# Patient Record
Sex: FEMALE | Race: OTHER | HISPANIC OR LATINO | URBAN - METROPOLITAN AREA
[De-identification: names, ages, dates, MRNs, and addresses within clinical notes are randomized per-mention and may not be internally consistent; named-entity substitution may affect disease eponyms.]

---

## 2017-06-23 ENCOUNTER — INPATIENT (INPATIENT)
Facility: HOSPITAL | Age: 67
LOS: 5 days | Discharge: HOME | DRG: 690 | End: 2017-06-29
Attending: FAMILY MEDICINE | Admitting: FAMILY MEDICINE
Payer: MEDICARE

## 2017-06-23 VITALS
DIASTOLIC BLOOD PRESSURE: 79 MMHG | TEMPERATURE: 99 F | RESPIRATION RATE: 22 BRPM | HEART RATE: 87 BPM | OXYGEN SATURATION: 99 % | SYSTOLIC BLOOD PRESSURE: 157 MMHG | WEIGHT: 154.1 LBS | HEIGHT: 58 IN

## 2017-06-23 DIAGNOSIS — E11.9 TYPE 2 DIABETES MELLITUS WITHOUT COMPLICATIONS: ICD-10-CM

## 2017-06-23 DIAGNOSIS — E87.6 HYPOKALEMIA: ICD-10-CM

## 2017-06-23 DIAGNOSIS — N13.30 UNSPECIFIED HYDRONEPHROSIS: ICD-10-CM

## 2017-06-23 DIAGNOSIS — N12 TUBULO-INTERSTITIAL NEPHRITIS, NOT SPECIFIED AS ACUTE OR CHRONIC: ICD-10-CM

## 2017-06-23 LAB
ALBUMIN SERPL ELPH-MCNC: 4.2 G/DL — SIGNIFICANT CHANGE UP (ref 3.3–5.2)
ALP SERPL-CCNC: 78 U/L — SIGNIFICANT CHANGE UP (ref 40–120)
ALT FLD-CCNC: 25 U/L — SIGNIFICANT CHANGE UP
ANION GAP SERPL CALC-SCNC: 17 MMOL/L — SIGNIFICANT CHANGE UP (ref 5–17)
APPEARANCE UR: CLEAR — SIGNIFICANT CHANGE UP
AST SERPL-CCNC: 23 U/L — SIGNIFICANT CHANGE UP
BILIRUB SERPL-MCNC: 0.4 MG/DL — SIGNIFICANT CHANGE UP (ref 0.4–2)
BILIRUB UR-MCNC: NEGATIVE — SIGNIFICANT CHANGE UP
BUN SERPL-MCNC: 30 MG/DL — HIGH (ref 8–20)
CALCIUM SERPL-MCNC: 9.8 MG/DL — SIGNIFICANT CHANGE UP (ref 8.6–10.2)
CHLORIDE SERPL-SCNC: 91 MMOL/L — LOW (ref 98–107)
CO2 SERPL-SCNC: 28 MMOL/L — SIGNIFICANT CHANGE UP (ref 22–29)
COLOR SPEC: YELLOW — SIGNIFICANT CHANGE UP
CREAT SERPL-MCNC: 1.12 MG/DL — SIGNIFICANT CHANGE UP (ref 0.5–1.3)
DIFF PNL FLD: NEGATIVE — SIGNIFICANT CHANGE UP
EPI CELLS # UR: SIGNIFICANT CHANGE UP
GLUCOSE SERPL-MCNC: 206 MG/DL — HIGH (ref 70–115)
GLUCOSE UR QL: 100 MG/DL
HCT VFR BLD CALC: 37 % — SIGNIFICANT CHANGE UP (ref 37–47)
HGB BLD-MCNC: 12.7 G/DL — SIGNIFICANT CHANGE UP (ref 12–16)
KETONES UR-MCNC: NEGATIVE — SIGNIFICANT CHANGE UP
LEUKOCYTE ESTERASE UR-ACNC: ABNORMAL
MCHC RBC-ENTMCNC: 28.9 PG — SIGNIFICANT CHANGE UP (ref 27–31)
MCHC RBC-ENTMCNC: 34.3 G/DL — SIGNIFICANT CHANGE UP (ref 32–36)
MCV RBC AUTO: 84.1 FL — SIGNIFICANT CHANGE UP (ref 81–99)
NITRITE UR-MCNC: NEGATIVE — SIGNIFICANT CHANGE UP
PH UR: 8 — SIGNIFICANT CHANGE UP (ref 5–8)
PLATELET # BLD AUTO: 344 K/UL — SIGNIFICANT CHANGE UP (ref 150–400)
POTASSIUM SERPL-MCNC: 2.9 MMOL/L — CRITICAL LOW (ref 3.5–5.3)
POTASSIUM SERPL-SCNC: 2.9 MMOL/L — CRITICAL LOW (ref 3.5–5.3)
PROT SERPL-MCNC: 7.2 G/DL — SIGNIFICANT CHANGE UP (ref 6.6–8.7)
PROT UR-MCNC: 15 MG/DL
RBC # BLD: 4.4 M/UL — SIGNIFICANT CHANGE UP (ref 4.4–5.2)
RBC # FLD: 13.8 % — SIGNIFICANT CHANGE UP (ref 11–15.6)
RBC CASTS # UR COMP ASSIST: NEGATIVE /HPF — SIGNIFICANT CHANGE UP (ref 0–4)
SODIUM SERPL-SCNC: 136 MMOL/L — SIGNIFICANT CHANGE UP (ref 135–145)
SP GR SPEC: 1.01 — SIGNIFICANT CHANGE UP (ref 1.01–1.02)
UROBILINOGEN FLD QL: NEGATIVE MG/DL — SIGNIFICANT CHANGE UP
WBC # BLD: 14.7 K/UL — HIGH (ref 4.8–10.8)
WBC # FLD AUTO: 14.7 K/UL — HIGH (ref 4.8–10.8)
WBC UR QL: SIGNIFICANT CHANGE UP

## 2017-06-23 PROCEDURE — 74176 CT ABD & PELVIS W/O CONTRAST: CPT | Mod: 26

## 2017-06-23 PROCEDURE — 99285 EMERGENCY DEPT VISIT HI MDM: CPT

## 2017-06-23 RX ORDER — ONDANSETRON 8 MG/1
4 TABLET, FILM COATED ORAL ONCE
Qty: 0 | Refills: 0 | Status: COMPLETED | OUTPATIENT
Start: 2017-06-23 | End: 2017-06-23

## 2017-06-23 RX ORDER — MORPHINE SULFATE 50 MG/1
4 CAPSULE, EXTENDED RELEASE ORAL ONCE
Qty: 0 | Refills: 0 | Status: DISCONTINUED | OUTPATIENT
Start: 2017-06-23 | End: 2017-06-23

## 2017-06-23 RX ORDER — MAGNESIUM SULFATE 500 MG/ML
2 VIAL (ML) INJECTION ONCE
Qty: 0 | Refills: 0 | Status: COMPLETED | OUTPATIENT
Start: 2017-06-23 | End: 2017-06-23

## 2017-06-23 RX ORDER — KETOROLAC TROMETHAMINE 30 MG/ML
30 SYRINGE (ML) INJECTION ONCE
Qty: 0 | Refills: 0 | Status: DISCONTINUED | OUTPATIENT
Start: 2017-06-23 | End: 2017-06-23

## 2017-06-23 RX ORDER — POTASSIUM CHLORIDE 20 MEQ
40 PACKET (EA) ORAL ONCE
Qty: 0 | Refills: 0 | Status: COMPLETED | OUTPATIENT
Start: 2017-06-23 | End: 2017-06-23

## 2017-06-23 RX ORDER — SODIUM CHLORIDE 9 MG/ML
1000 INJECTION INTRAMUSCULAR; INTRAVENOUS; SUBCUTANEOUS ONCE
Qty: 0 | Refills: 0 | Status: COMPLETED | OUTPATIENT
Start: 2017-06-23 | End: 2017-06-23

## 2017-06-23 RX ADMIN — ONDANSETRON 4 MILLIGRAM(S): 8 TABLET, FILM COATED ORAL at 22:22

## 2017-06-23 RX ADMIN — Medication 30 MILLIGRAM(S): at 23:05

## 2017-06-23 RX ADMIN — SODIUM CHLORIDE 1000 MILLILITER(S): 9 INJECTION INTRAMUSCULAR; INTRAVENOUS; SUBCUTANEOUS at 22:20

## 2017-06-23 RX ADMIN — MORPHINE SULFATE 4 MILLIGRAM(S): 50 CAPSULE, EXTENDED RELEASE ORAL at 23:05

## 2017-06-23 RX ADMIN — Medication 30 MILLIGRAM(S): at 22:22

## 2017-06-23 RX ADMIN — Medication 40 MILLIEQUIVALENT(S): at 23:50

## 2017-06-23 RX ADMIN — MORPHINE SULFATE 4 MILLIGRAM(S): 50 CAPSULE, EXTENDED RELEASE ORAL at 22:22

## 2017-06-23 RX ADMIN — Medication 50 GRAM(S): at 23:50

## 2017-06-23 NOTE — ED PROVIDER NOTE - PROGRESS NOTE DETAILS
pt had 102 fever with persistent vomiting and pt not able to tolerate po. will admit to telemetry given hypokalemia. pK and mag repleted and pt treated for pyelonephritis. pt with persistent vomiting and pt appears .. will admit for hypokalemia with prolonged QTC and vomiting

## 2017-06-23 NOTE — ED ADULT NURSE REASSESSMENT NOTE - NS ED NURSE REASSESS COMMENT FT1
Assumed pt care @ 6154. Pt is A&Ox3 in NAD. Pt complains of RLQ pain at this time, will make Dr. Zayas aware.  IV clean dry and intact, running NS Bolus without difficulty.  Safety maintained, Bed locked in lowest position. Pt awaiting pending labs. Will continue to monitor.

## 2017-06-23 NOTE — ED PROVIDER NOTE - OBJECTIVE STATEMENT
65 y/o F with a PMHx of HTN presents to ED c/o back pain that radiates to the RUQ, pain onset today at 1500. Pt states she also has nausea  Pt denies hematuria, dysuria, vomiting, cardiac problems or any further complaints.

## 2017-06-23 NOTE — ED PROVIDER NOTE - CARE PLAN
Principal Discharge DX:	Intractable vomiting, presence of nausea not specified, unspecified vomiting type  Secondary Diagnosis:	Hypokalemia  Secondary Diagnosis:	Other hydronephrosis

## 2017-06-23 NOTE — ED ADULT NURSE NOTE - CHIEF COMPLAINT QUOTE
pt c/o abd pain to right lower quadrant, emesis reported by granddaughter x4, denies any diarrhea  c/o burning sensation while urinating

## 2017-06-23 NOTE — ED PROVIDER NOTE - MEDICAL DECISION MAKING DETAILS
67 y/o f w RLQ and R flank pain. Will obtain CT A/P with no contrast, labs, pain control, IV fluids, antiemetics and re-eval.

## 2017-06-23 NOTE — ED ADULT NURSE NOTE - OBJECTIVE STATEMENT
pt c/o right lower quadrant pain with burning urination denies diarrhea or nausea c/o emesis started today at 3 pm

## 2017-06-23 NOTE — ED ADULT TRIAGE NOTE - CHIEF COMPLAINT QUOTE
pt c/o abd pain to right lower quadrant, emesis reported by granddaughter x4, denies any diarrhea pt c/o abd pain to right lower quadrant, emesis reported by granddaughter x4, denies any diarrhea  c/o burning sensation while urinating

## 2017-06-24 DIAGNOSIS — I10 ESSENTIAL (PRIMARY) HYPERTENSION: ICD-10-CM

## 2017-06-24 DIAGNOSIS — E11.9 TYPE 2 DIABETES MELLITUS WITHOUT COMPLICATIONS: ICD-10-CM

## 2017-06-24 DIAGNOSIS — Z86.011 PERSONAL HISTORY OF BENIGN NEOPLASM OF THE BRAIN: Chronic | ICD-10-CM

## 2017-06-24 DIAGNOSIS — E87.6 HYPOKALEMIA: ICD-10-CM

## 2017-06-24 DIAGNOSIS — R10.9 UNSPECIFIED ABDOMINAL PAIN: ICD-10-CM

## 2017-06-24 DIAGNOSIS — E11.65 TYPE 2 DIABETES MELLITUS WITH HYPERGLYCEMIA: ICD-10-CM

## 2017-06-24 DIAGNOSIS — N13.39 OTHER HYDRONEPHROSIS: ICD-10-CM

## 2017-06-24 DIAGNOSIS — R11.10 VOMITING, UNSPECIFIED: ICD-10-CM

## 2017-06-24 LAB
ANION GAP SERPL CALC-SCNC: 17 MMOL/L — SIGNIFICANT CHANGE UP (ref 5–17)
ANION GAP SERPL CALC-SCNC: 21 MMOL/L — HIGH (ref 5–17)
ANISOCYTOSIS BLD QL: SLIGHT — SIGNIFICANT CHANGE UP
APTT BLD: 29.8 SEC — SIGNIFICANT CHANGE UP (ref 27.5–37.4)
BUN SERPL-MCNC: 28 MG/DL — HIGH (ref 8–20)
BUN SERPL-MCNC: 29 MG/DL — HIGH (ref 8–20)
BURR CELLS BLD QL SMEAR: PRESENT — SIGNIFICANT CHANGE UP
CALCIUM SERPL-MCNC: 8.2 MG/DL — LOW (ref 8.6–10.2)
CALCIUM SERPL-MCNC: 8.8 MG/DL — SIGNIFICANT CHANGE UP (ref 8.6–10.2)
CHLORIDE SERPL-SCNC: 93 MMOL/L — LOW (ref 98–107)
CHLORIDE SERPL-SCNC: 96 MMOL/L — LOW (ref 98–107)
CO2 SERPL-SCNC: 21 MMOL/L — LOW (ref 22–29)
CO2 SERPL-SCNC: 21 MMOL/L — LOW (ref 22–29)
CREAT SERPL-MCNC: 1.23 MG/DL — SIGNIFICANT CHANGE UP (ref 0.5–1.3)
CREAT SERPL-MCNC: 1.34 MG/DL — HIGH (ref 0.5–1.3)
GLUCOSE SERPL-MCNC: 290 MG/DL — HIGH (ref 70–115)
GLUCOSE SERPL-MCNC: 316 MG/DL — HIGH (ref 70–115)
HCT VFR BLD CALC: 36.8 % — LOW (ref 37–47)
HGB BLD-MCNC: 12.6 G/DL — SIGNIFICANT CHANGE UP (ref 12–16)
INR BLD: 1.16 RATIO — SIGNIFICANT CHANGE UP (ref 0.88–1.16)
LYMPHOCYTES # BLD AUTO: 4 % — LOW (ref 20–55)
MACROCYTES BLD QL: SLIGHT — SIGNIFICANT CHANGE UP
MCHC RBC-ENTMCNC: 28.6 PG — SIGNIFICANT CHANGE UP (ref 27–31)
MCHC RBC-ENTMCNC: 34.2 G/DL — SIGNIFICANT CHANGE UP (ref 32–36)
MCV RBC AUTO: 83.6 FL — SIGNIFICANT CHANGE UP (ref 81–99)
MICROCYTES BLD QL: SLIGHT — SIGNIFICANT CHANGE UP
MONOCYTES NFR BLD AUTO: 3 % — SIGNIFICANT CHANGE UP (ref 3–10)
NEUTROPHILS NFR BLD AUTO: 85 % — HIGH (ref 37–73)
NEUTS BAND # BLD: 8 % — SIGNIFICANT CHANGE UP (ref 0–8)
OVALOCYTES BLD QL SMEAR: SLIGHT — SIGNIFICANT CHANGE UP
PLAT MORPH BLD: NORMAL — SIGNIFICANT CHANGE UP
PLATELET # BLD AUTO: 272 K/UL — SIGNIFICANT CHANGE UP (ref 150–400)
POTASSIUM SERPL-MCNC: 3.6 MMOL/L — SIGNIFICANT CHANGE UP (ref 3.5–5.3)
POTASSIUM SERPL-MCNC: 4.3 MMOL/L — SIGNIFICANT CHANGE UP (ref 3.5–5.3)
POTASSIUM SERPL-SCNC: 3.6 MMOL/L — SIGNIFICANT CHANGE UP (ref 3.5–5.3)
POTASSIUM SERPL-SCNC: 4.3 MMOL/L — SIGNIFICANT CHANGE UP (ref 3.5–5.3)
PROTHROM AB SERPL-ACNC: 12.8 SEC — HIGH (ref 9.8–12.7)
RBC # BLD: 4.4 M/UL — SIGNIFICANT CHANGE UP (ref 4.4–5.2)
RBC # FLD: 13.9 % — SIGNIFICANT CHANGE UP (ref 11–15.6)
RBC BLD AUTO: ABNORMAL
SODIUM SERPL-SCNC: 134 MMOL/L — LOW (ref 135–145)
SODIUM SERPL-SCNC: 135 MMOL/L — SIGNIFICANT CHANGE UP (ref 135–145)
WBC # BLD: 22.2 K/UL — HIGH (ref 4.8–10.8)
WBC # FLD AUTO: 22.2 K/UL — HIGH (ref 4.8–10.8)

## 2017-06-24 PROCEDURE — 99223 1ST HOSP IP/OBS HIGH 75: CPT

## 2017-06-24 PROCEDURE — 71010: CPT | Mod: 26

## 2017-06-24 PROCEDURE — 93010 ELECTROCARDIOGRAM REPORT: CPT

## 2017-06-24 RX ORDER — DEXTROSE 50 % IN WATER 50 %
25 SYRINGE (ML) INTRAVENOUS ONCE
Qty: 0 | Refills: 0 | Status: DISCONTINUED | OUTPATIENT
Start: 2017-06-24 | End: 2017-06-29

## 2017-06-24 RX ORDER — NORTRIPTYLINE HYDROCHLORIDE 10 MG/1
25 CAPSULE ORAL DAILY
Qty: 0 | Refills: 0 | Status: DISCONTINUED | OUTPATIENT
Start: 2017-06-24 | End: 2017-06-28

## 2017-06-24 RX ORDER — SODIUM CHLORIDE 9 MG/ML
500 INJECTION INTRAMUSCULAR; INTRAVENOUS; SUBCUTANEOUS ONCE
Qty: 0 | Refills: 0 | Status: COMPLETED | OUTPATIENT
Start: 2017-06-24 | End: 2017-06-24

## 2017-06-24 RX ORDER — ONDANSETRON 8 MG/1
4 TABLET, FILM COATED ORAL EVERY 6 HOURS
Qty: 0 | Refills: 0 | Status: DISCONTINUED | OUTPATIENT
Start: 2017-06-24 | End: 2017-06-29

## 2017-06-24 RX ORDER — AMLODIPINE BESYLATE 2.5 MG/1
5 TABLET ORAL DAILY
Qty: 0 | Refills: 0 | Status: DISCONTINUED | OUTPATIENT
Start: 2017-06-24 | End: 2017-06-29

## 2017-06-24 RX ORDER — ACETAMINOPHEN 500 MG
650 TABLET ORAL ONCE
Qty: 0 | Refills: 0 | Status: COMPLETED | OUTPATIENT
Start: 2017-06-24 | End: 2017-06-24

## 2017-06-24 RX ORDER — MORPHINE SULFATE 50 MG/1
2 CAPSULE, EXTENDED RELEASE ORAL EVERY 4 HOURS
Qty: 0 | Refills: 0 | Status: DISCONTINUED | OUTPATIENT
Start: 2017-06-24 | End: 2017-06-29

## 2017-06-24 RX ORDER — CEFTRIAXONE 500 MG/1
INJECTION, POWDER, FOR SOLUTION INTRAMUSCULAR; INTRAVENOUS
Qty: 0 | Refills: 0 | Status: DISCONTINUED | OUTPATIENT
Start: 2017-06-24 | End: 2017-06-25

## 2017-06-24 RX ORDER — SODIUM CHLORIDE 9 MG/ML
1000 INJECTION INTRAMUSCULAR; INTRAVENOUS; SUBCUTANEOUS
Qty: 0 | Refills: 0 | Status: DISCONTINUED | OUTPATIENT
Start: 2017-06-24 | End: 2017-06-28

## 2017-06-24 RX ORDER — CEFTRIAXONE 500 MG/1
1 INJECTION, POWDER, FOR SOLUTION INTRAMUSCULAR; INTRAVENOUS ONCE
Qty: 0 | Refills: 0 | Status: COMPLETED | OUTPATIENT
Start: 2017-06-24 | End: 2017-06-24

## 2017-06-24 RX ORDER — INSULIN LISPRO 100/ML
VIAL (ML) SUBCUTANEOUS
Qty: 0 | Refills: 0 | Status: DISCONTINUED | OUTPATIENT
Start: 2017-06-24 | End: 2017-06-27

## 2017-06-24 RX ORDER — CEFTRIAXONE 500 MG/1
1 INJECTION, POWDER, FOR SOLUTION INTRAMUSCULAR; INTRAVENOUS EVERY 24 HOURS
Qty: 0 | Refills: 0 | Status: DISCONTINUED | OUTPATIENT
Start: 2017-06-25 | End: 2017-06-25

## 2017-06-24 RX ORDER — KETOROLAC TROMETHAMINE 30 MG/ML
30 SYRINGE (ML) INJECTION ONCE
Qty: 0 | Refills: 0 | Status: DISCONTINUED | OUTPATIENT
Start: 2017-06-24 | End: 2017-06-24

## 2017-06-24 RX ORDER — FAMOTIDINE 10 MG/ML
20 INJECTION INTRAVENOUS
Qty: 0 | Refills: 0 | Status: DISCONTINUED | OUTPATIENT
Start: 2017-06-24 | End: 2017-06-29

## 2017-06-24 RX ORDER — DEXTROSE 50 % IN WATER 50 %
1 SYRINGE (ML) INTRAVENOUS ONCE
Qty: 0 | Refills: 0 | Status: DISCONTINUED | OUTPATIENT
Start: 2017-06-24 | End: 2017-06-29

## 2017-06-24 RX ORDER — SODIUM CHLORIDE 9 MG/ML
1000 INJECTION, SOLUTION INTRAVENOUS
Qty: 0 | Refills: 0 | Status: DISCONTINUED | OUTPATIENT
Start: 2017-06-24 | End: 2017-06-29

## 2017-06-24 RX ORDER — GLUCAGON INJECTION, SOLUTION 0.5 MG/.1ML
1 INJECTION, SOLUTION SUBCUTANEOUS ONCE
Qty: 0 | Refills: 0 | Status: DISCONTINUED | OUTPATIENT
Start: 2017-06-24 | End: 2017-06-29

## 2017-06-24 RX ORDER — MORPHINE SULFATE 50 MG/1
2 CAPSULE, EXTENDED RELEASE ORAL EVERY 6 HOURS
Qty: 0 | Refills: 0 | Status: DISCONTINUED | OUTPATIENT
Start: 2017-06-24 | End: 2017-06-24

## 2017-06-24 RX ORDER — ENOXAPARIN SODIUM 100 MG/ML
40 INJECTION SUBCUTANEOUS DAILY
Qty: 0 | Refills: 0 | Status: DISCONTINUED | OUTPATIENT
Start: 2017-06-24 | End: 2017-06-29

## 2017-06-24 RX ORDER — DEXTROSE 50 % IN WATER 50 %
12.5 SYRINGE (ML) INTRAVENOUS ONCE
Qty: 0 | Refills: 0 | Status: DISCONTINUED | OUTPATIENT
Start: 2017-06-24 | End: 2017-06-29

## 2017-06-24 RX ORDER — SODIUM CHLORIDE 9 MG/ML
1000 INJECTION INTRAMUSCULAR; INTRAVENOUS; SUBCUTANEOUS
Qty: 0 | Refills: 0 | Status: DISCONTINUED | OUTPATIENT
Start: 2017-06-24 | End: 2017-06-24

## 2017-06-24 RX ORDER — MAGNESIUM SULFATE 500 MG/ML
2 VIAL (ML) INJECTION ONCE
Qty: 0 | Refills: 0 | Status: COMPLETED | OUTPATIENT
Start: 2017-06-24 | End: 2017-06-24

## 2017-06-24 RX ORDER — POTASSIUM CHLORIDE 20 MEQ
40 PACKET (EA) ORAL ONCE
Qty: 0 | Refills: 0 | Status: COMPLETED | OUTPATIENT
Start: 2017-06-24 | End: 2017-06-24

## 2017-06-24 RX ORDER — FOLIC ACID 0.8 MG
1 TABLET ORAL DAILY
Qty: 0 | Refills: 0 | Status: DISCONTINUED | OUTPATIENT
Start: 2017-06-24 | End: 2017-06-29

## 2017-06-24 RX ADMIN — ONDANSETRON 4 MILLIGRAM(S): 8 TABLET, FILM COATED ORAL at 20:08

## 2017-06-24 RX ADMIN — Medication 1 MILLIGRAM(S): at 07:10

## 2017-06-24 RX ADMIN — SODIUM CHLORIDE 500 MILLILITER(S): 9 INJECTION INTRAMUSCULAR; INTRAVENOUS; SUBCUTANEOUS at 06:31

## 2017-06-24 RX ADMIN — MORPHINE SULFATE 2 MILLIGRAM(S): 50 CAPSULE, EXTENDED RELEASE ORAL at 14:00

## 2017-06-24 RX ADMIN — MORPHINE SULFATE 2 MILLIGRAM(S): 50 CAPSULE, EXTENDED RELEASE ORAL at 10:19

## 2017-06-24 RX ADMIN — AMLODIPINE BESYLATE 5 MILLIGRAM(S): 2.5 TABLET ORAL at 13:26

## 2017-06-24 RX ADMIN — ENOXAPARIN SODIUM 40 MILLIGRAM(S): 100 INJECTION SUBCUTANEOUS at 13:28

## 2017-06-24 RX ADMIN — CEFTRIAXONE 100 GRAM(S): 500 INJECTION, POWDER, FOR SOLUTION INTRAMUSCULAR; INTRAVENOUS at 22:26

## 2017-06-24 RX ADMIN — SODIUM CHLORIDE 150 MILLILITER(S): 9 INJECTION INTRAMUSCULAR; INTRAVENOUS; SUBCUTANEOUS at 08:07

## 2017-06-24 RX ADMIN — FAMOTIDINE 20 MILLIGRAM(S): 10 INJECTION INTRAVENOUS at 07:10

## 2017-06-24 RX ADMIN — Medication 30 MILLIGRAM(S): at 04:42

## 2017-06-24 RX ADMIN — FAMOTIDINE 20 MILLIGRAM(S): 10 INJECTION INTRAVENOUS at 17:43

## 2017-06-24 RX ADMIN — Medication: at 17:43

## 2017-06-24 RX ADMIN — NORTRIPTYLINE HYDROCHLORIDE 25 MILLIGRAM(S): 10 CAPSULE ORAL at 07:10

## 2017-06-24 RX ADMIN — Medication 50 GRAM(S): at 03:44

## 2017-06-24 RX ADMIN — SODIUM CHLORIDE 150 MILLILITER(S): 9 INJECTION INTRAMUSCULAR; INTRAVENOUS; SUBCUTANEOUS at 18:06

## 2017-06-24 RX ADMIN — Medication 40 MILLIEQUIVALENT(S): at 03:44

## 2017-06-24 RX ADMIN — MORPHINE SULFATE 2 MILLIGRAM(S): 50 CAPSULE, EXTENDED RELEASE ORAL at 13:45

## 2017-06-24 RX ADMIN — MORPHINE SULFATE 2 MILLIGRAM(S): 50 CAPSULE, EXTENDED RELEASE ORAL at 09:49

## 2017-06-24 RX ADMIN — SODIUM CHLORIDE 1000 MILLILITER(S): 9 INJECTION INTRAMUSCULAR; INTRAVENOUS; SUBCUTANEOUS at 08:07

## 2017-06-24 RX ADMIN — Medication 30 MILLIGRAM(S): at 05:59

## 2017-06-24 RX ADMIN — Medication 650 MILLIGRAM(S): at 05:59

## 2017-06-24 NOTE — PROGRESS NOTE ADULT - ASSESSMENT
stable  dehydration, positive urine  infection   possible  passed  a stone , acute episode   electrolyte  corrected  pre renal azotemia

## 2017-06-24 NOTE — PROGRESS NOTE ADULT - SUBJECTIVE AND OBJECTIVE BOX
HPI:  65 y/o female came to ER for one day duration of rt. flank pain, sharp, 8 out of 10 on pain scale.+ n/ v. + chills. no diarrhea. denies urine sympt. no cough. no cp. pt. denies any h/o renal stones. pt. got pain meds and iv fluids and feels better. no other complaints at this point. (2017 06:38)    Female  PAST MEDICAL & SURGICAL HISTORY:  HTN (hypertension)  DM (diabetes mellitus)  History of benign brain tumor      REVIEW OF SYSTEMS      General:	obesity    Skin/Breast:  normal  	  Ophthalmologic:  diabetic retinopathy   	  ENMT:	  normal     Respiratory and Thorax: normal   	  cardiovascular  hypertension     Gastrointestinal:	  gastritis    Genitourinary:	negative     Musculoskeletal: obesity 	    Neurological:	s/p brain biopsy     Psychiatric:	negative     Hematology/Lymphatics: normal 	    Endocrine:	obesity, diabetes     Allergic/Immunologic:	    MEDICATIONS  (STANDING):  nortriptyline 25milliGRAM(s) Oral daily  famotidine    Tablet 20milliGRAM(s) Oral two times a day  folic acid 1milliGRAM(s) Oral daily  LORazepam   Injectable 1milliGRAM(s) IV Push once  levoFLOXacin IVPB 500milliGRAM(s) IV Intermittent every 24 hours  enoxaparin Injectable 40milliGRAM(s) SubCutaneous daily  sodium chloride 0.9%. 1000milliLiter(s) IV Continuous <Continuous>  amLODIPine   Tablet 5milliGRAM(s) Oral daily  insulin lispro (HumaLOG) corrective regimen sliding scale  SubCutaneous three times a day before meals  dextrose 5%. 1000milliLiter(s) IV Continuous <Continuous>  dextrose 50% Injectable 12.5Gram(s) IV Push once  dextrose 50% Injectable 25Gram(s) IV Push once  dextrose 50% Injectable 25Gram(s) IV Push once    MEDICATIONS  (PRN):  morphine  - Injectable 2milliGRAM(s) IV Push every 6 hours PRN moderate to Severe Pain (7 - 10)  ondansetron Injectable 4milliGRAM(s) IV Push every 6 hours PRN Nausea and/or Vomiting  dextrose Gel 1Dose(s) Oral once PRN Blood Glucose LESS THAN 70 milliGRAM(s)/deciliter  glucagon  Injectable 1milliGRAM(s) IntraMuscular once PRN Glucose LESS THAN 70 milligrams/deciliter      Allergies    No Known Allergies    Intolerances        SOCIAL HISTORY:    FAMILY HISTORY:  Family history of diabetes mellitus      Vital Signs Last 24 Hrs  T(C): 36.7, Max: 37.1 ( @ 20:36)  T(F): 98, Max: 98.8 ( @ 20:36)  HR: 88 (87 - 90)  BP: 137/84 (137/84 - 157/79)  BP(mean): --  RR: 20 (20 - 22)  SpO2: 98% (98% - 99%)    PHYSICAL EXAM:      Constitutional:  normal     Eyes:  refraction disorder     ENMT:  normal     Neck:  supple     Breasts:  normal     Back:  normal     Respiratory:  normal     Cardiovascular: hypertension     Gastrointestinal: abdomen soft, RLQ tenderness, R CVA, angle tenderness, R flank tenderness    Genitourinary:     Rectal:    Extremities:  normal     Vascular: normal pulses     Neurological: stable     Skin:  normal     Lymph Nodes:  negative     Musculoskeletal:  obesity, diabetes hypercholesterolemia     Psychiatric:  negative normal         LABS:  CBC Full  -  ( 2017 21:41 )  WBC Count : 14.7 K/uL  Hemoglobin : 12.7 g/dL  Hematocrit : 37.0 %  Platelet Count - Automated : 344 K/uL  Mean Cell Volume : 84.1 fl  Mean Cell Hemoglobin : 28.9 pg  Mean Cell Hemoglobin Concentration : 34.3 g/dL  Auto Neutrophil # : x  Auto Lymphocyte # : x  Auto Monocyte # : x  Auto Eosinophil # : x  Auto Basophil # : x  Auto Neutrophil % : x  Auto Lymphocyte % : x  Auto Monocyte % : x  Auto Eosinophil % : x  Auto Basophil % : x    -    135  |  93<L>  |  28.0<H>  ----------------------------<  290<H>  3.6   |  21.0<L>  |  1.23    Ca    8.8      2017 06:12    TPro  7.2  /  Alb  4.2  /  TBili  0.4  /  DBili  x   /  AST  23  /  ALT  25  /  AlkPhos  78  06-      Urinalysis Basic - ( 2017 22:21 )    Color: Yellow / Appearance: Clear / S.010 / pH: x  Gluc: x / Ketone: Negative  / Bili: Negative / Urobili: Negative mg/dL   Blood: x / Protein: 15 mg/dL / Nitrite: Negative   Leuk Esterase: Moderate / RBC: Negative /HPF / WBC 3-5   Sq Epi: x / Non Sq Epi: Few / Bacteria: x        RADIOLOGY & ADDITIONAL STUDIES:  hydronephrosis, no stones visualized,  renal straining

## 2017-06-24 NOTE — ED ADULT NURSE REASSESSMENT NOTE - NS ED NURSE REASSESS COMMENT FT1
Pt received awake, alert, and oriented x4 with even and unlabored respirations. Denies CP/SOB/N/V/D/C. Denies pain. NS infusing as per orders and NSR on CM. Denies HA or dizziness. Afebrile and non diaphoretic. No distress present. Awaiting admission orders for transport to bed assignment. RN to continue to monitor and reassess closely. Safety maintained at all times. No complaints offered at current moment in time.

## 2017-06-24 NOTE — ED ADULT NURSE REASSESSMENT NOTE - NS ED NURSE REASSESS COMMENT FT1
Call placed to Brittani HENRIQUEZ and made aware of patient to come to admitted bed assignment. Awaiting transport. Pt and family updated on plan of care. RN to continue to monitor and reassess.

## 2017-06-24 NOTE — ED ADULT NURSE REASSESSMENT NOTE - NS ED NURSE REASSESS COMMENT FT1
Pt is sleeping comfortably in NAD.  IV clean dry and intact, flushes without difficulty. Safety maintained, Bed locked in lowest position.  Will continue to monitor.

## 2017-06-24 NOTE — H&P ADULT - HISTORY OF PRESENT ILLNESS
67 y/o female came to ER for one day duration of rt. flank pain, sharp, 8 out of 10 on pain scale.+ n/ v. + chills. no diarrhea. denies urine sympt. no cough. no cp. pt. denies any h/o renal stones. pt. got pain meds and iv fluids and feels better. no other complaints at this point.

## 2017-06-24 NOTE — H&P ADULT - GASTROINTESTINAL DETAILS
no rebound tenderness/bowel sounds normal/mild pain in rt. lower quadrant area to rt. flank area./no distention/soft

## 2017-06-25 DIAGNOSIS — N39.0 URINARY TRACT INFECTION, SITE NOT SPECIFIED: ICD-10-CM

## 2017-06-25 DIAGNOSIS — N12 TUBULO-INTERSTITIAL NEPHRITIS, NOT SPECIFIED AS ACUTE OR CHRONIC: ICD-10-CM

## 2017-06-25 LAB
ALBUMIN SERPL ELPH-MCNC: 3.3 G/DL — SIGNIFICANT CHANGE UP (ref 3.3–5.2)
ALP SERPL-CCNC: 67 U/L — SIGNIFICANT CHANGE UP (ref 40–120)
ALT FLD-CCNC: 17 U/L — SIGNIFICANT CHANGE UP
ANION GAP SERPL CALC-SCNC: 16 MMOL/L — SIGNIFICANT CHANGE UP (ref 5–17)
AST SERPL-CCNC: 16 U/L — SIGNIFICANT CHANGE UP
BILIRUB SERPL-MCNC: 0.6 MG/DL — SIGNIFICANT CHANGE UP (ref 0.4–2)
BUN SERPL-MCNC: 22 MG/DL — HIGH (ref 8–20)
CALCIUM SERPL-MCNC: 7.9 MG/DL — LOW (ref 8.6–10.2)
CHLORIDE SERPL-SCNC: 100 MMOL/L — SIGNIFICANT CHANGE UP (ref 98–107)
CO2 SERPL-SCNC: 21 MMOL/L — LOW (ref 22–29)
CREAT SERPL-MCNC: 1.18 MG/DL — SIGNIFICANT CHANGE UP (ref 0.5–1.3)
GLUCOSE SERPL-MCNC: 312 MG/DL — HIGH (ref 70–115)
HBA1C BLD-MCNC: 10.2 % — HIGH (ref 4–5.6)
HCT VFR BLD CALC: 32.3 % — LOW (ref 37–47)
HGB BLD-MCNC: 11 G/DL — LOW (ref 12–16)
MCHC RBC-ENTMCNC: 28.9 PG — SIGNIFICANT CHANGE UP (ref 27–31)
MCHC RBC-ENTMCNC: 34.1 G/DL — SIGNIFICANT CHANGE UP (ref 32–36)
MCV RBC AUTO: 85 FL — SIGNIFICANT CHANGE UP (ref 81–99)
PLATELET # BLD AUTO: 216 K/UL — SIGNIFICANT CHANGE UP (ref 150–400)
POTASSIUM SERPL-MCNC: 3.7 MMOL/L — SIGNIFICANT CHANGE UP (ref 3.5–5.3)
POTASSIUM SERPL-SCNC: 3.7 MMOL/L — SIGNIFICANT CHANGE UP (ref 3.5–5.3)
PROT SERPL-MCNC: 6.2 G/DL — LOW (ref 6.6–8.7)
RBC # BLD: 3.8 M/UL — LOW (ref 4.4–5.2)
RBC # FLD: 14.6 % — SIGNIFICANT CHANGE UP (ref 11–15.6)
SODIUM SERPL-SCNC: 137 MMOL/L — SIGNIFICANT CHANGE UP (ref 135–145)
WBC # BLD: 23.2 K/UL — HIGH (ref 4.8–10.8)
WBC # FLD AUTO: 23.2 K/UL — HIGH (ref 4.8–10.8)

## 2017-06-25 PROCEDURE — 99223 1ST HOSP IP/OBS HIGH 75: CPT

## 2017-06-25 RX ORDER — ERTAPENEM SODIUM 1 G/1
1000 INJECTION, POWDER, LYOPHILIZED, FOR SOLUTION INTRAMUSCULAR; INTRAVENOUS EVERY 24 HOURS
Qty: 0 | Refills: 0 | Status: DISCONTINUED | OUTPATIENT
Start: 2017-06-26 | End: 2017-06-27

## 2017-06-25 RX ORDER — ERTAPENEM SODIUM 1 G/1
INJECTION, POWDER, LYOPHILIZED, FOR SOLUTION INTRAMUSCULAR; INTRAVENOUS
Qty: 0 | Refills: 0 | Status: DISCONTINUED | OUTPATIENT
Start: 2017-06-25 | End: 2017-06-27

## 2017-06-25 RX ORDER — ERTAPENEM SODIUM 1 G/1
1000 INJECTION, POWDER, LYOPHILIZED, FOR SOLUTION INTRAMUSCULAR; INTRAVENOUS ONCE
Qty: 0 | Refills: 0 | Status: COMPLETED | OUTPATIENT
Start: 2017-06-25 | End: 2017-06-25

## 2017-06-25 RX ADMIN — MORPHINE SULFATE 2 MILLIGRAM(S): 50 CAPSULE, EXTENDED RELEASE ORAL at 11:33

## 2017-06-25 RX ADMIN — NORTRIPTYLINE HYDROCHLORIDE 25 MILLIGRAM(S): 10 CAPSULE ORAL at 11:37

## 2017-06-25 RX ADMIN — Medication 1 MILLIGRAM(S): at 11:37

## 2017-06-25 RX ADMIN — MORPHINE SULFATE 2 MILLIGRAM(S): 50 CAPSULE, EXTENDED RELEASE ORAL at 04:27

## 2017-06-25 RX ADMIN — SODIUM CHLORIDE 150 MILLILITER(S): 9 INJECTION INTRAMUSCULAR; INTRAVENOUS; SUBCUTANEOUS at 17:45

## 2017-06-25 RX ADMIN — MORPHINE SULFATE 2 MILLIGRAM(S): 50 CAPSULE, EXTENDED RELEASE ORAL at 17:37

## 2017-06-25 RX ADMIN — AMLODIPINE BESYLATE 5 MILLIGRAM(S): 2.5 TABLET ORAL at 04:24

## 2017-06-25 RX ADMIN — FAMOTIDINE 20 MILLIGRAM(S): 10 INJECTION INTRAVENOUS at 17:13

## 2017-06-25 RX ADMIN — FAMOTIDINE 20 MILLIGRAM(S): 10 INJECTION INTRAVENOUS at 04:25

## 2017-06-25 RX ADMIN — SODIUM CHLORIDE 150 MILLILITER(S): 9 INJECTION INTRAMUSCULAR; INTRAVENOUS; SUBCUTANEOUS at 04:25

## 2017-06-25 RX ADMIN — ERTAPENEM SODIUM 120 MILLIGRAM(S): 1 INJECTION, POWDER, LYOPHILIZED, FOR SOLUTION INTRAMUSCULAR; INTRAVENOUS at 11:30

## 2017-06-25 RX ADMIN — Medication: at 17:13

## 2017-06-25 RX ADMIN — Medication 8: at 11:35

## 2017-06-25 RX ADMIN — MORPHINE SULFATE 2 MILLIGRAM(S): 50 CAPSULE, EXTENDED RELEASE ORAL at 12:03

## 2017-06-25 RX ADMIN — MORPHINE SULFATE 2 MILLIGRAM(S): 50 CAPSULE, EXTENDED RELEASE ORAL at 19:10

## 2017-06-25 RX ADMIN — ENOXAPARIN SODIUM 40 MILLIGRAM(S): 100 INJECTION SUBCUTANEOUS at 11:38

## 2017-06-25 RX ADMIN — MORPHINE SULFATE 2 MILLIGRAM(S): 50 CAPSULE, EXTENDED RELEASE ORAL at 05:09

## 2017-06-25 NOTE — CONSULT NOTE ADULT - SUBJECTIVE AND OBJECTIVE BOX
HPI:  65 y/o female came to ER for one day duration of rt. flank pain, sharp, 8 out of 10 on pain scale.+ n/ v. + chills. no diarrhea. denies urine sympt. no cough. no cp. pt. denies any h/o renal stones. pt. got pain meds and iv fluids and feels better. no other complaints at this point. (2017 06:38)  The patient reports she did not have fever at home.  She did have chills when she came to the ER.  She currently feels better.  She reports some dysuria as well as new onset frequency and nocturia.    PAST MEDICAL & SURGICAL HISTORY:  HTN (hypertension)  DM (diabetes mellitus)  History of benign brain tumor      REVIEW OF SYSTEMS:    Constitutional: No fever, weight loss or fatigue  Eyes: No eye pain, visual disturbances, or discharge  ENMT:  No difficulty hearing, tinnitus, vertigo; No sinus or throat pain  Respiratory: No cough, wheezing, chills or hemoptysis  Cardiovascular: No chest pain, palpitations, shortness of breath, dizziness or leg swelling  Gastrointestinal: No abdominal or epigastric pain. No nausea, vomiting or hematemesis; No diarrhea or constipation. No melena or hematochezia.  Genitourinary: No dysuria, frequency, hematuria or incontinence  Rectal: No pain, hemorrhoids or incontinence  Neurological: No headaches, memory loss, loss of strength, numbness or tremors  Skin: No itching, burning, rashes or lesions   Lymph Nodes: No enlarged glands  Musculoskeletal: No joint pain or swelling; No muscle, back or extremity pain  Psychiatric: No depression, anxiety, mood swings or difficulty sleeping  Heme/Lymph: No easy bruising or bleeding gums  Allergy and Immunologic: No hives or eczema    MEDICATIONS  (STANDING):  nortriptyline 25milliGRAM(s) Oral daily  famotidine    Tablet 20milliGRAM(s) Oral two times a day  folic acid 1milliGRAM(s) Oral daily  LORazepam   Injectable 1milliGRAM(s) IV Push once  enoxaparin Injectable 40milliGRAM(s) SubCutaneous daily  sodium chloride 0.9%. 1000milliLiter(s) IV Continuous <Continuous>  amLODIPine   Tablet 5milliGRAM(s) Oral daily  insulin lispro (HumaLOG) corrective regimen sliding scale  SubCutaneous three times a day before meals  dextrose 5%. 1000milliLiter(s) IV Continuous <Continuous>  dextrose 50% Injectable 12.5Gram(s) IV Push once  dextrose 50% Injectable 25Gram(s) IV Push once  dextrose 50% Injectable 25Gram(s) IV Push once  ertapenem  IVPB  IV Intermittent     MEDICATIONS  (PRN):  ondansetron Injectable 4milliGRAM(s) IV Push every 6 hours PRN Nausea and/or Vomiting  dextrose Gel 1Dose(s) Oral once PRN Blood Glucose LESS THAN 70 milliGRAM(s)/deciliter  glucagon  Injectable 1milliGRAM(s) IntraMuscular once PRN Glucose LESS THAN 70 milligrams/deciliter  morphine  - Injectable 2milliGRAM(s) IV Push every 4 hours PRN Moderate Pain (4 - 6)      Allergies    No Known Allergies    Intolerances        SOCIAL HISTORY:    FAMILY HISTORY:  Family history of diabetes mellitus      Vital Signs Last 24 Hrs  T(C): 36.9, Max: 37.5 ( @ 04:47)  T(F): 98.5, Max: 99.5 ( @ 04:47)  HR: 89 (89 - 96)  BP: 99/57 (90/56 - 120/60)  BP(mean): --  RR: 16 (16 - 20)  SpO2: 94% (94% - 94%)    PHYSICAL EXAM:    General: Well developed; well nourished; in no acute distress  Head: Normocephalic; atraumatic  Respiratory: No tachypnea  Gastrointestinal: Soft non-tender non-distended;   Moderate right CVAT  Genitourinary: No costovertebral angle tenderness.  Urinary bladder is clinically not distended  Extremities: Normal range of motion, No edema  Neurological: Alert and oriented x4  Skin: Warm and dry. No acute rash  Psychiatric: Cooperative and appropriate      LABS:                        11.0   23.2  )-----------( 216      ( 2017 12:15 )             32.3     -    137  |  100  |  22.0<H>  ----------------------------<  312<H>  3.7   |  21.0<L>  |  1.18    Ca    7.9<L>      2017 07:51    TPro  6.2<L>  /  Alb  3.3  /  TBili  0.6  /  DBili  x   /  AST  16  /  ALT  17  /  AlkPhos  67  06-25    PT/INR - ( 2017 09:56 )   PT: 12.8 sec;   INR: 1.16 ratio         PTT - ( 2017 09:56 )  PTT:29.8 sec  Urinalysis Basic - ( 2017 22:21 )    Color: Yellow / Appearance: Clear / S.010 / pH: x  Gluc: x / Ketone: Negative  / Bili: Negative / Urobili: Negative mg/dL   Blood: x / Protein: 15 mg/dL / Nitrite: Negative   Leuk Esterase: Moderate / RBC: Negative /HPF / WBC 3-5   Sq Epi: x / Non Sq Epi: Few / Bacteria: x        RADIOLOGY & ADDITIONAL STUDIES:

## 2017-06-25 NOTE — CONSULT NOTE ADULT - PROBLEM SELECTOR RECOMMENDATION 2
The patient has mild hydronephrosis at best.  This may be secondary to a recently passed stone. However, you can have mild hydro with severe pyelonephritis.  No intervention necessary at the moment.  Continue with present care.  Will follow.

## 2017-06-25 NOTE — INPATIENT CERTIFICATION FOR MEDICARE PATIENTS - RISKS OF ADVERSE EVENTS
Concern for delay in diagnosis and treatment/Concern for renal deterioration/Concern for worsening infectious process

## 2017-06-25 NOTE — CONSULT NOTE ADULT - SUBJECTIVE AND OBJECTIVE BOX
[facility] [place] RACHEL JIMENEZ MD FACP   SHIV SHAW MD  Diplomates American Board of Internal Medicine and Infecctious Diseases  [phone]  [phone] F      DESTIN [id number]      HPI:  67 y/o female came to ER for one day duration of rt. flank pain, sharp, 8 out of 10 on pain scale.+ n/ v. + chills. no diarrhea. denies urine sympt. no cough. no cp. pt. denies any h/o renal stones. pt. got pain meds and iv fluids and feels better.  STILL WITH RIGHT FLANK PAIN AND WBC UP TO 22K  ASKED TO EVALUATE FROM ID STANDPOINT       PAST MEDICAL & SURGICAL HISTORY:  HTN (hypertension)  DM (diabetes mellitus)  History of benign brain tumor      ANTIBIOTICS  levoFLOXacin IVPB 500milliGRAM(s) IV Intermittent every 24 hours  cefTRIAXone   IVPB  IV Intermittent   cefTRIAXone   IVPB 1Gram(s) IV Intermittent every 24 hours      Allergies    No Known Allergies    Intolerances        SOCIAL HISTORY:    FAMILY HISTORY:  Family history of diabetes mellitus      Vital Signs Last 24 Hrs  T(C): 36.9, Max: 37.8 (06-24 @ 13:25)  T(F): 98.5, Max: 100 (06-24 @ 13:25)  HR: 89 (89 - 96)  BP: 99/57 (90/56 - 120/60)  BP(mean): --  RR: 16 (16 - 20)  SpO2: 94% (94% - 94%)  Drug Dosing Weight  Height (cm): 147.3 (2017 20:36)  Weight (kg): 69.9 (2017 20:36)  BMI (kg/m2): 32.2 (2017 20:36)  BSA (m2): 1.63 (2017 20:36)      REVIEW OF SYSTEMS:    CONSTITUTIONAL:  As per HPI.    HEENT:  Eyes:  No diplopia or blurred vision. ENT:  No earache, sore throat or runny nose.    CARDIOVASCULAR:  No pressure, squeezing, strangling, tightness, heaviness or aching about the chest, neck, axilla or epigastrium.    RESPIRATORY:  No cough, shortness of breath, PND or orthopnea.    GASTROINTESTINAL:  No nausea, vomiting or diarrhea.    GENITOURINARY:  No dysuria, frequency or urgency.    MUSCULOSKELETAL:  As per HPI.    SKIN:  No change in skin, hair or nails.    NEUROLOGIC:  No paresthesias, fasciculations, seizures or weakness.                  PHYSICAL EXAMINATION:    GENERAL: The patient is a well-developed, well-nourished IN NAD    VITAL SIGNS: T(C): 36.9, Max: 37.8 (06-24 @ 13:25)  HR: 89 (89 - 96)  BP: 99/57 (90/56 - 120/60)  RR: 16 (16 - 20)  SpO2: 94% (94% - 94%)  Wt(kg): --    HEENT: Head is normocephalic and atraumatic.  ANICTERIC  NECK: Supple. No carotid bruits.  No lymphadenopathy or thyromegaly.    LUNGS: COARSE BREATH SOUNDS    HEART: Regular rate and rhythm without murmur.    ABDOMEN: Soft, nontender, MILD RIGHT FLANK PAIN.  Positive bowel sounds.  No hepatosplenomegaly was noted. NO REBOUND NO GUARDING    EXTREMITIES: NO EDEMA NO ERYTHEMA    NEUROLOGIC: NON FOCAL      SKIN: No ulceration or induration present. NO RASH        BLOOD CULTURES       URINE CX          LABS:                        12.6   22.2  )-----------( 272      ( 2017 09:56 )             36.8     06-25    137  |  100  |  22.0<H>  ----------------------------<  312<H>  3.7   |  21.0<L>  |  1.18    Ca    7.9<L>      2017 07:51    TPro  6.2<L>  /  Alb  3.3  /  TBili  0.6  /  DBili  x   /  AST  16  /  ALT  17  /  AlkPhos  67  06-25    PT/INR - ( 2017 09:56 )   PT: 12.8 sec;   INR: 1.16 ratio         PTT - ( 2017 09:56 )  PTT:29.8 sec  Urinalysis Basic - ( 2017 22:21 )    Color: Yellow / Appearance: Clear / S.010 / pH: x  Gluc: x / Ketone: Negative  / Bili: Negative / Urobili: Negative mg/dL   Blood: x / Protein: 15 mg/dL / Nitrite: Negative   Leuk Esterase: Moderate / RBC: Negative /HPF / WBC 3-5   Sq Epi: x / Non Sq Epi: Few / Bacteria: x        RADIOLOGY & ADDITIONAL STUDIES:CT of the Abdomen and Pelvis was performed without intravenous contrast.  Intravenous contrast: None.  Oral contrast: None.  Sagittal and coronal reformats were performed.    FINDINGS:    LOWER CHEST: Nonspecific right posterior pleural calcification. Bibasilar  subsegmental atelectasis. Mild cardiomegaly. Coronary artery and aortic  valvular calcification    LIVER: Within normal limits.  BILE DUCTS: Normal caliber.  GALLBLADDER: Mildly distended and may contain layering high density sludge.  SPLEEN: Within normal limits.  PANCREAS: Within normal limits.  ADRENALS: Within normal limits.  KIDNEYS/URETERS: Moderate right hydroureteronephrosis and asymmetric right  perinephric stranding extending to the level of the urinary bladder. No  radiopaque obstructing calculus identified. Findings may represent sequelae  of recently passed stone. No left-sided hydronephrosis or nephrolithiasis.    BLADDER: Within normal limits.  REPRODUCTIVE ORGANS: Status post hysterectomy.    BOWEL: No bowel obstruction. Minimal colonic diverticulosis. Normal  appendix. Small hiatal hernia.  PERITONEUM: No ascites.  VESSELS: Calcified atherosclerosis of the abdominal aorta and its branch  vessels.  RETROPERITONEUM: No lymphadenopathy.  ABDOMINAL WALL: Within normal limits.  BONES: Multilevel degenerative changes of the spine.    IMPRESSION:    Moderate right hydroureteronephrosis to the level of the urinary bladder. No  radiopaque obstructing calculus identified. Findings may represent the  sequelae of recently passed stone. Correlation with urinalysis to rule out  infection.                LV VASQUES M.D., ATTENDING RADIOLOGIST  This document has been electronically signed        ASSESSMENT/PLAN    67YO FEMALE  [place] SPEAKING  WITH RIGHT FLANK PAIN IMAGING C/W RIGHT HYDROURETERONEPHROSIS   WBC UP WILL SEND URINE CX AND BLOOD CX X2 SETS   WILL CHANGE TO INVANZ AS POTENTIAL FOR RESISTANT ORGANISMS   WILL FOLLOW UP  UROLOGY TO EVALUATE            SHIV PARHAM MD

## 2017-06-25 NOTE — PROGRESS NOTE ADULT - SUBJECTIVE AND OBJECTIVE BOX
HPI:  65 y/o female came to ER for one day duration of rt. flank pain, sharp, 8 out of 10 on pain scale.+ n/ v. + chills. no diarrhea. denies urine sympt. no cough. no cp. pt. denies any h/o renal stones. pt. got pain meds and iv fluids and feels better. no other complaints at this point. (2017 06:38)    Female  PAST MEDICAL & SURGICAL HISTORY:  HTN (hypertension)  DM (diabetes mellitus)  History of benign brain tumor      REVIEW OF SYSTEMS      General:	obesity    Skin/Breast:       negative   	  Ophthalmologic:  negative   	  ENMT:	    diabetic retinopathy    Respiratory and Thorax:   normal   	  Cardiovascular:	  hypertension     Gastrointestinal:	  fatty liver     Genitourinary:	  negative     Musculoskeletal:	obesity    Neurological:	negative     Psychiatric:	negative     Hematology/Lymphatics:  negative 	    Endocrine:   obesity,  diabetes, hypercholesterolemia 	    Allergic/Immunologic:	NKA    MEDICATIONS  (STANDING):  nortriptyline 25milliGRAM(s) Oral daily  famotidine    Tablet 20milliGRAM(s) Oral two times a day  folic acid 1milliGRAM(s) Oral daily  LORazepam   Injectable 1milliGRAM(s) IV Push once  levoFLOXacin IVPB 500milliGRAM(s) IV Intermittent every 24 hours  enoxaparin Injectable 40milliGRAM(s) SubCutaneous daily  sodium chloride 0.9%. 1000milliLiter(s) IV Continuous <Continuous>  amLODIPine   Tablet 5milliGRAM(s) Oral daily  insulin lispro (HumaLOG) corrective regimen sliding scale  SubCutaneous three times a day before meals  dextrose 5%. 1000milliLiter(s) IV Continuous <Continuous>  dextrose 50% Injectable 12.5Gram(s) IV Push once  dextrose 50% Injectable 25Gram(s) IV Push once  dextrose 50% Injectable 25Gram(s) IV Push once  cefTRIAXone   IVPB  IV Intermittent   cefTRIAXone   IVPB 1Gram(s) IV Intermittent every 24 hours    MEDICATIONS  (PRN):  ondansetron Injectable 4milliGRAM(s) IV Push every 6 hours PRN Nausea and/or Vomiting  dextrose Gel 1Dose(s) Oral once PRN Blood Glucose LESS THAN 70 milliGRAM(s)/deciliter  glucagon  Injectable 1milliGRAM(s) IntraMuscular once PRN Glucose LESS THAN 70 milligrams/deciliter  morphine  - Injectable 2milliGRAM(s) IV Push every 4 hours PRN Moderate Pain (4 - 6)      Allergies    No Known Allergies    Intolerances        SOCIAL HISTORY:    FAMILY HISTORY:  Family history of diabetes mellitus      Vital Signs Last 24 Hrs  T(C): 37.5, Max: 37.8 ( @ 13:25)  T(F): 99.5, Max: 100 ( @ 13:25)  HR: 90 (88 - 97)  BP: 106/58 (90/56 - 137/84)  BP(mean): --  RR: 18 (18 - 20)  SpO2: 94% (94% - 98%)    PHYSICAL EXAM:      Constitutional:  obesity , awake alert, oriented , feeling nauseous, pain is better controlled, right flank pain and tenderness.    Eyes: refraction disorder    ENMT:  negative     Neck:  supple, bilateral carotid no bruits, veins  flat.    Breasts:  normal      Back: normal      Respiratory:  normal     Cardiovascular:  NSR, no murmur     Gastrointestinal:  soft no distention, no ascites,  Right flak  tenderness.     Genitourinary:    Rectal:    Extremities: no edema, no DVT    Vascular:  normal pulses    Neurological: normal      Skin:  negative     Lymph Nodes:  negative     Musculoskeletal:  negative     Psychiatric:  negative         LABS:  CBC Full  -  ( 2017 09:56 )  WBC Count : 22.2 K/uL  Hemoglobin : 12.6 g/dL  Hematocrit : 36.8 %  Platelet Count - Automated : 272 K/uL  Mean Cell Volume : 83.6 fl  Mean Cell Hemoglobin : 28.6 pg  Mean Cell Hemoglobin Concentration : 34.2 g/dL  Auto Neutrophil # : x  Auto Lymphocyte # : x  Auto Monocyte # : x  Auto Eosinophil # : x  Auto Basophil # : x  Auto Neutrophil % : 85.0 %  Auto Lymphocyte % : 4.0 %  Auto Monocyte % : 3.0 %  Auto Eosinophil % : x  Auto Basophil % : x        134<L>  |  96<L>  |  29.0<H>  ----------------------------<  316<H>  4.3   |  21.0<L>  |  1.34<H>    Ca    8.2<L>      2017 09:56    TPro  7.2  /  Alb  4.2  /  TBili  0.4  /  DBili  x   /  AST  23  /  ALT  25  /  AlkPhos  78  06-23    PT/INR - ( 2017 09:56 )   PT: 12.8 sec;   INR: 1.16 ratio         PTT - ( 2017 09:56 )  PTT:29.8 sec  Urinalysis Basic - ( 2017 22:21 )    Color: Yellow / Appearance: Clear / S.010 / pH: x  Gluc: x / Ketone: Negative  / Bili: Negative / Urobili: Negative mg/dL   Blood: x / Protein: 15 mg/dL / Nitrite: Negative   Leuk Esterase: Moderate / RBC: Negative /HPF / WBC 3-5   Sq Epi: x / Non Sq Epi: Few / Bacteria: x        RADIOLOGY & ADDITIONAL STUDIES:

## 2017-06-26 LAB
CULTURE RESULTS: NO GROWTH — SIGNIFICANT CHANGE UP
SPECIMEN SOURCE: SIGNIFICANT CHANGE UP

## 2017-06-26 RX ADMIN — Medication 10: at 12:42

## 2017-06-26 RX ADMIN — Medication 8: at 08:45

## 2017-06-26 RX ADMIN — FAMOTIDINE 20 MILLIGRAM(S): 10 INJECTION INTRAVENOUS at 05:19

## 2017-06-26 RX ADMIN — ERTAPENEM SODIUM 120 MILLIGRAM(S): 1 INJECTION, POWDER, LYOPHILIZED, FOR SOLUTION INTRAMUSCULAR; INTRAVENOUS at 12:47

## 2017-06-26 RX ADMIN — MORPHINE SULFATE 2 MILLIGRAM(S): 50 CAPSULE, EXTENDED RELEASE ORAL at 13:20

## 2017-06-26 RX ADMIN — SODIUM CHLORIDE 150 MILLILITER(S): 9 INJECTION INTRAMUSCULAR; INTRAVENOUS; SUBCUTANEOUS at 05:21

## 2017-06-26 RX ADMIN — Medication 6: at 17:25

## 2017-06-26 RX ADMIN — Medication 1 MILLIGRAM(S): at 12:47

## 2017-06-26 RX ADMIN — NORTRIPTYLINE HYDROCHLORIDE 25 MILLIGRAM(S): 10 CAPSULE ORAL at 12:48

## 2017-06-26 RX ADMIN — MORPHINE SULFATE 2 MILLIGRAM(S): 50 CAPSULE, EXTENDED RELEASE ORAL at 12:48

## 2017-06-26 RX ADMIN — FAMOTIDINE 20 MILLIGRAM(S): 10 INJECTION INTRAVENOUS at 17:24

## 2017-06-26 RX ADMIN — ENOXAPARIN SODIUM 40 MILLIGRAM(S): 100 INJECTION SUBCUTANEOUS at 17:25

## 2017-06-26 RX ADMIN — AMLODIPINE BESYLATE 5 MILLIGRAM(S): 2.5 TABLET ORAL at 05:19

## 2017-06-26 RX ADMIN — SODIUM CHLORIDE 150 MILLILITER(S): 9 INJECTION INTRAMUSCULAR; INTRAVENOUS; SUBCUTANEOUS at 18:45

## 2017-06-26 NOTE — PROGRESS NOTE ADULT - SUBJECTIVE AND OBJECTIVE BOX
HPI:  67 y/o female came to ER for one day duration of rt. flank pain, sharp, 8 out of 10 on pain scale.+ n/ v. + chills. no diarrhea. denies urine sympt. no cough. no cp. pt. denies any h/o renal stones. pt. got pain meds and iv fluids and feels better. no other complaints at this point. (24 Jun 2017 06:38)    Female  PAST MEDICAL & SURGICAL HISTORY:  HTN (hypertension)  DM (diabetes mellitus)  History of benign brain tumor      REVIEW OF SYSTEMS      General:	obesity    Skin/Breast:   negative   	  Ophthalmologic:  diabetes  retinopathy  	  ENMT:	  negative     Respiratory and Thorax: normal   	  Cardiovascular:	  hypertension     Gastrointestinal:	 negative     Genitourinary:	 negative     Musculoskeletal:	negative     Neurological:    s/p brain biopsy, benign lesion.    Psychiatric:	negative     Hematology/Lymphatics:  negative 	    Endocrine:	obesity, diabetes     Allergic/Immunologic:	negative     MEDICATIONS  (STANDING):  nortriptyline 25milliGRAM(s) Oral daily  famotidine    Tablet 20milliGRAM(s) Oral two times a day  folic acid 1milliGRAM(s) Oral daily  LORazepam   Injectable 1milliGRAM(s) IV Push once  enoxaparin Injectable 40milliGRAM(s) SubCutaneous daily  sodium chloride 0.9%. 1000milliLiter(s) IV Continuous <Continuous>  amLODIPine   Tablet 5milliGRAM(s) Oral daily  insulin lispro (HumaLOG) corrective regimen sliding scale  SubCutaneous three times a day before meals  dextrose 5%. 1000milliLiter(s) IV Continuous <Continuous>  dextrose 50% Injectable 12.5Gram(s) IV Push once  dextrose 50% Injectable 25Gram(s) IV Push once  dextrose 50% Injectable 25Gram(s) IV Push once  ertapenem  IVPB 1000milliGRAM(s) IV Intermittent every 24 hours  ertapenem  IVPB  IV Intermittent     MEDICATIONS  (PRN):  ondansetron Injectable 4milliGRAM(s) IV Push every 6 hours PRN Nausea and/or Vomiting  dextrose Gel 1Dose(s) Oral once PRN Blood Glucose LESS THAN 70 milliGRAM(s)/deciliter  glucagon  Injectable 1milliGRAM(s) IntraMuscular once PRN Glucose LESS THAN 70 milligrams/deciliter  morphine  - Injectable 2milliGRAM(s) IV Push every 4 hours PRN Moderate Pain (4 - 6)      Allergies    No Known Allergies    Intolerances        SOCIAL HISTORY:    FAMILY HISTORY:  Family history of diabetes mellitus      Vital Signs Last 24 Hrs  T(C): 36.5, Max: 37.1 (06-25 @ 22:38)  T(F): 97.7, Max: 98.7 (06-25 @ 22:38)  HR: 90 (88 - 100)  BP: 146/80 (118/70 - 152/85)  BP(mean): --  RR: 20 (18 - 20)  SpO2: 97% (97% - 97%)    PHYSICAL EXAM:   examined earlier       Constitutional:  well nourish awake alert oriented , afebrile     Eyes:  refraction disorder     ENMT:  negative     Neck:  normal     Breasts:  negative     Back:  normal     Respiratory:  normal     Cardiovascular:  normals     Gastrointestinal:  normal     Genitourinary: positive  right flank tenderness     Rectal:    Extremities:  no edema     Vascular:  normal pulses     Neurological:  negative     Skin:  normal     Lymph Nodes:  negative     Musculoskeletal:  obesity     Psychiatric:  negative         LABS:  CBC Full  -  ( 25 Jun 2017 12:15 )  WBC Count : 23.2 K/uL  Hemoglobin : 11.0 g/dL  Hematocrit : 32.3 %  Platelet Count - Automated : 216 K/uL  Mean Cell Volume : 85.0 fl  Mean Cell Hemoglobin : 28.9 pg  Mean Cell Hemoglobin Concentration : 34.1 g/dL  Auto Neutrophil # : x  Auto Lymphocyte # : x  Auto Monocyte # : x  Auto Eosinophil # : x  Auto Basophil # : x  Auto Neutrophil % : x  Auto Lymphocyte % : x  Auto Monocyte % : x  Auto Eosinophil % : x  Auto Basophil % : x    06-25    137  |  100  |  22.0<H>  ----------------------------<  312<H>  3.7   |  21.0<L>  |  1.18    Ca    7.9<L>      25 Jun 2017 07:51    TPro  6.2<L>  /  Alb  3.3  /  TBili  0.6  /  DBili  x   /  AST  16  /  ALT  17  /  AlkPhos  67  06-25          RADIOLOGY & ADDITIONAL STUDIES:      problem  # 1        pyelonephritis without stones ,  clinically better  afebrile ,     will continue IV fluid  and INvanz, will follow culture , will continue  monitoring  lytes and  WBC       discussed case  with patient,         Avila Domínguez

## 2017-06-26 NOTE — PROGRESS NOTE ADULT - SUBJECTIVE AND OBJECTIVE BOX
afebrile  on invanz.  WBC still elevated at 22.  has had diarrhea since Friday.  normally has good bowel movements.    barely eats.    no history of urolithiasis or family history of urolithiasis.  drinks lots of water      Exam:   NAD  warm  nonedematous.      Assessment and Plan;  66 year old lady with hydro ?secondary to pyelo or stone.    culture is negative.  antibiotics per primary team.  diabetes poorly controlled currently >300.  diabetes education may be warranted.    consider renal scan if WBC fails to come down in a day or 2.

## 2017-06-27 LAB
ANION GAP SERPL CALC-SCNC: 14 MMOL/L — SIGNIFICANT CHANGE UP (ref 5–17)
BUN SERPL-MCNC: 9 MG/DL — SIGNIFICANT CHANGE UP (ref 8–20)
CALCIUM SERPL-MCNC: 8.6 MG/DL — SIGNIFICANT CHANGE UP (ref 8.6–10.2)
CHLORIDE SERPL-SCNC: 98 MMOL/L — SIGNIFICANT CHANGE UP (ref 98–107)
CO2 SERPL-SCNC: 24 MMOL/L — SIGNIFICANT CHANGE UP (ref 22–29)
CREAT SERPL-MCNC: 0.61 MG/DL — SIGNIFICANT CHANGE UP (ref 0.5–1.3)
GLUCOSE SERPL-MCNC: 263 MG/DL — HIGH (ref 70–115)
HCT VFR BLD CALC: 34.4 % — LOW (ref 37–47)
HGB BLD-MCNC: 12 G/DL — SIGNIFICANT CHANGE UP (ref 12–16)
MCHC RBC-ENTMCNC: 28.5 PG — SIGNIFICANT CHANGE UP (ref 27–31)
MCHC RBC-ENTMCNC: 34.9 G/DL — SIGNIFICANT CHANGE UP (ref 32–36)
MCV RBC AUTO: 81.7 FL — SIGNIFICANT CHANGE UP (ref 81–99)
PLATELET # BLD AUTO: 252 K/UL — SIGNIFICANT CHANGE UP (ref 150–400)
POTASSIUM SERPL-MCNC: 3 MMOL/L — LOW (ref 3.5–5.3)
POTASSIUM SERPL-SCNC: 3 MMOL/L — LOW (ref 3.5–5.3)
RBC # BLD: 4.21 M/UL — LOW (ref 4.4–5.2)
RBC # FLD: 14.3 % — SIGNIFICANT CHANGE UP (ref 11–15.6)
SODIUM SERPL-SCNC: 136 MMOL/L — SIGNIFICANT CHANGE UP (ref 135–145)
WBC # BLD: 11.9 K/UL — HIGH (ref 4.8–10.8)
WBC # FLD AUTO: 11.9 K/UL — HIGH (ref 4.8–10.8)

## 2017-06-27 PROCEDURE — 99232 SBSQ HOSP IP/OBS MODERATE 35: CPT

## 2017-06-27 RX ORDER — INSULIN LISPRO 100/ML
4 VIAL (ML) SUBCUTANEOUS ONCE
Qty: 0 | Refills: 0 | Status: COMPLETED | OUTPATIENT
Start: 2017-06-27 | End: 2017-06-27

## 2017-06-27 RX ORDER — INSULIN LISPRO 100/ML
6 VIAL (ML) SUBCUTANEOUS ONCE
Qty: 0 | Refills: 0 | Status: DISCONTINUED | OUTPATIENT
Start: 2017-06-27 | End: 2017-06-27

## 2017-06-27 RX ORDER — CIPROFLOXACIN LACTATE 400MG/40ML
400 VIAL (ML) INTRAVENOUS ONCE
Qty: 0 | Refills: 0 | Status: COMPLETED | OUTPATIENT
Start: 2017-06-27 | End: 2017-06-27

## 2017-06-27 RX ORDER — INSULIN LISPRO 100/ML
VIAL (ML) SUBCUTANEOUS
Qty: 0 | Refills: 0 | Status: DISCONTINUED | OUTPATIENT
Start: 2017-06-27 | End: 2017-06-29

## 2017-06-27 RX ORDER — CIPROFLOXACIN LACTATE 400MG/40ML
400 VIAL (ML) INTRAVENOUS EVERY 12 HOURS
Qty: 0 | Refills: 0 | Status: DISCONTINUED | OUTPATIENT
Start: 2017-06-27 | End: 2017-06-28

## 2017-06-27 RX ORDER — CIPROFLOXACIN LACTATE 400MG/40ML
VIAL (ML) INTRAVENOUS
Qty: 0 | Refills: 0 | Status: DISCONTINUED | OUTPATIENT
Start: 2017-06-27 | End: 2017-06-28

## 2017-06-27 RX ADMIN — Medication 200 MILLIGRAM(S): at 17:02

## 2017-06-27 RX ADMIN — FAMOTIDINE 20 MILLIGRAM(S): 10 INJECTION INTRAVENOUS at 17:02

## 2017-06-27 RX ADMIN — NORTRIPTYLINE HYDROCHLORIDE 25 MILLIGRAM(S): 10 CAPSULE ORAL at 12:36

## 2017-06-27 RX ADMIN — Medication 4: at 17:02

## 2017-06-27 RX ADMIN — FAMOTIDINE 20 MILLIGRAM(S): 10 INJECTION INTRAVENOUS at 05:08

## 2017-06-27 RX ADMIN — Medication 10: at 12:36

## 2017-06-27 RX ADMIN — Medication 200 MILLIGRAM(S): at 13:36

## 2017-06-27 RX ADMIN — Medication 4 UNIT(S): at 22:10

## 2017-06-27 RX ADMIN — AMLODIPINE BESYLATE 5 MILLIGRAM(S): 2.5 TABLET ORAL at 05:08

## 2017-06-27 RX ADMIN — Medication 1 MILLIGRAM(S): at 12:36

## 2017-06-27 RX ADMIN — Medication 6: at 09:07

## 2017-06-27 RX ADMIN — ENOXAPARIN SODIUM 40 MILLIGRAM(S): 100 INJECTION SUBCUTANEOUS at 12:36

## 2017-06-27 NOTE — PROGRESS NOTE ADULT - SUBJECTIVE AND OBJECTIVE BOX
[facility]  INFECTIOUS DISEASES AND INTERNAL MEDICINE OF [place] [facility]  =======================================================  Angelo Lazcano MD  Diplomates American Board of Internal Medicine and Infectious Diseases  =======================================================    DESTIN CASH [id number]  chief complaint:    patient seen and examined in follow up.  Chart and labs reviewed.     =======================================================  Allergies:  No Known Allergies      =======================================================  Medications:  nortriptyline 25 milliGRAM(s) Oral daily  famotidine    Tablet 20 milliGRAM(s) Oral two times a day  folic acid 1 milliGRAM(s) Oral daily  LORazepam   Injectable 1 milliGRAM(s) IV Push once  ondansetron Injectable 4 milliGRAM(s) IV Push every 6 hours PRN  enoxaparin Injectable 40 milliGRAM(s) SubCutaneous daily  sodium chloride 0.9%. 1000 milliLiter(s) IV Continuous <Continuous>  amLODIPine   Tablet 5 milliGRAM(s) Oral daily  insulin lispro (HumaLOG) corrective regimen sliding scale   SubCutaneous three times a day before meals  dextrose 5%. 1000 milliLiter(s) IV Continuous <Continuous>  dextrose Gel 1 Dose(s) Oral once PRN  dextrose 50% Injectable 12.5 Gram(s) IV Push once  dextrose 50% Injectable 25 Gram(s) IV Push once  dextrose 50% Injectable 25 Gram(s) IV Push once  glucagon  Injectable 1 milliGRAM(s) IntraMuscular once PRN  morphine  - Injectable 2 milliGRAM(s) IV Push every 4 hours PRN  ciprofloxacin   IVPB   IV Intermittent        =======================================================     REVIEW OF SYSTEMS:  CONSTITUTIONAL:  No Fever or chills  HEENT:   No diplopia or blurred vision.  No earache, sore throat or runny nose.  CARDIOVASCULAR:  No pressure, squeezing, strangling, tightness, heaviness or aching about the chest, neck, axilla or epigastrium.  RESPIRATORY:  No cough, shortness of breath, PND or orthopnea.  GASTROINTESTINAL:  No nausea, vomiting or diarrhea.  GENITOURINARY:  No dysuria, frequency or urgency. No Blood in urine  MUSCULOSKELETAL:  no joint aches, no muscle pain  SKIN:  No change in skin, hair or nails.  NEUROLOGIC:  No paresthesias, fasciculations, seizures or weakness.  PSYCHIATRIC:  No disorder of thought or mood.  ENDOCRINE:  No heat or cold intolerance, polyuria or polydipsia.  HEMATOLOGICAL:  No easy bruising or bleeding.     =======================================================     Physical Exam:  ICU Vital Signs Last 24 Hrs  T(C): 37.3 (27 Jun 2017 07:48), Max: 37.3 (27 Jun 2017 07:48)  T(F): 99.1 (27 Jun 2017 07:48), Max: 99.1 (27 Jun 2017 07:48)  HR: 86 (27 Jun 2017 07:48) (86 - 90)  BP: 143/75 (27 Jun 2017 07:48) (134/71 - 146/80)  RR: 18 (27 Jun 2017 07:48) (18 - 18)  SpO2: 97% (27 Jun 2017 05:03) (97% - 97%)    GEN: NAD, pleasant  HEENT: normocephalic and atraumatic. EOMI. MACHO.    NECK: Supple. No carotid bruits.  No lymphadenopathy or thyromegaly.  LUNGS: Clear to auscultation.  HEART: Regular rate and rhythm without murmur.  ABDOMEN: Soft, nontender, and nondistended.  Positive bowel sounds.    : No CVA tenderness  EXTREMITIES: Without any cyanosis, clubbing, rash, lesions or edema.  MSK: no joint swelling  NEUROLOGIC: Cranial nerves II through XII are grossly intact.  PSYCHIATRIC: Appropriate affect .  SKIN: No ulceration or induration present.    =======================================================    Labs:  06-27    136  |  98  |  9.0  ----------------------------<  263<H>  3.0<L>   |  24.0  |  0.61    Ca    8.6      27 Jun 2017 06:35                            12.0   11.9  )-----------( 252      ( 27 Jun 2017 06:35 )             34.4            CAPILLARY BLOOD GLUCOSE  268 (27 Jun 2017 08:17)  298 (26 Jun 2017 17:36)  298 (26 Jun 2017 17:22)  274 (26 Jun 2017 13:15)        Culture - Urine (06.25.17 @ 11:00)    Specimen Source: .Urine Catheterized    Culture Results:   No growth [facility]  INFECTIOUS DISEASES AND INTERNAL MEDIC[facility] [place] [facility]  =======================================================  Angelo Sebastian MD [facility] HospitalZULAY Lazcano MD  Diplomates American Board of Internal Medicine and Infectious Diseases  =======================================================    DESTIN CASH [id number]  chief complaint: Right pyelo    patient seen and examined in follow up.  Chart and labs reviewed.     no more fevers, no more chills. Slight right flank pain    =======================================================  Allergies:  No Known Allergies      =======================================================  Medications:  nortriptyline 25 milliGRAM(s) Oral daily  famotidine    Tablet 20 milliGRAM(s) Oral two times a day  folic acid 1 milliGRAM(s) Oral daily  LORazepam   Injectable 1 milliGRAM(s) IV Push once  ondansetron Injectable 4 milliGRAM(s) IV Push every 6 hours PRN  enoxaparin Injectable 40 milliGRAM(s) SubCutaneous daily  sodium chloride 0.9%. 1000 milliLiter(s) IV Continuous <Continuous>  amLODIPine   Tablet 5 milliGRAM(s) Oral daily  insulin lispro (HumaLOG) corrective regimen sliding scale   SubCutaneous three times a day before meals  dextrose 5%. 1000 milliLiter(s) IV Continuous <Continuous>  dextrose Gel 1 Dose(s) Oral once PRN  dextrose 50% Injectable 12.5 Gram(s) IV Push once  dextrose 50% Injectable 25 Gram(s) IV Push once  dextrose 50% Injectable 25 Gram(s) IV Push once  glucagon  Injectable 1 milliGRAM(s) IntraMuscular once PRN  morphine  - Injectable 2 milliGRAM(s) IV Push every 4 hours PRN  ciprofloxacin   IVPB   IV Intermittent        =======================================================     REVIEW OF SYSTEMS:  CONSTITUTIONAL:  No Fever or chills  HEENT:   No diplopia or blurred vision.  No earache, sore throat or runny nose.  CARDIOVASCULAR:  No pressure, squeezing, strangling, tightness, heaviness or aching about the chest, neck, axilla or epigastrium.  RESPIRATORY:  No cough, shortness of breath, PND or orthopnea.  GASTROINTESTINAL:  No nausea, vomiting or diarrhea.  GENITOURINARY:  No dysuria, frequency or urgency. No Blood in urine  MUSCULOSKELETAL:  no joint aches, no muscle pain  SKIN:  No change in skin, hair or nails.  NEUROLOGIC:  No paresthesias, fasciculations, seizures or weakness.  PSYCHIATRIC:  No disorder of thought or mood.  ENDOCRINE:  No heat or cold intolerance, polyuria or polydipsia.  HEMATOLOGICAL:  No easy bruising or bleeding.     =======================================================     Physical Exam:  ICU Vital Signs Last 24 Hrs  T(C): 37.3 (27 Jun 2017 07:48), Max: 37.3 (27 Jun 2017 07:48)  T(F): 99.1 (27 Jun 2017 07:48), Max: 99.1 (27 Jun 2017 07:48)  HR: 86 (27 Jun 2017 07:48) (86 - 90)  BP: 143/75 (27 Jun 2017 07:48) (134/71 - 146/80)  RR: 18 (27 Jun 2017 07:48) (18 - 18)  SpO2: 97% (27 Jun 2017 05:03) (97% - 97%)    GEN: NAD, pleasant  HEENT: normocephalic and atraumatic. EOMI. MACHO.    NECK: Supple. No carotid bruits.  No lymphadenopathy or thyromegaly.  LUNGS: Clear to auscultation.  HEART: Regular rate and rhythm without murmur.  ABDOMEN: Soft, nontender, and nondistended.  Positive bowel sounds.    : RIGHT CVA tenderness; MILD  EXTREMITIES: Without any cyanosis, clubbing, rash, lesions or edema.  MSK: no joint swelling  NEUROLOGIC: Cranial nerves II through XII are grossly intact.  PSYCHIATRIC: Appropriate affect .  SKIN: No ulceration or induration present.    =======================================================    Labs:  06-27    136  |  98  |  9.0  ----------------------------<  263<H>  3.0<L>   |  24.0  |  0.61    Ca    8.6      27 Jun 2017 06:35                          12.0   11.9  )-----------( 252      ( 27 Jun 2017 06:35 )             34.4       CAPILLARY BLOOD GLUCOSE  268 (27 Jun 2017 08:17)  298 (26 Jun 2017 17:36)  298 (26 Jun 2017 17:22)  274 (26 Jun 2017 13:15)        Culture - Urine (06.25.17 @ 11:00)    Specimen Source: .Urine Catheterized    Culture Results:   No growth

## 2017-06-27 NOTE — PROGRESS NOTE ADULT - SUBJECTIVE AND OBJECTIVE BOX
HPI:  67 y/o female came to ER for one day duration of rt. flank pain, sharp, 8 out of 10 on pain scale.+ n/ v. + chills. no diarrhea. denies urine sympt. no cough. no cp. pt. denies any h/o renal stones. pt. got pain meds and iv fluids and feels better. no other complaints at this point. (24 Jun 2017 06:38)    Female  PAST MEDICAL & SURGICAL HISTORY:  HTN (hypertension)  DM (diabetes mellitus)  History of benign brain tumor      REVIEW OF SYSTEMS      General:	obesity.    Skin/Breast:    normal   	  Ophthalmologic: diabetic retinopathy  	  ENMT:	  negative     Respiratory and Thorax: negative   	  Cardiovascular:	hypertension.    Gastrointestinal:	 negative     Genitourinary:	negative, no history of kidney stones, or hematuria.    Musculoskeletal:    obesity 	    Neurological:	s/p  brain biopsy of  benign lesion and  Bell's palsy.    Psychiatric:	 negative     Hematology/Lymphatics:  negative 	    Endocrine:  obesity, diabetes, 	    Allergic/Immunologic:	NKA.    MEDICATIONS  (STANDING):  nortriptyline 25milliGRAM(s) Oral daily  famotidine    Tablet 20milliGRAM(s) Oral two times a day  folic acid 1milliGRAM(s) Oral daily  LORazepam   Injectable 1milliGRAM(s) IV Push once  enoxaparin Injectable 40milliGRAM(s) SubCutaneous daily  sodium chloride 0.9%. 1000milliLiter(s) IV Continuous <Continuous>  amLODIPine   Tablet 5milliGRAM(s) Oral daily  insulin lispro (HumaLOG) corrective regimen sliding scale  SubCutaneous three times a day before meals  dextrose 5%. 1000milliLiter(s) IV Continuous <Continuous>  dextrose 50% Injectable 12.5Gram(s) IV Push once  dextrose 50% Injectable 25Gram(s) IV Push once  dextrose 50% Injectable 25Gram(s) IV Push once  ertapenem  IVPB 1000milliGRAM(s) IV Intermittent every 24 hours  ertapenem  IVPB  IV Intermittent     MEDICATIONS  (PRN):  ondansetron Injectable 4milliGRAM(s) IV Push every 6 hours PRN Nausea and/or Vomiting  dextrose Gel 1Dose(s) Oral once PRN Blood Glucose LESS THAN 70 milliGRAM(s)/deciliter  glucagon  Injectable 1milliGRAM(s) IntraMuscular once PRN Glucose LESS THAN 70 milligrams/deciliter  morphine  - Injectable 2milliGRAM(s) IV Push every 4 hours PRN Moderate Pain (4 - 6)      Allergies    No Known Allergies    Intolerances    none     SOCIAL HISTORY:  lives with family.     FAMILY HISTORY:  Family history of diabetes mellitus      Vital Signs Last 24 Hrs  T(C): 36.7, Max: 36.8 (06-26 @ 09:08)  T(F): 98, Max: 98.3 (06-26 @ 09:08)  HR: 89 (88 - 100)  BP: 134/71 (134/71 - 152/85)  BP(mean): --  RR: 18 (18 - 20)  SpO2: 97% (97% - 97%)    PHYSICAL EXAM:      Constitutional:  well nourish, awake alert, oriented, states feeling better, less pain 6/10, mild nausea.    Eyes:  refraction disorder    ENMT:  normal.     Neck: supple, no neck mass, no bruits, veins flat.    Breasts:  normal     Back:  violet.     Respiratory: clear    Cardiovascular: normal sinus rhythm, no murmur      Gastrointestinal:  abdomen  soft non tender,     Genitourinary:      Rectal:      Extremities:  nor edema     Vascular:  normal pulses.     Neurological:  no focal,     Skin:  normal     Lymph Nodes:    Musculoskeletal: obesity     Psychiatric: normal         LABS:  CBC Full  -  ( 25 Jun 2017 12:15 )  WBC Count : 23.2 K/uL  Hemoglobin : 11.0 g/dL  Hematocrit : 32.3 %  Platelet Count - Automated : 216 K/uL  Mean Cell Volume : 85.0 fl  Mean Cell Hemoglobin : 28.9 pg  Mean Cell Hemoglobin Concentration : 34.1 g/dL  Auto Neutrophil # : x  Auto Lymphocyte # : x  Auto Monocyte # : x  Auto Eosinophil # : x  Auto Basophil # : x  Auto Neutrophil % : x  Auto Lymphocyte % : x  Auto Monocyte % : x  Auto Eosinophil % : x  Auto Basophil % : x    06-25    137  |  100  |  22.0<H>  ----------------------------<  312<H>  3.7   |  21.0<L>  |  1.18    Ca    7.9<L>      25 Jun 2017 07:51    TPro  6.2<L>  /  Alb  3.3  /  TBili  0.6  /  DBili  x   /  AST  16  /  ALT  17  /  AlkPhos  67  06-25          RADIOLOGY & ADDITIONAL STUDIES:

## 2017-06-27 NOTE — PROGRESS NOTE ADULT - SUBJECTIVE AND OBJECTIVE BOX
INTERVAL HPI/OVERNIGHT EVENTS: Improved right abd pain.  MEDICATIONS  (STANDING):  nortriptyline 25 milliGRAM(s) Oral daily  famotidine    Tablet 20 milliGRAM(s) Oral two times a day  folic acid 1 milliGRAM(s) Oral daily  LORazepam   Injectable 1 milliGRAM(s) IV Push once  enoxaparin Injectable 40 milliGRAM(s) SubCutaneous daily  sodium chloride 0.9%. 1000 milliLiter(s) (150 mL/Hr) IV Continuous <Continuous>  amLODIPine   Tablet 5 milliGRAM(s) Oral daily  insulin lispro (HumaLOG) corrective regimen sliding scale   SubCutaneous three times a day before meals  dextrose 5%. 1000 milliLiter(s) (50 mL/Hr) IV Continuous <Continuous>  dextrose 50% Injectable 12.5 Gram(s) IV Push once  dextrose 50% Injectable 25 Gram(s) IV Push once  dextrose 50% Injectable 25 Gram(s) IV Push once  ciprofloxacin   IVPB 400 milliGRAM(s) IV Intermittent every 12 hours  ciprofloxacin   IVPB   IV Intermittent     MEDICATIONS  (PRN):  ondansetron Injectable 4 milliGRAM(s) IV Push every 6 hours PRN Nausea and/or Vomiting  dextrose Gel 1 Dose(s) Oral once PRN Blood Glucose LESS THAN 70 milliGRAM(s)/deciliter  glucagon  Injectable 1 milliGRAM(s) IntraMuscular once PRN Glucose LESS THAN 70 milligrams/deciliter  morphine  - Injectable 2 milliGRAM(s) IV Push every 4 hours PRN Moderate Pain (4 - 6)      Allergies    No Known Allergies    Intolerances        Vital Signs Last 24 Hrs  T(C): 37.3 (27 Jun 2017 07:48), Max: 37.3 (27 Jun 2017 07:48)  T(F): 99.1 (27 Jun 2017 07:48), Max: 99.1 (27 Jun 2017 07:48)  HR: 86 (27 Jun 2017 07:48) (86 - 90)  BP: 143/75 (27 Jun 2017 07:48) (134/71 - 146/80)  BP(mean): --  RR: 18 (27 Jun 2017 07:48) (18 - 18)  SpO2: 97% (27 Jun 2017 05:03) (97% - 97%)     ON PE:  General: alert and awake  Abdomen: soft, mild right abd tenderness, nd, bs+       LABS:                        12.0   11.9  )-----------( 252      ( 27 Jun 2017 06:35 )             34.4     06-27    136  |  98  |  9.0  ----------------------------<  263<H>  3.0<L>   |  24.0  |  0.61    Ca    8.6      27 Jun 2017 06:35            RADIOLOGY & ADDITIONAL TESTS:

## 2017-06-27 NOTE — PROGRESS NOTE ADULT - ASSESSMENT
66 y.o. F with RIGHT HYDROURETERONEPHROSIS and right pyelonephritis.  Was given Levaquin prior to entry to hospital.   Urine culture in hospital remain negative; blood cx x 2 sets sent 6/25/17 negative to date

## 2017-06-28 LAB
ANION GAP SERPL CALC-SCNC: 17 MMOL/L — SIGNIFICANT CHANGE UP (ref 5–17)
BUN SERPL-MCNC: 9 MG/DL — SIGNIFICANT CHANGE UP (ref 8–20)
CALCIUM SERPL-MCNC: 8.5 MG/DL — LOW (ref 8.6–10.2)
CHLORIDE SERPL-SCNC: 96 MMOL/L — LOW (ref 98–107)
CO2 SERPL-SCNC: 22 MMOL/L — SIGNIFICANT CHANGE UP (ref 22–29)
CREAT SERPL-MCNC: 0.61 MG/DL — SIGNIFICANT CHANGE UP (ref 0.5–1.3)
GLUCOSE SERPL-MCNC: 350 MG/DL — HIGH (ref 70–115)
HCT VFR BLD CALC: 34.7 % — LOW (ref 37–47)
HGB BLD-MCNC: 12.1 G/DL — SIGNIFICANT CHANGE UP (ref 12–16)
MCHC RBC-ENTMCNC: 28.3 PG — SIGNIFICANT CHANGE UP (ref 27–31)
MCHC RBC-ENTMCNC: 34.9 G/DL — SIGNIFICANT CHANGE UP (ref 32–36)
MCV RBC AUTO: 81.3 FL — SIGNIFICANT CHANGE UP (ref 81–99)
PLATELET # BLD AUTO: 286 K/UL — SIGNIFICANT CHANGE UP (ref 150–400)
POTASSIUM SERPL-MCNC: 3.1 MMOL/L — LOW (ref 3.5–5.3)
POTASSIUM SERPL-SCNC: 3.1 MMOL/L — LOW (ref 3.5–5.3)
RBC # BLD: 4.27 M/UL — LOW (ref 4.4–5.2)
RBC # FLD: 14.1 % — SIGNIFICANT CHANGE UP (ref 11–15.6)
SODIUM SERPL-SCNC: 135 MMOL/L — SIGNIFICANT CHANGE UP (ref 135–145)
WBC # BLD: 8.5 K/UL — SIGNIFICANT CHANGE UP (ref 4.8–10.8)
WBC # FLD AUTO: 8.5 K/UL — SIGNIFICANT CHANGE UP (ref 4.8–10.8)

## 2017-06-28 PROCEDURE — 99232 SBSQ HOSP IP/OBS MODERATE 35: CPT

## 2017-06-28 PROCEDURE — 78708 K FLOW/FUNCT IMAGE W/DRUG: CPT | Mod: 26

## 2017-06-28 RX ORDER — SODIUM CHLORIDE 9 MG/ML
700 INJECTION INTRAMUSCULAR; INTRAVENOUS; SUBCUTANEOUS ONCE
Qty: 0 | Refills: 0 | Status: COMPLETED | OUTPATIENT
Start: 2017-06-28 | End: 2017-06-28

## 2017-06-28 RX ORDER — FUROSEMIDE 40 MG
40 TABLET ORAL ONCE
Qty: 0 | Refills: 0 | Status: COMPLETED | OUTPATIENT
Start: 2017-06-28 | End: 2017-06-28

## 2017-06-28 RX ORDER — CIPROFLOXACIN LACTATE 400MG/40ML
500 VIAL (ML) INTRAVENOUS EVERY 12 HOURS
Qty: 0 | Refills: 0 | Status: DISCONTINUED | OUTPATIENT
Start: 2017-06-28 | End: 2017-06-29

## 2017-06-28 RX ORDER — INSULIN GLARGINE 100 [IU]/ML
15 INJECTION, SOLUTION SUBCUTANEOUS AT BEDTIME
Qty: 0 | Refills: 0 | Status: DISCONTINUED | OUTPATIENT
Start: 2017-06-28 | End: 2017-06-29

## 2017-06-28 RX ORDER — POTASSIUM CHLORIDE 20 MEQ
40 PACKET (EA) ORAL ONCE
Qty: 0 | Refills: 0 | Status: COMPLETED | OUTPATIENT
Start: 2017-06-28 | End: 2017-06-28

## 2017-06-28 RX ADMIN — Medication 40 MILLIGRAM(S): at 12:00

## 2017-06-28 RX ADMIN — SODIUM CHLORIDE 700 MILLILITER(S): 9 INJECTION INTRAMUSCULAR; INTRAVENOUS; SUBCUTANEOUS at 09:50

## 2017-06-28 RX ADMIN — Medication: at 23:14

## 2017-06-28 RX ADMIN — AMLODIPINE BESYLATE 5 MILLIGRAM(S): 2.5 TABLET ORAL at 04:49

## 2017-06-28 RX ADMIN — SODIUM CHLORIDE 150 MILLILITER(S): 9 INJECTION INTRAMUSCULAR; INTRAVENOUS; SUBCUTANEOUS at 04:49

## 2017-06-28 RX ADMIN — Medication 6: at 08:26

## 2017-06-28 RX ADMIN — Medication 12: at 18:05

## 2017-06-28 RX ADMIN — ENOXAPARIN SODIUM 40 MILLIGRAM(S): 100 INJECTION SUBCUTANEOUS at 13:47

## 2017-06-28 RX ADMIN — FAMOTIDINE 20 MILLIGRAM(S): 10 INJECTION INTRAVENOUS at 18:05

## 2017-06-28 RX ADMIN — NORTRIPTYLINE HYDROCHLORIDE 25 MILLIGRAM(S): 10 CAPSULE ORAL at 13:47

## 2017-06-28 RX ADMIN — SODIUM CHLORIDE 150 MILLILITER(S): 9 INJECTION INTRAMUSCULAR; INTRAVENOUS; SUBCUTANEOUS at 08:26

## 2017-06-28 RX ADMIN — Medication 500 MILLIGRAM(S): at 23:04

## 2017-06-28 RX ADMIN — SODIUM CHLORIDE 150 MILLILITER(S): 9 INJECTION INTRAMUSCULAR; INTRAVENOUS; SUBCUTANEOUS at 18:05

## 2017-06-28 RX ADMIN — Medication 1 MILLIGRAM(S): at 13:47

## 2017-06-28 RX ADMIN — Medication 200 MILLIGRAM(S): at 04:48

## 2017-06-28 RX ADMIN — INSULIN GLARGINE 15 UNIT(S): 100 INJECTION, SOLUTION SUBCUTANEOUS at 23:03

## 2017-06-28 RX ADMIN — Medication 40 MILLIEQUIVALENT(S): at 23:04

## 2017-06-28 RX ADMIN — Medication 200 MILLIGRAM(S): at 18:05

## 2017-06-28 RX ADMIN — FAMOTIDINE 20 MILLIGRAM(S): 10 INJECTION INTRAVENOUS at 04:49

## 2017-06-28 NOTE — ADVANCED PRACTICE NURSE CONSULT - RECOMMEDATIONS
continue diabetes self managmeent education  pt  to draw and inject all insulin doses while in the hospital using insulin syringe and rn supervision  family to bring pt glucometer and insulin to the hospital  start lantus 15 units qhs

## 2017-06-28 NOTE — PROGRESS NOTE ADULT - ATTENDING COMMENTS
from [facility], May discharge to home
patient and relatives  verbalized understanding of  her condition and management by the  team,
patient improving.  patient and relatives , aware of  management plan, steps by step, verbalize understanding of the care plan.
relatives verbalized understanding  pathology and plan of care.

## 2017-06-28 NOTE — PROGRESS NOTE ADULT - SUBJECTIVE AND OBJECTIVE BOX
[facility]  INFECTIOUS DISEASES AND INTERNAL MEDICINE OF [place] [facility]  =======================================================  Angelo Lazcano MD  Diplomates American Board of Internal Medicine and Infectious Diseases  =======================================================    DESTIN CASH [id number]  chief complaint: Right pyelo    patient seen and examined in follow up.  Chart and labs reviewed.     cultures negative  fevers resolved    =======================================================  Allergies:  No Known Allergies      =======================================================  MEDICATIONS  (STANDING):  nortriptyline 25 milliGRAM(s) Oral daily  famotidine    Tablet 20 milliGRAM(s) Oral two times a day  folic acid 1 milliGRAM(s) Oral daily  LORazepam   Injectable 1 milliGRAM(s) IV Push once  enoxaparin Injectable 40 milliGRAM(s) SubCutaneous daily  sodium chloride 0.9%. 1000 milliLiter(s) (150 mL/Hr) IV Continuous <Continuous>  amLODIPine   Tablet 5 milliGRAM(s) Oral daily  dextrose 5%. 1000 milliLiter(s) (50 mL/Hr) IV Continuous <Continuous>  dextrose 50% Injectable 12.5 Gram(s) IV Push once  dextrose 50% Injectable 25 Gram(s) IV Push once  dextrose 50% Injectable 25 Gram(s) IV Push once  ciprofloxacin   IVPB 400 milliGRAM(s) IV Intermittent every 12 hours  ciprofloxacin   IVPB   IV Intermittent   insulin lispro (HumaLOG) corrective regimen sliding scale   SubCutaneous Before meals and at bedtime     =======================================================     REVIEW OF SYSTEMS:  CONSTITUTIONAL:  No Fever or chills  HEENT:   No diplopia or blurred vision.  No earache, sore throat or runny nose.  CARDIOVASCULAR:  No pressure, squeezing, strangling, tightness, heaviness or aching about the chest, neck, axilla or epigastrium.  RESPIRATORY:  No cough, shortness of breath, PND or orthopnea.  GASTROINTESTINAL:  No nausea, vomiting or diarrhea.  GENITOURINARY:  No dysuria, frequency or urgency. No Blood in urine  MUSCULOSKELETAL:  no joint aches, no muscle pain  SKIN:  No change in skin, hair or nails.  NEUROLOGIC:  No paresthesias, fasciculations, seizures or weakness.  PSYCHIATRIC:  No disorder of thought or mood.  ENDOCRINE:  No heat or cold intolerance, polyuria or polydipsia.  HEMATOLOGICAL:  No easy bruising or bleeding.     =======================================================     Physical Exam:  Vital Signs Last 24 Hrs  T(C): 36.8 (28 Jun 2017 07:55), Max: 37.3 (27 Jun 2017 22:10)  T(F): 98.3 (28 Jun 2017 07:55), Max: 99.1 (27 Jun 2017 22:10)  HR: 86 (28 Jun 2017 07:55) (84 - 86)  BP: 131/74 (28 Jun 2017 07:55) (131/74 - 150/74)  BP(mean): --  RR: 16 (28 Jun 2017 07:55) (16 - 16)  SpO2: 97% (28 Jun 2017 04:34) (96% - 97%)    GEN: NAD, pleasant  HEENT: normocephalic and atraumatic. EOMI. MACHO.    NECK: Supple. No carotid bruits.  No lymphadenopathy or thyromegaly.  LUNGS: Clear to auscultation.  HEART: Regular rate and rhythm without murmur.  ABDOMEN: Soft, nontender, and nondistended.  Positive bowel sounds.    : RIGHT CVA tenderness; MILD  EXTREMITIES: Without any cyanosis, clubbing, rash, lesions or edema.  MSK: no joint swelling  NEUROLOGIC: Cranial nerves II through XII are grossly intact.  PSYCHIATRIC: Appropriate affect .  SKIN: No ulceration or induration present.      =======================================================    Labs:  06-28    135  |  96<L>  |  9.0  ----------------------------<  350<H>  3.1<L>   |  22.0  |  0.61    Ca    8.5<L>      28 Jun 2017 06:03                            12.1   8.5   )-----------( 286      ( 28 Jun 2017 06:03 )             34.7                 CAPILLARY BLOOD GLUCOSE  284 (28 Jun 2017 08:24)  247 (27 Jun 2017 22:10)  217 (27 Jun 2017 16:51)  359 (27 Jun 2017 12:10)            RECENT CULTURES:            Culture - Urine (06.25.17 @ 11:00)    Specimen Source: .Urine Catheterized    Culture Results:   No growth

## 2017-06-28 NOTE — PROGRESS NOTE ADULT - SUBJECTIVE AND OBJECTIVE BOX
INTERVAL HPI/OVERNIGHT EVENTS:    MEDICATIONS  (STANDING):  nortriptyline 25 milliGRAM(s) Oral daily  famotidine    Tablet 20 milliGRAM(s) Oral two times a day  folic acid 1 milliGRAM(s) Oral daily  LORazepam   Injectable 1 milliGRAM(s) IV Push once  enoxaparin Injectable 40 milliGRAM(s) SubCutaneous daily  sodium chloride 0.9%. 1000 milliLiter(s) (150 mL/Hr) IV Continuous <Continuous>  amLODIPine   Tablet 5 milliGRAM(s) Oral daily  dextrose 5%. 1000 milliLiter(s) (50 mL/Hr) IV Continuous <Continuous>  dextrose 50% Injectable 12.5 Gram(s) IV Push once  dextrose 50% Injectable 25 Gram(s) IV Push once  dextrose 50% Injectable 25 Gram(s) IV Push once  ciprofloxacin   IVPB 400 milliGRAM(s) IV Intermittent every 12 hours  ciprofloxacin   IVPB   IV Intermittent   insulin lispro (HumaLOG) corrective regimen sliding scale   SubCutaneous Before meals and at bedtime    MEDICATIONS  (PRN):  ondansetron Injectable 4 milliGRAM(s) IV Push every 6 hours PRN Nausea and/or Vomiting  dextrose Gel 1 Dose(s) Oral once PRN Blood Glucose LESS THAN 70 milliGRAM(s)/deciliter  glucagon  Injectable 1 milliGRAM(s) IntraMuscular once PRN Glucose LESS THAN 70 milligrams/deciliter  morphine  - Injectable 2 milliGRAM(s) IV Push every 4 hours PRN Moderate Pain (4 - 6)      Allergies    No Known Allergies    Intolerances        Vital Signs Last 24 Hrs  T(C): 3 (28 Jun 2017 04:34), Max: 37.3 (27 Jun 2017 07:48)  T(F): 37.4 (28 Jun 2017 04:34), Max: 99.1 (27 Jun 2017 07:48)  HR: 86 (28 Jun 2017 04:34) (84 - 86)  BP: 150/74 (28 Jun 2017 04:34) (143/75 - 150/74)  BP(mean): --  RR: 16 (28 Jun 2017 04:34) (16 - 18)  SpO2: 97% (28 Jun 2017 04:34) (96% - 97%)     ON PE:  General: alert and awake  Abdomen: soft ND/NT   : To have Sierra placed for renal scan. Bladder no distended     LABS:                        12.1   8.5   )-----------( 286      ( 28 Jun 2017 06:03 )             34.7     06-28    135  |  96<L>  |  9.0  ----------------------------<  350<H>  3.1<L>   |  22.0  |  0.61    Ca    8.5<L>      28 Jun 2017 06:03            RADIOLOGY & ADDITIONAL TESTS:

## 2017-06-28 NOTE — PROGRESS NOTE ADULT - ASSESSMENT
clinically improving, white cells count improved, clinically better , requesting feeding.  no signs of sepsis  24 hours, after Cipro, will follow advise of   ID.  will await CBC and lytes today.  will continue IV hydration , if tolerates feeding will DC

## 2017-06-28 NOTE — PROGRESS NOTE ADULT - SUBJECTIVE AND OBJECTIVE BOX
HPI:  67 y/o female came to ER for one day duration of rt. flank pain, sharp, 8 out of 10 on pain scale.+ n/ v. + chills. no diarrhea. denies urine sympt. no cough. no cp. pt. denies any h/o renal stones. pt. got pain meds and iv fluids and feels better. no other complaints at this point. (24 Jun 2017 06:38)    Female  PAST MEDICAL & SURGICAL HISTORY:  HTN (hypertension)  DM (diabetes mellitus)  History of benign brain tumor      REVIEW OF SYSTEMS      General:	obesity    Skin/Breast:    negative   	  Ophthalmologic:  diabetic retinopathy  	  ENMT:	  normal      Respiratory and Thorax:  negative   	  Cardiovascular:	  hypertension     Gastrointestinal:	  negative     Genitourinary:	negative     Musculoskeletal:  obesity	    Neurological:	s/p brain biopsy, benign lesion.    Psychiatric:	negative     Hematology/Lymphatics:	negative     Endocrine:	obesity, diabetes type 2.    Allergic/Immunologic:	NKA    MEDICATIONS  (STANDING):  nortriptyline 25 milliGRAM(s) Oral daily  famotidine    Tablet 20 milliGRAM(s) Oral two times a day  folic acid 1 milliGRAM(s) Oral daily  LORazepam   Injectable 1 milliGRAM(s) IV Push once  enoxaparin Injectable 40 milliGRAM(s) SubCutaneous daily  sodium chloride 0.9%. 1000 milliLiter(s) (150 mL/Hr) IV Continuous <Continuous>  amLODIPine   Tablet 5 milliGRAM(s) Oral daily  dextrose 5%. 1000 milliLiter(s) (50 mL/Hr) IV Continuous <Continuous>  dextrose 50% Injectable 12.5 Gram(s) IV Push once  dextrose 50% Injectable 25 Gram(s) IV Push once  dextrose 50% Injectable 25 Gram(s) IV Push once  ciprofloxacin   IVPB 400 milliGRAM(s) IV Intermittent every 12 hours  ciprofloxacin   IVPB   IV Intermittent   insulin lispro (HumaLOG) corrective regimen sliding scale   SubCutaneous Before meals and at bedtime    MEDICATIONS  (PRN):  ondansetron Injectable 4 milliGRAM(s) IV Push every 6 hours PRN Nausea and/or Vomiting  dextrose Gel 1 Dose(s) Oral once PRN Blood Glucose LESS THAN 70 milliGRAM(s)/deciliter  glucagon  Injectable 1 milliGRAM(s) IntraMuscular once PRN Glucose LESS THAN 70 milligrams/deciliter  morphine  - Injectable 2 milliGRAM(s) IV Push every 4 hours PRN Moderate Pain (4 - 6)      Allergies    No Known Allergies    Intolerances        SOCIAL HISTORY:    FAMILY HISTORY:  Family history of diabetes mellitus      Vital Signs Last 24 Hrs  T(C): 3 (28 Jun 2017 04:34), Max: 37.3 (27 Jun 2017 07:48)  T(F): 37.4 (28 Jun 2017 04:34), Max: 99.1 (27 Jun 2017 07:48)  HR: 86 (28 Jun 2017 04:34) (84 - 86)  BP: 150/74 (28 Jun 2017 04:34) (143/75 - 150/74)  BP(mean): --  RR: 16 (28 Jun 2017 04:34) (16 - 18)  SpO2: 97% (28 Jun 2017 04:34) (96% - 97%)    PHYSICAL EXAM:      Constitutional:  well nourished,  obese,  awake alert oriented, no nausea, no vomiting, states no pain, no fever, no chills, no sign of sepsis.    Eyes:  refraction disorder    ENMT:  negative     Neck:  supple,     Breasts: negative     Back: normal.    Respiratory:   negative     Cardiovascular:  normal sinus rhythm, no murmur    Gastrointestinal:  normal     Genitourinary:      Rectal:    Extremities: no edema.  no Homans sign    Vascular:  normal pulses    Neurological:  no focal, normal     Skin:  normal     Lymph Nodes:  normal     Musculoskeletal:  normal     Psychiatric:  normal         LABS:  CBC Full  -  ( 27 Jun 2017 06:35 )  WBC Count : 11.9 K/uL  Hemoglobin : 12.0 g/dL  Hematocrit : 34.4 %  Platelet Count - Automated : 252 K/uL  Mean Cell Volume : 81.7 fl  Mean Cell Hemoglobin : 28.5 pg  Mean Cell Hemoglobin Concentration : 34.9 g/dL  Auto Neutrophil # : x  Auto Lymphocyte # : x  Auto Monocyte # : x  Auto Eosinophil # : x  Auto Basophil # : x  Auto Neutrophil % : x  Auto Lymphocyte % : x  Auto Monocyte % : x  Auto Eosinophil % : x  Auto Basophil % : x    06-27    136  |  98  |  9.0  ----------------------------<  263<H>  3.0<L>   |  24.0  |  0.61    Ca    8.6      27 Jun 2017 06:35      blood culture negative 24  and 48 hours.      RADIOLOGY & ADDITIONAL STUDIES:

## 2017-06-28 NOTE — ADVANCED PRACTICE NURSE CONSULT - ASSESSMENT
pt is a+ox3 c/o 0 pain resting comfortably in bed. rn is assisting w language. pt has diabetes over 10 years she takes insulin 20 units 2xdaily. she has a glucometer but is nt consistent w bg monitoring. she had too many parties and she ate too much carbohydrate. pt agree to ask family to bring her insulin and glucometer to the hospital and to draw and inject all insulin doses while in the hospital using insulin syringe and rn supervision

## 2017-06-29 VITALS
OXYGEN SATURATION: 98 % | RESPIRATION RATE: 16 BRPM | DIASTOLIC BLOOD PRESSURE: 74 MMHG | HEART RATE: 84 BPM | SYSTOLIC BLOOD PRESSURE: 132 MMHG | TEMPERATURE: 98 F

## 2017-06-29 LAB
ANION GAP SERPL CALC-SCNC: 15 MMOL/L — SIGNIFICANT CHANGE UP (ref 5–17)
BUN SERPL-MCNC: 14 MG/DL — SIGNIFICANT CHANGE UP (ref 8–20)
CALCIUM SERPL-MCNC: 8.8 MG/DL — SIGNIFICANT CHANGE UP (ref 8.6–10.2)
CHLORIDE SERPL-SCNC: 98 MMOL/L — SIGNIFICANT CHANGE UP (ref 98–107)
CO2 SERPL-SCNC: 23 MMOL/L — SIGNIFICANT CHANGE UP (ref 22–29)
CREAT SERPL-MCNC: 0.73 MG/DL — SIGNIFICANT CHANGE UP (ref 0.5–1.3)
GLUCOSE SERPL-MCNC: 328 MG/DL — HIGH (ref 70–115)
POTASSIUM SERPL-MCNC: 3.8 MMOL/L — SIGNIFICANT CHANGE UP (ref 3.5–5.3)
POTASSIUM SERPL-SCNC: 3.8 MMOL/L — SIGNIFICANT CHANGE UP (ref 3.5–5.3)
SODIUM SERPL-SCNC: 136 MMOL/L — SIGNIFICANT CHANGE UP (ref 135–145)

## 2017-06-29 PROCEDURE — 36000 PLACE NEEDLE IN VEIN: CPT

## 2017-06-29 RX ORDER — INSULIN LISPRO 100/ML
0 VIAL (ML) SUBCUTANEOUS
Qty: 0 | Refills: 0 | COMMUNITY
Start: 2017-06-29

## 2017-06-29 RX ORDER — INSULIN GLARGINE 100 [IU]/ML
20 INJECTION, SOLUTION SUBCUTANEOUS AT BEDTIME
Qty: 0 | Refills: 0 | Status: DISCONTINUED | OUTPATIENT
Start: 2017-06-29 | End: 2017-06-29

## 2017-06-29 RX ORDER — NORTRIPTYLINE HYDROCHLORIDE 10 MG/1
1 CAPSULE ORAL
Qty: 0 | Refills: 0 | COMMUNITY

## 2017-06-29 RX ORDER — AMLODIPINE BESYLATE 2.5 MG/1
1 TABLET ORAL
Qty: 0 | Refills: 0 | COMMUNITY

## 2017-06-29 RX ORDER — INSULIN GLARGINE 100 [IU]/ML
20 INJECTION, SOLUTION SUBCUTANEOUS
Qty: 0 | Refills: 0 | COMMUNITY
Start: 2017-06-29

## 2017-06-29 RX ORDER — CIPROFLOXACIN LACTATE 400MG/40ML
1 VIAL (ML) INTRAVENOUS
Qty: 0 | Refills: 0 | COMMUNITY
Start: 2017-06-29

## 2017-06-29 RX ADMIN — Medication 500 MILLIGRAM(S): at 08:17

## 2017-06-29 RX ADMIN — Medication 8: at 12:54

## 2017-06-29 RX ADMIN — ENOXAPARIN SODIUM 40 MILLIGRAM(S): 100 INJECTION SUBCUTANEOUS at 12:53

## 2017-06-29 RX ADMIN — Medication 500 MILLIGRAM(S): at 17:29

## 2017-06-29 RX ADMIN — Medication 1 MILLIGRAM(S): at 12:54

## 2017-06-29 RX ADMIN — Medication 8: at 17:43

## 2017-06-29 RX ADMIN — AMLODIPINE BESYLATE 5 MILLIGRAM(S): 2.5 TABLET ORAL at 08:17

## 2017-06-29 RX ADMIN — Medication 8: at 08:17

## 2017-06-29 RX ADMIN — FAMOTIDINE 20 MILLIGRAM(S): 10 INJECTION INTRAVENOUS at 08:17

## 2017-06-29 RX ADMIN — FAMOTIDINE 20 MILLIGRAM(S): 10 INJECTION INTRAVENOUS at 17:29

## 2017-06-29 NOTE — DISCHARGE NOTE ADULT - CARE PLAN
Principal Discharge DX:	Pyelonephritis  Goal:	prevent holding urination,  any urinary symtoms , consult in my office ASAP  Instructions for follow-up, activity and diet:	follow up  in 10 days after, completion of  antibiotic .  close glucose monitoring, visit office if no improvement.  Secondary Diagnosis:	DM (diabetes mellitus)  Goal:	prevent  uncontrolled diabetes  Secondary Diagnosis:	Hypokalemia  Goal:	dietary potasium supplementation in the AM, orange juice

## 2017-06-29 NOTE — DISCHARGE NOTE ADULT - MEDICATION SUMMARY - MEDICATIONS TO STOP TAKING
I will STOP taking the medications listed below when I get home from the hospital:    nortriptyline 25 mg oral capsule  -- 1 tab(s) by mouth once a day

## 2017-06-29 NOTE — DISCHARGE NOTE ADULT - MEDICATION SUMMARY - MEDICATIONS TO TAKE
I will START or STAY ON the medications listed below when I get home from the hospital:    glyBURIDE  --  by mouth   -- Indication: For metabolic agent    insulin glargine  -- 20  subcutaneous once a day (at bedtime)  -- Indication: For metabolic agent    insulin lispro 100 units/mL subcutaneous solution  --  subcutaneous 4 times a day (before meals and at bedtime); 2 Unit(s) if Glucose 151 - 200  4 Unit(s) if Glucose 201 - 250  6 Unit(s) if Glucose 251 - 300  8 Unit(s) if Glucose 301 - 350  10 Unit(s) if Glucose 351 - 400  12 Unit(s) if Glucose Greater Than 400  -- Indication: For metabolic agent, for diabetes managent     hydroCHLOROthiazide 25 mg oral tablet  -- 1 tab(s) by mouth once a day  -- Indication: For cardiovacular agent     famotidine 20 mg oral tablet  -- 1 tab(s) by mouth 2 times a day  -- Indication: For gastrointestinal agent    ciprofloxacin 500 mg oral tablet  -- 1 tab(s) by mouth every 12 hours  -- Indication: For Anti infective     folic acid 1 mg oral tablet  -- 1 tab(s) by mouth once a day  -- Indication: For Vitamin, nutrional product

## 2017-06-29 NOTE — DISCHARGE NOTE ADULT - PLAN OF CARE
prevent holding urination,  any urinary symtoms , consult in my office ASAP follow up  in 10 days after, completion of  antibiotic .  close glucose monitoring, visit office if no improvement. prevent  uncontrolled diabetes dietary potasium supplementation in the AM, orange juice

## 2017-06-29 NOTE — PROGRESS NOTE ADULT - SUBJECTIVE AND OBJECTIVE BOX
INTERVAL HPI/OVERNIGHT EVENTS: Patient feels much better. Apparently she is being discharge today.    MEDICATIONS  (STANDING):  famotidine    Tablet 20 milliGRAM(s) Oral two times a day  folic acid 1 milliGRAM(s) Oral daily  LORazepam   Injectable 1 milliGRAM(s) IV Push once  enoxaparin Injectable 40 milliGRAM(s) SubCutaneous daily  amLODIPine   Tablet 5 milliGRAM(s) Oral daily  dextrose 5%. 1000 milliLiter(s) (50 mL/Hr) IV Continuous <Continuous>  dextrose 50% Injectable 12.5 Gram(s) IV Push once  dextrose 50% Injectable 25 Gram(s) IV Push once  dextrose 50% Injectable 25 Gram(s) IV Push once  insulin lispro (HumaLOG) corrective regimen sliding scale   SubCutaneous Before meals and at bedtime  ciprofloxacin     Tablet 500 milliGRAM(s) Oral every 12 hours  insulin glargine Injectable (LANTUS) 15 Unit(s) SubCutaneous at bedtime    MEDICATIONS  (PRN):  ondansetron Injectable 4 milliGRAM(s) IV Push every 6 hours PRN Nausea and/or Vomiting  dextrose Gel 1 Dose(s) Oral once PRN Blood Glucose LESS THAN 70 milliGRAM(s)/deciliter  glucagon  Injectable 1 milliGRAM(s) IntraMuscular once PRN Glucose LESS THAN 70 milligrams/deciliter  morphine  - Injectable 2 milliGRAM(s) IV Push every 4 hours PRN Moderate Pain (4 - 6)      Allergies    No Known Allergies    Intolerances        Vital Signs Last 24 Hrs  T(C): 36.8 (29 Jun 2017 07:51), Max: 37.7 (29 Jun 2017 05:32)  T(F): 98.2 (29 Jun 2017 07:51), Max: 99.8 (29 Jun 2017 05:32)  HR: 82 (29 Jun 2017 07:51) (81 - 89)  BP: 140/79 (29 Jun 2017 07:51) (136/75 - 167/87)  BP(mean): --  RR: 16 (29 Jun 2017 07:51) (16 - 18)  SpO2: 99% (29 Jun 2017 05:32) (98% - 99%)    ROS:  Constitutional: No fever, weight loss or fatigue  Respiratory: No cough, wheezing, chills or hemoptysis  Cardiovascular: No chest pain, palpitations, shortness of breath, dizziness or leg swelling  Genitourinary: No dysuria, frequency, hematuria or incontinence  Skin: No itching, burning, rashes or lesions   Musculoskeletal: No joint pain or swelling; No muscle, back or extremity pain  Psychiatric: No depression, anxiety, mood swings or difficulty sleeping  Allergy and Immunologic: No hives or eczema     ON PE:  General: Well developed; well nourished; in no acute distress  Head: Normocephalic; atraumatic  Respiratory: No tachypnea  Gastrointestinal: Soft non-tender non-distended;  Genitourinary: No costovertebral angle tenderness.  Urinary bladder is clinically not distended  Extremities: Normal range of motion, No edema  Neurological: Alert and oriented x4  Skin: Warm and dry. No acute rash  Psychiatric: Cooperative and appropriate      LABS:                        12.1   8.5   )-----------( 286      ( 28 Jun 2017 06:03 )             34.7     06-29    136  |  98  |  14.0  ----------------------------<  328<H>  3.8   |  23.0  |  0.73    Ca    8.8      29 Jun 2017 06:30            RADIOLOGY & ADDITIONAL TESTS:

## 2017-06-29 NOTE — PROGRESS NOTE ADULT - PROBLEM SELECTOR PROBLEM 1
Pyelonephritis
Pyelonephritis
Acute urinary tract infection
Other hydronephrosis
Other hydronephrosis
Pyelonephritis

## 2017-06-29 NOTE — DISCHARGE NOTE ADULT - PATIENT PORTAL LINK FT
“You can access the [facility] Patient Portal, offered by [facility], by registering with the following website: [email or web address]”

## 2017-06-29 NOTE — DISCHARGE NOTE ADULT - HOSPITAL COURSE
patient admitted secondary to severe Right Flank pain,  imaging showed pyelonephritis,  with dilated pyelocaliceal system, no obstruction  , no stones, possible status post stone passage.  white cells increased, and placed on  empiric antibiotic, added another empiric antibiotic in 48 hours no response .  ID consult was called , and re addressed, plan of care.  Urologist called and suggested a kidney scan, which  showed no obstruction  bladder scan showed no obstruction.    white cell counts, improve and  changed to oral antibiotic, good response  Glucose uncontrolled, added  Lantus insulin,  and sliding scale coverage.  will  discharge patient today, stable, no pain and no sign of sepsis.

## 2017-06-29 NOTE — PROGRESS NOTE ADULT - PROBLEM SELECTOR PROBLEM 2
DM (diabetes mellitus)
Other hydronephrosis
DM (diabetes mellitus)
Flank pain, acute
Other hydronephrosis

## 2017-06-29 NOTE — DISCHARGE NOTE ADULT - CARE PROVIDER_API CALL
Avila Mayo), Family Medicine  [address] [place] [address]  Phone: [phone]  Fax: [phone]

## 2017-06-29 NOTE — ADVANCED PRACTICE NURSE CONSULT - ASSESSMENT
return to see pt in am pt is a+ox3 speaks [place]ian. family brought pts glucometer and syringes but no insulin. discussed w son to bring insulin

## 2017-06-29 NOTE — PROGRESS NOTE ADULT - PROBLEM SELECTOR PLAN 2
Renal scan demonstrates equilateral function and no evidence of obstruction.  Will see prn.
needs good diabetic control per primary team
needs good diabetic control per primary team
obstructive uropathy,   possible passed stone, no history of nephrolithiasis.  will consult urologist
pain addressed
will continue  insulin coverage
will continue insuline coverage and  IV isotonic solution.

## 2017-06-29 NOTE — ADVANCED PRACTICE NURSE CONSULT - RECOMMEDATIONS
continue diabetes self mangement education  pt to draw and inject all insulin doses while in the hospital using inuslin syringe and rn supervision  increase lantus to 20 units qhs

## 2017-06-29 NOTE — PROGRESS NOTE ADULT - PROBLEM SELECTOR PLAN 1
- cultures negative; WBC NORMAL now  - continue  CIPRO 400mg IV Q12H  - MAY CHANGE to oral regimen for home
Clinically improved.  Abx as per ID
- cultures negative  - will STOP ertapenem today and de-escalate to CIPRO 400mg IV Q12H; repeat CBC in AM and trend WBC/ fever curve  - if patient remains stable in next 24- 48 h, will suggest to change to oral regimen for home
1.  iv abx  2.  dialy cbc  3.  Renal scan to see if the patient is functionally obstructed.
Renal nuclear scan pendindg
clinically better on  IV AB  pain persist, less intense
possible pyelonephritis, right hydronephrosis, rule out bacteremia.  will continue  Levaquin, Rocephin  will consult ID   will  consult urologist
will continue present plan IV Cipro
will monitor, urine infection, AB on board, cultures sent

## 2017-06-30 LAB
CULTURE RESULTS: SIGNIFICANT CHANGE UP
CULTURE RESULTS: SIGNIFICANT CHANGE UP
SPECIMEN SOURCE: SIGNIFICANT CHANGE UP
SPECIMEN SOURCE: SIGNIFICANT CHANGE UP

## 2017-07-01 ENCOUNTER — EMERGENCY (EMERGENCY)
Facility: HOSPITAL | Age: 67
LOS: 1 days | Discharge: HOME | End: 2017-07-01
Attending: EMERGENCY MEDICINE
Payer: MEDICARE

## 2017-07-01 VITALS
WEIGHT: 149.91 LBS | RESPIRATION RATE: 18 BRPM | TEMPERATURE: 98 F | HEART RATE: 100 BPM | DIASTOLIC BLOOD PRESSURE: 79 MMHG | SYSTOLIC BLOOD PRESSURE: 136 MMHG | HEIGHT: 60 IN | OXYGEN SATURATION: 98 %

## 2017-07-01 VITALS
DIASTOLIC BLOOD PRESSURE: 80 MMHG | OXYGEN SATURATION: 99 % | TEMPERATURE: 98 F | RESPIRATION RATE: 16 BRPM | HEART RATE: 88 BPM | SYSTOLIC BLOOD PRESSURE: 128 MMHG

## 2017-07-01 DIAGNOSIS — Z86.011 PERSONAL HISTORY OF BENIGN NEOPLASM OF THE BRAIN: Chronic | ICD-10-CM

## 2017-07-01 PROBLEM — I10 ESSENTIAL (PRIMARY) HYPERTENSION: Chronic | Status: ACTIVE | Noted: 2017-06-24

## 2017-07-01 PROBLEM — E11.9 TYPE 2 DIABETES MELLITUS WITHOUT COMPLICATIONS: Chronic | Status: ACTIVE | Noted: 2017-06-24

## 2017-07-01 LAB
ACETONE SERPL-MCNC: NEGATIVE — SIGNIFICANT CHANGE UP
ALBUMIN SERPL ELPH-MCNC: 3.9 G/DL — SIGNIFICANT CHANGE UP (ref 3.3–5.2)
ALP SERPL-CCNC: 93 U/L — SIGNIFICANT CHANGE UP (ref 40–120)
ALT FLD-CCNC: 19 U/L — SIGNIFICANT CHANGE UP
ANION GAP SERPL CALC-SCNC: 15 MMOL/L — SIGNIFICANT CHANGE UP (ref 5–17)
APPEARANCE UR: CLEAR — SIGNIFICANT CHANGE UP
AST SERPL-CCNC: 15 U/L — SIGNIFICANT CHANGE UP
BASE EXCESS BLDV CALC-SCNC: -1.9 MMOL/L — SIGNIFICANT CHANGE UP (ref -3–3)
BASOPHILS # BLD AUTO: 0 K/UL — SIGNIFICANT CHANGE UP (ref 0–0.2)
BASOPHILS NFR BLD AUTO: 0 % — SIGNIFICANT CHANGE UP (ref 0–2)
BILIRUB SERPL-MCNC: 0.4 MG/DL — SIGNIFICANT CHANGE UP (ref 0.4–2)
BILIRUB UR-MCNC: NEGATIVE — SIGNIFICANT CHANGE UP
BLOOD GAS COMMENTS, VENOUS: SIGNIFICANT CHANGE UP
BUN SERPL-MCNC: 29 MG/DL — HIGH (ref 8–20)
CALCIUM SERPL-MCNC: 9.6 MG/DL — SIGNIFICANT CHANGE UP (ref 8.6–10.2)
CHLORIDE SERPL-SCNC: 94 MMOL/L — LOW (ref 98–107)
CO2 SERPL-SCNC: 20 MMOL/L — LOW (ref 22–29)
COLOR SPEC: YELLOW — SIGNIFICANT CHANGE UP
CREAT SERPL-MCNC: 1.14 MG/DL — SIGNIFICANT CHANGE UP (ref 0.5–1.3)
DIFF PNL FLD: NEGATIVE — SIGNIFICANT CHANGE UP
EOSINOPHIL # BLD AUTO: 0.2 K/UL — SIGNIFICANT CHANGE UP (ref 0–0.5)
EOSINOPHIL NFR BLD AUTO: 0 % — SIGNIFICANT CHANGE UP (ref 0–5)
GAS PNL BLDV: SIGNIFICANT CHANGE UP
GLUCOSE SERPL-MCNC: 532 MG/DL — CRITICAL HIGH (ref 70–115)
GLUCOSE UR QL: 250 MG/DL
HCO3 BLDV-SCNC: 23 MMOL/L — SIGNIFICANT CHANGE UP (ref 20–26)
HCT VFR BLD CALC: 35.3 % — LOW (ref 37–47)
HGB BLD-MCNC: 12.6 G/DL — SIGNIFICANT CHANGE UP (ref 12–16)
HOROWITZ INDEX BLDV+IHG-RTO: SIGNIFICANT CHANGE UP
KETONES UR-MCNC: NEGATIVE — SIGNIFICANT CHANGE UP
LEUKOCYTE ESTERASE UR-ACNC: ABNORMAL
LIDOCAIN IGE QN: 107 U/L — HIGH (ref 22–51)
LYMPHOCYTES # BLD AUTO: 18 % — LOW (ref 20–55)
LYMPHOCYTES # BLD AUTO: 2.1 K/UL — SIGNIFICANT CHANGE UP (ref 1–4.8)
MCHC RBC-ENTMCNC: 28.8 PG — SIGNIFICANT CHANGE UP (ref 27–31)
MCHC RBC-ENTMCNC: 35.7 G/DL — SIGNIFICANT CHANGE UP (ref 32–36)
MCV RBC AUTO: 80.6 FL — LOW (ref 81–99)
MONOCYTES # BLD AUTO: 0.8 K/UL — SIGNIFICANT CHANGE UP (ref 0–0.8)
MONOCYTES NFR BLD AUTO: 10 % — SIGNIFICANT CHANGE UP (ref 3–10)
NEUTROPHILS # BLD AUTO: 5.8 K/UL — SIGNIFICANT CHANGE UP (ref 1.8–8)
NEUTROPHILS NFR BLD AUTO: 66 % — SIGNIFICANT CHANGE UP (ref 37–73)
NITRITE UR-MCNC: NEGATIVE — SIGNIFICANT CHANGE UP
PCO2 BLDV: 45 MMHG — SIGNIFICANT CHANGE UP (ref 35–50)
PH BLDV: 7.34 — LOW (ref 7.35–7.45)
PH UR: 6.5 — SIGNIFICANT CHANGE UP (ref 5–8)
PLATELET # BLD AUTO: 473 K/UL — HIGH (ref 150–400)
PO2 BLDV: 69 MMHG — HIGH (ref 25–45)
POTASSIUM SERPL-MCNC: 3.7 MMOL/L — SIGNIFICANT CHANGE UP (ref 3.5–5.3)
POTASSIUM SERPL-SCNC: 3.7 MMOL/L — SIGNIFICANT CHANGE UP (ref 3.5–5.3)
PROT SERPL-MCNC: 7.5 G/DL — SIGNIFICANT CHANGE UP (ref 6.6–8.7)
PROT UR-MCNC: NEGATIVE MG/DL — SIGNIFICANT CHANGE UP
RBC # BLD: 4.38 M/UL — LOW (ref 4.4–5.2)
RBC # FLD: 14 % — SIGNIFICANT CHANGE UP (ref 11–15.6)
SAO2 % BLDV: 93 % — HIGH (ref 67–88)
SODIUM SERPL-SCNC: 129 MMOL/L — LOW (ref 135–145)
SP GR SPEC: 1 — LOW (ref 1.01–1.02)
UROBILINOGEN FLD QL: NEGATIVE MG/DL — SIGNIFICANT CHANGE UP
WBC # BLD: 9.6 K/UL — SIGNIFICANT CHANGE UP (ref 4.8–10.8)
WBC # FLD AUTO: 9.6 K/UL — SIGNIFICANT CHANGE UP (ref 4.8–10.8)

## 2017-07-01 PROCEDURE — T1013: CPT

## 2017-07-01 PROCEDURE — 85027 COMPLETE CBC AUTOMATED: CPT

## 2017-07-01 PROCEDURE — 82803 BLOOD GASES ANY COMBINATION: CPT

## 2017-07-01 PROCEDURE — 99284 EMERGENCY DEPT VISIT MOD MDM: CPT

## 2017-07-01 PROCEDURE — 99284 EMERGENCY DEPT VISIT MOD MDM: CPT | Mod: 25

## 2017-07-01 PROCEDURE — 82009 KETONE BODYS QUAL: CPT

## 2017-07-01 PROCEDURE — 83690 ASSAY OF LIPASE: CPT

## 2017-07-01 PROCEDURE — 36415 COLL VENOUS BLD VENIPUNCTURE: CPT

## 2017-07-01 PROCEDURE — 81001 URINALYSIS AUTO W/SCOPE: CPT

## 2017-07-01 PROCEDURE — 96374 THER/PROPH/DIAG INJ IV PUSH: CPT

## 2017-07-01 PROCEDURE — 80053 COMPREHEN METABOLIC PANEL: CPT

## 2017-07-01 RX ORDER — INSULIN HUMAN 100 [IU]/ML
8 INJECTION, SOLUTION SUBCUTANEOUS ONCE
Qty: 0 | Refills: 0 | Status: COMPLETED | OUTPATIENT
Start: 2017-07-01 | End: 2017-07-01

## 2017-07-01 RX ORDER — SODIUM CHLORIDE 9 MG/ML
2000 INJECTION INTRAMUSCULAR; INTRAVENOUS; SUBCUTANEOUS ONCE
Qty: 0 | Refills: 0 | Status: COMPLETED | OUTPATIENT
Start: 2017-07-01 | End: 2017-07-01

## 2017-07-01 RX ADMIN — INSULIN HUMAN 8 UNIT(S): 100 INJECTION, SOLUTION SUBCUTANEOUS at 15:50

## 2017-07-01 RX ADMIN — SODIUM CHLORIDE 2000 MILLILITER(S): 9 INJECTION INTRAMUSCULAR; INTRAVENOUS; SUBCUTANEOUS at 14:33

## 2017-07-01 NOTE — ED ADULT NURSE NOTE - OBJECTIVE STATEMENT
pt presents to ED with hyperglycemia sent by PMD. pt c.o ,ild headache. denies  dizziness. denies n/v. pt states her PMD recently changed her dose of insulin  a few days ago. breathing si even and unlabored. a&ox3 PERRL. maex4 lungs cta denies chest pain/sob. abd soft nt nd +BS denies n/v/d, skin w.d.i. will continue to monitor and reassess

## 2017-07-01 NOTE — ED PROVIDER NOTE - MEDICAL DECISION MAKING DETAILS
Hyperglycemia discharged 2 days ago from hospital.  R/O DKA- will check labs including acetone and VBG.

## 2017-07-01 NOTE — ED PROVIDER NOTE - OBJECTIVE STATEMENT
This patient is a 66 year old woman with a past medical history of DM who was sent to the ER by her PMD Dr. Mayo for hyperglycemia.  Patient was discharged 2 days ago for hyperglycemia with novolog and lantus.  Patient states that she has been taking her medications as instructed.  She has been experiencing intermittent dizziness.  She denies abdominal pain, nausea, and vomiting.

## 2017-10-11 PROCEDURE — 93005 ELECTROCARDIOGRAM TRACING: CPT

## 2017-10-11 PROCEDURE — 99285 EMERGENCY DEPT VISIT HI MDM: CPT | Mod: 25

## 2017-10-11 PROCEDURE — 87040 BLOOD CULTURE FOR BACTERIA: CPT

## 2017-10-11 PROCEDURE — 96376 TX/PRO/DX INJ SAME DRUG ADON: CPT

## 2017-10-11 PROCEDURE — 96375 TX/PRO/DX INJ NEW DRUG ADDON: CPT

## 2017-10-11 PROCEDURE — 81001 URINALYSIS AUTO W/SCOPE: CPT

## 2017-10-11 PROCEDURE — 83036 HEMOGLOBIN GLYCOSYLATED A1C: CPT

## 2017-10-11 PROCEDURE — 78708 K FLOW/FUNCT IMAGE W/DRUG: CPT

## 2017-10-11 PROCEDURE — 74176 CT ABD & PELVIS W/O CONTRAST: CPT

## 2017-10-11 PROCEDURE — 85730 THROMBOPLASTIN TIME PARTIAL: CPT

## 2017-10-11 PROCEDURE — 85027 COMPLETE CBC AUTOMATED: CPT

## 2017-10-11 PROCEDURE — 80048 BASIC METABOLIC PNL TOTAL CA: CPT

## 2017-10-11 PROCEDURE — 87086 URINE CULTURE/COLONY COUNT: CPT

## 2017-10-11 PROCEDURE — 85610 PROTHROMBIN TIME: CPT

## 2017-10-11 PROCEDURE — T1013: CPT

## 2017-10-11 PROCEDURE — 80053 COMPREHEN METABOLIC PANEL: CPT

## 2017-10-11 PROCEDURE — A9562: CPT

## 2017-10-11 PROCEDURE — 71045 X-RAY EXAM CHEST 1 VIEW: CPT

## 2017-10-11 PROCEDURE — 96374 THER/PROPH/DIAG INJ IV PUSH: CPT

## 2017-10-11 PROCEDURE — 36415 COLL VENOUS BLD VENIPUNCTURE: CPT

## 2018-04-03 NOTE — ED ADULT NURSE NOTE - NS ED NURSE RECORD ANOTHER VITAL SIGN
Immediate Post OP Note    PreOp Diagnosis: R elbow contracture; R cts, R ulnar nerve compression    PostOp Diagnosis: same    Procedure(s):  NERVE ULNAR TRANSFER - TRANSPOSITION  CARPAL TUNNEL RELEASE  ELBOW ARTHROTOMY - FOR CONTRACTURE RELEASE AT THE ELBOW, RADIAL HEAD EXCISION    Surgeon(s):  Ayad Luna M.D.    Anesthesiologist/Type of Anesthesia:  Anesthesiologist: David Almendarez M.D./* No anesthesia type entered *    Surgical Staff:  Circulator: Cheryle Wyatt R.N.  Scrub Person: Felix Miller    Specimens removed if any:  * No specimens in log *    Estimated Blood Loss: min    Findings: adequate release    Complications: none        4/3/2018 12:57 PM Ayad Luna M.D.       Yes

## 2018-05-22 ENCOUNTER — EMERGENCY (EMERGENCY)
Facility: HOSPITAL | Age: 68
LOS: 1 days | Discharge: HOME | End: 2018-05-22
Attending: EMERGENCY MEDICINE | Admitting: EMERGENCY MEDICINE
Payer: MEDICARE

## 2018-05-22 VITALS — WEIGHT: 160.06 LBS | HEIGHT: 60 IN

## 2018-05-22 VITALS
HEART RATE: 89 BPM | TEMPERATURE: 98 F | RESPIRATION RATE: 18 BRPM | SYSTOLIC BLOOD PRESSURE: 148 MMHG | DIASTOLIC BLOOD PRESSURE: 84 MMHG | OXYGEN SATURATION: 97 %

## 2018-05-22 DIAGNOSIS — Z86.011 PERSONAL HISTORY OF BENIGN NEOPLASM OF THE BRAIN: Chronic | ICD-10-CM

## 2018-05-22 LAB
ACETONE SERPL-MCNC: NEGATIVE — SIGNIFICANT CHANGE UP
ALBUMIN SERPL ELPH-MCNC: 4.2 G/DL — SIGNIFICANT CHANGE UP (ref 3.3–5.2)
ALP SERPL-CCNC: 76 U/L — SIGNIFICANT CHANGE UP (ref 40–120)
ALT FLD-CCNC: 33 U/L — HIGH
ANION GAP SERPL CALC-SCNC: 14 MMOL/L — SIGNIFICANT CHANGE UP (ref 5–17)
APPEARANCE UR: CLEAR — SIGNIFICANT CHANGE UP
AST SERPL-CCNC: 27 U/L — SIGNIFICANT CHANGE UP
BASOPHILS # BLD AUTO: 0 K/UL — SIGNIFICANT CHANGE UP (ref 0–0.2)
BASOPHILS NFR BLD AUTO: 0.1 % — SIGNIFICANT CHANGE UP (ref 0–2)
BILIRUB SERPL-MCNC: 0.5 MG/DL — SIGNIFICANT CHANGE UP (ref 0.4–2)
BILIRUB UR-MCNC: NEGATIVE — SIGNIFICANT CHANGE UP
BUN SERPL-MCNC: 20 MG/DL — SIGNIFICANT CHANGE UP (ref 8–20)
CALCIUM SERPL-MCNC: 9.2 MG/DL — SIGNIFICANT CHANGE UP (ref 8.6–10.2)
CHLORIDE SERPL-SCNC: 95 MMOL/L — LOW (ref 98–107)
CO2 SERPL-SCNC: 27 MMOL/L — SIGNIFICANT CHANGE UP (ref 22–29)
COLOR SPEC: YELLOW — SIGNIFICANT CHANGE UP
CREAT SERPL-MCNC: 0.73 MG/DL — SIGNIFICANT CHANGE UP (ref 0.5–1.3)
DIFF PNL FLD: NEGATIVE — SIGNIFICANT CHANGE UP
EOSINOPHIL # BLD AUTO: 0.2 K/UL — SIGNIFICANT CHANGE UP (ref 0–0.5)
EOSINOPHIL NFR BLD AUTO: 1.1 % — SIGNIFICANT CHANGE UP (ref 0–6)
GLUCOSE SERPL-MCNC: 287 MG/DL — HIGH (ref 70–115)
GLUCOSE UR QL: 1000 MG/DL
HCT VFR BLD CALC: 39.1 % — SIGNIFICANT CHANGE UP (ref 37–47)
HGB BLD-MCNC: 13.5 G/DL — SIGNIFICANT CHANGE UP (ref 12–16)
KETONES UR-MCNC: NEGATIVE — SIGNIFICANT CHANGE UP
LEUKOCYTE ESTERASE UR-ACNC: NEGATIVE — SIGNIFICANT CHANGE UP
LYMPHOCYTES # BLD AUTO: 18.7 % — LOW (ref 20–55)
LYMPHOCYTES # BLD AUTO: 2.8 K/UL — SIGNIFICANT CHANGE UP (ref 1–4.8)
MCHC RBC-ENTMCNC: 28.2 PG — SIGNIFICANT CHANGE UP (ref 27–31)
MCHC RBC-ENTMCNC: 34.5 G/DL — SIGNIFICANT CHANGE UP (ref 32–36)
MCV RBC AUTO: 81.6 FL — SIGNIFICANT CHANGE UP (ref 81–99)
MONOCYTES # BLD AUTO: 0.7 K/UL — SIGNIFICANT CHANGE UP (ref 0–0.8)
MONOCYTES NFR BLD AUTO: 4.4 % — SIGNIFICANT CHANGE UP (ref 3–10)
NEUTROPHILS # BLD AUTO: 11.4 K/UL — HIGH (ref 1.8–8)
NEUTROPHILS NFR BLD AUTO: 75.4 % — HIGH (ref 37–73)
NITRITE UR-MCNC: NEGATIVE — SIGNIFICANT CHANGE UP
PH UR: 7 — SIGNIFICANT CHANGE UP (ref 5–8)
PLATELET # BLD AUTO: 366 K/UL — SIGNIFICANT CHANGE UP (ref 150–400)
POTASSIUM SERPL-MCNC: 3.5 MMOL/L — SIGNIFICANT CHANGE UP (ref 3.5–5.3)
POTASSIUM SERPL-SCNC: 3.5 MMOL/L — SIGNIFICANT CHANGE UP (ref 3.5–5.3)
PROT SERPL-MCNC: 7.6 G/DL — SIGNIFICANT CHANGE UP (ref 6.6–8.7)
PROT UR-MCNC: NEGATIVE MG/DL — SIGNIFICANT CHANGE UP
RBC # BLD: 4.79 M/UL — SIGNIFICANT CHANGE UP (ref 4.4–5.2)
RBC # FLD: 12.9 % — SIGNIFICANT CHANGE UP (ref 11–15.6)
SODIUM SERPL-SCNC: 136 MMOL/L — SIGNIFICANT CHANGE UP (ref 135–145)
SP GR SPEC: 1 — LOW (ref 1.01–1.02)
UROBILINOGEN FLD QL: NEGATIVE MG/DL — SIGNIFICANT CHANGE UP
WBC # BLD: 15.1 K/UL — HIGH (ref 4.8–10.8)
WBC # FLD AUTO: 15.1 K/UL — HIGH (ref 4.8–10.8)

## 2018-05-22 PROCEDURE — T1013: CPT

## 2018-05-22 PROCEDURE — 96374 THER/PROPH/DIAG INJ IV PUSH: CPT

## 2018-05-22 PROCEDURE — 82962 GLUCOSE BLOOD TEST: CPT

## 2018-05-22 PROCEDURE — 99284 EMERGENCY DEPT VISIT MOD MDM: CPT

## 2018-05-22 PROCEDURE — 80053 COMPREHEN METABOLIC PANEL: CPT

## 2018-05-22 PROCEDURE — 81003 URINALYSIS AUTO W/O SCOPE: CPT

## 2018-05-22 PROCEDURE — 99284 EMERGENCY DEPT VISIT MOD MDM: CPT | Mod: 25

## 2018-05-22 PROCEDURE — 85027 COMPLETE CBC AUTOMATED: CPT

## 2018-05-22 PROCEDURE — 82009 KETONE BODYS QUAL: CPT

## 2018-05-22 PROCEDURE — 36415 COLL VENOUS BLD VENIPUNCTURE: CPT

## 2018-05-22 RX ORDER — SODIUM CHLORIDE 9 MG/ML
2000 INJECTION INTRAMUSCULAR; INTRAVENOUS; SUBCUTANEOUS ONCE
Qty: 0 | Refills: 0 | Status: COMPLETED | OUTPATIENT
Start: 2018-05-22 | End: 2018-05-22

## 2018-05-22 RX ORDER — ONDANSETRON 8 MG/1
4 TABLET, FILM COATED ORAL ONCE
Qty: 0 | Refills: 0 | Status: COMPLETED | OUTPATIENT
Start: 2018-05-22 | End: 2018-05-22

## 2018-05-22 RX ADMIN — SODIUM CHLORIDE 2000 MILLILITER(S): 9 INJECTION INTRAMUSCULAR; INTRAVENOUS; SUBCUTANEOUS at 12:40

## 2018-05-22 RX ADMIN — ONDANSETRON 4 MILLIGRAM(S): 8 TABLET, FILM COATED ORAL at 12:40

## 2018-05-22 NOTE — ED PROVIDER NOTE - PROGRESS NOTE DETAILS
NP NOTE:   Labs reviewed, acetone negative, blood sugar 287.  Patient states that she feels better after 2L NS.  Will d/c home with f/u Dr. Mayo.

## 2018-05-22 NOTE — ED PROVIDER NOTE - ATTENDING CONTRIBUTION TO CARE
I, Sofia Abel, independently evaluated the patient. The APN made the initial evaluation and discussed history, physical and plan with me and I agree. I examined the patient noting no tachycardia, clear lungs and a soft, non-tender abdomen, and discussed lab results and continuing her current medications. I discussed indications to return to the ED and the importance of proper follow up with PMD. Patient verbalizes understanding and is comfortable with discharge at this time.

## 2018-08-28 ENCOUNTER — EMERGENCY (EMERGENCY)
Facility: HOSPITAL | Age: 68
LOS: 1 days | Discharge: HOME | End: 2018-08-28
Attending: EMERGENCY MEDICINE
Payer: COMMERCIAL

## 2018-08-28 VITALS
HEART RATE: 81 BPM | RESPIRATION RATE: 18 BRPM | SYSTOLIC BLOOD PRESSURE: 137 MMHG | OXYGEN SATURATION: 95 % | TEMPERATURE: 99 F | DIASTOLIC BLOOD PRESSURE: 74 MMHG

## 2018-08-28 VITALS — WEIGHT: 179.9 LBS | HEIGHT: 67 IN

## 2018-08-28 DIAGNOSIS — Z86.011 PERSONAL HISTORY OF BENIGN NEOPLASM OF THE BRAIN: Chronic | ICD-10-CM

## 2018-08-28 PROCEDURE — 99284 EMERGENCY DEPT VISIT MOD MDM: CPT | Mod: GC

## 2018-08-28 PROCEDURE — 70450 CT HEAD/BRAIN W/O DYE: CPT | Mod: 26

## 2018-08-28 NOTE — ED ADULT NURSE NOTE - NSIMPLEMENTINTERV_GEN_ALL_ED
Implemented All Universal Safety Interventions:  [place] to call system. Call bell, personal items and telephone within reach. Instruct patient to call for assistance. Room bathroom lighting operational. Non-slip footwear when patient is off stretcher. Physically safe environment: no spills, clutter or unnecessary equipment. Stretcher in lowest position, wheels locked, appropriate side rails in place.

## 2018-08-28 NOTE — ED STATDOCS - CPE ED SKIN NORM
Message from [place]:  Original authorizing provider: Armin Cain MD, MD Gtz SHELDON Nataly Abdi Barriga MD]  
normal...

## 2018-08-28 NOTE — ED ADULT NURSE NOTE - NS ED NURSE RECORD ANOTHER VITAL SIGN
Procedure Note


 Section Procedure


Performed by


Ramona Rg


Procedure:  Primary Low Transverse  Sec


Indication for delivery:  Nonreassuring fetal heart tracing


Previous condition:  None


Informed consent obtained:  For anesthesia, For procedure


Confirmed correct:  Patient, Procedure, Site, Time-out taken


Anesthesia:  Epidural


Medication prior to procedure:  As documented in eMAR


Monitoring during procedure:  Blood pressure monitoring, Cardiac monitor, Fetal 

monitor, Pulse oximetry


Urinary catheter:  Inserted using sterile technique, To dependent drainage, ml 

urine output (100cc, blood tinged prior to OR)


Sterile preparation:  In usual fashion, Other (Chloraprep)


Position:  Supine with wedge to left side





Operative Features


Skin Incision:  Pfannenstiel


Uterine Incision:  Low transverse w/knife / blunt ext


Membranes Ruptured:  Previously


Presentation:  Occiput anterior


Delivery date:  Mar 20, 2016


Delivery time:  10:41


Delivery of infant:  Uneventful


Infant:  Male


One Minute APGAR:  8


Five Minute APGAR:  9


Birth Weight:  8#5oz


Status of infant:  Viable, Cord blood


Placenta delivered:  Intact, Other (Short umbilical cord)


Estimated blood loss:  800


Procedure tolerated:  Well


Maternal Condition:  Stable


Baby Complications:  Other (Assistance needed with transition)


Fetal Condition:  Stable


Procedure in detail


See dictation











Ramona Rg MD Mar 20, 2018 20:49 Yes
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
## 2018-08-28 NOTE — ED STATDOCS - PROGRESS NOTE DETAILS
PA NOTE: Pt seen by intake physician and hpi/orders/plan reviewed, and confirmed  PLAN: PT with stable VS, no acute distress, non toxic appearing, tolerating PO in the ED, Josh vasc intact no REd flags for back pain PT staets that she is feeling better pt will be dc home with follow up to PCP and spinal clinic educated about when to return to the ED if needed. PT verbalizes that he understands all instructions and results.

## 2018-08-28 NOTE — ED STATDOCS - ATTENDING CONTRIBUTION TO CARE
seen with resident, and subsequently signed over to acp:    I personally saw the patient with the resident, and completed the key components of the history and physical exam. I then discussed the management plan with the resident.     I, Dieter Whitt, performed the initial face to face bedside interview with this patient regarding history of present illness, review of symptoms and relevant past medical, social and family history.  I completed an independent physical examination.  I was the initial provider who evaluated this patient. I have signed out the follow up of any pending tests (i.e. labs, radiological studies) to the ACP.  I have communicated the patient’s plan of care and disposition with the ACP.

## 2018-08-28 NOTE — ED STATDOCS - MEDICAL DECISION MAKING DETAILS
physical exam reassuring, however patient anxious and eager for confirmatory imaging; ct head, if negative, ok for d/c with OTC pain relief

## 2018-08-29 PROCEDURE — 99284 EMERGENCY DEPT VISIT MOD MDM: CPT | Mod: 25

## 2018-08-29 PROCEDURE — T1013: CPT

## 2018-08-29 PROCEDURE — 70450 CT HEAD/BRAIN W/O DYE: CPT

## 2018-09-01 ENCOUNTER — EMERGENCY (EMERGENCY)
Facility: HOSPITAL | Age: 68
LOS: 1 days | Discharge: HOME | End: 2018-09-01
Attending: EMERGENCY MEDICINE
Payer: MEDICARE

## 2018-09-01 VITALS
WEIGHT: 160.06 LBS | DIASTOLIC BLOOD PRESSURE: 77 MMHG | TEMPERATURE: 99 F | HEIGHT: 60 IN | RESPIRATION RATE: 18 BRPM | SYSTOLIC BLOOD PRESSURE: 138 MMHG | OXYGEN SATURATION: 98 % | HEART RATE: 84 BPM

## 2018-09-01 VITALS
OXYGEN SATURATION: 97 % | DIASTOLIC BLOOD PRESSURE: 74 MMHG | HEART RATE: 80 BPM | SYSTOLIC BLOOD PRESSURE: 132 MMHG | RESPIRATION RATE: 19 BRPM

## 2018-09-01 DIAGNOSIS — Z86.011 PERSONAL HISTORY OF BENIGN NEOPLASM OF THE BRAIN: Chronic | ICD-10-CM

## 2018-09-01 LAB
ALBUMIN SERPL ELPH-MCNC: 3.7 G/DL — SIGNIFICANT CHANGE UP (ref 3.3–5.2)
ALP SERPL-CCNC: 95 U/L — SIGNIFICANT CHANGE UP (ref 40–120)
ALT FLD-CCNC: 71 U/L — HIGH
ANION GAP SERPL CALC-SCNC: 14 MMOL/L — SIGNIFICANT CHANGE UP (ref 5–17)
APPEARANCE UR: CLEAR — SIGNIFICANT CHANGE UP
APTT BLD: 36.2 SEC — SIGNIFICANT CHANGE UP (ref 27.5–37.4)
AST SERPL-CCNC: 66 U/L — HIGH
BACTERIA # UR AUTO: ABNORMAL
BASOPHILS # BLD AUTO: 0 K/UL — SIGNIFICANT CHANGE UP (ref 0–0.2)
BASOPHILS NFR BLD AUTO: 0.1 % — SIGNIFICANT CHANGE UP (ref 0–2)
BILIRUB SERPL-MCNC: 0.6 MG/DL — SIGNIFICANT CHANGE UP (ref 0.4–2)
BILIRUB UR-MCNC: NEGATIVE — SIGNIFICANT CHANGE UP
BUN SERPL-MCNC: 27 MG/DL — HIGH (ref 8–20)
CALCIUM SERPL-MCNC: 9 MG/DL — SIGNIFICANT CHANGE UP (ref 8.6–10.2)
CHLORIDE SERPL-SCNC: 92 MMOL/L — LOW (ref 98–107)
CO2 SERPL-SCNC: 27 MMOL/L — SIGNIFICANT CHANGE UP (ref 22–29)
COLOR SPEC: YELLOW — SIGNIFICANT CHANGE UP
CREAT SERPL-MCNC: 0.94 MG/DL — SIGNIFICANT CHANGE UP (ref 0.5–1.3)
DIFF PNL FLD: ABNORMAL
EOSINOPHIL # BLD AUTO: 0 K/UL — SIGNIFICANT CHANGE UP (ref 0–0.5)
EOSINOPHIL NFR BLD AUTO: 0.1 % — SIGNIFICANT CHANGE UP (ref 0–6)
EPI CELLS # UR: SIGNIFICANT CHANGE UP
GLUCOSE SERPL-MCNC: 521 MG/DL — CRITICAL HIGH (ref 70–115)
GLUCOSE UR QL: 1000 MG/DL
HCT VFR BLD CALC: 38.5 % — SIGNIFICANT CHANGE UP (ref 37–47)
HGB BLD-MCNC: 12.8 G/DL — SIGNIFICANT CHANGE UP (ref 12–16)
INR BLD: 1.16 RATIO — SIGNIFICANT CHANGE UP (ref 0.88–1.16)
KETONES UR-MCNC: NEGATIVE — SIGNIFICANT CHANGE UP
LEUKOCYTE ESTERASE UR-ACNC: NEGATIVE — SIGNIFICANT CHANGE UP
LYMPHOCYTES # BLD AUTO: 0.8 K/UL — LOW (ref 1–4.8)
LYMPHOCYTES # BLD AUTO: 11.7 % — LOW (ref 20–55)
MAGNESIUM SERPL-MCNC: 2.5 MG/DL — SIGNIFICANT CHANGE UP (ref 1.6–2.6)
MCHC RBC-ENTMCNC: 27.8 PG — SIGNIFICANT CHANGE UP (ref 27–31)
MCHC RBC-ENTMCNC: 33.2 G/DL — SIGNIFICANT CHANGE UP (ref 32–36)
MCV RBC AUTO: 83.7 FL — SIGNIFICANT CHANGE UP (ref 81–99)
MONOCYTES # BLD AUTO: 0.6 K/UL — SIGNIFICANT CHANGE UP (ref 0–0.8)
MONOCYTES NFR BLD AUTO: 9.2 % — SIGNIFICANT CHANGE UP (ref 3–10)
NEUTROPHILS # BLD AUTO: 5.4 K/UL — SIGNIFICANT CHANGE UP (ref 1.8–8)
NEUTROPHILS NFR BLD AUTO: 78.2 % — HIGH (ref 37–73)
NITRITE UR-MCNC: NEGATIVE — SIGNIFICANT CHANGE UP
PH UR: 6.5 — SIGNIFICANT CHANGE UP (ref 5–8)
PLATELET # BLD AUTO: 238 K/UL — SIGNIFICANT CHANGE UP (ref 150–400)
POTASSIUM SERPL-MCNC: 3.5 MMOL/L — SIGNIFICANT CHANGE UP (ref 3.5–5.3)
POTASSIUM SERPL-SCNC: 3.5 MMOL/L — SIGNIFICANT CHANGE UP (ref 3.5–5.3)
PROT SERPL-MCNC: 7.2 G/DL — SIGNIFICANT CHANGE UP (ref 6.6–8.7)
PROT UR-MCNC: 30 MG/DL
PROTHROM AB SERPL-ACNC: 12.8 SEC — HIGH (ref 9.8–12.7)
RBC # BLD: 4.6 M/UL — SIGNIFICANT CHANGE UP (ref 4.4–5.2)
RBC # FLD: 13.6 % — SIGNIFICANT CHANGE UP (ref 11–15.6)
RBC CASTS # UR COMP ASSIST: ABNORMAL /HPF (ref 0–4)
SODIUM SERPL-SCNC: 133 MMOL/L — LOW (ref 135–145)
SP GR SPEC: 1 — LOW (ref 1.01–1.02)
UROBILINOGEN FLD QL: NEGATIVE MG/DL — SIGNIFICANT CHANGE UP
WBC # BLD: 7 K/UL — SIGNIFICANT CHANGE UP (ref 4.8–10.8)
WBC # FLD AUTO: 7 K/UL — SIGNIFICANT CHANGE UP (ref 4.8–10.8)
WBC UR QL: SIGNIFICANT CHANGE UP

## 2018-09-01 PROCEDURE — 74177 CT ABD & PELVIS W/CONTRAST: CPT | Mod: 26

## 2018-09-01 PROCEDURE — 85610 PROTHROMBIN TIME: CPT

## 2018-09-01 PROCEDURE — 74177 CT ABD & PELVIS W/CONTRAST: CPT

## 2018-09-01 PROCEDURE — 99284 EMERGENCY DEPT VISIT MOD MDM: CPT | Mod: 25

## 2018-09-01 PROCEDURE — 80053 COMPREHEN METABOLIC PANEL: CPT

## 2018-09-01 PROCEDURE — 96374 THER/PROPH/DIAG INJ IV PUSH: CPT | Mod: XU

## 2018-09-01 PROCEDURE — 36415 COLL VENOUS BLD VENIPUNCTURE: CPT

## 2018-09-01 PROCEDURE — T1013: CPT

## 2018-09-01 PROCEDURE — 87086 URINE CULTURE/COLONY COUNT: CPT

## 2018-09-01 PROCEDURE — 85027 COMPLETE CBC AUTOMATED: CPT

## 2018-09-01 PROCEDURE — 83735 ASSAY OF MAGNESIUM: CPT

## 2018-09-01 PROCEDURE — 85730 THROMBOPLASTIN TIME PARTIAL: CPT

## 2018-09-01 PROCEDURE — 82962 GLUCOSE BLOOD TEST: CPT

## 2018-09-01 PROCEDURE — 81001 URINALYSIS AUTO W/SCOPE: CPT

## 2018-09-01 PROCEDURE — 99284 EMERGENCY DEPT VISIT MOD MDM: CPT

## 2018-09-01 RX ORDER — INSULIN HUMAN 100 [IU]/ML
8 INJECTION, SOLUTION SUBCUTANEOUS ONCE
Qty: 0 | Refills: 0 | Status: COMPLETED | OUTPATIENT
Start: 2018-09-01 | End: 2018-09-01

## 2018-09-01 RX ORDER — SODIUM CHLORIDE 9 MG/ML
2000 INJECTION INTRAMUSCULAR; INTRAVENOUS; SUBCUTANEOUS ONCE
Qty: 0 | Refills: 0 | Status: COMPLETED | OUTPATIENT
Start: 2018-09-01 | End: 2018-09-01

## 2018-09-01 RX ADMIN — SODIUM CHLORIDE 2000 MILLILITER(S): 9 INJECTION INTRAMUSCULAR; INTRAVENOUS; SUBCUTANEOUS at 13:33

## 2018-09-01 RX ADMIN — INSULIN HUMAN 8 UNIT(S): 100 INJECTION, SOLUTION SUBCUTANEOUS at 15:33

## 2018-09-01 NOTE — ED PROVIDER NOTE - MUSCULOSKELETAL, MLM
(+) R lower back ttp. Spine appears normal, range of motion is not limited, no muscle or joint tenderness
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
## 2018-09-01 NOTE — ED PROVIDER NOTE - ENMT NEGATIVE STATEMENT, MLM
Ears: (+)Otitis media; no hearing problems. Nose: no nasal congestion and no nasal drainage.Mouth/Throat: no dysphagia, no hoarseness and no throat pain.Neck: no lumps, no pain, no stiffness and no swollen glands.
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
## 2018-09-01 NOTE — ED PROVIDER NOTE - PROGRESS NOTE DETAILS
Patient reassessed. She notes her abdominal pain improved and she wants to go home and eat. I discussed CT results and recommended follow up CT in 4-6 months.

## 2018-09-01 NOTE — ED ADULT NURSE NOTE - NSIMPLEMENTINTERV_GEN_ALL_ED
Implemented All Universal Safety Interventions:  [place] to call system. Call bell, personal items and telephone within reach. Instruct patient to call for assistance. Room bathroom lighting operational. Non-slip footwear when patient is off stretcher. Physically safe environment: no spills, clutter or unnecessary equipment. Stretcher in lowest position, wheels locked, appropriate side rails in place.
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
## 2018-09-03 LAB
CULTURE RESULTS: SIGNIFICANT CHANGE UP
SPECIMEN SOURCE: SIGNIFICANT CHANGE UP

## 2019-07-10 ENCOUNTER — APPOINTMENT (OUTPATIENT)
Dept: UROGYNECOLOGY | Facility: CLINIC | Age: 69
End: 2019-07-10
Payer: MEDICARE

## 2019-07-10 DIAGNOSIS — Z86.73 PERSONAL HISTORY OF TRANSIENT ISCHEMIC ATTACK (TIA), AND CEREBRAL INFARCTION W/OUT RESIDUAL DEFICITS: ICD-10-CM

## 2019-07-10 DIAGNOSIS — Z82.3 FAMILY HISTORY OF STROKE: ICD-10-CM

## 2019-07-10 DIAGNOSIS — Z83.3 FAMILY HISTORY OF DIABETES MELLITUS: ICD-10-CM

## 2019-07-10 DIAGNOSIS — Z83.79 FAMILY HISTORY OF OTHER DISEASES OF THE DIGESTIVE SYSTEM: ICD-10-CM

## 2019-07-10 DIAGNOSIS — Z86.018 PERSONAL HISTORY OF OTHER BENIGN NEOPLASM: ICD-10-CM

## 2019-07-10 DIAGNOSIS — E78.00 PURE HYPERCHOLESTEROLEMIA, UNSPECIFIED: ICD-10-CM

## 2019-07-10 DIAGNOSIS — Z78.9 OTHER SPECIFIED HEALTH STATUS: ICD-10-CM

## 2019-07-10 DIAGNOSIS — Z87.42 PERSONAL HISTORY OF OTHER DISEASES OF THE FEMALE GENITAL TRACT: ICD-10-CM

## 2019-07-10 LAB
BILIRUB UR QL STRIP: NEGATIVE
CLARITY UR: CLEAR
COLLECTION METHOD: NORMAL
GLUCOSE UR-MCNC: 500
HCG UR QL: 0.2 EU/DL
HGB UR QL STRIP.AUTO: NEGATIVE
KETONES UR-MCNC: NEGATIVE
LEUKOCYTE ESTERASE UR QL STRIP: NEGATIVE
NITRITE UR QL STRIP: NEGATIVE
PH UR STRIP: 5.5
PROT UR STRIP-MCNC: NEGATIVE
SP GR UR STRIP: 1.01

## 2019-07-10 PROCEDURE — 51701 INSERT BLADDER CATHETER: CPT

## 2019-07-10 PROCEDURE — 99204 OFFICE O/P NEW MOD 45 MIN: CPT | Mod: 25

## 2019-07-10 PROCEDURE — 81003 URINALYSIS AUTO W/O SCOPE: CPT | Mod: QW

## 2019-07-10 NOTE — CHIEF COMPLAINT
[Questionnaire Received] : Patient questionnaire received [Poor Bladder Control] : poor bladder control [Cannot Empty Bladder] : cannot empty bladder [Recurrent Urinary Infections] : recurrent urinary infections

## 2019-07-10 NOTE — PROCEDURE
[Straight Catheterization] : insertion of a straight catheter [Urinary Tract Infection] : a urinary tract infection [Stress Incontinence] : stress incontinence [Urgent Incontinence] : urgent incontinence [Urinary Frequency] : urinary frequency [Patient] : the patient [___ Fr Straight Tip] : a [unfilled] in Nigerien straight tip catheter [None] : there were no complications with the catheter insertion [Clear] : clear [No Complications] : no complications [Tolerated Well] : the patient tolerated the procedure well [Post procedure instructions and information given] : Post procedure instructions and information were given and reviewed with patient. [0] : 0 [FreeTextEntry1] : catheterized to obtain uncontaminated specimen

## 2019-07-10 NOTE — ASSESSMENT
[FreeTextEntry1] : Barb is a pleasant 69 yo  P2, PSHx includes ABI / ? BSO, presents with bothersome HALEIGH for years, and questionable recurrent UTIs x 2 years. On exam, her empty supine CST was neg, however she had positive urethral hypermobility. Her straight-cath PVR volume was normal and the dip was neg besides glucose - she should discuss DM control with her PMD. On pelvic exam, there were no appreciable masses or lesions. Pelvic floor muscle contraction strength was present but weak. She had atrophic vaginal changes, and her vaginal cuff was intact. POPQ exam did not demonstrate clinically significant pelvic organ prolapse.\par \par The etiology of OFELIA was discussed. Management options including observation, behavioral modifications, medication, pessary, Impressa insert, periurethral bulking via cystoscopy, and surgery with midurethral sling were reviewed. The etiology of OAB was discussed. Management options including observation, behavioral modifications (dietary changes, monitoring fluid intake, bladder training, timed voids, use of pads/protective garments), kegels, PT, medications, PTNS, SNS, and bladder Botox were all reviewed. She is more bothered by OAB-wet. She will try bladder training which was instructed, as well as kegels. She would like to start a medication - oxybutynin 10mg ER prescribed. SEs, R/B/A discussed, including risk of dry eyes blurry vision, dry mouth, constipation, and increased future risk of dementia if used chronically. She understood. The plan is to use it for 6 months if helping and tolerated, then wean down and off by 1 year. She understood and agreed. We also discussed weight loss through healthy diet and exercise for overall health and to improve urinary symptoms. All questions answered.\par \par Plan:\par [] bladder training\par [] oxybutynin 10mg ER - plan for 6 months then wean down\par [] weight loss\par [] DM control - discuss glucosuria with PMD

## 2019-07-10 NOTE — PHYSICAL EXAM
[No Acute Distress] : in no acute distress [Oriented x3] : oriented to person, place, and time [No Edema] : ~T edema was not present [Symmetrical] : the neck was ~L symmetrical [Soft, Nontender] : the abdomen was soft and nontender [None] : no CVA tenderness [Warm and Dry] : was warm and dry to touch [Normal Gait] : gait was normal [Labia Majora] : were normal [Labia Minora] : were normal [Bartholin's Gland] : both Bartholin's glands were normal  [Atrophy] : atrophy [No Bleeding] : there was no active vaginal bleeding [2] : 2 [Normal] : was normal [Aa ____] : Aa [unfilled] [Ba ____] : Ba [unfilled] [C ____] : C [unfilled] [GH ____] : GH [unfilled] [PB ____] : PB [unfilled] [TVL ____] : TVL  [unfilled] [Ap ____] : Ap [unfilled] [Bp ____] : Bp [unfilled] [] : I [Absent] : absent [Soft] :  the cervix was soft [Post Void Residual ____ml] : post void residual via catheterization was [unfilled] ml [Exam Deferred] : was deferred [Cough] : no cough [Tenderness] : ~T no ~M abdominal tenderness observed [Distended] : not distended [Inguinal LAD] : no adenopathy was noted in the inguinal lymph nodes [FreeTextEntry3] : +urethral hypermobility [FreeTextEntry4] : cuff intact [de-identified] : no palpable masses or fullness [de-identified] : CST neg; uroflow normal curve appearance but low volume void for proper interpretation

## 2019-07-10 NOTE — LETTER BODY
[Dear  ___] : Dear  [unfilled], [I had the pleasure of evaluating your patient, [unfilled]. Thank you for referring Ms. [unfilled] for consultation for ___] : I had the pleasure of evaluating your patient, [unfilled]. Thank you for referring Ms. [unfilled] for consultation for [unfilled]. [Thank you very much for allowing me to participate in the care of this patient. If you have any questions, please do not hesitate to contact me] : Thank you very much for allowing me to participate in the care of this patient. If you have any questions, please do not hesitate to contact me. [Attached please find my note.] : Attached please find my note.

## 2019-07-10 NOTE — REASON FOR VISIT
[[employer] ] : provided by [employer]   [FreeTextEntry1] : [id number] [FreeTextEntry2] : Carlota

## 2019-07-10 NOTE — OB HISTORY
[Sexually Active] : sexually active [Vaginal ___] : [unfilled] vaginal delivery(s) [FreeTextEntry1] : largest baby 9 lbs 12 oz

## 2019-07-10 NOTE — HISTORY OF PRESENT ILLNESS
[FreeTextEntry1] : Leakage of urine with cough sneeze laugh and activity, as well as with urgency. +pads, get wet. Daytime freq, urge, nocturia 4. No dysuria, no gross hematuria, no flank pain. Reports recurrent UTIs, dysuria but unclear if UCx-proven, x 2 years. No bulge or pressure from vagina. +SA, , no VB. No pain. Normal bowel movements. Reports avg daily fluid intake only regular water 50 oz, 8 oz coffee.\par \par Hx ABI/BSO.\par \par DM, HTN, prior stroke, high chol, on a diuretic.\par \par recent lab work Cr 1.0.

## 2019-07-19 NOTE — ED PROVIDER NOTE - OBJECTIVE STATEMENT
68 y/o F presents with c/o "high sugar for a week".  Patient was switched from NPH to Levemir last week by Dr. Mayo for insurance reasons.   She has been having elevated sugars every time she checks it, has been thirsty and urinating more than normal.   Has not called PCP with these complaints.  Denies HA, dizziness, vomited x 1 last night. 1 month s/p , epigastric pain radiating to back - iv, r/o pe, eval biliary colic, pain control, reassess

## 2019-08-09 NOTE — ED ADULT TRIAGE NOTE - NS ED NURSE BANDS TYPE
Assessment/Plan:  No problem-specific Assessment & Plan notes found for this encounter  Diagnoses and all orders for this visit:    Other depression  Comments:  Slight improvement  I am going to increase Wellbutrin to 300 mg 1 tablet daily  It was discussed about side effects  He refuses counseling  Orders:  -     buPROPion (WELLBUTRIN XL) 300 mg 24 hr tablet; Take 1 tablet (300 mg total) by mouth daily for 30 days    Essential hypertension  Comments: Well controlled  Continue same  Will continue to monitor  Other hyperlipidemia  Comments:  Fair controlled  Continue same  Will continue to monitor  It was discussed about low-fat diet    Prediabetes  Comments: It was discussed about low carb diet  We will continue to monitor A1c  ST elevation myocardial infarction (STEMI), unspecified artery ([facility] [address] )  Comments:  Asymptomatic  Continue same  Will continue to monitor  Continue to follow up with cardiologist           There are no Patient Instructions on file for this visit  Return in about 6 weeks (around 9/20/2019)  Subjective:      Patient ID: Yulissa Armando is a 63 y o  male  Chief Complaint   Patient presents with    Depression     one month f/u    Hypertension    Hyperlipidemia     review b/w results       He is here today for follow-up multiple medical problems  He has blood work done recently he has elevated A1c 6  All the rest of the blood work came back stable  He has been following with urologist for his hematuria  He also sees his cardiologist for his coronary artery disease status post stent  He was started on Wellbutrin for his depression  He stated it is improving but he continues to feel depressed  He denies any suicidal or homicidal thoughts  He denies any side effects from the medication        The following portions of the patient's history were reviewed and updated as appropriate: allergies, current medications, past family history, past medical history, past social history, past surgical history and problem list     Review of Systems   Constitutional: Negative for chills and fever  HENT: Negative for trouble swallowing  Eyes: Negative for visual disturbance  Respiratory: Negative for cough and shortness of breath  Cardiovascular: Negative for chest pain and palpitations  Gastrointestinal: Negative for abdominal pain, blood in stool and vomiting  Endocrine: Negative for cold intolerance and heat intolerance  Genitourinary: Negative for difficulty urinating and dysuria  Musculoskeletal: Negative for gait problem  Skin: Negative for rash  Neurological: Negative for dizziness, syncope and headaches  Hematological: Negative for adenopathy  Psychiatric/Behavioral: Positive for dysphoric mood  Negative for behavioral problems  Current Outpatient Medications   Medication Sig Dispense Refill    aspirin 81 MG tablet every 24 hours      atorvastatin (LIPITOR) 80 mg tablet every 24 hours      buPROPion (WELLBUTRIN XL) 300 mg 24 hr tablet Take 1 tablet (300 mg total) by mouth daily for 30 days 30 tablet 0    clopidogrel (PLAVIX) 75 mg tablet every 24 hours      losartan (COZAAR) 50 mg tablet TAKE 1 TABLET BY MOUTH EVERY DAY (Patient taking differently: 100 mg ) 30 tablet 0    metoprolol succinate (TOPROL-XL) 100 mg 24 hr tablet Take 100 mg by mouth daily  5    nitroglycerin (NITROSTAT) 0 4 mg SL tablet place 1 tablet by sublingual route at the 1st sign of attack; may repeat every 5 min until relief; if pain persists after 3 tablets in 15 min, prompt medical attention is recommended      nicotine (NICODERM CQ) 21 mg/24 hr TD 24 hr patch every 24 hours       No current facility-administered medications for this visit          Objective:    /80   Pulse 60   Temp 98 2 °F (36 8 °C) (Tympanic)   Resp 16   Ht 6' (1 829 m)   Wt 118 kg (260 lb 8 oz)   SpO2 97%   BMI 35 33 kg/m²        Physical Exam   Constitutional: He is oriented to person, place, and time  He appears well-developed and well-nourished  HENT:   Head: Normocephalic and atraumatic  Eyes: Pupils are equal, round, and reactive to light  EOM are normal    Neck: Normal range of motion  Neck supple  Cardiovascular: Normal rate, regular rhythm and normal heart sounds  Pulmonary/Chest: Effort normal and breath sounds normal    Abdominal: Soft  Bowel sounds are normal    Musculoskeletal: Normal range of motion  He exhibits no edema  Lymphadenopathy:     He has no cervical adenopathy  Neurological: He is alert and oriented to person, place, and time  No cranial nerve deficit  Skin: Skin is warm  No rash noted  Psychiatric: He has a normal mood and affect  Nursing note and vitals reviewed               Marcelina Murrell MD Name band;

## 2019-08-21 ENCOUNTER — APPOINTMENT (OUTPATIENT)
Dept: UROGYNECOLOGY | Facility: CLINIC | Age: 69
End: 2019-08-21
Payer: MEDICARE

## 2019-08-21 VITALS — SYSTOLIC BLOOD PRESSURE: 145 MMHG | DIASTOLIC BLOOD PRESSURE: 83 MMHG

## 2019-08-21 LAB
BILIRUB UR QL STRIP: NEGATIVE
CLARITY UR: CLEAR
COLLECTION METHOD: NORMAL
GLUCOSE UR-MCNC: >=1000
HCG UR QL: 0.2 EU/DL
HGB UR QL STRIP.AUTO: NEGATIVE
KETONES UR-MCNC: NEGATIVE
LEUKOCYTE ESTERASE UR QL STRIP: NORMAL
NITRITE UR QL STRIP: NEGATIVE
PH UR STRIP: 7
PROT UR STRIP-MCNC: NEGATIVE
SP GR UR STRIP: 1.01

## 2019-08-21 PROCEDURE — 51701 INSERT BLADDER CATHETER: CPT

## 2019-08-21 PROCEDURE — 81003 URINALYSIS AUTO W/O SCOPE: CPT | Mod: QW

## 2019-08-21 PROCEDURE — 99213 OFFICE O/P EST LOW 20 MIN: CPT | Mod: 25

## 2019-08-22 ENCOUNTER — RESULT REVIEW (OUTPATIENT)
Age: 69
End: 2019-08-22

## 2019-08-22 LAB
APPEARANCE: CLEAR
BACTERIA: NEGATIVE
BILIRUBIN URINE: NEGATIVE
BLOOD URINE: NEGATIVE
COLOR: NORMAL
GLUCOSE QUALITATIVE U: ABNORMAL
HYALINE CASTS: 0 /LPF
KETONES URINE: NEGATIVE
LEUKOCYTE ESTERASE URINE: NEGATIVE
MICROSCOPIC-UA: NORMAL
NITRITE URINE: NEGATIVE
PH URINE: 6.5
PROTEIN URINE: NEGATIVE
RED BLOOD CELLS URINE: 1 /HPF
SPECIFIC GRAVITY URINE: 1.01
SQUAMOUS EPITHELIAL CELLS: 0 /HPF
UROBILINOGEN URINE: NORMAL
WHITE BLOOD CELLS URINE: 3 /HPF

## 2019-08-22 NOTE — HISTORY OF PRESENT ILLNESS
[FreeTextEntry1] : This 67 yo woman with initial C/O + OFELIA, + UUI, frequency, urgency, nocturia, recurrent UTI, not proven, history of Stroke and is sexually active was started on Oxybutynin ER 10 mg 7/10/19 which she states she stopped due to dizziness.  She has just returned from about 3 weeks in [facility] and states she was treated with antibiotics there.  She reports that when she urinates, it feels like pins and needles.  She has glycosuria and is not managing her diabetes.  She is asking for surgery to help her problem.

## 2019-08-22 NOTE — DISCUSSION/SUMMARY
[FreeTextEntry1] : We spoke at length about her symptoms, possible etiology and treatment options.  We reviewed the types of incontinence and treatment options.  We discussed the impact of high levels of glucose in her urine.  In addition we discussed surgical treatment options.  Urine was obtained with a catheter for accuracy and sent to the lab for testing.  I recommended urodynamics testing and a F/U appointment with Dr Little.  I was able to answer all of her questions.

## 2019-08-22 NOTE — CHIEF COMPLAINT
[Poor Bladder Control] : poor bladder control [Recurrent Urinary Infections] : recurrent urinary infections [Painful Urination] : painful urination

## 2019-08-22 NOTE — REASON FOR VISIT
[Follow-up Visit ___] : a follow-up visit  for [unfilled] [[employer] ] : provided by [employer]   [FreeTextEntry2] : Hi [FreeTextEntry1] : [id number]

## 2019-08-26 ENCOUNTER — RESULT REVIEW (OUTPATIENT)
Age: 69
End: 2019-08-26

## 2019-08-26 LAB — BACTERIA UR CULT: ABNORMAL

## 2019-09-04 ENCOUNTER — APPOINTMENT (OUTPATIENT)
Dept: UROGYNECOLOGY | Facility: CLINIC | Age: 69
End: 2019-09-04
Payer: MEDICARE

## 2019-09-04 PROCEDURE — 51784 ANAL/URINARY MUSCLE STUDY: CPT

## 2019-09-04 PROCEDURE — 51797 INTRAABDOMINAL PRESSURE TEST: CPT

## 2019-09-04 PROCEDURE — 51729 CYSTOMETROGRAM W/VP&UP: CPT

## 2019-09-04 PROCEDURE — 51741 ELECTRO-UROFLOWMETRY FIRST: CPT

## 2019-10-01 ENCOUNTER — APPOINTMENT (OUTPATIENT)
Dept: UROGYNECOLOGY | Facility: CLINIC | Age: 69
End: 2019-10-01
Payer: MEDICARE

## 2019-10-01 PROCEDURE — 99214 OFFICE O/P EST MOD 30 MIN: CPT

## 2019-10-01 NOTE — ASSESSMENT
[FreeTextEntry1] : HALEIGH and urethral hypermobility, UDS demonstrating both DO and GSUI. We reviewed OFELIA vs OAB etiologies with diagrams and translation. The etiology of OFELIA was discussed. Management options including observation, behavioral modifications, medication, pessary, Impressa insert, periurethral bulking via cystoscopy, and surgery with midurethral sling were reviewed. The etiology of OAB was discussed. Management options including observation, behavioral modifications (dietary changes, monitoring fluid intake, bladder training, timed voids, use of pads/protective garments), kegels, PT, medications, PTNS, SNS, and bladder Botox were all reviewed. She is more bothered by GSUI, and would like to proceed with MUS. MUS to treat OFELIA and not OAB reveiwed. \par \par Risks and benefits of a TOT sling versus a TVT sling route were discussed including bladder and bowel perforation, vascular injury and hemorrhage, voiding dysfunction, UTIs, dyspareunia, and groin pain. The risks and benefits of the procedure were discussed and included but were not limited to bleeding, hemorrhage, transfusion, blood clot formation, infection, failure, mesh erosion or exposure, fistula formation, chronic pain, dyspareunia, damage to surrounding organs such as bladder, ureters, and bowel, UTIs, voiding dysfunction, and urinary retention. The FDA notifications regarding the use of vaginal mesh were reviewed. We discussed the possibility of going home with a catheter. Hospital stay, anesthesia, and postoperative restrictions were discussed. She understood all counseling. \par \par Glucose control and increased risk of mesh related issues such as exposure, infections, and issues with would healing if not well controlled reviewed.\par \par All questions were answered and she wishes to proceed with surgical correction. Consent was signed and witnessed in the office with Dutch official translation.\par \par Plan:\par [] book TVT/Cysto [facility]\par [] PSTs\par [] med clearance\par [] DM control \par

## 2019-10-01 NOTE — REASON FOR VISIT
[[employer] ] : provided by [employer]   [FreeTextEntry1] : [id number] [FreeTextEntry2] : Mckayla [TWNoteComboBox1] : Yemeni

## 2019-10-01 NOTE — HISTORY OF PRESENT ILLNESS
[FreeTextEntry1] : HALEIGH and recurrent UTIs, at initial visit was counseled re: bladder training, oxybutynin 10mg (plan was for 6 months), weight loss, DM glucose control. Today, reports no change in urinary symptoms. Never tried the oxybutynin as she dc'd secondary to dizziness and nausa after few days.\par \par UDS: PVR normal, ? capacity low due to DO with leakage, +GSUI, +ISD. Graph review - unclear if GSUI vs stress-induced DO or if CST demonstrated at time of DO contraction.\par \par Hx ABI/BSO\par \par DM, HTN, prior stroke, high chol, on a diuretic, Cr 1.0

## 2019-10-04 ENCOUNTER — APPOINTMENT (OUTPATIENT)
Dept: UROGYNECOLOGY | Facility: CLINIC | Age: 69
End: 2019-10-04

## 2019-11-05 ENCOUNTER — APPOINTMENT (OUTPATIENT)
Dept: UROGYNECOLOGY | Facility: CLINIC | Age: 69
End: 2019-11-05
Payer: MEDICARE

## 2019-11-05 ENCOUNTER — RESULT CHARGE (OUTPATIENT)
Age: 69
End: 2019-11-05

## 2019-11-05 LAB
BILIRUB UR QL STRIP: NEGATIVE
CLARITY UR: CLEAR
COLLECTION METHOD: NORMAL
GLUCOSE UR-MCNC: 250
HCG UR QL: 0.2 EU/DL
HGB UR QL STRIP.AUTO: NORMAL
KETONES UR-MCNC: NEGATIVE
LEUKOCYTE ESTERASE UR QL STRIP: NEGATIVE
NITRITE UR QL STRIP: NEGATIVE
PH UR STRIP: 7
PROT UR STRIP-MCNC: >=300
SP GR UR STRIP: 1.02

## 2019-11-05 PROCEDURE — 52000 CYSTOURETHROSCOPY: CPT

## 2019-11-05 PROCEDURE — 99213 OFFICE O/P EST LOW 20 MIN: CPT | Mod: 25

## 2019-11-05 PROCEDURE — 81003 URINALYSIS AUTO W/O SCOPE: CPT | Mod: QW

## 2019-12-03 ENCOUNTER — OUTPATIENT (OUTPATIENT)
Dept: OUTPATIENT SERVICES | Facility: HOSPITAL | Age: 69
LOS: 1 days | End: 2019-12-03
Payer: COMMERCIAL

## 2019-12-03 DIAGNOSIS — Z86.011 PERSONAL HISTORY OF BENIGN NEOPLASM OF THE BRAIN: Chronic | ICD-10-CM

## 2019-12-03 DIAGNOSIS — Z01.818 ENCOUNTER FOR OTHER PREPROCEDURAL EXAMINATION: ICD-10-CM

## 2019-12-03 LAB
ALBUMIN SERPL ELPH-MCNC: 4.8 G/DL — SIGNIFICANT CHANGE UP (ref 3.3–5.2)
ALP SERPL-CCNC: 81 U/L — SIGNIFICANT CHANGE UP (ref 40–120)
ALT FLD-CCNC: 33 U/L — HIGH
ANION GAP SERPL CALC-SCNC: 14 MMOL/L — SIGNIFICANT CHANGE UP (ref 5–17)
APPEARANCE UR: CLEAR — SIGNIFICANT CHANGE UP
APTT BLD: 29.9 SEC — SIGNIFICANT CHANGE UP (ref 27.5–36.3)
AST SERPL-CCNC: 28 U/L — SIGNIFICANT CHANGE UP
BACTERIA # UR AUTO: ABNORMAL
BASOPHILS # BLD AUTO: 0.08 K/UL — SIGNIFICANT CHANGE UP (ref 0–0.2)
BASOPHILS NFR BLD AUTO: 0.8 % — SIGNIFICANT CHANGE UP (ref 0–2)
BILIRUB SERPL-MCNC: 0.4 MG/DL — SIGNIFICANT CHANGE UP (ref 0.4–2)
BILIRUB UR-MCNC: NEGATIVE — SIGNIFICANT CHANGE UP
BUN SERPL-MCNC: 30 MG/DL — HIGH (ref 8–20)
CALCIUM SERPL-MCNC: 10.1 MG/DL — SIGNIFICANT CHANGE UP (ref 8.6–10.2)
CHLORIDE SERPL-SCNC: 97 MMOL/L — LOW (ref 98–107)
CO2 SERPL-SCNC: 27 MMOL/L — SIGNIFICANT CHANGE UP (ref 22–29)
COLOR SPEC: YELLOW — SIGNIFICANT CHANGE UP
CREAT SERPL-MCNC: 0.88 MG/DL — SIGNIFICANT CHANGE UP (ref 0.5–1.3)
DIFF PNL FLD: NEGATIVE — SIGNIFICANT CHANGE UP
EOSINOPHIL # BLD AUTO: 0.28 K/UL — SIGNIFICANT CHANGE UP (ref 0–0.5)
EOSINOPHIL NFR BLD AUTO: 2.9 % — SIGNIFICANT CHANGE UP (ref 0–6)
EPI CELLS # UR: SIGNIFICANT CHANGE UP
GLUCOSE SERPL-MCNC: 171 MG/DL — HIGH (ref 70–115)
GLUCOSE UR QL: NEGATIVE MG/DL — SIGNIFICANT CHANGE UP
HCT VFR BLD CALC: 40.9 % — SIGNIFICANT CHANGE UP (ref 34.5–45)
HGB BLD-MCNC: 13.9 G/DL — SIGNIFICANT CHANGE UP (ref 11.5–15.5)
IMM GRANULOCYTES NFR BLD AUTO: 0.6 % — SIGNIFICANT CHANGE UP (ref 0–1.5)
INR BLD: 0.98 RATIO — SIGNIFICANT CHANGE UP (ref 0.88–1.16)
KETONES UR-MCNC: NEGATIVE — SIGNIFICANT CHANGE UP
LEUKOCYTE ESTERASE UR-ACNC: ABNORMAL
LYMPHOCYTES # BLD AUTO: 3.18 K/UL — SIGNIFICANT CHANGE UP (ref 1–3.3)
LYMPHOCYTES # BLD AUTO: 33.4 % — SIGNIFICANT CHANGE UP (ref 13–44)
MCHC RBC-ENTMCNC: 28.5 PG — SIGNIFICANT CHANGE UP (ref 27–34)
MCHC RBC-ENTMCNC: 34 GM/DL — SIGNIFICANT CHANGE UP (ref 32–36)
MCV RBC AUTO: 83.8 FL — SIGNIFICANT CHANGE UP (ref 80–100)
MONOCYTES # BLD AUTO: 0.79 K/UL — SIGNIFICANT CHANGE UP (ref 0–0.9)
MONOCYTES NFR BLD AUTO: 8.3 % — SIGNIFICANT CHANGE UP (ref 2–14)
NEUTROPHILS # BLD AUTO: 5.13 K/UL — SIGNIFICANT CHANGE UP (ref 1.8–7.4)
NEUTROPHILS NFR BLD AUTO: 54 % — SIGNIFICANT CHANGE UP (ref 43–77)
NITRITE UR-MCNC: NEGATIVE — SIGNIFICANT CHANGE UP
PH UR: 6 — SIGNIFICANT CHANGE UP (ref 5–8)
PLATELET # BLD AUTO: 385 K/UL — SIGNIFICANT CHANGE UP (ref 150–400)
POTASSIUM SERPL-MCNC: 3.1 MMOL/L — LOW (ref 3.5–5.3)
POTASSIUM SERPL-SCNC: 3.1 MMOL/L — LOW (ref 3.5–5.3)
PROT SERPL-MCNC: 8 G/DL — SIGNIFICANT CHANGE UP (ref 6.6–8.7)
PROT UR-MCNC: 30 MG/DL
PROTHROM AB SERPL-ACNC: 11.3 SEC — SIGNIFICANT CHANGE UP (ref 10–12.9)
RBC # BLD: 4.88 M/UL — SIGNIFICANT CHANGE UP (ref 3.8–5.2)
RBC # FLD: 13.8 % — SIGNIFICANT CHANGE UP (ref 10.3–14.5)
RBC CASTS # UR COMP ASSIST: SIGNIFICANT CHANGE UP /HPF (ref 0–4)
SODIUM SERPL-SCNC: 138 MMOL/L — SIGNIFICANT CHANGE UP (ref 135–145)
SP GR SPEC: 1.01 — SIGNIFICANT CHANGE UP (ref 1.01–1.02)
UROBILINOGEN FLD QL: NEGATIVE MG/DL — SIGNIFICANT CHANGE UP
WBC # BLD: 9.52 K/UL — SIGNIFICANT CHANGE UP (ref 3.8–10.5)
WBC # FLD AUTO: 9.52 K/UL — SIGNIFICANT CHANGE UP (ref 3.8–10.5)
WBC UR QL: SIGNIFICANT CHANGE UP

## 2019-12-03 PROCEDURE — 93010 ELECTROCARDIOGRAM REPORT: CPT

## 2019-12-03 PROCEDURE — G0463: CPT

## 2019-12-03 PROCEDURE — T1013: CPT

## 2019-12-03 PROCEDURE — 93005 ELECTROCARDIOGRAM TRACING: CPT

## 2019-12-10 ENCOUNTER — APPOINTMENT (OUTPATIENT)
Dept: UROGYNECOLOGY | Facility: AMBULATORY SURGERY CENTER | Age: 69
End: 2019-12-10
Payer: MEDICARE

## 2019-12-10 PROCEDURE — 57288 REPAIR BLADDER DEFECT: CPT

## 2019-12-17 ENCOUNTER — APPOINTMENT (OUTPATIENT)
Dept: UROGYNECOLOGY | Facility: CLINIC | Age: 69
End: 2019-12-17
Payer: MEDICARE

## 2019-12-17 ENCOUNTER — RESULT CHARGE (OUTPATIENT)
Age: 69
End: 2019-12-17

## 2019-12-17 VITALS — DIASTOLIC BLOOD PRESSURE: 75 MMHG | SYSTOLIC BLOOD PRESSURE: 135 MMHG

## 2019-12-17 LAB
BILIRUB UR QL STRIP: NEGATIVE
CLARITY UR: CLEAR
COLLECTION METHOD: NORMAL
GLUCOSE UR-MCNC: 250
HCG UR QL: 0.2 EU/DL
HGB UR QL STRIP.AUTO: NORMAL
KETONES UR-MCNC: NEGATIVE
LEUKOCYTE ESTERASE UR QL STRIP: NORMAL
NITRITE UR QL STRIP: NEGATIVE
PH UR STRIP: 6
PROT UR STRIP-MCNC: NEGATIVE
SP GR UR STRIP: 1.02

## 2019-12-17 PROCEDURE — 99024 POSTOP FOLLOW-UP VISIT: CPT

## 2019-12-17 PROCEDURE — 51798 US URINE CAPACITY MEASURE: CPT

## 2019-12-17 PROCEDURE — 81003 URINALYSIS AUTO W/O SCOPE: CPT | Mod: QW

## 2019-12-17 NOTE — REASON FOR VISIT
[Follow-up Visit ___] : a follow-up visit  for [unfilled] [TWNoteComboBox1] : Nauruan [FreeTextEntry2] : Rahul [FreeTextEntry1] : [id number]

## 2019-12-17 NOTE — OBJECTIVE
[Post Void Residual ____ ml] : Post Void Residual was [unfilled] ml [Soft and Nontender] : soft and nontender [FreeTextEntry2] : Puncture wounds on Mons, CDI, slightly tender, some bruising in resolution

## 2019-12-17 NOTE — DISCUSSION/SUMMARY
[Post-Op instructions given. Pt/family verbalizes understanding] : post-operative instructions were provided to the patient/family who verbalize understanding [FreeTextEntry1] : Julien was feeling pain at puncture sites on Mons.  I instructed her to use Ice, Ibuprofen with food and to not do any lifting or heavy activity.  Vagina patent, sutures palpable, no mesh palpated.  Has appt 12/20, no need to keep it unless she feels she would like to be seen.

## 2019-12-17 NOTE — SUBJECTIVE
[FreeTextEntry1] : A & O X 3 [FreeTextEntry8] : None [FreeTextEntry7] : Mild at sling puncture sites [FreeTextEntry5] : no leaking [FreeTextEntry6] : good [FreeTextEntry3] : As before [FreeTextEntry4] : + [FreeTextEntry2] : As before

## 2019-12-20 ENCOUNTER — APPOINTMENT (OUTPATIENT)
Dept: UROGYNECOLOGY | Facility: CLINIC | Age: 69
End: 2019-12-20

## 2019-12-27 NOTE — PATIENT PROFILE ADULT. - MEDICATIONS BROUGHT TO HOSPITAL, PROFILE
December 27, 2019      [facility]  [address]  [facility] [address]  Phone: [phone]  Fax: [phone]       Patient: Terese Macias   YOB: 1974  Date of Visit: 12/27/2019    To Whom It May Concern:    Any Macias  was seen at [facility] on 12/27/2019. She may return to work/school on 1/6/2020 without restrictions..    Sincerely,    Stacy Frye MD      no

## 2020-01-17 ENCOUNTER — APPOINTMENT (OUTPATIENT)
Dept: UROGYNECOLOGY | Facility: CLINIC | Age: 70
End: 2020-01-17
Payer: MEDICARE

## 2020-01-17 VITALS — WEIGHT: 153.38 LBS | TEMPERATURE: 97.8 F | SYSTOLIC BLOOD PRESSURE: 133 MMHG | DIASTOLIC BLOOD PRESSURE: 88 MMHG

## 2020-01-17 LAB
BILIRUB UR QL STRIP: NEGATIVE
CLARITY UR: CLEAR
COLLECTION METHOD: NORMAL
GLUCOSE UR-MCNC: 250
HCG UR QL: 0.2 EU/DL
HGB UR QL STRIP.AUTO: NEGATIVE
KETONES UR-MCNC: NORMAL
LEUKOCYTE ESTERASE UR QL STRIP: NORMAL
NITRITE UR QL STRIP: NEGATIVE
PH UR STRIP: 5
PROT UR STRIP-MCNC: 100
SP GR UR STRIP: 1.02

## 2020-01-17 PROCEDURE — 99024 POSTOP FOLLOW-UP VISIT: CPT

## 2020-01-17 PROCEDURE — 81003 URINALYSIS AUTO W/O SCOPE: CPT | Mod: QW

## 2020-01-17 PROCEDURE — 51798 US URINE CAPACITY MEASURE: CPT

## 2020-01-17 NOTE — REASON FOR VISIT
[Follow-up Visit ___] : a follow-up visit  for [unfilled] [[employer] ] : provided by [employer]   [FreeTextEntry1] : [id number] [FreeTextEntry2] : Jihan [TWNoteComboBox1] : Citizen of [place]

## 2020-01-17 NOTE — DISCUSSION/SUMMARY
[Post-Op instructions given. Pt/family verbalizes understanding] : post-operative instructions were provided to the patient/family who verbalize understanding [Risks/Benefits discussed. Pt/family verbalizes understanding] : risks and benefits of the procedure were discussed with the patient/family who verbalize understanding [FreeTextEntry1] : 5 week postop from 12/10/19: TVT MUS / Cysto. PVR normal, no mesh exposure, +stitch on exam, intact suture line, not yet dissolved. All activities except refrain from intercourse 2 more weeks. RTO 2 months for exam. All ques answered.

## 2020-01-17 NOTE — SUBJECTIVE
[FreeTextEntry7] : none [FreeTextEntry1] : no fever/chills, scant vag disch or pink spot with wiping [FreeTextEntry6] : normal no N/V [FreeTextEntry5] : no leakage, +urgency if full, no freq, no hematuria, no dysuria, no incomplete emptying, no flank pain [FreeTextEntry4] : normal without straining [FreeTextEntry3] : normal [FreeTextEntry2] : normal

## 2020-01-17 NOTE — OBJECTIVE
[Post Void Residual ____ ml] : Post Void Residual was [unfilled] ml [Soft and Nontender] : soft and nontender [Clean, Dry, Intact] : Clean, Dry, Intact [Good Support] : Good support [No Masses or Tenderness] : no masses or tenderness [Healing well] : healing well [FreeTextEntry3] : no mesh exposure, nontender, +vicryl stitch along anterior suture line which is intact [FreeTextEntry2] : tvt exit sites c/d/i mons x 2

## 2020-02-11 ENCOUNTER — APPOINTMENT (OUTPATIENT)
Dept: UROGYNECOLOGY | Facility: CLINIC | Age: 70
End: 2020-02-11
Payer: MEDICARE

## 2020-02-11 VITALS
WEIGHT: 153 LBS | OXYGEN SATURATION: 99 % | HEART RATE: 93 BPM | SYSTOLIC BLOOD PRESSURE: 144 MMHG | TEMPERATURE: 98 F | DIASTOLIC BLOOD PRESSURE: 70 MMHG

## 2020-02-11 LAB
BILIRUB UR QL STRIP: NEGATIVE
CLARITY UR: CLEAR
COLLECTION METHOD: NORMAL
GLUCOSE UR-MCNC: 500
HCG UR QL: 0.2 EU/DL
HGB UR QL STRIP.AUTO: NORMAL
KETONES UR-MCNC: NEGATIVE
LEUKOCYTE ESTERASE UR QL STRIP: NEGATIVE
NITRITE UR QL STRIP: NEGATIVE
PH UR STRIP: 7
PROT UR STRIP-MCNC: NEGATIVE
SP GR UR STRIP: 1.02

## 2020-02-11 PROCEDURE — 51798 US URINE CAPACITY MEASURE: CPT

## 2020-02-11 PROCEDURE — 81003 URINALYSIS AUTO W/O SCOPE: CPT | Mod: QW

## 2020-02-11 PROCEDURE — 99024 POSTOP FOLLOW-UP VISIT: CPT

## 2020-02-11 NOTE — DISCUSSION/SUMMARY
[Post-Op instructions given. Pt/family verbalizes understanding] : post-operative instructions were provided to the patient/family who verbalize understanding [Risks/Benefits discussed. Pt/family verbalizes understanding] : risks and benefits of the procedure were discussed with the patient/family who verbalize understanding [FreeTextEntry1] : 7 weeks postop from 12/10/19: TVT MUS / Cysto. Doing well and appropriately. Postop PVRs normal. No mesh exposure. REturn to all usual activities, PMD, gyn care. RTO 1 year postop or prn issuses. Glucose control and behavioral modifications reviewed. All ques answered.

## 2020-02-11 NOTE — OBJECTIVE
[Post Void Residual ____ ml] : Post Void Residual was [unfilled] ml [Soft and Nontender] : soft and nontender [Clean, Dry, Intact] : Clean, Dry, Intact [No Masses or Tenderness] : no masses or tenderness [Good Support] : Good support [Healing well] : healing well [FreeTextEntry2] : mons exit sites  [FreeTextEntry3] : no mesh exposure, no sutures, healed

## 2020-02-11 NOTE — SUBJECTIVE
[FreeTextEntry1] : no fever/chills, no vag discharge or bleeding, no flank pain [FreeTextEntry7] : none [FreeTextEntry5] : good, no leakage, no incomplete emptying, no dysuria, no hematuria, nocturia 1 [FreeTextEntry6] : normal no N/V [FreeTextEntry4] : normal without straining [FreeTextEntry3] : normal [FreeTextEntry2] : normal

## 2020-02-11 NOTE — REASON FOR VISIT
[Follow-up Visit ___] : a follow-up visit  for [unfilled] [[employer] ] : provided by [employer]   [FreeTextEntry1] : [id number] [FreeTextEntry2] : Hi [TWNoteComboBox1] : Russian

## 2020-04-08 ENCOUNTER — INPATIENT (INPATIENT)
Facility: HOSPITAL | Age: 70
LOS: 14 days | Discharge: NURSING, REHAB OR LONG-TERM CARE | DRG: 981 | End: 2020-04-23
Attending: INTERNAL MEDICINE | Admitting: INTERNAL MEDICINE
Payer: COMMERCIAL

## 2020-04-08 VITALS
WEIGHT: 160.06 LBS | HEART RATE: 55 BPM | SYSTOLIC BLOOD PRESSURE: 171 MMHG | OXYGEN SATURATION: 91 % | TEMPERATURE: 103 F | HEIGHT: 64 IN | DIASTOLIC BLOOD PRESSURE: 63 MMHG | RESPIRATION RATE: 25 BRPM

## 2020-04-08 DIAGNOSIS — Z86.011 PERSONAL HISTORY OF BENIGN NEOPLASM OF THE BRAIN: Chronic | ICD-10-CM

## 2020-04-08 DIAGNOSIS — R09.02 HYPOXEMIA: ICD-10-CM

## 2020-04-08 LAB
ALBUMIN SERPL ELPH-MCNC: 3.6 G/DL — SIGNIFICANT CHANGE UP (ref 3.3–5.2)
ALP SERPL-CCNC: 67 U/L — SIGNIFICANT CHANGE UP (ref 40–120)
ALT FLD-CCNC: 26 U/L — SIGNIFICANT CHANGE UP
ANION GAP SERPL CALC-SCNC: 16 MMOL/L — SIGNIFICANT CHANGE UP (ref 5–17)
AST SERPL-CCNC: 48 U/L — HIGH
BASOPHILS # BLD AUTO: 0.01 K/UL — SIGNIFICANT CHANGE UP (ref 0–0.2)
BASOPHILS NFR BLD AUTO: 0.1 % — SIGNIFICANT CHANGE UP (ref 0–2)
BILIRUB SERPL-MCNC: 0.5 MG/DL — SIGNIFICANT CHANGE UP (ref 0.4–2)
BUN SERPL-MCNC: 16 MG/DL — SIGNIFICANT CHANGE UP (ref 8–20)
CALCIUM SERPL-MCNC: 8.6 MG/DL — SIGNIFICANT CHANGE UP (ref 8.6–10.2)
CHLORIDE SERPL-SCNC: 82 MMOL/L — LOW (ref 98–107)
CO2 SERPL-SCNC: 28 MMOL/L — SIGNIFICANT CHANGE UP (ref 22–29)
CREAT SERPL-MCNC: 0.78 MG/DL — SIGNIFICANT CHANGE UP (ref 0.5–1.3)
D DIMER BLD IA.RAPID-MCNC: <150 NG/ML DDU — SIGNIFICANT CHANGE UP
EOSINOPHIL # BLD AUTO: 0 K/UL — SIGNIFICANT CHANGE UP (ref 0–0.5)
EOSINOPHIL NFR BLD AUTO: 0 % — SIGNIFICANT CHANGE UP (ref 0–6)
FERRITIN SERPL-MCNC: 377 NG/ML — HIGH (ref 15–150)
GLUCOSE SERPL-MCNC: 240 MG/DL — HIGH (ref 70–99)
HCT VFR BLD CALC: 36 % — SIGNIFICANT CHANGE UP (ref 34.5–45)
HGB BLD-MCNC: 12.8 G/DL — SIGNIFICANT CHANGE UP (ref 11.5–15.5)
IMM GRANULOCYTES NFR BLD AUTO: 0.8 % — SIGNIFICANT CHANGE UP (ref 0–1.5)
LACTATE BLDV-MCNC: 1.2 MMOL/L — SIGNIFICANT CHANGE UP (ref 0.5–2)
LDH SERPL L TO P-CCNC: 370 U/L — HIGH (ref 98–192)
LYMPHOCYTES # BLD AUTO: 1 K/UL — SIGNIFICANT CHANGE UP (ref 1–3.3)
LYMPHOCYTES # BLD AUTO: 13.7 % — SIGNIFICANT CHANGE UP (ref 13–44)
MCHC RBC-ENTMCNC: 28.7 PG — SIGNIFICANT CHANGE UP (ref 27–34)
MCHC RBC-ENTMCNC: 35.6 GM/DL — SIGNIFICANT CHANGE UP (ref 32–36)
MCV RBC AUTO: 80.7 FL — SIGNIFICANT CHANGE UP (ref 80–100)
MONOCYTES # BLD AUTO: 0.54 K/UL — SIGNIFICANT CHANGE UP (ref 0–0.9)
MONOCYTES NFR BLD AUTO: 7.4 % — SIGNIFICANT CHANGE UP (ref 2–14)
NEUTROPHILS # BLD AUTO: 5.69 K/UL — SIGNIFICANT CHANGE UP (ref 1.8–7.4)
NEUTROPHILS NFR BLD AUTO: 78 % — HIGH (ref 43–77)
PLATELET # BLD AUTO: 273 K/UL — SIGNIFICANT CHANGE UP (ref 150–400)
POTASSIUM SERPL-MCNC: 3.5 MMOL/L — SIGNIFICANT CHANGE UP (ref 3.5–5.3)
POTASSIUM SERPL-SCNC: 3.5 MMOL/L — SIGNIFICANT CHANGE UP (ref 3.5–5.3)
PROCALCITONIN SERPL-MCNC: 0.25 NG/ML — HIGH (ref 0.02–0.1)
PROT SERPL-MCNC: 6.8 G/DL — SIGNIFICANT CHANGE UP (ref 6.6–8.7)
RBC # BLD: 4.46 M/UL — SIGNIFICANT CHANGE UP (ref 3.8–5.2)
RBC # FLD: 12.6 % — SIGNIFICANT CHANGE UP (ref 10.3–14.5)
SODIUM SERPL-SCNC: 126 MMOL/L — LOW (ref 135–145)
WBC # BLD: 7.3 K/UL — SIGNIFICANT CHANGE UP (ref 3.8–10.5)
WBC # FLD AUTO: 7.3 K/UL — SIGNIFICANT CHANGE UP (ref 3.8–10.5)

## 2020-04-08 PROCEDURE — 99285 EMERGENCY DEPT VISIT HI MDM: CPT

## 2020-04-08 PROCEDURE — 99223 1ST HOSP IP/OBS HIGH 75: CPT

## 2020-04-08 PROCEDURE — 93010 ELECTROCARDIOGRAM REPORT: CPT

## 2020-04-08 PROCEDURE — 71045 X-RAY EXAM CHEST 1 VIEW: CPT | Mod: 26

## 2020-04-08 RX ORDER — ACETAMINOPHEN 500 MG
650 TABLET ORAL ONCE
Refills: 0 | Status: COMPLETED | OUTPATIENT
Start: 2020-04-08 | End: 2020-04-08

## 2020-04-08 RX ADMIN — Medication 650 MILLIGRAM(S): at 19:22

## 2020-04-08 RX ADMIN — Medication 650 MILLIGRAM(S): at 21:47
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
## 2020-04-08 NOTE — ED PROVIDER NOTE - OBJECTIVE STATEMENT
This patient is a 69 year old woman with hx of HTN, HLD, and DM who presents to the ER c/o 3 days of fever and 2 days of cough and SOB.  Patient reports that she and her  were tested about a week ago.  Her result was negative but his result was positive. This patient is a 69 year old woman with hx of HTN, HLD, and DM who presents to the ER c/o 3 days of fever and 2 days of cough and SOB.  Patient reports that she and her  were tested about a week ago.  Her result was negative but his result was positive however they did not isolate themselves and continued to sleep in the same room.  She also reports that she recently returned from [facility] about 3 weeks ago.

## 2020-04-08 NOTE — H&P ADULT - NSHPLABSRESULTS_GEN_ALL_CORE
12.8   7.30  )-----------( 273      ( 08 Apr 2020 19:49 )             36.0         04-08    126<L>  |  82<L>  |  16.0  ----------------------------<  240<H>  3.5   |  28.0  |  0.78    Ca    8.6      08 Apr 2020 19:49    TPro  6.8  /  Alb  3.6  /  TBili  0.5  /  DBili  x   /  AST  48<H>  /  ALT  26  /  AlkPhos  67  04-08

## 2020-04-08 NOTE — ED PROVIDER NOTE - CARE PLAN
Principal Discharge DX:	Hypoxia  Secondary Diagnosis:	SOB (shortness of breath)  Secondary Diagnosis:	Suspected COVID-19 virus infection

## 2020-04-08 NOTE — H&P ADULT - NSICDXPASTMEDICALHX_GEN_ALL_CORE_FT
PAST MEDICAL HISTORY:  DM (diabetes mellitus)     H/O gastroesophageal reflux (GERD)     HTN (hypertension)

## 2020-04-08 NOTE — H&P ADULT - NSHPPHYSICALEXAM_GEN_ALL_CORE
Vital Signs Last 24 Hrs  T(C): 37.3 (09 Apr 2020 01:46), Max: 39.4 (08 Apr 2020 18:12)  T(F): 99.2 (09 Apr 2020 01:46), Max: 102.9 (08 Apr 2020 18:12)  HR: 42 (09 Apr 2020 01:46) (42 - 55)  BP: 128/60 (09 Apr 2020 01:46) (128/60 - 171/63)  BP(mean): --  RR: 18 (09 Apr 2020 01:46) (18 - 25)  SpO2: 94% (09 Apr 2020 01:46) (91% - 95%)

## 2020-04-08 NOTE — H&P ADULT - HISTORY OF PRESENT ILLNESS
69yoF hx HTN, HLD, DM, GERD presenting with 5 days history of subjective fever and chills, dry cough, pleuritic chest pain and dyspnea at rest and on exertion.  Pt also endorsing some nausea and vomiting. Pt denies any abdominal pain or diarrhea.  Pt reports she was tested for COVID at a testing center somewhere in  along with her  recently.  Her  tested positive and she tested negative however her  didn’t self-quarantine and they slept in the same bed together.     Of note, pt occasionally bradycardia to 40s on monitor.   EKG showed sinus shirley in 40s with ?2nd degree AV block and QTc 517. In ED, pt desatted to 85% on RA and was placed on 2-3L NC with improvement.

## 2020-04-08 NOTE — ED ADULT NURSE NOTE - OBJECTIVE STATEMENT
at bedside, pt c/o fever x3 days and cough and sob x2days, pt  +covid pt was also recently tested of covid and was negative however pt and  are still sleeping in same bed and are not isolating from each other, resp even and unlabored while pt is at rest, abd soft NTND

## 2020-04-08 NOTE — ED ADULT NURSE REASSESSMENT NOTE - NS ED NURSE REASSESS COMMENT FT1
pt HR 46 c/o pain to epigastric midsternal area , resp even and unlabored no distress noted, repeat EKG done and given to Md Gonzales, pt remains on cardiac monitor will continue to monitor

## 2020-04-08 NOTE — H&P ADULT - ASSESSMENT
[facility]  Interdisciplinary Discharge Instructions    Date of Discharge: 3/16/2019     Ambulatory: Yes  Time of Discharge: 0930   Transportation: private car  Residence Upon Discharge: [facility] [address]    Telephone Number: [phone] (home)     Address: [facility] [address]         Sharps / Valuables Returned: Yes    Discharge Medications: Yes     Discharge Prescriptions: N/A     Instructions given by: RN  Patients's own medication returned: N/A    Discharge Instructions: Include work/school/activity/diet restrictions, etc as appropriate:     Take all medications as prescribed and attend all aftercare appointments scheduled.  Seek psychiatric and/or emergency services before acting on dangerous impulses.    Written educational materials given: Yes  Satisfaction survey completed: Unknown    I have received instruction in these areas and have had an opportunity to ask questions, understand what has been discussed, and have received a copy of this form.    Parent Signature:                                                                                            Date:                  Patient Signature:                                                                                            Date:                      Provided community resources    Samina Conleyyolis  [address] [place] [address]/ [address]  [phone]  APPT: Wed 3/20/19 at 9:30am/ arrive at 9:10am for paperwork   Parent must accompany  Bring insurance cards    A medication prescriber will be assigned after you see the therapist    If you can't keep this appointment, please call the day before or first thing in AM  Otherwise this will count as a \"no show\" and you won't be able to reschedule for a year    69yoF hx HTN, HLD, DM, GERD presenting with 5 days history of subjective fever and chills, dry cough, pleuritic chest pain and dyspnea at rest and on exertion, on admission found to be hypoxic on room air with CXR showing bilateral infiltrates     Acute hypoxemic respiratory failure due to suspected COVID PNA  -Admit to   -COVID PRC pending   -Will defer plaquenil use given  (could be artificially prolonged from bradycardia)  -Repeat EKG in AM to reassess QTC and if candidate for plaquenil   -Pt says she received 5 day course of azithromycin as outpatient, will defer  -Continue with NC, wean as tolerated   -Albuterol MDI  -Tylenol PRN  -Will do 1L IVF challenge for hyponatremia as below, but no maintenance IVF    Hyponatremia  -Suspect related to HCTZ use and hypovolemic hyponatremia   -Hold HCTZ  -Given GI losses from vomiting, will give 1L bolus over 2 hours  -Check urine electrolytes  -Check TSH, uric acid, cortisol     Bradycardia  EKG shows sinus bradycardia, HR 52, on monitor pt occasionally went down to 40s,   Check vital signs back in 2017 visit, HR in 80s-90s  EKG showed NSR, HR 75, RBBB    HTN  -Holding HCTZ for hyponatremia  -Hold amlodipine, hydralazine    DM   -On Novolin 100U, will reduce dose by 50% and split between basal and pre-meal insulin  -Start Lantus 25U and Humalog 8U TID  -Hold glipizide   -Consistent carbohydrate diet   -Insulin sliding scale     HLD  -Statin resumed     GERD  -Famotidine     Prophylactic measure   -Intermittent pneumatic compression 69yoF hx HTN, HLD, DM, GERD presenting with 5 days history of subjective fever and chills, dry cough, pleuritic chest pain and dyspnea at rest and on exertion, on admission found to be hypoxic on room air with CXR showing bilateral infiltrates     Acute hypoxemic respiratory failure due to suspected COVID PNA  -Admit to   -COVID PRC pending   -Will defer plaquenil use given  (could be artificially prolonged from bradycardia)  -Repeat EKG in AM to reassess QTC and if candidate for plaquenil   -Pt says she received 5 day course of azithromycin as outpatient, will defer  -Continue with NC, wean as tolerated   -Albuterol MDI  -Tylenol PRN  -Will do 1L IVF challenge for hyponatremia as below, but no maintenance IVF    Hyponatremia  -Suspect related to HCTZ use and hypovolemic hyponatremia   -Hold HCTZ  -Given GI losses from vomiting, will give 1L bolus over 2 hours  -Check urine electrolytes  -Check TSH, uric acid, cortisol     Bradycardia  -EKG shows sinus bradycardia, HR 52, on monitor pt occasionally went down to 40s,   -Check vital signs back in 2017 visit, HR in 80s-90s  -EKG showed NSR, HR 75, RBBB  -Monitored bed    HTN  -Holding HCTZ for hyponatremia  -Hold amlodipine, hydralazine    DM   -On Novolin 100U, will reduce dose by 50% and split between basal and pre-meal insulin  -Start Lantus 25U and Humalog 8U TID  -Hold glipizide   -Consistent carbohydrate diet   -Insulin sliding scale     HLD  -Statin resumed     GERD  -Famotidine     Prophylactic measure   -Intermittent pneumatic compression 69yoF hx HTN, HLD, DM, GERD presenting with 5 days history of subjective fever and chills, dry cough, pleuritic chest pain and dyspnea at rest and on exertion, on admission found to be hypoxic on room air with CXR showing bilateral infiltrates     Acute hypoxemic respiratory failure due to suspected COVID PNA  -Admit to   -COVID PRC pending   -Will defer plaquenil use given  (could be artificially prolonged from bradycardia)  -Repeat EKG in AM to reassess QTC and if candidate for plaquenil   -Pt says she received 5 day course of azithromycin as outpatient, will defer  -Continue with NC, wean as tolerated   -Albuterol MDI  -Tylenol PRN  -Will do 1L IVF challenge for hyponatremia as below, but no maintenance IVF    Hyponatremia  -Suspect related to HCTZ use and hypovolemic hyponatremia   -Hold HCTZ  -Given GI losses from vomiting, will give 1L bolus over 2 hours  -Check urine electrolytes  -Check TSH, uric acid, cortisol     Bradycardia  -EKG shows sinus bradycardia, HR 52, on monitor pt occasionally went down to 40s, but pt asymptomatic   -EKG showed NSR, HR 75, RBBB  -Monitored bed    HTN  -Holding HCTZ for hyponatremia  -Amlodipine and hydralazine     DM   -On Novolin 100U, will reduce dose by 50% and split between basal and pre-meal insulin  -Start Lantus 25U and Humalog 8U TID  -Hold glipizide   -Consistent carbohydrate diet   -Insulin sliding scale     HLD  -Statin resumed     GERD  -Famotidine     Prophylactic measure   -Intermittent pneumatic compression

## 2020-04-09 LAB
ANION GAP SERPL CALC-SCNC: 16 MMOL/L — SIGNIFICANT CHANGE UP (ref 5–17)
BUN SERPL-MCNC: 16 MG/DL — SIGNIFICANT CHANGE UP (ref 8–20)
CALCIUM SERPL-MCNC: 8.2 MG/DL — LOW (ref 8.6–10.2)
CHLORIDE SERPL-SCNC: 86 MMOL/L — LOW (ref 98–107)
CK MB CFR SERPL CALC: 4.3 NG/ML — SIGNIFICANT CHANGE UP (ref 0–6.7)
CK SERPL-CCNC: 802 U/L — HIGH (ref 25–170)
CO2 SERPL-SCNC: 24 MMOL/L — SIGNIFICANT CHANGE UP (ref 22–29)
CREAT SERPL-MCNC: 0.77 MG/DL — SIGNIFICANT CHANGE UP (ref 0.5–1.3)
GLUCOSE BLDC GLUCOMTR-MCNC: 152 MG/DL — HIGH (ref 70–99)
GLUCOSE BLDC GLUCOMTR-MCNC: 170 MG/DL — HIGH (ref 70–99)
GLUCOSE BLDC GLUCOMTR-MCNC: 180 MG/DL — HIGH (ref 70–99)
GLUCOSE BLDC GLUCOMTR-MCNC: 192 MG/DL — HIGH (ref 70–99)
GLUCOSE BLDC GLUCOMTR-MCNC: 284 MG/DL — HIGH (ref 70–99)
GLUCOSE SERPL-MCNC: 161 MG/DL — HIGH (ref 70–99)
POTASSIUM SERPL-MCNC: 3.8 MMOL/L — SIGNIFICANT CHANGE UP (ref 3.5–5.3)
POTASSIUM SERPL-SCNC: 3.8 MMOL/L — SIGNIFICANT CHANGE UP (ref 3.5–5.3)
SARS-COV-2 RNA SPEC QL NAA+PROBE: DETECTED
SODIUM SERPL-SCNC: 126 MMOL/L — LOW (ref 135–145)
TROPONIN T SERPL-MCNC: <0.01 NG/ML — SIGNIFICANT CHANGE UP (ref 0–0.06)
TSH SERPL-MCNC: 1.2 UIU/ML — SIGNIFICANT CHANGE UP (ref 0.27–4.2)
URATE SERPL-MCNC: 4.6 MG/DL — SIGNIFICANT CHANGE UP (ref 2.4–5.7)

## 2020-04-09 PROCEDURE — 93010 ELECTROCARDIOGRAM REPORT: CPT

## 2020-04-09 PROCEDURE — 99223 1ST HOSP IP/OBS HIGH 75: CPT

## 2020-04-09 RX ORDER — ACETAMINOPHEN 500 MG
650 TABLET ORAL EVERY 6 HOURS
Refills: 0 | Status: DISCONTINUED | OUTPATIENT
Start: 2020-04-09 | End: 2020-04-23

## 2020-04-09 RX ORDER — SIMVASTATIN 20 MG/1
20 TABLET, FILM COATED ORAL AT BEDTIME
Refills: 0 | Status: DISCONTINUED | OUTPATIENT
Start: 2020-04-09 | End: 2020-04-23

## 2020-04-09 RX ORDER — HYDRALAZINE HCL 50 MG
50 TABLET ORAL DAILY
Refills: 0 | Status: DISCONTINUED | OUTPATIENT
Start: 2020-04-09 | End: 2020-04-23

## 2020-04-09 RX ORDER — FAMOTIDINE 10 MG/ML
20 INJECTION INTRAVENOUS DAILY
Refills: 0 | Status: DISCONTINUED | OUTPATIENT
Start: 2020-04-09 | End: 2020-04-23

## 2020-04-09 RX ORDER — INSULIN LISPRO 100/ML
VIAL (ML) SUBCUTANEOUS
Refills: 0 | Status: DISCONTINUED | OUTPATIENT
Start: 2020-04-09 | End: 2020-04-23

## 2020-04-09 RX ORDER — AMLODIPINE BESYLATE 2.5 MG/1
5 TABLET ORAL DAILY
Refills: 0 | Status: DISCONTINUED | OUTPATIENT
Start: 2020-04-09 | End: 2020-04-23

## 2020-04-09 RX ORDER — INSULIN LISPRO 100/ML
6 VIAL (ML) SUBCUTANEOUS
Refills: 0 | Status: DISCONTINUED | OUTPATIENT
Start: 2020-04-09 | End: 2020-04-23

## 2020-04-09 RX ORDER — FOLIC ACID 0.8 MG
1 TABLET ORAL
Qty: 0 | Refills: 0 | DISCHARGE

## 2020-04-09 RX ORDER — INSULIN LISPRO 100/ML
VIAL (ML) SUBCUTANEOUS AT BEDTIME
Refills: 0 | Status: DISCONTINUED | OUTPATIENT
Start: 2020-04-09 | End: 2020-04-23

## 2020-04-09 RX ORDER — GLUCAGON INJECTION, SOLUTION 0.5 MG/.1ML
1 INJECTION, SOLUTION SUBCUTANEOUS ONCE
Refills: 0 | Status: DISCONTINUED | OUTPATIENT
Start: 2020-04-09 | End: 2020-04-23

## 2020-04-09 RX ORDER — SODIUM CHLORIDE 9 MG/ML
1000 INJECTION INTRAMUSCULAR; INTRAVENOUS; SUBCUTANEOUS ONCE
Refills: 0 | Status: COMPLETED | OUTPATIENT
Start: 2020-04-09 | End: 2020-04-09

## 2020-04-09 RX ORDER — ALBUTEROL 90 UG/1
2 AEROSOL, METERED ORAL EVERY 6 HOURS
Refills: 0 | Status: COMPLETED | OUTPATIENT
Start: 2020-04-09 | End: 2020-04-11

## 2020-04-09 RX ORDER — FAMOTIDINE 10 MG/ML
1 INJECTION INTRAVENOUS
Qty: 0 | Refills: 0 | DISCHARGE

## 2020-04-09 RX ORDER — INSULIN GLARGINE 100 [IU]/ML
25 INJECTION, SOLUTION SUBCUTANEOUS AT BEDTIME
Refills: 0 | Status: DISCONTINUED | OUTPATIENT
Start: 2020-04-09 | End: 2020-04-23

## 2020-04-09 RX ORDER — SODIUM CHLORIDE 9 MG/ML
1 INJECTION INTRAMUSCULAR; INTRAVENOUS; SUBCUTANEOUS
Refills: 0 | Status: COMPLETED | OUTPATIENT
Start: 2020-04-09 | End: 2020-04-11

## 2020-04-09 RX ORDER — GLYBURIDE 5 MG
0 TABLET ORAL
Qty: 0 | Refills: 0 | DISCHARGE

## 2020-04-09 RX ORDER — INSULIN DETEMIR 100/ML (3)
0 INSULIN PEN (ML) SUBCUTANEOUS
Qty: 0 | Refills: 0 | DISCHARGE

## 2020-04-09 RX ORDER — DEXTROSE 50 % IN WATER 50 %
25 SYRINGE (ML) INTRAVENOUS ONCE
Refills: 0 | Status: DISCONTINUED | OUTPATIENT
Start: 2020-04-09 | End: 2020-04-23

## 2020-04-09 RX ORDER — DEXTROSE 50 % IN WATER 50 %
12.5 SYRINGE (ML) INTRAVENOUS ONCE
Refills: 0 | Status: DISCONTINUED | OUTPATIENT
Start: 2020-04-09 | End: 2020-04-23

## 2020-04-09 RX ORDER — SODIUM CHLORIDE 9 MG/ML
1000 INJECTION, SOLUTION INTRAVENOUS
Refills: 0 | Status: DISCONTINUED | OUTPATIENT
Start: 2020-04-09 | End: 2020-04-23

## 2020-04-09 RX ORDER — DEXTROSE 50 % IN WATER 50 %
15 SYRINGE (ML) INTRAVENOUS ONCE
Refills: 0 | Status: DISCONTINUED | OUTPATIENT
Start: 2020-04-09 | End: 2020-04-23

## 2020-04-09 RX ORDER — GLYBURIDE 5 MG
1 TABLET ORAL
Qty: 0 | Refills: 0 | DISCHARGE

## 2020-04-09 RX ORDER — ENOXAPARIN SODIUM 100 MG/ML
40 INJECTION SUBCUTANEOUS DAILY
Refills: 0 | Status: DISCONTINUED | OUTPATIENT
Start: 2020-04-09 | End: 2020-04-09

## 2020-04-09 RX ADMIN — Medication 6 UNIT(S): at 16:00

## 2020-04-09 RX ADMIN — Medication 1: at 16:00

## 2020-04-09 RX ADMIN — AMLODIPINE BESYLATE 5 MILLIGRAM(S): 2.5 TABLET ORAL at 05:34

## 2020-04-09 RX ADMIN — SODIUM CHLORIDE 500 MILLILITER(S): 9 INJECTION INTRAMUSCULAR; INTRAVENOUS; SUBCUTANEOUS at 04:15

## 2020-04-09 RX ADMIN — Medication 650 MILLIGRAM(S): at 16:00

## 2020-04-09 RX ADMIN — ENOXAPARIN SODIUM 40 MILLIGRAM(S): 100 INJECTION SUBCUTANEOUS at 11:49

## 2020-04-09 RX ADMIN — SIMVASTATIN 20 MILLIGRAM(S): 20 TABLET, FILM COATED ORAL at 21:58

## 2020-04-09 RX ADMIN — ALBUTEROL 2 PUFF(S): 90 AEROSOL, METERED ORAL at 21:57

## 2020-04-09 RX ADMIN — Medication 50 MILLIGRAM(S): at 05:34

## 2020-04-09 RX ADMIN — Medication 6 UNIT(S): at 08:20

## 2020-04-09 RX ADMIN — SODIUM CHLORIDE 1 GRAM(S): 9 INJECTION INTRAMUSCULAR; INTRAVENOUS; SUBCUTANEOUS at 21:58

## 2020-04-09 RX ADMIN — ALBUTEROL 2 PUFF(S): 90 AEROSOL, METERED ORAL at 05:33

## 2020-04-09 RX ADMIN — Medication 1: at 08:21

## 2020-04-09 RX ADMIN — Medication 3: at 11:50

## 2020-04-09 RX ADMIN — ALBUTEROL 2 PUFF(S): 90 AEROSOL, METERED ORAL at 11:54

## 2020-04-09 RX ADMIN — FAMOTIDINE 20 MILLIGRAM(S): 10 INJECTION INTRAVENOUS at 11:50

## 2020-04-09 RX ADMIN — Medication 6 UNIT(S): at 11:50

## 2020-04-09 RX ADMIN — ALBUTEROL 2 PUFF(S): 90 AEROSOL, METERED ORAL at 08:21

## 2020-04-09 RX ADMIN — INSULIN GLARGINE 25 UNIT(S): 100 INJECTION, SOLUTION SUBCUTANEOUS at 22:15

## 2020-04-09 NOTE — CONSULT NOTE ADULT - ASSESSMENT
In summary, this is a 69 year old Czech speaking woman with HTN, HLD, DM, prior CVA, prior ABI/BSO, RBBB who presents with COVID-19 viral pneumonia and found to have Mobitz II block.    It is possible this could be related to COVID-19 related myocarditis so we will obtain troponins. If related to COVID-19, there is no adequate data to guide us as to what the long term prognosis is. Therefore, at this time, we will treat as if this is not reversible and therefore patient would meet Class I indication for pacemaker. We will obtain TTE to asses LV function. Even if depressed, would favor implantation of a pacemaker over an ICD as she has an acute illness and it is unclear what her LV recovery would be. We discussed both transvenous or leadless pacemaker options.    The risks, benefits, and alternatives were discussed at length. The risks explained include, but are not limited to: pain, bleeding, infection, contrast reaction, acute kidney injury, radiation-induced skin irritation and/or burning, vascular injury and/or thrombosis, pneumothorax, cardiac perforation and/or tamponade, valvular injury, pocket hematoma, lead dislodgement, device embolization, device infection requiring extraction, lead fracture/de-insulation/failure, death, heart attack, or stroke. The opportunity to ask questions was provided and all were answered to the patient's satisfaction. The patient verbally consented to the procedure.    For now we will plan for leadless Micra AV pacemaker implantation. Since she ate today we will plan for implantation tomorrow.    Recommendations:  - STAT Cardiac enzymes  - TSH normal  - STAT Echo - use DEFINITY  - Pacemaker implantation tomorrow  - NPO after midnight  - Hold further Lovenox doses  - Treatment for COVID-19 pneumonia  - Avoid using HCQ until after pacemaker implantation as QT/QTc is prolonged    Thank you for this consult. We will follow with you.    Jason Montgomery MD  Clinical Cardiac Electrophysiology In summary, this is a 69 year old Bahamian speaking woman with HTN, HLD, DM, prior CVA, prior ABI/BSO, RBBB who presents with COVID-19 viral pneumonia and found to have Mobitz II block.    It is possible this could be related to COVID-19 related myocarditis so we will obtain troponins. If related to COVID-19, there is no adequate data to guide us as to what the long term prognosis is. Therefore, at this time, we will treat as if this is not reversible and therefore patient would meet Class I indication for pacemaker. We will obtain TTE to asses LV function. Even if depressed, would favor implantation of a pacemaker over an ICD as she has an acute illness and it is unclear what her LV recovery would be. We discussed both transvenous or leadless pacemaker options.    The risks, benefits, and alternatives were discussed at length. The risks explained include, but are not limited to: pain, bleeding, infection, contrast reaction, acute kidney injury, radiation-induced skin irritation and/or burning, vascular injury and/or thrombosis, pneumothorax, cardiac perforation and/or tamponade, valvular injury, pocket hematoma, lead dislodgement, device embolization, device infection requiring extraction, lead fracture/de-insulation/failure, death, heart attack, or stroke. The opportunity to ask questions was provided and all were answered to the patient's satisfaction. The patient verbally consented to the procedure.    For now we will plan for leadless Micra AV pacemaker implantation. Since she ate today we will plan for implantation tomorrow.    COVID-19 Viral Pneumonia  Mobitz II Block  Hyponatremia  Prolonged QT    Recommendations:  - STAT Cardiac enzymes  - TSH normal  - STAT Echo - use DEFINITY  - Pacemaker implantation tomorrow  - NPO after midnight  - Hold further Lovenox doses  - Treatment for COVID-19 pneumonia  - Avoid using HCQ until after pacemaker implantation as QT/QTc is prolonged  - Please correct sodium as much as possible    Thank you for this consult. We will follow with you.    Jason Montgomery MD  Clinical Cardiac Electrophysiology In summary, this is a 69 year old Micronesian speaking woman with HTN, HLD, DM, prior CVA, prior ABI/BSO, RBBB who presents with COVID-19 viral pneumonia and found to have Mobitz II block.    It is possible this could be related to COVID-19 related myocarditis so we will obtain troponins. If related to COVID-19, there is no adequate data to guide us as to what the long term prognosis is. Therefore, at this time, we will treat as if this is not reversible and therefore patient would meet Class I indication for pacemaker. We will obtain TTE to asses LV function. Even if depressed, would favor implantation of a pacemaker over an ICD as she has an acute illness and it is unclear what her LV recovery would be. We discussed both transvenous or leadless pacemaker options.    The risks, benefits, and alternatives were discussed at length. The risks explained include, but are not limited to: pain, bleeding, infection, contrast reaction, acute kidney injury, radiation-induced skin irritation and/or burning, vascular injury and/or thrombosis, pneumothorax, cardiac perforation and/or tamponade, valvular injury, pocket hematoma, lead dislodgement, device embolization, device infection requiring extraction, lead fracture/de-insulation/failure, death, heart attack, or stroke. The opportunity to ask questions was provided and all were answered to the patient's satisfaction. The patient verbally consented to the procedure.    For now we will plan for leadless Micra AV pacemaker implantation. Since she ate today we will plan for implantation tomorrow.    I spoke with the patient's son - Balaji Ko at [phone] to inform him of plans for pacemaker implantation. He was also agreeable to the procedure.    COVID-19 Viral Pneumonia  Mobitz II Block  Hyponatremia  Prolonged QT    Recommendations:  - STAT Cardiac enzymes  - TSH normal  - STAT Echo - use DEFINITY  - Pacemaker implantation tomorrow  - NPO after midnight  - Hold further Lovenox doses  - Treatment for COVID-19 pneumonia  - Avoid using HCQ until after pacemaker implantation as QT/QTc is prolonged  - Please correct sodium as much as possible    Thank you for this consult. We will follow with you.    Jason Montgomery MD  Clinical Cardiac Electrophysiology In summary, this is a 69 year old Saudi Arabian speaking woman with HTN, HLD, DM, prior CVA, prior ABI/BSO, RBBB who presents with COVID-19 viral pneumonia and found to have Mobitz II block.    It is possible this could be related to COVID-19 related myocarditis so we will obtain troponins. If related to COVID-19, there is no adequate data to guide us as to what the long term prognosis is. Therefore, at this time, we will treat as if this is not reversible and therefore patient would meet Class I indication for pacemaker. We will obtain TTE to asses LV function. Even if depressed, would favor implantation of a pacemaker over an ICD as she has an acute illness and it is unclear what her LV recovery would be. We discussed both transvenous or leadless pacemaker options.    The risks, benefits, and alternatives were discussed at length. The risks explained include, but are not limited to: pain, bleeding, infection, contrast reaction, acute kidney injury, radiation-induced skin irritation and/or burning, vascular injury and/or thrombosis, pneumothorax, cardiac perforation and/or tamponade, valvular injury, pocket hematoma, lead dislodgement, device embolization, device infection requiring extraction, lead fracture/de-insulation/failure, death, heart attack, or stroke. The opportunity to ask questions was provided and all were answered to the patient's satisfaction. The patient verbally consented to the procedure.    For now we will plan for leadless Micra AV pacemaker implantation. Since she ate today we will plan for implantation tomorrow.    I spoke with the patient's son - Balaji Ko at [phone] to inform him of plans for pacemaker implantation. He was also agreeable to the procedure.    COVID-19 Viral Pneumonia  Mobitz II Block  Hyponatremia  Prolonged QT    Recommendations:  - STAT Cardiac enzymes  - TSH normal  - STAT Echo - use DEFINITY  - Pacemaker implantation tomorrow  - NPO after midnight  - Hold further Lovenox doses  - Treatment for COVID-19 pneumonia  - Avoid using HCQ until after pacemaker implantation as QT/QTc is prolonged  - Please correct sodium as much as possible    Discussed with Dr. Ling.    Thank you for this consult. We will follow with you.    Jason Montgoemry MD  Clinical Cardiac Electrophysiology

## 2020-04-09 NOTE — PROGRESS NOTE ADULT - SUBJECTIVE AND OBJECTIVE BOX
Pt admitted with COVID pneumonia.  Still has cough and has bradycardia.    oe:  cvs;  s1, s2, bradycardia         rs:  bibasal crepts        gi:  soft, non tender        ext:  no cyanosis, clubbing, edema    A/P:  COVID - continue present mgmt - unable to give plaquenil due to bradycardia and QT prolongation          bradycardia - cardiology consult requested - [facility]          htn - controlled          dm - cont fs with coverage           hyperlipidemia - continue present mgmt

## 2020-04-09 NOTE — CONSULT NOTE ADULT - SUBJECTIVE AND OBJECTIVE BOX
69 year old Tajik speaking female patient with a known history of HTN, HLD, DM, prior CVA, prior ABI/BSO who is presenting with 5 days history of subjective fever and chills, dry cough, pleuritic chest pain and dyspnea at rest and on exertion. Patient also endorsing some nausea and vomiting. Reports she was tested for COVID-19 at a testing center somewhere in  along with her  recently.  Her  tested positive and she tested negative however her  didn’t self-quarantine and they slept in the same bed together.     Patient is currently admitted hypoxemia, hyponatremia associated with bilateral COVID-19 PNA along with telemetry evidence of 2:1 AV block in setting of baseline RBBB (from at least 2017)    PAST MEDICAL & SURGICAL HISTORY:  H/O gastroesophageal reflux (GERD)  HTN (hypertension)  DM (diabetes mellitus)  History of benign brain tumor    REVIEW OF SYSTEMS  General:	  Skin/Breast:	  Ophthalmologic:	  ENMT:	  Respiratory and Thorax:	  Cardiovascular:	  Gastrointestinal:	  Genitourinary:	  Musculoskeletal:	  Neurological:	  Psychiatric:	  Hematology/Lymphatics:	  Endocrine:	  Allergic/Immunologic:	    MEDICATIONS  (STANDING):  ALBUTerol    90 MICROgram(s) HFA Inhaler 2 Puff(s) Inhalation every 6 hours  amLODIPine   Tablet 5 milliGRAM(s) Oral daily  dextrose 5%. 1000 milliLiter(s) (50 mL/Hr) IV Continuous <Continuous>  dextrose 50% Injectable 12.5 Gram(s) IV Push once  dextrose 50% Injectable 25 Gram(s) IV Push once  dextrose 50% Injectable 25 Gram(s) IV Push once  enoxaparin Injectable 40 milliGRAM(s) SubCutaneous daily  famotidine    Tablet 20 milliGRAM(s) Oral daily  hydrALAZINE 50 milliGRAM(s) Oral daily  insulin glargine Injectable (LANTUS) 25 Unit(s) SubCutaneous at bedtime  insulin lispro (HumaLOG) corrective regimen sliding scale   SubCutaneous three times a day before meals  insulin lispro (HumaLOG) corrective regimen sliding scale   SubCutaneous at bedtime  insulin lispro Injectable (HumaLOG) 6 Unit(s) SubCutaneous three times a day before meals  simvastatin 20 milliGRAM(s) Oral at bedtime    MEDICATIONS  (PRN):  acetaminophen   Tablet .. 650 milliGRAM(s) Oral every 6 hours PRN Temp greater or equal to 38C (100.4F), Mild Pain (1 - 3), Moderate Pain (4 - 6)  dextrose 40% Gel 15 Gram(s) Oral once PRN Blood Glucose LESS THAN 70 milliGRAM(s)/deciliter  glucagon  Injectable 1 milliGRAM(s) IntraMuscular once PRN Glucose LESS THAN 70 milligrams/deciliter    Allergies  No Known Allergies    SOCIAL HISTORY:    FAMILY HISTORY:  FH: asthma: Son    Vital Signs Last 24 Hrs  T(C): 37.2 (2020 05:29), Max: 39.4 (2020 18:12)  T(F): 99 (2020 05:29), Max: 102.9 (2020 18:12)  HR: 49 (2020 08:22) (42 - 77)  BP: 159/67 (2020 08:22) (128/60 - 171/63)  RR: 20 (2020 08:22) (18 - 25)  SpO2: 91% (2020 08:22) (91% - 98%)    Physical Exam:  Constitutional: AAOx3, NAD  Neck: supple, No JVD  Cardiovascular: +S1S2 RRR, no murmurs, rubs, gallops   Pulmonary: CTA b/l, unlabored, no wheezes, rales. rhonci  Abdomen: +BS, soft NTND  Extremities: no edema b/l, +distal pulses b/l  Neuro: non focal, speech clear, OSPINA x 4    LABS:                        12.8   7.30  )-----------( 273      ( 2020 19:49 )             36.0     126<L>  |  86<L>  |  16.0  ----------------------------<  161<H>  3.8   |  24.0  |  0.77  Ca    8.2<L>      2020 07:01  TPro  6.8  /  Alb  3.6  /  TBili  0.5  /  DBili  x   /  AST  48<H>  /  ALT  26  /  AlkPhos  67  04-08  LIVER FUNCTIONS - ( 2020 19:49 )  Alb: 3.6 g/dL / Pro: 6.8 g/dL / ALK PHOS: 67 U/L / ALT: 26 U/L / AST: 48 U/L / GGT: x           RADIOLOGY & ADDITIONAL STUDIES:  CXR 2020  The cardiac silhouette is within normal limits. Patchy bilateral airspace disease. No pleural abnormality.  IMPRESSION: Bilateral airspace disease compatible with atypical/viral pneumonia.    EKG 2017:  NSR at 113bpm; QRSD 130ms; RBBB  EKG 12/3/2019  NSR at 75bpm; QRSD 140ms; RBBB  EK2020  2:1 AV block at 45bpm; QRSD 130ms RBBB at 7:25AM  Telemetry: NSR predominantly. 2:1 AV block recorded throughout hospitalization. QTc measured manually 499ms.     A/P  69 year old Tajik speaking female patient with a known history of HTN, HLD, DM, prior CVA, prior ABI/BSO who currently admitted hypoxemia, hyponatremia associated with bilateral COVID-19 PNA along with telemetry evidence of Mobitz II AV block with baseline RBBB (from at least 2017)    - STAT Cardiac enzymes  - TSH normal  - STAT Echo - use DEFINITY  - Patient will likely need pacemaker. Will discuss options: Micra vs traditional dual chamber ppm [place] CARDIAC ELECTROPHYSIOLOGY                                                       [facility] [place]                                                      Office: [address] [facility], [facility]                                                       Telephone: [phone]. Fax:[phone]    HPI:  69 year old Thai speaking female with HTN, HLD, DM, prior CVA, prior ABI/BSO who presented with a 5 day history of subjective fever, chills, dry cough, pleuritic chest pain and dyspnea at rest and on exertion. she also had some nausea and vomiting yesterday. She also noted having dizziness and lightheadedness with ambulation and walking but no syncope. She was tested for COVID-19 at a testing center somewhere in  along with her  recently.  Her  tested positive and she tested negative however her  didn’t self-quarantine and they slept in the same bed together. She was taking care of him for the past 3 weeks and only recently developed symptoms.    In the ER the patient's CXR was consistent with viral pneumonia. She also presented with hypoxemia requiring nasal cannula oxygen. ECG on arrival showed 2:1 AVB with baseline RBBB and telemetry demonstrated Mobitz II block.    PAST MEDICAL & SURGICAL HISTORY:  H/O gastroesophageal reflux (GERD)  HTN (hypertension)  DM (diabetes mellitus)  History of benign brain tumor    REVIEW OF SYSTEMS: As per HPI. Remaining 10 point ROS were reviewed and are negative.    MEDICATIONS  (STANDING):  ALBUTerol    90 MICROgram(s) HFA Inhaler 2 Puff(s) Inhalation every 6 hours  amLODIPine   Tablet 5 milliGRAM(s) Oral daily  dextrose 5%. 1000 milliLiter(s) (50 mL/Hr) IV Continuous <Continuous>  dextrose 50% Injectable 12.5 Gram(s) IV Push once  dextrose 50% Injectable 25 Gram(s) IV Push once  dextrose 50% Injectable 25 Gram(s) IV Push once  enoxaparin Injectable 40 milliGRAM(s) SubCutaneous daily  famotidine    Tablet 20 milliGRAM(s) Oral daily  hydrALAZINE 50 milliGRAM(s) Oral daily  insulin glargine Injectable (LANTUS) 25 Unit(s) SubCutaneous at bedtime  insulin lispro (HumaLOG) corrective regimen sliding scale   SubCutaneous three times a day before meals  insulin lispro (HumaLOG) corrective regimen sliding scale   SubCutaneous at bedtime  insulin lispro Injectable (HumaLOG) 6 Unit(s) SubCutaneous three times a day before meals  simvastatin 20 milliGRAM(s) Oral at bedtime    MEDICATIONS  (PRN):  acetaminophen   Tablet .. 650 milliGRAM(s) Oral every 6 hours PRN Temp greater or equal to 38C (100.4F), Mild Pain (1 - 3), Moderate Pain (4 - 6)  dextrose 40% Gel 15 Gram(s) Oral once PRN Blood Glucose LESS THAN 70 milliGRAM(s)/deciliter  glucagon  Injectable 1 milliGRAM(s) IntraMuscular once PRN Glucose LESS THAN 70 milligrams/deciliter    Allergies  No Known Allergies    SOCIAL HISTORY:  Denies smoking, EtOH, illicit drug use.    FAMILY HISTORY:  FH: asthma: Son  Father with MI and stroke, passed at age 69    Vital Signs Last 24 Hrs  T(C): 37.2 (2020 05:29), Max: 39.4 (2020 18:12)  T(F): 99 (2020 05:29), Max: 102.9 (2020 18:12)  HR: 49 (2020 08:22) (42 - 77)  BP: 159/67 (2020 08:22) (128/60 - 171/63)  RR: 20 (2020 08:22) (18 - 25)  SpO2: 91% (2020 08:22) (91% - 98%)    Physical Exam: Majority of physical exam deferred (please see referenced physical exam below)  Constitutional: AAOx3, NAD, occasional cough  Neck: supple, No JVD  Pulmonary: Unlabored, mildly tachypneic  Abdomen: +BS, soft NTND  Extremities: no edema b/l, +distal pulses b/l  Neuro: non focal, speech clear, OSPINA x 4    LABS:                        12.8   7.30  )-----------( 273      ( 2020 19:49 )             36.0     126<L>  |  86<L>  |  16.0  ----------------------------<  161<H>  3.8   |  24.0  |  0.77  Ca    8.2<L>      2020 07:01  TPro  6.8  /  Alb  3.6  /  TBili  0.5  /  DBili  x   /  AST  48<H>  /  ALT  26  /  AlkPhos  67  04-08  LIVER FUNCTIONS - ( 2020 19:49 )  Alb: 3.6 g/dL / Pro: 6.8 g/dL / ALK PHOS: 67 U/L / ALT: 26 U/L / AST: 48 U/L / GGT: x           RADIOLOGY & ADDITIONAL STUDIES:  CXR 2020  The cardiac silhouette is within normal limits. Patchy bilateral airspace disease. No pleural abnormality.  IMPRESSION: Bilateral airspace disease compatible with atypical/viral pneumonia.    EKG 2017: NSR at 113bpm; QRSD 130ms; RBBB  EKG 12/3/2019: NSR at 75bpm; QRSD 140ms; RBBB  EK2020: 2:1 AV block at 45bpm; QRSD 130ms RBBB at 7:25AM    Telemetry: NSR predominantly. 2:1 AV block recorded throughout hospitalization. QTc measured manually 499ms. Occasional resumption of 1:1 conduction which occurs when sinus rates slow down, NM unchanged consistent with Mobitz II block.    A/P:  In summary, this is a 69 year old Thai speaking woman with HTN, HLD, DM, prior CVA, prior ABI/BSO, RBBB who presents with COVID-19 viral pneumonia and found to have Mobitz II block.    It is possible this could be related to COVID-19 related myocarditis so we will obtain troponins. If related to COVID-19, there is no adequate data to guide us as to what the long term prognosis is. Therefore, at this time, we will treat as if this is not reversible and therefore patient would meet Class I indication for pacemaker. We will obtain TTE to asses LV function. Even if depressed, would favor implantation of a pacemaker over an ICD as she has an acute illness and it is unclear what her LV recovery would be. We discussed both transvenous or leadless pacemaker options.    The risks, benefits, and alternatives were discussed at length. The risks explained include, but are not limited to: pain, bleeding, infection, contrast reaction, acute kidney injury, radiation-induced skin irritation and/or burning, vascular injury and/or thrombosis, pneumothorax, cardiac perforation and/or tamponade, valvular injury, pocket hematoma, lead dislodgement, device embolization, device infection requiring extraction, lead fracture/de-insulation/failure, death, heart attack, or stroke. The opportunity to ask questions was provided and all were answered to the patient's satisfaction. The patient verbally consented to the procedure.    For now we will plan for leadless Micra AV pacemaker implantation. Since she ate today we will plan for implantation tomorrow.    Recommendations:  - STAT Cardiac enzymes  - TSH normal  - STAT Echo - use DEFINITY  - Pacemaker implantation tomorrow  - NPO after midnight  - Hold further Lovenox doses  - Treatment for COVID-19 pneumonia  - Avoid using HCQ until after pacemaker implantation as QT/QTc is prolonged    Thank you for this consult. We will follow with you.    Jason Montgomery MD  Clinical Cardiac Electrophysiology [place] CARDIAC ELECTROPHYSIOLOGY                                                       [facility] [place]                                                      Office: [address] [facility], [facility]                                                       Telephone: [phone]. Fax:[phone]    HPI:  69 year old Scottish speaking female with HTN, HLD, DM, prior CVA, prior ABI/BSO who presented with a 5 day history of subjective fever, chills, dry cough, pleuritic chest pain and dyspnea at rest and on exertion. she also had some nausea and vomiting yesterday. She also noted having dizziness and lightheadedness with ambulation and walking but no syncope. She was tested for COVID-19 at a testing center somewhere in  along with her  recently.  Her  tested positive and she tested negative however her  didn’t self-quarantine and they slept in the same bed together. She was taking care of him for the past 3 weeks and only recently developed symptoms.    In the ER the patient's CXR was consistent with viral pneumonia. She also presented with hypoxemia requiring nasal cannula oxygen. ECG on arrival showed 2:1 AVB with baseline RBBB and telemetry demonstrated Mobitz II block.    PAST MEDICAL & SURGICAL HISTORY:  H/O gastroesophageal reflux (GERD)  HTN (hypertension)  DM (diabetes mellitus)  History of benign brain tumor    REVIEW OF SYSTEMS: As per HPI. Remaining 10 point ROS were reviewed and are negative.    MEDICATIONS  (STANDING):  ALBUTerol    90 MICROgram(s) HFA Inhaler 2 Puff(s) Inhalation every 6 hours  amLODIPine   Tablet 5 milliGRAM(s) Oral daily  dextrose 5%. 1000 milliLiter(s) (50 mL/Hr) IV Continuous <Continuous>  dextrose 50% Injectable 12.5 Gram(s) IV Push once  dextrose 50% Injectable 25 Gram(s) IV Push once  dextrose 50% Injectable 25 Gram(s) IV Push once  enoxaparin Injectable 40 milliGRAM(s) SubCutaneous daily  famotidine    Tablet 20 milliGRAM(s) Oral daily  hydrALAZINE 50 milliGRAM(s) Oral daily  insulin glargine Injectable (LANTUS) 25 Unit(s) SubCutaneous at bedtime  insulin lispro (HumaLOG) corrective regimen sliding scale   SubCutaneous three times a day before meals  insulin lispro (HumaLOG) corrective regimen sliding scale   SubCutaneous at bedtime  insulin lispro Injectable (HumaLOG) 6 Unit(s) SubCutaneous three times a day before meals  simvastatin 20 milliGRAM(s) Oral at bedtime    MEDICATIONS  (PRN):  acetaminophen   Tablet .. 650 milliGRAM(s) Oral every 6 hours PRN Temp greater or equal to 38C (100.4F), Mild Pain (1 - 3), Moderate Pain (4 - 6)  dextrose 40% Gel 15 Gram(s) Oral once PRN Blood Glucose LESS THAN 70 milliGRAM(s)/deciliter  glucagon  Injectable 1 milliGRAM(s) IntraMuscular once PRN Glucose LESS THAN 70 milligrams/deciliter    Allergies  No Known Allergies    SOCIAL HISTORY:  Denies smoking, EtOH, illicit drug use.    FAMILY HISTORY:  FH: asthma: Son  Father with MI and stroke, passed at age 69    Vital Signs Last 24 Hrs  T(C): 37.2 (2020 05:29), Max: 39.4 (2020 18:12)  T(F): 99 (2020 05:29), Max: 102.9 (2020 18:12)  HR: 49 (2020 08:22) (42 - 77)  BP: 159/67 (2020 08:22) (128/60 - 171/63)  RR: 20 (2020 08:22) (18 - 25)  SpO2: 91% (2020 08:22) (91% - 98%)    Physical Exam: Majority of physical exam deferred (please see referenced physical exam below)  Constitutional: AAOx3, NAD, occasional cough  Neck: supple, No JVD  Pulmonary: Unlabored, mildly tachypneic  Abdomen: +BS, soft NTND  Extremities: no edema b/l, +distal pulses b/l  Neuro: non focal, speech clear, OSPINA x 4    LABS:                        12.8   7.30  )-----------( 273      ( 2020 19:49 )             36.0     126<L>  |  86<L>  |  16.0  ----------------------------<  161<H>  3.8   |  24.0  |  0.77  Ca    8.2<L>      2020 07:01  TPro  6.8  /  Alb  3.6  /  TBili  0.5  /  DBili  x   /  AST  48<H>  /  ALT  26  /  AlkPhos  67  04-08  LIVER FUNCTIONS - ( 2020 19:49 )  Alb: 3.6 g/dL / Pro: 6.8 g/dL / ALK PHOS: 67 U/L / ALT: 26 U/L / AST: 48 U/L / GGT: x           RADIOLOGY & ADDITIONAL STUDIES:  CXR 2020  The cardiac silhouette is within normal limits. Patchy bilateral airspace disease. No pleural abnormality.  IMPRESSION: Bilateral airspace disease compatible with atypical/viral pneumonia.    EKG 2017: NSR at 113bpm; QRSD 130ms; RBBB  EKG 12/3/2019: NSR at 75bpm; QRSD 140ms; RBBB  EK2020: 2:1 AV block at 45bpm; QRSD 130ms RBBB at 7:25AM    Telemetry: NSR predominantly. 2:1 AV block recorded throughout hospitalization. QTc measured manually 499ms. Occasional resumption of 1:1 conduction which occurs when sinus rates slow down, RI unchanged consistent with Mobitz II block.

## 2020-04-10 ENCOUNTER — TRANSCRIPTION ENCOUNTER (OUTPATIENT)
Age: 70
End: 2020-04-10

## 2020-04-10 LAB
ABO RH CONFIRMATION: SIGNIFICANT CHANGE UP
ALBUMIN SERPL ELPH-MCNC: 3 G/DL — LOW (ref 3.3–5.2)
ALP SERPL-CCNC: 67 U/L — SIGNIFICANT CHANGE UP (ref 40–120)
ALT FLD-CCNC: 26 U/L — SIGNIFICANT CHANGE UP
ANION GAP SERPL CALC-SCNC: 13 MMOL/L — SIGNIFICANT CHANGE UP (ref 5–17)
APTT BLD: 32 SEC — SIGNIFICANT CHANGE UP (ref 27.5–36.3)
AST SERPL-CCNC: 57 U/L — HIGH
BILIRUB SERPL-MCNC: 0.4 MG/DL — SIGNIFICANT CHANGE UP (ref 0.4–2)
BLD GP AB SCN SERPL QL: SIGNIFICANT CHANGE UP
BUN SERPL-MCNC: 17 MG/DL — SIGNIFICANT CHANGE UP (ref 8–20)
CALCIUM SERPL-MCNC: 7.9 MG/DL — LOW (ref 8.6–10.2)
CHLORIDE SERPL-SCNC: 88 MMOL/L — LOW (ref 98–107)
CO2 SERPL-SCNC: 27 MMOL/L — SIGNIFICANT CHANGE UP (ref 22–29)
CREAT ?TM UR-MCNC: 91 MG/DL — SIGNIFICANT CHANGE UP
CREAT SERPL-MCNC: 0.98 MG/DL — SIGNIFICANT CHANGE UP (ref 0.5–1.3)
GLUCOSE BLDC GLUCOMTR-MCNC: 205 MG/DL — HIGH (ref 70–99)
GLUCOSE BLDC GLUCOMTR-MCNC: 231 MG/DL — HIGH (ref 70–99)
GLUCOSE BLDC GLUCOMTR-MCNC: 238 MG/DL — HIGH (ref 70–99)
GLUCOSE BLDC GLUCOMTR-MCNC: 275 MG/DL — HIGH (ref 70–99)
GLUCOSE SERPL-MCNC: 228 MG/DL — HIGH (ref 70–99)
HCT VFR BLD CALC: 34 % — LOW (ref 34.5–45)
HCV AB S/CO SERPL IA: 0.08 S/CO — SIGNIFICANT CHANGE UP (ref 0–0.99)
HCV AB SERPL-IMP: SIGNIFICANT CHANGE UP
HGB BLD-MCNC: 12.1 G/DL — SIGNIFICANT CHANGE UP (ref 11.5–15.5)
INR BLD: 1.19 RATIO — HIGH (ref 0.88–1.16)
MAGNESIUM SERPL-MCNC: 2 MG/DL — SIGNIFICANT CHANGE UP (ref 1.6–2.6)
MCHC RBC-ENTMCNC: 29 PG — SIGNIFICANT CHANGE UP (ref 27–34)
MCHC RBC-ENTMCNC: 35.6 GM/DL — SIGNIFICANT CHANGE UP (ref 32–36)
MCV RBC AUTO: 81.5 FL — SIGNIFICANT CHANGE UP (ref 80–100)
OSMOLALITY UR: 394 MOSM/KG — SIGNIFICANT CHANGE UP (ref 300–1000)
PLATELET # BLD AUTO: 297 K/UL — SIGNIFICANT CHANGE UP (ref 150–400)
POTASSIUM SERPL-MCNC: 3.2 MMOL/L — LOW (ref 3.5–5.3)
POTASSIUM SERPL-SCNC: 3.2 MMOL/L — LOW (ref 3.5–5.3)
PROT SERPL-MCNC: 6 G/DL — LOW (ref 6.6–8.7)
PROTHROM AB SERPL-ACNC: 13.5 SEC — HIGH (ref 10–12.9)
RBC # BLD: 4.17 M/UL — SIGNIFICANT CHANGE UP (ref 3.8–5.2)
RBC # FLD: 13 % — SIGNIFICANT CHANGE UP (ref 10.3–14.5)
SODIUM SERPL-SCNC: 128 MMOL/L — LOW (ref 135–145)
SODIUM UR-SCNC: <30 MMOL/L — SIGNIFICANT CHANGE UP
WBC # BLD: 5.77 K/UL — SIGNIFICANT CHANGE UP (ref 3.8–10.5)
WBC # FLD AUTO: 5.77 K/UL — SIGNIFICANT CHANGE UP (ref 3.8–10.5)

## 2020-04-10 PROCEDURE — 93010 ELECTROCARDIOGRAM REPORT: CPT

## 2020-04-10 PROCEDURE — 33274 TCAT INSJ/RPL PERM LDLS PM: CPT | Mod: Q0

## 2020-04-10 PROCEDURE — 99233 SBSQ HOSP IP/OBS HIGH 50: CPT | Mod: 25

## 2020-04-10 RX ORDER — CEFAZOLIN SODIUM 1 G
2000 VIAL (EA) INJECTION
Refills: 0 | Status: COMPLETED | OUTPATIENT
Start: 2020-04-10 | End: 2020-04-11

## 2020-04-10 RX ORDER — POTASSIUM CHLORIDE 20 MEQ
40 PACKET (EA) ORAL EVERY 4 HOURS
Refills: 0 | Status: COMPLETED | OUTPATIENT
Start: 2020-04-10 | End: 2020-04-10

## 2020-04-10 RX ORDER — SODIUM CHLORIDE 9 MG/ML
500 INJECTION INTRAMUSCULAR; INTRAVENOUS; SUBCUTANEOUS
Refills: 0 | Status: DISCONTINUED | OUTPATIENT
Start: 2020-04-10 | End: 2020-04-23

## 2020-04-10 RX ADMIN — Medication 40 MILLIEQUIVALENT(S): at 17:44

## 2020-04-10 RX ADMIN — SIMVASTATIN 20 MILLIGRAM(S): 20 TABLET, FILM COATED ORAL at 23:17

## 2020-04-10 RX ADMIN — Medication 50 MILLIGRAM(S): at 05:00

## 2020-04-10 RX ADMIN — ALBUTEROL 2 PUFF(S): 90 AEROSOL, METERED ORAL at 17:45

## 2020-04-10 RX ADMIN — Medication 6 UNIT(S): at 17:56

## 2020-04-10 RX ADMIN — AMLODIPINE BESYLATE 5 MILLIGRAM(S): 2.5 TABLET ORAL at 05:00

## 2020-04-10 RX ADMIN — INSULIN GLARGINE 25 UNIT(S): 100 INJECTION, SOLUTION SUBCUTANEOUS at 23:17

## 2020-04-10 RX ADMIN — Medication 100 MILLIGRAM(S): at 17:44

## 2020-04-10 RX ADMIN — Medication 650 MILLIGRAM(S): at 05:01

## 2020-04-10 RX ADMIN — Medication 3: at 17:56

## 2020-04-10 RX ADMIN — ALBUTEROL 2 PUFF(S): 90 AEROSOL, METERED ORAL at 23:18

## 2020-04-10 RX ADMIN — Medication 650 MILLIGRAM(S): at 17:45

## 2020-04-10 RX ADMIN — ALBUTEROL 2 PUFF(S): 90 AEROSOL, METERED ORAL at 02:55

## 2020-04-10 RX ADMIN — SODIUM CHLORIDE 1 GRAM(S): 9 INJECTION INTRAMUSCULAR; INTRAVENOUS; SUBCUTANEOUS at 05:00

## 2020-04-10 RX ADMIN — FAMOTIDINE 20 MILLIGRAM(S): 10 INJECTION INTRAVENOUS at 17:45

## 2020-04-10 RX ADMIN — SODIUM CHLORIDE 1 GRAM(S): 9 INJECTION INTRAMUSCULAR; INTRAVENOUS; SUBCUTANEOUS at 17:44

## 2020-04-10 RX ADMIN — Medication 40 MILLIEQUIVALENT(S): at 23:17

## 2020-04-10 NOTE — PROGRESS NOTE ADULT - SUBJECTIVE AND OBJECTIVE BOX
Pt is s/p pacemaker placement for heart block.  Tolerated procedure well.      oe:  in cath lab    a/p:  COVID pneumonia - cont present mgmt          htn - controlled          hyponatremia - continue iv fluids          hypokalemia - replete potassium

## 2020-04-10 NOTE — CHART NOTE - NSCHARTNOTEFT_GEN_A_CORE
Patient noted on telemetry to have episodes of atrial undersensing. Device interrogation performed.   Mode reprogrammed from VDD 50 to VVI 60.   Mode optimization may be performed as outpatient and when clinically optimized.

## 2020-04-10 NOTE — DISCHARGE NOTE PROVIDER - NSDCFUADDINST_GEN_ALL_CORE_FT
Follow up with Dr. Montgomery in two weeks time. The office will call you in 3-5 days for an appointment.

## 2020-04-10 NOTE — DISCHARGE NOTE PROVIDER - REASON FOR ADMISSION
Acute hypoxemic respiratory failure due to suspected COVID PNA as well as Mobitz II AV block Acute hypoxemic respiratory failure due to suspected COVID PNA

## 2020-04-10 NOTE — DISCHARGE NOTE PROVIDER - HOSPITAL COURSE
69yoF hx HTN, HLD, DM, GERD prior CVA, prior ABI/BSO, RBBB presented to ED on 4/8/20 with 5 days history of subjective fever and chills, dry cough, pleuritic chest pain and dyspnea at rest and on exertion.  Pt also reported nausea/vomiting, dizziness/lightheadedness w ambulation. Pt reports she was tested for COVID at a testing center somewhere in  along with her  recently.  Her  tested positive and she tested negative however her  didn’t self-quarantine and they slept in the same bed together. In ED pt found to have Covid 19 viral PNA and found to have Mobitz II block.     EP was consulted and now pt is s/p successful [employer]onic Micra AV+ leadless pacemaker implantation via right femoral vein on 4/10/20.     On 4/16/20 pt was found to have acute aphasia and right sided weakness today. Likely last known well was at 0730 when the nurse made initial assessment and said she was speaking, but full evaluation not performed.  At about 1055 she was noted to have her symptoms of aphasia and right sided weakness.  She had a head CT done which showed left frontal/parietal CVA.  There was a delay in getting her CTA/P due to difficulty finding a vein for a line of adequate size for CT-A.  Once done and reviewed, there was ICU stenosis, likely from atherosclerosis, narrow but not occluded.  CTP was further delayed because of difficulty sending images to [facility] to be processed.  Once these images were obtained it showed a small almost entirely completed stroke.  It was decided not to intervene due to the fact that there was no significant penumbra seen on CTP.  Alteplase was not given due to the timing not being clear, that there was a completed stroke on head CT.     Pt was followed by neurology, PT and P, M & R. Accepted to Acute Rehab. CVA likely due to hypercoagulable state secondary to Covid-19.  Hypercoagulable workup sent and resulted. Protein C/S pending.    Continue Lovenox 1mg/kg bid- this will also provide DVT prophylaxis.        Pt was at first max assist w right sided weakness and completely aphasic, with a few days of PT, rehab has improved greatly to 1 person assist, as outlined in P,M and R progress note. Pt has  regained speech and some strength and mobility RUE.  Pt wants to improve and can tolerate acute rehab.         Vital Signs Last 24 Hrs    T(C): 36.4 (22 Apr 2020 08:53), Max: 37.1 (21 Apr 2020 20:29)    T(F): 97.6 (22 Apr 2020 08:53), Max: 98.7 (21 Apr 2020 20:29)    HR: 89 (22 Apr 2020 08:53) (89 - 102)    BP: 151/75 (22 Apr 2020 08:53) (132/62 - 164/75)    BP(mean): --    RR: 20 (22 Apr 2020 08:53) (18 - 24)    SpO2: 98% (22 Apr 2020 08:53) (97% - 98%)            General: NAD, sitting up in bed, well groomed in nightgown    Eyes: PERRLA, EOM intact    Neck: Supple and symmetrical, no JVD    CV: RRR, no m/r/g    Lungs: CTA b/l, no use of accessory muscles, no wheezes, rales, rhonchi    Skin: warm, dry, no rashes    Psych: normal mood/affect    Abdomen: Normal BS, soft, NT/ND    Rectal: performed earlier today    Extremities: no edema, cyanosis    Musculoskeletal: good strength b/l UE/LE, normal ROM, no joint swelling    Neuro: A & O X 2, right facial droop, mildly slurred speech/expressive aphasia, right sided hemiparesis, RUE 1-2/5, LLE minimal movement, LUE/LLL 5/5.          Pt to be discharged to Acute Rehab for further treatment.

## 2020-04-10 NOTE — DISCHARGE NOTE PROVIDER - NSDCCPCAREPLAN_GEN_ALL_CORE_FT
PRINCIPAL DISCHARGE DIAGNOSIS  Diagnosis: AV block, Mobitz II  Assessment and Plan of Treatment:       SECONDARY DISCHARGE DIAGNOSES  Diagnosis: Suspected COVID-19 virus infection  Assessment and Plan of Treatment:     Diagnosis: SOB (shortness of breath)  Assessment and Plan of Treatment: PRINCIPAL DISCHARGE DIAGNOSIS  Diagnosis: AV block, Mobitz II  Assessment and Plan of Treatment: Follow up EP in two weeks.      SECONDARY DISCHARGE DIAGNOSES  Diagnosis: DM (diabetes mellitus)  Assessment and Plan of Treatment: HgA1c= 10. Take medications as directed. Cont sliding scale and further managemnet per medical staff at [facility]. Maintain a low sugar consistent carbohydrate diet. Follow up w your PMD for further management and repeat labs in 3 months.    Diagnosis: CVA (cerebrovascular accident)  Assessment and Plan of Treatment: Take medications as directed. Follow up neurology within  2 weeks. Further care at Acute Rehab.    Diagnosis: Pneumonia due to COVID-19 virus  Assessment and Plan of Treatment: Supplemental O2 as needed to keep O2 sat > 90    Diagnosis: Acute respiratory failure with hypoxia  Assessment and Plan of Treatment: Supplemental O2 as needed to keep O2 sat> 90    Diagnosis: Suspected COVID-19 virus infection  Assessment and Plan of Treatment:     Diagnosis: SOB (shortness of breath)  Assessment and Plan of Treatment:

## 2020-04-10 NOTE — PROGRESS NOTE ADULT - ATTENDING COMMENTS
I have personally seen, examined, and participated in the care of this patient. I have reviewed all pertinent clinical information, including history, physical exam, plan, and the PA/NP's note and agree except as noted below.    S/p successful leadless pacemaker implantation.  Currently patient tachycardic resulting in difficulty with AV synchrony so device reprogrammed to VVI at 60 bpm. Plan to change to VDD as an outpatient.    Jason Montgomery MD  Clinical Cardiac Electrophysiology

## 2020-04-10 NOTE — DISCHARGE NOTE PROVIDER - NSDCCPTREATMENT_GEN_ALL_CORE_FT
PRINCIPAL PROCEDURE  Procedure: Insertion, pacemaker, leadless  Findings and Treatment: - Bruising at the groin, sometimes extending down the leg, and/or a small lump under the skin at the groin access site is normal and will resolve within 2 – 3 weeks.   - Occasional skipped beats or palpitations that last for a few beats are common and generally resolve within 1-2 months.   - You make walk and take stairs at a regular pace.   - Do not perform any exercise more strenuous than walking for 1 week.   - Do not strain or lift heavy objects for 1 week.  - You may shower the day after the procedure.  - Do not soak in water (such as tub baths, hot tubs, swimming, etc.) for 1 week.   - You may resume all other activities the day after the procedure.  Call your doctor if:   - you notice bleeding, redness, drainage, swelling, increased tenderness or a hot sensation around the catheter insertion site.   - your temperature is greater than 100 degrees F for more than 24 hours.  - your rapid heart rhythm returns.  - you have any questions or concerns regarding the procedure.  If significant bleeding and/or a large lump (the size of a golf ball or bigger) occurs:  - Lie flat and apply continuous direct pressure just above the puncture site for at least 10 minutes  - If the issue resolves, notify your physician immediately.    - If the bleeding cannot be controlled, please seek immediate medical attention.  If you experience increased difficulty breathing or chest pain, or if you faint or have dizzy spells, please seek immediate medical attention.

## 2020-04-10 NOTE — PROGRESS NOTE ADULT - SUBJECTIVE AND OBJECTIVE BOX
ELECTROPHYSIOLOGY BRIEF POST-OP NOTE    I have personally seen and examined the patient in Cath lab recovery area spot 17    PRE-OP DIAGNOSIS: Mobitz II AV block    POST-OP DIAGNOSIS: same    PROCEDURE: Leadless pacemaker insertion via right femoral vein (single deployement)    Physician: Jason Montgomery MD  Assistant: None    ESTIMATED BLOOD LOSS: none    ANESTHESIA TYPE:  [   ]General Anesthesia  [ x ]Sedation  [   ]Local/Regional    CONDITION:  [  ]Critical  [  ]Serious  [ x ]Stable  [  ]Good    SPECIMENS REMOVED (if applicable): none    IMPLANT (if applicable): Medtronic Micra AV leadless pacemaker    Programmed: VDD     EKG: Pending    Vital Signs Last 24 Hrs  T(C): 37.7 (10 Apr 2020 06:44), Max: 39.7 (09 Apr 2020 15:54)  T(F): 99.9 (10 Apr 2020 06:44), Max: 103.5 (09 Apr 2020 15:54)  HR: 78 (10 Apr 2020 05:04) (50 - 78)  BP: 125/78 (10 Apr 2020 05:04) (125/78 - 156/76)  RR: 23 (10 Apr 2020 05:04) (20 - 25)  SpO2: 94% (10 Apr 2020 05:04) (75% - 94%)    Physical Exam: Limited exam. Patient with COVID-19  Extremities: no pedal edema, +distal pulses b/l  Right Groin: No hematoma. Figure 8 suture with dressing CDI. No hematoma or ecchymosis.     LABS:                        12.1   5.77  )-----------( 297      ( 10 Apr 2020 09:29 )             34.0     128<L>  |  88<L>  |  17.0  ----------------------------<  228<H>  3.2<L>   |  27.0  |  0.98  Ca    7.9<L>      10 Apr 2020 09:29  Mg     2.0     04-10  TPro  6.0<L>  /  Alb  3.0<L>  /  TBili  0.4  /  DBili  x   /  AST  57<H>  /  ALT  26  /  AlkPhos  67  04-10  PT/INR - ( 10 Apr 2020 09:29 )   PT: 13.5 sec;   INR: 1.19 ratio    PTT - ( 10 Apr 2020 09:29 )  PTT:32.0 sec    A/P  69 year old Luxembourgish speaking woman with HTN, HLD, DM, prior CVA, prior ABI/BSO, RBBB who presents with COVID-19 viral pneumonia and found to have Mobitz II block who is now s/p successful Medtronic Micra AV+ leadless pacemaker implantation via right femoral vein.     -Bedrest x 6 hours  -IV hydration: NS at 100cc/hr x 2 hours.  -Ancef 2gram IV Q8hr x 2 more doses  -Chest X-ray PA/AP in AM to evaluate device positioning   -NO LOVENOX OR HEPARIN INCLUDING SQ UNLESS CLEARED BY EP  -AM labs and EKG  -Medtronic check in AM  -Figure 8 suture in right groin to be removed by EP/Cath lab ACP  -Follow up will be arranged with Dr. Montgomery in two weeks time  -Treat COVID-PNA - May use HCQ now that pacemaker in place  -Correct electrolytes  -Will follow along.

## 2020-04-10 NOTE — DISCHARGE NOTE PROVIDER - PROVIDER TOKENS
PROVIDER:[TOKEN:[[id number]],FOLLOWUP:[2 weeks],ESTABLISHEDPATIENT:[T]] PROVIDER:[TOKEN:[[id number]],FOLLOWUP:[2 weeks],ESTABLISHEDPATIENT:[T]],PROVIDER:[TOKEN:[[id number]]]

## 2020-04-10 NOTE — DISCHARGE NOTE PROVIDER - CARE PROVIDER_API CALL
Jason Montgomery)  Cardiology; Internal Medicine  [address] [place] [address], [place] [address]  Phone: [phone]  Fax: [phone]  Established Patient  Follow Up Time: 2 weeks Jason Montgomery)  Cardiology; Internal Medicine  [address] [facility], [address]  [place] [address]  Phone: [phone]  Fax: [phone]  Established Patient  Follow Up Time: 2 weeks    Idris White)  Neurology  [address] [facility], [address]  [place] [address]  Phone: [phone]  Fax: [phone]  Follow Up Time:

## 2020-04-10 NOTE — DISCHARGE NOTE PROVIDER - CARE PROVIDERS DIRECT ADDRESSES
,DirectAddress_Unknown ,DirectAddress_Unknown,[email or web address]

## 2020-04-10 NOTE — DISCHARGE NOTE PROVIDER - NSDCMRMEDTOKEN_GEN_ALL_CORE_FT
amLODIPine 5 mg oral tablet: 1 tab(s) orally once a day  famotidine 20 mg oral tablet: 1 tab(s) orally once a day  glipiZIDE 5 mg oral tablet: 1 tab(s) orally once a day  hydrALAZINE 50 mg oral tablet: 1 tab(s) orally once a day  hydroCHLOROthiazide 25 mg oral tablet: 1 tab(s) orally once a day  NovoLIN L 100 units/mL subcutaneous suspension: 50units twice daily  simvastatin 20 mg oral tablet: 1 tab(s) orally once a day (at bedtime) acetaminophen 325 mg oral tablet: 2 tab(s) orally every 6 hours, As needed, Temp greater or equal to 38C (100.4F), Mild Pain (1 - 3), Moderate Pain (4 - 6)  amLODIPine 5 mg oral tablet: 1 tab(s) orally once a day  aspirin 81 mg oral delayed release tablet: 1 tab(s) orally once a day  enoxaparin: 70 milligram(s) subcutaneous 2 times a day  famotidine 20 mg oral tablet: 1 tab(s) orally once a day  FLUoxetine 10 mg oral capsule: 1 cap(s) orally once a day  hydrALAZINE 50 mg oral tablet: 1 tab(s) orally once a day  hydroCHLOROthiazide 25 mg oral tablet: 1 tab(s) orally once a day  insulin glargine: 25 unit(s) subcutaneous once a day (at bedtime)  melatonin 5 mg oral tablet: 1 tab(s) orally once a day (at bedtime)  metoprolol tartrate 25 mg oral tablet: 1 tab(s) orally every 12 hours  NovoLIN L 100 units/mL subcutaneous suspension: 6 unit(s) subcutaneous 3 times a day (before meals)  simvastatin 20 mg oral tablet: 1 tab(s) orally once a day (at bedtime)

## 2020-04-11 LAB
ALBUMIN SERPL ELPH-MCNC: 3 G/DL — LOW (ref 3.3–5.2)
ALP SERPL-CCNC: 89 U/L — SIGNIFICANT CHANGE UP (ref 40–120)
ALT FLD-CCNC: 23 U/L — SIGNIFICANT CHANGE UP
ANION GAP SERPL CALC-SCNC: 16 MMOL/L — SIGNIFICANT CHANGE UP (ref 5–17)
AST SERPL-CCNC: 62 U/L — HIGH
BASOPHILS # BLD AUTO: 0.04 K/UL — SIGNIFICANT CHANGE UP (ref 0–0.2)
BASOPHILS NFR BLD AUTO: 0.4 % — SIGNIFICANT CHANGE UP (ref 0–2)
BILIRUB SERPL-MCNC: 0.5 MG/DL — SIGNIFICANT CHANGE UP (ref 0.4–2)
BUN SERPL-MCNC: 16 MG/DL — SIGNIFICANT CHANGE UP (ref 8–20)
CALCIUM SERPL-MCNC: 8.5 MG/DL — LOW (ref 8.6–10.2)
CHLORIDE SERPL-SCNC: 93 MMOL/L — LOW (ref 98–107)
CO2 SERPL-SCNC: 23 MMOL/L — SIGNIFICANT CHANGE UP (ref 22–29)
CREAT SERPL-MCNC: 0.72 MG/DL — SIGNIFICANT CHANGE UP (ref 0.5–1.3)
EOSINOPHIL # BLD AUTO: 0.01 K/UL — SIGNIFICANT CHANGE UP (ref 0–0.5)
EOSINOPHIL NFR BLD AUTO: 0.1 % — SIGNIFICANT CHANGE UP (ref 0–6)
GLUCOSE BLDC GLUCOMTR-MCNC: 157 MG/DL — HIGH (ref 70–99)
GLUCOSE BLDC GLUCOMTR-MCNC: 165 MG/DL — HIGH (ref 70–99)
GLUCOSE BLDC GLUCOMTR-MCNC: 206 MG/DL — HIGH (ref 70–99)
GLUCOSE BLDC GLUCOMTR-MCNC: 73 MG/DL — SIGNIFICANT CHANGE UP (ref 70–99)
GLUCOSE SERPL-MCNC: 204 MG/DL — HIGH (ref 70–99)
HCT VFR BLD CALC: 40.8 % — SIGNIFICANT CHANGE UP (ref 34.5–45)
HGB BLD-MCNC: 13.9 G/DL — SIGNIFICANT CHANGE UP (ref 11.5–15.5)
IMM GRANULOCYTES NFR BLD AUTO: 1.4 % — SIGNIFICANT CHANGE UP (ref 0–1.5)
LYMPHOCYTES # BLD AUTO: 1 K/UL — SIGNIFICANT CHANGE UP (ref 1–3.3)
LYMPHOCYTES # BLD AUTO: 9.3 % — LOW (ref 13–44)
MCHC RBC-ENTMCNC: 28.4 PG — SIGNIFICANT CHANGE UP (ref 27–34)
MCHC RBC-ENTMCNC: 34.1 GM/DL — SIGNIFICANT CHANGE UP (ref 32–36)
MCV RBC AUTO: 83.4 FL — SIGNIFICANT CHANGE UP (ref 80–100)
MONOCYTES # BLD AUTO: 0.52 K/UL — SIGNIFICANT CHANGE UP (ref 0–0.9)
MONOCYTES NFR BLD AUTO: 4.8 % — SIGNIFICANT CHANGE UP (ref 2–14)
NEUTROPHILS # BLD AUTO: 9.08 K/UL — HIGH (ref 1.8–7.4)
NEUTROPHILS NFR BLD AUTO: 84 % — HIGH (ref 43–77)
PLATELET # BLD AUTO: 385 K/UL — SIGNIFICANT CHANGE UP (ref 150–400)
POTASSIUM SERPL-MCNC: 4.6 MMOL/L — SIGNIFICANT CHANGE UP (ref 3.5–5.3)
POTASSIUM SERPL-SCNC: 4.6 MMOL/L — SIGNIFICANT CHANGE UP (ref 3.5–5.3)
PROT SERPL-MCNC: 7.1 G/DL — SIGNIFICANT CHANGE UP (ref 6.6–8.7)
RBC # BLD: 4.89 M/UL — SIGNIFICANT CHANGE UP (ref 3.8–5.2)
RBC # FLD: 13.6 % — SIGNIFICANT CHANGE UP (ref 10.3–14.5)
SODIUM SERPL-SCNC: 132 MMOL/L — LOW (ref 135–145)
WBC # BLD: 10.8 K/UL — HIGH (ref 3.8–10.5)
WBC # FLD AUTO: 10.8 K/UL — HIGH (ref 3.8–10.5)

## 2020-04-11 PROCEDURE — 93010 ELECTROCARDIOGRAM REPORT: CPT

## 2020-04-11 PROCEDURE — 71045 X-RAY EXAM CHEST 1 VIEW: CPT | Mod: 26

## 2020-04-11 RX ORDER — ENOXAPARIN SODIUM 100 MG/ML
40 INJECTION SUBCUTANEOUS DAILY
Refills: 0 | Status: DISCONTINUED | OUTPATIENT
Start: 2020-04-11 | End: 2020-04-16

## 2020-04-11 RX ADMIN — Medication 100 MILLIGRAM(S): at 17:42

## 2020-04-11 RX ADMIN — Medication 6 UNIT(S): at 12:44

## 2020-04-11 RX ADMIN — Medication 2: at 10:10

## 2020-04-11 RX ADMIN — Medication 1: at 12:44

## 2020-04-11 RX ADMIN — Medication 6 UNIT(S): at 10:10

## 2020-04-11 RX ADMIN — SIMVASTATIN 20 MILLIGRAM(S): 20 TABLET, FILM COATED ORAL at 22:10

## 2020-04-11 RX ADMIN — SODIUM CHLORIDE 1 GRAM(S): 9 INJECTION INTRAMUSCULAR; INTRAVENOUS; SUBCUTANEOUS at 05:50

## 2020-04-11 RX ADMIN — Medication 6 UNIT(S): at 17:42

## 2020-04-11 RX ADMIN — Medication 50 MILLIGRAM(S): at 05:50

## 2020-04-11 RX ADMIN — Medication 1: at 17:43

## 2020-04-11 RX ADMIN — ALBUTEROL 2 PUFF(S): 90 AEROSOL, METERED ORAL at 05:47

## 2020-04-11 RX ADMIN — FAMOTIDINE 20 MILLIGRAM(S): 10 INJECTION INTRAVENOUS at 12:43

## 2020-04-11 RX ADMIN — AMLODIPINE BESYLATE 5 MILLIGRAM(S): 2.5 TABLET ORAL at 05:50

## 2020-04-11 RX ADMIN — Medication 100 MILLIGRAM(S): at 01:16

## 2020-04-11 RX ADMIN — ENOXAPARIN SODIUM 40 MILLIGRAM(S): 100 INJECTION SUBCUTANEOUS at 17:42

## 2020-04-11 RX ADMIN — Medication 650 MILLIGRAM(S): at 10:11

## 2020-04-11 NOTE — PROGRESS NOTE ADULT - SUBJECTIVE AND OBJECTIVE BOX
Department of Cardiology                                                                  [facility] [address] [address]                                                            Telephone: [phone]. Fax:[phone]    	                                                      CARDIOLOGY NOTE    Subjective  69y Female S/P Micra pacemaker implant. The patient denies chest pain or SOB.    PAST MEDICAL & SURGICAL HISTORY:  H/O gastroesophageal reflux (GERD)  HTN (hypertension)  DM (diabetes mellitus)  History of benign brain tumor    FAMILY HISTORY:  FH: asthma: Son    Medications:  acetaminophen   Tablet .. 650 milliGRAM(s) Oral every 6 hours PRN  amLODIPine   Tablet 5 milliGRAM(s) Oral daily  dextrose 40% Gel 15 Gram(s) Oral once PRN  dextrose 5%. 1000 milliLiter(s) IV Continuous <Continuous>  dextrose 50% Injectable 12.5 Gram(s) IV Push once  dextrose 50% Injectable 25 Gram(s) IV Push once  dextrose 50% Injectable 25 Gram(s) IV Push once  famotidine    Tablet 20 milliGRAM(s) Oral daily  glucagon  Injectable 1 milliGRAM(s) IntraMuscular once PRN  hydrALAZINE 50 milliGRAM(s) Oral daily  insulin glargine Injectable (LANTUS) 25 Unit(s) SubCutaneous at bedtime  insulin lispro (HumaLOG) corrective regimen sliding scale   SubCutaneous three times a day before meals  insulin lispro (HumaLOG) corrective regimen sliding scale   SubCutaneous at bedtime  insulin lispro Injectable (HumaLOG) 6 Unit(s) SubCutaneous three times a day before meals  simvastatin 20 milliGRAM(s) Oral at bedtime  sodium chloride 0.9%. 500 milliLiter(s) IV Continuous <Continuous>    No Known Allergies    General: No fatigue, no fevers/chills  Respiratory: No dyspnea, no cough, no wheeze  CV: No chest pain, no palpitations  Abd: No nausea  Neuro: No headache, no dizziness    Objective:  T(C): 38.7 (20 @ 09:20), Max: 38.7 (20 @ 09:20)  HR: 102 (20 @ 09:20) (50 - 102)  BP: 163/77 (20 @ 09:20) (127/72 - 163/77)  RR: 19 (20 @ 09:20) (18 - 20)  SpO2: 96% (20 @ 09:20) (90% - 96%)    Majority of physical exam deferred to limit exposure in light of COVID 19 diagnosis.  CM: SR  General: Awake, alert, no acute distress  Right Groin: Figure 8 stitch removed, site soft, no bleeding, no hematoma  Extremities: No edema  EK.9   10.80 )-----------( 385      ( 2020 08:19 )             40.8     04-11    132  |  93    |  16.0  ----------------------------<  204  4.6   |  23.0  |  0.72    Ca    8.5      2020 08:19  Mg     2.0     04-10    TPro  7.1  /  Alb  3.0  /  TBili  0.5  /  DBili  x   /  AST  62  /  ALT  23  /  AlkPhos  89  04-11    PT/INR - ( 10 Apr 2020 09:29 )   PT: 13.5 sec;   INR: 1.19 ratio    PTT - ( 10 Apr 2020 09:29 )  PTT:32.0 sec

## 2020-04-11 NOTE — PROGRESS NOTE ADULT - ASSESSMENT
Diagnosis: Mobitz II block, S/P Micra pacemaker implant    Plan:   1. Meds:   Amlodipine 5 mg daily  Zocor 20 mg daily  Hydralazine 50 mg daily    2. Groin precautions  No heavy lifting.   No submerging groin in water  No driving or sex for 48 hours.    3. Follow up with Dr. Montgomery as an outpatient

## 2020-04-11 NOTE — PROGRESS NOTE ADULT - SUBJECTIVE AND OBJECTIVE BOX
Pt remains on NRB.  SOb has decreased slightly.  s/p pacemaker placement.    oe:  cvs:  s1, s2         rs:  cta bilaterally         gi:  soft, non tender         ext:  no cyanosis, clubbing, edema                right groin - mild tenderness - no hematoma    a/P  COVID pneumonia - continue present mgmt         bradycardia - s/p pacemaker placement         htn - controlled          hyperlipidemia - cont present mgmt

## 2020-04-12 LAB
ALBUMIN SERPL ELPH-MCNC: 2.7 G/DL — LOW (ref 3.3–5.2)
ALP SERPL-CCNC: 98 U/L — SIGNIFICANT CHANGE UP (ref 40–120)
ALT FLD-CCNC: 17 U/L — SIGNIFICANT CHANGE UP
ANION GAP SERPL CALC-SCNC: 14 MMOL/L — SIGNIFICANT CHANGE UP (ref 5–17)
AST SERPL-CCNC: 43 U/L — HIGH
BILIRUB SERPL-MCNC: 0.5 MG/DL — SIGNIFICANT CHANGE UP (ref 0.4–2)
BUN SERPL-MCNC: 15 MG/DL — SIGNIFICANT CHANGE UP (ref 8–20)
CALCIUM SERPL-MCNC: 8.2 MG/DL — LOW (ref 8.6–10.2)
CHLORIDE SERPL-SCNC: 95 MMOL/L — LOW (ref 98–107)
CO2 SERPL-SCNC: 23 MMOL/L — SIGNIFICANT CHANGE UP (ref 22–29)
CREAT SERPL-MCNC: 0.65 MG/DL — SIGNIFICANT CHANGE UP (ref 0.5–1.3)
GLUCOSE BLDC GLUCOMTR-MCNC: 112 MG/DL — HIGH (ref 70–99)
GLUCOSE BLDC GLUCOMTR-MCNC: 114 MG/DL — HIGH (ref 70–99)
GLUCOSE BLDC GLUCOMTR-MCNC: 222 MG/DL — HIGH (ref 70–99)
GLUCOSE BLDC GLUCOMTR-MCNC: 261 MG/DL — HIGH (ref 70–99)
GLUCOSE SERPL-MCNC: 231 MG/DL — HIGH (ref 70–99)
HCT VFR BLD CALC: 36.4 % — SIGNIFICANT CHANGE UP (ref 34.5–45)
HGB BLD-MCNC: 12.2 G/DL — SIGNIFICANT CHANGE UP (ref 11.5–15.5)
MCHC RBC-ENTMCNC: 28.2 PG — SIGNIFICANT CHANGE UP (ref 27–34)
MCHC RBC-ENTMCNC: 33.5 GM/DL — SIGNIFICANT CHANGE UP (ref 32–36)
MCV RBC AUTO: 84.1 FL — SIGNIFICANT CHANGE UP (ref 80–100)
PLATELET # BLD AUTO: 407 K/UL — HIGH (ref 150–400)
POTASSIUM SERPL-MCNC: 4.7 MMOL/L — SIGNIFICANT CHANGE UP (ref 3.5–5.3)
POTASSIUM SERPL-SCNC: 4.7 MMOL/L — SIGNIFICANT CHANGE UP (ref 3.5–5.3)
PROT SERPL-MCNC: 6.5 G/DL — LOW (ref 6.6–8.7)
RBC # BLD: 4.33 M/UL — SIGNIFICANT CHANGE UP (ref 3.8–5.2)
RBC # FLD: 13.9 % — SIGNIFICANT CHANGE UP (ref 10.3–14.5)
SODIUM SERPL-SCNC: 132 MMOL/L — LOW (ref 135–145)
WBC # BLD: 10.87 K/UL — HIGH (ref 3.8–10.5)
WBC # FLD AUTO: 10.87 K/UL — HIGH (ref 3.8–10.5)

## 2020-04-12 RX ADMIN — Medication 6 UNIT(S): at 09:49

## 2020-04-12 RX ADMIN — INSULIN GLARGINE 25 UNIT(S): 100 INJECTION, SOLUTION SUBCUTANEOUS at 21:51

## 2020-04-12 RX ADMIN — Medication 6 UNIT(S): at 13:56

## 2020-04-12 RX ADMIN — ENOXAPARIN SODIUM 40 MILLIGRAM(S): 100 INJECTION SUBCUTANEOUS at 11:45

## 2020-04-12 RX ADMIN — Medication 100 MILLIGRAM(S): at 06:13

## 2020-04-12 RX ADMIN — AMLODIPINE BESYLATE 5 MILLIGRAM(S): 2.5 TABLET ORAL at 06:13

## 2020-04-12 RX ADMIN — Medication 3: at 13:56

## 2020-04-12 RX ADMIN — Medication 50 MILLIGRAM(S): at 06:13

## 2020-04-12 RX ADMIN — FAMOTIDINE 20 MILLIGRAM(S): 10 INJECTION INTRAVENOUS at 11:45

## 2020-04-12 RX ADMIN — SIMVASTATIN 20 MILLIGRAM(S): 20 TABLET, FILM COATED ORAL at 21:50

## 2020-04-12 RX ADMIN — Medication 100 MILLIGRAM(S): at 18:05

## 2020-04-12 RX ADMIN — Medication 650 MILLIGRAM(S): at 21:50

## 2020-04-12 RX ADMIN — Medication 2: at 09:49

## 2020-04-12 NOTE — PROGRESS NOTE ADULT - SUBJECTIVE AND OBJECTIVE BOX
Pt is resting comfortably.  On oxygen by venti mask.  Saturating well.  Denies pain or discomfort.  Has poor appetite.    oe:  cvs:  s1, s2         rs:  basal crepts         gi:  soft, non tender         ext:  no cyanosis, clubbing, edema    a/p:  Bradycardia/heart block - s/p pacemaker placement          htn - controlled          COVID pneumonia - continue present mgmt

## 2020-04-13 LAB
ALBUMIN SERPL ELPH-MCNC: 3.1 G/DL — LOW (ref 3.3–5.2)
ALP SERPL-CCNC: 116 U/L — SIGNIFICANT CHANGE UP (ref 40–120)
ALT FLD-CCNC: 20 U/L — SIGNIFICANT CHANGE UP
ANION GAP SERPL CALC-SCNC: 13 MMOL/L — SIGNIFICANT CHANGE UP (ref 5–17)
AST SERPL-CCNC: 38 U/L — HIGH
BILIRUB SERPL-MCNC: 0.6 MG/DL — SIGNIFICANT CHANGE UP (ref 0.4–2)
BUN SERPL-MCNC: 18 MG/DL — SIGNIFICANT CHANGE UP (ref 8–20)
CALCIUM SERPL-MCNC: 8.9 MG/DL — SIGNIFICANT CHANGE UP (ref 8.6–10.2)
CHLORIDE SERPL-SCNC: 97 MMOL/L — LOW (ref 98–107)
CO2 SERPL-SCNC: 26 MMOL/L — SIGNIFICANT CHANGE UP (ref 22–29)
CREAT SERPL-MCNC: 0.59 MG/DL — SIGNIFICANT CHANGE UP (ref 0.5–1.3)
CRP SERPL-MCNC: 24.39 MG/DL — HIGH (ref 0–0.4)
CULTURE RESULTS: SIGNIFICANT CHANGE UP
GLUCOSE BLDC GLUCOMTR-MCNC: 107 MG/DL — HIGH (ref 70–99)
GLUCOSE BLDC GLUCOMTR-MCNC: 146 MG/DL — HIGH (ref 70–99)
GLUCOSE BLDC GLUCOMTR-MCNC: 184 MG/DL — HIGH (ref 70–99)
GLUCOSE BLDC GLUCOMTR-MCNC: 194 MG/DL — HIGH (ref 70–99)
GLUCOSE SERPL-MCNC: 167 MG/DL — HIGH (ref 70–99)
MAGNESIUM SERPL-MCNC: 2.3 MG/DL — SIGNIFICANT CHANGE UP (ref 1.6–2.6)
PHOSPHATE SERPL-MCNC: 2.1 MG/DL — LOW (ref 2.4–4.7)
POTASSIUM SERPL-MCNC: 3.6 MMOL/L — SIGNIFICANT CHANGE UP (ref 3.5–5.3)
POTASSIUM SERPL-SCNC: 3.6 MMOL/L — SIGNIFICANT CHANGE UP (ref 3.5–5.3)
PROT SERPL-MCNC: 6.8 G/DL — SIGNIFICANT CHANGE UP (ref 6.6–8.7)
SODIUM SERPL-SCNC: 136 MMOL/L — SIGNIFICANT CHANGE UP (ref 135–145)
SPECIMEN SOURCE: SIGNIFICANT CHANGE UP
TROPONIN T SERPL-MCNC: <0.01 NG/ML — SIGNIFICANT CHANGE UP (ref 0–0.06)

## 2020-04-13 PROCEDURE — 93010 ELECTROCARDIOGRAM REPORT: CPT

## 2020-04-13 PROCEDURE — 99232 SBSQ HOSP IP/OBS MODERATE 35: CPT | Mod: 24

## 2020-04-13 RX ORDER — SODIUM CHLORIDE 0.65 %
1 AEROSOL, SPRAY (ML) NASAL
Refills: 0 | Status: DISCONTINUED | OUTPATIENT
Start: 2020-04-13 | End: 2020-04-23

## 2020-04-13 RX ORDER — POTASSIUM PHOSPHATE, MONOBASIC POTASSIUM PHOSPHATE, DIBASIC 236; 224 MG/ML; MG/ML
15 INJECTION, SOLUTION INTRAVENOUS ONCE
Refills: 0 | Status: COMPLETED | OUTPATIENT
Start: 2020-04-13 | End: 2020-04-14

## 2020-04-13 RX ORDER — POTASSIUM PHOSPHATE, MONOBASIC POTASSIUM PHOSPHATE, DIBASIC 236; 224 MG/ML; MG/ML
20 INJECTION, SOLUTION INTRAVENOUS ONCE
Refills: 0 | Status: DISCONTINUED | OUTPATIENT
Start: 2020-04-13 | End: 2020-04-13

## 2020-04-13 RX ADMIN — Medication 1: at 17:38

## 2020-04-13 RX ADMIN — Medication 6 UNIT(S): at 17:37

## 2020-04-13 RX ADMIN — FAMOTIDINE 20 MILLIGRAM(S): 10 INJECTION INTRAVENOUS at 11:27

## 2020-04-13 RX ADMIN — Medication 6 UNIT(S): at 13:03

## 2020-04-13 RX ADMIN — INSULIN GLARGINE 25 UNIT(S): 100 INJECTION, SOLUTION SUBCUTANEOUS at 21:59

## 2020-04-13 RX ADMIN — Medication 100 MILLIGRAM(S): at 17:38

## 2020-04-13 RX ADMIN — Medication 100 MILLIGRAM(S): at 05:09

## 2020-04-13 RX ADMIN — Medication 6 UNIT(S): at 08:06

## 2020-04-13 RX ADMIN — SIMVASTATIN 20 MILLIGRAM(S): 20 TABLET, FILM COATED ORAL at 21:59

## 2020-04-13 RX ADMIN — Medication 100 MILLIGRAM(S): at 13:04

## 2020-04-13 RX ADMIN — AMLODIPINE BESYLATE 5 MILLIGRAM(S): 2.5 TABLET ORAL at 05:09

## 2020-04-13 RX ADMIN — ENOXAPARIN SODIUM 40 MILLIGRAM(S): 100 INJECTION SUBCUTANEOUS at 11:27

## 2020-04-13 RX ADMIN — Medication 1 SPRAY(S): at 21:59

## 2020-04-13 RX ADMIN — Medication 1: at 13:04

## 2020-04-13 RX ADMIN — Medication 100 MILLIGRAM(S): at 22:00

## 2020-04-13 RX ADMIN — Medication 50 MILLIGRAM(S): at 05:09

## 2020-04-13 NOTE — DIETITIAN INITIAL EVALUATION ADULT. - OTHER INFO
69yoF hx HTN, HLD, DM, GERD presenting with 5 days history of subjective fever and chills, dry cough, pleuritic chest pain and dyspnea at rest and on exertion.  Pt also endorsing some nausea and vomiting. Pt denies any abdominal pain or diarrhea. Covid +, hypoxia

## 2020-04-13 NOTE — CHART NOTE - NSCHARTNOTEFT_GEN_A_CORE
FOLLOW UP:    Received patient at change of shift with pending EKG and labs that were ordered for complaint of pleuritic chest pain. Patient is resting comfortably, in NAD. EKG reviewed, nonspecific T wave changes noted in leads V2-V3. Will repeat EKG in AM with morning labs. Patient also noted to have phos level of 2.1, repleted with 15mmol KPhos. Trend AM labs. All other labs WNL. Continue to monitor.

## 2020-04-13 NOTE — DIETITIAN INITIAL EVALUATION ADULT. - PERTINENT LABORATORY DATA
04-12 Na132 mmol/L<L> Glu 231 mg/dL<H> K+ 4.7 mmol/L Cr  0.65 mg/dL BUN 15.0 mg/dL Phos n/a   Alb 2.7 g/dL<L> PAB n/a

## 2020-04-13 NOTE — DIETITIAN INITIAL EVALUATION ADULT. - PHYSICAL APPEARANCE
ED Provider Note    Scribed for Thee Cabrera M.D. by Sivan Boykin. 9/25/2019, 10:09 AM.    Primary care provider: None Noted  Means of arrival: EMS  History obtained from: Patient  History limited by: None    CHIEF COMPLAINT  Chief Complaint   Patient presents with   • Chest Pain       HPI  Morales Olivier is a 67 y.o. Male with a history of A-fib and gout who presents to the Emergency Department via EMS for complaints of intermittent chest pain onset 1 hour ago. He lives at [facility] where he informed them to call EMS. Patient endorses associated heart palpitations, shortness of breath, nausea, and chest pressure. He notes that the severity of chest pain is currently 4/10. Patient states he has felt similar chest symptoms when they replaced his heart valve in 2017. Patient is on Coumadin. Patient has additional complaint of left knee pain. He has been taking Tylenol which slightly alleviates symptoms. Negative for fever.    REVIEW OF SYSTEMS  Pertinent positives include chest pain, heart palpitations, shortness of breath, nausea, chest pressure, and left knee pain .  Pertinent negatives include no fever.  As above otherwise all other systems are negative    PAST MEDICAL HISTORY   has a past medical history of Atrial fibrillation (HCC), Bronchitis, CAD (coronary artery disease), Gout, Heart valve disease, Hypertension, Pacemaker, Personal history of venous thrombosis and embolism (2009), PVC (premature ventricular contraction) (4/13/2019), Renal disorder, and Type II or unspecified type diabetes mellitus without mention of complication, not stated as uncontrolled.    SURGICAL HISTORY   has a past surgical history that includes mitral valve replace (3/14/08); maze procedure (3/14/08); angiogram (7/3/2009); angiogram (7/4/2009); cath removal (7/4/2009); thrombectomy (7/4/2009); embolectomy (7/4/2009); irrigation & debridement general (7/20/2009); wide excision (7/20/2009); other cardiac surgery (2008);  "other abdominal surgery; mitral valve replace (3/8/2017); and lopez (3/8/2017).    SOCIAL HISTORY  Social History     Tobacco Use   • Smoking status: Former Smoker     Last attempt to quit: 1981     Years since quittin.7   • Smokeless tobacco: Never Used   Substance Use Topics   • Alcohol use: No   • Drug use: No      Social History     Substance and Sexual Activity   Drug Use No       FAMILY HISTORY  Family History   Problem Relation Age of Onset   • Heart Disease Neg Hx        CURRENT MEDICATIONS  Home Medications     Reviewed by Richie Marino (Pharmacy Tech) on 19 at 1150  Med List Status: Complete   Medication Last Dose Status   acetaminophen (TYLENOL) 500 MG Tab unknown Active   allopurinol (ZYLOPRIM) 100 MG Tab 2019 Active   atorvastatin (LIPITOR) 20 MG Tab 2019 Active   benazepril (LOTENSIN) 10 MG Tab 2019 Active   metFORMIN (GLUCOPHAGE) 500 MG Tab 2019 Active   metoprolol (LOPRESSOR) 25 MG Tab 2019 Active   tamsulosin (FLOMAX) 0.4 MG capsule 2019 Active   warfarin (COUMADIN) 5 MG Tab 2019 Active                ALLERGIES  No Known Allergies    PHYSICAL EXAM  VITAL SIGNS: /78   Pulse 90   Temp 35.9 °C (96.7 °F) (Temporal)   Resp (!) 26   Ht 1.778 m (5' 10\")   Wt 121.1 kg (267 lb)   SpO2 98%   BMI 38.31 kg/m²     Constitutional: Well developed, Well nourished, No acute distress, Non-toxic appearance.   HENT: Normocephalic, Atraumatic, Bilateral external ears normal, oropharynx moist, No oral exudates, Nose normal.   Eyes:conjunctiva is normal, there are no signs of exudate.   Neck: Supple, no meningeal signs.  Lymphatic: No lymphadenopathy noted.   Cardiovascular: Midsystolic click. Regular rate and rhythm without murmurs gallops or rubs.   Thorax & Lungs: No respiratory distress. Breathing comfortably. Lungs are clear to auscultation bilaterally, there are no wheezes no rales. Chest wall is nontender.  Abdomen: Umbilical hernia. Soft, " nontender. Bowel sounds are present.   Skin: Warm, Dry, No erythema,   Back: No tenderness, No CVA tenderness.  Musculoskeletal: Left knee positive affusion with limited range of motion secondary to pain. MCL and LCL intact. Intact distal pulses, no clubbing, no cyanosis.  Neurologic: Alert & oriented x 3, Moving all extremities. No gross abnormalities.    Psychiatric: Affect normal, Judgment normal, Mood normal.     LABS  Results for orders placed or performed during the hospital encounter of 09/25/19   CBC with Differential   Result Value Ref Range    WBC 6.6 4.8 - 10.8 K/uL    RBC 4.51 (L) 4.70 - 6.10 M/uL    Hemoglobin 15.1 14.0 - 18.0 g/dL    Hematocrit 44.6 42.0 - 52.0 %    MCV 98.9 (H) 81.4 - 97.8 fL    MCH 33.5 (H) 27.0 - 33.0 pg    MCHC 33.9 33.7 - 35.3 g/dL    RDW 48.2 35.9 - 50.0 fL    Platelet Count 134 (L) 164 - 446 K/uL    MPV 10.2 9.0 - 12.9 fL    Neutrophils-Polys 77.80 (H) 44.00 - 72.00 %    Lymphocytes 11.00 (L) 22.00 - 41.00 %    Monocytes 7.20 0.00 - 13.40 %    Eosinophils 3.20 0.00 - 6.90 %    Basophils 0.50 0.00 - 1.80 %    Immature Granulocytes 0.30 0.00 - 0.90 %    Nucleated RBC 0.00 /100 WBC    Neutrophils (Absolute) 5.17 1.82 - 7.42 K/uL    Lymphs (Absolute) 0.73 (L) 1.00 - 4.80 K/uL    Monos (Absolute) 0.48 0.00 - 0.85 K/uL    Eos (Absolute) 0.21 0.00 - 0.51 K/uL    Baso (Absolute) 0.03 0.00 - 0.12 K/uL    Immature Granulocytes (abs) 0.02 0.00 - 0.11 K/uL    NRBC (Absolute) 0.00 K/uL   Complete Metabolic Panel (CMP)   Result Value Ref Range    Sodium 138 135 - 145 mmol/L    Potassium 4.8 3.6 - 5.5 mmol/L    Chloride 106 96 - 112 mmol/L    Co2 23 20 - 33 mmol/L    Anion Gap 9.0 0.0 - 11.9    Glucose 127 (H) 65 - 99 mg/dL    Bun 29 (H) 8 - 22 mg/dL    Creatinine 1.26 0.50 - 1.40 mg/dL    Calcium 9.2 8.5 - 10.5 mg/dL    AST(SGOT) 16 12 - 45 U/L    ALT(SGPT) 13 2 - 50 U/L    Alkaline Phosphatase 57 30 - 99 U/L    Total Bilirubin 0.7 0.1 - 1.5 mg/dL    Albumin 4.3 3.2 - 4.9 g/dL    Total  Protein 6.1 6.0 - 8.2 g/dL    Globulin 1.8 (L) 1.9 - 3.5 g/dL    A-G Ratio 2.4 g/dL   Troponin   Result Value Ref Range    Troponin T 18 6 - 19 ng/L   URIC ACID   Result Value Ref Range    Uric Acid 7.6 2.5 - 8.3 mg/dL   Prothrombin Time   Result Value Ref Range    PT 25.7 (H) 12.0 - 14.6 sec    INR 2.26 (H) 0.87 - 1.13   ESTIMATED GFR   Result Value Ref Range    GFR If African American >60 >60 mL/min/1.73 m 2    GFR If Non  57 (A) >60 mL/min/1.73 m 2   EKG   Result Value Ref Range    Report       [facility] Emergency Dept.    Test Date:  2019  Pt Name:    AILYN MCINTOSH                 Department: ER  MRN:        [id number]                      Room:       [facility]  Gender:     Male                         Technician: [id number]  :        1952                   Requested By:ER TRIAGE PROTOCOL  Order #:    [id number]                    Reading MD: ROSA CISSE MD    Measurements  Intervals                                Axis  Rate:       86                           P:          0  UT:         192                          QRS:        64  QRSD:       92                           T:          -10  QT:         388  QTc:        464    Interpretive Statements  SINUS RHYTHM  Compared to ECG 2019 10:39:51  Ventricular-paced complex(es) or rhythm no longer present  normal ECG  Electronically Signed On 2019 11:10:23 PDT by ROSA CISSE MD        All labs reviewed by me.    EKG  Interpreted by me as shown above.    RADIOLOGY  DX-KNEE COMPLETE 4+ LEFT   Final Result      No acute fracture. Mild degenerative changes.      DX-CHEST-PORTABLE (1 VIEW)   Final Result      Stable mild cardiomegaly.      EC-ECHOCARDIOGRAM COMPLETE W/O CONT    (Results Pending)     The radiologist's interpretation of all radiological studies have been reviewed by me.    COURSE & MEDICAL DECISION MAKING  Pertinent Labs & Imaging studies reviewed. (See chart for details)    10:09 AM - Patient seen  and examined at bedside. Patient will be treated with Morphine 4 mg. Ordered DX-knee left and DX-chest, uric acid, CBC with differential, CMP, troponin, and EKG to evaluate his symptoms. The differential diagnoses include but are not limited to: ACS, A-fib, gout, arthritis.    11:34 AM - Patient was reevaluated at bedside. Patient is feeling improved after Morphine. Discussed lab and radiology results with the patient and informed them of plan for admission to the hospital for further medical treatment and care. Patient understands and agrees to the plan.      11:37 AM -  I discussed the patient's case and the above findings with Dr. Quintana (hospitalist) who admit the patient    Decision Making:  Patient presents emerged department for evaluation.  Clinically the patient does have chest discomfort.  EKG and initial troponin are negative.  Because the patient's significant past medical history and his description of this chest pain I am concerned about the possibility of acute coronary syndrome.  Because of this I do for the patient should be admitted for further risk stratification.  I have spoken to the hospitalist for this admission.  From the standpoint of his left knee does have an effusion x-ray shows no signs of abnormalities and his uric acid level is normal.  Could very well be an arthritic change.  At this point nonemergent bites I did give him morphine for pain control which is feeling improved.  Recommend to follow-up with orthopedics as an outpatient for further outpatient treatment care return as needed from the standpoint of his knee.         FINAL IMPRESSION  1. Chest pain, unspecified type    2. Palpitations          Sivan JARRETT (Trisha), am scribing for, and in the presence of, Thee Cabrera M.D..    Electronically signed by: Sivan Boykin (Angelikaibjody), 9/25/2019    Thee JARRETT M.D. personally performed the services described in this documentation, as scribed by Sivan Boykin in  my presence, and it is both accurate and complete. C    The note accurately reflects work and decisions made by me.  Thee Cabrera  9/25/2019  12:46 PM       BMI 27.5

## 2020-04-13 NOTE — PROGRESS NOTE ADULT - SUBJECTIVE AND OBJECTIVE BOX
[facility] CARDIAC ELECTROPHYSIOLOGY                                                       [facility]                                                      Office: [address] [address]                                                       Telephone: [phone]. Fax:[phone]    Subjective: Pt feels breathing improved.    TELE: NSR with 1:1 conduction. Last pacing needs on evening of 4/10.    MEDICATIONS  (STANDING):  amLODIPine   Tablet 5 milliGRAM(s) Oral daily  dextrose 5%. 1000 milliLiter(s) (50 mL/Hr) IV Continuous <Continuous>  dextrose 50% Injectable 12.5 Gram(s) IV Push once  dextrose 50% Injectable 25 Gram(s) IV Push once  dextrose 50% Injectable 25 Gram(s) IV Push once  doxycycline hyclate Capsule 100 milliGRAM(s) Oral every 12 hours  enoxaparin Injectable 40 milliGRAM(s) SubCutaneous daily  famotidine    Tablet 20 milliGRAM(s) Oral daily  hydrALAZINE 50 milliGRAM(s) Oral daily  insulin glargine Injectable (LANTUS) 25 Unit(s) SubCutaneous at bedtime  insulin lispro (HumaLOG) corrective regimen sliding scale   SubCutaneous three times a day before meals  insulin lispro (HumaLOG) corrective regimen sliding scale   SubCutaneous at bedtime  insulin lispro Injectable (HumaLOG) 6 Unit(s) SubCutaneous three times a day before meals  simvastatin 20 milliGRAM(s) Oral at bedtime  sodium chloride 0.9%. 500 milliLiter(s) (100 mL/Hr) IV Continuous <Continuous>    MEDICATIONS  (PRN):  acetaminophen   Tablet .. 650 milliGRAM(s) Oral every 6 hours PRN Temp greater or equal to 38C (100.4F), Mild Pain (1 - 3), Moderate Pain (4 - 6)  dextrose 40% Gel 15 Gram(s) Oral once PRN Blood Glucose LESS THAN 70 milliGRAM(s)/deciliter  glucagon  Injectable 1 milliGRAM(s) IntraMuscular once PRN Glucose LESS THAN 70 milligrams/deciliter    Allergies  No Known Allergies    Vital Signs Last 24 Hrs  T(C): 37.4 (13 Apr 2020 07:58), Max: 37.4 (13 Apr 2020 07:58)  T(F): 99.4 (13 Apr 2020 07:58), Max: 99.4 (13 Apr 2020 07:58)  HR: 94 (13 Apr 2020 07:58) (82 - 102)  BP: 147/67 (13 Apr 2020 07:58) (146/66 - 147/67)  BP(mean): --  RR: 18 (13 Apr 2020 07:58) (18 - 18)  SpO2: 91% (13 Apr 2020 07:58) (91% - 99%)    LABS:                        12.2   10.87 )-----------( 407      ( 12 Apr 2020 08:26 )             36.4     04-12    132<L>  |  95<L>  |  15.0  ----------------------------<  231<H>  4.7   |  23.0  |  0.65    Ca    8.2<L>      12 Apr 2020 08:26    TPro  6.5<L>  /  Alb  2.7<L>  /  TBili  0.5  /  DBili  x   /  AST  43<H>  /  ALT  17  /  AlkPhos  98  04-12    Device Interrogation:  Impedance: ~600 Ohms  R waves: 9-10 mV  Capture Threshold: 0.38V @ 0.24ms  VVI 60 bpm  VS: 97.5%, : 2.5%

## 2020-04-13 NOTE — PROGRESS NOTE ADULT - SUBJECTIVE AND OBJECTIVE BOX
Pt feels better.  On 50% venti mask without distress.  s/p pacemaker placement.  Still has some cough    oe:  cvs:  s1, s2         rs:  cta bilaterally         gi:  soft, non tender         ext:  no cyanosis, clubbing, edema    a/P:  COVID - continue present mgmt          bradycardia - s/p pacemaker placement          htn - controlled

## 2020-04-13 NOTE — PROGRESS NOTE ADULT - ASSESSMENT
69 year old Croatian speaking woman with HTN, HL, DM, h/o CVA, h/o ABI/BSO, RBBB presenting with COVID-19 viral pneumonia and Mobitz II block now s/p Micra AV leadless pacemaker which has appropriate position on CXR and lead function on device interrogation. Currently set to VVI mode as there is poor atrial sensing with rapid rates.    Recommendations:  - Continue treatment for COVID-19  - Follow up in EP clinic in 4-6 weeks    Thank you for this consultation. EP will sign off. Please contact EP team with any questions.    Jason Montgomery MD  Clinical Cardiac Electrophysiology

## 2020-04-14 LAB
ALBUMIN SERPL ELPH-MCNC: 3.2 G/DL — LOW (ref 3.3–5.2)
ALP SERPL-CCNC: 122 U/L — HIGH (ref 40–120)
ALT FLD-CCNC: 21 U/L — SIGNIFICANT CHANGE UP
ANION GAP SERPL CALC-SCNC: 14 MMOL/L — SIGNIFICANT CHANGE UP (ref 5–17)
AST SERPL-CCNC: 38 U/L — HIGH
BILIRUB SERPL-MCNC: 0.6 MG/DL — SIGNIFICANT CHANGE UP (ref 0.4–2)
BUN SERPL-MCNC: 15 MG/DL — SIGNIFICANT CHANGE UP (ref 8–20)
CALCIUM SERPL-MCNC: 9 MG/DL — SIGNIFICANT CHANGE UP (ref 8.6–10.2)
CHLORIDE SERPL-SCNC: 99 MMOL/L — SIGNIFICANT CHANGE UP (ref 98–107)
CK SERPL-CCNC: 104 U/L — SIGNIFICANT CHANGE UP (ref 25–170)
CO2 SERPL-SCNC: 28 MMOL/L — SIGNIFICANT CHANGE UP (ref 22–29)
CREAT SERPL-MCNC: 0.58 MG/DL — SIGNIFICANT CHANGE UP (ref 0.5–1.3)
FERRITIN SERPL-MCNC: 816 NG/ML — HIGH (ref 15–150)
GLUCOSE BLDC GLUCOMTR-MCNC: 110 MG/DL — HIGH (ref 70–99)
GLUCOSE BLDC GLUCOMTR-MCNC: 117 MG/DL — HIGH (ref 70–99)
GLUCOSE BLDC GLUCOMTR-MCNC: 122 MG/DL — HIGH (ref 70–99)
GLUCOSE BLDC GLUCOMTR-MCNC: 134 MG/DL — HIGH (ref 70–99)
GLUCOSE SERPL-MCNC: 90 MG/DL — SIGNIFICANT CHANGE UP (ref 70–99)
HCT VFR BLD CALC: 37.5 % — SIGNIFICANT CHANGE UP (ref 34.5–45)
HGB BLD-MCNC: 12.6 G/DL — SIGNIFICANT CHANGE UP (ref 11.5–15.5)
MCHC RBC-ENTMCNC: 28.3 PG — SIGNIFICANT CHANGE UP (ref 27–34)
MCHC RBC-ENTMCNC: 33.6 GM/DL — SIGNIFICANT CHANGE UP (ref 32–36)
MCV RBC AUTO: 84.3 FL — SIGNIFICANT CHANGE UP (ref 80–100)
PLATELET # BLD AUTO: 473 K/UL — HIGH (ref 150–400)
POTASSIUM SERPL-MCNC: 4 MMOL/L — SIGNIFICANT CHANGE UP (ref 3.5–5.3)
POTASSIUM SERPL-SCNC: 4 MMOL/L — SIGNIFICANT CHANGE UP (ref 3.5–5.3)
PROT SERPL-MCNC: 6.7 G/DL — SIGNIFICANT CHANGE UP (ref 6.6–8.7)
RBC # BLD: 4.45 M/UL — SIGNIFICANT CHANGE UP (ref 3.8–5.2)
RBC # FLD: 14 % — SIGNIFICANT CHANGE UP (ref 10.3–14.5)
SODIUM SERPL-SCNC: 141 MMOL/L — SIGNIFICANT CHANGE UP (ref 135–145)
TROPONIN T SERPL-MCNC: <0.01 NG/ML — SIGNIFICANT CHANGE UP (ref 0–0.06)
WBC # BLD: 14.05 K/UL — HIGH (ref 3.8–10.5)
WBC # FLD AUTO: 14.05 K/UL — HIGH (ref 3.8–10.5)

## 2020-04-14 PROCEDURE — 93010 ELECTROCARDIOGRAM REPORT: CPT

## 2020-04-14 RX ORDER — METOPROLOL TARTRATE 50 MG
25 TABLET ORAL ONCE
Refills: 0 | Status: COMPLETED | OUTPATIENT
Start: 2020-04-14 | End: 2020-04-14

## 2020-04-14 RX ORDER — METOPROLOL TARTRATE 50 MG
25 TABLET ORAL EVERY 12 HOURS
Refills: 0 | Status: DISCONTINUED | OUTPATIENT
Start: 2020-04-14 | End: 2020-04-23

## 2020-04-14 RX ADMIN — Medication 6 UNIT(S): at 17:27

## 2020-04-14 RX ADMIN — Medication 1 SPRAY(S): at 17:28

## 2020-04-14 RX ADMIN — Medication 6 UNIT(S): at 09:29

## 2020-04-14 RX ADMIN — Medication 1 SPRAY(S): at 04:27

## 2020-04-14 RX ADMIN — Medication 1 SPRAY(S): at 23:09

## 2020-04-14 RX ADMIN — ENOXAPARIN SODIUM 40 MILLIGRAM(S): 100 INJECTION SUBCUTANEOUS at 12:10

## 2020-04-14 RX ADMIN — Medication 100 MILLIGRAM(S): at 23:08

## 2020-04-14 RX ADMIN — Medication 1 SPRAY(S): at 00:28

## 2020-04-14 RX ADMIN — Medication 100 MILLIGRAM(S): at 17:23

## 2020-04-14 RX ADMIN — Medication 25 MILLIGRAM(S): at 09:33

## 2020-04-14 RX ADMIN — Medication 6 UNIT(S): at 13:04

## 2020-04-14 RX ADMIN — Medication 100 MILLIGRAM(S): at 12:09

## 2020-04-14 RX ADMIN — Medication 100 MILLIGRAM(S): at 04:25

## 2020-04-14 RX ADMIN — Medication 50 MILLIGRAM(S): at 04:26

## 2020-04-14 RX ADMIN — INSULIN GLARGINE 25 UNIT(S): 100 INJECTION, SOLUTION SUBCUTANEOUS at 23:08

## 2020-04-14 RX ADMIN — SIMVASTATIN 20 MILLIGRAM(S): 20 TABLET, FILM COATED ORAL at 23:08

## 2020-04-14 RX ADMIN — POTASSIUM PHOSPHATE, MONOBASIC POTASSIUM PHOSPHATE, DIBASIC 62.5 MILLIMOLE(S): 236; 224 INJECTION, SOLUTION INTRAVENOUS at 04:31

## 2020-04-14 RX ADMIN — AMLODIPINE BESYLATE 5 MILLIGRAM(S): 2.5 TABLET ORAL at 04:27

## 2020-04-14 RX ADMIN — FAMOTIDINE 20 MILLIGRAM(S): 10 INJECTION INTRAVENOUS at 12:11

## 2020-04-14 RX ADMIN — Medication 25 MILLIGRAM(S): at 17:23

## 2020-04-14 RX ADMIN — Medication 1 SPRAY(S): at 12:10

## 2020-04-15 LAB
GLUCOSE BLDC GLUCOMTR-MCNC: 160 MG/DL — HIGH (ref 70–99)
GLUCOSE BLDC GLUCOMTR-MCNC: 165 MG/DL — HIGH (ref 70–99)
GLUCOSE BLDC GLUCOMTR-MCNC: 182 MG/DL — HIGH (ref 70–99)
GLUCOSE BLDC GLUCOMTR-MCNC: 198 MG/DL — HIGH (ref 70–99)
GLUCOSE BLDC GLUCOMTR-MCNC: 73 MG/DL — SIGNIFICANT CHANGE UP (ref 70–99)

## 2020-04-15 RX ADMIN — Medication 1: at 09:20

## 2020-04-15 RX ADMIN — Medication 6 UNIT(S): at 11:50

## 2020-04-15 RX ADMIN — Medication 1 SPRAY(S): at 11:51

## 2020-04-15 RX ADMIN — Medication 100 MILLIGRAM(S): at 05:01

## 2020-04-15 RX ADMIN — INSULIN GLARGINE 25 UNIT(S): 100 INJECTION, SOLUTION SUBCUTANEOUS at 22:24

## 2020-04-15 RX ADMIN — Medication 1 SPRAY(S): at 17:12

## 2020-04-15 RX ADMIN — Medication 50 MILLIGRAM(S): at 05:01

## 2020-04-15 RX ADMIN — Medication 100 MILLIGRAM(S): at 17:11

## 2020-04-15 RX ADMIN — Medication 100 MILLIGRAM(S): at 22:24

## 2020-04-15 RX ADMIN — Medication 1 SPRAY(S): at 22:25

## 2020-04-15 RX ADMIN — Medication 6 UNIT(S): at 17:10

## 2020-04-15 RX ADMIN — Medication 25 MILLIGRAM(S): at 17:11

## 2020-04-15 RX ADMIN — FAMOTIDINE 20 MILLIGRAM(S): 10 INJECTION INTRAVENOUS at 11:50

## 2020-04-15 RX ADMIN — AMLODIPINE BESYLATE 5 MILLIGRAM(S): 2.5 TABLET ORAL at 05:01

## 2020-04-15 RX ADMIN — Medication 1: at 11:51

## 2020-04-15 RX ADMIN — ENOXAPARIN SODIUM 40 MILLIGRAM(S): 100 INJECTION SUBCUTANEOUS at 11:49

## 2020-04-15 RX ADMIN — Medication 6 UNIT(S): at 09:19

## 2020-04-15 RX ADMIN — SIMVASTATIN 20 MILLIGRAM(S): 20 TABLET, FILM COATED ORAL at 22:24

## 2020-04-15 RX ADMIN — Medication 1 SPRAY(S): at 05:02

## 2020-04-15 RX ADMIN — Medication 1: at 17:10

## 2020-04-15 RX ADMIN — Medication 100 MILLIGRAM(S): at 11:50

## 2020-04-15 RX ADMIN — Medication 25 MILLIGRAM(S): at 05:01

## 2020-04-15 NOTE — PROGRESS NOTE ADULT - SUBJECTIVE AND OBJECTIVE BOX
Pt is on oxygen by 50% venti mask.  Pt feels better.  Has some SOB on moving.    oe:  cvs:  s1, s2         rs:  cta bilaterally         gi:  soft, non tender         ext:  no cyanosis, clubbing, edema    a/p:  COVID pneumonia - continue present mgmt          htn - controlled          dm - continue present mgmt          decrease oxygen as tolerated

## 2020-04-16 LAB
ALBUMIN SERPL ELPH-MCNC: 2.8 G/DL — LOW (ref 3.3–5.2)
ALP SERPL-CCNC: 118 U/L — SIGNIFICANT CHANGE UP (ref 40–120)
ALT FLD-CCNC: 22 U/L — SIGNIFICANT CHANGE UP
ANION GAP SERPL CALC-SCNC: 15 MMOL/L — SIGNIFICANT CHANGE UP (ref 5–17)
AST SERPL-CCNC: 27 U/L — SIGNIFICANT CHANGE UP
BILIRUB SERPL-MCNC: 0.6 MG/DL — SIGNIFICANT CHANGE UP (ref 0.4–2)
BUN SERPL-MCNC: 16 MG/DL — SIGNIFICANT CHANGE UP (ref 8–20)
CALCIUM SERPL-MCNC: 9.1 MG/DL — SIGNIFICANT CHANGE UP (ref 8.6–10.2)
CHLORIDE SERPL-SCNC: 98 MMOL/L — SIGNIFICANT CHANGE UP (ref 98–107)
CO2 SERPL-SCNC: 26 MMOL/L — SIGNIFICANT CHANGE UP (ref 22–29)
CORTICOSTEROID BINDING GLOBULIN RESULT: 2.2 MG/DL — SIGNIFICANT CHANGE UP
CORTIS F/TOTAL MFR SERPL: 49 % — SIGNIFICANT CHANGE UP
CORTIS SERPL-MCNC: 29 UG/DL — HIGH
CORTISOL, FREE RESULT: 14 UG/DL — HIGH
CREAT SERPL-MCNC: 0.54 MG/DL — SIGNIFICANT CHANGE UP (ref 0.5–1.3)
GLUCOSE BLDC GLUCOMTR-MCNC: 108 MG/DL — HIGH (ref 70–99)
GLUCOSE BLDC GLUCOMTR-MCNC: 169 MG/DL — HIGH (ref 70–99)
GLUCOSE BLDC GLUCOMTR-MCNC: 181 MG/DL — HIGH (ref 70–99)
GLUCOSE BLDC GLUCOMTR-MCNC: 212 MG/DL — HIGH (ref 70–99)
GLUCOSE BLDC GLUCOMTR-MCNC: 221 MG/DL — HIGH (ref 70–99)
GLUCOSE SERPL-MCNC: 179 MG/DL — HIGH (ref 70–99)
HCT VFR BLD CALC: 36.4 % — SIGNIFICANT CHANGE UP (ref 34.5–45)
HGB BLD-MCNC: 12.1 G/DL — SIGNIFICANT CHANGE UP (ref 11.5–15.5)
MCHC RBC-ENTMCNC: 28.3 PG — SIGNIFICANT CHANGE UP (ref 27–34)
MCHC RBC-ENTMCNC: 33.2 GM/DL — SIGNIFICANT CHANGE UP (ref 32–36)
MCV RBC AUTO: 85 FL — SIGNIFICANT CHANGE UP (ref 80–100)
PLATELET # BLD AUTO: 420 K/UL — HIGH (ref 150–400)
POTASSIUM SERPL-MCNC: 3.3 MMOL/L — LOW (ref 3.5–5.3)
POTASSIUM SERPL-SCNC: 3.3 MMOL/L — LOW (ref 3.5–5.3)
PROT SERPL-MCNC: 6.4 G/DL — LOW (ref 6.6–8.7)
RBC # BLD: 4.28 M/UL — SIGNIFICANT CHANGE UP (ref 3.8–5.2)
RBC # FLD: 14.2 % — SIGNIFICANT CHANGE UP (ref 10.3–14.5)
SODIUM SERPL-SCNC: 139 MMOL/L — SIGNIFICANT CHANGE UP (ref 135–145)
WBC # BLD: 13.73 K/UL — HIGH (ref 3.8–10.5)
WBC # FLD AUTO: 13.73 K/UL — HIGH (ref 3.8–10.5)

## 2020-04-16 PROCEDURE — 0042T: CPT

## 2020-04-16 PROCEDURE — 70498 CT ANGIOGRAPHY NECK: CPT | Mod: 26

## 2020-04-16 PROCEDURE — 99291 CRITICAL CARE FIRST HOUR: CPT

## 2020-04-16 PROCEDURE — 70496 CT ANGIOGRAPHY HEAD: CPT | Mod: 26

## 2020-04-16 PROCEDURE — 70450 CT HEAD/BRAIN W/O DYE: CPT | Mod: 26,59

## 2020-04-16 RX ORDER — SODIUM CHLORIDE 9 MG/ML
250 INJECTION INTRAMUSCULAR; INTRAVENOUS; SUBCUTANEOUS ONCE
Refills: 0 | Status: DISCONTINUED | OUTPATIENT
Start: 2020-04-16 | End: 2020-04-23

## 2020-04-16 RX ORDER — ENOXAPARIN SODIUM 100 MG/ML
70 INJECTION SUBCUTANEOUS EVERY 12 HOURS
Refills: 0 | Status: DISCONTINUED | OUTPATIENT
Start: 2020-04-16 | End: 2020-04-23

## 2020-04-16 RX ADMIN — Medication 2: at 10:38

## 2020-04-16 RX ADMIN — Medication 25 MILLIGRAM(S): at 18:16

## 2020-04-16 RX ADMIN — Medication 6 UNIT(S): at 10:39

## 2020-04-16 RX ADMIN — ENOXAPARIN SODIUM 70 MILLIGRAM(S): 100 INJECTION SUBCUTANEOUS at 23:18

## 2020-04-16 RX ADMIN — FAMOTIDINE 20 MILLIGRAM(S): 10 INJECTION INTRAVENOUS at 23:25

## 2020-04-16 RX ADMIN — Medication 100 MILLIGRAM(S): at 06:03

## 2020-04-16 RX ADMIN — Medication 1 SPRAY(S): at 17:01

## 2020-04-16 RX ADMIN — Medication 1 SPRAY(S): at 06:03

## 2020-04-16 RX ADMIN — Medication 100 MILLIGRAM(S): at 06:02

## 2020-04-16 RX ADMIN — Medication 6 UNIT(S): at 18:00

## 2020-04-16 RX ADMIN — AMLODIPINE BESYLATE 5 MILLIGRAM(S): 2.5 TABLET ORAL at 06:03

## 2020-04-16 RX ADMIN — INSULIN GLARGINE 25 UNIT(S): 100 INJECTION, SOLUTION SUBCUTANEOUS at 22:05

## 2020-04-16 RX ADMIN — Medication 100 MILLIGRAM(S): at 23:18

## 2020-04-16 RX ADMIN — SIMVASTATIN 20 MILLIGRAM(S): 20 TABLET, FILM COATED ORAL at 23:17

## 2020-04-16 RX ADMIN — Medication 25 MILLIGRAM(S): at 06:03

## 2020-04-16 RX ADMIN — Medication 1 SPRAY(S): at 15:19

## 2020-04-16 RX ADMIN — Medication 100 MILLIGRAM(S): at 23:17

## 2020-04-16 RX ADMIN — Medication 1: at 18:00

## 2020-04-16 RX ADMIN — Medication 50 MILLIGRAM(S): at 06:03

## 2020-04-16 NOTE — PROGRESS NOTE ADULT - SUBJECTIVE AND OBJECTIVE BOX
Pt was found to be aphasic and with right sided weakness this am.  Rapid response was called and pt is in CT scan for a urgent CT scan to R/O cva.  Dr. Stratton  was consulted about this patient.    oe:  cvs:  s1, s2         rs:  cta bilaterally        gi:  soft, non tender        ext:  no cyanosis, clubbing, edema        neuro - dense right upper and lower weakness 0/5, aphasia    a/P  CVA:  awaiting CT brain results         htn - controlled         bradycardia - s/p pacemaker placement         continue all other meds

## 2020-04-16 NOTE — CONSULT NOTE ADULT - ASSESSMENT
The patient is a 69y Female who is followed by neurology because of stroke    Acute stroke in COVID patient  May be hypercoaguable due to COVID  will start lovenox 1mg/kg bid- this will also provide DVT ppx  will check hypercoag profile  will check CRP, ferritin  stroke orders  written  lipid panel  echocardiogram    PT/OT/Speech/swallow    will follow with you    Rajesh Stratton MD PhD   [id number]

## 2020-04-16 NOTE — CONSULT NOTE ADULT - ATTENDING COMMENTS
I spent 48 minutes providing critical care to evaluate for possible acute stroke with risk of acute neurological decompensation and the possibility of administering alteplase and/or evaluating/arranging for neurointervention.  This time was spent obtaining the history, examining patient, reviewing lab work and radiological images and discussion with the clinical team and their family.

## 2020-04-16 NOTE — CONSULT NOTE ADULT - SUBJECTIVE AND OBJECTIVE BOX
[facility]                                     Neurology at [place]                                 Chayito Jean, & Jose                                  [address]                                        [place], [place], [address]                                             [phone]    CC: code stroke  HPI:  The patient is a 69y Female who presented with acute aphasia and right sided weakness today.  Likely last know  well was at 0730 when he nurse made initial assessment and said she was speaking, but full evaluation not performed.  At about 1055 she was noted to have her symptoms of aphasia and right sided weakness.  She had a head CT done which I reviewed in the scanner at about 9900-1280 which showed left frontal/parietal CVA.  There was a delay in getting her CTA/P due to difficulty finding a vein for a line of adequate size for CTA.  Once done I reviewed and there was ICU stenosis, likely from atherosclerosis, narrow but not occluded.  CTP was further delayed because of difficulty sending images to [facility] to be processed.  Once these images were obtained it showed a small almost entirely completed stroke.  It was decided not to intervene due to the fact that there was no significant penumbra seen on CTP.  Alteplase was not given due to the timing not being clear, that there was a completed stroke on head CT.  I responded to the code stroke.    PAST MEDICAL & SURGICAL HISTORY:  H/O gastroesophageal reflux (GERD)  HTN (hypertension)  DM (diabetes mellitus)  History of benign brain tumor      MEDICATIONS  (STANDING):  amLODIPine   Tablet 5 milliGRAM(s) Oral daily  benzonatate 100 milliGRAM(s) Oral every 8 hours  dextrose 5%. 1000 milliLiter(s) (50 mL/Hr) IV Continuous <Continuous>  dextrose 50% Injectable 12.5 Gram(s) IV Push once  dextrose 50% Injectable 25 Gram(s) IV Push once  dextrose 50% Injectable 25 Gram(s) IV Push once  doxycycline hyclate Capsule 100 milliGRAM(s) Oral every 12 hours  enoxaparin Injectable 70 milliGRAM(s) SubCutaneous every 12 hours  famotidine    Tablet 20 milliGRAM(s) Oral daily  hydrALAZINE 50 milliGRAM(s) Oral daily  insulin glargine Injectable (LANTUS) 25 Unit(s) SubCutaneous at bedtime  insulin lispro (HumaLOG) corrective regimen sliding scale   SubCutaneous three times a day before meals  insulin lispro (HumaLOG) corrective regimen sliding scale   SubCutaneous at bedtime  insulin lispro Injectable (HumaLOG) 6 Unit(s) SubCutaneous three times a day before meals  metoprolol tartrate 25 milliGRAM(s) Oral every 12 hours  simvastatin 20 milliGRAM(s) Oral at bedtime  sodium chloride 0.65% Nasal 1 Spray(s) Both Nostrils four times a day  sodium chloride 0.9% Bolus 250 milliLiter(s) IV Bolus once  sodium chloride 0.9%. 500 milliLiter(s) (100 mL/Hr) IV Continuous <Continuous>    MEDICATIONS  (PRN):  acetaminophen   Tablet .. 650 milliGRAM(s) Oral every 6 hours PRN Temp greater or equal to 38C (100.4F), Mild Pain (1 - 3), Moderate Pain (4 - 6)  dextrose 40% Gel 15 Gram(s) Oral once PRN Blood Glucose LESS THAN 70 milliGRAM(s)/deciliter  glucagon  Injectable 1 milliGRAM(s) IntraMuscular once PRN Glucose LESS THAN 70 milligrams/deciliter      Allergies    No Known Allergies    Intolerances        SOCIAL HISTORY:  unable to obtain    FAMILY HISTORY:  FH: asthma: Son        ROS: 14 point ROS negative other than what is present in HPI or below  aphasic, expressive    Vital Signs Last 24 Hrs  T(C): 36.7 (16 Apr 2020 08:56), Max: 36.8 (15 Apr 2020 16:39)  T(F): 98 (16 Apr 2020 08:56), Max: 98.3 (15 Apr 2020 16:39)  HR: 99 (16 Apr 2020 12:54) (77 - 103)  BP: 166/77 (16 Apr 2020 12:54) (157/74 - 177/88)  BP(mean): --  RR: 18 (16 Apr 2020 12:54) (18 - 20)  SpO2: 99% (16 Apr 2020 12:54) (95% - 99%)      General: NAD    Detailed Neurologic Exam:    Mental status: The patient is awake and alert and has normal attention span.  The patient has expressive aphasia, follows commands in Vietnamese.     Cranial nerves: Pupils equal and react symmetrically to light. There is no visual field deficit to confrontation, right visual neglect. Extraocular motion is full with no nystagmus. There is no ptosis. Facial sensation is intact. Facial musculature is asymmetric, right central weakness. Palate elevates symmetrically.  Tongue is midline.    Motor: There is normal bulk and tone.  There is no tremor.  Strength is 3/5 in the right arm and 0-1/5  leg.   Strength is 5/5 in the left arm and leg.    Sensation: Intact to light touch and pin in 4 extremities, right neglect    Reflexes: 1+ throughout and plantar responses are flexor.    Cerebellar: There is no dysmetria on finger to nose testing on left    Gait : deferred    LABS:                         12.1   13.73 )-----------( 420      ( 16 Apr 2020 08:14 )             36.4       04-16    139  |  98  |  16.0  ----------------------------<  179<H>  3.3<L>   |  26.0  |  0.54    Ca    9.1      16 Apr 2020 08:14    TPro  6.4<L>  /  Alb  2.8<L>  /  TBili  0.6  /  DBili  x   /  AST  27  /  ALT  22  /  AlkPhos  118  04-16    RADIOLOGY & ADDITIONAL STUDIES (independently reviewed unless otherwise noted):  head CT- acute left frontal and parietal strokes, no blood   CTA- partially occluded left  ica, significant intracranial atherosclerosis, no clear aneurysm or AVM  CT Perfusion head - CBF<30% volume 6ml, Tmax>6s volume =9ml

## 2020-04-17 LAB
A1C WITH ESTIMATED AVERAGE GLUCOSE RESULT: 10 % — HIGH (ref 4–5.6)
ALBUMIN SERPL ELPH-MCNC: 2.7 G/DL — LOW (ref 3.3–5.2)
ALP SERPL-CCNC: 121 U/L — HIGH (ref 40–120)
ALT FLD-CCNC: 22 U/L — SIGNIFICANT CHANGE UP
ANION GAP SERPL CALC-SCNC: 14 MMOL/L — SIGNIFICANT CHANGE UP (ref 5–17)
AST SERPL-CCNC: 32 U/L — HIGH
BILIRUB SERPL-MCNC: 0.5 MG/DL — SIGNIFICANT CHANGE UP (ref 0.4–2)
BUN SERPL-MCNC: 19 MG/DL — SIGNIFICANT CHANGE UP (ref 8–20)
CALCIUM SERPL-MCNC: 9.4 MG/DL — SIGNIFICANT CHANGE UP (ref 8.6–10.2)
CHLORIDE SERPL-SCNC: 97 MMOL/L — LOW (ref 98–107)
CHOLEST SERPL-MCNC: 103 MG/DL — LOW (ref 110–199)
CO2 SERPL-SCNC: 27 MMOL/L — SIGNIFICANT CHANGE UP (ref 22–29)
CREAT SERPL-MCNC: 0.52 MG/DL — SIGNIFICANT CHANGE UP (ref 0.5–1.3)
CRP SERPL-MCNC: 11.02 MG/DL — HIGH (ref 0–0.4)
ESTIMATED AVERAGE GLUCOSE: 240 MG/DL — HIGH (ref 68–114)
GLUCOSE BLDC GLUCOMTR-MCNC: 181 MG/DL — HIGH (ref 70–99)
GLUCOSE BLDC GLUCOMTR-MCNC: 203 MG/DL — HIGH (ref 70–99)
GLUCOSE BLDC GLUCOMTR-MCNC: 226 MG/DL — HIGH (ref 70–99)
GLUCOSE BLDC GLUCOMTR-MCNC: 231 MG/DL — HIGH (ref 70–99)
GLUCOSE SERPL-MCNC: 126 MG/DL — HIGH (ref 70–99)
HCT VFR BLD CALC: 41 % — SIGNIFICANT CHANGE UP (ref 34.5–45)
HDLC SERPL-MCNC: 34 MG/DL — LOW
HGB BLD-MCNC: 13.5 G/DL — SIGNIFICANT CHANGE UP (ref 11.5–15.5)
LIPID PNL WITH DIRECT LDL SERPL: 40 MG/DL — SIGNIFICANT CHANGE UP
MCHC RBC-ENTMCNC: 28.7 PG — SIGNIFICANT CHANGE UP (ref 27–34)
MCHC RBC-ENTMCNC: 32.9 GM/DL — SIGNIFICANT CHANGE UP (ref 32–36)
MCV RBC AUTO: 87 FL — SIGNIFICANT CHANGE UP (ref 80–100)
PLATELET # BLD AUTO: 404 K/UL — HIGH (ref 150–400)
POTASSIUM SERPL-MCNC: 3.9 MMOL/L — SIGNIFICANT CHANGE UP (ref 3.5–5.3)
POTASSIUM SERPL-SCNC: 3.9 MMOL/L — SIGNIFICANT CHANGE UP (ref 3.5–5.3)
PROT SERPL-MCNC: 6.8 G/DL — SIGNIFICANT CHANGE UP (ref 6.6–8.7)
RBC # BLD: 4.71 M/UL — SIGNIFICANT CHANGE UP (ref 3.8–5.2)
RBC # FLD: 14.4 % — SIGNIFICANT CHANGE UP (ref 10.3–14.5)
SODIUM SERPL-SCNC: 138 MMOL/L — SIGNIFICANT CHANGE UP (ref 135–145)
TOTAL CHOLESTEROL/HDL RATIO MEASUREMENT: 3 RATIO — LOW (ref 3.3–7.1)
TRIGL SERPL-MCNC: 146 MG/DL — SIGNIFICANT CHANGE UP (ref 10–200)
WBC # BLD: 14.09 K/UL — HIGH (ref 3.8–10.5)
WBC # FLD AUTO: 14.09 K/UL — HIGH (ref 3.8–10.5)

## 2020-04-17 PROCEDURE — 99232 SBSQ HOSP IP/OBS MODERATE 35: CPT

## 2020-04-17 PROCEDURE — 93308 TTE F-UP OR LMTD: CPT | Mod: 26

## 2020-04-17 PROCEDURE — 71045 X-RAY EXAM CHEST 1 VIEW: CPT | Mod: 26

## 2020-04-17 RX ADMIN — Medication 25 MILLIGRAM(S): at 18:31

## 2020-04-17 RX ADMIN — Medication 1 SPRAY(S): at 22:35

## 2020-04-17 RX ADMIN — Medication 2: at 17:30

## 2020-04-17 RX ADMIN — FAMOTIDINE 20 MILLIGRAM(S): 10 INJECTION INTRAVENOUS at 13:54

## 2020-04-17 RX ADMIN — INSULIN GLARGINE 25 UNIT(S): 100 INJECTION, SOLUTION SUBCUTANEOUS at 21:29

## 2020-04-17 RX ADMIN — Medication 1 SPRAY(S): at 14:04

## 2020-04-17 RX ADMIN — SIMVASTATIN 20 MILLIGRAM(S): 20 TABLET, FILM COATED ORAL at 21:29

## 2020-04-17 RX ADMIN — Medication 6 UNIT(S): at 08:54

## 2020-04-17 RX ADMIN — Medication 100 MILLIGRAM(S): at 21:29

## 2020-04-17 RX ADMIN — ENOXAPARIN SODIUM 70 MILLIGRAM(S): 100 INJECTION SUBCUTANEOUS at 10:54

## 2020-04-17 RX ADMIN — ENOXAPARIN SODIUM 70 MILLIGRAM(S): 100 INJECTION SUBCUTANEOUS at 18:30

## 2020-04-17 RX ADMIN — Medication 6 UNIT(S): at 19:31

## 2020-04-17 RX ADMIN — Medication 1 SPRAY(S): at 17:41

## 2020-04-17 RX ADMIN — Medication 100 MILLIGRAM(S): at 18:00

## 2020-04-17 RX ADMIN — Medication 1 SPRAY(S): at 00:10

## 2020-04-17 RX ADMIN — Medication 1: at 08:50

## 2020-04-17 NOTE — PHYSICAL THERAPY INITIAL EVALUATION ADULT - PERTINENT HX OF CURRENT PROBLEM, REHAB EVAL
69yoF hx HTN, HLD, DM, GERD presenting with 5 days history of subjective fever and chills, dry cough, pleuritic chest pain and dyspnea at rest and on exertion.  Pt also endorsing some nausea and vomiting. Admitted for Acute hypoxemic respiratory failure due to COVID PNA. On 4/16, pt demonstrating expressive aphasia and right sided weakness, Code stroke called, CT reveals left frontal/parietal CVA

## 2020-04-17 NOTE — PHYSICAL THERAPY INITIAL EVALUATION ADULT - ADDITIONAL COMMENTS
Hx taken via yes/no questions, pt unable to verbalize social hx. Pt lives in an apartment with her spouse and 2 children. No steps to navigate. Pt was independent PTA without assist device. Pt does not own DME.

## 2020-04-17 NOTE — PROGRESS NOTE ADULT - SUBJECTIVE AND OBJECTIVE BOX
[facility]                                        Neurology at [place]                                 Chayito Jean, & Jose                                  [address]                                        [place], [place], [address]                                             [phone]        CC: Stroke    HPI:   The patient is a 69y Female who presented with acute aphasia and right sided weakness today.  Likely last know  well was at 0730 when he nurse made initial assessment and said she was speaking, but full evaluation not performed.  At about 1055 she was noted to have her symptoms of aphasia and right sided weakness.  She had a head CT done which showed left frontal/parietal CVA.  There was a delay in getting her CTA/P due to difficulty finding a vein for a line of adequate size for CT-A.  Once done and reviewed, there was ICU stenosis, likely from atherosclerosis, narrow but not occluded.  CTP was further delayed because of difficulty sending images to [facility] to be processed.  Once these images were obtained it showed a small almost entirely completed stroke.  It was decided not to intervene due to the fact that there was no significant penumbra seen on CTP.  Alteplase was not given due to the timing not being clear, that there was a completed stroke on head CT.     Interim history:  Remains on 4 [place].     ROS:   Unobtainable due to patient's condition.     MEDICATIONS  (STANDING):  amLODIPine   Tablet 5 milliGRAM(s) Oral daily  benzonatate 100 milliGRAM(s) Oral every 8 hours  dextrose 5%. 1000 milliLiter(s) (50 mL/Hr) IV Continuous <Continuous>  doxycycline hyclate Capsule 100 milliGRAM(s) Oral every 12 hours  enoxaparin Injectable 70 milliGRAM(s) SubCutaneous every 12 hours  famotidine    Tablet 20 milliGRAM(s) Oral daily  hydrALAZINE 50 milliGRAM(s) Oral daily  insulin glargine Injectable (LANTUS) 25 Unit(s) SubCutaneous at bedtime  insulin lispro (HumaLOG) corrective regimen sliding scale   SubCutaneous three times a day before meals  insulin lispro (HumaLOG) corrective regimen sliding scale   SubCutaneous at bedtime  insulin lispro Injectable (HumaLOG) 6 Unit(s) SubCutaneous three times a day before meals  metoprolol tartrate 25 milliGRAM(s) Oral every 12 hours  simvastatin 20 milliGRAM(s) Oral at bedtime  sodium chloride 0.65% Nasal 1 Spray(s) Both Nostrils four times a day  sodium chloride 0.9% Bolus 250 milliLiter(s) IV Bolus once  sodium chloride 0.9%. 500 milliLiter(s) (100 mL/Hr) IV Continuous <Continuous>      Vital Signs Last 24 Hrs  T(C): 36.6 (17 Apr 2020 09:48), Max: 36.7 (16 Apr 2020 23:16)  T(F): 97.9 (17 Apr 2020 09:48), Max: 98 (16 Apr 2020 23:16)  HR: 76 (17 Apr 2020 09:48) (76 - 99)  BP: 135/68 (17 Apr 2020 09:48) (135/68 - 173/83)  RR: 18 (17 Apr 2020 09:48) (16 - 18)  SpO2: 99% (17 Apr 2020 09:48) (95% - 99%)    Detailed Neurologic Exam:    Mental status: The patient is awake and alert. She is globally aphasic. She is essentially mute and does not follow instructions (given in Luxembour[place] or English).     Cranial nerves: Pupils equal and react symmetrically to light. She blinks to threat. She tracks with gaze. Facial sensation cannot be assessed Facial musculature is asymmetric with a depression of the right nasolabial fold. Palate elevates symmetrically. Tongue is midline.    Motor/Sensory: There is normal bulk and tone.  There is no tremor.  Moving left to stimuli.   Minimal movement on right.    Reflexes: 1+ throughout and plantar responses are flexor.    Cerebellar: Cannot be assessed.     Labs:     04-17    138  |  97<L>  |  19.0  ----------------------------<  126<H>  3.9   |  27.0  |  0.52    Ca    9.4      17 Apr 2020 07:47    TPro  6.8  /  Alb  2.7<L>  /  TBili  0.5  /  DBili  x   /  AST  32<H>  /  ALT  22  /  AlkPhos  121<H>  04-17                            13.5   14.09 )-----------( 404      ( 17 Apr 2020 07:47 )             41.0     CRP: 11.02  Ferritin: 816  LDL: 40    Rad:   ***

## 2020-04-17 NOTE — PROGRESS NOTE ADULT - ASSESSMENT
69y Female who is followed by neurology because of stroke    Acute stroke in COVID patient  May be hypercoagulable due to COVID-19.  Continue Lovenox 1mg/kg bid- this will also provide DVT prophylaxis.  stroke orders  written  LDL: 40  At goal (<70).    PT/OT/Speech/swallow    Covid-19  Per medicine.     Discharge planning.   Eventual rehab when stable.

## 2020-04-17 NOTE — CHART NOTE - NSCHARTNOTEFT_GEN_A_CORE
Source: Patient [ ]  Family [ ]   other [x ]    Current Diet: Diet, Consistent Carbohydrate w/Evening Snack:   DASH/TLC {Sodium & Cholesteral Restricted} (04-09-20 @ 02:40)    PO intake:  Aware pt with poor po intake upon admission    Current Weight:   (4/8) 160#    % Weight Change - no new wt to assess, will continue to monitor.  No edema noted per documenation    Pertinent Medications: MEDICATIONS  (STANDING):  amLODIPine   Tablet 5 milliGRAM(s) Oral daily  benzonatate 100 milliGRAM(s) Oral every 8 hours  dextrose 5%. 1000 milliLiter(s) (50 mL/Hr) IV Continuous <Continuous>  dextrose 50% Injectable 12.5 Gram(s) IV Push once  dextrose 50% Injectable 25 Gram(s) IV Push once  dextrose 50% Injectable 25 Gram(s) IV Push once  doxycycline hyclate Capsule 100 milliGRAM(s) Oral every 12 hours  enoxaparin Injectable 70 milliGRAM(s) SubCutaneous every 12 hours  famotidine    Tablet 20 milliGRAM(s) Oral daily  hydrALAZINE 50 milliGRAM(s) Oral daily  insulin glargine Injectable (LANTUS) 25 Unit(s) SubCutaneous at bedtime  insulin lispro (HumaLOG) corrective regimen sliding scale   SubCutaneous three times a day before meals  insulin lispro (HumaLOG) corrective regimen sliding scale   SubCutaneous at bedtime  insulin lispro Injectable (HumaLOG) 6 Unit(s) SubCutaneous three times a day before meals  metoprolol tartrate 25 milliGRAM(s) Oral every 12 hours  simvastatin 20 milliGRAM(s) Oral at bedtime  sodium chloride 0.65% Nasal 1 Spray(s) Both Nostrils four times a day  sodium chloride 0.9% Bolus 250 milliLiter(s) IV Bolus once  sodium chloride 0.9%. 500 milliLiter(s) (100 mL/Hr) IV Continuous <Continuous>    MEDICATIONS  (PRN):  acetaminophen   Tablet .. 650 milliGRAM(s) Oral every 6 hours PRN Temp greater or equal to 38C (100.4F), Mild Pain (1 - 3), Moderate Pain (4 - 6)  dextrose 40% Gel 15 Gram(s) Oral once PRN Blood Glucose LESS THAN 70 milliGRAM(s)/deciliter  glucagon  Injectable 1 milliGRAM(s) IntraMuscular once PRN Glucose LESS THAN 70 milligrams/deciliter    Pertinent Labs: CBC Full  -  ( 17 Apr 2020 07:47 )  WBC Count : 14.09 K/uL  RBC Count : 4.71 M/uL  Hemoglobin : 13.5 g/dL  Hematocrit : 41.0 %  Platelet Count - Automated : 404 K/uL  Mean Cell Volume : 87.0 fl  Mean Cell Hemoglobin : 28.7 pg  Mean Cell Hemoglobin Concentration : 32.9 gm/dL  04-17 Na138 mmol/L Glu 126 mg/dL<H> K+ 3.9 mmol/L Cr  0.52 mg/dL BUN 19.0 mg/dL Phos n/a   Alb 2.7 g/dL<L> CRP 11.02    Skin: no skin breakdown noted    Current Nutrition Diagnosis: Pt remains at nutrition risk secondary to increased nutrient needs related to increased physiologic demand with healing as evidenced by pt with acute hypoxic respiratory failure due to COVID-19, poor po intake upon admission and now s/p code stroke (4/16).  Pt currently aphasic, plan is for swallow evaluation.    Recommendations:   Swallow Evaluation  Resume DASH/TLC, cons cho diet- consistency per SLP recommendations  RX: MVI daily   Monitor daily wts     Monitoring and Evaluation:   [x ] PO intake [ x] Tolerance to diet prescription [X] Weights  [X] Follow up per protocol [X] Labs:

## 2020-04-17 NOTE — PHYSICAL THERAPY INITIAL EVALUATION ADULT - ORIENTATION, REHAB EVAL
place/person/multiple attempts made to assess orientation to time, Pt inconsistent with correct response.

## 2020-04-17 NOTE — OCCUPATIONAL THERAPY INITIAL EVALUATION ADULT - PLANNED THERAPY INTERVENTIONS, OT EVAL
balance training/motor coordination training/neuromuscular re-education/ROM/strengthening/ADL retraining/bed mobility training/transfer training

## 2020-04-17 NOTE — PHYSICAL THERAPY INITIAL EVALUATION ADULT - IMPAIRMENTS CONTRIBUTING IMPAIRED BED MOBILITY, REHAB EVAL
impaired balance/impaired motor control/impaired coordination/impaired postural control/decreased strength

## 2020-04-17 NOTE — PHYSICAL THERAPY INITIAL EVALUATION ADULT - PLANNED THERAPY INTERVENTIONS, PT EVAL
ROM/balance training/bed mobility training/transfer training/gait training/postural re-education/strengthening

## 2020-04-17 NOTE — PROGRESS NOTE ADULT - SUBJECTIVE AND OBJECTIVE BOX
Pt had an acute cva and has aphasia and right hemiparesis.  Had PT eval today.      oe:  cvs:  s1, s2         rs:  cta bilaterally         gi:  soft, non tender         ext:  no cyanosis, clubbing, edema         neuro:  aphasia, right hemiparesis    a/p:  COVID - continue present mgmt          cva - PT eval completed, may need in patient rehab          hyperlipidemia - cont present mgmt          dm - continue present mgmt

## 2020-04-17 NOTE — PHYSICAL THERAPY INITIAL EVALUATION ADULT - IMPAIRMENTS FOUND, PT EVAL
decreased midline orientation/ventilation and respiration/gas exchange/gait, locomotion, and balance/muscle strength/ROM Bilateral Helical Rim Advancement Flap Text: The defect edges were debeveled with a #15 blade scalpel.  Given the location of the defect and the proximity to free margins (helical rim) a bilateral helical rim advancement flap was deemed most appropriate.  Using a sterile surgical marker, the appropriate advancement flaps were drawn incorporating the defect and placing the expected incisions between the helical rim and antihelix where possible.  The area thus outlined was incised through and through with a #15 scalpel blade.  With a skin hook and iris scissors, the flaps were gently and sharply undermined and freed up.

## 2020-04-18 LAB
ALBUMIN SERPL ELPH-MCNC: 2.9 G/DL — LOW (ref 3.3–5.2)
ALP SERPL-CCNC: 117 U/L — SIGNIFICANT CHANGE UP (ref 40–120)
ALT FLD-CCNC: 20 U/L — SIGNIFICANT CHANGE UP
ANION GAP SERPL CALC-SCNC: 15 MMOL/L — SIGNIFICANT CHANGE UP (ref 5–17)
AST SERPL-CCNC: 27 U/L — SIGNIFICANT CHANGE UP
BILIRUB SERPL-MCNC: 0.5 MG/DL — SIGNIFICANT CHANGE UP (ref 0.4–2)
BUN SERPL-MCNC: 19 MG/DL — SIGNIFICANT CHANGE UP (ref 8–20)
CALCIUM SERPL-MCNC: 9.3 MG/DL — SIGNIFICANT CHANGE UP (ref 8.6–10.2)
CHLORIDE SERPL-SCNC: 101 MMOL/L — SIGNIFICANT CHANGE UP (ref 98–107)
CO2 SERPL-SCNC: 27 MMOL/L — SIGNIFICANT CHANGE UP (ref 22–29)
CREAT SERPL-MCNC: 0.45 MG/DL — LOW (ref 0.5–1.3)
GLUCOSE BLDC GLUCOMTR-MCNC: 138 MG/DL — HIGH (ref 70–99)
GLUCOSE BLDC GLUCOMTR-MCNC: 174 MG/DL — HIGH (ref 70–99)
GLUCOSE BLDC GLUCOMTR-MCNC: 196 MG/DL — HIGH (ref 70–99)
GLUCOSE BLDC GLUCOMTR-MCNC: 298 MG/DL — HIGH (ref 70–99)
GLUCOSE SERPL-MCNC: 170 MG/DL — HIGH (ref 70–99)
HCT VFR BLD CALC: 39.8 % — SIGNIFICANT CHANGE UP (ref 34.5–45)
HGB BLD-MCNC: 13.1 G/DL — SIGNIFICANT CHANGE UP (ref 11.5–15.5)
MCHC RBC-ENTMCNC: 28.2 PG — SIGNIFICANT CHANGE UP (ref 27–34)
MCHC RBC-ENTMCNC: 32.9 GM/DL — SIGNIFICANT CHANGE UP (ref 32–36)
MCV RBC AUTO: 85.6 FL — SIGNIFICANT CHANGE UP (ref 80–100)
PLATELET # BLD AUTO: 462 K/UL — HIGH (ref 150–400)
POTASSIUM SERPL-MCNC: 3.7 MMOL/L — SIGNIFICANT CHANGE UP (ref 3.5–5.3)
POTASSIUM SERPL-SCNC: 3.7 MMOL/L — SIGNIFICANT CHANGE UP (ref 3.5–5.3)
PROT SERPL-MCNC: 6.8 G/DL — SIGNIFICANT CHANGE UP (ref 6.6–8.7)
RBC # BLD: 4.65 M/UL — SIGNIFICANT CHANGE UP (ref 3.8–5.2)
RBC # FLD: 14.3 % — SIGNIFICANT CHANGE UP (ref 10.3–14.5)
SODIUM SERPL-SCNC: 143 MMOL/L — SIGNIFICANT CHANGE UP (ref 135–145)
WBC # BLD: 14.38 K/UL — HIGH (ref 3.8–10.5)
WBC # FLD AUTO: 14.38 K/UL — HIGH (ref 3.8–10.5)

## 2020-04-18 PROCEDURE — 99232 SBSQ HOSP IP/OBS MODERATE 35: CPT

## 2020-04-18 RX ADMIN — SIMVASTATIN 20 MILLIGRAM(S): 20 TABLET, FILM COATED ORAL at 22:45

## 2020-04-18 RX ADMIN — Medication 100 MILLIGRAM(S): at 04:26

## 2020-04-18 RX ADMIN — Medication 50 MILLIGRAM(S): at 04:26

## 2020-04-18 RX ADMIN — Medication 6 UNIT(S): at 09:38

## 2020-04-18 RX ADMIN — Medication 1 SPRAY(S): at 04:26

## 2020-04-18 RX ADMIN — Medication 1 SPRAY(S): at 13:29

## 2020-04-18 RX ADMIN — FAMOTIDINE 20 MILLIGRAM(S): 10 INJECTION INTRAVENOUS at 22:45

## 2020-04-18 RX ADMIN — ENOXAPARIN SODIUM 70 MILLIGRAM(S): 100 INJECTION SUBCUTANEOUS at 04:26

## 2020-04-18 RX ADMIN — Medication 25 MILLIGRAM(S): at 04:26

## 2020-04-18 RX ADMIN — Medication 25 MILLIGRAM(S): at 17:44

## 2020-04-18 RX ADMIN — ENOXAPARIN SODIUM 70 MILLIGRAM(S): 100 INJECTION SUBCUTANEOUS at 17:44

## 2020-04-18 RX ADMIN — Medication 1: at 17:43

## 2020-04-18 RX ADMIN — Medication 1: at 09:38

## 2020-04-18 RX ADMIN — INSULIN GLARGINE 25 UNIT(S): 100 INJECTION, SOLUTION SUBCUTANEOUS at 22:45

## 2020-04-18 RX ADMIN — Medication 1 SPRAY(S): at 22:46

## 2020-04-18 RX ADMIN — Medication 6 UNIT(S): at 13:29

## 2020-04-18 RX ADMIN — Medication 100 MILLIGRAM(S): at 22:45

## 2020-04-18 RX ADMIN — Medication 3: at 13:29

## 2020-04-18 RX ADMIN — Medication 100 MILLIGRAM(S): at 13:30

## 2020-04-18 RX ADMIN — AMLODIPINE BESYLATE 5 MILLIGRAM(S): 2.5 TABLET ORAL at 04:26

## 2020-04-18 RX ADMIN — Medication 6 UNIT(S): at 17:44

## 2020-04-18 NOTE — PROGRESS NOTE ADULT - SUBJECTIVE AND OBJECTIVE BOX
Pt remains unchanged.  Awaiting evaluation for transfer to acute rehab.  Denies pain or discomfort.    oe:  cvs:  s1, s2         rs:  cta bilaterally         gi:  soft, non tender         ext:  no cyanosis, clubbing, edema         neuro - right hemiparesis, aphasia    a/p:  cva - continue PT/OT - acute rehab placement          htn - controlled          hyperlipidemia - cont present mgmt          covid pneumonia - treated

## 2020-04-18 NOTE — PROGRESS NOTE ADULT - ASSESSMENT
69y Female who is followed by neurology because of stroke    Acute stroke in COVID patient  May be hypercoagulable due to COVID-19.  Hypercoagulable studies pending.   Continue Lovenox 1mg/kg bid- this will also provide DVT prophylaxis.  LDL: 40  At goal (<70).  Antiplatelet/statin.  Mobilize with physical therapy.   Seen by PT recommending acute rehab.     Covid-19  Per medicine.     Discharge planning.   Eventual rehab when stable.     Discussed with ALEXIS Munguia.

## 2020-04-18 NOTE — PROGRESS NOTE ADULT - SUBJECTIVE AND OBJECTIVE BOX
[facility]                                        Neurology at [place]                                 Chayito Jean, & Jose                                  [address]                                        [place], [place], [address]                                             [phone]        CC: Stroke    HPI:   The patient is a 69y Female who presented with acute aphasia and right sided weakness today.  Likely last know  well was at 0730 when he nurse made initial assessment and said she was speaking, but full evaluation not performed.  At about 1055 she was noted to have her symptoms of aphasia and right sided weakness.  She had a head CT done which showed left frontal/parietal CVA.  There was a delay in getting her CTA/P due to difficulty finding a vein for a line of adequate size for CT-A.  Once done and reviewed, there was ICU stenosis, likely from atherosclerosis, narrow but not occluded.  CTP was further delayed because of difficulty sending images to [facility] to be processed.  Once these images were obtained it showed a small almost entirely completed stroke.  It was decided not to intervene due to the fact that there was no significant penumbra seen on CTP.  Alteplase was not given due to the timing not being clear, that there was a completed stroke on head CT.     Interim history:  Remains on 4 [place].     ROS:   Unobtainable due to patient's condition.     MEDICATIONS  (STANDING):  amLODIPine   Tablet 5 milliGRAM(s) Oral daily  benzonatate 100 milliGRAM(s) Oral every 8 hours  dextrose 5%. 1000 milliLiter(s) (50 mL/Hr) IV Continuous <Continuous  doxycycline hyclate Capsule 100 milliGRAM(s) Oral every 12 hours  enoxaparin Injectable 70 milliGRAM(s) SubCutaneous every 12 hours  famotidine    Tablet 20 milliGRAM(s) Oral daily  hydrALAZINE 50 milliGRAM(s) Oral daily  insulin glargine Injectable (LANTUS) 25 Unit(s) SubCutaneous at bedtime  insulin lispro (HumaLOG) corrective regimen sliding scale   SubCutaneous three times a day before meals  insulin lispro (HumaLOG) corrective regimen sliding scale   SubCutaneous at bedtime  insulin lispro Injectable (HumaLOG) 6 Unit(s) SubCutaneous three times a day before meals  metoprolol tartrate 25 milliGRAM(s) Oral every 12 hours  simvastatin 20 milliGRAM(s) Oral at bedtime  sodium chloride 0.65% Nasal 1 Spray(s) Both Nostrils four times a day  sodium chloride 0.9% Bolus 250 milliLiter(s) IV Bolus once  sodium chloride 0.9%. 500 milliLiter(s) (100 mL/Hr) IV Continuous <Continuous>      Vital Signs Last 24 Hrs  T(C): 36.8 (18 Apr 2020 04:24), Max: 36.8 (17 Apr 2020 15:34)  T(F): 98.3 (18 Apr 2020 04:24), Max: 98.3 (17 Apr 2020 15:34)  HR: 92 (18 Apr 2020 04:24) (92 - 93)  BP: 124/82 (18 Apr 2020 05:20) (124/82 - 185/86)  RR: 18 (18 Apr 2020 04:24) (18 - 18)  SpO2: 94% (18 Apr 2020 04:24) (93% - 94%)    Detailed Neurologic Exam:    Mental status: The patient is awake and alert. She is globally aphasic. She is essentially mute and does not follow instructions (given in [place]ian or English).     Cranial nerves: Pupils equal and react symmetrically to light. She blinks to threat. She tracks with gaze. Facial sensation cannot be assessed Facial musculature is asymmetric with a depression of the right nasolabial fold. Palate elevates symmetrically. Tongue is midline.    Motor/Sensory: There is normal bulk and tone.  There is no tremor.  Moving left to stimuli.   Minimal movement on right.    Reflexes: 1+ throughout and plantar responses are flexor.    Cerebellar: Cannot be assessed.     Labs:     04-18    143  |  101  |  19.0  ----------------------------<  170<H>  3.7   |  27.0  |  0.45<L>    Ca    9.3      18 Apr 2020 07:04    TPro  6.8  /  Alb  2.9<L>  /  TBili  0.5  /  DBili  x   /  AST  27  /  ALT  20  /  AlkPhos  117  04-18                            13.1   14.38 )-----------( 462      ( 18 Apr 2020 07:04 )             39.8

## 2020-04-19 LAB
ALBUMIN SERPL ELPH-MCNC: 2.9 G/DL — LOW (ref 3.3–5.2)
ALP SERPL-CCNC: 96 U/L — SIGNIFICANT CHANGE UP (ref 40–120)
ALT FLD-CCNC: 17 U/L — SIGNIFICANT CHANGE UP
ANION GAP SERPL CALC-SCNC: 14 MMOL/L — SIGNIFICANT CHANGE UP (ref 5–17)
AST SERPL-CCNC: 24 U/L — SIGNIFICANT CHANGE UP
BILIRUB SERPL-MCNC: 0.5 MG/DL — SIGNIFICANT CHANGE UP (ref 0.4–2)
BUN SERPL-MCNC: 15 MG/DL — SIGNIFICANT CHANGE UP (ref 8–20)
CALCIUM SERPL-MCNC: 9.4 MG/DL — SIGNIFICANT CHANGE UP (ref 8.6–10.2)
CHLORIDE SERPL-SCNC: 99 MMOL/L — SIGNIFICANT CHANGE UP (ref 98–107)
CO2 SERPL-SCNC: 27 MMOL/L — SIGNIFICANT CHANGE UP (ref 22–29)
CREAT SERPL-MCNC: 0.43 MG/DL — LOW (ref 0.5–1.3)
GLUCOSE BLDC GLUCOMTR-MCNC: 160 MG/DL — HIGH (ref 70–99)
GLUCOSE BLDC GLUCOMTR-MCNC: 169 MG/DL — HIGH (ref 70–99)
GLUCOSE BLDC GLUCOMTR-MCNC: 191 MG/DL — HIGH (ref 70–99)
GLUCOSE BLDC GLUCOMTR-MCNC: 230 MG/DL — HIGH (ref 70–99)
GLUCOSE SERPL-MCNC: 204 MG/DL — HIGH (ref 70–99)
HCT VFR BLD CALC: 38.1 % — SIGNIFICANT CHANGE UP (ref 34.5–45)
HCYS SERPL-MCNC: 7.6 UMOL/L — SIGNIFICANT CHANGE UP
HGB BLD-MCNC: 12.7 G/DL — SIGNIFICANT CHANGE UP (ref 11.5–15.5)
MCHC RBC-ENTMCNC: 28.7 PG — SIGNIFICANT CHANGE UP (ref 27–34)
MCHC RBC-ENTMCNC: 33.3 GM/DL — SIGNIFICANT CHANGE UP (ref 32–36)
MCV RBC AUTO: 86.2 FL — SIGNIFICANT CHANGE UP (ref 80–100)
PLATELET # BLD AUTO: 471 K/UL — HIGH (ref 150–400)
POTASSIUM SERPL-MCNC: 3.7 MMOL/L — SIGNIFICANT CHANGE UP (ref 3.5–5.3)
POTASSIUM SERPL-SCNC: 3.7 MMOL/L — SIGNIFICANT CHANGE UP (ref 3.5–5.3)
PROT SERPL-MCNC: 6.9 G/DL — SIGNIFICANT CHANGE UP (ref 6.6–8.7)
RBC # BLD: 4.42 M/UL — SIGNIFICANT CHANGE UP (ref 3.8–5.2)
RBC # FLD: 14.2 % — SIGNIFICANT CHANGE UP (ref 10.3–14.5)
SODIUM SERPL-SCNC: 139 MMOL/L — SIGNIFICANT CHANGE UP (ref 135–145)
WBC # BLD: 13.54 K/UL — HIGH (ref 3.8–10.5)
WBC # FLD AUTO: 13.54 K/UL — HIGH (ref 3.8–10.5)

## 2020-04-19 PROCEDURE — 99232 SBSQ HOSP IP/OBS MODERATE 35: CPT

## 2020-04-19 RX ORDER — ASPIRIN/CALCIUM CARB/MAGNESIUM 324 MG
81 TABLET ORAL DAILY
Refills: 0 | Status: DISCONTINUED | OUTPATIENT
Start: 2020-04-19 | End: 2020-04-23

## 2020-04-19 RX ADMIN — Medication 1: at 13:05

## 2020-04-19 RX ADMIN — Medication 1 SPRAY(S): at 06:40

## 2020-04-19 RX ADMIN — ENOXAPARIN SODIUM 70 MILLIGRAM(S): 100 INJECTION SUBCUTANEOUS at 06:40

## 2020-04-19 RX ADMIN — INSULIN GLARGINE 25 UNIT(S): 100 INJECTION, SOLUTION SUBCUTANEOUS at 21:35

## 2020-04-19 RX ADMIN — Medication 100 MILLIGRAM(S): at 21:36

## 2020-04-19 RX ADMIN — Medication 1: at 18:19

## 2020-04-19 RX ADMIN — Medication 6 UNIT(S): at 13:05

## 2020-04-19 RX ADMIN — Medication 1 SPRAY(S): at 17:14

## 2020-04-19 RX ADMIN — Medication 100 MILLIGRAM(S): at 13:05

## 2020-04-19 RX ADMIN — Medication 50 MILLIGRAM(S): at 06:40

## 2020-04-19 RX ADMIN — Medication 6 UNIT(S): at 18:19

## 2020-04-19 RX ADMIN — ENOXAPARIN SODIUM 70 MILLIGRAM(S): 100 INJECTION SUBCUTANEOUS at 18:18

## 2020-04-19 RX ADMIN — Medication 1 SPRAY(S): at 12:16

## 2020-04-19 RX ADMIN — Medication 25 MILLIGRAM(S): at 06:40

## 2020-04-19 RX ADMIN — Medication 25 MILLIGRAM(S): at 18:18

## 2020-04-19 RX ADMIN — FAMOTIDINE 20 MILLIGRAM(S): 10 INJECTION INTRAVENOUS at 13:05

## 2020-04-19 RX ADMIN — SIMVASTATIN 20 MILLIGRAM(S): 20 TABLET, FILM COATED ORAL at 21:34

## 2020-04-19 RX ADMIN — Medication 100 MILLIGRAM(S): at 06:40

## 2020-04-19 RX ADMIN — AMLODIPINE BESYLATE 5 MILLIGRAM(S): 2.5 TABLET ORAL at 06:40

## 2020-04-19 NOTE — PROGRESS NOTE ADULT - SUBJECTIVE AND OBJECTIVE BOX
[facility]                                        Neurology at [place]                                 Chayito Jean, & Jose                                  [address]                                        [place], [place], [address]                                             [phone]        CC: Stroke    HPI:   The patient is a 69y Female who presented with acute aphasia and right sided weakness today.  Likely last know  well was at 0730 when he nurse made initial assessment and said she was speaking, but full evaluation not performed.  At about 1055 she was noted to have her symptoms of aphasia and right sided weakness.  She had a head CT done which showed left frontal/parietal CVA.  There was a delay in getting her CTA/P due to difficulty finding a vein for a line of adequate size for CT-A.  Once done and reviewed, there was ICU stenosis, likely from atherosclerosis, narrow but not occluded.  CTP was further delayed because of difficulty sending images to [facility] to be processed.  Once these images were obtained it showed a small almost entirely completed stroke.  It was decided not to intervene due to the fact that there was no significant penumbra seen on CTP.  Alteplase was not given due to the timing not being clear, that there was a completed stroke on head CT.     Interim history:  Remains on 4 [place].   Improved speech although still with difficulty.    ROS:   Unobtainable due to patient's condition.     MEDICATIONS  (STANDING):  amLODIPine   Tablet 5 milliGRAM(s) Oral daily  benzonatate 100 milliGRAM(s) Oral every 8 hours  dextrose 5%. 1000 milliLiter(s) (50 mL/Hr) IV Continuous <Continuous>  doxycycline hyclate Capsule 100 milliGRAM(s) Oral every 12 hours  enoxaparin Injectable 70 milliGRAM(s) SubCutaneous every 12 hours  famotidine    Tablet 20 milliGRAM(s) Oral daily  hydrALAZINE 50 milliGRAM(s) Oral daily  insulin glargine Injectable (LANTUS) 25 Unit(s) SubCutaneous at bedtime  insulin lispro (HumaLOG) corrective regimen sliding scale   SubCutaneous three times a day before meals  insulin lispro (HumaLOG) corrective regimen sliding scale   SubCutaneous at bedtime  insulin lispro Injectable (HumaLOG) 6 Unit(s) SubCutaneous three times a day before meals  metoprolol tartrate 25 milliGRAM(s) Oral every 12 hours  simvastatin 20 milliGRAM(s) Oral at bedtime  sodium chloride 0.65% Nasal 1 Spray(s) Both Nostrils four times a day  sodium chloride 0.9% Bolus 250 milliLiter(s) IV Bolus once  sodium chloride 0.9%. 500 milliLiter(s) (100 mL/Hr) IV Continuous <Continuous>      Vital Signs Last 24 Hrs  T(C): 36.6 (19 Apr 2020 06:38), Max: 36.6 (18 Apr 2020 13:35)  T(F): 97.8 (19 Apr 2020 06:38), Max: 97.9 (18 Apr 2020 13:35)  HR: 83 (19 Apr 2020 06:38) (71 - 96)  BP: 152/94 (19 Apr 2020 06:38) (152/78 - 160/80)  RR: 18 (19 Apr 2020 06:38) (18 - 18)  SpO2: 96% (19 Apr 2020 06:38) (93% - 98%)    Detailed Neurologic Exam:    Mental status: The patient is awake and alert. There is both expressive and receptive difficulty although she is more verbal today.   She is able to give her name and attempts to answer questions. She follows simple instructions ([place] only) with the left side.     Cranial nerves: Pupils equal and react symmetrically to light. She blinks to threat. She tracks with gaze. Facial sensation cannot be assessed Facial musculature is asymmetric with a depression of the right nasolabial fold. Palate elevates symmetrically. Tongue is midline.    Motor/Sensory: There is normal bulk and tone.  There is no tremor.  Moving left to stimuli.   Minimal movement on right.    Reflexes: 1+ throughout and plantar responses are flexor.    Cerebellar: Cannot be assessed.     Labs:     04-19    139  |  99  |  15.0  ----------------------------<  204<H>  3.7   |  27.0  |  0.43<L>    Ca    9.4      19 Apr 2020 08:32    TPro  6.9  /  Alb  2.9<L>  /  TBili  0.5  /  DBili  x   /  AST  24  /  ALT  17  /  AlkPhos  96  04-19                            12.7   13.54 )-----------( 471      ( 19 Apr 2020 08:32 )             38.1

## 2020-04-19 NOTE — PROGRESS NOTE ADULT - SUBJECTIVE AND OBJECTIVE BOX
Pt remains unchanged.  Awaiting evaluation for acute rehab.    oe:  cvs:  s1, s2         rs:  cta bilaterally         gi:  soft, non tender        ext:  no cyanosis, clubbing, edema         neuro - right hemiparesis, aphasia    a/p:  cva - continue full dose lovenox - awaiting acute rehab placement          htn - continue present mgmt          dm - continue present mgmt          dc to acute rehab

## 2020-04-19 NOTE — PROGRESS NOTE ADULT - ASSESSMENT
69y Female who is followed by neurology because of stroke    Acute stroke in COVID patient  May be hypercoagulable due to COVID-19.  Hypercoagulable studies pending.   Continue Lovenox 1mg/kg bid- this will also provide DVT prophylaxis.  LDL: 40  At goal (<70).  Antiplatelet/statin.  Mobilize with physical therapy.   Seen by PT recommending acute rehab.     Covid-19  Per medicine.     Discharge planning.   Eventual rehab when stable.   Would continue Lovenox at rehab.     No further specific neurologic recommendations. Detail Level: Zone

## 2020-04-20 LAB
APPEARANCE UR: CLEAR — SIGNIFICANT CHANGE UP
BACTERIA # UR AUTO: NEGATIVE — SIGNIFICANT CHANGE UP
BILIRUB UR-MCNC: NEGATIVE — SIGNIFICANT CHANGE UP
COLOR SPEC: YELLOW — SIGNIFICANT CHANGE UP
DIFF PNL FLD: NEGATIVE — SIGNIFICANT CHANGE UP
EPI CELLS # UR: SIGNIFICANT CHANGE UP
GLUCOSE BLDC GLUCOMTR-MCNC: 186 MG/DL — HIGH (ref 70–99)
GLUCOSE BLDC GLUCOMTR-MCNC: 201 MG/DL — HIGH (ref 70–99)
GLUCOSE BLDC GLUCOMTR-MCNC: 220 MG/DL — HIGH (ref 70–99)
GLUCOSE BLDC GLUCOMTR-MCNC: 279 MG/DL — HIGH (ref 70–99)
GLUCOSE UR QL: 250 MG/DL
KETONES UR-MCNC: NEGATIVE — SIGNIFICANT CHANGE UP
LEUKOCYTE ESTERASE UR-ACNC: ABNORMAL
NITRITE UR-MCNC: NEGATIVE — SIGNIFICANT CHANGE UP
PH UR: 6.5 — SIGNIFICANT CHANGE UP (ref 5–8)
PROT UR-MCNC: 30 MG/DL
RBC CASTS # UR COMP ASSIST: SIGNIFICANT CHANGE UP /HPF (ref 0–4)
SP GR SPEC: 1.01 — SIGNIFICANT CHANGE UP (ref 1.01–1.02)
UROBILINOGEN FLD QL: NEGATIVE MG/DL — SIGNIFICANT CHANGE UP
WBC UR QL: SIGNIFICANT CHANGE UP

## 2020-04-20 PROCEDURE — 99223 1ST HOSP IP/OBS HIGH 75: CPT

## 2020-04-20 PROCEDURE — 99232 SBSQ HOSP IP/OBS MODERATE 35: CPT

## 2020-04-20 RX ORDER — HYDRALAZINE HCL 50 MG
5 TABLET ORAL ONCE
Refills: 0 | Status: COMPLETED | OUTPATIENT
Start: 2020-04-20 | End: 2020-04-20

## 2020-04-20 RX ADMIN — Medication 1 SPRAY(S): at 16:48

## 2020-04-20 RX ADMIN — Medication 100 MILLIGRAM(S): at 05:42

## 2020-04-20 RX ADMIN — Medication 100 MILLIGRAM(S): at 21:03

## 2020-04-20 RX ADMIN — ENOXAPARIN SODIUM 70 MILLIGRAM(S): 100 INJECTION SUBCUTANEOUS at 05:42

## 2020-04-20 RX ADMIN — Medication 81 MILLIGRAM(S): at 09:03

## 2020-04-20 RX ADMIN — Medication 3: at 12:41

## 2020-04-20 RX ADMIN — Medication 5 MILLIGRAM(S): at 23:09

## 2020-04-20 RX ADMIN — Medication 1 SPRAY(S): at 12:20

## 2020-04-20 RX ADMIN — Medication 1 SPRAY(S): at 22:44

## 2020-04-20 RX ADMIN — Medication 2: at 08:57

## 2020-04-20 RX ADMIN — ENOXAPARIN SODIUM 70 MILLIGRAM(S): 100 INJECTION SUBCUTANEOUS at 16:48

## 2020-04-20 RX ADMIN — Medication 6 UNIT(S): at 08:57

## 2020-04-20 RX ADMIN — Medication 6 UNIT(S): at 12:42

## 2020-04-20 RX ADMIN — INSULIN GLARGINE 25 UNIT(S): 100 INJECTION, SOLUTION SUBCUTANEOUS at 21:03

## 2020-04-20 RX ADMIN — Medication 25 MILLIGRAM(S): at 05:49

## 2020-04-20 RX ADMIN — Medication 25 MILLIGRAM(S): at 16:48

## 2020-04-20 RX ADMIN — FAMOTIDINE 20 MILLIGRAM(S): 10 INJECTION INTRAVENOUS at 09:03

## 2020-04-20 RX ADMIN — AMLODIPINE BESYLATE 5 MILLIGRAM(S): 2.5 TABLET ORAL at 05:49

## 2020-04-20 RX ADMIN — Medication 1: at 16:41

## 2020-04-20 RX ADMIN — Medication 100 MILLIGRAM(S): at 12:49

## 2020-04-20 RX ADMIN — SIMVASTATIN 20 MILLIGRAM(S): 20 TABLET, FILM COATED ORAL at 21:03

## 2020-04-20 RX ADMIN — Medication 50 MILLIGRAM(S): at 05:48

## 2020-04-20 RX ADMIN — Medication 1 SPRAY(S): at 05:43

## 2020-04-20 RX ADMIN — Medication 6 UNIT(S): at 16:41

## 2020-04-20 RX ADMIN — Medication 650 MILLIGRAM(S): at 23:09

## 2020-04-20 NOTE — PROGRESS NOTE ADULT - SUBJECTIVE AND OBJECTIVE BOX
[facility]                                        Neurology at [place]                                 Chayito Jean, & Jose                                  [address]                                        [place], [place], [address]                                             [phone]        CC: Stroke    HPI:   The patient is a 69y Female who presented with acute aphasia and right sided weakness today.  Likely last know  well was at 0730 when he nurse made initial assessment and said she was speaking, but full evaluation not performed.  At about 1055 she was noted to have her symptoms of aphasia and right sided weakness.  She had a head CT done which showed left frontal/parietal CVA.  There was a delay in getting her CTA/P due to difficulty finding a vein for a line of adequate size for CT-A.  Once done and reviewed, there was ICU stenosis, likely from atherosclerosis, narrow but not occluded.  CTP was further delayed because of difficulty sending images to [facility] to be processed.  Once these images were obtained it showed a small almost entirely completed stroke.  It was decided not to intervene due to the fact that there was no significant penumbra seen on CTP.  Alteplase was not given due to the timing not being clear, that there was a completed stroke on head CT.     Interim history:  Remains on 4 [place].   Improved speech although still with difficulty.    ROS:   Aphasic. but denies headache.     MEDICATIONS  (STANDING):  amLODIPine   Tablet 5 milliGRAM(s) Oral daily  aspirin enteric coated 81 milliGRAM(s) Oral daily  benzonatate 100 milliGRAM(s) Oral every 8 hours  dextrose 5%. 1000 milliLiter(s) (50 mL/Hr) IV Continuous <Continuous>  enoxaparin Injectable 70 milliGRAM(s) SubCutaneous every 12 hours  famotidine    Tablet 20 milliGRAM(s) Oral daily  hydrALAZINE 50 milliGRAM(s) Oral daily  insulin glargine Injectable (LANTUS) 25 Unit(s) SubCutaneous at bedtime  insulin lispro (HumaLOG) corrective regimen sliding scale   SubCutaneous three times a day before meals  insulin lispro (HumaLOG) corrective regimen sliding scale   SubCutaneous at bedtime  insulin lispro Injectable (HumaLOG) 6 Unit(s) SubCutaneous three times a day before meals  metoprolol tartrate 25 milliGRAM(s) Oral every 12 hours  simvastatin 20 milliGRAM(s) Oral at bedtime  sodium chloride 0.65% Nasal 1 Spray(s) Both Nostrils four times a day  sodium chloride 0.9% Bolus 250 milliLiter(s) IV Bolus once  sodium chloride 0.9%. 500 milliLiter(s) (100 mL/Hr) IV Continuous <Continuous>      Vital Signs Last 24 Hrs  T(C): 36.9 (20 Apr 2020 09:04), Max: 36.9 (19 Apr 2020 16:08)  T(F): 98.5 (20 Apr 2020 09:04), Max: 98.5 (20 Apr 2020 09:04)  HR: 92 (20 Apr 2020 09:04) (72 - 100)  BP: 146/70 (20 Apr 2020 09:04) (146/70 - 167/84)  RR: 18 (20 Apr 2020 09:04) (18 - 18)  SpO2: 99% (20 Apr 2020 09:04) (87% - 99%)    Detailed Neurologic Exam:    Mental status: The patient is awake and alert. There is both expressive and receptive difficulty although she is more verbal today.   She is able to give her name and attempts to answer questions. She follows simple instructions (Urdu only) with the left side.     Cranial nerves: Pupils equal and react symmetrically to light. She blinks to threat. She tracks with gaze. Facial sensation cannot be assessed Facial musculature is asymmetric with a depression of the right nasolabial fold. Palate elevates symmetrically. Tongue is midline.    Motor/Sensory: There is normal bulk and tone.  There is no tremor.  Moving left to stimuli.   Minimal movement on right.    Reflexes: 1+ throughout and plantar responses are flexor.    Cerebellar: Cannot be assessed.     Labs:     04-19    139  |  99  |  15.0  ----------------------------<  204<H>  3.7   |  27.0  |  0.43<L>    Ca    9.4      19 Apr 2020 08:32    TPro  6.9  /  Alb  2.9<L>  /  TBili  0.5  /  DBili  x   /  AST  24  /  ALT  17  /  AlkPhos  96  04-19                            12.7   13.54 )-----------( 471      ( 19 Apr 2020 08:32 )             38.1

## 2020-04-20 NOTE — CONSULT NOTE ADULT - REASON FOR ADMISSION
Acute hypoxemic respiratory failure due to suspected COVID PNA

## 2020-04-20 NOTE — PROGRESS NOTE ADULT - SUBJECTIVE AND OBJECTIVE BOX
Pt is resting comfortably.  Speech has improved slightly.  Will need [facility] due to right hemiparesis.    oe:  cvs:  s1, s2         rs:  cta bilaterally         gi:  soft, non tender         ext:  no cyanosis, clubbing, edema         neuro - right hemiparesis, aphasia    a/p:  cva:  will need L[facility]          COVID - completed treatment          htn - controlled          hyperlipidemia - continue present mgmt          dc to [facility] when available

## 2020-04-20 NOTE — CONSULT NOTE ADULT - SUBJECTIVE AND OBJECTIVE BOX
69yF was admitted on 04-08 due to COVID related infection superimposed aphasia and right sided weakness.   A head CT showed an acute left frontal/parietal infarct.       Imaging showed (reviewed):  CXR - Lines: None. There is a loop recorder in the left chest wall.  Heart/Mediastinum/Lungs/Other: The heart size is normal.The lungs demonstrate diffuse patchy bilateral airspace disease. There are no pleural effusions.    Patient is weak, notes right sided weakness.   Having expressive deficits.     REVIEW OF SYSTEMS  Constitutional - No fever, No weight loss, +fatigue  HEENT - No eye pain, No visual disturbances, No difficulty hearing, No tinnitus, No vertigo, No neck pain  Respiratory - No cough, No wheezing, No shortness of breath  Cardiovascular - No chest pain, No palpitations  Gastrointestinal - No abdominal pain, No nausea, No vomiting, No diarrhea, No constipation  Genitourinary - No dysuria, No frequency, No hematuria, No incontinence  Neurological - No headaches, No memory loss, +loss of strength, No numbness, No tremors  Skin - No itching, No rashes, No lesions   Endocrine - No temperature intolerance  Musculoskeletal - No joint pain, No joint swelling, No muscle pain  Psychiatric - +depression, No anxiety    VITALS  T(C): 36.9 (04-20-20 @ 09:04), Max: 36.9 (04-19-20 @ 16:08)  HR: 92 (04-20-20 @ 09:04) (72 - 100)  BP: 146/70 (04-20-20 @ 09:04) (146/70 - 167/84)  RR: 18 (04-20-20 @ 09:04) (18 - 18)  SpO2: 99% (04-20-20 @ 09:04) (87% - 99%)  Wt(kg): --    PAST MEDICAL & SURGICAL HISTORY  H/O gastroesophageal reflux (GERD)  Acute otitis media, unspecified otitis media type  HTN (hypertension)  DM (diabetes mellitus)  History of benign brain tumor      SOCIAL HISTORY  Smoking - Denied  EtOH - Denied   Drugs - Denied    FUNCTIONAL HISTORY  Lives with family, [address] apartment  Independent    CURRENT FUNCTIONAL STATUS  4/17  Bed Mobility: Sit to Supine:     · Level of [place]	moderate assist (50% patients effort)	  · Physical Assist/Nonphysical Assist	2 person assist	    Bed Mobility: Supine to Sit:     · Level of [place]	moderate assist (50% patients effort)	  · Physical Assist/Nonphysical Assist	1 person + 1 person to manage equipment; 2nd assist on standby	    Bed Mobility Analysis:     · Bed Mobility Limitations	decreased ability to use legs for bridging/pushing; decreased ability to use arms for pushing/pulling; impaired ability to control trunk for mobility	  · Impairments Contributing to Impaired Bed Mobility	impaired balance; impaired coordination; impaired motor control; impaired postural control; decreased strength	    Transfer: Sit to Stand:     · Level of [place]	unable to perform	  · Physical Assist/Nonphysical Assist	2 person assist	  · Weight-Bearing Restrictions	weight-bearing as tolerated	    Sit/Stand Transfer Safety Analysis:     · Transfer Safety Concerns Noted	unable to clear pt's buttocks from EOB despite max efforts of PT and OT.	    Stair Negotiation:     · Level of [place]	unable to perform	      Bathing Training:     · Level of [place]	maximum assist (25% patients effort)	  · Physical Assist/Nonphysical Assist	1 person assist	    Upper Body Dressing Training:     · Level of [place]	maximum assist (25% patients effort)	  · Physical Assist/Nonphysical Assist	1 person assist	    Lower Body Dressing Training:     · Level of [place]	dependent (less than 25% patients effort)	    Toilet Hygiene Training:     · Level of [place]	dependent (less than 25% patients effort); +prima fit	    Grooming Training:     · Level of [place]	maximum assist (25% patients effort)	  · Physical Assist/Nonphysical Assist	1 person assist	    Eating/Self-Feeding Training:     · Level of [place]	to be assessed	    FAMILY HISTORY   FH: asthma  No pertinent family history in first degree relatives  Family history of diabetes mellitus      RECENT LABS/IMAGING  CBC Full  -  ( 19 Apr 2020 08:32 )  WBC Count : 13.54 K/uL  RBC Count : 4.42 M/uL  Hemoglobin : 12.7 g/dL  Hematocrit : 38.1 %  Platelet Count - Automated : 471 K/uL  Mean Cell Volume : 86.2 fl  Mean Cell Hemoglobin : 28.7 pg  Mean Cell Hemoglobin Concentration : 33.3 gm/dL  Auto Neutrophil # : x  Auto Lymphocyte # : x  Auto Monocyte # : x  Auto Eosinophil # : x  Auto Basophil # : x  Auto Neutrophil % : x  Auto Lymphocyte % : x  Auto Monocyte % : x  Auto Eosinophil % : x  Auto Basophil % : x    04-19    139  |  99  |  15.0  ----------------------------<  204<H>  3.7   |  27.0  |  0.43<L>    Ca    9.4      19 Apr 2020 08:32    TPro  6.9  /  Alb  2.9<L>  /  TBili  0.5  /  DBili  x   /  AST  24  /  ALT  17  /  AlkPhos  96  04-19        ALLERGIES  No Known Allergies      MEDICATIONS   acetaminophen   Tablet .. 650 milliGRAM(s) Oral every 6 hours PRN  amLODIPine   Tablet 5 milliGRAM(s) Oral daily  aspirin enteric coated 81 milliGRAM(s) Oral daily  benzonatate 100 milliGRAM(s) Oral every 8 hours  dextrose 40% Gel 15 Gram(s) Oral once PRN  dextrose 5%. 1000 milliLiter(s) IV Continuous <Continuous>  dextrose 50% Injectable 12.5 Gram(s) IV Push once  dextrose 50% Injectable 25 Gram(s) IV Push once  dextrose 50% Injectable 25 Gram(s) IV Push once  enoxaparin Injectable 70 milliGRAM(s) SubCutaneous every 12 hours  famotidine    Tablet 20 milliGRAM(s) Oral daily  glucagon  Injectable 1 milliGRAM(s) IntraMuscular once PRN  hydrALAZINE 50 milliGRAM(s) Oral daily  insulin glargine Injectable (LANTUS) 25 Unit(s) SubCutaneous at bedtime  insulin lispro (HumaLOG) corrective regimen sliding scale   SubCutaneous three times a day before meals  insulin lispro (HumaLOG) corrective regimen sliding scale   SubCutaneous at bedtime  insulin lispro Injectable (HumaLOG) 6 Unit(s) SubCutaneous three times a day before meals  metoprolol tartrate 25 milliGRAM(s) Oral every 12 hours  simvastatin 20 milliGRAM(s) Oral at bedtime  sodium chloride 0.65% Nasal 1 Spray(s) Both Nostrils four times a day  sodium chloride 0.9% Bolus 250 milliLiter(s) IV Bolus once  sodium chloride 0.9%. 500 milliLiter(s) IV Continuous <Continuous>      ----------------------------------------------------------------------------------------  PHYSICAL EXAM  Constitutional - NAD, Comfortable  HEENT - NCAT, EOMI  Neck - Supple, No limited ROM  Chest - Breathing comfortably, No wheezing  Cardiovascular - S1S2   Abdomen - Soft   Extremities - Right UE edema  Neurologic Exam -                    Cognitive - Awake, Alert, AAO to self      Communication - Expressive deficits, Slowed processing, Dysarthria     Cranial Nerves - Right facial droop     Motor - Right hemiparesis                    LEFT    UE - ShAB 5/5, EF 5/5, EE 5/5, WE 5/5,  5/5                    RIGHT UE - ShAB 1/5, EF 0/5, EE 0/5,  1/5                    LEFT    LE - HF 5/5, KE 5/5, DF 5/5, PF 5/5                    RIGHT LE - HF 0/5, KE 0/5, DF 0/5, PF 0/5        Sensory - Intact to LT  Psychiatric - Mood stable, Affect Flat  ----------------------------------------------------------------------------------------  ASSESSMENT/PLAN  69yFemale with functional deficits after COVID related complications  COVID - Stable  CVA - ASA, Lovenox, Lipitor  DM2 - Lantus, Humalog  HTN - Lopressor, Hydralazine  Pain - Tylenol  DVT PPX - SCDs  Rehab - Will continue to follow for ongoing rehab needs and recommendations. At this time, recommend DC to [facility].       Will continue to follow. Functional progress will determine ongoing rehab dispo recommendations, which may change.    Continue bedside therapy as well as OOB throughout the day with mobilization by staff to maintain cardiopulmonary function and prevention of secondary complications related to debility. 69yF was admitted on 04-08 due to COVID related infection superimposed aphasia and right sided weakness.   A head CT showed an acute left frontal/parietal infarct.       Imaging showed (reviewed):  CXR - Lines: None. There is a loop recorder in the left chest wall.  Heart/Mediastinum/Lungs/Other: The heart size is normal.The lungs demonstrate diffuse patchy bilateral airspace disease. There are no pleural effusions.    Patient is weak, notes right sided weakness.   Having expressive deficits.     REVIEW OF SYSTEMS  Constitutional - No fever, No weight loss, +fatigue  HEENT - No eye pain, No visual disturbances, No difficulty hearing, No tinnitus, No vertigo, No neck pain  Respiratory - No cough, No wheezing, No shortness of breath  Cardiovascular - No chest pain, No palpitations  Gastrointestinal - No abdominal pain, No nausea, No vomiting, No diarrhea, No constipation  Genitourinary - No dysuria, No frequency, No hematuria, No incontinence  Neurological - No headaches, No memory loss, +loss of strength, No numbness, No tremors  Skin - No itching, No rashes, No lesions   Endocrine - No temperature intolerance  Musculoskeletal - No joint pain, No joint swelling, No muscle pain  Psychiatric - +depression, No anxiety    VITALS  T(C): 36.9 (04-20-20 @ 09:04), Max: 36.9 (04-19-20 @ 16:08)  HR: 92 (04-20-20 @ 09:04) (72 - 100)  BP: 146/70 (04-20-20 @ 09:04) (146/70 - 167/84)  RR: 18 (04-20-20 @ 09:04) (18 - 18)  SpO2: 99% (04-20-20 @ 09:04) (87% - 99%)  Wt(kg): --    PAST MEDICAL & SURGICAL HISTORY  H/O gastroesophageal reflux (GERD)  Acute otitis media, unspecified otitis media type  HTN (hypertension)  DM (diabetes mellitus)  History of benign brain tumor      SOCIAL HISTORY  Smoking - Denied  EtOH - Denied   Drugs - Denied    FUNCTIONAL HISTORY  Lives with family, [address] apartment  Independent    CURRENT FUNCTIONAL STATUS  4/17  Bed Mobility: Sit to Supine:     · Level of [place]	moderate assist (50% patients effort)	  · Physical Assist/Nonphysical Assist	2 person assist	    Bed Mobility: Supine to Sit:     · Level of [place]	moderate assist (50% patients effort)	  · Physical Assist/Nonphysical Assist	1 person + 1 person to manage equipment; 2nd assist on standby	    Bed Mobility Analysis:     · Bed Mobility Limitations	decreased ability to use legs for bridging/pushing; decreased ability to use arms for pushing/pulling; impaired ability to control trunk for mobility	  · Impairments Contributing to Impaired Bed Mobility	impaired balance; impaired coordination; impaired motor control; impaired postural control; decreased strength	    Transfer: Sit to Stand:     · Level of [place]	unable to perform	  · Physical Assist/Nonphysical Assist	2 person assist	  · Weight-Bearing Restrictions	weight-bearing as tolerated	    Sit/Stand Transfer Safety Analysis:     · Transfer Safety Concerns Noted	unable to clear pt's buttocks from EOB despite max efforts of PT and OT.	    Stair Negotiation:     · Level of [place]	unable to perform	      Bathing Training:     · Level of [place]	maximum assist (25% patients effort)	  · Physical Assist/Nonphysical Assist	1 person assist	    Upper Body Dressing Training:     · Level of [place]	maximum assist (25% patients effort)	  · Physical Assist/Nonphysical Assist	1 person assist	    Lower Body Dressing Training:     · Level of [place]	dependent (less than 25% patients effort)	    Toilet Hygiene Training:     · Level of [place]	dependent (less than 25% patients effort); +prima fit	    Grooming Training:     · Level of [place]	maximum assist (25% patients effort)	  · Physical Assist/Nonphysical Assist	1 person assist	    Eating/Self-Feeding Training:     · Level of [place]	to be assessed	    FAMILY HISTORY   FH: asthma  No pertinent family history in first degree relatives  Family history of diabetes mellitus      RECENT LABS/IMAGING  CBC Full  -  ( 19 Apr 2020 08:32 )  WBC Count : 13.54 K/uL  RBC Count : 4.42 M/uL  Hemoglobin : 12.7 g/dL  Hematocrit : 38.1 %  Platelet Count - Automated : 471 K/uL  Mean Cell Volume : 86.2 fl  Mean Cell Hemoglobin : 28.7 pg  Mean Cell Hemoglobin Concentration : 33.3 gm/dL  Auto Neutrophil # : x  Auto Lymphocyte # : x  Auto Monocyte # : x  Auto Eosinophil # : x  Auto Basophil # : x  Auto Neutrophil % : x  Auto Lymphocyte % : x  Auto Monocyte % : x  Auto Eosinophil % : x  Auto Basophil % : x    04-19    139  |  99  |  15.0  ----------------------------<  204<H>  3.7   |  27.0  |  0.43<L>    Ca    9.4      19 Apr 2020 08:32    TPro  6.9  /  Alb  2.9<L>  /  TBili  0.5  /  DBili  x   /  AST  24  /  ALT  17  /  AlkPhos  96  04-19        ALLERGIES  No Known Allergies      MEDICATIONS   acetaminophen   Tablet .. 650 milliGRAM(s) Oral every 6 hours PRN  amLODIPine   Tablet 5 milliGRAM(s) Oral daily  aspirin enteric coated 81 milliGRAM(s) Oral daily  benzonatate 100 milliGRAM(s) Oral every 8 hours  dextrose 40% Gel 15 Gram(s) Oral once PRN  dextrose 5%. 1000 milliLiter(s) IV Continuous <Continuous>  dextrose 50% Injectable 12.5 Gram(s) IV Push once  dextrose 50% Injectable 25 Gram(s) IV Push once  dextrose 50% Injectable 25 Gram(s) IV Push once  enoxaparin Injectable 70 milliGRAM(s) SubCutaneous every 12 hours  famotidine    Tablet 20 milliGRAM(s) Oral daily  glucagon  Injectable 1 milliGRAM(s) IntraMuscular once PRN  hydrALAZINE 50 milliGRAM(s) Oral daily  insulin glargine Injectable (LANTUS) 25 Unit(s) SubCutaneous at bedtime  insulin lispro (HumaLOG) corrective regimen sliding scale   SubCutaneous three times a day before meals  insulin lispro (HumaLOG) corrective regimen sliding scale   SubCutaneous at bedtime  insulin lispro Injectable (HumaLOG) 6 Unit(s) SubCutaneous three times a day before meals  metoprolol tartrate 25 milliGRAM(s) Oral every 12 hours  simvastatin 20 milliGRAM(s) Oral at bedtime  sodium chloride 0.65% Nasal 1 Spray(s) Both Nostrils four times a day  sodium chloride 0.9% Bolus 250 milliLiter(s) IV Bolus once  sodium chloride 0.9%. 500 milliLiter(s) IV Continuous <Continuous>      ----------------------------------------------------------------------------------------  PHYSICAL EXAM  Constitutional - NAD, Comfortable  HEENT - NCAT, EOMI  Neck - Supple, No limited ROM  Chest - Breathing comfortably, No wheezing  Cardiovascular - S1S2   Abdomen - Soft   Extremities - Right UE edema  Neurologic Exam -                    Cognitive - Awake, Alert, AAO to self      Communication - Expressive deficits, Slowed processing, Dysarthria     Cranial Nerves - Right facial droop     Motor - Right hemiparesis                    LEFT    UE - ShAB 5/5, EF 5/5, EE 5/5, WE 5/5,  5/5                    RIGHT UE - ShAB 1/5, EF 0/5, EE 0/5,  1/5                    LEFT    LE - HF 5/5, KE 5/5, DF 5/5, PF 5/5                    RIGHT LE - HF 0/5, KE 0/5, DF 0/5, PF 0/5        Sensory - Intact to LT  Psychiatric - Mood stable, Affect Flat  ----------------------------------------------------------------------------------------  ASSESSMENT/PLAN  69yFemale with functional deficits after COVID related complications  COVID - Stable  CVA - ASA, Lovenox, Lipitor  DM2 - Lantus, Humalog  HTN - Lopressor, Hydralazine  Pain - Tylenol  DVT PPX - SCDs  Rehab - Recommend ACUTE inpatient rehabilitation for the functional deficits consisting of 3 hours of therapy/day & 24 hour RN/daily PMR physician for comorbid medical management. Will continue to follow for ongoing rehab needs and recommendations. Patient will be able to tolerate 3 hours a day.    Continue bedside therapy as well as OOB throughout the day with mobilization throughout the day with staff to maintain cardiopulmonary function and prevention of secondary complications related to debility.      At the time of this notation, as per administration at [facility], admission to Acute Inpatient Rehabilitation Facility of a COVID19+ patient must meet the following criteria:    1. Be at least 7 days post-first COVID + test  2. Be without FEVER or USE OF ANTIPYRETICS for  >72 hours  3. Be able to perform transfers or bed mobility with at most 50% assist (i.e. moderate assistance)  4. Be medically stable with resolution of primary symptoms and can tolerate 3 hours of therapy   5. Have a discharge plan from rehab is to home upon completion of AR    The above requirements may change and will be updated accordingly.

## 2020-04-20 NOTE — PROGRESS NOTE ADULT - ASSESSMENT
69y Female who is followed by neurology because of stroke    Acute stroke in COVID patient  May be hypercoagulable due to COVID-19.  Hypercoagulable studies pending.   Continue Lovenox 1mg/kg bid- this will also provide DVT prophylaxis.  LDL: 40  At goal (<70).  Antiplatelet/statin.  Mobilize with physical therapy.   Seen by PT recommending acute rehab.     Covid-19  Per medicine.     Discharge planning.   Eventual rehab when stable.   Would continue Lovenox at rehab.   Case discussed with Dr Zhou.   Patient will go to subacute rehabilitation as acute not accepting.     No further specific neurologic recommendations. Please recall if I can be of further assistance.

## 2020-04-21 LAB
ALBUMIN SERPL ELPH-MCNC: 2.9 G/DL — LOW (ref 3.3–5.2)
ALP SERPL-CCNC: 82 U/L — SIGNIFICANT CHANGE UP (ref 40–120)
ALT FLD-CCNC: 17 U/L — SIGNIFICANT CHANGE UP
ANION GAP SERPL CALC-SCNC: 13 MMOL/L — SIGNIFICANT CHANGE UP (ref 5–17)
AST SERPL-CCNC: 21 U/L — SIGNIFICANT CHANGE UP
AT III ACT/NOR PPP CHRO: 84 % — LOW (ref 85–135)
AT III AG PPP IA-MCNC: 25 MG/DL — SIGNIFICANT CHANGE UP (ref 19–31)
B2 GLYCOPROT1 AB SER QL: NEGATIVE — SIGNIFICANT CHANGE UP
BILIRUB SERPL-MCNC: 0.4 MG/DL — SIGNIFICANT CHANGE UP (ref 0.4–2)
BUN SERPL-MCNC: 21 MG/DL — HIGH (ref 8–20)
CALCIUM SERPL-MCNC: 9.4 MG/DL — SIGNIFICANT CHANGE UP (ref 8.6–10.2)
CARDIOLIPIN AB SER-ACNC: NEGATIVE — SIGNIFICANT CHANGE UP
CHLORIDE SERPL-SCNC: 98 MMOL/L — SIGNIFICANT CHANGE UP (ref 98–107)
CO2 SERPL-SCNC: 28 MMOL/L — SIGNIFICANT CHANGE UP (ref 22–29)
CREAT SERPL-MCNC: 0.67 MG/DL — SIGNIFICANT CHANGE UP (ref 0.5–1.3)
DRVVT SCREEN TO CONFIRM RATIO: SIGNIFICANT CHANGE UP
GLUCOSE BLDC GLUCOMTR-MCNC: 180 MG/DL — HIGH (ref 70–99)
GLUCOSE BLDC GLUCOMTR-MCNC: 204 MG/DL — HIGH (ref 70–99)
GLUCOSE BLDC GLUCOMTR-MCNC: 240 MG/DL — HIGH (ref 70–99)
GLUCOSE BLDC GLUCOMTR-MCNC: 249 MG/DL — HIGH (ref 70–99)
GLUCOSE SERPL-MCNC: 187 MG/DL — HIGH (ref 70–99)
HCT VFR BLD CALC: 35.8 % — SIGNIFICANT CHANGE UP (ref 34.5–45)
HGB BLD-MCNC: 11.8 G/DL — SIGNIFICANT CHANGE UP (ref 11.5–15.5)
LA NT DPL PPP QL: 32.3 SEC — SIGNIFICANT CHANGE UP
MCHC RBC-ENTMCNC: 29 PG — SIGNIFICANT CHANGE UP (ref 27–34)
MCHC RBC-ENTMCNC: 33 GM/DL — SIGNIFICANT CHANGE UP (ref 32–36)
MCV RBC AUTO: 88 FL — SIGNIFICANT CHANGE UP (ref 80–100)
PLATELET # BLD AUTO: 437 K/UL — HIGH (ref 150–400)
POTASSIUM SERPL-MCNC: 4.1 MMOL/L — SIGNIFICANT CHANGE UP (ref 3.5–5.3)
POTASSIUM SERPL-SCNC: 4.1 MMOL/L — SIGNIFICANT CHANGE UP (ref 3.5–5.3)
PROT C ACT/NOR PPP: 84 % — SIGNIFICANT CHANGE UP (ref 74–150)
PROT SERPL-MCNC: 6.6 G/DL — SIGNIFICANT CHANGE UP (ref 6.6–8.7)
RBC # BLD: 4.07 M/UL — SIGNIFICANT CHANGE UP (ref 3.8–5.2)
RBC # FLD: 14.4 % — SIGNIFICANT CHANGE UP (ref 10.3–14.5)
SODIUM SERPL-SCNC: 139 MMOL/L — SIGNIFICANT CHANGE UP (ref 135–145)
WBC # BLD: 14.25 K/UL — HIGH (ref 3.8–10.5)
WBC # FLD AUTO: 14.25 K/UL — HIGH (ref 3.8–10.5)

## 2020-04-21 PROCEDURE — 99233 SBSQ HOSP IP/OBS HIGH 50: CPT

## 2020-04-21 RX ADMIN — AMLODIPINE BESYLATE 5 MILLIGRAM(S): 2.5 TABLET ORAL at 04:54

## 2020-04-21 RX ADMIN — ENOXAPARIN SODIUM 70 MILLIGRAM(S): 100 INJECTION SUBCUTANEOUS at 09:52

## 2020-04-21 RX ADMIN — Medication 1 SPRAY(S): at 09:53

## 2020-04-21 RX ADMIN — Medication 6 UNIT(S): at 11:49

## 2020-04-21 RX ADMIN — ENOXAPARIN SODIUM 70 MILLIGRAM(S): 100 INJECTION SUBCUTANEOUS at 20:58

## 2020-04-21 RX ADMIN — Medication 25 MILLIGRAM(S): at 17:10

## 2020-04-21 RX ADMIN — Medication 100 MILLIGRAM(S): at 20:58

## 2020-04-21 RX ADMIN — Medication 100 MILLIGRAM(S): at 09:52

## 2020-04-21 RX ADMIN — Medication 81 MILLIGRAM(S): at 09:52

## 2020-04-21 RX ADMIN — Medication 1: at 11:49

## 2020-04-21 RX ADMIN — SIMVASTATIN 20 MILLIGRAM(S): 20 TABLET, FILM COATED ORAL at 20:58

## 2020-04-21 RX ADMIN — Medication 25 MILLIGRAM(S): at 04:54

## 2020-04-21 RX ADMIN — Medication 6 UNIT(S): at 17:06

## 2020-04-21 RX ADMIN — FAMOTIDINE 20 MILLIGRAM(S): 10 INJECTION INTRAVENOUS at 09:53

## 2020-04-21 RX ADMIN — Medication 2: at 17:05

## 2020-04-21 RX ADMIN — Medication 1 SPRAY(S): at 17:10

## 2020-04-21 RX ADMIN — Medication 2: at 08:16

## 2020-04-21 RX ADMIN — Medication 100 MILLIGRAM(S): at 04:54

## 2020-04-21 RX ADMIN — Medication 1 SPRAY(S): at 04:54

## 2020-04-21 RX ADMIN — INSULIN GLARGINE 25 UNIT(S): 100 INJECTION, SOLUTION SUBCUTANEOUS at 20:58

## 2020-04-21 RX ADMIN — Medication 50 MILLIGRAM(S): at 04:54

## 2020-04-21 RX ADMIN — Medication 6 UNIT(S): at 08:17

## 2020-04-21 NOTE — PROGRESS NOTE ADULT - SUBJECTIVE AND OBJECTIVE BOX
Pt is resting comfortably.  PM&R reconsult appreciated.  Approved for acute rehab.    oe:  cvs:  s1, s2         rs:  cta bilaterally         gi:  soft, non tender         ext:  no cyanosis, clubbing, edema         neuro - right hemiparesis, improving aphasia    a/p:  cva - continue physical therapy          htn - controlled          hyperlipidemia - cont present mgmt          dc planning - acute rehab

## 2020-04-21 NOTE — PROGRESS NOTE ADULT - SUBJECTIVE AND OBJECTIVE BOX
Patient doing well.  Assisted with set up for feeding and using her left arm.  Reports no pain.     REVIEW OF SYSTEMS  Constitutional - No fever,  No fatigue  HEENT - No vertigo, No neck pain  Neurological - No headaches, No memory loss, +loss of strength, No numbness, No tremors  Skin - No rashes, No lesions   Musculoskeletal - No joint pain, No joint swelling, No muscle pain  Psychiatric - No depression, No anxiety    FUNCTIONAL PROGRESS    Bed Mobility  Bed Mobility Training Rolling/Turning: moderate assist (50% patient effort);  1 person assist;  verbal cues;  nonverbal cues (demo/gestures);  Physical assist secondary to right sided hemiparesis. Verbal/nonverbal cues required secondary to decreased attention, processing speed, problem solving and sequencing.;  bed rails;  head of bed elevated  Bed Mobility Training Scooting: moderate assist (50% patient effort);  1 person assist;  verbal cues;  nonverbal cues (demo/gestures);  to scoot to edge of bed secondary to right sided hemiparesis. Verbal/nonverbal cues required secondary to decreased attention, processing speed, problem solving and sequencing.;  bed rails  Bed Mobility Training Sit-to-Supine: moderate assist (50% patient effort);  verbal cues;  nonverbal cues (demo/gestures);  Physical assist to lift right LE onto bed, patient able to assist with left LE, taught strategies to use left LE to assist Right LE. Patient able to lower trunk onto bed. Verbal/nonverbal cues required secondary to decerased coginition. processing, sequencing, attention and problem solving ability.;  head of bed elevated  Bed Mobility Training Supine-to-Sit: moderate assist (50% patient effort);  1 person assist;  verbal cues;  nonverbal cues (demo/gestures);  Physical assist to lift right LE off of bed, patient able to assist with left LE, taught strategies to use left LE to assist Right LE secondary to right hemiparesis. Physical assist to achieve upright trunk posture seated at edge of bed secondary to right hemiparesis. Verbal/nonverbal cues required secondary to decerased coginition. processing, sequencing, attention and problem solving ability.;  head of bed elevated;  bed rails  Bed Mobility Training Limitations: decreased ability to use arms for pushing/pulling;  decreased ability to use legs for bridging/pushing;  abnormal muscle tone;  impaired balance;  cognitive, decreased safety awareness;  impaired coordination;  deconditioned;  decreased strength;  decreased ROM    Therapeutic Exercise  Therapeutic Exercise Detail: Left UE therex for AROM for shoulder flex/ext, elbow flex/ext, pronation/supination, wrist flex/ext, gross grasp and digit extension 5 reps 2 sets. Right UE therex for AAROM for shoulder flex/ext, elbow flex/ext, pronation/supination, wrist flex/ext 5 reps, 2 sets. Right UE AROM for gross grasp and digit extension 5 reps 2 sets. ROM therex performed to maintain muscle integrity, prevent atrophy and to facilitate participation in ADLs.     Neuromuscular Re-education  Neuromuscular Re-education Detail: Patient performed static sitting balance at edge of bed with min A to maintain upright trunk position. Patient used Left UE to support self using bed rails but fatigued quickly. Patient perfromed dynamic sitting balance at edge of bed with Mod A to facilitate participation with ADLs. Patient educated on energy conservation techniques and encouraged to take frequent rest breaks.     Lower Body Dressing Training  Lower Body Dressing Training Assistance: maximum assist (25% patient effort);  nonverbal cues (demo/gestures);  verbal cues;  1 person assist;  Physical assist to don/doff socks seated at edge of bed. Patient able to self support using bed rails and left UE. Verbal/non verbal cues and repetition requrired  secondary to decreased cognition, processing, attention, sequencing and problem solving abilities.;  abnormal muscle tone;  decreased flexibility;  decreased strength;  impaired balance;  cognitive, decreased safety awareness          VITALS  T(C): 36.6 (20 @ 10:18), Max: 37.2 (20 @ 22:42)  HR: 98 (20 @ 10:18) (77 - 109)  BP: 145/76 (20 @ 10:18) (129/66 - 165/85)  RR: 18 (20 @ 10:18) (18 - 18)  SpO2: 98% (20 @ 10:18) (98% - 98%)  Wt(kg): --    MEDICATIONS   acetaminophen   Tablet .. 650 milliGRAM(s) every 6 hours PRN  amLODIPine   Tablet 5 milliGRAM(s) daily  aspirin enteric coated 81 milliGRAM(s) daily  benzonatate 100 milliGRAM(s) every 8 hours  dextrose 40% Gel 15 Gram(s) once PRN  dextrose 5%. 1000 milliLiter(s) <Continuous>  dextrose 50% Injectable 12.5 Gram(s) once  dextrose 50% Injectable 25 Gram(s) once  dextrose 50% Injectable 25 Gram(s) once  enoxaparin Injectable 70 milliGRAM(s) every 12 hours  famotidine    Tablet 20 milliGRAM(s) daily  glucagon  Injectable 1 milliGRAM(s) once PRN  hydrALAZINE 50 milliGRAM(s) daily  insulin glargine Injectable (LANTUS) 25 Unit(s) at bedtime  insulin lispro (HumaLOG) corrective regimen sliding scale   three times a day before meals  insulin lispro (HumaLOG) corrective regimen sliding scale   at bedtime  insulin lispro Injectable (HumaLOG) 6 Unit(s) three times a day before meals  metoprolol tartrate 25 milliGRAM(s) every 12 hours  simvastatin 20 milliGRAM(s) at bedtime  sodium chloride 0.65% Nasal 1 Spray(s) four times a day  sodium chloride 0.9% Bolus 250 milliLiter(s) once  sodium chloride 0.9%. 500 milliLiter(s) <Continuous>      RECENT LABS - Reviewed                        11.8   14.25 )-----------( 437      ( 2020 07:35 )             35.8     04-    139  |  98  |  21.0<H>  ----------------------------<  187<H>  4.1   |  28.0  |  0.67    Ca    9.4      2020 07:33    TPro  6.6  /  Alb  2.9<L>  /  TBili  0.4  /  DBili  x   /  AST  21  /  ALT  17  /  AlkPhos  82  04-21      Urinalysis Basic - ( 2020 13:15 )    Color: Yellow / Appearance: Clear / S.015 / pH: x  Gluc: x / Ketone: Negative  / Bili: Negative / Urobili: Negative mg/dL   Blood: x / Protein: 30 mg/dL / Nitrite: Negative   Leuk Esterase: Small / RBC: 0-2 /HPF / WBC 3-5   Sq Epi: x / Non Sq Epi: Occasional / Bacteria: Negative          ----------------------------------------------------------------------------------------  PHYSICAL EXAM  Constitutional - NAD, Comfortable  Extremities - Right UE edema  Neurologic Exam -                    Cognitive - Awake, Alert, AAO to self, hospital, stroke     Communication - Expressive deficits, Slowed processing, Dysarthria     Cranial Nerves - Right facial droop     Motor - Right hemiparesis                    LEFT    UE - ShAB 5/5, EF 5/5, EE 5/5, WE 5/5,  5/5                    RIGHT UE - ShAB 1/5, EF 0/5, EE 0/5,  1/5                    LEFT    LE - HF 5/5, KE 5/5, DF 5/5, PF 5/5                    RIGHT LE - HF 0/5, KE 0/5, DF 0/5, PF 0/5        Sensory - Intact to LT  Psychiatric - Mood stable, Affect Flat  ----------------------------------------------------------------------------------------  ASSESSMENT/PLAN  69yFemale with functional deficits after COVID related complications  COVID - Stable  CVA - ASA, Lovenox, Lipitor  DM2 - Lantus, Humalog  HTN - Lopressor, Hydralazine  Pain - Tylenol  DVT PPX - SCDs  Rehab - Recommend ACUTE inpatient rehabilitation for the functional deficits consisting of 3 hours of therapy/day & 24 hour RN/daily PMR physician for comorbid medical management. Will continue to follow for ongoing rehab needs and recommendations. Patient will be able to tolerate 3 hours a day.    Continue bedside therapy as well as OOB throughout the day with mobilization throughout the day with staff to maintain cardiopulmonary function and prevention of secondary complications related to debility.      At the time of this notation, as per administration at [facility], admission to Acute Inpatient Rehabilitation Facility of a COVID19+ patient must meet the following criteria:    1. Be at least 7 days post-first COVID + test  2. Be without FEVER or USE OF ANTIPYRETICS for  >72 hours  3. Be able to perform transfers or bed mobility with at most 50% assist (i.e. moderate assistance)  4. Be medically stable with resolution of primary symptoms and can tolerate 3 hours of therapy   5. Have a discharge plan from rehab is to home upon completion of AR    The above requirements may change and will be updated accordingly.     Discussed with rehab liaison

## 2020-04-22 LAB
APCR PPP: 2.36 RATIO — SIGNIFICANT CHANGE UP
GLUCOSE BLDC GLUCOMTR-MCNC: 249 MG/DL — HIGH (ref 70–99)
GLUCOSE BLDC GLUCOMTR-MCNC: 257 MG/DL — HIGH (ref 70–99)
GLUCOSE BLDC GLUCOMTR-MCNC: 271 MG/DL — HIGH (ref 70–99)
GLUCOSE BLDC GLUCOMTR-MCNC: 291 MG/DL — HIGH (ref 70–99)

## 2020-04-22 PROCEDURE — 99233 SBSQ HOSP IP/OBS HIGH 50: CPT

## 2020-04-22 RX ORDER — AMLODIPINE BESYLATE 2.5 MG/1
1 TABLET ORAL
Qty: 0 | Refills: 0 | DISCHARGE
Start: 2020-04-22

## 2020-04-22 RX ORDER — HYDRALAZINE HCL 50 MG
1 TABLET ORAL
Qty: 0 | Refills: 0 | DISCHARGE
Start: 2020-04-22

## 2020-04-22 RX ORDER — FLUOXETINE HCL 10 MG
1 CAPSULE ORAL
Qty: 0 | Refills: 0 | DISCHARGE
Start: 2020-04-22

## 2020-04-22 RX ORDER — ACETAMINOPHEN 500 MG
2 TABLET ORAL
Qty: 0 | Refills: 0 | DISCHARGE
Start: 2020-04-22

## 2020-04-22 RX ORDER — AMLODIPINE BESYLATE 2.5 MG/1
1 TABLET ORAL
Qty: 0 | Refills: 0 | DISCHARGE

## 2020-04-22 RX ORDER — SIMVASTATIN 20 MG/1
1 TABLET, FILM COATED ORAL
Qty: 0 | Refills: 0 | DISCHARGE

## 2020-04-22 RX ORDER — FAMOTIDINE 10 MG/ML
1 INJECTION INTRAVENOUS
Qty: 0 | Refills: 0 | DISCHARGE
Start: 2020-04-22

## 2020-04-22 RX ORDER — INSULIN ZINC HUMAN RECOMBINANT 100/ML
0 VIAL (ML) SUBCUTANEOUS
Qty: 0 | Refills: 0 | DISCHARGE

## 2020-04-22 RX ORDER — METOPROLOL TARTRATE 50 MG
1 TABLET ORAL
Qty: 0 | Refills: 0 | DISCHARGE
Start: 2020-04-22

## 2020-04-22 RX ORDER — FAMOTIDINE 10 MG/ML
1 INJECTION INTRAVENOUS
Qty: 0 | Refills: 0 | DISCHARGE

## 2020-04-22 RX ORDER — FLUOXETINE HCL 10 MG
10 CAPSULE ORAL DAILY
Refills: 0 | Status: DISCONTINUED | OUTPATIENT
Start: 2020-04-22 | End: 2020-04-23

## 2020-04-22 RX ORDER — LANOLIN ALCOHOL/MO/W.PET/CERES
1 CREAM (GRAM) TOPICAL
Qty: 0 | Refills: 0 | DISCHARGE
Start: 2020-04-22

## 2020-04-22 RX ORDER — ENOXAPARIN SODIUM 100 MG/ML
70 INJECTION SUBCUTANEOUS
Qty: 0 | Refills: 0 | DISCHARGE
Start: 2020-04-22

## 2020-04-22 RX ORDER — LANOLIN ALCOHOL/MO/W.PET/CERES
3 CREAM (GRAM) TOPICAL AT BEDTIME
Refills: 0 | Status: DISCONTINUED | OUTPATIENT
Start: 2020-04-22 | End: 2020-04-23

## 2020-04-22 RX ORDER — ASPIRIN/CALCIUM CARB/MAGNESIUM 324 MG
1 TABLET ORAL
Qty: 0 | Refills: 0 | DISCHARGE
Start: 2020-04-22

## 2020-04-22 RX ORDER — HYDRALAZINE HCL 50 MG
1 TABLET ORAL
Qty: 0 | Refills: 0 | DISCHARGE

## 2020-04-22 RX ORDER — INSULIN GLARGINE 100 [IU]/ML
25 INJECTION, SOLUTION SUBCUTANEOUS
Qty: 0 | Refills: 0 | DISCHARGE
Start: 2020-04-22

## 2020-04-22 RX ORDER — SIMVASTATIN 20 MG/1
1 TABLET, FILM COATED ORAL
Qty: 0 | Refills: 0 | DISCHARGE
Start: 2020-04-22

## 2020-04-22 RX ADMIN — SIMVASTATIN 20 MILLIGRAM(S): 20 TABLET, FILM COATED ORAL at 20:20

## 2020-04-22 RX ADMIN — Medication 25 MILLIGRAM(S): at 17:32

## 2020-04-22 RX ADMIN — AMLODIPINE BESYLATE 5 MILLIGRAM(S): 2.5 TABLET ORAL at 05:24

## 2020-04-22 RX ADMIN — Medication 6 UNIT(S): at 12:31

## 2020-04-22 RX ADMIN — ENOXAPARIN SODIUM 70 MILLIGRAM(S): 100 INJECTION SUBCUTANEOUS at 08:42

## 2020-04-22 RX ADMIN — Medication 100 MILLIGRAM(S): at 20:19

## 2020-04-22 RX ADMIN — Medication 81 MILLIGRAM(S): at 08:43

## 2020-04-22 RX ADMIN — Medication 1 SPRAY(S): at 00:25

## 2020-04-22 RX ADMIN — INSULIN GLARGINE 25 UNIT(S): 100 INJECTION, SOLUTION SUBCUTANEOUS at 20:20

## 2020-04-22 RX ADMIN — Medication 3: at 17:25

## 2020-04-22 RX ADMIN — Medication 1 SPRAY(S): at 11:45

## 2020-04-22 RX ADMIN — Medication 1 SPRAY(S): at 16:25

## 2020-04-22 RX ADMIN — FAMOTIDINE 20 MILLIGRAM(S): 10 INJECTION INTRAVENOUS at 08:43

## 2020-04-22 RX ADMIN — Medication 100 MILLIGRAM(S): at 05:24

## 2020-04-22 RX ADMIN — Medication 6 UNIT(S): at 17:26

## 2020-04-22 RX ADMIN — Medication 3: at 08:43

## 2020-04-22 RX ADMIN — Medication 1 SPRAY(S): at 05:24

## 2020-04-22 RX ADMIN — Medication 3: at 12:29

## 2020-04-22 RX ADMIN — Medication 100 MILLIGRAM(S): at 12:29

## 2020-04-22 RX ADMIN — ENOXAPARIN SODIUM 70 MILLIGRAM(S): 100 INJECTION SUBCUTANEOUS at 17:32

## 2020-04-22 RX ADMIN — Medication 50 MILLIGRAM(S): at 05:24

## 2020-04-22 RX ADMIN — Medication 3 MILLIGRAM(S): at 20:20

## 2020-04-22 RX ADMIN — Medication 10 MILLIGRAM(S): at 13:31

## 2020-04-22 RX ADMIN — Medication 25 MILLIGRAM(S): at 05:24

## 2020-04-22 RX ADMIN — Medication 6 UNIT(S): at 08:44

## 2020-04-22 NOTE — PROGRESS NOTE ADULT - SUBJECTIVE AND OBJECTIVE BOX
Pt remains stable.  Awaiting acute rehab placement today    oe:  cvs:  s1, s2         rs: cta bilaterally         gi:  soft, non tender         ext;  no cyanosis, clubbing, edema         neuro:  right hemiparesis    a/p:  left parietal cva - continue pt          htn - controlled          hyperlipidemia - continue present mgmt          dc to acute rehab today

## 2020-04-22 NOTE — PROGRESS NOTE ADULT - SUBJECTIVE AND OBJECTIVE BOX
Patient doing well today.  Reports no pain.   Did not sleep well last night.   Right sided weakness is improved.     REVIEW OF SYSTEMS  Constitutional - No fever,  +fatigue  Neurological - No headaches, +memory loss, +loss of strength, No numbness, No tremors  Skin - No rashes, No lesions   Musculoskeletal - No joint pain, No joint swelling, No muscle pain  Psychiatric - No depression, No anxiety    FUNCTIONAL PROGRESS    Bed Mobility  Bed Mobility Training Rolling/Turning: moderate assist (50% patient effort);  1 person assist;  verbal cues;  nonverbal cues (demo/gestures);  Physical assist secondary to right sided hemiparesis. Verbal/nonverbal cues required secondary to decreased attention, processing speed, problem solving and sequencing.;  bed rails;  head of bed elevated  Bed Mobility Training Scooting: moderate assist (50% patient effort);  1 person assist;  verbal cues;  nonverbal cues (demo/gestures);  to scoot to edge of bed secondary to right sided hemiparesis. Verbal/nonverbal cues required secondary to decreased attention, processing speed, problem solving and sequencing.;  bed rails  Bed Mobility Training Sit-to-Supine: moderate assist (50% patient effort);  verbal cues;  nonverbal cues (demo/gestures);  Physical assist to lift right LE onto bed, patient able to assist with left LE, taught strategies to use left LE to assist Right LE. Patient able to lower trunk onto bed. Verbal/nonverbal cues required secondary to decerased coginition. processing, sequencing, attention and problem solving ability.;  head of bed elevated  Bed Mobility Training Supine-to-Sit: moderate assist (50% patient effort);  1 person assist;  verbal cues;  nonverbal cues (demo/gestures);  Physical assist to lift right LE off of bed, patient able to assist with left LE, taught strategies to use left LE to assist Right LE secondary to right hemiparesis. Physical assist to achieve upright trunk posture seated at edge of bed secondary to right hemiparesis. Verbal/nonverbal cues required secondary to decerased coginition. processing, sequencing, attention and problem solving ability.;  head of bed elevated;  bed rails  Bed Mobility Training Limitations: decreased ability to use arms for pushing/pulling;  decreased ability to use legs for bridging/pushing;  abnormal muscle tone;  impaired balance;  cognitive, decreased safety awareness;  impaired coordination;  deconditioned;  decreased strength;  decreased ROM    Therapeutic Exercise  Therapeutic Exercise Detail: Left UE therex for AROM for shoulder flex/ext, elbow flex/ext, pronation/supination, wrist flex/ext, gross grasp and digit extension 5 reps 2 sets. Right UE therex for AAROM for shoulder flex/ext, elbow flex/ext, pronation/supination, wrist flex/ext 5 reps, 2 sets. Right UE AROM for gross grasp and digit extension 5 reps 2 sets. ROM therex performed to maintain muscle integrity, prevent atrophy and to facilitate participation in ADLs.     Neuromuscular Re-education  Neuromuscular Re-education Detail: Patient performed static sitting balance at edge of bed with min A to maintain upright trunk position. Patient used Left UE to support self using bed rails but fatigued quickly. Patient perfromed dynamic sitting balance at edge of bed with Mod A to facilitate participation with ADLs. Patient educated on energy conservation techniques and encouraged to take frequent rest breaks.     Lower Body Dressing Training  Lower Body Dressing Training Assistance: maximum assist (25% patient effort);  nonverbal cues (demo/gestures);  verbal cues;  1 person assist;  Physical assist to don/doff socks seated at edge of bed. Patient able to self support using bed rails and left UE. Verbal/non verbal cues and repetition requrired  secondary to decreased cognition, processing, attention, sequencing and problem solving abilities.;  abnormal muscle tone;  decreased flexibility;  decreased strength;  impaired balance;  cognitive, decreased safety awareness          VITALS  T(C): 36.4 (20 @ 08:53), Max: 37.1 (20 @ 20:29)  HR: 89 (20 @ 08:53) (89 - 102)  BP: 151/75 (20 @ 08:53) (132/62 - 164/75)  RR: 20 (20 @ 08:53) (18 - 24)  SpO2: 98% (20 @ 08:53) (97% - 98%)  Wt(kg): --    MEDICATIONS   acetaminophen   Tablet .. 650 milliGRAM(s) every 6 hours PRN  amLODIPine   Tablet 5 milliGRAM(s) daily  aspirin enteric coated 81 milliGRAM(s) daily  benzonatate 100 milliGRAM(s) every 8 hours  dextrose 40% Gel 15 Gram(s) once PRN  dextrose 5%. 1000 milliLiter(s) <Continuous>  dextrose 50% Injectable 12.5 Gram(s) once  dextrose 50% Injectable 25 Gram(s) once  dextrose 50% Injectable 25 Gram(s) once  enoxaparin Injectable 70 milliGRAM(s) every 12 hours  famotidine    Tablet 20 milliGRAM(s) daily  FLUoxetine 10 milliGRAM(s) daily  glucagon  Injectable 1 milliGRAM(s) once PRN  hydrALAZINE 50 milliGRAM(s) daily  insulin glargine Injectable (LANTUS) 25 Unit(s) at bedtime  insulin lispro (HumaLOG) corrective regimen sliding scale   three times a day before meals  insulin lispro (HumaLOG) corrective regimen sliding scale   at bedtime  insulin lispro Injectable (HumaLOG) 6 Unit(s) three times a day before meals  melatonin 3 milliGRAM(s) at bedtime  metoprolol tartrate 25 milliGRAM(s) every 12 hours  simvastatin 20 milliGRAM(s) at bedtime  sodium chloride 0.65% Nasal 1 Spray(s) four times a day  sodium chloride 0.9% Bolus 250 milliLiter(s) once  sodium chloride 0.9%. 500 milliLiter(s) <Continuous>      RECENT LABS - Reviewed                        11.8   14.25 )-----------( 437      ( 2020 07:35 )             35.8     04-    139  |  98  |  21.0<H>  ----------------------------<  187<H>  4.1   |  28.0  |  0.67    Ca    9.4      2020 07:33    TPro  6.6  /  Alb  2.9<L>  /  TBili  0.4  /  DBili  x   /  AST  21  /  ALT  17  /  AlkPhos  82  04-      Urinalysis Basic - ( 2020 13:15 )    Color: Yellow / Appearance: Clear / S.015 / pH: x  Gluc: x / Ketone: Negative  / Bili: Negative / Urobili: Negative mg/dL   Blood: x / Protein: 30 mg/dL / Nitrite: Negative   Leuk Esterase: Small / RBC: 0-2 /HPF / WBC 3-5   Sq Epi: x / Non Sq Epi: Occasional / Bacteria: Negative            ----------------------------------------------------------------------------------------  PHYSICAL EXAM  Constitutional - NAD, Comfortable  Extremities - Right UE edema  Neurologic Exam -                    Cognitive - Awake, Alert, AAO to self, hospital, stroke     Communication - Expressive deficits, Slowed processing, Dysarthria     Cranial Nerves - Right facial droop     Motor - Right hemiparesis, Motor Apraxia                    LEFT    UE - ShAB 5/5, EF 5/5, EE 5/5, WE 5/5,  5/5                    RIGHT UE - ShAB 1/5, EF 1/5, EE 1/5,  1/5                    LEFT    LE - HF 5/5, KE 5/5, DF 5/5, PF 5/5                    RIGHT LE - HF 0/5, KE 0/5, DF 1/5, PF 0/5        Sensory - Intact to LT  Psychiatric - Mood stable, Affect Flat  ----------------------------------------------------------------------------------------  ASSESSMENT/PLAN  69yFemale with functional deficits after COVID related complications  COVID - Tessalon, Lovenox (hypercoag panel pending)  CVA - ASA, Zocor  Right hemiparesis/Mood - Prozac 10mg ()  Sleep - Melatonin 5mg HS ()  DM2 - Lantus, Humalog  HTN - Lopressor, Hydralazine, Norvasc  Pain - Tylenol  DVT PPX - SCDs, Lovneox  Rehab - Recommend ACUTE inpatient rehabilitation for the functional deficits consisting of 3 hours of therapy/day & 24 hour RN/daily PMR physician for comorbid medical management. Will continue to follow for ongoing rehab needs and recommendations. Patient will be able to tolerate 3 hours a day.    Continue bedside therapy as well as OOB throughout the day with mobilization throughout the day with staff to maintain cardiopulmonary function and prevention of secondary complications related to debility.      At the time of this notation, as per administration at [facility], admission to Acute Inpatient Rehabilitation Facility of a COVID19+ patient must meet the following criteria:    1. Be at least 7 days post-first COVID + test  2. Be without FEVER or USE OF ANTIPYRETICS for  >72 hours  3. Be able to perform transfers or bed mobility with at most 50% assist (i.e. moderate assistance)  4. Be medically stable with resolution of primary symptoms and can tolerate 3 hours of therapy   5. Have a discharge plan from rehab is to home upon completion of AR    The above requirements may change and will be updated accordingly.     Discussed with rehab liaison and NP

## 2020-04-23 ENCOUNTER — INPATIENT (INPATIENT)
Facility: HOSPITAL | Age: 70
LOS: 35 days | Discharge: HOME HEALTH | DRG: 949 | End: 2020-05-29
Attending: PHYSICAL MEDICINE & REHABILITATION | Admitting: PHYSICAL MEDICINE & REHABILITATION
Payer: COMMERCIAL

## 2020-04-23 ENCOUNTER — TRANSCRIPTION ENCOUNTER (OUTPATIENT)
Age: 70
End: 2020-04-23

## 2020-04-23 VITALS
RESPIRATION RATE: 17 BRPM | OXYGEN SATURATION: 92 % | DIASTOLIC BLOOD PRESSURE: 56 MMHG | SYSTOLIC BLOOD PRESSURE: 145 MMHG | HEART RATE: 88 BPM | TEMPERATURE: 98 F

## 2020-04-23 VITALS
TEMPERATURE: 100 F | SYSTOLIC BLOOD PRESSURE: 139 MMHG | OXYGEN SATURATION: 99 % | RESPIRATION RATE: 16 BRPM | DIASTOLIC BLOOD PRESSURE: 74 MMHG | HEART RATE: 101 BPM

## 2020-04-23 DIAGNOSIS — Z86.011 PERSONAL HISTORY OF BENIGN NEOPLASM OF THE BRAIN: Chronic | ICD-10-CM

## 2020-04-23 DIAGNOSIS — I63.9 CEREBRAL INFARCTION, UNSPECIFIED: ICD-10-CM

## 2020-04-23 PROBLEM — Z87.19 PERSONAL HISTORY OF OTHER DISEASES OF THE DIGESTIVE SYSTEM: Chronic | Status: ACTIVE | Noted: 2020-04-09

## 2020-04-23 LAB
GLUCOSE BLDC GLUCOMTR-MCNC: 229 MG/DL — HIGH (ref 70–99)
GLUCOSE BLDC GLUCOMTR-MCNC: 260 MG/DL — HIGH (ref 70–99)
PROT S FREE PPP-ACNC: 125 % NORMAL — SIGNIFICANT CHANGE UP (ref 60–140)

## 2020-04-23 PROCEDURE — 83935 ASSAY OF URINE OSMOLALITY: CPT

## 2020-04-23 PROCEDURE — 82728 ASSAY OF FERRITIN: CPT

## 2020-04-23 PROCEDURE — 80053 COMPREHEN METABOLIC PANEL: CPT

## 2020-04-23 PROCEDURE — 84484 ASSAY OF TROPONIN QUANT: CPT

## 2020-04-23 PROCEDURE — 80048 BASIC METABOLIC PNL TOTAL CA: CPT

## 2020-04-23 PROCEDURE — 82553 CREATINE MB FRACTION: CPT

## 2020-04-23 PROCEDURE — C1769: CPT

## 2020-04-23 PROCEDURE — 36415 COLL VENOUS BLD VENIPUNCTURE: CPT

## 2020-04-23 PROCEDURE — 81001 URINALYSIS AUTO W/SCOPE: CPT

## 2020-04-23 PROCEDURE — 80061 LIPID PANEL: CPT

## 2020-04-23 PROCEDURE — T1013: CPT

## 2020-04-23 PROCEDURE — 97535 SELF CARE MNGMENT TRAINING: CPT

## 2020-04-23 PROCEDURE — 33274 TCAT INSJ/RPL PERM LDLS PM: CPT | Mod: Q0

## 2020-04-23 PROCEDURE — 93005 ELECTROCARDIOGRAM TRACING: CPT

## 2020-04-23 PROCEDURE — 85610 PROTHROMBIN TIME: CPT

## 2020-04-23 PROCEDURE — 97530 THERAPEUTIC ACTIVITIES: CPT

## 2020-04-23 PROCEDURE — 85730 THROMBOPLASTIN TIME PARTIAL: CPT

## 2020-04-23 PROCEDURE — 86900 BLOOD TYPING SEROLOGIC ABO: CPT

## 2020-04-23 PROCEDURE — 70496 CT ANGIOGRAPHY HEAD: CPT

## 2020-04-23 PROCEDURE — 84100 ASSAY OF PHOSPHORUS: CPT

## 2020-04-23 PROCEDURE — 82962 GLUCOSE BLOOD TEST: CPT

## 2020-04-23 PROCEDURE — 70498 CT ANGIOGRAPHY NECK: CPT

## 2020-04-23 PROCEDURE — 85027 COMPLETE CBC AUTOMATED: CPT

## 2020-04-23 PROCEDURE — 87635 SARS-COV-2 COVID-19 AMP PRB: CPT

## 2020-04-23 PROCEDURE — 97110 THERAPEUTIC EXERCISES: CPT

## 2020-04-23 PROCEDURE — 71045 X-RAY EXAM CHEST 1 VIEW: CPT

## 2020-04-23 PROCEDURE — 84300 ASSAY OF URINE SODIUM: CPT

## 2020-04-23 PROCEDURE — 97163 PT EVAL HIGH COMPLEX 45 MIN: CPT

## 2020-04-23 PROCEDURE — 85300 ANTITHROMBIN III ACTIVITY: CPT

## 2020-04-23 PROCEDURE — 85613 RUSSELL VIPER VENOM DILUTED: CPT

## 2020-04-23 PROCEDURE — 82570 ASSAY OF URINE CREATININE: CPT

## 2020-04-23 PROCEDURE — 83036 HEMOGLOBIN GLYCOSYLATED A1C: CPT

## 2020-04-23 PROCEDURE — 86923 COMPATIBILITY TEST ELECTRIC: CPT

## 2020-04-23 PROCEDURE — 97167 OT EVAL HIGH COMPLEX 60 MIN: CPT

## 2020-04-23 PROCEDURE — 86146 BETA-2 GLYCOPROTEIN ANTIBODY: CPT

## 2020-04-23 PROCEDURE — 85301 ANTITHROMBIN III ANTIGEN: CPT

## 2020-04-23 PROCEDURE — 99285 EMERGENCY DEPT VISIT HI MDM: CPT

## 2020-04-23 PROCEDURE — 87040 BLOOD CULTURE FOR BACTERIA: CPT

## 2020-04-23 PROCEDURE — 86901 BLOOD TYPING SEROLOGIC RH(D): CPT

## 2020-04-23 PROCEDURE — 84550 ASSAY OF BLOOD/URIC ACID: CPT

## 2020-04-23 PROCEDURE — 83615 LACTATE (LD) (LDH) ENZYME: CPT

## 2020-04-23 PROCEDURE — 82550 ASSAY OF CK (CPK): CPT

## 2020-04-23 PROCEDURE — 70450 CT HEAD/BRAIN W/O DYE: CPT

## 2020-04-23 PROCEDURE — 86147 CARDIOLIPIN ANTIBODY EA IG: CPT

## 2020-04-23 PROCEDURE — C1894: CPT

## 2020-04-23 PROCEDURE — 86850 RBC ANTIBODY SCREEN: CPT

## 2020-04-23 PROCEDURE — 84145 PROCALCITONIN (PCT): CPT

## 2020-04-23 PROCEDURE — 99233 SBSQ HOSP IP/OBS HIGH 50: CPT

## 2020-04-23 PROCEDURE — 85379 FIBRIN DEGRADATION QUANT: CPT

## 2020-04-23 PROCEDURE — 83090 ASSAY OF HOMOCYSTEINE: CPT

## 2020-04-23 PROCEDURE — 83605 ASSAY OF LACTIC ACID: CPT

## 2020-04-23 PROCEDURE — 84443 ASSAY THYROID STIM HORMONE: CPT

## 2020-04-23 PROCEDURE — 94640 AIRWAY INHALATION TREATMENT: CPT

## 2020-04-23 PROCEDURE — C1786: CPT

## 2020-04-23 PROCEDURE — 85307 ASSAY ACTIVATED PROTEIN C: CPT

## 2020-04-23 PROCEDURE — 83735 ASSAY OF MAGNESIUM: CPT

## 2020-04-23 PROCEDURE — 86140 C-REACTIVE PROTEIN: CPT

## 2020-04-23 PROCEDURE — 85303 CLOT INHIBIT PROT C ACTIVITY: CPT

## 2020-04-23 PROCEDURE — 86803 HEPATITIS C AB TEST: CPT

## 2020-04-23 PROCEDURE — C8929: CPT

## 2020-04-23 PROCEDURE — 0042T: CPT

## 2020-04-23 PROCEDURE — 97112 NEUROMUSCULAR REEDUCATION: CPT

## 2020-04-23 PROCEDURE — 99223 1ST HOSP IP/OBS HIGH 75: CPT

## 2020-04-23 PROCEDURE — 85306 CLOT INHIBIT PROT S FREE: CPT

## 2020-04-23 RX ORDER — FAMOTIDINE 10 MG/ML
20 INJECTION INTRAVENOUS DAILY
Refills: 0 | Status: DISCONTINUED | OUTPATIENT
Start: 2020-04-24 | End: 2020-05-29

## 2020-04-23 RX ORDER — HYDRALAZINE HCL 50 MG
50 TABLET ORAL DAILY
Refills: 0 | Status: DISCONTINUED | OUTPATIENT
Start: 2020-04-24 | End: 2020-05-27

## 2020-04-23 RX ORDER — DEXTROSE 50 % IN WATER 50 %
15 SYRINGE (ML) INTRAVENOUS ONCE
Refills: 0 | Status: DISCONTINUED | OUTPATIENT
Start: 2020-04-23 | End: 2020-05-29

## 2020-04-23 RX ORDER — DEXTROSE 50 % IN WATER 50 %
25 SYRINGE (ML) INTRAVENOUS ONCE
Refills: 0 | Status: DISCONTINUED | OUTPATIENT
Start: 2020-04-23 | End: 2020-05-29

## 2020-04-23 RX ORDER — ATORVASTATIN CALCIUM 80 MG/1
10 TABLET, FILM COATED ORAL AT BEDTIME
Refills: 0 | Status: DISCONTINUED | OUTPATIENT
Start: 2020-04-23 | End: 2020-05-29

## 2020-04-23 RX ORDER — SODIUM CHLORIDE 9 MG/ML
1000 INJECTION, SOLUTION INTRAVENOUS
Refills: 0 | Status: DISCONTINUED | OUTPATIENT
Start: 2020-04-23 | End: 2020-05-29

## 2020-04-23 RX ORDER — FLUOXETINE HCL 10 MG
10 CAPSULE ORAL DAILY
Refills: 0 | Status: DISCONTINUED | OUTPATIENT
Start: 2020-04-24 | End: 2020-05-13

## 2020-04-23 RX ORDER — SODIUM CHLORIDE 0.65 %
1 AEROSOL, SPRAY (ML) NASAL
Refills: 0 | Status: DISCONTINUED | OUTPATIENT
Start: 2020-04-23 | End: 2020-05-29

## 2020-04-23 RX ORDER — LANOLIN ALCOHOL/MO/W.PET/CERES
3 CREAM (GRAM) TOPICAL AT BEDTIME
Refills: 0 | Status: DISCONTINUED | OUTPATIENT
Start: 2020-04-23 | End: 2020-05-29

## 2020-04-23 RX ORDER — METOPROLOL TARTRATE 50 MG
25 TABLET ORAL EVERY 12 HOURS
Refills: 0 | Status: DISCONTINUED | OUTPATIENT
Start: 2020-04-23 | End: 2020-05-29

## 2020-04-23 RX ORDER — INSULIN LISPRO 100/ML
VIAL (ML) SUBCUTANEOUS
Refills: 0 | Status: DISCONTINUED | OUTPATIENT
Start: 2020-04-23 | End: 2020-04-30

## 2020-04-23 RX ORDER — GLUCAGON INJECTION, SOLUTION 0.5 MG/.1ML
1 INJECTION, SOLUTION SUBCUTANEOUS ONCE
Refills: 0 | Status: DISCONTINUED | OUTPATIENT
Start: 2020-04-23 | End: 2020-05-29

## 2020-04-23 RX ORDER — ASPIRIN/CALCIUM CARB/MAGNESIUM 324 MG
81 TABLET ORAL DAILY
Refills: 0 | Status: DISCONTINUED | OUTPATIENT
Start: 2020-04-24 | End: 2020-05-09

## 2020-04-23 RX ORDER — INSULIN LISPRO 100/ML
6 VIAL (ML) SUBCUTANEOUS
Refills: 0 | Status: DISCONTINUED | OUTPATIENT
Start: 2020-04-23 | End: 2020-04-27

## 2020-04-23 RX ORDER — INSULIN GLARGINE 100 [IU]/ML
25 INJECTION, SOLUTION SUBCUTANEOUS AT BEDTIME
Refills: 0 | Status: DISCONTINUED | OUTPATIENT
Start: 2020-04-23 | End: 2020-04-27

## 2020-04-23 RX ORDER — INSULIN LISPRO 100/ML
VIAL (ML) SUBCUTANEOUS AT BEDTIME
Refills: 0 | Status: DISCONTINUED | OUTPATIENT
Start: 2020-04-23 | End: 2020-04-30

## 2020-04-23 RX ORDER — DEXTROSE 50 % IN WATER 50 %
12.5 SYRINGE (ML) INTRAVENOUS ONCE
Refills: 0 | Status: DISCONTINUED | OUTPATIENT
Start: 2020-04-23 | End: 2020-05-29

## 2020-04-23 RX ORDER — AMLODIPINE BESYLATE 2.5 MG/1
5 TABLET ORAL DAILY
Refills: 0 | Status: DISCONTINUED | OUTPATIENT
Start: 2020-04-24 | End: 2020-05-29

## 2020-04-23 RX ORDER — ACETAMINOPHEN 500 MG
650 TABLET ORAL EVERY 6 HOURS
Refills: 0 | Status: DISCONTINUED | OUTPATIENT
Start: 2020-04-23 | End: 2020-05-29

## 2020-04-23 RX ORDER — SIMVASTATIN 20 MG/1
20 TABLET, FILM COATED ORAL AT BEDTIME
Refills: 0 | Status: DISCONTINUED | OUTPATIENT
Start: 2020-04-23 | End: 2020-04-23

## 2020-04-23 RX ORDER — ENOXAPARIN SODIUM 100 MG/ML
70 INJECTION SUBCUTANEOUS EVERY 12 HOURS
Refills: 0 | Status: DISCONTINUED | OUTPATIENT
Start: 2020-04-23 | End: 2020-05-08

## 2020-04-23 RX ADMIN — Medication 50 MILLIGRAM(S): at 04:43

## 2020-04-23 RX ADMIN — Medication 1 SPRAY(S): at 17:43

## 2020-04-23 RX ADMIN — Medication 6 UNIT(S): at 16:53

## 2020-04-23 RX ADMIN — INSULIN GLARGINE 25 UNIT(S): 100 INJECTION, SOLUTION SUBCUTANEOUS at 23:20

## 2020-04-23 RX ADMIN — ENOXAPARIN SODIUM 70 MILLIGRAM(S): 100 INJECTION SUBCUTANEOUS at 04:45

## 2020-04-23 RX ADMIN — Medication 1 SPRAY(S): at 04:44

## 2020-04-23 RX ADMIN — Medication 100 MILLIGRAM(S): at 12:00

## 2020-04-23 RX ADMIN — ATORVASTATIN CALCIUM 10 MILLIGRAM(S): 80 TABLET, FILM COATED ORAL at 23:20

## 2020-04-23 RX ADMIN — ENOXAPARIN SODIUM 70 MILLIGRAM(S): 100 INJECTION SUBCUTANEOUS at 17:42

## 2020-04-23 RX ADMIN — Medication 25 MILLIGRAM(S): at 04:44

## 2020-04-23 RX ADMIN — Medication 6 UNIT(S): at 08:34

## 2020-04-23 RX ADMIN — Medication 3: at 16:52

## 2020-04-23 RX ADMIN — Medication 100 MILLIGRAM(S): at 23:21

## 2020-04-23 RX ADMIN — Medication 1 SPRAY(S): at 11:59

## 2020-04-23 RX ADMIN — Medication 100 MILLIGRAM(S): at 04:44

## 2020-04-23 RX ADMIN — Medication 25 MILLIGRAM(S): at 17:43

## 2020-04-23 RX ADMIN — Medication 10 MILLIGRAM(S): at 11:59

## 2020-04-23 RX ADMIN — FAMOTIDINE 20 MILLIGRAM(S): 10 INJECTION INTRAVENOUS at 11:59

## 2020-04-23 RX ADMIN — Medication 81 MILLIGRAM(S): at 12:00

## 2020-04-23 RX ADMIN — Medication 3: at 08:34

## 2020-04-23 RX ADMIN — Medication 1 SPRAY(S): at 23:20

## 2020-04-23 RX ADMIN — AMLODIPINE BESYLATE 5 MILLIGRAM(S): 2.5 TABLET ORAL at 04:45

## 2020-04-23 NOTE — H&P ADULT - NSHPSOCIALHISTORY_GEN_ALL_CORE
SOCIAL HISTORY  Smoking - Denied  EtOH - Denied   Drugs - Denied    FUNCTIONAL HISTORY  Lives with family, [address] apartment  Independent with ADLs and ambulation    FUNCTIONAL STATUS:  bed mobility - modA x1  transfers - maxA x1  ambulation - pending

## 2020-04-23 NOTE — H&P ADULT - ASSESSMENT
ASSESSMENT/PLAN  SHIREEN CASH is a 68yo Female with ______      #Left frontal/parietal CVA  - Gait Instability, ADL impairments and Functional impairments: start Comprehensive Rehab Program of PT/OT/SLP  - cont ASA    #Acute Hypoxic Respiratory Failure   # COVID-19 PNA  - Date of COVID testin20  - Hydroxychloroquine day #   - Steroid:  - EKG - QTc: 500 (), incomplete RBBB  - Vitamin C - out of stock in hospital pharmacy at this time  - Incentive spirometry, robitussin DM, ocean spray    #HTN  - Lopressor 25 q12, amlodipine 5 qd, hydralazine 50mg qd  - monitor BP    #type II Mobitz block  - s/p Medtronic Micra AV+ leadless pacemaker  - outpt EP followup    #T2DM  - Lantus 25u qhs, humalog 6u TIDCC, SSI ACHS  - monitor fingersticks    #HLD  - cont simvastatin    #mood/sleep  - Prozac  - melatonin    #Pain Mgmt   - Tylenol PRN    #GI/Bowel Mgmt   - Continent  - pepcid    #/Bladder Mgmt   - Continent, PVR    #FEN   - Diet - Regular + Thins  [CCHO]    - Dysphagia  SLP - evaluation and treatment    #Precautions / PROPHYLAXIS:   - Falls  - ortho: Weight bearing status: WBAT   - Lungs: Aspiration, Incentive Spirometer   - Pressure injury/Skin: OOB to Chair, PT/OT    - DVT: Lovenox 70mg Q12h        MEDICAL PROGNOSIS: GOOD                                   REHAB POTENTIAL: GOOD  ESTIMATED DISPOSITION: HOME                             ELOS: 10-14 Days   EXPECTED THERAPY:     P.T. 1hr/day       O.T. 1hr/day      S.L.P. 1hr/day      EXP FREQUENCY: 5 days per 7 day period     PRESCREEN COMPARISON: I have reviewed the prescreen information and I have found no relevant changes between the preadmission screening and my post admission evaluation     RATIONALE FOR INPATIENT ADMISSION - Patient demonstrates the following: (check all that apply)  [X] Medically appropriate for rehabilitation admission  [X] Has attainable rehab goals with an appropriate initial discharge plan  [X] Has rehabilitation potential (expected to make a significant improvement within a reasonable period of time)   [X] Requires close medical managment by a rehab physician, rehab nursing care, Hospitalist and comprehensive interdisciplinary team (including PT, OT, & or SLP, Prosthetics and Orthotics) ASSESSMENT/PLAN  SHIREEN CASH is a 68yo Female with HTN, HLD, T2DM, hx CVA, GERD, known RBBB with COVID-19 debility and Left CVA.       #Left frontal/parietal CVA  - Gait Instability, ADL impairments and Functional impairments: start Comprehensive Rehab Program of PT/OT/SLP  - cont ASA  - fall and aspiration precautions  - cont prozac for motor recovery    #Acute Hypoxic Respiratory Failure   # COVID-19 PNA  - Date of COVID testin20  - Hydroxychloroquine not given, pt with prolonged QTc  - EKG - QTc: 500 (), incomplete RBBB  - Vitamin C - out of stock in hospital pharmacy at this time  - Incentive spirometry, robitussin DM, ocean spray    #HTN  - Lopressor 25 q12, amlodipine 5 qd, hydralazine 50mg qd  - monitor BP    #type II Mobitz block, known RBBB  - s/p Medtronic Micra AV+ leadless pacemaker  - outpt EP followup    #T2DM  - Lantus 25u qhs, humalog 6u TIDCC, SSI ACHS  - monitor fingersticks    #HLD  - cont simvastatin 20 qhs    #mood/sleep  - Prozac 10  - melatonin 3    #Pain Mgmt   - Tylenol PRN    #GI/Bowel Mgmt   - Continent  - pepcid    #/Bladder Mgmt   - Continent, PVR    #FEN   - Diet - Regular + Thins  [CCHO]    - Dysphagia  SLP - evaluation and treatment    #Precautions / PROPHYLAXIS:   - Falls  - ortho: Weight bearing status: WBAT   - Lungs: Aspiration, Incentive Spirometer   - Pressure injury/Skin: OOB to Chair, PT/OT    - DVT: Lovenox 70mg Q12h        MEDICAL PROGNOSIS: GOOD                                   REHAB POTENTIAL: GOOD  ESTIMATED DISPOSITION: HOME                             ELOS: 10-14 Days   EXPECTED THERAPY:     P.T. 1hr/day       O.T. 1hr/day      S.L.P. 1hr/day      EXP FREQUENCY: 5 days per 7 day period     PRESCREEN COMPARISON: I have reviewed the prescreen information and I have found no relevant changes between the preadmission screening and my post admission evaluation     RATIONALE FOR INPATIENT ADMISSION - Patient demonstrates the following: (check all that apply)  [X] Medically appropriate for rehabilitation admission  [X] Has attainable rehab goals with an appropriate initial discharge plan  [X] Has rehabilitation potential (expected to make a significant improvement within a reasonable period of time)   [X] Requires close medical managment by a rehab physician, rehab nursing care, Hospitalist and comprehensive interdisciplinary team (including PT, OT, & or SLP, Prosthetics and Orthotics) ASSESSMENT/PLAN  SHIREEN CASH is a 70yo Female with HTN, HLD, T2DM, hx CVA, GERD, known RBBB with COVID-19 debility and Left frontal and parietla CVA with residual right HP, patricio inattention aphasia    #Left frontal/parietal CVA  - Gait Instability, ADL impairments and Functional impairments: start Comprehensive Rehab Program of PT/OT/SLP  -neuropsychological evaluation  - cont ASA, lovenox therapeutic dose as may be hypercoagulable due to COVID.  - fall and aspiration precautions  - cont prozac for motor recovery  -PRECAUTIONS: airborne, contact, aspiration, cardiac, fall    #Acute Hypoxic Respiratory Failure   # COVID-19 PNA  - Date of COVID testin20  - Hydroxychloroquine not given, pt with prolonged QTc  - EKG - QTc: 500 (), incomplete RBBB  - Vitamin C - out of stock in hospital pharmacy at this time  - Incentive spirometry, robitussin DM, ocean spray  -hospitalist consult  -airborne and contact precautions    #HTN  - Lopressor 25 q12, amlodipine 5 qd, hydralazine 50mg qd  - monitor BP  hospitalist consult    #type II Mobitz block, known RBBB  - s/p Medtronic Micra AV+ leadless pacemaker  - outpt EP followup    #T2DM  - Lantus 25u qhs, humalog 6u TIDCC, SSI ACHS  - monitor fingersticks  -nutrition and hospitalist consults    #HLD  - cont simvastatin 20 qhs    #mood/sleep  - Prozac 10  - melatonin 3    #Pain Mgmt   - Tylenol PRN    #GI/Bowel Mgmt   - Continent  - pepcid    #/Bladder Mgmt   - Continent, PVR    #FEN   - Diet - Regular + Thins  [CCHO]  DASH/TLC    #- DVT PPX  : Lovenox 70mg Q12h    #Precautions / PROPHYLAXIS:   - Falls  - ortho: Weight bearing status: WBAT   - Lungs: Aspiration, Incentive Spirometer   - Pressure injury/Skin: OOB to Chair, PT/OT        MEDICAL PROGNOSIS: GOOD                                   REHAB POTENTIAL: GOOD  ESTIMATED DISPOSITION: HOME                             ELOS: 17-21 Days   EXPECTED THERAPY:     P.T. 1hr/day       O.T. 1hr/day      S.L.P. 1hr/day      EXP FREQUENCY: 5 days per 7 day period     PRESCREEN COMPARISON: I have reviewed the prescreen information and I have found no relevant changes between the preadmission screening and my post admission evaluation     RATIONALE FOR INPATIENT ADMISSION - Patient demonstrates the following: (check all that apply)  [X] Medically appropriate for rehabilitation admission  [X] Has attainable rehab goals with an appropriate initial discharge plan  [X] Has rehabilitation potential (expected to make a significant improvement within a reasonable period of time)   [X] Requires close medical managment by a rehab physician, rehab nursing care, Hospitalist and comprehensive interdisciplinary team (including PT, OT, & or SLP, Prosthetics and Orthotics)         [facility]                                        [facility] [place]                                 Chayito Jean, & Jose                                  [address] [place] [address]. [address]                                        [place], [place], [address]                                             [phone] ASSESSMENT/PLAN  SHIREEN CASH is a 70yo Female with HTN, HLD, T2DM, hx CVA, GERD, known RBBB with COVID-19 debility and Left frontal and parietla CVA with residual right HP, foot drop, patricio inattention aphasia    #Left frontal/parietal CVA  - Gait Instability, ADL impairments and Functional impairments: start Comprehensive Rehab Program of PT/OT/SLP  -neuropsychological evaluation  - cont ASA, lovenox therapeutic dose as may be hypercoagulable due to COVID.  - fall and aspiration precautions  - cont prozac for motor recovery  -possible right AFO for foot drop  -PRECAUTIONS: airborne, contact, aspiration, cardiac, fall    #Acute Hypoxic Respiratory Failure   # COVID-19 PNA  - Date of COVID testin20  - Hydroxychloroquine not given, pt with prolonged QTc  - EKG - QTc: 500 (), incomplete RBBB  - Vitamin C - out of stock in hospital pharmacy at this time  - Incentive spirometry, robitussin DM, ocean spray  -O2 via NC 1-2 l keep O2 sat > 94%  -hospitalist consult  -airborne and contact precautions    #HTN  - Lopressor 25 q12, amlodipine 5 qd, hydralazine 50mg qd  - monitor BP  hospitalist consult    #type II Mobitz block, known RBBB  - s/p Medtronic Micra AV+ leadless pacemaker  - outpt EP followup    #T2DM  - Lantus 25u qhs, humalog 6u TIDCC, SSI ACHS  - monitor fingersticks  -nutrition and hospitalist consults    #HLD  - cont simvastatin 20 qhs    #mood/sleep  - Prozac 10  - melatonin 3  -neuropsychology follow up. monitor closely given expressive deficits and awareness, frustration    #Pain Mgmt   - Tylenol PRN    #GI/Bowel Mgmt   - Continent  - pepcid    #/Bladder Mgmt   - Continent, PVR    #FEN   - Diet - Regular + Thins  [CCHO]  DASH/TLC    #- DVT PPX  : Lovenox 70mg Q12h    #Precautions / PROPHYLAXIS:   - Falls  - ortho: Weight bearing status: WBAT   - Lungs: Aspiration, Incentive Spirometer   - Pressure injury/Skin: OOB to Chair, PT/OT        MEDICAL PROGNOSIS: GOOD                                   REHAB POTENTIAL: GOOD  ESTIMATED DISPOSITION: HOME                             ELOS: 17-21 Days   EXPECTED THERAPY:     P.T. 1hr/day       O.T. 1hr/day      S.L.P. 1hr/day    May benefit from AFO  EXP FREQUENCY: 5 days per 7 day period     PRESCREEN COMPARISON: I have reviewed the prescreen information and I have found no relevant changes between the preadmission screening and my post admission evaluation     RATIONALE FOR INPATIENT ADMISSION - Patient demonstrates the following: (check all that apply)  [X] Medically appropriate for rehabilitation admission  [X] Has attainable rehab goals with an appropriate initial discharge plan  [X] Has rehabilitation potential (expected to make a significant improvement within a reasonable period of time)   [X] Requires close medical managment by a rehab physician, rehab nursing care, Hospitalist and comprehensive interdisciplinary team (including PT, OT, & or SLP, Prosthetics and Orthotics)         [facility]                                        Ne[facility] at [place]                                 Chayito Jean, & Jose                                  [address] [place] [address]. [address]                                        [place], [place], [address]                                             [phone]

## 2020-04-23 NOTE — PROGRESS NOTE ADULT - SUBJECTIVE AND OBJECTIVE BOX
Patient doing well today.  Notable motor in the LE this AM.    REVIEW OF SYSTEMS  Constitutional - No fever,  No fatigue  HEENT - No vertigo, No neck pain  Neurological - No headaches, No memory loss, +loss of strength, No numbness, No tremors  Skin - No rashes, No lesions   Musculoskeletal - No joint pain, No joint swelling, No muscle pain  Psychiatric - No depression, No anxiety    FUNCTIONAL PROGRESS  4/23  Upper Body Dressing Training  Upper Body Dressing Training Assistance: maximum assist (25% patient effort);  1 person assist;  nonverbal cues (demo/gestures);  verbal cues;  seated to don/doff gown. Patient educated on patricio dressing techniques but demonstrates no carryover. Difficulty with copying demonstrations even with max visual, verbal cues secondary to hemiparesis and cognitive impairments.;  abnormal muscle tone;  decreased strength;  impaired balance;  impaired coordination;  cognitive, decreased safety awareness;  decreased processing speed, sequencing, attention and problem solving abilities    4/22  Bed Mobility  Bed Mobility Training Sit-to-Supine: moderate assist (50% patient effort);  1 person assist;  verbal cues;  nonverbal cues (demo/gestures)  Bed Mobility Training Supine-to-Sit: moderate assist (50% patient effort);  1 person assist;  nonverbal cues (demo/gestures);  verbal cues  Bed Mobility Training Limitations: decreased ability to use legs for bridging/pushing;  decreased ability to use arms for pushing/pulling;  impaired ability to control trunk for mobility;  decreased strength;  impaired postural control;  impaired balance    Sit-Stand Transfer Training  Transfer Training Sit-to-Stand Transfer: maximum assist (25% patient effort);  verbal cues;  nonverbal cues (demo/gestures);  1 person assist;  PT blocking Right LE to prevent knee buckling  Transfer Training Stand-to-Sit Transfer: 1 person assist;  verbal cues;  nonverbal cues (demo/gestures);  moderate assist (50% patient effort);  PT blocking Right LE to prevent knee buckling    Gait Training  Gait Training: unable to perform;  Instructed patient to attempt taking a step with Left LE but pt demonstrated difficulty with weight shifting resulting in inability to take a step and pt was fatiguing and was ultimately safely transfered to sit down at edge of bed.       VITALS  T(C): 36.8 (04-23-20 @ 04:34), Max: 36.8 (04-22-20 @ 20:08)  HR: 73 (04-23-20 @ 04:34) (73 - 114)  BP: 150/72 (04-23-20 @ 04:34) (150/72 - 156/80)  RR: 16 (04-23-20 @ 04:34) (16 - 16)  SpO2: 100% (04-23-20 @ 04:34) (97% - 100%)  Wt(kg): --    MEDICATIONS   acetaminophen   Tablet .. 650 milliGRAM(s) every 6 hours PRN  amLODIPine   Tablet 5 milliGRAM(s) daily  aspirin enteric coated 81 milliGRAM(s) daily  benzonatate 100 milliGRAM(s) every 8 hours  dextrose 40% Gel 15 Gram(s) once PRN  dextrose 5%. 1000 milliLiter(s) <Continuous>  dextrose 50% Injectable 12.5 Gram(s) once  dextrose 50% Injectable 25 Gram(s) once  dextrose 50% Injectable 25 Gram(s) once  enoxaparin Injectable 70 milliGRAM(s) every 12 hours  famotidine    Tablet 20 milliGRAM(s) daily  FLUoxetine 10 milliGRAM(s) daily  glucagon  Injectable 1 milliGRAM(s) once PRN  hydrALAZINE 50 milliGRAM(s) daily  insulin glargine Injectable (LANTUS) 25 Unit(s) at bedtime  insulin lispro (HumaLOG) corrective regimen sliding scale   three times a day before meals  insulin lispro (HumaLOG) corrective regimen sliding scale   at bedtime  insulin lispro Injectable (HumaLOG) 6 Unit(s) three times a day before meals  melatonin 3 milliGRAM(s) at bedtime  metoprolol tartrate 25 milliGRAM(s) every 12 hours  simvastatin 20 milliGRAM(s) at bedtime  sodium chloride 0.65% Nasal 1 Spray(s) four times a day  sodium chloride 0.9% Bolus 250 milliLiter(s) once  sodium chloride 0.9%. 500 milliLiter(s) <Continuous>      RECENT LABS - Reviewed                    ----------------------------------------------------------------------------------------  PHYSICAL EXAM  Constitutional - NAD, Comfortable  Extremities - Right UE edema  Neurologic Exam -                    Cognitive - Awake, Alert, AAO to self, hospital, stroke     Communication - Expressive deficits, Slowed processing, Dysarthria     Cranial Nerves - Right facial droop     Motor - Right hemiparesis, Motor Apraxia                    LEFT    UE - ShAB 5/5, EF 5/5, EE 5/5, WE 5/5,  5/5                    RIGHT UE - ShAB 1/5, EF 1/5, EE 1/5,  1/5                    LEFT    LE - HF 5/5, KE 5/5, DF 5/5, PF 5/5                    RIGHT LE - HF 0/5, KE 0/5, DF 1/5, PF 0/5        Sensory - Intact to LT  Psychiatric - Mood stable, Affect Flat  ----------------------------------------------------------------------------------------  ASSESSMENT/PLAN  69yFemale with functional deficits after COVID related complications  COVID - TessalonNathaliex (hypercoag panel pending)  CVA - ASA, Zocor  Right hemiparesis/Mood - Prozac 10mg (4/22)  Sleep - Melatonin 3mg HS (4/21)  DM2 - Lantus, Humalog  HTN - Lopressor, Hydralazine, Norvasc  Pain - Tylenol  DVT PPX - SCDs, Lovneox  Rehab - Continue to recommend ACUTE inpatient rehabilitation for the functional deficits consisting of 3 hours of therapy/day & 24 hour RN/daily PMR physician for comorbid medical management. Will continue to follow for ongoing rehab needs and recommendations. Patient will be able to tolerate 3 hours a day.    Continue bedside therapy as well as OOB throughout the day with mobilization throughout the day with staff to maintain cardiopulmonary function and prevention of secondary complications related to debility.      At the time of this notation, as per administration at [facility], admission to Acute Inpatient Rehabilitation Facility of a COVID19+ patient must meet the following criteria:    1. Be at least 7 days post-first COVID + test  2. Be without FEVER or USE OF ANTIPYRETICS for  >72 hours  3. Be able to perform transfers or bed mobility with at most 50% assist (i.e. moderate assistance)  4. Be medically stable with resolution of primary symptoms and can tolerate 3 hours of therapy   5. Have a discharge plan from rehab is to home upon completion of AR    The above requirements may change and will be updated accordingly.     Discussed with rehab liaisons who confirmed that given her current functional status, she will need assist at home and family can provide.

## 2020-04-23 NOTE — PROGRESS NOTE ADULT - REASON FOR ADMISSION
Acute hypoxemic respiratory failure due to suspected COVID PNA

## 2020-04-23 NOTE — PROGRESS NOTE ADULT - SUBJECTIVE AND OBJECTIVE BOX
Pt remains stable.  Being transferred to inpatient rehab center.    oe:  cvs:  s1, s2         rs:  cta bilaterally         gi:  soft, non tender         ext:  no cyanosis, clubbing, edema         neuro - right hemiparesis    a/p:  cva - inpatient acute rehab          htn - controlled          hyperlipidemia - cont present mgmt

## 2020-04-23 NOTE — H&P ADULT - NSHPLABSRESULTS_GEN_ALL_CORE
RECENT LABS    Vital Signs Last 24 Hrs  T(C): 36.8 (23 Apr 2020 10:08), Max: 36.8 (22 Apr 2020 20:08)  T(F): 98.3 (23 Apr 2020 10:08), Max: 98.3 (23 Apr 2020 10:08)  HR: 88 (23 Apr 2020 10:08) (73 - 114)  BP: 145/56 (23 Apr 2020 10:08) (145/56 - 156/80)  BP(mean): --  RR: 17 (23 Apr 2020 10:08) (16 - 17)  SpO2: 92% (23 Apr 2020 10:08) (92% - 100%)      CAPILLARY BLOOD GLUCOSE      POCT Blood Glucose.: 229 mg/dL (23 Apr 2020 12:34)  POCT Blood Glucose.: 260 mg/dL (23 Apr 2020 08:06)  POCT Blood Glucose.: 249 mg/dL (22 Apr 2020 20:18)  POCT Blood Glucose.: 271 mg/dL (22 Apr 2020 17:14)         EXAM:  CT BRAIN STROKE PROTOCOL                          PROCEDURE DATE:  04/16/2020          INTERPRETATION:  CLINICAL INFORMATION: aphasia, right le weakness. covid positive, s/p pacemaker placement. ADMDIAG1: R09.02 +COVID SOB/.    TECHNIQUE: Sequential axial images were obtained from the vertex to the skull base without intravenous contrast. Coronal and sagittal reformations were obtained.     COMPARISON: Prior CT dated 8/28/2018.     FINDINGS:    Areas of loss of gray-white differentiation in the left frontal and parietal lobes suspicious for acute infarcts. No evidence of hemorrhagic transformation. No midline shift, hydrocephalus, or effacement of basal cisterns. Chronic right cerebellar infarcts. There are areas of hypodensity in the bilateral hemispheric white matter suggesting chronic white matter microvascular ischemic change. There is cerebral volume loss.    Fluid attenuation extra-axial lesion along the left frontal temporal convexity suggesting an arachnoid cyst, unchanged.    There is no displaced calvarial fracture. The visualized orbits are within normal limits. The visualized portions of the paranasal sinuses are well aerated. The mastoid air cells are well aerated.      IMPRESSION: Areas of loss of gray-white differentiation in the left frontal and parietal lobes suspicious for acute infarcts. No evidence of hemorrhagic transformation.    These findings were discussed with Dr. Tucker at 4/16/2020 11:40 AM by Dr. Josef Rodas with read back confirmation.      JOSEF RODAS   This document has been electronically signed. Apr 16 2020 12:59PM

## 2020-04-23 NOTE — H&P ADULT - CONSTITUTIONAL COMMENTS
Eyes open spontaneously . O x 3 when choosing from field ofr 2-3 (cannot spontaneously answer, but chooses correctly) Aware of word finding deficits and shows visible frustration at times. No respiratory distress during exam

## 2020-04-23 NOTE — H&P ADULT - HISTORY OF PRESENT ILLNESS
SHIREEN CASH is a 70yo female with PMH of HTN, HLD, T2DM, hx CVA, GERD, known RBBB presented to [facility] ED on 4/8/20 with complaints of fever, chills, cough, chest pain, SOB for 5 days. Per patient, her  tested +COVID at an outpatient testing facility a few days prior, patient herself had tested negative at the time. They continued to share the same living space. ED workup showed +COVID and Mobitz type II block. EP was consulted and patient is s/p Medtronic Micra AV+ leadless pacemaker implantation via right femoral vein on 4/10/20.    On 4/16/20, patient developed acute aphasia and right sided weakness. CT head showed an acute LEFT frontal/parietal CVA. CTA head/neck showed LEFT ICA stenosis but no occlusion. tPA was not administered due to timing. SHIREEN CASH is a 70yo female with PMH of HTN, HLD, T2DM, hx CVA, GERD, known RBBB presented to [facility] ED on 4/8/20 with complaints of fever, chills, cough, chest pain, SOB for 5 days. Per patient, her  tested +COVID at an outpatient testing facility a few days prior, patient herself had tested negative at the time. They continued to share the same living space. ED workup showed +COVID and Mobitz type II block. EP was consulted and patient is s/p Medtronic Micra AV+ leadless pacemaker implantation via right femoral vein on 4/10/20. Hydroxychloroquine presumably not given due to prolonged QTc.     On 4/16/20, patient developed acute aphasia and right sided weakness. CT head showed an acute LEFT frontal/parietal CVA. CTA head/neck showed LEFT ICA stenosis but no occlusion. tPA was not administered due to timing. SHIREEN CASH is a 70yo female with PMH of HTN, HLD, T2DM, hx CVA, GERD, known RBBB presented to [facility] ED on 4/8/20 with complaints of fever, chills, cough, chest pain, SOB for 5 days. Per patient, her  tested +COVID at an outpatient testing facility a few days prior, patient herself had tested negative at the time. They continued to share the same living space. ED workup showed +COVID and Mobitz type II block. EP was consulted and patient is s/p Medtronic Micra AV+ leadless pacemaker implantation via right femoral vein on 4/10/20. Hydroxychloroquine presumably not given due to prolonged QTc.     On 4/16/20, patient developed acute aphasia and right sided weakness. CT head showed an acute LEFT frontal/parietal CVA. CTA head/neck showed LEFT ICA stenosis but no occlusion. tPA was not administered due to timing. Placed on full dose lovenox as thoguht to be hypercoagulable secondary to COVID. Patient was cleared for regular consistency solids, thin liquids. SHIREEN CASH is a 68yo female RH dominant with PMH of HTN, HLD, T2DM, hx CVA, GERD, known RBBB presented to [facility] ED on 4/8/20 with complaints of fever, chills, cough, chest pain, SOB for 5 days. Per patient, her  tested +COVID at an outpatient testing facility a few days prior, patient herself had tested negative at the time. They continued to share the same living space. ED workup showed +COVID and Mobitz type II block. EP was consulted and patient is s/p Medtronic Micra AV+ leadless pacemaker implantation via right femoral vein on 4/10/20. Hydroxychloroquine presumably not given due to prolonged QTc.     On 4/16/20, patient developed acute aphasia and right sided weakness. CT head showed an acute LEFT frontal/parietal CVA. CTA head/neck showed LEFT ICA stenosis but no occlusion. tPA was not administered due to timing and no penumbra. Placed on full dose lovenox as thoguht to be hypercoagulable secondary to COVID. Patient was cleared for regular consistency solids, thin liquids. Transferred to [place] for acute inpatient rehab services 4/23/20.

## 2020-04-23 NOTE — H&P ADULT - EXTREMITIES COMMENTS
RUE hypotonic. No grimacing with PROM shoulder elevation 2/5  elbow flexion 3-/5 gross grasp 3/5  left UE normal tone and ROM no tremors  motor 5/5 shoulder, elbow flexion and extension, gross grasp  no calf swelling or pedal edema. no redness or skin breakdown in heels  tightness right heel cord  withdraws RLE to painful stimulation, difficulty with AROM  2/5 hip flexion and hamstring

## 2020-04-23 NOTE — DISCHARGE NOTE NURSING/CASE MANAGEMENT/SOCIAL WORK - PATIENT PORTAL LINK FT
You can access the FollowMyHealth Patient Portal offered by [facility] by registering at the following website: [email or web address]. By joining [employer]’s FollowMyHealth portal, you will also be able to view your health information using other applications (apps) compatible with our system.

## 2020-04-24 LAB
ALBUMIN SERPL ELPH-MCNC: 3.1 G/DL — LOW (ref 3.3–5)
ALP SERPL-CCNC: 94 U/L — SIGNIFICANT CHANGE UP (ref 40–120)
ALT FLD-CCNC: 27 U/L — SIGNIFICANT CHANGE UP (ref 10–45)
ANION GAP SERPL CALC-SCNC: 7 MMOL/L — SIGNIFICANT CHANGE UP (ref 5–17)
AST SERPL-CCNC: 29 U/L — SIGNIFICANT CHANGE UP (ref 10–40)
BASOPHILS # BLD AUTO: 0.25 K/UL — HIGH (ref 0–0.2)
BASOPHILS NFR BLD AUTO: 2 % — SIGNIFICANT CHANGE UP (ref 0–2)
BILIRUB SERPL-MCNC: 0.5 MG/DL — SIGNIFICANT CHANGE UP (ref 0.2–1.2)
BUN SERPL-MCNC: 16 MG/DL — SIGNIFICANT CHANGE UP (ref 7–23)
CALCIUM SERPL-MCNC: 9.1 MG/DL — SIGNIFICANT CHANGE UP (ref 8.4–10.5)
CHLORIDE SERPL-SCNC: 100 MMOL/L — SIGNIFICANT CHANGE UP (ref 96–108)
CO2 SERPL-SCNC: 30 MMOL/L — SIGNIFICANT CHANGE UP (ref 22–31)
CREAT SERPL-MCNC: 0.76 MG/DL — SIGNIFICANT CHANGE UP (ref 0.5–1.3)
CRP SERPL-MCNC: 0.6 MG/DL — HIGH (ref 0–0.4)
D DIMER BLD IA.RAPID-MCNC: 1699 NG/ML DDU — HIGH
EOSINOPHIL # BLD AUTO: 0 K/UL — SIGNIFICANT CHANGE UP (ref 0–0.5)
EOSINOPHIL NFR BLD AUTO: 0 % — SIGNIFICANT CHANGE UP (ref 0–6)
FERRITIN SERPL-MCNC: 374 NG/ML — HIGH (ref 15–150)
GLUCOSE SERPL-MCNC: 301 MG/DL — HIGH (ref 70–99)
HCT VFR BLD CALC: 37.8 % — SIGNIFICANT CHANGE UP (ref 34.5–45)
HGB BLD-MCNC: 12.5 G/DL — SIGNIFICANT CHANGE UP (ref 11.5–15.5)
LYMPHOCYTES # BLD AUTO: 17 % — SIGNIFICANT CHANGE UP (ref 13–44)
LYMPHOCYTES # BLD AUTO: 2.1 K/UL — SIGNIFICANT CHANGE UP (ref 1–3.3)
MCHC RBC-ENTMCNC: 28.8 PG — SIGNIFICANT CHANGE UP (ref 27–34)
MCHC RBC-ENTMCNC: 33.1 GM/DL — SIGNIFICANT CHANGE UP (ref 32–36)
MCV RBC AUTO: 87.1 FL — SIGNIFICANT CHANGE UP (ref 80–100)
MONOCYTES # BLD AUTO: 1.24 K/UL — HIGH (ref 0–0.9)
MONOCYTES NFR BLD AUTO: 10 % — SIGNIFICANT CHANGE UP (ref 2–14)
NEUTROPHILS # BLD AUTO: 7.9 K/UL — HIGH (ref 1.8–7.4)
NEUTROPHILS NFR BLD AUTO: 64 % — SIGNIFICANT CHANGE UP (ref 43–77)
PLATELET # BLD AUTO: 498 K/UL — HIGH (ref 150–400)
POTASSIUM SERPL-MCNC: 4.3 MMOL/L — SIGNIFICANT CHANGE UP (ref 3.5–5.3)
POTASSIUM SERPL-SCNC: 4.3 MMOL/L — SIGNIFICANT CHANGE UP (ref 3.5–5.3)
PROT SERPL-MCNC: 7.5 G/DL — SIGNIFICANT CHANGE UP (ref 6–8.3)
RBC # BLD: 4.34 M/UL — SIGNIFICANT CHANGE UP (ref 3.8–5.2)
RBC # FLD: 14.5 % — SIGNIFICANT CHANGE UP (ref 10.3–14.5)
SODIUM SERPL-SCNC: 137 MMOL/L — SIGNIFICANT CHANGE UP (ref 135–145)
WBC # BLD: 12.35 K/UL — HIGH (ref 3.8–10.5)
WBC # FLD AUTO: 12.35 K/UL — HIGH (ref 3.8–10.5)

## 2020-04-24 PROCEDURE — 99233 SBSQ HOSP IP/OBS HIGH 50: CPT

## 2020-04-24 PROCEDURE — 99232 SBSQ HOSP IP/OBS MODERATE 35: CPT

## 2020-04-24 RX ADMIN — Medication 3: at 16:57

## 2020-04-24 RX ADMIN — FAMOTIDINE 20 MILLIGRAM(S): 10 INJECTION INTRAVENOUS at 12:30

## 2020-04-24 RX ADMIN — INSULIN GLARGINE 25 UNIT(S): 100 INJECTION, SOLUTION SUBCUTANEOUS at 22:57

## 2020-04-24 RX ADMIN — Medication 25 MILLIGRAM(S): at 17:13

## 2020-04-24 RX ADMIN — Medication 100 MILLIGRAM(S): at 12:30

## 2020-04-24 RX ADMIN — ENOXAPARIN SODIUM 70 MILLIGRAM(S): 100 INJECTION SUBCUTANEOUS at 05:19

## 2020-04-24 RX ADMIN — Medication 6 UNIT(S): at 16:57

## 2020-04-24 RX ADMIN — Medication 6 UNIT(S): at 09:17

## 2020-04-24 RX ADMIN — Medication 4: at 12:20

## 2020-04-24 RX ADMIN — ENOXAPARIN SODIUM 70 MILLIGRAM(S): 100 INJECTION SUBCUTANEOUS at 17:14

## 2020-04-24 RX ADMIN — Medication 2: at 09:17

## 2020-04-24 RX ADMIN — Medication 81 MILLIGRAM(S): at 12:30

## 2020-04-24 RX ADMIN — Medication 25 MILLIGRAM(S): at 05:20

## 2020-04-24 RX ADMIN — AMLODIPINE BESYLATE 5 MILLIGRAM(S): 2.5 TABLET ORAL at 05:18

## 2020-04-24 RX ADMIN — ATORVASTATIN CALCIUM 10 MILLIGRAM(S): 80 TABLET, FILM COATED ORAL at 22:56

## 2020-04-24 RX ADMIN — Medication 6 UNIT(S): at 12:20

## 2020-04-24 RX ADMIN — Medication 100 MILLIGRAM(S): at 05:20

## 2020-04-24 RX ADMIN — Medication 3 MILLIGRAM(S): at 00:02

## 2020-04-24 RX ADMIN — Medication 1 SPRAY(S): at 23:36

## 2020-04-24 RX ADMIN — Medication 1 SPRAY(S): at 12:29

## 2020-04-24 RX ADMIN — Medication 3 MILLIGRAM(S): at 22:58

## 2020-04-24 RX ADMIN — Medication 50 MILLIGRAM(S): at 05:17

## 2020-04-24 RX ADMIN — Medication 10 MILLIGRAM(S): at 12:30

## 2020-04-24 RX ADMIN — Medication 1 SPRAY(S): at 17:13

## 2020-04-24 RX ADMIN — Medication 1 SPRAY(S): at 07:05

## 2020-04-24 RX ADMIN — Medication 100 MILLIGRAM(S): at 22:56

## 2020-04-24 NOTE — PROGRESS NOTE ADULT - SUBJECTIVE AND OBJECTIVE BOX
seen and examined at bedside    no co     no fc   ros all others are neg     Vital Signs Last 24 Hrs    T(C): 37.2 (24 Apr 2020 11:00), Max: 37.6 (23 Apr 2020 16:04)  T(F): 99 (24 Apr 2020 11:00), Max: 99.6 (23 Apr 2020 16:04)  HR: 100 (24 Apr 2020 11:00) (89 - 101)  BP: 119/70 (24 Apr 2020 11:00) (119/70 - 155/76)  BP(mean): --  RR: 16 (24 Apr 2020 11:00) (16 - 16)  SpO2: 100% (24 Apr 2020 11:00) (97% - 100%)    MEDICATIONS  (STANDING):    amLODIPine   Tablet 5 milliGRAM(s) Oral daily  aspirin enteric coated 81 milliGRAM(s) Oral daily  atorvastatin 10 milliGRAM(s) Oral at bedtime  benzonatate 100 milliGRAM(s) Oral every 8 hours  dextrose 5%. 1000 milliLiter(s) (50 mL/Hr) IV Continuous <Continuous>  dextrose 50% Injectable 12.5 Gram(s) IV Push once  dextrose 50% Injectable 25 Gram(s) IV Push once  dextrose 50% Injectable 25 Gram(s) IV Push once  enoxaparin Injectable 70 milliGRAM(s) SubCutaneous every 12 hours  famotidine    Tablet 20 milliGRAM(s) Oral daily  FLUoxetine 10 milliGRAM(s) Oral daily  hydrALAZINE 50 milliGRAM(s) Oral daily  insulin glargine Injectable (LANTUS) 25 Unit(s) SubCutaneous at bedtime  insulin lispro (HumaLOG) corrective regimen sliding scale   SubCutaneous three times a day before meals  insulin lispro (HumaLOG) corrective regimen sliding scale   SubCutaneous at bedtime  insulin lispro Injectable (HumaLOG) 6 Unit(s) SubCutaneous three times a day before meals  melatonin 3 milliGRAM(s) Oral at bedtime  metoprolol tartrate 25 milliGRAM(s) Oral every 12 hours  sodium chloride 0.65% Nasal 1 Spray(s) Both Nostrils four times a day                          12.5   12.35 )-----------( 498      ( 24 Apr 2020 09:15 )             37.8     04-24    137  |  100  |  16  ----------------------------<  301<H>  4.3   |  30  |  0.76    Ca    9.1      24 Apr 2020 09:15    TPro  7.5  /  Alb  3.1<L>  /  TBili  0.5  /  DBili  x   /  AST  29  /  ALT  27  /  AlkPhos  94  04-24

## 2020-04-24 NOTE — PROGRESS NOTE ADULT - COMMENTS
Patient seen with assistance language line  in [place]. Patient doing well, comfortable. +has persistsent dry cough but no respiratory distress. klarissa H/A, dizziness, chest tightness, palpitations, N/V. continues ot be aphasic but also alert, increased spontaneous movement RUE

## 2020-04-24 NOTE — PROGRESS NOTE ADULT - EXTREMITIES COMMENTS
RUE hypotonic . shoulder elevation 1/5  finger flexion 3/5 elbow flexion 2/5  0/5 active ROM RLE  no calf swelling +soft, NT  +reduced sensation LT right UE and LE

## 2020-04-24 NOTE — PROGRESS NOTE ADULT - SUBJECTIVE AND OBJECTIVE BOX
Patient is a 69y old  Female who presents with a chief complaint of left frontal parietal CVA with right HP and aphasia, COVID+ 4/8/20 (23 Apr 2020 14:26)      HPI:  SHIREEN CASH is a 68yo female RH dominant with PMH of HTN, HLD, T2DM, hx CVA, GERD, known RBBB presented to [facility] ED on 4/8/20 with complaints of fever, chills, cough, chest pain, SOB for 5 days. Per patient, her  tested +COVID at an outpatient testing facility a few days prior, patient herself had tested negative at the time. They continued to share the same living space. ED workup showed +COVID and Mobitz type II block. EP was consulted and patient is s/p Medtronic Micra AV+ leadless pacemaker implantation via right femoral vein on 4/10/20. Hydroxychloroquine presumably not given due to prolonged QTc.     On 4/16/20, patient developed acute aphasia and right sided weakness. CT head showed an acute LEFT frontal/parietal CVA. CTA head/neck showed LEFT ICA stenosis but no occlusion. tPA was not administered due to timing and no penumbra. Placed on full dose lovenox as thoguht to be hypercoagulable secondary to COVID. Patient was cleared for regular consistency solids, thin liquids. Transferred to [place] for acute inpatient rehab services 4/23/20. (23 Apr 2020 14:26)      PAST MEDICAL & SURGICAL HISTORY:  H/O gastroesophageal reflux (GERD)  HTN (hypertension)  DM (diabetes mellitus)  History of benign brain tumor      MEDICATIONS  (STANDING):  amLODIPine   Tablet 5 milliGRAM(s) Oral daily  aspirin enteric coated 81 milliGRAM(s) Oral daily  atorvastatin 10 milliGRAM(s) Oral at bedtime  benzonatate 100 milliGRAM(s) Oral every 8 hours  dextrose 5%. 1000 milliLiter(s) (50 mL/Hr) IV Continuous <Continuous>  dextrose 50% Injectable 12.5 Gram(s) IV Push once  dextrose 50% Injectable 25 Gram(s) IV Push once  dextrose 50% Injectable 25 Gram(s) IV Push once  enoxaparin Injectable 70 milliGRAM(s) SubCutaneous every 12 hours  famotidine    Tablet 20 milliGRAM(s) Oral daily  FLUoxetine 10 milliGRAM(s) Oral daily  hydrALAZINE 50 milliGRAM(s) Oral daily  insulin glargine Injectable (LANTUS) 25 Unit(s) SubCutaneous at bedtime  insulin lispro (HumaLOG) corrective regimen sliding scale   SubCutaneous three times a day before meals  insulin lispro (HumaLOG) corrective regimen sliding scale   SubCutaneous at bedtime  insulin lispro Injectable (HumaLOG) 6 Unit(s) SubCutaneous three times a day before meals  melatonin 3 milliGRAM(s) Oral at bedtime  metoprolol tartrate 25 milliGRAM(s) Oral every 12 hours  sodium chloride 0.65% Nasal 1 Spray(s) Both Nostrils four times a day    MEDICATIONS  (PRN):  acetaminophen   Tablet .. 650 milliGRAM(s) Oral every 6 hours PRN Temp greater or equal to 38C (100.4F), Mild Pain (1 - 3), Moderate Pain (4 - 6)  dextrose 40% Gel 15 Gram(s) Oral once PRN Blood Glucose LESS THAN 70 milliGRAM(s)/deciliter  glucagon  Injectable 1 milliGRAM(s) IntraMuscular once PRN Glucose LESS THAN 70 milligrams/deciliter      Allergies    No Known Allergies    Intolerances          VITALS  69y  Vital Signs Last 24 Hrs  T(C): 37.2 (24 Apr 2020 11:00), Max: 37.6 (23 Apr 2020 16:04)  T(F): 99 (24 Apr 2020 11:00), Max: 99.6 (23 Apr 2020 16:04)  HR: 100 (24 Apr 2020 11:00) (89 - 101)  BP: 119/70 (24 Apr 2020 11:00) (119/70 - 155/76)  BP(mean): --  RR: 16 (24 Apr 2020 11:00) (16 - 16)  SpO2: 100% (24 Apr 2020 11:00) (97% - 100%)  Daily Height in cm: 160.02 (23 Apr 2020 23:08)    Daily         RECENT LABS:                          12.5   12.35 )-----------( 498      ( 24 Apr 2020 09:15 )             37.8     04-24    137  |  100  |  16  ----------------------------<  301<H>  4.3   |  30  |  0.76    Ca    9.1      24 Apr 2020 09:15    TPro  7.5  /  Alb  3.1<L>  /  TBili  0.5  /  DBili  x   /  AST  29  /  ALT  27  /  AlkPhos  94  04-24    LIVER FUNCTIONS - ( 24 Apr 2020 09:15 )  Alb: 3.1 g/dL / Pro: 7.5 g/dL / ALK PHOS: 94 U/L / ALT: 27 U/L / AST: 29 U/L / GGT: x                   CAPILLARY BLOOD GLUCOSE      POCT Blood Glucose.: 211 mg/dL (24 Apr 2020 08:24)  POCT Blood Glucose.: 245 mg/dL (23 Apr 2020 23:12)  POCT Blood Glucose.: 259 mg/dL (23 Apr 2020 16:48)  POCT Blood Glucose.: 229 mg/dL (23 Apr 2020 12:34)

## 2020-04-24 NOTE — PROGRESS NOTE ADULT - ASSESSMENT
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  - cont prozac for motor recovery    #Acute Hypoxic Respiratory Failure   # COVID-19 PNA    #Essential HTN controlled    #type II Mobitz block, known RBBB  - s/p Medtronic Micra AV+ leadless pacemaker  - outpt EP followup    #T2DM controlled  - Lantus 25u qhs, humalog 6u TIDCC, SSI ACHS  - monitor fingersticks  -nutrition and hospitalist consults    #HLD  - cont simvastatin 20 qhs    #mood/sleep controlled  - Prozac 10  - melatonin 3  -neuropsychology follow up. monitor closely given expressive deficits and awareness, frustration      reviewed w patient plan of care

## 2020-04-24 NOTE — PROGRESS NOTE ADULT - ASSESSMENT
ASSESSMENT/PLAN  SHIREEN CASH is a 68yo Female with HTN, HLD, T2DM, hx CVA, GERD, known RBBB with COVID-19 debility and Left frontal and parietla CVA with residual right HP, foot drop, patricio inattention aphasia    #Left frontal/parietal CVA  - continue  Comprehensive Rehab Program of PT/OT/SLP  -neuropsychological evaluation  - cont ASA, lovenox therapeutic dose as may be hypercoagulable due to COVID.  - fall and aspiration precautions  - cont prozac for motor recovery  -right SAFO evalution with liner  -PRECAUTIONS: airborne, contact, aspiration, cardiac, fall    #Acute Hypoxic Respiratory Failure secondary to COVID-19 PNA  Date of COVID testin20  - Hydroxychloroquine not given, pt with prolonged QTc  - EKG - QTc: 500 (), incomplete RBBB  - Incentive spirometry, robitussin DM, ocean spray  -O2 via NC 1-2 l keep O2 sat > 94%  -hospitalist consult  -airborne and contact precautions    #labs:  leukocytosis WBC 12.35 (improved from 14.25). Low grade fever 99.6  -continue to monitor, clinically stable  -CBC ,     #HTN  - Lopressor 25 q12, amlodipine 5 qd, hydralazine 50mg qd  - monitor BP  hospitalist consult    #type II Mobitz block, known RBBB  - s/p Medtronic Micra AV+ leadless pacemaker  - outpt EP followup    #T2DM  - Lantus 25u qhs, humalog 6u TIDCC, SSI ACHS  - monitor fingersticks  -nutrition and hospitalist consults    #HLD  - cont simvastatin 20 qhs    #mood/sleep  - Prozac 10  - melatonin 3  -neuropsychology follow up. monitor closely given expressive deficits and awareness, frustration    #Pain Mgmt   - Tylenol PRN    #GI/Bowel Mgmt   - Continent  - pepcid    #/Bladder Mgmt   - Continent, PVR    #FEN   - Diet - Regular + Thins  [CCHO]  DASH/TLC    #- DVT PPX  : Lovenox 70mg Q12h    LABS  CBC   CBc BMp

## 2020-04-25 LAB
HCT VFR BLD CALC: 34.3 % — LOW (ref 34.5–45)
HGB BLD-MCNC: 11.4 G/DL — LOW (ref 11.5–15.5)
IL6 FLD-MCNC: 6.5 PG/ML — SIGNIFICANT CHANGE UP (ref 0–15.5)
MCHC RBC-ENTMCNC: 28.6 PG — SIGNIFICANT CHANGE UP (ref 27–34)
MCHC RBC-ENTMCNC: 33.2 GM/DL — SIGNIFICANT CHANGE UP (ref 32–36)
MCV RBC AUTO: 86 FL — SIGNIFICANT CHANGE UP (ref 80–100)
NRBC # BLD: 0 /100 WBCS — SIGNIFICANT CHANGE UP (ref 0–0)
PLATELET # BLD AUTO: 456 K/UL — HIGH (ref 150–400)
RBC # BLD: 3.99 M/UL — SIGNIFICANT CHANGE UP (ref 3.8–5.2)
RBC # FLD: 14.5 % — SIGNIFICANT CHANGE UP (ref 10.3–14.5)
WBC # BLD: 12.08 K/UL — HIGH (ref 3.8–10.5)
WBC # FLD AUTO: 12.08 K/UL — HIGH (ref 3.8–10.5)

## 2020-04-25 PROCEDURE — 99233 SBSQ HOSP IP/OBS HIGH 50: CPT

## 2020-04-25 PROCEDURE — 99232 SBSQ HOSP IP/OBS MODERATE 35: CPT | Mod: GC

## 2020-04-25 RX ADMIN — Medication 100 MILLIGRAM(S): at 23:08

## 2020-04-25 RX ADMIN — Medication 6 UNIT(S): at 08:16

## 2020-04-25 RX ADMIN — FAMOTIDINE 20 MILLIGRAM(S): 10 INJECTION INTRAVENOUS at 12:12

## 2020-04-25 RX ADMIN — Medication 2: at 08:15

## 2020-04-25 RX ADMIN — Medication 650 MILLIGRAM(S): at 12:19

## 2020-04-25 RX ADMIN — Medication 50 MILLIGRAM(S): at 06:01

## 2020-04-25 RX ADMIN — Medication 25 MILLIGRAM(S): at 17:07

## 2020-04-25 RX ADMIN — Medication 4: at 17:06

## 2020-04-25 RX ADMIN — ENOXAPARIN SODIUM 70 MILLIGRAM(S): 100 INJECTION SUBCUTANEOUS at 06:01

## 2020-04-25 RX ADMIN — Medication 100 MILLIGRAM(S): at 15:25

## 2020-04-25 RX ADMIN — Medication 1 SPRAY(S): at 17:07

## 2020-04-25 RX ADMIN — Medication 10 MILLIGRAM(S): at 12:12

## 2020-04-25 RX ADMIN — INSULIN GLARGINE 25 UNIT(S): 100 INJECTION, SOLUTION SUBCUTANEOUS at 23:09

## 2020-04-25 RX ADMIN — Medication 3 MILLIGRAM(S): at 23:10

## 2020-04-25 RX ADMIN — Medication 1 SPRAY(S): at 06:02

## 2020-04-25 RX ADMIN — ENOXAPARIN SODIUM 70 MILLIGRAM(S): 100 INJECTION SUBCUTANEOUS at 17:06

## 2020-04-25 RX ADMIN — Medication 100 MILLIGRAM(S): at 06:01

## 2020-04-25 RX ADMIN — AMLODIPINE BESYLATE 5 MILLIGRAM(S): 2.5 TABLET ORAL at 06:01

## 2020-04-25 RX ADMIN — Medication 6 UNIT(S): at 12:13

## 2020-04-25 RX ADMIN — Medication 6 UNIT(S): at 17:06

## 2020-04-25 RX ADMIN — Medication 1 SPRAY(S): at 12:18

## 2020-04-25 RX ADMIN — Medication 3: at 12:13

## 2020-04-25 RX ADMIN — ATORVASTATIN CALCIUM 10 MILLIGRAM(S): 80 TABLET, FILM COATED ORAL at 23:08

## 2020-04-25 RX ADMIN — Medication 81 MILLIGRAM(S): at 12:12

## 2020-04-25 RX ADMIN — Medication 25 MILLIGRAM(S): at 06:01

## 2020-04-25 NOTE — CHART NOTE - NSCHARTNOTEFT_GEN_A_CORE
Nutrition Initial Assessment    Nutrition Consult Received:  No [ x ]    Reason for Initial Nutrition Assessment: Low PO intake    Source of Information: Unable to conduct a fact to face interview due to limited contact restrictions related to pt's medical condition and isolation precautions. Information obtained from a phone call with the pt and from the EMR.    Admitting Diagnosis: 69y Female admitted for [facility] ED on 4/8/20 with complaints of fever, chills, cough, chest pain, SOB for 5 days. Tested +COVID. Mobitz type II block. EP was consulted and patient is s/p Medtronic Micra AV+ leadless pacemaker implantation via right femoral vein on 4/10/20. Hydroxychloroquine presumably not given due to prolonged QTc.   On 4/16/20, patient developed acute aphasia and right sided weakness. CT head showed an acute LEFT frontal/parietal CVA.  Patient was cleared for regular consistency solids, thin liquids. Transferred to [place] for acute inpatient rehab services 4/23/20.     PAST MEDICAL & SURGICAL HISTORY:  H/O gastroesophageal reflux (GERD)  HTN (hypertension)  DM (diabetes mellitus)  History of benign brain tumor    Subjective Information:   Pt interview via phone in English.  Pt had difficulty to speak secondary to CVA. Could say simple single words. Could not say her UBW. Sounds confused. Pt reports consuming 50% of her meals. Could not verbalize if has chewing/swallowing difficulties, seems tried to tell something. Pt expressed feeling weak.  Pt denies N/V/D/C. Last BM: 4/22.     Current Nutrition Order:  PO Intake:   Poor (<50%) [x]    Skin Integrity: wound blister unspecified site.     Labs:   04-24 Na137 mmol/L Glu 301 mg/dL<H> K+ 4.3 mmol/L Cr  0.76 mg/dL BUN 16 mg/dL Phos n/a   Alb 3.1 g/dL<L> PAB n/a       POCT Blood Glucose.: 228 mg/dL (04-25-20 @ 07:43)  POCT Blood Glucose.: 202 mg/dL (04-24-20 @ 22:40)  POCT Blood Glucose.: 297 mg/dL (04-24-20 @ 16:16)  POCT Blood Glucose.: 324 mg/dL (04-24-20 @ 11:21)  POCT Blood Glucose.: 347 mg/dL (04-24-20 @ 11:20)    Medications:  MEDICATIONS  (STANDING):  amLODIPine   Tablet 5 milliGRAM(s) Oral daily  aspirin enteric coated 81 milliGRAM(s) Oral daily  atorvastatin 10 milliGRAM(s) Oral at bedtime  benzonatate 100 milliGRAM(s) Oral every 8 hours  dextrose 5%. 1000 milliLiter(s) (50 mL/Hr) IV Continuous <Continuous>  dextrose 50% Injectable 12.5 Gram(s) IV Push once  dextrose 50% Injectable 25 Gram(s) IV Push once  dextrose 50% Injectable 25 Gram(s) IV Push once  enoxaparin Injectable 70 milliGRAM(s) SubCutaneous every 12 hours  famotidine    Tablet 20 milliGRAM(s) Oral daily  FLUoxetine 10 milliGRAM(s) Oral daily  hydrALAZINE 50 milliGRAM(s) Oral daily  insulin glargine Injectable (LANTUS) 25 Unit(s) SubCutaneous at bedtime  insulin lispro (HumaLOG) corrective regimen sliding scale   SubCutaneous three times a day before meals  insulin lispro (HumaLOG) corrective regimen sliding scale   SubCutaneous at bedtime  insulin lispro Injectable (HumaLOG) 6 Unit(s) SubCutaneous three times a day before meals  melatonin 3 milliGRAM(s) Oral at bedtime  metoprolol tartrate 25 milliGRAM(s) Oral every 12 hours  sodium chloride 0.65% Nasal 1 Spray(s) Both Nostrils four times a day    MEDICATIONS  (PRN):  acetaminophen   Tablet .. 650 milliGRAM(s) Oral every 6 hours PRN Temp greater or equal to 38C (100.4F), Mild Pain (1 - 3), Moderate Pain (4 - 6)  dextrose 40% Gel 15 Gram(s) Oral once PRN Blood Glucose LESS THAN 70 milliGRAM(s)/deciliter  glucagon  Injectable 1 milliGRAM(s) IntraMuscular once PRN Glucose LESS THAN 70 milligrams/deciliter    Admitted Anthropometrics:    Height (cm): 160 (04-23-20 @ 23:08)  Weight (kg): 75.1 (04-25-20 @ 04:55)  BMI (kg/m2): 29.3 (04-25-20 @ 04:55)    Nutrition Focused Physical Exam: Unable to complete due to limited isolation contact precautions at this time.     Estimated Energy Needs (_25_ kcal/kg- _30__ kcal/kg): 7441-1247 calories  Estimated Protein Needs (_1.0_ g/kg- 1.2 g/kg): 75-90 gm  Based on weight of: 75kg     [x ] Nutrition Diagnosis:    Inadequate intake related to acute illness, CVA, +coviv19 symptoms, possible dysphagia, AEB pt expressed feeling weak, PO intake 50% or <.     Goal:  - Pt will be able to consume adequate food consistency and meet at least 75% of the estimated nutritional needs during hospitalization.    Nutrition Interventions: Diet, Supplements, Education, referral to SLP    Recommendations:    [x] Continue w/ Consistent Carbohydrates diet (w/ evening snack), DASH/TLC  w/ the consistency recommended by SLP.  [x] Add Glucerna shake 2 x day (will provide 200 missy, 10 g protein per can of 8 oz)   [x] Honor pt's food preferences as feasible with prescribed diet.     [x] Will continue monitor food and fluids intake, wts, GI symptoms, labs, skin integrity.     RD to follow-up per protocol.

## 2020-04-25 NOTE — PROGRESS NOTE ADULT - ASSESSMENT
69 F essential HTN, HLD, T2DM, hx CVA, GERD, known RBBB with COVID-19 PNA   Left frontal and parietal CVA with residual right HP    #Left frontal/parietal CVA  - Gait Instability, ADL impairments and Functional impairments: start Comprehensive Rehab Program of PT/OT/SLP  -neuropsychological evaluation  - cont ASA, lovenox therapeutic dose as may be hypercoagulable due to COVID.  - cont prozac for motor recovery    #Acute Hypoxic Respiratory Failure   # COVID-19 PNA    #Essential HTN controlled    #type II Mobitz block, known RBBB  - s/p Medtronic Micra AV+ leadless pacemaker  - outpt EP followup    #T2DM controlled  - Lantus 25u qhs, humalog 6u TIDCC, SSI ACHS  - monitor fingersticks  -nutrition and hospitalist consults    #HLD  - cont simvastatin 20 qhs    #mood/sleep controlled    Reviewed w patient plan of care   VTE proph   Full Code

## 2020-04-25 NOTE — PROGRESS NOTE ADULT - ASSESSMENT
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  - fall and aspiration precautions  - cont prozac for motor recovery  -right SAFO evalution with liner  -PRECAUTIONS: airborne, contact, aspiration, cardiac, fall    #Acute Hypoxic Respiratory Failure secondary to COVID-19 PNA  Date of COVID testin20  - Hydroxychloroquine not given, pt with prolonged QTc  - EKG - QTc: 500 (), incomplete RBBB  - Incentive spirometry, robitussin DM, ocean spray  -O2 via NC 1-2 l keep O2 sat > 94%    #labs:  leukocytosis remain stable at 12.3    #HTN  - Lopressor 25 q12, amlodipine 5 qd, hydralazine 50mg qd  - monitor BP  hospitalist consult    #type II Mobitz block, known RBBB  - s/p Medtronic Micra AV+ leadless pacemaker  - outpt EP followup    #T2DM  - Lantus 25u qhs, humalog 6u TIDCC, SSI ACHS  - monitor fingersticks  -nutrition and hospitalist consults    #HLD  - cont simvastatin 20 qhs    #mood/sleep  - Prozac 10  - melatonin 3  -neuropsychology follow up. monitor closely given expressive deficits and awareness, frustration    #Pain Mgmt   - Tylenol PRN    #GI/Bowel Mgmt   - Continent  - pepcid    #/Bladder Mgmt   - Continent, PVR    #FEN   - Diet - Regular + Thins  [CCHO]  DASH/TLC    #- DVT PPX  : Lovenox 70mg Q12h

## 2020-04-25 NOTE — PROGRESS NOTE ADULT - SUBJECTIVE AND OBJECTIVE BOX
seen and examined at bedside,    no interval co     no fc   ros all others are neg     Vital Signs Last 24 Hrs    T(C): 37.1 (25 Apr 2020 09:07), Max: 37.1 (25 Apr 2020 09:07)  T(F): 98.7 (25 Apr 2020 09:07), Max: 98.7 (25 Apr 2020 09:07)  HR: 86 (25 Apr 2020 09:07) (76 - 86)  BP: 111/66 (25 Apr 2020 09:07) (111/66 - 167/81)  BP(mean): --  RR: 16 (25 Apr 2020 09:07) (16 - 17)  SpO2: 98% (25 Apr 2020 09:07) (98% - 98%)    MEDICATIONS  (STANDING):    amLODIPine   Tablet 5 milliGRAM(s) Oral daily  aspirin enteric coated 81 milliGRAM(s) Oral daily  atorvastatin 10 milliGRAM(s) Oral at bedtime  benzonatate 100 milliGRAM(s) Oral every 8 hours  dextrose 5%. 1000 milliLiter(s) (50 mL/Hr) IV Continuous <Continuous>  dextrose 50% Injectable 12.5 Gram(s) IV Push once  dextrose 50% Injectable 25 Gram(s) IV Push once  dextrose 50% Injectable 25 Gram(s) IV Push once  enoxaparin Injectable 70 milliGRAM(s) SubCutaneous every 12 hours  famotidine    Tablet 20 milliGRAM(s) Oral daily  FLUoxetine 10 milliGRAM(s) Oral daily  hydrALAZINE 50 milliGRAM(s) Oral daily  insulin glargine Injectable (LANTUS) 25 Unit(s) SubCutaneous at bedtime  insulin lispro (HumaLOG) corrective regimen sliding scale   SubCutaneous three times a day before meals  insulin lispro (HumaLOG) corrective regimen sliding scale   SubCutaneous at bedtime  insulin lispro Injectable (HumaLOG) 6 Unit(s) SubCutaneous three times a day before meals  melatonin 3 milliGRAM(s) Oral at bedtime  metoprolol tartrate 25 milliGRAM(s) Oral every 12 hours  sodium chloride 0.65% Nasal 1 Spray(s) Both Nostrils four times a day                                       11.4   12.08 )-----------( 456      ( 25 Apr 2020 09:40 )             34.3     04-24    137  |  100  |  16  ----------------------------<  301<H>  4.3   |  30  |  0.76    Ca    9.1      24 Apr 2020 09:15    TPro  7.5  /  Alb  3.1<L>  /  TBili  0.5  /  DBili  x   /  AST  29  /  ALT  27  /  AlkPhos  94  04-24                 12.5   12.35 )-----------( 498      ( 24 Apr 2020 09:15 )             37.8     04-24    137  |  100  |  16  ----------------------------<  301<H>  4.3   |  30  |  0.76    Ca    9.1      24 Apr 2020 09:15    TPro  7.5  /  Alb  3.1<L>  /  TBili  0.5  /  DBili  x   /  AST  29  /  ALT  27  /  AlkPhos  94  04-24

## 2020-04-25 NOTE — PROGRESS NOTE ADULT - SUBJECTIVE AND OBJECTIVE BOX
Patient is a 69y old  Female who presents with a chief complaint of left frontal parietal CVA with right HP and aphasia, COVID+ 4/8/20 (23 Apr 2020 14:26)      HPI:  SHIREEN CASH is a 70yo female RH dominant with PMH of HTN, HLD, T2DM, hx CVA, GERD, known RBBB presented to [facility] ED on 4/8/20 with complaints of fever, chills, cough, chest pain, SOB for 5 days. Per patient, her  tested +COVID at an outpatient testing facility a few days prior, patient herself had tested negative at the time. They continued to share the same living space. ED workup showed +COVID and Mobitz type II block. EP was consulted and patient is s/p Medtronic Micra AV+ leadless pacemaker implantation via right femoral vein on 4/10/20. Hydroxychloroquine presumably not given due to prolonged QTc.     On 4/16/20, patient developed acute aphasia and right sided weakness. CT head showed an acute LEFT frontal/parietal CVA. CTA head/neck showed LEFT ICA stenosis but no occlusion. tPA was not administered due to timing and no penumbra. Placed on full dose lovenox as thoguht to be hypercoagulable secondary to COVID. Patient was cleared for regular consistency solids, thin liquids. Transferred to [place] for acute inpatient rehab services 4/23/20. (23 Apr 2020 14:26)      Doing well today. Slept well. No chest pain, SOB, nausea, vomiting.     RECENT LABS/IMAGING                        11.4   12.08 )-----------( 456      ( 25 Apr 2020 09:40 )             34.3     04-24    137  |  100  |  16  ----------------------------<  301<H>  4.3   |  30  |  0.76    Ca    9.1      24 Apr 2020 09:15    TPro  7.5  /  Alb  3.1<L>  /  TBili  0.5  /  DBili  x   /  AST  29  /  ALT  27  /  AlkPhos  94  04-24      Vital Signs Last 24 Hrs  T(C): 37.1 (25 Apr 2020 09:07), Max: 37.1 (25 Apr 2020 09:07)  T(F): 98.7 (25 Apr 2020 09:07), Max: 98.7 (25 Apr 2020 09:07)  HR: 86 (25 Apr 2020 09:07) (76 - 86)  BP: 111/66 (25 Apr 2020 09:07) (111/66 - 167/81)  BP(mean): --  RR: 16 (25 Apr 2020 09:07) (16 - 17)  SpO2: 98% (25 Apr 2020 09:07) (98% - 98%)        PHYSICAL EXAM  Constitutional - NAD, Comfortable  HEENT - NCAT, EOMI  Neck - Supple, No limited ROM  Chest - CTA bilaterally  Cardiovascular - RRR  Abdomen - BS+, Soft, NTND  Extremities - No C/C/E, No calf tenderness       --------------------------------------------------------------------

## 2020-04-26 PROCEDURE — 93010 ELECTROCARDIOGRAM REPORT: CPT

## 2020-04-26 PROCEDURE — 99233 SBSQ HOSP IP/OBS HIGH 50: CPT

## 2020-04-26 PROCEDURE — 99232 SBSQ HOSP IP/OBS MODERATE 35: CPT | Mod: GC

## 2020-04-26 RX ADMIN — Medication 100 MILLIGRAM(S): at 06:37

## 2020-04-26 RX ADMIN — Medication 650 MILLIGRAM(S): at 23:00

## 2020-04-26 RX ADMIN — INSULIN GLARGINE 25 UNIT(S): 100 INJECTION, SOLUTION SUBCUTANEOUS at 22:51

## 2020-04-26 RX ADMIN — Medication 3: at 17:07

## 2020-04-26 RX ADMIN — Medication 1 SPRAY(S): at 17:11

## 2020-04-26 RX ADMIN — Medication 650 MILLIGRAM(S): at 17:08

## 2020-04-26 RX ADMIN — Medication 1 SPRAY(S): at 22:00

## 2020-04-26 RX ADMIN — Medication 6 UNIT(S): at 11:44

## 2020-04-26 RX ADMIN — FAMOTIDINE 20 MILLIGRAM(S): 10 INJECTION INTRAVENOUS at 11:45

## 2020-04-26 RX ADMIN — Medication 1: at 21:13

## 2020-04-26 RX ADMIN — Medication 3 MILLIGRAM(S): at 22:00

## 2020-04-26 RX ADMIN — Medication 100 MILLIGRAM(S): at 14:45

## 2020-04-26 RX ADMIN — Medication 50 MILLIGRAM(S): at 06:37

## 2020-04-26 RX ADMIN — Medication 10 MILLIGRAM(S): at 11:45

## 2020-04-26 RX ADMIN — Medication 81 MILLIGRAM(S): at 11:45

## 2020-04-26 RX ADMIN — Medication 25 MILLIGRAM(S): at 06:37

## 2020-04-26 RX ADMIN — Medication 3: at 11:44

## 2020-04-26 RX ADMIN — Medication 100 MILLIGRAM(S): at 22:50

## 2020-04-26 RX ADMIN — ENOXAPARIN SODIUM 70 MILLIGRAM(S): 100 INJECTION SUBCUTANEOUS at 17:08

## 2020-04-26 RX ADMIN — Medication 1 SPRAY(S): at 06:37

## 2020-04-26 RX ADMIN — ENOXAPARIN SODIUM 70 MILLIGRAM(S): 100 INJECTION SUBCUTANEOUS at 06:37

## 2020-04-26 RX ADMIN — Medication 6 UNIT(S): at 17:07

## 2020-04-26 RX ADMIN — AMLODIPINE BESYLATE 5 MILLIGRAM(S): 2.5 TABLET ORAL at 06:36

## 2020-04-26 RX ADMIN — Medication 1 SPRAY(S): at 11:48

## 2020-04-26 RX ADMIN — Medication 25 MILLIGRAM(S): at 17:08

## 2020-04-26 RX ADMIN — Medication 1 SPRAY(S): at 00:07

## 2020-04-26 RX ADMIN — ATORVASTATIN CALCIUM 10 MILLIGRAM(S): 80 TABLET, FILM COATED ORAL at 22:50

## 2020-04-26 RX ADMIN — Medication 6 UNIT(S): at 07:59

## 2020-04-26 RX ADMIN — Medication 1: at 07:59

## 2020-04-26 NOTE — PROGRESS NOTE ADULT - SUBJECTIVE AND OBJECTIVE BOX
Patient is a 69y old  Female who presents with a chief complaint of left frontal parietal CVA with right HP and aphasia, COVID+ 4/8/20 (23 Apr 2020 14:26)      HPI:  SHIREEN CASH is a 68yo female RH dominant with PMH of HTN, HLD, T2DM, hx CVA, GERD, known RBBB presented to [facility] ED on 4/8/20 with complaints of fever, chills, cough, chest pain, SOB for 5 days. Per patient, her  tested +COVID at an outpatient testing facility a few days prior, patient herself had tested negative at the time. They continued to share the same living space. ED workup showed +COVID and Mobitz type II block. EP was consulted and patient is s/p Medtronic Micra AV+ leadless pacemaker implantation via right femoral vein on 4/10/20. Hydroxychloroquine presumably not given due to prolonged QTc.     On 4/16/20, patient developed acute aphasia and right sided weakness. CT head showed an acute LEFT frontal/parietal CVA. CTA head/neck showed LEFT ICA stenosis but no occlusion. tPA was not administered due to timing and no penumbra. Placed on full dose lovenox as thoguht to be hypercoagulable secondary to COVID. Patient was cleared for regular consistency solids, thin liquids. Transferred to [place] for acute inpatient rehab services 4/23/20. (23 Apr 2020 14:26)      Doing well today. Slept well. No chest pain, SOB, nausea, vomiting.     Vital Signs Last 24 Hrs  T(C): 37 (26 Apr 2020 07:47), Max: 37.3 (25 Apr 2020 17:05)  T(F): 98.6 (26 Apr 2020 07:47), Max: 99.1 (25 Apr 2020 17:05)  HR: 76 (26 Apr 2020 07:47) (72 - 80)  BP: 127/67 (26 Apr 2020 07:47) (127/67 - 149/74)  BP(mean): --  RR: 16 (26 Apr 2020 07:47) (15 - 16)  SpO2: 97% (26 Apr 2020 07:47) (97% - 100%)      PHYSICAL EXAM  Constitutional - NAD, Comfortable  HEENT - NCAT, EOMI  Neck - Supple, No limited ROM  Chest - CTA bilaterally  Cardiovascular - RRR  Abdomen - BS+, Soft, NTND  Extremities - No C/C/E, No calf tenderness       --------------------------------------------------------------------

## 2020-04-26 NOTE — PROGRESS NOTE ADULT - ASSESSMENT
69 F essential HTN, HLD, T2DM, hx CVA, GERD, known RBBB with COVID-19 PNA   Left frontal and parietal CVA    #Left frontal/parietal CVA  - Gait Instability, ADL impairments and Functional impairments: start Comprehensive Rehab Program of PT/OT/SLP  - cont ASA, lovenox therapeutic dose as may be hypercoagulable due to COVID.  - cont prozac for motor recovery     #Acute Hypoxic Respiratory Failure stable on current o2 settings   # COVID-19 PNA    #Essential HTN controlled    #type II Mobitz block, known RBBB  - s/p Medtronic Micra AV+ leadless pacemaker  -  EP followup    #T2DM controlled  - Lantus 25u qhs, humalog 6u TIDCC, SSI ACHS  - ada   riss     #HLD  - cont simvastatin 20 qhs    #mood/sleep controlled    Reviewed w patient plan of care   VTE proph   Full Code

## 2020-04-26 NOTE — PROGRESS NOTE ADULT - SUBJECTIVE AND OBJECTIVE BOX
up in bed   smiling     on the phone   listening   to her family     hands me the phone ...    no interval co     no fc   ros all others are neg     Vital Signs Last 24 Hrs  T(C): 37 (26 Apr 2020 07:47), Max: 37.3 (25 Apr 2020 17:05)  T(F): 98.6 (26 Apr 2020 07:47), Max: 99.1 (25 Apr 2020 17:05)  HR: 76 (26 Apr 2020 07:47) (72 - 80)  BP: 127/67 (26 Apr 2020 07:47) (127/67 - 149/74)  BP(mean): --  RR: 16 (26 Apr 2020 07:47) (15 - 16)  SpO2: 97% (26 Apr 2020 07:47) (97% - 100%)    MEDICATIONS  (STANDING):    amLODIPine   Tablet 5 milliGRAM(s) Oral daily  aspirin enteric coated 81 milliGRAM(s) Oral daily  atorvastatin 10 milliGRAM(s) Oral at bedtime  benzonatate 100 milliGRAM(s) Oral every 8 hours  dextrose 5%. 1000 milliLiter(s) (50 mL/Hr) IV Continuous <Continuous>  dextrose 50% Injectable 12.5 Gram(s) IV Push once  dextrose 50% Injectable 25 Gram(s) IV Push once  dextrose 50% Injectable 25 Gram(s) IV Push once  enoxaparin Injectable 70 milliGRAM(s) SubCutaneous every 12 hours  famotidine    Tablet 20 milliGRAM(s) Oral daily  FLUoxetine 10 milliGRAM(s) Oral daily  hydrALAZINE 50 milliGRAM(s) Oral daily  insulin glargine Injectable (LANTUS) 25 Unit(s) SubCutaneous at bedtime  insulin lispro (HumaLOG) corrective regimen sliding scale   SubCutaneous three times a day before meals  insulin lispro (HumaLOG) corrective regimen sliding scale   SubCutaneous at bedtime  insulin lispro Injectable (HumaLOG) 6 Unit(s) SubCutaneous three times a day before meals  melatonin 3 milliGRAM(s) Oral at bedtime  metoprolol tartrate 25 milliGRAM(s) Oral every 12 hours  sodium chloride 0.65% Nasal 1 Spray(s) Both Nostrils four times a day                            11.4   12.08 )-----------( 456      ( 25 Apr 2020 09:40 )             34.3

## 2020-04-27 LAB
ALBUMIN SERPL ELPH-MCNC: 3.3 G/DL — SIGNIFICANT CHANGE UP (ref 3.3–5)
ALP SERPL-CCNC: 91 U/L — SIGNIFICANT CHANGE UP (ref 40–120)
ALT FLD-CCNC: 25 U/L — SIGNIFICANT CHANGE UP (ref 10–45)
ANION GAP SERPL CALC-SCNC: 7 MMOL/L — SIGNIFICANT CHANGE UP (ref 5–17)
ANION GAP SERPL CALC-SCNC: 8 MMOL/L — SIGNIFICANT CHANGE UP (ref 5–17)
AST SERPL-CCNC: 21 U/L — SIGNIFICANT CHANGE UP (ref 10–40)
BASOPHILS # BLD AUTO: 0.12 K/UL — SIGNIFICANT CHANGE UP (ref 0–0.2)
BASOPHILS NFR BLD AUTO: 1.4 % — SIGNIFICANT CHANGE UP (ref 0–2)
BILIRUB SERPL-MCNC: 0.4 MG/DL — SIGNIFICANT CHANGE UP (ref 0.2–1.2)
BUN SERPL-MCNC: 17 MG/DL — SIGNIFICANT CHANGE UP (ref 7–23)
BUN SERPL-MCNC: 18 MG/DL — SIGNIFICANT CHANGE UP (ref 7–23)
CALCIUM SERPL-MCNC: 9.6 MG/DL — SIGNIFICANT CHANGE UP (ref 8.4–10.5)
CALCIUM SERPL-MCNC: 9.7 MG/DL — SIGNIFICANT CHANGE UP (ref 8.4–10.5)
CHLORIDE SERPL-SCNC: 98 MMOL/L — SIGNIFICANT CHANGE UP (ref 96–108)
CHLORIDE SERPL-SCNC: 99 MMOL/L — SIGNIFICANT CHANGE UP (ref 96–108)
CO2 SERPL-SCNC: 29 MMOL/L — SIGNIFICANT CHANGE UP (ref 22–31)
CO2 SERPL-SCNC: 31 MMOL/L — SIGNIFICANT CHANGE UP (ref 22–31)
CREAT SERPL-MCNC: 0.81 MG/DL — SIGNIFICANT CHANGE UP (ref 0.5–1.3)
CREAT SERPL-MCNC: 0.84 MG/DL — SIGNIFICANT CHANGE UP (ref 0.5–1.3)
EOSINOPHIL # BLD AUTO: 0.15 K/UL — SIGNIFICANT CHANGE UP (ref 0–0.5)
EOSINOPHIL NFR BLD AUTO: 1.7 % — SIGNIFICANT CHANGE UP (ref 0–6)
GLUCOSE SERPL-MCNC: 337 MG/DL — HIGH (ref 70–99)
GLUCOSE SERPL-MCNC: 338 MG/DL — HIGH (ref 70–99)
HCT VFR BLD CALC: 38.4 % — SIGNIFICANT CHANGE UP (ref 34.5–45)
HGB BLD-MCNC: 12.5 G/DL — SIGNIFICANT CHANGE UP (ref 11.5–15.5)
IMM GRANULOCYTES NFR BLD AUTO: 3.6 % — HIGH (ref 0–1.5)
LYMPHOCYTES # BLD AUTO: 2.25 K/UL — SIGNIFICANT CHANGE UP (ref 1–3.3)
LYMPHOCYTES # BLD AUTO: 26 % — SIGNIFICANT CHANGE UP (ref 13–44)
MCHC RBC-ENTMCNC: 28.7 PG — SIGNIFICANT CHANGE UP (ref 27–34)
MCHC RBC-ENTMCNC: 32.6 GM/DL — SIGNIFICANT CHANGE UP (ref 32–36)
MCV RBC AUTO: 88.1 FL — SIGNIFICANT CHANGE UP (ref 80–100)
MONOCYTES # BLD AUTO: 0.83 K/UL — SIGNIFICANT CHANGE UP (ref 0–0.9)
MONOCYTES NFR BLD AUTO: 9.6 % — SIGNIFICANT CHANGE UP (ref 2–14)
NEUTROPHILS # BLD AUTO: 4.99 K/UL — SIGNIFICANT CHANGE UP (ref 1.8–7.4)
NEUTROPHILS NFR BLD AUTO: 57.7 % — SIGNIFICANT CHANGE UP (ref 43–77)
NRBC # BLD: 0 /100 WBCS — SIGNIFICANT CHANGE UP (ref 0–0)
PLATELET # BLD AUTO: 445 K/UL — HIGH (ref 150–400)
POTASSIUM SERPL-MCNC: 4.6 MMOL/L — SIGNIFICANT CHANGE UP (ref 3.5–5.3)
POTASSIUM SERPL-MCNC: 4.6 MMOL/L — SIGNIFICANT CHANGE UP (ref 3.5–5.3)
POTASSIUM SERPL-SCNC: 4.6 MMOL/L — SIGNIFICANT CHANGE UP (ref 3.5–5.3)
POTASSIUM SERPL-SCNC: 4.6 MMOL/L — SIGNIFICANT CHANGE UP (ref 3.5–5.3)
PROT SERPL-MCNC: 7.9 G/DL — SIGNIFICANT CHANGE UP (ref 6–8.3)
RBC # BLD: 4.36 M/UL — SIGNIFICANT CHANGE UP (ref 3.8–5.2)
RBC # FLD: 14.7 % — HIGH (ref 10.3–14.5)
SODIUM SERPL-SCNC: 135 MMOL/L — SIGNIFICANT CHANGE UP (ref 135–145)
SODIUM SERPL-SCNC: 137 MMOL/L — SIGNIFICANT CHANGE UP (ref 135–145)
WBC # BLD: 8.65 K/UL — SIGNIFICANT CHANGE UP (ref 3.8–10.5)
WBC # FLD AUTO: 8.65 K/UL — SIGNIFICANT CHANGE UP (ref 3.8–10.5)

## 2020-04-27 PROCEDURE — 96116 NUBHVL XM PHYS/QHP 1ST HR: CPT

## 2020-04-27 PROCEDURE — 99233 SBSQ HOSP IP/OBS HIGH 50: CPT

## 2020-04-27 RX ORDER — INSULIN LISPRO 100/ML
8 VIAL (ML) SUBCUTANEOUS
Refills: 0 | Status: DISCONTINUED | OUTPATIENT
Start: 2020-04-27 | End: 2020-04-30

## 2020-04-27 RX ORDER — INSULIN GLARGINE 100 [IU]/ML
32 INJECTION, SOLUTION SUBCUTANEOUS AT BEDTIME
Refills: 0 | Status: DISCONTINUED | OUTPATIENT
Start: 2020-04-27 | End: 2020-04-28

## 2020-04-27 RX ADMIN — Medication 1 SPRAY(S): at 05:35

## 2020-04-27 RX ADMIN — Medication 3: at 17:21

## 2020-04-27 RX ADMIN — Medication 4: at 08:00

## 2020-04-27 RX ADMIN — Medication 650 MILLIGRAM(S): at 14:50

## 2020-04-27 RX ADMIN — Medication 1 SPRAY(S): at 14:49

## 2020-04-27 RX ADMIN — Medication 100 MILLIGRAM(S): at 08:33

## 2020-04-27 RX ADMIN — FAMOTIDINE 20 MILLIGRAM(S): 10 INJECTION INTRAVENOUS at 12:04

## 2020-04-27 RX ADMIN — Medication 1 SPRAY(S): at 17:22

## 2020-04-27 RX ADMIN — Medication 100 MILLIGRAM(S): at 21:10

## 2020-04-27 RX ADMIN — Medication 8 UNIT(S): at 17:21

## 2020-04-27 RX ADMIN — INSULIN GLARGINE 32 UNIT(S): 100 INJECTION, SOLUTION SUBCUTANEOUS at 21:22

## 2020-04-27 RX ADMIN — Medication 25 MILLIGRAM(S): at 08:35

## 2020-04-27 RX ADMIN — Medication 100 MILLIGRAM(S): at 14:50

## 2020-04-27 RX ADMIN — ENOXAPARIN SODIUM 70 MILLIGRAM(S): 100 INJECTION SUBCUTANEOUS at 17:21

## 2020-04-27 RX ADMIN — Medication 10 MILLIGRAM(S): at 12:04

## 2020-04-27 RX ADMIN — Medication 81 MILLIGRAM(S): at 12:04

## 2020-04-27 RX ADMIN — Medication 6 UNIT(S): at 12:21

## 2020-04-27 RX ADMIN — Medication 1: at 21:22

## 2020-04-27 RX ADMIN — ATORVASTATIN CALCIUM 10 MILLIGRAM(S): 80 TABLET, FILM COATED ORAL at 21:10

## 2020-04-27 RX ADMIN — Medication 50 MILLIGRAM(S): at 08:34

## 2020-04-27 RX ADMIN — Medication 3 MILLIGRAM(S): at 21:10

## 2020-04-27 RX ADMIN — Medication 25 MILLIGRAM(S): at 17:21

## 2020-04-27 RX ADMIN — Medication 5: at 12:04

## 2020-04-27 RX ADMIN — ENOXAPARIN SODIUM 70 MILLIGRAM(S): 100 INJECTION SUBCUTANEOUS at 08:34

## 2020-04-27 RX ADMIN — Medication 6 UNIT(S): at 08:00

## 2020-04-27 RX ADMIN — AMLODIPINE BESYLATE 5 MILLIGRAM(S): 2.5 TABLET ORAL at 08:33

## 2020-04-27 NOTE — CONSULT NOTE ADULT - ASSESSMENT
Assessment:    FIM scores: Social Interaction ; Problem Solving ; Memory .  Plan: Individual supportive psychotherapy to monitor cognition, affect/mood, and behavior. Cognitive remediation during speech therapy sessions. Participation in recreation/art therapy in order to have pleasure and mastery experiences and regain/reestablish a sense of routine. 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 She evidenced difficulties in reality orientation, attention/concentration, working memory, short-term memory for verbal information (improving with cueing), and aspects of language (naming, repetition, fluency) and executive functions (abstract reasoning, initiation, problem solving). Pt is illiterate, which also contributes to her cognitive difficulties. Pt's affect is depressed and anxious, and she reports several adjustment symptoms of depression and anxiety as listed above in response to her current medical status. FIM scores: Social Interaction ; Problem Solving ; Memory .  Plan: Pt is not a candidate for supportive counseling due to her receptive and expressive difficulties. She might benefit from antidepressant medication to help her improve her coping. . Cognitive remediation during speech therapy sessions is recommended. Participation in recreation/art therapy in order to have pleasure and mastery experiences and regain/reestablish a sense of routine is also recommended.

## 2020-04-27 NOTE — PROGRESS NOTE ADULT - EXTREMITIES COMMENTS
right shoulder 1/5  right gross grasp 4+/5 elbow flexion 2-3/5  right knee+trace suprapatellar effusion, no warmth or erythema  +mild TTP and increased with knee extension passively  no calf swelling or warmth or erythema +proximal claf TTP

## 2020-04-27 NOTE — PROGRESS NOTE ADULT - COMMENTS
Patient seen with assistance language line  in [facility] 10:25 AM. Patient denies H/A, dizziness, RUe pain, however has RLE discomfort. Receptive language for the exam appears intact but still with expressive difficulties. Relies on gestures and head nods; simple yes/no with nodding/shaking head seems consistent and reliable    Pain in anterior /superior knee and posterior calf. No CP, SOB

## 2020-04-27 NOTE — PROGRESS NOTE ADULT - ASSESSMENT
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  - fall and aspiration precautions  - cont prozac for motor recovery  -right SAFO evalution with liner  -PRECAUTIONS: airborne, contact, aspiration, cardiac, fall    #right calf pain  doppler RLE, discussed with patient who is agreeable  -currently on therapeutic lovenox  -right knee effusion, likely due to instability and quad/ham weakness. Cold compress, ACE wrap PRN    #Acute Hypoxic Respiratory Failure secondary to COVID-19 PNA  Date of COVID testin20  - Hydroxychloroquine not given, pt with prolonged QTc  - EKG - QTc: 500 (), incomplete RBBB  - Incentive spirometry, robitussin DM, ocean spray  -O2 via NC 1-2 l keep O2 sat > 94%    #labs:  leukocytosis 12.3--> resolved 8.65   CBC     #HTN  - Lopressor 25 q12, amlodipine 5 qd, hydralazine 50mg qd  - monitor BP  -BP stable     #type II Mobitz block, known RBBB  - s/p Medtronic Micra AV+ leadless pacemaker  - outpt EP followup    #T2DM. Uncontrolled [id number] FS  - Lantus 25u qhs, humalog 6u--> increase 8 units qAC   - SSI ACHS  -nutrition and hospitalist consults    #HLD  - cont simvastatin 20 qhs    #mood/sleep  - Prozac 10  - melatonin 3  -neuropsychology follow up. monitor closely given expressive deficits and awareness, frustration    #Pain Mgmt   - Tylenol PRN    #GI/Bowel Mgmt   - Continent  - pepcid    #/Bladder Mgmt   - Continent, PVR    #FEN   - Diet - Regular + Thins  [CCHO]  DASH/TLC    #- DVT PPX  : Lovenox 70mg Q12h    #LABS  CBC, BMP   monitor FS  doppler RLE ASSESSMENT/PLAN  SHIREEN CASH is a 70yo Female with HTN, HLD, T2DM, hx CVA, GERD, known RBBB with COVID-19 debility and Left frontal and parietla CVA with residual right HP, foot drop, patricio inattention aphasia    #Left frontal/parietal CVA  - continue  Comprehensive Rehab Program of PT/OT/SLP  - cont ASA, lovenox therapeutic dose as may be hypercoagulable due to COVID.  - fall and aspiration precautions  - cont prozac for motor recovery  -right SAFO evalution with liner  -PRECAUTIONS: airborne, contact, aspiration, cardiac, fall    #right calf pain  doppler RLE, discussed with patient who is agreeable  -currently on therapeutic lovenox  -right knee effusion, likely due to instability and quad/ham weakness. Cold compress, ACE wrap PRN    #Acute Hypoxic Respiratory Failure secondary to COVID-19 PNA  Date of COVID testin20  - Hydroxychloroquine not given, pt with prolonged QTc  - EKG - QTc: 500 (), incomplete RBBB  - Incentive spirometry, robitussin DM, ocean spray  -O2 via NC 1-2 l keep O2 sat > 94%    #labs:  leukocytosis 12.3--> resolved 8.65   CBC     #HTN  - Lopressor 25 q12, amlodipine 5 qd, hydralazine 50mg qd  - monitor BP  -BP stable     #type II Mobitz block, known RBBB  - s/p Medtronic Micra AV+ leadless pacemaker  - outpt EP followup    #T2DM. Uncontrolled [id number] FS  - Lantus 25u qhs--> increase 32 units qhs, humalog 6u--> increase 8 units qAC   - SSI ACHS  -hospitalist recs appreciated    #HLD  - cont simvastatin 20 qhs    #mood/sleep  - Prozac 10  - melatonin 3  -neuropsychology follow up. monitor closely given expressive deficits and awareness, frustration    #Pain Mgmt   - Tylenol PRN    #GI/Bowel Mgmt   - Continent  - pepcid    #/Bladder Mgmt   - Continent, PVR    #FEN   - Diet - Regular + Thins  [CCHO]  DASH/TLC    #- DVT PPX  : Lovenox 70mg Q12h    #LABS  CBC, BMP   monitor FS  doppler RLE

## 2020-04-27 NOTE — PROGRESS NOTE ADULT - SUBJECTIVE AND OBJECTIVE BOX
Patient is a 69y old  Female who presents with a chief complaint of Left frontal parietal CVA with right HP and aphasia, COVID+ 4/8/20 (27 Apr 2020 09:03)      HPI:  SHIREEN CASH is a 68yo female RH dominant with PMH of HTN, HLD, T2DM, hx CVA, GERD, known RBBB presented to [facility] ED on 4/8/20 with complaints of fever, chills, cough, chest pain, SOB for 5 days. Per patient, her  tested +COVID at an outpatient testing facility a few days prior, patient herself had tested negative at the time. They continued to share the same living space. ED workup showed +COVID and Mobitz type II block. EP was consulted and patient is s/p Medtronic Micra AV+ leadless pacemaker implantation via right femoral vein on 4/10/20. Hydroxychloroquine presumably not given due to prolonged QTc.     On 4/16/20, patient developed acute aphasia and right sided weakness. CT head showed an acute LEFT frontal/parietal CVA. CTA head/neck showed LEFT ICA stenosis but no occlusion. tPA was not administered due to timing and no penumbra. Placed on full dose lovenox as thoguht to be hypercoagulable secondary to COVID. Patient was cleared for regular consistency solids, thin liquids. Transferred to [place] for acute inpatient rehab services 4/23/20. (23 Apr 2020 14:26)      PAST MEDICAL & SURGICAL HISTORY:  H/O gastroesophageal reflux (GERD)  HTN (hypertension)  DM (diabetes mellitus)  History of benign brain tumor      MEDICATIONS  (STANDING):  amLODIPine   Tablet 5 milliGRAM(s) Oral daily  aspirin enteric coated 81 milliGRAM(s) Oral daily  atorvastatin 10 milliGRAM(s) Oral at bedtime  benzonatate 100 milliGRAM(s) Oral every 8 hours  dextrose 5%. 1000 milliLiter(s) (50 mL/Hr) IV Continuous <Continuous>  dextrose 50% Injectable 12.5 Gram(s) IV Push once  dextrose 50% Injectable 25 Gram(s) IV Push once  dextrose 50% Injectable 25 Gram(s) IV Push once  enoxaparin Injectable 70 milliGRAM(s) SubCutaneous every 12 hours  famotidine    Tablet 20 milliGRAM(s) Oral daily  FLUoxetine 10 milliGRAM(s) Oral daily  hydrALAZINE 50 milliGRAM(s) Oral daily  insulin glargine Injectable (LANTUS) 25 Unit(s) SubCutaneous at bedtime  insulin lispro (HumaLOG) corrective regimen sliding scale   SubCutaneous three times a day before meals  insulin lispro (HumaLOG) corrective regimen sliding scale   SubCutaneous at bedtime  insulin lispro Injectable (HumaLOG) 6 Unit(s) SubCutaneous three times a day before meals  melatonin 3 milliGRAM(s) Oral at bedtime  metoprolol tartrate 25 milliGRAM(s) Oral every 12 hours  sodium chloride 0.65% Nasal 1 Spray(s) Both Nostrils four times a day    MEDICATIONS  (PRN):  acetaminophen   Tablet .. 650 milliGRAM(s) Oral every 6 hours PRN Temp greater or equal to 38C (100.4F), Mild Pain (1 - 3), Moderate Pain (4 - 6)  dextrose 40% Gel 15 Gram(s) Oral once PRN Blood Glucose LESS THAN 70 milliGRAM(s)/deciliter  glucagon  Injectable 1 milliGRAM(s) IntraMuscular once PRN Glucose LESS THAN 70 milligrams/deciliter      Allergies    No Known Allergies    Intolerances          VITALS  69y  Vital Signs Last 24 Hrs  T(C): 36.3 (27 Apr 2020 08:05), Max: 37.2 (26 Apr 2020 17:21)  T(F): 97.3 (27 Apr 2020 08:05), Max: 99 (26 Apr 2020 17:21)  HR: 80 (27 Apr 2020 08:05) (76 - 82)  BP: 126/72 (27 Apr 2020 08:05) (126/72 - 155/76)  BP(mean): --  RR: 14 (27 Apr 2020 08:05) (14 - 16)  SpO2: 99% (27 Apr 2020 08:05) (97% - 99%)  Daily     Daily         RECENT LABS:                          12.5   8.65  )-----------( 445      ( 27 Apr 2020 06:00 )             38.4     04-27    135  |  98  |  18  ----------------------------<  337<H>  4.6   |  29  |  0.81    Ca    9.6      27 Apr 2020 06:00    TPro  7.9  /  Alb  3.3  /  TBili  0.4  /  DBili  x   /  AST  21  /  ALT  25  /  AlkPhos  91  04-27    LIVER FUNCTIONS - ( 27 Apr 2020 06:00 )  Alb: 3.3 g/dL / Pro: 7.9 g/dL / ALK PHOS: 91 U/L / ALT: 25 U/L / AST: 21 U/L / GGT: x                   CAPILLARY BLOOD GLUCOSE      POCT Blood Glucose.: 326 mg/dL (27 Apr 2020 07:50)  POCT Blood Glucose.: 261 mg/dL (26 Apr 2020 20:57)  POCT Blood Glucose.: 271 mg/dL (26 Apr 2020 16:39)

## 2020-04-27 NOTE — CONSULT NOTE ADULT - SUBJECTIVE AND OBJECTIVE BOX
Pt is a 68yo right-handed female with PMH of HTN, HLD, T2DM, hx CVA, GERD, known RBBB presented to [facility] ED on 4/8/20 with complaints of fever, chills, cough, chest pain, SOB for 5 days. Per Pt, her  tested +COVID at an outpatient testing facility a few days prior, Pt herself had tested negative at the time. They continued to share the same living space. ED workup showed +COVID and Mobitz type II block. EP was consulted and patient is s/p Medtronic Micra AV+ leadless pacemaker implantation via right femoral vein on 4/10/20. Hydroxychloroquine presumably not given due to prolonged QTc. On 4/16/20, Pt developed acute aphasia and right sided weakness. CT head showed an acute LEFT frontal/parietal CVA. CTA head/neck showed LEFT ICA stenosis but no occlusion. tPA was not administered due to timing and no penumbra. Placed on full dose lovenox as thought to be hypercoagulable secondary to COVID. Pt was cleared for regular consistency solids, thin liquids. Transferred to [place] for acute inpatient rehab services 4/23/20.  PMHx:   . Current meds: Please see list in Pt’s chart. Social Hx:   Findings: Pt was seen for an initial assessment of his/her cognitive and emotional functioning. M was administered; Pt’s results (/30) were in the range. His/Her scores in mood measures suggested levels of anxiety/depression. Pt was alert,  Ox3, cooperative.  Attn/Conc:	 Language: 	Memory: Encoding of  words was;  short-delay verbal recall - ; long-delay recall - . LTM was for [employer] presidents. Visual memory –. Visuospatial: Visuomotor integration –. Executive Functions: Set-shifting - . Motor planning - . Organizational skills - . Abstract reasoning - . Initiation - . Self-awareness - . Verbal problem solving – .   Emotional functioning: Affect - 	. Mood -. Thought processes were.  No abnormal thought contents were observed.  Pt denied any AH/VH. Pt also denied SI/HI/I/P. Insight - WFL. Judgment - fair. Pt is a 68 y/o right-handed female with PMH of HTN, HLD, T2DM, hx CVA, GERD, known RBBB presented to [facility] ED on 4/8/20 with complaints of fever, chills, cough, chest pain, SOB for 5 days. Per Pt, her  tested +COVID at an outpatient testing facility a few days prior, Pt herself had tested negative at the time. They continued to share the same living space. ED workup showed +COVID and Mobitz type II block. EP was consulted and patient is s/p Medtronic Micra AV+ leadless pacemaker implantation via right femoral vein on 4/10/20. Hydroxychloroquine presumably not given due to prolonged QTc. On 4/16/20, Pt developed acute aphasia and right sided weakness. CT head showed an acute LEFT frontal/parietal CVA. CTA head/neck showed LEFT ICA stenosis but no occlusion. tPA was not administered due to timing and no penumbra. Placed on full dose lovenox as thought to be hypercoagulable secondary to COVID. Pt was cleared for regular consistency solids, thin liquids. Transferred to [place] for acute inpatient rehab services 4/23/20.  PMHx: DM-II, GERD, HTN; h/o benign brain tumor. Current meds: Please see list in Pt’s chart. Social Hx: Pt is a poor historian. She is  and has two children. Pt is illiterate; she is unable to report what type of jobs she's had. Pt lacks hx of mental illness and substance abuse. She is [employer]. Pt enjoys being with her family.  Findings: Pt was seen for an initial assessment of her cognitive and emotional functioning. MoCA Welsh version was administered; Pt’s results (2/30) were in the Severely Impaired range. Her scores in mood measures suggested mild levels of anxiety and moderate of depression (GAD7 = 8/21; PHQ9 = 14/27). Pt was alert, partially Ox3 (person, year and city),  and cooperative. Attn/Conc: Simple/sustained auditory attention, concentration, and working memory - impaired. Language: Pt's speech is of normal volume and pitch, and variable pace. Naming, sentence repetition and semantic fluency - impaired. Memory: Encoding of 5 words was moderately impaired (2/5 after 2 learning trials);  delayed verbal recall - significantly impaired (0/5, improving to 3/5 with saturated cueing). LTM was moderately impaired for [place] presidents (1/4, improving to 3/4 with cueing). Visual memory – unable to assess. Visuospatial: Visuomotor integration – unable to assess. Executive Functions: Set-shifting - not assessed.  Organizational skills - not assessed. Abstract reasoning - impaired. Initiation - impaired. Verbal problem solving – impaired. Emotional functioning: Affect - depressed, anxious. Mood - euthymic ("good"); she also reported experiencing confusion, crying spells, helplessness/hopelessness, poor sleep, worry, decreased appetite, low energy and fatigue. On mood questionnaires Pt also reported depressed mood, poor concentration, and difficulty controlling her worrying and to relax. Thought processes were goal-directed.  No abnormal thought contents were observed.  Pt denied any AH/VH. Pt also denied SI/HI/I/P. Insight - poor. Judgment - poor.

## 2020-04-28 PROCEDURE — 93971 EXTREMITY STUDY: CPT | Mod: 26,RT

## 2020-04-28 PROCEDURE — 99232 SBSQ HOSP IP/OBS MODERATE 35: CPT

## 2020-04-28 PROCEDURE — 99233 SBSQ HOSP IP/OBS HIGH 50: CPT

## 2020-04-28 RX ORDER — LIDOCAINE 4 G/100G
1 CREAM TOPICAL DAILY
Refills: 0 | Status: DISCONTINUED | OUTPATIENT
Start: 2020-04-28 | End: 2020-05-29

## 2020-04-28 RX ORDER — INSULIN GLARGINE 100 [IU]/ML
37 INJECTION, SOLUTION SUBCUTANEOUS AT BEDTIME
Refills: 0 | Status: DISCONTINUED | OUTPATIENT
Start: 2020-04-28 | End: 2020-05-01

## 2020-04-28 RX ADMIN — Medication 8 UNIT(S): at 17:47

## 2020-04-28 RX ADMIN — ENOXAPARIN SODIUM 70 MILLIGRAM(S): 100 INJECTION SUBCUTANEOUS at 18:07

## 2020-04-28 RX ADMIN — Medication 10 MILLIGRAM(S): at 12:50

## 2020-04-28 RX ADMIN — Medication 8 UNIT(S): at 12:33

## 2020-04-28 RX ADMIN — INSULIN GLARGINE 37 UNIT(S): 100 INJECTION, SOLUTION SUBCUTANEOUS at 21:31

## 2020-04-28 RX ADMIN — Medication 3: at 08:13

## 2020-04-28 RX ADMIN — Medication 50 MILLIGRAM(S): at 05:17

## 2020-04-28 RX ADMIN — Medication 3 MILLIGRAM(S): at 21:32

## 2020-04-28 RX ADMIN — Medication 100 MILLIGRAM(S): at 12:50

## 2020-04-28 RX ADMIN — ATORVASTATIN CALCIUM 10 MILLIGRAM(S): 80 TABLET, FILM COATED ORAL at 21:31

## 2020-04-28 RX ADMIN — ENOXAPARIN SODIUM 70 MILLIGRAM(S): 100 INJECTION SUBCUTANEOUS at 05:16

## 2020-04-28 RX ADMIN — Medication 100 MILLIGRAM(S): at 21:31

## 2020-04-28 RX ADMIN — Medication 3: at 17:48

## 2020-04-28 RX ADMIN — Medication 81 MILLIGRAM(S): at 12:50

## 2020-04-28 RX ADMIN — Medication 8 UNIT(S): at 08:13

## 2020-04-28 RX ADMIN — Medication 1 SPRAY(S): at 05:17

## 2020-04-28 RX ADMIN — AMLODIPINE BESYLATE 5 MILLIGRAM(S): 2.5 TABLET ORAL at 05:16

## 2020-04-28 RX ADMIN — Medication 100 MILLIGRAM(S): at 05:16

## 2020-04-28 RX ADMIN — Medication 1 SPRAY(S): at 18:07

## 2020-04-28 RX ADMIN — Medication 25 MILLIGRAM(S): at 05:17

## 2020-04-28 RX ADMIN — Medication 25 MILLIGRAM(S): at 18:07

## 2020-04-28 RX ADMIN — Medication 1 SPRAY(S): at 12:51

## 2020-04-28 RX ADMIN — FAMOTIDINE 20 MILLIGRAM(S): 10 INJECTION INTRAVENOUS at 12:50

## 2020-04-28 RX ADMIN — Medication 4: at 12:33

## 2020-04-28 NOTE — PROGRESS NOTE ADULT - ASSESSMENT
70yo Female with HTN, HLD, T2DM, hx CVA, GERD, known RBBB with COVID-19 debility and Left frontal and parietla CVA with residual right HP, foot drop, patricio inattention aphasia- pt/ot/dvt ppx  - cont ASA, lovenox therapeutic dose as may be hypercoagulable due to COVID.  - cont prozac for motor recovery  -right SAFO evalution with liner      #right calf pain  doppler RLE pending  -currently on therapeutic lovenox    #Acute Hypoxic Respiratory Failure secondary to COVID-19 PNA  Date of COVID testin20  - Hydroxychloroquine not given, pt with prolonged QTc  - EKG - QTc: 500 (), incomplete RBBB  - Incentive spirometry, robitussin DM, ocean spray  -O2 support    #HTN  - Lopressor  amlodipine  hydralazine       #type II Mobitz block, known RBBB  - s/p Medtronic Micra AV+ leadless pacemaker  - outpt EP followup    #T2DM.  - increase 37 units qhs, c/w Humalog  8 units tid  - iss, accuchecks    #HLD  - cont simvastatin 20 qhs    #mood/sleep  - Prozac 10  - melatonin 3    #- DVT PPX  : Lovenox 70mg Q12h    will follow  d/w dr. dutton

## 2020-04-28 NOTE — PROGRESS NOTE ADULT - COMMENTS
Patient continues to have discomfort in right knee, mostly anterior knee. ACE wrap in place, effusion decreased. Doppler study still pending    Otherwise, no new areas of discomfort. Continues to have word finding deficits, severe expressive aphasia with receptive component adequate to allow simple command following. No fever, cough, chills. Seen with language line  in [place] 11 AM

## 2020-04-28 NOTE — PROGRESS NOTE ADULT - ASSESSMENT
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  - fall and aspiration precautions  - cont prozac for motor recovery  -right SAFO evalution with liner  -PRECAUTIONS: airborne, contact, aspiration, cardiac, fall    #right calf pain  doppler RLE pending  -currently on therapeutic lovenox  -right knee effusion, likely due to instability and quad/ham weakness. Cold compress, ACE wrap PRN  -if doppler negative will order lidoderm patch right knee    #Acute Hypoxic Respiratory Failure secondary to COVID-19 PNA  Date of COVID testin20  - Hydroxychloroquine not given, pt with prolonged QTc  - EKG - QTc: 500 (), incomplete RBBB  - Incentive spirometry, robitussin DM, ocean spray  -O2 via NC 1-2 l keep O2 sat > 94%    #labs:  leukocytosis 12.3--> resolved 8.65   CBC     #HTN  - Lopressor 25 q12, amlodipine 5 qd, hydralazine 50mg qd  - monitor BP  -BP stable     #type II Mobitz block, known RBBB  - s/p Medtronic Micra AV+ leadless pacemaker  - outpt EP followup    #T2DM.  - Lantus 25u qhs--> increase 32 units qhs, humalog 6u--> increase 8 units qAC   - SSI ACHS  - Continued uncontrolled 280-346. hospitalist f/u  -diabetic educator consult     #HLD  - cont simvastatin 20 qhs    #mood/sleep  - Prozac 10  - melatonin 3    #Pain Mgmt   - Tylenol PRN    #GI/Bowel Mgmt   - Continent  - pepcid    #/Bladder Mgmt   - Continent, PVR    #FEN   - Diet - Regular + Thins  [[facility]]  DASH/TLC    #- DVT PPX  : Lovenox 70mg Q12h    #LABS  CBC, BMP   monitor FS  doppler RLE  -diabetic educator consult

## 2020-04-28 NOTE — PROGRESS NOTE ADULT - SUBJECTIVE AND OBJECTIVE BOX
Patient is a 69y old  Female who presents with a chief complaint of left frontal parietal CVA with right HP and aphasia, COVID+ 4/8/20 (27 Apr 2020 11:27)      HPI:  SHIREEN CASH is a 68yo female RH dominant with PMH of HTN, HLD, T2DM, hx CVA, GERD, known RBBB presented to [facility] ED on 4/8/20 with complaints of fever, chills, cough, chest pain, SOB for 5 days. Per patient, her  tested +COVID at an outpatient testing facility a few days prior, patient herself had tested negative at the time. They continued to share the same living space. ED workup showed +COVID and Mobitz type II block. EP was consulted and patient is s/p Medtronic Micra AV+ leadless pacemaker implantation via right femoral vein on 4/10/20. Hydroxychloroquine presumably not given due to prolonged QTc.     On 4/16/20, patient developed acute aphasia and right sided weakness. CT head showed an acute LEFT frontal/parietal CVA. CTA head/neck showed LEFT ICA stenosis but no occlusion. tPA was not administered due to timing and no penumbra. Placed on full dose lovenox as thoguht to be hypercoagulable secondary to COVID. Patient was cleared for regular consistency solids, thin liquids. Transferred to [place] for acute inpatient rehab services 4/23/20. (23 Apr 2020 14:26)      PAST MEDICAL & SURGICAL HISTORY:  H/O gastroesophageal reflux (GERD)  HTN (hypertension)  DM (diabetes mellitus)  History of benign brain tumor      MEDICATIONS  (STANDING):  amLODIPine   Tablet 5 milliGRAM(s) Oral daily  aspirin enteric coated 81 milliGRAM(s) Oral daily  atorvastatin 10 milliGRAM(s) Oral at bedtime  benzonatate 100 milliGRAM(s) Oral every 8 hours  dextrose 5%. 1000 milliLiter(s) (50 mL/Hr) IV Continuous <Continuous>  dextrose 50% Injectable 12.5 Gram(s) IV Push once  dextrose 50% Injectable 25 Gram(s) IV Push once  dextrose 50% Injectable 25 Gram(s) IV Push once  enoxaparin Injectable 70 milliGRAM(s) SubCutaneous every 12 hours  famotidine    Tablet 20 milliGRAM(s) Oral daily  FLUoxetine 10 milliGRAM(s) Oral daily  hydrALAZINE 50 milliGRAM(s) Oral daily  insulin glargine Injectable (LANTUS) 32 Unit(s) SubCutaneous at bedtime  insulin lispro (HumaLOG) corrective regimen sliding scale   SubCutaneous three times a day before meals  insulin lispro (HumaLOG) corrective regimen sliding scale   SubCutaneous at bedtime  insulin lispro Injectable (HumaLOG) 8 Unit(s) SubCutaneous three times a day before meals  melatonin 3 milliGRAM(s) Oral at bedtime  metoprolol tartrate 25 milliGRAM(s) Oral every 12 hours  sodium chloride 0.65% Nasal 1 Spray(s) Both Nostrils four times a day    MEDICATIONS  (PRN):  acetaminophen   Tablet .. 650 milliGRAM(s) Oral every 6 hours PRN Temp greater or equal to 38C (100.4F), Mild Pain (1 - 3), Moderate Pain (4 - 6)  dextrose 40% Gel 15 Gram(s) Oral once PRN Blood Glucose LESS THAN 70 milliGRAM(s)/deciliter  glucagon  Injectable 1 milliGRAM(s) IntraMuscular once PRN Glucose LESS THAN 70 milligrams/deciliter      Allergies    No Known Allergies    Intolerances          VITALS  69y  Vital Signs Last 24 Hrs  T(C): 36.9 (28 Apr 2020 05:53), Max: 36.9 (27 Apr 2020 23:08)  T(F): 98.4 (28 Apr 2020 05:53), Max: 98.5 (27 Apr 2020 23:08)  HR: 75 (28 Apr 2020 05:53) (75 - 89)  BP: 131/75 (28 Apr 2020 05:53) (131/75 - 153/74)  BP(mean): --  RR: 14 (28 Apr 2020 05:53) (14 - 14)  SpO2: 100% (28 Apr 2020 05:53) (97% - 100%)  Daily     Daily         RECENT LABS:                          12.5   8.65  )-----------( 445      ( 27 Apr 2020 06:00 )             38.4     04-27    135  |  98  |  18  ----------------------------<  337<H>  4.6   |  29  |  0.81    Ca    9.6      27 Apr 2020 06:00    TPro  7.9  /  Alb  3.3  /  TBili  0.4  /  DBili  x   /  AST  21  /  ALT  25  /  AlkPhos  91  04-27    LIVER FUNCTIONS - ( 27 Apr 2020 06:00 )  Alb: 3.3 g/dL / Pro: 7.9 g/dL / ALK PHOS: 91 U/L / ALT: 25 U/L / AST: 21 U/L / GGT: x                   CAPILLARY BLOOD GLUCOSE      POCT Blood Glucose.: 346 mg/dL (28 Apr 2020 11:43)  POCT Blood Glucose.: 280 mg/dL (28 Apr 2020 08:08)  POCT Blood Glucose.: 252 mg/dL (27 Apr 2020 21:04)  POCT Blood Glucose.: 267 mg/dL (27 Apr 2020 16:33)

## 2020-04-28 NOTE — PROGRESS NOTE ADULT - EXTREMITIES COMMENTS
no calf swelilng +soft, no erythema or warmth  continued proximal posterior calf TTP, no cord palpable  no warmth or erythema knee, trace right knee effusion  _+guarding and pain with knee extension passively

## 2020-04-28 NOTE — PROGRESS NOTE ADULT - SUBJECTIVE AND OBJECTIVE BOX
69y old  Female who presents with a chief complaint of left frontal parietal CVA with right HP and aphasia, COVID+ 4/8/20     seen at the bedside, no new complaints no n/v, no sob    Vital Signs Last 24 Hrs  T(C): 36.9 (28 Apr 2020 05:53), Max: 36.9 (27 Apr 2020 23:08)  T(F): 98.4 (28 Apr 2020 05:53), Max: 98.5 (27 Apr 2020 23:08)  HR: 75 (28 Apr 2020 05:53) (75 - 89)  BP: 131/75 (28 Apr 2020 05:53) (131/75 - 153/74)  BP(mean): --  RR: 14 (28 Apr 2020 05:53) (14 - 14)  SpO2: 100% (28 Apr 2020 05:53) (97% - 100%)    GENERAL- NAD  EAR/NOSE/MOUTH/THROAT - no pharyngeal exudates, no oral leisions,  MMM  EYES- MACHO, conjunctiva and Sclera clear  NECK- supple  RESPIRATORY-  clear to auscultation bilaterally  CARDIOVASCULAR - SIS2, RRR  GI - soft NT BS present  EXTREMITIES- no pedal edema  NEUROLOGY- right sided weakness  PSYCHIATRY- AAO X 3  MUSCULOSKELETAL- ROM restricted to right side                      12.5                 135  | 29   | 18           8.65  >-----------< 445     ------------------------< 337                   38.4                 4.6  | 98   | 0.81                                         Ca 9.6   Mg x     Ph x            CAPILLARY BLOOD GLUCOSE      POCT Blood Glucose.: 346 mg/dL (28 Apr 2020 11:43)  POCT Blood Glucose.: 280 mg/dL (28 Apr 2020 08:08)  POCT Blood Glucose.: 252 mg/dL (27 Apr 2020 21:04)  POCT Blood Glucose.: 267 mg/dL (27 Apr 2020 16:33)      Hemoglobin A1C, Whole Blood (06.25.17 @ 07:51)    Hemoglobin A1C, Whole Blood: 10.2 %

## 2020-04-29 PROCEDURE — 99232 SBSQ HOSP IP/OBS MODERATE 35: CPT

## 2020-04-29 RX ORDER — LACTULOSE 10 G/15ML
10 SOLUTION ORAL DAILY
Refills: 0 | Status: DISCONTINUED | OUTPATIENT
Start: 2020-04-29 | End: 2020-05-29

## 2020-04-29 RX ORDER — GABAPENTIN 400 MG/1
300 CAPSULE ORAL AT BEDTIME
Refills: 0 | Status: DISCONTINUED | OUTPATIENT
Start: 2020-04-29 | End: 2020-05-29

## 2020-04-29 RX ORDER — BACITRACIN ZINC 500 UNIT/G
1 OINTMENT IN PACKET (EA) TOPICAL DAILY
Refills: 0 | Status: DISCONTINUED | OUTPATIENT
Start: 2020-04-29 | End: 2020-05-20

## 2020-04-29 RX ADMIN — Medication 100 MILLIGRAM(S): at 13:04

## 2020-04-29 RX ADMIN — AMLODIPINE BESYLATE 5 MILLIGRAM(S): 2.5 TABLET ORAL at 05:56

## 2020-04-29 RX ADMIN — LIDOCAINE 1 PATCH: 4 CREAM TOPICAL at 20:58

## 2020-04-29 RX ADMIN — Medication 3 MILLIGRAM(S): at 20:58

## 2020-04-29 RX ADMIN — ATORVASTATIN CALCIUM 10 MILLIGRAM(S): 80 TABLET, FILM COATED ORAL at 20:58

## 2020-04-29 RX ADMIN — LIDOCAINE 1 PATCH: 4 CREAM TOPICAL at 12:18

## 2020-04-29 RX ADMIN — GABAPENTIN 300 MILLIGRAM(S): 400 CAPSULE ORAL at 20:58

## 2020-04-29 RX ADMIN — Medication 81 MILLIGRAM(S): at 12:19

## 2020-04-29 RX ADMIN — Medication 25 MILLIGRAM(S): at 17:36

## 2020-04-29 RX ADMIN — ENOXAPARIN SODIUM 70 MILLIGRAM(S): 100 INJECTION SUBCUTANEOUS at 17:36

## 2020-04-29 RX ADMIN — Medication 3: at 08:04

## 2020-04-29 RX ADMIN — Medication 25 MILLIGRAM(S): at 05:57

## 2020-04-29 RX ADMIN — Medication 1: at 17:36

## 2020-04-29 RX ADMIN — Medication 3: at 12:18

## 2020-04-29 RX ADMIN — Medication 1 SPRAY(S): at 12:19

## 2020-04-29 RX ADMIN — Medication 8 UNIT(S): at 08:05

## 2020-04-29 RX ADMIN — Medication 50 MILLIGRAM(S): at 05:57

## 2020-04-29 RX ADMIN — Medication 8 UNIT(S): at 12:18

## 2020-04-29 RX ADMIN — FAMOTIDINE 20 MILLIGRAM(S): 10 INJECTION INTRAVENOUS at 12:19

## 2020-04-29 RX ADMIN — INSULIN GLARGINE 37 UNIT(S): 100 INJECTION, SOLUTION SUBCUTANEOUS at 20:58

## 2020-04-29 RX ADMIN — Medication 100 MILLIGRAM(S): at 20:58

## 2020-04-29 RX ADMIN — Medication 650 MILLIGRAM(S): at 09:11

## 2020-04-29 RX ADMIN — Medication 1 APPLICATION(S): at 14:57

## 2020-04-29 RX ADMIN — Medication 1 SPRAY(S): at 17:37

## 2020-04-29 RX ADMIN — Medication 1 SPRAY(S): at 05:57

## 2020-04-29 RX ADMIN — Medication 10 MILLIGRAM(S): at 12:19

## 2020-04-29 RX ADMIN — ENOXAPARIN SODIUM 70 MILLIGRAM(S): 100 INJECTION SUBCUTANEOUS at 05:56

## 2020-04-29 RX ADMIN — Medication 100 MILLIGRAM(S): at 05:56

## 2020-04-29 RX ADMIN — Medication 8 UNIT(S): at 17:36

## 2020-04-29 NOTE — PROGRESS NOTE ADULT - SUBJECTIVE AND OBJECTIVE BOX
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 Per patient, her  tested +COVID at an outpatient testing facility a few days prior, patient herself had tested negative at the time. They continued to share the same living space. ED workup showed +COVID and Mobitz type II block. EP was consulted and patient is s/p Medtronic Micra AV+ leadless pacemaker implantation via right femoral vein on 4/10/20. Hydroxychloroquine presumably not given due to prolonged QTc.     On 4/16/20, patient developed acute aphasia and right sided weakness. CT head showed an acute LEFT frontal/parietal CVA. CTA head/neck showed LEFT ICA stenosis but no occlusion. tPA was not administered due to timing and no penumbra. Placed on full dose lovenox as thoguht to be hypercoagulable secondary to COVID. Patient was cleared for regular consistency solids, thin liquids. Transferred to [place] for acute inpatient rehab services 4/23/20. (23 Apr 2020 14:26)      PAST MEDICAL & SURGICAL HISTORY:  H/O gastroesophageal reflux (GERD)  HTN (hypertension)  DM (diabetes mellitus)  History of benign brain tumor      MEDICATIONS  (STANDING):  amLODIPine   Tablet 5 milliGRAM(s) Oral daily  aspirin enteric coated 81 milliGRAM(s) Oral daily  atorvastatin 10 milliGRAM(s) Oral at bedtime  benzonatate 100 milliGRAM(s) Oral every 8 hours  dextrose 5%. 1000 milliLiter(s) (50 mL/Hr) IV Continuous <Continuous>  dextrose 50% Injectable 12.5 Gram(s) IV Push once  dextrose 50% Injectable 25 Gram(s) IV Push once  dextrose 50% Injectable 25 Gram(s) IV Push once  enoxaparin Injectable 70 milliGRAM(s) SubCutaneous every 12 hours  famotidine    Tablet 20 milliGRAM(s) Oral daily  FLUoxetine 10 milliGRAM(s) Oral daily  hydrALAZINE 50 milliGRAM(s) Oral daily  insulin glargine Injectable (LANTUS) 37 Unit(s) SubCutaneous at bedtime  insulin lispro (HumaLOG) corrective regimen sliding scale   SubCutaneous three times a day before meals  insulin lispro (HumaLOG) corrective regimen sliding scale   SubCutaneous at bedtime  insulin lispro Injectable (HumaLOG) 8 Unit(s) SubCutaneous three times a day before meals  lidocaine   Patch 1 Patch Transdermal daily  melatonin 3 milliGRAM(s) Oral at bedtime  metoprolol tartrate 25 milliGRAM(s) Oral every 12 hours  sodium chloride 0.65% Nasal 1 Spray(s) Both Nostrils four times a day    MEDICATIONS  (PRN):  acetaminophen   Tablet .. 650 milliGRAM(s) Oral every 6 hours PRN Temp greater or equal to 38C (100.4F), Mild Pain (1 - 3), Moderate Pain (4 - 6)  dextrose 40% Gel 15 Gram(s) Oral once PRN Blood Glucose LESS THAN 70 milliGRAM(s)/deciliter  glucagon  Injectable 1 milliGRAM(s) IntraMuscular once PRN Glucose LESS THAN 70 milligrams/deciliter  lactulose Syrup 10 Gram(s) Oral daily PRN constipation      Allergies    No Known Allergies    Intolerances          VITALS  69y  Vital Signs Last 24 Hrs  T(C): 37.1 (29 Apr 2020 08:19), Max: 37.1 (29 Apr 2020 08:19)  T(F): 98.8 (29 Apr 2020 08:19), Max: 98.8 (29 Apr 2020 08:19)  HR: 75 (29 Apr 2020 08:19) (74 - 84)  BP: 113/67 (29 Apr 2020 08:19) (113/67 - 124/72)  BP(mean): --  RR: 14 (29 Apr 2020 08:27) (14 - 14)  SpO2: 94% (29 Apr 2020 08:27) (94% - 100%)  Daily     Daily         RECENT LABS:          EXAM:  US DPLX LWR EXT VEINS LTD RT      PROCEDURE DATE:  04/28/2020        INTERPRETATION:  CLINICAL INFORMATION: Right lower extremity pain. Pneumonia due to Covid 19.    COMPARISON: None available.    TECHNIQUE: Duplex sonography of the RIGHT LOWER extremity veins with color and spectral Doppler, with and without compression.      FINDINGS:    There is normal compressibility of the right common femoral, femoral and popliteal veins.     The contralateral common femoral vein is patent.    Doppler examination shows normal spontaneous and phasic flow.    No calf vein thrombosis is detected.    IMPRESSION:     No evidence of right lower extremity deep venous thrombosis.              JIAN RODAS M.D., ATTENDING RADIOLOGIST  This document has been electronically signed. Apr 28 2020  1:23PM                            CAPILLARY BLOOD GLUCOSE      POCT Blood Glucose.: 260 mg/dL (29 Apr 2020 12:12)  POCT Blood Glucose.: 276 mg/dL (29 Apr 2020 07:45)  POCT Blood Glucose.: 224 mg/dL (28 Apr 2020 21:03)  POCT Blood Glucose.: 259 mg/dL (28 Apr 2020 16:56)

## 2020-04-29 NOTE — PROGRESS NOTE ADULT - EXTREMITIES COMMENTS
no calf swelling +soft, mild proximal right calf TTP (improved from previous)  +right knee effusion no warmth, trace  pain with flexion/ext knee  ACE wrap applied

## 2020-04-29 NOTE — PROGRESS NOTE ADULT - COMMENTS
Patient stable . Still has right knee pain, mostly with palpation and movement, had doppler study yesterday which was NEGATIVE for DVT. Cough improving, no fever overnight. Had BM yesterday although patient did not remember/could not offer information independently.     seen with [employer] Language line  8:45 AM (Far[place])

## 2020-04-29 NOTE — PROGRESS NOTE ADULT - ASSESSMENT
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  - fall and aspiration precautions  - cont prozac for motor recovery  -right SAFO evalution with liner  -PRECAUTIONS: airborne, contact, aspiration, cardiac, fall    #right calf pain: anterior and suprapatellar effusion  -currently on therapeutic lovenox  -right knee effusion, likely due to instability and quad/ham weakness. Cold compress, ACE wrap PRN  -add lidoderm patch for pain daily  Doppler negative DVT     #Acute Hypoxic Respiratory Failure secondary to COVID-19 PNA  Date of COVID testin20  - Hydroxychloroquine not given, pt with prolonged QTc  - EKG - QTc: 500 (), incomplete RBBB  - Incentive spirometry, robitussin DM, ocean spray  -O2 via NC 1-2 l keep O2 sat > 94%    #labs:  leukocytosis 12.3--> resolved 8.65   CBC     #HTN  - Lopressor 25 q12, amlodipine 5 qd, hydralazine 50mg qd  - monitor BP  -BP stable     #type II Mobitz block, known RBBB  - s/p Medtronic Micra AV+ leadless pacemaker  - outpt EP followup    #T2DM.  - Lantus 25u qhs--> increase 32 units qhs -->increased 37 units   -, humalog 6u--> increase 8 units qAC   - SSI ACHS  - Continued improving but still uncontrolled 260-276  -diabetic educator consult     #HLD  - cont simvastatin 20 qhs    #mood/sleep  - Prozac 10  - melatonin 3    #Pain Mgmt   - Tylenol PRN    #GI/Bowel Mgmt   - Continent  - pepcid    #/Bladder Mgmt   - Continent, PVR    #FEN   - Diet - Regular + Thins  [[facility]]  DASH/TLC    #skin:  abrasion back. bacitracin and DSD daily     #- DVT PPX  : Lovenox 70mg Q12h    #Case discussed in IDT rounds :  -patient requires min assist UE/mod LE, trasnfers max assist. max assist toileting. Noted to have deficitsi n problem-solving, cognition independent of aphasia  -goals: CG bADLs, mod assist iADLs  -target dc home  with caregiver support and home Pt and OT, SLP.  -will need caregiver training    #LABS  CBC, BMP   monitor FS  -diabetic educator consult

## 2020-04-30 LAB
ALBUMIN SERPL ELPH-MCNC: 3.2 G/DL — LOW (ref 3.3–5)
ALP SERPL-CCNC: 63 U/L — SIGNIFICANT CHANGE UP (ref 40–120)
ALT FLD-CCNC: 25 U/L — SIGNIFICANT CHANGE UP (ref 10–45)
ANION GAP SERPL CALC-SCNC: 6 MMOL/L — SIGNIFICANT CHANGE UP (ref 5–17)
ANION GAP SERPL CALC-SCNC: 7 MMOL/L — SIGNIFICANT CHANGE UP (ref 5–17)
AST SERPL-CCNC: 19 U/L — SIGNIFICANT CHANGE UP (ref 10–40)
BASOPHILS # BLD AUTO: 0.09 K/UL — SIGNIFICANT CHANGE UP (ref 0–0.2)
BASOPHILS NFR BLD AUTO: 0.9 % — SIGNIFICANT CHANGE UP (ref 0–2)
BILIRUB SERPL-MCNC: 0.4 MG/DL — SIGNIFICANT CHANGE UP (ref 0.2–1.2)
BUN SERPL-MCNC: 29 MG/DL — HIGH (ref 7–23)
BUN SERPL-MCNC: 30 MG/DL — HIGH (ref 7–23)
CALCIUM SERPL-MCNC: 9.4 MG/DL — SIGNIFICANT CHANGE UP (ref 8.4–10.5)
CALCIUM SERPL-MCNC: 9.4 MG/DL — SIGNIFICANT CHANGE UP (ref 8.4–10.5)
CHLORIDE SERPL-SCNC: 100 MMOL/L — SIGNIFICANT CHANGE UP (ref 96–108)
CHLORIDE SERPL-SCNC: 100 MMOL/L — SIGNIFICANT CHANGE UP (ref 96–108)
CO2 SERPL-SCNC: 28 MMOL/L — SIGNIFICANT CHANGE UP (ref 22–31)
CO2 SERPL-SCNC: 30 MMOL/L — SIGNIFICANT CHANGE UP (ref 22–31)
CREAT SERPL-MCNC: 0.89 MG/DL — SIGNIFICANT CHANGE UP (ref 0.5–1.3)
CREAT SERPL-MCNC: 0.89 MG/DL — SIGNIFICANT CHANGE UP (ref 0.5–1.3)
EOSINOPHIL # BLD AUTO: 0.29 K/UL — SIGNIFICANT CHANGE UP (ref 0–0.5)
EOSINOPHIL NFR BLD AUTO: 2.8 % — SIGNIFICANT CHANGE UP (ref 0–6)
GLUCOSE SERPL-MCNC: 225 MG/DL — HIGH (ref 70–99)
GLUCOSE SERPL-MCNC: 225 MG/DL — HIGH (ref 70–99)
HCT VFR BLD CALC: 32.8 % — LOW (ref 34.5–45)
HGB BLD-MCNC: 10.6 G/DL — LOW (ref 11.5–15.5)
IMM GRANULOCYTES NFR BLD AUTO: 1.7 % — HIGH (ref 0–1.5)
LYMPHOCYTES # BLD AUTO: 2.45 K/UL — SIGNIFICANT CHANGE UP (ref 1–3.3)
LYMPHOCYTES # BLD AUTO: 23.8 % — SIGNIFICANT CHANGE UP (ref 13–44)
MCHC RBC-ENTMCNC: 28.6 PG — SIGNIFICANT CHANGE UP (ref 27–34)
MCHC RBC-ENTMCNC: 32.3 GM/DL — SIGNIFICANT CHANGE UP (ref 32–36)
MCV RBC AUTO: 88.4 FL — SIGNIFICANT CHANGE UP (ref 80–100)
MONOCYTES # BLD AUTO: 1.17 K/UL — HIGH (ref 0–0.9)
MONOCYTES NFR BLD AUTO: 11.4 % — SIGNIFICANT CHANGE UP (ref 2–14)
NEUTROPHILS # BLD AUTO: 6.11 K/UL — SIGNIFICANT CHANGE UP (ref 1.8–7.4)
NEUTROPHILS NFR BLD AUTO: 59.4 % — SIGNIFICANT CHANGE UP (ref 43–77)
NRBC # BLD: 0 /100 WBCS — SIGNIFICANT CHANGE UP (ref 0–0)
PLATELET # BLD AUTO: 401 K/UL — HIGH (ref 150–400)
POTASSIUM SERPL-MCNC: 5 MMOL/L — SIGNIFICANT CHANGE UP (ref 3.5–5.3)
POTASSIUM SERPL-MCNC: 5 MMOL/L — SIGNIFICANT CHANGE UP (ref 3.5–5.3)
POTASSIUM SERPL-SCNC: 5 MMOL/L — SIGNIFICANT CHANGE UP (ref 3.5–5.3)
POTASSIUM SERPL-SCNC: 5 MMOL/L — SIGNIFICANT CHANGE UP (ref 3.5–5.3)
PROT SERPL-MCNC: 7.1 G/DL — SIGNIFICANT CHANGE UP (ref 6–8.3)
RBC # BLD: 3.71 M/UL — LOW (ref 3.8–5.2)
RBC # FLD: 15.1 % — HIGH (ref 10.3–14.5)
SODIUM SERPL-SCNC: 135 MMOL/L — SIGNIFICANT CHANGE UP (ref 135–145)
SODIUM SERPL-SCNC: 136 MMOL/L — SIGNIFICANT CHANGE UP (ref 135–145)
WBC # BLD: 10.29 K/UL — SIGNIFICANT CHANGE UP (ref 3.8–10.5)
WBC # FLD AUTO: 10.29 K/UL — SIGNIFICANT CHANGE UP (ref 3.8–10.5)

## 2020-04-30 PROCEDURE — 99232 SBSQ HOSP IP/OBS MODERATE 35: CPT

## 2020-04-30 PROCEDURE — 99233 SBSQ HOSP IP/OBS HIGH 50: CPT

## 2020-04-30 RX ORDER — INSULIN LISPRO 100/ML
VIAL (ML) SUBCUTANEOUS
Refills: 0 | Status: DISCONTINUED | OUTPATIENT
Start: 2020-04-30 | End: 2020-05-20

## 2020-04-30 RX ORDER — INSULIN LISPRO 100/ML
VIAL (ML) SUBCUTANEOUS AT BEDTIME
Refills: 0 | Status: DISCONTINUED | OUTPATIENT
Start: 2020-04-30 | End: 2020-05-29

## 2020-04-30 RX ORDER — INSULIN LISPRO 100/ML
10 VIAL (ML) SUBCUTANEOUS
Refills: 0 | Status: DISCONTINUED | OUTPATIENT
Start: 2020-04-30 | End: 2020-05-01

## 2020-04-30 RX ADMIN — LIDOCAINE 1 PATCH: 4 CREAM TOPICAL at 12:25

## 2020-04-30 RX ADMIN — Medication 10 MILLIGRAM(S): at 12:25

## 2020-04-30 RX ADMIN — LIDOCAINE 1 PATCH: 4 CREAM TOPICAL at 19:12

## 2020-04-30 RX ADMIN — Medication 100 MILLIGRAM(S): at 13:01

## 2020-04-30 RX ADMIN — Medication 1 SPRAY(S): at 05:50

## 2020-04-30 RX ADMIN — Medication 8 UNIT(S): at 12:18

## 2020-04-30 RX ADMIN — Medication 25 MILLIGRAM(S): at 05:50

## 2020-04-30 RX ADMIN — Medication 3 MILLIGRAM(S): at 21:44

## 2020-04-30 RX ADMIN — Medication 2: at 17:11

## 2020-04-30 RX ADMIN — FAMOTIDINE 20 MILLIGRAM(S): 10 INJECTION INTRAVENOUS at 12:25

## 2020-04-30 RX ADMIN — Medication 25 MILLIGRAM(S): at 17:18

## 2020-04-30 RX ADMIN — Medication 81 MILLIGRAM(S): at 12:24

## 2020-04-30 RX ADMIN — Medication 10 UNIT(S): at 17:11

## 2020-04-30 RX ADMIN — Medication 3: at 08:25

## 2020-04-30 RX ADMIN — ATORVASTATIN CALCIUM 10 MILLIGRAM(S): 80 TABLET, FILM COATED ORAL at 21:45

## 2020-04-30 RX ADMIN — Medication 1 SPRAY(S): at 12:25

## 2020-04-30 RX ADMIN — Medication 100 MILLIGRAM(S): at 21:45

## 2020-04-30 RX ADMIN — Medication 100 MILLIGRAM(S): at 05:50

## 2020-04-30 RX ADMIN — AMLODIPINE BESYLATE 5 MILLIGRAM(S): 2.5 TABLET ORAL at 05:50

## 2020-04-30 RX ADMIN — GABAPENTIN 300 MILLIGRAM(S): 400 CAPSULE ORAL at 21:45

## 2020-04-30 RX ADMIN — LIDOCAINE 1 PATCH: 4 CREAM TOPICAL at 05:08

## 2020-04-30 RX ADMIN — ENOXAPARIN SODIUM 70 MILLIGRAM(S): 100 INJECTION SUBCUTANEOUS at 17:18

## 2020-04-30 RX ADMIN — Medication 1 APPLICATION(S): at 12:25

## 2020-04-30 RX ADMIN — ENOXAPARIN SODIUM 70 MILLIGRAM(S): 100 INJECTION SUBCUTANEOUS at 05:50

## 2020-04-30 RX ADMIN — Medication 1 SPRAY(S): at 17:18

## 2020-04-30 RX ADMIN — Medication 8 UNIT(S): at 08:25

## 2020-04-30 RX ADMIN — INSULIN GLARGINE 37 UNIT(S): 100 INJECTION, SOLUTION SUBCUTANEOUS at 21:44

## 2020-04-30 RX ADMIN — Medication 650 MILLIGRAM(S): at 14:03

## 2020-04-30 RX ADMIN — Medication 50 MILLIGRAM(S): at 05:50

## 2020-04-30 RX ADMIN — Medication 8: at 12:17

## 2020-04-30 NOTE — PROGRESS NOTE ADULT - EXTREMITIES COMMENTS
rigth knee suprapatellar mod-large effusion, no warmth or erythema +TTP over patella and with PROm knee  no calf swelling or pedal edema  RLE motor testing affected by pain, guarding  +lidoderm patch in place

## 2020-04-30 NOTE — PROGRESS NOTE ADULT - SUBJECTIVE AND OBJECTIVE BOX
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 Per patient, her  tested +COVID at an outpatient testing facility a few days prior, patient herself had tested negative at the time. They continued to share the same living space. ED workup showed +COVID and Mobitz type II block. EP was consulted and patient is s/p Medtronic Micra AV+ leadless pacemaker implantation via right femoral vein on 4/10/20. Hydroxychloroquine presumably not given due to prolonged QTc.     On 4/16/20, patient developed acute aphasia and right sided weakness. CT head showed an acute LEFT frontal/parietal CVA. CTA head/neck showed LEFT ICA stenosis but no occlusion. tPA was not administered due to timing and no penumbra. Placed on full dose lovenox as thoguht to be hypercoagulable secondary to COVID. Patient was cleared for regular consistency solids, thin liquids. Transferred to [place] for acute inpatient rehab services 4/23/20. (23 Apr 2020 14:26)      PAST MEDICAL & SURGICAL HISTORY:  H/O gastroesophageal reflux (GERD)  HTN (hypertension)  DM (diabetes mellitus)  History of benign brain tumor      MEDICATIONS  (STANDING):  amLODIPine   Tablet 5 milliGRAM(s) Oral daily  aspirin enteric coated 81 milliGRAM(s) Oral daily  atorvastatin 10 milliGRAM(s) Oral at bedtime  BACItracin   Ointment 1 Application(s) Topical daily  benzonatate 100 milliGRAM(s) Oral every 8 hours  dextrose 5%. 1000 milliLiter(s) (50 mL/Hr) IV Continuous <Continuous>  dextrose 50% Injectable 12.5 Gram(s) IV Push once  dextrose 50% Injectable 25 Gram(s) IV Push once  dextrose 50% Injectable 25 Gram(s) IV Push once  enoxaparin Injectable 70 milliGRAM(s) SubCutaneous every 12 hours  famotidine    Tablet 20 milliGRAM(s) Oral daily  FLUoxetine 10 milliGRAM(s) Oral daily  gabapentin 300 milliGRAM(s) Oral at bedtime  hydrALAZINE 50 milliGRAM(s) Oral daily  insulin glargine Injectable (LANTUS) 37 Unit(s) SubCutaneous at bedtime  insulin lispro (HumaLOG) corrective regimen sliding scale   SubCutaneous three times a day before meals  insulin lispro (HumaLOG) corrective regimen sliding scale   SubCutaneous at bedtime  insulin lispro Injectable (HumaLOG) 8 Unit(s) SubCutaneous three times a day before meals  lidocaine   Patch 1 Patch Transdermal daily  melatonin 3 milliGRAM(s) Oral at bedtime  metoprolol tartrate 25 milliGRAM(s) Oral every 12 hours  sodium chloride 0.65% Nasal 1 Spray(s) Both Nostrils four times a day    MEDICATIONS  (PRN):  acetaminophen   Tablet .. 650 milliGRAM(s) Oral every 6 hours PRN Temp greater or equal to 38C (100.4F), Mild Pain (1 - 3), Moderate Pain (4 - 6)  dextrose 40% Gel 15 Gram(s) Oral once PRN Blood Glucose LESS THAN 70 milliGRAM(s)/deciliter  glucagon  Injectable 1 milliGRAM(s) IntraMuscular once PRN Glucose LESS THAN 70 milligrams/deciliter  lactulose Syrup 10 Gram(s) Oral daily PRN constipation      Allergies    No Known Allergies    Intolerances          VITALS  69y  Vital Signs Last 24 Hrs  T(C): 37.1 (30 Apr 2020 08:58), Max: 37.1 (30 Apr 2020 08:58)  T(F): 98.8 (30 Apr 2020 08:58), Max: 98.8 (30 Apr 2020 08:58)  HR: 62 (30 Apr 2020 08:58) (62 - 82)  BP: 120/68 (30 Apr 2020 08:58) (120/68 - 151/73)  BP(mean): --  RR: 12 (30 Apr 2020 08:58) (12 - 14)  SpO2: 98% (30 Apr 2020 08:58) (94% - 100%)  Daily     Daily         RECENT LABS:                          10.6   10.29 )-----------( 401      ( 30 Apr 2020 06:30 )             32.8     04-30    135  |  100  |  30<H>  ----------------------------<  225<H>  5.0   |  28  |  0.89    Ca    9.4      30 Apr 2020 06:30    TPro  7.1  /  Alb  3.2<L>  /  TBili  0.4  /  DBili  x   /  AST  19  /  ALT  25  /  AlkPhos  63  04-30    LIVER FUNCTIONS - ( 30 Apr 2020 06:30 )  Alb: 3.2 g/dL / Pro: 7.1 g/dL / ALK PHOS: 63 U/L / ALT: 25 U/L / AST: 19 U/L / GGT: x                   CAPILLARY BLOOD GLUCOSE      POCT Blood Glucose.: 317 mg/dL (30 Apr 2020 11:07)  POCT Blood Glucose.: 253 mg/dL (30 Apr 2020 08:19)  POCT Blood Glucose.: 241 mg/dL (29 Apr 2020 20:56)  POCT Blood Glucose.: 169 mg/dL (29 Apr 2020 17:20)

## 2020-04-30 NOTE — PROGRESS NOTE ADULT - ASSESSMENT
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  - fall and aspiration precautions  - cont prozac for motor recovery  -right SAFO evalution with liner  -PRECAUTIONS: airborne, contact, aspiration, cardiac, fall    #right knee pain: anterior and suprapatellar effusion  -currently on therapeutic lovenox  -right knee effusion, likely due to instability and quad/ham weakness. Cold compress, ACE wrap PRN  -add lidoderm patch for pain daily  Doppler negative DVT   -will order Xray right knee  discussed with patient    #Acute Hypoxic Respiratory Failure secondary to COVID-19 PNA  Date of COVID testin20  - Hydroxychloroquine not given, pt with prolonged QTc  - EKG - QTc: 500 (), incomplete RBBB  - Incentive spirometry, robitussin DM, ocean spray  -O2 via NC 1-2 l keep O2 sat > 94%    #labs:  leukocytosis 12.3--> resolved 8.65   CBC -->10.29   CBC     #HTN  - Lopressor 25 q12, amlodipine 5 qd, hydralazine 50mg qd  - monitor BP  -BP stable     #type II Mobitz block, known RBBB  - s/p Medtronic Micra AV+ leadless pacemaker  - outpt EP followup    #T2DM.  - Lantus 25u qhs--> increase 32 units qhs -->increased 37 units   -, humalog 6u--> increase 8 units qAC -->increase 10 units qAC   - SSI ACHS  - Continued improving but still uncontrolled 253-317. Further changes to insulin as above  -diabetic educator consult     #HLD  - cont simvastatin 20 qhs    #mood/sleep  - Prozac 10  - melatonin 3    #Pain Mgmt   - Tylenol PRN    #GI/Bowel Mgmt   - Continent  - pepcid    #/Bladder Mgmt   - Continent, PVR    #FEN   - [facility] + [facility]ins  [facility]  DASH/TLC  -BUn/Cr 30/0.89  encourage po fluids  BMp 5/2    #skin:  abrasion back. bacitracin and DSD daily     #- DVT PPX  : Lovenox 70mg Q12h    #Case discussed in IDT rounds :  -patient requires min assist UE/mod LE, trasnfers max assist. max assist toileting. Noted to have deficitsi n problem-solving, cognition independent of aphasia  -goals: CG bADLs, mod assist iADLs  -target dc home  with caregiver support and home Pt and OT, SLP.  -will need caregiver training    #LABS  right knee Xray  BMp 5/  CBC, BMP 5/  monitor FS  -diabetic educator consult

## 2020-04-30 NOTE — PROGRESS NOTE ADULT - COMMENTS
Patient stable. Has some improved spontaneous output although still severely aphasic. Reports improved right knee pain, however still has significant pain with palpation and ROM. Lidoder, patch in place

## 2020-05-01 PROCEDURE — 99233 SBSQ HOSP IP/OBS HIGH 50: CPT

## 2020-05-01 PROCEDURE — 73562 X-RAY EXAM OF KNEE 3: CPT | Mod: 26,RT

## 2020-05-01 PROCEDURE — 99232 SBSQ HOSP IP/OBS MODERATE 35: CPT

## 2020-05-01 RX ORDER — INSULIN LISPRO 100/ML
5 VIAL (ML) SUBCUTANEOUS
Refills: 0 | Status: DISCONTINUED | OUTPATIENT
Start: 2020-05-01 | End: 2020-05-04

## 2020-05-01 RX ORDER — INSULIN GLARGINE 100 [IU]/ML
42 INJECTION, SOLUTION SUBCUTANEOUS AT BEDTIME
Refills: 0 | Status: DISCONTINUED | OUTPATIENT
Start: 2020-05-01 | End: 2020-05-01

## 2020-05-01 RX ORDER — INSULIN GLARGINE 100 [IU]/ML
30 INJECTION, SOLUTION SUBCUTANEOUS AT BEDTIME
Refills: 0 | Status: DISCONTINUED | OUTPATIENT
Start: 2020-05-01 | End: 2020-05-07

## 2020-05-01 RX ORDER — METFORMIN HYDROCHLORIDE 850 MG/1
500 TABLET ORAL
Refills: 0 | Status: DISCONTINUED | OUTPATIENT
Start: 2020-05-01 | End: 2020-05-07

## 2020-05-01 RX ORDER — INSULIN ZINC HUMAN RECOMBINANT 100/ML
6 VIAL (ML) SUBCUTANEOUS
Qty: 0 | Refills: 0 | DISCHARGE

## 2020-05-01 RX ADMIN — METFORMIN HYDROCHLORIDE 500 MILLIGRAM(S): 850 TABLET ORAL at 16:46

## 2020-05-01 RX ADMIN — ENOXAPARIN SODIUM 70 MILLIGRAM(S): 100 INJECTION SUBCUTANEOUS at 16:46

## 2020-05-01 RX ADMIN — Medication 1 APPLICATION(S): at 12:36

## 2020-05-01 RX ADMIN — Medication 3 MILLIGRAM(S): at 22:19

## 2020-05-01 RX ADMIN — Medication 10 UNIT(S): at 12:36

## 2020-05-01 RX ADMIN — Medication 25 MILLIGRAM(S): at 06:15

## 2020-05-01 RX ADMIN — Medication 6: at 09:13

## 2020-05-01 RX ADMIN — Medication 100 MILLIGRAM(S): at 22:19

## 2020-05-01 RX ADMIN — Medication 100 MILLIGRAM(S): at 06:14

## 2020-05-01 RX ADMIN — AMLODIPINE BESYLATE 5 MILLIGRAM(S): 2.5 TABLET ORAL at 06:15

## 2020-05-01 RX ADMIN — GABAPENTIN 300 MILLIGRAM(S): 400 CAPSULE ORAL at 22:19

## 2020-05-01 RX ADMIN — LIDOCAINE 1 PATCH: 4 CREAM TOPICAL at 00:47

## 2020-05-01 RX ADMIN — LIDOCAINE 1 PATCH: 4 CREAM TOPICAL at 12:37

## 2020-05-01 RX ADMIN — ATORVASTATIN CALCIUM 10 MILLIGRAM(S): 80 TABLET, FILM COATED ORAL at 22:19

## 2020-05-01 RX ADMIN — ENOXAPARIN SODIUM 70 MILLIGRAM(S): 100 INJECTION SUBCUTANEOUS at 06:15

## 2020-05-01 RX ADMIN — Medication 1 SPRAY(S): at 16:46

## 2020-05-01 RX ADMIN — Medication 25 MILLIGRAM(S): at 16:46

## 2020-05-01 RX ADMIN — Medication 5 UNIT(S): at 16:45

## 2020-05-01 RX ADMIN — Medication 10 MILLIGRAM(S): at 12:36

## 2020-05-01 RX ADMIN — INSULIN GLARGINE 30 UNIT(S): 100 INJECTION, SOLUTION SUBCUTANEOUS at 22:19

## 2020-05-01 RX ADMIN — Medication 1 SPRAY(S): at 12:37

## 2020-05-01 RX ADMIN — FAMOTIDINE 20 MILLIGRAM(S): 10 INJECTION INTRAVENOUS at 12:36

## 2020-05-01 RX ADMIN — Medication 81 MILLIGRAM(S): at 12:35

## 2020-05-01 RX ADMIN — Medication 1 SPRAY(S): at 06:15

## 2020-05-01 RX ADMIN — Medication 1 SPRAY(S): at 00:48

## 2020-05-01 RX ADMIN — Medication 100 MILLIGRAM(S): at 12:36

## 2020-05-01 RX ADMIN — LIDOCAINE 1 PATCH: 4 CREAM TOPICAL at 19:50

## 2020-05-01 RX ADMIN — Medication 6: at 12:36

## 2020-05-01 RX ADMIN — Medication 50 MILLIGRAM(S): at 06:14

## 2020-05-01 RX ADMIN — Medication 10 UNIT(S): at 09:13

## 2020-05-01 NOTE — PROGRESS NOTE ADULT - SUBJECTIVE AND OBJECTIVE BOX
Patient is a 69y old  Female who presents with a chief complaint of left frontal parietal CVA with right HP and aphasia, COVID+ 4/8/20 (01 May 2020 11:12)      HPI:  SHIREEN CASH is a 68yo female RH dominant with PMH of HTN, HLD, T2DM, hx CVA, GERD, known RBBB presented to [facility] ED on 4/8/20 with complaints of fever, chills, cough, chest pain, SOB for 5 days. Per patient, her  tested +COVID at an outpatient testing facility a few days prior, patient herself had tested negative at the time. They continued to share the same living space. ED workup showed +COVID and Mobitz type II block. EP was consulted and patient is s/p Medtronic Micra AV+ leadless pacemaker implantation via right femoral vein on 4/10/20. Hydroxychloroquine presumably not given due to prolonged QTc.     On 4/16/20, patient developed acute aphasia and right sided weakness. CT head showed an acute LEFT frontal/parietal CVA. CTA head/neck showed LEFT ICA stenosis but no occlusion. tPA was not administered due to timing and no penumbra. Placed on full dose lovenox as thoguht to be hypercoagulable secondary to COVID. Patient was cleared for regular consistency solids, thin liquids. Transferred to [facility] for acute inpatient rehab services 4/23/20. (23 Apr 2020 14:26)      PAST MEDICAL & SURGICAL HISTORY:  H/O gastroesophageal reflux (GERD)  HTN (hypertension)  DM (diabetes mellitus)  History of benign brain tumor      MEDICATIONS  (STANDING):  amLODIPine   Tablet 5 milliGRAM(s) Oral daily  aspirin enteric coated 81 milliGRAM(s) Oral daily  atorvastatin 10 milliGRAM(s) Oral at bedtime  BACItracin   Ointment 1 Application(s) Topical daily  benzonatate 100 milliGRAM(s) Oral every 8 hours  dextrose 5%. 1000 milliLiter(s) (50 mL/Hr) IV Continuous <Continuous>  dextrose 50% Injectable 12.5 Gram(s) IV Push once  dextrose 50% Injectable 25 Gram(s) IV Push once  dextrose 50% Injectable 25 Gram(s) IV Push once  enoxaparin Injectable 70 milliGRAM(s) SubCutaneous every 12 hours  famotidine    Tablet 20 milliGRAM(s) Oral daily  FLUoxetine 10 milliGRAM(s) Oral daily  gabapentin 300 milliGRAM(s) Oral at bedtime  hydrALAZINE 50 milliGRAM(s) Oral daily  insulin glargine Injectable (LANTUS) 30 Unit(s) SubCutaneous at bedtime  insulin lispro (HumaLOG) corrective regimen sliding scale   SubCutaneous three times a day before meals  insulin lispro (HumaLOG) corrective regimen sliding scale   SubCutaneous at bedtime  insulin lispro Injectable (HumaLOG) 5 Unit(s) SubCutaneous before dinner  lidocaine   Patch 1 Patch Transdermal daily  melatonin 3 milliGRAM(s) Oral at bedtime  metFORMIN 500 milliGRAM(s) Oral two times a day with meals  metoprolol tartrate 25 milliGRAM(s) Oral every 12 hours  sodium chloride 0.65% Nasal 1 Spray(s) Both Nostrils four times a day    MEDICATIONS  (PRN):  acetaminophen   Tablet .. 650 milliGRAM(s) Oral every 6 hours PRN Temp greater or equal to 38C (100.4F), Mild Pain (1 - 3), Moderate Pain (4 - 6)  dextrose 40% Gel 15 Gram(s) Oral once PRN Blood Glucose LESS THAN 70 milliGRAM(s)/deciliter  glucagon  Injectable 1 milliGRAM(s) IntraMuscular once PRN Glucose LESS THAN 70 milligrams/deciliter  lactulose Syrup 10 Gram(s) Oral daily PRN constipation      Allergies    No Known Allergies    Intolerances          VITALS  69y  Vital Signs Last 24 Hrs  T(C): 36.6 (01 May 2020 09:14), Max: 36.7 (30 Apr 2020 20:24)  T(F): 97.8 (01 May 2020 09:14), Max: 98.1 (30 Apr 2020 20:24)  HR: 69 (01 May 2020 09:14) (65 - 72)  BP: 124/66 (01 May 2020 09:14) (124/66 - 151/72)  BP(mean): --  RR: 16 (01 May 2020 09:14) (16 - 17)  SpO2: 100% (01 May 2020 09:14) (100% - 100%)  Daily     Daily         RECENT LABS:                          10.6   10.29 )-----------( 401      ( 30 Apr 2020 06:30 )             32.8     04-30    135  |  100  |  30<H>  ----------------------------<  225<H>  5.0   |  28  |  0.89    Ca    9.4      30 Apr 2020 06:30    TPro  7.1  /  Alb  3.2<L>  /  TBili  0.4  /  DBili  x   /  AST  19  /  ALT  25  /  AlkPhos  63  04-30    LIVER FUNCTIONS - ( 30 Apr 2020 06:30 )  Alb: 3.2 g/dL / Pro: 7.1 g/dL / ALK PHOS: 63 U/L / ALT: 25 U/L / AST: 19 U/L / GGT: x           Xray right knee pending        CAPILLARY BLOOD GLUCOSE      POCT Blood Glucose.: 288 mg/dL (01 May 2020 12:05)  POCT Blood Glucose.: 263 mg/dL (01 May 2020 08:49)  POCT Blood Glucose.: 168 mg/dL (30 Apr 2020 21:36)  POCT Blood Glucose.: 184 mg/dL (30 Apr 2020 16:14)

## 2020-05-01 NOTE — CHART NOTE - NSCHARTNOTEFT_GEN_A_CORE
NUTRITION FOLLOW UP    SOURCE: Patient [ )   Family [ ]    Medical Record (X), EMR, RN, minimal visitation with +COVID 19    DIET: Consistent carbohydrate, DASH/TLC    Unable to speak with pt via telephone 2/2 aphasia & periods of confusion. Pt noted tolerating diet with 100% po intake per nursing documentation. Pt seen by CDE today for hyperglycemia (A1c 10.0% on 4/17). Started on Metformin + continues on Lantus (30 units) and Humalog (5 units pre-meal). Unable to provide diet education at this time. Last BM noted on 4/30.     CURRENT WEIGHT:   (4/25) 165.5lbs     PERTINENT MEDS:   Pertinent Medications: MEDICATIONS  (STANDING):  amLODIPine   Tablet 5 milliGRAM(s) Oral daily  aspirin enteric coated 81 milliGRAM(s) Oral daily  atorvastatin 10 milliGRAM(s) Oral at bedtime  BACItracin   Ointment 1 Application(s) Topical daily  benzonatate 100 milliGRAM(s) Oral every 8 hours  dextrose 5%. 1000 milliLiter(s) (50 mL/Hr) IV Continuous <Continuous>  dextrose 50% Injectable 12.5 Gram(s) IV Push once  dextrose 50% Injectable 25 Gram(s) IV Push once  dextrose 50% Injectable 25 Gram(s) IV Push once  enoxaparin Injectable 70 milliGRAM(s) SubCutaneous every 12 hours  famotidine    Tablet 20 milliGRAM(s) Oral daily  FLUoxetine 10 milliGRAM(s) Oral daily  gabapentin 300 milliGRAM(s) Oral at bedtime  hydrALAZINE 50 milliGRAM(s) Oral daily  insulin glargine Injectable (LANTUS) 30 Unit(s) SubCutaneous at bedtime  insulin lispro (HumaLOG) corrective regimen sliding scale   SubCutaneous three times a day before meals  insulin lispro (HumaLOG) corrective regimen sliding scale   SubCutaneous at bedtime  insulin lispro Injectable (HumaLOG) 5 Unit(s) SubCutaneous before dinner  lidocaine   Patch 1 Patch Transdermal daily  melatonin 3 milliGRAM(s) Oral at bedtime  metFORMIN 500 milliGRAM(s) Oral two times a day with meals  metoprolol tartrate 25 milliGRAM(s) Oral every 12 hours  sodium chloride 0.65% Nasal 1 Spray(s) Both Nostrils four times a day    MEDICATIONS  (PRN):  acetaminophen   Tablet .. 650 milliGRAM(s) Oral every 6 hours PRN Temp greater or equal to 38C (100.4F), Mild Pain (1 - 3), Moderate Pain (4 - 6)  dextrose 40% Gel 15 Gram(s) Oral once PRN Blood Glucose LESS THAN 70 milliGRAM(s)/deciliter  glucagon  Injectable 1 milliGRAM(s) IntraMuscular once PRN Glucose LESS THAN 70 milligrams/deciliter  lactulose Syrup 10 Gram(s) Oral daily PRN constipation      PERTINENT LABS:  04-30 Na135 mmol/L Glu 225 mg/dL<H> K+ 5.0 mmol/L Cr  0.89 mg/dL BUN 30 mg/dL<H> 04-30 Alb 3.2 g/dL<L> 04-17 Chol 103 mg/dL<L> LDL 40 mg/dL HDL 34 mg/dL<L> Trig 146 mg/dL    POCT (4/30) 168-317, (5/01) 262, 288mg/dl     SKIN:  no pressure ulcers, abrasion on lower back  EDEMA: none  LAST BM: 4/30    ESTIMATED NEEDS:   [X] no change since previous assessment  [ ] recalculated:     PREVIOUS NUTRITION DIAGNOSIS:    1. Inadequate oral intake: IMPROVING. Pt now consuming 100% of meals.     NUTRITION DIAGNOSIS is :  (X)  Ongoing      NEW NUTRITION DIAGNOSIS: altered nutrition related lab values related to endocrine dysfunction as evidenced by glucose > 200mg/dl, A1c 10.0%    NUTRITION RECOMMENDATIONS:   1. Continue current nutrition plan of care: consistent carbohydrate, DASH/TLC  2. Diet education to pt's family prior to d/c   3. CDE following for medical management of DMII  4. Weekly weights    MONITORING AND EVALUATION:   1. Tolerance to diet prescription   2. PO intake  3. Weights  4. Labs  5. Follow Up per protocol     RD to remain available   Eve Little RDN   Pager #[phone]

## 2020-05-01 NOTE — PROGRESS NOTE ADULT - ASSESSMENT
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  - fall and aspiration precautions  - cont prozac for motor recovery  -right SAFO evalution with liner  -PRECAUTIONS: airborne, contact, aspiration, cardiac, fall    #right knee pain: anterior and suprapatellar effusion  -currently on therapeutic lovenox  -right knee effusion, likely due to instability and quad/ham weakness. Cold compress, ACE wrap PRN  -add lidoderm patch for pain daily  Doppler negative DVT   -Xray right knee  pending  -knee immobilizer during standing activities (buckling noted during OT)    #Acute Hypoxic Respiratory Failure secondary to COVID-19 PNA  Date of COVID testin20  - Hydroxychloroquine not given, pt with prolonged QTc  - EKG - QTc: 500 (), incomplete RBBB  - Incentive spirometry, robitussin DM, ocean spray  -O2 via NC 1-2 l keep O2 sat > 94%    #labs:  leukocytosis 12.3--> resolved 8.65   CBC -->10.29   CBC     #HTN  - Lopressor 25 q12, amlodipine 5 qd, hydralazine 50mg qd  - BP controlled     #type II Mobitz block, known RBBB  - s/p Medtronic Micra AV+ leadless pacemaker  - outpt EP followup    #T2DM.  - SSI ACHS  - FS still uncontrolled 5.1  -diabetic educator consult appreciated :  ·	Start Metformin 500 mg w/ breakfast and dinner  ·	will decrease insulin doses to avoid potential hypoglycemia.  Will adjust up or down as needed in response to metformin  ·	Decrease Glargine from 37 to 30 units at bedtime  ·	Decrease Lispro from 10 to 5  units three times a day before meals w/ medium correction.    #HLD  - cont simvastatin 20 qhs    #mood/sleep  - Prozac 10  - melatonin 3    #Pain Mgmt   - Tylenol PRN    #GI/Bowel Mgmt   - Continent  - pepcid    #/Bladder Mgmt   - Continent, PVR    #FEN   - Diet - Regular + Thins  [CCHO]  DASH/TLC  -BUn/Cr 30/0.89  encourage po fluids  BMp 5/2,     #skin:  abrasion back. bacitracin and DSD daily     #- DVT PPX  : Lovenox 70mg Q12h    #Case discussed in IDT rounds :  -patient requires min assist UE/mod LE, trasnfers max assist. max assist toileting. Noted to have deficitsi n problem-solving, cognition independent of aphasia  -goals: CG bADLs, mod assist iADLs  -target dc home  with caregiver support and home Pt and OT, SLP.  -will need caregiver training    #LABS  right knee Xray  BMp   CBC, BMP   monitor FS ASSESSMENT/PLAN  SHIREEN CASH is a 68yo Female with HTN, HLD, T2DM, hx CVA, GERD, known RBBB with COVID-19 debility and Left frontal and parietla CVA with residual right HP, foot drop, patricio inattention aphasia    #Left frontal/parietal CVA  - continue  Comprehensive Rehab Program of PT/OT/SLP  - cont ASA, lovenox therapeutic dose as may be hypercoagulable due to COVID.  - fall and aspiration precautions  - cont prozac for motor recovery  -right SAFO evalution with liner  -PRECAUTIONS: airborne, contact, aspiration, cardiac, fall    #right knee pain: anterior and suprapatellar effusion  -currently on therapeutic lovenox  -right knee effusion, likely due to instability and quad/ham weakness. Cold compress, ACE wrap PRN  -add lidoderm patch for pain daily  Doppler negative DVT   -Xray right knee  pending  -knee immobilizer during standing activities (buckling noted during OT)    #Chest pain  overnight (Resolved)   -EKG and trop no signs of ACS     #Acute Hypoxic Respiratory Failure secondary to COVID-19 PNA  Date of COVID testin20  - Hydroxychloroquine not given, pt with prolonged QTc  - EKG - QTc: 500 (), incomplete RBBB  - Incentive spirometry, robitussin DM, ocean spray  -O2 via NC 1-2 l keep O2 sat > 94%    #labs:  leukocytosis 12.3--> resolved 8.65   CBC -->10.29   CBC     #HTN  - Lopressor 25 q12, amlodipine 5 qd, hydralazine 50mg qd  - BP controlled     #type II Mobitz block, known RBBB  - s/p Medtronic Micra AV+ leadless pacemaker  - outpt EP followup    #T2DM.  - SSI ACHS  - FS still uncontrolled 5.1  -diabetic educator consult appreciated :  ·	Start Metformin 500 mg w/ breakfast and dinner  ·	will decrease insulin doses to avoid potential hypoglycemia.  Will adjust up or down as needed in response to metformin  ·	Decrease Glargine from 37 to 30 units at bedtime  ·	Decrease Lispro from 10 to 5  units three times a day before meals w/ medium correction.    #HLD  - cont simvastatin 20 qhs    #mood/sleep  - Prozac 10  - melatonin 3    #Pain Mgmt   - Tylenol PRN    #GI/Bowel Mgmt   - Continent  - pepcid    #/Bladder Mgmt   - Continent, PVR    #FEN   - Diet - Regular + Thins  [CCHO]  DASH/TLC  -BUn/Cr 30/0.89  encourage po fluids  BMp 5/2, 5    #skin:  abrasion back. bacitracin and DSD daily     #- DVT PPX  : Lovenox 70mg Q12h    #Case discussed in IDT rounds :  -patient requires min assist UE/mod LE, trasnfers max assist. max assist toileting. Noted to have deficitsi n problem-solving, cognition independent of aphasia  -goals: CG bADLs, mod assist iADLs  -target dc home  with caregiver support and home Pt and OT, SLP.  -will need caregiver training    #LABS  right knee Xray  BMp 5/  CBC, BMP   monitor FS ASSESSMENT/PLAN  SHIREEN CASH is a 68yo Female with HTN, HLD, T2DM, hx CVA, GERD, known RBBB with COVID-19 debility and Left frontal and parietla CVA with residual right HP, foot drop, patricio inattention aphasia    #Left frontal/parietal CVA  - continue  Comprehensive Rehab Program of PT/OT/SLP  - cont ASA, lovenox therapeutic dose as may be hypercoagulable due to COVID.  - fall and aspiration precautions  - cont prozac for motor recovery  -right SAFO evalution with liner  -PRECAUTIONS: airborne, contact, aspiration, cardiac, fall    #right knee pain: anterior and suprapatellar effusion  -currently on therapeutic lovenox  -right knee effusion, likely due to instability and quad/ham weakness. Cold compress, ACE wrap PRN  -add lidoderm patch for pain daily  Doppler negative DVT   -Xray right knee  pending  -knee immobilizer during standing activities (buckling noted during OT)      #Acute Hypoxic Respiratory Failure secondary to COVID-19 PNA  Date of COVID testin20  - Hydroxychloroquine not given, pt with prolonged QTc  - EKG - QTc: 500 (), incomplete RBBB  - Incentive spirometry, robitussin DM, ocean spray  -O2 via NC 1-2 l keep O2 sat > 94%    #labs:  leukocytosis 12.3--> resolved 8.65   CBC -->10.29   CBC     #HTN  - Lopressor 25 q12, amlodipine 5 qd, hydralazine 50mg qd  - BP controlled     #type II Mobitz block, known RBBB  - s/p Medtronic Micra AV+ leadless pacemaker  - outpt EP followup    #T2DM.  - SSI ACHS  - FS still uncontrolled 5.1  -diabetic educator consult appreciated :  ·	Start Metformin 500 mg w/ breakfast and dinner  ·	will decrease insulin doses to avoid potential hypoglycemia.  Will adjust up or down as needed in response to metformin  ·	Decrease Glargine from 37 to 30 units at bedtime  ·	Decrease Lispro from 10 to 5  units three times a day before meals w/ medium correction.    #HLD  - cont simvastatin 20 qhs    #mood/sleep  - Prozac 10  - melatonin 3    #Pain Mgmt   - Tylenol PRN    #GI/Bowel Mgmt   - Continent  - pepcid    #/Bladder Mgmt   - Continent, PVR    #FEN   - Diet - Regular + Thins  [CCHO]  DASH/TLC  -BUn/Cr 30/0.89  encourage po fluids  BMp 5/2,     #skin:  abrasion back. bacitracin and DSD daily     #- DVT PPX  : Lovenox 70mg Q12h    #Case discussed in IDT rounds :  -patient requires min assist UE/mod LE, trasnfers max assist. max assist toileting. Noted to have deficitsi n problem-solving, cognition independent of aphasia  -goals: CG bADLs, mod assist iADLs  -target dc home  with caregiver support and home Pt and OT, SLP.  -will need caregiver training    #LABS  right knee Xray  BMp   CBC, BMP   monitor FS

## 2020-05-01 NOTE — PROGRESS NOTE ADULT - COMMENTS
Patient seen with language line  Abner ibanez at 10:15 AM. Patient reports occasional RUE pain, points to triceps but does not look distressed with PROM or palpation. continues to have right knee pain with movement and standing. Had increased standing activities yesterday in therapy. No fever or cough in AM

## 2020-05-01 NOTE — PROGRESS NOTE ADULT - SUBJECTIVE AND OBJECTIVE BOX
seen and examined at bedside     no co   sitting up in chair     ros all others are neg     ICU Vital Signs Last 24 Hrs  T(C): 36.6 (01 May 2020 09:14), Max: 36.7 (30 Apr 2020 20:24)  T(F): 97.8 (01 May 2020 09:14), Max: 98.1 (30 Apr 2020 20:24)  HR: 69 (01 May 2020 09:14) (65 - 72)  BP: 124/66 (01 May 2020 09:14) (124/66 - 151/72)  BP(mean): --  ABP: --  ABP(mean): --  RR: 16 (01 May 2020 09:14) (16 - 17)  SpO2: 100% (01 May 2020 09:14) (100% - 100%)                          10.6   10.29 )-----------( 401      ( 30 Apr 2020 06:30 )             32.8     04-30    135  |  100  |  30<H>  ----------------------------<  225<H>  5.0   |  28  |  0.89    Ca    9.4      30 Apr 2020 06:30    TPro  7.1  /  Alb  3.2<L>  /  TBili  0.4  /  DBili  x   /  AST  19  /  ALT  25  /  AlkPhos  63  04-30    MEDICATIONS  (STANDING):  amLODIPine   Tablet 5 milliGRAM(s) Oral daily  aspirin enteric coated 81 milliGRAM(s) Oral daily  atorvastatin 10 milliGRAM(s) Oral at bedtime  BACItracin   Ointment 1 Application(s) Topical daily  benzonatate 100 milliGRAM(s) Oral every 8 hours  dextrose 5%. 1000 milliLiter(s) (50 mL/Hr) IV Continuous <Continuous>  dextrose 50% Injectable 12.5 Gram(s) IV Push once  dextrose 50% Injectable 25 Gram(s) IV Push once  dextrose 50% Injectable 25 Gram(s) IV Push once  enoxaparin Injectable 70 milliGRAM(s) SubCutaneous every 12 hours  famotidine    Tablet 20 milliGRAM(s) Oral daily  FLUoxetine 10 milliGRAM(s) Oral daily  gabapentin 300 milliGRAM(s) Oral at bedtime  hydrALAZINE 50 milliGRAM(s) Oral daily  insulin glargine Injectable (LANTUS) 30 Unit(s) SubCutaneous at bedtime  insulin lispro (HumaLOG) corrective regimen sliding scale   SubCutaneous three times a day before meals  insulin lispro (HumaLOG) corrective regimen sliding scale   SubCutaneous at bedtime  insulin lispro Injectable (HumaLOG) 5 Unit(s) SubCutaneous before dinner  lidocaine   Patch 1 Patch Transdermal daily  melatonin 3 milliGRAM(s) Oral at bedtime  metFORMIN 500 milliGRAM(s) Oral two times a day with meals  metoprolol tartrate 25 milliGRAM(s) Oral every 12 hours  sodium chloride 0.65% Nasal 1 Spray(s) Both Nostrils four times a day    MEDICATIONS  (STANDING):  amLODIPine   Tablet 5 milliGRAM(s) Oral daily  aspirin enteric coated 81 milliGRAM(s) Oral daily  atorvastatin 10 milliGRAM(s) Oral at bedtime  BACItracin   Ointment 1 Application(s) Topical daily  benzonatate 100 milliGRAM(s) Oral every 8 hours  dextrose 5%. 1000 milliLiter(s) (50 mL/Hr) IV Continuous <Continuous>  dextrose 50% Injectable 12.5 Gram(s) IV Push once  dextrose 50% Injectable 25 Gram(s) IV Push once  dextrose 50% Injectable 25 Gram(s) IV Push once  enoxaparin Injectable 70 milliGRAM(s) SubCutaneous every 12 hours  famotidine    Tablet 20 milliGRAM(s) Oral daily  FLUoxetine 10 milliGRAM(s) Oral daily  gabapentin 300 milliGRAM(s) Oral at bedtime  hydrALAZINE 50 milliGRAM(s) Oral daily  insulin glargine Injectable (LANTUS) 30 Unit(s) SubCutaneous at bedtime  insulin lispro (HumaLOG) corrective regimen sliding scale   SubCutaneous three times a day before meals  insulin lispro (HumaLOG) corrective regimen sliding scale   SubCutaneous at bedtime  insulin lispro Injectable (HumaLOG) 5 Unit(s) SubCutaneous before dinner  lidocaine   Patch 1 Patch Transdermal daily  melatonin 3 milliGRAM(s) Oral at bedtime  metFORMIN 500 milliGRAM(s) Oral two times a day with meals  metoprolol tartrate 25 milliGRAM(s) Oral every 12 hours  sodium chloride 0.65% Nasal 1 Spray(s) Both Nostrils four times a day    MEDICATIONS  (PRN):  acetaminophen   Tablet .. 650 milliGRAM(s) Oral every 6 hours PRN Temp greater or equal to 38C (100.4F), Mild Pain (1 - 3), Moderate Pain (4 - 6)  dextrose 40% Gel 15 Gram(s) Oral once PRN Blood Glucose LESS THAN 70 milliGRAM(s)/deciliter  glucagon  Injectable 1 milliGRAM(s) IntraMuscular once PRN Glucose LESS THAN 70 milligrams/deciliter  lactulose Syrup 10 Gram(s) Oral daily PRN constipation

## 2020-05-01 NOTE — CONSULT NOTE ADULT - SUBJECTIVE AND OBJECTIVE BOX
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  Information obtained from son Balaji [phone] No  needed    Type 2 Diabetes 30 years as per son.  hospitals pt was taking Novolin N 40 units am and 30 units at bedtime via vial/syringe.  hospitals pt was never taking Novolin L (has not been in existence for 10+ yrs).  Would miss doses of Novolin N approximately 1-2 x week.  hospitals pt was not taking any other oral medication or injectables for diabetes    Tests BG at home daily as per son.  Son unsure of readings    Previous A1C 10.2% 6/25/2017    Episodes of Hypoglycemia:  denies    Outpatient Endocrinologist?  No.  Diabetes managed by PCP Dr. Mayo    PAST MEDICAL & SURGICAL HISTORY:  H/O gastroesophageal reflux (GERD)  HTN (hypertension)  DM (diabetes mellitus)  History of benign brain tumor    FAMILY HISTORY:  FH: asthma: Son      SH:  Smoking denies  Etoh: denies  Recreational Drugs:  denies    Home Medications:  acetaminophen 325 mg oral tablet: 2 tab(s) orally every 6 hours, As needed, Temp greater or equal to 38C (100.4F), Mild Pain (1 - 3), Moderate Pain (4 - 6) (01 May 2020 11:06)  amLODIPine 5 mg oral tablet: 1 tab(s) orally once a day (01 May 2020 11:06)  aspirin 81 mg oral delayed release tablet: 1 tab(s) orally once a day (01 May 2020 11:06)  enoxaparin: 70 milligram(s) subcutaneous 2 times a day (01 May 2020 11:06)  famotidine 20 mg oral tablet: 1 tab(s) orally once a day (01 May 2020 11:06)  FLUoxetine 10 mg oral capsule: 1 cap(s) orally once a day (01 May 2020 11:06)  hydrALAZINE 50 mg oral tablet: 1 tab(s) orally once a day (01 May 2020 11:06)  hydroCHLOROthiazide 25 mg oral tablet: 1 tab(s) orally once a day (01 May 2020 11:06)  insulin glargine: 25 unit(s) subcutaneous once a day (at bedtime) (01 May 2020 11:06)  melatonin 5 mg oral tablet: 1 tab(s) orally once a day (at bedtime) (01 May 2020 11:06)  metoprolol tartrate 25 mg oral tablet: 1 tab(s) orally every 12 hours (01 May 2020 11:06)  simvastatin 20 mg oral tablet: 1 tab(s) orally once a day (at bedtime) (01 May 2020 11:06)      Current (Non-Endocrine) Meds:  acetaminophen   Tablet .. 650 milliGRAM(s) Oral every 6 hours PRN  amLODIPine   Tablet 5 milliGRAM(s) Oral daily  aspirin enteric coated 81 milliGRAM(s) Oral daily  BACItracin   Ointment 1 Application(s) Topical daily  benzonatate 100 milliGRAM(s) Oral every 8 hours  dextrose 5%. 1000 milliLiter(s) IV Continuous <Continuous>  enoxaparin Injectable 70 milliGRAM(s) SubCutaneous every 12 hours  famotidine    Tablet 20 milliGRAM(s) Oral daily  FLUoxetine 10 milliGRAM(s) Oral daily  gabapentin 300 milliGRAM(s) Oral at bedtime  hydrALAZINE 50 milliGRAM(s) Oral daily  lactulose Syrup 10 Gram(s) Oral daily PRN  lidocaine   Patch 1 Patch Transdermal daily  melatonin 3 milliGRAM(s) Oral at bedtime  metoprolol tartrate 25 milliGRAM(s) Oral every 12 hours  sodium chloride 0.65% Nasal 1 Spray(s) Both Nostrils four times a day      Current Endocrine Meds:   atorvastatin 10 milliGRAM(s) Oral at bedtime  dextrose 40% Gel 15 Gram(s) Oral once PRN  dextrose 50% Injectable 12.5 Gram(s) IV Push once  dextrose 50% Injectable 25 Gram(s) IV Push once  dextrose 50% Injectable 25 Gram(s) IV Push once  glucagon  Injectable 1 milliGRAM(s) IntraMuscular once PRN  insulin glargine Injectable (LANTUS) 42 Unit(s) SubCutaneous at bedtime  insulin lispro (HumaLOG) corrective regimen sliding scale   SubCutaneous three times a day before meals  insulin lispro (HumaLOG) corrective regimen sliding scale   SubCutaneous at bedtime  insulin lispro Injectable (HumaLOG) 10 Unit(s) SubCutaneous three times a day before meals      History of Medication Compliance:    Allergies:  No Known Allergies      Height, weight BMI    Weight (kg): 75.1 (04-25 @ 04:55)    Vital Signs Last 24 Hrs  T(C): 36.6 (01 May 2020 09:14), Max: 36.7 (30 Apr 2020 20:24)  T(F): 97.8 (01 May 2020 09:14), Max: 98.1 (30 Apr 2020 20:24)  HR: 69 (01 May 2020 09:14) (65 - 72)  BP: 124/66 (01 May 2020 09:14) (124/66 - 151/72)  BP(mean): --  RR: 16 (01 May 2020 09:14) (16 - 17)  SpO2: 100% (01 May 2020 09:14) (100% - 100%)      POCT Blood Glucose.: 263 mg/dL (05-01-20 @ 08:49)  POCT Blood Glucose.: 168 mg/dL (04-30-20 @ 21:36)  POCT Blood Glucose.: 184 mg/dL (04-30-20 @ 16:14)  POCT Blood Glucose.: 317 mg/dL (04-30-20 @ 11:07)  POCT Blood Glucose.: 253 mg/dL (04-30-20 @ 08:19)  POCT Blood Glucose.: 241 mg/dL (04-29-20 @ 20:56)  POCT Blood Glucose.: 169 mg/dL (04-29-20 @ 17:20)  POCT Blood Glucose.: 260 mg/dL (04-29-20 @ 12:12)  POCT Blood Glucose.: 276 mg/dL (04-29-20 @ 07:45)  POCT Blood Glucose.: 224 mg/dL (04-28-20 @ 21:03)  POCT Blood Glucose.: 259 mg/dL (04-28-20 @ 16:56)  POCT Blood Glucose.: 346 mg/dL (04-28-20 @ 11:43)    LABS:                        10.6   10.29 )-----------( 401      ( 30 Apr 2020 06:30 )             32.8     04-30    135  |  100  |  30<H>  ----------------------------<  225<H>  5.0   |  28  |  0.89    Ca    9.4      30 Apr 2020 06:30    TPro  7.1  /  Alb  3.2<L>  /  TBili  0.4  /  DBili  x   /  AST  19  /  ALT  25  /  AlkPhos  63  04-30    Thyroid Stimulating Hormone, Serum: 1.20 uIU/mL (04-09-20 @ 07:01)                             Spoke with son Balaji via telephone.  Son very willing to assist in patients care and willing to learn insulin pen.  States sister lives very close by and is also willing to help.  Educated on impact of current A1C on long and short term complications of Diabetes and importance of controlling BG.  States normally no one is home with patient during the day.  Will need to assess regime based on this and BG patterns.  Will connect w/ son for insulin instruction once plan is set closer to discharge    Recommendations:   For now:  Start Metformin 500 mg w/ breakfast and dinner  will decrease insulin doses to avoid potential hypoglycemia.  Will adjust up or down as needed in response to metformin  Decrease Glargine from 37 to 30 units at bedtime  Decrease Lispro from 10 to 5  units three times a day before meals w/ medium correction.    Will continue to monitor       At discharge, recommend:  TBD.  Once dose of long acting insulin and max dose of metformin if tolerated to reduce pt and family burden while avoiding hypo and hyperglycemia.  Discussed possibility of getting continuous glucose monitor after discharge.        Pt can follow up at discharge with:

## 2020-05-01 NOTE — PROGRESS NOTE ADULT - ASSESSMENT
68yo Female with HTN, HLD, T2DM, hx CVA, GERD, known RBBB with COVID-19 debility and Left frontal and parietla CVA with residual right HP, foot drop, patricio inattention aphasia- pt/ot/dvt ppx  - cont ASA, lovenox therapeutic dose as may be hypercoagulable due to COVID.  - cont prozac for motor recovery  -right SAFO evalution with liner      #right calf pain  doppler RLE pending  -currently on therapeutic lovenox    #Acute Hypoxic Respiratory Failure secondary to COVID-19 PNA  Date of COVID testin20  - Hydroxychloroquine not given, pt with prolonged QTc  - EKG - QTc: 500 (), incomplete RBBB  - Incentive spirometry, robitussin DM, ocean spray  -O2 support    #HTN  - Lopressor  amlodipine  hydralazine       #type II Mobitz block, known RBBB  - s/p Medtronic Micra AV+ leadless pacemaker  - outpt EP followup    #T2DM.  - increase 37 units qhs, c/w Humalog  8 units tid  - iss, accuchecks    #HLD  - cont simvastatin 20 qhs    #mood/sleep  - Prozac 10  - melatonin 3    #- DVT PPX  : Lovenox 70mg Q12h    will follow  d/w dr. dutton 68yo Female with HTN, HLD, T2DM, hx CVA, GERD, known RBBB with COVID-19 debility and Left frontal and parietla CVA with residual right HP, foot drop, patricio inattention aphasia- pt/ot/dvt ppx  - cont ASA, lovenox therapeutic dose as may be hypercoagulable due to COVID. (neg pfo)   - cont prozac for motor recovery  -right SAFO evalution with liner      #right calf pain resolved   doppler RLE negative   -currently on therapeutic lovenox    #Acute Hypoxic Respiratory Failure secondary to COVID-19 PNA  Date of COVID testin20 resolving   keep 02 sat approx 94%   monitor rr pattern    #HTN controlled  - Lopressor  amlodipine  hydralazine       #type II Mobitz block, known RBBB  - s/p Medtronic Micra AV+ leadless pacemaker  - outpt EP followup    #T2DM controlled now   continue the current lantus dosing   - iss, accuchecks    #HLD  - cont simvastatin 20 qhs    #mood/sleep controlled  - Prozac 10  - melatonin 3    #- DVT PPX  : Lovenox 70mg Q12h    Reviewed w patient plan of care

## 2020-05-02 ENCOUNTER — TRANSCRIPTION ENCOUNTER (OUTPATIENT)
Age: 70
End: 2020-05-02

## 2020-05-02 LAB
ALBUMIN SERPL ELPH-MCNC: 3.2 G/DL — LOW (ref 3.3–5)
ALP SERPL-CCNC: 72 U/L — SIGNIFICANT CHANGE UP (ref 40–120)
ALT FLD-CCNC: 23 U/L — SIGNIFICANT CHANGE UP (ref 10–45)
ANION GAP SERPL CALC-SCNC: 4 MMOL/L — LOW (ref 5–17)
ANION GAP SERPL CALC-SCNC: 9 MMOL/L — SIGNIFICANT CHANGE UP (ref 5–17)
AST SERPL-CCNC: 17 U/L — SIGNIFICANT CHANGE UP (ref 10–40)
BASOPHILS # BLD AUTO: 0.09 K/UL — SIGNIFICANT CHANGE UP (ref 0–0.2)
BASOPHILS NFR BLD AUTO: 0.9 % — SIGNIFICANT CHANGE UP (ref 0–2)
BILIRUB SERPL-MCNC: 0.3 MG/DL — SIGNIFICANT CHANGE UP (ref 0.2–1.2)
BUN SERPL-MCNC: 24 MG/DL — HIGH (ref 7–23)
BUN SERPL-MCNC: 24 MG/DL — HIGH (ref 7–23)
CALCIUM SERPL-MCNC: 9.4 MG/DL — SIGNIFICANT CHANGE UP (ref 8.4–10.5)
CALCIUM SERPL-MCNC: 9.7 MG/DL — SIGNIFICANT CHANGE UP (ref 8.4–10.5)
CHLORIDE SERPL-SCNC: 100 MMOL/L — SIGNIFICANT CHANGE UP (ref 96–108)
CHLORIDE SERPL-SCNC: 100 MMOL/L — SIGNIFICANT CHANGE UP (ref 96–108)
CK MB CFR SERPL CALC: <0.5 NG/ML — LOW (ref 0.5–10)
CK SERPL-CCNC: 36 U/L — SIGNIFICANT CHANGE UP (ref 25–170)
CK SERPL-CCNC: 39 U/L — SIGNIFICANT CHANGE UP (ref 25–170)
CO2 SERPL-SCNC: 27 MMOL/L — SIGNIFICANT CHANGE UP (ref 22–31)
CO2 SERPL-SCNC: 33 MMOL/L — HIGH (ref 22–31)
CREAT SERPL-MCNC: 0.77 MG/DL — SIGNIFICANT CHANGE UP (ref 0.5–1.3)
CREAT SERPL-MCNC: 0.79 MG/DL — SIGNIFICANT CHANGE UP (ref 0.5–1.3)
EOSINOPHIL # BLD AUTO: 0.39 K/UL — SIGNIFICANT CHANGE UP (ref 0–0.5)
EOSINOPHIL NFR BLD AUTO: 3.7 % — SIGNIFICANT CHANGE UP (ref 0–6)
GLUCOSE SERPL-MCNC: 176 MG/DL — HIGH (ref 70–99)
GLUCOSE SERPL-MCNC: 206 MG/DL — HIGH (ref 70–99)
HCT VFR BLD CALC: 31.1 % — LOW (ref 34.5–45)
HGB BLD-MCNC: 10.2 G/DL — LOW (ref 11.5–15.5)
IMM GRANULOCYTES NFR BLD AUTO: 1.2 % — SIGNIFICANT CHANGE UP (ref 0–1.5)
LYMPHOCYTES # BLD AUTO: 2.55 K/UL — SIGNIFICANT CHANGE UP (ref 1–3.3)
LYMPHOCYTES # BLD AUTO: 24.4 % — SIGNIFICANT CHANGE UP (ref 13–44)
MAGNESIUM SERPL-MCNC: 2 MG/DL — SIGNIFICANT CHANGE UP (ref 1.6–2.6)
MCHC RBC-ENTMCNC: 28.7 PG — SIGNIFICANT CHANGE UP (ref 27–34)
MCHC RBC-ENTMCNC: 32.8 GM/DL — SIGNIFICANT CHANGE UP (ref 32–36)
MCV RBC AUTO: 87.4 FL — SIGNIFICANT CHANGE UP (ref 80–100)
MONOCYTES # BLD AUTO: 1.23 K/UL — HIGH (ref 0–0.9)
MONOCYTES NFR BLD AUTO: 11.8 % — SIGNIFICANT CHANGE UP (ref 2–14)
NEUTROPHILS # BLD AUTO: 6.07 K/UL — SIGNIFICANT CHANGE UP (ref 1.8–7.4)
NEUTROPHILS NFR BLD AUTO: 58 % — SIGNIFICANT CHANGE UP (ref 43–77)
NRBC # BLD: 0 /100 WBCS — SIGNIFICANT CHANGE UP (ref 0–0)
PHOSPHATE SERPL-MCNC: 3.7 MG/DL — SIGNIFICANT CHANGE UP (ref 2.5–4.5)
PLATELET # BLD AUTO: 348 K/UL — SIGNIFICANT CHANGE UP (ref 150–400)
POTASSIUM SERPL-MCNC: 4.3 MMOL/L — SIGNIFICANT CHANGE UP (ref 3.5–5.3)
POTASSIUM SERPL-MCNC: 4.9 MMOL/L — SIGNIFICANT CHANGE UP (ref 3.5–5.3)
POTASSIUM SERPL-SCNC: 4.3 MMOL/L — SIGNIFICANT CHANGE UP (ref 3.5–5.3)
POTASSIUM SERPL-SCNC: 4.9 MMOL/L — SIGNIFICANT CHANGE UP (ref 3.5–5.3)
PROT SERPL-MCNC: 6.9 G/DL — SIGNIFICANT CHANGE UP (ref 6–8.3)
RBC # BLD: 3.56 M/UL — LOW (ref 3.8–5.2)
RBC # FLD: 15 % — HIGH (ref 10.3–14.5)
SODIUM SERPL-SCNC: 136 MMOL/L — SIGNIFICANT CHANGE UP (ref 135–145)
SODIUM SERPL-SCNC: 137 MMOL/L — SIGNIFICANT CHANGE UP (ref 135–145)
TROPONIN I SERPL-MCNC: <.017 NG/ML — LOW (ref 0.02–0.06)
WBC # BLD: 10.46 K/UL — SIGNIFICANT CHANGE UP (ref 3.8–10.5)
WBC # FLD AUTO: 10.46 K/UL — SIGNIFICANT CHANGE UP (ref 3.8–10.5)

## 2020-05-02 PROCEDURE — 99233 SBSQ HOSP IP/OBS HIGH 50: CPT

## 2020-05-02 PROCEDURE — 99232 SBSQ HOSP IP/OBS MODERATE 35: CPT | Mod: GC

## 2020-05-02 PROCEDURE — 93010 ELECTROCARDIOGRAM REPORT: CPT

## 2020-05-02 RX ADMIN — Medication 1 SPRAY(S): at 00:53

## 2020-05-02 RX ADMIN — GABAPENTIN 300 MILLIGRAM(S): 400 CAPSULE ORAL at 22:03

## 2020-05-02 RX ADMIN — Medication 1 APPLICATION(S): at 10:59

## 2020-05-02 RX ADMIN — Medication 2: at 08:14

## 2020-05-02 RX ADMIN — Medication 25 MILLIGRAM(S): at 17:58

## 2020-05-02 RX ADMIN — ENOXAPARIN SODIUM 70 MILLIGRAM(S): 100 INJECTION SUBCUTANEOUS at 17:58

## 2020-05-02 RX ADMIN — FAMOTIDINE 20 MILLIGRAM(S): 10 INJECTION INTRAVENOUS at 12:54

## 2020-05-02 RX ADMIN — METFORMIN HYDROCHLORIDE 500 MILLIGRAM(S): 850 TABLET ORAL at 09:51

## 2020-05-02 RX ADMIN — Medication 6: at 12:55

## 2020-05-02 RX ADMIN — Medication 4: at 17:23

## 2020-05-02 RX ADMIN — Medication 25 MILLIGRAM(S): at 06:30

## 2020-05-02 RX ADMIN — Medication 650 MILLIGRAM(S): at 00:45

## 2020-05-02 RX ADMIN — Medication 100 MILLIGRAM(S): at 22:03

## 2020-05-02 RX ADMIN — Medication 100 MILLIGRAM(S): at 12:55

## 2020-05-02 RX ADMIN — AMLODIPINE BESYLATE 5 MILLIGRAM(S): 2.5 TABLET ORAL at 06:29

## 2020-05-02 RX ADMIN — LIDOCAINE 1 PATCH: 4 CREAM TOPICAL at 12:55

## 2020-05-02 RX ADMIN — Medication 1 SPRAY(S): at 06:30

## 2020-05-02 RX ADMIN — LIDOCAINE 1 PATCH: 4 CREAM TOPICAL at 00:56

## 2020-05-02 RX ADMIN — Medication 10 MILLIGRAM(S): at 12:55

## 2020-05-02 RX ADMIN — Medication 5 UNIT(S): at 17:24

## 2020-05-02 RX ADMIN — Medication 50 MILLIGRAM(S): at 06:29

## 2020-05-02 RX ADMIN — Medication 81 MILLIGRAM(S): at 12:54

## 2020-05-02 RX ADMIN — METFORMIN HYDROCHLORIDE 500 MILLIGRAM(S): 850 TABLET ORAL at 17:24

## 2020-05-02 RX ADMIN — Medication 1 SPRAY(S): at 17:58

## 2020-05-02 RX ADMIN — LIDOCAINE 1 PATCH: 4 CREAM TOPICAL at 19:25

## 2020-05-02 RX ADMIN — Medication 100 MILLIGRAM(S): at 06:30

## 2020-05-02 RX ADMIN — Medication 650 MILLIGRAM(S): at 17:08

## 2020-05-02 RX ADMIN — Medication 3 MILLIGRAM(S): at 22:10

## 2020-05-02 RX ADMIN — Medication 650 MILLIGRAM(S): at 10:00

## 2020-05-02 RX ADMIN — INSULIN GLARGINE 30 UNIT(S): 100 INJECTION, SOLUTION SUBCUTANEOUS at 22:09

## 2020-05-02 RX ADMIN — ENOXAPARIN SODIUM 70 MILLIGRAM(S): 100 INJECTION SUBCUTANEOUS at 06:30

## 2020-05-02 RX ADMIN — ATORVASTATIN CALCIUM 10 MILLIGRAM(S): 80 TABLET, FILM COATED ORAL at 22:02

## 2020-05-02 RX ADMIN — Medication 1 SPRAY(S): at 12:55

## 2020-05-02 NOTE — PROGRESS NOTE ADULT - ASSESSMENT
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  - fall and aspiration precautions  - cont prozac for motor recovery  -right SAFO evalution with liner  -PRECAUTIONS: airborne, contact, aspiration, cardiac, fall    #right knee pain: anterior and suprapatellar effusion  -currently on therapeutic lovenox  -right knee effusion, likely due to instability and quad/ham weakness. Cold compress, ACE wrap PRN  -add lidoderm patch for pain daily  Doppler negative DVT   -Xray right knee  negative for acute process   -knee immobilizer during standing activities (buckling noted during OT)  -Right calf pain with US of the LE done  negative for DVT       #Acute Hypoxic Respiratory Failure secondary to COVID-19 PNA  Date of COVID testin20  - Hydroxychloroquine not given, pt with prolonged QTc  - EKG - QTc: 500 (), incomplete RBBB  - Incentive spirometry, robitussin DM, ocean spray  -O2 via NC 1-2 l keep O2 sat > 94%    #labs:  leukocytosis 12.3--> resolved 8.65   CBC -->10.29   CBC     #HTN  - Lopressor 25 q12, amlodipine 5 qd, hydralazine 50mg qd    #type II Mobitz block, known RBBB  - s/p Medtronic Micra AV+ leadless pacemaker  - outpt EP followup    #T2DM.  - SSI ACHS  - FS better controlled today    -diabetic educator consult appreciated :  ·	Start Metformin 500 mg w/ breakfast and dinner  ·	will decrease insulin doses to avoid potential hypoglycemia.  Will adjust up or down as needed in response to metformin  ·	Decrease Glargine from 37 to 30 units at bedtime  ·	Decrease Lispro from 10 to 5  units three times a day before meals w/ medium correction.    #HLD  - cont simvastatin 20 qhs    #mood/sleep  - Prozac 10  - melatonin 3    #Pain Mgmt   - Tylenol PRN    #GI/Bowel Mgmt   - Continent  - pepcid    #/Bladder Mgmt   - Continent, PVR    #FEN   - Diet - Regular + Thins  [CCHO]  DASH/TLC  -BUn/Cr 30/0.89  encourage po fluids  / with downtrending BUN   BMP     #skin:  abrasion back. bacitracin and DSD daily     #- DVT PPX  : Lovenox 70mg Q12h    #Case discussed in IDT rounds :  -patient requires min assist UE/mod LE, trasnfers max assist. max assist toileting. Noted to have deficitsi n problem-solving, cognition independent of aphasia  -goals: CG bADLs, mod assist iADLs  -target dc home  with caregiver support and home Pt and OT, SLP.  -will need caregiver training      #LABS  CBC, BMP   monitor FS ASSESSMENT/PLAN  SHIREEN CASH is a 68yo Female with HTN, HLD, T2DM, hx CVA, GERD, known RBBB with COVID-19 debility and Left frontal and parietla CVA with residual right HP, foot drop, patricio inattention aphasia    #Left frontal/parietal CVA  - continue  Comprehensive Rehab Program of PT/OT/SLP  - cont ASA, lovenox therapeutic dose as may be hypercoagulable due to COVID.  - fall and aspiration precautions  - cont prozac for motor recovery  -right SAFO evalution with liner  -PRECAUTIONS: airborne, contact, aspiration, cardiac, fall    #right knee pain: anterior and suprapatellar effusion  -currently on therapeutic lovenox  -right knee effusion, likely due to instability and quad/ham weakness. Cold compress, ACE wrap PRN  -add lidoderm patch for pain daily  Doppler negative DVT   -Xray right knee  negative for acute process   -knee immobilizer during standing activities (buckling noted during OT)  -Right calf pain with US of the LE done  negative for DVT     #Chest pain  overnight (Resolved)   -EKG and trop no signs of ACS     #Acute Hypoxic Respiratory Failure secondary to COVID-19 PNA  Date of COVID testin20  - Hydroxychloroquine not given, pt with prolonged QTc  - EKG - QTc: 500 (), incomplete RBBB  - Incentive spirometry, robitussin DM, ocean spray  -O2 via NC 1-2 l keep O2 sat > 94%    #labs:  leukocytosis 12.3--> resolved 8.65   CBC -->10.29   CBC     #HTN  - Lopressor 25 q12, amlodipine 5 qd, hydralazine 50mg qd    #type II Mobitz block, known RBBB  - s/p Medtronic Micra AV+ leadless pacemaker  - outpt EP followup    #T2DM.  - SSI ACHS  - FS better controlled today    -diabetic educator consult appreciated :  ·	Start Metformin 500 mg w/ breakfast and dinner  ·	will decrease insulin doses to avoid potential hypoglycemia.  Will adjust up or down as needed in response to metformin  ·	Decrease Glargine from 37 to 30 units at bedtime  ·	Decrease Lispro from 10 to 5  units three times a day before meals w/ medium correction.    #HLD  - cont simvastatin 20 qhs    #mood/sleep  - Prozac 10  - melatonin 3    #Pain Mgmt   - Tylenol PRN    #GI/Bowel Mgmt   - Continent  - pepcid    #/Bladder Mgmt   - Continent, PVR    #FEN   - Diet - Regular + Thins  [CCHO]  DASH/TLC  -BUn/Cr 30/0.89  encourage po fluids  5/2 with downtrending BUN   BMP     #skin:  abrasion back. bacitracin and DSD daily     #- DVT PPX  : Lovenox 70mg Q12h    #Case discussed in IDT rounds :  -patient requires min assist UE/mod LE, trasnfers max assist. max assist toileting. Noted to have deficitsi n problem-solving, cognition independent of aphasia  -goals: CG bADLs, mod assist iADLs  -target dc home  with caregiver support and home Pt and OT, SLP.  -will need caregiver training      #LABS  CBC, BMP   monitor FS ASSESSMENT/PLAN  SHIREEN CASH is a 68yo Female with HTN, HLD, T2DM, hx CVA, GERD, known RBBB with COVID-19 debility and Left frontal and parietla CVA with residual right HP, foot drop, patricio inattention aphasia    #Left frontal/parietal CVA  - continue  Comprehensive Rehab Program of PT/OT/SLP  - cont ASA, lovenox therapeutic dose as may be hypercoagulable due to COVID.  - fall and aspiration precautions  - cont prozac for motor recovery  -right SAFO evalution with liner  -PRECAUTIONS: airborne, contact, aspiration, cardiac, fall    #right knee pain: anterior and suprapatellar effusion  -currently on therapeutic lovenox  -right knee effusion, likely due to instability and quad/ham weakness. Cold compress, ACE wrap PRN  -add lidoderm patch for pain daily  Doppler negative DVT   -Xray right knee  negative for acute process   -knee immobilizer during standing activities (buckling noted during OT)  -Right calf pain with US of the LE done  negative for DVT     #Chest pain  overnight (Resolved)   -EKG and trop no signs of ACS     #Acute Hypoxic Respiratory Failure secondary to COVID-19 PNA  Date of COVID testin20  - Hydroxychloroquine not given, pt with prolonged QTc  - EKG - QTc: 500 (), incomplete RBBB  - Incentive spirometry, robitussin DM, ocean spray  -O2 via NC 1-2 l keep O2 sat > 94%    #labs:  leukocytosis 12.3--> resolved 8.65   CBC -->10.2   CBC     #HTN  - Lopressor 25 q12, amlodipine 5 qd, hydralazine 50mg qd    #type II Mobitz block, known RBBB  - s/p Medtronic Micra AV+ leadless pacemaker  - outpt EP followup    #T2DM.  - SSI ACHS  - FS better controlled today    -diabetic educator consult appreciated :  ·	Start Metformin 500 mg w/ breakfast and dinner  ·	will decrease insulin doses to avoid potential hypoglycemia.  Will adjust up or down as needed in response to metformin  ·	Decrease Glargine from 37 to 30 units at bedtime  ·	Decrease Lispro from 10 to 5  units three times a day before meals w/ medium correction.    #HLD  - cont simvastatin 20 qhs    #mood/sleep  - Prozac 10  - melatonin 3    #Pain Mgmt   - Tylenol PRN    #GI/Bowel Mgmt   - Continent  - pepcid    #/Bladder Mgmt   - Continent, PVR    #FEN   - Diet - Regular + Thins  [CCHO]  DASH/TLC  -BUn/Cr 30/0.89  encourage po fluids  5/ with downtrending BUN   BMP     #skin:  abrasion back. bacitracin and DSD daily     #- DVT PPX  : Lovenox 70mg Q12h    #Case discussed in IDT rounds :  -patient requires min assist UE/mod LE, transfers max assist. max assist toileting. Noted to have deficits in problem-solving, cognition independent of aphasia  -goals: CG bADLs, mod assist iADLs  -target dc home  with caregiver support and home Pt and OT, SLP.  -will need caregiver training      #LABS  CBC, BMP   monitor FS

## 2020-05-02 NOTE — DISCHARGE NOTE PROVIDER - CARE PROVIDERS DIRECT ADDRESSES
,DirectAddress_Unknown,[email or web address]

## 2020-05-02 NOTE — DISCHARGE NOTE PROVIDER - NSDCCPCAREPLAN_GEN_ALL_CORE_FT
PRINCIPAL DISCHARGE DIAGNOSIS  Diagnosis: Cerebrovascular accident (CVA)  Assessment and Plan of Treatment:       SECONDARY DISCHARGE DIAGNOSES  Diagnosis: Type 2 diabetes mellitus  Assessment and Plan of Treatment:     Diagnosis: Mobitz type 2 second degree AV block  Assessment and Plan of Treatment:     Diagnosis: Right knee pain  Assessment and Plan of Treatment:     Diagnosis: HTN (hypertension)  Assessment and Plan of Treatment:     Diagnosis: COVID-19  Assessment and Plan of Treatment: PRINCIPAL DISCHARGE DIAGNOSIS  Diagnosis: Cerebrovascular accident (CVA)  Assessment and Plan of Treatment: with Subarachnoid hemorrhage. Stable on repeat imaging. COntinue medications and therapies as prescribed. [facility]      SECONDARY DISCHARGE DIAGNOSES  Diagnosis: Type 2 diabetes mellitus  Assessment and Plan of Treatment: MOnitor blood sugars at home. FOllow up with outpatient primary care provider    Diagnosis: Mobitz type 2 second degree AV block  Assessment and Plan of Treatment: with Pacemaker-  follow up with outpatient Electrophysiology.    Diagnosis: Right knee pain  Assessment and Plan of Treatment: and right ankle pain. -Xray right knee 4/30 negative for acute fracture  Xray right ankle 5/12 negative for fracture +calcaneal spur. CT with finding of small hematoma in righ tposterior knee. No intervention as per vascular . Reccomended right Ankle foot orthotic with liner. Contact information provided.    Diagnosis: HTN (hypertension)  Assessment and Plan of Treatment: MOnitor blood pressures at home. FOllow up with outpatient primary care provider    Diagnosis: COVID-19  Assessment and Plan of Treatment:

## 2020-05-02 NOTE — PROGRESS NOTE ADULT - SUBJECTIVE AND OBJECTIVE BOX
Patient is a 69y old  Female who presents with a chief complaint of left frontal parietal CVA with right HP and aphasia, COVID+ 4/8/20 (01 May 2020 11:12)      HPI:  SHIREEN CASH is a 70yo female RH dominant with PMH of HTN, HLD, T2DM, hx CVA, GERD, known RBBB presented to [facility] ED on 4/8/20 with complaints of fever, chills, cough, chest pain, SOB for 5 days. Per patient, her  tested +COVID at an outpatient testing facility a few days prior, patient herself had tested negative at the time. They continued to share the same living space. ED workup showed +COVID and Mobitz type II block. EP was consulted and patient is s/p Medtronic Micra AV+ leadless pacemaker implantation via right femoral vein on 4/10/20. Hydroxychloroquine presumably not given due to prolonged QTc.     On 4/16/20, patient developed acute aphasia and right sided weakness. CT head showed an acute LEFT frontal/parietal CVA. CTA head/neck showed LEFT ICA stenosis but no occlusion. tPA was not administered due to timing and no penumbra. Placed on full dose lovenox as thoguht to be hypercoagulable secondary to COVID. Patient was cleared for regular consistency solids, thin liquids. Transferred to [place] for acute inpatient rehab services 4/23/20. (23 Apr 2020 14:26)      PAST MEDICAL & SURGICAL HISTORY:  H/O gastroesophageal reflux (GERD)  HTN (hypertension)  DM (diabetes mellitus)  History of benign brain tumor      MEDICATIONS  (STANDING):  amLODIPine   Tablet 5 milliGRAM(s) Oral daily  aspirin enteric coated 81 milliGRAM(s) Oral daily  atorvastatin 10 milliGRAM(s) Oral at bedtime  BACItracin   Ointment 1 Application(s) Topical daily  benzonatate 100 milliGRAM(s) Oral every 8 hours  dextrose 5%. 1000 milliLiter(s) (50 mL/Hr) IV Continuous <Continuous>  dextrose 50% Injectable 12.5 Gram(s) IV Push once  dextrose 50% Injectable 25 Gram(s) IV Push once  dextrose 50% Injectable 25 Gram(s) IV Push once  enoxaparin Injectable 70 milliGRAM(s) SubCutaneous every 12 hours  famotidine    Tablet 20 milliGRAM(s) Oral daily  FLUoxetine 10 milliGRAM(s) Oral daily  gabapentin 300 milliGRAM(s) Oral at bedtime  hydrALAZINE 50 milliGRAM(s) Oral daily  insulin glargine Injectable (LANTUS) 30 Unit(s) SubCutaneous at bedtime  insulin lispro (HumaLOG) corrective regimen sliding scale   SubCutaneous three times a day before meals  insulin lispro (HumaLOG) corrective regimen sliding scale   SubCutaneous at bedtime  insulin lispro Injectable (HumaLOG) 5 Unit(s) SubCutaneous before dinner  lidocaine   Patch 1 Patch Transdermal daily  melatonin 3 milliGRAM(s) Oral at bedtime  metFORMIN 500 milliGRAM(s) Oral two times a day with meals  metoprolol tartrate 25 milliGRAM(s) Oral every 12 hours  sodium chloride 0.65% Nasal 1 Spray(s) Both Nostrils four times a day    MEDICATIONS  (PRN):  acetaminophen   Tablet .. 650 milliGRAM(s) Oral every 6 hours PRN Temp greater or equal to 38C (100.4F), Mild Pain (1 - 3), Moderate Pain (4 - 6)  dextrose 40% Gel 15 Gram(s) Oral once PRN Blood Glucose LESS THAN 70 milliGRAM(s)/deciliter  glucagon  Injectable 1 milliGRAM(s) IntraMuscular once PRN Glucose LESS THAN 70 milligrams/deciliter  lactulose Syrup 10 Gram(s) Oral daily PRN constipation        Allergies    No Known Allergies    Intolerances    Vital Signs Last 24 Hrs  T(C): 36.6 (02 May 2020 07:59), Max: 36.8 (01 May 2020 22:11)  T(F): 97.9 (02 May 2020 07:59), Max: 98.3 (01 May 2020 22:11)  HR: 66 (02 May 2020 07:59) (66 - 77)  BP: 116/67 (02 May 2020 07:59) (116/67 - 156/75)  BP(mean): --  RR: 17 (02 May 2020 07:59) (15 - 17)  SpO2: 99% (02 May 2020 07:59) (97% - 99%)      RECENT LABS/IMAGING                        10.2   10.46 )-----------( 348      ( 02 May 2020 00:45 )             31.1     05-02    136  |  100  |  24<H>  ----------------------------<  176<H>  4.9   |  27  |  0.79    Ca    9.7      02 May 2020 07:00  Phos  3.7     05-02  Mg     2.0     05-02    TPro  6.9  /  Alb  3.2<L>  /  TBili  0.3  /  DBili  x   /  AST  17  /  ALT  23  /  AlkPhos  72  05-02    < from: Xray Knee 3 Views, Right (05.01.20 @ 15:32) >  IMPRESSION:  No evidence for acute fracture or dislocation.    < end of copied text >      CAPILLARY BLOOD GLUCOSE  POCT Blood Glucose.: 184 mg/dL (02 May 2020 07:42)  POCT Blood Glucose.: 194 mg/dL (01 May 2020 22:14)  POCT Blood Glucose.: 130 mg/dL (01 May 2020 16:12)  POCT Blood Glucose.: 288 mg/dL (01 May 2020 12:05)          Review of Systems:   · Additional ROS	Patient seen and evaluated this morning. Patient endorsing to right knee pain and calf pain as well. Today she denies right UE pain. Patient overnight had chest pain with Trop and EKG done negative for ACS. No other pain this morning.     Physical Exam:   · Constitutional	detailed exam  · Constitutional Comments	eyes open, good- simple attention. Some recall deficits (said she had xray knee; was not yet performed)  · Respiratory	detailed exam  · Respiratory Details	respirations non-labored; clear to auscultation bilaterally  · Respiratory Comments	fair+ effort  · Cardiovascular	detailed exam  · Cardiovascular Details	regular rate and rhythm  · Gastrointestinal	detailed exam  · GI Normal	soft; nontender; bowel sounds normal  · Extremities	detailed exam  · Extremities Comments	no calf swelling +soft, NT no erythema or warmth, Pain elicited on the right knee and calf, swelling appears to be improved today

## 2020-05-02 NOTE — PROGRESS NOTE ADULT - ATTENDING COMMENTS
agree with note and plan above  discussed case with Dr. Nunes, patient complaining of right calf pain, however has been having pain in RLE for several days and had dopplers done 4/28 which were negative.  Plan to repeat dopplers as pain seems to be worsening.   BMP reviewed.

## 2020-05-02 NOTE — PROGRESS NOTE ADULT - ASSESSMENT
68yo Female with HTN, HLD, T2DM, hx CVA, GERD, known RBBB with COVID-19 debility and Left frontal and parietla CVA with residual right HP, foot drop, patricio inattention aphasia  - cont ASA, lovenox therapeutic dose as may be hypercoagulable due to COVID. (neg pfo)   - cont prozac for motor recovery  -right SAFO evalution with liner  clinical improvement noted       #right calf pain continues  doppler RLE negative,   -currently on therapeutic lovenox  check ck ro rhabdo   pain control     #Acute Hypoxic Respiratory Failure secondary to COVID-19 PNA  Date of COVID testin20 resolving   keep 02 sat approx 94%   monitor rr pattern    #HTN controlled  - Lopressor  amlodipine  hydralazine       #type II Mobitz block, known RBBB  - s/p Medtronic Micra AV+ leadless pacemaker  - outpt EP followup    #T2DM controlled now   continue the current lantus dosing   - iss, accuchecks    #HLD  - cont simvastatin 20 qhs    #mood/sleep controlled  - Prozac 10  - melatonin 3    #- DVT PPX  : Lovenox 70mg Q12h    Reviewed w patient plan of care   full code   reviewed w rehab team plan of care

## 2020-05-02 NOTE — DISCHARGE NOTE PROVIDER - NSDCMRMEDTOKEN_GEN_ALL_CORE_FT
acetaminophen 325 mg oral tablet: 2 tab(s) orally every 6 hours, As needed, Temp greater or equal to 38C (100.4F), Mild Pain (1 - 3), Moderate Pain (4 - 6)  amLODIPine 5 mg oral tablet: 1 tab(s) orally once a day  aspirin 81 mg oral delayed release tablet: 1 tab(s) orally once a day  enoxaparin: 70 milligram(s) subcutaneous 2 times a day  famotidine 20 mg oral tablet: 1 tab(s) orally once a day  FLUoxetine 10 mg oral capsule: 1 cap(s) orally once a day  hydrALAZINE 50 mg oral tablet: 1 tab(s) orally once a day  hydroCHLOROthiazide 25 mg oral tablet: 1 tab(s) orally once a day  insulin glargine: 25 unit(s) subcutaneous once a day (at bedtime)  melatonin 5 mg oral tablet: 1 tab(s) orally once a day (at bedtime)  metoprolol tartrate 25 mg oral tablet: 1 tab(s) orally every 12 hours  simvastatin 20 mg oral tablet: 1 tab(s) orally once a day (at bedtime) acetaminophen 325 mg oral tablet: 2 tab(s) orally every 6 hours, As needed, Temp greater or equal to 38C (100.4F), Mild Pain (1 - 3)  alcohol swabs : Apply topically to affected area 4 times a day   amLODIPine 5 mg oral tablet: 1 tab(s) orally once a day  aspirin 81 mg oral delayed release tablet: 1 tab(s) orally once a day  atorvastatin 10 mg oral tablet: 1 tab(s) orally once a day (at bedtime)  benzonatate 100 mg oral capsule: 1 cap(s) orally every 8 hours, As needed, Cough  famotidine 20 mg oral tablet: 1 tab(s) orally once a day  FLUoxetine 20 mg oral capsule: 1 cap(s) orally once a day  gabapentin 300 mg oral capsule: 1 cap(s) orally once a day (at bedtime)  hydrALAZINE 50 mg oral tablet: 1 tab(s) orally once a day  insulin glargine 100 units/mL subcutaneous solution: 20 unit(s) subcutaneous once a day (at bedtime)   lactulose 10 g/15 mL oral syrup: 15 milliliter(s) orally once a day, As needed, constipation  lidocaine topical:  topically 1 daily to painful area. On for 12 hours and off for 12 hours  melatonin 3 mg oral tablet: 1 tab(s) orally once a day (at bedtime)  metFORMIN 1000 mg oral tablet: 1 tab(s) orally 2 times a day (with meals)  metoprolol tartrate 25 mg oral tablet: 1 tab(s) orally every 12 hours  polyethylene glycol 3350 oral powder for reconstitution: 17 gram(s) orally once a day  simethicone 80 mg oral tablet, chewable: 1 tab(s) orally every 8 hours  sodium chloride 0.65% nasal spray:  nasal   U-100 Insulin Syringe, 1 mL: 1 application subcutaneously once a day (at bedtime)  ** 1 mL holds up to 100 units of insulin **

## 2020-05-02 NOTE — DISCHARGE NOTE PROVIDER - HOSPITAL COURSE
SHIREEN CASH is a 68yo female RH dominant with PMH of HTN, HLD, T2DM, hx CVA, GERD, known RBBB presented to [facility] ED on 4/8/20 with complaints of fever, chills, cough, chest pain, SOB for 5 days. Per patient, her  tested +COVID at an outpatient testing facility a few days prior, patient herself had tested negative at the time. They continued to share the same living space. ED workup showed +COVID and Mobitz type II block. EP was consulted and patient is s/p Medtronic Micra AV+ leadless pacemaker implantation via right femoral vein on 4/10/20. Hydroxychloroquine presumably not given due to prolonged QTc.         On 4/16/20, patient developed acute aphasia and right sided weakness. CT head showed an acute LEFT frontal/parietal CVA. CTA head/neck showed LEFT ICA stenosis but no occlusion. tPA was not administered due to timing and no penumbra. Placed on full dose lovenox as thoguht to be hypercoagulable secondary to COVID. Patient was cleared for regular consistency solids, thin liquids. Transferred to [place] for acute inpatient rehab services 4/23/20.        While in the acute rehab unit patient's hospital course significant for persistent pain the right lower and upper extremity(Lower extremity >upper extremity). Bilateral US negative for DVT SHIREEN CASH is a 68yo female RH dominant with PMH of HTN, HLD, T2DM, hx CVA, GERD, known RBBB presented to [facility] ED on 4/8/20 with complaints of fever, chills, cough, chest pain, SOB for 5 days. Per patient, her  tested +COVID at an outpatient testing facility a few days prior, patient herself had tested negative at the time. They continued to share the same living space. ED workup showed +COVID and Mobitz type II block. EP was consulted and patient is s/p Medtronic Micra AV+ leadless pacemaker implantation via right femoral vein on 4/10/20. Hydroxychloroquine presumably not given due to prolonged QTc.         On 4/16/20, patient developed acute aphasia and right sided weakness. CT head showed an acute LEFT frontal/parietal CVA. CTA head/neck showed LEFT ICA stenosis but no occlusion. tPA was not administered due to timing and no penumbra. Placed on full dose lovenox as thoguht to be hypercoagulable secondary to COVID. Patient was cleared for regular consistency solids, thin liquids. Transferred to [place] for acute inpatient rehab services 4/23/20.        While in the acute rehab unit patient's hospital course significant for persistent pain in the  right lower and upper extremity(Lower extremity >upper extremity). Bilateral US negative for DVT. Patient had right knee xray due to swelling and pain which was negative for anything acute. Gabapentin was started for possible centralized pain secondary to her stroke. Patient's course also significant for uncontrolled hyperglycemia. Diabetic counselor consulted and recommended to start metformin, decreased lantus from 37 to 30 units and decrease lispro from 10 units to 5 units TID to prevent hypoglycemia from the addition of metformin. Patient had chest pain overnight on 5/1 with troponin and EKG negative for signs of ACS. Patient to date has tolerated her therapy sessions and cleared for discharge to home  on 5/13 with caregiver support and home Pt and OT, SLP. Her repeat COVID swab was done on___________________ with __________________result. SHIREEN CASH is a 70yo female RH dominant with PMH of HTN, HLD, T2DM, hx CVA, GERD, known RBBB presented to [facility] ED on 4/8/20 with complaints of fever, chills, cough, chest pain, SOB for 5 days. Per patient, her  tested +COVID at an outpatient testing facility a few days prior, patient herself had tested negative at the time. They continued to share the same living space. ED workup showed +COVID and Mobitz type II block. EP was consulted and patient is s/p Medtronic Micra AV+ leadless pacemaker implantation via right femoral vein on 4/10/20. Hydroxychloroquine presumably not given due to prolonged QTc.         On 4/16/20, patient developed acute aphasia and right sided weakness. CT head showed an acute LEFT frontal/parietal CVA. CTA head/neck showed LEFT ICA stenosis but no occlusion. tPA was not administered due to timing and no penumbra. Placed on full dose lovenox as thoguht to be hypercoagulable secondary to COVID. Patient was cleared for regular consistency solids, thin liquids. Transferred to [place] for acute inpatient rehab services 4/23/20.        While in the acute rehab unit patient's hospital course significant for persistent pain in the  right lower and upper extremity(Lower extremity >upper extremity). Bilateral US negative for DVT. Patient had right knee xray due to swelling and pain which was negative for anything acute. Gabapentin was started for possible centralized pain secondary to her stroke. Patient's course also significant for uncontrolled hyperglycemia. Diabetic counselor consulted and recommended to start metformin, decreased lantus from 37 to 30 units and decrease lispro from 10 units to 5 units TID to prevent hypoglycemia from the addition of metformin. Patient had chest pain overnight on 5/1 with troponin and EKG negative for signs of ACS. Patient to date has tolerated her therapy sessions and cleared for discharge to home  on 5/13 with caregiver support and home Pt and OT, SLP. During hospital course patient had complaint of right knee pain, tender to palpation with ecchymosis around knee. LE dopplers negative for DVT 4/28 and 5/7. Vascular consulted.  5/8 LE ANGEL performed - with  B/L +moderate PAD. 5/8 CT RLE with finding of hematoma, as per vascular would monitor for now, can continue dvt ppx given risk of COVID. Xray right knee 4/30 negative for acute fracture. Xray right ankle 5/12 negative for fracture +calcaneal spur. Patient knee ecchymosis and pain resolved. Neuro was consulted. As per neuro, no need for therapeutic dose lovnox with COVID and CVA. CT head however showing new Mild subarachnoid hemorrhage in the left frontal lobe. CT head 5/15 with new. igh tlateral perimesencephalic SAH. Repeat imaging on 5/20+22+26 stable. Heme onc consulte, coagubility w/u negative. As per neuro and heme onc, no contraindication to resume dvt ppx and ASA. and patient h/h was stable through course.        patient made functional progress with pt ot and slp. patient had family training on 5/28. Patient stable for discharge with appropriate follow up. SHIREEN CASH is a 68yo female RH dominant with PMH of HTN, HLD, T2DM, hx CVA, GERD, known RBBB presented to [facility] ED on 4/8/20 with complaints of fever, chills, cough, chest pain, SOB for 5 days. Per patient, her  tested +COVID at an outpatient testing facility a few days prior, patient herself had tested negative at the time. They continued to share the same living space. ED workup showed +COVID and Mobitz type II block. EP was consulted and patient is s/p Medtronic Micra AV+ leadless pacemaker implantation via right femoral vein on 4/10/20. Hydroxychloroquine presumably not given due to prolonged QTc.         On 4/16/20, patient developed acute aphasia and right sided weakness. CT head showed an acute LEFT frontal/parietal CVA. CTA head/neck showed LEFT ICA stenosis but no occlusion. tPA was not administered due to timing and no penumbra. Placed on full dose lovenox as thoguht to be hypercoagulable secondary to COVID. Patient was cleared for regular consistency solids, thin liquids. Transferred to [place] for acute inpatient rehab services 4/23/20.        While in the acute rehab unit patient's hospital course significant for persistent pain in the  right lower and upper extremity(Lower extremity >upper extremity). Bilateral US negative for DVT. Patient had right knee xray due to swelling and pain which was negative for anything acute. Gabapentin was started for possible centralized pain secondary to her stroke. Patient's course also significant for uncontrolled hyperglycemia. Diabetic counselor consulted and recommended to start metformin, decreased lantus from 37 to 30 units and decrease lispro from 10 units to 5 units TID to prevent hypoglycemia from the addition of metformin. Patient had chest pain overnight on 5/1 with troponin and EKG negative for signs of ACS. Patient to date has tolerated her therapy sessions and cleared for discharge to home  on 5/13 with caregiver support and home Pt and OT, SLP. During hospital course patient had complaint of right knee pain, tender to palpation with ecchymosis around knee. LE dopplers negative for DVT 4/28 and 5/7. Vascular consulted.  5/8 LE ANGEL performed - with  B/L +moderate PAD. 5/8 CT RLE with finding of hematoma, as per vascular would monitor for now, can continue dvt ppx given risk of COVID. Xray right knee 4/30 negative for acute fracture. Xray right ankle 5/12 negative for fracture +calcaneal spur. Patient knee ecchymosis and pain resolved. Neuro was consulted. As per neuro, no need for therapeutic dose lovnox with COVID and CVA. CT head however showing new Mild subarachnoid hemorrhage in the left frontal lobe. CT head 5/15 with new. igh tlateral perimesencephalic SAH. Repeat imaging on 5/20+22+26 stable. Heme onc consulte, coagubility w/u negative. As per neuro and heme onc, no contraindication to resume dvt ppx and ASA. and patient h/h was stable through course.        patient made functional progress with pt ot and slp. patient had family training on 5/28. Patient stable for discharge with appropriate follow up. She will be referred for home PT, OT, and SLP services, recommended for sAFO right foot drop with liner. PCP, neurology and PM+R follow up on dc.

## 2020-05-02 NOTE — DISCHARGE NOTE PROVIDER - CARE PROVIDER_API CALL
Jason Montgomery)  Cardiology; Internal Medicine  [address], [address]  [place] [address]  Phone: [phone]  Fax: [phone]  Follow Up Time:     Idris White)  Neurology  [address]  [place] [address]  Phone: [phone]  Fax: [phone]  Follow Up Time:     Idania Cross)  Brain Injury Medicine; PhysicalRehab Medicine  [address] [place] [address]  Phone: [phone]  Fax: [phone]  Follow Up Time:

## 2020-05-02 NOTE — DISCHARGE NOTE PROVIDER - PROVIDER TOKENS
PROVIDER:[TOKEN:[[id number]]],PROVIDER:[TOKEN:[[id number]]],PROVIDER:[TOKEN:[[id number]]]

## 2020-05-02 NOTE — PROGRESS NOTE ADULT - SUBJECTIVE AND OBJECTIVE BOX
rehab team and I   gather  around the patient    r calf pain to palp     recent duplex neg currently on full dose Lovenox   no fall     discussed the management plan      ros all others are neg     Vital Signs Last 24 Hrs    T(C): 36.6 (02 May 2020 07:59), Max: 36.8 (01 May 2020 22:11)  T(F): 97.9 (02 May 2020 07:59), Max: 98.3 (01 May 2020 22:11)  HR: 66 (02 May 2020 07:59) (66 - 77)  BP: 116/67 (02 May 2020 07:59) (116/67 - 156/75)  BP(mean): --  RR: 17 (02 May 2020 07:59) (15 - 17)  SpO2: 99% (02 May 2020 07:59) (97% - 99%)    MEDICATIONS  (STANDING):    amLODIPine   Tablet 5 milliGRAM(s) Oral daily  aspirin enteric coated 81 milliGRAM(s) Oral daily  atorvastatin 10 milliGRAM(s) Oral at bedtime  BACItracin   Ointment 1 Application(s) Topical daily  benzonatate 100 milliGRAM(s) Oral every 8 hours  dextrose 5%. 1000 milliLiter(s) (50 mL/Hr) IV Continuous <Continuous>  dextrose 50% Injectable 12.5 Gram(s) IV Push once  dextrose 50% Injectable 25 Gram(s) IV Push once  dextrose 50% Injectable 25 Gram(s) IV Push once  enoxaparin Injectable 70 milliGRAM(s) SubCutaneous every 12 hours  famotidine    Tablet 20 milliGRAM(s) Oral daily  FLUoxetine 10 milliGRAM(s) Oral daily  gabapentin 300 milliGRAM(s) Oral at bedtime  hydrALAZINE 50 milliGRAM(s) Oral daily  insulin glargine Injectable (LANTUS) 30 Unit(s) SubCutaneous at bedtime  insulin lispro (HumaLOG) corrective regimen sliding scale   SubCutaneous three times a day before meals  insulin lispro (HumaLOG) corrective regimen sliding scale   SubCutaneous at bedtime  insulin lispro Injectable (HumaLOG) 5 Unit(s) SubCutaneous before dinner  lidocaine   Patch 1 Patch Transdermal daily  melatonin 3 milliGRAM(s) Oral at bedtime  metFORMIN 500 milliGRAM(s) Oral two times a day with meals  metoprolol tartrate 25 milliGRAM(s) Oral every 12 hours  sodium chloride 0.65% Nasal 1 Spray(s) Both Nostrils four times a day                        10.2   10.46 )-----------( 348      ( 02 May 2020 00:45 )             31.1     05-02    136  |  100  |  24<H>  ----------------------------<  176<H>  4.9   |  27  |  0.79    Ca    9.7      02 May 2020 07:00  Phos  3.7     05-02  Mg     2.0     05-02    TPro  6.9  /  Alb  3.2<L>  /  TBili  0.3  /  DBili  x   /  AST  17  /  ALT  23  /  AlkPhos  72  05-02

## 2020-05-03 PROCEDURE — 99233 SBSQ HOSP IP/OBS HIGH 50: CPT

## 2020-05-03 RX ORDER — POLYETHYLENE GLYCOL 3350 17 G/17G
17 POWDER, FOR SOLUTION ORAL DAILY
Refills: 0 | Status: DISCONTINUED | OUTPATIENT
Start: 2020-05-03 | End: 2020-05-29

## 2020-05-03 RX ADMIN — Medication 4: at 13:17

## 2020-05-03 RX ADMIN — Medication 5 UNIT(S): at 17:18

## 2020-05-03 RX ADMIN — Medication 100 MILLIGRAM(S): at 06:14

## 2020-05-03 RX ADMIN — AMLODIPINE BESYLATE 5 MILLIGRAM(S): 2.5 TABLET ORAL at 06:13

## 2020-05-03 RX ADMIN — FAMOTIDINE 20 MILLIGRAM(S): 10 INJECTION INTRAVENOUS at 13:19

## 2020-05-03 RX ADMIN — ENOXAPARIN SODIUM 70 MILLIGRAM(S): 100 INJECTION SUBCUTANEOUS at 06:14

## 2020-05-03 RX ADMIN — Medication 10 MILLIGRAM(S): at 13:19

## 2020-05-03 RX ADMIN — Medication 1 SPRAY(S): at 06:14

## 2020-05-03 RX ADMIN — Medication 1 SPRAY(S): at 00:32

## 2020-05-03 RX ADMIN — Medication 2: at 17:18

## 2020-05-03 RX ADMIN — Medication 1 APPLICATION(S): at 13:19

## 2020-05-03 RX ADMIN — Medication 25 MILLIGRAM(S): at 06:14

## 2020-05-03 RX ADMIN — METFORMIN HYDROCHLORIDE 500 MILLIGRAM(S): 850 TABLET ORAL at 18:07

## 2020-05-03 RX ADMIN — GABAPENTIN 300 MILLIGRAM(S): 400 CAPSULE ORAL at 21:34

## 2020-05-03 RX ADMIN — Medication 1 SPRAY(S): at 13:30

## 2020-05-03 RX ADMIN — Medication 100 MILLIGRAM(S): at 13:19

## 2020-05-03 RX ADMIN — LIDOCAINE 1 PATCH: 4 CREAM TOPICAL at 20:17

## 2020-05-03 RX ADMIN — Medication 3 MILLIGRAM(S): at 21:34

## 2020-05-03 RX ADMIN — INSULIN GLARGINE 30 UNIT(S): 100 INJECTION, SOLUTION SUBCUTANEOUS at 21:47

## 2020-05-03 RX ADMIN — LIDOCAINE 1 PATCH: 4 CREAM TOPICAL at 13:20

## 2020-05-03 RX ADMIN — ATORVASTATIN CALCIUM 10 MILLIGRAM(S): 80 TABLET, FILM COATED ORAL at 21:34

## 2020-05-03 RX ADMIN — Medication 1 SPRAY(S): at 18:08

## 2020-05-03 RX ADMIN — LIDOCAINE 1 PATCH: 4 CREAM TOPICAL at 00:32

## 2020-05-03 RX ADMIN — Medication 50 MILLIGRAM(S): at 06:14

## 2020-05-03 RX ADMIN — Medication 650 MILLIGRAM(S): at 21:46

## 2020-05-03 RX ADMIN — METFORMIN HYDROCHLORIDE 500 MILLIGRAM(S): 850 TABLET ORAL at 08:20

## 2020-05-03 RX ADMIN — ENOXAPARIN SODIUM 70 MILLIGRAM(S): 100 INJECTION SUBCUTANEOUS at 18:08

## 2020-05-03 RX ADMIN — Medication 2: at 08:19

## 2020-05-03 RX ADMIN — Medication 81 MILLIGRAM(S): at 13:19

## 2020-05-03 RX ADMIN — Medication 25 MILLIGRAM(S): at 18:08

## 2020-05-03 RX ADMIN — Medication 100 MILLIGRAM(S): at 21:34

## 2020-05-03 RX ADMIN — Medication 1 SPRAY(S): at 22:44

## 2020-05-03 NOTE — PROGRESS NOTE ADULT - ASSESSMENT
70yo Female with HTN, HLD, T2DM, hx CVA, GERD, known RBBB with COVID-19 debility and Left frontal and parietla CVA with residual right HP, foot drop, patricio inattention aphasia  - cont ASA, lovenox therapeutic dose as may be hypercoagulable due to COVID. (neg pfo)   - cont prozac for motor recovery  -right SAFO evalution with liner  clinical improvement noted       #right calf pain continues  doppler RLE negative,   -currently on therapeutic lovenox  check ck ro rhabdo   pain control     #Acute Hypoxic Respiratory Failure secondary to COVID-19 PNA  Date of COVID testin20 resolving  on 1 L NC now   keep 02 sat approx 94%   monitor rr pattern    #HTN controlled  - Lopressor  amlodipine  hydralazine       #type II Mobitz block, known RBBB  - s/p Medtronic Micra AV+ leadless pacemaker  - outpt EP followup    #T2DM controlled now   continue the current lantus dosing   - iss, accuchecks    #HLD  - cont simvastatin 20 qhs    #mood/sleep controlled  - Prozac 10  - melatonin 3    #- DVT PPX  : Lovenox 70mg Q12h    Reviewed w patient plan of care   full code   reviewed w rehab team plan of care

## 2020-05-03 NOTE — PROGRESS NOTE ADULT - SUBJECTIVE AND OBJECTIVE BOX
in bed    on 1 L NC     no co     ROS all others are neg     no fc     Vital Signs Last 24 Hrs    T(C): 37 (03 May 2020 06:12), Max: 37.3 (02 May 2020 21:00)  T(F): 98.6 (03 May 2020 06:12), Max: 99.1 (02 May 2020 21:00)  HR: 86 (03 May 2020 06:12) (67 - 86)  BP: 112/59 (03 May 2020 06:12) (112/59 - 127/71)  BP(mean): --  RR: 16 (03 May 2020 06:12) (16 - 16)  SpO2: 99% (03 May 2020 06:12) (99% - 99%)  MEDICATIONS  (STANDING):    amLODIPine   Tablet 5 milliGRAM(s) Oral daily  aspirin enteric coated 81 milliGRAM(s) Oral daily  atorvastatin 10 milliGRAM(s) Oral at bedtime  BACItracin   Ointment 1 Application(s) Topical daily  benzonatate 100 milliGRAM(s) Oral every 8 hours  dextrose 5%. 1000 milliLiter(s) (50 mL/Hr) IV Continuous <Continuous>  dextrose 50% Injectable 12.5 Gram(s) IV Push once  dextrose 50% Injectable 25 Gram(s) IV Push once  dextrose 50% Injectable 25 Gram(s) IV Push once  enoxaparin Injectable 70 milliGRAM(s) SubCutaneous every 12 hours  famotidine    Tablet 20 milliGRAM(s) Oral daily  FLUoxetine 10 milliGRAM(s) Oral daily  gabapentin 300 milliGRAM(s) Oral at bedtime  hydrALAZINE 50 milliGRAM(s) Oral daily                          10.2   10.46 )-----------( 348      ( 02 May 2020 00:45 )             31.1   05-02    136  |  100  |  24<H>  ----------------------------<  176<H>  4.9   |  27  |  0.79    Ca    9.7      02 May 2020 07:00  Phos  3.7     05-02  Mg     2.0     05-02    TPro  6.9  /  Alb  3.2<L>  /  TBili  0.3  /  DBili  x   /  AST  17  /  ALT  23  /  AlkPhos  72  05-02    insulin glargine Injectable (LANTUS) 30 Unit(s) SubCutaneous at bedtime  insulin lispro (HumaLOG) corrective regimen sliding scale   SubCutaneous three times a day before meals  insulin lispro (HumaLOG) corrective regimen sliding scale   SubCutaneous at bedtime  insulin lispro Injectable (HumaLOG) 5 Unit(s) SubCutaneous before dinner  lidocaine   Patch 1 Patch Transdermal daily  melatonin 3 milliGRAM(s) Oral at bedtime  metFORMIN 500 milliGRAM(s) Oral two times a day with meals  metoprolol tartrate 25 milliGRAM(s) Oral every 12 hours  sodium chloride 0.65% Nasal 1 Spray(s) Both Nostrils four times a day

## 2020-05-04 LAB
ALBUMIN SERPL ELPH-MCNC: 3.3 G/DL — SIGNIFICANT CHANGE UP (ref 3.3–5)
ALP SERPL-CCNC: 66 U/L — SIGNIFICANT CHANGE UP (ref 40–120)
ALT FLD-CCNC: 20 U/L — SIGNIFICANT CHANGE UP (ref 10–45)
ANION GAP SERPL CALC-SCNC: 7 MMOL/L — SIGNIFICANT CHANGE UP (ref 5–17)
ANION GAP SERPL CALC-SCNC: 7 MMOL/L — SIGNIFICANT CHANGE UP (ref 5–17)
AST SERPL-CCNC: 20 U/L — SIGNIFICANT CHANGE UP (ref 10–40)
BASOPHILS # BLD AUTO: 0.05 K/UL — SIGNIFICANT CHANGE UP (ref 0–0.2)
BASOPHILS NFR BLD AUTO: 0.5 % — SIGNIFICANT CHANGE UP (ref 0–2)
BILIRUB SERPL-MCNC: 0.5 MG/DL — SIGNIFICANT CHANGE UP (ref 0.2–1.2)
BUN SERPL-MCNC: 25 MG/DL — HIGH (ref 7–23)
BUN SERPL-MCNC: 26 MG/DL — HIGH (ref 7–23)
CALCIUM SERPL-MCNC: 9.7 MG/DL — SIGNIFICANT CHANGE UP (ref 8.4–10.5)
CALCIUM SERPL-MCNC: 9.9 MG/DL — SIGNIFICANT CHANGE UP (ref 8.4–10.5)
CHLORIDE SERPL-SCNC: 97 MMOL/L — SIGNIFICANT CHANGE UP (ref 96–108)
CHLORIDE SERPL-SCNC: 98 MMOL/L — SIGNIFICANT CHANGE UP (ref 96–108)
CO2 SERPL-SCNC: 29 MMOL/L — SIGNIFICANT CHANGE UP (ref 22–31)
CO2 SERPL-SCNC: 29 MMOL/L — SIGNIFICANT CHANGE UP (ref 22–31)
CREAT SERPL-MCNC: 0.96 MG/DL — SIGNIFICANT CHANGE UP (ref 0.5–1.3)
CREAT SERPL-MCNC: 1.03 MG/DL — SIGNIFICANT CHANGE UP (ref 0.5–1.3)
EOSINOPHIL # BLD AUTO: 0.41 K/UL — SIGNIFICANT CHANGE UP (ref 0–0.5)
EOSINOPHIL NFR BLD AUTO: 4.2 % — SIGNIFICANT CHANGE UP (ref 0–6)
GLUCOSE SERPL-MCNC: 180 MG/DL — HIGH (ref 70–99)
GLUCOSE SERPL-MCNC: 181 MG/DL — HIGH (ref 70–99)
HCT VFR BLD CALC: 31 % — LOW (ref 34.5–45)
HGB BLD-MCNC: 10 G/DL — LOW (ref 11.5–15.5)
IMM GRANULOCYTES NFR BLD AUTO: 1.3 % — SIGNIFICANT CHANGE UP (ref 0–1.5)
LYMPHOCYTES # BLD AUTO: 2.57 K/UL — SIGNIFICANT CHANGE UP (ref 1–3.3)
LYMPHOCYTES # BLD AUTO: 26.6 % — SIGNIFICANT CHANGE UP (ref 13–44)
MCHC RBC-ENTMCNC: 28.6 PG — SIGNIFICANT CHANGE UP (ref 27–34)
MCHC RBC-ENTMCNC: 32.3 GM/DL — SIGNIFICANT CHANGE UP (ref 32–36)
MCV RBC AUTO: 88.6 FL — SIGNIFICANT CHANGE UP (ref 80–100)
MONOCYTES # BLD AUTO: 0.91 K/UL — HIGH (ref 0–0.9)
MONOCYTES NFR BLD AUTO: 9.4 % — SIGNIFICANT CHANGE UP (ref 2–14)
NEUTROPHILS # BLD AUTO: 5.58 K/UL — SIGNIFICANT CHANGE UP (ref 1.8–7.4)
NEUTROPHILS NFR BLD AUTO: 58 % — SIGNIFICANT CHANGE UP (ref 43–77)
NRBC # BLD: 0 /100 WBCS — SIGNIFICANT CHANGE UP (ref 0–0)
PLATELET # BLD AUTO: 358 K/UL — SIGNIFICANT CHANGE UP (ref 150–400)
POTASSIUM SERPL-MCNC: 4.8 MMOL/L — SIGNIFICANT CHANGE UP (ref 3.5–5.3)
POTASSIUM SERPL-MCNC: 4.9 MMOL/L — SIGNIFICANT CHANGE UP (ref 3.5–5.3)
POTASSIUM SERPL-SCNC: 4.8 MMOL/L — SIGNIFICANT CHANGE UP (ref 3.5–5.3)
POTASSIUM SERPL-SCNC: 4.9 MMOL/L — SIGNIFICANT CHANGE UP (ref 3.5–5.3)
PROT SERPL-MCNC: 7 G/DL — SIGNIFICANT CHANGE UP (ref 6–8.3)
RBC # BLD: 3.5 M/UL — LOW (ref 3.8–5.2)
RBC # FLD: 15.5 % — HIGH (ref 10.3–14.5)
SODIUM SERPL-SCNC: 133 MMOL/L — LOW (ref 135–145)
SODIUM SERPL-SCNC: 134 MMOL/L — LOW (ref 135–145)
WBC # BLD: 9.65 K/UL — SIGNIFICANT CHANGE UP (ref 3.8–10.5)
WBC # FLD AUTO: 9.65 K/UL — SIGNIFICANT CHANGE UP (ref 3.8–10.5)

## 2020-05-04 PROCEDURE — 99233 SBSQ HOSP IP/OBS HIGH 50: CPT

## 2020-05-04 PROCEDURE — 99232 SBSQ HOSP IP/OBS MODERATE 35: CPT

## 2020-05-04 RX ORDER — INSULIN LISPRO 100/ML
3 VIAL (ML) SUBCUTANEOUS
Refills: 0 | Status: DISCONTINUED | OUTPATIENT
Start: 2020-05-04 | End: 2020-05-07

## 2020-05-04 RX ORDER — IBUPROFEN 200 MG
400 TABLET ORAL EVERY 12 HOURS
Refills: 0 | Status: DISCONTINUED | OUTPATIENT
Start: 2020-05-04 | End: 2020-05-08

## 2020-05-04 RX ORDER — SOD,AMMONIUM,POTASSIUM LACTATE
1 CREAM (GRAM) TOPICAL
Refills: 0 | Status: DISCONTINUED | OUTPATIENT
Start: 2020-05-04 | End: 2020-05-29

## 2020-05-04 RX ADMIN — Medication 100 MILLIGRAM(S): at 11:47

## 2020-05-04 RX ADMIN — Medication 650 MILLIGRAM(S): at 13:43

## 2020-05-04 RX ADMIN — Medication 81 MILLIGRAM(S): at 11:45

## 2020-05-04 RX ADMIN — GABAPENTIN 300 MILLIGRAM(S): 400 CAPSULE ORAL at 22:35

## 2020-05-04 RX ADMIN — METFORMIN HYDROCHLORIDE 500 MILLIGRAM(S): 850 TABLET ORAL at 17:12

## 2020-05-04 RX ADMIN — Medication 1 APPLICATION(S): at 22:42

## 2020-05-04 RX ADMIN — Medication 1 APPLICATION(S): at 11:47

## 2020-05-04 RX ADMIN — ATORVASTATIN CALCIUM 10 MILLIGRAM(S): 80 TABLET, FILM COATED ORAL at 22:36

## 2020-05-04 RX ADMIN — Medication 3 UNIT(S): at 11:47

## 2020-05-04 RX ADMIN — INSULIN GLARGINE 30 UNIT(S): 100 INJECTION, SOLUTION SUBCUTANEOUS at 22:36

## 2020-05-04 RX ADMIN — Medication 3 MILLIGRAM(S): at 22:36

## 2020-05-04 RX ADMIN — Medication 1 SPRAY(S): at 17:13

## 2020-05-04 RX ADMIN — Medication 25 MILLIGRAM(S): at 06:17

## 2020-05-04 RX ADMIN — ENOXAPARIN SODIUM 70 MILLIGRAM(S): 100 INJECTION SUBCUTANEOUS at 22:42

## 2020-05-04 RX ADMIN — METFORMIN HYDROCHLORIDE 500 MILLIGRAM(S): 850 TABLET ORAL at 08:14

## 2020-05-04 RX ADMIN — LIDOCAINE 1 PATCH: 4 CREAM TOPICAL at 01:08

## 2020-05-04 RX ADMIN — Medication 2: at 08:06

## 2020-05-04 RX ADMIN — LIDOCAINE 1 PATCH: 4 CREAM TOPICAL at 14:30

## 2020-05-04 RX ADMIN — Medication 100 MILLIGRAM(S): at 06:17

## 2020-05-04 RX ADMIN — ENOXAPARIN SODIUM 70 MILLIGRAM(S): 100 INJECTION SUBCUTANEOUS at 06:18

## 2020-05-04 RX ADMIN — Medication 1 SPRAY(S): at 06:18

## 2020-05-04 RX ADMIN — Medication 100 MILLIGRAM(S): at 22:36

## 2020-05-04 RX ADMIN — LIDOCAINE 1 PATCH: 4 CREAM TOPICAL at 18:08

## 2020-05-04 RX ADMIN — Medication 10 MILLIGRAM(S): at 11:45

## 2020-05-04 RX ADMIN — AMLODIPINE BESYLATE 5 MILLIGRAM(S): 2.5 TABLET ORAL at 06:17

## 2020-05-04 RX ADMIN — FAMOTIDINE 20 MILLIGRAM(S): 10 INJECTION INTRAVENOUS at 11:45

## 2020-05-04 RX ADMIN — Medication 6: at 11:47

## 2020-05-04 RX ADMIN — Medication 1 SPRAY(S): at 11:51

## 2020-05-04 RX ADMIN — POLYETHYLENE GLYCOL 3350 17 GRAM(S): 17 POWDER, FOR SOLUTION ORAL at 11:46

## 2020-05-04 RX ADMIN — Medication 50 MILLIGRAM(S): at 06:17

## 2020-05-04 RX ADMIN — Medication 25 MILLIGRAM(S): at 17:12

## 2020-05-04 RX ADMIN — Medication 3 UNIT(S): at 17:03

## 2020-05-04 NOTE — PROGRESS NOTE ADULT - SUBJECTIVE AND OBJECTIVE BOX
Patient is a 69y old  Female who presents with a chief complaint of left frontal parietal CVA with right HP and aphasia, COVID+ 4/8/20 (04 May 2020 12:42)      HPI:  SHIREEN CASH is a 68yo female RH dominant with PMH of HTN, HLD, T2DM, hx CVA, GERD, known RBBB presented to [facility] ED on 4/8/20 with complaints of fever, chills, cough, chest pain, SOB for 5 days. Per patient, her  tested +COVID at an outpatient testing facility a few days prior, patient herself had tested negative at the time. They continued to share the same living space. ED workup showed +COVID and Mobitz type II block. EP was consulted and patient is s/p Medtronic Micra AV+ leadless pacemaker implantation via right femoral vein on 4/10/20. Hydroxychloroquine presumably not given due to prolonged QTc.     On 4/16/20, patient developed acute aphasia and right sided weakness. CT head showed an acute LEFT frontal/parietal CVA. CTA head/neck showed LEFT ICA stenosis but no occlusion. tPA was not administered due to timing and no penumbra. Placed on full dose lovenox as thoguht to be hypercoagulable secondary to COVID. Patient was cleared for regular consistency solids, thin liquids. Transferred to [place] for acute inpatient rehab services 4/23/20. (23 Apr 2020 14:26)      PAST MEDICAL & SURGICAL HISTORY:  H/O gastroesophageal reflux (GERD)  HTN (hypertension)  DM (diabetes mellitus)  History of benign brain tumor      MEDICATIONS  (STANDING):  amLODIPine   Tablet 5 milliGRAM(s) Oral daily  aspirin enteric coated 81 milliGRAM(s) Oral daily  atorvastatin 10 milliGRAM(s) Oral at bedtime  BACItracin   Ointment 1 Application(s) Topical daily  benzonatate 100 milliGRAM(s) Oral every 8 hours  dextrose 5%. 1000 milliLiter(s) (50 mL/Hr) IV Continuous <Continuous>  dextrose 50% Injectable 12.5 Gram(s) IV Push once  dextrose 50% Injectable 25 Gram(s) IV Push once  dextrose 50% Injectable 25 Gram(s) IV Push once  enoxaparin Injectable 70 milliGRAM(s) SubCutaneous every 12 hours  famotidine    Tablet 20 milliGRAM(s) Oral daily  FLUoxetine 10 milliGRAM(s) Oral daily  gabapentin 300 milliGRAM(s) Oral at bedtime  hydrALAZINE 50 milliGRAM(s) Oral daily  insulin glargine Injectable (LANTUS) 30 Unit(s) SubCutaneous at bedtime  insulin lispro (HumaLOG) corrective regimen sliding scale   SubCutaneous three times a day before meals  insulin lispro (HumaLOG) corrective regimen sliding scale   SubCutaneous at bedtime  insulin lispro Injectable (HumaLOG) 3 Unit(s) SubCutaneous three times a day before meals  lidocaine   Patch 1 Patch Transdermal daily  melatonin 3 milliGRAM(s) Oral at bedtime  metFORMIN 500 milliGRAM(s) Oral two times a day with meals  metoprolol tartrate 25 milliGRAM(s) Oral every 12 hours  polyethylene glycol 3350 17 Gram(s) Oral daily  sodium chloride 0.65% Nasal 1 Spray(s) Both Nostrils four times a day    MEDICATIONS  (PRN):  acetaminophen   Tablet .. 650 milliGRAM(s) Oral every 6 hours PRN Temp greater or equal to 38C (100.4F), Mild Pain (1 - 3), Moderate Pain (4 - 6)  dextrose 40% Gel 15 Gram(s) Oral once PRN Blood Glucose LESS THAN 70 milliGRAM(s)/deciliter  glucagon  Injectable 1 milliGRAM(s) IntraMuscular once PRN Glucose LESS THAN 70 milligrams/deciliter  lactulose Syrup 10 Gram(s) Oral daily PRN constipation      Allergies    No Known Allergies    Intolerances            PHYSICAL EXAM  69y  Vital Signs Last 24 Hrs  T(C): 36.9 (04 May 2020 08:32), Max: 37.1 (04 May 2020 06:14)  T(F): 98.4 (04 May 2020 08:32), Max: 98.7 (04 May 2020 06:14)  HR: 74 (04 May 2020 08:32) (74 - 104)  BP: 118/74 (04 May 2020 08:32) (118/74 - 128/72)  BP(mean): --  RR: 16 (04 May 2020 08:32) (16 - 17)  SpO2: 96% (04 May 2020 08:32) (95% - 96%)  Daily         RECENT LABS:                          10.0   9.65  )-----------( 358      ( 04 May 2020 07:36 )             31.0     05-04    133<L>  |  97  |  26<H>  ----------------------------<  180<H>  4.8   |  29  |  0.96    Ca    9.9      04 May 2020 07:36    TPro  7.0  /  Alb  3.3  /  TBili  0.5  /  DBili  x   /  AST  20  /  ALT  20  /  AlkPhos  66  05-04    LIVER FUNCTIONS - ( 04 May 2020 07:36 )  Alb: 3.3 g/dL / Pro: 7.0 g/dL / ALK PHOS: 66 U/L / ALT: 20 U/L / AST: 20 U/L / GGT: x                   CAPILLARY BLOOD GLUCOSE      POCT Blood Glucose.: 269 mg/dL (04 May 2020 11:44)  POCT Blood Glucose.: 189 mg/dL (04 May 2020 07:38)  POCT Blood Glucose.: 158 mg/dL (03 May 2020 21:46)  POCT Blood Glucose.: 185 mg/dL (03 May 2020 16:27) Patient is a 69y old  Female who presents with a chief complaint of left frontal parietal CVA with right HP and aphasia, COVID+ 4/8/20 (04 May 2020 12:42)      HPI:  SHIREEN CASH is a 68yo female RH dominant with PMH of HTN, HLD, T2DM, hx CVA, GERD, known RBBB presented to [facility] ED on 4/8/20 with complaints of fever, chills, cough, chest pain, SOB for 5 days. Per patient, her  tested +COVID at an outpatient testing facility a few days prior, patient herself had tested negative at the time. They continued to share the same living space. ED workup showed +COVID and Mobitz type II block. EP was consulted and patient is s/p Medtronic Micra AV+ leadless pacemaker implantation via right femoral vein on 4/10/20. Hydroxychloroquine presumably not given due to prolonged QTc.     On 4/16/20, patient developed acute aphasia and right sided weakness. CT head showed an acute LEFT frontal/parietal CVA. CTA head/neck showed LEFT ICA stenosis but no occlusion. tPA was not administered due to timing and no penumbra. Placed on full dose lovenox as thoguht to be hypercoagulable secondary to COVID. Patient was cleared for regular consistency solids, thin liquids. Transferred to [place] for acute inpatient rehab services 4/23/20. (23 Apr 2020 14:26)      PAST MEDICAL & SURGICAL HISTORY:  H/O gastroesophageal reflux (GERD)  HTN (hypertension)  DM (diabetes mellitus)  History of benign brain tumor      MEDICATIONS  (STANDING):  amLODIPine   Tablet 5 milliGRAM(s) Oral daily  aspirin enteric coated 81 milliGRAM(s) Oral daily  atorvastatin 10 milliGRAM(s) Oral at bedtime  BACItracin   Ointment 1 Application(s) Topical daily  benzonatate 100 milliGRAM(s) Oral every 8 hours  dextrose 5%. 1000 milliLiter(s) (50 mL/Hr) IV Continuous <Continuous>  dextrose 50% Injectable 12.5 Gram(s) IV Push once  dextrose 50% Injectable 25 Gram(s) IV Push once  dextrose 50% Injectable 25 Gram(s) IV Push once  enoxaparin Injectable 70 milliGRAM(s) SubCutaneous every 12 hours  famotidine    Tablet 20 milliGRAM(s) Oral daily  FLUoxetine 10 milliGRAM(s) Oral daily  gabapentin 300 milliGRAM(s) Oral at bedtime  hydrALAZINE 50 milliGRAM(s) Oral daily  insulin glargine Injectable (LANTUS) 30 Unit(s) SubCutaneous at bedtime  insulin lispro (HumaLOG) corrective regimen sliding scale   SubCutaneous three times a day before meals  insulin lispro (HumaLOG) corrective regimen sliding scale   SubCutaneous at bedtime  insulin lispro Injectable (HumaLOG) 3 Unit(s) SubCutaneous three times a day before meals  lidocaine   Patch 1 Patch Transdermal daily  melatonin 3 milliGRAM(s) Oral at bedtime  metFORMIN 500 milliGRAM(s) Oral two times a day with meals  metoprolol tartrate 25 milliGRAM(s) Oral every 12 hours  polyethylene glycol 3350 17 Gram(s) Oral daily  sodium chloride 0.65% Nasal 1 Spray(s) Both Nostrils four times a day    MEDICATIONS  (PRN):  acetaminophen   Tablet .. 650 milliGRAM(s) Oral every 6 hours PRN Temp greater or equal to 38C (100.4F), Mild Pain (1 - 3), Moderate Pain (4 - 6)  dextrose 40% Gel 15 Gram(s) Oral once PRN Blood Glucose LESS THAN 70 milliGRAM(s)/deciliter  glucagon  Injectable 1 milliGRAM(s) IntraMuscular once PRN Glucose LESS THAN 70 milligrams/deciliter  lactulose Syrup 10 Gram(s) Oral daily PRN constipation      Allergies    No Known Allergies    Intolerances            PHYSICAL EXAM  69y  Vital Signs Last 24 Hrs  T(C): 36.9 (04 May 2020 08:32), Max: 37.1 (04 May 2020 06:14)  T(F): 98.4 (04 May 2020 08:32), Max: 98.7 (04 May 2020 06:14)  HR: 74 (04 May 2020 08:32) (74 - 104)  BP: 118/74 (04 May 2020 08:32) (118/74 - 128/72)  BP(mean): --  RR: 16 (04 May 2020 08:32) (16 - 17)  SpO2: 96% (04 May 2020 08:32) (95% - 96%)  Daily         RECENT LABS:                          10.0   9.65  )-----------( 358      ( 04 May 2020 07:36 )             31.0     05-04    133<L>  |  97  |  26<H>  ----------------------------<  180<H>  4.8   |  29  |  0.96    Ca    9.9      04 May 2020 07:36    TPro  7.0  /  Alb  3.3  /  TBili  0.5  /  DBili  x   /  AST  20  /  ALT  20  /  AlkPhos  66  05-04    LIVER FUNCTIONS - ( 04 May 2020 07:36 )  Alb: 3.3 g/dL / Pro: 7.0 g/dL / ALK PHOS: 66 U/L / ALT: 20 U/L / AST: 20 U/L / GGT: x                   CAPILLARY BLOOD GLUCOSE      POCT Blood Glucose.: 269 mg/dL (04 May 2020 11:44)  POCT Blood Glucose.: 189 mg/dL (04 May 2020 07:38)  POCT Blood Glucose.: 158 mg/dL (03 May 2020 21:46)  POCT Blood Glucose.: 185 mg/dL (03 May 2020 16:27)

## 2020-05-04 NOTE — PROGRESS NOTE ADULT - SUBJECTIVE AND OBJECTIVE BOX
in the rehab center     working out    ros all are neg  no fc     no sob     Vital Signs Last 24 Hrs    T(C): 36.9 (04 May 2020 08:32), Max: 37.1 (04 May 2020 06:14)  T(F): 98.4 (04 May 2020 08:32), Max: 98.7 (04 May 2020 06:14)  HR: 74 (04 May 2020 08:32) (74 - 104)  BP: 118/74 (04 May 2020 08:32) (118/74 - 128/72)  BP(mean): --  RR: 16 (04 May 2020 08:32) (16 - 17)  SpO2: 96% (04 May 2020 08:32) (95% - 96%)    MEDICATIONS  (STANDING):    amLODIPine   Tablet 5 milliGRAM(s) Oral daily  aspirin enteric coated 81 milliGRAM(s) Oral daily  atorvastatin 10 milliGRAM(s) Oral at bedtime  BACItracin   Ointment 1 Application(s) Topical daily  benzonatate 100 milliGRAM(s) Oral every 8 hours  dextrose 5%. 1000 milliLiter(s) (50 mL/Hr) IV Continuous <Continuous>  dextrose 50% Injectable 12.5 Gram(s) IV Push once  dextrose 50% Injectable 25 Gram(s) IV Push once  dextrose 50% Injectable 25 Gram(s) IV Push once  enoxaparin Injectable 70 milliGRAM(s) SubCutaneous every 12 hours  famotidine    Tablet 20 milliGRAM(s) Oral daily  FLUoxetine 10 milliGRAM(s) Oral daily  gabapentin 300 milliGRAM(s) Oral at bedtime  hydrALAZINE 50 milliGRAM(s) Oral daily  insulin glargine Injectable (LANTUS) 30 Unit(s) SubCutaneous at bedtime  insulin lispro (HumaLOG) corrective regimen sliding scale   SubCutaneous three times a day before meals  insulin lispro (HumaLOG) corrective regimen sliding scale   SubCutaneous at bedtime  insulin lispro Injectable (HumaLOG) 3 Unit(s) SubCutaneous three times a day before meals  lidocaine   Patch 1 Patch Transdermal daily  melatonin 3 milliGRAM(s) Oral at bedtime  metFORMIN 500 milliGRAM(s) Oral two times a day with meals  metoprolol tartrate 25 milliGRAM(s) Oral every 12 hours  polyethylene glycol 3350 17 Gram(s) Oral daily  sodium chloride 0.65% Nasal 1 Spray(s) Both Nostrils four times a day                          10.0   9.65  )-----------( 358      ( 04 May 2020 07:36 )             31.0   05-04    133<L>  |  97  |  26<H>  ----------------------------<  180<H>  4.8   |  29  |  0.96    Ca    9.9      04 May 2020 07:36    TPro  7.0  /  Alb  3.3  /  TBili  0.5  /  DBili  x   /  AST  20  /  ALT  20  /  AlkPhos  66  05-04

## 2020-05-04 NOTE — PROGRESS NOTE ADULT - ASSESSMENT
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  - fall and aspiration precautions  - cont prozac for motor recovery  -right SAFO evalution with liner  -PRECAUTIONS: airborne, contact, aspiration, cardiac, fall    #right knee pain: anterior and suprapatellar effusion  -currently on therapeutic lovenox  -right knee effusion, likely due to instability and quad/ham weakness. Cold compress, ACE wrap PRN  -add lidoderm patch for pain daily - trial ibuprofen for righ tknee effusion with food  Doppler negative DVT   -Xray right knee  negative for acute fracture  -knee immobilizer during standing activities (buckling noted during OT)      #Acute Hypoxic Respiratory Failure secondary to COVID-19 PNA  Date of COVID testin20  - Hydroxychloroquine not given, pt with prolonged QTc  - EKG - QTc: 500 (), incomplete RBBB  - Incentive spirometry, robitussin DM, ocean spray  -O2 via NC 1-2 l keep O2 sat > 94%    #labs:  leukocytosis 12.3--> resolved 8.65   CBC -->10.29   CBC  --> 10.0    #HTN  - Lopressor 25 q12, amlodipine 5 qd, hydralazine 50mg qd  - BP controlled     #type II Mobitz block, known RBBB  - s/p Medtronic Micra AV+ leadless pacemaker  - outpt EP followup    #T2DM.  - SSI ACHS  - FS still uncontrolled 5.1  -diabetic educator consult appreciated :  ·	Start Metformin 500 mg w/ breakfast and dinner  ·	will decrease insulin doses to avoid potential hypoglycemia.  Will adjust up or down as needed in response to metformin  ·	Decrease Glargine from 37 to 30 units at bedtime  ·	Decrease Lispro from 5 to 3  units three times a day before meals w/ medium correction.    #HLD  - cont simvastatin 20 qhs    #mood/sleep  - Prozac 10  - melatonin 3    #Pain Mgmt   - Tylenol PRN    #GI/Bowel Mgmt   - Continent  - pepcid    #/Bladder Mgmt   - Continent, PVR    #FEN   - Diet - Regular + Thins  [CCHO]  DASH/TLC  -BUn/Cr 25/1.03  encourage po fluids    #skin:  abrasion back. bacitracin and DSD daily     #- DVT PPX  : Lovenox 70mg Q12h    #Case discussed in IDT rounds :  -patient requires min assist UE/mod LEcodiers max assist. max assist toileting. Noted to have deficitsi n problem-solving, cognition independent of aphasia  -goals: CG bADLs, mod assist iADLs  -target dc home  with caregiver support and home Pt and OT, SLP.  -will need caregiver training    #LABS  CBC, BMP   monitor FS ASSESSMENT/PLAN  SHIREEN CASH is a 70yo Female with HTN, HLD, T2DM, hx CVA, GERD, known RBBB with COVID-19 debility and Left frontal and parietla CVA with residual right HP, foot drop, patricio inattention aphasia    #Left frontal/parietal CVA  - continue  Comprehensive Rehab Program of PT/OT/SLP  - cont ASA, lovenox therapeutic dose as may be hypercoagulable due to COVID.  - fall and aspiration precautions  - cont prozac for motor recovery  -right SAFO evalution with liner  -PRECAUTIONS: airborne, contact, aspiration, cardiac, fall    #right knee pain: anterior and suprapatellar effusion  -currently on therapeutic lovenox  -right knee effusion, likely due to instability and quad/ham weakness. Cold compress, ACE wrap PRN  -add lidoderm patch for pain daily - limited trial ibuprofen for righ tknee effusion with food. Discussed with patient  Doppler negative DVT   -Xray right knee  negative for acute fracture  -knee immobilizer during standing activities (buckling noted during OT)      #Acute Hypoxic Respiratory Failure secondary to COVID-19 PNA  Date of COVID testin20  - Hydroxychloroquine not given, pt with prolonged QTc  - EKG - QTc: 500 (), incomplete RBBB  - Incentive spirometry, robitussin DM, ocean spray  -O2 via NC 1-2 l keep O2 sat > 94%    #labs:  leukocytosis 12.3--> resolved 8.65   CBC -->10.29   CBC  --> 10.0    #HTN  - Lopressor 25 q12, amlodipine 5 qd, hydralazine 50mg qd  - BP controlled     #type II Mobitz block, known RBBB  - s/p Medtronic Micra AV+ leadless pacemaker  - outpt EP followup    #T2DM.  - SSI ACHS  -diabetic educator consult appreciated :  ·	Start Metformin 500 mg w/ breakfast and dinner  ·	will decrease insulin doses to avoid potential hypoglycemia.  Will adjust up or down as needed in response to metformin  ·	Decrease Glargine from 37 to 30 units at bedtime  ·	Decrease Lispro from 5 to 3  units three times a day before meals w/ medium correction.  -monitor FS. Still periods elevation although overall better controlled     #HLD  - cont simvastatin 20 qhs    #mood/sleep  - Prozac 10  - melatonin 3    #Pain Mgmt   - Tylenol PRN    #GI/Bowel Mgmt   - Continent  - pepcid    #/Bladder Mgmt   - Continent, PVR    #FEN   - Diet - Regular + Thins  [CCHO]  DASH/TLC  -BUn/Cr 25/1.03-->26/0.96   encourage po fluids  BMp     #skin:  abrasion back. bacitracin and DSD daily     #- DVT PPX  : Lovenox 70mg Q12h    #Case discussed in IDT rounds :  -patient requires min assist UE/mod LE, trasnfers max assist. max assist toileting. Noted to have deficitsi n problem-solving, cognition independent of aphasia  -goals: CG bADLs, mod assist iADLs  -target dc home  with caregiver support and home Pt and OT, SLP.  -will need caregiver training    #LABS  CBC, BMP   monitor FS

## 2020-05-04 NOTE — PROGRESS NOTE ADULT - COMMENTS
Patient seen with language line  Donald ibanez. Patient reports that rle pain has improved overal, Swelling better. No acute event overnight. No fever or cough in AM Patient seen with JOSE LUIS Mancia translating in Maltese. Patient reports that rle pain has improved overall,     Swelling better. No acute event overnight. No fever or cough in AM. Denies constipation or dysuria. mood seems improved, comfortable

## 2020-05-04 NOTE — PROGRESS NOTE ADULT - EXTREMITIES COMMENTS
right calf swelling improved. non tender with ROM right calf swelling improved. non tender with ROM  some mild tibialis anteior TTP  effusion right knee nearly resolved, no joint line TTP  no pain with PROm knee including full extension

## 2020-05-04 NOTE — PROGRESS NOTE ADULT - ASSESSMENT
68yo F essential HTN, HLD, T2DM, hx CVA, GERD, known RBBB with COVID-19 debility and Left frontal and parietal CVA with residual right HP, foot drop, patricio inattention aphasia  - cont ASA, lovenox therapeutic dose as may be hypercoagulable due to COVID. (neg pfo)   - cont prozac for motor recovery  -right SAFO evalution with liner  clinical improvement noted       #right calf pain continues  doppler RLE negative,   -currently on therapeutic lovenox  pain control     #Acute Hypoxic Respiratory Failure secondary to COVID-19 PNA  Date of COVID testin20 resolving   keep 02 sat approx 94%   monitor rr pattern    #Essential HTN controlled  - Lopressor  amlodipine  hydralazine     #type II Mobitz block, known RBBB  - s/p Medtronic Micra AV+ leadless pacemaker  - outpt EP followup    #T2DM controlled now   continue the current lantus dosing   - iss, accuchecks    #HLD  - cont simvastatin 20 qhs    #mood/sleep controlled  - Prozac 10  - melatonin 3    #- DVT PPX  : Lovenox 70mg Q12h    Reviewed w patient plan of care   Reviewed w rehab team plan of care   full code

## 2020-05-05 PROCEDURE — 99233 SBSQ HOSP IP/OBS HIGH 50: CPT

## 2020-05-05 PROCEDURE — 99232 SBSQ HOSP IP/OBS MODERATE 35: CPT

## 2020-05-05 RX ADMIN — LIDOCAINE 1 PATCH: 4 CREAM TOPICAL at 02:21

## 2020-05-05 RX ADMIN — Medication 3 UNIT(S): at 07:58

## 2020-05-05 RX ADMIN — AMLODIPINE BESYLATE 5 MILLIGRAM(S): 2.5 TABLET ORAL at 07:58

## 2020-05-05 RX ADMIN — METFORMIN HYDROCHLORIDE 500 MILLIGRAM(S): 850 TABLET ORAL at 08:01

## 2020-05-05 RX ADMIN — Medication 3 UNIT(S): at 17:41

## 2020-05-05 RX ADMIN — Medication 400 MILLIGRAM(S): at 12:05

## 2020-05-05 RX ADMIN — Medication 81 MILLIGRAM(S): at 12:05

## 2020-05-05 RX ADMIN — Medication 50 MILLIGRAM(S): at 06:36

## 2020-05-05 RX ADMIN — Medication 2: at 12:04

## 2020-05-05 RX ADMIN — Medication 1 APPLICATION(S): at 14:48

## 2020-05-05 RX ADMIN — Medication 1 APPLICATION(S): at 06:37

## 2020-05-05 RX ADMIN — LIDOCAINE 1 PATCH: 4 CREAM TOPICAL at 12:06

## 2020-05-05 RX ADMIN — POLYETHYLENE GLYCOL 3350 17 GRAM(S): 17 POWDER, FOR SOLUTION ORAL at 12:06

## 2020-05-05 RX ADMIN — Medication 1 SPRAY(S): at 00:28

## 2020-05-05 RX ADMIN — Medication 25 MILLIGRAM(S): at 17:47

## 2020-05-05 RX ADMIN — INSULIN GLARGINE 30 UNIT(S): 100 INJECTION, SOLUTION SUBCUTANEOUS at 22:37

## 2020-05-05 RX ADMIN — GABAPENTIN 300 MILLIGRAM(S): 400 CAPSULE ORAL at 22:36

## 2020-05-05 RX ADMIN — FAMOTIDINE 20 MILLIGRAM(S): 10 INJECTION INTRAVENOUS at 12:05

## 2020-05-05 RX ADMIN — Medication 3 MILLIGRAM(S): at 22:37

## 2020-05-05 RX ADMIN — LIDOCAINE 1 PATCH: 4 CREAM TOPICAL at 17:52

## 2020-05-05 RX ADMIN — METFORMIN HYDROCHLORIDE 500 MILLIGRAM(S): 850 TABLET ORAL at 17:48

## 2020-05-05 RX ADMIN — Medication 1 SPRAY(S): at 17:52

## 2020-05-05 RX ADMIN — Medication 1 SPRAY(S): at 06:37

## 2020-05-05 RX ADMIN — Medication 3 UNIT(S): at 12:05

## 2020-05-05 RX ADMIN — ATORVASTATIN CALCIUM 10 MILLIGRAM(S): 80 TABLET, FILM COATED ORAL at 22:36

## 2020-05-05 RX ADMIN — Medication 100 MILLIGRAM(S): at 06:36

## 2020-05-05 RX ADMIN — Medication 100 MILLIGRAM(S): at 12:05

## 2020-05-05 RX ADMIN — ENOXAPARIN SODIUM 70 MILLIGRAM(S): 100 INJECTION SUBCUTANEOUS at 17:47

## 2020-05-05 RX ADMIN — Medication 10 MILLIGRAM(S): at 15:29

## 2020-05-05 RX ADMIN — ENOXAPARIN SODIUM 70 MILLIGRAM(S): 100 INJECTION SUBCUTANEOUS at 06:36

## 2020-05-05 RX ADMIN — Medication 1 SPRAY(S): at 14:49

## 2020-05-05 RX ADMIN — Medication 100 MILLIGRAM(S): at 22:36

## 2020-05-05 RX ADMIN — Medication 1 APPLICATION(S): at 17:52

## 2020-05-05 RX ADMIN — Medication 25 MILLIGRAM(S): at 06:36

## 2020-05-05 NOTE — PROGRESS NOTE ADULT - EXTREMITIES COMMENTS
small ecchymoses right lateral proximal calf resolving  no swelling ankle or warmth but TTP along lateral malleolus and laxity with stressing  no TTP left ankle 0.5 DF

## 2020-05-05 NOTE — PROGRESS NOTE ADULT - SUBJECTIVE AND OBJECTIVE BOX
Patient is a 69y old  Female who presents with a chief complaint of left frontal parietal CVA with right HP and aphasia, COVID+ 4/8/20 (04 May 2020 12:48)      HPI:  SHIREEN CASH is a 68yo female RH dominant with PMH of HTN, HLD, T2DM, hx CVA, GERD, known RBBB presented to [facility] ED on 4/8/20 with complaints of fever, chills, cough, chest pain, SOB for 5 days. Per patient, her  tested +COVID at an outpatient testing facility a few days prior, patient herself had tested negative at the time. They continued to share the same living space. ED workup showed +COVID and Mobitz type II block. EP was consulted and patient is s/p Medtronic Micra AV+ leadless pacemaker implantation via right femoral vein on 4/10/20. Hydroxychloroquine presumably not given due to prolonged QTc.     On 4/16/20, patient developed acute aphasia and right sided weakness. CT head showed an acute LEFT frontal/parietal CVA. CTA head/neck showed LEFT ICA stenosis but no occlusion. tPA was not administered due to timing and no penumbra. Placed on full dose lovenox as thoguht to be hypercoagulable secondary to COVID. Patient was cleared for regular consistency solids, thin liquids. Transferred to [place] for acute inpatient rehab services 4/23/20. (23 Apr 2020 14:26)      PAST MEDICAL & SURGICAL HISTORY:  H/O gastroesophageal reflux (GERD)  HTN (hypertension)  DM (diabetes mellitus)  History of benign brain tumor      MEDICATIONS  (STANDING):  amLODIPine   Tablet 5 milliGRAM(s) Oral daily  ammonium lactate 12% Lotion 1 Application(s) Topical two times a day  aspirin enteric coated 81 milliGRAM(s) Oral daily  atorvastatin 10 milliGRAM(s) Oral at bedtime  BACItracin   Ointment 1 Application(s) Topical daily  benzonatate 100 milliGRAM(s) Oral every 8 hours  dextrose 5%. 1000 milliLiter(s) (50 mL/Hr) IV Continuous <Continuous>  dextrose 50% Injectable 12.5 Gram(s) IV Push once  dextrose 50% Injectable 25 Gram(s) IV Push once  dextrose 50% Injectable 25 Gram(s) IV Push once  enoxaparin Injectable 70 milliGRAM(s) SubCutaneous every 12 hours  famotidine    Tablet 20 milliGRAM(s) Oral daily  FLUoxetine 10 milliGRAM(s) Oral daily  gabapentin 300 milliGRAM(s) Oral at bedtime  hydrALAZINE 50 milliGRAM(s) Oral daily  insulin glargine Injectable (LANTUS) 30 Unit(s) SubCutaneous at bedtime  insulin lispro (HumaLOG) corrective regimen sliding scale   SubCutaneous three times a day before meals  insulin lispro (HumaLOG) corrective regimen sliding scale   SubCutaneous at bedtime  insulin lispro Injectable (HumaLOG) 3 Unit(s) SubCutaneous three times a day before meals  lidocaine   Patch 1 Patch Transdermal daily  melatonin 3 milliGRAM(s) Oral at bedtime  metFORMIN 500 milliGRAM(s) Oral two times a day with meals  metoprolol tartrate 25 milliGRAM(s) Oral every 12 hours  polyethylene glycol 3350 17 Gram(s) Oral daily  sodium chloride 0.65% Nasal 1 Spray(s) Both Nostrils four times a day    MEDICATIONS  (PRN):  acetaminophen   Tablet .. 650 milliGRAM(s) Oral every 6 hours PRN Temp greater or equal to 38C (100.4F), Mild Pain (1 - 3)  dextrose 40% Gel 15 Gram(s) Oral once PRN Blood Glucose LESS THAN 70 milliGRAM(s)/deciliter  glucagon  Injectable 1 milliGRAM(s) IntraMuscular once PRN Glucose LESS THAN 70 milligrams/deciliter  ibuprofen  Tablet. 400 milliGRAM(s) Oral every 12 hours PRN Moderate Pain (4 - 6)  lactulose Syrup 10 Gram(s) Oral daily PRN constipation      Allergies    No Known Allergies    Intolerances            PHYSICAL EXAM  69y  Vital Signs Last 24 Hrs  T(C): 37 (05 May 2020 07:51), Max: 37 (04 May 2020 22:33)  T(F): 98.6 (05 May 2020 07:51), Max: 98.6 (04 May 2020 22:33)  HR: 71 (05 May 2020 07:51) (69 - 85)  BP: 111/60 (05 May 2020 07:51) (111/60 - 160/74)  BP(mean): --  RR: 14 (05 May 2020 07:51) (14 - 15)  SpO2: 95% (05 May 2020 07:51) (94% - 95%)  Daily     Daily       RECENT LABS:                          10.0   9.65  )-----------( 358      ( 04 May 2020 07:36 )             31.0     05-04    133<L>  |  97  |  26<H>  ----------------------------<  180<H>  4.8   |  29  |  0.96    Ca    9.9      04 May 2020 07:36    TPro  7.0  /  Alb  3.3  /  TBili  0.5  /  DBili  x   /  AST  20  /  ALT  20  /  AlkPhos  66  05-04    LIVER FUNCTIONS - ( 04 May 2020 07:36 )  Alb: 3.3 g/dL / Pro: 7.0 g/dL / ALK PHOS: 66 U/L / ALT: 20 U/L / AST: 20 U/L / GGT: x                   CAPILLARY BLOOD GLUCOSE      POCT Blood Glucose.: 135 mg/dL (05 May 2020 07:55)  POCT Blood Glucose.: 186 mg/dL (04 May 2020 22:27)  POCT Blood Glucose.: 150 mg/dL (04 May 2020 16:53)  POCT Blood Glucose.: 269 mg/dL (04 May 2020 11:44) Patient is a 69y old  Female who presents with a chief complaint of left frontal parietal CVA with right HP and aphasia, COVID+ 4/8/20 (04 May 2020 12:48)      HPI:  SHIREEN CASH is a 68yo female RH dominant with PMH of HTN, HLD, T2DM, hx CVA, GERD, known RBBB presented to [facility] ED on 4/8/20 with complaints of fever, chills, cough, chest pain, SOB for 5 days. Per patient, her  tested +COVID at an outpatient testing facility a few days prior, patient herself had tested negative at the time. They continued to share the same living space. ED workup showed +COVID and Mobitz type II block. EP was consulted and patient is s/p Medtronic Micra AV+ leadless pacemaker implantation via right femoral vein on 4/10/20. Hydroxychloroquine presumably not given due to prolonged QTc.     On 4/16/20, patient developed acute aphasia and right sided weakness. CT head showed an acute LEFT frontal/parietal CVA. CTA head/neck showed LEFT ICA stenosis but no occlusion. tPA was not administered due to timing and no penumbra. Placed on full dose lovenox as thoguht to be hypercoagulable secondary to COVID. Patient was cleared for regular consistency solids, thin liquids. Transferred to [facility] for acute inpatient rehab services 4/23/20. (23 Apr 2020 14:26)      PAST MEDICAL & SURGICAL HISTORY:  H/O gastroesophageal reflux (GERD)  HTN (hypertension)  DM (diabetes mellitus)  History of benign brain tumor      MEDICATIONS  (STANDING):  amLODIPine   Tablet 5 milliGRAM(s) Oral daily  ammonium lactate 12% Lotion 1 Application(s) Topical two times a day  aspirin enteric coated 81 milliGRAM(s) Oral daily  atorvastatin 10 milliGRAM(s) Oral at bedtime  BACItracin   Ointment 1 Application(s) Topical daily  benzonatate 100 milliGRAM(s) Oral every 8 hours  dextrose 5%. 1000 milliLiter(s) (50 mL/Hr) IV Continuous <Continuous>  dextrose 50% Injectable 12.5 Gram(s) IV Push once  dextrose 50% Injectable 25 Gram(s) IV Push once  dextrose 50% Injectable 25 Gram(s) IV Push once  enoxaparin Injectable 70 milliGRAM(s) SubCutaneous every 12 hours  famotidine    Tablet 20 milliGRAM(s) Oral daily  FLUoxetine 10 milliGRAM(s) Oral daily  gabapentin 300 milliGRAM(s) Oral at bedtime  hydrALAZINE 50 milliGRAM(s) Oral daily  insulin glargine Injectable (LANTUS) 30 Unit(s) SubCutaneous at bedtime  insulin lispro (HumaLOG) corrective regimen sliding scale   SubCutaneous three times a day before meals  insulin lispro (HumaLOG) corrective regimen sliding scale   SubCutaneous at bedtime  insulin lispro Injectable (HumaLOG) 3 Unit(s) SubCutaneous three times a day before meals  lidocaine   Patch 1 Patch Transdermal daily  melatonin 3 milliGRAM(s) Oral at bedtime  metFORMIN 500 milliGRAM(s) Oral two times a day with meals  metoprolol tartrate 25 milliGRAM(s) Oral every 12 hours  polyethylene glycol 3350 17 Gram(s) Oral daily  sodium chloride 0.65% Nasal 1 Spray(s) Both Nostrils four times a day    MEDICATIONS  (PRN):  acetaminophen   Tablet .. 650 milliGRAM(s) Oral every 6 hours PRN Temp greater or equal to 38C (100.4F), Mild Pain (1 - 3)  dextrose 40% Gel 15 Gram(s) Oral once PRN Blood Glucose LESS THAN 70 milliGRAM(s)/deciliter  glucagon  Injectable 1 milliGRAM(s) IntraMuscular once PRN Glucose LESS THAN 70 milligrams/deciliter  ibuprofen  Tablet. 400 milliGRAM(s) Oral every 12 hours PRN Moderate Pain (4 - 6)  lactulose Syrup 10 Gram(s) Oral daily PRN constipation      Allergies    No Known Allergies    Intolerances            PHYSICAL EXAM  69y  Vital Signs Last 24 Hrs  T(C): 37 (05 May 2020 07:51), Max: 37 (04 May 2020 22:33)  T(F): 98.6 (05 May 2020 07:51), Max: 98.6 (04 May 2020 22:33)  HR: 71 (05 May 2020 07:51) (69 - 85)  BP: 111/60 (05 May 2020 07:51) (111/60 - 160/74)  BP(mean): --  RR: 14 (05 May 2020 07:51) (14 - 15)  SpO2: 95% (05 May 2020 07:51) (94% - 95%)  Daily     Daily       RECENT LABS:                          10.0   9.65  )-----------( 358      ( 04 May 2020 07:36 )             31.0     05-04    133<L>  |  97  |  26<H>  ----------------------------<  180<H>  4.8   |  29  |  0.96    Ca    9.9      04 May 2020 07:36    TPro  7.0  /  Alb  3.3  /  TBili  0.5  /  DBili  x   /  AST  20  /  ALT  20  /  AlkPhos  66  05-04    LIVER FUNCTIONS - ( 04 May 2020 07:36 )  Alb: 3.3 g/dL / Pro: 7.0 g/dL / ALK PHOS: 66 U/L / ALT: 20 U/L / AST: 20 U/L / GGT: x                   CAPILLARY BLOOD GLUCOSE      POCT Blood Glucose.: 135 mg/dL (05 May 2020 07:55)  POCT Blood Glucose.: 186 mg/dL (04 May 2020 22:27)  POCT Blood Glucose.: 150 mg/dL (04 May 2020 16:53)  POCT Blood Glucose.: 269 mg/dL (04 May 2020 11:44)

## 2020-05-05 NOTE — PROGRESS NOTE ADULT - ASSESSMENT
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  - fall and aspiration precautions  - cont prozac for motor recovery  -right SAFO evalution with liner  -PRECAUTIONS: airborne, contact, aspiration, cardiac, fall    #right knee pain: anterior and suprapatellar effusion  -currently on therapeutic lovenox  -right knee effusion, likely due to instability and quad/ham weakness. Cold compress, ACE wrap PRN  -add lidoderm patch for pain daily - limited trial ibuprofen for right knee effusion with food. Discussed with patient  Doppler negative DVT   -Xray right knee  negative for acute fracture  -knee immobilizer during standing activities (buckling noted during OT)      #Acute Hypoxic Respiratory Failure secondary to COVID-19 PNA  Date of COVID testin20  - Hydroxychloroquine not given, pt with prolonged QTc  - EKG - QTc: 500 (), incomplete RBBB  - Incentive spirometry, robitussin DM, ocean spray  -patient tolerating therapies without o2    #labs:  leukocytosis 12.3--> resolved 8.65   CBC -->10.29 --> ( ) 10.0    #HTN  - Lopressor 25 q12, amlodipine 5 qd, hydralazine 50mg qd  - BP controlled     #type II Mobitz block, known RBBB  - s/p Medtronic Micra AV+ leadless pacemaker  - outpt EP followup    #T2DM.  - SSI ACHS  -diabetic educator consult appreciated :  ·	Start Metformin 500 mg w/ breakfast and dinner  ·	will decrease insulin doses to avoid potential hypoglycemia.  Will adjust up or down as needed in response to metformin  ·	Decrease Glargine from 37 to 30 units at bedtime  ·	Decrease Lispro from 5 to 3  units three times a day before meals w/ medium correction.  -monitor FS. Still periods elevation although overall better controlled     #HLD  - cont simvastatin 20 qhs    #mood/sleep  - Prozac 10  - melatonin 3    #Pain Mgmt   - Tylenol PRN    #GI/Bowel Mgmt   - Continent  - pepcid    #/Bladder Mgmt   - Continent, PVR    #FEN   - Diet - Regular + Thins  [CCHO]  DASH/TLC  -BUn/Cr 25/1.03-->26/0.96   encourage po fluids  BMp     #skin:  abrasion back. bacitracin and DSD daily     #- DVT PPX  - Lovenox 70mg Q12h    #Case discussed in IDT rounds :  -patient requires min assist UE/mod LE, trasnfers max assist. max assist toileting. Noted to have deficitsi n problem-solving, cognition independent of aphasia  -goals: CG bADLs, mod assist iADLs  -target dc home  with caregiver support and home Pt and OT, SLP.  -will need caregiver training    #LABS  CBC, BMP   monitor FS ASSESSMENT/PLAN  SHIREEN ACSH is a 68yo Female with HTN, HLD, T2DM, hx CVA, GERD, known RBBB with COVID-19 debility and Left frontal and parietla CVA with residual right HP, foot drop, patricio inattention aphasia    #Left frontal/parietal CVA  - continue  Comprehensive Rehab Program of PT/OT/SLP  - cont ASA, lovenox therapeutic dose as may be hypercoagulable due to COVID.  - fall and aspiration precautions  - cont prozac for motor recovery  -right SAFO evalution with liner for support given foot drop and inversion  -PRECAUTIONS: airborne, contact, aspiration, cardiac, fall    #right knee pain: anterior and suprapatellar effusion  -currently on therapeutic lovenox  -right knee effusion, likely due to instability and quad/ham weakness. Cold compress, ACE wrap PRN  -add lidoderm patch for pain daily - limited trial ibuprofen for right knee effusion/ankle pain with food . Discussed with patient  Doppler negative DVT   -Xray right knee  negative for acute fracture  -knee immobilizer during standing activities (buckling noted during OT)      #Acute Hypoxic Respiratory Failure secondary to COVID-19 PNA  Date of COVID testin20  - Hydroxychloroquine not given, pt with prolonged QTc  - EKG - QTc: 500 (), incomplete RBBB  - Incentive spirometry, robitussin DM, ocean spray  -patient tolerating therapies without o2    #labs:  leukocytosis 12.3--> resolved 8.65   CBC -->10.29 --> ( ) 10.0    #HTN  - Lopressor 25 q12, amlodipine 5 qd, hydralazine 50mg qd  - BP controlled     #type II Mobitz block, known RBBB  - s/p Medtronic Micra AV+ leadless pacemaker  - outpt EP followup    #T2DM.  - SSI ACHS  -diabetic educator consult appreciated :  ·	Start Metformin 500 mg w/ breakfast and dinner  ·	will decrease insulin doses to avoid potential hypoglycemia.  Will adjust up or down as needed in response to metformin  ·	Decrease Glargine from 37 to 30 units at bedtime  ·	Decrease Lispro from 5 to 3  units three times a day before meals w/ medium correction.  -Fs controlled     #HLD  - cont simvastatin 20 qhs    #mood/sleep  - Prozac 10  - melatonin 3    #Pain Mgmt   - Tylenol PRN    #GI/Bowel Mgmt   - Continent  - pepcid    #/Bladder Mgmt   - Continent, PVR    #FEN   - Diet - Regular + Thins  [CCHO]  DASH/TLC  -BUn/Cr 25/1.03-->26/0.96   encourage po fluids  BMp     #skin:  abrasion back. bacitracin and DSD daily     #- DVT PPX  - Lovenox 70mg Q12h    #Case discussed in IDT rounds :  -patient requires min assist UE/mod LE, trasnfers max assist. max assist toileting. Noted to have deficitsi n problem-solving, cognition independent of aphasia  -goals: CG bADLs, mod assist iADLs  -target dc home  with caregiver support and home Pt and OT, SLP.  -will need caregiver training    #LABS  CBC, BMP   monitor FS

## 2020-05-05 NOTE — PROGRESS NOTE ADULT - SUBJECTIVE AND OBJECTIVE BOX
seen and examined at bedside    seen with the OT PT team this am     ROS all others are negative     Vital Signs Last 24 Hrs    T(C): 37 (05 May 2020 07:51), Max: 37 (04 May 2020 22:33)  T(F): 98.6 (05 May 2020 07:51), Max: 98.6 (04 May 2020 22:33)  HR: 71 (05 May 2020 07:51) (69 - 85)  BP: 111/60 (05 May 2020 07:51) (111/60 - 160/74)  BP(mean): --  RR: 14 (05 May 2020 07:51) (14 - 15)  SpO2: 95% (05 May 2020 07:51) (94% - 95%)    MEDICATIONS  (STANDING):    amLODIPine   Tablet 5 milliGRAM(s) Oral daily  ammonium lactate 12% Lotion 1 Application(s) Topical two times a day  aspirin enteric coated 81 milliGRAM(s) Oral daily  atorvastatin 10 milliGRAM(s) Oral at bedtime  BACItracin   Ointment 1 Application(s) Topical daily  benzonatate 100 milliGRAM(s) Oral every 8 hours  dextrose 5%. 1000 milliLiter(s) (50 mL/Hr) IV Continuous <Continuous>  dextrose 50% Injectable 12.5 Gram(s) IV Push once  dextrose 50% Injectable 25 Gram(s) IV Push once  dextrose 50% Injectable 25 Gram(s) IV Push once  enoxaparin Injectable 70 milliGRAM(s) SubCutaneous every 12 hours  famotidine    Tablet 20 milliGRAM(s) Oral daily  FLUoxetine 10 milliGRAM(s) Oral daily  gabapentin 300 milliGRAM(s) Oral at bedtime  hydrALAZINE 50 milliGRAM(s) Oral daily  insulin glargine Injectable (LANTUS) 30 Unit(s) SubCutaneous at bedtime  insulin lispro (HumaLOG) corrective regimen sliding scale   SubCutaneous three times a day before meals  insulin lispro (HumaLOG) corrective regimen sliding scale   SubCutaneous at bedtime  insulin lispro Injectable (HumaLOG) 3 Unit(s) SubCutaneous three times a day before meals  lidocaine   Patch 1 Patch Transdermal daily  melatonin 3 milliGRAM(s) Oral at bedtime  metFORMIN 500 milliGRAM(s) Oral two times a day with meals  metoprolol tartrate 25 milliGRAM(s) Oral every 12 hours  polyethylene glycol 3350 17 Gram(s) Oral daily  sodium chloride 0.65% Nasal 1 Spray(s) Both Nostrils four times a day    CBC Full  -  ( 04 May 2020 07:36 )  WBC Count : 9.65 K/uL  RBC Count : 3.50 M/uL  Hemoglobin : 10.0 g/dL  Hematocrit : 31.0 %  Platelet Count - Automated : 358 K/uL  Mean Cell Volume : 88.6 fl  Mean Cell Hemoglobin : 28.6 pg  Mean Cell Hemoglobin Concentration : 32.3 gm/dL  Auto Neutrophil # : 5.58 K/uL  Auto Lymphocyte # : 2.57 K/uL  Auto Monocyte # : 0.91 K/uL  Auto Eosinophil # : 0.41 K/uL  Auto Basophil # : 0.05 K/uL  Auto Neutrophil % : 58.0 %  Auto Lymphocyte % : 26.6 %  Auto Monocyte % : 9.4 %  Auto Eosinophil % : 4.2 %  Auto Basophil % : 0.5 %    05-04    133<L>  |  97  |  26<H>  ----------------------------<  180<H>  4.8   |  29  |  0.96    Ca    9.9      04 May 2020 07:36    TPro  7.0  /  Alb  3.3  /  TBili  0.5  /  DBili  x   /  AST  20  /  ALT  20  /  AlkPhos  66  05-04

## 2020-05-05 NOTE — PROGRESS NOTE ADULT - ASSESSMENT
68 yo F essential HTN, HLD, T2DM, hx CVA, GERD, known RBBB with COVID-19 debility and Left frontal and parietal CVA with residual right HP, foot drop, patricio inattention aphasia  - cont ASA, lovenox therapeutic dose as may be hypercoagulable due to COVID. (neg pfo)   - cont prozac for motor recovery  -right SAFO evalution with liner  clinical improvement noted       #right calf pain continues  doppler RLE negative,   -currently on therapeutic lovenox  pain control     #Acute Hypoxic Respiratory Failure secondary to COVID-19 PNA  Date of COVID testin20 resolving   keep 02 sat approx 94%   monitor rr pattern    #Essential HTN controlled  - Lopressor  amlodipine  hydralazine     #type II Mobitz block, known RBBB  - s/p Medtronic Micra AV+ leadless pacemaker  - outpt EP followup    #T2DM controlled now   continue the current lantus dosing   - iss, accuchecks    #HLD  - cont simvastatin 20 qhs    #mood/sleep controlled  - Prozac 10  - melatonin 3    #- DVT PPX  : Lovenox 70mg Q12h    Reviewed w patient plan of care   Reviewed w rehab team plan of care   full code

## 2020-05-05 NOTE — PROGRESS NOTE ADULT - COMMENTS
Patient seen with PCA translating in Burkinan.  No acute event overnight. Denies constipation or dysuria. Patient reports that rle knee pain has improved overall,but she indorse some pain in the right ankle. Patient able to tolerate therapies without use of oxygen. Patient seen with PCA Grisel translating in Stateless.  No acute event overnight. Denies constipation or dysuria. Patient reports that rle knee pain has improved overall ,but she indorse some pain in the right ankle. Patient able to tolerate therapies without use of oxygen. To dc today, discussed with patient    continues to have expressive language deficits and answering open-ended questions given aphasia

## 2020-05-06 PROCEDURE — 99232 SBSQ HOSP IP/OBS MODERATE 35: CPT

## 2020-05-06 PROCEDURE — 99233 SBSQ HOSP IP/OBS HIGH 50: CPT

## 2020-05-06 RX ORDER — LACTULOSE 10 G/15ML
10 SOLUTION ORAL ONCE
Refills: 0 | Status: COMPLETED | OUTPATIENT
Start: 2020-05-06 | End: 2020-05-06

## 2020-05-06 RX ADMIN — Medication 1 SPRAY(S): at 00:36

## 2020-05-06 RX ADMIN — ENOXAPARIN SODIUM 70 MILLIGRAM(S): 100 INJECTION SUBCUTANEOUS at 17:32

## 2020-05-06 RX ADMIN — LIDOCAINE 1 PATCH: 4 CREAM TOPICAL at 19:27

## 2020-05-06 RX ADMIN — METFORMIN HYDROCHLORIDE 500 MILLIGRAM(S): 850 TABLET ORAL at 08:07

## 2020-05-06 RX ADMIN — Medication 2: at 08:00

## 2020-05-06 RX ADMIN — Medication 1 SPRAY(S): at 13:01

## 2020-05-06 RX ADMIN — Medication 25 MILLIGRAM(S): at 17:33

## 2020-05-06 RX ADMIN — Medication 1 SPRAY(S): at 06:31

## 2020-05-06 RX ADMIN — Medication 81 MILLIGRAM(S): at 13:00

## 2020-05-06 RX ADMIN — Medication 100 MILLIGRAM(S): at 06:31

## 2020-05-06 RX ADMIN — ATORVASTATIN CALCIUM 10 MILLIGRAM(S): 80 TABLET, FILM COATED ORAL at 21:53

## 2020-05-06 RX ADMIN — ENOXAPARIN SODIUM 70 MILLIGRAM(S): 100 INJECTION SUBCUTANEOUS at 06:31

## 2020-05-06 RX ADMIN — Medication 1 SPRAY(S): at 17:33

## 2020-05-06 RX ADMIN — Medication 3 MILLIGRAM(S): at 21:55

## 2020-05-06 RX ADMIN — Medication 1 APPLICATION(S): at 06:37

## 2020-05-06 RX ADMIN — Medication 1 APPLICATION(S): at 13:00

## 2020-05-06 RX ADMIN — INSULIN GLARGINE 30 UNIT(S): 100 INJECTION, SOLUTION SUBCUTANEOUS at 21:53

## 2020-05-06 RX ADMIN — Medication 1 APPLICATION(S): at 17:33

## 2020-05-06 RX ADMIN — Medication 50 MILLIGRAM(S): at 06:31

## 2020-05-06 RX ADMIN — Medication 3 UNIT(S): at 08:00

## 2020-05-06 RX ADMIN — Medication 100 MILLIGRAM(S): at 21:53

## 2020-05-06 RX ADMIN — Medication 100 MILLIGRAM(S): at 13:00

## 2020-05-06 RX ADMIN — GABAPENTIN 300 MILLIGRAM(S): 400 CAPSULE ORAL at 21:53

## 2020-05-06 RX ADMIN — FAMOTIDINE 20 MILLIGRAM(S): 10 INJECTION INTRAVENOUS at 13:00

## 2020-05-06 RX ADMIN — LIDOCAINE 1 PATCH: 4 CREAM TOPICAL at 12:50

## 2020-05-06 RX ADMIN — METFORMIN HYDROCHLORIDE 500 MILLIGRAM(S): 850 TABLET ORAL at 17:32

## 2020-05-06 RX ADMIN — Medication 3 UNIT(S): at 13:57

## 2020-05-06 RX ADMIN — AMLODIPINE BESYLATE 5 MILLIGRAM(S): 2.5 TABLET ORAL at 06:31

## 2020-05-06 RX ADMIN — Medication 10 MILLIGRAM(S): at 13:01

## 2020-05-06 RX ADMIN — Medication 25 MILLIGRAM(S): at 06:31

## 2020-05-06 RX ADMIN — Medication 10 MILLIGRAM(S): at 12:14

## 2020-05-06 RX ADMIN — Medication 3 UNIT(S): at 17:32

## 2020-05-06 RX ADMIN — LIDOCAINE 1 PATCH: 4 CREAM TOPICAL at 00:35

## 2020-05-06 NOTE — PROGRESS NOTE ADULT - SUBJECTIVE AND OBJECTIVE BOX
working with the speech team     off o2     ROS all others are neg     doing well today   no fc no sob no chest pain   no le edema     Vital Signs Last 24 Hrs  T(C): 37.1 (06 May 2020 11:35), Max: 37.1 (06 May 2020 11:35)  T(F): 98.7 (06 May 2020 11:35), Max: 98.7 (06 May 2020 11:35)  HR: 70 (06 May 2020 11:35) (70 - 82)  BP: 112/84 (06 May 2020 11:35) (112/84 - 120/58)  BP(mean): --  RR: 14 (06 May 2020 11:35) (14 - 14)  SpO2: 97% (06 May 2020 11:35) (95% - 97%)    MEDICATIONS  (STANDING):    amLODIPine   Tablet 5 milliGRAM(s) Oral daily  ammonium lactate 12% Lotion 1 Application(s) Topical two times a day  aspirin enteric coated 81 milliGRAM(s) Oral daily  atorvastatin 10 milliGRAM(s) Oral at bedtime  BACItracin   Ointment 1 Application(s) Topical daily  benzonatate 100 milliGRAM(s) Oral every 8 hours  bisacodyl Suppository 10 milliGRAM(s) Rectal once  dextrose 5%. 1000 milliLiter(s) (50 mL/Hr) IV Continuous <Continuous>  dextrose 50% Injectable 12.5 Gram(s) IV Push once  dextrose 50% Injectable 25 Gram(s) IV Push once  dextrose 50% Injectable 25 Gram(s) IV Push once  enoxaparin Injectable 70 milliGRAM(s) SubCutaneous every 12 hours  famotidine    Tablet 20 milliGRAM(s) Oral daily  FLUoxetine 10 milliGRAM(s) Oral daily  gabapentin 300 milliGRAM(s) Oral at bedtime  hydrALAZINE 50 milliGRAM(s) Oral daily  insulin glargine Injectable (LANTUS) 30 Unit(s) SubCutaneous at bedtime  insulin lispro (HumaLOG) corrective regimen sliding scale   SubCutaneous three times a day before meals  insulin lispro (HumaLOG) corrective regimen sliding scale   SubCutaneous at bedtime  insulin lispro Injectable (HumaLOG) 3 Unit(s) SubCutaneous three times a day before meals  lactulose Syrup 10 Gram(s) Oral once  lidocaine   Patch 1 Patch Transdermal daily  melatonin 3 milliGRAM(s) Oral at bedtime  metFORMIN 500 milliGRAM(s) Oral two times a day with meals  metoprolol tartrate 25 milliGRAM(s) Oral every 12 hours  polyethylene glycol 3350 17 Gram(s) Oral daily  sodium chloride 0.65% Nasal 1 Spray(s) Both Nostrils four times a day

## 2020-05-06 NOTE — PROGRESS NOTE ADULT - SUBJECTIVE AND OBJECTIVE BOX
Patient is a 69y old  Female who presents with a chief complaint of left frontal parietal CVA with right HP and aphasia, COVID+ 4/8/20 fu (05 May 2020 11:26)      HPI:  SHIREEN CASH is a 68yo female RH dominant with PMH of HTN, HLD, T2DM, hx CVA, GERD, known RBBB presented to [facility] ED on 4/8/20 with complaints of fever, chills, cough, chest pain, SOB for 5 days. Per patient, her  tested +COVID at an outpatient testing facility a few days prior, patient herself had tested negative at the time. They continued to share the same living space. ED workup showed +COVID and Mobitz type II block. EP was consulted and patient is s/p Medtronic Micra AV+ leadless pacemaker implantation via right femoral vein on 4/10/20. Hydroxychloroquine presumably not given due to prolonged QTc.     On 4/16/20, patient developed acute aphasia and right sided weakness. CT head showed an acute LEFT frontal/parietal CVA. CTA head/neck showed LEFT ICA stenosis but no occlusion. tPA was not administered due to timing and no penumbra. Placed on full dose lovenox as thoguht to be hypercoagulable secondary to COVID. Patient was cleared for regular consistency solids, thin liquids. Transferred to [facility] for acute inpatient rehab services 4/23/20. (23 Apr 2020 14:26)      PAST MEDICAL & SURGICAL HISTORY:  H/O gastroesophageal reflux (GERD)  HTN (hypertension)  DM (diabetes mellitus)  History of benign brain tumor      MEDICATIONS  (STANDING):  amLODIPine   Tablet 5 milliGRAM(s) Oral daily  ammonium lactate 12% Lotion 1 Application(s) Topical two times a day  aspirin enteric coated 81 milliGRAM(s) Oral daily  atorvastatin 10 milliGRAM(s) Oral at bedtime  BACItracin   Ointment 1 Application(s) Topical daily  benzonatate 100 milliGRAM(s) Oral every 8 hours  bisacodyl Suppository 10 milliGRAM(s) Rectal once  dextrose 5%. 1000 milliLiter(s) (50 mL/Hr) IV Continuous <Continuous>  dextrose 50% Injectable 12.5 Gram(s) IV Push once  dextrose 50% Injectable 25 Gram(s) IV Push once  dextrose 50% Injectable 25 Gram(s) IV Push once  enoxaparin Injectable 70 milliGRAM(s) SubCutaneous every 12 hours  famotidine    Tablet 20 milliGRAM(s) Oral daily  FLUoxetine 10 milliGRAM(s) Oral daily  gabapentin 300 milliGRAM(s) Oral at bedtime  hydrALAZINE 50 milliGRAM(s) Oral daily  insulin glargine Injectable (LANTUS) 30 Unit(s) SubCutaneous at bedtime  insulin lispro (HumaLOG) corrective regimen sliding scale   SubCutaneous three times a day before meals  insulin lispro (HumaLOG) corrective regimen sliding scale   SubCutaneous at bedtime  insulin lispro Injectable (HumaLOG) 3 Unit(s) SubCutaneous three times a day before meals  lactulose Syrup 10 Gram(s) Oral once  lidocaine   Patch 1 Patch Transdermal daily  melatonin 3 milliGRAM(s) Oral at bedtime  metFORMIN 500 milliGRAM(s) Oral two times a day with meals  metoprolol tartrate 25 milliGRAM(s) Oral every 12 hours  polyethylene glycol 3350 17 Gram(s) Oral daily  sodium chloride 0.65% Nasal 1 Spray(s) Both Nostrils four times a day    MEDICATIONS  (PRN):  acetaminophen   Tablet .. 650 milliGRAM(s) Oral every 6 hours PRN Temp greater or equal to 38C (100.4F), Mild Pain (1 - 3)  dextrose 40% Gel 15 Gram(s) Oral once PRN Blood Glucose LESS THAN 70 milliGRAM(s)/deciliter  glucagon  Injectable 1 milliGRAM(s) IntraMuscular once PRN Glucose LESS THAN 70 milligrams/deciliter  ibuprofen  Tablet. 400 milliGRAM(s) Oral every 12 hours PRN Moderate Pain (4 - 6)  lactulose Syrup 10 Gram(s) Oral daily PRN constipation      Allergies    No Known Allergies    Intolerances          VITALS  69y  Vital Signs Last 24 Hrs  T(C): 36.9 (05 May 2020 22:29), Max: 36.9 (05 May 2020 22:29)  T(F): 98.4 (05 May 2020 22:29), Max: 98.4 (05 May 2020 22:29)  HR: 75 (06 May 2020 06:27) (75 - 82)  BP: 113/60 (06 May 2020 06:27) (113/60 - 120/58)  BP(mean): --  RR: 14 (05 May 2020 22:29) (14 - 14)  SpO2: 95% (05 May 2020 22:29) (95% - 95%)  Daily     Daily         RECENT LABS:                      CAPILLARY BLOOD GLUCOSE      POCT Blood Glucose.: 159 mg/dL (06 May 2020 07:51)  POCT Blood Glucose.: 126 mg/dL (05 May 2020 22:32)  POCT Blood Glucose.: 116 mg/dL (05 May 2020 17:02)

## 2020-05-06 NOTE — PROGRESS NOTE ADULT - ASSESSMENT
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  - fall and aspiration precautions  - cont prozac for motor recovery  -right SAFO evalution with liner for support given foot drop and inversion.   -PRECAUTIONS: airborne, contact, aspiration, cardiac, fall    #right knee pain: anterior and suprapatellar effusion. RESOLVED  -currently on therapeutic lovenox  -right knee effusion, likely due to instability and quad/ham weakness. Cold compress, ACE wrap PRN  -add lidoderm patch for pain daily   -ibuprofen PRN  Doppler negative DVT   -Xray right knee  negative for acute fracture      #Acute Hypoxic Respiratory Failure secondary to COVID-19 PNA  Date of COVID testin20  - Hydroxychloroquine not given, pt with prolonged QTc  - EKG - QTc: 500 (), incomplete RBBB  - Incentive spirometry, robitussin DM, ocean spray  -patient tolerating therapies without o2  -will need re-swab next week prior to dc    #labs:  leukocytosis 12.3--> resolved 8.65   CBC -->10.29 --> ( ) 10.0    #HTN  - Lopressor 25 q12, amlodipine 5 qd, hydralazine 50mg qd  - BP controlled     #type II Mobitz block, known RBBB  - s/p Medtronic Micra AV+ leadless pacemaker  - outpt EP followup    #T2DM.  - SSI ACHS  -diabetic educator consult appreciated :  ·	Start Metformin 500 mg w/ breakfast and dinner  ·	will decrease insulin doses to avoid potential hypoglycemia.  Will adjust up or down as needed in response to metformin  ·	Decrease Glargine from 37 to 30 units at bedtime  ·	Decrease Lispro from 5 to 3  units three times a day before meals w/ medium correction.  -FS much better controlled     #HLD  - cont simvastatin 20 qhs    #mood/sleep  - Prozac 10  - melatonin 3    #Pain Mgmt   - Tylenol PRN    #GI/Bowel Mgmt   - Continent  - pepcid    #/Bladder Mgmt   - Continent, PVR    #FEN   - Diet - Regular + Thins  [[facility]]  DASH/TLC  -BUn/Cr 25/1.03-->26/0.96   encourage po fluids  BMp     #skin:  abrasion back. bacitracin and DSD daily     #- DVT PPX  - Lovenox 70mg Q12h    #Case discussed in IDT rounds :  Patient requires set up UE dressing and set up for eating and grooming, min assist LE dressing in bed. Max assist toileting and mod-max assist shower transfers. Improvement in yes/no reliability and expression  -SAFO with liner right LE  -goas: will need diabetic education, min -mod assist transfers, mod assist for communication and all iADLs  -caregiver training  -target dc: change date to 5/15/20 with home Pt, OT, SLP and caregiver support    #LABS  CBC, BMP   right sAFO

## 2020-05-06 NOTE — PROGRESS NOTE ADULT - COMMENTS
Patient seen with language line  in Croatian, OT also at bedside 8:30 AM. Patient reports no pain in posterior calf or knee, however has pain in right ankle. continues to have expressive language deficits but questions whether she can walk and frustration with lack movement in ankle

## 2020-05-06 NOTE — PROGRESS NOTE ADULT - EXTREMITIES COMMENTS
ecchymoses : slightly increased area distribution but signs of resolution, yellowing. mild TTP, no induration or fluctuance  no swelling ankle but TTP along TFL +tight heel cord and inversion tendency  no left calf TTP

## 2020-05-06 NOTE — PROGRESS NOTE ADULT - ASSESSMENT
70 yo F essential HTN, HLD, T2DM, hx CVA, GERD, known RBBB with COVID-19 debility and Left frontal and parietal CVA with residual right HP, foot drop, patricio inattention aphasia  - cont ASA, lovenox therapeutic dose as may be hypercoagulable due to COVID. (neg pfo)   clinical improvement noted/time   will need to review with neurology the duration of anticoagulation for COVID-19 related hypercoagulable state     #right calf pain resolved  doppler RLE negative,   -currently on therapeutic lovenox  pain control     #Acute Hypoxic Respiratory Failure secondary to COVID-19 PNA  Date of COVID testin20 resolving   keep 02 sat approx 94%   monitor rr pattern    #Essential HTN controlled    #type II Mobitz block, known RBBB  - s/p Medtronic Micra AV+ leadless pacemaker  - outpt EP followup    #T2DM controlled now   continue the current lantus dosing   - iss, accuchecks    #HLD  - cont simvastatin 20 qhs    #- DVT PPX  : Lovenox 70mg Q12h    Reviewed w patient plan of care   Reviewed w rehab team plan of care   full code

## 2020-05-07 LAB
ALBUMIN SERPL ELPH-MCNC: 3.6 G/DL — SIGNIFICANT CHANGE UP (ref 3.3–5)
ALP SERPL-CCNC: 59 U/L — SIGNIFICANT CHANGE UP (ref 40–120)
ALT FLD-CCNC: 25 U/L — SIGNIFICANT CHANGE UP (ref 10–45)
ANION GAP SERPL CALC-SCNC: 10 MMOL/L — SIGNIFICANT CHANGE UP (ref 5–17)
APPEARANCE UR: CLEAR — SIGNIFICANT CHANGE UP
AST SERPL-CCNC: 21 U/L — SIGNIFICANT CHANGE UP (ref 10–40)
BASOPHILS # BLD AUTO: 0.07 K/UL — SIGNIFICANT CHANGE UP (ref 0–0.2)
BASOPHILS NFR BLD AUTO: 0.6 % — SIGNIFICANT CHANGE UP (ref 0–2)
BILIRUB SERPL-MCNC: 0.5 MG/DL — SIGNIFICANT CHANGE UP (ref 0.2–1.2)
BILIRUB UR-MCNC: NEGATIVE — SIGNIFICANT CHANGE UP
BUN SERPL-MCNC: 22 MG/DL — SIGNIFICANT CHANGE UP (ref 7–23)
CALCIUM SERPL-MCNC: 9.4 MG/DL — SIGNIFICANT CHANGE UP (ref 8.4–10.5)
CHLORIDE SERPL-SCNC: 104 MMOL/L — SIGNIFICANT CHANGE UP (ref 96–108)
CO2 SERPL-SCNC: 26 MMOL/L — SIGNIFICANT CHANGE UP (ref 22–31)
COLOR SPEC: YELLOW — SIGNIFICANT CHANGE UP
CREAT SERPL-MCNC: 1.16 MG/DL — SIGNIFICANT CHANGE UP (ref 0.5–1.3)
DIFF PNL FLD: NEGATIVE — SIGNIFICANT CHANGE UP
EOSINOPHIL # BLD AUTO: 0.64 K/UL — HIGH (ref 0–0.5)
EOSINOPHIL NFR BLD AUTO: 5.3 % — SIGNIFICANT CHANGE UP (ref 0–6)
GLUCOSE SERPL-MCNC: 113 MG/DL — HIGH (ref 70–99)
GLUCOSE UR QL: NEGATIVE — SIGNIFICANT CHANGE UP
HCT VFR BLD CALC: 31 % — LOW (ref 34.5–45)
HGB BLD-MCNC: 10 G/DL — LOW (ref 11.5–15.5)
IMM GRANULOCYTES NFR BLD AUTO: 1.8 % — HIGH (ref 0–1.5)
KETONES UR-MCNC: NEGATIVE — SIGNIFICANT CHANGE UP
LEUKOCYTE ESTERASE UR-ACNC: ABNORMAL
LYMPHOCYTES # BLD AUTO: 2.78 K/UL — SIGNIFICANT CHANGE UP (ref 1–3.3)
LYMPHOCYTES # BLD AUTO: 22.8 % — SIGNIFICANT CHANGE UP (ref 13–44)
MCHC RBC-ENTMCNC: 28.7 PG — SIGNIFICANT CHANGE UP (ref 27–34)
MCHC RBC-ENTMCNC: 32.3 GM/DL — SIGNIFICANT CHANGE UP (ref 32–36)
MCV RBC AUTO: 88.8 FL — SIGNIFICANT CHANGE UP (ref 80–100)
MONOCYTES # BLD AUTO: 1.16 K/UL — HIGH (ref 0–0.9)
MONOCYTES NFR BLD AUTO: 9.5 % — SIGNIFICANT CHANGE UP (ref 2–14)
NEUTROPHILS # BLD AUTO: 7.3 K/UL — SIGNIFICANT CHANGE UP (ref 1.8–7.4)
NEUTROPHILS NFR BLD AUTO: 60 % — SIGNIFICANT CHANGE UP (ref 43–77)
NITRITE UR-MCNC: POSITIVE
NRBC # BLD: 0 /100 WBCS — SIGNIFICANT CHANGE UP (ref 0–0)
PH UR: 7 — SIGNIFICANT CHANGE UP (ref 5–8)
PLATELET # BLD AUTO: 399 K/UL — SIGNIFICANT CHANGE UP (ref 150–400)
POTASSIUM SERPL-MCNC: 4.7 MMOL/L — SIGNIFICANT CHANGE UP (ref 3.5–5.3)
POTASSIUM SERPL-SCNC: 4.7 MMOL/L — SIGNIFICANT CHANGE UP (ref 3.5–5.3)
PROT SERPL-MCNC: 7.2 G/DL — SIGNIFICANT CHANGE UP (ref 6–8.3)
PROT UR-MCNC: NEGATIVE — SIGNIFICANT CHANGE UP
RBC # BLD: 3.49 M/UL — LOW (ref 3.8–5.2)
RBC # FLD: 16.4 % — HIGH (ref 10.3–14.5)
SARS-COV-2 RNA SPEC QL NAA+PROBE: SIGNIFICANT CHANGE UP
SODIUM SERPL-SCNC: 140 MMOL/L — SIGNIFICANT CHANGE UP (ref 135–145)
SP GR SPEC: 1.01 — SIGNIFICANT CHANGE UP (ref 1.01–1.02)
UROBILINOGEN FLD QL: NEGATIVE — SIGNIFICANT CHANGE UP
WBC # BLD: 12.17 K/UL — HIGH (ref 3.8–10.5)
WBC # FLD AUTO: 12.17 K/UL — HIGH (ref 3.8–10.5)

## 2020-05-07 PROCEDURE — 99232 SBSQ HOSP IP/OBS MODERATE 35: CPT

## 2020-05-07 PROCEDURE — 93970 EXTREMITY STUDY: CPT | Mod: 26

## 2020-05-07 PROCEDURE — 99233 SBSQ HOSP IP/OBS HIGH 50: CPT

## 2020-05-07 RX ORDER — SODIUM CHLORIDE 9 MG/ML
1000 INJECTION INTRAMUSCULAR; INTRAVENOUS; SUBCUTANEOUS ONCE
Refills: 0 | Status: DISCONTINUED | OUTPATIENT
Start: 2020-05-07 | End: 2020-05-22

## 2020-05-07 RX ORDER — INSULIN GLARGINE 100 [IU]/ML
20 INJECTION, SOLUTION SUBCUTANEOUS AT BEDTIME
Refills: 0 | Status: DISCONTINUED | OUTPATIENT
Start: 2020-05-07 | End: 2020-05-29

## 2020-05-07 RX ORDER — METFORMIN HYDROCHLORIDE 850 MG/1
1000 TABLET ORAL
Refills: 0 | Status: DISCONTINUED | OUTPATIENT
Start: 2020-05-07 | End: 2020-05-29

## 2020-05-07 RX ADMIN — Medication 3 MILLIGRAM(S): at 21:18

## 2020-05-07 RX ADMIN — ENOXAPARIN SODIUM 70 MILLIGRAM(S): 100 INJECTION SUBCUTANEOUS at 17:05

## 2020-05-07 RX ADMIN — Medication 50 MILLIGRAM(S): at 05:08

## 2020-05-07 RX ADMIN — Medication 1 APPLICATION(S): at 11:40

## 2020-05-07 RX ADMIN — Medication 1 APPLICATION(S): at 05:06

## 2020-05-07 RX ADMIN — METFORMIN HYDROCHLORIDE 1000 MILLIGRAM(S): 850 TABLET ORAL at 17:05

## 2020-05-07 RX ADMIN — Medication 81 MILLIGRAM(S): at 11:40

## 2020-05-07 RX ADMIN — Medication 1 SPRAY(S): at 17:05

## 2020-05-07 RX ADMIN — Medication 400 MILLIGRAM(S): at 12:43

## 2020-05-07 RX ADMIN — INSULIN GLARGINE 20 UNIT(S): 100 INJECTION, SOLUTION SUBCUTANEOUS at 21:18

## 2020-05-07 RX ADMIN — Medication 10 MILLIGRAM(S): at 11:41

## 2020-05-07 RX ADMIN — Medication 100 MILLIGRAM(S): at 14:21

## 2020-05-07 RX ADMIN — Medication 3 UNIT(S): at 08:17

## 2020-05-07 RX ADMIN — Medication 1 SPRAY(S): at 12:43

## 2020-05-07 RX ADMIN — METFORMIN HYDROCHLORIDE 500 MILLIGRAM(S): 850 TABLET ORAL at 08:17

## 2020-05-07 RX ADMIN — Medication 25 MILLIGRAM(S): at 05:08

## 2020-05-07 RX ADMIN — FAMOTIDINE 20 MILLIGRAM(S): 10 INJECTION INTRAVENOUS at 11:41

## 2020-05-07 RX ADMIN — AMLODIPINE BESYLATE 5 MILLIGRAM(S): 2.5 TABLET ORAL at 05:06

## 2020-05-07 RX ADMIN — Medication 650 MILLIGRAM(S): at 14:27

## 2020-05-07 RX ADMIN — Medication 3 UNIT(S): at 11:34

## 2020-05-07 RX ADMIN — Medication 1 APPLICATION(S): at 17:05

## 2020-05-07 RX ADMIN — LIDOCAINE 1 PATCH: 4 CREAM TOPICAL at 04:28

## 2020-05-07 RX ADMIN — Medication 25 MILLIGRAM(S): at 17:05

## 2020-05-07 RX ADMIN — Medication 1 SPRAY(S): at 05:08

## 2020-05-07 RX ADMIN — Medication 100 MILLIGRAM(S): at 05:07

## 2020-05-07 RX ADMIN — Medication 100 MILLIGRAM(S): at 21:17

## 2020-05-07 RX ADMIN — ENOXAPARIN SODIUM 70 MILLIGRAM(S): 100 INJECTION SUBCUTANEOUS at 05:07

## 2020-05-07 RX ADMIN — GABAPENTIN 300 MILLIGRAM(S): 400 CAPSULE ORAL at 21:17

## 2020-05-07 RX ADMIN — ATORVASTATIN CALCIUM 10 MILLIGRAM(S): 80 TABLET, FILM COATED ORAL at 21:17

## 2020-05-07 NOTE — PROGRESS NOTE ADULT - SUBJECTIVE AND OBJECTIVE BOX
Diabetes Note:  Uncontrolled Type 2 diabetes    Diabetes Type:  2     Current Endocrine Meds:   atorvastatin 10 milliGRAM(s) Oral at bedtime  dextrose 40% Gel 15 Gram(s) Oral once PRN  dextrose 50% Injectable 12.5 Gram(s) IV Push once  dextrose 50% Injectable 25 Gram(s) IV Push once  dextrose 50% Injectable 25 Gram(s) IV Push once  glucagon  Injectable 1 milliGRAM(s) IntraMuscular once PRN  insulin glargine Injectable (LANTUS) 20 Unit(s) SubCutaneous at bedtime  insulin lispro (HumaLOG) corrective regimen sliding scale   SubCutaneous three times a day before meals  insulin lispro (HumaLOG) corrective regimen sliding scale   SubCutaneous at bedtime  metFORMIN 1000 milliGRAM(s) Oral two times a day with meals      Vital Signs Last 24 Hrs  T(C): 36.9 (07 May 2020 09:14), Max: 37.1 (06 May 2020 21:13)  T(F): 98.5 (07 May 2020 09:14), Max: 98.8 (06 May 2020 21:13)  HR: 68 (07 May 2020 09:14) (68 - 77)  BP: 115/70 (07 May 2020 09:14) (115/66 - 146/69)  BP(mean): --  RR: 14 (07 May 2020 09:14) (14 - 16)  SpO2: 98% (07 May 2020 09:14) (94% - 98%)        Hypoglycemic Episodes:  none    Pt unlikely be able to safely manage diabetes independently at home.  Son very willing to assist.  To minimize burden and improve adherence, Pre-meal insulin discontinued.  Son will need video instruction prior to discharge.  Please contact me when d/c date known. Preethi Dick cell [phone]    Diabetes Plan  For now:  Decrease Glargine from 30 to 20 units at bedtime   Stop pre-meal Lispro and continue moderate dose correction  Increase Metformin from 500 to 1,000 mg BID.  500 mg tabs ordered for easier swallowing    Will continue to monitor and adjust accordingly.  Discussed plan with Dr. Cross    At discharge, recommend:  Metformin and Lantus.  Dose TBD      Pt can follow up at discharge with: PCP

## 2020-05-07 NOTE — PROGRESS NOTE ADULT - EXTREMITIES COMMENTS
RIGHT leg: in knee immobilizer and ace wrapped at the knee. Warmth and tenderness at the right calk - painful to palpation at posterior calf.

## 2020-05-07 NOTE — PROGRESS NOTE ADULT - ASSESSMENT
68 yo F essential HTN, HLD, T2DM, hx CVA, GERD, known RBBB with COVID-19 debility and Left frontal and parietal CVA with residual right HP, foot drop, patricio inattention aphasia  - cont ASA, lovenox therapeutic dose as may be hypercoagulable due to COVID. (neg pfo)   clinical improvement noted/time   will need to review with neurology the duration of anticoagulation for COVID-19 related hypercoagulable state ...    #right calf pain resolved  doppler RLE negative,   -currently on therapeutic lovenox  pain control     #Acute Hypoxic Respiratory Failure secondary to COVID-19 PNA  Date of COVID testin20 resolved  keep 02 sat approx 94%   monitor rr pattern    #Essential HTN controlled    #type II Mobitz block, known RBBB  - s/p Medtronic Micra AV+ leadless pacemaker  - outpt EP followup    #T2DM controlled now   continue the current lantus dosing   - iss, accuchecks    #HLD  - cont simvastatin 20 qhs    #- DVT PPX  : Lovenox 70mg Q12h    Reviewed w patient plan of care   Reviewed w rehab team plan of care   full code

## 2020-05-07 NOTE — PROGRESS NOTE ADULT - SUBJECTIVE AND OBJECTIVE BOX
seen and examined     no co     no chest pain   no sob     ROS all others are neg   no fc                           10.0   12.17 )-----------( 399      ( 07 May 2020 06:30 )             31.0   05-07    140  |  104  |  22  ----------------------------<  113<H>  4.7   |  26  |  1.16    Ca    9.4      07 May 2020 06:30    TPro  7.2  /  Alb  3.6  /  TBili  0.5  /  DBili  x   /  AST  21  /  ALT  25  /  AlkPhos  59  05-07    MEDICATIONS  (STANDING):    amLODIPine   Tablet 5 milliGRAM(s) Oral daily  ammonium lactate 12% Lotion 1 Application(s) Topical two times a day  aspirin enteric coated 81 milliGRAM(s) Oral daily  atorvastatin 10 milliGRAM(s) Oral at bedtime  BACItracin   Ointment 1 Application(s) Topical daily  benzonatate 100 milliGRAM(s) Oral every 8 hours  dextrose 5%. 1000 milliLiter(s) (50 mL/Hr) IV Continuous <Continuous>  dextrose 50% Injectable 12.5 Gram(s) IV Push once  dextrose 50% Injectable 25 Gram(s) IV Push once  dextrose 50% Injectable 25 Gram(s) IV Push once  enoxaparin Injectable 70 milliGRAM(s) SubCutaneous every 12 hours  famotidine    Tablet 20 milliGRAM(s) Oral daily  FLUoxetine 10 milliGRAM(s) Oral daily  gabapentin 300 milliGRAM(s) Oral at bedtime  hydrALAZINE 50 milliGRAM(s) Oral daily  insulin glargine Injectable (LANTUS) 30 Unit(s) SubCutaneous at bedtime  insulin lispro (HumaLOG) corrective regimen sliding scale   SubCutaneous three times a day before meals  insulin lispro (HumaLOG) corrective regimen sliding scale   SubCutaneous at bedtime  insulin lispro Injectable (HumaLOG) 3 Unit(s) SubCutaneous three times a day before meals  lidocaine   Patch 1 Patch Transdermal daily  melatonin 3 milliGRAM(s) Oral at bedtime  metFORMIN 500 milliGRAM(s) Oral two times a day with meals  metoprolol tartrate 25 milliGRAM(s) Oral every 12 hours  polyethylene glycol 3350 17 Gram(s) Oral daily  sodium chloride 0.65% Nasal 1 Spray(s) Both Nostrils four times a day  sodium chloride 0.9% Bolus 1000 milliLiter(s) IV Bolus once

## 2020-05-07 NOTE — PROGRESS NOTE ADULT - SUBJECTIVE AND OBJECTIVE BOX
Patient is a 69y old  Female who presents with a chief complaint of left frontal parietal CVA with right HP and aphasia, COVID+ 4/8/20 fu (06 May 2020 13:03)      HPI:  SHIREEN CASH is a 68yo female RH dominant with PMH of HTN, HLD, T2DM, hx CVA, GERD, known RBBB presented to [facility] ED on 4/8/20 with complaints of fever, chills, cough, chest pain, SOB for 5 days. Per patient, her  tested +COVID at an outpatient testing facility a few days prior, patient herself had tested negative at the time. They continued to share the same living space. ED workup showed +COVID and Mobitz type II block. EP was consulted and patient is s/p Medtronic Micra AV+ leadless pacemaker implantation via right femoral vein on 4/10/20. Hydroxychloroquine presumably not given due to prolonged QTc.     On 4/16/20, patient developed acute aphasia and right sided weakness. CT head showed an acute LEFT frontal/parietal CVA. CTA head/neck showed LEFT ICA stenosis but no occlusion. tPA was not administered due to timing and no penumbra. Placed on full dose lovenox as thoguht to be hypercoagulable secondary to COVID. Patient was cleared for regular consistency solids, thin liquids. Transferred to [facility] for acute inpatient rehab services 4/23/20. (23 Apr 2020 14:26)      PAST MEDICAL & SURGICAL HISTORY:  H/O gastroesophageal reflux (GERD)  HTN (hypertension)  DM (diabetes mellitus)  History of benign brain tumor      MEDICATIONS  (STANDING):  amLODIPine   Tablet 5 milliGRAM(s) Oral daily  ammonium lactate 12% Lotion 1 Application(s) Topical two times a day  aspirin enteric coated 81 milliGRAM(s) Oral daily  atorvastatin 10 milliGRAM(s) Oral at bedtime  BACItracin   Ointment 1 Application(s) Topical daily  benzonatate 100 milliGRAM(s) Oral every 8 hours  dextrose 5%. 1000 milliLiter(s) (50 mL/Hr) IV Continuous <Continuous>  dextrose 50% Injectable 12.5 Gram(s) IV Push once  dextrose 50% Injectable 25 Gram(s) IV Push once  dextrose 50% Injectable 25 Gram(s) IV Push once  enoxaparin Injectable 70 milliGRAM(s) SubCutaneous every 12 hours  famotidine    Tablet 20 milliGRAM(s) Oral daily  FLUoxetine 10 milliGRAM(s) Oral daily  gabapentin 300 milliGRAM(s) Oral at bedtime  hydrALAZINE 50 milliGRAM(s) Oral daily  insulin glargine Injectable (LANTUS) 30 Unit(s) SubCutaneous at bedtime  insulin lispro (HumaLOG) corrective regimen sliding scale   SubCutaneous three times a day before meals  insulin lispro (HumaLOG) corrective regimen sliding scale   SubCutaneous at bedtime  insulin lispro Injectable (HumaLOG) 3 Unit(s) SubCutaneous three times a day before meals  lidocaine   Patch 1 Patch Transdermal daily  melatonin 3 milliGRAM(s) Oral at bedtime  metFORMIN 500 milliGRAM(s) Oral two times a day with meals  metoprolol tartrate 25 milliGRAM(s) Oral every 12 hours  polyethylene glycol 3350 17 Gram(s) Oral daily  sodium chloride 0.65% Nasal 1 Spray(s) Both Nostrils four times a day    MEDICATIONS  (PRN):  acetaminophen   Tablet .. 650 milliGRAM(s) Oral every 6 hours PRN Temp greater or equal to 38C (100.4F), Mild Pain (1 - 3)  dextrose 40% Gel 15 Gram(s) Oral once PRN Blood Glucose LESS THAN 70 milliGRAM(s)/deciliter  glucagon  Injectable 1 milliGRAM(s) IntraMuscular once PRN Glucose LESS THAN 70 milligrams/deciliter  ibuprofen  Tablet. 400 milliGRAM(s) Oral every 12 hours PRN Moderate Pain (4 - 6)  lactulose Syrup 10 Gram(s) Oral daily PRN constipation      Allergies    No Known Allergies    Intolerances            PHYSICAL EXAM  69y  Vital Signs Last 24 Hrs  T(C): 36.9 (07 May 2020 09:14), Max: 37.1 (06 May 2020 11:35)  T(F): 98.5 (07 May 2020 09:14), Max: 98.8 (06 May 2020 21:13)  HR: 68 (07 May 2020 09:14) (68 - 77)  BP: 115/70 (07 May 2020 09:14) (112/84 - 146/69)  BP(mean): --  RR: 14 (07 May 2020 09:14) (14 - 16)  SpO2: 98% (07 May 2020 09:14) (94% - 98%)  Daily     Daily       RECENT LABS:                          10.0   12.17 )-----------( 399      ( 07 May 2020 06:30 )             31.0     05-07    140  |  104  |  22  ----------------------------<  113<H>  4.7   |  26  |  1.16    Ca    9.4      07 May 2020 06:30    TPro  7.2  /  Alb  3.6  /  TBili  0.5  /  DBili  x   /  AST  21  /  ALT  25  /  AlkPhos  59  05-07    LIVER FUNCTIONS - ( 07 May 2020 06:30 )  Alb: 3.6 g/dL / Pro: 7.2 g/dL / ALK PHOS: 59 U/L / ALT: 25 U/L / AST: 21 U/L / GGT: x                   CAPILLARY BLOOD GLUCOSE      POCT Blood Glucose.: 114 mg/dL (07 May 2020 07:39)  POCT Blood Glucose.: 136 mg/dL (06 May 2020 21:48)  POCT Blood Glucose.: 139 mg/dL (06 May 2020 17:26)  POCT Blood Glucose.: 104 mg/dL (06 May 2020 12:33) Patient is a 69y old  Female who presents with a chief complaint of left frontal parietal CVA with right HP and aphasia, COVID+ 4/8/20 fu (06 May 2020 13:03)      HPI:  SHIREEN CASH is a 70yo female RH dominant with PMH of HTN, HLD, T2DM, hx CVA, GERD, known RBBB presented to [facility] ED on 4/8/20 with complaints of fever, chills, cough, chest pain, SOB for 5 days. Per patient, her  tested +COVID at an outpatient testing facility a few days prior, patient herself had tested negative at the time. They continued to share the same living space. ED workup showed +COVID and Mobitz type II block. EP was consulted and patient is s/p Medtronic Micra AV+ leadless pacemaker implantation via right femoral vein on 4/10/20. Hydroxychloroquine presumably not given due to prolonged QTc.     On 4/16/20, patient developed acute aphasia and right sided weakness. CT head showed an acute LEFT frontal/parietal CVA. CTA head/neck showed LEFT ICA stenosis but no occlusion. tPA was not administered due to timing and no penumbra. Placed on full dose lovenox as thoguht to be hypercoagulable secondary to COVID. Patient was cleared for regular consistency solids, thin liquids. Transferred to [facility] for acute inpatient rehab services 4/23/20. (23 Apr 2020 14:26)      PAST MEDICAL & SURGICAL HISTORY:  H/O gastroesophageal reflux (GERD)  HTN (hypertension)  DM (diabetes mellitus)  History of benign brain tumor      MEDICATIONS  (STANDING):  amLODIPine   Tablet 5 milliGRAM(s) Oral daily  ammonium lactate 12% Lotion 1 Application(s) Topical two times a day  aspirin enteric coated 81 milliGRAM(s) Oral daily  atorvastatin 10 milliGRAM(s) Oral at bedtime  BACItracin   Ointment 1 Application(s) Topical daily  benzonatate 100 milliGRAM(s) Oral every 8 hours  dextrose 5%. 1000 milliLiter(s) (50 mL/Hr) IV Continuous <Continuous>  dextrose 50% Injectable 12.5 Gram(s) IV Push once  dextrose 50% Injectable 25 Gram(s) IV Push once  dextrose 50% Injectable 25 Gram(s) IV Push once  enoxaparin Injectable 70 milliGRAM(s) SubCutaneous every 12 hours  famotidine    Tablet 20 milliGRAM(s) Oral daily  FLUoxetine 10 milliGRAM(s) Oral daily  gabapentin 300 milliGRAM(s) Oral at bedtime  hydrALAZINE 50 milliGRAM(s) Oral daily  insulin glargine Injectable (LANTUS) 30 Unit(s) SubCutaneous at bedtime  insulin lispro (HumaLOG) corrective regimen sliding scale   SubCutaneous three times a day before meals  insulin lispro (HumaLOG) corrective regimen sliding scale   SubCutaneous at bedtime  insulin lispro Injectable (HumaLOG) 3 Unit(s) SubCutaneous three times a day before meals  lidocaine   Patch 1 Patch Transdermal daily  melatonin 3 milliGRAM(s) Oral at bedtime  metFORMIN 500 milliGRAM(s) Oral two times a day with meals  metoprolol tartrate 25 milliGRAM(s) Oral every 12 hours  polyethylene glycol 3350 17 Gram(s) Oral daily  sodium chloride 0.65% Nasal 1 Spray(s) Both Nostrils four times a day    MEDICATIONS  (PRN):  acetaminophen   Tablet .. 650 milliGRAM(s) Oral every 6 hours PRN Temp greater or equal to 38C (100.4F), Mild Pain (1 - 3)  dextrose 40% Gel 15 Gram(s) Oral once PRN Blood Glucose LESS THAN 70 milliGRAM(s)/deciliter  glucagon  Injectable 1 milliGRAM(s) IntraMuscular once PRN Glucose LESS THAN 70 milligrams/deciliter  ibuprofen  Tablet. 400 milliGRAM(s) Oral every 12 hours PRN Moderate Pain (4 - 6)  lactulose Syrup 10 Gram(s) Oral daily PRN constipation      Allergies    No Known Allergies    Intolerances            PHYSICAL EXAM  69y  Vital Signs Last 24 Hrs  T(C): 36.9 (07 May 2020 09:14), Max: 37.1 (06 May 2020 11:35)  T(F): 98.5 (07 May 2020 09:14), Max: 98.8 (06 May 2020 21:13)  HR: 68 (07 May 2020 09:14) (68 - 77)  BP: 115/70 (07 May 2020 09:14) (112/84 - 146/69)  BP(mean): --  RR: 14 (07 May 2020 09:14) (14 - 16)  SpO2: 98% (07 May 2020 09:14) (94% - 98%)  Daily     Daily       RECENT LABS:                          10.0   12.17 )-----------( 399      ( 07 May 2020 06:30 )             31.0     05-07    140  |  104  |  22  ----------------------------<  113<H>  4.7   |  26  |  1.16    Ca    9.4      07 May 2020 06:30    TPro  7.2  /  Alb  3.6  /  TBili  0.5  /  DBili  x   /  AST  21  /  ALT  25  /  AlkPhos  59  05-07    LIVER FUNCTIONS - ( 07 May 2020 06:30 )  Alb: 3.6 g/dL / Pro: 7.2 g/dL / ALK PHOS: 59 U/L / ALT: 25 U/L / AST: 21 U/L / GGT: x                 EXAM:  US DPLX LWR EXT VEINS COMPL BI      PROCEDURE DATE:  05/07/2020        INTERPRETATION:  CLINICAL INFORMATION: Right leg swelling    COMPARISON: Venous duplex Doppler examination of the right leg dated 4/28/2020.    TECHNIQUE: Duplex sonography of the BILATERAL LOWER extremity veins with color and spectral Doppler, with and without compression.      FINDINGS:    There is normal compressibility of the bilateral common femoral, femoral and popliteal veins.     Doppler examination shows normal spontaneous and phasic flow.    No calf vein thrombosis is detected.    Previous right leg venous duplex Doppler examination dated 4/28/2020 was negative.    IMPRESSION:     No evidence of deep venous thrombosis in either lower extremity.                  JACQUELINE WHATLEY M.D., ATTENDING RADIOLOGIST  This document has been electronically signed. May  7 2020  2:15PM                  CAPILLARY BLOOD GLUCOSE      POCT Blood Glucose.: 114 mg/dL (07 May 2020 07:39)  POCT Blood Glucose.: 136 mg/dL (06 May 2020 21:48)  POCT Blood Glucose.: 139 mg/dL (06 May 2020 17:26)  POCT Blood Glucose.: 104 mg/dL (06 May 2020 12:33)

## 2020-05-07 NOTE — PROGRESS NOTE ADULT - COMMENTS
Patient seen with language line  in Croatian, at bedside. Patient states that she was unable to sleep well last night du to pain on the right leg. Denies any fever, chest pain, cough, sob, abdominal pain or dysuria. Patient does feel constipated but does not recall last bowel mvoement. Patient seen with language line  in Hebrew, at bedside 10:45 AM. Patient states that she was unable to sleep well last night due to pain on the right leg. Denies any fever, chest pain, cough, sob, abdominal pain or dysuria. Patient does feel constipated but does not recall last bowel movement.

## 2020-05-07 NOTE — PROGRESS NOTE ADULT - RS GEN PE MLT RESP DETAILS PC
clear to auscultation bilaterally/respirations non-labored/normal/airway patent/breath sounds equal/good air movement

## 2020-05-07 NOTE — PROGRESS NOTE ADULT - ASSESSMENT
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  - fall and aspiration precautions  - cont prozac for motor recovery  -right SAFO evalution with liner for support given foot drop and inversion.   -PRECAUTIONS: airborne, contact, aspiration, cardiac, fall    #right knee pain: anterior and suprapatellar effusion. RESOLVED  -currently on therapeutic lovenox  -right knee effusion, likely due to instability and quad/ham weakness. Cold compress, ACE wrap PRN  -add lidoderm patch for pain daily   -ibuprofen PRN (last used 5/5 x 1)  Doppler negative DVT   -Xray right knee  negative for acute fracture  - with posterior right calf pain - LE dopplers (last on  negative)      #Acute Hypoxic Respiratory Failure secondary to COVID-19 PNA  Date of COVID testin20  - Hydroxychloroquine not given, pt with prolonged QTc  - EKG - QTc: 500 (), incomplete RBBB  - Incentive spirometry, robitussin DM, ocean spray  -patient tolerating therapies without o2  -will need re-swab next week prior to dc    #labs:   leukocytosis 12.7--> afebrile, check UA recheck     #HTN  - Lopressor 25 q12, amlodipine 5 qd, hydralazine 50mg qd  - BP controlled     #type II Mobitz block, known RBBB  - s/p Medtronic Micra AV+ leadless pacemaker  - outpt EP followup    #T2DM.  - SSI ACHS  -diabetic educator consult appreciated :  ·	Start Metformin 500 mg w/ breakfast and dinner  ·	will decrease insulin doses to avoid potential hypoglycemia.  Will adjust up or down as needed in response to metformin  ·	Decrease Glargine from 37 to 30 units at bedtime  ·	Decrease Lispro from 5 to 3  units three times a day before meals w/ medium correction.  -FS controlled   #HLD  - cont simvastatin 20 qhs    #mood/sleep  - Prozac 10  - melatonin 3    #Pain Mgmt   - Tylenol PRN    #GI/Bowel Mgmt   - Continent  - pepcid    #/Bladder Mgmt   - Continent, PVR    #FEN   - Diet - Regular + Thins  [CCHO]  DASH/TLC  -BUn/Cr 25/1.03-->26/0.96 ,  cre uptrending will give 1 liter ivf overnight , recheck in the AM  encourage po fluids    #skin:  abrasion back. bacitracin and DSD daily     #- DVT PPX  - Lovenox 70mg Q12h    #Case discussed in IDT rounds :  Patient requires set up UE dressing and set up for eating and grooming, min assist LE dressing in bed. Max assist toileting and mod-max assist shower transfers. Improvement in yes/no reliability and expression  -SAFO with liner right LE  -goas: will need diabetic education, min -mod assist transfers, mod assist for communication and all iADLs  -caregiver training  -target dc: change date to 5/15/20 with home Pt, OT, SLP and caregiver support    #LABS  CBC, BMP   UA  LE dopplers  right sAFO ASSESSMENT/PLAN  SHIREEN CASH is a 68yo Female with HTN, HLD, T2DM, hx CVA, GERD, known RBBB with COVID-19 debility and Left frontal and parietla CVA with residual right HP, foot drop, patricio inattention aphasia    #Left frontal/parietal CVA  - continue  Comprehensive Rehab Program of PT/OT/SLP  - cont ASA, lovenox therapeutic dose as may be hypercoagulable due to COVID.  - fall and aspiration precautions  - cont prozac for motor recovery  -right SAFO evalution with liner for support given foot drop and inversion.   -PRECAUTIONS: airborne, contact, aspiration, cardiac, fall    #right knee pain: anterior and suprapatellar effusion. RESOLVED  -currently on therapeutic lovenox  -right knee effusion, likely due to instability and quad/ham weakness. Cold compress, ACE wrap PRN  -add lidoderm patch for pain daily   -ibuprofen PRN (last used 5/5 x 1)  Doppler negative DVT   -Xray right knee  negative for acute fracture  - with posterior right calf pain - LE dopplers (last on  negative)      #Acute Hypoxic Respiratory Failure secondary to COVID-19 PNA  Date of COVID testin20  - Hydroxychloroquine not given, pt with prolonged QTc  - EKG - QTc: 500 (), incomplete RBBB  - Incentive spirometry, robitussin DM, ocean spray  -patient tolerating therapies without o2  -will need re-swab next week prior to dc    #labs:   leukocytosis 12.7--> afebrile, check UA recheck     #HTN  - Lopressor 25 q12, amlodipine 5 qd, hydralazine 50mg qd  - BP controlled     #type II Mobitz block, known RBBB  - s/p Medtronic Micra AV+ leadless pacemaker  - outpt EP followup    #T2DM.  - SSI ACHS  -diabetic educator consult appreciated :  ·	Start Metformin 500 mg w/ breakfast and dinner. Will increase dose 1000 mg BID , monitor for GI symptoms and attempt to wean/discontinue premeal for compliance and management at home  ·	will decrease insulin doses to avoid potential hypoglycemia.  Will adjust up or down as needed in response to metformin  ·	Decrease Glargine from 37 to 30 units -->20 units qhs   ·	DC lispro    #HLD  - cont simvastatin 20 qhs    #mood/sleep  - Prozac 10  - melatonin 3    #Pain Mgmt   - Tylenol PRN    #GI/Bowel Mgmt   - Continent  - pepcid    #/Bladder Mgmt   - Continent, PVR    #FEN   - Diet - Regular + Thins  [CCHO]  DASH/TLC  -BUn/Cr 25/1.03-->26/0.96 ,  cre uptrending will give 1 liter ivf overnight , recheck in the AM  encourage po fluids    #skin:  abrasion back. bacitracin and DSD daily     #- DVT PPX  - Lovenox 70mg Q12h    #Case discussed in IDT rounds :  Patient requires set up UE dressing and set up for eating and grooming, min assist LE dressing in bed. Max assist toileting and mod-max assist shower transfers. Improvement in yes/no reliability and expression  -SAFO with liner right LE  -goas: will need diabetic education, min -mod assist transfers, mod assist for communication and all iADLs  -caregiver training  -target dc: change date to 5/15/20 with home Pt, OT, SLP and caregiver support    #LABS  CBC, BMP   UA  LE dopplers  right sAFO ASSESSMENT/PLAN  SHIREEN CASH is a 68yo Female with HTN, HLD, T2DM, hx CVA, GERD, known RBBB with COVID-19 debility and Left frontal and parietla CVA with residual right HP, foot drop, patricio inattention aphasia    #Left frontal/parietal CVA  - continue  Comprehensive Rehab Program of PT/OT/SLP  - cont ASA, lovenox therapeutic dose as may be hypercoagulable due to COVID.  - fall and aspiration precautions  - cont prozac for motor recovery  -right SAFO evalution with liner for support given foot drop and inversion. Discussed with orthotist, will teach patient and family to ACE wrap foot for foot drop and refer for casting as outpatient  -PRECAUTIONS: airborne, contact, aspiration, cardiac, fall    #right knee pain: anterior and suprapatellar effusion. RESOLVED  -currently on therapeutic lovenox  -right knee effusion, likely due to instability and quad/ham weakness. Cold compress, ACE wrap PRN  -add lidoderm patch for pain daily   Doppler negative DVT   -Xray right knee  negative for acute fracture  - with posterior right calf pain - LE dopplers (last on  negative). Repeat  NEGATIVE      #Acute Hypoxic Respiratory Failure secondary to COVID-19 PNA  Date of COVID testin20  - Hydroxychloroquine not given, pt with prolonged QTc  - EKG - QTc: 500 (), incomplete RBBB  - Incentive spirometry, robitussin DM, ocean spray  -patient tolerating therapies without o2  -will need re-swab next week prior to dc    #labs:   leukocytosis 12.7--> afebrile, check UA recheck     #HTN  - Lopressor 25 q12, amlodipine 5 qd, hydralazine 50mg qd  - BP controlled     #type II Mobitz block, known RBBB  - s/p Medtronic Micra AV+ leadless pacemaker  - outpt EP followup    #T2DM.  - SSI ACHS  -diabetic educator consult appreciated :  ·	Start Metformin 500 mg w/ breakfast and dinner. Will increase dose 1000 mg BID , monitor for GI symptoms and attempt to wean/discontinue premeal for compliance and management at home  ·	will decrease insulin doses to avoid potential hypoglycemia.  Will adjust up or down as needed in response to metformin  ·	Decrease Glargine from 37 to 30 units -->20 units qhs   ·	DC lispro    #HLD  - cont simvastatin 20 qhs    #mood/sleep  - Prozac 10  - melatonin 3    #Pain Mgmt   - Tylenol PRN    #GI/Bowel Mgmt   - Continent  - pepcid    #/Bladder Mgmt   - Continent, PVR    #FEN   - Diet - Regular + Thins  [CCHO]  DASH/TLC  -BUn/Cr 25/1.03-->26/0.96 ,  cre uptrending will give 1 liter ivf overnight , recheck in the AM  encourage po fluids    #skin:  abrasion back. bacitracin and DSD daily     #- DVT PPX  - Lovenox 70mg Q12h    #Case discussed in IDT rounds :  Patient requires set up UE dressing and set up for eating and grooming, min assist LE dressing in bed. Max assist toileting and mod-max assist shower transfers. Improvement in yes/no reliability and expression  -SAFO with liner right LE  -goas: will need diabetic education, min -mod assist transfers, mod assist for communication and all iADLs  -caregiver training  -target dc: change date to 5/15/20 with home Pt, OT, SLP and caregiver support    #LABS  CBC, BMP   UA  LE dopplers  right sAFO

## 2020-05-08 LAB
ANION GAP SERPL CALC-SCNC: 11 MMOL/L — SIGNIFICANT CHANGE UP (ref 5–17)
BUN SERPL-MCNC: 24 MG/DL — HIGH (ref 7–23)
CALCIUM SERPL-MCNC: 9 MG/DL — SIGNIFICANT CHANGE UP (ref 8.4–10.5)
CHLORIDE SERPL-SCNC: 103 MMOL/L — SIGNIFICANT CHANGE UP (ref 96–108)
CO2 SERPL-SCNC: 23 MMOL/L — SIGNIFICANT CHANGE UP (ref 22–31)
CREAT SERPL-MCNC: 1.31 MG/DL — HIGH (ref 0.5–1.3)
GLUCOSE SERPL-MCNC: 81 MG/DL — SIGNIFICANT CHANGE UP (ref 70–99)
HCT VFR BLD CALC: 29.2 % — LOW (ref 34.5–45)
HCT VFR BLD CALC: 30 % — LOW (ref 34.5–45)
HGB BLD-MCNC: 9.6 G/DL — LOW (ref 11.5–15.5)
HGB BLD-MCNC: 9.8 G/DL — LOW (ref 11.5–15.5)
MCHC RBC-ENTMCNC: 28.9 PG — SIGNIFICANT CHANGE UP (ref 27–34)
MCHC RBC-ENTMCNC: 29.4 PG — SIGNIFICANT CHANGE UP (ref 27–34)
MCHC RBC-ENTMCNC: 32.7 GM/DL — SIGNIFICANT CHANGE UP (ref 32–36)
MCHC RBC-ENTMCNC: 32.9 GM/DL — SIGNIFICANT CHANGE UP (ref 32–36)
MCV RBC AUTO: 88.5 FL — SIGNIFICANT CHANGE UP (ref 80–100)
MCV RBC AUTO: 89.3 FL — SIGNIFICANT CHANGE UP (ref 80–100)
NRBC # BLD: 0 /100 WBCS — SIGNIFICANT CHANGE UP (ref 0–0)
NRBC # BLD: 0 /100 WBCS — SIGNIFICANT CHANGE UP (ref 0–0)
PLATELET # BLD AUTO: 360 K/UL — SIGNIFICANT CHANGE UP (ref 150–400)
PLATELET # BLD AUTO: 377 K/UL — SIGNIFICANT CHANGE UP (ref 150–400)
POTASSIUM SERPL-MCNC: 4.5 MMOL/L — SIGNIFICANT CHANGE UP (ref 3.5–5.3)
POTASSIUM SERPL-SCNC: 4.5 MMOL/L — SIGNIFICANT CHANGE UP (ref 3.5–5.3)
RBC # BLD: 3.27 M/UL — LOW (ref 3.8–5.2)
RBC # BLD: 3.39 M/UL — LOW (ref 3.8–5.2)
RBC # FLD: 16.1 % — HIGH (ref 10.3–14.5)
RBC # FLD: 16.1 % — HIGH (ref 10.3–14.5)
SODIUM SERPL-SCNC: 137 MMOL/L — SIGNIFICANT CHANGE UP (ref 135–145)
WBC # BLD: 11.51 K/UL — HIGH (ref 3.8–10.5)
WBC # BLD: 11.85 K/UL — HIGH (ref 3.8–10.5)
WBC # FLD AUTO: 11.51 K/UL — HIGH (ref 3.8–10.5)
WBC # FLD AUTO: 11.85 K/UL — HIGH (ref 3.8–10.5)

## 2020-05-08 PROCEDURE — 99233 SBSQ HOSP IP/OBS HIGH 50: CPT

## 2020-05-08 PROCEDURE — 73700 CT LOWER EXTREMITY W/O DYE: CPT | Mod: 26,RT

## 2020-05-08 RX ORDER — NITROFURANTOIN MACROCRYSTAL 50 MG
100 CAPSULE ORAL
Refills: 0 | Status: DISCONTINUED | OUTPATIENT
Start: 2020-05-08 | End: 2020-05-13

## 2020-05-08 RX ORDER — ENOXAPARIN SODIUM 100 MG/ML
40 INJECTION SUBCUTANEOUS EVERY 12 HOURS
Refills: 0 | Status: DISCONTINUED | OUTPATIENT
Start: 2020-05-08 | End: 2020-05-09

## 2020-05-08 RX ORDER — LACTOBACILLUS ACIDOPHILUS 100MM CELL
1 CAPSULE ORAL DAILY
Refills: 0 | Status: DISCONTINUED | OUTPATIENT
Start: 2020-05-08 | End: 2020-05-13

## 2020-05-08 RX ADMIN — Medication 1 SPRAY(S): at 12:08

## 2020-05-08 RX ADMIN — Medication 1 TABLET(S): at 17:59

## 2020-05-08 RX ADMIN — Medication 3 MILLIGRAM(S): at 23:00

## 2020-05-08 RX ADMIN — Medication 1 APPLICATION(S): at 06:23

## 2020-05-08 RX ADMIN — ATORVASTATIN CALCIUM 10 MILLIGRAM(S): 80 TABLET, FILM COATED ORAL at 22:59

## 2020-05-08 RX ADMIN — Medication 100 MILLIGRAM(S): at 18:00

## 2020-05-08 RX ADMIN — ENOXAPARIN SODIUM 70 MILLIGRAM(S): 100 INJECTION SUBCUTANEOUS at 18:00

## 2020-05-08 RX ADMIN — Medication 100 MILLIGRAM(S): at 22:59

## 2020-05-08 RX ADMIN — POLYETHYLENE GLYCOL 3350 17 GRAM(S): 17 POWDER, FOR SOLUTION ORAL at 12:04

## 2020-05-08 RX ADMIN — Medication 1 SPRAY(S): at 18:45

## 2020-05-08 RX ADMIN — METFORMIN HYDROCHLORIDE 1000 MILLIGRAM(S): 850 TABLET ORAL at 08:40

## 2020-05-08 RX ADMIN — INSULIN GLARGINE 20 UNIT(S): 100 INJECTION, SOLUTION SUBCUTANEOUS at 23:00

## 2020-05-08 RX ADMIN — Medication 100 MILLIGRAM(S): at 12:04

## 2020-05-08 RX ADMIN — Medication 650 MILLIGRAM(S): at 23:02

## 2020-05-08 RX ADMIN — Medication 1 SPRAY(S): at 06:25

## 2020-05-08 RX ADMIN — AMLODIPINE BESYLATE 5 MILLIGRAM(S): 2.5 TABLET ORAL at 06:23

## 2020-05-08 RX ADMIN — Medication 650 MILLIGRAM(S): at 12:21

## 2020-05-08 RX ADMIN — ENOXAPARIN SODIUM 70 MILLIGRAM(S): 100 INJECTION SUBCUTANEOUS at 06:24

## 2020-05-08 RX ADMIN — Medication 25 MILLIGRAM(S): at 06:25

## 2020-05-08 RX ADMIN — Medication 10 MILLIGRAM(S): at 12:04

## 2020-05-08 RX ADMIN — LIDOCAINE 1 PATCH: 4 CREAM TOPICAL at 12:04

## 2020-05-08 RX ADMIN — Medication 650 MILLIGRAM(S): at 00:41

## 2020-05-08 RX ADMIN — Medication 1 SPRAY(S): at 00:40

## 2020-05-08 RX ADMIN — GABAPENTIN 300 MILLIGRAM(S): 400 CAPSULE ORAL at 23:00

## 2020-05-08 RX ADMIN — FAMOTIDINE 20 MILLIGRAM(S): 10 INJECTION INTRAVENOUS at 12:04

## 2020-05-08 RX ADMIN — Medication 25 MILLIGRAM(S): at 17:59

## 2020-05-08 RX ADMIN — METFORMIN HYDROCHLORIDE 1000 MILLIGRAM(S): 850 TABLET ORAL at 17:59

## 2020-05-08 RX ADMIN — Medication 1 APPLICATION(S): at 22:59

## 2020-05-08 RX ADMIN — Medication 81 MILLIGRAM(S): at 12:04

## 2020-05-08 RX ADMIN — Medication 100 MILLIGRAM(S): at 06:24

## 2020-05-08 RX ADMIN — Medication 50 MILLIGRAM(S): at 06:24

## 2020-05-08 RX ADMIN — LIDOCAINE 1 PATCH: 4 CREAM TOPICAL at 19:47

## 2020-05-08 NOTE — PROGRESS NOTE ADULT - SUBJECTIVE AND OBJECTIVE BOX
Patient is a 69y old  Female who presents with a chief complaint of left frontal parietal CVA with right HP and aphasia, COVID+ 20 (07 May 2020 16:42)      HPI:  SHIREEN CASH is a 68yo female RH dominant with PMH of HTN, HLD, T2DM, hx CVA, GERD, known RBBB presented to [facility] ED on 20 with complaints of fever, chills, cough, chest pain, SOB for 5 days. Per patient, her  tested +COVID at an outpatient testing facility a few days prior, patient herself had tested negative at the time. They continued to share the same living space. ED workup showed +COVID and Mobitz type II block. EP was consulted and patient is s/p Medtronic Micra AV+ leadless pacemaker implantation via right femoral vein on 4/10/20. Hydroxychloroquine presumably not given due to prolonged QTc.     On 20, patient developed acute aphasia and right sided weakness. CT head showed an acute LEFT frontal/parietal CVA. CTA head/neck showed LEFT ICA stenosis but no occlusion. tPA was not administered due to timing and no penumbra. Placed on full dose lovenox as thoguht to be hypercoagulable secondary to COVID. Patient was cleared for regular consistency solids, thin liquids. Transferred to [facility] for acute inpatient rehab services 20. (2020 14:26)      PAST MEDICAL & SURGICAL HISTORY:  H/O gastroesophageal reflux (GERD)  HTN (hypertension)  DM (diabetes mellitus)  History of benign brain tumor      MEDICATIONS  (STANDING):  amLODIPine   Tablet 5 milliGRAM(s) Oral daily  ammonium lactate 12% Lotion 1 Application(s) Topical two times a day  aspirin enteric coated 81 milliGRAM(s) Oral daily  atorvastatin 10 milliGRAM(s) Oral at bedtime  BACItracin   Ointment 1 Application(s) Topical daily  benzonatate 100 milliGRAM(s) Oral every 8 hours  dextrose 5%. 1000 milliLiter(s) (50 mL/Hr) IV Continuous <Continuous>  dextrose 50% Injectable 12.5 Gram(s) IV Push once  dextrose 50% Injectable 25 Gram(s) IV Push once  dextrose 50% Injectable 25 Gram(s) IV Push once  enoxaparin Injectable 70 milliGRAM(s) SubCutaneous every 12 hours  famotidine    Tablet 20 milliGRAM(s) Oral daily  FLUoxetine 10 milliGRAM(s) Oral daily  gabapentin 300 milliGRAM(s) Oral at bedtime  hydrALAZINE 50 milliGRAM(s) Oral daily  insulin glargine Injectable (LANTUS) 20 Unit(s) SubCutaneous at bedtime  insulin lispro (HumaLOG) corrective regimen sliding scale   SubCutaneous three times a day before meals  insulin lispro (HumaLOG) corrective regimen sliding scale   SubCutaneous at bedtime  lidocaine   Patch 1 Patch Transdermal daily  melatonin 3 milliGRAM(s) Oral at bedtime  metFORMIN 1000 milliGRAM(s) Oral two times a day with meals  metoprolol tartrate 25 milliGRAM(s) Oral every 12 hours  nitrofurantoin monohydrate/macrocrystals (MACROBID) 100 milliGRAM(s) Oral two times a day with meals  polyethylene glycol 3350 17 Gram(s) Oral daily  sodium chloride 0.65% Nasal 1 Spray(s) Both Nostrils four times a day  sodium chloride 0.9% Bolus 1000 milliLiter(s) IV Bolus once    MEDICATIONS  (PRN):  acetaminophen   Tablet .. 650 milliGRAM(s) Oral every 6 hours PRN Temp greater or equal to 38C (100.4F), Mild Pain (1 - 3)  dextrose 40% Gel 15 Gram(s) Oral once PRN Blood Glucose LESS THAN 70 milliGRAM(s)/deciliter  glucagon  Injectable 1 milliGRAM(s) IntraMuscular once PRN Glucose LESS THAN 70 milligrams/deciliter  ibuprofen  Tablet. 400 milliGRAM(s) Oral every 12 hours PRN Moderate Pain (4 - 6)  lactulose Syrup 10 Gram(s) Oral daily PRN constipation      Allergies    No Known Allergies    Intolerances            PHYSICAL EXAM  69y  Vital Signs Last 24 Hrs  T(C): 37.1 (08 May 2020 08:38), Max: 37.1 (08 May 2020 08:38)  T(F): 98.7 (08 May 2020 08:38), Max: 98.7 (08 May 2020 08:38)  HR: 68 (08 May 2020 08:38) (68 - 81)  BP: 109/63 (08 May 2020 08:38) (109/63 - 126/60)  BP(mean): --  RR: 16 (08 May 2020 08:38) (16 - 16)  SpO2: 97% (08 May 2020 08:38) (93% - 97%)  Daily     Daily       RECENT LABS:                          9.6    11.85 )-----------( 360      ( 08 May 2020 06:39 )             29.2     05-08    137  |  103  |  24<H>  ----------------------------<  81  4.5   |  23  |  1.31<H>    Ca    9.0      08 May 2020 06:39    TPro  7.2  /  Alb  3.6  /  TBili  0.5  /  DBili  x   /  AST  21  /  ALT  25  /  AlkPhos  59  05-07    LIVER FUNCTIONS - ( 07 May 2020 06:30 )  Alb: 3.6 g/dL / Pro: 7.2 g/dL / ALK PHOS: 59 U/L / ALT: 25 U/L / AST: 21 U/L / GGT: x             Urinalysis Basic - ( 07 May 2020 18:25 )    Color: Yellow / Appearance: Clear / S.010 / pH: x  Gluc: x / Ketone: Negative  / Bili: Negative / Urobili: Negative   Blood: x / Protein: Negative / Nitrite: Positive   Leuk Esterase: Moderate / RBC: 0-4 /HPF / WBC 11-25 /HPF   Sq Epi: x / Non Sq Epi: Neg.-Few / Bacteria: Many /HPF          CAPILLARY BLOOD GLUCOSE      POCT Blood Glucose.: 107 mg/dL (08 May 2020 07:37)  POCT Blood Glucose.: 162 mg/dL (07 May 2020 20:51)  POCT Blood Glucose.: 121 mg/dL (07 May 2020 16:50) Patient is a 69y old  Female who presents with a chief complaint of left frontal parietal CVA with right HP and aphasia, COVID+ 20 (07 May 2020 16:42)      HPI:  SHIREEN CASH is a 68yo female RH dominant with PMH of HTN, HLD, T2DM, hx CVA, GERD, known RBBB presented to [facility] ED on 20 with complaints of fever, chills, cough, chest pain, SOB for 5 days. Per patient, her  tested +COVID at an outpatient testing facility a few days prior, patient herself had tested negative at the time. They continued to share the same living space. ED workup showed +COVID and Mobitz type II block. EP was consulted and patient is s/p Medtronic Micra AV+ leadless pacemaker implantation via right femoral vein on 4/10/20. Hydroxychloroquine presumably not given due to prolonged QTc.     On 20, patient developed acute aphasia and right sided weakness. CT head showed an acute LEFT frontal/parietal CVA. CTA head/neck showed LEFT ICA stenosis but no occlusion. tPA was not administered due to timing and no penumbra. Placed on full dose lovenox as thoguht to be hypercoagulable secondary to COVID. Patient was cleared for regular consistency solids, thin liquids. Transferred to [facility] for acute inpatient rehab services 20. (2020 14:26)      PAST MEDICAL & SURGICAL HISTORY:  H/O gastroesophageal reflux (GERD)  HTN (hypertension)  DM (diabetes mellitus)  History of benign brain tumor      MEDICATIONS  (STANDING):  amLODIPine   Tablet 5 milliGRAM(s) Oral daily  ammonium lactate 12% Lotion 1 Application(s) Topical two times a day  aspirin enteric coated 81 milliGRAM(s) Oral daily  atorvastatin 10 milliGRAM(s) Oral at bedtime  BACItracin   Ointment 1 Application(s) Topical daily  benzonatate 100 milliGRAM(s) Oral every 8 hours  dextrose 5%. 1000 milliLiter(s) (50 mL/Hr) IV Continuous <Continuous>  dextrose 50% Injectable 12.5 Gram(s) IV Push once  dextrose 50% Injectable 25 Gram(s) IV Push once  dextrose 50% Injectable 25 Gram(s) IV Push once  enoxaparin Injectable 70 milliGRAM(s) SubCutaneous every 12 hours  famotidine    Tablet 20 milliGRAM(s) Oral daily  FLUoxetine 10 milliGRAM(s) Oral daily  gabapentin 300 milliGRAM(s) Oral at bedtime  hydrALAZINE 50 milliGRAM(s) Oral daily  insulin glargine Injectable (LANTUS) 20 Unit(s) SubCutaneous at bedtime  insulin lispro (HumaLOG) corrective regimen sliding scale   SubCutaneous three times a day before meals  insulin lispro (HumaLOG) corrective regimen sliding scale   SubCutaneous at bedtime  lidocaine   Patch 1 Patch Transdermal daily  melatonin 3 milliGRAM(s) Oral at bedtime  metFORMIN 1000 milliGRAM(s) Oral two times a day with meals  metoprolol tartrate 25 milliGRAM(s) Oral every 12 hours  nitrofurantoin monohydrate/macrocrystals (MACROBID) 100 milliGRAM(s) Oral two times a day with meals  polyethylene glycol 3350 17 Gram(s) Oral daily  sodium chloride 0.65% Nasal 1 Spray(s) Both Nostrils four times a day  sodium chloride 0.9% Bolus 1000 milliLiter(s) IV Bolus once    MEDICATIONS  (PRN):  acetaminophen   Tablet .. 650 milliGRAM(s) Oral every 6 hours PRN Temp greater or equal to 38C (100.4F), Mild Pain (1 - 3)  dextrose 40% Gel 15 Gram(s) Oral once PRN Blood Glucose LESS THAN 70 milliGRAM(s)/deciliter  glucagon  Injectable 1 milliGRAM(s) IntraMuscular once PRN Glucose LESS THAN 70 milligrams/deciliter  ibuprofen  Tablet. 400 milliGRAM(s) Oral every 12 hours PRN Moderate Pain (4 - 6)  lactulose Syrup 10 Gram(s) Oral daily PRN constipation      Allergies    No Known Allergies    Intolerances            PHYSICAL EXAM  69y  Vital Signs Last 24 Hrs  T(C): 37.1 (08 May 2020 08:38), Max: 37.1 (08 May 2020 08:38)  T(F): 98.7 (08 May 2020 08:38), Max: 98.7 (08 May 2020 08:38)  HR: 68 (08 May 2020 08:38) (68 - 81)  BP: 109/63 (08 May 2020 08:38) (109/63 - 126/60)  BP(mean): --  RR: 16 (08 May 2020 08:38) (16 - 16)  SpO2: 97% (08 May 2020 08:38) (93% - 97%)  Daily     Daily       RECENT LABS:                          9.6    11.85 )-----------( 360      ( 08 May 2020 06:39 )             29.2     05-08    137  |  103  |  24<H>  ----------------------------<  81  4.5   |  23  |  1.31<H>    Ca    9.0      08 May 2020 06:39    TPro  7.2  /  Alb  3.6  /  TBili  0.5  /  DBili  x   /  AST  21  /  ALT  25  /  AlkPhos  59  05-07    LIVER FUNCTIONS - ( 07 May 2020 06:30 )  Alb: 3.6 g/dL / Pro: 7.2 g/dL / ALK PHOS: 59 U/L / ALT: 25 U/L / AST: 21 U/L / GGT: x             Urinalysis Basic - ( 07 May 2020 18:25 )    Color: Yellow / Appearance: Clear / S.010 / pH: x  Gluc: x / Ketone: Negative  / Bili: Negative / Urobili: Negative   Blood: x / Protein: Negative / Nitrite: Positive   Leuk Esterase: Moderate / RBC: 0-4 /HPF / WBC 11-25 /HPF   Sq Epi: x / Non Sq Epi: Neg.-Few / Bacteria: Many /HPF          CAPILLARY BLOOD GLUCOSE      POCT Blood Glucose.: 107 mg/dL (08 May 2020 07:37)  POCT Blood Glucose.: 162 mg/dL (07 May 2020 20:51)  POCT Blood Glucose.: 121 mg/dL (07 May 2020 16:50)

## 2020-05-08 NOTE — CONSULT NOTE ADULT - SUBJECTIVE AND OBJECTIVE BOX
History of Present Illness:  69y.o.  diabetic  Female with a history of Corona virus is s/p recent Left cerebral infarct with paresis of her right leg. She is on therapeutic Lovenox and developed a hematoma  and ecchymosis of her right calf  and admits to calf tenderness. She denies ischemic pedal pain at rest. I was requested to evaluate her LE circulation    PAST MEDICAL & SURGICAL HISTORY:  H/O gastroesophageal reflux (GERD)  HTN (hypertension)  DM (diabetes mellitus)  History of benign brain tumor      Allergies    No Known Allergies    Intolerances        MEDICATIONS  (STANDING):  amLODIPine   Tablet 5 milliGRAM(s) Oral daily  ammonium lactate 12% Lotion 1 Application(s) Topical two times a day  aspirin enteric coated 81 milliGRAM(s) Oral daily  atorvastatin 10 milliGRAM(s) Oral at bedtime  BACItracin   Ointment 1 Application(s) Topical daily  benzonatate 100 milliGRAM(s) Oral every 8 hours  dextrose 5%. 1000 milliLiter(s) (50 mL/Hr) IV Continuous <Continuous>  dextrose 50% Injectable 12.5 Gram(s) IV Push once  dextrose 50% Injectable 25 Gram(s) IV Push once  dextrose 50% Injectable 25 Gram(s) IV Push once  enoxaparin Injectable 70 milliGRAM(s) SubCutaneous every 12 hours  famotidine    Tablet 20 milliGRAM(s) Oral daily  FLUoxetine 10 milliGRAM(s) Oral daily  gabapentin 300 milliGRAM(s) Oral at bedtime  hydrALAZINE 50 milliGRAM(s) Oral daily  insulin glargine Injectable (LANTUS) 20 Unit(s) SubCutaneous at bedtime  insulin lispro (HumaLOG) corrective regimen sliding scale   SubCutaneous three times a day before meals  insulin lispro (HumaLOG) corrective regimen sliding scale   SubCutaneous at bedtime  lactobacillus acidophilus 1 Tablet(s) Oral daily  lidocaine   Patch 1 Patch Transdermal daily  melatonin 3 milliGRAM(s) Oral at bedtime  metFORMIN 1000 milliGRAM(s) Oral two times a day with meals  metoprolol tartrate 25 milliGRAM(s) Oral every 12 hours  nitrofurantoin monohydrate/macrocrystals (MACROBID) 100 milliGRAM(s) Oral two times a day with meals  polyethylene glycol 3350 17 Gram(s) Oral daily  sodium chloride 0.65% Nasal 1 Spray(s) Both Nostrils four times a day  sodium chloride 0.9% Bolus 1000 milliLiter(s) IV Bolus once    MEDICATIONS  (PRN):  acetaminophen   Tablet .. 650 milliGRAM(s) Oral every 6 hours PRN Temp greater or equal to 38C (100.4F), Mild Pain (1 - 3)  dextrose 40% Gel 15 Gram(s) Oral once PRN Blood Glucose LESS THAN 70 milliGRAM(s)/deciliter  glucagon  Injectable 1 milliGRAM(s) IntraMuscular once PRN Glucose LESS THAN 70 milligrams/deciliter  lactulose Syrup 10 Gram(s) Oral daily PRN constipation      Social History:  Smoking History: denies  Alcohol Use: denies    REVIEW OF SYSTEMS:  CONSTITUTIONAL: No  fevers or chills  EYES/ENT: No visual changes;  No vertigo or throat pain   NECK: No pain or stiffness  RESPIRATORY: No cough, wheezing, hemoptysis; No shortness of breath  CARDIOVASCULAR: No chest pain or palpitations  GASTROINTESTINAL: No abdominal or epigastric pain. No nausea, vomiting, or hematemesis; No diarrhea or constipation. No melena or hematochezia.  GENITOURINARY: No dysuria, frequency or hematuria  NEUROLOGICAL: ++ right leg paresis  SKIN: No itching, burning, rashes, or lesions   Vascular:  No lower extremity claudication, pedal rest pain or digital ulcers. ++ tenderness and bruising of right calf.  All other review of systems is negative unless indicated above.    PHYSICAL EXAM:  General:  On exam, the patient is alert  Female in no acute distress.  Vital Signs Last 24 Hrs  T(C): 37.1 (08 May 2020 08:38), Max: 37.1 (08 May 2020 08:38)  T(F): 98.7 (08 May 2020 08:38), Max: 98.7 (08 May 2020 08:38)  HR: 68 (08 May 2020 08:38) (68 - 81)  BP: 109/63 (08 May 2020 08:38) (109/63 - 126/60)  BP(mean): --  RR: 16 (08 May 2020 08:38) (16 - 16)  SpO2: 97% (08 May 2020 08:38) (93% - 97%)    Neck:  4+/4+ bilateral carotid pulses; no carotid bruit, no palpable cervical masses.  Heart:  Regular, no murmurs, rubs or gallops.    Lungs:  decreased BS at both bases    Chest:  No chest wall deformities.  Symmetrical chest expansion.   Abdomen: Soft and nontender.  No palpable masses.  No rebound, guarding or rigidity.  Extremities:  Both  feet are warm, pink with normal capillary refill times.  There are no digital ulcers or heel decubiti.  There is  moderate ecchymosis  and tenderness of right lateral and posterior calf.  The left  calf and thigh muscles are soft and nontender.  There are no palpable cords or limb cellulitis.  Fara's sign  is negative bilaterally.  There is no lower extremity edema, cyanosis, or rubor. No evidence for compartment syndrome  On examination of the peripheral pulses:  Left leg femoral pulse is 4/4   , popliteal pulse is  3/4  ,PT Pulse is  0, DP Pulse is 2/4  Right leg femoral pulse is  4/4  ,popliteal pulse is  3/4  , PT Pulse is 0 , DP Pulse is 2/4  Neurological:  ++ paresis of right lower leg                          9.8    11.51 )-----------( 377      ( 08 May 2020 12:45 )             30.0     05-08    137  |  103  |  24<H>  ----------------------------<  81  4.5   |  23  |  1.31<H>    Ca    9.0      08 May 2020 06:39    TPro  7.2  /  Alb  3.6  /  TBili  0.5  /  DBili  x   /  AST  21  /  ALT  25  /  AlkPhos  59  05-07            Radiology:

## 2020-05-08 NOTE — PROGRESS NOTE ADULT - COMMENTS
Patient seen with language line  in Czech, at bedside . Patient has continued pain on the right leg. Mainly in the calf. Denies paresthesia and lack of sensation on the right leg. LE dopplers negative. patient continues to deny urinary symptoms. Patient informed of treatment for UTI. Patient seen with language line  in Azerbaijani, at bedside Donald at 8:30 AM . Patient has continued pain on the right leg. Mainly in the calf. Denies paresthesia and lack of sensation on the right leg. LE dopplers negative. patient continues to deny urinary symptoms. Patient informed of treatment for UTI.    Therapy also reporting that patient has increased pain despite negative Xray and dopplers. Patient points primarily to back of calf as well as right lateral leg. Has ecchymoses which has extended down calf which is also TTP, no known trauma to area.

## 2020-05-08 NOTE — CONSULT NOTE ADULT - SUBJECTIVE AND OBJECTIVE BOX
Patient is a 69 year old woman admitted 4/23/20 to [facility].  She had been hospitalized on 4/8/20 with Covid 19 (+). She was found to have a Mobitz type II block and a pacemaker was placed. On 4/16 she was found aphasic with right-sided hemiparesis. A CT head noted a left frontoparietal infarct. A CTA Head and Neck noted Left ICA stenosis without occlusion. She was placed on ASA.  She was also  placed on full dose of lovenox with consideration of hypercoagulable state and COVID -19 (+). Today noted extensive ecchymosis involving the right posterior calf and patient complains of pain involving the same area. She notes mild HA generalized . She denies any change with difficulty with speech. She states no change with right arm weakness and inability to move right leg. She denies other neurological complaints. She denies other complaints.    PMH: As above.          HTN          HLD         DM             SH: No allergy    Exam: Awake, alert, speaks to this physician in French           Speech- decreased fluency, responds with 1-2 and occasionally 3 words in response to questions and searches for words, follows commands promptly, no dysarthria           Pupils 2.5mm, reactive, RHH              Motor tone and strength-RUE-4/5      LUE 5/5                                                 RLE-0/5,      LLE 5/5                              9.8    11.51 )-----------( 377      ( 08 May 2020 12:45 )             30.0

## 2020-05-08 NOTE — CONSULT NOTE ADULT - ASSESSMENT
69 y.o. diabetic female  with recent left CVA presents with a contusion and ecchymosis of her right calf. She has chronic PAD of both legs but no evidence for limb threatening ischemia or compartment syndrome. No vascular interventions are needed. The  Lovenox may  be continued. She may resume PT therapy.

## 2020-05-08 NOTE — PROGRESS NOTE ADULT - SUBJECTIVE AND OBJECTIVE BOX
RLE pain to touch  ecchymosis     ROS all others are neg     On RA     Vital Signs Last 24 Hrs    T(C): 37.1 (08 May 2020 08:38), Max: 37.1 (08 May 2020 08:38)  T(F): 98.7 (08 May 2020 08:38), Max: 98.7 (08 May 2020 08:38)  HR: 68 (08 May 2020 08:38) (68 - 81)  BP: 109/63 (08 May 2020 08:38) (109/63 - 126/60)  BP(mean): --  RR: 16 (08 May 2020 08:38) (16 - 16)  SpO2: 97% (08 May 2020 08:38) (93% - 97%)                            9.6    11.85 )-----------( 360      ( 08 May 2020 06:39 )             29.2     05-08    137  |  103  |  24<H>  ----------------------------<  81  4.5   |  23  |  1.31<H>    Ca    9.0      08 May 2020 06:39    TPro  7.2  /  Alb  3.6  /  TBili  0.5  /  DBili  x   /  AST  21  /  ALT  25  /  AlkPhos  59  05-07    MEDICATIONS  (STANDING):    amLODIPine   Tablet 5 milliGRAM(s) Oral daily  ammonium lactate 12% Lotion 1 Application(s) Topical two times a day  aspirin enteric coated 81 milliGRAM(s) Oral daily  atorvastatin 10 milliGRAM(s) Oral at bedtime  BACItracin   Ointment 1 Application(s) Topical daily  benzonatate 100 milliGRAM(s) Oral every 8 hours  dextrose 5%. 1000 milliLiter(s) (50 mL/Hr) IV Continuous <Continuous>  dextrose 50% Injectable 12.5 Gram(s) IV Push once  dextrose 50% Injectable 25 Gram(s) IV Push once  dextrose 50% Injectable 25 Gram(s) IV Push once  enoxaparin Injectable 70 milliGRAM(s) SubCutaneous every 12 hours  famotidine    Tablet 20 milliGRAM(s) Oral daily  FLUoxetine 10 milliGRAM(s) Oral daily  gabapentin 300 milliGRAM(s) Oral at bedtime  hydrALAZINE 50 milliGRAM(s) Oral daily  insulin glargine Injectable (LANTUS) 20 Unit(s) SubCutaneous at bedtime  insulin lispro (HumaLOG) corrective regimen sliding scale   SubCutaneous three times a day before meals  insulin lispro (HumaLOG) corrective regimen sliding scale   SubCutaneous at bedtime  lactobacillus acidophilus 1 Tablet(s) Oral daily  lidocaine   Patch 1 Patch Transdermal daily  melatonin 3 milliGRAM(s) Oral at bedtime  metFORMIN 1000 milliGRAM(s) Oral two times a day with meals  metoprolol tartrate 25 milliGRAM(s) Oral every 12 hours  nitrofurantoin monohydrate/macrocrystals (MACROBID) 100 milliGRAM(s) Oral two times a day with meals  polyethylene glycol 3350 17 Gram(s) Oral daily  sodium chloride 0.65% Nasal 1 Spray(s) Both Nostrils four times a day  sodium chloride 0.9% Bolus 1000 milliLiter(s) IV Bolus once

## 2020-05-08 NOTE — PROGRESS NOTE ADULT - ASSESSMENT
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 - neuro consulted for duration of therapeutic lovenox  - fall and aspiration precautions  - cont prozac for motor recovery  -right SAFO evalution with liner for support given foot drop and inversion. Discussed with orthotist, will teach patient and family to ACE wrap foot for foot drop and refer for casting as outpatient  -PRECAUTIONS: airborne, contact, aspiration, cardiac, fall    #right Leg pain: knee anterior and suprapatellar effusion. RESOLVED  -currently on therapeutic lovenox  -right knee effusion, likely due to instability and quad/ham weakness. Cold compress, ACE wrap PRN  -add lidoderm patch for pain daily, ibuprofen dcd  Doppler negative DVT   -Xray right knee  negative for acute fracture  -Posterior Calf Pain  -Le dopplers negative   -with bruising, r/o hematoma/compartment, calf soft, non tense, unable to palpate pulse however symmetric with left leg.  -CT RLE [facility]   -Vascular consulted - ANGEL ordered - to see patient, can monitor with lovenox for now    #Acute Hypoxic Respiratory Failure secondary to COVID-19 PNA  Date of COVID testin20  - Hydroxychloroquine not given, pt with prolonged QTc  - EKG - QTc: 500 (), incomplete RBBB  - Incentive spirometry, robitussin DM, ocean spray  -patient tolerating therapies without o2  -will need re-swab next week prior to dc    #Positive UA  -started on macrobid with bacid  -f/u urine culteres    #BRADY 2/2 to dehydration vs uti  - cre uptrending s/p 1 L overnight, bladder scan to r/o retention, bmp   encourage po fluids    #HTN  - Lopressor 25 q12, amlodipine 5 qd, hydralazine 50mg qd  - BP controlled     #type II Mobitz block, known RBBB  - s/p Medtronic Micra AV+ leadless pacemaker  - outpt EP followup    #T2DM.  - SSI ACHS  -diabetic educator consult appreciated :  ·	Start Metformin 1000 mg w/ breakfast and dinner. monitor for GI symptoms and attempt to wean/discontinue premeal for compliance and management at home  ·	will decrease insulin doses to avoid potential hypoglycemia.  Will adjust up or down as needed in response to metformin  ·	Decrease Glargine from 37 to 30 units -->20 units qhs   ·	DC lispro    #HLD  - cont simvastatin 20 qhs    #mood/sleep  - Prozac 10  - melatonin 3    #Pain Mgmt   - Tylenol PRN    #GI/Bowel Mgmt   - Continent  - pepcid    #/Bladder Mgmt   - Continent, PVR    #FEN   - Diet - Regular + Thins  [CCHO]  DASH/TLC      #skin:  abrasion back. bacitracin and DSD daily     #- DVT PPX  - Lovenox 70mg Q12h    #Case discussed in IDT rounds :  Patient requires set up UE dressing and set up for eating and grooming, min assist LE dressing in bed. Max assist toileting and mod-max assist shower transfers. 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 - neuro consulted for duration of therapeutic lovenox  - fall and aspiration precautions  - cont prozac for motor recovery  -right SAFO evalution with liner for support given foot drop and inversion. Discussed with orthotist, will teach patient and family to ACE wrap foot for foot drop and refer for casting as outpatient  -PRECAUTIONS: airborne, contact, aspiration, cardiac, fall  -NWB RLE except for stand pivot transfers. Hold ambulation and prolonged standing pending workup r/o compartment syndrome, discussed with staff    #right Leg pain: knee anterior and suprapatellar effusion. RESOLVED  -currently on therapeutic lovenox  -add lidoderm patch for pain daily, ibuprofen dcd  Doppler negative DVT   -Xray right knee  negative for acute fracture  -Posterior Calf Pain  -Le dopplers negative   -with bruising, r/o hematoma/compartment, calf soft, non tense, unable to palpate pulse however symmetric with left leg.  -CT RLE [facility]   -Vascular consulted - ANGEL ordered - to see patient, can monitor with lovenox for now  -case also discussed with hospitalist    #Acute Hypoxic Respiratory Failure secondary to COVID-19 PNA  Date of COVID testin20  - Hydroxychloroquine not given, pt with prolonged QTc  - EKG - QTc: 500 (), incomplete RBBB  - Incentive spirometry, robitussin DM, ocean spray  -patient tolerating therapies without o2  -will need re-swab next week prior to dc    #Positive UA  -started on macrobid with bacid  -f/u urine cultures    #BRADY 2/2 to dehydration vs UTI  - Cr uptrending s/p 1 L overnight, bladder scan to r/o retention  BMP   encourage po fluids    #HTN  - Lopressor 25 q12, amlodipine 5 qd, hydralazine 50mg qd  - BP controlled     #type II Mobitz block, known RBBB  - s/p Medtronic Micra AV+ leadless pacemaker  - outpt EP followup    #T2DM.  - SSI ACHS  -diabetic educator consult appreciated :  ·	Start Metformin 1000 mg w/ breakfast and dinner. monitor for GI symptoms and attempt to wean/discontinue premeal for compliance and management at home  ·	will decrease insulin doses to avoid potential hypoglycemia.  Will adjust up or down as needed in response to metformin  ·	Decrease Glargine from 37 to 30 units -->20 units qhs   ·	DC lispro  FS much better controlled     #HLD  - cont simvastatin 20 qhs    #mood/sleep  - Prozac 10  - melatonin 3    #Pain Mgmt   - Tylenol PRN    #GI/Bowel Mgmt   - Continent  - pepcid    #/Bladder Mgmt   - Continent, PVR    #FEN   - Diet - Regular + Thins  [CCHO]  DASH/TLC      #skin:  abrasion back. bacitracin and DSD daily     #- DVT PPX  - Lovenox 70mg Q12h    #Case discussed in IDT rounds :  Patient requires set up UE dressing and set up for eating and grooming, min assist LE dressing in bed. Max assist toileting and mod-max assist shower transfers. 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 - neuro consulted for duration of therapeutic lovenox  - fall and aspiration precautions  - cont prozac for motor recovery  -right SAFO evalution with liner for support given foot drop and inversion. Discussed with orthotist, will teach patient and family to ACE wrap foot for foot drop and refer for casting as outpatient  -PRECAUTIONS: airborne, contact, aspiration, cardiac, fall  -NWB RLE except for stand pivot transfers. Hold ambulation and prolonged standing pending workup r/o compartment syndrome, discussed with staff    #right Leg pain: knee anterior and suprapatellar effusion. RESOLVED  -currently on therapeutic lovenox  -add lidoderm patch for pain daily, ibuprofen dcd  Doppler negative DVT   -Xray right knee  negative for acute fracture  -Posterior Calf Pain  -Le dopplers negative   -with bruising, r/o hematoma/compartment, calf soft, non tense, unable to palpate pulse however symmetric with left leg.  -CT RLE [facility]   -Vascular consulted - ANGEL ordered - to see patient, can monitor with lovenox for now  -case also discussed with hospitalist    #Acute Hypoxic Respiratory Failure secondary to COVID-19 PNA  Date of COVID testin20  - Hydroxychloroquine not given, pt with prolonged QTc  - EKG - QTc: 500 (), incomplete RBBB  - Incentive spirometry, robitussin DM, ocean spray  -patient tolerating therapies without o2  -will need re-swab next week prior to dc    #Positive UA  -started on macrobid with bacid  -f/u urine cultures    #BRADY 2/2 to dehydration vs UTI  - Cr uptrending s/p 1 L overnight, bladder scan to r/o retention  BMP   encourage po fluids    #HTN  - Lopressor 25 q12, amlodipine 5 qd, hydralazine 50mg qd  - BP controlled     #type II Mobitz block, known RBBB  - s/p Medtronic Micra AV+ leadless pacemaker  - outpt EP followup    #T2DM.  - SSI ACHS  -diabetic educator consult appreciated :  ·	Start Metformin 1000 mg w/ breakfast and dinner. monitor for GI symptoms and attempt to wean/discontinue premeal for compliance and management at home  ·	will decrease insulin doses to avoid potential hypoglycemia.  Will adjust up or down as needed in response to metformin  ·	Decrease Glargine from 37 to 30 units -->20 units qhs   ·	DC lispro  FS much better controlled     #HLD  - cont simvastatin 20 qhs    #mood/sleep  - Prozac 10  - melatonin 3    #Pain Mgmt   - Tylenol PRN    #GI/Bowel Mgmt   - Continent  - pepcid    #/Bladder Mgmt   - Continent, PVR    #FEN   - Diet - Regular + Thins  [CCHO]  DASH/TLC      #skin:  abrasion back. bacitracin and DSD daily     #- DVT PPX  - Lovenox 70mg Q12h    #Case discussed in IDT rounds :  Patient requires set up UE dressing and set up for eating and grooming, min assist LE dressing in bed. Max assist toileting and mod-max assist shower transfers. Improvement in yes/no reliability and expression  -SAFO with liner right LE  -goas: will need diabetic education, min -mod assist transfers, mod assist for communication and all iADLs  -caregiver training  -target dc: change date to 5/15/20 with home Pt, OT, SLP and caregiver support    #LABS  BMP   Urine Cx and Abx mgt  RLE ANGEL and CT  Vascular neuro f/u      Addendum  18: 47:    < from: [facility] Physiol Extremity Lower 3+ Level, BI (20 @ 14:40) >  Impression:  Moderate tibial and small vessel PAD of both legs.    < end of copied text >    < from: CT Lower Extremity No Cont, Right (20 @ 16:23) >  IMPRESSION:  Intramuscular hematoma arising in the lateral aspect of the soleus measuring approximately 20 x 40 x 60 mm in greatest dimensions.    < end of copied text >    Discussed with neurology, no need for therapeutic dose lovenox with cva for long duration especially in setting of rle extremity as well.  CT report as above. D/W  vascular, would continue dvt ppx dose for COVID and monitor RLE hematoma. CBC stable. Trend.

## 2020-05-08 NOTE — CONSULT NOTE ADULT - ASSESSMENT
Patient is a 69 year old woman admitted 4/23/20 to [facility].  She had been hospitalized on 4/8/20 with Covid 19 (+). She was found to have a Mobitz type II block and a pacemaker was placed. On 4/16 she was found aphasic with right-sided hemiparesis. A CT head noted a left frontoparietal infarct. A CTA Head and Neck noted Left ICA stenosis without occlusion. She was placed on ASA.  She was also  placed on full dose of lovenox with consideration of hypercoagulable state and COVID -19 (+). Today noted extensive ecchymosis involving the right posterior calf and patient complains of pain involving the same area. She notes mild HA generalized . She denies any change with difficulty with speech. She states no change with right arm weakness and inability to move right leg. She denies other neurological complaints. She denies other complaints. Neurological exam as above.      1) CT head no contrast follow-up  2) With presence of significant ecchymosis involving right calf would not recommend continuation of lovenox for neurological indications with Left FP Infarct with Left ICA stenosis and COVID 19 (+).  3) Continue ASA 81 mg daily if no contraindication by Vascular Surgery  and no increase in ecchymosis.  4) As per Medicine with regard to indications for lovenox and risk of venous  thromboembolism with COVID 19 (+)

## 2020-05-08 NOTE — PROGRESS NOTE ADULT - ASSESSMENT
68 yo F     ·	COVID-19 Acute Hypoxic Respiratory Failure secondary to COVID-19 PNA:  resolved   ·	Left frontal and parietal CVA with residual right HP, foot drop, patricio-inattention aphasia  Started on full dose lovenox, for COVID-19 attributed hypercoagulable state, CVA    Neurology to see to define the anticoagulation duration     ·	New RLE ecchymosis ro post traumatic vs spontaneous hematoma  Check CT LE wo contrast, (BRADY) check ultrasound of the RLE if CT sub-optimal    Neurology to see to define the anticoagulation duration   Vascular surgery to see ro     Chronic medical conditions     Essential HTN  HLD  DM II   HPL  hx CVA  GERD  Type II Mobitz block, known RBBB  s/p Medtronic Micra AV+ leadless pacemaker    Reviewed w patient plan of care   Reviewed w rehab team plan of care   Full code

## 2020-05-08 NOTE — PROGRESS NOTE ADULT - EXTREMITIES COMMENTS
RLE with mild swelling. Bruise on left lateral knee extending down into the mid calf, slightly increased from prior. Patient calf very tender to palpation. Unable to palpate pulses on b/l LE and b/l LE mildly cool. Sensation intact. Calf soft. no pallor or cyanosis RLE with mild swelling. Bruise on left lateral knee extending down into the mid calf, slightly increased from prior. Patient calf very tender to palpation. Unable to palpate pulses on b/l LE and b/l LE mildly cool. Sensation intact. Calf soft. no pallor or cyanosis    +compressible calf, not ense

## 2020-05-08 NOTE — CHART NOTE - NSCHARTNOTEFT_GEN_A_CORE
NUTRITION FOLLOW UP    SOURCE: Patient [ )   Family [ ]    Medical Record (X), EMR, RN, minimal visitation with +COVID 19    DIET: Consistent carbohydrate, DASH/TLC    Pt tolerating diet and consuming % of meals. CDE following for uncontrolled DMII (A1c 10.0% on 4/7). Noted that Metformin was increased to 500mg BID with Lantus (20 units); pre-meal insulin d/c'd. Optimal glycemic control noted over the last 2 days. Last BM 5/6.     CURRENT WEIGHT:   (4/25) 165.5lbs   No new weight available     PERTINENT MEDS:   Pertinent Medications: MEDICATIONS  (STANDING):  amLODIPine   Tablet 5 milliGRAM(s) Oral daily  ammonium lactate 12% Lotion 1 Application(s) Topical two times a day  aspirin enteric coated 81 milliGRAM(s) Oral daily  atorvastatin 10 milliGRAM(s) Oral at bedtime  BACItracin   Ointment 1 Application(s) Topical daily  benzonatate 100 milliGRAM(s) Oral every 8 hours  dextrose 5%. 1000 milliLiter(s) (50 mL/Hr) IV Continuous <Continuous>  dextrose 50% Injectable 12.5 Gram(s) IV Push once  dextrose 50% Injectable 25 Gram(s) IV Push once  dextrose 50% Injectable 25 Gram(s) IV Push once  enoxaparin Injectable 70 milliGRAM(s) SubCutaneous every 12 hours  famotidine    Tablet 20 milliGRAM(s) Oral daily  FLUoxetine 10 milliGRAM(s) Oral daily  gabapentin 300 milliGRAM(s) Oral at bedtime  hydrALAZINE 50 milliGRAM(s) Oral daily  insulin glargine Injectable (LANTUS) 20 Unit(s) SubCutaneous at bedtime  insulin lispro (HumaLOG) corrective regimen sliding scale   SubCutaneous three times a day before meals  insulin lispro (HumaLOG) corrective regimen sliding scale   SubCutaneous at bedtime  lidocaine   Patch 1 Patch Transdermal daily  melatonin 3 milliGRAM(s) Oral at bedtime  metFORMIN 1000 milliGRAM(s) Oral two times a day with meals  metoprolol tartrate 25 milliGRAM(s) Oral every 12 hours  nitrofurantoin monohydrate/macrocrystals (MACROBID) 100 milliGRAM(s) Oral two times a day with meals  polyethylene glycol 3350 17 Gram(s) Oral daily  sodium chloride 0.65% Nasal 1 Spray(s) Both Nostrils four times a day  sodium chloride 0.9% Bolus 1000 milliLiter(s) IV Bolus once    MEDICATIONS  (PRN):  acetaminophen   Tablet .. 650 milliGRAM(s) Oral every 6 hours PRN Temp greater or equal to 38C (100.4F), Mild Pain (1 - 3)  dextrose 40% Gel 15 Gram(s) Oral once PRN Blood Glucose LESS THAN 70 milliGRAM(s)/deciliter  glucagon  Injectable 1 milliGRAM(s) IntraMuscular once PRN Glucose LESS THAN 70 milligrams/deciliter  ibuprofen  Tablet. 400 milliGRAM(s) Oral every 12 hours PRN Moderate Pain (4 - 6)  lactulose Syrup 10 Gram(s) Oral daily PRN constipation      PERTINENT LABS:  05-08 Na137 mmol/L Glu 81 mg/dL K+ 4.5 mmol/L Cr  1.31 mg/dL<H> BUN 24 mg/dL<H> 05-02 Phos 3.7 mg/dL 05-07 Alb 3.6 g/dL 04-17 Chol 103 mg/dL<L> LDL 40 mg/dL HDL 34 mg/dL<L> Trig 146 mg/dL    POCT (5/7) 114-162, (5/8) 107 mg/dl     SKIN:  no pressure ulcers, abrasion on lower back  EDEMA: none  LAST BM: 5/06    ESTIMATED NEEDS:   [X] no change since previous assessment  [ ] recalculated:     PREVIOUS NUTRITION DIAGNOSIS:    1. Inadequate oral intake: IMPROVING. Pt now consuming % of meals.   2. Altered nutrition related lab values- CDE following. Noted with improved glycemic control now.     NUTRITION DIAGNOSIS is :  (X)  Ongoing      NEW NUTRITION DIAGNOSIS: N/A    NUTRITION RECOMMENDATIONS:   1. Continue current nutrition plan of care: consistent carbohydrate, DASH/TLC  2. Diet education to pt's family prior to d/c (estimated discharge 5/15)  3. CDE following for medical management of DMII  4. Weekly weights    MONITORING AND EVALUATION:   1. Tolerance to diet prescription   2. PO intake  3. Weights  4. Labs  5. Follow Up per protocol     RD to remain available   Eve Little RDN   Pager #[phone].

## 2020-05-09 LAB
ANION GAP SERPL CALC-SCNC: 7 MMOL/L — SIGNIFICANT CHANGE UP (ref 5–17)
BUN SERPL-MCNC: 19 MG/DL — SIGNIFICANT CHANGE UP (ref 7–23)
CALCIUM SERPL-MCNC: 9 MG/DL — SIGNIFICANT CHANGE UP (ref 8.4–10.5)
CHLORIDE SERPL-SCNC: 101 MMOL/L — SIGNIFICANT CHANGE UP (ref 96–108)
CO2 SERPL-SCNC: 27 MMOL/L — SIGNIFICANT CHANGE UP (ref 22–31)
CREAT SERPL-MCNC: 1.06 MG/DL — SIGNIFICANT CHANGE UP (ref 0.5–1.3)
GLUCOSE SERPL-MCNC: 151 MG/DL — HIGH (ref 70–99)
HCT VFR BLD CALC: 28.8 % — LOW (ref 34.5–45)
HGB BLD-MCNC: 9.4 G/DL — LOW (ref 11.5–15.5)
MCHC RBC-ENTMCNC: 29 PG — SIGNIFICANT CHANGE UP (ref 27–34)
MCHC RBC-ENTMCNC: 32.6 GM/DL — SIGNIFICANT CHANGE UP (ref 32–36)
MCV RBC AUTO: 88.9 FL — SIGNIFICANT CHANGE UP (ref 80–100)
NRBC # BLD: 0 /100 WBCS — SIGNIFICANT CHANGE UP (ref 0–0)
PLATELET # BLD AUTO: 374 K/UL — SIGNIFICANT CHANGE UP (ref 150–400)
POTASSIUM SERPL-MCNC: 4.8 MMOL/L — SIGNIFICANT CHANGE UP (ref 3.5–5.3)
POTASSIUM SERPL-SCNC: 4.8 MMOL/L — SIGNIFICANT CHANGE UP (ref 3.5–5.3)
RBC # BLD: 3.24 M/UL — LOW (ref 3.8–5.2)
RBC # FLD: 15.8 % — HIGH (ref 10.3–14.5)
SODIUM SERPL-SCNC: 135 MMOL/L — SIGNIFICANT CHANGE UP (ref 135–145)
WBC # BLD: 11.43 K/UL — HIGH (ref 3.8–10.5)
WBC # FLD AUTO: 11.43 K/UL — HIGH (ref 3.8–10.5)

## 2020-05-09 PROCEDURE — 99233 SBSQ HOSP IP/OBS HIGH 50: CPT

## 2020-05-09 PROCEDURE — 99232 SBSQ HOSP IP/OBS MODERATE 35: CPT

## 2020-05-09 PROCEDURE — 70450 CT HEAD/BRAIN W/O DYE: CPT | Mod: 26

## 2020-05-09 RX ADMIN — Medication 650 MILLIGRAM(S): at 22:02

## 2020-05-09 RX ADMIN — Medication 1 SPRAY(S): at 13:01

## 2020-05-09 RX ADMIN — FAMOTIDINE 20 MILLIGRAM(S): 10 INJECTION INTRAVENOUS at 12:53

## 2020-05-09 RX ADMIN — LIDOCAINE 1 PATCH: 4 CREAM TOPICAL at 13:01

## 2020-05-09 RX ADMIN — Medication 1 APPLICATION(S): at 06:30

## 2020-05-09 RX ADMIN — Medication 25 MILLIGRAM(S): at 06:29

## 2020-05-09 RX ADMIN — ATORVASTATIN CALCIUM 10 MILLIGRAM(S): 80 TABLET, FILM COATED ORAL at 22:02

## 2020-05-09 RX ADMIN — Medication 100 MILLIGRAM(S): at 06:30

## 2020-05-09 RX ADMIN — POLYETHYLENE GLYCOL 3350 17 GRAM(S): 17 POWDER, FOR SOLUTION ORAL at 12:53

## 2020-05-09 RX ADMIN — Medication 100 MILLIGRAM(S): at 13:01

## 2020-05-09 RX ADMIN — Medication 100 MILLIGRAM(S): at 22:02

## 2020-05-09 RX ADMIN — Medication 650 MILLIGRAM(S): at 08:38

## 2020-05-09 RX ADMIN — LIDOCAINE 1 PATCH: 4 CREAM TOPICAL at 22:01

## 2020-05-09 RX ADMIN — Medication 4: at 12:37

## 2020-05-09 RX ADMIN — Medication 1 SPRAY(S): at 18:11

## 2020-05-09 RX ADMIN — Medication 1 SPRAY(S): at 06:30

## 2020-05-09 RX ADMIN — Medication 10 MILLIGRAM(S): at 12:53

## 2020-05-09 RX ADMIN — LIDOCAINE 1 PATCH: 4 CREAM TOPICAL at 00:57

## 2020-05-09 RX ADMIN — Medication 81 MILLIGRAM(S): at 12:53

## 2020-05-09 RX ADMIN — ENOXAPARIN SODIUM 40 MILLIGRAM(S): 100 INJECTION SUBCUTANEOUS at 06:29

## 2020-05-09 RX ADMIN — Medication 100 MILLIGRAM(S): at 18:12

## 2020-05-09 RX ADMIN — Medication 2: at 08:41

## 2020-05-09 RX ADMIN — AMLODIPINE BESYLATE 5 MILLIGRAM(S): 2.5 TABLET ORAL at 06:29

## 2020-05-09 RX ADMIN — Medication 50 MILLIGRAM(S): at 06:29

## 2020-05-09 RX ADMIN — METFORMIN HYDROCHLORIDE 1000 MILLIGRAM(S): 850 TABLET ORAL at 18:12

## 2020-05-09 RX ADMIN — Medication 1 SPRAY(S): at 00:58

## 2020-05-09 RX ADMIN — Medication 1 TABLET(S): at 12:52

## 2020-05-09 RX ADMIN — Medication 100 MILLIGRAM(S): at 08:38

## 2020-05-09 RX ADMIN — Medication 1 APPLICATION(S): at 18:11

## 2020-05-09 RX ADMIN — Medication 1 APPLICATION(S): at 13:00

## 2020-05-09 RX ADMIN — GABAPENTIN 300 MILLIGRAM(S): 400 CAPSULE ORAL at 22:02

## 2020-05-09 RX ADMIN — Medication 3 MILLIGRAM(S): at 22:03

## 2020-05-09 RX ADMIN — METFORMIN HYDROCHLORIDE 1000 MILLIGRAM(S): 850 TABLET ORAL at 08:38

## 2020-05-09 NOTE — PROGRESS NOTE ADULT - ASSESSMENT
68 yo F     ·	COVID-19 Acute Hypoxic Respiratory Failure secondary to COVID-19 PNA:  resolved   ·	Left frontal and parietal CVA with residual right HP, foot drop, patricio-inattention aphasia  Started on full dose lovenox, for COVID-19 attributed hypercoagulable state, CVA    Neurology to see to define the anticoagulation duration     ·	RLE hematoma presumably post traumatic from wheelchair leg rest ? vs spontaneous   vascular surgery not  likely compartment syndrome   Dose of lovenox reduced COVID hypercoag risk is present, neuro team assist appreciated  monitor hh trend/time   transfuse for hb < 7.5     Chronic medical conditions     Essential HTN  HLD  DM II   HPL  hx CVA  GERD  Type II Mobitz block, known RBBB  s/p Medtronic Micra AV+ leadless pacemaker    Reviewed w patient plan of care   Reviewed w rehab team plan of care   Full code 68 yo F     ·	COVID-19 Acute Hypoxic Respiratory Failure secondary to COVID-19 PNA:  resolved   ·	Left frontal and parietal CVA with residual right HP, foot drop, patricio-inattention aphasia  Started on full dose lovenox, for COVID-19 attributed hypercoagulable state, CVA    Neurology to see to define the anticoagulation duration     ·	RLE hematoma presumably post traumatic from wheelchair leg rest ? vs spontaneous   vascular surgery not  likely compartment syndrome   Dose of lovenox reduced COVID hypercoag risk is present, neuro team assist appreciated  monitor hh trend/time   transfuse for hb < 7.5   monitor closely     Chronic medical conditions     Essential HTN  HLD  DM II   HPL  hx CVA  GERD  Type II Mobitz block, known RBBB  s/p Medtronic Micra AV+ leadless pacemaker    Reviewed w patient plan of care   Reviewed w rehab team plan of care   Full code

## 2020-05-09 NOTE — PROGRESS NOTE ADULT - SUBJECTIVE AND OBJECTIVE BOX
pain    points  at her R calf    "Better"    ROS all others are neg    No FC     ICU Vital Signs Last 24 Hrs  T(C): 36.4 (09 May 2020 08:42), Max: 36.8 (08 May 2020 22:39)  T(F): 97.6 (09 May 2020 08:42), Max: 98.3 (08 May 2020 22:39)  HR: 75 (09 May 2020 08:42) (75 - 94)  BP: 119/70 (09 May 2020 08:42) (119/70 - 150/75)  BP(mean): --  ABP: --  ABP(mean): --  RR: 16 (09 May 2020 08:42) (16 - 17)  SpO2: 96% (09 May 2020 08:42) (96% - 97%)    MEDICATIONS  (STANDING):  amLODIPine   Tablet 5 milliGRAM(s) Oral daily  ammonium lactate 12% Lotion 1 Application(s) Topical two times a day  aspirin enteric coated 81 milliGRAM(s) Oral daily  atorvastatin 10 milliGRAM(s) Oral at bedtime  BACItracin   Ointment 1 Application(s) Topical daily  benzonatate 100 milliGRAM(s) Oral every 8 hours  dextrose 5%. 1000 milliLiter(s) (50 mL/Hr) IV Continuous <Continuous>  dextrose 50% Injectable 12.5 Gram(s) IV Push once  dextrose 50% Injectable 25 Gram(s) IV Push once  dextrose 50% Injectable 25 Gram(s) IV Push once  enoxaparin Injectable 40 milliGRAM(s) SubCutaneous every 12 hours  famotidine    Tablet 20 milliGRAM(s) Oral daily  FLUoxetine 10 milliGRAM(s) Oral daily  gabapentin 300 milliGRAM(s) Oral at bedtime  hydrALAZINE 50 milliGRAM(s) Oral daily  insulin glargine Injectable (LANTUS) 20 Unit(s) SubCutaneous at bedtime  insulin lispro (HumaLOG) corrective regimen sliding scale   SubCutaneous three times a day before meals  insulin lispro (HumaLOG) corrective regimen sliding scale   SubCutaneous at bedtime  lactobacillus acidophilus 1 Tablet(s) Oral daily  lidocaine   Patch 1 Patch Transdermal daily  melatonin 3 milliGRAM(s) Oral at bedtime  metFORMIN 1000 milliGRAM(s) Oral two times a day with meals  metoprolol tartrate 25 milliGRAM(s) Oral every 12 hours  nitrofurantoin monohydrate/macrocrystals (MACROBID) 100 milliGRAM(s) Oral two times a day with meals  polyethylene glycol 3350 17 Gram(s) Oral daily  sodium chloride 0.65% Nasal 1 Spray(s) Both Nostrils four times a day  sodium chloride 0.9% Bolus 1000 milliLiter(s) IV Bolus once    CBC Full  -  ( 09 May 2020 05:28 )  WBC Count : 11.43 K/uL  RBC Count : 3.24 M/uL  Hemoglobin : 9.4 g/dL  Hematocrit : 28.8 %  Platelet Count - Automated : 374 K/uL  Mean Cell Volume : 88.9 fl  Mean Cell Hemoglobin : 29.0 pg  Mean Cell Hemoglobin Concentration : 32.6 gm/dL  Auto Neutrophil # : x  Auto Lymphocyte # : x  Auto Monocyte # : x  Auto Eosinophil # : x  Auto Basophil # : x  Auto Neutrophil % : x  Auto Lymphocyte % : x  Auto Monocyte % : x  Auto Eosinophil % : x  Auto Basophil % : x    05-09    135  |  101  |  19  ----------------------------<  151<H>  4.8   |  27  |  1.06    Ca    9.0      09 May 2020 05:28

## 2020-05-09 NOTE — PROGRESS NOTE ADULT - SUBJECTIVE AND OBJECTIVE BOX
Pt. seen and examined at bedside.  No overnight events.  c/o R leg pain.      REVIEW OF SYSTEMS  Constitutional - No fever,  + fatigue  Neurological - No headaches   Musculoskeletal - No joint pain, No joint swelling     VITALS  T(C): 36.4 (20 @ 08:42), Max: 36.8 (20 @ 22:39)  HR: 75 (20 @ 08:42) (75 - 94)  BP: 119/70 (20 @ 08:42) (119/70 - 150/75)  RR: 16 (20 @ 08:42) (16 - 17)  SpO2: 96% (20 @ 08:42) (96% - 97%)  Wt(kg): --       MEDICATIONS   acetaminophen   Tablet .. 650 milliGRAM(s) every 6 hours PRN  amLODIPine   Tablet 5 milliGRAM(s) daily  ammonium lactate 12% Lotion 1 Application(s) two times a day  aspirin enteric coated 81 milliGRAM(s) daily  atorvastatin 10 milliGRAM(s) at bedtime  BACItracin   Ointment 1 Application(s) daily  benzonatate 100 milliGRAM(s) every 8 hours  dextrose 40% Gel 15 Gram(s) once PRN  dextrose 5%. 1000 milliLiter(s) <Continuous>  dextrose 50% Injectable 12.5 Gram(s) once  dextrose 50% Injectable 25 Gram(s) once  dextrose 50% Injectable 25 Gram(s) once  enoxaparin Injectable 40 milliGRAM(s) every 12 hours  famotidine    Tablet 20 milliGRAM(s) daily  FLUoxetine 10 milliGRAM(s) daily  gabapentin 300 milliGRAM(s) at bedtime  glucagon  Injectable 1 milliGRAM(s) once PRN  hydrALAZINE 50 milliGRAM(s) daily  insulin glargine Injectable (LANTUS) 20 Unit(s) at bedtime  insulin lispro (HumaLOG) corrective regimen sliding scale   three times a day before meals  insulin lispro (HumaLOG) corrective regimen sliding scale   at bedtime  lactobacillus acidophilus 1 Tablet(s) daily  lactulose Syrup 10 Gram(s) daily PRN  lidocaine   Patch 1 Patch daily  melatonin 3 milliGRAM(s) at bedtime  metFORMIN 1000 milliGRAM(s) two times a day with meals  metoprolol tartrate 25 milliGRAM(s) every 12 hours  nitrofurantoin monohydrate/macrocrystals (MACROBID) 100 milliGRAM(s) two times a day with meals  polyethylene glycol 3350 17 Gram(s) daily  sodium chloride 0.65% Nasal 1 Spray(s) four times a day  sodium chloride 0.9% Bolus 1000 milliLiter(s) once      RECENT LABS/IMAGING                        9.4    11.43 )-----------( 374      ( 09 May 2020 05:28 )             28.8         135  |  101  |  19  ----------------------------<  151<H>  4.8   |  27  |  1.06    Ca    9.0      09 May 2020 05:28        Urinalysis Basic - ( 07 May 2020 18:25 )    Color: Yellow / Appearance: Clear / S.010 / pH: x  Gluc: x / Ketone: Negative  / Bili: Negative / Urobili: Negative   Blood: x / Protein: Negative / Nitrite: Positive   Leuk Esterase: Moderate / RBC: 0-4 /HPF / WBC 11-25 /HPF   Sq Epi: x / Non Sq Epi: Neg.-Few / Bacteria: Many /HPF              POCT Blood Glucose.: 159 mg/dL (20 @ 08:24)  POCT Blood Glucose.: 139 mg/dL (20 @ 22:42)  POCT Blood Glucose.: 113 mg/dL (20 @ 17:58)  POCT Blood Glucose.: 139 mg/dL (20 @ 12:02)    ---------  PHYSICAL EXAM  Constitutional - NAD, Comfortable  Pulm - Breathing comfortably, No wheezing  Abd - Soft, NTND  Extremities - No edema, R LE CALF TENDERNESS  Neurologic Exam -                    Cognitive - Awake, Alert     Communication - APHASIA     Motor - R HP     Sensory - Intact to LT  Psychiatric - Mood WNL, Affect WNL    ASSESSMENT/PLAN  69y Female with functional deficits after COVID DEBILITY, 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 R LEG COMPARTMENT SYNDROME -> CT R LE C/W HEMATOMA  Continue 3hrs a day of comprehensive rehab program.

## 2020-05-09 NOTE — PROGRESS NOTE ADULT - SUBJECTIVE AND OBJECTIVE BOX
Patient is a 69 year old woman admitted 4/23/20 to [facility].  She had been hospitalized on 4/8/20 with Covid 19 (+). She was found to have a Mobitz type II block and a pacemaker was placed. On 4/16 she was found aphasic with right-sided hemiparesis. A CT head noted a left frontoparietal infarct. A CTA Head and Neck noted Left ICA stenosis without occlusion. She was placed on ASA.  She was also  placed on full dose of lovenox with consideration of hypercoagulable state and COVID -19 (+). Today noted extensive ecchymosis involving the right posterior calf and patient complains of pain involving the same area. She notes mild HA generalized . She denies any change with difficulty with speech. She states no change with right arm weakness and inability to move right leg. She denies other neurological complaints. She denies other complaints. Today, Saturday, the patient states the right calf pain is less. She continues with mild HA and no change with neurological complaints.    PMH: As above.          HTN          HLD         DM             SH: No allergy    Exam: Awake, alert, speaks to this physician in [place]           Speech- decreased fluency, responds with 1-2 and occasionally 3 words in response to questions and searches for words, follows commands promptly, no dysarthria           Pupils 2.5mm, reactive, RHH              Motor tone and strength-RUE-4/5      LUE 5/5                                                 RLE-0/5,      LLE 5/5

## 2020-05-09 NOTE — PROGRESS NOTE ADULT - ASSESSMENT
Patient is a 69 year old woman admitted 4/23/20 to [facility].  She had been hospitalized on 4/8/20 with Covid 19 (+). She was found to have a Mobitz type II block and a pacemaker was placed. On 4/16 she was found aphasic with right-sided hemiparesis. A CT head noted a left frontoparietal infarct. A CTA Head and Neck noted Left ICA stenosis without occlusion. She was placed on ASA.  She was also  placed on full dose of lovenox with consideration of hypercoagulable state and COVID -19 (+). Today noted extensive ecchymosis involving the right posterior calf and patient complains of pain involving the same area. She notes mild HA generalized . She denies any change with difficulty with speech. She states no change with right arm weakness and inability to move right leg. She denies other neurological complaints. She denies other complaints. Neurological exam as above. Today, Saturday, notes less calf pain. She continues with mild HA and no change with neurological complaints.      1) CT head no contrast follow-up  2) With presence of significant ecchymosis involving right calf would not recommend continuation of lovenox for neurological indications with Left FP Infarct with Left ICA stenosis and COVID 19 (+).  3) Continue ASA 81 mg daily if no contraindication by Vascular Surgery  and no increase in ecchymosis.  4) As per Medicine with regard to indications for lovenox and risk of venous  thromboembolism with COVID 19 (+)

## 2020-05-10 LAB
HCT VFR BLD CALC: 30 % — LOW (ref 34.5–45)
HGB BLD-MCNC: 9.8 G/DL — LOW (ref 11.5–15.5)
MCHC RBC-ENTMCNC: 28.7 PG — SIGNIFICANT CHANGE UP (ref 27–34)
MCHC RBC-ENTMCNC: 32.7 GM/DL — SIGNIFICANT CHANGE UP (ref 32–36)
MCV RBC AUTO: 88 FL — SIGNIFICANT CHANGE UP (ref 80–100)
NRBC # BLD: 0 /100 WBCS — SIGNIFICANT CHANGE UP (ref 0–0)
PLATELET # BLD AUTO: 425 K/UL — HIGH (ref 150–400)
RBC # BLD: 3.41 M/UL — LOW (ref 3.8–5.2)
RBC # FLD: 15.9 % — HIGH (ref 10.3–14.5)
WBC # BLD: 10.43 K/UL — SIGNIFICANT CHANGE UP (ref 3.8–10.5)
WBC # FLD AUTO: 10.43 K/UL — SIGNIFICANT CHANGE UP (ref 3.8–10.5)

## 2020-05-10 PROCEDURE — 70450 CT HEAD/BRAIN W/O DYE: CPT | Mod: 26

## 2020-05-10 PROCEDURE — 99233 SBSQ HOSP IP/OBS HIGH 50: CPT

## 2020-05-10 PROCEDURE — 99232 SBSQ HOSP IP/OBS MODERATE 35: CPT

## 2020-05-10 RX ADMIN — Medication 3 MILLIGRAM(S): at 23:07

## 2020-05-10 RX ADMIN — Medication 1 SPRAY(S): at 01:34

## 2020-05-10 RX ADMIN — INSULIN GLARGINE 20 UNIT(S): 100 INJECTION, SOLUTION SUBCUTANEOUS at 23:06

## 2020-05-10 RX ADMIN — Medication 650 MILLIGRAM(S): at 19:10

## 2020-05-10 RX ADMIN — ATORVASTATIN CALCIUM 10 MILLIGRAM(S): 80 TABLET, FILM COATED ORAL at 23:06

## 2020-05-10 RX ADMIN — Medication 25 MILLIGRAM(S): at 18:31

## 2020-05-10 RX ADMIN — GABAPENTIN 300 MILLIGRAM(S): 400 CAPSULE ORAL at 23:06

## 2020-05-10 RX ADMIN — FAMOTIDINE 20 MILLIGRAM(S): 10 INJECTION INTRAVENOUS at 12:20

## 2020-05-10 RX ADMIN — Medication 100 MILLIGRAM(S): at 08:32

## 2020-05-10 RX ADMIN — Medication 100 MILLIGRAM(S): at 23:06

## 2020-05-10 RX ADMIN — Medication 1 APPLICATION(S): at 06:36

## 2020-05-10 RX ADMIN — LIDOCAINE 1 PATCH: 4 CREAM TOPICAL at 01:01

## 2020-05-10 RX ADMIN — METFORMIN HYDROCHLORIDE 1000 MILLIGRAM(S): 850 TABLET ORAL at 08:32

## 2020-05-10 RX ADMIN — Medication 1 SPRAY(S): at 18:31

## 2020-05-10 RX ADMIN — Medication 1 TABLET(S): at 12:20

## 2020-05-10 RX ADMIN — Medication 1 APPLICATION(S): at 12:20

## 2020-05-10 RX ADMIN — Medication 1 SPRAY(S): at 00:07

## 2020-05-10 RX ADMIN — Medication 50 MILLIGRAM(S): at 05:58

## 2020-05-10 RX ADMIN — Medication 100 MILLIGRAM(S): at 13:00

## 2020-05-10 RX ADMIN — Medication 1 SPRAY(S): at 12:21

## 2020-05-10 RX ADMIN — AMLODIPINE BESYLATE 5 MILLIGRAM(S): 2.5 TABLET ORAL at 05:59

## 2020-05-10 RX ADMIN — Medication 1 SPRAY(S): at 06:00

## 2020-05-10 RX ADMIN — Medication 2: at 12:19

## 2020-05-10 RX ADMIN — Medication 10 MILLIGRAM(S): at 12:20

## 2020-05-10 RX ADMIN — Medication 25 MILLIGRAM(S): at 06:00

## 2020-05-10 RX ADMIN — Medication 1 APPLICATION(S): at 17:18

## 2020-05-10 RX ADMIN — Medication 100 MILLIGRAM(S): at 05:59

## 2020-05-10 RX ADMIN — LIDOCAINE 1 PATCH: 4 CREAM TOPICAL at 12:20

## 2020-05-10 NOTE — PROGRESS NOTE ADULT - ASSESSMENT
Patient is a 69 year old woman admitted 4/23/20 to [facility].  She had been hospitalized on 4/8/20 with Covid 19 (+). She was found to have a Mobitz type II block and a pacemaker was placed. On 4/16 she was found aphasic with right-sided hemiparesis. A CT head noted a left frontoparietal infarct. A CTA Head and Neck noted Left ICA stenosis without occlusion. She was placed on ASA.  She was also  placed on full dose of lovenox with consideration of hypercoagulable state and COVID -19 (+). Today noted extensive ecchymosis involving the right posterior calf and patient complains of pain involving the same area. She notes mild HA generalized . She denies any change with difficulty with speech. She states no change with right arm weakness and inability to move right leg. She denies other neurological complaints. She denies other complaints. Neurological exam as above. Today, Saturday, notes less calf pain. She continues with mild HA and no change with neurological complaints. Today, Sunday she denies any HA. She notes no change with neurological complaints.      1) CT head 5/9 as above new mild SAH left frontal. Evolving subacute left MCA infarcts, chronic right inf cerebellar hemispheric infarct, small arachnoid cyst adjacent to left sylvian fissure.  2) CT Head 5/10 as above mild SAH left frontal unchanged from CT Head 5/9.  No new hemorrhage. Stable chronic Left MCA infarcts. Stable chronic right inf cerebellar infarct. Stable chronic small subarachnoid cyst.  3) Agree with ASA 81 mg daily discontinued and Lovenox discontinued.  4) CT head no contrast follow-up in 2 days.

## 2020-05-10 NOTE — PROGRESS NOTE ADULT - SUBJECTIVE AND OBJECTIVE BOX
Pt. seen and examined at bedside.  No overnight events.  Still c/o right leg pain and weakness      REVIEW OF SYSTEMS  Constitutional - No fever,  No fatigue  Neurological - No headaches, + loss of strength  Musculoskeletal - No joint pain, No joint swelling, + muscle pain - RIGHT LEG    VITALS  T(C): 36.8 (05-10-20 @ 09:01), Max: 36.8 (05-10-20 @ 09:01)  HR: 75 (05-10-20 @ 09:01) (75 - 85)  BP: 111/68 (05-10-20 @ 09:01) (106/72 - 147/73)  RR: 18 (05-10-20 @ 09:01) (18 - 18)  SpO2: 95% (05-10-20 @ 09:01) (94% - 95%)  Wt(kg): --       MEDICATIONS   acetaminophen   Tablet .. 650 milliGRAM(s) every 6 hours PRN  amLODIPine   Tablet 5 milliGRAM(s) daily  ammonium lactate 12% Lotion 1 Application(s) two times a day  atorvastatin 10 milliGRAM(s) at bedtime  BACItracin   Ointment 1 Application(s) daily  benzonatate 100 milliGRAM(s) every 8 hours  dextrose 40% Gel 15 Gram(s) once PRN  dextrose 5%. 1000 milliLiter(s) <Continuous>  dextrose 50% Injectable 12.5 Gram(s) once  dextrose 50% Injectable 25 Gram(s) once  dextrose 50% Injectable 25 Gram(s) once  famotidine    Tablet 20 milliGRAM(s) daily  FLUoxetine 10 milliGRAM(s) daily  gabapentin 300 milliGRAM(s) at bedtime  glucagon  Injectable 1 milliGRAM(s) once PRN  hydrALAZINE 50 milliGRAM(s) daily  insulin glargine Injectable (LANTUS) 20 Unit(s) at bedtime  insulin lispro (HumaLOG) corrective regimen sliding scale   three times a day before meals  insulin lispro (HumaLOG) corrective regimen sliding scale   at bedtime  lactobacillus acidophilus 1 Tablet(s) daily  lactulose Syrup 10 Gram(s) daily PRN  lidocaine   Patch 1 Patch daily  melatonin 3 milliGRAM(s) at bedtime  metFORMIN 1000 milliGRAM(s) two times a day with meals  metoprolol tartrate 25 milliGRAM(s) every 12 hours  nitrofurantoin monohydrate/macrocrystals (MACROBID) 100 milliGRAM(s) two times a day with meals  polyethylene glycol 3350 17 Gram(s) daily  sodium chloride 0.65% Nasal 1 Spray(s) four times a day  sodium chloride 0.9% Bolus 1000 milliLiter(s) once      RECENT LABS/IMAGING                        9.8    10.43 )-----------( 425      ( 10 May 2020 06:50 )             30.0     05-09    135  |  101  |  19  ----------------------------<  151<H>  4.8   |  27  |  1.06    Ca    9.0      09 May 2020 05:28                  POCT Blood Glucose.: 184 mg/dL (05-10-20 @ 11:11)  POCT Blood Glucose.: 143 mg/dL (05-10-20 @ 07:40)  POCT Blood Glucose.: 110 mg/dL (05-09-20 @ 21:54)  POCT Blood Glucose.: 83 mg/dL (05-09-20 @ 17:32)    ---------  PHYSICAL EXAM  Constitutional - NAD, ComfortablH  Pulm - Breathing comfortably, No wheezing  Abd - Soft, NTND  Extremities - No edema, + RIGHT calf tenderness - STILL ECHYMOCTIC - NOT TENSE  Neurologic Exam -                    Cognitive - Awake, Alert     Communication - Fluent     Motor - + R HP -> R DF WEAKNESS     Sensory - Intact to LT  Psychiatric - Mood WNL, Affect WNL    ASSESSMENT/PLAN  69y Female with functional deficits after left frontal parietal CVA with right HP and aphasia, COVID+ 4/8/20  Continue current medical management - NO BRADY ON BMP  Pain - Tylenol PRN; LISBETH; LIDO  DVT PPX -   Active issues - RIGHT LE HEMATOMA; RIGHT HP/DF WEAKNESS  Continue 3hrs a day of comprehensive rehab program. Pt. seen and examined at bedside.  No overnight events.  Still c/o right leg pain and weakness      REVIEW OF SYSTEMS  Constitutional - No fever,  No fatigue  Neurological - No headaches, + loss of strength  Musculoskeletal - No joint pain, No joint swelling, + muscle pain - RIGHT LEG    VITALS  T(C): 36.8 (05-10-20 @ 09:01), Max: 36.8 (05-10-20 @ 09:01)  HR: 75 (05-10-20 @ 09:01) (75 - 85)  BP: 111/68 (05-10-20 @ 09:01) (106/72 - 147/73)  RR: 18 (05-10-20 @ 09:01) (18 - 18)  SpO2: 95% (05-10-20 @ 09:01) (94% - 95%)  Wt(kg): --       MEDICATIONS   acetaminophen   Tablet .. 650 milliGRAM(s) every 6 hours PRN  amLODIPine   Tablet 5 milliGRAM(s) daily  ammonium lactate 12% Lotion 1 Application(s) two times a day  atorvastatin 10 milliGRAM(s) at bedtime  BACItracin   Ointment 1 Application(s) daily  benzonatate 100 milliGRAM(s) every 8 hours  dextrose 40% Gel 15 Gram(s) once PRN  dextrose 5%. 1000 milliLiter(s) <Continuous>  dextrose 50% Injectable 12.5 Gram(s) once  dextrose 50% Injectable 25 Gram(s) once  dextrose 50% Injectable 25 Gram(s) once  famotidine    Tablet 20 milliGRAM(s) daily  FLUoxetine 10 milliGRAM(s) daily  gabapentin 300 milliGRAM(s) at bedtime  glucagon  Injectable 1 milliGRAM(s) once PRN  hydrALAZINE 50 milliGRAM(s) daily  insulin glargine Injectable (LANTUS) 20 Unit(s) at bedtime  insulin lispro (HumaLOG) corrective regimen sliding scale   three times a day before meals  insulin lispro (HumaLOG) corrective regimen sliding scale   at bedtime  lactobacillus acidophilus 1 Tablet(s) daily  lactulose Syrup 10 Gram(s) daily PRN  lidocaine   Patch 1 Patch daily  melatonin 3 milliGRAM(s) at bedtime  metFORMIN 1000 milliGRAM(s) two times a day with meals  metoprolol tartrate 25 milliGRAM(s) every 12 hours  nitrofurantoin monohydrate/macrocrystals (MACROBID) 100 milliGRAM(s) two times a day with meals  polyethylene glycol 3350 17 Gram(s) daily  sodium chloride 0.65% Nasal 1 Spray(s) four times a day  sodium chloride 0.9% Bolus 1000 milliLiter(s) once      RECENT LABS/IMAGING                        9.8    10.43 )-----------( 425      ( 10 May 2020 06:50 )             30.0     05-09    135  |  101  |  19  ----------------------------<  151<H>  4.8   |  27  |  1.06    Ca    9.0      09 May 2020 05:28                  POCT Blood Glucose.: 184 mg/dL (05-10-20 @ 11:11)  POCT Blood Glucose.: 143 mg/dL (05-10-20 @ 07:40)  POCT Blood Glucose.: 110 mg/dL (05-09-20 @ 21:54)  POCT Blood Glucose.: 83 mg/dL (05-09-20 @ 17:32)    ---------  PHYSICAL EXAM  Constitutional - NAD, ComfortablH  Pulm - Breathing comfortably, No wheezing  Abd - Soft, NTND  Extremities - No edema, + RIGHT calf tenderness - STILL ECHYMOCTIC - NOT TENSE; DIFFICULT TO PALPATE PEDAL PULSES  Neurologic Exam -                    Cognitive - Awake, Alert     Communication - Fluent     Motor - + R HP -> R DF WEAKNESS     Sensory - Intact to LT  Psychiatric - Mood WNL, Affect WNL    ASSESSMENT/PLAN  69y Female with functional deficits after left frontal parietal CVA with right HP and aphasia, COVID+ 4/8/20  Continue current medical management - NO BRADY ON BMP  Pain - Tylenol PRN; LISBETH; LIDO  DVT PPX - LOVENOX D/C'D   Active issues - RIGHT LE HEMATOMA (LOW SUSPICION FOR COMPARTMENT SYNDROME); PRIMARY TEAM TO CONSIDER ASA PROPHYLAXIS IF RIGHT LEG HEMATOMA STABLE; RIGHT HP/DF WEAKNESS;   Continue 3hrs a day of comprehensive rehab program.

## 2020-05-10 NOTE — PROGRESS NOTE ADULT - SUBJECTIVE AND OBJECTIVE BOX
Patient is a 69 year old woman admitted 4/23/20 to [facility].  She had been hospitalized on 4/8/20 with Covid 19 (+). She was found to have a Mobitz type II block and a pacemaker was placed. On 4/16 she was found aphasic with right-sided hemiparesis. A CT head noted a left frontoparietal infarct. A CTA Head and Neck noted Left ICA stenosis without occlusion. She was placed on ASA.  She was also  placed on full dose of lovenox with consideration of hypercoagulable state and COVID -19 (+). Today noted extensive ecchymosis involving the right posterior calf and patient complains of pain involving the same area. She notes mild HA generalized . She denies any change with difficulty with speech. She states no change with right arm weakness and inability to move right leg. She denies other neurological complaints. She denies other complaints. Today, Saturday, the patient states the right calf pain is less. She continues with mild HA and no change with neurological complaints. Today, Sunday, she denies HA. She denies any change with her neurological complaints.    PMH: As above.          HTN          HLD         DM             SH: No allergy    Exam: Awake, alert, speaks to this physician in [place]           Speech- decreased fluency, responds with 1-2 and occasionally 3 words in response to questions and searches for words, follows commands promptly, no dysarthria           Pupils 2.5mm, reactive, RHH              Motor tone and strength-RUE-4/5      LUE 5/5                                                 RLE-0/5,      LLE 5/5               < from: CT Head No Cont (05.10.20 @ 09:18) >    TECHNIQUE: Noncontrast CT of the head. Multiplanar reformations are submitted.    FINDINGS: Mild subarachnoid hemorrhage in the left frontal lobe, unchanged. Stable chronic left MCA territory infarcts. Stable chronic right inferior cerebellar hemisphere infarcts. Consider MRI as clinically warranted. Stable left frontal lobe arachnoid cyst.    There is periventricular and subcortical white matter hypodensity without mass effect, nonspecific, likely representing mild chronic microvascular ischemic changes. There is no compelling evidence for an acute transcortical infarction. There is no evidence of mass, mass effect, midline shift or extra-axial fluid collection. The lateral ventricles and cortical sulci are age-appropriate in size and configuration. The orbits, mastoid air cells and visualized paranasal sinuses are unremarkable. The calvariumis intact.    IMPRESSION: Mild subarachnoid hemorrhage in the left frontal lobe, unchanged. No hydrocephalus. No new hemorrhage.      < from: CT Head No Cont (05.09.20 @ 17:50) >    FINDINGS: Small arachnoid cyst adjacent to the left sylvian fissure on image 8, series 2 measures 2.2 x 2.2 cm, unchanged in size but demonstrates subtle increased density within it.     Subacute/chronic left frontal infarct with mild subarachnoid hemorrhage is demonstrated on image 15, series 2.    Evolving subacute/chronic left parietal lobe infarct. Chronic right inferior cerebellar hemisphere infarct.    There is periventricular and subcortical white matter hypodensity without mass effect, nonspecific, likely representing mild chronic microvascular ischemic changes. There is no compelling evidence for an acute transcortical infarction. There is no other evidence of mass, mass effect, midline shift or extra-axial fluid collection. The lateral ventricles and cortical sulci are age-appropriate in size and configuration. The orbits, mastoid air cells and visualized paranasal sinuses are unremarkable. The calvarium is intact.    IMPRESSION:  New mild subarachnoid hemorrhage in the paramedian left frontal lobe.Evolving subacute/chronic left MCA territory infarcts.. Findings discussed with Dr. Cross.          <

## 2020-05-10 NOTE — PROGRESS NOTE ADULT - ASSESSMENT
68 yo F     ·	COVID-19 Acute Hypoxic Respiratory Failure secondary to COVID-19 PNA:  resolved   ·	Left frontal and parietal CVA with residual right HP, foot drop, patricio-inattention aphasia  Started on full dose lovenox, for COVID-19 attributed hypercoagulable state, CVA    Neurology to see to define the anticoagulation duration     ·	RLE hematoma presumably post traumatic from wheelchair leg rest ? vs spontaneous (pain improving)    vascular surgery not  likely compartment syndrome   Dose of lovenox reduced COVID hypercoag risk is present, neuro team assist appreciated  monitor hh trend/time   transfuse for hb < 7.5   monitor closely     Chronic medical conditions     Essential HTN  HLD  DM II   HPL  hx CVA  GERD  Type II Mobitz block, known RBBB  s/p Medtronic Micra AV+ leadless pacemaker    Reviewed w patient plan of care   Reviewed w rehab team plan of care   Full code 68 yo F     ·	HA SAH clinically, serial CT head x 2 x 24 hrs stable across time (reviewed w neuro team), off lovenox, HA resolved neuro team assist appreciated, hold ac   (serial CT head viewed analyzed)  ·	COVID-19 Acute Hypoxic Respiratory Failure secondary to COVID-19 PNA:  resolved   ·	Left frontal and parietal CVA with residual right HP, foot drop, patricio-inattention aphasia      ·	RLE hematoma presumably post traumatic from wheelchair leg rest ? vs spontaneous (pain improving)    vascular surgery not  likely compartment syndrome   off lovenox   monitor hh trend/time   transfuse for hb < 7.5   monitor closely     Chronic medical conditions     Essential HTN  HLD  DM II   HPL  hx CVA  GERD  Type II Mobitz block, known RBBB  s/p Medtronic Micra AV+ leadless pacemaker    Reviewed w patient plan of care   Reviewed w rehab team plan of care   Full code

## 2020-05-10 NOTE — PROGRESS NOTE ADULT - SUBJECTIVE AND OBJECTIVE BOX
seen and examined     RLE pain better     ros all others are neg     Vital Signs Last 24 Hrs    T(C): 36.8 (10 May 2020 09:01), Max: 36.8 (10 May 2020 09:01)  T(F): 98.2 (10 May 2020 09:01), Max: 98.2 (10 May 2020 09:01)  HR: 75 (10 May 2020 09:01) (75 - 85)  BP: 111/68 (10 May 2020 09:01) (106/72 - 147/73)  BP(mean): --  RR: 18 (10 May 2020 09:01) (18 - 18)  SpO2: 95% (10 May 2020 09:01) (94% - 95%)    MEDICATIONS  (STANDING):    amLODIPine   Tablet 5 milliGRAM(s) Oral daily  ammonium lactate 12% Lotion 1 Application(s) Topical two times a day  atorvastatin 10 milliGRAM(s) Oral at bedtime  BACItracin   Ointment 1 Application(s) Topical daily  benzonatate 100 milliGRAM(s) Oral every 8 hours  dextrose 5%. 1000 milliLiter(s) (50 mL/Hr) IV Continuous <Continuous>  dextrose 50% Injectable 12.5 Gram(s) IV Push once  dextrose 50% Injectable 25 Gram(s) IV Push once  dextrose 50% Injectable 25 Gram(s) IV Push once  famotidine    Tablet 20 milliGRAM(s) Oral daily  FLUoxetine 10 milliGRAM(s) Oral daily  gabapentin 300 milliGRAM(s) Oral at bedtime  hydrALAZINE 50 milliGRAM(s) Oral daily  insulin glargine Injectable (LANTUS) 20 Unit(s) SubCutaneous at bedtime  insulin lispro (HumaLOG) corrective regimen sliding scale   SubCutaneous three times a day before meals  insulin lispro (HumaLOG) corrective regimen sliding scale   SubCutaneous at bedtime  lactobacillus acidophilus 1 Tablet(s) Oral daily  lidocaine   Patch 1 Patch Transdermal daily  melatonin 3 milliGRAM(s) Oral at bedtime  metFORMIN 1000 milliGRAM(s) Oral two times a day with meals  metoprolol tartrate 25 milliGRAM(s) Oral every 12 hours  nitrofurantoin monohydrate/macrocrystals (MACROBID) 100 milliGRAM(s) Oral two times a day with meals  polyethylene glycol 3350 17 Gram(s) Oral daily  sodium chloride 0.65% Nasal 1 Spray(s) Both Nostrils four times a day  sodium chloride 0.9% Bolus 1000 milliLiter(s) IV Bolus once                          9.8    10.43 )-----------( 425      ( 10 May 2020 06:50 )             30.0     05-09    135  |  101  |  19  ----------------------------<  151<H>  4.8   |  27  |  1.06    Ca    9.0      09 May 2020 05:28 seen and examined     RLE pain much better   no neuro deficits     no ha     ros all others are neg     Vital Signs Last 24 Hrs    T(C): 36.8 (10 May 2020 09:01), Max: 36.8 (10 May 2020 09:01)  T(F): 98.2 (10 May 2020 09:01), Max: 98.2 (10 May 2020 09:01)  HR: 75 (10 May 2020 09:01) (75 - 85)  BP: 111/68 (10 May 2020 09:01) (106/72 - 147/73)  BP(mean): --  RR: 18 (10 May 2020 09:01) (18 - 18)  SpO2: 95% (10 May 2020 09:01) (94% - 95%)    MEDICATIONS  (STANDING):    amLODIPine   Tablet 5 milliGRAM(s) Oral daily  ammonium lactate 12% Lotion 1 Application(s) Topical two times a day  atorvastatin 10 milliGRAM(s) Oral at bedtime  BACItracin   Ointment 1 Application(s) Topical daily  benzonatate 100 milliGRAM(s) Oral every 8 hours  dextrose 5%. 1000 milliLiter(s) (50 mL/Hr) IV Continuous <Continuous>  dextrose 50% Injectable 12.5 Gram(s) IV Push once  dextrose 50% Injectable 25 Gram(s) IV Push once  dextrose 50% Injectable 25 Gram(s) IV Push once  famotidine    Tablet 20 milliGRAM(s) Oral daily  FLUoxetine 10 milliGRAM(s) Oral daily  gabapentin 300 milliGRAM(s) Oral at bedtime  hydrALAZINE 50 milliGRAM(s) Oral daily  insulin glargine Injectable (LANTUS) 20 Unit(s) SubCutaneous at bedtime  insulin lispro (HumaLOG) corrective regimen sliding scale   SubCutaneous three times a day before meals  insulin lispro (HumaLOG) corrective regimen sliding scale   SubCutaneous at bedtime  lactobacillus acidophilus 1 Tablet(s) Oral daily  lidocaine   Patch 1 Patch Transdermal daily  melatonin 3 milliGRAM(s) Oral at bedtime  metFORMIN 1000 milliGRAM(s) Oral two times a day with meals  metoprolol tartrate 25 milliGRAM(s) Oral every 12 hours  nitrofurantoin monohydrate/macrocrystals (MACROBID) 100 milliGRAM(s) Oral two times a day with meals  polyethylene glycol 3350 17 Gram(s) Oral daily  sodium chloride 0.65% Nasal 1 Spray(s) Both Nostrils four times a day  sodium chloride 0.9% Bolus 1000 milliLiter(s) IV Bolus once                          9.8    10.43 )-----------( 425      ( 10 May 2020 06:50 )             30.0     05-09    135  |  101  |  19  ----------------------------<  151<H>  4.8   |  27  |  1.06    Ca    9.0      09 May 2020 05:28

## 2020-05-11 LAB
-  AMIKACIN: SIGNIFICANT CHANGE UP
-  AMPICILLIN/SULBACTAM: SIGNIFICANT CHANGE UP
-  AMPICILLIN: SIGNIFICANT CHANGE UP
-  AZTREONAM: SIGNIFICANT CHANGE UP
-  CEFAZOLIN: SIGNIFICANT CHANGE UP
-  CEFEPIME: SIGNIFICANT CHANGE UP
-  CEFOXITIN: SIGNIFICANT CHANGE UP
-  CEFTRIAXONE: SIGNIFICANT CHANGE UP
-  CIPROFLOXACIN: SIGNIFICANT CHANGE UP
-  GENTAMICIN: SIGNIFICANT CHANGE UP
-  IMIPENEM: SIGNIFICANT CHANGE UP
-  LEVOFLOXACIN: SIGNIFICANT CHANGE UP
-  MEROPENEM: SIGNIFICANT CHANGE UP
-  NITROFURANTOIN: SIGNIFICANT CHANGE UP
-  PIPERACILLIN/TAZOBACTAM: SIGNIFICANT CHANGE UP
-  TIGECYCLINE: SIGNIFICANT CHANGE UP
-  TOBRAMYCIN: SIGNIFICANT CHANGE UP
-  TRIMETHOPRIM/SULFAMETHOXAZOLE: SIGNIFICANT CHANGE UP
ALBUMIN SERPL ELPH-MCNC: 3.7 G/DL — SIGNIFICANT CHANGE UP (ref 3.3–5)
ALP SERPL-CCNC: 61 U/L — SIGNIFICANT CHANGE UP (ref 40–120)
ALT FLD-CCNC: 17 U/L — SIGNIFICANT CHANGE UP (ref 10–45)
ANION GAP SERPL CALC-SCNC: 11 MMOL/L — SIGNIFICANT CHANGE UP (ref 5–17)
AST SERPL-CCNC: 19 U/L — SIGNIFICANT CHANGE UP (ref 10–40)
BASOPHILS # BLD AUTO: 0.06 K/UL — SIGNIFICANT CHANGE UP (ref 0–0.2)
BASOPHILS NFR BLD AUTO: 0.6 % — SIGNIFICANT CHANGE UP (ref 0–2)
BILIRUB SERPL-MCNC: 0.7 MG/DL — SIGNIFICANT CHANGE UP (ref 0.2–1.2)
BUN SERPL-MCNC: 14 MG/DL — SIGNIFICANT CHANGE UP (ref 7–23)
CALCIUM SERPL-MCNC: 9.6 MG/DL — SIGNIFICANT CHANGE UP (ref 8.4–10.5)
CHLORIDE SERPL-SCNC: 99 MMOL/L — SIGNIFICANT CHANGE UP (ref 96–108)
CO2 SERPL-SCNC: 24 MMOL/L — SIGNIFICANT CHANGE UP (ref 22–31)
CREAT SERPL-MCNC: 0.77 MG/DL — SIGNIFICANT CHANGE UP (ref 0.5–1.3)
CULTURE RESULTS: SIGNIFICANT CHANGE UP
EOSINOPHIL # BLD AUTO: 0.38 K/UL — SIGNIFICANT CHANGE UP (ref 0–0.5)
EOSINOPHIL NFR BLD AUTO: 3.6 % — SIGNIFICANT CHANGE UP (ref 0–6)
GLUCOSE SERPL-MCNC: 110 MG/DL — HIGH (ref 70–99)
HCT VFR BLD CALC: 31.6 % — LOW (ref 34.5–45)
HGB BLD-MCNC: 10.2 G/DL — LOW (ref 11.5–15.5)
IMM GRANULOCYTES NFR BLD AUTO: 1 % — SIGNIFICANT CHANGE UP (ref 0–1.5)
LYMPHOCYTES # BLD AUTO: 2.81 K/UL — SIGNIFICANT CHANGE UP (ref 1–3.3)
LYMPHOCYTES # BLD AUTO: 26.6 % — SIGNIFICANT CHANGE UP (ref 13–44)
MCHC RBC-ENTMCNC: 28.4 PG — SIGNIFICANT CHANGE UP (ref 27–34)
MCHC RBC-ENTMCNC: 32.3 GM/DL — SIGNIFICANT CHANGE UP (ref 32–36)
MCV RBC AUTO: 88 FL — SIGNIFICANT CHANGE UP (ref 80–100)
METHOD TYPE: SIGNIFICANT CHANGE UP
MONOCYTES # BLD AUTO: 1.06 K/UL — HIGH (ref 0–0.9)
MONOCYTES NFR BLD AUTO: 10 % — SIGNIFICANT CHANGE UP (ref 2–14)
NEUTROPHILS # BLD AUTO: 6.14 K/UL — SIGNIFICANT CHANGE UP (ref 1.8–7.4)
NEUTROPHILS NFR BLD AUTO: 58.2 % — SIGNIFICANT CHANGE UP (ref 43–77)
NRBC # BLD: 0 /100 WBCS — SIGNIFICANT CHANGE UP (ref 0–0)
ORGANISM # SPEC MICROSCOPIC CNT: SIGNIFICANT CHANGE UP
ORGANISM # SPEC MICROSCOPIC CNT: SIGNIFICANT CHANGE UP
PLATELET # BLD AUTO: 411 K/UL — HIGH (ref 150–400)
POTASSIUM SERPL-MCNC: 4.4 MMOL/L — SIGNIFICANT CHANGE UP (ref 3.5–5.3)
POTASSIUM SERPL-SCNC: 4.4 MMOL/L — SIGNIFICANT CHANGE UP (ref 3.5–5.3)
PROT SERPL-MCNC: 7.8 G/DL — SIGNIFICANT CHANGE UP (ref 6–8.3)
RBC # BLD: 3.59 M/UL — LOW (ref 3.8–5.2)
RBC # FLD: 15.9 % — HIGH (ref 10.3–14.5)
SODIUM SERPL-SCNC: 134 MMOL/L — LOW (ref 135–145)
SPECIMEN SOURCE: SIGNIFICANT CHANGE UP
WBC # BLD: 10.56 K/UL — HIGH (ref 3.8–10.5)
WBC # FLD AUTO: 10.56 K/UL — HIGH (ref 3.8–10.5)

## 2020-05-11 PROCEDURE — 99232 SBSQ HOSP IP/OBS MODERATE 35: CPT

## 2020-05-11 PROCEDURE — 99233 SBSQ HOSP IP/OBS HIGH 50: CPT

## 2020-05-11 RX ADMIN — GABAPENTIN 300 MILLIGRAM(S): 400 CAPSULE ORAL at 21:03

## 2020-05-11 RX ADMIN — Medication 100 MILLIGRAM(S): at 21:03

## 2020-05-11 RX ADMIN — METFORMIN HYDROCHLORIDE 1000 MILLIGRAM(S): 850 TABLET ORAL at 17:19

## 2020-05-11 RX ADMIN — Medication 1 SPRAY(S): at 21:05

## 2020-05-11 RX ADMIN — AMLODIPINE BESYLATE 5 MILLIGRAM(S): 2.5 TABLET ORAL at 05:41

## 2020-05-11 RX ADMIN — LIDOCAINE 1 PATCH: 4 CREAM TOPICAL at 14:40

## 2020-05-11 RX ADMIN — Medication 2: at 11:25

## 2020-05-11 RX ADMIN — ATORVASTATIN CALCIUM 10 MILLIGRAM(S): 80 TABLET, FILM COATED ORAL at 21:03

## 2020-05-11 RX ADMIN — POLYETHYLENE GLYCOL 3350 17 GRAM(S): 17 POWDER, FOR SOLUTION ORAL at 11:26

## 2020-05-11 RX ADMIN — INSULIN GLARGINE 20 UNIT(S): 100 INJECTION, SOLUTION SUBCUTANEOUS at 21:04

## 2020-05-11 RX ADMIN — Medication 10 MILLIGRAM(S): at 11:24

## 2020-05-11 RX ADMIN — Medication 50 MILLIGRAM(S): at 05:41

## 2020-05-11 RX ADMIN — Medication 1 SPRAY(S): at 11:26

## 2020-05-11 RX ADMIN — Medication 1 APPLICATION(S): at 05:41

## 2020-05-11 RX ADMIN — Medication 25 MILLIGRAM(S): at 05:41

## 2020-05-11 RX ADMIN — LIDOCAINE 1 PATCH: 4 CREAM TOPICAL at 19:47

## 2020-05-11 RX ADMIN — Medication 3 MILLIGRAM(S): at 21:05

## 2020-05-11 RX ADMIN — Medication 100 MILLIGRAM(S): at 05:41

## 2020-05-11 RX ADMIN — Medication 1 APPLICATION(S): at 11:24

## 2020-05-11 RX ADMIN — Medication 1 SPRAY(S): at 05:42

## 2020-05-11 RX ADMIN — Medication 100 MILLIGRAM(S): at 07:45

## 2020-05-11 RX ADMIN — Medication 25 MILLIGRAM(S): at 17:19

## 2020-05-11 RX ADMIN — FAMOTIDINE 20 MILLIGRAM(S): 10 INJECTION INTRAVENOUS at 11:24

## 2020-05-11 RX ADMIN — Medication 650 MILLIGRAM(S): at 09:03

## 2020-05-11 RX ADMIN — Medication 1 APPLICATION(S): at 17:18

## 2020-05-11 RX ADMIN — Medication 1 TABLET(S): at 11:25

## 2020-05-11 RX ADMIN — LIDOCAINE 1 PATCH: 4 CREAM TOPICAL at 00:49

## 2020-05-11 RX ADMIN — Medication 100 MILLIGRAM(S): at 14:40

## 2020-05-11 RX ADMIN — Medication 2: at 07:45

## 2020-05-11 RX ADMIN — Medication 1 SPRAY(S): at 17:19

## 2020-05-11 RX ADMIN — Medication 100 MILLIGRAM(S): at 17:19

## 2020-05-11 RX ADMIN — METFORMIN HYDROCHLORIDE 1000 MILLIGRAM(S): 850 TABLET ORAL at 07:45

## 2020-05-11 NOTE — PROGRESS NOTE ADULT - NSHPATTENDINGPLANDISCUSS_GEN_ALL_CORE
Duration Of Freeze Thaw-Cycle (Seconds): 3 Detail Level: Simple Post-Care Instructions: I reviewed with the patient in detail post-care instructions. Patient is to wear sunprotection, and avoid picking at any of the treated lesions. Pt may apply Vaseline to crusted or scabbing areas. Consent: The patient's consent was obtained including but not limited to risks of crusting, scabbing, blistering, scarring, darker or lighter pigmentary change, recurrence, incomplete removal and infection. Number Of Freeze-Thaw Cycles: 1 freeze-thaw cycle Reji Smith DO; patient, OT Render Post-Care Instructions In Note?: no

## 2020-05-11 NOTE — PROGRESS NOTE ADULT - COMMENTS
Patient seen with language line  in Greenlandic, at bedside at 9:30 AM . Patient denies overnight events. Informed of findings of CT LE right and cth. Patient states pain on the RLE has imporved. patient denies HA, nausea, vomiting. denies urinary symptoms. No other complaints at this time. Patient seen with language line  in Lao, at bedside at 9:30 AM . Patient denies overnight events. Informed of findings of CT LE right and cth. Patient states pain on the RLE has imporved. patient denies HA, nausea, vomiting. denies urinary symptoms. No other complaints at this time.    Patient appears more comfortable, yes/no responses simple information appears reliable

## 2020-05-11 NOTE — PROGRESS NOTE ADULT - ASSESSMENT
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 - holding due to finding of SAH  - fall and aspiration precautions  - cont prozac for motor recovery  -right SAFO evalution with liner for support given foot drop and inversion. Discussed with orthotist, will teach patient and family to ACE wrap foot for foot drop and refer for casting as outpatient  -PRECAUTIONS: airborne, contact, aspiration, cardiac, fall  -: new Mild subarachnoid hemorrhage in the left frontal lobe stable on repeat imaging 5/10  -repeat CT head tomorrow  -can resume exercises to the RLE    #right Leg pain: knee anterior and suprapatellar effusion. RESOLVED  -currently on therapeutic lovenox  -add lidoderm patch for pain daily, ibuprofen dcd  Doppler negative DVT   -Xray right knee  negative for acute fracture  -Posterior Calf Pain  -Le dopplers negative   -CT RLE with finding of hematoma, vascular consulted would monitor for now, can continue dvt ppx given risk of covd - appears improved on exam   - ANGEL ordered - with moderate PAD    #Acute Hypoxic Respiratory Failure secondary to COVID-19 PNA  Date of COVID testin20  - Hydroxychloroquine not given, pt with prolonged QTc  - EKG - QTc: 500 (), incomplete RBBB  - Incentive spirometry, robitussin DM, ocean spray  -patient tolerating therapies without o2  -will need re-swab next week prior to dc    #UTI  -started on macrobid with bacid  -urine cultures - macrobid sensitive    #BRADY 2/2 to dehydration vs UTI  - Cr uptrending s/p 1 L overnight, bladder scan to r/o retention -negative  BMP  improved  encourage po fluids    #HTN  - Lopressor 25 q12, amlodipine 5 qd, hydralazine 50mg qd  - BP controlled     #type II Mobitz block, known RBBB  - s/p Medtronic Micra AV+ leadless pacemaker  - outpt EP followup    #T2DM.  - SSI ACHS  -diabetic educator consult appreciated :  ·	Start Metformin 1000 mg w/ breakfast and dinner. monitor for GI symptoms and attempt to wean/discontinue premeal for compliance and management at home  ·	will decrease insulin doses to avoid potential hypoglycemia.  Will adjust up or down as needed in response to metformin  ·	Decrease Glargine from 37 to 30 units -->20 units qhs   ·	DC lispro  FS wll controlled     #HLD  - cont simvastatin 20 qhs    #mood/sleep  - Prozac 10  - melatonin 3    #Pain Mgmt   - Tylenol PRN    #GI/Bowel Mgmt   - Continent  - pepcid    #/Bladder Mgmt   - Continent, PVR    #FEN   - Diet - Regular + Thins  [[facility]]  DASH/TLC      #skin:  abrasion back. bacitracin and DSD daily     #- DVT PPX  - Lovenox 70mg Q12h    #Case discussed in IDT rounds :  Patient requires set up UE dressing and set up for eating and grooming, min assist LE dressing in bed. Max assist toileting and mod-max assist shower transfers. 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 - holding due to finding of SAH  - fall and aspiration precautions  - cont prozac for motor recovery  -right SAFO evalution with liner for support given foot drop and inversion. Discussed with orthotist, will teach patient and family to ACE wrap foot for foot drop and refer for casting as outpatient  -PRECAUTIONS: airborne, contact, aspiration, cardiac, fall  -: new Mild subarachnoid hemorrhage in the left frontal lobe stable on repeat imaging 5/10  -repeat CT head tomorrow   -can resume exercises to the RLE. WBAT RLE ordered     #right Leg pain: knee anterior and suprapatellar effusion. RESOLVED  -currently on therapeutic lovenox  -add lidoderm patch for pain daily, ibuprofen dcd  Doppler negative DVT   -Xray right knee  negative for acute fracture  -Posterior Calf Pain  -Le dopplers negative   -CT RLE with finding of hematoma, vascular consulted would monitor for now, can continue dvt ppx given risk of COVID - appears improved on exam   - ANGEL performed - with +moderate PAD    #Acute Hypoxic Respiratory Failure secondary to COVID-19 PNA  Date of COVID testin20  - Hydroxychloroquine not given, pt with prolonged QTc  - EKG - QTc: 500 (), incomplete RBBB  - Incentive spirometry, robitussin DM, ocean spray  -patient tolerating therapies without o2  -will need re-swab next week prior to dc    #UTI  -started on macrobid with bacid  -urine cultures - +E coli >100k macrobid sensitive  -complete course Rx    #BRADY 2/2 to dehydration vs UTI  - Cr uptrending s/p 1 L overnight, bladder scan to r/o retention -negative  BMP  improved  encourage po fluids    #HTN  - Lopressor 25 q12, amlodipine 5 qd, hydralazine 50mg qd  - BP controlled     #type II Mobitz block, known RBBB  - s/p Medtronic Micra AV+ leadless pacemaker  - outpt EP followup    #T2DM.  - SSI ACHS  -diabetic educator consult appreciated :  ·	Start Metformin 1000 mg w/ breakfast and dinner. monitor for GI symptoms and attempt to wean/discontinue premeal for compliance and management at home  ·	will decrease insulin doses to avoid potential hypoglycemia.  Will adjust up or down as needed in response to metformin  ·	lantus 20 units qhs   ·	off lispro  FS well- controlled     #HLD  - cont simvastatin 20 qhs    #mood/sleep  - Prozac 10  - melatonin 3    #Pain Mgmt   - Tylenol PRN    #GI/Bowel Mgmt   - Continent  - pepcid    #/Bladder Mgmt   - Continent, PVR    #FEN   - Diet - Regular + Thins  [[facility]HO]  DASH/TLC      #skin:  abrasion back. bacitracin and DSD daily     #- DVT PPX  - Lovenox 70mg Q12h    #Case discussed in IDT rounds :  Patient requires set up UE dressing and set up for eating and grooming, min assist LE dressing in bed. Max assist toileting and mod-max assist shower transfers. Improvement in yes/no reliability and expression  -SAFO with liner right LE  -goas: will need diabetic education, min -mod assist transfers, mod assist for communication and all iADLs  -caregiver training  -target dc: change date to 5/15/20 with home Pt, OT, SLP and caregiver support    #LABS  Vascular neuro f/u re: DVT PPX  Head CT

## 2020-05-11 NOTE — PROGRESS NOTE ADULT - SUBJECTIVE AND OBJECTIVE BOX
Patient is a 69y old  Female who presents with a chief complaint of left frontal parietal CVA with right HP and aphasia, COVID+ 4/8/20 (10 May 2020 12:03)      HPI:  SHIREEN CASH is a 70yo female RH dominant with PMH of HTN, HLD, T2DM, hx CVA, GERD, known RBBB presented to [facility] ED on 4/8/20 with complaints of fever, chills, cough, chest pain, SOB for 5 days. Per patient, her  tested +COVID at an outpatient testing facility a few days prior, patient herself had tested negative at the time. They continued to share the same living space. ED workup showed +COVID and Mobitz type II block. EP was consulted and patient is s/p Medtronic Micra AV+ leadless pacemaker implantation via right femoral vein on 4/10/20. Hydroxychloroquine presumably not given due to prolonged QTc.     On 4/16/20, patient developed acute aphasia and right sided weakness. CT head showed an acute LEFT frontal/parietal CVA. CTA head/neck showed LEFT ICA stenosis but no occlusion. tPA was not administered due to timing and no penumbra. Placed on full dose lovenox as thoguht to be hypercoagulable secondary to COVID. Patient was cleared for regular consistency solids, thin liquids. Transferred to [place] for acute inpatient rehab services 4/23/20. (23 Apr 2020 14:26)      PAST MEDICAL & SURGICAL HISTORY:  H/O gastroesophageal reflux (GERD)  HTN (hypertension)  DM (diabetes mellitus)  History of benign brain tumor      MEDICATIONS  (STANDING):  amLODIPine   Tablet 5 milliGRAM(s) Oral daily  ammonium lactate 12% Lotion 1 Application(s) Topical two times a day  atorvastatin 10 milliGRAM(s) Oral at bedtime  BACItracin   Ointment 1 Application(s) Topical daily  benzonatate 100 milliGRAM(s) Oral every 8 hours  dextrose 5%. 1000 milliLiter(s) (50 mL/Hr) IV Continuous <Continuous>  dextrose 50% Injectable 12.5 Gram(s) IV Push once  dextrose 50% Injectable 25 Gram(s) IV Push once  dextrose 50% Injectable 25 Gram(s) IV Push once  famotidine    Tablet 20 milliGRAM(s) Oral daily  FLUoxetine 10 milliGRAM(s) Oral daily  gabapentin 300 milliGRAM(s) Oral at bedtime  hydrALAZINE 50 milliGRAM(s) Oral daily  insulin glargine Injectable (LANTUS) 20 Unit(s) SubCutaneous at bedtime  insulin lispro (HumaLOG) corrective regimen sliding scale   SubCutaneous three times a day before meals  insulin lispro (HumaLOG) corrective regimen sliding scale   SubCutaneous at bedtime  lactobacillus acidophilus 1 Tablet(s) Oral daily  lidocaine   Patch 1 Patch Transdermal daily  melatonin 3 milliGRAM(s) Oral at bedtime  metFORMIN 1000 milliGRAM(s) Oral two times a day with meals  metoprolol tartrate 25 milliGRAM(s) Oral every 12 hours  nitrofurantoin monohydrate/macrocrystals (MACROBID) 100 milliGRAM(s) Oral two times a day with meals  polyethylene glycol 3350 17 Gram(s) Oral daily  sodium chloride 0.65% Nasal 1 Spray(s) Both Nostrils four times a day  sodium chloride 0.9% Bolus 1000 milliLiter(s) IV Bolus once    MEDICATIONS  (PRN):  acetaminophen   Tablet .. 650 milliGRAM(s) Oral every 6 hours PRN Temp greater or equal to 38C (100.4F), Mild Pain (1 - 3)  dextrose 40% Gel 15 Gram(s) Oral once PRN Blood Glucose LESS THAN 70 milliGRAM(s)/deciliter  glucagon  Injectable 1 milliGRAM(s) IntraMuscular once PRN Glucose LESS THAN 70 milligrams/deciliter  lactulose Syrup 10 Gram(s) Oral daily PRN constipation      Allergies    No Known Allergies    Intolerances            PHYSICAL EXAM  69y  Vital Signs Last 24 Hrs  T(C): 36.8 (11 May 2020 08:01), Max: 36.8 (11 May 2020 08:01)  T(F): 98.2 (11 May 2020 08:01), Max: 98.2 (11 May 2020 08:01)  HR: 68 (11 May 2020 08:01) (68 - 92)  BP: 113/63 (11 May 2020 08:01) (113/63 - 142/78)  BP(mean): --  RR: 15 (11 May 2020 08:01) (15 - 17)  SpO2: 93% (11 May 2020 08:01) (93% - 99%)  Daily     Daily       RECENT LABS:                          10.2   10.56 )-----------( 411      ( 11 May 2020 08:04 )             31.6     05-11    134<L>  |  99  |  14  ----------------------------<  110<H>  4.4   |  24  |  0.77    Ca    9.6      11 May 2020 08:04    TPro  7.8  /  Alb  3.7  /  TBili  0.7  /  DBili  x   /  AST  19  /  ALT  17  /  AlkPhos  61  05-11    LIVER FUNCTIONS - ( 11 May 2020 08:04 )  Alb: 3.7 g/dL / Pro: 7.8 g/dL / ALK PHOS: 61 U/L / ALT: 17 U/L / AST: 19 U/L / GGT: x                 Urine Culture - 48 Hour Hold (collected 05-08-20 @ 09:15)  Source: .Urine  Final Report (05-11-20 @ 08:22):    >100,000 CFU/ml Escherichia coli  Organism: Escherichia coli (05-11-20 @ 08:22)  Organism: Escherichia coli (05-11-20 @ 08:22)      -  Amikacin: S <=16      -  Ampicillin: R >16 These ampicillin results predict results for amoxicillin      -  Ampicillin/Sulbactam: R >16/8 Enterobacter, Citrobacter, and Serratia may develop resistance during prolonged therapy (3-4 days)      -  Aztreonam: S <=4      -  Cefazolin: S <=8 ([email or web address]) For uncomplicated UTI with K. pneumoniae, E. coli, or P. mirablis: RENNY <=16 is sensitive and RENNY >=32 is resistant. This also predicts results for oral agents cefaclor, cefdinir, cefpodoxime, cefprozil, cefuroxime axetil, cephalexin and locarbef for uncomplicated UTI. Note that some isolates may be susceptible to these agents while testing resistant to cefazolin.      -  Cefepime: S <=4      -  Cefoxitin: S <=8      -  Ceftriaxone: S <=1 Enterobacter, Citrobacter, and Serratia may develop resistance during prolonged therapy      -  Ciprofloxacin: R >2      -  Gentamicin: S <=4      -  Imipenem: S <=1      -  Levofloxacin: R >4      -  Meropenem: S <=1      -  Nitrofurantoin: S <=32 Should not be used to treat pyelonephritis      -  Piperacillin/Tazobactam: S <=16      -  Tigecycline: S <=2      -  Tobramycin: S <=4      -  Trimethoprim/Sulfamethoxazole: R >2/38      Method Type: RENNY        CAPILLARY BLOOD GLUCOSE      POCT Blood Glucose.: 155 mg/dL (11 May 2020 07:41)  POCT Blood Glucose.: 158 mg/dL (10 May 2020 20:39)  POCT Blood Glucose.: 113 mg/dL (10 May 2020 16:11) Patient is a 69y old  Female who presents with a chief complaint of 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 Per patient, her  tested +COVID at an outpatient testing facility a few days prior, patient herself had tested negative at the time. They continued to share the same living space. ED workup showed +COVID and Mobitz type II block. EP was consulted and patient is s/p Medtronic Micra AV+ leadless pacemaker implantation via right femoral vein on 4/10/20. Hydroxychloroquine presumably not given due to prolonged QTc.     On 4/16/20, patient developed acute aphasia and right sided weakness. CT head showed an acute LEFT frontal/parietal CVA. CTA head/neck showed LEFT ICA stenosis but no occlusion. tPA was not administered due to timing and no penumbra. Placed on full dose lovenox as thoguht to be hypercoagulable secondary to COVID. Patient was cleared for regular consistency solids, thin liquids. Transferred to [place] for acute inpatient rehab services 4/23/20. (23 Apr 2020 14:26)      PAST MEDICAL & SURGICAL HISTORY:  H/O gastroesophageal reflux (GERD)  HTN (hypertension)  DM (diabetes mellitus)  History of benign brain tumor      MEDICATIONS  (STANDING):  amLODIPine   Tablet 5 milliGRAM(s) Oral daily  ammonium lactate 12% Lotion 1 Application(s) Topical two times a day  atorvastatin 10 milliGRAM(s) Oral at bedtime  BACItracin   Ointment 1 Application(s) Topical daily  benzonatate 100 milliGRAM(s) Oral every 8 hours  dextrose 5%. 1000 milliLiter(s) (50 mL/Hr) IV Continuous <Continuous>  dextrose 50% Injectable 12.5 Gram(s) IV Push once  dextrose 50% Injectable 25 Gram(s) IV Push once  dextrose 50% Injectable 25 Gram(s) IV Push once  famotidine    Tablet 20 milliGRAM(s) Oral daily  FLUoxetine 10 milliGRAM(s) Oral daily  gabapentin 300 milliGRAM(s) Oral at bedtime  hydrALAZINE 50 milliGRAM(s) Oral daily  insulin glargine Injectable (LANTUS) 20 Unit(s) SubCutaneous at bedtime  insulin lispro (HumaLOG) corrective regimen sliding scale   SubCutaneous three times a day before meals  insulin lispro (HumaLOG) corrective regimen sliding scale   SubCutaneous at bedtime  lactobacillus acidophilus 1 Tablet(s) Oral daily  lidocaine   Patch 1 Patch Transdermal daily  melatonin 3 milliGRAM(s) Oral at bedtime  metFORMIN 1000 milliGRAM(s) Oral two times a day with meals  metoprolol tartrate 25 milliGRAM(s) Oral every 12 hours  nitrofurantoin monohydrate/macrocrystals (MACROBID) 100 milliGRAM(s) Oral two times a day with meals  polyethylene glycol 3350 17 Gram(s) Oral daily  sodium chloride 0.65% Nasal 1 Spray(s) Both Nostrils four times a day  sodium chloride 0.9% Bolus 1000 milliLiter(s) IV Bolus once    MEDICATIONS  (PRN):  acetaminophen   Tablet .. 650 milliGRAM(s) Oral every 6 hours PRN Temp greater or equal to 38C (100.4F), Mild Pain (1 - 3)  dextrose 40% Gel 15 Gram(s) Oral once PRN Blood Glucose LESS THAN 70 milliGRAM(s)/deciliter  glucagon  Injectable 1 milliGRAM(s) IntraMuscular once PRN Glucose LESS THAN 70 milligrams/deciliter  lactulose Syrup 10 Gram(s) Oral daily PRN constipation      Allergies    No Known Allergies    Intolerances            PHYSICAL EXAM  69y  Vital Signs Last 24 Hrs  T(C): 36.8 (11 May 2020 08:01), Max: 36.8 (11 May 2020 08:01)  T(F): 98.2 (11 May 2020 08:01), Max: 98.2 (11 May 2020 08:01)  HR: 68 (11 May 2020 08:01) (68 - 92)  BP: 113/63 (11 May 2020 08:01) (113/63 - 142/78)  BP(mean): --  RR: 15 (11 May 2020 08:01) (15 - 17)  SpO2: 93% (11 May 2020 08:01) (93% - 99%)  Daily     Daily       RECENT LABS:                          10.2   10.56 )-----------( 411      ( 11 May 2020 08:04 )             31.6     05-11    134<L>  |  99  |  14  ----------------------------<  110<H>  4.4   |  24  |  0.77    Ca    9.6      11 May 2020 08:04    TPro  7.8  /  Alb  3.7  /  TBili  0.7  /  DBili  x   /  AST  19  /  ALT  17  /  AlkPhos  61  05-11    LIVER FUNCTIONS - ( 11 May 2020 08:04 )  Alb: 3.7 g/dL / Pro: 7.8 g/dL / ALK PHOS: 61 U/L / ALT: 17 U/L / AST: 19 U/L / GGT: x                 Urine Culture - 48 Hour Hold (collected 05-08-20 @ 09:15)  Source: .Urine  Final Report (05-11-20 @ 08:22):    >100,000 CFU/ml Escherichia coli  Organism: Escherichia coli (05-11-20 @ 08:22)  Organism: Escherichia coli (05-11-20 @ 08:22)      -  Amikacin: S <=16      -  Ampicillin: R >16 These ampicillin results predict results for amoxicillin      -  Ampicillin/Sulbactam: R >16/8 Enterobacter, Citrobacter, and Serratia may develop resistance during prolonged therapy (3-4 days)      -  Aztreonam: S <=4      -  Cefazolin: S <=8 ([email or web address]) For uncomplicated UTI with K. pneumoniae, E. coli, or P. mirablis: RENNY <=16 is sensitive and RENNY >=32 is resistant. This also predicts results for oral agents cefaclor, cefdinir, cefpodoxime, cefprozil, cefuroxime axetil, cephalexin and locarbef for uncomplicated UTI. Note that some isolates may be susceptible to these agents while testing resistant to cefazolin.      -  Cefepime: S <=4      -  Cefoxitin: S <=8      -  Ceftriaxone: S <=1 Enterobacter, Citrobacter, and Serratia may develop resistance during prolonged therapy      -  Ciprofloxacin: R >2      -  Gentamicin: S <=4      -  Imipenem: S <=1      -  Levofloxacin: R >4      -  Meropenem: S <=1      -  Nitrofurantoin: S <=32 Should not be used to treat pyelonephritis      -  Piperacillin/Tazobactam: S <=16      -  Tigecycline: S <=2      -  Tobramycin: S <=4      -  Trimethoprim/Sulfamethoxazole: R >2/38      Method Type: RENNY        CAPILLARY BLOOD GLUCOSE      POCT Blood Glucose.: 155 mg/dL (11 May 2020 07:41)  POCT Blood Glucose.: 158 mg/dL (10 May 2020 20:39)  POCT Blood Glucose.: 113 mg/dL (10 May 2020 16:11)

## 2020-05-11 NOTE — PROGRESS NOTE ADULT - SUBJECTIVE AND OBJECTIVE BOX
working w OT at bedside         no co   RLE brace   ROS all others are neg     Vital Signs Last 24 Hrs    T(C): 36.8 (11 May 2020 08:01), Max: 36.8 (11 May 2020 08:01)  T(F): 98.2 (11 May 2020 08:01), Max: 98.2 (11 May 2020 08:01)  HR: 68 (11 May 2020 08:01) (68 - 92)  BP: 113/63 (11 May 2020 08:01) (113/63 - 142/78)  BP(mean): --  RR: 15 (11 May 2020 08:01) (15 - 17)  SpO2: 93% (11 May 2020 08:01) (93% - 99%)    MEDICATIONS  (STANDING):    amLODIPine   Tablet 5 milliGRAM(s) Oral daily  ammonium lactate 12% Lotion 1 Application(s) Topical two times a day  atorvastatin 10 milliGRAM(s) Oral at bedtime  BACItracin   Ointment 1 Application(s) Topical daily  benzonatate 100 milliGRAM(s) Oral every 8 hours  dextrose 5%. 1000 milliLiter(s) (50 mL/Hr) IV Continuous <Continuous>  dextrose 50% Injectable 12.5 Gram(s) IV Push once  dextrose 50% Injectable 25 Gram(s) IV Push once  dextrose 50% Injectable 25 Gram(s) IV Push once  famotidine    Tablet 20 milliGRAM(s) Oral daily  FLUoxetine 10 milliGRAM(s) Oral daily  gabapentin 300 milliGRAM(s) Oral at bedtime  hydrALAZINE 50 milliGRAM(s) Oral daily  insulin glargine Injectable (LANTUS) 20 Unit(s) SubCutaneous at bedtime  insulin lispro (HumaLOG) corrective regimen sliding scale   SubCutaneous three times a day before meals  insulin lispro (HumaLOG) corrective regimen sliding scale   SubCutaneous at bedtime  lactobacillus acidophilus 1 Tablet(s) Oral daily  lidocaine   Patch 1 Patch Transdermal daily  melatonin 3 milliGRAM(s) Oral at bedtime  metFORMIN 1000 milliGRAM(s) Oral two times a day with meals  metoprolol tartrate 25 milliGRAM(s) Oral every 12 hours  nitrofurantoin monohydrate/macrocrystals (MACROBID) 100 milliGRAM(s) Oral two times a day with meals  polyethylene glycol 3350 17 Gram(s) Oral daily  sodium chloride 0.65% Nasal 1 Spray(s) Both Nostrils four times a day  sodium chloride 0.9% Bolus 1000 milliLiter(s) IV Bolus once                          10.2   10.56 )-----------( 411      ( 11 May 2020 08:04 )             31.6   05-11    134<L>  |  99  |  14  ----------------------------<  110<H>  4.4   |  24  |  0.77    Ca    9.6      11 May 2020 08:04    TPro  7.8  /  Alb  3.7  /  TBili  0.7  /  DBili  x   /  AST  19  /  ALT  17  /  AlkPhos  61  05-11

## 2020-05-11 NOTE — PROGRESS NOTE ADULT - EXTREMITIES COMMENTS
rle bruising/ecchymosis on lateral aspect of knee improved. Tenderness to palpation but improved from prior. Soft. warm. rle bruising/ecchymosis on lateral aspect of knee improved. still large in area, taking up 2/3 of lateral right leg but now with yellowing, reduced TTP Tenderness to palpation but improved from prior. Soft. warm.

## 2020-05-12 PROCEDURE — 70450 CT HEAD/BRAIN W/O DYE: CPT | Mod: 26

## 2020-05-12 PROCEDURE — 99233 SBSQ HOSP IP/OBS HIGH 50: CPT

## 2020-05-12 PROCEDURE — 73610 X-RAY EXAM OF ANKLE: CPT | Mod: 26,RT

## 2020-05-12 RX ADMIN — METFORMIN HYDROCHLORIDE 1000 MILLIGRAM(S): 850 TABLET ORAL at 08:06

## 2020-05-12 RX ADMIN — LIDOCAINE 1 PATCH: 4 CREAM TOPICAL at 22:44

## 2020-05-12 RX ADMIN — FAMOTIDINE 20 MILLIGRAM(S): 10 INJECTION INTRAVENOUS at 12:17

## 2020-05-12 RX ADMIN — POLYETHYLENE GLYCOL 3350 17 GRAM(S): 17 POWDER, FOR SOLUTION ORAL at 12:17

## 2020-05-12 RX ADMIN — Medication 100 MILLIGRAM(S): at 06:18

## 2020-05-12 RX ADMIN — Medication 25 MILLIGRAM(S): at 18:36

## 2020-05-12 RX ADMIN — Medication 2: at 12:03

## 2020-05-12 RX ADMIN — Medication 1 APPLICATION(S): at 12:18

## 2020-05-12 RX ADMIN — Medication 100 MILLIGRAM(S): at 08:06

## 2020-05-12 RX ADMIN — Medication 1 SPRAY(S): at 12:17

## 2020-05-12 RX ADMIN — Medication 25 MILLIGRAM(S): at 06:19

## 2020-05-12 RX ADMIN — Medication 100 MILLIGRAM(S): at 18:36

## 2020-05-12 RX ADMIN — METFORMIN HYDROCHLORIDE 1000 MILLIGRAM(S): 850 TABLET ORAL at 18:36

## 2020-05-12 RX ADMIN — Medication 10 MILLIGRAM(S): at 12:18

## 2020-05-12 RX ADMIN — Medication 1 SPRAY(S): at 06:19

## 2020-05-12 RX ADMIN — Medication 50 MILLIGRAM(S): at 06:19

## 2020-05-12 RX ADMIN — Medication 650 MILLIGRAM(S): at 16:42

## 2020-05-12 RX ADMIN — LIDOCAINE 1 PATCH: 4 CREAM TOPICAL at 02:48

## 2020-05-12 RX ADMIN — ATORVASTATIN CALCIUM 10 MILLIGRAM(S): 80 TABLET, FILM COATED ORAL at 21:50

## 2020-05-12 RX ADMIN — Medication 3 MILLIGRAM(S): at 21:50

## 2020-05-12 RX ADMIN — LIDOCAINE 1 PATCH: 4 CREAM TOPICAL at 12:17

## 2020-05-12 RX ADMIN — Medication 1 SPRAY(S): at 18:36

## 2020-05-12 RX ADMIN — Medication 100 MILLIGRAM(S): at 14:38

## 2020-05-12 RX ADMIN — AMLODIPINE BESYLATE 5 MILLIGRAM(S): 2.5 TABLET ORAL at 06:18

## 2020-05-12 RX ADMIN — Medication 1 TABLET(S): at 12:17

## 2020-05-12 RX ADMIN — Medication 650 MILLIGRAM(S): at 10:26

## 2020-05-12 RX ADMIN — Medication 1 APPLICATION(S): at 18:35

## 2020-05-12 RX ADMIN — INSULIN GLARGINE 20 UNIT(S): 100 INJECTION, SOLUTION SUBCUTANEOUS at 21:50

## 2020-05-12 RX ADMIN — Medication 1 APPLICATION(S): at 06:18

## 2020-05-12 RX ADMIN — GABAPENTIN 300 MILLIGRAM(S): 400 CAPSULE ORAL at 21:50

## 2020-05-12 NOTE — PROGRESS NOTE ADULT - ASSESSMENT
70 yo F     ·	Headache SAH clinically, serial CT head x 2 x 24 hrs stable across time (reviewed w neuro team), off lovenox, HA resolved neuro team assist appreciated, hold ac     ·	COVID-19 Acute Hypoxic Respiratory Failure secondary to COVID-19 PNA:  resolved   ·	Left frontal and parietal CVA with residual right HP, foot drop, patricio-inattention aphasia      ·	RLE hematoma presumably post traumatic from wheelchair leg rest ? vs spontaneous (pain improving)    vascular surgery not  likely compartment syndrome   off lovenox   monitor hh trend/time   transfuse for hb < 7.5   monitor closely     Chronic medical conditions     1.	Essential HTN  2.	HLD  3.	DM II   4.	HPL  5.	hx CVA  6.	GERD  7.	Type II Mobitz block, known RBBB  8.	s/p Medtronic Micra AV+ leadless pacemaker    Reviewed w patient plan of care   Reviewed w rehab team plan of care   Full code

## 2020-05-12 NOTE — PROGRESS NOTE ADULT - SUBJECTIVE AND OBJECTIVE BOX
Patient is a 69y old  Female who presents with a chief complaint of left frontal parietal CVA with right HP and aphasia, COVID+ 4/8/20 fu (11 May 2020 12:23)      HPI:  SHIREEN CASH is a 68yo female RH dominant with PMH of HTN, HLD, T2DM, hx CVA, GERD, known RBBB presented to [facility] ED on 4/8/20 with complaints of fever, chills, cough, chest pain, SOB for 5 days. Per patient, her  tested +COVID at an outpatient testing facility a few days prior, patient herself had tested negative at the time. They continued to share the same living space. ED workup showed +COVID and Mobitz type II block. EP was consulted and patient is s/p Medtronic Micra AV+ leadless pacemaker implantation via right femoral vein on 4/10/20. Hydroxychloroquine presumably not given due to prolonged QTc.     On 4/16/20, patient developed acute aphasia and right sided weakness. CT head showed an acute LEFT frontal/parietal CVA. CTA head/neck showed LEFT ICA stenosis but no occlusion. tPA was not administered due to timing and no penumbra. Placed on full dose lovenox as thoguht to be hypercoagulable secondary to COVID. Patient was cleared for regular consistency solids, thin liquids. Transferred to [place] for acute inpatient rehab services 4/23/20. (23 Apr 2020 14:26)      PAST MEDICAL & SURGICAL HISTORY:  H/O gastroesophageal reflux (GERD)  HTN (hypertension)  DM (diabetes mellitus)  History of benign brain tumor      MEDICATIONS  (STANDING):  amLODIPine   Tablet 5 milliGRAM(s) Oral daily  ammonium lactate 12% Lotion 1 Application(s) Topical two times a day  atorvastatin 10 milliGRAM(s) Oral at bedtime  BACItracin   Ointment 1 Application(s) Topical daily  benzonatate 100 milliGRAM(s) Oral every 8 hours  dextrose 5%. 1000 milliLiter(s) (50 mL/Hr) IV Continuous <Continuous>  dextrose 50% Injectable 12.5 Gram(s) IV Push once  dextrose 50% Injectable 25 Gram(s) IV Push once  dextrose 50% Injectable 25 Gram(s) IV Push once  famotidine    Tablet 20 milliGRAM(s) Oral daily  FLUoxetine 10 milliGRAM(s) Oral daily  gabapentin 300 milliGRAM(s) Oral at bedtime  hydrALAZINE 50 milliGRAM(s) Oral daily  insulin glargine Injectable (LANTUS) 20 Unit(s) SubCutaneous at bedtime  insulin lispro (HumaLOG) corrective regimen sliding scale   SubCutaneous three times a day before meals  insulin lispro (HumaLOG) corrective regimen sliding scale   SubCutaneous at bedtime  lactobacillus acidophilus 1 Tablet(s) Oral daily  lidocaine   Patch 1 Patch Transdermal daily  melatonin 3 milliGRAM(s) Oral at bedtime  metFORMIN 1000 milliGRAM(s) Oral two times a day with meals  metoprolol tartrate 25 milliGRAM(s) Oral every 12 hours  nitrofurantoin monohydrate/macrocrystals (MACROBID) 100 milliGRAM(s) Oral two times a day with meals  polyethylene glycol 3350 17 Gram(s) Oral daily  sodium chloride 0.65% Nasal 1 Spray(s) Both Nostrils four times a day  sodium chloride 0.9% Bolus 1000 milliLiter(s) IV Bolus once    MEDICATIONS  (PRN):  acetaminophen   Tablet .. 650 milliGRAM(s) Oral every 6 hours PRN Temp greater or equal to 38C (100.4F), Mild Pain (1 - 3)  dextrose 40% Gel 15 Gram(s) Oral once PRN Blood Glucose LESS THAN 70 milliGRAM(s)/deciliter  glucagon  Injectable 1 milliGRAM(s) IntraMuscular once PRN Glucose LESS THAN 70 milligrams/deciliter  lactulose Syrup 10 Gram(s) Oral daily PRN constipation      Allergies    No Known Allergies    Intolerances            PHYSICAL EXAM  69y  Vital Signs Last 24 Hrs  T(C): 36.8 (12 May 2020 08:23), Max: 36.9 (11 May 2020 21:00)  T(F): 98.3 (12 May 2020 08:23), Max: 98.5 (11 May 2020 21:00)  HR: 65 (12 May 2020 08:23) (65 - 83)  BP: 116/65 (12 May 2020 08:23) (116/65 - 147/68)  BP(mean): --  RR: 15 (12 May 2020 08:23) (15 - 16)  SpO2: 95% (12 May 2020 08:23) (95% - 96%)  Daily     Daily       RECENT LABS:                          10.2   10.56 )-----------( 411      ( 11 May 2020 08:04 )             31.6     05-11    134<L>  |  99  |  14  ----------------------------<  110<H>  4.4   |  24  |  0.77    Ca    9.6      11 May 2020 08:04    TPro  7.8  /  Alb  3.7  /  TBili  0.7  /  DBili  x   /  AST  19  /  ALT  17  /  AlkPhos  61  05-11    LIVER FUNCTIONS - ( 11 May 2020 08:04 )  Alb: 3.7 g/dL / Pro: 7.8 g/dL / ALK PHOS: 61 U/L / ALT: 17 U/L / AST: 19 U/L / GGT: x                 Urine Culture - 48 Hour Hold (collected 05-08-20 @ 09:15)  Source: .Urine  Final Report (05-11-20 @ 08:22):    >100,000 CFU/ml Escherichia coli  Organism: Escherichia coli (05-11-20 @ 08:22)  Organism: Escherichia coli (05-11-20 @ 08:22)      -  Amikacin: S <=16      -  Ampicillin: R >16 These ampicillin results predict results for amoxicillin      -  Ampicillin/Sulbactam: R >16/8 Enterobacter, Citrobacter, and Serratia may develop resistance during prolonged therapy (3-4 days)      -  Aztreonam: S <=4      -  Cefazolin: S <=8 ([email or web address]) For uncomplicated UTI with K. pneumoniae, E. coli, or P. mirablis: RENNY <=16 is sensitive and RENNY >=32 is resistant. This also predicts results for oral agents cefaclor, cefdinir, cefpodoxime, cefprozil, cefuroxime axetil, cephalexin and locarbef for uncomplicated UTI. Note that some isolates may be susceptible to these agents while testing resistant to cefazolin.      -  Cefepime: S <=4      -  Cefoxitin: S <=8      -  Ceftriaxone: S <=1 Enterobacter, Citrobacter, and Serratia may develop resistance during prolonged therapy      -  Ciprofloxacin: R >2      -  Gentamicin: S <=4      -  Imipenem: S <=1      -  Levofloxacin: R >4      -  Meropenem: S <=1      -  Nitrofurantoin: S <=32 Should not be used to treat pyelonephritis      -  Piperacillin/Tazobactam: S <=16      -  Tigecycline: S <=2      -  Tobramycin: S <=4      -  Trimethoprim/Sulfamethoxazole: R >2/38      Method Type: RENNY        CAPILLARY BLOOD GLUCOSE      POCT Blood Glucose.: 148 mg/dL (12 May 2020 07:09)  POCT Blood Glucose.: 138 mg/dL (11 May 2020 20:44)  POCT Blood Glucose.: 114 mg/dL (11 May 2020 16:59)  POCT Blood Glucose.: 152 mg/dL (11 May 2020 11:16) Patient is a 69y old  Female who presents with a chief complaint of left frontal parietal CVA with right HP and aphasia, COVID+ 4/8/20 fu (11 May 2020 12:23)      HPI:  SHIREEN CASH is a 68yo female RH dominant with PMH of HTN, HLD, T2DM, hx CVA, GERD, known RBBB presented to [facility] ED on 4/8/20 with complaints of fever, chills, cough, chest pain, SOB for 5 days. Per patient, her  tested +COVID at an outpatient testing facility a few days prior, patient herself had tested negative at the time. They continued to share the same living space. ED workup showed +COVID and Mobitz type II block. EP was consulted and patient is s/p Medtronic Micra AV+ leadless pacemaker implantation via right femoral vein on 4/10/20. Hydroxychloroquine presumably not given due to prolonged QTc.     On 4/16/20, patient developed acute aphasia and right sided weakness. CT head showed an acute LEFT frontal/parietal CVA. CTA head/neck showed LEFT ICA stenosis but no occlusion. tPA was not administered due to timing and no penumbra. Placed on full dose lovenox as thoguht to be hypercoagulable secondary to COVID. Patient was cleared for regular consistency solids, thin liquids. Transferred to [place] for acute inpatient rehab services 4/23/20. (23 Apr 2020 14:26)      PAST MEDICAL & SURGICAL HISTORY:  H/O gastroesophageal reflux (GERD)  HTN (hypertension)  DM (diabetes mellitus)  History of benign brain tumor      MEDICATIONS  (STANDING):  amLODIPine   Tablet 5 milliGRAM(s) Oral daily  ammonium lactate 12% Lotion 1 Application(s) Topical two times a day  atorvastatin 10 milliGRAM(s) Oral at bedtime  BACItracin   Ointment 1 Application(s) Topical daily  benzonatate 100 milliGRAM(s) Oral every 8 hours  dextrose 5%. 1000 milliLiter(s) (50 mL/Hr) IV Continuous <Continuous>  dextrose 50% Injectable 12.5 Gram(s) IV Push once  dextrose 50% Injectable 25 Gram(s) IV Push once  dextrose 50% Injectable 25 Gram(s) IV Push once  famotidine    Tablet 20 milliGRAM(s) Oral daily  FLUoxetine 10 milliGRAM(s) Oral daily  gabapentin 300 milliGRAM(s) Oral at bedtime  hydrALAZINE 50 milliGRAM(s) Oral daily  insulin glargine Injectable (LANTUS) 20 Unit(s) SubCutaneous at bedtime  insulin lispro (HumaLOG) corrective regimen sliding scale   SubCutaneous three times a day before meals  insulin lispro (HumaLOG) corrective regimen sliding scale   SubCutaneous at bedtime  lactobacillus acidophilus 1 Tablet(s) Oral daily  lidocaine   Patch 1 Patch Transdermal daily  melatonin 3 milliGRAM(s) Oral at bedtime  metFORMIN 1000 milliGRAM(s) Oral two times a day with meals  metoprolol tartrate 25 milliGRAM(s) Oral every 12 hours  nitrofurantoin monohydrate/macrocrystals (MACROBID) 100 milliGRAM(s) Oral two times a day with meals  polyethylene glycol 3350 17 Gram(s) Oral daily  sodium chloride 0.65% Nasal 1 Spray(s) Both Nostrils four times a day  sodium chloride 0.9% Bolus 1000 milliLiter(s) IV Bolus once    MEDICATIONS  (PRN):  acetaminophen   Tablet .. 650 milliGRAM(s) Oral every 6 hours PRN Temp greater or equal to 38C (100.4F), Mild Pain (1 - 3)  dextrose 40% Gel 15 Gram(s) Oral once PRN Blood Glucose LESS THAN 70 milliGRAM(s)/deciliter  glucagon  Injectable 1 milliGRAM(s) IntraMuscular once PRN Glucose LESS THAN 70 milligrams/deciliter  lactulose Syrup 10 Gram(s) Oral daily PRN constipation      Allergies    No Known Allergies    Intolerances            PHYSICAL EXAM  69y  Vital Signs Last 24 Hrs  T(C): 36.8 (12 May 2020 08:23), Max: 36.9 (11 May 2020 21:00)  T(F): 98.3 (12 May 2020 08:23), Max: 98.5 (11 May 2020 21:00)  HR: 65 (12 May 2020 08:23) (65 - 83)  BP: 116/65 (12 May 2020 08:23) (116/65 - 147/68)  BP(mean): --  RR: 15 (12 May 2020 08:23) (15 - 16)  SpO2: 95% (12 May 2020 08:23) (95% - 96%)  Daily     Daily       RECENT LABS:                          10.2   10.56 )-----------( 411      ( 11 May 2020 08:04 )             31.6     05-11    134<L>  |  99  |  14  ----------------------------<  110<H>  4.4   |  24  |  0.77    Ca    9.6      11 May 2020 08:04    TPro  7.8  /  Alb  3.7  /  TBili  0.7  /  DBili  x   /  AST  19  /  ALT  17  /  AlkPhos  61  05-11    LIVER FUNCTIONS - ( 11 May 2020 08:04 )  Alb: 3.7 g/dL / Pro: 7.8 g/dL / ALK PHOS: 61 U/L / ALT: 17 U/L / AST: 19 U/L / GGT: x                 Urine Culture - 48 Hour Hold (collected 05-08-20 @ 09:15)  Source: .Urine  Final Report (05-11-20 @ 08:22):    >100,000 CFU/ml Escherichia coli  Organism: Escherichia coli (05-11-20 @ 08:22)  Organism: Escherichia coli (05-11-20 @ 08:22)      -  Amikacin: S <=16      -  Ampicillin: R >16 These ampicillin results predict results for amoxicillin      -  Ampicillin/Sulbactam: R >16/8 Enterobacter, Citrobacter, and Serratia may develop resistance during prolonged therapy (3-4 days)      -  Aztreonam: S <=4      -  Cefazolin: S <=8 ([email or web address]) For uncomplicated UTI with K. pneumoniae, E. coli, or P. mirablis: RENNY <=16 is sensitive and RENNY >=32 is resistant. This also predicts results for oral agents cefaclor, cefdinir, cefpodoxime, cefprozil, cefuroxime axetil, cephalexin and locarbef for uncomplicated UTI. Note that some isolates may be susceptible to these agents while testing resistant to cefazolin.      -  Cefepime: S <=4      -  Cefoxitin: S <=8      -  Ceftriaxone: S <=1 Enterobacter, Citrobacter, and Serratia may develop resistance during prolonged therapy      -  Ciprofloxacin: R >2      -  Gentamicin: S <=4      -  Imipenem: S <=1      -  Levofloxacin: R >4      -  Meropenem: S <=1      -  Nitrofurantoin: S <=32 Should not be used to treat pyelonephritis      -  Piperacillin/Tazobactam: S <=16      -  Tigecycline: S <=2      -  Tobramycin: S <=4      -  Trimethoprim/Sulfamethoxazole: R >2/38      Method Type: RENNY        CAPILLARY BLOOD GLUCOSE      POCT Blood Glucose.: 148 mg/dL (12 May 2020 07:09)  POCT Blood Glucose.: 138 mg/dL (11 May 2020 20:44)  POCT Blood Glucose.: 114 mg/dL (11 May 2020 16:59)  POCT Blood Glucose.: 152 mg/dL (11 May 2020 11:16)        EXAM:  CT BRAIN      PROCEDURE DATE:  05/12/2020        INTERPRETATION:  INDICATION:  Follow-up. History of subarachnoid hemorrhage.  TECHNIQUE:  A non contrast 2.5mm axial CT study of the brain was performed from skull base to vertex. Coronal and sagittal reformations were generated from the axial data.  COMPARISON EXAMINATION:  CT 5/10/2020    FINDINGS:      As compared to prior study, there is no significant interval change. Again there are ischemic changes in the left hemisphere with a now mature appearance of the left the MCA and OLU infarcts are present on 4/16/2020. Again noted is low-level blood in the left the anterior paracentral region. There is no evidence of rebleeding or new hemorrhage.    Also noted are involutional changes in both hemispheres with volume loss.    Ventricles are midline    Chronic ischemic changes are again noted in the cerebellum.    A left temporal fossa arachnoid cyst is again noted.    IMPRESSION:    1)  stable follow-up study with no significant change.  2)  follow-up MR imaging recommended for further assessment.        MICAELA BURGESS M.D., ATTENDING RADIOLOGIST  This document has been electronically signed. May 12 2020 11:03AM

## 2020-05-12 NOTE — PROGRESS NOTE ADULT - COMMENTS
Patient seen with language line  in [place], at bedside at 8:30 AM . Patient state that her right medial ankle hurts her since transfer yesterday with ? injury.  Denies urinary symptoms. Posterior calf pain improving. Continues to deny HA, nausea.     Patient appears more comfortable, yes/no responses simple information appears reliable Patient seen with language line  in [place]ic, at bedside at 8:30 AM . Patient state that her right medial ankle hurts her since transfer yesterday to wheelchair yesterday, denies any fall although expresses discomfort during transfer process.    Denies urinary symptoms. No dysuria, no hesitancy. Posterior calf pain improving. Continues to deny HA, nausea.     Patient appears more comfortable, yes/no responses simple information appears reliable

## 2020-05-12 NOTE — PROGRESS NOTE ADULT - EXTREMITIES COMMENTS
mild edema in the right ankle with medial and lateral laxity. Bruising/ecchymosis posterior to medial malleolus. tender to palpation.  Right posterior calf bruising/ecchymosis. Less tender to palpation mild edema in the right ankle with medial and lateral laxity. Bruising/ecchymosis posterior to medial malleolus. tender to palpation.  Right posterior calf bruising/ecchymosis. Less tender to palpation  no significant swelling or warmth, no erythema +wiggle toes occ

## 2020-05-12 NOTE — PROGRESS NOTE ADULT - SUBJECTIVE AND OBJECTIVE BOX
smiling   exercising   the hand; opening, closing ...          ROS all others are neg     no fc     MEDICATIONS  (STANDING):    amLODIPine   Tablet 5 milliGRAM(s) Oral daily  ammonium lactate 12% Lotion 1 Application(s) Topical two times a day  atorvastatin 10 milliGRAM(s) Oral at bedtime  BACItracin   Ointment 1 Application(s) Topical daily  benzonatate 100 milliGRAM(s) Oral every 8 hours  dextrose 5%. 1000 milliLiter(s) (50 mL/Hr) IV Continuous <Continuous>  dextrose 50% Injectable 12.5 Gram(s) IV Push once  dextrose 50% Injectable 25 Gram(s) IV Push once  dextrose 50% Injectable 25 Gram(s) IV Push once  famotidine    Tablet 20 milliGRAM(s) Oral daily  FLUoxetine 10 milliGRAM(s) Oral daily  gabapentin 300 milliGRAM(s) Oral at bedtime  hydrALAZINE 50 milliGRAM(s) Oral daily  insulin glargine Injectable (LANTUS) 20 Unit(s) SubCutaneous at bedtime  insulin lispro (HumaLOG) corrective regimen sliding scale   SubCutaneous three times a day before meals  insulin lispro (HumaLOG) corrective regimen sliding scale   SubCutaneous at bedtime  lactobacillus acidophilus 1 Tablet(s) Oral daily  lidocaine   Patch 1 Patch Transdermal daily  melatonin 3 milliGRAM(s) Oral at bedtime  metFORMIN 1000 milliGRAM(s) Oral two times a day with meals  metoprolol tartrate 25 milliGRAM(s) Oral every 12 hours  nitrofurantoin monohydrate/macrocrystals (MACROBID) 100 milliGRAM(s) Oral two times a day with meals  polyethylene glycol 3350 17 Gram(s) Oral daily  sodium chloride 0.65% Nasal 1 Spray(s) Both Nostrils four times a day  sodium chloride 0.9% Bolus 1000 milliLiter(s) IV Bolus once                          10.2   10.56 )-----------( 411      ( 11 May 2020 08:04 )             31.6     05-11    134<L>  |  99  |  14  ----------------------------<  110<H>  4.4   |  24  |  0.77    Ca    9.6      11 May 2020 08:04    TPro  7.8  /  Alb  3.7  /  TBili  0.7  /  DBili  x   /  AST  19  /  ALT  17  /  AlkPhos  61  05-11

## 2020-05-12 NOTE — PHARMACOTHERAPY INTERVENTION NOTE - COMMENTS
Upon chart review, noted pt has been receiving benzonatate every 8 hours since 4/23. Spoke with resident to clarify if pt still requires it around the clock. Per discussion, will adjust order to PRN cough.

## 2020-05-12 NOTE — PROGRESS NOTE ADULT - ASSESSMENT
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 - holding due to finding of SAH  - fall and aspiration precautions  - cont prozac for motor recovery  -right SAFO evalution with liner for support given foot drop and inversion. Discussed with orthotist, will teach patient and family to ACE wrap foot for foot drop and refer for casting as outpatient  -PRECAUTIONS: airborne, contact, aspiration, cardiac, fall  -: new Mild subarachnoid hemorrhage in the left frontal lobe stable on repeat imaging 5/10  -repeat CT head today  - as per neuro, no neurological contraindication to resume DVT ppx if improving.     #right Leg pain: knee anterior and suprapatellar effusion. RESOLVED  -add lidoderm patch for pain daily, ibuprofen dcd  - Doppler negative DVT   -Xray right knee  negative for acute fracture  -Posterior Calf Pain  - Le dopplers negative  -  CT RLE with finding of hematoma, vascular consulted would monitor for now, can continue dvt ppx given risk of COVID - appears improved on exam   - LE ANGEL performed - with +moderate PAD  - prafo to the RLE  -pt inr  for episodes of bruising    #Acute Hypoxic Respiratory Failure secondary to COVID-19 PNA  Date of COVID testin20  - Hydroxychloroquine not given, pt with prolonged QTc  - EKG - QTc: 500 (), incomplete RBBB  - Incentive spirometry, robitussin DM, ocean spray  -patient tolerating therapies without o2  -will need re-swab next week prior to dc    #UTI  -started on macrobid with bacid until 5/15  -urine cultures - +E coli >100k macrobid sensitive  -complete course Rx    #BRADY 2/2 to dehydration vs UTI  - Cr uptrending s/p 1 L overnight, bladder scan to r/o retention -negative  BMP  improved  encourage po fluids    #HTN  - Lopressor 25 q12, amlodipine 5 qd, hydralazine 50mg qd  - BP controlled     #type II Mobitz block, known RBBB  - s/p Medtronic Micra AV+ leadless pacemaker  - outpt EP followup    #T2DM.  - SSI ACHS  -diabetic educator consult appreciated :  ·	Start Metformin 1000 mg w/ breakfast and dinner. monitor for GI symptoms and attempt to wean/discontinue premeal for compliance and management at home  ·	will decrease insulin doses to avoid potential hypoglycemia.  Will adjust up or down as needed in response to metformin  ·	lantus 20 units qhs   ·	off lispro  FS well- controlled     #HLD  - cont simvastatin 20 qhs    #mood/sleep  - Prozac 10  - melatonin 3    #Pain Mgmt   - Tylenol PRN    #GI/Bowel Mgmt   - Continent  - pepcid    #/Bladder Mgmt   - Continent, PVR negative    #FEN   - Diet - Regular + Thins  [CCHO]  DASH/TLC      #skin:  abrasion back. bacitracin and DSD daily     #- DVT PPX  - Lovenox 70mg Q12h    #Case discussed in IDT rounds :  Patient requires set up UE dressing and set up for eating and grooming, min assist LE dressing in bed. Max assist toileting and mod-max assist shower transfers. 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 - holding due to finding of SAH  - fall and aspiration precautions  - cont prozac for motor recovery  -right SAFO evalution with liner for support given foot drop and inversion. Discussed with orthotist, will teach patient and family to ACE wrap foot for foot drop and refer for casting as outpatient  -PRECAUTIONS: airborne, contact, aspiration, cardiac, fall  -: new Mild subarachnoid hemorrhage in the left frontal lobe stable on repeat imaging 5/10  -repeat CT head today  - as per neuro, no neurological contraindication to resume DVT ppx if improving.     #right Leg pain: knee anterior and suprapatellar effusion. RESOLVED  -add lidoderm patch for pain daily, ibuprofen dcd  - Doppler negative DVT   -Xray right knee  negative for acute fracture  -Posterior Calf Pain  - Le dopplers negative  -  CT RLE with finding of hematoma, vascular consulted would monitor for now, can continue dvt ppx given risk of COVID - appears improved on exam   - LE ANGEL performed - with +moderate PAD  - prafo to the RLE  -pt inr  for episodes of bruising    #Acute Hypoxic Respiratory Failure secondary to COVID-19 PNA  Date of COVID testin20  - Hydroxychloroquine not given, pt with prolonged QTc  - EKG - QTc: 500 (), incomplete RBBB  - Incentive spirometry, robitussin DM, ocean spray  -patient tolerating therapies without o2  -will need re-swab next week prior to dc    #UTI  -started on macrobid with bacid until 5/15  -urine cultures - +E coli >100k macrobid sensitive  -complete course Rx    #BRADY 2/2 to dehydration vs UTI  - Cr uptrending s/p 1 L overnight, bladder scan to r/o retention -negative  BMP  improved  encourage po fluids    #HTN  - Lopressor 25 q12, amlodipine 5 qd, hydralazine 50mg qd  - BP controlled     #type II Mobitz block, known RBBB  - s/p Medtronic Micra AV+ leadless pacemaker  - outpt EP followup    #T2DM.  - SSI ACHS  -diabetic educator consult appreciated :  ·	Start Metformin 1000 mg w/ breakfast and dinner. monitor for GI symptoms and attempt to wean/discontinue premeal for compliance and management at home  ·	will decrease insulin doses to avoid potential hypoglycemia.  Will adjust up or down as needed in response to metformin  ·	lantus 20 units qhs   ·	off lispro  FS well- controlled     #HLD  - cont simvastatin 20 qhs    #mood/sleep  - Prozac 10  - melatonin 3    #Pain Mgmt   - Tylenol PRN    #GI/Bowel Mgmt   - Continent  - pepcid    #/Bladder Mgmt   - Continent, PVR negative    #FEN   - Diet - Regular + Thins  [CCHO]  DASH/TLC      #skin:  abrasion back. bacitracin and DSD daily     #- DVT PPX  - on hold for bleed    #Case discussed in IDT rounds :  Patient requires set up UE dressing and set up for eating and grooming, min assist LE dressing in bed. Max assist toileting and mod-max assist shower transfers. 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 - holding due to finding of SAH  - fall and aspiration precautions  - cont prozac for motor recovery  -right SAFO evalution with liner for support given foot drop and inversion. Discussed with orthotist, will teach patient and family to ACE wrap foot for foot drop and refer for casting as outpatient  -PRECAUTIONS: airborne, contact, aspiration, cardiac, fall  -: new Mild subarachnoid hemorrhage in the left frontal lobe stable on repeat imaging 5/10  -repeat CT head today  - STABLE NO NEW BLEED. as per neuro, no neurological contraindication to resume DVT ppx if improving. Will f/u re: timing of MRI    #right Leg pain: knee anterior and suprapatellar effusion. RESOLVED  -add lidoderm patch for pain daily, ibuprofen dcd  - Doppler negative DVT   -Xray right knee  negative for acute fracture  -Posterior Calf Pain  - Le dopplers negative  -  CT RLE with finding of hematoma, vascular consulted would monitor for now, can continue dvt ppx given risk of COVID - appears improved on exam   - LE ANGEL performed - with +moderate PAD  - PRAFO to the RLE to prevent further contracture/improve positioning/tone. Discussed with patient  -Coag/Pt/INR  for episodes of bruising, cerebral SAH  -Xray right ankle    #Acute Hypoxic Respiratory Failure secondary to COVID-19 PNA  Date of COVID testin20  - Hydroxychloroquine not given, pt with prolonged QTc  - EKG - QTc: 500 (), incomplete RBBB  - Incentive spirometry, robitussin DM, ocean spray  -patient tolerating therapies without o2  -will need re-swab next week prior to dc    #UTI  -started on macrobid with bacid until 5/15  -urine cultures - +E coli >100k macrobid sensitive  -complete course Rx    #BRADY 2/2 to dehydration vs UTI  - Cr uptrending s/p 1 L overnight, bladder scan to r/o retention -negative  BMP  improved  encourage po fluids. Monitor    #HTN  - Lopressor 25 q12, amlodipine 5 qd, hydralazine 50mg qd  - BP controlled     #type II Mobitz block, known RBBB  - s/p Medtronic Micra AV+ leadless pacemaker  - outpt EP followup    #T2DM.  - SSI ACHS  -diabetic educator consult appreciated  continue metformin 100 mg BID  -lantus 20 units qhs  FS well- controlled     #HLD  - cont simvastatin 20 qhs    #mood/sleep  - Prozac 10  - melatonin 3    #Pain Mgmt   - Tylenol PRN    #GI/Bowel Mgmt   - Continent  - pepcid    #/Bladder Mgmt   - Continent, PVR negative    #FEN   - Diet - Regular + Thins  [CCHO]  DASH/TLC      #skin:  abrasion back. bacitracin and DSD daily     #- DVT PPX  - on hold for bleed    #Case discussed in IDT rounds :  Patient requires set up UE dressing and set up for eating and grooming, min assist LE dressing in bed. Max assist toileting and mod-max assist shower transfers. 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 - holding due to finding of SAH  - fall and aspiration precautions  - cont prozac for motor recovery  -right SAFO evalution with liner for support given foot drop and inversion. Discussed with orthotist, will teach patient and family to ACE wrap foot for foot drop and refer for casting as outpatient  -PRECAUTIONS: airborne, contact, aspiration, cardiac, fall  -: new Mild subarachnoid hemorrhage in the left frontal lobe stable on repeat imaging 5/10  -repeat CT head today  - STABLE NO NEW BLEED. as per neuro, no neurological contraindication to resume DVT ppx if improving. Will f/u re: timing of MRI    #right Leg pain: knee anterior and suprapatellar effusion. RESOLVED  -add lidoderm patch for pain daily, ibuprofen dcd  - Doppler negative DVT   -Xray right knee  negative for acute fracture  -Posterior Calf Pain  - Le dopplers negative  -  CT RLE with finding of hematoma, vascular consulted would monitor for now, can continue dvt ppx given risk of COVID - appears improved on exam   - LE ANGEL performed - with +moderate PAD  - PRAFO to the RLE to prevent further contracture/improve positioning/tone. Discussed with patient  -Coag/Pt/INR  for episodes of bruising, cerebral SAH  -Xray right ankle    #Acute Hypoxic Respiratory Failure secondary to COVID-19 PNA  Date of COVID testin20  - Hydroxychloroquine not given, pt with prolonged QTc  - EKG - QTc: 500 (), incomplete RBBB  - Incentive spirometry, robitussin DM, ocean spray  -patient tolerating therapies without o2  -will need re-swab next week prior to dc    #UTI  -started on macrobid with bacid until 5/15  -urine cultures - +E coli >100k macrobid sensitive  -complete course Rx    #BRADY 2/2 to dehydration vs UTI  - Cr uptrending s/p 1 L overnight, bladder scan to r/o retention -negative  BMP  improved  encourage po fluids. Monitor    #HTN  - Lopressor 25 q12, amlodipine 5 qd, hydralazine 50mg qd  - BP controlled     #type II Mobitz block, known RBBB  - s/p Medtronic Micra AV+ leadless pacemaker  - outpt EP followup    #T2DM.  - SSI ACHS  -diabetic educator consult appreciated  continue metformin 100 mg BID  -lantus 20 units qhs  FS well- controlled     #HLD  - cont simvastatin 20 qhs    #mood/sleep  - Prozac 10  - melatonin 3    #Pain Mgmt   - Tylenol PRN    #GI/Bowel Mgmt   - Continent  - pepcid    #/Bladder Mgmt   - Continent, PVR negative    #FEN   - Diet - Regular + Thins  [CCHO]  DASH/TLC      #skin:  abrasion back. bacitracin and DSD daily     #- DVT PPX  - on hold for bleed    #Case discussed in IDT rounds :  Patient requires set up UE dressing and set up for eating and grooming, min assist LE dressing in bed. Max assist toileting and mod-max assist shower transfers. Improvement in yes/no reliability and expression  -SAFO with liner right LE  -goas: will need diabetic education, min -mod assist transfers, mod assist for communication and all iADLs  -caregiver training  -target dc: change date to 5/15/20 with home Pt, OT, SLP and caregiver support    #LABS  neuro f/u re: DVT PPX  Head CT --> neuro f/u re: MRI  pt/inr   Xray right ankle    addendum: discussed with neurology, will repeat Head CT Friday 5/15. Hold off on MRI for now

## 2020-05-12 NOTE — PROGRESS NOTE ADULT - ASSESSMENT
Patient is a 69 year old woman admitted 4/23/20 to [facility].  She had been hospitalized on 4/8/20 with Covid 19 (+). She was found to have a Mobitz type II block and a pacemaker was placed. On 4/16 she was found aphasic with right-sided hemiparesis. A CT head noted a left frontoparietal infarct. A CTA Head and Neck noted Left ICA stenosis without occlusion. She was placed on ASA.  She was also  placed on full dose of lovenox with consideration of hypercoagulable state and COVID -19 (+). Today noted extensive ecchymosis involving the right posterior calf and patient complains of pain involving the same area. She notes mild HA generalized . She denies any change with difficulty with speech. She states no change with right arm weakness and inability to move right leg. She denies other neurological complaints. She denies other complaints. Neurological exam as above. Today, Saturday, notes less calf pain. She continues with mild HA and no change with neurological complaints. Today, Tuesday she denies any HA. She notes no change with neurological complaints.      1) CT head 5/9 as above new mild SAH left frontal. Evolving subacute left MCA infarcts, chronic right inf cerebellar hemispheric infarct, small arachnoid cyst adjacent to left sylvian fissure.  2) CT Head 5/10  mild SAH left frontal unchanged from CT Head 5/9.  No new hemorrhage. Stable chronic Left MCA infarcts. Stable chronic right inf cerebellar infarct. Stable chronic small subarachnoid cyst.  3) Agree with ASA 81 mg daily discontinued and Lovenox discontinued.  4) CT head 5/12 as above again noted low level blood left anterior paracentral region, no evidence of new hemorrhage. Stable follow-up with no significant change. Ischemic changes with now mature appearance of Left MCA and OLU infarcts seen 4/16/20. follow-up MRI imaging recommended for further assessment.  5) Would repeat CT head 5/15, Friday.

## 2020-05-12 NOTE — PROGRESS NOTE ADULT - SUBJECTIVE AND OBJECTIVE BOX
Patient is a 69 year old woman admitted 4/23/20 to [facility].  She had been hospitalized on 4/8/20 with Covid 19 (+). She was found to have a Mobitz type II block and a pacemaker was placed. On 4/16 she was found aphasic with right-sided hemiparesis. A CT head noted a left frontoparietal infarct. A CTA Head and Neck noted Left ICA stenosis without occlusion. She was placed on ASA.  She was also  placed on full dose of lovenox with consideration of hypercoagulable state and COVID -19 (+). Today noted extensive ecchymosis involving the right posterior calf and patient complains of pain involving the same area. She notes mild HA generalized . She denies any change with difficulty with speech. She states no change with right arm weakness and inability to move right leg. She denies other neurological complaints. She denies other complaints. Today, Saturday, the patient states the right calf pain is less. She continues with mild HA and no change with neurological complaints. Today, Tuesday, she denies HA. She denies any change with her neurological complaints.    PMH: As above.          HTN          HLD         DM             SH: No allergy    Exam: Awake, alert, speaks to this physician in [place]ic           Speech- decreased fluency, responds with 1-2 and occasionally 3 words in response to questions and searches for words, follows commands promptly, no dysarthria           Pupils 2.5mm, reactive, RHH              Motor tone and strength-RUE-4/5      LUE 5/5                                                 RLE-0/5,      LLE 5/5        < from: CT Head No Cont (05.12.20 @ 10:58) >    FINDINGS:      As compared to prior study, there is no significant interval change. Again there are ischemic changes in the left hemisphere with a now mature appearance of the left the MCA and OLU infarcts are present on 4/16/2020. Again noted is low-level blood in the left the anterior paracentral region. There is no evidence of rebleeding or new hemorrhage.    Also noted are involutional changes in both hemispheres with volume loss.    Ventricles are midline    Chronic ischemic changes are again noted in the cerebellum.    A left temporal fossa arachnoid cyst is again noted.    IMPRESSION:    1)  stable follow-up study with no significant change.  2)  follow-up MR imaging recommended for further assessment.                < end of copied text >                 < from: CT Head No Cont (05.10.20 @ 09:18) >    TECHNIQUE: Noncontrast CT of the head. Multiplanar reformations are submitted.    FINDINGS: Mild subarachnoid hemorrhage in the left frontal lobe, unchanged. Stable chronic left MCA territory infarcts. Stable chronic right inferior cerebellar hemisphere infarcts. Consider MRI as clinically warranted. Stable left frontal lobe arachnoid cyst.    There is periventricular and subcortical white matter hypodensity without mass effect, nonspecific, likely representing mild chronic microvascular ischemic changes. There is no compelling evidence for an acute transcortical infarction. There is no evidence of mass, mass effect, midline shift or extra-axial fluid collection. The lateral ventricles and cortical sulci are age-appropriate in size and configuration. The orbits, mastoid air cells and visualized paranasal sinuses are unremarkable. The calvariumis intact.    IMPRESSION: Mild subarachnoid hemorrhage in the left frontal lobe, unchanged. No hydrocephalus. No new hemorrhage.      < from: CT Head No Cont (05.09.20 @ 17:50) >    FINDINGS: Small arachnoid cyst adjacent to the left sylvian fissure on image 8, series 2 measures 2.2 x 2.2 cm, unchanged in size but demonstrates subtle increased density within it.     Subacute/chronic left frontal infarct with mild subarachnoid hemorrhage is demonstrated on image 15, series 2.    Evolving subacute/chronic left parietal lobe infarct. Chronic right inferior cerebellar hemisphere infarct.    There is periventricular and subcortical white matter hypodensity without mass effect, nonspecific, likely representing mild chronic microvascular ischemic changes. There is no compelling evidence for an acute transcortical infarction. There is no other evidence of mass, mass effect, midline shift or extra-axial fluid collection. The lateral ventricles and cortical sulci are age-appropriate in size and configuration. The orbits, mastoid air cells and visualized paranasal sinuses are unremarkable. The calvarium is intact.    IMPRESSION:  New mild subarachnoid hemorrhage in the paramedian left frontal lobe.Evolving subacute/chronic left MCA territory infarcts.. Findings discussed with Dr. Cross.          <

## 2020-05-13 PROCEDURE — 99233 SBSQ HOSP IP/OBS HIGH 50: CPT

## 2020-05-13 PROCEDURE — 99233 SBSQ HOSP IP/OBS HIGH 50: CPT | Mod: GC

## 2020-05-13 RX ORDER — FLUOXETINE HCL 10 MG
20 CAPSULE ORAL DAILY
Refills: 0 | Status: DISCONTINUED | OUTPATIENT
Start: 2020-05-14 | End: 2020-05-29

## 2020-05-13 RX ORDER — LACTULOSE 10 G/15ML
10 SOLUTION ORAL ONCE
Refills: 0 | Status: COMPLETED | OUTPATIENT
Start: 2020-05-13 | End: 2020-05-13

## 2020-05-13 RX ADMIN — POLYETHYLENE GLYCOL 3350 17 GRAM(S): 17 POWDER, FOR SOLUTION ORAL at 12:13

## 2020-05-13 RX ADMIN — Medication 1 SPRAY(S): at 00:23

## 2020-05-13 RX ADMIN — Medication 650 MILLIGRAM(S): at 09:00

## 2020-05-13 RX ADMIN — INSULIN GLARGINE 20 UNIT(S): 100 INJECTION, SOLUTION SUBCUTANEOUS at 22:17

## 2020-05-13 RX ADMIN — Medication 100 MILLIGRAM(S): at 08:51

## 2020-05-13 RX ADMIN — LIDOCAINE 1 PATCH: 4 CREAM TOPICAL at 12:13

## 2020-05-13 RX ADMIN — FAMOTIDINE 20 MILLIGRAM(S): 10 INJECTION INTRAVENOUS at 12:13

## 2020-05-13 RX ADMIN — Medication 1 SPRAY(S): at 17:50

## 2020-05-13 RX ADMIN — Medication 1 APPLICATION(S): at 22:08

## 2020-05-13 RX ADMIN — ATORVASTATIN CALCIUM 10 MILLIGRAM(S): 80 TABLET, FILM COATED ORAL at 22:10

## 2020-05-13 RX ADMIN — Medication 25 MILLIGRAM(S): at 06:31

## 2020-05-13 RX ADMIN — Medication 10 MILLIGRAM(S): at 12:13

## 2020-05-13 RX ADMIN — LACTULOSE 10 GRAM(S): 10 SOLUTION ORAL at 12:14

## 2020-05-13 RX ADMIN — METFORMIN HYDROCHLORIDE 1000 MILLIGRAM(S): 850 TABLET ORAL at 08:50

## 2020-05-13 RX ADMIN — Medication 3 MILLIGRAM(S): at 22:10

## 2020-05-13 RX ADMIN — Medication 25 MILLIGRAM(S): at 17:47

## 2020-05-13 RX ADMIN — Medication 1 TABLET(S): at 12:13

## 2020-05-13 RX ADMIN — Medication 50 MILLIGRAM(S): at 06:30

## 2020-05-13 RX ADMIN — Medication 1 APPLICATION(S): at 06:30

## 2020-05-13 RX ADMIN — Medication 1 SPRAY(S): at 13:00

## 2020-05-13 RX ADMIN — GABAPENTIN 300 MILLIGRAM(S): 400 CAPSULE ORAL at 22:10

## 2020-05-13 RX ADMIN — Medication 650 MILLIGRAM(S): at 23:06

## 2020-05-13 RX ADMIN — Medication 1 SPRAY(S): at 06:31

## 2020-05-13 RX ADMIN — AMLODIPINE BESYLATE 5 MILLIGRAM(S): 2.5 TABLET ORAL at 06:29

## 2020-05-13 RX ADMIN — METFORMIN HYDROCHLORIDE 1000 MILLIGRAM(S): 850 TABLET ORAL at 17:47

## 2020-05-13 NOTE — PROGRESS NOTE ADULT - SUBJECTIVE AND OBJECTIVE BOX
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 No overnight events reported. mild RLE pain. no other complaints.    ALLERGIES:  No Known Allergies    MEDICATIONS  (STANDING):  amLODIPine   Tablet 5 milliGRAM(s) Oral daily  ammonium lactate 12% Lotion 1 Application(s) Topical two times a day  atorvastatin 10 milliGRAM(s) Oral at bedtime  BACItracin   Ointment 1 Application(s) Topical daily  dextrose 5%. 1000 milliLiter(s) (50 mL/Hr) IV Continuous <Continuous>  dextrose 50% Injectable 12.5 Gram(s) IV Push once  dextrose 50% Injectable 25 Gram(s) IV Push once  dextrose 50% Injectable 25 Gram(s) IV Push once  famotidine    Tablet 20 milliGRAM(s) Oral daily  FLUoxetine 20 milliGRAM(s) Oral daily  gabapentin 300 milliGRAM(s) Oral at bedtime  hydrALAZINE 50 milliGRAM(s) Oral daily  insulin glargine Injectable (LANTUS) 20 Unit(s) SubCutaneous at bedtime  insulin lispro (HumaLOG) corrective regimen sliding scale   SubCutaneous three times a day before meals  insulin lispro (HumaLOG) corrective regimen sliding scale   SubCutaneous at bedtime  lidocaine   Patch 1 Patch Transdermal daily  melatonin 3 milliGRAM(s) Oral at bedtime  metFORMIN 1000 milliGRAM(s) Oral two times a day with meals  metoprolol tartrate 25 milliGRAM(s) Oral every 12 hours  polyethylene glycol 3350 17 Gram(s) Oral daily  sodium chloride 0.65% Nasal 1 Spray(s) Both Nostrils four times a day  sodium chloride 0.9% Bolus 1000 milliLiter(s) IV Bolus once    MEDICATIONS  (PRN):  acetaminophen   Tablet .. 650 milliGRAM(s) Oral every 6 hours PRN Temp greater or equal to 38C (100.4F), Mild Pain (1 - 3)  benzonatate 100 milliGRAM(s) Oral every 8 hours PRN Cough  dextrose 40% Gel 15 Gram(s) Oral once PRN Blood Glucose LESS THAN 70 milliGRAM(s)/deciliter  glucagon  Injectable 1 milliGRAM(s) IntraMuscular once PRN Glucose LESS THAN 70 milligrams/deciliter  lactulose Syrup 10 Gram(s) Oral daily PRN constipation  saline laxative (FLEET) Rectal Enema 1 Enema Rectal once PRN if no BM by this evening 5/13/20    Vital Signs Last 24 Hrs  T(F): 98.2 (13 May 2020 07:43), Max: 98.2 (13 May 2020 07:43)  HR: 67 (13 May 2020 07:43) (67 - 84)  BP: 113/65 (13 May 2020 07:43) (113/65 - 143/72)  RR: 16 (13 May 2020 07:43) (16 - 17)  SpO2: 96% (13 May 2020 07:43) (96% - 97%)  I&O's Summary      GENERAL: NAD  HEAD:  Atraumatic, Normocephalic  EYES: EOMI, PERRLA, sclera clear  NECK: Supple  CHEST/LUNG: Clear to auscultation bilaterally; No wheeze  HEART: Regular rate and rhythm; No murmurs, rubs, or gallops  ABDOMEN: Soft, Nontender, Nondistended; Bowel sounds present  EXTREMITIES:  No clubbing, cyanosis, or edema. + wrap dressing on RLE  NEUROLOGY: RLE weakness. otherwise, muscle strength 5/5. sensation intact  SKIN: No rashes     LABS:                        10.2   10.56 )-----------( 411      ( 11 May 2020 08:04 )             31.6     05-11    134  |  99  |  14  ----------------------------<  110  4.4   |  24  |  0.77    Ca    9.6      11 May 2020 08:04    TPro  7.8  /  Alb  3.7  /  TBili  0.7  /  DBili  x   /  AST  19  /  ALT  17  /  AlkPhos  61  05-11        eGFR if Non African American: 80 mL/min/1.73M2 (05-11-20 @ 08:04)  eGFR if : 92 mL/min/1.73M2 (05-11-20 @ 08:04)              04-17 Chol 103 mg/dL LDL 40 mg/dL HDL 34 mg/dL Trig 146 mg/dL              POCT Blood Glucose.: 130 mg/dL (13 May 2020 12:11)  POCT Blood Glucose.: 136 mg/dL (13 May 2020 07:40)  POCT Blood Glucose.: 246 mg/dL (12 May 2020 20:51)  POCT Blood Glucose.: 114 mg/dL (12 May 2020 16:35)          Urine Culture - 48 Hour Hold (collected 08 May 2020 09:15)  Source: .Urine  Final Report (11 May 2020 08:22):    >100,000 CFU/ml Escherichia coli  Organism: Escherichia coli (11 May 2020 08:22)  Organism: Escherichia coli (11 May 2020 08:22)      -  Amikacin: S <=16      -  Ampicillin: R >16 These ampicillin results predict results for amoxicillin      -  Ampicillin/Sulbactam: R >16/8 Enterobacter, Citrobacter, and Serratia may develop resistance during prolonged therapy (3-4 days)      -  Aztreonam: S <=4      -  Cefazolin: S <=8 ([id number]_[id number]) For uncomplicated UTI with K. pneumoniae, E. coli, or P. mirablis: RENNY <=16 is sensitive and RENNY >=32 is resistant. This also predicts results for oral agents cefaclor, cefdinir, cefpodoxime, cefprozil, cefuroxime axetil, cephalexin and locarbef for uncomplicated UTI. Note that some isolates may be susceptible to these agents while testing resistant to cefazolin.      -  Cefepime: S <=4      -  Cefoxitin: S <=8      -  Ceftriaxone: S <=1 Enterobacter, Citrobacter, and Serratia may develop resistance during prolonged therapy      -  Ciprofloxacin: R >2      -  Gentamicin: S <=4      -  Imipenem: S <=1      -  Levofloxacin: R >4      -  Meropenem: S <=1      -  Nitrofurantoin: S <=32 Should not be used to treat pyelonephritis      -  Piperacillin/Tazobactam: S <=16      -  Tigecycline: S <=2      -  Tobramycin: S <=4      -  Trimethoprim/Sulfamethoxazole: R >2/38      Method Type: RENNY      RADIOLOGY & ADDITIONAL TESTS:    Care Discussed with Consultants/Other Providers: rehab

## 2020-05-13 NOTE — PROGRESS NOTE ADULT - ASSESSMENT
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 - holding due to finding of SAH  - fall and aspiration precautions  - cont prozac for motor recovery  -right SAFO evalution with liner for support given foot drop and inversion. Discussed with orthotist, will teach patient and family to ACE wrap foot for foot drop and refer for casting as outpatient  -PRECAUTIONS: airborne, contact, aspiration, cardiac, fall  -: new Mild subarachnoid hemorrhage in the left frontal lobe stable on repeat imaging 5/10  -repeat CT head today  - STABLE NO NEW BLEED. as per neuro, no neurological contraindication to resume DVT ppx if improving. Will f/u re: timing of MRI    #right Leg pain: knee anterior and suprapatellar effusion. RESOLVED  -add lidoderm patch for pain daily, ibuprofen dcd  - Doppler negative DVT   -Xray right knee  negative for acute fracture  -Posterior Calf Pain  - Le dopplers negative  -  CT RLE with finding of hematoma, vascular consulted would monitor for now, can continue dvt ppx given risk of COVID - appears improved on exam   - LE ANGEL performed - with +moderate PAD  - PRAFO to the RLE to prevent further contracture/improve positioning/tone. Discussed with patient  -Coag/Pt/INR  for episodes of bruising, cerebral SAH  -Xray right ankle    #Acute Hypoxic Respiratory Failure secondary to COVID-19 PNA  Date of COVID testin20  - Hydroxychloroquine not given, pt with prolonged QTc  - EKG - QTc: 500 (), incomplete RBBB  - Incentive spirometry, robitussin DM, ocean spray  -patient tolerating therapies without o2  -will need re-swab next week prior to dc    #UTI  -started on macrobid with bacid until 5/15  -urine cultures - +E coli >100k macrobid sensitive  -complete course Rx    #BRADY 2/2 to dehydration vs UTI  - Cr uptrending s/p 1 L overnight, bladder scan to r/o retention -negative  BMP  improved  encourage po fluids. Monitor    #HTN  - Lopressor 25 q12, amlodipine 5 qd, hydralazine 50mg qd  - BP controlled     #type II Mobitz block, known RBBB  - s/p Medtronic Micra AV+ leadless pacemaker  - outpt EP followup    #T2DM.  - SSI ACHS  -diabetic educator consult appreciated  continue metformin 100 mg BID  -lantus 20 units qhs  FS well- controlled     #HLD  - cont simvastatin 20 qhs    #mood/sleep  - Prozac 10  - melatonin 3    #Pain Mgmt   - Tylenol PRN    #GI/Bowel Mgmt   - Continent  - pepcid    #/Bladder Mgmt   - Continent, PVR negative    #FEN   - Diet - Regular + Thins  [CCHO]  DASH/TLC      #skin:  abrasion back. bacitracin and DSD daily     #- DVT PPX  - on hold for bleed    #Case discussed in IDT rounds :  Patient requires set up UE dressing and set up for eating and grooming, min assist LE dressing in bed. Max assist toileting and mod-max assist shower transfers. Improvement in yes/no reliability and expression  -SAFO with liner right LE  -goas: will need diabetic education, min -mod assist transfers, mod assist for communication and all iADLs  -caregiver training  -target dc: change date to 5/15/20 with home Pt, OT, SLP and caregiver support    #LABS  neuro f/u re: DVT PPX  Head CT --> neuro f/u re: MRI  pt/inr   Xray right ankle    addendum: discussed with neurology, will repeat Head CT Friday 5/15. Hold off on MRI for now ASSESSMENT/PLAN  SHIREEN CASH is a 68yo Female with HTN, HLD, T2DM, hx CVA, GERD, known RBBB with COVID-19 debility and Left frontal and parietla CVA with residual right HP, foot drop, patricio inattention aphasia    #Left frontal/parietal CVA  - continue  Comprehensive Rehab Program of PT/OT/SLP  - cont ASA, lovenox therapeutic dose as may be hypercoagulable due to COVID. - holding due to finding of SAH  - cont prozac for motor recovery. increase to 20 mg daily for mood d/o   -neuropsychology re-evaluation for worsening adjustment symptoms and support   -right SAFO evalution with liner for support given foot drop and inversion. Discussed with orthotist, will teach patient and family to ACE wrap foot for foot drop and refer for casting as outpatient  -PRECAUTIONS: airborne, contact, aspiration, cardiac, fall  -Head CT : new Mild subarachnoid hemorrhage in the left frontal lobe stable on repeat imaging 5/10  -repeat CT head  - STABLE NO NEW BLEED  -neuro consult appreciated . Hold off on MRI, repeat Head CT 5/15 discussed with patient    #right Leg pain: knee anterior and suprapatellar effusion. RESOLVED  -add lidoderm patch for pain daily, ibuprofen dcd  - Doppler negative DVT ,   -  CT RLE with finding of hematoma, vascular consulted would monitor for now, can continue dvt ppx given risk of COVID - appears improved on exam   - LE ANGEL performed - with +moderate PAD  -Xray right knee  negative for acute fracture  Xray right ankle  negative for fracture +calcaneal spur  -Posterior Calf Pain  -Coag/Pt/INR  for episodes of bruising, cerebral SAH    #Acute Hypoxic Respiratory Failure secondary to COVID-19 PNA  Date of COVID testin20  - Hydroxychloroquine not given, pt with prolonged QTc  - EKG - QTc: 500 (), incomplete RBBB  - Incentive spirometry, robitussin DM, ocean spray  -patient tolerating therapies without O2  -reswab as patient may need LALA when medically stable. Ordered     #UTI  -started on macrobid with bacid until 5/15  -urine cultures - +E coli >100k macrobid sensitive    #BRADY 2/2 to dehydration vs UTI  encourage po fluids. Monitor  CBC     #HTN  - Lopressor 25 q12, amlodipine 5 qd, hydralazine 50mg qd    #type II Mobitz block, known RBBB  - s/p Medtronic Micra AV+ leadless pacemaker  - outpt EP followup    #T2DM.  - SSI ACHS  -diabetic educator consult appreciated  continue metformin 100 mg BID  -lantus 20 units qhs    #HLD  - cont simvastatin 20 qhs    #insomnia  - melatonin 3    #Pain Mgmt   - Tylenol PRN    #GI/Bowel Mgmt   - Continent  - pepcid    #/Bladder Mgmt   - Continent, PVR negative    #FEN   - Diet - Regular + Thins  [CCHO]  DASH/TLC      #skin:  abrasion back. bacitracin and DSD daily     #- DVT PPX  - on hold for bleed    #Case discussed in IDT rounds :  max assist toilet transfers, stnad-pivot mod assist limited by pain, Mod assist transfers. Deficits i nproblem-solving, comprehension, memory, Has some improvement in insight which may also impact mood  -goals: mod assist bADLs, transfers, ambulation , requires 24 hours supervision  -patient dose not appear to have that level of support at home at this time. Will look for LALA to continue Ot, PT, SLP to improve funciton once medically stable  COVID reswab     #LABS  CBC BMp   pt/inr   Repeat Head CT 5/15  COVID reswab 513 ASSESSMENT/PLAN  SHIREEN CASH is a 68yo Female with HTN, HLD, T2DM, hx CVA, GERD, known RBBB with COVID-19 debility and Left frontal and parietla CVA with residual right HP, foot drop, patricio inattention aphasia    #Left frontal/parietal CVA  - continue  Comprehensive Rehab Program of PT/OT/SLP  - cont ASA, lovenox therapeutic dose as may be hypercoagulable due to COVID. - holding due to finding of SAH  - cont prozac for motor recovery. increase to 20 mg daily for mood d/o   -neuropsychology re-evaluation for worsening adjustment symptoms and support   -right SAFO evalution with liner for support given foot drop and inversion. Discussed with orthotist, will teach patient and family to ACE wrap foot for foot drop and refer for casting as outpatient  -PRECAUTIONS: airborne, contact, aspiration, cardiac, fall  -Head CT : new Mild subarachnoid hemorrhage in the left frontal lobe stable on repeat imaging 5/10  -repeat CT head  - STABLE NO NEW BLEED  -neuro consult appreciated . Hold off on MRI, repeat Head CT 5/15 discussed with patient    #right Leg pain: knee anterior and suprapatellar effusion. RESOLVED  -add lidoderm patch for pain daily, ibuprofen dcd  - Doppler negative DVT ,   -  CT RLE with finding of hematoma, vascular consulted would monitor for now, can continue dvt ppx given risk of COVID - appears improved on exam   - LE ANGEL performed - with +moderate PAD  -Xray right knee  negative for acute fracture  Xray right ankle  negative for fracture +calcaneal spur  -Posterior Calf Pain  -Coag/Pt/INR  for episodes of bruising, cerebral SAH    #Acute Hypoxic Respiratory Failure secondary to COVID-19 PNA  Date of COVID testin20  - Hydroxychloroquine not given, pt with prolonged QTc  - EKG - QTc: 500 (), incomplete RBBB  - Incentive spirometry, robitussin DM, ocean spray  -patient tolerating therapies without O2  -reswab as patient may need LALA when medically stable. Ordered     #UTI  -started on macrobid with bacid until 5/15  -urine cultures - +E coli >100k macrobid sensitive    #BRADY 2/2 to dehydration vs UTI  encourage po fluids. Monitor  CBC     #HTN  - Lopressor 25 q12, amlodipine 5 qd, hydralazine 50mg qd    #type II Mobitz block, known RBBB  - s/p Medtronic Micra AV+ leadless pacemaker  - outpt EP followup    #T2DM.  - SSI ACHS  -diabetic educator consult appreciated  continue metformin 100 mg BID  -lantus 20 units qhs    #HLD  - cont simvastatin 20 qhs    #insomnia  - melatonin 3    #Pain Mgmt   - Tylenol PRN    #GI/Bowel Mgmt   - Continent  - pepcid    #/Bladder Mgmt   - Continent, PVR negative    #FEN   - Diet - Regular + Thins  [CCHO]  DASH/TLC      #skin:  abrasion back. bacitracin and DSD daily     #- DVT PPX  - on hold for bleed    #Case discussed in IDT rounds :  max assist toilet transfers, stnad-pivot mod assist limited by pain, Mod assist transfers. Deficits i nproblem-solving, comprehension, memory, Has some improvement in insight which may also impact mood  -goals: mod assist bADLs, transfers, ambulation , requires 24 hours supervision  -patient dose not appear to have that level of support at home at this time. Will look for LALA to continue Ot, PT, SLP to improve funciton once medically stable  COVID reswab     #LABS  CBC BMp   pt/inr   Repeat Head CT 5/15  COVID reswab   neuropsychology f/u

## 2020-05-13 NOTE — PROGRESS NOTE ADULT - EXTREMITIES COMMENTS
calf swelling right LE improved  improving ecchymoses lateral lower leg as well as medial  proximal calf improved TTP  medial malleolar ecchymoses improed

## 2020-05-13 NOTE — PROGRESS NOTE ADULT - COMMENTS
Patient seen with assistance of language line  in [place] at 8:30 AM. Patient reports that pain in right ankle is improving but has pain with standing Xray right ankle negative for fracture. Also c/o constipation, no BM x 4-5 days. No durther dysuria, no N/V, completed Abx

## 2020-05-13 NOTE — PROGRESS NOTE ADULT - SUBJECTIVE AND OBJECTIVE BOX
Patient is a 69y old  Female who presents with a chief complaint of left frontal parietal CVA with right HP and aphasia, COVID+ 4/8/20 (12 May 2020 13:17)      HPI:  SHIREEN CASH is a 68yo female RH dominant with PMH of HTN, HLD, T2DM, hx CVA, GERD, known RBBB presented to [facility] ED on 4/8/20 with complaints of fever, chills, cough, chest pain, SOB for 5 days. Per patient, her  tested +COVID at an outpatient testing facility a few days prior, patient herself had tested negative at the time. They continued to share the same living space. ED workup showed +COVID and Mobitz type II block. EP was consulted and patient is s/p Medtronic Micra AV+ leadless pacemaker implantation via right femoral vein on 4/10/20. Hydroxychloroquine presumably not given due to prolonged QTc.     On 4/16/20, patient developed acute aphasia and right sided weakness. CT head showed an acute LEFT frontal/parietal CVA. CTA head/neck showed LEFT ICA stenosis but no occlusion. tPA was not administered due to timing and no penumbra. Placed on full dose lovenox as thoguht to be hypercoagulable secondary to COVID. Patient was cleared for regular consistency solids, thin liquids. Transferred to [facility] for acute inpatient rehab services 4/23/20. (23 Apr 2020 14:26)      PAST MEDICAL & SURGICAL HISTORY:  H/O gastroesophageal reflux (GERD)  HTN (hypertension)  DM (diabetes mellitus)  History of benign brain tumor      MEDICATIONS  (STANDING):  amLODIPine   Tablet 5 milliGRAM(s) Oral daily  ammonium lactate 12% Lotion 1 Application(s) Topical two times a day  atorvastatin 10 milliGRAM(s) Oral at bedtime  BACItracin   Ointment 1 Application(s) Topical daily  dextrose 5%. 1000 milliLiter(s) (50 mL/Hr) IV Continuous <Continuous>  dextrose 50% Injectable 12.5 Gram(s) IV Push once  dextrose 50% Injectable 25 Gram(s) IV Push once  dextrose 50% Injectable 25 Gram(s) IV Push once  famotidine    Tablet 20 milliGRAM(s) Oral daily  FLUoxetine 10 milliGRAM(s) Oral daily  gabapentin 300 milliGRAM(s) Oral at bedtime  hydrALAZINE 50 milliGRAM(s) Oral daily  insulin glargine Injectable (LANTUS) 20 Unit(s) SubCutaneous at bedtime  insulin lispro (HumaLOG) corrective regimen sliding scale   SubCutaneous three times a day before meals  insulin lispro (HumaLOG) corrective regimen sliding scale   SubCutaneous at bedtime  lactobacillus acidophilus 1 Tablet(s) Oral daily  lidocaine   Patch 1 Patch Transdermal daily  melatonin 3 milliGRAM(s) Oral at bedtime  metFORMIN 1000 milliGRAM(s) Oral two times a day with meals  metoprolol tartrate 25 milliGRAM(s) Oral every 12 hours  nitrofurantoin monohydrate/macrocrystals (MACROBID) 100 milliGRAM(s) Oral two times a day with meals  polyethylene glycol 3350 17 Gram(s) Oral daily  sodium chloride 0.65% Nasal 1 Spray(s) Both Nostrils four times a day  sodium chloride 0.9% Bolus 1000 milliLiter(s) IV Bolus once    MEDICATIONS  (PRN):  acetaminophen   Tablet .. 650 milliGRAM(s) Oral every 6 hours PRN Temp greater or equal to 38C (100.4F), Mild Pain (1 - 3)  benzonatate 100 milliGRAM(s) Oral every 8 hours PRN Cough  dextrose 40% Gel 15 Gram(s) Oral once PRN Blood Glucose LESS THAN 70 milliGRAM(s)/deciliter  glucagon  Injectable 1 milliGRAM(s) IntraMuscular once PRN Glucose LESS THAN 70 milligrams/deciliter  lactulose Syrup 10 Gram(s) Oral daily PRN constipation      Allergies    No Known Allergies    Intolerances          VITALS  69y  Vital Signs Last 24 Hrs  T(C): 36.8 (13 May 2020 07:43), Max: 36.8 (13 May 2020 07:43)  T(F): 98.2 (13 May 2020 07:43), Max: 98.2 (13 May 2020 07:43)  HR: 67 (13 May 2020 07:43) (67 - 84)  BP: 113/65 (13 May 2020 07:43) (113/65 - 143/72)  BP(mean): --  RR: 16 (13 May 2020 07:43) (16 - 17)  SpO2: 96% (13 May 2020 07:43) (96% - 97%)  Daily     Daily         RECENT LABS:                    Urine Culture - 48 Hour Hold (collected 05-08-20 @ 09:15)  Source: .Urine  Final Report (05-11-20 @ 08:22):    >100,000 CFU/ml Escherichia coli  Organism: Escherichia coli (05-11-20 @ 08:22)  Organism: Escherichia coli (05-11-20 @ 08:22)      -  Amikacin: S <=16      -  Ampicillin: R >16 These ampicillin results predict results for amoxicillin      -  Ampicillin/Sulbactam: R >16/8 Enterobacter, Citrobacter, and Serratia may develop resistance during prolonged therapy (3-4 days)      -  Aztreonam: S <=4      -  Cefazolin: S <=8 (MIC_CL_COM_ENTERIC_CEFAZU) For uncomplicated UTI with K. pneumoniae, E. coli, or P. mirablis: RENNY <=16 is sensitive and RENNY >=32 is resistant. This also predicts results for oral agents cefaclor, cefdinir, cefpodoxime, cefprozil, cefuroxime axetil, cephalexin and locarbef for uncomplicated UTI. Note that some isolates may be susceptible to these agents while testing resistant to cefazolin.      -  Cefepime: S <=4      -  Cefoxitin: S <=8      -  Ceftriaxone: S <=1 Enterobacter, Citrobacter, and Serratia may develop resistance during prolonged therapy      -  Ciprofloxacin: R >2      -  Gentamicin: S <=4      -  Imipenem: S <=1      -  Levofloxacin: R >4      -  Meropenem: S <=1      -  Nitrofurantoin: S <=32 Should not be used to treat pyelonephritis      -  Piperacillin/Tazobactam: S <=16      -  Tigecycline: S <=2      -  Tobramycin: S <=4      -  Trimethoprim/Sulfamethoxazole: R >2/38      Method Type: RENNY        CAPILLARY BLOOD GLUCOSE      POCT Blood Glucose.: 136 mg/dL (13 May 2020 07:40)  POCT Blood Glucose.: 246 mg/dL (12 May 2020 20:51)  POCT Blood Glucose.: 114 mg/dL (12 May 2020 16:35)  POCT Blood Glucose.: 179 mg/dL (12 May 2020 11:27)        EXAM:  ANKLE RIGHT (MINIMUM 3 VIEWS)      PROCEDURE DATE:  05/12/2020        INTERPRETATION:  AP, lateral, and oblique views of the right ankle    CLINICAL INDICATION: Medial malleolar pain and bruising; evaluate fracture.    COMPARISON: None     FINDINGS: There is no evidence for acute fracture, dislocation, joint space narrowing, soft tissue swelling or retained radiodense foreign body. The ankle mortise appears intact. Note is made of a plantar calcaneal spur. Vascular calcifications identified.    IMPRESSION: No evidence for acute fracture or dislocation.                  VIVIEN SRIVASTAVA M.D., ATTENDING RADIOLOGIST  This document has been electronically signed. May 12 2020  3:24PM Patient is a 69y old  Female who presents with a chief complaint of left frontal parietal CVA with right HP and aphasia, COVID+ 4/8/20 (12 May 2020 13:17)      HPI:  SHIREEN CASH is a 68yo female RH dominant with PMH of HTN, HLD, T2DM, hx CVA, GERD, known RBBB presented to [facility] ED on 4/8/20 with complaints of fever, chills, cough, chest pain, SOB for 5 days. Per patient, her  tested +COVID at an outpatient testing facility a few days prior, patient herself had tested negative at the time. They continued to share the same living space. ED workup showed +COVID and Mobitz type II block. EP was consulted and patient is s/p Medtronic Micra AV+ leadless pacemaker implantation via right femoral vein on 4/10/20. Hydroxychloroquine presumably not given due to prolonged QTc.     On 4/16/20, patient developed acute aphasia and right sided weakness. CT head showed an acute LEFT frontal/parietal CVA. CTA head/neck showed LEFT ICA stenosis but no occlusion. tPA was not administered due to timing and no penumbra. Placed on full dose lovenox as thoguht to be hypercoagulable secondary to COVID. Patient was cleared for regular consistency solids, thin liquids. Transferred to [facility] for acute inpatient rehab services 4/23/20. (23 Apr 2020 14:26)      PAST MEDICAL & SURGICAL HISTORY:  H/O gastroesophageal reflux (GERD)  HTN (hypertension)  DM (diabetes mellitus)  History of benign brain tumor      MEDICATIONS  (STANDING):  amLODIPine   Tablet 5 milliGRAM(s) Oral daily  ammonium lactate 12% Lotion 1 Application(s) Topical two times a day  atorvastatin 10 milliGRAM(s) Oral at bedtime  BACItracin   Ointment 1 Application(s) Topical daily  dextrose 5%. 1000 milliLiter(s) (50 mL/Hr) IV Continuous <Continuous>  dextrose 50% Injectable 12.5 Gram(s) IV Push once  dextrose 50% Injectable 25 Gram(s) IV Push once  dextrose 50% Injectable 25 Gram(s) IV Push once  famotidine    Tablet 20 milliGRAM(s) Oral daily  FLUoxetine 10 milliGRAM(s) Oral daily  gabapentin 300 milliGRAM(s) Oral at bedtime  hydrALAZINE 50 milliGRAM(s) Oral daily  insulin glargine Injectable (LANTUS) 20 Unit(s) SubCutaneous at bedtime  insulin lispro (HumaLOG) corrective regimen sliding scale   SubCutaneous three times a day before meals  insulin lispro (HumaLOG) corrective regimen sliding scale   SubCutaneous at bedtime  lactobacillus acidophilus 1 Tablet(s) Oral daily  lidocaine   Patch 1 Patch Transdermal daily  melatonin 3 milliGRAM(s) Oral at bedtime  metFORMIN 1000 milliGRAM(s) Oral two times a day with meals  metoprolol tartrate 25 milliGRAM(s) Oral every 12 hours  nitrofurantoin monohydrate/macrocrystals (MACROBID) 100 milliGRAM(s) Oral two times a day with meals  polyethylene glycol 3350 17 Gram(s) Oral daily  sodium chloride 0.65% Nasal 1 Spray(s) Both Nostrils four times a day  sodium chloride 0.9% Bolus 1000 milliLiter(s) IV Bolus once    MEDICATIONS  (PRN):  acetaminophen   Tablet .. 650 milliGRAM(s) Oral every 6 hours PRN Temp greater or equal to 38C (100.4F), Mild Pain (1 - 3)  benzonatate 100 milliGRAM(s) Oral every 8 hours PRN Cough  dextrose 40% Gel 15 Gram(s) Oral once PRN Blood Glucose LESS THAN 70 milliGRAM(s)/deciliter  glucagon  Injectable 1 milliGRAM(s) IntraMuscular once PRN Glucose LESS THAN 70 milligrams/deciliter  lactulose Syrup 10 Gram(s) Oral daily PRN constipation      Allergies    No Known Allergies    Intolerances          VITALS  69y  Vital Signs Last 24 Hrs  T(C): 36.8 (13 May 2020 07:43), Max: 36.8 (13 May 2020 07:43)  T(F): 98.2 (13 May 2020 07:43), Max: 98.2 (13 May 2020 07:43)  HR: 67 (13 May 2020 07:43) (67 - 84)  BP: 113/65 (13 May 2020 07:43) (113/65 - 143/72)  BP(mean): --  RR: 16 (13 May 2020 07:43) (16 - 17)  SpO2: 96% (13 May 2020 07:43) (96% - 97%)  Daily     Daily         RECENT LABS:                    Urine Culture - 48 Hour Hold (collected 05-08-20 @ 09:15)  Source: .Urine  Final Report (05-11-20 @ 08:22):    >100,000 CFU/ml Escherichia coli  Organism: Escherichia coli (05-11-20 @ 08:22)  Organism: Escherichia coli (05-11-20 @ 08:22)      -  Amikacin: S <=16      -  Ampicillin: R >16 These ampicillin results predict results for amoxicillin      -  Ampicillin/Sulbactam: R >16/8 Enterobacter, Citrobacter, and Serratia may develop resistance during prolonged therapy (3-4 days)      -  Aztreonam: S <=4      -  Cefazolin: S <=8 (MIC_CL_COM_ENTERIC_CEFAZU) For uncomplicated UTI with K. pneumoniae, E. coli, or P. mirablis: RENNY <=16 is sensitive and RENNY >=32 is resistant. This also predicts results for oral agents cefaclor, cefdinir, cefpodoxime, cefprozil, cefuroxime axetil, cephalexin and locarbef for uncomplicated UTI. Note that some isolates may be susceptible to these agents while testing resistant to cefazolin.      -  Cefepime: S <=4      -  Cefoxitin: S <=8      -  Ceftriaxone: S <=1 Enterobacter, Citrobacter, and Serratia may develop resistance during prolonged therapy      -  Ciprofloxacin: R >2      -  Gentamicin: S <=4      -  Imipenem: S <=1      -  Levofloxacin: R >4      -  Meropenem: S <=1      -  Nitrofurantoin: S <=32 Should not be used to treat pyelonephritis      -  Piperacillin/Tazobactam: S <=16      -  Tigecycline: S <=2      -  Tobramycin: S <=4      -  Trimethoprim/Sulfamethoxazole: R >2/38      Method Type: RENNY        CAPILLARY BLOOD GLUCOSE      POCT Blood Glucose.: 136 mg/dL (13 May 2020 07:40)  POCT Blood Glucose.: 246 mg/dL (12 May 2020 20:51)  POCT Blood Glucose.: 114 mg/dL (12 May 2020 16:35)  POCT Blood Glucose.: 179 mg/dL (12 May 2020 11:27)        EXAM:  ANKLE RIGHT (MINIMUM 3 VIEWS)      PROCEDURE DATE:  05/12/2020        INTERPRETATION:  AP, lateral, and oblique views of the right ankle    CLINICAL INDICATION: Medial malleolar pain and bruising; evaluate fracture.    COMPARISON: None     FINDINGS: There is no evidence for acute fracture, dislocation, joint space narrowing, soft tissue swelling or retained radiodense foreign body. The ankle mortise appears intact. Note is made of a plantar calcaneal spur. Vascular calcifications identified.    IMPRESSION: No evidence for acute fracture or dislocation.                  VIVIEN SRIVASTAVA M.D., ATTENDING RADIOLOGIST  This document has been electronically signed. May 12 2020  3:24PM

## 2020-05-13 NOTE — PROGRESS NOTE ADULT - ASSESSMENT
68 yo F HTN, HLD, T2DM, hx CVA, GERD, known RBBB with COVID-19 debility and Left frontal and parietal CVA.    # Left frontal parietal CVA  # SAH  - cont statin  - follow up with neurology regarding timing to resume ASA and DVT ppx  - per neuro: repeat CTH 5/15    # RLE hematoma  - pain controlled and improving  - vascular sx: not likely compartment syndrome  - h/h stable  - off lovenox    # COVID-19  - was on empiric therapeutic lovenox for hypercoagulable state due to covid  - saturating 96 % on RA currently  - respiratory status stable, no need to resume full dose lovenox.     # DM II  - FS controlled  - metformin 1000mg q12, Lantus and ISS    # HTN  - BP controlled  - hydralazine, metoprolol, amlodipine     # Mobitz type II AV block s/p PPM, known RBBB    # DVT ppx: ambulation, SCD

## 2020-05-14 LAB
ALBUMIN SERPL ELPH-MCNC: 3.6 G/DL — SIGNIFICANT CHANGE UP (ref 3.3–5)
ALP SERPL-CCNC: 62 U/L — SIGNIFICANT CHANGE UP (ref 40–120)
ALT FLD-CCNC: 18 U/L — SIGNIFICANT CHANGE UP (ref 10–45)
ANION GAP SERPL CALC-SCNC: 9 MMOL/L — SIGNIFICANT CHANGE UP (ref 5–17)
APTT BLD: 33.4 SEC — SIGNIFICANT CHANGE UP (ref 27.5–36.3)
AST SERPL-CCNC: 20 U/L — SIGNIFICANT CHANGE UP (ref 10–40)
BASOPHILS # BLD AUTO: 0.07 K/UL — SIGNIFICANT CHANGE UP (ref 0–0.2)
BASOPHILS NFR BLD AUTO: 0.6 % — SIGNIFICANT CHANGE UP (ref 0–2)
BILIRUB SERPL-MCNC: 0.6 MG/DL — SIGNIFICANT CHANGE UP (ref 0.2–1.2)
BUN SERPL-MCNC: 14 MG/DL — SIGNIFICANT CHANGE UP (ref 7–23)
CALCIUM SERPL-MCNC: 9.4 MG/DL — SIGNIFICANT CHANGE UP (ref 8.4–10.5)
CHLORIDE SERPL-SCNC: 98 MMOL/L — SIGNIFICANT CHANGE UP (ref 96–108)
CO2 SERPL-SCNC: 25 MMOL/L — SIGNIFICANT CHANGE UP (ref 22–31)
CREAT SERPL-MCNC: 0.82 MG/DL — SIGNIFICANT CHANGE UP (ref 0.5–1.3)
EOSINOPHIL # BLD AUTO: 0.44 K/UL — SIGNIFICANT CHANGE UP (ref 0–0.5)
EOSINOPHIL NFR BLD AUTO: 3.8 % — SIGNIFICANT CHANGE UP (ref 0–6)
GLUCOSE SERPL-MCNC: 87 MG/DL — SIGNIFICANT CHANGE UP (ref 70–99)
HCT VFR BLD CALC: 32.9 % — LOW (ref 34.5–45)
HGB BLD-MCNC: 10.9 G/DL — LOW (ref 11.5–15.5)
IMM GRANULOCYTES NFR BLD AUTO: 1 % — SIGNIFICANT CHANGE UP (ref 0–1.5)
INR BLD: 1.18 RATIO — HIGH (ref 0.88–1.16)
LYMPHOCYTES # BLD AUTO: 35.8 % — SIGNIFICANT CHANGE UP (ref 13–44)
LYMPHOCYTES # BLD AUTO: 4.2 K/UL — HIGH (ref 1–3.3)
MCHC RBC-ENTMCNC: 29.1 PG — SIGNIFICANT CHANGE UP (ref 27–34)
MCHC RBC-ENTMCNC: 33.1 GM/DL — SIGNIFICANT CHANGE UP (ref 32–36)
MCV RBC AUTO: 87.7 FL — SIGNIFICANT CHANGE UP (ref 80–100)
MONOCYTES # BLD AUTO: 1.11 K/UL — HIGH (ref 0–0.9)
MONOCYTES NFR BLD AUTO: 9.5 % — SIGNIFICANT CHANGE UP (ref 2–14)
NEUTROPHILS # BLD AUTO: 5.78 K/UL — SIGNIFICANT CHANGE UP (ref 1.8–7.4)
NEUTROPHILS NFR BLD AUTO: 49.3 % — SIGNIFICANT CHANGE UP (ref 43–77)
NRBC # BLD: 0 /100 WBCS — SIGNIFICANT CHANGE UP (ref 0–0)
OSMOLALITY SERPL: 287 MOSMOL/KG — SIGNIFICANT CHANGE UP (ref 280–301)
OSMOLALITY UR: 291 MOSM/KG — SIGNIFICANT CHANGE UP (ref 50–1200)
PLATELET # BLD AUTO: 521 K/UL — HIGH (ref 150–400)
POTASSIUM SERPL-MCNC: 4.7 MMOL/L — SIGNIFICANT CHANGE UP (ref 3.5–5.3)
POTASSIUM SERPL-SCNC: 4.7 MMOL/L — SIGNIFICANT CHANGE UP (ref 3.5–5.3)
PROT SERPL-MCNC: 7.7 G/DL — SIGNIFICANT CHANGE UP (ref 6–8.3)
PROTHROM AB SERPL-ACNC: 13.3 SEC — HIGH (ref 10–12.9)
RBC # BLD: 3.75 M/UL — LOW (ref 3.8–5.2)
RBC # FLD: 15.9 % — HIGH (ref 10.3–14.5)
SARS-COV-2 RNA SPEC QL NAA+PROBE: SIGNIFICANT CHANGE UP
SODIUM SERPL-SCNC: 132 MMOL/L — LOW (ref 135–145)
WBC # BLD: 11.72 K/UL — HIGH (ref 3.8–10.5)
WBC # FLD AUTO: 11.72 K/UL — HIGH (ref 3.8–10.5)

## 2020-05-14 PROCEDURE — 99233 SBSQ HOSP IP/OBS HIGH 50: CPT

## 2020-05-14 RX ADMIN — Medication 1 SPRAY(S): at 13:01

## 2020-05-14 RX ADMIN — Medication 2: at 12:26

## 2020-05-14 RX ADMIN — Medication 1 SPRAY(S): at 06:14

## 2020-05-14 RX ADMIN — LIDOCAINE 1 PATCH: 4 CREAM TOPICAL at 13:01

## 2020-05-14 RX ADMIN — GABAPENTIN 300 MILLIGRAM(S): 400 CAPSULE ORAL at 22:18

## 2020-05-14 RX ADMIN — METFORMIN HYDROCHLORIDE 1000 MILLIGRAM(S): 850 TABLET ORAL at 07:51

## 2020-05-14 RX ADMIN — Medication 1 SPRAY(S): at 00:02

## 2020-05-14 RX ADMIN — Medication 1 SPRAY(S): at 22:18

## 2020-05-14 RX ADMIN — Medication 1 APPLICATION(S): at 22:18

## 2020-05-14 RX ADMIN — Medication 20 MILLIGRAM(S): at 13:02

## 2020-05-14 RX ADMIN — ATORVASTATIN CALCIUM 10 MILLIGRAM(S): 80 TABLET, FILM COATED ORAL at 22:18

## 2020-05-14 RX ADMIN — Medication 3 MILLIGRAM(S): at 22:18

## 2020-05-14 RX ADMIN — FAMOTIDINE 20 MILLIGRAM(S): 10 INJECTION INTRAVENOUS at 13:02

## 2020-05-14 RX ADMIN — Medication 25 MILLIGRAM(S): at 06:14

## 2020-05-14 RX ADMIN — Medication 50 MILLIGRAM(S): at 06:13

## 2020-05-14 RX ADMIN — Medication 650 MILLIGRAM(S): at 09:00

## 2020-05-14 RX ADMIN — Medication 1 SPRAY(S): at 17:12

## 2020-05-14 RX ADMIN — AMLODIPINE BESYLATE 5 MILLIGRAM(S): 2.5 TABLET ORAL at 07:30

## 2020-05-14 RX ADMIN — INSULIN GLARGINE 20 UNIT(S): 100 INJECTION, SOLUTION SUBCUTANEOUS at 22:18

## 2020-05-14 RX ADMIN — Medication 1 APPLICATION(S): at 07:30

## 2020-05-14 RX ADMIN — METFORMIN HYDROCHLORIDE 1000 MILLIGRAM(S): 850 TABLET ORAL at 17:10

## 2020-05-14 RX ADMIN — Medication 25 MILLIGRAM(S): at 17:10

## 2020-05-14 RX ADMIN — Medication 1 APPLICATION(S): at 22:17

## 2020-05-14 RX ADMIN — POLYETHYLENE GLYCOL 3350 17 GRAM(S): 17 POWDER, FOR SOLUTION ORAL at 13:01

## 2020-05-14 NOTE — PROGRESS NOTE ADULT - SUBJECTIVE AND OBJECTIVE BOX
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 No overnight events reported. pt reports mild pain on right lower extremity, improved from prior. no other complaints.    ALLERGIES:  No Known Allergies    MEDICATIONS  (STANDING):  amLODIPine   Tablet 5 milliGRAM(s) Oral daily  ammonium lactate 12% Lotion 1 Application(s) Topical two times a day  atorvastatin 10 milliGRAM(s) Oral at bedtime  BACItracin   Ointment 1 Application(s) Topical daily  dextrose 5%. 1000 milliLiter(s) (50 mL/Hr) IV Continuous <Continuous>  dextrose 50% Injectable 12.5 Gram(s) IV Push once  dextrose 50% Injectable 25 Gram(s) IV Push once  dextrose 50% Injectable 25 Gram(s) IV Push once  famotidine    Tablet 20 milliGRAM(s) Oral daily  FLUoxetine 20 milliGRAM(s) Oral daily  gabapentin 300 milliGRAM(s) Oral at bedtime  hydrALAZINE 50 milliGRAM(s) Oral daily  insulin glargine Injectable (LANTUS) 20 Unit(s) SubCutaneous at bedtime  insulin lispro (HumaLOG) corrective regimen sliding scale   SubCutaneous three times a day before meals  insulin lispro (HumaLOG) corrective regimen sliding scale   SubCutaneous at bedtime  lidocaine   Patch 1 Patch Transdermal daily  melatonin 3 milliGRAM(s) Oral at bedtime  metFORMIN 1000 milliGRAM(s) Oral two times a day with meals  metoprolol tartrate 25 milliGRAM(s) Oral every 12 hours  polyethylene glycol 3350 17 Gram(s) Oral daily  sodium chloride 0.65% Nasal 1 Spray(s) Both Nostrils four times a day  sodium chloride 0.9% Bolus 1000 milliLiter(s) IV Bolus once    MEDICATIONS  (PRN):  acetaminophen   Tablet .. 650 milliGRAM(s) Oral every 6 hours PRN Temp greater or equal to 38C (100.4F), Mild Pain (1 - 3)  benzonatate 100 milliGRAM(s) Oral every 8 hours PRN Cough  dextrose 40% Gel 15 Gram(s) Oral once PRN Blood Glucose LESS THAN 70 milliGRAM(s)/deciliter  glucagon  Injectable 1 milliGRAM(s) IntraMuscular once PRN Glucose LESS THAN 70 milligrams/deciliter  lactulose Syrup 10 Gram(s) Oral daily PRN constipation  saline laxative (FLEET) Rectal Enema 1 Enema Rectal once PRN if no BM by this evening 5/13/20    Vital Signs Last 24 Hrs  T(F): 97.7 (14 May 2020 07:55), Max: 97.9 (13 May 2020 22:07)  HR: 62 (14 May 2020 07:55) (62 - 88)  BP: 108/58 (14 May 2020 07:55) (108/58 - 160/84)  RR: 16 (14 May 2020 07:55) (16 - 16)  SpO2: 99% (14 May 2020 07:55) (97% - 99%)  I&O's Summary      GENERAL: NAD  HEAD:  Atraumatic, Normocephalic  EYES: EOMI, PERRLA, sclera clear  NECK: Supple  CHEST/LUNG: Clear to auscultation bilaterally; No wheeze  HEART: Regular rate and rhythm; No murmurs, rubs, or gallops  ABDOMEN: Soft, Nontender, Nondistended; Bowel sounds present  EXTREMITIES: No clubbing, cyanosis, or edema  NEUROLOGY: RLE weakness. sensation intact  SKIN: No rashes. + ecchymosis on RLE    LABS:                        10.9   11.72 )-----------( 521      ( 14 May 2020 08:00 )             32.9     05-14    132  |  98  |  14  ----------------------------<  87  4.7   |  25  |  0.82    Ca    9.4      14 May 2020 08:00    TPro  7.7  /  Alb  3.6  /  TBili  0.6  /  DBili  x   /  AST  20  /  ALT  18  /  AlkPhos  62  05-14        eGFR if Non African American: 74 mL/min/1.73M2 (05-14-20 @ 08:00)  eGFR if : 86 mL/min/1.73M2 (05-14-20 @ 08:00)    PT/INR - ( 14 May 2020 08:00 )   PT: 13.3 sec;   INR: 1.18 ratio         PTT - ( 14 May 2020 08:00 )  PTT:33.4 sec      04-17 Chol 103 mg/dL LDL 40 mg/dL HDL 34 mg/dL Trig 146 mg/dL              POCT Blood Glucose.: 105 mg/dL (14 May 2020 07:50)  POCT Blood Glucose.: 165 mg/dL (13 May 2020 22:13)  POCT Blood Glucose.: 113 mg/dL (13 May 2020 17:42)  POCT Blood Glucose.: 130 mg/dL (13 May 2020 12:11)          Urine Culture - 48 Hour Hold (collected 08 May 2020 09:15)  Source: .Urine  Final Report (11 May 2020 08:22):    >100,000 CFU/ml Escherichia coli  Organism: Escherichia coli (11 May 2020 08:22)  Organism: Escherichia coli (11 May 2020 08:22)      -  Amikacin: S <=16      -  Ampicillin: R >16 These ampicillin results predict results for amoxicillin      -  Ampicillin/Sulbactam: R >16/8 Enterobacter, Citrobacter, and Serratia may develop resistance during prolonged therapy (3-4 days)      -  Aztreonam: S <=4      -  Cefazolin: S <=8 (MIC_CL_COM_ENTERIC_CEFAZU) For uncomplicated UTI with K. pneumoniae, E. coli, or P. mirablis: RENNY <=16 is sensitive and RENNY >=32 is resistant. This also predicts results for oral agents cefaclor, cefdinir, cefpodoxime, cefprozil, cefuroxime axetil, cephalexin and locarbef for uncomplicated UTI. Note that some isolates may be susceptible to these agents while testing resistant to cefazolin.      -  Cefepime: S <=4      -  Cefoxitin: S <=8      -  Ceftriaxone: S <=1 Enterobacter, Citrobacter, and Serratia may develop resistance during prolonged therapy      -  Ciprofloxacin: R >2      -  Gentamicin: S <=4      -  Imipenem: S <=1      -  Levofloxacin: R >4      -  Meropenem: S <=1      -  Nitrofurantoin: S <=32 Should not be used to treat pyelonephritis      -  Piperacillin/Tazobactam: S <=16      -  Tigecycline: S <=2      -  Tobramycin: S <=4      -  Trimethoprim/Sulfamethoxazole: R >2/38      Method Type: RENNY      RADIOLOGY & ADDITIONAL TESTS:    Care Discussed with Consultants/Other Providers: Rehab

## 2020-05-14 NOTE — PROGRESS NOTE ADULT - ASSESSMENT
68 yo F HTN, HLD, T2DM, hx CVA, GERD, known RBBB with COVID-19 debility and Left frontal and parietal CVA.    # Left frontal parietal CVA  # SAH  - cont statin  - follow up with neurology regarding timing to resume ASA and DVT ppx  - per neuro: repeat CTH 5/15    # RLE hematoma  - pain controlled and improving  - vascular sx: not likely compartment syndrome  - h/h stable  - off lovenox    # Hyponatremia. r/o SIADH r/o SSRI induced   - send serum osmo and urine osmo and urine Na  - monitor BMP    # COVID-19  - was on empiric therapeutic lovenox for hypercoagulable state due to covid  - saturating 99 % on RA currently  - respiratory status stable, no need to resume full dose lovenox.     # DM II  - FS controlled  - metformin 1000mg q12, Lantus and ISS    # HTN  - BP controlled  - hydralazine, metoprolol, amlodipine     # Mobitz type II AV block s/p PPM, known RBBB    # DVT ppx: ambulation, SCD

## 2020-05-14 NOTE — PROGRESS NOTE ADULT - ASSESSMENT
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 - Holding due to finding of SAH  - cont prozac for motor recovery. increase to 20 mg daily for mood d/o   -neuropsychology supportive counseling  -right SAFO as outpatient. Continue ACe wrapping for foot drop for now  -PRECAUTIONS: airborne, contact, aspiration, cardiac, fall  -Head CT : new Mild subarachnoid hemorrhage in the left frontal lobe stable on repeat imaging 5/10  -repeat CT head  - STABLE NO NEW BLEED  -neuro consult appreciated . Hold off on MRI, Repeat Head CT 5/15 discussed with patient    #right Leg pain: knee anterior and suprapatellar effusion. RESOLVED  -add lidoderm patch for pain daily, ibuprofen dcd  - Doppler negative DVT ,   -  CT RLE with finding of hematoma, vascular consulted would monitor for now, can continue dvt ppx given risk of COVID - appears improved on exam   - LE ANGEL performed - with +moderate PAD  -Xray right knee  negative for acute fracture  Xray right ankle  negative for fracture +calcaneal spur  -Posterior Calf Pain greatly improved today 5/15  -INR 1.18, similar to 4/10 (1.19)    #Acute Hypoxic Respiratory Failure secondary to COVID-19 PNA  Date of COVID testin20  - Hydroxychloroquine not given, pt with prolonged QTc  - EKG - QTc: 500 (), incomplete RBBB  - Incentive spirometry, robitussin DM, ocean spray  -patient tolerating therapies without O2  -COVID reswab NEGATIVE     #UTI  -started on macrobid with bacid until 5/15  -urine cultures - +E coli >100k macrobid sensitive  -mild leukocytosis 11.72 . Afebrile, symptoms improved    #BRADY 2/2 to dehydration vs UTI  encourage po fluids. Monitor  14/0.82 5/15 improved    #HTN  - Lopressor 25 q12, amlodipine 5 qd, hydralazine 50mg qd    #type II Mobitz block, known RBBB  - s/p Medtronic Micra AV+ leadless pacemaker  - outpt EP followup    #T2DM.  - SSI ACHS  -diabetic educator consult appreciated  continue metformin 100 mg BID  -lantus 20 units qhs    #HLD  - cont simvastatin 20 qhs    #insomnia  - melatonin 3    #Pain Mgmt   - Tylenol PRN    #GI/Bowel Mgmt   - Continent  - pepcid    #/Bladder Mgmt   - Continent, PVR negative    #FEN   - Diet - Regular + Thins  [[facility]]  DASH/TLC      #skin:  abrasion back. bacitracin and DSD daily     #- DVT PPX  - on hold for bleed    #Case discussed in IDT rounds :  max assist toilet transfers, stnad-pivot mod assist limited by pain, Mod assist transfers. Deficits i nproblem-solving, comprehension, memory, Has some improvement in insight which may also impact mood  -goals: mod assist bADLs, transfers, ambulation , requires 24 hours supervision  -patient dose not appear to have that level of support at home at this time. Will look for [facility] to continue Ot, PT, SLP to improve funciton once medically stable  COVID reswab     #LABS  CBC BMp   Repeat Head CT 5/15

## 2020-05-14 NOTE — PROGRESS NOTE ADULT - SUBJECTIVE AND OBJECTIVE BOX
Patient is a 69y old  Female who presents with a chief complaint of left frontal parietal CVA with right HP and aphasia, COVID+ 4/8/20 (14 May 2020 10:34)      HPI:  SHIREEN CASH is a 70yo female RH dominant with PMH of HTN, HLD, T2DM, hx CVA, GERD, known RBBB presented to [facility] ED on 4/8/20 with complaints of fever, chills, cough, chest pain, SOB for 5 days. Per patient, her  tested +COVID at an outpatient testing facility a few days prior, patient herself had tested negative at the time. They continued to share the same living space. ED workup showed +COVID and Mobitz type II block. EP was consulted and patient is s/p Medtronic Micra AV+ leadless pacemaker implantation via right femoral vein on 4/10/20. Hydroxychloroquine presumably not given due to prolonged QTc.     On 4/16/20, patient developed acute aphasia and right sided weakness. CT head showed an acute LEFT frontal/parietal CVA. CTA head/neck showed LEFT ICA stenosis but no occlusion. tPA was not administered due to timing and no penumbra. Placed on full dose lovenox as thoguht to be hypercoagulable secondary to COVID. Patient was cleared for regular consistency solids, thin liquids. Transferred to [facility] for acute inpatient rehab services 4/23/20. (23 Apr 2020 14:26)      PAST MEDICAL & SURGICAL HISTORY:  H/O gastroesophageal reflux (GERD)  HTN (hypertension)  DM (diabetes mellitus)  History of benign brain tumor      MEDICATIONS  (STANDING):  amLODIPine   Tablet 5 milliGRAM(s) Oral daily  ammonium lactate 12% Lotion 1 Application(s) Topical two times a day  atorvastatin 10 milliGRAM(s) Oral at bedtime  BACItracin   Ointment 1 Application(s) Topical daily  dextrose 5%. 1000 milliLiter(s) (50 mL/Hr) IV Continuous <Continuous>  dextrose 50% Injectable 12.5 Gram(s) IV Push once  dextrose 50% Injectable 25 Gram(s) IV Push once  dextrose 50% Injectable 25 Gram(s) IV Push once  famotidine    Tablet 20 milliGRAM(s) Oral daily  FLUoxetine 20 milliGRAM(s) Oral daily  gabapentin 300 milliGRAM(s) Oral at bedtime  hydrALAZINE 50 milliGRAM(s) Oral daily  insulin glargine Injectable (LANTUS) 20 Unit(s) SubCutaneous at bedtime  insulin lispro (HumaLOG) corrective regimen sliding scale   SubCutaneous three times a day before meals  insulin lispro (HumaLOG) corrective regimen sliding scale   SubCutaneous at bedtime  lidocaine   Patch 1 Patch Transdermal daily  melatonin 3 milliGRAM(s) Oral at bedtime  metFORMIN 1000 milliGRAM(s) Oral two times a day with meals  metoprolol tartrate 25 milliGRAM(s) Oral every 12 hours  polyethylene glycol 3350 17 Gram(s) Oral daily  sodium chloride 0.65% Nasal 1 Spray(s) Both Nostrils four times a day  sodium chloride 0.9% Bolus 1000 milliLiter(s) IV Bolus once    MEDICATIONS  (PRN):  acetaminophen   Tablet .. 650 milliGRAM(s) Oral every 6 hours PRN Temp greater or equal to 38C (100.4F), Mild Pain (1 - 3)  benzonatate 100 milliGRAM(s) Oral every 8 hours PRN Cough  dextrose 40% Gel 15 Gram(s) Oral once PRN Blood Glucose LESS THAN 70 milliGRAM(s)/deciliter  glucagon  Injectable 1 milliGRAM(s) IntraMuscular once PRN Glucose LESS THAN 70 milligrams/deciliter  lactulose Syrup 10 Gram(s) Oral daily PRN constipation  saline laxative (FLEET) Rectal Enema 1 Enema Rectal once PRN if no BM by this evening 5/13/20      Allergies    No Known Allergies    Intolerances          VITALS  69y  Vital Signs Last 24 Hrs  T(C): 36.5 (14 May 2020 07:55), Max: 36.6 (13 May 2020 22:07)  T(F): 97.7 (14 May 2020 07:55), Max: 97.9 (13 May 2020 22:07)  HR: 62 (14 May 2020 07:55) (62 - 88)  BP: 108/58 (14 May 2020 07:55) (108/58 - 160/84)  BP(mean): --  RR: 16 (14 May 2020 07:55) (16 - 16)  SpO2: 99% (14 May 2020 07:55) (97% - 99%)  Daily     Daily         RECENT LABS:                          10.9   11.72 )-----------( 521      ( 14 May 2020 08:00 )             32.9     05-14    132<L>  |  98  |  14  ----------------------------<  87  4.7   |  25  |  0.82    Ca    9.4      14 May 2020 08:00    TPro  7.7  /  Alb  3.6  /  TBili  0.6  /  DBili  x   /  AST  20  /  ALT  18  /  AlkPhos  62  05-14    LIVER FUNCTIONS - ( 14 May 2020 08:00 )  Alb: 3.6 g/dL / Pro: 7.7 g/dL / ALK PHOS: 62 U/L / ALT: 18 U/L / AST: 20 U/L / GGT: x           PT/INR - ( 14 May 2020 08:00 )   PT: 13.3 sec;   INR: 1.18 ratio         PTT - ( 14 May 2020 08:00 )  PTT:33.4 sec    COVID swab negative5/13    CAPILLARY BLOOD GLUCOSE      POCT Blood Glucose.: 160 mg/dL (14 May 2020 12:23)  POCT Blood Glucose.: 105 mg/dL (14 May 2020 07:50)  POCT Blood Glucose.: 165 mg/dL (13 May 2020 22:13)  POCT Blood Glucose.: 113 mg/dL (13 May 2020 17:42)            Review of Systems:   · Additional ROS	Patient seen with assistance of language line  in [place] irvin at 9:45 AM.   Patient looks much more comfortable. She nods head yes when asked if pain is improved; performed with OT in AM and did more standing, more motivated. Affect is slightly brighter  No N/V. denies constipation, had BM, no dysuria. Possibility of additional rehab at [facility] discussed	    Physical Exam:   · Constitutional	detailed exam	  · Constitutional Comments	alert, was pleasant and cooperative NAD, not in pain. RLE ace wrap to knee and ankle	  · Respiratory	detailed exam	  · Respiratory Details	good air movement; respirations non-labored; clear to auscultation bilaterally	  · Cardiovascular	detailed exam	  · Cardiovascular Details	regular rate and rhythm	  · Gastrointestinal	detailed exam	  · GI Normal	soft; nontender; bowel sounds normal	  · Extremities	detailed exam	  · Extremities Comments	calf swelling right LE improved improving ecchymoses lateral lower leg as well as medial  no prxoimal calf TTP mild distal and med malleolar TTP but not grimacing

## 2020-05-15 LAB
ANION GAP SERPL CALC-SCNC: 12 MMOL/L — SIGNIFICANT CHANGE UP (ref 5–17)
BUN SERPL-MCNC: 19 MG/DL — SIGNIFICANT CHANGE UP (ref 7–23)
CALCIUM SERPL-MCNC: 9.1 MG/DL — SIGNIFICANT CHANGE UP (ref 8.4–10.5)
CHLORIDE SERPL-SCNC: 100 MMOL/L — SIGNIFICANT CHANGE UP (ref 96–108)
CO2 SERPL-SCNC: 22 MMOL/L — SIGNIFICANT CHANGE UP (ref 22–31)
CREAT SERPL-MCNC: 0.88 MG/DL — SIGNIFICANT CHANGE UP (ref 0.5–1.3)
GLUCOSE SERPL-MCNC: 102 MG/DL — HIGH (ref 70–99)
POTASSIUM SERPL-MCNC: 4.2 MMOL/L — SIGNIFICANT CHANGE UP (ref 3.5–5.3)
POTASSIUM SERPL-SCNC: 4.2 MMOL/L — SIGNIFICANT CHANGE UP (ref 3.5–5.3)
SODIUM SERPL-SCNC: 134 MMOL/L — LOW (ref 135–145)
SODIUM UR-SCNC: <20 MMOL/L — SIGNIFICANT CHANGE UP

## 2020-05-15 PROCEDURE — 99233 SBSQ HOSP IP/OBS HIGH 50: CPT

## 2020-05-15 PROCEDURE — 99233 SBSQ HOSP IP/OBS HIGH 50: CPT | Mod: GC

## 2020-05-15 PROCEDURE — 90832 PSYTX W PT 30 MINUTES: CPT

## 2020-05-15 PROCEDURE — 70450 CT HEAD/BRAIN W/O DYE: CPT | Mod: 26

## 2020-05-15 RX ADMIN — METFORMIN HYDROCHLORIDE 1000 MILLIGRAM(S): 850 TABLET ORAL at 07:34

## 2020-05-15 RX ADMIN — METFORMIN HYDROCHLORIDE 1000 MILLIGRAM(S): 850 TABLET ORAL at 17:26

## 2020-05-15 RX ADMIN — Medication 1 SPRAY(S): at 05:11

## 2020-05-15 RX ADMIN — Medication 1 SPRAY(S): at 19:10

## 2020-05-15 RX ADMIN — Medication 100 MILLIGRAM(S): at 08:26

## 2020-05-15 RX ADMIN — Medication 3 MILLIGRAM(S): at 22:17

## 2020-05-15 RX ADMIN — Medication 1 SPRAY(S): at 23:31

## 2020-05-15 RX ADMIN — GABAPENTIN 300 MILLIGRAM(S): 400 CAPSULE ORAL at 22:56

## 2020-05-15 RX ADMIN — Medication 50 MILLIGRAM(S): at 05:12

## 2020-05-15 RX ADMIN — LIDOCAINE 1 PATCH: 4 CREAM TOPICAL at 19:16

## 2020-05-15 RX ADMIN — Medication 1 SPRAY(S): at 12:08

## 2020-05-15 RX ADMIN — LIDOCAINE 1 PATCH: 4 CREAM TOPICAL at 12:07

## 2020-05-15 RX ADMIN — AMLODIPINE BESYLATE 5 MILLIGRAM(S): 2.5 TABLET ORAL at 05:11

## 2020-05-15 RX ADMIN — Medication 650 MILLIGRAM(S): at 14:39

## 2020-05-15 RX ADMIN — Medication 1 APPLICATION(S): at 22:15

## 2020-05-15 RX ADMIN — FAMOTIDINE 20 MILLIGRAM(S): 10 INJECTION INTRAVENOUS at 12:06

## 2020-05-15 RX ADMIN — Medication 25 MILLIGRAM(S): at 05:11

## 2020-05-15 RX ADMIN — Medication 1 APPLICATION(S): at 05:11

## 2020-05-15 RX ADMIN — LIDOCAINE 1 PATCH: 4 CREAM TOPICAL at 23:31

## 2020-05-15 RX ADMIN — Medication 20 MILLIGRAM(S): at 12:07

## 2020-05-15 RX ADMIN — Medication 1 APPLICATION(S): at 22:16

## 2020-05-15 RX ADMIN — Medication 650 MILLIGRAM(S): at 08:27

## 2020-05-15 RX ADMIN — INSULIN GLARGINE 20 UNIT(S): 100 INJECTION, SOLUTION SUBCUTANEOUS at 22:16

## 2020-05-15 RX ADMIN — ATORVASTATIN CALCIUM 10 MILLIGRAM(S): 80 TABLET, FILM COATED ORAL at 22:16

## 2020-05-15 RX ADMIN — Medication 100 MILLIGRAM(S): at 17:25

## 2020-05-15 RX ADMIN — Medication 25 MILLIGRAM(S): at 17:26

## 2020-05-15 NOTE — PROGRESS NOTE ADULT - SUBJECTIVE AND OBJECTIVE BOX
Patient is a 69y old  Female who presents with a chief complaint of left frontal parietal CVA with right HP and aphasia, COVID+ 4/8/20 (14 May 2020 13:44)      HPI:  SHIREEN CASH is a 68yo female RH dominant with PMH of HTN, HLD, T2DM, hx CVA, GERD, known RBBB presented to [facility] ED on 4/8/20 with complaints of fever, chills, cough, chest pain, SOB for 5 days. Per patient, her  tested +COVID at an outpatient testing facility a few days prior, patient herself had tested negative at the time. They continued to share the same living space. ED workup showed +COVID and Mobitz type II block. EP was consulted and patient is s/p Medtronic Micra AV+ leadless pacemaker implantation via right femoral vein on 4/10/20. Hydroxychloroquine presumably not given due to prolonged QTc.     On 4/16/20, patient developed acute aphasia and right sided weakness. CT head showed an acute LEFT frontal/parietal CVA. CTA head/neck showed LEFT ICA stenosis but no occlusion. tPA was not administered due to timing and no penumbra. Placed on full dose lovenox as thoguht to be hypercoagulable secondary to COVID. Patient was cleared for regular consistency solids, thin liquids. Transferred to [facility] for acute inpatient rehab services 4/23/20. (23 Apr 2020 14:26)      PAST MEDICAL & SURGICAL HISTORY:  H/O gastroesophageal reflux (GERD)  HTN (hypertension)  DM (diabetes mellitus)  History of benign brain tumor      MEDICATIONS  (STANDING):  amLODIPine   Tablet 5 milliGRAM(s) Oral daily  ammonium lactate 12% Lotion 1 Application(s) Topical two times a day  atorvastatin 10 milliGRAM(s) Oral at bedtime  BACItracin   Ointment 1 Application(s) Topical daily  dextrose 5%. 1000 milliLiter(s) (50 mL/Hr) IV Continuous <Continuous>  dextrose 50% Injectable 12.5 Gram(s) IV Push once  dextrose 50% Injectable 25 Gram(s) IV Push once  dextrose 50% Injectable 25 Gram(s) IV Push once  famotidine    Tablet 20 milliGRAM(s) Oral daily  FLUoxetine 20 milliGRAM(s) Oral daily  gabapentin 300 milliGRAM(s) Oral at bedtime  hydrALAZINE 50 milliGRAM(s) Oral daily  insulin glargine Injectable (LANTUS) 20 Unit(s) SubCutaneous at bedtime  insulin lispro (HumaLOG) corrective regimen sliding scale   SubCutaneous three times a day before meals  insulin lispro (HumaLOG) corrective regimen sliding scale   SubCutaneous at bedtime  lidocaine   Patch 1 Patch Transdermal daily  melatonin 3 milliGRAM(s) Oral at bedtime  metFORMIN 1000 milliGRAM(s) Oral two times a day with meals  metoprolol tartrate 25 milliGRAM(s) Oral every 12 hours  polyethylene glycol 3350 17 Gram(s) Oral daily  sodium chloride 0.65% Nasal 1 Spray(s) Both Nostrils four times a day  sodium chloride 0.9% Bolus 1000 milliLiter(s) IV Bolus once    MEDICATIONS  (PRN):  acetaminophen   Tablet .. 650 milliGRAM(s) Oral every 6 hours PRN Temp greater or equal to 38C (100.4F), Mild Pain (1 - 3)  benzonatate 100 milliGRAM(s) Oral every 8 hours PRN Cough  dextrose 40% Gel 15 Gram(s) Oral once PRN Blood Glucose LESS THAN 70 milliGRAM(s)/deciliter  glucagon  Injectable 1 milliGRAM(s) IntraMuscular once PRN Glucose LESS THAN 70 milligrams/deciliter  lactulose Syrup 10 Gram(s) Oral daily PRN constipation  saline laxative (FLEET) Rectal Enema 1 Enema Rectal once PRN if no BM by this evening 5/13/20      Allergies    No Known Allergies    Intolerances          VITALS  69y  Vital Signs Last 24 Hrs  T(C): 36.4 (15 May 2020 07:38), Max: 37.1 (14 May 2020 22:16)  T(F): 97.6 (15 May 2020 07:38), Max: 98.7 (14 May 2020 22:16)  HR: 73 (15 May 2020 07:38) (73 - 83)  BP: 117/69 (15 May 2020 07:38) (117/69 - 149/85)  BP(mean): --  RR: 16 (15 May 2020 07:38) (16 - 17)  SpO2: 96% (15 May 2020 07:38) (96% - 98%)  Daily     Daily         RECENT LABS:                          10.9   11.72 )-----------( 521      ( 14 May 2020 08:00 )             32.9     05-15    134<L>  |  100  |  19  ----------------------------<  102<H>  4.2   |  22  |  0.88    Ca    9.1      15 May 2020 06:30    TPro  7.7  /  Alb  3.6  /  TBili  0.6  /  DBili  x   /  AST  20  /  ALT  18  /  AlkPhos  62  05-14    LIVER FUNCTIONS - ( 14 May 2020 08:00 )  Alb: 3.6 g/dL / Pro: 7.7 g/dL / ALK PHOS: 62 U/L / ALT: 18 U/L / AST: 20 U/L / GGT: x           PT/INR - ( 14 May 2020 08:00 )   PT: 13.3 sec;   INR: 1.18 ratio         PTT - ( 14 May 2020 08:00 )  PTT:33.4 sec        CAPILLARY BLOOD GLUCOSE      POCT Blood Glucose.: 141 mg/dL (15 May 2020 12:04)  POCT Blood Glucose.: 137 mg/dL (15 May 2020 07:33)  POCT Blood Glucose.: 117 mg/dL (14 May 2020 22:13)  POCT Blood Glucose.: 111 mg/dL (14 May 2020 17:09)  POCT Blood Glucose.: 160 mg/dL (14 May 2020 12:23)          Review of Systems:   · Additional ROS	Patient appears in better spirtis. FS now controlled, Voiding well without dysuria. Last BM 2 days ago 5/13  No fever. Painin LE improved, participating more in therapy. no cough or SOB	    Physical Exam:   · Constitutional	detailed exam	  · Constitutional Comments	alert, was pleasant and cooperative NAD, aphasia expressive >receptive. Occasional errors in verbal yes/no but corrects with head nods/	  · Respiratory	detailed exam	  · Respiratory Details	good air movement; respirations non-labored; clear to auscultation bilaterally	  · Cardiovascular	detailed exam	  · Cardiovascular Details	regular rate and rhythm	  · Gastrointestinal	detailed exam	  · GI Normal	soft; nontender; bowel sounds normal	  · Extremities	detailed exam	  · Extremities Comments	improved swelling RLE no redness or warmth healing ecchymoses right lateral and medial lower leg no ankle swelling mild TTP compression distally

## 2020-05-15 NOTE — PROGRESS NOTE ADULT - SUBJECTIVE AND OBJECTIVE BOX
Patient is a 69 year old woman admitted 4/23/20 to [facility].  She had been hospitalized on 4/8/20 with Covid 19 (+). She was found to have a Mobitz type II block and a pacemaker was placed. On 4/16 she was found aphasic with right-sided hemiparesis. A CT head noted a left frontoparietal infarct. A CTA Head and Neck noted Left ICA stenosis without occlusion. She was placed on ASA.  She was also  placed on full dose of lovenox with consideration of hypercoagulable state and COVID -19 (+). Today noted extensive ecchymosis involving the right posterior calf and patient complains of pain involving the same area. She notes mild HA generalized . She denies any change with difficulty with speech. She states no change with right arm weakness and inability to move right leg. She denies other neurological complaints. She denies other complaints. Today, Saturday, the patient states the right calf pain is less. She continues with mild HA and no change with neurological complaints. Today, Friday,  she denies HA. She denies any change with her neurological complaints.    PMH: As above.          HTN          HLD         DM             SH: No allergy    Exam: Awake, alert, speaks to this physician in Danish           Speech- decreased fluency, responds with 1-2 and occasionally 3 words in response to questions and searches for words, follows commands promptly, no dysarthria           Pupils 2.5mm, reactive, RHH              Motor tone and strength-RUE-4/5      LUE 5/5                                                 RLE-0/5,      LLE 5/5    < from: CT Head No Cont (05.15.20 @ 09:33) >    FINDINGS:   05/12/2020 available for review.    The brain demonstrates chronic infarctions in the LEFT frontal and parietal lobes with unchanged focus of subarachnoid hemorrhage in the LEFT frontal infarction. Minimal subarachnoid hemorrhage also seen within the RIGHT lateral perimesencephalic cistern. Old infarction again noted in the RIGHT cerebellum. Stable 2.5 cm arachnoid cyst in the anterior LEFT middle cranial fossa.     No mass effect is found in the brain.      The ventricles, sulci and basal cisterns appear unremarkable.    The orbits are unremarkable.  The paranasal sinuses are clear.  The nasal cavity appears intact.  The nasopharynx is symmetric.  The central skull base, petrous temporal bones and calvarium remain intact.      IMPRESSION:   Chronic infarctions in the LEFT frontal and parietal lobes with unchanged focus of subarachnoid hemorrhage in the LEFT frontal infarction. Minimal subarachnoid hemorrhage also seen within the RIGHT lateral perimesencephalic cistern. Old infarction again noted in the RIGHT cerebellum. Stable 2.5 cm arachnoid cyst in the anterior LEFT middle cranial fossa.              < from: CT Head No Cont (05.12.20 @ 10:58) >    FINDINGS:      As compared to prior study, there is no significant interval change. Again there are ischemic changes in the left hemisphere with a now mature appearance of the left the MCA and OLU infarcts are present on 4/16/2020. Again noted is low-level blood in the left the anterior paracentral region. There is no evidence of rebleeding or new hemorrhage.    Also noted are involutional changes in both hemispheres with volume loss.    Ventricles are midline    Chronic ischemic changes are again noted in the cerebellum.    A left temporal fossa arachnoid cyst is again noted.    IMPRESSION:    1)  stable follow-up study with no significant change.  2)  follow-up MR imaging recommended for further assessment.                < end of copied text >                 < from: CT Head No Cont (05.10.20 @ 09:18) >    TECHNIQUE: Noncontrast CT of the head. Multiplanar reformations are submitted.    FINDINGS: Mild subarachnoid hemorrhage in the left frontal lobe, unchanged. Stable chronic left MCA territory infarcts. Stable chronic right inferior cerebellar hemisphere infarcts. Consider MRI as clinically warranted. Stable left frontal lobe arachnoid cyst.    There is periventricular and subcortical white matter hypodensity without mass effect, nonspecific, likely representing mild chronic microvascular ischemic changes. There is no compelling evidence for an acute transcortical infarction. There is no evidence of mass, mass effect, midline shift or extra-axial fluid collection. The lateral ventricles and cortical sulci are age-appropriate in size and configuration. The orbits, mastoid air cells and visualized paranasal sinuses are unremarkable. The calvariumis intact.    IMPRESSION: Mild subarachnoid hemorrhage in the left frontal lobe, unchanged. No hydrocephalus. No new hemorrhage.      < from: CT Head No Cont (05.09.20 @ 17:50) >    FINDINGS: Small arachnoid cyst adjacent to the left sylvian fissure on image 8, series 2 measures 2.2 x 2.2 cm, unchanged in size but demonstrates subtle increased density within it.     Subacute/chronic left frontal infarct with mild subarachnoid hemorrhage is demonstrated on image 15, series 2.    Evolving subacute/chronic left parietal lobe infarct. Chronic right inferior cerebellar hemisphere infarct.    There is periventricular and subcortical white matter hypodensity without mass effect, nonspecific, likely representing mild chronic microvascular ischemic changes. There is no compelling evidence for an acute transcortical infarction. There is no other evidence of mass, mass effect, midline shift or extra-axial fluid collection. The lateral ventricles and cortical sulci are age-appropriate in size and configuration. The orbits, mastoid air cells and visualized paranasal sinuses are unremarkable. The calvarium is intact.    IMPRESSION:  New mild subarachnoid hemorrhage in the paramedian left frontal lobe.Evolving subacute/chronic left MCA territory infarcts.. Findings discussed with Dr. Cross.          <

## 2020-05-15 NOTE — PROGRESS NOTE ADULT - ASSESSMENT
Patient is a 69 year old woman admitted 4/23/20 to [facility].  She had been hospitalized on 4/8/20 with Covid 19 (+). She was found to have a Mobitz type II block and a pacemaker was placed. On 4/16 she was found aphasic with right-sided hemiparesis. A CT head noted a left frontoparietal infarct. A CTA Head and Neck noted Left ICA stenosis without occlusion. She was placed on ASA.  She was also  placed on full dose of lovenox with consideration of hypercoagulable state and COVID -19 (+). Today noted extensive ecchymosis involving the right posterior calf and patient complains of pain involving the same area. She notes mild HA generalized . She denies any change with difficulty with speech. She states no change with right arm weakness and inability to move right leg. She denies other neurological complaints. She denies other complaints. Neurological exam as above. Today, Saturday, notes less calf pain. She continues with mild HA and no change with neurological complaints. Today, Friday, she denies any HA. She notes no change with neurological complaints.      1) CT head 5/9 as  new mild SAH left frontal. Evolving subacute left MCA infarcts, chronic right inf cerebellar hemispheric infarct, small arachnoid cyst adjacent to left sylvian fissure.  2) CT Head 5/10  mild SAH left frontal unchanged from CT Head 5/9.  No new hemorrhage. Stable chronic Left MCA infarcts. Stable chronic right inf cerebellar infarct. Stable chronic small subarachnoid cyst.  3) Agree with ASA 81 mg daily discontinued and Lovenox discontinued.  4) CT head 5/12  again noted low level blood left anterior paracentral region, no evidence of new hemorrhage. Stable follow-up with no significant change. Ischemic changes with now mature appearance of Left MCA and OLU infarcts seen 4/16/20. follow-up MRI imaging recommended for further assessment.  5)  CT head 5/15, as above chronic infarction left frontal and parietal lobes with unchanged focus of subarachnoid hemorrhage in the left frontal infarction. Minimal subarachnoid hemorrhage within the right lateral perimesencephalic cistern. Old infarct again noted right cerebellum. Stable arachnoid cyst left middle cranial fossa. Discussed with the radiologist her reading of the minimal SAH on the right on this study. She advises that not seen on the prior CT Head 5/12/20.  6) Would continue to follow with CT head and would obtain CT Head on 5/20/20, Wednesday.

## 2020-05-15 NOTE — PROGRESS NOTE ADULT - ASSESSMENT
Ms Ko is a pleasant 69 year-old female with hx of HTN, HLD, T2DM, CVA, GERD, RBBB with COVID-19 debility and Left frontal and parietal CVA. Currently admitted to [facility] for acute rehabilitation of her functional deficits. Repeat CT head this morning showing new small areas of bleeding. Neurology to comment. Hematology to comment on coagulation/bleeding disorder given persistent bleeding despite being off anticoagulation.     Neurology  Left frontal parietal CVA and SAH  - cont statin  - follow up with neurology regarding timing to resume ASA and DVT ppx  - repeat head CT today showing new area of small SAH bleed  - neurology to comment    Pulmonary/ID  COVID-19  - was on empiric therapeutic lovenox for hypercoagulable state due to covid  - saturating 99 % on RA currently  - respiratory status stable, no need to resume full dose lovenox.     Cardiovascular  Hypertension  - BP controlled  - hydralazine, metoprolol, amlodipine   Mobitz type II AV block   - s/p PPM, ecg now with RBBB from pacing  RLE hematoma  - pain controlled and improving  - vascular sx: not likely compartment syndrome  - h/h stable  - off lovenox given above    Renal  Hyponatremia.   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   - fluid restrict to 1.5L Daiy  - monitor bmp daily     Endocrine  DM II  - FS controlled, goal -180 while hospitalized  - metformin 1000mg q12, Lantus and ISS    Hematology  Leukocytosis  - mild without signs of infection  - continue to monitor  Normocytic Anemia  - likely due to blood loss given hematoma  - repeat CBC tomorrow  Bleeding  - given continued bleeding as evidence on CT Head as well as hematoma formation would obtain hematology consultation    Diet: per primary  Pain control: per primary  DVT ppx: ambulation, SCD  Dispo: per primary  Case discussed with primary team  If a clinical question should arise regarding this patient, please do not hesitate to contact me at [phone] until 5pm on 5/15/2020

## 2020-05-15 NOTE — CHART NOTE - NSCHARTNOTEFT_GEN_A_CORE
NUTRITION FOLLOW UP    SOURCE: Patient [ )   Family [ ]    Medical Record (X), EMR, RN, minimal visitation with +COVID 19    DIET: Consistent carbohydrate, DASH/TLC    Pt noted with a decrease in appetite as of late: intake varies from %. Pt has not been feeling well enough to eat. Will trial oral nutrition supplements (Glucerna BID). CDE following- glycemic control now improved (<180mg/dl). Noted pt now with plan to d/c to [facility].     CURRENT WEIGHT:   (4/25) 165.5lbs   (5/10) 169.5lbs    PERTINENT MEDS:   Pertinent Medications: MEDICATIONS  (STANDING):  amLODIPine   Tablet 5 milliGRAM(s) Oral daily  ammonium lactate 12% Lotion 1 Application(s) Topical two times a day  atorvastatin 10 milliGRAM(s) Oral at bedtime  BACItracin   Ointment 1 Application(s) Topical daily  dextrose 5%. 1000 milliLiter(s) (50 mL/Hr) IV Continuous <Continuous>  dextrose 50% Injectable 12.5 Gram(s) IV Push once  dextrose 50% Injectable 25 Gram(s) IV Push once  dextrose 50% Injectable 25 Gram(s) IV Push once  famotidine    Tablet 20 milliGRAM(s) Oral daily  FLUoxetine 20 milliGRAM(s) Oral daily  gabapentin 300 milliGRAM(s) Oral at bedtime  hydrALAZINE 50 milliGRAM(s) Oral daily  insulin glargine Injectable (LANTUS) 20 Unit(s) SubCutaneous at bedtime  insulin lispro (HumaLOG) corrective regimen sliding scale   SubCutaneous three times a day before meals  insulin lispro (HumaLOG) corrective regimen sliding scale   SubCutaneous at bedtime  lidocaine   Patch 1 Patch Transdermal daily  melatonin 3 milliGRAM(s) Oral at bedtime  metFORMIN 1000 milliGRAM(s) Oral two times a day with meals  metoprolol tartrate 25 milliGRAM(s) Oral every 12 hours  polyethylene glycol 3350 17 Gram(s) Oral daily  sodium chloride 0.65% Nasal 1 Spray(s) Both Nostrils four times a day  sodium chloride 0.9% Bolus 1000 milliLiter(s) IV Bolus once    MEDICATIONS  (PRN):  acetaminophen   Tablet .. 650 milliGRAM(s) Oral every 6 hours PRN Temp greater or equal to 38C (100.4F), Mild Pain (1 - 3)  benzonatate 100 milliGRAM(s) Oral every 8 hours PRN Cough  dextrose 40% Gel 15 Gram(s) Oral once PRN Blood Glucose LESS THAN 70 milliGRAM(s)/deciliter  glucagon  Injectable 1 milliGRAM(s) IntraMuscular once PRN Glucose LESS THAN 70 milligrams/deciliter  lactulose Syrup 10 Gram(s) Oral daily PRN constipation  saline laxative (FLEET) Rectal Enema 1 Enema Rectal once PRN if no BM by this evening 5/13/20      PERTINENT LABS:  05-15 Na134 mmol/L<L> Glu 102 mg/dL<H> K+ 4.2 mmol/L Cr  0.88 mg/dL BUN 19 mg/dL 05-14 Alb 3.6 g/dL 04-17 Chol 103 mg/dL<L> LDL 40 mg/dL HDL 34 mg/dL<L> Trig 146 mg/dL    POCT (5/14) 105-160, (5/15) 137 mg/dl     SKIN:  no pressure ulcers, abrasion on lower back  EDEMA: none  LAST BM: 5/15    ESTIMATED NEEDS:   [X] no change since previous assessment  [ ] recalculated:     PREVIOUS NUTRITION DIAGNOSIS:    1. Inadequate oral intake: variable appetite noted. Pt consuming % of meals.   2. Altered nutrition related lab values- CDE following. Noted with improved glycemic control now.     NUTRITION DIAGNOSIS is :  (X)  Ongoing      NEW NUTRITION DIAGNOSIS: N/A    NUTRITION RECOMMENDATIONS:   1. Add Glucerna Shake BID   2. Obtain and honor food preferences daily as able via telephone   3. CDE following for medical management of DMII - noted plan for d/c changed to [facility]  4. Weekly weights    MONITORING AND EVALUATION:   1. Tolerance to diet prescription   2. PO intake  3. Weights  4. Labs  5. Follow Up per protocol     RD to remain available   Eve Little RDN   Pager #[phone].

## 2020-05-15 NOTE — PROGRESS NOTE ADULT - ASSESSMENT
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 - Holding due to finding of SAH. HEAD CT 5/15 for determination start AC  - cont prozac for motor recovery. Increase to 20 mg daily for mood d/o , tolerating, improved  -neuropsychology supportive counseling  -right SAFO as outpatient. Continue ACe wrapping for foot drop for now  -PRECAUTIONS: airborne, contact, aspiration, cardiac, fall  -Head CT : new Mild subarachnoid hemorrhage in the left frontal lobe stable on repeat imaging 5/10  -repeat CT head  - STABLE NO NEW BLEED  -neuro consult appreciated . Hold off on MRI,  - Repeat Head CT 5/15 pending    #RLE pain improed  - Doppler negative DVT ,   -  CT RLE with finding of hematoma, vascular consulted would monitor for now, can continue dvt ppx given risk of COVID - appears improved on exam   - LE ANGEL performed - with +moderate PAD  -Xray right knee  negative for acute fracture  Xray right ankle  negative for fracture +calcaneal spur  -INR 1.18, similar to 4/10 (1.19)    #Acute Hypoxic Respiratory Failure secondary to COVID-19 PNA  Date of COVID testin20  - Hydroxychloroquine not given, pt with prolonged QTc  - EKG - QTc: 500 (), incomplete RBBB  - Incentive spirometry, robitussin DM, ocean spray  -patient tolerating therapies without O2  -COVID reswab NEGATIVE     #UTI  -completing macrobid 5/15  -urine cultures - +E coli >100k macrobid sensitive  -mild leukocytosis 11.72 . Afebrile, symptoms improved  CBC     #BRADY 2/2 to dehydration vs UTI  encourage po fluids. Monitor  14/0.82  imrpoved  BMP     #HTN  - Lopressor 25 q12, amlodipine 5 qd, hydralazine 50mg qd  controlled 5/15 117/69 - 149/85    #type II Mobitz block, known RBBB  - s/p Medtronic Micra AV+ leadless pacemaker  - outpt EP followup    #T2DM.  - SSI ACHS  -diabetic educator consult appreciated  continue metformin 100 mg BID  -lantus 20 units qhs  controlled 5/15    #HLD  - cont simvastatin 20 qhs    #insomnia  - melatonin 3    #Pain Mgmt   - Tylenol PRN    #GI/Bowel Mgmt   - Continent  - pepcid    #/Bladder Mgmt   - Continent, PVR negative    #FEN   - Diet - Regular + Thins  [CCHO]  DASH/TLC      #skin:  abrasion back. bacitracin and DSD daily     #- DVT PPX  - on hold for bleed    #Case discussed in IDT rounds :  max assist toilet transfers, stnad-pivot mod assist limited by pain, Mod assist transfers. Deficits i nproblem-solving, comprehension, memory, Has some improvement in insight which may also impact mood  -goals: mod assist bADLs, transfers, ambulation , requires 24 hours supervision  -patient dose not appear to have that level of support at home at this time. 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 - Holding due to finding of SAH. HEAD CT 5/15 for determination start AC  - cont prozac for motor recovery. Increase to 20 mg daily for mood d/o , tolerating, improved  -neuropsychology supportive counseling  -right SAFO as outpatient. Continue ACe wrapping for foot drop for now  -PRECAUTIONS: airborne, contact, aspiration, cardiac, fall  -Head CT : new Mild subarachnoid hemorrhage in the left frontal lobe stable on repeat imaging 5/10  -repeat CT head  - STABLE NO NEW BLEED  -neuro consult appreciated . Hold off on MRI,  - Repeat Head CT 5/15 pending    #RLE pain improed  - Doppler negative DVT ,   -  CT RLE with finding of hematoma, vascular consulted would monitor for now, can continue dvt ppx given risk of COVID - appears improved on exam   - LE ANGEL performed - with +moderate PAD  -Xray right knee  negative for acute fracture  Xray right ankle  negative for fracture +calcaneal spur  -INR 1.18, similar to 4/10 (1.19)    #Acute Hypoxic Respiratory Failure secondary to COVID-19 PNA  Date of COVID testin20  - Hydroxychloroquine not given, pt with prolonged QTc  - EKG - QTc: 500 (), incomplete RBBB  - Incentive spirometry, robitussin DM, ocean spray  -patient tolerating therapies without O2  -COVID reswab NEGATIVE     #UTI  -completing macrobid 5/15  -urine cultures - +E coli >100k macrobid sensitive  -mild leukocytosis 11.72 . Afebrile, symptoms improved  CBC     #BRADY 2/2 to dehydration vs UTI  encourage po fluids. Monitor  14/0.82  imrpoved  BMP     #HTN  - Lopressor 25 q12, amlodipine 5 qd, hydralazine 50mg qd  controlled 5/15 117/69 - 149/85    #type II Mobitz block, known RBBB  - s/p Medtronic Micra AV+ leadless pacemaker  - outpt EP followup    #T2DM.  - SSI ACHS  -diabetic educator consult appreciated  continue metformin 100 mg BID  -lantus 20 units qhs  controlled 5/15    #HLD  - cont simvastatin 20 qhs    #insomnia  - melatonin 3    #Pain Mgmt   - Tylenol PRN    #GI/Bowel Mgmt   - Continent  - pepcid    #/Bladder Mgmt   - Continent, PVR negative    #FEN   - Diet - Regular + Thins  [CCHO]  DASH/TLC      #skin:  abrasion back. bacitracin and DSD daily     #- DVT PPX  - on hold for bleed    #Case discussed in IDT rounds :  max assist toilet transfers, stnad-pivot mod assist limited by pain, Mod assist transfers. Deficits i nproblem-solving, comprehension, memory, Has some improvement in insight which may also impact mood  -goals: mod assist bADLs, transfers, ambulation , requires 24 hours supervision  -patient dose not appear to have that level of support at home at this time. Will look for [facility] to continue Ot, PT, SLP to improve funciton once medically stable  COVID reswab     #LABS  CBC BMp   Repeat Head CT 5/15      addendum:    EXAM:  CT BRAIN      PROCEDURE DATE:  05/15/2020        INTERPRETATION:      CT head without IV contrast        CLINICAL INFORMATION:  Follow-up subarachnoid   Intracranial hemorrhage.    TECHNIQUE: Contiguous axial 5 mm sections were obtained through the head. Sagittal and coronal 2-D reformatted images were also obtained.   This scan was performed using automatic exposure control (radiation dose reduction software) to obtain a diagnostic image quality scan with patient dose as low as reasonably achievable.     FINDINGS:   2020 available for review.    The brain demonstrates chronic infarctions in the LEFT frontal and parietal lobes with unchanged focus of subarachnoid hemorrhage in the LEFT frontal infarction. Minimal subarachnoid hemorrhage also seen within the RIGHT lateral perimesencephalic cistern. Old infarction again noted in the RIGHT cerebellum. Stable 2.5 cm arachnoid cyst in the anterior LEFT middle cranial fossa.     No mass effect is found in the brain.      The ventricles, sulci and basal cisterns appear unremarkable.    The orbits are unremarkable.  The paranasal sinuses are clear.  The nasal cavity appears intact.  The nasopharynx is symmetric.  The central skull base, petrous temporal bones and calvarium remain intact.      IMPRESSION:   Chronic infarctions in the LEFT frontal and parietal lobes with unchanged focus of subarachnoid hemorrhage in the LEFT frontal infarction. Minimal subarachnoid hemorrhage also seen within the RIGHT lateral perimesencephalic cistern. Old infarction again noted in the RIGHT cerebellum. Stable 2.5 cm arachnoid cyst in the anterior LEFT middle cranial fossa.      -continue to hold lovenox and ASA  -will request heme consult as patient with areas of bleeding on brain as well as RLE               BRYN CRUZ M.D., ATTENDING RADIOLOGIST  This document has been electronically signed. 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 - Holding due to finding of SAH. HEAD CT 5/15 for determination start AC  - cont prozac for motor recovery. Increase to 20 mg daily for mood d/o , tolerating, improved  -neuropsychology supportive counseling  -right SAFO as outpatient. Continue ACe wrapping for foot drop for now  -PRECAUTIONS: airborne, contact, aspiration, cardiac, fall  -Head CT : new Mild subarachnoid hemorrhage in the left frontal lobe stable on repeat imaging 5/10  -repeat CT head  - STABLE NO NEW BLEED  -neuro consult appreciated . Hold off on MRI,  - Repeat Head CT 5/15 pending    #RLE pain improed  - Doppler negative DVT ,   -  CT RLE with finding of hematoma, vascular consulted would monitor for now, can continue dvt ppx given risk of COVID - appears improved on exam   - LE ANGEL performed - with +moderate PAD  -Xray right knee  negative for acute fracture  Xray right ankle  negative for fracture +calcaneal spur  -INR 1.18, similar to 4/10 (1.19)    #Acute Hypoxic Respiratory Failure secondary to COVID-19 PNA  Date of COVID testin20  - Hydroxychloroquine not given, pt with prolonged QTc  - EKG - QTc: 500 (), incomplete RBBB  - Incentive spirometry, robitussin DM, ocean spray  -patient tolerating therapies without O2  -COVID reswab NEGATIVE     #UTI  -completing macrobid 5/15  -urine cultures - +E coli >100k macrobid sensitive  -mild leukocytosis 11.72 . Afebrile, symptoms improved  CBC     #BRADY 2/2 to dehydration vs UTI  encourage po fluids. Monitor  14/0.82  imrpoved  BMP     #HTN  - Lopressor 25 q12, amlodipine 5 qd, hydralazine 50mg qd  controlled 5/15 117/69 - 149/85    #type II Mobitz block, known RBBB  - s/p Medtronic Micra AV+ leadless pacemaker  - outpt EP followup    #T2DM.  - SSI ACHS  -diabetic educator consult appreciated  continue metformin 100 mg BID  -lantus 20 units qhs  controlled 5/15    #HLD  - cont simvastatin 20 qhs    #insomnia  - melatonin 3    #Pain Mgmt   - Tylenol PRN    #GI/Bowel Mgmt   - Continent  - pepcid    #/Bladder Mgmt   - Continent, PVR negative    #FEN   - Diet - Regular + Thins  [CCHO]  DASH/TLC      #skin:  abrasion back. bacitracin and DSD daily     #- DVT PPX  - on hold for bleed    #Case discussed in IDT rounds :  max assist toilet transfers, stnad-pivot mod assist limited by pain, Mod assist transfers. Deficits i nproblem-solving, comprehension, memory, Has some improvement in insight which may also impact mood  -goals: mod assist bADLs, transfers, ambulation , requires 24 hours supervision  -patient dose not appear to have that level of support at home at this time. Will look for [facility] to continue Ot, PT, SLP to improve funciton once medically stable  COVID reswab     #LABS  CBC BMp   Repeat Head CT 5/15      addendum:    EXAM:  CT BRAIN      PROCEDURE DATE:  05/15/2020        INTERPRETATION:      CT head without IV contrast        CLINICAL INFORMATION:  Follow-up subarachnoid   Intracranial hemorrhage.    TECHNIQUE: Contiguous axial 5 mm sections were obtained through the head. Sagittal and coronal 2-D reformatted images were also obtained.   This scan was performed using automatic exposure control (radiation dose reduction software) to obtain a diagnostic image quality scan with patient dose as low as reasonably achievable.     FINDINGS:   2020 available for review.    The brain demonstrates chronic infarctions in the LEFT frontal and parietal lobes with unchanged focus of subarachnoid hemorrhage in the LEFT frontal infarction. Minimal subarachnoid hemorrhage also seen within the RIGHT lateral perimesencephalic cistern. Old infarction again noted in the RIGHT cerebellum. Stable 2.5 cm arachnoid cyst in the anterior LEFT middle cranial fossa.     No mass effect is found in the brain.      The ventricles, sulci and basal cisterns appear unremarkable.    The orbits are unremarkable.  The paranasal sinuses are clear.  The nasal cavity appears intact.  The nasopharynx is symmetric.  The central skull base, petrous temporal bones and calvarium remain intact.      IMPRESSION:   Chronic infarctions in the LEFT frontal and parietal lobes with unchanged focus of subarachnoid hemorrhage in the LEFT frontal infarction. Minimal subarachnoid hemorrhage also seen within the RIGHT lateral perimesencephalic cistern. Old infarction again noted in the RIGHT cerebellum. Stable 2.5 cm arachnoid cyst in the anterior LEFT middle cranial fossa.      -continue to hold lovenox and ASA  -will request heme consult as patient with areas of bleeding on brain as well as RLE               BRYN CRUZ M.D., ATTENDING RADIOLOGIST  This document has been electronically signed. May 15 2020  9:44AM              addendum 5/15/20 3:10 PM: neurology greatly appreciated. Will continue to monitor, repeat Head CT 20

## 2020-05-15 NOTE — PROGRESS NOTE ADULT - SUBJECTIVE AND OBJECTIVE BOX
Patient seen and examined at bedside. No overnight events per nursing or chart review. Patient is currently without major complaints. 10 point ROS is negative.     ALLERGIES:  No Known Allergies    MEDICATIONS  (STANDING):  amLODIPine   Tablet 5 milliGRAM(s) Oral daily  ammonium lactate 12% Lotion 1 Application(s) Topical two times a day  atorvastatin 10 milliGRAM(s) Oral at bedtime  BACItracin   Ointment 1 Application(s) Topical daily  dextrose 5%. 1000 milliLiter(s) (50 mL/Hr) IV Continuous <Continuous>  dextrose 50% Injectable 12.5 Gram(s) IV Push once  dextrose 50% Injectable 25 Gram(s) IV Push once  dextrose 50% Injectable 25 Gram(s) IV Push once  famotidine    Tablet 20 milliGRAM(s) Oral daily  FLUoxetine 20 milliGRAM(s) Oral daily  gabapentin 300 milliGRAM(s) Oral at bedtime  hydrALAZINE 50 milliGRAM(s) Oral daily  insulin glargine Injectable (LANTUS) 20 Unit(s) SubCutaneous at bedtime  insulin lispro (HumaLOG) corrective regimen sliding scale   SubCutaneous three times a day before meals  insulin lispro (HumaLOG) corrective regimen sliding scale   SubCutaneous at bedtime  lidocaine   Patch 1 Patch Transdermal daily  melatonin 3 milliGRAM(s) Oral at bedtime  metFORMIN 1000 milliGRAM(s) Oral two times a day with meals  metoprolol tartrate 25 milliGRAM(s) Oral every 12 hours  polyethylene glycol 3350 17 Gram(s) Oral daily  sodium chloride 0.65% Nasal 1 Spray(s) Both Nostrils four times a day  sodium chloride 0.9% Bolus 1000 milliLiter(s) IV Bolus once    MEDICATIONS  (PRN):  acetaminophen   Tablet .. 650 milliGRAM(s) Oral every 6 hours PRN Temp greater or equal to 38C (100.4F), Mild Pain (1 - 3)  benzonatate 100 milliGRAM(s) Oral every 8 hours PRN Cough  dextrose 40% Gel 15 Gram(s) Oral once PRN Blood Glucose LESS THAN 70 milliGRAM(s)/deciliter  glucagon  Injectable 1 milliGRAM(s) IntraMuscular once PRN Glucose LESS THAN 70 milligrams/deciliter  lactulose Syrup 10 Gram(s) Oral daily PRN constipation  saline laxative (FLEET) Rectal Enema 1 Enema Rectal once PRN if no BM by this evening 5/13/20    Vital Signs Last 24 Hrs  T(F): 97.6 (15 May 2020 07:38), Max: 98.7 (14 May 2020 22:16)  HR: 73 (15 May 2020 07:38) (73 - 83)  BP: 117/69 (15 May 2020 07:38) (117/69 - 149/85)  RR: 16 (15 May 2020 07:38) (16 - 17)  SpO2: 96% (15 May 2020 07:38) (96% - 98%)  I&O's Summary      PHYSICAL EXAM:  Gen: nad, resting in bed  Neuro: aaox3, right lower extremity weakness noted  Heent: eomi b/l, no jvd, no oral exudates  Pulm: cta b/l, no w/r/r  CV: +s1s2, no m/r/g  Ab: soft, nt/nd, normoactive bs x 4  Extrem: no edema, pulses intact and equal, pain upon palpation of RLE, ecchymosis also noted.       LABS:                        10.9   11.72 )-----------( 521      ( 14 May 2020 08:00 )             32.9       05-15    134  |  100  |  19  ----------------------------<  102  4.2   |  22  |  0.88    Ca    9.1      15 May 2020 06:30    TPro  7.7  /  Alb  3.6  /  TBili  0.6  /  DBili  x   /  AST  20  /  ALT  18  /  AlkPhos  62  05-14     eGFR if Non African American: 68 mL/min/1.73M2 (05-15-20 @ 06:30)  eGFR if African American: 78 mL/min/1.73M2 (05-15-20 @ 06:30)    PT/INR - ( 14 May 2020 08:00 )   PT: 13.3 sec;   INR: 1.18 ratio         PTT - ( 14 May 2020 08:00 )  PTT:33.4 sec         04-17 Chol 103 mg/dL LDL 40 mg/dL HDL 34 mg/dL Trig 146 mg/dL      POCT Blood Glucose.: 141 mg/dL (15 May 2020 12:04)  POCT Blood Glucose.: 137 mg/dL (15 May 2020 07:33)  POCT Blood Glucose.: 117 mg/dL (14 May 2020 22:13)  POCT Blood Glucose.: 111 mg/dL (14 May 2020 17:09)

## 2020-05-16 LAB
CK SERPL-CCNC: 114 U/L — SIGNIFICANT CHANGE UP (ref 25–170)
HCT VFR BLD CALC: 31.5 % — LOW (ref 34.5–45)
HGB BLD-MCNC: 10.5 G/DL — LOW (ref 11.5–15.5)
MCHC RBC-ENTMCNC: 29 PG — SIGNIFICANT CHANGE UP (ref 27–34)
MCHC RBC-ENTMCNC: 33.3 GM/DL — SIGNIFICANT CHANGE UP (ref 32–36)
MCV RBC AUTO: 87 FL — SIGNIFICANT CHANGE UP (ref 80–100)
NRBC # BLD: 0 /100 WBCS — SIGNIFICANT CHANGE UP (ref 0–0)
PLATELET # BLD AUTO: 520 K/UL — HIGH (ref 150–400)
RBC # BLD: 3.62 M/UL — LOW (ref 3.8–5.2)
RBC # FLD: 16.2 % — HIGH (ref 10.3–14.5)
WBC # BLD: 10.96 K/UL — HIGH (ref 3.8–10.5)
WBC # FLD AUTO: 10.96 K/UL — HIGH (ref 3.8–10.5)

## 2020-05-16 PROCEDURE — 99232 SBSQ HOSP IP/OBS MODERATE 35: CPT

## 2020-05-16 PROCEDURE — 99233 SBSQ HOSP IP/OBS HIGH 50: CPT | Mod: GC

## 2020-05-16 RX ADMIN — Medication 3 MILLIGRAM(S): at 20:48

## 2020-05-16 RX ADMIN — LIDOCAINE 1 PATCH: 4 CREAM TOPICAL at 12:09

## 2020-05-16 RX ADMIN — Medication 1 SPRAY(S): at 11:39

## 2020-05-16 RX ADMIN — INSULIN GLARGINE 20 UNIT(S): 100 INJECTION, SOLUTION SUBCUTANEOUS at 20:46

## 2020-05-16 RX ADMIN — Medication 1 SPRAY(S): at 18:34

## 2020-05-16 RX ADMIN — Medication 1 SPRAY(S): at 05:14

## 2020-05-16 RX ADMIN — Medication 25 MILLIGRAM(S): at 05:14

## 2020-05-16 RX ADMIN — Medication 1 APPLICATION(S): at 05:15

## 2020-05-16 RX ADMIN — Medication 20 MILLIGRAM(S): at 11:40

## 2020-05-16 RX ADMIN — AMLODIPINE BESYLATE 5 MILLIGRAM(S): 2.5 TABLET ORAL at 05:15

## 2020-05-16 RX ADMIN — Medication 100 MILLIGRAM(S): at 11:39

## 2020-05-16 RX ADMIN — METFORMIN HYDROCHLORIDE 1000 MILLIGRAM(S): 850 TABLET ORAL at 17:35

## 2020-05-16 RX ADMIN — GABAPENTIN 300 MILLIGRAM(S): 400 CAPSULE ORAL at 20:46

## 2020-05-16 RX ADMIN — LIDOCAINE 1 PATCH: 4 CREAM TOPICAL at 18:24

## 2020-05-16 RX ADMIN — FAMOTIDINE 20 MILLIGRAM(S): 10 INJECTION INTRAVENOUS at 11:40

## 2020-05-16 RX ADMIN — Medication 25 MILLIGRAM(S): at 17:35

## 2020-05-16 RX ADMIN — Medication 1 APPLICATION(S): at 11:40

## 2020-05-16 RX ADMIN — Medication 1 APPLICATION(S): at 17:36

## 2020-05-16 RX ADMIN — Medication 50 MILLIGRAM(S): at 05:15

## 2020-05-16 RX ADMIN — ATORVASTATIN CALCIUM 10 MILLIGRAM(S): 80 TABLET, FILM COATED ORAL at 20:45

## 2020-05-16 RX ADMIN — METFORMIN HYDROCHLORIDE 1000 MILLIGRAM(S): 850 TABLET ORAL at 08:37

## 2020-05-16 NOTE — PROGRESS NOTE ADULT - ASSESSMENT
Ms Ko is a pleasant 69 year-old female with hx of HTN, HLD, T2DM, CVA, GERD, RBBB with COVID-19 debility and Left frontal and parietal CVA. Currently admitted to [facility] for acute rehabilitation of her functional deficits. Repeat CT head this morning showing new small areas of bleeding. Neurology to comment. Hematology to comment on coagulation/bleeding disorder given persistent bleeding despite being off anticoagulation.     Neurology  Left frontal parietal CVA and SAH  - cont statin  - follow up with neurology regarding timing to resume ASA and DVT ppx  - repeat head CT today showing new area of small SAH bleed  - will defer management recommendations to neurology    Pulmonary/ID  COVID-19  - was on empiric therapeutic lovenox for hypercoagulable state due to covid  - saturating 99 % on RA currently  - respiratory status stable, no need to resume full dose lovenox.     Cardiovascular  Hypertension  - BP controlled  - hydralazine, metoprolol, amlodipine   Mobitz type II AV block   - s/p PPM, ecg now with RBBB from pacing  RLE hematoma  - pain controlled and improving  - vascular sx: not likely compartment syndrome  - h/h stable  - off lovenox given above  - given worsening pain please obtain CBC and CPK.     Renal  Hyponatremia.   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   - fluid restrict to 1.5L Daiy  - monitor bmp daily     Endocrine  DM II  - FS controlled, goal -180 while hospitalized  - metformin 1000mg q12, Lantus and ISS    Hematology  Leukocytosis  - mild without signs of infection  - continue to monitor  Normocytic Anemia  - likely due to blood loss given hematoma  - please obtain CBC today  Bleeding  - given continued bleeding as evidence on CT Head as well as hematoma formation. heme recs pending.     Diet: per primary  Pain control: per primary  DVT ppx: ambulation, SCD  Dispo: per primary  Case discussed with primary team  If a clinical question should arise regarding this patient, please do not hesitate to contact me at [phone] until 5pm on 5/16/2020

## 2020-05-16 NOTE — PROGRESS NOTE ADULT - SUBJECTIVE AND OBJECTIVE BOX
Patient is a 69 year old woman admitted 4/23/20 to [facility].  She had been hospitalized on 4/8/20 with Covid 19 (+). She was found to have a Mobitz type II block and a pacemaker was placed. On 4/16 she was found aphasic with right-sided hemiparesis. A CT head noted a left frontoparietal infarct. A CTA Head and Neck noted Left ICA stenosis without occlusion. She was placed on ASA.  She was also  placed on full dose of lovenox with consideration of hypercoagulable state and COVID -19 (+). Today noted extensive ecchymosis involving the right posterior calf and patient complains of pain involving the same area. She notes mild HA generalized . She denies any change with difficulty with speech. She states no change with right arm weakness and inability to move right leg. She denies other neurological complaints. She denies other complaints. Today, Saturday, 5/9, the patient states the right calf pain is less. She continues with mild HA and no change with neurological complaints. Today, Saturday,  5/16, she denies HA. She denies any change with her neurological complaints.    PMH: As above.          HTN          HLD         DM             SH: No allergy    Exam: Awake, alert, speaks to this physician in Georgian           Speech- decreased fluency, responds with 1-2 and occasionally 3 words in response to questions and searches for words, follows commands promptly, no dysarthria           Pupils 2.5mm, reactive, RHH              Motor tone and strength-RUE-4/5      LUE 5/5                                                 RLE-0/5,      LLE 5/5    < from: CT Head No Cont (05.15.20 @ 09:33) >    FINDINGS:   05/12/2020 available for review.    The brain demonstrates chronic infarctions in the LEFT frontal and parietal lobes with unchanged focus of subarachnoid hemorrhage in the LEFT frontal infarction. Minimal subarachnoid hemorrhage also seen within the RIGHT lateral perimesencephalic cistern. Old infarction again noted in the RIGHT cerebellum. Stable 2.5 cm arachnoid cyst in the anterior LEFT middle cranial fossa.     No mass effect is found in the brain.      The ventricles, sulci and basal cisterns appear unremarkable.    The orbits are unremarkable.  The paranasal sinuses are clear.  The nasal cavity appears intact.  The nasopharynx is symmetric.  The central skull base, petrous temporal bones and calvarium remain intact.      IMPRESSION:   Chronic infarctions in the LEFT frontal and parietal lobes with unchanged focus of subarachnoid hemorrhage in the LEFT frontal infarction. Minimal subarachnoid hemorrhage also seen within the RIGHT lateral perimesencephalic cistern. Old infarction again noted in the RIGHT cerebellum. Stable 2.5 cm arachnoid cyst in the anterior LEFT middle cranial fossa.              < from: CT Head No Cont (05.12.20 @ 10:58) >    FINDINGS:      As compared to prior study, there is no significant interval change. Again there are ischemic changes in the left hemisphere with a now mature appearance of the left the MCA and OLU infarcts are present on 4/16/2020. Again noted is low-level blood in the left the anterior paracentral region. There is no evidence of rebleeding or new hemorrhage.    Also noted are involutional changes in both hemispheres with volume loss.    Ventricles are midline    Chronic ischemic changes are again noted in the cerebellum.    A left temporal fossa arachnoid cyst is again noted.    IMPRESSION:    1)  stable follow-up study with no significant change.  2)  follow-up MR imaging recommended for further assessment.                < end of copied text >                 < from: CT Head No Cont (05.10.20 @ 09:18) >    TECHNIQUE: Noncontrast CT of the head. Multiplanar reformations are submitted.    FINDINGS: Mild subarachnoid hemorrhage in the left frontal lobe, unchanged. Stable chronic left MCA territory infarcts. Stable chronic right inferior cerebellar hemisphere infarcts. Consider MRI as clinically warranted. Stable left frontal lobe arachnoid cyst.    There is periventricular and subcortical white matter hypodensity without mass effect, nonspecific, likely representing mild chronic microvascular ischemic changes. There is no compelling evidence for an acute transcortical infarction. There is no evidence of mass, mass effect, midline shift or extra-axial fluid collection. The lateral ventricles and cortical sulci are age-appropriate in size and configuration. The orbits, mastoid air cells and visualized paranasal sinuses are unremarkable. The calvariumis intact.    IMPRESSION: Mild subarachnoid hemorrhage in the left frontal lobe, unchanged. No hydrocephalus. No new hemorrhage.      < from: CT Head No Cont (05.09.20 @ 17:50) >    FINDINGS: Small arachnoid cyst adjacent to the left sylvian fissure on image 8, series 2 measures 2.2 x 2.2 cm, unchanged in size but demonstrates subtle increased density within it.     Subacute/chronic left frontal infarct with mild subarachnoid hemorrhage is demonstrated on image 15, series 2.    Evolving subacute/chronic left parietal lobe infarct. Chronic right inferior cerebellar hemisphere infarct.    There is periventricular and subcortical white matter hypodensity without mass effect, nonspecific, likely representing mild chronic microvascular ischemic changes. There is no compelling evidence for an acute transcortical infarction. There is no other evidence of mass, mass effect, midline shift or extra-axial fluid collection. The lateral ventricles and cortical sulci are age-appropriate in size and configuration. The orbits, mastoid air cells and visualized paranasal sinuses are unremarkable. The calvarium is intact.    IMPRESSION:  New mild subarachnoid hemorrhage in the paramedian left frontal lobe.Evolving subacute/chronic left MCA territory infarcts.. Findings discussed with Dr. Cross.          <

## 2020-05-16 NOTE — PROGRESS NOTE ADULT - SUBJECTIVE AND OBJECTIVE BOX
Patient is a 69y old  Female who presents with a chief complaint of left frontal parietal CVA with right HP and aphasia, COVID+ 4/8/20 (14 May 2020 13:44)      HPI:  SHIREEN CASH is a 70yo female RH dominant with PMH of HTN, HLD, T2DM, hx CVA, GERD, known RBBB presented to [facility] ED on 4/8/20 with complaints of fever, chills, cough, chest pain, SOB for 5 days. Per patient, her  tested +COVID at an outpatient testing facility a few days prior, patient herself had tested negative at the time. They continued to share the same living space. ED workup showed +COVID and Mobitz type II block. EP was consulted and patient is s/p Medtronic Micra AV+ leadless pacemaker implantation via right femoral vein on 4/10/20. Hydroxychloroquine presumably not given due to prolonged QTc.     On 4/16/20, patient developed acute aphasia and right sided weakness. CT head showed an acute LEFT frontal/parietal CVA. CTA head/neck showed LEFT ICA stenosis but no occlusion. tPA was not administered due to timing and no penumbra. Placed on full dose lovenox as thoguht to be hypercoagulable secondary to COVID. Patient was cleared for regular consistency solids, thin liquids. Transferred to [facility] for acute inpatient rehab services 4/23/20. (23 Apr 2020 14:26)    Subjective: Patient seen and evaluated this morning. No acute events overnight. Participating well in therapy. Pain is well controlled. Denies chest pain, SOB, nausea, vomiting, constipation or diarrhea. Slept well last night.       PAST MEDICAL & SURGICAL HISTORY:  H/O gastroesophageal reflux (GERD)  HTN (hypertension)  DM (diabetes mellitus)  History of benign brain tumor      MEDICATIONS  (STANDING):  amLODIPine   Tablet 5 milliGRAM(s) Oral daily  ammonium lactate 12% Lotion 1 Application(s) Topical two times a day  atorvastatin 10 milliGRAM(s) Oral at bedtime  BACItracin   Ointment 1 Application(s) Topical daily  dextrose 5%. 1000 milliLiter(s) (50 mL/Hr) IV Continuous <Continuous>  dextrose 50% Injectable 12.5 Gram(s) IV Push once  dextrose 50% Injectable 25 Gram(s) IV Push once  dextrose 50% Injectable 25 Gram(s) IV Push once  famotidine    Tablet 20 milliGRAM(s) Oral daily  FLUoxetine 20 milliGRAM(s) Oral daily  gabapentin 300 milliGRAM(s) Oral at bedtime  hydrALAZINE 50 milliGRAM(s) Oral daily  insulin glargine Injectable (LANTUS) 20 Unit(s) SubCutaneous at bedtime  insulin lispro (HumaLOG) corrective regimen sliding scale   SubCutaneous three times a day before meals  insulin lispro (HumaLOG) corrective regimen sliding scale   SubCutaneous at bedtime  lidocaine   Patch 1 Patch Transdermal daily  melatonin 3 milliGRAM(s) Oral at bedtime  metFORMIN 1000 milliGRAM(s) Oral two times a day with meals  metoprolol tartrate 25 milliGRAM(s) Oral every 12 hours  polyethylene glycol 3350 17 Gram(s) Oral daily  sodium chloride 0.65% Nasal 1 Spray(s) Both Nostrils four times a day  sodium chloride 0.9% Bolus 1000 milliLiter(s) IV Bolus once    MEDICATIONS  (PRN):  acetaminophen   Tablet .. 650 milliGRAM(s) Oral every 6 hours PRN Temp greater or equal to 38C (100.4F), Mild Pain (1 - 3)  benzonatate 100 milliGRAM(s) Oral every 8 hours PRN Cough  dextrose 40% Gel 15 Gram(s) Oral once PRN Blood Glucose LESS THAN 70 milliGRAM(s)/deciliter  glucagon  Injectable 1 milliGRAM(s) IntraMuscular once PRN Glucose LESS THAN 70 milligrams/deciliter  lactulose Syrup 10 Gram(s) Oral daily PRN constipation  saline laxative (FLEET) Rectal Enema 1 Enema Rectal once PRN if no BM by this evening 5/13/20      Allergies    No Known Allergies    Intolerances          VITALS   Vital Signs Last 24 Hrs  T(C): 36.7 (16 May 2020 08:30), Max: 37.1 (15 May 2020 20:13)  T(F): 98 (16 May 2020 08:30), Max: 98.7 (15 May 2020 20:13)  HR: 65 (16 May 2020 08:30) (65 - 84)  BP: 116/72 (16 May 2020 08:30) (116/72 - 127/74)  BP(mean): --  RR: 16 (16 May 2020 08:30) (16 - 17)  SpO2: 97% (16 May 2020 08:30) (95% - 97%)      RECENT LABS:             PTT - ( 14 May 2020 08:00 )  PTT:33.4 sec        CAPILLARY BLOOD GLUCOSE      POCT Blood Glucose.: 141 mg/dL (15 May 2020 12:04)  POCT Blood Glucose.: 137 mg/dL (15 May 2020 07:33)  POCT Blood Glucose.: 117 mg/dL (14 May 2020 22:13)  POCT Blood Glucose.: 111 mg/dL (14 May 2020 17:09)  POCT Blood Glucose.: 160 mg/dL (14 May 2020 12:23)          Review of Systems:   · Additional ROS	Patient appears in better spirtis. FS now controlled, Voiding well without dysuria. Last BM 2 days ago 5/13  No fever. Painin LE improved, participating more in therapy. no cough or SOB	    Physical Exam:   · Constitutional	detailed exam	  · Constitutional Comments	alert, was pleasant and cooperative NAD, aphasia expressive >receptive. Occasional errors in verbal yes/no but corrects with head nods/	  · Respiratory	detailed exam	  · Respiratory Details	good air movement; respirations non-labored; clear to auscultation bilaterally	  · Cardiovascular	detailed exam	  · Cardiovascular Details	regular rate and rhythm	  · Gastrointestinal	detailed exam	  · GI Normal	soft; nontender; bowel sounds normal	  · Extremities	detailed exam	  · Extremities Comments	improved swelling RLE no redness or warmth healing ecchymoses right lateral and medial lower leg no ankle swelling mild TTP compression distally

## 2020-05-16 NOTE — PROGRESS NOTE ADULT - SUBJECTIVE AND OBJECTIVE BOX
Patient seen and examined at bedside. No overnight events per nursing or chart review. Patient notes worsening pain in her right leg which she says has progressed over the past 24 to 48 hours. She describes it as a tight/burning sensation and that it is intermittent. Her 10 point ROS is otherwise unremarkable.     ALLERGIES:  No Known Allergies    MEDICATIONS  (STANDING):  amLODIPine   Tablet 5 milliGRAM(s) Oral daily  ammonium lactate 12% Lotion 1 Application(s) Topical two times a day  atorvastatin 10 milliGRAM(s) Oral at bedtime  BACItracin   Ointment 1 Application(s) Topical daily  dextrose 5%. 1000 milliLiter(s) (50 mL/Hr) IV Continuous <Continuous>  dextrose 50% Injectable 12.5 Gram(s) IV Push once  dextrose 50% Injectable 25 Gram(s) IV Push once  dextrose 50% Injectable 25 Gram(s) IV Push once  famotidine    Tablet 20 milliGRAM(s) Oral daily  FLUoxetine 20 milliGRAM(s) Oral daily  gabapentin 300 milliGRAM(s) Oral at bedtime  hydrALAZINE 50 milliGRAM(s) Oral daily  insulin glargine Injectable (LANTUS) 20 Unit(s) SubCutaneous at bedtime  insulin lispro (HumaLOG) corrective regimen sliding scale   SubCutaneous three times a day before meals  insulin lispro (HumaLOG) corrective regimen sliding scale   SubCutaneous at bedtime  lidocaine   Patch 1 Patch Transdermal daily  melatonin 3 milliGRAM(s) Oral at bedtime  metFORMIN 1000 milliGRAM(s) Oral two times a day with meals  metoprolol tartrate 25 milliGRAM(s) Oral every 12 hours  polyethylene glycol 3350 17 Gram(s) Oral daily  sodium chloride 0.65% Nasal 1 Spray(s) Both Nostrils four times a day  sodium chloride 0.9% Bolus 1000 milliLiter(s) IV Bolus once    MEDICATIONS  (PRN):  acetaminophen   Tablet .. 650 milliGRAM(s) Oral every 6 hours PRN Temp greater or equal to 38C (100.4F), Mild Pain (1 - 3)  benzonatate 100 milliGRAM(s) Oral every 8 hours PRN Cough  dextrose 40% Gel 15 Gram(s) Oral once PRN Blood Glucose LESS THAN 70 milliGRAM(s)/deciliter  glucagon  Injectable 1 milliGRAM(s) IntraMuscular once PRN Glucose LESS THAN 70 milligrams/deciliter  lactulose Syrup 10 Gram(s) Oral daily PRN constipation  saline laxative (FLEET) Rectal Enema 1 Enema Rectal once PRN if no BM by this evening 5/13/20    Vital Signs Last 24 Hrs  T(F): 98 (16 May 2020 08:30), Max: 98.7 (15 May 2020 20:13)  HR: 65 (16 May 2020 08:30) (65 - 84)  BP: 116/72 (16 May 2020 08:30) (116/72 - 127/74)  RR: 16 (16 May 2020 08:30) (16 - 17)  SpO2: 97% (16 May 2020 08:30) (95% - 97%)  I&O's Summary    15 May 2020 07:01  -  16 May 2020 07:00  --------------------------------------------------------  IN: 125 mL / OUT: 0 mL / NET: 125 mL        PHYSICAL EXAM:  Gen: nad, resting in bed  Neuro: aaox3, unchanged from previous  Heent: eomi b/l, no jvd, no oral exudates  Pulm: cta b/l, no w/r/r  CV: +s1s2, no m/r/g  Ab: soft, nt/nd, normoactive bs x 4  Extrem: no edema, pulses intact and equal - obtained via doppler.       LABS:                        10.9   11.72 )-----------( 521      ( 14 May 2020 08:00 )             32.9       05-15    134  |  100  |  19  ----------------------------<  102  4.2   |  22  |  0.88    Ca    9.1      15 May 2020 06:30    TPro  7.7  /  Alb  3.6  /  TBili  0.6  /  DBili  x   /  AST  20  /  ALT  18  /  AlkPhos  62  05-14     eGFR if Non African American: 68 mL/min/1.73M2 (05-15-20 @ 06:30)  eGFR if African American: 78 mL/min/1.73M2 (05-15-20 @ 06:30)    PT/INR - ( 14 May 2020 08:00 )   PT: 13.3 sec;   INR: 1.18 ratio         PTT - ( 14 May 2020 08:00 )  PTT:33.4 sec         04-17 Chol 103 mg/dL LDL 40 mg/dL HDL 34 mg/dL Trig 146 mg/dL              POCT Blood Glucose.: 115 mg/dL (16 May 2020 11:29)  POCT Blood Glucose.: 88 mg/dL (16 May 2020 07:44)  POCT Blood Glucose.: 112 mg/dL (15 May 2020 22:10)  POCT Blood Glucose.: 120 mg/dL (15 May 2020 17:22)

## 2020-05-16 NOTE — PROGRESS NOTE ADULT - ASSESSMENT
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 - Holding due to finding of SAH. HEAD CT 5/15 for determination start AC  - cont prozac for motor recovery. Increase to 20 mg daily for mood d/o , tolerating, improved  -neuropsychology supportive counseling  -right SAFO as outpatient. Continue ACe wrapping for foot drop for now  -PRECAUTIONS: airborne, contact, aspiration, cardiac, fall  -Head CT : new Mild subarachnoid hemorrhage in the left frontal lobe stable on repeat imaging 5/10  -repeat CT head  - STABLE NO NEW BLEED  -neuro consult appreciated . Hold off on MRI,  - Repeat Head CT 5/15 - Chronic infarctions in the LEFT frontal and parietal lobes with unchanged focus of subarachnoid hemorrhage in the LEFT frontal infarction. Minimal subarachnoid hemorrhage also seen within the RIGHT lateral perimesencephalic cistern. Old infarction again noted in the RIGHT cerebellum. Stable 2.5 cm arachnoid cyst in the anterior LEFT middle cranial fossa.    #RLE pain improed  - Doppler negative DVT ,   -  CT RLE with finding of hematoma, vascular consulted would monitor for now, can continue dvt ppx given risk of COVID - appears improved on exam   - LE ANGEL performed - with +moderate PAD  -Xray right knee  negative for acute fracture  Xray right ankle  negative for fracture +calcaneal spur  -INR 1.18, similar to 4/10 (1.19)    #Acute Hypoxic Respiratory Failure secondary to COVID-19 PNA  Date of COVID testin20  - Hydroxychloroquine not given, pt with prolonged QTc  - EKG - QTc: 500 (), incomplete RBBB  - Incentive spirometry, robitussin DM, ocean spray  -patient tolerating therapies without O2  -COVID reswab NEGATIVE     #UTI  -completing macrobid 5/15  -urine cultures - +E coli >100k macrobid sensitive  -mild leukocytosis 11.72 . Afebrile, symptoms improved  CBC     #BRADY 2/2 to dehydration vs UTI  encourage po fluids. Monitor  14/0.82  imrpoved  BMP     #HTN  - Lopressor 25 q12, amlodipine 5 qd, hydralazine 50mg qd  controlled 5/15 117/69 - 149/85    #type II Mobitz block, known RBBB  - s/p Medtronic Micra AV+ leadless pacemaker  - outpt EP followup    #T2DM.  - SSI ACHS  -diabetic educator consult appreciated  continue metformin 100 mg BID  -lantus 20 units qhs  controlled 5/15    #HLD  - cont simvastatin 20 qhs    #insomnia  - melatonin 3    #Pain Mgmt   - Tylenol PRN    #GI/Bowel Mgmt   - Continent  - pepcid    #/Bladder Mgmt   - Continent, PVR negative    #FEN   - Diet - Regular + Thins  [[facility]]  DASH/TLC      #skin:  abrasion back. bacitracin and DSD daily     #- DVT PPX  - on hold for bleed    #Case discussed in IDT rounds :  max assist toilet transfers, stnad-pivot mod assist limited by pain, Mod assist transfers. Deficits i nproblem-solving, comprehension, memory, Has some improvement in insight which may also impact mood  -goals: mod assist bADLs, transfers, ambulation , requires 24 hours supervision  -patient dose not appear to have that level of support at home at this time. Will look for [facility]ALA to continue Ot, PT, SLP to improve funciton once medically stable  COVID reswab     #LABS  CBC BMp   Repeat Head CT 5/15      addendum:    EXAM:  CT BRAIN      PROCEDURE DATE:  05/15/2020        INTERPRETATION:      CT head without IV contrast        CLINICAL INFORMATION:  Follow-up subarachnoid   Intracranial hemorrhage.    TECHNIQUE: Contiguous axial 5 mm sections were obtained through the head. Sagittal and coronal 2-D reformatted images were also obtained.   This scan was performed using automatic exposure control (radiation dose reduction software) to obtain a diagnostic image quality scan with patient dose as low as reasonably achievable.     FINDINGS:   2020 available for review.    The brain demonstrates chronic infarctions in the LEFT frontal and parietal lobes with unchanged focus of subarachnoid hemorrhage in the LEFT frontal infarction. Minimal subarachnoid hemorrhage also seen within the RIGHT lateral perimesencephalic cistern. Old infarction again noted in the RIGHT cerebellum. Stable 2.5 cm arachnoid cyst in the anterior LEFT middle cranial fossa.     No mass effect is found in the brain.      The ventricles, sulci and basal cisterns appear unremarkable.    The orbits are unremarkable.  The paranasal sinuses are clear.  The nasal cavity appears intact.  The nasopharynx is symmetric.  The central skull base, petrous temporal bones and calvarium remain intact.      IMPRESSION:   Chronic infarctions in the LEFT frontal and parietal lobes with unchanged focus of subarachnoid hemorrhage in the LEFT frontal infarction. Minimal subarachnoid hemorrhage also seen within the RIGHT lateral perimesencephalic cistern. Old infarction again noted in the RIGHT cerebellum. Stable 2.5 cm arachnoid cyst in the anterior LEFT middle cranial fossa.      -continue to hold lovenox and ASA  -will request heme consult as patient with areas of bleeding on brain as well as RLE               BRYN CRUZ M.D., ATTENDING RADIOLOGIST  This document has been electronically signed. May 15 2020  9:44AM              addendum 5/15/20 3:10 PM: neurology greatly appreciated. Will continue to monitor, repeat Head CT 20

## 2020-05-16 NOTE — PROGRESS NOTE ADULT - ASSESSMENT
Patient is a 69 year old woman admitted 4/23/20 to [facility].  She had been hospitalized on 4/8/20 with Covid 19 (+). She was found to have a Mobitz type II block and a pacemaker was placed. On 4/16 she was found aphasic with right-sided hemiparesis. A CT head noted a left frontoparietal infarct. A CTA Head and Neck noted Left ICA stenosis without occlusion. She was placed on ASA.  She was also  placed on full dose of lovenox with consideration of hypercoagulable state and COVID -19 (+). Today noted extensive ecchymosis involving the right posterior calf and patient complains of pain involving the same area. She notes mild HA generalized . She denies any change with difficulty with speech. She states no change with right arm weakness and inability to move right leg. She denies other neurological complaints. She denies other complaints. Neurological exam as above. Today, Saturday,5/9, notes less calf pain. She continues with mild HA and no change with neurological complaints. Today, Saturday, 5/16, she denies any HA. She notes no change with neurological complaints.      1) CT head 5/9 as  new mild SAH left frontal. Evolving subacute left MCA infarcts, chronic right inf cerebellar hemispheric infarct, small arachnoid cyst adjacent to left sylvian fissure.  2) CT Head 5/10  mild SAH left frontal unchanged from CT Head 5/9.  No new hemorrhage. Stable chronic Left MCA infarcts. Stable chronic right inf cerebellar infarct. Stable chronic small subarachnoid cyst.  3) Agree with ASA 81 mg daily discontinued and Lovenox discontinued.  4) CT head 5/12  again noted low level blood left anterior paracentral region, no evidence of new hemorrhage. Stable follow-up with no significant change. Ischemic changes with now mature appearance of Left MCA and OLU infarcts seen 4/16/20. follow-up MRI imaging recommended for further assessment.  5)  CT head 5/15,  chronic infarction left frontal and parietal lobes with unchanged focus of subarachnoid hemorrhage in the left frontal infarction. Minimal subarachnoid hemorrhage within the right lateral perimesencephalic cistern. Old infarct again noted right cerebellum. Stable arachnoid cyst left middle cranial fossa. Discussed with the radiologist her reading of the minimal SAH on the right on this study. She advises that not seen on the prior CT Head 5/12/20.  6) Would continue to follow with CT head and would obtain CT Head on 5/20/20, Wednesday.

## 2020-05-17 LAB
ANION GAP SERPL CALC-SCNC: 9 MMOL/L — SIGNIFICANT CHANGE UP (ref 5–17)
BUN SERPL-MCNC: 13 MG/DL — SIGNIFICANT CHANGE UP (ref 7–23)
CALCIUM SERPL-MCNC: 9.2 MG/DL — SIGNIFICANT CHANGE UP (ref 8.4–10.5)
CHLORIDE SERPL-SCNC: 100 MMOL/L — SIGNIFICANT CHANGE UP (ref 96–108)
CO2 SERPL-SCNC: 24 MMOL/L — SIGNIFICANT CHANGE UP (ref 22–31)
CREAT SERPL-MCNC: 0.88 MG/DL — SIGNIFICANT CHANGE UP (ref 0.5–1.3)
GLUCOSE SERPL-MCNC: 88 MG/DL — SIGNIFICANT CHANGE UP (ref 70–99)
POTASSIUM SERPL-MCNC: 4.1 MMOL/L — SIGNIFICANT CHANGE UP (ref 3.5–5.3)
POTASSIUM SERPL-SCNC: 4.1 MMOL/L — SIGNIFICANT CHANGE UP (ref 3.5–5.3)
SODIUM SERPL-SCNC: 133 MMOL/L — LOW (ref 135–145)

## 2020-05-17 PROCEDURE — 99232 SBSQ HOSP IP/OBS MODERATE 35: CPT

## 2020-05-17 RX ADMIN — GABAPENTIN 300 MILLIGRAM(S): 400 CAPSULE ORAL at 21:06

## 2020-05-17 RX ADMIN — LIDOCAINE 1 PATCH: 4 CREAM TOPICAL at 18:51

## 2020-05-17 RX ADMIN — Medication 1 APPLICATION(S): at 11:37

## 2020-05-17 RX ADMIN — Medication 20 MILLIGRAM(S): at 11:38

## 2020-05-17 RX ADMIN — Medication 25 MILLIGRAM(S): at 05:03

## 2020-05-17 RX ADMIN — LIDOCAINE 1 PATCH: 4 CREAM TOPICAL at 01:00

## 2020-05-17 RX ADMIN — ATORVASTATIN CALCIUM 10 MILLIGRAM(S): 80 TABLET, FILM COATED ORAL at 21:06

## 2020-05-17 RX ADMIN — Medication 1 APPLICATION(S): at 05:02

## 2020-05-17 RX ADMIN — Medication 3 MILLIGRAM(S): at 21:07

## 2020-05-17 RX ADMIN — Medication 50 MILLIGRAM(S): at 05:02

## 2020-05-17 RX ADMIN — METFORMIN HYDROCHLORIDE 1000 MILLIGRAM(S): 850 TABLET ORAL at 07:54

## 2020-05-17 RX ADMIN — METFORMIN HYDROCHLORIDE 1000 MILLIGRAM(S): 850 TABLET ORAL at 17:14

## 2020-05-17 RX ADMIN — LIDOCAINE 1 PATCH: 4 CREAM TOPICAL at 21:07

## 2020-05-17 RX ADMIN — Medication 25 MILLIGRAM(S): at 17:14

## 2020-05-17 RX ADMIN — AMLODIPINE BESYLATE 5 MILLIGRAM(S): 2.5 TABLET ORAL at 05:02

## 2020-05-17 RX ADMIN — Medication 1 SPRAY(S): at 05:03

## 2020-05-17 RX ADMIN — Medication 1 APPLICATION(S): at 17:14

## 2020-05-17 RX ADMIN — Medication 1 SPRAY(S): at 11:39

## 2020-05-17 RX ADMIN — Medication 650 MILLIGRAM(S): at 05:01

## 2020-05-17 RX ADMIN — Medication 1 SPRAY(S): at 00:09

## 2020-05-17 RX ADMIN — FAMOTIDINE 20 MILLIGRAM(S): 10 INJECTION INTRAVENOUS at 11:38

## 2020-05-17 RX ADMIN — Medication 1 SPRAY(S): at 19:08

## 2020-05-17 RX ADMIN — INSULIN GLARGINE 20 UNIT(S): 100 INJECTION, SOLUTION SUBCUTANEOUS at 21:06

## 2020-05-17 RX ADMIN — LIDOCAINE 1 PATCH: 4 CREAM TOPICAL at 11:38

## 2020-05-17 NOTE — PROGRESS NOTE ADULT - SUBJECTIVE AND OBJECTIVE BOX
Patient seen and examined at bedside. No overnight events per nursing or chart review. Patient continues to note pain in lower extremity despite current regimen. 10 point ROS is otherwise negative.     ALLERGIES:  No Known Allergies    MEDICATIONS  (STANDING):  amLODIPine   Tablet 5 milliGRAM(s) Oral daily  ammonium lactate 12% Lotion 1 Application(s) Topical two times a day  atorvastatin 10 milliGRAM(s) Oral at bedtime  BACItracin   Ointment 1 Application(s) Topical daily  dextrose 5%. 1000 milliLiter(s) (50 mL/Hr) IV Continuous <Continuous>  dextrose 50% Injectable 12.5 Gram(s) IV Push once  dextrose 50% Injectable 25 Gram(s) IV Push once  dextrose 50% Injectable 25 Gram(s) IV Push once  famotidine    Tablet 20 milliGRAM(s) Oral daily  FLUoxetine 20 milliGRAM(s) Oral daily  gabapentin 300 milliGRAM(s) Oral at bedtime  hydrALAZINE 50 milliGRAM(s) Oral daily  insulin glargine Injectable (LANTUS) 20 Unit(s) SubCutaneous at bedtime  insulin lispro (HumaLOG) corrective regimen sliding scale   SubCutaneous three times a day before meals  insulin lispro (HumaLOG) corrective regimen sliding scale   SubCutaneous at bedtime  lidocaine   Patch 1 Patch Transdermal daily  melatonin 3 milliGRAM(s) Oral at bedtime  metFORMIN 1000 milliGRAM(s) Oral two times a day with meals  metoprolol tartrate 25 milliGRAM(s) Oral every 12 hours  polyethylene glycol 3350 17 Gram(s) Oral daily  sodium chloride 0.65% Nasal 1 Spray(s) Both Nostrils four times a day  sodium chloride 0.9% Bolus 1000 milliLiter(s) IV Bolus once    MEDICATIONS  (PRN):  acetaminophen   Tablet .. 650 milliGRAM(s) Oral every 6 hours PRN Temp greater or equal to 38C (100.4F), Mild Pain (1 - 3)  benzonatate 100 milliGRAM(s) Oral every 8 hours PRN Cough  dextrose 40% Gel 15 Gram(s) Oral once PRN Blood Glucose LESS THAN 70 milliGRAM(s)/deciliter  glucagon  Injectable 1 milliGRAM(s) IntraMuscular once PRN Glucose LESS THAN 70 milligrams/deciliter  lactulose Syrup 10 Gram(s) Oral daily PRN constipation  saline laxative (FLEET) Rectal Enema 1 Enema Rectal once PRN if no BM by this evening 5/13/20    Vital Signs Last 24 Hrs  T(F): 98.5 (17 May 2020 07:45), Max: 99 (16 May 2020 20:08)  HR: 62 (17 May 2020 07:45) (62 - 99)  BP: 119/60 (17 May 2020 07:45) (119/60 - 163/89)  RR: 16 (17 May 2020 07:45) (16 - 16)  SpO2: 99% (17 May 2020 07:45) (96% - 99%)  I&O's Summary      PHYSICAL EXAM:  Gen: nad, resting in bed  Neuro: aaox3, right LE 0/5  Heent: eomi b/l, no jvd, no oral exudates  Pulm: cta b/l, no w/r/r  CV: +s1s2, no m/r/g  Ab: soft, nt/nd, normoactive bs x 4  Extrem: no edema, pulses intact and equal - confirmed by doppler at posterior tibilais. right leg does not feel tight or swollen. both legs are warm and well perfused      LABS:                        10.5   10.96 )-----------( 520      ( 16 May 2020 14:04 )             31.5       05-17    133  |  100  |  13  ----------------------------<  88  4.1   |  24  |  0.88    Ca    9.2      17 May 2020 06:10       eGFR if Non African American: 68 mL/min/1.73M2 (05-17-20 @ 06:10)  eGFR if : 79 mL/min/1.73M2 (05-17-20 @ 06:10)         CARDIAC MARKERS ( 16 May 2020 14:04 )  x     / x     / 114 U/L / x     / x          04-17 Chol 103 mg/dL LDL 40 mg/dL HDL 34 mg/dL Trig 146 mg/dL              POCT Blood Glucose.: 102 mg/dL (17 May 2020 07:17)  POCT Blood Glucose.: 116 mg/dL (16 May 2020 20:41)  POCT Blood Glucose.: 101 mg/dL (16 May 2020 17:09)  POCT Blood Glucose.: 115 mg/dL (16 May 2020 11:29)            RADIOLOGY & ADDITIONAL TESTS:    Care Discussed with Consultants/Other Providers:

## 2020-05-17 NOTE — PROGRESS NOTE ADULT - SUBJECTIVE AND OBJECTIVE BOX
Patient is a 69y old  Female who presents with a chief complaint of left frontal parietal CVA with right HP and aphasia, COVID+ 4/8/20 (14 May 2020 13:44)      HPI:  SHIREEN CASH is a 70yo female RH dominant with PMH of HTN, HLD, T2DM, hx CVA, GERD, known RBBB presented to [facility] ED on 4/8/20 with complaints of fever, chills, cough, chest pain, SOB for 5 days. Per patient, her  tested +COVID at an outpatient testing facility a few days prior, patient herself had tested negative at the time. They continued to share the same living space. ED workup showed +COVID and Mobitz type II block. EP was consulted and patient is s/p Medtronic Micra AV+ leadless pacemaker implantation via right femoral vein on 4/10/20. Hydroxychloroquine presumably not given due to prolonged QTc.     On 4/16/20, patient developed acute aphasia and right sided weakness. CT head showed an acute LEFT frontal/parietal CVA. CTA head/neck showed LEFT ICA stenosis but no occlusion. tPA was not administered due to timing and no penumbra. Placed on full dose lovenox as thoguht to be hypercoagulable secondary to COVID. Patient was cleared for regular consistency solids, thin liquids. Transferred to [facility] for acute inpatient rehab services 4/23/20. (23 Apr 2020 14:26)    Subjective: Patient seen and evaluated this morning. No acute events overnight. Participating well in therapy. Pain is well controlled. Denies chest pain, SOB, nausea, vomiting, constipation or diarrhea. Slept well last night. Next CT planned 5/20/20      PAST MEDICAL & SURGICAL HISTORY:  H/O gastroesophageal reflux (GERD)  HTN (hypertension)  DM (diabetes mellitus)  History of benign brain tumor      MEDICATIONS  (STANDING):  amLODIPine   Tablet 5 milliGRAM(s) Oral daily  ammonium lactate 12% Lotion 1 Application(s) Topical two times a day  atorvastatin 10 milliGRAM(s) Oral at bedtime  BACItracin   Ointment 1 Application(s) Topical daily  dextrose 5%. 1000 milliLiter(s) (50 mL/Hr) IV Continuous <Continuous>  dextrose 50% Injectable 12.5 Gram(s) IV Push once  dextrose 50% Injectable 25 Gram(s) IV Push once  dextrose 50% Injectable 25 Gram(s) IV Push once  famotidine    Tablet 20 milliGRAM(s) Oral daily  FLUoxetine 20 milliGRAM(s) Oral daily  gabapentin 300 milliGRAM(s) Oral at bedtime  hydrALAZINE 50 milliGRAM(s) Oral daily  insulin glargine Injectable (LANTUS) 20 Unit(s) SubCutaneous at bedtime  insulin lispro (HumaLOG) corrective regimen sliding scale   SubCutaneous three times a day before meals  insulin lispro (HumaLOG) corrective regimen sliding scale   SubCutaneous at bedtime  lidocaine   Patch 1 Patch Transdermal daily  melatonin 3 milliGRAM(s) Oral at bedtime  metFORMIN 1000 milliGRAM(s) Oral two times a day with meals  metoprolol tartrate 25 milliGRAM(s) Oral every 12 hours  polyethylene glycol 3350 17 Gram(s) Oral daily  sodium chloride 0.65% Nasal 1 Spray(s) Both Nostrils four times a day  sodium chloride 0.9% Bolus 1000 milliLiter(s) IV Bolus once    MEDICATIONS  (PRN):  acetaminophen   Tablet .. 650 milliGRAM(s) Oral every 6 hours PRN Temp greater or equal to 38C (100.4F), Mild Pain (1 - 3)  benzonatate 100 milliGRAM(s) Oral every 8 hours PRN Cough  dextrose 40% Gel 15 Gram(s) Oral once PRN Blood Glucose LESS THAN 70 milliGRAM(s)/deciliter  glucagon  Injectable 1 milliGRAM(s) IntraMuscular once PRN Glucose LESS THAN 70 milligrams/deciliter  lactulose Syrup 10 Gram(s) Oral daily PRN constipation  saline laxative (FLEET) Rectal Enema 1 Enema Rectal once PRN if no BM by this evening 5/13/20      Allergies    No Known Allergies    Intolerances          VITALS   Vital Signs Last 24 Hrs  T(C): 36.5 (17 May 2020 17:12), Max: 36.9 (17 May 2020 07:45)  T(F): 97.7 (17 May 2020 17:12), Max: 98.5 (17 May 2020 07:45)  HR: 80 (17 May 2020 17:12) (62 - 80)  BP: 144/76 (17 May 2020 17:12) (119/60 - 144/76)  BP(mean): --  RR: 16 (17 May 2020 17:12) (16 - 16)  SpO2: 99% (17 May 2020 17:12) (96% - 99%)    RECENT LABS:             PTT - ( 14 May 2020 08:00 )  PTT:33.4 sec        CAPILLARY BLOOD GLUCOSE      POCT Blood Glucose.: 141 mg/dL (15 May 2020 12:04)  POCT Blood Glucose.: 137 mg/dL (15 May 2020 07:33)  POCT Blood Glucose.: 117 mg/dL (14 May 2020 22:13)  POCT Blood Glucose.: 111 mg/dL (14 May 2020 17:09)  POCT Blood Glucose.: 160 mg/dL (14 May 2020 12:23)          Review of Systems:   · Additional ROS	Patient appears in better spirtis. FS now controlled, Voiding well without dysuria. Last BM 2 days ago 5/13  No fever. Painin LE improved, participating more in therapy. no cough or SOB	    Physical Exam:   · Constitutional	detailed exam	  · Constitutional Comments	alert, was pleasant and cooperative NAD, aphasia expressive >receptive. Occasional errors in verbal yes/no but corrects with head nods/	  · Respiratory	detailed exam	  · Respiratory Details	good air movement; respirations non-labored; clear to auscultation bilaterally	  · Cardiovascular	detailed exam	  · Cardiovascular Details	regular rate and rhythm	  · Gastrointestinal	detailed exam	  · GI Normal	soft; nontender; bowel sounds normal	  · Extremities	detailed exam	  · Extremities Comments	improved swelling RLE no redness or warmth healing ecchymoses right lateral and medial lower leg no ankle swelling mild TTP compression distally

## 2020-05-17 NOTE — PROGRESS NOTE ADULT - ASSESSMENT
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 - Holding due to finding of SAH. HEAD CT 5/15 for determination start AC  - cont prozac for motor recovery. Increase to 20 mg daily for mood d/o , tolerating, improved  -neuropsychology supportive counseling  -right SAFO as outpatient. Continue ACe wrapping for foot drop for now  -PRECAUTIONS: airborne, contact, aspiration, cardiac, fall  -Head CT : new Mild subarachnoid hemorrhage in the left frontal lobe stable on repeat imaging 5/10  -repeat CT head  - STABLE NO NEW BLEED  -neuro consult appreciated . Hold off on MRI,  - Repeat Head CT 5/15 - Chronic infarctions in the LEFT frontal and parietal lobes with unchanged focus of subarachnoid hemorrhage in the LEFT frontal infarction. Minimal subarachnoid hemorrhage also seen within the RIGHT lateral perimesencephalic cistern. Old infarction again noted in the RIGHT cerebellum. Stable 2.5 cm arachnoid cyst in the anterior LEFT middle cranial fossa.    Next CT planned 20    #RLE pain improed  - Doppler negative DVT ,   -  CT RLE with finding of hematoma, vascular consulted would monitor for now, can continue dvt ppx given risk of COVID - appears improved on exam   - LE ANGEL performed - with +moderate PAD  -Xray right knee  negative for acute fracture  Xray right ankle  negative for fracture +calcaneal spur  -INR 1.18, similar to 4/10 (1.19)    #Acute Hypoxic Respiratory Failure secondary to COVID-19 PNA  Date of COVID testin20  - Hydroxychloroquine not given, pt with prolonged QTc  - EKG - QTc: 500 (), incomplete RBBB  - Incentive spirometry, robitussin DM, ocean spray  -patient tolerating therapies without O2  -COVID reswab NEGATIVE     #UTI  -completing macrobid 5/15  -urine cultures - +E coli >100k macrobid sensitive  -mild leukocytosis 11.72 . Afebrile, symptoms improved  CBC     #BRADY 2/2 to dehydration vs UTI  encourage po fluids. Monitor  14/0.82  imrpoved  BMP     #HTN  - Lopressor 25 q12, amlodipine 5 qd, hydralazine 50mg qd  controlled 5/15 117/69 - 149/85    #type II Mobitz block, known RBBB  - s/p Medtronic Micra AV+ leadless pacemaker  - outpt EP followup    #T2DM.  - SSI ACHS  -diabetic educator consult appreciated  continue metformin 100 mg BID  -lantus 20 units qhs  controlled 5/15    #HLD  - cont simvastatin 20 qhs    #insomnia  - melatonin 3    #Pain Mgmt   - Tylenol PRN    #GI/Bowel Mgmt   - Continent  - pepcid    #/Bladder Mgmt   - Continent, PVR negative    #FEN   - Diet - Regular + Thins  [[facility]]  DASH/TLC      #skin:  abrasion back. bacitracin and DSD daily     #- DVT PPX  - on hold for bleed    #Case discussed in IDT rounds :  max assist toilet transfers, stnad-pivot mod assist limited by pain, Mod assist transfers. Deficits i nproblem-solving, comprehension, memory, Has some improvement in insight which may also impact mood  -goals: mod assist bADLs, transfers, ambulation , requires 24 hours supervision  -patient dose not appear to have that level of support at home at this time. Will look for [facility] to continue Ot, PT, SLP to improve funciton once medically stable  COVID reswab     #LABS  CBC BMp   Repeat Head CT 5/15      addendum:    EXAM:  CT BRAIN      PROCEDURE DATE:  05/15/2020        INTERPRETATION:      CT head without IV contrast        CLINICAL INFORMATION:  Follow-up subarachnoid   Intracranial hemorrhage.    TECHNIQUE: Contiguous axial 5 mm sections were obtained through the head. Sagittal and coronal 2-D reformatted images were also obtained.   This scan was performed using automatic exposure control (radiation dose reduction software) to obtain a diagnostic image quality scan with patient dose as low as reasonably achievable.     FINDINGS:   2020 available for review.    The brain demonstrates chronic infarctions in the LEFT frontal and parietal lobes with unchanged focus of subarachnoid hemorrhage in the LEFT frontal infarction. Minimal subarachnoid hemorrhage also seen within the RIGHT lateral perimesencephalic cistern. Old infarction again noted in the RIGHT cerebellum. Stable 2.5 cm arachnoid cyst in the anterior LEFT middle cranial fossa.     No mass effect is found in the brain.      The ventricles, sulci and basal cisterns appear unremarkable.    The orbits are unremarkable.  The paranasal sinuses are clear.  The nasal cavity appears intact.  The nasopharynx is symmetric.  The central skull base, petrous temporal bones and calvarium remain intact.      IMPRESSION:   Chronic infarctions in the LEFT frontal and parietal lobes with unchanged focus of subarachnoid hemorrhage in the LEFT frontal infarction. Minimal subarachnoid hemorrhage also seen within the RIGHT lateral perimesencephalic cistern. Old infarction again noted in the RIGHT cerebellum. Stable 2.5 cm arachnoid cyst in the anterior LEFT middle cranial fossa.      -continue to hold lovenox and ASA  -will request heme consult as patient with areas of bleeding on brain as well as RLE               BRYN CRUZ M.D., ATTENDING RADIOLOGIST  This document has been electronically signed. May 15 2020  9:44AM              addendum 5/15/20 3:10 PM: neurology greatly appreciated. Will continue to monitor, repeat Head CT 20

## 2020-05-17 NOTE — PROGRESS NOTE ADULT - ASSESSMENT
Ms Ko is a pleasant 69 year-old female with hx of HTN, HLD, T2DM, CVA, GERD, RBBB with COVID-19 debility and Left frontal and parietal CVA. Currently admitted to [facility] for acute rehabilitation of her functional deficits. Repeat CT head this morning showing new small areas of bleeding. Neurology to comment. Hematology to comment on coagulation/bleeding disorder given persistent bleeding despite being off anticoagulation.     Neurology  Left frontal parietal CVA and SAH  - continue statin  - follow up with neurology regarding timing to resume ASA and DVT ppx  - repeat head CT showed new area of small SAH bleed  - will defer management recommendations to neurology    Pulmonary/ID  COVID-19  - was on empiric therapeutic lovenox for hypercoagulable state due to covid  - saturating 99 % on RA currently  - respiratory status stable, no need to resume full dose lovenox.     Cardiovascular  Hypertension  - BP controlled  - hydralazine, metoprolol, amlodipine   Mobitz type II AV block   - s/p PPM, ecg now with RBBB from pacing  RLE hematoma  - pain controlled and improving  - vascular sx: not likely compartment syndrome  - h/h stable  - off lovenox given above  - CBC stable and CPK normal as of 5/16    Renal  Hyponatremia.   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   - fluid restrict to 1.5L Daiy  - monitor bmp daily     Endocrine  DM II  - FS controlled, goal -180 while hospitalized  - metformin 1000mg q12, Lantus and ISS    Hematology  Leukocytosis  - mild without signs of infection  - continue to monitor  Normocytic Anemia  - likely due to blood loss given hematoma  - please obtain CBC today  Bleeding  - given continued bleeding as evidence on CT Head as well as hematoma formation. heme recs pending.     Diet: per primary  Pain control: per primary  DVT ppx: ambulation, SCD  Dispo: per primary  Case discussed with primary team  If a clinical question should arise regarding this patient, please do not hesitate to contact me at [phone] until 5pm on 5/17/2020

## 2020-05-18 LAB
ALBUMIN SERPL ELPH-MCNC: 3.7 G/DL — SIGNIFICANT CHANGE UP (ref 3.3–5)
ALP SERPL-CCNC: 55 U/L — SIGNIFICANT CHANGE UP (ref 40–120)
ALT FLD-CCNC: 16 U/L — SIGNIFICANT CHANGE UP (ref 10–45)
ANION GAP SERPL CALC-SCNC: 11 MMOL/L — SIGNIFICANT CHANGE UP (ref 5–17)
APTT BLD: 24.9 SEC — LOW (ref 27.5–36.3)
AST SERPL-CCNC: 18 U/L — SIGNIFICANT CHANGE UP (ref 10–40)
BILIRUB SERPL-MCNC: 0.5 MG/DL — SIGNIFICANT CHANGE UP (ref 0.2–1.2)
BUN SERPL-MCNC: 15 MG/DL — SIGNIFICANT CHANGE UP (ref 7–23)
CALCIUM SERPL-MCNC: 9.6 MG/DL — SIGNIFICANT CHANGE UP (ref 8.4–10.5)
CHLORIDE SERPL-SCNC: 99 MMOL/L — SIGNIFICANT CHANGE UP (ref 96–108)
CO2 SERPL-SCNC: 24 MMOL/L — SIGNIFICANT CHANGE UP (ref 22–31)
CREAT SERPL-MCNC: 0.92 MG/DL — SIGNIFICANT CHANGE UP (ref 0.5–1.3)
D DIMER BLD IA.RAPID-MCNC: 336 NG/ML DDU — HIGH
GLUCOSE SERPL-MCNC: 98 MG/DL — SIGNIFICANT CHANGE UP (ref 70–99)
HCT VFR BLD CALC: 32.9 % — LOW (ref 34.5–45)
HGB BLD-MCNC: 10.9 G/DL — LOW (ref 11.5–15.5)
INR BLD: 1.11 RATIO — SIGNIFICANT CHANGE UP (ref 0.88–1.16)
MCHC RBC-ENTMCNC: 29.2 PG — SIGNIFICANT CHANGE UP (ref 27–34)
MCHC RBC-ENTMCNC: 33.1 GM/DL — SIGNIFICANT CHANGE UP (ref 32–36)
MCV RBC AUTO: 88.2 FL — SIGNIFICANT CHANGE UP (ref 80–100)
NRBC # BLD: 0 /100 WBCS — SIGNIFICANT CHANGE UP (ref 0–0)
PLATELET # BLD AUTO: 520 K/UL — HIGH (ref 150–400)
POTASSIUM SERPL-MCNC: 4.4 MMOL/L — SIGNIFICANT CHANGE UP (ref 3.5–5.3)
POTASSIUM SERPL-SCNC: 4.4 MMOL/L — SIGNIFICANT CHANGE UP (ref 3.5–5.3)
PROT SERPL-MCNC: 7.4 G/DL — SIGNIFICANT CHANGE UP (ref 6–8.3)
PROTHROM AB SERPL-ACNC: 12.6 SEC — SIGNIFICANT CHANGE UP (ref 10–12.9)
RBC # BLD: 3.73 M/UL — LOW (ref 3.8–5.2)
RBC # FLD: 16.3 % — HIGH (ref 10.3–14.5)
SODIUM SERPL-SCNC: 134 MMOL/L — LOW (ref 135–145)
TROPONIN I SERPL-MCNC: <.017 NG/ML — LOW (ref 0.02–0.06)
WBC # BLD: 10.27 K/UL — SIGNIFICANT CHANGE UP (ref 3.8–10.5)
WBC # FLD AUTO: 10.27 K/UL — SIGNIFICANT CHANGE UP (ref 3.8–10.5)

## 2020-05-18 PROCEDURE — 93010 ELECTROCARDIOGRAM REPORT: CPT

## 2020-05-18 PROCEDURE — 99233 SBSQ HOSP IP/OBS HIGH 50: CPT

## 2020-05-18 PROCEDURE — 99223 1ST HOSP IP/OBS HIGH 75: CPT

## 2020-05-18 RX ORDER — FAMOTIDINE 10 MG/ML
20 INJECTION INTRAVENOUS ONCE
Refills: 0 | Status: COMPLETED | OUTPATIENT
Start: 2020-05-18 | End: 2020-05-18

## 2020-05-18 RX ADMIN — Medication 50 MILLIGRAM(S): at 05:08

## 2020-05-18 RX ADMIN — LIDOCAINE 1 PATCH: 4 CREAM TOPICAL at 12:02

## 2020-05-18 RX ADMIN — INSULIN GLARGINE 20 UNIT(S): 100 INJECTION, SOLUTION SUBCUTANEOUS at 21:25

## 2020-05-18 RX ADMIN — GABAPENTIN 300 MILLIGRAM(S): 400 CAPSULE ORAL at 21:25

## 2020-05-18 RX ADMIN — Medication 1 SPRAY(S): at 17:21

## 2020-05-18 RX ADMIN — ATORVASTATIN CALCIUM 10 MILLIGRAM(S): 80 TABLET, FILM COATED ORAL at 21:26

## 2020-05-18 RX ADMIN — Medication 1 APPLICATION(S): at 05:08

## 2020-05-18 RX ADMIN — Medication 25 MILLIGRAM(S): at 17:21

## 2020-05-18 RX ADMIN — FAMOTIDINE 20 MILLIGRAM(S): 10 INJECTION INTRAVENOUS at 17:20

## 2020-05-18 RX ADMIN — METFORMIN HYDROCHLORIDE 1000 MILLIGRAM(S): 850 TABLET ORAL at 08:01

## 2020-05-18 RX ADMIN — METFORMIN HYDROCHLORIDE 1000 MILLIGRAM(S): 850 TABLET ORAL at 17:20

## 2020-05-18 RX ADMIN — Medication 25 MILLIGRAM(S): at 05:08

## 2020-05-18 RX ADMIN — Medication 2: at 12:03

## 2020-05-18 RX ADMIN — Medication 1 APPLICATION(S): at 17:20

## 2020-05-18 RX ADMIN — Medication 100 MILLIGRAM(S): at 21:52

## 2020-05-18 RX ADMIN — Medication 1 SPRAY(S): at 05:09

## 2020-05-18 RX ADMIN — Medication 650 MILLIGRAM(S): at 21:24

## 2020-05-18 RX ADMIN — LIDOCAINE 1 PATCH: 4 CREAM TOPICAL at 18:36

## 2020-05-18 RX ADMIN — FAMOTIDINE 20 MILLIGRAM(S): 10 INJECTION INTRAVENOUS at 08:35

## 2020-05-18 RX ADMIN — Medication 1 APPLICATION(S): at 13:39

## 2020-05-18 RX ADMIN — Medication 100 MILLIGRAM(S): at 09:51

## 2020-05-18 RX ADMIN — AMLODIPINE BESYLATE 5 MILLIGRAM(S): 2.5 TABLET ORAL at 05:08

## 2020-05-18 RX ADMIN — Medication 1 SPRAY(S): at 12:29

## 2020-05-18 RX ADMIN — Medication 20 MILLIGRAM(S): at 12:02

## 2020-05-18 NOTE — PROGRESS NOTE ADULT - ASSESSMENT
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 - Holding due to finding of SAH. HEAD CT  for determination start AC  - cont prozac for motor recovery. Increase to 20 mg daily for mood d/o , tolerating, improved  -neuropsychology supportive counseling  -right SAFO as outpatient. Continue ACe wrapping for foot drop for now  -PRECAUTIONS: airborne, contact, aspiration, cardiac, fall  -Head CT : new Mild subarachnoid hemorrhage in the left frontal lobe stable on repeat imaging 5/10  -repeat CT head  - STABLE NO NEW BLEED, 5/15 - Left SAH and new righ tlateral perimesencephalic SAH. stable 2.5 cm L arachnoid cyst.  -neuro consult appreciated 5/15. Hold off on MRI,  - Repeat Head CT  pending  -Heme/onc consulted for ?hypo-coagulopathy    #RLE pain improved  - Doppler negative DVT ,   -  CT RLE with finding of hematoma, vascular consulted would monitor for now, can continue dvt ppx given risk of COVID - appears improved on exam   - LE ANGEL performed - with +moderate PAD  -Xray right knee  negative for acute fracture  Xray right ankle  negative for fracture +calcaneal spur    #Acute Hypoxic Respiratory Failure secondary to COVID-19 PNA  Date of COVID testin20  - Hydroxychloroquine not given, pt with prolonged QTc  - EKG - QTc: 500 (), incomplete RBBB  - Incentive spirometry, robitussin DM, ocean spray  -patient tolerating therapies without O2  -COVID reswab NEGATIVE     #UTI  -completing macrobid 5/15  -urine cultures - +E coli >100k macrobid sensitive  -mild leukocytosis 11.72 . Afebrile, symptoms improved  CBC  - 10.27 stable     #BRADY 2/2 to dehydration vs UTI - resolved  - encourage po fluids - monitor renal indices  stable    #HTN  - Lopressor 25 q12, amlodipine 5 qd, hydralazine 50mg qd  controlled  (107/63 - 144/76)    #type II Mobitz block, known RBBB  - s/p Medtronic Micra AV+ leadless pacemaker  - outpt EP followup    #T2DM.  - SSI ACHS  -diabetic educator consult appreciated  continue metformin 100 mg BID  -lantus 20 units qhs  controlled     #HLD  - cont simvastatin 20 qhs    #insomnia  - melatonin 3    #Pain Mgmt   - Tylenol PRN    #GI/Bowel Mgmt   - Continent  - pepcid    #/Bladder Mgmt   - Continent, PVR negative    #FEN   - Diet - Regular + Thins  [CCHO]  DASH/TLC      #skin:  abrasion back. bacitracin and DSD daily     #- DVT PPX  - on hold for bleed    #Case discussed in IDT rounds :  max assist toilet transfers, stnad-pivot mod assist limited by pain, Mod assist transfers. Deficits i nproblem-solving, comprehension, memory, Has some improvement in insight which may also impact mood  -goals: mod assist bADLs, transfers, ambulation , requires 24 hours supervision  -patient dose not appear to have that level of support at home at this time. 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 - Holding due to finding of SAH. HEAD CT  for determination start AC  - cont prozac for motor recovery. Increase to 20 mg daily for mood d/o , tolerating, improved  -neuropsychology supportive counseling  -right SAFO as outpatient. Continue ACe wrapping for foot drop for now  -PRECAUTIONS: airborne, contact, aspiration, cardiac, fall  -Head CT : new Mild subarachnoid hemorrhage in the left frontal lobe stable on repeat imaging 5/10  -repeat CT head  - STABLE NO NEW BLEED, 5/15 - Left SAH and new righ tlateral perimesencephalic SAH. stable 2.5 cm L arachnoid cyst.  -neuro consult appreciated 5/15. Hold off on MRI,  - Repeat Head CT  pending  -Heme/onc consulted for ?hypo-coagulopathy    #Chest pain   -EKG NSR, troponin negative, DDimer downtrending  -appears musculoskeletal & 2/2 to positioning  -encourage OOB, tylenol prn pain    #RLE pain improved  - Doppler negative DVT ,   -  CT RLE with finding of hematoma, vascular consulted would monitor for now, can continue dvt ppx given risk of COVID - appears improved on exam   - LE ANGEL performed - with +moderate PAD  -Xray right knee  negative for acute fracture  Xray right ankle  negative for fracture +calcaneal spur    #Acute Hypoxic Respiratory Failure secondary to COVID-19 PNA  Date of COVID testin20  - Hydroxychloroquine not given, pt with prolonged QTc  - EKG - QTc: 500 (), incomplete RBBB  - Incentive spirometry, robitussin DM, ocean spray  -patient tolerating therapies without O2  -COVID reswab NEGATIVE     #UTI  -completing macrobid 5/15  -urine cultures - +E coli >100k macrobid sensitive  -mild leukocytosis 11.72 . Afebrile, symptoms improved  CBC  - 10.27 stable     #BRADY 2/2 to dehydration vs UTI - resolved  - encourage po fluids - monitor renal indices  stable    #HTN  - Lopressor 25 q12, amlodipine 5 qd, hydralazine 50mg qd  controlled  (107/63 - 144/76)    #type II Mobitz block, known RBBB  - s/p Medtronic Micra AV+ leadless pacemaker  - outpt EP followup    #T2DM.  - SSI ACHS  -diabetic educator consult appreciated  continue metformin 100 mg BID  -lantus 20 units qhs  controlled     #HLD  - cont simvastatin 20 qhs    #insomnia  - melatonin 3    #Pain Mgmt   - Tylenol PRN    #GI/Bowel Mgmt   - Continent  - pepcid    #/Bladder Mgmt   - Continent, PVR negative    #FEN   - Diet - Regular + Thins  [CCHO]  DASH/TLC      #skin:  abrasion back. bacitracin and DSD daily     #- DVT PPX  - on hold for bleed    #Case discussed in IDT rounds :  max assist toilet transfers, stnad-pivot mod assist limited by pain, Mod assist transfers. Deficits i nproblem-solving, comprehension, memory, Has some improvement in insight which may also impact mood  -goals: mod assist bADLs, transfers, ambulation , requires 24 hours supervision  -patient dose not appear to have that level of support at home at this time. 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 - Holding due to finding of SAH. HEAD CT  for determination start AC, patient aware  - cont prozac for motor recovery. Increase to 20 mg daily for mood d/o , tolerating, improved  -neuropsychology supportive counseling  -right SAFO as outpatient. Continue ACe wrapping for foot drop for now  -Head CT : new Mild subarachnoid hemorrhage in the left frontal lobe stable on repeat imaging 5/10  -repeat CT head  - STABLE NO NEW BLEED  -Head CT, 5/15 - Left SAH and new righ tlateral perimesencephalic SAH. stable 2.5 cm L arachnoid cyst.  -neuro consult appreciated 5/15. Hold off on MRI,  - Repeat Head CT  pending  -Heme/onc consulted for ?hypo-coagulopathy  -PRECAUTIONS: airborne, contact, aspiration, cardiac, fall    #Chest pain   -EKG NSR, troponin negative, DDimer downtrending  -appears musculoskeletal & 2/2 to positioning  -encourage OOB, tylenol prn pain    #RLE pain improved  - Doppler negative DVT ,   -  CT RLE with finding of hematoma, vascular consulted would monitor for now, can continue dvt ppx given risk of COVID - appears improved on exam   - LE ANGEL performed - with +moderate PAD  -Xray right knee  negative for acute fracture  Xray right ankle  negative for fracture +calcaneal spur    #Acute Hypoxic Respiratory Failure secondary to COVID-19 PNA  Date of COVID testin20  - Hydroxychloroquine not given, pt with prolonged QTc  - EKG - QTc: 500 (), incomplete RBBB  - Incentive spirometry, robitussin DM, ocean spray  -patient tolerating therapies without O2  -COVID reswab NEGATIVE     #UTI  -completed macrobid 5/15  -urine cultures - +E coli >100k macrobid sensitive  -mild leukocytosis 11.72 . Afebrile, symptoms improved  CBC  - 10.27 stable     #BRADY 2/2 to dehydration vs UTI - resolved  - encourage po fluids - monitor renal indices  stable    #HTN  - Lopressor 25 q12, amlodipine 5 qd, hydralazine 50mg qd  controlled  (107/63 - 144/76)    #type II Mobitz block, known RBBB  - s/p Medtronic Micra AV+ leadless pacemaker  - outpt EP followup    #T2DM.  - SSI ACHS  -diabetic educator consult appreciated  continue metformin 100 mg BID  -lantus 20 units qhs  controlled     #HLD  - cont simvastatin 20 qhs    #insomnia  - melatonin 3    #Pain Mgmt   - Tylenol PRN    #GI/Bowel Mgmt   - Continent  - pepcid    #/Bladder Mgmt   - Continent, PVR negative    #FEN   - Diet - Regular + Thins  [CCHO]  DASH/TLC      #skin:  abrasion back. bacitracin and DSD daily     #- DVT PPX  - on hold for bleed. Repeat Head CT     #Case discussed in IDT rounds :  max assist toilet transfers, stnad-pivot mod assist limited by pain, Mod assist transfers. Deficits i nproblem-solving, comprehension, memory, Has some improvement in insight which may also impact mood  -goals: mod assist bADLs, transfers, ambulation , requires 24 hours supervision  -patient dose not appear to have that level of support at home at this time. 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 - Holding due to finding of SAH. HEAD CT  for determination start AC, patient aware  - cont prozac for motor recovery. Increase to 20 mg daily for mood d/o , tolerating, improved  -neuropsychology supportive counseling  -right SAFO as outpatient. Continue ACe wrapping for foot drop for now  -Head CT : new Mild subarachnoid hemorrhage in the left frontal lobe stable on repeat imaging 5/10  -repeat CT head  - STABLE NO NEW BLEED  -Head CT, 5/15 - Left SAH and new righ tlateral perimesencephalic SAH. stable 2.5 cm L arachnoid cyst.  -neuro consult appreciated 5/15. Hold off on MRI,  - Repeat Head CT  pending  -Heme/onc consulted for ?hypo-coagulopathy  -PRECAUTIONS: airborne, contact, aspiration, cardiac, fall    #Chest pain   -EKG NSR, troponin negative, DDimer downtrending  -appears musculoskeletal & 2/2 to positioning  -encourage OOB, tylenol prn pain    #RLE pain improved  - Doppler negative DVT ,   -  CT RLE with finding of hematoma, vascular consulted would monitor for now, can continue dvt ppx given risk of COVID - appears improved on exam   - LE ANGEL performed - with +moderate PAD  -Xray right knee  negative for acute fracture  Xray right ankle  negative for fracture +calcaneal spur    #Acute Hypoxic Respiratory Failure secondary to COVID-19 PNA  Date of COVID testin20  - Hydroxychloroquine not given, pt with prolonged QTc  - EKG - QTc: 500 (), incomplete RBBB  - Incentive spirometry, robitussin DM, ocean spray  -patient tolerating therapies without O2  -COVID reswab NEGATIVE     #UTI  -completed macrobid 5/15  -urine cultures - +E coli >100k macrobid sensitive  -mild leukocytosis 11.72 . Afebrile, symptoms improved  CBC  - 10.27 stable     #BRADY 2/2 to dehydration vs UTI - resolved  - encourage po fluids - monitor renal indices  stable    #HTN  - Lopressor 25 q12, amlodipine 5 qd, hydralazine 50mg qd  controlled  (107/63 - 144/76)    #type II Mobitz block, known RBBB  - s/p Medtronic Micra AV+ leadless pacemaker  - outpt EP followup    #T2DM.  - SSI ACHS  -diabetic educator consult appreciated  continue metformin 100 mg BID  -lantus 20 units qhs  controlled     #HLD  - cont simvastatin 20 qhs    #insomnia  - melatonin 3    #Pain Mgmt   - Tylenol PRN    #GI/Bowel Mgmt   - Continent  - pepcid    #/Bladder Mgmt   - Continent, PVR negative    #FEN   - Diet - Regular + Thins  [CCHO]  DASH/TLC      #skin:  abrasion back. bacitracin and DSD daily     #- DVT PPX  - on hold for bleed. Repeat Head CT     #Case discussed in IDT rounds :  max assist toilet transfers, stnad-pivot mod assist limited by pain, Mod assist transfers. Deficits i nproblem-solving, comprehension, memory, Has some improvement in insight which may also impact mood  -goals: mod assist bADLs, transfers, ambulation , requires 24 hours supervision  -patient dose not appear to have that level of support at home at this time. 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 - Holding due to finding of SAH. HEAD CT  for determination start AC, patient aware  - cont prozac for motor recovery. Increase to 20 mg daily for mood d/o , tolerating, improved  -neuropsychology supportive counseling  -right SAFO as outpatient. Continue ACe wrapping for foot drop for now  -Head CT : new Mild subarachnoid hemorrhage in the left frontal lobe stable on repeat imaging 5/10  -repeat CT head  - STABLE NO NEW BLEED  -Head CT, 5/15 - Left SAH and new righ tlateral perimesencephalic SAH. stable 2.5 cm L arachnoid cyst.  -neuro consult appreciated 5/15. Hold off on MRI,  - Repeat Head CT  pending  -Heme/onc consulted for ?hypo-coagulopathy:. 518 consult read and appreciated:  ·	Coagulopathy: agreed with holding anticoagulation, stroke likely secondary to COVID 19+  ·	Bleeding diathesis: Will check factor V,  VII, VIII, IX, XII, XIII. fibrinogen, coags, VWD.   ·	Give Vitamin K 10 mg daily x 2 doses.      -PRECAUTIONS: airborne, contact, aspiration, cardiac, fall    #Chest pain   -EKG NSR, troponin negative, DDimer downtrending  -appears musculoskeletal & 2/2 to positioning  -encourage OOB, tylenol prn pain    #RLE pain improved  - Doppler negative DVT ,   -  CT RLE with finding of hematoma, vascular consulted would monitor for now, can continue dvt ppx given risk of COVID - appears improved on exam   - LE ANGEL performed - with +moderate PAD  -Xray right knee  negative for acute fracture  Xray right ankle  negative for fracture +calcaneal spur    #Acute Hypoxic Respiratory Failure secondary to COVID-19 PNA  Date of COVID testin20  - Hydroxychloroquine not given, pt with prolonged QTc  - EKG - QTc: 500 (), incomplete RBBB  - Incentive spirometry, robitussin DM, ocean spray  -patient tolerating therapies without O2  -COVID reswab NEGATIVE     #UTI  -completed macrobid 5/15  -urine cultures - +E coli >100k macrobid sensitive  -mild leukocytosis 11.72 . Afebrile, symptoms improved  CBC  - 10.27 stable     #BRADY 2/2 to dehydration vs UTI - resolved  - encourage po fluids - monitor renal indices  stable    #HTN  - Lopressor 25 q12, amlodipine 5 qd, hydralazine 50mg qd  controlled  (107/63 - 144/76)    #type II Mobitz block, known RBBB  - s/p Medtronic Micra AV+ leadless pacemaker  - outpt EP followup    #T2DM.  - SSI ACHS  -diabetic educator consult appreciated  continue metformin 100 mg BID  -lantus 20 units qhs  controlled     #HLD  - cont simvastatin 20 qhs    #insomnia  - melatonin 3    #Pain Mgmt   - Tylenol PRN    #GI/Bowel Mgmt   - Continent  - pepcid    #/Bladder Mgmt   - Continent, PVR negative    #FEN   - Diet - Regular + Thins  [CCHO]  DASH/TLC      #skin:  abrasion back. bacitracin and DSD daily     #- DVT PPX  - on hold for bleed. Repeat Head CT     #Case discussed in IDT rounds :  max assist toilet transfers, stnad-pivot mod assist limited by pain, Mod assist transfers. Deficits i nproblem-solving, comprehension, memory, Has some improvement in insight which may also impact mood  -goals: mod assist bADLs, transfers, ambulation , requires 24 hours supervision  -patient dose not appear to have that level of support at home at this time. Will look for [facility] to continue Ot, PT, SLP to improve funciton once medically stable      #LABS  CBC BMp   Repeat Head CT

## 2020-05-18 NOTE — CONSULT NOTE ADULT - SUBJECTIVE AND OBJECTIVE BOX
70yo female RH dominant with PMH of HTN, HLD, T2DM, hx CVA, GERD, known RBBB presented to [facility] ED on 4/8/20 with complaints of fever, chills, cough, chest pain, SOB for 5 days. Per patient, her  tested +COVID at an outpatient testing facility a few days prior, patient herself had tested negative at the time. ED workup showed +COVID and Mobitz type II block. EP was consulted and patient is s/p Medtronic Micra AV+ leadless pacemaker implantation via right femoral vein on 4/10/20.   On 4/16/20, patient developed acute aphasia and right sided weakness. CT head showed an acute LEFT frontal/parietal CVA. CTA head/neck showed LEFT ICA stenosis but no occlusion. tPA was not administered due to timing and no penumbra. Placed on full dose lovenox as thoguht to be hypercoagulable secondary to COVID.  Transferred to [place] for acute inpatient rehab services 4/23/20.    Repeat CT head this morning showing new small areas of bleeding.  Hematology consulted  for coagulopathy. R/o bleeding disorder given persistent bleeding despite being off anticoagulation.       PAST MEDICAL HISTORY:  DM (diabetes mellitus)     H/O gastroesophageal reflux (GERD)     HTN (hypertension).     PAST SURGICAL HISTORY:  History of benign brain tumor.     FAMILY HISTORY:  FH: asthma,     MEDICATIONS  (STANDING):  amLODIPine   Tablet 5 milliGRAM(s) Oral daily  ammonium lactate 12% Lotion 1 Application(s) Topical two times a day  atorvastatin 10 milliGRAM(s) Oral at bedtime  BACItracin   Ointment 1 Application(s) Topical daily  dextrose 5%. 1000 milliLiter(s) (50 mL/Hr) IV Continuous <Continuous>  dextrose 50% Injectable 12.5 Gram(s) IV Push once  dextrose 50% Injectable 25 Gram(s) IV Push once  dextrose 50% Injectable 25 Gram(s) IV Push once  famotidine    Tablet 20 milliGRAM(s) Oral daily  FLUoxetine 20 milliGRAM(s) Oral daily  gabapentin 300 milliGRAM(s) Oral at bedtime  hydrALAZINE 50 milliGRAM(s) Oral daily  insulin glargine Injectable (LANTUS) 20 Unit(s) SubCutaneous at bedtime  insulin lispro (HumaLOG) corrective regimen sliding scale   SubCutaneous three times a day before meals  insulin lispro (HumaLOG) corrective regimen sliding scale   SubCutaneous at bedtime  lidocaine   Patch 1 Patch Transdermal daily  melatonin 3 milliGRAM(s) Oral at bedtime  metFORMIN 1000 milliGRAM(s) Oral two times a day with meals  metoprolol tartrate 25 milliGRAM(s) Oral every 12 hours  polyethylene glycol 3350 17 Gram(s) Oral daily  sodium chloride 0.65% Nasal 1 Spray(s) Both Nostrils four times a day  sodium chloride 0.9% Bolus 1000 milliLiter(s) IV Bolus once    MEDICATIONS  (PRN):  acetaminophen   Tablet .. 650 milliGRAM(s) Oral every 6 hours PRN Temp greater or equal to 38C (100.4F), Mild Pain (1 - 3)  benzonatate 100 milliGRAM(s) Oral every 8 hours PRN Cough  dextrose 40% Gel 15 Gram(s) Oral once PRN Blood Glucose LESS THAN 70 milliGRAM(s)/deciliter  glucagon  Injectable 1 milliGRAM(s) IntraMuscular once PRN Glucose LESS THAN 70 milligrams/deciliter  lactulose Syrup 10 Gram(s) Oral daily PRN constipation  saline laxative (FLEET) Rectal Enema 1 Enema Rectal once PRN if no BM by this evening 5/13/20      ICU Vital Signs Last 24 Hrs  T(C): 36.7 (18 May 2020 08:15), Max: 36.9 (17 May 2020 21:11)  T(F): 98 (18 May 2020 08:15), Max: 98.5 (17 May 2020 21:11)  HR: 66 (18 May 2020 08:15) (66 - 80)  BP: 107/63 (18 May 2020 08:15) (107/63 - 144/76)  BP(mean): --  ABP: --  ABP(mean): --  RR: 17 (18 May 2020 08:15) (16 - 17)  SpO2: 96% (18 May 2020 08:15) (96% - 99%)      PHYSICAL EXAM:  Gen: nad, resting in bed  Neuro: aaox3, right LE 0/5  Heent: eomi b/l, no jvd, no oral exudates  Pulm: cta b/l, no w/r/r  CV: +s1s2, no m/r/g  Ab: soft, nt/nd, normoactive bs x 4  Extremities: no edema, pulses intact and equal.     D-Dimer Assay, Quantitative (05.18.20 @ 09:45)    D-Dimer Assay, Quantitative: 336 ng/mL DDU    Comprehensive Metabolic Panel in AM (05.18.20 @ 06:00)    Sodium, Serum: 134 mmol/L    Potassium, Serum: 4.4 mmol/L    Chloride, Serum: 99 mmol/L    Carbon Dioxide, Serum: 24 mmol/L    Anion Gap, Serum: 11 mmol/L    Blood Urea Nitrogen, Serum: 15 mg/dL    Creatinine, Serum: 0.92 mg/dL    Glucose, Serum: 98 mg/dL    Calcium, Total Serum: 9.6 mg/dL    Protein Total, Serum: 7.4 g/dL    Albumin, Serum: 3.7 g/dL    Bilirubin Total, Serum: 0.5 mg/dL    Alkaline Phosphatase, Serum: 55 U/L    Aspartate Aminotransferase (AST/SGOT): 18 U/L    Alanine Aminotransferase (ALT/SGPT): 16 U/L    eGFR if Non : 64: Interpretative comment  The units for eGFR are mL/min/1.73M2 (normalized body surface area). The  eGFR is calculated from a serum creatinine using the CKD-EPI equation.  Other variables required for calculation are race, age and sex. Among  patients with chronic kidney disease (CKD), the eGFR is useful in  determining the stage of disease according to KDOQI CKD classification.  All eGFR results are reported numerically with the following  interpretation.          GFR                    With                 Without     (ml/min/1.73 m2)    Kidney Damage       Kidney Damage        >= 90                    Stage 1                     Normal        60-89                    Stage 2                     Decreased GFR        30-59     Stage 3                     Stage 3        15-29                    Stage 4                     Stage 4        < 15                      Stage 5                     Stage 5  Each stage of CKD assumes that the associated GFR level has been in  effect for at least 3 months. Determination of stages one and two (with  eGFR > 59 ml/min/m2) requires estimation of kidney damage for at least 3  months as defined by structural or functional abnormalities.  Limitations: All estimates of GFR will be less accurate for patients at  extremes of muscle mass (including but not limited to frail elderly,  critically ill, or cancer patients), those with unusual diets, and those  with conditions associated with reduced secretion or extrarenal  elimination of creatinine. The eGFR equation is not recommended for use  in patients with unstable creatinine levels. mL/min/1.73M2    eGFR if African American: 74 mL/min/1.73M2        Complete Blood Count + Automated Diff (05.18.20 @ 06:00)    WBC Count: 10.27 K/uL    RBC Count: 3.73 M/uL    Hemoglobin: 10.9 g/dL    Hematocrit: 32.9 %    Mean Cell Volume: 88.2 fl    Mean Cell Hemoglobin: 29.2 pg    Mean Cell Hemoglobin Conc: 33.1 gm/dL    Red Cell Distrib Width: 16.3 %    Platelet Count - Automated: 520 K/uL    Nucleated RBC: 0 /100 WBCs

## 2020-05-18 NOTE — CONSULT NOTE ADULT - CONSULT REASON
Post COVID-19
Coagulopathy
Evaluate LE circulation
Stroke with Covid 19 (+) and ecchymosis right calf
Uncontrolled Type 2 Diabetes w/ hyperglycemia

## 2020-05-18 NOTE — PROGRESS NOTE ADULT - RS GEN PE MLT RESP DETAILS PC
good air movement/normal/respirations non-labored/airway patent/breath sounds equal/clear to auscultation bilaterally

## 2020-05-18 NOTE — PROGRESS NOTE ADULT - COMMENTS
Patient with complaint of CP that started spontaneously today morning. Pain is mid sternal/xyphoid Denies N/V/SOB, chest pressure/ PLeuritic pain, constipation, burning, diaphoresis, jaw pain. EKG at bedside NSR. Patient states that right ankle/knee pain has improved since last week. According to PT, patient has been refusing out of bed therapies over the weekend. Denies HA. Patient with complaint of CP that started spontaneously today morning. Pain is mid sternal/xyphoid Denies N/V/SOB, chest pressure/ PLeuritic pain, constipation, burning, diaphoresis, jaw pain. EKG at bedside NSR. Patient states that right ankle/knee pain has improved since last week. According to PT, patient has been refusing out of bed therapies over the weekend. Denies HA.    Seen with language line  in [place] 8:30 AM. Patient admits to feeling sad ; is not going home but for [facility] after IR[facility] stay.Does not looks distressed, no respiratory or cardiac distress time. No dysuria or constipation. No N/V or H/A

## 2020-05-18 NOTE — PROGRESS NOTE ADULT - SUBJECTIVE AND OBJECTIVE BOX
Patient is a 69y old  Female who presents with a chief complaint of left frontal parietal CVA with right HP and aphasia, COVID+ 4/8/20 (17 May 2020 16:03)      HPI:  SHIREEN CASH is a 70yo female RH dominant with PMH of HTN, HLD, T2DM, hx CVA, GERD, known RBBB presented to [facility] ED on 4/8/20 with complaints of fever, chills, cough, chest pain, SOB for 5 days. Per patient, her  tested +COVID at an outpatient testing facility a few days prior, patient herself had tested negative at the time. They continued to share the same living space. ED workup showed +COVID and Mobitz type II block. EP was consulted and patient is s/p Medtronic Micra AV+ leadless pacemaker implantation via right femoral vein on 4/10/20. Hydroxychloroquine presumably not given due to prolonged QTc.     On 4/16/20, patient developed acute aphasia and right sided weakness. CT head showed an acute LEFT frontal/parietal CVA. CTA head/neck showed LEFT ICA stenosis but no occlusion. tPA was not administered due to timing and no penumbra. Placed on full dose lovenox as thoguht to be hypercoagulable secondary to COVID. Patient was cleared for regular consistency solids, thin liquids. Transferred to [place] for acute inpatient rehab services 4/23/20. (23 Apr 2020 14:26)      PAST MEDICAL & SURGICAL HISTORY:  H/O gastroesophageal reflux (GERD)  HTN (hypertension)  DM (diabetes mellitus)  History of benign brain tumor      MEDICATIONS  (STANDING):  amLODIPine   Tablet 5 milliGRAM(s) Oral daily  ammonium lactate 12% Lotion 1 Application(s) Topical two times a day  atorvastatin 10 milliGRAM(s) Oral at bedtime  BACItracin   Ointment 1 Application(s) Topical daily  dextrose 5%. 1000 milliLiter(s) (50 mL/Hr) IV Continuous <Continuous>  dextrose 50% Injectable 12.5 Gram(s) IV Push once  dextrose 50% Injectable 25 Gram(s) IV Push once  dextrose 50% Injectable 25 Gram(s) IV Push once  famotidine    Tablet 20 milliGRAM(s) Oral daily  FLUoxetine 20 milliGRAM(s) Oral daily  gabapentin 300 milliGRAM(s) Oral at bedtime  hydrALAZINE 50 milliGRAM(s) Oral daily  insulin glargine Injectable (LANTUS) 20 Unit(s) SubCutaneous at bedtime  insulin lispro (HumaLOG) corrective regimen sliding scale   SubCutaneous three times a day before meals  insulin lispro (HumaLOG) corrective regimen sliding scale   SubCutaneous at bedtime  lidocaine   Patch 1 Patch Transdermal daily  melatonin 3 milliGRAM(s) Oral at bedtime  metFORMIN 1000 milliGRAM(s) Oral two times a day with meals  metoprolol tartrate 25 milliGRAM(s) Oral every 12 hours  polyethylene glycol 3350 17 Gram(s) Oral daily  sodium chloride 0.65% Nasal 1 Spray(s) Both Nostrils four times a day  sodium chloride 0.9% Bolus 1000 milliLiter(s) IV Bolus once    MEDICATIONS  (PRN):  acetaminophen   Tablet .. 650 milliGRAM(s) Oral every 6 hours PRN Temp greater or equal to 38C (100.4F), Mild Pain (1 - 3)  benzonatate 100 milliGRAM(s) Oral every 8 hours PRN Cough  dextrose 40% Gel 15 Gram(s) Oral once PRN Blood Glucose LESS THAN 70 milliGRAM(s)/deciliter  glucagon  Injectable 1 milliGRAM(s) IntraMuscular once PRN Glucose LESS THAN 70 milligrams/deciliter  lactulose Syrup 10 Gram(s) Oral daily PRN constipation  saline laxative (FLEET) Rectal Enema 1 Enema Rectal once PRN if no BM by this evening 5/13/20      Allergies    No Known Allergies    Intolerances            PHYSICAL EXAM  69y  Vital Signs Last 24 Hrs  T(C): 36.7 (18 May 2020 08:15), Max: 36.9 (17 May 2020 21:11)  T(F): 98 (18 May 2020 08:15), Max: 98.5 (17 May 2020 21:11)  HR: 66 (18 May 2020 08:15) (66 - 80)  BP: 107/63 (18 May 2020 08:15) (107/63 - 144/76)  BP(mean): --  RR: 17 (18 May 2020 08:15) (16 - 17)  SpO2: 96% (18 May 2020 08:15) (96% - 99%)  Daily     Daily       RECENT LABS:                          10.9   10.27 )-----------( 520      ( 18 May 2020 06:00 )             32.9     05-18    134<L>  |  99  |  15  ----------------------------<  98  4.4   |  24  |  0.92    Ca    9.6      18 May 2020 06:00    TPro  7.4  /  Alb  3.7  /  TBili  0.5  /  DBili  x   /  AST  18  /  ALT  16  /  AlkPhos  55  05-18    LIVER FUNCTIONS - ( 18 May 2020 06:00 )  Alb: 3.7 g/dL / Pro: 7.4 g/dL / ALK PHOS: 55 U/L / ALT: 16 U/L / AST: 18 U/L / GGT: x                   CAPILLARY BLOOD GLUCOSE      POCT Blood Glucose.: 126 mg/dL (18 May 2020 07:12)  POCT Blood Glucose.: 146 mg/dL (17 May 2020 20:59)  POCT Blood Glucose.: 131 mg/dL (17 May 2020 16:50) Patient is a 69y old  Female who presents with a chief complaint of left frontal parietal CVA with right HP and aphasia, COVID+ 4/8/20 (17 May 2020 16:03)      HPI:  SHIREEN CASH is a 70yo female RH dominant with PMH of HTN, HLD, T2DM, hx CVA, GERD, known RBBB presented to [facility] ED on 4/8/20 with complaints of fever, chills, cough, chest pain, SOB for 5 days. Per patient, her  tested +COVID at an outpatient testing facility a few days prior, patient herself had tested negative at the time. They continued to share the same living space. ED workup showed +COVID and Mobitz type II block. EP was consulted and patient is s/p Medtronic Micra AV+ leadless pacemaker implantation via right femoral vein on 4/10/20. Hydroxychloroquine presumably not given due to prolonged QTc.     On 4/16/20, patient developed acute aphasia and right sided weakness. CT head showed an acute LEFT frontal/parietal CVA. CTA head/neck showed LEFT ICA stenosis but no occlusion. tPA was not administered due to timing and no penumbra. Placed on full dose lovenox as thoguht to be hypercoagulable secondary to COVID. Patient was cleared for regular consistency solids, thin liquids. Transferred to [place] for acute inpatient rehab services 4/23/20. (23 Apr 2020 14:26)      PAST MEDICAL & SURGICAL HISTORY:  H/O gastroesophageal reflux (GERD)  HTN (hypertension)  DM (diabetes mellitus)  History of benign brain tumor      MEDICATIONS  (STANDING):  amLODIPine   Tablet 5 milliGRAM(s) Oral daily  ammonium lactate 12% Lotion 1 Application(s) Topical two times a day  atorvastatin 10 milliGRAM(s) Oral at bedtime  BACItracin   Ointment 1 Application(s) Topical daily  dextrose 5%. 1000 milliLiter(s) (50 mL/Hr) IV Continuous <Continuous>  dextrose 50% Injectable 12.5 Gram(s) IV Push once  dextrose 50% Injectable 25 Gram(s) IV Push once  dextrose 50% Injectable 25 Gram(s) IV Push once  famotidine    Tablet 20 milliGRAM(s) Oral daily  FLUoxetine 20 milliGRAM(s) Oral daily  gabapentin 300 milliGRAM(s) Oral at bedtime  hydrALAZINE 50 milliGRAM(s) Oral daily  insulin glargine Injectable (LANTUS) 20 Unit(s) SubCutaneous at bedtime  insulin lispro (HumaLOG) corrective regimen sliding scale   SubCutaneous three times a day before meals  insulin lispro (HumaLOG) corrective regimen sliding scale   SubCutaneous at bedtime  lidocaine   Patch 1 Patch Transdermal daily  melatonin 3 milliGRAM(s) Oral at bedtime  metFORMIN 1000 milliGRAM(s) Oral two times a day with meals  metoprolol tartrate 25 milliGRAM(s) Oral every 12 hours  polyethylene glycol 3350 17 Gram(s) Oral daily  sodium chloride 0.65% Nasal 1 Spray(s) Both Nostrils four times a day  sodium chloride 0.9% Bolus 1000 milliLiter(s) IV Bolus once    MEDICATIONS  (PRN):  acetaminophen   Tablet .. 650 milliGRAM(s) Oral every 6 hours PRN Temp greater or equal to 38C (100.4F), Mild Pain (1 - 3)  benzonatate 100 milliGRAM(s) Oral every 8 hours PRN Cough  dextrose 40% Gel 15 Gram(s) Oral once PRN Blood Glucose LESS THAN 70 milliGRAM(s)/deciliter  glucagon  Injectable 1 milliGRAM(s) IntraMuscular once PRN Glucose LESS THAN 70 milligrams/deciliter  lactulose Syrup 10 Gram(s) Oral daily PRN constipation  saline laxative (FLEET) Rectal Enema 1 Enema Rectal once PRN if no BM by this evening 5/13/20      Allergies    No Known Allergies    Intolerances            PHYSICAL EXAM  69y  Vital Signs Last 24 Hrs  T(C): 36.7 (18 May 2020 08:15), Max: 36.9 (17 May 2020 21:11)  T(F): 98 (18 May 2020 08:15), Max: 98.5 (17 May 2020 21:11)  HR: 66 (18 May 2020 08:15) (66 - 80)  BP: 107/63 (18 May 2020 08:15) (107/63 - 144/76)  BP(mean): --  RR: 17 (18 May 2020 08:15) (16 - 17)  SpO2: 96% (18 May 2020 08:15) (96% - 99%)  Daily     Daily       RECENT LABS:                          10.9   10.27 )-----------( 520      ( 18 May 2020 06:00 )             32.9     05-18    134<L>  |  99  |  15  ----------------------------<  98  4.4   |  24  |  0.92    Ca    9.6      18 May 2020 06:00    TPro  7.4  /  Alb  3.7  /  TBili  0.5  /  DBili  x   /  AST  18  /  ALT  16  /  AlkPhos  55  05-18    LIVER FUNCTIONS - ( 18 May 2020 06:00 )  Alb: 3.7 g/dL / Pro: 7.4 g/dL / ALK PHOS: 55 U/L / ALT: 16 U/L / AST: 18 U/L / GGT: x                   CAPILLARY BLOOD GLUCOSE      POCT Blood Glucose.: 126 mg/dL (18 May 2020 07:12)  POCT Blood Glucose.: 146 mg/dL (17 May 2020 20:59)  POCT Blood Glucose.: 131 mg/dL (17 May 2020 16:50)

## 2020-05-18 NOTE — CONSULT NOTE ADULT - ASSESSMENT
70yo female RH dominant with PMH of HTN, HLD, T2DM, hx CVA, GERD, known RBBB presented to [facility] ED on 4/8/20 with complaints of fever, chills, cough, chest pain, SOB for 5 days. Per patient, her  tested +COVID at an outpatient testing facility a few days prior, patient herself had tested negative at the time. ED workup showed +COVID and Mobitz type II block. EP was consulted and patient is s/p Medtronic Micra AV+ leadless pacemaker implantation via right femoral vein on 4/10/20.   On 4/16/20, patient developed acute aphasia and right sided weakness. CT head showed an acute LEFT frontal/parietal CVA. CTA head/neck showed LEFT ICA stenosis but no occlusion. tPA was not administered due to timing and no penumbra. Placed on full dose lovenox as thought to be hypercoagulable secondary to COVID.  Transferred to [place] for acute inpatient rehab services 4/23/20.    Repeat CT head this morning showing new small areas of bleeding.  Hematology consulted  for coagulopathy. R/o bleeding disorder given persistent bleeding despite being off anticoagulation.       Coagulopathy: agreed with holding anticoagulation, stroke likely secondary to COVID 19+    Bleeding diathesis: Will check factor V,  VII, VIII, IX, XII, XIII. fibrinogen, coags, VWD.     Will follow. 68yo female RH dominant with PMH of HTN, HLD, T2DM, hx CVA, GERD, known RBBB presented to [facility] ED on 4/8/20 with complaints of fever, chills, cough, chest pain, SOB for 5 days. Per patient, her  tested +COVID at an outpatient testing facility a few days prior, patient herself had tested negative at the time. ED workup showed +COVID and Mobitz type II block. EP was consulted and patient is s/p Medtronic Micra AV+ leadless pacemaker implantation via right femoral vein on 4/10/20.   On 4/16/20, patient developed acute aphasia and right sided weakness. CT head showed an acute LEFT frontal/parietal CVA. CTA head/neck showed LEFT ICA stenosis but no occlusion. tPA was not administered due to timing and no penumbra. Placed on full dose lovenox as thought to be hypercoagulable secondary to COVID.  Transferred to [place] for acute inpatient rehab services 4/23/20.    Repeat CT head this morning showing new small areas of bleeding.  Hematology consulted  for coagulopathy. R/o bleeding disorder given persistent bleeding despite being off anticoagulation.       Coagulopathy: agreed with holding anticoagulation, stroke likely secondary to COVID 19+    Bleeding diathesis: Will check factor V,  VII, VIII, IX, XII, XIII. fibrinogen, coags, VWD.     Give Vitamin K 10 mg daily x 2 doses.     Will follow.

## 2020-05-18 NOTE — CONSULT NOTE ADULT - REASON FOR ADMISSION
Left frontal parietal CVA with right HP and aphasia, COVID+ 4/8/20

## 2020-05-18 NOTE — PROVIDER CONTACT NOTE (OTHER) - ASSESSMENT
Pt belching. pt denies any nausea, vomitting, jaw pain. /63 HR 66, RR17, saO2 96%
Pt has been on neuro checks q2h, which have remained the same no new worsening signs or symptoms of bleed. Pt had denied any headache or dizziness. Now complaining of headache.

## 2020-05-18 NOTE — PROGRESS NOTE ADULT - ASSESSMENT
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     Left frontal parietal CVA and SAH, repeat CT head 5-20 for interval evaluation   neuro and onc teams assist appreciated factor studies ordered, pending.   holding antiplatelet, lovenox dose     COVID-19 acute hypoxic respiratory failure resolved   - was on empiric therapeutic lovenox for hypercoagulable state due to covid  - saturating 99 % on RA now   - respiratory status stable      Hypertension  - BP controlled  - hydralazine, metoprolol, amlodipine     Mobitz type II AV block   - s/p PPM  follow up in clinic     RLE hematoma resorbing  - pain controlled and improving  - vascular sx: not likely compartment syndrome  - h/h stable  - off lovenox given above  - hh stable     Hyponatremia stable   monitor na/time     DM II  - FS controlled, inpatient target 150's   - metformin 1000mg q12, Lantus and ISS    Leukocytosis resolved   - mild without signs of infection  - continue to monitor    Normocytic Anemia stable   - likely due to blood loss given hematoma    Full Code   VTE proph   reviewed w patient and rehab team plan of care   fall risk precautions 69 year-old female with hx of essential HTN, HLD, DM II, CVA, GERD, RBBB with COVID-19, medically deconditioned and Left frontal and parietal CVA, SAH, RLE hematoma.     Left frontal parietal CVA and SAH, repeat CT head 5-20 for interval evaluation   neuro and onc teams assist appreciated factor studies ordered, pending.   holding antiplatelet, lovenox dose     chest pain ro myocardial ischemia ddx gerd, pe less likely, can not exclude entirely    check ekg-12 lead   cad risk rx     COVID-19 acute hypoxic respiratory failure resolved   - was on empiric therapeutic lovenox for hypercoagulable state due to covid  - saturating 99 % on RA now   - respiratory status stable      Hypertension  - BP controlled  - hydralazine, metoprolol, amlodipine     Mobitz type II AV block   - s/p PPM  follow up in clinic     RLE hematoma resorbing  - pain controlled and improving  - vascular sx: not likely compartment syndrome  - h/h stable  - off lovenox given above  - hh stable     Hyponatremia stable   monitor na/time     DM II  - FS controlled, inpatient target 150's   - metformin 1000mg q12, Lantus and ISS    Leukocytosis resolved   - mild without signs of infection  - continue to monitor    Normocytic Anemia stable   - likely due to blood loss given hematoma    Full Code   VTE proph   reviewed w patient and rehab team plan of care   fall risk precautions

## 2020-05-18 NOTE — PROGRESS NOTE ADULT - SUBJECTIVE AND OBJECTIVE BOX
seen with the rehab team at bedside     chest pain this am, mid sternal per rehab team   no palp  no sob, no signs of le edema     Noticeable   R facial droop     undergoing bleeding diathesis gotti as we speak, w hem onc team   Developed SAH   Next CT head 5-20    tells me that R calf pain resolving.     no chest pain no sob    ROS all others are neg     Vital Signs Last 24 Hrs    T(C): 36.7 (18 May 2020 08:15), Max: 36.9 (17 May 2020 21:11)  T(F): 98 (18 May 2020 08:15), Max: 98.5 (17 May 2020 21:11)  HR: 66 (18 May 2020 08:15) (66 - 80)  BP: 107/63 (18 May 2020 08:15) (107/63 - 144/76)  BP(mean): --  RR: 17 (18 May 2020 08:15) (16 - 17)  SpO2: 96% (18 May 2020 08:15) (96% - 99%)    CBC Full  -  ( 18 May 2020 06:00 )  WBC Count : 10.27 K/uL  RBC Count : 3.73 M/uL  Hemoglobin : 10.9 g/dL  Hematocrit : 32.9 %  Platelet Count - Automated : 520 K/uL  Mean Cell Volume : 88.2 fl  Mean Cell Hemoglobin : 29.2 pg  Mean Cell Hemoglobin Concentration : 33.1 gm/dL  Auto Neutrophil # : x  Auto Lymphocyte # : x  Auto Monocyte # : x  Auto Eosinophil # : x  Auto Basophil # : x  Auto Neutrophil % : x  Auto Lymphocyte % : x  Auto Monocyte % : x  Auto Eosinophil % : x  Auto Basophil % : x      05-18    134<L>  |  99  |  15  ----------------------------<  98  4.4   |  24  |  0.92    Ca    9.6      18 May 2020 06:00    TPro  7.4  /  Alb  3.7  /  TBili  0.5  /  DBili  x   /  AST  18  /  ALT  16  /  AlkPhos  55  05-18      MEDICATIONS  (STANDING):  amLODIPine   Tablet 5 milliGRAM(s) Oral daily  ammonium lactate 12% Lotion 1 Application(s) Topical two times a day  atorvastatin 10 milliGRAM(s) Oral at bedtime  BACItracin   Ointment 1 Application(s) Topical daily  dextrose 5%. 1000 milliLiter(s) (50 mL/Hr) IV Continuous <Continuous>  dextrose 50% Injectable 12.5 Gram(s) IV Push once  dextrose 50% Injectable 25 Gram(s) IV Push once  dextrose 50% Injectable 25 Gram(s) IV Push once  famotidine    Tablet 20 milliGRAM(s) Oral daily  FLUoxetine 20 milliGRAM(s) Oral daily  gabapentin 300 milliGRAM(s) Oral at bedtime  hydrALAZINE 50 milliGRAM(s) Oral daily  insulin glargine Injectable (LANTUS) 20 Unit(s) SubCutaneous at bedtime  insulin lispro (HumaLOG) corrective regimen sliding scale   SubCutaneous three times a day before meals  insulin lispro (HumaLOG) corrective regimen sliding scale   SubCutaneous at bedtime  lidocaine   Patch 1 Patch Transdermal daily  melatonin 3 milliGRAM(s) Oral at bedtime  metFORMIN 1000 milliGRAM(s) Oral two times a day with meals  metoprolol tartrate 25 milliGRAM(s) Oral every 12 hours  polyethylene glycol 3350 17 Gram(s) Oral daily  sodium chloride 0.65% Nasal 1 Spray(s) Both Nostrils four times a day  sodium chloride 0.9% Bolus 1000 milliLiter(s) IV Bolus once

## 2020-05-19 LAB
FACT II INHIB PPP-ACNC: 102 % — SIGNIFICANT CHANGE UP (ref 80–135)
FACT IX PPP CHRO-ACNC: 110 % — SIGNIFICANT CHANGE UP (ref 80–165)
FACT V ACT/NOR PPP: 103 % — SIGNIFICANT CHANGE UP (ref 50–150)
FACT VII ACT/NOR PPP: 72 % — SIGNIFICANT CHANGE UP (ref 50–165)
FACT VIII ACT/NOR PPP: 152 % — HIGH (ref 60–125)
FACT X ACT/NOR PPP: 84 % — SIGNIFICANT CHANGE UP (ref 70–170)
FACT XII ACT/NOR PPP: 86 % — SIGNIFICANT CHANGE UP (ref 70–145)
FACT XIIA PPP-ACNC: 77 % — SIGNIFICANT CHANGE UP (ref 45–150)
VWF:RCO ACT/NOR PPP PL AGG: 148 % — HIGH (ref 45–133)

## 2020-05-19 PROCEDURE — 99231 SBSQ HOSP IP/OBS SF/LOW 25: CPT

## 2020-05-19 PROCEDURE — 99233 SBSQ HOSP IP/OBS HIGH 50: CPT

## 2020-05-19 RX ADMIN — Medication 1 APPLICATION(S): at 18:07

## 2020-05-19 RX ADMIN — ATORVASTATIN CALCIUM 10 MILLIGRAM(S): 80 TABLET, FILM COATED ORAL at 22:32

## 2020-05-19 RX ADMIN — Medication 100 MILLIGRAM(S): at 12:09

## 2020-05-19 RX ADMIN — Medication 1 SPRAY(S): at 01:09

## 2020-05-19 RX ADMIN — LIDOCAINE 1 PATCH: 4 CREAM TOPICAL at 00:32

## 2020-05-19 RX ADMIN — Medication 1 APPLICATION(S): at 06:55

## 2020-05-19 RX ADMIN — Medication 50 MILLIGRAM(S): at 06:55

## 2020-05-19 RX ADMIN — Medication 3 MILLIGRAM(S): at 00:32

## 2020-05-19 RX ADMIN — GABAPENTIN 300 MILLIGRAM(S): 400 CAPSULE ORAL at 22:32

## 2020-05-19 RX ADMIN — Medication 100 MILLIGRAM(S): at 22:39

## 2020-05-19 RX ADMIN — INSULIN GLARGINE 20 UNIT(S): 100 INJECTION, SOLUTION SUBCUTANEOUS at 22:33

## 2020-05-19 RX ADMIN — Medication 3 MILLIGRAM(S): at 22:34

## 2020-05-19 RX ADMIN — Medication 20 MILLIGRAM(S): at 12:36

## 2020-05-19 RX ADMIN — AMLODIPINE BESYLATE 5 MILLIGRAM(S): 2.5 TABLET ORAL at 06:55

## 2020-05-19 RX ADMIN — Medication 650 MILLIGRAM(S): at 18:00

## 2020-05-19 RX ADMIN — METFORMIN HYDROCHLORIDE 1000 MILLIGRAM(S): 850 TABLET ORAL at 08:19

## 2020-05-19 RX ADMIN — FAMOTIDINE 20 MILLIGRAM(S): 10 INJECTION INTRAVENOUS at 12:36

## 2020-05-19 RX ADMIN — LIDOCAINE 1 PATCH: 4 CREAM TOPICAL at 12:36

## 2020-05-19 RX ADMIN — Medication 1 SPRAY(S): at 18:07

## 2020-05-19 RX ADMIN — LIDOCAINE 1 PATCH: 4 CREAM TOPICAL at 19:00

## 2020-05-19 RX ADMIN — Medication 25 MILLIGRAM(S): at 06:55

## 2020-05-19 RX ADMIN — Medication 25 MILLIGRAM(S): at 18:07

## 2020-05-19 RX ADMIN — Medication 1 SPRAY(S): at 06:55

## 2020-05-19 RX ADMIN — METFORMIN HYDROCHLORIDE 1000 MILLIGRAM(S): 850 TABLET ORAL at 18:07

## 2020-05-19 NOTE — PROGRESS NOTE ADULT - SUBJECTIVE AND OBJECTIVE BOX
Points to R calf    Mild pain   Improving     No chest pain   No new weakness    ROS all others are neg     Vital Signs Last 24 Hrs    T(C): 36.9 (19 May 2020 08:18), Max: 36.9 (18 May 2020 20:41)  T(F): 98.5 (19 May 2020 08:18), Max: 98.5 (19 May 2020 08:18)  HR: 70 (19 May 2020 08:18) (70 - 83)  BP: 115/70 (19 May 2020 08:18) (115/70 - 126/72)  BP(mean): --  RR: 16 (19 May 2020 08:18) (16 - 16)  SpO2: 98% (19 May 2020 08:18) (97% - 98%)      CBC Full  -  ( 18 May 2020 06:00 )  WBC Count : 10.27 K/uL  RBC Count : 3.73 M/uL  Hemoglobin : 10.9 g/dL  Hematocrit : 32.9 %  Platelet Count - Automated : 520 K/uL  Mean Cell Volume : 88.2 fl  Mean Cell Hemoglobin : 29.2 pg  Mean Cell Hemoglobin Concentration : 33.1 gm/dL  Auto Neutrophil # : x  Auto Lymphocyte # : x  Auto Monocyte # : x  Auto Eosinophil # : x  Auto Basophil # : x  Auto Neutrophil % : x  Auto Lymphocyte % : x  Auto Monocyte % : x  Auto Eosinophil % : x  Auto Basophil % : x    05-18    134<L>  |  99  |  15  ----------------------------<  98  4.4   |  24  |  0.92    Ca    9.6      18 May 2020 06:00    TPro  7.4  /  Alb  3.7  /  TBili  0.5  /  DBili  x   /  AST  18  /  ALT  16  /  AlkPhos  55  05-18    MEDICATIONS  (STANDING):    amLODIPine   Tablet 5 milliGRAM(s) Oral daily  ammonium lactate 12% Lotion 1 Application(s) Topical two times a day  atorvastatin 10 milliGRAM(s) Oral at bedtime  BACItracin   Ointment 1 Application(s) Topical daily  dextrose 5%. 1000 milliLiter(s) (50 mL/Hr) IV Continuous <Continuous>  dextrose 50% Injectable 12.5 Gram(s) IV Push once  dextrose 50% Injectable 25 Gram(s) IV Push once  dextrose 50% Injectable 25 Gram(s) IV Push once  famotidine    Tablet 20 milliGRAM(s) Oral daily  FLUoxetine 20 milliGRAM(s) Oral daily  gabapentin 300 milliGRAM(s) Oral at bedtime  hydrALAZINE 50 milliGRAM(s) Oral daily  insulin glargine Injectable (LANTUS) 20 Unit(s) SubCutaneous at bedtime  insulin lispro (HumaLOG) corrective regimen sliding scale   SubCutaneous three times a day before meals  insulin lispro (HumaLOG) corrective regimen sliding scale   SubCutaneous at bedtime  lidocaine   Patch 1 Patch Transdermal daily  melatonin 3 milliGRAM(s) Oral at bedtime  metFORMIN 1000 milliGRAM(s) Oral two times a day with meals  metoprolol tartrate 25 milliGRAM(s) Oral every 12 hours  polyethylene glycol 3350 17 Gram(s) Oral daily  sodium chloride 0.65% Nasal 1 Spray(s) Both Nostrils four times a day  sodium chloride 0.9% Bolus 1000 milliLiter(s) IV Bolus once    CBC Full  -  ( 18 May 2020 06:00 )  WBC Count : 10.27 K/uL  RBC Count : 3.73 M/uL  Hemoglobin : 10.9 g/dL  Hematocrit : 32.9 %  Platelet Count - Automated : 520 K/uL  Mean Cell Volume : 88.2 fl  Mean Cell Hemoglobin : 29.2 pg  Mean Cell Hemoglobin Concentration : 33.1 gm/dL  Auto Neutrophil # : x  Auto Lymphocyte # : x  Auto Monocyte # : x  Auto Eosinophil # : x  Auto Basophil # : x  Auto Neutrophil % : x  Auto Lymphocyte % : x  Auto Monocyte % : x  Auto Eosinophil % : x  Auto Basophil % : x

## 2020-05-19 NOTE — PROGRESS NOTE ADULT - SUBJECTIVE AND OBJECTIVE BOX
Patient is a 69y old  Female who presents with a chief complaint of left frontal parietal CVA with right HP and aphasia, COVID+ 4/8/20 (18 May 2020 12:52)      HPI:  SHIREEN CASH is a 68yo female RH dominant with PMH of HTN, HLD, T2DM, hx CVA, GERD, known RBBB presented to [facility] ED on 4/8/20 with complaints of fever, chills, cough, chest pain, SOB for 5 days. Per patient, her  tested +COVID at an outpatient testing facility a few days prior, patient herself had tested negative at the time. They continued to share the same living space. ED workup showed +COVID and Mobitz type II block. EP was consulted and patient is s/p Medtronic Micra AV+ leadless pacemaker implantation via right femoral vein on 4/10/20. Hydroxychloroquine presumably not given due to prolonged QTc.     On 4/16/20, patient developed acute aphasia and right sided weakness. CT head showed an acute LEFT frontal/parietal CVA. CTA head/neck showed LEFT ICA stenosis but no occlusion. tPA was not administered due to timing and no penumbra. Placed on full dose lovenox as thoguht to be hypercoagulable secondary to COVID. Patient was cleared for regular consistency solids, thin liquids. Transferred to [place] for acute inpatient rehab services 4/23/20. (23 Apr 2020 14:26)      PAST MEDICAL & SURGICAL HISTORY:  H/O gastroesophageal reflux (GERD)  HTN (hypertension)  DM (diabetes mellitus)  History of benign brain tumor      MEDICATIONS  (STANDING):  amLODIPine   Tablet 5 milliGRAM(s) Oral daily  ammonium lactate 12% Lotion 1 Application(s) Topical two times a day  atorvastatin 10 milliGRAM(s) Oral at bedtime  BACItracin   Ointment 1 Application(s) Topical daily  dextrose 5%. 1000 milliLiter(s) (50 mL/Hr) IV Continuous <Continuous>  dextrose 50% Injectable 12.5 Gram(s) IV Push once  dextrose 50% Injectable 25 Gram(s) IV Push once  dextrose 50% Injectable 25 Gram(s) IV Push once  famotidine    Tablet 20 milliGRAM(s) Oral daily  FLUoxetine 20 milliGRAM(s) Oral daily  gabapentin 300 milliGRAM(s) Oral at bedtime  hydrALAZINE 50 milliGRAM(s) Oral daily  insulin glargine Injectable (LANTUS) 20 Unit(s) SubCutaneous at bedtime  insulin lispro (HumaLOG) corrective regimen sliding scale   SubCutaneous three times a day before meals  insulin lispro (HumaLOG) corrective regimen sliding scale   SubCutaneous at bedtime  lidocaine   Patch 1 Patch Transdermal daily  melatonin 3 milliGRAM(s) Oral at bedtime  metFORMIN 1000 milliGRAM(s) Oral two times a day with meals  metoprolol tartrate 25 milliGRAM(s) Oral every 12 hours  polyethylene glycol 3350 17 Gram(s) Oral daily  sodium chloride 0.65% Nasal 1 Spray(s) Both Nostrils four times a day  sodium chloride 0.9% Bolus 1000 milliLiter(s) IV Bolus once    MEDICATIONS  (PRN):  acetaminophen   Tablet .. 650 milliGRAM(s) Oral every 6 hours PRN Temp greater or equal to 38C (100.4F), Mild Pain (1 - 3)  benzonatate 100 milliGRAM(s) Oral every 8 hours PRN Cough  dextrose 40% Gel 15 Gram(s) Oral once PRN Blood Glucose LESS THAN 70 milliGRAM(s)/deciliter  glucagon  Injectable 1 milliGRAM(s) IntraMuscular once PRN Glucose LESS THAN 70 milligrams/deciliter  lactulose Syrup 10 Gram(s) Oral daily PRN constipation  saline laxative (FLEET) Rectal Enema 1 Enema Rectal once PRN if no BM by this evening 5/13/20      Allergies    No Known Allergies    Intolerances            PHYSICAL EXAM  69y  Vital Signs Last 24 Hrs  T(C): 36.9 (19 May 2020 08:18), Max: 36.9 (18 May 2020 20:41)  T(F): 98.5 (19 May 2020 08:18), Max: 98.5 (19 May 2020 08:18)  HR: 70 (19 May 2020 08:18) (70 - 83)  BP: 115/70 (19 May 2020 08:18) (115/70 - 126/72)  BP(mean): --  RR: 16 (19 May 2020 08:18) (16 - 16)  SpO2: 98% (19 May 2020 08:18) (97% - 98%)  Daily     Daily       RECENT LABS:                          10.9   10.27 )-----------( 520      ( 18 May 2020 06:00 )             32.9     05-18    134<L>  |  99  |  15  ----------------------------<  98  4.4   |  24  |  0.92    Ca    9.6      18 May 2020 06:00    TPro  7.4  /  Alb  3.7  /  TBili  0.5  /  DBili  x   /  AST  18  /  ALT  16  /  AlkPhos  55  05-18    LIVER FUNCTIONS - ( 18 May 2020 06:00 )  Alb: 3.7 g/dL / Pro: 7.4 g/dL / ALK PHOS: 55 U/L / ALT: 16 U/L / AST: 18 U/L / GGT: x           PT/INR - ( 18 May 2020 13:35 )   PT: 12.6 sec;   INR: 1.11 ratio         PTT - ( 18 May 2020 13:35 )  PTT:24.9 sec        CAPILLARY BLOOD GLUCOSE      POCT Blood Glucose.: 126 mg/dL (19 May 2020 08:12)  POCT Blood Glucose.: 125 mg/dL (18 May 2020 21:22)  POCT Blood Glucose.: 109 mg/dL (18 May 2020 16:47)  POCT Blood Glucose.: 153 mg/dL (18 May 2020 12:01) Patient is a 69y old  Female who presents with a chief complaint of left frontal parietal CVA with right HP and aphasia, COVID+ 4/8/20 (18 May 2020 12:52)      HPI:  SHIREEN CASH is a 70yo female RH dominant with PMH of HTN, HLD, T2DM, hx CVA, GERD, known RBBB presented to [facility] ED on 4/8/20 with complaints of fever, chills, cough, chest pain, SOB for 5 days. Per patient, her  tested +COVID at an outpatient testing facility a few days prior, patient herself had tested negative at the time. They continued to share the same living space. ED workup showed +COVID and Mobitz type II block. EP was consulted and patient is s/p Medtronic Micra AV+ leadless pacemaker implantation via right femoral vein on 4/10/20. Hydroxychloroquine presumably not given due to prolonged QTc.     On 4/16/20, patient developed acute aphasia and right sided weakness. CT head showed an acute LEFT frontal/parietal CVA. CTA head/neck showed LEFT ICA stenosis but no occlusion. tPA was not administered due to timing and no penumbra. Placed on full dose lovenox as thoguht to be hypercoagulable secondary to COVID. Patient was cleared for regular consistency solids, thin liquids. Transferred to [place] for acute inpatient rehab services 4/23/20. (23 Apr 2020 14:26)      PAST MEDICAL & SURGICAL HISTORY:  H/O gastroesophageal reflux (GERD)  HTN (hypertension)  DM (diabetes mellitus)  History of benign brain tumor      MEDICATIONS  (STANDING):  amLODIPine   Tablet 5 milliGRAM(s) Oral daily  ammonium lactate 12% Lotion 1 Application(s) Topical two times a day  atorvastatin 10 milliGRAM(s) Oral at bedtime  BACItracin   Ointment 1 Application(s) Topical daily  dextrose 5%. 1000 milliLiter(s) (50 mL/Hr) IV Continuous <Continuous>  dextrose 50% Injectable 12.5 Gram(s) IV Push once  dextrose 50% Injectable 25 Gram(s) IV Push once  dextrose 50% Injectable 25 Gram(s) IV Push once  famotidine    Tablet 20 milliGRAM(s) Oral daily  FLUoxetine 20 milliGRAM(s) Oral daily  gabapentin 300 milliGRAM(s) Oral at bedtime  hydrALAZINE 50 milliGRAM(s) Oral daily  insulin glargine Injectable (LANTUS) 20 Unit(s) SubCutaneous at bedtime  insulin lispro (HumaLOG) corrective regimen sliding scale   SubCutaneous three times a day before meals  insulin lispro (HumaLOG) corrective regimen sliding scale   SubCutaneous at bedtime  lidocaine   Patch 1 Patch Transdermal daily  melatonin 3 milliGRAM(s) Oral at bedtime  metFORMIN 1000 milliGRAM(s) Oral two times a day with meals  metoprolol tartrate 25 milliGRAM(s) Oral every 12 hours  polyethylene glycol 3350 17 Gram(s) Oral daily  sodium chloride 0.65% Nasal 1 Spray(s) Both Nostrils four times a day  sodium chloride 0.9% Bolus 1000 milliLiter(s) IV Bolus once    MEDICATIONS  (PRN):  acetaminophen   Tablet .. 650 milliGRAM(s) Oral every 6 hours PRN Temp greater or equal to 38C (100.4F), Mild Pain (1 - 3)  benzonatate 100 milliGRAM(s) Oral every 8 hours PRN Cough  dextrose 40% Gel 15 Gram(s) Oral once PRN Blood Glucose LESS THAN 70 milliGRAM(s)/deciliter  glucagon  Injectable 1 milliGRAM(s) IntraMuscular once PRN Glucose LESS THAN 70 milligrams/deciliter  lactulose Syrup 10 Gram(s) Oral daily PRN constipation  saline laxative (FLEET) Rectal Enema 1 Enema Rectal once PRN if no BM by this evening 5/13/20      Allergies    No Known Allergies    Intolerances            PHYSICAL EXAM  69y  Vital Signs Last 24 Hrs  T(C): 36.9 (19 May 2020 08:18), Max: 36.9 (18 May 2020 20:41)  T(F): 98.5 (19 May 2020 08:18), Max: 98.5 (19 May 2020 08:18)  HR: 70 (19 May 2020 08:18) (70 - 83)  BP: 115/70 (19 May 2020 08:18) (115/70 - 126/72)  BP(mean): --  RR: 16 (19 May 2020 08:18) (16 - 16)  SpO2: 98% (19 May 2020 08:18) (97% - 98%)  Daily     Daily       RECENT LABS:                          10.9   10.27 )-----------( 520      ( 18 May 2020 06:00 )             32.9     05-18    134<L>  |  99  |  15  ----------------------------<  98  4.4   |  24  |  0.92    Ca    9.6      18 May 2020 06:00    TPro  7.4  /  Alb  3.7  /  TBili  0.5  /  DBili  x   /  AST  18  /  ALT  16  /  AlkPhos  55  05-18    LIVER FUNCTIONS - ( 18 May 2020 06:00 )  Alb: 3.7 g/dL / Pro: 7.4 g/dL / ALK PHOS: 55 U/L / ALT: 16 U/L / AST: 18 U/L / GGT: x           PT/INR - ( 18 May 2020 13:35 )   PT: 12.6 sec;   INR: 1.11 ratio         PTT - ( 18 May 2020 13:35 )  PTT:24.9 sec        CAPILLARY BLOOD GLUCOSE      POCT Blood Glucose.: 126 mg/dL (19 May 2020 08:12)  POCT Blood Glucose.: 125 mg/dL (18 May 2020 21:22)  POCT Blood Glucose.: 109 mg/dL (18 May 2020 16:47)  POCT Blood Glucose.: 153 mg/dL (18 May 2020 12:01)

## 2020-05-19 NOTE — PROGRESS NOTE ADULT - ASSESSMENT
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     Left frontal parietal CVA and SAH, repeat CT head 5-20 for interval evaluation (no interval change)   neuro and onc teams assist appreciated factor studies ordered, pending.   holding antiplatelet, lovenox dose     chest pain resolved, possibly non cardiac, monitor given cad risk   cad risk rx  needs op cards fu     COVID-19 acute hypoxic respiratory failure resolved   - was on empiric therapeutic lovenox for hypercoagulable state due to covid  - saturating 99 % on RA now   - respiratory status stable      Hypertension  - BP controlled  - hydralazine, metoprolol, amlodipine     Mobitz type II AV block   - s/p PPM  follow up in clinic     RLE hematoma resorbing, residual pain   - pain controlled and improving  - vascular sx: not likely compartment syndrome  - h/h stable  - off lovenox given above  - hh stable     Hyponatremia resolved   monitor na/time     DM II  - FS controlled, inpatient target 150's   - metformin 1000mg q12, Lantus and ISS    Normocytic Anemia stable   - likely due to blood loss given hematoma    Full Code   VTE proph   reviewed w patient and rehab team plan of care   fall risk precautions

## 2020-05-19 NOTE — PROGRESS NOTE ADULT - ASSESSMENT
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 - Holding due to finding of SAH. HEAD CT  for determination start AC, patient aware  - cont prozac for motor recovery. Increase to 20 mg daily for mood d/o , tolerating, improved  -neuropsychology supportive counseling  -right SAFO as outpatient. Continue ACe wrapping for foot drop for now  -Head CT : new Mild subarachnoid hemorrhage in the left frontal lobe stable on repeat imaging 5/10  -repeat CT head  - STABLE NO NEW BLEED  -Head CT, 5/15 - Left SAH and new righ tlateral perimesencephalic SAH. stable 2.5 cm L arachnoid cyst.  -neuro consult appreciated 5/15. Hold off on MRI,  - Repeat Head CT  pending  -Heme/onc consulted for ?hypo-coagulopathy:. 518 consult read and appreciated:  ·	Coagulopathy: agreed with holding anticoagulation, stroke likely secondary to COVID 19+  ·	Bleeding diathesis: Will check factor V,  VII, VIII, IX, XII, XIII. fibrinogen, coags, VWD.   ·	Give Vitamin K 10 mg daily x 2 doses.      -PRECAUTIONS: airborne, contact, aspiration, cardiac, fall    #Chest pain  - resolved  -EKG NSR, troponin negative, DDimer downtrending  -appears musculoskeletal & 2/2 to positioning  -encourage OOB, tylenol prn pain    #RLE pain improving overall  - Doppler negative DVT ,   -  CT RLE with finding of hematoma, vascular consulted would monitor for now, can continue dvt ppx given risk of COVID - appears improved on exam   - LE ANGEL performed - with +moderate PAD  -Xray right knee  negative for acute fracture  Xray right ankle  negative for fracture +calcaneal spur    #Acute Hypoxic Respiratory Failure secondary to COVID-19 PNA  Date of COVID testin20  - Hydroxychloroquine not given, pt with prolonged QTc  - EKG - QTc: 500 (), incomplete RBBB  - Incentive spirometry, robitussin DM, ocean spray  -patient tolerating therapies without O2  -COVID reswab NEGATIVE     #UTI  -completed macrobid 5/15  -urine cultures - +E coli >100k macrobid sensitive  -mild leukocytosis 11.72 . Afebrile, symptoms improved  CBC  - 10.27 stable     #BRADY 2/2 to dehydration vs UTI - resolved  - encourage po fluids - monitor renal indices  stable    #HTN  - Lopressor 25 q12, amlodipine 5 qd, hydralazine 50mg qd  controlled    #type II Mobitz block, known RBBB  - s/p Medtronic Micra AV+ leadless pacemaker  - outpt EP followup    #T2DM.  - SSI ACHS  -diabetic educator consult appreciated  continue metformin 100 mg BID  -lantus 20 units qhs  controlled     #HLD  - cont simvastatin 20 qhs    #insomnia  - melatonin 3    #Pain Mgmt   - Tylenol PRN    #GI/Bowel Mgmt   - Continent  - pepcid    #/Bladder Mgmt   - Continent, PVR negative    #FEN   - Diet - Regular + Thins  [CCHO]  DASH/TLC      #skin:  abrasion back. bacitracin and DSD daily     #- DVT PPX  - on hold for bleed. Repeat Head CT     #Case discussed in IDT rounds :  max assist toilet transfers, stnad-pivot mod assist limited by pain, Mod assist transfers. Deficits i nproblem-solving, comprehension, memory, Has some improvement in insight which may also impact mood  -goals: mod assist bADLs, transfers, ambulation , requires 24 hours supervision  -patient dose not appear to have that level of support at home at this time. 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 - Holding due to finding of SAH. HEAD CT tomorrow  for determination start AC  - cont prozac for motor recovery. Increase to 20 mg daily for mood d/o , tolerating, improved  -neuropsychology supportive counseling  -right SAFO as outpatient. Continue ACe wrapping for foot drop for now  -Head CT : new Mild subarachnoid hemorrhage in the left frontal lobe stable on repeat imaging 5/10  -repeat CT head  - STABLE NO NEW BLEED  -Head CT, 5/15 - Left SAH and new righ tlateral perimesencephalic SAH. stable 2.5 cm L arachnoid cyst.  -neuro consult appreciated 5/15. Hold off on MRI,    # r/o coagulopathy due to spontaneous SAH and RLE hematoma:  -Heme/onc consulted for ?hypo-coagulopathy:.  consult read and appreciated:  ·	Coagulopathy: agreed with holding anticoagulation, stroke likely secondary to COVID 19+  ·	Bleeding diathesis: Will check factor V,  VII, VIII, IX, XII, XIII. fibrinogen, coags, VWD.   ·	Give Vitamin K 10 mg daily x 2 doses.      -PRECAUTIONS: airborne, contact, aspiration, cardiac, fall    #Chest pain  - resolved  -EKG NSR, troponin negative, DDimer downtrending  -appears musculoskeletal & 2/2 to positioning  -encourage OOB, tylenol prn pain    #RLE pain improving overall  - Doppler negative DVT ,   -  CT RLE with finding of hematoma, vascular consulted would monitor for now, can continue dvt ppx given risk of COVID - appears improved on exam   - LE ANGEL performed - with +moderate PAD  -Xray right knee  negative for acute fracture  Xray right ankle  negative for fracture +calcaneal spur    #Acute Hypoxic Respiratory Failure secondary to COVID-19 PNA  Date of COVID testin20  - Hydroxychloroquine not given, pt with prolonged QTc  - EKG - QTc: 500 (), incomplete RBBB  - Incentive spirometry, robitussin DM, ocean spray  -patient tolerating therapies without O2  -COVID reswab NEGATIVE     #UTI  -completed macrobid 5/15  -urine cultures - +E coli >100k macrobid sensitive  -mild leukocytosis 11.72 . Afebrile, symptoms improved  CBC  - 10.27 stable     #BRADY 2/2 to dehydration vs UTI - resolved  - encourage po fluids - monitor renal indices  stable    #HTN  - Lopressor 25 q12, amlodipine 5 qd, hydralazine 50mg qd  controlled    #type II Mobitz block, known RBBB  - s/p Medtronic Micra AV+ leadless pacemaker  - outpt EP followup    #T2DM.  - SSI ACHS  -diabetic educator consult appreciated  continue metformin 100 mg BID  -lantus 20 units qhs  controlled     #HLD  - cont simvastatin 20 qhs    #insomnia  - melatonin 3    #Pain Mgmt   - Tylenol PRN    #GI/Bowel Mgmt   - Continent  - pepcid    #/Bladder Mgmt   - Continent, PVR negative    #FEN   - Diet - Regular + Thins  [CCHO]  DASH/TLC      #skin:  abrasion back. bacitracin and DSD daily     #- DVT PPX  - on hold for bleed. Repeat Head CT     #Case discussed in IDT rounds :  max assist toilet transfers, stnad-pivot mod assist limited by pain, Mod assist transfers. Deficits i nproblem-solving, comprehension, memory, Has some improvement in insight which may also impact mood  -goals: mod assist bADLs, transfers, ambulation , requires 24 hours supervision  -patient dose not appear to have that level of support at home at this time. Will look for [facility]AL[facility] to continue Ot, PT, SLP to improve funciton once medically stable      #LABS  CBC BMp   Repeat Head CT

## 2020-05-19 NOTE — PROGRESS NOTE ADULT - COMMENTS
No overnight events. patient states that CP from yesterday resolved. patient states that she had some difficulty sleeping 2/2 to pain in the RLE which is overall improving. Otherwise no complaints. Denies urinary or bowel symptoms. No overnight events. patient states that CP from yesterday resolved. patient states that she had some difficulty sleeping 2/2 to pain in the RLE which is overall improving. Otherwise no complaints. Denies urinary or bowel symptoms.    Mood overall looks stable, mildly improved. Increased participation in program

## 2020-05-20 PROCEDURE — 99233 SBSQ HOSP IP/OBS HIGH 50: CPT

## 2020-05-20 PROCEDURE — 70450 CT HEAD/BRAIN W/O DYE: CPT | Mod: 26

## 2020-05-20 PROCEDURE — 99232 SBSQ HOSP IP/OBS MODERATE 35: CPT

## 2020-05-20 RX ORDER — ASPIRIN/CALCIUM CARB/MAGNESIUM 324 MG
81 TABLET ORAL DAILY
Refills: 0 | Status: DISCONTINUED | OUTPATIENT
Start: 2020-05-20 | End: 2020-05-29

## 2020-05-20 RX ORDER — INSULIN LISPRO 100/ML
VIAL (ML) SUBCUTANEOUS
Refills: 0 | Status: DISCONTINUED | OUTPATIENT
Start: 2020-05-20 | End: 2020-05-29

## 2020-05-20 RX ORDER — ENOXAPARIN SODIUM 100 MG/ML
40 INJECTION SUBCUTANEOUS DAILY
Refills: 0 | Status: DISCONTINUED | OUTPATIENT
Start: 2020-05-21 | End: 2020-05-29

## 2020-05-20 RX ADMIN — ATORVASTATIN CALCIUM 10 MILLIGRAM(S): 80 TABLET, FILM COATED ORAL at 23:54

## 2020-05-20 RX ADMIN — GABAPENTIN 300 MILLIGRAM(S): 400 CAPSULE ORAL at 23:54

## 2020-05-20 RX ADMIN — Medication 25 MILLIGRAM(S): at 17:37

## 2020-05-20 RX ADMIN — METFORMIN HYDROCHLORIDE 1000 MILLIGRAM(S): 850 TABLET ORAL at 09:02

## 2020-05-20 RX ADMIN — Medication 25 MILLIGRAM(S): at 06:36

## 2020-05-20 RX ADMIN — Medication 20 MILLIGRAM(S): at 13:16

## 2020-05-20 RX ADMIN — Medication 1 SPRAY(S): at 00:26

## 2020-05-20 RX ADMIN — Medication 1 SPRAY(S): at 23:56

## 2020-05-20 RX ADMIN — Medication 1 SPRAY(S): at 06:37

## 2020-05-20 RX ADMIN — FAMOTIDINE 20 MILLIGRAM(S): 10 INJECTION INTRAVENOUS at 13:16

## 2020-05-20 RX ADMIN — LIDOCAINE 1 PATCH: 4 CREAM TOPICAL at 00:26

## 2020-05-20 RX ADMIN — METFORMIN HYDROCHLORIDE 1000 MILLIGRAM(S): 850 TABLET ORAL at 17:37

## 2020-05-20 RX ADMIN — AMLODIPINE BESYLATE 5 MILLIGRAM(S): 2.5 TABLET ORAL at 06:35

## 2020-05-20 RX ADMIN — Medication 50 MILLIGRAM(S): at 06:36

## 2020-05-20 RX ADMIN — Medication 3 MILLIGRAM(S): at 23:56

## 2020-05-20 RX ADMIN — Medication 1 APPLICATION(S): at 06:36

## 2020-05-20 RX ADMIN — INSULIN GLARGINE 20 UNIT(S): 100 INJECTION, SOLUTION SUBCUTANEOUS at 23:54

## 2020-05-20 RX ADMIN — Medication 1 SPRAY(S): at 13:17

## 2020-05-20 RX ADMIN — LIDOCAINE 1 PATCH: 4 CREAM TOPICAL at 23:38

## 2020-05-20 RX ADMIN — POLYETHYLENE GLYCOL 3350 17 GRAM(S): 17 POWDER, FOR SOLUTION ORAL at 13:17

## 2020-05-20 NOTE — PROGRESS NOTE ADULT - COMMENTS
No overnight events. [place]  Language service Amharic patient. Patient states that she feels "magnificent" today. was able to sleep better and leg pain improving. Last bowel movement in the AM. Denies further chest pain. patient notified of ct head results.  Mood overall looks improved. No overnight events. [place]  Language service Greenlandic patient. Patient states that she feels "magnificent" today. was able to sleep better and leg pain improving. Last bowel movement in the AM. Denies further chest pain. patient notified of ct head results.  Mood overall looks improved.    Staff also reporting improvement in ADLs and transfers/ambulation in therapies, improved expressive output

## 2020-05-20 NOTE — PROGRESS NOTE ADULT - ASSESSMENT
Patient is a 69 year old woman admitted 4/23/20 to [facility].  She had been hospitalized on 4/8/20 with Covid 19 (+). She was found to have a Mobitz type II block and a pacemaker was placed. On 4/16 she was found aphasic with right-sided hemiparesis. A CT head noted a left frontoparietal infarct. A CTA Head and Neck noted Left ICA stenosis without occlusion. She was placed on ASA.  She was also  placed on full dose of lovenox with consideration of hypercoagulable state and COVID -19 (+). Today noted extensive ecchymosis involving the right posterior calf and patient complains of pain involving the same area. She notes mild HA generalized . She denies any change with difficulty with speech. She states no change with right arm weakness and inability to move right leg. She denies other neurological complaints. She denies other complaints. Neurological exam as above. Today, Saturday,5/9, notes less calf pain. She continues with mild HA and no change with neurological complaints. Today, Saturday, 5/16, she denies any HA. She notes no change with neurological complaints.  Today, Wednesday, 5/20, she denies HA. She states slight increased strength RUE.    1) CT head 5/9 as  new mild SAH left frontal. Evolving subacute left MCA infarcts, chronic right inf cerebellar hemispheric infarct, small arachnoid cyst adjacent to left sylvian fissure.  2) CT Head 5/10  mild SAH left frontal unchanged from CT Head 5/9.  No new hemorrhage. Stable chronic Left MCA infarcts. Stable chronic right inf cerebellar infarct. Stable chronic small subarachnoid cyst.  3) Agree with ASA 81 mg daily discontinued and Lovenox discontinued.  4) CT head 5/12  again noted low level blood left anterior paracentral region, no evidence of new hemorrhage. Stable follow-up with no significant change. Ischemic changes with now mature appearance of Left MCA and OLU infarcts seen 4/16/20. follow-up MRI imaging recommended for further assessment.  5)  CT head 5/15,  chronic infarction left frontal and parietal lobes with unchanged focus of subarachnoid hemorrhage in the left frontal infarction. Minimal subarachnoid hemorrhage within the right lateral perimesencephalic cistern. Old infarct again noted right cerebellum. Stable arachnoid cyst left middle cranial fossa. Discussed with the radiologist her reading of the minimal SAH on the right on this study. She advises that not seen on the prior CT Head 5/12/20.  6)  CT Head 5/20/20, as above small left frontal SAH is decreased. Encephalomalacia and gliosis in the left frontal and parietal areas and right cerebellum suggesting chronic infarcts. Arachnoid cyst suggested left frontotemporal. Chronic white matter ischemic change.  7) May begin ASA 81 mg daily.   8) Would obtain follow-up CT head in 1 week.

## 2020-05-20 NOTE — PROVIDER CONTACT NOTE (OTHER) - BACKGROUND
Reports finger tips are aching from blood glucose checks. Patient blood glucose levels more controlled, have not needed insulin coverage AC meals.

## 2020-05-20 NOTE — PROGRESS NOTE ADULT - SUBJECTIVE AND OBJECTIVE BOX
Patient is a 69y old  Female who presents with a chief complaint of left frontal parietal CVA with right HP and aphasia, COVID+ 4/8/20 fu (20 May 2020 12:04)      HPI:  SHIREEN CASH is a 70yo female RH dominant with PMH of HTN, HLD, T2DM, hx CVA, GERD, known RBBB presented to [facility] ED on 4/8/20 with complaints of fever, chills, cough, chest pain, SOB for 5 days. Per patient, her  tested +COVID at an outpatient testing facility a few days prior, patient herself had tested negative at the time. They continued to share the same living space. ED workup showed +COVID and Mobitz type II block. EP was consulted and patient is s/p Medtronic Micra AV+ leadless pacemaker implantation via right femoral vein on 4/10/20. Hydroxychloroquine presumably not given due to prolonged QTc.     On 4/16/20, patient developed acute aphasia and right sided weakness. CT head showed an acute LEFT frontal/parietal CVA. CTA head/neck showed LEFT ICA stenosis but no occlusion. tPA was not administered due to timing and no penumbra. Placed on full dose lovenox as thoguht to be hypercoagulable secondary to COVID. Patient was cleared for regular consistency solids, thin liquids. Transferred to [place] for acute inpatient rehab services 4/23/20. (23 Apr 2020 14:26)      PAST MEDICAL & SURGICAL HISTORY:  H/O gastroesophageal reflux (GERD)  HTN (hypertension)  DM (diabetes mellitus)  History of benign brain tumor      MEDICATIONS  (STANDING):  amLODIPine   Tablet 5 milliGRAM(s) Oral daily  ammonium lactate 12% Lotion 1 Application(s) Topical two times a day  atorvastatin 10 milliGRAM(s) Oral at bedtime  BACItracin   Ointment 1 Application(s) Topical daily  dextrose 5%. 1000 milliLiter(s) (50 mL/Hr) IV Continuous <Continuous>  dextrose 50% Injectable 12.5 Gram(s) IV Push once  dextrose 50% Injectable 25 Gram(s) IV Push once  dextrose 50% Injectable 25 Gram(s) IV Push once  famotidine    Tablet 20 milliGRAM(s) Oral daily  FLUoxetine 20 milliGRAM(s) Oral daily  gabapentin 300 milliGRAM(s) Oral at bedtime  hydrALAZINE 50 milliGRAM(s) Oral daily  insulin glargine Injectable (LANTUS) 20 Unit(s) SubCutaneous at bedtime  insulin lispro (HumaLOG) corrective regimen sliding scale   SubCutaneous three times a day before meals  insulin lispro (HumaLOG) corrective regimen sliding scale   SubCutaneous at bedtime  lidocaine   Patch 1 Patch Transdermal daily  melatonin 3 milliGRAM(s) Oral at bedtime  metFORMIN 1000 milliGRAM(s) Oral two times a day with meals  metoprolol tartrate 25 milliGRAM(s) Oral every 12 hours  polyethylene glycol 3350 17 Gram(s) Oral daily  sodium chloride 0.65% Nasal 1 Spray(s) Both Nostrils four times a day  sodium chloride 0.9% Bolus 1000 milliLiter(s) IV Bolus once    MEDICATIONS  (PRN):  acetaminophen   Tablet .. 650 milliGRAM(s) Oral every 6 hours PRN Temp greater or equal to 38C (100.4F), Mild Pain (1 - 3)  benzonatate 100 milliGRAM(s) Oral every 8 hours PRN Cough  dextrose 40% Gel 15 Gram(s) Oral once PRN Blood Glucose LESS THAN 70 milliGRAM(s)/deciliter  glucagon  Injectable 1 milliGRAM(s) IntraMuscular once PRN Glucose LESS THAN 70 milligrams/deciliter  lactulose Syrup 10 Gram(s) Oral daily PRN constipation  saline laxative (FLEET) Rectal Enema 1 Enema Rectal once PRN if no BM by this evening 5/13/20      Allergies    No Known Allergies    Intolerances      PHYSICAL EXAM  69y  Vital Signs Last 24 Hrs  T(C): 36.9 (20 May 2020 09:02), Max: 37 (19 May 2020 22:19)  T(F): 98.4 (20 May 2020 09:02), Max: 98.6 (19 May 2020 22:19)  HR: 73 (20 May 2020 09:02) (70 - 75)  BP: 117/67 (20 May 2020 09:02) (117/65 - 128/65)  BP(mean): --  RR: 16 (20 May 2020 09:02) (15 - 16)  SpO2: 96% (20 May 2020 09:02) (96% - 98%)  Daily     Daily       RECENT LABS:                      CAPILLARY BLOOD GLUCOSE      POCT Blood Glucose.: 114 mg/dL (20 May 2020 08:20)  POCT Blood Glucose.: 113 mg/dL (19 May 2020 22:18)  POCT Blood Glucose.: 139 mg/dL (19 May 2020 17:26) Patient is a 69y old  Female who presents with a chief complaint of left frontal parietal CVA with right HP and aphasia, COVID+ 4/8/20 fu (20 May 2020 12:04)      HPI:  SHIREEN CASH is a 70yo female RH dominant with PMH of HTN, HLD, T2DM, hx CVA, GERD, known RBBB presented to [facility] ED on 4/8/20 with complaints of fever, chills, cough, chest pain, SOB for 5 days. Per patient, her  tested +COVID at an outpatient testing facility a few days prior, patient herself had tested negative at the time. They continued to share the same living space. ED workup showed +COVID and Mobitz type II block. EP was consulted and patient is s/p Medtronic Micra AV+ leadless pacemaker implantation via right femoral vein on 4/10/20. Hydroxychloroquine presumably not given due to prolonged QTc.     On 4/16/20, patient developed acute aphasia and right sided weakness. CT head showed an acute LEFT frontal/parietal CVA. CTA head/neck showed LEFT ICA stenosis but no occlusion. tPA was not administered due to timing and no penumbra. Placed on full dose lovenox as thoguht to be hypercoagulable secondary to COVID. Patient was cleared for regular consistency solids, thin liquids. Transferred to [place] for acute inpatient rehab services 4/23/20. (23 Apr 2020 14:26)      PAST MEDICAL & SURGICAL HISTORY:  H/O gastroesophageal reflux (GERD)  HTN (hypertension)  DM (diabetes mellitus)  History of benign brain tumor      MEDICATIONS  (STANDING):  amLODIPine   Tablet 5 milliGRAM(s) Oral daily  ammonium lactate 12% Lotion 1 Application(s) Topical two times a day  atorvastatin 10 milliGRAM(s) Oral at bedtime  BACItracin   Ointment 1 Application(s) Topical daily  dextrose 5%. 1000 milliLiter(s) (50 mL/Hr) IV Continuous <Continuous>  dextrose 50% Injectable 12.5 Gram(s) IV Push once  dextrose 50% Injectable 25 Gram(s) IV Push once  dextrose 50% Injectable 25 Gram(s) IV Push once  famotidine    Tablet 20 milliGRAM(s) Oral daily  FLUoxetine 20 milliGRAM(s) Oral daily  gabapentin 300 milliGRAM(s) Oral at bedtime  hydrALAZINE 50 milliGRAM(s) Oral daily  insulin glargine Injectable (LANTUS) 20 Unit(s) SubCutaneous at bedtime  insulin lispro (HumaLOG) corrective regimen sliding scale   SubCutaneous three times a day before meals  insulin lispro (HumaLOG) corrective regimen sliding scale   SubCutaneous at bedtime  lidocaine   Patch 1 Patch Transdermal daily  melatonin 3 milliGRAM(s) Oral at bedtime  metFORMIN 1000 milliGRAM(s) Oral two times a day with meals  metoprolol tartrate 25 milliGRAM(s) Oral every 12 hours  polyethylene glycol 3350 17 Gram(s) Oral daily  sodium chloride 0.65% Nasal 1 Spray(s) Both Nostrils four times a day  sodium chloride 0.9% Bolus 1000 milliLiter(s) IV Bolus once    MEDICATIONS  (PRN):  acetaminophen   Tablet .. 650 milliGRAM(s) Oral every 6 hours PRN Temp greater or equal to 38C (100.4F), Mild Pain (1 - 3)  benzonatate 100 milliGRAM(s) Oral every 8 hours PRN Cough  dextrose 40% Gel 15 Gram(s) Oral once PRN Blood Glucose LESS THAN 70 milliGRAM(s)/deciliter  glucagon  Injectable 1 milliGRAM(s) IntraMuscular once PRN Glucose LESS THAN 70 milligrams/deciliter  lactulose Syrup 10 Gram(s) Oral daily PRN constipation  saline laxative (FLEET) Rectal Enema 1 Enema Rectal once PRN if no BM by this evening 5/13/20      Allergies    No Known Allergies    Intolerances      PHYSICAL EXAM  69y  Vital Signs Last 24 Hrs  T(C): 36.9 (20 May 2020 09:02), Max: 37 (19 May 2020 22:19)  T(F): 98.4 (20 May 2020 09:02), Max: 98.6 (19 May 2020 22:19)  HR: 73 (20 May 2020 09:02) (70 - 75)  BP: 117/67 (20 May 2020 09:02) (117/65 - 128/65)  BP(mean): --  RR: 16 (20 May 2020 09:02) (15 - 16)  SpO2: 96% (20 May 2020 09:02) (96% - 98%)  Daily     Daily       RECENT LABS:                      CAPILLARY BLOOD GLUCOSE      POCT Blood Glucose.: 114 mg/dL (20 May 2020 08:20)  POCT Blood Glucose.: 113 mg/dL (19 May 2020 22:18)  POCT Blood Glucose.: 139 mg/dL (19 May 2020 17:26)          EXAM:  CT BRAIN      PROCEDURE DATE:  05/20/2020        INTERPRETATION:  CLINICAL INFORMATION: f/u SAH and CVA. f/u progression of SAH abd CVA. ADMDIAG1: I63.9 CEREBRAL INFARCTION, UNSPECIFIED/.    TECHNIQUE: Sequential axial images were obtained from the vertex to the skull base without intravenous contrast. Coronal and sagittal reformations were obtained.     COMPARISON: Prior CT dated 5/15/2020.    FINDINGS:    Small left frontal subarachnoid hemorrhage is decreased. Encephalomalacia and gliosis in the left frontal and left parietal lobes and right cerebellum suggesting chronic infarcts. Fluid attenuation extra-axial lesion along the left frontotemporal convexity suggesting an arachnoid cyst. No midline shift, hydrocephalus, or effacement of basal cisterns. There are areas of hypodensity in the bilateral hemispheric white matter suggesting chronic white matter microvascular ischemic change. There is cerebral volume loss.    There is no displaced calvarial fracture. The visualized orbits are within normal limits. The visualized portions of the paranasal sinuses are well aerated. The mastoid air cells are well aerated.      IMPRESSION: Small left frontal subarachnoid hemorrhage is decreased. Multiple chronic infarcts.                  DHARMESH RODAS M.D., ATTENDING RADIOLOGIST  This document has been electronically signed. May 20 2020  1:44PM

## 2020-05-20 NOTE — PROVIDER CONTACT NOTE (OTHER) - ACTION/TREATMENT ORDERED:
MD to assess
Per MD Cross okjames to hold accu check prior to lunch. No insulin coverage given.
Give tylenol now for headache, CT scheduled for AM.

## 2020-05-20 NOTE — PROGRESS NOTE ADULT - ASSESSMENT
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     Left frontal parietal CVA and SAH, repeat CT head 5-20 (pending) for interval evaluation   neuro and onc teams assist appreciated factor studies ordered, pending.   holding antiplatelet, lovenox dose     Chest pain resolved  most likely non-cardiac   CAD risk reduction     COVID-19 acute hypoxic respiratory failure resolved   - saturating 99 % on RA now   - respiratory status stable    Essential Hypertension  - BP controlled  - hydralazine, metoprolol, amlodipine     Mobitz type II AV block   - s/p PPM  follow up in clinic     RLE hematoma resorbing  - pain controlled and improving  - vascular sx: not likely compartment syndrome  - h/h stable  - off lovenox given above  - hh stable     Hyponatremia stable   monitor na/time     DM II  - FS controlled, inpatient target 150's   - metformin 1000mg q12, Lantus and ISS    Leukocytosis resolved   - mild without signs of infection  - continue to monitor    Normocytic Anemia stable   - likely due to blood loss given hematoma    Full Code   VTE proph   reviewed w patient and rehab team plan of care   fall risk precautions

## 2020-05-20 NOTE — PROGRESS NOTE ADULT - SUBJECTIVE AND OBJECTIVE BOX
Patient is a 69 year old woman admitted 4/23/20 to [facility].  She had been hospitalized on 4/8/20 with Covid 19 (+). She was found to have a Mobitz type II block and a pacemaker was placed. On 4/16 she was found aphasic with right-sided hemiparesis. A CT head noted a left frontoparietal infarct. A CTA Head and Neck noted Left ICA stenosis without occlusion. She was placed on ASA.  She was also  placed on full dose of lovenox with consideration of hypercoagulable state and COVID -19 (+). Today noted extensive ecchymosis involving the right posterior calf and patient complains of pain involving the same area. She notes mild HA generalized . She denies any change with difficulty with speech. She states no change with right arm weakness and inability to move right leg. She denies other neurological complaints. She denies other complaints. Today, Saturday, 5/9, the patient states the right calf pain is less. She continues with mild HA and no change with neurological complaints. Today,Saturday,  5/16, she denies HA. She denies any change with her neurological complaints. Today, Wednesday, 5/20, she denies HA. She states slight increased strength RUE.    PMH: As above.          HTN          HLD         DM             SH: No allergy    Exam: Awake, alert, speaks to this physician in [place]           Speech- decreased fluency, responds with 1-2 and occasionally 3 words in response to questions and searches for words, follows commands promptly, no dysarthria           Pupils 2.5mm, reactive, RHH              Motor tone and strength-RUE-4+/5      LUE 5/5                                                 RLE-0/5,      LLE 5/5        D-Dimer Assay, Quantitative: 336 ng/mL DDU (05.18.20 @ 09:45)        COVID-19 PCR . (05.13.20 @ 16:00)    COVID-19 PCR: NotDetec: This test has been validated by [facility] to be accurate;  though it has not been FDA cleared/approved by the usual pathway.  As with all laboratory tests, results should be correlated with clinical  findings.  https://www.fda.gov/media/994298/download  https://www.fda.gov/media/632375/download        < from: CT Head No Cont (05.20.20 @ 13:23) >    COMPARISON: Prior CT dated 5/15/2020.    FINDINGS:    Small left frontal subarachnoid hemorrhage is decreased. Encephalomalacia and gliosis in the left frontal and left parietal lobes and right cerebellum suggesting chronic infarcts. Fluid attenuation extra-axial lesion along the left frontotemporal convexity suggesting an arachnoid cyst. No midline shift, hydrocephalus, or effacement of basal cisterns. There are areas of hypodensity in the bilateral hemispheric white matter suggesting chronic white matter microvascular ischemic change. There is cerebral volume loss.    There is no displaced calvarial fracture. The visualized orbits are within normal limits. The visualized portions of the paranasal sinuses are well aerated. The mastoid air cells are well aerated.      IMPRESSION: Small left frontal subarachnoid hemorrhage is decreased. Multiple chronic infarcts.      < end of copied text >          < from: CT Head No Cont (05.15.20 @ 09:33) >    FINDINGS:   05/12/2020 available for review.    The brain demonstrates chronic infarctions in the LEFT frontal and parietal lobes with unchanged focus of subarachnoid hemorrhage in the LEFT frontal infarction. Minimal subarachnoid hemorrhage also seen within the RIGHT lateral perimesencephalic cistern. Old infarction again noted in the RIGHT cerebellum. Stable 2.5 cm arachnoid cyst in the anterior LEFT middle cranial fossa.     No mass effect is found in the brain.      The ventricles, sulci and basal cisterns appear unremarkable.    The orbits are unremarkable.  The paranasal sinuses are clear.  The nasal cavity appears intact.  The nasopharynx is symmetric.  The central skull base, petrous temporal bones and calvarium remain intact.      IMPRESSION:   Chronic infarctions in the LEFT frontal and parietal lobes with unchanged focus of subarachnoid hemorrhage in the LEFT frontal infarction. Minimal subarachnoid hemorrhage also seen within the RIGHT lateral perimesencephalic cistern. Old infarction again noted in the RIGHT cerebellum. Stable 2.5 cm arachnoid cyst in the anterior LEFT middle cranial fossa.              < from: CT Head No Cont (05.12.20 @ 10:58) >    FINDINGS:      As compared to prior study, there is no significant interval change. Again there are ischemic changes in the left hemisphere with a now mature appearance of the left the MCA and OLU infarcts are present on 4/16/2020. Again noted is low-level blood in the left the anterior paracentral region. There is no evidence of rebleeding or new hemorrhage.    Also noted are involutional changes in both hemispheres with volume loss.    Ventricles are midline    Chronic ischemic changes are again noted in the cerebellum.    A left temporal fossa arachnoid cyst is again noted.    IMPRESSION:    1)  stable follow-up study with no significant change.  2)  follow-up MR imaging recommended for further assessment.                < end of copied text >                 < from: CT Head No Cont (05.10.20 @ 09:18) >    TECHNIQUE: Noncontrast CT of the head. Multiplanar reformations are submitted.    FINDINGS: Mild subarachnoid hemorrhage in the left frontal lobe, unchanged. Stable chronic left MCA territory infarcts. Stable chronic right inferior cerebellar hemisphere infarcts. Consider MRI as clinically warranted. Stable left frontal lobe arachnoid cyst.    There is periventricular and subcortical white matter hypodensity without mass effect, nonspecific, likely representing mild chronic microvascular ischemic changes. There is no compelling evidence for an acute transcortical infarction. There is no evidence of mass, mass effect, midline shift or extra-axial fluid collection. The lateral ventricles and cortical sulci are age-appropriate in size and configuration. The orbits, mastoid air cells and visualized paranasal sinuses are unremarkable. The calvariumis intact.    IMPRESSION: Mild subarachnoid hemorrhage in the left frontal lobe, unchanged. No hydrocephalus. No new hemorrhage.      < from: CT Head No Cont (05.09.20 @ 17:50) >    FINDINGS: Small arachnoid cyst adjacent to the left sylvian fissure on image 8, series 2 measures 2.2 x 2.2 cm, unchanged in size but demonstrates subtle increased density within it.     Subacute/chronic left frontal infarct with mild subarachnoid hemorrhage is demonstrated on image 15, series 2.    Evolving subacute/chronic left parietal lobe infarct. Chronic right inferior cerebellar hemisphere infarct.    There is periventricular and subcortical white matter hypodensity without mass effect, nonspecific, likely representing mild chronic microvascular ischemic changes. There is no compelling evidence for an acute transcortical infarction. There is no other evidence of mass, mass effect, midline shift or extra-axial fluid collection. The lateral ventricles and cortical sulci are age-appropriate in size and configuration. The orbits, mastoid air cells and visualized paranasal sinuses are unremarkable. The calvarium is intact.    IMPRESSION:  New mild subarachnoid hemorrhage in the paramedian left frontal lobe.Evolving subacute/chronic left MCA territory infarcts.. Findings discussed with Dr. Cross.          <

## 2020-05-20 NOTE — PROGRESS NOTE ADULT - SUBJECTIVE AND OBJECTIVE BOX
Sitting up in wheelchair     Points to her   R calf   tells me its tender     but better     smiles, r facial drop same     ros all others are neg     Vital Signs Last 24 Hrs    T(C): 36.9 (20 May 2020 09:02), Max: 37 (19 May 2020 22:19)  T(F): 98.4 (20 May 2020 09:02), Max: 98.6 (19 May 2020 22:19)  HR: 73 (20 May 2020 09:02) (70 - 75)  BP: 117/67 (20 May 2020 09:02) (117/65 - 128/65)  BP(mean): --  RR: 16 (20 May 2020 09:02) (15 - 16)  SpO2: 96% (20 May 2020 09:02) (96% - 98%)    MEDICATIONS  (STANDING):  amLODIPine   Tablet 5 milliGRAM(s) Oral daily  ammonium lactate 12% Lotion 1 Application(s) Topical two times a day  atorvastatin 10 milliGRAM(s) Oral at bedtime  BACItracin   Ointment 1 Application(s) Topical daily  dextrose 5%. 1000 milliLiter(s) (50 mL/Hr) IV Continuous <Continuous>  dextrose 50% Injectable 12.5 Gram(s) IV Push once  dextrose 50% Injectable 25 Gram(s) IV Push once  dextrose 50% Injectable 25 Gram(s) IV Push once  famotidine    Tablet 20 milliGRAM(s) Oral daily  FLUoxetine 20 milliGRAM(s) Oral daily  gabapentin 300 milliGRAM(s) Oral at bedtime  hydrALAZINE 50 milliGRAM(s) Oral daily  insulin glargine Injectable (LANTUS) 20 Unit(s) SubCutaneous at bedtime  insulin lispro (HumaLOG) corrective regimen sliding scale   SubCutaneous three times a day before meals  insulin lispro (HumaLOG) corrective regimen sliding scale   SubCutaneous at bedtime  lidocaine   Patch 1 Patch Transdermal daily  melatonin 3 milliGRAM(s) Oral at bedtime  metFORMIN 1000 milliGRAM(s) Oral two times a day with meals  metoprolol tartrate 25 milliGRAM(s) Oral every 12 hours  polyethylene glycol 3350 17 Gram(s) Oral daily  sodium chloride 0.65% Nasal 1 Spray(s) Both Nostrils four times a day  sodium chloride 0.9% Bolus 1000 milliLiter(s) IV Bolus once

## 2020-05-20 NOTE — PROGRESS NOTE ADULT - ASSESSMENT
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 - Holding due to finding of SAH. HEAD CT : Small left frontal subarachnoid hemorrhage is decreased. Multiple chronic infarcts.  - cont prozac for motor recovery. Increase to 20 mg daily for mood d/o , tolerating, improved  -neuropsychology supportive counseling  -right SAFO as outpatient. Continue ACe wrapping for foot drop for now  -Head CT : new Mild subarachnoid hemorrhage in the left frontal lobe stable on repeat imaging 5/10  -repeat CT head  - STABLE NO NEW BLEED  -Head CT, 5/15 - Left SAH and new righ tlateral perimesencephalic SAH. stable 2.5 cm L arachnoid cyst.  -neuro consult appreciated 5/15. Hold off on MRI,    # r/o coagulopathy due to spontaneous SAH and RLE hematoma:  -Heme/onc consulted for ?hypo-coagulopathy:.  consult read and appreciated:  ·	Coagulopathy: agreed with holding anticoagulation, stroke likely secondary to COVID 19+  ·	Bleeding diathesis: Will check factor V,  VII, VIII, IX, XII, XIII. fibrinogen, coags, VWD.   ·	Give Vitamin K 10 mg daily x 2 doses - to discuss with heme onc prior to administration     -PRECAUTIONS: airborne, contact, aspiration, cardiac, fall    #Chest pain  - resolved  -EKG NSR, troponin negative, DDimer downtrending  -appears musculoskeletal & 2/2 to positioning  -encourage OOB, tylenol prn pain    #RLE pain improving overall  - Doppler negative DVT ,   -  CT RLE with finding of hematoma, vascular consulted would monitor for now, can continue dvt ppx given risk of COVID - appears improved on exam   - LE ANGEL performed - with +moderate PAD  -Xray right knee  negative for acute fracture  Xray right ankle  negative for fracture +calcaneal spur    #Acute Hypoxic Respiratory Failure secondary to COVID-19 PNA  Date of COVID testin20  - Hydroxychloroquine not given, pt with prolonged QTc  - EKG - QTc: 500 (), incomplete RBBB  - Incentive spirometry, robitussin DM, ocean spray  -patient tolerating therapies without O2  -COVID reswab NEGATIVE     #UTI  -completed macrobid 5/15  -urine cultures - +E coli >100k macrobid sensitive  -mild leukocytosis 11.72 . Afebrile, symptoms improved  CBC  - 10.27 stable     #BRADY 2/2 to dehydration vs UTI - resolved  - encourage po fluids - monitor renal indices  stable    #HTN  - Lopressor 25 q12, amlodipine 5 qd, hydralazine 50mg qd  controlled    #type II Mobitz block, known RBBB  - s/p Medtronic Micra AV+ leadless pacemaker  - outpt EP followup    #T2DM.  - SSI ACHS  -diabetic educator consult appreciated  continue metformin 100 mg BID  -lantus 20 units qhs  controlled     #HLD  - cont simvastatin 20 qhs    #insomnia  - melatonin 3    #Pain Mgmt   - Tylenol PRN    #GI/Bowel Mgmt   - Continent  - pepcid    #/Bladder Mgmt   - Continent, PVR negative    #FEN   - Diet - Regular + Thins  [[facility]]  DASH/TLC      #skin:  abrasion back. bacitracin and DSD daily     #- DVT PPX  - on hold for bleed.     #Case discussed in IDT rounds :  max assist toilet transfers, stnad-pivot mod assist limited by pain, Mod assist transfers. Deficits i nproblem-solving, comprehension, memory, Has some improvement in insight which may also impact mood  -goals: mod assist bADLs, transfers, ambulation , requires 24 hours supervision  -patient dose not appear to have that level of support at home at this time. 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 - Holding due to finding of SAH. HEAD CT : Small left frontal subarachnoid hemorrhage is decreased. Multiple chronic infarcts.  - cont prozac for motor recovery. Increase to 20 mg daily for mood d/o , tolerating, improved  -neuropsychology supportive counseling  -right SAFO as outpatient. Continue ACe wrapping for foot drop for now  -Head CT : new Mild subarachnoid hemorrhage in the left frontal lobe stable on repeat imaging 5/10  -repeat CT head  - STABLE NO NEW BLEED  -Head CT, 5/15 - Left SAH and new righ tlateral perimesencephalic SAH. stable 2.5 cm L arachnoid cyst.  -Head CT : imrpovement in left frontal SAH no new bleed    # r/o coagulopathy due to spontaneous SAH and RLE hematoma:  -Heme/onc consulted for ?hypo-coagulopathy:.  consult read and appreciated:  ·	Coagulopathy: agreed with holding anticoagulation, stroke likely secondary to COVID 19+  ·	Bleeding diathesis: Will check factor V,  VII, VIII, IX, XII, XIII. fibrinogen, coags, VWD.   ·	Give Vitamin K 10 mg daily x 2 doses - to discuss with heme onc prior to administration as Head CT improved, no new areas bleed, and with multiple infarcts, as recommended by hospitalist     -PRECAUTIONS: airborne, contact, aspiration, cardiac, fall    #Chest pain  - resolved  -EKG NSR, troponin negative, DDimer downtrending  -appears musculoskeletal & 2/2 to positioning  -encourage OOB, tylenol prn pain    #RLE pain improving overall  - Doppler negative DVT ,   -  CT RLE with finding of hematoma, vascular consulted would monitor for now, can continue dvt ppx given risk of COVID - appears improved on exam   - LE ANGEL performed - with +moderate PAD  -Xray right knee  negative for acute fracture  Xray right ankle  negative for fracture +calcaneal spur    #Acute Hypoxic Respiratory Failure secondary to COVID-19 PNA  Date of COVID testin20  - Hydroxychloroquine not given, pt with prolonged QTc  - EKG - QTc: 500 (), incomplete RBBB  - Incentive spirometry, robitussin DM, ocean spray  -patient tolerating therapies without O2  -COVID reswab NEGATIVE     #UTI  -completed macrobid 5/15  -urine cultures - +E coli >100k macrobid sensitive  -mild leukocytosis 11.72 . Afebrile, symptoms improved  CBC  - 10.27 stable     #BRADY 2/2 to dehydration vs UTI - resolved  - encourage po fluids - monitor renal indices  stable    #HTN  - Lopressor 25 q12, amlodipine 5 qd, hydralazine 50mg qd  controlled    #type II Mobitz block, known RBBB  - s/p Medtronic Micra AV+ leadless pacemaker  - outpt EP followup    #T2DM.  - SSI ACHS  -diabetic educator consult appreciated  continue metformin 100 mg BID  -lantus 20 units qhs  controlled     #HLD  - cont simvastatin 20 qhs    #insomnia  - melatonin 3    #Pain Mgmt   - Tylenol PRN    #GI/Bowel Mgmt   - Continent  - pepcid    #/Bladder Mgmt   - Continent, PVR negative    #FEN   - Diet - Regular + Thins  [[facility]]  DASH/TLC      #skin:  abrasion back. bacitracin and DSD daily     #- DVT PPX  - on hold for bleed.     #Case discussed in IDT rounds :  -patient requires min assist toileting, CG/min assist transfers, ambulation  feet RW, total assist stairs,  -min assist bADLs, supervision, improved expression and word finding, min assist shower transfers, CG ambulation  -target dc /27. Staff recommending dc home with caregiver support; will be at lesser (min assist level) but family not present for many hours during day.Will re-present to family to see if other arrangements can be made      #LABS  CBC BMP

## 2020-05-21 LAB
ALBUMIN SERPL ELPH-MCNC: 3.9 G/DL — SIGNIFICANT CHANGE UP (ref 3.3–5)
ALP SERPL-CCNC: 52 U/L — SIGNIFICANT CHANGE UP (ref 40–120)
ALT FLD-CCNC: 16 U/L — SIGNIFICANT CHANGE UP (ref 10–45)
ANION GAP SERPL CALC-SCNC: 11 MMOL/L — SIGNIFICANT CHANGE UP (ref 5–17)
AST SERPL-CCNC: 19 U/L — SIGNIFICANT CHANGE UP (ref 10–40)
BASOPHILS # BLD AUTO: 0.06 K/UL — SIGNIFICANT CHANGE UP (ref 0–0.2)
BASOPHILS NFR BLD AUTO: 0.7 % — SIGNIFICANT CHANGE UP (ref 0–2)
BILIRUB SERPL-MCNC: 0.7 MG/DL — SIGNIFICANT CHANGE UP (ref 0.2–1.2)
BUN SERPL-MCNC: 14 MG/DL — SIGNIFICANT CHANGE UP (ref 7–23)
CALCIUM SERPL-MCNC: 9.3 MG/DL — SIGNIFICANT CHANGE UP (ref 8.4–10.5)
CHLORIDE SERPL-SCNC: 101 MMOL/L — SIGNIFICANT CHANGE UP (ref 96–108)
CO2 SERPL-SCNC: 25 MMOL/L — SIGNIFICANT CHANGE UP (ref 22–31)
CREAT SERPL-MCNC: 0.83 MG/DL — SIGNIFICANT CHANGE UP (ref 0.5–1.3)
EOSINOPHIL # BLD AUTO: 0.2 K/UL — SIGNIFICANT CHANGE UP (ref 0–0.5)
EOSINOPHIL NFR BLD AUTO: 2.2 % — SIGNIFICANT CHANGE UP (ref 0–6)
FACT XIII ACT/NOR PPP CHRO: 117 % — SIGNIFICANT CHANGE UP (ref 51–163)
GLUCOSE SERPL-MCNC: 101 MG/DL — HIGH (ref 70–99)
HCT VFR BLD CALC: 33.8 % — LOW (ref 34.5–45)
HGB BLD-MCNC: 11.2 G/DL — LOW (ref 11.5–15.5)
IMM GRANULOCYTES NFR BLD AUTO: 0.8 % — SIGNIFICANT CHANGE UP (ref 0–1.5)
LYMPHOCYTES # BLD AUTO: 2.51 K/UL — SIGNIFICANT CHANGE UP (ref 1–3.3)
LYMPHOCYTES # BLD AUTO: 28.2 % — SIGNIFICANT CHANGE UP (ref 13–44)
MCHC RBC-ENTMCNC: 29 PG — SIGNIFICANT CHANGE UP (ref 27–34)
MCHC RBC-ENTMCNC: 33.1 GM/DL — SIGNIFICANT CHANGE UP (ref 32–36)
MCV RBC AUTO: 87.6 FL — SIGNIFICANT CHANGE UP (ref 80–100)
MONOCYTES # BLD AUTO: 0.74 K/UL — SIGNIFICANT CHANGE UP (ref 0–0.9)
MONOCYTES NFR BLD AUTO: 8.3 % — SIGNIFICANT CHANGE UP (ref 2–14)
NEUTROPHILS # BLD AUTO: 5.31 K/UL — SIGNIFICANT CHANGE UP (ref 1.8–7.4)
NEUTROPHILS NFR BLD AUTO: 59.8 % — SIGNIFICANT CHANGE UP (ref 43–77)
NRBC # BLD: 0 /100 WBCS — SIGNIFICANT CHANGE UP (ref 0–0)
PLATELET # BLD AUTO: 460 K/UL — HIGH (ref 150–400)
POTASSIUM SERPL-MCNC: 4.2 MMOL/L — SIGNIFICANT CHANGE UP (ref 3.5–5.3)
POTASSIUM SERPL-SCNC: 4.2 MMOL/L — SIGNIFICANT CHANGE UP (ref 3.5–5.3)
PROT SERPL-MCNC: 7.6 G/DL — SIGNIFICANT CHANGE UP (ref 6–8.3)
RBC # BLD: 3.86 M/UL — SIGNIFICANT CHANGE UP (ref 3.8–5.2)
RBC # FLD: 16.3 % — HIGH (ref 10.3–14.5)
SODIUM SERPL-SCNC: 137 MMOL/L — SIGNIFICANT CHANGE UP (ref 135–145)
WBC # BLD: 8.89 K/UL — SIGNIFICANT CHANGE UP (ref 3.8–10.5)
WBC # FLD AUTO: 8.89 K/UL — SIGNIFICANT CHANGE UP (ref 3.8–10.5)

## 2020-05-21 PROCEDURE — 99233 SBSQ HOSP IP/OBS HIGH 50: CPT

## 2020-05-21 RX ADMIN — Medication 50 MILLIGRAM(S): at 06:58

## 2020-05-21 RX ADMIN — METFORMIN HYDROCHLORIDE 1000 MILLIGRAM(S): 850 TABLET ORAL at 17:33

## 2020-05-21 RX ADMIN — METFORMIN HYDROCHLORIDE 1000 MILLIGRAM(S): 850 TABLET ORAL at 08:51

## 2020-05-21 RX ADMIN — Medication 1 SPRAY(S): at 12:24

## 2020-05-21 RX ADMIN — ATORVASTATIN CALCIUM 10 MILLIGRAM(S): 80 TABLET, FILM COATED ORAL at 22:48

## 2020-05-21 RX ADMIN — Medication 1 SPRAY(S): at 17:33

## 2020-05-21 RX ADMIN — INSULIN GLARGINE 20 UNIT(S): 100 INJECTION, SOLUTION SUBCUTANEOUS at 22:49

## 2020-05-21 RX ADMIN — Medication 20 MILLIGRAM(S): at 12:23

## 2020-05-21 RX ADMIN — Medication 1 SPRAY(S): at 06:57

## 2020-05-21 RX ADMIN — AMLODIPINE BESYLATE 5 MILLIGRAM(S): 2.5 TABLET ORAL at 06:57

## 2020-05-21 RX ADMIN — Medication 1 APPLICATION(S): at 17:32

## 2020-05-21 RX ADMIN — Medication 81 MILLIGRAM(S): at 12:23

## 2020-05-21 RX ADMIN — Medication 3 MILLIGRAM(S): at 22:49

## 2020-05-21 RX ADMIN — GABAPENTIN 300 MILLIGRAM(S): 400 CAPSULE ORAL at 22:49

## 2020-05-21 RX ADMIN — ENOXAPARIN SODIUM 40 MILLIGRAM(S): 100 INJECTION SUBCUTANEOUS at 12:23

## 2020-05-21 RX ADMIN — Medication 650 MILLIGRAM(S): at 22:00

## 2020-05-21 RX ADMIN — FAMOTIDINE 20 MILLIGRAM(S): 10 INJECTION INTRAVENOUS at 12:23

## 2020-05-21 RX ADMIN — Medication 25 MILLIGRAM(S): at 06:58

## 2020-05-21 RX ADMIN — Medication 1 APPLICATION(S): at 06:57

## 2020-05-21 RX ADMIN — Medication 650 MILLIGRAM(S): at 09:42

## 2020-05-21 RX ADMIN — Medication 25 MILLIGRAM(S): at 17:33

## 2020-05-21 RX ADMIN — LIDOCAINE 1 PATCH: 4 CREAM TOPICAL at 17:33

## 2020-05-21 RX ADMIN — LIDOCAINE 1 PATCH: 4 CREAM TOPICAL at 19:00

## 2020-05-21 NOTE — PROGRESS NOTE ADULT - ASSESSMENT
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     Left frontal parietal CVA and SAH, repeat CT head 5-20 (pending) for interval evaluation   neuro and onc teams assist appreciated factor studies ordered, pending.   holding antiplatelet, lovenox dose   fall risk precautions     Chest pain resolved  most likely non-cardiac   CAD risk reduction     COVID-19 acute hypoxic respiratory failure resolved   - saturating 99 % on RA now   - respiratory status stable    Essential Hypertension  - BP controlled  - hydralazine, metoprolol, amlodipine     Mobitz type II AV block   - s/p PPM  follow up in clinic     RLE hematoma resorbing  - pain controlled and improving  - vascular sx: not likely compartment syndrome  - h/h stable  - off lovenox given above  - hh stable     Hyponatremia stable   monitor na/time     DM II  - FS controlled, inpatient target 150's   - metformin 1000mg q12, Lantus and ISS    Leukocytosis resolved   - mild without signs of infection  - continue to monitor    Normocytic Anemia stable   - likely due to blood loss given hematoma    Full Code   VTE proph   reviewed w patient and rehab team plan of care

## 2020-05-21 NOTE — PROGRESS NOTE ADULT - ASSESSMENT
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 - Holding due to finding of SAH. HEAD CT : Small left frontal subarachnoid hemorrhage is decreased. Multiple chronic infarcts.  - cont prozac for motor recovery. Increase to 20 mg daily for mood d/o , tolerating, improved  -neuropsychology supportive counseling  -right SAFO as outpatient. Continue ACe wrapping for foot drop for now  -Head CT : new Mild subarachnoid hemorrhage in the left frontal lobe stable on repeat imaging 5/10  -repeat CT head  - STABLE NO NEW BLEED  -Head CT, 5/15 - Left SAH and new righ tlateral perimesencephalic SAH. stable 2.5 cm L arachnoid cyst.  -Head CT : imrpovement in left frontal SAH no new bleed    # r/o coagulopathy due to spontaneous SAH and RLE hematoma:  -Heme/onc consulted for ?hypo-coagulopathy:.  consult read and appreciated:  ·	Coagulopathy: agreed with holding anticoagulation, stroke likely secondary to COVID 19+  ·	Bleeding diathesis: Will check factor V,  VII, VIII, IX, XII, XIII. fibrinogen, coags, VWD.   ·	Give Vitamin K 10 mg daily x 2 doses - to discuss with heme onc prior to administration as Head CT improved, no new areas bleed, and with multiple infarcts, as recommended by hospitalist     -PRECAUTIONS: airborne, contact, aspiration, cardiac, fall    #Chest pain  - resolved  -EKG NSR, troponin negative, DDimer downtrending  -appears musculoskeletal & 2/2 to positioning  -encourage OOB, tylenol prn pain    #RLE pain improving overall  - Doppler negative DVT ,   -  CT RLE with finding of hematoma, vascular consulted would monitor for now, can continue dvt ppx given risk of COVID - appears improved on exam   - LE ANGEL performed - with +moderate PAD  -Xray right knee  negative for acute fracture  Xray right ankle  negative for fracture +calcaneal spur    #Acute Hypoxic Respiratory Failure secondary to COVID-19 PNA  Date of COVID testin20  - Hydroxychloroquine not given, pt with prolonged QTc  - EKG - QTc: 500 (), incomplete RBBB  - Incentive spirometry, robitussin DM, ocean spray  -patient tolerating therapies without O2  -COVID reswab NEGATIVE     #UTI  -completed macrobid 5/15  -urine cultures - +E coli >100k macrobid sensitive  -mild leukocytosis 11.72 . Afebrile, symptoms improved  CBC  - 10.27 stable     #BRADY 2/2 to dehydration vs UTI - resolved  - encourage po fluids - monitor renal indices  stable    #HTN  - Lopressor 25 q12, amlodipine 5 qd, hydralazine 50mg qd  controlled    #type II Mobitz block, known RBBB  - s/p Medtronic Micra AV+ leadless pacemaker  - outpt EP followup    #T2DM.  - SSI ACHS  -diabetic educator consult appreciated  continue metformin 100 mg BID  -lantus 20 units qhs  controlled     #HLD  - cont simvastatin 20 qhs    #insomnia  - melatonin 3    #Pain Mgmt   - Tylenol PRN    #GI/Bowel Mgmt   - Continent  - pepcid    #/Bladder Mgmt   - Continent, PVR negative    #FEN   - Diet - Regular + Thins  [[facility]]  DASH/TLC      #skin:  abrasion back. bacitracin and DSD daily     #- DVT PPX  - on hold for bleed.     #Case discussed in IDT rounds :  -patient requires min assist toileting, CG/min assist transfers, ambulation  feet RW, total assist stairs,  -min assist bADLs, supervision, improved expression and word finding, min assist shower transfers, CG ambulation  -target dc /27. 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 stable 2.5 cm L arachnoid cyst.  HEAD CT 5/20: Small left frontal subarachnoid hemorrhage is decreased. Multiple chronic infarcts.  - Was on ASA, lovenox therapeutic dose as may be hypercoagulable due to COVID. Neuro f/u 5/20 appreciated, cleared to resume ASA 81 mg daily, Restarted PPX lovenox 5/20 after stable Head CT  -repeat Head CT next Wed 5/27 (1 week)   - cont prozac for motor recovery. Increase to 20 mg daily for mood d/o 5/13, tolerating, improved  -neuropsychology supportive counseling  -right SAFO as outpatient. Continue ACe wrapping for foot drop for now      # r/o coagulopathy due to spontaneous SAH and RLE hematoma:  -Heme/onc consulted for ?hypo-coagulopathy:. 5/18 consult read and appreciated:  ·	Coagulopathy: agreed with holding anticoagulation, stroke likely secondary to COVID 19+  ·	Bleeding diathesis: Will check factor V,  VII, VIII, IX, XII, XIII. fibrinogen, coags, VWD.   ·	 Head CT improved, no new areas bleed, and with multiple infarcts, as recommended by hospitalist. Vit K held as agreed by heme and hospitalist       #Chest pain 5/18 - resolved  -EKG NSR, troponin negative, DDimer downtrending  -appears musculoskeletal & 2/2 to positioning  -encourage OOB, tylenol prn pain    #RLE pain: nearly resolved  - Doppler negative DVT 4/28, 5/7  - 5/8 CT RLE with finding of hematoma, vascular consulted would monitor for now, can continue dvt ppx given risk of COVID - appears improved on exam   -5/8 LE ANGEL performed - with +moderate PAD  -Xray right knee 4/30 negative for acute fracture  Xray right ankle 5/12 negative for fracture +calcaneal spur    #Acute Hypoxic Respiratory Failure secondary to COVID-19 PNA 4/8  - Hydroxychloroquine not given, pt with prolonged QTc  - EKG - QTc: 500 (4/14), incomplete RBBB  - Incentive spirometry, robitussin DM, ocean spray  -patient tolerating therapies without O2  -COVID reswab NEGATIVE 5/13    #UTI  -completed macrobid 5/15  -urine cultures - +E coli >100k macrobid sensitive  -mild leukocytosis 11.72 5/14. Afebrile, symptoms improved  CBC 5/18 - 10.27 stable --> 8.89 5/21  CBC 5/24    #BRADY 2/2 to dehydration vs UTI - resolved  - encourage po fluids . BUn /Cr 14/0.83 5/21  BMp 5/24    #HTN  - Lopressor 25 q12, amlodipine 5 qd, hydralazine 50mg qd  controlled 5/21    #type II Mobitz block, known RBBB  - s/p Medtronic Micra AV+ leadless pacemaker  - outpt EP followup    #T2DM.  - SSI ACHS  -diabetic educator consult appreciated  continue metformin 100 mg BID  -lantus 20 units qhs  controlled 5/21. FS reduced to BID    #HLD  - cont simvastatin 20 qhs    #insomnia  - melatonin 3    #Pain Mgmt   - Tylenol PRN    #GI/Bowel Mgmt   - Continent  - pepcid    #/Bladder Mgmt   - Continent, PVR negative    #FEN   - Diet - Regular + Thins  [[facility]]  DASH/TLC      #skin:  abrasion back. bacitracin and DSD daily 4/29    #- DVT PPX  - on hold for bleed.     #Case discussed in IDT rounds 5/20:  -patient requires min assist toileting, CG/min assist transfers, ambulation  feet RW, total assist stairs,  -min assist bADLs, supervision, improved expression and word finding, min assist shower transfers, CG ambulation  -target dc /27. Staff recommending dc home with caregiver support; will be at lesser (min assist level) but family not present for many hours during day.Will re-present to family to see if other arrangements can be made  per patient, has family member Saima Coffey who can assist at duration recommended by staff. Will need to arrange in-person training, likely 2-3 sessions estimated. SW and patient informed. continue to provide supportive counseling as needed as patient expresses frustration with duration of hospitalization    #LABS  CBC BMP 5/24  head CT next week

## 2020-05-21 NOTE — PROGRESS NOTE ADULT - SUBJECTIVE AND OBJECTIVE BOX
Patient is a 69y old  Female who presents with a chief complaint of left frontal parietal CVA with right HP and aphasia, COVID+ 4/8/20 fu (21 May 2020 12:46)      HPI:  SHIREEN CASH is a 68yo female RH dominant with PMH of HTN, HLD, T2DM, hx CVA, GERD, known RBBB presented to [facility] ED on 4/8/20 with complaints of fever, chills, cough, chest pain, SOB for 5 days. Per patient, her  tested +COVID at an outpatient testing facility a few days prior, patient herself had tested negative at the time. They continued to share the same living space. ED workup showed +COVID and Mobitz type II block. EP was consulted and patient is s/p Medtronic Micra AV+ leadless pacemaker implantation via right femoral vein on 4/10/20. Hydroxychloroquine presumably not given due to prolonged QTc.     On 4/16/20, patient developed acute aphasia and right sided weakness. CT head showed an acute LEFT frontal/parietal CVA. CTA head/neck showed LEFT ICA stenosis but no occlusion. tPA was not administered due to timing and no penumbra. Placed on full dose lovenox as thoguht to be hypercoagulable secondary to COVID. Patient was cleared for regular consistency solids, thin liquids. Transferred to [place] for acute inpatient rehab services 4/23/20. (23 Apr 2020 14:26)      PAST MEDICAL & SURGICAL HISTORY:  H/O gastroesophageal reflux (GERD)  HTN (hypertension)  DM (diabetes mellitus)  History of benign brain tumor      MEDICATIONS  (STANDING):  amLODIPine   Tablet 5 milliGRAM(s) Oral daily  ammonium lactate 12% Lotion 1 Application(s) Topical two times a day  aspirin enteric coated 81 milliGRAM(s) Oral daily  atorvastatin 10 milliGRAM(s) Oral at bedtime  dextrose 5%. 1000 milliLiter(s) (50 mL/Hr) IV Continuous <Continuous>  dextrose 50% Injectable 12.5 Gram(s) IV Push once  dextrose 50% Injectable 25 Gram(s) IV Push once  dextrose 50% Injectable 25 Gram(s) IV Push once  enoxaparin Injectable 40 milliGRAM(s) SubCutaneous daily  famotidine    Tablet 20 milliGRAM(s) Oral daily  FLUoxetine 20 milliGRAM(s) Oral daily  gabapentin 300 milliGRAM(s) Oral at bedtime  hydrALAZINE 50 milliGRAM(s) Oral daily  insulin glargine Injectable (LANTUS) 20 Unit(s) SubCutaneous at bedtime  insulin lispro (HumaLOG) corrective regimen sliding scale   SubCutaneous at bedtime  insulin lispro (HumaLOG) corrective regimen sliding scale   SubCutaneous before breakfast  lidocaine   Patch 1 Patch Transdermal daily  melatonin 3 milliGRAM(s) Oral at bedtime  metFORMIN 1000 milliGRAM(s) Oral two times a day with meals  metoprolol tartrate 25 milliGRAM(s) Oral every 12 hours  polyethylene glycol 3350 17 Gram(s) Oral daily  sodium chloride 0.65% Nasal 1 Spray(s) Both Nostrils four times a day  sodium chloride 0.9% Bolus 1000 milliLiter(s) IV Bolus once    MEDICATIONS  (PRN):  acetaminophen   Tablet .. 650 milliGRAM(s) Oral every 6 hours PRN Temp greater or equal to 38C (100.4F), Mild Pain (1 - 3)  benzonatate 100 milliGRAM(s) Oral every 8 hours PRN Cough  dextrose 40% Gel 15 Gram(s) Oral once PRN Blood Glucose LESS THAN 70 milliGRAM(s)/deciliter  glucagon  Injectable 1 milliGRAM(s) IntraMuscular once PRN Glucose LESS THAN 70 milligrams/deciliter  lactulose Syrup 10 Gram(s) Oral daily PRN constipation  saline laxative (FLEET) Rectal Enema 1 Enema Rectal once PRN if no BM by this evening 5/13/20      Allergies    No Known Allergies    Intolerances          VITALS  69y  Vital Signs Last 24 Hrs  T(C): 36.7 (21 May 2020 08:56), Max: 36.9 (20 May 2020 21:00)  T(F): 98 (21 May 2020 08:56), Max: 98.5 (20 May 2020 21:00)  HR: 64 (21 May 2020 08:56) (64 - 77)  BP: 113/61 (21 May 2020 08:56) (113/61 - 143/74)  BP(mean): --  RR: 15 (21 May 2020 08:56) (14 - 15)  SpO2: 96% (21 May 2020 08:56) (95% - 96%)  Daily     Daily         RECENT LABS:                          11.2   8.89  )-----------( 460      ( 21 May 2020 08:50 )             33.8     05-21    137  |  101  |  14  ----------------------------<  101<H>  4.2   |  25  |  0.83    Ca    9.3      21 May 2020 08:50    TPro  7.6  /  Alb  3.9  /  TBili  0.7  /  DBili  x   /  AST  19  /  ALT  16  /  AlkPhos  52  05-21    LIVER FUNCTIONS - ( 21 May 2020 08:50 )  Alb: 3.9 g/dL / Pro: 7.6 g/dL / ALK PHOS: 52 U/L / ALT: 16 U/L / AST: 19 U/L / GGT: x                   CAPILLARY BLOOD GLUCOSE      POCT Blood Glucose.: 79 mg/dL (21 May 2020 07:39)  POCT Blood Glucose.: 124 mg/dL (20 May 2020 22:24)          Review of Systems:   · Additional ROS	Patient seen in OT with assistance language line  at 10:25 AM. Patient was ambualting with RW, right ankle wrapped for support foot drop pending AFO fabrication as outpatient.  Mild pain with standing activities, otherwsie significantly  improved at rest. Very happy and eager for dc, but under impression that she is going home tomorrow. Patient currently needs min-mod assist level, and per Sw prior discussions with son, this was not available on dc as family works. However, patient states there is a family member, Saima Coffey 	  		    Physical Exam:    Reference Recent Physical Exam:  · In accordance with current standards limiting patient contact please refer to the recent:	Inpatient Physical Exam	    · Constitutional	detailed exam	  · Constitutional Details	no distress	  · Respiratory	detailed exam	  · Respiratory Details	normal; airway patent; breath sounds equal; good air movement; respirations non-labored; clear to auscultation bilaterally	  · Cardiovascular	detailed exam	  · Cardiovascular Details	regular rate and rhythm	  · Gastrointestinal	detailed exam	  · GI Normal	normal; soft; nontender; no distention; bowel sounds normal	  · Extremities	detailed exam	  · Extremities Comments	RLE knee and ankle wrapped in ace, ecchymosis/improved/resolved Patient is a 69y old  Female who presents with a chief complaint of left frontal parietal CVA with right HP and aphasia, COVID+ 4/8/20 fu (21 May 2020 12:46)      HPI:  SHIREEN CASH is a 70yo female RH dominant with PMH of HTN, HLD, T2DM, hx CVA, GERD, known RBBB presented to [facility] ED on 4/8/20 with complaints of fever, chills, cough, chest pain, SOB for 5 days. Per patient, her  tested +COVID at an outpatient testing facility a few days prior, patient herself had tested negative at the time. They continued to share the same living space. ED workup showed +COVID and Mobitz type II block. EP was consulted and patient is s/p Medtronic Micra AV+ leadless pacemaker implantation via right femoral vein on 4/10/20. Hydroxychloroquine presumably not given due to prolonged QTc.     On 4/16/20, patient developed acute aphasia and right sided weakness. CT head showed an acute LEFT frontal/parietal CVA. CTA head/neck showed LEFT ICA stenosis but no occlusion. tPA was not administered due to timing and no penumbra. Placed on full dose lovenox as thoguht to be hypercoagulable secondary to COVID. Patient was cleared for regular consistency solids, thin liquids. Transferred to [place] for acute inpatient rehab services 4/23/20. (23 Apr 2020 14:26)      PAST MEDICAL & SURGICAL HISTORY:  H/O gastroesophageal reflux (GERD)  HTN (hypertension)  DM (diabetes mellitus)  History of benign brain tumor      MEDICATIONS  (STANDING):  amLODIPine   Tablet 5 milliGRAM(s) Oral daily  ammonium lactate 12% Lotion 1 Application(s) Topical two times a day  aspirin enteric coated 81 milliGRAM(s) Oral daily  atorvastatin 10 milliGRAM(s) Oral at bedtime  dextrose 5%. 1000 milliLiter(s) (50 mL/Hr) IV Continuous <Continuous>  dextrose 50% Injectable 12.5 Gram(s) IV Push once  dextrose 50% Injectable 25 Gram(s) IV Push once  dextrose 50% Injectable 25 Gram(s) IV Push once  enoxaparin Injectable 40 milliGRAM(s) SubCutaneous daily  famotidine    Tablet 20 milliGRAM(s) Oral daily  FLUoxetine 20 milliGRAM(s) Oral daily  gabapentin 300 milliGRAM(s) Oral at bedtime  hydrALAZINE 50 milliGRAM(s) Oral daily  insulin glargine Injectable (LANTUS) 20 Unit(s) SubCutaneous at bedtime  insulin lispro (HumaLOG) corrective regimen sliding scale   SubCutaneous at bedtime  insulin lispro (HumaLOG) corrective regimen sliding scale   SubCutaneous before breakfast  lidocaine   Patch 1 Patch Transdermal daily  melatonin 3 milliGRAM(s) Oral at bedtime  metFORMIN 1000 milliGRAM(s) Oral two times a day with meals  metoprolol tartrate 25 milliGRAM(s) Oral every 12 hours  polyethylene glycol 3350 17 Gram(s) Oral daily  sodium chloride 0.65% Nasal 1 Spray(s) Both Nostrils four times a day  sodium chloride 0.9% Bolus 1000 milliLiter(s) IV Bolus once    MEDICATIONS  (PRN):  acetaminophen   Tablet .. 650 milliGRAM(s) Oral every 6 hours PRN Temp greater or equal to 38C (100.4F), Mild Pain (1 - 3)  benzonatate 100 milliGRAM(s) Oral every 8 hours PRN Cough  dextrose 40% Gel 15 Gram(s) Oral once PRN Blood Glucose LESS THAN 70 milliGRAM(s)/deciliter  glucagon  Injectable 1 milliGRAM(s) IntraMuscular once PRN Glucose LESS THAN 70 milligrams/deciliter  lactulose Syrup 10 Gram(s) Oral daily PRN constipation  saline laxative (FLEET) Rectal Enema 1 Enema Rectal once PRN if no BM by this evening 5/13/20      Allergies    No Known Allergies    Intolerances          VITALS  69y  Vital Signs Last 24 Hrs  T(C): 36.7 (21 May 2020 08:56), Max: 36.9 (20 May 2020 21:00)  T(F): 98 (21 May 2020 08:56), Max: 98.5 (20 May 2020 21:00)  HR: 64 (21 May 2020 08:56) (64 - 77)  BP: 113/61 (21 May 2020 08:56) (113/61 - 143/74)  BP(mean): --  RR: 15 (21 May 2020 08:56) (14 - 15)  SpO2: 96% (21 May 2020 08:56) (95% - 96%)  Daily     Daily         RECENT LABS:                          11.2   8.89  )-----------( 460      ( 21 May 2020 08:50 )             33.8     05-21    137  |  101  |  14  ----------------------------<  101<H>  4.2   |  25  |  0.83    Ca    9.3      21 May 2020 08:50    TPro  7.6  /  Alb  3.9  /  TBili  0.7  /  DBili  x   /  AST  19  /  ALT  16  /  AlkPhos  52  05-21    LIVER FUNCTIONS - ( 21 May 2020 08:50 )  Alb: 3.9 g/dL / Pro: 7.6 g/dL / ALK PHOS: 52 U/L / ALT: 16 U/L / AST: 19 U/L / GGT: x                   CAPILLARY BLOOD GLUCOSE      POCT Blood Glucose.: 79 mg/dL (21 May 2020 07:39)  POCT Blood Glucose.: 124 mg/dL (20 May 2020 22:24)          Review of Systems:   · Additional ROS	Patient seen in OT with assistance language line  at 10:25 AM. Patient was ambualting with RW, right ankle wrapped for support foot drop pending AFO fabrication as outpatient.  Mild pain with standing activities, otherwsie significantly  improved at rest. Very happy and eager for dc, but under impression that she is going home tomorrow. Patient currently needs min-mod assist level, and per Sw prior discussions with son, this was not available on dc as family works. However, patient states there is a family member, Saima Coffey 	  		    Physical Exam:    Reference Recent Physical Exam:  · In accordance with current standards limiting patient contact please refer to the recent:	Inpatient Physical Exam	    · Constitutional	detailed exam	  · Constitutional Details	no distress	  · Respiratory	detailed exam	  · Respiratory Details	normal; airway patent; breath sounds equal; good air movement; respirations non-labored; clear to auscultation bilaterally no coughing or respiratory distress	  · Cardiovascular	detailed exam	  · Cardiovascular Details	regular rate and rhythm	  · Gastrointestinal	detailed exam	  · GI Normal	normal; soft; nontender; no distention; bowel sounds normal	  · Extremities	detailed exam	  · Extremities Comments	RLE knee and ankle wrapped in ace, ecchymosis/improved/resolved no TTP lateral lower leg or medial no ankle TTP +mild TTP anterior leg

## 2020-05-21 NOTE — CHART NOTE - NSCHARTNOTEFT_GEN_A_CORE
Nutrition Follow Up Note  Hospital Course   (Per Electronic Medical Record):   Patient Speaks [place] citizen, RD is Fluent in [place] citizen    Source:  Patient [X]  Medical Record [X]      Diet:   Consistent Carbohydrate DASH-TLC Diet w/ Thin Liquids  Tolerates Diet Well  No Chewing/Swallowing Difficulties  No Recent Nausea, Vomiting, Diarrhea or Constipation  Consumes % of Meals (as Per Documentation)  Glucerna 8oz PO BID (Provides 440kcal-20grams of Protein) - Declines Nutrition Supplementation   Recommend Discontinue Glucerna 8oz PO BID  Recommend Change Diet to Consistent Carbohydrate Low Sodium Diet   Education Provided on Proper Nutrition  Obtained Food Preferences from Patient     Enteral/Parenteral Nutrition: Not Applicable    Current Weight: 169.5lb on 5/10  Weights Currently Stable @This Time  Obtain Weights Weekly     Pertinent Medications: MEDICATIONS  (STANDING):  amLODIPine   Tablet 5 milliGRAM(s) Oral daily  ammonium lactate 12% Lotion 1 Application(s) Topical two times a day  aspirin enteric coated 81 milliGRAM(s) Oral daily  atorvastatin 10 milliGRAM(s) Oral at bedtime  dextrose 5%. 1000 milliLiter(s) (50 mL/Hr) IV Continuous <Continuous>  dextrose 50% Injectable 12.5 Gram(s) IV Push once  dextrose 50% Injectable 25 Gram(s) IV Push once  dextrose 50% Injectable 25 Gram(s) IV Push once  enoxaparin Injectable 40 milliGRAM(s) SubCutaneous daily  famotidine    Tablet 20 milliGRAM(s) Oral daily  FLUoxetine 20 milliGRAM(s) Oral daily  gabapentin 300 milliGRAM(s) Oral at bedtime  hydrALAZINE 50 milliGRAM(s) Oral daily  insulin glargine Injectable (LANTUS) 20 Unit(s) SubCutaneous at bedtime  insulin lispro (HumaLOG) corrective regimen sliding scale   SubCutaneous at bedtime  insulin lispro (HumaLOG) corrective regimen sliding scale   SubCutaneous before breakfast  lidocaine   Patch 1 Patch Transdermal daily  melatonin 3 milliGRAM(s) Oral at bedtime  metFORMIN 1000 milliGRAM(s) Oral two times a day with meals  metoprolol tartrate 25 milliGRAM(s) Oral every 12 hours  polyethylene glycol 3350 17 Gram(s) Oral daily  sodium chloride 0.65% Nasal 1 Spray(s) Both Nostrils four times a day  sodium chloride 0.9% Bolus 1000 milliLiter(s) IV Bolus once    MEDICATIONS  (PRN):  acetaminophen   Tablet .. 650 milliGRAM(s) Oral every 6 hours PRN Temp greater or equal to 38C (100.4F), Mild Pain (1 - 3)  benzonatate 100 milliGRAM(s) Oral every 8 hours PRN Cough  dextrose 40% Gel 15 Gram(s) Oral once PRN Blood Glucose LESS THAN 70 milliGRAM(s)/deciliter  glucagon  Injectable 1 milliGRAM(s) IntraMuscular once PRN Glucose LESS THAN 70 milligrams/deciliter  lactulose Syrup 10 Gram(s) Oral daily PRN constipation  saline laxative (FLEET) Rectal Enema 1 Enema Rectal once PRN if no BM by this evening 5/13/20    Pertinent Labs:  05-21 Na137 mmol/L Glu 101 mg/dL<H> K+ 4.2 mmol/L Cr  0.83 mg/dL BUN 14 mg/dL 05-21 Alb 3.9 g/dL    POCT (over Last 2 Days) - Ranging from      Skin: No Pressure Ulcers     Edema: None Noted     Last Bowel Movement: on 5/20    Estimated Needs:   [X] No Change Since Previous Assessment    Previous Nutrition Diagnosis:   Inadequate Oral Intake  Altered Nutrition Related Lab    Nutrition Diagnosis is [X] Ongoing - Altered Nutrition Related Lab & Inadequate Oral Intake - Obtained Food Preferences from Patient     New Nutrition Diagnosis: [X] Not Applicable    Interventions:   1. Recommend Discontinue Glucerna 8oz PO BID   2. Education Provided on Proper Nutrition  3. Recommend Continue Nutrition Plan of Care     Monitoring & Evaluation:   [X] Weights   [X] PO Intake   [X] Skin Integrity   [X] Follow Up (Per Protocol)  [X] Tolerance to Diet Prescription   [X] Other: Labs & POCT    Registered Dietitian/Nutritionist Remains Available.  Oli Richards RDN    Pager # [phone]  Phone# [phone]

## 2020-05-21 NOTE — PROGRESS NOTE ADULT - SUBJECTIVE AND OBJECTIVE BOX
no co     mild r calf pain resolved     no chest pain no sob   seen and examined at bedside       Vital Signs Last 24 Hrs    T(C): 36.7 (21 May 2020 08:56), Max: 36.9 (20 May 2020 21:00)  T(F): 98 (21 May 2020 08:56), Max: 98.5 (20 May 2020 21:00)  HR: 64 (21 May 2020 08:56) (64 - 77)  BP: 113/61 (21 May 2020 08:56) (113/61 - 143/74)  BP(mean): --  RR: 15 (21 May 2020 08:56) (14 - 15)  SpO2: 96% (21 May 2020 08:56) (95% - 96%)    CBC Full  -  ( 21 May 2020 08:50 )  WBC Count : 8.89 K/uL  RBC Count : 3.86 M/uL  Hemoglobin : 11.2 g/dL  Hematocrit : 33.8 %  Platelet Count - Automated : 460 K/uL  Mean Cell Volume : 87.6 fl  Mean Cell Hemoglobin : 29.0 pg  Mean Cell Hemoglobin Concentration : 33.1 gm/dL  Auto Neutrophil # : 5.31 K/uL  Auto Lymphocyte # : 2.51 K/uL  Auto Monocyte # : 0.74 K/uL  Auto Eosinophil # : 0.20 K/uL  Auto Basophil # : 0.06 K/uL  Auto Neutrophil % : 59.8 %  Auto Lymphocyte % : 28.2 %  Auto Monocyte % : 8.3 %  Auto Eosinophil % : 2.2 %  Auto Basophil % : 0.7 %    05-21    137  |  101  |  14  ----------------------------<  101<H>  4.2   |  25  |  0.83    Ca    9.3      21 May 2020 08:50    TPro  7.6  /  Alb  3.9  /  TBili  0.7  /  DBili  x   /  AST  19  /  ALT  16  /  AlkPhos  52  05-21    MEDICATIONS  (STANDING):  amLODIPine   Tablet 5 milliGRAM(s) Oral daily  ammonium lactate 12% Lotion 1 Application(s) Topical two times a day  aspirin enteric coated 81 milliGRAM(s) Oral daily  atorvastatin 10 milliGRAM(s) Oral at bedtime  dextrose 5%. 1000 milliLiter(s) (50 mL/Hr) IV Continuous <Continuous>  dextrose 50% Injectable 12.5 Gram(s) IV Push once  dextrose 50% Injectable 25 Gram(s) IV Push once  dextrose 50% Injectable 25 Gram(s) IV Push once  enoxaparin Injectable 40 milliGRAM(s) SubCutaneous daily  famotidine    Tablet 20 milliGRAM(s) Oral daily  FLUoxetine 20 milliGRAM(s) Oral daily  gabapentin 300 milliGRAM(s) Oral at bedtime  hydrALAZINE 50 milliGRAM(s) Oral daily  insulin glargine Injectable (LANTUS) 20 Unit(s) SubCutaneous at bedtime  insulin lispro (HumaLOG) corrective regimen sliding scale   SubCutaneous at bedtime  insulin lispro (HumaLOG) corrective regimen sliding scale   SubCutaneous before breakfast  lidocaine   Patch 1 Patch Transdermal daily  melatonin 3 milliGRAM(s) Oral at bedtime  metFORMIN 1000 milliGRAM(s) Oral two times a day with meals  metoprolol tartrate 25 milliGRAM(s) Oral every 12 hours  polyethylene glycol 3350 17 Gram(s) Oral daily  sodium chloride 0.65% Nasal 1 Spray(s) Both Nostrils four times a day  sodium chloride 0.9% Bolus 1000 milliLiter(s) IV Bolus once                          11.2   8.89  )-----------( 460      ( 21 May 2020 08:50 )             33.8

## 2020-05-22 PROCEDURE — 99233 SBSQ HOSP IP/OBS HIGH 50: CPT

## 2020-05-22 PROCEDURE — 99232 SBSQ HOSP IP/OBS MODERATE 35: CPT

## 2020-05-22 PROCEDURE — 70450 CT HEAD/BRAIN W/O DYE: CPT | Mod: 26

## 2020-05-22 RX ORDER — AMLODIPINE BESYLATE 2.5 MG/1
1 TABLET ORAL
Qty: 30 | Refills: 0
Start: 2020-05-22 | End: 2020-06-20

## 2020-05-22 RX ORDER — LANOLIN ALCOHOL/MO/W.PET/CERES
1 CREAM (GRAM) TOPICAL
Qty: 0 | Refills: 0 | DISCHARGE
Start: 2020-05-22

## 2020-05-22 RX ORDER — GABAPENTIN 400 MG/1
1 CAPSULE ORAL
Qty: 30 | Refills: 0
Start: 2020-05-22 | End: 2020-06-20

## 2020-05-22 RX ORDER — HYDRALAZINE HCL 50 MG
1 TABLET ORAL
Qty: 30 | Refills: 0
Start: 2020-05-22 | End: 2020-06-20

## 2020-05-22 RX ORDER — POLYETHYLENE GLYCOL 3350 17 G/17G
17 POWDER, FOR SOLUTION ORAL
Qty: 0 | Refills: 0 | DISCHARGE
Start: 2020-05-22

## 2020-05-22 RX ORDER — SENNA PLUS 8.6 MG/1
2 TABLET ORAL AT BEDTIME
Refills: 0 | Status: DISCONTINUED | OUTPATIENT
Start: 2020-05-22 | End: 2020-05-29

## 2020-05-22 RX ORDER — ATORVASTATIN CALCIUM 80 MG/1
1 TABLET, FILM COATED ORAL
Qty: 30 | Refills: 0
Start: 2020-05-22 | End: 2020-06-20

## 2020-05-22 RX ORDER — METOPROLOL TARTRATE 50 MG
1 TABLET ORAL
Qty: 60 | Refills: 0
Start: 2020-05-22 | End: 2020-06-20

## 2020-05-22 RX ORDER — SODIUM CHLORIDE 0.65 %
0 AEROSOL, SPRAY (ML) NASAL
Qty: 0 | Refills: 0 | DISCHARGE
Start: 2020-05-22

## 2020-05-22 RX ORDER — ACETAMINOPHEN 500 MG
2 TABLET ORAL
Qty: 0 | Refills: 0 | DISCHARGE
Start: 2020-05-22

## 2020-05-22 RX ORDER — FLUOXETINE HCL 10 MG
1 CAPSULE ORAL
Qty: 30 | Refills: 0
Start: 2020-05-22 | End: 2020-06-20

## 2020-05-22 RX ORDER — FAMOTIDINE 10 MG/ML
1 INJECTION INTRAVENOUS
Qty: 0 | Refills: 0 | DISCHARGE
Start: 2020-05-22

## 2020-05-22 RX ORDER — ASPIRIN/CALCIUM CARB/MAGNESIUM 324 MG
1 TABLET ORAL
Qty: 0 | Refills: 0 | DISCHARGE
Start: 2020-05-22

## 2020-05-22 RX ORDER — LACTULOSE 10 G/15ML
15 SOLUTION ORAL
Qty: 0 | Refills: 0 | DISCHARGE
Start: 2020-05-22

## 2020-05-22 RX ADMIN — ENOXAPARIN SODIUM 40 MILLIGRAM(S): 100 INJECTION SUBCUTANEOUS at 12:00

## 2020-05-22 RX ADMIN — SENNA PLUS 2 TABLET(S): 8.6 TABLET ORAL at 21:46

## 2020-05-22 RX ADMIN — LIDOCAINE 1 PATCH: 4 CREAM TOPICAL at 12:01

## 2020-05-22 RX ADMIN — Medication 650 MILLIGRAM(S): at 16:46

## 2020-05-22 RX ADMIN — Medication 1 SPRAY(S): at 00:31

## 2020-05-22 RX ADMIN — Medication 1 APPLICATION(S): at 17:05

## 2020-05-22 RX ADMIN — Medication 1 SPRAY(S): at 06:46

## 2020-05-22 RX ADMIN — FAMOTIDINE 20 MILLIGRAM(S): 10 INJECTION INTRAVENOUS at 12:00

## 2020-05-22 RX ADMIN — METFORMIN HYDROCHLORIDE 1000 MILLIGRAM(S): 850 TABLET ORAL at 07:40

## 2020-05-22 RX ADMIN — LIDOCAINE 1 PATCH: 4 CREAM TOPICAL at 18:25

## 2020-05-22 RX ADMIN — Medication 25 MILLIGRAM(S): at 06:47

## 2020-05-22 RX ADMIN — Medication 1 SPRAY(S): at 12:07

## 2020-05-22 RX ADMIN — Medication 50 MILLIGRAM(S): at 06:46

## 2020-05-22 RX ADMIN — Medication 3 MILLIGRAM(S): at 21:44

## 2020-05-22 RX ADMIN — Medication 1 SPRAY(S): at 17:05

## 2020-05-22 RX ADMIN — INSULIN GLARGINE 20 UNIT(S): 100 INJECTION, SOLUTION SUBCUTANEOUS at 21:44

## 2020-05-22 RX ADMIN — Medication 20 MILLIGRAM(S): at 12:00

## 2020-05-22 RX ADMIN — METFORMIN HYDROCHLORIDE 1000 MILLIGRAM(S): 850 TABLET ORAL at 18:06

## 2020-05-22 RX ADMIN — Medication 100 MILLIGRAM(S): at 07:40

## 2020-05-22 RX ADMIN — ATORVASTATIN CALCIUM 10 MILLIGRAM(S): 80 TABLET, FILM COATED ORAL at 21:43

## 2020-05-22 RX ADMIN — Medication 25 MILLIGRAM(S): at 17:05

## 2020-05-22 RX ADMIN — AMLODIPINE BESYLATE 5 MILLIGRAM(S): 2.5 TABLET ORAL at 06:45

## 2020-05-22 RX ADMIN — GABAPENTIN 300 MILLIGRAM(S): 400 CAPSULE ORAL at 21:44

## 2020-05-22 RX ADMIN — Medication 81 MILLIGRAM(S): at 12:00

## 2020-05-22 RX ADMIN — Medication 1 APPLICATION(S): at 06:46

## 2020-05-22 NOTE — PROGRESS NOTE ADULT - ASSESSMENT
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 stable 2.5 cm L arachnoid cyst.  HEAD CT 5/20: Small left frontal subarachnoid hemorrhage is decreased. Multiple chronic infarcts.  - Was on ASA, lovenox therapeutic dose as may be hypercoagulable due to COVID. Neuro f/u 5/20 appreciated, cleared to resume ASA 81 mg daily, Restarted PPX lovenox 5/20 after stable Head CT  -repeat Head CT next Wed 5/27 (1 week) - to discuss with neuro - repeat today friday.  - cont prozac for motor recovery. Increase to 20 mg daily for mood d/o 5/13, tolerating, improved  -neuropsychology supportive counseling  -right SAFO as outpatient. Continue ACe wrapping for foot drop for now    # r/o coagulopathy due to spontaneous SAH and RLE hematoma:  -Heme/onc consulted for ?hypo-coagulopathy:. 5/18 consult read and appreciated:  ·	Coagulopathy: agreed with holding anticoagulation, stroke likely secondary to COVID 19+  ·	Bleeding diathesis: Will check factor V,  VII, VIII, IX, XII, XIII. fibrinogen, coags, VWD.   ·	 Head CT improved, no new areas bleed, and with multiple infarcts, as recommended by hospitalist. Vit K held as agreed by heme and hospitalist       #Chest pain 5/18 - resolved  -EKG NSR, troponin negative, DDimer downtrending  -appears musculoskeletal & 2/2 to positioning  -encourage OOB, tylenol prn pain    #RLE pain: nearly resolved  - Doppler negative DVT 4/28, 5/7  - 5/8 CT RLE with finding of hematoma, vascular consulted would monitor for now, can continue dvt ppx given risk of COVID - appears improved on exam   -5/8 LE ANGEL performed - with +moderate PAD  -Xray right knee 4/30 negative for acute fracture  Xray right ankle 5/12 negative for fracture +calcaneal spur    #Acute Hypoxic Respiratory Failure secondary to COVID-19 PNA 4/8  - Hydroxychloroquine not given, pt with prolonged QTc  - EKG - QTc: 500 (4/14), incomplete RBBB  - Incentive spirometry, robitussin DM, ocean spray  -patient tolerating therapies without O2  -COVID reswab NEGATIVE 5/13    #UTI  -completed macrobid 5/15  -urine cultures - +E coli >100k macrobid sensitive  -mild leukocytosis 11.72 5/14. Afebrile, symptoms improved  CBC 5/18 - 10.27 stable --> 8.89 5/21  CBC 5/24    #BRADY 2/2 to dehydration vs UTI - resolved  - encourage po fluids . BUn /Cr 14/0.83 5/21  BMp 5/24    #HTN  - Lopressor 25 q12, amlodipine 5 qd, hydralazine 50mg qd  controlled 5/22    #type II Mobitz block, known RBBB  - s/p Medtronic Micra AV+ leadless pacemaker  - outpt EP followup    #T2DM.  - SSI ACHS  -diabetic educator consult appreciated  continue metformin 1000 mg BID  -lantus 20 units qhs  controlled 5/21. FS reduced to BID    #HLD  - cont simvastatin 20 qhs    #insomnia  - melatonin 3    #Pain Mgmt   - Tylenol PRN    #GI/Bowel Mgmt   - Continent  - pepcid    #/Bladder Mgmt   - Continent, PVR negative    #FEN   - Diet - Regular + Thins  [[facility]]  DASH/TLC      #skin:  abrasion back. bacitracin and DSD daily 4/29    #- DVT PPX  - on hold for bleed.     #Case discussed in IDT rounds 5/20:  -patient requires min assist toileting, CG/min assist transfers, ambulation  feet RW, total assist stairs,  -min assist bADLs, supervision, improved expression and word finding, min assist shower transfers, CG ambulation  -target dc /27. Staff recommending dc home with caregiver support; will be at lesser (min assist level) but family not present for many hours during day.Will re-present to family to see if other arrangements can be made  per patient, has family member Saima Arguetta who can assist at duration recommended by staff. Will need to arrange in-person training, likely 2-3 sessions estimated. SW and patient informed. 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 stable 2.5 cm L arachnoid cyst.  HEAD CT 5/20: Small left frontal subarachnoid hemorrhage is decreased. Multiple chronic infarcts.  -repeat Head CT TODAY 5/22 after discussion with neuro given change in dc plan  - Was on ASA, lovenox therapeutic dose as may be hypercoagulable due to COVID. Neuro f/u 5/20 appreciated, cleared to resume ASA 81 mg daily, Restarted PPX lovenox 5/20 after stable Head CT. f/u after Head CT 5/22  - cont prozac for motor recovery. Increase to 20 mg daily for mood d/o 5/13, tolerating, improved  -neuropsychology supportive counseling  -right SAFO as outpatient. Continue ACe wrapping for foot drop for now    # r/o coagulopathy due to spontaneous SAH and RLE hematoma:  -Heme/onc consulted for ?hypo-coagulopathy:. 5/18 consult read and appreciated:  ·	Coagulopathy: agreed with holding anticoagulation, stroke likely secondary to COVID 19+  ·	Bleeding diathesis: Will check factor V,  VII, VIII, IX, XII, XIII. fibrinogen, coags, VWD.   ·	 Head CT improved, no new areas bleed, and with multiple infarcts, as recommended by hospitalist. Vit K held as agreed by heme and hospitalist     #Chest pain 5/18 - resolved  -EKG NSR, troponin negative, DDimer downtrending  -appears musculoskeletal & 2/2 to positioning  -encourage OOB, tylenol prn pain    #RLE pain: resolved  - Doppler negative DVT 4/28, 5/7  - 5/8 CT RLE with finding of hematoma, vascular consulted would monitor for now, can continue dvt ppx given risk of COVID - appears improved on exam   -5/8 LE ANGEL performed - with +moderate PAD  -Xray right knee 4/30 negative for acute fracture  Xray right ankle 5/12 negative for fracture +calcaneal spur  -right SAFO with liner     #Acute Hypoxic Respiratory Failure secondary to COVID-19 PNA 4/8  - Hydroxychloroquine not given, pt with prolonged QTc  - EKG - QTc: 500 (4/14), incomplete RBBB  - Incentive spirometry, robitussin DM, ocean spray  -patient tolerating therapies without O2  -COVID reswab NEGATIVE 5/13    #UTI  -completed macrobid 5/15  -urine cultures - +E coli >100k macrobid sensitive  -mild leukocytosis 11.72 5/14. Afebrile, symptoms improved  CBC 5/18 - 10.27 stable --> 8.89 5/21  CBC 5/25    #BRADY 2/2 to dehydration vs UTI - resolved  - encourage po fluids . BUn /Cr 14/0.83 5/21  BMp 5/25    #HTN  - Lopressor 25 q12, amlodipine 5 qd, hydralazine 50mg qd  controlled 5/22    #type II Mobitz block, known RBBB  - s/p Medtronic Micra AV+ leadless pacemaker  - outpt EP followup    #T2DM.  - SSI ACHS  -diabetic educator consult appreciated  continue metformin 1000 mg BID  -lantus 20 units qhs  controlled 5/21. FS reduced to BID    #HLD  - cont simvastatin 20 qhs    #insomnia  - melatonin 3    #Pain Mgmt   - Tylenol PRN    #GI/Bowel Mgmt   - Continent  - pepcid    #/Bladder Mgmt   - Continent, PVR negative    #FEN   - Diet - Regular + Thins  [[facility]]  DASH/TLC      #skin:  abrasion back. bacitracin and DSD daily 4/29    #- DVT PPX  - on hold for bleed.     #Case discussed in IDT rounds 5/20:  -patient requires min assist toileting, CG/min assist transfers, ambulation  feet RW, total assist stairs,  -min assist bADLs, supervision, improved expression and word finding, min assist shower transfers, CG ambulation  -target dc /27. Staff recommending dc home with caregiver support; will be at lesser (min assist level) but family not present for many hours during day.Will re-present to family to see if other arrangements can be made  per patient, has family member Saima Coffey who can assist at duration recommended by staff. Will need to arrange in-person training, likely 2-3 sessions estimated. SW and patient informed. continue to provide supportive counseling as needed as patient expresses frustration with duration of hospitalization    #LABS  CBC BMP 5/25  head CT today 5/22  caregiver training

## 2020-05-22 NOTE — PROGRESS NOTE ADULT - ASSESSMENT
Patient is a 69 year old woman admitted 4/23/20 to [facility].  She had been hospitalized on 4/8/20 with Covid 19 (+). She was found to have a Mobitz type II block and a pacemaker was placed. On 4/16 she was found aphasic with right-sided hemiparesis. A CT head noted a left frontoparietal infarct. A CTA Head and Neck noted Left ICA stenosis without occlusion. She was placed on ASA.  She was also  placed on full dose of lovenox with consideration of hypercoagulable state and COVID -19 (+). Today noted extensive ecchymosis involving the right posterior calf and patient complains of pain involving the same area. She notes mild HA generalized . She denies any change with difficulty with speech. She states no change with right arm weakness and inability to move right leg. She denies other neurological complaints. She denies other complaints. Neurological exam as above. Today, Saturday,5/9, notes less calf pain. She continues with mild HA and no change with neurological complaints. Today, Saturday, 5/16, she denies any HA. She notes no change with neurological complaints.  Today, Wednesday, 5/20, she denies HA. She states slight increased strength RUE.    1) CT head 5/9 as  new mild SAH left frontal. Evolving subacute left MCA infarcts, chronic right inf cerebellar hemispheric infarct, small arachnoid cyst adjacent to left sylvian fissure.  2) CT Head 5/10  mild SAH left frontal unchanged from CT Head 5/9.  No new hemorrhage. Stable chronic Left MCA infarcts. Stable chronic right inf cerebellar infarct. Stable chronic small subarachnoid cyst.  3) Agree with ASA 81 mg daily discontinued and Lovenox discontinued.  4) CT head 5/12  again noted low level blood left anterior paracentral region, no evidence of new hemorrhage. Stable follow-up with no significant change. Ischemic changes with now mature appearance of Left MCA and OLU infarcts seen 4/16/20. follow-up MRI imaging recommended for further assessment.  5)  CT head 5/15,  chronic infarction left frontal and parietal lobes with unchanged focus of subarachnoid hemorrhage in the left frontal infarction. Minimal subarachnoid hemorrhage within the right lateral perimesencephalic cistern. Old infarct again noted right cerebellum. Stable arachnoid cyst left middle cranial fossa. Discussed with the radiologist her reading of the minimal SAH on the right on this study. She advises that not seen on the prior CT Head 5/12/20.  6)  CT Head 5/20/20, small left frontal SAH is decreased. Encephalomalacia and gliosis in the left frontal and parietal areas and right cerebellum suggesting chronic infarcts. Arachnoid cyst suggested left frontotemporal. Chronic white matter ischemic change.  7) CT Head 5/22 as above unchanged hyperdensity left frontal lobe suggesting hemorrhage.  8) Continue ASA 81 mg daily.

## 2020-05-22 NOTE — PROGRESS NOTE ADULT - SUBJECTIVE AND OBJECTIVE BOX
sitting up in wheelchair, next to her bed     comfortable this am     mild r calf pain, improving     ROS all others are neg     Vital Signs Last 24 Hrs    T(C): 36.9 (22 May 2020 08:38), Max: 36.9 (22 May 2020 08:38)  T(F): 98.5 (22 May 2020 08:38), Max: 98.5 (22 May 2020 08:38)  HR: 68 (22 May 2020 08:38) (68 - 76)  BP: 146/71 (22 May 2020 08:38) (133/72 - 146/71)  BP(mean): --  RR: 15 (22 May 2020 08:38) (14 - 15)  SpO2: 98% (22 May 2020 08:38) (96% - 98%)    CBC Full  -  ( 21 May 2020 08:50 )  WBC Count : 8.89 K/uL  RBC Count : 3.86 M/uL  Hemoglobin : 11.2 g/dL  Hematocrit : 33.8 %  Platelet Count - Automated : 460 K/uL  Mean Cell Volume : 87.6 fl  Mean Cell Hemoglobin : 29.0 pg  Mean Cell Hemoglobin Concentration : 33.1 gm/dL  Auto Neutrophil # : 5.31 K/uL  Auto Lymphocyte # : 2.51 K/uL  Auto Monocyte # : 0.74 K/uL  Auto Eosinophil # : 0.20 K/uL  Auto Basophil # : 0.06 K/uL  Auto Neutrophil % : 59.8 %  Auto Lymphocyte % : 28.2 %  Auto Monocyte % : 8.3 %  Auto Eosinophil % : 2.2 %  Auto Basophil % : 0.7 %    05-21    137  |  101  |  14  ----------------------------<  101<H>  4.2   |  25  |  0.83    Ca    9.3      21 May 2020 08:50    TPro  7.6  /  Alb  3.9  /  TBili  0.7  /  DBili  x   /  AST  19  /  ALT  16  /  AlkPhos  52  05-21    MEDICATIONS  (STANDING):    amLODIPine   Tablet 5 milliGRAM(s) Oral daily  ammonium lactate 12% Lotion 1 Application(s) Topical two times a day  aspirin enteric coated 81 milliGRAM(s) Oral daily  atorvastatin 10 milliGRAM(s) Oral at bedtime  dextrose 5%. 1000 milliLiter(s) (50 mL/Hr) IV Continuous <Continuous>  dextrose 50% Injectable 12.5 Gram(s) IV Push once  dextrose 50% Injectable 25 Gram(s) IV Push once  dextrose 50% Injectable 25 Gram(s) IV Push once  enoxaparin Injectable 40 milliGRAM(s) SubCutaneous daily  famotidine    Tablet 20 milliGRAM(s) Oral daily  FLUoxetine 20 milliGRAM(s) Oral daily  gabapentin 300 milliGRAM(s) Oral at bedtime  hydrALAZINE 50 milliGRAM(s) Oral daily  insulin glargine Injectable (LANTUS) 20 Unit(s) SubCutaneous at bedtime  insulin lispro (HumaLOG) corrective regimen sliding scale   SubCutaneous at bedtime  insulin lispro (HumaLOG) corrective regimen sliding scale   SubCutaneous before breakfast  lidocaine   Patch 1 Patch Transdermal daily  melatonin 3 milliGRAM(s) Oral at bedtime  metFORMIN 1000 milliGRAM(s) Oral two times a day with meals  metoprolol tartrate 25 milliGRAM(s) Oral every 12 hours  polyethylene glycol 3350 17 Gram(s) Oral daily  senna 2 Tablet(s) Oral at bedtime  sodium chloride 0.65% Nasal 1 Spray(s) Both Nostrils four times a day      MEDICATIONS  (STANDING):  amLODIPine   Tablet 5 milliGRAM(s) Oral daily  ammonium lactate 12% Lotion 1 Application(s) Topical two times a day  aspirin enteric coated 81 milliGRAM(s) Oral daily  atorvastatin 10 milliGRAM(s) Oral at bedtime  dextrose 5%. 1000 milliLiter(s) (50 mL/Hr) IV Continuous <Continuous>  dextrose 50% Injectable 12.5 Gram(s) IV Push once  dextrose 50% Injectable 25 Gram(s) IV Push once  dextrose 50% Injectable 25 Gram(s) IV Push once  enoxaparin Injectable 40 milliGRAM(s) SubCutaneous daily  famotidine    Tablet 20 milliGRAM(s) Oral daily  FLUoxetine 20 milliGRAM(s) Oral daily  gabapentin 300 milliGRAM(s) Oral at bedtime  hydrALAZINE 50 milliGRAM(s) Oral daily  insulin glargine Injectable (LANTUS) 20 Unit(s) SubCutaneous at bedtime  insulin lispro (HumaLOG) corrective regimen sliding scale   SubCutaneous at bedtime  insulin lispro (HumaLOG) corrective regimen sliding scale   SubCutaneous before breakfast  lidocaine   Patch 1 Patch Transdermal daily  melatonin 3 milliGRAM(s) Oral at bedtime  metFORMIN 1000 milliGRAM(s) Oral two times a day with meals  metoprolol tartrate 25 milliGRAM(s) Oral every 12 hours  polyethylene glycol 3350 17 Gram(s) Oral daily  senna 2 Tablet(s) Oral at bedtime  sodium chloride 0.65% Nasal 1 Spray(s) Both Nostrils four times a day    MEDICATIONS  (PRN):  acetaminophen   Tablet .. 650 milliGRAM(s) Oral every 6 hours PRN Temp greater or equal to 38C (100.4F), Mild Pain (1 - 3)  benzonatate 100 milliGRAM(s) Oral every 8 hours PRN Cough  dextrose 40% Gel 15 Gram(s) Oral once PRN Blood Glucose LESS THAN 70 milliGRAM(s)/deciliter  glucagon  Injectable 1 milliGRAM(s) IntraMuscular once PRN Glucose LESS THAN 70 milligrams/deciliter  lactulose Syrup 10 Gram(s) Oral daily PRN constipation  saline laxative (FLEET) Rectal Enema 1 Enema Rectal once PRN if no BM by this evening 5/13/20

## 2020-05-22 NOTE — PROGRESS NOTE ADULT - ASSESSMENT
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     Left frontal parietal CVA and SAH, repeat CT head 5-20 improving for interval evaluation   neuro and onc teams assist as outlined   holding antiplatelet, lovenox dose   fall risk precautions     Chest pain resolved  most likely non-cardiac   CAD risk reduction     COVID-19 acute hypoxic respiratory failure resolved   - saturating 99 % on RA now   - respiratory status stable    Essential Hypertension  - BP controlled  - hydralazine, metoprolol, amlodipine     Mobitz type II AV block   - s/p PPM  follow up in cardiology clinic     RLE hematoma resorbing  - pain controlled and improving  - vascular sx: not likely compartment syndrome  - h/h stable  - off lovenox given above  - hh stable     Hyponatremia stable   monitor na/time     DM II  - FS controlled, inpatient target 150's   - metformin 1000mg q12, Lantus and ISS    Leukocytosis resolved   - mild without signs of infection  - continue to monitor    Normocytic Anemia stable   - likely due to blood loss given hematoma    Full Code     VTE proph     Reviewed w patient and rehab team plan of care

## 2020-05-22 NOTE — PROGRESS NOTE ADULT - SUBJECTIVE AND OBJECTIVE BOX
Patient is a 69 year old woman admitted 4/23/20 to [facility].  She had been hospitalized on 4/8/20 with Covid 19 (+). She was found to have a Mobitz type II block and a pacemaker was placed. On 4/16 she was found aphasic with right-sided hemiparesis. A CT head noted a left frontoparietal infarct. A CTA Head and Neck noted Left ICA stenosis without occlusion. She was placed on ASA.  She was also  placed on full dose of lovenox with consideration of hypercoagulable state and COVID -19 (+). Today noted extensive ecchymosis involving the right posterior calf and patient complains of pain involving the same area. She notes mild HA generalized . She denies any change with difficulty with speech. She states no change with right arm weakness and inability to move right leg. She denies other neurological complaints. She denies other complaints. Today, Saturday, 5/9, the patient states the right calf pain is less. She continues with mild HA and no change with neurological complaints. Today,Saturday,  5/16, she denies HA. She denies any change with her neurological complaints. Today, Friday, 5/22, she denies HA. She states slight increased strength RUE.    PMH: As above.          HTN          HLD         DM             SH: No allergy    Exam: Awake, alert, speaks to this physician in Pashto           Speech- decreased fluency, responds with 1-2 and occasionally 3 words in response to questions and searches for words, follows commands promptly, no dysarthria           Pupils 2.5mm, reactive, RHH              Motor tone and strength-RUE-4+/5      LUE 5/5                                                 RLE-0/5,      LLE 5/5        D-Dimer Assay, Quantitative: 336 ng/mL DDU (05.18.20 @ 09:45)        COVID-19 PCR . (05.13.20 @ 16:00)    COVID-19 PCR: NotDetec: This test has been validated by [employer] to be accurate;  though it has not been FDA cleared/approved by the usual pathway.  As with all laboratory tests, results should be correlated with clinical  findings.  https://www.fda.gov/media/607474/download  https://www.fda.gov/media/091960/download          < from: CT Head No Cont (05.22.20 @ 15:53) >    COMPARISON: Prior CT dated 5/20/2020.    FINDINGS:    Unchanged hyperdensity in the left frontal lobe suggesting hemorrhage. Chronic infarcts in the left frontal and parietal lobes and right cerebellum. Arachnoid cyst along the left frontal temporal convexity. No significant midline shift, hydrocephalus, or effacement of basal cisterns. There are areas of hypodensity in the bilateral hemispheric white matter suggesting chronic white matter microvascular ischemic change. The ventricles and sulci are normal in size for patient's age.     There is no displaced calvarial fracture. The visualized orbits are within normal limits. The visualized portions of the paranasal sinuses are well aerated. The mastoid air cells are well aerated.      IMPRESSION: Unchanged hyperdensity in the left frontal lobe suggesting hemorrhage.              < from: CT Head No Cont (05.20.20 @ 13:23) >    COMPARISON: Prior CT dated 5/15/2020.    FINDINGS:    Small left frontal subarachnoid hemorrhage is decreased. Encephalomalacia and gliosis in the left frontal and left parietal lobes and right cerebellum suggesting chronic infarcts. Fluid attenuation extra-axial lesion along the left frontotemporal convexity suggesting an arachnoid cyst. No midline shift, hydrocephalus, or effacement of basal cisterns. There are areas of hypodensity in the bilateral hemispheric white matter suggesting chronic white matter microvascular ischemic change. There is cerebral volume loss.    There is no displaced calvarial fracture. The visualized orbits are within normal limits. The visualized portions of the paranasal sinuses are well aerated. The mastoid air cells are well aerated.      IMPRESSION: Small left frontal subarachnoid hemorrhage is decreased. Multiple chronic infarcts.      < end of copied text >          < from: CT Head No Cont (05.15.20 @ 09:33) >    FINDINGS:   05/12/2020 available for review.    The brain demonstrates chronic infarctions in the LEFT frontal and parietal lobes with unchanged focus of subarachnoid hemorrhage in the LEFT frontal infarction. Minimal subarachnoid hemorrhage also seen within the RIGHT lateral perimesencephalic cistern. Old infarction again noted in the RIGHT cerebellum. Stable 2.5 cm arachnoid cyst in the anterior LEFT middle cranial fossa.     No mass effect is found in the brain.      The ventricles, sulci and basal cisterns appear unremarkable.    The orbits are unremarkable.  The paranasal sinuses are clear.  The nasal cavity appears intact.  The nasopharynx is symmetric.  The central skull base, petrous temporal bones and calvarium remain intact.      IMPRESSION:   Chronic infarctions in the LEFT frontal and parietal lobes with unchanged focus of subarachnoid hemorrhage in the LEFT frontal infarction. Minimal subarachnoid hemorrhage also seen within the RIGHT lateral perimesencephalic cistern. Old infarction again noted in the RIGHT cerebellum. Stable 2.5 cm arachnoid cyst in the anterior LEFT middle cranial fossa.              < from: CT Head No Cont (05.12.20 @ 10:58) >    FINDINGS:      As compared to prior study, there is no significant interval change. Again there are ischemic changes in the left hemisphere with a now mature appearance of the left the MCA and OLU infarcts are present on 4/16/2020. Again noted is low-level blood in the left the anterior paracentral region. There is no evidence of rebleeding or new hemorrhage.    Also noted are involutional changes in both hemispheres with volume loss.    Ventricles are midline    Chronic ischemic changes are again noted in the cerebellum.    A left temporal fossa arachnoid cyst is again noted.    IMPRESSION:    1)  stable follow-up study with no significant change.  2)  follow-up MR imaging recommended for further assessment.                < end of copied text >                 < from: CT Head No Cont (05.10.20 @ 09:18) >    TECHNIQUE: Noncontrast CT of the head. Multiplanar reformations are submitted.    FINDINGS: Mild subarachnoid hemorrhage in the left frontal lobe, unchanged. Stable chronic left MCA territory infarcts. Stable chronic right inferior cerebellar hemisphere infarcts. Consider MRI as clinically warranted. Stable left frontal lobe arachnoid cyst.    There is periventricular and subcortical white matter hypodensity without mass effect, nonspecific, likely representing mild chronic microvascular ischemic changes. There is no compelling evidence for an acute transcortical infarction. There is no evidence of mass, mass effect, midline shift or extra-axial fluid collection. The lateral ventricles and cortical sulci are age-appropriate in size and configuration. The orbits, mastoid air cells and visualized paranasal sinuses are unremarkable. The calvariumis intact.    IMPRESSION: Mild subarachnoid hemorrhage in the left frontal lobe, unchanged. No hydrocephalus. No new hemorrhage.      < from: CT Head No Cont (05.09.20 @ 17:50) >    FINDINGS: Small arachnoid cyst adjacent to the left sylvian fissure on image 8, series 2 measures 2.2 x 2.2 cm, unchanged in size but demonstrates subtle increased density within it.     Subacute/chronic left frontal infarct with mild subarachnoid hemorrhage is demonstrated on image 15, series 2.    Evolving subacute/chronic left parietal lobe infarct. Chronic right inferior cerebellar hemisphere infarct.    There is periventricular and subcortical white matter hypodensity without mass effect, nonspecific, likely representing mild chronic microvascular ischemic changes. There is no compelling evidence for an acute transcortical infarction. There is no other evidence of mass, mass effect, midline shift or extra-axial fluid collection. The lateral ventricles and cortical sulci are age-appropriate in size and configuration. The orbits, mastoid air cells and visualized paranasal sinuses are unremarkable. The calvarium is intact.    IMPRESSION:  New mild subarachnoid hemorrhage in the paramedian left frontal lobe.Evolving subacute/chronic left MCA territory infarcts.. Findings discussed with Dr. Cross.          <

## 2020-05-22 NOTE — PROGRESS NOTE ADULT - EXTREMITIES COMMENTS
right knee and ankle in ace wrap, right ankle lateral laxity right knee and ankle in ace wrap, +inversion ankle and flexor tone/spasticity  PROM ankle to neutral position  no ecchymoses or swelling or TTP med and lat malleoli  no calf TTP

## 2020-05-22 NOTE — PROGRESS NOTE ADULT - COMMENTS
Spoke with patient in the AM using [employer] : Yi. patient states that she feels well. Her ankle pain is improving. Patient denies respiratory issues. States last bowel movement was 2 days ago and she is somewhat constipated. patient also states that she dause not enjoy shakes she is receiving as it causes some nausea. Spoke with patient in the AM using [employer] : Sinhala at 10:35 AM. patient states that she feels well. Her ankle pain is improving. Patient denies respiratory issues. States last bowel movement was 2 days ago and she is somewhat constipated. patient also states that she does not enjoy shakes (supplements) she is receiving as it causes some nausea.    Mood overall improved, noted to have improved initiation of speech and word finding although she does have paraphasic errors

## 2020-05-22 NOTE — PROGRESS NOTE ADULT - SUBJECTIVE AND OBJECTIVE BOX
Patient is a 69y old  Female who presents with a chief complaint of left frontal parietal CVA with right HP and aphasia, COVID+ 4/8/20 (21 May 2020 13:47)      HPI:  SHIREEN CASH is a 70yo female RH dominant with PMH of HTN, HLD, T2DM, hx CVA, GERD, known RBBB presented to [facility] ED on 4/8/20 with complaints of fever, chills, cough, chest pain, SOB for 5 days. Per patient, her  tested +COVID at an outpatient testing facility a few days prior, patient herself had tested negative at the time. They continued to share the same living space. ED workup showed +COVID and Mobitz type II block. EP was consulted and patient is s/p Medtronic Micra AV+ leadless pacemaker implantation via right femoral vein on 4/10/20. Hydroxychloroquine presumably not given due to prolonged QTc.     On 4/16/20, patient developed acute aphasia and right sided weakness. CT head showed an acute LEFT frontal/parietal CVA. CTA head/neck showed LEFT ICA stenosis but no occlusion. tPA was not administered due to timing and no penumbra. Placed on full dose lovenox as thoguht to be hypercoagulable secondary to COVID. Patient was cleared for regular consistency solids, thin liquids. Transferred to [place] for acute inpatient rehab services 4/23/20. (23 Apr 2020 14:26)      PAST MEDICAL & SURGICAL HISTORY:  H/O gastroesophageal reflux (GERD)  HTN (hypertension)  DM (diabetes mellitus)  History of benign brain tumor      MEDICATIONS  (STANDING):  amLODIPine   Tablet 5 milliGRAM(s) Oral daily  ammonium lactate 12% Lotion 1 Application(s) Topical two times a day  aspirin enteric coated 81 milliGRAM(s) Oral daily  atorvastatin 10 milliGRAM(s) Oral at bedtime  dextrose 5%. 1000 milliLiter(s) (50 mL/Hr) IV Continuous <Continuous>  dextrose 50% Injectable 12.5 Gram(s) IV Push once  dextrose 50% Injectable 25 Gram(s) IV Push once  dextrose 50% Injectable 25 Gram(s) IV Push once  enoxaparin Injectable 40 milliGRAM(s) SubCutaneous daily  famotidine    Tablet 20 milliGRAM(s) Oral daily  FLUoxetine 20 milliGRAM(s) Oral daily  gabapentin 300 milliGRAM(s) Oral at bedtime  hydrALAZINE 50 milliGRAM(s) Oral daily  insulin glargine Injectable (LANTUS) 20 Unit(s) SubCutaneous at bedtime  insulin lispro (HumaLOG) corrective regimen sliding scale   SubCutaneous at bedtime  insulin lispro (HumaLOG) corrective regimen sliding scale   SubCutaneous before breakfast  lidocaine   Patch 1 Patch Transdermal daily  melatonin 3 milliGRAM(s) Oral at bedtime  metFORMIN 1000 milliGRAM(s) Oral two times a day with meals  metoprolol tartrate 25 milliGRAM(s) Oral every 12 hours  polyethylene glycol 3350 17 Gram(s) Oral daily  sodium chloride 0.65% Nasal 1 Spray(s) Both Nostrils four times a day  sodium chloride 0.9% Bolus 1000 milliLiter(s) IV Bolus once    MEDICATIONS  (PRN):  acetaminophen   Tablet .. 650 milliGRAM(s) Oral every 6 hours PRN Temp greater or equal to 38C (100.4F), Mild Pain (1 - 3)  benzonatate 100 milliGRAM(s) Oral every 8 hours PRN Cough  dextrose 40% Gel 15 Gram(s) Oral once PRN Blood Glucose LESS THAN 70 milliGRAM(s)/deciliter  glucagon  Injectable 1 milliGRAM(s) IntraMuscular once PRN Glucose LESS THAN 70 milligrams/deciliter  lactulose Syrup 10 Gram(s) Oral daily PRN constipation  saline laxative (FLEET) Rectal Enema 1 Enema Rectal once PRN if no BM by this evening 5/13/20      Allergies    No Known Allergies    Intolerances            PHYSICAL EXAM  69y  Vital Signs Last 24 Hrs  T(C): 36.9 (22 May 2020 08:38), Max: 36.9 (22 May 2020 08:38)  T(F): 98.5 (22 May 2020 08:38), Max: 98.5 (22 May 2020 08:38)  HR: 68 (22 May 2020 08:38) (68 - 76)  BP: 146/71 (22 May 2020 08:38) (133/72 - 146/71)  BP(mean): --  RR: 15 (22 May 2020 08:38) (14 - 15)  SpO2: 98% (22 May 2020 08:38) (96% - 98%)  Daily     Daily       RECENT LABS:                          11.2   8.89  )-----------( 460      ( 21 May 2020 08:50 )             33.8     05-21    137  |  101  |  14  ----------------------------<  101<H>  4.2   |  25  |  0.83    Ca    9.3      21 May 2020 08:50    TPro  7.6  /  Alb  3.9  /  TBili  0.7  /  DBili  x   /  AST  19  /  ALT  16  /  AlkPhos  52  05-21    LIVER FUNCTIONS - ( 21 May 2020 08:50 )  Alb: 3.9 g/dL / Pro: 7.6 g/dL / ALK PHOS: 52 U/L / ALT: 16 U/L / AST: 19 U/L / GGT: x                   CAPILLARY BLOOD GLUCOSE      POCT Blood Glucose.: 117 mg/dL (22 May 2020 07:29)  POCT Blood Glucose.: 131 mg/dL (21 May 2020 21:47) Patient is a 69y old  Female who presents with a chief complaint of left frontal parietal CVA with right HP and aphasia, COVID+ 4/8/20 (21 May 2020 13:47)      HPI:  SHIREEN CASH is a 68yo female RH dominant with PMH of HTN, HLD, T2DM, hx CVA, GERD, known RBBB presented to [facility] ED on 4/8/20 with complaints of fever, chills, cough, chest pain, SOB for 5 days. Per patient, her  tested +COVID at an outpatient testing facility a few days prior, patient herself had tested negative at the time. They continued to share the same living space. ED workup showed +COVID and Mobitz type II block. EP was consulted and patient is s/p Medtronic Micra AV+ leadless pacemaker implantation via right femoral vein on 4/10/20. Hydroxychloroquine presumably not given due to prolonged QTc.     On 4/16/20, patient developed acute aphasia and right sided weakness. CT head showed an acute LEFT frontal/parietal CVA. CTA head/neck showed LEFT ICA stenosis but no occlusion. tPA was not administered due to timing and no penumbra. Placed on full dose lovenox as thoguht to be hypercoagulable secondary to COVID. Patient was cleared for regular consistency solids, thin liquids. Transferred to [place] for acute inpatient rehab services 4/23/20. (23 Apr 2020 14:26)      PAST MEDICAL & SURGICAL HISTORY:  H/O gastroesophageal reflux (GERD)  HTN (hypertension)  DM (diabetes mellitus)  History of benign brain tumor      MEDICATIONS  (STANDING):  amLODIPine   Tablet 5 milliGRAM(s) Oral daily  ammonium lactate 12% Lotion 1 Application(s) Topical two times a day  aspirin enteric coated 81 milliGRAM(s) Oral daily  atorvastatin 10 milliGRAM(s) Oral at bedtime  dextrose 5%. 1000 milliLiter(s) (50 mL/Hr) IV Continuous <Continuous>  dextrose 50% Injectable 12.5 Gram(s) IV Push once  dextrose 50% Injectable 25 Gram(s) IV Push once  dextrose 50% Injectable 25 Gram(s) IV Push once  enoxaparin Injectable 40 milliGRAM(s) SubCutaneous daily  famotidine    Tablet 20 milliGRAM(s) Oral daily  FLUoxetine 20 milliGRAM(s) Oral daily  gabapentin 300 milliGRAM(s) Oral at bedtime  hydrALAZINE 50 milliGRAM(s) Oral daily  insulin glargine Injectable (LANTUS) 20 Unit(s) SubCutaneous at bedtime  insulin lispro (HumaLOG) corrective regimen sliding scale   SubCutaneous at bedtime  insulin lispro (HumaLOG) corrective regimen sliding scale   SubCutaneous before breakfast  lidocaine   Patch 1 Patch Transdermal daily  melatonin 3 milliGRAM(s) Oral at bedtime  metFORMIN 1000 milliGRAM(s) Oral two times a day with meals  metoprolol tartrate 25 milliGRAM(s) Oral every 12 hours  polyethylene glycol 3350 17 Gram(s) Oral daily  sodium chloride 0.65% Nasal 1 Spray(s) Both Nostrils four times a day  sodium chloride 0.9% Bolus 1000 milliLiter(s) IV Bolus once    MEDICATIONS  (PRN):  acetaminophen   Tablet .. 650 milliGRAM(s) Oral every 6 hours PRN Temp greater or equal to 38C (100.4F), Mild Pain (1 - 3)  benzonatate 100 milliGRAM(s) Oral every 8 hours PRN Cough  dextrose 40% Gel 15 Gram(s) Oral once PRN Blood Glucose LESS THAN 70 milliGRAM(s)/deciliter  glucagon  Injectable 1 milliGRAM(s) IntraMuscular once PRN Glucose LESS THAN 70 milligrams/deciliter  lactulose Syrup 10 Gram(s) Oral daily PRN constipation  saline laxative (FLEET) Rectal Enema 1 Enema Rectal once PRN if no BM by this evening 5/13/20      Allergies    No Known Allergies    Intolerances            PHYSICAL EXAM  69y  Vital Signs Last 24 Hrs  T(C): 36.9 (22 May 2020 08:38), Max: 36.9 (22 May 2020 08:38)  T(F): 98.5 (22 May 2020 08:38), Max: 98.5 (22 May 2020 08:38)  HR: 68 (22 May 2020 08:38) (68 - 76)  BP: 146/71 (22 May 2020 08:38) (133/72 - 146/71)  BP(mean): --  RR: 15 (22 May 2020 08:38) (14 - 15)  SpO2: 98% (22 May 2020 08:38) (96% - 98%)  Daily     Daily       RECENT LABS:                          11.2   8.89  )-----------( 460      ( 21 May 2020 08:50 )             33.8     05-21    137  |  101  |  14  ----------------------------<  101<H>  4.2   |  25  |  0.83    Ca    9.3      21 May 2020 08:50    TPro  7.6  /  Alb  3.9  /  TBili  0.7  /  DBili  x   /  AST  19  /  ALT  16  /  AlkPhos  52  05-21    LIVER FUNCTIONS - ( 21 May 2020 08:50 )  Alb: 3.9 g/dL / Pro: 7.6 g/dL / ALK PHOS: 52 U/L / ALT: 16 U/L / AST: 19 U/L / GGT: x                   CAPILLARY BLOOD GLUCOSE      POCT Blood Glucose.: 117 mg/dL (22 May 2020 07:29)  POCT Blood Glucose.: 131 mg/dL (21 May 2020 21:47)

## 2020-05-23 PROCEDURE — 99232 SBSQ HOSP IP/OBS MODERATE 35: CPT

## 2020-05-23 PROCEDURE — 99233 SBSQ HOSP IP/OBS HIGH 50: CPT

## 2020-05-23 RX ADMIN — POLYETHYLENE GLYCOL 3350 17 GRAM(S): 17 POWDER, FOR SOLUTION ORAL at 13:11

## 2020-05-23 RX ADMIN — Medication 650 MILLIGRAM(S): at 16:58

## 2020-05-23 RX ADMIN — LIDOCAINE 1 PATCH: 4 CREAM TOPICAL at 13:10

## 2020-05-23 RX ADMIN — METFORMIN HYDROCHLORIDE 1000 MILLIGRAM(S): 850 TABLET ORAL at 16:57

## 2020-05-23 RX ADMIN — METFORMIN HYDROCHLORIDE 1000 MILLIGRAM(S): 850 TABLET ORAL at 07:50

## 2020-05-23 RX ADMIN — ENOXAPARIN SODIUM 40 MILLIGRAM(S): 100 INJECTION SUBCUTANEOUS at 13:10

## 2020-05-23 RX ADMIN — Medication 1 SPRAY(S): at 05:53

## 2020-05-23 RX ADMIN — Medication 81 MILLIGRAM(S): at 13:10

## 2020-05-23 RX ADMIN — Medication 25 MILLIGRAM(S): at 16:57

## 2020-05-23 RX ADMIN — Medication 1 SPRAY(S): at 16:57

## 2020-05-23 RX ADMIN — LACTULOSE 10 GRAM(S): 10 SOLUTION ORAL at 21:32

## 2020-05-23 RX ADMIN — Medication 1 SPRAY(S): at 00:40

## 2020-05-23 RX ADMIN — Medication 650 MILLIGRAM(S): at 10:47

## 2020-05-23 RX ADMIN — INSULIN GLARGINE 20 UNIT(S): 100 INJECTION, SOLUTION SUBCUTANEOUS at 21:31

## 2020-05-23 RX ADMIN — FAMOTIDINE 20 MILLIGRAM(S): 10 INJECTION INTRAVENOUS at 13:10

## 2020-05-23 RX ADMIN — Medication 3 MILLIGRAM(S): at 21:32

## 2020-05-23 RX ADMIN — Medication 50 MILLIGRAM(S): at 05:53

## 2020-05-23 RX ADMIN — SENNA PLUS 2 TABLET(S): 8.6 TABLET ORAL at 21:32

## 2020-05-23 RX ADMIN — LIDOCAINE 1 PATCH: 4 CREAM TOPICAL at 00:39

## 2020-05-23 RX ADMIN — Medication 1 APPLICATION(S): at 05:53

## 2020-05-23 RX ADMIN — Medication 1 SPRAY(S): at 13:11

## 2020-05-23 RX ADMIN — GABAPENTIN 300 MILLIGRAM(S): 400 CAPSULE ORAL at 21:31

## 2020-05-23 RX ADMIN — Medication 20 MILLIGRAM(S): at 13:10

## 2020-05-23 RX ADMIN — LIDOCAINE 1 PATCH: 4 CREAM TOPICAL at 20:16

## 2020-05-23 RX ADMIN — AMLODIPINE BESYLATE 5 MILLIGRAM(S): 2.5 TABLET ORAL at 05:54

## 2020-05-23 RX ADMIN — Medication 25 MILLIGRAM(S): at 05:53

## 2020-05-23 RX ADMIN — ATORVASTATIN CALCIUM 10 MILLIGRAM(S): 80 TABLET, FILM COATED ORAL at 21:31

## 2020-05-23 NOTE — PROGRESS NOTE ADULT - ASSESSMENT
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     on discharge fu pcp, neuro in op clinic 1 week     Left frontal parietal CVA and SAH, repeat CT head 5-20 improving   neuro and onc teams assist as outlined, restarted asa, vte proph lovenox dose, given risk for vte, cad thrombus higher than bleed risk   fall risk precautions     Chest pain resolved  most likely non-cardiac   CAD risk factor reduction     COVID-19 acute hypoxic respiratory failure resolved   - saturating 99 % on RA now   - respiratory status stable    Essential Hypertension  - BP controlled  - hydralazine, metoprolol, amlodipine     Mobitz type II AV block   - s/p PPM  follow up in cardiology clinic     RLE hematoma resorbing  - pain controlled and improving  - vascular sx: not likely compartment syndrome  - h/h stable  - hh stable     Hyponatremia stable   monitor na/time     DM II  - FS controlled, inpatient target 150's   - metformin 1000mg q12, Lantus and ISS    Leukocytosis resolved   - mild without signs of infection  - continue to monitor    Normocytic Anemia stable   - likely due to blood loss given hematoma    Full Code     VTE proph     Reviewed w patient and rehab team plan of care

## 2020-05-23 NOTE — PROGRESS NOTE ADULT - SUBJECTIVE AND OBJECTIVE BOX
in bed   sitting up     no co     known r facial droop better     r calf better   no pain   now     ROS all others are neg     ICU Vital Signs Last 24 Hrs  T(C): 36.6 (23 May 2020 08:07), Max: 36.9 (22 May 2020 16:48)  T(F): 97.9 (23 May 2020 08:07), Max: 98.4 (22 May 2020 16:48)  HR: 80 (23 May 2020 08:07) (63 - 80)  BP: 102/78 (23 May 2020 08:07) (102/78 - 143/72)  RR: 14 (23 May 2020 08:07) (14 - 15)  SpO2: 98% (23 May 2020 08:07) (96% - 98%)    MEDICATIONS  (STANDING):    amLODIPine   Tablet 5 milliGRAM(s) Oral daily  ammonium lactate 12% Lotion 1 Application(s) Topical two times a day  aspirin enteric coated 81 milliGRAM(s) Oral daily  atorvastatin 10 milliGRAM(s) Oral at bedtime  dextrose 5%. 1000 milliLiter(s) (50 mL/Hr) IV Continuous <Continuous>  dextrose 50% Injectable 12.5 Gram(s) IV Push once  dextrose 50% Injectable 25 Gram(s) IV Push once  dextrose 50% Injectable 25 Gram(s) IV Push once  enoxaparin Injectable 40 milliGRAM(s) SubCutaneous daily  famotidine    Tablet 20 milliGRAM(s) Oral daily  FLUoxetine 20 milliGRAM(s) Oral daily  gabapentin 300 milliGRAM(s) Oral at bedtime  hydrALAZINE 50 milliGRAM(s) Oral daily  insulin glargine Injectable (LANTUS) 20 Unit(s) SubCutaneous at bedtime  insulin lispro (HumaLOG) corrective regimen sliding scale   SubCutaneous at bedtime  insulin lispro (HumaLOG) corrective regimen sliding scale   SubCutaneous before breakfast  lidocaine   Patch 1 Patch Transdermal daily  melatonin 3 milliGRAM(s) Oral at bedtime  metFORMIN 1000 milliGRAM(s) Oral two times a day with meals  metoprolol tartrate 25 milliGRAM(s) Oral every 12 hours  polyethylene glycol 3350 17 Gram(s) Oral daily  senna 2 Tablet(s) Oral at bedtime  sodium chloride 0.65% Nasal 1 Spray(s) Both Nostrils four times a day    MEDICATIONS  (STANDING):  amLODIPine   Tablet 5 milliGRAM(s) Oral daily  ammonium lactate 12% Lotion 1 Application(s) Topical two times a day  aspirin enteric coated 81 milliGRAM(s) Oral daily  atorvastatin 10 milliGRAM(s) Oral at bedtime  dextrose 5%. 1000 milliLiter(s) (50 mL/Hr) IV Continuous <Continuous>  dextrose 50% Injectable 12.5 Gram(s) IV Push once  dextrose 50% Injectable 25 Gram(s) IV Push once  dextrose 50% Injectable 25 Gram(s) IV Push once  enoxaparin Injectable 40 milliGRAM(s) SubCutaneous daily  famotidine    Tablet 20 milliGRAM(s) Oral daily  FLUoxetine 20 milliGRAM(s) Oral daily  gabapentin 300 milliGRAM(s) Oral at bedtime  hydrALAZINE 50 milliGRAM(s) Oral daily  insulin glargine Injectable (LANTUS) 20 Unit(s) SubCutaneous at bedtime  insulin lispro (HumaLOG) corrective regimen sliding scale   SubCutaneous at bedtime  insulin lispro (HumaLOG) corrective regimen sliding scale   SubCutaneous before breakfast  lidocaine   Patch 1 Patch Transdermal daily  melatonin 3 milliGRAM(s) Oral at bedtime  metFORMIN 1000 milliGRAM(s) Oral two times a day with meals  metoprolol tartrate 25 milliGRAM(s) Oral every 12 hours  polyethylene glycol 3350 17 Gram(s) Oral daily  senna 2 Tablet(s) Oral at bedtime  sodium chloride 0.65% Nasal 1 Spray(s) Both Nostrils four times a day    MEDICATIONS  (PRN):  acetaminophen   Tablet .. 650 milliGRAM(s) Oral every 6 hours PRN Temp greater or equal to 38C (100.4F), Mild Pain (1 - 3)  benzonatate 100 milliGRAM(s) Oral every 8 hours PRN Cough  dextrose 40% Gel 15 Gram(s) Oral once PRN Blood Glucose LESS THAN 70 milliGRAM(s)/deciliter  glucagon  Injectable 1 milliGRAM(s) IntraMuscular once PRN Glucose LESS THAN 70 milligrams/deciliter  lactulose Syrup 10 Gram(s) Oral daily PRN constipation  saline laxative (FLEET) Rectal Enema 1 Enema Rectal once PRN if no BM by this evening 5/13/20

## 2020-05-23 NOTE — PROGRESS NOTE ADULT - SUBJECTIVE AND OBJECTIVE BOX
Cc: Gait dysfunction    HPI: Patient with no new medical issues today.  No BM for 4 days, however patient reports that she feels like she will have one today.  +flatus.  Denies abdominal pain, nausea, or vomiting.  Pain controlled, no chest pain, no N/V, no Fevers/Chills. No other new ROS  Has been tolerating rehabilitation program.    acetaminophen   Tablet .. 650 milliGRAM(s) Oral every 6 hours PRN  amLODIPine   Tablet 5 milliGRAM(s) Oral daily  ammonium lactate 12% Lotion 1 Application(s) Topical two times a day  aspirin enteric coated 81 milliGRAM(s) Oral daily  atorvastatin 10 milliGRAM(s) Oral at bedtime  benzonatate 100 milliGRAM(s) Oral every 8 hours PRN  dextrose 40% Gel 15 Gram(s) Oral once PRN  dextrose 5%. 1000 milliLiter(s) IV Continuous <Continuous>  dextrose 50% Injectable 12.5 Gram(s) IV Push once  dextrose 50% Injectable 25 Gram(s) IV Push once  dextrose 50% Injectable 25 Gram(s) IV Push once  enoxaparin Injectable 40 milliGRAM(s) SubCutaneous daily  famotidine    Tablet 20 milliGRAM(s) Oral daily  FLUoxetine 20 milliGRAM(s) Oral daily  gabapentin 300 milliGRAM(s) Oral at bedtime  glucagon  Injectable 1 milliGRAM(s) IntraMuscular once PRN  hydrALAZINE 50 milliGRAM(s) Oral daily  insulin glargine Injectable (LANTUS) 20 Unit(s) SubCutaneous at bedtime  insulin lispro (HumaLOG) corrective regimen sliding scale   SubCutaneous at bedtime  insulin lispro (HumaLOG) corrective regimen sliding scale   SubCutaneous before breakfast  lactulose Syrup 10 Gram(s) Oral daily PRN  lidocaine   Patch 1 Patch Transdermal daily  melatonin 3 milliGRAM(s) Oral at bedtime  metFORMIN 1000 milliGRAM(s) Oral two times a day with meals  metoprolol tartrate 25 milliGRAM(s) Oral every 12 hours  polyethylene glycol 3350 17 Gram(s) Oral daily  saline laxative (FLEET) Rectal Enema 1 Enema Rectal once PRN  senna 2 Tablet(s) Oral at bedtime  sodium chloride 0.65% Nasal 1 Spray(s) Both Nostrils four times a day      T(C): 36.6 (05-23-20 @ 08:07), Max: 36.9 (05-22-20 @ 16:48)  HR: 80 (05-23-20 @ 08:07) (63 - 80)  BP: 102/78 (05-23-20 @ 08:07) (102/78 - 143/72)  RR: 14 (05-23-20 @ 08:07) (14 - 15)  SpO2: 98% (05-23-20 @ 08:07) (96% - 98%)    In NAD  HEENT- EOMI  Heart- RRR, S1S2  Lungs- CTA bl.  Abd- + BS, NT, ND  Ext- No calf pain  Neuro- Exam unchanged          Imp: Patient with diagnosis of covid debility and left frontal and parietal CVA admitted for comprehensive acute rehabilitation.    Plan:  - Continue therapies  - DVT prophylaxis  - Skin- Turn q2h, check skin daily  - Continue current medications; patient medically stable.   - Patient is stable to continue current rehabilitation program.   - Patient w/o BM for 4 days but feels like she will have one this AM.  No signs or symptoms of obstruction.  Will continue to monitor and uptitrate bowel regimen if no BM by tomorrow.

## 2020-05-24 PROCEDURE — 99232 SBSQ HOSP IP/OBS MODERATE 35: CPT

## 2020-05-24 PROCEDURE — 99233 SBSQ HOSP IP/OBS HIGH 50: CPT

## 2020-05-24 RX ADMIN — FAMOTIDINE 20 MILLIGRAM(S): 10 INJECTION INTRAVENOUS at 15:24

## 2020-05-24 RX ADMIN — INSULIN GLARGINE 20 UNIT(S): 100 INJECTION, SOLUTION SUBCUTANEOUS at 23:11

## 2020-05-24 RX ADMIN — METFORMIN HYDROCHLORIDE 1000 MILLIGRAM(S): 850 TABLET ORAL at 09:05

## 2020-05-24 RX ADMIN — Medication 81 MILLIGRAM(S): at 13:03

## 2020-05-24 RX ADMIN — SENNA PLUS 2 TABLET(S): 8.6 TABLET ORAL at 23:12

## 2020-05-24 RX ADMIN — Medication 25 MILLIGRAM(S): at 17:50

## 2020-05-24 RX ADMIN — LIDOCAINE 1 PATCH: 4 CREAM TOPICAL at 01:25

## 2020-05-24 RX ADMIN — Medication 1 APPLICATION(S): at 05:48

## 2020-05-24 RX ADMIN — Medication 650 MILLIGRAM(S): at 17:51

## 2020-05-24 RX ADMIN — Medication 50 MILLIGRAM(S): at 05:48

## 2020-05-24 RX ADMIN — Medication 1 SPRAY(S): at 17:50

## 2020-05-24 RX ADMIN — METFORMIN HYDROCHLORIDE 1000 MILLIGRAM(S): 850 TABLET ORAL at 17:50

## 2020-05-24 RX ADMIN — Medication 3 MILLIGRAM(S): at 23:11

## 2020-05-24 RX ADMIN — ATORVASTATIN CALCIUM 10 MILLIGRAM(S): 80 TABLET, FILM COATED ORAL at 23:10

## 2020-05-24 RX ADMIN — Medication 1 SPRAY(S): at 05:49

## 2020-05-24 RX ADMIN — Medication 20 MILLIGRAM(S): at 15:24

## 2020-05-24 RX ADMIN — Medication 25 MILLIGRAM(S): at 05:49

## 2020-05-24 RX ADMIN — ENOXAPARIN SODIUM 40 MILLIGRAM(S): 100 INJECTION SUBCUTANEOUS at 14:00

## 2020-05-24 RX ADMIN — Medication 1 SPRAY(S): at 23:12

## 2020-05-24 RX ADMIN — Medication 1 SPRAY(S): at 01:26

## 2020-05-24 RX ADMIN — GABAPENTIN 300 MILLIGRAM(S): 400 CAPSULE ORAL at 23:11

## 2020-05-24 RX ADMIN — AMLODIPINE BESYLATE 5 MILLIGRAM(S): 2.5 TABLET ORAL at 05:48

## 2020-05-24 NOTE — PROGRESS NOTE ADULT - SUBJECTIVE AND OBJECTIVE BOX
Cc: Gait dysfunction    HPI: Patient with no new medical issues today.  Patient started on miralax yesterday and reports one bowel movement this morning.  Pain controlled, no chest pain, no N/V, no Fevers/Chills. No other new ROS  Has been tolerating rehabilitation program.    acetaminophen   Tablet .. 650 milliGRAM(s) Oral every 6 hours PRN  amLODIPine   Tablet 5 milliGRAM(s) Oral daily  ammonium lactate 12% Lotion 1 Application(s) Topical two times a day  aspirin enteric coated 81 milliGRAM(s) Oral daily  atorvastatin 10 milliGRAM(s) Oral at bedtime  benzonatate 100 milliGRAM(s) Oral every 8 hours PRN  dextrose 40% Gel 15 Gram(s) Oral once PRN  dextrose 5%. 1000 milliLiter(s) IV Continuous <Continuous>  dextrose 50% Injectable 12.5 Gram(s) IV Push once  dextrose 50% Injectable 25 Gram(s) IV Push once  dextrose 50% Injectable 25 Gram(s) IV Push once  enoxaparin Injectable 40 milliGRAM(s) SubCutaneous daily  famotidine    Tablet 20 milliGRAM(s) Oral daily  FLUoxetine 20 milliGRAM(s) Oral daily  gabapentin 300 milliGRAM(s) Oral at bedtime  glucagon  Injectable 1 milliGRAM(s) IntraMuscular once PRN  hydrALAZINE 50 milliGRAM(s) Oral daily  insulin glargine Injectable (LANTUS) 20 Unit(s) SubCutaneous at bedtime  insulin lispro (HumaLOG) corrective regimen sliding scale   SubCutaneous at bedtime  insulin lispro (HumaLOG) corrective regimen sliding scale   SubCutaneous before breakfast  lactulose Syrup 10 Gram(s) Oral daily PRN  lidocaine   Patch 1 Patch Transdermal daily  melatonin 3 milliGRAM(s) Oral at bedtime  metFORMIN 1000 milliGRAM(s) Oral two times a day with meals  metoprolol tartrate 25 milliGRAM(s) Oral every 12 hours  polyethylene glycol 3350 17 Gram(s) Oral daily  saline laxative (FLEET) Rectal Enema 1 Enema Rectal once PRN  senna 2 Tablet(s) Oral at bedtime  sodium chloride 0.65% Nasal 1 Spray(s) Both Nostrils four times a day      T(C): 36.6 (05-23-20 @ 08:07), Max: 36.9 (05-22-20 @ 16:48)  HR: 80 (05-23-20 @ 08:07) (63 - 80)  BP: 102/78 (05-23-20 @ 08:07) (102/78 - 143/72)  RR: 14 (05-23-20 @ 08:07) (14 - 15)  SpO2: 98% (05-23-20 @ 08:07) (96% - 98%)    In NAD  HEENT- EOMI  Heart- RRR, S1S2  Lungs- CTA bl.  Abd- + BS, NT, ND, normoactive bs  Ext- No calf pain  Neuro- Exam unchanged          Imp: Patient with diagnosis of covid debility and left frontal and parietal CVA admitted for comprehensive acute rehabilitation.    Plan:  - Continue therapies  - DVT prophylaxis  - Skin- Turn q2h, check skin daily  - Continue current medications; patient medically stable.   - Patient is stable to continue current rehabilitation program.   - Patient started on miralax and had one bowel movement.

## 2020-05-24 NOTE — PROGRESS NOTE ADULT - SUBJECTIVE AND OBJECTIVE BOX
lying in bed   comfortable, smiles     no co     r calf no pain     ros all others are neg   no chest pain   no sob  no le edema     Vital Signs Last 24 Hrs  T(C): 37 (24 May 2020 09:01), Max: 37 (24 May 2020 09:01)  T(F): 98.6 (24 May 2020 09:01), Max: 98.6 (24 May 2020 09:01)  HR: 72 (24 May 2020 09:01) (64 - 88)  BP: 130/88 (24 May 2020 09:01) (128/77 - 158/82  RR: 15 (24 May 2020 09:01) (14 - 15)  SpO2: 96% (24 May 2020 09:01) (95% - 96%)    MEDICATIONS  (STANDING):    amLODIPine   Tablet 5 milliGRAM(s) Oral daily  ammonium lactate 12% Lotion 1 Application(s) Topical two times a day  aspirin enteric coated 81 milliGRAM(s) Oral daily  atorvastatin 10 milliGRAM(s) Oral at bedtime  dextrose 5%. 1000 milliLiter(s) (50 mL/Hr) IV Continuous <Continuous>  dextrose 50% Injectable 12.5 Gram(s) IV Push once  dextrose 50% Injectable 25 Gram(s) IV Push once  dextrose 50% Injectable 25 Gram(s) IV Push once  enoxaparin Injectable 40 milliGRAM(s) SubCutaneous daily  famotidine    Tablet 20 milliGRAM(s) Oral daily  FLUoxetine 20 milliGRAM(s) Oral daily  gabapentin 300 milliGRAM(s) Oral at bedtime  hydrALAZINE 50 milliGRAM(s) Oral daily  insulin glargine Injectable (LANTUS) 20 Unit(s) SubCutaneous at bedtime  insulin lispro (HumaLOG) corrective regimen sliding scale   SubCutaneous at bedtime  insulin lispro (HumaLOG) corrective regimen sliding scale   SubCutaneous before breakfast  lidocaine   Patch 1 Patch Transdermal daily  melatonin 3 milliGRAM(s) Oral at bedtime  metFORMIN 1000 milliGRAM(s) Oral two times a day with meals  metoprolol tartrate 25 milliGRAM(s) Oral every 12 hours  polyethylene glycol 3350 17 Gram(s) Oral daily  senna 2 Tablet(s) Oral at bedtime  sodium chloride 0.65% Nasal 1 Spray(s) Both Nostrils four times a day    MEDICATIONS  (PRN):  acetaminophen   Tablet .. 650 milliGRAM(s) Oral every 6 hours PRN Temp greater or equal to 38C (100.4F), Mild Pain (1 - 3)  benzonatate 100 milliGRAM(s) Oral every 8 hours PRN Cough  dextrose 40% Gel 15 Gram(s) Oral once PRN Blood Glucose LESS THAN 70 milliGRAM(s)/deciliter  glucagon  Injectable 1 milliGRAM(s) IntraMuscular once PRN Glucose LESS THAN 70 milligrams/deciliter  lactulose Syrup 10 Gram(s) Oral daily PRN constipation  saline laxative (FLEET) Rectal Enema 1 Enema Rectal once PRN if no BM by this evening 5/13/20

## 2020-05-24 NOTE — PROGRESS NOTE ADULT - ASSESSMENT
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     on discharge fu pcp, neuro in op clinic 1 week ...    Left frontal parietal CVA and SAH, repeat CT head 5-20 improving   neuro and onc teams assist as outlined, restarted asa, vte proph lovenox dose, given risk for vte, cad thrombus higher than bleed risk   fall risk precautions     Chest pain resolved  most likely non-cardiac   CAD risk factor reduction     COVID-19 acute hypoxic respiratory failure resolved   - saturating 99 % on RA now   - respiratory status stable    Essential Hypertension  - BP controlled  - hydralazine, metoprolol, amlodipine     Mobitz type II AV block   - s/p PPM  follow up in cardiology clinic     RLE hematoma resorbing  - pain controlled and improving  - vascular sx: not likely compartment syndrome  - h/h stable  - hh stable     Hyponatremia stable   monitor na/time     DM II  - FS controlled, inpatient target 150's   - metformin 1000mg q12, Lantus and ISS    Leukocytosis resolved   - mild without signs of infection  - continue to monitor    Normocytic Anemia stable   - likely due to blood loss given hematoma    Full Code     VTE proph     Reviewed w patient and rehab team plan of care

## 2020-05-25 LAB
ALBUMIN SERPL ELPH-MCNC: 3.6 G/DL — SIGNIFICANT CHANGE UP (ref 3.3–5)
ALP SERPL-CCNC: 40 U/L — SIGNIFICANT CHANGE UP (ref 40–120)
ALT FLD-CCNC: 15 U/L — SIGNIFICANT CHANGE UP (ref 10–45)
ANION GAP SERPL CALC-SCNC: 11 MMOL/L — SIGNIFICANT CHANGE UP (ref 5–17)
AST SERPL-CCNC: 15 U/L — SIGNIFICANT CHANGE UP (ref 10–40)
BASOPHILS # BLD AUTO: 0.07 K/UL — SIGNIFICANT CHANGE UP (ref 0–0.2)
BASOPHILS NFR BLD AUTO: 0.9 % — SIGNIFICANT CHANGE UP (ref 0–2)
BILIRUB SERPL-MCNC: 0.4 MG/DL — SIGNIFICANT CHANGE UP (ref 0.2–1.2)
BUN SERPL-MCNC: 13 MG/DL — SIGNIFICANT CHANGE UP (ref 7–23)
CALCIUM SERPL-MCNC: 9.3 MG/DL — SIGNIFICANT CHANGE UP (ref 8.4–10.5)
CHLORIDE SERPL-SCNC: 100 MMOL/L — SIGNIFICANT CHANGE UP (ref 96–108)
CO2 SERPL-SCNC: 25 MMOL/L — SIGNIFICANT CHANGE UP (ref 22–31)
CREAT SERPL-MCNC: 0.72 MG/DL — SIGNIFICANT CHANGE UP (ref 0.5–1.3)
EOSINOPHIL # BLD AUTO: 0.31 K/UL — SIGNIFICANT CHANGE UP (ref 0–0.5)
EOSINOPHIL NFR BLD AUTO: 3.9 % — SIGNIFICANT CHANGE UP (ref 0–6)
GLUCOSE SERPL-MCNC: 104 MG/DL — HIGH (ref 70–99)
HCT VFR BLD CALC: 32.6 % — LOW (ref 34.5–45)
HGB BLD-MCNC: 10.7 G/DL — LOW (ref 11.5–15.5)
IMM GRANULOCYTES NFR BLD AUTO: 0.6 % — SIGNIFICANT CHANGE UP (ref 0–1.5)
LYMPHOCYTES # BLD AUTO: 2.69 K/UL — SIGNIFICANT CHANGE UP (ref 1–3.3)
LYMPHOCYTES # BLD AUTO: 33.8 % — SIGNIFICANT CHANGE UP (ref 13–44)
MCHC RBC-ENTMCNC: 28.8 PG — SIGNIFICANT CHANGE UP (ref 27–34)
MCHC RBC-ENTMCNC: 32.8 GM/DL — SIGNIFICANT CHANGE UP (ref 32–36)
MCV RBC AUTO: 87.9 FL — SIGNIFICANT CHANGE UP (ref 80–100)
MONOCYTES # BLD AUTO: 0.85 K/UL — SIGNIFICANT CHANGE UP (ref 0–0.9)
MONOCYTES NFR BLD AUTO: 10.7 % — SIGNIFICANT CHANGE UP (ref 2–14)
NEUTROPHILS # BLD AUTO: 3.98 K/UL — SIGNIFICANT CHANGE UP (ref 1.8–7.4)
NEUTROPHILS NFR BLD AUTO: 50.1 % — SIGNIFICANT CHANGE UP (ref 43–77)
NRBC # BLD: 0 /100 WBCS — SIGNIFICANT CHANGE UP (ref 0–0)
PLATELET # BLD AUTO: 402 K/UL — HIGH (ref 150–400)
POTASSIUM SERPL-MCNC: 4.1 MMOL/L — SIGNIFICANT CHANGE UP (ref 3.5–5.3)
POTASSIUM SERPL-SCNC: 4.1 MMOL/L — SIGNIFICANT CHANGE UP (ref 3.5–5.3)
PROT SERPL-MCNC: 6.9 G/DL — SIGNIFICANT CHANGE UP (ref 6–8.3)
RBC # BLD: 3.71 M/UL — LOW (ref 3.8–5.2)
RBC # FLD: 15.8 % — HIGH (ref 10.3–14.5)
SODIUM SERPL-SCNC: 136 MMOL/L — SIGNIFICANT CHANGE UP (ref 135–145)
WBC # BLD: 7.95 K/UL — SIGNIFICANT CHANGE UP (ref 3.8–10.5)
WBC # FLD AUTO: 7.95 K/UL — SIGNIFICANT CHANGE UP (ref 3.8–10.5)

## 2020-05-25 PROCEDURE — 99233 SBSQ HOSP IP/OBS HIGH 50: CPT

## 2020-05-25 PROCEDURE — 99232 SBSQ HOSP IP/OBS MODERATE 35: CPT

## 2020-05-25 RX ORDER — NYSTATIN CREAM 100000 [USP'U]/G
1 CREAM TOPICAL
Refills: 0 | Status: DISCONTINUED | OUTPATIENT
Start: 2020-05-25 | End: 2020-05-29

## 2020-05-25 RX ADMIN — INSULIN GLARGINE 20 UNIT(S): 100 INJECTION, SOLUTION SUBCUTANEOUS at 23:08

## 2020-05-25 RX ADMIN — GABAPENTIN 300 MILLIGRAM(S): 400 CAPSULE ORAL at 23:08

## 2020-05-25 RX ADMIN — Medication 3 MILLIGRAM(S): at 23:10

## 2020-05-25 RX ADMIN — SENNA PLUS 2 TABLET(S): 8.6 TABLET ORAL at 23:10

## 2020-05-25 RX ADMIN — Medication 25 MILLIGRAM(S): at 16:29

## 2020-05-25 RX ADMIN — AMLODIPINE BESYLATE 5 MILLIGRAM(S): 2.5 TABLET ORAL at 07:07

## 2020-05-25 RX ADMIN — Medication 2: at 08:09

## 2020-05-25 RX ADMIN — LIDOCAINE 1 PATCH: 4 CREAM TOPICAL at 23:11

## 2020-05-25 RX ADMIN — METFORMIN HYDROCHLORIDE 1000 MILLIGRAM(S): 850 TABLET ORAL at 16:29

## 2020-05-25 RX ADMIN — Medication 1 APPLICATION(S): at 16:28

## 2020-05-25 RX ADMIN — Medication 1 SPRAY(S): at 16:29

## 2020-05-25 RX ADMIN — Medication 25 MILLIGRAM(S): at 07:26

## 2020-05-25 RX ADMIN — Medication 1 APPLICATION(S): at 07:07

## 2020-05-25 RX ADMIN — NYSTATIN CREAM 1 APPLICATION(S): 100000 CREAM TOPICAL at 16:29

## 2020-05-25 RX ADMIN — Medication 50 MILLIGRAM(S): at 07:24

## 2020-05-25 RX ADMIN — Medication 1 SPRAY(S): at 23:11

## 2020-05-25 RX ADMIN — ATORVASTATIN CALCIUM 10 MILLIGRAM(S): 80 TABLET, FILM COATED ORAL at 23:08

## 2020-05-25 RX ADMIN — METFORMIN HYDROCHLORIDE 1000 MILLIGRAM(S): 850 TABLET ORAL at 08:41

## 2020-05-25 RX ADMIN — LIDOCAINE 1 PATCH: 4 CREAM TOPICAL at 14:42

## 2020-05-25 RX ADMIN — Medication 1 SPRAY(S): at 12:53

## 2020-05-25 RX ADMIN — Medication 1 SPRAY(S): at 07:26

## 2020-05-25 RX ADMIN — LIDOCAINE 1 PATCH: 4 CREAM TOPICAL at 23:09

## 2020-05-25 RX ADMIN — Medication 81 MILLIGRAM(S): at 12:52

## 2020-05-25 RX ADMIN — FAMOTIDINE 20 MILLIGRAM(S): 10 INJECTION INTRAVENOUS at 12:52

## 2020-05-25 RX ADMIN — ENOXAPARIN SODIUM 40 MILLIGRAM(S): 100 INJECTION SUBCUTANEOUS at 12:52

## 2020-05-25 RX ADMIN — Medication 20 MILLIGRAM(S): at 12:52

## 2020-05-25 NOTE — PROGRESS NOTE ADULT - VASCULAR
Equal and normal pulses (carotid, femoral, dorsalis pedis)

## 2020-05-25 NOTE — PROGRESS NOTE ADULT - SKIN
No lesions; no rash

## 2020-05-25 NOTE — PROGRESS NOTE ADULT - LYMPH NODES
No lymphadedenopathy

## 2020-05-25 NOTE — PROGRESS NOTE ADULT - SUBJECTIVE AND OBJECTIVE BOX
about to be wheeled away     to PT today     no co   no chest pain   no sob   no fc     ros all others are neg     Vital Signs Last 24 Hrs    T(C): 36.4 (24 May 2020 23:22), Max: 36.4 (24 May 2020 23:22)  T(F): 97.6 (24 May 2020 23:22), Max: 97.6 (24 May 2020 23:22)  HR: 74 (25 May 2020 07:37) (70 - 74)  BP: 130/80 (25 May 2020 07:37) (130/80 - 133/80)  BP(mean): --  RR: 16 (24 May 2020 23:22) (16 - 16)  SpO2: 96% (24 May 2020 23:22) (96% - 96%)    MEDICATIONS  (STANDING):  amLODIPine   Tablet 5 milliGRAM(s) Oral daily  ammonium lactate 12% Lotion 1 Application(s) Topical two times a day  aspirin enteric coated 81 milliGRAM(s) Oral daily  atorvastatin 10 milliGRAM(s) Oral at bedtime  dextrose 5%. 1000 milliLiter(s) (50 mL/Hr) IV Continuous <Continuous>  dextrose 50% Injectable 12.5 Gram(s) IV Push once  dextrose 50% Injectable 25 Gram(s) IV Push once  dextrose 50% Injectable 25 Gram(s) IV Push once  enoxaparin Injectable 40 milliGRAM(s) SubCutaneous daily  famotidine    Tablet 20 milliGRAM(s) Oral daily  FLUoxetine 20 milliGRAM(s) Oral daily  gabapentin 300 milliGRAM(s) Oral at bedtime  hydrALAZINE 50 milliGRAM(s) Oral daily  insulin glargine Injectable (LANTUS) 20 Unit(s) SubCutaneous at bedtime  insulin lispro (HumaLOG) corrective regimen sliding scale   SubCutaneous at bedtime  insulin lispro (HumaLOG) corrective regimen sliding scale   SubCutaneous before breakfast  lidocaine   Patch 1 Patch Transdermal daily  melatonin 3 milliGRAM(s) Oral at bedtime  metFORMIN 1000 milliGRAM(s) Oral two times a day with meals  metoprolol tartrate 25 milliGRAM(s) Oral every 12 hours  nystatin Powder 1 Application(s) Topical two times a day  polyethylene glycol 3350 17 Gram(s) Oral daily  senna 2 Tablet(s) Oral at bedtime  sodium chloride 0.65% Nasal 1 Spray(s) Both Nostrils four times a day    MEDICATIONS  (STANDING):  amLODIPine   Tablet 5 milliGRAM(s) Oral daily  ammonium lactate 12% Lotion 1 Application(s) Topical two times a day  aspirin enteric coated 81 milliGRAM(s) Oral daily  atorvastatin 10 milliGRAM(s) Oral at bedtime  dextrose 5%. 1000 milliLiter(s) (50 mL/Hr) IV Continuous <Continuous>  dextrose 50% Injectable 12.5 Gram(s) IV Push once  dextrose 50% Injectable 25 Gram(s) IV Push once  dextrose 50% Injectable 25 Gram(s) IV Push once  enoxaparin Injectable 40 milliGRAM(s) SubCutaneous daily  famotidine    Tablet 20 milliGRAM(s) Oral daily  FLUoxetine 20 milliGRAM(s) Oral daily  gabapentin 300 milliGRAM(s) Oral at bedtime  hydrALAZINE 50 milliGRAM(s) Oral daily  insulin glargine Injectable (LANTUS) 20 Unit(s) SubCutaneous at bedtime  insulin lispro (HumaLOG) corrective regimen sliding scale   SubCutaneous at bedtime  insulin lispro (HumaLOG) corrective regimen sliding scale   SubCutaneous before breakfast  lidocaine   Patch 1 Patch Transdermal daily  melatonin 3 milliGRAM(s) Oral at bedtime  metFORMIN 1000 milliGRAM(s) Oral two times a day with meals  metoprolol tartrate 25 milliGRAM(s) Oral every 12 hours  nystatin Powder 1 Application(s) Topical two times a day  polyethylene glycol 3350 17 Gram(s) Oral daily  senna 2 Tablet(s) Oral at bedtime  sodium chloride 0.65% Nasal 1 Spray(s) Both Nostrils four times a day    MEDICATIONS  (PRN):  acetaminophen   Tablet .. 650 milliGRAM(s) Oral every 6 hours PRN Temp greater or equal to 38C (100.4F), Mild Pain (1 - 3)  benzonatate 100 milliGRAM(s) Oral every 8 hours PRN Cough  dextrose 40% Gel 15 Gram(s) Oral once PRN Blood Glucose LESS THAN 70 milliGRAM(s)/deciliter  glucagon  Injectable 1 milliGRAM(s) IntraMuscular once PRN Glucose LESS THAN 70 milligrams/deciliter  lactulose Syrup 10 Gram(s) Oral daily PRN constipation  saline laxative (FLEET) Rectal Enema 1 Enema Rectal once PRN if no BM by this evening 5/13/20    CBC Full  -  ( 25 May 2020 05:30 )  WBC Count : 7.95 K/uL  RBC Count : 3.71 M/uL  Hemoglobin : 10.7 g/dL  Hematocrit : 32.6 %  Platelet Count - Automated : 402 K/uL  Mean Cell Volume : 87.9 fl  Mean Cell Hemoglobin : 28.8 pg  Mean Cell Hemoglobin Concentration : 32.8 gm/dL  Auto Neutrophil # : 3.98 K/uL  Auto Lymphocyte # : 2.69 K/uL  Auto Monocyte # : 0.85 K/uL  Auto Eosinophil # : 0.31 K/uL  Auto Basophil # : 0.07 K/uL  Auto Neutrophil % : 50.1 %  Auto Lymphocyte % : 33.8 %  Auto Monocyte % : 10.7 %  Auto Eosinophil % : 3.9 %  Auto Basophil % : 0.9 %    05-25    136  |  100  |  13  ----------------------------<  104<H>  4.1   |  25  |  0.72    Ca    9.3      25 May 2020 05:30    TPro  6.9  /  Alb  3.6  /  TBili  0.4  /  DBili  x   /  AST  15  /  ALT  15  /  AlkPhos  40  05-25

## 2020-05-25 NOTE — PROGRESS NOTE ADULT - GUM GEN PE MLT EXAM PC
Normal genitalia; no lesions; no discharge

## 2020-05-25 NOTE — PROGRESS NOTE ADULT - NS ABD PE RECTAL EXAM
not examined
No mass or lesion
not examined

## 2020-05-25 NOTE — PROGRESS NOTE ADULT - ASSESSMENT
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     Left frontal parietal CVA and SAH, repeat CT head 5-20 improving ...   neuro and onc teams assist as outlined, restarted asa, vte proph lovenox dose, given risk for vte, cad thrombus higher than bleed risk   fall risk precautions     Chest pain resolved no recurrence   most likely non-cardiac   CAD risk factor reduction     COVID-19 acute hypoxic respiratory failure resolved   - saturating 99 % on RA now   - respiratory status stable    Essential Hypertension  - BP controlled  - hydralazine, metoprolol, amlodipine     Mobitz type II AV block   - s/p PPM  follow up in cardiology clinic     RLE hematoma resorbing  - pain controlled and improving  - vascular sx: not likely compartment syndrome  - h/h stable  - hh stable     Hyponatremia stable   monitor na/time     DM II  - FS controlled, inpatient target 150's   - metformin 1000mg q12, Lantus and ISS    Normocytic Anemia stable   - likely due to blood loss given hematoma    Full Code     VTE proph     Reviewed w patient and rehab team plan of care

## 2020-05-25 NOTE — PROGRESS NOTE ADULT - BACK
No deformity or limitation of movement
not examined
No deformity or limitation of movement

## 2020-05-25 NOTE — PROGRESS NOTE ADULT - ADDITIONAL PE
History     Chief Complaint:  Shortness of breath     HPI:   The history is provided by the patient.      Ana Luisa Lee is a 82 year old female with a history of asthma, COPD, dementia, and paroxysmal SVT who presents to the emergency department via EMS for evaluation of shortness of breath. The patient reports progressively worsening shortness of breath, wheezing, and nonproductive cough over the last 2 weeks. Today and yesterday her symptoms grew grew more severe. She has been treating with nebulization and inhalers at her nursing home. She was sent to the emergency department for evaluation of these worsening symptoms. Here, the patient endorses that her shortness of breath is exacerbated with exertion. She has not been on antibiotics or steroids. She denies any fevers, chills, or change in her leg swelling.      CARDIAC RISK FACTORS:  Sex:    Female   Tobacco:   Positive (former)  Hypertension:   Positive   Hyperlipidemia:  Positive   Diabetes:   Positive   Family History:  Negative     PE/DVT RISK FACTORS:  Sex:    Female  Tobacco:   Positive (former)  Cancer:   Negative   Travel:   Negative   Surgery:   Negative   Other immobilization: Negative   Personal history:  Negative   Family history:  Negative      Allergies:  Hydralazine - anxiety   Penicillin G - hives   Meloxicam - dizziness   Metoprolol - skin rash   Norvasc [Amlodipine Besylate]  - hives      Medications:    Tylenol   Albuterol inhaler   Pulmicort inhaler   Iron   Robafen   Duoneb   Xopenex   Levothyroxine   Lisinopril   Imodium   Antivert  Omeprazole   Zofran ODT   Miralax   Daliresp   Flomax   Incruse Ellipta     Past Medical History:    Anemia   Dementia without behavioral disturbance   Arthritis   Chronic nausea   Adjustment disorder with mixed anxiety and depressed mood   Premature atrial contraction   Aortic stenosis   Asthma   Chronic intermittent steroid use   COPD  Hypertension   Esophageal stricture   Hyperlipidemia 
  Hypothyroidism   Osteoporosis   Paroxysmal SVT  PVC  Type II diabetes mellitus     Past Surgical History:    Bunionectomy lapidus with tarsal metatarsal fusion   DOROTHY  Tonsillectomy     Family History:    Mother - cerebrovascular disease, hypertension     Social History:  Presents via EMS    Tobacco use: Former smoker, quit 1980   Alcohol use: No   PCP: Db Alvarez    Marital Status:        Review of Systems   Constitutional: Negative for chills and fever.   Respiratory: Positive for cough, shortness of breath and wheezing.    Cardiovascular: Negative for leg swelling.   All other systems reviewed and are negative.        Physical Exam     Patient Vitals for the past 24 hrs:   BP Temp Temp src Pulse Heart Rate Resp SpO2 Weight   02/09/19 2130 132/82 -- -- 98 98 23 98 % --   02/09/19 2104 -- -- -- -- 87 15 97 % --   02/09/19 2000 140/79 98.3  F (36.8  C) Oral -- 102 18 99 % 54.4 kg (120 lb)        Physical Exam  Constitutional: Alert, attentive  HENT:    Nose: Nose normal.    Mouth/Throat: Oropharynx is clear, mucous membranes are moist   Eyes: EOM are normal.   CV: regular rate and rhythm; no murmurs, rubs or gallups  Chest: Effort normal, diminished throughout, frequently coughing  GI:  There is no tenderness. No distension. Normal bowel sounds  MSK: Normal range of motion.   Neurological: Alert, attentive  Skin: Skin is warm and dry.      Emergency Department Course   ECG (20:04:05):  Rate 102 bpm. MI interval 164. QRS duration 88. QT/QTc 330/430. P-R-T axes 51 6 58. Sinus tachycardia. Anterior infarct, age undetermined. Abnormal ECG. Agree with computer interpretation. No significant changes from prior EKG on 7/22/18.  Interpreted at 2006 by Damien Phelps MD.     Imaging:  Radiographic findings were communicated with the patient who voiced understanding of the findings.     XR Chest, 2 views:  IMPRESSION: Lungs are clear. Mild cardiomegaly. Hiatal hernia behind  the heart. No pleural effusion, no 
change.    Imaging independently reviewed and agree with radiologist interpretation.     Laboratory:  CBC: HGB 8.6 (L), ow WNL (WBC 5.3, )     BMP: Sodium 129 (L), Glucose 119 (H), ow WNL (Creatinine 0.63)   D-dimer Quant: 0.4  Nt probnp inpatient (BNP): 681   2024: Troponin I: <0.015     Interventions:  2132: Duoneb 3 mL nebulization    2137: Solu-Medrol 125 mg IV      Emergency Department Course:  Past medical records, nursing notes, and vitals reviewed.  0805: I performed an exam of the patient and obtained history, as documented above.    Blood drawn. This was sent to the lab for further testing, results above.     The patient was sent for a XR while in the emergency department, findings above.     2133: I rechecked the patient. Explained findings to patient. Speaking in full sentences.    An IV was inserted to administer the above interventions.      Patient discharged home with instructions regarding supportive care, medications, and reasons to return. The importance of close follow-up was reviewed.      Impression & Plan      Medical Decision Making:  This is an 82-year-old female with history of asthma and COPD who presents for evaluation of subacute increase in cough.  Per paramedic report, the patient has not been acutely ill but has had slowly increasing cough for the last 2 days.  Patient does have a history of mild dementia but notes that perhaps it has been 2 weeks of coughing and URI symptoms.  Exam shows diminished breath sounds bilaterally but no significant respiratory distress.  D-dimer is negative, essentially ruling out PE.  Screening EKG and troponin are negative.  She has no increase in lower extremity edema and BNP is not frankly positive.  Chest x-ray is unremarkable for consolidation or other acute process.  She seems to have improvement in symptoms with duo nebs.  We will treat this as likely asthma or COPD exacerbation with 4 more days of steroids.  I will prescribe doxycycline 
clinical data, labs med list reviewed
given her history of COPD.  She should follow-up with primary care in 1-2 days return immediately for chest pain, shortness of breath, or any other concerns.    Diagnosis:    ICD-10-CM    1. COPD exacerbation (H) J44.1        Disposition:  Discharged to home with plan as outlined.      Discharge Medications:     Medication List      Started    doxycycline hyclate 100 MG capsule  Commonly known as:  VIBRAMYCIN  100 mg, Oral, 2 TIMES DAILY        Modified    predniSONE 20 MG tablet  Commonly known as:  DELTASONE  40 mg, Oral, DAILY  What changed:      medication strength    how much to take             Scribe Disclosure:   Lopez VERGARA, am serving as a scribe at 8:05 PM on 2/9/2019 to document services personally performed by Damien Phelps MD based on my observations and the provider's statements to me.       Damien Phelps MD  2/9/2019   [facility] EMERGENCY DEPARTMENT       Damien Phelps MD  02/09/19 [id number]    
clinical data, labs and imaging reviewed and viewed personally, formulated the below plan
clinical data, labs and med list reviewed
clinical data, labs med list reviewed
Clinical data, labs and med list reviewed
clincal data, labs med list reviewed
clinical data, labs and imaging reviewed duplex viewed personally
clinical data, labs and med list reviewed
clinical data, labs med list
clinical data, labs med list reviewed
clinical data, labs med list reviewed
clinical data, labs med list reviewed   CT RLE viewed, axial, sagital, coronal sections, has hematoma
clinical data, labs med list reviewed,
clinical data, labs, med list reviewed
frail, clinical data reviewed
labs reviewed and viewed
labs reviewed   clinical data analyzed

## 2020-05-25 NOTE — PROGRESS NOTE ADULT - EYES
EOMI; PERRL; no drainage or redness

## 2020-05-25 NOTE — PROGRESS NOTE ADULT - SUBJECTIVE AND OBJECTIVE BOX
Cc: Gait dysfunction    HPI: Patient with no new medical issues today.  Normal bowel movement this AM.  Pain controlled, no chest pain, no N/V, no Fevers/Chills. No other new ROS  Has been tolerating rehabilitation program.    acetaminophen   Tablet .. 650 milliGRAM(s) Oral every 6 hours PRN  amLODIPine   Tablet 5 milliGRAM(s) Oral daily  ammonium lactate 12% Lotion 1 Application(s) Topical two times a day  aspirin enteric coated 81 milliGRAM(s) Oral daily  atorvastatin 10 milliGRAM(s) Oral at bedtime  benzonatate 100 milliGRAM(s) Oral every 8 hours PRN  dextrose 40% Gel 15 Gram(s) Oral once PRN  dextrose 5%. 1000 milliLiter(s) IV Continuous <Continuous>  dextrose 50% Injectable 12.5 Gram(s) IV Push once  dextrose 50% Injectable 25 Gram(s) IV Push once  dextrose 50% Injectable 25 Gram(s) IV Push once  enoxaparin Injectable 40 milliGRAM(s) SubCutaneous daily  famotidine    Tablet 20 milliGRAM(s) Oral daily  FLUoxetine 20 milliGRAM(s) Oral daily  gabapentin 300 milliGRAM(s) Oral at bedtime  glucagon  Injectable 1 milliGRAM(s) IntraMuscular once PRN  hydrALAZINE 50 milliGRAM(s) Oral daily  insulin glargine Injectable (LANTUS) 20 Unit(s) SubCutaneous at bedtime  insulin lispro (HumaLOG) corrective regimen sliding scale   SubCutaneous at bedtime  insulin lispro (HumaLOG) corrective regimen sliding scale   SubCutaneous before breakfast  lactulose Syrup 10 Gram(s) Oral daily PRN  lidocaine   Patch 1 Patch Transdermal daily  melatonin 3 milliGRAM(s) Oral at bedtime  metFORMIN 1000 milliGRAM(s) Oral two times a day with meals  metoprolol tartrate 25 milliGRAM(s) Oral every 12 hours  polyethylene glycol 3350 17 Gram(s) Oral daily  saline laxative (FLEET) Rectal Enema 1 Enema Rectal once PRN  senna 2 Tablet(s) Oral at bedtime  sodium chloride 0.65% Nasal 1 Spray(s) Both Nostrils four times a day      T(C): 36.6 (05-23-20 @ 08:07), Max: 36.9 (05-22-20 @ 16:48)  HR: 80 (05-23-20 @ 08:07) (63 - 80)  BP: 102/78 (05-23-20 @ 08:07) (102/78 - 143/72)  RR: 14 (05-23-20 @ 08:07) (14 - 15)  SpO2: 98% (05-23-20 @ 08:07) (96% - 98%)    In NAD  HEENT- EOMI  Heart- RRR, S1S2  Lungs- CTA bl.  Abd- + BS, NT, ND, normoactive bs  Ext- No calf pain  Neuro- Exam unchanged          Imp: Patient with diagnosis of covid debility and left frontal and parietal CVA admitted for comprehensive acute rehabilitation.    Plan:  - Continue therapies  - DVT prophylaxis  - Skin- Turn q2h, check skin daily  - Continue current medications; patient medically stable.   - Patient is stable to continue current rehabilitation program.   - Patient started on miralax and is now reporting normal bowel movements.  Will continue to monitor.

## 2020-05-25 NOTE — PROGRESS NOTE ADULT - BREASTS
not examined
No masses; no nipple discharge
No masses; no nipple discharge
not examined

## 2020-05-25 NOTE — PROGRESS NOTE ADULT - NS NEC GEN PE MLT EXAM PC
No bruits; no thyromegaly or nodules
not examined
not examined
No bruits; no thyromegaly or nodules

## 2020-05-26 PROCEDURE — 99232 SBSQ HOSP IP/OBS MODERATE 35: CPT

## 2020-05-26 PROCEDURE — 70450 CT HEAD/BRAIN W/O DYE: CPT | Mod: 26

## 2020-05-26 RX ORDER — SIMETHICONE 80 MG/1
80 TABLET, CHEWABLE ORAL EVERY 8 HOURS
Refills: 0 | Status: DISCONTINUED | OUTPATIENT
Start: 2020-05-26 | End: 2020-05-29

## 2020-05-26 RX ADMIN — Medication 81 MILLIGRAM(S): at 13:03

## 2020-05-26 RX ADMIN — Medication 1 SPRAY(S): at 18:11

## 2020-05-26 RX ADMIN — AMLODIPINE BESYLATE 5 MILLIGRAM(S): 2.5 TABLET ORAL at 06:52

## 2020-05-26 RX ADMIN — NYSTATIN CREAM 1 APPLICATION(S): 100000 CREAM TOPICAL at 06:53

## 2020-05-26 RX ADMIN — Medication 1 SPRAY(S): at 06:53

## 2020-05-26 RX ADMIN — SIMETHICONE 80 MILLIGRAM(S): 80 TABLET, CHEWABLE ORAL at 13:41

## 2020-05-26 RX ADMIN — Medication 1 APPLICATION(S): at 06:53

## 2020-05-26 RX ADMIN — NYSTATIN CREAM 1 APPLICATION(S): 100000 CREAM TOPICAL at 16:13

## 2020-05-26 RX ADMIN — ENOXAPARIN SODIUM 40 MILLIGRAM(S): 100 INJECTION SUBCUTANEOUS at 13:03

## 2020-05-26 RX ADMIN — Medication 25 MILLIGRAM(S): at 06:53

## 2020-05-26 RX ADMIN — GABAPENTIN 300 MILLIGRAM(S): 400 CAPSULE ORAL at 22:50

## 2020-05-26 RX ADMIN — METFORMIN HYDROCHLORIDE 1000 MILLIGRAM(S): 850 TABLET ORAL at 08:19

## 2020-05-26 RX ADMIN — Medication 3 MILLIGRAM(S): at 22:50

## 2020-05-26 RX ADMIN — SENNA PLUS 2 TABLET(S): 8.6 TABLET ORAL at 22:50

## 2020-05-26 RX ADMIN — Medication 650 MILLIGRAM(S): at 22:56

## 2020-05-26 RX ADMIN — Medication 20 MILLIGRAM(S): at 13:03

## 2020-05-26 RX ADMIN — Medication 50 MILLIGRAM(S): at 06:53

## 2020-05-26 RX ADMIN — LIDOCAINE 1 PATCH: 4 CREAM TOPICAL at 19:31

## 2020-05-26 RX ADMIN — FAMOTIDINE 20 MILLIGRAM(S): 10 INJECTION INTRAVENOUS at 13:03

## 2020-05-26 RX ADMIN — ATORVASTATIN CALCIUM 10 MILLIGRAM(S): 80 TABLET, FILM COATED ORAL at 22:50

## 2020-05-26 RX ADMIN — INSULIN GLARGINE 20 UNIT(S): 100 INJECTION, SOLUTION SUBCUTANEOUS at 22:49

## 2020-05-26 RX ADMIN — Medication 1 SPRAY(S): at 13:04

## 2020-05-26 RX ADMIN — METFORMIN HYDROCHLORIDE 1000 MILLIGRAM(S): 850 TABLET ORAL at 18:09

## 2020-05-26 RX ADMIN — SIMETHICONE 80 MILLIGRAM(S): 80 TABLET, CHEWABLE ORAL at 22:50

## 2020-05-26 RX ADMIN — LIDOCAINE 1 PATCH: 4 CREAM TOPICAL at 13:03

## 2020-05-26 RX ADMIN — Medication 1 APPLICATION(S): at 16:13

## 2020-05-26 RX ADMIN — Medication 25 MILLIGRAM(S): at 18:10

## 2020-05-26 NOTE — PROGRESS NOTE ADULT - ASSESSMENT
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 stable 2.5 cm L arachnoid cyst.  HEAD CT 5/20: Small left frontal subarachnoid hemorrhage is decreased. Multiple chronic infarcts.  - Was on ASA, lovenox therapeutic dose as may be hypercoagulable due to COVID. Neuro f/u 5/20 appreciated, cleared to resume ASA 81 mg daily, Restarted PPX lovenox 5/20 after stable Head CT  -repeat Head CT 5/22: stable left frontal lobe hemorrhage  - cont prozac for motor recovery. 20 mg daily   -neuropsychology supportive counseling  -right SAFO as outpatient. Continue ACe wrapping for foot drop for now. Discussed with patient via       # r/o coagulopathy due to spontaneous SAH and RLE hematoma:  -Heme/onc consulted for ?hypo-coagulopathy:. 5/18 consult read and appreciated:  ·	Coagulopathy: agreed with holding anticoagulation, stroke likely secondary to COVID 19+  ·	Bleeding diathesis: Will check factor V,  VII, VIII, IX, XII, XIII. fibrinogen, coags, VWD.   ·	 Head CT improved, no new areas bleed, and with multiple infarcts, as recommended by hospitalist. Vit K held as agreed by heme and hospitalist       #RLE pain: nearly resolved  - Doppler negative DVT 4/28, 5/7  - 5/8 CT RLE with finding of hematoma, vascular consulted would monitor for now, can continue dvt ppx given risk of COVID - appears improved on exam   -5/8 LE ANGEL performed - with +moderate PAD  -Xray right knee 4/30 negative for acute fracture  Xray right ankle 5/12 negative for fracture +calcaneal spur  -exam benign, calf discomfort likely due to developing tone/spasticity. Stretch, bracing, discussed with patient 5/26    #Acute Hypoxic Respiratory Failure secondary to COVID-19 PNA 4/8  - Hydroxychloroquine not given, pt with prolonged QTc  - EKG - QTc: 500 (4/14), incomplete RBBB  - Incentive spirometry, robitussin DM, ocean spray  -patient tolerating therapies without O2  -COVID reswab NEGATIVE 5/13    #UTI: resolved  -completed macrobid 5/15  -urine cultures - +E coli >100k macrobid sensitive  -mild leukocytosis 11.72 5/14. Afebrile, symptoms improved  CBC 5/18 - 10.27 stable --> 8.89 5/21--> 7.55 5/25    #BRADY 2/2 to dehydration vs UTI - resolved  - encourage po fluids . BUn /Cr 14/0.83 5/21  #HTN  - Lopressor 25 q12, amlodipine 5 qd, hydralazine 50mg qd  controlled 5/26    #type II Mobitz block, known RBBB  - s/p Medtronic Micra AV+ leadless pacemaker  - outpt EP followup    #T2DM.  - SSI ACHS  -diabetic educator consult appreciated  continue metformin 100 mg BID  -lantus 20 units qhs  controlled 5/26 102-157    #HLD  - cont simvastatin 20 qhs    #insomnia  - melatonin 3    #Pain Mgmt   - Tylenol PRN    #GI/Bowel Mgmt   - Continent  - pepcid    #/Bladder Mgmt   - Continent, PVR negative    #FEN   - Diet - Regular + Thins  [[facility]]  DASH/TLC    #- DVT PPX  - on hold for bleed.     #Case discussed in IDT rounds 5/20:  -patient requires min assist toileting, CG/min assist transfers, ambulation  feet RW, total assist stairs,  -min assist bADLs, supervision, improved expression and word finding, min assist shower transfers, CG ambulation  -[facility] dc home this week pending caregiver training    #LABS  caregiver training

## 2020-05-26 NOTE — PROGRESS NOTE ADULT - SUBJECTIVE AND OBJECTIVE BOX
Patient is a 69y old  Female who presents with a chief complaint of left frontal parietal CVA with right HP and aphasia, COVID+ 4/8/20 (25 May 2020 13:08)      HPI:  SHIREEN CASH is a 70yo female RH dominant with PMH of HTN, HLD, T2DM, hx CVA, GERD, known RBBB presented to [facility] ED on 4/8/20 with complaints of fever, chills, cough, chest pain, SOB for 5 days. Per patient, her  tested +COVID at an outpatient testing facility a few days prior, patient herself had tested negative at the time. They continued to share the same living space. ED workup showed +COVID and Mobitz type II block. EP was consulted and patient is s/p Medtronic Micra AV+ leadless pacemaker implantation via right femoral vein on 4/10/20. Hydroxychloroquine presumably not given due to prolonged QTc.     On 4/16/20, patient developed acute aphasia and right sided weakness. CT head showed an acute LEFT frontal/parietal CVA. CTA head/neck showed LEFT ICA stenosis but no occlusion. tPA was not administered due to timing and no penumbra. Placed on full dose lovenox as thoguht to be hypercoagulable secondary to COVID. Patient was cleared for regular consistency solids, thin liquids. Transferred to [facility] for acute inpatient rehab services 4/23/20. (23 Apr 2020 14:26)      PAST MEDICAL & SURGICAL HISTORY:  H/O gastroesophageal reflux (GERD)  HTN (hypertension)  DM (diabetes mellitus)  History of benign brain tumor      MEDICATIONS  (STANDING):  amLODIPine   Tablet 5 milliGRAM(s) Oral daily  ammonium lactate 12% Lotion 1 Application(s) Topical two times a day  aspirin enteric coated 81 milliGRAM(s) Oral daily  atorvastatin 10 milliGRAM(s) Oral at bedtime  dextrose 5%. 1000 milliLiter(s) (50 mL/Hr) IV Continuous <Continuous>  dextrose 50% Injectable 12.5 Gram(s) IV Push once  dextrose 50% Injectable 25 Gram(s) IV Push once  dextrose 50% Injectable 25 Gram(s) IV Push once  enoxaparin Injectable 40 milliGRAM(s) SubCutaneous daily  famotidine    Tablet 20 milliGRAM(s) Oral daily  FLUoxetine 20 milliGRAM(s) Oral daily  gabapentin 300 milliGRAM(s) Oral at bedtime  hydrALAZINE 50 milliGRAM(s) Oral daily  insulin glargine Injectable (LANTUS) 20 Unit(s) SubCutaneous at bedtime  insulin lispro (HumaLOG) corrective regimen sliding scale   SubCutaneous at bedtime  insulin lispro (HumaLOG) corrective regimen sliding scale   SubCutaneous before breakfast  lidocaine   Patch 1 Patch Transdermal daily  melatonin 3 milliGRAM(s) Oral at bedtime  metFORMIN 1000 milliGRAM(s) Oral two times a day with meals  metoprolol tartrate 25 milliGRAM(s) Oral every 12 hours  nystatin Powder 1 Application(s) Topical two times a day  polyethylene glycol 3350 17 Gram(s) Oral daily  senna 2 Tablet(s) Oral at bedtime  simethicone 80 milliGRAM(s) Chew every 8 hours  sodium chloride 0.65% Nasal 1 Spray(s) Both Nostrils four times a day    MEDICATIONS  (PRN):  acetaminophen   Tablet .. 650 milliGRAM(s) Oral every 6 hours PRN Temp greater or equal to 38C (100.4F), Mild Pain (1 - 3)  benzonatate 100 milliGRAM(s) Oral every 8 hours PRN Cough  dextrose 40% Gel 15 Gram(s) Oral once PRN Blood Glucose LESS THAN 70 milliGRAM(s)/deciliter  glucagon  Injectable 1 milliGRAM(s) IntraMuscular once PRN Glucose LESS THAN 70 milligrams/deciliter  lactulose Syrup 10 Gram(s) Oral daily PRN constipation  saline laxative (FLEET) Rectal Enema 1 Enema Rectal once PRN if no BM by this evening 5/13/20      Allergies    No Known Allergies    Intolerances          VITALS  69y  Vital Signs Last 24 Hrs  T(C): 36.9 (26 May 2020 08:11), Max: 36.9 (26 May 2020 08:11)  T(F): 98.5 (26 May 2020 08:11), Max: 98.5 (26 May 2020 08:11)  HR: 71 (26 May 2020 08:11) (71 - 84)  BP: 119/65 (26 May 2020 08:11) (106/68 - 145/76)  BP(mean): --  RR: 17 (26 May 2020 08:11) (16 - 18)  SpO2: 92% (26 May 2020 08:11) (92% - 99%)  Daily     Daily         RECENT LABS:                          10.7   7.95  )-----------( 402      ( 25 May 2020 05:30 )             32.6     05-25    136  |  100  |  13  ----------------------------<  104<H>  4.1   |  25  |  0.72    Ca    9.3      25 May 2020 05:30    TPro  6.9  /  Alb  3.6  /  TBili  0.4  /  DBili  x   /  AST  15  /  ALT  15  /  AlkPhos  40  05-25    LIVER FUNCTIONS - ( 25 May 2020 05:30 )  Alb: 3.6 g/dL / Pro: 6.9 g/dL / ALK PHOS: 40 U/L / ALT: 15 U/L / AST: 15 U/L / GGT: x               EXAM:  CT BRAIN      PROCEDURE DATE:  05/22/2020        INTERPRETATION:  CLINICAL INFORMATION: f/u SAH. follow up bleed. ADMDIAG1: I63.9 CEREBRAL INFARCTION, UNSPECIFIED/.    TECHNIQUE: Sequential axial images were obtained from the vertex to the skull base without intravenous contrast. Coronal and sagittal reformations were obtained.     COMPARISON: Prior CT dated 5/20/2020.    FINDINGS:    Unchanged hyperdensity in the left frontal lobe suggesting hemorrhage. Chronic infarcts in the left frontal and parietal lobes and right cerebellum. Arachnoid cyst along the left frontal temporal convexity. No significant midline shift, hydrocephalus, or effacement of basal cisterns. There are areas of hypodensity in the bilateral hemispheric white matter suggesting chronic white matter microvascular ischemic change. The ventricles and sulci are normal in size for patient's age.     There is no displaced calvarial fracture. The visualized orbits are within normal limits. The visualized portions of the paranasal sinuses are well aerated. The mastoid air cells are well aerated.      IMPRESSION: Unchanged hyperdensity in the left frontal lobe suggesting hemorrhage.                  DHARMESH RODAS M.D., ATTENDING RADIOLOGIST  This document has been electronically signed. May 22 2020  4:21PM                    CAPILLARY BLOOD GLUCOSE      POCT Blood Glucose.: 102 mg/dL (26 May 2020 07:47)  POCT Blood Glucose.: 151 mg/dL (25 May 2020 22:22)      Review of Systems:   · Additional ROS	Patient seen with SLP and assistance of language line  in Lithuanian at 9:45 AM. Patient in good spirits, aphasic but improved word finding, tries to self correct +paraphasic errors  Mood improved, no lablity. hopes to go home by end of week . Denies any pain in RLE but SLP reports had some complaints of calf pain	  		    Physical Exam:    Reference Recent Physical Exam:  · In accordance with current standards limiting patient contact please refer to the recent:	Inpatient Physical Exam	    · Constitutional	detailed exam	  · Constitutional Details	no distress. alert mood stable, compliant with exam	  · Respiratory	detailed exam	  · Respiratory Details	normal; airway patent; breath sounds equal; good effort no R/r/W no cough or respiratory distress	  · Cardiovascular	detailed exam	  · Cardiovascular Details	regular rate and rhythm	  · Gastrointestinal	detailed exam	  · GI Normal	normal; soft; nontender; no distention; bowel sounds normal	  · Extremities	detailed exam	  · Extremities Comments	RLE : calves soft, no further ecchymoses, no swelling no redness, no cord palpable +plantar flexion tone PROM ankle to neutral no clonus no opedal edema

## 2020-05-27 PROCEDURE — 99233 SBSQ HOSP IP/OBS HIGH 50: CPT

## 2020-05-27 PROCEDURE — 99232 SBSQ HOSP IP/OBS MODERATE 35: CPT

## 2020-05-27 RX ADMIN — LIDOCAINE 1 PATCH: 4 CREAM TOPICAL at 12:42

## 2020-05-27 RX ADMIN — Medication 1 SPRAY(S): at 12:42

## 2020-05-27 RX ADMIN — Medication 81 MILLIGRAM(S): at 12:41

## 2020-05-27 RX ADMIN — Medication 1 SPRAY(S): at 17:39

## 2020-05-27 RX ADMIN — Medication 25 MILLIGRAM(S): at 05:41

## 2020-05-27 RX ADMIN — INSULIN GLARGINE 20 UNIT(S): 100 INJECTION, SOLUTION SUBCUTANEOUS at 22:36

## 2020-05-27 RX ADMIN — Medication 1 APPLICATION(S): at 16:35

## 2020-05-27 RX ADMIN — SIMETHICONE 80 MILLIGRAM(S): 80 TABLET, CHEWABLE ORAL at 22:38

## 2020-05-27 RX ADMIN — Medication 1 SPRAY(S): at 00:27

## 2020-05-27 RX ADMIN — ENOXAPARIN SODIUM 40 MILLIGRAM(S): 100 INJECTION SUBCUTANEOUS at 12:41

## 2020-05-27 RX ADMIN — AMLODIPINE BESYLATE 5 MILLIGRAM(S): 2.5 TABLET ORAL at 05:41

## 2020-05-27 RX ADMIN — Medication 1 SPRAY(S): at 05:40

## 2020-05-27 RX ADMIN — GABAPENTIN 300 MILLIGRAM(S): 400 CAPSULE ORAL at 22:36

## 2020-05-27 RX ADMIN — METFORMIN HYDROCHLORIDE 1000 MILLIGRAM(S): 850 TABLET ORAL at 17:39

## 2020-05-27 RX ADMIN — LIDOCAINE 1 PATCH: 4 CREAM TOPICAL at 19:43

## 2020-05-27 RX ADMIN — SIMETHICONE 80 MILLIGRAM(S): 80 TABLET, CHEWABLE ORAL at 14:37

## 2020-05-27 RX ADMIN — METFORMIN HYDROCHLORIDE 1000 MILLIGRAM(S): 850 TABLET ORAL at 08:26

## 2020-05-27 RX ADMIN — Medication 25 MILLIGRAM(S): at 17:39

## 2020-05-27 RX ADMIN — Medication 3 MILLIGRAM(S): at 22:38

## 2020-05-27 RX ADMIN — Medication 1 APPLICATION(S): at 05:41

## 2020-05-27 RX ADMIN — ATORVASTATIN CALCIUM 10 MILLIGRAM(S): 80 TABLET, FILM COATED ORAL at 22:36

## 2020-05-27 RX ADMIN — NYSTATIN CREAM 1 APPLICATION(S): 100000 CREAM TOPICAL at 16:36

## 2020-05-27 RX ADMIN — NYSTATIN CREAM 1 APPLICATION(S): 100000 CREAM TOPICAL at 05:40

## 2020-05-27 RX ADMIN — SIMETHICONE 80 MILLIGRAM(S): 80 TABLET, CHEWABLE ORAL at 05:40

## 2020-05-27 RX ADMIN — SENNA PLUS 2 TABLET(S): 8.6 TABLET ORAL at 22:38

## 2020-05-27 RX ADMIN — LIDOCAINE 1 PATCH: 4 CREAM TOPICAL at 00:28

## 2020-05-27 RX ADMIN — FAMOTIDINE 20 MILLIGRAM(S): 10 INJECTION INTRAVENOUS at 12:41

## 2020-05-27 RX ADMIN — Medication 20 MILLIGRAM(S): at 12:41

## 2020-05-27 RX ADMIN — Medication 50 MILLIGRAM(S): at 05:41

## 2020-05-27 NOTE — CHART NOTE - NSCHARTNOTEFT_GEN_A_CORE
Nutrition Follow Up Note  Hospital Course   (Per Electronic Medical Record):     Source:  Patient [X]  Medical Record [X]      Diet:   Consistent Carbohydrate Low Sodium Diet w/ Thin Liquids  Tolerates Diet Well  No Chewing/Swallowing Difficulties  No Recent Vomiting, Diarrhea or Constipation & Some Recent Nausea  Consumes 50-95% of Meals (as Per Documentation) - Varied Intake @Meals (as Per Pt)  Education Provided on Proper Nutrition   Declines Nutrition Supplementation  Obtained Food Preferences from Patient     Enteral/Parenteral Nutrition: Not Applicable    Current Weight: 169.5lb on 5/24  Obtain Weights Weekly  Weights Currently Stable @This Time    Pertinent Medications: MEDICATIONS  (STANDING):  amLODIPine   Tablet 5 milliGRAM(s) Oral daily  ammonium lactate 12% Lotion 1 Application(s) Topical two times a day  aspirin enteric coated 81 milliGRAM(s) Oral daily  atorvastatin 10 milliGRAM(s) Oral at bedtime  dextrose 5%. 1000 milliLiter(s) (50 mL/Hr) IV Continuous <Continuous>  dextrose 50% Injectable 12.5 Gram(s) IV Push once  dextrose 50% Injectable 25 Gram(s) IV Push once  dextrose 50% Injectable 25 Gram(s) IV Push once  enoxaparin Injectable 40 milliGRAM(s) SubCutaneous daily  famotidine    Tablet 20 milliGRAM(s) Oral daily  FLUoxetine 20 milliGRAM(s) Oral daily  gabapentin 300 milliGRAM(s) Oral at bedtime  hydrALAZINE 50 milliGRAM(s) Oral daily  insulin glargine Injectable (LANTUS) 20 Unit(s) SubCutaneous at bedtime  insulin lispro (HumaLOG) corrective regimen sliding scale   SubCutaneous at bedtime  insulin lispro (HumaLOG) corrective regimen sliding scale   SubCutaneous before breakfast  lidocaine   Patch 1 Patch Transdermal daily  melatonin 3 milliGRAM(s) Oral at bedtime  metFORMIN 1000 milliGRAM(s) Oral two times a day with meals  metoprolol tartrate 25 milliGRAM(s) Oral every 12 hours  nystatin Powder 1 Application(s) Topical two times a day  polyethylene glycol 3350 17 Gram(s) Oral daily  senna 2 Tablet(s) Oral at bedtime  simethicone 80 milliGRAM(s) Chew every 8 hours  sodium chloride 0.65% Nasal 1 Spray(s) Both Nostrils four times a day    MEDICATIONS  (PRN):  acetaminophen   Tablet .. 650 milliGRAM(s) Oral every 6 hours PRN Temp greater or equal to 38C (100.4F), Mild Pain (1 - 3)  benzonatate 100 milliGRAM(s) Oral every 8 hours PRN Cough  dextrose 40% Gel 15 Gram(s) Oral once PRN Blood Glucose LESS THAN 70 milliGRAM(s)/deciliter  glucagon  Injectable 1 milliGRAM(s) IntraMuscular once PRN Glucose LESS THAN 70 milligrams/deciliter  lactulose Syrup 10 Gram(s) Oral daily PRN constipation  saline laxative (FLEET) Rectal Enema 1 Enema Rectal once PRN if no BM by this evening 5/13/20    Pertinent Labs:  05-25 Na136 mmol/L Glu 104 mg/dL<H> K+ 4.1 mmol/L Cr  0.72 mg/dL BUN 13 mg/dL 05-25 Alb 3.6 g/dL    POCT (over Last 3 Days) - Ranging from 102-157    Skin: No Pressure Ulcers     Edema: None Noted     Last Bowel Movement: on 5/26    Estimated Needs:   [X] No Change Since Previous Assessment    Previous Nutrition Diagnosis:   Inadequate Oral Intake  Altered Nutrition Related Lab    Nutrition Diagnosis is [X] Ongoing - Education Provided on Proper Nutrition & Declines Nutrition Supplementation     New Nutrition Diagnosis: [X] Not Applicable    Interventions:   1. Education Provided on Proper Nutrition   2. Recommend Continue Nutrition Plan of Care     Monitoring & Evaluation:   [X] Weights   [X] PO Intake   [X] Skin Integrity   [X] Follow Up (Per Protocol)  [X] Tolerance to Diet Prescription   [X] Other: Labs & POCT    Registered Dietitian/Nutritionist Remains Available.  Oli Richards RDN    Pager # [phone]  Phone# [phone]

## 2020-05-27 NOTE — PROGRESS NOTE ADULT - ASSESSMENT
69 year-old F with hx of essential HTN, HLD, DM II, CVA, GERD, RBBB with COVID-19, medically deconditioned and Left frontal and parietal CVA, SAH, RLE hematoma.     Rehab: Functional and gait instability:  - C/w PT/OT per primary team     Neuro: Left frontal parietal CVA and SAH  - Repeat CT head 5-20 improving  - C/w Aspirin 81 mg PO QD   - C/w Fluoxetine 20 mg PO QD for mood    Pulm: COVID-19 acute hypoxic respiratory failure resolved   - saturating 99 % on RA now   - respiratory status stable    CVS: Essential Hypertension, Mobitz type II AV block s/p PPM, HLD  - BP controlled  - Hydralazine 50 mg PO QD? was discontinued, C/w Lopressor 25 mg PO BID, Amlodipine 5 mg PO QD for now  -  s/p PPM  - C/w Asa 91 mg PO QD   - C/w Lipitor 10 mg PO QHS     Endo: DM II  - FS controlled, inpatient target 150's   - metformin 1000mg q12, Lantus and ISS    Normocytic Anemia stable   - likely due to blood loss given hematoma    VTE proph: Lovenox 40 mg AUBQ

## 2020-05-27 NOTE — PROGRESS NOTE ADULT - SUBJECTIVE AND OBJECTIVE BOX
Hospitalist: Jatin Trotter DO    CHIEF COMPLAINT: Patient is a 69y old  female who presents with a chief complaint of left frontal parietal CVA with right HP and aphasia, COVID+ 4/8/20 (27 May 2020 13:41)    SUBJECTIVE / OVERNIGHT EVENTS: Patient seen and examined. No acute events overnight. Pain well controlled and patient without any complaints.    MEDICATIONS  (STANDING):  amLODIPine   Tablet 5 milliGRAM(s) Oral daily  ammonium lactate 12% Lotion 1 Application(s) Topical two times a day  aspirin enteric coated 81 milliGRAM(s) Oral daily  atorvastatin 10 milliGRAM(s) Oral at bedtime  dextrose 5%. 1000 milliLiter(s) (50 mL/Hr) IV Continuous <Continuous>  dextrose 50% Injectable 12.5 Gram(s) IV Push once  dextrose 50% Injectable 25 Gram(s) IV Push once  dextrose 50% Injectable 25 Gram(s) IV Push once  enoxaparin Injectable 40 milliGRAM(s) SubCutaneous daily  famotidine    Tablet 20 milliGRAM(s) Oral daily  FLUoxetine 20 milliGRAM(s) Oral daily  gabapentin 300 milliGRAM(s) Oral at bedtime  hydrALAZINE 50 milliGRAM(s) Oral daily  insulin glargine Injectable (LANTUS) 20 Unit(s) SubCutaneous at bedtime  insulin lispro (HumaLOG) corrective regimen sliding scale   SubCutaneous at bedtime  insulin lispro (HumaLOG) corrective regimen sliding scale   SubCutaneous before breakfast  lidocaine   Patch 1 Patch Transdermal daily  melatonin 3 milliGRAM(s) Oral at bedtime  metFORMIN 1000 milliGRAM(s) Oral two times a day with meals  metoprolol tartrate 25 milliGRAM(s) Oral every 12 hours  nystatin Powder 1 Application(s) Topical two times a day  polyethylene glycol 3350 17 Gram(s) Oral daily  senna 2 Tablet(s) Oral at bedtime  simethicone 80 milliGRAM(s) Chew every 8 hours  sodium chloride 0.65% Nasal 1 Spray(s) Both Nostrils four times a day    MEDICATIONS  (PRN):  acetaminophen   Tablet .. 650 milliGRAM(s) Oral every 6 hours PRN Temp greater or equal to 38C (100.4F), Mild Pain (1 - 3)  benzonatate 100 milliGRAM(s) Oral every 8 hours PRN Cough  dextrose 40% Gel 15 Gram(s) Oral once PRN Blood Glucose LESS THAN 70 milliGRAM(s)/deciliter  glucagon  Injectable 1 milliGRAM(s) IntraMuscular once PRN Glucose LESS THAN 70 milligrams/deciliter  lactulose Syrup 10 Gram(s) Oral daily PRN constipation  saline laxative (FLEET) Rectal Enema 1 Enema Rectal once PRN if no BM by this evening 5/13/20      VITALS:  T(F): 98.4 (05-27-20 @ 07:52), Max: 98.4 (05-27-20 @ 07:52)  HR: 69 (05-27-20 @ 07:52) (64 - 82)  BP: 107/61 (05-27-20 @ 07:52) (107/61 - 134/70)  RR: 17 (05-27-20 @ 07:52) (16 - 17)  SpO2: 95% (05-27-20 @ 07:52)      REVIEW OF SYSTEMS:  For ROV please refer back to H&P     PHYSICAL EXAM:  In NAD  HEENT- EOMI  Heart- RRR, S1S2  Lungs- CTA bl.  Abd- + BS, NT, ND, normoactive bs  Ext- No calf pain  Neuro- Exam unchanged     CAPILLARY BLOOD GLUCOSE      POCT Blood Glucose.: 103 mg/dL (27 May 2020 07:42)  POCT Blood Glucose.: 119 mg/dL (26 May 2020 22:29)    MICROBIOLOGY:  Urine Culture - 48 Hour Hold (05.08.20 @ 09:15)    Culture Results:   >100,000 CFU/ml Escherichia coli    -  Trimethoprim/Sulfamethoxazole: R >2/38    -  Tobramycin: S <=4    -  Piperacillin/Tazobactam: S <=16    -  Tigecycline: S <=2    -  Nitrofurantoin: S <=32 Should not be used to treat pyelonephritis    -  Meropenem: S <=1    -  Levofloxacin: R >4    -  Imipenem: S <=1    -  Gentamicin: S <=4    -  Ciprofloxacin: R >2    -  Ceftriaxone: S <=1 Enterobacter, Citrobacter, and Serratia may develop resistance during prolonged therapy    Organism: Escherichia coli    Specimen Source: .Urine    -  Cefoxitin: S <=8    -  Cefepime: S <=4    -  Cefazolin: S <=8 (MIC_CL_COM_ENTERIC_CEFAZU) For uncomplicated UTI with K. pneumoniae, E. coli, or P. mirablis: RENNY <=16 is sensitive and RENNY >=32 is resistant. This also predicts results for oral agents cefaclor, cefdinir, cefpodoxime, cefprozil, cefuroxime axetil, cephalexin and locarbef for uncomplicated UTI. Note that some isolates may be susceptible to these agents while testing resistant to cefazolin.    -  Aztreonam: S <=4    -  Ampicillin: R >16 These ampicillin results predict results for amoxicillin    -  Ampicillin/Sulbactam: R >16/8 Enterobacter, Citrobacter, and Serratia may develop resistance during prolonged therapy (3-4 days)    -  Amikacin: S <=16    Method Type: RENNY    Organism Identification: Escherichia coli    RADIOLOGY & ADDITIONAL TESTS:    Imaging Personally Reviewed:    [X] Consultant(s) Notes Reviewed:  [X] Care Discussed with Consultants/Other Providers:

## 2020-05-27 NOTE — PROGRESS NOTE ADULT - SUBJECTIVE AND OBJECTIVE BOX
Patient is a 69y old  Female who presents with a chief complaint of left frontal parietal CVA with right HP and aphasia, COVID+ 4/8/20 (26 May 2020 10:10)      HPI:  SHIREEN CASH is a 70yo female RH dominant with PMH of HTN, HLD, T2DM, hx CVA, GERD, known RBBB presented to [facility] ED on 4/8/20 with complaints of fever, chills, cough, chest pain, SOB for 5 days. Per patient, her  tested +COVID at an outpatient testing facility a few days prior, patient herself had tested negative at the time. They continued to share the same living space. ED workup showed +COVID and Mobitz type II block. EP was consulted and patient is s/p Medtronic Micra AV+ leadless pacemaker implantation via right femoral vein on 4/10/20. Hydroxychloroquine presumably not given due to prolonged QTc.     On 4/16/20, patient developed acute aphasia and right sided weakness. CT head showed an acute LEFT frontal/parietal CVA. CTA head/neck showed LEFT ICA stenosis but no occlusion. tPA was not administered due to timing and no penumbra. Placed on full dose lovenox as thoguht to be hypercoagulable secondary to COVID. Patient was cleared for regular consistency solids, thin liquids. Transferred to [place] for acute inpatient rehab services 4/23/20. (23 Apr 2020 14:26)      PAST MEDICAL & SURGICAL HISTORY:  H/O gastroesophageal reflux (GERD)  HTN (hypertension)  DM (diabetes mellitus)  History of benign brain tumor      MEDICATIONS  (STANDING):  amLODIPine   Tablet 5 milliGRAM(s) Oral daily  ammonium lactate 12% Lotion 1 Application(s) Topical two times a day  aspirin enteric coated 81 milliGRAM(s) Oral daily  atorvastatin 10 milliGRAM(s) Oral at bedtime  dextrose 5%. 1000 milliLiter(s) (50 mL/Hr) IV Continuous <Continuous>  dextrose 50% Injectable 12.5 Gram(s) IV Push once  dextrose 50% Injectable 25 Gram(s) IV Push once  dextrose 50% Injectable 25 Gram(s) IV Push once  enoxaparin Injectable 40 milliGRAM(s) SubCutaneous daily  famotidine    Tablet 20 milliGRAM(s) Oral daily  FLUoxetine 20 milliGRAM(s) Oral daily  gabapentin 300 milliGRAM(s) Oral at bedtime  hydrALAZINE 50 milliGRAM(s) Oral daily  insulin glargine Injectable (LANTUS) 20 Unit(s) SubCutaneous at bedtime  insulin lispro (HumaLOG) corrective regimen sliding scale   SubCutaneous at bedtime  insulin lispro (HumaLOG) corrective regimen sliding scale   SubCutaneous before breakfast  lidocaine   Patch 1 Patch Transdermal daily  melatonin 3 milliGRAM(s) Oral at bedtime  metFORMIN 1000 milliGRAM(s) Oral two times a day with meals  metoprolol tartrate 25 milliGRAM(s) Oral every 12 hours  nystatin Powder 1 Application(s) Topical two times a day  polyethylene glycol 3350 17 Gram(s) Oral daily  senna 2 Tablet(s) Oral at bedtime  simethicone 80 milliGRAM(s) Chew every 8 hours  sodium chloride 0.65% Nasal 1 Spray(s) Both Nostrils four times a day    MEDICATIONS  (PRN):  acetaminophen   Tablet .. 650 milliGRAM(s) Oral every 6 hours PRN Temp greater or equal to 38C (100.4F), Mild Pain (1 - 3)  benzonatate 100 milliGRAM(s) Oral every 8 hours PRN Cough  dextrose 40% Gel 15 Gram(s) Oral once PRN Blood Glucose LESS THAN 70 milliGRAM(s)/deciliter  glucagon  Injectable 1 milliGRAM(s) IntraMuscular once PRN Glucose LESS THAN 70 milligrams/deciliter  lactulose Syrup 10 Gram(s) Oral daily PRN constipation  saline laxative (FLEET) Rectal Enema 1 Enema Rectal once PRN if no BM by this evening 5/13/20      Allergies    No Known Allergies    Intolerances          VITALS  69y  Vital Signs Last 24 Hrs  T(C): 36.9 (27 May 2020 07:52), Max: 36.9 (27 May 2020 07:52)  T(F): 98.4 (27 May 2020 07:52), Max: 98.4 (27 May 2020 07:52)  HR: 69 (27 May 2020 07:52) (64 - 82)  BP: 107/61 (27 May 2020 07:52) (107/61 - 134/70)  BP(mean): --  RR: 17 (27 May 2020 07:52) (16 - 17)  SpO2: 95% (27 May 2020 07:52) (95% - 97%)  Daily     Daily         RECENT LABS:                      CAPILLARY BLOOD GLUCOSE      POCT Blood Glucose.: 103 mg/dL (27 May 2020 07:42)  POCT Blood Glucose.: 119 mg/dL (26 May 2020 22:29)      Review of Systems:   · Additional ROS	Patient stable overnight. SW was able to contact famly friend and son, friend Saima is willing to assist patient on discharge. She has also made continued progress in therapy, derick in OT and PT, with some increased insight which at times leads to frustration with word finding deficits in SLP  To plan for in-person training and dc with caregiver support	  		    Physical Exam:    Reference Recent Physical Exam:  · In accordance with current standards limiting patient contact please refer to the recent:	Inpatient Physical Exam	    · Constitutional	detailed exam	  · Constitutional Details	no distress. alert mood stable, compliant with exam. Improved initiation speech and word finding, increased fluency perseverative on dc home Fri	  · Respiratory	detailed exam	  · Respiratory Details	normal; airway patent; breath sounds equal; good effort clear bilaterally	  · Cardiovascular	detailed exam	  · Cardiovascular Details	regular rate and rhythm	  · Gastrointestinal	detailed exam	  · GI Normal	normal; soft; nontender; no distention; bowel sounds normal	  · Extremities	detailed exam	  · Extremities Comments	no calf swelling or pedal edema +plantar flexion tone PROM ankle to neutral no clonus no pedal edema Patient is a 69y old  Female who presents with a chief complaint of left frontal parietal CVA with right HP and aphasia, COVID+ 4/8/20 (26 May 2020 10:10)      HPI:  SHIREEN CASH is a 68yo female RH dominant with PMH of HTN, HLD, T2DM, hx CVA, GERD, known RBBB presented to [facility] ED on 4/8/20 with complaints of fever, chills, cough, chest pain, SOB for 5 days. Per patient, her  tested +COVID at an outpatient testing facility a few days prior, patient herself had tested negative at the time. They continued to share the same living space. ED workup showed +COVID and Mobitz type II block. EP was consulted and patient is s/p Medtronic Micra AV+ leadless pacemaker implantation via right femoral vein on 4/10/20. Hydroxychloroquine presumably not given due to prolonged QTc.     On 4/16/20, patient developed acute aphasia and right sided weakness. CT head showed an acute LEFT frontal/parietal CVA. CTA head/neck showed LEFT ICA stenosis but no occlusion. tPA was not administered due to timing and no penumbra. Placed on full dose lovenox as thoguht to be hypercoagulable secondary to COVID. Patient was cleared for regular consistency solids, thin liquids. Transferred to [place] for acute inpatient rehab services 4/23/20. (23 Apr 2020 14:26)      PAST MEDICAL & SURGICAL HISTORY:  H/O gastroesophageal reflux (GERD)  HTN (hypertension)  DM (diabetes mellitus)  History of benign brain tumor      MEDICATIONS  (STANDING):  amLODIPine   Tablet 5 milliGRAM(s) Oral daily  ammonium lactate 12% Lotion 1 Application(s) Topical two times a day  aspirin enteric coated 81 milliGRAM(s) Oral daily  atorvastatin 10 milliGRAM(s) Oral at bedtime  dextrose 5%. 1000 milliLiter(s) (50 mL/Hr) IV Continuous <Continuous>  dextrose 50% Injectable 12.5 Gram(s) IV Push once  dextrose 50% Injectable 25 Gram(s) IV Push once  dextrose 50% Injectable 25 Gram(s) IV Push once  enoxaparin Injectable 40 milliGRAM(s) SubCutaneous daily  famotidine    Tablet 20 milliGRAM(s) Oral daily  FLUoxetine 20 milliGRAM(s) Oral daily  gabapentin 300 milliGRAM(s) Oral at bedtime  hydrALAZINE 50 milliGRAM(s) Oral daily  insulin glargine Injectable (LANTUS) 20 Unit(s) SubCutaneous at bedtime  insulin lispro (HumaLOG) corrective regimen sliding scale   SubCutaneous at bedtime  insulin lispro (HumaLOG) corrective regimen sliding scale   SubCutaneous before breakfast  lidocaine   Patch 1 Patch Transdermal daily  melatonin 3 milliGRAM(s) Oral at bedtime  metFORMIN 1000 milliGRAM(s) Oral two times a day with meals  metoprolol tartrate 25 milliGRAM(s) Oral every 12 hours  nystatin Powder 1 Application(s) Topical two times a day  polyethylene glycol 3350 17 Gram(s) Oral daily  senna 2 Tablet(s) Oral at bedtime  simethicone 80 milliGRAM(s) Chew every 8 hours  sodium chloride 0.65% Nasal 1 Spray(s) Both Nostrils four times a day    MEDICATIONS  (PRN):  acetaminophen   Tablet .. 650 milliGRAM(s) Oral every 6 hours PRN Temp greater or equal to 38C (100.4F), Mild Pain (1 - 3)  benzonatate 100 milliGRAM(s) Oral every 8 hours PRN Cough  dextrose 40% Gel 15 Gram(s) Oral once PRN Blood Glucose LESS THAN 70 milliGRAM(s)/deciliter  glucagon  Injectable 1 milliGRAM(s) IntraMuscular once PRN Glucose LESS THAN 70 milligrams/deciliter  lactulose Syrup 10 Gram(s) Oral daily PRN constipation  saline laxative (FLEET) Rectal Enema 1 Enema Rectal once PRN if no BM by this evening 5/13/20      Allergies    No Known Allergies    Intolerances          VITALS  69y  Vital Signs Last 24 Hrs  T(C): 36.9 (27 May 2020 07:52), Max: 36.9 (27 May 2020 07:52)  T(F): 98.4 (27 May 2020 07:52), Max: 98.4 (27 May 2020 07:52)  HR: 69 (27 May 2020 07:52) (64 - 82)  BP: 107/61 (27 May 2020 07:52) (107/61 - 134/70)  BP(mean): --  RR: 17 (27 May 2020 07:52) (16 - 17)  SpO2: 95% (27 May 2020 07:52) (95% - 97%)  Daily     Daily         RECENT LABS:                      CAPILLARY BLOOD GLUCOSE      POCT Blood Glucose.: 103 mg/dL (27 May 2020 07:42)  POCT Blood Glucose.: 119 mg/dL (26 May 2020 22:29)      Review of Systems:   · Additional ROS	Patient stable overnight. SW was able to contact famly friend and son, friend Saima is willing to assist patient on discharge. She has also made continued progress in therapy, derick in OT and PT, with some increased insight which at times leads to frustration with word finding deficits in SLP  To plan for in-person training and dc with caregiver support	  		    Physical Exam:    Reference Recent Physical Exam:  · In accordance with current standards limiting patient contact please refer to the recent:	Inpatient Physical Exam	    · Constitutional	detailed exam	  · Constitutional Details	no distress. alert mood stable, compliant with exam. Improved initiation speech and word finding, increased fluency perseverative on dc home Fri	  · Respiratory	detailed exam	  · Respiratory Details	normal; airway patent; breath sounds equal; good effort clear bilaterally	  · Cardiovascular	detailed exam	  · Cardiovascular Details	regular rate and rhythm	  · Gastrointestinal	detailed exam	  · GI Normal	normal; soft; nontender; no distention; bowel sounds normal	  · Extremities	detailed exam	  · Extremities Comments	no calf swelling or pedal edema +plantar flexion tone PROM ankle to neutral no clonus no pedal edema

## 2020-05-27 NOTE — PROGRESS NOTE ADULT - ASSESSMENT
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 stable 2.5 cm L arachnoid cyst.  HEAD CT 5/20: Small left frontal subarachnoid hemorrhage is decreased. Multiple chronic infarcts.  - Was on ASA, lovenox therapeutic dose as may be hypercoagulable due to COVID. Neuro f/u 5/20 appreciated, cleared to resume ASA 81 mg daily, Restarted PPX lovenox 5/20 after stable Head CT  -repeat Head CT 5/22: stable left frontal lobe hemorrhage  - cont prozac for motor recovery. 20 mg daily   -neuropsychology supportive counseling  -right SAFO as outpatient. Continue ACe wrapping for foot drop for now. Discussed with patient via       # r/o coagulopathy due to spontaneous SAH and RLE hematoma:  -Heme/onc consulted for ?hypo-coagulopathy:. 5/18 consult read and appreciated:  ·	Coagulopathy: agreed with holding anticoagulation, stroke likely secondary to COVID 19+  ·	Bleeding diathesis: Will check factor V,  VII, VIII, IX, XII, XIII. fibrinogen, coags, VWD.   ·	 Head CT improved, no new areas bleed, and with multiple infarcts, as recommended by hospitalist. Vit K held as agreed by heme and hospitalist       #RLE pain: nearly resolved  - Doppler negative DVT 4/28, 5/7  - 5/8 CT RLE with finding of hematoma, vascular consulted would monitor for now, can continue dvt ppx given risk of COVID - appears improved on exam   -5/8 LE ANGEL performed - with +moderate PAD  -Xray right knee 4/30 negative for acute fracture  Xray right ankle 5/12 negative for fracture +calcaneal spur  -exam benign, calf discomfort likely due to developing tone/spasticity. Stretch, bracing, discussed with patient 5/26    #Acute Hypoxic Respiratory Failure secondary to COVID-19 PNA 4/8  - Hydroxychloroquine not given, pt with prolonged QTc  - EKG - QTc: 500 (4/14), incomplete RBBB  - Incentive spirometry, robitussin DM, ocean spray  -patient tolerating therapies without O2  -COVID reswab NEGATIVE 5/13    #UTI: resolved  -completed macrobid 5/15  -urine cultures - +E coli >100k macrobid sensitive  -mild leukocytosis 11.72 5/14. Afebrile, symptoms improved  CBC 5/18 - 10.27 stable --> 8.89 5/21--> 7.55 5/25    #BRADY 2/2 to dehydration vs UTI - resolved  - encourage po fluids . BUn /Cr 14/0.83 5/21  #HTN  - Lopressor 25 q12, amlodipine 5 qd, hydralazine 50mg qd  controlled 5/26    #type II Mobitz block, known RBBB  - s/p Medtronic Micra AV+ leadless pacemaker  - outpt EP followup    #T2DM.  - SSI ACHS  -diabetic educator consult appreciated  continue metformin 100 mg BID  -lantus 20 units qhs  controlled 5/26 102-157    #HLD  - cont simvastatin 20 qhs    #insomnia  - melatonin 3    #Pain Mgmt   - Tylenol PRN    #GI/Bowel Mgmt   - Continent  - pepcid    #/Bladder Mgmt   - Continent, PVR negative    #FEN   - Diet - Regular + Thins  [CCHO]  DASH/TLC    #- DVT PPX  - on hold for bleed.     #Case discussed in IDT rounds 5/20:  -patient requires min assist toileting, CG/min assist transfers, ambulation  feet RW, total assist stairs,  -min assist bADLs, supervision, improved expression and word finding, min assist shower transfers, CG ambulation  -target dc home this week pending caregiver training    #LABS  caregiver training ASSESSMENT/PLAN  SHIREEN CASH is a 70yo Female with HTN, HLD, T2DM, hx CVA, GERD, known RBBB with COVID-19 debility and Left frontal and parietla CVA with residual right HP, foot drop, patricio inattention aphasia    #Left frontal/parietal CVA  - continue  Comprehensive Rehab Program of PT/OT/SLP  -Head CT 5/9: new Mild subarachnoid hemorrhage in the left frontal lobe stable on repeat imaging 5/10  -repeat CT head 5/12 - STABLE NO NEW BLEED  -Head CT, 5/15 - Left SAH and new righ tlateral perimesencephalic SAH. stable 2.5 cm L arachnoid cyst.  HEAD CT 5/20: Small left frontal subarachnoid hemorrhage is decreased. Multiple chronic infarcts.  -repeat Head CT 5/22: stable left frontal lobe hemorrhage  - Was on ASA, lovenox therapeutic dose as may be hypercoagulable due to COVID. Neuro appreciated, cleared to resume ASA 81 mg daily,,  PPX lovenox 5/20 after stable Head CT  - cont prozac for motor recovery. 20 mg daily   -right SAFO as outpatient. Continue ACe wrapping for foot drop for now. Discussed with patient via   -caregiver training and home PT, OT, SLP this week      # r/o coagulopathy due to spontaneous SAH and RLE hematoma:  -Heme/onc consulted for ?hypo-coagulopathy:. 5/18 consult read and appreciated:  ·	Coagulopathy: agreed with holding anticoagulation, stroke likely secondary to COVID 19+  ·	Bleeding diathesis: Will check factor V,  VII, VIII, IX, XII, XIII. fibrinogen, coags, VWD.   ·	 Head CT improved, no new areas bleed, and with multiple infarcts, as recommended by hospitalist. Vit K held as agreed by heme and hospitalist   -PCP f/u on dc    #RLE pain: nearly resolved  - Doppler negative DVT 4/28, 5/7  - 5/8 CT RLE with finding of hematoma, vascular consulted would monitor for now, can continue dvt ppx given risk of COVID - appears improved on exam   -5/8 LE ANGEL performed - with +moderate PAD  -Xray right knee 4/30 negative for acute fracture  Xray right ankle 5/12 negative for fracture +calcaneal spur  -exam benign, calf discomfort likely due to developing tone/spasticity. Stretch, bracing, discussed with patient 5/26    #Acute Hypoxic Respiratory Failure secondary to COVID-19 PNA 4/8  - Hydroxychloroquine not given, pt with prolonged QTc  - EKG - QTc: 500 (4/14), incomplete RBBB  - Incentive spirometry, robitussin DM, ocean spray  -patient tolerating therapies without O2  -COVID reswab NEGATIVE 5/13    #UTI: resolved  -completed macrobid 5/15  -urine cultures - +E coli >100k macrobid sensitive  -mild leukocytosis 11.72 5/14. Afebrile, symptoms improved  CBC 5/18 - 10.27 stable --> 8.89 5/21--> 7.55 5/25    #BRADY 2/2 to dehydration vs UTI - resolved  - encourage po fluids . BUn /Cr 14/0.83 5/21    #HTN  - Lopressor 25 q12, amlodipine 5 qd, hydralazine 50mg qd  controlled 5/27    #type II Mobitz block, known RBBB  - s/p Medtronic Micra AV+ leadless pacemaker  - outpt EP followup    #T2DM.  - SSI ACHS  -diabetic educator consult appreciated  continue metformin 100 mg BID  -lantus 20 units qhs  controlled 5/27  PCP f/u on dc    #HLD  - cont simvastatin 20 qhs    #insomnia  - melatonin 3    #Pain Mgmt   - Tylenol PRN    #GI/Bowel Mgmt   - Continent  - pepcid    #/Bladder Mgmt   - Continent, PVR negative    #FEN   - Diet - Regular + Thins  [[facility]HO]  DASH/TLC    #- DVT PPX  - on hold for bleed.     #Case discussed in IDT rounds 5/27:  -patient continues to make gains. CS transfers, CS bADLs, CG toilet transfers. Ambulates 150 feet with CG assist, negotiates 4 steps with min assist x 2. Has 2 steps, no HR at home  -goals: min assist bathing, supervision medications and money management  -for dc home 5/29 or 5/30 depending on scheduling of caregiver training with son and friend. home Pt, OT, SLP services and caregiver support in community    #LABS  caregiver training  AFO as outpatient

## 2020-05-28 LAB
ALBUMIN SERPL ELPH-MCNC: 4.5 G/DL — SIGNIFICANT CHANGE UP (ref 3.3–5)
ALP SERPL-CCNC: 46 U/L — SIGNIFICANT CHANGE UP (ref 40–120)
ALT FLD-CCNC: 19 U/L — SIGNIFICANT CHANGE UP (ref 10–45)
ANION GAP SERPL CALC-SCNC: 12 MMOL/L — SIGNIFICANT CHANGE UP (ref 5–17)
AST SERPL-CCNC: 22 U/L — SIGNIFICANT CHANGE UP (ref 10–40)
BASOPHILS # BLD AUTO: 0.07 K/UL — SIGNIFICANT CHANGE UP (ref 0–0.2)
BASOPHILS NFR BLD AUTO: 0.8 % — SIGNIFICANT CHANGE UP (ref 0–2)
BILIRUB SERPL-MCNC: 0.7 MG/DL — SIGNIFICANT CHANGE UP (ref 0.2–1.2)
BUN SERPL-MCNC: 16 MG/DL — SIGNIFICANT CHANGE UP (ref 7–23)
CALCIUM SERPL-MCNC: 9.8 MG/DL — SIGNIFICANT CHANGE UP (ref 8.4–10.5)
CHLORIDE SERPL-SCNC: 99 MMOL/L — SIGNIFICANT CHANGE UP (ref 96–108)
CO2 SERPL-SCNC: 27 MMOL/L — SIGNIFICANT CHANGE UP (ref 22–31)
CREAT SERPL-MCNC: 0.85 MG/DL — SIGNIFICANT CHANGE UP (ref 0.5–1.3)
EOSINOPHIL # BLD AUTO: 0.21 K/UL — SIGNIFICANT CHANGE UP (ref 0–0.5)
EOSINOPHIL NFR BLD AUTO: 2.4 % — SIGNIFICANT CHANGE UP (ref 0–6)
GLUCOSE SERPL-MCNC: 114 MG/DL — HIGH (ref 70–99)
HCT VFR BLD CALC: 38.2 % — SIGNIFICANT CHANGE UP (ref 34.5–45)
HGB BLD-MCNC: 12.4 G/DL — SIGNIFICANT CHANGE UP (ref 11.5–15.5)
IMM GRANULOCYTES NFR BLD AUTO: 0.6 % — SIGNIFICANT CHANGE UP (ref 0–1.5)
LYMPHOCYTES # BLD AUTO: 2.68 K/UL — SIGNIFICANT CHANGE UP (ref 1–3.3)
LYMPHOCYTES # BLD AUTO: 30.9 % — SIGNIFICANT CHANGE UP (ref 13–44)
MCHC RBC-ENTMCNC: 28.6 PG — SIGNIFICANT CHANGE UP (ref 27–34)
MCHC RBC-ENTMCNC: 32.5 GM/DL — SIGNIFICANT CHANGE UP (ref 32–36)
MCV RBC AUTO: 88 FL — SIGNIFICANT CHANGE UP (ref 80–100)
MONOCYTES # BLD AUTO: 0.74 K/UL — SIGNIFICANT CHANGE UP (ref 0–0.9)
MONOCYTES NFR BLD AUTO: 8.5 % — SIGNIFICANT CHANGE UP (ref 2–14)
NEUTROPHILS # BLD AUTO: 4.92 K/UL — SIGNIFICANT CHANGE UP (ref 1.8–7.4)
NEUTROPHILS NFR BLD AUTO: 56.8 % — SIGNIFICANT CHANGE UP (ref 43–77)
NRBC # BLD: 0 /100 WBCS — SIGNIFICANT CHANGE UP (ref 0–0)
PLATELET # BLD AUTO: 464 K/UL — HIGH (ref 150–400)
POTASSIUM SERPL-MCNC: 4.2 MMOL/L — SIGNIFICANT CHANGE UP (ref 3.5–5.3)
POTASSIUM SERPL-SCNC: 4.2 MMOL/L — SIGNIFICANT CHANGE UP (ref 3.5–5.3)
PROT SERPL-MCNC: 8.5 G/DL — HIGH (ref 6–8.3)
RBC # BLD: 4.34 M/UL — SIGNIFICANT CHANGE UP (ref 3.8–5.2)
RBC # FLD: 15.8 % — HIGH (ref 10.3–14.5)
SODIUM SERPL-SCNC: 138 MMOL/L — SIGNIFICANT CHANGE UP (ref 135–145)
VWF CBA/VWF AG PPP IA-RTO: SIGNIFICANT CHANGE UP
WBC # BLD: 8.67 K/UL — SIGNIFICANT CHANGE UP (ref 3.8–10.5)
WBC # FLD AUTO: 8.67 K/UL — SIGNIFICANT CHANGE UP (ref 3.8–10.5)

## 2020-05-28 PROCEDURE — 99232 SBSQ HOSP IP/OBS MODERATE 35: CPT

## 2020-05-28 PROCEDURE — 99233 SBSQ HOSP IP/OBS HIGH 50: CPT

## 2020-05-28 RX ORDER — SIMETHICONE 80 MG/1
1 TABLET, CHEWABLE ORAL
Qty: 0 | Refills: 0 | DISCHARGE
Start: 2020-05-28

## 2020-05-28 RX ORDER — ISOPROPYL ALCOHOL, BENZOCAINE .7; .06 ML/ML; ML/ML
1 SWAB TOPICAL
Qty: 100 | Refills: 1
Start: 2020-05-28 | End: 2020-07-16

## 2020-05-28 RX ORDER — INSULIN GLARGINE 100 [IU]/ML
20 INJECTION, SOLUTION SUBCUTANEOUS
Qty: 6 | Refills: 0
Start: 2020-05-28 | End: 2020-06-26

## 2020-05-28 RX ORDER — METFORMIN HYDROCHLORIDE 850 MG/1
1 TABLET ORAL
Qty: 60 | Refills: 0
Start: 2020-05-28 | End: 2020-06-26

## 2020-05-28 RX ADMIN — Medication 1 APPLICATION(S): at 05:25

## 2020-05-28 RX ADMIN — METFORMIN HYDROCHLORIDE 1000 MILLIGRAM(S): 850 TABLET ORAL at 17:26

## 2020-05-28 RX ADMIN — LIDOCAINE 1 PATCH: 4 CREAM TOPICAL at 23:00

## 2020-05-28 RX ADMIN — SENNA PLUS 2 TABLET(S): 8.6 TABLET ORAL at 23:00

## 2020-05-28 RX ADMIN — Medication 25 MILLIGRAM(S): at 05:25

## 2020-05-28 RX ADMIN — Medication 25 MILLIGRAM(S): at 17:26

## 2020-05-28 RX ADMIN — Medication 1 APPLICATION(S): at 17:27

## 2020-05-28 RX ADMIN — Medication 1 SPRAY(S): at 05:26

## 2020-05-28 RX ADMIN — NYSTATIN CREAM 1 APPLICATION(S): 100000 CREAM TOPICAL at 17:28

## 2020-05-28 RX ADMIN — Medication 1 SPRAY(S): at 11:40

## 2020-05-28 RX ADMIN — Medication 1 SPRAY(S): at 23:40

## 2020-05-28 RX ADMIN — SIMETHICONE 80 MILLIGRAM(S): 80 TABLET, CHEWABLE ORAL at 14:58

## 2020-05-28 RX ADMIN — METFORMIN HYDROCHLORIDE 1000 MILLIGRAM(S): 850 TABLET ORAL at 08:00

## 2020-05-28 RX ADMIN — LIDOCAINE 1 PATCH: 4 CREAM TOPICAL at 00:16

## 2020-05-28 RX ADMIN — INSULIN GLARGINE 20 UNIT(S): 100 INJECTION, SOLUTION SUBCUTANEOUS at 22:58

## 2020-05-28 RX ADMIN — Medication 81 MILLIGRAM(S): at 11:36

## 2020-05-28 RX ADMIN — AMLODIPINE BESYLATE 5 MILLIGRAM(S): 2.5 TABLET ORAL at 05:25

## 2020-05-28 RX ADMIN — ENOXAPARIN SODIUM 40 MILLIGRAM(S): 100 INJECTION SUBCUTANEOUS at 11:37

## 2020-05-28 RX ADMIN — GABAPENTIN 300 MILLIGRAM(S): 400 CAPSULE ORAL at 22:58

## 2020-05-28 RX ADMIN — SIMETHICONE 80 MILLIGRAM(S): 80 TABLET, CHEWABLE ORAL at 05:26

## 2020-05-28 RX ADMIN — Medication 3 MILLIGRAM(S): at 23:00

## 2020-05-28 RX ADMIN — LIDOCAINE 1 PATCH: 4 CREAM TOPICAL at 18:48

## 2020-05-28 RX ADMIN — LIDOCAINE 1 PATCH: 4 CREAM TOPICAL at 11:39

## 2020-05-28 RX ADMIN — FAMOTIDINE 20 MILLIGRAM(S): 10 INJECTION INTRAVENOUS at 11:37

## 2020-05-28 RX ADMIN — ATORVASTATIN CALCIUM 10 MILLIGRAM(S): 80 TABLET, FILM COATED ORAL at 22:57

## 2020-05-28 RX ADMIN — SIMETHICONE 80 MILLIGRAM(S): 80 TABLET, CHEWABLE ORAL at 23:00

## 2020-05-28 RX ADMIN — Medication 20 MILLIGRAM(S): at 11:37

## 2020-05-28 RX ADMIN — NYSTATIN CREAM 1 APPLICATION(S): 100000 CREAM TOPICAL at 05:25

## 2020-05-28 RX ADMIN — Medication 1 SPRAY(S): at 17:27

## 2020-05-28 NOTE — PROGRESS NOTE ADULT - ASSESSMENT
69 year-old F with hx of essential HTN, HLD, DM II, CVA, GERD, RBBB with COVID-19, medically deconditioned and Left frontal and parietal CVA, SAH, RLE hematoma.     Rehab: Functional and gait instability:  - C/w PT/OT per primary team     Neuro: Left frontal parietal CVA and SAH  - Repeat CT head 5-20 improving  - C/w Aspirin 81 mg PO QD   - C/w Fluoxetine 20 mg PO QD for mood    Pulm: COVID-19 acute hypoxic respiratory failure resolved   - saturating 99 % on RA now   - respiratory status stable    CVS: Essential Hypertension, Mobitz type II AV block s/p PPM, HLD  - BP controlled  - Hydralazine 50 mg PO QD? was discontinued, C/w Lopressor 25 mg PO BID, Amlodipine 5 mg PO QD for now  -  s/p PPM  - C/w Asa 91 mg PO QD   - C/w Lipitor 10 mg PO QHS     Endo: DM II  - FS controlled, inpatient target 150's   - metformin 1000mg q12, Lantus and ISS    Normocytic Anemia stable   - likely due to blood loss given hematoma    VTE proph: Lovenox 40 mg SUBQ

## 2020-05-28 NOTE — PROGRESS NOTE ADULT - RS GEN PE MLT RESP DETAILS PC
airway patent/breath sounds equal/normal/clear to auscultation bilaterally/good air movement/respirations non-labored

## 2020-05-28 NOTE — PROGRESS NOTE ADULT - ASSESSMENT
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 stable 2.5 cm L arachnoid cyst.  HEAD CT 5/20: Small left frontal subarachnoid hemorrhage is decreased. Multiple chronic infarcts.  -repeat Head CT 5/22: stable left frontal lobe hemorrhage, stable ct head 5/26	  - Was on ASA, lovenox therapeutic dose as may be hypercoagulable due to COVID. Neuro appreciated, cleared to resume ASA 81 mg daily,,  PPX lovenox 5/20 after stable Head CT  - cont prozac for motor recovery. 20 mg daily   -right SAFO as outpatient. Continue ACe wrapping for foot drop for now. Discussed with patient via   -caregiver training and home PT, OT, SLP this week      # r/o coagulopathy due to spontaneous SAH and RLE hematoma:  -Heme/onc consulted for ?hypo-coagulopathy:. 5/18 consult read and appreciated:  ·	Coagulopathy: agreed with holding anticoagulation, stroke likely secondary to COVID 19+  ·	Bleeding diathesis: Will check factor V,  VII, VIII, IX, XII, XIII. fibrinogen, coags, VWD.   ·	 Head CT improved, no new areas bleed, and with multiple infarcts, as recommended by hospitalist. Vit K held as agreed by heme and hospitalist   -PCP f/u on dc    #RLE pain: nearly resolved  - Doppler negative DVT 4/28, 5/7  - 5/8 CT RLE with finding of hematoma, vascular consulted would monitor for now, can continue dvt ppx given risk of COVID - appears improved on exam   -5/8 LE ANGEL performed - with +moderate PAD  -Xray right knee 4/30 negative for acute fracture  Xray right ankle 5/12 negative for fracture +calcaneal spur  -exam benign, calf discomfort likely due to developing tone/spasticity. Stretch, bracing, discussed with patient 5/26    #Acute Hypoxic Respiratory Failure secondary to COVID-19 PNA 4/8  - Hydroxychloroquine not given, pt with prolonged QTc  - EKG - QTc: 500 (4/14), incomplete RBBB  - Incentive spirometry, robitussin DM, ocean spray  -patient tolerating therapies without O2  -COVID reswab NEGATIVE 5/13    #UTI: resolved  -completed macrobid 5/15  -urine cultures - +E coli >100k macrobid sensitive  -mild leukocytosis 11.72 5/14. Afebrile, symptoms improved  CBC 5/18 - 10.27 stable --> 8.89 5/21--> 7.55 5/25    #BRADY 2/2 to dehydration vs UTI - resolved  - encourage po fluids . BUn /Cr 14/0.83 5/21    #HTN  - Lopressor 25 q12, amlodipine 5 qd, hydral dcd 5/27  controlled 5/28 (125/64 - 155/78)    #type II Mobitz block, known RBBB  - s/p Medtronic Micra AV+ leadless pacemaker  - outpt EP followup    #T2DM.  - SSI ACHS  -diabetic educator consult appreciated  continue metformin 100 mg BID  -lantus 20 units qhs  controlled 5/28  PCP f/u on dc    #HLD  - cont simvastatin 20 qhs    #insomnia  - melatonin 3    #Pain Mgmt   - Tylenol PRN    #GI/Bowel Mgmt   - Continent  - pepcid    #/Bladder Mgmt   - Continent, PVR negative    #FEN   - Diet - Regular + Thins  [CCHO]  DASH/TLC    #- DVT PPX  - lovenox    #Case discussed in IDT rounds 5/27:  -patient continues to make gains. CS transfers, CS bADLs, CG toilet transfers. Ambulates 150 feet with CG assist, negotiates 4 steps with min assist x 2. Has 2 steps, no HR at home  -goals: min assist bathing, supervision medications and money management  -for dc home 5/29 or 5/30 depending on scheduling of caregiver training with son and friend. 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 stable 2.5 cm L arachnoid cyst.  HEAD CT 5/20: Small left frontal subarachnoid hemorrhage is decreased. Multiple chronic infarcts.  -repeat Head CT 5/22: stable left frontal lobe hemorrhage  -Stable ct head 5/26, expected evolutionary changes  - Cleared to resume ASA 81 mg daily,,  PPX lovenox 5/20 after stable Head CT by neuro  - cont prozac for motor recovery. 20 mg daily   -right SAFO as outpatient  -caregiver training taking place today 5/28      # r/o coagulopathy due to spontaneous SAH and RLE hematoma:  -Heme/onc consulted for ?hypo-coagulopathy:. 5/18 consult read and appreciated:  ·	 stroke likely secondary to COVID 19+  ·	 Head CT improved, no new areas bleed, and with multiple infarcts, as recommended by hospitalist. Vit K held as agreed by heme and hospitalist   -PCP f/u on dc    #RLE pain:  resolved  - Doppler negative DVT 4/28, 5/7  - 5/8 CT RLE with finding of hematoma, vascular consulted would monitor for now, can continue dvt ppx given risk of COVID - appears improved on exam   -5/8 LE ANGEL performed - with +moderate PAD  -Xray right knee 4/30 negative for acute fracture  Xray right ankle 5/12 negative for fracture +calcaneal spur  -exam benign, calf discomfort likely due to developing tone/spasticity. Stretch, bracing, discussed with patient 5/26    #Acute Hypoxic Respiratory Failure secondary to COVID-19 PNA 4/8  - Hydroxychloroquine not given, pt with prolonged QTc  - EKG - QTc: 500 (4/14), incomplete RBBB  - Incentive spirometry, robitussin DM, ocean spray  -patient tolerating therapies without O2  -COVID reswab NEGATIVE 5/13. Afebrile, no SOB or cough    #UTI: resolved  -completed macrobid 5/15  -urine cultures - +E coli >100k macrobid sensitive  -mild leukocytosis 11.72 5/14. Afebrile, symptoms improved  CBC 5/18 - 10.27 stable --> 8.89 5/21--> 7.55 5/25    #BRADY 2/2 to dehydration vs UTI - resolved  - encourage po fluids . BUn /Cr 14/0.83 5/21    #HTN  - Lopressor 25 q12, amlodipine 5 qd,  - hydralazine dc'd 5/27  controlled 5/28 (125/64 - 155/78)    #type II Mobitz block, known RBBB  - s/p Medtronic Micra AV+ leadless pacemaker  - outpt EP followup    #T2DM.  - SSI ACHS  -diabetic educator consult appreciated  continue metformin 100 mg BID  -lantus 20 units qhs  controlled 5/28  PCP f/u on dc    #HLD  - cont simvastatin 20 qhs    #insomnia  - melatonin 3    #Pain Mgmt   - Tylenol PRN    #GI/Bowel Mgmt   - Continent  - pepcid    #/Bladder Mgmt   - Continent, PVR negative    #FEN   - Diet - Regular + Thins  [CCHO]  DASH/TLC    #- DVT PPX  - lovenox    #Case discussed in IDT rounds 5/27:  -patient continues to make gains. CS transfers, CS bADLs, CG toilet transfers. Ambulates 150 feet with CG assist, negotiates 4 steps with min assist x 2. Has 2 steps, no HR at home  -goals: min assist bathing, supervision medications and money management  -for dc home 5/29 or 5/30 depending on scheduling of caregiver training with son and friend. home Pt, OT, SLP services and caregiver support in community. Has training session today, if all goes well, for dc tomorrow 5/29    #LABS  caregiver training  AFO as outpatient

## 2020-05-28 NOTE — PROGRESS NOTE ADULT - EXTREMITIES COMMENTS
RLE knee and ankle edema and ecchymosis improved. Not TTP.   + right lateral ankle laxity - slightly inverted

## 2020-05-28 NOTE — PROGRESS NOTE ADULT - SUBJECTIVE AND OBJECTIVE BOX
Patient is a 69y old  Female who presents with a chief complaint of left frontal parietal CVA with right HP and aphasia, COVID+ 4/8/20 (27 May 2020 14:30)      HPI:  SHIREEN CASH is a 68yo female RH dominant with PMH of HTN, HLD, T2DM, hx CVA, GERD, known RBBB presented to [facility] ED on 4/8/20 with complaints of fever, chills, cough, chest pain, SOB for 5 days. Per patient, her  tested +COVID at an outpatient testing facility a few days prior, patient herself had tested negative at the time. They continued to share the same living space. ED workup showed +COVID and Mobitz type II block. EP was consulted and patient is s/p Medtronic Micra AV+ leadless pacemaker implantation via right femoral vein on 4/10/20. Hydroxychloroquine presumably not given due to prolonged QTc.     On 4/16/20, patient developed acute aphasia and right sided weakness. CT head showed an acute LEFT frontal/parietal CVA. CTA head/neck showed LEFT ICA stenosis but no occlusion. tPA was not administered due to timing and no penumbra. Placed on full dose lovenox as thoguht to be hypercoagulable secondary to COVID. Patient was cleared for regular consistency solids, thin liquids. Transferred to [place] for acute inpatient rehab services 4/23/20. (23 Apr 2020 14:26)      PAST MEDICAL & SURGICAL HISTORY:  H/O gastroesophageal reflux (GERD)  HTN (hypertension)  DM (diabetes mellitus)  History of benign brain tumor      MEDICATIONS  (STANDING):  amLODIPine   Tablet 5 milliGRAM(s) Oral daily  ammonium lactate 12% Lotion 1 Application(s) Topical two times a day  aspirin enteric coated 81 milliGRAM(s) Oral daily  atorvastatin 10 milliGRAM(s) Oral at bedtime  dextrose 5%. 1000 milliLiter(s) (50 mL/Hr) IV Continuous <Continuous>  dextrose 50% Injectable 12.5 Gram(s) IV Push once  dextrose 50% Injectable 25 Gram(s) IV Push once  dextrose 50% Injectable 25 Gram(s) IV Push once  enoxaparin Injectable 40 milliGRAM(s) SubCutaneous daily  famotidine    Tablet 20 milliGRAM(s) Oral daily  FLUoxetine 20 milliGRAM(s) Oral daily  gabapentin 300 milliGRAM(s) Oral at bedtime  insulin glargine Injectable (LANTUS) 20 Unit(s) SubCutaneous at bedtime  insulin lispro (HumaLOG) corrective regimen sliding scale   SubCutaneous at bedtime  insulin lispro (HumaLOG) corrective regimen sliding scale   SubCutaneous before breakfast  lidocaine   Patch 1 Patch Transdermal daily  melatonin 3 milliGRAM(s) Oral at bedtime  metFORMIN 1000 milliGRAM(s) Oral two times a day with meals  metoprolol tartrate 25 milliGRAM(s) Oral every 12 hours  nystatin Powder 1 Application(s) Topical two times a day  polyethylene glycol 3350 17 Gram(s) Oral daily  senna 2 Tablet(s) Oral at bedtime  simethicone 80 milliGRAM(s) Chew every 8 hours  sodium chloride 0.65% Nasal 1 Spray(s) Both Nostrils four times a day    MEDICATIONS  (PRN):  acetaminophen   Tablet .. 650 milliGRAM(s) Oral every 6 hours PRN Temp greater or equal to 38C (100.4F), Mild Pain (1 - 3)  benzonatate 100 milliGRAM(s) Oral every 8 hours PRN Cough  dextrose 40% Gel 15 Gram(s) Oral once PRN Blood Glucose LESS THAN 70 milliGRAM(s)/deciliter  glucagon  Injectable 1 milliGRAM(s) IntraMuscular once PRN Glucose LESS THAN 70 milligrams/deciliter  lactulose Syrup 10 Gram(s) Oral daily PRN constipation  saline laxative (FLEET) Rectal Enema 1 Enema Rectal once PRN if no BM by this evening 5/13/20      Allergies    No Known Allergies    Intolerances            PHYSICAL EXAM  69y  Vital Signs Last 24 Hrs  T(C): 36.7 (28 May 2020 09:08), Max: 36.9 (27 May 2020 21:34)  T(F): 98 (28 May 2020 09:08), Max: 98.5 (27 May 2020 21:34)  HR: 71 (28 May 2020 09:08) (71 - 74)  BP: 155/78 (28 May 2020 09:08) (125/64 - 155/78)  BP(mean): --  RR: 16 (28 May 2020 09:08) (16 - 16)  SpO2: 97% (28 May 2020 09:08) (96% - 98%)  Daily     Daily       RECENT LABS:                      CAPILLARY BLOOD GLUCOSE      POCT Blood Glucose.: 89 mg/dL (28 May 2020 07:20)  POCT Blood Glucose.: 108 mg/dL (27 May 2020 22:07) Patient is a 69y old  Female who presents with a chief complaint of left frontal parietal CVA with right HP and aphasia, COVID+ 4/8/20 (27 May 2020 14:30)      HPI:  SHIREEN CASH is a 70yo female RH dominant with PMH of HTN, HLD, T2DM, hx CVA, GERD, known RBBB presented to [facility] ED on 4/8/20 with complaints of fever, chills, cough, chest pain, SOB for 5 days. Per patient, her  tested +COVID at an outpatient testing facility a few days prior, patient herself had tested negative at the time. They continued to share the same living space. ED workup showed +COVID and Mobitz type II block. EP was consulted and patient is s/p Medtronic Micra AV+ leadless pacemaker implantation via right femoral vein on 4/10/20. Hydroxychloroquine presumably not given due to prolonged QTc.     On 4/16/20, patient developed acute aphasia and right sided weakness. CT head showed an acute LEFT frontal/parietal CVA. CTA head/neck showed LEFT ICA stenosis but no occlusion. tPA was not administered due to timing and no penumbra. Placed on full dose lovenox as thoguht to be hypercoagulable secondary to COVID. Patient was cleared for regular consistency solids, thin liquids. Transferred to [place] for acute inpatient rehab services 4/23/20. (23 Apr 2020 14:26)      PAST MEDICAL & SURGICAL HISTORY:  H/O gastroesophageal reflux (GERD)  HTN (hypertension)  DM (diabetes mellitus)  History of benign brain tumor      MEDICATIONS  (STANDING):  amLODIPine   Tablet 5 milliGRAM(s) Oral daily  ammonium lactate 12% Lotion 1 Application(s) Topical two times a day  aspirin enteric coated 81 milliGRAM(s) Oral daily  atorvastatin 10 milliGRAM(s) Oral at bedtime  dextrose 5%. 1000 milliLiter(s) (50 mL/Hr) IV Continuous <Continuous>  dextrose 50% Injectable 12.5 Gram(s) IV Push once  dextrose 50% Injectable 25 Gram(s) IV Push once  dextrose 50% Injectable 25 Gram(s) IV Push once  enoxaparin Injectable 40 milliGRAM(s) SubCutaneous daily  famotidine    Tablet 20 milliGRAM(s) Oral daily  FLUoxetine 20 milliGRAM(s) Oral daily  gabapentin 300 milliGRAM(s) Oral at bedtime  insulin glargine Injectable (LANTUS) 20 Unit(s) SubCutaneous at bedtime  insulin lispro (HumaLOG) corrective regimen sliding scale   SubCutaneous at bedtime  insulin lispro (HumaLOG) corrective regimen sliding scale   SubCutaneous before breakfast  lidocaine   Patch 1 Patch Transdermal daily  melatonin 3 milliGRAM(s) Oral at bedtime  metFORMIN 1000 milliGRAM(s) Oral two times a day with meals  metoprolol tartrate 25 milliGRAM(s) Oral every 12 hours  nystatin Powder 1 Application(s) Topical two times a day  polyethylene glycol 3350 17 Gram(s) Oral daily  senna 2 Tablet(s) Oral at bedtime  simethicone 80 milliGRAM(s) Chew every 8 hours  sodium chloride 0.65% Nasal 1 Spray(s) Both Nostrils four times a day    MEDICATIONS  (PRN):  acetaminophen   Tablet .. 650 milliGRAM(s) Oral every 6 hours PRN Temp greater or equal to 38C (100.4F), Mild Pain (1 - 3)  benzonatate 100 milliGRAM(s) Oral every 8 hours PRN Cough  dextrose 40% Gel 15 Gram(s) Oral once PRN Blood Glucose LESS THAN 70 milliGRAM(s)/deciliter  glucagon  Injectable 1 milliGRAM(s) IntraMuscular once PRN Glucose LESS THAN 70 milligrams/deciliter  lactulose Syrup 10 Gram(s) Oral daily PRN constipation  saline laxative (FLEET) Rectal Enema 1 Enema Rectal once PRN if no BM by this evening 5/13/20      Allergies    No Known Allergies    Intolerances            PHYSICAL EXAM  69y  Vital Signs Last 24 Hrs  T(C): 36.7 (28 May 2020 09:08), Max: 36.9 (27 May 2020 21:34)  T(F): 98 (28 May 2020 09:08), Max: 98.5 (27 May 2020 21:34)  HR: 71 (28 May 2020 09:08) (71 - 74)  BP: 155/78 (28 May 2020 09:08) (125/64 - 155/78)  BP(mean): --  RR: 16 (28 May 2020 09:08) (16 - 16)  SpO2: 97% (28 May 2020 09:08) (96% - 98%)  Daily     Daily       RECENT LABS:              EXAM:  CT BRAIN      PROCEDURE DATE:  05/26/2020        INTERPRETATION:  Clinical indications: Follow-up subarachnoid hemorrhage    Multiple axial sections were performed base of skull to vertex without contrast enhancement.    This exam is compared prior noncontrast head CT performed on May 22, 2020    Parenchymal volume loss and chronic microvascular ischemic changes again seen.    Areas of encephalomalacia and gliosis involving the high left frontal, left posterior frontal/parietal and right cerebellar region are again identified and unchanged. These findings are likely compatible with areas of old infarcts. Previously noted area of high attenuation in the left frontal region is less conspicuous which is compatible with expected evolutionary changes.    No new areas of acute hemorrhage is seen    Evaluation of the osseous structures with the appropriate window appears unremarkable.    The visualized paranasal sinuses mastoid and middle ear appear clear.    IMPRESSION: Previously noted left frontal hemorrhage has demonstrated expected evolutionary changes.                  TREVER JONES M.D.; ATTENDING RADIOLOGIST  This document has been electronically signed. May 26 2020  1:59PM                        CAPILLARY BLOOD GLUCOSE      POCT Blood Glucose.: 89 mg/dL (28 May 2020 07:20)  POCT Blood Glucose.: 108 mg/dL (27 May 2020 22:07)

## 2020-05-28 NOTE — PROGRESS NOTE ADULT - COMMENTS
Patient seen and examined in the AM using pacific : Indian @ 9:00. Patient denies any complaints. Deneies LA, Nausea, CP. States her knee and foot pain have improved. Patient informed of her improving CT scan. patient is hopeful to be discharged soon. Told of plan for family training with kaylen.   As per nursing staff, patient was seen attempting to go to bathroom on her own. Re-enforced safety concerns to patient especially due to weakness on the right foot. Patient seen and examined in the AM using pacific  Ita: shamar @ 9:00. Patient denies any complaints. Deneies LA, Nausea, CP. States her knee and foot pain have improved. Patient informed of her improving CT scan. patient is hopeful to be discharged soon. Told of plan for family training with kaylen.     Safety reinforced as patient noted to be more impulsive, not waiting for help to transfer OOB/try to go to bathroom using RW on her own  As per nursing staff, patient was seen attempting to go to bathroom on her own. Re-enforced safety concerns to patient especially due to weakness on the right foot.

## 2020-05-28 NOTE — PROGRESS NOTE ADULT - SUBJECTIVE AND OBJECTIVE BOX
Hospitalist: Jatin Trotter DO    CHIEF COMPLAINT: Patient is a 69y old  female who presents with a chief complaint of left frontal parietal CVA with right HP and aphasia, COVID+ 4/8/20 (28 May 2020 10:49)    SUBJECTIVE / OVERNIGHT EVENTS: Patient seen and examined. No acute events overnight. Pain well controlled and patient without any complaints.    MEDICATIONS  (STANDING):  amLODIPine   Tablet 5 milliGRAM(s) Oral daily  ammonium lactate 12% Lotion 1 Application(s) Topical two times a day  aspirin enteric coated 81 milliGRAM(s) Oral daily  atorvastatin 10 milliGRAM(s) Oral at bedtime  dextrose 5%. 1000 milliLiter(s) (50 mL/Hr) IV Continuous <Continuous>  dextrose 50% Injectable 12.5 Gram(s) IV Push once  dextrose 50% Injectable 25 Gram(s) IV Push once  dextrose 50% Injectable 25 Gram(s) IV Push once  enoxaparin Injectable 40 milliGRAM(s) SubCutaneous daily  famotidine    Tablet 20 milliGRAM(s) Oral daily  FLUoxetine 20 milliGRAM(s) Oral daily  gabapentin 300 milliGRAM(s) Oral at bedtime  insulin glargine Injectable (LANTUS) 20 Unit(s) SubCutaneous at bedtime  insulin lispro (HumaLOG) corrective regimen sliding scale   SubCutaneous at bedtime  insulin lispro (HumaLOG) corrective regimen sliding scale   SubCutaneous before breakfast  lidocaine   Patch 1 Patch Transdermal daily  melatonin 3 milliGRAM(s) Oral at bedtime  metFORMIN 1000 milliGRAM(s) Oral two times a day with meals  metoprolol tartrate 25 milliGRAM(s) Oral every 12 hours  nystatin Powder 1 Application(s) Topical two times a day  polyethylene glycol 3350 17 Gram(s) Oral daily  senna 2 Tablet(s) Oral at bedtime  simethicone 80 milliGRAM(s) Chew every 8 hours  sodium chloride 0.65% Nasal 1 Spray(s) Both Nostrils four times a day    MEDICATIONS  (PRN):  acetaminophen   Tablet .. 650 milliGRAM(s) Oral every 6 hours PRN Temp greater or equal to 38C (100.4F), Mild Pain (1 - 3)  benzonatate 100 milliGRAM(s) Oral every 8 hours PRN Cough  dextrose 40% Gel 15 Gram(s) Oral once PRN Blood Glucose LESS THAN 70 milliGRAM(s)/deciliter  glucagon  Injectable 1 milliGRAM(s) IntraMuscular once PRN Glucose LESS THAN 70 milligrams/deciliter  lactulose Syrup 10 Gram(s) Oral daily PRN constipation  saline laxative (FLEET) Rectal Enema 1 Enema Rectal once PRN if no BM by this evening 5/13/20      VITALS:  T(F): 98 (05-28-20 @ 09:08), Max: 98.5 (05-27-20 @ 21:34)  HR: 71 (05-28-20 @ 09:08) (71 - 74)  BP: 155/78 (05-28-20 @ 09:08) (125/64 - 155/78)  RR: 16 (05-28-20 @ 09:08) (16 - 16)  SpO2: 97% (05-28-20 @ 09:08)      REVIEW OF SYSTEMS:  For ROV please refer back to H&P     PHYSICAL EXAM:  In NAD  HEENT- EOMI  Heart- RRR, S1S2  Lungs- CTA bl.  Abd- + BS, NT, ND, normoactive bs  Ext- No calf pain  Neuro- Exam unchanged    LABS:              12.4                 x    | x    | x            8.67  >-----------< 464     ------------------------< x                     38.2                 x    | x    | x                                            Ca x     Mg x     Ph x           TPro  8.5  /  Alb  4.5      TBili  0.7  /  DBili  x         AST  22  /  ALT  19            AlkPhos  46       CAPILLARY BLOOD GLUCOSE      POCT Blood Glucose.: 89 mg/dL (28 May 2020 07:20)  POCT Blood Glucose.: 108 mg/dL (27 May 2020 22:07)    MICROBIOLOGY:  Urine Culture - 48 Hour Hold (05.08.20 @ 09:15)    Culture Results:   >100,000 CFU/ml Escherichia coli    -  Trimethoprim/Sulfamethoxazole: R >2/38    -  Tobramycin: S <=4    -  Piperacillin/Tazobactam: S <=16    -  Tigecycline: S <=2    -  Nitrofurantoin: S <=32 Should not be used to treat pyelonephritis    -  Meropenem: S <=1    -  Levofloxacin: R >4    -  Imipenem: S <=1    -  Gentamicin: S <=4    -  Ciprofloxacin: R >2    -  Ceftriaxone: S <=1 Enterobacter, Citrobacter, and Serratia may develop resistance during prolonged therapy    Organism: Escherichia coli    Specimen Source: .Urine    -  Cefoxitin: S <=8    -  Cefepime: S <=4    -  Cefazolin: S <=8 ([id number]) For uncomplicated UTI with K. pneumoniae, E. coli, or P. mirablis: RENNY <=16 is sensitive and RENNY >=32 is resistant. This also predicts results for oral agents cefaclor, cefdinir, cefpodoxime, cefprozil, cefuroxime axetil, cephalexin and locarbef for uncomplicated UTI. Note that some isolates may be susceptible to these agents while testing resistant to cefazolin.    -  Aztreonam: S <=4    -  Ampicillin: R >16 These ampicillin results predict results for amoxicillin    -  Ampicillin/Sulbactam: R >16/8 Enterobacter, Citrobacter, and Serratia may develop resistance during prolonged therapy (3-4 days)    -  Amikacin: S <=16    Method Type: RENNY    Organism Identification: Escherichia coli    RADIOLOGY & ADDITIONAL TESTS:    Imaging Personally Reviewed:    [X] Consultant(s) Notes Reviewed:  [X] Care Discussed with Consultants/Other Providers:

## 2020-05-29 ENCOUNTER — TRANSCRIPTION ENCOUNTER (OUTPATIENT)
Age: 70
End: 2020-05-29

## 2020-05-29 VITALS
DIASTOLIC BLOOD PRESSURE: 70 MMHG | HEART RATE: 60 BPM | RESPIRATION RATE: 15 BRPM | TEMPERATURE: 98 F | OXYGEN SATURATION: 98 % | SYSTOLIC BLOOD PRESSURE: 126 MMHG

## 2020-05-29 PROCEDURE — 99233 SBSQ HOSP IP/OBS HIGH 50: CPT

## 2020-05-29 PROCEDURE — 99238 HOSP IP/OBS DSCHRG MGMT 30/<: CPT

## 2020-05-29 RX ADMIN — SIMETHICONE 80 MILLIGRAM(S): 80 TABLET, CHEWABLE ORAL at 13:52

## 2020-05-29 RX ADMIN — ENOXAPARIN SODIUM 40 MILLIGRAM(S): 100 INJECTION SUBCUTANEOUS at 12:02

## 2020-05-29 RX ADMIN — NYSTATIN CREAM 1 APPLICATION(S): 100000 CREAM TOPICAL at 05:50

## 2020-05-29 RX ADMIN — Medication 25 MILLIGRAM(S): at 05:50

## 2020-05-29 RX ADMIN — SIMETHICONE 80 MILLIGRAM(S): 80 TABLET, CHEWABLE ORAL at 05:50

## 2020-05-29 RX ADMIN — Medication 81 MILLIGRAM(S): at 12:02

## 2020-05-29 RX ADMIN — Medication 1 APPLICATION(S): at 05:49

## 2020-05-29 RX ADMIN — FAMOTIDINE 20 MILLIGRAM(S): 10 INJECTION INTRAVENOUS at 12:02

## 2020-05-29 RX ADMIN — AMLODIPINE BESYLATE 5 MILLIGRAM(S): 2.5 TABLET ORAL at 05:49

## 2020-05-29 RX ADMIN — Medication 1 SPRAY(S): at 05:50

## 2020-05-29 RX ADMIN — Medication 1 SPRAY(S): at 13:39

## 2020-05-29 RX ADMIN — METFORMIN HYDROCHLORIDE 1000 MILLIGRAM(S): 850 TABLET ORAL at 07:45

## 2020-05-29 RX ADMIN — Medication 20 MILLIGRAM(S): at 12:02

## 2020-05-29 RX ADMIN — LIDOCAINE 1 PATCH: 4 CREAM TOPICAL at 12:03

## 2020-05-29 NOTE — PROGRESS NOTE ADULT - EXTREMITIES
detailed exam
No cyanosis, clubbing or edema
detailed exam
No cyanosis, clubbing or edema

## 2020-05-29 NOTE — PROGRESS NOTE ADULT - RS GEN PE MLT RESP DETAILS PC
breath sounds equal/good air movement/clear to auscultation bilaterally/normal/airway patent good air movement/respirations non-labored/clear to auscultation bilaterally/normal/airway patent/breath sounds equal

## 2020-05-29 NOTE — PROGRESS NOTE ADULT - COMMENTS
Patient seen and examined in the AM using pacific : Taiwanese ______ Patient denies any complaints. Deneies LA, Nausea, CP. States her knee and foot pain have improved. Patient seen and examined in the AM using pacific : shamar Varela at 10:30 AM. Patient denies any complaints. Denies H/A, Nausea, CP. States her knee and foot pain have improved. Spirits good, excited about going home.    Had family training session yesterday which went well. She reports that she knows she requries assistance, and that her friend Saima will be present. no new complaints

## 2020-05-29 NOTE — PROGRESS NOTE ADULT - CONSTITUTIONAL COMMENTS
Patient still with significant expressive language deficits, fair yes/no via head nods, some errors but self-corrects
alert NAD
alert, appears comfortable, NAD
alert, comfortable at rest +pain right knee with movement RLE
alert, mood appears fair. follows simple commands
alert, tracks right and left. mild right patricio inattention. Follows 1 step commands, reduced 2 step +expressive>receptive aphasia
eyes open, good- simple attention. Some recall deficits (said she had xray knee; was not yet performed)
alert, was pleasant and cooperative this morning however later in day therapists reported reduced participation and possible increased adjustment symptoms
smiling, mood significantly imrpoved. Still has difficulty with expressive language and word finding but improved insight and output. low vocal volume
alert, affect significantly brighter, more hopeful no tearfulness today. miproved speech intellibility
alert, stable at rest but in pain with palpation lower leg and ROM lower leg +aphasic but improving verbal output
alert +expressive aphasia some difficulty receptive language derick more complex info
alert pleasant +expressive difficulties but simple receptive language adequate  for exam
alert, mood stable
alert. mood stable, no distrss, not in pain
alert, NAD +aphasic but continues to have improved verbal output
alert, comfortable
alert, distress when examining right ankle otherwise comofortable. Visibly frustrated at times from aphasia
alert, mood overall appears stable, no distress +expressive >receptive aphasia

## 2020-05-29 NOTE — PROGRESS NOTE ADULT - ASSESSMENT
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 stable 2.5 cm L arachnoid cyst.  HEAD CT 5/20: Small left frontal subarachnoid hemorrhage is decreased. Multiple chronic infarcts.  -repeat Head CT 5/22: stable left frontal lobe hemorrhage  -Stable ct head 5/26, expected evolutionary changes  - Cleared to resume ASA 81 mg daily,,  PPX lovenox 5/20 after stable Head CT by neuro  - cont prozac for motor recovery. 20 mg daily   -right SAFO as outpatient  -caregiver training taking place 5/28      # r/o coagulopathy due to spontaneous SAH and RLE hematoma:  -Heme/onc consulted for ?hypo-coagulopathy:. 5/18 consult read and appreciated:  ·	 stroke likely secondary to COVID 19+  ·	 Head CT improved, no new areas bleed, and with multiple infarcts, as recommended by hospitalist. Vit K held as agreed by heme and hospitalist   -PCP f/u on dc    #RLE pain:  resolved  - Doppler negative DVT 4/28, 5/7  - 5/8 CT RLE with finding of hematoma, vascular consulted would monitor for now, can continue dvt ppx given risk of COVID - appears improved on exam   -5/8 LE ANGEL performed - with +moderate PAD  -Xray right knee 4/30 negative for acute fracture  Xray right ankle 5/12 negative for fracture +calcaneal spur  -exam benign, calf discomfort likely due to developing tone/spasticity. Stretch, bracing, discussed with patient 5/26    #Acute Hypoxic Respiratory Failure secondary to COVID-19 PNA 4/8  - Hydroxychloroquine not given, pt with prolonged QTc  - EKG - QTc: 500 (4/14), incomplete RBBB  - Incentive spirometry, robitussin DM, ocean spray  -patient tolerating therapies without O2  -COVID reswab NEGATIVE 5/13. Afebrile, no SOB or cough    #UTI: resolved  -completed macrobid 5/15  -urine cultures - +E coli >100k macrobid sensitive  -mild leukocytosis 11.72 5/14. Afebrile, symptoms improved  CBC 5/18 - 10.27 stable --> 8.89 5/21--> 7.55 5/25    #BRADY 2/2 to dehydration vs UTI - resolved  - encourage po fluids . BUn /Cr 14/0.83 5/21    #HTN  - Lopressor 25 q12, amlodipine 5 qd (elevated pressures in evening, will consider re-timing norvasc for the noon time)  - hydralazine dc'd 5/27      #type II Mobitz block, known RBBB  - s/p Medtronic Micra AV+ leadless pacemaker  - outpt EP followup    #T2DM.  - SSI ACHS  -diabetic educator consult appreciated  continue metformin 100 mg BID  -lantus 20 units qhs  controlled 5/28  PCP f/u on dc    #HLD  - cont simvastatin 20 qhs    #insomnia  - melatonin 3    #Pain Mgmt   - Tylenol PRN    #GI/Bowel Mgmt   - Continent  - pepcid    #/Bladder Mgmt   - Continent, PVR negative    #FEN   - Diet - Regular + Thins  [[facility]]  DASH/TLC    #- DVT PPX  - lovenox    #Case discussed in IDT rounds 5/27:  -patient continues to make gains. CS transfers, CS bADLs, CG toilet transfers. Ambulates 150 feet with CG assist, negotiates 4 steps with min assist x 2. Has 2 steps, no HR at home  -goals: min assist bathing, supervision medications and money management  -for dc home 5/29 or 5/30 depending on scheduling of caregiver training with son and friend. home Pt, OT, SLP services and caregiver support in community. 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 stable 2.5 cm L arachnoid cyst.  HEAD CT 5/20: Small left frontal subarachnoid hemorrhage is decreased. Multiple chronic infarcts.  - Head CT 5/22: stable left frontal lobe hemorrhage  -Head CT 5/26, expected evolutionary changes  - Cleared to resume ASA 81 mg daily by neuro  - cont prozac for motor recovery. 20 mg daily   -right SAFO as outpatient. Discussed with patient. Rx provided  -neurology f/u on dc  home care referral with home Pt, OT, SLP services on dc      # r/o coagulopathy due to spontaneous SAH and RLE hematoma:  -Heme/onc consulted for ?hypo-coagulopathy:. 5/18 consult read and appreciated:  ·	 stroke likely secondary to COVID 19+  ·	 Head CT improved, no new areas bleed, and with multiple infarcts, as recommended by hospitalist. Vit K held as agreed by heme and hospitalist   -neurology and PCP f/u on dc    #RLE pain: hematomas  resolved  - Doppler negative DVT 4/28, 5/7  - 5/8 CT RLE with finding of hematoma, vascular consulted would monitor for now, can continue dvt ppx given risk of COVID - appears improved on exam   -5/8 LE ANGEL performed - with +moderate PAD  -Xray right knee 4/30 negative for acute fracture  Xray right ankle 5/12 negative for fracture +calcaneal spur      #Acute Hypoxic Respiratory Failure secondary to COVID-19 PNA 4/8  - Hydroxychloroquine not given, pt with prolonged QTc  - EKG - QTc: 500 (4/14), incomplete RBBB  -COVID reswab NEGATIVE 5/13. Afebrile, no SOB or cough  -stable on RA  PCP f/u on dc    #UTI:  - resolved completed macrobid 5/15  -urine cultures - +E coli >100k macrobid sensitive    #BRADY 2/2 to dehydration vs UTI   - resolved  - encourage po fluids . BUn /Cr 14/0.83 5/21    #HTN  - Lopressor 25 q12, amlodipine 5 qd (elevated pressures in evening, will re-time norvasc for noon)  - hydralazine dc'd 5/27  PCP f/u on dc      #type II Mobitz block, known RBBB  - s/p Medtronic Micra AV+ leadless pacemaker  - outpt EP followup    #T2DM. controlled  - SSI ACHS  -diabetic educator consult appreciated  continue metformin 100 mg BID  -lantus 20 units qhs  PCP f/u on dc    #HLD  - cont simvastatin 20 qhs  #FEN   - Diet - Regular + Thins  [[facility]]  DASH/TLC    Patient medically cleared for dc today. Patient and famly aware and agreeable. f/u with PCP, neurology and PM+R discussed. AFO for right foot drop, safety precautions also reviewed including need for assistance and RW for ambulation. Patient verbalized understanding and agreement.

## 2020-05-29 NOTE — PROGRESS NOTE ADULT - SUBJECTIVE AND OBJECTIVE BOX
Patient is a 69y old  Female who presents with a chief complaint of left frontal parietal CVA with right HP and aphasia, COVID+ 4/8/20 (29 May 2020 08:14)      HPI:  SHIREEN CASH is a 68yo female RH dominant with PMH of HTN, HLD, T2DM, hx CVA, GERD, known RBBB presented to [facility] ED on 4/8/20 with complaints of fever, chills, cough, chest pain, SOB for 5 days. Per patient, her  tested +COVID at an outpatient testing facility a few days prior, patient herself had tested negative at the time. They continued to share the same living space. ED workup showed +COVID and Mobitz type II block. EP was consulted and patient is s/p Medtronic Micra AV+ leadless pacemaker implantation via right femoral vein on 4/10/20. Hydroxychloroquine presumably not given due to prolonged QTc.     On 4/16/20, patient developed acute aphasia and right sided weakness. CT head showed an acute LEFT frontal/parietal CVA. CTA head/neck showed LEFT ICA stenosis but no occlusion. tPA was not administered due to timing and no penumbra. Placed on full dose lovenox as thoguht to be hypercoagulable secondary to COVID. Patient was cleared for regular consistency solids, thin liquids. Transferred to [place] for acute inpatient rehab services 4/23/20. (23 Apr 2020 14:26)      PAST MEDICAL & SURGICAL HISTORY:  H/O gastroesophageal reflux (GERD)  HTN (hypertension)  DM (diabetes mellitus)  History of benign brain tumor      MEDICATIONS  (STANDING):  amLODIPine   Tablet 5 milliGRAM(s) Oral daily  ammonium lactate 12% Lotion 1 Application(s) Topical two times a day  aspirin enteric coated 81 milliGRAM(s) Oral daily  atorvastatin 10 milliGRAM(s) Oral at bedtime  dextrose 5%. 1000 milliLiter(s) (50 mL/Hr) IV Continuous <Continuous>  dextrose 50% Injectable 12.5 Gram(s) IV Push once  dextrose 50% Injectable 25 Gram(s) IV Push once  dextrose 50% Injectable 25 Gram(s) IV Push once  enoxaparin Injectable 40 milliGRAM(s) SubCutaneous daily  famotidine    Tablet 20 milliGRAM(s) Oral daily  FLUoxetine 20 milliGRAM(s) Oral daily  gabapentin 300 milliGRAM(s) Oral at bedtime  insulin glargine Injectable (LANTUS) 20 Unit(s) SubCutaneous at bedtime  insulin lispro (HumaLOG) corrective regimen sliding scale   SubCutaneous at bedtime  insulin lispro (HumaLOG) corrective regimen sliding scale   SubCutaneous before breakfast  lidocaine   Patch 1 Patch Transdermal daily  melatonin 3 milliGRAM(s) Oral at bedtime  metFORMIN 1000 milliGRAM(s) Oral two times a day with meals  metoprolol tartrate 25 milliGRAM(s) Oral every 12 hours  nystatin Powder 1 Application(s) Topical two times a day  polyethylene glycol 3350 17 Gram(s) Oral daily  senna 2 Tablet(s) Oral at bedtime  simethicone 80 milliGRAM(s) Chew every 8 hours  sodium chloride 0.65% Nasal 1 Spray(s) Both Nostrils four times a day    MEDICATIONS  (PRN):  acetaminophen   Tablet .. 650 milliGRAM(s) Oral every 6 hours PRN Temp greater or equal to 38C (100.4F), Mild Pain (1 - 3)  benzonatate 100 milliGRAM(s) Oral every 8 hours PRN Cough  dextrose 40% Gel 15 Gram(s) Oral once PRN Blood Glucose LESS THAN 70 milliGRAM(s)/deciliter  glucagon  Injectable 1 milliGRAM(s) IntraMuscular once PRN Glucose LESS THAN 70 milligrams/deciliter  lactulose Syrup 10 Gram(s) Oral daily PRN constipation  saline laxative (FLEET) Rectal Enema 1 Enema Rectal once PRN if no BM by this evening 5/13/20      Allergies    No Known Allergies    Intolerances          VITALS  69y  Vital Signs Last 24 Hrs  T(C): 36.9 (29 May 2020 07:57), Max: 36.9 (28 May 2020 20:46)  T(F): 98.5 (29 May 2020 07:57), Max: 98.5 (29 May 2020 07:57)  HR: 60 (29 May 2020 07:57) (60 - 79)  BP: 126/70 (29 May 2020 07:57) (126/70 - 167/78)  BP(mean): --  RR: 15 (29 May 2020 07:57) (15 - 16)  SpO2: 98% (29 May 2020 07:57) (98% - 98%)  Daily     Daily         RECENT LABS:                          12.4   8.67  )-----------( 464      ( 28 May 2020 11:40 )             38.2     05-28    138  |  99  |  16  ----------------------------<  114<H>  4.2   |  27  |  0.85    Ca    9.8      28 May 2020 10:10    TPro  8.5<H>  /  Alb  4.5  /  TBili  0.7  /  DBili  x   /  AST  22  /  ALT  19  /  AlkPhos  46  05-28    LIVER FUNCTIONS - ( 28 May 2020 10:10 )  Alb: 4.5 g/dL / Pro: 8.5 g/dL / ALK PHOS: 46 U/L / ALT: 19 U/L / AST: 22 U/L / GGT: x                   CAPILLARY BLOOD GLUCOSE      POCT Blood Glucose.: 87 mg/dL (29 May 2020 07:16)  POCT Blood Glucose.: 138 mg/dL (28 May 2020 20:40)

## 2020-05-29 NOTE — PROGRESS NOTE ADULT - REASON FOR ADMISSION
left frontal parietal CVA with right HP and aphasia, COVID+ 4/8/20
left frontal parietal CVA with right HP and aphasia, COVID+ 4/8/20 fu
left frontal parietal CVA with right HP and aphasia, COVID+ 4/8/20
left frontal parietal CVA with right HP and aphasia, COVID+ 4/8/20 fu
left frontal parietal CVA with right HP and aphasia, COVID+ 4/8/20
left frontal parietal CVA with right HP and aphasia, COVID+ 4/8/20 fu
left frontal parietal CVA with right HP and aphasia, COVID+ 4/8/20
left frontal parietal CVA with right HP and aphasia, COVID+ 4/8/20 fu

## 2020-05-29 NOTE — PROGRESS NOTE ADULT - SUBJECTIVE AND OBJECTIVE BOX
Hospitalist: Jatin Trotter DO    CHIEF COMPLAINT: Patient is a 69y old  female who presents with a chief complaint of left frontal parietal CVA with right HP and aphasia, COVID+ 4/8/20 (29 May 2020 08:14)    SUBJECTIVE / OVERNIGHT EVENTS: Patient seen and examined. No acute events overnight. Pain well controlled and patient without any complaints.    MEDICATIONS  (STANDING):  amLODIPine   Tablet 5 milliGRAM(s) Oral daily  ammonium lactate 12% Lotion 1 Application(s) Topical two times a day  aspirin enteric coated 81 milliGRAM(s) Oral daily  atorvastatin 10 milliGRAM(s) Oral at bedtime  dextrose 5%. 1000 milliLiter(s) (50 mL/Hr) IV Continuous <Continuous>  dextrose 50% Injectable 12.5 Gram(s) IV Push once  dextrose 50% Injectable 25 Gram(s) IV Push once  dextrose 50% Injectable 25 Gram(s) IV Push once  enoxaparin Injectable 40 milliGRAM(s) SubCutaneous daily  famotidine    Tablet 20 milliGRAM(s) Oral daily  FLUoxetine 20 milliGRAM(s) Oral daily  gabapentin 300 milliGRAM(s) Oral at bedtime  insulin glargine Injectable (LANTUS) 20 Unit(s) SubCutaneous at bedtime  insulin lispro (HumaLOG) corrective regimen sliding scale   SubCutaneous at bedtime  insulin lispro (HumaLOG) corrective regimen sliding scale   SubCutaneous before breakfast  lidocaine   Patch 1 Patch Transdermal daily  melatonin 3 milliGRAM(s) Oral at bedtime  metFORMIN 1000 milliGRAM(s) Oral two times a day with meals  metoprolol tartrate 25 milliGRAM(s) Oral every 12 hours  nystatin Powder 1 Application(s) Topical two times a day  polyethylene glycol 3350 17 Gram(s) Oral daily  senna 2 Tablet(s) Oral at bedtime  simethicone 80 milliGRAM(s) Chew every 8 hours  sodium chloride 0.65% Nasal 1 Spray(s) Both Nostrils four times a day    MEDICATIONS  (PRN):  acetaminophen   Tablet .. 650 milliGRAM(s) Oral every 6 hours PRN Temp greater or equal to 38C (100.4F), Mild Pain (1 - 3)  benzonatate 100 milliGRAM(s) Oral every 8 hours PRN Cough  dextrose 40% Gel 15 Gram(s) Oral once PRN Blood Glucose LESS THAN 70 milliGRAM(s)/deciliter  glucagon  Injectable 1 milliGRAM(s) IntraMuscular once PRN Glucose LESS THAN 70 milligrams/deciliter  lactulose Syrup 10 Gram(s) Oral daily PRN constipation  saline laxative (FLEET) Rectal Enema 1 Enema Rectal once PRN if no BM by this evening 5/13/20      VITALS:  T(F): 98.5 (05-29-20 @ 07:57), Max: 98.5 (05-29-20 @ 07:57)  HR: 60 (05-29-20 @ 07:57) (60 - 79)  BP: 126/70 (05-29-20 @ 07:57) (126/70 - 167/78)  RR: 15 (05-29-20 @ 07:57) (15 - 16)  SpO2: 98% (05-29-20 @ 07:57)      REVIEW OF SYSTEMS:  For ROV please refer back to H&P     PHYSICAL EXAM:  In NAD  HEENT- EOMI  Heart- RRR, S1S2  Lungs- CTA bl.  Abd- + BS, NT, ND, normoactive bs  Ext- No calf pain  Neuro- Exam unchanged    LABS:              12.4                 x    | x    | x            8.67  >-----------< 464     ------------------------< x                     38.2                 x    | x    | x                                            Ca x     Mg x     Ph x           TPro  8.5  /  Alb  4.5      TBili  0.7  /  DBili  x         AST  22  /  ALT  19            AlkPhos  46       CAPILLARY BLOOD GLUCOSE      POCT Blood Glucose.: 87 mg/dL (29 May 2020 07:16)  POCT Blood Glucose.: 138 mg/dL (28 May 2020 20:40)      RADIOLOGY & ADDITIONAL TESTS:    Imaging Personally Reviewed:    [X] Consultant(s) Notes Reviewed:  [X] Care Discussed with Consultants/Other Providers:

## 2020-05-29 NOTE — PROGRESS NOTE ADULT - GASTROINTESTINAL DETAILS
normal/nontender/soft/no distention
bowel sounds normal/soft/nontender
no distention/normal/soft/nontender
nontender/bowel sounds normal/soft
nontender/no distention/normal/soft/bowel sounds normal
nontender/no distention/soft
normal/nontender/bowel sounds normal/no distention/soft
normal/nontender/no distention/soft/bowel sounds normal
normal/nontender/soft
normal/nontender/soft/no distention
soft/nontender
soft/nontender/bowel sounds normal
soft/nontender/no distention/normal
no distention/normal/soft/nontender
nontender/normal/soft/no distention
nontender/soft/bowel sounds normal
soft/no rebound tenderness/bowel sounds normal/nontender
soft/no distention/normal/nontender
nontender/bowel sounds normal/soft

## 2020-05-29 NOTE — PROGRESS NOTE ADULT - CVS HE PE MLT D E PC
regular rate and rhythm
mild tenderness to palpation of lower mid sternal/xyphoid region. No abdominal tenderness/regular rate and rhythm/no murmur
regular rate and rhythm
regular rate and rhythm/no murmur/no rub
regular rate and rhythm
no murmur/regular rate and rhythm
regular rate and rhythm

## 2020-05-29 NOTE — DISCHARGE NOTE NURSING/CASE MANAGEMENT/SOCIAL WORK - NSDCPEPTSTRK_GEN_ALL_CORE
Risk factors for stroke/Need for follow up after discharge/Stroke education booklet/Signs and symptoms of stroke/Call 911 for stroke/Stroke support groups for patients, families, and friends/Prescribed medications/Stroke warning signs and symptoms

## 2020-05-29 NOTE — PROGRESS NOTE ADULT - CONSTITUTIONAL
Well-developed, well nourished
detailed exam
Well-developed, well nourished

## 2020-05-29 NOTE — PROGRESS NOTE ADULT - CONSTITUTIONAL DETAILS
no distress/well-groomed
no distress
well-groomed/no distress
well-nourished/well-developed/no distress

## 2020-05-29 NOTE — PROGRESS NOTE ADULT - MS EXT PE MLT D E PC
no pedal edema
right knee effusion improved, mild tenderness to palpation of right ankle, Ace wrapped for foot drop
no pedal edema
no pedal edema/normal/no clubbing

## 2020-05-29 NOTE — DISCHARGE NOTE NURSING/CASE MANAGEMENT/SOCIAL WORK - PATIENT PORTAL LINK FT
You can access the FollowMyHealth Patient Portal offered by [facility] by registering at the following website: [email or web address]. By joining [facility]’s FollowMyHealth portal, you will also be able to view your health information using other applications (apps) compatible with our system.

## 2020-06-05 DIAGNOSIS — R26.9 UNSPECIFIED ABNORMALITIES OF GAIT AND MOBILITY: ICD-10-CM

## 2020-06-05 DIAGNOSIS — I60.9 NONTRAUMATIC SUBARACHNOID HEMORRHAGE, UNSPECIFIED: ICD-10-CM

## 2020-06-05 DIAGNOSIS — M79.81 NONTRAUMATIC HEMATOMA OF SOFT TISSUE: ICD-10-CM

## 2020-06-05 DIAGNOSIS — R51 HEADACHE: ICD-10-CM

## 2020-06-05 DIAGNOSIS — I69.311 MEMORY DEFICIT FOLLOWING CEREBRAL INFARCTION: ICD-10-CM

## 2020-06-05 DIAGNOSIS — R45.87 IMPULSIVENESS: ICD-10-CM

## 2020-06-05 DIAGNOSIS — D64.9 ANEMIA, UNSPECIFIED: ICD-10-CM

## 2020-06-05 DIAGNOSIS — N39.0 URINARY TRACT INFECTION, SITE NOT SPECIFIED: ICD-10-CM

## 2020-06-05 DIAGNOSIS — B96.20 UNSPECIFIED ESCHERICHIA COLI [E. COLI] AS THE CAUSE OF DISEASES CLASSIFIED ELSEWHERE: ICD-10-CM

## 2020-06-05 DIAGNOSIS — E11.51 TYPE 2 DIABETES MELLITUS WITH DIABETIC PERIPHERAL ANGIOPATHY WITHOUT GANGRENE: ICD-10-CM

## 2020-06-05 DIAGNOSIS — K59.00 CONSTIPATION, UNSPECIFIED: ICD-10-CM

## 2020-06-05 DIAGNOSIS — D72.829 ELEVATED WHITE BLOOD CELL COUNT, UNSPECIFIED: ICD-10-CM

## 2020-06-05 DIAGNOSIS — G47.00 INSOMNIA, UNSPECIFIED: ICD-10-CM

## 2020-06-05 DIAGNOSIS — I45.10 UNSPECIFIED RIGHT BUNDLE-BRANCH BLOCK: ICD-10-CM

## 2020-06-05 DIAGNOSIS — Z95.0 PRESENCE OF CARDIAC PACEMAKER: ICD-10-CM

## 2020-06-05 DIAGNOSIS — R53.81 OTHER MALAISE: ICD-10-CM

## 2020-06-05 DIAGNOSIS — I69.320 APHASIA FOLLOWING CEREBRAL INFARCTION: ICD-10-CM

## 2020-06-05 DIAGNOSIS — I69.398 OTHER SEQUELAE OF CEREBRAL INFARCTION: ICD-10-CM

## 2020-06-05 DIAGNOSIS — I63.59 CEREBRAL INFARCTION DUE TO UNSPECIFIED OCCLUSION OR STENOSIS OF OTHER CEREBRAL ARTERY: ICD-10-CM

## 2020-06-05 DIAGNOSIS — D69.9 HEMORRHAGIC CONDITION, UNSPECIFIED: ICD-10-CM

## 2020-06-05 DIAGNOSIS — M25.461 EFFUSION, RIGHT KNEE: ICD-10-CM

## 2020-06-05 DIAGNOSIS — I65.22 OCCLUSION AND STENOSIS OF LEFT CAROTID ARTERY: ICD-10-CM

## 2020-06-05 DIAGNOSIS — E11.9 TYPE 2 DIABETES MELLITUS WITHOUT COMPLICATIONS: ICD-10-CM

## 2020-06-05 DIAGNOSIS — E11.65 TYPE 2 DIABETES MELLITUS WITH HYPERGLYCEMIA: ICD-10-CM

## 2020-06-05 DIAGNOSIS — Z51.89 ENCOUNTER FOR OTHER SPECIFIED AFTERCARE: ICD-10-CM

## 2020-06-05 DIAGNOSIS — R07.9 CHEST PAIN, UNSPECIFIED: ICD-10-CM

## 2020-06-05 DIAGNOSIS — I69.310 ATTENTION AND CONCENTRATION DEFICIT FOLLOWING CEREBRAL INFARCTION: ICD-10-CM

## 2020-06-05 DIAGNOSIS — E78.5 HYPERLIPIDEMIA, UNSPECIFIED: ICD-10-CM

## 2020-06-05 DIAGNOSIS — K21.9 GASTRO-ESOPHAGEAL REFLUX DISEASE WITHOUT ESOPHAGITIS: ICD-10-CM

## 2020-06-05 DIAGNOSIS — U07.1 COVID-19: ICD-10-CM

## 2020-06-05 DIAGNOSIS — E87.1 HYPO-OSMOLALITY AND HYPONATREMIA: ICD-10-CM

## 2020-06-05 DIAGNOSIS — I69.351 HEMIPLEGIA AND HEMIPARESIS FOLLOWING CEREBRAL INFARCTION AFFECTING RIGHT DOMINANT SIDE: ICD-10-CM

## 2020-06-05 DIAGNOSIS — F02.80 DEMENTIA IN OTHER DISEASES CLASSIFIED ELSEWHERE, UNSPECIFIED SEVERITY, WITHOUT BEHAVIORAL DISTURBANCE, PSYCHOTIC DISTURBANCE, MOOD DISTURBANCE, AND ANXIETY: ICD-10-CM

## 2020-06-05 DIAGNOSIS — D68.9 COAGULATION DEFECT, UNSPECIFIED: ICD-10-CM

## 2020-06-05 DIAGNOSIS — I10 ESSENTIAL (PRIMARY) HYPERTENSION: ICD-10-CM

## 2020-06-05 DIAGNOSIS — R41.4 NEUROLOGIC NEGLECT SYNDROME: ICD-10-CM

## 2020-06-05 DIAGNOSIS — M21.371 FOOT DROP, RIGHT FOOT: ICD-10-CM

## 2020-06-05 PROCEDURE — 85610 PROTHROMBIN TIME: CPT

## 2020-06-05 PROCEDURE — 85230 CLOT FACTOR VII PROCONVERTIN: CPT

## 2020-06-05 PROCEDURE — 83735 ASSAY OF MAGNESIUM: CPT

## 2020-06-05 PROCEDURE — 82553 CREATINE MB FRACTION: CPT

## 2020-06-05 PROCEDURE — 97112 NEUROMUSCULAR REEDUCATION: CPT

## 2020-06-05 PROCEDURE — 73562 X-RAY EXAM OF KNEE 3: CPT

## 2020-06-05 PROCEDURE — 85270 CLOT FACTOR XI PTA: CPT

## 2020-06-05 PROCEDURE — 97163 PT EVAL HIGH COMPLEX 45 MIN: CPT

## 2020-06-05 PROCEDURE — 83930 ASSAY OF BLOOD OSMOLALITY: CPT

## 2020-06-05 PROCEDURE — 81001 URINALYSIS AUTO W/SCOPE: CPT

## 2020-06-05 PROCEDURE — 97116 GAIT TRAINING THERAPY: CPT

## 2020-06-05 PROCEDURE — 73610 X-RAY EXAM OF ANKLE: CPT

## 2020-06-05 PROCEDURE — 85247 CLOT FACTOR VIII MULTIMETRIC: CPT

## 2020-06-05 PROCEDURE — 82728 ASSAY OF FERRITIN: CPT

## 2020-06-05 PROCEDURE — 93971 EXTREMITY STUDY: CPT

## 2020-06-05 PROCEDURE — 97167 OT EVAL HIGH COMPLEX 60 MIN: CPT

## 2020-06-05 PROCEDURE — 87635 SARS-COV-2 COVID-19 AMP PRB: CPT

## 2020-06-05 PROCEDURE — 85220 BLOOC CLOT FACTOR V TEST: CPT

## 2020-06-05 PROCEDURE — 93005 ELECTROCARDIOGRAM TRACING: CPT

## 2020-06-05 PROCEDURE — 97110 THERAPEUTIC EXERCISES: CPT

## 2020-06-05 PROCEDURE — 80048 BASIC METABOLIC PNL TOTAL CA: CPT

## 2020-06-05 PROCEDURE — 87186 SC STD MICRODIL/AGAR DIL: CPT

## 2020-06-05 PROCEDURE — 85260 CLOT FACTOR X STUART-POWER: CPT

## 2020-06-05 PROCEDURE — 85291 CLOT FACTOR XIII FIBRIN SCRN: CPT

## 2020-06-05 PROCEDURE — 82962 GLUCOSE BLOOD TEST: CPT

## 2020-06-05 PROCEDURE — 85240 CLOT FACTOR VIII AHG 1 STAGE: CPT

## 2020-06-05 PROCEDURE — 84100 ASSAY OF PHOSPHORUS: CPT

## 2020-06-05 PROCEDURE — 97129 THER IVNTJ 1ST 15 MIN: CPT

## 2020-06-05 PROCEDURE — 93970 EXTREMITY STUDY: CPT

## 2020-06-05 PROCEDURE — 70450 CT HEAD/BRAIN W/O DYE: CPT

## 2020-06-05 PROCEDURE — 85730 THROMBOPLASTIN TIME PARTIAL: CPT

## 2020-06-05 PROCEDURE — 73700 CT LOWER EXTREMITY W/O DYE: CPT

## 2020-06-05 PROCEDURE — 82550 ASSAY OF CK (CPK): CPT

## 2020-06-05 PROCEDURE — 85210 CLOT FACTOR II PROTHROM SPEC: CPT

## 2020-06-05 PROCEDURE — U0003: CPT

## 2020-06-05 PROCEDURE — 85245 CLOT FACTOR VIII VW RISTOCTN: CPT

## 2020-06-05 PROCEDURE — 92610 EVALUATE SWALLOWING FUNCTION: CPT

## 2020-06-05 PROCEDURE — 97530 THERAPEUTIC ACTIVITIES: CPT

## 2020-06-05 PROCEDURE — 84300 ASSAY OF URINE SODIUM: CPT

## 2020-06-05 PROCEDURE — 83529 ASAY OF INTERLEUKIN-6 (IL-6): CPT

## 2020-06-05 PROCEDURE — 85027 COMPLETE CBC AUTOMATED: CPT

## 2020-06-05 PROCEDURE — 85290 CLOT FACTOR XIII FIBRIN STAB: CPT

## 2020-06-05 PROCEDURE — 83935 ASSAY OF URINE OSMOLALITY: CPT

## 2020-06-05 PROCEDURE — 87086 URINE CULTURE/COLONY COUNT: CPT

## 2020-06-05 PROCEDURE — 92507 TX SP LANG VOICE COMM INDIV: CPT

## 2020-06-05 PROCEDURE — 86140 C-REACTIVE PROTEIN: CPT

## 2020-06-05 PROCEDURE — 85250 CLOT FACTOR IX PTC/CHRSTMAS: CPT

## 2020-06-05 PROCEDURE — 85280 CLOT FACTOR XII HAGEMAN: CPT

## 2020-06-05 PROCEDURE — 36415 COLL VENOUS BLD VENIPUNCTURE: CPT

## 2020-06-05 PROCEDURE — 92523 SPEECH SOUND LANG COMPREHEN: CPT

## 2020-06-05 PROCEDURE — 80053 COMPREHEN METABOLIC PANEL: CPT

## 2020-06-05 PROCEDURE — 93923 UPR/LXTR ART STDY 3+ LVLS: CPT

## 2020-06-05 PROCEDURE — 84484 ASSAY OF TROPONIN QUANT: CPT

## 2020-06-05 PROCEDURE — 85379 FIBRIN DEGRADATION QUANT: CPT

## 2020-06-05 PROCEDURE — 97535 SELF CARE MNGMENT TRAINING: CPT

## 2020-06-22 ENCOUNTER — NON-APPOINTMENT (OUTPATIENT)
Age: 70
End: 2020-06-22

## 2020-06-22 ENCOUNTER — APPOINTMENT (OUTPATIENT)
Dept: CARDIOLOGY | Facility: CLINIC | Age: 70
End: 2020-06-22
Payer: MEDICARE

## 2020-06-22 VITALS — DIASTOLIC BLOOD PRESSURE: 72 MMHG | SYSTOLIC BLOOD PRESSURE: 121 MMHG

## 2020-06-22 VITALS
HEIGHT: 59 IN | DIASTOLIC BLOOD PRESSURE: 70 MMHG | TEMPERATURE: 98.7 F | SYSTOLIC BLOOD PRESSURE: 121 MMHG | OXYGEN SATURATION: 97 % | WEIGHT: 142 LBS | BODY MASS INDEX: 28.63 KG/M2 | HEART RATE: 66 BPM

## 2020-06-22 DIAGNOSIS — K21.9 GASTRO-ESOPHAGEAL REFLUX DISEASE W/OUT ESOPHAGITIS: ICD-10-CM

## 2020-06-22 PROCEDURE — 93000 ELECTROCARDIOGRAM COMPLETE: CPT

## 2020-06-22 PROCEDURE — 99214 OFFICE O/P EST MOD 30 MIN: CPT

## 2020-06-22 RX ORDER — NITROFURANTOIN (MONOHYDRATE/MACROCRYSTALS) 25; 75 MG/1; MG/1
100 CAPSULE ORAL TWICE DAILY
Qty: 10 | Refills: 0 | Status: DISCONTINUED | COMMUNITY
Start: 2019-12-06 | End: 2020-06-22

## 2020-06-22 RX ORDER — FLUOXETINE HYDROCHLORIDE 20 MG/1
20 CAPSULE ORAL
Qty: 30 | Refills: 0 | Status: DISCONTINUED | COMMUNITY
Start: 2020-05-28

## 2020-06-22 RX ORDER — INSULIN ZINC HUMAN RECOMBINANT 100/ML
100 VIAL (ML) SUBCUTANEOUS
Refills: 0 | Status: DISCONTINUED | COMMUNITY
End: 2020-06-22

## 2020-06-22 RX ORDER — IBUPROFEN 600 MG/1
600 TABLET, FILM COATED ORAL EVERY 6 HOURS
Qty: 30 | Refills: 0 | Status: DISCONTINUED | COMMUNITY
Start: 2019-12-17 | End: 2020-06-22

## 2020-06-22 RX ORDER — OXYCODONE AND ACETAMINOPHEN 5; 325 MG/1; MG/1
5-325 TABLET ORAL
Qty: 10 | Refills: 0 | Status: DISCONTINUED | COMMUNITY
Start: 2019-12-06 | End: 2020-06-22

## 2020-06-22 RX ORDER — NITROFURANTOIN (MONOHYDRATE/MACROCRYSTALS) 25; 75 MG/1; MG/1
100 CAPSULE ORAL TWICE DAILY
Qty: 14 | Refills: 0 | Status: DISCONTINUED | COMMUNITY
Start: 2019-08-26 | End: 2020-06-22

## 2020-06-22 RX ORDER — OXYBUTYNIN CHLORIDE 10 MG/1
10 TABLET, EXTENDED RELEASE ORAL
Qty: 30 | Refills: 2 | Status: DISCONTINUED | COMMUNITY
Start: 2019-07-10 | End: 2020-06-22

## 2020-06-22 RX ORDER — HYDRALAZINE HYDROCHLORIDE 50 MG/1
50 TABLET ORAL
Qty: 30 | Refills: 0 | Status: DISCONTINUED | COMMUNITY
Start: 2020-05-28

## 2020-06-22 RX ORDER — CALCIUM CARBONATE/VITAMIN D3 600MG-5MCG
600-200 TABLET ORAL
Refills: 0 | Status: DISCONTINUED | COMMUNITY
End: 2020-06-22

## 2020-06-22 RX ORDER — GLYBURIDE 5 MG/1
5 TABLET ORAL
Refills: 0 | Status: DISCONTINUED | COMMUNITY
End: 2020-06-22

## 2020-06-22 RX ORDER — METFORMIN HYDROCHLORIDE 1000 MG/1
1000 TABLET, COATED ORAL
Refills: 0 | Status: ACTIVE | COMMUNITY
Start: 2020-05-28

## 2020-06-22 NOTE — PHYSICAL EXAM
[General Appearance - Well Nourished] : well nourished [General Appearance - Well Developed] : well developed [FreeTextEntry1] : No JVD [Normal Conjunctiva] : the conjunctiva exhibited no abnormalities [Respiration, Rhythm And Depth] : normal respiratory rhythm and effort [Heart Rate And Rhythm] : heart rate and rhythm were normal [Heart Sounds] : normal S1 and S2 [Bowel Sounds] : normal bowel sounds [Auscultation Breath Sounds / Voice Sounds] : lungs were clear to auscultation bilaterally [Abdomen Soft] : soft [Cyanosis, Localized] : no localized cyanosis

## 2020-06-22 NOTE — REASON FOR VISIT
[Consultation] : a consultation regarding [[employer] ] : provided by [employer]   [Family Member] : family member [TWNoteComboBox1] : [place] [FreeTextEntry2] : Juan David

## 2020-06-22 NOTE — HISTORY OF PRESENT ILLNESS
[FreeTextEntry1] : Patients [facility] records reviewed and summarized as follows.\par : Juan David\par \par Patient is a 69 year old Singaporean speaking woman with HTN, HLD, DM, prior CVA, prior ABI/BSO, RBBB who presents with COVID-19 viral pneumonia and found to have Mobitz II block who is now s/p successful Medtronic Micra AV+ leadless pacemaker implantation via right femoral vein. \par Patient is a poor informant, Patient c/o vague nonexertional chest discomfort and some shortness of breath, unable to give details. Patient denies any palpitations or syncope. Patient denies orthopnea or PND or leg edema\par \par Niece with the patient\par \par

## 2020-06-22 NOTE — ASSESSMENT
[FreeTextEntry1] : Summary:\par  1. Bubble study only to evaluate for shunting.\par  2. Left ventricular ejection fraction, by visual estimation, is >75%.\par  3. Normal global left ventricular systolic function.\par  4. Color flow doppler and intravenous injection of agitated saline demonstrates the presence of an intact intra atrial septum.\par \par MD Lance Electronically signed on 4/17/2020 at 1:10:57 PM\par \par \par EKG 6/22/2020- NSR, RBBB\par \par Assessment:\par 1. Chest pain\par 2. Mobitz 2 heart block S/P successful Medtronic Micra AV+ leadless pacemaker implantation \par 3. HTN/DM/HLD/CVA and other problems as noted in the history\par \par Recommendations:\par 1. Patients PPM to be checked.\par 2. Pharm nuclear stress test\par 3. F/U after the test

## 2020-07-07 ENCOUNTER — APPOINTMENT (OUTPATIENT)
Dept: PHYSICAL MEDICINE AND REHAB | Facility: CLINIC | Age: 70
End: 2020-07-07

## 2020-07-13 ENCOUNTER — APPOINTMENT (OUTPATIENT)
Dept: CARDIOLOGY | Facility: CLINIC | Age: 70
End: 2020-07-13
Payer: MEDICARE

## 2020-07-13 PROCEDURE — 93015 CV STRESS TEST SUPVJ I&R: CPT

## 2020-07-13 PROCEDURE — 78452 HT MUSCLE IMAGE SPECT MULT: CPT

## 2020-07-13 PROCEDURE — A9500: CPT

## 2020-12-18 ENCOUNTER — APPOINTMENT (OUTPATIENT)
Dept: CARDIOLOGY | Facility: CLINIC | Age: 70
End: 2020-12-18

## 2021-01-25 ENCOUNTER — NON-APPOINTMENT (OUTPATIENT)
Age: 71
End: 2021-01-25

## 2021-01-25 ENCOUNTER — APPOINTMENT (OUTPATIENT)
Dept: CARDIOLOGY | Facility: CLINIC | Age: 71
End: 2021-01-25
Payer: MEDICARE

## 2021-01-25 VITALS
TEMPERATURE: 98.9 F | DIASTOLIC BLOOD PRESSURE: 73 MMHG | SYSTOLIC BLOOD PRESSURE: 125 MMHG | WEIGHT: 141 LBS | BODY MASS INDEX: 28.43 KG/M2 | HEART RATE: 59 BPM | HEIGHT: 59 IN | OXYGEN SATURATION: 97 %

## 2021-01-25 PROCEDURE — 99213 OFFICE O/P EST LOW 20 MIN: CPT

## 2021-01-25 PROCEDURE — 99072 ADDL SUPL MATRL&STAF TM PHE: CPT

## 2021-01-25 PROCEDURE — 93000 ELECTROCARDIOGRAM COMPLETE: CPT

## 2021-01-25 RX ORDER — HYDROCHLOROTHIAZIDE 25 MG/1
25 TABLET ORAL
Refills: 0 | Status: DISCONTINUED | COMMUNITY
End: 2021-01-25

## 2021-01-25 RX ORDER — SIMVASTATIN 20 MG/1
20 TABLET, FILM COATED ORAL
Qty: 90 | Refills: 1 | Status: DISCONTINUED | COMMUNITY
End: 2021-01-25

## 2021-01-25 NOTE — HISTORY OF PRESENT ILLNESS
[FreeTextEntry1] : \par : Nneka, ID#- [id number]\par \par Patient is a 70 year old Bruneian speaking woman with HTN, HLD, DM, prior CVA, prior ABI/BSO, RBBB who presents with COVID-19 viral pneumonia  in  May/June 2020 and  was found to have Mobitz II block is  s/p successful Medtronic Micra AV+ leadless pacemaker implantation via right femoral vein. \par \par Patient is feeling fine.  Patient has no cardiopulmonary complaints.  s. Patient denies any palpitations or syncope. Patient denies orthopnea or PND or leg edema\par \par \par \par

## 2021-01-25 NOTE — ASSESSMENT
[FreeTextEntry1] : Summary:\par  1. Bubble study only to evaluate for shunting.\par  2. Left ventricular ejection fraction, by visual estimation, is >75%.\par  3. Normal global left ventricular systolic function.\par  4. Color flow doppler and intravenous injection of agitated saline demonstrates the presence of an intact intra atrial septum.\par \par MD Lance Electronically signed on 4/17/2020 at 1:10:57 PM\par \par \par EKG 6/22/2020- NSR, RBBB\par \par Pharmacological nuclear stress test: July 13, 2020: Normal nuclear stress test.\par \par EKG 1/25/2021- Sinus  Rhythm \par -Right bundle branch block. . Unchanged from prior EKG\par \par ABNORMAL \par Assessment:\par 1. Chest pain-resolved.  Normal nuclear stress test\par 2. Mobitz 2 heart block S/P successful Medtronic Micra AV+ leadless pacemaker implantation \par 3. HTN/DM/HLD/CVA and other problems as noted in the history\par \par Recommendations:\par 1. Patients PPM to be checked.\par 2.  Follow-up in 6 months

## 2021-01-25 NOTE — PHYSICAL EXAM
[General Appearance - Well Developed] : well developed [General Appearance - Well Nourished] : well nourished [Normal Conjunctiva] : the conjunctiva exhibited no abnormalities [Respiration, Rhythm And Depth] : normal respiratory rhythm and effort [Auscultation Breath Sounds / Voice Sounds] : lungs were clear to auscultation bilaterally [Heart Rate And Rhythm] : heart rate and rhythm were normal [Heart Sounds] : normal S1 and S2 [Bowel Sounds] : normal bowel sounds [Abdomen Soft] : soft [Cyanosis, Localized] : no localized cyanosis [FreeTextEntry1] : No JVD

## 2021-01-25 NOTE — REASON FOR VISIT
[Consultation] : a consultation regarding [Family Member] : family member [[employer] ] : provided by [employer]   [FreeTextEntry2] : Juan David [TWNoteComboBox1] : Slovak

## 2021-02-04 ENCOUNTER — EMERGENCY (EMERGENCY)
Facility: HOSPITAL | Age: 71
LOS: 1 days | Discharge: HOME | End: 2021-02-04
Attending: EMERGENCY MEDICINE
Payer: COMMERCIAL

## 2021-02-04 ENCOUNTER — NON-APPOINTMENT (OUTPATIENT)
Age: 71
End: 2021-02-04

## 2021-02-04 VITALS — HEIGHT: 63 IN | WEIGHT: 139.99 LBS

## 2021-02-04 VITALS — HEART RATE: 60 BPM

## 2021-02-04 DIAGNOSIS — Z86.011 PERSONAL HISTORY OF BENIGN NEOPLASM OF THE BRAIN: Chronic | ICD-10-CM

## 2021-02-04 LAB
ALBUMIN SERPL ELPH-MCNC: 4.6 G/DL — SIGNIFICANT CHANGE UP (ref 3.3–5.2)
ALP SERPL-CCNC: 69 U/L — SIGNIFICANT CHANGE UP (ref 40–120)
ALT FLD-CCNC: 11 U/L — SIGNIFICANT CHANGE UP
ANION GAP SERPL CALC-SCNC: 15 MMOL/L — SIGNIFICANT CHANGE UP (ref 5–17)
APTT BLD: 36.3 SEC — HIGH (ref 27.5–35.5)
AST SERPL-CCNC: 13 U/L — SIGNIFICANT CHANGE UP
BASOPHILS # BLD AUTO: 0.05 K/UL — SIGNIFICANT CHANGE UP (ref 0–0.2)
BASOPHILS NFR BLD AUTO: 0.6 % — SIGNIFICANT CHANGE UP (ref 0–2)
BILIRUB SERPL-MCNC: 0.5 MG/DL — SIGNIFICANT CHANGE UP (ref 0.4–2)
BUN SERPL-MCNC: 22 MG/DL — HIGH (ref 8–20)
CALCIUM SERPL-MCNC: 10 MG/DL — SIGNIFICANT CHANGE UP (ref 8.6–10.2)
CHLORIDE SERPL-SCNC: 98 MMOL/L — SIGNIFICANT CHANGE UP (ref 98–107)
CO2 SERPL-SCNC: 26 MMOL/L — SIGNIFICANT CHANGE UP (ref 22–29)
CREAT SERPL-MCNC: 0.64 MG/DL — SIGNIFICANT CHANGE UP (ref 0.5–1.3)
EOSINOPHIL # BLD AUTO: 0.14 K/UL — SIGNIFICANT CHANGE UP (ref 0–0.5)
EOSINOPHIL NFR BLD AUTO: 1.8 % — SIGNIFICANT CHANGE UP (ref 0–6)
GLUCOSE SERPL-MCNC: 265 MG/DL — HIGH (ref 70–99)
HCT VFR BLD CALC: 39.4 % — SIGNIFICANT CHANGE UP (ref 34.5–45)
HGB BLD-MCNC: 13.4 G/DL — SIGNIFICANT CHANGE UP (ref 11.5–15.5)
IMM GRANULOCYTES NFR BLD AUTO: 0.9 % — SIGNIFICANT CHANGE UP (ref 0–1.5)
INR BLD: 1.1 RATIO — SIGNIFICANT CHANGE UP (ref 0.88–1.16)
LYMPHOCYTES # BLD AUTO: 2.56 K/UL — SIGNIFICANT CHANGE UP (ref 1–3.3)
LYMPHOCYTES # BLD AUTO: 32.1 % — SIGNIFICANT CHANGE UP (ref 13–44)
MCHC RBC-ENTMCNC: 29.1 PG — SIGNIFICANT CHANGE UP (ref 27–34)
MCHC RBC-ENTMCNC: 34 GM/DL — SIGNIFICANT CHANGE UP (ref 32–36)
MCV RBC AUTO: 85.7 FL — SIGNIFICANT CHANGE UP (ref 80–100)
MONOCYTES # BLD AUTO: 0.7 K/UL — SIGNIFICANT CHANGE UP (ref 0–0.9)
MONOCYTES NFR BLD AUTO: 8.8 % — SIGNIFICANT CHANGE UP (ref 2–14)
NEUTROPHILS # BLD AUTO: 4.45 K/UL — SIGNIFICANT CHANGE UP (ref 1.8–7.4)
NEUTROPHILS NFR BLD AUTO: 55.8 % — SIGNIFICANT CHANGE UP (ref 43–77)
NT-PROBNP SERPL-SCNC: 543 PG/ML — HIGH (ref 0–300)
PLATELET # BLD AUTO: 349 K/UL — SIGNIFICANT CHANGE UP (ref 150–400)
POTASSIUM SERPL-MCNC: 4.3 MMOL/L — SIGNIFICANT CHANGE UP (ref 3.5–5.3)
POTASSIUM SERPL-SCNC: 4.3 MMOL/L — SIGNIFICANT CHANGE UP (ref 3.5–5.3)
PROT SERPL-MCNC: 7.7 G/DL — SIGNIFICANT CHANGE UP (ref 6.6–8.7)
PROTHROM AB SERPL-ACNC: 12.7 SEC — SIGNIFICANT CHANGE UP (ref 10.6–13.6)
RBC # BLD: 4.6 M/UL — SIGNIFICANT CHANGE UP (ref 3.8–5.2)
RBC # FLD: 13.3 % — SIGNIFICANT CHANGE UP (ref 10.3–14.5)
SODIUM SERPL-SCNC: 138 MMOL/L — SIGNIFICANT CHANGE UP (ref 135–145)
TROPONIN T SERPL-MCNC: <0.01 NG/ML — SIGNIFICANT CHANGE UP (ref 0–0.06)
TROPONIN T SERPL-MCNC: <0.01 NG/ML — SIGNIFICANT CHANGE UP (ref 0–0.06)
WBC # BLD: 7.97 K/UL — SIGNIFICANT CHANGE UP (ref 3.8–10.5)
WBC # FLD AUTO: 7.97 K/UL — SIGNIFICANT CHANGE UP (ref 3.8–10.5)

## 2021-02-04 PROCEDURE — 84484 ASSAY OF TROPONIN QUANT: CPT

## 2021-02-04 PROCEDURE — 85730 THROMBOPLASTIN TIME PARTIAL: CPT

## 2021-02-04 PROCEDURE — 99284 EMERGENCY DEPT VISIT MOD MDM: CPT | Mod: 25

## 2021-02-04 PROCEDURE — 93010 ELECTROCARDIOGRAM REPORT: CPT

## 2021-02-04 PROCEDURE — 71045 X-RAY EXAM CHEST 1 VIEW: CPT

## 2021-02-04 PROCEDURE — 99285 EMERGENCY DEPT VISIT HI MDM: CPT

## 2021-02-04 PROCEDURE — 93005 ELECTROCARDIOGRAM TRACING: CPT

## 2021-02-04 PROCEDURE — 36415 COLL VENOUS BLD VENIPUNCTURE: CPT

## 2021-02-04 PROCEDURE — 85610 PROTHROMBIN TIME: CPT

## 2021-02-04 PROCEDURE — 80053 COMPREHEN METABOLIC PANEL: CPT

## 2021-02-04 PROCEDURE — 83880 ASSAY OF NATRIURETIC PEPTIDE: CPT

## 2021-02-04 PROCEDURE — 71045 X-RAY EXAM CHEST 1 VIEW: CPT | Mod: 26

## 2021-02-04 PROCEDURE — 85025 COMPLETE CBC W/AUTO DIFF WBC: CPT

## 2021-02-04 NOTE — ED PROVIDER NOTE - NS ED ROS FT
Review of Systems  •	CONSTITUTIONAL - no  fever, no diaphoresis, no weight change  •	SKIN - no rash  •	HEMATOLOGIC - no bleeding, no bruising  •	EYES - no eye pain, no blurred vision  •	ENT - no change in hearing, no pain  •	RESPIRATORY - no shortness of breath, no cough  •	CARDIAC -  (+) chest pain, no palpitations  •	GI - no abd pain, no nausea, no vomiting, no diarrhea, no constipation, no bleeding  •	GENITO-URINARY - no discharge, no dysuria; no hematuria,   •	ENDO - no polydipsia, no polyuria, no heat/no cold intolerance  •	MUSCULOSKELETAL - no joint pain, no swelling, no redness  •	NEUROLOGIC - no weakness, no headache, no anesthesia, no paresthesias  •	PSYCH - no anxiety, non suicidal, non homicidal, no hallucination, no depression

## 2021-02-04 NOTE — ED PROVIDER NOTE - OBJECTIVE STATEMENT
71 yo F hx of HTN, HDL, DM2, CVA, GERD, RBBB, heart block with pacemaker p/w chest pain x 3 days. patient report feeling her pacemaker. no fever. no sob.     : Erik

## 2021-02-04 NOTE — CONSULT NOTE ADULT - SUBJECTIVE AND OBJECTIVE BOX
[facility]                                                               Office:  [address] [address]                                                              Telephone: [phone]. Fax:[phone]                                                                        CARDIOLOGY CONSULTATION NOTE                                                                                             Consult requested by:  Dr. Kirk  Reason for Consultation: Chest Pain  History obtained by: Patient and medical record   obtained: Yes, ED  Debbie.     Chief complaint:    Patient is a 70y old  Female who presents with a chief complaint of chest pain.       HPI: Patient is a 69 y/o F with a PMHx of HTN, DMII, HLD, prior CVA, RBBB, Covid PNA c/b Mobitz type II s/p Micra PPM who was sent from Dr. Allen office for complaints of chest pain. Patient states that for the last 3 days she has been getting 4/10 chest pain that lasts for a few seconds, and then goes away. Patient states that the pain occurs at rest, non-exertional, non-radiating, and very brief. Patient states it happened about 6 times yesterday. Patient went to Dr. Allen's office because of the chest pain, and was advised to be evaluated in the ER. Patient is currently chest pain free, no other complaints. Patient of note had a NST 07/2020 with no ischemia. Patient denies fevers, chills, SOB, orthopnea PND, LE edema, abdominal pain, N/V/D, headache, or dizziness.     REVIEW OF SYMPTOMS:     CONSTITUTIONAL: No fever, weight loss, or fatigue  ENMT:  No difficulty hearing, tinnitus, vertigo; No sinus or throat pain  NECK: No pain or stiffness  CARDIOVASCULAR: AS PER HPI  RESPIRATORY: No Dyspnea on exertion, Shortness of breath, cough, wheezing  : No dysuria, no hematuria   GI: No dark color stool, no melena, no diarrhea, no constipation, no abdominal pain   NEURO: No headache, no dizziness, no slurred speech   MUSCULOSKELETAL: No joint pain or swelling; No muscle, back, or extremity pain  PSYCH: No agitation, no anxiety.    ALL OTHER REVIEW OF SYSTEMS ARE NEGATIVE.      PREVIOUS DIAGNOSTIC TESTING  ECHO FINDINGS:  < from: TTE Echo Complete w/ Contrast w/ Doppler (04.17.20 @ 10:00) >  PHYSICIAN INTERPRETATION:  Left Ventricle: The left ventricular internal cavity size is normal.  Global LV systolic function was normal. Left ventricular ejection fraction, by visual estimation, is >75%.  Left Atrium: Color flow doppler and intravenous injection of agitated saline demonstrates the presence of an intact intra atrial septum.  Shunts: Agitated saline contrast was given intravenously to evaluate for intracardiac shunting.       Summary:   1. Bubble study only to evaluate for shunting.   2. Left ventricular ejection fraction, by visual estimation, is >75%.   3. Normal global left ventricular systolic function.   4. Color flow doppler and intravenous injection of agitated saline demonstrates the presence of an intact intra atrial septum.    MD Lance Electronically signed on 4/17/2020 at 1:10:57 PM    < end of copied text >      STRESS FINDINGS:  Nuclear Stress Test 07/13/2020  No evidence of myocardial infarction or ischemia.     ALLERGIES: Allergies    No Known Allergies    Intolerances        PAST MEDICAL HISTORY  H/O gastroesophageal reflux (GERD)    Acute otitis media, unspecified otitis media type    HTN (hypertension)    DM (diabetes mellitus)        PAST SURGICAL HISTORY  History of benign brain tumor        FAMILY HISTORY:  FH: asthma  Son        SOCIAL HISTORY:  Denies smoking/alcohol/drugs        CURRENT MEDICATIONS:         HOME MEDICATIONS:  Home Medications:  acetaminophen 325 mg oral tablet: 2 tab(s) orally every 6 hours, As needed, Temp greater or equal to 38C (100.4F), Mild Pain (1 - 3) (22 May 2020 08:20)  aspirin 81 mg oral delayed release tablet: 1 tab(s) orally once a day (22 May 2020 08:20)  benzonatate 100 mg oral capsule: 1 cap(s) orally every 8 hours, As needed, Cough (22 May 2020 08:20)  famotidine 20 mg oral tablet: 1 tab(s) orally once a day (22 May 2020 08:20)  lactulose 10 g/15 mL oral syrup: 15 milliliter(s) orally once a day, As needed, constipation (22 May 2020 08:20)  lidocaine topical:  topically 1 daily to painful area. On for 12 hours and off for 12 hours (22 May 2020 08:20)  melatonin 3 mg oral tablet: 1 tab(s) orally once a day (at bedtime) (22 May 2020 08:20)  polyethylene glycol 3350 oral powder for reconstitution: 17 gram(s) orally once a day (22 May 2020 08:20)  simethicone 80 mg oral tablet, chewable: 1 tab(s) orally every 8 hours (28 May 2020 16:18)  sodium chloride 0.65% nasal spray:  nasal  (22 May 2020 08:20)      Vital Signs Last 24 Hrs  T(C): 36.6 (04 Feb 2021 10:19), Max: 36.6 (04 Feb 2021 10:19)  T(F): 97.8 (04 Feb 2021 10:19), Max: 97.8 (04 Feb 2021 10:19)  HR: 60 (04 Feb 2021 10:47) (60 - 71)  BP: 144/84 (04 Feb 2021 10:19) (144/84 - 144/84)  RR: 20 (04 Feb 2021 10:19) (20 - 20)  SpO2: 97% (04 Feb 2021 10:19) (97% - 97%)      PHYSICAL EXAM:  Constitutional: Comfortable . No acute distress.   HEENT: Atraumatic and normocephalic , neck is supple . no JVD. No carotid bruit. PEERL   CNS: A&Ox3. No focal deficits. EOMI. Cranial nerves II-IX are intact.   Lymph Nodes: Cervical : Not palpable.  Respiratory: CTAB  Cardiovascular: S1S2 RRR. No murmur/rubs or gallop.  Gastrointestinal: Soft non-tender and non distended . +Bowel sounds. negative Cruz's sign.  Extremities: No edema.   Psychiatric: Calm . no agitation.  Skin: No skin rash/ulcers visualized to face, hands or feet.    Intake and output:     LABS:                        13.4   7.97  )-----------( 349      ( 04 Feb 2021 11:11 )             39.4     02-04    138  |  98  |  22.0<H>  ----------------------------<  265<H>  4.3   |  26.0  |  0.64    Ca    10.0      04 Feb 2021 11:11    TPro  7.7  /  Alb  4.6  /  TBili  0.5  /  DBili  x   /  AST  13  /  ALT  11  /  AlkPhos  69  02-04    CARDIAC MARKERS ( 04 Feb 2021 11:11 )  x     / <0.01 ng/mL / x     / x     / x        ;p-BNP=Serum Pro-Brain Natriuretic Peptide: 543 pg/mL (02-04 @ 11:11)    PT/INR - ( 04 Feb 2021 11:11 )   PT: 12.7 sec;   INR: 1.10 ratio         PTT - ( 04 Feb 2021 11:11 )  PTT:36.3 sec      INTERPRETATION OF TELEMETRY: Reviewed by me. V-paced  ECG: Reviewed by me. V-paced with underlying complete heart block    RADIOLOGY & ADDITIONAL STUDIES:    X-ray:  reviewed by me.   < from: Xray Chest 1 View- PORTABLE-Urgent (02.04.21 @ 11:09) >   EXAM:  XR CHEST PORTABLE URGENT 1V                          PROCEDURE DATE:  02/04/2021          INTERPRETATION:  Portable chest radiograph    CLINICAL INFORMATION: Chest pain    TECHNIQUE:  Portable  AP view of the chest was obtained.    COMPARISON: 4/17/2020 chest available for review.    FINDINGS:  The lungs are clear of airspace consolidations or effusions. No pneumothorax.    The heart and mediastinum are within normal limits.    Visualized osseous structures are intact.        IMPRESSION:   No evidence of active chest disease.    < end of copied text >

## 2021-02-04 NOTE — ED PROVIDER NOTE - DATE/TIME 2
04-Feb-2021 15:46 Xelcalebz Pregnancy And Lactation Text: This medication is Pregnancy Category D and is not considered safe during pregnancy.  The risk during breast feeding is also uncertain.

## 2021-02-04 NOTE — CONSULT NOTE ADULT - ASSESSMENT
A/P: Patient is a 69 y/o F with a PMHx of HTN, DMII, HLD, prior CVA, RBBB, Covid PNA c/b Mobitz type II s/p Micra PPM who was sent from Dr. Allen office for complaints of chest pain. Patient states that for the last 3 days she has been getting 4/10 chest pain that lasts for a few seconds, and then goes away. Patient states that the pain occurs at rest, non-exertional, non-radiating, and very brief. Patient states it happened about 6 times yesterday. Patient went to Dr. Allen's office because of the chest pain, and was advised to be evaluated in the ER. Patient is currently chest pain free, no other complaints. Patient of note had a NST 07/2020 with no ischemia. Patient denies fevers, chills, SOB, orthopnea PND, LE edema, abdominal pain, N/V/D, headache, or dizziness.   Troponin negative x 1    Chest Pain  - Atypical chest pain.   - Troponin negative x 1, continue to trend.   - Recent NST 07/2020 with no ischemia.   - If troponins are negative, can D/C home with outpatient ischemic evaluation in 2 weeks with Dr. Allen.     HTN  - Continue home meds.     Assessment and recommendations are final when note is signed by the attending.

## 2021-02-04 NOTE — ED PROVIDER NOTE - PHYSICAL EXAMINATION
VITAL SIGNS: I have reviewed nursing notes and confirm.  CONSTITUTIONAL: Well-developed; well-nourished; in no acute distress.  SKIN: Skin exam is warm and dry, no acute rash.  HEAD: Normocephalic; atraumatic.  EYES: PERRL, EOM intact; conjunctiva and sclera clear.  ENT: No nasal discharge; airway clear. Throat clear.  NECK: Supple; non tender.    CARD: S1, S2 normal; Regular rate and rhythm.  RESP: No wheezes,  no rales or rhonchi.   ABD:  soft; non-distended; non-tender;   EXT: Normal ROM. No clubbing, cyanosis or edema.  NEURO: Alert, oriented. Grossly unremarkable. No focal deficits. no facial droop, moves all extremities,  normal gait

## 2021-02-04 NOTE — ED PROVIDER NOTE - NSFOLLOWUPINSTRUCTIONS_ED_ALL_ED_FT
Dolor de pecho    El dolor torácico puede ser causado por muchas afecciones diferentes que pueden o no ser peligrosas. Las causas incluyen ardor de estómago, infecciones pulmonares, ataque cardíaco, coágulos sanguíneos en los pulmones, infecciones de la piel, tensión o daño al músculo, cartílago, o huesos, etc. Además de la historia clínica y el examen físico, se puede trevor realizado un electrocardiograma (ECG) u otras pruebas de laboratorio para determinar la causa del dolor torácico. Anika un seguimiento con johnson proveedor de atención primaria o con un cardiólogo según las instrucciones.     BUSCA CUIDADO MEDICAL INMEDIATO SI TIENE CUALQUIERA DE LOS SIGUIENTES SYMPTOMS: empeoramiento del dolor torácico, tos con windy, dolor inexplicable de espalda/sonia/mandíbula, dolor abdominal intenso, mareos o aturdimiento, desmayos, dificultad para respirar, piel sudorosa o húmeda, vómitos o latidos cardíacos acelerados. Estos síntomas pueden representar un problema grave que es gonzalo emergencia. No espere a afsaneh si los síntomas desaparecerán. Obtenga ayuda médica de inmediato. Llame al 911 y no conduzca hasta el hospital.

## 2021-02-04 NOTE — ED ADULT NURSE NOTE - OBJECTIVE STATEMENT
called to bedside. 70  called to bedside. 69 y/o f a&ox3, poor historian comes to ED c/o chest pain x3 days. pt. denies pain now. pt. recently had a pace maker placed. MD. Kirk at bedside.

## 2021-02-04 NOTE — ED PROVIDER NOTE - PROGRESS NOTE DETAILS
EKG reviewed by [facility], patient is paced but with occasional native beats and non-functioning native P wave. patient not in 3rd degree block. patient seen by cardiology, cleared for dc if serial trop negative.

## 2021-02-04 NOTE — ED PROVIDER NOTE - PATIENT PORTAL LINK FT
You can access the FollowMyHealth Patient Portal offered by [facility] by registering at the following website: [email or web address]. By joining [facility]’s FollowMyHealth portal, you will also be able to view your health information using other applications (apps) compatible with our system.

## 2021-02-04 NOTE — ED PROVIDER NOTE - CLINICAL SUMMARY MEDICAL DECISION MAKING FREE TEXT BOX
69 yo F recent stress test and pacemaker p/w chest pain x 3 days. EKG with paced and native beats. trop negative x 2. patient seen by cardiology and cleared for discharge.

## 2021-02-23 ENCOUNTER — TRANSCRIPTION ENCOUNTER (OUTPATIENT)
Age: 71
End: 2021-02-23

## 2021-02-24 ENCOUNTER — TRANSCRIPTION ENCOUNTER (OUTPATIENT)
Age: 71
End: 2021-02-24

## 2021-08-29 ENCOUNTER — EMERGENCY (EMERGENCY)
Facility: HOSPITAL | Age: 71
LOS: 1 days | Discharge: HOME | End: 2021-08-29
Attending: EMERGENCY MEDICINE
Payer: COMMERCIAL

## 2021-08-29 VITALS
DIASTOLIC BLOOD PRESSURE: 77 MMHG | WEIGHT: 119.93 LBS | HEIGHT: 63 IN | SYSTOLIC BLOOD PRESSURE: 202 MMHG | OXYGEN SATURATION: 100 % | HEART RATE: 60 BPM | RESPIRATION RATE: 16 BRPM | TEMPERATURE: 98 F

## 2021-08-29 VITALS
RESPIRATION RATE: 18 BRPM | TEMPERATURE: 99 F | OXYGEN SATURATION: 99 % | DIASTOLIC BLOOD PRESSURE: 72 MMHG | SYSTOLIC BLOOD PRESSURE: 142 MMHG | HEART RATE: 64 BPM

## 2021-08-29 DIAGNOSIS — Z86.011 PERSONAL HISTORY OF BENIGN NEOPLASM OF THE BRAIN: Chronic | ICD-10-CM

## 2021-08-29 LAB
ALBUMIN SERPL ELPH-MCNC: 4.7 G/DL — SIGNIFICANT CHANGE UP (ref 3.3–5.2)
ALP SERPL-CCNC: 73 U/L — SIGNIFICANT CHANGE UP (ref 40–120)
ALT FLD-CCNC: 12 U/L — SIGNIFICANT CHANGE UP
ANION GAP SERPL CALC-SCNC: 14 MMOL/L — SIGNIFICANT CHANGE UP (ref 5–17)
ANION GAP SERPL CALC-SCNC: 15 MMOL/L — SIGNIFICANT CHANGE UP (ref 5–17)
APPEARANCE UR: CLEAR — SIGNIFICANT CHANGE UP
AST SERPL-CCNC: 13 U/L — SIGNIFICANT CHANGE UP
BACTERIA # UR AUTO: ABNORMAL
BASE EXCESS BLDV CALC-SCNC: 2.3 MMOL/L — SIGNIFICANT CHANGE UP (ref -2–3)
BASOPHILS # BLD AUTO: 0.06 K/UL — SIGNIFICANT CHANGE UP (ref 0–0.2)
BASOPHILS NFR BLD AUTO: 0.6 % — SIGNIFICANT CHANGE UP (ref 0–2)
BILIRUB SERPL-MCNC: 0.5 MG/DL — SIGNIFICANT CHANGE UP (ref 0.4–2)
BILIRUB UR-MCNC: NEGATIVE — SIGNIFICANT CHANGE UP
BUN SERPL-MCNC: 11.6 MG/DL — SIGNIFICANT CHANGE UP (ref 8–20)
BUN SERPL-MCNC: 13.5 MG/DL — SIGNIFICANT CHANGE UP (ref 8–20)
CA-I SERPL-SCNC: 1.2 MMOL/L — SIGNIFICANT CHANGE UP (ref 1.15–1.33)
CALCIUM SERPL-MCNC: 9.5 MG/DL — SIGNIFICANT CHANGE UP (ref 8.6–10.2)
CALCIUM SERPL-MCNC: 9.9 MG/DL — SIGNIFICANT CHANGE UP (ref 8.6–10.2)
CHLORIDE BLDV-SCNC: 100 MMOL/L — SIGNIFICANT CHANGE UP (ref 98–107)
CHLORIDE SERPL-SCNC: 102 MMOL/L — SIGNIFICANT CHANGE UP (ref 98–107)
CHLORIDE SERPL-SCNC: 98 MMOL/L — SIGNIFICANT CHANGE UP (ref 98–107)
CO2 SERPL-SCNC: 24 MMOL/L — SIGNIFICANT CHANGE UP (ref 22–29)
CO2 SERPL-SCNC: 26 MMOL/L — SIGNIFICANT CHANGE UP (ref 22–29)
COLOR SPEC: YELLOW — SIGNIFICANT CHANGE UP
CREAT SERPL-MCNC: 0.58 MG/DL — SIGNIFICANT CHANGE UP (ref 0.5–1.3)
CREAT SERPL-MCNC: 0.69 MG/DL — SIGNIFICANT CHANGE UP (ref 0.5–1.3)
DIFF PNL FLD: NEGATIVE — SIGNIFICANT CHANGE UP
EOSINOPHIL # BLD AUTO: 0.13 K/UL — SIGNIFICANT CHANGE UP (ref 0–0.5)
EOSINOPHIL NFR BLD AUTO: 1.3 % — SIGNIFICANT CHANGE UP (ref 0–6)
EPI CELLS # UR: SIGNIFICANT CHANGE UP
GAS PNL BLDV: 135 MMOL/L — LOW (ref 136–145)
GAS PNL BLDV: SIGNIFICANT CHANGE UP
GLUCOSE BLDV-MCNC: 321 MG/DL — HIGH (ref 70–99)
GLUCOSE SERPL-MCNC: 178 MG/DL — HIGH (ref 70–99)
GLUCOSE SERPL-MCNC: 302 MG/DL — HIGH (ref 70–99)
GLUCOSE UR QL: 1000 MG/DL
HCO3 BLDV-SCNC: 28 MMOL/L — SIGNIFICANT CHANGE UP (ref 22–29)
HCT VFR BLD CALC: 42.1 % — SIGNIFICANT CHANGE UP (ref 34.5–45)
HCT VFR BLDA CALC: 44 % — SIGNIFICANT CHANGE UP (ref 34–46)
HGB BLD CALC-MCNC: 14.7 G/DL — SIGNIFICANT CHANGE UP (ref 11.7–16.1)
HGB BLD-MCNC: 14 G/DL — SIGNIFICANT CHANGE UP (ref 11.5–15.5)
IMM GRANULOCYTES NFR BLD AUTO: 0.8 % — SIGNIFICANT CHANGE UP (ref 0–1.5)
KETONES UR-MCNC: NEGATIVE — SIGNIFICANT CHANGE UP
LACTATE BLDV-MCNC: 3.2 MMOL/L — HIGH (ref 0.5–2)
LACTATE BLDV-MCNC: 3.6 MMOL/L — HIGH (ref 0.5–2)
LACTATE BLDV-MCNC: 3.9 MMOL/L — HIGH (ref 0.5–2)
LEUKOCYTE ESTERASE UR-ACNC: ABNORMAL
LYMPHOCYTES # BLD AUTO: 3.43 K/UL — HIGH (ref 1–3.3)
LYMPHOCYTES # BLD AUTO: 33.6 % — SIGNIFICANT CHANGE UP (ref 13–44)
MCHC RBC-ENTMCNC: 27.3 PG — SIGNIFICANT CHANGE UP (ref 27–34)
MCHC RBC-ENTMCNC: 33.3 GM/DL — SIGNIFICANT CHANGE UP (ref 32–36)
MCV RBC AUTO: 82.2 FL — SIGNIFICANT CHANGE UP (ref 80–100)
MONOCYTES # BLD AUTO: 0.78 K/UL — SIGNIFICANT CHANGE UP (ref 0–0.9)
MONOCYTES NFR BLD AUTO: 7.6 % — SIGNIFICANT CHANGE UP (ref 2–14)
NEUTROPHILS # BLD AUTO: 5.74 K/UL — SIGNIFICANT CHANGE UP (ref 1.8–7.4)
NEUTROPHILS NFR BLD AUTO: 56.1 % — SIGNIFICANT CHANGE UP (ref 43–77)
NITRITE UR-MCNC: NEGATIVE — SIGNIFICANT CHANGE UP
PCO2 BLDV: 52 MMHG — HIGH (ref 39–42)
PH BLDV: 7.34 — SIGNIFICANT CHANGE UP (ref 7.32–7.43)
PH UR: 6.5 — SIGNIFICANT CHANGE UP (ref 5–8)
PLATELET # BLD AUTO: 381 K/UL — SIGNIFICANT CHANGE UP (ref 150–400)
PO2 BLDV: 67 MMHG — HIGH (ref 25–45)
POTASSIUM BLDV-SCNC: 4.1 MMOL/L — SIGNIFICANT CHANGE UP (ref 3.5–5.1)
POTASSIUM SERPL-MCNC: 3.8 MMOL/L — SIGNIFICANT CHANGE UP (ref 3.5–5.3)
POTASSIUM SERPL-MCNC: 4.3 MMOL/L — SIGNIFICANT CHANGE UP (ref 3.5–5.3)
POTASSIUM SERPL-SCNC: 3.8 MMOL/L — SIGNIFICANT CHANGE UP (ref 3.5–5.3)
POTASSIUM SERPL-SCNC: 4.3 MMOL/L — SIGNIFICANT CHANGE UP (ref 3.5–5.3)
PROT SERPL-MCNC: 8.1 G/DL — SIGNIFICANT CHANGE UP (ref 6.6–8.7)
PROT UR-MCNC: 30 MG/DL
RBC # BLD: 5.12 M/UL — SIGNIFICANT CHANGE UP (ref 3.8–5.2)
RBC # FLD: 13.7 % — SIGNIFICANT CHANGE UP (ref 10.3–14.5)
RBC CASTS # UR COMP ASSIST: NEGATIVE /HPF — SIGNIFICANT CHANGE UP (ref 0–4)
SAO2 % BLDV: 93.1 % — SIGNIFICANT CHANGE UP
SODIUM SERPL-SCNC: 138 MMOL/L — SIGNIFICANT CHANGE UP (ref 135–145)
SODIUM SERPL-SCNC: 140 MMOL/L — SIGNIFICANT CHANGE UP (ref 135–145)
SP GR SPEC: 1.01 — SIGNIFICANT CHANGE UP (ref 1.01–1.02)
UROBILINOGEN FLD QL: NEGATIVE MG/DL — SIGNIFICANT CHANGE UP
WBC # BLD: 10.22 K/UL — SIGNIFICANT CHANGE UP (ref 3.8–10.5)
WBC # FLD AUTO: 10.22 K/UL — SIGNIFICANT CHANGE UP (ref 3.8–10.5)
WBC UR QL: SIGNIFICANT CHANGE UP

## 2021-08-29 PROCEDURE — 83605 ASSAY OF LACTIC ACID: CPT

## 2021-08-29 PROCEDURE — 36415 COLL VENOUS BLD VENIPUNCTURE: CPT

## 2021-08-29 PROCEDURE — 82803 BLOOD GASES ANY COMBINATION: CPT

## 2021-08-29 PROCEDURE — 82330 ASSAY OF CALCIUM: CPT

## 2021-08-29 PROCEDURE — 85014 HEMATOCRIT: CPT

## 2021-08-29 PROCEDURE — 85025 COMPLETE CBC W/AUTO DIFF WBC: CPT

## 2021-08-29 PROCEDURE — 82947 ASSAY GLUCOSE BLOOD QUANT: CPT

## 2021-08-29 PROCEDURE — 81001 URINALYSIS AUTO W/SCOPE: CPT

## 2021-08-29 PROCEDURE — 84295 ASSAY OF SERUM SODIUM: CPT

## 2021-08-29 PROCEDURE — 96361 HYDRATE IV INFUSION ADD-ON: CPT

## 2021-08-29 PROCEDURE — 99285 EMERGENCY DEPT VISIT HI MDM: CPT

## 2021-08-29 PROCEDURE — 87077 CULTURE AEROBIC IDENTIFY: CPT

## 2021-08-29 PROCEDURE — 82962 GLUCOSE BLOOD TEST: CPT

## 2021-08-29 PROCEDURE — 87186 SC STD MICRODIL/AGAR DIL: CPT

## 2021-08-29 PROCEDURE — 93010 ELECTROCARDIOGRAM REPORT: CPT

## 2021-08-29 PROCEDURE — 80048 BASIC METABOLIC PNL TOTAL CA: CPT

## 2021-08-29 PROCEDURE — 82435 ASSAY OF BLOOD CHLORIDE: CPT

## 2021-08-29 PROCEDURE — 84132 ASSAY OF SERUM POTASSIUM: CPT

## 2021-08-29 PROCEDURE — 87086 URINE CULTURE/COLONY COUNT: CPT

## 2021-08-29 PROCEDURE — 85018 HEMOGLOBIN: CPT

## 2021-08-29 PROCEDURE — 96360 HYDRATION IV INFUSION INIT: CPT

## 2021-08-29 PROCEDURE — 80053 COMPREHEN METABOLIC PANEL: CPT

## 2021-08-29 PROCEDURE — 93005 ELECTROCARDIOGRAM TRACING: CPT

## 2021-08-29 PROCEDURE — 99283 EMERGENCY DEPT VISIT LOW MDM: CPT | Mod: 25

## 2021-08-29 RX ORDER — SODIUM CHLORIDE 9 MG/ML
1700 INJECTION, SOLUTION INTRAVENOUS ONCE
Refills: 0 | Status: COMPLETED | OUTPATIENT
Start: 2021-08-29 | End: 2021-08-29

## 2021-08-29 RX ORDER — SODIUM CHLORIDE 9 MG/ML
1000 INJECTION, SOLUTION INTRAVENOUS ONCE
Refills: 0 | Status: COMPLETED | OUTPATIENT
Start: 2021-08-29 | End: 2021-08-29

## 2021-08-29 RX ORDER — ACETAMINOPHEN 500 MG
650 TABLET ORAL ONCE
Refills: 0 | Status: COMPLETED | OUTPATIENT
Start: 2021-08-29 | End: 2021-08-29

## 2021-08-29 RX ORDER — INSULIN HUMAN 100 [IU]/ML
6 INJECTION, SOLUTION SUBCUTANEOUS ONCE
Refills: 0 | Status: COMPLETED | OUTPATIENT
Start: 2021-08-29 | End: 2021-08-29

## 2021-08-29 RX ADMIN — INSULIN HUMAN 6 UNIT(S): 100 INJECTION, SOLUTION SUBCUTANEOUS at 13:55

## 2021-08-29 RX ADMIN — Medication 650 MILLIGRAM(S): at 14:12

## 2021-08-29 RX ADMIN — SODIUM CHLORIDE 1000 MILLILITER(S): 9 INJECTION, SOLUTION INTRAVENOUS at 16:29

## 2021-08-29 RX ADMIN — SODIUM CHLORIDE 1700 MILLILITER(S): 9 INJECTION, SOLUTION INTRAVENOUS at 15:25

## 2021-08-29 RX ADMIN — SODIUM CHLORIDE 1700 MILLILITER(S): 9 INJECTION, SOLUTION INTRAVENOUS at 12:49

## 2021-08-29 RX ADMIN — Medication 650 MILLIGRAM(S): at 13:12

## 2021-08-29 NOTE — ED PROVIDER NOTE - CLINICAL SUMMARY MEDICAL DECISION MAKING FREE TEXT BOX
Pt p/w hyperglycemia, urinary discomfort. Will check urine for UTI get blood fork to r/o DKA, IV fluids for hydration.

## 2021-08-29 NOTE — ED ADULT TRIAGE NOTE - CHIEF COMPLAINT QUOTE
pt c/o high sugar >400 even after taking meds associated with body shakes pt feels thirsty increased urination and both knees/legs have pain.

## 2021-08-29 NOTE — ED ADULT NURSE NOTE - NSIMPLEMENTINTERV_GEN_ALL_ED
Implemented All Fall with Harm Risk Interventions:  [place] to call system. Call bell, personal items and telephone within reach. Instruct patient to call for assistance. Room bathroom lighting operational. Non-slip footwear when patient is off stretcher. Physically safe environment: no spills, clutter or unnecessary equipment. Stretcher in lowest position, wheels locked, appropriate side rails in place. Provide visual cue, wrist band, yellow gown, etc. Monitor gait and stability. Monitor for mental status changes and reorient to person, place, and time. Review medications for side effects contributing to fall risk. Reinforce activity limits and safety measures with patient and family. Provide visual clues: red socks.

## 2021-08-29 NOTE — ED PROVIDER NOTE - PHYSICAL EXAMINATION
VITAL SIGNS: I have reviewed nursing notes and confirm.  CONSTITUTIONAL:  in no acute distress.  SKIN: Skin exam is warm and dry, no acute rash.  HEAD: Normocephalic; atraumatic.  EYES: PERRL, EOM intact; conjunctiva and sclera clear.  ENT: No nasal discharge; airway clear. Throat clear.  NECK: Supple; non tender.    CARD: Regular rate and rhythm.  RESP: No wheezes,  no rales or rhonchi.   ABD:  soft; non-distended; + suprapubic tenderness   EXT: Normal ROM. No clubbing, cyanosis or edema.  NEURO: Alert, oriented. Grossly unremarkable. No focal deficits.  moves all extremities,  normal gait   PSYCH: Cooperative, appropriate.

## 2021-08-29 NOTE — ED PROVIDER NOTE - NS ED ROS FT
Review of Systems  •	CONSTITUTIONAL - no  fever, no diaphoresis, no weight change, + hyperglycemia  •	SKIN - no rash  •	HEMATOLOGIC - no bleeding, no bruising  •	EYES - no eye pain, no blurred vision  •	ENT - no change in hearing, no pain  •	RESPIRATORY - no shortness of breath, no cough  •	CARDIAC - no chest pain, no palpitations  •	GI - + abd pain, no nausea, no vomiting, no diarrhea, no constipation, no bleeding  •	GENITO-URINARY - no discharge, + dysuria; no hematuria,   •	ENDO - no polydipsia, no polyuria, no heat/no cold intolerance  •	MUSCULOSKELETAL - no joint pain, no swelling, no redness  •	NEUROLOGIC - no weakness, no headache, no anesthesia, no paresthesias  •	PSYCH - no anxiety, non suicidal, non homicidal, no hallucination, no depression

## 2021-08-29 NOTE — ED ADULT TRIAGE NOTE - BP NONINVASIVE DIASTOLIC (MM HG)
Spoke with patient on phone. Hx RLL DVT in 2014, ten days post-op a right knee arthroscopy. He was treated with Lovenox, overlapped with Coumadin, and subsequently with Coumadin for 3 month dration. In 2016 he had another surgery on his right knee and was tx with LMWH 40mg sub-q daily for 4 wks. Per PCP request, this pt will again be bridged post-op with LMWH to warfarin for a duration of 4-6 wks, dependent upon pt's recovery of mobility. Recommend INR range be 1.5 to 2.5. In the past, pt's usual warfarin dose was 17.5mg/wk. Today Lab INR 1.8, at goal for preventative DVT, prophylactic Lovneox 40mg sq daily, post procedure on 3/27/19. Patient did not have INR drawn on 4/1/19 as requested. Last dose Lovenox 4/1/19 per patient. Prefer INR near 2.0 with hx DVT. Therefore will increase dose slightly to 20mg/7 days (from 17.5mg/7 days); recheck INR 4/9/19 at [facility] lab. Patient reports has swelling at procedure site (as expected), pain in control, \"doing ok\". Result & dose reviewed by Dr Epperson, onsite provider.   
77

## 2021-08-29 NOTE — ED PROVIDER NOTE - OBJECTIVE STATEMENT
72 y/o female with PMHx of hx of DM on insulin, CVA with right sided weakness, pacemaker, frequent UTI p/w high sugar for past week Pt states she is compliant with meds. PT reports burning urination was on antibiotics a week ago. Pt dn no fever, n/v. pt reports suprapubic tenderness. No chest pain, no SOB

## 2021-08-29 NOTE — ED PROVIDER NOTE - PROGRESS NOTE DETAILS
lactate 3.2 after 2.7 L IVF. patient comfortable and feels better. tolerating PO. patient has pmd for close follow up.

## 2021-09-01 NOTE — ED POST DISCHARGE NOTE - ADDITIONAL DOCUMENTATION
pt granddaughter/ Brown called states . she is with grand mother . states they received the letter , explained about the UTI and need been on antibiotic . close f.u with pcp and ED return precautions

## 2021-09-09 RX ORDER — CEPHALEXIN 500 MG
1 CAPSULE ORAL
Qty: 21 | Refills: 0
Start: 2021-09-09 | End: 2021-09-15

## 2021-11-16 ENCOUNTER — APPOINTMENT (OUTPATIENT)
Dept: UROGYNECOLOGY | Facility: CLINIC | Age: 71
End: 2021-11-16

## 2021-11-16 ENCOUNTER — APPOINTMENT (OUTPATIENT)
Dept: UROGYNECOLOGY | Facility: CLINIC | Age: 71
End: 2021-11-16
Payer: MEDICARE

## 2021-11-16 PROCEDURE — 51798 US URINE CAPACITY MEASURE: CPT

## 2021-11-16 PROCEDURE — 99214 OFFICE O/P EST MOD 30 MIN: CPT | Mod: 25

## 2021-11-16 NOTE — HISTORY OF PRESENT ILLNESS
[FreeTextEntry1] : Patient underwent TVT MUS / cysto in 2019. Prior to sling, had HALEIGH with both DO and GSUI, ISD on UDS. Denies leakage with cough sneeze. Intermittent dysuria, no gross hematuria. Leakage with urgency still present and bothersome. Reports normal BPs. Suffered stroke last year, her second, with residual right-sided changes. No other new PMHx. Also reoprts UTIs with dysuria, symptoms improve with abx treatment. Unclear how often and unclear if ucxs checked.

## 2021-11-16 NOTE — REASON FOR VISIT
[[employer] ] : provided by [employer]   [Interpreters_IDNumber] : [id number] [Interpreters_FullName] : Anna [FreeTextEntry3] : 2nd  after 1st got cut off: Ray [id number] [TWNoteComboBox1] : Iraqi

## 2021-11-16 NOTE — ASSESSMENT
[FreeTextEntry1] : HALEIGH, prior sling. PMHx includes stroke last year. Now with bothersome and persistent OAB. PVR today normal 70 ml, UCx sent - f/u and treat prn. OFELIA vs OAB reviewed. The patient has urinary symptoms consistent with overactive bladder. The etiology of OAB was discussed. Management options including observation, behavioral modifications (dietary changes, monitoring fluid intake, bladder training, timed voids, use of pads/protective garments), kegels, PT, medications, PTNS, SNS, and bladder Botox were all reviewed. She is interested in botox, RTO for 100 units. R/B/A reviewed. All ques answered. Aware will be called for prophylactic abx.\par \par AFter further discussion, she also reports recurrent UTIs and since this is a risk of worsening with botox injections, she will RTO for cysto first to eval and r/o FB given prior surg. All ques answered. \par \par Plan:\par [] RTO for cysto\par [] consider OAB treatment, consider 100 units bladder botox injections\par [] consider recurrent UTI prophylaxis

## 2021-11-16 NOTE — PHYSICAL EXAM
[Chaperone Present] : A chaperone was present in the examining room during all aspects of the physical examination [No Acute Distress] : in no acute distress [Oriented x3] : oriented to person, place, and time [None] : no CVA tenderness [Aa ____] : Aa [unfilled] [C ____] : C [unfilled] [Ap ____] : Ap [unfilled] [] : 0 [Post Void Residual ____ml] : post void residual was [unfilled] ml [Tenderness] : ~T no ~M abdominal tenderness observed [Distended] : not distended [FreeTextEntry4] : no mesh exposure, nontender, no mass lesion or cyst, no POP

## 2021-11-24 ENCOUNTER — INPATIENT (INPATIENT)
Facility: HOSPITAL | Age: 71
LOS: 3 days | Discharge: HOME | DRG: 281 | End: 2021-11-28
Attending: HOSPITALIST | Admitting: STUDENT IN AN ORGANIZED HEALTH CARE EDUCATION/TRAINING PROGRAM
Payer: COMMERCIAL

## 2021-11-24 VITALS
HEART RATE: 60 BPM | SYSTOLIC BLOOD PRESSURE: 179 MMHG | HEIGHT: 63 IN | OXYGEN SATURATION: 95 % | DIASTOLIC BLOOD PRESSURE: 82 MMHG

## 2021-11-24 DIAGNOSIS — Z86.011 PERSONAL HISTORY OF BENIGN NEOPLASM OF THE BRAIN: Chronic | ICD-10-CM

## 2021-11-24 LAB
ALBUMIN SERPL ELPH-MCNC: 4.8 G/DL — SIGNIFICANT CHANGE UP (ref 3.3–5.2)
ALP SERPL-CCNC: 81 U/L — SIGNIFICANT CHANGE UP (ref 40–120)
ALT FLD-CCNC: 16 U/L — SIGNIFICANT CHANGE UP
ANION GAP SERPL CALC-SCNC: 21 MMOL/L — HIGH (ref 5–17)
AST SERPL-CCNC: 19 U/L — SIGNIFICANT CHANGE UP
BASOPHILS # BLD AUTO: 0.06 K/UL — SIGNIFICANT CHANGE UP (ref 0–0.2)
BASOPHILS NFR BLD AUTO: 0.5 % — SIGNIFICANT CHANGE UP (ref 0–2)
BILIRUB SERPL-MCNC: 0.6 MG/DL — SIGNIFICANT CHANGE UP (ref 0.4–2)
BUN SERPL-MCNC: 17 MG/DL — SIGNIFICANT CHANGE UP (ref 8–20)
CALCIUM SERPL-MCNC: 10.2 MG/DL — SIGNIFICANT CHANGE UP (ref 8.6–10.2)
CHLORIDE SERPL-SCNC: 89 MMOL/L — LOW (ref 98–107)
CO2 SERPL-SCNC: 23 MMOL/L — SIGNIFICANT CHANGE UP (ref 22–29)
CREAT SERPL-MCNC: 1.08 MG/DL — SIGNIFICANT CHANGE UP (ref 0.5–1.3)
EOSINOPHIL # BLD AUTO: 0.16 K/UL — SIGNIFICANT CHANGE UP (ref 0–0.5)
EOSINOPHIL NFR BLD AUTO: 1.4 % — SIGNIFICANT CHANGE UP (ref 0–6)
GLUCOSE SERPL-MCNC: 258 MG/DL — HIGH (ref 70–99)
HCT VFR BLD CALC: 43.8 % — SIGNIFICANT CHANGE UP (ref 34.5–45)
HGB BLD-MCNC: 14.8 G/DL — SIGNIFICANT CHANGE UP (ref 11.5–15.5)
IMM GRANULOCYTES NFR BLD AUTO: 0.5 % — SIGNIFICANT CHANGE UP (ref 0–1.5)
LYMPHOCYTES # BLD AUTO: 35.9 % — SIGNIFICANT CHANGE UP (ref 13–44)
LYMPHOCYTES # BLD AUTO: 4.12 K/UL — HIGH (ref 1–3.3)
MAGNESIUM SERPL-MCNC: 1.5 MG/DL — LOW (ref 1.6–2.6)
MCHC RBC-ENTMCNC: 28.1 PG — SIGNIFICANT CHANGE UP (ref 27–34)
MCHC RBC-ENTMCNC: 33.8 GM/DL — SIGNIFICANT CHANGE UP (ref 32–36)
MCV RBC AUTO: 83.1 FL — SIGNIFICANT CHANGE UP (ref 80–100)
MONOCYTES # BLD AUTO: 0.98 K/UL — HIGH (ref 0–0.9)
MONOCYTES NFR BLD AUTO: 8.5 % — SIGNIFICANT CHANGE UP (ref 2–14)
NEUTROPHILS # BLD AUTO: 6.11 K/UL — SIGNIFICANT CHANGE UP (ref 1.8–7.4)
NEUTROPHILS NFR BLD AUTO: 53.2 % — SIGNIFICANT CHANGE UP (ref 43–77)
NT-PROBNP SERPL-SCNC: 339 PG/ML — HIGH (ref 0–300)
PLATELET # BLD AUTO: 365 K/UL — SIGNIFICANT CHANGE UP (ref 150–400)
POTASSIUM SERPL-MCNC: 3.6 MMOL/L — SIGNIFICANT CHANGE UP (ref 3.5–5.3)
POTASSIUM SERPL-SCNC: 3.6 MMOL/L — SIGNIFICANT CHANGE UP (ref 3.5–5.3)
PROT SERPL-MCNC: 8.3 G/DL — SIGNIFICANT CHANGE UP (ref 6.6–8.7)
RBC # BLD: 5.27 M/UL — HIGH (ref 3.8–5.2)
RBC # FLD: 12.9 % — SIGNIFICANT CHANGE UP (ref 10.3–14.5)
SARS-COV-2 RNA SPEC QL NAA+PROBE: SIGNIFICANT CHANGE UP
SODIUM SERPL-SCNC: 133 MMOL/L — LOW (ref 135–145)
TROPONIN T SERPL-MCNC: 0.05 NG/ML — SIGNIFICANT CHANGE UP (ref 0–0.06)
TROPONIN T SERPL-MCNC: 0.14 NG/ML — HIGH (ref 0–0.06)
TROPONIN T SERPL-MCNC: <0.01 NG/ML — SIGNIFICANT CHANGE UP (ref 0–0.06)
WBC # BLD: 11.49 K/UL — HIGH (ref 3.8–10.5)
WBC # FLD AUTO: 11.49 K/UL — HIGH (ref 3.8–10.5)

## 2021-11-24 PROCEDURE — 71045 X-RAY EXAM CHEST 1 VIEW: CPT | Mod: 26

## 2021-11-24 PROCEDURE — 99285 EMERGENCY DEPT VISIT HI MDM: CPT

## 2021-11-24 RX ORDER — KETOROLAC TROMETHAMINE 30 MG/ML
15 SYRINGE (ML) INJECTION ONCE
Refills: 0 | Status: DISCONTINUED | OUTPATIENT
Start: 2021-11-24 | End: 2021-11-24

## 2021-11-24 RX ADMIN — Medication 15 MILLIGRAM(S): at 19:11

## 2021-11-24 RX ADMIN — Medication 15 MILLIGRAM(S): at 20:24

## 2021-11-24 NOTE — ED PROVIDER NOTE - CLINICAL SUMMARY MEDICAL DECISION MAKING FREE TEXT BOX
72yo F w/pmh CAD s/p stents, Mobitz II s/p pacemaker placement, DM, CVA w/ residual R-sided deficits and delayed speech presents for chest pain similar to prior ischemic pain. EKG 60bpm w/ LBBB, likely paced. Labs, CXR pending. 70yo F w/pmh CAD s/p stents, Mobitz II s/p pacemaker placement, DM, CVA w/ residual R-sided deficits and delayed speech presents for chest pain similar to prior ischemic pain. Received 324 ASA and 1 NG from EMS. EKG 60bpm w/ LBBB, likely paced. Labs, CXR pending. 70yo F w/pmh CAD s/p stents, Mobitz II s/p pacemaker placement, DM, CVA w/ residual R-sided deficits and delayed speech presents for chest pain similar to prior ischemic pain. Received 324 ASA and 1 NG from EMS. EKG 60bpm w/ LBBB, T wave abdnormalities in leads II and III new from prior, no ST elevations or depressions. HEAR- score 6; labs, CXR pending.

## 2021-11-24 NOTE — ED ADULT NURSE NOTE - NSICDXPASTMEDICALHX_GEN_ALL_CORE_FT
PAST MEDICAL HISTORY:  DM (diabetes mellitus)     H/O gastroesophageal reflux (GERD)     HTN (hypertension)      PAST MEDICAL HISTORY:  Cardiac pacemaker MICRA for mobitz type 11    DM (diabetes mellitus)     H/O gastroesophageal reflux (GERD)     HTN (hypertension)

## 2021-11-24 NOTE — ED PROVIDER NOTE - PHYSICAL EXAMINATION
General: well appearing, NAD  Head: NC/AT  Cardiac: RRR  Respiratory: CTABL, equal chest wall expansions, no conversational dyspnea  Abdomen: soft, ND, NT  Neuro: AAOx3,coordinated movement of all 4 extremities  Psych: calm, cooperative, normal affect  Skin: warm and dry

## 2021-11-24 NOTE — ED PROVIDER NOTE - NS ED ROS FT
Constitutional: no fever, no sweats, and no chills.  CV: +chest pain, no edema.  Resp: no cough, +dyspnea  GI: no abdominal pain, no nausea and no vomiting.  MSK: no msk pain, no weakness  Skin: no lesions, and no rashes.  Neuro: no LOC, no headache, no sensory deficits, and no dizziness  ROS otherwise negative except as noted in HPI.

## 2021-11-24 NOTE — CONSULT NOTE ADULT - ATTENDING COMMENTS
70 y/o F presents with 70 y/o F presents with chest pain  Also admits to recent cough  CP is described as pleuritic in nature and is reproducible on exam  Micra pacemaker was interrogated  Troponin negative x 1  EKG shows underlying CHB, intermittent V pacing   Repeat troponin; if negative, patient can follow up as outpatient for further workup    Plan discussed with patient and her  at bedside    Thank you for allowing me to participate in the care of this patient  Please contact me with any further questions

## 2021-11-24 NOTE — ED PROVIDER NOTE - CARE PLAN
Principal Discharge DX:	Chest pain   1 Principal Discharge DX:	Chest pain  Secondary Diagnosis:	NSTEMI (non-ST elevation myocardial infarction)

## 2021-11-24 NOTE — CONSULT NOTE ADULT - SUBJECTIVE AND OBJECTIVE BOX
[facility]                                                               Office:  [address] [place] [address]                                                              Telephone: [phone]. Fax:[phone]                                                                        CARDIOLOGY CONSULTATION NOTE                                                                                             Consult requested by: Dr. Almonte   Reason for Consultation: Chest Pain  PMD: Dr. Mayo  Primary Cardiology: Dr. Allen  History obtained by: Patient and medical record   obtained: No    Chief complaint:    Patient is a 71y old  Female who presents with a chief complaint of chest pain      HPI: Pt is 70 y/o female with medical history of HTN, DM, CVA (2019) with Right sided deficits, CHB s/p Micra PPM, who presents to [facility]ED with chest pain. Pt states that when she was standing up not exerting herself at 1pm, she felt midsternal sharp chest pain that radiated to right shoulder and back which lasted hours. Pt states that chest pain worse with cough which she has had for past week. Pt states that she had similar chest pain when she needed a PPM. Pt states that she  In ED, Trop#1-negative, BNP-339, ECG-V-paced HR @ 60 underlying CHB.          REVIEW OF SYMPTOMS:     CONSTITUTIONAL: No fever, weight loss, or fatigue  ENMT:  No difficulty hearing, tinnitus, vertigo; No sinus or throat pain  NECK: No pain or stiffness  CARDIOVASCULAR: See HPI  RESPIRATORY: No Dyspnea on exertion, Shortness of breath, cough, wheezing  : No dysuria, no hematuria   GI: No dark color stool, no melena, no diarrhea, no constipation, no abdominal pain   NEURO: No headache, no dizziness, no slurred speech   MUSCULOSKELETAL: No joint pain or swelling; No muscle, back, or extremity pain  PSYCH: No agitation, no anxiety.    ALL OTHER REVIEW OF SYSTEMS ARE NEGATIVE.      PREVIOUS DIAGNOSTIC TESTING    ECHO FINDINGS:< from: TTE Echo Complete w/ Contrast w/ Doppler (04.17.20 @ 10:00) >  Summary:   1. Bubble study only to evaluate for shunting.   2. Left ventricular ejection fraction, by visual estimation, is >75%.   3. Normal global left ventricular systolic function.   4. Color flow doppler and intravenous injection of agitated saline demonstrates the presence of an intact intra atrial septum.      STRESS FINDINGS: As per outpatient 7/2020-      ALLERGIES: Allergies    No Known Allergies    Intolerances      PAST MEDICAL HISTORY  DM (diabetes mellitus)    HTN (hypertension)    Acute otitis media, unspecified otitis media type    H/O gastroesophageal reflux (GERD)    Cardiac pacemaker        PAST SURGICAL HISTORY  History of benign brain tumor        FAMILY HISTORY:  FH: asthma  Son        SOCIAL HISTORY:  Denies smoking/alcohol/drugs        HOME MEDICATIONS:  acetaminophen 325 mg oral tablet: 2 tab(s) orally every 6 hours, As needed, Temp greater or equal to 38C (100.4F), Mild Pain (1 - 3) (22 May 2020 08:20)  aspirin 81 mg oral delayed release tablet: 1 tab(s) orally once a day (22 May 2020 08:20)  benzonatate 100 mg oral capsule: 1 cap(s) orally every 8 hours, As needed, Cough (22 May 2020 08:20)  famotidine 20 mg oral tablet: 1 tab(s) orally once a day (22 May 2020 08:20)  lactulose 10 g/15 mL oral syrup: 15 milliliter(s) orally once a day, As needed, constipation (22 May 2020 08:20)  lidocaine topical:  topically 1 daily to painful area. On for 12 hours and off for 12 hours (22 May 2020 08:20)  melatonin 3 mg oral tablet: 1 tab(s) orally once a day (at bedtime) (22 May 2020 08:20)  polyethylene glycol 3350 oral powder for reconstitution: 17 gram(s) orally once a day (22 May 2020 08:20)  simethicone 80 mg oral tablet, chewable: 1 tab(s) orally every 8 hours (28 May 2020 16:18)  sodium chloride 0.65% nasal spray:  nasal  (22 May 2020 08:20)  Gabapentin 300mg  Lipitor 10mg  Pepcid 20mg  Norvasc 5mg  Metformin 1000 BID  Metoprolol Tartrate 25mg BID     Vital Signs Last 24 Hrs  T(C): 36.8 (24 Nov 2021 17:19), Max: 37.1 (24 Nov 2021 15:07)  T(F): 98.2 (24 Nov 2021 17:19), Max: 98.7 (24 Nov 2021 15:07)  HR: 59 (24 Nov 2021 17:19) (59 - 60)  BP: 137/60 (24 Nov 2021 17:19) (137/60 - 179/82)  RR: 19 (24 Nov 2021 17:19) (18 - 19)  SpO2: 97% (24 Nov 2021 17:19) (95% - 97%)      PHYSICAL EXAM:  Constitutional: Comfortable . No acute distress.   HEENT: Atraumatic and normocephalic , neck is supple . no JVD. No carotid bruit. PEERL   CNS: A&Ox3. No focal deficits. EOMI.   Lymph Nodes: Cervical : Not palpable.  Respiratory: CTAB  Cardiovascular: S1S2 RRR. No murmur/rubs or gallop.  Gastrointestinal: Soft non-tender and non distended . +Bowel sounds. negative Cruz's sign.  Extremities: No edema.   Psychiatric: Calm . no agitation.  Skin: No skin rash/ulcers visualized to face, hands or feet.    Intake and output:     LABS:                        14.8   11.49 )-----------( 365      ( 24 Nov 2021 14:57 )             43.8     11-24    133<L>  |  89<L>  |  17.0  ----------------------------<  258<H>  3.6   |  23.0  |  1.08    Ca    10.2      24 Nov 2021 14:57  Mg     1.5     11-24    TPro  8.3  /  Alb  4.8  /  TBili  0.6  /  DBili  x   /  AST  19  /  ALT  16  /  AlkPhos  81  11-24    CARDIAC MARKERS ( 24 Nov 2021 14:57 )  x     / <0.01 ng/mL / x     / x     / x        ;p-BNP=Serum Pro-Brain Natriuretic Peptide: 339 pg/mL (11-24 @ 14:57)      INTERPRETATION OF TELEMETRY: intermittent V-pacing HR @ 50s with underlying CHB  ECG: V-paced HR @ 60 underlying CHB

## 2021-11-24 NOTE — ED PROVIDER NOTE - CARE PROVIDER_API CALL
Travis Allen)  Cardiology; Cardiovascular Disease; Internal Medicine  [address] [place] [address]  Phone: [phone]  Fax: [phone]  Follow Up Time:

## 2021-11-24 NOTE — ED ADULT TRIAGE NOTE - CHIEF COMPLAINT QUOTE
Patient with chest pain and back pain.  EMS states pt appears to be in third degree heart block. Dr. Puga at bedside.

## 2021-11-24 NOTE — ED PROVIDER NOTE - PROGRESS NOTE DETAILS
Resident Felipa Almonte: discussed repeat troponin w/ pt, recommended to stay for third trop given the rise in the second trop. Pt amenable to plan. Resident Felipa Almonte: reassessed pt, VSS, continues to have chest pain. Discussed lab results, repeat troponin pending. Cardiology to see.

## 2021-11-24 NOTE — ED PROVIDER NOTE - ATTENDING CONTRIBUTION TO CARE
71yoF; with PMH signif for CAD s/p stents, Mobitz II s/p Micra PPM, DM, CVA w/residual R-sided deficit; now p/w chest pain--sscp, radiating to left lateral back, associated with sob. denies nausea. denies diaphoresis, non-exertional, non-pleuritic. denies cough. denies smoking. denies travel or trauma.  denies abd pain.   denies n/v/d.  General:     NAD  Head:     NC/AT, EOMI, oral mucosa moist  Neck:     trachea midline  Lungs:     CTA b/l, no w/r/r  CVS:     S1S2, RRR, no m/g/r  Abd:     +BS, s/nt/nd, no organomegaly  Ext:    2+ radial and pedal pulses, no c/c/e  Neuro: AAOx3, no sensory/motor deficits  A/P:  71yoF p/w chest pain  -labs, cardiac monitor, interrogation of device, cardiology consult

## 2021-11-24 NOTE — ED PROVIDER NOTE - OBJECTIVE STATEMENT
72yo F w/pmh CAD s/p stents, Mobitz II s/p pacemaker placement, DM, CVA w/ residual R-sided deficits and delayed speech presents for chest pain. Describes as substernal, wrapping around to back, onset midday today while standing talking to someone. Assoc/w SOB. Feels like prior episode when she had stents placed. Denies f/ch, cough, nausea, vomiting. 70yo F w/pmh CAD s/p stents, Mobitz II s/p pacemaker placement, DM, CVA w/ residual R-sided deficits and delayed speech presents for chest pain. Describes as substernal, wrapping around to back, onset midday today while standing talking to someone. Assoc/w SOB. Feels like prior episode when she had stents placed. Denies f/ch, cough, nausea, vomiting. Received 324 ASA and 1 NG from EMS.

## 2021-11-24 NOTE — ED PROVIDER NOTE - NSFOLLOWUPINSTRUCTIONS_ED_ALL_ED_FT
1. Follow up with Dr. Allen in the next 2 weeks.  2. Return to the ED for any new or worsening symptoms, such as worsening chest pain, shortness of breath, or passing out.    You were seen today in the emergency room for chest pain. Although the testing done today indicates that your pain is not from an acute emergency, your pain could still represent a problem with your heart. You need to follow up with your doctor and/or a cardiologist in the next 48-72 hours. If you develop any new or worsening symptoms you need to return immediately to the emergency department. If you experience any of the following please come right back to the emergency room: chest pain that becomes much worse with walking up stairs or exercising, uncontrollable nausea and vomiting, severe chest pain that will not go away, passing out, new persistent numbness and/or weakness. If we discussed that this pain was likely due to a muscle strain or sprain you should take over the counter medications such as Tylenol. You should avoid taking medications such as ibuprofen, Motrin, Advil or other NSAIDs until you speak with your doctor about your pain.

## 2021-11-24 NOTE — CONSULT NOTE ADULT - ASSESSMENT
Pt is 70 y/o female with medical history of HTN, DM, CVA (2019) with Right sided deficits, CHB s/p Micra PPM, who presents to [facility]ED with chest pain.    Chest Pain  -Trop#1-negative  -BNP-339  -ECG-V-paced HR @ 60 underlying CHB  -Patient PPM interrogated-no events   -Patient chest pain pleurtic in nature (worse with cough and palpation)  -Repeat Trop, if no abnormalities found plan for outpatient follow up within 2 weeks with Dr. Allen

## 2021-11-24 NOTE — ED ADULT NURSE NOTE - OBJECTIVE STATEMENT
via , assumed pt care as critical care RN.  Pt c/o substernal chest pain that wraps around to her back.  Pain started today.  Pt has hx of implanted wireless cardiac pacemaker and cardiac stent.  She is alert and oriented X 4.  Cardiac monitor showing regular rhythm 60. No obvious pacemaker spikes on 3 or 12 lead.  MD resident Almonte interrogated pacemaker at bedside.  Currently waiting for faxed results.  Pt in no acute distress.   at bedside. via , assumed pt care as critical care RN.  Pt c/o substernal chest pain that wraps around to her back.  Pain started today.  Pt has hx of implanted wireless cardiac pacemaker for mobitz type 11.  She is alert and oriented X 4.  Cardiac monitor showing regular rhythm 60. No obvious pacemaker spikes on 3 or 12 lead.  MD resident Almonte interrogated pacemaker at bedside.  Currently waiting for faxed results.  Pt in no acute distress.   at bedside.

## 2021-11-25 DIAGNOSIS — R07.9 CHEST PAIN, UNSPECIFIED: ICD-10-CM

## 2021-11-25 LAB
A1C WITH ESTIMATED AVERAGE GLUCOSE RESULT: 11.1 % — HIGH (ref 4–5.6)
ANION GAP SERPL CALC-SCNC: 15 MMOL/L — SIGNIFICANT CHANGE UP (ref 5–17)
APTT BLD: 123.7 SEC — CRITICAL HIGH (ref 27.5–35.5)
APTT BLD: 31.8 SEC — SIGNIFICANT CHANGE UP (ref 27.5–35.5)
APTT BLD: 48.7 SEC — HIGH (ref 27.5–35.5)
BUN SERPL-MCNC: 20.4 MG/DL — HIGH (ref 8–20)
CALCIUM SERPL-MCNC: 9.1 MG/DL — SIGNIFICANT CHANGE UP (ref 8.6–10.2)
CHLORIDE SERPL-SCNC: 94 MMOL/L — LOW (ref 98–107)
CO2 SERPL-SCNC: 26 MMOL/L — SIGNIFICANT CHANGE UP (ref 22–29)
CREAT SERPL-MCNC: 0.91 MG/DL — SIGNIFICANT CHANGE UP (ref 0.5–1.3)
ESTIMATED AVERAGE GLUCOSE: 272 MG/DL — HIGH (ref 68–114)
GLUCOSE BLDC GLUCOMTR-MCNC: 123 MG/DL — HIGH (ref 70–99)
GLUCOSE BLDC GLUCOMTR-MCNC: 155 MG/DL — HIGH (ref 70–99)
GLUCOSE BLDC GLUCOMTR-MCNC: 287 MG/DL — HIGH (ref 70–99)
GLUCOSE BLDC GLUCOMTR-MCNC: 288 MG/DL — HIGH (ref 70–99)
GLUCOSE SERPL-MCNC: 293 MG/DL — HIGH (ref 70–99)
HCT VFR BLD CALC: 38.6 % — SIGNIFICANT CHANGE UP (ref 34.5–45)
HGB BLD-MCNC: 13.1 G/DL — SIGNIFICANT CHANGE UP (ref 11.5–15.5)
INR BLD: 1.15 RATIO — SIGNIFICANT CHANGE UP (ref 0.88–1.16)
MCHC RBC-ENTMCNC: 27.8 PG — SIGNIFICANT CHANGE UP (ref 27–34)
MCHC RBC-ENTMCNC: 33.9 GM/DL — SIGNIFICANT CHANGE UP (ref 32–36)
MCV RBC AUTO: 81.8 FL — SIGNIFICANT CHANGE UP (ref 80–100)
PLATELET # BLD AUTO: 340 K/UL — SIGNIFICANT CHANGE UP (ref 150–400)
POTASSIUM SERPL-MCNC: 4 MMOL/L — SIGNIFICANT CHANGE UP (ref 3.5–5.3)
POTASSIUM SERPL-SCNC: 4 MMOL/L — SIGNIFICANT CHANGE UP (ref 3.5–5.3)
PROTHROM AB SERPL-ACNC: 13.3 SEC — SIGNIFICANT CHANGE UP (ref 10.6–13.6)
RBC # BLD: 4.72 M/UL — SIGNIFICANT CHANGE UP (ref 3.8–5.2)
RBC # FLD: 12.7 % — SIGNIFICANT CHANGE UP (ref 10.3–14.5)
SODIUM SERPL-SCNC: 135 MMOL/L — SIGNIFICANT CHANGE UP (ref 135–145)
TROPONIN T SERPL-MCNC: 0.55 NG/ML — HIGH (ref 0–0.06)
TROPONIN T SERPL-MCNC: 0.69 NG/ML — HIGH (ref 0–0.06)
TROPONIN T SERPL-MCNC: 0.78 NG/ML — HIGH (ref 0–0.06)
WBC # BLD: 11.5 K/UL — HIGH (ref 3.8–10.5)
WBC # FLD AUTO: 11.5 K/UL — HIGH (ref 3.8–10.5)

## 2021-11-25 PROCEDURE — 99232 SBSQ HOSP IP/OBS MODERATE 35: CPT

## 2021-11-25 PROCEDURE — 99223 1ST HOSP IP/OBS HIGH 75: CPT

## 2021-11-25 PROCEDURE — 93306 TTE W/DOPPLER COMPLETE: CPT | Mod: 26

## 2021-11-25 PROCEDURE — 93010 ELECTROCARDIOGRAM REPORT: CPT | Mod: 76

## 2021-11-25 RX ORDER — METOPROLOL TARTRATE 50 MG
25 TABLET ORAL EVERY 6 HOURS
Refills: 0 | Status: DISCONTINUED | OUTPATIENT
Start: 2021-11-25 | End: 2021-11-26

## 2021-11-25 RX ORDER — INSULIN LISPRO 100/ML
4 VIAL (ML) SUBCUTANEOUS
Refills: 0 | Status: DISCONTINUED | OUTPATIENT
Start: 2021-11-25 | End: 2021-11-27

## 2021-11-25 RX ORDER — GLUCAGON INJECTION, SOLUTION 0.5 MG/.1ML
1 INJECTION, SOLUTION SUBCUTANEOUS ONCE
Refills: 0 | Status: DISCONTINUED | OUTPATIENT
Start: 2021-11-25 | End: 2021-11-28

## 2021-11-25 RX ORDER — DEXTROSE 50 % IN WATER 50 %
15 SYRINGE (ML) INTRAVENOUS ONCE
Refills: 0 | Status: DISCONTINUED | OUTPATIENT
Start: 2021-11-25 | End: 2021-11-28

## 2021-11-25 RX ORDER — DEXTROSE 50 % IN WATER 50 %
25 SYRINGE (ML) INTRAVENOUS ONCE
Refills: 0 | Status: DISCONTINUED | OUTPATIENT
Start: 2021-11-25 | End: 2021-11-28

## 2021-11-25 RX ORDER — GABAPENTIN 400 MG/1
300 CAPSULE ORAL THREE TIMES A DAY
Refills: 0 | Status: DISCONTINUED | OUTPATIENT
Start: 2021-11-25 | End: 2021-11-28

## 2021-11-25 RX ORDER — HEPARIN SODIUM 5000 [USP'U]/ML
700 INJECTION INTRAVENOUS; SUBCUTANEOUS
Qty: 25000 | Refills: 0 | Status: DISCONTINUED | OUTPATIENT
Start: 2021-11-25 | End: 2021-11-26

## 2021-11-25 RX ORDER — LIDOCAINE 4 G/100G
1 CREAM TOPICAL
Qty: 0 | Refills: 0 | DISCHARGE

## 2021-11-25 RX ORDER — LANOLIN ALCOHOL/MO/W.PET/CERES
3 CREAM (GRAM) TOPICAL AT BEDTIME
Refills: 0 | Status: DISCONTINUED | OUTPATIENT
Start: 2021-11-25 | End: 2021-11-28

## 2021-11-25 RX ORDER — ASPIRIN/CALCIUM CARB/MAGNESIUM 324 MG
325 TABLET ORAL DAILY
Refills: 0 | Status: DISCONTINUED | OUTPATIENT
Start: 2021-11-25 | End: 2021-11-26

## 2021-11-25 RX ORDER — ASPIRIN/CALCIUM CARB/MAGNESIUM 324 MG
324 TABLET ORAL ONCE
Refills: 0 | Status: COMPLETED | OUTPATIENT
Start: 2021-11-25 | End: 2021-11-25

## 2021-11-25 RX ORDER — ATORVASTATIN CALCIUM 80 MG/1
80 TABLET, FILM COATED ORAL AT BEDTIME
Refills: 0 | Status: DISCONTINUED | OUTPATIENT
Start: 2021-11-25 | End: 2021-11-28

## 2021-11-25 RX ORDER — ONDANSETRON 8 MG/1
4 TABLET, FILM COATED ORAL EVERY 8 HOURS
Refills: 0 | Status: DISCONTINUED | OUTPATIENT
Start: 2021-11-25 | End: 2021-11-28

## 2021-11-25 RX ORDER — ACETAMINOPHEN 500 MG
650 TABLET ORAL EVERY 6 HOURS
Refills: 0 | Status: DISCONTINUED | OUTPATIENT
Start: 2021-11-25 | End: 2021-11-28

## 2021-11-25 RX ORDER — HEPARIN SODIUM 5000 [USP'U]/ML
INJECTION INTRAVENOUS; SUBCUTANEOUS
Qty: 25000 | Refills: 0 | Status: DISCONTINUED | OUTPATIENT
Start: 2021-11-25 | End: 2021-11-25

## 2021-11-25 RX ORDER — HEPARIN SODIUM 5000 [USP'U]/ML
4100 INJECTION INTRAVENOUS; SUBCUTANEOUS ONCE
Refills: 0 | Status: COMPLETED | OUTPATIENT
Start: 2021-11-25 | End: 2021-11-25

## 2021-11-25 RX ORDER — INSULIN LISPRO 100/ML
VIAL (ML) SUBCUTANEOUS
Refills: 0 | Status: DISCONTINUED | OUTPATIENT
Start: 2021-11-25 | End: 2021-11-28

## 2021-11-25 RX ORDER — HEPARIN SODIUM 5000 [USP'U]/ML
600 INJECTION INTRAVENOUS; SUBCUTANEOUS
Qty: 25000 | Refills: 0 | Status: DISCONTINUED | OUTPATIENT
Start: 2021-11-25 | End: 2021-11-25

## 2021-11-25 RX ORDER — SODIUM CHLORIDE 9 MG/ML
1000 INJECTION, SOLUTION INTRAVENOUS
Refills: 0 | Status: DISCONTINUED | OUTPATIENT
Start: 2021-11-25 | End: 2021-11-28

## 2021-11-25 RX ORDER — PANTOPRAZOLE SODIUM 20 MG/1
40 TABLET, DELAYED RELEASE ORAL
Refills: 0 | Status: DISCONTINUED | OUTPATIENT
Start: 2021-11-25 | End: 2021-11-28

## 2021-11-25 RX ORDER — INSULIN GLARGINE 100 [IU]/ML
10 INJECTION, SOLUTION SUBCUTANEOUS AT BEDTIME
Refills: 0 | Status: DISCONTINUED | OUTPATIENT
Start: 2021-11-25 | End: 2021-11-27

## 2021-11-25 RX ORDER — HEPARIN SODIUM 5000 [USP'U]/ML
4100 INJECTION INTRAVENOUS; SUBCUTANEOUS EVERY 6 HOURS
Refills: 0 | Status: DISCONTINUED | OUTPATIENT
Start: 2021-11-25 | End: 2021-11-26

## 2021-11-25 RX ADMIN — Medication 6: at 21:08

## 2021-11-25 RX ADMIN — Medication 324 MILLIGRAM(S): at 00:20

## 2021-11-25 RX ADMIN — Medication 4 UNIT(S): at 09:29

## 2021-11-25 RX ADMIN — HEPARIN SODIUM 0 UNIT(S)/HR: 5000 INJECTION INTRAVENOUS; SUBCUTANEOUS at 09:22

## 2021-11-25 RX ADMIN — HEPARIN SODIUM 800 UNIT(S)/HR: 5000 INJECTION INTRAVENOUS; SUBCUTANEOUS at 01:36

## 2021-11-25 RX ADMIN — HEPARIN SODIUM 700 UNIT(S)/HR: 5000 INJECTION INTRAVENOUS; SUBCUTANEOUS at 17:42

## 2021-11-25 RX ADMIN — Medication 2: at 17:39

## 2021-11-25 RX ADMIN — Medication 4 UNIT(S): at 12:19

## 2021-11-25 RX ADMIN — Medication 650 MILLIGRAM(S): at 08:06

## 2021-11-25 RX ADMIN — HEPARIN SODIUM 4100 UNIT(S): 5000 INJECTION INTRAVENOUS; SUBCUTANEOUS at 01:36

## 2021-11-25 RX ADMIN — Medication 6: at 09:29

## 2021-11-25 RX ADMIN — GABAPENTIN 300 MILLIGRAM(S): 400 CAPSULE ORAL at 21:09

## 2021-11-25 RX ADMIN — GABAPENTIN 300 MILLIGRAM(S): 400 CAPSULE ORAL at 05:09

## 2021-11-25 RX ADMIN — ATORVASTATIN CALCIUM 80 MILLIGRAM(S): 80 TABLET, FILM COATED ORAL at 21:08

## 2021-11-25 RX ADMIN — Medication 650 MILLIGRAM(S): at 09:06

## 2021-11-25 RX ADMIN — GABAPENTIN 300 MILLIGRAM(S): 400 CAPSULE ORAL at 12:19

## 2021-11-25 RX ADMIN — PANTOPRAZOLE SODIUM 40 MILLIGRAM(S): 20 TABLET, DELAYED RELEASE ORAL at 05:09

## 2021-11-25 RX ADMIN — Medication 325 MILLIGRAM(S): at 12:19

## 2021-11-25 RX ADMIN — Medication 4 UNIT(S): at 17:39

## 2021-11-25 RX ADMIN — INSULIN GLARGINE 10 UNIT(S): 100 INJECTION, SOLUTION SUBCUTANEOUS at 21:07

## 2021-11-25 NOTE — H&P ADULT - ASSESSMENT
72 y/o female with NSTEMI, hx of CVA, DM-2, HTN, HLD, Neuropathy, GERD    NSTEMI:  -Currently CP free, EKG unchanged, vitals stable  -Cont. Aspirin, high intensity statin, BB, serial Trop/EKG, 2Decho  -PRN nitro, check FLP/A1c  -Cardiology f/u for likely inpatient LHC   -OOB, ambulate, fall risk  -Cardiac diet      CVA:  -improved right residual deficits from 4/20  -no falls at home, no dysphagia  -cont. o/p regimen    DM-2:  -Hold Metformin   -Lantus, mealtime and coverage insulin  -ADA diet  -Check A1c    HTN:  -Hold norvasc with increased BB  -DASH diet    Neuropathy:  -Cont. Neurontin    GERD:  -PPI    Discussed with Patient,  at bedside with , and Nurse

## 2021-11-25 NOTE — PROGRESS NOTE ADULT - SUBJECTIVE AND OBJECTIVE BOX
[facility]  [address] [place] [address]    SUBJECTIVE / OVERNIGHT EVENTS:  Admitted yesterday with atypical chest discomfort  2 trops negative but 3rd pos 0.14  Has reproducible pain across chest wall    ROS:  Cardiac see hpi  RESP: No mucus production no uri symptoms  GI:  No melana no abd pain  EXTREMITIES:  no calf tenderness no edema    TELEMETRY:      MEDICATIONS  (STANDING):  aspirin enteric coated 325 milliGRAM(s) Oral daily  atorvastatin 80 milliGRAM(s) Oral at bedtime  gabapentin 300 milliGRAM(s) Oral three times a day  glucagon  Injectable 1 milliGRAM(s) IntraMuscular once  heparin  Infusion.  Unit(s)/Hr (8 mL/Hr) IV Continuous <Continuous>  insulin glargine Injectable (LANTUS) 10 Unit(s) SubCutaneous at bedtime  insulin lispro (ADMELOG) corrective regimen sliding scale   SubCutaneous Before meals and at bedtime  insulin lispro Injectable (ADMELOG) 4 Unit(s) SubCutaneous before breakfast  insulin lispro Injectable (ADMELOG) 4 Unit(s) SubCutaneous before lunch  insulin lispro Injectable (ADMELOG) 4 Unit(s) SubCutaneous before dinner  metoprolol tartrate 25 milliGRAM(s) Oral every 6 hours  pantoprazole    Tablet 40 milliGRAM(s) Oral before breakfast    MEDICATIONS  (PRN):  acetaminophen     Tablet .. 650 milliGRAM(s) Oral every 6 hours PRN Temp greater or equal to 38C (100.4F), Mild Pain (1 - 3)  aluminum hydroxide/magnesium hydroxide/simethicone Suspension 30 milliLiter(s) Oral every 4 hours PRN Dyspepsia  heparin   Injectable 4100 Unit(s) IV Push every 6 hours PRN For aPTT less than 40  melatonin 3 milliGRAM(s) Oral at bedtime PRN Insomnia  ondansetron Injectable 4 milliGRAM(s) IV Push every 8 hours PRN Nausea and/or Vomiting    Vital Signs Last 24 Hrs  T(C): 36.6 (25 Nov 2021 10:49), Max: 37.1 (24 Nov 2021 15:07)  T(F): 97.9 (25 Nov 2021 10:49), Max: 98.7 (24 Nov 2021 15:07)  HR: 60 (25 Nov 2021 12:21) (59 - 78)  BP: 122/73 (25 Nov 2021 12:21) (110/70 - 179/82)  BP(mean): 95 (25 Nov 2021 01:57) (95 - 95)  RR: 18 (25 Nov 2021 04:55) (18 - 19)  SpO2: 98% (25 Nov 2021 10:49) (95% - 100%)  I&O's Summary    24 Nov 2021 07:01  -  25 Nov 2021 07:00  --------------------------------------------------------  IN: 48 mL / OUT: 0 mL / NET: 48 mL    PHYSICAL EXAM:  GENERAL: In nad  EYES: EOMI, PERRLA, conjunctiva and sclera clear  NECK:  No JVD  CHEST/LUNG: CTABL ; No wheeze  HEART: RRR; No murmurs  ABDOMEN: Soft, Nontender, Nondistended; Bowel sounds present  EXTREMITIES:  2+ Peripheral Pulses, No edema  PSYCH: AAOx3  NEUROLOGY: non-focal  SKIN: No rashes or lesions. No sacral ulcer    LABS:                        13.1   11.50 )-----------( 340      ( 25 Nov 2021 08:36 )             38.6     11-25    135  |  94<L>  |  20.4<H>  ----------------------------<  293<H>  4.0   |  26.0  |  0.91    Ca    9.1      25 Nov 2021 08:36  Mg     1.5     11-24    TPro  8.3  /  Alb  4.8  /  TBili  0.6  /  DBili  x   /  AST  19  /  ALT  16  /  AlkPhos  81  11-24    PT/INR - ( 25 Nov 2021 00:50 )   PT: 13.3 sec;   INR: 1.15 ratio         PTT - ( 25 Nov 2021 08:36 )  PTT:123.7 sec  CARDIAC MARKERS ( 25 Nov 2021 11:54 )  x     / 0.78 ng/mL / x     / x     / x      CARDIAC MARKERS ( 24 Nov 2021 22:39 )  x     / 0.14 ng/mL / x     / x     / x      CARDIAC MARKERS ( 24 Nov 2021 19:18 )  x     / 0.05 ng/mL / x     / x     / x      CARDIAC MARKERS ( 24 Nov 2021 14:57 )  x     / <0.01 ng/mL / x     / x     / x          < from: TTE Echo Complete w/ Contrast w/ Doppler (04.17.20 @ 10:00) >  Summary:   1. Bubble study only to evaluate for shunting.   2. Left ventricular ejection fraction, by visual estimation, is >75%.   3. Normal global left ventricular systolic function.   4. Color flow doppler and intravenous injection of agitated saline demonstrates the presence of an intact intra atrial septum.                         [facility]  [address] [place] [address]    SUBJECTIVE / OVERNIGHT EVENTS:  Admitted yesterday with atypical chest discomfort  2 trops negative but 3rd pos 0.14  Has reproducible pain across chest wall  Ekg sinus with LBBB unchanged    ROS:  Cardiac see hpi  RESP: No mucus production no uri symptoms  GI:  No melana no abd pain  EXTREMITIES:  no calf tenderness no edema    TELEMETRY:  Intermittent pacing    MEDICATIONS  (STANDING):  aspirin enteric coated 325 milliGRAM(s) Oral daily  atorvastatin 80 milliGRAM(s) Oral at bedtime  gabapentin 300 milliGRAM(s) Oral three times a day  glucagon  Injectable 1 milliGRAM(s) IntraMuscular once  heparin  Infusion.  Unit(s)/Hr (8 mL/Hr) IV Continuous <Continuous>  insulin glargine Injectable (LANTUS) 10 Unit(s) SubCutaneous at bedtime  insulin lispro (ADMELOG) corrective regimen sliding scale   SubCutaneous Before meals and at bedtime  insulin lispro Injectable (ADMELOG) 4 Unit(s) SubCutaneous before breakfast  insulin lispro Injectable (ADMELOG) 4 Unit(s) SubCutaneous before lunch  insulin lispro Injectable (ADMELOG) 4 Unit(s) SubCutaneous before dinner  metoprolol tartrate 25 milliGRAM(s) Oral every 6 hours  pantoprazole    Tablet 40 milliGRAM(s) Oral before breakfast    MEDICATIONS  (PRN):  acetaminophen     Tablet .. 650 milliGRAM(s) Oral every 6 hours PRN Temp greater or equal to 38C (100.4F), Mild Pain (1 - 3)  aluminum hydroxide/magnesium hydroxide/simethicone Suspension 30 milliLiter(s) Oral every 4 hours PRN Dyspepsia  heparin   Injectable 4100 Unit(s) IV Push every 6 hours PRN For aPTT less than 40  melatonin 3 milliGRAM(s) Oral at bedtime PRN Insomnia  ondansetron Injectable 4 milliGRAM(s) IV Push every 8 hours PRN Nausea and/or Vomiting    Vital Signs Last 24 Hrs  T(C): 36.6 (25 Nov 2021 10:49), Max: 37.1 (24 Nov 2021 15:07)  T(F): 97.9 (25 Nov 2021 10:49), Max: 98.7 (24 Nov 2021 15:07)  HR: 60 (25 Nov 2021 12:21) (59 - 78)  BP: 122/73 (25 Nov 2021 12:21) (110/70 - 179/82)  BP(mean): 95 (25 Nov 2021 01:57) (95 - 95)  RR: 18 (25 Nov 2021 04:55) (18 - 19)  SpO2: 98% (25 Nov 2021 10:49) (95% - 100%)  I&O's Summary    24 Nov 2021 07:01  -  25 Nov 2021 07:00  --------------------------------------------------------  IN: 48 mL / OUT: 0 mL / NET: 48 mL    PHYSICAL EXAM:  GENERAL: In nad  EYES: EOMI, PERRLA, conjunctiva and sclera clear  NECK:  No JVD  CHEST/LUNG: CTABL ; No wheeze  HEART: RRR; No murmurs  ABDOMEN: Soft, Nontender, Nondistended; Bowel sounds present  EXTREMITIES:  2+ Peripheral Pulses, No edema  PSYCH: AAOx3  NEUROLOGY: non-focal  SKIN: No rashes or lesions. No sacral ulcer    LABS:                        13.1   11.50 )-----------( 340      ( 25 Nov 2021 08:36 )             38.6     11-25    135  |  94<L>  |  20.4<H>  ----------------------------<  293<H>  4.0   |  26.0  |  0.91    Ca    9.1      25 Nov 2021 08:36  Mg     1.5     11-24    TPro  8.3  /  Alb  4.8  /  TBili  0.6  /  DBili  x   /  AST  19  /  ALT  16  /  AlkPhos  81  11-24    PT/INR - ( 25 Nov 2021 00:50 )   PT: 13.3 sec;   INR: 1.15 ratio         PTT - ( 25 Nov 2021 08:36 )  PTT:123.7 sec  CARDIAC MARKERS ( 25 Nov 2021 11:54 )  x     / 0.78 ng/mL / x     / x     / x      CARDIAC MARKERS ( 24 Nov 2021 22:39 )  x     / 0.14 ng/mL / x     / x     / x      CARDIAC MARKERS ( 24 Nov 2021 19:18 )  x     / 0.05 ng/mL / x     / x     / x      CARDIAC MARKERS ( 24 Nov 2021 14:57 )  x     / <0.01 ng/mL / x     / x     / x          < from: TTE Echo Complete w/ Contrast w/ Doppler (04.17.20 @ 10:00) >  Summary:   1. Bubble study only to evaluate for shunting.   2. Left ventricular ejection fraction, by visual estimation, is >75%.   3. Normal global left ventricular systolic function.   4. Color flow doppler and intravenous injection of agitated saline demonstrates the presence of an intact intra atrial septum.

## 2021-11-25 NOTE — PROGRESS NOTE ADULT - ASSESSMENT
70 y/o female with medical history of HLD,  HTN, DM, CVA (2019) with Right sided deficits, CHB s/p Micra PPM, who presents to [facility]-ED with chest pain. Trop positive  echo is pending    Chest pain with moderate risk factors for CAD (DM, HLD  & HTN) + trop  Further plan pending results of echo   Likely left heart cath in the am pending echo rsults  c/w metoprolol , lipitor and asa  continue heparin for now     CHB  micro pacer in place    HTN c/w metoprolol  Low na diet

## 2021-11-25 NOTE — H&P ADULT - RESPIRATORY RATE (BREATHS/MIN)
39F pmhx of "GI problems" since childhood, presenting with cC of periumbilical pain x3-4 weeks. Associated symptoms of nausea, vomiting, belching. Tried Maalox - didn't help. Was seen by PCP and referred to a GI doctor - has not been seen at clinic yet. Eating makes it worse. No f/c/urinary complaints. Currently menstruating, LMP was about 1 month ago. Normal BMs, last one was today. 3 C sections, no other abd surgeries.
18

## 2021-11-25 NOTE — H&P ADULT - HISTORY OF PRESENT ILLNESS
72 y/o female with hx of CVA in 4/20 with residual right sided weakness, DM-2, CHB s/p leadless PPM, HTN, HLD, Neuropathy, GERD who was BIBEMS last night for c/o intermittent CP since the morning. Denies any travel, sick contacts, or changes in medications. Pain was associated with movement at first but then she states came and went without movement. No associated diaphoresis or nausea. Denies hx of MI. Echo in 4/20 with normal EF no valvulopathy. Patient initial w/u in ED was nondiagnostic with EKG at baseline compared to 8/21, neg trop, stable vitals. Seen by Cardiology and was to be Dcd if repeat trop neg, however on repeat testing it came back positive, repeat EKG unchanged. Currently CP free.

## 2021-11-25 NOTE — H&P ADULT - PSYCHIATRIC
details… Affect and characteristics of appearance, verbalizations, behaviors are appropriate Protocol For Photochemotherapy For Severe Photoresponsive Dermatoses: Tar And Broad Band Uvb (Goeckerman Treatment): The patient received Photochemotherapy for severe photoresponsive dermatoses: Tar and Broad Band UVB (Goeckerman treatment) requiring at least 4 to 8 hours of care under direct physician supervision.

## 2021-11-25 NOTE — H&P ADULT - NSICDXPASTMEDICALHX_GEN_ALL_CORE_FT
PAST MEDICAL HISTORY:  Cardiac pacemaker [employer] for mobitz type 11    CVA (cerebrovascular accident) resdiual right sided weakness    DM (diabetes mellitus)     H/O gastroesophageal reflux (GERD)     HTN (hypertension)

## 2021-11-25 NOTE — PROGRESS NOTE ADULT - SUBJECTIVE AND OBJECTIVE BOX
Hospitalist Daily Progress Note    Chief Complaint:  Patient is a 71y old  Female who presents with a chief complaint of NSTEMI (25 Nov 2021 01:57)      SUBJECTIVE / OVERNIGHT EVENTS:  Patient was seen and examined at bedside with daughter. Occitan translated by [id number]. States that chest pain is improved but feels mild tightness.   Patient denies   SOB, abd pain, N/V, fever, chills, dysuria or any other complaints. All remainder ROS negative.     MEDICATIONS  (STANDING):  aspirin enteric coated 325 milliGRAM(s) Oral daily  atorvastatin 80 milliGRAM(s) Oral at bedtime  dextrose 40% Gel 15 Gram(s) Oral once  dextrose 5%. 1000 milliLiter(s) (50 mL/Hr) IV Continuous <Continuous>  dextrose 50% Injectable 25 Gram(s) IV Push once  gabapentin 300 milliGRAM(s) Oral three times a day  glucagon  Injectable 1 milliGRAM(s) IntraMuscular once  heparin  Infusion.  Unit(s)/Hr (8 mL/Hr) IV Continuous <Continuous>  insulin glargine Injectable (LANTUS) 10 Unit(s) SubCutaneous at bedtime  insulin lispro (ADMELOG) corrective regimen sliding scale   SubCutaneous Before meals and at bedtime  insulin lispro Injectable (ADMELOG) 4 Unit(s) SubCutaneous before breakfast  insulin lispro Injectable (ADMELOG) 4 Unit(s) SubCutaneous before lunch  insulin lispro Injectable (ADMELOG) 4 Unit(s) SubCutaneous before dinner  metoprolol tartrate 25 milliGRAM(s) Oral every 6 hours  pantoprazole    Tablet 40 milliGRAM(s) Oral before breakfast    MEDICATIONS  (PRN):  acetaminophen     Tablet .. 650 milliGRAM(s) Oral every 6 hours PRN Temp greater or equal to 38C (100.4F), Mild Pain (1 - 3)  aluminum hydroxide/magnesium hydroxide/simethicone Suspension 30 milliLiter(s) Oral every 4 hours PRN Dyspepsia  heparin   Injectable 4100 Unit(s) IV Push every 6 hours PRN For aPTT less than 40  melatonin 3 milliGRAM(s) Oral at bedtime PRN Insomnia  ondansetron Injectable 4 milliGRAM(s) IV Push every 8 hours PRN Nausea and/or Vomiting        I&O's Summary    24 Nov 2021 07:01  -  25 Nov 2021 07:00  --------------------------------------------------------  IN: 48 mL / OUT: 0 mL / NET: 48 mL        PHYSICAL EXAM:  Vital Signs Last 24 Hrs  T(C): 36.6 (25 Nov 2021 10:49), Max: 37.1 (24 Nov 2021 15:07)  T(F): 97.9 (25 Nov 2021 10:49), Max: 98.7 (24 Nov 2021 15:07)  HR: 60 (25 Nov 2021 10:49) (59 - 78)  BP: 110/70 (25 Nov 2021 10:49) (110/70 - 179/82)  BP(mean): 95 (25 Nov 2021 01:57) (95 - 95)  RR: 18 (25 Nov 2021 04:55) (18 - 19)  SpO2: 98% (25 Nov 2021 10:49) (95% - 100%)      Constitutional: NAD, Resting  ENT: Supple, No JVD  Lungs: CTA B/L, Non-labored breathing  Cardio: RRR, S1/S2, No murmur  Abdomen: Soft, Nontender, Nondistended; Bowel sounds present  Extremities: No calf tenderness, No pitting edema  Musculoskeletal:   No clubbing or cyanosis of digits; no joint swelling or tenderness to palpation  Psych: Calm, cooperative affect appropriate  Neuro: Awake and alert, oriented x4  Skin: No rashes; no palpable lesions    LABS:                        13.1   11.50 )-----------( 340      ( 25 Nov 2021 08:36 )             38.6     11-25    135  |  94<L>  |  20.4<H>  ----------------------------<  293<H>  4.0   |  26.0  |  0.91    Ca    9.1      25 Nov 2021 08:36  Mg     1.5     11-24    TPro  8.3  /  Alb  4.8  /  TBili  0.6  /  DBili  x   /  AST  19  /  ALT  16  /  AlkPhos  81  11-24    PT/INR - ( 25 Nov 2021 00:50 )   PT: 13.3 sec;   INR: 1.15 ratio         PTT - ( 25 Nov 2021 08:36 )  PTT:123.7 sec  CARDIAC MARKERS ( 24 Nov 2021 22:39 )  x     / 0.14 ng/mL / x     / x     / x      CARDIAC MARKERS ( 24 Nov 2021 19:18 )  x     / 0.05 ng/mL / x     / x     / x      CARDIAC MARKERS ( 24 Nov 2021 14:57 )  x     / <0.01 ng/mL / x     / x     / x              CAPILLARY BLOOD GLUCOSE      POCT Blood Glucose.: 287 mg/dL (25 Nov 2021 09:28)        RADIOLOGY REVIEWED

## 2021-11-25 NOTE — PROGRESS NOTE ADULT - ASSESSMENT
70 y/o female with NSTEMI, hx of CVA, DM-2, HTN, HLD, Neuropathy, GERD    NSTEMI:  -Monitor trops, ekg  -Hep GTT  -Cont. Aspirin, high intensity statin, BB  -TTE  -PRN nitro  -Cardiology f/u for likely inpatient LHC   -OOB, ambulate, fall risk  -Cardiac diet    CVA:  -improved right residual deficits from 4/20  -no falls at home, no dysphagia  -cont. o/p regimen    DM-2:  -Hold Metformin   -Lantus, mealtime and coverage insulin  -ADA diet  -Check A1c    HTN:  -Hold norvasc with increased BB  -DASH diet    Neuropathy:  -Cont. Neurontin    GERD:  -PPI    Discussed with Patient, daughter at bedside with     Dispo: Pending course and possible inpatient cardiac work up.

## 2021-11-26 ENCOUNTER — TRANSCRIPTION ENCOUNTER (OUTPATIENT)
Age: 71
End: 2021-11-26

## 2021-11-26 LAB
A1C WITH ESTIMATED AVERAGE GLUCOSE RESULT: 11.4 % — HIGH (ref 4–5.6)
ALBUMIN SERPL ELPH-MCNC: 4.1 G/DL — SIGNIFICANT CHANGE UP (ref 3.3–5.2)
ALP SERPL-CCNC: 93 U/L — SIGNIFICANT CHANGE UP (ref 40–120)
ALT FLD-CCNC: 14 U/L — SIGNIFICANT CHANGE UP
ANION GAP SERPL CALC-SCNC: 15 MMOL/L — SIGNIFICANT CHANGE UP (ref 5–17)
APTT BLD: 116.4 SEC — HIGH (ref 27.5–35.5)
APTT BLD: 148.3 SEC — CRITICAL HIGH (ref 27.5–35.5)
AST SERPL-CCNC: 26 U/L — SIGNIFICANT CHANGE UP
BASOPHILS # BLD AUTO: 0.07 K/UL — SIGNIFICANT CHANGE UP (ref 0–0.2)
BASOPHILS NFR BLD AUTO: 0.6 % — SIGNIFICANT CHANGE UP (ref 0–2)
BILIRUB SERPL-MCNC: 0.4 MG/DL — SIGNIFICANT CHANGE UP (ref 0.4–2)
BUN SERPL-MCNC: 28.7 MG/DL — HIGH (ref 8–20)
CALCIUM SERPL-MCNC: 9.8 MG/DL — SIGNIFICANT CHANGE UP (ref 8.6–10.2)
CHLORIDE SERPL-SCNC: 96 MMOL/L — LOW (ref 98–107)
CHOLEST SERPL-MCNC: 160 MG/DL — SIGNIFICANT CHANGE UP
CO2 SERPL-SCNC: 22 MMOL/L — SIGNIFICANT CHANGE UP (ref 22–29)
COVID-19 NUCLEOCAPSID GAM AB INTERP: POSITIVE
COVID-19 NUCLEOCAPSID TOTAL GAM ANTIBODY RESULT: 22.7 INDEX — HIGH
COVID-19 SPIKE DOMAIN AB INTERP: POSITIVE
COVID-19 SPIKE DOMAIN ANTIBODY RESULT: >250 U/ML — HIGH
CREAT SERPL-MCNC: 1.11 MG/DL — SIGNIFICANT CHANGE UP (ref 0.5–1.3)
EOSINOPHIL # BLD AUTO: 0.35 K/UL — SIGNIFICANT CHANGE UP (ref 0–0.5)
EOSINOPHIL NFR BLD AUTO: 3 % — SIGNIFICANT CHANGE UP (ref 0–6)
ESTIMATED AVERAGE GLUCOSE: 280 MG/DL — HIGH (ref 68–114)
GLUCOSE BLDC GLUCOMTR-MCNC: 208 MG/DL — HIGH (ref 70–99)
GLUCOSE BLDC GLUCOMTR-MCNC: 211 MG/DL — HIGH (ref 70–99)
GLUCOSE BLDC GLUCOMTR-MCNC: 217 MG/DL — HIGH (ref 70–99)
GLUCOSE BLDC GLUCOMTR-MCNC: 314 MG/DL — HIGH (ref 70–99)
GLUCOSE BLDC GLUCOMTR-MCNC: 357 MG/DL — HIGH (ref 70–99)
GLUCOSE BLDC GLUCOMTR-MCNC: 364 MG/DL — HIGH (ref 70–99)
GLUCOSE SERPL-MCNC: 338 MG/DL — HIGH (ref 70–99)
HCT VFR BLD CALC: 38.3 % — SIGNIFICANT CHANGE UP (ref 34.5–45)
HCT VFR BLD CALC: 41.5 % — SIGNIFICANT CHANGE UP (ref 34.5–45)
HDLC SERPL-MCNC: 41 MG/DL — LOW
HGB BLD-MCNC: 12.8 G/DL — SIGNIFICANT CHANGE UP (ref 11.5–15.5)
HGB BLD-MCNC: 13.7 G/DL — SIGNIFICANT CHANGE UP (ref 11.5–15.5)
IMM GRANULOCYTES NFR BLD AUTO: 0.7 % — SIGNIFICANT CHANGE UP (ref 0–1.5)
LIPID PNL WITH DIRECT LDL SERPL: 74 MG/DL — SIGNIFICANT CHANGE UP
LYMPHOCYTES # BLD AUTO: 2.75 K/UL — SIGNIFICANT CHANGE UP (ref 1–3.3)
LYMPHOCYTES # BLD AUTO: 23.8 % — SIGNIFICANT CHANGE UP (ref 13–44)
MAGNESIUM SERPL-MCNC: 2 MG/DL — SIGNIFICANT CHANGE UP (ref 1.6–2.6)
MCHC RBC-ENTMCNC: 27.8 PG — SIGNIFICANT CHANGE UP (ref 27–34)
MCHC RBC-ENTMCNC: 28.2 PG — SIGNIFICANT CHANGE UP (ref 27–34)
MCHC RBC-ENTMCNC: 33 GM/DL — SIGNIFICANT CHANGE UP (ref 32–36)
MCHC RBC-ENTMCNC: 33.4 GM/DL — SIGNIFICANT CHANGE UP (ref 32–36)
MCV RBC AUTO: 84.3 FL — SIGNIFICANT CHANGE UP (ref 80–100)
MCV RBC AUTO: 84.4 FL — SIGNIFICANT CHANGE UP (ref 80–100)
MONOCYTES # BLD AUTO: 1.02 K/UL — HIGH (ref 0–0.9)
MONOCYTES NFR BLD AUTO: 8.8 % — SIGNIFICANT CHANGE UP (ref 2–14)
NEUTROPHILS # BLD AUTO: 7.29 K/UL — SIGNIFICANT CHANGE UP (ref 1.8–7.4)
NEUTROPHILS NFR BLD AUTO: 63.1 % — SIGNIFICANT CHANGE UP (ref 43–77)
NON HDL CHOLESTEROL: 119 MG/DL — SIGNIFICANT CHANGE UP
PLATELET # BLD AUTO: 258 K/UL — SIGNIFICANT CHANGE UP (ref 150–400)
PLATELET # BLD AUTO: 269 K/UL — SIGNIFICANT CHANGE UP (ref 150–400)
POTASSIUM SERPL-MCNC: 4.1 MMOL/L — SIGNIFICANT CHANGE UP (ref 3.5–5.3)
POTASSIUM SERPL-SCNC: 4.1 MMOL/L — SIGNIFICANT CHANGE UP (ref 3.5–5.3)
PROT SERPL-MCNC: 7.3 G/DL — SIGNIFICANT CHANGE UP (ref 6.6–8.7)
RBC # BLD: 4.54 M/UL — SIGNIFICANT CHANGE UP (ref 3.8–5.2)
RBC # BLD: 4.92 M/UL — SIGNIFICANT CHANGE UP (ref 3.8–5.2)
RBC # FLD: 13 % — SIGNIFICANT CHANGE UP (ref 10.3–14.5)
RBC # FLD: 13.1 % — SIGNIFICANT CHANGE UP (ref 10.3–14.5)
SARS-COV-2 IGG+IGM SERPL QL IA: 22.7 INDEX — HIGH
SARS-COV-2 IGG+IGM SERPL QL IA: >250 U/ML — HIGH
SARS-COV-2 IGG+IGM SERPL QL IA: POSITIVE
SARS-COV-2 IGG+IGM SERPL QL IA: POSITIVE
SODIUM SERPL-SCNC: 133 MMOL/L — LOW (ref 135–145)
TRIGL SERPL-MCNC: 224 MG/DL — HIGH
TROPONIN T SERPL-MCNC: 0.48 NG/ML — HIGH (ref 0–0.06)
TSH SERPL-MCNC: 1.14 UIU/ML — SIGNIFICANT CHANGE UP (ref 0.27–4.2)
WBC # BLD: 10.34 K/UL — SIGNIFICANT CHANGE UP (ref 3.8–10.5)
WBC # BLD: 11.56 K/UL — HIGH (ref 3.8–10.5)
WBC # FLD AUTO: 10.34 K/UL — SIGNIFICANT CHANGE UP (ref 3.8–10.5)
WBC # FLD AUTO: 11.56 K/UL — HIGH (ref 3.8–10.5)

## 2021-11-26 PROCEDURE — 93010 ELECTROCARDIOGRAM REPORT: CPT

## 2021-11-26 PROCEDURE — 99232 SBSQ HOSP IP/OBS MODERATE 35: CPT

## 2021-11-26 PROCEDURE — 93458 L HRT ARTERY/VENTRICLE ANGIO: CPT | Mod: 26

## 2021-11-26 PROCEDURE — 99152 MOD SED SAME PHYS/QHP 5/>YRS: CPT

## 2021-11-26 PROCEDURE — 99233 SBSQ HOSP IP/OBS HIGH 50: CPT

## 2021-11-26 RX ORDER — ENOXAPARIN SODIUM 100 MG/ML
65 INJECTION SUBCUTANEOUS
Refills: 0 | Status: DISCONTINUED | OUTPATIENT
Start: 2021-11-26 | End: 2021-11-28

## 2021-11-26 RX ORDER — ASPIRIN/CALCIUM CARB/MAGNESIUM 324 MG
81 TABLET ORAL DAILY
Refills: 0 | Status: DISCONTINUED | OUTPATIENT
Start: 2021-11-26 | End: 2021-11-28

## 2021-11-26 RX ORDER — CLOPIDOGREL BISULFATE 75 MG/1
1 TABLET, FILM COATED ORAL
Qty: 90 | Refills: 1
Start: 2021-11-26 | End: 2022-05-24

## 2021-11-26 RX ORDER — SODIUM CHLORIDE 9 MG/ML
250 INJECTION INTRAMUSCULAR; INTRAVENOUS; SUBCUTANEOUS ONCE
Refills: 0 | Status: COMPLETED | OUTPATIENT
Start: 2021-11-26 | End: 2021-11-26

## 2021-11-26 RX ORDER — METFORMIN HYDROCHLORIDE 850 MG/1
1 TABLET ORAL
Qty: 60 | Refills: 0
Start: 2021-11-26 | End: 2021-12-25

## 2021-11-26 RX ORDER — METOPROLOL TARTRATE 50 MG
1 TABLET ORAL
Qty: 90 | Refills: 0
Start: 2021-11-26 | End: 2022-02-23

## 2021-11-26 RX ORDER — RANOLAZINE 500 MG/1
1 TABLET, FILM COATED, EXTENDED RELEASE ORAL
Qty: 180 | Refills: 0
Start: 2021-11-26 | End: 2022-02-23

## 2021-11-26 RX ORDER — METOPROLOL TARTRATE 50 MG
100 TABLET ORAL DAILY
Refills: 0 | Status: DISCONTINUED | OUTPATIENT
Start: 2021-11-26 | End: 2021-11-28

## 2021-11-26 RX ORDER — CLOPIDOGREL BISULFATE 75 MG/1
75 TABLET, FILM COATED ORAL DAILY
Refills: 0 | Status: DISCONTINUED | OUTPATIENT
Start: 2021-11-27 | End: 2021-11-28

## 2021-11-26 RX ORDER — OMEPRAZOLE 10 MG/1
1 CAPSULE, DELAYED RELEASE ORAL
Qty: 0 | Refills: 0 | DISCHARGE

## 2021-11-26 RX ORDER — CLOPIDOGREL BISULFATE 75 MG/1
300 TABLET, FILM COATED ORAL ONCE
Refills: 0 | Status: COMPLETED | OUTPATIENT
Start: 2021-11-26 | End: 2021-11-26

## 2021-11-26 RX ORDER — METOPROLOL TARTRATE 50 MG
25 TABLET ORAL EVERY 6 HOURS
Refills: 0 | Status: DISCONTINUED | OUTPATIENT
Start: 2021-11-26 | End: 2021-11-26

## 2021-11-26 RX ORDER — ISOSORBIDE MONONITRATE 60 MG/1
30 TABLET, EXTENDED RELEASE ORAL DAILY
Refills: 0 | Status: DISCONTINUED | OUTPATIENT
Start: 2021-11-26 | End: 2021-11-28

## 2021-11-26 RX ORDER — RANOLAZINE 500 MG/1
500 TABLET, FILM COATED, EXTENDED RELEASE ORAL
Refills: 0 | Status: DISCONTINUED | OUTPATIENT
Start: 2021-11-27 | End: 2021-11-28

## 2021-11-26 RX ORDER — ATORVASTATIN CALCIUM 80 MG/1
1 TABLET, FILM COATED ORAL
Qty: 90 | Refills: 0
Start: 2021-11-26 | End: 2022-02-23

## 2021-11-26 RX ORDER — ISOSORBIDE MONONITRATE 60 MG/1
1 TABLET, EXTENDED RELEASE ORAL
Qty: 90 | Refills: 0
Start: 2021-11-26 | End: 2022-02-23

## 2021-11-26 RX ADMIN — Medication 81 MILLIGRAM(S): at 09:31

## 2021-11-26 RX ADMIN — GABAPENTIN 300 MILLIGRAM(S): 400 CAPSULE ORAL at 21:41

## 2021-11-26 RX ADMIN — ENOXAPARIN SODIUM 65 MILLIGRAM(S): 100 INJECTION SUBCUTANEOUS at 19:13

## 2021-11-26 RX ADMIN — ISOSORBIDE MONONITRATE 30 MILLIGRAM(S): 60 TABLET, EXTENDED RELEASE ORAL at 19:11

## 2021-11-26 RX ADMIN — Medication 4 UNIT(S): at 14:27

## 2021-11-26 RX ADMIN — CLOPIDOGREL BISULFATE 300 MILLIGRAM(S): 75 TABLET, FILM COATED ORAL at 14:24

## 2021-11-26 RX ADMIN — ONDANSETRON 4 MILLIGRAM(S): 8 TABLET, FILM COATED ORAL at 13:17

## 2021-11-26 RX ADMIN — Medication 10: at 19:10

## 2021-11-26 RX ADMIN — GABAPENTIN 300 MILLIGRAM(S): 400 CAPSULE ORAL at 14:23

## 2021-11-26 RX ADMIN — Medication 8: at 08:35

## 2021-11-26 RX ADMIN — INSULIN GLARGINE 10 UNIT(S): 100 INJECTION, SOLUTION SUBCUTANEOUS at 21:41

## 2021-11-26 RX ADMIN — Medication 4: at 21:40

## 2021-11-26 RX ADMIN — Medication 4: at 14:26

## 2021-11-26 RX ADMIN — Medication 25 MILLIGRAM(S): at 05:23

## 2021-11-26 RX ADMIN — Medication 650 MILLIGRAM(S): at 19:11

## 2021-11-26 RX ADMIN — HEPARIN SODIUM 0 UNIT(S)/HR: 5000 INJECTION INTRAVENOUS; SUBCUTANEOUS at 01:22

## 2021-11-26 RX ADMIN — SODIUM CHLORIDE 250 MILLILITER(S): 9 INJECTION INTRAMUSCULAR; INTRAVENOUS; SUBCUTANEOUS at 09:31

## 2021-11-26 RX ADMIN — Medication 4 UNIT(S): at 19:10

## 2021-11-26 RX ADMIN — ATORVASTATIN CALCIUM 80 MILLIGRAM(S): 80 TABLET, FILM COATED ORAL at 21:41

## 2021-11-26 RX ADMIN — HEPARIN SODIUM 500 UNIT(S)/HR: 5000 INJECTION INTRAVENOUS; SUBCUTANEOUS at 02:31

## 2021-11-26 NOTE — DISCHARGE NOTE PROVIDER - NSDCCPCAREPLAN_GEN_ALL_CORE_FT
PRINCIPAL DISCHARGE DIAGNOSIS  Diagnosis: Chest pain  Assessment and Plan of Treatment: Patient with past medical history of HTN, DM, CVA (2019) with Right sided deficits, CHB s/p Micra PPM, who presents to [facility]-ED with chest pain. Pt states that when she was standing up not exerting herself at 1pm, she felt midsternal sharp chest pain that radiated to right shoulder and back which lasted hours. Pt states that chest pain worse with cough which she has had for past week. Pt states that she had similar chest pain when she needed a PPM. Pt states that she  In ED, Trop#1-negative, BNP-339, ECG-V-paced HR @ 60 underlying CHB.    HPI noted. Patient seen and examined in cath holding, in NAD. Has right sided deficits from CA (2019). V Paced on Tele. No complain of chest pain, sob or palpitations. On heparin gtt for NSTEMI, to be held in cath hold. Apirin 81 mg prior to cath.            SECONDARY DISCHARGE DIAGNOSES  Diagnosis: NSTEMI (non-ST elevation myocardial infarction)  Assessment and Plan of Treatment:      PRINCIPAL DISCHARGE DIAGNOSIS  Diagnosis: Chest pain  Assessment and Plan of Treatment: Patient with past medical history of HTN, DM, CVA (2019) with Right sided deficits, CHB s/p Micra PPM, who presents to [facility]-ED with chest pain. Pt states that when she was standing up not exerting herself at 1pm, she felt midsternal sharp chest pain that radiated to right shoulder and back which lasted hours. Pt states that chest pain worse with cough which she has had for past week. Pt states that she had similar chest pain when she needed a PPM.  Has right sided deficits from CA (2019). V Paced on Tele. Was started on heparin gtt for NSTEMI and scheduled for left heart catheterization to evaluate coronary anatomy.      SECONDARY DISCHARGE DIAGNOSES  Diagnosis: NSTEMI (non-ST elevation myocardial infarction)  Assessment and Plan of Treatment: In ED, Trop#1-negative, BNP-339, ECG-V-paced HR @ 60 underlying CHB.  Troponin were elevated in subsequent blood draws 0.69 --> 0.55 --> 0.48, was admitted for NSTEMI, started on heparin gtt and scheduled for left heart catheterization to evaluate coronary anatomy.     PRINCIPAL DISCHARGE DIAGNOSIS  Diagnosis: Chest pain  Assessment and Plan of Treatment: Was started on heparin gtt for NSTEMI and scheduled for left heart catheterization to evaluate coronary anatomy.   Patient now status post left heart catheterization which showed diffuse multivessel disease RCA, LAD, OM. Hemostasis achieved with right radial band. Heparin 2000 units given in cath lab. Recommendations: Start Plavix; 300 mg x1 now and then continue with Plavix 75 mg daily on 11/27/21, start Imdur 30 mg daily, will start Ranexa 500 mg BID starting 11/27/21. Continue with Atorvastatin 80 mg daily and Aspirin 81 mg daily. Will maximize patient with medical management for now. If symptoms persists, High Risk PCI likely to be considered.      SECONDARY DISCHARGE DIAGNOSES  Diagnosis: NSTEMI (non-ST elevation myocardial infarction)  Assessment and Plan of Treatment: In ED, Trop#1-negative, BNP-339, ECG-V-paced HR @ 60 underlying CHB.  Troponin were elevated in subsequent blood draws 0.69 --> 0.55 --> 0.48, was admitted for NSTEMI, started on heparin gtt and scheduled for left heart catheterization to evaluate coronary anatomy.    Diagnosis: History of complete heart block  Assessment and Plan of Treatment: s/p micraPPM  follow up with EP in 2-3 weeks

## 2021-11-26 NOTE — DISCHARGE NOTE PROVIDER - NSDCFUADDAPPT_GEN_ALL_CORE_FT
Please follow with Dr. Guthrie in 2-3 weeks. Bring all of you medication bottles when you come to the office to review with Dr. Allen.

## 2021-11-26 NOTE — DISCHARGE NOTE PROVIDER - HOSPITAL COURSE
70 y/o female with medical history of HTN, DM, CVA (2019) with Right sided deficits, CHB s/p Micra PPM, who presents to [facility]-ED with chest pain. Pt states that when she was standing up not exerting herself at 1pm, she felt midsternal sharp chest pain that radiated to right shoulder and back which lasted hours. Pt states that chest pain worse with cough which she has had for past week. Pt states that she had similar chest pain when she needed a PPM. Pt states that she  In ED, Trop#1-negative, BNP-339, ECG-V-paced HR @ 60 underlying CHB.  Presented to cath holding for left heart catheterization to evaluate coronary anatomy.     Patient now status post left heart catheterization which showed diffuse multivessel disease RCA, LAD, OM. Hemostasis achieved with right radial band. Heparin 2000 units given in cath lab. Recommendations: Start Plavix; 300 mg x1 now and then continue with Plavix 75 mg daily on 11/27/21, start Imdur 30 mg daily, will start Ranexa 500 mg BID starting 11/27/21. Continue with Atorvastatin 80 mg daily and Aspirin 81 mg daily. Will maximize patient with medical management for now. If symptoms persists, High Risk PCI likely to be considered as discussed with Dr. Vivar. Follow with Dr. Allen in 2-3 weeks.    70 y/o female with medical history of HTN, DM, CVA (2019) with Right sided deficits, CHB s/p Micra PPM, who presents to [facility]-ED with chest pain. Pt states that when she was standing up not exerting herself at 1pm, she felt midsternal sharp chest pain that radiated to right shoulder and back which lasted hours. Pt states that chest pain worse with cough which she has had for past week. Pt states that she had similar chest pain when she needed a PPM. In ED, Trop#1-negative, BNP-339, ECG-V-paced HR @ 60 underlying CHB.  In ED, Trop#1-negative, BNP-339, ECG-V-paced HR @ 60 underlying CHB.  Subsequent troponin elevated 0.69 --> 0.55 --> 0.48, admitted for NSTEMI, and scheduled for left heart catheterization to evaluate coronary anatomy.       Patient now status post left heart catheterization which showed diffuse multivessel disease RCA, LAD, OM. Hemostasis achieved with right radial band. Heparin 2000 units given in cath lab. Recommendations: Start Plavix; 300 mg x1 now and then continue with Plavix 75 mg daily on 11/27/21, start Imdur 30 mg daily, Toprol 100 mg daily, will start Ranexa 500 mg BID starting 11/27/21. Continue with Atorvastatin 80 mg daily and Aspirin 81 mg daily. Will maximize patient with medical management for now. If symptoms persists, High Risk PCI likely to be considered as discussed with Dr. Vivar. Follow with Dr. Allen in 2-3 weeks.    70 y/o female with medical history of HTN, DM, CVA (2019) with Right sided deficits, CHB s/p Micra PPM, who presents to [facility]-ED with chest pain. Pt states that when she was standing up not exerting herself at 1pm, she felt midsternal sharp chest pain that radiated to right shoulder and back which lasted hours. Pt states that chest pain worse with cough which she has had for past week. Pt states that she had similar chest pain when she needed a PPM. In ED, Trop#1-negative, BNP-339, ECG-V-paced HR @ 60 underlying CHB.  In ED, Trop#1-negative, BNP-339, ECG-V-paced HR @ 60 underlying CHB.  Subsequent troponin elevated 0.69 --> 0.55 --> 0.48, admitted for NSTEMI, and scheduled for left heart catheterization to evaluate coronary anatomy.       Patient now status post left heart catheterization which showed diffuse multivessel disease RCA, LAD, OM. Hemostasis achieved with right radial band. Heparin 2000 units given in cath lab. Recommendations: Start Plavix; 300 mg x1 now and then continue with Plavix 75 mg daily on 11/27/21, start Imdur 30 mg daily, Toprol 100 mg daily, will start Ranexa 500 mg BID starting 11/27/21. Continue with Atorvastatin 80 mg daily and Aspirin 81 mg daily. Will maximize patient with medical management for now. If symptoms persists, High Risk PCI likely to be considered as discussed with Dr. Vivar. Follow with Dr. Allen in 2-3 weeks.     DOCUMENT NOT COMPLETE: Please double check pts pharmacy (pt not sure what pharmacy she uses, I could not get a hold of  and/or son)    72 y/o female with medical history of HTN, DM, CVA (2019) with Right sided deficits, CHB s/p Micra PPM, who presents to [facility]ED with chest pain. Pt states that when she was standing up not exerting herself at 1pm, she felt midsternal sharp chest pain that radiated to right shoulder and back which lasted hours. Pt states that chest pain worse with cough which she has had for past week. Pt states that she had similar chest pain when she needed a PPM. In ED, Trop#1-negative, BNP-339, ECG-V-paced HR @ 60 underlying CHB.  In ED, Trop#1-negative, BNP-339, ECG-V-paced HR @ 60 underlying CHB.  Subsequent troponin elevated 0.69 --> 0.55 --> 0.48, admitted for NSTEMI, and scheduled for left heart catheterization to evaluate coronary anatomy.       Patient now status post left heart catheterization which showed diffuse multivessel disease RCA, LAD, OM. Hemostasis achieved with right radial band. Heparin 2000 units given in cath lab. Recommendations: Start Plavix; 300 mg x1 now and then continue with Plavix 75 mg daily on 11/27/21, start Imdur 30 mg daily, Toprol 100 mg daily, will start Ranexa 500 mg BID starting 11/27/21. Continue with Atorvastatin 80 mg daily and Aspirin 81 mg daily. Will maximize patient with medical management for now. If symptoms persists, High Risk PCI likely to be considered as discussed with Dr. Vivar. Follow with Dr. Allen in 2-3 weeks.     Patient was seen by EP and had her device interrogated and adjustments made. She is to follow up with Dr. Montgomery in 2- 3 weeks.     Vital Signs Last 24 Hrs  T(C): 36.4 (28 Nov 2021 10:04), Max: 37.1 (28 Nov 2021 04:54)  T(F): 97.5 (28 Nov 2021 10:04), Max: 98.7 (28 Nov 2021 04:54)  HR: 64 (28 Nov 2021 10:04) (60 - 69)  BP: 132/77 (28 Nov 2021 10:04) (112/60 - 140/73)  RR: 18 (28 Nov 2021 10:04) (18 - 18)  SpO2: 99% (28 Nov 2021 10:04) (95% - 99%)    PHYSICAL EXAM:  Constitutional: No acute distress, alert and oriented by 3  HEENT: AT/NC, EOMI, PERRLA, Normal conjunctiva, no pharyngreal erythema, moist oral mucosa  Respiratory: CTA BL, equal breath sounds, no crackles or wheezing  Cardiovascular: RRR, no edema  Gastrointestinal: soft, Non-tender, Non-distended + Bowel sounds, no rebound or guarding  Musculoskeletal: No joint edema  Neurological: CN 2-12 grossly intact, no focal deficits  Skin: warm, dry and intact  Psychiatric: normal mood and affect    39 minutes spent on discharge.

## 2021-11-26 NOTE — DISCHARGE NOTE PROVIDER - PROVIDER TOKENS
PROVIDER:[TOKEN:[[id number]],FOLLOWUP:[2 weeks]] PROVIDER:[TOKEN:[[id number]],FOLLOWUP:[2 weeks]],PROVIDER:[TOKEN:[[id number]],FOLLOWUP:[2 weeks]]

## 2021-11-26 NOTE — PROVIDER CONTACT NOTE (CRITICAL VALUE NOTIFICATION) - DATE AND TIME:
Rx refilled just a few minutes ago. It was faxed to pharmacy and confirmation was received. 26-Nov-2021 01:24 26-Nov-2021 01:20

## 2021-11-26 NOTE — PROGRESS NOTE ADULT - ASSESSMENT
70 y/o female with NSTEMI, hx of CVA, DM-2, HTN, HLD, Neuropathy, GERD    NSTEMI s/p Cath   -S/P Cath with diffuse multivessel disease RCA, LAD, OM  -hep gtt transitioned to Lovenox  -Cont. Aspirin, high intensity statin, BB  -Loaded with plavix  -Start imdur, Ranexa  -TTE noted  -PRN nitro  -Cardiology recs appreciated  -OOB, ambulate, fall risk  -Cardiac diet    CVA:  -improved right residual deficits from 4/20  -no falls at home, no dysphagia  -cont. o/p regimen    DM-2:  -Hold Metformin   -Lantus, mealtime and coverage insulin  -ADA diet  -A1c noted    HTN:  -Hold norvasc with increased BB - Will not go home with it  -DASH diet    Neuropathy:  -Cont. Neurontin    GERD:  -PPI      Dispo: DC likely Sunday

## 2021-11-26 NOTE — DISCHARGE NOTE PROVIDER - CARE PROVIDERS DIRECT ADDRESSES
[email or web address]

## 2021-11-26 NOTE — PROVIDER CONTACT NOTE (OTHER) - SITUATION
Pt returned from cardiac cath for a left heart cath. VSS. On cardiac monitor pt has a first degree heart block.
Pt on heparin drip, Aptt of 123.7

## 2021-11-26 NOTE — PROGRESS NOTE ADULT - SUBJECTIVE AND OBJECTIVE BOX
Department of Cardiology                                                                  [facility]/[place] [address] [address] [facility] [address]                                                            Telephone: [phone]. Fax:[phone]                                                                                      Cardiac Cath NP Note       Narrative:  71y  Female is now s/p left heart catheterization via (right or left radial or groin) approach with  ____which showed:         PAST MEDICAL & SURGICAL HISTORY:  DM (diabetes mellitus)    HTN (hypertension)    H/O gastroesophageal reflux (GERD)    Cardiac pacemaker  MICRA for mobitz type 11    CVA (cerebrovascular accident)  resdiual right sided weakness    History of benign brain tumor      FAMILY HISTORY:  FH: asthma  Son      Home Medications:  acetaminophen 325 mg oral tablet: 2 tab(s) orally every 6 hours, As needed, Temp greater or equal to 38C (100.4F), Mild Pain (1 - 3) (22 May 2020 08:20)  aspirin 81 mg oral delayed release tablet: 1 tab(s) orally once a day (22 May 2020 08:20)  famotidine 20 mg oral tablet: 1 tab(s) orally once a day (22 May 2020 08:20)  omeprazole 20 mg oral delayed release capsule: 1 cap(s) orally once a day (25 Nov 2021 02:14)          13.7   11.56 )-----------( 269      ( 26 Nov 2021 06:58 )             41.5     11-26    133<L>  |  96<L>  |  28.7<H>  ----------------------------<  338<H>  4.1   |  22.0  |  1.11    Ca    9.8      26 Nov 2021 06:58  Mg     2.0     11-26    TPro  7.3  /  Alb  4.1  /  TBili  0.4  /  DBili  x   /  AST  26  /  ALT  14  /  AlkPhos  93  11-26  PT/INR - ( 25 Nov 2021 00:50 )   PT: 13.3 sec;   INR: 1.15 ratio    PTT - ( 26 Nov 2021 08:22 )  PTT:116.4 sec      General: No fatigue, no fevers/chills  Respiratory: No dyspnea, no cough, no wheeze  CV: No chest pain, no palpitations  Abd: No nausea  Neuro: No headache, no dizziness  No Known Allergies      Objective:  Vital Signs Last 24 Hrs  T(C): 36.7 (26 Nov 2021 04:40), Max: 37 (25 Nov 2021 17:36)  T(F): 98.1 (26 Nov 2021 04:40), Max: 98.6 (25 Nov 2021 17:36)  HR: 60 (26 Nov 2021 04:40) (60 - 60)  BP: 147/79 (26 Nov 2021 04:40) (105/50 - 147/79)  RR: 18 (26 Nov 2021 04:40) (18 - 19)  SpO2: 94% (26 Nov 2021 04:40) (93% - 98%)    CM: SR  Neuro: A&OX3, CN 2-12 intact  HEENT: NC, AT  Lungs: CTA B/L  CV: S1, S2, no murmur, RRR  Abd: Soft  Right Groin: Soft, no bleeding, no hematoma  Extremity: + distal pulses  EKG:                       -post cardiac cath orders  -radial or groin precautions  -continue current medical therapy  -DAPT (ASA and plavix)  -statin  -BB  -follow up outpt in 2 weeks                                                          Department of Cardiology                                                     [facility]/[place] [address] [address] [facility]                                             Telephone: [phone]. Fax:[phone]                                                             Progress Note Cardiology        Narrative:    70 y/o female with medical history of HTN, DM, CVA (2019) with Right sided deficits, CHB s/p Micra PPM, who presents to [facility]ED with chest pain. Pt states that when she was standing up not exerting herself at 1pm, she felt midsternal sharp chest pain that radiated to right shoulder and back which lasted hours. Pt states that chest pain worse with cough which she has had for past week. Pt states that she had similar chest pain when she needed a PPM. Pt states that she  In ED, Trop#1-negative, BNP-339, ECG-V-paced HR @ 60 underlying CHB.      Above HPI noted:  Patient seen and examined in cath holding, in NAD. Has right sided deficits from CA (2019). V Paced on Tele. No complain of chest pain, sob or palpitations. On heparin gtt for NSTEMI, to be held in cath hold. Apirin 81 mg prior to cath.          PAST MEDICAL & SURGICAL HISTORY:  DM (diabetes mellitus)  HTN (hypertension)  H/O gastroesophageal reflux (GERD)  Cardiac pacemaker - MICRA for mobitz type 11  CVA (cerebrovascular accident) - resdiual right sided weakness  History of benign brain tumor      FAMILY HISTORY:  FH: asthma (Son)       Home Medications:  acetaminophen 325 mg oral tablet: 2 tab(s) orally every 6 hours, As needed, Temp greater or equal to 38C (100.4F), Mild Pain (1 - 3) (22 May 2020 08:20)  aspirin 81 mg oral delayed release tablet: 1 tab(s) orally once a day (22 May 2020 08:20)  famotidine 20 mg oral tablet: 1 tab(s) orally once a day (22 May 2020 08:20)  omeprazole 20 mg oral delayed release capsule: 1 cap(s) orally once a day (25 Nov 2021 02:14)          13.7   11.56 )-----------( 269      ( 26 Nov 2021 06:58 )             41.5     11-26    133<L>  |  96<L>  |  28.7<H>  ----------------------------<  338<H>  4.1   |  22.0  |  1.11    Ca    9.8      26 Nov 2021 06:58  Mg     2.0     11-26    TPro  7.3  /  Alb  4.1  /  TBili  0.4  /  DBili  x   /  AST  26  /  ALT  14  /  AlkPhos  93  11-26  PT/INR - ( 25 Nov 2021 00:50 )   PT: 13.3 sec;   INR: 1.15 ratio    PTT - ( 26 Nov 2021 08:22 )  PTT:116.4 sec      General: No fatigue, no fevers/chills  Respiratory: No dyspnea, no cough, no wheeze  CV: No chest pain, no palpitations  Abd: No nausea  Neuro: No headache, no dizziness  No Known Allergies      Objective:  Vital Signs Last 24 Hrs  T(C): 36.7 (26 Nov 2021 04:40), Max: 37 (25 Nov 2021 17:36)  T(F): 98.1 (26 Nov 2021 04:40), Max: 98.6 (25 Nov 2021 17:36)  HR: 60 (26 Nov 2021 04:40) (60 - 60)  BP: 147/79 (26 Nov 2021 04:40) (105/50 - 147/79)  RR: 18 (26 Nov 2021 04:40) (18 - 19)  SpO2: 94% (26 Nov 2021 04:40) (93% - 98%)    CM: SR  Neuro: A&OX3, CN 2-12 intact  HEENT: NC, AT  Lungs: CTA B/L  CV: S1, S2, no murmur, RRR  Abd: Soft  Right Groin: Soft, no bleeding, no hematoma  Extremity: + distal pulses  EKG:     V Paced; LBBB     DIAGNOSTICS:     < from: TTE Echo Complete w/o Contrast w/ Doppler (11.25.21 @ 12:40) >  PHYSICIAN INTERPRETATION:  Left Ventricle: The left ventricular internal cavity size is normal.  Global LV systolic function was normal. Left ventricular ejection fraction, by visual estimation, is 55 to 60%. Spectral Doppler shows normal pattern of LV diastolic filling.  Right Ventricle: Normal right ventricular size and function.  Left Atrium: The left atrium is normal in size.  Right Atrium: The right atrium is normal in size.  Pericardium: There is no evidence of pericardial effusion.  Mitral Valve: Structurally normal mitral valve, with normal leaflet excursion. Mild thickening and calcification of the anterior and posterior mitral valve leaflets. There is mild mitral annular calcification. Trace mitral valve regurgitation is seen.  Tricuspid Valve: The tricuspid valve is normal in structure. Trivial tricuspid regurgitation is visualized.  Aortic Valve: The aortic valve is trileaflet. No evidence of aortic stenosis. Sclerotic aortic valve with normal opening. No evidence of aortic valve regurgitation is seen.  Pulmonic Valve: The pulmonic valve was notwell visualized.  Aorta: The aortic root is normal in size and structure.  Venous: The inferior vena cava was normal sized, with respiratory size variation greater than 50%.      Summary:   1. Left ventricular ejection fraction, by visual estimation,is 55 to 60%.   2. Normal global left ventricular systolic function.   3. Normal left ventricular internal cavity size.   4. Normal right ventricular size and function.   5. The left atrium is normal in size.   6. Mild mitral annular calcification.  7. Mild thickening and calcification of the anterior and posterior mitral valve leaflets.   8. Trace mitral valve regurgitation.   9. Sclerotic aortic valve with normal opening.  10. There is no evidence of pericardial effusion.    MD LanceElectronically signed on 11/25/2021 at 1:59:26 PM    *** Final ***    ORLANDO CRONIN MD; Attending Cardiologist  This document has been electronically signed. Nov 25 2021 12:40PM    < end of copied text >      ASSESSMENT AND PLAN:     70 y/o female with medical history of HLD,  HTN, DM, CVA (2019) with Right sided deficits, CHB s/p Micra PPM, who presents to [facility]-ED with chest pain. Initially trop negative then started trending up, in addition was admitted as NSTEMI. Echo done with normal LV systolic function, EF 55-60%. She now is scheduled for C to evaluate coronary anatomy.       -Patient seen and examined  -Labs and EKG reviewed   -VSS, no events on telemetry  -EKG (11/25) V Paced, known LBBB  -admitted for chest pain  -initially trop neg x2; subsequently rising now trending 0.69 --> 0.55 --> 0.48  -upgraded to NSTEMI and plan for [facility] to evaluate coronary anatomy   -on heparin gtt and aspirin, heparin stopped on arrival to cath holding  -Asprin 81 mg po prior to cath  -GFR 51, IVF per protocol;  ml bolus prior to cath  -will follow results of catheterization   -Pre-procedure teaching completed with patient, questions answered   -Informed consent to be obtained by attending   -pt instructed IF STENT PLACED WILL REQUIRE TO STAY OVERNIGHT FOR MONITORING

## 2021-11-26 NOTE — DISCHARGE NOTE PROVIDER - NSDCMRMEDTOKEN_GEN_ALL_CORE_FT
acetaminophen 325 mg oral tablet: 2 tab(s) orally every 6 hours, As needed, Temp greater or equal to 38C (100.4F), Mild Pain (1 - 3)  aspirin 81 mg oral delayed release tablet: 1 tab(s) orally once a day  famotidine 20 mg oral tablet: 1 tab(s) orally once a day  gabapentin 300 mg oral capsule: 1 cap(s) orally once a day (at bedtime)  insulin glargine 100 units/mL subcutaneous solution: 20 unit(s) subcutaneous once a day (at bedtime)    acetaminophen 325 mg oral tablet: 2 tab(s) orally every 6 hours, As needed, Temp greater or equal to 38C (100.4F), Mild Pain (1 - 3)  aspirin 81 mg oral delayed release tablet: 1 tab(s) orally once a day  atorvastatin 80 mg oral tablet: 1 tab(s) orally once a day (at bedtime)  clopidogrel 75 mg oral tablet: 1 tab(s) orally once a day  famotidine 20 mg oral tablet: 1 tab(s) orally once a day  gabapentin 300 mg oral capsule: 1 cap(s) orally once a day (at bedtime)  insulin glargine 100 units/mL subcutaneous solution: 20 unit(s) subcutaneous once a day (at bedtime)   isosorbide mononitrate 30 mg oral tablet, extended release: 1 tab(s) orally once a day  metFORMIN 1000 mg oral tablet: 1 tab(s) orally 2 times a day (with meals) - RESTART on Sunday 11/ 28/21  metoprolol succinate 100 mg oral tablet, extended release: 1 tab(s) orally once a day  ranolazine 500 mg oral tablet, extended release: 1 tab(s) orally 2 times a day

## 2021-11-26 NOTE — PROGRESS NOTE ADULT - SUBJECTIVE AND OBJECTIVE BOX
Department of Cardiology                                                     [facility]/[place] [address] [address] [facility]                                             Telephone: [phone]. Fax:[phone]                                                            Post Procedure Cath Note         Narrative:    72 y/o female with medical history of HTN, DM, CVA (2019) with Right sided deficits, CHB s/p Micra PPM, who presents to [facility]-ED with chest pain. Pt states that when she was standing up not exerting herself at 1pm, she felt midsternal sharp chest pain that radiated to right shoulder and back which lasted hours. Pt states that chest pain worse with cough which she has had for past week. Pt states that she had similar chest pain when she needed a PPM. Pt states that she  In ED, Trop#1-negative, BNP-339, ECG-V-paced HR @ 60 underlying CHB.    HPI noted. Patient seen and examined in cath holding, in NAD. Has right sided deficits from CA (2019). V Paced on Tele. No complain of chest pain, sob or palpitations. On heparin gtt for NSTEMI, to be held in cath hold. Apirin 81 mg prior to cath.        Patient now status post left heart catheterization which showed diffuse multivessel disease RCA, LAD, OM. Hemostasis achieved with right radial band. Heparin 2000 units given in cath lab. Recommendations: Start Plavix; 300 mg x1 now and then continue with Plavix 75 mg daily on 11/27/21, start Imdur 30 mg daily, will start Ranexa 500 mg BID starting 11/27/21. Continue with Atorvastatin 80 mg daily and Aspirin 81 mg daily. Will maximize patient with medical management for now. If symptoms persists, High Risk PCI likely to be considered as discussed with Dr. Vivar.         PAST MEDICAL & SURGICAL HISTORY:  DM (diabetes mellitus)  HTN (hypertension)  H/O gastroesophageal reflux (GERD)  Cardiac pacemaker - MICRA for mobitz type 11  CVA (cerebrovascular accident) - resdiual right sided weakness  History of benign brain tumor      FAMILY HISTORY:  FH: asthma (Son)       Home Medications:  acetaminophen 325 mg oral tablet: 2 tab(s) orally every 6 hours, As needed, Temp greater or equal to 38C (100.4F), Mild Pain (1 - 3) (22 May 2020 08:20)  aspirin 81 mg oral delayed release tablet: 1 tab(s) orally once a day (22 May 2020 08:20)  famotidine 20 mg oral tablet: 1 tab(s) orally once a day (22 May 2020 08:20)  omeprazole 20 mg oral delayed release capsule: 1 cap(s) orally once a day (25 Nov 2021 02:14)          13.7   11.56 )-----------( 269      ( 26 Nov 2021 06:58 )             41.5     11-26    133<L>  |  96<L>  |  28.7<H>  ----------------------------<  338<H>  4.1   |  22.0  |  1.11    Ca    9.8      26 Nov 2021 06:58  Mg     2.0     11-26    TPro  7.3  /  Alb  4.1  /  TBili  0.4  /  DBili  x   /  AST  26  /  ALT  14  /  AlkPhos  93  11-26  PT/INR - ( 25 Nov 2021 00:50 )   PT: 13.3 sec;   INR: 1.15 ratio    PTT - ( 26 Nov 2021 08:22 )  PTT:116.4 sec      General: No fatigue, no fevers/chills  Respiratory: No dyspnea, no cough, no wheeze  CV: No chest pain, no palpitations  Abd: No nausea  Neuro: No headache, no dizziness  No Known Allergies      Objective:  Vital Signs Last 24 Hrs  T(C): 36.7 (26 Nov 2021 04:40), Max: 37 (25 Nov 2021 17:36)  T(F): 98.1 (26 Nov 2021 04:40), Max: 98.6 (25 Nov 2021 17:36)  HR: 60 (26 Nov 2021 04:40) (60 - 60)  BP: 147/79 (26 Nov 2021 04:40) (105/50 - 147/79)  RR: 18 (26 Nov 2021 04:40) (18 - 19)  SpO2: 94% (26 Nov 2021 04:40) (93% - 98%)    CM: SR  Neuro: A&OX3, CN 2-12 intact  HEENT: NC, AT  Lungs: CTA B/L  CV: S1, S2, no murmur, RRR  Abd: Soft  Right Groin: Soft, no bleeding, no hematoma  Extremity: + distal pulses  EKG:     V Paced; LBBB     DIAGNOSTICS:     < from: TTE Echo Complete w/o Contrast w/ Doppler (11.25.21 @ 12:40) >  PHYSICIAN INTERPRETATION:  Left Ventricle: The left ventricular internal cavity size is normal.  Global LV systolic function was normal. Left ventricular ejection fraction, by visual estimation, is 55 to 60%. Spectral Doppler shows normal pattern of LV diastolic filling.  Right Ventricle: Normal right ventricular size and function.  Left Atrium: The left atrium is normal in size.  Right Atrium: The right atrium is normal in size.  Pericardium: There is no evidence of pericardial effusion.  Mitral Valve: Structurally normal mitral valve, with normal leaflet excursion. Mild thickening and calcification of the anterior and posterior mitral valve leaflets. There is mild mitral annular calcification. Trace mitral valve regurgitation is seen.  Tricuspid Valve: The tricuspid valve is normal in structure. Trivial tricuspid regurgitation is visualized.  Aortic Valve: The aortic valve is trileaflet. No evidence of aortic stenosis. Sclerotic aortic valve with normal opening. No evidence of aortic valve regurgitation is seen.  Pulmonic Valve: The pulmonic valve was notwell visualized.  Aorta: The aortic root is normal in size and structure.  Venous: The inferior vena cava was normal sized, with respiratory size variation greater than 50%.      Summary:   1. Left ventricular ejection fraction, by visual estimation,is 55 to 60%.   2. Normal global left ventricular systolic function.   3. Normal left ventricular internal cavity size.   4. Normal right ventricular size and function.   5. The left atrium is normal in size.   6. Mild mitral annular calcification.  7. Mild thickening and calcification of the anterior and posterior mitral valve leaflets.   8. Trace mitral valve regurgitation.   9. Sclerotic aortic valve with normal opening.  10. There is no evidence of pericardial effusion.    MD LanceElectronically signed on 11/25/2021 at 1:59:26 PM    *** Final ***    ORLANDO CRONIN MD; Attending Cardiologist  This document has been electronically signed. Nov 25 2021 12:40PM    < end of copied text >      ASSESSMENT AND PLAN:     72 y/o female with medical history of HLD,  HTN, DM, CVA (2019) with Right sided deficits, CHB s/p Micra PPM, who presents to [facility]-ED with chest pain. Initially trop negative then started trending up, in addition was admitted as NSTEMI. Echo done with normal LV systolic function, EF 55-60%. She now is scheduled for LHC to evaluate coronary anatomy. Patient now status post LHC which showed multivessel disease.         -Formal cath report pending  -Post procedure management/monitoring per protocol  -Access site precautions, right radial accesss  -RRA with palpable pulses, no ecchymosis or hematoma, no tingling or numbness  -has palpable pulses   -Labs and EKG in am  -Repeat ECG if any clinical indication or change on tele  -d/c heparin gtt, continue with Lovenox full dose (65 mg) for 2-3 days   -Start medical management of multivessel disease with DAPT with Plavix and Aspirin  -Plavix 300 mg x1 now; then continue with Plavix 75 mg daily starting 11/27/21  -start Imdur 30 mg daily today  -start Ranexa 500 mg twice a day starting 11/27/21  -Continue current medical therapy; high dose statin, BB  -post LHC pt vomited x1 in cath holding; Zofran given    -Educated regarding strict adherence with DAPT   -Educated regarding post procedure management and care  -Discussed the importance of risk factors modification  -patient not a candidate for cardiac rehab secondary to:  no PCI with LHC; found with multivessel disease for which medical management is recommended                                                                         Department of Cardiology                                                     [facility]/[place] [address] [address] [facility]                                             Telephone: [phone]. Fax:[phone]                                                            Post Procedure Cath Note         Narrative:    70 y/o female with medical history of HTN, DM, CVA (2019) with Right sided deficits, CHB s/p Micra PPM, who presents to [facility]ED with chest pain. Pt states that when she was standing up not exerting herself at 1pm, she felt midsternal sharp chest pain that radiated to right shoulder and back which lasted hours. Pt states that chest pain worse with cough which she has had for past week. Pt states that she had similar chest pain when she needed a PPM. Pt states that she  In ED, Trop#1-negative, BNP-339, ECG-V-paced HR @ 60 underlying CHB.    HPI noted. Patient seen and examined in cath holding, in NAD. Has right sided deficits from CA (2019). V Paced on Tele. No complain of chest pain, sob or palpitations. On heparin gtt for NSTEMI, to be held in cath hold. Apirin 81 mg prior to cath.        Patient now status post left heart catheterization which showed diffuse multivessel disease RCA, LAD, OM. Hemostasis achieved with right radial band. Heparin 2000 units given in cath lab. Recommendations: Start Plavix; 300 mg x1 now and then continue with Plavix 75 mg daily on 11/27/21, start Imdur 30 mg daily, will start Ranexa 500 mg BID starting 11/27/21. Continue with Atorvastatin 80 mg daily and Aspirin 81 mg daily. Will maximize patient with medical management for now. If symptoms persists, High Risk PCI likely to be considered as discussed with Dr. Vivar.         PAST MEDICAL & SURGICAL HISTORY:  DM (diabetes mellitus)  HTN (hypertension)  H/O gastroesophageal reflux (GERD)  Cardiac pacemaker - MICRA for mobitz type 11  CVA (cerebrovascular accident) - resdiual right sided weakness  History of benign brain tumor      FAMILY HISTORY:  FH: asthma (Son)       Home Medications:  acetaminophen 325 mg oral tablet: 2 tab(s) orally every 6 hours, As needed, Temp greater or equal to 38C (100.4F), Mild Pain (1 - 3) (22 May 2020 08:20)  aspirin 81 mg oral delayed release tablet: 1 tab(s) orally once a day (22 May 2020 08:20)  famotidine 20 mg oral tablet: 1 tab(s) orally once a day (22 May 2020 08:20)  omeprazole 20 mg oral delayed release capsule: 1 cap(s) orally once a day (25 Nov 2021 02:14)          13.7   11.56 )-----------( 269      ( 26 Nov 2021 06:58 )             41.5     11-26    133<L>  |  96<L>  |  28.7<H>  ----------------------------<  338<H>  4.1   |  22.0  |  1.11    Ca    9.8      26 Nov 2021 06:58  Mg     2.0     11-26    TPro  7.3  /  Alb  4.1  /  TBili  0.4  /  DBili  x   /  AST  26  /  ALT  14  /  AlkPhos  93  11-26  PT/INR - ( 25 Nov 2021 00:50 )   PT: 13.3 sec;   INR: 1.15 ratio    PTT - ( 26 Nov 2021 08:22 )  PTT:116.4 sec      General: No fatigue, no fevers/chills  Respiratory: No dyspnea, no cough, no wheeze  CV: No chest pain, no palpitations  Abd: No nausea  Neuro: No headache, no dizziness  No Known Allergies      Objective:  Vital Signs Last 24 Hrs  T(C): 36.7 (26 Nov 2021 04:40), Max: 37 (25 Nov 2021 17:36)  T(F): 98.1 (26 Nov 2021 04:40), Max: 98.6 (25 Nov 2021 17:36)  HR: 60 (26 Nov 2021 04:40) (60 - 60)  BP: 147/79 (26 Nov 2021 04:40) (105/50 - 147/79)  RR: 18 (26 Nov 2021 04:40) (18 - 19)  SpO2: 94% (26 Nov 2021 04:40) (93% - 98%)    CM: SR  Neuro: A&OX3, CN 2-12 intact  HEENT: NC, AT  Lungs: CTA B/L  CV: S1, S2, no murmur, RRR  Abd: Soft  Right Groin: Soft, no bleeding, no hematoma  Extremity: + distal pulses  EKG:     V Paced; LBBB     DIAGNOSTICS:     < from: TTE Echo Complete w/o Contrast w/ Doppler (11.25.21 @ 12:40) >  PHYSICIAN INTERPRETATION:  Left Ventricle: The left ventricular internal cavity size is normal.  Global LV systolic function was normal. Left ventricular ejection fraction, by visual estimation, is 55 to 60%. Spectral Doppler shows normal pattern of LV diastolic filling.  Right Ventricle: Normal right ventricular size and function.  Left Atrium: The left atrium is normal in size.  Right Atrium: The right atrium is normal in size.  Pericardium: There is no evidence of pericardial effusion.  Mitral Valve: Structurally normal mitral valve, with normal leaflet excursion. Mild thickening and calcification of the anterior and posterior mitral valve leaflets. There is mild mitral annular calcification. Trace mitral valve regurgitation is seen.  Tricuspid Valve: The tricuspid valve is normal in structure. Trivial tricuspid regurgitation is visualized.  Aortic Valve: The aortic valve is trileaflet. No evidence of aortic stenosis. Sclerotic aortic valve with normal opening. No evidence of aortic valve regurgitation is seen.  Pulmonic Valve: The pulmonic valve was notwell visualized.  Aorta: The aortic root is normal in size and structure.  Venous: The inferior vena cava was normal sized, with respiratory size variation greater than 50%.      Summary:   1. Left ventricular ejection fraction, by visual estimation,is 55 to 60%.   2. Normal global left ventricular systolic function.   3. Normal left ventricular internal cavity size.   4. Normal right ventricular size and function.   5. The left atrium is normal in size.   6. Mild mitral annular calcification.  7. Mild thickening and calcification of the anterior and posterior mitral valve leaflets.   8. Trace mitral valve regurgitation.   9. Sclerotic aortic valve with normal opening.  10. There is no evidence of pericardial effusion.    MD LanceElectronically signed on 11/25/2021 at 1:59:26 PM    *** Final ***    ORLANDO CRONIN MD; Attending Cardiologist  This document has been electronically signed. Nov 25 2021 12:40PM    < end of copied text >      ASSESSMENT AND PLAN:     70 y/o female with medical history of HLD,  HTN, DM, CVA (2019) with Right sided deficits, CHB s/p Micra PPM, who presents to [facility]-ED with chest pain. Initially trop negative then started trending up, in addition was admitted as NSTEMI. Echo done with normal LV systolic function, EF 55-60%. She now is scheduled for C to evaluate coronary anatomy. Patient now status post C which showed multivessel disease.         -Formal cath report pending  -s/p LHC with diffuse distal multivessel disease, medical management recommended   -Post procedure management/monitoring per protocol  -Access site precautions, right radial access  -RRA with palpable pulses, no ecchymosis or hematoma, no tingling or numbness  -has palpable pulses   -band to be removed 12:30 if site stable and no signs of bleeding or hematoma   -Labs and EKG in am, no need to continue to trend troponin  -Repeat ECG if any clinical indication or change on tele  -d/c heparin gtt, continue with Lovenox full dose (65 mg) for 2 days   -Start medical management of multivessel disease with DAPT with Plavix and Aspirin  -Plavix 300 mg x1 now; then continue with Plavix 75 mg daily starting 11/27/21  -start Imdur 30 mg daily today  -start Ranexa 500 mg twice a day starting 11/27/21  -Continue current medical therapy; high dose statin, BB  -post LHC pt vomited x1 in cath holding; Zofran given    -Educated regarding strict adherence with DAPT   -Educated regarding post procedure management and care  -Discussed the importance of risk factors modification  -patient not a candidate for cardiac rehab secondary to:  no PCI with LHC; found with multivessel disease for which medical management is recommended   -above discussed with Dr. Vivar and Dr. David                                                                       Department of Cardiology                                                     [facility]/[place] [address] [address] [facility]                                             Telephone: [phone]. Fax:[phone]                                                            Post Procedure Cath Note         Narrative:    70 y/o female with medical history of HTN, DM, CVA (2019) with Right sided deficits, CHB s/p Micra PPM, who presents to [facility]-ED with chest pain. Pt states that when she was standing up not exerting herself at 1pm, she felt midsternal sharp chest pain that radiated to right shoulder and back which lasted hours. Pt states that chest pain worse with cough which she has had for past week. Pt states that she had similar chest pain when she needed a PPM. Pt states that she  In ED, Trop#1-negative, BNP-339, ECG-V-paced HR @ 60 underlying CHB.    HPI noted. Patient seen and examined in cath holding, in NAD. Has right sided deficits from CA (2019). V Paced on Tele. No complain of chest pain, sob or palpitations. On heparin gtt for NSTEMI, to be held in cath hold. Apirin 81 mg prior to cath.        Patient now status post left heart catheterization which showed diffuse multivessel disease RCA, LAD, OM. Hemostasis achieved with right radial band. Heparin 2000 units given in cath lab. Recommendations: Start Plavix; 300 mg x1 now and then continue with Plavix 75 mg daily on 11/27/21, start Imdur 30 mg daily, will start Ranexa 500 mg BID starting 11/27/21. Continue with Atorvastatin 80 mg daily and Aspirin 81 mg daily. Will maximize patient with medical management for now. If symptoms persists, High Risk PCI likely to be considered as discussed with Dr. Vivar.         PAST MEDICAL & SURGICAL HISTORY:  DM (diabetes mellitus)  HTN (hypertension)  H/O gastroesophageal reflux (GERD)  Cardiac pacemaker - MICRA for mobitz type 11  CVA (cerebrovascular accident) - resdiual right sided weakness  History of benign brain tumor      FAMILY HISTORY:  FH: asthma (Son)       Home Medications:  acetaminophen 325 mg oral tablet: 2 tab(s) orally every 6 hours, As needed, Temp greater or equal to 38C (100.4F), Mild Pain (1 - 3) (22 May 2020 08:20)  aspirin 81 mg oral delayed release tablet: 1 tab(s) orally once a day (22 May 2020 08:20)  famotidine 20 mg oral tablet: 1 tab(s) orally once a day (22 May 2020 08:20)  omeprazole 20 mg oral delayed release capsule: 1 cap(s) orally once a day (25 Nov 2021 02:14)          13.7   11.56 )-----------( 269      ( 26 Nov 2021 06:58 )             41.5     11-26    133<L>  |  96<L>  |  28.7<H>  ----------------------------<  338<H>  4.1   |  22.0  |  1.11    Ca    9.8      26 Nov 2021 06:58  Mg     2.0     11-26    TPro  7.3  /  Alb  4.1  /  TBili  0.4  /  DBili  x   /  AST  26  /  ALT  14  /  AlkPhos  93  11-26  PT/INR - ( 25 Nov 2021 00:50 )   PT: 13.3 sec;   INR: 1.15 ratio    PTT - ( 26 Nov 2021 08:22 )  PTT:116.4 sec      General: No fatigue, no fevers/chills  Respiratory: No dyspnea, no cough, no wheeze  CV: No chest pain, no palpitations  Abd: No nausea  Neuro: No headache, no dizziness  No Known Allergies      Objective:  Vital Signs Last 24 Hrs  T(C): 36.7 (26 Nov 2021 04:40), Max: 37 (25 Nov 2021 17:36)  T(F): 98.1 (26 Nov 2021 04:40), Max: 98.6 (25 Nov 2021 17:36)  HR: 60 (26 Nov 2021 04:40) (60 - 60)  BP: 147/79 (26 Nov 2021 04:40) (105/50 - 147/79)  RR: 18 (26 Nov 2021 04:40) (18 - 19)  SpO2: 94% (26 Nov 2021 04:40) (93% - 98%)    CM: SR  Neuro: A&OX3, CN 2-12 intact  HEENT: NC, AT  Lungs: CTA B/L  CV: S1, S2, no murmur, RRR  Abd: Soft  Right Groin: Soft, no bleeding, no hematoma  Extremity: + distal pulses  EKG:     V Paced; LBBB     DIAGNOSTICS:     < from: TTE Echo Complete w/o Contrast w/ Doppler (11.25.21 @ 12:40) >  PHYSICIAN INTERPRETATION:  Left Ventricle: The left ventricular internal cavity size is normal.  Global LV systolic function was normal. Left ventricular ejection fraction, by visual estimation, is 55 to 60%. Spectral Doppler shows normal pattern of LV diastolic filling.  Right Ventricle: Normal right ventricular size and function.  Left Atrium: The left atrium is normal in size.  Right Atrium: The right atrium is normal in size.  Pericardium: There is no evidence of pericardial effusion.  Mitral Valve: Structurally normal mitral valve, with normal leaflet excursion. Mild thickening and calcification of the anterior and posterior mitral valve leaflets. There is mild mitral annular calcification. Trace mitral valve regurgitation is seen.  Tricuspid Valve: The tricuspid valve is normal in structure. Trivial tricuspid regurgitation is visualized.  Aortic Valve: The aortic valve is trileaflet. No evidence of aortic stenosis. Sclerotic aortic valve with normal opening. No evidence of aortic valve regurgitation is seen.  Pulmonic Valve: The pulmonic valve was notwell visualized.  Aorta: The aortic root is normal in size and structure.  Venous: The inferior vena cava was normal sized, with respiratory size variation greater than 50%.      Summary:   1. Left ventricular ejection fraction, by visual estimation,is 55 to 60%.   2. Normal global left ventricular systolic function.   3. Normal left ventricular internal cavity size.   4. Normal right ventricular size and function.   5. The left atrium is normal in size.   6. Mild mitral annular calcification.  7. Mild thickening and calcification of the anterior and posterior mitral valve leaflets.   8. Trace mitral valve regurgitation.   9. Sclerotic aortic valve with normal opening.  10. There is no evidence of pericardial effusion.    MD LanceElectronically signed on 11/25/2021 at 1:59:26 PM    *** Final ***    ORLANDO CRONIN MD; Attending Cardiologist  This document has been electronically signed. Nov 25 2021 12:40PM    < end of copied text >      ASSESSMENT AND PLAN:     70 y/o female with medical history of HLD,  HTN, DM, CVA (2019) with Right sided deficits, CHB s/p Micra PPM, who presents to [facility]-ED with chest pain. Initially trop negative then started trending up, in addition was admitted as NSTEMI. Echo done with normal LV systolic function, EF 55-60%. She now is scheduled for C to evaluate coronary anatomy. Patient now status post [facility] which showed multivessel disease.         -Formal cath report pending  -s/p LHC with diffuse distal multivessel disease, medical management recommended   -Post procedure management/monitoring per protocol  -Access site precautions, right radial access  -RRA with palpable pulses, no ecchymosis or hematoma, no tingling or numbness  -has palpable pulses   -band to be removed 12:30 if site stable and no signs of bleeding or hematoma   -Labs and EKG in am, no need to continue to trend troponin  -Repeat ECG if any clinical indication or change on tele  -d/c heparin gtt, continue with Lovenox full dose (65 mg) for 2 days   -Start medical management of multivessel disease with DAPT with Plavix and Aspirin  -Plavix 300 mg x1 now; then continue with Plavix 75 mg daily starting 11/27/21  -start Imdur 30 mg daily today  -start Ranexa 500 mg twice a day starting 11/27/21  -Continue current medical therapy; high dose statin, start Toprol  mg daily staring today   -post C pt vomited x1 in cath holding; Zofran given    -Educated regarding strict adherence with DAPT   -Educated regarding post procedure management and care  -Discussed the importance of risk factors modification  -patient not a candidate for cardiac rehab secondary to:  no PCI with C; found with multivessel disease for which medical management is recommended   -above discussed with Dr. Vivar and Dr. David and Dr. Allen  -if pt stable will likely d/c on Sunday as discussed with Dr. Allen

## 2021-11-26 NOTE — DISCHARGE NOTE PROVIDER - CARE PROVIDER_API CALL
Travis Allen)  Cardiology; Cardiovascular Disease; Internal Medicine  [address] [place] [address]  Phone: [phone]  Fax: [phone]  Follow Up Time: 2 weeks   Travis Allen)  Cardiology; Cardiovascular Disease; Internal Medicine  [address] [place] [address]  Phone: [phone]  Fax: [phone]  Follow Up Time: 2 weeks    Jason Montgomery)  Cardiac Electrophysiology; Cardiology  [address] [facility] [address]  [place] [address]  Phone: [phone]  Fax: [phone]  Follow Up Time: 2 weeks

## 2021-11-26 NOTE — DISCHARGE NOTE PROVIDER - NSDCCPTREATMENT_GEN_ALL_CORE_FT
PRINCIPAL PROCEDURE  Procedure: Left heart cardiac catheterization  Findings and Treatment: Patient now status post left heart catheterization which showed diffuse multivessel disease RCA, LAD, OM. Hemostasis achieved with right radial band. Heparin 2000 units given in cath lab. Recommendations: Start Plavix; 300 mg x1 now and then continue with Plavix 75 mg daily on 11/27/21, start Imdur 30 mg daily, Toptrol  mg daily, will start Ranexa 500 mg BID starting 11/27/21. Continue with Atorvastatin 80 mg daily and Aspirin 81 mg daily. Will maximize patient with medical management for now. If symptoms persists, High Risk PCI likely to be considered as discussed with Dr. Vivar. Follow with Dr. Allen in 2-3 weeks.

## 2021-11-26 NOTE — PROGRESS NOTE ADULT - SUBJECTIVE AND OBJECTIVE BOX
Hospitalist Daily Progress Note    Chief Complaint:  Patient is a 71y old  Female who presents with a chief complaint of NSTEMI (26 Nov 2021 12:46)      SUBJECTIVE / OVERNIGHT EVENTS:  Patient was seen and examined at bedside. son and  at bedside.   Patient denies chest pain, SOB, abd pain, N/V, fever, chills, dysuria or any other complaints. All remainder ROS negative.     MEDICATIONS  (STANDING):  aspirin  chewable 81 milliGRAM(s) Oral daily  atorvastatin 80 milliGRAM(s) Oral at bedtime  dextrose 40% Gel 15 Gram(s) Oral once  dextrose 5%. 1000 milliLiter(s) (50 mL/Hr) IV Continuous <Continuous>  dextrose 50% Injectable 25 Gram(s) IV Push once  enoxaparin Injectable 65 milliGRAM(s) SubCutaneous two times a day  gabapentin 300 milliGRAM(s) Oral three times a day  glucagon  Injectable 1 milliGRAM(s) IntraMuscular once  insulin glargine Injectable (LANTUS) 10 Unit(s) SubCutaneous at bedtime  insulin lispro (ADMELOG) corrective regimen sliding scale   SubCutaneous Before meals and at bedtime  insulin lispro Injectable (ADMELOG) 4 Unit(s) SubCutaneous before breakfast  insulin lispro Injectable (ADMELOG) 4 Unit(s) SubCutaneous before lunch  insulin lispro Injectable (ADMELOG) 4 Unit(s) SubCutaneous before dinner  isosorbide   mononitrate ER Tablet (IMDUR) 30 milliGRAM(s) Oral daily  metoprolol succinate  milliGRAM(s) Oral daily  pantoprazole    Tablet 40 milliGRAM(s) Oral before breakfast    MEDICATIONS  (PRN):  acetaminophen     Tablet .. 650 milliGRAM(s) Oral every 6 hours PRN Temp greater or equal to 38C (100.4F), Mild Pain (1 - 3)  aluminum hydroxide/magnesium hydroxide/simethicone Suspension 30 milliLiter(s) Oral every 4 hours PRN Dyspepsia  melatonin 3 milliGRAM(s) Oral at bedtime PRN Insomnia  ondansetron Injectable 4 milliGRAM(s) IV Push every 8 hours PRN Nausea and/or Vomiting        I&O's Summary    25 Nov 2021 07:01  -  26 Nov 2021 07:00  --------------------------------------------------------  IN: 323 mL / OUT: 0 mL / NET: 323 mL    26 Nov 2021 07:01  -  26 Nov 2021 16:18  --------------------------------------------------------  IN: 0 mL / OUT: 0 mL / NET: 0 mL        PHYSICAL EXAM:  Vital Signs Last 24 Hrs  T(C): 36.4 (26 Nov 2021 14:54), Max: 37 (25 Nov 2021 17:36)  T(F): 97.6 (26 Nov 2021 14:54), Max: 98.6 (25 Nov 2021 17:36)  HR: 59 (26 Nov 2021 14:54) (59 - 60)  BP: 158/74 (26 Nov 2021 14:54) (105/50 - 171/77)  BP(mean): 77 (26 Nov 2021 11:55) (11 - 99)  RR: 16 (26 Nov 2021 14:54) (16 - 19)  SpO2: 96% (26 Nov 2021 14:54) (93% - 98%)      Constitutional: NAD, Resting  ENT: Supple, No JVD  Lungs: CTA B/L, Non-labored breathing  Cardio: RRR, S1/S2, No murmur  Abdomen: Soft, Nontender, Nondistended; Bowel sounds present  Extremities: No calf tenderness, No pitting edema  Musculoskeletal:   No clubbing or cyanosis of digits; no joint swelling or tenderness to palpation  Psych: Calm, cooperative affect appropriate  Neuro: Awake and alert  Skin: No rashes; no palpable lesions    LABS:                        13.7   11.56 )-----------( 269      ( 26 Nov 2021 06:58 )             41.5     11-26    133<L>  |  96<L>  |  28.7<H>  ----------------------------<  338<H>  4.1   |  22.0  |  1.11    Ca    9.8      26 Nov 2021 06:58  Mg     2.0     11-26    TPro  7.3  /  Alb  4.1  /  TBili  0.4  /  DBili  x   /  AST  26  /  ALT  14  /  AlkPhos  93  11-26    PT/INR - ( 25 Nov 2021 00:50 )   PT: 13.3 sec;   INR: 1.15 ratio         PTT - ( 26 Nov 2021 08:22 )  PTT:116.4 sec  CARDIAC MARKERS ( 26 Nov 2021 06:58 )  x     / 0.48 ng/mL / x     / x     / x      CARDIAC MARKERS ( 25 Nov 2021 22:51 )  x     / 0.55 ng/mL / x     / x     / x      CARDIAC MARKERS ( 25 Nov 2021 17:01 )  x     / 0.69 ng/mL / x     / x     / x      CARDIAC MARKERS ( 25 Nov 2021 11:54 )  x     / 0.78 ng/mL / x     / x     / x      CARDIAC MARKERS ( 24 Nov 2021 22:39 )  x     / 0.14 ng/mL / x     / x     / x      CARDIAC MARKERS ( 24 Nov 2021 19:18 )  x     / 0.05 ng/mL / x     / x     / x              CAPILLARY BLOOD GLUCOSE      POCT Blood Glucose.: 211 mg/dL (26 Nov 2021 13:54)  POCT Blood Glucose.: 208 mg/dL (26 Nov 2021 11:57)  POCT Blood Glucose.: 314 mg/dL (26 Nov 2021 08:32)  POCT Blood Glucose.: 357 mg/dL (26 Nov 2021 08:31)  POCT Blood Glucose.: 288 mg/dL (25 Nov 2021 20:56)  POCT Blood Glucose.: 155 mg/dL (25 Nov 2021 17:38)        RADIOLOGY REVIEWED

## 2021-11-26 NOTE — DISCHARGE NOTE PROVIDER - NSDCFUSCHEDAPPT_GEN_ALL_CORE_FT
SHIREEN CASH ; 12/08/2021 ; NPP Urogyn [address] [facility]  SHIREEN CASH ; 12/27/2021 ; NPP Cardio [address]

## 2021-11-27 ENCOUNTER — NON-APPOINTMENT (OUTPATIENT)
Age: 71
End: 2021-11-27

## 2021-11-27 LAB
ALBUMIN SERPL ELPH-MCNC: 4 G/DL — SIGNIFICANT CHANGE UP (ref 3.3–5.2)
ALP SERPL-CCNC: 71 U/L — SIGNIFICANT CHANGE UP (ref 40–120)
ALT FLD-CCNC: 14 U/L — SIGNIFICANT CHANGE UP
ANION GAP SERPL CALC-SCNC: 14 MMOL/L — SIGNIFICANT CHANGE UP (ref 5–17)
AST SERPL-CCNC: 18 U/L — SIGNIFICANT CHANGE UP
BASOPHILS # BLD AUTO: 0.07 K/UL — SIGNIFICANT CHANGE UP (ref 0–0.2)
BASOPHILS NFR BLD AUTO: 0.6 % — SIGNIFICANT CHANGE UP (ref 0–2)
BILIRUB SERPL-MCNC: 0.5 MG/DL — SIGNIFICANT CHANGE UP (ref 0.4–2)
BUN SERPL-MCNC: 30.9 MG/DL — HIGH (ref 8–20)
CALCIUM SERPL-MCNC: 9.4 MG/DL — SIGNIFICANT CHANGE UP (ref 8.6–10.2)
CHLORIDE SERPL-SCNC: 96 MMOL/L — LOW (ref 98–107)
CO2 SERPL-SCNC: 24 MMOL/L — SIGNIFICANT CHANGE UP (ref 22–29)
CREAT SERPL-MCNC: 1.2 MG/DL — SIGNIFICANT CHANGE UP (ref 0.5–1.3)
EOSINOPHIL # BLD AUTO: 0.27 K/UL — SIGNIFICANT CHANGE UP (ref 0–0.5)
EOSINOPHIL NFR BLD AUTO: 2.2 % — SIGNIFICANT CHANGE UP (ref 0–6)
GLUCOSE BLDC GLUCOMTR-MCNC: 133 MG/DL — HIGH (ref 70–99)
GLUCOSE BLDC GLUCOMTR-MCNC: 169 MG/DL — HIGH (ref 70–99)
GLUCOSE BLDC GLUCOMTR-MCNC: 215 MG/DL — HIGH (ref 70–99)
GLUCOSE BLDC GLUCOMTR-MCNC: 280 MG/DL — HIGH (ref 70–99)
GLUCOSE SERPL-MCNC: 247 MG/DL — HIGH (ref 70–99)
HCT VFR BLD CALC: 39.3 % — SIGNIFICANT CHANGE UP (ref 34.5–45)
HGB BLD-MCNC: 12.9 G/DL — SIGNIFICANT CHANGE UP (ref 11.5–15.5)
IMM GRANULOCYTES NFR BLD AUTO: 0.7 % — SIGNIFICANT CHANGE UP (ref 0–1.5)
LYMPHOCYTES # BLD AUTO: 33.8 % — SIGNIFICANT CHANGE UP (ref 13–44)
LYMPHOCYTES # BLD AUTO: 4.08 K/UL — HIGH (ref 1–3.3)
MCHC RBC-ENTMCNC: 27.7 PG — SIGNIFICANT CHANGE UP (ref 27–34)
MCHC RBC-ENTMCNC: 32.8 GM/DL — SIGNIFICANT CHANGE UP (ref 32–36)
MCV RBC AUTO: 84.3 FL — SIGNIFICANT CHANGE UP (ref 80–100)
MONOCYTES # BLD AUTO: 0.99 K/UL — HIGH (ref 0–0.9)
MONOCYTES NFR BLD AUTO: 8.2 % — SIGNIFICANT CHANGE UP (ref 2–14)
NEUTROPHILS # BLD AUTO: 6.58 K/UL — SIGNIFICANT CHANGE UP (ref 1.8–7.4)
NEUTROPHILS NFR BLD AUTO: 54.5 % — SIGNIFICANT CHANGE UP (ref 43–77)
PLATELET # BLD AUTO: 319 K/UL — SIGNIFICANT CHANGE UP (ref 150–400)
POTASSIUM SERPL-MCNC: 4.5 MMOL/L — SIGNIFICANT CHANGE UP (ref 3.5–5.3)
POTASSIUM SERPL-SCNC: 4.5 MMOL/L — SIGNIFICANT CHANGE UP (ref 3.5–5.3)
PROT SERPL-MCNC: 7.2 G/DL — SIGNIFICANT CHANGE UP (ref 6.6–8.7)
RBC # BLD: 4.66 M/UL — SIGNIFICANT CHANGE UP (ref 3.8–5.2)
RBC # FLD: 13.2 % — SIGNIFICANT CHANGE UP (ref 10.3–14.5)
SODIUM SERPL-SCNC: 134 MMOL/L — LOW (ref 135–145)
WBC # BLD: 12.07 K/UL — HIGH (ref 3.8–10.5)
WBC # FLD AUTO: 12.07 K/UL — HIGH (ref 3.8–10.5)

## 2021-11-27 PROCEDURE — 93279 PRGRMG DEV EVAL PM/LDLS PM: CPT | Mod: 26

## 2021-11-27 PROCEDURE — 99233 SBSQ HOSP IP/OBS HIGH 50: CPT

## 2021-11-27 PROCEDURE — 93010 ELECTROCARDIOGRAM REPORT: CPT

## 2021-11-27 PROCEDURE — 99232 SBSQ HOSP IP/OBS MODERATE 35: CPT

## 2021-11-27 RX ORDER — RANOLAZINE 500 MG/1
1 TABLET, FILM COATED, EXTENDED RELEASE ORAL
Qty: 180 | Refills: 0
Start: 2021-11-27 | End: 2022-02-24

## 2021-11-27 RX ORDER — INSULIN GLARGINE 100 [IU]/ML
15 INJECTION, SOLUTION SUBCUTANEOUS AT BEDTIME
Refills: 0 | Status: DISCONTINUED | OUTPATIENT
Start: 2021-11-27 | End: 2021-11-28

## 2021-11-27 RX ORDER — ATORVASTATIN CALCIUM 80 MG/1
1 TABLET, FILM COATED ORAL
Qty: 90 | Refills: 0
Start: 2021-11-27 | End: 2022-02-24

## 2021-11-27 RX ORDER — INSULIN LISPRO 100/ML
5 VIAL (ML) SUBCUTANEOUS
Refills: 0 | Status: DISCONTINUED | OUTPATIENT
Start: 2021-11-27 | End: 2021-11-28

## 2021-11-27 RX ORDER — ISOSORBIDE MONONITRATE 60 MG/1
1 TABLET, EXTENDED RELEASE ORAL
Qty: 90 | Refills: 0
Start: 2021-11-27 | End: 2022-02-24

## 2021-11-27 RX ORDER — CLOPIDOGREL BISULFATE 75 MG/1
1 TABLET, FILM COATED ORAL
Qty: 90 | Refills: 1
Start: 2021-11-27 | End: 2022-05-25

## 2021-11-27 RX ORDER — METOPROLOL TARTRATE 50 MG
1 TABLET ORAL
Qty: 90 | Refills: 0
Start: 2021-11-27 | End: 2022-02-24

## 2021-11-27 RX ADMIN — ATORVASTATIN CALCIUM 80 MILLIGRAM(S): 80 TABLET, FILM COATED ORAL at 21:57

## 2021-11-27 RX ADMIN — Medication 6: at 09:17

## 2021-11-27 RX ADMIN — ISOSORBIDE MONONITRATE 30 MILLIGRAM(S): 60 TABLET, EXTENDED RELEASE ORAL at 13:19

## 2021-11-27 RX ADMIN — PANTOPRAZOLE SODIUM 40 MILLIGRAM(S): 20 TABLET, DELAYED RELEASE ORAL at 05:04

## 2021-11-27 RX ADMIN — CLOPIDOGREL BISULFATE 75 MILLIGRAM(S): 75 TABLET, FILM COATED ORAL at 13:20

## 2021-11-27 RX ADMIN — RANOLAZINE 500 MILLIGRAM(S): 500 TABLET, FILM COATED, EXTENDED RELEASE ORAL at 05:04

## 2021-11-27 RX ADMIN — Medication 5 UNIT(S): at 17:54

## 2021-11-27 RX ADMIN — INSULIN GLARGINE 15 UNIT(S): 100 INJECTION, SOLUTION SUBCUTANEOUS at 21:56

## 2021-11-27 RX ADMIN — GABAPENTIN 300 MILLIGRAM(S): 400 CAPSULE ORAL at 13:56

## 2021-11-27 RX ADMIN — Medication 2: at 21:57

## 2021-11-27 RX ADMIN — Medication 100 MILLIGRAM(S): at 05:04

## 2021-11-27 RX ADMIN — Medication 4: at 13:18

## 2021-11-27 RX ADMIN — RANOLAZINE 500 MILLIGRAM(S): 500 TABLET, FILM COATED, EXTENDED RELEASE ORAL at 18:55

## 2021-11-27 RX ADMIN — GABAPENTIN 300 MILLIGRAM(S): 400 CAPSULE ORAL at 05:05

## 2021-11-27 RX ADMIN — ENOXAPARIN SODIUM 65 MILLIGRAM(S): 100 INJECTION SUBCUTANEOUS at 18:52

## 2021-11-27 RX ADMIN — GABAPENTIN 300 MILLIGRAM(S): 400 CAPSULE ORAL at 21:57

## 2021-11-27 RX ADMIN — ENOXAPARIN SODIUM 65 MILLIGRAM(S): 100 INJECTION SUBCUTANEOUS at 05:04

## 2021-11-27 RX ADMIN — Medication 81 MILLIGRAM(S): at 13:19

## 2021-11-27 RX ADMIN — Medication 4 UNIT(S): at 09:16

## 2021-11-27 RX ADMIN — Medication 5 UNIT(S): at 13:18

## 2021-11-27 NOTE — PROGRESS NOTE ADULT - ATTENDING COMMENTS
Pt seen and examined. Pt with TVD, distal disease which as per Dr Vivar is managed medically.   She is on ranexa, imdur, BB. No more cp  LVEF is preserved. on DAPT.  can DC home, if no CP or worsening SOB  Pt advised to come back to ed if has CP by calling 911  FU Dr Allen in office   Thank you.  Please call if needed.
Patient was seen post [facility], bed 11 in recovery, SCOOTER Golden translated for me.  Patient was explained that she had NSTEMI, Cath showed 2 Vessel CAD which are not amenable for PCI.  Patient to be managed medically.  In addition to DAPT/Ranexa/Imdur/Statin, She will be on Toprol  mg daily.  Patient was on Amlodopine prior to admission which should be discontinued in order facilitate maimize her BB therapy.  Patient is advised to f/u with me in the office in 2-3 weeks.  Patient to be on Lovenox until sunday for NSTEMI management.  Patient to be discharged home on Sunday evening.    Will consider adding ACEI as outpatient if BP permits.    Discussed with Dr Jesse David
Pt is 70 y/o female with medical history of HTN, DM, CVA (2019) with Right sided deficits, CHB s/p Micra PPM, who presents to [facility]-ED with chest pain.    Chest Pain  Patient has positive troponins.  For C in AM.    Discussed with  and son.    < from: TTE Echo Complete w/o Contrast w/ Doppler (11.25.21 @ 12:40) >    Summary:   1. Left ventricular ejection fraction, by visual estimation,is 55 to 60%.   2. Normal global left ventricular systolic function.   3. Normal left ventricular internal cavity size.   4. Normal right ventricular size and function.   5. The left atrium is normal in size.   6. Mild mitral annular calcification.  7. Mild thickening and calcification of the anterior and posterior mitral valve leaflets.   8. Trace mitral valve regurgitation.   9. Sclerotic aortic valve with normal opening.  10. There is no evidence of pericardial effusion.    MD LanceElectronically signed on 11/25/2021 at 1:59:26 PM      < end of copied text >

## 2021-11-27 NOTE — PROCEDURE NOTE - ADDITIONAL PROCEDURE DETAILS
Micra AV implanted by Dr. Montgomery 4/2020 when patient presented with COVID-19 infection and second degree AVB. No routine EP follow ups.   She is now in SR with CHB, and found to be programmed in VVI  60   Battery > 8 years    Changed to VDD, made some adjustments to optimize AV synchronization, may need further adjustment as an outpatient  Emphasized the importance of EP follow ups  Does not have home monitoring

## 2021-11-27 NOTE — PROGRESS NOTE ADULT - SUBJECTIVE AND OBJECTIVE BOX
[place] [facility]                                                               Office: [address] [address]                                                              Telephone: [phone]. Fax:[phone]                                                                             PROGRESS NOTE  Reason for follow up: NSTEMI  Overnight: No new events.     HPI: 72 y/o female with medical history of HTN, DM, CVA (2019) with Right sided deficits, CHB s/p Micra PPM, who presents to [facility]-ED with chest pain. Pt states that when she was standing up not exerting herself at 1pm, she felt midsternal sharp chest pain that radiated to right shoulder and back which lasted hours. Pt states that chest pain worse with cough which she has had for past week. Pt states that she had similar chest pain when she needed a PPM. Pt states that she  In ED, Trop#1-negative, BNP-339, ECG-V-paced HR @ 60 underlying CHB.  (HPI noted)      Update: Patient now status post left heart catheterization which showed diffuse multivessel disease RCA, LAD, OM. Hemostasis achieved with right radial band. Heparin 2000 units given in cath lab. Recommendations: Start Plavix; 300 mg x1 now and then continue with Plavix 75 mg daily on 11/27/21, start Imdur 30 mg daily, will start Ranexa 500 mg BID starting 11/27/21. Continue with Atorvastatin 80 mg daily and Aspirin 81 mg daily. Will maximize patient with medical management for now. If symptoms persists, High Risk PCI likely to be considered. (Pharmacy verified as [facility] on [address] [place] [address] and [address] [phone])    Patient denied any CB or SOB overnight. With  IPAD, new medications were reviewed and correct Pharmacy was identified. New and revised prescriptions for Plavix, Ranexa, Imdur, Metoprolol, and Lipitor sent to [facility])  EKG and telemetry reviewed Patient in a paced rhythm @ 60 BPM with underlying CHB and LBBB.   	  Vitals:  T(C): 36.4 (11-27-21 @ 10:14), Max: 37 (11-26-21 @ 16:23)  HR: 66 (11-27-21 @ 10:14) (59 - 66)  BP: 143/- (11-27-21 @ 10:14) (107/57 - 171/77)  RR: 19 (11-27-21 @ 10:14) (16 - 19)  SpO2: 95% (11-27-21 @ 10:14) (95% - 97%)    I&O's Summary    26 Nov 2021 07:01  -  27 Nov 2021 07:00  --------------------------------------------------------  IN: 1000 mL / OUT: 0 mL / NET: 1000 mL      Weight (kg): 65.9 (11-25 @ 02:22)      PHYSICAL EXAM:  Appearance: Comfortable. No acute distress  HEENT:  Head and neck: Atraumatic. Normocephalic.  Normal oral mucosa, PERRL, Neck is supple.   Neurologic: A & O x 3, no focal deficits. EOMI.  Cardiovascular: Normal S1 S2, No murmur, rubs/gallops. No edema  Respiratory: Lungs clear to auscultation  Gastrointestinal:  Soft, Non-tender, + BS  Extremities: No edema; R radial site without s/s bleeding or hematoma. Dressing to RRA removed and site left PRIYA.  Psychiatry: Patient is calm. No agitation. Mood & affect appropriate      CURRENT MEDICATIONS:  Clopidogrel 75 mg po daily  isosorbide   mononitrate ER Tablet (IMDUR) 30 milliGRAM(s) Oral daily  metoprolol succinate  milliGRAM(s) Oral daily  ranolazine 500 milliGRAM(s) Oral two times a day  Atorvastatin 80 mg po daily  Aspirin 81 mg po daily  Enoxaparin 65 mg SQ BID  Metoprolol succinate  mg po daily    gabapentin  pantoprazole    Tablet  atorvastatin  dextrose 40% Gel  dextrose 50% Injectable  glucagon  Injectable  insulin glargine Injectable (LANTUS)  insulin lispro (ADMELOG) corrective regimen sliding scale  insulin lispro Injectable (ADMELOG)  insulin lispro Injectable (ADMELOG)  insulin lispro Injectable (ADMELOG)  aspirin  chewable  clopidogrel Tablet  dextrose 5%.  enoxaparin Injectable      DIAGNOSTIC TESTING:  < from: TTE Echo Complete w/o Contrast w/ Doppler (11.25.21 @ 12:40) >  Summary:   1. Left ventricular ejection fraction, by visual estimation,is 55 to 60%.   2. Normal global left ventricular systolic function.   3. Normal left ventricular internal cavity size.   4. Normal right ventricular size and function.   5. The left atrium is normal in size.   6. Mild mitral annular calcification.  7. Mild thickening and calcification of the anterior and posterior mitral valve leaflets.   8. Trace mitral valve regurgitation.   9. Sclerotic aortic valve with normal opening.  10. There is no evidence of pericardial effusion.    MD LanceElectronically signed on 11/25/2021 at 1:59:26 PM    < end of copied text >          LABS:	 	  CARDIAC MARKERS ( 26 Nov 2021 06:58 )  x     / 0.48 ng/mL / x     / x     / x      p-BNP 26 Nov 2021 06:58: x    , CARDIAC MARKERS ( 25 Nov 2021 22:51 )  x     / 0.55 ng/mL / x     / x     / x      p-BNP 25 Nov 2021 22:51: x    , CARDIAC MARKERS ( 25 Nov 2021 17:01 )  x     / 0.69 ng/mL / x     / x     / x      p-BNP 25 Nov 2021 17:01: x    , CARDIAC MARKERS ( 25 Nov 2021 11:54 )  x     / 0.78 ng/mL / x     / x     / x      p-BNP 25 Nov 2021 11:54: x    , CARDIAC MARKERS ( 24 Nov 2021 22:39 )  x     / 0.14 ng/mL / x     / x     / x      p-BNP 24 Nov 2021 22:39: x    , CARDIAC MARKERS ( 24 Nov 2021 19:18 )  x     / 0.05 ng/mL / x     / x     / x      p-BNP 24 Nov 2021 19:18: x    , CARDIAC MARKERS ( 24 Nov 2021 14:57 )  x     / <0.01 ng/mL / x     / x     / x      p-BNP 24 Nov 2021 14:57: 339 pg/mL                          12.9   12.07 )-----------( 319      ( 27 Nov 2021 08:40 )             39.3     11-27    134<L>  |  96<L>  |  30.9<H>  ----------------------------<  247<H>  4.5   |  24.0  |  1.20    Ca    9.4      27 Nov 2021 08:40  Mg     2.0     11-26    TPro  7.2  /  Alb  4.0  /  TBili  0.5  /  DBili  x   /  AST  18  /  ALT  14  /  AlkPhos  71  11-27    proBNP: Serum Pro-Brain Natriuretic Peptide: 339 pg/mL (11-24 @ 14:57)    Lipid Profile: Date: 11-26 @ 06:58  Total cholesterol 160; Direct LDL: --; HDL: 41; Triglycerides:224    HgA1c:   TSH: Thyroid Stimulating Hormone, Serum: 1.14 uIU/mL        TELEMETRY: Reviewed  No events overnight  ECG:  Reviewed by me. 	100% V paced rhythm with underlying CHB and LBBB. Old anteroseptal MI.      ASSESSMENT and PLAN    72 y/o female with medical history of HLD,  HTN, DM, CVA (2019) with Right sided deficits, CHB s/p Micra PPM, who presents to [facility]-ED with chest pain. Initially trop negative then started trending up, in addition was admitted as NSTEMI. Echo done with normal LV systolic function, EF 55-60%. She now is scheduled for C to evaluate coronary anatomy. Patient now status post C which showed multivessel disease.     Formal cath report pending  -s/p LHC with diffuse distal multivessel disease, medical management recommended   -Access site precautions, right radial access  -RRA with palpable pulses, no ecchymosis or hematoma, no tingling or numbness; dressing off and site PRIYA  -has palpable pulses   -Labs and EKG in am, no need to continue to trend troponin  -Repeat ECG if any clinical indication or change on tele  -Continue with Lovenox full dose (65 mg) for 2 days   -Start medical management of multivessel disease with DAPT with Plavix and Aspirin  -Plavix 300 mg x1 now; then continue with Plavix 75 mg daily starting 11/27/21  -start Imdur 30 mg daily today  -start Ranexa 500 mg twice a day starting 11/27/21  -Continue current medical therapy; high dose statin, start Toprol  mg daily staring today   -Educated regarding strict adherence with DAPT   -Educated regarding post procedure management and care  -Discussed the importance of risk factors modification  -patient not a candidate for cardiac rehab secondary to:  no PCI with LHC; found with multivessel disease for which medical management is recommended   -if pt stable will likely d/c on Sunday       Assessment and recommendations are final when note is signed by the attending.                                                                                    [place] [facility]                                                               Office: [address] [address]                                                              Telephone: [phone]. Fax:[phone]                                                                             PROGRESS NOTE  Reason for follow up: NSTEMI  Overnight: No new events.     HPI: 70 y/o female with medical history of HTN, DM, CVA (2019) with Right sided deficits, CHB s/p Micra PPM, who presents to [facility]-ED with chest pain. Pt states that when she was standing up not exerting herself at 1pm, she felt midsternal sharp chest pain that radiated to right shoulder and back which lasted hours. Pt states that chest pain worse with cough which she has had for past week. Pt states that she had similar chest pain when she needed a PPM. Pt states that she  In ED, Trop#1-negative, BNP-339, ECG-V-paced HR @ 60 underlying CHB.  (HPI noted)      Update: Patient now status post left heart catheterization which showed diffuse multivessel disease RCA, LAD, OM. Hemostasis achieved with right radial band. Heparin 2000 units given in cath lab. Recommendations: Start Plavix; 300 mg x1 now and then continue with Plavix 75 mg daily on 11/27/21, start Imdur 30 mg daily, will start Ranexa 500 mg BID starting 11/27/21. Continue with Atorvastatin 80 mg daily and Aspirin 81 mg daily. Will maximize patient with medical management for now. If symptoms persists, High Risk PCI likely to be considered. (Pharmacy verified as [facility] on [address] [place] [address] and [address] [phone])    Patient denied any CB or SOB overnight. With  IPAD, new medications were reviewed and correct Pharmacy was identified. New and revised prescriptions for Plavix, Ranexa, Imdur, Metoprolol, and Lipitor sent to [facility])  EKG and telemetry reviewed Patient in a paced rhythm @ 60 BPM with underlying CHB and LBBB.   	  Vitals:  T(C): 36.4 (11-27-21 @ 10:14), Max: 37 (11-26-21 @ 16:23)  HR: 66 (11-27-21 @ 10:14) (59 - 66)  BP: 143/- (11-27-21 @ 10:14) (107/57 - 171/77)  RR: 19 (11-27-21 @ 10:14) (16 - 19)  SpO2: 95% (11-27-21 @ 10:14) (95% - 97%)    I&O's Summary    26 Nov 2021 07:01  -  27 Nov 2021 07:00  --------------------------------------------------------  IN: 1000 mL / OUT: 0 mL / NET: 1000 mL      Weight (kg): 65.9 (11-25 @ 02:22)      PHYSICAL EXAM:  Appearance: Comfortable. No acute distress  HEENT:  Head and neck: Atraumatic. Normocephalic.  Normal oral mucosa, PERRL, Neck is supple.   Neurologic: A & O x 3, no focal deficits. EOMI.  Cardiovascular: Normal S1 S2, No murmur, rubs/gallops. No edema  Respiratory: Lungs clear to auscultation  Gastrointestinal:  Soft, Non-tender, + BS  Extremities: No edema; R radial site without s/s bleeding or hematoma. Dressing to RRA removed and site left PRIYA.  Psychiatry: Patient is calm. No agitation. Mood & affect appropriate      CURRENT MEDICATIONS:  Clopidogrel 75 mg po daily  isosorbide   mononitrate ER Tablet (IMDUR) 30 milliGRAM(s) Oral daily  metoprolol succinate  milliGRAM(s) Oral daily  ranolazine 500 milliGRAM(s) Oral two times a day  Atorvastatin 80 mg po daily  Aspirin 81 mg po daily  Enoxaparin 65 mg SQ BID  Metoprolol succinate  mg po daily    gabapentin  pantoprazole    Tablet  atorvastatin  dextrose 40% Gel  dextrose 50% Injectable  glucagon  Injectable  insulin glargine Injectable (LANTUS)  insulin lispro (ADMELOG) corrective regimen sliding scale  insulin lispro Injectable (ADMELOG)  insulin lispro Injectable (ADMELOG)  insulin lispro Injectable (ADMELOG)  aspirin  chewable  clopidogrel Tablet  dextrose 5%.  enoxaparin Injectable      DIAGNOSTIC TESTING:  < from: TTE Echo Complete w/o Contrast w/ Doppler (11.25.21 @ 12:40) >  Summary:   1. Left ventricular ejection fraction, by visual estimation,is 55 to 60%.   2. Normal global left ventricular systolic function.   3. Normal left ventricular internal cavity size.   4. Normal right ventricular size and function.   5. The left atrium is normal in size.   6. Mild mitral annular calcification.  7. Mild thickening and calcification of the anterior and posterior mitral valve leaflets.   8. Trace mitral valve regurgitation.   9. Sclerotic aortic valve with normal opening.  10. There is no evidence of pericardial effusion.    MD LanceElectronically signed on 11/25/2021 at 1:59:26 PM    < end of copied text >          LABS:	 	  CARDIAC MARKERS ( 26 Nov 2021 06:58 )  x     / 0.48 ng/mL / x     / x     / x      p-BNP 26 Nov 2021 06:58: x    , CARDIAC MARKERS ( 25 Nov 2021 22:51 )  x     / 0.55 ng/mL / x     / x     / x      p-BNP 25 Nov 2021 22:51: x    , CARDIAC MARKERS ( 25 Nov 2021 17:01 )  x     / 0.69 ng/mL / x     / x     / x      p-BNP 25 Nov 2021 17:01: x    , CARDIAC MARKERS ( 25 Nov 2021 11:54 )  x     / 0.78 ng/mL / x     / x     / x      p-BNP 25 Nov 2021 11:54: x    , CARDIAC MARKERS ( 24 Nov 2021 22:39 )  x     / 0.14 ng/mL / x     / x     / x      p-BNP 24 Nov 2021 22:39: x    , CARDIAC MARKERS ( 24 Nov 2021 19:18 )  x     / 0.05 ng/mL / x     / x     / x      p-BNP 24 Nov 2021 19:18: x    , CARDIAC MARKERS ( 24 Nov 2021 14:57 )  x     / <0.01 ng/mL / x     / x     / x      p-BNP 24 Nov 2021 14:57: 339 pg/mL                          12.9   12.07 )-----------( 319      ( 27 Nov 2021 08:40 )             39.3     11-27    134<L>  |  96<L>  |  30.9<H>  ----------------------------<  247<H>  4.5   |  24.0  |  1.20    Ca    9.4      27 Nov 2021 08:40  Mg     2.0     11-26    TPro  7.2  /  Alb  4.0  /  TBili  0.5  /  DBili  x   /  AST  18  /  ALT  14  /  AlkPhos  71  11-27    proBNP: Serum Pro-Brain Natriuretic Peptide: 339 pg/mL (11-24 @ 14:57)    Lipid Profile: Date: 11-26 @ 06:58  Total cholesterol 160; Direct LDL: --; HDL: 41; Triglycerides:224    HgA1c:   TSH: Thyroid Stimulating Hormone, Serum: 1.14 uIU/mL        TELEMETRY: Reviewed  No events overnight  ECG:  Reviewed by me. 	100% V paced rhythm with underlying CHB and LBBB. Old anteroseptal MI.      ASSESSMENT and PLAN    70 y/o female with medical history of HLD,  HTN, DM, CVA (2019) with Right sided deficits, CHB s/p Micra PPM, who presents to [facility]-ED with chest pain. Initially trop negative then started trending up, in addition was admitted as NSTEMI. Echo done with normal LV systolic function, EF 55-60%. She now is scheduled for [facility] to evaluate coronary anatomy. Patient now status post [facility] which showed multivessel disease. EKG reviewed - patient is in a 100% V paced rhythm at 60 BPM with underlying CHB and LBBB.    Formal cath report pending  -s/p C with diffuse distal multivessel disease, medical management recommended   -Access site precautions, right radial access  -RRA with palpable pulses, no ecchymosis or hematoma, no tingling or numbness; dressing off and site PRIYA  -has palpable pulses   -Labs and EKG in am, no need to continue to trend troponin  -Repeat ECG if any clinical indication or change on tele  -Continue with Lovenox full dose (65 mg) for 2 days   -Start medical management of multivessel disease with DAPT with Plavix and Aspirin  -Plavix 300 mg x1 now; then continue with Plavix 75 mg daily starting 11/27/21  -start Imdur 30 mg daily today  -start Ranexa 500 mg twice a day starting 11/27/21  -Continue current medical therapy; high dose statin, start Toprol  mg daily staring today   -Educated regarding strict adherence with DAPT   -Educated regarding post procedure management and care  -Discussed the importance of risk factors modification  -patient not a candidate for cardiac rehab secondary to:  no PCI with LHC; found with multivessel disease for which medical management is recommended   -if pt stable will likely d/c on Sunday       Assessment and recommendations are final when note is signed by the attending.

## 2021-11-27 NOTE — PROGRESS NOTE ADULT - SUBJECTIVE AND OBJECTIVE BOX
[facility] Division of Hospital Medicine    Chief Complaint:  NSTEMI    SUBJECTIVE / OVERNIGHT EVENTS: Patient seen and examined. Denies chest pain, palpitations and shortness of breath. States she is tired. EKG this AM with heart block, cards and EP notified.     Patient denies chest pain, SOB, abd pain, N/V, fever, chills, dysuria or any other complaints. All remainder ROS negative.     MEDICATIONS  (STANDING):  aspirin  chewable 81 milliGRAM(s) Oral daily  atorvastatin 80 milliGRAM(s) Oral at bedtime  clopidogrel Tablet 75 milliGRAM(s) Oral daily  dextrose 40% Gel 15 Gram(s) Oral once  dextrose 5%. 1000 milliLiter(s) (50 mL/Hr) IV Continuous <Continuous>  dextrose 50% Injectable 25 Gram(s) IV Push once  enoxaparin Injectable 65 milliGRAM(s) SubCutaneous two times a day  gabapentin 300 milliGRAM(s) Oral three times a day  glucagon  Injectable 1 milliGRAM(s) IntraMuscular once  insulin glargine Injectable (LANTUS) 10 Unit(s) SubCutaneous at bedtime  insulin lispro (ADMELOG) corrective regimen sliding scale   SubCutaneous Before meals and at bedtime  insulin lispro Injectable (ADMELOG) 4 Unit(s) SubCutaneous before breakfast  insulin lispro Injectable (ADMELOG) 4 Unit(s) SubCutaneous before lunch  insulin lispro Injectable (ADMELOG) 4 Unit(s) SubCutaneous before dinner  isosorbide   mononitrate ER Tablet (IMDUR) 30 milliGRAM(s) Oral daily  metoprolol succinate  milliGRAM(s) Oral daily  pantoprazole    Tablet 40 milliGRAM(s) Oral before breakfast  ranolazine 500 milliGRAM(s) Oral two times a day    MEDICATIONS  (PRN):  acetaminophen     Tablet .. 650 milliGRAM(s) Oral every 6 hours PRN Temp greater or equal to 38C (100.4F), Mild Pain (1 - 3)  aluminum hydroxide/magnesium hydroxide/simethicone Suspension 30 milliLiter(s) Oral every 4 hours PRN Dyspepsia  melatonin 3 milliGRAM(s) Oral at bedtime PRN Insomnia  ondansetron Injectable 4 milliGRAM(s) IV Push every 8 hours PRN Nausea and/or Vomiting        I&O's Summary    26 Nov 2021 07:01  -  27 Nov 2021 07:00  --------------------------------------------------------  IN: 1000 mL / OUT: 0 mL / NET: 1000 mL        PHYSICAL EXAM:  Vital Signs Last 24 Hrs  T(C): 36.4 (27 Nov 2021 10:14), Max: 37 (26 Nov 2021 16:23)  T(F): 97.5 (27 Nov 2021 10:14), Max: 98.6 (26 Nov 2021 16:23)  HR: 66 (27 Nov 2021 10:14) (59 - 66)  BP: 143/- (27 Nov 2021 10:14) (107/57 - 171/77)  BP(mean): 77 (26 Nov 2021 11:55) (11 - 87)  RR: 19 (27 Nov 2021 10:14) (16 - 19)  SpO2: 95% (27 Nov 2021 10:14) (95% - 97%)        CONSTITUTIONAL: NAD  ENMT: Moist oral mucosa, no pharyngeal injection or exudates  RESPIRATORY: Normal respiratory effort; lungs are clear to auscultation bilaterally  CARDIOVASCULAR: Regular rate and rhythm, normal S1 and S2, No lower extremity edema;  ABDOMEN: Nontender to palpation, normoactive bowel sounds, no rebound/guarding  MUSCLOSKELETAL:  no clubbing or cyanosis of digits; no joint swelling or tenderness to palpation  PSYCH: A+O to person, place, and time; affect appropriate  NEUROLOGY: CN 2-12 are intact and symmetric; no gross sensory deficits;   SKIN: No rashes; no palpable lesions    LABS:                        12.9   12.07 )-----------( 319      ( 27 Nov 2021 08:40 )             39.3     11-27    134<L>  |  96<L>  |  30.9<H>  ----------------------------<  247<H>  4.5   |  24.0  |  1.20    Ca    9.4      27 Nov 2021 08:40  Mg     2.0     11-26    TPro  7.2  /  Alb  4.0  /  TBili  0.5  /  DBili  x   /  AST  18  /  ALT  14  /  AlkPhos  71  11-27    PTT - ( 26 Nov 2021 08:22 )  PTT:116.4 sec  CARDIAC MARKERS ( 26 Nov 2021 06:58 )  x     / 0.48 ng/mL / x     / x     / x      CARDIAC MARKERS ( 25 Nov 2021 22:51 )  x     / 0.55 ng/mL / x     / x     / x      CARDIAC MARKERS ( 25 Nov 2021 17:01 )  x     / 0.69 ng/mL / x     / x     / x      CARDIAC MARKERS ( 25 Nov 2021 11:54 )  x     / 0.78 ng/mL / x     / x     / x              CAPILLARY BLOOD GLUCOSE      POCT Blood Glucose.: 280 mg/dL (27 Nov 2021 09:14)  POCT Blood Glucose.: 217 mg/dL (26 Nov 2021 21:31)  POCT Blood Glucose.: 364 mg/dL (26 Nov 2021 19:08)  POCT Blood Glucose.: 211 mg/dL (26 Nov 2021 13:54)  POCT Blood Glucose.: 208 mg/dL (26 Nov 2021 11:57)        RADIOLOGY & ADDITIONAL TESTS:  Results Reviewed:   Imaging Personally Reviewed:  Electrocardiogram Personally Reviewed:

## 2021-11-27 NOTE — PROGRESS NOTE ADULT - ASSESSMENT
70 y/o female with NSTEMI, hx of CVA, DM-2, HTN, HLD, Neuropathy, GERD    Possible heart block  noted on EKG  patient is asymptomatic  - Cards and EP notified  - rhythm strip requested  - monitor on tele    NSTEMI s/p Cath   -S/P Cath with diffuse multivessel disease RCA, LAD, OM  -hep gtt transitioned to Lovenox until Sunday Morning.   -Cont. Aspirin, high intensity statin, BB  -continue plavix  - continue imdur, Ranexa  -TTE noted  -PRN nitro  -Cardiology recs appreciated  -OOB, ambulate, fall risk  -Cardiac diet    CVA:  -improved right residual deficits from 4/20  -no falls at home, no dysphagia  -cont. o/p regimen    DM-2:  -Hold Metformin   -Lantus, mealtime and coverage insulin  -ADA diet  -A1c noted    HTN:  -Hold norvasc with increased BB - Will not go home with it  -DASH diet    Neuropathy:  -Cont. Neurontin    GERD:  -PPI      Dispo: DC pending EP veronica     72 y/o female with NSTEMI, hx of CVA, DM-2, HTN, HLD, Neuropathy, GERD    Possible heart block  noted on EKG  per chart review patient has hx of CHB s/p micra PPM  patient is asymptomatic  - Cards and EP notified, device needs to be checked.   - rhythm strip requested  - monitor on tele    NSTEMI s/p Cath   -S/P Cath with diffuse multivessel disease RCA, LAD, OM  -hep gtt transitioned to Lovenox until Sunday Morning.   -Cont. Aspirin, high intensity statin, BB  -continue plavix  - continue imdur, Ranexa  -TTE noted  -PRN nitro  -Cardiology recs appreciated  -OOB, ambulate, fall risk  -Cardiac diet    CVA:  -improved right residual deficits from 4/20  -no falls at home, no dysphagia  -cont. o/p regimen    DM-2:  -Hold Metformin   -Lantus, mealtime and coverage insulin  -ADA diet  -A1c noted    HTN:  -Hold norvasc with increased BB - Will not go home with it  -DASH diet    Neuropathy:  -Cont. Neurontin    GERD:  -PPI      Dispo: DC pending EP veronica

## 2021-11-28 ENCOUNTER — TRANSCRIPTION ENCOUNTER (OUTPATIENT)
Age: 71
End: 2021-11-28

## 2021-11-28 VITALS
DIASTOLIC BLOOD PRESSURE: 77 MMHG | HEART RATE: 64 BPM | RESPIRATION RATE: 18 BRPM | OXYGEN SATURATION: 99 % | SYSTOLIC BLOOD PRESSURE: 132 MMHG | TEMPERATURE: 98 F

## 2021-11-28 LAB
ANION GAP SERPL CALC-SCNC: 14 MMOL/L — SIGNIFICANT CHANGE UP (ref 5–17)
BUN SERPL-MCNC: 29.6 MG/DL — HIGH (ref 8–20)
CALCIUM SERPL-MCNC: 9.4 MG/DL — SIGNIFICANT CHANGE UP (ref 8.6–10.2)
CHLORIDE SERPL-SCNC: 100 MMOL/L — SIGNIFICANT CHANGE UP (ref 98–107)
CO2 SERPL-SCNC: 24 MMOL/L — SIGNIFICANT CHANGE UP (ref 22–29)
CREAT SERPL-MCNC: 1.09 MG/DL — SIGNIFICANT CHANGE UP (ref 0.5–1.3)
GLUCOSE BLDC GLUCOMTR-MCNC: 252 MG/DL — HIGH (ref 70–99)
GLUCOSE BLDC GLUCOMTR-MCNC: 259 MG/DL — HIGH (ref 70–99)
GLUCOSE SERPL-MCNC: 261 MG/DL — HIGH (ref 70–99)
HCT VFR BLD CALC: 37.3 % — SIGNIFICANT CHANGE UP (ref 34.5–45)
HGB BLD-MCNC: 12.1 G/DL — SIGNIFICANT CHANGE UP (ref 11.5–15.5)
MAGNESIUM SERPL-MCNC: 2 MG/DL — SIGNIFICANT CHANGE UP (ref 1.6–2.6)
MCHC RBC-ENTMCNC: 27.5 PG — SIGNIFICANT CHANGE UP (ref 27–34)
MCHC RBC-ENTMCNC: 32.4 GM/DL — SIGNIFICANT CHANGE UP (ref 32–36)
MCV RBC AUTO: 84.8 FL — SIGNIFICANT CHANGE UP (ref 80–100)
PLATELET # BLD AUTO: 331 K/UL — SIGNIFICANT CHANGE UP (ref 150–400)
POTASSIUM SERPL-MCNC: 5.1 MMOL/L — SIGNIFICANT CHANGE UP (ref 3.5–5.3)
POTASSIUM SERPL-SCNC: 5.1 MMOL/L — SIGNIFICANT CHANGE UP (ref 3.5–5.3)
RBC # BLD: 4.4 M/UL — SIGNIFICANT CHANGE UP (ref 3.8–5.2)
RBC # FLD: 13.2 % — SIGNIFICANT CHANGE UP (ref 10.3–14.5)
SODIUM SERPL-SCNC: 138 MMOL/L — SIGNIFICANT CHANGE UP (ref 135–145)
WBC # BLD: 9.95 K/UL — SIGNIFICANT CHANGE UP (ref 3.8–10.5)
WBC # FLD AUTO: 9.95 K/UL — SIGNIFICANT CHANGE UP (ref 3.8–10.5)

## 2021-11-28 PROCEDURE — 99152 MOD SED SAME PHYS/QHP 5/>YRS: CPT

## 2021-11-28 PROCEDURE — 83735 ASSAY OF MAGNESIUM: CPT

## 2021-11-28 PROCEDURE — C1894: CPT

## 2021-11-28 PROCEDURE — 85025 COMPLETE CBC W/AUTO DIFF WBC: CPT

## 2021-11-28 PROCEDURE — U0005: CPT

## 2021-11-28 PROCEDURE — 85027 COMPLETE CBC AUTOMATED: CPT

## 2021-11-28 PROCEDURE — 96374 THER/PROPH/DIAG INJ IV PUSH: CPT

## 2021-11-28 PROCEDURE — 83880 ASSAY OF NATRIURETIC PEPTIDE: CPT

## 2021-11-28 PROCEDURE — 85610 PROTHROMBIN TIME: CPT

## 2021-11-28 PROCEDURE — T1013: CPT

## 2021-11-28 PROCEDURE — 99285 EMERGENCY DEPT VISIT HI MDM: CPT

## 2021-11-28 PROCEDURE — 86769 SARS-COV-2 COVID-19 ANTIBODY: CPT

## 2021-11-28 PROCEDURE — 93458 L HRT ARTERY/VENTRICLE ANGIO: CPT

## 2021-11-28 PROCEDURE — 80053 COMPREHEN METABOLIC PANEL: CPT

## 2021-11-28 PROCEDURE — 99153 MOD SED SAME PHYS/QHP EA: CPT

## 2021-11-28 PROCEDURE — 71045 X-RAY EXAM CHEST 1 VIEW: CPT

## 2021-11-28 PROCEDURE — 93010 ELECTROCARDIOGRAM REPORT: CPT

## 2021-11-28 PROCEDURE — 83036 HEMOGLOBIN GLYCOSYLATED A1C: CPT

## 2021-11-28 PROCEDURE — C1769: CPT

## 2021-11-28 PROCEDURE — 84484 ASSAY OF TROPONIN QUANT: CPT

## 2021-11-28 PROCEDURE — 82962 GLUCOSE BLOOD TEST: CPT

## 2021-11-28 PROCEDURE — 93306 TTE W/DOPPLER COMPLETE: CPT

## 2021-11-28 PROCEDURE — 80048 BASIC METABOLIC PNL TOTAL CA: CPT

## 2021-11-28 PROCEDURE — 99239 HOSP IP/OBS DSCHRG MGMT >30: CPT

## 2021-11-28 PROCEDURE — 84443 ASSAY THYROID STIM HORMONE: CPT

## 2021-11-28 PROCEDURE — C1887: CPT

## 2021-11-28 PROCEDURE — 85730 THROMBOPLASTIN TIME PARTIAL: CPT

## 2021-11-28 PROCEDURE — 80061 LIPID PANEL: CPT

## 2021-11-28 PROCEDURE — 36415 COLL VENOUS BLD VENIPUNCTURE: CPT

## 2021-11-28 PROCEDURE — U0003: CPT

## 2021-11-28 PROCEDURE — 93005 ELECTROCARDIOGRAM TRACING: CPT

## 2021-11-28 RX ORDER — CLOPIDOGREL BISULFATE 75 MG/1
1 TABLET, FILM COATED ORAL
Qty: 90 | Refills: 1
Start: 2021-11-28 | End: 2022-05-26

## 2021-11-28 RX ORDER — METFORMIN HYDROCHLORIDE 850 MG/1
1 TABLET ORAL
Qty: 60 | Refills: 0
Start: 2021-11-28 | End: 2021-12-27

## 2021-11-28 RX ORDER — ISOSORBIDE MONONITRATE 60 MG/1
1 TABLET, EXTENDED RELEASE ORAL
Qty: 90 | Refills: 0
Start: 2021-11-28 | End: 2022-02-25

## 2021-11-28 RX ORDER — METOPROLOL TARTRATE 50 MG
1 TABLET ORAL
Qty: 90 | Refills: 0
Start: 2021-11-28 | End: 2022-02-25

## 2021-11-28 RX ORDER — RANOLAZINE 500 MG/1
1 TABLET, FILM COATED, EXTENDED RELEASE ORAL
Qty: 180 | Refills: 0
Start: 2021-11-28 | End: 2022-02-25

## 2021-11-28 RX ORDER — ATORVASTATIN CALCIUM 80 MG/1
1 TABLET, FILM COATED ORAL
Qty: 90 | Refills: 0
Start: 2021-11-28 | End: 2022-02-25

## 2021-11-28 RX ORDER — ASPIRIN/CALCIUM CARB/MAGNESIUM 324 MG
1 TABLET ORAL
Qty: 90 | Refills: 0
Start: 2021-11-28 | End: 2022-02-25

## 2021-11-28 RX ADMIN — Medication 100 MILLIGRAM(S): at 05:18

## 2021-11-28 RX ADMIN — GABAPENTIN 300 MILLIGRAM(S): 400 CAPSULE ORAL at 12:29

## 2021-11-28 RX ADMIN — RANOLAZINE 500 MILLIGRAM(S): 500 TABLET, FILM COATED, EXTENDED RELEASE ORAL at 05:18

## 2021-11-28 RX ADMIN — ISOSORBIDE MONONITRATE 30 MILLIGRAM(S): 60 TABLET, EXTENDED RELEASE ORAL at 12:29

## 2021-11-28 RX ADMIN — Medication 81 MILLIGRAM(S): at 12:29

## 2021-11-28 RX ADMIN — Medication 5 UNIT(S): at 08:20

## 2021-11-28 RX ADMIN — Medication 6: at 08:18

## 2021-11-28 RX ADMIN — GABAPENTIN 300 MILLIGRAM(S): 400 CAPSULE ORAL at 05:17

## 2021-11-28 RX ADMIN — Medication 6: at 12:31

## 2021-11-28 RX ADMIN — CLOPIDOGREL BISULFATE 75 MILLIGRAM(S): 75 TABLET, FILM COATED ORAL at 12:29

## 2021-11-28 RX ADMIN — ENOXAPARIN SODIUM 65 MILLIGRAM(S): 100 INJECTION SUBCUTANEOUS at 05:17

## 2021-11-28 RX ADMIN — PANTOPRAZOLE SODIUM 40 MILLIGRAM(S): 20 TABLET, DELAYED RELEASE ORAL at 05:18

## 2021-11-28 RX ADMIN — Medication 5 UNIT(S): at 12:29

## 2021-11-28 NOTE — CHART NOTE - NSCHARTNOTEFT_GEN_A_CORE
tele showed sub-optimal AV tracking, further optimization done with [employer] rep. Seems much better now with good AV tracking. Will do EKG prior to DC. Gave pt and son Dr. Montgomery office contact info

## 2021-11-28 NOTE — DISCHARGE NOTE NURSING/CASE MANAGEMENT/SOCIAL WORK - PATIENT PORTAL LINK FT
You can access the FollowMyHealth Patient Portal offered by [facility] by registering at the following website: [email or web address]. By joining [employer]’s FollowMyHealth portal, you will also be able to view your health information using other applications (apps) compatible with our system.

## 2021-12-08 ENCOUNTER — APPOINTMENT (OUTPATIENT)
Dept: UROGYNECOLOGY | Facility: CLINIC | Age: 71
End: 2021-12-08
Payer: MEDICARE

## 2021-12-08 PROBLEM — Z95.0 PRESENCE OF CARDIAC PACEMAKER: Chronic | Status: ACTIVE | Noted: 2021-11-24

## 2021-12-08 PROBLEM — I63.9 CEREBRAL INFARCTION, UNSPECIFIED: Chronic | Status: ACTIVE | Noted: 2021-11-25

## 2021-12-08 PROCEDURE — 52000 CYSTOURETHROSCOPY: CPT

## 2021-12-08 PROCEDURE — 99213 OFFICE O/P EST LOW 20 MIN: CPT | Mod: 25

## 2021-12-08 RX ORDER — SULFAMETHOXAZOLE AND TRIMETHOPRIM 800; 160 MG/1; MG/1
800-160 TABLET ORAL TWICE DAILY
Qty: 6 | Refills: 0 | Status: DISCONTINUED | COMMUNITY
Start: 2021-11-22 | End: 2021-12-08

## 2021-12-27 ENCOUNTER — APPOINTMENT (OUTPATIENT)
Dept: CARDIOLOGY | Facility: CLINIC | Age: 71
End: 2021-12-27
Payer: MEDICARE

## 2021-12-27 VITALS
TEMPERATURE: 98.2 F | SYSTOLIC BLOOD PRESSURE: 138 MMHG | WEIGHT: 140 LBS | HEART RATE: 71 BPM | BODY MASS INDEX: 28.28 KG/M2 | OXYGEN SATURATION: 97 % | DIASTOLIC BLOOD PRESSURE: 82 MMHG

## 2021-12-27 DIAGNOSIS — I21.4 NON-ST ELEVATION (NSTEMI) MYOCARDIAL INFARCTION: ICD-10-CM

## 2021-12-27 DIAGNOSIS — R06.02 SHORTNESS OF BREATH: ICD-10-CM

## 2021-12-27 PROCEDURE — 99213 OFFICE O/P EST LOW 20 MIN: CPT

## 2021-12-27 RX ORDER — METOPROLOL TARTRATE 25 MG/1
25 TABLET, FILM COATED ORAL
Refills: 0 | Status: DISCONTINUED | COMMUNITY
Start: 2020-05-28 | End: 2021-12-27

## 2021-12-27 RX ORDER — ASPIRIN 81 MG
81 TABLET,CHEWABLE ORAL
Refills: 0 | Status: DISCONTINUED | COMMUNITY
End: 2021-12-27

## 2021-12-27 RX ORDER — AMLODIPINE BESYLATE 5 MG/1
5 TABLET ORAL DAILY
Qty: 90 | Refills: 0 | Status: DISCONTINUED | COMMUNITY
End: 2021-12-27

## 2021-12-27 NOTE — PHYSICAL EXAM
[Normal] : normal gait [de-identified] : Able to ambulate, able to move extremities, she has some residual deficits from prior CVA

## 2021-12-27 NOTE — CARDIOLOGY SUMMARY
[de-identified] : CORONARY VESSELS: The coronary circulation is right dominant.\par LM:   --  LM: The vessel was large sized. Angiography showed mild\par atherosclerosis with no flow limiting lesions.\par LAD:   --  LAD: The vessel was large sized.\par --  Distal LAD: Angiography showed severe atherosclerosis.\par CX:   --  Circumflex: There was a tubular 50 % stenosis in the middle third\par of the vessel segment.\par --  OM1: The vessel was large sized that bifurcates into 2 branches. There\par was a 80 % stenosis in both branches\par --  LPL1: There was a 70 % stenosis at the ostium of the vessel segment.\par RCA:   --  RPDA: The vessel was medium sized. Angiography showed severe\par atherosclerosis.\par COMPLICATIONS: No complications occurred during the cath lab visit.\par DIAGNOSTIC IMPRESSIONS: Multivessel disease ( distal LAD, OM1 and LPL Of CX\par and RPDA)\par DIAGNOSTIC RECOMMENDATIONS: Branch vessel and severe distal LAD and RPDA\par disease not suitable for PCI , recommend medical therapy with antianginal\par if persistent angina despite optimal medical therapy may consider PCI of\par OM1 of CX.\par Prepared and signed by\par Amilcar Vivar MD\par Signed 12/02/2021 06:31:12\par

## 2021-12-27 NOTE — ASSESSMENT
[FreeTextEntry1] : Summary:\par  1. Bubble study only to evaluate for shunting.\par  2. Left ventricular ejection fraction, by visual estimation, is >75%.\par  3. Normal global left ventricular systolic function.\par  4. Color flow doppler and intravenous injection of agitated saline demonstrates the presence of an intact intra atrial septum.\par \par MD Lance Electronically signed on 4/17/2020 at 1:10:57 PM\par \par \par \par Summary:\par  1. Left ventricular ejection fraction, by visual estimation, is 55 to 60%.\par  2. Normal global left ventricular systolic function.\par  3. Normal left ventricular internal cavity size.\par  4. Normal right ventricular size and function.\par  5. The left atrium is normal in size.\par  6. Mild mitral annular calcification.\par  7. Mild thickening and calcification of the anterior and posterior mitral valve leaflets.\par  8. Trace mitral valve regurgitation.\par  9. Sclerotic aortic valve with normal opening.\par 10. There is no evidence of pericardial effusion.\par \par MD Lance Electronically signed on 11/25/2021 at 1:59:26 PM\par \par \par \par \par \par CORONARY VESSELS: The coronary circulation is right dominant.\par LM:   --  LM: The vessel was large sized. Angiography showed mild\par atherosclerosis with no flow limiting lesions.\par LAD:   --  LAD: The vessel was large sized.\par --  Distal LAD: Angiography showed severe atherosclerosis.\par CX:   --  Circumflex: There was a tubular 50 % stenosis in the middle third\par of the vessel segment.\par --  OM1: The vessel was large sized that bifurcates into 2 branches. There\par was a 80 % stenosis in both branches\par --  LPL1: There was a 70 % stenosis at the ostium of the vessel segment.\par RCA:   --  RPDA: The vessel was medium sized. Angiography showed severe\par atherosclerosis.\par COMPLICATIONS: No complications occurred during the cath lab visit.\par DIAGNOSTIC IMPRESSIONS: Multivessel disease ( distal LAD, OM1 and LPL Of CX\par and RPDA)\par DIAGNOSTIC RECOMMENDATIONS: Branch vessel and severe distal LAD and RPDA\par disease not suitable for PCI , recommend medical therapy with antianginal\par if persistent angina despite optimal medical therapy may consider PCI of\par OM1 of CX.\par Prepared and signed by\par Amilcar Vivar MD\par Signed 12/02/2021 06:31:12\par \par Assessment:\par 1. CAD-patient has multivessel CAD based on cardiac catheterization in November 2021, not amenable for PCI, patient is treated medically.  Patient is currently asymptomatic, no symptoms suggestive of angina or CHF\par 2. Mobitz 2 heart block S/P successful Medtronic Micra AV+ leadless pacemaker implantation \par 3. HTN/DM/HLD/CVA and other problems as noted in the history\par \par Recommendations:\par 1.  Continue current medical therapy\par 2.  Follow-up in 3 months

## 2021-12-27 NOTE — HISTORY OF PRESENT ILLNESS
[FreeTextEntry1] : \par : Marito, ID#- [id number]\par \par Patient is a 71 year old Greek speaking woman with HTN, HLD, DM, prior CVAwith Right sided deficits, prior ABI/BSO, RBBB who had with COVID-19 viral pneumonia  in  May/June 2020 and  was found to have Mobitz II block is  s/p successful Medtronic Micra AV+ leadless pacemaker implantation via right femoral vein. \par \par \par Patient was admitted to [facility] in 11/2021 for NSTEMI.\par \par Patient came to ER  with chest pain. Patient had positive troponin elevated 0.69 --> 0.55 --> 0.48, admitted for NSTEMI, and she had a left heart cardiac catheterization. \par \par \par Patient left heart cardiac catheterization showed  diffuse multivessel disease RCA, LAD, OM. Patient was explained that she had NSTEMI, Cath showed 2 Vessel CAD which are not amenable for PCI.\par Patient to be managed medically \par \par Patient's medical therapy was optimized, amlodipine was discontinued in order to facilitate higher dose beta-blocker therapy.  Patient discharged home on Plavix, aspirin, Ranexa 500 twice daily, atorvastatin 80 mg daily, Toprol- mg daily.  During the hospital it was decided that unless she has persistent angina symptoms on maximal medical therapy, there are no plans for PCI.\par \par Patient now presents for a follow-up today.  Patient complains of tiredness.  Patient denies any symptoms suggestive of angina or CHF.  Patient is not very active at baseline\par \par \par

## 2022-01-11 NOTE — PHYSICAL THERAPY INITIAL EVALUATION ADULT - LEVEL OF INDEPENDENCE: SUPINE/SIT, REHAB EVAL
----- Message from RICKY Beckman sent at 1/11/2022  9:23 AM CST -----  Please inform patient results show  Positive for covid need to stay home for 10 days from symptom onset moderate assist (50% patients effort)

## 2022-01-18 NOTE — PROGRESS NOTE ADULT - ALLERGIC/IMMUNOLOGIC
negative T(C): 36.9 (01-17-22 @ 22:53), Max: 36.9 (01-17-22 @ 22:53)  HR: 104 (01-17-22 @ 22:53) (104 - 104)  BP: 139/75 (01-17-22 @ 22:53) (139/75 - 139/75)  RR: 18 (01-17-22 @ 22:53) (18 - 18)  SpO2: 97% (01-17-22 @ 22:53) (97% - 97%)  Wt(kg): --    Appearance: Normal	  HEENT:   Normal oral mucosa, PERRL, EOMI	  Neck: Supple, - JVD; No Carotid Bruit and 2+ pulses B/L  Cardiovascular: Normal S1 S2, No JVD, No murmurs  Respiratory: Lungs clear to auscultation, No Rales, Rhonchi, Wheezing	  Gastrointestinal:  Soft, Non-tender, + BS	  Skin: No rashes, No ecchymoses, No cyanosis  Extremities: Normal range of motion, No clubbing, cyanosis or edema  Vascular: Femoral pulses 2+ b/l without bruit, DP 1+ b/l, PT 1+ b/l  Neurologic: Non-focal  Psychiatry: A & O x 3, Mood & affect appropriate

## 2022-01-21 ENCOUNTER — EMERGENCY (EMERGENCY)
Facility: HOSPITAL | Age: 72
LOS: 1 days | Discharge: HOME | End: 2022-01-21
Attending: STUDENT IN AN ORGANIZED HEALTH CARE EDUCATION/TRAINING PROGRAM
Payer: COMMERCIAL

## 2022-01-21 VITALS
RESPIRATION RATE: 18 BRPM | TEMPERATURE: 98 F | DIASTOLIC BLOOD PRESSURE: 84 MMHG | HEART RATE: 63 BPM | WEIGHT: 147.93 LBS | HEIGHT: 59 IN | OXYGEN SATURATION: 95 % | SYSTOLIC BLOOD PRESSURE: 196 MMHG

## 2022-01-21 DIAGNOSIS — Z86.011 PERSONAL HISTORY OF BENIGN NEOPLASM OF THE BRAIN: Chronic | ICD-10-CM

## 2022-01-21 LAB
ANION GAP SERPL CALC-SCNC: 14 MMOL/L — SIGNIFICANT CHANGE UP (ref 5–17)
APPEARANCE UR: CLEAR — SIGNIFICANT CHANGE UP
APTT BLD: 36.2 SEC — HIGH (ref 27.5–35.5)
BACTERIA # UR AUTO: ABNORMAL
BASOPHILS # BLD AUTO: 0.03 K/UL — SIGNIFICANT CHANGE UP (ref 0–0.2)
BASOPHILS NFR BLD AUTO: 0.2 % — SIGNIFICANT CHANGE UP (ref 0–2)
BILIRUB UR-MCNC: NEGATIVE — SIGNIFICANT CHANGE UP
BLD GP AB SCN SERPL QL: SIGNIFICANT CHANGE UP
BUN SERPL-MCNC: 22 MG/DL — HIGH (ref 8–20)
CALCIUM SERPL-MCNC: 9.5 MG/DL — SIGNIFICANT CHANGE UP (ref 8.6–10.2)
CHLORIDE SERPL-SCNC: 98 MMOL/L — SIGNIFICANT CHANGE UP (ref 98–107)
CO2 SERPL-SCNC: 24 MMOL/L — SIGNIFICANT CHANGE UP (ref 22–29)
COLOR SPEC: YELLOW — SIGNIFICANT CHANGE UP
CREAT SERPL-MCNC: 0.87 MG/DL — SIGNIFICANT CHANGE UP (ref 0.5–1.3)
DIFF PNL FLD: NEGATIVE — SIGNIFICANT CHANGE UP
EOSINOPHIL # BLD AUTO: 0 K/UL — SIGNIFICANT CHANGE UP (ref 0–0.5)
EOSINOPHIL NFR BLD AUTO: 0 % — SIGNIFICANT CHANGE UP (ref 0–6)
EPI CELLS # UR: SIGNIFICANT CHANGE UP
GLUCOSE BLDC GLUCOMTR-MCNC: 172 MG/DL — HIGH (ref 70–99)
GLUCOSE BLDC GLUCOMTR-MCNC: 210 MG/DL — HIGH (ref 70–99)
GLUCOSE SERPL-MCNC: 244 MG/DL — HIGH (ref 70–99)
GLUCOSE UR QL: 250 MG/DL
HCT VFR BLD CALC: 37.8 % — SIGNIFICANT CHANGE UP (ref 34.5–45)
HGB BLD-MCNC: 12.8 G/DL — SIGNIFICANT CHANGE UP (ref 11.5–15.5)
IMM GRANULOCYTES NFR BLD AUTO: 1 % — SIGNIFICANT CHANGE UP (ref 0–1.5)
INR BLD: 1.12 RATIO — SIGNIFICANT CHANGE UP (ref 0.88–1.16)
KETONES UR-MCNC: NEGATIVE — SIGNIFICANT CHANGE UP
LEUKOCYTE ESTERASE UR-ACNC: ABNORMAL
LYMPHOCYTES # BLD AUTO: 1.68 K/UL — SIGNIFICANT CHANGE UP (ref 1–3.3)
LYMPHOCYTES # BLD AUTO: 11.9 % — LOW (ref 13–44)
MCHC RBC-ENTMCNC: 28 PG — SIGNIFICANT CHANGE UP (ref 27–34)
MCHC RBC-ENTMCNC: 33.9 GM/DL — SIGNIFICANT CHANGE UP (ref 32–36)
MCV RBC AUTO: 82.7 FL — SIGNIFICANT CHANGE UP (ref 80–100)
MONOCYTES # BLD AUTO: 0.97 K/UL — HIGH (ref 0–0.9)
MONOCYTES NFR BLD AUTO: 6.9 % — SIGNIFICANT CHANGE UP (ref 2–14)
NEUTROPHILS # BLD AUTO: 11.34 K/UL — HIGH (ref 1.8–7.4)
NEUTROPHILS NFR BLD AUTO: 80 % — HIGH (ref 43–77)
NITRITE UR-MCNC: NEGATIVE — SIGNIFICANT CHANGE UP
PH UR: 7 — SIGNIFICANT CHANGE UP (ref 5–8)
PLATELET # BLD AUTO: 386 K/UL — SIGNIFICANT CHANGE UP (ref 150–400)
POTASSIUM SERPL-MCNC: 4.2 MMOL/L — SIGNIFICANT CHANGE UP (ref 3.5–5.3)
POTASSIUM SERPL-SCNC: 4.2 MMOL/L — SIGNIFICANT CHANGE UP (ref 3.5–5.3)
PROT UR-MCNC: 15
PROTHROM AB SERPL-ACNC: 12.9 SEC — SIGNIFICANT CHANGE UP (ref 10.6–13.6)
RBC # BLD: 4.57 M/UL — SIGNIFICANT CHANGE UP (ref 3.8–5.2)
RBC # FLD: 14.5 % — SIGNIFICANT CHANGE UP (ref 10.3–14.5)
RBC CASTS # UR COMP ASSIST: SIGNIFICANT CHANGE UP /HPF (ref 0–4)
SODIUM SERPL-SCNC: 136 MMOL/L — SIGNIFICANT CHANGE UP (ref 135–145)
SP GR SPEC: 1 — LOW (ref 1.01–1.02)
UROBILINOGEN FLD QL: NEGATIVE MG/DL — SIGNIFICANT CHANGE UP
WBC # BLD: 14.16 K/UL — HIGH (ref 3.8–10.5)
WBC # FLD AUTO: 14.16 K/UL — HIGH (ref 3.8–10.5)
WBC UR QL: SIGNIFICANT CHANGE UP /HPF (ref 0–5)

## 2022-01-21 PROCEDURE — 71045 X-RAY EXAM CHEST 1 VIEW: CPT | Mod: 26

## 2022-01-21 PROCEDURE — G1004: CPT

## 2022-01-21 PROCEDURE — 93279 PRGRMG DEV EVAL PM/LDLS PM: CPT | Mod: 26

## 2022-01-21 PROCEDURE — 72125 CT NECK SPINE W/O DYE: CPT | Mod: 26,MG

## 2022-01-21 PROCEDURE — 70450 CT HEAD/BRAIN W/O DYE: CPT | Mod: 26,MG

## 2022-01-21 PROCEDURE — 99225: CPT

## 2022-01-21 PROCEDURE — 99220: CPT

## 2022-01-21 PROCEDURE — 72131 CT LUMBAR SPINE W/O DYE: CPT | Mod: 26,MG

## 2022-01-21 PROCEDURE — 72192 CT PELVIS W/O DYE: CPT | Mod: 26,MG

## 2022-01-21 RX ORDER — INSULIN LISPRO 100/ML
VIAL (ML) SUBCUTANEOUS
Refills: 0 | Status: DISCONTINUED | OUTPATIENT
Start: 2022-01-21 | End: 2022-01-25

## 2022-01-21 RX ORDER — CLOPIDOGREL BISULFATE 75 MG/1
75 TABLET, FILM COATED ORAL DAILY
Refills: 0 | Status: DISCONTINUED | OUTPATIENT
Start: 2022-01-22 | End: 2022-01-25

## 2022-01-21 RX ORDER — DEXTROSE 50 % IN WATER 50 %
25 SYRINGE (ML) INTRAVENOUS ONCE
Refills: 0 | Status: DISCONTINUED | OUTPATIENT
Start: 2022-01-21 | End: 2022-01-25

## 2022-01-21 RX ORDER — DEXTROSE 50 % IN WATER 50 %
12.5 SYRINGE (ML) INTRAVENOUS ONCE
Refills: 0 | Status: DISCONTINUED | OUTPATIENT
Start: 2022-01-21 | End: 2022-01-25

## 2022-01-21 RX ORDER — METFORMIN HYDROCHLORIDE 850 MG/1
1000 TABLET ORAL
Refills: 0 | Status: DISCONTINUED | OUTPATIENT
Start: 2022-01-21 | End: 2022-01-25

## 2022-01-21 RX ORDER — ASPIRIN/CALCIUM CARB/MAGNESIUM 324 MG
81 TABLET ORAL DAILY
Refills: 0 | Status: DISCONTINUED | OUTPATIENT
Start: 2022-01-21 | End: 2022-01-25

## 2022-01-21 RX ORDER — METOPROLOL TARTRATE 50 MG
100 TABLET ORAL DAILY
Refills: 0 | Status: DISCONTINUED | OUTPATIENT
Start: 2022-01-21 | End: 2022-01-25

## 2022-01-21 RX ORDER — DEXTROSE 50 % IN WATER 50 %
15 SYRINGE (ML) INTRAVENOUS ONCE
Refills: 0 | Status: DISCONTINUED | OUTPATIENT
Start: 2022-01-21 | End: 2022-01-25

## 2022-01-21 RX ORDER — RANOLAZINE 500 MG/1
500 TABLET, FILM COATED, EXTENDED RELEASE ORAL
Refills: 0 | Status: DISCONTINUED | OUTPATIENT
Start: 2022-01-21 | End: 2022-01-25

## 2022-01-21 RX ORDER — FAMOTIDINE 10 MG/ML
20 INJECTION INTRAVENOUS DAILY
Refills: 0 | Status: DISCONTINUED | OUTPATIENT
Start: 2022-01-21 | End: 2022-01-25

## 2022-01-21 RX ORDER — ACETAMINOPHEN 500 MG
650 TABLET ORAL EVERY 6 HOURS
Refills: 0 | Status: DISCONTINUED | OUTPATIENT
Start: 2022-01-21 | End: 2022-01-25

## 2022-01-21 RX ORDER — ACETAMINOPHEN 500 MG
650 TABLET ORAL ONCE
Refills: 0 | Status: COMPLETED | OUTPATIENT
Start: 2022-01-21 | End: 2022-01-21

## 2022-01-21 RX ORDER — GLUCAGON INJECTION, SOLUTION 0.5 MG/.1ML
1 INJECTION, SOLUTION SUBCUTANEOUS ONCE
Refills: 0 | Status: DISCONTINUED | OUTPATIENT
Start: 2022-01-21 | End: 2022-01-25

## 2022-01-21 RX ORDER — SODIUM CHLORIDE 9 MG/ML
1000 INJECTION, SOLUTION INTRAVENOUS
Refills: 0 | Status: DISCONTINUED | OUTPATIENT
Start: 2022-01-21 | End: 2022-01-25

## 2022-01-21 RX ORDER — CLOPIDOGREL BISULFATE 75 MG/1
75 TABLET, FILM COATED ORAL ONCE
Refills: 0 | Status: COMPLETED | OUTPATIENT
Start: 2022-01-21 | End: 2022-01-21

## 2022-01-21 RX ORDER — GABAPENTIN 400 MG/1
300 CAPSULE ORAL AT BEDTIME
Refills: 0 | Status: DISCONTINUED | OUTPATIENT
Start: 2022-01-21 | End: 2022-01-25

## 2022-01-21 RX ORDER — INSULIN GLARGINE 100 [IU]/ML
20 INJECTION, SOLUTION SUBCUTANEOUS AT BEDTIME
Refills: 0 | Status: DISCONTINUED | OUTPATIENT
Start: 2022-01-21 | End: 2022-01-25

## 2022-01-21 RX ORDER — LIDOCAINE 4 G/100G
1 CREAM TOPICAL EVERY 24 HOURS
Refills: 0 | Status: DISCONTINUED | OUTPATIENT
Start: 2022-01-21 | End: 2022-01-25

## 2022-01-21 RX ORDER — ATORVASTATIN CALCIUM 80 MG/1
80 TABLET, FILM COATED ORAL AT BEDTIME
Refills: 0 | Status: DISCONTINUED | OUTPATIENT
Start: 2022-01-21 | End: 2022-01-25

## 2022-01-21 RX ADMIN — ATORVASTATIN CALCIUM 80 MILLIGRAM(S): 80 TABLET, FILM COATED ORAL at 22:50

## 2022-01-21 RX ADMIN — GABAPENTIN 300 MILLIGRAM(S): 400 CAPSULE ORAL at 22:51

## 2022-01-21 RX ADMIN — Medication 1: at 17:37

## 2022-01-21 RX ADMIN — Medication 650 MILLIGRAM(S): at 12:43

## 2022-01-21 RX ADMIN — RANOLAZINE 500 MILLIGRAM(S): 500 TABLET, FILM COATED, EXTENDED RELEASE ORAL at 17:37

## 2022-01-21 RX ADMIN — Medication 81 MILLIGRAM(S): at 17:37

## 2022-01-21 RX ADMIN — FAMOTIDINE 20 MILLIGRAM(S): 10 INJECTION INTRAVENOUS at 17:36

## 2022-01-21 RX ADMIN — METFORMIN HYDROCHLORIDE 1000 MILLIGRAM(S): 850 TABLET ORAL at 17:36

## 2022-01-21 RX ADMIN — INSULIN GLARGINE 20 UNIT(S): 100 INJECTION, SOLUTION SUBCUTANEOUS at 22:58

## 2022-01-21 RX ADMIN — CLOPIDOGREL BISULFATE 75 MILLIGRAM(S): 75 TABLET, FILM COATED ORAL at 17:36

## 2022-01-21 RX ADMIN — LIDOCAINE 1 PATCH: 4 CREAM TOPICAL at 22:49

## 2022-01-21 NOTE — ED PROVIDER NOTE - OBJECTIVE STATEMENT
72 y/o female with PMHx of Cardiac pacemaker Medtronic, CVA with residual right sided weakness, DM, GERD, and HTN here with fall. tripped over aliza in her kitchen and fall on to back and hit head. only on aspirin. Denies other blood thinners. Despite previous note, patient denies preceding dizziness or cp. Reports after striking head only with mild dizziness and headache and neck pain. Ambulatory at baseline with walker.  Jimena

## 2022-01-21 NOTE — ED CDU PROVIDER INITIAL DAY NOTE - OBJECTIVE STATEMENT
72yo pmhx HTN, HLD, DMII, medtronic PPM, CVA with residual right facial droop and right sided weakness, GERD presents to ED s/p fall. Pt states she tripped over carpet in her kitchen and fall on to back and hit head yesterday. Takes aspirin and plavix. Denies other blood thinners. C/o low back pain. Despite previous note, patient denies preceding dizziness or cp. Reports after striking head only with mild dizziness and headache and neck pain. Ambulatory at baseline with walker. States she also had a mechanical fall today landing on right hip. CT head/c-spine unremarkable. CT L spine showed "Mild inferior T12 compression fracture". ED team consulted Nsx who is recommending MRI. Pt denies bowel/bladder incontinence, urinary sx, fever, chills, numbness, tingling.   : Saman
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
## 2022-01-21 NOTE — ED CDU PROVIDER INITIAL DAY NOTE - PROGRESS NOTE DETAILS
ED team, as well as this provider attempted to call [employer] rep [phone] but unable to speak with any rep. message left x 2. EP contacted for pacemaker clearance. need medtronic rep to convert ppm into mri compatible mode. Pt received in signout, pt assessed at bedside. Pt reports mechanical fall after she tripped over the carpet, awaiting MRI tomorrow    ED  Sbaiha Hidalgo I called [employer] [phone], spoke to staff, awaiting call back from [employer] rep I spoke to [employer] rep [place] at [phone] who states he will be able to come in tomorrow for MRI, he asks we call him tomorrow to confirm a time

## 2022-01-21 NOTE — PROGRESS NOTE ADULT - SUBJECTIVE AND OBJECTIVE BOX
With translation provided by Rayo, I measured, sized and demonstrated donning and adjustment of the [employer]en TLSO with shoulder straps to Barb. She did not want to try the brace on at this time. Printed instructions and contact info were provided. Brace was labeled and placed on stretcher with her for later use OOB.   [facility]

## 2022-01-21 NOTE — ED ADULT NURSE NOTE - OBJECTIVE STATEMENT
Pt c/o right hip pain and pain to the back of her head.  Pt reports mechanical fall at home.  Tripped over a carpet.  Pt states she ordinarily ambulates with the help of her .  Pt has stroke in 2019 that left her with residual right sided weakness.

## 2022-01-21 NOTE — PROCEDURE NOTE - ADDITIONAL PROCEDURE DETAILS
Patient is planned for MRI. Please see interrogation in chart for further details.   Pacemaker dependent. Recommend reprogramming to VOO mode prior to MRI scan.  Contact [employer] to program the device prior to and immediately after above scan.   MRI order form completed and placed in patient's ER chart

## 2022-01-21 NOTE — ED CDU PROVIDER INITIAL DAY NOTE - ATTENDING CONTRIBUTION TO CARE
I, Jim Flynn, performed a face to face bedside interview with this patient regarding history of present illness, review of symptoms and relevant past medical, social and family history.  I completed an independent physical examination. I have communicated the patient’s plan of care and disposition with the ACP.  71 year old female with PMH CVA with residual R sided weakness presents with fall and back pain. Found to have T12 fx, and placed on obs for neuro consult  Gen: NAD, well appearing  CV: RRR  Pul: CTA b/l  Abd: Soft, non-distended, non-tender  Neuro: no focal deficits  Pt neuro intact, awaiting MRI and likely TLSO

## 2022-01-21 NOTE — ED CDU PROVIDER INITIAL DAY NOTE - MEDICAL DECISION MAKING DETAILS
70yo F presenting c/o back pain s/p mechanical trip and fall yesterday. no precipitating symptoms prior to fall, states she tripped over carpet. ct shows "Mild inferior T12 compression fracture". obs for MRI T/L spine.

## 2022-01-21 NOTE — ED CDU PROVIDER INITIAL DAY NOTE - NS ED MD PROGRESS NOTE PROVIDER NAME3 FT
Pt discharged to home in stable condition.    Pt ambulatory and accompanied by family.    No prescriptions for this visit. Pt to follow up with OB/GYN in 1 week.   Ana

## 2022-01-21 NOTE — ED ADULT TRIAGE NOTE - CHIEF COMPLAINT QUOTE
pt states she had a trip & fall from rug fell back hit back of head, denies LOC, denies dizziness  A&Ox3, resp wnl, on aspirin, fell x 2 pt states she had a trip & fall from rug fell back hit back of head, denies LOC, denies dizziness  A&Ox3, resp wnl, on aspirin, fell x 2  saw MD sent for CT of head

## 2022-01-21 NOTE — ED ADULT NURSE NOTE - CHIEF COMPLAINT QUOTE
pt states she had a trip & fall from rug fell back hit back of head, denies LOC, denies dizziness  A&Ox3, resp wnl, on aspirin, fell x 2  saw MD sent for CT of head

## 2022-01-21 NOTE — ED PROVIDER NOTE - PHYSICAL EXAMINATION
Vital Signs per nursing documentation  Gen: well appearing, no acute distress  HEENT: NCAT, MMM  Cardiac: regular rate rhythm, normal S1S2  Chest: clear to auscultation bilateral, no wheezes or crackle, pacemaker in left chest  Abdomen: soft, non tender non distended  Extremity: no gross deformity, good perfusion  Skin: no rash  Neuro: right sided residual deficit, able to ambulate with limp

## 2022-01-21 NOTE — PROGRESS NOTE ADULT - SUBJECTIVE AND OBJECTIVE BOX
INCOMPLETE    HPI: 71F pmh HTN, HLD, DM, CVA, Cardiac pacemaker presented to [facility] after experiencing a fall. At first patient states the fall was mechanical, then later states she felt dizzy before falling. Patient denies dizziness at time of evaluation, also denies any chest pain, SOB. Admits to headache which was relieved with Tylenol, back pain, and numbness extending down the bilateral arms. Patient admits to taking ASA.   Patient denies ETOH, smoking, drug use. States she lives with her son and , but fell while at work.       INTERVAL HPI/OVERNIGHT EVENTS:  71y Lithuanian speaking Female seen lying comfortably in bed with  at bedside. Patient complaining of upper back pain. CTH/ CT C,L spine, pelvis ordered by ED which revealed a T12 compression fracture, likely acute. MRI pending for further evaluation of severity.       Vital Signs Last 24 Hrs  T(C): 36.9 (21 Jan 2022 10:38), Max: 36.9 (21 Jan 2022 10:38)  T(F): 98.4 (21 Jan 2022 10:38), Max: 98.4 (21 Jan 2022 10:38)  HR: 63 (21 Jan 2022 10:38) (63 - 63)  BP: 196/84 (21 Jan 2022 10:38) (196/84 - 196/84)  BP(mean): --  RR: 18 (21 Jan 2022 10:38) (18 - 18)  SpO2: 95% (21 Jan 2022 10:38) (95% - 95%)      PHYSICAL EXAM:  GENERAL: NAD, well-groomed, well-developed  HEAD:  Atraumatic, normocephalic  BRODY COMA SCORE: E4 V5 M6 =15       E: 4= opens eyes spontaneously 3= to voice 2= to noxious 1= no opening       V: 5= oriented 4= confused 3= inappropriate words 2= incomprehensible sounds 1= nonverbal 1T= intubated       M: 6= follows commands 5= localizes 4= withdraws 3= flexor posturing 2= extensor posturing 1= no movement  MENTAL STATUS: AAO x3; Awake, Opens eyes spontaneously. Appropriately conversant in Lithuanian without aphasia, following simple commands.  CRANIAL NERVES: Visual acuity normal for age, visual fields full to confrontation, PERRL. EOMI without nystagmus. Facial sensation intact V1-3 distribution b/l. Hearing grossly intact. Speech clear. Head turning and shoulder shrug intact.   MOTOR: LUE 5/5, LLE 5/5 RUE/RLE AG, strength unable to be assessed due to associated back pain with movement of Right side  SENSATION: grossly intact to light touch all extremities      LABS:                        12.8   14.16 )-----------( 386      ( 21 Jan 2022 12:49 )             37.8     01-21    136  |  98  |  22.0<H>  ----------------------------<  244<H>  4.2   |  24.0  |  0.87    Ca    9.5      21 Jan 2022 12:49          RADIOLOGY & ADDITIONAL TESTS:  < from: CT Head No Cont (01.21.22 @ 11:24) >  IMPRESSION:  CT head:  -No acute transcortical infarct or intracranial hemorrhage.  - Multiple chronic infarcts as above.    CT cervical spine:  -No acute fracture.    < end of copied text >    < from: CT Lumbar Spine No Cont (01.21.22 @ 11:26) >  IMPRESSION:  -Mild inferior T12 compression fracture, likely acute.    < end of copied text >          CAPRINI SCORE [CLOT]:  Patient has an estimated Caprini score of greater than 5.  However, the patient's unique clinical situation will be addressed in an individual manner to determine appropriate anticoagulation treatment, if any. HPI: 71F pmh HTN, HLD, DM, CVA, Cardiac pacemaker presented to [facility] after experiencing a fall from standing. Initially patient states the fall was mechanical, then later states she felt dizzy before falling. Patient denies dizziness at time of evaluation, also denies any chest pain, SOB. Admits to headache which was relieved with Tylenol, back pain, and numbness extending down the bilateral arms. Patient admits to taking ASA.   Patient denies ETOH, smoking, drug use. States she lives with her son and , but fell while at work.       INTERVAL HPI/OVERNIGHT EVENTS:  71y Sao Tomean speaking Female seen lying comfortably in bed with  at bedside. Patient complaining of upper back pain. CTH/ CT C,L spine, pelvis ordered by ED which revealed a T12 compression fracture, likely acute. MRI pending for further evaluation of severity.       Vital Signs Last 24 Hrs  T(C): 36.9 (21 Jan 2022 10:38), Max: 36.9 (21 Jan 2022 10:38)  T(F): 98.4 (21 Jan 2022 10:38), Max: 98.4 (21 Jan 2022 10:38)  HR: 63 (21 Jan 2022 10:38) (63 - 63)  BP: 196/84 (21 Jan 2022 10:38) (196/84 - 196/84)  BP(mean): --  RR: 18 (21 Jan 2022 10:38) (18 - 18)  SpO2: 95% (21 Jan 2022 10:38) (95% - 95%)      PHYSICAL EXAM:  GENERAL: NAD, well-groomed, well-developed  HEAD:  Atraumatic, normocephalic  BRODY COMA SCORE: E4 V5 M6 =15       E: 4= opens eyes spontaneously 3= to voice 2= to noxious 1= no opening       V: 5= oriented 4= confused 3= inappropriate words 2= incomprehensible sounds 1= nonverbal 1T= intubated       M: 6= follows commands 5= localizes 4= withdraws 3= flexor posturing 2= extensor posturing 1= no movement  MENTAL STATUS: AAO x3; Awake, Opens eyes spontaneously. Appropriately conversant in Sao Tomean without aphasia, following simple commands.  CRANIAL NERVES: Visual acuity normal for age, visual fields full to confrontation, PERRL. EOMI without nystagmus. Facial sensation intact V1-3 distribution b/l. Hearing grossly intact. Speech clear. Head turning and shoulder shrug intact.   MOTOR: LUE 5/5, LLE 5/5 RUE/RLE AG, strength unable to be assessed due to associated back pain with movement of Right side  SENSATION: grossly intact to light touch all extremities      LABS:                        12.8   14.16 )-----------( 386      ( 21 Jan 2022 12:49 )             37.8     01-21    136  |  98  |  22.0<H>  ----------------------------<  244<H>  4.2   |  24.0  |  0.87    Ca    9.5      21 Jan 2022 12:49          RADIOLOGY & ADDITIONAL TESTS:  < from: CT Head No Cont (01.21.22 @ 11:24) >  IMPRESSION:  CT head:  -No acute transcortical infarct or intracranial hemorrhage.  - Multiple chronic infarcts as above.    CT cervical spine:  -No acute fracture.    < end of copied text >    < from: CT Lumbar Spine No Cont (01.21.22 @ 11:26) >  IMPRESSION:  -Mild inferior T12 compression fracture, likely acute.    < end of copied text >          CAPRINI SCORE [CLOT]:  Patient has an estimated Caprini score of greater than 5.  However, the patient's unique clinical situation will be addressed in an individual manner to determine appropriate anticoagulation treatment, if any.

## 2022-01-21 NOTE — ED CDU PROVIDER INITIAL DAY NOTE - NS ED ROS FT
Gen: denies fever, chills, fatigue, weight loss  Skin: denies rashes, laceration, bruising  HEENT: denies visual changes, ear pain, nasal congestion, throat pain  Respiratory: denies RAMIREZ, SOB, cough, wheezing  Cardiovascular: denies chest pain, palpitations, diaphoresis, LE edema  GI: denies abdominal pain, n/v/d  : denies dysuria, frequency, urgency, bowel/bladder incontinence  MSK: +Back pain. denies joint swelling/pain, neck pain  Neuro: +Headache. denies dizziness, weakness, numbness  Psych: denies anxiety, depression, SI/HI, visual/auditory hallucinations

## 2022-01-21 NOTE — ED PROVIDER NOTE - CLINICAL SUMMARY MEDICAL DECISION MAKING FREE TEXT BOX
tripped on carpet hitting head and back. priority CT called found to have T12 compression fracture. spine recommended MR. HORTON called for pacemaker MR compataiblilaura. will obs

## 2022-01-21 NOTE — ED PROVIDER NOTE - PROGRESS NOTE DETAILS
spine called, requesting MR T/L; attempted to call [employer] rep [phone] but unable to speak with any rep. message left. will contact EP for pacemaker MR clearance

## 2022-01-21 NOTE — ED STATDOCS - PROGRESS NOTE DETAILS
Sole CATES for ED attending, Dr. Costa. 70 y/o female with PMHx of Cardiac pacemaker MICRA for Mobitz type 11, CVA with residual right sided weakness, DM, GERD, and HTN presents to ED s/p fall. Patient reports she was walking in her kitchen, tripped over the carpet, striking her head. Reports she had dizziness prior to the fall, and has been having multiple episodes of dizziness recently. Denies chest pain, shortness of breath. : Phuc. Will send to [facility] for further evaluation Sole CATES for ED attending, Dr. Costa. 72 y/o female with PMHx of Cardiac pacemaker MICRA for Mobitz type 2, CVA with residual right sided weakness, DM, GERD, and HTN presents to ED s/p fall. Patient reports she was walking in her kitchen, tripped over the carpet, striking her head. Reports she had dizziness prior to the fall, and has been having multiple episodes of dizziness recently. Denies chest pain, shortness of breath. : Phuc. Will send to [facility] for further evaluation

## 2022-01-21 NOTE — PROGRESS NOTE ADULT - ASSESSMENT
ASSESSMENT        PLAN ASSESSMENT  71F pmh CVA, pacemaker, HTN, HLD, DM presented after a fall with questionable dizziness preceding it. Denies LOC, admits to head strike & ASA use. Found to have T12 compression fracture, likely acute in nature, CTH neg. for ICH.      PLAN  - case d/w team, imaging reviewed  - recommend MRI T&L spine if pacemaker MRI compatible  - Bedrest until MRI results reviewed  - orthotist consulted for TLSO brace  - recommend syncope work up for possible etiology of fall as it did not sound mechanical  - will continue to follow

## 2022-01-21 NOTE — ED CDU PROVIDER INITIAL DAY NOTE - PHYSICAL EXAMINATION
Gen: No acute distress, non toxic  HEENT: NCAT, Mucous membranes moist, pink conjunctivae, EOMI  CV: RRR, nl s1/s2. pacemaker left chest  Resp: CTAB, normal rate and effort  GI: Abdomen soft, NT, ND. No rebound, no guarding  : No CVAT  Neuro: A&O x 3, residual right sided deficit and right facial droop. 4/5 str R UE and LE  MSK: No spine or joint tenderness to palpation  Skin: No rashes. intact and perfused. Gen: No acute distress, non toxic  HEENT: NCAT, Mucous membranes moist, pink conjunctivae, EOMI  CV: RRR, nl s1/s2. pacemaker left chest  Resp: CTAB, normal rate and effort  GI: Abdomen soft, NT, ND. No rebound, no guarding  : No CVAT  Neuro: A&O x 3, residual right sided deficit and right facial droop. 4/5 str RUE/RLE, 5/5 str LUE/LLE, ambulatory with limp  MSK: No spine or joint tenderness to palpation  Skin: No rashes. intact and perfused.

## 2022-01-21 NOTE — ED ADULT NURSE NOTE - NSIMPLEMENTINTERV_GEN_ALL_ED
Implemented All Fall with Harm Risk Interventions:  [place] to call system. Call bell, personal items and telephone within reach. Instruct patient to call for assistance. Room bathroom lighting operational. Non-slip footwear when patient is off stretcher. Physically safe environment: no spills, clutter or unnecessary equipment. Stretcher in lowest position, wheels locked, appropriate side rails in place. Provide visual cue, wrist band, yellow gown, etc. Monitor gait and stability. Monitor for mental status changes and reorient to person, place, and time. Review medications for side effects contributing to fall risk. Reinforce activity limits and safety measures with patient and family. Provide visual clues: red socks.

## 2022-01-21 NOTE — ED PROVIDER NOTE - NS ED ROS FT
ROS:  GEN: (-) fevers/chills  NECK: (-) stiffness, (-) swelling  RESP: (-) shortness of breath, (-) cough  CV: (-) chest pain, (-) palpitations  GI: (-) nausea, (-) vomiting, (-) pain  : (-) hematuria, (-) dysuria  EXT: (-) edema  NEURO: (-) weakness, (+) headache  MSK: (-) muscle pain, (+) back pain

## 2022-01-22 LAB
GLUCOSE BLDC GLUCOMTR-MCNC: 106 MG/DL — HIGH (ref 70–99)
GLUCOSE BLDC GLUCOMTR-MCNC: 153 MG/DL — HIGH (ref 70–99)
GLUCOSE BLDC GLUCOMTR-MCNC: 201 MG/DL — HIGH (ref 70–99)
GLUCOSE BLDC GLUCOMTR-MCNC: 223 MG/DL — HIGH (ref 70–99)

## 2022-01-22 PROCEDURE — 99225: CPT

## 2022-01-22 PROCEDURE — 99226: CPT

## 2022-01-22 RX ORDER — ENOXAPARIN SODIUM 100 MG/ML
40 INJECTION SUBCUTANEOUS DAILY
Refills: 0 | Status: DISCONTINUED | OUTPATIENT
Start: 2022-01-22 | End: 2022-01-23

## 2022-01-22 RX ADMIN — FAMOTIDINE 20 MILLIGRAM(S): 10 INJECTION INTRAVENOUS at 11:35

## 2022-01-22 RX ADMIN — RANOLAZINE 500 MILLIGRAM(S): 500 TABLET, FILM COATED, EXTENDED RELEASE ORAL at 05:43

## 2022-01-22 RX ADMIN — LIDOCAINE 1 PATCH: 4 CREAM TOPICAL at 08:40

## 2022-01-22 RX ADMIN — Medication 0.1 MILLIGRAM(S): at 17:37

## 2022-01-22 RX ADMIN — Medication 81 MILLIGRAM(S): at 11:34

## 2022-01-22 RX ADMIN — Medication 650 MILLIGRAM(S): at 21:02

## 2022-01-22 RX ADMIN — METFORMIN HYDROCHLORIDE 1000 MILLIGRAM(S): 850 TABLET ORAL at 05:41

## 2022-01-22 RX ADMIN — Medication 100 MILLIGRAM(S): at 05:42

## 2022-01-22 RX ADMIN — Medication 650 MILLIGRAM(S): at 20:02

## 2022-01-22 RX ADMIN — INSULIN GLARGINE 20 UNIT(S): 100 INJECTION, SOLUTION SUBCUTANEOUS at 22:07

## 2022-01-22 RX ADMIN — Medication 1: at 12:25

## 2022-01-22 RX ADMIN — CLOPIDOGREL BISULFATE 75 MILLIGRAM(S): 75 TABLET, FILM COATED ORAL at 11:34

## 2022-01-22 RX ADMIN — Medication 2: at 16:58

## 2022-01-22 RX ADMIN — LIDOCAINE 1 PATCH: 4 CREAM TOPICAL at 10:03

## 2022-01-22 RX ADMIN — RANOLAZINE 500 MILLIGRAM(S): 500 TABLET, FILM COATED, EXTENDED RELEASE ORAL at 17:37

## 2022-01-22 RX ADMIN — Medication 30 MILLILITER(S): at 20:01

## 2022-01-22 RX ADMIN — GABAPENTIN 300 MILLIGRAM(S): 400 CAPSULE ORAL at 22:07

## 2022-01-22 RX ADMIN — LIDOCAINE 1 PATCH: 4 CREAM TOPICAL at 22:07

## 2022-01-22 RX ADMIN — METFORMIN HYDROCHLORIDE 1000 MILLIGRAM(S): 850 TABLET ORAL at 17:37

## 2022-01-22 RX ADMIN — ENOXAPARIN SODIUM 40 MILLIGRAM(S): 100 INJECTION SUBCUTANEOUS at 15:35

## 2022-01-22 RX ADMIN — ATORVASTATIN CALCIUM 80 MILLIGRAM(S): 80 TABLET, FILM COATED ORAL at 22:08

## 2022-01-22 NOTE — PROGRESS NOTE ADULT - ASSESSMENT
This is a 71 year old right hand dominant female with a past medical history significant for pacemaker placement, status post fall, with a T12 fracture.    1. MRI if pacemaker is compatible   2. Bedrest at this time   3. TLSO was ordered, patient must wear if out of bed   4. Continue neuro checks  5. Plan was discussed with Dr Vieyra

## 2022-01-22 NOTE — PROGRESS NOTE ADULT - SUBJECTIVE AND OBJECTIVE BOX
Neurosurgery LILA  Daily note     This is a 71 year old right hand dominant female with a past medical history significant for hypertension, hyperlipidemia, DM, CVA, cardiac pacemaker placement, who presented to [facility], status post fall, with a T 12 fracture, currently without complaints. Patient is in bed, this morning, awaiting a MRI.     Vital Signs Last 24 Hrs  T(C): 37.1 (2022 11:55), Max: 37.1 (2022 11:55)  T(F): 98.7 (2022 11:55), Max: 98.7 (2022 11:55)  HR: 63 (2022 11:55) (63 - 74)  BP: 195/93 (2022 11:55) (150/76 - 195/93)  RR: 18 (2022 11:55) (18 - 20)  SpO2: 97% (2022 11:55) (95% - 97%)    PHYSICAL EXAM:  GENERAL: NAD, well-groomed, well-developed  HEAD:  Atraumatic, normocephalic  MENTAL STATUS: A A O x 3, Appropriately conversant in Wolof, following simple commands   CRANIAL NERVES: PERRL. EOMI without nystagmus. Tongue midline. Hearing grossly intact. Speech clear. Head turning and shoulder shrug intact.   REFLEXES: No pronator drift; DTRs 2+ intact and symmetric; negative Carlson's b/l; negative clonus b/l  MOTOR: strength 5/5 b/l upper and lower extremities  SENSATION: grossly intact to light touch all extremities  CHEST/LUNG: Clear to auscultation bilaterally  HEART: +S1/+S2  ABDOMEN: Soft, nontender, nondistended  EXTREMITIES:  2+ peripheral pulses  SKIN: Warm, dry; no rashes or lesions    LABS:                        12.8   14.16 )-----------( 386      ( 2022 12:49 )             37.8     01-    136  |  98  |  22.0<H>  ----------------------------<  244<H>  4.2   |  24.0  |  0.87    Ca    9.5      2022 12:49    PT/INR - ( 2022 12:49 )   PT: 12.9 sec;   INR: 1.12 ratio      PTT - ( 2022 12:49 )  PTT:36.2 sec  Urinalysis Basic - ( 2022 17:01 )    Color: Yellow / Appearance: Clear / S.005 / pH: x  Gluc: x / Ketone: Negative  / Bili: Negative / Urobili: Negative mg/dL   Blood: x / Protein: 15 / Nitrite: Negative   Leuk Esterase: Small / RBC: 0-2 /HPF / WBC 0-2 /HPF   Sq Epi: x / Non Sq Epi: Occasional / Bacteria: Occasional    RADIOLOGY & ADDITIONAL TESTS:  ACC: [id number] EXAM:  CT LUMBAR SPINE                        PROCEDURE DATE:  2022    INTERPRETATION:  CLINICAL INDICATION: Fall.  TECHNIQUE: CT of the lumbar spine was performed without the   administration of intravenous contrast, according to standard protocol.    COMPARISON: CT abdomen and pelvis 2018.    FINDINGS:  BONES AND DISCS:  General osteopenia is noted.    Mild inferior compression fracture of T12, likely acute. No bony   retropulsion.    No evidence of large disc protrusion or critical spinal stenosis is   noted. MRI may be obtained, if the patient is MRI compatible, if further   information regarding spinal canal soft tissue contents is required.    SOFT TISSUES:  Paraspinal soft tissues are unremarkable.    OTHER:  Hyperdense material layering within the gallbladder, which may represent   vicarious excretion of contrast material or hyperdense gallbladder sludge.    IMPRESSION:  -Mild inferior T12 compression fracture, likely acute.  --- End of Report ---

## 2022-01-22 NOTE — ED ADULT NURSE REASSESSMENT NOTE - NSIMPLEMENTINTERV_GEN_ALL_ED
Implemented All Fall with Harm Risk Interventions:  [place] to call system. Call bell, personal items and telephone within reach. Instruct patient to call for assistance. Room bathroom lighting operational. Non-slip footwear when patient is off stretcher. Physically safe environment: no spills, clutter or unnecessary equipment. Stretcher in lowest position, wheels locked, appropriate side rails in place. Provide visual cue, wrist band, yellow gown, etc. Monitor gait and stability. Monitor for mental status changes and reorient to person, place, and time. Review medications for side effects contributing to fall risk. Reinforce activity limits and safety measures with patient and family. Provide visual clues: red socks.
Implemented All Fall Risk Interventions:  [place] to call system. Call bell, personal items and telephone within reach. Instruct patient to call for assistance. Room bathroom lighting operational. Non-slip footwear when patient is off stretcher. Physically safe environment: no spills, clutter or unnecessary equipment. Stretcher in lowest position, wheels locked, appropriate side rails in place. Provide visual cue, wrist band, yellow gown, etc. Monitor gait and stability. Monitor for mental status changes and reorient to person, place, and time. Review medications for side effects contributing to fall risk. Reinforce activity limits and safety measures with patient and family.

## 2022-01-23 VITALS
RESPIRATION RATE: 18 BRPM | OXYGEN SATURATION: 98 % | HEART RATE: 67 BPM | SYSTOLIC BLOOD PRESSURE: 191 MMHG | DIASTOLIC BLOOD PRESSURE: 85 MMHG | TEMPERATURE: 98 F

## 2022-01-23 LAB
ALBUMIN SERPL ELPH-MCNC: 4 G/DL — SIGNIFICANT CHANGE UP (ref 3.3–5.2)
ALP SERPL-CCNC: 75 U/L — SIGNIFICANT CHANGE UP (ref 40–120)
ALT FLD-CCNC: 24 U/L — SIGNIFICANT CHANGE UP
ANION GAP SERPL CALC-SCNC: 13 MMOL/L — SIGNIFICANT CHANGE UP (ref 5–17)
AST SERPL-CCNC: 19 U/L — SIGNIFICANT CHANGE UP
BASOPHILS # BLD AUTO: 0.07 K/UL — SIGNIFICANT CHANGE UP (ref 0–0.2)
BASOPHILS NFR BLD AUTO: 0.6 % — SIGNIFICANT CHANGE UP (ref 0–2)
BILIRUB SERPL-MCNC: 0.7 MG/DL — SIGNIFICANT CHANGE UP (ref 0.4–2)
BUN SERPL-MCNC: 22.7 MG/DL — HIGH (ref 8–20)
CALCIUM SERPL-MCNC: 9.2 MG/DL — SIGNIFICANT CHANGE UP (ref 8.6–10.2)
CHLORIDE SERPL-SCNC: 98 MMOL/L — SIGNIFICANT CHANGE UP (ref 98–107)
CO2 SERPL-SCNC: 23 MMOL/L — SIGNIFICANT CHANGE UP (ref 22–29)
CREAT SERPL-MCNC: 0.7 MG/DL — SIGNIFICANT CHANGE UP (ref 0.5–1.3)
EOSINOPHIL # BLD AUTO: 0.11 K/UL — SIGNIFICANT CHANGE UP (ref 0–0.5)
EOSINOPHIL NFR BLD AUTO: 1 % — SIGNIFICANT CHANGE UP (ref 0–6)
GLUCOSE BLDC GLUCOMTR-MCNC: 147 MG/DL — HIGH (ref 70–99)
GLUCOSE BLDC GLUCOMTR-MCNC: 177 MG/DL — HIGH (ref 70–99)
GLUCOSE BLDC GLUCOMTR-MCNC: 193 MG/DL — HIGH (ref 70–99)
GLUCOSE SERPL-MCNC: 158 MG/DL — HIGH (ref 70–99)
HCT VFR BLD CALC: 36.2 % — SIGNIFICANT CHANGE UP (ref 34.5–45)
HGB BLD-MCNC: 12.4 G/DL — SIGNIFICANT CHANGE UP (ref 11.5–15.5)
IMM GRANULOCYTES NFR BLD AUTO: 1.2 % — SIGNIFICANT CHANGE UP (ref 0–1.5)
LYMPHOCYTES # BLD AUTO: 29.7 % — SIGNIFICANT CHANGE UP (ref 13–44)
LYMPHOCYTES # BLD AUTO: 3.25 K/UL — SIGNIFICANT CHANGE UP (ref 1–3.3)
MCHC RBC-ENTMCNC: 28.6 PG — SIGNIFICANT CHANGE UP (ref 27–34)
MCHC RBC-ENTMCNC: 34.3 GM/DL — SIGNIFICANT CHANGE UP (ref 32–36)
MCV RBC AUTO: 83.6 FL — SIGNIFICANT CHANGE UP (ref 80–100)
MONOCYTES # BLD AUTO: 1.04 K/UL — HIGH (ref 0–0.9)
MONOCYTES NFR BLD AUTO: 9.5 % — SIGNIFICANT CHANGE UP (ref 2–14)
NEUTROPHILS # BLD AUTO: 6.36 K/UL — SIGNIFICANT CHANGE UP (ref 1.8–7.4)
NEUTROPHILS NFR BLD AUTO: 58 % — SIGNIFICANT CHANGE UP (ref 43–77)
PLATELET # BLD AUTO: 377 K/UL — SIGNIFICANT CHANGE UP (ref 150–400)
POTASSIUM SERPL-MCNC: 4.3 MMOL/L — SIGNIFICANT CHANGE UP (ref 3.5–5.3)
POTASSIUM SERPL-SCNC: 4.3 MMOL/L — SIGNIFICANT CHANGE UP (ref 3.5–5.3)
PROT SERPL-MCNC: 6.5 G/DL — LOW (ref 6.6–8.7)
RBC # BLD: 4.33 M/UL — SIGNIFICANT CHANGE UP (ref 3.8–5.2)
RBC # FLD: 14.6 % — HIGH (ref 10.3–14.5)
SODIUM SERPL-SCNC: 134 MMOL/L — LOW (ref 135–145)
WBC # BLD: 10.96 K/UL — HIGH (ref 3.8–10.5)
WBC # FLD AUTO: 10.96 K/UL — HIGH (ref 3.8–10.5)

## 2022-01-23 PROCEDURE — 71045 X-RAY EXAM CHEST 1 VIEW: CPT

## 2022-01-23 PROCEDURE — 72131 CT LUMBAR SPINE W/O DYE: CPT | Mod: MG

## 2022-01-23 PROCEDURE — 99226: CPT

## 2022-01-23 PROCEDURE — 72148 MRI LUMBAR SPINE W/O DYE: CPT | Mod: 26,MA

## 2022-01-23 PROCEDURE — 86901 BLOOD TYPING SEROLOGIC RH(D): CPT

## 2022-01-23 PROCEDURE — 85730 THROMBOPLASTIN TIME PARTIAL: CPT

## 2022-01-23 PROCEDURE — 86900 BLOOD TYPING SEROLOGIC ABO: CPT

## 2022-01-23 PROCEDURE — 36415 COLL VENOUS BLD VENIPUNCTURE: CPT

## 2022-01-23 PROCEDURE — 86850 RBC ANTIBODY SCREEN: CPT

## 2022-01-23 PROCEDURE — 80053 COMPREHEN METABOLIC PANEL: CPT

## 2022-01-23 PROCEDURE — 99285 EMERGENCY DEPT VISIT HI MDM: CPT | Mod: 25

## 2022-01-23 PROCEDURE — 85610 PROTHROMBIN TIME: CPT

## 2022-01-23 PROCEDURE — G1004: CPT

## 2022-01-23 PROCEDURE — 72146 MRI CHEST SPINE W/O DYE: CPT | Mod: MA

## 2022-01-23 PROCEDURE — 70450 CT HEAD/BRAIN W/O DYE: CPT | Mod: MG

## 2022-01-23 PROCEDURE — 72192 CT PELVIS W/O DYE: CPT | Mod: MG

## 2022-01-23 PROCEDURE — 72125 CT NECK SPINE W/O DYE: CPT | Mod: MG

## 2022-01-23 PROCEDURE — 81001 URINALYSIS AUTO W/SCOPE: CPT

## 2022-01-23 PROCEDURE — 72148 MRI LUMBAR SPINE W/O DYE: CPT | Mod: MA

## 2022-01-23 PROCEDURE — 93288 INTERROG EVL PM/LDLS PM IP: CPT

## 2022-01-23 PROCEDURE — 72146 MRI CHEST SPINE W/O DYE: CPT | Mod: 26,MA

## 2022-01-23 PROCEDURE — 85025 COMPLETE CBC W/AUTO DIFF WBC: CPT

## 2022-01-23 PROCEDURE — T1013: CPT

## 2022-01-23 PROCEDURE — 82962 GLUCOSE BLOOD TEST: CPT

## 2022-01-23 PROCEDURE — 80048 BASIC METABOLIC PNL TOTAL CA: CPT

## 2022-01-23 PROCEDURE — G0378: CPT

## 2022-01-23 PROCEDURE — 99217: CPT

## 2022-01-23 RX ADMIN — METFORMIN HYDROCHLORIDE 1000 MILLIGRAM(S): 850 TABLET ORAL at 09:17

## 2022-01-23 RX ADMIN — CLOPIDOGREL BISULFATE 75 MILLIGRAM(S): 75 TABLET, FILM COATED ORAL at 11:46

## 2022-01-23 RX ADMIN — Medication 650 MILLIGRAM(S): at 11:47

## 2022-01-23 RX ADMIN — LIDOCAINE 1 PATCH: 4 CREAM TOPICAL at 09:28

## 2022-01-23 RX ADMIN — RANOLAZINE 500 MILLIGRAM(S): 500 TABLET, FILM COATED, EXTENDED RELEASE ORAL at 05:28

## 2022-01-23 RX ADMIN — Medication 81 MILLIGRAM(S): at 11:46

## 2022-01-23 RX ADMIN — Medication 100 MILLIGRAM(S): at 05:28

## 2022-01-23 RX ADMIN — Medication 1: at 11:46

## 2022-01-23 RX ADMIN — FAMOTIDINE 20 MILLIGRAM(S): 10 INJECTION INTRAVENOUS at 11:47

## 2022-01-23 NOTE — ED ADULT NURSE REASSESSMENT NOTE - NS ED NURSE REASSESS COMMENT FT1
Pt to have MRI at 8am 1/23 per PA. plan of care discussed with patient with Ed  Idris at bedside.
Pt A&Ox4. VSS afebrile. Patient in understanding of plan of care. Patient with no further questions for the RN. Resting in comfort. Call bell within reach and encouraged to use when assistance needed. Will continue to monitor.
Assumed care of the patient @2530. Pt A&Ox4. VSS afebrile. Pt denies pain at this time. Pt awaiting MRI. Patient in understanding of plan of care. Patient with no further questions for the RN. Resting in comfort. Call bell within reach and encouraged to use when assistance needed. Will continue to monitor.
Assumed care of patient from FLORENCE Wilkerson at 730, alert and oriented x4, resting comfortably in bed at this time. No s/s of distress noted. Pt currently awaiting company to come place pacemaker in MRI mode per previous nurse report. Saftey maintained.
Received patient from [facility] RN.  Pt AxO4, VSS.  Pt denies chest pain/SOB at this time.  Cardio NSR on cardiac monitor.  left IV insertion site  intact-, flushing without difficulty. No s/s of hyper/hypoglycemia  Safety measures taken, bed in low position, call bell within reach, side rails up x2.  Plan of care explained.  Pt verbalized understanding.  Will continue to monitor. MRI pending in AM
Assumed care of the patient at 1530. Verbal report received from Afshin HENRIQUEZ ED. Patient transferred to observation unit CDU 7R. Patient A&Ox4, Scottish speaking only, Rayo  at the bedside for translation. Patient with persistent pain to Right hip and right lower back. Denies any numbness, tingling or loss of sensation. Primafit in place draining clear yellow urine. Patient with right sided weakness, per pt residual from recent stroke. PIV patent. FS AC HS. Patient pending MRI testing and spine consult. Patient in understanding of plan of care. Patient with no further questions for the RN. Resting in comfort. Call bell within reach and encouraged to use when assistance needed. Will continue to monitor.
Pt received from day shift RN at 1930. Pt is A&Ox4. Primarily Kiswahili speaking, ED Korean interpretor at bedside for assistance with communication. Pt go to for MRI in AM. Resting comfortably in stretcher at this time. Fall and safety precautions maintained. Call bell within reach.

## 2022-01-23 NOTE — ED CDU PROVIDER DISPOSITION NOTE - NSFOLLOWUPINSTRUCTIONS_ED_ALL_ED_FT
- Follow up with your doctor within 2-3 days.   - Return to the ED for any new or worsening symptoms.   - Please wear brace whenever walking. Use rolling walker to ambulate.   - Follow-up with neurosurgery doctor within 2-4 weeks    - Seguimiento con johnson médico dentro de 2-3 días.  - Regrese al servicio de urgencias por cualquier síntoma nuevo o que empeore.  - Por favor, use aparatos ortopédicos cada vez que camine. Use un andador rodante para deambular.  - Seguimiento con un médico de neurocirugía dentro de 2-4 semanas - Follow up with your doctor within 2-3 days.   - Return to the ED for any new or worsening symptoms.   - Please wear brace whenever walking. Use rolling walker to ambulate.   - Follow-up with neurosurgery doctor within 2-4 weeks    - Seguimiento con johnson médico dentro de 2-3 días.  - Regrese al servicio de urgencias por cualquier síntoma nuevo o que empeore.  - Por favor, use aparatos ortopédicos cada vez que camine. Use un andador rodante para deambular.  - Seguimiento con un médico de neurocirugía dentro de 2-4 semanas.

## 2022-01-23 NOTE — ED CDU PROVIDER SUBSEQUENT DAY NOTE - ATTENDING CONTRIBUTION TO CARE
I performed a face to face history and physical exam of the patient and discussed their management with the student/resident/ACP. I reviewed the student/resident/ACP's note and agree with the documented findings and plan of care.    Pt with T12 fx pending MRI.
I performed a face to face history and physical exam of the patient and discussed their management with the student/resident/ACP. I reviewed the student/resident/ACP's note and agree with the documented findings and plan of care.    Pt pending MRIs.  no current complaints.

## 2022-01-23 NOTE — PROGRESS NOTE ADULT - ASSESSMENT
This is a 71 year old right hand dominant female with a past medical history significant for HTN, HLD, DM, CVA, cardiac pacemaker placement, s/post fall with a T 12 fracture.    1. Patient must wear brace anytime she's out of bed or going for testing   2. Continue neuro checks   3. Tylenol for pain   4. Patient needs to follow up with Dr Onofre Vieyra in 2 weeks with TLSO brace on. Office number is [phone]  5. Plan was discussed with Dr Vieyra

## 2022-01-23 NOTE — ED ADULT NURSE REASSESSMENT NOTE - NURSING NEURO ORIENTATION
oriented to person, place and time

## 2022-01-23 NOTE — ED CDU PROVIDER DISPOSITION NOTE - CLINICAL COURSE
70yo F presenting c/o back pain s/p mechanical trip and fall yesterday. no precipitating symptoms prior to fall. ct showed "Mild inferior T12 compression fracture". Pt put in obs for MRI T/L spine which demonstrated "Acute mild T12 inferior endplate compression fracture. Acute nondisplaced sacrococcygeal fracture." Pt ambulated well with physical therapy. Has TLSO brace fitted by orthotist. PT recommending home with walker. pt has help at home and has rolling walker already. instructed to f/u with neurosurgery within 2-4 weeks.

## 2022-01-23 NOTE — PROVIDER CONTACT NOTE (OTHER) - SITUATION
Chart reviewed, contents noted. PT spoke with PA re: MRi spine. PA requesting PT held until MRI performed and read and TLSO arrived. TLSO to be worm when OOB.
Chart reviewed, contents noted. MD order for PT eval received. Spoke with ALEXIS Schmitz prior to eval. Pt. to have TLSO when OOB.

## 2022-01-23 NOTE — PHYSICAL THERAPY INITIAL EVALUATION ADULT - ADDITIONAL COMMENTS
Pt. reports she lives in a house with her  and son who are at home and able to assist. Pt. without stairs to enter and no stairs inside. Pt. was independent prior to admission and owns a RW.

## 2022-01-23 NOTE — ED CDU PROVIDER DISPOSITION NOTE - PATIENT PORTAL LINK FT
You can access the FollowMyHealth Patient Portal offered by [facility] by registering at the following website: [email or web address]. By joining [employer]’s FollowMyHealth portal, you will also be able to view your health information using other applications (apps) compatible with our system.

## 2022-01-23 NOTE — ED CDU PROVIDER DISPOSITION NOTE - ATTENDING CONTRIBUTION TO CARE
I performed a face to face history and physical exam of the patient and discussed their management with the student/resident/ACP. I reviewed the student/resident/ACP's note and agree with the documented findings and plan of care.    imaging reviewed.  Pt with acute compression fx.  no spinal cord involvement. Pt evaluated by PT and cleared to go home.  will d/c with outpatient f/up. given return instructions.  spoke with [employer] rep who confirmed pm returned to regular pacing mode.

## 2022-01-23 NOTE — ED ADULT NURSE REASSESSMENT NOTE - NURSING MUSC LEG STRENGTH RIGHT
deficit from prior CVA/extension weak/flexion weak
from previous stroke per patient./extension weak/flexion weak
extension weak/flexion weak
previous stroke per patient./extension weak/flexion weak

## 2022-01-23 NOTE — ED ADULT NURSE REASSESSMENT NOTE - NURSING MUSC JOINTS
no pain, swelling or deformity of joints
Rt lower back and rt buttock pain/yes (describe)

## 2022-01-23 NOTE — PHYSICAL THERAPY INITIAL EVALUATION ADULT - PERTINENT HX OF CURRENT PROBLEM, REHAB EVAL
70yo F presenting c/o back pain s/p mechanical trip and fall yesterday. no precipitating symptoms prior to fall. ct showed "Mild inferior T12 compression fracture". Pt put in obs for MRI T/L spine which demonstrated "Acute mild T12 inferior endplate compression fracture. Acute nondisplaced sacrococcygeal fracture."

## 2022-01-23 NOTE — ED CDU PROVIDER SUBSEQUENT DAY NOTE - PROGRESS NOTE DETAILS
mri shows "Acute mild T12 inferior endplate compression fracture. Acute nondisplaced sacrococcygeal fracture." pt has TLSO. pending PT consult
Pt received in signout, pt assessed at bedside with ED  Idris Reyes    Pt reports improvement in back pain  MRI is scheduled for tomorrow morning

## 2022-01-23 NOTE — PROVIDER CONTACT NOTE (OTHER) - BACKGROUND
PT requested orders placed for use of TLSO. Will follow up pending MRi and delivery of brace.
Evaluation completed - see chart. Pt. left OOB with TLSO. Language line  #[id number] utilized for Togolese.

## 2022-01-23 NOTE — ED CDU PROVIDER DISPOSITION NOTE - CARE PROVIDER_API CALL
Tereso Vieyra; PhD)  Neurosurgery  [address] [place] [address]  Phone: [phone]  Fax: [phone]  Follow Up Time:

## 2022-01-23 NOTE — ED ADULT NURSE REASSESSMENT NOTE - GENERAL PATIENT STATE
comfortable appearance/cooperative
comfortable appearance/cooperative
comfortable appearance
comfortable appearance/no change observed
comfortable appearance/cooperative

## 2022-01-23 NOTE — PROGRESS NOTE ADULT - SUBJECTIVE AND OBJECTIVE BOX
Neurosurgery LILA  Daily note     This is a 71 year old right hand dominant female with a past medical history significant for HTN, HLD, DM, CVA, cardiac pacemaker placement, who presented to [facility] status post fall, and sustained a T 12 fracture. Patient is resting in her bed and TLSO brace is visualized.     INTERVAL HPI OVERNIGHT EVENTS:  71 year old right hand dominant female s/post fall with a T12 compression fracture. Patient denies headache, weakness, numbness, n/v/d, fevers, chills, chest pain, SOB.     Vital Signs Last 24 Hrs  T(C): 36.7 (2022 11:33), Max: 36.8 (2022 19:26)  T(F): 98.1 (2022 11:33), Max: 98.3 (2022 19:26)  HR: 67 (2022 11:33) (61 - 75)  BP: 191/85 (2022 11:33) (130/84 - 191/85)  RR: 18 (2022 11:33) (17 - 18)  SpO2: 98% (2022 11:33) (96% - 99%)    PHYSICAL EXAM:  GENERAL: NAD, well-groomed, well-developed  HEAD:  Atraumatic, normocephalic  MENTAL STATUS: A A O x 3, Appropriately conversant, following simple commands  CRANIAL NERVES: PERRL. EOMI without nystagmus. Face symmetric w/ normal eye closure and smile, tongue midline. Hearing grossly intact. Speech clear. Head turning and shoulder shrug intact.   REFLEXES: negative clonus b/l  MOTOR: strength moving all extremities with good strength   SENSATION: grossly intact to light touch all extremities  CHEST/LUNG: Clear to auscultation bilaterally  HEART: +S1/+S2  ABDOMEN: Soft, nontender, nondistended  EXTREMITIES:  2+ peripheral pulses  SKIN: Warm, dry; no rashes or lesions    LABS:                        12.4   10.96 )-----------( 377      ( 2022 05:45 )             36.2         134<L>  |  98  |  22.7<H>  ----------------------------<  158<H>  4.3   |  23.0  |  0.70    Ca    9.2      2022 05:45    TPro  6.5<L>  /  Alb  4.0  /  TBili  0.7  /  DBili  x   /  AST  19  /  ALT  24  /  AlkPhos  75        Urinalysis Basic - ( 2022 17:01 )    Color: Yellow / Appearance: Clear / S.005 / pH: x  Gluc: x / Ketone: Negative  / Bili: Negative / Urobili: Negative mg/dL   Blood: x / Protein: 15 / Nitrite: Negative   Leuk Esterase: Small / RBC: 0-2 /HPF / WBC 0-2 /HPF   Sq Epi: x / Non Sq Epi: Occasional / Bacteria: Occasional     @ 07:01  -   @ 07:00  --------------------------------------------------------  IN: 0 mL / OUT: 700 mL / NET: -700 mL    RADIOLOGY & ADDITIONAL TESTS:  PROCEDURE DATE:  2022    INTERPRETATION:  CLINICAL INDICATIONS: T12 compression fracture  COMPARISON: CT lumbar spine dated 2022.  TECHNIQUE: Thoracic and lumbar spine MRI: Multiplanar, multisequence MR   images of the thoracic and lumbar spine without the administration of   intravenous gadolinium.    FINDINGS:  Acute mild T12 inferior endplate compression fracture. 10% height loss   anteriorly. No fracture fragment retropulsion. Visualized spinal cord is   normal caliber and signal intensity. Remaining thoracic vertebral body   heights are maintained.    Tiny midline disc protrusion at T1/T2. Tiny right of midline disc   protrusion at T5/T6.    Conus medullaris ends at the T12/L1 level. Visualized SI joints are   symmetric.    Bone marrow edema involving the sacral coccyx on image 7 of series 5   suggesting an acute nondisplaced fracture.    Evaluation of the individual levels:  L1/L2 level: Small right of midline disc herniation. No spinal canal   stenosis or foraminal narrowing.  L2/L3 level: No disc herniation, spinal canal stenosis, foraminal   narrowing.  L3/L4 level: No disc herniation, spinal canal stenosis, foraminal   narrowing.  L4/L5 level: No disc herniation, spinal canal stenosis, foraminal   narrowing.  L5/S1 level: Broad-based midline disc herniation. No spinal canal   stenosis or foraminal narrowing.    IMPRESSION: Acute mild T12 inferior endplate compression fracture. Acute   nondisplaced sacrococcygeal fracture.

## 2022-01-23 NOTE — ED ADULT NURSE REASSESSMENT NOTE - NURSING MUSC EXTREMITY LIMITED ROM
right lower extremity
residual from stroke per pt/right upper extremity/right lower extremity

## 2022-01-23 NOTE — ED CDU PROVIDER SUBSEQUENT DAY NOTE - NSICDXPASTMEDICALHX_GEN_ALL_CORE_FT
PAST MEDICAL HISTORY:  Cardiac pacemaker MI[employer] for mobitz type 11    CVA (cerebrovascular accident) resdiual right sided weakness    DM (diabetes mellitus)     H/O gastroesophageal reflux (GERD)     HTN (hypertension)     
PAST MEDICAL HISTORY:  Cardiac pacemaker MI[employer] for mobitz type 11    CVA (cerebrovascular accident) resdiual right sided weakness    DM (diabetes mellitus)     H/O gastroesophageal reflux (GERD)     HTN (hypertension)

## 2022-01-23 NOTE — ED CDU PROVIDER SUBSEQUENT DAY NOTE - MEDICAL DECISION MAKING DETAILS
70yo F presenting c/o back pain s/p mechanical trip and fall yesterday. CT shows "Mild inferior T12 compression fracture" seen by neurosurgery, placed in obs for MRI T/L spine.
70yo F presenting c/o back pain s/p mechanical trip and fall yesterday. CT shows "Mild inferior T12 compression fracture" seen by neurosurgery, placed in obs for MRI T/L spine.

## 2022-01-23 NOTE — ED CDU PROVIDER SUBSEQUENT DAY NOTE - NSICDXPASTSURGICALHX_GEN_ALL_CORE_FT
PAST SURGICAL HISTORY:  History of benign brain tumor     
PAST SURGICAL HISTORY:  History of benign brain tumor

## 2022-01-23 NOTE — ED ADULT NURSE REASSESSMENT NOTE - COMFORT CARE
meal provided/plan of care explained/wait time explained/warm blanket provided
meal provided/plan of care explained/repositioned/wait time explained
darkened lights/plan of care explained/po fluids offered/wait time explained/warm blanket provided
plan of care explained
darkened lights/plan of care explained/po fluids offered/warm blanket provided

## 2022-01-23 NOTE — PROVIDER CONTACT NOTE (OTHER) - ACTION/TREATMENT ORDERED:
Short term goals (2-3 sessions): bed mobility: supervision, sit to stand: with RW supervision, Gait: Amb 300 ft with RW supervision

## 2022-01-23 NOTE — ED CDU PROVIDER SUBSEQUENT DAY NOTE - NS ED ROS FT
Gen: denies fever, chills, fatigue, weight loss  Skin: denies rashes, laceration, bruising  HEENT: denies visual changes, ear pain, nasal congestion, throat pain  Respiratory: denies RAMIREZ, SOB, cough, wheezing  Cardiovascular: denies chest pain, palpitations, diaphoresis, LE edema  GI: denies abdominal pain, n/v/d  : denies dysuria, frequency, urgency, bowel/bladder incontinence  MSK: +Back pain. denies joint swelling/pain, neck pain  Neuro: denies dizziness, weakness, numbness
Gen: denies fever, chills, fatigue, weight loss  Skin: denies rashes, laceration, bruising  HEENT: denies visual changes, ear pain, nasal congestion, throat pain  Respiratory: denies RAMIREZ, SOB, cough, wheezing  Cardiovascular: denies chest pain, palpitations, diaphoresis, LE edema  GI: denies abdominal pain, n/v/d  : denies dysuria, frequency, urgency, bowel/bladder incontinence  MSK: +Back pain. denies joint swelling/pain, neck pain  Neuro: denies dizziness, weakness, numbness

## 2022-01-23 NOTE — ED ADULT NURSE REASSESSMENT NOTE - NURSING NEURO LEVEL OF CONSCIOUSNESS
alert and awake
alert and awake/follows commands
alert and awake
alert and awake/follows commands
alert and awake/follows commands

## 2022-01-23 NOTE — ED ADULT NURSE REASSESSMENT NOTE - NURSING MUSC ROM
- - -
- - -
full range of motion in all extremities
- - -
full range of motion in all extremities
- - -
full range of motion in all extremities

## 2022-01-23 NOTE — ED ADULT NURSE REASSESSMENT NOTE - NEURO SENSATION
sensory intact

## 2022-01-23 NOTE — ED ADULT NURSE REASSESSMENT NOTE - NURSING MUSC EXTREMITY NORMAL ROM
left upper extremity/right upper extremity/left lower extremity
left upper extremity/right upper extremity/left lower extremity
left upper extremity/right upper extremity/left lower extremity/right lower extremity
left upper extremity/right upper extremity/left lower extremity
left upper extremity/right upper extremity/left lower extremity
left upper extremity

## 2022-01-23 NOTE — ED CDU PROVIDER SUBSEQUENT DAY NOTE - PHYSICAL EXAMINATION
Vital signs noted, see flowsheet.  General: NAD, well appearing and non-toxic.  HEENT: NC/AT. MMM. Conjunctiva and sclera clear b/l.  EOMI. PERRL.  Neck: Soft and supple, full ROM   Cardiac: RRR. +S1/S2. Peripheral pulses 2+ and symmetric b/l. No LE edema.  Respiratory: Speaking in full sentences, no evidence of respiratory distress. Lungs CTA b/l, no wheezes/rhonchi/rales/stridor.   Abdomen: BSx4. Soft, NTND. No guarding or rebound tenderness. No suprapubic tenderness.  Back: Spine midline and non-tender. No CVAT.  Neuro: A&O x 3, residual right sided deficit and right facial droop. 4/5 strength RUE/RLE, 5/5 strength LUE/LLE  Skin: No rashes. intact and perfused.
Vital signs noted, see flowsheet.  General: NAD, well appearing and non-toxic.  HEENT: NC/AT. MMM. Conjunctiva and sclera clear b/l.  EOMI. PERRL.  Neck: Soft and supple, full ROM   Cardiac: RRR. +S1/S2. Peripheral pulses 2+ and symmetric b/l. No LE edema.  Respiratory: Speaking in full sentences, no evidence of respiratory distress. Lungs CTA b/l, no wheezes/rhonchi/rales/stridor.   Abdomen: BSx4. Soft, NTND. No guarding or rebound tenderness. No suprapubic tenderness.  Back: Spine midline and non-tender. No CVAT.  Neuro: A&O x 3, residual right sided deficit and right facial droop. 4/5 strength RUE/RLE, 5/5 strength LUE/LLE  Skin: No rashes. intact and perfused.

## 2022-01-23 NOTE — ED ADULT NURSE REASSESSMENT NOTE - NEURO GAIT
requires assistance
bedrest

## 2022-02-08 ENCOUNTER — APPOINTMENT (OUTPATIENT)
Dept: NEUROSURGERY | Facility: CLINIC | Age: 72
End: 2022-02-08

## 2022-03-11 ENCOUNTER — APPOINTMENT (OUTPATIENT)
Dept: UROGYNECOLOGY | Facility: CLINIC | Age: 72
End: 2022-03-11
Payer: MEDICARE

## 2022-03-11 VITALS
HEART RATE: 76 BPM | WEIGHT: 133 LBS | TEMPERATURE: 97.4 F | BODY MASS INDEX: 26.81 KG/M2 | DIASTOLIC BLOOD PRESSURE: 82 MMHG | SYSTOLIC BLOOD PRESSURE: 175 MMHG | HEIGHT: 59 IN | RESPIRATION RATE: 16 BRPM

## 2022-03-11 DIAGNOSIS — N36.41 HYPERMOBILITY OF URETHRA: ICD-10-CM

## 2022-03-11 DIAGNOSIS — E11.9 TYPE 2 DIABETES MELLITUS W/OUT COMPLICATIONS: ICD-10-CM

## 2022-03-11 DIAGNOSIS — Z98.890 OTHER SPECIFIED POSTPROCEDURAL STATES: ICD-10-CM

## 2022-03-11 DIAGNOSIS — N95.2 POSTMENOPAUSAL ATROPHIC VAGINITIS: ICD-10-CM

## 2022-03-11 LAB
BILIRUB UR QL STRIP: NEGATIVE
GLUCOSE UR-MCNC: NEGATIVE
HCG UR QL: 0.2 EU/DL
HGB UR QL STRIP.AUTO: NEGATIVE
KETONES UR-MCNC: NEGATIVE
LEUKOCYTE ESTERASE UR QL STRIP: NORMAL
NITRITE UR QL STRIP: NEGATIVE
PH UR STRIP: 7
PROT UR STRIP-MCNC: 100
SP GR UR STRIP: 1.01

## 2022-03-11 PROCEDURE — 51798 US URINE CAPACITY MEASURE: CPT

## 2022-03-11 PROCEDURE — 81003 URINALYSIS AUTO W/O SCOPE: CPT | Mod: QW

## 2022-03-11 PROCEDURE — 99214 OFFICE O/P EST MOD 30 MIN: CPT | Mod: 25

## 2022-03-11 NOTE — HISTORY OF PRESENT ILLNESS
[FreeTextEntry1] : HALEIGH, prior sling. PMHx includes stroke last year. She is bothered by OAB and recurrent UTIs. Last visit 12/21 cysto normal besides cystitis cystica, irritation c/w recurrent cystitis. PVRs have been normal post-sling. On AC. Last visit in 12/21, she was started on recurrent UTI abx. \par \par Today, reports that she has been taking the once daily nitrofurnatoin recurrent UTI prophylaxis and is about to run out of supply. Reports the abx seem to be keeping her from getting UTIs. She is a poor historian despite use of . She reports still having symptoms. Unclear what they are - after questioning, it is not freq or urgency, no UI, denies hematuria or flank pain, denies fever or chills. No pad use. No blood per vagina or in stool. No constipation. Over 30 min of questions asked and it is difficult for the patient to answer questions. Saw her PMD Dr. Mayo yesterday.

## 2022-03-11 NOTE — ASSESSMENT
[FreeTextEntry1] : Continue recurrent UTi abx prophy x 6 months total (3 more months) then DC and reassess. Will discuss pt's care with her PMD. RTO 3 months or prn.\par \par Plan:\par [] consider OAB treatment? myrbetriq vs PTNS vs SNS (less likely bladder botox due to recurrent UTis) pt reports urinary freq at times, other times denies this is an issue\par [] 6 months recurrent UTI prophy - until 6/22 (nitrofurantoin) - rx refilled\par [] consider UDS\par [] f/u UCx, f/u urine cytology\par

## 2022-03-11 NOTE — REASON FOR VISIT
[[employer] ] : provided by [employer]   [Interpreters_IDNumber] : [id number] [Interpreters_FullName] : [place] [TWNoteComboBox1] : Citizen of [place]

## 2022-03-14 LAB — BACTERIA UR CULT: NORMAL

## 2022-03-15 LAB — URINE CYTOLOGY: NORMAL

## 2022-03-17 NOTE — ED PROVIDER NOTE - SKIN NEGATIVE STATEMENT, MLM
no abrasions, no jaundice, no lesions, no pruritis, and no rashes. Quinolones Counseling:  I discussed with the patient the risks of fluoroquinolones including but not limited to GI upset, allergic reaction, drug rash, diarrhea, dizziness, photosensitivity, yeast infections, liver function test abnormalities, tendonitis/tendon rupture.

## 2022-03-22 ENCOUNTER — APPOINTMENT (OUTPATIENT)
Dept: ELECTROPHYSIOLOGY | Facility: CLINIC | Age: 72
End: 2022-03-22

## 2022-03-28 ENCOUNTER — NON-APPOINTMENT (OUTPATIENT)
Age: 72
End: 2022-03-28

## 2022-03-28 ENCOUNTER — APPOINTMENT (OUTPATIENT)
Dept: CARDIOLOGY | Facility: CLINIC | Age: 72
End: 2022-03-28
Payer: MEDICARE

## 2022-03-28 VITALS
BODY MASS INDEX: 28.63 KG/M2 | WEIGHT: 142 LBS | SYSTOLIC BLOOD PRESSURE: 182 MMHG | TEMPERATURE: 97.8 F | OXYGEN SATURATION: 95 % | HEART RATE: 73 BPM | DIASTOLIC BLOOD PRESSURE: 90 MMHG | RESPIRATION RATE: 14 BRPM | HEIGHT: 59 IN

## 2022-03-28 VITALS — SYSTOLIC BLOOD PRESSURE: 160 MMHG | DIASTOLIC BLOOD PRESSURE: 80 MMHG

## 2022-03-28 PROCEDURE — 93000 ELECTROCARDIOGRAM COMPLETE: CPT

## 2022-03-28 PROCEDURE — 99213 OFFICE O/P EST LOW 20 MIN: CPT

## 2022-03-28 NOTE — CARDIOLOGY SUMMARY
[de-identified] : CORONARY VESSELS: The coronary circulation is right dominant.\par LM:   --  LM: The vessel was large sized. Angiography showed mild\par atherosclerosis with no flow limiting lesions.\par LAD:   --  LAD: The vessel was large sized.\par --  Distal LAD: Angiography showed severe atherosclerosis.\par CX:   --  Circumflex: There was a tubular 50 % stenosis in the middle third\par of the vessel segment.\par --  OM1: The vessel was large sized that bifurcates into 2 branches. There\par was a 80 % stenosis in both branches\par --  LPL1: There was a 70 % stenosis at the ostium of the vessel segment.\par RCA:   --  RPDA: The vessel was medium sized. Angiography showed severe\par atherosclerosis.\par COMPLICATIONS: No complications occurred during the cath lab visit.\par DIAGNOSTIC IMPRESSIONS: Multivessel disease ( distal LAD, OM1 and LPL Of CX\par and RPDA)\par DIAGNOSTIC RECOMMENDATIONS: Branch vessel and severe distal LAD and RPDA\par disease not suitable for PCI , recommend medical therapy with antianginal\par if persistent angina despite optimal medical therapy may consider PCI of\par OM1 of CX.\par Prepared and signed by\par Amilcar Vivar MD\par Signed 12/02/2021 06:31:12\par

## 2022-03-28 NOTE — HISTORY OF PRESENT ILLNESS
[FreeTextEntry1] : \par : Zach, ID#- [id number]\par \par Patient is a 71 year old Persian speaking woman with HTN, HLD, DM, prior CVAwith Right sided deficits, prior ABI/BSO, RBBB who had with COVID-19 viral pneumonia  in  May/June 2020 and  was found to have Mobitz II block is  s/p successful [employer] Micra AV+ leadless pacemaker implantation via right femoral vein.  Patient is followed by me for coronary artery disease. Patient was admitted to [facility] in 11/2021 for NSTEMI.\par Patient came to ER  with chest pain. Patient had positive troponin elevated 0.69 --> 0.55 --> 0.48, admitted for NSTEMI, and she had a left heart cardiac catheterization which showed showed  diffuse multivessel disease RCA, LAD, OM. Patient was explained that she had NSTEMI, Cath showed 2 Vessel CAD which are not amenable for PCI. Patient to be managed medically  Patient's medical therapy was optimized, amlodipine was discontinued in order to facilitate higher dose beta-blocker therapy.  Patient discharged home on Plavix, aspirin, Ranexa 500 twice daily, atorvastatin 80 mg daily, Toprol- mg daily.  During the hospital it was decided that unless she has persistent angina symptoms on maximal medical therapy, there are no plans for PCI.\par \par Patient now presents for a follow-up today.    Patient denies any symptoms suggestive of angina or CHF.  Patient is not very active at baseline.  Patient did not bring her medication bottles.  Patient states that she did not take her antihypertensive medications today.\par \par \par

## 2022-03-28 NOTE — ASSESSMENT
[FreeTextEntry1] : Summary:\par  1. Bubble study only to evaluate for shunting.\par  2. Left ventricular ejection fraction, by visual estimation, is >75%.\par  3. Normal global left ventricular systolic function.\par  4. Color flow doppler and intravenous injection of agitated saline demonstrates the presence of an intact intra atrial septum.\par \par MD Lance Electronically signed on 4/17/2020 at 1:10:57 PM\par \par \par \par Summary:\par  1. Left ventricular ejection fraction, by visual estimation, is 55 to 60%.\par  2. Normal global left ventricular systolic function.\par  3. Normal left ventricular internal cavity size.\par  4. Normal right ventricular size and function.\par  5. The left atrium is normal in size.\par  6. Mild mitral annular calcification.\par  7. Mild thickening and calcification of the anterior and posterior mitral valve leaflets.\par  8. Trace mitral valve regurgitation.\par  9. Sclerotic aortic valve with normal opening.\par 10. There is no evidence of pericardial effusion.\par \par MD Lance Electronically signed on 11/25/2021 at 1:59:26 PM\par \par \par \par \par \par CORONARY VESSELS: The coronary circulation is right dominant.\par LM:   --  LM: The vessel was large sized. Angiography showed mild\par atherosclerosis with no flow limiting lesions.\par LAD:   --  LAD: The vessel was large sized.\par --  Distal LAD: Angiography showed severe atherosclerosis.\par CX:   --  Circumflex: There was a tubular 50 % stenosis in the middle third\par of the vessel segment.\par --  OM1: The vessel was large sized that bifurcates into 2 branches. There\par was a 80 % stenosis in both branches\par --  LPL1: There was a 70 % stenosis at the ostium of the vessel segment.\par RCA:   --  RPDA: The vessel was medium sized. Angiography showed severe\par atherosclerosis.\par COMPLICATIONS: No complications occurred during the cath lab visit.\par DIAGNOSTIC IMPRESSIONS: Multivessel disease ( distal LAD, OM1 and LPL Of CX\par and RPDA)\par DIAGNOSTIC RECOMMENDATIONS: Branch vessel and severe distal LAD and RPDA\par disease not suitable for PCI , recommend medical therapy with antianginal\par if persistent angina despite optimal medical therapy may consider PCI of\par OM1 of CX.\par Prepared and signed by\par Amilcar Vivar MD\par Signed 12/02/2021 06:31:12\par \par EKG 3/28/2022- Electronic ventricular pacemaker \par Pacemaker ECG, No further analysis \par \par \par Assessment:\par 1. CAD-patient has multivessel CAD based on cardiac catheterization in November 2021, not amenable for PCI, patient is treated medically.  Patient is currently asymptomatic, no symptoms suggestive of angina or CHF\par 2. Mobitz 2 heart block S/P successful Medtronic Micra AV+ leadless pacemaker implantation \par 3. HTN-elevated\par 4. DM/HLD/CVA and other problems as noted in the history\par \par Recommendations:\par 1.  Continue current medical therapy\par 2.  Patient has elevated blood pressure, she was advised to come to the office in 1 week with all of her medication bottles for blood pressure check.\par 3.  Follow-up in 6 weeks

## 2022-03-28 NOTE — PHYSICAL EXAM
[Normal] : normal gait [de-identified] : Able to ambulate, able to move extremities, she has some residual deficits from prior CVA

## 2022-04-04 ENCOUNTER — APPOINTMENT (OUTPATIENT)
Dept: CARDIOLOGY | Facility: CLINIC | Age: 72
End: 2022-04-04
Payer: MEDICARE

## 2022-04-04 ENCOUNTER — NON-APPOINTMENT (OUTPATIENT)
Age: 72
End: 2022-04-04

## 2022-04-04 VITALS
HEART RATE: 71 BPM | TEMPERATURE: 97.7 F | WEIGHT: 142 LBS | OXYGEN SATURATION: 100 % | BODY MASS INDEX: 28.63 KG/M2 | HEIGHT: 59 IN | DIASTOLIC BLOOD PRESSURE: 70 MMHG | SYSTOLIC BLOOD PRESSURE: 156 MMHG

## 2022-04-04 PROCEDURE — 99214 OFFICE O/P EST MOD 30 MIN: CPT

## 2022-04-04 RX ORDER — METFORMIN HYDROCHLORIDE 1000 MG/1
1000 TABLET, COATED ORAL
Refills: 0 | Status: DISCONTINUED | COMMUNITY
Start: 2021-12-27 | End: 2022-04-04

## 2022-04-04 RX ORDER — NITROFURANTOIN MACROCRYSTALS 100 MG/1
100 CAPSULE ORAL
Qty: 90 | Refills: 1 | Status: DISCONTINUED | COMMUNITY
Start: 2021-12-08 | End: 2022-04-04

## 2022-04-11 ENCOUNTER — NON-APPOINTMENT (OUTPATIENT)
Age: 72
End: 2022-04-11

## 2022-04-11 VITALS — RESPIRATION RATE: 14 BRPM | DIASTOLIC BLOOD PRESSURE: 72 MMHG | TEMPERATURE: 98.2 F | SYSTOLIC BLOOD PRESSURE: 164 MMHG

## 2022-04-11 VITALS — OXYGEN SATURATION: 98 % | HEART RATE: 70 BPM | DIASTOLIC BLOOD PRESSURE: 70 MMHG | SYSTOLIC BLOOD PRESSURE: 162 MMHG

## 2022-04-16 ENCOUNTER — EMERGENCY (EMERGENCY)
Facility: HOSPITAL | Age: 72
LOS: 1 days | Discharge: HOME | End: 2022-04-16
Attending: EMERGENCY MEDICINE
Payer: MEDICARE

## 2022-04-16 VITALS
OXYGEN SATURATION: 96 % | HEART RATE: 75 BPM | WEIGHT: 179.9 LBS | TEMPERATURE: 98 F | SYSTOLIC BLOOD PRESSURE: 216 MMHG | DIASTOLIC BLOOD PRESSURE: 99 MMHG | HEIGHT: 59 IN | RESPIRATION RATE: 16 BRPM

## 2022-04-16 VITALS
RESPIRATION RATE: 18 BRPM | SYSTOLIC BLOOD PRESSURE: 186 MMHG | TEMPERATURE: 98 F | DIASTOLIC BLOOD PRESSURE: 85 MMHG | HEART RATE: 68 BPM | OXYGEN SATURATION: 98 %

## 2022-04-16 DIAGNOSIS — Z86.011 PERSONAL HISTORY OF BENIGN NEOPLASM OF THE BRAIN: Chronic | ICD-10-CM

## 2022-04-16 LAB
ALBUMIN SERPL ELPH-MCNC: 4.7 G/DL — SIGNIFICANT CHANGE UP (ref 3.3–5.2)
ALP SERPL-CCNC: 93 U/L — SIGNIFICANT CHANGE UP (ref 40–120)
ALT FLD-CCNC: 25 U/L — SIGNIFICANT CHANGE UP
ANION GAP SERPL CALC-SCNC: 15 MMOL/L — SIGNIFICANT CHANGE UP (ref 5–17)
AST SERPL-CCNC: 16 U/L — SIGNIFICANT CHANGE UP
BASOPHILS # BLD AUTO: 0.06 K/UL — SIGNIFICANT CHANGE UP (ref 0–0.2)
BASOPHILS NFR BLD AUTO: 0.7 % — SIGNIFICANT CHANGE UP (ref 0–2)
BILIRUB SERPL-MCNC: 0.6 MG/DL — SIGNIFICANT CHANGE UP (ref 0.4–2)
BUN SERPL-MCNC: 19.6 MG/DL — SIGNIFICANT CHANGE UP (ref 8–20)
CALCIUM SERPL-MCNC: 9.7 MG/DL — SIGNIFICANT CHANGE UP (ref 8.6–10.2)
CHLORIDE SERPL-SCNC: 96 MMOL/L — LOW (ref 98–107)
CO2 SERPL-SCNC: 26 MMOL/L — SIGNIFICANT CHANGE UP (ref 22–29)
CREAT SERPL-MCNC: 0.65 MG/DL — SIGNIFICANT CHANGE UP (ref 0.5–1.3)
EGFR: 95 ML/MIN/1.73M2 — SIGNIFICANT CHANGE UP
EOSINOPHIL # BLD AUTO: 0.14 K/UL — SIGNIFICANT CHANGE UP (ref 0–0.5)
EOSINOPHIL NFR BLD AUTO: 1.5 % — SIGNIFICANT CHANGE UP (ref 0–6)
GLUCOSE SERPL-MCNC: 182 MG/DL — HIGH (ref 70–99)
HCT VFR BLD CALC: 39.6 % — SIGNIFICANT CHANGE UP (ref 34.5–45)
HGB BLD-MCNC: 13.4 G/DL — SIGNIFICANT CHANGE UP (ref 11.5–15.5)
IMM GRANULOCYTES NFR BLD AUTO: 1 % — SIGNIFICANT CHANGE UP (ref 0–1.5)
LYMPHOCYTES # BLD AUTO: 2.47 K/UL — SIGNIFICANT CHANGE UP (ref 1–3.3)
LYMPHOCYTES # BLD AUTO: 26.9 % — SIGNIFICANT CHANGE UP (ref 13–44)
MCHC RBC-ENTMCNC: 30.5 PG — SIGNIFICANT CHANGE UP (ref 27–34)
MCHC RBC-ENTMCNC: 33.8 GM/DL — SIGNIFICANT CHANGE UP (ref 32–36)
MCV RBC AUTO: 90 FL — SIGNIFICANT CHANGE UP (ref 80–100)
MONOCYTES # BLD AUTO: 0.76 K/UL — SIGNIFICANT CHANGE UP (ref 0–0.9)
MONOCYTES NFR BLD AUTO: 8.3 % — SIGNIFICANT CHANGE UP (ref 2–14)
NEUTROPHILS # BLD AUTO: 5.65 K/UL — SIGNIFICANT CHANGE UP (ref 1.8–7.4)
NEUTROPHILS NFR BLD AUTO: 61.6 % — SIGNIFICANT CHANGE UP (ref 43–77)
PLATELET # BLD AUTO: 401 K/UL — HIGH (ref 150–400)
POTASSIUM SERPL-MCNC: 4.4 MMOL/L — SIGNIFICANT CHANGE UP (ref 3.5–5.3)
POTASSIUM SERPL-SCNC: 4.4 MMOL/L — SIGNIFICANT CHANGE UP (ref 3.5–5.3)
PROT SERPL-MCNC: 7.5 G/DL — SIGNIFICANT CHANGE UP (ref 6.6–8.7)
RBC # BLD: 4.4 M/UL — SIGNIFICANT CHANGE UP (ref 3.8–5.2)
RBC # FLD: 13.5 % — SIGNIFICANT CHANGE UP (ref 10.3–14.5)
SARS-COV-2 RNA SPEC QL NAA+PROBE: SIGNIFICANT CHANGE UP
SODIUM SERPL-SCNC: 136 MMOL/L — SIGNIFICANT CHANGE UP (ref 135–145)
TROPONIN T SERPL-MCNC: <0.01 NG/ML — SIGNIFICANT CHANGE UP (ref 0–0.06)
WBC # BLD: 9.17 K/UL — SIGNIFICANT CHANGE UP (ref 3.8–10.5)
WBC # FLD AUTO: 9.17 K/UL — SIGNIFICANT CHANGE UP (ref 3.8–10.5)

## 2022-04-16 PROCEDURE — 99285 EMERGENCY DEPT VISIT HI MDM: CPT

## 2022-04-16 PROCEDURE — 84484 ASSAY OF TROPONIN QUANT: CPT

## 2022-04-16 PROCEDURE — 99285 EMERGENCY DEPT VISIT HI MDM: CPT | Mod: 25

## 2022-04-16 PROCEDURE — 80053 COMPREHEN METABOLIC PANEL: CPT

## 2022-04-16 PROCEDURE — 71046 X-RAY EXAM CHEST 2 VIEWS: CPT | Mod: 26

## 2022-04-16 PROCEDURE — 85025 COMPLETE CBC W/AUTO DIFF WBC: CPT

## 2022-04-16 PROCEDURE — 36415 COLL VENOUS BLD VENIPUNCTURE: CPT

## 2022-04-16 PROCEDURE — 96374 THER/PROPH/DIAG INJ IV PUSH: CPT

## 2022-04-16 PROCEDURE — 71046 X-RAY EXAM CHEST 2 VIEWS: CPT

## 2022-04-16 PROCEDURE — 93005 ELECTROCARDIOGRAM TRACING: CPT

## 2022-04-16 PROCEDURE — 93010 ELECTROCARDIOGRAM REPORT: CPT

## 2022-04-16 PROCEDURE — U0003: CPT

## 2022-04-16 PROCEDURE — U0005: CPT

## 2022-04-16 PROCEDURE — 96375 TX/PRO/DX INJ NEW DRUG ADDON: CPT

## 2022-04-16 RX ORDER — KETOROLAC TROMETHAMINE 30 MG/ML
30 SYRINGE (ML) INJECTION ONCE
Refills: 0 | Status: DISCONTINUED | OUTPATIENT
Start: 2022-04-16 | End: 2022-04-16

## 2022-04-16 RX ORDER — METOCLOPRAMIDE HCL 10 MG
10 TABLET ORAL ONCE
Refills: 0 | Status: COMPLETED | OUTPATIENT
Start: 2022-04-16 | End: 2022-04-16

## 2022-04-16 RX ADMIN — Medication 10 MILLIGRAM(S): at 17:32

## 2022-04-16 RX ADMIN — Medication 30 MILLIGRAM(S): at 17:32

## 2022-04-16 NOTE — ED ADULT TRIAGE NOTE - CHIEF COMPLAINT QUOTE
C/o chest pain x1day. Pt states, "I also have high blood pressure that has been for one week". +Head pain. Hx of HTN, CVA, and CAD.

## 2022-04-16 NOTE — ED ADULT NURSE NOTE - OBJECTIVE STATEMENT
C/o chest pain x1 day. Pt states, "I also am having high blood pressure for the past week". +Headache and nausea. Denies SOB. Hx of HTN, CVA, and CAD. Pt alert and oriented x3, respirations even and unlabored, skin warm and dry, color appropriate for ethnicity, speech clear, moving extremities. Updated pt on plan of care. Will continue to monitor.

## 2022-04-16 NOTE — ED PROVIDER NOTE - PATIENT PORTAL LINK FT
You can access the FollowMyHealth Patient Portal offered by [facility] by registering at the following website: [email or web address]. By joining [employer]’s FollowMyHealth portal, you will also be able to view your health information using other applications (apps) compatible with our system.

## 2022-04-16 NOTE — ED PROVIDER NOTE - NEUROLOGICAL, MLM
5-Fu Counseling: 5-Fluorouracil Counseling:  I discussed with the patient the risks of 5-fluorouracil including but not limited to erythema, scaling, itching, weeping, crusting, and pain. Alert and oriented, no focal deficits, no motor or sensory deficits.

## 2022-04-16 NOTE — ED ADULT NURSE NOTE - NSIMPLEMENTINTERV_GEN_ALL_ED
Implemented All Fall with Harm Risk Interventions:  [place] to call system. Call bell, personal items and telephone within reach. Instruct patient to call for assistance. Room bathroom lighting operational. Non-slip footwear when patient is off stretcher. Physically safe environment: no spills, clutter or unnecessary equipment. Stretcher in lowest position, wheels locked, appropriate side rails in place. Provide visual cue, wrist band, yellow gown, etc. Monitor gait and stability. Monitor for mental status changes and reorient to person, place, and time. Review medications for side effects contributing to fall risk. Reinforce activity limits and safety measures with patient and family. Provide visual clues: red socks.

## 2022-05-16 ENCOUNTER — APPOINTMENT (OUTPATIENT)
Dept: CARDIOLOGY | Facility: CLINIC | Age: 72
End: 2022-05-16
Payer: MEDICARE

## 2022-05-16 VITALS
OXYGEN SATURATION: 96 % | BODY MASS INDEX: 29.43 KG/M2 | TEMPERATURE: 98.6 F | DIASTOLIC BLOOD PRESSURE: 83 MMHG | HEART RATE: 72 BPM | SYSTOLIC BLOOD PRESSURE: 165 MMHG | WEIGHT: 146 LBS | HEIGHT: 59 IN

## 2022-05-16 PROCEDURE — 99213 OFFICE O/P EST LOW 20 MIN: CPT

## 2022-05-16 RX ORDER — VALSARTAN 80 MG/1
80 TABLET, COATED ORAL DAILY
Qty: 90 | Refills: 1 | Status: DISCONTINUED | COMMUNITY
Start: 2022-04-04 | End: 2022-05-16

## 2022-05-16 NOTE — PHYSICAL EXAM
[Normal] : normal gait [de-identified] : Chaperone: Rachael Marsh MA [de-identified] : Able to ambulate, able to move extremities, she has some residual deficits from prior CVA

## 2022-05-16 NOTE — CARDIOLOGY SUMMARY
[de-identified] : CORONARY VESSELS: The coronary circulation is right dominant.\par LM:   --  LM: The vessel was large sized. Angiography showed mild\par atherosclerosis with no flow limiting lesions.\par LAD:   --  LAD: The vessel was large sized.\par --  Distal LAD: Angiography showed severe atherosclerosis.\par CX:   --  Circumflex: There was a tubular 50 % stenosis in the middle third\par of the vessel segment.\par --  OM1: The vessel was large sized that bifurcates into 2 branches. There\par was a 80 % stenosis in both branches\par --  LPL1: There was a 70 % stenosis at the ostium of the vessel segment.\par RCA:   --  RPDA: The vessel was medium sized. Angiography showed severe\par atherosclerosis.\par COMPLICATIONS: No complications occurred during the cath lab visit.\par DIAGNOSTIC IMPRESSIONS: Multivessel disease ( distal LAD, OM1 and LPL Of CX\par and RPDA)\par DIAGNOSTIC RECOMMENDATIONS: Branch vessel and severe distal LAD and RPDA\par disease not suitable for PCI , recommend medical therapy with antianginal\par if persistent angina despite optimal medical therapy may consider PCI of\par OM1 of CX.\par Prepared and signed by\par Amilcar Vivar MD\par Signed 12/02/2021 06:31:12\par

## 2022-05-16 NOTE — HISTORY OF PRESENT ILLNESS
[FreeTextEntry1] : \par : Patient's granddaughter is with the patient.\par \par Patient is a 71 year old Slovak speaking woman with HTN, HLD, DM, prior CVAwith Right sided deficits, prior ABI/BSO, RBBB who had with COVID-19 viral pneumonia  in  May/June 2020 and  was found to have Mobitz II block is  s/p successful Medtronic Micra AV+ leadless pacemaker implantation via right femoral vein.  Patient is followed by me for coronary artery disease. Patient was admitted to [facility] in 11/2021 for NSTEMI.\par Patient came to ER  with chest pain. Patient had positive troponin elevated 0.69 --> 0.55 --> 0.48, admitted for NSTEMI, and she had a left heart cardiac catheterization which showed showed  diffuse multivessel disease RCA, LAD, OM. Patient was explained that she had NSTEMI, Cath showed 2 Vessel CAD which are not amenable for PCI. Patient to be managed medically  Patient's medical therapy was optimized, amlodipine was discontinued in order to facilitate higher dose beta-blocker therapy.  Patient discharged home on Plavix, aspirin, Ranexa 500 twice daily, atorvastatin 80 mg daily, Toprol- mg daily.  During the hospital it was decided that unless she has persistent angina symptoms on maximal medical therapy, there are no plans for PCI.\par \par Patient now presents for a follow-up today.    Patient denies any symptoms suggestive of angina or CHF.  Patient is not very active at baseline.  Since her last visit, patient has made a couple of visits to the office, according to the granddaughter she was supposed to be taking 80 mg of valsartan somehow it was sent as a 40 mg prescription to the pharmacy.  Patient's granddaughter reports that when she checks her grandmother's blood pressure it is quite high.\par \par \par

## 2022-05-16 NOTE — ASSESSMENT
[FreeTextEntry1] : Summary:\par  1. Bubble study only to evaluate for shunting.\par  2. Left ventricular ejection fraction, by visual estimation, is >75%.\par  3. Normal global left ventricular systolic function.\par  4. Color flow doppler and intravenous injection of agitated saline demonstrates the presence of an intact intra atrial septum.\par \par MD Lance Electronically signed on 4/17/2020 at 1:10:57 PM\par \par \par \par Summary:\par  1. Left ventricular ejection fraction, by visual estimation, is 55 to 60%.\par  2. Normal global left ventricular systolic function.\par  3. Normal left ventricular internal cavity size.\par  4. Normal right ventricular size and function.\par  5. The left atrium is normal in size.\par  6. Mild mitral annular calcification.\par  7. Mild thickening and calcification of the anterior and posterior mitral valve leaflets.\par  8. Trace mitral valve regurgitation.\par  9. Sclerotic aortic valve with normal opening.\par 10. There is no evidence of pericardial effusion.\par \par MD Lance Electronically signed on 11/25/2021 at 1:59:26 PM\par \par \par \par \par \par CORONARY VESSELS: The coronary circulation is right dominant.\par LM:   --  LM: The vessel was large sized. Angiography showed mild\par atherosclerosis with no flow limiting lesions.\par LAD:   --  LAD: The vessel was large sized.\par --  Distal LAD: Angiography showed severe atherosclerosis.\par CX:   --  Circumflex: There was a tubular 50 % stenosis in the middle third\par of the vessel segment.\par --  OM1: The vessel was large sized that bifurcates into 2 branches. There\par was a 80 % stenosis in both branches\par --  LPL1: There was a 70 % stenosis at the ostium of the vessel segment.\par RCA:   --  RPDA: The vessel was medium sized. Angiography showed severe\par atherosclerosis.\par COMPLICATIONS: No complications occurred during the cath lab visit.\par DIAGNOSTIC IMPRESSIONS: Multivessel disease ( distal LAD, OM1 and LPL Of CX\par and RPDA)\par DIAGNOSTIC RECOMMENDATIONS: Branch vessel and severe distal LAD and RPDA\par disease not suitable for PCI , recommend medical therapy with antianginal\par if persistent angina despite optimal medical therapy may consider PCI of\par OM1 of CX.\par Prepared and signed by\par Amilcar Vivar MD\par Signed 12/02/2021 06:31:12\par \par EKG 3/28/2022- Electronic ventricular pacemaker \par Pacemaker ECG, No further analysis \par \par \par Assessment:\par 1. CAD-patient has multivessel CAD based on cardiac catheterization in November 2021, not amenable for PCI, patient is treated medically.  Patient is currently asymptomatic, no symptoms suggestive of angina or CHF\par 2. Mobitz 2 heart block S/P successful Medtronic Micra AV+ leadless pacemaker implantation \par 3. HTN-controlled\par 4. DM/HLD/CVA and other problems as noted in the history\par \par Recommendations:\par 1.  Continue current medical therapy except valsartan dose was increased from 40 mg to 80 mg daily\par 2.  Follow-up in 6 months

## 2022-06-10 ENCOUNTER — RESULT CHARGE (OUTPATIENT)
Age: 72
End: 2022-06-10

## 2022-06-10 ENCOUNTER — APPOINTMENT (OUTPATIENT)
Dept: UROGYNECOLOGY | Facility: CLINIC | Age: 72
End: 2022-06-10
Payer: MEDICARE

## 2022-06-10 VITALS — SYSTOLIC BLOOD PRESSURE: 122 MMHG | DIASTOLIC BLOOD PRESSURE: 76 MMHG

## 2022-06-10 DIAGNOSIS — R35.1 NOCTURIA: ICD-10-CM

## 2022-06-10 DIAGNOSIS — N39.0 URINARY TRACT INFECTION, SITE NOT SPECIFIED: ICD-10-CM

## 2022-06-10 DIAGNOSIS — N39.46 MIXED INCONTINENCE: ICD-10-CM

## 2022-06-10 DIAGNOSIS — R35.0 FREQUENCY OF MICTURITION: ICD-10-CM

## 2022-06-10 DIAGNOSIS — R39.15 URGENCY OF URINATION: ICD-10-CM

## 2022-06-10 DIAGNOSIS — R33.9 RETENTION OF URINE, UNSPECIFIED: ICD-10-CM

## 2022-06-10 LAB
BILIRUB UR QL STRIP: NORMAL
CLARITY UR: CLEAR
COLLECTION METHOD: NORMAL
GLUCOSE UR-MCNC: 500
HCG UR QL: 0.2 EU/DL
HGB UR QL STRIP.AUTO: NORMAL
KETONES UR-MCNC: NORMAL
LEUKOCYTE ESTERASE UR QL STRIP: NORMAL
NITRITE UR QL STRIP: NORMAL
PH UR STRIP: 7
PROT UR STRIP-MCNC: NORMAL
SP GR UR STRIP: 1.01

## 2022-06-10 PROCEDURE — 51798 US URINE CAPACITY MEASURE: CPT

## 2022-06-10 PROCEDURE — 99213 OFFICE O/P EST LOW 20 MIN: CPT | Mod: 25

## 2022-06-10 NOTE — HISTORY OF PRESENT ILLNESS
[FreeTextEntry1] : [place], prior sling. PMHx includes stroke and MI last year. She is bothered by OAB and recurrent UTIs. Last visit 12/21 cysto normal besides cystitis cystica, irritation c/w recurrent cystitis. PVRs have been normal post-sling. On AC. Last visit UCx and cytol wnl. She has been on daily recurrent UTi abx prophy x 12 months. Today, reports no bladder complaints. No dysuria, UTIs, hematuria, UI, urge, freq. Stated she took the uti prophy abx. \par \par \par .

## 2022-06-10 NOTE — ASSESSMENT
[FreeTextEntry1] : No UI symptoms. Hx prior sling. Denies any more UTI symptoms. No active bladder/pelvic floor intervention. RTO prn. Consider vag moisturizer if needed. Discuss DM control/glucosuria with PMD. Precautions reviewed.\par \par

## 2022-06-10 NOTE — REASON FOR VISIT
[[employer] ] : provided by [employer]   [Interpreters_IDNumber] : [id number] [Interpreters_FullName] : Cornelius [[id number]] : Irish

## 2022-08-23 NOTE — PROCEDURE NOTE - NSTIMEOUT_GEN_A_CORE
Please schedule follow up with facundo.. it can be telephone    Patient's first and last name, , procedure, and correct site confirmed prior to the start of procedure.

## 2022-09-26 NOTE — ED STATDOCS - ENMT, MLM
Have Your Skin Lesions Been Treated?: not been treated Is This A New Presentation, Or A Follow-Up?: Skin Lesions How Severe Is Your Skin Lesion?: mild Nasal mucosa clear.  Mouth with normal mucosa  Throat has no vesicles, no oropharyngeal exudates and uvula is midline.

## 2022-10-13 ENCOUNTER — RX RENEWAL (OUTPATIENT)
Age: 72
End: 2022-10-13

## 2022-10-26 NOTE — OCCUPATIONAL THERAPY INITIAL EVALUATION ADULT - LONG TERM MEMORY, REHAB EVAL
Spoke with patient's son and Dr. Davis regarding her recent lab results. Creatinine did bump up to 1.8. However, high risk for readmission. Discussed with son. Will continue lasix 20 mg daily. Continue to work on increasing hydration. Will recheck labs in 2 weeks to make sure creatinine is not continuing to climb.  Patient will have this done with home health.  Patient's son verbalized understanding with no further questions or concerns   intact

## 2022-11-18 ENCOUNTER — NON-APPOINTMENT (OUTPATIENT)
Age: 72
End: 2022-11-18

## 2022-11-18 ENCOUNTER — APPOINTMENT (OUTPATIENT)
Dept: CARDIOLOGY | Facility: CLINIC | Age: 72
End: 2022-11-18

## 2022-11-18 VITALS
HEART RATE: 70 BPM | BODY MASS INDEX: 29.43 KG/M2 | OXYGEN SATURATION: 99 % | WEIGHT: 146 LBS | DIASTOLIC BLOOD PRESSURE: 80 MMHG | HEIGHT: 59 IN | TEMPERATURE: 97.8 F | SYSTOLIC BLOOD PRESSURE: 178 MMHG

## 2022-11-18 DIAGNOSIS — M79.2 NEURALGIA AND NEURITIS, UNSPECIFIED: ICD-10-CM

## 2022-11-18 PROCEDURE — 99214 OFFICE O/P EST MOD 30 MIN: CPT | Mod: 25

## 2022-11-18 PROCEDURE — 93000 ELECTROCARDIOGRAM COMPLETE: CPT

## 2022-11-18 RX ORDER — LANCETS 33 GAUGE
EACH MISCELLANEOUS
Refills: 0 | Status: ACTIVE | COMMUNITY
Start: 2022-05-22

## 2022-11-18 RX ORDER — VALSARTAN 80 MG/1
80 TABLET, COATED ORAL
Qty: 90 | Refills: 3 | Status: DISCONTINUED | COMMUNITY
Start: 2022-05-16 | End: 2022-11-18

## 2022-11-18 RX ORDER — GABAPENTIN 800 MG/1
800 TABLET, COATED ORAL AT BEDTIME
Refills: 0 | Status: ACTIVE | COMMUNITY
Start: 2022-10-31

## 2022-11-18 RX ORDER — VALSARTAN 40 MG/1
40 TABLET, COATED ORAL
Refills: 0 | Status: DISCONTINUED | COMMUNITY
Start: 2022-10-07 | End: 2022-11-18

## 2022-11-18 RX ORDER — ASPIRIN ENTERIC COATED TABLETS 81 MG 81 MG/1
81 TABLET, DELAYED RELEASE ORAL DAILY
Qty: 90 | Refills: 3 | Status: DISCONTINUED | COMMUNITY
Start: 2021-12-27 | End: 2022-11-18

## 2022-11-18 RX ORDER — CLOPIDOGREL BISULFATE 75 MG/1
75 TABLET, FILM COATED ORAL
Qty: 90 | Refills: 3 | Status: DISCONTINUED | COMMUNITY
Start: 2021-12-27 | End: 2022-11-18

## 2022-11-18 NOTE — ASSESSMENT
[FreeTextEntry1] : Summary:\par  1. Bubble study only to evaluate for shunting.\par  2. Left ventricular ejection fraction, by visual estimation, is >75%.\par  3. Normal global left ventricular systolic function.\par  4. Color flow doppler and intravenous injection of agitated saline demonstrates the presence of an intact intra atrial septum.\par \par MD Lance Electronically signed on 4/17/2020 at 1:10:57 PM\par \par \par \par Summary:\par  1. Left ventricular ejection fraction, by visual estimation, is 55 to 60%.\par  2. Normal global left ventricular systolic function.\par  3. Normal left ventricular internal cavity size.\par  4. Normal right ventricular size and function.\par  5. The left atrium is normal in size.\par  6. Mild mitral annular calcification.\par  7. Mild thickening and calcification of the anterior and posterior mitral valve leaflets.\par  8. Trace mitral valve regurgitation.\par  9. Sclerotic aortic valve with normal opening.\par 10. There is no evidence of pericardial effusion.\par \par MD Lance Electronically signed on 11/25/2021 at 1:59:26 PM\par \par \par \par \par \par CORONARY VESSELS: The coronary circulation is right dominant.\par LM:   --  LM: The vessel was large sized. Angiography showed mild\par atherosclerosis with no flow limiting lesions.\par LAD:   --  LAD: The vessel was large sized.\par --  Distal LAD: Angiography showed severe atherosclerosis.\par CX:   --  Circumflex: There was a tubular 50 % stenosis in the middle third\par of the vessel segment.\par --  OM1: The vessel was large sized that bifurcates into 2 branches. There\par was a 80 % stenosis in both branches\par --  LPL1: There was a 70 % stenosis at the ostium of the vessel segment.\par RCA:   --  RPDA: The vessel was medium sized. Angiography showed severe\par atherosclerosis.\par COMPLICATIONS: No complications occurred during the cath lab visit.\par DIAGNOSTIC IMPRESSIONS: Multivessel disease ( distal LAD, OM1 and LPL Of CX\par and RPDA)\par DIAGNOSTIC RECOMMENDATIONS: Branch vessel and severe distal LAD and RPDA\par disease not suitable for PCI , recommend medical therapy with antianginal\par if persistent angina despite optimal medical therapy may consider PCI of\par OM1 of CX.\par Prepared and signed by\par Amilcar Vivar MD\par Signed 12/02/2021 06:31:12\par \par EKG 3/28/2022- Electronic ventricular pacemaker \par Pacemaker ECG, No further analysis \par EKG 11/18/2022- Electronic ventricular pacemaker \par Pacemaker ECG, No further analysis \par INSUFFICIENT DATA\par \par Assessment:\par 1. CAD-patient has multivessel CAD based on cardiac catheterization in November 2021, NSTEMI in 11/2021 not amenable for PCI, patient is treated medically.  Patient is currently asymptomatic, no symptoms suggestive of angina or CHF\par 2. Mobitz 2 heart block S/P successful Medtronic Micra AV+ leadless pacemaker implantation \par 3. HTN-controlled\par 4. DM/HLD/CVA and other problems as noted in the history\par \par Recommendations:\par 1.  May discontinue Plavix \par 2. Continue current medical therapy except valsartan dose was increased from 40 mg to 160 mg daily\par 3.  Patient advised to follow-up with Dr. Mayo for blood pressure check in 1 to 2 weeks\par 4..  Follow-up in 6 months

## 2022-11-18 NOTE — CARDIOLOGY SUMMARY
[de-identified] : CORONARY VESSELS: The coronary circulation is right dominant.\par LM:   --  LM: The vessel was large sized. Angiography showed mild\par atherosclerosis with no flow limiting lesions.\par LAD:   --  LAD: The vessel was large sized.\par --  Distal LAD: Angiography showed severe atherosclerosis.\par CX:   --  Circumflex: There was a tubular 50 % stenosis in the middle third\par of the vessel segment.\par --  OM1: The vessel was large sized that bifurcates into 2 branches. There\par was a 80 % stenosis in both branches\par --  LPL1: There was a 70 % stenosis at the ostium of the vessel segment.\par RCA:   --  RPDA: The vessel was medium sized. Angiography showed severe\par atherosclerosis.\par COMPLICATIONS: No complications occurred during the cath lab visit.\par DIAGNOSTIC IMPRESSIONS: Multivessel disease ( distal LAD, OM1 and LPL Of CX\par and RPDA)\par DIAGNOSTIC RECOMMENDATIONS: Branch vessel and severe distal LAD and RPDA\par disease not suitable for PCI , recommend medical therapy with antianginal\par if persistent angina despite optimal medical therapy may consider PCI of\par OM1 of CX.\par Prepared and signed by\par Amilcar Vivar MD\par Signed 12/02/2021 06:31:12\par

## 2022-11-18 NOTE — HISTORY OF PRESENT ILLNESS
[FreeTextEntry1] : \par : Jaida Fontana MA\par \par Patient is a 72 year old Dutch speaking woman with HTN, HLD, DM, prior CVAwith Right sided deficits, prior ABI/BSO, RBBB who had with COVID-19 viral pneumonia  in  May/June 2020 and  was found to have Mobitz II block is  s/p successful Medtronic Micra AV+ leadless pacemaker implantation via right femoral vein.  Patient is followed by me for coronary artery disease. Patient was admitted to [facility] in 11/2021 for NSTEMI.\par she had a left heart cardiac catheterization which showed showed  diffuse multivessel disease RCA, LAD, OM. Patient was explained that she had NSTEMI, Cath showed 2 Vessel CAD which are not amenable for PCI. Patient to be managed medically  Patient's medical therapy was optimized, amlodipine was discontinued in order to facilitate higher dose beta-blocker therapy.  Patient discharged home on Plavix, aspirin, Ranexa 500 twice daily, atorvastatin 80 mg daily, Toprol- mg daily.  During the hospital it was decided that unless she has persistent angina symptoms on maximal medical therapy, there are no plans for PCI.\par \par Patient now presents for a follow-up today.    Patient denies any symptoms suggestive of angina or CHF.  Patient is not very active at baseline.  Patient brought her medications to the office, she was supposed to be taking valsartan 80 mg daily, but she is taking only 40 mg.\par \par \par

## 2022-11-18 NOTE — PHYSICAL EXAM
[Normal] : no rash, no skin lesions [de-identified] : Chaperone: Jaida Travis MA [de-identified] : Able to ambulate, able to move extremities, she has some residual deficits from prior CVA

## 2022-11-20 ENCOUNTER — EMERGENCY (EMERGENCY)
Facility: HOSPITAL | Age: 72
LOS: 1 days | Discharge: HOME | End: 2022-11-20
Attending: EMERGENCY MEDICINE
Payer: MEDICARE

## 2022-11-20 VITALS — WEIGHT: 147.05 LBS

## 2022-11-20 VITALS
DIASTOLIC BLOOD PRESSURE: 83 MMHG | HEART RATE: 69 BPM | SYSTOLIC BLOOD PRESSURE: 158 MMHG | TEMPERATURE: 98 F | RESPIRATION RATE: 18 BRPM | OXYGEN SATURATION: 100 %

## 2022-11-20 DIAGNOSIS — Z86.011 PERSONAL HISTORY OF BENIGN NEOPLASM OF THE BRAIN: Chronic | ICD-10-CM

## 2022-11-20 LAB
ALBUMIN SERPL ELPH-MCNC: 4.6 G/DL — SIGNIFICANT CHANGE UP (ref 3.3–5.2)
ALP SERPL-CCNC: 127 U/L — HIGH (ref 40–120)
ALT FLD-CCNC: 22 U/L — SIGNIFICANT CHANGE UP
ANION GAP SERPL CALC-SCNC: 14 MMOL/L — SIGNIFICANT CHANGE UP (ref 5–17)
APTT BLD: 30 SEC — SIGNIFICANT CHANGE UP (ref 27.5–35.5)
AST SERPL-CCNC: 17 U/L — SIGNIFICANT CHANGE UP
BASOPHILS # BLD AUTO: 0.09 K/UL — SIGNIFICANT CHANGE UP (ref 0–0.2)
BASOPHILS NFR BLD AUTO: 0.9 % — SIGNIFICANT CHANGE UP (ref 0–2)
BILIRUB SERPL-MCNC: 0.6 MG/DL — SIGNIFICANT CHANGE UP (ref 0.4–2)
BUN SERPL-MCNC: 20.6 MG/DL — HIGH (ref 8–20)
CALCIUM SERPL-MCNC: 9.1 MG/DL — SIGNIFICANT CHANGE UP (ref 8.4–10.5)
CHLORIDE SERPL-SCNC: 99 MMOL/L — SIGNIFICANT CHANGE UP (ref 96–108)
CO2 SERPL-SCNC: 23 MMOL/L — SIGNIFICANT CHANGE UP (ref 22–29)
CREAT SERPL-MCNC: 0.72 MG/DL — SIGNIFICANT CHANGE UP (ref 0.5–1.3)
EGFR: 90 ML/MIN/1.73M2 — SIGNIFICANT CHANGE UP
EOSINOPHIL # BLD AUTO: 0.11 K/UL — SIGNIFICANT CHANGE UP (ref 0–0.5)
EOSINOPHIL NFR BLD AUTO: 1 % — SIGNIFICANT CHANGE UP (ref 0–6)
GLUCOSE SERPL-MCNC: 209 MG/DL — HIGH (ref 70–99)
HCT VFR BLD CALC: 40.3 % — SIGNIFICANT CHANGE UP (ref 34.5–45)
HGB BLD-MCNC: 13.7 G/DL — SIGNIFICANT CHANGE UP (ref 11.5–15.5)
IMM GRANULOCYTES NFR BLD AUTO: 0.8 % — SIGNIFICANT CHANGE UP (ref 0–0.9)
INR BLD: 1.15 RATIO — SIGNIFICANT CHANGE UP (ref 0.88–1.16)
LYMPHOCYTES # BLD AUTO: 3.15 K/UL — SIGNIFICANT CHANGE UP (ref 1–3.3)
LYMPHOCYTES # BLD AUTO: 30 % — SIGNIFICANT CHANGE UP (ref 13–44)
MCHC RBC-ENTMCNC: 29 PG — SIGNIFICANT CHANGE UP (ref 27–34)
MCHC RBC-ENTMCNC: 34 GM/DL — SIGNIFICANT CHANGE UP (ref 32–36)
MCV RBC AUTO: 85.2 FL — SIGNIFICANT CHANGE UP (ref 80–100)
MONOCYTES # BLD AUTO: 1.02 K/UL — HIGH (ref 0–0.9)
MONOCYTES NFR BLD AUTO: 9.7 % — SIGNIFICANT CHANGE UP (ref 2–14)
NEUTROPHILS # BLD AUTO: 6.06 K/UL — SIGNIFICANT CHANGE UP (ref 1.8–7.4)
NEUTROPHILS NFR BLD AUTO: 57.6 % — SIGNIFICANT CHANGE UP (ref 43–77)
PLATELET # BLD AUTO: 359 K/UL — SIGNIFICANT CHANGE UP (ref 150–400)
POTASSIUM SERPL-MCNC: 3.9 MMOL/L — SIGNIFICANT CHANGE UP (ref 3.5–5.3)
POTASSIUM SERPL-SCNC: 3.9 MMOL/L — SIGNIFICANT CHANGE UP (ref 3.5–5.3)
PROT SERPL-MCNC: 7.3 G/DL — SIGNIFICANT CHANGE UP (ref 6.6–8.7)
PROTHROM AB SERPL-ACNC: 13.4 SEC — SIGNIFICANT CHANGE UP (ref 10.5–13.4)
RBC # BLD: 4.73 M/UL — SIGNIFICANT CHANGE UP (ref 3.8–5.2)
RBC # FLD: 13.7 % — SIGNIFICANT CHANGE UP (ref 10.3–14.5)
SARS-COV-2 RNA SPEC QL NAA+PROBE: SIGNIFICANT CHANGE UP
SODIUM SERPL-SCNC: 136 MMOL/L — SIGNIFICANT CHANGE UP (ref 135–145)
TROPONIN T SERPL-MCNC: <0.01 NG/ML — SIGNIFICANT CHANGE UP (ref 0–0.06)
WBC # BLD: 10.51 K/UL — HIGH (ref 3.8–10.5)
WBC # FLD AUTO: 10.51 K/UL — HIGH (ref 3.8–10.5)

## 2022-11-20 PROCEDURE — 0042T: CPT | Mod: MA

## 2022-11-20 PROCEDURE — 99285 EMERGENCY DEPT VISIT HI MDM: CPT

## 2022-11-20 PROCEDURE — 70450 CT HEAD/BRAIN W/O DYE: CPT | Mod: MA

## 2022-11-20 PROCEDURE — 85025 COMPLETE CBC W/AUTO DIFF WBC: CPT

## 2022-11-20 PROCEDURE — U0005: CPT

## 2022-11-20 PROCEDURE — 85610 PROTHROMBIN TIME: CPT

## 2022-11-20 PROCEDURE — 80053 COMPREHEN METABOLIC PANEL: CPT

## 2022-11-20 PROCEDURE — 96374 THER/PROPH/DIAG INJ IV PUSH: CPT | Mod: XU

## 2022-11-20 PROCEDURE — 96361 HYDRATE IV INFUSION ADD-ON: CPT

## 2022-11-20 PROCEDURE — 93010 ELECTROCARDIOGRAM REPORT: CPT

## 2022-11-20 PROCEDURE — 70496 CT ANGIOGRAPHY HEAD: CPT | Mod: MA

## 2022-11-20 PROCEDURE — 82962 GLUCOSE BLOOD TEST: CPT

## 2022-11-20 PROCEDURE — 93005 ELECTROCARDIOGRAM TRACING: CPT

## 2022-11-20 PROCEDURE — 84484 ASSAY OF TROPONIN QUANT: CPT

## 2022-11-20 PROCEDURE — 85730 THROMBOPLASTIN TIME PARTIAL: CPT

## 2022-11-20 PROCEDURE — 36415 COLL VENOUS BLD VENIPUNCTURE: CPT

## 2022-11-20 PROCEDURE — 70498 CT ANGIOGRAPHY NECK: CPT | Mod: MA

## 2022-11-20 PROCEDURE — U0003: CPT

## 2022-11-20 PROCEDURE — 70496 CT ANGIOGRAPHY HEAD: CPT | Mod: 26,MA

## 2022-11-20 PROCEDURE — 96375 TX/PRO/DX INJ NEW DRUG ADDON: CPT | Mod: XU

## 2022-11-20 PROCEDURE — 99285 EMERGENCY DEPT VISIT HI MDM: CPT | Mod: 25

## 2022-11-20 PROCEDURE — 70498 CT ANGIOGRAPHY NECK: CPT | Mod: 26,MA

## 2022-11-20 RX ORDER — SODIUM CHLORIDE 9 MG/ML
1000 INJECTION, SOLUTION INTRAVENOUS ONCE
Refills: 0 | Status: COMPLETED | OUTPATIENT
Start: 2022-11-20 | End: 2022-11-20

## 2022-11-20 RX ORDER — NITROGLYCERIN 6.5 MG
0.5 CAPSULE, EXTENDED RELEASE ORAL ONCE
Refills: 0 | Status: COMPLETED | OUTPATIENT
Start: 2022-11-20 | End: 2022-11-20

## 2022-11-20 RX ORDER — HYDRALAZINE HCL 50 MG
5 TABLET ORAL ONCE
Refills: 0 | Status: COMPLETED | OUTPATIENT
Start: 2022-11-20 | End: 2022-11-20

## 2022-11-20 RX ORDER — ACETAMINOPHEN 500 MG
1000 TABLET ORAL ONCE
Refills: 0 | Status: COMPLETED | OUTPATIENT
Start: 2022-11-20 | End: 2022-11-20

## 2022-11-20 RX ADMIN — Medication 5 MILLIGRAM(S): at 20:12

## 2022-11-20 RX ADMIN — Medication 400 MILLIGRAM(S): at 20:17

## 2022-11-20 RX ADMIN — SODIUM CHLORIDE 1000 MILLILITER(S): 9 INJECTION, SOLUTION INTRAVENOUS at 20:12

## 2022-11-20 NOTE — ED PROVIDER NOTE - OBJECTIVE STATEMENT
72y Female with history of HTN, CVA (2020), extremity poor historian BIBEMS presenting with persistent headache that started earlier today with noted right sided facial droop per EMS who initially reported a LKW of 1700 however patient and son at bedside state that patient facial droop is old. Denies fevers, chills, chest pain, palpitations, shortness of breath, cough, nausea, vomiting, diarrhea, hematuria, dysuria, dark stools, focal neurologic symptoms. 72y Female with history of HTN, CVA (2020), extremity poor historian BIBEMS presenting with persistent headache that started earlier today with noted right sided facial droop per EMS who initially reported a LKW of 1700 however patient and son at bedside state that patient facial droop is old. Denies fevers, chills, chest pain, palpitations, shortness of breath, cough, nausea, vomiting, diarrhea, hematuria, dysuria, dark stools, new focal neurologic symptoms.

## 2022-11-20 NOTE — ED ADULT TRIAGE NOTE - CHIEF COMPLAINT QUOTE
BIBEMS c/o right sided facial numbness and right side facial droop witnessed by family 20 min PTA. At triage, patient reports 10/10 HA. Per EMS, LKW 1900. Family reports HX CVA in 2020, patient discontinued taking PO Plavix. Code stroke called prior to arrival to ED. MD Wilson and MD West at bedside for evaluation upon arrival to ED at 1927. Patient directed to CT from EMS,  at triage.

## 2022-11-20 NOTE — ED PROVIDER NOTE - ATTENDING CONTRIBUTION TO CARE
71 yo female pmh htn cva with residual facial droop comes to ed with   rt facial droop and hypertension;  pt code stroke in ed;  as per son, pt is at baseline; pe awake alert heent  rt facial droop neck supple cor s1s2 lung clear abd soft nontender neuor   rt facial droop  dx eval stroke ; tx htn

## 2022-11-20 NOTE — ED ADULT TRIAGE NOTE - ESI TRIAGE ACUITY LEVEL, MLM
1882 - Patient admitted to NICU from nursery via open crib. Patient placed on preheated warming table on servo control mode. EKG leads to chest x3 and pulse oximeter probe applied c alarms on/audible. VSS. Patient pink c grunting, mild intercostal & substernal retractions. Bilateral breath sounds clear, =. Sats 88% on RA. Argie Line, NNP at bedside. Orders reviewed; assessment completed as noted. 0530 - Placed on CPAP 6 cm H20 @ 30% FiO2 by SANA Srinivasan, RT.     0540 - PIV started in right hand c 24g Introcan jelco. D10W infusing as ordered at 10 ml/hr. Michaelyn Daily 0600 - Tolerated left radial arterial stick c 23g butterfly for ABG, blood culture and CBC. No antibiotics at present per EMY Hernandez. MRSA cultures obtained. Accucheck WNL. Will continue to monitor. 0610 - Tolerated AP CXR. #8 Fr OGT advanced from 17cm to 19cm per EMY Hernandez order. Tolerated well. 2

## 2022-11-20 NOTE — ED PROVIDER NOTE - CARE PLAN
Principal Discharge DX:	Hypertension   1 Principal Discharge DX:	Hypertension  Secondary Diagnosis:	HA (headache)

## 2022-11-20 NOTE — ED PROVIDER NOTE - PHYSICAL EXAMINATION
General: Well appearing in no acute distress. Alert and cooperative.   Head: Normocephalic, atraumatic.  Eyes: PERRLA. No conjunctival injection. No scleral icterus. EOMI  ENMT: Atraumatic external nose and ears. Moist mucous membranes. Oropharynx clear.   Neck: Soft and supple. Full ROM without pain.   Cardiac: Regular rate and regular rhythm. No murmurs. Peripheral pulses 2+ and symmetric in all extremities.   Resp: Unlabored respiratory effort. Lungs CTAB.   Abd: Soft, non-tender, non-distended.   MSK: Spine midline and non-tender.   Skin: Warm and dry.   Neuro: AO x 3. Moves all extremities symmetrically. Motor strength and sensation grossly intact. No nystagmus, no dysmetria. right facial weakness

## 2022-11-20 NOTE — ED ADULT NURSE NOTE - NSIMPLEMENTINTERV_GEN_ALL_ED
Implemented All Fall with Harm Risk Interventions:  [place] to call system. Call bell, personal items and telephone within reach. Instruct patient to call for assistance. Room bathroom lighting operational. Non-slip footwear when patient is off stretcher. Physically safe environment: no spills, clutter or unnecessary equipment. Stretcher in lowest position, wheels locked, appropriate side rails in place. Provide visual cue, wrist band, yellow gown, etc. Monitor gait and stability. Monitor for mental status changes and reorient to person, place, and time. Review medications for side effects contributing to fall risk. Reinforce activity limits and safety measures with patient and family. Provide visual clues: red socks.

## 2022-11-20 NOTE — ED PROVIDER NOTE - NSFOLLOWUPINSTRUCTIONS_ED_ALL_ED_FT
- Please follow-up with your primary care doctor.  Please call for an appointment in the next 1-3 days but if you cannot follow-up with your primary care doctor please return to the ED for any urgent issues.  - You were given a copy of the tests performed today.  Please bring the results with you and review them with your primary care doctor.  - If you have any worsening of symptoms or any other concerns please return to the ED immediately.    - Please continue taking your home medications as directed.      Hypertension    Hypertension, commonly called high blood pressure, is when the force of blood pumping through your arteries is too strong. Hypertension forces your heart to work harder to pump blood. Your arteries may become narrow or stiff. Having untreated or uncontrolled hypertension for a long period of time can cause heart attack, stroke, kidney disease, and other problems. If started on a medication, take exactly as prescribed by your health care professional. Maintain a healthy lifestyle and follow up with your primary care physician.    SEEK IMMEDIATE MEDICAL CARE IF YOU HAVE ANY OF THE FOLLOWING SYMPTOMS: severe headache, confusion, chest pain, abdominal pain, vomiting, or shortness of breath.    Headache    A headache is pain or discomfort felt around the head or neck area. The specific cause of a headache may not be found as there are many types including tension headaches, migraine headaches, and cluster headaches. Watch your condition for any changes. Things you can do to manage your pain include taking over the counter and prescription medications as instructed by your health care provider, lying down in a dark quiet room, limiting stress, getting regular sleep, and refraining from alcohol and tobacco products.    SEEK IMMEDIATE MEDICAL CARE IF YOU HAVE ANY OF THE FOLLOWING SYMPTOMS: fever, vomiting, stiff neck, loss of vision, problems with speech, muscle weakness, loss of balance, trouble walking, passing out, or confusion.

## 2022-11-20 NOTE — ED PROVIDER NOTE - CLINICAL SUMMARY MEDICAL DECISION MAKING FREE TEXT BOX
72y Female who is hypertensive with headache and history of HTN and CVA with residual right sided facial weakness. Patient is neurologically intact to baseline. CT, meds, reassess. 72y Female noted to be hypertensive on arrival with headache and history of HTN and CVA in 2020 with residual right sided facial weakness. Code stroke initially activated per EMS history however son and patient reported that facial weakness is not new and patient is neurologically intact to baseline. Will treat hypertension and headache symptoms. Labs, ivf, reassess.

## 2022-11-20 NOTE — ED ADULT NURSE NOTE - OBJECTIVE STATEMENT
Pt bought to hospital due to elevated blood pressure 230/116 at home, headache, pt was cold, blood sugar 205 as per son happened around 645 pm. Pt history of stroke facial drop and right sided weakness is known from past stroke. Hx of Hx on BP meds took all meds today. Code stroke was activated on arrival and CT was done. Code stroke cancelled at 2014 by Dr. West. BP remains elevated 2.5 mg of hydralazine given. IV fluids and tylenol given.
independent

## 2022-11-20 NOTE — ED PROVIDER NOTE - PROGRESS NOTE DETAILS
CT negative for acute findings. neurology following. -DO Katie improved BP, stable on reassessment. -DO Katie maintained stable BP while in ED. patient asymptomatic on reassessment. labs unremarkable. will dc with pcp follow up. -DO Katie

## 2022-11-20 NOTE — ED PROVIDER NOTE - PATIENT PORTAL LINK FT
You can access the FollowMyHealth Patient Portal offered by [facility] by registering at the following website: [email or web address]. By joining [facility]’s FollowMyHealth portal, you will also be able to view your health information using other applications (apps) compatible with our system.

## 2023-01-23 NOTE — PHYSICAL THERAPY INITIAL EVALUATION ADULT - BALANCE DISTURBANCE, IDENTIFIED IMPAIRMENT CONTRIBUTE, REHAB EVAL
Labs ordered per protocols  Left message for patient to call back.   
Patient has an appointment with  for 1/26/23 for his physical and wants to know if labs could be order, so he can do prior to this appointment. Please advice.  
Patient notified.   
Routing to [facility].   
impaired motor control/impaired coordination/impaired postural control/decreased strength

## 2023-05-05 ENCOUNTER — APPOINTMENT (OUTPATIENT)
Dept: CARDIOLOGY | Facility: CLINIC | Age: 73
End: 2023-05-05
Payer: MEDICARE

## 2023-05-05 ENCOUNTER — NON-APPOINTMENT (OUTPATIENT)
Age: 73
End: 2023-05-05

## 2023-05-05 VITALS
SYSTOLIC BLOOD PRESSURE: 116 MMHG | WEIGHT: 144 LBS | HEART RATE: 71 BPM | TEMPERATURE: 98 F | DIASTOLIC BLOOD PRESSURE: 81 MMHG | OXYGEN SATURATION: 100 % | BODY MASS INDEX: 29.03 KG/M2 | HEIGHT: 59 IN

## 2023-05-05 PROBLEM — I63.9 CEREBRAL INFARCTION, UNSPECIFIED: Chronic | Status: ACTIVE | Noted: 2022-11-20

## 2023-05-05 PROCEDURE — 99213 OFFICE O/P EST LOW 20 MIN: CPT | Mod: 25

## 2023-05-05 PROCEDURE — 93000 ELECTROCARDIOGRAM COMPLETE: CPT

## 2023-05-05 RX ORDER — MELOXICAM 15 MG/1
15 TABLET ORAL
Refills: 0 | Status: DISCONTINUED | COMMUNITY
Start: 2022-10-03 | End: 2023-05-05

## 2023-05-05 NOTE — PHYSICAL EXAM
[Normal] : no rash, no skin lesions [de-identified] : Chaperone: Sheryl Wright MA [de-identified] : Able to ambulate, able to move extremities, she has some residual deficits from prior CVA

## 2023-05-05 NOTE — CARDIOLOGY SUMMARY
[de-identified] : CORONARY VESSELS: The coronary circulation is right dominant.\par LM:   --  LM: The vessel was large sized. Angiography showed mild\par atherosclerosis with no flow limiting lesions.\par LAD:   --  LAD: The vessel was large sized.\par --  Distal LAD: Angiography showed severe atherosclerosis.\par CX:   --  Circumflex: There was a tubular 50 % stenosis in the middle third\par of the vessel segment.\par --  OM1: The vessel was large sized that bifurcates into 2 branches. There\par was a 80 % stenosis in both branches\par --  LPL1: There was a 70 % stenosis at the ostium of the vessel segment.\par RCA:   --  RPDA: The vessel was medium sized. Angiography showed severe\par atherosclerosis.\par COMPLICATIONS: No complications occurred during the cath lab visit.\par DIAGNOSTIC IMPRESSIONS: Multivessel disease ( distal LAD, OM1 and LPL Of CX\par and RPDA)\par DIAGNOSTIC RECOMMENDATIONS: Branch vessel and severe distal LAD and RPDA\par disease not suitable for PCI , recommend medical therapy with antianginal\par if persistent angina despite optimal medical therapy may consider PCI of\par OM1 of CX.\par Prepared and signed by\par Amilcar Vivar MD\par Signed 12/02/2021 06:31:12\par

## 2023-05-05 NOTE — ASSESSMENT
[FreeTextEntry1] : Summary:\par  1. Bubble study only to evaluate for shunting.\par  2. Left ventricular ejection fraction, by visual estimation, is >75%.\par  3. Normal global left ventricular systolic function.\par  4. Color flow doppler and intravenous injection of agitated saline demonstrates the presence of an intact intra atrial septum.\par \par MD Lance Electronically signed on 4/17/2020 at 1:10:57 PM\par \par \par \par Summary:\par  1. Left ventricular ejection fraction, by visual estimation, is 55 to 60%.\par  2. Normal global left ventricular systolic function.\par  3. Normal left ventricular internal cavity size.\par  4. Normal right ventricular size and function.\par  5. The left atrium is normal in size.\par  6. Mild mitral annular calcification.\par  7. Mild thickening and calcification of the anterior and posterior mitral valve leaflets.\par  8. Trace mitral valve regurgitation.\par  9. Sclerotic aortic valve with normal opening.\par 10. There is no evidence of pericardial effusion.\par \par MD Lance Electronically signed on 11/25/2021 at 1:59:26 PM\par \par \par \par \par \par \par  \par EKG 5/5/2023- Electronic ventricular pacemaker \par Pacemaker ECG, No further analysis \par INSUFFICIENT DATA\par \par Assessment:\par 1. CAD-patient has multivessel CAD based on cardiac catheterization in November 2021, NSTEMI in 11/2021 not amenable for PCI, patient is treated medically.  Patient is currently asymptomatic, no symptoms suggestive of angina or CHF\par 2. Mobitz 2 heart block S/P successful Medtronic Micra AV+ leadless pacemaker implantation \par 3. HTN-controlled\par 4. DM/HLD/CVA and other problems as noted in the history\par \par Recommendations:\par 1. Continue current medical therapy y\par 2.  Follow-up in 6 months

## 2023-05-05 NOTE — HISTORY OF PRESENT ILLNESS
[FreeTextEntry1] : \par : Airam Galan, \par \par Patient is a 72 year old Estonian speaking woman with HTN, HLD, DM, prior CVAwith Right sided deficits, prior ABI/BSO, RBBB who had with COVID-19 viral pneumonia  in  May/June 2020 and  was found to have Mobitz II block is  s/p successful Medtronic Micra AV+ leadless pacemaker implantation via right femoral vein.  Patient is followed by me for coronary artery disease. Patient was admitted to [facility] in 11/2021 for NSTEMI.\par she had a left heart cardiac catheterization which showed showed  diffuse multivessel disease RCA, LAD, OM. Patient was explained that she had NSTEMI, Cath showed 2 Vessel CAD which are not amenable for PCI. Patient to be managed medically  Patient's medical therapy was optimized, amlodipine was discontinued in order to facilitate higher dose beta-blocker therapy.  Patient discharged home on Plavix, aspirin, Ranexa 500 twice daily, atorvastatin 80 mg daily, Toprol- mg daily.  During the hospital it was decided that unless she has persistent angina symptoms on maximal medical therapy, there are no plans for PCI.\par \par Patient now presents for a follow-up today.    Patient denies any symptoms suggestive of angina or CHF.  Patient is not very active at baseline.  Patient brought her medications to the office, her medications were updated in our system.  Patient states she has changed her primary care doctor from Gael to someone else she is not able to give me the name now\par \par \par

## 2023-06-13 ENCOUNTER — APPOINTMENT (OUTPATIENT)
Dept: ELECTROPHYSIOLOGY | Facility: CLINIC | Age: 73
End: 2023-06-13
Payer: MEDICARE

## 2023-06-13 ENCOUNTER — NON-APPOINTMENT (OUTPATIENT)
Age: 73
End: 2023-06-13

## 2023-06-13 VITALS
BODY MASS INDEX: 30.04 KG/M2 | WEIGHT: 149 LBS | HEIGHT: 59 IN | TEMPERATURE: 98.2 F | SYSTOLIC BLOOD PRESSURE: 152 MMHG | DIASTOLIC BLOOD PRESSURE: 82 MMHG | HEART RATE: 65 BPM | OXYGEN SATURATION: 95 %

## 2023-06-13 DIAGNOSIS — I44.2 ATRIOVENTRICULAR BLOCK, COMPLETE: ICD-10-CM

## 2023-06-13 PROCEDURE — 93279 PRGRMG DEV EVAL PM/LDLS PM: CPT

## 2023-06-13 PROCEDURE — 93000 ELECTROCARDIOGRAM COMPLETE: CPT | Mod: 59

## 2023-06-13 PROCEDURE — 99214 OFFICE O/P EST MOD 30 MIN: CPT | Mod: 25

## 2023-06-13 NOTE — HISTORY OF PRESENT ILLNESS
[FreeTextEntry1] : SHIREEN CASH is a 72 year old woman with hypertension, hyperlipidemia, diabetes mellitus, prior CVA, prior ABI/BSO, COVID-19 pneumonia (4/2020), RBBB and Mobitz II block s/p Micra AV leadless pacemaker who presents for pacemaker follow up.\par \par To summarize her history, she was admitted to [facility] in April 2020 with COVID-19 pneumonia. She was found to have Mobitz II block as well as symptoms of dyspnea on exertion as well as dizziness & lightheadedness with exertion. LVEF was found to be 65-70%. Troponins were negative. She underwent Micra AV leadless cardiac pacemaker.\par \par She followed up with Dr. Allen (her cardiologist) and had one pacemaker check in 6/2020. She did not have regular follow up. In 11/2021 she was found to have an NSTEMI and underwent cardiac catheterization which showed significant branch vessel disease and was treated with medical therapy. While admitted she was noted to have complete heart block with no underlying escape rhythm. Her device was reprogrammed to VDD with adequate AV synchrony.\par \par She was last seen at [address] [place] [address] Cardiology office where she had palpitations. Her Micra AV device was optimized.\par \par Today, she is doing well with no complaints. She states she had some dizziness today but could not describe why or when it occurred. She also has had ongoing chest pain which is unchanged. SHe deneis having any dyspnea.

## 2023-06-13 NOTE — REASON FOR VISIT
[Arrhythmia/ECG Abnorrmalities] : arrhythmia/ECG abnormalities [Source: ______] : History obtained from [unfilled] [FreeTextEntry3] : Dr. Allen

## 2023-06-13 NOTE — DISCUSSION/SUMMARY
[FreeTextEntry1] : SHIREEN CASH is a 72 year old woman with hypertension, hyperlipidemia, diabetes mellitus, prior CVA, prior ABI/BSO, COVID-19 pneumonia (4/2020), RBBB and Mobitz II block s/p Micra AV leadless pacemaker who presents for pacemaker follow up.\par \par She is doing well today with normal device function with very good AV synchrony (close to 80%). Will obtain TTE to ensure she has no new pacing induced cardiomyopathy.\par \par I briefly discussed remote monitoring with her but I am unsure if she fully understood it. I asked her to return in 6 months for device check with a family member so we can have a more in depth discussion regarding remote monitoring. She agreed.\par \par Recommendations:\par - Echo\par - Discuss remote monitoring at next visit\par \par Jason Montgomery MD, FACC, FHRS\par Clinical Cardiac Electrophysiology [EKG obtained to assist in diagnosis and management of assessed problem(s)] : EKG obtained to assist in diagnosis and management of assessed problem(s)

## 2023-06-13 NOTE — CARDIOLOGY SUMMARY
[de-identified] : \par 06/13/23: NSR with AS-.\par 12/23/21: NSR with LAE. AS- [de-identified] : \par 06/13/2020 Regadenoson NST: Normal perfusion imaging. LVEF >70%.\par  [de-identified] : \par 11/25/21 TTE: LVEF: 55-60%. Normal LA/RA. No valvular dysfunction\par  [de-identified] : \par Medtronic Micra AV (DOI: 04/17/2020)\par Interrogation 6/13/23: Normal device parameters. ~7.8 years of battery. AM- 72.3%,  only 27.7%.\par  [de-identified] : \par 11/26/2021: Severe atherosclerosis of dLAD. mLCx 50%. OM1 with 2 branches, 80% stenoses in both. LPL1 70% at ostium. Severe atherosclerosis of RPDA. Branch vessel and severe distal LAD and RPDA disease not suitable for PCI. Recommend medical therapy.\par

## 2023-06-13 NOTE — PHYSICAL EXAM
[Well Developed] : well developed [Well Nourished] : well nourished [No Acute Distress] : no acute distress [Normal Conjunctiva] : normal conjunctiva [No Respiratory Distress] : no respiratory distress  [Moves all extremities] : moves all extremities [No Focal Deficits] : no focal deficits [Alert and Oriented] : alert and oriented

## 2023-08-04 ENCOUNTER — APPOINTMENT (OUTPATIENT)
Dept: CARDIOLOGY | Facility: CLINIC | Age: 73
End: 2023-08-04

## 2023-09-08 NOTE — DISCHARGE NOTE NURSING/CASE MANAGEMENT/SOCIAL WORK - NSDCPELOVENOXFU_GEN_ALL_CORE
Continued Stay SW/CM Assessment/Plan of Care Note       Active Substitute Decision Maker (SDM)     Simeon Khouryrdo [facility] Agent 3 - Other   Primary Phone: [phone] (Home)                   Progress note:  Leg swelling/cardiogenic shock (EF 16%), NSTEMI  + LV Thrombus & LLE DVT  9/5. IR Thoracentesis   9/8. Cardiac Cath.    Rhythm sinus. On 02 at 2 L. Levo off. Heparin gtt. LLE + DVT.  IV Abx. Replace lytes prn. NPO for cardiac cath today. Uro, cards, & ID following,      Anticipate will need AC copay check at DC (On Heparin gtt). Await eRx. Will f/u OP for cysto for bladder lesion.     Update 1:00- Spoke w/ Dr LUISA Selby. Orders to transfer pt to [facility] for advanced Heart failure. Called command center, spoke w/ Lois RN. Verified pt accepted w/ Dr FARIHA Francois Hospitalist, and Dr FATMATA Mera Cardiology. Pt to transfer to [facility]- await available bed. [facility] contact info provided, [facility] RN will be notified when baeded available and command center will set up [facility] for transport at that time.      Hx of HTN. From home, lives w/ nephew & his wife, independent, ambulates w/ cane/walker. VM left for pts mikaela Hendrix to complete intake assessment/verify home status no PCP. [facility] Finder Hotline-  [phone] provided- AVS updated.      Medically cleared for transfer to tele- await available bed      See SW/CM flowsheets for other objective data.    Disposition Recommendations:  Preliminary discharge destination:    SW/CM recommendation for discharge:      Discharge Plan/Needs:     Continued Care and Services - Admitted Since 9/2/2023    Coordination has not been started for this encounter.           Devices/ Equipment that need to be arranged for discharge:     Accepted   Pending insurance authorization   Others:    Anticipated date of DME availability:     Prior To Hospitalization:    Living Situation: Family members (Nephew Simeon & his wife) and residing at House    .  Support Systems: Family members    Home Devices/Equipment: None ,   ,    ,      Mobility Assist Devices: None   Type of Service Prior to Hospitalization: None ,   ,   ,   ,    ,       Patient/Family discharge goal (s):        Resources provided:           Therapy Recommendations for Discharge:   PT:      Last Filed Values     None        OT:       Last Filed Values     None        SLP:    Last Filed Values     None          Mobility Equipment Recommended for Discharge:        Barriers to Discharge  Identified Barriers to Discharge/Transition Planning:                   Go for blood tests as directed. Your doctor will do lab tests at regular visits to monitor the effects of this medicine. Please follow up with your doctor and keep your health care provider appointments

## 2023-09-28 NOTE — ED ADULT NURSE NOTE - NSSISCREENINGQ4_ED_A_ED
Date of Service: 09/27/2023    PAIN CLINIC NOTE    CHIEF COMPLAINT:    Back pain.    HISTORY OF PRESENT ILLNESS:    Dionna returns 1 week after placement of trial spinal cord stim leads.  Dionna has significant depression and difficulty communicating at times.  Her staff at the care facility were queried every day and noticed a significant improvement in her attitude and her ability to move as did her brother who brought her in today.  She states that she had at least 50% pain relief with this treatment compared to prior to this treatment.    PHYSICAL EXAMINATION:    Her vital signs are stable.  Her leads were removed without difficulty.  There are no signs of infection.    IMPRESSION:    Post-laminectomy syndrome.    PLAN:    We will schedule a permanent implant in the near future.      Dictated By: Guru England MD  Signing Provider: Guru England MD    [facility]/lbi ([id number])   DD: 09/27/2023 12:51:07 PM TD: 09/28/2023 2:59:28 AM       No

## 2023-12-04 ENCOUNTER — NON-APPOINTMENT (OUTPATIENT)
Age: 73
End: 2023-12-04

## 2023-12-04 ENCOUNTER — APPOINTMENT (OUTPATIENT)
Dept: CARDIOLOGY | Facility: CLINIC | Age: 73
End: 2023-12-04
Payer: MEDICARE

## 2023-12-04 VITALS — SYSTOLIC BLOOD PRESSURE: 138 MMHG | HEART RATE: 65 BPM | OXYGEN SATURATION: 99 % | DIASTOLIC BLOOD PRESSURE: 74 MMHG

## 2023-12-04 VITALS — WEIGHT: 148 LBS | BODY MASS INDEX: 29.89 KG/M2

## 2023-12-04 PROCEDURE — 99214 OFFICE O/P EST MOD 30 MIN: CPT | Mod: 25

## 2023-12-04 PROCEDURE — 93000 ELECTROCARDIOGRAM COMPLETE: CPT

## 2023-12-04 RX ORDER — ASPIRIN 81 MG/1
81 TABLET, DELAYED RELEASE ORAL
Qty: 90 | Refills: 3 | Status: DISCONTINUED | COMMUNITY
Start: 2022-10-13 | End: 2023-12-04

## 2023-12-12 ENCOUNTER — APPOINTMENT (OUTPATIENT)
Dept: ELECTROPHYSIOLOGY | Facility: CLINIC | Age: 73
End: 2023-12-12

## 2023-12-21 NOTE — ED ADULT NURSE NOTE - CARDIO ASSESSMENT
WDL I will START or STAY ON the medications listed below when I get home from the hospital:    Motrin 600 mg oral tablet  -- 1 tab(s) by mouth 4 times a day as needed for prn pain  -- Indication: For vaginal delivery    Tylenol 500 mg oral tablet  -- 2 tab(s) by mouth 4 times a day as needed for prn pain  -- Indication: For vaginal delivery

## 2024-02-25 NOTE — H&P ADULT - PROBLEM SELECTOR PROBLEM 3
"[facility]    Hospitalist Progress Note    Date of Service (when I saw the patient): 02/25/2024    Assessment & Plan       Acute on chronic diastolic congestive heart failure (H): Net loss 2 liters and 3 pounds. Weaning down oxygen. Continue with IV bumex.       HTN (hypertension): Pressures so far stable      Schizophrenia (H)    Noncompliance with medication regimen: Frequent hospitalizations due to not taking her medications.      Rapid atrial fibrillation (H): Due to underlying CHF exacerbation and not taking her medications-resolved with diuresis and starting back her BB.      # Hypokalemia: Lowest K = 2.9 mmol/L in last 2 days, will replace as needed        # Drug Induced Coagulation Defect: home medication list includes an anticoagulant medication    # Hypertension: Noted on problem list  # Acute heart failure with preserved ejection fraction: heart failure noted on problem list, last echo with EF >50%, and receiving IV diuretics     # Obesity: Estimated body mass index is 39.76 kg/m  as calculated from the following:    Height as of this encounter: 1.499 m (4' 11\").    Weight as of this encounter: 89.3 kg (196 lb 13.9 oz).        # History of CABG: noted on surgical history       DVT Prophylaxis: Warfarin  Code Status: Full Code    Disposition: Expected discharge in 1-3 days once diuresed.    Claudia Chino CNP    Interval History   Denies sob,chest pain, abdominal pain, nausea.     -Data reviewed today: I reviewed all new labs and imaging results over the last 24 hours.     Physical Exam   Temp: 97  F (36.1  C) Temp src: Tympanic BP: 114/74 Pulse: 68   Resp: 20 SpO2: 94 % O2 Device: None (Room air)    Vitals:    02/24/24 1800 02/25/24 0430   Weight: 90.6 kg (199 lb 11.8 oz) 89.3 kg (196 lb 13.9 oz)     Vital Signs with Ranges  Temp:  [96.9  F (36.1  C)-97.6  F (36.4  C)] 97  F (36.1  C)  Pulse:  [] 68  Resp:  [20-24] 20  BP: (107-144)/() 114/74  SpO2:  [93 %-94 %] 94 %  I/O last 3 " completed shifts:  In: 450 [P.O.:450]  Out: 2150 [Urine:2150]    Peripheral IV 02/24/24 Right Lower forearm (Active)   Site Assessment WDL except;Ecchymotic 02/25/24 0857   Line Status Saline locked 02/25/24 0857   Dressing Transparent 02/25/24 0857   Dressing Status clean;dry;intact 02/25/24 0857   Dressing Intervention New dressing  02/24/24 2310   Line Intervention Flushed 02/25/24 0857   Phlebitis Scale 0-->no symptoms 02/25/24 0857   Infiltration? no 02/25/24 0857   Number of days: 1       Wound 07/11/20 Left Foot Other (comment) small hardened area on left heel (Active)   Number of days: 1324       Incision/Surgical Site 12/11/18 Right Chest (Active)   Number of days: 1902       Incision/Surgical Site 10/06/20 Right Chest (Active)   Number of days: 1237     Line/device assessment(s) completed for medical necessity    Constitutional: Awake,alert, no acute distress  Respiratory: Crackles right base, diminished left base, no wheezes, rhonchi  Cardiovascular: HRR, no murmurs, rubs, thrills   GI: Soft,nontender, bowel sounds positive  Skin/Integumen: No rashes, open areas or bruising  Other:      Medications    - MEDICATION INSTRUCTIONS -      - MEDICATION INSTRUCTIONS -        atorvastatin  40 mg Oral At Bedtime    bumetanide  2 mg Intravenous BID AC    busPIRone  10 mg Oral BID    cefTRIAXone  1 g Intravenous Q24H    diltiazem ER COATED BEADS  120 mg Oral Q24H    lisinopril  10 mg Oral Daily    metoprolol succinate ER  100 mg Oral Daily    pantoprazole  40 mg Oral QAM AC    potassium chloride ER  40 mEq Oral BID    sodium chloride (PF)  3 mL Intracatheter Q8H    warfarin ANTICOAGULANT  3 mg Oral ONCE at 18:00    Warfarin Therapy Reminder  1 each Oral See Admin Instructions       Data   Recent Labs   Lab 02/25/24  0557 02/24/24  1422   WBC 7.6 8.3   HGB 11.8 12.1   MCV 90 90    194   INR 1.84* 1.70*    135   POTASSIUM 2.9* 3.4   CHLORIDE 98 98   CO2 27 24   BUN 19.7 20.4   CR 0.97* 0.96*   ANIONGAP 11  13   CARINA 9.0 9.2   * 104*       Recent Results (from the past 24 hour(s))   POC US ABDOMEN LIMITED    Narrative    Pardeep Huston APRN CNP     2/24/2024  4:48 PM  POC US ABDOMEN LIMITED    Date/Time: 2/24/2024 1:55 PM    Performed by: Pardeep Huston APRN CNP  Authorized by: Pardeep Huston APRN CNP    Procedure details:     Left renal:  Visualized    Right renal:  Visualized    Bladder:  Visualized  Left renal findings:     Intra-abdominal fluid: not identified      Perinephric fluid: not identified      Hydronephrosis: none    Right renal findings:     Intra-abdominal fluid: not identified      Perinephric fluid: not identified      Hydronephrosis: none    Bladder findings:     Free pelvic fluid: not identified      Volume:  175   XR Chest 2 Views    Narrative    Exam:  XR CHEST 2 VIEWS    HISTORY: Shortness of breath.    COMPARISON:  2/5/2024, 9/21/2023    FINDINGS:     The cardiomediastinal contours are unchanged.      There are diffusely prominent interstitial markings with streaky  opacities in the mid to lower lungs bilaterally. Possible trace right  pleural effusion. No pneumothorax.      No acute osseous abnormality.       Impression    IMPRESSION:      Findings compatible with CHF/fluid overload.    Streaky opacities in the mid to lower lungs bilaterally may be due to  to edema, atelectasis, or possibly superimposed infection.      MORENA GARDNER MD         SYSTEM ID:  [id number]        Hypokalemia

## 2024-03-02 ENCOUNTER — INPATIENT (INPATIENT)
Facility: HOSPITAL | Age: 74
LOS: 1 days | Discharge: HOME | DRG: 690 | End: 2024-03-04
Attending: HOSPITALIST | Admitting: HOSPITALIST
Payer: MEDICARE

## 2024-03-02 VITALS
SYSTOLIC BLOOD PRESSURE: 144 MMHG | WEIGHT: 152.12 LBS | DIASTOLIC BLOOD PRESSURE: 79 MMHG | HEIGHT: 60 IN | HEART RATE: 78 BPM | TEMPERATURE: 97 F | RESPIRATION RATE: 16 BRPM

## 2024-03-02 DIAGNOSIS — Z86.011 PERSONAL HISTORY OF BENIGN NEOPLASM OF THE BRAIN: Chronic | ICD-10-CM

## 2024-03-02 LAB
ALBUMIN SERPL ELPH-MCNC: 4.1 G/DL — SIGNIFICANT CHANGE UP (ref 3.3–5.2)
ALP SERPL-CCNC: 71 U/L — SIGNIFICANT CHANGE UP (ref 40–120)
ALT FLD-CCNC: 24 U/L — SIGNIFICANT CHANGE UP
ANION GAP SERPL CALC-SCNC: 16 MMOL/L — SIGNIFICANT CHANGE UP (ref 5–17)
APPEARANCE UR: ABNORMAL
APTT BLD: 32.8 SEC — SIGNIFICANT CHANGE UP (ref 24.5–35.6)
AST SERPL-CCNC: 29 U/L — SIGNIFICANT CHANGE UP
BACTERIA # UR AUTO: ABNORMAL /HPF
BASOPHILS # BLD AUTO: 0.04 K/UL — SIGNIFICANT CHANGE UP (ref 0–0.2)
BASOPHILS NFR BLD AUTO: 0.4 % — SIGNIFICANT CHANGE UP (ref 0–2)
BILIRUB SERPL-MCNC: 0.5 MG/DL — SIGNIFICANT CHANGE UP (ref 0.4–2)
BILIRUB UR-MCNC: NEGATIVE — SIGNIFICANT CHANGE UP
BUN SERPL-MCNC: 22.6 MG/DL — HIGH (ref 8–20)
CALCIUM SERPL-MCNC: 8.9 MG/DL — SIGNIFICANT CHANGE UP (ref 8.4–10.5)
CAST: 10 /LPF — HIGH (ref 0–4)
CHLORIDE SERPL-SCNC: 100 MMOL/L — SIGNIFICANT CHANGE UP (ref 96–108)
CO2 SERPL-SCNC: 22 MMOL/L — SIGNIFICANT CHANGE UP (ref 22–29)
COLOR SPEC: YELLOW — SIGNIFICANT CHANGE UP
CREAT SERPL-MCNC: 1.52 MG/DL — HIGH (ref 0.5–1.3)
DIFF PNL FLD: ABNORMAL
EGFR: 36 ML/MIN/1.73M2 — LOW
EOSINOPHIL # BLD AUTO: 0.03 K/UL — SIGNIFICANT CHANGE UP (ref 0–0.5)
EOSINOPHIL NFR BLD AUTO: 0.3 % — SIGNIFICANT CHANGE UP (ref 0–6)
GLUCOSE SERPL-MCNC: 174 MG/DL — HIGH (ref 70–99)
GLUCOSE UR QL: NEGATIVE MG/DL — SIGNIFICANT CHANGE UP
HCT VFR BLD CALC: 34.6 % — SIGNIFICANT CHANGE UP (ref 34.5–45)
HGB BLD-MCNC: 11.6 G/DL — SIGNIFICANT CHANGE UP (ref 11.5–15.5)
IMM GRANULOCYTES NFR BLD AUTO: 0.4 % — SIGNIFICANT CHANGE UP (ref 0–0.9)
INR BLD: 1.12 RATIO — SIGNIFICANT CHANGE UP (ref 0.85–1.18)
KETONES UR-MCNC: NEGATIVE MG/DL — SIGNIFICANT CHANGE UP
LACTATE BLDV-MCNC: 2 MMOL/L — SIGNIFICANT CHANGE UP (ref 0.5–2)
LACTATE BLDV-MCNC: 2.9 MMOL/L — HIGH (ref 0.5–2)
LEUKOCYTE ESTERASE UR-ACNC: ABNORMAL
LYMPHOCYTES # BLD AUTO: 1.87 K/UL — SIGNIFICANT CHANGE UP (ref 1–3.3)
LYMPHOCYTES # BLD AUTO: 16.6 % — SIGNIFICANT CHANGE UP (ref 13–44)
MAGNESIUM SERPL-MCNC: 1.7 MG/DL — LOW (ref 1.8–2.6)
MCHC RBC-ENTMCNC: 29.7 PG — SIGNIFICANT CHANGE UP (ref 27–34)
MCHC RBC-ENTMCNC: 33.5 GM/DL — SIGNIFICANT CHANGE UP (ref 32–36)
MCV RBC AUTO: 88.5 FL — SIGNIFICANT CHANGE UP (ref 80–100)
MONOCYTES # BLD AUTO: 0.7 K/UL — SIGNIFICANT CHANGE UP (ref 0–0.9)
MONOCYTES NFR BLD AUTO: 6.2 % — SIGNIFICANT CHANGE UP (ref 2–14)
NEUTROPHILS # BLD AUTO: 8.59 K/UL — HIGH (ref 1.8–7.4)
NEUTROPHILS NFR BLD AUTO: 76.1 % — SIGNIFICANT CHANGE UP (ref 43–77)
NITRITE UR-MCNC: POSITIVE
NT-PROBNP SERPL-SCNC: 453 PG/ML — HIGH (ref 0–300)
PH UR: 6.5 — SIGNIFICANT CHANGE UP (ref 5–8)
PLATELET # BLD AUTO: 360 K/UL — SIGNIFICANT CHANGE UP (ref 150–400)
POTASSIUM SERPL-MCNC: 5.5 MMOL/L — HIGH (ref 3.5–5.3)
POTASSIUM SERPL-SCNC: 5.5 MMOL/L — HIGH (ref 3.5–5.3)
PROT SERPL-MCNC: 7 G/DL — SIGNIFICANT CHANGE UP (ref 6.6–8.7)
PROT UR-MCNC: 30 MG/DL
PROTHROM AB SERPL-ACNC: 12.4 SEC — SIGNIFICANT CHANGE UP (ref 9.5–13)
RBC # BLD: 3.91 M/UL — SIGNIFICANT CHANGE UP (ref 3.8–5.2)
RBC # FLD: 13.4 % — SIGNIFICANT CHANGE UP (ref 10.3–14.5)
RBC CASTS # UR COMP ASSIST: 2 /HPF — SIGNIFICANT CHANGE UP (ref 0–4)
SODIUM SERPL-SCNC: 137 MMOL/L — SIGNIFICANT CHANGE UP (ref 135–145)
SP GR SPEC: 1.01 — SIGNIFICANT CHANGE UP (ref 1–1.03)
SQUAMOUS # UR AUTO: 3 /HPF — SIGNIFICANT CHANGE UP (ref 0–5)
TROPONIN T, HIGH SENSITIVITY RESULT: 30 NG/L — SIGNIFICANT CHANGE UP (ref 0–51)
UROBILINOGEN FLD QL: 0.2 MG/DL — SIGNIFICANT CHANGE UP (ref 0.2–1)
WBC # BLD: 11.28 K/UL — HIGH (ref 3.8–10.5)
WBC # FLD AUTO: 11.28 K/UL — HIGH (ref 3.8–10.5)
WBC UR QL: >998 /HPF — HIGH (ref 0–5)

## 2024-03-02 PROCEDURE — 73630 X-RAY EXAM OF FOOT: CPT | Mod: 26,RT

## 2024-03-02 PROCEDURE — 76705 ECHO EXAM OF ABDOMEN: CPT | Mod: 26

## 2024-03-02 PROCEDURE — 73701 CT LOWER EXTREMITY W/DYE: CPT | Mod: 26,RT,MC

## 2024-03-02 PROCEDURE — 74177 CT ABD & PELVIS W/CONTRAST: CPT | Mod: 26,MC

## 2024-03-02 PROCEDURE — 93010 ELECTROCARDIOGRAM REPORT: CPT | Mod: 76

## 2024-03-02 PROCEDURE — 71045 X-RAY EXAM CHEST 1 VIEW: CPT | Mod: 26

## 2024-03-02 PROCEDURE — 99291 CRITICAL CARE FIRST HOUR: CPT

## 2024-03-02 RX ORDER — CEFTRIAXONE 500 MG/1
1000 INJECTION, POWDER, FOR SOLUTION INTRAMUSCULAR; INTRAVENOUS ONCE
Refills: 0 | Status: DISCONTINUED | OUTPATIENT
Start: 2024-03-02 | End: 2024-03-02

## 2024-03-02 RX ORDER — SODIUM CHLORIDE 9 MG/ML
2100 INJECTION, SOLUTION INTRAVENOUS ONCE
Refills: 0 | Status: COMPLETED | OUTPATIENT
Start: 2024-03-02 | End: 2024-03-02

## 2024-03-02 RX ORDER — CEFTRIAXONE 500 MG/1
1000 INJECTION, POWDER, FOR SOLUTION INTRAMUSCULAR; INTRAVENOUS ONCE
Refills: 0 | Status: COMPLETED | OUTPATIENT
Start: 2024-03-02 | End: 2024-03-02

## 2024-03-02 RX ORDER — VANCOMYCIN HCL 1 G
1000 VIAL (EA) INTRAVENOUS ONCE
Refills: 0 | Status: COMPLETED | OUTPATIENT
Start: 2024-03-02 | End: 2024-03-02

## 2024-03-02 RX ADMIN — SODIUM CHLORIDE 2100 MILLILITER(S): 9 INJECTION, SOLUTION INTRAVENOUS at 21:45

## 2024-03-02 RX ADMIN — Medication 250 MILLIGRAM(S): at 18:23

## 2024-03-02 RX ADMIN — SODIUM CHLORIDE 1050 MILLILITER(S): 9 INJECTION, SOLUTION INTRAVENOUS at 18:23

## 2024-03-02 RX ADMIN — CEFTRIAXONE 1000 MILLIGRAM(S): 500 INJECTION, POWDER, FOR SOLUTION INTRAMUSCULAR; INTRAVENOUS at 18:39

## 2024-03-02 NOTE — ED PROVIDER NOTE - PROGRESS NOTE DETAILS
Patient received as sign out. Currently w/o complaints. Vitals stable. Labs significant for UTI and BRADY. CT abd positive for hydronephrosis and distended bladder, as well as large amount of stool in the colon - could be cause of UTI. Patient also found to have distended gallbladder on CT abd. Surgery consulted. Recommend no intervention at this time - outpatient followup. Patient will be admitted for further treatment of BRADY and UTI.  Samer Buck Desai MD agree with resident follow up and assessement  - md jerry.

## 2024-03-02 NOTE — ED PROVIDER NOTE - NSICDXPASTMEDICALHX_GEN_ALL_CORE_FT
PAST MEDICAL HISTORY:  Cardiac pacemaker MICRA for mobitz type 11    CVA (cerebrovascular accident) resdiual right sided weakness    CVA (cerebrovascular accident)     DM (diabetes mellitus)     H/O gastroesophageal reflux (GERD)     HTN (hypertension)

## 2024-03-02 NOTE — ED ADULT NURSE REASSESSMENT NOTE - NS ED NURSE REASSESS COMMENT FT1
Assumed care of patient at approx 19:30. Patient AxOx4. Patient denies pain/discomfort at this time. Patient currently on cardiac monitor, NSR noted, SpO2 98% on room air. Patient has been updated on the plan of care. Patient offers no complaints at this time. No visible signs of acute distress noted, safety maintained. IVF infusing at this time. IV site dry and intact.

## 2024-03-02 NOTE — ED PROVIDER NOTE - CRITICAL CARE ATTENDING CONTRIBUTION TO CARE
Upon my evaluation, this patient had a high probability of imminent or life-threatening deterioration due to _SEPSIS___ , which required my direct attention, intervention, and personal management.    I have personally provided _40__ minutes of critical care time exclusive of time spent on separately billable procedures.  Time includes review of laboratory data, radiology results, discussion with consultants, and monitoring for potential decompensation.  Interventions were performed as documented.

## 2024-03-02 NOTE — ED PROVIDER NOTE - PHYSICAL EXAMINATION
Gen: Alert, NAD  Head: NC, AT, PERRL, EOMI, normal lids/conjunctiva  Neck: +supple, no tenderness/meningismus/JVD, +Trachea midline  Pulm: Bilateral BS, normal resp effort, no wheeze/stridor/retractions  CV: RRR, no M/R/G, +dist pulses  Abd: soft, ND, +BS, no hepatosplenomegaly. TTP @ suprapubic.   Mskel:    L UE: from/nt @shoulder/elbow/wrist/hand   R UE: from/nt @shoulder/elbow/wrist/hand   L LE: from/nt @ hip/knee/ankle   R LE: from/nt @hip/knee/ankle. ttp over 2-4 digit with erythema with purulent discharge in interweb spaces   distal pulses intact  Neuro: AAOx3, no sensory/motor deficit

## 2024-03-02 NOTE — ED PROVIDER NOTE - CLINICAL SUMMARY MEDICAL DECISION MAKING FREE TEXT BOX
73-year-old female; PMH significant for DM, HTN, HLD, GERD, CVA, Micra pacemaker; now presenting with chest pain–substernal, rating to right shoulder, associated with mild shortness of breath and palpitations and cough x 3 to 4 days.  Patient also complaining of dysuria frequency and urgency x 3 to 4 days.  Patient's granddaughter concerned that patient's right foot looks erythematous. will do labs, ct, abx, ivf, and re-eval.  1900: Patient signed out to incoming physician.  All decisions regarding the progression of care will be made at their discretion.

## 2024-03-02 NOTE — ED ADULT TRIAGE NOTE - CHIEF COMPLAINT QUOTE
Pt with substernal chest pain that started 1 hour ago while she was laying down. Pain is radiating to her back.

## 2024-03-02 NOTE — ED PROVIDER NOTE - NS ED ROS FT
Constitutional: (-) fever  (-)chills  (-)sweats  Eyes/ENT: (-)   Cardiovascular: (+) chest pain, (-) palpitations (-) edema   Respiratory: (-) cough, (-) shortness of breath   Gastrointestinal: (-)nausea  (-)vomiting, (-) diarrhea  (-) abdominal pain   :  (+)dysuria, (-)frequency, (-)urgency, (-)hematuria  Musculoskeletal: (-) neck pain, (-) back pain, (-) joint pain  Integumentary: (-) rash, (-) edema  Neurological: (-) headache, (-) altered mental status  (-)LOC Yes

## 2024-03-02 NOTE — ED PROVIDER NOTE - OBJECTIVE STATEMENT
73-year-old female; PMH significant for DM, HTN, HLD, GERD, CVA, Micra pacemaker; now presenting with chest pain–substernal, rating to right shoulder, associated with mild shortness of breath and palpitations and cough x 3 to 4 days.  Patient also complaining of dysuria frequency and urgency x 3 to 4 days.  Patient's granddaughter concerned that patient's right foot looks erythematous.

## 2024-03-02 NOTE — ED ADULT NURSE NOTE - OBJECTIVE STATEMENT
Pt presents to ED for chest pain starting yesterday while walking. Pt states pain has not subsided. Denies SOB or RAMIREZ. Pt also c/o burning with urination.

## 2024-03-03 DIAGNOSIS — N39.0 URINARY TRACT INFECTION, SITE NOT SPECIFIED: ICD-10-CM

## 2024-03-03 LAB
ANION GAP SERPL CALC-SCNC: 16 MMOL/L — SIGNIFICANT CHANGE UP (ref 5–17)
BUN SERPL-MCNC: 18 MG/DL — SIGNIFICANT CHANGE UP (ref 8–20)
CALCIUM SERPL-MCNC: 9.2 MG/DL — SIGNIFICANT CHANGE UP (ref 8.4–10.5)
CHLORIDE SERPL-SCNC: 102 MMOL/L — SIGNIFICANT CHANGE UP (ref 96–108)
CO2 SERPL-SCNC: 18 MMOL/L — LOW (ref 22–29)
CREAT SERPL-MCNC: 0.91 MG/DL — SIGNIFICANT CHANGE UP (ref 0.5–1.3)
EGFR: 67 ML/MIN/1.73M2 — SIGNIFICANT CHANGE UP
GLUCOSE BLDC GLUCOMTR-MCNC: 158 MG/DL — HIGH (ref 70–99)
GLUCOSE BLDC GLUCOMTR-MCNC: 160 MG/DL — HIGH (ref 70–99)
GLUCOSE BLDC GLUCOMTR-MCNC: 199 MG/DL — HIGH (ref 70–99)
GLUCOSE BLDC GLUCOMTR-MCNC: 207 MG/DL — HIGH (ref 70–99)
GLUCOSE SERPL-MCNC: 196 MG/DL — HIGH (ref 70–99)
HCT VFR BLD CALC: 34.4 % — LOW (ref 34.5–45)
HGB BLD-MCNC: 11.5 G/DL — SIGNIFICANT CHANGE UP (ref 11.5–15.5)
MCHC RBC-ENTMCNC: 29.6 PG — SIGNIFICANT CHANGE UP (ref 27–34)
MCHC RBC-ENTMCNC: 33.4 GM/DL — SIGNIFICANT CHANGE UP (ref 32–36)
MCV RBC AUTO: 88.7 FL — SIGNIFICANT CHANGE UP (ref 80–100)
PLATELET # BLD AUTO: 376 K/UL — SIGNIFICANT CHANGE UP (ref 150–400)
POTASSIUM SERPL-MCNC: 4 MMOL/L — SIGNIFICANT CHANGE UP (ref 3.5–5.3)
POTASSIUM SERPL-SCNC: 4 MMOL/L — SIGNIFICANT CHANGE UP (ref 3.5–5.3)
RAPID RVP RESULT: SIGNIFICANT CHANGE UP
RBC # BLD: 3.88 M/UL — SIGNIFICANT CHANGE UP (ref 3.8–5.2)
RBC # FLD: 13.4 % — SIGNIFICANT CHANGE UP (ref 10.3–14.5)
SARS-COV-2 RNA SPEC QL NAA+PROBE: SIGNIFICANT CHANGE UP
SODIUM SERPL-SCNC: 136 MMOL/L — SIGNIFICANT CHANGE UP (ref 135–145)
TROPONIN T, HIGH SENSITIVITY RESULT: 35 NG/L — SIGNIFICANT CHANGE UP (ref 0–51)
TROPONIN T, HIGH SENSITIVITY RESULT: 38 NG/L — SIGNIFICANT CHANGE UP (ref 0–51)
WBC # BLD: 9.12 K/UL — SIGNIFICANT CHANGE UP (ref 3.8–10.5)
WBC # FLD AUTO: 9.12 K/UL — SIGNIFICANT CHANGE UP (ref 3.8–10.5)

## 2024-03-03 PROCEDURE — 99223 1ST HOSP IP/OBS HIGH 75: CPT

## 2024-03-03 RX ORDER — LACTULOSE 10 G/15ML
10 SOLUTION ORAL EVERY 8 HOURS
Refills: 0 | Status: DISCONTINUED | OUTPATIENT
Start: 2024-03-03 | End: 2024-03-04

## 2024-03-03 RX ORDER — LANOLIN ALCOHOL/MO/W.PET/CERES
3 CREAM (GRAM) TOPICAL AT BEDTIME
Refills: 0 | Status: DISCONTINUED | OUTPATIENT
Start: 2024-03-03 | End: 2024-03-04

## 2024-03-03 RX ORDER — ONDANSETRON 8 MG/1
4 TABLET, FILM COATED ORAL EVERY 8 HOURS
Refills: 0 | Status: DISCONTINUED | OUTPATIENT
Start: 2024-03-03 | End: 2024-03-04

## 2024-03-03 RX ORDER — HYDRALAZINE HCL 50 MG
5 TABLET ORAL EVERY 4 HOURS
Refills: 0 | Status: DISCONTINUED | OUTPATIENT
Start: 2024-03-03 | End: 2024-03-04

## 2024-03-03 RX ORDER — DEXTROSE 50 % IN WATER 50 %
25 SYRINGE (ML) INTRAVENOUS ONCE
Refills: 0 | Status: DISCONTINUED | OUTPATIENT
Start: 2024-03-03 | End: 2024-03-04

## 2024-03-03 RX ORDER — CEFTRIAXONE 500 MG/1
1000 INJECTION, POWDER, FOR SOLUTION INTRAMUSCULAR; INTRAVENOUS EVERY 24 HOURS
Refills: 0 | Status: DISCONTINUED | OUTPATIENT
Start: 2024-03-03 | End: 2024-03-04

## 2024-03-03 RX ORDER — GLUCAGON INJECTION, SOLUTION 0.5 MG/.1ML
1 INJECTION, SOLUTION SUBCUTANEOUS ONCE
Refills: 0 | Status: DISCONTINUED | OUTPATIENT
Start: 2024-03-03 | End: 2024-03-04

## 2024-03-03 RX ORDER — DEXTROSE 50 % IN WATER 50 %
15 SYRINGE (ML) INTRAVENOUS ONCE
Refills: 0 | Status: DISCONTINUED | OUTPATIENT
Start: 2024-03-03 | End: 2024-03-04

## 2024-03-03 RX ORDER — HEPARIN SODIUM 5000 [USP'U]/ML
5000 INJECTION INTRAVENOUS; SUBCUTANEOUS EVERY 12 HOURS
Refills: 0 | Status: DISCONTINUED | OUTPATIENT
Start: 2024-03-03 | End: 2024-03-04

## 2024-03-03 RX ORDER — OXYBUTYNIN CHLORIDE 5 MG
10 TABLET ORAL DAILY
Refills: 0 | Status: DISCONTINUED | OUTPATIENT
Start: 2024-03-03 | End: 2024-03-03

## 2024-03-03 RX ORDER — OXYBUTYNIN CHLORIDE 5 MG
10 TABLET ORAL DAILY
Refills: 0 | Status: DISCONTINUED | OUTPATIENT
Start: 2024-03-03 | End: 2024-03-04

## 2024-03-03 RX ORDER — SENNA PLUS 8.6 MG/1
2 TABLET ORAL AT BEDTIME
Refills: 0 | Status: DISCONTINUED | OUTPATIENT
Start: 2024-03-03 | End: 2024-03-04

## 2024-03-03 RX ORDER — SODIUM CHLORIDE 9 MG/ML
1000 INJECTION, SOLUTION INTRAVENOUS
Refills: 0 | Status: DISCONTINUED | OUTPATIENT
Start: 2024-03-03 | End: 2024-03-04

## 2024-03-03 RX ORDER — ASPIRIN/CALCIUM CARB/MAGNESIUM 324 MG
81 TABLET ORAL DAILY
Refills: 0 | Status: DISCONTINUED | OUTPATIENT
Start: 2024-03-03 | End: 2024-03-04

## 2024-03-03 RX ORDER — AMLODIPINE BESYLATE 2.5 MG/1
10 TABLET ORAL DAILY
Refills: 0 | Status: DISCONTINUED | OUTPATIENT
Start: 2024-03-03 | End: 2024-03-04

## 2024-03-03 RX ORDER — INSULIN GLARGINE 100 [IU]/ML
26 INJECTION, SOLUTION SUBCUTANEOUS AT BEDTIME
Refills: 0 | Status: DISCONTINUED | OUTPATIENT
Start: 2024-03-03 | End: 2024-03-04

## 2024-03-03 RX ORDER — PANTOPRAZOLE SODIUM 20 MG/1
40 TABLET, DELAYED RELEASE ORAL
Refills: 0 | Status: DISCONTINUED | OUTPATIENT
Start: 2024-03-03 | End: 2024-03-04

## 2024-03-03 RX ORDER — ATORVASTATIN CALCIUM 80 MG/1
80 TABLET, FILM COATED ORAL AT BEDTIME
Refills: 0 | Status: DISCONTINUED | OUTPATIENT
Start: 2024-03-03 | End: 2024-03-04

## 2024-03-03 RX ORDER — MAGNESIUM SULFATE 500 MG/ML
2 VIAL (ML) INJECTION ONCE
Refills: 0 | Status: COMPLETED | OUTPATIENT
Start: 2024-03-03 | End: 2024-03-03

## 2024-03-03 RX ORDER — METOPROLOL TARTRATE 50 MG
100 TABLET ORAL DAILY
Refills: 0 | Status: DISCONTINUED | OUTPATIENT
Start: 2024-03-03 | End: 2024-03-04

## 2024-03-03 RX ORDER — ISOSORBIDE MONONITRATE 60 MG/1
30 TABLET, EXTENDED RELEASE ORAL DAILY
Refills: 0 | Status: DISCONTINUED | OUTPATIENT
Start: 2024-03-03 | End: 2024-03-04

## 2024-03-03 RX ORDER — ACETAMINOPHEN 500 MG
650 TABLET ORAL EVERY 6 HOURS
Refills: 0 | Status: DISCONTINUED | OUTPATIENT
Start: 2024-03-03 | End: 2024-03-04

## 2024-03-03 RX ORDER — INSULIN LISPRO 100/ML
VIAL (ML) SUBCUTANEOUS
Refills: 0 | Status: DISCONTINUED | OUTPATIENT
Start: 2024-03-03 | End: 2024-03-04

## 2024-03-03 RX ORDER — VALSARTAN 80 MG/1
160 TABLET ORAL DAILY
Refills: 0 | Status: DISCONTINUED | OUTPATIENT
Start: 2024-03-03 | End: 2024-03-04

## 2024-03-03 RX ORDER — POLYETHYLENE GLYCOL 3350 17 G/17G
17 POWDER, FOR SOLUTION ORAL DAILY
Refills: 0 | Status: DISCONTINUED | OUTPATIENT
Start: 2024-03-03 | End: 2024-03-04

## 2024-03-03 RX ORDER — GABAPENTIN 400 MG/1
800 CAPSULE ORAL DAILY
Refills: 0 | Status: DISCONTINUED | OUTPATIENT
Start: 2024-03-03 | End: 2024-03-04

## 2024-03-03 RX ORDER — CLOPIDOGREL BISULFATE 75 MG/1
75 TABLET, FILM COATED ORAL DAILY
Refills: 0 | Status: DISCONTINUED | OUTPATIENT
Start: 2024-03-03 | End: 2024-03-04

## 2024-03-03 RX ORDER — VALSARTAN 80 MG/1
0 TABLET ORAL
Refills: 0 | DISCHARGE

## 2024-03-03 RX ORDER — DEXTROSE 50 % IN WATER 50 %
12.5 SYRINGE (ML) INTRAVENOUS ONCE
Refills: 0 | Status: DISCONTINUED | OUTPATIENT
Start: 2024-03-03 | End: 2024-03-04

## 2024-03-03 RX ORDER — RANOLAZINE 500 MG/1
500 TABLET, FILM COATED, EXTENDED RELEASE ORAL
Refills: 0 | Status: DISCONTINUED | OUTPATIENT
Start: 2024-03-03 | End: 2024-03-04

## 2024-03-03 RX ADMIN — Medication 5 MILLIGRAM(S): at 22:33

## 2024-03-03 RX ADMIN — AMLODIPINE BESYLATE 10 MILLIGRAM(S): 2.5 TABLET ORAL at 06:30

## 2024-03-03 RX ADMIN — LACTULOSE 10 GRAM(S): 10 SOLUTION ORAL at 22:00

## 2024-03-03 RX ADMIN — INSULIN GLARGINE 26 UNIT(S): 100 INJECTION, SOLUTION SUBCUTANEOUS at 22:01

## 2024-03-03 RX ADMIN — Medication 25 GRAM(S): at 05:21

## 2024-03-03 RX ADMIN — Medication 1: at 08:49

## 2024-03-03 RX ADMIN — CEFTRIAXONE 1000 MILLIGRAM(S): 500 INJECTION, POWDER, FOR SOLUTION INTRAMUSCULAR; INTRAVENOUS at 17:53

## 2024-03-03 RX ADMIN — RANOLAZINE 500 MILLIGRAM(S): 500 TABLET, FILM COATED, EXTENDED RELEASE ORAL at 17:53

## 2024-03-03 RX ADMIN — Medication 1: at 17:54

## 2024-03-03 RX ADMIN — PANTOPRAZOLE SODIUM 40 MILLIGRAM(S): 20 TABLET, DELAYED RELEASE ORAL at 06:32

## 2024-03-03 RX ADMIN — HEPARIN SODIUM 5000 UNIT(S): 5000 INJECTION INTRAVENOUS; SUBCUTANEOUS at 06:29

## 2024-03-03 RX ADMIN — Medication 81 MILLIGRAM(S): at 11:45

## 2024-03-03 RX ADMIN — SENNA PLUS 2 TABLET(S): 8.6 TABLET ORAL at 22:00

## 2024-03-03 RX ADMIN — ISOSORBIDE MONONITRATE 30 MILLIGRAM(S): 60 TABLET, EXTENDED RELEASE ORAL at 11:45

## 2024-03-03 RX ADMIN — Medication 1: at 12:07

## 2024-03-03 RX ADMIN — RANOLAZINE 500 MILLIGRAM(S): 500 TABLET, FILM COATED, EXTENDED RELEASE ORAL at 06:29

## 2024-03-03 RX ADMIN — Medication 10 MILLIGRAM(S): at 11:45

## 2024-03-03 RX ADMIN — ATORVASTATIN CALCIUM 80 MILLIGRAM(S): 80 TABLET, FILM COATED ORAL at 22:01

## 2024-03-03 RX ADMIN — CLOPIDOGREL BISULFATE 75 MILLIGRAM(S): 75 TABLET, FILM COATED ORAL at 11:45

## 2024-03-03 RX ADMIN — VALSARTAN 160 MILLIGRAM(S): 80 TABLET ORAL at 06:30

## 2024-03-03 RX ADMIN — Medication 100 MILLIGRAM(S): at 06:30

## 2024-03-03 RX ADMIN — GABAPENTIN 800 MILLIGRAM(S): 400 CAPSULE ORAL at 11:45

## 2024-03-03 RX ADMIN — HEPARIN SODIUM 5000 UNIT(S): 5000 INJECTION INTRAVENOUS; SUBCUTANEOUS at 17:53

## 2024-03-03 NOTE — H&P ADULT - HISTORY OF PRESENT ILLNESS
74yo F with PMHx of CAD s/p stent, CVA, HB s/p PPM, IDDM, HTN, GERD presented to ED c/o chest pain and dysuria. Pt reports to chest pain–substernal, rating to right shoulder, associated with mild shortness of breath and palpitations and cough x 3 to 4 days.  Patient also complaining of dysuria frequency and urgency x 3 to 4 days. Denies fever, chills, abdominal pain, n/v. In the ED found to have positive UA, CT showed b/l mild hydronephrosis, CT also pertinent for large stool in the colon and distended GB, pt was evaluated by surgery team no acute surgical intervention.

## 2024-03-03 NOTE — H&P ADULT - ASSESSMENT
72yo F with PMHx of CAD s/p stent, CVA, HB s/p PPM, IDDM, HTN, GERD presented to ED c/o chest pain and dysuria.    UTI   -UA positive, afebrile, no leukocytosis   -Cont with Ceftriaxone   -check urine cultures and sensitivity     ARF with mild b/l hydronephrosis   -likely due to UTI   -received 2L IVF bolus   -monitor renal function closely   -cont ABX     Cholelithiases  -distended GB on US and CT  -afebrile, no leukocytosis, LFT wnl   -Surgical consulted noted, no acute surgical intervention  -cont monitor     Constipation   -large stool within colon on CT   -placed on bowel regiment     CAD  -cont plavix, ASA, Atorvastatin     IDDM  -cont Lantus 26u QHS   -ISS, hypoglycemic protocol   -check A1c     HTN  -cont valsartan, amlodipine and metoprolol    DVT ppx  -Heaprin subQ

## 2024-03-03 NOTE — CONSULT NOTE ADULT - ATTENDING COMMENTS
Seen and examined in ER. Co-clinician native Turkish speaker was present and assisted with interview.   Patient reports ~6months of UTI; presented w/ suprapubic/lower abdominal pain x5 days, new R flank pain x1 day.     On exam: abdomen soft, ND, +epigastric, suprapubic, and R CVA TTP.     No evidence of cholecystitis on imaging. Does have mild hydronephrosis.     **HPI and exams do not indicate cholecystitis.     --No surgical intervention indicated at this time.   --May obtain other imaging, including HIDA is still concerned for possible cholecystitis.   --ER/primary team should assess for cystitis, pyelo.

## 2024-03-03 NOTE — CONSULT NOTE ADULT - ASSESSMENT
ASSESSMENT: 73-year-old female; PMH significant for DM, HTN, HLD, GERD, CVA, Micra pacemaker presenting of 5 days of lower abdominal pain and 1 day of chest pain , WBC 11 , with cholelithiasis however no evidence of acute cholecystitis on CT scan / US     PLAN:    - no acute surgical intervention from general surgery standpoint   - patient to follow up outpatient with Dr Joyner   - examined and discussed  with Attending, Dr. Joyner        ASSESSMENT: 73-year-old female; PMH significant for DM, HTN, HLD, GERD, CVA, Micra pacemaker presenting of 5 days of lower abdominal pain and 1 day of chest pain , WBC 11 , with cholelithiasis however no evidence of acute cholecystitis on CT scan / US     PLAN:    - no acute surgical intervention from general surgery standpoint   - patient to follow up outpatient with Dr Joyner   - rest of care by primary team   - examined and discussed  with Attending, Dr. Joyner        ASSESSMENT: 73-year-old female; PMH significant for DM, HTN, HLD, GERD, CVA, Micra pacemaker presenting of 5 days of lower abdominal pain and 1 day of chest pain , WBC 11 , with cholelithiasis however no evidence of acute cholecystitis on CT scan / US     PLAN:    - no acute surgical intervention from general surgery standpoint   - rest of care by primary team   - examined and discussed  with Attending, Dr. Joyner

## 2024-03-03 NOTE — CHART NOTE - NSCHARTNOTEFT_GEN_A_CORE
patient seen at bedside with , patient deneis any complaints. patient is awake and alert.     a/p  74yo F with PMHx of CAD s/p stent, CVA, HB s/p PPM, IDDM, HTN, GERD presented to ED c/o chest pain and dysuria.    UTI   -Cont with Ceftriaxone     ARF with mild b/l hydronephrosis   -resolved    Cholelithiases  -distended GB on US and CT  -afebrile, no leukocytosis, LFT wnl   -Surgical consulted noted, no acute surgical intervention  -cont monitor     Constipation   -large stool within colon on CT   -placed on bowel regiment     CAD  -cont plavix, ASA, Atorvastatin     IDDM  -cont Lantus 26u QHS   -ISS, hypoglycemic protocol   -check A1c     HTN  -cont valsartan, amlodipine and metoprolol    DVT ppx  -Heaprin subQ    dispo - dc in 1-2 days

## 2024-03-03 NOTE — PATIENT PROFILE ADULT - FALL HARM RISK - UNIVERSAL INTERVENTIONS
Bed in lowest position, wheels locked, appropriate side rails in place/Call bell, personal items and telephone in reach/Instruct patient to call for assistance before getting out of bed or chair/Non-slip footwear when patient is out of bed/[place] to call system/Physically safe environment - no spills, clutter or unnecessary equipment/Purposeful Proactive Rounding/Room/bathroom lighting operational, light cord in reach

## 2024-03-03 NOTE — H&P ADULT - NSHPPHYSICALEXAM_GEN_ALL_CORE
T(C): 36.4 (03-03-24 @ 04:25), Max: 36.7 (03-02-24 @ 21:45)  HR: 73 (03-03-24 @ 04:25) (64 - 78)  BP: 163/77 (03-03-24 @ 04:25) (144/79 - 181/82)  RR: 17 (03-03-24 @ 04:25) (16 - 18)  SpO2: 97% (03-03-24 @ 04:25) (95% - 97%)    GENERAL: patient appears well, no acute distress, appropriate, pleasant  EYES: sclera clear, no exudates  ENMT: oropharynx clear without erythema, no exudates, moist mucous membranes  NECK: supple, soft, no thyromegaly noted  LUNGS: good air entry bilaterally, clear to auscultation, symmetric breath sounds, no wheezing or rhonchi appreciated  HEART: soft S1/S2, regular rate and rhythm, no murmurs noted, no lower extremity edema  GASTROINTESTINAL: abdomen is soft, nontender, nondistended, normoactive bowel sounds, no palpable masses  INTEGUMENT: good skin turgor, warm skin, appears well perfused  MUSCULOSKELETAL: no clubbing or cyanosis, no obvious deformity  NEUROLOGIC: awake, alert, oriented x3, good muscle tone in 4 extremities, no obvious sensory deficits  PSYCHIATRIC: mood is good, affect is congruent, linear and logical thought process  HEME/LYMPH: no palpable supraclavicular nodules, no obvious ecchymosis or petechiae

## 2024-03-04 ENCOUNTER — TRANSCRIPTION ENCOUNTER (OUTPATIENT)
Age: 74
End: 2024-03-04

## 2024-03-04 VITALS
HEART RATE: 66 BPM | DIASTOLIC BLOOD PRESSURE: 78 MMHG | OXYGEN SATURATION: 96 % | RESPIRATION RATE: 18 BRPM | SYSTOLIC BLOOD PRESSURE: 155 MMHG | TEMPERATURE: 98 F

## 2024-03-04 LAB
A1C WITH ESTIMATED AVERAGE GLUCOSE RESULT: 7.2 % — HIGH (ref 4–5.6)
ALBUMIN SERPL ELPH-MCNC: 3.9 G/DL — SIGNIFICANT CHANGE UP (ref 3.3–5.2)
ALP SERPL-CCNC: 67 U/L — SIGNIFICANT CHANGE UP (ref 40–120)
ALT FLD-CCNC: 19 U/L — SIGNIFICANT CHANGE UP
ANION GAP SERPL CALC-SCNC: 16 MMOL/L — SIGNIFICANT CHANGE UP (ref 5–17)
AST SERPL-CCNC: 18 U/L — SIGNIFICANT CHANGE UP
BASOPHILS # BLD AUTO: 0.07 K/UL — SIGNIFICANT CHANGE UP (ref 0–0.2)
BASOPHILS NFR BLD AUTO: 0.8 % — SIGNIFICANT CHANGE UP (ref 0–2)
BILIRUB SERPL-MCNC: 0.5 MG/DL — SIGNIFICANT CHANGE UP (ref 0.4–2)
BUN SERPL-MCNC: 23 MG/DL — HIGH (ref 8–20)
CALCIUM SERPL-MCNC: 9.3 MG/DL — SIGNIFICANT CHANGE UP (ref 8.4–10.5)
CHLORIDE SERPL-SCNC: 102 MMOL/L — SIGNIFICANT CHANGE UP (ref 96–108)
CO2 SERPL-SCNC: 20 MMOL/L — LOW (ref 22–29)
CREAT SERPL-MCNC: 1.17 MG/DL — SIGNIFICANT CHANGE UP (ref 0.5–1.3)
EGFR: 50 ML/MIN/1.73M2 — LOW
EOSINOPHIL # BLD AUTO: 0.29 K/UL — SIGNIFICANT CHANGE UP (ref 0–0.5)
EOSINOPHIL NFR BLD AUTO: 3.2 % — SIGNIFICANT CHANGE UP (ref 0–6)
ESTIMATED AVERAGE GLUCOSE: 160 MG/DL — HIGH (ref 68–114)
GLUCOSE BLDC GLUCOMTR-MCNC: 151 MG/DL — HIGH (ref 70–99)
GLUCOSE BLDC GLUCOMTR-MCNC: 173 MG/DL — HIGH (ref 70–99)
GLUCOSE SERPL-MCNC: 190 MG/DL — HIGH (ref 70–99)
HCT VFR BLD CALC: 34.8 % — SIGNIFICANT CHANGE UP (ref 34.5–45)
HGB BLD-MCNC: 11.8 G/DL — SIGNIFICANT CHANGE UP (ref 11.5–15.5)
IMM GRANULOCYTES NFR BLD AUTO: 0.3 % — SIGNIFICANT CHANGE UP (ref 0–0.9)
LYMPHOCYTES # BLD AUTO: 3.6 K/UL — HIGH (ref 1–3.3)
LYMPHOCYTES # BLD AUTO: 40 % — SIGNIFICANT CHANGE UP (ref 13–44)
MAGNESIUM SERPL-MCNC: 2 MG/DL — SIGNIFICANT CHANGE UP (ref 1.6–2.6)
MCHC RBC-ENTMCNC: 29.6 PG — SIGNIFICANT CHANGE UP (ref 27–34)
MCHC RBC-ENTMCNC: 33.9 GM/DL — SIGNIFICANT CHANGE UP (ref 32–36)
MCV RBC AUTO: 87.4 FL — SIGNIFICANT CHANGE UP (ref 80–100)
MONOCYTES # BLD AUTO: 0.85 K/UL — SIGNIFICANT CHANGE UP (ref 0–0.9)
MONOCYTES NFR BLD AUTO: 9.4 % — SIGNIFICANT CHANGE UP (ref 2–14)
NEUTROPHILS # BLD AUTO: 4.16 K/UL — SIGNIFICANT CHANGE UP (ref 1.8–7.4)
NEUTROPHILS NFR BLD AUTO: 46.3 % — SIGNIFICANT CHANGE UP (ref 43–77)
PLATELET # BLD AUTO: 335 K/UL — SIGNIFICANT CHANGE UP (ref 150–400)
POTASSIUM SERPL-MCNC: 4.1 MMOL/L — SIGNIFICANT CHANGE UP (ref 3.5–5.3)
POTASSIUM SERPL-SCNC: 4.1 MMOL/L — SIGNIFICANT CHANGE UP (ref 3.5–5.3)
PROT SERPL-MCNC: 6.6 G/DL — SIGNIFICANT CHANGE UP (ref 6.6–8.7)
RBC # BLD: 3.98 M/UL — SIGNIFICANT CHANGE UP (ref 3.8–5.2)
RBC # FLD: 13.2 % — SIGNIFICANT CHANGE UP (ref 10.3–14.5)
SODIUM SERPL-SCNC: 138 MMOL/L — SIGNIFICANT CHANGE UP (ref 135–145)
WBC # BLD: 9 K/UL — SIGNIFICANT CHANGE UP (ref 3.8–10.5)
WBC # FLD AUTO: 9 K/UL — SIGNIFICANT CHANGE UP (ref 3.8–10.5)

## 2024-03-04 PROCEDURE — 73630 X-RAY EXAM OF FOOT: CPT

## 2024-03-04 PROCEDURE — 82962 GLUCOSE BLOOD TEST: CPT

## 2024-03-04 PROCEDURE — T1013: CPT

## 2024-03-04 PROCEDURE — 81001 URINALYSIS AUTO W/SCOPE: CPT

## 2024-03-04 PROCEDURE — 85730 THROMBOPLASTIN TIME PARTIAL: CPT

## 2024-03-04 PROCEDURE — 99291 CRITICAL CARE FIRST HOUR: CPT | Mod: 25

## 2024-03-04 PROCEDURE — 93005 ELECTROCARDIOGRAM TRACING: CPT

## 2024-03-04 PROCEDURE — 87040 BLOOD CULTURE FOR BACTERIA: CPT

## 2024-03-04 PROCEDURE — 71045 X-RAY EXAM CHEST 1 VIEW: CPT

## 2024-03-04 PROCEDURE — 76705 ECHO EXAM OF ABDOMEN: CPT

## 2024-03-04 PROCEDURE — 84484 ASSAY OF TROPONIN QUANT: CPT

## 2024-03-04 PROCEDURE — 0225U NFCT DS DNA&RNA 21 SARSCOV2: CPT

## 2024-03-04 PROCEDURE — 80048 BASIC METABOLIC PNL TOTAL CA: CPT

## 2024-03-04 PROCEDURE — 85027 COMPLETE CBC AUTOMATED: CPT

## 2024-03-04 PROCEDURE — 96374 THER/PROPH/DIAG INJ IV PUSH: CPT

## 2024-03-04 PROCEDURE — 96375 TX/PRO/DX INJ NEW DRUG ADDON: CPT

## 2024-03-04 PROCEDURE — 80053 COMPREHEN METABOLIC PANEL: CPT

## 2024-03-04 PROCEDURE — 83880 ASSAY OF NATRIURETIC PEPTIDE: CPT

## 2024-03-04 PROCEDURE — 83036 HEMOGLOBIN GLYCOSYLATED A1C: CPT

## 2024-03-04 PROCEDURE — 87086 URINE CULTURE/COLONY COUNT: CPT

## 2024-03-04 PROCEDURE — 83605 ASSAY OF LACTIC ACID: CPT

## 2024-03-04 PROCEDURE — 99239 HOSP IP/OBS DSCHRG MGMT >30: CPT

## 2024-03-04 PROCEDURE — 83735 ASSAY OF MAGNESIUM: CPT

## 2024-03-04 PROCEDURE — 85025 COMPLETE CBC W/AUTO DIFF WBC: CPT

## 2024-03-04 PROCEDURE — 87186 SC STD MICRODIL/AGAR DIL: CPT

## 2024-03-04 PROCEDURE — 74177 CT ABD & PELVIS W/CONTRAST: CPT | Mod: MC

## 2024-03-04 PROCEDURE — 36415 COLL VENOUS BLD VENIPUNCTURE: CPT

## 2024-03-04 PROCEDURE — 85610 PROTHROMBIN TIME: CPT

## 2024-03-04 PROCEDURE — 73701 CT LOWER EXTREMITY W/DYE: CPT | Mod: MC

## 2024-03-04 RX ORDER — CEFPODOXIME PROXETIL 100 MG
1 TABLET ORAL
Qty: 12 | Refills: 0
Start: 2024-03-04 | End: 2024-03-09

## 2024-03-04 RX ORDER — PANTOPRAZOLE SODIUM 20 MG/1
1 TABLET, DELAYED RELEASE ORAL
Qty: 30 | Refills: 0
Start: 2024-03-04 | End: 2024-04-02

## 2024-03-04 RX ORDER — METFORMIN HYDROCHLORIDE 850 MG/1
1 TABLET ORAL
Qty: 60 | Refills: 0
Start: 2024-03-04 | End: 2024-04-02

## 2024-03-04 RX ADMIN — CLOPIDOGREL BISULFATE 75 MILLIGRAM(S): 75 TABLET, FILM COATED ORAL at 11:37

## 2024-03-04 RX ADMIN — Medication 650 MILLIGRAM(S): at 08:38

## 2024-03-04 RX ADMIN — Medication 81 MILLIGRAM(S): at 11:37

## 2024-03-04 RX ADMIN — ISOSORBIDE MONONITRATE 30 MILLIGRAM(S): 60 TABLET, EXTENDED RELEASE ORAL at 11:37

## 2024-03-04 RX ADMIN — Medication 1: at 08:41

## 2024-03-04 RX ADMIN — HEPARIN SODIUM 5000 UNIT(S): 5000 INJECTION INTRAVENOUS; SUBCUTANEOUS at 05:15

## 2024-03-04 RX ADMIN — AMLODIPINE BESYLATE 10 MILLIGRAM(S): 2.5 TABLET ORAL at 05:16

## 2024-03-04 RX ADMIN — Medication 650 MILLIGRAM(S): at 09:38

## 2024-03-04 RX ADMIN — Medication 100 MILLIGRAM(S): at 05:16

## 2024-03-04 RX ADMIN — RANOLAZINE 500 MILLIGRAM(S): 500 TABLET, FILM COATED, EXTENDED RELEASE ORAL at 05:16

## 2024-03-04 RX ADMIN — VALSARTAN 160 MILLIGRAM(S): 80 TABLET ORAL at 05:16

## 2024-03-04 RX ADMIN — PANTOPRAZOLE SODIUM 40 MILLIGRAM(S): 20 TABLET, DELAYED RELEASE ORAL at 05:16

## 2024-03-04 RX ADMIN — LACTULOSE 10 GRAM(S): 10 SOLUTION ORAL at 05:16

## 2024-03-04 RX ADMIN — POLYETHYLENE GLYCOL 3350 17 GRAM(S): 17 POWDER, FOR SOLUTION ORAL at 11:37

## 2024-03-04 RX ADMIN — Medication 10 MILLIGRAM(S): at 11:36

## 2024-03-04 RX ADMIN — GABAPENTIN 800 MILLIGRAM(S): 400 CAPSULE ORAL at 11:37

## 2024-03-04 RX ADMIN — Medication 1: at 11:37

## 2024-03-04 NOTE — DISCHARGE NOTE PROVIDER - NSDCADMDATE_GEN_ALL_CORE_FT
03-Mar-2024 01:17 Birth Control Pills Counseling: Birth Control Pill Counseling: I discussed with the patient the potential side effects of OCPs including but not limited to increased risk of stroke, heart attack, thrombophlebitis, deep venous thrombosis, hepatic adenomas, breast changes, GI upset, headaches, and depression.  The patient verbalized understanding of the proper use and possible adverse effects of OCPs. All of the patient's questions and concerns were addressed.

## 2024-03-04 NOTE — DISCHARGE NOTE PROVIDER - NSDCMRMEDTOKEN_GEN_ALL_CORE_FT
amLODIPine 10 mg oral tablet: 1 tab(s) orally once a day  aspirin 81 mg oral delayed release tablet: 1 tab(s) orally once a day  atorvastatin 80 mg oral tablet: 1 tab(s) orally once a day (at bedtime)  cefpodoxime 200 mg oral tablet: 1 tab(s) orally 2 times a day  clopidogrel 75 mg oral tablet: 1 tab(s) orally once a day  gabapentin 300 mg oral capsule: 1 cap(s) orally once a day (at bedtime)  insulin aspart 100 units/mL subcutaneous solution: 26 unit(s) subcutaneous once a day (at bedtime)  insulin glargine 100 units/mL subcutaneous solution: 20 unit(s) subcutaneous once a day (at bedtime)   isosorbide mononitrate 30 mg oral tablet, extended release: 1 tab(s) orally once a day  metFORMIN 1000 mg oral tablet: 1 tab(s) orally 2 times a day (with meals)  metoprolol succinate 100 mg oral tablet, extended release: 1 tab(s) orally once a day  oxyBUTYnin 10 mg/24 hr oral tablet, extended release: 1 tab(s) orally once a day  pantoprazole 40 mg oral delayed release tablet: 1 tab(s) orally once a day (before a meal)  ranolazine 500 mg oral tablet, extended release: 1 tab(s) orally 2 times a day  valsartan 160 mg oral tablet: 1 tab(s) orally once a day

## 2024-03-04 NOTE — DISCHARGE NOTE NURSING/CASE MANAGEMENT/SOCIAL WORK - PATIENT PORTAL LINK FT
You can access the FollowMyHealth Patient Portal offered by [facility] by registering at the following website: [email or web address]. By joining [email or web address]’s FollowMyHealth portal, you will also be able to view your health information using other applications (apps) compatible with our system.

## 2024-03-04 NOTE — DISCHARGE NOTE PROVIDER - NSDCCPCAREPLAN_GEN_ALL_CORE_FT
PRINCIPAL DISCHARGE DIAGNOSIS  Diagnosis: Acute UTI  Assessment and Plan of Treatment:       SECONDARY DISCHARGE DIAGNOSES  Diagnosis: BRADY (acute kidney injury)  Assessment and Plan of Treatment:

## 2024-03-04 NOTE — DISCHARGE NOTE PROVIDER - HOSPITAL COURSE
72yo F with PMHx of CAD s/p stent, CVA, HB s/p PPM, IDDM, HTN, GERD presented to ED c/o chest pain and dysuria. patient admitted to medicine and seen by surgery who recommended conservative manasgement for stones on us. Patient     UTI   -UA positive, afebrile,  - dc with vantin  - pos for e coli    ARF with mild b/l hydronephrosis   -stable    Cholelithiases  -distended GB on US and CT  -Surgical consulted noted, no acute surgical intervention    CAD  -cont plavix, ASA, Atorvastatin     IDDM  -cont Lantus 26u QHS   -check A1c  7.3    HTN  -cont valsartan, amlodipine and metoprolol      stable for dc home 72yo F with PMHx of CAD s/p stent, CVA, HB s/p PPM, IDDM, HTN, GERD presented to ED c/o chest pain and dysuria. patient admitted to medicine and seen by surgery who recommended conservative manasgement for stones on us. Patient     UTI   -UA positive, afebrile,  - dc with vantin  - pos for e coli    ARF with mild b/l hydronephrosis   -stable    Cholelithiases  -distended GB on US and CT  -Surgical consulted noted, no acute surgical intervention    CAD. chest pain   -cont plavix, ASA, Atorvastatin   trops eegative x 3  ekg same as prior     IDDM  -cont Lantus 26u QHS   -check A1c  7.3    HTN  -cont valsartan, amlodipine and metoprolol      stable for dc home

## 2024-03-04 NOTE — DISCHARGE NOTE PROVIDER - ATTENDING DISCHARGE PHYSICAL EXAMINATION:
GENERAL: patient appears well, no acute distress, appropriate, pleasant  EYES: sclera clear, no exudates  ENMT: slight facila droop (chronic) as per family  NECK: supple, soft, no thyromegaly noted  LUNGS: good air entry bilaterally, clear to auscultation, symmetric breath sounds, no wheezing or rhonchi appreciated  HEART: soft S1/S2, regular rate and rhythm, no murmurs noted, no lower extremity edema  GASTROINTESTINAL: abdomen is soft, nontender, nondistended, normoactive bowel sounds, no palpable masses  INTEGUMENT: good skin turgor, warm skin, appears well perfused  MUSCULOSKELETAL: no clubbing or cyanosis, no obvious deformity  NEUROLOGIC: awake, alert, oriented x3, good muscle tone in 4 extremities, no obvious sensory deficits  PSYCHIATRIC: mood is good, affect is congruent, linear and logical thought process  HEME/LYMPH: no palpable supraclavicular nodules, no obvious ecchymosis or petechiae

## 2024-03-05 LAB
-  AMOXICILLIN/CLAVULANIC ACID: SIGNIFICANT CHANGE UP
-  AMPICILLIN/SULBACTAM: SIGNIFICANT CHANGE UP
-  AMPICILLIN: SIGNIFICANT CHANGE UP
-  AZTREONAM: SIGNIFICANT CHANGE UP
-  CEFAZOLIN: SIGNIFICANT CHANGE UP
-  CEFEPIME: SIGNIFICANT CHANGE UP
-  CEFTRIAXONE: SIGNIFICANT CHANGE UP
-  CEFUROXIME: SIGNIFICANT CHANGE UP
-  CIPROFLOXACIN: SIGNIFICANT CHANGE UP
-  ERTAPENEM: SIGNIFICANT CHANGE UP
-  GENTAMICIN: SIGNIFICANT CHANGE UP
-  IMIPENEM: SIGNIFICANT CHANGE UP
-  LEVOFLOXACIN: SIGNIFICANT CHANGE UP
-  MEROPENEM: SIGNIFICANT CHANGE UP
-  NITROFURANTOIN: SIGNIFICANT CHANGE UP
-  PIPERACILLIN/TAZOBACTAM: SIGNIFICANT CHANGE UP
-  TOBRAMYCIN: SIGNIFICANT CHANGE UP
-  TRIMETHOPRIM/SULFAMETHOXAZOLE: SIGNIFICANT CHANGE UP
CULTURE RESULTS: ABNORMAL
METHOD TYPE: SIGNIFICANT CHANGE UP
ORGANISM # SPEC MICROSCOPIC CNT: ABNORMAL
ORGANISM # SPEC MICROSCOPIC CNT: SIGNIFICANT CHANGE UP
SPECIMEN SOURCE: SIGNIFICANT CHANGE UP

## 2024-03-05 RX ORDER — FOSFOMYCIN TROMETHAMINE 3 G/1
1 POWDER ORAL
Qty: 3 | Refills: 0
Start: 2024-03-05 | End: 2024-03-13

## 2024-03-05 NOTE — CHART NOTE - NSCHARTNOTEFT_GEN_A_CORE
[facility]bl [facility]i in urine culture  I personally called the family at 1255pm and informed them to stop vantin and to start fosfomycin 3gm q72 hours for 3 doses and they understood all directions

## 2024-04-22 NOTE — H&P ADULT - HIV OFFER
Patient has persistent depression which is unknown and is currently uncontrolled.She is not prescribed anti-depressant medications outpatient. We will not consult psychiatry at this time. Patient does not display psychosis at this time. Continue to monitor closely and adjust plan of care as needed.       Opt out

## 2024-04-29 ENCOUNTER — NON-APPOINTMENT (OUTPATIENT)
Age: 74
End: 2024-04-29

## 2024-04-29 ENCOUNTER — APPOINTMENT (OUTPATIENT)
Dept: CARDIOLOGY | Facility: CLINIC | Age: 74
End: 2024-04-29

## 2024-04-30 ENCOUNTER — APPOINTMENT (OUTPATIENT)
Dept: CARDIOLOGY | Facility: CLINIC | Age: 74
End: 2024-04-30
Payer: MEDICARE

## 2024-04-30 ENCOUNTER — NON-APPOINTMENT (OUTPATIENT)
Age: 74
End: 2024-04-30

## 2024-04-30 VITALS
WEIGHT: 138 LBS | HEIGHT: 59 IN | SYSTOLIC BLOOD PRESSURE: 153 MMHG | HEART RATE: 74 BPM | DIASTOLIC BLOOD PRESSURE: 79 MMHG | OXYGEN SATURATION: 96 % | BODY MASS INDEX: 27.82 KG/M2

## 2024-04-30 DIAGNOSIS — R07.9 CHEST PAIN, UNSPECIFIED: ICD-10-CM

## 2024-04-30 DIAGNOSIS — I10 ESSENTIAL (PRIMARY) HYPERTENSION: ICD-10-CM

## 2024-04-30 DIAGNOSIS — R06.02 SHORTNESS OF BREATH: ICD-10-CM

## 2024-04-30 DIAGNOSIS — Z95.0 PRESENCE OF CARDIAC PACEMAKER: ICD-10-CM

## 2024-04-30 PROCEDURE — 93000 ELECTROCARDIOGRAM COMPLETE: CPT

## 2024-04-30 PROCEDURE — 99215 OFFICE O/P EST HI 40 MIN: CPT | Mod: 25

## 2024-04-30 RX ORDER — CHROMIUM 200 MCG
TABLET ORAL DAILY
Refills: 0 | Status: DISCONTINUED | COMMUNITY
End: 2024-04-30

## 2024-04-30 RX ORDER — INSULIN GLARGINE 100 [IU]/ML
100 INJECTION, SOLUTION SUBCUTANEOUS
Refills: 0 | Status: DISCONTINUED | COMMUNITY
Start: 2020-05-28 | End: 2024-04-30

## 2024-04-30 RX ORDER — PNV NO.95/FERROUS FUM/FOLIC AC 28MG-0.8MG
TABLET ORAL
Refills: 0 | Status: DISCONTINUED | COMMUNITY
End: 2024-04-30

## 2024-04-30 RX ORDER — PEN NEEDLE, DIABETIC 29 G X1/2"
31G X 5 MM NEEDLE, DISPOSABLE MISCELLANEOUS
Refills: 0 | Status: DISCONTINUED | COMMUNITY
Start: 2020-05-28 | End: 2024-04-30

## 2024-04-30 RX ORDER — POTASSIUM &MAGNESIUM ASPARTATE 250-250 MG
CAPSULE ORAL
Refills: 0 | Status: DISCONTINUED | COMMUNITY
End: 2024-04-30

## 2024-04-30 RX ORDER — PEN NEEDLE, DIABETIC 29 G X1/2"
31G X 8 MM NEEDLE, DISPOSABLE MISCELLANEOUS
Refills: 0 | Status: DISCONTINUED | COMMUNITY
Start: 2021-12-23 | End: 2024-04-30

## 2024-04-30 RX ORDER — MULTIVIT WITH CALCIUM,IRON,MIN
TABLET ORAL
Refills: 0 | Status: DISCONTINUED | COMMUNITY
End: 2024-04-30

## 2024-04-30 RX ORDER — MULTIVIT-MIN/IRON/FOLIC ACID/K 18-600-40
CAPSULE ORAL
Refills: 0 | Status: DISCONTINUED | COMMUNITY
End: 2024-04-30

## 2024-04-30 RX ORDER — FAMOTIDINE 20 MG/1
20 TABLET, FILM COATED ORAL
Refills: 0 | Status: DISCONTINUED | COMMUNITY
End: 2024-04-30

## 2024-04-30 RX ORDER — FERROUS SULFATE 325(65) MG
325 (65 FE) TABLET ORAL 3 TIMES DAILY
Refills: 0 | Status: ACTIVE | COMMUNITY
Start: 2024-04-30

## 2024-04-30 RX ORDER — CHROMIUM 200 MCG
800 TABLET ORAL
Refills: 0 | Status: ACTIVE | COMMUNITY
Start: 2024-04-30

## 2024-04-30 RX ORDER — INSULIN GLARGINE-YFGN 100 [IU]/ML
100 INJECTION, SOLUTION SUBCUTANEOUS
Refills: 0 | Status: DISCONTINUED | COMMUNITY
Start: 2021-12-27 | End: 2024-04-30

## 2024-05-03 ENCOUNTER — APPOINTMENT (OUTPATIENT)
Dept: CARDIOLOGY | Facility: CLINIC | Age: 74
End: 2024-05-03
Payer: MEDICARE

## 2024-05-03 VITALS — HEART RATE: 66 BPM | SYSTOLIC BLOOD PRESSURE: 122 MMHG | OXYGEN SATURATION: 99 % | DIASTOLIC BLOOD PRESSURE: 66 MMHG

## 2024-05-03 VITALS — BODY MASS INDEX: 29.84 KG/M2 | HEIGHT: 59 IN | WEIGHT: 148 LBS

## 2024-05-03 DIAGNOSIS — I50.9 HEART FAILURE, UNSPECIFIED: ICD-10-CM

## 2024-05-03 DIAGNOSIS — Z01.810 ENCOUNTER FOR PREPROCEDURAL CARDIOVASCULAR EXAMINATION: ICD-10-CM

## 2024-05-03 PROCEDURE — 99214 OFFICE O/P EST MOD 30 MIN: CPT | Mod: 25

## 2024-05-03 PROCEDURE — 93000 ELECTROCARDIOGRAM COMPLETE: CPT

## 2024-05-03 RX ORDER — SPIRONOLACTONE 25 MG/1
25 TABLET, FILM COATED ORAL DAILY
Refills: 0 | Status: DISCONTINUED | COMMUNITY
End: 2024-05-03

## 2024-05-03 RX ORDER — AMLODIPINE BESYLATE 10 MG/1
10 TABLET ORAL DAILY
Refills: 0 | Status: DISCONTINUED | COMMUNITY
End: 2024-05-03

## 2024-05-03 RX ORDER — ASPIRIN 81 MG
81 TABLET, DELAYED RELEASE (ENTERIC COATED) ORAL DAILY
Refills: 0 | Status: DISCONTINUED | COMMUNITY
End: 2024-05-03

## 2024-05-03 RX ORDER — RANOLAZINE 500 MG/1
500 TABLET, EXTENDED RELEASE ORAL
Refills: 0 | Status: DISCONTINUED | COMMUNITY
End: 2024-05-03

## 2024-05-06 RX ORDER — RANOLAZINE 500 MG/1
500 TABLET, EXTENDED RELEASE ORAL
Qty: 180 | Refills: 3 | Status: ACTIVE | COMMUNITY
Start: 2021-12-27 | End: 1900-01-01

## 2024-05-06 RX ORDER — AMLODIPINE BESYLATE 10 MG/1
10 TABLET ORAL
Qty: 90 | Refills: 3 | Status: ACTIVE | COMMUNITY
Start: 2024-05-03 | End: 1900-01-01

## 2024-05-06 RX ORDER — VALSARTAN 160 MG/1
160 TABLET, COATED ORAL
Qty: 90 | Refills: 3 | Status: ACTIVE | COMMUNITY
Start: 2022-11-18 | End: 1900-01-01

## 2024-05-06 RX ORDER — CLOPIDOGREL BISULFATE 75 MG/1
75 TABLET, FILM COATED ORAL
Qty: 90 | Refills: 3 | Status: ACTIVE | COMMUNITY
Start: 1900-01-01 | End: 1900-01-01

## 2024-05-06 RX ORDER — KRILL/OM-3/DHA/EPA/PHOSPHO/AST 1000-230MG
81 CAPSULE ORAL DAILY
Qty: 90 | Refills: 3 | Status: ACTIVE | COMMUNITY
Start: 2024-05-03 | End: 1900-01-01

## 2024-05-06 RX ORDER — ATORVASTATIN CALCIUM 80 MG/1
80 TABLET, FILM COATED ORAL
Qty: 90 | Refills: 3 | Status: ACTIVE | COMMUNITY
Start: 2021-12-27 | End: 1900-01-01

## 2024-05-06 RX ORDER — METOPROLOL SUCCINATE 100 MG/1
100 TABLET, EXTENDED RELEASE ORAL DAILY
Qty: 90 | Refills: 3 | Status: ACTIVE | COMMUNITY
Start: 2021-12-27 | End: 1900-01-01

## 2024-05-06 RX ORDER — PANTOPRAZOLE 20 MG/1
20 TABLET, DELAYED RELEASE ORAL DAILY
Qty: 90 | Refills: 3 | Status: ACTIVE | COMMUNITY
Start: 2024-05-03 | End: 1900-01-01

## 2024-05-06 RX ORDER — ISOSORBIDE MONONITRATE 30 MG/1
30 TABLET, EXTENDED RELEASE ORAL
Qty: 90 | Refills: 3 | Status: ACTIVE | COMMUNITY
Start: 2021-12-27 | End: 1900-01-01

## 2024-05-06 RX ORDER — FUROSEMIDE 40 MG/1
40 TABLET ORAL DAILY
Qty: 90 | Refills: 3 | Status: ACTIVE | COMMUNITY
Start: 1900-01-01 | End: 1900-01-01

## 2024-05-07 LAB
ANION GAP SERPL CALC-SCNC: 13 MMOL/L
BUN SERPL-MCNC: 23 MG/DL
CALCIUM SERPL-MCNC: 10.2 MG/DL
CHLORIDE SERPL-SCNC: 93 MMOL/L
CO2 SERPL-SCNC: 22 MMOL/L
CREAT SERPL-MCNC: 1.46 MG/DL
EGFR: 38 ML/MIN/1.73M2
GLUCOSE SERPL-MCNC: 73 MG/DL
NT-PROBNP SERPL-MCNC: 5154 PG/ML
POTASSIUM SERPL-SCNC: 5.9 MMOL/L
SODIUM SERPL-SCNC: 128 MMOL/L

## 2024-05-07 RX ORDER — SPIRONOLACTONE 25 MG/1
25 TABLET ORAL
Qty: 90 | Refills: 3 | Status: DISCONTINUED | COMMUNITY
Start: 2024-05-03 | End: 2024-05-07

## 2024-05-08 PROBLEM — I50.9 OTHER CONGESTIVE HEART FAILURE: Status: ACTIVE | Noted: 2024-04-30

## 2024-05-08 PROBLEM — Z01.810 PREOPERATIVE CARDIOVASCULAR EXAMINATION: Status: ACTIVE | Noted: 2023-12-04

## 2024-05-09 ENCOUNTER — INPATIENT (INPATIENT)
Facility: HOSPITAL | Age: 74
LOS: 4 days | Discharge: HOME | DRG: 603 | End: 2024-05-14
Attending: STUDENT IN AN ORGANIZED HEALTH CARE EDUCATION/TRAINING PROGRAM | Admitting: INTERNAL MEDICINE
Payer: MEDICARE

## 2024-05-09 VITALS
SYSTOLIC BLOOD PRESSURE: 128 MMHG | HEART RATE: 74 BPM | OXYGEN SATURATION: 98 % | RESPIRATION RATE: 16 BRPM | WEIGHT: 150.36 LBS | DIASTOLIC BLOOD PRESSURE: 80 MMHG | HEIGHT: 63 IN | TEMPERATURE: 98 F

## 2024-05-09 DIAGNOSIS — L03.90 CELLULITIS, UNSPECIFIED: ICD-10-CM

## 2024-05-09 DIAGNOSIS — Z86.011 PERSONAL HISTORY OF BENIGN NEOPLASM OF THE BRAIN: Chronic | ICD-10-CM

## 2024-05-09 LAB
ALBUMIN SERPL ELPH-MCNC: 4.5 G/DL — SIGNIFICANT CHANGE UP (ref 3.3–5.2)
ALP SERPL-CCNC: 114 U/L — SIGNIFICANT CHANGE UP (ref 40–120)
ALT FLD-CCNC: 17 U/L — SIGNIFICANT CHANGE UP
ANION GAP SERPL CALC-SCNC: 13 MMOL/L — SIGNIFICANT CHANGE UP (ref 5–17)
APTT BLD: 32.8 SEC — SIGNIFICANT CHANGE UP (ref 24.5–35.6)
AST SERPL-CCNC: 19 U/L — SIGNIFICANT CHANGE UP
BASOPHILS # BLD AUTO: 0.05 K/UL — SIGNIFICANT CHANGE UP (ref 0–0.2)
BASOPHILS NFR BLD AUTO: 0.6 % — SIGNIFICANT CHANGE UP (ref 0–2)
BILIRUB SERPL-MCNC: 0.4 MG/DL — SIGNIFICANT CHANGE UP (ref 0.4–2)
BUN SERPL-MCNC: 18 MG/DL — SIGNIFICANT CHANGE UP (ref 8–20)
CALCIUM SERPL-MCNC: 9.4 MG/DL — SIGNIFICANT CHANGE UP (ref 8.4–10.5)
CHLORIDE SERPL-SCNC: 93 MMOL/L — LOW (ref 96–108)
CO2 SERPL-SCNC: 24 MMOL/L — SIGNIFICANT CHANGE UP (ref 22–29)
CREAT SERPL-MCNC: 1.19 MG/DL — SIGNIFICANT CHANGE UP (ref 0.5–1.3)
EGFR: 49 ML/MIN/1.73M2 — LOW
EOSINOPHIL # BLD AUTO: 0.2 K/UL — SIGNIFICANT CHANGE UP (ref 0–0.5)
EOSINOPHIL NFR BLD AUTO: 2.6 % — SIGNIFICANT CHANGE UP (ref 0–6)
GLUCOSE BLDC GLUCOMTR-MCNC: 181 MG/DL — HIGH (ref 70–99)
GLUCOSE SERPL-MCNC: 268 MG/DL — HIGH (ref 70–99)
HCT VFR BLD CALC: 36.1 % — SIGNIFICANT CHANGE UP (ref 34.5–45)
HGB BLD-MCNC: 12 G/DL — SIGNIFICANT CHANGE UP (ref 11.5–15.5)
IMM GRANULOCYTES NFR BLD AUTO: 0.5 % — SIGNIFICANT CHANGE UP (ref 0–0.9)
INR BLD: 1.04 RATIO — SIGNIFICANT CHANGE UP (ref 0.85–1.18)
LYMPHOCYTES # BLD AUTO: 2.18 K/UL — SIGNIFICANT CHANGE UP (ref 1–3.3)
LYMPHOCYTES # BLD AUTO: 28 % — SIGNIFICANT CHANGE UP (ref 13–44)
MCHC RBC-ENTMCNC: 28 PG — SIGNIFICANT CHANGE UP (ref 27–34)
MCHC RBC-ENTMCNC: 33.2 GM/DL — SIGNIFICANT CHANGE UP (ref 32–36)
MCV RBC AUTO: 84.3 FL — SIGNIFICANT CHANGE UP (ref 80–100)
MONOCYTES # BLD AUTO: 0.75 K/UL — SIGNIFICANT CHANGE UP (ref 0–0.9)
MONOCYTES NFR BLD AUTO: 9.6 % — SIGNIFICANT CHANGE UP (ref 2–14)
NEUTROPHILS # BLD AUTO: 4.57 K/UL — SIGNIFICANT CHANGE UP (ref 1.8–7.4)
NEUTROPHILS NFR BLD AUTO: 58.7 % — SIGNIFICANT CHANGE UP (ref 43–77)
PLATELET # BLD AUTO: 394 K/UL — SIGNIFICANT CHANGE UP (ref 150–400)
POTASSIUM SERPL-MCNC: 5 MMOL/L — SIGNIFICANT CHANGE UP (ref 3.5–5.3)
POTASSIUM SERPL-SCNC: 5 MMOL/L — SIGNIFICANT CHANGE UP (ref 3.5–5.3)
PROT SERPL-MCNC: 8 G/DL — SIGNIFICANT CHANGE UP (ref 6.6–8.7)
PROTHROM AB SERPL-ACNC: 11.5 SEC — SIGNIFICANT CHANGE UP (ref 9.5–13)
RBC # BLD: 4.28 M/UL — SIGNIFICANT CHANGE UP (ref 3.8–5.2)
RBC # FLD: 13.6 % — SIGNIFICANT CHANGE UP (ref 10.3–14.5)
SODIUM SERPL-SCNC: 129 MMOL/L — LOW (ref 135–145)
WBC # BLD: 7.79 K/UL — SIGNIFICANT CHANGE UP (ref 3.8–10.5)
WBC # FLD AUTO: 7.79 K/UL — SIGNIFICANT CHANGE UP (ref 3.8–10.5)

## 2024-05-09 PROCEDURE — 99285 EMERGENCY DEPT VISIT HI MDM: CPT

## 2024-05-09 PROCEDURE — 99223 1ST HOSP IP/OBS HIGH 75: CPT

## 2024-05-09 PROCEDURE — 93010 ELECTROCARDIOGRAM REPORT: CPT

## 2024-05-09 PROCEDURE — 99232 SBSQ HOSP IP/OBS MODERATE 35: CPT | Mod: GC

## 2024-05-09 PROCEDURE — 73590 X-RAY EXAM OF LOWER LEG: CPT | Mod: 26,RT

## 2024-05-09 PROCEDURE — 93923 UPR/LXTR ART STDY 3+ LVLS: CPT | Mod: 26

## 2024-05-09 RX ORDER — PIPERACILLIN AND TAZOBACTAM 4; .5 G/20ML; G/20ML
3.38 INJECTION, POWDER, LYOPHILIZED, FOR SOLUTION INTRAVENOUS ONCE
Refills: 0 | Status: COMPLETED | OUTPATIENT
Start: 2024-05-09 | End: 2024-05-09

## 2024-05-09 RX ORDER — METOPROLOL TARTRATE 50 MG
100 TABLET ORAL DAILY
Refills: 0 | Status: DISCONTINUED | OUTPATIENT
Start: 2024-05-09 | End: 2024-05-14

## 2024-05-09 RX ORDER — SODIUM CHLORIDE 9 MG/ML
1000 INJECTION, SOLUTION INTRAVENOUS
Refills: 0 | Status: DISCONTINUED | OUTPATIENT
Start: 2024-05-09 | End: 2024-05-14

## 2024-05-09 RX ORDER — LANOLIN ALCOHOL/MO/W.PET/CERES
3 CREAM (GRAM) TOPICAL AT BEDTIME
Refills: 0 | Status: DISCONTINUED | OUTPATIENT
Start: 2024-05-09 | End: 2024-05-14

## 2024-05-09 RX ORDER — INSULIN ASPART 100 [IU]/ML
26 INJECTION, SOLUTION SUBCUTANEOUS
Refills: 0 | DISCHARGE

## 2024-05-09 RX ORDER — ISOSORBIDE MONONITRATE 60 MG/1
30 TABLET, EXTENDED RELEASE ORAL DAILY
Refills: 0 | Status: DISCONTINUED | OUTPATIENT
Start: 2024-05-09 | End: 2024-05-14

## 2024-05-09 RX ORDER — CEFAZOLIN SODIUM 1 G
1000 VIAL (EA) INJECTION ONCE
Refills: 0 | Status: COMPLETED | OUTPATIENT
Start: 2024-05-09 | End: 2024-05-09

## 2024-05-09 RX ORDER — DEXTROSE 50 % IN WATER 50 %
12.5 SYRINGE (ML) INTRAVENOUS ONCE
Refills: 0 | Status: DISCONTINUED | OUTPATIENT
Start: 2024-05-09 | End: 2024-05-14

## 2024-05-09 RX ORDER — ACETAMINOPHEN 500 MG
650 TABLET ORAL EVERY 6 HOURS
Refills: 0 | Status: DISCONTINUED | OUTPATIENT
Start: 2024-05-09 | End: 2024-05-14

## 2024-05-09 RX ORDER — CLOPIDOGREL BISULFATE 75 MG/1
75 TABLET, FILM COATED ORAL DAILY
Refills: 0 | Status: DISCONTINUED | OUTPATIENT
Start: 2024-05-09 | End: 2024-05-14

## 2024-05-09 RX ORDER — HEPARIN SODIUM 5000 [USP'U]/ML
5000 INJECTION INTRAVENOUS; SUBCUTANEOUS EVERY 12 HOURS
Refills: 0 | Status: DISCONTINUED | OUTPATIENT
Start: 2024-05-09 | End: 2024-05-14

## 2024-05-09 RX ORDER — ENOXAPARIN SODIUM 100 MG/ML
40 INJECTION SUBCUTANEOUS EVERY 24 HOURS
Refills: 0 | Status: DISCONTINUED | OUTPATIENT
Start: 2024-05-09 | End: 2024-05-09

## 2024-05-09 RX ORDER — INSULIN GLARGINE 100 [IU]/ML
20 INJECTION, SOLUTION SUBCUTANEOUS AT BEDTIME
Refills: 0 | Status: DISCONTINUED | OUTPATIENT
Start: 2024-05-09 | End: 2024-05-14

## 2024-05-09 RX ORDER — AMLODIPINE BESYLATE 2.5 MG/1
10 TABLET ORAL DAILY
Refills: 0 | Status: DISCONTINUED | OUTPATIENT
Start: 2024-05-09 | End: 2024-05-14

## 2024-05-09 RX ORDER — ONDANSETRON 8 MG/1
4 TABLET, FILM COATED ORAL EVERY 8 HOURS
Refills: 0 | Status: DISCONTINUED | OUTPATIENT
Start: 2024-05-09 | End: 2024-05-14

## 2024-05-09 RX ORDER — DEXTROSE 50 % IN WATER 50 %
25 SYRINGE (ML) INTRAVENOUS ONCE
Refills: 0 | Status: DISCONTINUED | OUTPATIENT
Start: 2024-05-09 | End: 2024-05-14

## 2024-05-09 RX ORDER — OXYBUTYNIN CHLORIDE 5 MG
1 TABLET ORAL
Refills: 0 | DISCHARGE

## 2024-05-09 RX ORDER — CEFAZOLIN SODIUM 1 G
1000 VIAL (EA) INJECTION ONCE
Refills: 0 | Status: DISCONTINUED | OUTPATIENT
Start: 2024-05-09 | End: 2024-05-09

## 2024-05-09 RX ORDER — VALSARTAN 80 MG/1
160 TABLET ORAL DAILY
Refills: 0 | Status: DISCONTINUED | OUTPATIENT
Start: 2024-05-09 | End: 2024-05-14

## 2024-05-09 RX ORDER — PIPERACILLIN AND TAZOBACTAM 4; .5 G/20ML; G/20ML
3.38 INJECTION, POWDER, LYOPHILIZED, FOR SOLUTION INTRAVENOUS ONCE
Refills: 0 | Status: COMPLETED | OUTPATIENT
Start: 2024-05-10 | End: 2024-05-10

## 2024-05-09 RX ORDER — DEXTROSE 50 % IN WATER 50 %
15 SYRINGE (ML) INTRAVENOUS ONCE
Refills: 0 | Status: DISCONTINUED | OUTPATIENT
Start: 2024-05-09 | End: 2024-05-14

## 2024-05-09 RX ORDER — ASPIRIN/CALCIUM CARB/MAGNESIUM 324 MG
81 TABLET ORAL DAILY
Refills: 0 | Status: DISCONTINUED | OUTPATIENT
Start: 2024-05-09 | End: 2024-05-14

## 2024-05-09 RX ORDER — INSULIN GLARGINE 100 [IU]/ML
20 INJECTION, SOLUTION SUBCUTANEOUS ONCE
Refills: 0 | Status: COMPLETED | OUTPATIENT
Start: 2024-05-09 | End: 2024-05-09

## 2024-05-09 RX ORDER — GABAPENTIN 400 MG/1
300 CAPSULE ORAL AT BEDTIME
Refills: 0 | Status: DISCONTINUED | OUTPATIENT
Start: 2024-05-09 | End: 2024-05-14

## 2024-05-09 RX ORDER — ATORVASTATIN CALCIUM 80 MG/1
80 TABLET, FILM COATED ORAL AT BEDTIME
Refills: 0 | Status: DISCONTINUED | OUTPATIENT
Start: 2024-05-09 | End: 2024-05-14

## 2024-05-09 RX ORDER — PIPERACILLIN AND TAZOBACTAM 4; .5 G/20ML; G/20ML
3.38 INJECTION, POWDER, LYOPHILIZED, FOR SOLUTION INTRAVENOUS EVERY 8 HOURS
Refills: 0 | Status: DISCONTINUED | OUTPATIENT
Start: 2024-05-10 | End: 2024-05-14

## 2024-05-09 RX ORDER — GLUCAGON INJECTION, SOLUTION 0.5 MG/.1ML
1 INJECTION, SOLUTION SUBCUTANEOUS ONCE
Refills: 0 | Status: DISCONTINUED | OUTPATIENT
Start: 2024-05-09 | End: 2024-05-14

## 2024-05-09 RX ORDER — INSULIN LISPRO 100/ML
VIAL (ML) SUBCUTANEOUS
Refills: 0 | Status: DISCONTINUED | OUTPATIENT
Start: 2024-05-09 | End: 2024-05-14

## 2024-05-09 RX ORDER — DEXTROSE 10 % IN WATER 10 %
125 INTRAVENOUS SOLUTION INTRAVENOUS ONCE
Refills: 0 | Status: DISCONTINUED | OUTPATIENT
Start: 2024-05-09 | End: 2024-05-14

## 2024-05-09 RX ORDER — VANCOMYCIN HCL 1 G
VIAL (EA) INTRAVENOUS
Refills: 0 | Status: DISCONTINUED | OUTPATIENT
Start: 2024-05-10 | End: 2024-05-10

## 2024-05-09 RX ADMIN — Medication 100 MILLIGRAM(S): at 23:48

## 2024-05-09 RX ADMIN — Medication 2: at 23:48

## 2024-05-09 RX ADMIN — INSULIN GLARGINE 20 UNIT(S): 100 INJECTION, SOLUTION SUBCUTANEOUS at 23:47

## 2024-05-09 RX ADMIN — Medication 1000 MILLIGRAM(S): at 18:20

## 2024-05-09 NOTE — H&P ADULT - ASSESSMENT
Assessment:   73 F PMHx CAD s/p stent, CVA, PAD, HB s/p PPM, IDDM, HTN, GERD sent in by PCP for evaluation of RLE cellulitis. Admitted for RLE cellulitis.     Plan:   RLE cellulitis   - Patient asymptomatic  - Afebrile, normotensive, non-tachycardic, does not meet SIRS criteria   - Likely due to DM/PAD   - No leukocytosis   - Blood cultures ordered   - Given Cefazolin 1000 mg IV x1 in ED   - Placed on Clindamycin 600 mg IV q8   - VA physiol extremity Lower 3+ level, BI ordered   - Xray tibia + Fibula r/o OM ordered   - Follow up cultures/imaging   - Trend Fever  - Trend WBC  - Vascular following   - ID consult in AM     CAD  - Hx stent   - continue home med  Aspirin 81 mg qd, Clopidogrel 75 mg qd.     IDDM   - Home meds metformin 1000 mg BID, insulin Lantus 26 U at bedtime held.   - BG elevated on admission   - HgbA1c 7.2 3/4   - HgbA1c in AM   - Lantus 20 U bedtime   - Sliding scale moderate   - Maintain -180    HTN  - ordered home meds metoprolol succinate 100mg qd, amlodipine 10 mg qd, valsartan 160 mg qd, isosorbide mononitrate 30 mg qd     PAD  - order home meds atorvastatin 80 mg qd     Diet: NPO for procedure in AM  DVT: SCDx, heparin subq  Dispo: Any bed    Assessment:   73 F PMHx CAD s/p stent, CVA, PAD, HB s/p PPM, IDDM, HTN, GERD sent in by PCP for evaluation of RLE cellulitis. Admitted for RLE cellulitis.     Plan:   RLE cellulitis   - Patient asymptomatic  - Afebrile, normotensive, non-tachycardic, does not meet SIRS criteria   - Likely due to DM/PAD   - No leukocytosis   - Blood cultures ordered   - Given Cefazolin 1000 mg IV x1 in ED   - Placed on Zosyn 3.375g IV q8   - Vancomycin 1000 mg IV x1, continue if MRSA swab +  - MRSA swab ordered   - VA physiol extremity Lower 3+ level, BI ordered   - Xray tibia + Fibula r/o OM ordered   - Follow up cultures/imaging   - Trend Fever  - Trend WBC  - Vascular following   - ID consult placed in AM     CAD  - Hx stent   - continue home med  Aspirin 81 mg qd, Clopidogrel 75 mg qd.     IDDM   - Home meds metformin 1000 mg BID held  - DC w/ insulin Lantus 26 U on previous admission 3/4 , patient says she does not take it.   - BG elevated on admission   - HgbA1c 7.2 3/4   - HgbA1c in AM   - Lantus 20 U bedtime   - Sliding scale moderate   - Maintain -180    HTN  - ordered home meds metoprolol succinate 100mg qd, amlodipine 10 mg qd, valsartan 160 mg qd, isosorbide mononitrate 30 mg qd     PAD  - order home meds atorvastatin 80 mg qd     Diet: NPO for procedure in AM  DVT: SCDx, heparin subq  Dispo: Any bed    Assessment:   73 F PMHx CAD s/p stent, CVA, PAD, HB s/p PPM, IDDM, HTN, GERD sent in by PCP for evaluation of RLE cellulitis. Admitted for RLE cellulitis.     Plan:   RLE cellulitis with ulcer  - Patient asymptomatic  - Afebrile, normotensive, non-tachycardic, does not meet SIRS criteria   - Likely due to DM/PAD   - No leukocytosis   - Blood cultures ordered   - Given Cefazolin 1000 mg IV x1 in ED   - Placed on Zosyn 3.375g IV q8   - Vancomycin 1000 mg IV x1, continue if MRSA swab +  - MRSA swab ordered   - VA physiol extremity Lower 3+ level, BI ordered   - Xray tibia + Fibula r/o OM ordered   - Follow up cultures/imaging   - Trend Fever  - Trend WBC  - Vascular following   - ID consult placed in AM     Pseudohyponatremia  -Corrected Na WNL. Trend BMP.     CAD  - Hx stent   - continue home med  Aspirin 81 mg qd, Clopidogrel 75 mg qd.     IDDM   - Home meds metformin 1000 mg BID held  - DC w/ insulin Lantus 26 U on previous admission 3/4 , patient says she does not take it.   - BG elevated on admission   - HgbA1c 7.2 3/4   - HgbA1c in AM   - Lantus 20 U bedtime   - Sliding scale moderate   - Maintain -180    HTN  - ordered home meds metoprolol succinate 100mg qd, amlodipine 10 mg qd, valsartan 160 mg qd, isosorbide mononitrate 30 mg qd     PAD  - order home meds atorvastatin 80 mg qd     Diet: NPO for procedure in AM  DVT: SCDx, heparin subq  Dispo: Any bed

## 2024-05-09 NOTE — CONSULT NOTE ADULT - ASSESSMENT
ASSESSMENT: 74yo F w/ hx of PAD with nonhealing ulcer of RLE. No evidence of acute limb ischemia at this time.     PLAN:  - will obtain non-invase studies: ANGEL/PVR  - consider angio on this admission  - pt will require Abx for wound  - rest of plan pending vascular workup    Pt seen and plan discussed with attending, Dr. Santos

## 2024-05-09 NOTE — ED ADULT NURSE NOTE - NSFALLUNIVINTERV_ED_ALL_ED
Bed/Stretcher in lowest position, wheels locked, appropriate side rails in place/Call bell, personal items and telephone in reach/Instruct patient to call for assistance before getting out of bed/chair/stretcher/Non-slip footwear applied when patient is off stretcher/[place] to call system/Physically safe environment - no spills, clutter or unnecessary equipment/Purposeful proactive rounding/Room/bathroom lighting operational, light cord in reach

## 2024-05-09 NOTE — CONSULT NOTE ADULT - SUBJECTIVE AND OBJECTIVE BOX
SUBJECTIVE: 72yo F w/ hx of HTN, HLD, DM, prior CVAwith Right sided deficits, prior ABI/BSO, RBBB, CAD presenting with nonhealing foot ulcer. Pt with wound on anterior aspect of RLE with overlying eschar and erythema. Motor and sensory intact. Pt with known hx of PAD and CT with IV contrast performed in March 2024 that did not show any significant perfusion deficits. No hx of vascular surgery interventions. Ambulatory at baseline. Vascular consulted for management of wound.     Vitals:  T(C): 36.7 (05-09-24 @ 15:41), Max: 36.7 (05-09-24 @ 15:41)  T(F): 98 (05-09-24 @ 15:41), Max: 98 (05-09-24 @ 15:41)  HR: 78 (05-09-24 @ 15:41) (74 - 78)  BP: 139/72 (05-09-24 @ 15:41) (128/80 - 139/72)  ABP: --  ABP(mean): --  RR: 18 (05-09-24 @ 15:41) (16 - 18)  SpO2: 100% (05-09-24 @ 15:41) (98% - 100%)      Labs:  GENERAL: NAD, lying in bed comfortably  HEAD:  Atraumatic, normocephalic  NECK: Supple, no JVD  HEART: RRR  LUNGS: Unlabored respirations. No conversational dyspnea.   VASCULAR: bilateral lower extremities warm and well perfused. motor and sensory intact  - RLE: PT signal, 4cm ulcer to anterior shin with escar  - LLE: palpable DP, PT signal  EXTREMITIES: No clubbing, cyanosis, or edema  NERVOUS SYSTEM:  A&Ox3, no focal deficits
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
## 2024-05-09 NOTE — H&P ADULT - HISTORY OF PRESENT ILLNESS
73 F PMHx CAD s/p stent, CVA, HB s/p PPM, IDDM, HTN, GERD sent in by PCP for evaluation of RLE cellulitis. 4 months ago patient developed ulcer RLE, came to [facility] for evaluation, received abx and DC, patient was managed by PCP. Recent admision to [facility], 5/3-5/4 for UTI, dc w/ Vantin, endorses completing tx. Patient endorses medication compliance, says she does not take insulin, however per chart review patient was on 26U Lantus w/ sliding scale. Denies fever, chills, headache, chest pain/tightness, SOB, cough, abdominal pain, N/V/D/C. While in ED patient was given Cefazolin, Vascular consulted.  73 F PMHx CAD s/p stent, CVA w/ right sided deficits, PAD, HB s/p PPM, IDDM, HTN, GERD sent in by PCP for evaluation of RLE cellulitis. 4 months ago patient developed ulcer RLE, came to [facility] for evaluation, received abx and DC, patient was managed by PCP. Recent admission to [facility], 3/3-3/4 for UTI, dc w/ Vantin, endorses completing tx. Patient endorses medication compliance, says she does not take insulin, however per chart review patient was on 26U Lantus w/ sliding scale. Denies fever, chills, headache, chest pain/tightness, SOB, cough, abdominal pain, N/V/D/C. While in ED patient was given Cefazolin, Vascular consulted.  Pt seen/examined prior to midnight on 5/9/24.    73 F PMHx CAD s/p stent, CVA w/ right sided deficits, PAD, HB s/p PPM, IDDM, HTN, GERD sent in by PCP for evaluation of RLE cellulitis. 4 months ago patient developed ulcer RLE, came to [facility] for evaluation, received abx and DC, patient was managed by PCP. Recent admission to [facility], 3/3-3/4 for UTI, dc w/ Vantin, endorses completing tx. Patient endorses medication compliance, says she does not take insulin, however per chart review patient was on 26U Lantus w/ sliding scale. Denies fever, chills, headache, chest pain/tightness, SOB, cough, abdominal pain, N/V/D/C. While in ED patient was given Cefazolin, Vascular consulted.

## 2024-05-09 NOTE — ED PROVIDER NOTE - INTERNATIONAL TRAVEL COUNTRIES
[facility] Pregnancy And Lactation Text: This medication is Pregnancy Category C. It is unknown if this medication is excreted in breast milk.

## 2024-05-09 NOTE — PATIENT PROFILE ADULT - FALL HARM RISK - HARM RISK INTERVENTIONS

## 2024-05-09 NOTE — PATIENT PROFILE ADULT - HAVE YOU BEEN EATING POORLY BECAUSE OF A DECREASED APPETITE?
Physical Therapy  Facility/Department: [facility]  Daily Treatment Note  NAME: Samuel Leal  : 1963  MRN: [id number]    Date of Service: 2020    Referring Provider:  DOUGLAS Whalen - CNS    Evaluating PT:  Kerri Del Toro PT, DPT. [id number]     Room #:  9682/6289-N  Diagnosis:  Stroke aborted by administration of thrombolytic agent   Reason for admission:  L weakness, tPA+  Precautions:  Falls, L hemiparesis, SBP <180, impulsive  Pertinent PMHx: OA, chronic back pain  Procedures: none  Equipment Recommendations:  hemiwalker      SUBJECTIVE:  Pt lives alone in a 3rs floor apartment with elevator access. Pt ambulated with no AD PTA. Pt independent for ADL performance.     OBJECTIVE:    Initial Evaluation  Date:  Treatment  20  Short Term/ Long Term   Goals   AM-PAC 6 Clicks   16     Was pt agreeable to Eval/treatment? Yes   yes     Does pt have pain? 8/10 chronic back pain  back pain with sitting, does not rate     Bed Mobility  Rolling: NT  Supine to sit: ModA  Sit to supine: NT  Scooting: ModA  Rolling: Min A  Supine to sit: Mod A  Sit to supine: NT  Scooting: Min A to EOB SBA   Transfers Sit to stand: ModA  Stand to sit: ModA  Stand pivot: MaxA with no AD HHA  Sit to stand: ModA  Stand to sit: ModA  Stand pivot: MaxA with with R hemicane SBA   Ambulation    20 feet with hemiwalker MaxA     20 feet x2 reps with R hemicane with Max A >100 feet with hemiwalker Enma   Stair negotiation: ascended and descended  NT  NT NT   ROM BUE:  See OT eval   BLE:  WFL       Strength BUE:  See OT eval   RLE: 5/5  LLE:  knee ext 3/5  Ankle DF 3/5   Increase by 1/3 MMT grade    Balance Sitting EOB:  SBA  Dynamic Standing:  MaxA  Sitting EOB: SBA  Dynamic standing:  Max A with R hemicane Sitting EOB:  indep  Dynamic Standing:  Enma      Pt is A & O x 4  Sensation:  Numbness to fingers of hand from fingertips to MCP  Edema:  unremarkable    Patient education  Pt educated on role of therapy, No (0)

## 2024-05-09 NOTE — H&P ADULT - TIME BILLING
Chart/Labs/Orders reviewed. Vascular Sx consulted. ID consulted. Appreciate reccs. Pt agreeable to plan. Med rec confirmed. Orders placed.

## 2024-05-09 NOTE — PATIENT PROFILE ADULT - NSPRESCRUSEDDRG_GEN_A_NUR
Actions Requested: DR Savage please review, patient is reluctant to go to Er  Situation/Background   Problem: neck pain   Onset: 3 days, the problem is getting worse   Associated Symptoms: anterior, lateral and posterior neck pain, 7/10, pain with chewin No

## 2024-05-09 NOTE — ED PROVIDER NOTE - CLINICAL SUMMARY MEDICAL DECISION MAKING FREE TEXT BOX
73-year-old female comes in with a history of a right lower extremity ulcer with an eschar x 2 months has been on outpatient antibiotics with no resolution.  Patient complains of pain and surrounding redness.  No fever.  Patient is also being evaluated by cardiology for other complaints.  She was referred by her primary to the emergency room for evaluation of this eschar and debridement.    Vital signs are stable.  Patient does not appear to be in acute distress neuro is nonfocal lungs are clear.  Cardiac exam is within normal limits no murmurs rubs or gallops.  Abdomen is soft nontender.  Right lower extremity anterior aspect proximal to the ankle 2 cm x 1 cm eschar with mild surrounding erythema tenderness lower extremities have loss of hair and shiny skin the right foot is cool pulses are not palpable and not picked up on Doppler.  Plan: Vascular consult labs consider admission.

## 2024-05-09 NOTE — H&P ADULT - NSHPSOURCEINFORD_GEN_ALL_CORE
Chart(s)/Patient/Spouse/Significant Other Chart(s)/Patient/Spouse/Significant Other/Physician/Provider

## 2024-05-09 NOTE — H&P ADULT - ATTENDING COMMENTS
Changes made to above otherwise agree with documentation in brief:    Assessment:   73 F PMHx CAD s/p stent, CVA, PAD, HB s/p PPM, IDDM, HTN, GERD sent in by PCP for evaluation of RLE cellulitis. Admitted for RLE cellulitis.     Plan:   RLE cellulitis with ulcer  - Patient asymptomatic  - Afebrile, normotensive, non-tachycardic, does not meet SIRS criteria   - Likely due to DM/PAD   - No leukocytosis   - Blood cultures ordered   - Given Cefazolin 1000 mg IV x1 in ED   - Placed on Zosyn 3.375g IV q8   - Vancomycin 1000 mg IV x1, continue if MRSA swab +  - MRSA swab ordered   - VA physiol extremity Lower 3+ level, BI ordered   - Xray tibia + Fibula r/o OM ordered   - Follow up cultures/imaging   - Trend Fever  - Trend WBC  - Vascular following   - ID consult placed in AM     Pseudohyponatremia  -Corrected Na WNL. Trend BMP.     CAD  - Hx stent   - continue home med  Aspirin 81 mg qd, Clopidogrel 75 mg qd.     IDDM   - Home meds metformin 1000 mg BID held  - DC w/ insulin Lantus 26 U on previous admission 3/4 , patient says she does not take it.   - BG elevated on admission   - HgbA1c 7.2 3/4   - HgbA1c in AM   - Lantus 20 U bedtime   - Sliding scale moderate   - Maintain -180    HTN  - ordered home meds metoprolol succinate 100mg qd, amlodipine 10 mg qd, valsartan 160 mg qd, isosorbide mononitrate 30 mg qd     PAD  - order home meds atorvastatin 80 mg qd

## 2024-05-09 NOTE — ED ADULT NURSE NOTE - PAIN: BODY LOCATION
Airway       Patient location during procedure: OR       Procedure Start/Stop Times: 1/3/2023 8:26 AM  Staff -        CRNA: Matilde Higuera APRN CRNA       Performed By: CRNAIndications and Patient Condition       Indications for airway management: danielle-procedural       Induction type:inhalational       Mask difficulty assessment: 2 - vent by mask + OA or adjuvant +/- NMBA    Final Airway Details       Final airway type: endotracheal airway       Successful airway: ETT - single  Endotracheal Airway Details        ETT size (mm): 4.0       Cuffed: yes       Successful intubation technique: direct laryngoscopy       DL Blade Type: [place] 1.5       Grade View of Cords: 1       Adjucts: stylet       Position: Right       Measured from: gums/teeth       Secured at (cm): 12       Bite block used: None    Post intubation assessment        Placement verified by: capnometry, equal breath sounds and chest rise        Number of attempts at approach: 1       Number of other approaches attempted: 0       Secured with: silk tape       Ease of procedure: easy       Dentition: Intact and Unchanged    Medication(s) Administered   Medication Administration Time: 1/3/2023 8:26 AM      
leg/Right:

## 2024-05-09 NOTE — PATIENT PROFILE ADULT - NSTRANSFERBELONGINGSDISPO_GEN_A_NUR
given to family Mohs Histo Method Verbiage: Each section was then chromacoded and processed in the Mohs lab using the Mohs protocol and submitted for frozen section.

## 2024-05-09 NOTE — ED ADULT NURSE NOTE - ED STAT RN HANDOFF DETAILS
Report given to  Li HENRIQUEZ in CDU z tent 1. No apparent distress noted at this time. Vital signs stable. Respirations even and unlabored. Care transferred. Family at bedside.

## 2024-05-09 NOTE — H&P ADULT - NSHPPHYSICALEXAM_GEN_ALL_CORE
VITALS:   T(C): 37.1 (05-09-24 @ 21:17), Max: 37.1 (05-09-24 @ 21:17)  HR: 86 (05-09-24 @ 21:17) (74 - 86)  BP: 162/84 (05-09-24 @ 21:17) (128/80 - 162/84)  RR: 17 (05-09-24 @ 21:17) (16 - 18)  SpO2: 98% (05-09-24 @ 21:17) (98% - 100%)    GENERAL: NAD, lying in bed comfortably  HEAD:  Atraumatic, normocephalic  EYES: EOMI, PERRLA, conjunctiva and sclera clear  ENT: Moist mucous membranes. Right corner of mouth droop, chronic.   NECK: Supple, no JVD  HEART: Regular rate and rhythm, no murmurs, rubs, or gallops  LUNGS: Unlabored respirations.  Clear to auscultation bilaterally, no crackles, wheezing, or rhonchi  ABDOMEN: Soft, nontender, nondistended, +BS  EXTREMITIES: 1+ peripheral pulses bilaterally LE. 2+ peripheral pulses b/l UEs. No clubbing, cyanosis, or edema. RLE ulcer [id number] w/ erythema surrounding ulcer, tenderness.   NERVOUS SYSTEM:  A&Ox3, no focal deficits   SKIN: No rashes or lesions VITALS:   T(C): 37.1 (05-09-24 @ 21:17), Max: 37.1 (05-09-24 @ 21:17)  HR: 86 (05-09-24 @ 21:17) (74 - 86)  BP: 162/84 (05-09-24 @ 21:17) (128/80 - 162/84)  RR: 17 (05-09-24 @ 21:17) (16 - 18)  SpO2: 98% (05-09-24 @ 21:17) (98% - 100%)    GENERAL: NAD, lying in bed comfortably  HEAD:  Atraumatic, normocephalic  EYES: EOMI, PERRLA, conjunctiva and sclera clear  ENT: Moist mucous membranes. Right corner of mouth droop, chronic.   NECK: Supple, no JVD  HEART: Regular rate and rhythm, no murmurs, rubs, or gallops  LUNGS: Unlabored respirations.  Clear to auscultation bilaterally, no crackles, wheezing, or rhonchi  ABDOMEN: Soft, nontender, nondistended, +BS  EXTREMITIES: 1+ peripheral pulses bilaterally LE. 2+ peripheral pulses b/l UEs. No clubbing, cyanosis, or edema. RLE ulcer [id number] w/ erythema surrounding ulcer, tenderness.   NERVOUS SYSTEM:  A&Ox3, right facial droop/weakness in right hand /weakness in right leg.    SKIN: No rashes or lesions
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
## 2024-05-09 NOTE — ED PROVIDER NOTE - OBJECTIVE STATEMENT
73-year-old female comes in with a history of a right lower extremity ulcer with an eschar x 2 months has been on outpatient antibiotics with no resolution.  Patient complains of pain and surrounding redness.  No fever.  Patient is also being evaluated by cardiology for other complaints.  She was referred by her primary to the emergency room for evaluation of this eschar and debridement.
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
## 2024-05-10 ENCOUNTER — NON-APPOINTMENT (OUTPATIENT)
Age: 74
End: 2024-05-10

## 2024-05-10 ENCOUNTER — APPOINTMENT (OUTPATIENT)
Dept: CARDIOLOGY | Facility: CLINIC | Age: 74
End: 2024-05-10

## 2024-05-10 LAB
A1C WITH ESTIMATED AVERAGE GLUCOSE RESULT: 8.8 % — HIGH (ref 4–5.6)
ALBUMIN SERPL ELPH-MCNC: 4.1 G/DL — SIGNIFICANT CHANGE UP (ref 3.3–5.2)
ALP SERPL-CCNC: 112 U/L — SIGNIFICANT CHANGE UP (ref 40–120)
ALT FLD-CCNC: 16 U/L — SIGNIFICANT CHANGE UP
ANION GAP SERPL CALC-SCNC: 14 MMOL/L
ANION GAP SERPL CALC-SCNC: 15 MMOL/L — SIGNIFICANT CHANGE UP (ref 5–17)
AST SERPL-CCNC: 16 U/L — SIGNIFICANT CHANGE UP
BILIRUB SERPL-MCNC: 0.3 MG/DL — LOW (ref 0.4–2)
BUN SERPL-MCNC: 17 MG/DL
BUN SERPL-MCNC: 17.9 MG/DL — SIGNIFICANT CHANGE UP (ref 8–20)
CALCIUM SERPL-MCNC: 9 MG/DL — SIGNIFICANT CHANGE UP (ref 8.4–10.5)
CALCIUM SERPL-MCNC: 9.8 MG/DL
CHLORIDE SERPL-SCNC: 90 MMOL/L
CHLORIDE SERPL-SCNC: 95 MMOL/L — LOW (ref 96–108)
CHOLEST SERPL-MCNC: 140 MG/DL — SIGNIFICANT CHANGE UP
CO2 SERPL-SCNC: 21 MMOL/L
CO2 SERPL-SCNC: 22 MMOL/L — SIGNIFICANT CHANGE UP (ref 22–29)
CREAT SERPL-MCNC: 1.17 MG/DL — SIGNIFICANT CHANGE UP (ref 0.5–1.3)
CREAT SERPL-MCNC: 1.24 MG/DL
EGFR: 47 ML/MIN/1.73M2
EGFR: 50 ML/MIN/1.73M2 — LOW
ESTIMATED AVERAGE GLUCOSE: 206 MG/DL — HIGH (ref 68–114)
GLUCOSE BLDC GLUCOMTR-MCNC: 191 MG/DL — HIGH (ref 70–99)
GLUCOSE BLDC GLUCOMTR-MCNC: 204 MG/DL — HIGH (ref 70–99)
GLUCOSE BLDC GLUCOMTR-MCNC: 249 MG/DL — HIGH (ref 70–99)
GLUCOSE BLDC GLUCOMTR-MCNC: 263 MG/DL — HIGH (ref 70–99)
GLUCOSE BLDC GLUCOMTR-MCNC: 85 MG/DL — SIGNIFICANT CHANGE UP (ref 70–99)
GLUCOSE BLDC GLUCOMTR-MCNC: >600 MG/DL — CRITICAL HIGH (ref 70–99)
GLUCOSE SERPL-MCNC: 173 MG/DL
GLUCOSE SERPL-MCNC: 247 MG/DL — HIGH (ref 70–99)
HCT VFR BLD CALC: 33.6 % — LOW (ref 34.5–45)
HDLC SERPL-MCNC: 44 MG/DL — LOW
HGB BLD-MCNC: 11.1 G/DL — LOW (ref 11.5–15.5)
INR BLD: 1.08 RATIO — SIGNIFICANT CHANGE UP (ref 0.85–1.18)
LIPID PNL WITH DIRECT LDL SERPL: 59 MG/DL — SIGNIFICANT CHANGE UP
MCHC RBC-ENTMCNC: 28.2 PG — SIGNIFICANT CHANGE UP (ref 27–34)
MCHC RBC-ENTMCNC: 33 GM/DL — SIGNIFICANT CHANGE UP (ref 32–36)
MCV RBC AUTO: 85.3 FL — SIGNIFICANT CHANGE UP (ref 80–100)
MRSA PCR RESULT.: SIGNIFICANT CHANGE UP
NON HDL CHOLESTEROL: 96 MG/DL — SIGNIFICANT CHANGE UP
PLATELET # BLD AUTO: 373 K/UL — SIGNIFICANT CHANGE UP (ref 150–400)
POTASSIUM SERPL-MCNC: 4.5 MMOL/L — SIGNIFICANT CHANGE UP (ref 3.5–5.3)
POTASSIUM SERPL-SCNC: 4.5 MMOL/L — SIGNIFICANT CHANGE UP (ref 3.5–5.3)
POTASSIUM SERPL-SCNC: 6.1 MMOL/L
PROT SERPL-MCNC: 7.2 G/DL — SIGNIFICANT CHANGE UP (ref 6.6–8.7)
PROTHROM AB SERPL-ACNC: 12 SEC — SIGNIFICANT CHANGE UP (ref 9.5–13)
RBC # BLD: 3.94 M/UL — SIGNIFICANT CHANGE UP (ref 3.8–5.2)
RBC # FLD: 13.6 % — SIGNIFICANT CHANGE UP (ref 10.3–14.5)
S AUREUS DNA NOSE QL NAA+PROBE: SIGNIFICANT CHANGE UP
SODIUM SERPL-SCNC: 125 MMOL/L
SODIUM SERPL-SCNC: 132 MMOL/L — LOW (ref 135–145)
TRIGL SERPL-MCNC: 187 MG/DL — HIGH
WBC # BLD: 9.41 K/UL — SIGNIFICANT CHANGE UP (ref 3.8–10.5)
WBC # FLD AUTO: 9.41 K/UL — SIGNIFICANT CHANGE UP (ref 3.8–10.5)

## 2024-05-10 PROCEDURE — 76937 US GUIDE VASCULAR ACCESS: CPT | Mod: 26

## 2024-05-10 PROCEDURE — 37228: CPT | Mod: GC,RT

## 2024-05-10 PROCEDURE — 75710 ARTERY X-RAYS ARM/LEG: CPT | Mod: 26,GC,59

## 2024-05-10 PROCEDURE — 36245 INS CATH ABD/L-EXT ART 1ST: CPT | Mod: GC,59

## 2024-05-10 PROCEDURE — 99233 SBSQ HOSP IP/OBS HIGH 50: CPT | Mod: GC

## 2024-05-10 PROCEDURE — 37224: CPT | Mod: GC,RT

## 2024-05-10 RX ORDER — VANCOMYCIN HCL 1 G
1000 VIAL (EA) INTRAVENOUS ONCE
Refills: 0 | Status: COMPLETED | OUTPATIENT
Start: 2024-05-10 | End: 2024-05-10

## 2024-05-10 RX ORDER — SODIUM CHLORIDE 9 MG/ML
400 INJECTION INTRAMUSCULAR; INTRAVENOUS; SUBCUTANEOUS
Refills: 0 | Status: DISCONTINUED | OUTPATIENT
Start: 2024-05-10 | End: 2024-05-14

## 2024-05-10 RX ADMIN — HEPARIN SODIUM 5000 UNIT(S): 5000 INJECTION INTRAVENOUS; SUBCUTANEOUS at 05:51

## 2024-05-10 RX ADMIN — INSULIN GLARGINE 20 UNIT(S): 100 INJECTION, SOLUTION SUBCUTANEOUS at 00:25

## 2024-05-10 RX ADMIN — PIPERACILLIN AND TAZOBACTAM 200 GRAM(S): 4; .5 INJECTION, POWDER, LYOPHILIZED, FOR SOLUTION INTRAVENOUS at 03:21

## 2024-05-10 RX ADMIN — Medication 650 MILLIGRAM(S): at 11:11

## 2024-05-10 RX ADMIN — PIPERACILLIN AND TAZOBACTAM 25 GRAM(S): 4; .5 INJECTION, POWDER, LYOPHILIZED, FOR SOLUTION INTRAVENOUS at 21:58

## 2024-05-10 RX ADMIN — PIPERACILLIN AND TAZOBACTAM 25 GRAM(S): 4; .5 INJECTION, POWDER, LYOPHILIZED, FOR SOLUTION INTRAVENOUS at 13:00

## 2024-05-10 RX ADMIN — Medication 650 MILLIGRAM(S): at 10:41

## 2024-05-10 RX ADMIN — GABAPENTIN 300 MILLIGRAM(S): 400 CAPSULE ORAL at 21:57

## 2024-05-10 RX ADMIN — Medication 4: at 08:46

## 2024-05-10 RX ADMIN — ATORVASTATIN CALCIUM 80 MILLIGRAM(S): 80 TABLET, FILM COATED ORAL at 21:57

## 2024-05-10 RX ADMIN — Medication 250 MILLIGRAM(S): at 00:35

## 2024-05-10 RX ADMIN — INSULIN GLARGINE 20 UNIT(S): 100 INJECTION, SOLUTION SUBCUTANEOUS at 21:57

## 2024-05-10 RX ADMIN — Medication 100 MILLIGRAM(S): at 05:51

## 2024-05-10 NOTE — BRIEF OPERATIVE NOTE - COMMENTS
Wound Class 1  Lay flat 4 hours  b/l PT doppler signals after case  Continue ASA/Plavix  Continue fluids

## 2024-05-10 NOTE — PROGRESS NOTE ADULT - ASSESSMENT
73 F PMHx CAD s/p stent, CVA, PAD, HB s/p PPM, IDDM, HTN, GERD sent in by PCP for evaluation of RLE cellulitis. Admitted for RLE cellulitis.     RLE cellulitis with ulcer  - Patient asymptomatic  - Afebrile, normotensive, non-tachycardic, does not meet SIRS criteria   - Likely due to DM/PAD   - BCx - NGTD  - Placed on Zosyn 3.375g IV q8   - Vancomycin 1000 mg IV x1, continue if MRSA swab +  - MRSA collected, f/u results  - Follow cultures  - ANGEL - Findings suggestive of iliofemoral level disease on the right and   femoropopliteal disease on the left.  - Xray tibia + Fibula done, follow up read  - Vascular following, NPO for angiogram  - ID consulted, follow up recs    Pseudohyponatremia  -Corrected Na WNL  - Monitor    CAD  - Hx stent   - continue home med  Aspirin 81 mg qd, Clopidogrel 75 mg qd.     IDDM   - A1C: 8.8  - Home meds metformin 1000 mg BID held  - DC w/ insulin Lantus 26 U on previous admission 3/4 , patient says she does not take it.   - BG elevated on admission   - Lantus 20 U bedtime   - Sliding scale moderate     HTN  - ordered home meds metoprolol succinate 100mg qd, amlodipine 10 mg qd, valsartan 160 mg qd, isosorbide mononitrate 30 mg qd     PAD  - order home meds atorvastatin 80 mg qd.     DVT ppx: Heparin SQ

## 2024-05-10 NOTE — PROGRESS NOTE ADULT - ASSESSMENT
ASSESSMENT: 74yo F w/ hx of PAD with nonhealing ulcer of RLE. ANGEL/PVR completed. To go to cath lab today for angio.     PLAN:  - angiogram today  - NPO for procedure  - recommend continued abx for cellulitis  - care per primary team

## 2024-05-10 NOTE — PROGRESS NOTE ADULT - SUBJECTIVE AND OBJECTIVE BOX
SUBJECTIVE: Resting comfortably this morning. No pain in lower extremities. Erythema around lower extremity ulcer with improved appearance. HDS, no acute events overnight.     Vitals:  T(C): 36.6 (05-10-24 @ 04:18), Max: 37.1 (05-09-24 @ 21:17)  T(F): 97.9 (05-10-24 @ 04:18), Max: 98.8 (05-09-24 @ 21:17)  HR: 75 (05-10-24 @ 04:18) (74 - 86)  BP: 115/66 (05-10-24 @ 04:18) (111/65 - 162/84)  ABP: --  ABP(mean): --  RR: 17 (05-10-24 @ 04:18) (16 - 18)  SpO2: 99% (05-10-24 @ 04:18) (96% - 100%)      LABS:  cret                        11.1   9.41  )-----------( 373      ( 10 May 2024 03:33 )             33.6     05-10    132<L>  |  95<L>  |  17.9  ----------------------------<  247<H>  4.5   |  22.0  |  1.17    Ca    9.0      10 May 2024 03:33    TPro  7.2  /  Alb  4.1  /  TBili  0.3<L>  /  DBili  x   /  AST  16  /  ALT  16  /  AlkPhos  112  05-10    PT/INR - ( 10 May 2024 03:33 )   PT: 12.0 sec;   INR: 1.08 ratio         PTT - ( 09 May 2024 17:34 )  PTT:32.8 sec    Labs:  GENERAL: NAD, lying in bed comfortably  HEAD:  Atraumatic, normocephalic  NECK: Supple, no JVD  HEART: RRR  LUNGS: Unlabored respirations. No conversational dyspnea.   VASCULAR: bilateral lower extremities warm and well perfused. motor and sensory intact  - RLE: PT signal, 4cm ulcer to anterior shin with escar  - LLE: palpable DP, PT signal  EXTREMITIES: No clubbing, cyanosis, or edema  NERVOUS SYSTEM:  A&Ox3, no focal deficits

## 2024-05-10 NOTE — BRIEF OPERATIVE NOTE - NSICDXBRIEFPROCEDURE_GEN_ALL_CORE_FT
PROCEDURES:  Angiogram, lower extremity 10-May-2024 19:11:54  Gem Short  Fluoroscopic angiography guidance for angioplasty of tibioperoneal artery of right lower extremity with intra-arterial contrast 10-May-2024 19:14:18  Gem Short

## 2024-05-10 NOTE — PROGRESS NOTE ADULT - SUBJECTIVE AND OBJECTIVE BOX
Interventional Cardiology NP pre/post procedure note:     -For RLE angiogram    TELE: paced 64bpm    MEDICATIONS  (STANDING):  amLODIPine   Tablet 10 milliGRAM(s) Oral daily  aspirin enteric coated 81 milliGRAM(s) Oral daily  atorvastatin 80 milliGRAM(s) Oral at bedtime  clopidogrel Tablet 75 milliGRAM(s) Oral daily  dextrose 10% Bolus 125 milliLiter(s) IV Bolus once  dextrose 5%. 1000 milliLiter(s) (100 mL/Hr) IV Continuous <Continuous>  dextrose 5%. 1000 milliLiter(s) (50 mL/Hr) IV Continuous <Continuous>  dextrose 50% Injectable 25 Gram(s) IV Push once  dextrose 50% Injectable 12.5 Gram(s) IV Push once  gabapentin 300 milliGRAM(s) Oral at bedtime  glucagon  Injectable 1 milliGRAM(s) IntraMuscular once  heparin   Injectable 5000 Unit(s) SubCutaneous every 12 hours  insulin glargine Injectable (LANTUS) 20 Unit(s) SubCutaneous at bedtime  insulin lispro (ADMELOG) corrective regimen sliding scale   SubCutaneous three times a day before meals  isosorbide   mononitrate ER Tablet (IMDUR) 30 milliGRAM(s) Oral daily  metoprolol succinate  milliGRAM(s) Oral daily  piperacillin/tazobactam IVPB.. 3.375 Gram(s) IV Intermittent every 8 hours  valsartan 160 milliGRAM(s) Oral daily    MEDICATIONS  (PRN):  acetaminophen     Tablet .. 650 milliGRAM(s) Oral every 6 hours PRN Temp greater or equal to 38C (100.4F), Mild Pain (1 - 3)  aluminum hydroxide/magnesium hydroxide/simethicone Suspension 30 milliLiter(s) Oral every 4 hours PRN Dyspepsia  dextrose Oral Gel 15 Gram(s) Oral once PRN Blood Glucose LESS THAN 70 milliGRAM(s)/deciliter  melatonin 3 milliGRAM(s) Oral at bedtime PRN Insomnia  ondansetron Injectable 4 milliGRAM(s) IV Push every 8 hours PRN Nausea and/or Vomiting      Allergies:  No Known Allergies      PAST MEDICAL & SURGICAL HISTORY:  DM (diabetes mellitus)      HTN (hypertension)      H/O gastroesophageal reflux (GERD)      Cardiac pacemaker  MICRA for mobitz type 11      CVA (cerebrovascular accident)  resdiual right sided weakness      CVA (cerebrovascular accident)      History of benign brain tumor          Vital Signs Last 24 Hrs  T(C): 36.4 (10 May 2024 10:55), Max: 37.1 (09 May 2024 21:17)  T(F): 97.5 (10 May 2024 10:55), Max: 98.8 (09 May 2024 21:17)  HR: 63 (10 May 2024 10:55) (63 - 86)  BP: 114/69 (10 May 2024 10:55) (111/65 - 162/84)  BP(mean): --  RR: 18 (10 May 2024 10:55) (17 - 18)  SpO2: 98% (10 May 2024 10:55) (96% - 100%)    Parameters below as of 10 May 2024 10:55  Patient On (Oxygen Delivery Method): room air        Physical Exam:  Constitutional: NAD, lying in bed comfortably  Cardiovascular: +S1S2 RRR  Pulmonary: CTA b/l, unlabored  GI: soft NTND +BS  Extremities: bilateral lower extremities warm and well perfused. motor and sensory intact  - RLE: PT signal, 4cm ulcer to anterior shin with escar  - LLE: palpable DP, PT signal   Neuro: non focal, OSPINA x4    LABS:                        11.1   9.41  )-----------( 373      ( 10 May 2024 03:33 )             33.6     05-10    132<L>  |  95<L>  |  17.9  ----------------------------<  247<H>  4.5   |  22.0  |  1.17    Ca    9.0      10 May 2024 03:33    TPro  7.2  /  Alb  4.1  /  TBili  0.3<L>  /  DBili  x   /  AST  16  /  ALT  16  /  AlkPhos  112  05-10    PT/INR - ( 10 May 2024 03:33 )   PT: 12.0 sec;   INR: 1.08 ratio         PTT - ( 09 May 2024 17:34 )  PTT:32.8 sec  Urinalysis Basic - ( 10 May 2024 03:33 )    Color: x / Appearance: x / SG: x / pH: x  Gluc: 247 mg/dL / Ketone: x  / Bili: x / Urobili: x   Blood: x / Protein: x / Nitrite: x   Leuk Esterase: x / RBC: x / WBC x   Sq Epi: x / Non Sq Epi: x / Bacteria: x        RADIOLOGY & ADDITIONAL TESTS:

## 2024-05-10 NOTE — PROGRESS NOTE ADULT - ASSESSMENT
74yo F w/ hx of PAD with nonhealing ulcer of RLE. ANGEL/PVR completed.  Presents to cath lab today for angio.     PLAN:  - angiogram today  - NPO for procedure  - Consent to be obtained by MD 72yo F w/ hx of PAD with nonhealing ulcer of RLE. ANGEL/PVR completed.  Presents to cath lab today for angio.     PLAN:  - angiogram today  - NPO for procedure  - Consent to be obtained by MD    INTERVENTIONAL CARDIOLOGY NP ADDENDUM:    -s/p RLE angiogram via LFA with Dr. Santos: POBA to right TP trunk and peroneal arteries (prelim report; official report to follow)  Visipaque 97cc  Heparin 8,000 units IV  Protamine 20mg IV  -LFA mynx site benign without hematoma/bleeding; no bruit; + bilateral PT pulses via doppler;   -Bedrest with HOB < 30 degrees x 4 hours until 2300 then may sit up; OOB at midnight  -IVF hydration post procedure x 4 hours to prevent SALO  -Continue ASA/Plavix daily  -Discussed with Vascular resident

## 2024-05-10 NOTE — CHART NOTE - NSCHARTNOTEFT_GEN_A_CORE
Post-op check:    Pt is a 73F s/p Angiogram, Angioplasty of Tibioperoneal Artery of RLE.     Vitals:    Vital Signs Last 24 Hrs  T(C): 36.8 (10 May 2024 13:20), Max: 36.9 (09 May 2024 23:45)  T(F): 98.3 (10 May 2024 13:20), Max: 98.5 (10 May 2024 08:38)  HR: 67 (10 May 2024 20:15) (63 - 75)  BP: 127/67 (10 May 2024 20:15) (111/65 - 149/65)  BP(mean): --  RR: 16 (10 May 2024 20:15) (16 - 18)  SpO2: 96% (10 May 2024 20:15) (96% - 100%)    Parameters below as of 10 May 2024 20:15  Patient On (Oxygen Delivery Method): room air      Patient resting in bed. Pt denies pain at this time. Denies nausea, vomiting, SOB, chest pain.     Physical exam:  General: alert, patient resting in bed comfortably, in NAD  Resp: equal chest rise bilaterally, nonlabored breathing  Cardio: RRR  Abdomen: soft, NT, ND  Extremities: moving all extremities spontaneously, L groin access site soft with dressing c/d/i, RLE with ulcer on shin, B/L DP and PT signals, palp R DP    Plan:     -pain control PRN  -encourage OOB, ambulation  -DVT ppx: SCDs, ASA, Plavix, and HSQ

## 2024-05-10 NOTE — BRIEF OPERATIVE NOTE - OPERATION/FINDINGS
RLE angiogram via L common femoral artery access, 5Fr sheath. Stenosis of the R superficial femoral artery, R TP trunk, R peroneal artery, R posterial tibial artery with distal occlusion, and R anterior tibial artery. Balloon angioplasty of the R superficial femoral artery with a 3mm balloon, R TP trunk with a 5mm balloon, R peroneal artery with a 3mm balloon. Improved filling after intervention. Access site closed with Mynx closure device, hemostasis achieved at end of case. Patient tolerated well.

## 2024-05-10 NOTE — PROGRESS NOTE ADULT - SUBJECTIVE AND OBJECTIVE BOX
[facility] Division of Hospital Medicine  Arthur Kumar MD    Chief Complaint:  Patient is a 73y old  Female who presents with a chief complaint of RLE cellulitis w/ ulcer (10 May 2024 06:45)      SUBJECTIVE / OVERNIGHT EVENTS:  Patient seen and examined at bedside. No acute events reported overnight. No new complaints.    MEDICATIONS  (STANDING):  amLODIPine   Tablet 10 milliGRAM(s) Oral daily  aspirin enteric coated 81 milliGRAM(s) Oral daily  atorvastatin 80 milliGRAM(s) Oral at bedtime  clopidogrel Tablet 75 milliGRAM(s) Oral daily  dextrose 10% Bolus 125 milliLiter(s) IV Bolus once  dextrose 5%. 1000 milliLiter(s) (100 mL/Hr) IV Continuous <Continuous>  dextrose 5%. 1000 milliLiter(s) (50 mL/Hr) IV Continuous <Continuous>  dextrose 50% Injectable 25 Gram(s) IV Push once  dextrose 50% Injectable 12.5 Gram(s) IV Push once  gabapentin 300 milliGRAM(s) Oral at bedtime  glucagon  Injectable 1 milliGRAM(s) IntraMuscular once  heparin   Injectable 5000 Unit(s) SubCutaneous every 12 hours  insulin glargine Injectable (LANTUS) 20 Unit(s) SubCutaneous at bedtime  insulin lispro (ADMELOG) corrective regimen sliding scale   SubCutaneous three times a day before meals  isosorbide   mononitrate ER Tablet (IMDUR) 30 milliGRAM(s) Oral daily  metoprolol succinate  milliGRAM(s) Oral daily  piperacillin/tazobactam IVPB.. 3.375 Gram(s) IV Intermittent every 8 hours  valsartan 160 milliGRAM(s) Oral daily  vancomycin  IVPB        MEDICATIONS  (PRN):  acetaminophen     Tablet .. 650 milliGRAM(s) Oral every 6 hours PRN Temp greater or equal to 38C (100.4F), Mild Pain (1 - 3)  aluminum hydroxide/magnesium hydroxide/simethicone Suspension 30 milliLiter(s) Oral every 4 hours PRN Dyspepsia  dextrose Oral Gel 15 Gram(s) Oral once PRN Blood Glucose LESS THAN 70 milliGRAM(s)/deciliter  melatonin 3 milliGRAM(s) Oral at bedtime PRN Insomnia  ondansetron Injectable 4 milliGRAM(s) IV Push every 8 hours PRN Nausea and/or Vomiting        I&O's Summary      PHYSICAL EXAM:  Vital Signs Last 24 Hrs  T(C): 36.4 (10 May 2024 10:55), Max: 37.1 (09 May 2024 21:17)  T(F): 97.5 (10 May 2024 10:55), Max: 98.8 (09 May 2024 21:17)  HR: 63 (10 May 2024 10:55) (63 - 86)  BP: 114/69 (10 May 2024 10:55) (111/65 - 162/84)  BP(mean): --  RR: 18 (10 May 2024 10:55) (16 - 18)  SpO2: 98% (10 May 2024 10:55) (96% - 100%)    Parameters below as of 10 May 2024 10:55  Patient On (Oxygen Delivery Method): room air      CONSTITUTIONAL: NAD  HEENT: NC/AT, PERRL, no JVD  RESPIRATORY: CTA bilaterally, normal effort  CARDIOVASCULAR: RRR, S1/S2+, no m/g/r  ABDOMEN: Nontender to palpation, normoactive bowel sounds, no rebound/guarding  MUSCULOSKELETAL: No clubbing, cyanosis, or edema. RLE ulcer w/ erythema and surrounding ulcer  PSYCH: Calm, affect appropriate.  NEUROLOGY: Awake, alert, no focal neurological deficits.   SKIN: No rashes; no palpable lesions  VASC: Distal pulses palpable    LABS:                        11.1   9.41  )-----------( 373      ( 10 May 2024 03:33 )             33.6     05-10    132<L>  |  95<L>  |  17.9  ----------------------------<  247<H>  4.5   |  22.0  |  1.17    Ca    9.0      10 May 2024 03:33    TPro  7.2  /  Alb  4.1  /  TBili  0.3<L>  /  DBili  x   /  AST  16  /  ALT  16  /  AlkPhos  112  05-10    PT/INR - ( 10 May 2024 03:33 )   PT: 12.0 sec;   INR: 1.08 ratio         PTT - ( 09 May 2024 17:34 )  PTT:32.8 sec      Urinalysis Basic - ( 10 May 2024 03:33 )    Color: x / Appearance: x / SG: x / pH: x  Gluc: 247 mg/dL / Ketone: x  / Bili: x / Urobili: x   Blood: x / Protein: x / Nitrite: x   Leuk Esterase: x / RBC: x / WBC x   Sq Epi: x / Non Sq Epi: x / Bacteria: x        CAPILLARY BLOOD GLUCOSE      POCT Blood Glucose.: 204 mg/dL (10 May 2024 08:40)  POCT Blood Glucose.: 181 mg/dL (09 May 2024 23:27)        RADIOLOGY & ADDITIONAL TESTS:  Results Reviewed:   Imaging Personally Reviewed:  Electrocardiogram Personally Reviewed:

## 2024-05-11 LAB
ALBUMIN SERPL ELPH-MCNC: 4.1 G/DL — SIGNIFICANT CHANGE UP (ref 3.3–5.2)
ALP SERPL-CCNC: 86 U/L — SIGNIFICANT CHANGE UP (ref 40–120)
ALT FLD-CCNC: 15 U/L — SIGNIFICANT CHANGE UP
ANION GAP SERPL CALC-SCNC: 14 MMOL/L — SIGNIFICANT CHANGE UP (ref 5–17)
AST SERPL-CCNC: 14 U/L — SIGNIFICANT CHANGE UP
BILIRUB SERPL-MCNC: 0.5 MG/DL — SIGNIFICANT CHANGE UP (ref 0.4–2)
BUN SERPL-MCNC: 22.9 MG/DL — HIGH (ref 8–20)
CALCIUM SERPL-MCNC: 9.1 MG/DL — SIGNIFICANT CHANGE UP (ref 8.4–10.5)
CHLORIDE SERPL-SCNC: 99 MMOL/L — SIGNIFICANT CHANGE UP (ref 96–108)
CO2 SERPL-SCNC: 21 MMOL/L — LOW (ref 22–29)
CREAT SERPL-MCNC: 1.27 MG/DL — SIGNIFICANT CHANGE UP (ref 0.5–1.3)
EGFR: 45 ML/MIN/1.73M2 — LOW
GLUCOSE BLDC GLUCOMTR-MCNC: 111 MG/DL — HIGH (ref 70–99)
GLUCOSE BLDC GLUCOMTR-MCNC: 194 MG/DL — HIGH (ref 70–99)
GLUCOSE BLDC GLUCOMTR-MCNC: 220 MG/DL — HIGH (ref 70–99)
GLUCOSE BLDC GLUCOMTR-MCNC: 231 MG/DL — HIGH (ref 70–99)
GLUCOSE SERPL-MCNC: 226 MG/DL — HIGH (ref 70–99)
HCT VFR BLD CALC: 35.4 % — SIGNIFICANT CHANGE UP (ref 34.5–45)
HGB BLD-MCNC: 11.6 G/DL — SIGNIFICANT CHANGE UP (ref 11.5–15.5)
MCHC RBC-ENTMCNC: 28.8 PG — SIGNIFICANT CHANGE UP (ref 27–34)
MCHC RBC-ENTMCNC: 32.8 GM/DL — SIGNIFICANT CHANGE UP (ref 32–36)
MCV RBC AUTO: 87.8 FL — SIGNIFICANT CHANGE UP (ref 80–100)
PLATELET # BLD AUTO: 353 K/UL — SIGNIFICANT CHANGE UP (ref 150–400)
POTASSIUM SERPL-MCNC: 4.4 MMOL/L — SIGNIFICANT CHANGE UP (ref 3.5–5.3)
POTASSIUM SERPL-SCNC: 4.4 MMOL/L — SIGNIFICANT CHANGE UP (ref 3.5–5.3)
PROT SERPL-MCNC: 7 G/DL — SIGNIFICANT CHANGE UP (ref 6.6–8.7)
RBC # BLD: 4.03 M/UL — SIGNIFICANT CHANGE UP (ref 3.8–5.2)
RBC # FLD: 14.1 % — SIGNIFICANT CHANGE UP (ref 10.3–14.5)
SODIUM SERPL-SCNC: 134 MMOL/L — LOW (ref 135–145)
WBC # BLD: 9.41 K/UL — SIGNIFICANT CHANGE UP (ref 3.8–10.5)
WBC # FLD AUTO: 9.41 K/UL — SIGNIFICANT CHANGE UP (ref 3.8–10.5)

## 2024-05-11 PROCEDURE — 99232 SBSQ HOSP IP/OBS MODERATE 35: CPT

## 2024-05-11 RX ADMIN — Medication 4: at 17:18

## 2024-05-11 RX ADMIN — CLOPIDOGREL BISULFATE 75 MILLIGRAM(S): 75 TABLET, FILM COATED ORAL at 12:53

## 2024-05-11 RX ADMIN — Medication 100 MILLIGRAM(S): at 05:29

## 2024-05-11 RX ADMIN — PIPERACILLIN AND TAZOBACTAM 25 GRAM(S): 4; .5 INJECTION, POWDER, LYOPHILIZED, FOR SOLUTION INTRAVENOUS at 12:53

## 2024-05-11 RX ADMIN — GABAPENTIN 300 MILLIGRAM(S): 400 CAPSULE ORAL at 23:10

## 2024-05-11 RX ADMIN — ATORVASTATIN CALCIUM 80 MILLIGRAM(S): 80 TABLET, FILM COATED ORAL at 23:11

## 2024-05-11 RX ADMIN — Medication 4: at 12:53

## 2024-05-11 RX ADMIN — Medication 81 MILLIGRAM(S): at 12:52

## 2024-05-11 RX ADMIN — AMLODIPINE BESYLATE 10 MILLIGRAM(S): 2.5 TABLET ORAL at 05:29

## 2024-05-11 RX ADMIN — HEPARIN SODIUM 5000 UNIT(S): 5000 INJECTION INTRAVENOUS; SUBCUTANEOUS at 17:18

## 2024-05-11 RX ADMIN — INSULIN GLARGINE 20 UNIT(S): 100 INJECTION, SOLUTION SUBCUTANEOUS at 23:11

## 2024-05-11 RX ADMIN — ISOSORBIDE MONONITRATE 30 MILLIGRAM(S): 60 TABLET, EXTENDED RELEASE ORAL at 12:53

## 2024-05-11 RX ADMIN — PIPERACILLIN AND TAZOBACTAM 25 GRAM(S): 4; .5 INJECTION, POWDER, LYOPHILIZED, FOR SOLUTION INTRAVENOUS at 05:28

## 2024-05-11 RX ADMIN — PIPERACILLIN AND TAZOBACTAM 25 GRAM(S): 4; .5 INJECTION, POWDER, LYOPHILIZED, FOR SOLUTION INTRAVENOUS at 23:11

## 2024-05-11 RX ADMIN — Medication 650 MILLIGRAM(S): at 20:00

## 2024-05-11 RX ADMIN — VALSARTAN 160 MILLIGRAM(S): 80 TABLET ORAL at 05:29

## 2024-05-11 RX ADMIN — Medication 650 MILLIGRAM(S): at 06:19

## 2024-05-11 RX ADMIN — Medication 2: at 08:07

## 2024-05-11 RX ADMIN — Medication 650 MILLIGRAM(S): at 07:15

## 2024-05-11 RX ADMIN — HEPARIN SODIUM 5000 UNIT(S): 5000 INJECTION INTRAVENOUS; SUBCUTANEOUS at 05:29

## 2024-05-11 NOTE — PROGRESS NOTE ADULT - ASSESSMENT
73 F PMHx CAD s/p stent, CVA, PAD, HB s/p PPM, IDDM, HTN, GERD sent in by PCP for evaluation of RLE cellulitis. Admitted for RLE cellulitis.     RLE cellulitis with ulcer  Peripheral arterial disease   - s/p right LE angiogram by vascular 5/10/24 to improve blood flow and healing  - cont zosyn   - BCx - NGTD  - Placed on Zosyn 3.375g IV q8  - Follow cultures  - ID consulted, follow up recs    Pseudohyponatremia  - Corrected Na WNL  - Monitor bmp    CAD  - Hx stent   - continue home med  Aspirin 81 mg qd, Clopidogrel 75 mg qd    IDDM   - A1C: 8.8  - Home meds metformin 1000 mg BID held  - DC w/ insulin Lantus 26 U on previous admission 3/4 , patient says she does not take it.   - BG elevated on admission   - Lantus 20 U bedtime   - Sliding scale moderate     HTN  - ordered home meds metoprolol succinate 100mg qd, amlodipine 10 mg qd, valsartan 160 mg qd, isosorbide mononitrate 30 mg qd     PAD  - order home meds atorvastatin 80 mg qd.     DVT ppx: Heparin SQ 73 F PMHx CAD s/p stent, CVA, PAD, HB s/p PPM, IDDM, HTN, GERD sent in by PCP for evaluation of RLE cellulitis. Admitted for RLE cellulitis.     RLE cellulitis with ulcer  Peripheral arterial disease   - s/p right LE angiogram by vascular 5/10/24 to improve blood flow and healing  - cont zosyn   - BCx - NGTD  - Follow cultures  - ID following  Pseudohyponatremia  - Corrected Na WNL  - Monitor bmp    CAD  - Hx stent   - continue home med  Aspirin 81 mg qd, Clopidogrel 75 mg qd    IDDM   - A1C: 8.8  - Home meds metformin 1000 mg BID held  - DC w/ insulin Lantus 26 U on previous admission 3/4 , patient says she does not take it.   - BG elevated on admission   - Lantus 20 U bedtime   - Sliding scale moderate     HTN  - ordered home meds metoprolol succinate 100mg qd, amlodipine 10 mg qd, valsartan 160 mg qd, isosorbide mononitrate 30 mg qd     PAD  - order home meds atorvastatin 80 mg qd.     DVT ppx: Heparin SQ

## 2024-05-11 NOTE — PROGRESS NOTE ADULT - SUBJECTIVE AND OBJECTIVE BOX
Edd Mckoy MD  [facility] Medicine  Contact via Teams or text/call at [phone]    Patient is a 73y old  Female who presents with a chief complaint of RLE cellulitis w/ ulcer (10 May 2024 14:34)    s/p angiogram of RLE    Patient seen and examined at bedside. No overnight events reported.     ALLERGIES:  No Known Allergies    MEDICATIONS  (STANDING):  amLODIPine   Tablet 10 milliGRAM(s) Oral daily  aspirin enteric coated 81 milliGRAM(s) Oral daily  atorvastatin 80 milliGRAM(s) Oral at bedtime  clopidogrel Tablet 75 milliGRAM(s) Oral daily  dextrose 10% Bolus 125 milliLiter(s) IV Bolus once  dextrose 5%. 1000 milliLiter(s) (100 mL/Hr) IV Continuous <Continuous>  dextrose 5%. 1000 milliLiter(s) (50 mL/Hr) IV Continuous <Continuous>  dextrose 50% Injectable 25 Gram(s) IV Push once  dextrose 50% Injectable 12.5 Gram(s) IV Push once  gabapentin 300 milliGRAM(s) Oral at bedtime  glucagon  Injectable 1 milliGRAM(s) IntraMuscular once  heparin   Injectable 5000 Unit(s) SubCutaneous every 12 hours  insulin glargine Injectable (LANTUS) 20 Unit(s) SubCutaneous at bedtime  insulin lispro (ADMELOG) corrective regimen sliding scale   SubCutaneous three times a day before meals  isosorbide   mononitrate ER Tablet (IMDUR) 30 milliGRAM(s) Oral daily  metoprolol succinate  milliGRAM(s) Oral daily  piperacillin/tazobactam IVPB.. 3.375 Gram(s) IV Intermittent every 8 hours  sodium chloride 0.9%. 400 milliLiter(s) (100 mL/Hr) IV Continuous <Continuous>  valsartan 160 milliGRAM(s) Oral daily    MEDICATIONS  (PRN):  acetaminophen     Tablet .. 650 milliGRAM(s) Oral every 6 hours PRN Temp greater or equal to 38C (100.4F), Mild Pain (1 - 3)  aluminum hydroxide/magnesium hydroxide/simethicone Suspension 30 milliLiter(s) Oral every 4 hours PRN Dyspepsia  dextrose Oral Gel 15 Gram(s) Oral once PRN Blood Glucose LESS THAN 70 milliGRAM(s)/deciliter  melatonin 3 milliGRAM(s) Oral at bedtime PRN Insomnia  ondansetron Injectable 4 milliGRAM(s) IV Push every 8 hours PRN Nausea and/or Vomiting    Vital Signs Last 24 Hrs  T(F): 97.7 (11 May 2024 08:17), Max: 98.5 (10 May 2024 08:38)  HR: 63 (11 May 2024 08:17) (63 - 75)  BP: 122/74 (11 May 2024 08:17) (112/56 - 150/82)  RR: 18 (11 May 2024 08:17) (16 - 20)  SpO2: 96% (11 May 2024 08:17) (96% - 100%)  I&O's Summary    PHYSICAL EXAM:  General: NAD, A/O x 3  ENT: No gross hearing impairment, Moist mucous membranes, no thrush  Neck: Supple, No JVD  Lungs: Clear to auscultation bilaterally, good air entry, non-labored breathing  Cardio: RRR, S1/S2, No murmur  Abdomen: Soft, Nontender, Nondistended; Bowel sounds present  Extremities: No calf tenderness, No cyanosis, No pitting edema  Psych: Appropriate mood and affect    LABS:             11.6   9.41  )-----------( 353      ( 11 May 2024 06:07 )             35.4     05-11  134  |  99  |  22.9  ----------------------------<  226  4.4   |  21.0  |  1.27    Ca    9.1      11 May 2024 06:07    TPro  7.0  /  Alb  4.1  /  TBili  0.5  /  DBili  x   /  AST  14  /  ALT  15  /  AlkPhos  86  05-11          PT/INR - ( 10 May 2024 03:33 )   PT: 12.0 sec;   INR: 1.08 ratio         PTT - ( 09 May 2024 17:34 )  PTT:32.8 sec    05-10 Chol 140 mg/dL LDL -- HDL 44 mg/dL Trig 187 mg/dL    POCT Blood Glucose.: 194 mg/dL (11 May 2024 08:03)  POCT Blood Glucose.: 191 mg/dL (10 May 2024 21:10)  POCT Blood Glucose.: 249 mg/dL (10 May 2024 19:05)  POCT Blood Glucose.: 263 mg/dL (10 May 2024 18:31)  POCT Blood Glucose.: >600 mg/dL (10 May 2024 18:24)  POCT Blood Glucose.: 85 mg/dL (10 May 2024 14:09)  POCT Blood Glucose.: 204 mg/dL (10 May 2024 08:40)    Urinalysis Basic - ( 11 May 2024 06:07 )    Color: x / Appearance: x / SG: x / pH: x  Gluc: 226 mg/dL / Ketone: x  / Bili: x / Urobili: x   Blood: x / Protein: x / Nitrite: x   Leuk Esterase: x / RBC: x / WBC x   Sq Epi: x / Non Sq Epi: x / Bacteria: x    RADIOLOGY & ADDITIONAL TESTS:    Care Discussed with Consultants/Other Providers:    Edd Mckoy MD  [facility]  Contact via Teams or text/call at [phone]    Patient is a 73y old  Female who presents with a chief complaint of RLE cellulitis w/ ulcer (10 May 2024 14:34)    s/p angiogram of RLE    Patient seen and examined at bedside. No overnight events reported.     ALLERGIES:  No Known Allergies    MEDICATIONS  (STANDING):  amLODIPine   Tablet 10 milliGRAM(s) Oral daily  aspirin enteric coated 81 milliGRAM(s) Oral daily  atorvastatin 80 milliGRAM(s) Oral at bedtime  clopidogrel Tablet 75 milliGRAM(s) Oral daily  dextrose 10% Bolus 125 milliLiter(s) IV Bolus once  dextrose 5%. 1000 milliLiter(s) (100 mL/Hr) IV Continuous <Continuous>  dextrose 5%. 1000 milliLiter(s) (50 mL/Hr) IV Continuous <Continuous>  dextrose 50% Injectable 25 Gram(s) IV Push once  dextrose 50% Injectable 12.5 Gram(s) IV Push once  gabapentin 300 milliGRAM(s) Oral at bedtime  glucagon  Injectable 1 milliGRAM(s) IntraMuscular once  heparin   Injectable 5000 Unit(s) SubCutaneous every 12 hours  insulin glargine Injectable (LANTUS) 20 Unit(s) SubCutaneous at bedtime  insulin lispro (ADMELOG) corrective regimen sliding scale   SubCutaneous three times a day before meals  isosorbide   mononitrate ER Tablet (IMDUR) 30 milliGRAM(s) Oral daily  metoprolol succinate  milliGRAM(s) Oral daily  piperacillin/tazobactam IVPB.. 3.375 Gram(s) IV Intermittent every 8 hours  sodium chloride 0.9%. 400 milliLiter(s) (100 mL/Hr) IV Continuous <Continuous>  valsartan 160 milliGRAM(s) Oral daily    MEDICATIONS  (PRN):  acetaminophen     Tablet .. 650 milliGRAM(s) Oral every 6 hours PRN Temp greater or equal to 38C (100.4F), Mild Pain (1 - 3)  aluminum hydroxide/magnesium hydroxide/simethicone Suspension 30 milliLiter(s) Oral every 4 hours PRN Dyspepsia  dextrose Oral Gel 15 Gram(s) Oral once PRN Blood Glucose LESS THAN 70 milliGRAM(s)/deciliter  melatonin 3 milliGRAM(s) Oral at bedtime PRN Insomnia  ondansetron Injectable 4 milliGRAM(s) IV Push every 8 hours PRN Nausea and/or Vomiting    Vital Signs Last 24 Hrs  T(F): 97.7 (11 May 2024 08:17), Max: 98.5 (10 May 2024 08:38)  HR: 63 (11 May 2024 08:17) (63 - 75)  BP: 122/74 (11 May 2024 08:17) (112/56 - 150/82)  RR: 18 (11 May 2024 08:17) (16 - 20)  SpO2: 96% (11 May 2024 08:17) (96% - 100%)  I&O's Summary    PHYSICAL EXAM:  General: NAD, A/O x 3  ENT: No gross hearing impairment, Moist mucous membranes, no thrush  Neck: Supple, No JVD  Lungs: Clear to auscultation bilaterally, good air entry, non-labored breathing  Cardio: RRR, S1/S2, No murmur  Abdomen: Soft, Nontender, Nondistended; Bowel sounds present  Extremities: right anterior shin ulcer with mild erythema surrounding, mild tenderness to palpation   Psych: Appropriate mood and affect    LABS:             11.6   9.41  )-----------( 353      ( 11 May 2024 06:07 )             35.4     05-11  134  |  99  |  22.9  ----------------------------<  226  4.4   |  21.0  |  1.27    Ca    9.1      11 May 2024 06:07    TPro  7.0  /  Alb  4.1  /  TBili  0.5  /  DBili  x   /  AST  14  /  ALT  15  /  AlkPhos  86  05-11          PT/INR - ( 10 May 2024 03:33 )   PT: 12.0 sec;   INR: 1.08 ratio         PTT - ( 09 May 2024 17:34 )  PTT:32.8 sec    05-10 Chol 140 mg/dL LDL -- HDL 44 mg/dL Trig 187 mg/dL    POCT Blood Glucose.: 194 mg/dL (11 May 2024 08:03)  POCT Blood Glucose.: 191 mg/dL (10 May 2024 21:10)  POCT Blood Glucose.: 249 mg/dL (10 May 2024 19:05)  POCT Blood Glucose.: 263 mg/dL (10 May 2024 18:31)  POCT Blood Glucose.: >600 mg/dL (10 May 2024 18:24)  POCT Blood Glucose.: 85 mg/dL (10 May 2024 14:09)  POCT Blood Glucose.: 204 mg/dL (10 May 2024 08:40)    Urinalysis Basic - ( 11 May 2024 06:07 )    Color: x / Appearance: x / SG: x / pH: x  Gluc: 226 mg/dL / Ketone: x  / Bili: x / Urobili: x   Blood: x / Protein: x / Nitrite: x   Leuk Esterase: x / RBC: x / WBC x   Sq Epi: x / Non Sq Epi: x / Bacteria: x    RADIOLOGY & ADDITIONAL TESTS:    Care Discussed with Consultants/Other Providers:

## 2024-05-12 LAB
ANION GAP SERPL CALC-SCNC: 15 MMOL/L — SIGNIFICANT CHANGE UP (ref 5–17)
BUN SERPL-MCNC: 26.2 MG/DL — HIGH (ref 8–20)
CALCIUM SERPL-MCNC: 9.1 MG/DL — SIGNIFICANT CHANGE UP (ref 8.4–10.5)
CHLORIDE SERPL-SCNC: 99 MMOL/L — SIGNIFICANT CHANGE UP (ref 96–108)
CO2 SERPL-SCNC: 20 MMOL/L — LOW (ref 22–29)
CREAT SERPL-MCNC: 1.62 MG/DL — HIGH (ref 0.5–1.3)
EGFR: 34 ML/MIN/1.73M2 — LOW
GLUCOSE BLDC GLUCOMTR-MCNC: 138 MG/DL — HIGH (ref 70–99)
GLUCOSE BLDC GLUCOMTR-MCNC: 207 MG/DL — HIGH (ref 70–99)
GLUCOSE BLDC GLUCOMTR-MCNC: 243 MG/DL — HIGH (ref 70–99)
GLUCOSE BLDC GLUCOMTR-MCNC: 342 MG/DL — HIGH (ref 70–99)
GLUCOSE SERPL-MCNC: 134 MG/DL — HIGH (ref 70–99)
HCT VFR BLD CALC: 30.9 % — LOW (ref 34.5–45)
HGB BLD-MCNC: 10.4 G/DL — LOW (ref 11.5–15.5)
MCHC RBC-ENTMCNC: 28.7 PG — SIGNIFICANT CHANGE UP (ref 27–34)
MCHC RBC-ENTMCNC: 33.7 GM/DL — SIGNIFICANT CHANGE UP (ref 32–36)
MCV RBC AUTO: 85.1 FL — SIGNIFICANT CHANGE UP (ref 80–100)
PLATELET # BLD AUTO: 360 K/UL — SIGNIFICANT CHANGE UP (ref 150–400)
POTASSIUM SERPL-MCNC: 4.9 MMOL/L — SIGNIFICANT CHANGE UP (ref 3.5–5.3)
POTASSIUM SERPL-SCNC: 4.9 MMOL/L — SIGNIFICANT CHANGE UP (ref 3.5–5.3)
RBC # BLD: 3.63 M/UL — LOW (ref 3.8–5.2)
RBC # FLD: 14.1 % — SIGNIFICANT CHANGE UP (ref 10.3–14.5)
SODIUM SERPL-SCNC: 134 MMOL/L — LOW (ref 135–145)
WBC # BLD: 9.6 K/UL — SIGNIFICANT CHANGE UP (ref 3.8–10.5)
WBC # FLD AUTO: 9.6 K/UL — SIGNIFICANT CHANGE UP (ref 3.8–10.5)

## 2024-05-12 PROCEDURE — 99232 SBSQ HOSP IP/OBS MODERATE 35: CPT

## 2024-05-12 RX ADMIN — Medication 81 MILLIGRAM(S): at 14:34

## 2024-05-12 RX ADMIN — PIPERACILLIN AND TAZOBACTAM 25 GRAM(S): 4; .5 INJECTION, POWDER, LYOPHILIZED, FOR SOLUTION INTRAVENOUS at 22:15

## 2024-05-12 RX ADMIN — Medication 650 MILLIGRAM(S): at 20:14

## 2024-05-12 RX ADMIN — PIPERACILLIN AND TAZOBACTAM 25 GRAM(S): 4; .5 INJECTION, POWDER, LYOPHILIZED, FOR SOLUTION INTRAVENOUS at 14:34

## 2024-05-12 RX ADMIN — Medication 8: at 17:53

## 2024-05-12 RX ADMIN — VALSARTAN 160 MILLIGRAM(S): 80 TABLET ORAL at 06:47

## 2024-05-12 RX ADMIN — ATORVASTATIN CALCIUM 80 MILLIGRAM(S): 80 TABLET, FILM COATED ORAL at 22:16

## 2024-05-12 RX ADMIN — HEPARIN SODIUM 5000 UNIT(S): 5000 INJECTION INTRAVENOUS; SUBCUTANEOUS at 17:53

## 2024-05-12 RX ADMIN — Medication 100 MILLIGRAM(S): at 06:26

## 2024-05-12 RX ADMIN — INSULIN GLARGINE 20 UNIT(S): 100 INJECTION, SOLUTION SUBCUTANEOUS at 22:16

## 2024-05-12 RX ADMIN — HEPARIN SODIUM 5000 UNIT(S): 5000 INJECTION INTRAVENOUS; SUBCUTANEOUS at 06:28

## 2024-05-12 RX ADMIN — GABAPENTIN 300 MILLIGRAM(S): 400 CAPSULE ORAL at 22:16

## 2024-05-12 RX ADMIN — ISOSORBIDE MONONITRATE 30 MILLIGRAM(S): 60 TABLET, EXTENDED RELEASE ORAL at 14:34

## 2024-05-12 RX ADMIN — Medication 4: at 13:08

## 2024-05-12 RX ADMIN — PIPERACILLIN AND TAZOBACTAM 25 GRAM(S): 4; .5 INJECTION, POWDER, LYOPHILIZED, FOR SOLUTION INTRAVENOUS at 06:27

## 2024-05-12 RX ADMIN — AMLODIPINE BESYLATE 10 MILLIGRAM(S): 2.5 TABLET ORAL at 06:27

## 2024-05-12 RX ADMIN — CLOPIDOGREL BISULFATE 75 MILLIGRAM(S): 75 TABLET, FILM COATED ORAL at 14:34

## 2024-05-12 NOTE — PROGRESS NOTE ADULT - ASSESSMENT
73 F PMHx CAD s/p stent, CVA, PAD, HB s/p PPM, IDDM, HTN, GERD sent in by PCP for evaluation of RLE cellulitis. Admitted for RLE cellulitis.     RLE cellulitis with ulcer  Peripheral arterial disease   - s/p right LE angiogram by vascular 5/10/24 to improve blood flow and healing  - cont zosyn   - BCx - NGTD  - Follow cultures  - possible d/c tomorrow to home on oral antibiotics    Pseudohyponatremia  - Corrected Na WNL  - Monitor bmp    CAD  - Hx stent   - continue home med  Aspirin 81 mg qd, Clopidogrel 75 mg qd    IDDM   - A1C: 8.8  - Home meds metformin 1000 mg BID held  - DC w/ insulin Lantus 26 U on previous admission 3/4 , patient says she does not take it.   - BG elevated on admission   - Lantus 20 U bedtime   - Sliding scale moderate     HTN  - ordered home meds metoprolol succinate 100mg qd, amlodipine 10 mg qd, valsartan 160 mg qd, isosorbide mononitrate 30 mg qd     PAD  - order home meds atorvastatin 80 mg qd.     DVT ppx: Heparin SQ    Dispo: pending

## 2024-05-12 NOTE — PROGRESS NOTE ADULT - SUBJECTIVE AND OBJECTIVE BOX
Edd Mckoy MD  [facility]  Contact via Teams or text/call at [phone]    Patient is a 73y old  Female who presents with a chief complaint of RLE cellulitis w/ ulcer (11 May 2024 08:33)    Feels good.  Still mild pain in right lower extremity ulcer.     Patient seen and examined at bedside. No overnight events reported.     ALLERGIES:  No Known Allergies    MEDICATIONS  (STANDING):  amLODIPine   Tablet 10 milliGRAM(s) Oral daily  aspirin enteric coated 81 milliGRAM(s) Oral daily  atorvastatin 80 milliGRAM(s) Oral at bedtime  clopidogrel Tablet 75 milliGRAM(s) Oral daily  dextrose 10% Bolus 125 milliLiter(s) IV Bolus once  dextrose 5%. 1000 milliLiter(s) (100 mL/Hr) IV Continuous <Continuous>  dextrose 5%. 1000 milliLiter(s) (50 mL/Hr) IV Continuous <Continuous>  dextrose 50% Injectable 25 Gram(s) IV Push once  dextrose 50% Injectable 12.5 Gram(s) IV Push once  gabapentin 300 milliGRAM(s) Oral at bedtime  glucagon  Injectable 1 milliGRAM(s) IntraMuscular once  heparin   Injectable 5000 Unit(s) SubCutaneous every 12 hours  insulin glargine Injectable (LANTUS) 20 Unit(s) SubCutaneous at bedtime  insulin lispro (ADMELOG) corrective regimen sliding scale   SubCutaneous three times a day before meals  isosorbide   mononitrate ER Tablet (IMDUR) 30 milliGRAM(s) Oral daily  metoprolol succinate  milliGRAM(s) Oral daily  piperacillin/tazobactam IVPB.. 3.375 Gram(s) IV Intermittent every 8 hours  sodium chloride 0.9%. 400 milliLiter(s) (100 mL/Hr) IV Continuous <Continuous>  valsartan 160 milliGRAM(s) Oral daily    MEDICATIONS  (PRN):  acetaminophen     Tablet .. 650 milliGRAM(s) Oral every 6 hours PRN Temp greater or equal to 38C (100.4F), Mild Pain (1 - 3)  aluminum hydroxide/magnesium hydroxide/simethicone Suspension 30 milliLiter(s) Oral every 4 hours PRN Dyspepsia  dextrose Oral Gel 15 Gram(s) Oral once PRN Blood Glucose LESS THAN 70 milliGRAM(s)/deciliter  melatonin 3 milliGRAM(s) Oral at bedtime PRN Insomnia  ondansetron Injectable 4 milliGRAM(s) IV Push every 8 hours PRN Nausea and/or Vomiting    Vital Signs Last 24 Hrs  T(F): 97.1 (12 May 2024 08:19), Max: 97.9 (11 May 2024 21:50)  HR: 73 (12 May 2024 08:19) (65 - 104)  BP: 142/76 (12 May 2024 08:19) (118/72 - 169/83)  RR: 18 (12 May 2024 08:19) (16 - 20)  SpO2: 97% (12 May 2024 08:19) (97% - 99%)  I&O's Summary    11 May 2024 07:01  -  12 May 2024 07:00  --------------------------------------------------------  IN: 990 mL / OUT: 1 mL / NET: 989 mL      PHYSICAL EXAM:  General: NAD, A/O x 3  ENT: No gross hearing impairment, Moist mucous membranes, no thrush  Neck: Supple, No JVD  Lungs: Clear to auscultation bilaterally, good air entry, non-labored breathing  Cardio: RRR, S1/S2, No murmur  Abdomen: Soft, Nontender, Nondistended; Bowel sounds present  Extremities: No calf tenderness, No cyanosis, No pitting edema, right anterior shin ulcer with erythema surrounding  Psych: Appropriate mood and affect    LABS:                        10.4   9.60  )-----------( 360      ( 12 May 2024 04:46 )             30.9     05-12    134  |  99  |  26.2  ----------------------------<  134  4.9   |  20.0  |  1.62    Ca    9.1      12 May 2024 04:46    TPro  7.0  /  Alb  4.1  /  TBili  0.5  /  DBili  x   /  AST  14  /  ALT  15  /  AlkPhos  86  05-11          PT/INR - ( 10 May 2024 03:33 )   PT: 12.0 sec;   INR: 1.08 ratio         PTT - ( 09 May 2024 17:34 )  PTT:32.8 sec    05-10 Chol 140 mg/dL LDL -- HDL 44 mg/dL Trig 187 mg/dL    POCT Blood Glucose.: 138 mg/dL (12 May 2024 08:20)  POCT Blood Glucose.: 111 mg/dL (11 May 2024 23:10)  POCT Blood Glucose.: 220 mg/dL (11 May 2024 17:17)  POCT Blood Glucose.: 231 mg/dL (11 May 2024 12:51)      Urinalysis Basic - ( 12 May 2024 04:46 )    Color: x / Appearance: x / SG: x / pH: x  Gluc: 134 mg/dL / Ketone: x  / Bili: x / Urobili: x   Blood: x / Protein: x / Nitrite: x   Leuk Esterase: x / RBC: x / WBC x   Sq Epi: x / Non Sq Epi: x / Bacteria: x        Culture - Blood (collected 10 May 2024 03:37)  Source: .Blood Blood  Preliminary Report (12 May 2024 10:01):    No growth at 48 Hours    Culture - Blood (collected 10 May 2024 03:33)  Source: .Blood Blood  Preliminary Report (12 May 2024 10:01):    No growth at 48 Hours        RADIOLOGY & ADDITIONAL TESTS:    Care Discussed with Consultants/Other Providers:

## 2024-05-13 LAB
ANION GAP SERPL CALC-SCNC: 13 MMOL/L — SIGNIFICANT CHANGE UP (ref 5–17)
BUN SERPL-MCNC: 21.9 MG/DL — HIGH (ref 8–20)
CALCIUM SERPL-MCNC: 9.1 MG/DL — SIGNIFICANT CHANGE UP (ref 8.4–10.5)
CHLORIDE SERPL-SCNC: 99 MMOL/L — SIGNIFICANT CHANGE UP (ref 96–108)
CO2 SERPL-SCNC: 22 MMOL/L — SIGNIFICANT CHANGE UP (ref 22–29)
CREAT SERPL-MCNC: 1.5 MG/DL — HIGH (ref 0.5–1.3)
EGFR: 37 ML/MIN/1.73M2 — LOW
GLUCOSE BLDC GLUCOMTR-MCNC: 157 MG/DL — HIGH (ref 70–99)
GLUCOSE BLDC GLUCOMTR-MCNC: 179 MG/DL — HIGH (ref 70–99)
GLUCOSE BLDC GLUCOMTR-MCNC: 224 MG/DL — HIGH (ref 70–99)
GLUCOSE BLDC GLUCOMTR-MCNC: 309 MG/DL — HIGH (ref 70–99)
GLUCOSE SERPL-MCNC: 330 MG/DL — HIGH (ref 70–99)
POTASSIUM SERPL-MCNC: 4.8 MMOL/L — SIGNIFICANT CHANGE UP (ref 3.5–5.3)
POTASSIUM SERPL-SCNC: 4.8 MMOL/L — SIGNIFICANT CHANGE UP (ref 3.5–5.3)
SODIUM SERPL-SCNC: 134 MMOL/L — LOW (ref 135–145)

## 2024-05-13 PROCEDURE — 99232 SBSQ HOSP IP/OBS MODERATE 35: CPT

## 2024-05-13 RX ORDER — SODIUM CHLORIDE 9 MG/ML
1000 INJECTION INTRAMUSCULAR; INTRAVENOUS; SUBCUTANEOUS
Refills: 0 | Status: DISCONTINUED | OUTPATIENT
Start: 2024-05-13 | End: 2024-05-14

## 2024-05-13 RX ADMIN — Medication 650 MILLIGRAM(S): at 10:02

## 2024-05-13 RX ADMIN — AMLODIPINE BESYLATE 10 MILLIGRAM(S): 2.5 TABLET ORAL at 06:19

## 2024-05-13 RX ADMIN — Medication 81 MILLIGRAM(S): at 13:12

## 2024-05-13 RX ADMIN — Medication 2: at 17:39

## 2024-05-13 RX ADMIN — INSULIN GLARGINE 20 UNIT(S): 100 INJECTION, SOLUTION SUBCUTANEOUS at 21:35

## 2024-05-13 RX ADMIN — VALSARTAN 160 MILLIGRAM(S): 80 TABLET ORAL at 06:19

## 2024-05-13 RX ADMIN — SODIUM CHLORIDE 75 MILLILITER(S): 9 INJECTION INTRAMUSCULAR; INTRAVENOUS; SUBCUTANEOUS at 21:33

## 2024-05-13 RX ADMIN — ISOSORBIDE MONONITRATE 30 MILLIGRAM(S): 60 TABLET, EXTENDED RELEASE ORAL at 13:12

## 2024-05-13 RX ADMIN — GABAPENTIN 300 MILLIGRAM(S): 400 CAPSULE ORAL at 21:34

## 2024-05-13 RX ADMIN — PIPERACILLIN AND TAZOBACTAM 25 GRAM(S): 4; .5 INJECTION, POWDER, LYOPHILIZED, FOR SOLUTION INTRAVENOUS at 13:18

## 2024-05-13 RX ADMIN — SODIUM CHLORIDE 75 MILLILITER(S): 9 INJECTION INTRAMUSCULAR; INTRAVENOUS; SUBCUTANEOUS at 17:41

## 2024-05-13 RX ADMIN — Medication 8: at 13:13

## 2024-05-13 RX ADMIN — PIPERACILLIN AND TAZOBACTAM 25 GRAM(S): 4; .5 INJECTION, POWDER, LYOPHILIZED, FOR SOLUTION INTRAVENOUS at 06:19

## 2024-05-13 RX ADMIN — HEPARIN SODIUM 5000 UNIT(S): 5000 INJECTION INTRAVENOUS; SUBCUTANEOUS at 17:40

## 2024-05-13 RX ADMIN — ATORVASTATIN CALCIUM 80 MILLIGRAM(S): 80 TABLET, FILM COATED ORAL at 21:34

## 2024-05-13 RX ADMIN — CLOPIDOGREL BISULFATE 75 MILLIGRAM(S): 75 TABLET, FILM COATED ORAL at 13:12

## 2024-05-13 RX ADMIN — Medication 650 MILLIGRAM(S): at 22:30

## 2024-05-13 RX ADMIN — Medication 100 MILLIGRAM(S): at 06:19

## 2024-05-13 RX ADMIN — PIPERACILLIN AND TAZOBACTAM 25 GRAM(S): 4; .5 INJECTION, POWDER, LYOPHILIZED, FOR SOLUTION INTRAVENOUS at 21:35

## 2024-05-13 RX ADMIN — Medication 2: at 09:00

## 2024-05-13 RX ADMIN — Medication 650 MILLIGRAM(S): at 09:02

## 2024-05-13 RX ADMIN — HEPARIN SODIUM 5000 UNIT(S): 5000 INJECTION INTRAVENOUS; SUBCUTANEOUS at 06:19

## 2024-05-13 RX ADMIN — Medication 650 MILLIGRAM(S): at 21:33

## 2024-05-13 NOTE — PROGRESS NOTE ADULT - ASSESSMENT
73 F PMHx CAD s/p stent, CVA, PAD, HB s/p PPM, IDDM, HTN, GERD sent in by PCP for evaluation of RLE cellulitis. Admitted for RLE cellulitis.     RLE cellulitis with ulcer  Peripheral arterial disease   - s/p right LE angiogram by vascular 5/10/24 to improve blood flow and healing  - cont zosyn   - BCx - NGTD  - Blood cultures negative  - possible d/c tomorrow to home on oral antibiotics    Acute Kidney Injury  - Cr 1.62  - Repeat BMP    Pseudohyponatremia  - Corrected Na WNL  - Monitor bmp    CAD  - Hx stent   - continue home med  Aspirin 81 mg qd, Clopidogrel 75 mg qd    IDDM   - A1C: 8.8  - Home meds metformin 1000 mg BID held  - DC w/ insulin Lantus 26 U on previous admission 3/4 , patient says she does not take it.   - BG elevated on admission   - Lantus 20 U bedtime   - Sliding scale moderate     HTN  - ordered home meds metoprolol succinate 100mg qd, amlodipine 10 mg qd, valsartan 160 mg qd, isosorbide mononitrate 30 mg qd     PAD  - Lipitor 80mg nightly     DVT ppx  - Heparin SQ

## 2024-05-13 NOTE — PROGRESS NOTE ADULT - SUBJECTIVE AND OBJECTIVE BOX
Chief complaint: RLE Cellulitis     #[id number]    Patient seen and examined at bedside. No acute overnight events reported. No fever, chills, chest pain or shortness of breath.     Vital Signs Last 24 Hrs  T(F): 97.5 (13 May 2024 09:05), Max: 98.4 (12 May 2024 22:09)  HR: 75 (13 May 2024 09:05) (75 - 78)  BP: 153/81 (13 May 2024 09:05) (110/64 - 153/81)  RR: 18 (13 May 2024 09:05) (18 - 18)  SpO2: 97% (13 May 2024 09:05) (95% - 98%)    Physical Exam:  Constitutional: alert and oriented, in no acute distress   Neck: Soft and supple  Respiratory: Clear to auscultation bilaterally  Cardiovascular: Regular rate and rhyhtm  Gastrointestinal: Soft, non-tender to palpation, +bs  Vascular: 2+ peripheral pulses  Neurological: A/O x 3    Labs:                        10.4   9.60  )-----------( 360      ( 12 May 2024 04:46 )             30.9   05-12    134<L>  |  99  |  26.2<H>  ----------------------------<  134<H>  4.9   |  20.0<L>  |  1.62<H>    Ca    9.1      12 May 2024 04:46

## 2024-05-13 NOTE — PROGRESS NOTE ADULT - REASON FOR ADMISSION
RLE cellulitis w/ ulcer

## 2024-05-14 ENCOUNTER — TRANSCRIPTION ENCOUNTER (OUTPATIENT)
Age: 74
End: 2024-05-14

## 2024-05-14 VITALS
TEMPERATURE: 98 F | SYSTOLIC BLOOD PRESSURE: 134 MMHG | OXYGEN SATURATION: 99 % | DIASTOLIC BLOOD PRESSURE: 69 MMHG | HEART RATE: 71 BPM | RESPIRATION RATE: 18 BRPM

## 2024-05-14 LAB
ANION GAP SERPL CALC-SCNC: 13 MMOL/L — SIGNIFICANT CHANGE UP (ref 5–17)
BASOPHILS # BLD AUTO: 0.06 K/UL — SIGNIFICANT CHANGE UP (ref 0–0.2)
BASOPHILS NFR BLD AUTO: 0.7 % — SIGNIFICANT CHANGE UP (ref 0–2)
BUN SERPL-MCNC: 23.7 MG/DL — HIGH (ref 8–20)
CALCIUM SERPL-MCNC: 9 MG/DL — SIGNIFICANT CHANGE UP (ref 8.4–10.5)
CHLORIDE SERPL-SCNC: 103 MMOL/L — SIGNIFICANT CHANGE UP (ref 96–108)
CO2 SERPL-SCNC: 20 MMOL/L — LOW (ref 22–29)
CREAT SERPL-MCNC: 1.37 MG/DL — HIGH (ref 0.5–1.3)
EGFR: 41 ML/MIN/1.73M2 — LOW
EOSINOPHIL # BLD AUTO: 0.41 K/UL — SIGNIFICANT CHANGE UP (ref 0–0.5)
EOSINOPHIL NFR BLD AUTO: 4.5 % — SIGNIFICANT CHANGE UP (ref 0–6)
GLUCOSE BLDC GLUCOMTR-MCNC: 207 MG/DL — HIGH (ref 70–99)
GLUCOSE SERPL-MCNC: 187 MG/DL — HIGH (ref 70–99)
HCT VFR BLD CALC: 31.8 % — LOW (ref 34.5–45)
HGB BLD-MCNC: 10.4 G/DL — LOW (ref 11.5–15.5)
IMM GRANULOCYTES NFR BLD AUTO: 0.9 % — SIGNIFICANT CHANGE UP (ref 0–0.9)
LYMPHOCYTES # BLD AUTO: 2.99 K/UL — SIGNIFICANT CHANGE UP (ref 1–3.3)
LYMPHOCYTES # BLD AUTO: 33.1 % — SIGNIFICANT CHANGE UP (ref 13–44)
MCHC RBC-ENTMCNC: 28.3 PG — SIGNIFICANT CHANGE UP (ref 27–34)
MCHC RBC-ENTMCNC: 32.7 GM/DL — SIGNIFICANT CHANGE UP (ref 32–36)
MCV RBC AUTO: 86.4 FL — SIGNIFICANT CHANGE UP (ref 80–100)
MONOCYTES # BLD AUTO: 1.04 K/UL — HIGH (ref 0–0.9)
MONOCYTES NFR BLD AUTO: 11.5 % — SIGNIFICANT CHANGE UP (ref 2–14)
NEUTROPHILS # BLD AUTO: 4.45 K/UL — SIGNIFICANT CHANGE UP (ref 1.8–7.4)
NEUTROPHILS NFR BLD AUTO: 49.3 % — SIGNIFICANT CHANGE UP (ref 43–77)
PLATELET # BLD AUTO: 380 K/UL — SIGNIFICANT CHANGE UP (ref 150–400)
POTASSIUM SERPL-MCNC: 4.7 MMOL/L — SIGNIFICANT CHANGE UP (ref 3.5–5.3)
POTASSIUM SERPL-SCNC: 4.7 MMOL/L — SIGNIFICANT CHANGE UP (ref 3.5–5.3)
RBC # BLD: 3.68 M/UL — LOW (ref 3.8–5.2)
RBC # FLD: 14.4 % — SIGNIFICANT CHANGE UP (ref 10.3–14.5)
SODIUM SERPL-SCNC: 136 MMOL/L — SIGNIFICANT CHANGE UP (ref 135–145)
WBC # BLD: 9.03 K/UL — SIGNIFICANT CHANGE UP (ref 3.8–10.5)
WBC # FLD AUTO: 9.03 K/UL — SIGNIFICANT CHANGE UP (ref 3.8–10.5)

## 2024-05-14 PROCEDURE — 37224: CPT

## 2024-05-14 PROCEDURE — 93923 UPR/LXTR ART STDY 3+ LVLS: CPT

## 2024-05-14 PROCEDURE — C1894: CPT

## 2024-05-14 PROCEDURE — 87040 BLOOD CULTURE FOR BACTERIA: CPT

## 2024-05-14 PROCEDURE — 85025 COMPLETE CBC W/AUTO DIFF WBC: CPT

## 2024-05-14 PROCEDURE — 80061 LIPID PANEL: CPT

## 2024-05-14 PROCEDURE — 37228: CPT

## 2024-05-14 PROCEDURE — C1769: CPT

## 2024-05-14 PROCEDURE — 99239 HOSP IP/OBS DSCHRG MGMT >30: CPT

## 2024-05-14 PROCEDURE — 73590 X-RAY EXAM OF LOWER LEG: CPT

## 2024-05-14 PROCEDURE — 99285 EMERGENCY DEPT VISIT HI MDM: CPT

## 2024-05-14 PROCEDURE — 93005 ELECTROCARDIOGRAM TRACING: CPT

## 2024-05-14 PROCEDURE — C1887: CPT

## 2024-05-14 PROCEDURE — 85730 THROMBOPLASTIN TIME PARTIAL: CPT

## 2024-05-14 PROCEDURE — 83036 HEMOGLOBIN GLYCOSYLATED A1C: CPT

## 2024-05-14 PROCEDURE — 85610 PROTHROMBIN TIME: CPT

## 2024-05-14 PROCEDURE — 82962 GLUCOSE BLOOD TEST: CPT

## 2024-05-14 PROCEDURE — C1760: CPT

## 2024-05-14 PROCEDURE — 75716 ARTERY X-RAYS ARMS/LEGS: CPT | Mod: XU

## 2024-05-14 PROCEDURE — 85027 COMPLETE CBC AUTOMATED: CPT

## 2024-05-14 PROCEDURE — 87641 MR-STAPH DNA AMP PROBE: CPT

## 2024-05-14 PROCEDURE — T1013: CPT

## 2024-05-14 PROCEDURE — 80053 COMPREHEN METABOLIC PANEL: CPT

## 2024-05-14 PROCEDURE — 80048 BASIC METABOLIC PNL TOTAL CA: CPT

## 2024-05-14 PROCEDURE — C1725: CPT

## 2024-05-14 PROCEDURE — 36415 COLL VENOUS BLD VENIPUNCTURE: CPT

## 2024-05-14 PROCEDURE — 96374 THER/PROPH/DIAG INJ IV PUSH: CPT

## 2024-05-14 PROCEDURE — 87640 STAPH A DNA AMP PROBE: CPT

## 2024-05-14 RX ORDER — CEPHALEXIN 500 MG
500 CAPSULE ORAL
Refills: 0 | Status: DISCONTINUED | OUTPATIENT
Start: 2024-05-14 | End: 2024-05-14

## 2024-05-14 RX ORDER — CEPHALEXIN 500 MG
1 CAPSULE ORAL
Qty: 8 | Refills: 0
Start: 2024-05-14 | End: 2024-05-15

## 2024-05-14 RX ADMIN — PIPERACILLIN AND TAZOBACTAM 25 GRAM(S): 4; .5 INJECTION, POWDER, LYOPHILIZED, FOR SOLUTION INTRAVENOUS at 06:21

## 2024-05-14 RX ADMIN — Medication 100 MILLIGRAM(S): at 06:22

## 2024-05-14 RX ADMIN — AMLODIPINE BESYLATE 10 MILLIGRAM(S): 2.5 TABLET ORAL at 06:21

## 2024-05-14 RX ADMIN — Medication 650 MILLIGRAM(S): at 12:01

## 2024-05-14 RX ADMIN — Medication 81 MILLIGRAM(S): at 08:24

## 2024-05-14 RX ADMIN — Medication 500 MILLIGRAM(S): at 12:02

## 2024-05-14 RX ADMIN — ISOSORBIDE MONONITRATE 30 MILLIGRAM(S): 60 TABLET, EXTENDED RELEASE ORAL at 08:25

## 2024-05-14 RX ADMIN — HEPARIN SODIUM 5000 UNIT(S): 5000 INJECTION INTRAVENOUS; SUBCUTANEOUS at 06:21

## 2024-05-14 RX ADMIN — Medication 4: at 08:46

## 2024-05-14 RX ADMIN — VALSARTAN 160 MILLIGRAM(S): 80 TABLET ORAL at 06:21

## 2024-05-14 RX ADMIN — CLOPIDOGREL BISULFATE 75 MILLIGRAM(S): 75 TABLET, FILM COATED ORAL at 08:24

## 2024-05-14 NOTE — DISCHARGE NOTE PROVIDER - HOSPITAL COURSE
72 y/o F with PMH of CAD s/p PCI, CVA, PAD, heart block s/p PPM, DM, HTN, GERD admitted for right lower extremity cellulitis. Vascular surgery was consulted and the right lower extremity angiogram was performed on 5/10 to improve blood flow and healing. Blood cultures were negative. Renal function improved with IVF. The patient is hemodynamically stable for discharge with appropriate follow up.

## 2024-05-14 NOTE — DISCHARGE NOTE PROVIDER - PROVIDER TOKENS
PROVIDER:[TOKEN:[[id number]],FOLLOWUP:[2 weeks]],PROVIDER:[TOKEN:[[id number]],FOLLOWUP:[2 weeks]]

## 2024-05-14 NOTE — DISCHARGE NOTE NURSING/CASE MANAGEMENT/SOCIAL WORK - NSTRANSFERBELONGINGSDISPO_GEN_A_NUR
12/20/2017    Pt has not attended PT sessions since 9/21/2017 and will be discharged with goal status unknown.    
given to family

## 2024-05-14 NOTE — DISCHARGE NOTE PROVIDER - ATTENDING DISCHARGE PHYSICAL EXAMINATION:
Vital Signs Last 24 Hrs  T(F): 97.8 (14 May 2024 05:19), Max: 97.9 (13 May 2024 18:50)  HR: 74 (14 May 2024 05:19) (74 - 79)  BP: 109/65 (14 May 2024 05:19) (109/65 - 158/67)  RR: 18 (14 May 2024 05:19) (18 - 18)  SpO2: 98% (14 May 2024 05:19) (97% - 99%)    Physical Exam:  Constitutional: alert and oriented, in no acute distress   Neck: Soft and supple  Respiratory: Clear to auscultation bilaterally, no wheezes or crackles  Cardiovascular: Regular rate and rhythm, no murmurs, gallops, rubs  Gastrointestinal: Soft, non-tender to palpation, +bs  Vascular: 2+ peripheral pulses  Neurological: A/O x 3, no focal neurological deficits  Musculoskeletal: 5/5 strength b/l upper and lower extremities, no lower extremity edema bilaterally

## 2024-05-14 NOTE — DISCHARGE NOTE NURSING/CASE MANAGEMENT/SOCIAL WORK - NSDCPEFALRISK_GEN_ALL_CORE
For information on Fall & Injury Prevention, visit: [email or web address] OR  [email or web address] OR  https://www.cdc.gov/steadi/patient.html

## 2024-05-14 NOTE — DISCHARGE NOTE PROVIDER - CARE PROVIDER_API CALL
Adelita Jara  Endocrinology/Metab/Diabetes  [address] [place] [address]  Phone: [phone]  Fax: [phone]  Follow Up Time: 2 weeks    Annita Santos  Surgery  [address] [place] [address], [address]  [place] [address]  Phone: [phone]  Fax: [phone]  Follow Up Time: 2 weeks

## 2024-05-14 NOTE — DISCHARGE NOTE PROVIDER - NSDCFUSCHEDAPPT_GEN_ALL_CORE_FT
[facility]  CARDIOLOGY [address]  Scheduled Appointment: 05/30/2024    [facility]  ELECTROPH [address]  Scheduled Appointment: 06/25/2024    Travis Allen  [facility]  CARDIOLOGY [address]  Scheduled Appointment: 07/01/2024    Cristopher Montoya  [facility]  PULMMED [address]  Scheduled Appointment: 07/05/2024

## 2024-05-14 NOTE — DISCHARGE NOTE NURSING/CASE MANAGEMENT/SOCIAL WORK - PATIENT PORTAL LINK FT
You can access the FollowMyHealth Patient Portal offered by [facility] by registering at the following website: [email or web address]. By joining [employer]’s FollowMyHealth portal, you will also be able to view your health information using other applications (apps) compatible with our system.

## 2024-05-14 NOTE — DISCHARGE NOTE PROVIDER - NSDCMRMEDTOKEN_GEN_ALL_CORE_FT
amLODIPine 10 mg oral tablet: 1 tab(s) orally once a day  aspirin 81 mg oral delayed release tablet: 1 tab(s) orally once a day  atorvastatin 80 mg oral tablet: 1 tab(s) orally once a day (at bedtime)  cephalexin 500 mg oral capsule: 1 cap(s) orally 4 times a day  clopidogrel 75 mg oral tablet: 1 tab(s) orally once a day  gabapentin 300 mg oral capsule: 1 cap(s) orally once a day (at bedtime)  isosorbide mononitrate 30 mg oral tablet, extended release: 1 tab(s) orally once a day  metFORMIN 1000 mg oral tablet: 1 tab(s) orally 2 times a day (with meals)  metoprolol succinate 100 mg oral tablet, extended release: 1 tab(s) orally once a day  valsartan 160 mg oral tablet: 1 tab(s) orally once a day

## 2024-05-14 NOTE — DISCHARGE NOTE PROVIDER - NSDCCPCAREPLAN_GEN_ALL_CORE_FT
PRINCIPAL DISCHARGE DIAGNOSIS  Diagnosis: Cellulitis  Assessment and Plan of Treatment: - Please complete antibiotics course as prescribed      SECONDARY DISCHARGE DIAGNOSES  Diagnosis: PAD (peripheral artery disease)  Assessment and Plan of Treatment: - s/p angiogram   - Follow up with Vascular sugery as an outpatient    Diagnosis: Diabetes  Assessment and Plan of Treatment: - Endocrine follow up outpatient    Diagnosis: BRADY (acute kidney injury)  Assessment and Plan of Treatment: - Follow up outpatient for repeat BMP

## 2024-05-24 ENCOUNTER — APPOINTMENT (OUTPATIENT)
Dept: CARDIOLOGY | Facility: CLINIC | Age: 74
End: 2024-05-24

## 2024-05-30 ENCOUNTER — APPOINTMENT (OUTPATIENT)
Dept: CARDIOLOGY | Facility: CLINIC | Age: 74
End: 2024-05-30
Payer: MEDICARE

## 2024-05-31 ENCOUNTER — APPOINTMENT (OUTPATIENT)
Dept: CARDIOLOGY | Facility: CLINIC | Age: 74
End: 2024-05-31
Payer: MEDICARE

## 2024-05-31 PROCEDURE — 78452 HT MUSCLE IMAGE SPECT MULT: CPT

## 2024-05-31 PROCEDURE — 93015 CV STRESS TEST SUPVJ I&R: CPT

## 2024-05-31 PROCEDURE — A9502: CPT

## 2024-06-06 ENCOUNTER — NON-APPOINTMENT (OUTPATIENT)
Age: 74
End: 2024-06-06

## 2024-06-07 ENCOUNTER — APPOINTMENT (OUTPATIENT)
Dept: CARDIOLOGY | Facility: CLINIC | Age: 74
End: 2024-06-07

## 2024-06-10 ENCOUNTER — APPOINTMENT (OUTPATIENT)
Dept: VASCULAR SURGERY | Facility: CLINIC | Age: 74
End: 2024-06-10
Payer: MEDICARE

## 2024-06-10 VITALS
DIASTOLIC BLOOD PRESSURE: 68 MMHG | BODY MASS INDEX: 30.44 KG/M2 | OXYGEN SATURATION: 98 % | HEART RATE: 75 BPM | TEMPERATURE: 98.5 F | RESPIRATION RATE: 16 BRPM | WEIGHT: 151 LBS | HEIGHT: 59 IN | SYSTOLIC BLOOD PRESSURE: 128 MMHG

## 2024-06-10 DIAGNOSIS — I73.9 PERIPHERAL VASCULAR DISEASE, UNSPECIFIED: ICD-10-CM

## 2024-06-10 PROCEDURE — 93926 LOWER EXTREMITY STUDY: CPT | Mod: RT

## 2024-06-10 PROCEDURE — 99024 POSTOP FOLLOW-UP VISIT: CPT

## 2024-06-13 NOTE — PHYSICAL EXAM
[Respiratory Effort] : normal respiratory effort [Normal Rate and Rhythm] : normal rate and rhythm [Alert] : alert [Oriented to Person] : oriented to person [Oriented to Place] : oriented to place [Oriented to Time] : oriented to time [Calm] : calm [de-identified] : no acute distress noted, [de-identified] : normocephalic, [FreeTextEntry1] : DP and peroneal signal present, 3cm non healing ulceration to anterior shin on right

## 2024-06-13 NOTE — REASON FOR VISIT
[de-identified] : 74 YO F with DM presents for post op visit. She had an accident one year ago when a small amount of hot water splashed onto anterior right calf. The wound then slowly increased in size. She presented to the hospital and underwent RLE angiogram with SFA, TPT and peroneal angioplasty. She states the wound has not improved at all and is very painful still. Taking aspirin, plavix, and statin.

## 2024-06-13 NOTE — DISCUSSION/SUMMARY
[FreeTextEntry1] : 72 YO F with DM with non healing right anterior shin ulcer s/p RLE angiogram with SFA, TPT and peroneal  angioplasty. Wound is the same.  - will repeat RLE angiogram with attempted AT recannalization - I have also spoken to plastic surgery regarding debridement of her wound

## 2024-06-18 ENCOUNTER — OUTPATIENT (OUTPATIENT)
Dept: OUTPATIENT SERVICES | Facility: HOSPITAL | Age: 74
LOS: 1 days | End: 2024-06-18
Payer: MEDICARE

## 2024-06-18 ENCOUNTER — INPATIENT (INPATIENT)
Facility: HOSPITAL | Age: 74
LOS: 0 days | Discharge: HOME | DRG: 641 | End: 2024-06-19
Attending: STUDENT IN AN ORGANIZED HEALTH CARE EDUCATION/TRAINING PROGRAM | Admitting: HOSPITALIST
Payer: MEDICARE

## 2024-06-18 VITALS
OXYGEN SATURATION: 97 % | TEMPERATURE: 97 F | WEIGHT: 147.71 LBS | HEART RATE: 73 BPM | DIASTOLIC BLOOD PRESSURE: 70 MMHG | SYSTOLIC BLOOD PRESSURE: 115 MMHG | HEIGHT: 61 IN | RESPIRATION RATE: 18 BRPM

## 2024-06-18 VITALS
WEIGHT: 153.66 LBS | TEMPERATURE: 98 F | SYSTOLIC BLOOD PRESSURE: 138 MMHG | OXYGEN SATURATION: 100 % | HEART RATE: 77 BPM | DIASTOLIC BLOOD PRESSURE: 79 MMHG | RESPIRATION RATE: 16 BRPM | HEIGHT: 61 IN

## 2024-06-18 DIAGNOSIS — Z86.011 PERSONAL HISTORY OF BENIGN NEOPLASM OF THE BRAIN: Chronic | ICD-10-CM

## 2024-06-18 DIAGNOSIS — Z95.0 PRESENCE OF CARDIAC PACEMAKER: Chronic | ICD-10-CM

## 2024-06-18 DIAGNOSIS — Z29.9 ENCOUNTER FOR PROPHYLACTIC MEASURES, UNSPECIFIED: ICD-10-CM

## 2024-06-18 DIAGNOSIS — I73.9 PERIPHERAL VASCULAR DISEASE, UNSPECIFIED: ICD-10-CM

## 2024-06-18 DIAGNOSIS — H26.9 UNSPECIFIED CATARACT: Chronic | ICD-10-CM

## 2024-06-18 DIAGNOSIS — E87.5 HYPERKALEMIA: ICD-10-CM

## 2024-06-18 DIAGNOSIS — Z98.890 OTHER SPECIFIED POSTPROCEDURAL STATES: Chronic | ICD-10-CM

## 2024-06-18 DIAGNOSIS — I10 ESSENTIAL (PRIMARY) HYPERTENSION: ICD-10-CM

## 2024-06-18 DIAGNOSIS — E11.9 TYPE 2 DIABETES MELLITUS WITHOUT COMPLICATIONS: ICD-10-CM

## 2024-06-18 DIAGNOSIS — Z01.818 ENCOUNTER FOR OTHER PREPROCEDURAL EXAMINATION: ICD-10-CM

## 2024-06-18 DIAGNOSIS — Z95.0 PRESENCE OF CARDIAC PACEMAKER: ICD-10-CM

## 2024-06-18 LAB
A1C WITH ESTIMATED AVERAGE GLUCOSE RESULT: 8.4 % — HIGH (ref 4–5.6)
ALBUMIN SERPL ELPH-MCNC: 4.4 G/DL — SIGNIFICANT CHANGE UP (ref 3.3–5.2)
ALBUMIN SERPL ELPH-MCNC: 4.8 G/DL — SIGNIFICANT CHANGE UP (ref 3.3–5.2)
ALP SERPL-CCNC: 73 U/L — SIGNIFICANT CHANGE UP (ref 40–120)
ALP SERPL-CCNC: 93 U/L — SIGNIFICANT CHANGE UP (ref 40–120)
ALT FLD-CCNC: 18 U/L — SIGNIFICANT CHANGE UP
ALT FLD-CCNC: 19 U/L — SIGNIFICANT CHANGE UP
ANION GAP SERPL CALC-SCNC: 14 MMOL/L — SIGNIFICANT CHANGE UP (ref 5–17)
ANION GAP SERPL CALC-SCNC: 14 MMOL/L — SIGNIFICANT CHANGE UP (ref 5–17)
ANION GAP SERPL CALC-SCNC: 15 MMOL/L — SIGNIFICANT CHANGE UP (ref 5–17)
APTT BLD: 28.8 SEC — SIGNIFICANT CHANGE UP (ref 24.5–35.6)
AST SERPL-CCNC: 16 U/L — SIGNIFICANT CHANGE UP
AST SERPL-CCNC: 16 U/L — SIGNIFICANT CHANGE UP
BASOPHILS # BLD AUTO: 0.04 K/UL — SIGNIFICANT CHANGE UP (ref 0–0.2)
BASOPHILS # BLD AUTO: 0.06 K/UL — SIGNIFICANT CHANGE UP (ref 0–0.2)
BASOPHILS NFR BLD AUTO: 0.5 % — SIGNIFICANT CHANGE UP (ref 0–2)
BASOPHILS NFR BLD AUTO: 0.8 % — SIGNIFICANT CHANGE UP (ref 0–2)
BILIRUB SERPL-MCNC: 0.4 MG/DL — SIGNIFICANT CHANGE UP (ref 0.4–2)
BILIRUB SERPL-MCNC: 0.4 MG/DL — SIGNIFICANT CHANGE UP (ref 0.4–2)
BLD GP AB SCN SERPL QL: SIGNIFICANT CHANGE UP
BUN SERPL-MCNC: 25.1 MG/DL — HIGH (ref 8–20)
BUN SERPL-MCNC: 25.8 MG/DL — HIGH (ref 8–20)
BUN SERPL-MCNC: 27.3 MG/DL — HIGH (ref 8–20)
CALCIUM SERPL-MCNC: 10.4 MG/DL — SIGNIFICANT CHANGE UP (ref 8.4–10.5)
CALCIUM SERPL-MCNC: 9.3 MG/DL — SIGNIFICANT CHANGE UP (ref 8.4–10.5)
CALCIUM SERPL-MCNC: 9.6 MG/DL — SIGNIFICANT CHANGE UP (ref 8.4–10.5)
CHLORIDE SERPL-SCNC: 100 MMOL/L — SIGNIFICANT CHANGE UP (ref 96–108)
CHLORIDE SERPL-SCNC: 100 MMOL/L — SIGNIFICANT CHANGE UP (ref 96–108)
CHLORIDE SERPL-SCNC: 101 MMOL/L — SIGNIFICANT CHANGE UP (ref 96–108)
CO2 SERPL-SCNC: 17 MMOL/L — LOW (ref 22–29)
CO2 SERPL-SCNC: 19 MMOL/L — LOW (ref 22–29)
CO2 SERPL-SCNC: 24 MMOL/L — SIGNIFICANT CHANGE UP (ref 22–29)
CREAT SERPL-MCNC: 1 MG/DL — SIGNIFICANT CHANGE UP (ref 0.5–1.3)
CREAT SERPL-MCNC: 1.02 MG/DL — SIGNIFICANT CHANGE UP (ref 0.5–1.3)
CREAT SERPL-MCNC: 1.05 MG/DL — SIGNIFICANT CHANGE UP (ref 0.5–1.3)
EGFR: 57 ML/MIN/1.73M2 — LOW
EGFR: 59 ML/MIN/1.73M2 — LOW
EGFR: 60 ML/MIN/1.73M2 — SIGNIFICANT CHANGE UP
EOSINOPHIL # BLD AUTO: 0.22 K/UL — SIGNIFICANT CHANGE UP (ref 0–0.5)
EOSINOPHIL # BLD AUTO: 0.25 K/UL — SIGNIFICANT CHANGE UP (ref 0–0.5)
EOSINOPHIL NFR BLD AUTO: 2.8 % — SIGNIFICANT CHANGE UP (ref 0–6)
EOSINOPHIL NFR BLD AUTO: 2.9 % — SIGNIFICANT CHANGE UP (ref 0–6)
ESTIMATED AVERAGE GLUCOSE: 194 MG/DL — HIGH (ref 68–114)
GLUCOSE SERPL-MCNC: 166 MG/DL — HIGH (ref 70–99)
GLUCOSE SERPL-MCNC: 192 MG/DL — HIGH (ref 70–99)
GLUCOSE SERPL-MCNC: 89 MG/DL — SIGNIFICANT CHANGE UP (ref 70–99)
HCT VFR BLD CALC: 31.7 % — LOW (ref 34.5–45)
HCT VFR BLD CALC: 35 % — SIGNIFICANT CHANGE UP (ref 34.5–45)
HGB BLD-MCNC: 10.7 G/DL — LOW (ref 11.5–15.5)
HGB BLD-MCNC: 11.4 G/DL — LOW (ref 11.5–15.5)
IMM GRANULOCYTES NFR BLD AUTO: 0.4 % — SIGNIFICANT CHANGE UP (ref 0–0.9)
IMM GRANULOCYTES NFR BLD AUTO: 0.6 % — SIGNIFICANT CHANGE UP (ref 0–0.9)
INR BLD: 1 RATIO — SIGNIFICANT CHANGE UP (ref 0.85–1.18)
LYMPHOCYTES # BLD AUTO: 2.12 K/UL — SIGNIFICANT CHANGE UP (ref 1–3.3)
LYMPHOCYTES # BLD AUTO: 2.85 K/UL — SIGNIFICANT CHANGE UP (ref 1–3.3)
LYMPHOCYTES # BLD AUTO: 28.1 % — SIGNIFICANT CHANGE UP (ref 13–44)
LYMPHOCYTES # BLD AUTO: 32.5 % — SIGNIFICANT CHANGE UP (ref 13–44)
MCHC RBC-ENTMCNC: 28.3 PG — SIGNIFICANT CHANGE UP (ref 27–34)
MCHC RBC-ENTMCNC: 28.7 PG — SIGNIFICANT CHANGE UP (ref 27–34)
MCHC RBC-ENTMCNC: 32.6 GM/DL — SIGNIFICANT CHANGE UP (ref 32–36)
MCHC RBC-ENTMCNC: 33.8 GM/DL — SIGNIFICANT CHANGE UP (ref 32–36)
MCV RBC AUTO: 85 FL — SIGNIFICANT CHANGE UP (ref 80–100)
MCV RBC AUTO: 86.8 FL — SIGNIFICANT CHANGE UP (ref 80–100)
MONOCYTES # BLD AUTO: 0.58 K/UL — SIGNIFICANT CHANGE UP (ref 0–0.9)
MONOCYTES # BLD AUTO: 0.91 K/UL — HIGH (ref 0–0.9)
MONOCYTES NFR BLD AUTO: 10.4 % — SIGNIFICANT CHANGE UP (ref 2–14)
MONOCYTES NFR BLD AUTO: 7.7 % — SIGNIFICANT CHANGE UP (ref 2–14)
NEUTROPHILS # BLD AUTO: 4.54 K/UL — SIGNIFICANT CHANGE UP (ref 1.8–7.4)
NEUTROPHILS # BLD AUTO: 4.68 K/UL — SIGNIFICANT CHANGE UP (ref 1.8–7.4)
NEUTROPHILS NFR BLD AUTO: 53.2 % — SIGNIFICANT CHANGE UP (ref 43–77)
NEUTROPHILS NFR BLD AUTO: 60.1 % — SIGNIFICANT CHANGE UP (ref 43–77)
PLATELET # BLD AUTO: 373 K/UL — SIGNIFICANT CHANGE UP (ref 150–400)
PLATELET # BLD AUTO: 395 K/UL — SIGNIFICANT CHANGE UP (ref 150–400)
POTASSIUM SERPL-MCNC: 4.7 MMOL/L — SIGNIFICANT CHANGE UP (ref 3.5–5.3)
POTASSIUM SERPL-MCNC: 6.7 MMOL/L — CRITICAL HIGH (ref 3.5–5.3)
POTASSIUM SERPL-MCNC: 7.1 MMOL/L — CRITICAL HIGH (ref 3.5–5.3)
POTASSIUM SERPL-SCNC: 4.7 MMOL/L — SIGNIFICANT CHANGE UP (ref 3.5–5.3)
POTASSIUM SERPL-SCNC: 6.7 MMOL/L — CRITICAL HIGH (ref 3.5–5.3)
POTASSIUM SERPL-SCNC: 7.1 MMOL/L — CRITICAL HIGH (ref 3.5–5.3)
PROT SERPL-MCNC: 7.5 G/DL — SIGNIFICANT CHANGE UP (ref 6.6–8.7)
PROT SERPL-MCNC: 8.4 G/DL — SIGNIFICANT CHANGE UP (ref 6.6–8.7)
PROTHROM AB SERPL-ACNC: 11.1 SEC — SIGNIFICANT CHANGE UP (ref 9.5–13)
RBC # BLD: 3.73 M/UL — LOW (ref 3.8–5.2)
RBC # BLD: 4.03 M/UL — SIGNIFICANT CHANGE UP (ref 3.8–5.2)
RBC # FLD: 14.8 % — HIGH (ref 10.3–14.5)
RBC # FLD: 14.9 % — HIGH (ref 10.3–14.5)
SODIUM SERPL-SCNC: 132 MMOL/L — LOW (ref 135–145)
SODIUM SERPL-SCNC: 133 MMOL/L — LOW (ref 135–145)
SODIUM SERPL-SCNC: 139 MMOL/L — SIGNIFICANT CHANGE UP (ref 135–145)
WBC # BLD: 7.55 K/UL — SIGNIFICANT CHANGE UP (ref 3.8–10.5)
WBC # BLD: 8.78 K/UL — SIGNIFICANT CHANGE UP (ref 3.8–10.5)
WBC # FLD AUTO: 7.55 K/UL — SIGNIFICANT CHANGE UP (ref 3.8–10.5)
WBC # FLD AUTO: 8.78 K/UL — SIGNIFICANT CHANGE UP (ref 3.8–10.5)

## 2024-06-18 PROCEDURE — G0463: CPT

## 2024-06-18 PROCEDURE — 93010 ELECTROCARDIOGRAM REPORT: CPT | Mod: 76

## 2024-06-18 PROCEDURE — 85025 COMPLETE CBC W/AUTO DIFF WBC: CPT

## 2024-06-18 PROCEDURE — 99223 1ST HOSP IP/OBS HIGH 75: CPT

## 2024-06-18 PROCEDURE — 85730 THROMBOPLASTIN TIME PARTIAL: CPT

## 2024-06-18 PROCEDURE — 93010 ELECTROCARDIOGRAM REPORT: CPT

## 2024-06-18 PROCEDURE — 99285 EMERGENCY DEPT VISIT HI MDM: CPT

## 2024-06-18 PROCEDURE — 83036 HEMOGLOBIN GLYCOSYLATED A1C: CPT

## 2024-06-18 PROCEDURE — 85610 PROTHROMBIN TIME: CPT

## 2024-06-18 PROCEDURE — 36415 COLL VENOUS BLD VENIPUNCTURE: CPT

## 2024-06-18 PROCEDURE — 93005 ELECTROCARDIOGRAM TRACING: CPT

## 2024-06-18 PROCEDURE — 80053 COMPREHEN METABOLIC PANEL: CPT

## 2024-06-18 PROCEDURE — 86850 RBC ANTIBODY SCREEN: CPT

## 2024-06-18 PROCEDURE — 86901 BLOOD TYPING SEROLOGIC RH(D): CPT

## 2024-06-18 PROCEDURE — 86900 BLOOD TYPING SEROLOGIC ABO: CPT

## 2024-06-18 RX ORDER — CALCIUM GLUCONATE 98 MG/ML
1 INJECTION, SOLUTION INTRAVENOUS ONCE
Refills: 0 | Status: COMPLETED | OUTPATIENT
Start: 2024-06-18 | End: 2024-06-18

## 2024-06-18 RX ORDER — CEFAZOLIN SODIUM IN 0.9 % NACL 3 G/100 ML
2000 INTRAVENOUS SOLUTION, PIGGYBACK (ML) INTRAVENOUS ONCE
Refills: 0 | Status: DISCONTINUED | OUTPATIENT
Start: 2024-07-09 | End: 2024-07-09

## 2024-06-18 RX ORDER — INSULIN REGULAR, HUMAN 100/ML
10 VIAL (ML) INJECTION ONCE
Refills: 0 | Status: DISCONTINUED | OUTPATIENT
Start: 2024-06-18 | End: 2024-06-18

## 2024-06-18 RX ORDER — ACETAMINOPHEN 325 MG
650 TABLET ORAL ONCE
Refills: 0 | Status: COMPLETED | OUTPATIENT
Start: 2024-06-18 | End: 2024-06-18

## 2024-06-18 RX ORDER — INSULIN REGULAR, HUMAN 100/ML
10 VIAL (ML) INJECTION ONCE
Refills: 0 | Status: COMPLETED | OUTPATIENT
Start: 2024-06-18 | End: 2024-06-18

## 2024-06-18 RX ORDER — DEXTROSE 30 % IN WATER 30 %
50 VIAL (ML) INTRAVENOUS ONCE
Refills: 0 | Status: COMPLETED | OUTPATIENT
Start: 2024-06-18 | End: 2024-06-18

## 2024-06-18 RX ORDER — ACETAMINOPHEN 500 MG/5ML
975 LIQUID (ML) ORAL ONCE
Refills: 0 | Status: COMPLETED | OUTPATIENT
Start: 2024-07-09 | End: 2024-07-09

## 2024-06-18 RX ORDER — SODIUM BICARBONATE 650 MG/1
50 TABLET ORAL ONCE
Refills: 0 | Status: COMPLETED | OUTPATIENT
Start: 2024-06-18 | End: 2024-06-18

## 2024-06-18 RX ORDER — SODIUM ZIRCONIUM CYCLOSILICATE 10 G/10G
5 POWDER, FOR SUSPENSION ORAL ONCE
Refills: 0 | Status: COMPLETED | OUTPATIENT
Start: 2024-06-18 | End: 2024-06-18

## 2024-06-18 RX ADMIN — SODIUM ZIRCONIUM CYCLOSILICATE 5 GRAM(S): 10 POWDER, FOR SUSPENSION ORAL at 21:52

## 2024-06-18 RX ADMIN — CALCIUM GLUCONATE 100 GRAM(S): 98 INJECTION, SOLUTION INTRAVENOUS at 21:53

## 2024-06-18 RX ADMIN — SODIUM BICARBONATE 50 MILLIEQUIVALENT(S): 650 TABLET ORAL at 21:53

## 2024-06-18 RX ADMIN — Medication 50 MILLILITER(S): at 21:52

## 2024-06-18 RX ADMIN — Medication 650 MILLIGRAM(S): at 21:51

## 2024-06-18 RX ADMIN — Medication 10 UNIT(S): at 22:02

## 2024-06-19 ENCOUNTER — TRANSCRIPTION ENCOUNTER (OUTPATIENT)
Age: 74
End: 2024-06-19

## 2024-06-19 VITALS
SYSTOLIC BLOOD PRESSURE: 134 MMHG | TEMPERATURE: 98 F | OXYGEN SATURATION: 100 % | HEART RATE: 70 BPM | RESPIRATION RATE: 18 BRPM | DIASTOLIC BLOOD PRESSURE: 74 MMHG

## 2024-06-19 LAB
ANION GAP SERPL CALC-SCNC: 17 MMOL/L — SIGNIFICANT CHANGE UP (ref 5–17)
BUN SERPL-MCNC: 25.4 MG/DL — HIGH (ref 8–20)
CALCIUM SERPL-MCNC: 9.9 MG/DL — SIGNIFICANT CHANGE UP (ref 8.4–10.5)
CHLORIDE SERPL-SCNC: 100 MMOL/L — SIGNIFICANT CHANGE UP (ref 96–108)
CO2 SERPL-SCNC: 20 MMOL/L — LOW (ref 22–29)
CREAT SERPL-MCNC: 1.05 MG/DL — SIGNIFICANT CHANGE UP (ref 0.5–1.3)
EGFR: 57 ML/MIN/1.73M2 — LOW
GLUCOSE BLDC GLUCOMTR-MCNC: 204 MG/DL — HIGH (ref 70–99)
GLUCOSE BLDC GLUCOMTR-MCNC: 344 MG/DL — HIGH (ref 70–99)
GLUCOSE SERPL-MCNC: 188 MG/DL — HIGH (ref 70–99)
HCT VFR BLD CALC: 34.2 % — LOW (ref 34.5–45)
HGB BLD-MCNC: 11.7 G/DL — SIGNIFICANT CHANGE UP (ref 11.5–15.5)
MAGNESIUM SERPL-MCNC: 2.1 MG/DL — SIGNIFICANT CHANGE UP (ref 1.6–2.6)
MCHC RBC-ENTMCNC: 28.5 PG — SIGNIFICANT CHANGE UP (ref 27–34)
MCHC RBC-ENTMCNC: 34.2 GM/DL — SIGNIFICANT CHANGE UP (ref 32–36)
MCV RBC AUTO: 83.4 FL — SIGNIFICANT CHANGE UP (ref 80–100)
PHOSPHATE SERPL-MCNC: 3.7 MG/DL — SIGNIFICANT CHANGE UP (ref 2.4–4.7)
PLATELET # BLD AUTO: 367 K/UL — SIGNIFICANT CHANGE UP (ref 150–400)
POTASSIUM SERPL-MCNC: 5.3 MMOL/L — SIGNIFICANT CHANGE UP (ref 3.5–5.3)
POTASSIUM SERPL-SCNC: 5.3 MMOL/L — SIGNIFICANT CHANGE UP (ref 3.5–5.3)
RBC # BLD: 4.1 M/UL — SIGNIFICANT CHANGE UP (ref 3.8–5.2)
RBC # FLD: 14.9 % — HIGH (ref 10.3–14.5)
SODIUM SERPL-SCNC: 137 MMOL/L — SIGNIFICANT CHANGE UP (ref 135–145)
WBC # BLD: 11.07 K/UL — HIGH (ref 3.8–10.5)
WBC # FLD AUTO: 11.07 K/UL — HIGH (ref 3.8–10.5)

## 2024-06-19 PROCEDURE — 99285 EMERGENCY DEPT VISIT HI MDM: CPT | Mod: 25

## 2024-06-19 PROCEDURE — 84100 ASSAY OF PHOSPHORUS: CPT

## 2024-06-19 PROCEDURE — 96375 TX/PRO/DX INJ NEW DRUG ADDON: CPT

## 2024-06-19 PROCEDURE — 36415 COLL VENOUS BLD VENIPUNCTURE: CPT

## 2024-06-19 PROCEDURE — 93010 ELECTROCARDIOGRAM REPORT: CPT

## 2024-06-19 PROCEDURE — 82962 GLUCOSE BLOOD TEST: CPT

## 2024-06-19 PROCEDURE — 85027 COMPLETE CBC AUTOMATED: CPT

## 2024-06-19 PROCEDURE — 96374 THER/PROPH/DIAG INJ IV PUSH: CPT

## 2024-06-19 PROCEDURE — 99239 HOSP IP/OBS DSCHRG MGMT >30: CPT

## 2024-06-19 PROCEDURE — 85025 COMPLETE CBC W/AUTO DIFF WBC: CPT

## 2024-06-19 PROCEDURE — 80048 BASIC METABOLIC PNL TOTAL CA: CPT

## 2024-06-19 PROCEDURE — 93005 ELECTROCARDIOGRAM TRACING: CPT

## 2024-06-19 PROCEDURE — 80053 COMPREHEN METABOLIC PANEL: CPT

## 2024-06-19 PROCEDURE — 83735 ASSAY OF MAGNESIUM: CPT

## 2024-06-19 RX ORDER — FUROSEMIDE 10 MG/ML
40 INJECTION, SOLUTION INTRAMUSCULAR; INTRAVENOUS DAILY
Refills: 0 | Status: DISCONTINUED | OUTPATIENT
Start: 2024-06-19 | End: 2024-06-19

## 2024-06-19 RX ORDER — ACETAMINOPHEN 325 MG
650 TABLET ORAL EVERY 6 HOURS
Refills: 0 | Status: DISCONTINUED | OUTPATIENT
Start: 2024-06-19 | End: 2024-06-19

## 2024-06-19 RX ORDER — CLOPIDOGREL BISULFATE 75 MG/1
1 TABLET, FILM COATED ORAL
Refills: 0 | DISCHARGE

## 2024-06-19 RX ORDER — DEXTROSE MONOHYDRATE AND SODIUM CHLORIDE 5; .3 G/100ML; G/100ML
1000 INJECTION, SOLUTION INTRAVENOUS
Refills: 0 | Status: DISCONTINUED | OUTPATIENT
Start: 2024-06-19 | End: 2024-06-19

## 2024-06-19 RX ORDER — DEXTROSE 30 % IN WATER 30 %
12.5 VIAL (ML) INTRAVENOUS ONCE
Refills: 0 | Status: DISCONTINUED | OUTPATIENT
Start: 2024-06-19 | End: 2024-06-19

## 2024-06-19 RX ORDER — ASPIRIN 325 MG/1
81 TABLET, FILM COATED ORAL DAILY
Refills: 0 | Status: DISCONTINUED | OUTPATIENT
Start: 2024-06-19 | End: 2024-06-19

## 2024-06-19 RX ORDER — ENOXAPARIN SODIUM 100 MG/ML
40 INJECTION SUBCUTANEOUS EVERY 24 HOURS
Refills: 0 | Status: DISCONTINUED | OUTPATIENT
Start: 2024-06-19 | End: 2024-06-19

## 2024-06-19 RX ORDER — VALSARTAN 80 MG/1
1 TABLET ORAL
Refills: 0 | DISCHARGE

## 2024-06-19 RX ORDER — INSULIN LISPRO 100 [IU]/ML
INJECTION, SOLUTION SUBCUTANEOUS
Refills: 0 | Status: DISCONTINUED | OUTPATIENT
Start: 2024-06-19 | End: 2024-06-19

## 2024-06-19 RX ORDER — DEXTROSE MONOHYDRATE 100 MG/ML
125 INJECTION, SOLUTION INTRAVENOUS ONCE
Refills: 0 | Status: DISCONTINUED | OUTPATIENT
Start: 2024-06-19 | End: 2024-06-19

## 2024-06-19 RX ORDER — ONDANSETRON HYDROCHLORIDE 2 MG/ML
4 INJECTION INTRAMUSCULAR; INTRAVENOUS EVERY 8 HOURS
Refills: 0 | Status: DISCONTINUED | OUTPATIENT
Start: 2024-06-19 | End: 2024-06-19

## 2024-06-19 RX ORDER — METOPROLOL TARTRATE 50 MG
100 TABLET ORAL DAILY
Refills: 0 | Status: DISCONTINUED | OUTPATIENT
Start: 2024-06-19 | End: 2024-06-19

## 2024-06-19 RX ORDER — INSULIN GLARGINE 100 [IU]/ML
20 INJECTION, SOLUTION SUBCUTANEOUS AT BEDTIME
Refills: 0 | Status: DISCONTINUED | OUTPATIENT
Start: 2024-06-19 | End: 2024-06-19

## 2024-06-19 RX ORDER — MAGNESIUM, ALUMINUM HYDROXIDE 400-400
30 TABLET,CHEWABLE ORAL EVERY 4 HOURS
Refills: 0 | Status: DISCONTINUED | OUTPATIENT
Start: 2024-06-19 | End: 2024-06-19

## 2024-06-19 RX ORDER — DEXTROSE 30 % IN WATER 30 %
25 VIAL (ML) INTRAVENOUS ONCE
Refills: 0 | Status: DISCONTINUED | OUTPATIENT
Start: 2024-06-19 | End: 2024-06-19

## 2024-06-19 RX ORDER — AMLODIPINE BESYLATE 2.5 MG/1
10 TABLET ORAL DAILY
Refills: 0 | Status: DISCONTINUED | OUTPATIENT
Start: 2024-06-19 | End: 2024-06-19

## 2024-06-19 RX ORDER — SPIRONOLACTONE 25 MG/1
1 TABLET ORAL
Refills: 0 | DISCHARGE

## 2024-06-19 RX ORDER — GLUCAGON HYDROCHLORIDE 1 MG/ML
1 INJECTION, POWDER, FOR SOLUTION INTRAMUSCULAR; INTRAVENOUS; SUBCUTANEOUS ONCE
Refills: 0 | Status: DISCONTINUED | OUTPATIENT
Start: 2024-06-19 | End: 2024-06-19

## 2024-06-19 RX ORDER — CLOPIDOGREL BISULFATE 75 MG/1
75 TABLET, FILM COATED ORAL DAILY
Refills: 0 | Status: DISCONTINUED | OUTPATIENT
Start: 2024-06-19 | End: 2024-06-19

## 2024-06-19 RX ORDER — ATORVASTATIN CALCIUM 20 MG/1
80 TABLET, FILM COATED ORAL AT BEDTIME
Refills: 0 | Status: DISCONTINUED | OUTPATIENT
Start: 2024-06-19 | End: 2024-06-19

## 2024-06-19 RX ORDER — GABAPENTIN
300 POWDER (GRAM) MISCELLANEOUS AT BEDTIME
Refills: 0 | Status: DISCONTINUED | OUTPATIENT
Start: 2024-06-19 | End: 2024-06-19

## 2024-06-19 RX ORDER — ISOSORBIDE MONONITRATE 30 MG/1
30 TABLET, EXTENDED RELEASE ORAL DAILY
Refills: 0 | Status: DISCONTINUED | OUTPATIENT
Start: 2024-06-19 | End: 2024-06-19

## 2024-06-19 RX ORDER — AMLODIPINE BESYLATE 2.5 MG/1
1 TABLET ORAL
Refills: 0 | DISCHARGE

## 2024-06-19 RX ORDER — PANTOPRAZOLE SODIUM 40 MG/10ML
40 INJECTION, POWDER, FOR SOLUTION INTRAVENOUS
Refills: 0 | Status: DISCONTINUED | OUTPATIENT
Start: 2024-06-19 | End: 2024-06-19

## 2024-06-19 RX ORDER — DEXTROSE 30 % IN WATER 30 %
15 VIAL (ML) INTRAVENOUS ONCE
Refills: 0 | Status: DISCONTINUED | OUTPATIENT
Start: 2024-06-19 | End: 2024-06-19

## 2024-06-19 RX ADMIN — INSULIN LISPRO 2: 100 INJECTION, SOLUTION SUBCUTANEOUS at 08:14

## 2024-06-19 RX ADMIN — CLOPIDOGREL BISULFATE 75 MILLIGRAM(S): 75 TABLET, FILM COATED ORAL at 11:11

## 2024-06-19 RX ADMIN — Medication 100 MILLIGRAM(S): at 05:25

## 2024-06-19 RX ADMIN — ENOXAPARIN SODIUM 40 MILLIGRAM(S): 100 INJECTION SUBCUTANEOUS at 05:25

## 2024-06-19 RX ADMIN — ISOSORBIDE MONONITRATE 30 MILLIGRAM(S): 30 TABLET, EXTENDED RELEASE ORAL at 11:53

## 2024-06-19 RX ADMIN — AMLODIPINE BESYLATE 10 MILLIGRAM(S): 2.5 TABLET ORAL at 05:25

## 2024-06-19 RX ADMIN — ASPIRIN 81 MILLIGRAM(S): 325 TABLET, FILM COATED ORAL at 11:11

## 2024-06-19 RX ADMIN — INSULIN LISPRO 4: 100 INJECTION, SOLUTION SUBCUTANEOUS at 11:53

## 2024-06-19 RX ADMIN — PANTOPRAZOLE SODIUM 40 MILLIGRAM(S): 40 INJECTION, POWDER, FOR SOLUTION INTRAVENOUS at 08:15

## 2024-06-19 RX ADMIN — FUROSEMIDE 40 MILLIGRAM(S): 10 INJECTION, SOLUTION INTRAMUSCULAR; INTRAVENOUS at 05:25

## 2024-06-24 NOTE — PHYSICAL EXAM
[Normal] : no edema, no cyanosis, no clubbing, no varicosities [de-identified] : Chaperone: Deanne Jaimes MA

## 2024-06-24 NOTE — ASSESSMENT
[FreeTextEntry1] : Left heart cardiac catheterization December 2, 2021:DIAGNOSTIC RECOMMENDATIONS: Branch vessel and severe distal LAD and RPDA.disease not suitable for PCI , recommend medical therapy with antianginal if persistent angina despite optimal medical therapy may consider PCI of OM1 of CX. Prepared and signed by Amilcar Vivar MD Signed 12/02/2021 06:31:12  Echocardiogram November 25, 2021: LVEF 55 to 60%.  EKG 12/4/2023-  Paced rhythm Assessment: 1. CAD-patient has multivessel CAD based on cardiac catheterization in November 2021, NSTEMI in 11/2021 not amenable for PCI, patient is treated medically. Patient is currently asymptomatic, no symptoms suggestive of angina or CHF  2. Mobitz 2 heart block S/P successful Medtronic Micra AV+ leadless pacemaker implantation  3. HTN-controlled  4. DM/HLD/CVA and other problems as noted in the history 5. . CHF- Patient was admitted to hospital in [facility] treated for a CHF.  Patient's chest x-ray showed cardiomegaly and bilateral pleural effusions.  Currently she appears euvolemic.  No symptoms of CHF. 6. Bilateral pleural effusions  Recommendations:  1. Continue current medical therapy   2. Follow-up in 2 months. 3. Patient is referred for pulmonary consultation. 4. Patient is advised to keep her scheduled for echo and nuclear stress test. 5. BMP

## 2024-06-24 NOTE — HISTORY OF PRESENT ILLNESS
[FreeTextEntry1] : : Patient's Granddaughter is in the examination room who is interpreting, - Lexi (she is a nursing student) They brought all of her medications.  Patient is a 73 year old [place] speaking woman with HTN, HLD, DM, prior CVAwith Right sided deficits, prior ABI/BSO, RBBB who had with COVID-19 viral pneumonia  in  May/June 2020 and  was found to have Mobitz II block is  s/p successful Medtronic Micra AV+ leadless pacemaker implantation via right femoral vein.  Patient is followed by me for coronary artery disease. Patient was admitted to [facility] in 11/2021 for NSTEMI.she had a left heart cardiac catheterization which showed showed  diffuse multivessel disease RCA, LAD, OM. Patient was explained that she had NSTEMI, Cath showed 2 Vessel CAD which are not amenable for PCI. Patient to be managed medically  Patient's medical therapy was optimized, amlodipine was discontinued in order to facilitate higher dose beta-blocker therapy.  Patient discharged home on Plavix, aspirin, Ranexa 500 twice daily, atorvastatin 80 mg daily, Toprol- mg daily.  Patient is managed medically for her multivessel coronary artery disease.  .     Today patient presents for a follow-up visit.  Patient was recently seen by SCOOTER Brown on April 30, 2024.  Patient had to admissions to the hospital in [facility] for symptoms of shortness of breath, leg edema and chest pain.  Patient was diagnosed with a bilateral pleural effusions treated with diuretics.  She also was diagnosed with pneumonia and UTI which were treated accordingly.  Patient was noted to have a cardiomegaly on the chest x-ray.  Patient's symptoms of dyspnea and leg swelling have improved markedly.  Patient had a device check in June 2023..  Patient brought all of her medications.

## 2024-06-24 NOTE — ADDENDUM
[FreeTextEntry1] : 6/24/2024:  Ms. Ko has Pharm NST last 5/31/2024 revealing normal myocardial perfusion scan, with no evidence of infarction or inducible ischemia. She's stable from cardiology point of view for upcoming repeat RLE angiogram with possible AT recanalization to be performed by Dr. TRACEY WILSON.   Fransisco Armendariz, NP

## 2024-06-25 ENCOUNTER — EMERGENCY (EMERGENCY)
Facility: HOSPITAL | Age: 74
LOS: 1 days | Discharge: HOME | End: 2024-06-25
Attending: EMERGENCY MEDICINE
Payer: MEDICARE

## 2024-06-25 ENCOUNTER — APPOINTMENT (OUTPATIENT)
Dept: ELECTROPHYSIOLOGY | Facility: CLINIC | Age: 74
End: 2024-06-25

## 2024-06-25 VITALS
SYSTOLIC BLOOD PRESSURE: 156 MMHG | RESPIRATION RATE: 16 BRPM | HEART RATE: 78 BPM | OXYGEN SATURATION: 98 % | TEMPERATURE: 99 F | DIASTOLIC BLOOD PRESSURE: 89 MMHG

## 2024-06-25 VITALS
TEMPERATURE: 99 F | HEIGHT: 61 IN | RESPIRATION RATE: 18 BRPM | HEART RATE: 67 BPM | SYSTOLIC BLOOD PRESSURE: 172 MMHG | WEIGHT: 151.46 LBS | DIASTOLIC BLOOD PRESSURE: 78 MMHG | OXYGEN SATURATION: 99 %

## 2024-06-25 DIAGNOSIS — Z98.890 OTHER SPECIFIED POSTPROCEDURAL STATES: Chronic | ICD-10-CM

## 2024-06-25 DIAGNOSIS — Z86.011 PERSONAL HISTORY OF BENIGN NEOPLASM OF THE BRAIN: Chronic | ICD-10-CM

## 2024-06-25 DIAGNOSIS — H26.9 UNSPECIFIED CATARACT: Chronic | ICD-10-CM

## 2024-06-25 DIAGNOSIS — Z95.0 PRESENCE OF CARDIAC PACEMAKER: Chronic | ICD-10-CM

## 2024-06-25 PROBLEM — S81.801A UNSPECIFIED OPEN WOUND, RIGHT LOWER LEG, INITIAL ENCOUNTER: Chronic | Status: ACTIVE | Noted: 2024-06-18

## 2024-06-25 PROBLEM — I73.9 PERIPHERAL VASCULAR DISEASE, UNSPECIFIED: Chronic | Status: ACTIVE | Noted: 2024-06-18

## 2024-06-25 LAB
ALBUMIN SERPL ELPH-MCNC: 4.4 G/DL — SIGNIFICANT CHANGE UP (ref 3.3–5.2)
ALP SERPL-CCNC: 98 U/L — SIGNIFICANT CHANGE UP (ref 40–120)
ALT FLD-CCNC: 24 U/L — SIGNIFICANT CHANGE UP
ANION GAP SERPL CALC-SCNC: 15 MMOL/L — SIGNIFICANT CHANGE UP (ref 5–17)
AST SERPL-CCNC: 22 U/L — SIGNIFICANT CHANGE UP
BASOPHILS # BLD AUTO: 0.06 K/UL — SIGNIFICANT CHANGE UP (ref 0–0.2)
BASOPHILS NFR BLD AUTO: 0.7 % — SIGNIFICANT CHANGE UP (ref 0–2)
BILIRUB SERPL-MCNC: 0.4 MG/DL — SIGNIFICANT CHANGE UP (ref 0.4–2)
BUN SERPL-MCNC: 17.2 MG/DL — SIGNIFICANT CHANGE UP (ref 8–20)
CALCIUM SERPL-MCNC: 9.9 MG/DL — SIGNIFICANT CHANGE UP (ref 8.4–10.5)
CHLORIDE SERPL-SCNC: 100 MMOL/L — SIGNIFICANT CHANGE UP (ref 96–108)
CO2 SERPL-SCNC: 22 MMOL/L — SIGNIFICANT CHANGE UP (ref 22–29)
CREAT SERPL-MCNC: 0.9 MG/DL — SIGNIFICANT CHANGE UP (ref 0.5–1.3)
EGFR: 68 ML/MIN/1.73M2 — SIGNIFICANT CHANGE UP
EOSINOPHIL # BLD AUTO: 0.27 K/UL — SIGNIFICANT CHANGE UP (ref 0–0.5)
EOSINOPHIL NFR BLD AUTO: 3 % — SIGNIFICANT CHANGE UP (ref 0–6)
GLUCOSE SERPL-MCNC: 181 MG/DL — HIGH (ref 70–99)
HCT VFR BLD CALC: 32.6 % — LOW (ref 34.5–45)
HGB BLD-MCNC: 11 G/DL — LOW (ref 11.5–15.5)
IMM GRANULOCYTES NFR BLD AUTO: 0.8 % — SIGNIFICANT CHANGE UP (ref 0–0.9)
LYMPHOCYTES # BLD AUTO: 2.59 K/UL — SIGNIFICANT CHANGE UP (ref 1–3.3)
LYMPHOCYTES # BLD AUTO: 28.6 % — SIGNIFICANT CHANGE UP (ref 13–44)
MCHC RBC-ENTMCNC: 28.4 PG — SIGNIFICANT CHANGE UP (ref 27–34)
MCHC RBC-ENTMCNC: 33.7 GM/DL — SIGNIFICANT CHANGE UP (ref 32–36)
MCV RBC AUTO: 84.2 FL — SIGNIFICANT CHANGE UP (ref 80–100)
MONOCYTES # BLD AUTO: 0.78 K/UL — SIGNIFICANT CHANGE UP (ref 0–0.9)
MONOCYTES NFR BLD AUTO: 8.6 % — SIGNIFICANT CHANGE UP (ref 2–14)
NEUTROPHILS # BLD AUTO: 5.28 K/UL — SIGNIFICANT CHANGE UP (ref 1.8–7.4)
NEUTROPHILS NFR BLD AUTO: 58.3 % — SIGNIFICANT CHANGE UP (ref 43–77)
PLATELET # BLD AUTO: 388 K/UL — SIGNIFICANT CHANGE UP (ref 150–400)
POTASSIUM SERPL-MCNC: 5.2 MMOL/L — SIGNIFICANT CHANGE UP (ref 3.5–5.3)
POTASSIUM SERPL-SCNC: 5.2 MMOL/L — SIGNIFICANT CHANGE UP (ref 3.5–5.3)
PROT SERPL-MCNC: 7.8 G/DL — SIGNIFICANT CHANGE UP (ref 6.6–8.7)
RBC # BLD: 3.87 M/UL — SIGNIFICANT CHANGE UP (ref 3.8–5.2)
RBC # FLD: 15 % — HIGH (ref 10.3–14.5)
SODIUM SERPL-SCNC: 137 MMOL/L — SIGNIFICANT CHANGE UP (ref 135–145)
TROPONIN T, HIGH SENSITIVITY RESULT: 21 NG/L — SIGNIFICANT CHANGE UP (ref 0–51)
TROPONIN T, HIGH SENSITIVITY RESULT: 21 NG/L — SIGNIFICANT CHANGE UP (ref 0–51)
WBC # BLD: 9.05 K/UL — SIGNIFICANT CHANGE UP (ref 3.8–10.5)
WBC # FLD AUTO: 9.05 K/UL — SIGNIFICANT CHANGE UP (ref 3.8–10.5)

## 2024-06-25 PROCEDURE — 84484 ASSAY OF TROPONIN QUANT: CPT

## 2024-06-25 PROCEDURE — 71045 X-RAY EXAM CHEST 1 VIEW: CPT | Mod: 26

## 2024-06-25 PROCEDURE — 80053 COMPREHEN METABOLIC PANEL: CPT

## 2024-06-25 PROCEDURE — 99284 EMERGENCY DEPT VISIT MOD MDM: CPT | Mod: 25

## 2024-06-25 PROCEDURE — 93010 ELECTROCARDIOGRAM REPORT: CPT

## 2024-06-25 PROCEDURE — 99285 EMERGENCY DEPT VISIT HI MDM: CPT | Mod: 25

## 2024-06-25 PROCEDURE — 71045 X-RAY EXAM CHEST 1 VIEW: CPT

## 2024-06-25 PROCEDURE — 93005 ELECTROCARDIOGRAM TRACING: CPT

## 2024-06-25 PROCEDURE — T1013: CPT

## 2024-06-25 PROCEDURE — 36415 COLL VENOUS BLD VENIPUNCTURE: CPT

## 2024-06-25 PROCEDURE — 85025 COMPLETE CBC W/AUTO DIFF WBC: CPT

## 2024-06-25 PROCEDURE — 36000 PLACE NEEDLE IN VEIN: CPT

## 2024-07-01 ENCOUNTER — APPOINTMENT (OUTPATIENT)
Dept: CARDIOLOGY | Facility: CLINIC | Age: 74
End: 2024-07-01
Payer: MEDICARE

## 2024-07-01 VITALS
TEMPERATURE: 98.6 F | DIASTOLIC BLOOD PRESSURE: 71 MMHG | HEART RATE: 71 BPM | OXYGEN SATURATION: 98 % | SYSTOLIC BLOOD PRESSURE: 137 MMHG

## 2024-07-01 VITALS — WEIGHT: 148 LBS | HEIGHT: 59 IN | BODY MASS INDEX: 29.84 KG/M2

## 2024-07-01 DIAGNOSIS — I25.10 ATHEROSCLEROTIC HEART DISEASE OF NATIVE CORONARY ARTERY W/OUT ANGINA PECTORIS: ICD-10-CM

## 2024-07-01 PROCEDURE — 93000 ELECTROCARDIOGRAM COMPLETE: CPT

## 2024-07-01 PROCEDURE — 99213 OFFICE O/P EST LOW 20 MIN: CPT | Mod: 25

## 2024-07-08 ENCOUNTER — TRANSCRIPTION ENCOUNTER (OUTPATIENT)
Age: 74
End: 2024-07-08

## 2024-07-09 ENCOUNTER — INPATIENT (INPATIENT)
Facility: HOSPITAL | Age: 74
LOS: 3 days | Discharge: HOME | DRG: 301 | End: 2024-07-13
Attending: STUDENT IN AN ORGANIZED HEALTH CARE EDUCATION/TRAINING PROGRAM | Admitting: STUDENT IN AN ORGANIZED HEALTH CARE EDUCATION/TRAINING PROGRAM
Payer: MEDICARE

## 2024-07-09 ENCOUNTER — APPOINTMENT (OUTPATIENT)
Dept: VASCULAR SURGERY | Facility: HOSPITAL | Age: 74
End: 2024-07-09

## 2024-07-09 VITALS
RESPIRATION RATE: 16 BRPM | HEART RATE: 68 BPM | OXYGEN SATURATION: 99 % | DIASTOLIC BLOOD PRESSURE: 73 MMHG | SYSTOLIC BLOOD PRESSURE: 158 MMHG | WEIGHT: 147.93 LBS | HEIGHT: 59 IN | TEMPERATURE: 97 F

## 2024-07-09 DIAGNOSIS — Z86.011 PERSONAL HISTORY OF BENIGN NEOPLASM OF THE BRAIN: Chronic | ICD-10-CM

## 2024-07-09 DIAGNOSIS — Z98.890 OTHER SPECIFIED POSTPROCEDURAL STATES: Chronic | ICD-10-CM

## 2024-07-09 DIAGNOSIS — Z95.0 PRESENCE OF CARDIAC PACEMAKER: Chronic | ICD-10-CM

## 2024-07-09 DIAGNOSIS — I73.9 PERIPHERAL VASCULAR DISEASE, UNSPECIFIED: ICD-10-CM

## 2024-07-09 DIAGNOSIS — H26.9 UNSPECIFIED CATARACT: Chronic | ICD-10-CM

## 2024-07-09 LAB
ANION GAP SERPL CALC-SCNC: 14 MMOL/L — SIGNIFICANT CHANGE UP (ref 5–17)
BLD GP AB SCN SERPL QL: SIGNIFICANT CHANGE UP
BUN SERPL-MCNC: 23 MG/DL — HIGH (ref 8–20)
CALCIUM SERPL-MCNC: 8.4 MG/DL — SIGNIFICANT CHANGE UP (ref 8.4–10.5)
CHLORIDE SERPL-SCNC: 102 MMOL/L — SIGNIFICANT CHANGE UP (ref 96–108)
CO2 SERPL-SCNC: 20 MMOL/L — LOW (ref 22–29)
CREAT SERPL-MCNC: 0.95 MG/DL — SIGNIFICANT CHANGE UP (ref 0.5–1.3)
EGFR: 64 ML/MIN/1.73M2 — SIGNIFICANT CHANGE UP
EGFR: 64 ML/MIN/1.73M2 — SIGNIFICANT CHANGE UP
GLUCOSE BLDC GLUCOMTR-MCNC: 139 MG/DL — HIGH (ref 70–99)
GLUCOSE BLDC GLUCOMTR-MCNC: 168 MG/DL — HIGH (ref 70–99)
GLUCOSE BLDC GLUCOMTR-MCNC: 195 MG/DL — HIGH (ref 70–99)
GLUCOSE BLDC GLUCOMTR-MCNC: 307 MG/DL — HIGH (ref 70–99)
GLUCOSE SERPL-MCNC: 167 MG/DL — HIGH (ref 70–99)
POTASSIUM SERPL-MCNC: 4.8 MMOL/L — SIGNIFICANT CHANGE UP (ref 3.5–5.3)
POTASSIUM SERPL-SCNC: 4.8 MMOL/L — SIGNIFICANT CHANGE UP (ref 3.5–5.3)
SODIUM SERPL-SCNC: 136 MMOL/L — SIGNIFICANT CHANGE UP (ref 135–145)

## 2024-07-09 PROCEDURE — 75710 ARTERY X-RAYS ARM/LEG: CPT | Mod: 26,GC

## 2024-07-09 PROCEDURE — 11042 DBRDMT SUBQ TIS 1ST 20SQCM/<: CPT | Mod: GC

## 2024-07-09 PROCEDURE — 71045 X-RAY EXAM CHEST 1 VIEW: CPT | Mod: 26

## 2024-07-09 PROCEDURE — 36246 INS CATH ABD/L-EXT ART 2ND: CPT | Mod: GC

## 2024-07-09 DEVICE — IMPLANTABLE DEVICE: Type: IMPLANTABLE DEVICE | Site: RIGHT | Status: FUNCTIONAL

## 2024-07-09 DEVICE — CATH SUPPORT CXI 4FX135CM ST TIP: Type: IMPLANTABLE DEVICE | Site: RIGHT | Status: FUNCTIONAL

## 2024-07-09 DEVICE — INTRO SHEATH ANSEL 5FR 90CM: Type: IMPLANTABLE DEVICE | Site: RIGHT | Status: FUNCTIONAL

## 2024-07-09 DEVICE — ANGIO CATH GLIDECATH ANGLE 5FR X 100CM: Type: IMPLANTABLE DEVICE | Site: RIGHT | Status: FUNCTIONAL

## 2024-07-09 DEVICE — GUIDEWIRE ADVANTAGE .014INX300CM: Type: IMPLANTABLE DEVICE | Site: RIGHT | Status: FUNCTIONAL

## 2024-07-09 DEVICE — WIRE APPROACH HYDRO STRT 300CM: Type: IMPLANTABLE DEVICE | Site: RIGHT | Status: FUNCTIONAL

## 2024-07-09 DEVICE — CATH ANGIO OMNI SOFVU 038 5FR: Type: IMPLANTABLE DEVICE | Site: RIGHT | Status: FUNCTIONAL

## 2024-07-09 DEVICE — SET ACCESS MICROPUNCTURE PEDAL: Type: IMPLANTABLE DEVICE | Site: RIGHT | Status: FUNCTIONAL

## 2024-07-09 DEVICE — GWIRE VASC ENTRY MINISTICK MAX 5FR 10CM: Type: IMPLANTABLE DEVICE | Site: RIGHT | Status: FUNCTIONAL

## 2024-07-09 DEVICE — CATH SUPPORT CXI 2.3X150CM ST TIP: Type: IMPLANTABLE DEVICE | Site: RIGHT | Status: FUNCTIONAL

## 2024-07-09 DEVICE — DEVICE CLOSURE 5F MYNX GRIP MUST ORDER MIN OF 10 EA: Type: IMPLANTABLE DEVICE | Site: RIGHT | Status: FUNCTIONAL

## 2024-07-09 DEVICE — GUIDEWIRE RADIFOCUS GLIDEWIRE ANGLED 0.035" X 260CM STIFF: Type: IMPLANTABLE DEVICE | Site: RIGHT | Status: FUNCTIONAL

## 2024-07-09 RX ORDER — SODIUM CHLORIDE 9 G/1000ML
1000 INJECTION, SOLUTION INTRAVENOUS
Refills: 0 | Status: DISCONTINUED | OUTPATIENT
Start: 2024-07-09 | End: 2024-07-13

## 2024-07-09 RX ORDER — ASPIRIN 325 MG
81 TABLET ORAL DAILY
Refills: 0 | Status: DISCONTINUED | OUTPATIENT
Start: 2024-07-09 | End: 2024-07-13

## 2024-07-09 RX ORDER — FENTANYL CITRATE-0.9 % NACL/PF 100MCG/2ML
25 SYRINGE (ML) INTRAVENOUS
Refills: 0 | Status: DISCONTINUED | OUTPATIENT
Start: 2024-07-09 | End: 2024-07-09

## 2024-07-09 RX ORDER — AMLODIPINE BESYLATE 10 MG/1
10 TABLET ORAL DAILY
Refills: 0 | Status: DISCONTINUED | OUTPATIENT
Start: 2024-07-09 | End: 2024-07-13

## 2024-07-09 RX ORDER — ISOSORBIDE MONONITRATE 60 MG/1
30 TABLET, EXTENDED RELEASE ORAL DAILY
Refills: 0 | Status: DISCONTINUED | OUTPATIENT
Start: 2024-07-09 | End: 2024-07-13

## 2024-07-09 RX ORDER — ONDANSETRON HCL/PF 4 MG/2 ML
4 VIAL (ML) INJECTION EVERY 6 HOURS
Refills: 0 | Status: DISCONTINUED | OUTPATIENT
Start: 2024-07-09 | End: 2024-07-13

## 2024-07-09 RX ORDER — ATORVASTATIN CALCIUM 80 MG/1
80 TABLET, FILM COATED ORAL AT BEDTIME
Refills: 0 | Status: DISCONTINUED | OUTPATIENT
Start: 2024-07-09 | End: 2024-07-13

## 2024-07-09 RX ORDER — GLUCAGON 3 MG/1
1 POWDER NASAL ONCE
Refills: 0 | Status: DISCONTINUED | OUTPATIENT
Start: 2024-07-09 | End: 2024-07-13

## 2024-07-09 RX ORDER — FENTANYL CITRATE-0.9 % NACL/PF 100MCG/2ML
50 SYRINGE (ML) INTRAVENOUS
Refills: 0 | Status: DISCONTINUED | OUTPATIENT
Start: 2024-07-09 | End: 2024-07-09

## 2024-07-09 RX ORDER — ACETAMINOPHEN 500 MG/5ML
1000 LIQUID (ML) ORAL EVERY 6 HOURS
Refills: 0 | Status: COMPLETED | OUTPATIENT
Start: 2024-07-09 | End: 2024-07-10

## 2024-07-09 RX ORDER — INSULIN GLARGINE-YFGN 100 [IU]/ML
30 INJECTION, SOLUTION SUBCUTANEOUS AT BEDTIME
Refills: 0 | Status: DISCONTINUED | OUTPATIENT
Start: 2024-07-09 | End: 2024-07-13

## 2024-07-09 RX ORDER — METOPROLOL SUCCINATE 50 MG/1
100 TABLET, EXTENDED RELEASE ORAL DAILY
Refills: 0 | Status: DISCONTINUED | OUTPATIENT
Start: 2024-07-09 | End: 2024-07-13

## 2024-07-09 RX ORDER — CLOPIDOGREL BISULFATE 75 MG/1
75 TABLET, FILM COATED ORAL DAILY
Refills: 0 | Status: DISCONTINUED | OUTPATIENT
Start: 2024-07-09 | End: 2024-07-13

## 2024-07-09 RX ORDER — DEXTROSE 50 % IN WATER 50 %
12.5 SYRINGE (ML) INTRAVENOUS ONCE
Refills: 0 | Status: DISCONTINUED | OUTPATIENT
Start: 2024-07-09 | End: 2024-07-13

## 2024-07-09 RX ORDER — DEXTROSE 50 % IN WATER 50 %
25 SYRINGE (ML) INTRAVENOUS ONCE
Refills: 0 | Status: DISCONTINUED | OUTPATIENT
Start: 2024-07-09 | End: 2024-07-13

## 2024-07-09 RX ORDER — FUROSEMIDE 10 MG/ML
1 INJECTION, SOLUTION INTRAMUSCULAR; INTRAVENOUS
Refills: 0 | DISCHARGE

## 2024-07-09 RX ORDER — OXYCODONE HYDROCHLORIDE 30 MG/1
5 TABLET ORAL EVERY 6 HOURS
Refills: 0 | Status: DISCONTINUED | OUTPATIENT
Start: 2024-07-09 | End: 2024-07-13

## 2024-07-09 RX ORDER — GABAPENTIN 400 MG/1
300 CAPSULE ORAL AT BEDTIME
Refills: 0 | Status: DISCONTINUED | OUTPATIENT
Start: 2024-07-09 | End: 2024-07-13

## 2024-07-09 RX ORDER — ONDANSETRON HCL/PF 4 MG/2 ML
4 VIAL (ML) INJECTION ONCE
Refills: 0 | Status: DISCONTINUED | OUTPATIENT
Start: 2024-07-09 | End: 2024-07-09

## 2024-07-09 RX ORDER — SODIUM CHLORIDE 9 G/1000ML
1000 INJECTION, SOLUTION INTRAVENOUS
Refills: 0 | Status: DISCONTINUED | OUTPATIENT
Start: 2024-07-09 | End: 2024-07-09

## 2024-07-09 RX ORDER — INSULIN LISPRO 100 U/ML
INJECTION, SOLUTION INTRAVENOUS; SUBCUTANEOUS
Refills: 0 | Status: DISCONTINUED | OUTPATIENT
Start: 2024-07-09 | End: 2024-07-13

## 2024-07-09 RX ORDER — DEXTROSE 50 % IN WATER 50 %
15 SYRINGE (ML) INTRAVENOUS ONCE
Refills: 0 | Status: DISCONTINUED | OUTPATIENT
Start: 2024-07-09 | End: 2024-07-13

## 2024-07-09 RX ADMIN — Medication 1000 MILLIGRAM(S): at 17:30

## 2024-07-09 RX ADMIN — CLOPIDOGREL BISULFATE 75 MILLIGRAM(S): 75 TABLET, FILM COATED ORAL at 21:24

## 2024-07-09 RX ADMIN — OXYCODONE HYDROCHLORIDE 5 MILLIGRAM(S): 30 TABLET ORAL at 19:12

## 2024-07-09 RX ADMIN — METOPROLOL SUCCINATE 100 MILLIGRAM(S): 50 TABLET, EXTENDED RELEASE ORAL at 21:19

## 2024-07-09 RX ADMIN — ISOSORBIDE MONONITRATE 30 MILLIGRAM(S): 60 TABLET, EXTENDED RELEASE ORAL at 21:48

## 2024-07-09 RX ADMIN — Medication 50 MICROGRAM(S): at 16:23

## 2024-07-09 RX ADMIN — Medication 975 MILLIGRAM(S): at 10:22

## 2024-07-09 RX ADMIN — GABAPENTIN 300 MILLIGRAM(S): 400 CAPSULE ORAL at 21:17

## 2024-07-09 RX ADMIN — Medication 40 MILLIGRAM(S): at 21:20

## 2024-07-09 RX ADMIN — Medication 400 MILLIGRAM(S): at 23:22

## 2024-07-09 RX ADMIN — INSULIN GLARGINE-YFGN 30 UNIT(S): 100 INJECTION, SOLUTION SUBCUTANEOUS at 21:25

## 2024-07-09 RX ADMIN — Medication 81 MILLIGRAM(S): at 21:24

## 2024-07-09 RX ADMIN — Medication 400 MILLIGRAM(S): at 16:41

## 2024-07-09 RX ADMIN — INSULIN LISPRO 1: 100 INJECTION, SOLUTION INTRAVENOUS; SUBCUTANEOUS at 16:31

## 2024-07-09 RX ADMIN — ATORVASTATIN CALCIUM 80 MILLIGRAM(S): 80 TABLET, FILM COATED ORAL at 21:17

## 2024-07-09 RX ADMIN — Medication 50 MICROGRAM(S): at 16:08

## 2024-07-09 RX ADMIN — Medication 4 MILLIGRAM(S): at 21:19

## 2024-07-10 LAB
ACANTHOCYTES BLD QL SMEAR: SLIGHT — SIGNIFICANT CHANGE UP
ANION GAP SERPL CALC-SCNC: 15 MMOL/L — SIGNIFICANT CHANGE UP (ref 5–17)
ANION GAP SERPL CALC-SCNC: 16 MMOL/L — SIGNIFICANT CHANGE UP (ref 5–17)
ANION GAP SERPL CALC-SCNC: 16 MMOL/L — SIGNIFICANT CHANGE UP (ref 5–17)
ANISOCYTOSIS BLD QL: SLIGHT — SIGNIFICANT CHANGE UP
BASOPHILS # BLD AUTO: 0 K/UL — SIGNIFICANT CHANGE UP (ref 0–0.2)
BASOPHILS NFR BLD AUTO: 0 % — SIGNIFICANT CHANGE UP (ref 0–2)
BUN SERPL-MCNC: 24.7 MG/DL — HIGH (ref 8–20)
BUN SERPL-MCNC: 25.2 MG/DL — HIGH (ref 8–20)
BUN SERPL-MCNC: 26.2 MG/DL — HIGH (ref 8–20)
BURR CELLS BLD QL SMEAR: PRESENT — SIGNIFICANT CHANGE UP
CALCIUM SERPL-MCNC: 7.1 MG/DL — LOW (ref 8.4–10.5)
CALCIUM SERPL-MCNC: 7.3 MG/DL — LOW (ref 8.4–10.5)
CALCIUM SERPL-MCNC: 7.3 MG/DL — LOW (ref 8.4–10.5)
CHLORIDE SERPL-SCNC: 102 MMOL/L — SIGNIFICANT CHANGE UP (ref 96–108)
CHLORIDE SERPL-SCNC: 102 MMOL/L — SIGNIFICANT CHANGE UP (ref 96–108)
CHLORIDE SERPL-SCNC: 105 MMOL/L — SIGNIFICANT CHANGE UP (ref 96–108)
CO2 SERPL-SCNC: 16 MMOL/L — LOW (ref 22–29)
CO2 SERPL-SCNC: 17 MMOL/L — LOW (ref 22–29)
CO2 SERPL-SCNC: 18 MMOL/L — LOW (ref 22–29)
CREAT SERPL-MCNC: 0.9 MG/DL — SIGNIFICANT CHANGE UP (ref 0.5–1.3)
CREAT SERPL-MCNC: 1 MG/DL — SIGNIFICANT CHANGE UP (ref 0.5–1.3)
CREAT SERPL-MCNC: 1.02 MG/DL — SIGNIFICANT CHANGE UP (ref 0.5–1.3)
EGFR: 59 ML/MIN/1.73M2 — LOW
EGFR: 59 ML/MIN/1.73M2 — LOW
EGFR: 60 ML/MIN/1.73M2 — SIGNIFICANT CHANGE UP
EGFR: 60 ML/MIN/1.73M2 — SIGNIFICANT CHANGE UP
EGFR: 68 ML/MIN/1.73M2 — SIGNIFICANT CHANGE UP
EGFR: 68 ML/MIN/1.73M2 — SIGNIFICANT CHANGE UP
EOSINOPHIL # BLD AUTO: 0 K/UL — SIGNIFICANT CHANGE UP (ref 0–0.5)
EOSINOPHIL NFR BLD AUTO: 0 % — SIGNIFICANT CHANGE UP (ref 0–6)
GLUCOSE BLDC GLUCOMTR-MCNC: 193 MG/DL — HIGH (ref 70–99)
GLUCOSE BLDC GLUCOMTR-MCNC: 275 MG/DL — HIGH (ref 70–99)
GLUCOSE BLDC GLUCOMTR-MCNC: 276 MG/DL — HIGH (ref 70–99)
GLUCOSE BLDC GLUCOMTR-MCNC: 290 MG/DL — HIGH (ref 70–99)
GLUCOSE SERPL-MCNC: 230 MG/DL — HIGH (ref 70–99)
GLUCOSE SERPL-MCNC: 297 MG/DL — HIGH (ref 70–99)
GLUCOSE SERPL-MCNC: 331 MG/DL — HIGH (ref 70–99)
HCT VFR BLD CALC: 30.4 % — LOW (ref 34.5–45)
HGB BLD-MCNC: 10 G/DL — LOW (ref 11.5–15.5)
LYMPHOCYTES # BLD AUTO: 0.45 K/UL — LOW (ref 1–3.3)
LYMPHOCYTES # BLD AUTO: 2.6 % — LOW (ref 13–44)
MAGNESIUM SERPL-MCNC: 2 MG/DL — SIGNIFICANT CHANGE UP (ref 1.6–2.6)
MANUAL SMEAR VERIFICATION: SIGNIFICANT CHANGE UP
MCHC RBC-ENTMCNC: 29 PG — SIGNIFICANT CHANGE UP (ref 27–34)
MCHC RBC-ENTMCNC: 32.9 GM/DL — SIGNIFICANT CHANGE UP (ref 32–36)
MCV RBC AUTO: 88.1 FL — SIGNIFICANT CHANGE UP (ref 80–100)
MONOCYTES # BLD AUTO: 0.6 K/UL — SIGNIFICANT CHANGE UP (ref 0–0.9)
MONOCYTES NFR BLD AUTO: 3.5 % — SIGNIFICANT CHANGE UP (ref 2–14)
NEUTROPHILS # BLD AUTO: 16.19 K/UL — HIGH (ref 1.8–7.4)
NEUTROPHILS NFR BLD AUTO: 93.9 % — HIGH (ref 43–77)
OVALOCYTES BLD QL SMEAR: SLIGHT — SIGNIFICANT CHANGE UP
PHOSPHATE SERPL-MCNC: 2.5 MG/DL — SIGNIFICANT CHANGE UP (ref 2.4–4.7)
PLAT MORPH BLD: NORMAL — SIGNIFICANT CHANGE UP
PLATELET # BLD AUTO: 381 K/UL — SIGNIFICANT CHANGE UP (ref 150–400)
POIKILOCYTOSIS BLD QL AUTO: SIGNIFICANT CHANGE UP
POLYCHROMASIA BLD QL SMEAR: SLIGHT — SIGNIFICANT CHANGE UP
POTASSIUM SERPL-MCNC: 4.5 MMOL/L — SIGNIFICANT CHANGE UP (ref 3.5–5.3)
POTASSIUM SERPL-MCNC: 5.5 MMOL/L — HIGH (ref 3.5–5.3)
POTASSIUM SERPL-MCNC: 5.8 MMOL/L — HIGH (ref 3.5–5.3)
POTASSIUM SERPL-SCNC: 4.5 MMOL/L — SIGNIFICANT CHANGE UP (ref 3.5–5.3)
POTASSIUM SERPL-SCNC: 5.5 MMOL/L — HIGH (ref 3.5–5.3)
POTASSIUM SERPL-SCNC: 5.8 MMOL/L — HIGH (ref 3.5–5.3)
RBC # BLD: 3.45 M/UL — LOW (ref 3.8–5.2)
RBC # FLD: 15.8 % — HIGH (ref 10.3–14.5)
RBC BLD AUTO: ABNORMAL
SCHISTOCYTES BLD QL AUTO: SLIGHT — SIGNIFICANT CHANGE UP
SODIUM SERPL-SCNC: 135 MMOL/L — SIGNIFICANT CHANGE UP (ref 135–145)
SODIUM SERPL-SCNC: 135 MMOL/L — SIGNIFICANT CHANGE UP (ref 135–145)
SODIUM SERPL-SCNC: 137 MMOL/L — SIGNIFICANT CHANGE UP (ref 135–145)
SPHEROCYTES BLD QL SMEAR: SLIGHT — SIGNIFICANT CHANGE UP
TROPONIN T, HIGH SENSITIVITY RESULT: 31 NG/L — SIGNIFICANT CHANGE UP (ref 0–51)
WBC # BLD: 17.24 K/UL — HIGH (ref 3.8–10.5)
WBC # FLD AUTO: 17.24 K/UL — HIGH (ref 3.8–10.5)

## 2024-07-10 PROCEDURE — 93010 ELECTROCARDIOGRAM REPORT: CPT

## 2024-07-10 PROCEDURE — 99221 1ST HOSP IP/OBS SF/LOW 40: CPT

## 2024-07-10 RX ORDER — SODIUM ZIRCONIUM CYCLOSILICATE 5 G/5G
10 POWDER, FOR SUSPENSION ORAL ONCE
Refills: 0 | Status: DISCONTINUED | OUTPATIENT
Start: 2024-07-10 | End: 2024-07-10

## 2024-07-10 RX ORDER — FERROUS SULFATE 137(45) MG
325 TABLET, EXTENDED RELEASE ORAL EVERY 8 HOURS
Refills: 0 | Status: DISCONTINUED | OUTPATIENT
Start: 2024-07-10 | End: 2024-07-13

## 2024-07-10 RX ORDER — FUROSEMIDE 10 MG/ML
0 INJECTION INTRAMUSCULAR; INTRAVENOUS
Qty: 0 | Refills: 0 | DISCHARGE

## 2024-07-10 RX ORDER — FUROSEMIDE 10 MG/ML
40 INJECTION INTRAMUSCULAR; INTRAVENOUS DAILY
Refills: 0 | Status: DISCONTINUED | OUTPATIENT
Start: 2024-07-10 | End: 2024-07-13

## 2024-07-10 RX ADMIN — METOPROLOL SUCCINATE 100 MILLIGRAM(S): 50 TABLET, EXTENDED RELEASE ORAL at 05:32

## 2024-07-10 RX ADMIN — INSULIN LISPRO 3: 100 INJECTION, SOLUTION INTRAVENOUS; SUBCUTANEOUS at 17:39

## 2024-07-10 RX ADMIN — Medication 1000 MILLIGRAM(S): at 07:16

## 2024-07-10 RX ADMIN — FUROSEMIDE 40 MILLIGRAM(S): 10 INJECTION INTRAMUSCULAR; INTRAVENOUS at 12:34

## 2024-07-10 RX ADMIN — INSULIN GLARGINE-YFGN 30 UNIT(S): 100 INJECTION, SOLUTION SUBCUTANEOUS at 21:13

## 2024-07-10 RX ADMIN — Medication 1000 MILLIGRAM(S): at 00:20

## 2024-07-10 RX ADMIN — AMLODIPINE BESYLATE 10 MILLIGRAM(S): 10 TABLET ORAL at 05:32

## 2024-07-10 RX ADMIN — ISOSORBIDE MONONITRATE 30 MILLIGRAM(S): 60 TABLET, EXTENDED RELEASE ORAL at 12:34

## 2024-07-10 RX ADMIN — INSULIN LISPRO 3: 100 INJECTION, SOLUTION INTRAVENOUS; SUBCUTANEOUS at 08:32

## 2024-07-10 RX ADMIN — Medication 81 MILLIGRAM(S): at 12:34

## 2024-07-10 RX ADMIN — Medication 325 MILLIGRAM(S): at 21:13

## 2024-07-10 RX ADMIN — GABAPENTIN 300 MILLIGRAM(S): 400 CAPSULE ORAL at 21:13

## 2024-07-10 RX ADMIN — CLOPIDOGREL BISULFATE 75 MILLIGRAM(S): 75 TABLET, FILM COATED ORAL at 12:34

## 2024-07-10 RX ADMIN — Medication 40 MILLIGRAM(S): at 05:32

## 2024-07-10 RX ADMIN — ATORVASTATIN CALCIUM 80 MILLIGRAM(S): 80 TABLET, FILM COATED ORAL at 21:13

## 2024-07-10 RX ADMIN — OXYCODONE HYDROCHLORIDE 5 MILLIGRAM(S): 30 TABLET ORAL at 18:48

## 2024-07-10 RX ADMIN — Medication 1000 MILLIGRAM(S): at 13:34

## 2024-07-10 RX ADMIN — Medication 400 MILLIGRAM(S): at 05:32

## 2024-07-10 RX ADMIN — Medication 325 MILLIGRAM(S): at 12:35

## 2024-07-10 RX ADMIN — INSULIN LISPRO 3: 100 INJECTION, SOLUTION INTRAVENOUS; SUBCUTANEOUS at 12:33

## 2024-07-10 RX ADMIN — OXYCODONE HYDROCHLORIDE 5 MILLIGRAM(S): 30 TABLET ORAL at 17:39

## 2024-07-10 RX ADMIN — Medication 400 MILLIGRAM(S): at 12:34

## 2024-07-11 ENCOUNTER — APPOINTMENT (OUTPATIENT)
Dept: PULMONOLOGY | Facility: CLINIC | Age: 74
End: 2024-07-11

## 2024-07-11 ENCOUNTER — TRANSCRIPTION ENCOUNTER (OUTPATIENT)
Age: 74
End: 2024-07-11

## 2024-07-11 LAB
ANION GAP SERPL CALC-SCNC: 14 MMOL/L — SIGNIFICANT CHANGE UP (ref 5–17)
ANION GAP SERPL CALC-SCNC: 18 MMOL/L — HIGH (ref 5–17)
APPEARANCE UR: CLEAR — SIGNIFICANT CHANGE UP
BACTERIA # UR AUTO: ABNORMAL /HPF
BILIRUB UR-MCNC: NEGATIVE — SIGNIFICANT CHANGE UP
BUN SERPL-MCNC: 30.2 MG/DL — HIGH (ref 8–20)
BUN SERPL-MCNC: 31.7 MG/DL — HIGH (ref 8–20)
CALCIUM SERPL-MCNC: 7.1 MG/DL — LOW (ref 8.4–10.5)
CALCIUM SERPL-MCNC: 7.3 MG/DL — LOW (ref 8.4–10.5)
CAST: 0 /LPF — SIGNIFICANT CHANGE UP (ref 0–4)
CHLORIDE SERPL-SCNC: 101 MMOL/L — SIGNIFICANT CHANGE UP (ref 96–108)
CHLORIDE SERPL-SCNC: 106 MMOL/L — SIGNIFICANT CHANGE UP (ref 96–108)
CO2 SERPL-SCNC: 17 MMOL/L — LOW (ref 22–29)
CO2 SERPL-SCNC: 18 MMOL/L — LOW (ref 22–29)
COLOR SPEC: YELLOW — SIGNIFICANT CHANGE UP
CREAT SERPL-MCNC: 1.17 MG/DL — SIGNIFICANT CHANGE UP (ref 0.5–1.3)
CREAT SERPL-MCNC: 1.2 MG/DL — SIGNIFICANT CHANGE UP (ref 0.5–1.3)
DIFF PNL FLD: NEGATIVE — SIGNIFICANT CHANGE UP
EGFR: 48 ML/MIN/1.73M2 — LOW
EGFR: 48 ML/MIN/1.73M2 — LOW
EGFR: 50 ML/MIN/1.73M2 — LOW
EGFR: 50 ML/MIN/1.73M2 — LOW
GLUCOSE BLDC GLUCOMTR-MCNC: 171 MG/DL — HIGH (ref 70–99)
GLUCOSE BLDC GLUCOMTR-MCNC: 208 MG/DL — HIGH (ref 70–99)
GLUCOSE BLDC GLUCOMTR-MCNC: 226 MG/DL — HIGH (ref 70–99)
GLUCOSE BLDC GLUCOMTR-MCNC: 272 MG/DL — HIGH (ref 70–99)
GLUCOSE SERPL-MCNC: 180 MG/DL — HIGH (ref 70–99)
GLUCOSE SERPL-MCNC: 277 MG/DL — HIGH (ref 70–99)
GLUCOSE UR QL: NEGATIVE MG/DL — SIGNIFICANT CHANGE UP
HCT VFR BLD CALC: 28.6 % — LOW (ref 34.5–45)
HGB BLD-MCNC: 9.3 G/DL — LOW (ref 11.5–15.5)
KETONES UR-MCNC: NEGATIVE MG/DL — SIGNIFICANT CHANGE UP
LEUKOCYTE ESTERASE UR-ACNC: ABNORMAL
MCHC RBC-ENTMCNC: 28.6 PG — SIGNIFICANT CHANGE UP (ref 27–34)
MCHC RBC-ENTMCNC: 32.5 GM/DL — SIGNIFICANT CHANGE UP (ref 32–36)
MCV RBC AUTO: 88 FL — SIGNIFICANT CHANGE UP (ref 80–100)
NITRITE UR-MCNC: NEGATIVE — SIGNIFICANT CHANGE UP
PH UR: 5 — SIGNIFICANT CHANGE UP (ref 5–8)
PLATELET # BLD AUTO: 337 K/UL — SIGNIFICANT CHANGE UP (ref 150–400)
POTASSIUM SERPL-MCNC: 4.9 MMOL/L — SIGNIFICANT CHANGE UP (ref 3.5–5.3)
POTASSIUM SERPL-MCNC: 5.5 MMOL/L — HIGH (ref 3.5–5.3)
POTASSIUM SERPL-SCNC: 4.9 MMOL/L — SIGNIFICANT CHANGE UP (ref 3.5–5.3)
POTASSIUM SERPL-SCNC: 5.5 MMOL/L — HIGH (ref 3.5–5.3)
PROT UR-MCNC: NEGATIVE MG/DL — SIGNIFICANT CHANGE UP
RAPID RVP RESULT: SIGNIFICANT CHANGE UP
RBC # BLD: 3.25 M/UL — LOW (ref 3.8–5.2)
RBC # FLD: 16.1 % — HIGH (ref 10.3–14.5)
RBC CASTS # UR COMP ASSIST: 1 /HPF — SIGNIFICANT CHANGE UP (ref 0–4)
SARS-COV-2 RNA SPEC QL NAA+PROBE: SIGNIFICANT CHANGE UP
SODIUM SERPL-SCNC: 135 MMOL/L — SIGNIFICANT CHANGE UP (ref 135–145)
SODIUM SERPL-SCNC: 137 MMOL/L — SIGNIFICANT CHANGE UP (ref 135–145)
SP GR SPEC: 1.01 — SIGNIFICANT CHANGE UP (ref 1–1.03)
SQUAMOUS # UR AUTO: 0 /HPF — SIGNIFICANT CHANGE UP (ref 0–5)
UROBILINOGEN FLD QL: 0.2 MG/DL — SIGNIFICANT CHANGE UP (ref 0.2–1)
WBC # BLD: 20.31 K/UL — HIGH (ref 3.8–10.5)
WBC # FLD AUTO: 20.31 K/UL — HIGH (ref 3.8–10.5)
WBC UR QL: 21 /HPF — HIGH (ref 0–5)

## 2024-07-11 PROCEDURE — 71045 X-RAY EXAM CHEST 1 VIEW: CPT | Mod: 26

## 2024-07-11 PROCEDURE — 99231 SBSQ HOSP IP/OBS SF/LOW 25: CPT

## 2024-07-11 RX ORDER — ACETAMINOPHEN 500 MG/5ML
650 LIQUID (ML) ORAL EVERY 6 HOURS
Refills: 0 | Status: DISCONTINUED | OUTPATIENT
Start: 2024-07-11 | End: 2024-07-13

## 2024-07-11 RX ORDER — SODIUM BICARBONATE 1 MEQ/ML
1300 SYRINGE (ML) INTRAVENOUS
Refills: 0 | Status: DISCONTINUED | OUTPATIENT
Start: 2024-07-11 | End: 2024-07-13

## 2024-07-11 RX ORDER — SODIUM ZIRCONIUM CYCLOSILICATE 5 G/5G
10 POWDER, FOR SUSPENSION ORAL ONCE
Refills: 0 | Status: COMPLETED | OUTPATIENT
Start: 2024-07-11 | End: 2024-07-11

## 2024-07-11 RX ADMIN — Medication 1300 MILLIGRAM(S): at 17:24

## 2024-07-11 RX ADMIN — INSULIN GLARGINE-YFGN 30 UNIT(S): 100 INJECTION, SOLUTION SUBCUTANEOUS at 22:31

## 2024-07-11 RX ADMIN — OXYCODONE HYDROCHLORIDE 5 MILLIGRAM(S): 30 TABLET ORAL at 08:23

## 2024-07-11 RX ADMIN — Medication 81 MILLIGRAM(S): at 13:44

## 2024-07-11 RX ADMIN — Medication 650 MILLIGRAM(S): at 08:23

## 2024-07-11 RX ADMIN — Medication 4 MILLIGRAM(S): at 17:25

## 2024-07-11 RX ADMIN — FUROSEMIDE 40 MILLIGRAM(S): 10 INJECTION INTRAMUSCULAR; INTRAVENOUS at 05:04

## 2024-07-11 RX ADMIN — OXYCODONE HYDROCHLORIDE 5 MILLIGRAM(S): 30 TABLET ORAL at 00:04

## 2024-07-11 RX ADMIN — GABAPENTIN 300 MILLIGRAM(S): 400 CAPSULE ORAL at 21:35

## 2024-07-11 RX ADMIN — Medication 4 MILLIGRAM(S): at 09:59

## 2024-07-11 RX ADMIN — SODIUM ZIRCONIUM CYCLOSILICATE 10 GRAM(S): 5 POWDER, FOR SUSPENSION ORAL at 08:17

## 2024-07-11 RX ADMIN — METOPROLOL SUCCINATE 100 MILLIGRAM(S): 50 TABLET, EXTENDED RELEASE ORAL at 05:04

## 2024-07-11 RX ADMIN — Medication 325 MILLIGRAM(S): at 05:04

## 2024-07-11 RX ADMIN — ATORVASTATIN CALCIUM 80 MILLIGRAM(S): 80 TABLET, FILM COATED ORAL at 21:35

## 2024-07-11 RX ADMIN — OXYCODONE HYDROCHLORIDE 5 MILLIGRAM(S): 30 TABLET ORAL at 00:40

## 2024-07-11 RX ADMIN — INSULIN LISPRO 1: 100 INJECTION, SOLUTION INTRAVENOUS; SUBCUTANEOUS at 09:17

## 2024-07-11 RX ADMIN — Medication 325 MILLIGRAM(S): at 21:36

## 2024-07-11 RX ADMIN — CLOPIDOGREL BISULFATE 75 MILLIGRAM(S): 75 TABLET, FILM COATED ORAL at 13:44

## 2024-07-11 RX ADMIN — Medication 40 MILLIGRAM(S): at 05:04

## 2024-07-11 RX ADMIN — ISOSORBIDE MONONITRATE 30 MILLIGRAM(S): 60 TABLET, EXTENDED RELEASE ORAL at 13:45

## 2024-07-11 RX ADMIN — INSULIN LISPRO 2: 100 INJECTION, SOLUTION INTRAVENOUS; SUBCUTANEOUS at 18:45

## 2024-07-11 RX ADMIN — INSULIN LISPRO 2: 100 INJECTION, SOLUTION INTRAVENOUS; SUBCUTANEOUS at 13:46

## 2024-07-11 RX ADMIN — AMLODIPINE BESYLATE 10 MILLIGRAM(S): 10 TABLET ORAL at 05:04

## 2024-07-12 ENCOUNTER — TRANSCRIPTION ENCOUNTER (OUTPATIENT)
Age: 74
End: 2024-07-12

## 2024-07-12 LAB
ANION GAP SERPL CALC-SCNC: 14 MMOL/L — SIGNIFICANT CHANGE UP (ref 5–17)
BASOPHILS # BLD AUTO: 0.04 K/UL — SIGNIFICANT CHANGE UP (ref 0–0.2)
BASOPHILS NFR BLD AUTO: 0.3 % — SIGNIFICANT CHANGE UP (ref 0–2)
BUN SERPL-MCNC: 25.4 MG/DL — HIGH (ref 8–20)
CALCIUM SERPL-MCNC: 7.1 MG/DL — LOW (ref 8.4–10.5)
CHLORIDE SERPL-SCNC: 102 MMOL/L — SIGNIFICANT CHANGE UP (ref 96–108)
CO2 SERPL-SCNC: 22 MMOL/L — SIGNIFICANT CHANGE UP (ref 22–29)
CREAT SERPL-MCNC: 1.07 MG/DL — SIGNIFICANT CHANGE UP (ref 0.5–1.3)
EGFR: 56 ML/MIN/1.73M2 — LOW
EGFR: 56 ML/MIN/1.73M2 — LOW
EOSINOPHIL # BLD AUTO: 0.15 K/UL — SIGNIFICANT CHANGE UP (ref 0–0.5)
EOSINOPHIL NFR BLD AUTO: 0.9 % — SIGNIFICANT CHANGE UP (ref 0–6)
GLUCOSE BLDC GLUCOMTR-MCNC: 173 MG/DL — HIGH (ref 70–99)
GLUCOSE BLDC GLUCOMTR-MCNC: 180 MG/DL — HIGH (ref 70–99)
GLUCOSE BLDC GLUCOMTR-MCNC: 226 MG/DL — HIGH (ref 70–99)
GLUCOSE BLDC GLUCOMTR-MCNC: 270 MG/DL — HIGH (ref 70–99)
GLUCOSE SERPL-MCNC: 182 MG/DL — HIGH (ref 70–99)
HCT VFR BLD CALC: 27.1 % — LOW (ref 34.5–45)
HGB BLD-MCNC: 8.8 G/DL — LOW (ref 11.5–15.5)
IMM GRANULOCYTES NFR BLD AUTO: 0.9 % — SIGNIFICANT CHANGE UP (ref 0–0.9)
LYMPHOCYTES # BLD AUTO: 16.2 % — SIGNIFICANT CHANGE UP (ref 13–44)
LYMPHOCYTES # BLD AUTO: 2.59 K/UL — SIGNIFICANT CHANGE UP (ref 1–3.3)
MAGNESIUM SERPL-MCNC: 2 MG/DL — SIGNIFICANT CHANGE UP (ref 1.6–2.6)
MCHC RBC-ENTMCNC: 28.3 PG — SIGNIFICANT CHANGE UP (ref 27–34)
MCHC RBC-ENTMCNC: 32.5 GM/DL — SIGNIFICANT CHANGE UP (ref 32–36)
MCV RBC AUTO: 87.1 FL — SIGNIFICANT CHANGE UP (ref 80–100)
MONOCYTES # BLD AUTO: 1.44 K/UL — HIGH (ref 0–0.9)
MONOCYTES NFR BLD AUTO: 9 % — SIGNIFICANT CHANGE UP (ref 2–14)
MRSA PCR RESULT.: SIGNIFICANT CHANGE UP
NEUTROPHILS # BLD AUTO: 11.59 K/UL — HIGH (ref 1.8–7.4)
NEUTROPHILS NFR BLD AUTO: 72.7 % — SIGNIFICANT CHANGE UP (ref 43–77)
PHOSPHATE SERPL-MCNC: 1.8 MG/DL — LOW (ref 2.4–4.7)
PLATELET # BLD AUTO: 332 K/UL — SIGNIFICANT CHANGE UP (ref 150–400)
POTASSIUM SERPL-MCNC: 4.3 MMOL/L — SIGNIFICANT CHANGE UP (ref 3.5–5.3)
POTASSIUM SERPL-SCNC: 4.3 MMOL/L — SIGNIFICANT CHANGE UP (ref 3.5–5.3)
RBC # BLD: 3.11 M/UL — LOW (ref 3.8–5.2)
RBC # FLD: 16.1 % — HIGH (ref 10.3–14.5)
S AUREUS DNA NOSE QL NAA+PROBE: SIGNIFICANT CHANGE UP
SODIUM SERPL-SCNC: 137 MMOL/L — SIGNIFICANT CHANGE UP (ref 135–145)
WBC # BLD: 15.96 K/UL — HIGH (ref 3.8–10.5)
WBC # FLD AUTO: 15.96 K/UL — HIGH (ref 3.8–10.5)

## 2024-07-12 PROCEDURE — 99231 SBSQ HOSP IP/OBS SF/LOW 25: CPT

## 2024-07-12 PROCEDURE — 71046 X-RAY EXAM CHEST 2 VIEWS: CPT | Mod: 26

## 2024-07-12 RX ORDER — CEFTRIAXONE 500 MG/1
1000 INJECTION, POWDER, FOR SOLUTION INTRAMUSCULAR; INTRAVENOUS EVERY 24 HOURS
Refills: 0 | Status: DISCONTINUED | OUTPATIENT
Start: 2024-07-12 | End: 2024-07-12

## 2024-07-12 RX ORDER — SULFAMETHOXAZOLE/TRIMETHOPRIM 800-160 MG
1 TABLET ORAL EVERY 12 HOURS
Refills: 0 | Status: DISCONTINUED | OUTPATIENT
Start: 2024-07-12 | End: 2024-07-13

## 2024-07-12 RX ORDER — CEFTRIAXONE 500 MG/1
1000 INJECTION, POWDER, FOR SOLUTION INTRAMUSCULAR; INTRAVENOUS ONCE
Refills: 0 | Status: COMPLETED | OUTPATIENT
Start: 2024-07-12 | End: 2024-07-12

## 2024-07-12 RX ORDER — INSULIN GLARGINE 100 [IU]/ML
30 INJECTION, SOLUTION SUBCUTANEOUS
Refills: 0 | DISCHARGE

## 2024-07-12 RX ORDER — SULFAMETHOXAZOLE/TRIMETHOPRIM 800-160 MG
1 TABLET ORAL
Qty: 0 | Refills: 0 | DISCHARGE
Start: 2024-07-12

## 2024-07-12 RX ORDER — CEFTRIAXONE 500 MG/1
INJECTION, POWDER, FOR SOLUTION INTRAMUSCULAR; INTRAVENOUS
Refills: 0 | Status: DISCONTINUED | OUTPATIENT
Start: 2024-07-12 | End: 2024-07-12

## 2024-07-12 RX ORDER — SODIUM PHOSPHATE,DIBASIC DIHYD
30 POWDER (GRAM) MISCELLANEOUS ONCE
Refills: 0 | Status: DISCONTINUED | OUTPATIENT
Start: 2024-07-12 | End: 2024-07-13

## 2024-07-12 RX ORDER — CALCIUM GLUCONATE 20 MG/ML
1 INJECTION, SOLUTION INTRAVENOUS ONCE
Refills: 0 | Status: DISCONTINUED | OUTPATIENT
Start: 2024-07-12 | End: 2024-07-13

## 2024-07-12 RX ADMIN — Medication 81 MILLIGRAM(S): at 13:21

## 2024-07-12 RX ADMIN — INSULIN LISPRO 1: 100 INJECTION, SOLUTION INTRAVENOUS; SUBCUTANEOUS at 09:43

## 2024-07-12 RX ADMIN — Medication 1 TABLET(S): at 17:50

## 2024-07-12 RX ADMIN — Medication 1300 MILLIGRAM(S): at 05:45

## 2024-07-12 RX ADMIN — OXYCODONE HYDROCHLORIDE 5 MILLIGRAM(S): 30 TABLET ORAL at 13:19

## 2024-07-12 RX ADMIN — CLOPIDOGREL BISULFATE 75 MILLIGRAM(S): 75 TABLET, FILM COATED ORAL at 13:21

## 2024-07-12 RX ADMIN — ATORVASTATIN CALCIUM 80 MILLIGRAM(S): 80 TABLET, FILM COATED ORAL at 21:25

## 2024-07-12 RX ADMIN — Medication 325 MILLIGRAM(S): at 21:25

## 2024-07-12 RX ADMIN — INSULIN LISPRO 3: 100 INJECTION, SOLUTION INTRAVENOUS; SUBCUTANEOUS at 13:21

## 2024-07-12 RX ADMIN — INSULIN LISPRO 2: 100 INJECTION, SOLUTION INTRAVENOUS; SUBCUTANEOUS at 17:51

## 2024-07-12 RX ADMIN — OXYCODONE HYDROCHLORIDE 5 MILLIGRAM(S): 30 TABLET ORAL at 12:30

## 2024-07-12 RX ADMIN — ISOSORBIDE MONONITRATE 30 MILLIGRAM(S): 60 TABLET, EXTENDED RELEASE ORAL at 13:21

## 2024-07-12 RX ADMIN — INSULIN GLARGINE-YFGN 30 UNIT(S): 100 INJECTION, SOLUTION SUBCUTANEOUS at 21:25

## 2024-07-12 RX ADMIN — Medication 325 MILLIGRAM(S): at 05:44

## 2024-07-12 RX ADMIN — Medication 325 MILLIGRAM(S): at 13:21

## 2024-07-12 RX ADMIN — FUROSEMIDE 40 MILLIGRAM(S): 10 INJECTION INTRAMUSCULAR; INTRAVENOUS at 05:44

## 2024-07-12 RX ADMIN — METOPROLOL SUCCINATE 100 MILLIGRAM(S): 50 TABLET, EXTENDED RELEASE ORAL at 05:44

## 2024-07-12 RX ADMIN — GABAPENTIN 300 MILLIGRAM(S): 400 CAPSULE ORAL at 21:25

## 2024-07-12 RX ADMIN — Medication 1300 MILLIGRAM(S): at 17:50

## 2024-07-12 RX ADMIN — Medication 650 MILLIGRAM(S): at 06:22

## 2024-07-12 RX ADMIN — CEFTRIAXONE 1000 MILLIGRAM(S): 500 INJECTION, POWDER, FOR SOLUTION INTRAMUSCULAR; INTRAVENOUS at 08:32

## 2024-07-12 RX ADMIN — Medication 40 MILLIGRAM(S): at 06:24

## 2024-07-12 RX ADMIN — AMLODIPINE BESYLATE 10 MILLIGRAM(S): 10 TABLET ORAL at 05:44

## 2024-07-13 VITALS
DIASTOLIC BLOOD PRESSURE: 69 MMHG | RESPIRATION RATE: 18 BRPM | TEMPERATURE: 98 F | HEART RATE: 65 BPM | SYSTOLIC BLOOD PRESSURE: 151 MMHG | OXYGEN SATURATION: 93 %

## 2024-07-13 LAB
ANION GAP SERPL CALC-SCNC: 15 MMOL/L — SIGNIFICANT CHANGE UP (ref 5–17)
BASOPHILS # BLD AUTO: 0.03 K/UL — SIGNIFICANT CHANGE UP (ref 0–0.2)
BASOPHILS NFR BLD AUTO: 0.2 % — SIGNIFICANT CHANGE UP (ref 0–2)
BUN SERPL-MCNC: 23.3 MG/DL — HIGH (ref 8–20)
CALCIUM SERPL-MCNC: 6.6 MG/DL — LOW (ref 8.4–10.5)
CHLORIDE SERPL-SCNC: 101 MMOL/L — SIGNIFICANT CHANGE UP (ref 96–108)
CO2 SERPL-SCNC: 19 MMOL/L — LOW (ref 22–29)
CREAT SERPL-MCNC: 1.02 MG/DL — SIGNIFICANT CHANGE UP (ref 0.5–1.3)
EGFR: 59 ML/MIN/1.73M2 — LOW
EGFR: 59 ML/MIN/1.73M2 — LOW
EOSINOPHIL # BLD AUTO: 0.12 K/UL — SIGNIFICANT CHANGE UP (ref 0–0.5)
EOSINOPHIL NFR BLD AUTO: 0.9 % — SIGNIFICANT CHANGE UP (ref 0–6)
GLUCOSE BLDC GLUCOMTR-MCNC: 171 MG/DL — HIGH (ref 70–99)
GLUCOSE BLDC GLUCOMTR-MCNC: 245 MG/DL — HIGH (ref 70–99)
GLUCOSE SERPL-MCNC: 188 MG/DL — HIGH (ref 70–99)
HCT VFR BLD CALC: 28.8 % — LOW (ref 34.5–45)
HGB BLD-MCNC: 9.3 G/DL — LOW (ref 11.5–15.5)
IMM GRANULOCYTES NFR BLD AUTO: 0.6 % — SIGNIFICANT CHANGE UP (ref 0–0.9)
LYMPHOCYTES # BLD AUTO: 15.3 % — SIGNIFICANT CHANGE UP (ref 13–44)
LYMPHOCYTES # BLD AUTO: 2.15 K/UL — SIGNIFICANT CHANGE UP (ref 1–3.3)
MAGNESIUM SERPL-MCNC: 2.1 MG/DL — SIGNIFICANT CHANGE UP (ref 1.8–2.6)
MCHC RBC-ENTMCNC: 28.1 PG — SIGNIFICANT CHANGE UP (ref 27–34)
MCHC RBC-ENTMCNC: 32.3 GM/DL — SIGNIFICANT CHANGE UP (ref 32–36)
MCV RBC AUTO: 87 FL — SIGNIFICANT CHANGE UP (ref 80–100)
MONOCYTES # BLD AUTO: 1.35 K/UL — HIGH (ref 0–0.9)
MONOCYTES NFR BLD AUTO: 9.6 % — SIGNIFICANT CHANGE UP (ref 2–14)
NEUTROPHILS # BLD AUTO: 10.28 K/UL — HIGH (ref 1.8–7.4)
NEUTROPHILS NFR BLD AUTO: 73.4 % — SIGNIFICANT CHANGE UP (ref 43–77)
PHOSPHATE SERPL-MCNC: 1.7 MG/DL — LOW (ref 2.4–4.7)
PLATELET # BLD AUTO: 354 K/UL — SIGNIFICANT CHANGE UP (ref 150–400)
POTASSIUM SERPL-MCNC: 4.1 MMOL/L — SIGNIFICANT CHANGE UP (ref 3.5–5.3)
POTASSIUM SERPL-SCNC: 4.1 MMOL/L — SIGNIFICANT CHANGE UP (ref 3.5–5.3)
RBC # BLD: 3.31 M/UL — LOW (ref 3.8–5.2)
RBC # FLD: 15.7 % — HIGH (ref 10.3–14.5)
SODIUM SERPL-SCNC: 135 MMOL/L — SIGNIFICANT CHANGE UP (ref 135–145)
WBC # BLD: 14.01 K/UL — HIGH (ref 3.8–10.5)
WBC # FLD AUTO: 14.01 K/UL — HIGH (ref 3.8–10.5)

## 2024-07-13 PROCEDURE — C1769: CPT

## 2024-07-13 PROCEDURE — 71045 X-RAY EXAM CHEST 1 VIEW: CPT

## 2024-07-13 PROCEDURE — 87640 STAPH A DNA AMP PROBE: CPT

## 2024-07-13 PROCEDURE — 85025 COMPLETE CBC W/AUTO DIFF WBC: CPT

## 2024-07-13 PROCEDURE — 80048 BASIC METABOLIC PNL TOTAL CA: CPT

## 2024-07-13 PROCEDURE — 82962 GLUCOSE BLOOD TEST: CPT

## 2024-07-13 PROCEDURE — 86900 BLOOD TYPING SEROLOGIC ABO: CPT

## 2024-07-13 PROCEDURE — 85027 COMPLETE CBC AUTOMATED: CPT

## 2024-07-13 PROCEDURE — 84100 ASSAY OF PHOSPHORUS: CPT

## 2024-07-13 PROCEDURE — 36415 COLL VENOUS BLD VENIPUNCTURE: CPT

## 2024-07-13 PROCEDURE — 83735 ASSAY OF MAGNESIUM: CPT

## 2024-07-13 PROCEDURE — 71046 X-RAY EXAM CHEST 2 VIEWS: CPT

## 2024-07-13 PROCEDURE — 84484 ASSAY OF TROPONIN QUANT: CPT

## 2024-07-13 PROCEDURE — C1887: CPT

## 2024-07-13 PROCEDURE — 87641 MR-STAPH DNA AMP PROBE: CPT

## 2024-07-13 PROCEDURE — 81001 URINALYSIS AUTO W/SCOPE: CPT

## 2024-07-13 PROCEDURE — C1894: CPT

## 2024-07-13 PROCEDURE — C1760: CPT

## 2024-07-13 PROCEDURE — 86901 BLOOD TYPING SEROLOGIC RH(D): CPT

## 2024-07-13 PROCEDURE — 93005 ELECTROCARDIOGRAM TRACING: CPT

## 2024-07-13 PROCEDURE — 86850 RBC ANTIBODY SCREEN: CPT

## 2024-07-13 PROCEDURE — 0225U NFCT DS DNA&RNA 21 SARSCOV2: CPT

## 2024-07-13 RX ORDER — SULFAMETHOXAZOLE/TRIMETHOPRIM 800-160 MG
1 TABLET ORAL
Qty: 10 | Refills: 0
Start: 2024-07-13 | End: 2024-07-17

## 2024-07-13 RX ORDER — ACETAMINOPHEN 500 MG/5ML
1 LIQUID (ML) ORAL
Qty: 28 | Refills: 0
Start: 2024-07-13 | End: 2024-07-19

## 2024-07-13 RX ORDER — INSULIN GLARGINE-YFGN 100 [IU]/ML
30 INJECTION, SOLUTION SUBCUTANEOUS
Qty: 0 | Refills: 0 | DISCHARGE
Start: 2024-07-13

## 2024-07-13 RX ORDER — ACETAMINOPHEN 325 MG
1 TABLET ORAL
Qty: 0 | Refills: 0 | DISCHARGE

## 2024-07-13 RX ORDER — SODIUM PHOSPHATE,DIBASIC DIHYD
30 POWDER (GRAM) MISCELLANEOUS ONCE
Refills: 0 | Status: COMPLETED | OUTPATIENT
Start: 2024-07-13 | End: 2024-07-13

## 2024-07-13 RX ADMIN — Medication 85 MILLIMOLE(S): at 11:57

## 2024-07-13 RX ADMIN — Medication 40 MILLIGRAM(S): at 05:06

## 2024-07-13 RX ADMIN — CLOPIDOGREL BISULFATE 75 MILLIGRAM(S): 75 TABLET, FILM COATED ORAL at 16:54

## 2024-07-13 RX ADMIN — Medication 81 MILLIGRAM(S): at 16:53

## 2024-07-13 RX ADMIN — Medication 1300 MILLIGRAM(S): at 05:06

## 2024-07-13 RX ADMIN — INSULIN LISPRO 2: 100 INJECTION, SOLUTION INTRAVENOUS; SUBCUTANEOUS at 12:58

## 2024-07-13 RX ADMIN — Medication 1 APPLICATION(S): at 05:06

## 2024-07-13 RX ADMIN — Medication 325 MILLIGRAM(S): at 16:54

## 2024-07-13 RX ADMIN — OXYCODONE HYDROCHLORIDE 5 MILLIGRAM(S): 30 TABLET ORAL at 09:46

## 2024-07-13 RX ADMIN — AMLODIPINE BESYLATE 10 MILLIGRAM(S): 10 TABLET ORAL at 05:06

## 2024-07-13 RX ADMIN — OXYCODONE HYDROCHLORIDE 5 MILLIGRAM(S): 30 TABLET ORAL at 10:16

## 2024-07-13 RX ADMIN — ISOSORBIDE MONONITRATE 30 MILLIGRAM(S): 60 TABLET, EXTENDED RELEASE ORAL at 16:54

## 2024-07-13 RX ADMIN — METOPROLOL SUCCINATE 100 MILLIGRAM(S): 50 TABLET, EXTENDED RELEASE ORAL at 05:06

## 2024-07-13 RX ADMIN — Medication 1 TABLET(S): at 16:55

## 2024-07-13 RX ADMIN — Medication 325 MILLIGRAM(S): at 05:06

## 2024-07-13 RX ADMIN — INSULIN LISPRO 1: 100 INJECTION, SOLUTION INTRAVENOUS; SUBCUTANEOUS at 09:48

## 2024-07-13 RX ADMIN — FUROSEMIDE 40 MILLIGRAM(S): 10 INJECTION INTRAMUSCULAR; INTRAVENOUS at 05:06

## 2024-07-13 RX ADMIN — Medication 1 TABLET(S): at 05:05

## 2024-07-14 RX ORDER — FUROSEMIDE 10 MG/ML
1 INJECTION INTRAMUSCULAR; INTRAVENOUS
Refills: 0
Start: 2024-07-14

## 2024-07-14 RX ORDER — FUROSEMIDE 10 MG/ML
1 INJECTION INTRAMUSCULAR; INTRAVENOUS
Qty: 30 | Refills: 0
Start: 2024-07-14

## 2024-07-14 RX ORDER — FUROSEMIDE 10 MG/ML
1 INJECTION INTRAMUSCULAR; INTRAVENOUS
Qty: 30 | Refills: 0
Start: 2024-07-14 | End: 2024-08-12

## 2024-07-15 ENCOUNTER — INPATIENT (INPATIENT)
Facility: HOSPITAL | Age: 74
LOS: 1 days | Discharge: HOME | DRG: 392 | End: 2024-07-17
Attending: STUDENT IN AN ORGANIZED HEALTH CARE EDUCATION/TRAINING PROGRAM | Admitting: HOSPITALIST
Payer: MEDICARE

## 2024-07-15 ENCOUNTER — APPOINTMENT (OUTPATIENT)
Dept: CARDIOLOGY | Facility: CLINIC | Age: 74
End: 2024-07-15

## 2024-07-15 VITALS
HEIGHT: 59 IN | RESPIRATION RATE: 20 BRPM | WEIGHT: 179.9 LBS | TEMPERATURE: 99 F | SYSTOLIC BLOOD PRESSURE: 114 MMHG | DIASTOLIC BLOOD PRESSURE: 63 MMHG | OXYGEN SATURATION: 97 % | HEART RATE: 65 BPM

## 2024-07-15 DIAGNOSIS — Z95.0 PRESENCE OF CARDIAC PACEMAKER: Chronic | ICD-10-CM

## 2024-07-15 DIAGNOSIS — Z86.011 PERSONAL HISTORY OF BENIGN NEOPLASM OF THE BRAIN: Chronic | ICD-10-CM

## 2024-07-15 DIAGNOSIS — K52.9 NONINFECTIVE GASTROENTERITIS AND COLITIS, UNSPECIFIED: ICD-10-CM

## 2024-07-15 DIAGNOSIS — H26.9 UNSPECIFIED CATARACT: Chronic | ICD-10-CM

## 2024-07-15 DIAGNOSIS — Z98.890 OTHER SPECIFIED POSTPROCEDURAL STATES: Chronic | ICD-10-CM

## 2024-07-15 LAB
ALBUMIN SERPL ELPH-MCNC: 3.3 G/DL — SIGNIFICANT CHANGE UP (ref 3.3–5.2)
ALP SERPL-CCNC: 355 U/L — HIGH (ref 40–120)
ALT FLD-CCNC: 60 U/L — HIGH
ANION GAP SERPL CALC-SCNC: 18 MMOL/L — HIGH (ref 5–17)
APTT BLD: 33.7 SEC — SIGNIFICANT CHANGE UP (ref 24.5–35.6)
AST SERPL-CCNC: 29 U/L — SIGNIFICANT CHANGE UP
BASOPHILS # BLD AUTO: 0.05 K/UL — SIGNIFICANT CHANGE UP (ref 0–0.2)
BASOPHILS NFR BLD AUTO: 0.5 % — SIGNIFICANT CHANGE UP (ref 0–2)
BILIRUB SERPL-MCNC: 0.4 MG/DL — SIGNIFICANT CHANGE UP (ref 0.4–2)
BUN SERPL-MCNC: 14.4 MG/DL — SIGNIFICANT CHANGE UP (ref 8–20)
C DIFF BY PCR RESULT: SIGNIFICANT CHANGE UP
CALCIUM SERPL-MCNC: 6.7 MG/DL — LOW (ref 8.4–10.5)
CHLORIDE SERPL-SCNC: 95 MMOL/L — LOW (ref 96–108)
CO2 SERPL-SCNC: 20 MMOL/L — LOW (ref 22–29)
CREAT SERPL-MCNC: 1.33 MG/DL — HIGH (ref 0.5–1.3)
EC EAEA GENE STL QL NAA+PROBE: DETECTED
EGFR: 43 ML/MIN/1.73M2 — LOW
EOSINOPHIL # BLD AUTO: 0.29 K/UL — SIGNIFICANT CHANGE UP (ref 0–0.5)
EOSINOPHIL NFR BLD AUTO: 2.8 % — SIGNIFICANT CHANGE UP (ref 0–6)
GI PCR PANEL: DETECTED
GLUCOSE SERPL-MCNC: 150 MG/DL — HIGH (ref 70–99)
HCT VFR BLD CALC: 28.8 % — LOW (ref 34.5–45)
HGB BLD-MCNC: 9.6 G/DL — LOW (ref 11.5–15.5)
IMM GRANULOCYTES NFR BLD AUTO: 0.7 % — SIGNIFICANT CHANGE UP (ref 0–0.9)
INR BLD: 1.08 RATIO — SIGNIFICANT CHANGE UP (ref 0.85–1.18)
LIDOCAIN IGE QN: 32 U/L — SIGNIFICANT CHANGE UP (ref 22–51)
LYMPHOCYTES # BLD AUTO: 1.7 K/UL — SIGNIFICANT CHANGE UP (ref 1–3.3)
LYMPHOCYTES # BLD AUTO: 16.2 % — SIGNIFICANT CHANGE UP (ref 13–44)
MCHC RBC-ENTMCNC: 28 PG — SIGNIFICANT CHANGE UP (ref 27–34)
MCHC RBC-ENTMCNC: 33.3 GM/DL — SIGNIFICANT CHANGE UP (ref 32–36)
MCV RBC AUTO: 84 FL — SIGNIFICANT CHANGE UP (ref 80–100)
MONOCYTES # BLD AUTO: 0.98 K/UL — HIGH (ref 0–0.9)
MONOCYTES NFR BLD AUTO: 9.3 % — SIGNIFICANT CHANGE UP (ref 2–14)
NEUTROPHILS # BLD AUTO: 7.4 K/UL — SIGNIFICANT CHANGE UP (ref 1.8–7.4)
NEUTROPHILS NFR BLD AUTO: 70.5 % — SIGNIFICANT CHANGE UP (ref 43–77)
PLATELET # BLD AUTO: 461 K/UL — HIGH (ref 150–400)
POTASSIUM SERPL-MCNC: 3.7 MMOL/L — SIGNIFICANT CHANGE UP (ref 3.5–5.3)
POTASSIUM SERPL-SCNC: 3.7 MMOL/L — SIGNIFICANT CHANGE UP (ref 3.5–5.3)
PROT SERPL-MCNC: 6.7 G/DL — SIGNIFICANT CHANGE UP (ref 6.6–8.7)
PROTHROM AB SERPL-ACNC: 12 SEC — SIGNIFICANT CHANGE UP (ref 9.5–13)
RBC # BLD: 3.43 M/UL — LOW (ref 3.8–5.2)
RBC # FLD: 15.8 % — HIGH (ref 10.3–14.5)
SODIUM SERPL-SCNC: 133 MMOL/L — LOW (ref 135–145)
WBC # BLD: 10.49 K/UL — SIGNIFICANT CHANGE UP (ref 3.8–10.5)
WBC # FLD AUTO: 10.49 K/UL — SIGNIFICANT CHANGE UP (ref 3.8–10.5)

## 2024-07-15 PROCEDURE — 99223 1ST HOSP IP/OBS HIGH 75: CPT

## 2024-07-15 PROCEDURE — 76705 ECHO EXAM OF ABDOMEN: CPT | Mod: 26

## 2024-07-15 PROCEDURE — 99497 ADVNCD CARE PLAN 30 MIN: CPT | Mod: 25

## 2024-07-15 PROCEDURE — 99285 EMERGENCY DEPT VISIT HI MDM: CPT

## 2024-07-15 PROCEDURE — 74177 CT ABD & PELVIS W/CONTRAST: CPT | Mod: 26,MC

## 2024-07-15 RX ORDER — ONDANSETRON HYDROCHLORIDE 2 MG/ML
4 INJECTION INTRAMUSCULAR; INTRAVENOUS EVERY 6 HOURS
Refills: 0 | Status: DISCONTINUED | OUTPATIENT
Start: 2024-07-15 | End: 2024-07-17

## 2024-07-15 RX ORDER — DEXTROSE MONOHYDRATE AND SODIUM CHLORIDE 5; .3 G/100ML; G/100ML
1000 INJECTION, SOLUTION INTRAVENOUS
Refills: 0 | Status: DISCONTINUED | OUTPATIENT
Start: 2024-07-15 | End: 2024-07-15

## 2024-07-15 RX ORDER — SODIUM CHLORIDE 0.9 % (FLUSH) 0.9 %
500 SYRINGE (ML) INJECTION ONCE
Refills: 0 | Status: COMPLETED | OUTPATIENT
Start: 2024-07-15 | End: 2024-07-15

## 2024-07-15 RX ORDER — ACETAMINOPHEN 325 MG
650 TABLET ORAL EVERY 6 HOURS
Refills: 0 | Status: DISCONTINUED | OUTPATIENT
Start: 2024-07-15 | End: 2024-07-17

## 2024-07-15 RX ORDER — CIPROFLOXACIN HCL 500 MG
400 TABLET ORAL ONCE
Refills: 0 | Status: COMPLETED | OUTPATIENT
Start: 2024-07-15 | End: 2024-07-15

## 2024-07-15 RX ADMIN — Medication 1000 MILLILITER(S): at 23:34

## 2024-07-15 RX ADMIN — Medication 200 MILLIGRAM(S): at 23:34

## 2024-07-15 NOTE — ED PROVIDER NOTE - NSFOLLOWUPINSTRUCTIONS_ED_ALL_ED_FT
Colitis    WHAT YOU NEED TO KNOW:    Colitis is swelling and irritation of your colon. Colitis may be caused by ulcers or a problem with your immune system. Bacteria, a virus, or a parasite may also cause colitis. The cause may not be known. You may have diarrhea, abdominal pain, fever, or blood or mucus in your bowel movement.    DISCHARGE INSTRUCTIONS:    Return to the emergency department if:    You have sudden trouble breathing.    Your bowel movements are black or have blood in them.    You have blood in your vomit.    You have severe abdominal pain or your abdomen is swollen and feels hard.    You have any of the following signs of dehydration:  Dizziness or weakness    Dry mouth, cracked lips, or severe thirst    Fast heartbeat or breathing    Urinating very little or not at all  Call your doctor if:    Your symptoms get worse or do not go away.    You have a fever, chills, cough, or feel weak and achy.    You suddenly lose weight without trying.    You have questions or concerns about your condition or care.  Medicines:    Medicines may be given to decrease inflammation in your colon and treat diarrhea.    Take your medicine as directed. Contact your healthcare provider if you think your medicine is not helping or if you have side effects. Tell your provider if you are allergic to any medicine. Keep a list of the medicines, vitamins, and herbs you take. Include the amounts, and when and why you take them. Bring the list or the pill bottles to follow-up visits. Carry your medicine list with you in case of an emergency.  Manage your symptoms:    Drink liquids as directed to help prevent dehydration. Good liquids to drink include water, juice, and broth. Ask how much liquid to drink each day. You may need to drink an oral rehydration solution (ORS). An ORS contains a balance of water, salt, and sugar to replace body fluids lost during diarrhea.    Eat a variety of healthy foods. Healthy foods include fruits, vegetables, whole-grain breads, beans, low-fat dairy products, lean meats, and fish. You may need to eat several small meals throughout the day instead of large meals. Avoid spicy foods, caffeine, chocolate, and foods high in fat.    Talk to your healthcare provider before you take NSAIDs. NSAIDs can cause worsen your symptoms if ulcers are causing your colitis.    Start to exercise when you feel better. Regular exercise helps your bowels work normally. Ask about the best exercise plan for you.  Prevent the spread of germs:      Wash your hands often. Wash your hands several times each day. Wash after you use the bathroom, change a child's diaper, and before you prepare or eat food. Use soap and water every time. Rub your soapy hands together, lacing your fingers. Wash the front and back of your hands, and in between your fingers. Use the fingers of one hand to scrub under the fingernails of the other hand. Wash for at least 20 seconds. Rinse with warm, running water for several seconds. Then dry your hands with a clean towel or paper towel. Use hand  that contains alcohol if soap and water are not available. Do not touch your eyes, nose, or mouth without washing your hands first.  Handwashing      Cover a sneeze or cough. Use a tissue that covers your mouth and nose. Throw the tissue away in a trash can right away. Use the bend of your arm if a tissue is not available. Wash your hands well with soap and water or use a hand .    Clean surfaces often. Clean doorknobs, countertops, cell phones, and other surfaces that are touched often. Use a disinfecting wipe, a single-use sponge, or a cloth you can wash and reuse. Use disinfecting  if you do not have wipes. You can create a disinfecting  by mixing 1 part bleach with 10 parts water.    Ask about vaccines you may need. Vaccines help prevent disease caused by some viruses and bacteria. Get the influenza (flu) vaccine as soon as recommended each year. The flu vaccine is usually available starting in September or October. Flu viruses change, so it is important to get a flu vaccine every year. Get the pneumonia vaccine if recommended. This vaccine is usually recommended every 5 years. Your provider will tell you when to get this vaccine, if needed. Your healthcare provider can tell you if you should get other vaccines, and when to get them.  Follow up with your doctor as directed: You may need to return for a colonoscopy or other tests. Write down how often you have a bowel movements and what they look like. Bring this to your follow-up visits. Write down your questions so you remember to ask them during your visits. Colitis    LO QUE NECESITAS SABER:    La colitis es hinchazón e irritación del colon. La colitis puede ser causada por úlceras o un problema con johnson sistema inmunológico. Las bacterias, un virus o un parásito también pueden causar colitis. Es posible que se desconozca la causa. Es posible que tenga diarrea, dolor abdominal, fiebre o windy o mucosidad en las heces.    INSTRUCCIONES DE DESCARGA:    Regrese al departamento de emergencias si:    Tiene problemas repentinos para respirar.    Kaelyn evacuaciones intestinales son negras o tienen windy.    Tienes windy en el vómito.    Tiene dolor abdominal intenso o johnson abdomen está hinchado y se siente larry.    Tiene alguno de los siguientes signos de deshidratación:  Mareos o debilidad    Boca seca, labios agrietados o sed intensa.    Latidos cardíacos rápidos o respiración    Orinar muy poco o nada  Llame a johnson médico si:    Kaelyn síntomas empeoran o no desaparecen.    Tiene fiebre, escalofríos, tos o se siente débil y dolorido.    De repente pierdes peso sin intentarlo.    Tiene preguntas o inquietudes sobre johnson condición o atención.  Medicamentos:    Es posible que le administren medicamentos para disminuir la inflamación del colon y tratar la diarrea.    [place] johnson medicamento según las indicaciones. Comuníquese con johnson proveedor de atención médica si mirlande que johnson medicamento no le está ayudando o si tiene efectos secundarios. Informe a johnson proveedor si es alérgico a algún medicamento. Mantenga gonzalo lista de los medicamentos, vitaminas y hierbas que crista. Incluya las cantidades y cuándo y por qué las crista. Lleve la lista o los frascos de pastillas a las visitas de seguimiento. Lleve consigo johnson lista de medicamentos en darcie de gonzalo emergencia.  Maneje kaelyn síntomas:    Ronit líquidos según las indicaciones para ayudar a prevenir la deshidratación. Los buenos líquidos para beber incluyen agua, jugo y caldo. Pregunte cuánto líquido debe beber cada día. Es posible que necesite beber gonzalo solución de rehidratación oral (SRO). Gonzalo SRO contiene un equilibrio de agua, sal y azúcar para reemplazar los fluidos corporales perdidos angeline la diarrea.    Consuma gonzalo variedad de alimentos saludables. Los alimentos saludables incluyen frutas, verduras, panes integrales, frijoles, productos lácteos bajos en grasa, alden magras y pescado. Es posible que necesite realizar varias comidas pequeñas a lo jaden del día en lugar de comidas abundantes. Evite las comidas picantes, la cafeína, el chocolate y los alimentos ricos en grasas.    Hable con johnson proveedor de atención médica antes de lucina PURNIMA. Los PURNIMA pueden empeorar kaelyn síntomas si las úlceras están causando johnson colitis.    Empiece a hacer ejercicio cuando se sienta mejor. El ejercicio regular ayuda a que kaelyn intestinos funcionen normalmente. Pregunta por el mejor plan de ejercicios para ti.  Prevenga la propagación de gérmenes:      Lávese las aly con frecuencia. Lávese las aly varias veces al día. Lávese después de ir al baño, cambiarle el pañal al ankur y antes de preparar o comer alimentos. Utilice agua y jabón cada vez. Frótese las aly con jabón, entrelazando los dedos. Lávese el frente y el dorso de las aly y entre los dedos. Utilice los dedos de gonzalo mano para frotar debajo de las uñas de la otra. Garrett angeline al menos 20 segundos. Enjuague con agua corriente tibia angeline varios segundos. Luego sécate las aly con gonzalo toalla limpia o gonzalo toalla de papel. Utilice un desinfectante para aly que contenga alcohol si no hay agua y jabón disponibles. No te toques los ojos, la nariz o la boca sin antes lavarte las aly.  Lavarse las aly      Cubrirse al estornudar o toser. Utilice un pañuelo que le cubra la boca y la nariz. Tire el pañuelo a la basura de inmediato. Utilice la curva de johnson brazo si no hay un pañuelo disponible. Lávese angelina las aly con agua y jabón o use un desinfectante para aly.    Limpie las superficies con frecuencia. Limpie los pomos de las agnieszka, las encimeras, los teléfonos móviles y otras superficies que se tocan con frecuencia. Utilice gonzalo toallita desinfectante, gonzalo esponja de un solo uso o un paño que pueda garrett y reutilizar. Utilice limpiadores desinfectantes si no tiene toallitas. Puedes crear un limpiador desinfectante mezclando 1 parte de lejía con 10 partes de agua.    Pregunte sobre las vacunas que pueda necesitar. Las vacunas ayudan a prevenir enfermedades causadas por algunos virus y bacterias. Obtenga la vacuna contra la influenza (gripe) tan pronto khang se recomienda cada año. La vacuna contra la gripe suele estar disponible a partir de septiembre u octubre. Los virus de la gripe cambian, por lo que es importante vacunarse contra la gripe todos los años. Reciba la vacuna contra la neumonía si se lo recomiendan. Esta vacuna suele recomendarse cada 5 años. Johnson proveedor le dirá cuándo debe recibir esta vacuna, si es necesario. Johnson proveedor de atención médica puede decirle si debe recibir otras vacunas y cuándo hacerlo.  Anika gonzalo bishop de seguimiento con johnson médico según las indicaciones: Es posible que deba regresar para gonzalo colonoscopia u otras pruebas. Anote la frecuencia con la que defeca y cómo se michelle. Lleve esto a kaelyn visitas de seguimiento. Escriba kaelyn preguntas para recordar hacerlas angeline kaelyn visitas.          Colitis    WHAT YOU NEED TO KNOW:    Colitis is swelling and irritation of your colon. Colitis may be caused by ulcers or a problem with your immune system. Bacteria, a virus, or a parasite may also cause colitis. The cause may not be known. You may have diarrhea, abdominal pain, fever, or blood or mucus in your bowel movement.    DISCHARGE INSTRUCTIONS:    Return to the emergency department if:    You have sudden trouble breathing.    Your bowel movements are black or have blood in them.    You have blood in your vomit.    You have severe abdominal pain or your abdomen is swollen and feels hard.    You have any of the following signs of dehydration:  Dizziness or weakness    Dry mouth, cracked lips, or severe thirst    Fast heartbeat or breathing    Urinating very little or not at all  Call your doctor if:    Your symptoms get worse or do not go away.    You have a fever, chills, cough, or feel weak and achy.    You suddenly lose weight without trying.    You have questions or concerns about your condition or care.  Medicines:    Medicines may be given to decrease inflammation in your colon and treat diarrhea.    Take your medicine as directed. Contact your healthcare provider if you think your medicine is not helping or if you have side effects. Tell your provider if you are allergic to any medicine. Keep a list of the medicines, vitamins, and herbs you take. Include the amounts, and when and why you take them. Bring the list or the pill bottles to follow-up visits. Carry your medicine list with you in case of an emergency.  Manage your symptoms:    Drink liquids as directed to help prevent dehydration. Good liquids to drink include water, juice, and broth. Ask how much liquid to drink each day. You may need to drink an oral rehydration solution (ORS). An ORS contains a balance of water, salt, and sugar to replace body fluids lost during diarrhea.    Eat a variety of healthy foods. Healthy foods include fruits, vegetables, whole-grain breads, beans, low-fat dairy products, lean meats, and fish. You may need to eat several small meals throughout the day instead of large meals. Avoid spicy foods, caffeine, chocolate, and foods high in fat.    Talk to your healthcare provider before you take NSAIDs. NSAIDs can cause worsen your symptoms if ulcers are causing your colitis.    Start to exercise when you feel better. Regular exercise helps your bowels work normally. Ask about the best exercise plan for you.  Prevent the spread of germs:      Wash your hands often. Wash your hands several times each day. Wash after you use the bathroom, change a child's diaper, and before you prepare or eat food. Use soap and water every time. Rub your soapy hands together, lacing your fingers. Wash the front and back of your hands, and in between your fingers. Use the fingers of one hand to scrub under the fingernails of the other hand. Wash for at least 20 seconds. Rinse with warm, running water for several seconds. Then dry your hands with a clean towel or paper towel. Use hand  that contains alcohol if soap and water are not available. Do not touch your eyes, nose, or mouth without washing your hands first.  Handwashing      Cover a sneeze or cough. Use a tissue that covers your mouth and nose. Throw the tissue away in a trash can right away. Use the bend of your arm if a tissue is not available. Wash your hands well with soap and water or use a hand .    Clean surfaces often. Clean doorknobs, countertops, cell phones, and other surfaces that are touched often. Use a disinfecting wipe, a single-use sponge, or a cloth you can wash and reuse. Use disinfecting  if you do not have wipes. You can create a disinfecting  by mixing 1 part bleach with 10 parts water.    Ask about vaccines you may need. Vaccines help prevent disease caused by some viruses and bacteria. Get the influenza (flu) vaccine as soon as recommended each year. The flu vaccine is usually available starting in September or October. Flu viruses change, so it is important to get a flu vaccine every year. Get the pneumonia vaccine if recommended. This vaccine is usually recommended every 5 years. Your provider will tell you when to get this vaccine, if needed. Your healthcare provider can tell you if you should get other vaccines, and when to get them.  Follow up with your doctor as directed: You may need to return for a colonoscopy or other tests. Write down how often you have a bowel movements and what they look like. Bring this to your follow-up visits. Write down your questions so you remember to ask them during your visits.

## 2024-07-15 NOTE — ED ADULT NURSE NOTE - OBJECTIVE STATEMENT
AxOx4. Patient c/o abdominal pain and diarrhea this morning. Patient states she feels like her stomach is twisted. Abd soft, non tender, non distended. Patient s/p wound vac placed yesterday. US guided IV placed, labs obtained. MD at bedside for evaluation.

## 2024-07-15 NOTE — ED PROVIDER NOTE - ATTENDING CONTRIBUTION TO CARE
Seen with resident.  Patient with multiple bouts of abdominal pain and crampy diarrhea since being discharged from the hospital for vascular procedure.  While in the emergency department, patient has had at least 8 episodes of watery diarrhea.  On exam, abdomen is softly distended, with generalized tenderness, but no rebound or guarding.  Patient with a wound VAC on her right lower extremity from recent vascular procedure.  Patient ambulatory and able to get the bathroom.  Possible dry oral mucosa.  CT and laboratory results are reviewed.  Stool studies acknowledged, will start Cipro and IV hydration.  Due to mild BRADY appreciated on labs along with age, comorbidities, and burden of diarrheal frequency, discussed with hospitalist for admission.

## 2024-07-15 NOTE — ED PROVIDER NOTE - CLINICAL SUMMARY MEDICAL DECISION MAKING FREE TEXT BOX
73F with PMHx w DM, htn, PAD, POD5 wound debridement and CTA runoffs on RLE shin presenting with abdominal pain and lack of appetite since being discharged from the hospital 2 days ago for her surgery c/b UTI. She has had brown and black diarrhea constantly through the day and night (pt on iron supplements) and RLQ abdominal pain. States she can keep down water. Denies n/v.    Possible appendicitis , diverticulitis, or gastroenteritis  Ordered CT abd/pelv w contrast -- can help distinguish area of etiology  no fever, chills, n/v, so less likely to be infectious etiology  lipase ordered for pancreatitis r/o 73F with PMHx w DM, htn, PAD, POD5 wound debridement and CTA runoffs on RLE shin presenting with abdominal pain and lack of appetite since being discharged from the hospital 2 days ago for her surgery c/b UTI. She has had brown and black diarrhea constantly through the day and night (pt on iron supplements) and RLQ abdominal pain. States she can keep down water. Denies n/v.    Possible appendicitis , diverticulitis, or gastroenteritis  Ordered CT abd/pelv w contrast -- can help distinguish area of etiology  no fever, chills, n/v, so less likely to be infectious etiology  lipase ordered for pancreatitis r/o  could be C diff as pt is on Bactrim course; C diff and GI PCR ordered 73F with PMHx w DM, htn, PAD, POD5 wound debridement and CTA runoffs on RLE shin presenting with abdominal pain and lack of appetite since being discharged from the hospital 2 days ago for her surgery c/b UTI. She has had brown and black diarrhea constantly through the day and night (pt on iron supplements) and RLQ abdominal pain. States she can keep down water. Denies n/v.    Possible appendicitis , diverticulitis, or gastroenteritis  Ordered CT abd/pelv w contrast -- can help distinguish area of etiology  no fever, chills, n/v, so less likely to be infectious etiology  lipase ordered for pancreatitis r/o  could be C diff as pt is on Bactrim course; C diff and GI PCR ordered    CT abd/pelv shows possible vj; gallstones present and possible wall thickening  US of abdomen ordered 73F with PMHx w DM, htn, PAD, POD5 wound debridement and CTA runoffs on RLE shin presenting with abdominal pain and lack of appetite since being discharged from the hospital 2 days ago for her surgery c/b UTI. She has had brown and black diarrhea constantly through the day and night (pt on iron supplements) and RLQ abdominal pain. States she can keep down water. Denies n/v.    Possible appendicitis , diverticulitis, or gastroenteritis  Ordered CT abd/pelv w contrast -- can help distinguish area of etiology  no fever, chills, n/v, so less likely to be infectious etiology  lipase ordered for pancreatitis r/o  could be C diff as pt is on Bactrim course; C diff and GI PCR ordered    CT abd/pelv shows possible vj; gallstones present and possible wall thickening  US of abdomen ordered'      most likely differential is colitis as patient presents with diarrhea and abd pain; CT shows colitis

## 2024-07-15 NOTE — H&P ADULT - TIME BILLING
chart review, patient interview, chart documentation.  Plan discussed with patient and ED RN.    Dispo: medically active, pending improvement in above GI symptoms, resolution of BRADY and electrolyte abnormality.

## 2024-07-15 NOTE — ED PROVIDER NOTE - PHYSICAL EXAMINATION
General: NAD, well appearing, A&Ox3  HEAD: Normocephalic, atraumatic  EYES: PERRLA, extraocular movements intact  Neck: No apparent stiffness, JVD, lymphadenopathy.  Pulm: Chest wall symmetric and nontender, lungs clear to ascultation. No wheezing, rales, rhonchi.  Cardiac: Regular rate and regular rhythm, S1/S2 present.   Abdomen: Tender, particularly in the RLQ, soft, nondistended. No rebound, guarding.  Genitourinary: No CVA tenderness, no suprapubic tenderness.  Skin: Warm, dry and intact without rashes or lesions.  Back: No midline tenderness.  Neuro: Alert, speech clear, no focal deficits  MSK: Symmetric pulses, no peripheral edema  PSYCH: normal mood/affect, normal insight.

## 2024-07-15 NOTE — ED PROVIDER NOTE - NSICDXPASTMEDICALHX_GEN_ALL_CORE_FT
PAST MEDICAL HISTORY:  Cardiac pacemaker [employer] for mobitz type 11    CVA (cerebrovascular accident) resdiual right sided weakness    CVA (cerebrovascular accident)     DM (diabetes mellitus)     H/O gastroesophageal reflux (GERD)     HTN (hypertension)     PAD (peripheral artery disease)     Wound of right leg

## 2024-07-15 NOTE — ED PROVIDER NOTE - PROGRESS NOTE DETAILS
CT abd/pelv shows possible vj; gallstones present and possible wall thickening  US of abdomen ordered

## 2024-07-15 NOTE — H&P ADULT - CONVERSATION DETAILS
Pt desires aggressive measures including CPR, mechanical ventilation in event of cardiac arrest. Pt is full code.

## 2024-07-15 NOTE — H&P ADULT - NSICDXFAMILYHX_GEN_ALL_CORE_FT
I spoke to son, Christiano.  He stated that he stopped his blood pressure medicine and it has been fine.  Home health is going out today.  Asked if he wanted an appointment.  Christiano stated that he is ok to wait a week.  Patient has an appointment next week.   FAMILY HISTORY:  FH: asthma, Son

## 2024-07-15 NOTE — H&P ADULT - ASSESSMENT
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 diff negative   -s/p cipro given by ED, will continue  -Clear liquid diet, advance as tolerated   -Not reporting any bloody diarrhea, will resume DAPT for CAD/PAD hx    Elevated LFTs  -Elevated alk phos noted, probably related to gallbladder sludge  -Seen by surgery re: ? cholecystitis on imaging, deemed not to have active cholecystitis     BRADY, hyponatremia, hypocalcemia   -Mild, due to GI losses  -Corrected Ca 7.3, will replete w/ IV calcium gluconate  -s/p NS 500cc bolus given by ED  -NS at 75cc/hr x14hr, then re-assess  -Transfer to telemetry for hypocalcemia  -Hold diuretic     Anemia  -Chronic, either due to AOCD or iron deficiency   -Hgb around baseline of 9, monitor  -Resume ferrous sulfate    Hx HFpEF (55-60%)  -Likely hypovolemic from ongoing GI losses  -CT notes small b/l pleural effusions, not hypoxic or in respiratory distress  -Monitor volume status carefully while on IVF as above  -Order CXR to further assess pleural effusion  -Hold furosemide     Chronic RLE wound s/p debridement   -Has wound vac present   -Spoke w/ surgical resident overnight, plan to change wound van in AM    Hx CAD, PAD, HTN  -ASA, Plavix, atorvastatin, isosorbide, metoprolol, amlodipine    Medication management  -Pt unsure of meds, utilized meds from recent discharge  -Unclear if pt is on insulin, please call pharmacy in AM    Prophylactic measure   -Heparin 5000 units BID 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 diff negative   -s/p cipro given by ED, will continue  -Clear liquid diet, advance as tolerated   -Not reporting any bloody diarrhea, will resume DAPT for CAD/PAD hx    Elevated LFTs  -Elevated alk phos noted, probably related to gallbladder sludge  -Seen by surgery re: ? cholecystitis on imaging, deemed not to have active cholecystitis     BRADY, hyponatremia, hypocalcemia   -Mild, due to GI losses  -Corrected Ca 7.3, will replete w/ IV calcium gluconate  -s/p NS 500cc bolus given by ED  -NS at 75cc/hr x14hr, then re-assess  -Transfer to telemetry for hypocalcemia  -Hold diuretic     Anemia  -Chronic, either due to AOCD or iron deficiency   -Hgb around baseline of 9, monitor  -Resume ferrous sulfate    Hx HFpEF (55-60%)  -Likely hypovolemic from ongoing GI losses  -CT notes small b/l pleural effusions, not hypoxic or in respiratory distress  -Monitor volume status carefully while on IVF as above  -Order CXR to further assess pleural effusion  -Hold furosemide     Chronic RLE wound s/p debridement   -Has wound vac present   -Spoke w/ surgical resident overnight, plan to change wound van in AM    Hx CAD, PAD, HTN  -ASA, Plavix, atorvastatin, isosorbide, metoprolol, amlodipine    Hx DM  -Unclear if pt is on insulin, not on d/c summary but a basal insulin was added 7/13  -Currently euglycemia & w/ reduced oral intake  -Start ISS for now    Medication management  -Pt unsure of meds, utilized meds from recent discharge  -Unclear if pt is on insulin, please call pharmacy in AM    Prophylactic measure   -Heparin 5000 units BID

## 2024-07-15 NOTE — CONSULT NOTE ADULT - SUBJECTIVE AND OBJECTIVE BOX
SURGERY CONSULT  ==============================================================================================================  HPI: 73y Female with PM of DM, HTN, CVA, CAD, and recent RLE angio and wound debridement, discharged 2 days ago with home wound vac presents to the ED complaining of lower abdominal pain and diarrhea with decreased appetite. Patient denies fever or chills. Denies RUQ abdominal pain, nausea or vomiting.   Surgery consulted for r/o cholecystitis.      PAST MEDICAL & SURGICAL HISTORY:  DM (diabetes mellitus)      HTN (hypertension)      H/O gastroesophageal reflux (GERD)      Cardiac pacemaker  MI[employer] for mobitz type 11      CVA (cerebrovascular accident)  resdiual right sided weakness      CVA (cerebrovascular accident)      PAD (peripheral artery disease)      Wound of right leg      History of benign brain tumor      Cataract      History of biopsy      Cardiac pacemaker      S/P angiogram of extremity        Home Meds: Home Medications:  amLODIPine 10 mg oral tablet: 1 tab(s) orally once a day (09 Jul 2024 11:07)  clopidogrel 75 mg oral tablet: 1 tab(s) orally once a day (09 Jul 2024 11:07)  ferrous sulfate 325 mg (65 mg elemental iron) oral tablet: 1 tab(s) orally 3 times a day (10 Jul 2024 10:21)  furosemide 40 mg oral tablet: 1 tab(s) orally once a day (10 Jul 2024 10:21)  insulin glargine 100 units/mL subcutaneous solution: 30 unit(s) subcutaneous once a day (at bedtime) (13 Jul 2024 19:24)  isosorbide mononitrate 30 mg oral tablet, extended release: 1 tab(s) orally once a day (09 Jul 2024 11:07)  pantoprazole 40 mg oral delayed release tablet: 1 tab(s) orally once a day (09 Jul 2024 11:07)    Allergies: Allergies    No Known Allergies    Intolerances      Soc:   Advanced Directives: Presumed Full Code     CURRENT MEDICATIONS:   --------------------------------------------------------------------------------------  Neurologic Medications    Respiratory Medications    Cardiovascular Medications    Gastrointestinal Medications    Genitourinary Medications    Hematologic/Oncologic Medications    Antimicrobial/Immunologic Medications    Endocrine/Metabolic Medications    Topical/Other Medications    --------------------------------------------------------------------------------------    VITAL SIGNS, INS/OUTS (last 24 hours):  --------------------------------------------------------------------------------------  ICU Vital Signs Last 24 Hrs  T(C): 37 (15 Jul 2024 13:05), Max: 37 (15 Jul 2024 13:05)  T(F): 98.6 (15 Jul 2024 13:05), Max: 98.6 (15 Jul 2024 13:05)  HR: 65 (15 Jul 2024 13:05) (65 - 65)  BP: 114/63 (15 Jul 2024 13:05) (114/63 - 114/63)  BP(mean): --  ABP: --  ABP(mean): --  RR: 20 (15 Jul 2024 13:05) (20 - 20)  SpO2: 97% (15 Jul 2024 13:05) (97% - 97%)    O2 Parameters below as of 15 Jul 2024 13:05  Patient On (Oxygen Delivery Method): room air      I&O's Summary  --------------------------------------------------------------------------------------    PHYSICAL EXAM:  GENERAL: NAD  HEAD:  Atraumatic, Normocephalic  EYES: EOMI, PERRLA, conjunctiva and sclera clear  NECK: Supple, No JVD  CHEST/LUNG: non-labored breathing on room air.   HEART: Regular rate and rhythm  ABDOMEN: Soft, Nondistended, lower abdominal tenderness without rebound or guarding. no RUQ tenderness, negative Cruz's sign.       LABS  --------------------------------------------------------------------------------------  Labs:  CAPILLARY BLOOD GLUCOSE                      LFTs:         Coags:                  --------------------------------------------------------------------------------------

## 2024-07-15 NOTE — H&P ADULT - NSHPLABSRESULTS_GEN_ALL_CORE
07-15    133<L>  |  95<L>  |  14.4  ----------------------------<  150<H>  3.7   |  20.0<L>  |  1.33<H>    Ca    6.7<L>      15 Jul 2024 18:50    TPro  6.7  /  Alb  3.3  /  TBili  0.4  /  DBili  x   /  AST  29  /  ALT  60<H>  /  AlkPhos  355<H>  07-15                            9.6    10.49 )-----------( 461      ( 15 Jul 2024 18:50 )             28.8 07-15    133<L>  |  95<L>  |  14.4  ----------------------------<  150<H>  3.7   |  20.0<L>  |  1.33<H>    Ca    6.7<L>      15 Jul 2024 18:50    TPro  6.7  /  Alb  3.3  /  TBili  0.4  /  DBili  x   /  AST  29  /  ALT  60<H>  /  AlkPhos  355<H>  07-15                            9.6    10.49 )-----------( 461      ( 15 Jul 2024 18:50 )             28.8      EKG- v paced rhythm, HR 68

## 2024-07-15 NOTE — H&P ADULT - NSGCTIMESPENTATTENDAPP_GEN_A_CORE
Minoxidil Counseling: Minoxidil is a topical medication which can increase blood flow where it is applied. It is uncertain how this medication increases hair growth. Side effects are uncommon and include stinging and allergic reactions. Simponi Counseling:  I discussed with the patient the risks of golimumab including but not limited to myelosuppression, immunosuppression, autoimmune hepatitis, demyelinating diseases, lymphoma, and serious infections.  The patient understands that monitoring is required including a PPD at baseline and must alert us or the primary physician if symptoms of infection or other concerning signs are noted. Odomzo Counseling- I discussed with the patient the risks of Odomzo including but not limited to nausea, vomiting, diarrhea, constipation, weight loss, changes in the sense of taste, decreased appetite, muscle spasms, and hair loss.  The patient verbalized understanding of the proper use and possible adverse effects of Odomzo.  All of the patient's questions and concerns were addressed. Sarecycline Pregnancy And Lactation Text: This medication is Pregnancy Category D and not consider safe during pregnancy. It is also excreted in breast milk. Elidel Counseling: Patient may experience a mild burning sensation during topical application. Elidel is not approved in children less than 2 years of age. There have been case reports of hematologic and skin malignancies in patients using topical calcineurin inhibitors although causality is questionable. Carac Counseling:  I discussed with the patient the risks of Carac including but not limited to erythema, scaling, itching, weeping, crusting, and pain. Cephalexin Counseling: I counseled the patient regarding use of cephalexin as an antibiotic for prophylactic and/or therapeutic purposes. Cephalexin (commonly prescribed under brand name Keflex) is a cephalosporin antibiotic which is active against numerous classes of bacteria, including most skin bacteria. Side effects may include nausea, diarrhea, gastrointestinal upset, rash, hives, yeast infections, and in rare cases, hepatitis, kidney disease, seizures, fever, confusion, neurologic symptoms, and others. Patients with severe allergies to penicillin medications are cautioned that there is about a 10% incidence of cross-reactivity with cephalosporins. When possible, patients with penicillin allergies should use alternatives to cephalosporins for antibiotic therapy. Klisyri Counseling:  I discussed with the patient the risks of Klisyri including but not limited to erythema, scaling, itching, weeping, crusting, and pain. Topical Ketoconazole Pregnancy And Lactation Text: This medication is Pregnancy Category B and is considered safe during pregnancy. It is unknown if it is excreted in breast milk. Ilumya Counseling: I discussed with the patient the risks of tildrakizumab including but not limited to immunosuppression, malignancy, posterior leukoencephalopathy syndrome, and serious infections.  The patient understands that monitoring is required including a PPD at baseline and must alert us or the primary physician if symptoms of infection or other concerning signs are noted. Hydroxychloroquine Counseling:  I discussed with the patient that a baseline ophthalmologic exam is needed at the start of therapy and every year thereafter while on therapy. A CBC may also be warranted for monitoring.  The side effects of this medication were discussed with the patient, including but not limited to agranulocytosis, aplastic anemia, seizures, rashes, retinopathy, and liver toxicity. Patient instructed to call the office should any adverse effect occur.  The patient verbalized understanding of the proper use and possible adverse effects of Plaquenil.  All the patient's questions and concerns were addressed. 16 Ketoconazole Pregnancy And Lactation Text: This medication is Pregnancy Category C and it isn't know if it is safe during pregnancy. It is also excreted in breast milk and breast feeding isn't recommended. Cimzia Counseling:  I discussed with the patient the risks of Cimzia including but not limited to immunosuppression, allergic reactions and infections.  The patient understands that monitoring is required including a PPD at baseline and must alert us or the primary physician if symptoms of infection or other concerning signs are noted. Metronidazole Pregnancy And Lactation Text: This medication is Pregnancy Category B and considered safe during pregnancy.  It is also excreted in breast milk. Colchicine Pregnancy And Lactation Text: This medication is Pregnancy Category C and isn't considered safe during pregnancy. It is excreted in breast milk. Bexarotene Pregnancy And Lactation Text: This medication is Pregnancy Category X and should not be given to women who are pregnant or may become pregnant. This medication should not be used if you are breast feeding. Soolantra Pregnancy And Lactation Text: This medication is Pregnancy Category C. This medication is considered safe during breast feeding. Cyclophosphamide Counseling:  I discussed with the patient the risks of cyclophosphamide including but not limited to hair loss, hormonal abnormalities, decreased fertility, abdominal pain, diarrhea, nausea and vomiting, bone marrow suppression and infection. The patient understands that monitoring is required while taking this medication. Winlevi Counseling:  I discussed with the patient the risks of topical clascoterone including but not limited to erythema, scaling, itching, and stinging. Patient voiced their understanding. Odomzo Pregnancy And Lactation Text: This medication is Pregnancy Category X and is absolutely contraindicated during pregnancy. It is unknown if it is excreted in breast milk. Rinvoq Pregnancy And Lactation Text: Based on animal studies, Rinvoq may cause embryo-fetal harm when administered to pregnant women.  The medication should not be used in pregnancy.  Breastfeeding is not recommended during treatment and for 6 days after the last dose. Tranexamic Acid Pregnancy And Lactation Text: It is unknown if this medication is safe during pregnancy or breast feeding. Hydroxyzine Pregnancy And Lactation Text: This medication is not safe during pregnancy and should not be taken. It is also excreted in breast milk and breast feeding isn't recommended. Protopic Pregnancy And Lactation Text: This medication is Pregnancy Category C. It is unknown if this medication is excreted in breast milk when applied topically. Tremfya Pregnancy And Lactation Text: The risk during pregnancy and breastfeeding is uncertain with this medication. Elidel Pregnancy And Lactation Text: This medication is Pregnancy Category C. It is unknown if this medication is excreted in breast milk. Minoxidil Pregnancy And Lactation Text: This medication has not been assigned a Pregnancy Risk Category but animal studies failed to show danger with the topical medication. It is unknown if the medication is excreted in breast milk. Opioid Counseling: I discussed with the patient the potential side effects of opioids including but not limited to addiction, altered mental status, and depression. I stressed avoiding alcohol, benzodiazepines, muscle relaxants and sleep aids unless specifically okayed by a physician. The patient verbalized understanding of the proper use and possible adverse effects of opioids. All of the patient's questions and concerns were addressed. They were instructed to flush the remaining pills down the toilet if they did not need them for pain. Topical Metronidazole Counseling: Metronidazole is a topical antibiotic medication. You may experience burning, stinging, redness, or allergic reactions.  Please call our office if you develop any problems from using this medication. Hydroxychloroquine Pregnancy And Lactation Text: This medication has been shown to cause fetal harm but it isn't assigned a Pregnancy Risk Category. There are small amounts excreted in breast milk. Cibinqo Counseling: I discussed with the patient the risks of Cibinqo therapy including but not limited to common cold, nausea, headache, cold sores, increased blood CPK levels, dizziness, UTIs, fatigue, acne, and vomitting. Live vaccines should be avoided.  This medication has been linked to serious infections; higher rate of mortality; malignancy and lymphoproliferative disorders; major adverse cardiovascular events; thrombosis; thrombocytopenia and lymphopenia; lipid elevations; and retinal detachment. Sotyktu Counseling:  I discussed the most common side effects of Sotyktu including: common cold, sore throat, sinus infections, cold sores, canker sores, folliculitis, and acne.? I also discussed more serious side effects of Sotyktu including but not limited to: serious allergic reactions; increased risk for infections such as TB; cancers such as lymphomas; rhabdomyolysis and elevated CPK; and elevated triglycerides and liver enzymes.? Birth Control Pills Pregnancy And Lactation Text: This medication should be avoided if pregnant and for the first 30 days post-partum. Tetracycline Counseling: Patient counseled regarding possible photosensitivity and increased risk for sunburn.  Patient instructed to avoid sunlight, if possible.  When exposed to sunlight, patients should wear protective clothing, sunglasses, and sunscreen.  The patient was instructed to call the office immediately if the following severe adverse effects occur:  hearing changes, easy bruising/bleeding, severe headache, or vision changes.  The patient verbalized understanding of the proper use and possible adverse effects of tetracycline.  All of the patient's questions and concerns were addressed. Patient understands to avoid pregnancy while on therapy due to potential birth defects. Cephalexin Pregnancy And Lactation Text: This medication is Pregnancy Category B and considered safe during pregnancy.  It is also excreted in breast milk but can be used safely for shorter doses. Klisyri Pregnancy And Lactation Text: It is unknown if this medication can harm a developing fetus or if it is excreted in breast milk. Topical Retinoid counseling:  Patient advised to apply a pea-sized amount only at bedtime and wait 30 minutes after washing their face before applying.  If too drying, patient may add a non-comedogenic moisturizer. The patient verbalized understanding of the proper use and possible adverse effects of retinoids.  All of the patient's questions and concerns were addressed. Nsaids Pregnancy And Lactation Text: These medications are considered safe up to 30 weeks gestation. It is excreted in breast milk. Cimzia Pregnancy And Lactation Text: This medication crosses the placenta but can be considered safe in certain situations. Cimzia may be excreted in breast milk. Terbinafine Counseling: Patient counseling regarding adverse effects of terbinafine including but not limited to headache, diarrhea, rash, upset stomach, liver function test abnormalities, itching, taste/smell disturbance, nausea, abdominal pain, and flatulence.  There is a rare possibility of liver failure that can occur when taking terbinafine.  The patient understands that a baseline LFT and kidney function test may be required. The patient verbalized understanding of the proper use and possible adverse effects of terbinafine.  All of the patient's questions and concerns were addressed. Propranolol Counseling:  I discussed with the patient the risks of propranolol including but not limited to low heart rate, low blood pressure, low blood sugar, restlessness and increased cold sensitivity. They should call the office if they experience any of these side effects. Dapsone Counseling: I discussed with the patient the risks of dapsone including but not limited to hemolytic anemia, agranulocytosis, rashes, methemoglobinemia, kidney failure, peripheral neuropathy, headaches, GI upset, and liver toxicity.  Patients who start dapsone require monitoring including baseline LFTs and weekly CBCs for the first month, then every month thereafter.  The patient verbalized understanding of the proper use and possible adverse effects of dapsone.  All of the patient's questions and concerns were addressed. Isotretinoin Counseling: Patient should get monthly blood tests, not donate blood, not drive at night if vision affected, not share medication, and not undergo elective surgery for 6 months after tx completed. Side effects reviewed, pt to contact office should one occur. Skyrizi Counseling: I discussed with the patient the risks of risankizumab-rzaa including but not limited to immunosuppression, and serious infections.  The patient understands that monitoring is required including a PPD at baseline and must alert us or the primary physician if symptoms of infection or other concerning signs are noted. Winlevi Pregnancy And Lactation Text: This medication is considered safe during pregnancy and breastfeeding. Carac Pregnancy And Lactation Text: This medication is Pregnancy Category X and contraindicated in pregnancy and in women who may become pregnant. It is unknown if this medication is excreted in breast milk. Mirvaso Counseling: Mirvaso is a topical medication which can decrease superficial blood flow where applied. Side effects are uncommon and include stinging, redness and allergic reactions. Valtrex Counseling: I discussed with the patient the risks of valacyclovir including but not limited to kidney damage, nausea, vomiting and severe allergy.  The patient understands that if the infection seems to be worsening or is not improving, they are to call. Minocycline Counseling: Patient advised regarding possible photosensitivity and discoloration of the teeth, skin, lips, tongue and gums.  Patient instructed to avoid sunlight, if possible.  When exposed to sunlight, patients should wear protective clothing, sunglasses, and sunscreen.  The patient was instructed to call the office immediately if the following severe adverse effects occur:  hearing changes, easy bruising/bleeding, severe headache, or vision changes.  The patient verbalized understanding of the proper use and possible adverse effects of minocycline.  All of the patient's questions and concerns were addressed. Qbrexza Counseling:  I discussed with the patient the risks of Qbrexza including but not limited to headache, mydriasis, blurred vision, dry eyes, nasal dryness, dry mouth, dry throat, dry skin, urinary hesitation, and constipation.  Local skin reactions including erythema, burning, stinging, and itching can also occur. Xolair Counseling:  Patient informed of potential adverse effects including but not limited to fever, muscle aches, rash and allergic reactions.  The patient verbalized understanding of the proper use and possible adverse effects of Xolair.  All of the patient's questions and concerns were addressed. Topical Metronidazole Pregnancy And Lactation Text: This medication is Pregnancy Category B and considered safe during pregnancy.  It is also considered safe to use while breastfeeding. Opioid Pregnancy And Lactation Text: These medications can lead to premature delivery and should be avoided during pregnancy. These medications are also present in breast milk in small amounts. Calcipotriene Counseling:  I discussed with the patient the risks of calcipotriene including but not limited to erythema, scaling, itching, and irritation. Eucrisa Counseling: Patient may experience a mild burning sensation during topical application. Eucrisa is not approved in children less than 2 years of age. Valtrex Pregnancy And Lactation Text: this medication is Pregnancy Category B and is considered safe during pregnancy. This medication is not directly found in breast milk but it's metabolite acyclovir is present. Spironolactone Counseling: Patient advised regarding risks of diarrhea, abdominal pain, hyperkalemia, birth defects (for female patients), liver toxicity and renal toxicity. The patient may need blood work to monitor liver and kidney function and potassium levels while on therapy. The patient verbalized understanding of the proper use and possible adverse effects of spironolactone.  All of the patient's questions and concerns were addressed. Cibinqo Pregnancy And Lactation Text: It is unknown if this medication will adversely affect pregnancy or breast feeding.  You should not take this medication if you are currently pregnant or planning a pregnancy or while breastfeeding. Sotyktu Pregnancy And Lactation Text: There is insufficient data to evaluate whether or not Sotyktu is safe to use during pregnancy.? ?It is not known if Sotyktu passes into breast milk and whether or not it is safe to use when breastfeeding.?? Cyclophosphamide Pregnancy And Lactation Text: This medication is Pregnancy Category D and it isn't considered safe during pregnancy. This medication is excreted in breast milk. Clindamycin Counseling: I counseled the patient regarding use of clindamycin as an antibiotic for prophylactic and/or therapeutic purposes. Clindamycin is active against numerous classes of bacteria, including skin bacteria. Side effects may include nausea, diarrhea, gastrointestinal upset, rash, hives, yeast infections, and in rare cases, colitis. Dutasteride Male Counseling: Dustasteride Counseling:  I discussed with the patient the risks of use of dutasteride including but not limited to decreased libido, decreased ejaculate volume, and gynecomastia. Women who can become pregnant should not handle medication.  All of the patient's questions and concerns were addressed. Dapsone Pregnancy And Lactation Text: This medication is Pregnancy Category C and is not considered safe during pregnancy or breast feeding. Infliximab Counseling:  I discussed with the patient the risks of infliximab including but not limited to myelosuppression, immunosuppression, autoimmune hepatitis, demyelinating diseases, lymphoma, and serious infections.  The patient understands that monitoring is required including a PPD at baseline and must alert us or the primary physician if symptoms of infection or other concerning signs are noted. Albendazole Counseling:  I discussed with the patient the risks of albendazole including but not limited to cytopenia, kidney damage, nausea/vomiting and severe allergy.  The patient understands that this medication is being used in an off-label manner. Low Dose Naltrexone Counseling- I discussed with the patient the potential risks and side effects of low dose naltrexone including but not limited to: more vivid dreams, headaches, nausea, vomiting, abdominal pain, fatigue, dizziness, and anxiety. Olanzapine Counseling- I discussed with the patient the common side effects of olanzapine including but are not limited to: lack of energy, dry mouth, increased appetite, sleepiness, tremor, constipation, dizziness, changes in behavior, or restlessness.  Explained that teenagers are more likely to experience headaches, abdominal pain, pain in the arms or legs, tiredness, and sleepiness.  Serious side effects include but are not limited: increased risk of death in elderly patients who are confused, have memory loss, or dementia-related psychosis; hyperglycemia; increased cholesterol and triglycerides; and weight gain. Cosentyx Counseling:  I discussed with the patient the risks of Cosentyx including but not limited to worsening of Crohn's disease, immunosuppression, allergic reactions and infections.  The patient understands that monitoring is required including a PPD at baseline and must alert us or the primary physician if symptoms of infection or other concerning signs are noted. Propranolol Pregnancy And Lactation Text: This medication is Pregnancy Category C and it isn't known if it is safe during pregnancy. It is excreted in breast milk. Isotretinoin Pregnancy And Lactation Text: This medication is Pregnancy Category X and is considered extremely dangerous during pregnancy. It is unknown if it is excreted in breast milk. Dupixent Counseling: I discussed with the patient the risks of dupilumab including but not limited to eye infection and irritation, cold sores, injection site reactions, worsening of asthma, allergic reactions and increased risk of parasitic infection.  Live vaccines should be avoided while taking dupilumab. Dupilumab will also interact with certain medications such as warfarin and cyclosporine. The patient understands that monitoring is required and they must alert us or the primary physician if symptoms of infection or other concerning signs are noted. Clindamycin Pregnancy And Lactation Text: This medication can be used in pregnancy if certain situations. Clindamycin is also present in breast milk. Qbrexza Pregnancy And Lactation Text: There is no available data on Qbrexza use in pregnant women.  There is no available data on Qbrexza use in lactation. Calcipotriene Pregnancy And Lactation Text: The use of this medication during pregnancy or lactation is not recommended as there is insufficient data. Terbinafine Pregnancy And Lactation Text: This medication is Pregnancy Category B and is considered safe during pregnancy. It is also excreted in breast milk and breast feeding isn't recommended. VTAMA Counseling: I discussed with the patient that VTAMA is not for use in the eyes, mouth or mouth. They should call the office if they develop any signs of allergic reactions to VTAMA. The patient verbalized understanding of the proper use and possible adverse effects of VTAMA.  All of the patient's questions and concerns were addressed. Xolair Pregnancy And Lactation Text: This medication is Pregnancy Category B and is considered safe during pregnancy. This medication is excreted in breast milk. Aklief counseling:  Patient advised to apply a pea-sized amount only at bedtime and wait 30 minutes after washing their face before applying.  If too drying, patient may add a non-comedogenic moisturizer.  The most commonly reported side effects including irritation, redness, scaling, dryness, stinging, burning, itching, and increased risk of sunburn.  The patient verbalized understanding of the proper use and possible adverse effects of retinoids.  All of the patient's questions and concerns were addressed. Infliximab Pregnancy And Lactation Text: This medication is Pregnancy Category B and is considered safe during pregnancy. It is unknown if this medication is excreted in breast milk. Topical Steroids Counseling: I discussed with the patient that prolonged use of topical steroids can result in the increased appearance of superficial blood vessels (telangiectasias), lightening (hypopigmentation) and thinning of the skin (atrophy).  Patient understands to avoid using high potency steroids in skin folds, the groin or the face.  The patient verbalized understanding of the proper use and possible adverse effects of topical steroids.  All of the patient's questions and concerns were addressed. Xeljanz Counseling: I discussed with the patient the risks of Xeljanz therapy including increased risk of infection, liver issues, headache, diarrhea, or cold symptoms. Live vaccines should be avoided. They were instructed to call if they have any problems. Use Enhanced Medication Counseling?: No Spironolactone Pregnancy And Lactation Text: This medication can cause feminization of the male fetus and should be avoided during pregnancy. The active metabolite is also found in breast milk. Litfulo Counseling: I discussed with the patient the risks of Litfulo therapy including but not limited to upper respiratory tract infections, shingles, cold sores, and nausea. Live vaccines should be avoided.  This medication has been linked to serious infections; higher rate of mortality; malignancy and lymphoproliferative disorders; major adverse cardiovascular events; thrombosis; gastrointestinal perforations; neutropenia; lymphopenia; anemia; liver enzyme elevations; and lipid elevations. Mirvaso Pregnancy And Lactation Text: This medication has not been assigned a Pregnancy Risk Category. It is unknown if the medication is excreted in breast milk. Olanzapine Pregnancy And Lactation Text: This medication is pregnancy category C.   There are no adequate and well controlled trials with olanzapine in pregnant females.  Olanzapine should be used during pregnancy only if the potential benefit justifies the potential risk to the fetus.   In a study in lactating healthy women, olanzapine was excreted in breast milk.  It is recommended that women taking olanzapine should not breast feed. Cyclosporine Counseling:  I discussed with the patient the risks of cyclosporine including but not limited to hypertension, gingival hyperplasia,myelosuppression, immunosuppression, liver damage, kidney damage, neurotoxicity, lymphoma, and serious infections. The patient understands that monitoring is required including baseline blood pressure, CBC, CMP, lipid panel and uric acid, and then 1-2 times monthly CMP and blood pressure. Dutasteride Pregnancy And Lactation Text: This medication is absolutely contraindicated in women, especially during pregnancy and breast feeding. Feminization of male fetuses is possible if taking while pregnant. Gabapentin Counseling: I discussed with the patient the risks of gabapentin including but not limited to dizziness, somnolence, fatigue and ataxia. Tazorac Counseling:  Patient advised that medication is irritating and drying.  Patient may need to apply sparingly and wash off after an hour before eventually leaving it on overnight.  The patient verbalized understanding of the proper use and possible adverse effects of tazorac.  All of the patient's questions and concerns were addressed. Albendazole Pregnancy And Lactation Text: This medication is Pregnancy Category C and it isn't known if it is safe during pregnancy. It is also excreted in breast milk. Low Dose Naltrexone Pregnancy And Lactation Text: Naltrexone is pregnancy category C.  There have been no adequate and well-controlled studies in pregnant women.  It should be used in pregnancy only if the potential benefit justifies the potential risk to the fetus.   Limited data indicates that naltrexone is minimally excreted into breastmilk. High Dose Vitamin A Counseling: Side effects reviewed, pt to contact office should one occur. Rhofade Counseling: Rhofade is a topical medication which can decrease superficial blood flow where applied. Side effects are uncommon and include stinging, redness and allergic reactions. SSKI Counseling:  I discussed with the patient the risks of SSKI including but not limited to thyroid abnormalities, metallic taste, GI upset, fever, headache, acne, arthralgias, paraesthesias, lymphadenopathy, easy bleeding, arrhythmias, and allergic reaction. Arava Counseling:  Patient counseled regarding adverse effects of Arava including but not limited to nausea, vomiting, abnormalities in liver function tests. Patients may develop mouth sores, rash, diarrhea, and abnormalities in blood counts. The patient understands that monitoring is required including LFTs and blood counts.  There is a rare possibility of scarring of the liver and lung problems that can occur when taking methotrexate. Persistent nausea, loss of appetite, pale stools, dark urine, cough, and shortness of breath should be reported immediately. Patient advised to discontinue Arava treatment and consult with a physician prior to attempting conception. The patient will have to undergo a treatment to eliminate Arava from the body prior to conception. Cyclosporine Pregnancy And Lactation Text: This medication is Pregnancy Category C and it isn't know if it is safe during pregnancy. This medication is excreted in breast milk. Doxycycline Counseling:  Patient counseled regarding possible photosensitivity and increased risk for sunburn.  Patient instructed to avoid sunlight, if possible.  When exposed to sunlight, patients should wear protective clothing, sunglasses, and sunscreen.  The patient was instructed to call the office immediately if the following severe adverse effects occur:  hearing changes, easy bruising/bleeding, severe headache, or vision changes.  The patient verbalized understanding of the proper use and possible adverse effects of doxycycline.  All of the patient's questions and concerns were addressed. Fluconazole Counseling:  Patient counseled regarding adverse effects of fluconazole including but not limited to headache, diarrhea, nausea, upset stomach, liver function test abnormalities, taste disturbance, and stomach pain.  There is a rare possibility of liver failure that can occur when taking fluconazole.  The patient understands that monitoring of LFTs and kidney function test may be required, especially at baseline. The patient verbalized understanding of the proper use and possible adverse effects of fluconazole.  All of the patient's questions and concerns were addressed. Dupixent Pregnancy And Lactation Text: This medication likely crosses the placenta but the risk for the fetus is uncertain. This medication is excreted in breast milk. Cantharidin Pregnancy And Lactation Text: This medication has not been proven safe during pregnancy. It is unknown if this medication is excreted in breast milk. Cantharidin Counseling:  I discussed with the patient the risks of Cantharidin including but not limited to pain, redness, burning, itching, and blistering. Aklief Pregnancy And Lactation Text: It is unknown if this medication is safe to use during pregnancy.  It is unknown if this medication is excreted in breast milk.  Breastfeeding women should use the topical cream on the smallest area of the skin for the shortest time needed while breastfeeding.  Do not apply to nipple and areola. Vtama Pregnancy And Lactation Text: It is unknown if this medication can cause problems during pregnancy and breastfeeding. Griseofulvin Counseling:  I discussed with the patient the risks of griseofulvin including but not limited to photosensitivity, cytopenia, liver damage, nausea/vomiting and severe allergy.  The patient understands that this medication is best absorbed when taken with a fatty meal (e.g., ice cream or french fries). Quinolones Counseling:  I discussed with the patient the risks of fluoroquinolones including but not limited to GI upset, allergic reaction, drug rash, diarrhea, dizziness, photosensitivity, yeast infections, liver function test abnormalities, tendonitis/tendon rupture. Rituxan Counseling:  I discussed with the patient the risks of Rituxan infusions. Side effects can include infusion reactions, severe drug rashes including mucocutaneous reactions, reactivation of latent hepatitis and other infections and rarely progressive multifocal leukoencephalopathy.  All of the patient's questions and concerns were addressed. Cimetidine Counseling:  I discussed with the patient the risks of Cimetidine including but not limited to gynecomastia, headache, diarrhea, nausea, drowsiness, arrhythmias, pancreatitis, skin rashes, psychosis, bone marrow suppression and kidney toxicity. Hydroquinone Counseling:  Patient advised that medication may result in skin irritation, lightening (hypopigmentation), dryness, and burning.  In the event of skin irritation, the patient was advised to reduce the amount of the drug applied or use it less frequently.  Rarely, spots that are treated with hydroquinone can become darker (pseudoochronosis).  Should this occur, patient instructed to stop medication and call the office. The patient verbalized understanding of the proper use and possible adverse effects of hydroquinone.  All of the patient's questions and concerns were addressed. Litfulo Pregnancy And Lactation Text: Based on animal studies, Lifulo may cause embryo-fetal harm when administered to pregnant women.  The medication should not be used in pregnancy.  Breastfeeding is not recommended during treatment. Xelmilindz Pregnancy And Lactation Text: This medication is Pregnancy Category D and is not considered safe during pregnancy.  The risk during breast feeding is also uncertain. Opzelura Counseling:  I discussed with the patient the risks of Opzelura including but not limited to nasopharngitis, bronchitis, ear infection, eosinophila, hives, diarrhea, folliculitis, tonsillitis, and rhinorrhea.  Taken orally, this medication has been linked to serious infections; higher rate of mortality; malignancy and lymphoproliferative disorders; major adverse cardiovascular events; thrombosis; thrombocytopenia, anemia, and neutropenia; and lipid elevations. Stelara Counseling:  I discussed with the patient the risks of ustekinumab including but not limited to immunosuppression, malignancy, posterior leukoencephalopathy syndrome, and serious infections.  The patient understands that monitoring is required including a PPD at baseline and must alert us or the primary physician if symptoms of infection or other concerning signs are noted. Finasteride Male Counseling: Finasteride Counseling:  I discussed with the patient the risks of use of finasteride including but not limited to decreased libido, decreased ejaculate volume, gynecomastia, and depression. Women should not handle medication.  All of the patient's questions and concerns were addressed. Topical Steroids Applications Pregnancy And Lactation Text: Most topical steroids are considered safe to use during pregnancy and lactation.  Any topical steroid applied to the breast or nipple should be washed off before breastfeeding. Enbrel Counseling:  I discussed with the patient the risks of etanercept including but not limited to myelosuppression, immunosuppression, autoimmune hepatitis, demyelinating diseases, lymphoma, and infections.  The patient understands that monitoring is required including a PPD at baseline and must alert us or the primary physician if symptoms of infection or other concerning signs are noted. Tazorac Pregnancy And Lactation Text: This medication is not safe during pregnancy. It is unknown if this medication is excreted in breast milk. Oral Minoxidil Counseling- I discussed with the patient the risks of oral minoxidil including but not limited to shortness of breath, swelling of the feet or ankles, dizziness, lightheadedness, unwanted hair growth and allergic reaction.  The patient verbalized understanding of the proper use and possible adverse effects of oral minoxidil.  All of the patient's questions and concerns were addressed. Azithromycin Counseling:  I discussed with the patient the risks of azithromycin including but not limited to GI upset, allergic reaction, drug rash, diarrhea, and yeast infections. Ivermectin Counseling:  Patient instructed to take medication on an empty stomach with a full glass of water.  Patient informed of potential adverse effects including but not limited to nausea, diarrhea, dizziness, itching, and swelling of the extremities or lymph nodes.  The patient verbalized understanding of the proper use and possible adverse effects of ivermectin.  All of the patient's questions and concerns were addressed. Detail Level: Simple Niacinamide Counseling: I recommended taking niacin or niacinamide, also know as vitamin B3, twice daily. Recent evidence suggests that taking vitamin B3 (500 mg twice daily) can reduce the risk of actinic keratoses and non-melanoma skin cancers. Side effects of vitamin B3 include flushing and headache. Erivedge Counseling- I discussed with the patient the risks of Erivedge including but not limited to nausea, vomiting, diarrhea, constipation, weight loss, changes in the sense of taste, decreased appetite, muscle spasms, and hair loss.  The patient verbalized understanding of the proper use and possible adverse effects of Erivedge.  All of the patient's questions and concerns were addressed. High Dose Vitamin A Pregnancy And Lactation Text: High dose vitamin A therapy is contraindicated during pregnancy and breast feeding. Methotrexate Counseling:  Patient counseled regarding adverse effects of methotrexate including but not limited to nausea, vomiting, abnormalities in liver function tests. Patients may develop mouth sores, rash, diarrhea, and abnormalities in blood counts. The patient understands that monitoring is required including LFT's and blood counts.  There is a rare possibility of scarring of the liver and lung problems that can occur when taking methotrexate. Persistent nausea, loss of appetite, pale stools, dark urine, cough, and shortness of breath should be reported immediately. Patient advised to discontinue methotrexate treatment at least three months before attempting to become pregnant.  I discussed the need for folate supplements while taking methotrexate.  These supplements can decrease side effects during methotrexate treatment. The patient verbalized understanding of the proper use and possible adverse effects of methotrexate.  All of the patient's questions and concerns were addressed. Azathioprine Counseling:  I discussed with the patient the risks of azathioprine including but not limited to myelosuppression, immunosuppression, hepatotoxicity, lymphoma, and infections.  The patient understands that monitoring is required including baseline LFTs, Creatinine, possible TPMP genotyping and weekly CBCs for the first month and then every 2 weeks thereafter.  The patient verbalized understanding of the proper use and possible adverse effects of azathioprine.  All of the patient's questions and concerns were addressed. Doxycycline Pregnancy And Lactation Text: This medication is Pregnancy Category D and not consider safe during pregnancy. It is also excreted in breast milk but is considered safe for shorter treatment courses. Fluconazole Pregnancy And Lactation Text: This medication is Pregnancy Category C and it isn't know if it is safe during pregnancy. It is also excreted in breast milk. Azelaic Acid Counseling: Patient counseled that medicine may cause skin irritation and to avoid applying near the eyes.  In the event of skin irritation, the patient was advised to reduce the amount of the drug applied or use it less frequently.   The patient verbalized understanding of the proper use and possible adverse effects of azelaic acid.  All of the patient's questions and concerns were addressed. 5-Fu Counseling: 5-Fluorouracil Counseling:  I discussed with the patient the risks of 5-fluorouracil including but not limited to erythema, scaling, itching, weeping, crusting, and pain. Zoryve Counseling:  I discussed with the patient that Zoryve is not for use in the eyes, mouth or vagina. The most commonly reported side effects include diarrhea, headache, insomnia, application site pain, upper respiratory tract infections, and urinary tract infections.  All of the patient's questions and concerns were addressed. Opzelura Pregnancy And Lactation Text: There is insufficient data to evaluate drug-associated risk for major birth defects, miscarriage, or other adverse maternal or fetal outcomes.  There is a pregnancy registry that monitors pregnancy outcomes in pregnant persons exposed to the medication during pregnancy.  It is unknown if this medication is excreted in breast milk.  Do not breastfeed during treatment and for about 4 weeks after the last dose. Sski Pregnancy And Lactation Text: This medication is Pregnancy Category D and isn't considered safe during pregnancy. It is excreted in breast milk. Griseofulvin Pregnancy And Lactation Text: This medication is Pregnancy Category X and is known to cause serious birth defects. It is unknown if this medication is excreted in breast milk but breast feeding should be avoided. Finasteride Pregnancy And Lactation Text: This medication is absolutely contraindicated during pregnancy. It is unknown if it is excreted in breast milk. Topical Sulfur Applications Counseling: Topical Sulfur Counseling: Patient counseled that this medication may cause skin irritation or allergic reactions.  In the event of skin irritation, the patient was advised to reduce the amount of the drug applied or use it less frequently.   The patient verbalized understanding of the proper use and possible adverse effects of topical sulfur application.  All of the patient's questions and concerns were addressed. Olumiant Counseling: I discussed with the patient the risks of Olumiant therapy including but not limited to upper respiratory tract infections, shingles, cold sores, and nausea. Live vaccines should be avoided.  This medication has been linked to serious infections; higher rate of mortality; malignancy and lymphoproliferative disorders; major adverse cardiovascular events; thrombosis; gastrointestinal perforations; neutropenia; lymphopenia; anemia; liver enzyme elevations; and lipid elevations. Oral Minoxidil Pregnancy And Lactation Text: This medication should only be used when clearly needed if you are pregnant, attempting to become pregnant or breast feeding. Azithromycin Pregnancy And Lactation Text: This medication is considered safe during pregnancy and is also secreted in breast milk. Glycopyrrolate Counseling:  I discussed with the patient the risks of glycopyrrolate including but not limited to skin rash, drowsiness, dry mouth, difficulty urinating, and blurred vision. Niacinamide Pregnancy And Lactation Text: These medications are considered safe during pregnancy. Rituxan Pregnancy And Lactation Text: This medication is Pregnancy Category C and it isn't know if it is safe during pregnancy. It is unknown if this medication is excreted in breast milk but similar antibodies are known to be excreted. Topical Clindamycin Counseling: Patient counseled that this medication may cause skin irritation or allergic reactions.  In the event of skin irritation, the patient was advised to reduce the amount of the drug applied or use it less frequently.   The patient verbalized understanding of the proper use and possible adverse effects of clindamycin.  All of the patient's questions and concerns were addressed. Clofazimine Counseling:  I discussed with the patient the risks of clofazimine including but not limited to skin and eye pigmentation, liver damage, nausea/vomiting, gastrointestinal bleeding and allergy. Solaraze Counseling:  I discussed with the patient the risks of Solaraze including but not limited to erythema, scaling, itching, weeping, crusting, and pain. Otezla Counseling: The side effects of Otezla were discussed with the patient, including but not limited to worsening or new depression, weight loss, diarrhea, nausea, upper respiratory tract infection, and headache. Patient instructed to call the office should any adverse effect occur.  The patient verbalized understanding of the proper use and possible adverse effects of Otezla.  All the patient's questions and concerns were addressed. Azathioprine Pregnancy And Lactation Text: This medication is Pregnancy Category D and isn't considered safe during pregnancy. It is unknown if this medication is excreted in breast milk. Methotrexate Pregnancy And Lactation Text: This medication is Pregnancy Category X and is known to cause fetal harm. This medication is excreted in breast milk. Erythromycin Counseling:  I discussed with the patient the risks of erythromycin including but not limited to GI upset, allergic reaction, drug rash, diarrhea, increase in liver enzymes, and yeast infections. Rifampin Counseling: I discussed with the patient the risks of rifampin including but not limited to liver damage, kidney damage, red-orange body fluids, nausea/vomiting and severe allergy. Azelaic Acid Pregnancy And Lactation Text: This medication is considered safe during pregnancy and breast feeding. Picato Counseling:  I discussed with the patient the risks of Picato including but not limited to erythema, scaling, itching, weeping, crusting, and pain. Taltz Counseling: I discussed with the patient the risks of ixekizumab including but not limited to immunosuppression, serious infections, worsening of inflammatory bowel disease and drug reactions.  The patient understands that monitoring is required including a PPD at baseline and must alert us or the primary physician if symptoms of infection or other concerning signs are noted. Itraconazole Counseling:  I discussed with the patient the risks of itraconazole including but not limited to liver damage, nausea/vomiting, neuropathy, and severe allergy.  The patient understands that this medication is best absorbed when taken with acidic beverages such as non-diet cola or ginger ale.  The patient understands that monitoring is required including baseline LFTs and repeat LFTs at intervals.  The patient understands that they are to contact us or the primary physician if concerning signs are noted. Thalidomide Counseling: I discussed with the patient the risks of thalidomide including but not limited to birth defects, anxiety, weakness, chest pain, dizziness, cough and severe allergy. Birth Control Pills Counseling: Birth Control Pill Counseling: I discussed with the patient the potential side effects of OCPs including but not limited to increased risk of stroke, heart attack, thrombophlebitis, deep venous thrombosis, hepatic adenomas, breast changes, GI upset, headaches, and depression.  The patient verbalized understanding of the proper use and possible adverse effects of OCPs. All of the patient's questions and concerns were addressed. Siliq Counseling:  I discussed with the patient the risks of Siliq including but not limited to new or worsening depression, suicidal thoughts and behavior, immunosuppression, malignancy, posterior leukoencephalopathy syndrome, and serious infections.  The patient understands that monitoring is required including a PPD at baseline and must alert us or the primary physician if symptoms of infection or other concerning signs are noted. There is also a special program designed to monitor depression which is required with Siliq. Topical Sulfur Applications Pregnancy And Lactation Text: This medication is considered safe during pregnancy and breast feeding secondary to limited systemic absorption. Libtayo Counseling- I discussed with the patient the risks of Libtayo including but not limited to nausea, vomiting, diarrhea, and bone or muscle pain.  The patient verbalized understanding of the proper use and possible adverse effects of Libtayo.  All of the patient's questions and concerns were addressed. Drysol Counseling:  I discussed with the patient the risks of drysol/aluminum chloride including but not limited to skin rash, itching, irritation, burning. Doxepin Counseling:  Patient advised that the medication is sedating and not to drive a car after taking this medication. Patient informed of potential adverse effects including but not limited to dry mouth, urinary retention, and blurry vision.  The patient verbalized understanding of the proper use and possible adverse effects of doxepin.  All of the patient's questions and concerns were addressed. Humira Counseling:  I discussed with the patient the risks of adalimumab including but not limited to myelosuppression, immunosuppression, autoimmune hepatitis, demyelinating diseases, lymphoma, and serious infections.  The patient understands that monitoring is required including a PPD at baseline and must alert us or the primary physician if symptoms of infection or other concerning signs are noted. Acitretin Counseling:  I discussed with the patient the risks of acitretin including but not limited to hair loss, dry lips/skin/eyes, liver damage, hyperlipidemia, depression/suicidal ideation, photosensitivity.  Serious rare side effects can include but are not limited to pancreatitis, pseudotumor cerebri, bony changes, clot formation/stroke/heart attack.  Patient understands that alcohol is contraindicated since it can result in liver toxicity and significantly prolong the elimination of the drug by many years. Nsaids Counseling: NSAID Counseling: I discussed with the patient that NSAIDs should be taken with food. Prolonged use of NSAIDs can result in the development of stomach ulcers.  Patient advised to stop taking NSAIDs if abdominal pain occurs.  The patient verbalized understanding of the proper use and possible adverse effects of NSAIDs.  All of the patient's questions and concerns were addressed. Bactrim Counseling:  I discussed with the patient the risks of sulfa antibiotics including but not limited to GI upset, allergic reaction, drug rash, diarrhea, dizziness, photosensitivity, and yeast infections.  Rarely, more serious reactions can occur including but not limited to aplastic anemia, agranulocytosis, methemoglobinemia, blood dyscrasias, liver or kidney failure, lung infiltrates or desquamative/blistering drug rashes. Glycopyrrolate Pregnancy And Lactation Text: This medication is Pregnancy Category B and is considered safe during pregnancy. It is unknown if it is excreted breast milk. Imiquimod Counseling:  I discussed with the patient the risks of imiquimod including but not limited to erythema, scaling, itching, weeping, crusting, and pain.  Patient understands that the inflammatory response to imiquimod is variable from person to person and was educated regarded proper titration schedule.  If flu-like symptoms develop, patient knows to discontinue the medication and contact us. Erythromycin Pregnancy And Lactation Text: This medication is Pregnancy Category B and is considered safe during pregnancy. It is also excreted in breast milk. Zyclara Counseling:  I discussed with the patient the risks of imiquimod including but not limited to erythema, scaling, itching, weeping, crusting, and pain.  Patient understands that the inflammatory response to imiquimod is variable from person to person and was educated regarded proper titration schedule.  If flu-like symptoms develop, patient knows to discontinue the medication and contact us. Otezla Pregnancy And Lactation Text: This medication is Pregnancy Category C and it isn't known if it is safe during pregnancy. It is unknown if it is excreted in breast milk. Prednisone Counseling:  I discussed with the patient the risks of prolonged use of prednisone including but not limited to weight gain, insomnia, osteoporosis, mood changes, diabetes, susceptibility to infection, glaucoma and high blood pressure.  In cases where prednisone use is prolonged, patients should be monitored with blood pressure checks, serum glucose levels and an eye exam.  Additionally, the patient may need to be placed on GI prophylaxis, PCP prophylaxis, and calcium and vitamin D supplementation and/or a bisphosphonate.  The patient verbalized understanding of the proper use and the possible adverse effects of prednisone.  All of the patient's questions and concerns were addressed. Acitretin Pregnancy And Lactation Text: This medication is Pregnancy Category X and should not be given to women who are pregnant or may become pregnant in the future. This medication is excreted in breast milk. Cellcept Counseling:  I discussed with the patient the risks of mycophenolate mofetil including but not limited to infection/immunosuppression, GI upset, hypokalemia, hypercholesterolemia, bone marrow suppression, lymphoproliferative disorders, malignancy, GI ulceration/bleed/perforation, colitis, interstitial lung disease, kidney failure, progressive multifocal leukoencephalopathy, and birth defects.  The patient understands that monitoring is required including a baseline creatinine and regular CBC testing. In addition, patient must alert us immediately if symptoms of infection or other concerning signs are noted. Solaraze Pregnancy And Lactation Text: This medication is Pregnancy Category B and is considered safe. There is some data to suggest avoiding during the third trimester. It is unknown if this medication is excreted in breast milk. Olumiant Pregnancy And Lactation Text: Based on animal studies, Olumiant may cause embryo-fetal harm when administered to pregnant women.  The medication should not be used in pregnancy.  Breastfeeding is not recommended during treatment. Rifampin Pregnancy And Lactation Text: This medication is Pregnancy Category C and it isn't know if it is safe during pregnancy. It is also excreted in breast milk and should not be used if you are breast feeding. Adbry Counseling: I discussed with the patient the risks of tralokinumab including but not limited to eye infection and irritation, cold sores, injection site reactions, worsening of asthma, allergic reactions and increased risk of parasitic infection.  Live vaccines should be avoided while taking tralokinumab. The patient understands that monitoring is required and they must alert us or the primary physician if symptoms of infection or other concerning signs are noted. Benzoyl Peroxide Counseling: Patient counseled that medicine may cause skin irritation and bleach clothing.  In the event of skin irritation, the patient was advised to reduce the amount of the drug applied or use it less frequently.   The patient verbalized understanding of the proper use and possible adverse effects of benzoyl peroxide.  All of the patient's questions and concerns were addressed. Libtayo Pregnancy And Lactation Text: This medication is contraindicated in pregnancy and when breast feeding. Wartpeel Counseling:  I discussed with the patient the risks of Wartpeel including but not limited to erythema, scaling, itching, weeping, crusting, and pain. Doxepin Pregnancy And Lactation Text: This medication is Pregnancy Category C and it isn't known if it is safe during pregnancy. It is also excreted in breast milk and breast feeding isn't recommended. Benzoyl Peroxide Pregnancy And Lactation Text: This medication is Pregnancy Category C. It is unknown if benzoyl peroxide is excreted in breast milk. Bactrim Pregnancy And Lactation Text: This medication is Pregnancy Category D and is known to cause fetal risk.  It is also excreted in breast milk. Topical Ketoconazole Counseling: Patient counseled that this medication may cause skin irritation or allergic reactions.  In the event of skin irritation, the patient was advised to reduce the amount of the drug applied or use it less frequently.   The patient verbalized understanding of the proper use and possible adverse effects of ketoconazole.  All of the patient's questions and concerns were addressed. Tremfya Counseling: I discussed with the patient the risks of guselkumab including but not limited to immunosuppression, serious infections, and drug reactions.  The patient understands that monitoring is required including a PPD at baseline and must alert us or the primary physician if symptoms of infection or other concerning signs are noted. Metronidazole Counseling:  I discussed with the patient the risks of metronidazole including but not limited to seizures, nausea/vomiting, a metallic taste in the mouth, nausea/vomiting and severe allergy. Protopic Counseling: Patient may experience a mild burning sensation during topical application. Protopic is not approved in children less than 2 years of age. There have been case reports of hematologic and skin malignancies in patients using topical calcineurin inhibitors although causality is questionable. Adbry Pregnancy And Lactation Text: It is unknown if this medication will adversely affect pregnancy or breast feeding. Ketoconazole Counseling:   Patient counseled regarding improving absorption with orange juice.  Adverse effects include but are not limited to breast enlargement, headache, diarrhea, nausea, upset stomach, liver function test abnormalities, taste disturbance, and stomach pain.  There is a rare possibility of liver failure that can occur when taking ketoconazole. The patient understands that monitoring of LFTs may be required, especially at baseline. The patient verbalized understanding of the proper use and possible adverse effects of ketoconazole.  All of the patient's questions and concerns were addressed. Oxybutynin Counseling:  I discussed with the patient the risks of oxybutynin including but not limited to skin rash, drowsiness, dry mouth, difficulty urinating, and blurred vision. Colchicine Counseling:  Patient counseled regarding adverse effects including but not limited to stomach upset (nausea, vomiting, stomach pain, or diarrhea).  Patient instructed to limit alcohol consumption while taking this medication.  Colchicine may reduce blood counts especially with prolonged use.  The patient understands that monitoring of kidney function and blood counts may be required, especially at baseline. The patient verbalized understanding of the proper use and possible adverse effects of colchicine.  All of the patient's questions and concerns were addressed. Bexarotene Counseling:  I discussed with the patient the risks of bexarotene including but not limited to hair loss, dry lips/skin/eyes, liver abnormalities, hyperlipidemia, pancreatitis, depression/suicidal ideation, photosensitivity, drug rash/allergic reactions, hypothyroidism, anemia, leukopenia, infection, cataracts, and teratogenicity.  Patient understands that they will need regular blood tests to check lipid profile, liver function tests, white blood cell count, thyroid function tests and pregnancy test if applicable. Soolantra Counseling: I discussed with the patients the risks of topial Soolantra. This is a medicine which decreases the number of mites and inflammation in the skin. You experience burning, stinging, eye irritation or allergic reactions.  Please call our office if you develop any problems from using this medication. Rinvoq Counseling: I discussed with the patient the risks of Rinvoq therapy including but not limited to upper respiratory tract infections, shingles, cold sores, bronchitis, nausea, cough, fever, acne, and headache. Live vaccines should be avoided.  This medication has been linked to serious infections; higher rate of mortality; malignancy and lymphoproliferative disorders; major adverse cardiovascular events; thrombosis; thrombocytopenia, anemia, and neutropenia; lipid elevations; liver enzyme elevations; and gastrointestinal perforations. Tranexamic Acid Counseling:  Patient advised of the small risk of bleeding problems with tranexamic acid. They were also instructed to call if they developed any nausea, vomiting or diarrhea. All of the patient's questions and concerns were addressed. Sarecycline Counseling: Patient advised regarding possible photosensitivity and discoloration of the teeth, skin, lips, tongue and gums.  Patient instructed to avoid sunlight, if possible.  When exposed to sunlight, patients should wear protective clothing, sunglasses, and sunscreen.  The patient was instructed to call the office immediately if the following severe adverse effects occur:  hearing changes, easy bruising/bleeding, severe headache, or vision changes.  The patient verbalized understanding of the proper use and possible adverse effects of sarecycline.  All of the patient's questions and concerns were addressed. Hydroxyzine Counseling: Patient advised that the medication is sedating and not to drive a car after taking this medication.  Patient informed of potential adverse effects including but not limited to dry mouth, urinary retention, and blurry vision.  The patient verbalized understanding of the proper use and possible adverse effects of hydroxyzine.  All of the patient's questions and concerns were addressed.

## 2024-07-15 NOTE — H&P ADULT - NSHPPHYSICALEXAM_GEN_ALL_CORE
Vital Signs Last 24 Hrs  T(C): 36.7 (15 Jul 2024 23:31), Max: 37 (15 Jul 2024 13:05)  T(F): 98 (15 Jul 2024 23:31), Max: 98.6 (15 Jul 2024 13:05)  HR: 60 (15 Jul 2024 23:31) (60 - 65)  BP: 123/56 (15 Jul 2024 23:31) (114/63 - 147/67)  BP(mean): --  RR: 20 (15 Jul 2024 23:31) (18 - 20)  SpO2: 96% (15 Jul 2024 23:31) (94% - 97%)    Parameters below as of 15 Jul 2024 23:31  Patient On (Oxygen Delivery Method): room air    GENERAL:  Well-appearing, not in acute distress  EYES:  Clear conjunctiva, extraocular movement intact  ENT: Moist mucous membranes  RESP:  Non-labored breathing pattern, lungs clear to ausculation in anterior fields  CV: Regular rate and rhythm, no murmurs appreciated, no lower extremity edema  GI: Soft, non-tender, mild tenderness to palpation in all 4 quadrants  NEURO: Awake, alert, conversant, upper and lower extremity strength 5/5, light touch sensation grossly intact  PSYCH: Calm, cooperative  SKIN: RLE wound covered in black dressing w/ wound vac at bedside

## 2024-07-15 NOTE — ED ADULT NURSE NOTE - NS ED NURSE DISCH DISPOSITION
===============================  07/03/2017   Kelton Mcdaniels,   89 y.o. male   Last visit [facility]: :5/3/2017   Last visit eye dept. 5/3/2017  [facility]:  Corrected distance visual acuity was 20/400 in the right eye and 20/30 in the left eye.  Tonometry     Tonometry (Applanation, 3:02 PM)       Right Left    Pressure 12 12               Not recorded         Not recorded        Chief Complaint   Patient presents with    Macular Degeneration     7 wk EYLEA eval        HPI     Macular Degeneration    Additional comments: 7 wk EYLEA eval           Comments   Pt here for 7 wk return. Pt states no change since last appt. No pain or   discomfort. [facility] stable.     --Smd/srn od dx. 10/5/15   --2+ vac cat ou      EYLEA OD 1/20/17  H/o Avastin       Last edited by Vladislav Fernandes, Patient Care Assistant on 7/3/2017  2:18   PM. (History)      Read Studies: y  Vitalsy  ________________  7/3/2017  Problem List Items Addressed This Visit        Eye/Vision problems    Age-related macular degeneration, wet, right eye - Primary    Overview     Diagnosed 10/5/15 symptomatic 4 months at that time.  Treated with Avastin then Eylea         Relevant Orders    Posterior Segment OCT Retina-Both eyes (Completed)      Other Visit Diagnoses    None.       .  .od sl worse srn me asymptomatic  20/200   os ok    rtc 4 moths call if any worse   Call 24/7 for any worsening of vision. Check [facility] QD. Gave my home phone number.   stable   2+ ns ou        ===========================     Admitted

## 2024-07-15 NOTE — CONSULT NOTE ADULT - ASSESSMENT
72 yo F presenting with lower abdominal pain and diarrhea with decreased appetite.  Surgery consulted for r/o acute cholecystitis.   No evidence on history physical exam, or imaging of acute cholecystitis. In addition, CT with concern for colitis and patient with diarrhea and lower abdominal pain.     Plan:  No surgical intervention required  Dispo per ED

## 2024-07-15 NOTE — ED PROVIDER NOTE - NSICDXPASTSURGICALHX_GEN_ALL_CORE_FT
PAST SURGICAL HISTORY:  Cardiac pacemaker     Cataract     History of benign brain tumor     History of biopsy     S/P angiogram of extremity

## 2024-07-15 NOTE — ED PROVIDER NOTE - OBJECTIVE STATEMENT
73F with PMHx w DM, htn, PAD, POD5 wound debridement and CTA runoffs on RLE shin presenting with abdominal pain and lack of appetite since being discharged from the hospital 2 days ago for her surgery c/b UTI. She has had brown and black diarrhea constantly through the day and night (pt on iron supplements) and RLQ abdominal pain. States she can keep down water. Denies n/v. 73F with PMHx w DM, htn, PAD, POD5 wound debridement and CTA runoffs on RLE shin presenting with abdominal pain and lack of appetite since being discharged from the hospital 2 days ago for her surgery c/b UTI s/p Bactrim. She has had brown and black diarrhea constantly through the day and night (pt on iron supplements) and RLQ abdominal pain. States she can keep down water. Denies n/v.

## 2024-07-15 NOTE — H&P ADULT - HISTORY OF PRESENT ILLNESS
73yoF hx CAD s/p PCI, PAD with chronic RLE wound s/p debridement on 7/9 with wound vac present, HFpEF, HTN, s/p PPM presenting with few days of generalized, sharp and crampy abdominal pain associated with watery, non-bloody diarrhea.  Pt also report nausea without vomiting and reduced appetite.  She has been able to tolerate liquids and her pills but not eat much solid foods.  Admission CT A/P with fluid filled colon and diffuse colonic wall thickening and GI PCI positive for EAEC E. coli.

## 2024-07-16 LAB
ALBUMIN SERPL ELPH-MCNC: 3.3 G/DL — SIGNIFICANT CHANGE UP (ref 3.3–5.2)
ALBUMIN SERPL ELPH-MCNC: 3.3 G/DL — SIGNIFICANT CHANGE UP (ref 3.3–5.2)
ALP SERPL-CCNC: 314 U/L — HIGH (ref 40–120)
ALP SERPL-CCNC: 314 U/L — HIGH (ref 40–120)
ALT FLD-CCNC: 44 U/L — HIGH
ALT FLD-CCNC: 44 U/L — HIGH
ANION GAP SERPL CALC-SCNC: 19 MMOL/L — HIGH (ref 5–17)
AST SERPL-CCNC: 20 U/L — SIGNIFICANT CHANGE UP
AST SERPL-CCNC: 20 U/L — SIGNIFICANT CHANGE UP
BASOPHILS # BLD AUTO: 0.04 K/UL — SIGNIFICANT CHANGE UP (ref 0–0.2)
BASOPHILS NFR BLD AUTO: 0.4 % — SIGNIFICANT CHANGE UP (ref 0–2)
BILIRUB DIRECT SERPL-MCNC: 0.1 MG/DL — SIGNIFICANT CHANGE UP (ref 0–0.3)
BILIRUB INDIRECT FLD-MCNC: 0.4 MG/DL — SIGNIFICANT CHANGE UP (ref 0.2–1)
BILIRUB SERPL-MCNC: 0.6 MG/DL — SIGNIFICANT CHANGE UP (ref 0.4–2)
BILIRUB SERPL-MCNC: 0.6 MG/DL — SIGNIFICANT CHANGE UP (ref 0.4–2)
BUN SERPL-MCNC: 10.2 MG/DL — SIGNIFICANT CHANGE UP (ref 8–20)
CALCIUM SERPL-MCNC: 6.7 MG/DL — LOW (ref 8.4–10.5)
CHLORIDE SERPL-SCNC: 98 MMOL/L — SIGNIFICANT CHANGE UP (ref 96–108)
CO2 SERPL-SCNC: 16 MMOL/L — LOW (ref 22–29)
CREAT SERPL-MCNC: 1.23 MG/DL — SIGNIFICANT CHANGE UP (ref 0.5–1.3)
EGFR: 47 ML/MIN/1.73M2 — LOW
EOSINOPHIL # BLD AUTO: 0.11 K/UL — SIGNIFICANT CHANGE UP (ref 0–0.5)
EOSINOPHIL NFR BLD AUTO: 1 % — SIGNIFICANT CHANGE UP (ref 0–6)
GLUCOSE BLDC GLUCOMTR-MCNC: 192 MG/DL — HIGH (ref 70–99)
GLUCOSE BLDC GLUCOMTR-MCNC: 203 MG/DL — HIGH (ref 70–99)
GLUCOSE BLDC GLUCOMTR-MCNC: 210 MG/DL — HIGH (ref 70–99)
GLUCOSE BLDC GLUCOMTR-MCNC: 392 MG/DL — HIGH (ref 70–99)
GLUCOSE BLDC GLUCOMTR-MCNC: 490 MG/DL — CRITICAL HIGH (ref 70–99)
GLUCOSE SERPL-MCNC: 181 MG/DL — HIGH (ref 70–99)
HCT VFR BLD CALC: 31.1 % — LOW (ref 34.5–45)
HGB BLD-MCNC: 10.1 G/DL — LOW (ref 11.5–15.5)
IMM GRANULOCYTES NFR BLD AUTO: 0.9 % — SIGNIFICANT CHANGE UP (ref 0–0.9)
LYMPHOCYTES # BLD AUTO: 1.86 K/UL — SIGNIFICANT CHANGE UP (ref 1–3.3)
LYMPHOCYTES # BLD AUTO: 17.4 % — SIGNIFICANT CHANGE UP (ref 13–44)
MAGNESIUM SERPL-MCNC: 2.1 MG/DL — SIGNIFICANT CHANGE UP (ref 1.6–2.6)
MCHC RBC-ENTMCNC: 27.3 PG — SIGNIFICANT CHANGE UP (ref 27–34)
MCHC RBC-ENTMCNC: 32.5 GM/DL — SIGNIFICANT CHANGE UP (ref 32–36)
MCV RBC AUTO: 84.1 FL — SIGNIFICANT CHANGE UP (ref 80–100)
MONOCYTES # BLD AUTO: 1.4 K/UL — HIGH (ref 0–0.9)
MONOCYTES NFR BLD AUTO: 13.1 % — SIGNIFICANT CHANGE UP (ref 2–14)
NEUTROPHILS # BLD AUTO: 7.16 K/UL — SIGNIFICANT CHANGE UP (ref 1.8–7.4)
NEUTROPHILS NFR BLD AUTO: 67.2 % — SIGNIFICANT CHANGE UP (ref 43–77)
PLATELET # BLD AUTO: 523 K/UL — HIGH (ref 150–400)
POTASSIUM SERPL-MCNC: 3.5 MMOL/L — SIGNIFICANT CHANGE UP (ref 3.5–5.3)
POTASSIUM SERPL-SCNC: 3.5 MMOL/L — SIGNIFICANT CHANGE UP (ref 3.5–5.3)
PROT SERPL-MCNC: 6.9 G/DL — SIGNIFICANT CHANGE UP (ref 6.6–8.7)
PROT SERPL-MCNC: 6.9 G/DL — SIGNIFICANT CHANGE UP (ref 6.6–8.7)
RBC # BLD: 3.7 M/UL — LOW (ref 3.8–5.2)
RBC # FLD: 15.9 % — HIGH (ref 10.3–14.5)
SODIUM SERPL-SCNC: 133 MMOL/L — LOW (ref 135–145)
WBC # BLD: 10.67 K/UL — HIGH (ref 3.8–10.5)
WBC # FLD AUTO: 10.67 K/UL — HIGH (ref 3.8–10.5)

## 2024-07-16 PROCEDURE — 71045 X-RAY EXAM CHEST 1 VIEW: CPT | Mod: 26

## 2024-07-16 PROCEDURE — 99233 SBSQ HOSP IP/OBS HIGH 50: CPT

## 2024-07-16 RX ORDER — DEXTROSE 30 % IN WATER 30 %
25 VIAL (ML) INTRAVENOUS ONCE
Refills: 0 | Status: DISCONTINUED | OUTPATIENT
Start: 2024-07-16 | End: 2024-07-16

## 2024-07-16 RX ORDER — CIPROFLOXACIN HCL 500 MG
400 TABLET ORAL EVERY 12 HOURS
Refills: 0 | Status: DISCONTINUED | OUTPATIENT
Start: 2024-07-16 | End: 2024-07-17

## 2024-07-16 RX ORDER — FERROUS SULFATE 325(65) MG
325 TABLET ORAL DAILY
Refills: 0 | Status: DISCONTINUED | OUTPATIENT
Start: 2024-07-16 | End: 2024-07-17

## 2024-07-16 RX ORDER — INSULIN GLARGINE 100 [IU]/ML
20 INJECTION, SOLUTION SUBCUTANEOUS AT BEDTIME
Refills: 0 | Status: DISCONTINUED | OUTPATIENT
Start: 2024-07-16 | End: 2024-07-17

## 2024-07-16 RX ORDER — DEXTROSE MONOHYDRATE AND SODIUM CHLORIDE 5; .3 G/100ML; G/100ML
1000 INJECTION, SOLUTION INTRAVENOUS
Refills: 0 | Status: DISCONTINUED | OUTPATIENT
Start: 2024-07-16 | End: 2024-07-16

## 2024-07-16 RX ORDER — ATORVASTATIN CALCIUM 20 MG/1
80 TABLET, FILM COATED ORAL AT BEDTIME
Refills: 0 | Status: DISCONTINUED | OUTPATIENT
Start: 2024-07-16 | End: 2024-07-17

## 2024-07-16 RX ORDER — HEPARIN SODIUM 50 [USP'U]/ML
5000 INJECTION, SOLUTION INTRAVENOUS EVERY 12 HOURS
Refills: 0 | Status: DISCONTINUED | OUTPATIENT
Start: 2024-07-16 | End: 2024-07-17

## 2024-07-16 RX ORDER — GLUCAGON HYDROCHLORIDE 1 MG/ML
1 INJECTION, POWDER, FOR SOLUTION INTRAMUSCULAR; INTRAVENOUS; SUBCUTANEOUS ONCE
Refills: 0 | Status: DISCONTINUED | OUTPATIENT
Start: 2024-07-16 | End: 2024-07-17

## 2024-07-16 RX ORDER — INSULIN LISPRO 100 [IU]/ML
INJECTION, SOLUTION SUBCUTANEOUS AT BEDTIME
Refills: 0 | Status: DISCONTINUED | OUTPATIENT
Start: 2024-07-16 | End: 2024-07-17

## 2024-07-16 RX ORDER — DEXTROSE 30 % IN WATER 30 %
12.5 VIAL (ML) INTRAVENOUS ONCE
Refills: 0 | Status: DISCONTINUED | OUTPATIENT
Start: 2024-07-16 | End: 2024-07-17

## 2024-07-16 RX ORDER — DEXTROSE 30 % IN WATER 30 %
15 VIAL (ML) INTRAVENOUS ONCE
Refills: 0 | Status: DISCONTINUED | OUTPATIENT
Start: 2024-07-16 | End: 2024-07-17

## 2024-07-16 RX ORDER — INSULIN LISPRO 100 [IU]/ML
INJECTION, SOLUTION SUBCUTANEOUS AT BEDTIME
Refills: 0 | Status: DISCONTINUED | OUTPATIENT
Start: 2024-07-16 | End: 2024-07-16

## 2024-07-16 RX ORDER — GLUCAGON HYDROCHLORIDE 1 MG/ML
1 INJECTION, POWDER, FOR SOLUTION INTRAMUSCULAR; INTRAVENOUS; SUBCUTANEOUS ONCE
Refills: 0 | Status: DISCONTINUED | OUTPATIENT
Start: 2024-07-16 | End: 2024-07-16

## 2024-07-16 RX ORDER — SODIUM CHLORIDE 0.9 % (FLUSH) 0.9 %
1000 SYRINGE (ML) INJECTION
Refills: 0 | Status: DISCONTINUED | OUTPATIENT
Start: 2024-07-16 | End: 2024-07-16

## 2024-07-16 RX ORDER — INSULIN LISPRO 100 [IU]/ML
INJECTION, SOLUTION SUBCUTANEOUS
Refills: 0 | Status: DISCONTINUED | OUTPATIENT
Start: 2024-07-16 | End: 2024-07-16

## 2024-07-16 RX ORDER — ISOSORBIDE MONONITRATE 30 MG/1
1 TABLET, EXTENDED RELEASE ORAL
Refills: 0 | DISCHARGE

## 2024-07-16 RX ORDER — DEXTROSE MONOHYDRATE AND SODIUM CHLORIDE 5; .3 G/100ML; G/100ML
1000 INJECTION, SOLUTION INTRAVENOUS
Refills: 0 | Status: DISCONTINUED | OUTPATIENT
Start: 2024-07-16 | End: 2024-07-17

## 2024-07-16 RX ORDER — METOPROLOL TARTRATE 50 MG
100 TABLET ORAL DAILY
Refills: 0 | Status: DISCONTINUED | OUTPATIENT
Start: 2024-07-16 | End: 2024-07-17

## 2024-07-16 RX ORDER — PANTOPRAZOLE SODIUM 40 MG/10ML
1 INJECTION, POWDER, FOR SOLUTION INTRAVENOUS
Refills: 0 | DISCHARGE

## 2024-07-16 RX ORDER — INSULIN NPH HUMAN ISOPHANE 100/ML
15 VIAL (ML) SUBCUTANEOUS ONCE
Refills: 0 | Status: COMPLETED | OUTPATIENT
Start: 2024-07-16 | End: 2024-07-16

## 2024-07-16 RX ORDER — CLOPIDOGREL BISULFATE 75 MG/1
75 TABLET, FILM COATED ORAL DAILY
Refills: 0 | Status: DISCONTINUED | OUTPATIENT
Start: 2024-07-16 | End: 2024-07-17

## 2024-07-16 RX ORDER — ISOSORBIDE MONONITRATE 30 MG/1
30 TABLET, EXTENDED RELEASE ORAL DAILY
Refills: 0 | Status: DISCONTINUED | OUTPATIENT
Start: 2024-07-16 | End: 2024-07-17

## 2024-07-16 RX ORDER — DEXTROSE 30 % IN WATER 30 %
15 VIAL (ML) INTRAVENOUS ONCE
Refills: 0 | Status: DISCONTINUED | OUTPATIENT
Start: 2024-07-16 | End: 2024-07-16

## 2024-07-16 RX ORDER — DEXTROSE 30 % IN WATER 30 %
25 VIAL (ML) INTRAVENOUS ONCE
Refills: 0 | Status: DISCONTINUED | OUTPATIENT
Start: 2024-07-16 | End: 2024-07-17

## 2024-07-16 RX ORDER — AMLODIPINE BESYLATE 2.5 MG/1
10 TABLET ORAL DAILY
Refills: 0 | Status: DISCONTINUED | OUTPATIENT
Start: 2024-07-16 | End: 2024-07-17

## 2024-07-16 RX ORDER — GABAPENTIN
300 POWDER (GRAM) MISCELLANEOUS AT BEDTIME
Refills: 0 | Status: DISCONTINUED | OUTPATIENT
Start: 2024-07-16 | End: 2024-07-17

## 2024-07-16 RX ORDER — FUROSEMIDE 10 MG/ML
1 INJECTION INTRAMUSCULAR; INTRAVENOUS
Refills: 0 | DISCHARGE

## 2024-07-16 RX ORDER — DEXTROSE 30 % IN WATER 30 %
12.5 VIAL (ML) INTRAVENOUS ONCE
Refills: 0 | Status: DISCONTINUED | OUTPATIENT
Start: 2024-07-16 | End: 2024-07-16

## 2024-07-16 RX ORDER — SODIUM CHLORIDE 0.9 % (FLUSH) 0.9 %
1000 SYRINGE (ML) INJECTION
Refills: 0 | Status: DISCONTINUED | OUTPATIENT
Start: 2024-07-16 | End: 2024-07-17

## 2024-07-16 RX ORDER — INSULIN LISPRO 100 [IU]/ML
INJECTION, SOLUTION SUBCUTANEOUS
Refills: 0 | Status: DISCONTINUED | OUTPATIENT
Start: 2024-07-16 | End: 2024-07-17

## 2024-07-16 RX ORDER — PANTOPRAZOLE SODIUM 40 MG/10ML
40 INJECTION, POWDER, FOR SOLUTION INTRAVENOUS
Refills: 0 | Status: DISCONTINUED | OUTPATIENT
Start: 2024-07-16 | End: 2024-07-17

## 2024-07-16 RX ORDER — CALCIUM GLUCONATE 98 MG/ML
1 INJECTION, SOLUTION INTRAVENOUS ONCE
Refills: 0 | Status: COMPLETED | OUTPATIENT
Start: 2024-07-16 | End: 2024-07-16

## 2024-07-16 RX ORDER — ASPIRIN 325 MG/1
81 TABLET, FILM COATED ORAL DAILY
Refills: 0 | Status: DISCONTINUED | OUTPATIENT
Start: 2024-07-16 | End: 2024-07-17

## 2024-07-16 RX ADMIN — Medication 100 MILLIGRAM(S): at 06:04

## 2024-07-16 RX ADMIN — AMLODIPINE BESYLATE 10 MILLIGRAM(S): 2.5 TABLET ORAL at 05:51

## 2024-07-16 RX ADMIN — Medication 200 MILLIGRAM(S): at 18:22

## 2024-07-16 RX ADMIN — CALCIUM GLUCONATE 100 GRAM(S): 98 INJECTION, SOLUTION INTRAVENOUS at 03:19

## 2024-07-16 RX ADMIN — INSULIN GLARGINE 20 UNIT(S): 100 INJECTION, SOLUTION SUBCUTANEOUS at 21:52

## 2024-07-16 RX ADMIN — Medication 200 MILLIGRAM(S): at 05:49

## 2024-07-16 RX ADMIN — INSULIN LISPRO 2: 100 INJECTION, SOLUTION SUBCUTANEOUS at 18:05

## 2024-07-16 RX ADMIN — CLOPIDOGREL BISULFATE 75 MILLIGRAM(S): 75 TABLET, FILM COATED ORAL at 12:49

## 2024-07-16 RX ADMIN — Medication 325 MILLIGRAM(S): at 12:50

## 2024-07-16 RX ADMIN — Medication 15 UNIT(S): at 10:43

## 2024-07-16 RX ADMIN — HEPARIN SODIUM 5000 UNIT(S): 50 INJECTION, SOLUTION INTRAVENOUS at 18:23

## 2024-07-16 RX ADMIN — ISOSORBIDE MONONITRATE 30 MILLIGRAM(S): 30 TABLET, EXTENDED RELEASE ORAL at 12:50

## 2024-07-16 RX ADMIN — PANTOPRAZOLE SODIUM 40 MILLIGRAM(S): 40 INJECTION, POWDER, FOR SOLUTION INTRAVENOUS at 06:04

## 2024-07-16 RX ADMIN — ATORVASTATIN CALCIUM 80 MILLIGRAM(S): 20 TABLET, FILM COATED ORAL at 21:47

## 2024-07-16 RX ADMIN — Medication 75 MILLILITER(S): at 03:05

## 2024-07-16 RX ADMIN — Medication 300 MILLIGRAM(S): at 21:47

## 2024-07-16 RX ADMIN — HEPARIN SODIUM 5000 UNIT(S): 50 INJECTION, SOLUTION INTRAVENOUS at 05:49

## 2024-07-16 RX ADMIN — INSULIN LISPRO 12: 100 INJECTION, SOLUTION SUBCUTANEOUS at 12:50

## 2024-07-16 RX ADMIN — ASPIRIN 81 MILLIGRAM(S): 325 TABLET, FILM COATED ORAL at 12:50

## 2024-07-16 NOTE — PROGRESS NOTE ADULT - SUBJECTIVE AND OBJECTIVE BOX
SHIREEN CASH    [id number]    History:      seen this am  readmitted due to colitis  no acute overnight events overnight     Vital Signs Last 24 Hrs  T(C): 36.9 (16 Jul 2024 05:30), Max: 37 (15 Jul 2024 13:05)  T(F): 98.4 (16 Jul 2024 05:30), Max: 98.6 (15 Jul 2024 13:05)  HR: 64 (16 Jul 2024 05:30) (60 - 65)  BP: 146/63 (16 Jul 2024 05:30) (114/63 - 147/67)  BP(mean): --  RR: 18 (16 Jul 2024 05:30) (18 - 20)  SpO2: 97% (16 Jul 2024 05:30) (94% - 97%)    Parameters below as of 16 Jul 2024 05:30  Patient On (Oxygen Delivery Method): room air      I&O's Summary                            9.6    10.49 )-----------( 461      ( 15 Jul 2024 18:50 )             28.8     07-15    133<L>  |  95<L>  |  14.4  ----------------------------<  150<H>  3.7   |  20.0<L>  |  1.33<H>    Ca    6.7<L>      15 Jul 2024 18:50    TPro  6.7  /  Alb  3.3  /  TBili  0.4  /  DBili  x   /  AST  29  /  ALT  60<H>  /  AlkPhos  355<H>  07-15      MEDICATIONS  (STANDING):  amLODIPine   Tablet 10 milliGRAM(s) Oral daily  aspirin enteric coated 81 milliGRAM(s) Oral daily  atorvastatin 80 milliGRAM(s) Oral at bedtime  ciprofloxacin   IVPB 400 milliGRAM(s) IV Intermittent every 12 hours  clopidogrel Tablet 75 milliGRAM(s) Oral daily  dextrose 5%. 1000 milliLiter(s) (100 mL/Hr) IV Continuous <Continuous>  dextrose 5%. 1000 milliLiter(s) (50 mL/Hr) IV Continuous <Continuous>  dextrose 50% Injectable 12.5 Gram(s) IV Push once  dextrose 50% Injectable 25 Gram(s) IV Push once  dextrose 50% Injectable 25 Gram(s) IV Push once  ferrous    sulfate 325 milliGRAM(s) Oral daily  gabapentin 300 milliGRAM(s) Oral at bedtime  glucagon  Injectable 1 milliGRAM(s) IntraMuscular once  heparin   Injectable 5000 Unit(s) SubCutaneous every 12 hours  insulin lispro (ADMELOG) corrective regimen sliding scale   SubCutaneous at bedtime  insulin lispro (ADMELOG) corrective regimen sliding scale   SubCutaneous three times a day before meals  isosorbide   mononitrate ER Tablet (IMDUR) 30 milliGRAM(s) Oral daily  metoprolol succinate  milliGRAM(s) Oral daily  pantoprazole    Tablet 40 milliGRAM(s) Oral before breakfast  sodium chloride 0.9%. 1000 milliLiter(s) (75 mL/Hr) IV Continuous <Continuous>    MEDICATIONS  (PRN):  acetaminophen     Tablet .. 650 milliGRAM(s) Oral every 6 hours PRN Temp greater or equal to 38C (100.4F), Mild Pain (1 - 3), Moderate Pain (4 - 6)  dextrose Oral Gel 15 Gram(s) Oral once PRN Blood Glucose LESS THAN 70 milliGRAM(s)/deciliter  ondansetron Injectable 4 milliGRAM(s) IV Push every 6 hours PRN Nausea and/or Vomiting      Physical exam:     limited exam due to contact percautions  alert and oriented   non labored breathing  no gross abdominal distention  rle with wound vac to the tibial region, functioning\    Primary Assessment:    s/p rle revascularization and wound debridement   readmitted due to suspected colitis   •   Plan:   • will change wound vac either today or tomorrow  - no limitation on physical activity  - will follow peripherally    
Hospitalist Daily Progress Note    Chief Complaint:  Patient is a 73y old  Female who presents with a chief complaint of Colitis due to EAEC E. coli (16 Jul 2024 07:02)      SUBJECTIVE / OVERNIGHT EVENTS:  Patient was seen and examined at bedside. Pashto translated by Jimena parkinson. States abd pain is improving. States to be feeling better.  Patient denies chest pain, SOB,   fever, chills, dysuria or any other complaints. All remainder ROS negative.     MEDICATIONS  (STANDING):  amLODIPine   Tablet 10 milliGRAM(s) Oral daily  aspirin enteric coated 81 milliGRAM(s) Oral daily  atorvastatin 80 milliGRAM(s) Oral at bedtime  ciprofloxacin   IVPB 400 milliGRAM(s) IV Intermittent every 12 hours  clopidogrel Tablet 75 milliGRAM(s) Oral daily  dextrose 5%. 1000 milliLiter(s) (50 mL/Hr) IV Continuous <Continuous>  dextrose 5%. 1000 milliLiter(s) (100 mL/Hr) IV Continuous <Continuous>  dextrose 50% Injectable 25 Gram(s) IV Push once  dextrose 50% Injectable 12.5 Gram(s) IV Push once  dextrose 50% Injectable 25 Gram(s) IV Push once  ferrous    sulfate 325 milliGRAM(s) Oral daily  gabapentin 300 milliGRAM(s) Oral at bedtime  glucagon  Injectable 1 milliGRAM(s) IntraMuscular once  heparin   Injectable 5000 Unit(s) SubCutaneous every 12 hours  insulin glargine Injectable (LANTUS) 20 Unit(s) SubCutaneous at bedtime  insulin lispro (ADMELOG) corrective regimen sliding scale   SubCutaneous at bedtime  insulin lispro (ADMELOG) corrective regimen sliding scale   SubCutaneous three times a day before meals  isosorbide   mononitrate ER Tablet (IMDUR) 30 milliGRAM(s) Oral daily  metoprolol succinate  milliGRAM(s) Oral daily  pantoprazole    Tablet 40 milliGRAM(s) Oral before breakfast  sodium chloride 0.9%. 1000 milliLiter(s) (75 mL/Hr) IV Continuous <Continuous>    MEDICATIONS  (PRN):  acetaminophen     Tablet .. 650 milliGRAM(s) Oral every 6 hours PRN Temp greater or equal to 38C (100.4F), Mild Pain (1 - 3), Moderate Pain (4 - 6)  dextrose Oral Gel 15 Gram(s) Oral once PRN Blood Glucose LESS THAN 70 milliGRAM(s)/deciliter  ondansetron Injectable 4 milliGRAM(s) IV Push every 6 hours PRN Nausea and/or Vomiting        I&O's Summary    16 Jul 2024 07:01  -  16 Jul 2024 13:28  --------------------------------------------------------  IN: 0 mL / OUT: 2 mL / NET: -2 mL        PHYSICAL EXAM:  Vital Signs Last 24 Hrs  T(C): 36.8 (16 Jul 2024 11:23), Max: 37 (15 Jul 2024 20:24)  T(F): 98.2 (16 Jul 2024 11:23), Max: 98.6 (15 Jul 2024 20:24)  HR: 65 (16 Jul 2024 11:23) (60 - 71)  BP: 143/61 (16 Jul 2024 11:23) (123/56 - 149/69)  BP(mean): --  RR: 18 (16 Jul 2024 11:23) (18 - 20)  SpO2: 100% (16 Jul 2024 11:23) (94% - 100%)    Parameters below as of 16 Jul 2024 11:23  Patient On (Oxygen Delivery Method): room air      Constitutional: NAD, Resting  ENT: Supple, No JVD  Lungs: CTA B/L, Non-labored breathing  Cardio: RRR, S1/S2, No murmur  Abdomen: Soft, Nontender, Nondistended; Bowel sounds present  Extremities: No calf tenderness, No pitting edema  Musculoskeletal:   No joint swelling  Psych: Calm, cooperative affect appropriate  Neuro: Awake and alert, oriented x 4  Skin: No rashes; no palpable lesions    LABS:                        9.6    10.49 )-----------( 461      ( 15 Jul 2024 18:50 )             28.8     07-15    133<L>  |  95<L>  |  14.4  ----------------------------<  150<H>  3.7   |  20.0<L>  |  1.33<H>    Ca    6.7<L>      15 Jul 2024 18:50    TPro  6.7  /  Alb  3.3  /  TBili  0.4  /  DBili  x   /  AST  29  /  ALT  60<H>  /  AlkPhos  355<H>  07-15    PT/INR - ( 15 Jul 2024 18:50 )   PT: 12.0 sec;   INR: 1.08 ratio         PTT - ( 15 Jul 2024 18:50 )  PTT:33.7 sec      Urinalysis Basic - ( 15 Jul 2024 18:50 )    Color: x / Appearance: x / SG: x / pH: x  Gluc: 150 mg/dL / Ketone: x  / Bili: x / Urobili: x   Blood: x / Protein: x / Nitrite: x   Leuk Esterase: x / RBC: x / WBC x   Sq Epi: x / Non Sq Epi: x / Bacteria: x        CAPILLARY BLOOD GLUCOSE      POCT Blood Glucose.: 490 mg/dL (16 Jul 2024 12:17)  POCT Blood Glucose.: 392 mg/dL (16 Jul 2024 10:00)        RADIOLOGY REVIEWED

## 2024-07-16 NOTE — PATIENT PROFILE ADULT - FALL HARM RISK - HARM RISK INTERVENTIONS

## 2024-07-16 NOTE — PROGRESS NOTE ADULT - ASSESSMENT
73yoF hx CAD s/p PCI, PAD with chronic RLE wound s/p debridement on 7/9 with wound vac present, HFpEF, HTN, s/p PPM presenting with few days of generalized, sharp and crampy abdominal pain associated with watery, non-bloody diarrhea    Colitis due to EAEC E. coli  -CT A/P w/ fluid filled colon, diffuse colonic wall thickening  -GI PCR + with EAEC, C. diff negative   -s/p cipro given by ED, will continue  -Clear liquid diet, advance as tolerated   -Not reporting any bloody diarrhea, will resume DAPT for CAD/PAD hx    Elevated LFTs  -Elevated alk phos noted, probably related to gallbladder sludge  -Seen by surgery re: ? cholecystitis on imaging, deemed not to have active cholecystitis     BRADY, hyponatremia, hypocalcemia   -Mild, due to GI losses  -Corrected Ca 7.3, will replete w/ IV calcium gluconate  -s/p NS 500cc bolus given by ED  -Transfer to telemetry for hypocalcemia  -Repeat labs    Anemia  -Chronic, either due to AOCD or iron deficiency   -Hgb around baseline of 9, monitor  -Resume ferrous sulfate    Hx HFpEF (55-60%)  -Likely hypovolemic from ongoing GI losses  -CT notes small b/l pleural effusions, not hypoxic or in respiratory distress  -Monitor volume status carefully while on IVF as above  -Order CXR to further assess pleural effusion  -Hold furosemide     Chronic RLE wound s/p debridement   -Has wound vac present   -Vascular surgery recs appreciated    Hx CAD, PAD, HTN  -ASA, Plavix, atorvastatin, isosorbide, metoprolol, amlodipine    Hx DM  -Lantus  -NPH x 1 dose  -ISS     Prophylactic measure   -Heparin 5000 units BID    Dispo: Pending improvement in GI symptoms

## 2024-07-17 ENCOUNTER — TRANSCRIPTION ENCOUNTER (OUTPATIENT)
Age: 74
End: 2024-07-17

## 2024-07-17 VITALS
HEART RATE: 64 BPM | SYSTOLIC BLOOD PRESSURE: 142 MMHG | RESPIRATION RATE: 18 BRPM | OXYGEN SATURATION: 97 % | DIASTOLIC BLOOD PRESSURE: 70 MMHG | TEMPERATURE: 98 F

## 2024-07-17 LAB
ALBUMIN SERPL ELPH-MCNC: 3.2 G/DL — LOW (ref 3.3–5.2)
ALP SERPL-CCNC: 270 U/L — HIGH (ref 40–120)
ALT FLD-CCNC: 31 U/L — SIGNIFICANT CHANGE UP
ANION GAP SERPL CALC-SCNC: 13 MMOL/L — SIGNIFICANT CHANGE UP (ref 5–17)
AST SERPL-CCNC: 12 U/L — SIGNIFICANT CHANGE UP
BASOPHILS # BLD AUTO: 0.03 K/UL — SIGNIFICANT CHANGE UP (ref 0–0.2)
BASOPHILS NFR BLD AUTO: 0.3 % — SIGNIFICANT CHANGE UP (ref 0–2)
BILIRUB DIRECT SERPL-MCNC: 0.2 MG/DL — SIGNIFICANT CHANGE UP (ref 0–0.3)
BILIRUB INDIRECT FLD-MCNC: 0.4 MG/DL — SIGNIFICANT CHANGE UP (ref 0.2–1)
BILIRUB SERPL-MCNC: 0.6 MG/DL — SIGNIFICANT CHANGE UP (ref 0.4–2)
BUN SERPL-MCNC: 6.6 MG/DL — LOW (ref 8–20)
CALCIUM SERPL-MCNC: 6.6 MG/DL — LOW (ref 8.4–10.5)
CHLORIDE SERPL-SCNC: 108 MMOL/L — SIGNIFICANT CHANGE UP (ref 96–108)
CO2 SERPL-SCNC: 19 MMOL/L — LOW (ref 22–29)
CREAT SERPL-MCNC: 1.07 MG/DL — SIGNIFICANT CHANGE UP (ref 0.5–1.3)
EGFR: 56 ML/MIN/1.73M2 — LOW
EOSINOPHIL # BLD AUTO: 0.39 K/UL — SIGNIFICANT CHANGE UP (ref 0–0.5)
EOSINOPHIL NFR BLD AUTO: 4.5 % — SIGNIFICANT CHANGE UP (ref 0–6)
GLUCOSE BLDC GLUCOMTR-MCNC: 176 MG/DL — HIGH (ref 70–99)
GLUCOSE BLDC GLUCOMTR-MCNC: 189 MG/DL — HIGH (ref 70–99)
GLUCOSE SERPL-MCNC: 198 MG/DL — HIGH (ref 70–99)
HCT VFR BLD CALC: 27.7 % — LOW (ref 34.5–45)
HGB BLD-MCNC: 9.1 G/DL — LOW (ref 11.5–15.5)
IMM GRANULOCYTES NFR BLD AUTO: 0.9 % — SIGNIFICANT CHANGE UP (ref 0–0.9)
LYMPHOCYTES # BLD AUTO: 2.03 K/UL — SIGNIFICANT CHANGE UP (ref 1–3.3)
LYMPHOCYTES # BLD AUTO: 23.6 % — SIGNIFICANT CHANGE UP (ref 13–44)
MCHC RBC-ENTMCNC: 27.6 PG — SIGNIFICANT CHANGE UP (ref 27–34)
MCHC RBC-ENTMCNC: 32.9 GM/DL — SIGNIFICANT CHANGE UP (ref 32–36)
MCV RBC AUTO: 83.9 FL — SIGNIFICANT CHANGE UP (ref 80–100)
MONOCYTES # BLD AUTO: 1.17 K/UL — HIGH (ref 0–0.9)
MONOCYTES NFR BLD AUTO: 13.6 % — SIGNIFICANT CHANGE UP (ref 2–14)
NEUTROPHILS # BLD AUTO: 4.9 K/UL — SIGNIFICANT CHANGE UP (ref 1.8–7.4)
NEUTROPHILS NFR BLD AUTO: 57.1 % — SIGNIFICANT CHANGE UP (ref 43–77)
PLATELET # BLD AUTO: 493 K/UL — HIGH (ref 150–400)
POTASSIUM SERPL-MCNC: 4.1 MMOL/L — SIGNIFICANT CHANGE UP (ref 3.5–5.3)
POTASSIUM SERPL-SCNC: 4.1 MMOL/L — SIGNIFICANT CHANGE UP (ref 3.5–5.3)
PROT SERPL-MCNC: 6.5 G/DL — LOW (ref 6.6–8.7)
RBC # BLD: 3.3 M/UL — LOW (ref 3.8–5.2)
RBC # FLD: 15.8 % — HIGH (ref 10.3–14.5)
SODIUM SERPL-SCNC: 140 MMOL/L — SIGNIFICANT CHANGE UP (ref 135–145)
WBC # BLD: 8.6 K/UL — SIGNIFICANT CHANGE UP (ref 3.8–10.5)
WBC # FLD AUTO: 8.6 K/UL — SIGNIFICANT CHANGE UP (ref 3.8–10.5)

## 2024-07-17 PROCEDURE — 93005 ELECTROCARDIOGRAM TRACING: CPT

## 2024-07-17 PROCEDURE — 80048 BASIC METABOLIC PNL TOTAL CA: CPT

## 2024-07-17 PROCEDURE — 85025 COMPLETE CBC W/AUTO DIFF WBC: CPT

## 2024-07-17 PROCEDURE — 83735 ASSAY OF MAGNESIUM: CPT

## 2024-07-17 PROCEDURE — 99285 EMERGENCY DEPT VISIT HI MDM: CPT | Mod: 25

## 2024-07-17 PROCEDURE — 80076 HEPATIC FUNCTION PANEL: CPT

## 2024-07-17 PROCEDURE — 99239 HOSP IP/OBS DSCHRG MGMT >30: CPT

## 2024-07-17 PROCEDURE — 87493 C DIFF AMPLIFIED PROBE: CPT

## 2024-07-17 PROCEDURE — 36415 COLL VENOUS BLD VENIPUNCTURE: CPT

## 2024-07-17 PROCEDURE — 87507 IADNA-DNA/RNA PROBE TQ 12-25: CPT

## 2024-07-17 PROCEDURE — 71045 X-RAY EXAM CHEST 1 VIEW: CPT

## 2024-07-17 PROCEDURE — 83690 ASSAY OF LIPASE: CPT

## 2024-07-17 PROCEDURE — 85610 PROTHROMBIN TIME: CPT

## 2024-07-17 PROCEDURE — 76705 ECHO EXAM OF ABDOMEN: CPT

## 2024-07-17 PROCEDURE — 85730 THROMBOPLASTIN TIME PARTIAL: CPT

## 2024-07-17 PROCEDURE — 80053 COMPREHEN METABOLIC PANEL: CPT

## 2024-07-17 PROCEDURE — 74177 CT ABD & PELVIS W/CONTRAST: CPT | Mod: MC

## 2024-07-17 PROCEDURE — 82962 GLUCOSE BLOOD TEST: CPT

## 2024-07-17 RX ORDER — CIPROFLOXACIN HCL 500 MG
1 TABLET ORAL
Qty: 16 | Refills: 0
Start: 2024-07-17 | End: 2024-07-24

## 2024-07-17 RX ORDER — MORPHINE SULFATE 100 MG/1
2 TABLET, EXTENDED RELEASE ORAL ONCE
Refills: 0 | Status: DISCONTINUED | OUTPATIENT
Start: 2024-07-17 | End: 2024-07-17

## 2024-07-17 RX ADMIN — PANTOPRAZOLE SODIUM 40 MILLIGRAM(S): 40 INJECTION, POWDER, FOR SOLUTION INTRAVENOUS at 07:57

## 2024-07-17 RX ADMIN — HEPARIN SODIUM 5000 UNIT(S): 50 INJECTION, SOLUTION INTRAVENOUS at 05:23

## 2024-07-17 RX ADMIN — Medication 650 MILLIGRAM(S): at 07:57

## 2024-07-17 RX ADMIN — CLOPIDOGREL BISULFATE 75 MILLIGRAM(S): 75 TABLET, FILM COATED ORAL at 12:44

## 2024-07-17 RX ADMIN — INSULIN LISPRO 2: 100 INJECTION, SOLUTION SUBCUTANEOUS at 12:45

## 2024-07-17 RX ADMIN — ASPIRIN 81 MILLIGRAM(S): 325 TABLET, FILM COATED ORAL at 12:44

## 2024-07-17 RX ADMIN — Medication 100 MILLIGRAM(S): at 05:43

## 2024-07-17 RX ADMIN — ISOSORBIDE MONONITRATE 30 MILLIGRAM(S): 30 TABLET, EXTENDED RELEASE ORAL at 12:49

## 2024-07-17 RX ADMIN — Medication 200 MILLIGRAM(S): at 05:23

## 2024-07-17 RX ADMIN — INSULIN LISPRO 2: 100 INJECTION, SOLUTION SUBCUTANEOUS at 08:45

## 2024-07-17 RX ADMIN — AMLODIPINE BESYLATE 10 MILLIGRAM(S): 2.5 TABLET ORAL at 05:23

## 2024-07-17 RX ADMIN — MORPHINE SULFATE 2 MILLIGRAM(S): 100 TABLET, EXTENDED RELEASE ORAL at 12:49

## 2024-07-17 RX ADMIN — Medication 325 MILLIGRAM(S): at 12:44

## 2024-07-17 NOTE — DISCHARGE NOTE PROVIDER - CARE PROVIDER_API CALL
Annita Santos  Surgery  [address], [address]  [place] [address]  Phone: [phone]  Fax: [phone]  Follow Up Time: 1 week    [facility] (PCP),   Phone: (   )    -  Fax: (   )    -  Follow Up Time:
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
## 2024-07-17 NOTE — DISCHARGE NOTE PROVIDER - HOSPITAL COURSE
73 year old female with hx of CAD s/p PCI, PAD with chronic RLE wound s/p debridement on 7/9 with wound vac present, HFpEF, HTN, s/p PPM presenting with few days of /generalized, sharp and crampy abdominal pain associated with watery, non-bloody diarrhea. CT A/P 7/15/24 shows fluid filled colon with diffuse colonic wall thickening, small b/l pleural effusions. GI PCR panel positive for EAEC e. coli, negative for c. diff, given cipro and placed on clear liquid diet. Vascular surgery consulted to rule out cholecystitis, no evidence on history or physical exam, or imaging of acute cholecystitis. Elevated LFTs, elevated alk phos possibly due to gallbladder sludge. Mild BRADY with hyponatremia and hypocalcemia due to GI losses, given IV calcium gluconate, NS 500cc bolus given in ED, labs monitored. Chronic anemia, baseline hgb 9, due to anemia of chronic disease or iron deficiency monitored, ferrous sulfate resumed. Patient has history of HFpEF and likely hypovolemic due to ongoing GI losses, volume status monitored while on IVF, furosemide help. RLE wound debridement 7/9 with wound vac, vascular surgery plan to change wound vac. Medically optimized for discharge. Patient is now medically stable for discharge with appropriate outpatient follow up.          73 year old female with hx of CAD s/p PCI, PAD with chronic RLE wound s/p debridement on 7/9 with wound vac present, HFpEF, HTN, s/p PPM presenting with few days of /generalized, sharp and crampy abdominal pain associated with watery, non-bloody diarrhea. CT A/P 7/15/24 shows fluid filled colon with diffuse colonic wall thickening, small b/l pleural effusions. GI PCR panel positive for EAEC e. coli, negative for c. diff, given cipro and placed on clear liquid diet. Vascular surgery consulted to rule out cholecystitis, no evidence on history or physical exam, or imaging of acute cholecystitis. Elevated LFTs, elevated alk phos possibly due to gallbladder sludge. Mild BRADY with hyponatremia and hypocalcemia due to GI losses, given IV calcium gluconate, NS 500cc bolus given in ED, labs monitored. Chronic anemia, baseline hgb 9, due to anemia of chronic disease or iron deficiency monitored, ferrous sulfate resumed. Patient has history of HFpEF and likely hypovolemic due to ongoing GI losses, volume status monitored while on IVF, furosemide help. RLE wound debridement 7/9 with wound vac, vascular surgery plan to change wound vac. Medically optimized for discharge. Patient is now medically stable for discharge with appropriate outpatient follow up.     Wound vac changes q3 days - Last change today on 7/17.

## 2024-07-17 NOTE — DISCHARGE NOTE PROVIDER - PROVIDER TOKENS
PROVIDER:[TOKEN:[[id number]],FOLLOWUP:[1 week]],FREE:[LAST:[[facility] (PCP)],PHONE:[(   )    -],FAX:[(   )    -]]

## 2024-07-17 NOTE — DISCHARGE NOTE NURSING/CASE MANAGEMENT/SOCIAL WORK - PATIENT PORTAL LINK FT
You can access the FollowMyHealth Patient Portal offered by [facility] by registering at the following website: [email or web address]. By joining [employer]’s FollowMyHealth portal, you will also be able to view your health information using other applications (apps) compatible with our system.

## 2024-07-17 NOTE — DISCHARGE NOTE PROVIDER - ATTENDING DISCHARGE PHYSICAL EXAMINATION:
Grenadian translated by Jimena at bedside    PHYSICAL EXAM:  Vital Signs Last 24 Hrs  T(F): 98.4 (17 Jul 2024 11:30), Max: 98.4 (17 Jul 2024 11:30)  HR: 66 (17 Jul 2024 11:30) (65 - 74)  BP: 142/68 (17 Jul 2024 11:30) (131/71 - 159/69)  RR: 18 (17 Jul 2024 11:30) (18 - 20)  SpO2: 98% (17 Jul 2024 11:30) (96% - 100%)    GENERAL: NAD, Resting in bed  Eyes: EOMI, PERRLA  ENMT: Conjunctiva and sclera clear; supple neck, No JVD  Cardiovascular: S1,S2, RRR, No murmur  Respiratory: CTA B/L, Non-labored breathing  GI: Bowel sounds present; Soft, Nontender, Nondistended. No hepatomegaly  Genitourinary: Deferred  Skin:  no breakdowns, ulcers or discharge, No rashes or lesions, RLE wound vac  Neurology: Alert & Oriented X3, non-focal and spontaneous movements of all extremities, CN 2 to 12 grossly intact   Psych: Appropriate mood and affect, calm, pleasant, Responds appropriately to questions

## 2024-07-17 NOTE — DISCHARGE NOTE PROVIDER - NSDCFUSCHEDAPPT_GEN_ALL_CORE_FT
Travis Allen  [facility] Physician [facility]  CARDIOLOGY [address]  Scheduled Appointment: 10/04/2024

## 2024-07-17 NOTE — DISCHARGE NOTE PROVIDER - NSDCFUADDAPPT_GEN_ALL_CORE_FT
APPTS ARE READY TO BE MADE: [X] YES    Best Family or Patient Contact (if needed):    Additional Information about above appointments (if needed):    1: Annita Santos in 1 week  2: PCP in 1 week     Other comments or requests:

## 2024-07-17 NOTE — DISCHARGE NOTE PROVIDER - NSDCMRMEDTOKEN_GEN_ALL_CORE_FT
amLODIPine 10 mg oral tablet: 1 tab(s) orally once a day  aspirin 81 mg oral delayed release tablet: 1 tab(s) orally once a day  atorvastatin 80 mg oral tablet: 1 tab(s) orally once a day (at bedtime)  clopidogrel 75 mg oral tablet: 1 tab(s) orally once a day  ferrous sulfate 325 mg (65 mg elemental iron) oral tablet: 1 tab(s) orally 3 times a day  furosemide 40 mg oral tablet: 1 tab(s) orally once a day  gabapentin 300 mg oral capsule: 1 cap(s) orally once a day (at bedtime)  insulin glargine 100 units/mL subcutaneous solution: 30 unit(s) subcutaneous once a day (at bedtime)  isosorbide mononitrate 30 mg oral tablet, extended release: 1 tab(s) orally once a day  metFORMIN 1000 mg oral tablet: 1 tab(s) orally 2 times a day (with meals)  metoprolol succinate 100 mg oral tablet, extended release: 1 tab(s) orally once a day  pantoprazole 40 mg oral delayed release tablet: 1 tab(s) orally once a day  sulfamethoxazole-trimethoprim 800 mg-160 mg oral tablet: 1 tab(s) orally every 12 hours  Tylenol 325 mg oral capsule: 1 cap(s) orally every 6 hours as needed for  mild pain   amLODIPine 10 mg oral tablet: 1 tab(s) orally once a day  aspirin 81 mg oral delayed release tablet: 1 tab(s) orally once a day  atorvastatin 80 mg oral tablet: 1 tab(s) orally once a day (at bedtime)  Cipro 500 mg oral tablet: 1 tab(s) orally 2 times a day  clopidogrel 75 mg oral tablet: 1 tab(s) orally once a day  ferrous sulfate 325 mg (65 mg elemental iron) oral tablet: 1 tab(s) orally 3 times a day  furosemide 40 mg oral tablet: 1 tab(s) orally once a day  gabapentin 300 mg oral capsule: 1 cap(s) orally once a day (at bedtime)  insulin glargine 100 units/mL subcutaneous solution: 30 unit(s) subcutaneous once a day (at bedtime)  isosorbide mononitrate 30 mg oral tablet, extended release: 1 tab(s) orally once a day  metFORMIN 1000 mg oral tablet: 1 tab(s) orally 2 times a day (with meals)  metoprolol succinate 100 mg oral tablet, extended release: 1 tab(s) orally once a day  pantoprazole 40 mg oral delayed release tablet: 1 tab(s) orally once a day  Tylenol 325 mg oral capsule: 1 cap(s) orally every 6 hours as needed for  mild pain

## 2024-07-17 NOTE — DISCHARGE NOTE NURSING/CASE MANAGEMENT/SOCIAL WORK - NSDCPEFALRISK_GEN_ALL_CORE
For information on Fall & Injury Prevention, visit: [email or web address] OR  [email or web address] OR  https://www.cdc.gov/steadi/patient.html

## 2024-07-18 NOTE — CHART NOTE - NSCHARTNOTEFT_GEN_A_CORE
Patient was outreached but did not answer. A voicemail was left for the patient to return our call.
Wound V.A.C Dressing Change Note    Date: 07-17-24    Wound Etiology: post-surgical    Wound Location:  Right distal anterior leg    Wound Description: superficial wound to subcutaneous tissue with fibrinous exudate.     Wound Size: 3 cm vertical x 2 cm horizontal x  0.2cm Deep.     Vac Dressing Used: Black sponge    Vac settings: -125mm Hg, continuous    Continue with home VAC change and follow with Dr. Santos
Patient should not take a shower with or submerge wound vac on. Patient should take sponge baths while being treated with the wound vac at home.

## 2024-08-07 ENCOUNTER — APPOINTMENT (OUTPATIENT)
Dept: VASCULAR SURGERY | Facility: CLINIC | Age: 74
End: 2024-08-07

## 2024-08-07 PROBLEM — L97.919 ULCER OF RIGHT LOWER EXTREMITY: Status: ACTIVE | Noted: 2024-08-07

## 2024-08-07 PROCEDURE — 99024 POSTOP FOLLOW-UP VISIT: CPT

## 2024-08-07 PROCEDURE — 93971 EXTREMITY STUDY: CPT

## 2024-08-07 NOTE — PHYSICAL EXAM
[Respiratory Effort] : normal respiratory effort [Normal Rate and Rhythm] : normal rate and rhythm [Abdomen Tenderness] : ~T ~M No abdominal tenderness [No Rash or Lesion] : No rash or lesion [Alert] : alert [Oriented to Person] : oriented to person [Oriented to Place] : oriented to place [Oriented to Time] : oriented to time [Calm] : calm [de-identified] : no acute distress noted, [de-identified] : normocephalic, [FreeTextEntry1] : DP and peroneal signal present, 5cm and 3cm  ulceration to anterior shin on right mostly healthy granulation tissue
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
## 2024-08-23 ENCOUNTER — OUTPATIENT (OUTPATIENT)
Dept: OUTPATIENT SERVICES | Facility: HOSPITAL | Age: 74
LOS: 1 days | End: 2024-08-23
Payer: MEDICARE

## 2024-08-23 DIAGNOSIS — H26.9 UNSPECIFIED CATARACT: Chronic | ICD-10-CM

## 2024-08-23 DIAGNOSIS — Z86.011 PERSONAL HISTORY OF BENIGN NEOPLASM OF THE BRAIN: Chronic | ICD-10-CM

## 2024-08-23 DIAGNOSIS — L97.809 NON-PRESSURE CHRONIC ULCER OF OTHER PART OF UNSPECIFIED LOWER LEG WITH UNSPECIFIED SEVERITY: ICD-10-CM

## 2024-08-23 DIAGNOSIS — Z98.890 OTHER SPECIFIED POSTPROCEDURAL STATES: Chronic | ICD-10-CM

## 2024-08-23 DIAGNOSIS — Z95.0 PRESENCE OF CARDIAC PACEMAKER: Chronic | ICD-10-CM

## 2024-08-23 PROCEDURE — 99203 OFFICE O/P NEW LOW 30 MIN: CPT | Mod: 25

## 2024-08-23 PROCEDURE — 29580 STRAPPING UNNA BOOT: CPT | Mod: RT

## 2024-08-27 DIAGNOSIS — Z79.4 LONG TERM (CURRENT) USE OF INSULIN: ICD-10-CM

## 2024-08-27 DIAGNOSIS — E11.40 TYPE 2 DIABETES MELLITUS WITH DIABETIC NEUROPATHY, UNSPECIFIED: ICD-10-CM

## 2024-08-27 DIAGNOSIS — L97.812 NON-PRESSURE CHRONIC ULCER OF OTHER PART OF RIGHT LOWER LEG WITH FAT LAYER EXPOSED: ICD-10-CM

## 2024-08-27 DIAGNOSIS — M06.9 RHEUMATOID ARTHRITIS, UNSPECIFIED: ICD-10-CM

## 2024-08-27 DIAGNOSIS — M79.661 PAIN IN RIGHT LOWER LEG: ICD-10-CM

## 2024-08-27 DIAGNOSIS — I10 ESSENTIAL (PRIMARY) HYPERTENSION: ICD-10-CM

## 2024-08-27 DIAGNOSIS — I87.311 CHRONIC VENOUS HYPERTENSION (IDIOPATHIC) WITH ULCER OF RIGHT LOWER EXTREMITY: ICD-10-CM

## 2024-08-29 ENCOUNTER — OUTPATIENT (OUTPATIENT)
Dept: OUTPATIENT SERVICES | Facility: HOSPITAL | Age: 74
LOS: 1 days | End: 2024-08-29
Payer: MEDICARE

## 2024-08-29 DIAGNOSIS — H26.9 UNSPECIFIED CATARACT: Chronic | ICD-10-CM

## 2024-08-29 DIAGNOSIS — Z86.011 PERSONAL HISTORY OF BENIGN NEOPLASM OF THE BRAIN: Chronic | ICD-10-CM

## 2024-08-29 DIAGNOSIS — L97.809 NON-PRESSURE CHRONIC ULCER OF OTHER PART OF UNSPECIFIED LOWER LEG WITH UNSPECIFIED SEVERITY: ICD-10-CM

## 2024-08-29 DIAGNOSIS — Z98.890 OTHER SPECIFIED POSTPROCEDURAL STATES: Chronic | ICD-10-CM

## 2024-08-29 DIAGNOSIS — Z95.0 PRESENCE OF CARDIAC PACEMAKER: Chronic | ICD-10-CM

## 2024-08-29 PROCEDURE — ZZZZZ: CPT

## 2024-08-29 PROCEDURE — 29580 STRAPPING UNNA BOOT: CPT | Mod: 59,RT

## 2024-08-29 PROCEDURE — 11042 DBRDMT SUBQ TIS 1ST 20SQCM/<: CPT

## 2024-09-05 DIAGNOSIS — L97.812 NON-PRESSURE CHRONIC ULCER OF OTHER PART OF RIGHT LOWER LEG WITH FAT LAYER EXPOSED: ICD-10-CM

## 2024-09-05 DIAGNOSIS — E11.40 TYPE 2 DIABETES MELLITUS WITH DIABETIC NEUROPATHY, UNSPECIFIED: ICD-10-CM

## 2024-09-05 DIAGNOSIS — M79.661 PAIN IN RIGHT LOWER LEG: ICD-10-CM

## 2024-09-05 DIAGNOSIS — I87.311 CHRONIC VENOUS HYPERTENSION (IDIOPATHIC) WITH ULCER OF RIGHT LOWER EXTREMITY: ICD-10-CM

## 2024-09-05 DIAGNOSIS — Z79.4 LONG TERM (CURRENT) USE OF INSULIN: ICD-10-CM

## 2024-09-05 DIAGNOSIS — M06.9 RHEUMATOID ARTHRITIS, UNSPECIFIED: ICD-10-CM

## 2024-09-05 DIAGNOSIS — I10 ESSENTIAL (PRIMARY) HYPERTENSION: ICD-10-CM

## 2024-09-06 ENCOUNTER — OUTPATIENT (OUTPATIENT)
Dept: OUTPATIENT SERVICES | Facility: HOSPITAL | Age: 74
LOS: 1 days | End: 2024-09-06
Payer: MEDICARE

## 2024-09-06 DIAGNOSIS — H26.9 UNSPECIFIED CATARACT: Chronic | ICD-10-CM

## 2024-09-06 DIAGNOSIS — Z98.890 OTHER SPECIFIED POSTPROCEDURAL STATES: Chronic | ICD-10-CM

## 2024-09-06 DIAGNOSIS — L97.809 NON-PRESSURE CHRONIC ULCER OF OTHER PART OF UNSPECIFIED LOWER LEG WITH UNSPECIFIED SEVERITY: ICD-10-CM

## 2024-09-06 DIAGNOSIS — Z95.0 PRESENCE OF CARDIAC PACEMAKER: Chronic | ICD-10-CM

## 2024-09-06 DIAGNOSIS — Z86.011 PERSONAL HISTORY OF BENIGN NEOPLASM OF THE BRAIN: Chronic | ICD-10-CM

## 2024-09-06 PROCEDURE — 11042 DBRDMT SUBQ TIS 1ST 20SQCM/<: CPT

## 2024-09-13 ENCOUNTER — OUTPATIENT (OUTPATIENT)
Dept: OUTPATIENT SERVICES | Facility: HOSPITAL | Age: 74
LOS: 1 days | End: 2024-09-13
Payer: MEDICARE

## 2024-09-13 DIAGNOSIS — Z95.0 PRESENCE OF CARDIAC PACEMAKER: Chronic | ICD-10-CM

## 2024-09-13 DIAGNOSIS — Z98.890 OTHER SPECIFIED POSTPROCEDURAL STATES: Chronic | ICD-10-CM

## 2024-09-13 DIAGNOSIS — L97.809 NON-PRESSURE CHRONIC ULCER OF OTHER PART OF UNSPECIFIED LOWER LEG WITH UNSPECIFIED SEVERITY: ICD-10-CM

## 2024-09-13 DIAGNOSIS — Z86.011 PERSONAL HISTORY OF BENIGN NEOPLASM OF THE BRAIN: Chronic | ICD-10-CM

## 2024-09-13 PROCEDURE — 11042 DBRDMT SUBQ TIS 1ST 20SQCM/<: CPT

## 2024-09-18 ENCOUNTER — APPOINTMENT (OUTPATIENT)
Dept: VASCULAR SURGERY | Facility: CLINIC | Age: 74
End: 2024-09-18
Payer: MEDICARE

## 2024-09-18 VITALS
RESPIRATION RATE: 16 BRPM | SYSTOLIC BLOOD PRESSURE: 146 MMHG | BODY MASS INDEX: 29.84 KG/M2 | DIASTOLIC BLOOD PRESSURE: 62 MMHG | TEMPERATURE: 98 F | HEART RATE: 66 BPM | WEIGHT: 148 LBS | OXYGEN SATURATION: 96 % | HEIGHT: 59 IN

## 2024-09-18 DIAGNOSIS — E11.40 TYPE 2 DIABETES MELLITUS WITH DIABETIC NEUROPATHY, UNSPECIFIED: ICD-10-CM

## 2024-09-18 DIAGNOSIS — M79.661 PAIN IN RIGHT LOWER LEG: ICD-10-CM

## 2024-09-18 DIAGNOSIS — I87.311 CHRONIC VENOUS HYPERTENSION (IDIOPATHIC) WITH ULCER OF RIGHT LOWER EXTREMITY: ICD-10-CM

## 2024-09-18 DIAGNOSIS — I73.9 PERIPHERAL VASCULAR DISEASE, UNSPECIFIED: ICD-10-CM

## 2024-09-18 DIAGNOSIS — M06.9 RHEUMATOID ARTHRITIS, UNSPECIFIED: ICD-10-CM

## 2024-09-18 DIAGNOSIS — Z79.4 LONG TERM (CURRENT) USE OF INSULIN: ICD-10-CM

## 2024-09-18 DIAGNOSIS — L97.812 NON-PRESSURE CHRONIC ULCER OF OTHER PART OF RIGHT LOWER LEG WITH FAT LAYER EXPOSED: ICD-10-CM

## 2024-09-18 DIAGNOSIS — I10 ESSENTIAL (PRIMARY) HYPERTENSION: ICD-10-CM

## 2024-09-18 PROCEDURE — 99213 OFFICE O/P EST LOW 20 MIN: CPT

## 2024-09-18 NOTE — PHYSICAL EXAM
[Respiratory Effort] : normal respiratory effort [Normal Rate and Rhythm] : normal rate and rhythm [Abdomen Tenderness] : ~T ~M No abdominal tenderness [No Rash or Lesion] : No rash or lesion [Alert] : alert [Oriented to Person] : oriented to person [Oriented to Place] : oriented to place [Oriented to Time] : oriented to time [Calm] : calm [de-identified] : no acute distress noted, [de-identified] : normocephalic, [FreeTextEntry1] : DP and peroneal signal present, superficial anterior shin ulceration nearly closed.
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
## 2024-09-18 NOTE — ASSESSMENT
[FreeTextEntry1] : 72 YO F with DM with non healing right anterior shin ulcer s/p RLE angiogram with SFA, TPT and peroneal angioplasty and debridement of the wound.  Wound is healing well after just 3 [employer] right lower extremity angiogram with tibioperoneal and peroneal artery angioplasty and debridement of her wound She will continue to follow-up with wound care center For symptoms of deep ulcer which is likely Unna boot the wound is now healing. She can discontinue Plavix from my perspective

## 2024-09-20 ENCOUNTER — OUTPATIENT (OUTPATIENT)
Dept: OUTPATIENT SERVICES | Facility: HOSPITAL | Age: 74
LOS: 1 days | End: 2024-09-20
Payer: MEDICARE

## 2024-09-20 DIAGNOSIS — H26.9 UNSPECIFIED CATARACT: Chronic | ICD-10-CM

## 2024-09-20 DIAGNOSIS — Z95.0 PRESENCE OF CARDIAC PACEMAKER: Chronic | ICD-10-CM

## 2024-09-20 DIAGNOSIS — Z86.011 PERSONAL HISTORY OF BENIGN NEOPLASM OF THE BRAIN: Chronic | ICD-10-CM

## 2024-09-20 DIAGNOSIS — Z98.890 OTHER SPECIFIED POSTPROCEDURAL STATES: Chronic | ICD-10-CM

## 2024-09-20 DIAGNOSIS — L97.809 NON-PRESSURE CHRONIC ULCER OF OTHER PART OF UNSPECIFIED LOWER LEG WITH UNSPECIFIED SEVERITY: ICD-10-CM

## 2024-09-20 PROCEDURE — 11042 DBRDMT SUBQ TIS 1ST 20SQCM/<: CPT

## 2024-09-24 DIAGNOSIS — M06.9 RHEUMATOID ARTHRITIS, UNSPECIFIED: ICD-10-CM

## 2024-09-24 DIAGNOSIS — L97.812 NON-PRESSURE CHRONIC ULCER OF OTHER PART OF RIGHT LOWER LEG WITH FAT LAYER EXPOSED: ICD-10-CM

## 2024-09-24 DIAGNOSIS — E11.40 TYPE 2 DIABETES MELLITUS WITH DIABETIC NEUROPATHY, UNSPECIFIED: ICD-10-CM

## 2024-09-24 DIAGNOSIS — I87.311 CHRONIC VENOUS HYPERTENSION (IDIOPATHIC) WITH ULCER OF RIGHT LOWER EXTREMITY: ICD-10-CM

## 2024-09-24 DIAGNOSIS — I10 ESSENTIAL (PRIMARY) HYPERTENSION: ICD-10-CM

## 2024-09-24 DIAGNOSIS — M79.661 PAIN IN RIGHT LOWER LEG: ICD-10-CM

## 2024-09-24 DIAGNOSIS — Z79.4 LONG TERM (CURRENT) USE OF INSULIN: ICD-10-CM

## 2024-09-27 ENCOUNTER — OUTPATIENT (OUTPATIENT)
Dept: OUTPATIENT SERVICES | Facility: HOSPITAL | Age: 74
LOS: 1 days | End: 2024-09-27
Payer: MEDICARE

## 2024-09-27 DIAGNOSIS — Z86.011 PERSONAL HISTORY OF BENIGN NEOPLASM OF THE BRAIN: Chronic | ICD-10-CM

## 2024-09-27 DIAGNOSIS — L97.809 NON-PRESSURE CHRONIC ULCER OF OTHER PART OF UNSPECIFIED LOWER LEG WITH UNSPECIFIED SEVERITY: ICD-10-CM

## 2024-09-27 DIAGNOSIS — H26.9 UNSPECIFIED CATARACT: Chronic | ICD-10-CM

## 2024-09-27 DIAGNOSIS — Z95.0 PRESENCE OF CARDIAC PACEMAKER: Chronic | ICD-10-CM

## 2024-09-27 DIAGNOSIS — Z98.890 OTHER SPECIFIED POSTPROCEDURAL STATES: Chronic | ICD-10-CM

## 2024-09-27 PROCEDURE — 11042 DBRDMT SUBQ TIS 1ST 20SQCM/<: CPT

## 2024-10-01 DIAGNOSIS — Z79.4 LONG TERM (CURRENT) USE OF INSULIN: ICD-10-CM

## 2024-10-01 DIAGNOSIS — M06.9 RHEUMATOID ARTHRITIS, UNSPECIFIED: ICD-10-CM

## 2024-10-01 DIAGNOSIS — I87.311 CHRONIC VENOUS HYPERTENSION (IDIOPATHIC) WITH ULCER OF RIGHT LOWER EXTREMITY: ICD-10-CM

## 2024-10-01 DIAGNOSIS — I10 ESSENTIAL (PRIMARY) HYPERTENSION: ICD-10-CM

## 2024-10-01 DIAGNOSIS — L97.812 NON-PRESSURE CHRONIC ULCER OF OTHER PART OF RIGHT LOWER LEG WITH FAT LAYER EXPOSED: ICD-10-CM

## 2024-10-01 DIAGNOSIS — E11.40 TYPE 2 DIABETES MELLITUS WITH DIABETIC NEUROPATHY, UNSPECIFIED: ICD-10-CM

## 2024-10-01 DIAGNOSIS — M79.661 PAIN IN RIGHT LOWER LEG: ICD-10-CM

## 2024-10-04 ENCOUNTER — NON-APPOINTMENT (OUTPATIENT)
Age: 74
End: 2024-10-04

## 2024-10-04 ENCOUNTER — APPOINTMENT (OUTPATIENT)
Dept: CARDIOLOGY | Facility: CLINIC | Age: 74
End: 2024-10-04
Payer: MEDICARE

## 2024-10-04 VITALS
HEIGHT: 59 IN | OXYGEN SATURATION: 95 % | SYSTOLIC BLOOD PRESSURE: 156 MMHG | WEIGHT: 148 LBS | HEART RATE: 66 BPM | BODY MASS INDEX: 29.84 KG/M2 | DIASTOLIC BLOOD PRESSURE: 72 MMHG

## 2024-10-04 DIAGNOSIS — E78.00 PURE HYPERCHOLESTEROLEMIA, UNSPECIFIED: ICD-10-CM

## 2024-10-04 DIAGNOSIS — J30.2 OTHER SEASONAL ALLERGIC RHINITIS: ICD-10-CM

## 2024-10-04 DIAGNOSIS — I25.10 ATHEROSCLEROTIC HEART DISEASE OF NATIVE CORONARY ARTERY W/OUT ANGINA PECTORIS: ICD-10-CM

## 2024-10-04 PROCEDURE — 99214 OFFICE O/P EST MOD 30 MIN: CPT | Mod: 25

## 2024-10-04 PROCEDURE — 93000 ELECTROCARDIOGRAM COMPLETE: CPT

## 2024-10-04 RX ORDER — CETIRIZINE HYDROCHLORIDE 10 MG/1
10 TABLET, COATED ORAL DAILY
Refills: 0 | Status: ACTIVE | COMMUNITY

## 2024-10-04 RX ORDER — RANOLAZINE 500 MG/1
500 TABLET, EXTENDED RELEASE ORAL
Qty: 180 | Refills: 3 | Status: ACTIVE | COMMUNITY
Start: 2024-10-04 | End: 1900-01-01

## 2024-10-04 NOTE — HISTORY OF PRESENT ILLNESS
[FreeTextEntry1] : : Rosario Hidalgo MA Patient is a 73 year old Bulgarian speaking woman with HTN, HLD, DM, prior CVAwith Right sided deficits, prior ABI/BSO, RBBB who had with COVID-19 viral pneumonia  in  May/June 2020 and  was found to have Mobitz II block is  s/p successful Medtronic Micra AV+ leadless pacemaker implantation via right femoral vein.  Patient is followed by me for coronary artery disease. Patient was admitted to [facility] in 11/2021 for NSTEMI.she had a left heart cardiac catheterization which showed showed  diffuse multivessel disease RCA, LAD, OM. Patient was explained that she had NSTEMI, Cath showed 2 Vessel CAD which are not amenable for PCI. Patient to be managed medically  Patient's medical therapy was optimized, amlodipine was discontinued in order to facilitate higher dose beta-blocker therapy.  Patient discharged home on Plavix, aspirin, Ranexa 500 twice daily, atorvastatin 80 mg daily, Toprol- mg daily.  Patient is managed medically for her multivessel coronary artery disease. .  Patient had two admissions to the hospital in [facility] for symptoms of shortness of breath, leg edema and chest pain.  Patient was diagnosed with a bilateral pleural effusions treated with diuretics.  She also was diagnosed with pneumonia and UTI which were treated accordingly.  Patient was noted to have a cardiomegaly on the chest x-ray.  Patient's symptoms of dyspnea and leg swelling have improved markedly.  Patient had a device check in June 2024.  Today patient presents for a follow-up visit.  Patient was seen by me in July 2024 I had ordered an echocardiogram again she did not do it because she was in the hospital for a vascular procedure when the test was scheduled and family did not reschedule the test.  Patient is accompanied by her son who is in the room.  Patient brought all of her medications which were verified.  Patient currently is not taking Lasix or Ranalozine , it is unclear why she stopped taking it.  Patient is complaining of substernal chest pain 10 to 12 hours a day, patient denies any associated dyspnea diaphoresis orthopnea PND or leg edema.According to our records she is supposed to be taking ranolazine she has enough medications refills in the pharmacy.    Patient did bring  her medications.- Medication list was reconciled in the prescription.

## 2024-10-04 NOTE — ASSESSMENT
[FreeTextEntry1] : Left heart cardiac catheterization December 2, 2021:DIAGNOSTIC RECOMMENDATIONS: Branch vessel and severe distal LAD and RPDA.disease not suitable for PCI , recommend medical therapy with antianginal if persistent angina despite optimal medical therapy may consider PCI of OM1 of CX. Prepared and signed by Amilcar Vivar MD Signed 12/02/2021 06:31:12  Echocardiogram November 25, 2021: LVEF 55 to 60%.  EKG 12/4/2023-  Paced rhythm Nuclear stress test May 31, 2024: Normal myocardial perfusion.  No evidence of ischemia.  Assessment: 1. CAD-patient has multivessel CAD based on cardiac catheterization in November 2021, NSTEMI in 11/2021 not amenable for PCI, patient is treated medically. Patient is currently Having ongoing symptoms of angina unfortunately she is not taking ranolazine which was prescribed.  2. Mobitz 2 heart block S/P successful Medtronic Micra AV+ leadless pacemaker implantation  3. HTN-controlled  4. DM/HLD/CVA and other problems as noted in the history 5. . CHF- Patient was admitted to hospital in [facility] treated for a CHF.  Patient's chest x-ray showed cardiomegaly and bilateral pleural effusions.  Currently she appears euvolemic.  No symptoms of CHF. 6. Bilateral pleural effusions  Recommendations:  1. Continue current medical therapy - Including ranolazine. 2. Follow-up in 2 months. 3. I will discontinue Lasix for now since she has no evidence of pulmonary congestion. 4. BMP 5. ECHO  All of these recommendations have been discussed with patient's son and patient through the  Miranda Hidalgo MA

## 2024-10-04 NOTE — PHYSICAL EXAM
[Normal] : no edema, no cyanosis, no clubbing, no varicosities [de-identified] : Chaperone: Rosario Hidalgo MA

## 2024-10-07 ENCOUNTER — NON-APPOINTMENT (OUTPATIENT)
Age: 74
End: 2024-10-07

## 2024-10-07 ENCOUNTER — OUTPATIENT (OUTPATIENT)
Dept: OUTPATIENT SERVICES | Facility: HOSPITAL | Age: 74
LOS: 1 days | End: 2024-10-07
Payer: MEDICARE

## 2024-10-07 DIAGNOSIS — H26.9 UNSPECIFIED CATARACT: Chronic | ICD-10-CM

## 2024-10-07 DIAGNOSIS — E87.5 HYPERKALEMIA: ICD-10-CM

## 2024-10-07 DIAGNOSIS — Z98.890 OTHER SPECIFIED POSTPROCEDURAL STATES: Chronic | ICD-10-CM

## 2024-10-07 DIAGNOSIS — L97.809 NON-PRESSURE CHRONIC ULCER OF OTHER PART OF UNSPECIFIED LOWER LEG WITH UNSPECIFIED SEVERITY: ICD-10-CM

## 2024-10-07 DIAGNOSIS — Z95.0 PRESENCE OF CARDIAC PACEMAKER: Chronic | ICD-10-CM

## 2024-10-07 DIAGNOSIS — Z86.011 PERSONAL HISTORY OF BENIGN NEOPLASM OF THE BRAIN: Chronic | ICD-10-CM

## 2024-10-07 LAB
ANION GAP SERPL CALC-SCNC: 20 MMOL/L
BUN SERPL-MCNC: 22 MG/DL
CALCIUM SERPL-MCNC: 10.3 MG/DL
CHLORIDE SERPL-SCNC: 101 MMOL/L
CO2 SERPL-SCNC: 18 MMOL/L
CREAT SERPL-MCNC: 1.09 MG/DL
EGFR: 54 ML/MIN/1.73M2
GLUCOSE SERPL-MCNC: 128 MG/DL
POTASSIUM SERPL-SCNC: 5.9 MMOL/L
SODIUM SERPL-SCNC: 140 MMOL/L

## 2024-10-07 PROCEDURE — 29580 STRAPPING UNNA BOOT: CPT

## 2024-10-07 RX ORDER — SPIRONOLACTONE 25 MG/1
25 TABLET ORAL DAILY
Refills: 0 | Status: DISCONTINUED | COMMUNITY
End: 2024-10-07

## 2024-10-07 NOTE — ED ADULT NURSE REASSESSMENT NOTE - DISTAL EXTREMITY COLOR
Patient Name: Jennifer Gomes  : 1940    MRN: [id number]                              Today's Date: 10/7/2024       Admit Date: 10/5/2024    Visit Dx:     ICD-10-CM ICD-9-CM   1. Sepsis without acute organ dysfunction, due to unspecified organism  A41.9 038.9     995.91   2. Acute cystitis without hematuria  N30.00 595.0   3. Gastroenteritis  K52.9 558.9   4. Acute metabolic encephalopathy  G93.41 348.31   5. Acute respiratory failure with hypoxia  J96.01 518.81   6. Dysphagia, unspecified type [R13.10]  R13.10 787.20   7. Impaired mobility [Z74.09]  Z74.09 799.89     Patient Active Problem List   Diagnosis    Gastroesophageal reflux disease    Spinal stenosis, lumbar region, without neurogenic claudication    Overweight    Non-smoker    Erosion of vaginal mesh    Adenocarcinoma of endometrium    Encounter for consultation    S/P hysterectomy with oophorectomy    Encounter for follow-up surveillance of endometrial cancer    History of radiation therapy    Obesity (BMI 30-39.9)    Non-traumatic rhabdomyolysis    Metabolic encephalopathy    Acute renal failure superimposed on stage 3 chronic kidney disease    Acute respiratory failure with hypoxia and hypercapnia    Medical non-compliance, does not take narcotics as prescribed    SIRS (systemic inflammatory response syndrome)    Chronic constipation    Chronic pain syndrome    Chronic prescription opiate use    Anemia    Hypothyroidism (acquired)    Essential hypertension    TOMÁS (acute kidney injury)    Venous insufficiency of both lower extremities    Fluid retention    Low blood pressure reading    Obesity, Class III, BMI 40-49.9 (morbid obesity)    Chronic intractable headache    RAFAL (obstructive sleep apnea)    Change in bowel habits    Acute encephalopathy due to UTI    Toxic metabolic encephalopathy    Polypharmacy    Frequent falls    Grade III internal hemorrhoids    External hemorrhoids    E. coli UTI    Colitis    Moderate malnutrition    
color consistent with ethnicity/race
Transaminitis    Acute cholecystitis    Acute UTI (urinary tract infection)    Polypharmacy    Altered mental status    UTI (urinary tract infection)    Bacteremia due to Enterococcus    Dementia    Hypokalemia    Sepsis    Sepsis due to urinary tract infection     Past Medical History:   Diagnosis Date    Anxiety     Arthritis     Bronchitis     Cancer     uterine    Chronic pain     Depression     Disease of thyroid gland     Fibromyalgia     GERD (gastroesophageal reflux disease)     Headache     History of transfusion     AS     Hyperlipidemia     Hypertension     Incontinence     Insomnia     Leg pain     Lumbar stenosis     Migraines     Peptic ulcer     Restless legs     Sleep apnea     NO C-PAP    UTI (urinary tract infection)     Vaginal bleeding      Past Surgical History:   Procedure Laterality Date    BLADDER REPAIR      MESH HAD TO BE REMOVED IN 2013    BREAST BIOPSY Right 2017    benign    BREAST CYST EXCISION Left     CARDIAC CATHETERIZATION      CARPAL TUNNEL RELEASE      CATARACT EXTRACTION W/ INTRAOCULAR LENS  IMPLANT, BILATERAL      CHOLECYSTECTOMY WITH INTRAOPERATIVE CHOLANGIOGRAM N/A 2023    Procedure: CHOLECYSTECTOMY LAPAROSCOPIC INTRAOPERATIVE CHOLANGIOGRAM;  Surgeon: Gerardo Arciniega MD;  Location:  [facility] OR;  Service: General;  Laterality: N/A;    COLONOSCOPY      COLONOSCOPY N/A 10/01/2021    Procedure: COLONOSCOPY WITH ANESTHESIA;  Surgeon: Tom Velasco DO;  Location:  [facility] ENDOSCOPY;  Service: Gastroenterology;  Laterality: N/A;  pre: change in bowel habits  post: diverticulosis. hemorrhoids.   Olivia Mora APRN        CYSTECTOMY      D & C HYSTEROSCOPY N/A 2017    Procedure: DILATATION AND CURETTAGE HYSTEROSCOPY;  Surgeon: Shasta Madrigal MD;  Location: [facility] OR;  Service:     DILATION AND CURETTAGE, DIAGNOSTIC / THERAPEUTIC      ENDOSCOPY  2010    Short segment of Arriola's,Moderate chroninc esophagogastritis and negative H.pylori    
ENDOSCOPY N/A 09/25/2017    Procedure: ESOPHAGOGASTRODUODENOSCOPY WITH ANESTHESIA;  Surgeon: Tom Velasco DO;  Location:  [facility];  Service:     EYE SURGERY      RETINA    HEMORRHOIDECTOMY SIGMOIDOSCOPY N/A 3/21/2023    Procedure: HEMORRHOIDECTOMY WITH EXAM UNDER ANESTHESIA;  Surgeon: Holly Chavez MD;  Location:  [facility] OR;  Service: General;  Laterality: N/A;    HYSTERECTOMY  12/20/2017    ORIF TIBIA/FIBULA FRACTURES Left 2000    TRANSVAGINAL TAPING SUSPENSION N/A 11/06/2017    Procedure: VAGINAL MESH REVISION;  Surgeon: Shasta Madrigal MD;  Location:  [facility] OR;  Service:     VAGINAL MESH REVISION  2013      General Information       Row Name 10/07/24 1005          Physical Therapy Time and Intention    Document Type evaluation  cc: AMS with weakness. Dx: Acute renal failure, UTI, sepsis. Code blue, code chill, intubated 10/5. Extubated 10/6  -AJ     Mode of Treatment physical therapy  -       Row Name 10/07/24 1005          General Information    Patient Profile Reviewed yes  -AJ     Prior Level of Function independent:;all household mobility;ADL's;max assist:;dependent:;home management;community mobility  Pt demo intermittent confusion, told RN she was bed bound but tells PT/OT independent at moves around at home  -AJ     Existing Precautions/Restrictions fall;other (see comments)  BRITTANY mcclain tube  -AJ     Barriers to Rehab medically complex;previous functional deficit  -AJ       Row Name 10/07/24 1005          Living Environment    People in Home spouse;child(sebastian), adult  -AJ       Row Name 10/07/24 1005          Home Main Entrance    Number of Stairs, Main Entrance four  -AJ     Stair Railings, Main Entrance none  -AJ       Row Name 10/07/24 1005          Stairs Within Home, Primary    Number of Stairs, Within Home, Primary none  -AJ       Row Name 10/07/24 1005          Cognition    Orientation Status (Cognition) oriented to;person;time  -AJ       Row Name 10/07/24 1005          Safety 
Issues, Functional Mobility    Safety Issues Affecting Function (Mobility) ability to follow commands;awareness of need for assistance;insight into deficits/self-awareness;safety precaution awareness;safety precautions follow-through/compliance;impulsivity;friction/shear risk;sequencing abilities  -     Impairments Affecting Function (Mobility) balance;cognition;endurance/activity tolerance;pain;strength  -     Cognitive Impairments, Mobility Safety/Performance attention;awareness, need for assistance;insight into deficits/self-awareness;judgment;problem-solving/reasoning;safety precaution awareness;safety precaution follow-through  -               User Key  (r) = Recorded By, (t) = Taken By, (c) = Cosigned By      Initials Name Provider Type    Sunny Tsang PT DPT Physical Therapist                   Mobility       Row Name 10/07/24 1005          Bed Mobility    Bed Mobility supine-sit;sit-supine  -     Supine-Sit [place] (Bed Mobility) maximum assist (25% patient effort);2 person assist;verbal cues;nonverbal cues (demo/gesture)  -     Sit-Supine [place] (Bed Mobility) maximum assist (25% patient effort);2 person assist;verbal cues;nonverbal cues (demo/gesture)  -     Assistive Device (Bed Mobility) bed rails;draw sheet;head of bed elevated  -       Row Name 10/07/24 1005          Bed-Chair Transfer    Bed-Chair [place] (Transfers) unable to assess  -       Row Name 10/07/24 1005          Sit-Stand Transfer    Sit-Stand [place] (Transfers) maximum assist (25% patient effort);2 person assist;verbal cues;nonverbal cues (demo/gesture)  -     Assistive Device (Sit-Stand Transfers) other (see comments)  -KAVITA     Comment, (Sit-Stand Transfer) HHA x2  -       Row Name 10/07/24 1005          Gait/Stairs (Locomotion)    [place] Level (Gait) unable to assess  -               User Key  (r) = Recorded By, (t) = Taken By, (c) = Cosigned By      Initials Name Provider Type    KAVITA 
"Sunny Esquivel, PT DPT Physical Therapist                   Obj/Interventions       Row Name 10/07/24 1005          Range of Motion Comprehensive    General Range of Motion lower extremity range of motion deficits identified  -     Comment, General Range of Motion pt performed b knee ext to 50% in B LE but refused attempt at hip flexion \"who sits and marches at home\"  -       Row Name 10/07/24 1005          Strength Comprehensive (MMT)    General Manual Muscle Testing (MMT) Assessment lower extremity strength deficits identified  -     Comment, General Manual Muscle Testing (MMT) Assessment No formal MMT performed  -       Row Name 10/07/24 1005          Balance    Balance Assessment sitting dynamic balance;sitting static balance;standing static balance  -     Static Sitting Balance contact guard  -     Dynamic Sitting Balance contact guard;minimal assist  -     Position, Sitting Balance supported;sitting edge of bed  -     Static Standing Balance maximum assist;2-person assist  -AJ     Position/Device Used, Standing Balance supported;other (see comments)  HHA  -     Balance Interventions sitting;standing;sit to stand;supported;static;dynamic  -               User Key  (r) = Recorded By, (t) = Taken By, (c) = Cosigned By      Initials Name Provider Type    AJ Sunny Esquivel, PT DPT Physical Therapist                   Goals/Plan       Row Name 10/07/24 1005          Bed Mobility Goal 1 (PT)    Activity/Assistive Device (Bed Mobility Goal 1, PT) bed mobility activities, all;rolling to left;rolling to right;supine to sit;sit to supine  -     [place] Level/Cues Needed (Bed Mobility Goal 1, PT) minimum assist (75% or more patient effort)  -     Time Frame (Bed Mobility Goal 1, PT) long term goal (LTG);10 days  -     Progress/Outcomes (Bed Mobility Goal 1, PT) new goal  -       Row Name 10/07/24 1005          Transfer Goal 1 (PT)    Activity/Assistive Device (Transfer Goal 1, PT) "
sit-to-stand/stand-to-sit;bed-to-chair/chair-to-bed;toilet;wheelchair transfer  -AJ     [place] Level/Cues Needed (Transfer Goal 1, PT) minimum assist (75% or more patient effort)  -AJ     Time Frame (Transfer Goal 1, PT) long term goal (LTG);10 days  -AJ     Progress/Outcome (Transfer Goal 1, PT) new goal  -       Row Name 10/07/24 1005          Gait Training Goal 1 (PT)    Activity/Assistive Device (Gait Training Goal 1, PT) gait (walking locomotion);decrease asymmetrical patterns;decrease fall risk;diminish gait deviation;forward stepping;improve balance and speed;increase endurance/gait distance;increase energy conservation;assistive device use;walker, rolling  -AJ     [place] Level (Gait Training Goal 1, PT) minimum assist (75% or more patient effort)  -AJ     Distance (Gait Training Goal 1, PT) 25'  -AJ     Time Frame (Gait Training Goal 1, PT) long term goal (LTG);10 days  -AJ     Progress/Outcome (Gait Training Goal 1, PT) new goal  -       Row Name 10/07/24 1005          Problem Specific Goal 1 (PT)    Problem Specific Goal 1 (PT) Pt will participate in session with min verbal cueing and report pain 4/10 or less  -AJ     Time Frame (Problem Specific Goal 1, PT) long-term goal (LTG);by discharge  -AJ     Progress/Outcome (Problem Specific Goal 1, PT) new goal  -       Row Name 10/07/24 1005          Therapy Assessment/Plan (PT)    Planned Therapy Interventions (PT) balance training;bed mobility training;gait training;home exercise program;postural re-education;transfer training;patient/family education;strengthening;neuromuscular re-education;ROM (range of motion);stretching  -AJ               User Key  (r) = Recorded By, (t) = Taken By, (c) = Cosigned By      Initials Name Provider Type    Sunny Tsang, HUNTER DPT Physical Therapist                   Clinical Impression       Row Name 10/07/24 1005          Pain    Pretreatment Pain Rating 8/10  -AJ     Posttreatment Pain Rating 8/10  -AJ  
   Pain Location - Side/Orientation Right  -     Pain Location generalized  -     Pain Location - knee;back;buttock  -     Pain Intervention(s) Nursing Notified;Repositioned;Ambulation/increased activity  -       Row Name 10/07/24 1005          Plan of Care Review    Plan of Care Reviewed With patient  -     Outcome Evaluation PT eval complete. Pt in fowlers position, AO to person and time with intermittent confusion with situation awareness. pt reports pain in back, knee and chest and was independent prior to admission. however, the pt reported she was bed bound per RN. Today pt was required Max Ax2 for bed mobility and sitting<>stand today and required frequent verbal cues and tactile cues to maintain sitting balance. Pt reported increased R knee pain with standing which also limited ability and prevented side steps and further ambulation. Pt returned to supine and left in fowlers position, and bed alarm reactivated. pt will benefit from skilled PT services to improve bed mobility, t/f, and decrease fall risk. Recommend SNF when medically stable.  -       Row Name 10/07/24 1005          Therapy Assessment/Plan (PT)    Patient/Family Therapy Goals Statement (PT) none stated  -     Rehab Potential (PT) fair, will monitor progress closely  -     Criteria for Skilled Interventions Met (PT) yes;meets criteria;skilled treatment is necessary  -     Therapy Frequency (PT) 2 times/day  -     Predicted Duration of Therapy Intervention (PT) until d/c  -       Row Name 10/07/24 1005          Vital Signs    Pre Patient Position Supine  -AJ     Intra Patient Position Standing  -AJ     Post Patient Position Supine  -       Row Name 10/07/24 1005          Positioning and Restraints    Pre-Treatment Position in bed  -     Post Treatment Position bed  -AJ     In Bed notified nsg;fowlers;call light within reach;encouraged to call for assist;exit alarm on;side rails up x3;patient within staff view  -       
        User Key  (r) = Recorded By, (t) = Taken By, (c) = Cosigned By      Initials Name Provider Type    Sunny Tsang PT DPT Physical Therapist                   Outcome Measures       Row Name 10/07/24 1005          How much help from another person do you currently need...    Turning from your back to your side while in flat bed without using bedrails? 2  -AJ     Moving from lying on back to sitting on the side of a flat bed without bedrails? 2  -AJ     Moving to and from a bed to a chair (including a wheelchair)? 1  -AJ     Standing up from a chair using your arms (e.g., wheelchair, bedside chair)? 2  -AJ     Climbing 3-5 steps with a railing? 1  -AJ     To walk in hospital room? 1  -AJ     AM-PAC 6 Clicks Score (PT) 9  -AJ     Highest Level of Mobility Goal 3 --> Sit at edge of bed  -AJ       Row Name 10/07/24 1010 10/07/24 1005       Functional Assessment    Outcome Measure Options AM-PAC 6 Clicks Daily Activity (OT)  - AM-PAC 6 Clicks Basic Mobility (PT)  -              User Key  (r) = Recorded By, (t) = Taken By, (c) = Cosigned By      Initials Name Provider Type    Faith Posadas, OTR/L Occupational Therapist    Sunny Tsang PT DPT Physical Therapist                                 Physical Therapy Education       Title: PT OT SLP Therapies (In Progress)       Topic: Physical Therapy (In Progress)       Point: Mobility training (In Progress)       Learning Progress Summary             Patient Acceptance, E, NR by KAVITA at 10/7/2024 1158    Comment: role of PT, call for assist, d/c planning, high fall risk                         Point: Home exercise program (In Progress)       Learning Progress Summary             Patient Acceptance, E, NR by KAVITA at 10/7/2024 1158    Comment: role of PT, call for assist, d/c planning, high fall risk                         Point: Body mechanics (In Progress)       Learning Progress Summary             Patient Acceptance, E, NR by KAVITA at 10/7/2024 1158    
Comment: role of PT, call for assist, d/c planning, high fall risk                         Point: Precautions (In Progress)       Learning Progress Summary             Patient Acceptance, E, NR by  at 10/7/2024 1158    Comment: role of PT, call for assist, d/c planning, high fall risk                                         User Key       Initials Effective Dates Name Provider Type Discipline     08/15/24 -  Sunny Esquivel, PT DPT Physical Therapist PT                  PT Recommendation and Plan  Planned Therapy Interventions (PT): balance training, bed mobility training, gait training, home exercise program, postural re-education, transfer training, patient/family education, strengthening, neuromuscular re-education, ROM (range of motion), stretching  Plan of Care Reviewed With: patient  Outcome Evaluation: PT eval complete. Pt in fowlers position, AO to person and time with intermittent confusion with situation awareness. pt reports pain in back, knee and chest and was independent prior to admission. however, the pt reported she was bed bound per RN. Today pt was required Max Ax2 for bed mobility and sitting<>stand today and required frequent verbal cues and tactile cues to maintain sitting balance. Pt reported increased R knee pain with standing which also limited ability and prevented side steps and further ambulation. Pt returned to supine and left in fowlers position, and bed alarm reactivated. pt will benefit from skilled PT services to improve bed mobility, t/f, and decrease fall risk. Recommend SNF when medically stable.     Time Calculation:         PT Charges       Row Name 10/07/24 1005             Time Calculation    Start Time 1005  -      Stop Time 1049  +10 for chart review, and staff communication  -      Time Calculation (min) 44 min  -AJ      PT Received On 10/07/24  -      PT Goal Re-Cert Due Date 10/17/24  -         Untimed Charges    PT Eval/Re-eval Minutes 54  -AJ         Total 
Minutes    Untimed Charges Total Minutes 54  -AJ       Total Minutes 54  -AJ                User Key  (r) = Recorded By, (t) = Taken By, (c) = Cosigned By      Initials Name Provider Type    Snuny Tsang, PT DPT Physical Therapist                  Therapy Charges for Today       Code Description Service Date Service Provider Modifiers Qty    12165224724 HC PT EVAL MOD COMPLEXITY 4 10/7/2024 Sunny Esquivel PT DPT GP 1            PT G-Codes  Outcome Measure Options: AM-PAC 6 Clicks Daily Activity (OT)  AM-PAC 6 Clicks Score (PT): 9  AM-PAC 6 Clicks Score (OT): 11  PT Discharge Summary  Anticipated Discharge Disposition (PT): skilled nursing facility    Sunny Esquivel PT DPT  10/7/2024    
color consistent with ethnicity/race
color consistent with ethnicity/race

## 2024-10-08 ENCOUNTER — RESULT REVIEW (OUTPATIENT)
Age: 74
End: 2024-10-08

## 2024-10-08 ENCOUNTER — INPATIENT (INPATIENT)
Facility: HOSPITAL | Age: 74
LOS: 5 days | Discharge: HOME | DRG: 291 | End: 2024-10-14
Attending: STUDENT IN AN ORGANIZED HEALTH CARE EDUCATION/TRAINING PROGRAM | Admitting: STUDENT IN AN ORGANIZED HEALTH CARE EDUCATION/TRAINING PROGRAM
Payer: MEDICARE

## 2024-10-08 VITALS
RESPIRATION RATE: 20 BRPM | SYSTOLIC BLOOD PRESSURE: 138 MMHG | TEMPERATURE: 98 F | HEIGHT: 62 IN | WEIGHT: 147.93 LBS | DIASTOLIC BLOOD PRESSURE: 78 MMHG | OXYGEN SATURATION: 88 % | HEART RATE: 66 BPM

## 2024-10-08 DIAGNOSIS — L97.812 NON-PRESSURE CHRONIC ULCER OF OTHER PART OF RIGHT LOWER LEG WITH FAT LAYER EXPOSED: ICD-10-CM

## 2024-10-08 DIAGNOSIS — I10 ESSENTIAL (PRIMARY) HYPERTENSION: ICD-10-CM

## 2024-10-08 DIAGNOSIS — E11.40 TYPE 2 DIABETES MELLITUS WITH DIABETIC NEUROPATHY, UNSPECIFIED: ICD-10-CM

## 2024-10-08 DIAGNOSIS — M79.661 PAIN IN RIGHT LOWER LEG: ICD-10-CM

## 2024-10-08 DIAGNOSIS — Z79.4 LONG TERM (CURRENT) USE OF INSULIN: ICD-10-CM

## 2024-10-08 DIAGNOSIS — Z98.890 OTHER SPECIFIED POSTPROCEDURAL STATES: Chronic | ICD-10-CM

## 2024-10-08 DIAGNOSIS — H26.9 UNSPECIFIED CATARACT: Chronic | ICD-10-CM

## 2024-10-08 DIAGNOSIS — Z95.0 PRESENCE OF CARDIAC PACEMAKER: Chronic | ICD-10-CM

## 2024-10-08 DIAGNOSIS — Z86.011 PERSONAL HISTORY OF BENIGN NEOPLASM OF THE BRAIN: Chronic | ICD-10-CM

## 2024-10-08 DIAGNOSIS — M06.9 RHEUMATOID ARTHRITIS, UNSPECIFIED: ICD-10-CM

## 2024-10-08 DIAGNOSIS — I50.9 HEART FAILURE, UNSPECIFIED: ICD-10-CM

## 2024-10-08 LAB
ALBUMIN SERPL ELPH-MCNC: 3.7 G/DL — SIGNIFICANT CHANGE UP (ref 3.3–5.2)
ALP SERPL-CCNC: 119 U/L — SIGNIFICANT CHANGE UP (ref 40–120)
ALT FLD-CCNC: 11 U/L — SIGNIFICANT CHANGE UP
ANION GAP SERPL CALC-SCNC: 14 MMOL/L — SIGNIFICANT CHANGE UP (ref 5–17)
ANION GAP SERPL CALC-SCNC: 15 MMOL/L — SIGNIFICANT CHANGE UP (ref 5–17)
APPEARANCE UR: ABNORMAL
AST SERPL-CCNC: 18 U/L — SIGNIFICANT CHANGE UP
BACTERIA # UR AUTO: ABNORMAL /HPF
BASOPHILS # BLD AUTO: 0.05 K/UL — SIGNIFICANT CHANGE UP (ref 0–0.2)
BASOPHILS NFR BLD AUTO: 0.3 % — SIGNIFICANT CHANGE UP (ref 0–2)
BILIRUB SERPL-MCNC: 0.4 MG/DL — SIGNIFICANT CHANGE UP (ref 0.4–2)
BILIRUB UR-MCNC: NEGATIVE — SIGNIFICANT CHANGE UP
BUN SERPL-MCNC: 38.1 MG/DL — HIGH (ref 8–20)
BUN SERPL-MCNC: 39.3 MG/DL — HIGH (ref 8–20)
CALCIUM SERPL-MCNC: 8.7 MG/DL — SIGNIFICANT CHANGE UP (ref 8.4–10.5)
CALCIUM SERPL-MCNC: 8.9 MG/DL — SIGNIFICANT CHANGE UP (ref 8.4–10.5)
CAST: 2 /LPF — SIGNIFICANT CHANGE UP (ref 0–4)
CHLORIDE SERPL-SCNC: 92 MMOL/L — LOW (ref 96–108)
CHLORIDE SERPL-SCNC: 92 MMOL/L — LOW (ref 96–108)
CO2 SERPL-SCNC: 17 MMOL/L — LOW (ref 22–29)
CO2 SERPL-SCNC: 17 MMOL/L — LOW (ref 22–29)
COLOR SPEC: YELLOW — SIGNIFICANT CHANGE UP
CREAT SERPL-MCNC: 1.41 MG/DL — HIGH (ref 0.5–1.3)
CREAT SERPL-MCNC: 1.49 MG/DL — HIGH (ref 0.5–1.3)
DIFF PNL FLD: NEGATIVE — SIGNIFICANT CHANGE UP
EGFR: 37 ML/MIN/1.73M2 — LOW
EGFR: 40 ML/MIN/1.73M2 — LOW
EOSINOPHIL # BLD AUTO: 0.11 K/UL — SIGNIFICANT CHANGE UP (ref 0–0.5)
EOSINOPHIL NFR BLD AUTO: 0.6 % — SIGNIFICANT CHANGE UP (ref 0–6)
FLUAV AG NPH QL: SIGNIFICANT CHANGE UP
FLUBV AG NPH QL: SIGNIFICANT CHANGE UP
GLUCOSE BLDC GLUCOMTR-MCNC: 211 MG/DL — HIGH (ref 70–99)
GLUCOSE BLDC GLUCOMTR-MCNC: 227 MG/DL — HIGH (ref 70–99)
GLUCOSE SERPL-MCNC: 176 MG/DL — HIGH (ref 70–99)
GLUCOSE SERPL-MCNC: 243 MG/DL — HIGH (ref 70–99)
GLUCOSE UR QL: NEGATIVE MG/DL — SIGNIFICANT CHANGE UP
HCT VFR BLD CALC: 25.7 % — LOW (ref 34.5–45)
HGB BLD-MCNC: 8.4 G/DL — LOW (ref 11.5–15.5)
IMM GRANULOCYTES NFR BLD AUTO: 1.5 % — HIGH (ref 0–0.9)
KETONES UR-MCNC: NEGATIVE MG/DL — SIGNIFICANT CHANGE UP
LACTATE BLDV-MCNC: 2.5 MMOL/L — HIGH (ref 0.5–2)
LEUKOCYTE ESTERASE UR-ACNC: ABNORMAL
LYMPHOCYTES # BLD AUTO: 11.7 % — LOW (ref 13–44)
LYMPHOCYTES # BLD AUTO: 2.09 K/UL — SIGNIFICANT CHANGE UP (ref 1–3.3)
MCHC RBC-ENTMCNC: 26 PG — LOW (ref 27–34)
MCHC RBC-ENTMCNC: 32.7 GM/DL — SIGNIFICANT CHANGE UP (ref 32–36)
MCV RBC AUTO: 79.6 FL — LOW (ref 80–100)
MONOCYTES # BLD AUTO: 1.23 K/UL — HIGH (ref 0–0.9)
MONOCYTES NFR BLD AUTO: 6.9 % — SIGNIFICANT CHANGE UP (ref 2–14)
NEUTROPHILS # BLD AUTO: 14.18 K/UL — HIGH (ref 1.8–7.4)
NEUTROPHILS NFR BLD AUTO: 79 % — HIGH (ref 43–77)
NITRITE UR-MCNC: NEGATIVE — SIGNIFICANT CHANGE UP
NT-PROBNP SERPL-SCNC: HIGH PG/ML (ref 0–300)
PH UR: 6 — SIGNIFICANT CHANGE UP (ref 5–8)
PLATELET # BLD AUTO: 630 K/UL — HIGH (ref 150–400)
POTASSIUM SERPL-MCNC: 5.7 MMOL/L — HIGH (ref 3.5–5.3)
POTASSIUM SERPL-MCNC: 6 MMOL/L — HIGH (ref 3.5–5.3)
POTASSIUM SERPL-SCNC: 5.7 MMOL/L — HIGH (ref 3.5–5.3)
POTASSIUM SERPL-SCNC: 6 MMOL/L — HIGH (ref 3.5–5.3)
PROT SERPL-MCNC: 7.8 G/DL — SIGNIFICANT CHANGE UP (ref 6.6–8.7)
PROT UR-MCNC: SIGNIFICANT CHANGE UP MG/DL
RBC # BLD: 3.23 M/UL — LOW (ref 3.8–5.2)
RBC # FLD: 16.3 % — HIGH (ref 10.3–14.5)
RBC CASTS # UR COMP ASSIST: 2 /HPF — SIGNIFICANT CHANGE UP (ref 0–4)
RSV RNA NPH QL NAA+NON-PROBE: SIGNIFICANT CHANGE UP
SARS-COV-2 RNA SPEC QL NAA+PROBE: SIGNIFICANT CHANGE UP
SODIUM SERPL-SCNC: 123 MMOL/L — LOW (ref 135–145)
SODIUM SERPL-SCNC: 124 MMOL/L — LOW (ref 135–145)
SP GR SPEC: 1.01 — SIGNIFICANT CHANGE UP (ref 1–1.03)
SQUAMOUS # UR AUTO: 1 /HPF — SIGNIFICANT CHANGE UP (ref 0–5)
TROPONIN T, HIGH SENSITIVITY RESULT: 25 NG/L — SIGNIFICANT CHANGE UP (ref 0–51)
TROPONIN T, HIGH SENSITIVITY RESULT: 30 NG/L — SIGNIFICANT CHANGE UP (ref 0–51)
UROBILINOGEN FLD QL: 0.2 MG/DL — SIGNIFICANT CHANGE UP (ref 0.2–1)
WBC # BLD: 17.93 K/UL — HIGH (ref 3.8–10.5)
WBC # FLD AUTO: 17.93 K/UL — HIGH (ref 3.8–10.5)
WBC UR QL: 271 /HPF — HIGH (ref 0–5)

## 2024-10-08 PROCEDURE — 74176 CT ABD & PELVIS W/O CONTRAST: CPT | Mod: 26

## 2024-10-08 PROCEDURE — 93306 TTE W/DOPPLER COMPLETE: CPT | Mod: 26

## 2024-10-08 PROCEDURE — 99285 EMERGENCY DEPT VISIT HI MDM: CPT

## 2024-10-08 PROCEDURE — 71045 X-RAY EXAM CHEST 1 VIEW: CPT | Mod: 26

## 2024-10-08 PROCEDURE — 99223 1ST HOSP IP/OBS HIGH 75: CPT

## 2024-10-08 PROCEDURE — 93010 ELECTROCARDIOGRAM REPORT: CPT

## 2024-10-08 RX ORDER — PANTOPRAZOLE SODIUM 40 MG/1
20 TABLET, DELAYED RELEASE ORAL
Refills: 0 | Status: DISCONTINUED | OUTPATIENT
Start: 2024-10-08 | End: 2024-10-14

## 2024-10-08 RX ORDER — METOPROLOL TARTRATE 50 MG
100 TABLET ORAL DAILY
Refills: 0 | Status: DISCONTINUED | OUTPATIENT
Start: 2024-10-08 | End: 2024-10-14

## 2024-10-08 RX ORDER — FUROSEMIDE 10 MG/ML
60 INJECTION INTRAVENOUS ONCE
Refills: 0 | Status: COMPLETED | OUTPATIENT
Start: 2024-10-08 | End: 2024-10-08

## 2024-10-08 RX ORDER — FERROUS SULFATE 325(65) MG
325 TABLET ORAL THREE TIMES A DAY
Refills: 0 | Status: DISCONTINUED | OUTPATIENT
Start: 2024-10-08 | End: 2024-10-14

## 2024-10-08 RX ORDER — INSULIN REGULAR, HUMAN 100/ML
5 VIAL (ML) INJECTION ONCE
Refills: 0 | Status: DISCONTINUED | OUTPATIENT
Start: 2024-10-08 | End: 2024-10-08

## 2024-10-08 RX ORDER — ALCOHOL ANTISEPTIC PADS
50 PADS, MEDICATED (EA) TOPICAL ONCE
Refills: 0 | Status: COMPLETED | OUTPATIENT
Start: 2024-10-08 | End: 2024-10-08

## 2024-10-08 RX ORDER — ALCOHOL ANTISEPTIC PADS
15 PADS, MEDICATED (EA) TOPICAL ONCE
Refills: 0 | Status: DISCONTINUED | OUTPATIENT
Start: 2024-10-08 | End: 2024-10-14

## 2024-10-08 RX ORDER — VALSARTAN 320 MG/1
160 TABLET ORAL DAILY
Refills: 0 | Status: DISCONTINUED | OUTPATIENT
Start: 2024-10-08 | End: 2024-10-09

## 2024-10-08 RX ORDER — 5-HYDROXYTRYPTOPHAN (5-HTP) 100 MG
3 TABLET,DISINTEGRATING ORAL AT BEDTIME
Refills: 0 | Status: DISCONTINUED | OUTPATIENT
Start: 2024-10-08 | End: 2024-10-14

## 2024-10-08 RX ORDER — SODIUM CHLORIDE IRRIG SOLUTION 0.9 %
1000 SOLUTION, IRRIGATION IRRIGATION
Refills: 0 | Status: DISCONTINUED | OUTPATIENT
Start: 2024-10-08 | End: 2024-10-14

## 2024-10-08 RX ORDER — GABAPENTIN 800 MG/1
800 TABLET, FILM COATED ORAL ONCE
Refills: 0 | Status: COMPLETED | OUTPATIENT
Start: 2024-10-08 | End: 2024-10-10

## 2024-10-08 RX ORDER — INSULIN REGULAR, HUMAN 100/ML
5 VIAL (ML) INJECTION ONCE
Refills: 0 | Status: COMPLETED | OUTPATIENT
Start: 2024-10-08 | End: 2024-10-08

## 2024-10-08 RX ORDER — RANOLAZINE 500 MG/1
500 TABLET, EXTENDED RELEASE ORAL
Refills: 0 | Status: DISCONTINUED | OUTPATIENT
Start: 2024-10-08 | End: 2024-10-14

## 2024-10-08 RX ORDER — CEFTRIAXONE SODIUM 1 G
1000 VIAL (EA) INJECTION ONCE
Refills: 0 | Status: COMPLETED | OUTPATIENT
Start: 2024-10-08 | End: 2024-10-08

## 2024-10-08 RX ORDER — AMLODIPINE BESYLATE 5 MG
10 TABLET ORAL ONCE
Refills: 0 | Status: COMPLETED | OUTPATIENT
Start: 2024-10-08 | End: 2024-10-09

## 2024-10-08 RX ORDER — ATORVASTATIN CALCIUM 10 MG/1
80 TABLET, FILM COATED ORAL AT BEDTIME
Refills: 0 | Status: DISCONTINUED | OUTPATIENT
Start: 2024-10-08 | End: 2024-10-14

## 2024-10-08 RX ORDER — CEFTRIAXONE SODIUM 1 G
1000 VIAL (EA) INJECTION ONCE
Refills: 0 | Status: DISCONTINUED | OUTPATIENT
Start: 2024-10-08 | End: 2024-10-08

## 2024-10-08 RX ORDER — INSULIN LISPRO 100/ML
VIAL (ML) SUBCUTANEOUS
Refills: 0 | Status: DISCONTINUED | OUTPATIENT
Start: 2024-10-08 | End: 2024-10-14

## 2024-10-08 RX ORDER — ISOSORBIDE MONONITRATE 30 MG
30 TABLET, EXTENDED RELEASE 24 HR ORAL DAILY
Refills: 0 | Status: DISCONTINUED | OUTPATIENT
Start: 2024-10-08 | End: 2024-10-14

## 2024-10-08 RX ORDER — GLUCAGON INJECTION, SOLUTION 0.5 MG/.1ML
1 INJECTION, SOLUTION SUBCUTANEOUS ONCE
Refills: 0 | Status: DISCONTINUED | OUTPATIENT
Start: 2024-10-08 | End: 2024-10-14

## 2024-10-08 RX ORDER — FUROSEMIDE 10 MG/ML
40 INJECTION INTRAVENOUS
Refills: 0 | Status: ACTIVE | OUTPATIENT
Start: 2024-10-08 | End: 2025-09-06

## 2024-10-08 RX ORDER — ASPIRIN 325 MG
81 TABLET ORAL DAILY
Refills: 0 | Status: DISCONTINUED | OUTPATIENT
Start: 2024-10-08 | End: 2024-10-14

## 2024-10-08 RX ADMIN — Medication 5000 UNIT(S): at 17:23

## 2024-10-08 RX ADMIN — FUROSEMIDE 60 MILLIGRAM(S): 10 INJECTION INTRAVENOUS at 13:16

## 2024-10-08 RX ADMIN — ATORVASTATIN CALCIUM 80 MILLIGRAM(S): 10 TABLET, FILM COATED ORAL at 21:19

## 2024-10-08 RX ADMIN — Medication 5 UNIT(S): at 18:07

## 2024-10-08 RX ADMIN — Medication 4: at 22:11

## 2024-10-08 RX ADMIN — Medication 325 MILLIGRAM(S): at 21:19

## 2024-10-08 RX ADMIN — FUROSEMIDE 40 MILLIGRAM(S): 10 INJECTION INTRAVENOUS at 16:20

## 2024-10-08 RX ADMIN — Medication 0.4 MILLIGRAM(S): at 13:16

## 2024-10-08 RX ADMIN — Medication 50 MILLILITER(S): at 18:09

## 2024-10-08 RX ADMIN — RANOLAZINE 500 MILLIGRAM(S): 500 TABLET, EXTENDED RELEASE ORAL at 17:24

## 2024-10-08 RX ADMIN — Medication 5 UNIT(S): at 13:16

## 2024-10-08 RX ADMIN — Medication 1000 MILLIGRAM(S): at 17:23

## 2024-10-08 NOTE — H&P ADULT - NSHPLABSRESULTS_GEN_ALL_CORE
LABS:                            8.4    17.93 )-----------( 630      ( 08 Oct 2024 11:18 )             25.7     10-08    123[L]  |  92[L]  |  38.1[H]  ----------------------------<  243[H]  6.0[H]   |  17.0[L]  |  1.41[H]    Ca    8.7      08 Oct 2024 11:18    TPro  7.8  /  Alb  3.7  /  TBili  0.4  /  DBili  x   /  AST  18  /  ALT  11  /  AlkPhos  119  10-08      Urinalysis Basic - ( 08 Oct 2024 14:19 )    Color: Yellow / Appearance: Cloudy / S.013 / pH: x  Gluc: x / Ketone: Negative mg/dL  / Bili: Negative / Urobili: 0.2 mg/dL   Blood: x / Protein: Trace mg/dL / Nitrite: Negative   Leuk Esterase: Large / RBC: 2 /HPF /  /HPF   Sq Epi: x / Non Sq Epi: 1 /HPF / Bacteria: Many /HPF    ECG: normal   Prior ECG: Yes [x ] No [  ]    RADIOLOGY & ADDITIONAL STUDIES:    X-ray:  pending    TTE: pending

## 2024-10-08 NOTE — ED ADULT NURSE NOTE - NSSUHOSCREENINGYN_ED_ALL_ED
2 m.o. male with no significant past medical history who presents with chief complaint of cough. Mother states 4 days ago, she started to notice the patient had a cough with some episodes of SOB. She also reports nasal congestion. Mother and brother are also sick with a cough. She denies any fevers at home, appetite change, or decreased wet diapers. There are no other acute medical concerns at this time. Social hx: Z UTD; Lives with parents. PCP: Sonal Gray NP    Note written by Pino Muro, as dictated by Tip Winter MD 12:54 PM      The history is provided by the patient and the mother. No  was used. Pediatric Social History:         No past medical history on file. No past surgical history on file. Family History:   Problem Relation Age of Onset    Immunodeficiency Father        Social History     Socioeconomic History    Marital status: SINGLE     Spouse name: Not on file    Number of children: Not on file    Years of education: Not on file    Highest education level: Not on file   Social Needs    Financial resource strain: Not on file    Food insecurity - worry: Not on file    Food insecurity - inability: Not on file   [employer] needs - medical: Not on file   [employer] needs - non-medical: Not on file   Occupational History    Not on file   Tobacco Use    Smoking status: Not on file   Substance and Sexual Activity    Alcohol use: Not on file    Drug use: Not on file    Sexual activity: Not on file   Other Topics Concern    Not on file   Social History Narrative    Not on file         ALLERGIES: Patient has no known allergies. Review of Systems   Constitutional: Negative for appetite change and fever. HENT: Positive for congestion. Eyes: Negative for redness. Respiratory: Positive for cough. Genitourinary: Negative for decreased urine volume. All other systems reviewed and are negative.       Vitals: 02/26/19 1237   Pulse: 149   Temp: 97.5 °F (36.4 °C)   SpO2: 98%   Weight: 5.87 kg            Physical Exam   Constitutional: He appears well-developed and well-nourished. He is active. No distress. HENT:   Head: Anterior fontanelle is flat. Right Ear: Tympanic membrane normal.   Left Ear: Tympanic membrane normal.   Nose: Congestion present. Mouth/Throat: Mucous membranes are moist. Oropharynx is clear. Mild nasal congestion noted. Eyes: Conjunctivae are normal.   Neck: Normal range of motion. Neck supple. Cardiovascular: Normal rate and regular rhythm. No murmur heard. Pulmonary/Chest: Effort normal and breath sounds normal. He has no wheezes. He has no rhonchi. He exhibits no retraction. Abdominal: Soft. He exhibits no distension. There is no tenderness. Musculoskeletal: He exhibits no edema or tenderness. Neurological: He is alert. Skin: Skin is warm. No petechiae and no rash noted. Nursing note and vitals reviewed. Note written by Pino Downing, as dictated by Mildred Mcmahan MD 12:54 PM    MDM  Number of Diagnoses or Management Options  Viral URI with cough:   Diagnosis management comments: DDX: influenza, RSV, PNA, sepsis         Procedures    The patient's results have been reviewed with them and/or available family. Patient and/or family verbally conveyed their understanding and agreement of the patient's signs, symptoms, diagnosis, treatment and prognosis and additionally agree to follow up as recommended in the discharge instructions or to return to the Emergency Room should their condition change prior to their follow-up appointment. The patient/family verbally agrees with the care-plan and verbally conveys that all of their questions have been answered.  The discharge instructions have also been provided to the patient and/or family with some educational information regarding the patient's diagnosis as well a list of reasons why the patient would want to return to the ER prior to their follow-up appointment, should their condition change. Yes - the patient is able to be screened

## 2024-10-08 NOTE — CONSULT NOTE ADULT - ASSESSMENT
HPI:  Pt is a 73yoF hx CAD s/p PCI, PAD with chronic RLE wound s/p debridement on 7/9, HFpEF, HTN, s/p PPM, CVA, and UTI currently on 5/7 of  bactrim presenting w/ SOB x 3 days.    #Shortness of Breath  likely in the setting of acute on chronic HFpEF exacerbation   - pt non compliant with furosemide at home  - started on Furosemide 40 mg BID  - Cr 1.41, monitor renal function  - TTE pending  - EKG normal   - pBNP 70629  - Troponin x1 negative (30), repeat troponin     #Hyperkalemia  - pt K 6.0, outpatient K 5.9,   - spironolactone DCed in the outpatient office on 10/7  - repeat BMP, monitor      #HTN  - c/w Valsartan 160 mg x 1 day  - c/w ranolazine 500 mg Q12 hrs  - c/w amlodipine 10 mg x 1 day  - c/w isosorbide mononitrate 30 mg x1 day  - c/w metoprolol succinate 100 mg x 1 day    #HDL  - c/w atorvastatin 80 mg x 1 day  HPI:  Pt is a 73yoF hx CAD s/p PCI, PAD with chronic RLE wound s/p debridement on 7/9, HFpEF, HTN, s/p PPM, CVA, and UTI currently on 5/7 of  bactrim presenting w/ SOB x 3 days.    #Acute on Chronic Respiratory Failure with Hypoxia   likely in the setting of acute on chronic HFpEF exacerbation   - pt non compliant with furosemide at home  - started on Furosemide 40 mg BID  - Cr 1.41, monitor renal function  - TTE pending  - EKG normal   - pBNP 07540  - Troponin x1 negative (30), repeat troponin   - Low Sodium/DASH diet     #Hyperkalemia  - pt K 6.0, outpatient K 5.9,   - spironolactone DCed in the outpatient office on 10/7  - repeat BMP, monitor      #HTN  - c/w Valsartan 160 mg x 1 day  - c/w ranolazine 500 mg Q12 hrs  - c/w amlodipine 10 mg x 1 day  - c/w isosorbide mononitrate 30 mg x1 day  - c/w metoprolol succinate 100 mg x 1 day    #HDL  - c/w atorvastatin 80 mg x 1 day  HPI:  Pt is a 73yoF hx CAD s/p PCI, PAD with chronic RLE wound s/p debridement on 7/9, HFpEF, HTN, s/p PPM, CVA, and UTI currently on 5/7 of  bactrim presenting w/ SOB x 3 days.    #Acute on Chronic Respiratory Failure with Hypoxia   likely in the setting of acute on chronic HFpEF exacerbation   - pt non compliant with furosemide at home  - started on Furosemide 40 mg BID  - Cr 1.41, monitor renal function  - TTE pending  - EKG normal   - pBNP 21033  - Troponin x1 negative (30), repeat troponin   - Low Sodium/DASH diet     #Hyperkalemia  - pt K 6.0, outpatient K 5.9,   - spironolactone DCed in the outpatient office on 10/7  - repeat BMP, monitor K      #HTN  - c/w Valsartan 160 mg x 1 day  - c/w ranolazine 500 mg Q12 hrs  - c/w amlodipine 10 mg x 1 day  - c/w isosorbide mononitrate 30 mg x1 day  - c/w metoprolol succinate 100 mg x 1 day    #HDL  - c/w atorvastatin 80 mg x 1 day

## 2024-10-08 NOTE — ED ADULT TRIAGE NOTE - CHIEF COMPLAINT QUOTE
pt c/o difficulty breathing, started last night, HX CHF, shaking, HX stroke  A&Ox3, resp wnl, BP was elevated

## 2024-10-08 NOTE — ED PROVIDER NOTE - OBJECTIVE STATEMENT
Pt is a 73yoF hx CAD s/p PCI, PAD with chronic RLE wound s/p debridement on 7/9, HFpEF, HTN, s/p PPM, CVA, and UTI currently on 5/7 of  bactrim presenting w/ SOB x 3 days. Pt states she first noted SOB when walking short distances and an associated cough. Symptoms have progressively worsened until last night when she states she now gets SOB walking from bed to bathroom, and the cough is more frequent. Associated symptoms include dull chest pain that radiates to back when walking/ with physical activity. Pt also endorses bilateral flank pain and abdominal pain which she attributes to current UTI she is on antibiotics for. Pt recently saw cardiologist Dr. Allen 5 days ago for regular follow-up. Blood work done showed a K 5.9 and so patient stopped taking her "water pill" per his recommendation. Pt denies any fever, chills, vision changes, n/v/d, tingling/numbness in the extremities.  Pt endorses weakness "shakiness"' in the leg b/l when she tries walking, describing it more as "fatigue". At baseline patient denies use of home O2 and is able to walk normally. Pt denies any recent travel or sick contacts. Currently taking all medications as prescribed. NKDA. Denies EtOH, tobacco, or recreational drug use.

## 2024-10-08 NOTE — ED PROVIDER NOTE - ATTENDING CONTRIBUTION TO CARE
73-year-old female with history of CAD status post coronary stenting,  CHF,  status post permanent pacemaker, peripheral arterial disease and  chronic right lower extremity wound  presents to the ED referred by her physician for further evaluation of increasing shortness of breath times last 3 days.  Patient seen by cardiology/Dr. Allen on Friday and was told to stop spironolactone secondary  and potassium.  Patient now complaining of increasing shortness of breath with dyspnea on exertion and chest pain.  In ED patient awake and alert tachypneic requiring oxygen secondary to hypoxia.  PERRL,  mm moist, neck supple, heart regular, lungs positive bibasilar crackles, abdomen soft nontender, extremities no sinus edema, positive Ace wrap right lower extremity.   patient with elevated BNP hypertensive and in moderate respiratory distress will treat with p.o. nitrates IV Lasix.  Case discussed with cardiology and consult called

## 2024-10-08 NOTE — H&P ADULT - ASSESSMENT
Patient is a 74 year old female with a past medical history of CAD s/p PCI, PAD with chronic RLE wound s/p debridement on 07/09, HFpEF, HTN, cardiac pacemaker for Mobitz type II, CVA who is admitted due to CHF exacerbation and need for intravenous diuresis.    Acute hypoxic respiratory failure secondary to fluid overload  Acute on chronic HFpEF exacerbation secondary to medication noncompliance  History of CVA, CAD s/p PCI, HTN, HLD  - Patient not complaint with Lasix at home; Crackles auscultated  - Requiring 2L NC at this time  - proBNP elevated at 16k, creatinine elevated at 1.41 (above baseline)  - EKG without any acute changes, troponin negative x1  - CXR showing pulmonary vascular congestion  - Cardiology consulted in the ER    Plan:  - Started on Lasix 40mg IV Q12H\  - Continue aspirin 81mg daily and Plavix 75mg daily  - Continue metoprolol succinate 100mg daily  - Continue isosorbide mononitrate 30mg daily  - Continue amlodipine 10mg daily  - Continue ranolazine 500mg Q12H  - Continue valsartan 160mg daily  - Continue atorvastatin 80mg HS  - Follow up repeat troponin  - Follow up TTE    Acute kidney injury  Hyperkalemia  Hyponatremia, likely hypervolemic in the setting of CHF  - K elevated at 6.0; It was 5.9 as outpatient  - Spironolactone is being held  - S/p insulin and D50 in the emergency room  - Follow up repeat BMP tonight  - Follow up chemistry in the morning  - Consider Nephrology evaluation if Na does not normalize with diuresis    Urinary tract infection  Rule out pyelonephritis  - UA showing large leukocyte esterases  - WBC elevated at 17.93  - Patient was taking Macrobid as outpatient for two days  - Patient endorses left flank pain  - Started on ceftriaxone 1g Q24H  - Follow up noncontrast CTAP  - Follow up procal, atypical pneumonia work up, procal    Chronic RLE wound s/p debridement  - Not complaining of worsening symptoms at this time  - Follow up wound care consult   - Consider ID evaluation if white count does not resolve    Diabetes mellitus, type II  - Hold home metformin at this time  - Started on insulin sliding scale  - Monitor POCT glucose and adjust regimen as indicated  - Continue gabapentin at home dose of 800mg    Microcytic anemia, likely secondary to iron deficiency  - Continue ferrous sulfate  - No need to do anemia work up at this time      DVT/GI ppx: Heparin subq, Protonix  Diet: Carb consistent diet  Code Status: Full code  Dispo: Home in 1-2 days after diuresis Patient is a 74 year old female with a past medical history of CAD s/p PCI, PAD with chronic RLE wound s/p debridement on 07/09, HFpEF, HTN, cardiac pacemaker for Mobitz type II, CVA who is admitted due to CHF exacerbation and need for intravenous diuresis.    Acute hypoxic respiratory failure secondary to fluid overload  Acute on chronic HFpEF exacerbation secondary to medication noncompliance  History of CVA, CAD s/p PCI, HTN, HLD  - Patient not complaint with Lasix at home; Crackles auscultated  - Requiring 2L NC at this time  - proBNP elevated at 16k, creatinine elevated at 1.41 (above baseline)  - EKG without any acute changes, troponin negative x1  - CXR showing pulmonary vascular congestion  - Cardiology consulted in the ER    Plan:  - Started on Lasix 40mg IV Q12H\  - Continue aspirin 81mg daily and Plavix 75mg daily  - Continue metoprolol succinate 100mg daily  - Continue isosorbide mononitrate 30mg daily  - Continue amlodipine 10mg daily  - Continue ranolazine 500mg Q12H  - Continue valsartan 160mg daily  - Continue atorvastatin 80mg HS  - Follow up repeat troponin  - Follow up TTE    Acute kidney injury  Hyperkalemia  Hyponatremia, likely hypervolemic in the setting of CHF  - K elevated at 6.0; It was 5.9 as outpatient  - Spironolactone is being held  - S/p insulin and D50 in the emergency room  - Follow up repeat BMP tonight  - Follow up chemistry in the morning  - Nephrology consulted today, will see patient tomorrow    Urinary tract infection  Rule out pyelonephritis  - UA showing large leukocyte esterases  - WBC elevated at 17.93  - Patient was taking Macrobid as outpatient for two days  - Patient endorses left flank pain  - Started on ceftriaxone 1g Q24H  - Follow up noncontrast CTAP  - Follow up procal, atypical pneumonia work up, procal    Chronic RLE wound s/p debridement  - Not complaining of worsening symptoms at this time  - Follow up wound care consult   - Consider ID evaluation if white count does not resolve    Diabetes mellitus, type II  - Hold home metformin at this time  - Started on insulin sliding scale  - Monitor POCT glucose and adjust regimen as indicated  - Continue gabapentin at home dose of 800mg    Microcytic anemia, likely secondary to iron deficiency  - Continue ferrous sulfate  - No need to do anemia work up at this time      DVT/GI ppx: Heparin subq, Protonix  Diet: Carb consistent diet  Code Status: Full code  Dispo: Home in 1-2 days after diuresis

## 2024-10-08 NOTE — H&P ADULT - HISTORY OF PRESENT ILLNESS
Patient is a 74 year old female who presents to the emergency room from home with complaints of shortness of breath. She has a past medical history significant for CAD s/p PCI, PAD with chronic RLE wound s/p debridement on 07/09, HFpEF, HTN, cardiac pacemaker for Mobitz type II, CVA, and UTI. Patient was recently seen by her private cardiologist, Dr. Allen 5 days ago and was instructed to stop taking her "water pill" due to elevated potassium levels. She was supposed to stop taking spironolactone, but accidently also stopped taking Lasix. Patient endorses shortness of breath with ambulation, associated, cough, mild reproducible chest pain, and left flank pain. She also endorses shakiness when she walks. Patient has not been taking her Lasix and ranolazine. In the ER, she was found to have elevated BNP and K, for which she was given insulin cocktail. UA was also done and showed large leuk esterase, and hemogram showed elevated white count with left shift and anemia. Patient is to be admitted to the medical service for further work up and diuresis.

## 2024-10-08 NOTE — H&P ADULT - TIME BILLING
Time spent reviewing patient's chart, labwork, orders, documenting care, coordinating care with consultants, and discussing patient's care with family members.

## 2024-10-08 NOTE — ED PROVIDER NOTE - CLINICAL SUMMARY MEDICAL DECISION MAKING FREE TEXT BOX
patient was seen and evaluated in the ED for shortness of breath. Patient placed on 3L nasal cannula. On physical exam, patient was in no acute distress. Exam was significant for rhonchi across all lung fields; no  JVD or LE edema noted on exam. CBC, CMP, ECG, trop, CXR ordered. patient was seen and evaluated in the ED for shortness of breath. Patient placed on 3L nasal cannula. On physical exam, patient was in no acute distress. Exam was significant for rhonchi across all lung fields; no  JVD or LE edema noted on exam. CBC, CMP, ECG, trop, CXR ordered.    Progress Note: On re-exam, patient was found to be belly breathing; 02 sat 96% on 3L NC. Labs significant for WBC 17; K 6; Na 123, Glu 209; BNP 95334. Pt given insulin, lasix, and nitro. VBG and lactate ordered. patient was seen and evaluated in the ED for shortness of breath. Patient placed on 3L nasal cannula. On physical exam, patient was in no acute distress. Exam was significant for rhonchi across all lung fields; no  JVD or LE edema noted on exam. CBC, CMP, ECG, trop, CXR ordered.    Progress Note: On re-exam, patient was found to be belly breathing; 02 sat 96% on 3L NC. Labs significant for WBC 17; K 6; Na 123, Glu 209; BNP 98361. Pt given insulin, lasix, and nitro. VBG and lactate ordered. Echo ordered. Consulted cardiology

## 2024-10-08 NOTE — ED PROVIDER NOTE - PHYSICAL EXAMINATION
Gen: No acute distress, comfortably in bed. On 3L NC O2 sat 96%  HENT: Atraumatic, normocephalic, neck is supple without adenopathy, no JVD  Eyes: PERRLA, conjuctivae are clear, non-icteric sclera  NEURO: A&Ox3, no focal deficits, moving all extremities spontaneously, CN II-XII grossly intact  Resp: no wrr, non-labored breathing, rhonchi auscultated across all lung fields  Cardio: Regular rate and rhythm, S1/S2 heard, no murmurs, gallops or rubs  Abdominal: Soft, non-tender, non-distended, no appreciable masses, normoactive bowel sounds  : No CVA tenderness  Extremities: Nontender, no clubbing, cyanosis, or edema  Skin: Warm, dry, intact without rashes or lesions

## 2024-10-08 NOTE — ED PROVIDER NOTE - WR ORDER NAME 1
"Jenni Toth is a 35 y.o. female patient.    Temp: 97.3 °F (36.3 °C) (05/24/18 0802)  Pulse: 103 (05/24/18 0802)  Resp: 16 (05/24/18 0802)  BP: 135/80 (05/24/18 0802)  SpO2: 100 % (05/24/18 0802)  Weight: 90.1 kg (198 lb 10.2 oz) (05/16/18 0427)  Height: 5' 4" (162.6 cm) (05/16/18 0427)    PICC  Date/Time: 5/24/2018 9:47 AM  Performed by: HERNANDEZ GUERIN  Assisting provider: KIKI NEWMAN  Consent Done: Yes  Time out: Immediately prior to procedure a time out was called to verify the correct patient, procedure, equipment, support staff and site/side marked as required  Indications: med administration and vascular access  Anesthesia: local infiltration  Local anesthetic: lidocaine 1% without epinephrine  Anesthetic Total (mL): 2  Description of findings: PICC  Preparation: skin prepped with ChloraPrep  Skin prep agent dried: skin prep agent completely dried prior to procedure  Sterile barriers: all five maximum sterile barriers used - cap, mask, sterile gown, sterile gloves, and large sterile sheet  Hand hygiene: hand hygiene performed prior to central venous catheter insertion  Location details: right basilic  Catheter type: double lumen  Catheter size: 5 Fr  Catheter Length: 36cm    Ultrasound guidance: yes  Vessel Caliber: medium and patent, compressibility normal  Vascular Doppler: not done  Needle advanced into vessel with real time Ultrasound guidance.  Guidewire confirmed in vessel.  Image recorded and saved.  Sterile sheath used.  Number of attempts: 1  Post-procedure: blood return through all ports, chlorhexidine patch and sterile dressing applied  Technical procedures used: 2CG  Specimens: No    Assessment: placement verified by x-ray  Complications: none          Kiki Newman  5/24/2018    " Xray Chest 1 View- PORTABLE-Urgent

## 2024-10-08 NOTE — ED ADULT NURSE NOTE - OBJECTIVE STATEMENT
A&Ox4, RR slightly tachypneic. Skin is warm and dry. PT brought in by son C/O SOB that began last night associated with dry non productive cough and chills. Denies chest pain. PT had outpatient blood work done yesterday showing anemia, unsure of number and high potassium of 5.9. Hx of CHF, HTN and DM. Placed on 2L NC due low O2 sat od 88% now 96%. IV placed and medicated per orders.

## 2024-10-08 NOTE — H&P ADULT - NSHPPHYSICALEXAM_GEN_ALL_CORE
Constitutional: Comfortable . No acute distress.   HEENT: Atraumatic and normocephalic , neck is supple . no JVD. No carotid bruit.  CNS: A&Ox3. No focal deficits.   Respiratory: unlabored on 2 L NC, Crackles appreciated on the auscultation   Cardiovascular: RRR normal s1 s2. No murmur. No rubs or gallop.  Gastrointestinal: Soft, non-tender. +Bowel sounds.   Genitourinary: Positive for left flank pain  Extremities: 2+ Peripheral Pulses, No clubbing, cyanosis, or edema  Psychiatric: Calm . no agitation.   Skin: Warm and dry, no ulcers on extremities

## 2024-10-08 NOTE — CONSULT NOTE ADULT - SUBJECTIVE AND OBJECTIVE BOX
[facility]                                              CARDIOLOGY AT [address], [place] [address]                                             Telephone: [phone]. Fax:[phone]                                                       CARDIOLOGY CONSULTATION NOTE                                                                                             History obtained by: Patient and medical record  Community Cardiologist: Dr Allen    obtained: Yes [  ] No [x  ]  Reason for Consultation: shortness of breath  Available out pt records reviewed: Yes [x  ] No [  ]    Chief complaint:    Patient is a 74y old  Female who presents with a chief complaint of difficulty breathing.    HPI:  Pt is a 73yoF hx CAD s/p PCI, PAD with chronic RLE wound s/p debridement on , HFpEF, HTN, s/p PPM, CVA, and UTI currently on  of  bactrim presenting w/ SOB x 3 days. Pt states she first noted SOB when walking short distances and an associated cough. Symptoms have progressively worsened until last night when she states she now gets SOB walking from bed to bathroom, and the cough is more frequent. Associated symptoms include dull chest pain that radiates to back when walking/ with physical activity. Pt also endorses bilateral flank pain and abdominal pain which she attributes to current UTI she is on antibiotics for. Pt recently saw cardiologist Dr. Allen 5 days ago for regular follow-up. Blood work done showed a K 5.9 and so patient stopped taking spironolactone per his recommendation. Pt's medications were confirmed to the office visit: Valsartan 160 mg x1, Gabapentin 800 mg x 1 day, Amlodipine 10 mg 1 day, clopidogrel 75 mg x 1 day, isosorbide mononitrate 30 mg ER x 1 day, metoprolol succinate 100 mg x1 day, pantoprazole 20 mg x 1 day, atorvastatin 80 mg x 1 day, metformin 1,000 mg x 2 day. Pt is also prescribed furosemide 40 mg x1 day and ranolazine 500 mg x every 12 hours which patient wasn't currently taking.   Pt denies any fever, chills, vision changes, n/v/d, tingling/numbness in the extremities.  Pt endorses weakness "shakiness"' in the leg b/l when she tries walking, describing it more as "fatigue". At baseline patient denies use of home O2 and is able to walk normally. Pt denies any recent travel or sick contacts. Denies EtOH, tobacco, or recreational drug use.        CARDIAC TESTING   ECHO: pending     STRESS:  outpatient     CATH: 2011    ELECTROPHYSIOLOGY: [facility]     PAST MEDICAL HISTORY  DM (diabetes mellitus)    HTN (hypertension)    Acute otitis media, unspecified otitis media type    H/O gastroesophageal reflux (GERD)    Cardiac pacemaker    CVA (cerebrovascular accident)    CVA (cerebrovascular accident)    PAD (peripheral artery disease)    Wound of right leg        PAST SURGICAL HISTORY  History of benign brain tumor    Cataract    History of biopsy    Cardiac pacemaker    S/P angiogram of extremity        SOCIAL HISTORY:  Denies smoking/alcohol/drugs  CIGARETTES:   denies  ALCOHOL: denies  DRUGS: denies     FAMILY HISTORY:  FH: asthma  Son      Sudden Cardiac Death in First degree relative: Yes [  ] No [x  ]    HOME MEDICATIONS:  amLODIPine 10 mg oral tablet: 1 tab(s) orally once a day (2024 03:16)  clopidogrel 75 mg oral tablet: 1 tab(s) orally once a day (2024 03:16)  ferrous sulfate 325 mg (65 mg elemental iron) oral tablet: 1 tab(s) orally 3 times a day (2024 03:16)  insulin glargine 100 units/mL subcutaneous solution: 30 unit(s) subcutaneous once a day (at bedtime) (2024 03:16)  isosorbide mononitrate 30 mg oral tablet, extended release: 1 tab(s) orally once a day (2024 03:16)  pantoprazole 40 mg oral delayed release tablet: 1 tab(s) orally once a day (2024 03:16)      CURRENT CARDIAC MEDICATIONS:  furosemide   Injectable 40 milliGRAM(s) IV Push two times a day      CURRENT OTHER MEDICATIONS:      ALLERGIES:   No Known Allergies      REVIEW OF SYMPTOMS:   CONSTITUTIONAL: No fever, no chills, no weight loss, no weight gain, no fatigue   ENMT:  No vertigo; No sinus or throat pain  NECK: No pain or stiffness  CARDIOVASCULAR: No chest pain, + dyspnea, no syncope/presyncope, no palpitations, no dizziness, no Orthopnea, no Paroxsymal nocturnal dyspnea  RESPIRATORY: + Shortness of breath, + cough, no wheezing  : No dysuria, no hematuria   GI: No dark color stool, no nausea, no diarrhea, no constipation, + abdominal pain   NEURO: No headache, no slurred speech   MUSCULOSKELETAL: No joint pain or swelling; No muscle, back, or extremity pain  PSYCH: No agitation, no anxiety.    ALL OTHER REVIEW OF SYSTEMS ARE NEGATIVE.    VITAL SIGNS:  T(C): 37.2 (10-08-24 @ 13:03), Max: 37.2 (10-08-24 @ 13:03)  T(F): 99 (10-08-24 @ 13:03), Max: 99 (10-08-24 @ 13:03)  HR: 83 (10-08-24 @ :46) (66 - 83)  BP: 135/70 (10-08-24 @ 13:46) (135/70 - 175/76)  RR: 26 (10-08-24 @ :46) (20 - 28)  SpO2: 97% (10-08-24 @ :46) (88% - 97%)    INTAKE AND OUTPUT:       PHYSICAL EXAM:  Constitutional: Comfortable . No acute distress.   HEENT: Atraumatic and normocephalic , neck is supple . no JVD. No carotid bruit.  CNS: A&Ox3. No focal deficits.   Respiratory: unlabored on 2 L NC, Crackles appreciated on the auscultation   Cardiovascular: RRR normal s1 s2. No murmur. No rubs or gallop.  Gastrointestinal: Soft, non-tender. +Bowel sounds.   Extremities: 2+ Peripheral Pulses, No clubbing, cyanosis, or edema  Psychiatric: Calm . no agitation.   Skin: Warm and dry, no ulcers on extremities     LABS:                            8.4    17.93 )-----------( 630      ( 08 Oct 2024 11:18 )             25.7     10-08    123[L]  |  92[L]  |  38.1[H]  ----------------------------<  243[H]  6.0[H]   |  17.0[L]  |  1.41[H]    Ca    8.7      08 Oct 2024 11:18    TPro  7.8  /  Alb  3.7  /  TBili  0.4  /  DBili  x   /  AST  18  /  ALT  11  /  AlkPhos  119  10-08      Urinalysis Basic - ( 08 Oct 2024 14:19 )    Color: Yellow / Appearance: Cloudy / S.013 / pH: x  Gluc: x / Ketone: Negative mg/dL  / Bili: Negative / Urobili: 0.2 mg/dL   Blood: x / Protein: Trace mg/dL / Nitrite: Negative   Leuk Esterase: Large / RBC: 2 /HPF /  /HPF   Sq Epi: x / Non Sq Epi: 1 /HPF / Bacteria: Many /HPF              INTERPRETATION OF TELEMETRY:     ECG: normal   Prior ECG: Yes [x ] No [  ]    RADIOLOGY & ADDITIONAL STUDIES:    X-ray:  pending    TTE: pending                                                 [facility]                                              CARDIOLOGY AT [address], [place] [address]                                             Telephone: [phone]. Fax:[phone]                                                       CARDIOLOGY CONSULTATION NOTE                                                                                             History obtained by: Patient and medical record  Community Cardiologist: Dr Allen    obtained: Yes [  ] No [x  ]  Reason for Consultation: shortness of breath  Available out pt records reviewed: Yes [x  ] No [  ]    Chief complaint:    Patient is a 74y old  Female who presents with a chief complaint of difficulty breathing.    HPI:  Pt is a 73yoF hx CAD s/p PCI, PAD with chronic RLE wound s/p debridement on , HFpEF, HTN, s/p PPM, CVA, and UTI currently on  of  bactrim presenting w/ SOB x 3 days. Pt states she first noted SOB when walking short distances and an associated cough. Symptoms have progressively worsened until last night when she states she now gets SOB walking from bed to bathroom, and the cough is more frequent. Associated symptoms include dull chest pain that radiates to back when walking/ with physical activity. Pt also endorses bilateral flank pain and abdominal pain which she attributes to current UTI she is on antibiotics for. Pt recently saw cardiologist Dr. Allen 5 days ago for regular follow-up. Blood work done showed a K 5.9 and so patient stopped taking spironolactone per his recommendation. Pt's medications were confirmed to the office visit: Valsartan 160 mg x1, Gabapentin 800 mg x 1 day, Amlodipine 10 mg 1 day, clopidogrel 75 mg x 1 day, isosorbide mononitrate 30 mg ER x 1 day, metoprolol succinate 100 mg x1 day, pantoprazole 20 mg x 1 day, atorvastatin 80 mg x 1 day, metformin 1,000 mg x 2 day, and aspirin 81 mg x 1 day.  Pt is also prescribed furosemide 40 mg x1 day and ranolazine 500 mg x every 12 hours which patient wasn't currently taking.   Pt denies any fever, chills, vision changes, n/v/d, tingling/numbness in the extremities.  Pt endorses weakness "shakiness"' in the leg b/l when she tries walking, describing it more as "fatigue". At baseline patient denies use of home O2 and is able to walk normally. Pt denies any recent travel or sick contacts. Denies EtOH, tobacco, or recreational drug use.        CARDIAC TESTING   ECHO: pending     STRESS:  outpatient     CATH: 2011    ELECTROPHYSIOLOGY: [facility]     PAST MEDICAL HISTORY  DM (diabetes mellitus)    HTN (hypertension)    Acute otitis media, unspecified otitis media type    H/O gastroesophageal reflux (GERD)    Cardiac pacemaker    CVA (cerebrovascular accident)    CVA (cerebrovascular accident)    PAD (peripheral artery disease)    Wound of right leg        PAST SURGICAL HISTORY  History of benign brain tumor    Cataract    History of biopsy    Cardiac pacemaker    S/P angiogram of extremity        SOCIAL HISTORY:  Denies smoking/alcohol/drugs  CIGARETTES:   denies  ALCOHOL: denies  DRUGS: denies     FAMILY HISTORY:  FH: asthma  Son      Sudden Cardiac Death in First degree relative: Yes [  ] No [x  ]    HOME MEDICATIONS:  amLODIPine 10 mg oral tablet: 1 tab(s) orally once a day (2024 03:16)  clopidogrel 75 mg oral tablet: 1 tab(s) orally once a day (2024 03:16)  ferrous sulfate 325 mg (65 mg elemental iron) oral tablet: 1 tab(s) orally 3 times a day (2024 03:16)  insulin glargine 100 units/mL subcutaneous solution: 30 unit(s) subcutaneous once a day (at bedtime) (2024 03:16)  isosorbide mononitrate 30 mg oral tablet, extended release: 1 tab(s) orally once a day (2024 03:16)  pantoprazole 40 mg oral delayed release tablet: 1 tab(s) orally once a day (2024 03:16)      CURRENT CARDIAC MEDICATIONS:  furosemide   Injectable 40 milliGRAM(s) IV Push two times a day      CURRENT OTHER MEDICATIONS:      ALLERGIES:   No Known Allergies      REVIEW OF SYMPTOMS:   CONSTITUTIONAL: No fever, no chills, no weight loss, no weight gain, no fatigue   ENMT:  No vertigo; No sinus or throat pain  NECK: No pain or stiffness  CARDIOVASCULAR: No chest pain, + dyspnea, no syncope/presyncope, no palpitations, no dizziness, no Orthopnea, no Paroxsymal nocturnal dyspnea  RESPIRATORY: + Shortness of breath, + cough, no wheezing  : No dysuria, no hematuria   GI: No dark color stool, no nausea, no diarrhea, no constipation, + abdominal pain   NEURO: No headache, no slurred speech   MUSCULOSKELETAL: No joint pain or swelling; No muscle, back, or extremity pain  PSYCH: No agitation, no anxiety.    ALL OTHER REVIEW OF SYSTEMS ARE NEGATIVE.    VITAL SIGNS:  T(C): 37.2 (10-08-24 @ 13:03), Max: 37.2 (10-08-24 @ 13:03)  T(F): 99 (10-08-24 @ 13:03), Max: 99 (10-08-24 @ 13:03)  HR: 83 (10-08-24 @ 13:46) (66 - 83)  BP: 135/70 (10-08-24 @ 13:46) (135/70 - 175/76)  RR: 26 (10-08-24 @ :46) (20 - 28)  SpO2: 97% (10-08-24 @ :46) (88% - 97%)    INTAKE AND OUTPUT:       PHYSICAL EXAM:  Constitutional: Comfortable . No acute distress.   HEENT: Atraumatic and normocephalic , neck is supple . no JVD. No carotid bruit.  CNS: A&Ox3. No focal deficits.   Respiratory: unlabored on 2 L NC, Crackles appreciated on the auscultation   Cardiovascular: RRR normal s1 s2. No murmur. No rubs or gallop.  Gastrointestinal: Soft, non-tender. +Bowel sounds.   Extremities: 2+ Peripheral Pulses, No clubbing, cyanosis, or edema  Psychiatric: Calm . no agitation.   Skin: Warm and dry, no ulcers on extremities     LABS:                            8.4    17.93 )-----------( 630      ( 08 Oct 2024 11:18 )             25.7     10-08    123[L]  |  92[L]  |  38.1[H]  ----------------------------<  243[H]  6.0[H]   |  17.0[L]  |  1.41[H]    Ca    8.7      08 Oct 2024 11:18    TPro  7.8  /  Alb  3.7  /  TBili  0.4  /  DBili  x   /  AST  18  /  ALT  11  /  AlkPhos  119  10-08      Urinalysis Basic - ( 08 Oct 2024 14:19 )    Color: Yellow / Appearance: Cloudy / S.013 / pH: x  Gluc: x / Ketone: Negative mg/dL  / Bili: Negative / Urobili: 0.2 mg/dL   Blood: x / Protein: Trace mg/dL / Nitrite: Negative   Leuk Esterase: Large / RBC: 2 /HPF /  /HPF   Sq Epi: x / Non Sq Epi: 1 /HPF / Bacteria: Many /HPF              INTERPRETATION OF TELEMETRY:     ECG: normal   Prior ECG: Yes [x ] No [  ]    RADIOLOGY & ADDITIONAL STUDIES:    X-ray:  pending    TTE: pending

## 2024-10-08 NOTE — ED PROVIDER NOTE - PROGRESS NOTE DETAILS
Patient improved with IV Lasix, hyperkalemia treated with IV insulin.  Patient seen and evaluated by cardiology will admit to telemetry

## 2024-10-08 NOTE — PATIENT PROFILE ADULT - VISION (WITH CORRECTIVE LENSES IF THE PATIENT USUALLY WEARS THEM):
Wears prescriptions/Normal vision: sees adequately in most situations; can see medication labels, newsprint

## 2024-10-08 NOTE — ED PROVIDER NOTE - CARE PLAN
1 Principal Discharge DX:	CHF exacerbation  Secondary Diagnosis:	Hyponatremia  Secondary Diagnosis:	Acute lower UTI (urinary tract infection)

## 2024-10-08 NOTE — PHYSICAL THERAPY INITIAL EVALUATION ADULT - PERTINENT HX OF CURRENT PROBLEM, REHAB EVAL
Patient is a 74 year old female with a past medical history of CAD s/p PCI, PAD with chronic RLE wound s/p debridement on 07/09, HFpEF, HTN, cardiac pacemaker for Mobitz type II, CVA who is admitted due to CHF exacerbation and need for intravenous diuresis.

## 2024-10-09 LAB
A1C WITH ESTIMATED AVERAGE GLUCOSE RESULT: 7.1 % — HIGH (ref 4–5.6)
ALBUMIN SERPL ELPH-MCNC: 3.8 G/DL — SIGNIFICANT CHANGE UP (ref 3.3–5.2)
ALP SERPL-CCNC: 89 U/L — SIGNIFICANT CHANGE UP (ref 40–120)
ALT FLD-CCNC: 11 U/L — SIGNIFICANT CHANGE UP
ANION GAP SERPL CALC-SCNC: 14 MMOL/L — SIGNIFICANT CHANGE UP (ref 5–17)
AST SERPL-CCNC: 11 U/L — SIGNIFICANT CHANGE UP
BASOPHILS # BLD AUTO: 0.06 K/UL — SIGNIFICANT CHANGE UP (ref 0–0.2)
BASOPHILS NFR BLD AUTO: 0.5 % — SIGNIFICANT CHANGE UP (ref 0–2)
BILIRUB SERPL-MCNC: 0.3 MG/DL — LOW (ref 0.4–2)
BUN SERPL-MCNC: 38.1 MG/DL — HIGH (ref 8–20)
CALCIUM SERPL-MCNC: 8.8 MG/DL — SIGNIFICANT CHANGE UP (ref 8.4–10.5)
CHLORIDE SERPL-SCNC: 95 MMOL/L — LOW (ref 96–108)
CO2 SERPL-SCNC: 21 MMOL/L — LOW (ref 22–29)
CREAT SERPL-MCNC: 1.52 MG/DL — HIGH (ref 0.5–1.3)
CRP SERPL-MCNC: 26 MG/L — HIGH
EGFR: 36 ML/MIN/1.73M2 — LOW
EOSINOPHIL # BLD AUTO: 0.25 K/UL — SIGNIFICANT CHANGE UP (ref 0–0.5)
EOSINOPHIL NFR BLD AUTO: 1.9 % — SIGNIFICANT CHANGE UP (ref 0–6)
ERYTHROCYTE [SEDIMENTATION RATE] IN BLOOD: 72 MM/HR — HIGH (ref 0–20)
ESTIMATED AVERAGE GLUCOSE: 157 MG/DL — HIGH (ref 68–114)
FERRITIN SERPL-MCNC: 102 NG/ML — SIGNIFICANT CHANGE UP (ref 13–330)
GLUCOSE BLDC GLUCOMTR-MCNC: 192 MG/DL — HIGH (ref 70–99)
GLUCOSE BLDC GLUCOMTR-MCNC: 204 MG/DL — HIGH (ref 70–99)
GLUCOSE BLDC GLUCOMTR-MCNC: 280 MG/DL — HIGH (ref 70–99)
GLUCOSE BLDC GLUCOMTR-MCNC: 93 MG/DL — SIGNIFICANT CHANGE UP (ref 70–99)
GLUCOSE SERPL-MCNC: 82 MG/DL — SIGNIFICANT CHANGE UP (ref 70–99)
HCT VFR BLD CALC: 26 % — LOW (ref 34.5–45)
HGB BLD-MCNC: 8.1 G/DL — LOW (ref 11.5–15.5)
IMM GRANULOCYTES NFR BLD AUTO: 1.2 % — HIGH (ref 0–0.9)
IRON SATN MFR SERPL: 12 % — LOW (ref 14–50)
IRON SATN MFR SERPL: 33 UG/DL — LOW (ref 37–145)
LYMPHOCYTES # BLD AUTO: 2.81 K/UL — SIGNIFICANT CHANGE UP (ref 1–3.3)
LYMPHOCYTES # BLD AUTO: 21.6 % — SIGNIFICANT CHANGE UP (ref 13–44)
MAGNESIUM SERPL-MCNC: 2.2 MG/DL — SIGNIFICANT CHANGE UP (ref 1.6–2.6)
MCHC RBC-ENTMCNC: 25.7 PG — LOW (ref 27–34)
MCHC RBC-ENTMCNC: 31.2 GM/DL — LOW (ref 32–36)
MCV RBC AUTO: 82.5 FL — SIGNIFICANT CHANGE UP (ref 80–100)
MONOCYTES # BLD AUTO: 0.87 K/UL — SIGNIFICANT CHANGE UP (ref 0–0.9)
MONOCYTES NFR BLD AUTO: 6.7 % — SIGNIFICANT CHANGE UP (ref 2–14)
NEUTROPHILS # BLD AUTO: 8.88 K/UL — HIGH (ref 1.8–7.4)
NEUTROPHILS NFR BLD AUTO: 68.1 % — SIGNIFICANT CHANGE UP (ref 43–77)
PHOSPHATE SERPL-MCNC: 3.5 MG/DL — SIGNIFICANT CHANGE UP (ref 2.4–4.7)
PLATELET # BLD AUTO: 590 K/UL — HIGH (ref 150–400)
POTASSIUM SERPL-MCNC: 4.7 MMOL/L — SIGNIFICANT CHANGE UP (ref 3.5–5.3)
POTASSIUM SERPL-SCNC: 4.7 MMOL/L — SIGNIFICANT CHANGE UP (ref 3.5–5.3)
PROCALCITONIN SERPL-MCNC: 0.1 NG/ML — SIGNIFICANT CHANGE UP (ref 0.02–0.1)
PROT SERPL-MCNC: 7.7 G/DL — SIGNIFICANT CHANGE UP (ref 6.6–8.7)
RBC # BLD: 3.15 M/UL — LOW (ref 3.8–5.2)
RBC # FLD: 16.3 % — HIGH (ref 10.3–14.5)
SODIUM SERPL-SCNC: 130 MMOL/L — LOW (ref 135–145)
TIBC SERPL-MCNC: 283 UG/DL — SIGNIFICANT CHANGE UP (ref 220–430)
TRANSFERRIN SERPL-MCNC: 198 MG/DL — SIGNIFICANT CHANGE UP (ref 192–382)
WBC # BLD: 13.02 K/UL — HIGH (ref 3.8–10.5)
WBC # FLD AUTO: 13.02 K/UL — HIGH (ref 3.8–10.5)

## 2024-10-09 PROCEDURE — 99222 1ST HOSP IP/OBS MODERATE 55: CPT

## 2024-10-09 PROCEDURE — 99233 SBSQ HOSP IP/OBS HIGH 50: CPT | Mod: 25

## 2024-10-09 PROCEDURE — 99233 SBSQ HOSP IP/OBS HIGH 50: CPT

## 2024-10-09 RX ORDER — METOPROLOL TARTRATE 50 MG
2.5 TABLET ORAL EVERY 6 HOURS
Refills: 0 | Status: DISCONTINUED | OUTPATIENT
Start: 2024-10-09 | End: 2024-10-14

## 2024-10-09 RX ADMIN — FUROSEMIDE 40 MILLIGRAM(S): 10 INJECTION INTRAVENOUS at 05:31

## 2024-10-09 RX ADMIN — Medication 4: at 12:34

## 2024-10-09 RX ADMIN — Medication 81 MILLIGRAM(S): at 11:33

## 2024-10-09 RX ADMIN — Medication 325 MILLIGRAM(S): at 22:24

## 2024-10-09 RX ADMIN — Medication 75 MILLIGRAM(S): at 11:33

## 2024-10-09 RX ADMIN — RANOLAZINE 500 MILLIGRAM(S): 500 TABLET, EXTENDED RELEASE ORAL at 17:21

## 2024-10-09 RX ADMIN — Medication 325 MILLIGRAM(S): at 13:51

## 2024-10-09 RX ADMIN — VALSARTAN 160 MILLIGRAM(S): 320 TABLET ORAL at 05:31

## 2024-10-09 RX ADMIN — Medication 10 MILLIGRAM(S): at 11:36

## 2024-10-09 RX ADMIN — ATORVASTATIN CALCIUM 80 MILLIGRAM(S): 10 TABLET, FILM COATED ORAL at 22:24

## 2024-10-09 RX ADMIN — Medication 100 MILLIGRAM(S): at 05:31

## 2024-10-09 RX ADMIN — Medication 5000 UNIT(S): at 17:20

## 2024-10-09 RX ADMIN — Medication 30 MILLIGRAM(S): at 11:34

## 2024-10-09 RX ADMIN — PANTOPRAZOLE SODIUM 20 MILLIGRAM(S): 40 TABLET, DELAYED RELEASE ORAL at 06:10

## 2024-10-09 RX ADMIN — Medication 2: at 17:20

## 2024-10-09 RX ADMIN — Medication 325 MILLIGRAM(S): at 05:31

## 2024-10-09 RX ADMIN — RANOLAZINE 500 MILLIGRAM(S): 500 TABLET, EXTENDED RELEASE ORAL at 05:31

## 2024-10-09 RX ADMIN — Medication 5000 UNIT(S): at 05:31

## 2024-10-09 RX ADMIN — FUROSEMIDE 40 MILLIGRAM(S): 10 INJECTION INTRAVENOUS at 13:51

## 2024-10-09 NOTE — CONSULT NOTE ADULT - SUBJECTIVE AND OBJECTIVE BOX
Chief Complaint:  Patient is a 74y old  Female who presents with a chief complaint of Shortness of breath (09 Oct 2024 11:26)    HPI:  73 y/o F w/ PMHx CAD s/p PCI, PAD with chronic RLE wound s/p debridement on 07/09, HFpEF, HTN, cardiac pacemaker for Mobitz type II, CVA, and UT who presents w/ complaint of shortness of breath. Five days ago, pt saw private cardiologist, Dr. Allen, was instructed to stop taking her "water pill" due to elevated potassium levels. She was supposed to stop taking spironolactone, but accidently also stopped taking Lasix. Patient endorses shortness of breath with ambulation, associated, cough, mild reproducible chest pain, and left flank pain. She also endorses shakiness when she walks. Patient has not been taking her Lasix and ranolazine. In ER, she was found to have elevated BNP and K, for which she was given insulin cocktail. UA was also done and showed large leuk esterase, and hemogram showed elevated white count with left shift and anemia. Patient was consulted to GI for anemia workup. Pt states anemia is chronic. Denies abdominal pain, diarrhea, black or bloody stool.  Had colonoscopy 10 years ago. Denies any family hx of GI malignancy.     : [id number] used, beside nurse also helped to translate.     PAST MEDICAL & SURGICAL HISTORY:  DM (diabetes mellitus)  HTN (hypertension)  H/O gastroesophageal reflux (GERD)  Cardiac pacemaker  MICRA for mobitz type 11  CVA (cerebrovascular accident)  resdiual right sided weakness  CVA (cerebrovascular accident)  PAD (peripheral artery disease)  Wound of right leg  History of benign brain tumor  Cataract  History of biopsy  Cardiac pacemaker  S/P angiogram of extremity    REVIEW OF SYSTEMS:   General: Negative  HEENT: Negative  CV: Negative  Respiratory: +SOB  GI: See HPI  : Negative  MSK: Negative  Hematologic: Negative  Skin: Negative    MEDICATIONS:   MEDICATIONS  (STANDING):  aspirin  chewable 81 milliGRAM(s) Oral daily  atorvastatin 80 milliGRAM(s) Oral at bedtime  clopidogrel Tablet 75 milliGRAM(s) Oral daily  dextrose 5%. 1000 milliLiter(s) (100 mL/Hr) IV Continuous <Continuous>  ferrous    sulfate 325 milliGRAM(s) Oral three times a day  furosemide   Injectable 40 milliGRAM(s) IV Push two times a day  gabapentin 800 milliGRAM(s) Oral Once  glucagon  Injectable 1 milliGRAM(s) IntraMuscular once  heparin   Injectable 5000 Unit(s) SubCutaneous every 12 hours  insulin lispro (ADMELOG) corrective regimen sliding scale   SubCutaneous three times a day before meals  isosorbide   mononitrate ER Tablet (IMDUR) 30 milliGRAM(s) Oral daily  metoprolol succinate  milliGRAM(s) Oral daily  pantoprazole    Tablet 20 milliGRAM(s) Oral before breakfast  ranolazine 500 milliGRAM(s) Oral two times a day    MEDICATIONS  (PRN):  dextrose Oral Gel 15 Gram(s) Oral once PRN Blood Glucose LESS THAN 70 milliGRAM(s)/deciliter  melatonin 3 milliGRAM(s) Oral at bedtime PRN Insomnia  metoprolol tartrate Injectable 2.5 milliGRAM(s) IV Push every 6 hours PRN SBP > 160    DIET:  Diet, Regular:   Consistent Carbohydrate No Snacks (CSTCHO) (10-08-24 @ 16:57) [Active]    ALLERGIES:   Allergies  No Known Allergies  Intolerances    Substance Use:   (  ) never used  (  ) other:  Tobacco Usage:  (   ) never smoked   (   ) former smoker   (   ) current smoker  (     ) pack year  (        ) last cigarette date  Alcohol Usage:    Family History   IBD (  ) Yes   (  ) No  GI Malignancy (  )  Yes    ( x ) No    Health Management  Last Colonoscopy: 10 years ago  Last Endoscopy: n/a    VITAL SIGNS:   Vital Signs Last 24 Hrs  T(C): 36.4 (09 Oct 2024 11:15), Max: 36.7 (09 Oct 2024 04:16)  T(F): 97.6 (09 Oct 2024 11:15), Max: 98 (09 Oct 2024 04:16)  HR: 83 (09 Oct 2024 12:37) (59 - 83)  BP: 155/67 (09 Oct 2024 12:37) (135/70 - 174/76)  BP(mean): 89 (08 Oct 2024 13:46) (89 - 89)  RR: 18 (09 Oct 2024 11:15) (18 - 26)  SpO2: 96% (09 Oct 2024 11:15) (96% - 100%)    Parameters below as of 09 Oct 2024 11:15  Patient On (Oxygen Delivery Method): nasal cannula    I&O's Summary    PHYSICAL EXAM:   GENERAL:  No acute distress  HEENT:  NC/AT, conjunctiva clear, sclera anicteric  CHEST:  increased work of breathing, on 2 L NC, R>L crackles in b/l lung bases  HEART:  Regular rate  ABDOMEN:  Soft, non-tender, non-distended, no rebound or guarding; rectal exam shows dark brown stool   EXTREMITIES: No edema  SKIN:  Warm, dry  NEURO:  Calm, cooperative    LABS:                        8.1    13.02 )-----------( 590      ( 09 Oct 2024 04:43 )             26.0     Hemoglobin: 8.1 g/dL (10-09-24 @ 04:43)  Hemoglobin: 8.4 g/dL (10-08-24 @ 11:18)    10-09    130[L]  |  95[L]  |  38.1[H]  ----------------------------<  82  4.7   |  21.0[L]  |  1.52[H]    Ca    8.8      09 Oct 2024 04:43  Phos  3.5     10-09  Mg     2.2     10-09    TPro  7.7  /  Alb  3.8  /  TBili  0.3[L]  /  DBili  x   /  AST  11  /  ALT  11  /  AlkPhos  89  10-09    LIVER FUNCTIONS - ( 09 Oct 2024 04:43 )  Alb: 3.8 g/dL / Pro: 7.7 g/dL / ALK PHOS: 89 U/L / ALT: 11 U/L / AST: 11 U/L / GGT: x           RADIOLOGY & ADDITIONAL STUDIES:  ACC: [id number] EXAM:  CT ABDOMEN AND PELVIS   ORDERED BY: TIMOTEO BORREGO   PROCEDURE DATE:  10/08/2024    INTERPRETATION:  CLINICAL INFORMATION: Abdominal and left flank pain    COMPARISON: CT abdomen and pelvis 7/15/2024    CONTRAST/COMPLICATIONS:  IV Contrast: NONE  Oral Contrast: NONE  Complications: None reported at time of study completion    PROCEDURE:  CT of the Abdomen and Pelvis was performed.  Sagittal and coronal reformats were performed.    FINDINGS:  LOWER CHEST: Small bilateral pleural effusions and bibasilar atelectasis.   Calcified right pleural plaque.    LIVER: Normal.  BILE DUCTS: Nondilated.  GALLBLADDER: Distended with multiple small layering stones.  SPLEEN: Normal.  PANCREAS: Normal.  ADRENALS: Normal.  KIDNEYS/URETERS: No hydronephrosis or urinary tract calculi. Enlarged   edematous left kidney.    BLADDER: Normal.  REPRODUCTIVE ORGANS: No masses.    BOWEL: No bowel-related abnormality. No bowel obstruction or bowel   inflammation. Normal ileocecal region. No evidence of active colitis or   diverticulitis.  PERITONEUM: No free air or ascites.  VESSELS: Aortoiliac atherosclerosis without aneurysm.  RETROPERITONEUM/LYMPH NODES: No adenopathy.  ABDOMINAL WALL: Normal.  BONES: No acute bony abnormality.    IMPRESSION:  *  No hydronephrosis or urinary tract calculi.  *  Enlarged edematous left kidney can be seen in the setting of recently   passed stone versus pyelonephritis.    --- End of Report ---    SHWETA CYR MD; Attending Radiologist  This document has been electronically signed. Oct  8 2024  6:54PM  10-08-24 @ 18:42     Chief Complaint:  Patient is a 74y old  Female who presents with a chief complaint of Shortness of breath (09 Oct 2024 11:26)    HPI:  75 y/o F w/ PMHx CAD s/p PCI, PAD with chronic RLE wound s/p debridement on 07/09, HFpEF, HTN, cardiac pacemaker for Mobitz type II, CVA, and UT who presents w/ complaint of shortness of breath. Five days ago, pt saw private cardiologist, Dr. Allen, was instructed to stop taking her "water pill" due to elevated potassium levels. She was supposed to stop taking spironolactone, but accidently also stopped taking Lasix. Patient endorses shortness of breath with ambulation, associated, cough, mild reproducible chest pain, and left flank pain. She also endorses shakiness when she walks. Patient has not been taking her Lasix and ranolazine. In ER, she was found to have elevated BNP and K, for which she was given insulin cocktail. UA was also done and showed large leuk esterase, and hemogram showed elevated white count with left shift and anemia. Patient was consulted to GI for anemia workup. Pt states anemia is chronic. Denies abdominal pain, diarrhea, black or bloody stool.  Had colonoscopy 10 years ago. Denies any family hx of GI malignancy.  No abdominal pain , no constipation , no diarrhea, no weight loss, no melena, no rectal bleeding , no heartburn    : [id number] used, beside nurse also helped to translate.     PAST MEDICAL & SURGICAL HISTORY:  DM (diabetes mellitus)  HTN (hypertension)  H/O gastroesophageal reflux (GERD)  Cardiac pacemaker  MICRA for mobitz type 11  CVA (cerebrovascular accident)  resdiual right sided weakness  CVA (cerebrovascular accident)  PAD (peripheral artery disease)  Wound of right leg  History of benign brain tumor  Cataract  History of biopsy  Cardiac pacemaker  S/P angiogram of extremity    REVIEW OF SYSTEMS:   General: Negative  HEENT: Negative  CV: Negative  Respiratory: +SOB  GI: See HPI  : Negative  MSK: Negative  Hematologic: Negative  Skin: Negative    MEDICATIONS:   MEDICATIONS  (STANDING):  aspirin  chewable 81 milliGRAM(s) Oral daily  atorvastatin 80 milliGRAM(s) Oral at bedtime  clopidogrel Tablet 75 milliGRAM(s) Oral daily  dextrose 5%. 1000 milliLiter(s) (100 mL/Hr) IV Continuous <Continuous>  ferrous    sulfate 325 milliGRAM(s) Oral three times a day  furosemide   Injectable 40 milliGRAM(s) IV Push two times a day  gabapentin 800 milliGRAM(s) Oral Once  glucagon  Injectable 1 milliGRAM(s) IntraMuscular once  heparin   Injectable 5000 Unit(s) SubCutaneous every 12 hours  insulin lispro (ADMELOG) corrective regimen sliding scale   SubCutaneous three times a day before meals  isosorbide   mononitrate ER Tablet (IMDUR) 30 milliGRAM(s) Oral daily  metoprolol succinate  milliGRAM(s) Oral daily  pantoprazole    Tablet 20 milliGRAM(s) Oral before breakfast  ranolazine 500 milliGRAM(s) Oral two times a day    MEDICATIONS  (PRN):  dextrose Oral Gel 15 Gram(s) Oral once PRN Blood Glucose LESS THAN 70 milliGRAM(s)/deciliter  melatonin 3 milliGRAM(s) Oral at bedtime PRN Insomnia  metoprolol tartrate Injectable 2.5 milliGRAM(s) IV Push every 6 hours PRN SBP > 160    DIET:  Diet, Regular:   Consistent Carbohydrate No Snacks (CSTCHO) (10-08-24 @ 16:57) [Active]    ALLERGIES:   Allergies  No Known Allergies  Intolerances    Substance Use:   (  ) never used  (  ) other:  Tobacco Usage:  (   ) never smoked   (   ) former smoker   (   ) current smoker  (     ) pack year  (        ) last cigarette date  Alcohol Usage:    Family History   IBD (  ) Yes   (  ) No  GI Malignancy (  )  Yes    ( x ) No    Health Management  Last Colonoscopy: 10 years ago  Last Endoscopy: n/a    VITAL SIGNS:   Vital Signs Last 24 Hrs  T(C): 36.4 (09 Oct 2024 11:15), Max: 36.7 (09 Oct 2024 04:16)  T(F): 97.6 (09 Oct 2024 11:15), Max: 98 (09 Oct 2024 04:16)  HR: 83 (09 Oct 2024 12:37) (59 - 83)  BP: 155/67 (09 Oct 2024 12:37) (135/70 - 174/76)  BP(mean): 89 (08 Oct 2024 13:46) (89 - 89)  RR: 18 (09 Oct 2024 11:15) (18 - 26)  SpO2: 96% (09 Oct 2024 11:15) (96% - 100%)    Parameters below as of 09 Oct 2024 11:15  Patient On (Oxygen Delivery Method): nasal cannula    I&O's Summary    PHYSICAL EXAM:   GENERAL:  No acute distress  HEENT:  NC/AT, conjunctiva clear, sclera anicteric  CHEST:  increased work of breathing, on 2 L NC, R>L crackles in b/l lung bases  HEART:  Regular rate  ABDOMEN:  Soft, non-tender, non-distended, no rebound or guarding; rectal exam shows dark brown stool   EXTREMITIES: No edema  SKIN:  Warm, dry  NEURO:  Calm, cooperative  rectal- brown stool     LABS:                        8.1    13.02 )-----------( 590      ( 09 Oct 2024 04:43 )             26.0     Hemoglobin: 8.1 g/dL (10-09-24 @ 04:43)  Hemoglobin: 8.4 g/dL (10-08-24 @ 11:18)    10-09    130[L]  |  95[L]  |  38.1[H]  ----------------------------<  82  4.7   |  21.0[L]  |  1.52[H]    Ca    8.8      09 Oct 2024 04:43  Phos  3.5     10-09  Mg     2.2     10-09    TPro  7.7  /  Alb  3.8  /  TBili  0.3[L]  /  DBili  x   /  AST  11  /  ALT  11  /  AlkPhos  89  10-09    LIVER FUNCTIONS - ( 09 Oct 2024 04:43 )  Alb: 3.8 g/dL / Pro: 7.7 g/dL / ALK PHOS: 89 U/L / ALT: 11 U/L / AST: 11 U/L / GGT: x           RADIOLOGY & ADDITIONAL STUDIES:  ACC: [id number] EXAM:  CT ABDOMEN AND PELVIS   ORDERED BY: TIMOTEO BORREGO   PROCEDURE DATE:  10/08/2024    INTERPRETATION:  CLINICAL INFORMATION: Abdominal and left flank pain    COMPARISON: CT abdomen and pelvis 7/15/2024    CONTRAST/COMPLICATIONS:  IV Contrast: NONE  Oral Contrast: NONE  Complications: None reported at time of study completion    PROCEDURE:  CT of the Abdomen and Pelvis was performed.  Sagittal and coronal reformats were performed.    FINDINGS:  LOWER CHEST: Small bilateral pleural effusions and bibasilar atelectasis.   Calcified right pleural plaque.    LIVER: Normal.  BILE DUCTS: Nondilated.  GALLBLADDER: Distended with multiple small layering stones.  SPLEEN: Normal.  PANCREAS: Normal.  ADRENALS: Normal.  KIDNEYS/URETERS: No hydronephrosis or urinary tract calculi. Enlarged   edematous left kidney.    BLADDER: Normal.  REPRODUCTIVE ORGANS: No masses.    BOWEL: No bowel-related abnormality. No bowel obstruction or bowel   inflammation. Normal ileocecal region. No evidence of active colitis or   diverticulitis.  PERITONEUM: No free air or ascites.  VESSELS: Aortoiliac atherosclerosis without aneurysm.  RETROPERITONEUM/LYMPH NODES: No adenopathy.  ABDOMINAL WALL: Normal.  BONES: No acute bony abnormality.    IMPRESSION:  *  No hydronephrosis or urinary tract calculi.  *  Enlarged edematous left kidney can be seen in the setting of recently   passed stone versus pyelonephritis.    --- End of Report ---    SHWETA CYR MD; Attending Radiologist  This document has been electronically signed. Oct  8 2024  6:54PM  10-08-24 @ 18:42

## 2024-10-09 NOTE — CONSULT NOTE ADULT - SUBJECTIVE AND OBJECTIVE BOX
[facility]                                              CARDIOLOGY AT [place] [address]                                             Telephone: [phone]. Fax:[phone]                                                       CARDIOLOGY CONSULTATION NOTE                                                                                             History obtained by: Patient and medical record  Community Cardiologist: Dr Allen    obtained: Yes [  ] No [x  ]  Reason for Consultation: shortness of breath  Available out pt records reviewed: Yes [x ] No [  ]    Chief complaint:    Patient is a 74y old  Female who presents with a chief complaint of Shortness of breath (09 Oct 2024 11:26)      HPI:  Patient is a 74 year old female who presents to the emergency room from home with complaints of shortness of breath. She has a past medical history significant for CAD s/p PCI, PAD with chronic RLE wound s/p debridement on 07/09, HFpEF, HTN, cardiac pacemaker for Mobitz type II, CVA, and UTI. Patient was recently seen by her private cardiologist, Dr. Allen 5 days ago and was instructed to stop taking her "water pill" due to elevated potassium levels. She was supposed to stop taking spironolactone, but accidently also stopped taking Lasix. Patient endorses shortness of breath with ambulation, associated, cough, mild reproducible chest pain, and left flank pain. She also endorses shakiness when she walks. Patient has not been taking her Lasix and ranolazine. In the ER, she was found to have elevated BNP and K, for which she was given insulin cocktail. UA was also done and showed large leuk esterase, and hemogram showed elevated white count with left shift and anemia. Patient is to be admitted to the medical service for further work up and diuresis.     Pt was examined at the bedside.  Patient is saturating well on RA, reports feeling better. Family was at the bedside, spoke with the family and the patient about the current treatment plan. Explained the findings of current TTE LVEF 47% which shows worsening function of the heart. Discussed the risks of undergoing with the cardiac cath at this point in the setting of the BRADY (current CR 1.5 elevated) vs optimizing medical management and compliance. Family and the patient agreed with the medical management only at this point. Will follow with Dr Allen outpatient (Next appointment in November 2024). Also discussed the importance of the pt following up with nephrology in the setting of the BRADY and with GI for colonoscopy in the setting of current anemia. Patient and the famil agreed with plan.       CARDIAC TESTING   ECHO: 10/8 LVEF 47%    ELECTROPHYSIOLOGY: [facility] 2022    PAST MEDICAL HISTORY  DM (diabetes mellitus)    HTN (hypertension)    Acute otitis media, unspecified otitis media type    H/O gastroesophageal reflux (GERD)    Cardiac pacemaker    CVA (cerebrovascular accident)    CVA (cerebrovascular accident)    PAD (peripheral artery disease)    Wound of right leg        PAST SURGICAL HISTORY  History of benign brain tumor    Cataract    History of biopsy    Cardiac pacemaker    S/P angiogram of extremity        SOCIAL HISTORY:  Denies smoking/alcohol/drugs  CIGARETTES:   denies  ALCOHOL: denies  DRUGS: denies    FAMILY HISTORY:  FH: asthma  Son    Sudden Cardiac Death in First degree relative: Yes [  ] No [ x ]    HOME MEDICATIONS:  amLODIPine 10 mg oral tablet: 1 tab(s) orally once a day (16 Jul 2024 03:16)  clopidogrel 75 mg oral tablet: 1 tab(s) orally once a day (16 Jul 2024 03:16)  ferrous sulfate 325 mg (65 mg elemental iron) oral tablet: 1 tab(s) orally 3 times a day (16 Jul 2024 03:16)  insulin glargine 100 units/mL subcutaneous solution: 30 unit(s) subcutaneous once a day (at bedtime) (16 Jul 2024 03:16)  isosorbide mononitrate 30 mg oral tablet, extended release: 1 tab(s) orally once a day (16 Jul 2024 03:16)  pantoprazole 40 mg oral delayed release tablet: 1 tab(s) orally once a day (16 Jul 2024 03:16)    patient brought her medications with her to ED:   Valsartan 160 mg x1, Gabapentin 800 mg x 1 day, Amlodipine 10 mg 1 day, clopidogrel 75 mg x 1 day, isosorbide mononitrate 30 mg ER x 1 day, metoprolol succinate 100 mg x1 day, pantoprazole 20 mg x 1 day, atorvastatin 80 mg x 1 day, metformin 1,000 mg x 2 day, and aspirin 81 mg x 1 day,  furosemide 40 mg x1 day and ranolazine 500 mg x every 12 hours       CURRENT CARDIAC MEDICATIONS:  furosemide   Injectable 40 milliGRAM(s) IV Push two times a day  isosorbide   mononitrate ER Tablet (IMDUR) 30 milliGRAM(s) Oral daily  metoprolol succinate  milliGRAM(s) Oral daily  metoprolol tartrate Injectable 2.5 milliGRAM(s) IV Push every 6 hours PRN SBP > 160  ranolazine 500 milliGRAM(s) Oral two times a day      CURRENT OTHER MEDICATIONS:  gabapentin 800 milliGRAM(s) Oral Once, Stop order after: 1 Doses  melatonin 3 milliGRAM(s) Oral at bedtime PRN Insomnia  pantoprazole    Tablet 20 milliGRAM(s) Oral before breakfast  aspirin  chewable 81 milliGRAM(s) Oral daily  atorvastatin 80 milliGRAM(s) Oral at bedtime  clopidogrel Tablet 75 milliGRAM(s) Oral daily  dextrose 5%. 1000 milliLiter(s) (100 mL/Hr) IV Continuous <Continuous>  dextrose Oral Gel 15 Gram(s) Oral once, Stop order after: 1 Doses PRN Blood Glucose LESS THAN 70 milliGRAM(s)/deciliter  ferrous    sulfate 325 milliGRAM(s) Oral three times a day  glucagon  Injectable 1 milliGRAM(s) IntraMuscular once, Stop order after: 1 Doses  heparin   Injectable 5000 Unit(s) SubCutaneous every 12 hours  insulin lispro (ADMELOG) corrective regimen sliding scale   SubCutaneous three times a day before meals      ALLERGIES:   No Known Allergies      REVIEW OF SYMPTOMS:   CONSTITUTIONAL: No fever, no chills, no weight loss, no weight gain, no fatigue   ENMT:  No vertigo; No sinus or throat pain  NECK: No pain or stiffness  CARDIOVASCULAR: No chest pain, + dyspnea, no syncope/presyncope, no palpitations, no dizziness, no Orthopnea, no Paroxsymal nocturnal dyspnea  RESPIRATORY: + Shortness of breath,  + cough, no wheezing  : No dysuria, no hematuria   GI: No dark color stool, no nausea, no diarrhea, no constipation, no abdominal pain   NEURO: No headache, no slurred speech   MUSCULOSKELETAL: No joint pain or swelling; No muscle, back, or extremity pain  PSYCH: No agitation, no anxiety.    ALL OTHER REVIEW OF SYSTEMS ARE NEGATIVE.    VITAL SIGNS:  T(C): 36.4 (10-09-24 @ 11:15), Max: 36.7 (10-09-24 @ 04:16)  T(F): 97.6 (10-09-24 @ 11:15), Max: 98 (10-09-24 @ 04:16)  HR: 83 (10-09-24 @ 12:37) (59 - 83)  BP: 155/67 (10-09-24 @ 12:37) (147/66 - 174/76)  RR: 18 (10-09-24 @ 11:15) (18 - 18)  SpO2: 96% (10-09-24 @ 11:15) (96% - 100%)    INTAKE AND OUTPUT:       PHYSICAL EXAM:  Constitutional: Comfortable . No acute distress.   HEENT: Atraumatic and normocephalic , neck is supple . no JVD. No carotid bruit.  CNS: A&Ox3. No focal deficits.   Respiratory: CTAB, unlabored, on RA    Cardiovascular: RRR normal s1 s2. No murmur. No rubs or gallop.  Gastrointestinal: Soft, non-tender. +Bowel sounds.   Extremities: 2+ Peripheral Pulses, No clubbing, cyanosis, or edema  Psychiatric: Calm . no agitation.   Skin: Warm and dry, no ulcers on extremities     LABS:                            8.1    13.02 )-----------( 590      ( 09 Oct 2024 04:43 )             26.0     10-09    130[L]  |  95[L]  |  38.1[H]  ----------------------------<  82  4.7   |  21.0[L]  |  1.52[H]    Ca    8.8      09 Oct 2024 04:43  Phos  3.5     10-09  Mg     2.2     10-09    TPro  7.7  /  Alb  3.8  /  TBili  0.3[L]  /  DBili  x   /  AST  11  /  ALT  11  /  AlkPhos  89  10-09      Urinalysis Basic - ( 09 Oct 2024 04:43 )    Color: x / Appearance: x / SG: x / pH: x  Gluc: 82 mg/dL / Ketone: x  / Bili: x / Urobili: x   Blood: x / Protein: x / Nitrite: x   Leuk Esterase: x / RBC: x / WBC x   Sq Epi: x / Non Sq Epi: x / Bacteria: x              INTERPRETATION OF TELEMETRY:     ECG:   Prior ECG: Yes [ x ] No [  ]    RADIOLOGY & ADDITIONAL STUDIES:    X-ray:  10/8 Mild central CHF at this time

## 2024-10-09 NOTE — CONSULT NOTE ADULT - ASSESSMENT
HPI:  Pt is a 73yoF hx CAD s/p PCI, PAD with chronic RLE wound s/p debridement on 7/9, HFpEF, HTN, s/p PPM, CVA, and UTI currently on 5/7 of  bactrim presenting w/ SOB x 3 days.    #Acute on Chronic Respiratory Failure with Hypoxia   likely in the setting of acute on chronic HFpEF exacerbation   - pt non compliant with furosemide at home  - c/w Furosemide 40 mg BID  - Cr 1.41 --> today Cr 1.52 worsening (Baseline serum creatinine 0.9-1 mg/deciliter)  - nephrology reqs appreciated:  hold valsartan, discontinue Bactrim, not giving IV fluids in setting of HFpEF decompensation  - TTE  10/8 47%   - discussed with the patient the risks of undergoing cardiac cath in the setting of the worsening kidney function, family and the patient deferred cardiac cath right now and chose to follow the current medical plan.   - EKG normal   - pBNP [id number]  - Troponin x2 negative   - Low Sodium/DASH diet   - Pt to follow with Dr Allen in the outpatient office (next appointment November)  - Pt to follow up with nephrology outpatient for worsening kidney function    #Hyperkalemia  - pt K 6.0, outpatient K 5.9 --> 4.7 normalized  - spironolactone DCed in the outpatient office on 10/7  - monitor K      #HTN  - D/c Valsartan 160 mg x 1 day  - c/w ranolazine 500 mg Q12 hrs  - c/w amlodipine 10 mg x 1 day  - c/w isosorbide mononitrate 30 mg x1 day  - c/w metoprolol succinate 100 mg x 1 day    #HDL  - c/w atorvastatin 80 mg x 1 day     #Microcytic anemia, likely secondary to iron deficiency  - Continue ferrous sulfate  - GI consulted per medical team  - Recommend outpatient colonoscopy

## 2024-10-09 NOTE — CONSULT NOTE ADULT - SUBJECTIVE AND OBJECTIVE BOX
History of present illness: Patient is a 73-year-old female with past medical history significant for hypertension, CAD, PAD who presented to ER with complaints of shortness of breath.  Patient states that she stopped taking her Lasix recently.  She was found to have hyperkalemia and was told by the cardiologist to stop spironolactone but patient got confused and stopped taking Lasix.  She was also taking Bactrim day 5/7 for UTI.  Labs on presentation significant for a sodium of 124, potassium 5.7 and serum creatinine of 1.49 up from a baseline of closer to 1 for which nephrology was consulted.  Patient received medical treatment for hyperkalemia and was started on IV Lasix.  Labs this morning with improvement in sodium to 130 and potassium to 4.7.  Denies dysuria, gross hematuria, chest pain, dizziness, palpitation, diarrhea, fever/chills.    Review of systems: All systems were reviewed in detail, pertinent positive and negative have been mentioned above, otherwise negative.  Past medical and surgical history: HTN, DM, otitis media, GERD, CVA, PAD, chronic wound of right leg, cataract removal, cardiac pacemaker placement, s/p angiogram of extremity.  Allergies: NKDA  Medications: Amlodipine 10 mg daily, clopidogrel 75 mg daily, ferrous sulfate 325 mg 1 tab 3 times daily, Imdur 30 mg 1 tablet daily, pantoprazole 40 mg 1 tablet daily.  Family history: No pertinent family history of renal disease or electrolyte disorder in first degree relatives.  Social history: Denies tobacco, alcohol, drug abuse.     Physical Exam:  Gen: no acute distress  MS: alert, conversing normally  Eyes: EOMI, no icterus  HENT: NCAT, MMM, NC+  CV: rhythm reg reg, rate normal, no m/g/r, no LE edema  Chest: CTAB, no w/r/r,  Abd: soft, NT, ND  Neuro: moving all 4 limbs spontaneously, no tremor  MSK: Dressing RLE+  Skin: dry, warm, no rash or jaundice    Vital Signs Last 24 Hrs  T(C): 36.4 (09 Oct 2024 07:31), Max: 37.2 (08 Oct 2024 13:03)  T(F): 97.6 (09 Oct 2024 07:31), Max: 99 (08 Oct 2024 13:03)  HR: 66 (09 Oct 2024 07:31) (59 - 83)  BP: 147/66 (09 Oct 2024 07:31) (135/70 - 175/76)  BP(mean): 89 (08 Oct 2024 13:46) (89 - 89)  RR: 18 (09 Oct 2024 07:31) (18 - 28)  SpO2: 99% (09 Oct 2024 07:31) (88% - 100%)  Parameters below as of 09 Oct 2024 07:31  Patient On (Oxygen Delivery Method): nasal cannula    Current medications:  amLODIPine   Tablet 10 milliGRAM(s) Oral once  aspirin  chewable 81 milliGRAM(s) Oral daily  atorvastatin 80 milliGRAM(s) Oral at bedtime  clopidogrel Tablet 75 milliGRAM(s) Oral daily  dextrose 5%. 1000 milliLiter(s) IV Continuous <Continuous>  ferrous    sulfate 325 milliGRAM(s) Oral three times a day  furosemide   Injectable 40 milliGRAM(s) IV Push two times a day  gabapentin 800 milliGRAM(s) Oral Once  glucagon  Injectable 1 milliGRAM(s) IntraMuscular once  heparin   Injectable 5000 Unit(s) SubCutaneous every 12 hours  insulin lispro (ADMELOG) corrective regimen sliding scale   SubCutaneous three times a day before meals  isosorbide   mononitrate ER Tablet (IMDUR) 30 milliGRAM(s) Oral daily  metoprolol succinate  milliGRAM(s) Oral daily  pantoprazole    Tablet 20 milliGRAM(s) Oral before breakfast  ranolazine 500 milliGRAM(s) Oral two times a day  valsartan 160 milliGRAM(s) Oral daily    10-09    130[L]  |  95[L]  |  38.1[H]  ----------------------------<  82  4.7   |  21.0[L]  |  1.52[H]    Ca    8.8      09 Oct 2024 04:43  Phos  3.5     10-09  Mg     2.2     10-09    TPro  7.7  /  Alb  3.8  /  TBili  0.3[L]  /  DBili  x   /  AST  11  /  ALT  11  /  AlkPhos  89  10-09    Creatinine: 1.52 mg/dL (10-09-24 @ 04:43)  Creatinine: 1.49 mg/dL (10-08-24 @ 16:22)  Creatinine: 1.41 mg/dL (10-08-24 @ 11:18)                          8.1    13.02 )-----------( 590      ( 09 Oct 2024 04:43 )             26.0   Imaging reviewed

## 2024-10-09 NOTE — CONSULT NOTE ADULT - ASSESSMENT
73 YOF with HTN, CAD, PAD and HFpEF and UTI currently on Bactrim presenting with shortness of breath for 3 days in setting of discontinuing Lasix.  Nephrology consulted for BRADY    Acute kidney injury:  ·	Baseline serum creatinine 0.9-1 mg/deciliter  ·	Serum creatinine elevated today at at 1.52 mg/deciliter  ·	BRADY likely related to Bactrim use along with concomitant use of valsartan, possible?  CRS  ·	UA reviewed cloudy, large leukocyte esterase and pyuria  ·	Recommend to hold valsartan, discontinue Bactrim  ·	Agree with not giving IV fluids in setting of HFpEF decompensation  ·	Expecting serum creatinine to slowly improve with above measures.    Hyperkalemia: BRADY, Bactrim, valsartan.  Hold valsartan, discontinue Bactrim.  Potassium has improved post medical management in ER.  Level is 4.7 today, will follow.    Hyponatremia, hypervolemic: Improved post IV Lasix.  On continue on fluid restriction of less than 2 L/day.    Hypertension: Continue with amlodipine 10 mg daily, Imdur 30 mg daily, metoprolol succinate 100 mg daily.  As above hold valsartan.    Anemia, unspecified: c/w ferrous sulphate 325 mg po TID.     Discussed with Dr. Hightower. 73 YOF with HTN, CAD, PAD and HFpEF and UTI currently on Bactrim presenting with shortness of breath for 3 days in setting of discontinuing Lasix.  Nephrology consulted for BRADY    Acute kidney injury:  ·	Baseline serum creatinine 0.9-1 mg/deciliter  ·	Serum creatinine elevated today at at 1.52 mg/deciliter  ·	BRADY likely related to Bactrim use along with concomitant use of valsartan, possible?  CRS  ·	UA reviewed cloudy, large leukocyte esterase and pyuria  ·	Recommend to hold valsartan, discontinue Bactrim  ·	Agree with not giving IV fluids in setting of HFpEF decompensation  ·	Expecting serum creatinine to slowly improve with above measures.    Hyperkalemia: BRADY, Bactrim, valsartan.  Hold valsartan, discontinue Bactrim.  Potassium has improved post medical management in ER.  Level is 4.7 today, will follow.    Hyponatremia, hypervolemic: Improved post IV Lasix.  On continue on fluid restriction of less than 2 L/day.    Volume status: placed on Lasix IV BID. I&O's and fluid restriction.    Hypertension: Continue with amlodipine 10 mg daily, Imdur 30 mg daily, metoprolol succinate 100 mg daily.  As above hold valsartan.    Anemia, unspecified: c/w ferrous sulphate 325 mg po TID.     Discussed with Dr. Hightower.

## 2024-10-09 NOTE — CONSULT NOTE ADULT - ASSESSMENT
73 y/o F w/ PMHx CAD s/p PCI, PAD with chronic RLE wound s/p debridement on 07/09, HFpEF, HTN, cardiac pacemaker for Mobitz type II, CVA, and UT who presents w/ complaint of shortness of breath. Five days ago, pt saw cardiologist, Dr. Allen, was instructed to stop taking her "water pill" due to elevated potassium levels. She was supposed to stop taking spironolactone, but accidently also stopped taking Lasix. In ER, she was found to have elevated BNP and K, for which she was given insulin cocktail. UA was also done and showed large leuk esterase, and hemogram showed elevated white count with left shift and anemia. GI was consulted for anemia workup. Pt states anemia is chronic. Denies abdominal pain, diarrhea, black or bloody stool.  Had colonoscopy 10 years ago. Denies any family hx of GI malignancy. CT a/p shows no bowel pathology. Anemia likely secondary to pt's chronic CKD, worsened by acute on chronic CKD with elevated Cr. Volume overloaded, can be dilutional anemia as well.     #anemia  #CKD  #HFpEF  - Pt w/ chronic anemia HgB around 9-10, now presents with HgB of 8.1.   - Pt now has acute on chronic CKD with elevated Cr. Chronic anemia likely secondary to CKD.   - Pt with hx of HFpEF, pt stopped her diuretic, spironolactone and now in volume overload with elevated BNP and crackles on lung ausculation. Anemia can be dilutional, worsening chronic anemia.   - Transfused HgB to maintain >7.  - Pt last took Plavix today, has since been discontinued.   - Last colonoscopy 10 years ago, can do colonoscopy to assess for any bleeding. Plavix must be discontinued for at least five days. Can do on Monday inpatient otherwise can follow up outpatient.  75 y/o F w/ PMHx CAD s/p PCI, PAD with chronic RLE wound s/p debridement on 07/09, HFpEF, HTN, cardiac pacemaker for Mobitz type II, CVA, and UT who presents w/ complaint of shortness of breath. Five days ago was told to stop her "water pil", accidently stopped both Lasix and spironolactone. In ER, she was found to have elevated BNP. CBC shows anemia, GI was consulted for anemia workup. Pt states anemia is chronic. Denies abdominal pain, diarrhea, black or bloody stool.  Had colonoscopy 10 years ago. Denies any family hx of GI malignancy. CT a/p shows no bowel pathology, rectal exam shows dark brown stool. Anemia likely secondary to pt's chronic CKD, worsened by acute on chronic CKD with elevated Cr. Volume overloaded, can be dilutional anemia as well.     #anemia  #CKD  #HFpEF  - Pt w/ chronic anemia HgB around 9-10, now presents with HgB of 8.1.   - Pt now has acute on chronic CKD with elevated Cr. Chronic anemia likely secondary to CKD.   - Pt with hx of HFpEF, pt stopped her diuretic, spironolactone and now in volume overload with elevated BNP and crackles on lung ausculation. Anemia can be dilutional, worsening chronic anemia.   - Transfused HgB to maintain >8.  - Pt last took Plavix today, has since been discontinued.   - Last colonoscopy 10 years ago, can do colonoscopy to assess for any bleeding. Plavix must be discontinued for at least five days. Can do on Monday inpatient otherwise can follow up outpatient.  75 y/o F w/ PMHx CAD s/p PCI, PAD with chronic RLE wound s/p debridement on 07/09, HFpEF, HTN, cardiac pacemaker for Mobitz type II, CVA, and UT who presents w/ complaint of shortness of breath. Five days ago was told to stop her "water pil", accidently stopped both Lasix and spironolactone. In ER, she was found to have elevated BNP. CBC shows anemia, GI was consulted for anemia workup. Pt states anemia is chronic. Denies abdominal pain, diarrhea, black or bloody stool.  Had colonoscopy 10 years ago. Denies any family hx of GI malignancy. CT a/p shows no bowel pathology, rectal exam shows dark brown stool. Anemia likely secondary to pt's chronic CKD, worsened by acute on chronic CKD with elevated Cr. Volume overloaded, can be dilutional anemia as well.     #anemia  #CKD  #HFpEF  - Pt w/ chronic anemia HgB around 9-10, now presents with HgB of 8.1.   - Pt now has acute on chronic CKD with elevated Cr. Chronic anemia likely secondary to CKD.   - Pt with hx of HFpEF, pt stopped her diuretic, spironolactone and now in volume overload with elevated BNP and crackles on lung ausculation. Anemia can be dilutional, worsening chronic anemia.   - Transfused HgB to maintain >8.  - Pt last took Plavix today, has since been discontinued.   - Last colonoscopy 10 years ago, can do colonoscopy to assess for any bleeding. Plavix must be discontinued for at least five days. Can do on  Tuesday  inpatient otherwise can follow up outpatient.

## 2024-10-09 NOTE — CONSULT NOTE ADULT - PROBLEM SELECTOR RECOMMENDATION 9
Patient has no overt rectal bleeding- rectal brown stool   Patient with normocytic anemia - I have ordered iron studies. Likely anemia of chronic disease in setting of CRI. May have slightly lower hemoglobin due to dilution ( stopped her diuretics ) .   hemoglobin 8.1.  cbc ordered for tomorrow   CT - my interpretation - no colon lesions   Patient will need cardiac clearance for eventual colonoscopy ( pt would need to stop plavix and needs clearance secondary to CHF )  . Pt had plavix today. Earliest it would be done on Tuesday as in patient. Otherwise ok to see me in office for outpatient colonoscopy

## 2024-10-09 NOTE — PROGRESS NOTE ADULT - ASSESSMENT
Patient is a 74 year old female with a past medical history of CAD s/p PCI, PAD with chronic RLE wound s/p debridement on 07/09, HFpEF, HTN, cardiac pacemaker for Mobitz type II, CVA who is admitted due to CHF exacerbation and need for intravenous diuresis.    Acute hypoxic respiratory failure secondary to fluid overload  Acute on chronic HFpEF exacerbation secondary to medication noncompliance  History of CVA, CAD s/p PCI, HTN, HLD  - Patient not complaint with Lasix at home; Crackles auscultated  - Requiring 2L NC at this time  - proBNP elevated at 16k, creatinine elevated at 1.41 (above baseline)  - EKG without any acute changes, troponin negative x2  - CXR showing pulmonary vascular congestion  - Cardiology is following the case    Plan:  - Continue Lasix 40mg IV Q12H  - Continue aspirin 81mg daily and Plavix 75mg daily  - Continue metoprolol succinate 100mg daily  - Continue isosorbide mononitrate 30mg daily  - Continue amlodipine 10mg daily  - Continue ranolazine 500mg Q12H  - Continue atorvastatin 80mg HS    Acute kidney injury  Hyperkalemia  Hyponatremia, likely hypervolemic in the setting of CHF  - K elevated at 6.0; It was 5.9 as outpatient  - Spironolactone is being held; Valsartan and Bactrim also being held  - S/p insulin and D50 in the emergency room  - Nephrology is following the case    Urinary tract infection  Rule out pyelonephritis  - UA showing large leukocyte esterases  - WBC elevated at 17.93 on admission, now downtrending  - Patient was taking Macrobid as outpatient for two days  - Patient endorses left flank pain; CTAP showing pyelo vs passed stone in L kidney  - Continue ceftriaxone 1g Q24H    Chronic RLE wound s/p debridement  - Not complaining of worsening symptoms at this time  - Follow up wound care consult   - Consider ID evaluation if white count does not resolve    Diabetes mellitus, type II  - Hold home metformin at this time  - Started on insulin sliding scale  - Monitor POCT glucose and adjust regimen as indicated  - Continue gabapentin at home dose of 800mg    Microcytic anemia, likely secondary to iron deficiency  - Continue ferrous sulfate  - No need to do anemia work up at this time      DVT/GI ppx: Heparin subq, Protonix  Diet: Carb consistent diet  Code Status: Full code  Dispo: Home in 1-2 days after diuresis and creatinine resolution, pending Cardio/Nephro clearance Patient is a 74 year old female with a past medical history of CAD s/p PCI, PAD with chronic RLE wound s/p debridement on 07/09, HFpEF, HTN, cardiac pacemaker for Mobitz type II, CVA who is admitted due to CHF exacerbation and need for intravenous diuresis.    Acute hypoxic respiratory failure secondary to fluid overload  Acute on chronic HFpEF exacerbation secondary to medication noncompliance  History of CVA, CAD s/p PCI, HTN, HLD  - Patient not complaint with Lasix at home; Crackles auscultated  - Requiring 2L NC at this time  - proBNP elevated at 16k, creatinine elevated at 1.41 (above baseline)  - EKG without any acute changes, troponin negative x2  - CXR showing pulmonary vascular congestion  - Cardiology is following the case    Plan:  - Continue Lasix 40mg IV Q12H  - Continue aspirin 81mg daily and Plavix 75mg daily  - Continue metoprolol succinate 100mg daily  - Continue isosorbide mononitrate 30mg daily  - Continue amlodipine 10mg daily  - Continue ranolazine 500mg Q12H  - Continue atorvastatin 80mg HS    Acute kidney injury  Hyperkalemia  Hyponatremia, likely hypervolemic in the setting of CHF  - K elevated at 6.0; It was 5.9 as outpatient  - Spironolactone is being held; Valsartan and Bactrim also being held  - S/p insulin and D50 in the emergency room  - Nephrology is following the case    Urinary tract infection  Rule out pyelonephritis  - UA showing large leukocyte esterases  - WBC elevated at 17.93 on admission, now downtrending  - Patient was taking Macrobid as outpatient for two days  - Patient endorses left flank pain; CTAP showing pyelo vs passed stone in L kidney  - Continue ceftriaxone 1g Q24H    Chronic RLE wound s/p debridement  - Not complaining of worsening symptoms at this time  - Follow up wound care consult   - Consider ID evaluation if white count does not resolve    Diabetes mellitus, type II  - Hold home metformin at this time  - Started on insulin sliding scale  - Monitor POCT glucose and adjust regimen as indicated  - Continue gabapentin at home dose of 800mg    Microcytic anemia, likely secondary to iron deficiency  - Continue ferrous sulfate  - GI consulted today, as per Cardiology recommendations, case discussed at length with Dr. Allen      DVT/GI ppx: Heparin subq, Protonix  Diet: Carb consistent diet  Code Status: Full code  Dispo: Home in 1-2 days after diuresis and creatinine resolution, pending Cardio/Nephro clearance

## 2024-10-09 NOTE — CONSULT NOTE ADULT - NS ATTEND AMEND GEN_ALL_CORE FT
Patient seen and examined by me.  Chaperone: Rani SCHULTZ, PGY!  : Grand daughter    T(C): 36.4 (10-09-24 @ 11:15), Max: 36.7 (10-09-24 @ 04:16)  HR: 83 (10-09-24 @ 12:37) (59 - 83)  BP: 155/67 (10-09-24 @ 12:37) (147/66 - 174/76)  RR: 18 (10-09-24 @ 11:15) (18 - 18)  SpO2: 96% (10-09-24 @ 11:15) (96% - 100%)  Patient alert and awake.  Chest- Bilateral Clear BS  Cardiac- S1 and S2  Abdomen- Soft    Assessment/Plan:    Discussed with barron Elliott grand daughter who is a nursing student.  Cath discussed, Patient decided against Cath  Patient needs to f/u with GI for anemia, she has not had colonoscopy recently, needs f/u Nephrology  I have discussed my recommendation with the PA which are outlined above.  Will sign off
CHF exaccerbation., anemia. CRI    Patient has no overt rectal bleeding- rectal brown stool   Patient with normocytic anemia - I have ordered iron studies. Likely anemia of chronic disease in setting of CRI. May have slightly lower hemoglobin due to dilution ( stopped her diuretics ) .   hemoglobin 8.1.  cbc ordered for tomorrow   CT - my interpretation - no colon lesions   Patient will need cardiac clearance for eventual colonoscopy ( pt would need to stop plavix and needs clearance secondary to CHF )  . Pt had plavix today. Earliest it would be done on Tuesday as in patient. Otherwise ok to see me in office for outpatient colonoscopy. I explained the risks and benefits of colonoscopy including , but not limited to bleeding, infection, perforation  Above plan discussed with Dr Painting ( hospitalist ). - will discharge when CHF resolved and see me as outpatient     A- +Bs, soft, non tender  rectal- brown stool    GI to sign off
Patient seen and examined by me.    T(C): 37.2 (10-08-24 @ 13:03), Max: 37.2 (10-08-24 @ 13:03)  HR: 83 (10-08-24 @ 13:46) (66 - 83)  BP: 135/70 (10-08-24 @ 13:46) (135/70 - 175/76)  RR: 26 (10-08-24 @ 13:46) (20 - 28)  SpO2: 97% (10-08-24 @ 13:46) (88% - 97%)  Patient alert and awake.  Chest- Bilateral Clear BS  Cardiac- S1 and S2  Abdomen- Soft    Assessment/Plan:  1. Acute on Chronic CHF  I have discussed my recommendation with the PA which are outlined above.  Will follow.

## 2024-10-09 NOTE — CONSULT NOTE ADULT - NS ATTEST RISK PROBLEM GEN_ALL_CORE FT
CHF exaccerbation., anemia. CRI    Patient has no overt rectal bleeding- rectal brown stool   Patient with normocytic anemia - I have ordered iron studies. Likely anemia of chronic disease in setting of CRI. May have slightly lower hemoglobin due to dilution ( stopped her diuretics ) .   hemoglobin 8.1.  cbc ordered for tomorrow   CT - my interpretation - no colon lesions   Patient will need cardiac clearance for eventual colonoscopy ( pt would need to stop plavix and needs clearance secondary to CHF )  . Pt had plavix today. Earliest it would be done on Tuesday as in patient. Otherwise ok to see me in office for outpatient colonoscopy. I explained the risks and benefits of colonoscopy including , but not limited to bleeding, infection, perforation  Above plan discussed with Dr Painting ( hospitalist ). - will discharge when CHF resolved and see me as outpatient     A- +Bs, soft, non tender  rectal- brown stool

## 2024-10-10 LAB
ANION GAP SERPL CALC-SCNC: 15 MMOL/L — SIGNIFICANT CHANGE UP (ref 5–17)
BUN SERPL-MCNC: 38.2 MG/DL — HIGH (ref 8–20)
CALCIUM SERPL-MCNC: 9.3 MG/DL — SIGNIFICANT CHANGE UP (ref 8.4–10.5)
CHLORIDE SERPL-SCNC: 95 MMOL/L — LOW (ref 96–108)
CO2 SERPL-SCNC: 21 MMOL/L — LOW (ref 22–29)
CREAT SERPL-MCNC: 1.74 MG/DL — HIGH (ref 0.5–1.3)
EGFR: 31 ML/MIN/1.73M2 — LOW
ERYTHROCYTE [SEDIMENTATION RATE] IN BLOOD: 63 MM/HR — HIGH (ref 0–20)
GLUCOSE BLDC GLUCOMTR-MCNC: 167 MG/DL — HIGH (ref 70–99)
GLUCOSE BLDC GLUCOMTR-MCNC: 193 MG/DL — HIGH (ref 70–99)
GLUCOSE BLDC GLUCOMTR-MCNC: 215 MG/DL — HIGH (ref 70–99)
GLUCOSE BLDC GLUCOMTR-MCNC: 219 MG/DL — HIGH (ref 70–99)
GLUCOSE SERPL-MCNC: 183 MG/DL — HIGH (ref 70–99)
HCT VFR BLD CALC: 26.4 % — LOW (ref 34.5–45)
HGB BLD-MCNC: 8.5 G/DL — LOW (ref 11.5–15.5)
M PNEUMO IGM SER-ACNC: 0.25 INDEX — SIGNIFICANT CHANGE UP (ref 0–0.9)
MAGNESIUM SERPL-MCNC: 2.3 MG/DL — SIGNIFICANT CHANGE UP (ref 1.8–2.6)
MCHC RBC-ENTMCNC: 25.5 PG — LOW (ref 27–34)
MCHC RBC-ENTMCNC: 32.2 GM/DL — SIGNIFICANT CHANGE UP (ref 32–36)
MCV RBC AUTO: 79.3 FL — LOW (ref 80–100)
MYCOPLASMA AG SPEC QL: NEGATIVE — SIGNIFICANT CHANGE UP
PHOSPHATE SERPL-MCNC: 4.1 MG/DL — SIGNIFICANT CHANGE UP (ref 2.4–4.7)
PLATELET # BLD AUTO: 661 K/UL — HIGH (ref 150–400)
POTASSIUM SERPL-MCNC: 4.9 MMOL/L — SIGNIFICANT CHANGE UP (ref 3.5–5.3)
POTASSIUM SERPL-SCNC: 4.9 MMOL/L — SIGNIFICANT CHANGE UP (ref 3.5–5.3)
RBC # BLD: 3.33 M/UL — LOW (ref 3.8–5.2)
RBC # FLD: 16.4 % — HIGH (ref 10.3–14.5)
SODIUM SERPL-SCNC: 131 MMOL/L — LOW (ref 135–145)
WBC # BLD: 11.39 K/UL — HIGH (ref 3.8–10.5)
WBC # FLD AUTO: 11.39 K/UL — HIGH (ref 3.8–10.5)

## 2024-10-10 PROCEDURE — 99233 SBSQ HOSP IP/OBS HIGH 50: CPT

## 2024-10-10 RX ORDER — ACETAMINOPHEN 325 MG
650 TABLET ORAL ONCE
Refills: 0 | Status: COMPLETED | OUTPATIENT
Start: 2024-10-10 | End: 2024-10-10

## 2024-10-10 RX ADMIN — Medication 5000 UNIT(S): at 06:22

## 2024-10-10 RX ADMIN — RANOLAZINE 500 MILLIGRAM(S): 500 TABLET, EXTENDED RELEASE ORAL at 06:43

## 2024-10-10 RX ADMIN — Medication 325 MILLIGRAM(S): at 06:21

## 2024-10-10 RX ADMIN — Medication 100 MILLIGRAM(S): at 06:21

## 2024-10-10 RX ADMIN — Medication 4: at 12:36

## 2024-10-10 RX ADMIN — Medication 325 MILLIGRAM(S): at 13:08

## 2024-10-10 RX ADMIN — RANOLAZINE 500 MILLIGRAM(S): 500 TABLET, EXTENDED RELEASE ORAL at 17:06

## 2024-10-10 RX ADMIN — GABAPENTIN 800 MILLIGRAM(S): 800 TABLET, FILM COATED ORAL at 21:34

## 2024-10-10 RX ADMIN — Medication 4: at 17:06

## 2024-10-10 RX ADMIN — ATORVASTATIN CALCIUM 80 MILLIGRAM(S): 10 TABLET, FILM COATED ORAL at 21:30

## 2024-10-10 RX ADMIN — Medication 30 MILLIGRAM(S): at 08:58

## 2024-10-10 RX ADMIN — Medication 325 MILLIGRAM(S): at 21:30

## 2024-10-10 RX ADMIN — Medication 650 MILLIGRAM(S): at 21:29

## 2024-10-10 RX ADMIN — Medication 5000 UNIT(S): at 17:06

## 2024-10-10 RX ADMIN — PANTOPRAZOLE SODIUM 20 MILLIGRAM(S): 40 TABLET, DELAYED RELEASE ORAL at 06:42

## 2024-10-10 RX ADMIN — Medication 2: at 08:58

## 2024-10-10 RX ADMIN — FUROSEMIDE 40 MILLIGRAM(S): 10 INJECTION INTRAVENOUS at 06:21

## 2024-10-10 RX ADMIN — Medication 75 MILLIGRAM(S): at 08:58

## 2024-10-10 RX ADMIN — Medication 650 MILLIGRAM(S): at 22:12

## 2024-10-10 RX ADMIN — Medication 81 MILLIGRAM(S): at 08:58

## 2024-10-10 NOTE — PROGRESS NOTE ADULT - ASSESSMENT
Patient is a 74 year old female with a past medical history of CAD s/p PCI, PAD with chronic RLE wound s/p debridement on 07/09, HFpEF, HTN, cardiac pacemaker for Mobitz type II, CVA who is admitted due to CHF exacerbation and need for intravenous diuresis.    Acute hypoxic respiratory failure secondary to fluid overload  Acute on chronic HFpEF exacerbation secondary to medication noncompliance  History of CVA, CAD s/p PCI, HTN, HLD  - Patient not complaint with Lasix at home; Crackles auscultated  - Requiring 2L NC at this time  - proBNP elevated at 16k, creatinine elevated at 1.41 (above baseline)  - EKG without any acute changes, troponin negative x2  - CXR showing pulmonary vascular congestion  - Cardiology and Nephrology are following the case  - Held Lasix IV today due to uptrending creatinine    Plan:  - Continue aspirin 81mg daily and Plavix 75mg daily  - Continue metoprolol succinate 100mg daily  - Continue isosorbide mononitrate 30mg daily  - Continue amlodipine 10mg daily  - Continue ranolazine 500mg Q12H  - Continue atorvastatin 80mg HS    Acute kidney injury, worsening today  Hyperkalemia  Hyponatremia, likely hypervolemic in the setting of CHF  - K elevated at 6.0; It was 5.9 as outpatient  - Spironolactone is being held; Valsartan and Bactrim also being held  - S/p insulin and D50 in the emergency room  - Holding Lasix today  - Nephrology is following the case, agree with holding Lasix today    Urinary tract infection  Rule out pyelonephritis  - UA showing large leukocyte esterases  - WBC elevated at 17.93 on admission, now downtrending  - Patient was taking Macrobid as outpatient for two days  - Patient endorses left flank pain; CTAP showing pyelo vs passed stone in L kidney  - Continue ceftriaxone 1g Q24H    Chronic RLE wound s/p debridement  - Not complaining of worsening symptoms at this time  - Follow up wound care consult     Diabetes mellitus, type II  - Hold home metformin at this time  - Started on insulin sliding scale  - Monitor POCT glucose and adjust regimen as indicated  - Continue gabapentin at home dose of 800mg    Microcytic anemia, likely secondary to iron deficiency  - Continue ferrous sulfate  - GI consulted today, and recommend outpatient follow up for colonoscopy      DVT/GI ppx: Heparin subq, Protonix  Diet: Carb consistent diet  Code Status: Full code  Dispo: Home in 1-2 days after creatinine resolution, pending Cardio/Nephro clearance

## 2024-10-10 NOTE — DIETITIAN INITIAL EVALUATION ADULT - PERTINENT MEDS FT
MEDICATIONS  (STANDING):  aspirin  chewable 81 milliGRAM(s) Oral daily  atorvastatin 80 milliGRAM(s) Oral at bedtime  clopidogrel Tablet 75 milliGRAM(s) Oral daily  dextrose 5%. 1000 milliLiter(s) (100 mL/Hr) IV Continuous <Continuous>  ferrous    sulfate 325 milliGRAM(s) Oral three times a day  gabapentin 800 milliGRAM(s) Oral Once  glucagon  Injectable 1 milliGRAM(s) IntraMuscular once  heparin   Injectable 5000 Unit(s) SubCutaneous every 12 hours  insulin lispro (ADMELOG) corrective regimen sliding scale   SubCutaneous three times a day before meals  isosorbide   mononitrate ER Tablet (IMDUR) 30 milliGRAM(s) Oral daily  metoprolol succinate  milliGRAM(s) Oral daily  pantoprazole    Tablet 20 milliGRAM(s) Oral before breakfast  ranolazine 500 milliGRAM(s) Oral two times a day    MEDICATIONS  (PRN):  dextrose Oral Gel 15 Gram(s) Oral once PRN Blood Glucose LESS THAN 70 milliGRAM(s)/deciliter  melatonin 3 milliGRAM(s) Oral at bedtime PRN Insomnia  metoprolol tartrate Injectable 2.5 milliGRAM(s) IV Push every 6 hours PRN SBP > 160

## 2024-10-10 NOTE — DIETITIAN INITIAL EVALUATION ADULT - OTHER INFO
Patient is a 74 year old female with a past medical history of CAD s/p PCI, PAD with chronic RLE wound s/p debridement on 07/09, HFpEF, HTN, cardiac pacemaker for Mobitz type II, CVA who is admitted due to CHF exacerbation and need for intravenous diuresis.    Acute hypoxic respiratory failure secondary to fluid overload  Acute on chronic HFpEF exacerbation secondary to medication noncompliance  History of CVA, CAD s/p PCI, HTN, HLD

## 2024-10-10 NOTE — DIETITIAN INITIAL EVALUATION ADULT - PERTINENT LABORATORY DATA
10-10    131[L]  |  95[L]  |  38.2[H]  ----------------------------<  183[H]  4.9   |  21.0[L]  |  1.74[H]    Ca    9.3      10 Oct 2024 05:16  Phos  4.1     10-10  Mg     2.3     10-10    TPro  7.7  /  Alb  3.8  /  TBili  0.3[L]  /  DBili  x   /  AST  11  /  ALT  11  /  AlkPhos  89  10-09  POCT Blood Glucose.: 219 mg/dL (10-10-24 @ 12:35)  A1C with Estimated Average Glucose Result: 7.1 % (10-09-24 @ 04:43)  A1C with Estimated Average Glucose Result: 8.4 % (06-18-24 @ 11:50)  A1C with Estimated Average Glucose Result: 8.8 % (05-10-24 @ 03:33)

## 2024-10-11 LAB
ANION GAP SERPL CALC-SCNC: 15 MMOL/L — SIGNIFICANT CHANGE UP (ref 5–17)
BUN SERPL-MCNC: 39.5 MG/DL — HIGH (ref 8–20)
CALCIUM SERPL-MCNC: 8.8 MG/DL — SIGNIFICANT CHANGE UP (ref 8.4–10.5)
CHLORIDE SERPL-SCNC: 96 MMOL/L — SIGNIFICANT CHANGE UP (ref 96–108)
CO2 SERPL-SCNC: 21 MMOL/L — LOW (ref 22–29)
CREAT SERPL-MCNC: 1.88 MG/DL — HIGH (ref 0.5–1.3)
EGFR: 28 ML/MIN/1.73M2 — LOW
GLUCOSE BLDC GLUCOMTR-MCNC: 213 MG/DL — HIGH (ref 70–99)
GLUCOSE BLDC GLUCOMTR-MCNC: 228 MG/DL — HIGH (ref 70–99)
GLUCOSE BLDC GLUCOMTR-MCNC: 244 MG/DL — HIGH (ref 70–99)
GLUCOSE BLDC GLUCOMTR-MCNC: 294 MG/DL — HIGH (ref 70–99)
GLUCOSE SERPL-MCNC: 185 MG/DL — HIGH (ref 70–99)
HCT VFR BLD CALC: 26.7 % — LOW (ref 34.5–45)
HGB BLD-MCNC: 8.6 G/DL — LOW (ref 11.5–15.5)
MCHC RBC-ENTMCNC: 25.6 PG — LOW (ref 27–34)
MCHC RBC-ENTMCNC: 32.2 GM/DL — SIGNIFICANT CHANGE UP (ref 32–36)
MCV RBC AUTO: 79.5 FL — LOW (ref 80–100)
PLATELET # BLD AUTO: 647 K/UL — HIGH (ref 150–400)
POTASSIUM SERPL-MCNC: 5 MMOL/L — SIGNIFICANT CHANGE UP (ref 3.5–5.3)
POTASSIUM SERPL-SCNC: 5 MMOL/L — SIGNIFICANT CHANGE UP (ref 3.5–5.3)
RBC # BLD: 3.36 M/UL — LOW (ref 3.8–5.2)
RBC # FLD: 16.7 % — HIGH (ref 10.3–14.5)
SODIUM SERPL-SCNC: 132 MMOL/L — LOW (ref 135–145)
WBC # BLD: 12.03 K/UL — HIGH (ref 3.8–10.5)
WBC # FLD AUTO: 12.03 K/UL — HIGH (ref 3.8–10.5)

## 2024-10-11 PROCEDURE — 99233 SBSQ HOSP IP/OBS HIGH 50: CPT

## 2024-10-11 RX ORDER — CEFTRIAXONE SODIUM 1 G
1000 VIAL (EA) INJECTION EVERY 24 HOURS
Refills: 0 | Status: DISCONTINUED | OUTPATIENT
Start: 2024-10-11 | End: 2024-10-12

## 2024-10-11 RX ORDER — CEFTRIAXONE SODIUM 1 G
1000 VIAL (EA) INJECTION EVERY 24 HOURS
Refills: 0 | Status: DISCONTINUED | OUTPATIENT
Start: 2024-10-11 | End: 2024-10-11

## 2024-10-11 RX ADMIN — Medication 100 MILLIGRAM(S): at 06:20

## 2024-10-11 RX ADMIN — Medication 4: at 17:08

## 2024-10-11 RX ADMIN — Medication 6: at 12:33

## 2024-10-11 RX ADMIN — Medication 75 MILLIGRAM(S): at 08:20

## 2024-10-11 RX ADMIN — Medication 4: at 08:20

## 2024-10-11 RX ADMIN — Medication 325 MILLIGRAM(S): at 21:59

## 2024-10-11 RX ADMIN — Medication 325 MILLIGRAM(S): at 06:20

## 2024-10-11 RX ADMIN — RANOLAZINE 500 MILLIGRAM(S): 500 TABLET, EXTENDED RELEASE ORAL at 17:09

## 2024-10-11 RX ADMIN — Medication 30 MILLIGRAM(S): at 08:20

## 2024-10-11 RX ADMIN — Medication 5000 UNIT(S): at 06:20

## 2024-10-11 RX ADMIN — Medication 5000 UNIT(S): at 17:09

## 2024-10-11 RX ADMIN — RANOLAZINE 500 MILLIGRAM(S): 500 TABLET, EXTENDED RELEASE ORAL at 06:20

## 2024-10-11 RX ADMIN — Medication 81 MILLIGRAM(S): at 08:20

## 2024-10-11 RX ADMIN — ATORVASTATIN CALCIUM 80 MILLIGRAM(S): 10 TABLET, FILM COATED ORAL at 21:58

## 2024-10-11 RX ADMIN — Medication 325 MILLIGRAM(S): at 13:24

## 2024-10-11 RX ADMIN — PANTOPRAZOLE SODIUM 20 MILLIGRAM(S): 40 TABLET, DELAYED RELEASE ORAL at 06:20

## 2024-10-11 RX ADMIN — Medication 1000 MILLIGRAM(S): at 14:43

## 2024-10-11 NOTE — PROGRESS NOTE ADULT - ASSESSMENT
Patient is a 74 year old female with a past medical history of CAD s/p PCI, PAD with chronic RLE wound s/p debridement on 07/09, HFpEF, HTN, cardiac pacemaker for Mobitz type II, CVA who is admitted due to CHF exacerbation and need for intravenous diuresis.    Acute hypoxic respiratory failure secondary to fluid overload  Acute on chronic HFpEF exacerbation secondary to medication noncompliance  History of CVA, CAD s/p PCI, HTN, HLD  - Patient not complaint with Lasix at home; Crackles auscultated  - Requiring 2L NC at this time  - proBNP elevated at 16k, creatinine elevated at 1.41 (above baseline)  - EKG without any acute changes, troponin negative x2  - CXR showing pulmonary vascular congestion  - Cardiology and Nephrology are following the case  - Held Lasix IV due to uptrending creatinine    Plan:  - Continue aspirin 81mg daily and Plavix 75mg daily  - Continue metoprolol succinate 100mg daily  - Continue isosorbide mononitrate 30mg daily  - Continue amlodipine 10mg daily  - Continue ranolazine 500mg Q12H  - Continue atorvastatin 80mg HS    Acute kidney injury, worsening today  Hyperkalemia  Hyponatremia, likely hypervolemic in the setting of CHF  - K elevated at 6.0; It was 5.9 as outpatient  - Spironolactone is being held; Valsartan and Bactrim also being held  - S/p insulin and D50 in the emergency room  - Nephrology is following the case, agree with holding Lasix     Complicated urinary tract infection  - UA showing large leukocyte esterases  - WBC elevated at 17.93 on admission, now downtrending  - Patient was taking Macrobid as outpatient for two days  - Patient endorses left flank pain; CTAP showing pyelo vs passed stone in L kidney  - Continue ceftriaxone 1g Q24H    Chronic RLE wound s/p debridement  - Not complaining of worsening symptoms at this time  - Follow up wound care consult     Diabetes mellitus, type II  - Hold home metformin at this time  - Started on insulin sliding scale  - Monitor POCT glucose and adjust regimen as indicated  - Continue gabapentin at home dose of 800mg    Microcytic anemia, likely secondary to iron deficiency  - Continue ferrous sulfate  - GI consulted today, and recommend outpatient follow up for colonoscopy      DVT/GI ppx: Heparin subq, Protonix  Diet: Carb consistent diet  Code Status: Full code  Dispo: Home in 1-2 days after creatinine/WBC resolution, pending Cardio/Nephro clearance

## 2024-10-12 LAB
-  AMPICILLIN/SULBACTAM: SIGNIFICANT CHANGE UP
-  AMPICILLIN: SIGNIFICANT CHANGE UP
-  AZTREONAM: SIGNIFICANT CHANGE UP
-  CEFAZOLIN: SIGNIFICANT CHANGE UP
-  CEFEPIME: SIGNIFICANT CHANGE UP
-  CEFTRIAXONE: SIGNIFICANT CHANGE UP
-  CEFUROXIME: SIGNIFICANT CHANGE UP
-  CIPROFLOXACIN: SIGNIFICANT CHANGE UP
-  ERTAPENEM: SIGNIFICANT CHANGE UP
-  GENTAMICIN: SIGNIFICANT CHANGE UP
-  IMIPENEM: SIGNIFICANT CHANGE UP
-  LEVOFLOXACIN: SIGNIFICANT CHANGE UP
-  MEROPENEM: SIGNIFICANT CHANGE UP
-  NITROFURANTOIN: SIGNIFICANT CHANGE UP
-  PIPERACILLIN/TAZOBACTAM: SIGNIFICANT CHANGE UP
-  TOBRAMYCIN: SIGNIFICANT CHANGE UP
-  TRIMETHOPRIM/SULFAMETHOXAZOLE: SIGNIFICANT CHANGE UP
ANION GAP SERPL CALC-SCNC: 13 MMOL/L — SIGNIFICANT CHANGE UP (ref 5–17)
APPEARANCE UR: CLEAR — SIGNIFICANT CHANGE UP
BACTERIA # UR AUTO: NEGATIVE /HPF — SIGNIFICANT CHANGE UP
BILIRUB UR-MCNC: NEGATIVE — SIGNIFICANT CHANGE UP
BUN SERPL-MCNC: 41.7 MG/DL — HIGH (ref 8–20)
CALCIUM SERPL-MCNC: 8.8 MG/DL — SIGNIFICANT CHANGE UP (ref 8.4–10.5)
CAST: 0 /LPF — SIGNIFICANT CHANGE UP (ref 0–4)
CHLORIDE SERPL-SCNC: 99 MMOL/L — SIGNIFICANT CHANGE UP (ref 96–108)
CO2 SERPL-SCNC: 19 MMOL/L — LOW (ref 22–29)
COLOR SPEC: YELLOW — SIGNIFICANT CHANGE UP
CREAT ?TM UR-MCNC: 33 MG/DL — SIGNIFICANT CHANGE UP
CREAT SERPL-MCNC: 1.81 MG/DL — HIGH (ref 0.5–1.3)
CULTURE RESULTS: ABNORMAL
DIFF PNL FLD: NEGATIVE — SIGNIFICANT CHANGE UP
EGFR: 29 ML/MIN/1.73M2 — LOW
GLUCOSE BLDC GLUCOMTR-MCNC: 153 MG/DL — HIGH (ref 70–99)
GLUCOSE BLDC GLUCOMTR-MCNC: 268 MG/DL — HIGH (ref 70–99)
GLUCOSE BLDC GLUCOMTR-MCNC: 282 MG/DL — HIGH (ref 70–99)
GLUCOSE SERPL-MCNC: 257 MG/DL — HIGH (ref 70–99)
GLUCOSE UR QL: NEGATIVE MG/DL — SIGNIFICANT CHANGE UP
HCT VFR BLD CALC: 28.4 % — LOW (ref 34.5–45)
HGB BLD-MCNC: 9.1 G/DL — LOW (ref 11.5–15.5)
KETONES UR-MCNC: NEGATIVE MG/DL — SIGNIFICANT CHANGE UP
LEUKOCYTE ESTERASE UR-ACNC: ABNORMAL
MAGNESIUM SERPL-MCNC: 2.8 MG/DL — HIGH (ref 1.6–2.6)
MCHC RBC-ENTMCNC: 25.9 PG — LOW (ref 27–34)
MCHC RBC-ENTMCNC: 32 GM/DL — SIGNIFICANT CHANGE UP (ref 32–36)
MCV RBC AUTO: 80.7 FL — SIGNIFICANT CHANGE UP (ref 80–100)
METHOD TYPE: SIGNIFICANT CHANGE UP
NITRITE UR-MCNC: NEGATIVE — SIGNIFICANT CHANGE UP
ORGANISM # SPEC MICROSCOPIC CNT: ABNORMAL
ORGANISM # SPEC MICROSCOPIC CNT: SIGNIFICANT CHANGE UP
PH UR: 6.5 — SIGNIFICANT CHANGE UP (ref 5–8)
PHOSPHATE SERPL-MCNC: 4.5 MG/DL — SIGNIFICANT CHANGE UP (ref 2.4–4.7)
PLATELET # BLD AUTO: 649 K/UL — HIGH (ref 150–400)
POTASSIUM SERPL-MCNC: 5.3 MMOL/L — SIGNIFICANT CHANGE UP (ref 3.5–5.3)
POTASSIUM SERPL-SCNC: 5.3 MMOL/L — SIGNIFICANT CHANGE UP (ref 3.5–5.3)
PROT ?TM UR-MCNC: 12 MG/DL — SIGNIFICANT CHANGE UP (ref 0–12)
PROT UR-MCNC: NEGATIVE MG/DL — SIGNIFICANT CHANGE UP
PROT/CREAT UR-RTO: 0.4 RATIO — HIGH
RBC # BLD: 3.52 M/UL — LOW (ref 3.8–5.2)
RBC # FLD: 16.9 % — HIGH (ref 10.3–14.5)
RBC CASTS # UR COMP ASSIST: 1 /HPF — SIGNIFICANT CHANGE UP (ref 0–4)
SODIUM SERPL-SCNC: 131 MMOL/L — LOW (ref 135–145)
SP GR SPEC: 1.01 — SIGNIFICANT CHANGE UP (ref 1–1.03)
SPECIMEN SOURCE: SIGNIFICANT CHANGE UP
SQUAMOUS # UR AUTO: 1 /HPF — SIGNIFICANT CHANGE UP (ref 0–5)
UROBILINOGEN FLD QL: 0.2 MG/DL — SIGNIFICANT CHANGE UP (ref 0.2–1)
WBC # BLD: 15.14 K/UL — HIGH (ref 3.8–10.5)
WBC # FLD AUTO: 15.14 K/UL — HIGH (ref 3.8–10.5)
WBC UR QL: 114 /HPF — HIGH (ref 0–5)

## 2024-10-12 PROCEDURE — 99222 1ST HOSP IP/OBS MODERATE 55: CPT

## 2024-10-12 PROCEDURE — 99232 SBSQ HOSP IP/OBS MODERATE 35: CPT

## 2024-10-12 PROCEDURE — 99233 SBSQ HOSP IP/OBS HIGH 50: CPT

## 2024-10-12 RX ORDER — FUROSEMIDE 10 MG/ML
40 INJECTION INTRAVENOUS DAILY
Refills: 0 | Status: DISCONTINUED | OUTPATIENT
Start: 2024-10-12 | End: 2024-10-12

## 2024-10-12 RX ORDER — SODIUM CHLORIDE 0.9 % (FLUSH) 0.9 %
1000 SYRINGE (ML) INJECTION
Refills: 0 | Status: DISCONTINUED | OUTPATIENT
Start: 2024-10-12 | End: 2024-10-12

## 2024-10-12 RX ORDER — MEROPENEM 500 MG/20ML
1000 INJECTION INTRAVENOUS EVERY 12 HOURS
Refills: 0 | Status: DISCONTINUED | OUTPATIENT
Start: 2024-10-12 | End: 2024-10-12

## 2024-10-12 RX ADMIN — Medication 100 MILLIGRAM(S): at 05:48

## 2024-10-12 RX ADMIN — FUROSEMIDE 40 MILLIGRAM(S): 10 INJECTION INTRAVENOUS at 09:27

## 2024-10-12 RX ADMIN — Medication 325 MILLIGRAM(S): at 05:47

## 2024-10-12 RX ADMIN — Medication 6: at 09:27

## 2024-10-12 RX ADMIN — Medication 30 MILLIGRAM(S): at 13:58

## 2024-10-12 RX ADMIN — RANOLAZINE 500 MILLIGRAM(S): 500 TABLET, EXTENDED RELEASE ORAL at 17:53

## 2024-10-12 RX ADMIN — Medication 325 MILLIGRAM(S): at 22:09

## 2024-10-12 RX ADMIN — RANOLAZINE 500 MILLIGRAM(S): 500 TABLET, EXTENDED RELEASE ORAL at 05:48

## 2024-10-12 RX ADMIN — Medication 325 MILLIGRAM(S): at 13:58

## 2024-10-12 RX ADMIN — Medication 5000 UNIT(S): at 05:47

## 2024-10-12 RX ADMIN — Medication 6: at 13:59

## 2024-10-12 RX ADMIN — Medication 81 MILLIGRAM(S): at 13:59

## 2024-10-12 RX ADMIN — ATORVASTATIN CALCIUM 80 MILLIGRAM(S): 10 TABLET, FILM COATED ORAL at 22:09

## 2024-10-12 RX ADMIN — Medication 2: at 17:54

## 2024-10-12 RX ADMIN — PANTOPRAZOLE SODIUM 20 MILLIGRAM(S): 40 TABLET, DELAYED RELEASE ORAL at 05:47

## 2024-10-12 RX ADMIN — Medication 5000 UNIT(S): at 17:53

## 2024-10-12 RX ADMIN — Medication 75 MILLIGRAM(S): at 13:58

## 2024-10-12 NOTE — CONSULT NOTE ADULT - ASSESSMENT
Incomplete note    Pt to be seen today 74 F PMH CAD, PAD, chronic RLE wound, CHF, HTN, pacemaker (Mobitz II), CVA, who was admitted here with CHF exacerbation.     Acute on chronic CHF exacerbation  Dysuria prior to admission however she states it resolved (s/p Macrobid)  No dysuria while admitted here  Abnormal UA; undetermined significance   No fevers  Procalcitonin normal  Leukocytosis, likely multifactorial (e.g. acute CHF exacerbation could be causing it)  Positive urine culture, >100k ESBL E Coli     Plan:  Given pt has no urinary symptoms, no fevers and negative procalcitonin, suspect + ESBL urine cx represents colonization as opposed to true infection   Stop Meropenem that was started today   Monitor pt off antibiotics  Monitor fever curve  Trend leukocytosis to improvement     ID to sign off - please recall with questions - I am available on TEAMS.

## 2024-10-12 NOTE — CONSULT NOTE ADULT - SUBJECTIVE AND OBJECTIVE BOX
Incomplete  [facility]  INFECTIOUS DISEASES at [place] / [place] / [place]  =======================================================                              Leo Lam MD                              Professor Emeritus:  Dr Angelo Sebastian MD            Diplomates American Board of Internal Medicine & Infectious Diseases                                   Tel  [phone] Fax [phone]                                  Hospital Consult line:  [phone]  =======================================================    Patient is a 74 Female who presents with a chief complaint of SOB     HPI:  74 F PMH CAD, PAD, chronic RLE wound, CHF, HTN, pacemaker (Mobitz II), CVA, who was admitted here with CHF exacerbation.     Acute on chronic CHF exacerbation. She had dysuria prior to admission however she states it resolved (s/p Macrobid). No dysuria while admitted here. No fevers. Procalcitonin normal. She has had leukocytosis this entire admission, likely multifactorial (e.g. acute CHF exacerbation could be causing it). ID consulted for recommendations as urine cx grew >100k ESBL E Coli.      REVIEW OF SYSTEMS  Constitutional: No fevers  Skin: No rash	  Eyes: No discharge	  ENMT: No sore throat  Respiratory: No cough  Cardiovascular: No chest pain  Gastrointestinal: No pain  Genitourinary: No dysuria  MSK: No arthralgias   Neurological: No AMS     prior hospital charts reviewed [V]  primary team notes reviewed [V]  other consultant notes reviewed [V]    PAST MEDICAL & SURGICAL HISTORY:  DM (diabetes mellitus)    HTN (hypertension)    H/O gastroesophageal reflux (GERD)    Cardiac pacemaker  MICRA for mobitz type 11    CVA (cerebrovascular accident)  resdiual right sided weakness    CVA (cerebrovascular accident)    PAD (peripheral artery disease)    Wound of right leg    History of benign brain tumor    Cataract    History of biopsy    Cardiac pacemaker    S/P angiogram of extremity    SOCIAL HISTORY:  No sick contacts     FAMILY HISTORY:  FH: asthma  Son    Allergies  No Known Allergies    ANTIMICROBIALS:  meropenem Injectable 1000 every 12 hours    ANTIMICROBIALS (past 90 days):  MEDICATIONS  (STANDING):    cefTRIAXone Injectable.   1000 milliGRAM(s) IV Push (10-11-24 @ 14:43)    cefTRIAXone Injectable.   1000 milliGRAM(s) IV Push (10-08-24 @ 17:23)    OTHER MEDS:   MEDICATIONS  (STANDING):  aspirin  chewable 81 daily  atorvastatin 80 at bedtime  clopidogrel Tablet 75 daily  dextrose Oral Gel 15 once PRN  glucagon  Injectable 1 once  heparin   Injectable 5000 every 12 hours  insulin lispro (ADMELOG) corrective regimen sliding scale  three times a day before meals  isosorbide   mononitrate ER Tablet (IMDUR) 30 daily  melatonin 3 at bedtime PRN  metoprolol succinate  daily  metoprolol tartrate Injectable 2.5 every 6 hours PRN  pantoprazole    Tablet 20 before breakfast  ranolazine 500 two times a day    VITALS:  Vital Signs Last 24 Hrs  T(F): 98 (10-12-24 @ 08:45), Max: 99 (10-08-24 @ 13:03)    Vital Signs Last 24 Hrs  HR: 68 (10-12-24 @ 08:45) (61 - 68)  BP: 145/90 (10-12-24 @ 08:45) (112/68 - 147/83)  RR: 18 (10-12-24 @ 08:45)  SpO2: 99% (10-12-24 @ 08:45) (94% - 99%)  Wt(kg): --    EXAM:  General: Patient in no acute distress   HEENT: NCAT, EOMI  CV: S1+S2  Lungs: No respiratory distress  Abd: Soft  Ext: No cyanosis  Neuro: Alert and oriented, calm   Skin: No rash     Labs:                        9.1    15.14 )-----------( 649      ( 12 Oct 2024 05:15 )             28.4     10-12    131[L]  |  99  |  41.7[H]  ----------------------------<  257[H]  5.3   |  19.0[L]  |  1.81[H]    Ca    8.8      12 Oct 2024 05:15  Phos  4.5     10-12  Mg     2.8     10-12    WBC Trend:  WBC Count: 15.14 (10-12-24 @ 05:15)  WBC Count: 12.03 (10-11-24 @ 04:55)  WBC Count: 11.39 (10-10-24 @ 05:16)  WBC Count: 13.02 (10-09-24 @ 04:43)    Auto Neutrophil #: 8.88 K/uL (10-09-24 @ 04:43)  Auto Neutrophil #: 14.18 K/uL (10-08-24 @ 11:18)  Auto Neutrophil #: 4.90 K/uL (07-17-24 @ 06:45)  Auto Neutrophil #: 7.16 K/uL (07-16-24 @ 17:30)  Auto Neutrophil #: 7.40 K/uL (07-15-24 @ 18:50)    Creatine Trend:  Creatinine: 1.81 (10-12)  Creatinine: 1.88 (10-11)  Creatinine: 1.74 (10-10)  Creatinine: 1.52 (10-09)    Liver Biochemical Testing Trend:  Alanine Aminotransferase (ALT/SGPT): 11 (10-09)  Alanine Aminotransferase (ALT/SGPT): 11 (10-08)  Alanine Aminotransferase (ALT/SGPT): 31 (07-17)  Alanine Aminotransferase (ALT/SGPT): 44 *H* (07-16)  Alanine Aminotransferase (ALT/SGPT): 44 *H* (07-16)  Aspartate Aminotransferase (AST/SGOT): 11 (10-09-24 @ 04:43)  Aspartate Aminotransferase (AST/SGOT): 18 (10-08-24 @ 11:18)  Aspartate Aminotransferase (AST/SGOT): 12 (07-17-24 @ 06:45)  Aspartate Aminotransferase (AST/SGOT): 20 (07-16-24 @ 17:30)  Aspartate Aminotransferase (AST/SGOT): 20 (07-16-24 @ 17:30)  Bilirubin Total: 0.3 (10-09)  Bilirubin Total: 0.4 (10-08)  Bilirubin Total: 0.6 (07-17)  Bilirubin Direct: 0.2 (07-17)  Bilirubin Total: 0.6 (07-16)    Urinalysis Basic - ( 12 Oct 2024 05:15 )    Color: x / Appearance: x / SG: x / pH: x  Gluc: 257 mg/dL / Ketone: x  / Bili: x / Urobili: x   Blood: x / Protein: x / Nitrite: x   Leuk Esterase: x / RBC: x / WBC x   Sq Epi: x / Non Sq Epi: x / Bacteria: x    MICROBIOLOGY:    MRSA PCR Result.: [facility] (07-12-24 @ 17:45)  MRSA PCR Result.: [facility] (05-10-24 @ 10:10)    Culture - Urine (collected 08 Oct 2024 14:19)  Source: Clean Catch Clean Catch (Midstream)  Final Report:    >100,000 CFU/ml Escherichia coli ESBL  Organism: Escherichia coli ESBL  Organism: Escherichia coli ESBL    Sensitivities:      Method Type: RENNY      -  Ampicillin: R >16 These ampicillin results predict results for amoxicillin      -  Ampicillin/Sulbactam: R >16/8      -  Aztreonam: R >16      -  Cefazolin: R >16 For uncomplicated UTI with K. pneumoniae, E. coli, or P. mirablis: RENNY <=16 is sensitive and RENNY >=32 is resistant. This also predicts results for oral agents cefaclor, cefdinir, cefpodoxime, cefprozil, cefuroxime axetil, cephalexin and locarbef for uncomplicated UTI. Note that some isolates may be susceptible to these agents while testing resistant to cefazolin.      -  Cefepime: R >16      -  Ceftriaxone: R >32      -  Cefuroxime: R >16      -  Ciprofloxacin: R >2      -  Ertapenem: S <=0.5      -  Gentamicin: S <=2      -  Imipenem: S <=1      -  Levofloxacin: R >4      -  Meropenem: S <=1      -  Nitrofurantoin: S <=32 Should not be used to treat pyelonephritis      -  Piperacillin/Tazobactam: S <=8      -  Tobramycin: S <=2      -  Trimethoprim/Sulfamethoxazole: R >2/38    Culture - Blood (collected 10 May 2024 03:37)  Source: .Blood Blood  Final Report:    No growth at 5 days    Culture - Blood (collected 10 May 2024 03:33)  Source: .Blood Blood  Final Report:    No growth at 5 days    Culture - Blood (collected 02 Mar 2024 18:15)  Source: .Blood Blood-Peripheral  Final Report:    No growth at 5 days    Culture - Urine (collected 02 Mar 2024 18:15)  Source: Clean Catch Clean Catch (Midstream)  Final Report:    >100,000 CFU/ml Escherichia coli ESBL  Organism: Escherichia coli ESBL  Organism: Escherichia coli ESBL    Sensitivities:      Method Type: RENNY      -  Amoxicillin/Clavulanic Acid: S <=8/4      -  Ampicillin: R >16 These ampicillin results predict results for amoxicillin      -  Ampicillin/Sulbactam: I 16/8      -  Aztreonam: R 16      -  Cefazolin: R >16 For uncomplicated UTI with K. pneumoniae, E. coli, or P. mirablis: RENNY <=16 is sensitive and RENNY >=32 is resistant. This also predicts results for oral agents cefaclor, cefdinir, cefpodoxime, cefprozil, cefuroxime axetil, cephalexin and locarbef for uncomplicated UTI. Note that some isolates may be susceptible to these agents while testing resistant to cefazolin.      -  Cefepime: R 8      -  Ceftriaxone: R >32      -  Cefuroxime: R >16      -  Ciprofloxacin: R >2      -  Ertapenem: S <=0.5      -  Gentamicin: S <=2      -  Imipenem: S <=1      -  Levofloxacin: R >4      -  Meropenem: S <=1      -  Nitrofurantoin: S <=32 Should not be used to treat pyelonephritis      -  Piperacillin/Tazobactam: S <=8      -  Tobramycin: S <=2      -  Trimethoprim/Sulfamethoxazole: R >2/38    Culture - Blood (collected 02 Mar 2024 18:10)  Source: .Blood Blood-Peripheral  Final Report:    No growth at 5 days    Procalcitonin: 0.10 (10-09)    C-Reactive Protein: 26 (10-09)    Ferritin: 102 (10-09)    Troponin T, High Sensitivity Result: 25 (10-08)  Troponin T, High Sensitivity Result: 30 (10-08)    A1C with Estimated Average Glucose Result: 7.1 % (10-09-24 @ 04:43)  A1C with Estimated Average Glucose Result: 8.4 % (06-18-24 @ 11:50)  A1C with Estimated Average Glucose Result: 8.8 % (05-10-24 @ 03:33)    RADIOLOGY:  imaging below personally reviewed    < from: CT Abdomen and Pelvis No Cont (10.08.24 @ 18:42) >  IMPRESSION:  *  No hydronephrosis or urinary tract calculi.  *  Enlarged edematous left kidney can be seen in the setting of recently   passed stone versus pyelonephritis.    < end of copied text >

## 2024-10-12 NOTE — PROGRESS NOTE ADULT - ASSESSMENT
73 YOF with HTN, CAD, PAD and HFpEF and UTI currently on Bactrim presenting with shortness of breath for 3 days in setting of discontinuing Lasix.  Nephrology consulted for BRADY    Acute kidney injury:  ·	Baseline serum creatinine 0.9-1 mg/deciliter  ·	Serum creatinine trend 1.4>1.49>1.52>1.74>1.88>1.81 mg/deciliter  ·	BRADY likely related to Bactrim use along with concomitant use of valsartan  ·	UA reviewed cloudy, large leukocyte esterase and pyuria  ·	Holding valsartan, discontinued Bactrim  ·	Diuretics were placed on hold yesterday  ·	Recommend to give 1 L of normal saline at 75 cc/h  ·	Expecting serum creatinine to slowly improve with above measures.    Hyperkalemia: BRADY, Bactrim, valsartan.  Hold valsartan, discontinue Bactrim.  Potassium has improved post medical management in ER.  Level is 5.3 today, will follow.    Hyponatremia: Continue on fluid restriction of less than 2 L/day. Serum sodium has improved to 131, will see the effect of 1 L of normal saline on it.    Volume status: As above holding diuretics and giving 1 L of normal saline trial.    Hypertension: Continue with Imdur 30 mg daily, metoprolol succinate 100 mg daily.  As above hold valsartan.    Anemia, unspecified: c/w ferrous sulphate 325 mg po TID.     Discussed with Dr. Hightower.

## 2024-10-12 NOTE — PROGRESS NOTE ADULT - ASSESSMENT
Assessment:    74 year old female with a past medical history of CAD s/p PCI, PAD with chronic RLE wound s/p debridement on 07/09, HFpEF, HTN, cardiac pacemaker for Mobitz type II, CVA who is admitted due to CHF exacerbation and need for intravenous diuresis.    Acute hypoxic respiratory failure secondary to fluid overload  Acute on chronic HFpEF exacerbation secondary to medication noncompliance  History of CVA, CAD s/p PCI, HTN, HLD  - Patient not complaint with Lasix at home; Crackles auscultated  - Requiring 2L NC at this time  - proBNP elevated at 16k, creatinine elevated at 1.41 (above baseline)  - EKG without any acute changes, troponin negative x2  - CXR showing pulmonary vascular congestion  - Cardiology and Nephrology are following the case  - lasix 40mg oral today    Plan:  - Continue aspirin 81mg daily and Plavix 75mg daily  - Continue metoprolol succinate 100mg daily  - Continue isosorbide mononitrate 30mg daily  - Continue amlodipine 10mg daily  - Continue ranolazine 500mg Q12H  - Continue atorvastatin 80mg HS    Acute kidney injury, worsening today  Hyperkalemia  Hyponatremia, likely hypervolemic in the setting of CHF  - K elevated at 6.0; It was 5.9 as outpatient  - Spironolactone is being held; Valsartan and Bactrim also being held  - S/p insulin and D50 in the emergency room  - Will dose with lasix 40mg oral once and see how Cr is   - Renal on board     Complicated urinary tract infection  - UA showing large leukocyte esterases  - WBC elevated at 17.93 on admission, now downtrending  - Patient was taking Macrobid as outpatient for two days  - Patient endorses left flank pain; CTAP showing pyelo vs passed stone in L kidney  - Continue ceftriaxone 1g Q24H for 7 days  -Fu urine cx 10/8: >100k Ecoli. Sensitivities to follow    Chronic RLE wound s/p debridement  - Not complaining of worsening symptoms at this time  - Follow up wound care consult     Diabetes mellitus, type II  - Hold home metformin at this time  - Started on insulin sliding scale  - Monitor POCT glucose and adjust regimen as indicated  - Continue gabapentin at home dose of 800mg    Microcytic anemia, likely secondary to iron deficiency  - Continue ferrous sulfate  - GI consulted today, and recommend outpatient follow up for colonoscopy            DVT ppx: Heparin sq        Disposition: Possibly tomorrow pending kidney function.

## 2024-10-13 LAB
ANION GAP SERPL CALC-SCNC: 14 MMOL/L — SIGNIFICANT CHANGE UP (ref 5–17)
BUN SERPL-MCNC: 40.5 MG/DL — HIGH (ref 8–20)
CALCIUM SERPL-MCNC: 8.9 MG/DL — SIGNIFICANT CHANGE UP (ref 8.4–10.5)
CHLORIDE SERPL-SCNC: 98 MMOL/L — SIGNIFICANT CHANGE UP (ref 96–108)
CO2 SERPL-SCNC: 17 MMOL/L — LOW (ref 22–29)
CREAT SERPL-MCNC: 1.25 MG/DL — SIGNIFICANT CHANGE UP (ref 0.5–1.3)
EGFR: 46 ML/MIN/1.73M2 — LOW
GLUCOSE BLDC GLUCOMTR-MCNC: 138 MG/DL — HIGH (ref 70–99)
GLUCOSE BLDC GLUCOMTR-MCNC: 210 MG/DL — HIGH (ref 70–99)
GLUCOSE BLDC GLUCOMTR-MCNC: 256 MG/DL — HIGH (ref 70–99)
GLUCOSE BLDC GLUCOMTR-MCNC: 312 MG/DL — HIGH (ref 70–99)
GLUCOSE SERPL-MCNC: 252 MG/DL — HIGH (ref 70–99)
HCT VFR BLD CALC: 28.3 % — LOW (ref 34.5–45)
HGB BLD-MCNC: 9.2 G/DL — LOW (ref 11.5–15.5)
MCHC RBC-ENTMCNC: 25.8 PG — LOW (ref 27–34)
MCHC RBC-ENTMCNC: 32.5 GM/DL — SIGNIFICANT CHANGE UP (ref 32–36)
MCV RBC AUTO: 79.5 FL — LOW (ref 80–100)
PLATELET # BLD AUTO: 673 K/UL — HIGH (ref 150–400)
POTASSIUM SERPL-MCNC: 5.4 MMOL/L — HIGH (ref 3.5–5.3)
POTASSIUM SERPL-SCNC: 5.4 MMOL/L — HIGH (ref 3.5–5.3)
RBC # BLD: 3.56 M/UL — LOW (ref 3.8–5.2)
RBC # FLD: 17.2 % — HIGH (ref 10.3–14.5)
SODIUM SERPL-SCNC: 129 MMOL/L — LOW (ref 135–145)
WBC # BLD: 15.09 K/UL — HIGH (ref 3.8–10.5)
WBC # FLD AUTO: 15.09 K/UL — HIGH (ref 3.8–10.5)

## 2024-10-13 PROCEDURE — 76775 US EXAM ABDO BACK WALL LIM: CPT | Mod: 26

## 2024-10-13 PROCEDURE — 99232 SBSQ HOSP IP/OBS MODERATE 35: CPT

## 2024-10-13 PROCEDURE — 99233 SBSQ HOSP IP/OBS HIGH 50: CPT

## 2024-10-13 RX ORDER — SODIUM CHLORIDE 0.9 % (FLUSH) 0.9 %
1000 SYRINGE (ML) INJECTION
Refills: 0 | Status: COMPLETED | OUTPATIENT
Start: 2024-10-13 | End: 2024-10-13

## 2024-10-13 RX ADMIN — RANOLAZINE 500 MILLIGRAM(S): 500 TABLET, EXTENDED RELEASE ORAL at 05:47

## 2024-10-13 RX ADMIN — PANTOPRAZOLE SODIUM 20 MILLIGRAM(S): 40 TABLET, DELAYED RELEASE ORAL at 05:50

## 2024-10-13 RX ADMIN — Medication 30 MILLIGRAM(S): at 13:11

## 2024-10-13 RX ADMIN — RANOLAZINE 500 MILLIGRAM(S): 500 TABLET, EXTENDED RELEASE ORAL at 17:10

## 2024-10-13 RX ADMIN — Medication 5000 UNIT(S): at 05:48

## 2024-10-13 RX ADMIN — Medication 6: at 13:11

## 2024-10-13 RX ADMIN — Medication 81 MILLIGRAM(S): at 13:10

## 2024-10-13 RX ADMIN — Medication 5000 UNIT(S): at 17:10

## 2024-10-13 RX ADMIN — Medication 8: at 09:11

## 2024-10-13 RX ADMIN — Medication 100 MILLIGRAM(S): at 05:48

## 2024-10-13 RX ADMIN — Medication 325 MILLIGRAM(S): at 21:45

## 2024-10-13 RX ADMIN — Medication 75 MILLILITER(S): at 12:49

## 2024-10-13 RX ADMIN — Medication 325 MILLIGRAM(S): at 05:48

## 2024-10-13 RX ADMIN — Medication 325 MILLIGRAM(S): at 13:10

## 2024-10-13 RX ADMIN — Medication 4: at 17:11

## 2024-10-13 RX ADMIN — Medication 75 MILLIGRAM(S): at 13:10

## 2024-10-13 RX ADMIN — ATORVASTATIN CALCIUM 80 MILLIGRAM(S): 10 TABLET, FILM COATED ORAL at 21:45

## 2024-10-13 NOTE — PROGRESS NOTE ADULT - TIME BILLING
Time spent reviewing patient's chart, labwork, orders, documenting care, coordinating care with consultants, and discussing patient's care with family members.
Time spent reviewing the chart documentation, reviewing labs and imaging studies, evaluating the patient, discussing the plan of care with the consultants & medical team, and documenting.
Time spent reviewing the chart documentation, reviewing labs and imaging studies, evaluating the patient, discussing the plan of care with the consultants & medical team, and documenting.

## 2024-10-13 NOTE — PROGRESS NOTE ADULT - ASSESSMENT
Assessment:    74 year old female with a past medical history of CAD s/p PCI, PAD with chronic RLE wound s/p debridement on 07/09, HFpEF, HTN, cardiac pacemaker for Mobitz type II, CVA who is admitted due to CHF exacerbation and need for intravenous diuresis.    Acute hypoxic respiratory failure secondary to fluid overload  Acute on chronic HFpEF exacerbation secondary to medication noncompliance  History of CVA, CAD s/p PCI, HTN, HLD  - Patient not complaint with Lasix at home; Crackles auscultated  - Requiring 2L NC at this time  - proBNP elevated at 16k, creatinine elevated at 1.41 (above baseline)  - EKG without any acute changes, troponin negative x2  - CXR showing pulmonary vascular congestion  - Cardiology and Nephrology are following the case  - lasix 40mg oral yesterday      - Continue aspirin 81mg daily and Plavix 75mg daily  - Continue metoprolol succinate 100mg daily  - Continue isosorbide mononitrate 30mg daily  - Continue amlodipine 10mg daily  - Continue ranolazine 500mg Q12H  - Continue atorvastatin 80mg HS    Acute kidney injury, worsening today  Hyperkalemia  Hyponatremia, likely hypervolemic in the setting of CHF  - K elevated at 6.0; It was 5.9 as outpatient  - Spironolactone is being held; Valsartan and Bactrim also being held  - S/p insulin and D50 in the emergency room  - s/p Lasix once  - Trial of IVF today   - Renal on board     Complicated urinary tract infection- Asymptomatic Bacturia per ID no need to treat for ESBL ecoli in urine   - UA showing large leukocyte esterases  - WBC elevated at 17.93 on admission, now downtrending  - Patient was taking Macrobid as outpatient for two days  - Patient endorses left flank pain; CTAP showing pyelo vs passed stone in L kidney  - s/p ceftriaxone 1g Q24H for 5 days  -Fu urine cx 10/8: >100k Ecoli. Sensitivities to follow    Chronic RLE wound s/p debridement  - Not complaining of worsening symptoms at this time  - Follow up wound care consult     Diabetes mellitus, type II  - Hold home metformin at this time  - Started on insulin sliding scale  - Monitor POCT glucose and adjust regimen as indicated  - Continue gabapentin at home dose of 800mg    Microcytic anemia, likely secondary to iron deficiency  - Continue ferrous sulfate  - GI consulted today, and recommend outpatient follow up for colonoscopy        DVT ppx: Heparin sq      Disposition: Possibly tomorrow pending kidney function.

## 2024-10-13 NOTE — PROGRESS NOTE ADULT - ASSESSMENT
73 YOF with HTN, CAD, PAD and HFpEF and UTI currently on Bactrim presenting with shortness of breath for 3 days in setting of discontinuing Lasix.  Nephrology consulted for BRADY    Acute kidney injury:  ·	Baseline serum creatinine 0.9-1 mg/deciliter  ·	Serum creatinine trend 1.4>1.49>1.52>1.74>1.88>1.81>1.25 mg/deciliter IMPROVING  ·	BRADY likely related to Bactrim use along with concomitant use of valsartan  ·	UA reviewed cloudy, large leukocyte esterase and pyuria  ·	Holding valsartan, discontinued Bactrim  ·	Diuretics were placed on hold this AM  ·	Recommend to give 1 L of normal saline at 75 cc/h    Hyperkalemia: BRADY, Bactrim, valsartan.  Hold valsartan, discontinue Bactrim.  Potassium has improved post medical management in ER.  Level is 5.4 today, will follow.    Hyponatremia: Continue on fluid restriction of less than 2 L/day. Serum sodium still low at 129, will see the effect of 1 L of normal saline on it.    Volume status: As above holding diuretics and giving 1 L of normal saline trial.    Hypertension: Continue with Imdur 30 mg daily, metoprolol succinate 100 mg daily.  As above hold valsartan.    Anemia, unspecified: c/w ferrous sulphate 325 mg po TID.     Discussed with Dr. Nelson.

## 2024-10-13 NOTE — PROGRESS NOTE ADULT - TIME-BASED BILLING (NON-CRITICAL CARE)
Time-based billing (NON-critical care)
no Active ROM deficits were identified

## 2024-10-14 ENCOUNTER — NON-APPOINTMENT (OUTPATIENT)
Age: 74
End: 2024-10-14

## 2024-10-14 ENCOUNTER — TRANSCRIPTION ENCOUNTER (OUTPATIENT)
Age: 74
End: 2024-10-14

## 2024-10-14 VITALS
TEMPERATURE: 98 F | SYSTOLIC BLOOD PRESSURE: 145 MMHG | RESPIRATION RATE: 18 BRPM | HEART RATE: 77 BPM | DIASTOLIC BLOOD PRESSURE: 68 MMHG | OXYGEN SATURATION: 98 %

## 2024-10-14 LAB
ANION GAP SERPL CALC-SCNC: 15 MMOL/L — SIGNIFICANT CHANGE UP (ref 5–17)
BUN SERPL-MCNC: 28 MG/DL — HIGH (ref 8–20)
CALCIUM SERPL-MCNC: 8.7 MG/DL — SIGNIFICANT CHANGE UP (ref 8.4–10.5)
CHLORIDE SERPL-SCNC: 99 MMOL/L — SIGNIFICANT CHANGE UP (ref 96–108)
CO2 SERPL-SCNC: 19 MMOL/L — LOW (ref 22–29)
CREAT SERPL-MCNC: 1.19 MG/DL — SIGNIFICANT CHANGE UP (ref 0.5–1.3)
EGFR: 49 ML/MIN/1.73M2 — LOW
GLUCOSE BLDC GLUCOMTR-MCNC: 313 MG/DL — HIGH (ref 70–99)
GLUCOSE SERPL-MCNC: 237 MG/DL — HIGH (ref 70–99)
HCT VFR BLD CALC: 29.4 % — LOW (ref 34.5–45)
HGB BLD-MCNC: 9.4 G/DL — LOW (ref 11.5–15.5)
MCHC RBC-ENTMCNC: 25.8 PG — LOW (ref 27–34)
MCHC RBC-ENTMCNC: 32 GM/DL — SIGNIFICANT CHANGE UP (ref 32–36)
MCV RBC AUTO: 80.8 FL — SIGNIFICANT CHANGE UP (ref 80–100)
MRSA PCR RESULT.: SIGNIFICANT CHANGE UP
PLATELET # BLD AUTO: 639 K/UL — HIGH (ref 150–400)
POTASSIUM SERPL-MCNC: 5 MMOL/L — SIGNIFICANT CHANGE UP (ref 3.5–5.3)
POTASSIUM SERPL-SCNC: 5 MMOL/L — SIGNIFICANT CHANGE UP (ref 3.5–5.3)
RBC # BLD: 3.64 M/UL — LOW (ref 3.8–5.2)
RBC # FLD: 17.7 % — HIGH (ref 10.3–14.5)
S AUREUS DNA NOSE QL NAA+PROBE: SIGNIFICANT CHANGE UP
SODIUM SERPL-SCNC: 133 MMOL/L — LOW (ref 135–145)
WBC # BLD: 14.03 K/UL — HIGH (ref 3.8–10.5)
WBC # FLD AUTO: 14.03 K/UL — HIGH (ref 3.8–10.5)

## 2024-10-14 PROCEDURE — 99231 SBSQ HOSP IP/OBS SF/LOW 25: CPT

## 2024-10-14 PROCEDURE — 99239 HOSP IP/OBS DSCHRG MGMT >30: CPT

## 2024-10-14 RX ORDER — ISOSORBIDE MONONITRATE 30 MG
1 TABLET, EXTENDED RELEASE 24 HR ORAL
Qty: 30 | Refills: 0
Start: 2024-10-14 | End: 2024-11-12

## 2024-10-14 RX ORDER — ISOSORBIDE MONONITRATE 30 MG
60 TABLET, EXTENDED RELEASE 24 HR ORAL DAILY
Refills: 0 | Status: DISCONTINUED | OUTPATIENT
Start: 2024-10-14 | End: 2024-10-14

## 2024-10-14 RX ORDER — RANOLAZINE 500 MG/1
1 TABLET, EXTENDED RELEASE ORAL
Qty: 60 | Refills: 0
Start: 2024-10-14 | End: 2024-11-12

## 2024-10-14 RX ORDER — CHLORHEXIDINE GLUCONATE ORAL RINSE 1.2 MG/ML
1 SOLUTION DENTAL
Refills: 0 | Status: DISCONTINUED | OUTPATIENT
Start: 2024-10-14 | End: 2024-10-14

## 2024-10-14 RX ORDER — FUROSEMIDE 10 MG/ML
1 INJECTION INTRAVENOUS
Qty: 30 | Refills: 0
Start: 2024-10-14 | End: 2024-11-12

## 2024-10-14 RX ADMIN — RANOLAZINE 500 MILLIGRAM(S): 500 TABLET, EXTENDED RELEASE ORAL at 05:13

## 2024-10-14 RX ADMIN — Medication 75 MILLIGRAM(S): at 11:21

## 2024-10-14 RX ADMIN — PANTOPRAZOLE SODIUM 20 MILLIGRAM(S): 40 TABLET, DELAYED RELEASE ORAL at 05:13

## 2024-10-14 RX ADMIN — CHLORHEXIDINE GLUCONATE ORAL RINSE 1 APPLICATION(S): 1.2 SOLUTION DENTAL at 05:12

## 2024-10-14 RX ADMIN — Medication 8: at 09:51

## 2024-10-14 RX ADMIN — Medication 5000 UNIT(S): at 05:12

## 2024-10-14 RX ADMIN — Medication 60 MILLIGRAM(S): at 11:20

## 2024-10-14 RX ADMIN — Medication 81 MILLIGRAM(S): at 11:20

## 2024-10-14 RX ADMIN — Medication 100 MILLIGRAM(S): at 05:13

## 2024-10-14 RX ADMIN — Medication 325 MILLIGRAM(S): at 05:13

## 2024-10-14 NOTE — DISCHARGE NOTE PROVIDER - HOSPITAL COURSE
74 year old female with a past medical history of CAD s/p PCI, PAD with chronic RLE wound s/p debridement on 07/09, HFpEF, HTN, cardiac pacemaker for Mobitz type II, CVA who is admitted due to CHF exacerbation and need for intravenous diuresis. Pt was started on lasix 40mg IV bid, later held due to BRADY. Cardiology was consulted 74 year old female with a past medical history of CAD s/p PCI, PAD with chronic RLE wound s/p debridement on 07/09, HFpEF, HTN, cardiac pacemaker for Mobitz type II, CVA who is admitted due to CHF exacerbation due to medication non-compliance. Pt was started on lasix 40mg IV bid. Cardiology was consulted. Pt initially required NC now on room air. She also developed diuresis induced BRADY, lasix were being held. She also received gentle hydration per renal recommendation with improvement in Hyponatremia and BRADY. Pt is cleared for dc on Lasix 20mg oral qd. To fu with Cardiology and Nephrology. Pt was initially started on Ceftriaxone for UTI, Urine cx grew ESBL ecoli, ID was consulted and recommended to stop abx since pt never had symptoms during this admission and this might be colonization. Her initial back pain may have been related to passed kidney stone as per CT scan. - - -

## 2024-10-14 NOTE — DISCHARGE NOTE NURSING/CASE MANAGEMENT/SOCIAL WORK - NSDCPEFALRISK_GEN_ALL_CORE
For information on Fall & Injury Prevention, visit: [email or web address] OR  [email or web address] OR  https://www.cdc.gov/steadi/patient.html

## 2024-10-14 NOTE — PROGRESS NOTE ADULT - SUBJECTIVE AND OBJECTIVE BOX
Mariela Nelson MD (Available on [employer]  [facility] Division of Hospital Medicine    Chief Complaint:  sob    SUBJECTIVE / OVERNIGHT EVENTS:  Pt seen and examined at bedside. Denies any complaints. pt and  updated    Patient denies chest pain, SOB, abd pain, N/V, fever, chills, dysuria or any other complaints. All remainder ROS negative.     INTERVAL EVENTS:  -Pt hemodynamically stable with no overnight events  -s/p lasix 40mg one dose. Cr improved but Na-129 this Am and K-5.4. Spoke to Dr Canchola will trial IVF today and hold off lasix   -Fu renal bladder usg  -Appreciate ID recs, Urine cx 10/8 with ESBL ecoli and pt received Ceftriaxone for 5 days, however since pt is asymptomatic recs to dc the meropenem and no need to treat asymptomatic bacturia       MEDICATIONS  (STANDING):  aspirin  chewable 81 milliGRAM(s) Oral daily  atorvastatin 80 milliGRAM(s) Oral at bedtime  clopidogrel Tablet 75 milliGRAM(s) Oral daily  dextrose 5%. 1000 milliLiter(s) (100 mL/Hr) IV Continuous <Continuous>  ferrous    sulfate 325 milliGRAM(s) Oral three times a day  glucagon  Injectable 1 milliGRAM(s) IntraMuscular once  heparin   Injectable 5000 Unit(s) SubCutaneous every 12 hours  insulin lispro (ADMELOG) corrective regimen sliding scale   SubCutaneous three times a day before meals  isosorbide   mononitrate ER Tablet (IMDUR) 30 milliGRAM(s) Oral daily  metoprolol succinate  milliGRAM(s) Oral daily  pantoprazole    Tablet 20 milliGRAM(s) Oral before breakfast  ranolazine 500 milliGRAM(s) Oral two times a day  sodium chloride 0.9%. 1000 milliLiter(s) (75 mL/Hr) IV Continuous <Continuous>    MEDICATIONS  (PRN):  dextrose Oral Gel 15 Gram(s) Oral once PRN Blood Glucose LESS THAN 70 milliGRAM(s)/deciliter  melatonin 3 milliGRAM(s) Oral at bedtime PRN Insomnia  metoprolol tartrate Injectable 2.5 milliGRAM(s) IV Push every 6 hours PRN SBP > 160        I&O's Summary    12 Oct 2024 07:01  -  13 Oct 2024 07:00  --------------------------------------------------------  IN: 972 mL / OUT: 2800 mL / NET: -1828 mL    13 Oct 2024 07:01  -  13 Oct 2024 11:45  --------------------------------------------------------  IN: 0 mL / OUT: 600 mL / NET: -600 mL        PHYSICAL EXAM:  Vital Signs Last 24 Hrs  T(C): 36.7 (13 Oct 2024 09:19), Max: 36.7 (13 Oct 2024 00:24)  T(F): 98.1 (13 Oct 2024 09:19), Max: 98.1 (13 Oct 2024 09:19)  HR: 65 (13 Oct 2024 09:19) (65 - 70)  BP: 162/72 (13 Oct 2024 09:19) (110/65 - 176/69)  BP(mean): 105 (13 Oct 2024 05:00) (80 - 105)  RR: 18 (13 Oct 2024 09:19) (15 - 18)  SpO2: 97% (13 Oct 2024 09:19) (97% - 100%)    Parameters below as of 13 Oct 2024 09:19  Patient On (Oxygen Delivery Method): room air            CONSTITUTIONAL: NAD  ENMT: Moist oral mucosa  RESPIRATORY: Normal respiratory effort; lungs are clear to auscultation bilaterally  CARDIOVASCULAR: Regular rate and rhythm, normal S1 and S2, no murmur/rub/gallop  ABDOMEN: Nontender to palpation, normoactive bowel sounds, no rebound/guarding; No hepatosplenomegaly  EXTREMITIES: No LE swelling   PSYCH: A+O to person, place, and time; affect appropriate  NEUROLOGY: CN 2-12 are intact and symmetric; no gross sensory deficits;   SKIN: No rashes; no palpable lesions    LABS:                        9.2    15.09 )-----------( 673      ( 13 Oct 2024 04:52 )             28.3     10-13    129[L]  |  98  |  40.5[H]  ----------------------------<  252[H]  5.4[H]   |  17.0[L]  |  1.25    Ca    8.9      13 Oct 2024 04:52  Phos  4.5     10-12  Mg     2.8     10-12            Urinalysis Basic - ( 13 Oct 2024 04:52 )    Color: x / Appearance: x / SG: x / pH: x  Gluc: 252 mg/dL / Ketone: x  / Bili: x / Urobili: x   Blood: x / Protein: x / Nitrite: x   Leuk Esterase: x / RBC: x / WBC x   Sq Epi: x / Non Sq Epi: x / Bacteria: x        CAPILLARY BLOOD GLUCOSE      POCT Blood Glucose.: 312 mg/dL (13 Oct 2024 08:59)  POCT Blood Glucose.: 153 mg/dL (12 Oct 2024 17:52)  POCT Blood Glucose.: 282 mg/dL (12 Oct 2024 13:23)        RADIOLOGY & ADDITIONAL TESTS:  Results Reviewed:   Imaging Personally Reviewed:  Electrocardiogram Personally Reviewed:                                          
Reason for visit: Acute kidney injury    Subjective: Patient was seen in her room earlier today. Denies shortness of breath or urinary complaints.  Review of systems: All systems were reviewed in detail, pertinent positive and negative have been mentioned above, otherwise negative.    Physical Exam:  Gen: no acute distress  MS: alert, conversing normally  Eyes: EOMI, no icterus  HENT: NCAT, MMM, NC+  CV: rhythm reg reg, rate normal, no m/g/r, no LE edema  Chest: CTAB, no w/r/r,  Abd: soft, NT, ND  Neuro: moving all 4 limbs spontaneously, no tremor  MSK: Dressing RLE+  Skin: dry, warm, no rash or jaundice    Vital Signs Last 24 Hrs  T(C): 36.8 (13 Oct 2024 15:39), Max: 36.8 (13 Oct 2024 15:39)  T(F): 98.2 (13 Oct 2024 15:39), Max: 98.2 (13 Oct 2024 15:39)  HR: 71 (13 Oct 2024 15:39) (65 - 71)  BP: 151/70 (13 Oct 2024 15:39) (136/65 - 176/69)  BP(mean): 97 (13 Oct 2024 15:39) (88 - 105)  RR: 16 (13 Oct 2024 15:39) (16 - 18)  SpO2: 99% (13 Oct 2024 15:39) (97% - 100%)  Parameters below as of 13 Oct 2024 15:39  Patient On (Oxygen Delivery Method): room air      I&O's Summary    12 Oct 2024 07:01  -  13 Oct 2024 07:00  --------------------------------------------------------  IN: 972 mL / OUT: 2800 mL / NET: -1828 mL    13 Oct 2024 07:01  -  13 Oct 2024 20:24  --------------------------------------------------------  IN: 1473 mL / OUT: 1300 mL / NET: 173 mL      Current Antibiotics:    Other medications:  aspirin  chewable 81 milliGRAM(s) Oral daily  atorvastatin 80 milliGRAM(s) Oral at bedtime  clopidogrel Tablet 75 milliGRAM(s) Oral daily  dextrose 5%. 1000 milliLiter(s) IV Continuous <Continuous>  ferrous    sulfate 325 milliGRAM(s) Oral three times a day  glucagon  Injectable 1 milliGRAM(s) IntraMuscular once  heparin   Injectable 5000 Unit(s) SubCutaneous every 12 hours  insulin lispro (ADMELOG) corrective regimen sliding scale   SubCutaneous three times a day before meals  isosorbide   mononitrate ER Tablet (IMDUR) 30 milliGRAM(s) Oral daily  metoprolol succinate  milliGRAM(s) Oral daily  pantoprazole    Tablet 20 milliGRAM(s) Oral before breakfast  ranolazine 500 milliGRAM(s) Oral two times a day    10-13    129[L]  |  98  |  40.5[H]  ----------------------------<  252[H]  5.4[H]   |  17.0[L]  |  1.25    Ca    8.9      13 Oct 2024 04:52  Phos  4.5     10-12  Mg     2.8     10-12                        9.2    15.09 )-----------( 673      ( 13 Oct 2024 04:52 )             28.3       Creatinine: 1.25 mg/dL (10-13-24 @ 04:52)  Creatinine: 1.81 mg/dL (10-12-24 @ 05:15)  Creatinine: 1.88 mg/dL (10-11-24 @ 04:55)  Creatinine: 1.74 mg/dL (10-10-24 @ 05:16)  Creatinine: 1.52 mg/dL (10-09-24 @ 04:43)        
Reason for visit: Acute kidney injury    Subjective: Patient was seen in her room, family at bedside.  Patient was sitting on recliner and reports feeling better.  Denies shortness of breath or urinary complaints.  Review of systems: All systems were reviewed in detail, pertinent positive and negative have been mentioned above, otherwise negative.    Physical Exam:  Gen: no acute distress  MS: alert, conversing normally  Eyes: EOMI, no icterus  HENT: NCAT, MMM, NC+  CV: rhythm reg reg, rate normal, no m/g/r, no LE edema  Chest: CTAB, no w/r/r,  Abd: soft, NT, ND  Neuro: moving all 4 limbs spontaneously, no tremor  MSK: Dressing RLE+  Skin: dry, warm, no rash or jaundice    Vital Signs Last 24 Hrs  T(C): 36.7 (12 Oct 2024 08:45), Max: 36.8 (12 Oct 2024 05:06)  T(F): 98 (12 Oct 2024 08:45), Max: 98.2 (12 Oct 2024 05:06)  HR: 68 (12 Oct 2024 08:45) (61 - 68)  BP: 145/90 (12 Oct 2024 08:45) (112/68 - 147/83)  BP(mean): 105 (11 Oct 2024 20:48) (83 - 105)  RR: 18 (12 Oct 2024 08:45) (18 - 18)  SpO2: 99% (12 Oct 2024 08:45) (94% - 99%)  Parameters below as of 12 Oct 2024 08:45  Patient On (Oxygen Delivery Method): room air      I&O's Summary    11 Oct 2024 07:01  -  12 Oct 2024 07:00  --------------------------------------------------------  IN: 720 mL / OUT: 200 mL / NET: 520 mL      Current Antibiotics:  cefTRIAXone Injectable. 1000 milliGRAM(s) IV Push every 24 hours    Other medications:  aspirin  chewable 81 milliGRAM(s) Oral daily  atorvastatin 80 milliGRAM(s) Oral at bedtime  clopidogrel Tablet 75 milliGRAM(s) Oral daily  dextrose 5%. 1000 milliLiter(s) IV Continuous <Continuous>  ferrous    sulfate 325 milliGRAM(s) Oral three times a day  furosemide    Tablet 40 milliGRAM(s) Oral daily  glucagon  Injectable 1 milliGRAM(s) IntraMuscular once  heparin   Injectable 5000 Unit(s) SubCutaneous every 12 hours  insulin lispro (ADMELOG) corrective regimen sliding scale   SubCutaneous three times a day before meals  isosorbide   mononitrate ER Tablet (IMDUR) 30 milliGRAM(s) Oral daily  metoprolol succinate  milliGRAM(s) Oral daily  pantoprazole    Tablet 20 milliGRAM(s) Oral before breakfast  ranolazine 500 milliGRAM(s) Oral two times a day    10-12    131[L]  |  99  |  41.7[H]  ----------------------------<  257[H]  5.3   |  19.0[L]  |  1.81[H]    Ca    8.8      12 Oct 2024 05:15  Phos  4.5     10-12  Mg     2.8     10-12      Creatinine: 1.81 mg/dL (10-12-24 @ 05:15)  Creatinine: 1.88 mg/dL (10-11-24 @ 04:55)  Creatinine: 1.74 mg/dL (10-10-24 @ 05:16)  Creatinine: 1.52 mg/dL (10-09-24 @ 04:43)  Creatinine: 1.49 mg/dL (10-08-24 @ 16:22)  Creatinine: 1.41 mg/dL (10-08-24 @ 11:18)    
[facility] Division of Hospital Medicine  Helder Hightower,   I'm reachable on [employer]    Patient is a 74y old  Female who presents with a chief complaint of Shortness of breath (09 Oct 2024 10:49)      SUBJECTIVE / OVERNIGHT EVENTS:  Patient was seen at bedside resting comfortably. She was in no acute distress and had no complaints at this time. Creatinine continues to trend up today. Case was discussed with Dr. Zuñiga (Nephro) who agrees to holding IV Lasix . GI was consulted yesterday due to downtrending Hb and recommend outpatient follow up for colonoscopy since she has not had a recent luminal evaluation.    MEDICATIONS  (STANDING):  amLODIPine   Tablet 10 milliGRAM(s) Oral once  aspirin  chewable 81 milliGRAM(s) Oral daily  atorvastatin 80 milliGRAM(s) Oral at bedtime  clopidogrel Tablet 75 milliGRAM(s) Oral daily  dextrose 5%. 1000 milliLiter(s) (100 mL/Hr) IV Continuous <Continuous>  ferrous    sulfate 325 milliGRAM(s) Oral three times a day  furosemide   Injectable 40 milliGRAM(s) IV Push two times a day  gabapentin 800 milliGRAM(s) Oral Once  glucagon  Injectable 1 milliGRAM(s) IntraMuscular once  heparin   Injectable 5000 Unit(s) SubCutaneous every 12 hours  insulin lispro (ADMELOG) corrective regimen sliding scale   SubCutaneous three times a day before meals  isosorbide   mononitrate ER Tablet (IMDUR) 30 milliGRAM(s) Oral daily  metoprolol succinate  milliGRAM(s) Oral daily  pantoprazole    Tablet 20 milliGRAM(s) Oral before breakfast  ranolazine 500 milliGRAM(s) Oral two times a day    MEDICATIONS  (PRN):  dextrose Oral Gel 15 Gram(s) Oral once PRN Blood Glucose LESS THAN 70 milliGRAM(s)/deciliter  melatonin 3 milliGRAM(s) Oral at bedtime PRN Insomnia    CAPILLARY BLOOD GLUCOSE      POCT Blood Glucose.: 93 mg/dL (09 Oct 2024 07:47)  POCT Blood Glucose.: 227 mg/dL (08 Oct 2024 21:43)  POCT Blood Glucose.: 211 mg/dL (08 Oct 2024 18:05)  POCT Blood Glucose.: 176 mg/dL (08 Oct 2024 14:41)  POCT Blood Glucose.: 209 mg/dL (08 Oct 2024 13:15)    I&O's Summary      PHYSICAL EXAM:  Vital Signs Last 24 Hrs  T(C): 36.4 (09 Oct 2024 11:15), Max: 37.2 (08 Oct 2024 13:03)  T(F): 97.6 (09 Oct 2024 11:15), Max: 99 (08 Oct 2024 13:03)  HR: 67 (09 Oct 2024 11:15) (59 - 83)  BP: 174/76 (09 Oct 2024 11:15) (135/70 - 175/76)  BP(mean): 89 (08 Oct 2024 13:46) (89 - 89)  RR: 18 (09 Oct 2024 11:15) (18 - 28)  SpO2: 96% (09 Oct 2024 11:15) (95% - 100%)    Parameters below as of 09 Oct 2024 11:15  Patient On (Oxygen Delivery Method): nasal cannula        CONSTITUTIONAL: NAD, well-developed, well-groomed  EYES:  EOMI, conjunctiva and sclera clear  ENMT: Moist oral mucosa  NECK: Supple, no JVD  RESPIRATORY: Normal respiratory effort; positive for mile crackles  CARDIOVASCULAR: Regular rate and rhythm, normal S1 and S2, no murmur/rub/gallop; No lower extremity edema  ABDOMEN: normoactive bowel sounds, soft, nontender to palpation, no distension   MUSCULOSKELETAL:  no clubbing or cyanosis of digits; no joint swelling or tenderness to palpation  PSYCH: A+O x3; affect appropriate, calm and cooperative  NEUROLOGY: CN 2-12 are intact and symmetric; no gross sensory deficits     LABS:                        8.1    13.02 )-----------( 590      ( 09 Oct 2024 04:43 )             26.0     10-09    130[L]  |  95[L]  |  38.1[H]  ----------------------------<  82  4.7   |  21.0[L]  |  1.52[H]    Ca    8.8      09 Oct 2024 04:43  Phos  3.5     10-09  Mg     2.2     10-09    TPro  7.7  /  Alb  3.8  /  TBili  0.3[L]  /  DBili  x   /  AST  11  /  ALT  11  /  AlkPhos  89  10-09          Urinalysis Basic - ( 09 Oct 2024 04:43 )    Color: x / Appearance: x / SG: x / pH: x  Gluc: 82 mg/dL / Ketone: x  / Bili: x / Urobili: x   Blood: x / Protein: x / Nitrite: x   Leuk Esterase: x / RBC: x / WBC x   Sq Epi: x / Non Sq Epi: x / Bacteria: x      
Reason for visit: Acute kidney injury    Subjective: Patient was seen in her room earlier today. Denies shortness of breath or urinary complaints.  Review of systems: All systems were reviewed in detail, pertinent positive and negative have been mentioned above, otherwise negative.    Physical Exam:  Gen: no acute distress  MS: alert, conversing normally  Eyes: EOMI, no icterus  HENT: NCAT, MMM, NC+  CV: rhythm reg reg, rate normal, no m/g/r, no LE edema  Chest: CTAB, no w/r/r,  Abd: soft, NT, ND  Neuro: moving all 4 limbs spontaneously, no tremor  MSK: Dressing RLE+  Skin: dry, warm, no rash or jaundice    Vital Signs Last 24 Hrs  T(C): 36.7 (14 Oct 2024 12:04), Max: 36.8 (14 Oct 2024 05:10)  T(F): 98.1 (14 Oct 2024 12:04), Max: 98.2 (14 Oct 2024 05:10)  HR: 77 (14 Oct 2024 12:04) (63 - 78)  BP: 145/68 (14 Oct 2024 12:04) (145/68 - 175/72)  BP(mean): 106 (13 Oct 2024 21:19) (106 - 106)  RR: 18 (14 Oct 2024 12:04) (16 - 18)  SpO2: 98% (14 Oct 2024 12:04) (96% - 98%)    Parameters below as of 14 Oct 2024 12:04  Patient On (Oxygen Delivery Method): room air      I&O's Summary    13 Oct 2024 07:01  -  14 Oct 2024 07:00  --------------------------------------------------------  IN: 1698 mL / OUT: 4200 mL / NET: -2502 mL    14 Oct 2024 07:01  -  14 Oct 2024 16:25  --------------------------------------------------------  IN: 222 mL / OUT: 300 mL / NET: -78 mL      Current Antibiotics:    Other medications:  aspirin  chewable 81 milliGRAM(s) Oral daily  atorvastatin 80 milliGRAM(s) Oral at bedtime  chlorhexidine 2% Cloths 1 Application(s) Topical <User Schedule>  clopidogrel Tablet 75 milliGRAM(s) Oral daily  dextrose 5%. 1000 milliLiter(s) IV Continuous <Continuous>  ferrous    sulfate 325 milliGRAM(s) Oral three times a day  glucagon  Injectable 1 milliGRAM(s) IntraMuscular once  heparin   Injectable 5000 Unit(s) SubCutaneous every 12 hours  insulin lispro (ADMELOG) corrective regimen sliding scale   SubCutaneous three times a day before meals  isosorbide   mononitrate ER Tablet (IMDUR) 60 milliGRAM(s) Oral daily  metoprolol succinate  milliGRAM(s) Oral daily  pantoprazole    Tablet 20 milliGRAM(s) Oral before breakfast  ranolazine 500 milliGRAM(s) Oral two times a day    10-14    133[L]  |  99  |  28.0[H]  ----------------------------<  237[H]  5.0   |  19.0[L]  |  1.19    Ca    8.7      14 Oct 2024 04:51      Creatinine: 1.19 mg/dL (10-14-24 @ 04:51)  Creatinine: 1.25 mg/dL (10-13-24 @ 04:52)  Creatinine: 1.81 mg/dL (10-12-24 @ 05:15)  Creatinine: 1.88 mg/dL (10-11-24 @ 04:55)  Creatinine: 1.74 mg/dL (10-10-24 @ 05:16)            
[facility] Division of Hospital Medicine  Helder Hightower,   I'm reachable on [employer]    Patient is a 74y old  Female who presents with a chief complaint of Shortness of breath (09 Oct 2024 10:49)      SUBJECTIVE / OVERNIGHT EVENTS:  Patient was seen at bedside resting comfortably. She was in no acute distress and had no complaints at this time. She was saturating well on 2L Nc cannula. Nephrology and Cardiology are following and recommend holding valsartan as well today. Creatinine is uptrending today    MEDICATIONS  (STANDING):  amLODIPine   Tablet 10 milliGRAM(s) Oral once  aspirin  chewable 81 milliGRAM(s) Oral daily  atorvastatin 80 milliGRAM(s) Oral at bedtime  clopidogrel Tablet 75 milliGRAM(s) Oral daily  dextrose 5%. 1000 milliLiter(s) (100 mL/Hr) IV Continuous <Continuous>  ferrous    sulfate 325 milliGRAM(s) Oral three times a day  furosemide   Injectable 40 milliGRAM(s) IV Push two times a day  gabapentin 800 milliGRAM(s) Oral Once  glucagon  Injectable 1 milliGRAM(s) IntraMuscular once  heparin   Injectable 5000 Unit(s) SubCutaneous every 12 hours  insulin lispro (ADMELOG) corrective regimen sliding scale   SubCutaneous three times a day before meals  isosorbide   mononitrate ER Tablet (IMDUR) 30 milliGRAM(s) Oral daily  metoprolol succinate  milliGRAM(s) Oral daily  pantoprazole    Tablet 20 milliGRAM(s) Oral before breakfast  ranolazine 500 milliGRAM(s) Oral two times a day    MEDICATIONS  (PRN):  dextrose Oral Gel 15 Gram(s) Oral once PRN Blood Glucose LESS THAN 70 milliGRAM(s)/deciliter  melatonin 3 milliGRAM(s) Oral at bedtime PRN Insomnia    CAPILLARY BLOOD GLUCOSE      POCT Blood Glucose.: 93 mg/dL (09 Oct 2024 07:47)  POCT Blood Glucose.: 227 mg/dL (08 Oct 2024 21:43)  POCT Blood Glucose.: 211 mg/dL (08 Oct 2024 18:05)  POCT Blood Glucose.: 176 mg/dL (08 Oct 2024 14:41)  POCT Blood Glucose.: 209 mg/dL (08 Oct 2024 13:15)    I&O's Summary      PHYSICAL EXAM:  Vital Signs Last 24 Hrs  T(C): 36.4 (09 Oct 2024 11:15), Max: 37.2 (08 Oct 2024 13:03)  T(F): 97.6 (09 Oct 2024 11:15), Max: 99 (08 Oct 2024 13:03)  HR: 67 (09 Oct 2024 11:15) (59 - 83)  BP: 174/76 (09 Oct 2024 11:15) (135/70 - 175/76)  BP(mean): 89 (08 Oct 2024 13:46) (89 - 89)  RR: 18 (09 Oct 2024 11:15) (18 - 28)  SpO2: 96% (09 Oct 2024 11:15) (95% - 100%)    Parameters below as of 09 Oct 2024 11:15  Patient On (Oxygen Delivery Method): nasal cannula        CONSTITUTIONAL: NAD, well-developed, well-groomed  EYES:  EOMI, conjunctiva and sclera clear  ENMT: Moist oral mucosa  NECK: Supple, no JVD  RESPIRATORY: Normal respiratory effort; positive for mile crackles  CARDIOVASCULAR: Regular rate and rhythm, normal S1 and S2, no murmur/rub/gallop; No lower extremity edema  ABDOMEN: normoactive bowel sounds, soft, nontender to palpation, no distension   MUSCULOSKELETAL:  no clubbing or cyanosis of digits; no joint swelling or tenderness to palpation  PSYCH: A+O x3; affect appropriate, calm and cooperative  NEUROLOGY: CN 2-12 are intact and symmetric; no gross sensory deficits     LABS:                        8.1    13.02 )-----------( 590      ( 09 Oct 2024 04:43 )             26.0     10-09    130[L]  |  95[L]  |  38.1[H]  ----------------------------<  82  4.7   |  21.0[L]  |  1.52[H]    Ca    8.8      09 Oct 2024 04:43  Phos  3.5     10-09  Mg     2.2     10-09    TPro  7.7  /  Alb  3.8  /  TBili  0.3[L]  /  DBili  x   /  AST  11  /  ALT  11  /  AlkPhos  89  10-09          Urinalysis Basic - ( 09 Oct 2024 04:43 )    Color: x / Appearance: x / SG: x / pH: x  Gluc: 82 mg/dL / Ketone: x  / Bili: x / Urobili: x   Blood: x / Protein: x / Nitrite: x   Leuk Esterase: x / RBC: x / WBC x   Sq Epi: x / Non Sq Epi: x / Bacteria: x      
[facility] Division of Hospital Medicine  Helder Hightower, DO  I'm reachable on [employer]    Patient is a 74y old  Female who presents with a chief complaint of Shortness of breath (09 Oct 2024 10:49)      SUBJECTIVE / OVERNIGHT EVENTS:  Patient was seen at bedside resting comfortably. She was in no acute distress and had no complaints at this time. Creatinine continues to trend up today. Case was discussed with Dr. Zuñiga (Nephro) who agrees to holding IV Lasix today. Patient's son was called to provide an update and the need to keep the patient here longer and he is agreeing. GI was consulted yesterday due to downtrending Hb and recommend outpatient follow up for colonoscopy since she has not had a recent luminal evaluation.    MEDICATIONS  (STANDING):  amLODIPine   Tablet 10 milliGRAM(s) Oral once  aspirin  chewable 81 milliGRAM(s) Oral daily  atorvastatin 80 milliGRAM(s) Oral at bedtime  clopidogrel Tablet 75 milliGRAM(s) Oral daily  dextrose 5%. 1000 milliLiter(s) (100 mL/Hr) IV Continuous <Continuous>  ferrous    sulfate 325 milliGRAM(s) Oral three times a day  furosemide   Injectable 40 milliGRAM(s) IV Push two times a day  gabapentin 800 milliGRAM(s) Oral Once  glucagon  Injectable 1 milliGRAM(s) IntraMuscular once  heparin   Injectable 5000 Unit(s) SubCutaneous every 12 hours  insulin lispro (ADMELOG) corrective regimen sliding scale   SubCutaneous three times a day before meals  isosorbide   mononitrate ER Tablet (IMDUR) 30 milliGRAM(s) Oral daily  metoprolol succinate  milliGRAM(s) Oral daily  pantoprazole    Tablet 20 milliGRAM(s) Oral before breakfast  ranolazine 500 milliGRAM(s) Oral two times a day    MEDICATIONS  (PRN):  dextrose Oral Gel 15 Gram(s) Oral once PRN Blood Glucose LESS THAN 70 milliGRAM(s)/deciliter  melatonin 3 milliGRAM(s) Oral at bedtime PRN Insomnia    CAPILLARY BLOOD GLUCOSE      POCT Blood Glucose.: 93 mg/dL (09 Oct 2024 07:47)  POCT Blood Glucose.: 227 mg/dL (08 Oct 2024 21:43)  POCT Blood Glucose.: 211 mg/dL (08 Oct 2024 18:05)  POCT Blood Glucose.: 176 mg/dL (08 Oct 2024 14:41)  POCT Blood Glucose.: 209 mg/dL (08 Oct 2024 13:15)    I&O's Summary      PHYSICAL EXAM:  Vital Signs Last 24 Hrs  T(C): 36.4 (09 Oct 2024 11:15), Max: 37.2 (08 Oct 2024 13:03)  T(F): 97.6 (09 Oct 2024 11:15), Max: 99 (08 Oct 2024 13:03)  HR: 67 (09 Oct 2024 11:15) (59 - 83)  BP: 174/76 (09 Oct 2024 11:15) (135/70 - 175/76)  BP(mean): 89 (08 Oct 2024 13:46) (89 - 89)  RR: 18 (09 Oct 2024 11:15) (18 - 28)  SpO2: 96% (09 Oct 2024 11:15) (95% - 100%)    Parameters below as of 09 Oct 2024 11:15  Patient On (Oxygen Delivery Method): nasal cannula        CONSTITUTIONAL: NAD, well-developed, well-groomed  EYES:  EOMI, conjunctiva and sclera clear  ENMT: Moist oral mucosa  NECK: Supple, no JVD  RESPIRATORY: Normal respiratory effort; positive for mile crackles  CARDIOVASCULAR: Regular rate and rhythm, normal S1 and S2, no murmur/rub/gallop; No lower extremity edema  ABDOMEN: normoactive bowel sounds, soft, nontender to palpation, no distension   MUSCULOSKELETAL:  no clubbing or cyanosis of digits; no joint swelling or tenderness to palpation  PSYCH: A+O x3; affect appropriate, calm and cooperative  NEUROLOGY: CN 2-12 are intact and symmetric; no gross sensory deficits     LABS:                        8.1    13.02 )-----------( 590      ( 09 Oct 2024 04:43 )             26.0     10-09    130[L]  |  95[L]  |  38.1[H]  ----------------------------<  82  4.7   |  21.0[L]  |  1.52[H]    Ca    8.8      09 Oct 2024 04:43  Phos  3.5     10-09  Mg     2.2     10-09    TPro  7.7  /  Alb  3.8  /  TBili  0.3[L]  /  DBili  x   /  AST  11  /  ALT  11  /  AlkPhos  89  10-09          Urinalysis Basic - ( 09 Oct 2024 04:43 )    Color: x / Appearance: x / SG: x / pH: x  Gluc: 82 mg/dL / Ketone: x  / Bili: x / Urobili: x   Blood: x / Protein: x / Nitrite: x   Leuk Esterase: x / RBC: x / WBC x   Sq Epi: x / Non Sq Epi: x / Bacteria: x      
Mariela Nelson MD (Available on [employer])  [facility] Division of Hospital Medicine    Chief Complaint:  Sob    SUBJECTIVE / OVERNIGHT EVENTS:  Pt seen and examined at bedside. Denies any complaints at this time.  at bedside.     Patient denies chest pain, SOB, abd pain, N/V, fever, chills, dysuria or any other complaints. All remainder ROS negative.     INTERVAL EVENTS:  -Pt hemodynamically stable with no overnight events  -Cr-1.81<<1.88<<1.7. Will give a dose of lasix 40mg oral once and see Cr in Am. Discussed with Dr Canchola who agrees  -Ceftriaxone IV for UTI. fu urine cx final     MEDICATIONS  (STANDING):  aspirin  chewable 81 milliGRAM(s) Oral daily  atorvastatin 80 milliGRAM(s) Oral at bedtime  cefTRIAXone Injectable. 1000 milliGRAM(s) IV Push every 24 hours  clopidogrel Tablet 75 milliGRAM(s) Oral daily  dextrose 5%. 1000 milliLiter(s) (100 mL/Hr) IV Continuous <Continuous>  ferrous    sulfate 325 milliGRAM(s) Oral three times a day  furosemide    Tablet 40 milliGRAM(s) Oral daily  glucagon  Injectable 1 milliGRAM(s) IntraMuscular once  heparin   Injectable 5000 Unit(s) SubCutaneous every 12 hours  insulin lispro (ADMELOG) corrective regimen sliding scale   SubCutaneous three times a day before meals  isosorbide   mononitrate ER Tablet (IMDUR) 30 milliGRAM(s) Oral daily  metoprolol succinate  milliGRAM(s) Oral daily  pantoprazole    Tablet 20 milliGRAM(s) Oral before breakfast  ranolazine 500 milliGRAM(s) Oral two times a day    MEDICATIONS  (PRN):  dextrose Oral Gel 15 Gram(s) Oral once PRN Blood Glucose LESS THAN 70 milliGRAM(s)/deciliter  melatonin 3 milliGRAM(s) Oral at bedtime PRN Insomnia  metoprolol tartrate Injectable 2.5 milliGRAM(s) IV Push every 6 hours PRN SBP > 160        I&O's Summary    11 Oct 2024 07:01  -  12 Oct 2024 07:00  --------------------------------------------------------  IN: 720 mL / OUT: 200 mL / NET: 520 mL        PHYSICAL EXAM:  Vital Signs Last 24 Hrs  T(C): 36.7 (12 Oct 2024 08:45), Max: 36.8 (12 Oct 2024 05:06)  T(F): 98 (12 Oct 2024 08:45), Max: 98.2 (12 Oct 2024 05:06)  HR: 68 (12 Oct 2024 08:45) (61 - 68)  BP: 145/90 (12 Oct 2024 08:45) (112/68 - 147/83)  BP(mean): 105 (11 Oct 2024 20:48) (83 - 105)  RR: 18 (12 Oct 2024 08:45) (18 - 18)  SpO2: 99% (12 Oct 2024 08:45) (94% - 99%)    Parameters below as of 12 Oct 2024 08:45  Patient On (Oxygen Delivery Method): room air            CONSTITUTIONAL: NAD  ENMT: Moist oral mucosa  RESPIRATORY: Normal respiratory effort; lungs are clear to auscultation bilaterally  CARDIOVASCULAR: Regular rate and rhythm, normal S1 and S2, no murmur/rub/gallop  ABDOMEN: Nontender to palpation, normoactive bowel sounds, no rebound/guarding; No hepatosplenomegaly  EXTREMITIES: No LE swelling   PSYCH: A+O to person, place, and time; affect appropriate  NEUROLOGY: CN 2-12 are intact and symmetric; no gross sensory deficits;   SKIN: No rashes; no palpable lesions    LABS:                        9.1    15.14 )-----------( 649      ( 12 Oct 2024 05:15 )             28.4     10-12    131[L]  |  99  |  41.7[H]  ----------------------------<  257[H]  5.3   |  19.0[L]  |  1.81[H]    Ca    8.8      12 Oct 2024 05:15  Phos  4.5     10-12  Mg     2.8     10-12            Urinalysis Basic - ( 12 Oct 2024 05:15 )    Color: x / Appearance: x / SG: x / pH: x  Gluc: 257 mg/dL / Ketone: x  / Bili: x / Urobili: x   Blood: x / Protein: x / Nitrite: x   Leuk Esterase: x / RBC: x / WBC x   Sq Epi: x / Non Sq Epi: x / Bacteria: x        CAPILLARY BLOOD GLUCOSE      POCT Blood Glucose.: 268 mg/dL (12 Oct 2024 09:09)  POCT Blood Glucose.: 228 mg/dL (11 Oct 2024 21:58)  POCT Blood Glucose.: 244 mg/dL (11 Oct 2024 17:07)  POCT Blood Glucose.: 294 mg/dL (11 Oct 2024 12:32)        RADIOLOGY & ADDITIONAL TESTS:  Results Reviewed:   Imaging Personally Reviewed:  Electrocardiogram Personally Reviewed:

## 2024-10-14 NOTE — DISCHARGE NOTE PROVIDER - CARE PROVIDERS DIRECT ADDRESSES
[email or web address]

## 2024-10-14 NOTE — DISCHARGE NOTE PROVIDER - NSDCMRMEDTOKEN_GEN_ALL_CORE_FT
amLODIPine 10 mg oral tablet: 1 tab(s) orally once a day  aspirin 81 mg oral delayed release tablet: 1 tab(s) orally once a day  atorvastatin 80 mg oral tablet: 1 tab(s) orally once a day (at bedtime)  clopidogrel 75 mg oral tablet: 1 tab(s) orally once a day  ferrous sulfate 325 mg (65 mg elemental iron) oral tablet: 1 tab(s) orally 3 times a day  gabapentin 300 mg oral capsule: 1 cap(s) orally once a day (at bedtime)  insulin glargine 100 units/mL subcutaneous solution: 30 unit(s) subcutaneous once a day (at bedtime)  isosorbide mononitrate 30 mg oral tablet, extended release: 1 tab(s) orally once a day  Lasix 20 mg oral tablet: 1 tab(s) orally once a day  metFORMIN 1000 mg oral tablet: 1 tab(s) orally 2 times a day (with meals)  metoprolol succinate 100 mg oral tablet, extended release: 1 tab(s) orally once a day  pantoprazole 40 mg oral delayed release tablet: 1 tab(s) orally once a day  ranolazine 500 mg oral tablet, extended release: 1 tab(s) orally 2 times a day   amLODIPine 10 mg oral tablet: 1 tab(s) orally once a day  aspirin 81 mg oral delayed release tablet: 1 tab(s) orally once a day  atorvastatin 80 mg oral tablet: 1 tab(s) orally once a day (at bedtime)  clopidogrel 75 mg oral tablet: 1 tab(s) orally once a day  ferrous sulfate 325 mg (65 mg elemental iron) oral tablet: 1 tab(s) orally 3 times a day  gabapentin 300 mg oral capsule: 1 cap(s) orally once a day (at bedtime)  insulin glargine 100 units/mL subcutaneous solution: 30 unit(s) subcutaneous once a day (at bedtime)  isosorbide mononitrate 60 mg oral tablet, extended release: 1 tab(s) orally once a day  Lasix 20 mg oral tablet: 1 tab(s) orally once a day  metFORMIN 1000 mg oral tablet: 1 tab(s) orally 2 times a day (with meals)  metoprolol succinate 100 mg oral tablet, extended release: 1 tab(s) orally once a day  pantoprazole 40 mg oral delayed release tablet: 1 tab(s) orally once a day  ranolazine 500 mg oral tablet, extended release: 1 tab(s) orally 2 times a day

## 2024-10-14 NOTE — PROGRESS NOTE ADULT - ASSESSMENT
73 YOF with HTN, CAD, PAD and HFpEF and UTI currently on Bactrim presenting with shortness of breath for 3 days in setting of discontinuing Lasix.  Nephrology consulted for BRADY    Acute kidney injury:  ·	Baseline serum creatinine 0.9-1 mg/deciliter  ·	Serum creatinine trend 1.4>1.49>1.52>1.74>1.88>1.81>1.25 mg/deciliter IMPROVING  ·	BRADY likely related to Bactrim use along with concomitant use of valsartan  ·	UA reviewed cloudy, large leukocyte esterase and pyuria  ·	Holding valsartan, discontinued Bactrim  ·	Diuretics can be resumed at half the dose (20  mg daily for now)    Hyperkalemia: BRADY, Bactrim, valsartan.  Hold valsartan, discontinue Bactrim.  Potassium has improved to 5.    Hyponatremia: Continue on fluid restriction of less than 2 L/day. Serum sodium improving 133 now.    Volume status: euvolemic.    Hypertension: Continue with Imdur 30 mg daily, metoprolol succinate 100 mg daily.  As above hold valsartan.    Anemia, unspecified: c/w ferrous sulphate 325 mg po TID.     Discussed with Dr. Nelson.

## 2024-10-14 NOTE — DISCHARGE NOTE PROVIDER - NSDCFUSCHEDAPPT_GEN_ALL_CORE_FT
[facility]  CARDIOLOGY [address] [facility]  Scheduled Appointment: 10/17/2024    Stanley Zamora  [facility]  PULMMED [address]  Scheduled Appointment: 11/05/2024    Fransisco Armendariz  [facility]  CARDIOLOGY [address]  Scheduled Appointment: 12/16/2024

## 2024-10-14 NOTE — DISCHARGE NOTE NURSING/CASE MANAGEMENT/SOCIAL WORK - PATIENT PORTAL LINK FT
You can access the FollowMyHealth Patient Portal offered by [facility] by registering at the following website: [email or web address]. By joining [employer]’s FollowMyHealth portal, you will also be able to view your health information using other applications (apps) compatible with our system.

## 2024-10-14 NOTE — DISCHARGE NOTE PROVIDER - ATTENDING DISCHARGE PHYSICAL EXAMINATION:
T(C): 36.7 (10-14-24 @ 08:39), Max: 36.8 (10-13-24 @ 15:39)  HR: 78 (10-14-24 @ 08:39) (63 - 78)  BP: 166/74 (10-14-24 @ 08:39) (151/70 - 175/72)  RR: 18 (10-14-24 @ 08:39) (16 - 18)  SpO2: 97% (10-14-24 @ 08:39) (96% - 99%)    CONSTITUTIONAL: Well groomed, no apparent distress  EYES: PERRLA and symmetric, EOMI, No conjunctival or scleral injection, non-icteric  ENMT: Oral mucosa with moist membranes. Normal dentition; no pharyngeal injection or exudates             NECK: Supple, symmetric and without tracheal deviation   RESP: No respiratory distress, no use of accessory muscles; CTA b/l, no WRR  CV: RRR, +S1S2, no MRG; no JVD; no peripheral edema  GI: Soft, NT, ND, no rebound, no guarding; no palpable masses; no hepatosplenomegaly; no hernia palpated  LYMPH: No cervical LAD or tenderness; no axillary LAD or tenderness; no inguinal LAD or tenderness  MSK: Normal gait; No digital clubbing or cyanosis; examination of the (head/neck/spine/ribs/pelvis, RUE, LUE, RLE, LLE) without misalignment,            Normal ROM without pain, no spinal tenderness, normal muscle strength/tone  SKIN: No rashes or ulcers noted; no subcutaneous nodules or induration palpable  NEURO: CN II-XII intact; normal reflexes in upper and lower extremities, sensation intact in upper and lower extremities b/l to light touch   PSYCH: Appropriate insight/judgment; A+O x 3, mood and affect appropriate, recent/remote memory intact

## 2024-10-14 NOTE — DISCHARGE NOTE PROVIDER - NSDCFUADDAPPT_GEN_ALL_CORE_FT
APPTS ARE READY TO BE MADE: [X] YES    Best Family or Patient Contact (if needed):     Additional Information about above appointments (if needed):    1: PCP in 2 weeks. Repeat BMP with pcp   2: Cardiology in 2 weeks   3:     Other comments or requests:

## 2024-10-14 NOTE — DISCHARGE NOTE PROVIDER - CARE PROVIDER_API CALL
Travis Allen  Cardiovascular Disease  [address] [place] [address]  Phone: [phone]  Fax: [phone]  Follow Up Time:     Dylan Canchola  Nephrology  [address] [place] [id number]  Phone: [phone]  Fax: [phone]  Follow Up Time:

## 2024-10-14 NOTE — DISCHARGE NOTE PROVIDER - NSDCCPCAREPLAN_GEN_ALL_CORE_FT
PRINCIPAL DISCHARGE DIAGNOSIS  Diagnosis: CHF exacerbation  Assessment and Plan of Treatment: Take Lasix 20mg oral qd  Fu cards inpatient      SECONDARY DISCHARGE DIAGNOSES  Diagnosis: Hyponatremia  Assessment and Plan of Treatment:     Diagnosis: Acute lower UTI (urinary tract infection)  Assessment and Plan of Treatment:      PRINCIPAL DISCHARGE DIAGNOSIS  Diagnosis: CHF exacerbation  Assessment and Plan of Treatment: Take Lasix 20mg oral qd  Fu cards inpatient  Weigh yourself daily.  If you gain 3lbs in 3 days, or 5lbs in a week call your Health Care Provider.  Do not eat or drink foods containing more than 2000mg of salt (sodium) in your diet every day.  Call your Health Care Provider if you have any swelling or increased swelling in your feet, ankles, and/or stomach.  Take all of your medication as directed.  If you become dizzy call your Health Care Provider.        SECONDARY DISCHARGE DIAGNOSES  Diagnosis: Hyponatremia  Assessment and Plan of Treatment: Resolved  Please continue to restrict your oral fluid intake to one liter daily  Follow up with your PMD/Renal specialist for repeat labs as clinically indicated.    Diagnosis: Acute lower UTI (urinary tract infection)  Assessment and Plan of Treatment: Completed course of anti    Diagnosis: Hypertension  Assessment and Plan of Treatment: Your Imdur was increased from 30 mg daily to 60 mg daily.  Follow up with your Cardiologist/PMD for ongoing blood pressure management

## 2024-10-16 DIAGNOSIS — E11.40 TYPE 2 DIABETES MELLITUS WITH DIABETIC NEUROPATHY, UNSPECIFIED: ICD-10-CM

## 2024-10-16 DIAGNOSIS — Z79.4 LONG TERM (CURRENT) USE OF INSULIN: ICD-10-CM

## 2024-10-16 DIAGNOSIS — M79.661 PAIN IN RIGHT LOWER LEG: ICD-10-CM

## 2024-10-16 DIAGNOSIS — L97.812 NON-PRESSURE CHRONIC ULCER OF OTHER PART OF RIGHT LOWER LEG WITH FAT LAYER EXPOSED: ICD-10-CM

## 2024-10-16 DIAGNOSIS — I10 ESSENTIAL (PRIMARY) HYPERTENSION: ICD-10-CM

## 2024-10-16 DIAGNOSIS — M06.9 RHEUMATOID ARTHRITIS, UNSPECIFIED: ICD-10-CM

## 2024-10-17 ENCOUNTER — APPOINTMENT (OUTPATIENT)
Dept: CARDIOLOGY | Facility: CLINIC | Age: 74
End: 2024-10-17
Payer: MEDICARE

## 2024-10-17 PROCEDURE — 93306 TTE W/DOPPLER COMPLETE: CPT

## 2024-10-18 ENCOUNTER — OUTPATIENT (OUTPATIENT)
Dept: OUTPATIENT SERVICES | Facility: HOSPITAL | Age: 74
LOS: 1 days | End: 2024-10-18

## 2024-10-18 DIAGNOSIS — Z98.890 OTHER SPECIFIED POSTPROCEDURAL STATES: Chronic | ICD-10-CM

## 2024-10-18 DIAGNOSIS — L97.809 NON-PRESSURE CHRONIC ULCER OF OTHER PART OF UNSPECIFIED LOWER LEG WITH UNSPECIFIED SEVERITY: ICD-10-CM

## 2024-10-18 DIAGNOSIS — H26.9 UNSPECIFIED CATARACT: Chronic | ICD-10-CM

## 2024-10-18 PROCEDURE — 29580 STRAPPING UNNA BOOT: CPT

## 2024-10-23 DIAGNOSIS — L97.812 NON-PRESSURE CHRONIC ULCER OF OTHER PART OF RIGHT LOWER LEG WITH FAT LAYER EXPOSED: ICD-10-CM

## 2024-10-23 DIAGNOSIS — E11.40 TYPE 2 DIABETES MELLITUS WITH DIABETIC NEUROPATHY, UNSPECIFIED: ICD-10-CM

## 2024-10-23 DIAGNOSIS — M79.661 PAIN IN RIGHT LOWER LEG: ICD-10-CM

## 2024-10-23 DIAGNOSIS — I10 ESSENTIAL (PRIMARY) HYPERTENSION: ICD-10-CM

## 2024-10-23 DIAGNOSIS — Z79.4 LONG TERM (CURRENT) USE OF INSULIN: ICD-10-CM

## 2024-10-23 DIAGNOSIS — M06.9 RHEUMATOID ARTHRITIS, UNSPECIFIED: ICD-10-CM

## 2024-10-25 ENCOUNTER — OUTPATIENT (OUTPATIENT)
Dept: OUTPATIENT SERVICES | Facility: HOSPITAL | Age: 74
LOS: 1 days | End: 2024-10-25
Payer: MEDICARE

## 2024-10-25 DIAGNOSIS — L97.809 NON-PRESSURE CHRONIC ULCER OF OTHER PART OF UNSPECIFIED LOWER LEG WITH UNSPECIFIED SEVERITY: ICD-10-CM

## 2024-10-25 DIAGNOSIS — Z98.890 OTHER SPECIFIED POSTPROCEDURAL STATES: Chronic | ICD-10-CM

## 2024-10-25 DIAGNOSIS — H26.9 UNSPECIFIED CATARACT: Chronic | ICD-10-CM

## 2024-10-25 DIAGNOSIS — Z86.011 PERSONAL HISTORY OF BENIGN NEOPLASM OF THE BRAIN: Chronic | ICD-10-CM

## 2024-10-25 DIAGNOSIS — Z95.0 PRESENCE OF CARDIAC PACEMAKER: Chronic | ICD-10-CM

## 2024-10-25 PROCEDURE — 29580 STRAPPING UNNA BOOT: CPT

## 2024-11-01 ENCOUNTER — OUTPATIENT (OUTPATIENT)
Dept: OUTPATIENT SERVICES | Facility: HOSPITAL | Age: 74
LOS: 1 days | End: 2024-11-01
Payer: MEDICARE

## 2024-11-01 DIAGNOSIS — I10 ESSENTIAL (PRIMARY) HYPERTENSION: ICD-10-CM

## 2024-11-01 DIAGNOSIS — L97.812 NON-PRESSURE CHRONIC ULCER OF OTHER PART OF RIGHT LOWER LEG WITH FAT LAYER EXPOSED: ICD-10-CM

## 2024-11-01 DIAGNOSIS — M79.661 PAIN IN RIGHT LOWER LEG: ICD-10-CM

## 2024-11-01 DIAGNOSIS — M06.9 RHEUMATOID ARTHRITIS, UNSPECIFIED: ICD-10-CM

## 2024-11-01 DIAGNOSIS — L97.809 NON-PRESSURE CHRONIC ULCER OF OTHER PART OF UNSPECIFIED LOWER LEG WITH UNSPECIFIED SEVERITY: ICD-10-CM

## 2024-11-01 DIAGNOSIS — E11.40 TYPE 2 DIABETES MELLITUS WITH DIABETIC NEUROPATHY, UNSPECIFIED: ICD-10-CM

## 2024-11-01 DIAGNOSIS — Z98.890 OTHER SPECIFIED POSTPROCEDURAL STATES: Chronic | ICD-10-CM

## 2024-11-01 DIAGNOSIS — Z79.4 LONG TERM (CURRENT) USE OF INSULIN: ICD-10-CM

## 2024-11-01 DIAGNOSIS — H26.9 UNSPECIFIED CATARACT: Chronic | ICD-10-CM

## 2024-11-01 DIAGNOSIS — I87.311 CHRONIC VENOUS HYPERTENSION (IDIOPATHIC) WITH ULCER OF RIGHT LOWER EXTREMITY: ICD-10-CM

## 2024-11-01 DIAGNOSIS — Z95.0 PRESENCE OF CARDIAC PACEMAKER: Chronic | ICD-10-CM

## 2024-11-01 DIAGNOSIS — Z86.011 PERSONAL HISTORY OF BENIGN NEOPLASM OF THE BRAIN: Chronic | ICD-10-CM

## 2024-11-01 PROCEDURE — 99212 OFFICE O/P EST SF 10 MIN: CPT

## 2024-11-05 ENCOUNTER — APPOINTMENT (OUTPATIENT)
Dept: PULMONOLOGY | Facility: CLINIC | Age: 74
End: 2024-11-05
Payer: MEDICARE

## 2024-11-05 VITALS
HEIGHT: 59 IN | BODY MASS INDEX: 29.43 KG/M2 | WEIGHT: 146 LBS | DIASTOLIC BLOOD PRESSURE: 68 MMHG | HEART RATE: 64 BPM | SYSTOLIC BLOOD PRESSURE: 124 MMHG | RESPIRATION RATE: 16 BRPM | OXYGEN SATURATION: 98 %

## 2024-11-05 DIAGNOSIS — I50.9 HEART FAILURE, UNSPECIFIED: ICD-10-CM

## 2024-11-05 DIAGNOSIS — J90 PLEURAL EFFUSION, NOT ELSEWHERE CLASSIFIED: ICD-10-CM

## 2024-11-05 PROCEDURE — 99203 OFFICE O/P NEW LOW 30 MIN: CPT

## 2024-11-05 PROCEDURE — G2211 COMPLEX E/M VISIT ADD ON: CPT

## 2024-11-08 ENCOUNTER — OUTPATIENT (OUTPATIENT)
Dept: OUTPATIENT SERVICES | Facility: HOSPITAL | Age: 74
LOS: 1 days | End: 2024-11-08

## 2024-11-08 DIAGNOSIS — Z86.011 PERSONAL HISTORY OF BENIGN NEOPLASM OF THE BRAIN: Chronic | ICD-10-CM

## 2024-11-08 DIAGNOSIS — Z95.0 PRESENCE OF CARDIAC PACEMAKER: Chronic | ICD-10-CM

## 2024-11-08 DIAGNOSIS — L97.809 NON-PRESSURE CHRONIC ULCER OF OTHER PART OF UNSPECIFIED LOWER LEG WITH UNSPECIFIED SEVERITY: ICD-10-CM

## 2024-11-08 DIAGNOSIS — Z98.890 OTHER SPECIFIED POSTPROCEDURAL STATES: Chronic | ICD-10-CM

## 2024-11-08 DIAGNOSIS — H26.9 UNSPECIFIED CATARACT: Chronic | ICD-10-CM

## 2024-11-08 PROCEDURE — 99213 OFFICE O/P EST LOW 20 MIN: CPT

## 2024-11-15 ENCOUNTER — NON-APPOINTMENT (OUTPATIENT)
Age: 74
End: 2024-11-15

## 2024-11-15 DIAGNOSIS — I87.311 CHRONIC VENOUS HYPERTENSION (IDIOPATHIC) WITH ULCER OF RIGHT LOWER EXTREMITY: ICD-10-CM

## 2024-11-15 DIAGNOSIS — L97.812 NON-PRESSURE CHRONIC ULCER OF OTHER PART OF RIGHT LOWER LEG WITH FAT LAYER EXPOSED: ICD-10-CM

## 2024-11-15 DIAGNOSIS — M06.9 RHEUMATOID ARTHRITIS, UNSPECIFIED: ICD-10-CM

## 2024-11-15 DIAGNOSIS — M79.661 PAIN IN RIGHT LOWER LEG: ICD-10-CM

## 2024-11-15 DIAGNOSIS — E11.40 TYPE 2 DIABETES MELLITUS WITH DIABETIC NEUROPATHY, UNSPECIFIED: ICD-10-CM

## 2024-11-15 DIAGNOSIS — Z79.4 LONG TERM (CURRENT) USE OF INSULIN: ICD-10-CM

## 2024-11-16 ENCOUNTER — INPATIENT (INPATIENT)
Facility: HOSPITAL | Age: 74
LOS: 4 days | Discharge: HOME | DRG: 293 | End: 2024-11-21
Attending: STUDENT IN AN ORGANIZED HEALTH CARE EDUCATION/TRAINING PROGRAM | Admitting: HOSPITALIST
Payer: MEDICARE

## 2024-11-16 VITALS
SYSTOLIC BLOOD PRESSURE: 136 MMHG | HEART RATE: 62 BPM | TEMPERATURE: 98 F | HEIGHT: 62 IN | WEIGHT: 154.98 LBS | OXYGEN SATURATION: 93 % | DIASTOLIC BLOOD PRESSURE: 68 MMHG | RESPIRATION RATE: 20 BRPM

## 2024-11-16 DIAGNOSIS — Z95.0 PRESENCE OF CARDIAC PACEMAKER: Chronic | ICD-10-CM

## 2024-11-16 DIAGNOSIS — Z98.890 OTHER SPECIFIED POSTPROCEDURAL STATES: Chronic | ICD-10-CM

## 2024-11-16 DIAGNOSIS — E87.5 HYPERKALEMIA: ICD-10-CM

## 2024-11-16 DIAGNOSIS — I50.9 HEART FAILURE, UNSPECIFIED: ICD-10-CM

## 2024-11-16 DIAGNOSIS — I50.23 ACUTE ON CHRONIC SYSTOLIC (CONGESTIVE) HEART FAILURE: ICD-10-CM

## 2024-11-16 DIAGNOSIS — Z86.011 PERSONAL HISTORY OF BENIGN NEOPLASM OF THE BRAIN: Chronic | ICD-10-CM

## 2024-11-16 DIAGNOSIS — H26.9 UNSPECIFIED CATARACT: Chronic | ICD-10-CM

## 2024-11-16 DIAGNOSIS — I25.10 ATHEROSCLEROTIC HEART DISEASE OF NATIVE CORONARY ARTERY WITHOUT ANGINA PECTORIS: ICD-10-CM

## 2024-11-16 LAB
ALBUMIN SERPL ELPH-MCNC: 3.6 G/DL — SIGNIFICANT CHANGE UP (ref 3.3–5.2)
ALP SERPL-CCNC: 99 U/L — SIGNIFICANT CHANGE UP (ref 40–120)
ALT FLD-CCNC: SIGNIFICANT CHANGE UP U/L
ANION GAP SERPL CALC-SCNC: 18 MMOL/L — HIGH (ref 5–17)
ANION GAP SERPL CALC-SCNC: 20 MMOL/L — HIGH (ref 5–17)
AST SERPL-CCNC: SIGNIFICANT CHANGE UP U/L
BASOPHILS # BLD AUTO: 0.07 K/UL — SIGNIFICANT CHANGE UP (ref 0–0.2)
BASOPHILS NFR BLD AUTO: 0.5 % — SIGNIFICANT CHANGE UP (ref 0–2)
BILIRUB SERPL-MCNC: 0.4 MG/DL — SIGNIFICANT CHANGE UP (ref 0.4–2)
BUN SERPL-MCNC: 38.9 MG/DL — HIGH (ref 8–20)
BUN SERPL-MCNC: 40.4 MG/DL — HIGH (ref 8–20)
CALCIUM SERPL-MCNC: 8.8 MG/DL — SIGNIFICANT CHANGE UP (ref 8.4–10.5)
CALCIUM SERPL-MCNC: 9.3 MG/DL — SIGNIFICANT CHANGE UP (ref 8.4–10.5)
CHLORIDE SERPL-SCNC: 93 MMOL/L — LOW (ref 96–108)
CHLORIDE SERPL-SCNC: 95 MMOL/L — LOW (ref 96–108)
CO2 SERPL-SCNC: 14 MMOL/L — LOW (ref 22–29)
CO2 SERPL-SCNC: 17 MMOL/L — LOW (ref 22–29)
CREAT SERPL-MCNC: 1.54 MG/DL — HIGH (ref 0.5–1.3)
CREAT SERPL-MCNC: 1.55 MG/DL — HIGH (ref 0.5–1.3)
EGFR: 35 ML/MIN/1.73M2 — LOW
EGFR: 36 ML/MIN/1.73M2 — LOW
EOSINOPHIL # BLD AUTO: 0.24 K/UL — SIGNIFICANT CHANGE UP (ref 0–0.5)
EOSINOPHIL NFR BLD AUTO: 1.7 % — SIGNIFICANT CHANGE UP (ref 0–6)
GLUCOSE SERPL-MCNC: 118 MG/DL — HIGH (ref 70–99)
GLUCOSE SERPL-MCNC: 149 MG/DL — HIGH (ref 70–99)
HCT VFR BLD CALC: 29.4 % — LOW (ref 34.5–45)
HGB BLD-MCNC: 9.3 G/DL — LOW (ref 11.5–15.5)
IMM GRANULOCYTES NFR BLD AUTO: 1.1 % — HIGH (ref 0–0.9)
LYMPHOCYTES # BLD AUTO: 18 % — SIGNIFICANT CHANGE UP (ref 13–44)
LYMPHOCYTES # BLD AUTO: 2.51 K/UL — SIGNIFICANT CHANGE UP (ref 1–3.3)
MCHC RBC-ENTMCNC: 26.3 PG — LOW (ref 27–34)
MCHC RBC-ENTMCNC: 31.6 G/DL — LOW (ref 32–36)
MCV RBC AUTO: 83.1 FL — SIGNIFICANT CHANGE UP (ref 80–100)
MONOCYTES # BLD AUTO: 1.12 K/UL — HIGH (ref 0–0.9)
MONOCYTES NFR BLD AUTO: 8 % — SIGNIFICANT CHANGE UP (ref 2–14)
NEUTROPHILS # BLD AUTO: 9.88 K/UL — HIGH (ref 1.8–7.4)
NEUTROPHILS NFR BLD AUTO: 70.7 % — SIGNIFICANT CHANGE UP (ref 43–77)
NT-PROBNP SERPL-SCNC: HIGH PG/ML (ref 0–300)
NT-PROBNP SERPL-SCNC: HIGH PG/ML (ref 0–300)
PLATELET # BLD AUTO: 558 K/UL — HIGH (ref 150–400)
POTASSIUM SERPL-MCNC: 4.9 MMOL/L — SIGNIFICANT CHANGE UP (ref 3.5–5.3)
POTASSIUM SERPL-MCNC: 6.3 MMOL/L — CRITICAL HIGH (ref 3.5–5.3)
POTASSIUM SERPL-SCNC: 4.9 MMOL/L — SIGNIFICANT CHANGE UP (ref 3.5–5.3)
POTASSIUM SERPL-SCNC: 6.3 MMOL/L — CRITICAL HIGH (ref 3.5–5.3)
PROT SERPL-MCNC: 7.5 G/DL — SIGNIFICANT CHANGE UP (ref 6.6–8.7)
RBC # BLD: 3.54 M/UL — LOW (ref 3.8–5.2)
RBC # FLD: 19 % — HIGH (ref 10.3–14.5)
SODIUM SERPL-SCNC: 128 MMOL/L — LOW (ref 135–145)
SODIUM SERPL-SCNC: 129 MMOL/L — LOW (ref 135–145)
TROPONIN T, HIGH SENSITIVITY RESULT: 27 NG/L — SIGNIFICANT CHANGE UP (ref 0–51)
TROPONIN T, HIGH SENSITIVITY RESULT: 30 NG/L — SIGNIFICANT CHANGE UP (ref 0–51)
TROPONIN T, HIGH SENSITIVITY RESULT: 32 NG/L — SIGNIFICANT CHANGE UP (ref 0–51)
WBC # BLD: 13.98 K/UL — HIGH (ref 3.8–10.5)
WBC # FLD AUTO: 13.98 K/UL — HIGH (ref 3.8–10.5)

## 2024-11-16 PROCEDURE — 71045 X-RAY EXAM CHEST 1 VIEW: CPT | Mod: 26

## 2024-11-16 PROCEDURE — 99285 EMERGENCY DEPT VISIT HI MDM: CPT

## 2024-11-16 PROCEDURE — 93010 ELECTROCARDIOGRAM REPORT: CPT | Mod: 76

## 2024-11-16 PROCEDURE — 99223 1ST HOSP IP/OBS HIGH 75: CPT

## 2024-11-16 RX ORDER — GABAPENTIN 300 MG/1
300 CAPSULE ORAL AT BEDTIME
Refills: 0 | Status: DISCONTINUED | OUTPATIENT
Start: 2024-11-16 | End: 2024-11-21

## 2024-11-16 RX ORDER — CLOPIDOGREL 75 MG/1
75 TABLET, FILM COATED ORAL DAILY
Refills: 0 | Status: DISCONTINUED | OUTPATIENT
Start: 2024-11-16 | End: 2024-11-21

## 2024-11-16 RX ORDER — GLUCAGON INJECTION, SOLUTION 0.5 MG/.1ML
1 INJECTION, SOLUTION SUBCUTANEOUS ONCE
Refills: 0 | Status: DISCONTINUED | OUTPATIENT
Start: 2024-11-16 | End: 2024-11-21

## 2024-11-16 RX ORDER — SODIUM ZIRCONIUM CYCLOSILICATE 5 G/5G
10 POWDER, FOR SUSPENSION ORAL ONCE
Refills: 0 | Status: COMPLETED | OUTPATIENT
Start: 2024-11-16 | End: 2024-11-16

## 2024-11-16 RX ORDER — 0.9 % SODIUM CHLORIDE 0.9 %
1000 INTRAVENOUS SOLUTION INTRAVENOUS
Refills: 0 | Status: DISCONTINUED | OUTPATIENT
Start: 2024-11-16 | End: 2024-11-21

## 2024-11-16 RX ORDER — METOPROLOL TARTRATE 100 MG/1
100 TABLET, FILM COATED ORAL DAILY
Refills: 0 | Status: DISCONTINUED | OUTPATIENT
Start: 2024-11-16 | End: 2024-11-21

## 2024-11-16 RX ORDER — ISOSORBIDE MONONITRATE 10 MG
60 TABLET ORAL DAILY
Refills: 0 | Status: DISCONTINUED | OUTPATIENT
Start: 2024-11-16 | End: 2024-11-21

## 2024-11-16 RX ORDER — MAGNESIUM, ALUMINUM HYDROXIDE 200-225/5
30 SUSPENSION, ORAL (FINAL DOSE FORM) ORAL EVERY 4 HOURS
Refills: 0 | Status: DISCONTINUED | OUTPATIENT
Start: 2024-11-16 | End: 2024-11-21

## 2024-11-16 RX ORDER — INSULIN GLARGINE 100 [IU]/ML
30 INJECTION, SOLUTION SUBCUTANEOUS AT BEDTIME
Refills: 0 | Status: DISCONTINUED | OUTPATIENT
Start: 2024-11-17 | End: 2024-11-18

## 2024-11-16 RX ORDER — HEPARIN SODIUM,PORCINE 1000/ML
5000 VIAL (ML) INJECTION EVERY 12 HOURS
Refills: 0 | Status: DISCONTINUED | OUTPATIENT
Start: 2024-11-16 | End: 2024-11-21

## 2024-11-16 RX ORDER — ACETAMINOPHEN, DIPHENHYDRAMINE HCL, PHENYLEPHRINE HCL 325; 25; 5 MG/1; MG/1; MG/1
3 TABLET ORAL AT BEDTIME
Refills: 0 | Status: DISCONTINUED | OUTPATIENT
Start: 2024-11-16 | End: 2024-11-21

## 2024-11-16 RX ORDER — ACETAMINOPHEN 500MG 500 MG/1
650 TABLET, COATED ORAL EVERY 6 HOURS
Refills: 0 | Status: DISCONTINUED | OUTPATIENT
Start: 2024-11-16 | End: 2024-11-21

## 2024-11-16 RX ORDER — AMLODIPINE BESYLATE 10 MG/1
10 TABLET ORAL DAILY
Refills: 0 | Status: DISCONTINUED | OUTPATIENT
Start: 2024-11-16 | End: 2024-11-21

## 2024-11-16 RX ORDER — FUROSEMIDE 40 MG/1
40 TABLET ORAL
Refills: 0 | Status: DISCONTINUED | OUTPATIENT
Start: 2024-11-16 | End: 2024-11-20

## 2024-11-16 RX ORDER — INSULIN REG, HUM S-S BUFF 100/ML
5 VIAL (ML) INJECTION ONCE
Refills: 0 | Status: COMPLETED | OUTPATIENT
Start: 2024-11-16 | End: 2024-11-16

## 2024-11-16 RX ORDER — ONDANSETRON HYDROCHLORIDE 4 MG/1
4 TABLET, FILM COATED ORAL EVERY 8 HOURS
Refills: 0 | Status: DISCONTINUED | OUTPATIENT
Start: 2024-11-16 | End: 2024-11-21

## 2024-11-16 RX ORDER — FERROUS SULFATE 325(65) MG
325 TABLET ORAL THREE TIMES A DAY
Refills: 0 | Status: DISCONTINUED | OUTPATIENT
Start: 2024-11-16 | End: 2024-11-21

## 2024-11-16 RX ORDER — RANOLAZINE 1000 MG/1
500 TABLET, FILM COATED, EXTENDED RELEASE ORAL
Refills: 0 | Status: DISCONTINUED | OUTPATIENT
Start: 2024-11-16 | End: 2024-11-21

## 2024-11-16 RX ORDER — FUROSEMIDE 40 MG/1
40 TABLET ORAL ONCE
Refills: 0 | Status: COMPLETED | OUTPATIENT
Start: 2024-11-16 | End: 2024-11-16

## 2024-11-16 RX ADMIN — FUROSEMIDE 40 MILLIGRAM(S): 40 TABLET ORAL at 18:47

## 2024-11-16 RX ADMIN — Medication 5 UNIT(S): at 21:27

## 2024-11-16 RX ADMIN — Medication 50 MILLILITER(S): at 21:27

## 2024-11-16 RX ADMIN — SODIUM ZIRCONIUM CYCLOSILICATE 10 GRAM(S): 5 POWDER, FOR SUSPENSION ORAL at 21:27

## 2024-11-16 NOTE — ED ADULT NURSE NOTE - HIV OFFER
Please see note from patient below question of dose.     Medication: levothyroxine (SYNTHROID, LEVOTHROID)   Dosage: question of dose 100 or 112 mcg  Sig: Take 1 tablet by mouth daily.  Qty: 30 tablet 0 refill  Last Refill: 6/13/19  Last Office Visit for this diagnosis: 4/4/19  Next Appt:  7/18/19  Pharmacy Verified:  YES  Patient informed that refill may take up to 48 hours:   NO   Opt out

## 2024-11-16 NOTE — ED ADULT NURSE NOTE - ED STAT RN HANDOFF DETAILS
pt stable NAD noted RR even unlabored VSS on RA as noted HR given to Enedelia HENRIQUEZ updated on POC and accepted fall and safety precautions in place

## 2024-11-16 NOTE — ED PROVIDER NOTE - PROGRESS NOTE DETAILS
Patient reports feeling better s/p meds. Labs showed hyponatremia, hyperkalemia, BRADY, elevated BNP, Repeat EKG no acute changes. Hyperkalemia cocktail, Lokelma, lasix given. Cardiology, nephro consulted. Discussed with admitting hospitalist, add on CT chest. Admit to medicine, tele bed.

## 2024-11-16 NOTE — CONSULT NOTE ADULT - NS ATTEND AMEND GEN_ALL_CORE FT
73 y/o F with PMH multivessel CAD (LHC 2021 showed severe dLAD disease, 50% mLCx, 80% OM1, 70% LPL1, severe RPDA disease), managed medically, HFrEF (LVEF 40% 10/2024), Mobitz II s/p MDT Micra PPM, PAD with chronic RLE wound, HTN, HLD, prior CVA with R sided deficits, DM2, CKD3 p/w dyspnea, LE edema, and weight gain. Patient was recently admitted to [facility] 10/2024 with HFpEF and BRADY; reportedly refused repeat LHC. Prior ischemic workup with NST 6/2024 showed no ischemia/infarct, but TID with ratio 1.22.  -pBNP 16K with pulmonary edema, small b/l pleural effusions on CT chest   -Per outpatient notes, patient has previously been noncompliant with medications, however she states that she has been taking medications following her prior hospitalization. She was dc'ed on lasix 20mg po daily. She was advised to d/c ARB due to hyperkalemia on prior visit, but has continued taking it.   -Continue lasix 40mg IV bid. Monitor I's/O's, daily weights, renal function and electrolytes. Na 128  -Cr improving (1.55 -> 1.54 -> 1.43, previously 1.19)  -Continue DAPT, statin, antianginals (imdur, ranexa)  -GDMT with Toprol XL 100mg. Will add hydralazine if unable to tolerate ACE/ARB/ARNI/MRA due to hyperkalemia. Assess for SGLT2i on discharge   -Risks/benefits of repeat LHC discussed. Previously refused due to risk of SALO. Patient is more agreeable pending hospital course

## 2024-11-16 NOTE — CONSULT NOTE ADULT - PROBLEM SELECTOR RECOMMENDATION 9
-telemetry monitoring  -Lasix 40 mg IV bid  -strict I&Os   -daily weights  -monitor renal function and electrolytes

## 2024-11-16 NOTE — ED ADULT TRIAGE NOTE - WEIGHT IN LBS
No Nutritional Needs at this time   Melissa Hope is a 71 year old female here for  Chief Complaint   Patient presents with   • Office Visit     Renal cell carcinoma, unspecified laterality     Denies latex allergy or sensitivity.    Medication verified, no changes.  PCP and Pharmacy verified.    Social History     Tobacco Use   Smoking Status Former   • Current packs/day: 0.00   • Types: Cigarettes   • Quit date: 1985   • Years since quittin.5   Smokeless Tobacco Never   Tobacco Comments    10-21-19     Advance Directives Filed: No    Vitals:    Visit Vitals  BP (!) 145/100 (BP Location: RFA - Right forearm, Patient Position: Standing, Cuff Size: Regular)   Pulse 81   Temp 98.6 °F (37 °C) (Temporal)   Resp 18   Ht 5' 4\" (1.626 m)   Wt 110.3 kg (243 lb 1.6 oz)   BMI 41.73 kg/m²       These vital signs are:      Height: Yes, shoes off.  Ht Readings from Last 1 Encounters:   23 5' 4\" (1.626 m)     Weight:Yes, shoes on.  Wt Readings from Last 3 Encounters:   23 110.3 kg (243 lb 1.6 oz)   23 107.4 kg (236 lb 11.2 oz)   23 101.5 kg (223 lb 12.3 oz)       BMI: Body mass index is 41.73 kg/m².    REVIEW OF SYSTEMS  PSYCHIATRIC: Patient denies insomnia, depression, anxiety    This patient reported abnormal symptoms that needed immediate verbal communication: Yes, these symptoms included bilateral lower leg edema and were reported to Farheen WARD.  5 minutes   154.9

## 2024-11-16 NOTE — CONSULT NOTE ADULT - SUBJECTIVE AND OBJECTIVE BOX
[facility]                                              CARDIOLOGY AT [address], [place] [address]                                             Telephone: [phone]. Fax:[phone]                                                       CARDIOLOGY CONSULTATION NOTE                                                                                             History obtained by: Patient and medical record  Community Cardiologist: Dr. Allen   obtained: Yes [  ] No [ x ]pt prefers daughter to translate  Reason for Consultation: CHF  Available out pt records reviewed: Yes [x  ] No [  ]    Chief complaint:    Patient is a 74y old  Female who presents with a chief complaint of Decompensated HF (16 Nov 2024 23:04)      HPI:  This is 73 y/o female with hx of CAD s/p PCI, HFrEF, Mobitz II s/p PPM (Nereyda, MDT), PAD with chronic right LE wound, CVA, DM2, CKD3, HTN who presents with SOB, leg edema and chest tightness x3 days. Pt states that she gets out of breath while walking. She gained 6 lbs over the last week and noticed that her legs are swelling. Blood work significant for hyperkalemia 6.3, Cr 1.55, proBNP 68384. CXR reveals pulmonary edema and increased small R pleural effusion. Pt was admitted last month with similar symptoms. Echocardiogram on 10/8/24 revealed EF 45-50% and new regional WMA. Pt was offered cardiac catheterization but declined due to renal concerns. She follows with Dr. Allen.    CARDIAC TESTING   ECHO:  < from: TTE W or WO Ultrasound Enhancing Agent (10.08.24 @ 15:15) >     CONCLUSIONS:      1. Left ventricular cavity is normal in size. Left ventricular systolic function is mildly decreased with an ejection fraction of 47 % by Taylor's method of disks with an ejection fraction visually estimated at 45 to 50 %. Regional wall motion abnormalities present.   2. Multiple segmental abnormalities exist. See findings.   3. Unable to assess left ventricular diastolic function due to insufficient data.   4. The rightventricle is not well visualized but probably normal right ventricular cavity size and normal right ventricular systolic function.   5. Left atrium is mildly dilated.   6. Mild tricuspid regurgitation.   7. Thickened mitral valve leaflets.   8. Thereis mild anterior calcification and mild posterior calcification of the mitral valve annulus.   9. Fibrocalcific aortic valve sclerosis without stenosis.  10. Estimated pulmonary artery systolic pressure is 45 mmHg.  11. No pericardial effusion seen.  12. Compared to the transthoracic echocardiogram performed on 11/25/2021, there are new regional wall motion abnormalities present and newly reduced LVEF.    < end of copied text >      STRESS: n/a    CATH:   < from: Cardiac Catheterization (11.26.21 @ 10:14) >  CORONARY VESSELS: The coronary circulation is right dominant.  LM:   --  LM: The vessel was large sized. Angiography showed mild  atherosclerosis with no flow limiting lesions.  LAD:   --  LAD: The vessel was large sized.  --  Distal LAD: Angiography showed severe atherosclerosis.  CX:   --  Circumflex: There was a tubular 50 % stenosis in the middle third  of the vessel segment.  --  OM1: The vesselwas large sized that bifurcates into 2 branches. There  was a 80 % stenosis in both branches  --  LPL1: There was a 70 % stenosis at the ostium of the vessel segment.  RCA:   --  RPDA: The vessel was medium sized. Angiography showed severe  atherosclerosis.  COMPLICATIONS: No complications occurred during the cath lab visit.  DIAGNOSTIC IMPRESSIONS: Multivessel disease ( distal LAD, OM1 and LPL Of CX  and RPDA)  DIAGNOSTIC RECOMMENDATIONS: Branch vessel and severe distal LAD and RPDA  disease notsuitable for PCI , recommend medical therapy with antianginal  if persistent angina despite optimal medical therapy may consider PCI of  OM1 of CX.  Prepared and signed by  Amilcar Vivar MD  Signed 12/02/2021 06:31:12    < end of copied text >      ELECTROPHYSIOLOGY: n/a      PAST MEDICAL HISTORY  CAD    DM (diabetes mellitus)    HTN (hypertension)    Acute otitis media, unspecified otitis media type    H/O gastroesophageal reflux (GERD)    Cardiac pacemaker    CVA (cerebrovascular accident)    CVA (cerebrovascular accident)    PAD (peripheral artery disease)    Wound of right leg        PAST SURGICAL HISTORY  History of benign brain tumor    Cataract    History of biopsy    Cardiac pacemaker    S/P angiogram of extremity        SOCIAL HISTORY:  lives with   CIGARETTES:   never smoked  ALCOHOL: denies  DRUGS: denies    FAMILY HISTORY:  FH: asthma  Son      Family History of Cardiovascular Disease:  Yes [  ] No [  ]  Coronary Artery Disease in first degree relative: Yes [  ] No [  ]  Sudden Cardiac Death in First degree relative: Yes [  ] No [  ]    HOME MEDICATIONS:  amLODIPine 10 mg oral tablet: 1 tab(s) orally once a day (14 Oct 2024 11:34)  clopidogrel 75 mg oral tablet: 1 tab(s) orally once a day (14 Oct 2024 11:34)  ferrous sulfate 325 mg (65 mg elemental iron) oral tablet: 1 tab(s) orally 3 times a day (14 Oct 2024 11:34)  insulin glargine 100 units/mL subcutaneous solution: 30 unit(s) subcutaneous once a day (at bedtime) (16 Jul 2024 03:16)      CURRENT CARDIAC MEDICATIONS:  amLODIPine   Tablet 10 milliGRAM(s) Oral daily  furosemide   Injectable 40 milliGRAM(s) IV Push two times a day  isosorbide   mononitrate ER Tablet (IMDUR) 60 milliGRAM(s) Oral daily  metoprolol succinate  milliGRAM(s) Oral daily  ranolazine 500 milliGRAM(s) Oral two times a day      CURRENT OTHER MEDICATIONS:  acetaminophen     Tablet .. 650 milliGRAM(s) Oral every 6 hours PRN Temp greater or equal to 38C (100.4F), Mild Pain (1 - 3)  gabapentin 300 milliGRAM(s) Oral at bedtime  melatonin 3 milliGRAM(s) Oral at bedtime PRN Insomnia  ondansetron Injectable 4 milliGRAM(s) IV Push every 8 hours PRN Nausea and/or Vomiting  aluminum hydroxide/magnesium hydroxide/simethicone Suspension 30 milliLiter(s) Oral every 4 hours PRN Dyspepsia  aspirin enteric coated 81 milliGRAM(s) Oral daily  atorvastatin 80 milliGRAM(s) Oral at bedtime  clopidogrel Tablet 75 milliGRAM(s) Oral daily  ferrous    sulfate 325 milliGRAM(s) Oral three times a day  heparin   Injectable 5000 Unit(s) SubCutaneous every 12 hours      ALLERGIES:   No Known Allergies      REVIEW OF SYMPTOMS:   CONSTITUTIONAL: No fever, no chills, no weight loss, no weight gain, no fatigue   ENMT:  No vertigo; No sinus or throat pain  NECK: No pain or stiffness  CARDIOVASCULAR: as per HPI  RESPIRATORY: + Shortness of breath, no cough, no wheezing  : No dysuria, no hematuria   GI: No dark color stool, no nausea, no diarrhea, no constipation, no abdominal pain   NEURO: No headache, no slurred speech   MUSCULOSKELETAL: No joint pain or swelling; No muscle, back, or extremity pain  PSYCH: No agitation, no anxiety.    ALL OTHER REVIEW OF SYSTEMS ARE NEGATIVE.    VITAL SIGNS:  T(C): 36.5 (11-16-24 @ 16:27), Max: 36.5 (11-16-24 @ 16:27)  T(F): 97.7 (11-16-24 @ 16:27), Max: 97.7 (11-16-24 @ 16:27)  HR: 72 (11-16-24 @ 18:43) (62 - 72)  BP: 148/56 (11-16-24 @ 18:43) (136/68 - 148/56)  RR: 20 (11-16-24 @ 16:27) (20 - 20)  SpO2: 93% (11-16-24 @ 16:27) (93% - 93%)    INTAKE AND OUTPUT:       PHYSICAL EXAM:  Constitutional: Comfortable . No acute distress.   HEENT: Atraumatic and normocephalic , neck is supple . no JVD. No carotid bruit.  CNS: A&Ox3. No focal deficits.   Respiratory: basilar rales  Cardiovascular: RRR normal s1 s2. No murmurs  Gastrointestinal: Soft, non-tender. +Bowel sounds.   Extremities: 2+ Peripheral Pulses, No clubbing, cyanosis, +2 LE edema  Psychiatric: Calm . no agitation.   Skin: Warm and dry, no ulcers on extremities     LABS:                            9.3    13.98 )-----------( 558      ( 16 Nov 2024 17:30 )             29.4     11-16    129[L]  |  95[L]  |  38.9[H]  ----------------------------<  118[H]  6.3[HH]   |  14.0[L]  |  1.55[H]    Ca    8.8      16 Nov 2024 19:30    TPro  7.5  /  Alb  3.6  /  TBili  0.4  /  DBili  x   /  AST  SEE NOTE  /  ALT  SEE NOTE  /  AlkPhos  99  11-16      Urinalysis Basic - ( 16 Nov 2024 19:30 )    Color: x / Appearance: x / SG: x / pH: x  Gluc: 118 mg/dL / Ketone: x  / Bili: x / Urobili: x   Blood: x / Protein: x / Nitrite: x   Leuk Esterase: x / RBC: x / WBC x   Sq Epi: x / Non Sq Epi: x / Bacteria: x        INTERPRETATION OF TELEMETRY: V-paced    ECG: V-paced  Prior ECG: Yes [ x ] No [  ]    RADIOLOGY & ADDITIONAL STUDIES:    X-ray:    < from: Xray Chest 1 View- PORTABLE-Urgent (11.16.24 @ 18:22) >  IMPRESSION:    Pulmonary edema.  Increased small right pleural effusion and right peripheral lung opacity,   possibly representing loculated fluid.    --- End of Report ---      SOUMYA LOTT MD; Attending Radiologist  This document has been electronically signed. Nov 16 2024 10:54PM    < end of copied text >    CT scan:   MRI:   US:

## 2024-11-16 NOTE — H&P ADULT - ASSESSMENT
75yo F with PMHx of CAD s/p PCI, HB s/p PPM, HFrEF, IDDM, HTN, HLD, CKD presented to ED c/o progressive sob/RAMIREZ with associated b/l LE swelling for the past 3 days.     Decompensated HF  -likely due to diuretic noncompliance   -with acute volume overload on exam   -Elevated proBNP 19k, CXR with acute pulmonary vascular congestion   -B/l LE pitting edema noted on exam   -received Lasix 40mg IV x 1 dose in the ED with good diuresis   -Cont Lasix 40mg IV BID   -daily weight, monitor I/O   -Fluid striction  -recent TTE noted, no need to repeat    -Cardiology consulted     ARF on CKD3 with metabolic acidosis  -likely due to volume overload   -Cr 1.55, BUN 38.9, baseline 1.19 on recent dc  -AG 20, bicarb 14 improved on repeat after diparesis  -monitor renal function closely   -renally dose medications       Hyperkalemia  -K 6.3, likely 2/2 valsartan use  -Valsartan was discontinued on her prior admission but pt continued to take   -received cocktail in the ED, repeat K 4.9  -cont to monitor     CAD s/p PCI  -pt with intermittent anginal symptoms at home   -TTE with regional wall motion abnormalities previously  -currently pt without chest pain   -Troponin and EKG with TW changes   -Cont with ASA/Plavix, Ranexa and atorvastatin   -Cardiology consulted     IDDM  -Cont Lantus 30u QHS   -Hold metformin  -ISS, hypoglycemic protocol     HTN  -cont Amlodipine, Imdur and Metoprolol     DVT ppx  -Heparin subQ   Dispo: active, anticipated dc 3-4 days      Time-based billing (NON-critical care).     78 minutes spent on total encounter. The necessity of the time spent during the encounter on this date of service was due to:   chart review, patient evaluation, independent history taking, documentation, coordination of care and discussion with patient, nurse and family at bedside.

## 2024-11-16 NOTE — ED ADULT NURSE NOTE - OBJECTIVE STATEMENT
Pt arrives to ED c/o SOB for x3 days. Pt is A&Ox4 and states she prefers daughter to provide history. As per daughter pt has history of CHF and has had shortness of breath and worsening swelling in lower legs. Denies smoking. Pt is in paced ventricular rhythm on the cardiac monitor. Respirations even on room air.  ID: [id number]

## 2024-11-16 NOTE — ED PROVIDER NOTE - ATTENDING CONTRIBUTION TO CARE
I performed a history and physical exam of the patient and discussed their management with the resident. I reviewed the resident's note and agree with the documented findings and plan of care. My medical decision making and observations are found below.     75yo F with PMH PFpEF, HTN, s/p pacemaker, cva, dm ckd3 presenting with s/b a/w bl LE edema, cough. NOted to be tachypneic with trace edema to ankles, lungs with bibasilar crackles. Patient's XR reviewed and noted to have pulmonary edema,. Labs reviewed and patient found to have hyperkalemia, elevated pro-bnp, EKG without ischemia/signs of hyperkalemia. Likely acute congestive heart failure exacerbation. Given lasix, insulin/dextrose, lokelma, and admitted for fruther management. Nephro and cardiology consulted.

## 2024-11-16 NOTE — ED PROVIDER NOTE - CARE PLAN
Principal Discharge DX:	CHF exacerbation  Secondary Diagnosis:	BRADY (acute kidney injury)  Secondary Diagnosis:	Hyponatremia  Secondary Diagnosis:	Hyperkalemia   1

## 2024-11-16 NOTE — ED PROVIDER NOTE - CLINICAL SUMMARY MEDICAL DECISION MAKING FREE TEXT BOX
74y Lao speaking female with PMH of CAD s/p PCI, PAD with chronic RLE wound s/p debridement on 07/09, HFpEF, HTN, cardiac pacemaker for Mobitz type II, CVA, DM, presents with 3 days of progressively worsening RAMIREZ, orthopnea, PND, BLE swelling, and productive cough. Also reports 1 week of persistent substernal nonradiating chest pressure, improve somewhat with rest, unclear if worsens with exertion. No recent travel history, states compliant with medications. Vital stable, no acute distress, A&Ox3, no neuro deficits, satting well on RA, decreased breath sounds bilaterally secondary to poor resp effort, no wheezes or crackles, heart regular, +JVD, trace BLE edema. DDx include CHF exacerbation vs ACS vs PNA. Plan for labs, cardiac enzyme, CXR, O2. Will reassess. 74y Slovenian speaking female with PMH of CAD s/p PCI, PAD with chronic RLE wound s/p debridement on 07/09, HFpEF, HTN, cardiac pacemaker for Mobitz type II, CVA, DM, presents with 3 days of progressively worsening RAMIREZ, orthopnea, PND, BLE swelling, and productive cough. Also reports 1 week of persistent substernal nonradiating chest pressure, improve somewhat with rest, unclear if worsens with exertion. No recent travel history, states compliant with medications. Vital stable, no acute distress, A&Ox3, no neuro deficits, satting well on RA, decreased breath sounds bilaterally secondary to poor resp effort, no wheezes or crackles, heart regular, +JVD, trace BLE edema. EKG ventricular paced, no acute changes. DDx include CHF exacerbation vs ACS vs PNA. Plan for labs, cardiac enzyme, CXR, O2. Will reassess. 74y Yoruba speaking female with PMH of CAD s/p PCI, PAD with chronic RLE wound s/p debridement on 07/09, HFpEF, HTN, cardiac pacemaker for Mobitz type II, CVA, DM, CKD3, presents with 3 days of progressively worsening RAMIREZ, orthopnea, PND, BLE swelling, and productive cough. Also reports 1 week of persistent substernal nonradiating chest pressure, improve somewhat with rest, unclear if worsens with exertion. No recent travel history, states compliant with medications. Vital stable, no acute distress, A&Ox3, no neuro deficits, satting well on RA, decreased breath sounds bilaterally secondary to poor resp effort, no wheezes or crackles, heart regular, +JVD, trace BLE edema. EKG ventricular paced, no acute changes. DDx include CHF exacerbation vs ACS vs PNA. Plan for labs, cardiac enzyme, CXR, O2. Will reassess.

## 2024-11-16 NOTE — H&P ADULT - NSHPPHYSICALEXAM_GEN_ALL_CORE
T(C): 36.5 (11-16-24 @ 16:27), Max: 36.5 (11-16-24 @ 16:27)  HR: 72 (11-16-24 @ 18:43) (62 - 72)  BP: 148/56 (11-16-24 @ 18:43) (136/68 - 148/56)  RR: 20 (11-16-24 @ 16:27) (20 - 20)  SpO2: 93% (11-16-24 @ 16:27) (93% - 93%)    GENERAL: patient appears well, no acute distress, appropriate, pleasant  EYES: sclera clear, no exudates  ENMT: oropharynx clear without erythema, no exudates, moist mucous membranes  NECK: supple, soft, no thyromegaly noted  LUNGS: good air entry bilaterally, clear to auscultation, symmetric breath sounds, no wheezing or rhonchi appreciated  HEART: soft S1/S2, regular rate and rhythm, no murmurs noted, b/l pitting +2 ankle/feet edema   GASTROINTESTINAL: abdomen is soft, nontender, nondistended, normoactive bowel sounds, no palpable masses  INTEGUMENT: good skin turgor, warm skin, appears well perfused  MUSCULOSKELETAL: no clubbing or cyanosis, no obvious deformity  NEUROLOGIC: awake, alert, oriented x3, good muscle tone in 4 extremities, no obvious sensory deficits  PSYCHIATRIC: mood is good, affect is congruent, linear and logical thought process  HEME/LYMPH: no palpable supraclavicular nodules, no obvious ecchymosis or petechiae

## 2024-11-16 NOTE — ED ADULT NURSE REASSESSMENT NOTE - NS ED NURSE REASSESS COMMENT FT1
pt  received from Jayashree HENRIQUEZ VSS on RA as noted at this time NAD noted RR even unlabored some wheezing noted pt currently denies chest pain or sob. Pending ct at this time AND noted fall and safety precautions in place

## 2024-11-16 NOTE — ED PROVIDER NOTE - PHYSICAL EXAMINATION
GENERAL: No acute distress, laying in bed  HEENT: Atraumatic, normocephalic, non-icteric, JVD up to jaw   NECK: Supple, full range of motion, no observable masses  NEURO: A&Ox3, no focal deficits, moving all extremities spontaneously, no dysarthria, CN II-XII grossly intact  PSYCH: Normal affect, appropriate insight and judgment, fluent speech  LUNGS: Non-labored breathing, speaking in full sentences, diminished breath sounds bilaterally secondary to poor resp effort, no crackles or wheezes   HEART: RRR, no murmur appreciated  ABD: Soft, non-tender, non-distended, no appreciable masses  EXTREMITIES: Mild swelling of BLE up to ankle, trace pitting edema bilaterally   SKIN: No rashes, ecchymosis, lacerations

## 2024-11-16 NOTE — H&P ADULT - HISTORY OF PRESENT ILLNESS
73yo F with PMHx of CAD s/p PCI, HB s/p PPM, HFrEF, IDDM, HTN, HLD, CKD presented to ED c/o progressive sob/RAMIREZ with associated b/l LE swelling for the past 3 days. Pt also reports that intermittent substernal nonradiating chest pressure 3 days ago, which somewhat improved now. Family at bedside concern that pt has a lot of fluid retention as they noted that she also gained ~6lbs in  a weeks. Denies palpitation, syncope, n/v, fever, chills.      pt seen before midnight.     75yo F with PMHx of CAD s/p PCI, HB s/p PPM, HFrEF, IDDM, HTN, HLD, CKD presented to ED c/o progressive sob/RAMIREZ with associated b/l LE swelling for the past 3 days. Pt also reports that intermittent substernal nonradiating chest pressure 3 days ago, which somewhat improved now. Family at bedside concern that pt has a lot of fluid retention as they noted that she also gained ~6lbs in  a weeks. Denies palpitation, syncope, n/v, fever, chills.

## 2024-11-16 NOTE — CONSULT NOTE ADULT - ASSESSMENT
This is 75 y/o female with hx of CAD s/p PCI, HFrEF, Mobitz II s/p PPM (MD NereydaT), PAD with chronic right LE wound, CVA, DM2, CKD3, HTN who presents with SOB, leg edema and chest tightness x3 days. Pt states that she gets out of breath while walking. She gained 6 lbs over the last week and noticed that her legs are swelling. Blood work significant for hyperkalemia 6.3, Cr 1.55, proBNP 92928. CXR reveals pulmonary edema and increased small R pleural effusion. Pt was admitted last month with similar symptoms. Echocardiogram on 10/8/24 revealed EF 45-50% and new regional WMA. Pt was offered cardiac catheterization but declined due to renal concerns. She follows with Dr. Allen. This is 75 y/o female with hx of CAD, HFrEF, Mobitz II s/p PPM (MD NreeydaT), PAD with chronic right LE wound, CVA, DM2, CKD3, HTN who presents with SOB, leg edema and chest tightness x3 days. Pt states that she gets out of breath while walking. She gained 6 lbs over the last week and noticed that her legs are swelling. Blood work significant for hyperkalemia 6.3, Cr 1.55, proBNP 41979. CXR reveals pulmonary edema and increased small R pleural effusion. Pt was admitted last month with similar symptoms. Echocardiogram on 10/8/24 revealed EF 45-50% and new regional WMA. Pt was offered cardiac catheterization but declined due to renal concerns. She follows with Dr. Allen.

## 2024-11-16 NOTE — ED ADULT TRIAGE NOTE - CHIEF COMPLAINT QUOTE
pt arrives to the ED c/o shortness of breath and fluid retention, denies N/V/D, history of pacemaker

## 2024-11-16 NOTE — ED PROVIDER NOTE - OBJECTIVE STATEMENT
74y Georgian speaking female with PMH of CAD s/p PCI, PAD with chronic RLE wound s/p debridement on 07/09, HFpEF, HTN, cardiac pacemaker for Mobitz type II, CVA, DM, presents with 3 days of progressively worsening RAMIREZ, orthopnea, PND, BLE swelling, and productive cough. Also reports 1 week of persistent substernal nonradiating chest pressure, improve somewhat with rest, unclear if worsens with exertion. No recent travel history, states compliant with medications. Endorses subjective fever and chills. Denies abd pain, n/v/d, dysuria. 74y Greek speaking female with PMH of CAD s/p PCI, PAD with chronic RLE wound s/p debridement on 07/09, HFpEF, HTN, cardiac pacemaker for Mobitz type II, CVA, DM, CKD3, presents with 3 days of progressively worsening RAMIREZ, orthopnea, PND, BLE swelling, and productive cough. Also reports 1 week of persistent substernal nonradiating chest pressure, improve somewhat with rest, unclear if worsens with exertion. No recent travel history, states compliant with medications. Endorses subjective fever and chills. Denies abd pain, n/v/d, dysuria.

## 2024-11-16 NOTE — ED ADULT TRIAGE NOTE - TEMPERATURE IN CELSIUS (DEGREES C)
[facility]  Sleep Medicine  [facility], [address]  [place] [address]    Chief Complaint   Patient presents with    Sleep Apnea       Aster Jauregui is a 60 y.o. female who comes in from nursing home ([facility]), accompanied by aid for evaluation of previously diagnosed JOSE A. She was diagnosed several years ago (patient does not remember). Patient has been on PAP therapy since then.    Pertinent PMHx includes: JOSE A, GERD, depression, hypertension, severe obesity.    She was diagnosed with obstructive sleep apnea and is being treated with PAP therapy.   She has been on PAP therapy for several years.  She states she wears the PAP device most nights.   She goes to bed at 7-7:30am. She falls asleep in few minutes.  She awakens a few times per night  (to use the bathroom) and takes no naps. She does use sleep aid/s: zolpidem 10 mg nightly and mirtazapine 15 mg nightly. Symptoms of sleep apnea have improved since using PAP therapy.  She is snoring with the machine on.   DME: [employer]  Mask: AirFit [id number]  Machine: [employer]Med Airsense 10 Autoset        DATA REVIEWED TODAY:    Diagnostic Review  Records of previous sleep test report unavailable for my review at this time.      [place] & Insomnia Severity Index scores      7/25/2024    10:07 AM   Sleep Medicine   Sitting and reading 0   Watching TV 0   Sitting, inactive in a public place (e.g. a theatre or a meeting) 0   As a passenger in a car for an hour without a break 0   Lying down to rest in the afternoon when circumstances permit 0   Sitting and talking to someone 0   Sitting quietly after a lunch without alcohol 0   In a car, while stopped for a few minutes in traffic 0   [place] Sleepiness Score 0         7/25/2024    10:00 AM   Insomnia Severity Index (COV)   Difficulty falling asleep 0   Difficulty staying asleep 0   Problems waking up too early 0   How satisfied/dissatisfied are you with your CURRENT sleep pattern? 0   How NOTICEABLE to others  36.5

## 2024-11-17 LAB
ANION GAP SERPL CALC-SCNC: 17 MMOL/L — SIGNIFICANT CHANGE UP (ref 5–17)
BUN SERPL-MCNC: 39.2 MG/DL — HIGH (ref 8–20)
CALCIUM SERPL-MCNC: 9.3 MG/DL — SIGNIFICANT CHANGE UP (ref 8.4–10.5)
CHLORIDE SERPL-SCNC: 93 MMOL/L — LOW (ref 96–108)
CO2 SERPL-SCNC: 18 MMOL/L — LOW (ref 22–29)
CREAT SERPL-MCNC: 1.43 MG/DL — HIGH (ref 0.5–1.3)
EGFR: 39 ML/MIN/1.73M2 — LOW
GLUCOSE BLDC GLUCOMTR-MCNC: 110 MG/DL — HIGH (ref 70–99)
GLUCOSE BLDC GLUCOMTR-MCNC: 148 MG/DL — HIGH (ref 70–99)
GLUCOSE BLDC GLUCOMTR-MCNC: 163 MG/DL — HIGH (ref 70–99)
GLUCOSE BLDC GLUCOMTR-MCNC: 192 MG/DL — HIGH (ref 70–99)
GLUCOSE BLDC GLUCOMTR-MCNC: 97 MG/DL — SIGNIFICANT CHANGE UP (ref 70–99)
GLUCOSE SERPL-MCNC: 155 MG/DL — HIGH (ref 70–99)
HCT VFR BLD CALC: 29.2 % — LOW (ref 34.5–45)
HGB BLD-MCNC: 9.5 G/DL — LOW (ref 11.5–15.5)
LACTATE BLDV-MCNC: 2 MMOL/L — SIGNIFICANT CHANGE UP (ref 0.5–2)
MAGNESIUM SERPL-MCNC: 2 MG/DL — SIGNIFICANT CHANGE UP (ref 1.6–2.6)
MCHC RBC-ENTMCNC: 26.8 PG — LOW (ref 27–34)
MCHC RBC-ENTMCNC: 32.5 G/DL — SIGNIFICANT CHANGE UP (ref 32–36)
MCV RBC AUTO: 82.3 FL — SIGNIFICANT CHANGE UP (ref 80–100)
PLATELET # BLD AUTO: 425 K/UL — HIGH (ref 150–400)
POTASSIUM SERPL-MCNC: 4.9 MMOL/L — SIGNIFICANT CHANGE UP (ref 3.5–5.3)
POTASSIUM SERPL-SCNC: 4.9 MMOL/L — SIGNIFICANT CHANGE UP (ref 3.5–5.3)
RBC # BLD: 3.55 M/UL — LOW (ref 3.8–5.2)
RBC # FLD: 18.6 % — HIGH (ref 10.3–14.5)
SODIUM SERPL-SCNC: 128 MMOL/L — LOW (ref 135–145)
WBC # BLD: 11.52 K/UL — HIGH (ref 3.8–10.5)
WBC # FLD AUTO: 11.52 K/UL — HIGH (ref 3.8–10.5)

## 2024-11-17 PROCEDURE — 99233 SBSQ HOSP IP/OBS HIGH 50: CPT

## 2024-11-17 PROCEDURE — 71250 CT THORAX DX C-: CPT | Mod: 26

## 2024-11-17 PROCEDURE — 99223 1ST HOSP IP/OBS HIGH 75: CPT

## 2024-11-17 RX ADMIN — Medication 325 MILLIGRAM(S): at 13:43

## 2024-11-17 RX ADMIN — Medication 5000 UNIT(S): at 17:16

## 2024-11-17 RX ADMIN — RANOLAZINE 500 MILLIGRAM(S): 1000 TABLET, FILM COATED, EXTENDED RELEASE ORAL at 06:16

## 2024-11-17 RX ADMIN — Medication 5000 UNIT(S): at 06:17

## 2024-11-17 RX ADMIN — CLOPIDOGREL 75 MILLIGRAM(S): 75 TABLET, FILM COATED ORAL at 12:23

## 2024-11-17 RX ADMIN — Medication 325 MILLIGRAM(S): at 22:01

## 2024-11-17 RX ADMIN — FUROSEMIDE 40 MILLIGRAM(S): 40 TABLET ORAL at 13:45

## 2024-11-17 RX ADMIN — Medication 60 MILLIGRAM(S): at 12:24

## 2024-11-17 RX ADMIN — FUROSEMIDE 40 MILLIGRAM(S): 40 TABLET ORAL at 01:07

## 2024-11-17 RX ADMIN — RANOLAZINE 500 MILLIGRAM(S): 1000 TABLET, FILM COATED, EXTENDED RELEASE ORAL at 17:16

## 2024-11-17 RX ADMIN — FUROSEMIDE 40 MILLIGRAM(S): 40 TABLET ORAL at 06:17

## 2024-11-17 RX ADMIN — INSULIN GLARGINE 30 UNIT(S): 100 INJECTION, SOLUTION SUBCUTANEOUS at 22:03

## 2024-11-17 RX ADMIN — INSULIN GLARGINE 30 UNIT(S): 100 INJECTION, SOLUTION SUBCUTANEOUS at 01:24

## 2024-11-17 RX ADMIN — METOPROLOL TARTRATE 100 MILLIGRAM(S): 100 TABLET, FILM COATED ORAL at 06:16

## 2024-11-17 RX ADMIN — Medication 325 MILLIGRAM(S): at 06:16

## 2024-11-17 RX ADMIN — AMLODIPINE BESYLATE 10 MILLIGRAM(S): 10 TABLET ORAL at 06:19

## 2024-11-17 RX ADMIN — Medication 80 MILLIGRAM(S): at 22:01

## 2024-11-17 RX ADMIN — GABAPENTIN 300 MILLIGRAM(S): 300 CAPSULE ORAL at 22:01

## 2024-11-17 RX ADMIN — Medication 81 MILLIGRAM(S): at 12:23

## 2024-11-17 NOTE — PATIENT PROFILE ADULT - FALL HARM RISK - HARM RISK INTERVENTIONS

## 2024-11-18 LAB
ANION GAP SERPL CALC-SCNC: 16 MMOL/L — SIGNIFICANT CHANGE UP (ref 5–17)
BASOPHILS # BLD AUTO: 0.07 K/UL — SIGNIFICANT CHANGE UP (ref 0–0.2)
BASOPHILS NFR BLD AUTO: 0.7 % — SIGNIFICANT CHANGE UP (ref 0–2)
BUN SERPL-MCNC: 33.4 MG/DL — HIGH (ref 8–20)
CALCIUM SERPL-MCNC: 9.2 MG/DL — SIGNIFICANT CHANGE UP (ref 8.4–10.5)
CHLORIDE SERPL-SCNC: 103 MMOL/L — SIGNIFICANT CHANGE UP (ref 96–108)
CO2 SERPL-SCNC: 22 MMOL/L — SIGNIFICANT CHANGE UP (ref 22–29)
CREAT SERPL-MCNC: 1.3 MG/DL — SIGNIFICANT CHANGE UP (ref 0.5–1.3)
EGFR: 44 ML/MIN/1.73M2 — LOW
EOSINOPHIL # BLD AUTO: 0.34 K/UL — SIGNIFICANT CHANGE UP (ref 0–0.5)
EOSINOPHIL NFR BLD AUTO: 3.4 % — SIGNIFICANT CHANGE UP (ref 0–6)
GLUCOSE BLDC GLUCOMTR-MCNC: 182 MG/DL — HIGH (ref 70–99)
GLUCOSE BLDC GLUCOMTR-MCNC: 239 MG/DL — HIGH (ref 70–99)
GLUCOSE BLDC GLUCOMTR-MCNC: 267 MG/DL — HIGH (ref 70–99)
GLUCOSE BLDC GLUCOMTR-MCNC: 73 MG/DL — SIGNIFICANT CHANGE UP (ref 70–99)
GLUCOSE SERPL-MCNC: 70 MG/DL — SIGNIFICANT CHANGE UP (ref 70–99)
HCT VFR BLD CALC: 28.5 % — LOW (ref 34.5–45)
HGB BLD-MCNC: 9.2 G/DL — LOW (ref 11.5–15.5)
IMM GRANULOCYTES NFR BLD AUTO: 0.6 % — SIGNIFICANT CHANGE UP (ref 0–0.9)
LYMPHOCYTES # BLD AUTO: 1.66 K/UL — SIGNIFICANT CHANGE UP (ref 1–3.3)
LYMPHOCYTES # BLD AUTO: 16.6 % — SIGNIFICANT CHANGE UP (ref 13–44)
MAGNESIUM SERPL-MCNC: 2.2 MG/DL — SIGNIFICANT CHANGE UP (ref 1.6–2.6)
MCHC RBC-ENTMCNC: 26.2 PG — LOW (ref 27–34)
MCHC RBC-ENTMCNC: 32.3 G/DL — SIGNIFICANT CHANGE UP (ref 32–36)
MCV RBC AUTO: 81.2 FL — SIGNIFICANT CHANGE UP (ref 80–100)
MONOCYTES # BLD AUTO: 1.01 K/UL — HIGH (ref 0–0.9)
MONOCYTES NFR BLD AUTO: 10.1 % — SIGNIFICANT CHANGE UP (ref 2–14)
NEUTROPHILS # BLD AUTO: 6.88 K/UL — SIGNIFICANT CHANGE UP (ref 1.8–7.4)
NEUTROPHILS NFR BLD AUTO: 68.6 % — SIGNIFICANT CHANGE UP (ref 43–77)
PLATELET # BLD AUTO: 564 K/UL — HIGH (ref 150–400)
POTASSIUM SERPL-MCNC: 4.5 MMOL/L — SIGNIFICANT CHANGE UP (ref 3.5–5.3)
POTASSIUM SERPL-SCNC: 4.5 MMOL/L — SIGNIFICANT CHANGE UP (ref 3.5–5.3)
RBC # BLD: 3.51 M/UL — LOW (ref 3.8–5.2)
RBC # FLD: 18.8 % — HIGH (ref 10.3–14.5)
SODIUM SERPL-SCNC: 141 MMOL/L — SIGNIFICANT CHANGE UP (ref 135–145)
WBC # BLD: 10.02 K/UL — SIGNIFICANT CHANGE UP (ref 3.8–10.5)
WBC # FLD AUTO: 10.02 K/UL — SIGNIFICANT CHANGE UP (ref 3.8–10.5)

## 2024-11-18 PROCEDURE — 99233 SBSQ HOSP IP/OBS HIGH 50: CPT

## 2024-11-18 RX ORDER — OXYCODONE HYDROCHLORIDE 30 MG/1
2.5 TABLET ORAL EVERY 6 HOURS
Refills: 0 | Status: DISCONTINUED | OUTPATIENT
Start: 2024-11-18 | End: 2024-11-21

## 2024-11-18 RX ORDER — INSULIN GLARGINE 100 [IU]/ML
20 INJECTION, SOLUTION SUBCUTANEOUS AT BEDTIME
Refills: 0 | Status: DISCONTINUED | OUTPATIENT
Start: 2024-11-18 | End: 2024-11-21

## 2024-11-18 RX ADMIN — Medication 5000 UNIT(S): at 06:26

## 2024-11-18 RX ADMIN — Medication 81 MILLIGRAM(S): at 14:38

## 2024-11-18 RX ADMIN — INSULIN GLARGINE 20 UNIT(S): 100 INJECTION, SOLUTION SUBCUTANEOUS at 22:27

## 2024-11-18 RX ADMIN — OXYCODONE HYDROCHLORIDE 2.5 MILLIGRAM(S): 30 TABLET ORAL at 14:37

## 2024-11-18 RX ADMIN — CLOPIDOGREL 75 MILLIGRAM(S): 75 TABLET, FILM COATED ORAL at 14:38

## 2024-11-18 RX ADMIN — FUROSEMIDE 40 MILLIGRAM(S): 40 TABLET ORAL at 14:38

## 2024-11-18 RX ADMIN — Medication 325 MILLIGRAM(S): at 22:28

## 2024-11-18 RX ADMIN — METOPROLOL TARTRATE 100 MILLIGRAM(S): 100 TABLET, FILM COATED ORAL at 06:26

## 2024-11-18 RX ADMIN — RANOLAZINE 500 MILLIGRAM(S): 1000 TABLET, FILM COATED, EXTENDED RELEASE ORAL at 06:26

## 2024-11-18 RX ADMIN — GABAPENTIN 300 MILLIGRAM(S): 300 CAPSULE ORAL at 22:32

## 2024-11-18 RX ADMIN — Medication 325 MILLIGRAM(S): at 06:26

## 2024-11-18 RX ADMIN — RANOLAZINE 500 MILLIGRAM(S): 1000 TABLET, FILM COATED, EXTENDED RELEASE ORAL at 17:13

## 2024-11-18 RX ADMIN — AMLODIPINE BESYLATE 10 MILLIGRAM(S): 10 TABLET ORAL at 06:26

## 2024-11-18 RX ADMIN — Medication 3: at 17:14

## 2024-11-18 RX ADMIN — Medication 80 MILLIGRAM(S): at 22:28

## 2024-11-18 RX ADMIN — Medication 60 MILLIGRAM(S): at 14:38

## 2024-11-18 RX ADMIN — Medication 1: at 11:50

## 2024-11-18 RX ADMIN — Medication 5000 UNIT(S): at 17:13

## 2024-11-18 RX ADMIN — Medication 325 MILLIGRAM(S): at 14:38

## 2024-11-18 RX ADMIN — FUROSEMIDE 40 MILLIGRAM(S): 40 TABLET ORAL at 06:25

## 2024-11-18 NOTE — PROGRESS NOTE ADULT - PROBLEM SELECTOR PLAN 2
continue ASA/Plavix  continue Imdur and Ranexa  continue Toprol and Lipitor.  LHC 2021 with MVD and medical mangment  Risks/benefits of repeat LHC/  Previously refused due to risk of SALO.   Patient is more agreeable. continue ASA/Plavix  continue Imdur and Ranexa  continue Toprol and Lipitor.  LHC 2021 with MVD and medical mangment  Risks/benefits of repeat LHC  Previously refused due to risk of SALO.   Patient is more agreeable today.

## 2024-11-18 NOTE — PHYSICAL THERAPY INITIAL EVALUATION ADULT - PERTINENT HX OF CURRENT PROBLEM, REHAB EVAL
Pt is 73 y/o female with hx of CAD, HFrEF, Mobitz II s/p PPM (MD NereydaT), PAD with chronic right LE wound, CVA, DM2, CKD3, HTN who presents with SOB, leg edema and chest tightness x3 days. Pt states that she gets out of breath while walking. Admitted with acute on chronic CHF

## 2024-11-18 NOTE — PROGRESS NOTE ADULT - PROBLEM SELECTOR PLAN 3
d/c Valsartan  potassium 4.5 today  Educated on low potassium diet  nephrology consult d/c Valsartan.  potassium 4.5 today.  Educated on low potassium diet.  nephrology consult.

## 2024-11-18 NOTE — PROGRESS NOTE ADULT - PROBLEM SELECTOR PLAN 1
telemetry monitoring  Lasix 40 mg IV bid  -2380ml, slight crackles I left bases and decreased swelling - on room air  GDMT with Toprol XL 100mg.  Ct Isordil 60mg daily -  Will add hydralazine if unable to tolerate ACE/ARB/ARNI/MRA due to hyperkalemia to reduce afterload  Assess for SGLT2i on discharge   strict I&Os   daily weights  monitor renal function and electrolytes. telemetry monitoring  Lasix 40 mg IV bid  -2380ml, slight crackles I left bases and decreased swelling - on room air  GDMT with Toprol XL 100mg.  Ct Isordil 60mg daily.  Will add hydralazine if unable to tolerate ACE/ARB/ARNI/MRA due to hyperkalemia to reduce afterload  Assess for SGLT2i on discharge   strict I&Os   daily weights  monitor renal function and electrolytes.

## 2024-11-18 NOTE — PHYSICAL THERAPY INITIAL EVALUATION ADULT - ADDITIONAL COMMENTS
Pt reports independent PTA, owns RW, wheelchair. Lives with spouse in private home, no CLAUDIO or inside. Family can assist if needed.

## 2024-11-19 ENCOUNTER — TRANSCRIPTION ENCOUNTER (OUTPATIENT)
Age: 74
End: 2024-11-19

## 2024-11-19 LAB
ANION GAP SERPL CALC-SCNC: 13 MMOL/L — SIGNIFICANT CHANGE UP (ref 5–17)
BUN SERPL-MCNC: 27.7 MG/DL — HIGH (ref 8–20)
CALCIUM SERPL-MCNC: 9.2 MG/DL — SIGNIFICANT CHANGE UP (ref 8.4–10.5)
CHLORIDE SERPL-SCNC: 98 MMOL/L — SIGNIFICANT CHANGE UP (ref 96–108)
CO2 SERPL-SCNC: 23 MMOL/L — SIGNIFICANT CHANGE UP (ref 22–29)
CREAT SERPL-MCNC: 1.16 MG/DL — SIGNIFICANT CHANGE UP (ref 0.5–1.3)
EGFR: 50 ML/MIN/1.73M2 — LOW
GLUCOSE BLDC GLUCOMTR-MCNC: 226 MG/DL — HIGH (ref 70–99)
GLUCOSE BLDC GLUCOMTR-MCNC: 303 MG/DL — HIGH (ref 70–99)
GLUCOSE BLDC GLUCOMTR-MCNC: 98 MG/DL — SIGNIFICANT CHANGE UP (ref 70–99)
GLUCOSE SERPL-MCNC: 128 MG/DL — HIGH (ref 70–99)
HCT VFR BLD CALC: 29.7 % — LOW (ref 34.5–45)
HGB BLD-MCNC: 9.3 G/DL — LOW (ref 11.5–15.5)
MAGNESIUM SERPL-MCNC: 2.1 MG/DL — SIGNIFICANT CHANGE UP (ref 1.6–2.6)
MCHC RBC-ENTMCNC: 25.8 PG — LOW (ref 27–34)
MCHC RBC-ENTMCNC: 31.3 G/DL — LOW (ref 32–36)
MCV RBC AUTO: 82.5 FL — SIGNIFICANT CHANGE UP (ref 80–100)
PLATELET # BLD AUTO: 474 K/UL — HIGH (ref 150–400)
POTASSIUM SERPL-MCNC: 4 MMOL/L — SIGNIFICANT CHANGE UP (ref 3.5–5.3)
POTASSIUM SERPL-SCNC: 4 MMOL/L — SIGNIFICANT CHANGE UP (ref 3.5–5.3)
RBC # BLD: 3.6 M/UL — LOW (ref 3.8–5.2)
RBC # FLD: 18.5 % — HIGH (ref 10.3–14.5)
SODIUM SERPL-SCNC: 134 MMOL/L — LOW (ref 135–145)
WBC # BLD: 10.66 K/UL — HIGH (ref 3.8–10.5)
WBC # FLD AUTO: 10.66 K/UL — HIGH (ref 3.8–10.5)

## 2024-11-19 PROCEDURE — 99232 SBSQ HOSP IP/OBS MODERATE 35: CPT

## 2024-11-19 PROCEDURE — 99233 SBSQ HOSP IP/OBS HIGH 50: CPT

## 2024-11-19 PROCEDURE — 93458 L HRT ARTERY/VENTRICLE ANGIO: CPT | Mod: 26

## 2024-11-19 PROCEDURE — 99152 MOD SED SAME PHYS/QHP 5/>YRS: CPT

## 2024-11-19 RX ORDER — SODIUM CHLORIDE 9 MG/ML
125 INJECTION, SOLUTION INTRAMUSCULAR; INTRAVENOUS; SUBCUTANEOUS ONCE
Refills: 0 | Status: COMPLETED | OUTPATIENT
Start: 2024-11-19 | End: 2024-11-19

## 2024-11-19 RX ADMIN — SODIUM CHLORIDE 125 MILLILITER(S): 9 INJECTION, SOLUTION INTRAMUSCULAR; INTRAVENOUS; SUBCUTANEOUS at 10:40

## 2024-11-19 RX ADMIN — AMLODIPINE BESYLATE 10 MILLIGRAM(S): 10 TABLET ORAL at 05:53

## 2024-11-19 RX ADMIN — FUROSEMIDE 40 MILLIGRAM(S): 40 TABLET ORAL at 13:03

## 2024-11-19 RX ADMIN — ACETAMINOPHEN 500MG 650 MILLIGRAM(S): 500 TABLET, COATED ORAL at 23:00

## 2024-11-19 RX ADMIN — ACETAMINOPHEN 500MG 650 MILLIGRAM(S): 500 TABLET, COATED ORAL at 22:00

## 2024-11-19 RX ADMIN — Medication 5000 UNIT(S): at 17:07

## 2024-11-19 RX ADMIN — Medication 4: at 17:08

## 2024-11-19 RX ADMIN — SODIUM CHLORIDE 125 MILLILITER(S): 9 INJECTION, SOLUTION INTRAMUSCULAR; INTRAVENOUS; SUBCUTANEOUS at 07:50

## 2024-11-19 RX ADMIN — Medication 81 MILLIGRAM(S): at 05:52

## 2024-11-19 RX ADMIN — FUROSEMIDE 40 MILLIGRAM(S): 40 TABLET ORAL at 05:53

## 2024-11-19 RX ADMIN — CLOPIDOGREL 75 MILLIGRAM(S): 75 TABLET, FILM COATED ORAL at 05:52

## 2024-11-19 RX ADMIN — Medication 325 MILLIGRAM(S): at 21:55

## 2024-11-19 RX ADMIN — Medication 325 MILLIGRAM(S): at 05:53

## 2024-11-19 RX ADMIN — Medication 80 MILLIGRAM(S): at 21:55

## 2024-11-19 RX ADMIN — RANOLAZINE 500 MILLIGRAM(S): 1000 TABLET, FILM COATED, EXTENDED RELEASE ORAL at 17:08

## 2024-11-19 RX ADMIN — INSULIN GLARGINE 20 UNIT(S): 100 INJECTION, SOLUTION SUBCUTANEOUS at 21:55

## 2024-11-19 RX ADMIN — RANOLAZINE 500 MILLIGRAM(S): 1000 TABLET, FILM COATED, EXTENDED RELEASE ORAL at 05:52

## 2024-11-19 RX ADMIN — Medication 325 MILLIGRAM(S): at 13:03

## 2024-11-19 RX ADMIN — GABAPENTIN 300 MILLIGRAM(S): 300 CAPSULE ORAL at 21:55

## 2024-11-19 RX ADMIN — METOPROLOL TARTRATE 100 MILLIGRAM(S): 100 TABLET, FILM COATED ORAL at 05:52

## 2024-11-19 RX ADMIN — Medication 60 MILLIGRAM(S): at 12:37

## 2024-11-19 NOTE — DISCHARGE NOTE PROVIDER - NSDCCPTREATMENT_GEN_ALL_CORE_FT
PRINCIPAL PROCEDURE  Procedure: Left heart cardiac cath  Findings and Treatment: Go to the ED with any acute onset of chest pain, palpitations, shortness of breath or dizziness.   Managing risk factors will help prevent future blockages, risk factors may include: high blood pressure, high cholesterol, obesity, sedentary life style and smoking.    Your diet should be low in fat, cholesterol, salt and carbohydrates, increase fruits (caution if diabetic), vegetables and whole grains/fiber rich foods.   Take all your cardiac  medications as prescribed.    No heavy lifting, driving, sex, tub baths, swimming, or any activity that submerges the lower half of the body in water for 48 hours.  Limited walking and stairs for 48 hours.    Change the bandaid after 24 hours and every 24 hours after that.  Keep the puncture site dry and covered with a bandaid until a scab forms.    Observe the site frequently.  If bleeding or a large lump (the size of a golf ball or bigger) occurs lie flat, apply continuous direct pressure just above the puncture site for at least 10 minutes, and notify your physician immediately.  If the bleeding cannot be controlled, call 911 immediately for assistance.  Notify your physician of pain, swelling or any drainage.    Notify your physician immediately if coldness, numbness, discoloration or pain in your foot or arm occurs.  Restricted use with no heavy lifting of affected arm for 48 hours.  No submerging the arm in water for 48 hours.  You may start showering today.  Exercise is a very important factor in heart health. Once your post procedure restrictions have passed, you should engage in heart healthy, aerobic exercise. Be sure to have clearance from your cardiologist. Cardiac rehab programs could be extremely beneficial and your cardiologist could help set this up.   Follow up with your cardiologist within 1-2 weeks after your procedure.   Call your cardiologist or our unit ([phone]) with any questions or concerns that may arise.

## 2024-11-19 NOTE — DISCHARGE NOTE PROVIDER - HOSPITAL COURSE
73yo F with CAD s/p PCI, HB s/p PPM, HFrEF, IDDM, HTN, HLD, CKD admitted with acute on ch HF decompensation. Echo with multiple RWMAs. After aggressively diuresing pt she now appears euvolemic. Underwent LHC today confirming MVD. Patient started on IV lasix and now transitioned to PO alsix. Hospital c/b BRADY likely cardio-renal. Patient is medically optimized for discharge to home.

## 2024-11-19 NOTE — DISCHARGE NOTE PROVIDER - NSDCFUADDINST_GEN_ALL_CORE_FT
Restricted use with no heavy lifting of affected arm for 48 hours.  No submerging the arm in water for 48 hours.  You may start showering today.  Call your doctor for any bleeding, swelling, loss of sensation in the hand or fingers, or fingers turning blue.  If heavy bleeding or large lumps form, hold pressure at the spot and come to the Emergency Room.  No heavy lifting, driving, sex, tub baths, swimming, or any activity that submerges the lower half of the body in water for 48 hours.  Limited walking and stairs for 48 hours.    Change the bandaid after 24 hours and every 24 hours after that.  Keep the puncture site dry and covered with a bandaid until a scab forms.    Observe the site frequently.  If bleeding or a large lump (the size of a golf ball or bigger) occurs lie flat, apply continuous direct pressure just above the puncture site for at least 10 minutes, and notify your physician immediately.  If the bleeding cannot be controlled, call 911 immediately for assistance.  Notify your physician of pain, swelling or any drainage.    Notify your physician immediately if coldness, numbness, discoloration or pain in your foot occurs.

## 2024-11-19 NOTE — PROGRESS NOTE ADULT - PROBLEM SELECTOR PLAN 2
- continue ASA, Plavix, Ranexa and Imdur.   - continue Toprol and Lipitor.  - [facility] 2021 with MVD and medical management   - NPO since midnight for [facility] today, 11/19.

## 2024-11-19 NOTE — PROGRESS NOTE ADULT - PROBLEM SELECTOR PLAN 1
- Pt appears euvolemic at this time    - TTE: EF 45-50%. Multiple segmental abnormalities. LA mildly dilated. Mild TR. PASP 45 mmHg.   - GDMT: c/w lasix 40 mg IV push BID and metoprolol succinate  mg PO daily. Will add on additional GDMT after [facility].   - May transition to PO Lasix tomorrow if euvolemic.     - may add SGLTi2 upon d/c.   - Monitor on telemetry.  Strict i/o and daily weights.  Keep K > 4, Mg > 2.  Monitor renal function with ongoing diuresis.   - NPO since midnight for [facility] today, 11/19.

## 2024-11-19 NOTE — DISCHARGE NOTE PROVIDER - NSDCFUSCHEDAPPT_GEN_ALL_CORE_FT
Dylan Canchola  [facility] Physician [facility]  NEPHRO [address]  Scheduled Appointment: 12/05/2024    CortezoracionFransisco  [facility]  CARDIOLOGY [address]  Scheduled Appointment: 12/16/2024

## 2024-11-19 NOTE — DISCHARGE NOTE PROVIDER - NSDCCPCAREPLAN_GEN_ALL_CORE_FT
PRINCIPAL DISCHARGE DIAGNOSIS  Diagnosis: CHF exacerbation  Assessment and Plan of Treatment: Take your medications as prescribed. Monitor for symptoms of heart failure: increasing shortness of breath, inability to lie flat, swelling in your legs, increased abdomiinal girth or bloating, any significant increase in weight, increasing dizziness or fatigue and notify your provider.      SECONDARY DISCHARGE DIAGNOSES  Diagnosis: BRADY (acute kidney injury)  Assessment and Plan of Treatment:     Diagnosis: Hyponatremia  Assessment and Plan of Treatment:     Diagnosis: Hyperkalemia  Assessment and Plan of Treatment:      PRINCIPAL DISCHARGE DIAGNOSIS  Diagnosis: Acute on chronic respiratory failure with hypoxia  Assessment and Plan of Treatment: due to heart faliure      SECONDARY DISCHARGE DIAGNOSES  Diagnosis: BRADY (acute kidney injury)  Assessment and Plan of Treatment:     Diagnosis: Hyponatremia  Assessment and Plan of Treatment:     Diagnosis: Hyperkalemia  Assessment and Plan of Treatment:     Diagnosis: Acute on chronic HFrEF (heart failure with reduced ejection fraction)  Assessment and Plan of Treatment: Take your medications as prescribed. Monitor for symptoms of heart failure: increasing shortness of breath, inability to lie flat, swelling in your legs, increased abdomiinal girth or bloating, any significant increase in weight, increasing dizziness or fatigue and notify your provider.

## 2024-11-19 NOTE — PROGRESS NOTE ADULT - PROBLEM SELECTOR PLAN 3
- Potassium improving, today it is 4.0   - d/c Valsartan.   - Educated on low potassium diet.  - Renal function improving.

## 2024-11-19 NOTE — DISCHARGE NOTE PROVIDER - ATTENDING DISCHARGE PHYSICAL EXAMINATION:
Vital Signs Last 24 Hrs  T(F): 98.1 (21 Nov 2024 08:16), Max: 98.4 (20 Nov 2024 21:41)  HR: 70 (21 Nov 2024 08:16) (66 - 70)  BP: 128/58 (21 Nov 2024 08:16) (128/58 - 148/69)  RR: 16 (21 Nov 2024 08:16) (16 - 18)  SpO2: 100% (21 Nov 2024 08:16) (98% - 100%)    Physical Exam:  Constitutional: NAD, awake and alert  Respiratory: cta b/l no wheezing no rhonchi  Cardiovascular: +s1/s2 no edema b/l le  Gastrointestinal: soft nt nd bs+  Vascular: 2+ peripheral pulses  Neurological: A/O x 3, no focal deficits

## 2024-11-19 NOTE — DISCHARGE NOTE PROVIDER - CARE PROVIDER_API CALL
Travis Allen  Cardiovascular Disease  [address] [place] [address]  Phone: [phone]  Fax: [phone]  Follow Up Time: 1 week

## 2024-11-19 NOTE — DISCHARGE NOTE PROVIDER - NSDCMRMEDTOKEN_GEN_ALL_CORE_FT
amLODIPine 10 mg oral tablet: 1 tab(s) orally once a day  aspirin 81 mg oral delayed release tablet: 1 tab(s) orally once a day  atorvastatin 80 mg oral tablet: 1 tab(s) orally once a day (at bedtime)  clopidogrel 75 mg oral tablet: 1 tab(s) orally once a day  ferrous sulfate 325 mg (65 mg elemental iron) oral tablet: 1 tab(s) orally 3 times a day  gabapentin 300 mg oral capsule: 1 cap(s) orally once a day (at bedtime)  insulin glargine 100 units/mL subcutaneous solution: 30 unit(s) subcutaneous once a day (at bedtime)  isosorbide mononitrate 60 mg oral tablet, extended release: 1 tab(s) orally once a day  Lasix 20 mg oral tablet: 1 tab(s) orally once a day  metFORMIN 1000 mg oral tablet: 1 tab(s) orally 2 times a day (with meals)  metoprolol succinate 100 mg oral tablet, extended release: 1 tab(s) orally once a day  ranolazine 500 mg oral tablet, extended release: 1 tab(s) orally 2 times a day   amLODIPine 10 mg oral tablet: 1 tab(s) orally once a day  aspirin 81 mg oral delayed release tablet: 1 tab(s) orally once a day  atorvastatin 80 mg oral tablet: 1 tab(s) orally once a day (at bedtime)  clopidogrel 75 mg oral tablet: 1 tab(s) orally once a day  ferrous sulfate 325 mg (65 mg elemental iron) oral tablet: 1 tab(s) orally 3 times a day  furosemide 40 mg oral tablet: 1 tab(s) orally once a day  gabapentin 300 mg oral capsule: 1 cap(s) orally once a day (at bedtime)  insulin glargine 100 units/mL subcutaneous solution: 30 unit(s) subcutaneous once a day (at bedtime)  isosorbide mononitrate 60 mg oral tablet, extended release: 1 tab(s) orally once a day  metFORMIN 1000 mg oral tablet: 1 tab(s) orally 2 times a day (with meals)  metoprolol succinate 100 mg oral tablet, extended release: 1 tab(s) orally once a day  ranolazine 500 mg oral tablet, extended release: 1 tab(s) orally 2 times a day  sacubitril-valsartan 24 mg-26 mg oral tablet: 1 tab(s) orally 2 times a day

## 2024-11-20 LAB
ANION GAP SERPL CALC-SCNC: 15 MMOL/L — SIGNIFICANT CHANGE UP (ref 5–17)
BUN SERPL-MCNC: 28.5 MG/DL — HIGH (ref 8–20)
CALCIUM SERPL-MCNC: 8.5 MG/DL — SIGNIFICANT CHANGE UP (ref 8.4–10.5)
CHLORIDE SERPL-SCNC: 99 MMOL/L — SIGNIFICANT CHANGE UP (ref 96–108)
CO2 SERPL-SCNC: 23 MMOL/L — SIGNIFICANT CHANGE UP (ref 22–29)
CREAT SERPL-MCNC: 1.47 MG/DL — HIGH (ref 0.5–1.3)
EGFR: 38 ML/MIN/1.73M2 — LOW
GLUCOSE BLDC GLUCOMTR-MCNC: 157 MG/DL — HIGH (ref 70–99)
GLUCOSE BLDC GLUCOMTR-MCNC: 197 MG/DL — HIGH (ref 70–99)
GLUCOSE BLDC GLUCOMTR-MCNC: 244 MG/DL — HIGH (ref 70–99)
GLUCOSE BLDC GLUCOMTR-MCNC: 316 MG/DL — HIGH (ref 70–99)
GLUCOSE SERPL-MCNC: 197 MG/DL — HIGH (ref 70–99)
HCT VFR BLD CALC: 28.1 % — LOW (ref 34.5–45)
HGB BLD-MCNC: 9.1 G/DL — LOW (ref 11.5–15.5)
MAGNESIUM SERPL-MCNC: 2.3 MG/DL — SIGNIFICANT CHANGE UP (ref 1.6–2.6)
MCHC RBC-ENTMCNC: 26.2 PG — LOW (ref 27–34)
MCHC RBC-ENTMCNC: 32.4 G/DL — SIGNIFICANT CHANGE UP (ref 32–36)
MCV RBC AUTO: 81 FL — SIGNIFICANT CHANGE UP (ref 80–100)
PLATELET # BLD AUTO: 520 K/UL — HIGH (ref 150–400)
POTASSIUM SERPL-MCNC: 4.1 MMOL/L — SIGNIFICANT CHANGE UP (ref 3.5–5.3)
POTASSIUM SERPL-SCNC: 4.1 MMOL/L — SIGNIFICANT CHANGE UP (ref 3.5–5.3)
RBC # BLD: 3.47 M/UL — LOW (ref 3.8–5.2)
RBC # FLD: 18.6 % — HIGH (ref 10.3–14.5)
SODIUM SERPL-SCNC: 137 MMOL/L — SIGNIFICANT CHANGE UP (ref 135–145)
WBC # BLD: 10.01 K/UL — SIGNIFICANT CHANGE UP (ref 3.8–10.5)
WBC # FLD AUTO: 10.01 K/UL — SIGNIFICANT CHANGE UP (ref 3.8–10.5)

## 2024-11-20 PROCEDURE — 99232 SBSQ HOSP IP/OBS MODERATE 35: CPT

## 2024-11-20 RX ORDER — FUROSEMIDE 40 MG/1
40 TABLET ORAL DAILY
Refills: 0 | Status: DISCONTINUED | OUTPATIENT
Start: 2024-11-20 | End: 2024-11-21

## 2024-11-20 RX ORDER — SACUBITRIL AND VALSARTAN 24; 26 MG/1; MG/1
1 TABLET, FILM COATED ORAL
Refills: 0 | Status: DISCONTINUED | OUTPATIENT
Start: 2024-11-20 | End: 2024-11-21

## 2024-11-20 RX ORDER — ACETYLCYSTEINE
1200 POWDER (GRAM) MISCELLANEOUS EVERY 12 HOURS
Refills: 0 | Status: DISCONTINUED | OUTPATIENT
Start: 2024-11-20 | End: 2024-11-21

## 2024-11-20 RX ADMIN — Medication 2: at 13:20

## 2024-11-20 RX ADMIN — Medication 325 MILLIGRAM(S): at 05:32

## 2024-11-20 RX ADMIN — Medication 325 MILLIGRAM(S): at 21:38

## 2024-11-20 RX ADMIN — METOPROLOL TARTRATE 100 MILLIGRAM(S): 100 TABLET, FILM COATED ORAL at 05:32

## 2024-11-20 RX ADMIN — CLOPIDOGREL 75 MILLIGRAM(S): 75 TABLET, FILM COATED ORAL at 13:20

## 2024-11-20 RX ADMIN — Medication 5000 UNIT(S): at 05:31

## 2024-11-20 RX ADMIN — SACUBITRIL AND VALSARTAN 1 TABLET(S): 24; 26 TABLET, FILM COATED ORAL at 17:27

## 2024-11-20 RX ADMIN — Medication 325 MILLIGRAM(S): at 13:20

## 2024-11-20 RX ADMIN — Medication 1: at 17:45

## 2024-11-20 RX ADMIN — INSULIN GLARGINE 20 UNIT(S): 100 INJECTION, SOLUTION SUBCUTANEOUS at 21:39

## 2024-11-20 RX ADMIN — RANOLAZINE 500 MILLIGRAM(S): 1000 TABLET, FILM COATED, EXTENDED RELEASE ORAL at 17:27

## 2024-11-20 RX ADMIN — Medication 5000 UNIT(S): at 17:27

## 2024-11-20 RX ADMIN — Medication 1200 MILLIGRAM(S): at 19:29

## 2024-11-20 RX ADMIN — Medication 60 MILLIGRAM(S): at 13:19

## 2024-11-20 RX ADMIN — AMLODIPINE BESYLATE 10 MILLIGRAM(S): 10 TABLET ORAL at 05:32

## 2024-11-20 RX ADMIN — Medication 1: at 09:38

## 2024-11-20 RX ADMIN — RANOLAZINE 500 MILLIGRAM(S): 1000 TABLET, FILM COATED, EXTENDED RELEASE ORAL at 05:32

## 2024-11-20 RX ADMIN — FUROSEMIDE 40 MILLIGRAM(S): 40 TABLET ORAL at 05:32

## 2024-11-20 RX ADMIN — Medication 80 MILLIGRAM(S): at 21:39

## 2024-11-20 RX ADMIN — FUROSEMIDE 40 MILLIGRAM(S): 40 TABLET ORAL at 13:22

## 2024-11-20 RX ADMIN — Medication 81 MILLIGRAM(S): at 13:19

## 2024-11-20 RX ADMIN — GABAPENTIN 300 MILLIGRAM(S): 300 CAPSULE ORAL at 21:39

## 2024-11-20 NOTE — PROGRESS NOTE ADULT - PROBLEM SELECTOR PLAN 2
- continue ASA, Plavix, Ranexa and Imdur.   - continue Toprol and Lipitor.  - [facility] 2021 with MVD and medical management   - s/p LHC on 11/19 which showed severe multivessel CAD of the OM (unchanged from 2021), distal LAD (diffuse, severe, progressed since 2021) and new severe mid-distal RCA disease collateralized from left  - cardiac rehab info provided/referral and communication to cardiac rehab completed

## 2024-11-20 NOTE — CONSULT NOTE ADULT - SUBJECTIVE AND OBJECTIVE BOX
HPI:  75yo F with PMHx of CAD s/p PCI, HB s/p PPM, HFrEF, IDDM, HTN, HLD, CKD presented to ED c/o progressive sob/RAMIREZ with associated b/l LE swelling for the past 3 days. Pt also reports that intermittent substernal nonradiating chest pressure 3 days ago, which somewhat improved now. Family at bedside concern that pt has a lot of fluid retention as they noted that she also gained ~6lbs in  a weeks. Denies palpitation, syncope, n/v, fever, chills.       PAST MEDICAL & SURGICAL HISTORY:  DM (diabetes mellitus)      HTN (hypertension)      H/O gastroesophageal reflux (GERD)      Cardiac pacemaker  MICRA for mobitz type 11      CVA (cerebrovascular accident)  resdiual right sided weakness      CVA (cerebrovascular accident)      PAD (peripheral artery disease)      Wound of right leg      History of benign brain tumor      Cataract      History of biopsy      Cardiac pacemaker      S/P angiogram of extremity      FAMILY HISTORY:  FH: asthma  Son    [place]    Social History:Non smoker    MEDICATIONS  (STANDING):  amLODIPine   Tablet 10 milliGRAM(s) Oral daily  aspirin enteric coated 81 milliGRAM(s) Oral daily  atorvastatin 80 milliGRAM(s) Oral at bedtime  clopidogrel Tablet 75 milliGRAM(s) Oral daily  dextrose 5%. 1000 milliLiter(s) (50 mL/Hr) IV Continuous <Continuous>  dextrose 5%. 1000 milliLiter(s) (100 mL/Hr) IV Continuous <Continuous>  dextrose 50% Injectable 25 Gram(s) IV Push once  dextrose 50% Injectable 12.5 Gram(s) IV Push once  dextrose 50% Injectable 25 Gram(s) IV Push once  ferrous    sulfate 325 milliGRAM(s) Oral three times a day  furosemide    Tablet 40 milliGRAM(s) Oral daily  gabapentin 300 milliGRAM(s) Oral at bedtime  glucagon  Injectable 1 milliGRAM(s) IntraMuscular once  heparin   Injectable 5000 Unit(s) SubCutaneous every 12 hours  insulin glargine Injectable (LANTUS) 20 Unit(s) SubCutaneous at bedtime  insulin lispro (ADMELOG) corrective regimen sliding scale   SubCutaneous three times a day before meals  isosorbide   mononitrate ER Tablet (IMDUR) 60 milliGRAM(s) Oral daily  metoprolol succinate  milliGRAM(s) Oral daily  ranolazine 500 milliGRAM(s) Oral two times a day  sacubitril 24 mG/valsartan 26 mG 1 Tablet(s) Oral two times a day    MEDICATIONS  (PRN):  acetaminophen     Tablet .. 650 milliGRAM(s) Oral every 6 hours PRN Temp greater or equal to 38C (100.4F), Mild Pain (1 - 3)  aluminum hydroxide/magnesium hydroxide/simethicone Suspension 30 milliLiter(s) Oral every 4 hours PRN Dyspepsia  dextrose Oral Gel 15 Gram(s) Oral once PRN Blood Glucose LESS THAN 70 milliGRAM(s)/deciliter  melatonin 3 milliGRAM(s) Oral at bedtime PRN Insomnia  ondansetron Injectable 4 milliGRAM(s) IV Push every 8 hours PRN Nausea and/or Vomiting  oxyCODONE    IR 2.5 milliGRAM(s) Oral every 6 hours PRN Severe Pain (7 - 10)   Meds reviewed      Vital Signs Last 24 Hrs  T(C): 36.5 (2024 08:29), Max: 36.8 (2024 16:41)  T(F): 97.7 (2024 08:29), Max: 98.3 (2024 16:41)  HR: 71 (2024 13:19) (66 - 78)  BP: 154/72 (2024 13:19) (118/60 - 158/71)  BP(mean): 99 (2024 13:19) (99 - 100)  RR: 20 (2024 13:19) (18 - 20)  SpO2: 100% (2024 13:19) (97% - 100%)    Parameters below as of 2024 13:19  Patient On (Oxygen Delivery Method): room air      Daily     Daily Weight in k.6 (2024 07:02)    PHYSICAL EXAM:    GENERAL: appears chronically ill, NAD  HEAD:  Atraumatic, Normocephalic  EYES: EOMI  NECK: Supple  NERVOUS SYSTEM:  Alert & Oriented X3  CHEST/LUNG: Clear to percussion bilaterally  HEART: Regular rate and rhythm  ABDOMEN: Soft, Nontender, Nondistended; +BS  EXTREMITIES:  edema    LABS:                        9.1    10.01 )-----------( 520      ( 2024 05:06 )             28.1     11-20    137  |  99  |  28.5[H]  ----------------------------<  197[H]  4.1   |  23.0  |  1.47[H]    Ca    8.5      2024 05:06  Mg     2.3             Urinalysis Basic - ( 2024 05:06 )    Color: x / Appearance: x / SG: x / pH: x  Gluc: 197 mg/dL / Ketone: x  / Bili: x / Urobili: x   Blood: x / Protein: x / Nitrite: x   Leuk Esterase: x / RBC: x / WBC x   Sq Epi: x / Non Sq Epi: x / Bacteria: x      Magnesium: 2.3 mg/dL ( @ 05:06)          RADIOLOGY & ADDITIONAL TESTS:     HPI:  75yo F with PMHx of CAD s/p PCI, HB s/p PPM, HFrEF, IDDM, HTN, HLD, CKD presented to ED c/o progressive sob/RAMIREZ with associated b/l LE swelling for the past 3 days. Pt also reports that intermittent substernal nonradiating chest pressure 3 days ago, which somewhat improved now. Family at bedside concern that pt has a lot of fluid retention as they noted that she also gained ~6lbs in  a weeks. Denies palpitation, syncope, n/v, fever, chills.       PAST MEDICAL & SURGICAL HISTORY:  DM (diabetes mellitus)      HTN (hypertension)      H/O gastroesophageal reflux (GERD)      Cardiac pacemaker  MICRA for mobitz type 11      CVA (cerebrovascular accident)  resdiual right sided weakness      CVA (cerebrovascular accident)      PAD (peripheral artery disease)      Wound of right leg      History of benign brain tumor      Cataract      History of biopsy      Cardiac pacemaker      S/P angiogram of extremity      FAMILY HISTORY:  FH: asthma  Son    [place]    Social History:Non smoker    MEDICATIONS  (STANDING):  amLODIPine   Tablet 10 milliGRAM(s) Oral daily  aspirin enteric coated 81 milliGRAM(s) Oral daily  atorvastatin 80 milliGRAM(s) Oral at bedtime  clopidogrel Tablet 75 milliGRAM(s) Oral daily  dextrose 5%. 1000 milliLiter(s) (50 mL/Hr) IV Continuous <Continuous>  dextrose 5%. 1000 milliLiter(s) (100 mL/Hr) IV Continuous <Continuous>  dextrose 50% Injectable 25 Gram(s) IV Push once  dextrose 50% Injectable 12.5 Gram(s) IV Push once  dextrose 50% Injectable 25 Gram(s) IV Push once  ferrous    sulfate 325 milliGRAM(s) Oral three times a day  furosemide    Tablet 40 milliGRAM(s) Oral daily  gabapentin 300 milliGRAM(s) Oral at bedtime  glucagon  Injectable 1 milliGRAM(s) IntraMuscular once  heparin   Injectable 5000 Unit(s) SubCutaneous every 12 hours  insulin glargine Injectable (LANTUS) 20 Unit(s) SubCutaneous at bedtime  insulin lispro (ADMELOG) corrective regimen sliding scale   SubCutaneous three times a day before meals  isosorbide   mononitrate ER Tablet (IMDUR) 60 milliGRAM(s) Oral daily  metoprolol succinate  milliGRAM(s) Oral daily  ranolazine 500 milliGRAM(s) Oral two times a day  sacubitril 24 mG/valsartan 26 mG 1 Tablet(s) Oral two times a day    MEDICATIONS  (PRN):  acetaminophen     Tablet .. 650 milliGRAM(s) Oral every 6 hours PRN Temp greater or equal to 38C (100.4F), Mild Pain (1 - 3)  aluminum hydroxide/magnesium hydroxide/simethicone Suspension 30 milliLiter(s) Oral every 4 hours PRN Dyspepsia  dextrose Oral Gel 15 Gram(s) Oral once PRN Blood Glucose LESS THAN 70 milliGRAM(s)/deciliter  melatonin 3 milliGRAM(s) Oral at bedtime PRN Insomnia  ondansetron Injectable 4 milliGRAM(s) IV Push every 8 hours PRN Nausea and/or Vomiting  oxyCODONE    IR 2.5 milliGRAM(s) Oral every 6 hours PRN Severe Pain (7 - 10)   Meds reviewed      Vital Signs Last 24 Hrs  T(C): 36.5 (2024 08:29), Max: 36.8 (2024 16:41)  T(F): 97.7 (2024 08:29), Max: 98.3 (2024 16:41)  HR: 71 (2024 13:19) (66 - 78)  BP: 154/72 (2024 13:19) (118/60 - 158/71)  BP(mean): 99 (2024 13:19) (99 - 100)  RR: 20 (2024 13:19) (18 - 20)  SpO2: 100% (2024 13:19) (97% - 100%)    Parameters below as of 2024 13:19  Patient On (Oxygen Delivery Method): room air      Daily     Daily Weight in k.6 (2024 07:02)    PHYSICAL EXAM:    GENERAL: appears chronically ill, NAD  HEAD:  Atraumatic, Normocephalic  EYES: EOMI  NECK: Supple  NERVOUS SYSTEM:  Alert & Oriented X3  CHEST/LUNG: Clear to percussion bilaterally  HEART: Regular rate and rhythm  ABDOMEN: Soft, Nontender, Nondistended; +BS  EXTREMITIES:  trace edema    LABS:                        9.1    10.01 )-----------( 520      ( 2024 05:06 )             28.1     11-20    137  |  99  |  28.5[H]  ----------------------------<  197[H]  4.1   |  23.0  |  1.47[H]    Ca    8.5      2024 05:06  Mg     2.3             Urinalysis Basic - ( 2024 05:06 )    Color: x / Appearance: x / SG: x / pH: x  Gluc: 197 mg/dL / Ketone: x  / Bili: x / Urobili: x   Blood: x / Protein: x / Nitrite: x   Leuk Esterase: x / RBC: x / WBC x   Sq Epi: x / Non Sq Epi: x / Bacteria: x      Magnesium: 2.3 mg/dL ( @ 05:06)          RADIOLOGY & ADDITIONAL TESTS:

## 2024-11-20 NOTE — PROGRESS NOTE ADULT - TIME BILLING
History, vitals, exam, meds, labs, cardiac images, ECG, and tele were reviewed.  Patient was agreeable and appreciative of the care provided.
History, vitals, exam, meds, labs, cardiac images, ECG, and tele were reviewed.    Patient was agreeable and appreciative of the care provided.    CAD s/p PCI, heart block s/p PPM, HFrEF, IDDM, HTN, HLD, CKD presented to ED c/o progressive sob/RAMIREZ with associated b/l LE swelling.
--Continue IV Lasix 40 BID for today, consider transitioning to oral diuretics tomorrow.   --Tele monitoring  --Strict in and Out  --Daily weights  --Fluid restriction  --[facility]    History, vitals, exam, meds, labs, cardiac images, ECG, and tele were reviewed.  Patient was agreeable and appreciative of the care provided.

## 2024-11-20 NOTE — PROGRESS NOTE ADULT - NS ATTEND AMEND GEN_ALL_CORE FT
Ms. Barb Ko is a 74-year-old woman with a medical/surgical history of CAD s/p PCI, heart block s/p PPM, HFrEF, IDDM, HTN, HLD, CKD presented to ED c/o progressive sob/RAMIREZ with associated b/l LE swelling.     Cardiology was consulted for heart failure.     -A1c 7.1% on 10/9/2024  -Troponin 27-32-30  -NT-proBNP 16K on 11/16/2024  -ECG V pacing complexes, complete heart block with ventricular rate of 64 bpm  -TTE 10/8/2024 Left ventricular cavity is normal in size. Left ventricular systolic function is mildly decreased with an ejection fraction of 47 % by Taylor's method of disks with an ejection fraction visually estimated at 45 to 50 %. Regional wall motion abnormalities present. Multiple segmental abnormalities exist. See findings. Unable to assess left ventricular diastolic function due to insufficient data. The right ventricle is not well visualized but probably normal right ventricular cavity size and normal right ventricular systolic function. Left atrium is mildly dilated. Mild tricuspid regurgitation. Thickened mitral valve leaflets. There is mild anterior calcification and mild posterior calcification of the mitral valve annulus. Fibrocalcific aortic valve sclerosis without stenosis. Estimated pulmonary artery systolic pressure is 45 mmHg. No pericardial effusion seen. Compared to the transthoracic echocardiogram performed on 11/25/2021, there are new regional wall motion abnormalities present and newly reduced LVEF.  -CXR 11/16/2024 Pulmonary edema. Increased small right pleural effusion and right peripheral lung opacity, possibly representing loculated fluid.  -CT chest 11/17/2024 Findings compatible with interstitial pulmonary edema or volume overload. Small bilateral pleural effusions. Right lower hemithorax calcified pleural plaques. Partially imaged distended gallbladder containing gallstones within the dependent portion. Consider right upper quadrant ultrasound to exclude gallbladder disease.  -Cath 11/19/2024 per report there is multivessel CAD of the OM, dLAD disease, and new severe mid - distal RCA disease collateralized from the left. Per interventional cardiologist, Dr. Ely, would recommnd GDMT and staged PCI to RCA.     Impressions  -Multivessel CAD. Currently free of angina.   -HFmrEF, ICM.   -heart block s/p PPM.  -IDDM.  -HTN.  -HLD.  -Mild TR.   -Thickened mitral valve leaflets. There is mild anterior calcification and mild posterior calcification of the mitral valve annulus.   -Fibrocalcific aortic valve sclerosis without stenosis.  -CKD.    Recommendations  - Continue metoprolol succinate  mg PO daily.  - Continue furosemide 40 mg daily.  - May be able to add dapagliflozin 10 mg daily if renal function remains stable  - Continue Entresto 24/26 mg PO BID.   - Can follow up outpatient to start MRA.  - Monitor on tele.  Strict i/o and daily weights.  Keep K > 4, Mg > 2.  Monitor renal function with ongoing diuresis.   - s/p LHC on 11/19 which showed severe multivessel CAD of the OM (unchanged from 2021), distal LAD (diffuse, severe, progressed since 2021) and new severe mid-distal RCA disease collateralized from left;  Will need staged PCI to RCA per interventional cardiology (follows with Dr. Allen who can help arrange in the clinic setting). Continue asa, statin and clopidogrel  -Continue Norvasc, Imdur and Ranexa    No further inpatient cardiac work up at this time.  Will sign off.  Please reconsult if needed.
74-year-old woman with CAD s/p PCI, heart block s/p PPM, HFrEF, IDDM, HTN, HLD, CKD presented to ED c/o progressive sob/RAMIREZ with associated b/l LE swelling for the past 3 days.     -HFrEF. Continue IV Lasix 40 BID for today, consider transitioning to oral diuretics tomorrow.  Tele monitoring. Strict in and Out. Daily weights. Fluid restriction. [facility] tomorrow for HFrEF workup, she is amenable to our recommendation.   -CAD s/p PCI. Continue ASA/Plavix, Ranexa and atorvastatin.  -HTN. Continue Amlodipine, Imdur and Metoprolol.  -DM  -Renal failure on CKD3 with metabolic acidosis, cr improving.     We will follow.
Ms. Barb Ko is a 74-year-old woman with a medical/surgical history of CAD s/p PCI, heart block s/p PPM, HFrEF, IDDM, HTN, HLD, CKD presented to ED c/o progressive sob/RAMIREZ with associated b/l LE swelling.     Cardiology was consulted for heart failure.     -A1c 7.1% on 10/9/2024  -Troponin 27-32-30  -NT-proBNP 16K on 11/16/2024  -ECG V pacing complexes, complete heart block with ventricular rate of 64 bpm  -TTE 10/8/2024 Left ventricular cavity is normal in size. Left ventricular systolic function is mildly decreased with an ejection fraction of 47 % by Taylor's method of disks with an ejection fraction visually estimated at 45 to 50 %. Regional wall motion abnormalities present. Multiple segmental abnormalities exist. See findings. Unable to assess left ventricular diastolic function due to insufficient data. The right ventricle is not well visualized but probably normal right ventricular cavity size and normal right ventricular systolic function. Left atrium is mildly dilated. Mild tricuspid regurgitation. Thickened mitral valve leaflets. There is mild anterior calcification and mild posterior calcification of the mitral valve annulus. Fibrocalcific aortic valve sclerosis without stenosis. Estimated pulmonary artery systolic pressure is 45 mmHg. No pericardial effusion seen. Compared to the transthoracic echocardiogram performed on 11/25/2021, there are new regional wall motion abnormalities present and newly reduced LVEF.  -CXR 11/16/2024 Pulmonary edema. Increased small right pleural effusion and right peripheral lung opacity, possibly representing loculated fluid.  -CT chest 11/17/2024 Findings compatible with interstitial pulmonary edema or volume overload. Small bilateral pleural effusions. Right lower hemithorax calcified pleural plaques. Partially imaged distended gallbladder containing gallstones within the dependent portion. Consider right upper quadrant ultrasound to exclude gallbladder disease.  -Cath 11/19/2024 per report there is multivessel CAD of the OM, dLAD disease, and new severe mid - distal RCA disease collateralized from the left. Per interventional cardiologist, Dr. Ely, would recommnd GDMT and staged PCI to RCA.     Impressions  -Multivessel CAD. Currently free of angina.   -HFmrEF, ICM.   -heart block s/p PPM.  -IDDM.  -HTN.  -HLD.  -Mild TR.   -Thickened mitral valve leaflets. There is mild anterior calcification and mild posterior calcification of the mitral valve annulus.   -Fibrocalcific aortic valve sclerosis without stenosis.  -CKD.    Recommendations  - GDMT: continue metoprolol succinate daily. Will continue to add on additional GDMT after LHC if BP / renal function is stable.   - The decision for a staged PCI to treat the newly identified severe mid-distal RCA disease is prudent, especially given her heart failure and existing   CAD. Continuation of antiplatelet therapy (ASA, Plavix) along with the use of other antianginal medications (Ranexa, Imdur) will be crucial in   stabilizing her condition pre- and post-PCI.  - Continue Norvasc and Lipitor. Can check lipids.   - Monitor on tele. Continuous monitoring due to her complex cardiac history, including complete heart block managed with a pacemaker, is   necessary. This will help in the early detection of any arrhythmias or hemodynamic instability.  - Strict i/o and daily weights.  Keep K > 4, Mg > 2.  Monitor renal function with ongoing diuresis.   - Consider right upper quadrant ultrasound to exclude gallbladder disease per radiology read on a CT chest from 11/17/2024.   - Will need staged PCI to RCA (follows with Dr. Allen who can help arrange in the clinic setting).    The patient agreed with the plan. We will continue to follow along.

## 2024-11-20 NOTE — PROGRESS NOTE ADULT - PROBLEM SELECTOR PLAN 1
None - Pt appears euvolemic at this time    - TTE: EF 45-50%. Multiple segmental abnormalities. LA mildly dilated. Mild TR. PASP 45 mmHg.   - GDMT: c/w lmetoprolol succinate  mg PO daily.  - Transition to PO lasix 40 mg daily today   - may add SGLTi2 upon d/c.   - add on additional GDMT once renal function improves. Can add on entresto 24/26 mg PO BID, if not cost prohibitive.   - can follow up outpatient to start MRA as pt came in with hyperkalemia, hold off on starting MRA at this time.   - Monitor on telemetry.  Strict i/o and daily weights.  Keep K > 4, Mg > 2.  Monitor renal function with ongoing diuresis.   - s/p LHC on 11/19 which showed severe multivessel CAD of the OM (unchanged from 2021), distal LAD (diffuse, severe, progressed since 2021) and new severe mid-distal RCA disease collateralized from left - Pt appears euvolemic at this time    - TTE: EF 45-50%. Multiple segmental abnormalities. LA mildly dilated. Mild TR. PASP 45 mmHg.   - GDMT: c/w lmetoprolol succinate  mg PO daily.  - Transition to PO lasix 40 mg daily today   - may add SGLTi2 upon d/c.   - add on additional GDMT once renal function improves. Can add on entresto 24/26 mg PO BID, if not cost prohibitive.   - can follow up outpatient to start MRA as pt came in with hyperkalemia, hold off on starting MRA at this time.   - Monitor on telemetry.  Strict i/o and daily weights.  Keep K > 4, Mg > 2.  Monitor renal function with ongoing diuresis.   - s/p LHC on 11/19 which showed severe multivessel CAD of the OM (unchanged from 2021), distal LAD (diffuse, severe, progressed since 2021) and new severe mid-distal RCA disease collateralized from left    No further inpatient cardiac work up at this time.  Will sign off.  Please reconsult if needed.

## 2024-11-20 NOTE — CONSULT NOTE ADULT - ASSESSMENT
75yo F with PMHx of CAD s/p PCI, HB s/p PPM, HFrEF, IDDM, HTN, HLD, CKD presented to ED c/o progressive sob/RAMIREZ with associated b/l LE swelling     BRADY on CKD stage III  Agree w Lasix 40 mg po Q day  Will need to accept a degree of azotemia  Renal US on 10/13 no hydronephrosis   ok to cont Entresto for now  s/p LHC today possible SALO contributing  will give Mucomyst     HyperKalemia better ARB stopped    Renally dose meds, avoid nephrotoxic agents  Monitor labs will follow   75yo F with PMHx of CAD s/p PCI, HB s/p PPM, HFrEF, IDDM, HTN, HLD, CKD presented to ED c/o progressive sob/RAMIREZ with associated b/l LE swelling     BRADY on CKD stage III  Agree w Lasix 40 mg po Q day  Will need to accept a degree of azotemia  Renal US on 10/13 no hydronephrosis   ok to cont Entresto for now  s/p LHC today possible SALO contributing  will give Mucomyst     HyperKalemia better ARB stopped- better    Renally dose meds, avoid nephrotoxic agents  Monitor labs will follow

## 2024-11-21 ENCOUNTER — TRANSCRIPTION ENCOUNTER (OUTPATIENT)
Age: 74
End: 2024-11-21

## 2024-11-21 VITALS
OXYGEN SATURATION: 99 % | DIASTOLIC BLOOD PRESSURE: 68 MMHG | RESPIRATION RATE: 16 BRPM | SYSTOLIC BLOOD PRESSURE: 136 MMHG | HEART RATE: 68 BPM | TEMPERATURE: 98 F

## 2024-11-21 LAB
ALBUMIN SERPL ELPH-MCNC: 3.4 G/DL — SIGNIFICANT CHANGE UP (ref 3.3–5.2)
ALP SERPL-CCNC: 97 U/L — SIGNIFICANT CHANGE UP (ref 40–120)
ALT FLD-CCNC: 8 U/L — SIGNIFICANT CHANGE UP
ANION GAP SERPL CALC-SCNC: 13 MMOL/L — SIGNIFICANT CHANGE UP (ref 5–17)
AST SERPL-CCNC: 10 U/L — SIGNIFICANT CHANGE UP
BILIRUB SERPL-MCNC: 0.3 MG/DL — LOW (ref 0.4–2)
BUN SERPL-MCNC: 27.6 MG/DL — HIGH (ref 8–20)
CALCIUM SERPL-MCNC: 8.5 MG/DL — SIGNIFICANT CHANGE UP (ref 8.4–10.5)
CHLORIDE SERPL-SCNC: 101 MMOL/L — SIGNIFICANT CHANGE UP (ref 96–108)
CO2 SERPL-SCNC: 21 MMOL/L — LOW (ref 22–29)
CREAT SERPL-MCNC: 1.43 MG/DL — HIGH (ref 0.5–1.3)
EGFR: 39 ML/MIN/1.73M2 — LOW
GLUCOSE BLDC GLUCOMTR-MCNC: 189 MG/DL — HIGH (ref 70–99)
GLUCOSE BLDC GLUCOMTR-MCNC: 237 MG/DL — HIGH (ref 70–99)
GLUCOSE SERPL-MCNC: 229 MG/DL — HIGH (ref 70–99)
HCT VFR BLD CALC: 28.1 % — LOW (ref 34.5–45)
HGB BLD-MCNC: 9.1 G/DL — LOW (ref 11.5–15.5)
MAGNESIUM SERPL-MCNC: 2.4 MG/DL — SIGNIFICANT CHANGE UP (ref 1.6–2.6)
MCHC RBC-ENTMCNC: 26.1 PG — LOW (ref 27–34)
MCHC RBC-ENTMCNC: 32.4 G/DL — SIGNIFICANT CHANGE UP (ref 32–36)
MCV RBC AUTO: 80.7 FL — SIGNIFICANT CHANGE UP (ref 80–100)
PLATELET # BLD AUTO: 524 K/UL — HIGH (ref 150–400)
POTASSIUM SERPL-MCNC: 4.6 MMOL/L — SIGNIFICANT CHANGE UP (ref 3.5–5.3)
POTASSIUM SERPL-SCNC: 4.6 MMOL/L — SIGNIFICANT CHANGE UP (ref 3.5–5.3)
PROT SERPL-MCNC: 7 G/DL — SIGNIFICANT CHANGE UP (ref 6.6–8.7)
RBC # BLD: 3.48 M/UL — LOW (ref 3.8–5.2)
RBC # FLD: 18.4 % — HIGH (ref 10.3–14.5)
SODIUM SERPL-SCNC: 135 MMOL/L — SIGNIFICANT CHANGE UP (ref 135–145)
WBC # BLD: 10.2 K/UL — SIGNIFICANT CHANGE UP (ref 3.8–10.5)
WBC # FLD AUTO: 10.2 K/UL — SIGNIFICANT CHANGE UP (ref 3.8–10.5)

## 2024-11-21 PROCEDURE — 71250 CT THORAX DX C-: CPT | Mod: MC

## 2024-11-21 PROCEDURE — 71045 X-RAY EXAM CHEST 1 VIEW: CPT

## 2024-11-21 PROCEDURE — 80048 BASIC METABOLIC PNL TOTAL CA: CPT

## 2024-11-21 PROCEDURE — C1760: CPT

## 2024-11-21 PROCEDURE — C1894: CPT

## 2024-11-21 PROCEDURE — 99239 HOSP IP/OBS DSCHRG MGMT >30: CPT

## 2024-11-21 PROCEDURE — 83605 ASSAY OF LACTIC ACID: CPT

## 2024-11-21 PROCEDURE — T1013: CPT

## 2024-11-21 PROCEDURE — 99285 EMERGENCY DEPT VISIT HI MDM: CPT

## 2024-11-21 PROCEDURE — 85025 COMPLETE CBC W/AUTO DIFF WBC: CPT

## 2024-11-21 PROCEDURE — 83735 ASSAY OF MAGNESIUM: CPT

## 2024-11-21 PROCEDURE — C1769: CPT

## 2024-11-21 PROCEDURE — 97530 THERAPEUTIC ACTIVITIES: CPT

## 2024-11-21 PROCEDURE — 36415 COLL VENOUS BLD VENIPUNCTURE: CPT

## 2024-11-21 PROCEDURE — 96374 THER/PROPH/DIAG INJ IV PUSH: CPT

## 2024-11-21 PROCEDURE — C1887: CPT

## 2024-11-21 PROCEDURE — 97116 GAIT TRAINING THERAPY: CPT

## 2024-11-21 PROCEDURE — 96375 TX/PRO/DX INJ NEW DRUG ADDON: CPT

## 2024-11-21 PROCEDURE — 93005 ELECTROCARDIOGRAM TRACING: CPT

## 2024-11-21 PROCEDURE — 83880 ASSAY OF NATRIURETIC PEPTIDE: CPT

## 2024-11-21 PROCEDURE — 82962 GLUCOSE BLOOD TEST: CPT

## 2024-11-21 PROCEDURE — 80053 COMPREHEN METABOLIC PANEL: CPT

## 2024-11-21 PROCEDURE — 93458 L HRT ARTERY/VENTRICLE ANGIO: CPT

## 2024-11-21 PROCEDURE — 85027 COMPLETE CBC AUTOMATED: CPT

## 2024-11-21 PROCEDURE — 84484 ASSAY OF TROPONIN QUANT: CPT

## 2024-11-21 RX ORDER — FERROUS SULFATE 137(45) MG
1 TABLET, EXTENDED RELEASE ORAL
Refills: 0 | DISCHARGE

## 2024-11-21 RX ORDER — METOPROLOL TARTRATE 100 MG/1
1 TABLET, FILM COATED ORAL
Qty: 0 | Refills: 0 | DISCHARGE
Start: 2024-11-21

## 2024-11-21 RX ORDER — FUROSEMIDE 40 MG/1
1 TABLET ORAL
Qty: 30 | Refills: 0
Start: 2024-11-21 | End: 2024-12-20

## 2024-11-21 RX ORDER — GABAPENTIN 300 MG/1
1 CAPSULE ORAL
Qty: 0 | Refills: 0 | DISCHARGE
Start: 2024-11-21

## 2024-11-21 RX ORDER — AMLODIPINE BESYLATE 10 MG/1
1 TABLET ORAL
Qty: 0 | Refills: 0 | DISCHARGE
Start: 2024-11-21

## 2024-11-21 RX ORDER — ISOSORBIDE MONONITRATE 10 MG
1 TABLET ORAL
Qty: 0 | Refills: 0 | DISCHARGE
Start: 2024-11-21

## 2024-11-21 RX ORDER — FERROUS SULFATE 325(65) MG
1 TABLET ORAL
Qty: 0 | Refills: 0 | DISCHARGE
Start: 2024-11-21

## 2024-11-21 RX ORDER — CLOPIDOGREL 75 MG/1
1 TABLET, FILM COATED ORAL
Qty: 30 | Refills: 0
Start: 2024-11-21 | End: 2024-12-20

## 2024-11-21 RX ORDER — RANOLAZINE 1000 MG/1
1 TABLET, FILM COATED, EXTENDED RELEASE ORAL
Qty: 0 | Refills: 0 | DISCHARGE
Start: 2024-11-21

## 2024-11-21 RX ORDER — SACUBITRIL AND VALSARTAN 24; 26 MG/1; MG/1
1 TABLET, FILM COATED ORAL
Qty: 60 | Refills: 0
Start: 2024-11-21 | End: 2024-12-20

## 2024-11-21 RX ADMIN — AMLODIPINE BESYLATE 10 MILLIGRAM(S): 10 TABLET ORAL at 05:25

## 2024-11-21 RX ADMIN — Medication 325 MILLIGRAM(S): at 13:03

## 2024-11-21 RX ADMIN — Medication 325 MILLIGRAM(S): at 05:24

## 2024-11-21 RX ADMIN — Medication 1200 MILLIGRAM(S): at 05:34

## 2024-11-21 RX ADMIN — METOPROLOL TARTRATE 100 MILLIGRAM(S): 100 TABLET, FILM COATED ORAL at 05:25

## 2024-11-21 RX ADMIN — Medication 5000 UNIT(S): at 05:25

## 2024-11-21 RX ADMIN — Medication 81 MILLIGRAM(S): at 08:39

## 2024-11-21 RX ADMIN — RANOLAZINE 500 MILLIGRAM(S): 1000 TABLET, FILM COATED, EXTENDED RELEASE ORAL at 05:24

## 2024-11-21 RX ADMIN — SACUBITRIL AND VALSARTAN 1 TABLET(S): 24; 26 TABLET, FILM COATED ORAL at 05:24

## 2024-11-21 RX ADMIN — Medication 1: at 08:38

## 2024-11-21 RX ADMIN — FUROSEMIDE 40 MILLIGRAM(S): 40 TABLET ORAL at 05:24

## 2024-11-21 RX ADMIN — CLOPIDOGREL 75 MILLIGRAM(S): 75 TABLET, FILM COATED ORAL at 08:39

## 2024-11-21 RX ADMIN — Medication 60 MILLIGRAM(S): at 08:39

## 2024-11-21 RX ADMIN — Medication 2: at 13:03

## 2024-11-21 NOTE — DISCHARGE NOTE NURSING/CASE MANAGEMENT/SOCIAL WORK - NSDCPEFALRISK_GEN_ALL_CORE
For information on Fall & Injury Prevention, visit: [email or web address] OR  [email or web address] OR  https://www.cdc.gov/steadi/patient.html

## 2024-11-21 NOTE — PROGRESS NOTE ADULT - SUBJECTIVE AND OBJECTIVE BOX
Department of Cardiology                                                                  [facility]/[place] [address] [address]                                                            Telephone: [phone]. Fax:[phone]                                                                             Pre- Procedure Progress Note      HPI:  73 y/o female with hx of CAD s/p cardiac cath 2021 with MVD medically managed, HFrEF, Mobitz II s/p PPM (Nereyda, MDT), PAD with chronic right LE wound, CVA, DM2, CKD3, HTN who presents with SOB, leg edema and chest tightness x3 days. Pt states that she gets out of breath while walking. She gained 6 lbs over the last week and noticed that her legs are swelling. Blood work significant for hyperkalemia 6.3, Cr 1.55, proBNP 03506. CXR reveals pulmonary edema and increased small R pleural effusion. Pt was admitted last month with similar symptoms. Echocardiogram on 10/8/24 revealed EF 45-50% and new regional WMA. Pt was offered cardiac catheterization but initially declined due to renal concerns and now optimized for [facility] . She follows with Dr. Allen.        Symptoms:        Angina (Class):        Ischemic Symptoms:     Heart Failure:        Systolic/Diastolic/Combined: Systolic       NYHA Class (within 2 weeks): 2    Assessment of LVEF (Must be within 6 months):       EF: 47%       Assessed by: TTE       Date: 10/8/24    Prior Cardiac Interventions:       PCI's (Date, Stents, Vessels):        CABG (Date, Grafts):   [facility] 21  CORONARY VESSELS: The coronary circulation is right dominant.  LM:   --  LM: The vessel was large sized. Angiography showed mild  atherosclerosis with no flow limiting lesions.  LAD:   --  LAD: The vessel was large sized.  --  Distal LAD: Angiography showed severe atherosclerosis.  CX:   --  Circumflex: There was a tubular 50 % stenosis in the middle third  of the vessel segment.  --  OM1: The vesselwas large sized that bifurcates into 2 branches. There  was a 80 % stenosis in both branches  --  LPL1: There was a 70 % stenosis at the ostium of the vessel segment.  RCA:   --  RPDA: The vessel was medium sized. Angiography showed severe  atherosclerosis.  COMPLICATIONS: No complications occurred during the cath lab visit.  DIAGNOSTIC IMPRESSIONS: Multivessel disease ( distal LAD, OM1 and LPL Of CX  and RPDA)  DIAGNOSTIC RECOMMENDATIONS: Branch vessel and severe distal LAD and RPDA  disease not suitable for PCI , recommend medical therapy with antianginal  if persistent angina despite optimal medical therapy may consider PCI of  OM1 of CX.      Echo (Date, Findings):    10/8/24  CONCLUSIONS:      1. Left ventricular cavity is normal in size. Left ventricular systolic function is mildly decreased with an ejection fraction of 47 % by Taylor's method of disks with an ejection fraction visually estimated at 45 to 50 %. Regional wall motion abnormalities present.   2. Multiple segmental abnormalities exist. See findings.   3. Unable to assess left ventricular diastolic function due to insufficient data.   4. The rightventricle is not well visualized but probably normal right ventricular cavity size and normal right ventricular systolic function.   5. Left atrium is mildly dilated.   6. Mild tricuspid regurgitation.   7. Thickened mitral valve leaflets.   8. Thereis mild anterior calcification and mild posterior calcification of the mitral valve annulus.   9. Fibrocalcific aortic valve sclerosis without stenosis.  10. Estimated pulmonary artery systolic pressure is 45 mmHg.  11. No pericardial effusion seen.  12. Compared to the transthoracic echocardiogram performed on 2021, there are new regional wall motion abnormalities present and newly reduced LVEF.      Additional Diagnostics:    Risk Assessments:  ASA: 3  Mallampati: 2  Bleeding Risk: 5.5%  Creatinine: 1.16  GFR: 50    Associated Risk Factors:        Cerebrovascular Disease: N/A       Chronic Lung Disease: N/A       Peripheral Arterial Disease: N/A       Chronic Kidney Disease (if yes, what is GFR): 50       Uncontrolled Diabetes (if yes, what is HgbA1C or FBS): N/A       Poorly Controlled Hypertension (if yes, what is SBP): N/A       Morbid Obesity (if yes, what is BMI): N/A       History of Recent Ventricular Arrhythmia: N/A       Inability to Ambulate Safely: N/A       Need for Therapeutic Anticoagulation: N/A       Antiplatelet or Contrast Allergy: N/A       Fraility: Mild    Antianginal Therapies:        Beta Blockers:  Toprol  mg       Calcium Channel Blockers:        Long Acting Nitrates: Imdur 60mg       Ranexa: 500 mg    	  MEDICATIONS:  amLODIPine   Tablet 10 milliGRAM(s) Oral daily  furosemide   Injectable 40 milliGRAM(s) IV Push two times a day  isosorbide   mononitrate ER Tablet (IMDUR) 60 milliGRAM(s) Oral daily  metoprolol succinate  milliGRAM(s) Oral daily  ranolazine 500 milliGRAM(s) Oral two times a day  acetaminophen     Tablet .. 650 milliGRAM(s) Oral every 6 hours PRN  gabapentin 300 milliGRAM(s) Oral at bedtime  melatonin 3 milliGRAM(s) Oral at bedtime PRN  ondansetron Injectable 4 milliGRAM(s) IV Push every 8 hours PRN  oxyCODONE    IR 2.5 milliGRAM(s) Oral every 6 hours PRN  aluminum hydroxide/magnesium hydroxide/simethicone Suspension 30 milliLiter(s) Oral every 4 hours PRN  atorvastatin 80 milliGRAM(s) Oral at bedtime  dextrose 50% Injectable 25 Gram(s) IV Push once  dextrose 50% Injectable 12.5 Gram(s) IV Push once  dextrose 50% Injectable 25 Gram(s) IV Push once  dextrose Oral Gel 15 Gram(s) Oral once PRN  glucagon  Injectable 1 milliGRAM(s) IntraMuscular once  insulin glargine Injectable (LANTUS) 20 Unit(s) SubCutaneous at bedtime  insulin lispro (ADMELOG) corrective regimen sliding scale   SubCutaneous three times a day before meals  aspirin enteric coated 81 milliGRAM(s) Oral daily  clopidogrel Tablet 75 milliGRAM(s) Oral daily  dextrose 5%. 1000 milliLiter(s) IV Continuous <Continuous>  dextrose 5%. 1000 milliLiter(s) IV Continuous <Continuous>  ferrous    sulfate 325 milliGRAM(s) Oral three times a day  heparin   Injectable 5000 Unit(s) SubCutaneous every 12 hours  sodium chloride 0.9% Bolus 125 milliLiter(s) IV Bolus once        PHYSICAL EXAM:    Constitutional: A & O x 3  HEENT:   Normal oral mucosa, PERRL, EOMI	  Cardiovascular: Normal S1 S2, No JVD, No murmurs, No edema  Respiratory: Lungs clear to auscultation	  Gastrointestinal:  Soft, Non-tender, + BS	  Skin: No rashes, No ecchymoses, No cyanosis  Neurologic: Non-focal  Extremities: Normal range of motion, No clubbing, cyanosis or edema  Vascular: Peripheral pulses palpable + doppler      T(C): 36.7 (24 @ 05:12), Max: 36.7 (24 @ 16:57)  HR: 61 (24 @ 05:12) (61 - 80)  BP: 156/67 (24 @ 05:12) (153/81 - 169/71)  RR: 18 (24 @ 05:12) (18 - 20)  SpO2: 95% (24 @ 05:12) (95% - 99%)  Wt(kg): --      I&O's Summary    2024 07:01  -  2024 07:00  --------------------------------------------------------  IN: 470 mL / OUT: 700 mL / NET: -230 mL        Daily     Daily Weight in k.8 (2024 05:12)    TELEMETRY: SR/paced 	      ECG:  Paced	    LABS:	 	    CARDIAC MARKERS:                     9.3    10.66 )-----------( 474      ( 2024 04:30 )             29.7         134[L]  |  98  |  27.7[H]  ----------------------------<  128[H]  4.0   |  23.0  |  1.16    Ca    9.2      2024 04:30  Mg     2.1           proBNP: 16511  Lipid Profile:   HgA1c: 7.1  TSH:     Impression: 73 yo female with known CAD/MVD treated medically who has decreased EF with new WMA here for LHC.    Plan:  -plan for LHC via RFA  -patient seen and examined  -confirmed appropriate NPO duration  -SALO prophylaxis NS 125cc bolus ordered due to elevated BNP/CHF  -ECG and Labs reviewed  -Aspirin 81mg po pre-cath  -procedure discussed with patient; risks and benefits explained, questions answered  -consent obtained by attending IC    Pt. assessed, appropriate for sedation, pt. educated regarding the plan for Versed/Fentanyl as needed.      
NEPHROLOGY INTERVAL HPI/OVERNIGHT EVENTS:    Examined earlier  No distress  Denies HA CP no SOB    MEDICATIONS  (STANDING):  acetylcysteine  Oral Solution 1200 milliGRAM(s) Oral every 12 hours  amLODIPine   Tablet 10 milliGRAM(s) Oral daily  aspirin enteric coated 81 milliGRAM(s) Oral daily  atorvastatin 80 milliGRAM(s) Oral at bedtime  clopidogrel Tablet 75 milliGRAM(s) Oral daily  dextrose 5%. 1000 milliLiter(s) (50 mL/Hr) IV Continuous <Continuous>  dextrose 5%. 1000 milliLiter(s) (100 mL/Hr) IV Continuous <Continuous>  dextrose 50% Injectable 25 Gram(s) IV Push once  dextrose 50% Injectable 12.5 Gram(s) IV Push once  dextrose 50% Injectable 25 Gram(s) IV Push once  ferrous    sulfate 325 milliGRAM(s) Oral three times a day  furosemide    Tablet 40 milliGRAM(s) Oral daily  gabapentin 300 milliGRAM(s) Oral at bedtime  glucagon  Injectable 1 milliGRAM(s) IntraMuscular once  heparin   Injectable 5000 Unit(s) SubCutaneous every 12 hours  insulin glargine Injectable (LANTUS) 20 Unit(s) SubCutaneous at bedtime  insulin lispro (ADMELOG) corrective regimen sliding scale   SubCutaneous three times a day before meals  isosorbide   mononitrate ER Tablet (IMDUR) 60 milliGRAM(s) Oral daily  metoprolol succinate  milliGRAM(s) Oral daily  ranolazine 500 milliGRAM(s) Oral two times a day  sacubitril 24 mG/valsartan 26 mG 1 Tablet(s) Oral two times a day    MEDICATIONS  (PRN):  acetaminophen     Tablet .. 650 milliGRAM(s) Oral every 6 hours PRN Temp greater or equal to 38C (100.4F), Mild Pain (1 - 3)  aluminum hydroxide/magnesium hydroxide/simethicone Suspension 30 milliLiter(s) Oral every 4 hours PRN Dyspepsia  dextrose Oral Gel 15 Gram(s) Oral once PRN Blood Glucose LESS THAN 70 milliGRAM(s)/deciliter  melatonin 3 milliGRAM(s) Oral at bedtime PRN Insomnia  ondansetron Injectable 4 milliGRAM(s) IV Push every 8 hours PRN Nausea and/or Vomiting  oxyCODONE    IR 2.5 milliGRAM(s) Oral every 6 hours PRN Severe Pain (7 - 10)      Allergies    No Known Allergies    Intolerances        Vital Signs Last 24 Hrs  T(C): 36.7 (2024 08:16), Max: 36.9 (2024 21:41)  T(F): 98.1 (2024 08:16), Max: 98.4 (2024 21:41)  HR: 70 (2024 08:16) (66 - 70)  BP: 128/58 (2024 08:16) (128/58 - 148/69)  BP(mean): 81 (2024 08:16) (81 - 97)  RR: 16 (2024 08:16) (16 - 18)  SpO2: 100% (2024 08:16) (98% - 100%)    Parameters below as of 2024 08:16  Patient On (Oxygen Delivery Method): room air      Daily     Daily Weight in k.6 (2024 05:20)    PHYSICAL EXAM:  GENERAL:  NAD  HEAD:  Atraumatic, Normocephalic  EYES: EOMI  NECK: Supple  NERVOUS SYSTEM:  Alert & Oriented X3  CHEST/LUNG: Clear to percussion bilaterally  HEART: Regular rate and rhythm  ABDOMEN: Soft, Nontender, Nondistended; +BS  EXTREMITIES:  trace edema      LABS:                        9.1    10.20 )-----------( 524      ( 2024 04:50 )             28.1     11    135  |  101  |  27.6[H]  ----------------------------<  229[H]  4.6   |  21.0[L]  |  1.43[H]    Ca    8.5      2024 04:50  Mg     2.4     11-    TPro  7.0  /  Alb  3.4  /  TBili  0.3[L]  /  DBili  x   /  AST  10  /  ALT  8   /  AlkPhos  97  11-      Urinalysis Basic - ( 2024 04:50 )    Color: x / Appearance: x / SG: x / pH: x  Gluc: 229 mg/dL / Ketone: x  / Bili: x / Urobili: x   Blood: x / Protein: x / Nitrite: x   Leuk Esterase: x / RBC: x / WBC x   Sq Epi: x / Non Sq Epi: x / Bacteria: x      Magnesium: 2.4 mg/dL ( @ 04:50)          RADIOLOGY & ADDITIONAL TESTS:  
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 Will tentatively plan or LHC and if unable to reach son will cancel in am   Patient denies chest pain, SOB, abd pain, N/V, fever, chills, dysuria or any other complaints. All remainder ROS negative.       MEDICATIONS  (STANDING):  amLODIPine   Tablet 10 milliGRAM(s) Oral daily  aspirin enteric coated 81 milliGRAM(s) Oral daily  atorvastatin 80 milliGRAM(s) Oral at bedtime  clopidogrel Tablet 75 milliGRAM(s) Oral daily  dextrose 5%. 1000 milliLiter(s) (50 mL/Hr) IV Continuous <Continuous>  dextrose 5%. 1000 milliLiter(s) (100 mL/Hr) IV Continuous <Continuous>  dextrose 50% Injectable 25 Gram(s) IV Push once  dextrose 50% Injectable 12.5 Gram(s) IV Push once  dextrose 50% Injectable 25 Gram(s) IV Push once  ferrous    sulfate 325 milliGRAM(s) Oral three times a day  furosemide   Injectable 40 milliGRAM(s) IV Push two times a day  gabapentin 300 milliGRAM(s) Oral at bedtime  glucagon  Injectable 1 milliGRAM(s) IntraMuscular once  heparin   Injectable 5000 Unit(s) SubCutaneous every 12 hours  insulin glargine Injectable (LANTUS) 20 Unit(s) SubCutaneous at bedtime  insulin lispro (ADMELOG) corrective regimen sliding scale   SubCutaneous three times a day before meals  isosorbide   mononitrate ER Tablet (IMDUR) 60 milliGRAM(s) Oral daily  metoprolol succinate  milliGRAM(s) Oral daily  ranolazine 500 milliGRAM(s) Oral two times a day    MEDICATIONS  (PRN):  acetaminophen     Tablet .. 650 milliGRAM(s) Oral every 6 hours PRN Temp greater or equal to 38C (100.4F), Mild Pain (1 - 3)  aluminum hydroxide/magnesium hydroxide/simethicone Suspension 30 milliLiter(s) Oral every 4 hours PRN Dyspepsia  dextrose Oral Gel 15 Gram(s) Oral once PRN Blood Glucose LESS THAN 70 milliGRAM(s)/deciliter  melatonin 3 milliGRAM(s) Oral at bedtime PRN Insomnia  ondansetron Injectable 4 milliGRAM(s) IV Push every 8 hours PRN Nausea and/or Vomiting  oxyCODONE    IR 2.5 milliGRAM(s) Oral every 6 hours PRN Severe Pain (7 - 10)        I&O's Summary    17 Nov 2024 07:01  -  18 Nov 2024 07:00  --------------------------------------------------------  IN: 220 mL / OUT: 2600 mL / NET: -2380 mL    18 Nov 2024 07:01  -  18 Nov 2024 18:33  --------------------------------------------------------  IN: 350 mL / OUT: 0 mL / NET: 350 mL        PHYSICAL EXAM:  Vital Signs Last 24 Hrs  T(C): 36.7 (18 Nov 2024 16:57), Max: 36.7 (17 Nov 2024 21:26)  T(F): 98 (18 Nov 2024 16:57), Max: 98 (17 Nov 2024 21:26)  HR: 76 (18 Nov 2024 16:57) (56 - 80)  BP: 169/71 (18 Nov 2024 16:57) (144/71 - 169/71)  BP(mean): 104 (18 Nov 2024 16:57) (104 - 104)  RR: 20 (18 Nov 2024 16:57) (18 - 20)  SpO2: 99% (18 Nov 2024 16:57) (91% - 99%)    Parameters below as of 18 Nov 2024 16:57  Patient On (Oxygen Delivery Method): room air      CONSTITUTIONAL: Non toxic appearing, sitting up in bed   ENMT: Moist oral mucosa, no pharyngeal injection or exudates; normal dentition  RESPIRATORY: Normal respiratory effort; lungs are clear to auscultation bilaterally  CARDIOVASCULAR: Regular rate and rhythm, normal S1 and S2, no murmur/rub/gallop; No lower extremity edema; Peripheral pulses are 2+ bilaterally  ABDOMEN: Nontender to palpation, normoactive bowel sounds, no rebound/guarding; No hepatosplenomegaly  MUSCLOSKELETAL:   no clubbing or cyanosis of digits; no joint swelling or tenderness to palpation  PSYCH: A+O to person, place, and time; affect appropriate  NEUROLOGY: CN 2-12 are intact and symmetric; no gross sensory deficits;   SKIN: No rashes; no palpable lesions    LABS:                        9.2    10.02 )-----------( 564      ( 18 Nov 2024 05:03 )             28.5     11-18    141  |  103  |  33.4[H]  ----------------------------<  70  4.5   |  22.0  |  1.30    Ca    9.2      18 Nov 2024 05:03  Mg     2.2     11-18    TPro  7.5  /  Alb  3.6  /  TBili  0.4  /  DBili  x   /  AST  SEE NOTE  /  ALT  SEE NOTE  /  AlkPhos  99  11-16          Urinalysis Basic - ( 18 Nov 2024 05:03 )    Color: x / Appearance: x / SG: x / pH: x  Gluc: 70 mg/dL / Ketone: x  / Bili: x / Urobili: x   Blood: x / Protein: x / Nitrite: x   Leuk Esterase: x / RBC: x / WBC x   Sq Epi: x / Non Sq Epi: x / Bacteria: x        CAPILLARY BLOOD GLUCOSE      POCT Blood Glucose.: 267 mg/dL (18 Nov 2024 17:12)  POCT Blood Glucose.: 182 mg/dL (18 Nov 2024 11:49)  POCT Blood Glucose.: 73 mg/dL (18 Nov 2024 08:12)  POCT Blood Glucose.: 163 mg/dL (17 Nov 2024 22:02)        MICRO:        RADIOLOGY & ADDITIONAL TESTS:                                
[facility] Division of Hospital Medicine    Chief Complaint:  HF    SUBJECTIVE / OVERNIGHT EVENTS:  Pt examined sitting in bed  s/p LHC revelaing multivessel CAD   Patient denies chest pain, SOB, abd pain, N/V, fever, chills, dysuria or any other complaints. All remainder ROS negative.       MEDICATIONS  (STANDING):  amLODIPine   Tablet 10 milliGRAM(s) Oral daily  aspirin enteric coated 81 milliGRAM(s) Oral daily  atorvastatin 80 milliGRAM(s) Oral at bedtime  clopidogrel Tablet 75 milliGRAM(s) Oral daily  dextrose 5%. 1000 milliLiter(s) (50 mL/Hr) IV Continuous <Continuous>  dextrose 5%. 1000 milliLiter(s) (100 mL/Hr) IV Continuous <Continuous>  dextrose 50% Injectable 25 Gram(s) IV Push once  dextrose 50% Injectable 12.5 Gram(s) IV Push once  dextrose 50% Injectable 25 Gram(s) IV Push once  ferrous    sulfate 325 milliGRAM(s) Oral three times a day  furosemide   Injectable 40 milliGRAM(s) IV Push two times a day  gabapentin 300 milliGRAM(s) Oral at bedtime  glucagon  Injectable 1 milliGRAM(s) IntraMuscular once  heparin   Injectable 5000 Unit(s) SubCutaneous every 12 hours  insulin glargine Injectable (LANTUS) 20 Unit(s) SubCutaneous at bedtime  insulin lispro (ADMELOG) corrective regimen sliding scale   SubCutaneous three times a day before meals  isosorbide   mononitrate ER Tablet (IMDUR) 60 milliGRAM(s) Oral daily  metoprolol succinate  milliGRAM(s) Oral daily  ranolazine 500 milliGRAM(s) Oral two times a day    MEDICATIONS  (PRN):  acetaminophen     Tablet .. 650 milliGRAM(s) Oral every 6 hours PRN Temp greater or equal to 38C (100.4F), Mild Pain (1 - 3)  aluminum hydroxide/magnesium hydroxide/simethicone Suspension 30 milliLiter(s) Oral every 4 hours PRN Dyspepsia  dextrose Oral Gel 15 Gram(s) Oral once PRN Blood Glucose LESS THAN 70 milliGRAM(s)/deciliter  melatonin 3 milliGRAM(s) Oral at bedtime PRN Insomnia  ondansetron Injectable 4 milliGRAM(s) IV Push every 8 hours PRN Nausea and/or Vomiting  oxyCODONE    IR 2.5 milliGRAM(s) Oral every 6 hours PRN Severe Pain (7 - 10)      PHYSICAL EXAM:  Vital Signs Last 24 Hrs  T(C): 36.6 (19 Nov 2024 12:39), Max: 36.9 (19 Nov 2024 07:53)  T(F): 97.8 (19 Nov 2024 12:39), Max: 98.5 (19 Nov 2024 07:53)  HR: 77 (19 Nov 2024 12:39) (61 - 78)  BP: 170/72 (19 Nov 2024 12:39) (143/61 - 170/72)  BP(mean): 106 (18 Nov 2024 22:20) (104 - 106)  RR: 18 (19 Nov 2024 12:39) (14 - 20)  SpO2: 100% (19 Nov 2024 12:39) (95% - 100%)    Parameters below as of 19 Nov 2024 12:39  Patient On (Oxygen Delivery Method): room air        CONSTITUTIONAL: Non toxic appearing, sitting up in bed   ENMT: Moist oral mucosa, no pharyngeal injection or exudates; normal dentition  RESPIRATORY: Normal respiratory effort; lungs are clear to auscultation bilaterally  CARDIOVASCULAR: Regular rate and rhythm, normal S1 and S2, no murmur/rub/gallop; No lower extremity edema; Peripheral pulses are 2+ bilaterally  ABDOMEN: Nontender to palpation, normoactive bowel sounds, no rebound/guarding; No hepatosplenomegaly  MUSCLOSKELETAL:   no clubbing or cyanosis of digits; no joint swelling or tenderness to palpation  PSYCH: A+O to person, place, and time; affect appropriate  NEUROLOGY: CN 2-12 are intact and symmetric; no gross sensory deficits;   SKIN: No rashes; no palpable lesions    LABS:                          9.3    10.66 )-----------( 474      ( 19 Nov 2024 04:30 )             29.7   11-19    134[L]  |  98  |  27.7[H]  ----------------------------<  128[H]  4.0   |  23.0  |  1.16    Ca    9.2      19 Nov 2024 04:30  Mg     2.1     11-19      Urinalysis Basic - ( 18 Nov 2024 05:03 )    Color: x / Appearance: x / SG: x / pH: x  Gluc: 70 mg/dL / Ketone: x  / Bili: x / Urobili: x   Blood: x / Protein: x / Nitrite: x   Leuk Esterase: x / RBC: x / WBC x   Sq Epi: x / Non Sq Epi: x / Bacteria: x        CAPILLARY BLOOD GLUCOSE      POCT Blood Glucose.: 98 mg/dL (19 Nov 2024 12:35)  POCT Blood Glucose.: 239 mg/dL (18 Nov 2024 22:25)  POCT Blood Glucose.: 267 mg/dL (18 Nov 2024 17:12)      TTE reviewed                               
Hospitalist Daily Progress Note    Chief Complaint:  Patient is a 74y old  Female who presents with a chief complaint of Decompensated HF (16 Nov 2024 23:16)      SUBJECTIVE / OVERNIGHT EVENTS:  Patient was seen and examined at bedside. [place]i translated by Jarrod at bedside. Reports mild improvement.   Patient denies chest pain, SOB, abd pain, N/V, fever, chills, dysuria or any other complaints. All remainder ROS negative.     MEDICATIONS  (STANDING):  amLODIPine   Tablet 10 milliGRAM(s) Oral daily  aspirin enteric coated 81 milliGRAM(s) Oral daily  atorvastatin 80 milliGRAM(s) Oral at bedtime  clopidogrel Tablet 75 milliGRAM(s) Oral daily  dextrose 5%. 1000 milliLiter(s) (50 mL/Hr) IV Continuous <Continuous>  dextrose 5%. 1000 milliLiter(s) (100 mL/Hr) IV Continuous <Continuous>  dextrose 50% Injectable 25 Gram(s) IV Push once  dextrose 50% Injectable 12.5 Gram(s) IV Push once  dextrose 50% Injectable 25 Gram(s) IV Push once  ferrous    sulfate 325 milliGRAM(s) Oral three times a day  furosemide   Injectable 40 milliGRAM(s) IV Push two times a day  gabapentin 300 milliGRAM(s) Oral at bedtime  glucagon  Injectable 1 milliGRAM(s) IntraMuscular once  heparin   Injectable 5000 Unit(s) SubCutaneous every 12 hours  insulin glargine Injectable (LANTUS) 30 Unit(s) SubCutaneous at bedtime  insulin lispro (ADMELOG) corrective regimen sliding scale   SubCutaneous three times a day before meals  isosorbide   mononitrate ER Tablet (IMDUR) 60 milliGRAM(s) Oral daily  metoprolol succinate  milliGRAM(s) Oral daily  ranolazine 500 milliGRAM(s) Oral two times a day    MEDICATIONS  (PRN):  acetaminophen     Tablet .. 650 milliGRAM(s) Oral every 6 hours PRN Temp greater or equal to 38C (100.4F), Mild Pain (1 - 3)  aluminum hydroxide/magnesium hydroxide/simethicone Suspension 30 milliLiter(s) Oral every 4 hours PRN Dyspepsia  dextrose Oral Gel 15 Gram(s) Oral once PRN Blood Glucose LESS THAN 70 milliGRAM(s)/deciliter  melatonin 3 milliGRAM(s) Oral at bedtime PRN Insomnia  ondansetron Injectable 4 milliGRAM(s) IV Push every 8 hours PRN Nausea and/or Vomiting        I&O's Summary    17 Nov 2024 07:01  -  17 Nov 2024 13:31  --------------------------------------------------------  IN: 0 mL / OUT: 1800 mL / NET: -1800 mL        PHYSICAL EXAM:  Vital Signs Last 24 Hrs  T(C): 36.8 (17 Nov 2024 11:35), Max: 36.8 (17 Nov 2024 11:35)  T(F): 98.2 (17 Nov 2024 11:35), Max: 98.2 (17 Nov 2024 11:35)  HR: 67 (17 Nov 2024 11:35) (62 - 72)  BP: 136/67 (17 Nov 2024 11:35) (135/67 - 164/69)  BP(mean): 89 (17 Nov 2024 01:35) (89 - 101)  RR: 19 (17 Nov 2024 11:35) (19 - 28)  SpO2: 100% (17 Nov 2024 11:35) (93% - 100%)    Parameters below as of 17 Nov 2024 11:35  Patient On (Oxygen Delivery Method): nasal cannula          Constitutional: NAD, Resting. NC  ENT: Supple, No JVD  Lungs: CTA B/L, Non-labored breathing  Cardio: RRR, S1/S2, No murmur  Abdomen: Soft, Nontender, Nondistended; Bowel sounds present  Extremities: No calf tenderness, Trace pedal edema  Musculoskeletal:   No joint swelling  Psych: Calm, cooperative affect appropriate  Neuro: Awake and alert, oriented x 4  Skin: No rashes; no palpable lesions    LABS:                        9.5    11.52 )-----------( 425      ( 17 Nov 2024 07:03 )             29.2     11-17    128[L]  |  93[L]  |  39.2[H]  ----------------------------<  155[H]  4.9   |  18.0[L]  |  1.43[H]    Ca    9.3      17 Nov 2024 02:25  Mg     2.0     11-17    TPro  7.5  /  Alb  3.6  /  TBili  0.4  /  DBili  x   /  AST  SEE NOTE  /  ALT  SEE NOTE  /  AlkPhos  99  11-16          Urinalysis Basic - ( 17 Nov 2024 02:25 )    Color: x / Appearance: x / SG: x / pH: x  Gluc: 155 mg/dL / Ketone: x  / Bili: x / Urobili: x   Blood: x / Protein: x / Nitrite: x   Leuk Esterase: x / RBC: x / WBC x   Sq Epi: x / Non Sq Epi: x / Bacteria: x        CAPILLARY BLOOD GLUCOSE      POCT Blood Glucose.: 110 mg/dL (17 Nov 2024 11:46)  POCT Blood Glucose.: 97 mg/dL (17 Nov 2024 08:22)  POCT Blood Glucose.: 192 mg/dL (17 Nov 2024 01:23)  POCT Blood Glucose.: 153 mg/dL (16 Nov 2024 21:00)        RADIOLOGY REVIEWED  
Patient is a 74y old  Female who presents with a chief complaint of Decompensated HF (20 Nov 2024 10:12)    INTERVAL HPI/OVERNIGHT EVENTS: No acute events overnight. HD stable.     MEDICATIONS  (STANDING):  amLODIPine   Tablet 10 milliGRAM(s) Oral daily  aspirin enteric coated 81 milliGRAM(s) Oral daily  atorvastatin 80 milliGRAM(s) Oral at bedtime  clopidogrel Tablet 75 milliGRAM(s) Oral daily  dextrose 5%. 1000 milliLiter(s) (50 mL/Hr) IV Continuous <Continuous>  dextrose 5%. 1000 milliLiter(s) (100 mL/Hr) IV Continuous <Continuous>  dextrose 50% Injectable 25 Gram(s) IV Push once  dextrose 50% Injectable 12.5 Gram(s) IV Push once  dextrose 50% Injectable 25 Gram(s) IV Push once  ferrous    sulfate 325 milliGRAM(s) Oral three times a day  furosemide   Injectable 40 milliGRAM(s) IV Push two times a day  gabapentin 300 milliGRAM(s) Oral at bedtime  glucagon  Injectable 1 milliGRAM(s) IntraMuscular once  heparin   Injectable 5000 Unit(s) SubCutaneous every 12 hours  insulin glargine Injectable (LANTUS) 20 Unit(s) SubCutaneous at bedtime  insulin lispro (ADMELOG) corrective regimen sliding scale   SubCutaneous three times a day before meals  isosorbide   mononitrate ER Tablet (IMDUR) 60 milliGRAM(s) Oral daily  metoprolol succinate  milliGRAM(s) Oral daily  ranolazine 500 milliGRAM(s) Oral two times a day    MEDICATIONS  (PRN):  acetaminophen     Tablet .. 650 milliGRAM(s) Oral every 6 hours PRN Temp greater or equal to 38C (100.4F), Mild Pain (1 - 3)  aluminum hydroxide/magnesium hydroxide/simethicone Suspension 30 milliLiter(s) Oral every 4 hours PRN Dyspepsia  dextrose Oral Gel 15 Gram(s) Oral once PRN Blood Glucose LESS THAN 70 milliGRAM(s)/deciliter  melatonin 3 milliGRAM(s) Oral at bedtime PRN Insomnia  ondansetron Injectable 4 milliGRAM(s) IV Push every 8 hours PRN Nausea and/or Vomiting  oxyCODONE    IR 2.5 milliGRAM(s) Oral every 6 hours PRN Severe Pain (7 - 10)      Allergies    No Known Allergies    Intolerances        REVIEW OF SYSTEMS: all negative with exception of above    Vital Signs Last 24 Hrs  T(C): 36.5 (20 Nov 2024 08:29), Max: 36.8 (19 Nov 2024 16:41)  T(F): 97.7 (20 Nov 2024 08:29), Max: 98.3 (19 Nov 2024 16:41)  HR: 71 (20 Nov 2024 13:19) (66 - 78)  BP: 154/72 (20 Nov 2024 13:19) (118/60 - 158/71)  BP(mean): 99 (20 Nov 2024 13:19) (99 - 100)  RR: 20 (20 Nov 2024 13:19) (18 - 20)  SpO2: 100% (20 Nov 2024 13:19) (97% - 100%)    Parameters below as of 20 Nov 2024 13:19  Patient On (Oxygen Delivery Method): room air    PHYSICAL EXAM:  GENERAL: NAD, well-groomed  NERVOUS SYSTEM:  Alert & Oriented X3, Good concentration; Motor Strength 5/5 B/L upper and lower extremities; DTRs 2+ intact and symmetric  CHEST/LUNG: Clear to percussion bilaterally; No rales, rhonchi, wheezing, or rubs  HEART: Regular rate and rhythm; No murmurs, rubs, or gallops  ABDOMEN: Soft, Nontender, Nondistended; Bowel sounds present  EXTREMITIES:  2+ Peripheral Pulses, No clubbing, cyanosis, or edema    LABS:                        9.1    10.01 )-----------( 520      ( 20 Nov 2024 05:06 )             28.1     11-20    137  |  99  |  28.5[H]  ----------------------------<  197[H]  4.1   |  23.0  |  1.47[H]    Ca    8.5      20 Nov 2024 05:06  Mg     2.3     11-20        Urinalysis Basic - ( 20 Nov 2024 05:06 )    Color: x / Appearance: x / SG: x / pH: x  Gluc: 197 mg/dL / Ketone: x  / Bili: x / Urobili: x   Blood: x / Protein: x / Nitrite: x   Leuk Esterase: x / RBC: x / WBC x   Sq Epi: x / Non Sq Epi: x / Bacteria: x      CAPILLARY BLOOD GLUCOSE      POCT Blood Glucose.: 244 mg/dL (20 Nov 2024 13:03)  POCT Blood Glucose.: 157 mg/dL (20 Nov 2024 09:36)  POCT Blood Glucose.: 226 mg/dL (19 Nov 2024 20:57)  POCT Blood Glucose.: 303 mg/dL (19 Nov 2024 16:34)      RADIOLOGY & ADDITIONAL TESTS:    Imaging Personally Reviewed:  [ ] YES  [ ] NO    Consultant(s) Notes Reviewed:  [ ] YES  [ ] NO    Care Discussed with Consultants/Other Providers [ ] YES  [ ] NO
                                                         [facility]                                                         CARDIOLOGY AT [place] [address]                                                         Telephone: [phone]. Fax:[phone]                                                                             PROGRESS NOTE    Reason for follow up: CHF   Update: Pt seen and examined at the bedside. Language Line solutions, Romansh, used.  name: Jocelyn ID# [id number]. s/p LHC via RRA which showed severe multivessel CAD of the OM (unchanged from 2021), distal LAD (diffuse, severe, progressed since 2021) and new severe mid-distal RCA disease collateralized from left. Pt has no complaints at this time.     Review of symptoms:   Cardiac:  No chest pain. No dyspnea. No palpitations.  Respiratory: no cough. No dyspnea  Gastrointestinal: No diarrhea. No abdominal pain. No bleeding.   Neuro: No focal neuro complaints.    Vitals:  T(C): 36.5 (11-20-24 @ 08:29), Max: 36.8 (11-19-24 @ 16:41)  HR: 70 (11-20-24 @ 08:29) (59 - 78)  BP: 118/60 (11-20-24 @ 08:29) (118/60 - 170/72)  RR: 18 (11-20-24 @ 08:29) (14 - 18)  SpO2: 100% (11-20-24 @ 08:29) (97% - 100%)  Wt(kg): --  I&O's Summary    19 Nov 2024 07:01  -  20 Nov 2024 07:00  --------------------------------------------------------  IN: 420 mL / OUT: 500 mL / NET: -80 mL    20 Nov 2024 07:01  -  20 Nov 2024 10:12  --------------------------------------------------------  IN: 330 mL / OUT: 300 mL / NET: 30 mL      Weight (kg): 70.3 (11-16 @ 16:27)    PHYSICAL EXAM:  Appearance: Comfortable. No acute distress  HEENT:  Atraumatic. Normocephalic.  Normal oral mucosa  Neurologic: A & O x 3, no gross focal deficits.  Cardiovascular: RRR S1 S2, No murmur, no rubs/gallops. No JVD  Respiratory: Lungs clear to auscultation, unlabored   Gastrointestinal:  Soft, Non-tender, + BS  Lower Extremities: 2+ Peripheral Pulses, No clubbing, cyanosis, or edema  Psychiatry: Patient is calm. No agitation.   Skin: warm and dry.  CATH SITE: R wrist benign s/p cath.  No bleeding, no ecchymosis, no hematoma. Extremity Warm to touch, with palpable distal pulses, and brisk capillary refill.     CURRENT CARDIAC MEDICATIONS:  amLODIPine   Tablet 10 milliGRAM(s) Oral daily  furosemide   Injectable 40 milliGRAM(s) IV Push two times a day  isosorbide   mononitrate ER Tablet (IMDUR) 60 milliGRAM(s) Oral daily  metoprolol succinate  milliGRAM(s) Oral daily  ranolazine 500 milliGRAM(s) Oral two times a day    CURRENT OTHER MEDICATIONS:  acetaminophen     Tablet .. 650 milliGRAM(s) Oral every 6 hours PRN Temp greater or equal to 38C (100.4F), Mild Pain (1 - 3)  gabapentin 300 milliGRAM(s) Oral at bedtime  melatonin 3 milliGRAM(s) Oral at bedtime PRN Insomnia  ondansetron Injectable 4 milliGRAM(s) IV Push every 8 hours PRN Nausea and/or Vomiting  oxyCODONE    IR 2.5 milliGRAM(s) Oral every 6 hours PRN Severe Pain (7 - 10)  aluminum hydroxide/magnesium hydroxide/simethicone Suspension 30 milliLiter(s) Oral every 4 hours PRN Dyspepsia  atorvastatin 80 milliGRAM(s) Oral at bedtime  dextrose 50% Injectable 25 Gram(s) IV Push once, Stop order after: 1 Doses  dextrose 50% Injectable 12.5 Gram(s) IV Push once, Stop order after: 1 Doses  dextrose 50% Injectable 25 Gram(s) IV Push once, Stop order after: 1 Doses  dextrose Oral Gel 15 Gram(s) Oral once, Stop order after: 1 Doses PRN Blood Glucose LESS THAN 70 milliGRAM(s)/deciliter  glucagon  Injectable 1 milliGRAM(s) IntraMuscular once, Stop order after: 1 Doses  insulin glargine Injectable (LANTUS) 20 Unit(s) SubCutaneous at bedtime  insulin lispro (ADMELOG) corrective regimen sliding scale   SubCutaneous three times a day before meals  aspirin enteric coated 81 milliGRAM(s) Oral daily  clopidogrel Tablet 75 milliGRAM(s) Oral daily  dextrose 5%. 1000 milliLiter(s) (50 mL/Hr) IV Continuous <Continuous>  dextrose 5%. 1000 milliLiter(s) (100 mL/Hr) IV Continuous <Continuous>  ferrous    sulfate 325 milliGRAM(s) Oral three times a day  heparin   Injectable 5000 Unit(s) SubCutaneous every 12 hours    LABS:	 	                        9.1    10.01 )-----------( 520      ( 20 Nov 2024 05:06 )             28.1     11-20    137  |  99  |  28.5[H]  ----------------------------<  197[H]  4.1   |  23.0  |  1.47[H]    Ca    8.5      20 Nov 2024 05:06  Mg     2.3     11-20    Lipid Profile:   HgA1c:   TSH:     TELEMETRY: V-paced   ECG: Vpaced     DIAGNOSTIC TESTING:  [ x] Echocardiogram: < from: TTE W or WO Ultrasound Enhancing Agent (10.08.24 @ 15:15) >   1. Left ventricular cavity is normal in size. Left ventricular systolic function is mildly decreased with an ejection fraction of 47 % by Taylor's method of disks with an ejection fraction visually estimated at 45 to 50 %. Regional wall motion abnormalities present.   2. Multiple segmental abnormalities exist. See findings.   3. Unable to assess left ventricular diastolic function due to insufficient data.   4. The rightventricle is not well visualized but probably normal right ventricular cavity size and normal right ventricular systolic function.   5. Left atrium is mildly dilated.   6. Mild tricuspid regurgitation.   7. Thickened mitral valve leaflets.   8. Thereis mild anterior calcification and mild posterior calcification of the mitral valve annulus.   9. Fibrocalcific aortic valve sclerosis without stenosis.  10. Estimated pulmonary artery systolic pressure is 45 mmHg.  11. No pericardial effusion seen.  12. Compared to the transthoracic echocardiogram performed on 11/25/2021, there are new regional wall motion abnormalities present and newly reduced LVEF.    < end of copied text >    [x ]  Catheterization: 11/19: severe multivessel CAD of the OM (unchanged from 2021), distal LAD (diffuse, severe, progressed since 2021) and new severe mid-distal RCA disease collateralized from left   [ ] Stress Test:    OTHER: 	       
                                                         [facility]                                                         CARDIOLOGY AT [address], [place] [address]                                                         Telephone: [phone]. Fax:[phone]                                                                             PROGRESS NOTE    Reason for follow up: CHF   Update: Pt seen and examined at the bedside. Language line solutions used, Nigerian.  name: Angel ID:  [id number]. Pt has no complaints at this time. NPO since midnight, awaiting for [facility].     Review of symptoms:   Cardiac:  No chest pain. No dyspnea. No palpitations.  Respiratory: no cough. No dyspnea  Gastrointestinal: No diarrhea. No abdominal pain. No bleeding.   Neuro: No focal neuro complaints.    Vitals:  T(C): 36.9 (11-19-24 @ 07:53), Max: 36.9 (11-19-24 @ 07:53)  HR: 69 (11-19-24 @ 07:53) (61 - 80)  BP: 143/62 (11-19-24 @ 07:53) (143/62 - 169/71)  RR: 20 (11-19-24 @ 07:53) (18 - 20)  SpO2: 100% (11-19-24 @ 07:53) (95% - 100%)  Wt(kg): --  I&O's Summary    18 Nov 2024 07:01  -  19 Nov 2024 07:00  --------------------------------------------------------  IN: 470 mL / OUT: 700 mL / NET: -230 mL    Weight (kg): 70.3 (11-16 @ 16:27)    PHYSICAL EXAM:  Appearance: Comfortable. No acute distress  HEENT:  Atraumatic. Normocephalic.  Normal oral mucosa  Neurologic: A & O x 3, no gross focal deficits.  Cardiovascular: RRR S1 S2, No murmur, no rubs/gallops. No JVD  Respiratory: Lungs clear to auscultation, unlabored   Gastrointestinal:  Soft, Non-tender, + BS  Lower Extremities: 2+ Peripheral Pulses, No clubbing, cyanosis, or edema  Psychiatry: Patient is calm. No agitation.   Skin: warm and dry.    CURRENT CARDIAC MEDICATIONS:  amLODIPine   Tablet 10 milliGRAM(s) Oral daily  furosemide   Injectable 40 milliGRAM(s) IV Push two times a day  isosorbide   mononitrate ER Tablet (IMDUR) 60 milliGRAM(s) Oral daily  metoprolol succinate  milliGRAM(s) Oral daily  ranolazine 500 milliGRAM(s) Oral two times a day    CURRENT OTHER MEDICATIONS:  acetaminophen     Tablet .. 650 milliGRAM(s) Oral every 6 hours PRN Temp greater or equal to 38C (100.4F), Mild Pain (1 - 3)  gabapentin 300 milliGRAM(s) Oral at bedtime  melatonin 3 milliGRAM(s) Oral at bedtime PRN Insomnia  ondansetron Injectable 4 milliGRAM(s) IV Push every 8 hours PRN Nausea and/or Vomiting  oxyCODONE    IR 2.5 milliGRAM(s) Oral every 6 hours PRN Severe Pain (7 - 10)  aluminum hydroxide/magnesium hydroxide/simethicone Suspension 30 milliLiter(s) Oral every 4 hours PRN Dyspepsia  atorvastatin 80 milliGRAM(s) Oral at bedtime  dextrose 50% Injectable 25 Gram(s) IV Push once, Stop order after: 1 Doses  dextrose 50% Injectable 12.5 Gram(s) IV Push once, Stop order after: 1 Doses  dextrose 50% Injectable 25 Gram(s) IV Push once, Stop order after: 1 Doses  dextrose Oral Gel 15 Gram(s) Oral once, Stop order after: 1 Doses PRN Blood Glucose LESS THAN 70 milliGRAM(s)/deciliter  glucagon  Injectable 1 milliGRAM(s) IntraMuscular once, Stop order after: 1 Doses  insulin glargine Injectable (LANTUS) 20 Unit(s) SubCutaneous at bedtime  insulin lispro (ADMELOG) corrective regimen sliding scale   SubCutaneous three times a day before meals  aspirin enteric coated 81 milliGRAM(s) Oral daily  clopidogrel Tablet 75 milliGRAM(s) Oral daily  dextrose 5%. 1000 milliLiter(s) (50 mL/Hr) IV Continuous <Continuous>  dextrose 5%. 1000 milliLiter(s) (100 mL/Hr) IV Continuous <Continuous>  ferrous    sulfate 325 milliGRAM(s) Oral three times a day  heparin   Injectable 5000 Unit(s) SubCutaneous every 12 hours  sodium chloride 0.9% Bolus 125 milliLiter(s) IV Bolus once, Stop order after: 1 Doses    LABS:	 	             9.3    10.66 )-----------( 474      ( 19 Nov 2024 04:30 )             29.7     11-19    134[L]  |  98  |  27.7[H]  ----------------------------<  128[H]  4.0   |  23.0  |  1.16    Ca    9.2      19 Nov 2024 04:30  Mg     2.1     11-19        Lipid Profile:   HgA1c: 7.1%   TSH:     TELEMETRY: Vpaced   ECG: vpaced     DIAGNOSTIC TESTING:  [ x] Echocardiogram:   < from: TTE W or WO Ultrasound Enhancing Agent (10.08.24 @ 15:15) >      1. Left ventricular cavity is normal in size. Left ventricular systolic function is mildly decreased with an ejection fraction of 47 % by Taylor's method of disks with an ejection fraction visually estimated at 45 to 50 %. Regional wall motion abnormalities present.   2. Multiple segmental abnormalities exist. See findings.   3. Unable to assess left ventricular diastolic function due to insufficient data.   4. The rightventricle is not well visualized but probably normal right ventricular cavity size and normal right ventricular systolic function.   5. Left atrium is mildly dilated.   6. Mild tricuspid regurgitation.   7. Thickened mitral valve leaflets.   8. Thereis mild anterior calcification and mild posterior calcification of the mitral valve annulus.   9. Fibrocalcific aortic valve sclerosis without stenosis.  10. Estimated pulmonary artery systolic pressure is 45 mmHg.  11. No pericardial effusion seen.  12. Compared to the transthoracic echocardiogram performed on 11/25/2021, there are new regional wall motion abnormalities present and newly reduced LVEF.      < end of copied text >  [ ]  Catheterization:  [ ] Stress Test:    OTHER: 	      
                                                         [facility]                                                         CARDIOLOGY AT [facility]                                                                  [address], [place] [address]                                                         Telephone: [phone]. Fax:[phone]                                                                             PROGRESS NOTE    Reason for follow up:   CHF  Update:   Decreasing pedal edema, slight crackles left bases on 40 Lasix IV BID -2380ml  Review of symptoms:   Cardiac:  No chest pain. No dyspnea. No palpitations.  Respiratory: no cough. No dyspnea  Gastrointestinal: No diarrhea. No abdominal pain. No bleeding.   Neuro: No focal neuro complaints.    Vitals:  T(C): 36.4 (11-18-24 @ 08:20), Max: 36.8 (11-17-24 @ 11:35)  HR: 67 (11-18-24 @ 08:20) (56 - 67)  BP: 153/81 (11-18-24 @ 08:20) (136/67 - 162/70)  RR: 18 (11-18-24 @ 08:20) (18 - 19)  SpO2: 97% (11-18-24 @ 08:20) (91% - 100%)  I&O's Summary  17 Nov 2024 07:01  -  18 Nov 2024 07:00  --------------------------------------------------------  IN: 220 mL / OUT: 2600 mL / NET: -2380 mL  Weight (kg): 70.3 (11-16 @ 16:27)    PHYSICAL EXAM:  Appearance: Comfortable. No acute distress  HEENT:  Atraumatic. Normocephalic.  Normal oral mucosa  Neurologic: A & O x 3, no gross focal deficits.  Cardiovascular: RRR S1 S2, No murmur, no rubs/gallops. No JVD  Respiratory:   Crackles left lower base  Gastrointestinal:  Soft, Non-tender, + BS  Lower Extremities: 2+ Peripheral Pulses, No clubbing, cyanosis, or edema  Psychiatry: Patient is calm. No agitation.   Skin: warm and dry.    CURRENT CARDIAC MEDICATIONS:  amLODIPine   Tablet 10 milliGRAM(s) Oral daily  furosemide   Injectable 40 milliGRAM(s) IV Push two times a day  isosorbide   mononitrate ER Tablet (IMDUR) 60 milliGRAM(s) Oral daily  metoprolol succinate  milliGRAM(s) Oral daily  ranolazine 500 milliGRAM(s) Oral two times a day    CURRENT OTHER MEDICATIONS:  acetaminophen     Tablet .. 650 milliGRAM(s) Oral every 6 hours PRN Temp greater or equal to 38C (100.4F), Mild Pain (1 - 3)  gabapentin 300 milliGRAM(s) Oral at bedtime  melatonin 3 milliGRAM(s) Oral at bedtime PRN Insomnia  ondansetron Injectable 4 milliGRAM(s) IV Push every 8 hours PRN Nausea and/or Vomiting  aluminum hydroxide/magnesium hydroxide/simethicone Suspension 30 milliLiter(s) Oral every 4 hours PRN Dyspepsia  atorvastatin 80 milliGRAM(s) Oral at bedtime  dextrose 50% Injectable 25 Gram(s) IV Push once, Stop order after: 1 Doses  dextrose 50% Injectable 12.5 Gram(s) IV Push once, Stop order after: 1 Doses  dextrose 50% Injectable 25 Gram(s) IV Push once, Stop order after: 1 Doses  dextrose Oral Gel 15 Gram(s) Oral once, Stop order after: 1 Doses PRN Blood Glucose LESS THAN 70 milliGRAM(s)/deciliter  glucagon  Injectable 1 milliGRAM(s) IntraMuscular once, Stop order after: 1 Doses  insulin glargine Injectable (LANTUS) 30 Unit(s) SubCutaneous at bedtime  insulin lispro (ADMELOG) corrective regimen sliding scale   SubCutaneous three times a day before meals  aspirin enteric coated 81 milliGRAM(s) Oral daily  clopidogrel Tablet 75 milliGRAM(s) Oral daily  ferrous    sulfate 325 milliGRAM(s) Oral three times a day  heparin   Injectable 5000 Unit(s) SubCutaneous every 12 hours    LABS:	 	                        9.2    10.02 )-----------( 564      ( 18 Nov 2024 05:03 )             28.5     11-18    141  |  103  |  33.4[H]  ----------------------------<  70  4.5   |  22.0  |  1.30    Ca    9.2      18 Nov 2024 05:03  Mg     2.2     11-18    TPro  7.5  /  Alb  3.6  /  TBili  0.4  /  DBili  x   /  AST  SEE NOTE  /  ALT  SEE NOTE  /  AlkPhos  99  11-16    TELEMETRY:   V paced 83

## 2024-11-21 NOTE — DISCHARGE NOTE NURSING/CASE MANAGEMENT/SOCIAL WORK - FINANCIAL ASSISTANCE
[facility] provides services at a reduced cost to those who are determined to be eligible through [facility]’s financial assistance program. Information regarding [facility]’s financial assistance program can be found by going to [email or web address] or by calling [phone].

## 2024-11-21 NOTE — PROGRESS NOTE ADULT - ASSESSMENT
75yo F with CAD s/p PCI, HB s/p PPM, HFrEF, IDDM, HTN, HLD, CKD admitted with acute on ch HF decompensation. Echo with multiple RWMAs. After aggressively diuresing pt she now appears euvolemic. Underwent LHC today confirming MVD    Acute HF exacerbation  Tele monitoring  Strict in and Out  Daily weights  Continue IV lasix 40 BID, pending on overall response potentially tyrone change to PO in am   Fluid restriction  Cardiology recs appreciated  C/w Metoprolol  mg daily  Isodril 60 mg daily   Afterload reduction iwth Hydralazine or ACE i/ARB as able to tolerate     ARF on CKD3 with metabolic acidosis  likely due to volume overload   monitor renal function closely   renally dose medications   nephro c/s placed      Hyperkalemia  Valsartan was discontinued on her prior admission but pt continued to take   On hold at present  K stable now, repeat in am   If stable d/w Cardiology about reintroducing at lower dose to optimize GDMT (IF ABLE)    CAD s/p PCI  pt with intermittent anginal symptoms at home   TTE with regional wall motion abnormalities previously  currently pt without chest pain   Troponin and EKG with TW changes   Cont with ASA/Plavix, Ranexa and atorvastatin   s/p LHC today(final report pending)       IDDM  Cont Lantus 20u QHS   Hold metformin  ISS, hypoglycemic protocol     HTN  cont Amlodipine, Imdur and Metoprolol     DVT ppx: Heparin subQ     Dispo: Further diuresis, PT eval   Possible home in 24-48 hrs    
75yo F with PMHx of CAD s/p PCI, HB s/p PPM, HFrEF, IDDM, HTN, HLD, CKD presented to ED c/o progressive sob/RAMIREZ with associated b/l LE swelling for the past 3 days.     #Acute HF exacerbation  Tele monitoring  Strict in and Out  Daily weights  Started on IV lasix 40 BID  Fluid striction  recent TTE noted, no need to repeat    Cardiology recs appreciated  C/w Metoprolol,  Imdur    #ARF on CKD3 with metabolic acidosis  likely due to volume overload   monitor renal function closely   renally dose medications       #Hyperkalemia  Valsartan was discontinued on her prior admission but pt continued to take   cont to monitor     #CAD s/p PCI  pt with intermittent anginal symptoms at home   TTE with regional wall motion abnormalities previously  currently pt without chest pain   Troponin and EKG with TW changes   Cont with ASA/Plavix, Ranexa and atorvastatin   Cardio on board    #IDDM  Cont Lantus 30u QHS   Hold metformin  ISS, hypoglycemic protocol     #HTN  cont Amlodipine, Imdur and Metoprolol     DVT ppx: Heparin subQ     Dispo: Further diuresis, PT eval    
75yo F with PMHx of CAD s/p PCI, HB s/p PPM, HFrEF, IDDM, HTN, HLD, CKD presented to ED c/o progressive sob/RAMIREZ with associated b/l LE swelling     BRADY on CKD stage III  Agree w Lasix 40 mg po Q day  Will need to accept a degree of azotemia  Renal function remains stable  Renal US on 10/13 no hydronephrosis   ok to cont Entresto for now  s/p LHC  possible SALO contributing    HyperKalemia better ARB stopped- better    if dc needs to f/u in our office 2-3 weeks    Renally dose meds, avoid nephrotoxic agents  Monitor labs will follow
73yo F with CAD s/p PCI, HB s/p PPM, HFrEF, IDDM, HTN, HLD, CKD admitted with acute on ch HF decompensation. Echo with multiple RWMAs. After aggressively diuresing pt she now appears euvolemic. Underwent LHC today confirming MVD    Acute HF exacerbation  Tele monitoring  Strict in and Out  Daily weights  Continue IV lasix 40 BID, pending on overall response potentially tyrone change to PO in am   Fluid restriction  Cardiology recs appreciated  C/w Metoprolol  mg daily  Isodril 60 mg daily   Afterload reduction iwth Hydralazine or ACE i/ARB as able to tolerate       ARF on CKD3 with metabolic acidosis  likely due to volume overload   monitor renal function closely   renally dose medications       Hyperkalemia  Valsartan was discontinued on her prior admission but pt continued to take   On hold at present  K stable now, repeat in am   If stable d/w Cardiology about reintroducing at lower dose to optimize GDMT (IF ABLE)    CAD s/p PCI  pt with intermittent anginal symptoms at home   TTE with regional wall motion abnormalities previously  currently pt without chest pain   Troponin and EKG with TW changes   Cont with ASA/Plavix, Ranexa and atorvastatin   s/p LHC today(final report pending)       IDDM  Cont Lantus 20u QHS   Hold metformin  ISS, hypoglycemic protocol     HTN  cont Amlodipine, Imdur and Metoprolol     DVT ppx: Heparin subQ     Dispo: Further diuresis, PT eval   Possible home in 24-48 hrs    
75yo F with CAD s/p PCI, HB s/p PPM, HFrEF, IDDM, HTN, HLD, CKD admitted with acute on ch HF decompensation     Acute HF exacerbation  Tele monitoring  Strict in and Out  Daily weights  Continue Started on IV lasix 40 BID  Fluid restriction  recent TTE noted, no need to repeat    Cardiology recs appreciated  C/w Metoprolol  mg daily  Isodril 60 mg daily   Afterload reduction iwth Hydralazine or ACE i/ARB as able to tolerate     ARF on CKD3 with metabolic acidosis  likely due to volume overload   monitor renal function closely   renally dose medications       Hyperkalemia  Valsartan was discontinued on her prior admission but pt continued to take   On hold at present  K stable now, repeat in am     CAD s/p PCI  pt with intermittent anginal symptoms at home   TTE with regional wall motion abnormalities previously  currently pt without chest pain   Troponin and EKG with TW changes   Cont with ASA/Plavix, Ranexa and atorvastatin   Planned at present for LHC in am (IF able to discuss with son). Pt is agreeable if son agrees   Cardio on board    IDDM  Cont Lantus 20u QHS   Hold metformin  ISS, hypoglycemic protocol     HTN  cont Amlodipine, Imdur and Metoprolol     DVT ppx: Heparin subQ     Dispo: Further diuresis, PT eval / possible LHC    
Now s/p LHC via RRA converted to RFA with Dr. Ely tolerated procedure well. Pt arrived to recovery in NAD and HDS, RRA/RFA access site stable, no bleed/hematoma, distal pulse +,   Intraprocedural Versed 1 mg Fentanyl 75 ucg, Heparin 5000  Findings: MV CAD, optimize CHF therapy      Plan:  -Formal cath report pending  -Post procedure management/monitoring per protocol  -Access site precautions  -Radial compression band removal at 1130  -Bedrest x 2 hours post procedure  -NS 0.9% 125ml/hr x 1 bolus: post procedure SALO ppx   -Repeat ECG if any clinical indication or change on tele  -Continue current medical therapy  -Optimize and initiate GDMT  -Dual anti platelet therapy with aspirin/plavix   -Cont BB with Toprol 100 mg po daily  -Cont statin therapy with Lipitor 80 mg po qHS   -Educated regarding strict adherence with DAPT   -Educated regarding post procedure management and care  -Discussed the importance of RF modification  -Cardiac rehab info provided/referral and communication to cardiac rehab completed  -F/U outpt in 1-2 weeks with Cardiologist Dr. Allen  -DISPO: Optimize GDMT for CHF    
75 y/o female with hx of CAD, HFrEF, Mobitz II s/p PPM (MD NereydaT), PAD with chronic right LE wound, CVA, DM2, CKD3, HTN who presents with SOB, leg edema and chest tightness x3 days. Pt states that she gets out of breath while walking. She gained 6 lbs over the last week and noticed that her legs are swelling. Blood work significant for hyperkalemia 6.3, Cr 1.55, proBNP 57992. CXR reveals pulmonary edema and increased small R pleural effusion. Pt was admitted last month with similar symptoms. Echocardiogram on 10/8/24 revealed EF 45-50% and new regional WMA. Pt was offered cardiac catheterization but declined due to renal concerns. She follows with Dr. Allen. s/p [facility] on 11/19 which showed severe multivessel CAD of the OM (unchanged from 2021), distal LAD (diffuse, severe, progressed since 2021) and new severe mid-distal RCA disease collateralized from left
75 y/o female with hx of CAD, HFrEF, Mobitz II s/p PPM (MD NereydaT), PAD with chronic right LE wound, CVA, DM2, CKD3, HTN who presents with SOB, leg edema and chest tightness x3 days. Pt states that she gets out of breath while walking. She gained 6 lbs over the last week and noticed that her legs are swelling. Blood work significant for hyperkalemia 6.3, Cr 1.55, proBNP 54770. CXR reveals pulmonary edema and increased small R pleural effusion. Pt was admitted last month with similar symptoms. Echocardiogram on 10/8/24 revealed EF 45-50% and new regional WMA. Pt was offered cardiac catheterization but declined due to renal concerns. She follows with Dr. Allen. 
73 y/o female with hx of CAD, HFrEF, Mobitz II s/p PPM (MD NereydaT), PAD with chronic right LE wound, CVA, DM2, CKD3, HTN who presents with SOB, leg edema and chest tightness x3 days. Pt states that she gets out of breath while walking. She gained 6 lbs over the last week and noticed that her legs are swelling. Blood work significant for hyperkalemia 6.3, Cr 1.55, proBNP 67095. CXR reveals pulmonary edema and increased small R pleural effusion. Pt was admitted last month with similar symptoms. Echocardiogram on 10/8/24 revealed EF 45-50% and new regional WMA. Pt was offered cardiac catheterization but declined due to renal concerns. She follows with Dr. Allen.

## 2024-11-21 NOTE — DISCHARGE NOTE NURSING/CASE MANAGEMENT/SOCIAL WORK - PATIENT PORTAL LINK FT
You can access the FollowMyHealth Patient Portal offered by [facility] by registering at the following website: [email or web address]. By joining [facility]’s FollowMyHealth portal, you will also be able to view your health information using other applications (apps) compatible with our system.

## 2024-11-21 NOTE — PROGRESS NOTE ADULT - PROVIDER SPECIALTY LIST ADULT
Internal Medicine
Hospitalist
Nephrology
Hospitalist
Internal Medicine
Intervent Cardiology
Hospitalist
Cardiology

## 2024-11-21 NOTE — PROGRESS NOTE ADULT - REASON FOR ADMISSION
Decompensated HF

## 2024-11-26 PROCEDURE — 84156 ASSAY OF PROTEIN URINE: CPT

## 2024-11-26 PROCEDURE — 0241U: CPT

## 2024-11-26 PROCEDURE — 80053 COMPREHEN METABOLIC PANEL: CPT

## 2024-11-26 PROCEDURE — 84100 ASSAY OF PHOSPHORUS: CPT

## 2024-11-26 PROCEDURE — 93005 ELECTROCARDIOGRAM TRACING: CPT

## 2024-11-26 PROCEDURE — 97163 PT EVAL HIGH COMPLEX 45 MIN: CPT

## 2024-11-26 PROCEDURE — 86738 MYCOPLASMA ANTIBODY: CPT

## 2024-11-26 PROCEDURE — 84466 ASSAY OF TRANSFERRIN: CPT

## 2024-11-26 PROCEDURE — 87086 URINE CULTURE/COLONY COUNT: CPT

## 2024-11-26 PROCEDURE — 87641 MR-STAPH DNA AMP PROBE: CPT

## 2024-11-26 PROCEDURE — 87186 SC STD MICRODIL/AGAR DIL: CPT

## 2024-11-26 PROCEDURE — 97110 THERAPEUTIC EXERCISES: CPT

## 2024-11-26 PROCEDURE — 36415 COLL VENOUS BLD VENIPUNCTURE: CPT

## 2024-11-26 PROCEDURE — 97116 GAIT TRAINING THERAPY: CPT

## 2024-11-26 PROCEDURE — 84484 ASSAY OF TROPONIN QUANT: CPT

## 2024-11-26 PROCEDURE — 85025 COMPLETE CBC W/AUTO DIFF WBC: CPT

## 2024-11-26 PROCEDURE — 99285 EMERGENCY DEPT VISIT HI MDM: CPT

## 2024-11-26 PROCEDURE — T1013: CPT

## 2024-11-26 PROCEDURE — 82570 ASSAY OF URINE CREATININE: CPT

## 2024-11-26 PROCEDURE — 81001 URINALYSIS AUTO W/SCOPE: CPT

## 2024-11-26 PROCEDURE — 80048 BASIC METABOLIC PNL TOTAL CA: CPT

## 2024-11-26 PROCEDURE — 83880 ASSAY OF NATRIURETIC PEPTIDE: CPT

## 2024-11-26 PROCEDURE — 87640 STAPH A DNA AMP PROBE: CPT

## 2024-11-26 PROCEDURE — 71045 X-RAY EXAM CHEST 1 VIEW: CPT

## 2024-11-26 PROCEDURE — 83540 ASSAY OF IRON: CPT

## 2024-11-26 PROCEDURE — 86140 C-REACTIVE PROTEIN: CPT

## 2024-11-26 PROCEDURE — 84145 PROCALCITONIN (PCT): CPT

## 2024-11-26 PROCEDURE — 74176 CT ABD & PELVIS W/O CONTRAST: CPT | Mod: MC

## 2024-11-26 PROCEDURE — 83036 HEMOGLOBIN GLYCOSYLATED A1C: CPT

## 2024-11-26 PROCEDURE — 83550 IRON BINDING TEST: CPT

## 2024-11-26 PROCEDURE — 83735 ASSAY OF MAGNESIUM: CPT

## 2024-11-26 PROCEDURE — C8929: CPT

## 2024-11-26 PROCEDURE — 96375 TX/PRO/DX INJ NEW DRUG ADDON: CPT

## 2024-11-26 PROCEDURE — 87637 SARSCOV2&INF A&B&RSV AMP PRB: CPT

## 2024-11-26 PROCEDURE — 76775 US EXAM ABDO BACK WALL LIM: CPT

## 2024-11-26 PROCEDURE — 85027 COMPLETE CBC AUTOMATED: CPT

## 2024-11-26 PROCEDURE — 83605 ASSAY OF LACTIC ACID: CPT

## 2024-11-26 PROCEDURE — 96374 THER/PROPH/DIAG INJ IV PUSH: CPT

## 2024-11-26 PROCEDURE — 82728 ASSAY OF FERRITIN: CPT

## 2024-11-26 PROCEDURE — 82962 GLUCOSE BLOOD TEST: CPT

## 2024-11-26 PROCEDURE — 85652 RBC SED RATE AUTOMATED: CPT

## 2024-12-03 ENCOUNTER — TRANSCRIPTION ENCOUNTER (OUTPATIENT)
Age: 74
End: 2024-12-03

## 2024-12-03 ENCOUNTER — INPATIENT (INPATIENT)
Facility: HOSPITAL | Age: 74
LOS: 16 days | Discharge: NURSING, REHAB OR LONG-TERM CARE | DRG: 293 | End: 2024-12-20
Attending: STUDENT IN AN ORGANIZED HEALTH CARE EDUCATION/TRAINING PROGRAM | Admitting: HOSPITALIST
Payer: MEDICARE

## 2024-12-03 VITALS
HEART RATE: 65 BPM | WEIGHT: 151.02 LBS | TEMPERATURE: 98 F | DIASTOLIC BLOOD PRESSURE: 72 MMHG | HEIGHT: 62 IN | RESPIRATION RATE: 16 BRPM | OXYGEN SATURATION: 90 % | SYSTOLIC BLOOD PRESSURE: 151 MMHG

## 2024-12-03 DIAGNOSIS — Z95.0 PRESENCE OF CARDIAC PACEMAKER: Chronic | ICD-10-CM

## 2024-12-03 DIAGNOSIS — Z86.011 PERSONAL HISTORY OF BENIGN NEOPLASM OF THE BRAIN: Chronic | ICD-10-CM

## 2024-12-03 DIAGNOSIS — I50.23 ACUTE ON CHRONIC SYSTOLIC (CONGESTIVE) HEART FAILURE: ICD-10-CM

## 2024-12-03 DIAGNOSIS — Z98.890 OTHER SPECIFIED POSTPROCEDURAL STATES: Chronic | ICD-10-CM

## 2024-12-03 DIAGNOSIS — H26.9 UNSPECIFIED CATARACT: Chronic | ICD-10-CM

## 2024-12-03 DIAGNOSIS — I25.10 ATHEROSCLEROTIC HEART DISEASE OF NATIVE CORONARY ARTERY WITHOUT ANGINA PECTORIS: ICD-10-CM

## 2024-12-03 DIAGNOSIS — I10 ESSENTIAL (PRIMARY) HYPERTENSION: ICD-10-CM

## 2024-12-03 LAB
ALBUMIN SERPL ELPH-MCNC: 4.4 G/DL — SIGNIFICANT CHANGE UP (ref 3.3–5.2)
ALP SERPL-CCNC: 81 U/L — SIGNIFICANT CHANGE UP (ref 40–120)
ALT FLD-CCNC: 13 U/L — SIGNIFICANT CHANGE UP
ANION GAP SERPL CALC-SCNC: 12 MMOL/L — SIGNIFICANT CHANGE UP (ref 5–17)
ANION GAP SERPL CALC-SCNC: 17 MMOL/L — SIGNIFICANT CHANGE UP (ref 5–17)
AST SERPL-CCNC: 37 U/L — HIGH
BASOPHILS # BLD AUTO: 0.06 K/UL — SIGNIFICANT CHANGE UP (ref 0–0.2)
BASOPHILS NFR BLD AUTO: 0.4 % — SIGNIFICANT CHANGE UP (ref 0–2)
BILIRUB SERPL-MCNC: 0.4 MG/DL — SIGNIFICANT CHANGE UP (ref 0.4–2)
BUN SERPL-MCNC: 28.8 MG/DL — HIGH (ref 8–20)
BUN SERPL-MCNC: 30.2 MG/DL — HIGH (ref 8–20)
CALCIUM SERPL-MCNC: 8.8 MG/DL — SIGNIFICANT CHANGE UP (ref 8.4–10.5)
CALCIUM SERPL-MCNC: 9.8 MG/DL — SIGNIFICANT CHANGE UP (ref 8.4–10.5)
CHLORIDE SERPL-SCNC: 102 MMOL/L — SIGNIFICANT CHANGE UP (ref 96–108)
CHLORIDE SERPL-SCNC: 97 MMOL/L — SIGNIFICANT CHANGE UP (ref 96–108)
CO2 SERPL-SCNC: 21 MMOL/L — LOW (ref 22–29)
CO2 SERPL-SCNC: 21 MMOL/L — LOW (ref 22–29)
CREAT SERPL-MCNC: 1.46 MG/DL — HIGH (ref 0.5–1.3)
CREAT SERPL-MCNC: 1.59 MG/DL — HIGH (ref 0.5–1.3)
EGFR: 34 ML/MIN/1.73M2 — LOW
EGFR: 38 ML/MIN/1.73M2 — LOW
EOSINOPHIL # BLD AUTO: 0.1 K/UL — SIGNIFICANT CHANGE UP (ref 0–0.5)
EOSINOPHIL NFR BLD AUTO: 0.6 % — SIGNIFICANT CHANGE UP (ref 0–6)
FLUAV AG NPH QL: SIGNIFICANT CHANGE UP
FLUBV AG NPH QL: SIGNIFICANT CHANGE UP
GLUCOSE SERPL-MCNC: 140 MG/DL — HIGH (ref 70–99)
GLUCOSE SERPL-MCNC: 163 MG/DL — HIGH (ref 70–99)
HCT VFR BLD CALC: 31 % — LOW (ref 34.5–45)
HGB BLD-MCNC: 9.8 G/DL — LOW (ref 11.5–15.5)
IMM GRANULOCYTES NFR BLD AUTO: 0.7 % — SIGNIFICANT CHANGE UP (ref 0–0.9)
LYMPHOCYTES # BLD AUTO: 12.9 % — LOW (ref 13–44)
LYMPHOCYTES # BLD AUTO: 2.06 K/UL — SIGNIFICANT CHANGE UP (ref 1–3.3)
MCHC RBC-ENTMCNC: 26.3 PG — LOW (ref 27–34)
MCHC RBC-ENTMCNC: 31.6 G/DL — LOW (ref 32–36)
MCV RBC AUTO: 83.3 FL — SIGNIFICANT CHANGE UP (ref 80–100)
MONOCYTES # BLD AUTO: 0.98 K/UL — HIGH (ref 0–0.9)
MONOCYTES NFR BLD AUTO: 6.1 % — SIGNIFICANT CHANGE UP (ref 2–14)
NEUTROPHILS # BLD AUTO: 12.65 K/UL — HIGH (ref 1.8–7.4)
NEUTROPHILS NFR BLD AUTO: 79.3 % — HIGH (ref 43–77)
NT-PROBNP SERPL-SCNC: HIGH PG/ML (ref 0–300)
PLATELET # BLD AUTO: 480 K/UL — HIGH (ref 150–400)
POTASSIUM SERPL-MCNC: 5.5 MMOL/L — HIGH (ref 3.5–5.3)
POTASSIUM SERPL-MCNC: 6.3 MMOL/L — CRITICAL HIGH (ref 3.5–5.3)
POTASSIUM SERPL-SCNC: 5.5 MMOL/L — HIGH (ref 3.5–5.3)
POTASSIUM SERPL-SCNC: 6.3 MMOL/L — CRITICAL HIGH (ref 3.5–5.3)
PROT SERPL-MCNC: 8.5 G/DL — SIGNIFICANT CHANGE UP (ref 6.6–8.7)
RBC # BLD: 3.72 M/UL — LOW (ref 3.8–5.2)
RBC # FLD: 18.1 % — HIGH (ref 10.3–14.5)
RSV RNA NPH QL NAA+NON-PROBE: SIGNIFICANT CHANGE UP
SARS-COV-2 RNA SPEC QL NAA+PROBE: SIGNIFICANT CHANGE UP
SODIUM SERPL-SCNC: 135 MMOL/L — SIGNIFICANT CHANGE UP (ref 135–145)
SODIUM SERPL-SCNC: 135 MMOL/L — SIGNIFICANT CHANGE UP (ref 135–145)
TROPONIN T, HIGH SENSITIVITY RESULT: 40 NG/L — SIGNIFICANT CHANGE UP (ref 0–51)
TROPONIN T, HIGH SENSITIVITY RESULT: 46 NG/L — SIGNIFICANT CHANGE UP (ref 0–51)
WBC # BLD: 15.96 K/UL — HIGH (ref 3.8–10.5)
WBC # FLD AUTO: 15.96 K/UL — HIGH (ref 3.8–10.5)

## 2024-12-03 PROCEDURE — 99223 1ST HOSP IP/OBS HIGH 75: CPT

## 2024-12-03 PROCEDURE — 71045 X-RAY EXAM CHEST 1 VIEW: CPT | Mod: 26

## 2024-12-03 PROCEDURE — 99285 EMERGENCY DEPT VISIT HI MDM: CPT

## 2024-12-03 RX ORDER — FUROSEMIDE 40 MG/1
40 TABLET ORAL ONCE
Refills: 0 | Status: COMPLETED | OUTPATIENT
Start: 2024-12-03 | End: 2024-12-03

## 2024-12-03 RX ADMIN — Medication 162 MILLIGRAM(S): at 17:43

## 2024-12-03 RX ADMIN — FUROSEMIDE 40 MILLIGRAM(S): 40 TABLET ORAL at 21:37

## 2024-12-03 NOTE — H&P ADULT - ASSESSMENT
ASSESSMENT:  74F with PMHX CAD s/p PCI, HFrEF, Mobitz II s/p PPM (MIRNA Dawn), PAD with chronic right LE wound, CVA, DM2, CKD3, HTN presents to [facility] ER c/o shortness of breath/RAMIREZ and worsening LE edema admitted for Acute on Chronic HFrEF Exacerbation c/b Hyperkalemia.    PLAN:  Acute on Chronic HFrEF Exacerbation  -Admit to Tele  -Repeat Labs  -Trend Cardiac Enzymes  -COVID/FLU/RSV negative  -CXR +Pulm Vascular Congestion/CHF +mild pleural effusions  -Lasix 40mg IV BID  -Check TTE  -Strict I&Os  -Daily Wts  -Cardiology Consulted ([place])    Hyperkalemia  -K 6.3 hemolyzed. Repeat K 5.5 Lasix 40mg IV x1. Continue IV diuresis.  -Repeat BMP in AM. Monitor Telemetry    CKD  -Cr 1.46 close to baseline Cr 1.43  -Permissive Azotemia for diureiss  -Avoid Nephrotoxins  -Trend BMP    CAD s/p PCI, Mobitz Type 2 s/p MicraPPM, HTN, HLD, CVA  -Continue DAPT  -ASA 81mg q24 + Plavix 75mg q24  -Amlodipine 10mg q24  -Atorvastatin 80mg qHS  -Imdur 60mg q24  -Metoprolol Succinate 100mg q24  -Ranolazine 500mg BID  -Entresto 24/26mg PO BID -> If hyperkalemia persists would hold    IDDM  -Lantus 26 units qHS  -Hold Metformin 1g PO BID  -ISS/Accuechecks/A1C    DM Neuropathy  -Gabapentin 300mg qHS    VTE PPX: SCDs/SQH 5000q8  DISPO: Acute. Admit to inpatient for decompensated HF exacerbation pending cardio eval and diuresis. Plan for dispo home when ready.

## 2024-12-03 NOTE — CONSULT NOTE ADULT - SUBJECTIVE AND OBJECTIVE BOX
[facility]                                              CARDIOLOGY AT [place] [address]                                             Telephone: [phone]. Fax:[phone]                                                       CARDIOLOGY CONSULTATION NOTE                                                                                             History obtained by: Patient and medical record  Community Cardiologist: Dr. Allen   obtained: Yes [  ] No [  ]  Reason for Consultation: SOB  Available out pt records reviewed: Yes [x] No [  ]    Chief complaint:  shortness of breath    HPI:  Patient is a 75yo F w/ PMHx of CAD s/p PCI, HFrEF, Mobitz II s/p PPM (Nereyda, MDT), PAD with chronic right LE wound, CVA, DMT2, CKDIII, HTN presents to [facility] with SOB, leg edema.  Patient states that she gets out of breath while walking.  Patient was recently admitted at [facility] (-) after being admitted for HF.  Patient states she has been having dyspnea for the past few days, gained about 5lbs overnight and she felt decompensated.  She called Dr. Allen and was told to come in to [facility] and double her Lasix dose.  Patient states she takes Lasix 20mg but today she took 40mg.  Patient also reports associated non-productive cough for the past few weeks and non radiating mild chest pain.  Denies HA, palpitations, diaphoresis, fever, chills, abdominal pain, N/V, numbness, or weakness.   hsT-T: 40->46   pro-BNP: 91572.  CXR: pulmonary edema and increased small R pleural effusion.  TTE (10/8/24) EF 45-50% and new regional WMA. Cardiology consult called for acute on chronic sHF.     CARDIAC TESTING   10/08/2024 ECHO: CONCLUSIONS:  1. Left ventricular cavity is normal in size. Left ventricular systolic function is mildly decreased with an ejection fraction of 47 % by Taylor's method of disks with an ejection fraction visually estimated at 45 to 50 %. Regional wall motion abnormalities present.  2. Multiple segmental abnormalities exist. See findings.  3. Unable to assess left ventricular diastolic function due to insufficient data.  4. The right ventricle is not well visualized but probably normal right ventricular cavity size and normal right ventricular systolic function.  5. Left atrium is mildly dilated.  6. Mild tricuspid regurgitation.  7. Thickened mitral valve leaflets.  8. There is mild anterior calcification and mild posterior calcification of the mitral valve annulus.  9. Fibrocalcific aortic valve sclerosis without stenosis.  10. Estimated pulmonary artery systolic pressure is 45 mmHg.  11. No pericardial effusion seen.  12. Compared to the transthoracic echocardiogram performed on 2021, there are new regional wall motion abnormalities present and newly reduced LVEF.    CATH: Study Date:     2024   Name:           SHIREEN CASH   :            1950   (74 years)   Gender:         female   MR#:            [id number]   MPI#:           [id number]   Patient Class:  Inpatient   Cath Lab Report    Diagnostic Cardiologist:       Kamaljit Ely MD   Referring Physician:           Travis Allen MD   Procedures Performed   Procedures:  1.    Arterial Access - Right Radial   2.    Left Heart Cath   3.    Diagnostic Coronary Angiography   4.    Ultrasound Guided Access   5.    Arterial Access - Right Femoral   6.    AngioSeal   Indications:  Congestive heart failure   Lab Visit Indication:  LV dysfunction, suspected CAD   Pre-Diagnosis:  Congestive Heart Failure, Coronary Artery Disease   Diagnostic Conclusions:   1. Multivessel coronary artery disease with interval development of diffuse, critical in-stent restenosis (up to 99%) in RCA. Stable 95% sequential lesions in both limbs of large branching OM1. Diffuse severe (up to 95%) stenosis of the mid-apical LAD. 2. LVEDP 15 mmHg.    Recommendations:   Given absence of ACS or angina, and after reviewing case and images with second interventionalist / ACP, PCI of severe, prox-distal RCA ISR was deferred in favor of optimizing GDMT for  CHF/cardiomyopathy and outpatient stress imaging to guide revascularization strategy. Low threshold to pursue RCA revascularization should any typical or atypical anginal symptoms  develop.    Acute complication:    No complications   Patient: SHIREEN CASH        MRN: [id number]   Study Date: 2024    ELECTROPHYSIOLOGY:     PAST MEDICAL HISTORY  DM (diabetes mellitus)  HTN (hypertension)  Acute otitis media, unspecified otitis media type  H/O gastroesophageal reflux (GERD)  Cardiac pacemaker  CVA (cerebrovascular accident)  CVA (cerebrovascular accident)  PAD (peripheral artery disease)  Wound of right leg  Systolic HF    PAST SURGICAL HISTORY  History of benign brain tumor  Cataract  History of biopsy  Cardiac pacemaker  S/P angiogram of extremity  s/p Cardiac Cath    SOCIAL HISTORY:  Denies smoking/alcohol/drugs    FAMILY HISTORY:  FH: asthma  Son    Family History of Cardiovascular Disease:  Yes [  ] No [  ]  Coronary Artery Disease in first degree relative: Yes [  ] No [  ]  Sudden Cardiac Death in First degree relative: Yes [  ] No [  ]    HOME MEDICATIONS:  amLODIPine 10 mg oral tablet: 1 tab(s) orally once a day (2024 14:47)  ferrous sulfate 325 mg (65 mg elemental iron) oral tablet: 1 tab(s) orally 3 times a day (2024 14:47)  gabapentin 300 mg oral capsule: 1 cap(s) orally once a day (at bedtime) (2024 14:47)  insulin glargine 100 units/mL subcutaneous solution: 30 unit(s) subcutaneous once a day (at bedtime) (2024 03:16)  isosorbide mononitrate 60 mg oral tablet, extended release: 1 tab(s) orally once a day (2024 14:47)  metoprolol succinate 100 mg oral tablet, extended release: 1 tab(s) orally once a day (2024 14:47)  ranolazine 500 mg oral tablet, extended release: 1 tab(s) orally 2 times a day (2024 14:47)    ALLERGIES: No Known Allergies    REVIEW OF SYMPTOMS:   CONSTITUTIONAL: No fever, no chills, no weight loss, + weight gain, + fatigue   ENMT: No vertigo; No sinus or throat pain  NECK: No pain or stiffness  CARDIOVASCULAR: + chest pain, + dyspnea, + RAMIREZ, no syncope/presyncope, no palpitations, no dizziness, no orthopnea, no Paroxsymal nocturnal dyspnea  RESPIRATORY: no Shortness of breath, no cough, no wheezing  GI: no nausea, no diarrhea, no constipation, no abdominal pain   NEURO: No headache, no slurred speech   MUSCULOSKELETAL: No joint pain or swelling  PSYCH: No agitation, no anxiety  ALL OTHER REVIEW OF SYSTEMS ARE NEGATIVE.    VITAL SIGNS:  T(C): 36.7 (24 @ 19:07), Max: 36.7 (24 @ 15:29)  T(F): 98 (24 @ 19:07), Max: 98.1 (24 @ 15:29)  HR: 65 (24 @ 19:07) (65 - 65)  BP: 131/64 (24 @ 19:07) (131/64 - 151/72)  RR: 18 (24 @ 19:07) (16 - 18)  SpO2: 94% (24 @ 19:07) (90% - 94%)    INTAKE AND OUTPUT:     PHYSICAL EXAM:  Constitutional: Comfortable, no acute distress   HEENT: Atraumatic, neck is supple, no JVD  CNS: A&Ox3. No focal deficits   Respiratory: CTAB, unlabored   Cardiovascular: RRR normal S1S2, no murmur or rubs  Gastrointestinal: Soft, non-tender   Extremities: 2+ Peripheral Pulses, no cyanosis, or edema  Psychiatric: Calm  Skin: Warm and dry, no ulcers on extremities     LABS:                       9.8    15.96 )-----------( 480      ( 03 Dec 2024 17:00 )             31.0         135  |  102  |  30.2[H]  ----------------------------<  163[H]  5.5[H]   |  21.0[L]  |  1.59[H]    Ca    8.8      03 Dec 2024 19:00    TPro  8.5  /  Alb  4.4  /  TBili  0.4  /  DBili  x   /  AST  37[H]  /  ALT  13  /  AlkPhos  81  12    Urinalysis Basic - ( 03 Dec 2024 19:00 )  Color: x / Appearance: x / SG: x / pH: x  Gluc: 163 mg/dL / Ketone: x  / Bili: x / Urobili: x   Blood: x / Protein: x / Nitrite: x   Leuk Esterase: x / RBC: x / WBC x   Sq Epi: x / Non Sq Epi: x / Bacteria: x    INTERPRETATION OF TELEMETRY: V-paced  ECG: V-paced   Prior ECG: Yes [x] No [  ]    RADIOLOGY & ADDITIONAL STUDIES:   X-ray:  Pen                                              [facility]                                              CARDIOLOGY AT [place] [address]                                             Telephone: [phone]. Fax:[phone]                                                       CARDIOLOGY CONSULTATION NOTE                                                                                             History obtained by: Patient and medical record  Community Cardiologist: Dr. Allen   obtained: Yes [  ] No [  ]  Reason for Consultation: SOB  Available out pt records reviewed: Yes [x] No [  ]    Chief complaint:  shortness of breath    HPI:  Patient is a 75yo F w/ PMHx of CAD s/p PCI, HFrEF, Mobitz II s/p PPM (Nereyda, MDT), PAD with chronic right LE wound, CVA, DMT2, CKDIII, HTN presents to [facility] with SOB, leg edema.  Patient states that she gets out of breath while walking.  Patient was recently admitted at [facility] (-) after being admitted for HF.  Patient states she has been having dyspnea for the past few days, gained about 5lbs since last week and feels the same as during her prior admissions.  She called Dr. Allen and was told to come in to [facility] and double her Lasix dose.  Patient states she takes Lasix 40mg daily and today she took an extra 20mg.  Patient also reports associated non-productive cough for the past few weeks and non radiating mild chest pain.  Denies HA, palpitations, diaphoresis, fever, chills, abdominal pain, N/V, numbness, or weakness.   hsT-T: 40->46   pro-BNP: 57833.  CXR: pulmonary edema  Cardiology consult called for acute on chronic sHF.     CARDIAC TESTING   10/08/2024 ECHO: CONCLUSIONS:  1. Left ventricular cavity is normal in size. Left ventricular systolic function is mildly decreased with an ejection fraction of 47 % by Taylor's method of disks with an ejection fraction visually estimated at 45 to 50 %. Regional wall motion abnormalities present.  2. Multiple segmental abnormalities exist. See findings.  3. Unable to assess left ventricular diastolic function due to insufficient data.  4. The right ventricle is not well visualized but probably normal right ventricular cavity size and normal right ventricular systolic function.  5. Left atrium is mildly dilated.  6. Mild tricuspid regurgitation.  7. Thickened mitral valve leaflets.  8. There is mild anterior calcification and mild posterior calcification of the mitral valve annulus.  9. Fibrocalcific aortic valve sclerosis without stenosis.  10. Estimated pulmonary artery systolic pressure is 45 mmHg.  11. No pericardial effusion seen.  12. Compared to the transthoracic echocardiogram performed on 2021, there are new regional wall motion abnormalities present and newly reduced LVEF.    CATH: Study Date:     2024   Name:           SHIREEN CASH   :            1950   (74 years)   Gender:         female   MR#:            [id number]   MPI#:           [id number]   Patient Class:  Inpatient   Cath Lab Report    Diagnostic Cardiologist:       Kamaljit Ely MD   Referring Physician:           Travis Allen MD   Procedures Performed   Procedures:  1.    Arterial Access - Right Radial   2.    Left Heart Cath   3.    Diagnostic Coronary Angiography   4.    Ultrasound Guided Access   5.    Arterial Access - Right Femoral   6.    AngioSeal   Indications:  Congestive heart failure   Lab Visit Indication:  LV dysfunction, suspected CAD   Pre-Diagnosis:  Congestive Heart Failure, Coronary Artery Disease   Diagnostic Conclusions:   1. Multivessel coronary artery disease with interval development of diffuse, critical in-stent restenosis (up to 99%) in RCA. Stable 95% sequential lesions in both limbs of large branching OM1. Diffuse severe (up to 95%) stenosis of the mid-apical LAD. 2. LVEDP 15 mmHg.    Recommendations:   Given absence of ACS or angina, and after reviewing case and images with second interventionalist / ACP, PCI of severe, prox-distal RCA ISR was deferred in favor of optimizing GDMT for  CHF/cardiomyopathy and outpatient stress imaging to guide revascularization strategy. Low threshold to pursue RCA revascularization should any typical or atypical anginal symptoms  develop.    Acute complication:    No complications   Patient: SHIREEN CASH        MRN: [id number]   Study Date: 2024    ELECTROPHYSIOLOGY:     PAST MEDICAL HISTORY  DM (diabetes mellitus)  HTN (hypertension)  Acute otitis media, unspecified otitis media type  H/O gastroesophageal reflux (GERD)  Cardiac pacemaker  CVA (cerebrovascular accident)  CVA (cerebrovascular accident)  PAD (peripheral artery disease)  Wound of right leg  Systolic HF    PAST SURGICAL HISTORY  History of benign brain tumor  Cataract  History of biopsy  Cardiac pacemaker  S/P angiogram of extremity  s/p Cardiac Cath    SOCIAL HISTORY:  Denies smoking/alcohol/drugs    FAMILY HISTORY:  FH: asthma  Son    Family History of Cardiovascular Disease:  Yes [  ] No [  ]  Coronary Artery Disease in first degree relative: Yes [  ] No [  ]  Sudden Cardiac Death in First degree relative: Yes [  ] No [  ]    HOME MEDICATIONS:  amLODIPine 10 mg oral tablet: 1 tab(s) orally once a day (2024 14:47)  ferrous sulfate 325 mg (65 mg elemental iron) oral tablet: 1 tab(s) orally 3 times a day (2024 14:47)  gabapentin 300 mg oral capsule: 1 cap(s) orally once a day (at bedtime) (2024 14:47)  insulin glargine 100 units/mL subcutaneous solution: 30 unit(s) subcutaneous once a day (at bedtime) (2024 03:16)  isosorbide mononitrate 60 mg oral tablet, extended release: 1 tab(s) orally once a day (2024 14:47)  metoprolol succinate 100 mg oral tablet, extended release: 1 tab(s) orally once a day (2024 14:47)  ranolazine 500 mg oral tablet, extended release: 1 tab(s) orally 2 times a day (2024 14:47)    ALLERGIES: No Known Allergies    REVIEW OF SYMPTOMS:   CONSTITUTIONAL: No fever, no chills, no weight loss, + weight gain, + fatigue   ENMT: No vertigo; No sinus or throat pain  NECK: No pain or stiffness  CARDIOVASCULAR: + chest pain, + dyspnea, + RAMIREZ, no syncope/presyncope, no palpitations, no dizziness, no orthopnea, no Paroxsymal nocturnal dyspnea  RESPIRATORY: no Shortness of breath, no cough, no wheezing  GI: no nausea, no diarrhea, no constipation, no abdominal pain   NEURO: No headache, no slurred speech   MUSCULOSKELETAL: No joint pain or swelling  PSYCH: No agitation, no anxiety  ALL OTHER REVIEW OF SYSTEMS ARE NEGATIVE.    VITAL SIGNS:  T(C): 36.7 (24 @ 19:07), Max: 36.7 (24 @ 15:29)  T(F): 98 (24 @ 19:07), Max: 98.1 (24 @ 15:29)  HR: 65 (24 @ 19:07) (65 - 65)  BP: 131/64 (24 @ 19:07) (131/64 - 151/72)  RR: 18 (24 @ 19:07) (16 - 18)  SpO2: 94% (12-24 @ 19:07) (90% - 94%)    INTAKE AND OUTPUT:     PHYSICAL EXAM:  Constitutional: Comfortable, no acute distress   HEENT: Atraumatic, neck is supple, no JVD  CNS: A&Ox3. No focal deficits   Respiratory: CTAB, unlabored   Cardiovascular: RRR normal S1S2, no murmur or rubs  Gastrointestinal: Soft, non-tender   Extremities: 2+ Peripheral Pulses, no cyanosis, 2+ b/l LE pitting edema  Psychiatric: Calm  Skin: Warm and dry, no ulcers on extremities     LABS:                       9.8    15.96 )-----------( 480      ( 03 Dec 2024 17:00 )             31.0     12    135  |  102  |  30.2[H]  ----------------------------<  163[H]  5.5[H]   |  21.0[L]  |  1.59[H]    Ca    8.8      03 Dec 2024 19:00    TPro  8.5  /  Alb  4.4  /  TBili  0.4  /  DBili  x   /  AST  37[H]  /  ALT  13  /  AlkPhos  81  12-03    Urinalysis Basic - ( 03 Dec 2024 19:00 )  Color: x / Appearance: x / SG: x / pH: x  Gluc: 163 mg/dL / Ketone: x  / Bili: x / Urobili: x   Blood: x / Protein: x / Nitrite: x   Leuk Esterase: x / RBC: x / WBC x   Sq Epi: x / Non Sq Epi: x / Bacteria: x    INTERPRETATION OF TELEMETRY: V-paced  ECG: V-paced   Prior ECG: Yes [x] No [  ]    RADIOLOGY & ADDITIONAL STUDIES:   X-ray:  Pen

## 2024-12-03 NOTE — H&P ADULT - NSHPLABSRESULTS_GEN_ALL_CORE
TTE (10/8/24) EF 45-50% and new regional WMA.    CXR IMPRESSION:   Mild CHF and small bilateral pleural effusions. Cardiomegaly.

## 2024-12-03 NOTE — H&P ADULT - PATIENT'S PREFERRED PRONOUN
JONELLE:     from [facility] Anant iqbal [phone] came to evaluate patient. She stated that she does not want to take patient back in his current condition and will follow him at [facility] rehab.     12:41 CM spoke to [facility] at [address] to get update regarding a balance owed by the patient to the facility. She has not been able to reach the patient's son to discuss payment.     Christian Ortiz, JIE/CRM Her/She

## 2024-12-03 NOTE — CONSULT NOTE ADULT - NS ATTEND AMEND GEN_ALL_CORE FT
seen with above,    74F Icelandic-speaking ( # [id number]) history significant for HTN, DM2, CKD 3, heart block s/p Micra PPM, CVA, CAD/stent, PAD, CVA, HFmrEF with recurrent hospitalization for HF in 10/8-10/14 and 11/16-11/21/24 underwent repeat LHC on 11/19/24 with critical ISR of RCA stent 99% and 95% OM1 with 95% [id number] LAD no PCI perform yet for optimization of CHF, discharged on Lasix 40mg once daily with low dose Entresto had mild BRADY, interval with worsening dyspnea despite extra Lasix use prompted return to the ER and readmitted for ADHF, pBNP 22K (prior 16K), Cr. 1.7 and Hb 7.8    -dyspneic on nasal canula, continue IV Lasix 40mg BID   -monitor Cr. with diuresis, defer restarting Entresto for now, may benefit from CardioMems implant to prevent recurrent HF admission, also needs prior medication reconciliation and literacy education as per her primary cardiologist Dr. Allen patient's has repeated office visits unclear of exact medications she should be on, last seen in office 10/2024   -discussed with interventionalist given recurrent ADHF admissions warrant for PCI attempt of the RCA, will continue to optimize for Friday, if HB remains <8 to transfuse PRBC x2 units prior to PCI  -continue DAPT on clopidogrel, she denies bleeding, likely anemia of chronic disease   -continue amlodipine, metoprolol, Imdur and Ranexa   -repeat Echo pending to reassess LV EF, if remains <50% consider EP eval. for CRT upgrade given chronic RV pacing 100%   -updated patient's  at he bedside and her son (Balaji) over the phone      Mendoza Pham DO, [facility]  Faculty Non-Invasive Cardiologist  [phone] patient seen and examined on 12/4/24,    74F Tunisian-speaking ( # [id number]) history significant for HTN, DM2, CKD 3, heart block s/p Micra PPM, CVA, CAD/stent, PAD, CVA, HFmrEF with recurrent hospitalization for HF in 10/8-10/14 and 11/16-11/21/24 underwent repeat LHC on 11/19/24 with critical ISR of RCA stent 99% and 95% OM1 with 95% [id number] LAD no PCI perform yet for optimization of CHF, discharged on Lasix 40mg once daily with low dose Entresto had mild BRADY, interval with worsening dyspnea despite extra Lasix use prompted return to the ER and readmitted for ADHF, pBNP 22K (prior 16K), Cr. 1.7 and Hb 7.8    -dyspneic on nasal canula, continue IV Lasix 40mg BID   -monitor Cr. with diuresis, defer restarting Entresto for now, may benefit from CardioMems implant to prevent recurrent HF admission, also needs prior medication reconciliation and literacy education as per her primary cardiologist Dr. Allen patient's has repeated office visits unclear of exact medications she should be on, last seen in office 10/2024   -discussed with interventionalist given recurrent ADHF admissions warrant for PCI attempt of the RCA, will continue to optimize for Friday, if HB remains <8 to transfuse PRBC x2 units prior to PCI  -continue DAPT on clopidogrel, she denies bleeding, likely anemia of chronic disease   -continue amlodipine, metoprolol, Imdur and Ranexa   -repeat Echo pending to reassess LV EF, if remains <50% consider EP eval. for CRT upgrade given chronic RV pacing 100%   -updated patient's  at he bedside and her son (Balaji) over the phone      Mendoza Pham DO, [facility]  Faculty Non-Invasive Cardiologist  [phone]

## 2024-12-03 NOTE — CONSULT NOTE ADULT - PROBLEM SELECTOR RECOMMENDATION 3
Assess for target organ damage (CKD, papilledema, encephalopathy) BP goal<130/80mmHg  - lifestyle modifications (DASH diet, daily exercise encouraged, weight loss, limit ETOH intake, avoid NSAIDs)   - VS as per unit protocol  - pharmacologic options: Loops w/ GFR<30ml/min), ARBs, CCBs, BB    case d/w Dr. Pham

## 2024-12-03 NOTE — ED PROVIDER NOTE - PROGRESS NOTE DETAILS
Corine: pt signed out to me pending cards/admission. recent chf admission. pt gets hypoxic with any exertion. cards recommending admission.

## 2024-12-03 NOTE — H&P ADULT - NSHPPHYSICALEXAM_GEN_ALL_CORE
Vital Signs Last 24 Hrs  T(C): 36.7 (03 Dec 2024 23:18), Max: 36.7 (03 Dec 2024 15:29)  T(F): 98.1 (03 Dec 2024 23:18), Max: 98.1 (03 Dec 2024 15:29)  HR: 65 (03 Dec 2024 23:18) (65 - 65)  BP: 120/64 (03 Dec 2024 23:18) (120/64 - 151/72)  BP(mean): --  RR: 18 (03 Dec 2024 23:18) (16 - 18)  SpO2: 90% (03 Dec 2024 23:18) (90% - 94%)    Parameters below as of 03 Dec 2024 19:07  Patient On (Oxygen Delivery Method): room air    Constitutional: NAD, VSS  Head: NC/AT  Eyes: PERRL, EOMI, anicteric sclera, conjunctiva WNL  ENT: Normal Pharynx, MMM, No tonsillar exudate/erythema  Neck: Supple, Non-tender  Chest: Non-tender, no rashes  Cardio: RRR, s1/s2, no appreciable murmurs/rubs/gallops  Resp: +Bibasilar Crackles no wheezing  Abd: Soft, Non-tender, Non-distended, no rebound/guarding/rigidity  : not examined  Rectal: not examined  MSK: moving all extremities, no motor weakness, full ROM x4  Ext: palpable distal pulses, good capillary refill, +LE edema  Psych: appropriate, cooperative  Neuro: CN II-XII grossly intact, no focal deficits  Skin: Warm/Dry. No rashes.

## 2024-12-03 NOTE — ED PROVIDER NOTE - ATTENDING CONTRIBUTION TO CARE
4-year-old female with history of CAD, CHF, CHF, DM, status post PPM status post PCI status post recent admission for CHF exacerbation presents ED complaining of lower extremity swelling with dyspnea on exertion times last 3 days.  Patient spoke with her cardiologist/Dr. Allen and was advised to take an extra dose of Lasix and come to the ED for evaluation.  Patient does admit to nonradiating chest pain as well as nonproductive cough which has been getting progressively worse as well.  Patient denies any associated abdominal pain, fever, chills nausea vomiting or diarrhea.  On exam patient awake and alert in no acute respiratory distress,  PERRL, mm moist, neck supple, heart regular, lungs with bibasilar crackles,  abdomen soft no localized tenderness, extremities positive edema, neuro no focal deficits.  Will check labs including BNP cardiac enzymes check chest x-ray consult cardiology and re-eval

## 2024-12-03 NOTE — ED PROVIDER NOTE - CLINICAL SUMMARY MEDICAL DECISION MAKING FREE TEXT BOX
75 yo F hx CAD s/p PCI, PAD with chronic RLE wound, HFpEF, HTN, cardiac pacemaker for Mobitz type II, CVA, DM, CKD3 presenting with dyspnea. Plan for labs, cxr, ekg. Cardio.

## 2024-12-03 NOTE — ED PROVIDER NOTE - PHYSICAL EXAMINATION
VITAL SIGNS: I have reviewed nursing notes and confirm.  CONSTITUTIONAL:  in no acute distress.  SKIN: Skin exam is warm and dry, no acute rash.  HEAD: Normocephalic; atraumatic.  EYES: PERRL, EOM intact; conjunctiva and sclera clear.  ENT: No nasal discharge; airway clear. Throat clear.  NECK: Supple; non tender.    CARD: Regular rate and rhythm.  RESP: (+) crackles rt base. No wheezes.   ABD:  soft; non-distended; non-tender;   EXT: Normal ROM. No clubbing, cyanosis or edema.  NEURO: Alert, oriented. Grossly unremarkable. No focal deficits.  moves all extremities.   PSYCH: Cooperative, appropriate. labor/delivery

## 2024-12-03 NOTE — ED ADULT TRIAGE NOTE - CHIEF COMPLAINT QUOTE
"problem breathing. her oxygen level was low. gaining weight overnight." family reports CHF history.

## 2024-12-03 NOTE — CONSULT NOTE ADULT - ASSESSMENT
73yo F w/ CAD s/p PCI, HFrEF, Mobitz II s/p PPM (MIRNA Dawn), PAD with chronic right LE wound, CVA, DMT2, CKDIII, HTN presents to [facility] with SOB, leg edema.  Patient states that she gets out of breath while walking.  Patient was recently admitted at [facility] (11/16-11/21) after being admitted for HF.  Patient states she has been having dyspnea for the past few days, gained about 5lbs overnight and she felt decompensated.  She called Dr. Allen and was told to come in to [facility] and double her Lasix dose.  Patient states she takes Lasix 20mg but today she took 40mg.  Patient also reports associated non-productive cough for the past few weeks and non radiating mild chest pain.  Denies HA, palpitations, diaphoresis, fever, chills, abdominal pain, N/V, numbness, or weakness.   hsT-T: 40->46   pro-BNP: 19679.  CXR: pulmonary edema.  TTE (10/8/24) EF 45-50% and new regional WMA. Cardiology consult called for acute on chronic sHF. 75yo F w/ CAD s/p PCI, HFrEF, Mobitz II s/p PPM (MIRNA Dawn), PAD with chronic right LE wound, CVA, DMT2, CKDIII, HTN presents to [facility] with SOB, leg edema and weight gain.  Patient also reports associated non-productive cough for the past few weeks and non radiating mild chest pain.  Denies HA, palpitations, diaphoresis, fever, chills, abdominal pain, N/V, numbness, or weakness.   hsT-T: 40->46   pro-BNP: 81705.  CXR: pulmonary edema  recent TTE (10/8/24) EF 45-50% and new regional WMA.  Cardiology consult called for acute on chronic sHF.

## 2024-12-03 NOTE — ED ADULT NURSE NOTE - NSFALLUNIVINTERV_ED_ALL_ED
Bed/Stretcher in lowest position, wheels locked, appropriate side rails in place/Call bell, personal items and telephone in reach/Instruct patient to call for assistance before getting out of bed/chair/stretcher/Non-slip footwear applied when patient is off stretcher/Bonnie to call system/Physically safe environment - no spills, clutter or unnecessary equipment/Purposeful proactive rounding/Room/bathroom lighting operational, light cord in reach

## 2024-12-03 NOTE — H&P ADULT - HISTORY OF PRESENT ILLNESS
Pt seen/examined prior to midnight on 12/3/24.    74F with PMHX CAD s/p PCI, HFrEF, Mobitz II s/p PPM (Nereyda, MDT), PAD with chronic right LE wound, CVA, DM2, CKD3, HTN presents to [facility] ER c/o shortness of breath/RAMIREZ and worsening LE edema. ROS +nonradiating CP and nonproductive cough. Pt recently admitted for decompensated CHF and was doing well until she noted increased weight gain and called her Cardiologist Dr Allen who advised her to come to ER for further evaluation. Pt normally takes Lasix 20mg daily but took 40mg today PTA with improvement. CXR with congestive findings and mild effusions bilaterally. Crackles and pitting edema on exam. Labs notable for leukocytosis, CKD, and hyperkalemia.     ROS negative unless mentioned.

## 2024-12-03 NOTE — ED PROVIDER NOTE - OBJECTIVE STATEMENT
73 yo F hx CAD s/p PCI, PAD with chronic RLE wound, HFpEF, HTN, cardiac pacemaker for Mobitz type II, CVA, DM, CKD3 presenting with dyspnea. Patient was recently dc here 11/20 after being admitted for several days for same cc, had an nstemi but no stent. Patient states she has been having dyspnea for the past few days, 5lb weight gain overnight. Called cardio Dr. Allen and told to come in and double her lasix. Patient states she takes 20, today she took 40. Associated with non-productive cough for the past few weeks, central nonradiating chest pain. Denies fever, chills, abdominal pain, N/V/D, numbness or tingling. 75 yo F hx CAD s/p PCI, PAD with chronic RLE wound, HFpEF, HTN, cardiac pacemaker for Mobitz type II, CVA, DM, CKD3 presenting with dyspnea. Patient was recently admitted here 11/16-11/21 after being admitted for decomp CHF. Patient states she has been having dyspnea for the past few days, 5lb weight gain overnight. Called cardio Dr. Allen and told to come in and double her lasix. Patient states she takes 20, today she took 40. Associated with non-productive cough for the past few weeks, central nonradiating chest pain. Denies fever, chills, abdominal pain, N/V/D, numbness or tingling.

## 2024-12-03 NOTE — ED ADULT NURSE NOTE - OBJECTIVE STATEMENT
Pt is A&OX3 c/o SOB, CP, LLE edema, cough X few days. Per son at bedside she has a HX of CHF and was informed by the cardiologist to take extra Lasix. Denies any at home oxygen use. denies any abdominal pain, dizziness, fever, chills, N/V. Upon assessment edema to BL LLE . V paced, HR 69 on tele. Spo2 95% on RA. Respirations even and unlabored. Bed in lowest position with wheels locked. Pt able to make needs known. Pt is A&OX3 c/o SOB, CP, LLE edema, cough X few days. Per son at bedside she has a HX of CHF and was informed by the cardiologist to take extra Lasix DT a 5 pound weight gain since yesterday. Denies any at home oxygen use. denies any abdominal pain, dizziness, fever, chills, N/V. Upon assessment edema to BL LLE . V paced, HR 69 on tele. Spo2 95% on RA. Respirations even and unlabored. Bed in lowest position with wheels locked. Pt able to make needs known.

## 2024-12-03 NOTE — CONSULT NOTE ADULT - PROBLEM SELECTOR RECOMMENDATION 2
Monitor on Tele for acute arrhythmias or angina symptoms  - check labs including CBC, BMP, hsT-T, FLP and A1C in AM, check ECG and CXR tonight  - continue home ASA, Plavix, BB, Statin and low cholesterol diet, monitor LFTs  - TTE to assess functional/structural status  - Imdur and Ranexa for anginal symptom relief  - pain management as needed (NTG/MSO4)

## 2024-12-04 LAB
ALBUMIN SERPL ELPH-MCNC: 3.8 G/DL — SIGNIFICANT CHANGE UP (ref 3.3–5.2)
ALP SERPL-CCNC: 68 U/L — SIGNIFICANT CHANGE UP (ref 40–120)
ALT FLD-CCNC: 8 U/L — SIGNIFICANT CHANGE UP
ANION GAP SERPL CALC-SCNC: 12 MMOL/L — SIGNIFICANT CHANGE UP (ref 5–17)
AST SERPL-CCNC: 13 U/L — SIGNIFICANT CHANGE UP
BASOPHILS # BLD AUTO: 0.05 K/UL — SIGNIFICANT CHANGE UP (ref 0–0.2)
BASOPHILS NFR BLD AUTO: 0.3 % — SIGNIFICANT CHANGE UP (ref 0–2)
BILIRUB SERPL-MCNC: 0.3 MG/DL — LOW (ref 0.4–2)
BUN SERPL-MCNC: 36.8 MG/DL — HIGH (ref 8–20)
CALCIUM SERPL-MCNC: 9.2 MG/DL — SIGNIFICANT CHANGE UP (ref 8.4–10.5)
CHLORIDE SERPL-SCNC: 103 MMOL/L — SIGNIFICANT CHANGE UP (ref 96–108)
CO2 SERPL-SCNC: 21 MMOL/L — LOW (ref 22–29)
CREAT SERPL-MCNC: 1.74 MG/DL — HIGH (ref 0.5–1.3)
EGFR: 31 ML/MIN/1.73M2 — LOW
EOSINOPHIL # BLD AUTO: 0.01 K/UL — SIGNIFICANT CHANGE UP (ref 0–0.5)
EOSINOPHIL NFR BLD AUTO: 0.1 % — SIGNIFICANT CHANGE UP (ref 0–6)
FOLATE SERPL-MCNC: >20 NG/ML — SIGNIFICANT CHANGE UP
GLUCOSE BLDC GLUCOMTR-MCNC: 197 MG/DL — HIGH (ref 70–99)
GLUCOSE BLDC GLUCOMTR-MCNC: 243 MG/DL — HIGH (ref 70–99)
GLUCOSE BLDC GLUCOMTR-MCNC: 257 MG/DL — HIGH (ref 70–99)
GLUCOSE BLDC GLUCOMTR-MCNC: 266 MG/DL — HIGH (ref 70–99)
GLUCOSE SERPL-MCNC: 188 MG/DL — HIGH (ref 70–99)
HCT VFR BLD CALC: 23 % — LOW (ref 34.5–45)
HCT VFR BLD CALC: 24.6 % — LOW (ref 34.5–45)
HGB BLD-MCNC: 7.5 G/DL — LOW (ref 11.5–15.5)
HGB BLD-MCNC: 7.8 G/DL — LOW (ref 11.5–15.5)
IMM GRANULOCYTES NFR BLD AUTO: 0.7 % — SIGNIFICANT CHANGE UP (ref 0–0.9)
IRON SATN MFR SERPL: 10 UG/DL — LOW (ref 37–145)
IRON SATN MFR SERPL: 10 UG/DL — LOW (ref 37–145)
IRON SATN MFR SERPL: 4 % — LOW (ref 14–50)
LYMPHOCYTES # BLD AUTO: 1.92 K/UL — SIGNIFICANT CHANGE UP (ref 1–3.3)
LYMPHOCYTES # BLD AUTO: 13 % — SIGNIFICANT CHANGE UP (ref 13–44)
MAGNESIUM SERPL-MCNC: 2.1 MG/DL — SIGNIFICANT CHANGE UP (ref 1.6–2.6)
MCHC RBC-ENTMCNC: 26.4 PG — LOW (ref 27–34)
MCHC RBC-ENTMCNC: 31.7 G/DL — LOW (ref 32–36)
MCV RBC AUTO: 83.1 FL — SIGNIFICANT CHANGE UP (ref 80–100)
MONOCYTES # BLD AUTO: 1.13 K/UL — HIGH (ref 0–0.9)
MONOCYTES NFR BLD AUTO: 7.7 % — SIGNIFICANT CHANGE UP (ref 2–14)
NEUTROPHILS # BLD AUTO: 11.5 K/UL — HIGH (ref 1.8–7.4)
NEUTROPHILS NFR BLD AUTO: 78.2 % — HIGH (ref 43–77)
PHOSPHATE SERPL-MCNC: 3 MG/DL — SIGNIFICANT CHANGE UP (ref 2.4–4.7)
PLATELET # BLD AUTO: 397 K/UL — SIGNIFICANT CHANGE UP (ref 150–400)
POTASSIUM SERPL-MCNC: 4.9 MMOL/L — SIGNIFICANT CHANGE UP (ref 3.5–5.3)
POTASSIUM SERPL-SCNC: 4.9 MMOL/L — SIGNIFICANT CHANGE UP (ref 3.5–5.3)
PROT SERPL-MCNC: 6.9 G/DL — SIGNIFICANT CHANGE UP (ref 6.6–8.7)
RBC # BLD: 2.96 M/UL — LOW (ref 3.8–5.2)
RBC # FLD: 18.4 % — HIGH (ref 10.3–14.5)
SODIUM SERPL-SCNC: 136 MMOL/L — SIGNIFICANT CHANGE UP (ref 135–145)
TIBC SERPL-MCNC: 285 UG/DL — SIGNIFICANT CHANGE UP (ref 220–430)
TRANSFERRIN SERPL-MCNC: 199 MG/DL — SIGNIFICANT CHANGE UP (ref 192–382)
TROPONIN T, HIGH SENSITIVITY RESULT: 70 NG/L — HIGH (ref 0–51)
TSH SERPL-MCNC: 1.47 UIU/ML — SIGNIFICANT CHANGE UP (ref 0.27–4.2)
VIT B12 SERPL-MCNC: 313 PG/ML — SIGNIFICANT CHANGE UP (ref 232–1245)
WBC # BLD: 14.72 K/UL — HIGH (ref 3.8–10.5)
WBC # FLD AUTO: 14.72 K/UL — HIGH (ref 3.8–10.5)

## 2024-12-04 PROCEDURE — 99222 1ST HOSP IP/OBS MODERATE 55: CPT

## 2024-12-04 PROCEDURE — 73630 X-RAY EXAM OF FOOT: CPT | Mod: 26,RT

## 2024-12-04 PROCEDURE — 99233 SBSQ HOSP IP/OBS HIGH 50: CPT

## 2024-12-04 RX ORDER — ACETAMINOPHEN, DIPHENHYDRAMINE HCL, PHENYLEPHRINE HCL 325; 25; 5 MG/1; MG/1; MG/1
3 TABLET ORAL AT BEDTIME
Refills: 0 | Status: DISCONTINUED | OUTPATIENT
Start: 2024-12-04 | End: 2024-12-20

## 2024-12-04 RX ORDER — GLUCAGON INJECTION, SOLUTION 0.5 MG/.1ML
1 INJECTION, SOLUTION SUBCUTANEOUS ONCE
Refills: 0 | Status: DISCONTINUED | OUTPATIENT
Start: 2024-12-04 | End: 2024-12-20

## 2024-12-04 RX ORDER — FUROSEMIDE 40 MG/1
40 TABLET ORAL
Refills: 0 | Status: DISCONTINUED | OUTPATIENT
Start: 2024-12-04 | End: 2024-12-07

## 2024-12-04 RX ORDER — HEPARIN SODIUM,PORCINE 1000/ML
5000 VIAL (ML) INJECTION EVERY 8 HOURS
Refills: 0 | Status: DISCONTINUED | OUTPATIENT
Start: 2024-12-04 | End: 2024-12-15

## 2024-12-04 RX ORDER — ACETAMINOPHEN 500MG 500 MG/1
650 TABLET, COATED ORAL EVERY 6 HOURS
Refills: 0 | Status: DISCONTINUED | OUTPATIENT
Start: 2024-12-04 | End: 2024-12-20

## 2024-12-04 RX ORDER — INSULIN GLARGINE 100 [IU]/ML
26 INJECTION, SOLUTION SUBCUTANEOUS AT BEDTIME
Refills: 0 | Status: DISCONTINUED | OUTPATIENT
Start: 2024-12-04 | End: 2024-12-20

## 2024-12-04 RX ORDER — CLOPIDOGREL 75 MG/1
75 TABLET, FILM COATED ORAL DAILY
Refills: 0 | Status: DISCONTINUED | OUTPATIENT
Start: 2024-12-04 | End: 2024-12-20

## 2024-12-04 RX ORDER — AMLODIPINE BESYLATE 10 MG/1
10 TABLET ORAL DAILY
Refills: 0 | Status: DISCONTINUED | OUTPATIENT
Start: 2024-12-04 | End: 2024-12-20

## 2024-12-04 RX ORDER — GABAPENTIN 300 MG/1
300 CAPSULE ORAL AT BEDTIME
Refills: 0 | Status: DISCONTINUED | OUTPATIENT
Start: 2024-12-04 | End: 2024-12-20

## 2024-12-04 RX ORDER — INSULIN GLARGINE 100 [IU]/ML
30 INJECTION, SOLUTION SUBCUTANEOUS AT BEDTIME
Refills: 0 | Status: DISCONTINUED | OUTPATIENT
Start: 2024-12-04 | End: 2024-12-04

## 2024-12-04 RX ORDER — SACUBITRIL AND VALSARTAN 24; 26 MG/1; MG/1
1 TABLET, FILM COATED ORAL
Refills: 0 | Status: DISCONTINUED | OUTPATIENT
Start: 2024-12-04 | End: 2024-12-04

## 2024-12-04 RX ORDER — ISOSORBIDE MONONITRATE 10 MG
60 TABLET ORAL DAILY
Refills: 0 | Status: DISCONTINUED | OUTPATIENT
Start: 2024-12-04 | End: 2024-12-20

## 2024-12-04 RX ORDER — ONDANSETRON HYDROCHLORIDE 4 MG/1
4 TABLET, FILM COATED ORAL EVERY 8 HOURS
Refills: 0 | Status: DISCONTINUED | OUTPATIENT
Start: 2024-12-04 | End: 2024-12-20

## 2024-12-04 RX ORDER — RANOLAZINE 1000 MG/1
500 TABLET, FILM COATED, EXTENDED RELEASE ORAL
Refills: 0 | Status: DISCONTINUED | OUTPATIENT
Start: 2024-12-04 | End: 2024-12-20

## 2024-12-04 RX ORDER — MAGNESIUM, ALUMINUM HYDROXIDE 200-225/5
30 SUSPENSION, ORAL (FINAL DOSE FORM) ORAL EVERY 4 HOURS
Refills: 0 | Status: DISCONTINUED | OUTPATIENT
Start: 2024-12-04 | End: 2024-12-20

## 2024-12-04 RX ORDER — 0.9 % SODIUM CHLORIDE 0.9 %
1000 INTRAVENOUS SOLUTION INTRAVENOUS
Refills: 0 | Status: DISCONTINUED | OUTPATIENT
Start: 2024-12-04 | End: 2024-12-20

## 2024-12-04 RX ORDER — METOPROLOL TARTRATE 100 MG/1
100 TABLET, FILM COATED ORAL DAILY
Refills: 0 | Status: DISCONTINUED | OUTPATIENT
Start: 2024-12-04 | End: 2024-12-20

## 2024-12-04 RX ADMIN — Medication 80 MILLIGRAM(S): at 22:23

## 2024-12-04 RX ADMIN — ACETAMINOPHEN 500MG 650 MILLIGRAM(S): 500 TABLET, COATED ORAL at 22:23

## 2024-12-04 RX ADMIN — ACETAMINOPHEN 500MG 650 MILLIGRAM(S): 500 TABLET, COATED ORAL at 06:33

## 2024-12-04 RX ADMIN — Medication 1: at 08:37

## 2024-12-04 RX ADMIN — ACETAMINOPHEN 500MG 650 MILLIGRAM(S): 500 TABLET, COATED ORAL at 15:13

## 2024-12-04 RX ADMIN — RANOLAZINE 500 MILLIGRAM(S): 1000 TABLET, FILM COATED, EXTENDED RELEASE ORAL at 06:02

## 2024-12-04 RX ADMIN — INSULIN GLARGINE 26 UNIT(S): 100 INJECTION, SOLUTION SUBCUTANEOUS at 22:24

## 2024-12-04 RX ADMIN — ACETAMINOPHEN 500MG 650 MILLIGRAM(S): 500 TABLET, COATED ORAL at 23:23

## 2024-12-04 RX ADMIN — Medication 5000 UNIT(S): at 06:02

## 2024-12-04 RX ADMIN — Medication 81 MILLIGRAM(S): at 10:38

## 2024-12-04 RX ADMIN — FUROSEMIDE 40 MILLIGRAM(S): 40 TABLET ORAL at 06:04

## 2024-12-04 RX ADMIN — Medication 3: at 12:11

## 2024-12-04 RX ADMIN — CLOPIDOGREL 75 MILLIGRAM(S): 75 TABLET, FILM COATED ORAL at 10:38

## 2024-12-04 RX ADMIN — RANOLAZINE 500 MILLIGRAM(S): 1000 TABLET, FILM COATED, EXTENDED RELEASE ORAL at 17:24

## 2024-12-04 RX ADMIN — Medication 2: at 22:27

## 2024-12-04 RX ADMIN — SACUBITRIL AND VALSARTAN 1 TABLET(S): 24; 26 TABLET, FILM COATED ORAL at 06:02

## 2024-12-04 RX ADMIN — Medication 5000 UNIT(S): at 22:23

## 2024-12-04 RX ADMIN — Medication 60 MILLIGRAM(S): at 10:37

## 2024-12-04 RX ADMIN — METOPROLOL TARTRATE 100 MILLIGRAM(S): 100 TABLET, FILM COATED ORAL at 06:03

## 2024-12-04 RX ADMIN — Medication 3: at 17:23

## 2024-12-04 RX ADMIN — GABAPENTIN 300 MILLIGRAM(S): 300 CAPSULE ORAL at 22:23

## 2024-12-04 RX ADMIN — Medication 5000 UNIT(S): at 13:29

## 2024-12-04 RX ADMIN — AMLODIPINE BESYLATE 10 MILLIGRAM(S): 10 TABLET ORAL at 06:03

## 2024-12-04 RX ADMIN — FUROSEMIDE 40 MILLIGRAM(S): 40 TABLET ORAL at 13:38

## 2024-12-04 NOTE — PROGRESS NOTE ADULT - ASSESSMENT
74 F with PMHX CAD s/p PCI, HFrEF, Mobitz II s/p PPM (MIRNA Dawn), PAD with chronic right LE wound, CVA, DM2, CKD3, HTN presents to [facility] ER c/o shortness of breath/RAMIREZ and worsening LE edema admitted for Acute on Chronic HFrEF Exacerbation and Hyperkalemia.    1-Acute on Chronic HFrEF   2- chest pain   -Trend Cardiac Enzymes  -cardiology consulted   cont Lasix 40mg IV BID  -TTE ordered  -Strict I&Os  -Daily Weights     2-Hyperkalemia  -K 6.3 hemolyzed. Repeat K 5.5 Lasix 40mg IV   repeat lytes normal K     3-CKD stage 3b   -Cr 1.46 close to baseline Cr 1.43  -Permissive Azotemia with diuretics   -Avoid Nephrotoxins  -Trend lytes     4-CAD s/p PCI,   Mobitz Type 2 s/p MicraPPM  -Continue DAPT , asa imdur all cardiac meds     5- Diabetes Mellitus type2 on insulin with neuropathy   -Lantus 26 units qHS  -DC metformin with CKD   -ISS/Accuechecks/A1C  -Gabapentin 300mg qHS    VTE PPX: SCDs/SQH 5000q8    DISPO: Acute.   Plan for dispo home when ready liekly few days

## 2024-12-04 NOTE — PATIENT PROFILE ADULT - FUNCTIONAL ASSESSMENT - DAILY ACTIVITY 5.
[address] [facility]  PHYSICAL THERAPY  [] EVALUATION  [x] DAILY NOTE (LAND) [] DAILY NOTE (AQUATIC ) [] PROGRESS NOTE [] DISCHARGE NOTE    [x] OUTPATIENT REHABILITATION CENTER [facility]   [] [place] [address]    [] [facility]   [] Tonya Dela Cruzwig    Date: 10/15/2020  Patient Name:  Ronan Cuello  : 1957  MRN: [id number]    Referring Practitioner LEIGHA Farley - *   Diagnosis Cervicalgia [M54.2]    Treatment Diagnosis Neck and left shoulder pain    Date of Evaluation 10/5/20    Additional Pertinent History Fibromyalgia, post open heart surgery 6 years ago,       Functional Outcome Measure Used  Oswestry NDI     Functional Outcome Score     13 (10/5/20)       Insurance: Primary: Payor: Srini Gabriel /  /  / ,   Secondary:    Authorization Information: $40 copay per visit    Visit # 3/3/10 for progress note   Visits Allowed:    Recertification Date: 8 weeks,(2020)   Physician Follow-Up: Unknown    Physician Orders:    History of Present Illness: Pain since April or May after doing yardwork. Digging up bricks and carrying themin the left arm and readjusted -- moderate increased pain in the left neck and a to elbow and chest. She has had chest pain since heart surgery about 6 years ago. SUBJECTIVE:  Pain after last visit when got home. Used Flexerol yesterday   Made very tired-- took a whole day for pain to go away. Still feeling like her neck is bruised  In the left upper cervical region. TREATMENT   Precautions:    Pain: 2/10  Left neck       X in shaded column indicates activity completed today   Modalities Parameters/  Location  Notes   Cold pack 8 min after   Left side of neck   x                Manual Therapy Time/Technique  Notes   SO release, manual CT   No prone massage  8 min   x Relief in headache and tightness after SO release.                 Exercise/Intervention   Notes   T band ret, ext abd, ER, IR, flex , scap squeeze, biceps Orange 20 reps fatigue after   X                                                                             Specific Interventions Next Treatment: SO release, stretching, add scap strengthening,  Cont with  Gentle supine stretching. Activity/Treatment Tolerance:  [x]  Patient tolerated treatment well  []  Patient limited by fatigue  []  Patient limited by pain   []  Patient limited by medical complications  [x]  Other:     Assessment:less pain overall still avoiding pulling with her left arm    Body Structures/Functions/Activity Limitations: impaired muscle tone, impaired ROM, impaired strength, pain and decreased posture  Prognosis: good    GOALS:  Patient Goal: less pain , turn head better      Short Term Goals:  Time Frame: 4 weeks  Increase CROM to 75% of normal  Increase scap strength to 4+ and left shoulder strength to4/5  Decrease pain to less than 5/10 when present      Long Term Goals:  Time Frame: 8 weeks  Increase CROM to [facility] without increased pain to eric a car without compensation  Increase B shoulder and scap strength to 5/5 so patient can reach overhead without increased neck pain   I HEP      Patient Education:   [x]  HEP/Education Completed: Plan of Care, Goals, shoulder rolls.  Medbridge Access Code:  []  No new Education completed  []  Reviewed Prior HEP      []  Patient verbalized and/or demonstrated understanding of education provided. []  Patient unable to verbalize and/or demonstrate understanding of education provided. Will continue education. [x]  Barriers to learning: none    PLAN:  Treatment Recommendations: Strengthening, Range of Motion, Manual Therapy - Soft Tissue Mobilization, Manual Therapy - Joint Manipulation, Pain Management, Home Exercise Program, Patient Education and Modalities    []  Plan of care initiated. Plan to see patient 2 times per week for 8 weeks to address the treatment planned outlined above.   [x]  Continue with current plan of care  []  Modify plan of care as follows:    []  Hold pending physician visit  []  Discharge    Time In 0935   Time out  1006   Timed Code Minutes: 31   Total Treatment Time: 31       Electronically Signed by: Diamond Oquendo 4 = No assist / stand by assistance

## 2024-12-04 NOTE — PROGRESS NOTE ADULT - SUBJECTIVE AND OBJECTIVE BOX
Patient is a 74y old  Female who presents with a chief complaint of Acute on Chronic HFrEF Exacerbation (03 Dec 2024 23:51)      Chart reviewed     ALLERGIES:  No Known Allergies    MEDICATIONS  (STANDING):  amLODIPine   Tablet 10 milliGRAM(s) Oral daily  aspirin enteric coated 81 milliGRAM(s) Oral daily  atorvastatin 80 milliGRAM(s) Oral at bedtime  clopidogrel Tablet 75 milliGRAM(s) Oral daily  dextrose 5%. 1000 milliLiter(s) (50 mL/Hr) IV Continuous <Continuous>  dextrose 5%. 1000 milliLiter(s) (100 mL/Hr) IV Continuous <Continuous>  dextrose 50% Injectable 25 Gram(s) IV Push once  dextrose 50% Injectable 12.5 Gram(s) IV Push once  dextrose 50% Injectable 25 Gram(s) IV Push once  furosemide   Injectable 40 milliGRAM(s) IV Push two times a day  gabapentin 300 milliGRAM(s) Oral at bedtime  glucagon  Injectable 1 milliGRAM(s) IntraMuscular once  heparin   Injectable 5000 Unit(s) SubCutaneous every 8 hours  insulin glargine Injectable (LANTUS) 26 Unit(s) SubCutaneous at bedtime  insulin lispro (ADMELOG) corrective regimen sliding scale   SubCutaneous Before meals and at bedtime  isosorbide   mononitrate ER Tablet (IMDUR) 60 milliGRAM(s) Oral daily  metoprolol succinate  milliGRAM(s) Oral daily  ranolazine 500 milliGRAM(s) Oral two times a day  sacubitril 24 mG/valsartan 26 mG 1 Tablet(s) Oral two times a day    MEDICATIONS  (PRN):  acetaminophen     Tablet .. 650 milliGRAM(s) Oral every 6 hours PRN Temp greater or equal to 38C (100.4F), Mild Pain (1 - 3)  aluminum hydroxide/magnesium hydroxide/simethicone Suspension 30 milliLiter(s) Oral every 4 hours PRN Dyspepsia  dextrose Oral Gel 15 Gram(s) Oral once PRN Blood Glucose LESS THAN 70 milliGRAM(s)/deciliter  melatonin 3 milliGRAM(s) Oral at bedtime PRN Insomnia  ondansetron Injectable 4 milliGRAM(s) IV Push every 8 hours PRN Nausea and/or Vomiting    Vital Signs Last 24 Hrs  T(F): 98 (04 Dec 2024 05:30), Max: 98.1 (03 Dec 2024 15:29)  HR: 61 (04 Dec 2024 06:01) (61 - 67)  BP: 144/66 (04 Dec 2024 06:01) (120/64 - 151/72)  RR: 20 (04 Dec 2024 05:30) (16 - 20)  SpO2: 93% (04 Dec 2024 05:30) (90% - 94%)  I&O's Summary      PHYSICAL EXAM:  General:   ENT:   Neck:   Lungs:   Cardio: RRR, S1/S2, No murmur  Abdomen: Soft, Nontender, Nondistended; Bowel sounds present  Extremities: No calf tenderness, No pitting edema    LABS:                        7.8    14.72 )-----------( 397      ( 04 Dec 2024 05:00 )             24.6     12-04    136  |  103  |  36.8  ----------------------------<  188  4.9   |  21.0  |  1.74    Ca    9.2      04 Dec 2024 05:00  Phos  3.0     12-04  Mg     2.1     12-04    TPro  6.9  /  Alb  3.8  /  TBili  0.3  /  DBili  x   /  AST  13  /  ALT  8   /  AlkPhos  68  12-04                      TSH 1.47   TSH with FT4 reflex --  Total T3 --                  Urinalysis Basic - ( 04 Dec 2024 05:00 )    Color: x / Appearance: x / SG: x / pH: x  Gluc: 188 mg/dL / Ketone: x  / Bili: x / Urobili: x   Blood: x / Protein: x / Nitrite: x   Leuk Esterase: x / RBC: x / WBC x   Sq Epi: x / Non Sq Epi: x / Bacteria: x          RADIOLOGY & ADDITIONAL TESTS:       Patient is a 74y old  Female who presents with a chief complaint of Acute on Chronic HFrEF Exacerbation (03 Dec 2024 23:51)      Chart reviewed   pt is seen interviewed with Trinidadian speaking nurse taking care of her     Pt reports feeling SOB CP yesterday prior admission at home   currently denies CP , SON on exertion         ALLERGIES:  No Known Allergies    MEDICATIONS  (STANDING):  amLODIPine   Tablet 10 milliGRAM(s) Oral daily  aspirin enteric coated 81 milliGRAM(s) Oral daily  atorvastatin 80 milliGRAM(s) Oral at bedtime  clopidogrel Tablet 75 milliGRAM(s) Oral daily  dextrose 5%. 1000 milliLiter(s) (50 mL/Hr) IV Continuous <Continuous>  dextrose 5%. 1000 milliLiter(s) (100 mL/Hr) IV Continuous <Continuous>  dextrose 50% Injectable 25 Gram(s) IV Push once  dextrose 50% Injectable 12.5 Gram(s) IV Push once  dextrose 50% Injectable 25 Gram(s) IV Push once  furosemide   Injectable 40 milliGRAM(s) IV Push two times a day  gabapentin 300 milliGRAM(s) Oral at bedtime  glucagon  Injectable 1 milliGRAM(s) IntraMuscular once  heparin   Injectable 5000 Unit(s) SubCutaneous every 8 hours  insulin glargine Injectable (LANTUS) 26 Unit(s) SubCutaneous at bedtime  insulin lispro (ADMELOG) corrective regimen sliding scale   SubCutaneous Before meals and at bedtime  isosorbide   mononitrate ER Tablet (IMDUR) 60 milliGRAM(s) Oral daily  metoprolol succinate  milliGRAM(s) Oral daily  ranolazine 500 milliGRAM(s) Oral two times a day  sacubitril 24 mG/valsartan 26 mG 1 Tablet(s) Oral two times a day    MEDICATIONS  (PRN):  acetaminophen     Tablet .. 650 milliGRAM(s) Oral every 6 hours PRN Temp greater or equal to 38C (100.4F), Mild Pain (1 - 3)  aluminum hydroxide/magnesium hydroxide/simethicone Suspension 30 milliLiter(s) Oral every 4 hours PRN Dyspepsia  dextrose Oral Gel 15 Gram(s) Oral once PRN Blood Glucose LESS THAN 70 milliGRAM(s)/deciliter  melatonin 3 milliGRAM(s) Oral at bedtime PRN Insomnia  ondansetron Injectable 4 milliGRAM(s) IV Push every 8 hours PRN Nausea and/or Vomiting    Vital Signs Last 24 Hrs  T(F): 98 (04 Dec 2024 05:30), Max: 98.1 (03 Dec 2024 15:29)  HR: 61 (04 Dec 2024 06:01) (61 - 67)  BP: 144/66 (04 Dec 2024 06:01) (120/64 - 151/72)  RR: 20 (04 Dec 2024 05:30) (16 - 20)  SpO2: 93% (04 Dec 2024 05:30) (90% - 94%)  I&O's Summary      PHYSICAL EXAM:  General: no distress  Neck: supple   Lungs: CTA   Cardio: RRR, S1/S2, No murmur  Abdomen: Soft, Nontender, Nondistended; Bowel sounds present  Extremities: No calf tenderness, No pitting edema  Skin pale color     LABS:                        7.8    14.72 )-----------( 397      ( 04 Dec 2024 05:00 )             24.6     12-04    136  |  103  |  36.8  ----------------------------<  188  4.9   |  21.0  |  1.74    Ca    9.2      04 Dec 2024 05:00  Phos  3.0     12-04  Mg     2.1     12-04    TPro  6.9  /  Alb  3.8  /  TBili  0.3  /  DBili  x   /  AST  13  /  ALT  8   /  AlkPhos  68  12-04                      TSH 1.47   TSH with FT4 reflex --  Total T3 --                  Urinalysis Basic - ( 04 Dec 2024 05:00 )    Color: x / Appearance: x / SG: x / pH: x  Gluc: 188 mg/dL / Ketone: x  / Bili: x / Urobili: x   Blood: x / Protein: x / Nitrite: x   Leuk Esterase: x / RBC: x / WBC x   Sq Epi: x / Non Sq Epi: x / Bacteria: x          RADIOLOGY & ADDITIONAL TESTS:

## 2024-12-04 NOTE — CHART NOTE - NSCHARTNOTEFT_GEN_A_CORE
RN called for pt who was in the bathroom and oxygen tank fell on her right foot. Pt c/o pain at the site, states it is very hard to move. Is able to move foot, performs flexion and extension. Will order Xray to r/o fx. MD aware. RN called for pt who was in the bathroom and oxygen tank fell on her right foot. Pt c/o pain at the site, states it is very hard to move. Is able to move foot, performs flexion and extension. 5cm bruise seen on anterior lateral side of foot. Will order Xray to r/o fx. MD aware.

## 2024-12-05 ENCOUNTER — RESULT REVIEW (OUTPATIENT)
Age: 74
End: 2024-12-05

## 2024-12-05 DIAGNOSIS — Z95.810 PRESENCE OF AUTOMATIC (IMPLANTABLE) CARDIAC DEFIBRILLATOR: ICD-10-CM

## 2024-12-05 LAB
ANION GAP SERPL CALC-SCNC: 11 MMOL/L — SIGNIFICANT CHANGE UP (ref 5–17)
BASOPHILS # BLD AUTO: 0.04 K/UL — SIGNIFICANT CHANGE UP (ref 0–0.2)
BASOPHILS NFR BLD AUTO: 0.4 % — SIGNIFICANT CHANGE UP (ref 0–2)
BLD GP AB SCN SERPL QL: SIGNIFICANT CHANGE UP
BUN SERPL-MCNC: 35.2 MG/DL — HIGH (ref 8–20)
CALCIUM SERPL-MCNC: 8.4 MG/DL — SIGNIFICANT CHANGE UP (ref 8.4–10.5)
CHLORIDE SERPL-SCNC: 102 MMOL/L — SIGNIFICANT CHANGE UP (ref 96–108)
CO2 SERPL-SCNC: 23 MMOL/L — SIGNIFICANT CHANGE UP (ref 22–29)
CREAT SERPL-MCNC: 1.72 MG/DL — HIGH (ref 0.5–1.3)
EGFR: 31 ML/MIN/1.73M2 — LOW
EOSINOPHIL # BLD AUTO: 0.38 K/UL — SIGNIFICANT CHANGE UP (ref 0–0.5)
EOSINOPHIL NFR BLD AUTO: 3.6 % — SIGNIFICANT CHANGE UP (ref 0–6)
FERRITIN SERPL-MCNC: 119 NG/ML — SIGNIFICANT CHANGE UP (ref 13–330)
GLUCOSE BLDC GLUCOMTR-MCNC: 158 MG/DL — HIGH (ref 70–99)
GLUCOSE BLDC GLUCOMTR-MCNC: 160 MG/DL — HIGH (ref 70–99)
GLUCOSE BLDC GLUCOMTR-MCNC: 166 MG/DL — HIGH (ref 70–99)
GLUCOSE BLDC GLUCOMTR-MCNC: 213 MG/DL — HIGH (ref 70–99)
GLUCOSE SERPL-MCNC: 141 MG/DL — HIGH (ref 70–99)
HCT VFR BLD CALC: 22.1 % — LOW (ref 34.5–45)
HGB BLD-MCNC: 7.1 G/DL — LOW (ref 11.5–15.5)
IMM GRANULOCYTES NFR BLD AUTO: 0.7 % — SIGNIFICANT CHANGE UP (ref 0–0.9)
LYMPHOCYTES # BLD AUTO: 2.36 K/UL — SIGNIFICANT CHANGE UP (ref 1–3.3)
LYMPHOCYTES # BLD AUTO: 22.1 % — SIGNIFICANT CHANGE UP (ref 13–44)
MCHC RBC-ENTMCNC: 26.7 PG — LOW (ref 27–34)
MCHC RBC-ENTMCNC: 32.1 G/DL — SIGNIFICANT CHANGE UP (ref 32–36)
MCV RBC AUTO: 83.1 FL — SIGNIFICANT CHANGE UP (ref 80–100)
MONOCYTES # BLD AUTO: 0.85 K/UL — SIGNIFICANT CHANGE UP (ref 0–0.9)
MONOCYTES NFR BLD AUTO: 8 % — SIGNIFICANT CHANGE UP (ref 2–14)
NEUTROPHILS # BLD AUTO: 6.98 K/UL — SIGNIFICANT CHANGE UP (ref 1.8–7.4)
NEUTROPHILS NFR BLD AUTO: 65.2 % — SIGNIFICANT CHANGE UP (ref 43–77)
PLATELET # BLD AUTO: 340 K/UL — SIGNIFICANT CHANGE UP (ref 150–400)
POTASSIUM SERPL-MCNC: 4.8 MMOL/L — SIGNIFICANT CHANGE UP (ref 3.5–5.3)
POTASSIUM SERPL-SCNC: 4.8 MMOL/L — SIGNIFICANT CHANGE UP (ref 3.5–5.3)
RBC # BLD: 2.66 M/UL — LOW (ref 3.8–5.2)
RBC # FLD: 18.6 % — HIGH (ref 10.3–14.5)
SODIUM SERPL-SCNC: 136 MMOL/L — SIGNIFICANT CHANGE UP (ref 135–145)
WBC # BLD: 10.69 K/UL — HIGH (ref 3.8–10.5)
WBC # FLD AUTO: 10.69 K/UL — HIGH (ref 3.8–10.5)

## 2024-12-05 PROCEDURE — 93306 TTE W/DOPPLER COMPLETE: CPT | Mod: 26

## 2024-12-05 PROCEDURE — 99233 SBSQ HOSP IP/OBS HIGH 50: CPT

## 2024-12-05 RX ORDER — ERYTHROPOIETIN 3000 [IU]/ML
10000 INJECTION, SOLUTION INTRAVENOUS; SUBCUTANEOUS ONCE
Refills: 0 | Status: DISCONTINUED | OUTPATIENT
Start: 2024-12-05 | End: 2024-12-05

## 2024-12-05 RX ORDER — ERYTHROPOIETIN 3000 [IU]/ML
10000 INJECTION, SOLUTION INTRAVENOUS; SUBCUTANEOUS ONCE
Refills: 0 | Status: COMPLETED | OUTPATIENT
Start: 2024-12-05 | End: 2024-12-05

## 2024-12-05 RX ORDER — IRON SUCROSE 20 MG/ML
200 INJECTION, SOLUTION INTRAVENOUS EVERY 24 HOURS
Refills: 0 | Status: COMPLETED | OUTPATIENT
Start: 2024-12-05 | End: 2024-12-07

## 2024-12-05 RX ADMIN — Medication 1: at 23:13

## 2024-12-05 RX ADMIN — FUROSEMIDE 40 MILLIGRAM(S): 40 TABLET ORAL at 05:33

## 2024-12-05 RX ADMIN — Medication 81 MILLIGRAM(S): at 09:33

## 2024-12-05 RX ADMIN — Medication 2: at 18:04

## 2024-12-05 RX ADMIN — ERYTHROPOIETIN 10000 UNIT(S): 3000 INJECTION, SOLUTION INTRAVENOUS; SUBCUTANEOUS at 18:03

## 2024-12-05 RX ADMIN — Medication 5000 UNIT(S): at 22:10

## 2024-12-05 RX ADMIN — Medication 80 MILLIGRAM(S): at 22:10

## 2024-12-05 RX ADMIN — ACETAMINOPHEN 500MG 650 MILLIGRAM(S): 500 TABLET, COATED ORAL at 12:30

## 2024-12-05 RX ADMIN — RANOLAZINE 500 MILLIGRAM(S): 1000 TABLET, FILM COATED, EXTENDED RELEASE ORAL at 18:04

## 2024-12-05 RX ADMIN — AMLODIPINE BESYLATE 10 MILLIGRAM(S): 10 TABLET ORAL at 05:33

## 2024-12-05 RX ADMIN — CLOPIDOGREL 75 MILLIGRAM(S): 75 TABLET, FILM COATED ORAL at 09:33

## 2024-12-05 RX ADMIN — Medication 60 MILLIGRAM(S): at 09:33

## 2024-12-05 RX ADMIN — Medication 5 UNIT(S): at 18:05

## 2024-12-05 RX ADMIN — Medication 1: at 12:34

## 2024-12-05 RX ADMIN — RANOLAZINE 500 MILLIGRAM(S): 1000 TABLET, FILM COATED, EXTENDED RELEASE ORAL at 05:32

## 2024-12-05 RX ADMIN — Medication 1: at 09:34

## 2024-12-05 RX ADMIN — GABAPENTIN 300 MILLIGRAM(S): 300 CAPSULE ORAL at 22:10

## 2024-12-05 RX ADMIN — IRON SUCROSE 110 MILLIGRAM(S): 20 INJECTION, SOLUTION INTRAVENOUS at 23:11

## 2024-12-05 RX ADMIN — INSULIN GLARGINE 26 UNIT(S): 100 INJECTION, SOLUTION SUBCUTANEOUS at 23:14

## 2024-12-05 RX ADMIN — ACETAMINOPHEN 500MG 650 MILLIGRAM(S): 500 TABLET, COATED ORAL at 13:00

## 2024-12-05 RX ADMIN — Medication 5000 UNIT(S): at 05:33

## 2024-12-05 RX ADMIN — Medication 5000 UNIT(S): at 15:10

## 2024-12-05 RX ADMIN — FUROSEMIDE 40 MILLIGRAM(S): 40 TABLET ORAL at 15:11

## 2024-12-05 RX ADMIN — METOPROLOL TARTRATE 100 MILLIGRAM(S): 100 TABLET, FILM COATED ORAL at 05:33

## 2024-12-05 NOTE — CONSULT NOTE ADULT - SUBJECTIVE AND OBJECTIVE BOX
HISTORY OF PRESENT ILLNESS: 74F with PMHX CAD s/p PCI, HFrEF, Mobitz II s/p PPM (Nereyda, MDT), PAD with chronic right LE wound, CVA, DM2, CKD3, HTN presents to [facility] ER c/o shortness of breath/RAMIREZ and worsening LE edema. ROS +nonradiating CP and nonproductive cough. Pt recently admitted for decompensated CHF and was doing well until she noted increased weight gain and called her Cardiologist Dr Allen who advised her to come to ER for further evaluation. Pt normally takes Lasix 20mg daily but took 40mg today PTA with improvement. CXR with congestive findings and mild effusions bilaterally. Crackles and pitting edema on exam. Labs notable for leukocytosis, CKD, and hyperkalemia.   GI consulted for further evaluation of reported dark color stool. Patient reported dark color stool for >1y year "because I takes Iron pills".  She denies hematemesis, hematochezia, GI bleeding in the past. She reported heartburn in the past 4 days, pain worse after drinking water. Denies acid reflux. Her last EGD/ colonoscopy was 8 years ago and patient unable to recall the report.  On examination, NAN showed dark green color stool. Her hemoglobin was 7.1 gm this morning, baseline 9.8 gm.    ID [id number] used.     Review of Systems:  . Constitutional: No fever, chills  . HEENT: Negative  · Respiratory and Thorax: No shortness of breath, no cough  · Cardiovascular: No chest pain, palpitation, no dizziness  · Gastrointestinal: see above  · Genitourinary: No hematuria  · Musculoskeletal: Negative  · Neurological: negative  · Psychiatric: no agitation, no anxiety      PAST MEDICAL/SURGICAL HISTORY:  DM (diabetes mellitus)    HTN (hypertension)    H/O gastroesophageal reflux (GERD)    Cardiac pacemaker  MICRA for mobitz type 11    CVA (cerebrovascular accident)  resdiual right sided weakness    CVA (cerebrovascular accident)    PAD (peripheral artery disease)    Wound of right leg    History of benign brain tumor    Cataract    History of biopsy    Cardiac pacemaker    S/P angiogram of extremity      SOCIAL HISTORY:  - TOBACCO: Denies  - ALCOHOL: Denies  - ILLICIT DRUG USE: Denies    FAMILY HISTORY:  No known history of gastrointestinal or liver disease;  FH: asthma  Son        HOME MEDICATIONS:  amLODIPine 10 mg oral tablet: 1 tab(s) orally once a day (04 Dec 2024 01:16)  ferrous sulfate 325 mg (65 mg elemental iron) oral tablet: 1 tab(s) orally 3 times a day (04 Dec 2024 01:16)  gabapentin 300 mg oral capsule: 1 cap(s) orally once a day (at bedtime) (04 Dec 2024 01:16)  insulin glargine 100 units/mL subcutaneous solution: 26 unit(s) subcutaneous once a day (at bedtime) (04 Dec 2024 01:48)  isosorbide mononitrate 60 mg oral tablet, extended release: 1 tab(s) orally once a day (04 Dec 2024 01:16)  metoprolol succinate 100 mg oral tablet, extended release: 1 tab(s) orally once a day (04 Dec 2024 01:16)  ranolazine 500 mg oral tablet, extended release: 1 tab(s) orally 2 times a day (04 Dec 2024 01:16)    INPATIENT MEDICATIONS:  MEDICATIONS  (STANDING):  amLODIPine   Tablet 10 milliGRAM(s) Oral daily  aspirin enteric coated 81 milliGRAM(s) Oral daily  atorvastatin 80 milliGRAM(s) Oral at bedtime  clopidogrel Tablet 75 milliGRAM(s) Oral daily  dextrose 5%. 1000 milliLiter(s) (50 mL/Hr) IV Continuous <Continuous>  dextrose 5%. 1000 milliLiter(s) (100 mL/Hr) IV Continuous <Continuous>  dextrose 50% Injectable 25 Gram(s) IV Push once  dextrose 50% Injectable 12.5 Gram(s) IV Push once  dextrose 50% Injectable 25 Gram(s) IV Push once  epoetin kristina-epbx (RETACRIT) Injectable 89288 Unit(s) SubCutaneous once  furosemide   Injectable 40 milliGRAM(s) IV Push two times a day  gabapentin 300 milliGRAM(s) Oral at bedtime  glucagon  Injectable 1 milliGRAM(s) IntraMuscular once  heparin   Injectable 5000 Unit(s) SubCutaneous every 8 hours  insulin glargine Injectable (LANTUS) 26 Unit(s) SubCutaneous at bedtime  insulin lispro (ADMELOG) corrective regimen sliding scale   SubCutaneous Before meals and at bedtime  insulin lispro Injectable (ADMELOG) 5 Unit(s) SubCutaneous three times a day before meals  iron sucrose IVPB 200 milliGRAM(s) IV Intermittent every 24 hours  isosorbide   mononitrate ER Tablet (IMDUR) 60 milliGRAM(s) Oral daily  metoprolol succinate  milliGRAM(s) Oral daily  ranolazine 500 milliGRAM(s) Oral two times a day    MEDICATIONS  (PRN):  acetaminophen     Tablet .. 650 milliGRAM(s) Oral every 6 hours PRN Temp greater or equal to 38C (100.4F), Mild Pain (1 - 3)  aluminum hydroxide/magnesium hydroxide/simethicone Suspension 30 milliLiter(s) Oral every 4 hours PRN Dyspepsia  dextrose Oral Gel 15 Gram(s) Oral once PRN Blood Glucose LESS THAN 70 milliGRAM(s)/deciliter  melatonin 3 milliGRAM(s) Oral at bedtime PRN Insomnia  ondansetron Injectable 4 milliGRAM(s) IV Push every 8 hours PRN Nausea and/or Vomiting    ALLERGIES:  No Known Allergies    T(C): 36.7 (12-05-24 @ 09:01), Max: 37.2 (12-04-24 @ 15:46)  HR: 62 (12-05-24 @ 09:01) (62 - 66)  BP: 117/59 (12-05-24 @ 09:01) (117/59 - 147/72)  RR: 20 (12-05-24 @ 09:01) (18 - 20)  SpO2: 98% (12-05-24 @ 09:01) (94% - 99%)    12-04-24 @ 07:01  -  12-05-24 @ 07:00  --------------------------------------------------------  IN: 462 mL / OUT: 750 mL / NET: -288 mL    12-05-24 @ 07:01  -  12-05-24 @ 14:59  --------------------------------------------------------  IN: 210 mL / OUT: 1450 mL / NET: -1240 mL        PHYSICAL EXAM:    Constitutional: No acute distress  Neuro: Awake alert, oriented  HEENT: anicteric sclerae  CV: regular rate, regular rhythm  Pulm/chest: lung sounds diminished bilaterally, no accessory muscle use noted  Abd: soft, nontender, nondistended +bowel sounds. No rigidity, rebound tenderness, or guarding  Rectal exam: Dark green stool on glove   Ext: RLE edema  Skin: no jaundice   Psych: calm, cooperative    LABS:             7.1    10.69 )-----------( 340      ( 12-05 @ 08:47 )             22.1                7.5    x     )-----------( x        ( 12-04 @ 16:15 )             23.0                7.8    14.72 )-----------( 397      ( 12-04 @ 05:00 )             24.6                9.8    15.96 )-----------( 480      ( 12-03 @ 17:00 )             31.0         12-05    136  |  102  |  35.2[H]  ----------------------------<  141[H]  4.8   |  23.0  |  1.72[H]    Ca    8.4      05 Dec 2024 08:47  Phos  3.0     12-04  Mg     2.1     12-04    TPro  6.9  /  Alb  3.8  /  TBili  0.3[L]  /  DBili  x   /  AST  13  /  ALT  8   /  AlkPhos  68  12-04    LIVER FUNCTIONS - ( 04 Dec 2024 05:00 )  Alb: 3.8 g/dL / Pro: 6.9 g/dL / ALK PHOS: 68 U/L / ALT: 8 U/L / AST: 13 U/L / GGT: x               Ferritin: 119 ng/mL (12-05-24 @ 08:47)  Iron - Total Binding Capacity.: 285 ug/dL (12-04-24 @ 08:54)  % Saturation, Iron: 4 % *L* (12-04-24 @ 08:54)  Iron Total: 10 ug/dL *L* (12-04-24 @ 08:54)  Iron Total: 10 ug/dL *L* (12-04-24 @ 08:54)    Pro-Brain Natriuretic Peptide: pg/mL (12.03.24 @ 17:00)      Urinalysis Basic - ( 05 Dec 2024 08:47 )    Color: x / Appearance: x / SG: x / pH: x  Gluc: 141 mg/dL / Ketone: x  / Bili: x / Urobili: x   Blood: x / Protein: x / Nitrite: x   Leuk Esterase: x / RBC: x / WBC x   Sq Epi: x / Non Sq Epi: x / Bacteria: x

## 2024-12-05 NOTE — PROGRESS NOTE ADULT - PROBLEM SELECTOR PLAN 4
.  -repeat Echo pending to reassess LV EF, if remains <50% consider EP eval. for CRT upgrade given chronic RV pacing 100%

## 2024-12-05 NOTE — PROGRESS NOTE ADULT - SUBJECTIVE AND OBJECTIVE BOX
HOSPITALIST PROGRESS NOTE    SHIREEN CASH  [id number]  74yFemale    Patient is a 74y old  Female who presents with a chief complaint of Acute on Chronic HFrEF Exacerbation (05 Dec 2024 10:54)      SUBJECTIVE:   Chart reviewed since last visit.   Patient seen and examined at bedside for CHF, anemia, BRADY  feels better - no dyspnea at rest, chest pain or palpitations  Denies any nausea, vomiting, abdominal pain. Admits to black stool for 2 months, attributes to vitamins      OBJECTIVE:  Vital Signs Last 24 Hrs  T(C): 36.7 (05 Dec 2024 09:01), Max: 37.2 (04 Dec 2024 15:46)  T(F): 98 (05 Dec 2024 09:01), Max: 98.9 (04 Dec 2024 15:46)  HR: 62 (05 Dec 2024 09:01) (62 - 66)  BP: 117/59 (05 Dec 2024 09:01) (117/59 - 147/72)  BP(mean): 79 (05 Dec 2024 09:01) (79 - 97)  RR: 20 (05 Dec 2024 09:01) (18 - 20)  SpO2: 98% (05 Dec 2024 09:01) (94% - 99%)    Parameters below as of 05 Dec 2024 09:01  Patient On (Oxygen Delivery Method): nasal cannula  O2 Flow (L/min): 2      PHYSICAL EXAMINATION  General: Elderly female sitting up in bed, spouse at bedside  HEENT:  2LNC, conjunctival pallor  NECK:  Supple  CVS: regular rate and rhythm S1 S2  RESP:  Fair air entry bilaterally  GI:  Soft nondistended nontender BS+  : No suprapubic tenderness  MSK:  LE without any edema. Right dorsum with swelling and dressing  CNS:  Awake, alert, fluent speech and intact cognition  INTEG:  Warm skin  PSYCH:  Fair mood    MONITOR:  CAPILLARY BLOOD GLUCOSE      POCT Blood Glucose.: 158 mg/dL (05 Dec 2024 12:29)  POCT Blood Glucose.: 160 mg/dL (05 Dec 2024 08:47)  POCT Blood Glucose.: 243 mg/dL (04 Dec 2024 22:21)  POCT Blood Glucose.: 266 mg/dL (04 Dec 2024 17:15)        I&O's Summary    04 Dec 2024 07:01  -  05 Dec 2024 07:00  --------------------------------------------------------  IN: 462 mL / OUT: 750 mL / NET: -288 mL    05 Dec 2024 07:01  -  05 Dec 2024 12:54  --------------------------------------------------------  IN: 0 mL / OUT: 1000 mL / NET: -1000 mL                            7.1    10.69 )-----------( 340      ( 05 Dec 2024 08:47 )             22.1       Ferritin: 119 ng/mL (12.05.24 @ 08:47) 12-05  Iron Total: 10 ug/dL (12.04.24 @ 08:54)   % Saturation, Iron: 4 % (12.04.24 @ 08:54)     136  |  102  |  35.2[H]  ----------------------------<  141[H]  4.8   |  23.0  |  1.72[H]    Ca    8.4      05 Dec 2024 08:47  Phos  3.0     12-04  Mg     2.1     12-04    TPro  6.9  /  Alb  3.8  /  TBili  0.3[L]  /  DBili  x   /  AST  13  /  ALT  8   /  AlkPhos  68  12-04        Urinalysis Basic - ( 05 Dec 2024 08:47 )    Color: x / Appearance: x / SG: x / pH: x  Gluc: 141 mg/dL / Ketone: x  / Bili: x / Urobili: x   Blood: x / Protein: x / Nitrite: x   Leuk Esterase: x / RBC: x / WBC x   Sq Epi: x / Non Sq Epi: x / Bacteria: x            TTE:    RADIOLOGY        MEDICATIONS  (STANDING):  amLODIPine   Tablet 10 milliGRAM(s) Oral daily  aspirin enteric coated 81 milliGRAM(s) Oral daily  atorvastatin 80 milliGRAM(s) Oral at bedtime  clopidogrel Tablet 75 milliGRAM(s) Oral daily  dextrose 5%. 1000 milliLiter(s) (50 mL/Hr) IV Continuous <Continuous>  dextrose 5%. 1000 milliLiter(s) (100 mL/Hr) IV Continuous <Continuous>  dextrose 50% Injectable 25 Gram(s) IV Push once  dextrose 50% Injectable 12.5 Gram(s) IV Push once  dextrose 50% Injectable 25 Gram(s) IV Push once  furosemide   Injectable 40 milliGRAM(s) IV Push two times a day  gabapentin 300 milliGRAM(s) Oral at bedtime  glucagon  Injectable 1 milliGRAM(s) IntraMuscular once  heparin   Injectable 5000 Unit(s) SubCutaneous every 8 hours  insulin glargine Injectable (LANTUS) 26 Unit(s) SubCutaneous at bedtime  insulin lispro (ADMELOG) corrective regimen sliding scale   SubCutaneous Before meals and at bedtime  isosorbide   mononitrate ER Tablet (IMDUR) 60 milliGRAM(s) Oral daily  metoprolol succinate  milliGRAM(s) Oral daily  ranolazine 500 milliGRAM(s) Oral two times a day      MEDICATIONS  (PRN):  acetaminophen     Tablet .. 650 milliGRAM(s) Oral every 6 hours PRN Temp greater or equal to 38C (100.4F), Mild Pain (1 - 3)  aluminum hydroxide/magnesium hydroxide/simethicone Suspension 30 milliLiter(s) Oral every 4 hours PRN Dyspepsia  dextrose Oral Gel 15 Gram(s) Oral once PRN Blood Glucose LESS THAN 70 milliGRAM(s)/deciliter  melatonin 3 milliGRAM(s) Oral at bedtime PRN Insomnia  ondansetron Injectable 4 milliGRAM(s) IV Push every 8 hours PRN Nausea and/or Vomiting

## 2024-12-05 NOTE — CONSULT NOTE ADULT - NS ATTEND AMEND GEN_ALL_CORE FT
I agree with assessment and plan as above. Pt with HF exacerbation and chronic anemia, noted to have dark stools on po iron supplements. There is no evidence of active overt GI bleeding at this time, although there is an apparent drop in Hgb, this may be secondary to dilution as all cell lines decreased. Would defer endoscopic work up of anemia at this time. Continue medical optimization. Continue Plavix. PPI daily. Consider IV iron supplementation. Trend Hgb and transfuse to Hgb >8 due to CAD.

## 2024-12-05 NOTE — CONSULT NOTE ADULT - ASSESSMENT
74F with PMHX CAD s/p PCI, HFrEF, Mobitz II s/p PPM (MIRNA Dawn), PAD with chronic right LE wound, CVA, DM2, CKD3, HTN admitted with CHF exacerbation.    Dark color stool  Drop in hemoglobin   Hemoglobin 7.1 gm, normocytic, baseline 9.8 gm on 12/03/24. Iron panel c/w anemia of chronic disease. Admitted with CHF exacerbation, elevated proBNP [id number].  NAN showed dark green color stool. Patient takes Iron supplements   - Trend CBC, transfuse as needed to keep hgb 8 or higher.  Monitor for signs of bleeding.   - Protonix 40 mg IV BID  -Avoid NSAIDs  - diet as tolerates  -Okay to continue Plavix as there is no evidence of overt GI bleeding  -No EGD planned at this time.   Plan d/w the patient and family at the bedside    ID [id number] used.

## 2024-12-05 NOTE — PROGRESS NOTE ADULT - PROBLEM SELECTOR PLAN 1
.  - TTE from 10/8/2024 EF 45-50%, RWMA, repeat is pending  - EKG paced  - remains fluid volume overloaded, with crackles in B/L lungs and LE edema  - continue GDMT       Beta Blocker: Toprol XL 100mg PO daily       RAAS Inhibitor: n/a       MRA: n/a       Diuretic: continue lasix 40mg IVP BID       ARNi/SGLT2i: defer Entresto for now given Cr 1.72   - Strict I&O's  - Daily standing weights (if able).  - Keep K > 4, Mg > 2.    - Monitor renal function with ongoing diuresis.

## 2024-12-05 NOTE — PROGRESS NOTE ADULT - ASSESSMENT
74F Sammarinese-speaking ( # [id number]) history significant for HTN, DM2, CKD 3, heart block s/p Micra PPM, CVA, CAD/stent, PAD, CVA, HFmrEF with recurrent hospitalization for HF in 10/8-10/14 and 11/16-11/21/24 underwent repeat LHC on 11/19/24 with critical ISR of RCA stent 99% and 95% OM1 with 95% [id number]-jn-ihdxee LAD no PCI perform yet for optimization of CHF, discharged on Lasix 40mg once daily with low dose Entresto had mild BRADY, interval with worsening dyspnea despite extra Lasix use prompted return to the ER and readmitted for ADHF, pBNP 22K (prior 16K), Cr. 1.7 and Hb 7.8

## 2024-12-05 NOTE — PROGRESS NOTE ADULT - SUBJECTIVE AND OBJECTIVE BOX
[facility]                                                         CARDIOLOGY AT [facility]                                                                  [address] [facility], [place] [place] [address]                                                         Telephone: [phone]. Fax:[phone]                                                                             PROGRESS NOTE    Reason for follow up: CHF, CAD  Update: will need eventual PCI of RCA, needs further optimization of anemia and CHF. was due for OP stress. consider inpatient?      Review of symptoms:   Cardiac:  No chest pain. No dyspnea. No palpitations.  Respiratory: no cough.+dyspnea  Gastrointestinal: No diarrhea. No abdominal pain. No bleeding.   Neuro: No focal neuro complaints.    Vitals:  T(C): 36.7 (24 @ 09:01), Max: 37.2 (24 @ 15:46)  HR: 62 (24 @ 09:01) (62 - 66)  BP: 117/59 (24 @ 09:01) (117/59 - 147/72)  RR: 20 (24 @ 09:01) (18 - 20)  SpO2: 98% (24 @ 09:01) (94% - 99%)  Wt(kg): --  I&O's Summary    04 Dec 2024 07:01  -  05 Dec 2024 07:00  --------------------------------------------------------  IN: 462 mL / OUT: 750 mL / NET: -288 mL      Weight (kg): 68.5 ( @ 15:29)    PHYSICAL EXAM:  Appearance: Comfortable. No acute distress  HEENT:  Atraumatic. Normocephalic.  Normal oral mucosa  Neurologic: A & O x 3, no gross focal deficits.  Cardiovascular: RRR S1 S2, No murmur, no rubs/gallops. No JVD  Respiratory: + diffuse crackles  Gastrointestinal:  Soft, Non-tender, + BS  Lower Extremities: 2+ Peripheral Pulses, No clubbing, cyanosis, + LE edema  Psychiatry: Patient is calm. No agitation.   Skin: warm and dry.    CURRENT CARDIAC MEDICATIONS:  amLODIPine   Tablet 10 milliGRAM(s) Oral daily  furosemide   Injectable 40 milliGRAM(s) IV Push two times a day  isosorbide   mononitrate ER Tablet (IMDUR) 60 milliGRAM(s) Oral daily  metoprolol succinate  milliGRAM(s) Oral daily  ranolazine 500 milliGRAM(s) Oral two times a day      CURRENT OTHER MEDICATIONS:  acetaminophen     Tablet .. 650 milliGRAM(s) Oral every 6 hours PRN Temp greater or equal to 38C (100.4F), Mild Pain (1 - 3)  gabapentin 300 milliGRAM(s) Oral at bedtime  melatonin 3 milliGRAM(s) Oral at bedtime PRN Insomnia  ondansetron Injectable 4 milliGRAM(s) IV Push every 8 hours PRN Nausea and/or Vomiting  aluminum hydroxide/magnesium hydroxide/simethicone Suspension 30 milliLiter(s) Oral every 4 hours PRN Dyspepsia  atorvastatin 80 milliGRAM(s) Oral at bedtime  dextrose 50% Injectable 25 Gram(s) IV Push once, Stop order after: 1 Doses  dextrose 50% Injectable 12.5 Gram(s) IV Push once, Stop order after: 1 Doses  dextrose 50% Injectable 25 Gram(s) IV Push once, Stop order after: 1 Doses  dextrose Oral Gel 15 Gram(s) Oral once, Stop order after: 1 Doses PRN Blood Glucose LESS THAN 70 milliGRAM(s)/deciliter  glucagon  Injectable 1 milliGRAM(s) IntraMuscular once, Stop order after: 1 Doses  insulin glargine Injectable (LANTUS) 26 Unit(s) SubCutaneous at bedtime  insulin lispro (ADMELOG) corrective regimen sliding scale   SubCutaneous Before meals and at bedtime  aspirin enteric coated 81 milliGRAM(s) Oral daily  clopidogrel Tablet 75 milliGRAM(s) Oral daily  dextrose 5%. 1000 milliLiter(s) (50 mL/Hr) IV Continuous <Continuous>  dextrose 5%. 1000 milliLiter(s) (100 mL/Hr) IV Continuous <Continuous>  heparin   Injectable 5000 Unit(s) SubCutaneous every 8 hours      LABS:	 	                            7.1    10.69 )-----------( 340      ( 05 Dec 2024 08:47 )             22.1     12-05    136  |  102  |  35.2[H]  ----------------------------<  141[H]  4.8   |  23.0  |  1.72[H]    Ca    8.4      05 Dec 2024 08:47  Phos  3.0     12-04  Mg     2.1     12-    TPro  6.9  /  Alb  3.8  /  TBili  0.3[L]  /  DBili  x   /  AST  13  /  ALT  8   /  AlkPhos  68  12-      Lipid Profile:   HgA1c:   TSH: Thyroid Stimulating Hormone, Serum: 1.47 uIU/mL      TELEMETRY: Vpaced  ECG:    DIAGNOSTIC TESTING:  [ ] Echocardiogram:   < from: TTE W or WO Ultrasound Enhancing Agent (10.08.24 @ 15:15) >  TRANSTHORACIC ECHOCARDIOGRAM REPORT  ________________________________________________________________________________                                      _______       *Report Contains Critical Result*       Pt. Name:       SHIREEN CASH   Study Date:    10/8/2024  MRN:            [id number]    YOB: 1950  Accession #:    [id number]    Age:           74 years  Account#:       [id number]   Gender:        F  Heart Rate:     63 bpm       Height:        62.00 in (157.48 cm)  Rhythm:  sinus rhythm Weight:        148.00 lb (67.13 kg)  Blood Pressure: 135/70 mmHg  BSA/BMI:       1.68 m² / 27.07 kg/m²  ________________________________________________________________________________________  Referring Physician:    Rani Rooney  Interpreting Physician: Jim Wilkinson DO  Primary Sonographer:    Lila Gomez    CPT:                ECHO TTE WITH CON COMP W DOPP - .m;DEFINITY ECHO                      CONTRAST PER ML - .m  Indication(s):      Heart failure, unspecified - I50.9  Procedure:          Transthoracic echocardiogram with 2-D, M-mode and complete                      spectral and color flow Doppler.  Ordering Location:  [facility]  Admission Status:   ED  Contrast Injection: Verbal consent was obtained for injection of Ultrasonic                      Enhancing Agent following a discussion of risks and                      benefits.                      Endocardial visualization enhanced with 2 ml of Definity                      Ultrasound enhancing agent (Lot#:[id number] Exp.Date:2025).  UEA Reaction:       Patient had no adverse reaction after injection of                      Ultrasound Enhancing Agent.  Study Information:  Image quality for this study is fair.    _______________________________________________________________________________________     CONCLUSIONS:      1. Left ventricular cavity is normal in size. Left ventricular systolic function is mildly decreased with an ejection fraction of 47 % by Taylor's method of disks with an ejection fraction visually estimated at 45 to 50 %. Regional wall motion abnormalities present.   2. Multiple segmental abnormalities exist. See findings.   3. Unable to assess left ventricular diastolic function due to insufficient data.   4. The rightventricle is not well visualized but probably normal right ventricular cavity size and normal right ventricular systolic function.   5. Left atrium is mildly dilated.   6. Mild tricuspid regurgitation.   7. Thickened mitral valve leaflets.   8. Thereis mild anterior calcification and mild posterior calcification of the mitral valve annulus.   9. Fibrocalcific aortic valve sclerosis without stenosis.  10. Estimated pulmonary artery systolic pressure is 45 mmHg.  11. No pericardial effusion seen.  12. Compared to the transthoracic echocardiogram performed on 2021, there are new regional wall motion abnormalities present and newly reduced LVEF.    < end of copied text >  [ ]  Catheterization: < from: Cardiac Catheterization (24 @ 08:45) >      Study Date:     2024   Name:           SHIREEN CASH   :            1950   (74 years)   Gender:         female   MR#:            [id number]   [facility]#:           [id number]   Patient Class:  Inpatient     Cath Lab Report    Diagnostic Cardiologist:       Kamaljit Ely MD   Referring Physician:           Travis Allen MD     Procedures Performed   Procedures:               1.    Arterial Access - Right Radial     2.    Left Heart Cath   3.    Diagnostic Coronary Angiography   4.    Ultrasound Guided Access   5.    Arterial Access - Right Femoral   6.    AngioSeal     Indications:                Congestive heart failure   Lab Visit Indication:      LV dysfunction, suspected CAD     Pre-Diagnosis:  Congestive Heart Failure, Coronary Artery Disease   Post-Diagnosis: Congestive Heart Failure, Coronary Artery Disease     Diagnostic Conclusions:     1. Multivessel coronary artery disease with interval development of  diffuse, critical in-stent restenosis (up to 99%) in RCA. Stable  95% sequential lesions in both limbs of large branching OM1. Diffuse  severe (up to 95%) stenosis of the mid-apical LAD.    2. LVEDP 15 mmHg.      Recommendations:     Given absence of ACS or angina, and after reviewing case and images  with second interventionalist / ACP, PCI of severe,  prox-distal RCA ISR was deferred in favor of optimizing GDMT for  CHF/cardiomyopathy and outpatient stress imaging to guide  revascularization strategy. Low threshold to pursue RCA  revascularization should any typical or atypical anginal symptoms  develop.      Acute complication:    No complications     Presentation:   Chief Compaint: Progressive exertional dyspnea     Procedure Narrative:   The risks and alternatives of the procedures and conscious sedation  were explained to the patient and informed consent was  obtained. The patient was brought to the cath lab and placed on the  exam table.  Access     Patient: SHIREEN CASH                 MRN: [id number]  Study Date: 2024   08:45 AM      Page 1 of 4          Right radial artery:   The puncture site was infiltrated with 1% Lidocaine. Vascular access  was obtained using modified seldinger technique.  Converted to femoral access due to severe aortosubclavian tortuosity.  . Hemostasis/Sheath Status: Hemostasis was  successful using mechanical compression.    Right femoral artery:   The puncture site was infiltrated with 1% Lidocaine. Vascular access  was obtained using modified seldinger technique.  Hemostasis/Sheath Status: Hemostasis was successful using a sealant.      Coronary Angiography   Left Coronary System:   A catheter was positioned into the vessel ostia under fluoroscopic  guidance. Angiograms were obtained in multiple views.  Right Coronary System:   A catheter was positioned into the vessel ostia under fluoroscopic  guidance. Angiograms were obtained in multiple views.    Diagnostic Findings:     Coronary Angiography   The coronary circulation is right dominant. Cardiac catheterization  was performed urgently.    LM   Left main artery: Very short left main coronary artery with minimal  luminal disease. .    LAD   Left anterior descending artery: Normal caliber vessel with patent  prox-mid LAD stent (mild ISR up to 20% proximally). The mid  to apical portion of the vessel has severe disease (up to 95%  distally). Septal collaterals to distal RPDA are appreciated. JAMES  III flow..      CX   Circumflex: Normal caliber vessel with a 30% eccentric ostial  stenosis. Large OM1 has sequential 95% circumferential  stenoses in both its tortuous branches. 80% focal stenosis of  smaller-caliber OM2. No significant progression since prior  study. .      RCA   Right coronary artery: Normal caliber vessel with critical stippled  in-stent restenosis of prox-distal stent (upto 99%). Patent  RPL branch with moderate diffuse disease has sluggish. RPDA has  severe, diffuse disease with competitive flow appreciated  distally. JAMES II .      Left Heart Cath   LHC performed: Aortic valve crossed and left ventricular pressures  were obtained. LVEDP 15 mmHg.    < end of copied text >    [ ] Stress Test:    OTHER:

## 2024-12-05 NOTE — PROGRESS NOTE ADULT - ASSESSMENT
74 F with PMHX CAD s/p PCI, HFrEF, Mobitz II s/p PPM (MD NereydaT), PAD with chronic right LE wound, CVA, DM2, CKD3, HTN presents to [facility] ER c/o shortness of breath/RAMIREZ and worsening LE edema admitted for Acute on Chronic HFrEF Exacerbation and Hyperkalemia.    # Aute Hypoxic Respiratory failure secondary to   # Acute on Chronic CHF wirh reduced EF   Still requiring 2LNC, though improved  - Daily weight, I/O, salt and fluid restriction  - Supplemental O2, wean as tolerated  - Furosemide 40mg IV q12  - ARNi on hold due to BRADY  - Metoprolol  - TTE  - Cardiology follow up    # BRADY on CKD stage 3b   # Hyperkalemia at presentation, Resolved  Baseline Cr 1.4  -Permissive Azotemia with diuretics   -Avoid Nephrotoxins  -Trend lytes     # Anemia  # Iron deficiency  Dropping Hgb, report melena on Iron supplement  - Transfuse to keep Hgb>8 given CAD  - RONDA and IV Iron ordered  - monitor Hgb  - GI consulted (denies ever having EGD/Colonoscopy)      # CAD s/p PCI,   Mobitz Type 2 s/p MicraPPM  - DAPT, Statin, BB, Nitrate and antianginal    #  Diabetes Mellitus type2 on insulin with neuropathy   A1c 7.1  Uncontrolled currently  - Hold oral hypoglycemic agents  - Blood glucose monitoring with sliding scale  - Glargine 26U  - Add Premeal Lispro 6U   - Gabapentin 300mg qHS    VTE Prophylaxis: SCDs/SQH 5000q8    DISPO: Acute.

## 2024-12-06 DIAGNOSIS — I50.23 ACUTE ON CHRONIC SYSTOLIC (CONGESTIVE) HEART FAILURE: ICD-10-CM

## 2024-12-06 DIAGNOSIS — I25.10 ATHEROSCLEROTIC HEART DISEASE OF NATIVE CORONARY ARTERY WITHOUT ANGINA PECTORIS: ICD-10-CM

## 2024-12-06 LAB
ANION GAP SERPL CALC-SCNC: 14 MMOL/L — SIGNIFICANT CHANGE UP (ref 5–17)
BUN SERPL-MCNC: 29.1 MG/DL — HIGH (ref 8–20)
CALCIUM SERPL-MCNC: 8.8 MG/DL — SIGNIFICANT CHANGE UP (ref 8.4–10.5)
CHLORIDE SERPL-SCNC: 101 MMOL/L — SIGNIFICANT CHANGE UP (ref 96–108)
CO2 SERPL-SCNC: 23 MMOL/L — SIGNIFICANT CHANGE UP (ref 22–29)
CREAT SERPL-MCNC: 1.36 MG/DL — HIGH (ref 0.5–1.3)
EGFR: 41 ML/MIN/1.73M2 — LOW
GLUCOSE BLDC GLUCOMTR-MCNC: 150 MG/DL — HIGH (ref 70–99)
GLUCOSE BLDC GLUCOMTR-MCNC: 186 MG/DL — HIGH (ref 70–99)
GLUCOSE BLDC GLUCOMTR-MCNC: 215 MG/DL — HIGH (ref 70–99)
GLUCOSE SERPL-MCNC: 179 MG/DL — HIGH (ref 70–99)
HCT VFR BLD CALC: 28.1 % — LOW (ref 34.5–45)
HGB BLD-MCNC: 9.3 G/DL — LOW (ref 11.5–15.5)
MCHC RBC-ENTMCNC: 26.6 PG — LOW (ref 27–34)
MCHC RBC-ENTMCNC: 33.1 G/DL — SIGNIFICANT CHANGE UP (ref 32–36)
MCV RBC AUTO: 80.5 FL — SIGNIFICANT CHANGE UP (ref 80–100)
PLATELET # BLD AUTO: 393 K/UL — SIGNIFICANT CHANGE UP (ref 150–400)
POTASSIUM SERPL-MCNC: 4.2 MMOL/L — SIGNIFICANT CHANGE UP (ref 3.5–5.3)
POTASSIUM SERPL-SCNC: 4.2 MMOL/L — SIGNIFICANT CHANGE UP (ref 3.5–5.3)
RBC # BLD: 3.49 M/UL — LOW (ref 3.8–5.2)
RBC # FLD: 18.2 % — HIGH (ref 10.3–14.5)
SODIUM SERPL-SCNC: 138 MMOL/L — SIGNIFICANT CHANGE UP (ref 135–145)
WBC # BLD: 10.89 K/UL — HIGH (ref 3.8–10.5)
WBC # FLD AUTO: 10.89 K/UL — HIGH (ref 3.8–10.5)

## 2024-12-06 PROCEDURE — 99233 SBSQ HOSP IP/OBS HIGH 50: CPT

## 2024-12-06 PROCEDURE — 99232 SBSQ HOSP IP/OBS MODERATE 35: CPT

## 2024-12-06 RX ADMIN — Medication 1: at 17:12

## 2024-12-06 RX ADMIN — RANOLAZINE 500 MILLIGRAM(S): 1000 TABLET, FILM COATED, EXTENDED RELEASE ORAL at 17:12

## 2024-12-06 RX ADMIN — INSULIN GLARGINE 26 UNIT(S): 100 INJECTION, SOLUTION SUBCUTANEOUS at 21:36

## 2024-12-06 RX ADMIN — Medication 5000 UNIT(S): at 06:41

## 2024-12-06 RX ADMIN — Medication 5000 UNIT(S): at 21:35

## 2024-12-06 RX ADMIN — RANOLAZINE 500 MILLIGRAM(S): 1000 TABLET, FILM COATED, EXTENDED RELEASE ORAL at 06:41

## 2024-12-06 RX ADMIN — Medication 5 UNIT(S): at 08:42

## 2024-12-06 RX ADMIN — CLOPIDOGREL 75 MILLIGRAM(S): 75 TABLET, FILM COATED ORAL at 08:39

## 2024-12-06 RX ADMIN — AMLODIPINE BESYLATE 10 MILLIGRAM(S): 10 TABLET ORAL at 06:41

## 2024-12-06 RX ADMIN — GABAPENTIN 300 MILLIGRAM(S): 300 CAPSULE ORAL at 21:35

## 2024-12-06 RX ADMIN — ACETAMINOPHEN 500MG 650 MILLIGRAM(S): 500 TABLET, COATED ORAL at 21:48

## 2024-12-06 RX ADMIN — Medication 5000 UNIT(S): at 13:06

## 2024-12-06 RX ADMIN — Medication 80 MILLIGRAM(S): at 21:35

## 2024-12-06 RX ADMIN — Medication 5 UNIT(S): at 17:12

## 2024-12-06 RX ADMIN — Medication 60 MILLIGRAM(S): at 08:39

## 2024-12-06 RX ADMIN — ACETAMINOPHEN 500MG 650 MILLIGRAM(S): 500 TABLET, COATED ORAL at 22:48

## 2024-12-06 RX ADMIN — IRON SUCROSE 110 MILLIGRAM(S): 20 INJECTION, SOLUTION INTRAVENOUS at 21:35

## 2024-12-06 RX ADMIN — METOPROLOL TARTRATE 100 MILLIGRAM(S): 100 TABLET, FILM COATED ORAL at 06:41

## 2024-12-06 RX ADMIN — Medication 2: at 12:19

## 2024-12-06 RX ADMIN — FUROSEMIDE 40 MILLIGRAM(S): 40 TABLET ORAL at 06:41

## 2024-12-06 RX ADMIN — FUROSEMIDE 40 MILLIGRAM(S): 40 TABLET ORAL at 13:06

## 2024-12-06 RX ADMIN — Medication 5 UNIT(S): at 12:20

## 2024-12-06 RX ADMIN — Medication 81 MILLIGRAM(S): at 08:39

## 2024-12-06 NOTE — PROGRESS NOTE ADULT - SUBJECTIVE AND OBJECTIVE BOX
[facility]                                                         CARDIOLOGY AT [place] [address]                                                         Telephone: [phone]. Fax:[phone]                                                                             PROGRESS NOTE    Reason for follow up: Aoc HFrEF, CAD  Update: vikiing well       Review of symptoms:   Cardiac:  No chest pain. No dyspnea. No palpitations.  Respiratory: no cough. No dyspnea  Gastrointestinal: No diarrhea. No abdominal pain. No bleeding.   Neuro: No focal neuro complaints.    Vitals:  T(C): 36.3 (12-06-24 @ 08:16), Max: 36.7 (12-05-24 @ 17:00)  HR: 68 (12-06-24 @ 08:16) (64 - 68)  BP: 154/79 (12-06-24 @ 08:16) (145/74 - 154/79)  RR: 18 (12-06-24 @ 08:16) (17 - 20)  SpO2: 93% (12-06-24 @ 08:16) (93% - 97%)  Wt(kg): --  I&O's Summary    05 Dec 2024 07:01  -  06 Dec 2024 07:00  --------------------------------------------------------  IN: 1040 mL / OUT: 2600 mL / NET: -1560 mL    06 Dec 2024 07:01  -  06 Dec 2024 09:56  --------------------------------------------------------  IN: 0 mL / OUT: 800 mL / NET: -800 mL      Weight (kg): 68.5 (12-03 @ 15:29)    PHYSICAL EXAM:  Appearance: Comfortable. No acute distress  HEENT:  Atraumatic. Normocephalic.  Normal oral mucosa  Neurologic: A & O x 3, no gross focal deficits.  Cardiovascular: RRR S1 S2, No murmur, no rubs/gallops. +JVD  Respiratory: Lungs clear to auscultation, unlabored   Gastrointestinal:  Soft, Non-tender, + BS  Lower Extremities: 2+ Peripheral Pulses, No clubbing, cyanosis, + non pitting edema +ecchymosis   Psychiatry: Patient is calm. No agitation.   Skin: warm and dry.    CURRENT CARDIAC MEDICATIONS:  amLODIPine   Tablet 10 milliGRAM(s) Oral daily  furosemide   Injectable 40 milliGRAM(s) IV Push two times a day  isosorbide   mononitrate ER Tablet (IMDUR) 60 milliGRAM(s) Oral daily  metoprolol succinate  milliGRAM(s) Oral daily  ranolazine 500 milliGRAM(s) Oral two times a day      CURRENT OTHER MEDICATIONS:  acetaminophen     Tablet .. 650 milliGRAM(s) Oral every 6 hours PRN Temp greater or equal to 38C (100.4F), Mild Pain (1 - 3)  gabapentin 300 milliGRAM(s) Oral at bedtime  melatonin 3 milliGRAM(s) Oral at bedtime PRN Insomnia  ondansetron Injectable 4 milliGRAM(s) IV Push every 8 hours PRN Nausea and/or Vomiting  aluminum hydroxide/magnesium hydroxide/simethicone Suspension 30 milliLiter(s) Oral every 4 hours PRN Dyspepsia  atorvastatin 80 milliGRAM(s) Oral at bedtime  dextrose 50% Injectable 25 Gram(s) IV Push once, Stop order after: 1 Doses  dextrose 50% Injectable 12.5 Gram(s) IV Push once, Stop order after: 1 Doses  dextrose 50% Injectable 25 Gram(s) IV Push once, Stop order after: 1 Doses  dextrose Oral Gel 15 Gram(s) Oral once, Stop order after: 1 Doses PRN Blood Glucose LESS THAN 70 milliGRAM(s)/deciliter  glucagon  Injectable 1 milliGRAM(s) IntraMuscular once, Stop order after: 1 Doses  insulin glargine Injectable (LANTUS) 26 Unit(s) SubCutaneous at bedtime  insulin lispro (ADMELOG) corrective regimen sliding scale   SubCutaneous Before meals and at bedtime  insulin lispro Injectable (ADMELOG) 5 Unit(s) SubCutaneous three times a day before meals  aspirin enteric coated 81 milliGRAM(s) Oral daily  clopidogrel Tablet 75 milliGRAM(s) Oral daily  dextrose 5%. 1000 milliLiter(s) (50 mL/Hr) IV Continuous <Continuous>  dextrose 5%. 1000 milliLiter(s) (100 mL/Hr) IV Continuous <Continuous>  heparin   Injectable 5000 Unit(s) SubCutaneous every 8 hours  iron sucrose IVPB 200 milliGRAM(s) IV Intermittent every 24 hours, Stop order after: 3 Doses      LABS:	 	                            9.3    10.89 )-----------( 393      ( 06 Dec 2024 04:58 )             28.1     12-06    138  |  101  |  29.1[H]  ----------------------------<  179[H]  4.2   |  23.0  |  1.36[H]    Ca    8.8      06 Dec 2024 04:58        Lipid Profile:   HgA1c:   TSH: Thyroid Stimulating Hormone, Serum: 1.47 uIU/mL      TELEMETRY: v paced       DIAGNOSTIC TESTING:  [ ] Echocardiogram:   < from: TTE W or WO Ultrasound Enhancing Agent (10.08.24 @ 15:15) >  CONCLUSIONS:      1. Left ventricular cavity is normal in size. Left ventricular systolic function is mildly decreased with an ejection fraction of 47 % by Taylor's method of disks with an ejection fraction visually estimated at 45 to 50 %. Regional wall motion abnormalities present.   2. Multiple segmental abnormalities exist. See findings.   3. Unable to assess left ventricular diastolic function due to insufficient data.   4. The rightventricle is not well visualized but probably normal right ventricular cavity size and normal right ventricular systolic function.   5. Left atrium is mildly dilated.   6. Mild tricuspid regurgitation.   7. Thickened mitral valve leaflets.   8. Thereis mild anterior calcification and mild posterior calcification of the mitral valve annulus.   9. Fibrocalcific aortic valve sclerosis without stenosis.  10. Estimated pulmonary artery systolic pressure is 45 mmHg.  11. No pericardial effusion seen.  12. Compared to the transthoracic echocardiogram performed on 11/25/2021, there are new regional wall motion abnormalities present and newly reduced LVEF.    < end of copied text >    [ ]  Catheterization:  < from: Cardiac Catheterization (11.19.24 @ 08:45) >  Diagnostic Conclusions:     1. Multivessel coronary artery disease with interval development of  diffuse, critical in-stent restenosis (up to 99%) in RCA. Stable  95% sequential lesions in both limbs of large branching OM1. Diffuse  severe (up to 95%) stenosis of the mid-apical LAD.    2. LVEDP 15 mmHg.      < end of copied text >

## 2024-12-06 NOTE — PROGRESS NOTE ADULT - SUBJECTIVE AND OBJECTIVE BOX
Patient is a 74y old  Female who presents with a chief complaint of Acute on Chronic HFrEF Exacerbation (06 Dec 2024 10:58)    INTERVAL HPI/OVERNIGHT EVENTS: No acute events overnight. HD stable.     MEDICATIONS  (STANDING):  amLODIPine   Tablet 10 milliGRAM(s) Oral daily  aspirin enteric coated 81 milliGRAM(s) Oral daily  atorvastatin 80 milliGRAM(s) Oral at bedtime  clopidogrel Tablet 75 milliGRAM(s) Oral daily  dextrose 5%. 1000 milliLiter(s) (50 mL/Hr) IV Continuous <Continuous>  dextrose 5%. 1000 milliLiter(s) (100 mL/Hr) IV Continuous <Continuous>  dextrose 50% Injectable 25 Gram(s) IV Push once  dextrose 50% Injectable 12.5 Gram(s) IV Push once  dextrose 50% Injectable 25 Gram(s) IV Push once  furosemide   Injectable 40 milliGRAM(s) IV Push two times a day  gabapentin 300 milliGRAM(s) Oral at bedtime  glucagon  Injectable 1 milliGRAM(s) IntraMuscular once  heparin   Injectable 5000 Unit(s) SubCutaneous every 8 hours  insulin glargine Injectable (LANTUS) 26 Unit(s) SubCutaneous at bedtime  insulin lispro (ADMELOG) corrective regimen sliding scale   SubCutaneous Before meals and at bedtime  insulin lispro Injectable (ADMELOG) 5 Unit(s) SubCutaneous three times a day before meals  iron sucrose IVPB 200 milliGRAM(s) IV Intermittent every 24 hours  isosorbide   mononitrate ER Tablet (IMDUR) 60 milliGRAM(s) Oral daily  metoprolol succinate  milliGRAM(s) Oral daily  ranolazine 500 milliGRAM(s) Oral two times a day    MEDICATIONS  (PRN):  acetaminophen     Tablet .. 650 milliGRAM(s) Oral every 6 hours PRN Temp greater or equal to 38C (100.4F), Mild Pain (1 - 3)  aluminum hydroxide/magnesium hydroxide/simethicone Suspension 30 milliLiter(s) Oral every 4 hours PRN Dyspepsia  dextrose Oral Gel 15 Gram(s) Oral once PRN Blood Glucose LESS THAN 70 milliGRAM(s)/deciliter  melatonin 3 milliGRAM(s) Oral at bedtime PRN Insomnia  ondansetron Injectable 4 milliGRAM(s) IV Push every 8 hours PRN Nausea and/or Vomiting      Allergies    No Known Allergies    Intolerances        REVIEW OF SYSTEMS: all negative with exception of above    Vital Signs Last 24 Hrs  T(C): 36.9 (06 Dec 2024 12:30), Max: 36.9 (06 Dec 2024 12:30)  T(F): 98.5 (06 Dec 2024 12:30), Max: 98.5 (06 Dec 2024 12:30)  HR: 65 (06 Dec 2024 12:30) (64 - 68)  BP: 139/67 (06 Dec 2024 12:30) (139/67 - 154/79)  BP(mean): 91 (06 Dec 2024 12:30) (91 - 104)  RR: 18 (06 Dec 2024 12:30) (17 - 20)  SpO2: 97% (06 Dec 2024 12:30) (93% - 97%)    Parameters below as of 06 Dec 2024 12:30  Patient On (Oxygen Delivery Method): room air        PHYSICAL EXAM:  GENERAL: NAD, well-groomed  NERVOUS SYSTEM:  Alert & Oriented X3, Good concentration; Motor Strength 5/5 B/L upper and lower extremities; DTRs 2+ intact and symmetric  CHEST/LUNG: Clear to percussion bilaterally; No rales, rhonchi, wheezing, or rubs  HEART: Regular rate and rhythm; No murmurs, rubs, or gallops  ABDOMEN: Soft, Nontender, Nondistended; Bowel sounds present  EXTREMITIES:  2+ Peripheral Pulses, No clubbing, cyanosis, or edema    LABS:                        9.3    10.89 )-----------( 393      ( 06 Dec 2024 04:58 )             28.1     12-06    138  |  101  |  29.1[H]  ----------------------------<  179[H]  4.2   |  23.0  |  1.36[H]    Ca    8.8      06 Dec 2024 04:58        Urinalysis Basic - ( 06 Dec 2024 04:58 )    Color: x / Appearance: x / SG: x / pH: x  Gluc: 179 mg/dL / Ketone: x  / Bili: x / Urobili: x   Blood: x / Protein: x / Nitrite: x   Leuk Esterase: x / RBC: x / WBC x   Sq Epi: x / Non Sq Epi: x / Bacteria: x      CAPILLARY BLOOD GLUCOSE      POCT Blood Glucose.: 215 mg/dL (06 Dec 2024 12:18)  POCT Blood Glucose.: 150 mg/dL (06 Dec 2024 08:36)  POCT Blood Glucose.: 166 mg/dL (05 Dec 2024 23:12)  POCT Blood Glucose.: 213 mg/dL (05 Dec 2024 17:36)      RADIOLOGY & ADDITIONAL TESTS:    Imaging Personally Reviewed:  [ ] YES  [ ] NO  < from: TTE W or WO Ultrasound Enhancing Agent (12.05.24 @ 21:37) >  CONCLUSIONS:      1. Technically difficult image quality.   2. Left ventricular cavity is normal in size. Left ventricular systolic function is moderately decreased with an ejection fraction of 39 % by Taylor's method of disks with an ejection fraction visually estimated at 35 to 40 %.   3. Multiple segmental abnormalities exist. See findings.   4. Unable to assess left ventricular diastolic function due to insufficient data.   5. Normal right ventricular cavity size and normal right ventricular systolic function.   6. Left atrium is normal in size.   7. Mild to moderate mitral regurgitation.   8. Mild tricuspid regurgitation.   9. Estimated pulmonary artery systolic pressure is 38 mmHg, consistent with mild pulmonary hypertension.  10. Trileaflet aortic valve. Fibrocalcific aortic valve sclerosis without stenosis.  11. Trace aortic regurgitation.  12. No pericardial effusion seen.  13. Compared to the transthoracic echocardiogram performed on 10/8/2024, there is further decline in left ventricular function, from 45-50% to 35-40%.    ________________________________________________________________________________________    < end of copied text >        Consultant(s) Notes Reviewed:  [ ] YES  [ ] NO    Care Discussed with Consultants/Other Providers [ ] YES  [ ] NO

## 2024-12-06 NOTE — PROGRESS NOTE ADULT - ASSESSMENT
74 F with PMHX CAD s/p PCI, HFrEF, Mobitz II s/p PPM (MIRNA Dawn), PAD with chronic right LE wound, CVA, DM2, CKD3, HTN presents to [facility] ER c/o shortness of breath/RAMIREZ and worsening LE edema admitted for Acute on Chronic HFrEF Exacerbation and Hyperkalemia.    # Aute Hypoxic Respiratory failure secondary to   # Acute on Chronic CHF wirh reduced EF   Still requiring 2LNC, though improved  - Daily weight, I/O, salt and fluid restriction  - Supplemental O2, wean as tolerated  - Furosemide 40mg IV q12  - ARNi on hold due to BRADY  - Metoprolol  - TTE reviewed  - Cardiology follow up- plan for [facility] with PCI monday  - EP to evaluate and possibly upgrade PPM    # BRADY on CKD stage 3b   # Hyperkalemia at presentation, Resolved  Baseline Cr 1.4  -Permissive Azotemia with diuretics   -Avoid Nephrotoxins  -Trend lytes     # Anemia  # Iron deficiency  Dropping Hgb, report melena on Iron supplement  - Transfuse to keep Hgb>8 given CAD  - RONDA and IV Iron ordered  - monitor Hgb  - GI consulted - will need o/p f/u colorectal cancer screening    # CAD s/p PCI,   Mobitz Type 2 s/p MicraPPM  - DAPT, Statin, BB, Nitrate and antianginal    #  Diabetes Mellitus type2 on insulin with neuropathy   A1c 7.1  Uncontrolled currently  - Hold oral hypoglycemic agents  - Blood glucose monitoring with sliding scale  - Glargine 26U  - Add Premeal Lispro 6U   - Gabapentin 300mg qHS    VTE Prophylaxis: SCDs/SQH 5000q8    DISPO: Acute.

## 2024-12-06 NOTE — DIETITIAN INITIAL EVALUATION ADULT - OTHER INFO
74 F with PMHX CAD s/p PCI, HFrEF, Mobitz II s/p PPM (MD NereydaT), PAD with chronic right LE wound, CVA, DM2, CKD3, HTN presents to [facility] ER c/o shortness of breath/RAMIREZ and worsening LE edema admitted for Acute on Chronic HFrEF Exacerbation and Hyperkalemia.

## 2024-12-06 NOTE — DIETITIAN INITIAL EVALUATION ADULT - ADD RECOMMEND
1) Continue diet as tolerated.   2) Encourage po intake, monitor diet tolerance, and provide assistance at meals as needed.   3) Rx: MVI daily.   4) Obtain daily weights to monitor trends.

## 2024-12-06 NOTE — DIETITIAN INITIAL EVALUATION ADULT - PERTINENT LABORATORY DATA
12-06 Na138 mmol/L Glu 179 mg/dL[H] K+ 4.2 mmol/L Cr  1.36 mg/dL[H] BUN 29.1 mg/dL[H]     POCT Blood Glucose.: 150 mg/dL (12-06-24 @ 08:36)  A1C with Estimated Average Glucose Result: 7.1 % (10-09-24 @ 04:43)

## 2024-12-06 NOTE — PROGRESS NOTE ADULT - ASSESSMENT
74F Kazakh-speaking ( # [id number]) history significant for HTN, DM2, CKD 3, heart block s/p Micra PPM, CVA, CAD/stent, PAD, CVA, HFmrEF with recurrent hospitalization for HF in 10/8-10/14 and 11/16-11/21/24 underwent repeat LHC on 11/19/24 with critical ISR of RCA stent 99% and 95% OM1 with 95% [id number] LAD no PCI perform yet for optimization of CHF, discharged on Lasix 40mg once daily with low dose Entresto had mild BRADY, interval with worsening dyspnea despite extra Lasix use prompted return to the ER and readmitted for ADHF,

## 2024-12-06 NOTE — PROGRESS NOTE ADULT - SUBJECTIVE AND OBJECTIVE BOX
Chief Complaint:  Patient is a 74y old  Female who presents with a chief complaint of Acute on chronic systolic congestive heart failure      HPI/ 24 hr events: Patient seen and examined at bedside. Pt feeling well this morning. Tolerating diet. Denies nausea, vomiting, diarrhea, abdominal pain, hematemesis, hematochezia, melena. Vitals are overall stable, Hgb 9.3 s/p 1 unit PRBC.      REVIEW OF SYSTEMS:   General: Negative  HEENT: Negative  CV: Negative  Respiratory: Negative  GI: See HPI  : Negative  MSK: Negative  Hematologic: Negative  Skin: Negative    MEDICATIONS:   MEDICATIONS  (STANDING):  amLODIPine   Tablet 10 milliGRAM(s) Oral daily  aspirin enteric coated 81 milliGRAM(s) Oral daily  atorvastatin 80 milliGRAM(s) Oral at bedtime  clopidogrel Tablet 75 milliGRAM(s) Oral daily  dextrose 5%. 1000 milliLiter(s) (50 mL/Hr) IV Continuous <Continuous>  dextrose 5%. 1000 milliLiter(s) (100 mL/Hr) IV Continuous <Continuous>  dextrose 50% Injectable 25 Gram(s) IV Push once  dextrose 50% Injectable 12.5 Gram(s) IV Push once  dextrose 50% Injectable 25 Gram(s) IV Push once  furosemide   Injectable 40 milliGRAM(s) IV Push two times a day  gabapentin 300 milliGRAM(s) Oral at bedtime  glucagon  Injectable 1 milliGRAM(s) IntraMuscular once  heparin   Injectable 5000 Unit(s) SubCutaneous every 8 hours  insulin glargine Injectable (LANTUS) 26 Unit(s) SubCutaneous at bedtime  insulin lispro (ADMELOG) corrective regimen sliding scale   SubCutaneous Before meals and at bedtime  insulin lispro Injectable (ADMELOG) 5 Unit(s) SubCutaneous three times a day before meals  iron sucrose IVPB 200 milliGRAM(s) IV Intermittent every 24 hours  isosorbide   mononitrate ER Tablet (IMDUR) 60 milliGRAM(s) Oral daily  metoprolol succinate  milliGRAM(s) Oral daily  ranolazine 500 milliGRAM(s) Oral two times a day    MEDICATIONS  (PRN):  acetaminophen     Tablet .. 650 milliGRAM(s) Oral every 6 hours PRN Temp greater or equal to 38C (100.4F), Mild Pain (1 - 3)  aluminum hydroxide/magnesium hydroxide/simethicone Suspension 30 milliLiter(s) Oral every 4 hours PRN Dyspepsia  dextrose Oral Gel 15 Gram(s) Oral once PRN Blood Glucose LESS THAN 70 milliGRAM(s)/deciliter  melatonin 3 milliGRAM(s) Oral at bedtime PRN Insomnia  ondansetron Injectable 4 milliGRAM(s) IV Push every 8 hours PRN Nausea and/or Vomiting      Packed Red Cells Order:  1 Unit  Indication: Hgb <8 gm/dL -Stable Cardiovascular Disease or Acute Co  Infuse Unit : 3 Hours (12-05-24 @ 12:53)        DIET:  Diet, DASH/TLC:   Sodium & Cholesterol Restricted  Consistent Carbohydrate Evening Snack (CSTCHOSN)  1500mL Fluid Restriction ([id number]) (12-04-24 @ 01:00) [Active]          ALLERGIES:   Allergies    No Known Allergies    Intolerances        VITAL SIGNS:   Vital Signs Last 24 Hrs  T(C): 36.3 (06 Dec 2024 08:16), Max: 36.7 (05 Dec 2024 17:00)  T(F): 97.3 (06 Dec 2024 08:16), Max: 98 (05 Dec 2024 17:00)  HR: 68 (06 Dec 2024 08:16) (64 - 68)  BP: 154/79 (06 Dec 2024 08:16) (145/74 - 154/79)  BP(mean): 104 (06 Dec 2024 08:16) (97 - 104)  RR: 18 (06 Dec 2024 08:16) (17 - 20)  SpO2: 93% (06 Dec 2024 08:16) (93% - 97%)    Parameters below as of 06 Dec 2024 08:16  Patient On (Oxygen Delivery Method): room air      I&O's Summary    05 Dec 2024 07:01  -  06 Dec 2024 07:00  --------------------------------------------------------  IN: 1040 mL / OUT: 2600 mL / NET: -1560 mL    06 Dec 2024 07:01  -  06 Dec 2024 10:58  --------------------------------------------------------  IN: 432 mL / OUT: 1500 mL / NET: -1068 mL        PHYSICAL EXAM:   GENERAL:  No acute distress  HEENT:  NC/AT, conjunctiva clear, sclera anicteric  CHEST:  No increased effort  HEART:  Regular rate  ABDOMEN:  Soft, non-tender, non-distended, normoactive bowel sounds, no rebound or guarding  EXTREMITIES: No edema  SKIN:  Warm, dry  NEURO:  Calm, cooperative    LABS:                        9.3    10.89 )-----------( 393      ( 06 Dec 2024 04:58 )             28.1     Hemoglobin: 9.3 g/dL (12-06-24 @ 04:58)  Hemoglobin: 7.1 g/dL (12-05-24 @ 08:47)  Hemoglobin: 7.5 g/dL (12-04-24 @ 16:15)  Hemoglobin: 7.8 g/dL (12-04-24 @ 05:00)  Hemoglobin: 9.8 g/dL (12-03-24 @ 17:00)    12-06    138  |  101  |  29.1[H]  ----------------------------<  179[H]  4.2   |  23.0  |  1.36[H]    Ca    8.8      06 Dec 2024 04:58                                                  RADIOLOGY & ADDITIONAL STUDIES:         Chief Complaint:  Patient is a 74y old  Female who presents with a chief complaint of Acute on chronic systolic congestive heart failure    HPI/ 24 hr events: Patient seen and examined at bedside. Pt feeling well this morning. Tolerating diet. Denies nausea, vomiting, diarrhea, abdominal pain, hematemesis, hematochezia, melena. Vitals are overall stable, Hgb 9.3 s/p 1 unit PRBC.    REVIEW OF SYSTEMS:   General: Negative  HEENT: Negative  CV: Negative  Respiratory: Negative  GI: See HPI  : Negative  MSK: Negative  Hematologic: Negative  Skin: Negative    MEDICATIONS:   MEDICATIONS  (STANDING):  amLODIPine   Tablet 10 milliGRAM(s) Oral daily  aspirin enteric coated 81 milliGRAM(s) Oral daily  atorvastatin 80 milliGRAM(s) Oral at bedtime  clopidogrel Tablet 75 milliGRAM(s) Oral daily  dextrose 5%. 1000 milliLiter(s) (50 mL/Hr) IV Continuous <Continuous>  dextrose 5%. 1000 milliLiter(s) (100 mL/Hr) IV Continuous <Continuous>  dextrose 50% Injectable 25 Gram(s) IV Push once  dextrose 50% Injectable 12.5 Gram(s) IV Push once  dextrose 50% Injectable 25 Gram(s) IV Push once  furosemide   Injectable 40 milliGRAM(s) IV Push two times a day  gabapentin 300 milliGRAM(s) Oral at bedtime  glucagon  Injectable 1 milliGRAM(s) IntraMuscular once  heparin   Injectable 5000 Unit(s) SubCutaneous every 8 hours  insulin glargine Injectable (LANTUS) 26 Unit(s) SubCutaneous at bedtime  insulin lispro (ADMELOG) corrective regimen sliding scale   SubCutaneous Before meals and at bedtime  insulin lispro Injectable (ADMELOG) 5 Unit(s) SubCutaneous three times a day before meals  iron sucrose IVPB 200 milliGRAM(s) IV Intermittent every 24 hours  isosorbide   mononitrate ER Tablet (IMDUR) 60 milliGRAM(s) Oral daily  metoprolol succinate  milliGRAM(s) Oral daily  ranolazine 500 milliGRAM(s) Oral two times a day    MEDICATIONS  (PRN):  acetaminophen     Tablet .. 650 milliGRAM(s) Oral every 6 hours PRN Temp greater or equal to 38C (100.4F), Mild Pain (1 - 3)  aluminum hydroxide/magnesium hydroxide/simethicone Suspension 30 milliLiter(s) Oral every 4 hours PRN Dyspepsia  dextrose Oral Gel 15 Gram(s) Oral once PRN Blood Glucose LESS THAN 70 milliGRAM(s)/deciliter  melatonin 3 milliGRAM(s) Oral at bedtime PRN Insomnia  ondansetron Injectable 4 milliGRAM(s) IV Push every 8 hours PRN Nausea and/or Vomiting      Packed Red Cells Order:  1 Unit  Indication: Hgb <8 gm/dL -Stable Cardiovascular Disease or Acute Co  Infuse Unit : 3 Hours (12-05-24 @ 12:53)      DIET:  Diet, DASH/TLC:   Sodium & Cholesterol Restricted  Consistent Carbohydrate Evening Snack (CSTCHOSN)  1500mL Fluid Restriction ([id number]) (12-04-24 @ 01:00) [Active]      ALLERGIES:   Allergies  No Known Allergies  Intolerances      VITAL SIGNS:   Vital Signs Last 24 Hrs  T(C): 36.3 (06 Dec 2024 08:16), Max: 36.7 (05 Dec 2024 17:00)  T(F): 97.3 (06 Dec 2024 08:16), Max: 98 (05 Dec 2024 17:00)  HR: 68 (06 Dec 2024 08:16) (64 - 68)  BP: 154/79 (06 Dec 2024 08:16) (145/74 - 154/79)  BP(mean): 104 (06 Dec 2024 08:16) (97 - 104)  RR: 18 (06 Dec 2024 08:16) (17 - 20)  SpO2: 93% (06 Dec 2024 08:16) (93% - 97%)    Parameters below as of 06 Dec 2024 08:16  Patient On (Oxygen Delivery Method): room air      I&O's Summary    05 Dec 2024 07:01  -  06 Dec 2024 07:00  --------------------------------------------------------  IN: 1040 mL / OUT: 2600 mL / NET: -1560 mL    06 Dec 2024 07:01  -  06 Dec 2024 10:58  --------------------------------------------------------  IN: 432 mL / OUT: 1500 mL / NET: -1068 mL      PHYSICAL EXAM:   GENERAL:  No acute distress  HEENT:  NC/AT, conjunctiva clear, sclera anicteric  CHEST:  No increased effort  HEART:  Regular rate  ABDOMEN:  Soft, non-tender, non-distended, normoactive bowel sounds, no rebound or guarding  EXTREMITIES: No edema  SKIN:  Warm, dry  NEURO:  Calm, cooperative    LABS:                  9.3    10.89 )-----------( 393      ( 06 Dec 2024 04:58 )             28.1     Hemoglobin: 9.3 g/dL (12-06-24 @ 04:58)  Hemoglobin: 7.1 g/dL (12-05-24 @ 08:47)  Hemoglobin: 7.5 g/dL (12-04-24 @ 16:15)  Hemoglobin: 7.8 g/dL (12-04-24 @ 05:00)  Hemoglobin: 9.8 g/dL (12-03-24 @ 17:00)    12-06  138  |  101  |  29.1[H]  ----------------------------<  179[H]  4.2   |  23.0  |  1.36[H]    Ca    8.8      06 Dec 2024 04:58    RADIOLOGY & ADDITIONAL STUDIES:

## 2024-12-06 NOTE — PROGRESS NOTE ADULT - ASSESSMENT
74F with PMHX CAD s/p PCI, HFrEF, Mobitz II s/p PPM (MIRNA Dawn), PAD with chronic right LE wound, CVA, DM2, CKD3, HTN admitted with CHF exacerbation.    Dark color stool  Drop in hemoglobin   Hemoglobin 7.1 gm, normocytic, baseline 9.8 gm on 12/03/24. Iron panel c/w anemia of chronic disease. Admitted with CHF exacerbation, elevated proBNP [id number].  NAN showed dark green color stool. Patient takes Iron supplements   - Trend CBC, transfuse as needed to keep hgb 8 or higher.  Monitor for signs of bleeding.   - Protonix 40 mg IV BID  -Avoid NSAIDs  - diet as tolerates  -Okay to continue Plavix as there is no evidence of overt GI bleeding  -No plans for urgent or emergent endoscopic intervention at this time  -Further care per primary team  GI will sign off now. Please call us back with any questions or concern.   _________________________________________________________________  Assessment and recommendations are final when note is signed by the attending physician.

## 2024-12-06 NOTE — DIETITIAN INITIAL EVALUATION ADULT - PERTINENT MEDS FT
MEDICATIONS  (STANDING):  amLODIPine   Tablet 10 milliGRAM(s) Oral daily  clopidogrel Tablet 75 milliGRAM(s) Oral daily  dextrose 5%. 1000 milliLiter(s) (50 mL/Hr) IV Continuous <Continuous>  dextrose 50% Injectable 25 Gram(s) IV Push once  furosemide   Injectable 40 milliGRAM(s) IV Push two times a day  glucagon  Injectable 1 milliGRAM(s) IntraMuscular once  insulin glargine Injectable (LANTUS) 26 Unit(s) SubCutaneous at bedtime  insulin lispro (ADMELOG) corrective regimen sliding scale   SubCutaneous Before meals and at bedtime  iron sucrose IVPB 200 milliGRAM(s) IV Intermittent every 24 hours  isosorbide   mononitrate ER Tablet (IMDUR) 60 milliGRAM(s) Oral daily  metoprolol succinate  milliGRAM(s) Oral daily  ranolazine 500 milliGRAM(s) Oral two times a day    MEDICATIONS  (PRN):  dextrose Oral Gel 15 Gram(s) Oral once PRN Blood Glucose LESS THAN 70 milliGRAM(s)/deciliter  ondansetron Injectable 4 milliGRAM(s) IV Push every 8 hours PRN Nausea and/or Vomiting

## 2024-12-06 NOTE — DIETITIAN INITIAL EVALUATION ADULT - ORAL INTAKE PTA/DIET HISTORY
Patient [place] speaking - family at bedside and declined free  services. Pt tolerating current diet well with good appetite/PO intake. Pt ate 100% of breakfast this AM. PTA pt also with good appetite and ate 3 meals/day. Family reports pt follows a no added salt and no added sugar diet. Pt and family provided with additional education on DASH/TLC, Consistent carbohydrate diet with fluid restriction. Pt and family with good understanding. State they are very familiar with the diet restrictions. Pt with no c/o N/V/D at this time, last reported BM x3 days ago. RN made aware of the same. Pt's UBW about 145 lbs, report weight gain secondary to edema. Current weight 151 lbs, mild edema noted which may influence weights. Will continue to monitor and follow up as needed. RD remains available.

## 2024-12-06 NOTE — DIETITIAN INITIAL EVALUATION ADULT - NS FNS DIET ORDER
Diet, DASH/TLC:   Sodium & Cholesterol Restricted  Consistent Carbohydrate {Evening Snack} (CSTCHOSN)  1500mL Fluid Restriction ([id number]) (12-04-24 @ 01:00)

## 2024-12-07 LAB
ALBUMIN SERPL ELPH-MCNC: 3.6 G/DL — SIGNIFICANT CHANGE UP (ref 3.3–5.2)
ALP SERPL-CCNC: 82 U/L — SIGNIFICANT CHANGE UP (ref 40–120)
ALT FLD-CCNC: 8 U/L — SIGNIFICANT CHANGE UP
ANION GAP SERPL CALC-SCNC: 15 MMOL/L — SIGNIFICANT CHANGE UP (ref 5–17)
AST SERPL-CCNC: 10 U/L — SIGNIFICANT CHANGE UP
BILIRUB SERPL-MCNC: 0.3 MG/DL — LOW (ref 0.4–2)
BUN SERPL-MCNC: 30 MG/DL — HIGH (ref 8–20)
CALCIUM SERPL-MCNC: 8.7 MG/DL — SIGNIFICANT CHANGE UP (ref 8.4–10.5)
CHLORIDE SERPL-SCNC: 101 MMOL/L — SIGNIFICANT CHANGE UP (ref 96–108)
CO2 SERPL-SCNC: 22 MMOL/L — SIGNIFICANT CHANGE UP (ref 22–29)
CREAT SERPL-MCNC: 1.57 MG/DL — HIGH (ref 0.5–1.3)
EGFR: 35 ML/MIN/1.73M2 — LOW
GLUCOSE BLDC GLUCOMTR-MCNC: 184 MG/DL — HIGH (ref 70–99)
GLUCOSE BLDC GLUCOMTR-MCNC: 205 MG/DL — HIGH (ref 70–99)
GLUCOSE BLDC GLUCOMTR-MCNC: 224 MG/DL — HIGH (ref 70–99)
GLUCOSE BLDC GLUCOMTR-MCNC: 271 MG/DL — HIGH (ref 70–99)
GLUCOSE BLDC GLUCOMTR-MCNC: 82 MG/DL — SIGNIFICANT CHANGE UP (ref 70–99)
GLUCOSE SERPL-MCNC: 182 MG/DL — HIGH (ref 70–99)
HCT VFR BLD CALC: 33.2 % — LOW (ref 34.5–45)
HGB BLD-MCNC: 10.6 G/DL — LOW (ref 11.5–15.5)
MCHC RBC-ENTMCNC: 26.1 PG — LOW (ref 27–34)
MCHC RBC-ENTMCNC: 31.9 G/DL — LOW (ref 32–36)
MCV RBC AUTO: 81.8 FL — SIGNIFICANT CHANGE UP (ref 80–100)
PLATELET # BLD AUTO: 488 K/UL — HIGH (ref 150–400)
POTASSIUM SERPL-MCNC: 4.2 MMOL/L — SIGNIFICANT CHANGE UP (ref 3.5–5.3)
POTASSIUM SERPL-SCNC: 4.2 MMOL/L — SIGNIFICANT CHANGE UP (ref 3.5–5.3)
PROT SERPL-MCNC: 7.3 G/DL — SIGNIFICANT CHANGE UP (ref 6.6–8.7)
RBC # BLD: 4.06 M/UL — SIGNIFICANT CHANGE UP (ref 3.8–5.2)
RBC # FLD: 18.5 % — HIGH (ref 10.3–14.5)
SODIUM SERPL-SCNC: 138 MMOL/L — SIGNIFICANT CHANGE UP (ref 135–145)
WBC # BLD: 12.82 K/UL — HIGH (ref 3.8–10.5)
WBC # FLD AUTO: 12.82 K/UL — HIGH (ref 3.8–10.5)

## 2024-12-07 PROCEDURE — 99233 SBSQ HOSP IP/OBS HIGH 50: CPT

## 2024-12-07 PROCEDURE — 99232 SBSQ HOSP IP/OBS MODERATE 35: CPT

## 2024-12-07 RX ORDER — FUROSEMIDE 40 MG/1
40 TABLET ORAL DAILY
Refills: 0 | Status: DISCONTINUED | OUTPATIENT
Start: 2024-12-08 | End: 2024-12-08

## 2024-12-07 RX ADMIN — AMLODIPINE BESYLATE 10 MILLIGRAM(S): 10 TABLET ORAL at 05:01

## 2024-12-07 RX ADMIN — CLOPIDOGREL 75 MILLIGRAM(S): 75 TABLET, FILM COATED ORAL at 08:44

## 2024-12-07 RX ADMIN — Medication 81 MILLIGRAM(S): at 08:44

## 2024-12-07 RX ADMIN — Medication 3: at 12:09

## 2024-12-07 RX ADMIN — Medication 60 MILLIGRAM(S): at 08:44

## 2024-12-07 RX ADMIN — Medication 5 UNIT(S): at 17:43

## 2024-12-07 RX ADMIN — ACETAMINOPHEN 500MG 650 MILLIGRAM(S): 500 TABLET, COATED ORAL at 22:30

## 2024-12-07 RX ADMIN — Medication 1: at 08:45

## 2024-12-07 RX ADMIN — INSULIN GLARGINE 26 UNIT(S): 100 INJECTION, SOLUTION SUBCUTANEOUS at 21:36

## 2024-12-07 RX ADMIN — Medication 5000 UNIT(S): at 05:01

## 2024-12-07 RX ADMIN — GABAPENTIN 300 MILLIGRAM(S): 300 CAPSULE ORAL at 21:35

## 2024-12-07 RX ADMIN — Medication 5 UNIT(S): at 12:10

## 2024-12-07 RX ADMIN — Medication 5000 UNIT(S): at 13:24

## 2024-12-07 RX ADMIN — Medication 80 MILLIGRAM(S): at 21:35

## 2024-12-07 RX ADMIN — Medication 5 UNIT(S): at 08:45

## 2024-12-07 RX ADMIN — METOPROLOL TARTRATE 100 MILLIGRAM(S): 100 TABLET, FILM COATED ORAL at 05:01

## 2024-12-07 RX ADMIN — RANOLAZINE 500 MILLIGRAM(S): 1000 TABLET, FILM COATED, EXTENDED RELEASE ORAL at 05:01

## 2024-12-07 RX ADMIN — Medication 5000 UNIT(S): at 21:35

## 2024-12-07 RX ADMIN — Medication 2: at 21:36

## 2024-12-07 RX ADMIN — Medication 2: at 17:42

## 2024-12-07 RX ADMIN — ACETAMINOPHEN 500MG 650 MILLIGRAM(S): 500 TABLET, COATED ORAL at 21:35

## 2024-12-07 RX ADMIN — RANOLAZINE 500 MILLIGRAM(S): 1000 TABLET, FILM COATED, EXTENDED RELEASE ORAL at 17:42

## 2024-12-07 RX ADMIN — IRON SUCROSE 110 MILLIGRAM(S): 20 INJECTION, SOLUTION INTRAVENOUS at 23:28

## 2024-12-07 RX ADMIN — FUROSEMIDE 40 MILLIGRAM(S): 40 TABLET ORAL at 05:01

## 2024-12-07 NOTE — PROGRESS NOTE ADULT - PROBLEM SELECTOR PLAN 1
-Multiple readmission this year  -[facility] on 11/19/2024 LVEDP 15  -Was having SOB outpatient despite additional lasix PO  -Volume status improved  -CXR on admission with congestion  -BNP 21K  -Diuresing well on IVP lasix  -C/w BB. Holding ARNI due to BRADY, to start post cath 12/9  -Would avoid MRA given hyperkalemia  -Eventual SGLT2 if eGFR > 30  -Eventual CRT-D consideration   -TTE with worsening EF    Decreasing frequency of IVP lasix today  Plan for cardio mems on 12/9

## 2024-12-07 NOTE — PROGRESS NOTE ADULT - SUBJECTIVE AND OBJECTIVE BOX
Patient is a 74y old  Female who presents with a chief complaint of Acute on Chronic HFrEF Exacerbation     INTERVAL HPI/OVERNIGHT EVENTS:   No acute events overnight. HD stable.   Cr although appears to have ticked mildly up, is at prior baseline   Denies any currant CP, palpitations SOB, abd pain, N/V     MEDICATIONS  (STANDING):  amLODIPine   Tablet 10 milliGRAM(s) Oral daily  aspirin enteric coated 81 milliGRAM(s) Oral daily  atorvastatin 80 milliGRAM(s) Oral at bedtime  clopidogrel Tablet 75 milliGRAM(s) Oral daily  dextrose 5%. 1000 milliLiter(s) (50 mL/Hr) IV Continuous <Continuous>  dextrose 5%. 1000 milliLiter(s) (100 mL/Hr) IV Continuous <Continuous>  dextrose 50% Injectable 25 Gram(s) IV Push once  dextrose 50% Injectable 12.5 Gram(s) IV Push once  dextrose 50% Injectable 25 Gram(s) IV Push once  furosemide   Injectable 40 milliGRAM(s) IV Push two times a day  gabapentin 300 milliGRAM(s) Oral at bedtime  glucagon  Injectable 1 milliGRAM(s) IntraMuscular once  heparin   Injectable 5000 Unit(s) SubCutaneous every 8 hours  insulin glargine Injectable (LANTUS) 26 Unit(s) SubCutaneous at bedtime  insulin lispro (ADMELOG) corrective regimen sliding scale   SubCutaneous Before meals and at bedtime  insulin lispro Injectable (ADMELOG) 5 Unit(s) SubCutaneous three times a day before meals  iron sucrose IVPB 200 milliGRAM(s) IV Intermittent every 24 hours  isosorbide   mononitrate ER Tablet (IMDUR) 60 milliGRAM(s) Oral daily  metoprolol succinate  milliGRAM(s) Oral daily  ranolazine 500 milliGRAM(s) Oral two times a day    MEDICATIONS  (PRN):  acetaminophen     Tablet .. 650 milliGRAM(s) Oral every 6 hours PRN Temp greater or equal to 38C (100.4F), Mild Pain (1 - 3)  aluminum hydroxide/magnesium hydroxide/simethicone Suspension 30 milliLiter(s) Oral every 4 hours PRN Dyspepsia  dextrose Oral Gel 15 Gram(s) Oral once PRN Blood Glucose LESS THAN 70 milliGRAM(s)/deciliter  melatonin 3 milliGRAM(s) Oral at bedtime PRN Insomnia  ondansetron Injectable 4 milliGRAM(s) IV Push every 8 hours PRN Nausea and/or Vomiting      Vital Signs Last 24 Hrs  T(C): 36.7 (07 Dec 2024 21:15), Max: 37.1 (07 Dec 2024 00:18)  T(F): 98 (07 Dec 2024 21:15), Max: 98.8 (07 Dec 2024 00:18)  HR: 74 (07 Dec 2024 21:15) (63 - 80)  BP: 129/75 (07 Dec 2024 21:15) (119/64 - 160/89)  BP(mean): 100 (07 Dec 2024 17:36) (100 - 102)  RR: 16 (07 Dec 2024 21:15) (16 - 18)  SpO2: 96% (07 Dec 2024 21:15) (95% - 98%)    Parameters below as of 07 Dec 2024 21:15  Patient On (Oxygen Delivery Method): room air          PHYSICAL EXAM:  GENERAL: NAD, well-groomed  NERVOUS SYSTEM:  Alert & Oriented X3, Good concentration; Motor Strength 5/5 B/L upper and lower extremities; DTRs 2+ intact and symmetric  CHEST/LUNG: Clear to percussion bilaterally; No rales, rhonchi, wheezing, or rubs  HEART: Regular rate and rhythm; No murmurs, rubs, or gallops  ABDOMEN: Soft, Nontender, Nondistended; Bowel sounds present  EXTREMITIES:  2+ Peripheral Pulses, No clubbing, cyanosis, or edema    LABS:                                   10.6   12.82 )-----------( 488      ( 07 Dec 2024 04:58 )             33.2   12-07    138  |  101  |  30.0[H]  ----------------------------<  182[H]  4.2   |  22.0  |  1.57[H]    Ca    8.7      07 Dec 2024 04:58    TPro  7.3  /  Alb  3.6  /  TBili  0.3[L]  /  DBili  x   /  AST  10  /  ALT  8   /  AlkPhos  82  12-07          Urinalysis Basic - ( 06 Dec 2024 04:58 )    Color: x / Appearance: x / SG: x / pH: x  Gluc: 179 mg/dL / Ketone: x  / Bili: x / Urobili: x   Blood: x / Protein: x / Nitrite: x   Leuk Esterase: x / RBC: x / WBC x   Sq Epi: x / Non Sq Epi: x / Bacteria: x      CAPILLARY BLOOD GLUCOSE    CAPILLARY BLOOD GLUCOSE      POCT Blood Glucose.: 205 mg/dL (07 Dec 2024 21:33)  POCT Blood Glucose.: 224 mg/dL (07 Dec 2024 17:32)  POCT Blood Glucose.: 271 mg/dL (07 Dec 2024 12:08)  POCT Blood Glucose.: 184 mg/dL (07 Dec 2024 08:43)    RADIOLOGY & ADDITIONAL TESTS:    TTE    1. Technically difficult image quality.   2. Left ventricular cavity is normal in size. Left ventricular systolic function is moderately decreased with an ejection fraction of 39 % by Taylor's method of disks with an ejection fraction visually estimated at 35 to 40 %.   3. Multiple segmental abnormalities exist. See findings.   4. Unable to assess left ventricular diastolic function due to insufficient data.   5. Normal right ventricular cavity size and normal right ventricular systolic function.   6. Left atrium is normal in size.   7. Mild to moderate mitral regurgitation.   8. Mild tricuspid regurgitation.   9. Estimated pulmonary artery systolic pressure is 38 mmHg, consistent with mild pulmonary hypertension.  10. Trileaflet aortic valve. Fibrocalcific aortic valve sclerosis without stenosis.  11. Trace aortic regurgitation.  12. No pericardial effusion seen.  13. Compared to the transthoracic echocardiogram performed on 10/8/2024, there is further decline in left ventricular function, from 45-50% to 35-40%.    ________________________________________________________________________________________    < end of copied text >        Consultant(s) Notes Reviewed:  [ ] YES  [ ] NO    Care Discussed with Consultants/Other Providers [ ] YES  [ ] NO

## 2024-12-07 NOTE — PROGRESS NOTE ADULT - ASSESSMENT
74F Maldivian-speaking ( # [id number]) history significant for HTN, DM2, CKD 3, heart block s/p Micra PPM, CVA, CAD/stent, PAD, CVA, HFmrEF with recurrent hospitalization for HF in 10/8-10/14 and 11/16-11/21/24 underwent repeat LHC on 11/19/24 with critical ISR of RCA stent 99% and 95% OM1 with 95% [id number] LAD no PCI perform yet for optimization of CHF, discharged on Lasix 40mg once daily with low dose Entresto had mild BRADY, interval with worsening dyspnea despite extra Lasix use prompted return to the ER and readmitted for ADHF,

## 2024-12-07 NOTE — PROGRESS NOTE ADULT - ASSESSMENT
74 F with PMHX CAD s/p PCI, HFrEF, Mobitz II s/p PPM (MD NereydaT), PAD with chronic right LE wound, CVA, DM2, CKD3, HTN presents to [facility] ER c/o shortness of breath/ARMIREZ and worsening LE edema admitted for Acute on Chronic HFrEF Exacerbation and Hyperkalemia.    # Aute Hypoxic Respiratory failure secondary to   # Acute on Chronic CHF wirh reduced EF   Still requiring 2LNC, though improved  - Daily weight, I/O, salt and fluid restriction  - Supplemental O2, wean as tolerated  - Furosemide 40mg IV q12  - ARNi on hold due to BRADY  - Metoprolol  - TTE reviewed  - Planned for PCI Monday with cardiomems and possible PPM upgrade (pending EP eval)  -   BRADY on CKD stage 3b    Hyperkalemia at presentation, Resolved  Based on prior renla inces, Cr Cl more or ess at baseline  Permissive Azotemia with diuretics acceptable for cardiac optimization   Avoid Nephrotoxins  Trend lytes     Anemia ( Iron deficiency)  Stable   Venofer 200 mg IVPB daily (2/3)  monitor Hgb  Outpt GI colonoscopy or screening     CAD s/p PCI,   Mobitz Type 2 s/p MicraPPM  DAPT, Statin, BB, Nitrate and antianginal    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

## 2024-12-07 NOTE — PROGRESS NOTE ADULT - SUBJECTIVE AND OBJECTIVE BOX
[facility]                                                         CARDIOLOGY AT [facility]                                                                  [address] [address]                                                         Telephone: [phone]. Fax:[phone]                                                                             PROGRESS NOTE    Reason for follow up: Aoc HFrEF, CAD  Update: Slight increase in creatinine       Review of symptoms:   Cardiac:  No chest pain. No dyspnea. No palpitations.  Respiratory: no cough. No dyspnea  Gastrointestinal: No diarrhea. No abdominal pain. No bleeding.   Neuro: No focal neuro complaints.    Vitals:  T(C): 36.7 (12-07-24 @ 08:08), Max: 37.1 (12-07-24 @ 00:18)  HR: 68 (12-07-24 @ 08:08) (63 - 68)  BP: 160/89 (12-07-24 @ 08:08) (119/64 - 160/89)  RR: 18 (12-07-24 @ 08:08) (18 - 18)  SpO2: 98% (12-07-24 @ 08:08) (95% - 98%)  Wt(kg): --  I&O's Summary    06 Dec 2024 07:01  -  07 Dec 2024 07:00  --------------------------------------------------------  IN: 552 mL / OUT: 3350 mL / NET: -2798 mL      Weight (kg): 68.5 (12-03 @ 15:29)    PHYSICAL EXAM:  Appearance: Comfortable. No acute distress  HEENT:  Atraumatic. Normocephalic.  Normal oral mucosa  Neurologic: A & O x 3, no gross focal deficits.  Cardiovascular: RRR S1 S2, No murmur, no rubs/gallops. No JVD  Respiratory: Lungs clear to auscultation, unlabored   Gastrointestinal:  Soft, Non-tender, + BS  Lower Extremities: 2+ Peripheral Pulses, No clubbing, cyanosis, + non pitting edema +ecchymosis  Psychiatry: Patient is calm. No agitation.   Skin: warm and dry.    CURRENT CARDIAC MEDICATIONS:  amLODIPine   Tablet 10 milliGRAM(s) Oral daily  furosemide   Injectable 40 milliGRAM(s) IV Push two times a day  isosorbide   mononitrate ER Tablet (IMDUR) 60 milliGRAM(s) Oral daily  metoprolol succinate  milliGRAM(s) Oral daily  ranolazine 500 milliGRAM(s) Oral two times a day      CURRENT OTHER MEDICATIONS:  acetaminophen     Tablet .. 650 milliGRAM(s) Oral every 6 hours PRN Temp greater or equal to 38C (100.4F), Mild Pain (1 - 3)  gabapentin 300 milliGRAM(s) Oral at bedtime  melatonin 3 milliGRAM(s) Oral at bedtime PRN Insomnia  ondansetron Injectable 4 milliGRAM(s) IV Push every 8 hours PRN Nausea and/or Vomiting  aluminum hydroxide/magnesium hydroxide/simethicone Suspension 30 milliLiter(s) Oral every 4 hours PRN Dyspepsia  atorvastatin 80 milliGRAM(s) Oral at bedtime  dextrose 50% Injectable 25 Gram(s) IV Push once, Stop order after: 1 Doses  dextrose 50% Injectable 12.5 Gram(s) IV Push once, Stop order after: 1 Doses  dextrose 50% Injectable 25 Gram(s) IV Push once, Stop order after: 1 Doses  dextrose Oral Gel 15 Gram(s) Oral once, Stop order after: 1 Doses PRN Blood Glucose LESS THAN 70 milliGRAM(s)/deciliter  glucagon  Injectable 1 milliGRAM(s) IntraMuscular once, Stop order after: 1 Doses  insulin glargine Injectable (LANTUS) 26 Unit(s) SubCutaneous at bedtime  insulin lispro (ADMELOG) corrective regimen sliding scale   SubCutaneous Before meals and at bedtime  insulin lispro Injectable (ADMELOG) 5 Unit(s) SubCutaneous three times a day before meals  aspirin enteric coated 81 milliGRAM(s) Oral daily  clopidogrel Tablet 75 milliGRAM(s) Oral daily  dextrose 5%. 1000 milliLiter(s) (50 mL/Hr) IV Continuous <Continuous>  dextrose 5%. 1000 milliLiter(s) (100 mL/Hr) IV Continuous <Continuous>  heparin   Injectable 5000 Unit(s) SubCutaneous every 8 hours  iron sucrose IVPB 200 milliGRAM(s) IV Intermittent every 24 hours, Stop order after: 3 Doses      LABS:	 	                            10.6   12.82 )-----------( 488      ( 07 Dec 2024 04:58 )             33.2     12-07    138  |  101  |  30.0[H]  ----------------------------<  182[H]  4.2   |  22.0  |  1.57[H]    Ca    8.7      07 Dec 2024 04:58    TPro  7.3  /  Alb  3.6  /  TBili  0.3[L]  /  DBili  x   /  AST  10  /  ALT  8   /  AlkPhos  82  12-07      Lipid Profile:   HgA1c:   TSH: Thyroid Stimulating Hormone, Serum: 1.47 uIU/mL      TELEMETRY: v paced       DIAGNOSTIC TESTING:  [ ] Echocardiogram:   < from: TTE W or WO Ultrasound Enhancing Agent (10.08.24 @ 15:15) >  CONCLUSIONS:      1. Left ventricular cavity is normal in size. Left ventricular systolic function is mildly decreased with an ejection fraction of 47 % by Taylor's method of disks with an ejection fraction visually estimated at 45 to 50 %. Regional wall motion abnormalities present.   2. Multiple segmental abnormalities exist. See findings.   3. Unable to assess left ventricular diastolic function due to insufficient data.   4. The rightventricle is not well visualized but probably normal right ventricular cavity size and normal right ventricular systolic function.   5. Left atrium is mildly dilated.   6. Mild tricuspid regurgitation.   7. Thickened mitral valve leaflets.   8. Thereis mild anterior calcification and mild posterior calcification of the mitral valve annulus.   9. Fibrocalcific aortic valve sclerosis without stenosis.  10. Estimated pulmonary artery systolic pressure is 45 mmHg.  11. No pericardial effusion seen.  12. Compared to the transthoracic echocardiogram performed on 11/25/2021, there are new regional wall motion abnormalities present and newly reduced LVEF.    < end of copied text >    [ ]  Catheterization:  < from: Cardiac Catheterization (11.19.24 @ 08:45) >  Diagnostic Conclusions:     1. Multivessel coronary artery disease with interval development of  diffuse, critical in-stent restenosis (up to 99%) in RCA. Stable  95% sequential lesions in both limbs of large branching OM1. Diffuse  severe (up to 95%) stenosis of the mid-apical LAD.    2. LVEDP 15 mmHg.      < end of copied text >

## 2024-12-08 LAB
ANION GAP SERPL CALC-SCNC: 13 MMOL/L — SIGNIFICANT CHANGE UP (ref 5–17)
BUN SERPL-MCNC: 33.7 MG/DL — HIGH (ref 8–20)
CALCIUM SERPL-MCNC: 9.1 MG/DL — SIGNIFICANT CHANGE UP (ref 8.4–10.5)
CHLORIDE SERPL-SCNC: 102 MMOL/L — SIGNIFICANT CHANGE UP (ref 96–108)
CO2 SERPL-SCNC: 21 MMOL/L — LOW (ref 22–29)
CREAT SERPL-MCNC: 1.63 MG/DL — HIGH (ref 0.5–1.3)
EGFR: 33 ML/MIN/1.73M2 — LOW
GLUCOSE BLDC GLUCOMTR-MCNC: 153 MG/DL — HIGH (ref 70–99)
GLUCOSE BLDC GLUCOMTR-MCNC: 164 MG/DL — HIGH (ref 70–99)
GLUCOSE BLDC GLUCOMTR-MCNC: 176 MG/DL — HIGH (ref 70–99)
GLUCOSE BLDC GLUCOMTR-MCNC: 260 MG/DL — HIGH (ref 70–99)
GLUCOSE SERPL-MCNC: 196 MG/DL — HIGH (ref 70–99)
HCT VFR BLD CALC: 31.9 % — LOW (ref 34.5–45)
HGB BLD-MCNC: 10.2 G/DL — LOW (ref 11.5–15.5)
MAGNESIUM SERPL-MCNC: 2.4 MG/DL — SIGNIFICANT CHANGE UP (ref 1.8–2.6)
MCHC RBC-ENTMCNC: 26.4 PG — LOW (ref 27–34)
MCHC RBC-ENTMCNC: 32 G/DL — SIGNIFICANT CHANGE UP (ref 32–36)
MCV RBC AUTO: 82.4 FL — SIGNIFICANT CHANGE UP (ref 80–100)
PLATELET # BLD AUTO: 451 K/UL — HIGH (ref 150–400)
POTASSIUM SERPL-MCNC: 4.4 MMOL/L — SIGNIFICANT CHANGE UP (ref 3.5–5.3)
POTASSIUM SERPL-SCNC: 4.4 MMOL/L — SIGNIFICANT CHANGE UP (ref 3.5–5.3)
RBC # BLD: 3.87 M/UL — SIGNIFICANT CHANGE UP (ref 3.8–5.2)
RBC # FLD: 18.6 % — HIGH (ref 10.3–14.5)
SODIUM SERPL-SCNC: 136 MMOL/L — SIGNIFICANT CHANGE UP (ref 135–145)
WBC # BLD: 10.56 K/UL — HIGH (ref 3.8–10.5)
WBC # FLD AUTO: 10.56 K/UL — HIGH (ref 3.8–10.5)

## 2024-12-08 PROCEDURE — 99233 SBSQ HOSP IP/OBS HIGH 50: CPT

## 2024-12-08 PROCEDURE — 99232 SBSQ HOSP IP/OBS MODERATE 35: CPT

## 2024-12-08 RX ORDER — SPIRONOLACTONE 25 MG
25 TABLET ORAL DAILY
Refills: 0 | Status: DISCONTINUED | OUTPATIENT
Start: 2024-12-08 | End: 2024-12-14

## 2024-12-08 RX ADMIN — Medication 1: at 09:25

## 2024-12-08 RX ADMIN — RANOLAZINE 500 MILLIGRAM(S): 1000 TABLET, FILM COATED, EXTENDED RELEASE ORAL at 05:03

## 2024-12-08 RX ADMIN — AMLODIPINE BESYLATE 10 MILLIGRAM(S): 10 TABLET ORAL at 05:03

## 2024-12-08 RX ADMIN — Medication 81 MILLIGRAM(S): at 12:42

## 2024-12-08 RX ADMIN — Medication 5000 UNIT(S): at 22:00

## 2024-12-08 RX ADMIN — ACETAMINOPHEN 500MG 650 MILLIGRAM(S): 500 TABLET, COATED ORAL at 22:18

## 2024-12-08 RX ADMIN — Medication 1: at 17:06

## 2024-12-08 RX ADMIN — Medication 5000 UNIT(S): at 05:02

## 2024-12-08 RX ADMIN — Medication 3: at 12:42

## 2024-12-08 RX ADMIN — Medication 1: at 22:01

## 2024-12-08 RX ADMIN — Medication 5 UNIT(S): at 12:43

## 2024-12-08 RX ADMIN — Medication 5 UNIT(S): at 09:26

## 2024-12-08 RX ADMIN — Medication 60 MILLIGRAM(S): at 12:42

## 2024-12-08 RX ADMIN — Medication 80 MILLIGRAM(S): at 22:00

## 2024-12-08 RX ADMIN — ACETAMINOPHEN 500MG 650 MILLIGRAM(S): 500 TABLET, COATED ORAL at 23:26

## 2024-12-08 RX ADMIN — GABAPENTIN 300 MILLIGRAM(S): 300 CAPSULE ORAL at 22:00

## 2024-12-08 RX ADMIN — METOPROLOL TARTRATE 100 MILLIGRAM(S): 100 TABLET, FILM COATED ORAL at 05:03

## 2024-12-08 RX ADMIN — Medication 5 UNIT(S): at 17:06

## 2024-12-08 RX ADMIN — INSULIN GLARGINE 26 UNIT(S): 100 INJECTION, SOLUTION SUBCUTANEOUS at 22:18

## 2024-12-08 RX ADMIN — FUROSEMIDE 40 MILLIGRAM(S): 40 TABLET ORAL at 05:02

## 2024-12-08 RX ADMIN — Medication 25 MILLIGRAM(S): at 12:42

## 2024-12-08 RX ADMIN — RANOLAZINE 500 MILLIGRAM(S): 1000 TABLET, FILM COATED, EXTENDED RELEASE ORAL at 17:06

## 2024-12-08 RX ADMIN — Medication 5000 UNIT(S): at 15:06

## 2024-12-08 RX ADMIN — CLOPIDOGREL 75 MILLIGRAM(S): 75 TABLET, FILM COATED ORAL at 12:42

## 2024-12-08 NOTE — PROGRESS NOTE ADULT - ASSESSMENT
74 F with PMHX CAD s/p PCI, HFrEF, Mobitz II s/p PPM (MD NereydaT), PAD with chronic right LE wound, CVA, DM2, CKD3, HTN presents to [facility] ER c/o shortness of breath/RAMIREZ and worsening LE edema admitted for Acute on Chronic HFrEF Exacerbation and Hyperkalemia.    # Aute Hypoxic Respiratory failure secondary to   # Acute on Chronic CHF wirh reduced EF   Still requiring 2LNC, though improved  - Daily weight, I/O, salt and fluid restriction  - Supplemental O2, wean as tolerated  - Furosemide 40mg IV q12 on hold for tomorrow (prior to cath to reduce risks of SALO)   - ARNi on hold due to BRADY  - Metoprolol  - TTE reviewed  - Planned for PCI Monday with cardiomems and possible PPM upgrade (pending EP eval)  -   BRADY on CKD stage 3b    Hyperkalemia at presentation, Resolved  Based on prior renla inces, Cr Cl more or ess at baseline  Permissive Azotemia with diuretics acceptable for cardiac optimization   Avoid Nephrotoxins  Trend lytes     Anemia ( Iron deficiency)  Stable   Venofer 200 mg IVPB daily (2/3)  monitor Hgb  Outpt GI colonoscopy or screening     CAD s/p PCI,   Mobitz Type 2 s/p MicraPPM  DAPT, Statin, BB, Nitrate and antianginal    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 Cath tomorrow

## 2024-12-08 NOTE — PROGRESS NOTE ADULT - PROBLEM SELECTOR PROBLEM 2
CAD S/P percutaneous coronary angioplasty
CAD (coronary artery disease)
CAD (coronary artery disease)
CAD S/P percutaneous coronary angioplasty

## 2024-12-08 NOTE — PROGRESS NOTE ADULT - SUBJECTIVE AND OBJECTIVE BOX
Patient is a 74y old  Female who presents with a chief complaint of Acute on Chronic HFrEF Exacerbation     INTERVAL HPI/OVERNIGHT EVENTS:   No acute events overnight. HD stable.   Cr marginally improved, suspect at baseline   Denies any currant CP, palpitations SOB, abd pain, N/V     MEDICATIONS  (STANDING):  amLODIPine   Tablet 10 milliGRAM(s) Oral daily  aspirin enteric coated 81 milliGRAM(s) Oral daily  atorvastatin 80 milliGRAM(s) Oral at bedtime  clopidogrel Tablet 75 milliGRAM(s) Oral daily  dextrose 5%. 1000 milliLiter(s) (50 mL/Hr) IV Continuous <Continuous>  dextrose 5%. 1000 milliLiter(s) (100 mL/Hr) IV Continuous <Continuous>  dextrose 50% Injectable 25 Gram(s) IV Push once  dextrose 50% Injectable 12.5 Gram(s) IV Push once  dextrose 50% Injectable 25 Gram(s) IV Push once  furosemide   Injectable 40 milliGRAM(s) IV Push two times a day  gabapentin 300 milliGRAM(s) Oral at bedtime  glucagon  Injectable 1 milliGRAM(s) IntraMuscular once  heparin   Injectable 5000 Unit(s) SubCutaneous every 8 hours  insulin glargine Injectable (LANTUS) 26 Unit(s) SubCutaneous at bedtime  insulin lispro (ADMELOG) corrective regimen sliding scale   SubCutaneous Before meals and at bedtime  insulin lispro Injectable (ADMELOG) 5 Unit(s) SubCutaneous three times a day before meals  iron sucrose IVPB 200 milliGRAM(s) IV Intermittent every 24 hours  isosorbide   mononitrate ER Tablet (IMDUR) 60 milliGRAM(s) Oral daily  metoprolol succinate  milliGRAM(s) Oral daily  ranolazine 500 milliGRAM(s) Oral two times a day    MEDICATIONS  (PRN):  acetaminophen     Tablet .. 650 milliGRAM(s) Oral every 6 hours PRN Temp greater or equal to 38C (100.4F), Mild Pain (1 - 3)  aluminum hydroxide/magnesium hydroxide/simethicone Suspension 30 milliLiter(s) Oral every 4 hours PRN Dyspepsia  dextrose Oral Gel 15 Gram(s) Oral once PRN Blood Glucose LESS THAN 70 milliGRAM(s)/deciliter  melatonin 3 milliGRAM(s) Oral at bedtime PRN Insomnia  ondansetron Injectable 4 milliGRAM(s) IV Push every 8 hours PRN Nausea and/or Vomiting      Vital Signs Last 24 Hrs  T(C): 36.7 (08 Dec 2024 20:34), Max: 36.7 (08 Dec 2024 00:35)  T(F): 98.1 (08 Dec 2024 20:34), Max: 98.1 (08 Dec 2024 20:34)  HR: 62 (08 Dec 2024 20:34) (61 - 74)  BP: 148/72 (08 Dec 2024 20:34) (134/69 - 148/72)  BP(mean): 92 (08 Dec 2024 16:07) (92 - 92)  RR: 18 (08 Dec 2024 20:34) (16 - 18)  SpO2: 96% (08 Dec 2024 20:34) (93% - 96%)    Parameters below as of 08 Dec 2024 20:34  Patient On (Oxygen Delivery Method): room air        Gen : Non toxic appearing, comfortable, NAD  HEENT : NCAT, MMM, No cervical lymphadenopathy, no JVD  CVS : S1S2, regular rate and rhythm, no murmurs  Resp : CTA b/l, no rhonchi or wheezes appreciated   Abd : Soft, non distended, non tender, BS +ve   MSK : No joint swelling or tenderness, no digital clubbing, no distal cyanosis, trace to +1 b/l feet edema   Neuro : CN II-XII intact, no focal motor or sensory deficits   Psych : Pleasant, no anxiety, normal affect    LABS:                                   10.2   10.56 )-----------( 451      ( 08 Dec 2024 04:40 )             31.9   12-08    136  |  102  |  33.7[H]  ----------------------------<  196[H]  4.4   |  21.0[L]  |  1.63[H]    Ca    9.1      08 Dec 2024 04:40  Mg     2.4     12-08    TPro  7.3  /  Alb  3.6  /  TBili  0.3[L]  /  DBili  x   /  AST  10  /  ALT  8   /  AlkPhos  82  12-07        Urinalysis Basic - ( 06 Dec 2024 04:58 )    Color: x / Appearance: x / SG: x / pH: x  Gluc: 179 mg/dL / Ketone: x  / Bili: x / Urobili: x   Blood: x / Protein: x / Nitrite: x   Leuk Esterase: x / RBC: x / WBC x   Sq Epi: x / Non Sq Epi: x / Bacteria: x      CAPILLARY BLOOD GLUCOSE    CAPILLARY BLOOD GLUCOSE      POCT Blood Glucose.: 205 mg/dL (07 Dec 2024 21:33)  POCT Blood Glucose.: 224 mg/dL (07 Dec 2024 17:32)  POCT Blood Glucose.: 271 mg/dL (07 Dec 2024 12:08)  POCT Blood Glucose.: 184 mg/dL (07 Dec 2024 08:43)    RADIOLOGY & ADDITIONAL TESTS:    TTE    1. Technically difficult image quality.   2. Left ventricular cavity is normal in size. Left ventricular systolic function is moderately decreased with an ejection fraction of 39 % by Taylor's method of disks with an ejection fraction visually estimated at 35 to 40 %.   3. Multiple segmental abnormalities exist. See findings.   4. Unable to assess left ventricular diastolic function due to insufficient data.   5. Normal right ventricular cavity size and normal right ventricular systolic function.   6. Left atrium is normal in size.   7. Mild to moderate mitral regurgitation.   8. Mild tricuspid regurgitation.   9. Estimated pulmonary artery systolic pressure is 38 mmHg, consistent with mild pulmonary hypertension.  10. Trileaflet aortic valve. Fibrocalcific aortic valve sclerosis without stenosis.  11. Trace aortic regurgitation.  12. No pericardial effusion seen.  13. Compared to the transthoracic echocardiogram performed on 10/8/2024, there is further decline in left ventricular function, from 45-50% to 35-40%.    ________________________________________________________________________________________    < end of copied text >        Consultant(s) Notes Reviewed:  [ ] YES  [ ] NO    Care Discussed with Consultants/Other Providers [ ] YES  [ ] NO

## 2024-12-08 NOTE — PROGRESS NOTE ADULT - ASSESSMENT
73 y/o female with PMH of  HTN, DM2, CKD 3, heart block s/p Micra PPM, CVA, CAD/stent, PAD, CVA, HFmrEF with recurrent hospitalization for HF in 10/8-10/14 and 11/16-11/21/24 underwent repeat LHC on 11/19/24 with critical ISR of RCA stent 99% and 95% OM1 with 95% cze-mo-vupftw LAD no PCI perform yet for optimization of CHF, discharged on Lasix 40mg once daily with low dose Entresto had mild BRADY, interval with worsening dyspnea despite extra Lasix use prompted return to the ER and readmitted for ADHF,  Echo EF 35%  Mild to moderate regurg

## 2024-12-08 NOTE — PROGRESS NOTE ADULT - SUBJECTIVE AND OBJECTIVE BOX
[facility]                                                         CARDIOLOGY AT [address] [address]                                                         Telephone: [phone]. Fax:[phone]                                                                             PROGRESS NOTE    Reason for follow up:   Update:       Review of symptoms:   Cardiac:  No chest pain. No dyspnea. No palpitations.  Respiratory: no cough. No dyspnea  Gastrointestinal: No diarrhea. No abdominal pain. No bleeding.   Neuro: No focal neuro complaints.      Vitals:  T(C): 36.7 (12-08-24 @ 08:19), Max: 36.7 (12-07-24 @ 17:36)  HR: 61 (12-08-24 @ 08:19) (61 - 80)  BP: 134/74 (12-08-24 @ 08:19) (129/75 - 151/78)  RR: 18 (12-08-24 @ 08:19) (16 - 18)  SpO2: 93% (12-08-24 @ 08:19) (93% - 97%)  Wt(kg): --  I&O's Summary    07 Dec 2024 07:01  -  08 Dec 2024 07:00  --------------------------------------------------------  IN: 784 mL / OUT: 1250 mL / NET: -466 mL    08 Dec 2024 07:01  -  08 Dec 2024 11:28  --------------------------------------------------------  IN: 180 mL / OUT: 600 mL / NET: -420 mL      Weight (kg): 68.5 (12-03 @ 15:29)      PHYSICAL EXAM:  Appearance: Comfortable. No acute distress  HEENT:  Atraumatic. Normocephalic.  Normal oral mucosa  Neurologic: A & O x 3, no gross focal deficits.  Cardiovascular: RRR S1 S2, 2/6 melvin lsb  Respiratory: Lungs clear to auscultation, unlabored   Gastrointestinal:  Soft, Non-tender, + BS  Lower Extremities: No edema  Psychiatry: Patient is calm. No agitation.   Skin: warm and dry.      CURRENT MEDICATIONS:  MEDICATIONS  (STANDING):  amLODIPine   Tablet 10 milliGRAM(s) Oral daily  aspirin enteric coated 81 milliGRAM(s) Oral daily  atorvastatin 80 milliGRAM(s) Oral at bedtime  clopidogrel Tablet 75 milliGRAM(s) Oral daily  furosemide   Injectable 40 milliGRAM(s) IV Push daily  gabapentin 300 milliGRAM(s) Oral at bedtime  glucagon  Injectable 1 milliGRAM(s) IntraMuscular once  heparin   Injectable 5000 Unit(s) SubCutaneous every 8 hours  insulin glargine Injectable (LANTUS) 26 Unit(s) SubCutaneous at bedtime  insulin lispro (ADMELOG) corrective regimen sliding scale   SubCutaneous Before meals and at bedtime  insulin lispro Injectable (ADMELOG) 5 Unit(s) SubCutaneous three times a day before meals  isosorbide   mononitrate ER Tablet (IMDUR) 60 milliGRAM(s) Oral daily  metoprolol succinate  milliGRAM(s) Oral daily  ranolazine 500 milliGRAM(s) Oral two times a day  spironolactone 25 milliGRAM(s) Oral daily          LABS:	 	                            10.2   10.56 )-----------( 451      ( 08 Dec 2024 04:40 )             31.9     12-08    136  |  102  |  33.7[H]  ----------------------------<  196[H]  4.4   |  21.0[L]  |  1.63[H]    Ca    9.1      08 Dec 2024 04:40  Mg     2.4     12-08    TPro  7.3  /  Alb  3.6  /  TBili  0.3[L]  /  DBili  x   /  AST  10  /  ALT  8   /  AlkPhos  82  12-07    proBNP:   Lipid Profile:   HgA1c:   TSH: Thyroid Stimulating Hormone, Serum: 1.47 uIU/mL        TELEMETRY:   ECG:  	      DIAGNOSTIC TESTING:  [ ] Echocardiogram:   < from: TTE W or WO Ultrasound Enhancing Agent (12.05.24 @ 21:37) >   1. Technically difficult image quality.   2. Left ventricular cavity is normal in size. Left ventricular systolic function is moderately decreased with an ejection fraction of 39 % by Taylor's method of disks with an ejection fraction visually estimated at 35 to 40 %.   3. Multiple segmental abnormalities exist. See findings.   4. Unable to assess left ventricular diastolic function due to insufficient data.   5. Normal right ventricular cavity size and normal right ventricular systolic function.   6. Left atrium is normal in size.   7. Mild to moderate mitral regurgitation.   8. Mild tricuspid regurgitation.   9. Estimated pulmonary artery systolic pressure is 38 mmHg, consistent with mild pulmonary hypertension.  10. Trileaflet aortic valve. Fibrocalcific aortic valve sclerosis without stenosis.  11. Trace aortic regurgitation.  12. No pericardial effusion seen.  13. Compared to the transthoracic echocardiogram performed on 10/8/2024, there is further decline in left ventricular function, from 45-50% to 35-40%.    < end of copied text >    [ ]  Catheterization:  [ ] Stress Test:    OTHER:

## 2024-12-08 NOTE — PROGRESS NOTE ADULT - PROBLEM SELECTOR PLAN 2
.  - s/p [facility] 11/19/2024  - [facility] on 11/19/24 with critical ISR of RCA stent 99% and 95% OM1 with 95% bki-tu-kschwn LAD no PCI perform yet for optimization of CHF  -discussed with interventionalist given recurrent ADHF admissions warrant for PCI attempt of the RCA,   - will continue to optimize, if HB remains <8 to transfuse PRBC x2 units prior to PCI. anemia likely of chronic disease  - Currently not optimized but will re-evaluate for PCI possibly 12/9   - continue amlodipine, metoprolol, Imdur and Ranexa, DAPT
-Known critical RCA stenosis from last month  -C/w DAPT, statin, Imdur, Ranexa    Plan for staged RCA on Monday 12/9.
For [facility] cath tomorrow for isr of RCA  Creat is at baseline  soft hydration prior to cath  hold lasix tomorrow    Medications  amLODIPine   Tablet 10 milliGRAM(s) Oral daily  aspirin enteric coated 81 milliGRAM(s) Oral daily  atorvastatin 80 milliGRAM(s) Oral at bedtime  clopidogrel Tablet 75 milliGRAM(s) Oral daily  furosemide   Injectable 40 milliGRAM(s) IV Push daily  isosorbide   mononitrate ER Tablet (IMDUR) 60 milliGRAM(s) Oral daily  metoprolol succinate  milliGRAM(s) Oral daily  ranolazine 500 milliGRAM(s) Oral two times a day  spironolactone 25 milliGRAM(s) Oral daily
-Known critical RCA stenosis from last month  -C/w DAPT, statin, Imdur, Ranexa  -Plan for staged RCA on Monday 12/9

## 2024-12-08 NOTE — PROGRESS NOTE ADULT - PROBLEM SELECTOR PLAN 1
-Low na diet  - Keep k >4 and mg >2  Recurrent admission for CHF Cardiomens is planned this admit      GDMT  Beta blocker Metoprolol  Diuretic hold lasix in the am (On lasix 40mg ivp )  ARNI start entresto after cath and consider d/cing norvasc  MRa Spirolactone added today  consider farxiga out patient

## 2024-12-09 LAB
ANION GAP SERPL CALC-SCNC: 16 MMOL/L — SIGNIFICANT CHANGE UP (ref 5–17)
BUN SERPL-MCNC: 39.8 MG/DL — HIGH (ref 8–20)
CALCIUM SERPL-MCNC: 9.1 MG/DL — SIGNIFICANT CHANGE UP (ref 8.4–10.5)
CHLORIDE SERPL-SCNC: 104 MMOL/L — SIGNIFICANT CHANGE UP (ref 96–108)
CO2 SERPL-SCNC: 19 MMOL/L — LOW (ref 22–29)
CREAT SERPL-MCNC: 1.6 MG/DL — HIGH (ref 0.5–1.3)
EGFR: 34 ML/MIN/1.73M2 — LOW
GLUCOSE BLDC GLUCOMTR-MCNC: 152 MG/DL — HIGH (ref 70–99)
GLUCOSE BLDC GLUCOMTR-MCNC: 173 MG/DL — HIGH (ref 70–99)
GLUCOSE BLDC GLUCOMTR-MCNC: 225 MG/DL — HIGH (ref 70–99)
GLUCOSE BLDC GLUCOMTR-MCNC: 232 MG/DL — HIGH (ref 70–99)
GLUCOSE SERPL-MCNC: 259 MG/DL — HIGH (ref 70–99)
HCT VFR BLD CALC: 32.3 % — LOW (ref 34.5–45)
HGB BLD-MCNC: 9.9 G/DL — LOW (ref 11.5–15.5)
MCHC RBC-ENTMCNC: 26.3 PG — LOW (ref 27–34)
MCHC RBC-ENTMCNC: 30.7 G/DL — LOW (ref 32–36)
MCV RBC AUTO: 85.7 FL — SIGNIFICANT CHANGE UP (ref 80–100)
PLATELET # BLD AUTO: 472 K/UL — HIGH (ref 150–400)
POTASSIUM SERPL-MCNC: 4.7 MMOL/L — SIGNIFICANT CHANGE UP (ref 3.5–5.3)
POTASSIUM SERPL-SCNC: 4.7 MMOL/L — SIGNIFICANT CHANGE UP (ref 3.5–5.3)
RBC # BLD: 3.77 M/UL — LOW (ref 3.8–5.2)
RBC # FLD: 19.3 % — HIGH (ref 10.3–14.5)
SODIUM SERPL-SCNC: 139 MMOL/L — SIGNIFICANT CHANGE UP (ref 135–145)
WBC # BLD: 10.38 K/UL — SIGNIFICANT CHANGE UP (ref 3.8–10.5)
WBC # FLD AUTO: 10.38 K/UL — SIGNIFICANT CHANGE UP (ref 3.8–10.5)

## 2024-12-09 PROCEDURE — 99152 MOD SED SAME PHYS/QHP 5/>YRS: CPT

## 2024-12-09 PROCEDURE — 99232 SBSQ HOSP IP/OBS MODERATE 35: CPT

## 2024-12-09 PROCEDURE — 33289 TCAT IMPL WRLS P-ART PRS SNR: CPT

## 2024-12-09 PROCEDURE — 99232 SBSQ HOSP IP/OBS MODERATE 35: CPT | Mod: 57

## 2024-12-09 RX ADMIN — CLOPIDOGREL 75 MILLIGRAM(S): 75 TABLET, FILM COATED ORAL at 12:25

## 2024-12-09 RX ADMIN — Medication 2: at 21:32

## 2024-12-09 RX ADMIN — Medication 80 MILLIGRAM(S): at 21:32

## 2024-12-09 RX ADMIN — METOPROLOL TARTRATE 100 MILLIGRAM(S): 100 TABLET, FILM COATED ORAL at 05:33

## 2024-12-09 RX ADMIN — RANOLAZINE 500 MILLIGRAM(S): 1000 TABLET, FILM COATED, EXTENDED RELEASE ORAL at 05:33

## 2024-12-09 RX ADMIN — RANOLAZINE 500 MILLIGRAM(S): 1000 TABLET, FILM COATED, EXTENDED RELEASE ORAL at 18:57

## 2024-12-09 RX ADMIN — Medication 25 MILLIGRAM(S): at 05:33

## 2024-12-09 RX ADMIN — Medication 81 MILLIGRAM(S): at 12:26

## 2024-12-09 RX ADMIN — Medication 5000 UNIT(S): at 21:32

## 2024-12-09 RX ADMIN — GABAPENTIN 300 MILLIGRAM(S): 300 CAPSULE ORAL at 21:31

## 2024-12-09 RX ADMIN — Medication 60 MILLIGRAM(S): at 12:25

## 2024-12-09 RX ADMIN — Medication 1: at 12:29

## 2024-12-09 RX ADMIN — AMLODIPINE BESYLATE 10 MILLIGRAM(S): 10 TABLET ORAL at 05:33

## 2024-12-09 RX ADMIN — INSULIN GLARGINE 26 UNIT(S): 100 INJECTION, SOLUTION SUBCUTANEOUS at 21:32

## 2024-12-09 RX ADMIN — Medication 2: at 08:20

## 2024-12-09 NOTE — CHART NOTE - NSCHARTNOTEFT_GEN_A_CORE
Nurse Practitioner Progress note:   s/p RHC and CardioMems by Dr Bajwa  [facility] not done. Films from cath in October 2024 reviewed and revealed heavily calcified coronaries. More planning and assessment as OP recommended.    RA pressures: 10/5/3  RV pressures: 30/2/4  PA pressures: 29/14/17  PCWP:  13/13/11  PA Pressures 28/11/18  PA sat 58.8%  AO sat 95%    CO/CO:  3.7/2.19      Patient feels well.  Denies chest pain, shortness of breath, dizziness or palpitations at this time    Right groin procedure site CDI.  no bleeding, no hematoma, site soft, non tender, positive pedal pulses bilaterally      T(C): 36.8 (12-09-24 @ 14:30), Max: 36.8 (12-09-24 @ 00:00)  HR: 77 (12-09-24 @ 17:00) (62 - 77)  BP: 145/78 (12-09-24 @ 16:50) (118/61 - 159/82)  RR: 17 (12-09-24 @ 17:00) (16 - 18)  SpO2: 97% (12-09-24 @ 17:00) (96% - 99%)        HPI:  Pt seen/examined prior to midnight on 12/3/24.    74F with PMHX CAD s/p PCI, HFrEF, Mobitz II s/p PPM (Nereyda, MDT), PAD with chronic right LE wound, CVA, DM2, CKD3, HTN presents to [facility] ER c/o shortness of breath/RAMIREZ and worsening LE edema. ROS +nonradiating CP and nonproductive cough. Pt recently admitted for decompensated CHF and was doing well until she noted increased weight gain and called her Cardiologist Dr Allen who advised her to come to ER for further evaluation. Pt normally takes Lasix 20mg daily but took 40mg today PTA with improvement. CXR with congestive findings and mild effusions bilaterally. Crackles and pitting edema on exam. Labs notable for leukocytosis, CKD, and hyperkalemia.     ROS negative unless mentioned. (03 Dec 2024 23:51)    now s/p RHC and Cardiomems placement        ASSESSMENT/PLAN:    CHF reduced EF  - sp Cardiomems  -Recover patient for 3 hours  -Figure 8 stitch to be removed by Interventional NP in the a,  -Resume cardiac diet  -Resume prior activity level  -Resume all medications  -Plan of care discussed with patient, family and MD

## 2024-12-09 NOTE — PROGRESS NOTE ADULT - ASSESSMENT
73 y/o female with PMH of  HTN, DM2, CKD 3, heart block s/p Micra PPM, CVA, CAD/stent, PAD, CVA, HFmrEF with recurrent hospitalization for HF in 10/8-10/14 and 11/16-11/21/24 underwent repeat LHC on 11/19/24 with critical ISR of RCA stent 99% and 95% OM1 with 95% [id number] LAD no PCI perform yet for optimization of CHF, discharged on Lasix 40mg once daily with low dose Entresto had mild BRADY, interval with worsening dyspnea despite extra Lasix use prompted return to the ER and readmitted for ADHF,  Echo EF 35%  Mild to moderate regurg

## 2024-12-09 NOTE — PROGRESS NOTE ADULT - SUBJECTIVE AND OBJECTIVE BOX
[facility]                                                         CARDIOLOGY AT [facility]                                                                  [address] [address]                                                         Telephone: [phone]. Fax:[phone]                                                                             PROGRESS NOTE    Reason for follow up:  [place] heart failure   Update:   Pending [facility] today with Cardiomemos      Review of symptoms:   Cardiac:  No chest pain. No dyspnea. No palpitations.  Respiratory: no cough. No dyspnea  Gastrointestinal: No diarrhea. No abdominal pain. No bleeding.   Neuro: No focal neuro complaints.    Vitals:  T(C): 36.5 (12-09-24 @ 08:25), Max: 36.8 (12-09-24 @ 00:00)  HR: 70 (12-09-24 @ 12:32) (62 - 72)  BP: 158/77 (12-09-24 @ 12:32) (118/61 - 159/82)  RR: 18 (12-09-24 @ 08:25) (16 - 18)  SpO2: 98% (12-09-24 @ 09:02) (96% - 98%)  I&O's Summary  08 Dec 2024 07:01  -  09 Dec 2024 07:00  --------------------------------------------------------  IN: 402 mL / OUT: 1800 mL / NET: -1398 mL    PHYSICAL EXAM:  Appearance: Comfortable. No acute distress  HEENT:  Atraumatic. Normocephalic.  Normal oral mucosa  Neurologic: A & O x 3, no gross focal deficits.  Cardiovascular: RRR S1 S2, No murmur, no rubs/gallops. No JVD  Respiratory: Lungs clear to auscultation, unlabored   Gastrointestinal:  Soft, Non-tender, + BS  Lower Extremities: 2+ Peripheral Pulses, No clubbing, cyanosis, or edema  Psychiatry: Patient is calm. No agitation.   Skin: warm and dry.    CURRENT CARDIAC MEDICATIONS:  amLODIPine   Tablet 10 milliGRAM(s) Oral daily  isosorbide   mononitrate ER Tablet (IMDUR) 60 milliGRAM(s) Oral daily  metoprolol succinate  milliGRAM(s) Oral daily  ranolazine 500 milliGRAM(s) Oral two times a day  spironolactone 25 milliGRAM(s) Oral daily    CURRENT OTHER MEDICATIONS:  acetaminophen     Tablet .. 650 milliGRAM(s) Oral every 6 hours PRN Temp greater or equal to 38C (100.4F), Mild Pain (1 - 3)  gabapentin 300 milliGRAM(s) Oral at bedtime  melatonin 3 milliGRAM(s) Oral at bedtime PRN Insomnia  ondansetron Injectable 4 milliGRAM(s) IV Push every 8 hours PRN Nausea and/or Vomiting  aluminum hydroxide/magnesium hydroxide/simethicone Suspension 30 milliLiter(s) Oral every 4 hours PRN Dyspepsia  atorvastatin 80 milliGRAM(s) Oral at bedtime  dextrose 50% Injectable 25 Gram(s) IV Push once, Stop order after: 1 Doses  dextrose 50% Injectable 12.5 Gram(s) IV Push once, Stop order after: 1 Doses  dextrose 50% Injectable 25 Gram(s) IV Push once, Stop order after: 1 Doses  dextrose Oral Gel 15 Gram(s) Oral once, Stop order after: 1 Doses PRN Blood Glucose LESS THAN 70 milliGRAM(s)/deciliter  glucagon  Injectable 1 milliGRAM(s) IntraMuscular once, Stop order after: 1 Doses  insulin glargine Injectable (LANTUS) 26 Unit(s) SubCutaneous at bedtime  insulin lispro (ADMELOG) corrective regimen sliding scale   SubCutaneous Before meals and at bedtime  insulin lispro Injectable (ADMELOG) 5 Unit(s) SubCutaneous three times a day before meals  aspirin enteric coated 81 milliGRAM(s) Oral daily  clopidogrel Tablet 75 milliGRAM(s) Oral daily  heparin   Injectable 5000 Unit(s) SubCutaneous every 8 hours    LABS:	 	                        9.9    10.38 )-----------( 472      ( 09 Dec 2024 04:11 )             32.3     12-09    139  |  104  |  39.8[H]  ----------------------------<  259[H]  4.7   |  19.0[L]  |  1.60[H]    Ca    9.1      09 Dec 2024 04:11  Mg     2.4     12-08    TSH: Thyroid Stimulating Hormone, Serum: 1.47 uIU/mL    TELEMETRY:  V paced

## 2024-12-09 NOTE — PROGRESS NOTE ADULT - SUBJECTIVE AND OBJECTIVE BOX
SHIREEN CASH    [id number]    74y      Female    Patient is a 74y old  Female who presents with a chief complaint of Acute on Chronic HFrEF Exacerbation (09 Dec 2024 13:06)      INTERVAL HPI/OVERNIGHT EVENTS:    Patient is doing ok, she denies fever, chills, chest pain, sob, dizziness     Vital Signs Last 24 Hrs  T(C): 36.5 (09 Dec 2024 08:25), Max: 36.8 (09 Dec 2024 00:00)  T(F): 97.7 (09 Dec 2024 08:25), Max: 98.3 (09 Dec 2024 00:00)  HR: 70 (09 Dec 2024 12:32) (62 - 72)  BP: 158/77 (09 Dec 2024 12:32) (118/61 - 159/82)  BP(mean): 100 (09 Dec 2024 08:25) (80 - 100)  RR: 18 (09 Dec 2024 08:25) (16 - 18)  SpO2: 98% (09 Dec 2024 09:02) (96% - 98%)    Parameters below as of 09 Dec 2024 08:25  Patient On (Oxygen Delivery Method): room air        PHYSICAL EXAM:    GENERAL: Elderly female looking comfortable   HEENT: PERRL, +EOMI  NECK: soft, Supple, No JVD  CHEST/LUNG: Clear to auscultate bilaterally; No wheezing  HEART: S1S2+, Regular rate and rhythm; No murmurs  ABDOMEN: Soft, Nontender, Nondistended; Bowel sounds present  EXTREMITIES:  1+ Peripheral Pulses, No edema  SKIN: ecchymosis of the right foot   NEURO: AAOX3  PSYCH: normal mood      LABS:                        9.9    10.38 )-----------( 472      ( 09 Dec 2024 04:11 )             32.3     12-09    139  |  104  |  39.8[H]  ----------------------------<  259[H]  4.7   |  19.0[L]  |  1.60[H]    Ca    9.1      09 Dec 2024 04:11  Mg     2.4     12-08        I&O's Summary    08 Dec 2024 07:01  -  09 Dec 2024 07:00  --------------------------------------------------------  IN: 402 mL / OUT: 1800 mL / NET: -1398 mL    09 Dec 2024 07:01  -  09 Dec 2024 13:26  --------------------------------------------------------  IN: 0 mL / OUT: 400 mL / NET: -400 mL        MEDICATIONS  (STANDING):  amLODIPine   Tablet 10 milliGRAM(s) Oral daily  aspirin enteric coated 81 milliGRAM(s) Oral daily  atorvastatin 80 milliGRAM(s) Oral at bedtime  clopidogrel Tablet 75 milliGRAM(s) Oral daily  dextrose 5%. 1000 milliLiter(s) (50 mL/Hr) IV Continuous <Continuous>  dextrose 5%. 1000 milliLiter(s) (100 mL/Hr) IV Continuous <Continuous>  dextrose 50% Injectable 25 Gram(s) IV Push once  dextrose 50% Injectable 12.5 Gram(s) IV Push once  dextrose 50% Injectable 25 Gram(s) IV Push once  gabapentin 300 milliGRAM(s) Oral at bedtime  glucagon  Injectable 1 milliGRAM(s) IntraMuscular once  heparin   Injectable 5000 Unit(s) SubCutaneous every 8 hours  insulin glargine Injectable (LANTUS) 26 Unit(s) SubCutaneous at bedtime  insulin lispro (ADMELOG) corrective regimen sliding scale   SubCutaneous Before meals and at bedtime  insulin lispro Injectable (ADMELOG) 5 Unit(s) SubCutaneous three times a day before meals  isosorbide   mononitrate ER Tablet (IMDUR) 60 milliGRAM(s) Oral daily  metoprolol succinate  milliGRAM(s) Oral daily  ranolazine 500 milliGRAM(s) Oral two times a day  spironolactone 25 milliGRAM(s) Oral daily    MEDICATIONS  (PRN):  acetaminophen     Tablet .. 650 milliGRAM(s) Oral every 6 hours PRN Temp greater or equal to 38C (100.4F), Mild Pain (1 - 3)  aluminum hydroxide/magnesium hydroxide/simethicone Suspension 30 milliLiter(s) Oral every 4 hours PRN Dyspepsia  dextrose Oral Gel 15 Gram(s) Oral once PRN Blood Glucose LESS THAN 70 milliGRAM(s)/deciliter  melatonin 3 milliGRAM(s) Oral at bedtime PRN Insomnia  ondansetron Injectable 4 milliGRAM(s) IV Push every 8 hours PRN Nausea and/or Vomiting

## 2024-12-09 NOTE — CONSULT NOTE ADULT - NS ATTEND AMEND GEN_ALL_CORE FT
I have seen and examined the patient and agree with the assessment and plan as written. Patient with HFrEF with trouble with multiple admissions with HF needing a CardioMEMS.

## 2024-12-09 NOTE — CONSULT NOTE ADULT - ASSESSMENT
74F with PMHX CAD s/p PCI, HFrEF, Mobitz II s/p PPM (MD NereydaT), PAD with chronic right LE wound, CVA, DM2, CKD3, HTN presents to [facility] ER c/o shortness of breath/RAMIREZ and worsening LE edema. ROS +nonradiating CP and nonproductive cough. Pt recently admitted for decompensated CHF and was doing well until she noted increased weight gain and called her Cardiologist Dr Allen who advised her to come to ER for further evaluation. Pt normally takes Lasix 20mg daily but took 40mg today PTA with improvement. CXR with congestive findings and mild effusions bilaterally. Crackles and pitting edema on exam. Labs notable for leukocytosis, CKD, and hyperkalemia.     HF w/ reduced EF<40% (sHF), and could be biventricular heart failure with reduced left ventricular function     Risk Stratification:  ASA: 3  Mallampati:  Bleeding Risk: 7.6%  Creatinine: 2.4  GFR: 34

## 2024-12-09 NOTE — CONSULT NOTE ADULT - PROBLEM SELECTOR RECOMMENDATION 9
Plan/Recommendations:   -plan for L&RHC and Cardiomems placement  -preferred access: RFA/RFV  -patient seen and examined  -confirmed appropriate NPO duration  -ECG and Labs reviewed  -Aspirin 81mg po pre-cath  -No NS bolus secondary to HF   -procedure discussed with patient; risks and benefits explained, questions answered  -Informed consent obtained by attending IC      Risks, benefits, and alternatives reviewed.  Risks including but not limited to MI, death, stroke, bleeding, infection, vessel injury, hematoma, renal failure, allergic reaction, urgent open heart surgery, restenosis and stent thrombosis were reviewed.  All questions answered.  Patient is agreeable to proceed.
Admit to Tele and trend serial Trops, CBC, CMP, TFTs, LFTs, UA, pro-BNP: [id number]   - ECG w/o ischemia, MI or dysrhythmias  - HF w/ reduced EF<40% (sHF), and could be biventricular heart failure with reduced left ventricular function   - etiologies include CAD, HTN, Valvular, metabolic, and renovascular  - TTE in AM to assess systolic & diastolic function and wall motion abnormalities  - control volume overload w/ diuretics: Lasix 40mg IV 2x daily  - Strict I's & O's, daily weights, low Na diet  GDMT for HF w/ reduced EF: Loop diuretics Lasix, Metoprolol, Valsartan  - avoid CCB in patients w/ low EF (if needed on board only Norvasc safest)  - lifestyle modifications, control DM, CAD, dysrhythmias, Thyroid disease, weight reduction, daily activities  - avoid meds like NSAIDs, Metformin, CCBs, Alpha agonists, antidepressants that lead to worse HF

## 2024-12-09 NOTE — PROGRESS NOTE ADULT - ASSESSMENT
74 F with PMHX CAD s/p PCI, HFrEF, Mobitz II s/p PPM (MD NereydaT), PAD with chronic right LE wound, CVA, DM2, CKD3, HTN presents to [facility] ER c/o shortness of breath/RAMIREZ and worsening LE edema admitted for Acute on Chronic HFrEF Exacerbation and Hyperkalemia.    Plan:       # Aute Hypoxic Respiratory failure secondary to   # Acute on Chronic CHF wirh reduced EF   Still requiring 2LNC, though improved  - Daily weight, I/O, salt and fluid restriction  - Supplemental O2, wean as tolerated  - Furosemide 40mg IV q12 on hold   - ARNi on hold due to BRADY  - Metoprolol  - TTE reviewed  - Planned for PCI today with cardiomems and possible PPM upgrade (pending EP eval)       BRADY on CKD stage 3b    Hyperkalemia at presentation, Resolved  Based on prior renla inces, Cr Cl more or ess at baseline  Permissive Azotemia with diuretics acceptable for cardiac optimization   Avoid Nephrotoxins  Trend lytes     Anemia ( Iron deficiency)  Stable   Venofer 200 mg IVPB daily (2/3)  monitor Hgb  Outpt GI colonoscopy or screening     CAD s/p PCI,   Mobitz Type 2 s/p MicraPPM  DAPT, Statin, BB, Nitrate and antianginal  going for PCA    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 Cath today

## 2024-12-09 NOTE — PROGRESS NOTE ADULT - PROBLEM SELECTOR PLAN 1
Euvolemic on exam  Low na diet  Keep k >4 and mg >2  Recurrent admission for CHF Cardiomemo and LHC today pending  Monitor on telemetry.  Strict i/o and daily weights.  Keep K > 4, Mg > 2.  Monitor renal function with ongoing diuresis.  GDMT  Beta blocker Metoprolol  Diuretic hold Lasix  for LHC.    ARNI start Entresto after cath and consider d/cing Norvasc  Ct - MRa Spirolactone  Consider farxiga out patient.  Monitor on telemetry.  Strict i/o and daily weights.  Keep K > 4, Mg > 2.  Monitor renal function with ongoing diuresis.

## 2024-12-09 NOTE — CONSULT NOTE ADULT - SUBJECTIVE AND OBJECTIVE BOX
[facility]                                              INTERVENTIONAL CARDIOLOGY AT [address] [address]                                             Telephone: [phone]. Fax:[phone]                                                       INTERVENTIONAL CARDIOLOGY CONSULTATION NOTE                                                                                             History obtained by: Patient and medical record  Community Cardiologist: Dr Allen  Reason for Consultation: Evaluation for cardiac catheterization and CardioMems placement  Available pt records reviewed: Yes [ x ] No [  ]    Chief complaint:    Patient is a 74y old  Female who presents with a chief complaint of Acute on Chronic HFrEF Exacerbation (08 Dec 2024 22:46)      HPI:  Pt seen/examined prior to midnight on 12/3/24.    74F with PMHX CAD s/p PCI, HFrEF, Mobitz II s/p PPM (Nereyda, MDT), PAD with chronic right LE wound, CVA, DM2, CKD3, HTN presents to [facility] ER c/o shortness of breath/RAMIREZ and worsening LE edema. ROS +nonradiating CP and nonproductive cough. Pt recently admitted for decompensated CHF and was doing well until she noted increased weight gain and called her Cardiologist Dr Allen who advised her to come to ER for further evaluation. Pt normally takes Lasix 20mg daily but took 40mg today PTA with improvement. CXR with congestive findings and mild effusions bilaterally. Crackles and pitting edema on exam. Labs notable for leukocytosis, CKD, and hyperkalemia.     ROS negative unless mentioned. (03 Dec 2024 23:51)      Anginal Class:        Angina (Class): 3       Ischemic Symptoms: SOB    Heart Failure:        Systolic/Diastolic/Combined: Systolic       NYHA Class (within 2 weeks):       PAST MEDICAL HISTORY  DM (diabetes mellitus)    HTN (hypertension)    Acute otitis media, unspecified otitis media type    H/O gastroesophageal reflux (GERD)    Cardiac pacemaker    CVA (cerebrovascular accident)    CVA (cerebrovascular accident)    PAD (peripheral artery disease)    Wound of right leg        Associated Risk Factors:        Frailty Assessment: (none/mild/mod/severe):       Cerebrovascular Disease: N/A       Chronic Lung Disease: N/A       Peripheral Arterial Disease: N/A       Chronic Kidney Disease (if yes, what is GFR): N/A       Uncontrolled Diabetes (if yes, what is HgbA1C or FBS): N/A       Poorly Controlled Hypertension (if yes, what is SBP): N/A       Morbid Obesity (if yes, what is BMI): N/A       History of Recent Ventricular Arrhythmia: N/A       Inability to Ambulate Safely: N/A       Need for Therapeutic Anticoagulation: N/A       Antiplatelet or Contrast Allergy: N/A      PAST SURGICAL HISTORY  History of benign brain tumor    Cataract    History of biopsy    Cardiac pacemaker    S/P angiogram of extremity        SOCIAL HISTORY:  Denies smoking/alcohol/drugs    FAMILY HISTORY:  FH: asthma  Son      Family History of Premature Cardiovascular Disease:  Yes [  ] No [  ]    HOME MEDICATIONS:  amLODIPine 10 mg oral tablet: 1 tab(s) orally once a day (04 Dec 2024 01:16)  ferrous sulfate 325 mg (65 mg elemental iron) oral tablet: 1 tab(s) orally 3 times a day (04 Dec 2024 01:16)  gabapentin 300 mg oral capsule: 1 cap(s) orally once a day (at bedtime) (04 Dec 2024 01:16)  insulin glargine 100 units/mL subcutaneous solution: 26 unit(s) subcutaneous once a day (at bedtime) (04 Dec 2024 01:48)  isosorbide mononitrate 60 mg oral tablet, extended release: 1 tab(s) orally once a day (04 Dec 2024 01:16)  metoprolol succinate 100 mg oral tablet, extended release: 1 tab(s) orally once a day (04 Dec 2024 01:16)  ranolazine 500 mg oral tablet, extended release: 1 tab(s) orally 2 times a day (04 Dec 2024 01:16)      CURRENT CARDIAC MEDICATIONS:  amLODIPine   Tablet 10 milliGRAM(s) Oral daily  isosorbide   mononitrate ER Tablet (IMDUR) 60 milliGRAM(s) Oral daily  metoprolol succinate  milliGRAM(s) Oral daily  ranolazine 500 milliGRAM(s) Oral two times a day  spironolactone 25 milliGRAM(s) Oral daily      Antianginal Therapies:        Beta Blockers:  Metoprolol Succinate 100 mg       Calcium Channel Blockers: Amlodipine 10 mg       Long Acting Nitrates: Isosorbide 60 mg       Ranexa: 500 mg BID    ALLERGIES:   No Known Allergies      REVIEW OF SYMPTOMS:   CONSTITUTIONAL: o fever, no chills, no weight loss, no weight gain, no fatigue   CARDIOVASCULAR: ***  RESPIRATORY: no Shortness of breath, no cough, no wheezing  : No dysuria, no hematuria   GI: No dark color stool, no nausea, no diarrhea, no constipation, no abdominal pain   NEURO: No headache, no slurred speech   ALL OTHER REVIEW OF SYSTEMS ARE NEGATIVE.    VITAL SIGNS:  T(C): 36.5 (12-09-24 @ 08:25), Max: 36.8 (12-09-24 @ 00:00)  T(F): 97.7 (12-09-24 @ 08:25), Max: 98.3 (12-09-24 @ 00:00)  HR: 70 (12-09-24 @ 12:32) (62 - 72)  BP: 158/77 (12-09-24 @ 12:32) (118/61 - 159/82)  RR: 18 (12-09-24 @ 08:25) (16 - 18)  SpO2: 98% (12-09-24 @ 09:02) (96% - 98%)    INTAKE AND OUTPUT:     12-08 @ 07:01  -  12-09 @ 07:00  --------------------------------------------------------  IN: 402 mL / OUT: 1800 mL / NET: -1398 mL        PHYSICAL EXAM:  Constitutional: Comfortable . No acute distress.   HEENT: Atraumatic and normocephalic , neck is supple . no JVD. No carotid bruit.  CNS: A&Ox3. No focal deficits.   Respiratory: CTAB, unlabored   Cardiovascular: RRR normal s1 s2. No murmur. No rubs or gallop.  Gastrointestinal: Soft, non-tender. +Bowel sounds.   Extremities: 2+ Peripheral Pulses, No clubbing, cyanosis, or edema  Psychiatric: Calm . no agitation.   Skin: Warm and dry, no ulcers on extremities     LABS:                            9.9    10.38 )-----------( 472      ( 09 Dec 2024 04:11 )             32.3     12-09    139  |  104  |  39.8[H]  ----------------------------<  259[H]  4.7   |  19.0[L]  |  1.60[H]    Ca    9.1      09 Dec 2024 04:11  Mg     2.4     12-08        Urinalysis Basic - ( 09 Dec 2024 04:11 )    Color: x / Appearance: x / SG: x / pH: x  Gluc: 259 mg/dL / Ketone: x  / Bili: x / Urobili: x   Blood: x / Protein: x / Nitrite: x   Leuk Esterase: x / RBC: x / WBC x   Sq Epi: x / Non Sq Epi: x / Bacteria: x        ECG:   < from: 12 Lead ECG (12.03.24 @ 15:39) >  Diagnosis Line Ventricular-paced rhythm  Abnormal ECG  When compared with ECG of 16-NOV-2024 21:05,  Electronic ventricular pacemaker has replaced Sinus rhythm  Confirmed by Stevie Givens MD ([id number]) on 12/3/2024 4:21:21 PM        < end of copied text >  Prior ECG: Yes [  ] No [  ]    CARDIAC TESTING   ECHO:  < from: TTE W or WO Ultrasound Enhancing Agent (12.05.24 @ 21:37) >  CONCLUSIONS:      1. Technically difficult image quality.   2. Left ventricular cavity is normal in size. Left ventricular systolic function is moderately decreased with an ejection fraction of 39 % by Taylor's method of disks with an ejection fraction visually estimated at 35 to 40 %.   3. Multiple segmental abnormalities exist. See findings.   4. Unable to assess left ventricular diastolic function due to insufficient data.   5. Normal right ventricular cavity size and normal right ventricular systolic function.   6. Left atrium is normal in size.   7. Mild to moderate mitral regurgitation.   8. Mild tricuspid regurgitation.   9. Estimated pulmonary artery systolic pressure is 38 mmHg, consistent with mild pulmonary hypertension.  10. Trileaflet aortic valve. Fibrocalcific aortic valve sclerosis without stenosis.  11. Trace aortic regurgitation.  12. No pericardial effusion seen.  13. Compared to the transthoracic echocardiogram performed on 10/8/2024, there is further decline in left ventricular function, from 45-50% to 35-40%.      < end of copied text >      CATH:   Cardiac Interventions:  < from: Cardiac Catheterization (11.19.24 @ 08:45) >  Diagnostic Conclusions:     1. Multivessel coronary artery disease with interval development of  diffuse, critical in-stent restenosis (up to 99%) in RCA. Stable  95% sequential lesions in both limbs of large branching OM1. Diffuse  severe (up to 95%) stenosis of the mid-apical LAD.    2. LVEDP 15 mmHg.      Recommendations:     Given absence of ACS or angina, and after reviewing case and images  with second interventionalist / ACP, PCI of severe,  prox-distal RCA ISR was deferred in favor of optimizing GDMT for  CHF/cardiomyopathy and outpatient stress imaging to guide  revascularization strategy. Low threshold to pursue RCA  revascularization should any typical or atypical anginal symptoms  develop.      Acute complication:    No complications     Presentation:   Chief Compaint: Progressive exertional dyspnea     Procedure Narrative:   The risks and alternatives of the procedures and conscious sedation  were explained to the patient and informed consent was  obtained. The patient was brought to the cath lab and placed on the  exam table.  Access     Patient: SHIREEN CASH                 MRN: [id number]  Study Date: 11/19/2024   08:45 AM      Page 1 of 4        < end of copied text >

## 2024-12-10 LAB
ALBUMIN SERPL ELPH-MCNC: 3.7 G/DL — SIGNIFICANT CHANGE UP (ref 3.3–5.2)
ALP SERPL-CCNC: 92 U/L — SIGNIFICANT CHANGE UP (ref 40–120)
ALT FLD-CCNC: 8 U/L — SIGNIFICANT CHANGE UP
ANION GAP SERPL CALC-SCNC: 13 MMOL/L — SIGNIFICANT CHANGE UP (ref 5–17)
APPEARANCE UR: ABNORMAL
AST SERPL-CCNC: 10 U/L — SIGNIFICANT CHANGE UP
BACTERIA # UR AUTO: ABNORMAL /HPF
BILIRUB SERPL-MCNC: 0.3 MG/DL — LOW (ref 0.4–2)
BILIRUB UR-MCNC: NEGATIVE — SIGNIFICANT CHANGE UP
BUN SERPL-MCNC: 33.3 MG/DL — HIGH (ref 8–20)
CALCIUM SERPL-MCNC: 10.2 MG/DL — SIGNIFICANT CHANGE UP (ref 8.4–10.5)
CAST: 1 /LPF — SIGNIFICANT CHANGE UP (ref 0–4)
CHLORIDE SERPL-SCNC: 97 MMOL/L — SIGNIFICANT CHANGE UP (ref 96–108)
CO2 SERPL-SCNC: 24 MMOL/L — SIGNIFICANT CHANGE UP (ref 22–29)
COLOR SPEC: YELLOW — SIGNIFICANT CHANGE UP
CREAT SERPL-MCNC: 1.41 MG/DL — HIGH (ref 0.5–1.3)
DIFF PNL FLD: ABNORMAL
EGFR: 40 ML/MIN/1.73M2 — LOW
GLUCOSE BLDC GLUCOMTR-MCNC: 187 MG/DL — HIGH (ref 70–99)
GLUCOSE BLDC GLUCOMTR-MCNC: 243 MG/DL — HIGH (ref 70–99)
GLUCOSE BLDC GLUCOMTR-MCNC: 299 MG/DL — HIGH (ref 70–99)
GLUCOSE BLDC GLUCOMTR-MCNC: 312 MG/DL — HIGH (ref 70–99)
GLUCOSE BLDC GLUCOMTR-MCNC: 388 MG/DL — HIGH (ref 70–99)
GLUCOSE SERPL-MCNC: 292 MG/DL — HIGH (ref 70–99)
GLUCOSE UR QL: 500 MG/DL
HCT VFR BLD CALC: 34.8 % — SIGNIFICANT CHANGE UP (ref 34.5–45)
HGB BLD-MCNC: 11 G/DL — LOW (ref 11.5–15.5)
KETONES UR-MCNC: NEGATIVE MG/DL — SIGNIFICANT CHANGE UP
LEUKOCYTE ESTERASE UR-ACNC: ABNORMAL
MCHC RBC-ENTMCNC: 27 PG — SIGNIFICANT CHANGE UP (ref 27–34)
MCHC RBC-ENTMCNC: 31.6 G/DL — LOW (ref 32–36)
MCV RBC AUTO: 85.5 FL — SIGNIFICANT CHANGE UP (ref 80–100)
NITRITE UR-MCNC: NEGATIVE — SIGNIFICANT CHANGE UP
PH UR: 6 — SIGNIFICANT CHANGE UP (ref 5–8)
PLATELET # BLD AUTO: 474 K/UL — HIGH (ref 150–400)
POTASSIUM SERPL-MCNC: 4.9 MMOL/L — SIGNIFICANT CHANGE UP (ref 3.5–5.3)
POTASSIUM SERPL-SCNC: 4.9 MMOL/L — SIGNIFICANT CHANGE UP (ref 3.5–5.3)
PROT SERPL-MCNC: 7.5 G/DL — SIGNIFICANT CHANGE UP (ref 6.6–8.7)
PROT UR-MCNC: NEGATIVE MG/DL — SIGNIFICANT CHANGE UP
RBC # BLD: 4.07 M/UL — SIGNIFICANT CHANGE UP (ref 3.8–5.2)
RBC # FLD: 19.7 % — HIGH (ref 10.3–14.5)
RBC CASTS # UR COMP ASSIST: 30 /HPF — HIGH (ref 0–4)
SODIUM SERPL-SCNC: 134 MMOL/L — LOW (ref 135–145)
SP GR SPEC: 1.01 — SIGNIFICANT CHANGE UP (ref 1–1.03)
SQUAMOUS # UR AUTO: 0 /HPF — SIGNIFICANT CHANGE UP (ref 0–5)
TROPONIN T, HIGH SENSITIVITY RESULT: 56 NG/L — HIGH (ref 0–51)
UROBILINOGEN FLD QL: 0.2 MG/DL — SIGNIFICANT CHANGE UP (ref 0.2–1)
WBC # BLD: 9.57 K/UL — SIGNIFICANT CHANGE UP (ref 3.8–10.5)
WBC # FLD AUTO: 9.57 K/UL — SIGNIFICANT CHANGE UP (ref 3.8–10.5)
WBC UR QL: 132 /HPF — HIGH (ref 0–5)

## 2024-12-10 PROCEDURE — 73700 CT LOWER EXTREMITY W/O DYE: CPT | Mod: 26,RT

## 2024-12-10 PROCEDURE — 93010 ELECTROCARDIOGRAM REPORT: CPT

## 2024-12-10 PROCEDURE — 99232 SBSQ HOSP IP/OBS MODERATE 35: CPT

## 2024-12-10 RX ORDER — MAGNESIUM, ALUMINUM HYDROXIDE 200-225/5
30 SUSPENSION, ORAL (FINAL DOSE FORM) ORAL ONCE
Refills: 0 | Status: COMPLETED | OUTPATIENT
Start: 2024-12-10 | End: 2024-12-10

## 2024-12-10 RX ORDER — FUROSEMIDE 40 MG/1
40 TABLET ORAL DAILY
Refills: 0 | Status: DISCONTINUED | OUTPATIENT
Start: 2024-12-10 | End: 2024-12-18

## 2024-12-10 RX ORDER — ACETAMINOPHEN 500MG 500 MG/1
650 TABLET, COATED ORAL ONCE
Refills: 0 | Status: COMPLETED | OUTPATIENT
Start: 2024-12-10 | End: 2024-12-10

## 2024-12-10 RX ADMIN — Medication 5: at 13:31

## 2024-12-10 RX ADMIN — Medication 1: at 21:33

## 2024-12-10 RX ADMIN — METOPROLOL TARTRATE 100 MILLIGRAM(S): 100 TABLET, FILM COATED ORAL at 05:15

## 2024-12-10 RX ADMIN — RANOLAZINE 500 MILLIGRAM(S): 1000 TABLET, FILM COATED, EXTENDED RELEASE ORAL at 17:47

## 2024-12-10 RX ADMIN — Medication 30 MILLILITER(S): at 16:46

## 2024-12-10 RX ADMIN — ACETAMINOPHEN 500MG 650 MILLIGRAM(S): 500 TABLET, COATED ORAL at 11:21

## 2024-12-10 RX ADMIN — Medication 5 UNIT(S): at 09:20

## 2024-12-10 RX ADMIN — FUROSEMIDE 40 MILLIGRAM(S): 40 TABLET ORAL at 12:26

## 2024-12-10 RX ADMIN — GABAPENTIN 300 MILLIGRAM(S): 300 CAPSULE ORAL at 21:31

## 2024-12-10 RX ADMIN — Medication 60 MILLIGRAM(S): at 12:20

## 2024-12-10 RX ADMIN — ACETAMINOPHEN 500MG 650 MILLIGRAM(S): 500 TABLET, COATED ORAL at 16:45

## 2024-12-10 RX ADMIN — AMLODIPINE BESYLATE 10 MILLIGRAM(S): 10 TABLET ORAL at 05:15

## 2024-12-10 RX ADMIN — Medication 5 UNIT(S): at 17:48

## 2024-12-10 RX ADMIN — Medication 25 MILLIGRAM(S): at 05:15

## 2024-12-10 RX ADMIN — Medication 4: at 17:49

## 2024-12-10 RX ADMIN — INSULIN GLARGINE 26 UNIT(S): 100 INJECTION, SOLUTION SUBCUTANEOUS at 21:32

## 2024-12-10 RX ADMIN — Medication 5 UNIT(S): at 13:31

## 2024-12-10 RX ADMIN — Medication 81 MILLIGRAM(S): at 12:20

## 2024-12-10 RX ADMIN — CLOPIDOGREL 75 MILLIGRAM(S): 75 TABLET, FILM COATED ORAL at 12:21

## 2024-12-10 RX ADMIN — Medication 5000 UNIT(S): at 05:15

## 2024-12-10 RX ADMIN — RANOLAZINE 500 MILLIGRAM(S): 1000 TABLET, FILM COATED, EXTENDED RELEASE ORAL at 05:15

## 2024-12-10 RX ADMIN — ACETAMINOPHEN 500MG 650 MILLIGRAM(S): 500 TABLET, COATED ORAL at 17:51

## 2024-12-10 RX ADMIN — ACETAMINOPHEN, DIPHENHYDRAMINE HCL, PHENYLEPHRINE HCL 3 MILLIGRAM(S): 325; 25; 5 TABLET ORAL at 21:31

## 2024-12-10 RX ADMIN — Medication 5000 UNIT(S): at 13:29

## 2024-12-10 RX ADMIN — Medication 5000 UNIT(S): at 21:32

## 2024-12-10 RX ADMIN — Medication 80 MILLIGRAM(S): at 21:31

## 2024-12-10 NOTE — PROGRESS NOTE ADULT - PROBLEM SELECTOR PROBLEM 1
Acute on chronic HFrEF (heart failure with reduced ejection fraction)
Acute on chronic systolic congestive heart failure
Acute on chronic HFrEF (heart failure with reduced ejection fraction)

## 2024-12-10 NOTE — PROGRESS NOTE ADULT - SUBJECTIVE AND OBJECTIVE BOX
[facility]                                                         CARDIOLOGY AT [facility]                                                                  [address] [address]                                                         Telephone: [phone]. Fax:[phone]                                                                             PROGRESS NOTE    Reason for follow up:   [facility] HFrEF  Update:   S/P cardiomemes - groin with dressing intact    Review of symptoms:   Cardiac:  No chest pain. No dyspnea. No palpitations.  Respiratory: no cough. No dyspnea  Gastrointestinal: No diarrhea. No abdominal pain. No bleeding.   Neuro: No focal neuro complaints.    Vitals:  T(C): 36.8 (12-10-24 @ 08:30), Max: 36.9 (12-10-24 @ 00:32)  HR: 66 (12-10-24 @ 08:30) (62 - 79)  BP: 150/68 (12-10-24 @ 08:30) (136/71 - 163/78)  RR: 16 (12-10-24 @ 08:30) (16 - 18)  SpO2: 98% (12-10-24 @ 08:30) (94% - 99%)  I&O's Summary    09 Dec 2024 07:01  -  10 Dec 2024 07:00  --------------------------------------------------------  IN: 400 mL / OUT: 1500 mL / NET: -1100 ml    PHYSICAL EXAM:  Appearance: Comfortable. No acute distress  HEENT:  Atraumatic. Normocephalic.  Normal oral mucosa  Neurologic: A & O x 3, no gross focal deficits.  Cardiovascular: RRR S1 S2, No murmur, no rubs/gallops. No JVD  Respiratory: Lungs clear to auscultation, unlabored   Gastrointestinal:  Soft, Non-tender, + BS  Lower Extremities: 2+ Peripheral Pulses, No clubbing, cyanosis, or edema  Psychiatry: Patient is calm. No agitation.   Skin: warm and dry.  Right groin:  Suture to be removed by IC, site dry and intack, no swelling or pain noted, no bleeding or bruising.      CURRENT CARDIAC MEDICATIONS:  amLODIPine   Tablet 10 milliGRAM(s) Oral daily  isosorbide   mononitrate ER Tablet (IMDUR) 60 milliGRAM(s) Oral daily  metoprolol succinate  milliGRAM(s) Oral daily  ranolazine 500 milliGRAM(s) Oral two times a day  spironolactone 25 milliGRAM(s) Oral daily    CURRENT OTHER MEDICATIONS:  acetaminophen     Tablet .. 650 milliGRAM(s) Oral every 6 hours PRN Temp greater or equal to 38C (100.4F), Mild Pain (1 - 3)  gabapentin 300 milliGRAM(s) Oral at bedtime  melatonin 3 milliGRAM(s) Oral at bedtime PRN Insomnia  ondansetron Injectable 4 milliGRAM(s) IV Push every 8 hours PRN Nausea and/or Vomiting  aluminum hydroxide/magnesium hydroxide/simethicone Suspension 30 milliLiter(s) Oral every 4 hours PRN Dyspepsia  atorvastatin 80 milliGRAM(s) Oral at bedtime  dextrose 50% Injectable 25 Gram(s) IV Push once, Stop order after: 1 Doses  dextrose 50% Injectable 12.5 Gram(s) IV Push once, Stop order after: 1 Doses  dextrose 50% Injectable 25 Gram(s) IV Push once, Stop order after: 1 Doses  dextrose Oral Gel 15 Gram(s) Oral once, Stop order after: 1 Doses PRN Blood Glucose LESS THAN 70 milliGRAM(s)/deciliter  glucagon  Injectable 1 milliGRAM(s) IntraMuscular once, Stop order after: 1 Doses  insulin glargine Injectable (LANTUS) 26 Unit(s) SubCutaneous at bedtime  insulin lispro (ADMELOG) corrective regimen sliding scale   SubCutaneous Before meals and at bedtime  insulin lispro Injectable (ADMELOG) 5 Unit(s) SubCutaneous three times a day before meals  aspirin enteric coated 81 milliGRAM(s) Oral daily  clopidogrel Tablet 75 milliGRAM(s) Oral daily  dextrose 5%. 1000 milliLiter(s) (100 mL/Hr) IV Continuous <Continuous>  dextrose 5%. 1000 milliLiter(s) (50 mL/Hr) IV Continuous <Continuous>  heparin   Injectable 5000 Unit(s) SubCutaneous every 8 hours    LABS:	                           9.9    10.38 )-----------( 472      ( 09 Dec 2024 04:11 )             32.3     12-09    139  |  104  |  39.8[H]  ----------------------------<  259[H]  4.7   |  19.0[L]  |  1.60[H]    Ca    9.1      09 Dec 2024 04:11    TSH: Thyroid Stimulating Hormone, Serum: 1.47 uIU/mL    TELEMETRY:  V paced.                                                                 [facility]                                                         CARDIOLOGY AT [facility]                                                                  [address], [place] [address]                                                         Telephone: [phone]. Fax:[phone]                                                                             PROGRESS NOTE    Reason for follow up:   [facility] HFrEF  Update:   S/P cardiomemes - groin with dressing intact    Cardiomems 11 yesterday in the lab  Today Cardiomemes 14-16 lying flat - restart Lasix 40mg po daily - titrate as needed.    Review of symptoms:   Cardiac:  No chest pain. No dyspnea. No palpitations.  Respiratory: no cough. No dyspnea  Gastrointestinal: No diarrhea. No abdominal pain. No bleeding.   Neuro: No focal neuro complaints.    Vitals:  T(C): 36.8 (12-10-24 @ 08:30), Max: 36.9 (12-10-24 @ 00:32)  HR: 66 (12-10-24 @ 08:30) (62 - 79)  BP: 150/68 (12-10-24 @ 08:30) (136/71 - 163/78)  RR: 16 (12-10-24 @ 08:30) (16 - 18)  SpO2: 98% (12-10-24 @ 08:30) (94% - 99%)  I&O's Summary    09 Dec 2024 07:01  -  10 Dec 2024 07:00  --------------------------------------------------------  IN: 400 mL / OUT: 1500 mL / NET: -1100 ml    PHYSICAL EXAM:  Appearance: Comfortable. No acute distress  HEENT:  Atraumatic. Normocephalic.  Normal oral mucosa  Neurologic: A & O x 3, no gross focal deficits.  Cardiovascular: RRR S1 S2, No murmur, no rubs/gallops. No JVD  Respiratory: Lungs clear to auscultation, unlabored   Gastrointestinal:  Soft, Non-tender, + BS  Lower Extremities: 2+ Peripheral Pulses, No clubbing, cyanosis, or edema  Psychiatry: Patient is calm. No agitation.   Skin: warm and dry.  Right groin:  Suture to be removed by IC, site dry and intack, no swelling or pain noted, no bleeding or bruising.      CURRENT CARDIAC MEDICATIONS:  amLODIPine   Tablet 10 milliGRAM(s) Oral daily  isosorbide   mononitrate ER Tablet (IMDUR) 60 milliGRAM(s) Oral daily  metoprolol succinate  milliGRAM(s) Oral daily  ranolazine 500 milliGRAM(s) Oral two times a day  spironolactone 25 milliGRAM(s) Oral daily    CURRENT OTHER MEDICATIONS:  acetaminophen     Tablet .. 650 milliGRAM(s) Oral every 6 hours PRN Temp greater or equal to 38C (100.4F), Mild Pain (1 - 3)  gabapentin 300 milliGRAM(s) Oral at bedtime  melatonin 3 milliGRAM(s) Oral at bedtime PRN Insomnia  ondansetron Injectable 4 milliGRAM(s) IV Push every 8 hours PRN Nausea and/or Vomiting  aluminum hydroxide/magnesium hydroxide/simethicone Suspension 30 milliLiter(s) Oral every 4 hours PRN Dyspepsia  atorvastatin 80 milliGRAM(s) Oral at bedtime  dextrose 50% Injectable 25 Gram(s) IV Push once, Stop order after: 1 Doses  dextrose 50% Injectable 12.5 Gram(s) IV Push once, Stop order after: 1 Doses  dextrose 50% Injectable 25 Gram(s) IV Push once, Stop order after: 1 Doses  dextrose Oral Gel 15 Gram(s) Oral once, Stop order after: 1 Doses PRN Blood Glucose LESS THAN 70 milliGRAM(s)/deciliter  glucagon  Injectable 1 milliGRAM(s) IntraMuscular once, Stop order after: 1 Doses  insulin glargine Injectable (LANTUS) 26 Unit(s) SubCutaneous at bedtime  insulin lispro (ADMELOG) corrective regimen sliding scale   SubCutaneous Before meals and at bedtime  insulin lispro Injectable (ADMELOG) 5 Unit(s) SubCutaneous three times a day before meals  aspirin enteric coated 81 milliGRAM(s) Oral daily  clopidogrel Tablet 75 milliGRAM(s) Oral daily  dextrose 5%. 1000 milliLiter(s) (100 mL/Hr) IV Continuous <Continuous>  dextrose 5%. 1000 milliLiter(s) (50 mL/Hr) IV Continuous <Continuous>  heparin   Injectable 5000 Unit(s) SubCutaneous every 8 hours    LABS:	                           9.9    10.38 )-----------( 472      ( 09 Dec 2024 04:11 )             32.3     12-09    139  |  104  |  39.8[H]  ----------------------------<  259[H]  4.7   |  19.0[L]  |  1.60[H]    Ca    9.1      09 Dec 2024 04:11    TSH: Thyroid Stimulating Hormone, Serum: 1.47 uIU/mL    TELEMETRY:  V paced.

## 2024-12-10 NOTE — PROGRESS NOTE ADULT - ASSESSMENT
75 y/o female with PMH of  HTN, DM2, CKD 3, heart block s/p Micra PPM, CVA, CAD/stent, PAD, CVA, HFmrEF with recurrent hospitalization for HF in 10/8-10/14 and 11/16-11/21/24 underwent repeat LHC on 11/19/24 with critical ISR of RCA stent 99% and 95% OM1 with 95% afi-fc-[id number] LAD no PCI perform yet for optimization of CHF, discharged on Lasix 40mg once daily with low dose Entresto had mild BRADY, interval with worsening dyspnea despite extra Lasix use prompted return to the ER and readmitted for ADHF,  Echo EF 35%  Mild to moderate regurg

## 2024-12-10 NOTE — PROCEDURE NOTE - ADDITIONAL PROCEDURE DETAILS
RFV Figure of eight suture removed, mild oozing post suture removal, manual pressure applied for 15 mts and hemostasis achieved  RT groin benign, duistal pulses 2+, no bleeding or hematoma  keep rt groin straight for 2 hours post suture removal, patient can sit up in bed after 2 hours, OOB if RT Foot CT with no fracture/dislocation based on hospitalist discretion

## 2024-12-10 NOTE — PROGRESS NOTE ADULT - NS ATTEND AMEND GEN_ALL_CORE FT
73 yo woman with CAD, HF who presented with CHF exacerbation and anemia. Rectal exam yesterday with green stool. No further black or bloody BMs overnight. She did overcorrect with 1 unit pRBC. No overt signs of GI bleed at present and considering cardiac comorbidities would manage conservatively unless has overt signs of GI bleed, please continue antiplatelet medications. Continue to trend hgb and continue PPI BID. Transfuse for hgb >8. When is optimized/ treated will need outpatient colorectal cancer screening as she has never had this. Please call with any questions or concerns
examined at bed side, feels better, appears better vol. status. Will check cardioMEM today and adjust oral diuretics.   Otherwise can go home this afternoon.
74F Citizen of the [place]-speaking ( # [id number]) history significant for HTN, DM2, CKD 3, heart block s/p Micra PPM, CVA, CAD/stent, PAD, CVA, HFmrEF with recurrent hospitalization for HF in 10/8-10/14 and 11/16-11/21/24 underwent repeat LHC on 11/19/24 with critical ISR of RCA stent 99% and 95% OM1 with 95% [id number] LAD no PCI perform yet for optimization of CHF, discharged on Lasix 40mg once daily with low dose Entresto had mild BRADY, interval with worsening dyspnea despite extra Lasix use prompted return to the ER and readmitted for ADHF,      curbside to EP yesterday, they will evaluate for upgrade monday if we appropriate after LHC  continue diuresis  plan for LHC with PCI monday  consideration of cardiomems monday, patient is in agreement    we will follow to continue optimization.
74F Malaysian-speaking ( # [id number]) history significant for HTN, DM2, CKD 3, heart block s/p Micra PPM, CVA, CAD/stent, PAD, CVA, HFmrEF with recurrent hospitalization for HF in 10/8-10/14 and 11/16-11/21/24 underwent repeat LHC on 11/19/24 with critical ISR of RCA stent 99% and 95% OM1 with 95% svo-vr-uaahal LAD no PCI perform yet for optimization of CHF, discharged on Lasix 40mg once daily with low dose Entresto had mild BRADY, interval with worsening dyspnea despite extra Lasix use prompted return to the ER and readmitted for ADHF,      curbside to EP, they will evaluate for upgrade monday  continue diuresis  plan for LHC with PCI monday    we will follow to continue optimization
74F [place]-speaking ( # [id number]) history significant for HTN, DM2, CKD 3, heart block s/p Micra PPM, CVA, CAD/stent, PAD, CVA, HFmrEF with recurrent hospitalization for HF in 10/8-10/14 and 11/16-11/21/24 underwent repeat LHC on 11/19/24 with critical ISR of RCA stent 99% and 95% OM1 with 95% szj-vw-oczrfw LAD no PCI perform yet for optimization of CHF, discharged on Lasix 40mg once daily with low dose Entresto had mild BRADY, interval with worsening dyspnea despite extra Lasix use prompted return to the ER and readmitted for ADHF    Acute heart failure recurrent with improvement  At baseline creatinine range  LHC with possible PCI 12/9  Consideration of mems this admission    patient in agreement with plan  telemetry reviewed  keep npo at midnight.
74F [place]-speaking ( # [id number]) history significant for HTN, DM2, CKD 3, heart block s/p Micra PPM, CVA, CAD/stent, PAD, CVA, HFmrEF with recurrent hospitalization for HF in 10/8-10/14 and 11/16-11/21/24 underwent repeat LHC on 11/19/24 with critical ISR of RCA stent 99% and 95% OM1 with 95% uxp-ts-skcuww LAD no PCI perform yet for optimization of CHF, discharged on Lasix 40mg once daily with low dose Entresto had mild BRADY, interval with worsening dyspnea despite extra Lasix use prompted return to the ER and readmitted for ADHF, pBNP 22K (prior 16K), Cr. 1.7 and Hb 7.8      multivessel CAD with critical ISR of RCA stent 99% and 95% OM1 with 95% obg-ft-vebjyq LAD 11/19/24 cardiac catheterization  s/p Micra PPM for CHB  Recurrent HFmrEF, NYHA III, ACC C  CVA history  Hypertension  DM type 2  CKD stage 3      complex case, recurrent HF exacerbations recently.  recommend optimization of GDMT and hypervolemia.  restart entresto after cardiac catheterization tentatively planned for 12/9  transfuse PRBC if hgb <8, iron transfusions recommended Total iron 10 and iron sat 4%  may need consideration of epo    needs tune up prior to consideration of PCI unless deemed emergent and EP evaluation for CRT-D upgrade in near term.  telemetry appropriate  monitor for signs of bleeding    Patient understands.    we will follow.

## 2024-12-10 NOTE — PROGRESS NOTE ADULT - SUBJECTIVE AND OBJECTIVE BOX
SHIREEN CASH    [id number]    74y      Female    Patient is a 74y old  Female who presents with a chief complaint of Acute on Chronic HFrEF Exacerbation (10 Dec 2024 09:03)      INTERVAL HPI/OVERNIGHT EVENTS:    Patient is doing ok, denies fever, chills, chest pain, sob   she has some foot pain     Vital Signs Last 24 Hrs  T(C): 36.8 (10 Dec 2024 12:19), Max: 36.9 (10 Dec 2024 00:32)  T(F): 98.2 (10 Dec 2024 12:19), Max: 98.4 (10 Dec 2024 00:32)  HR: 70 (10 Dec 2024 12:19) (62 - 79)  BP: 160/81 (10 Dec 2024 12:19) (136/71 - 163/78)  BP(mean): 81 (10 Dec 2024 12:19) (81 - 106)  RR: 16 (10 Dec 2024 12:19) (16 - 18)  SpO2: 97% (10 Dec 2024 12:19) (94% - 99%)    Parameters below as of 10 Dec 2024 12:19  Patient On (Oxygen Delivery Method): room air        PHYSICAL EXAM:    GENERAL: Elderly female looking comfortable   HEENT: PERRL, +EOMI  NECK: soft, Supple, No JVD  CHEST/LUNG: Clear to auscultate bilaterally; No wheezing  HEART: S1S2+, Regular rate and rhythm; No murmurs  ABDOMEN: Soft, Nontender, Nondistended; Bowel sounds present  EXTREMITIES:  1+ Peripheral Pulses, No edema  SKIN: ecchymosis of the right foot   NEURO: AAOX3  PSYCH: normal mood      LABS:                        9.9    10.38 )-----------( 472      ( 09 Dec 2024 04:11 )             32.3     12-09    139  |  104  |  39.8[H]  ----------------------------<  259[H]  4.7   |  19.0[L]  |  1.60[H]    Ca    9.1      09 Dec 2024 04:11        I&O's Summary    09 Dec 2024 07:01  -  10 Dec 2024 07:00  --------------------------------------------------------  IN: 400 mL / OUT: 1500 mL / NET: -1100 mL    10 Dec 2024 07:01  -  10 Dec 2024 12:36  --------------------------------------------------------  IN: 330 mL / OUT: 300 mL / NET: 30 mL        MEDICATIONS  (STANDING):  amLODIPine   Tablet 10 milliGRAM(s) Oral daily  aspirin enteric coated 81 milliGRAM(s) Oral daily  atorvastatin 80 milliGRAM(s) Oral at bedtime  clopidogrel Tablet 75 milliGRAM(s) Oral daily  dextrose 5%. 1000 milliLiter(s) (100 mL/Hr) IV Continuous <Continuous>  dextrose 5%. 1000 milliLiter(s) (50 mL/Hr) IV Continuous <Continuous>  dextrose 50% Injectable 25 Gram(s) IV Push once  dextrose 50% Injectable 12.5 Gram(s) IV Push once  dextrose 50% Injectable 25 Gram(s) IV Push once  furosemide    Tablet 40 milliGRAM(s) Oral daily  gabapentin 300 milliGRAM(s) Oral at bedtime  glucagon  Injectable 1 milliGRAM(s) IntraMuscular once  heparin   Injectable 5000 Unit(s) SubCutaneous every 8 hours  insulin glargine Injectable (LANTUS) 26 Unit(s) SubCutaneous at bedtime  insulin lispro (ADMELOG) corrective regimen sliding scale   SubCutaneous Before meals and at bedtime  insulin lispro Injectable (ADMELOG) 5 Unit(s) SubCutaneous three times a day before meals  isosorbide   mononitrate ER Tablet (IMDUR) 60 milliGRAM(s) Oral daily  metoprolol succinate  milliGRAM(s) Oral daily  ranolazine 500 milliGRAM(s) Oral two times a day  spironolactone 25 milliGRAM(s) Oral daily    MEDICATIONS  (PRN):  acetaminophen     Tablet .. 650 milliGRAM(s) Oral every 6 hours PRN Temp greater or equal to 38C (100.4F), Mild Pain (1 - 3)  aluminum hydroxide/magnesium hydroxide/simethicone Suspension 30 milliLiter(s) Oral every 4 hours PRN Dyspepsia  dextrose Oral Gel 15 Gram(s) Oral once PRN Blood Glucose LESS THAN 70 milliGRAM(s)/deciliter  melatonin 3 milliGRAM(s) Oral at bedtime PRN Insomnia  ondansetron Injectable 4 milliGRAM(s) IV Push every 8 hours PRN Nausea and/or Vomiting

## 2024-12-10 NOTE — PROGRESS NOTE ADULT - PROBLEM SELECTOR PLAN 1
Euvolemic on exam  Keep k >4 and mg >2  CHF Cardiomemo s/p - right groin suture to be removed by IC.  Groin precautions:  No lifting > 10 pounds, no submerging in water and no driving x 5 days.  DAPT:  Aspirn 81mg po daily and Plavix 75mg po daily   Monitor on telemetry.  Strict i/o and daily weights.  Keep K > 4, Mg > 2.  Monitor renal function with ongoing diuresis.  GDMT  Beta blocker Metoprolol  Diuretic hold Lasix - PRN  ARNI start Entresto after cath and d/c Norvasc   Ct - MRa Spirolactone  Consider Farxiga out patient.  Monitor on telemetry.  Strict i/o and daily weights.  Keep K > 4, Mg > 2.  Monitor renal function with ongoing diuresis.  DASH - low Sodium diet. Euvolemic on exam  Keep k >4 and mg >2  CHF Cardiomemo s/p - right groin suture to be removed by IC.  Groin precautions:  No lifting > 10 pounds, no submerging in water and no driving x 5 days.  DAPT:  Aspirn 81mg po daily and Plavix 75mg po daily   Monitor on telemetry.  Strict i/o and daily weights.  Keep K > 4, Mg > 2.  Monitor renal function with ongoing diuresis.  GDMT  Beta blocker Metoprolol  Diuretic on hold Lasix - PRN  ARNI start Entresto after cath and d/c Norvasc   Ct - MRa Spirolactone  Consider Farxiga out patient.  Monitor on telemetry.  Strict i/o and daily weights.  Keep K > 4, Mg > 2.  Monitor renal function with ongoing diuresis.  DASH - low Sodium diet.  S/P Cardiomemos - groin with dressing intact    Cardiomems 11 yesterday in the lab  Today Cardiomemes 14-16 lying flat - restart Lasix 40mg po daily - titrate as needed.   IC to remove figure 8 suture  "Groin precautions:  No lifting  >20 pounds, no bathing or submerging in water and no driving x 5 days

## 2024-12-10 NOTE — PROGRESS NOTE ADULT - ASSESSMENT
74 F with PMHX CAD s/p PCI, HFrEF, Mobitz II s/p PPM (MD NereydaT), PAD with chronic right LE wound, CVA, DM2, CKD3, HTN presents to [facility] ER c/o shortness of breath/RAMIREZ and worsening LE edema admitted for Acute on Chronic HFrEF Exacerbation and Hyperkalemia.    Plan:       # Aute Hypoxic Respiratory failure secondary to   # Acute on Chronic CHF wirh reduced EF   Still requiring 2LNC, though improved  - Daily weight, I/O, salt and fluid restriction  - Supplemental O2, wean as tolerated  - Furosemide 40mg daily, changed from IV   - ARNi on hold due to BRADY  - Metoprolol  - TTE reviewed  -s/p RHC and Cardiomems placement         BRADY on CKD stage 3b    Hyperkalemia at presentation, Resolved  Based on prior renla inces, Cr Cl more or ess at baseline  Permissive Azotemia with diuretics acceptable for cardiac optimization   Avoid Nephrotoxins  Trend lytes   creatinine is stable     Anemia ( Iron deficiency)  Stable   Venofer 200 mg IVPB daily (2/3)  monitor Hgb  Outpt GI colonoscopy or screening     CAD s/p PCI,   Mobitz Type 2 s/p MicraPPM  DAPT, Statin, BB, Nitrate and antianginal  going for PCA    Diabetes Mellitus type2 on insulin with neuropathy   A1c 7.1  Improving   Hold oral hypoglycemic agents  Blood glucose monitoring with sliding scale  Glargine 26U  Lispro 5U premeal tid  Lispro s/s   Gabapentin 300mg qHS    Foot pain: xrays done shows no fracture, will get CT scan     VTE Prophylaxis: SCDs/SQH [id number]    DISPO: Likely ALLA, PT eval

## 2024-12-11 LAB
ALBUMIN SERPL ELPH-MCNC: 3.6 G/DL — SIGNIFICANT CHANGE UP (ref 3.3–5.2)
ALP SERPL-CCNC: 101 U/L — SIGNIFICANT CHANGE UP (ref 40–120)
ALT FLD-CCNC: 8 U/L — SIGNIFICANT CHANGE UP
ANION GAP SERPL CALC-SCNC: 12 MMOL/L — SIGNIFICANT CHANGE UP (ref 5–17)
APPEARANCE UR: ABNORMAL
AST SERPL-CCNC: 12 U/L — SIGNIFICANT CHANGE UP
BACTERIA # UR AUTO: ABNORMAL /HPF
BILIRUB SERPL-MCNC: 0.3 MG/DL — LOW (ref 0.4–2)
BILIRUB UR-MCNC: NEGATIVE — SIGNIFICANT CHANGE UP
BUN SERPL-MCNC: 38.7 MG/DL — HIGH (ref 8–20)
CALCIUM SERPL-MCNC: 9.7 MG/DL — SIGNIFICANT CHANGE UP (ref 8.4–10.5)
CAST: 2 /LPF — SIGNIFICANT CHANGE UP (ref 0–4)
CHLORIDE SERPL-SCNC: 103 MMOL/L — SIGNIFICANT CHANGE UP (ref 96–108)
CO2 SERPL-SCNC: 21 MMOL/L — LOW (ref 22–29)
COLOR SPEC: YELLOW — SIGNIFICANT CHANGE UP
CREAT SERPL-MCNC: 1.65 MG/DL — HIGH (ref 0.5–1.3)
DIFF PNL FLD: ABNORMAL
EGFR: 33 ML/MIN/1.73M2 — LOW
GLUCOSE BLDC GLUCOMTR-MCNC: 107 MG/DL — HIGH (ref 70–99)
GLUCOSE BLDC GLUCOMTR-MCNC: 184 MG/DL — HIGH (ref 70–99)
GLUCOSE BLDC GLUCOMTR-MCNC: 189 MG/DL — HIGH (ref 70–99)
GLUCOSE BLDC GLUCOMTR-MCNC: 212 MG/DL — HIGH (ref 70–99)
GLUCOSE SERPL-MCNC: 154 MG/DL — HIGH (ref 70–99)
GLUCOSE UR QL: NEGATIVE MG/DL — SIGNIFICANT CHANGE UP
HCT VFR BLD CALC: 32 % — LOW (ref 34.5–45)
HGB BLD-MCNC: 10.3 G/DL — LOW (ref 11.5–15.5)
KETONES UR-MCNC: NEGATIVE MG/DL — SIGNIFICANT CHANGE UP
LEUKOCYTE ESTERASE UR-ACNC: ABNORMAL
MCHC RBC-ENTMCNC: 27 PG — SIGNIFICANT CHANGE UP (ref 27–34)
MCHC RBC-ENTMCNC: 32.2 G/DL — SIGNIFICANT CHANGE UP (ref 32–36)
MCV RBC AUTO: 84 FL — SIGNIFICANT CHANGE UP (ref 80–100)
NITRITE UR-MCNC: POSITIVE
PH UR: 6 — SIGNIFICANT CHANGE UP (ref 5–8)
PLATELET # BLD AUTO: 460 K/UL — HIGH (ref 150–400)
POTASSIUM SERPL-MCNC: 4.8 MMOL/L — SIGNIFICANT CHANGE UP (ref 3.5–5.3)
POTASSIUM SERPL-SCNC: 4.8 MMOL/L — SIGNIFICANT CHANGE UP (ref 3.5–5.3)
PROT SERPL-MCNC: 7.1 G/DL — SIGNIFICANT CHANGE UP (ref 6.6–8.7)
PROT UR-MCNC: NEGATIVE MG/DL — SIGNIFICANT CHANGE UP
RBC # BLD: 3.81 M/UL — SIGNIFICANT CHANGE UP (ref 3.8–5.2)
RBC # FLD: 19.9 % — HIGH (ref 10.3–14.5)
RBC CASTS # UR COMP ASSIST: 6 /HPF — HIGH (ref 0–4)
SODIUM SERPL-SCNC: 136 MMOL/L — SIGNIFICANT CHANGE UP (ref 135–145)
SP GR SPEC: 1.01 — SIGNIFICANT CHANGE UP (ref 1–1.03)
SQUAMOUS # UR AUTO: 0 /HPF — SIGNIFICANT CHANGE UP (ref 0–5)
UROBILINOGEN FLD QL: 0.2 MG/DL — SIGNIFICANT CHANGE UP (ref 0.2–1)
WBC # BLD: 9.66 K/UL — SIGNIFICANT CHANGE UP (ref 3.8–10.5)
WBC # FLD AUTO: 9.66 K/UL — SIGNIFICANT CHANGE UP (ref 3.8–10.5)
WBC UR QL: 172 /HPF — HIGH (ref 0–5)

## 2024-12-11 PROCEDURE — 99232 SBSQ HOSP IP/OBS MODERATE 35: CPT

## 2024-12-11 RX ORDER — CEFTRIAXONE SODIUM 1 G
1000 VIAL (EA) INJECTION EVERY 24 HOURS
Refills: 0 | Status: DISCONTINUED | OUTPATIENT
Start: 2024-12-11 | End: 2024-12-11

## 2024-12-11 RX ORDER — CEFTRIAXONE SODIUM 1 G
1000 VIAL (EA) INJECTION EVERY 24 HOURS
Refills: 0 | Status: DISCONTINUED | OUTPATIENT
Start: 2024-12-11 | End: 2024-12-12

## 2024-12-11 RX ADMIN — Medication 81 MILLIGRAM(S): at 11:04

## 2024-12-11 RX ADMIN — Medication 25 MILLIGRAM(S): at 05:23

## 2024-12-11 RX ADMIN — RANOLAZINE 500 MILLIGRAM(S): 1000 TABLET, FILM COATED, EXTENDED RELEASE ORAL at 17:07

## 2024-12-11 RX ADMIN — Medication 2: at 09:25

## 2024-12-11 RX ADMIN — AMLODIPINE BESYLATE 10 MILLIGRAM(S): 10 TABLET ORAL at 05:23

## 2024-12-11 RX ADMIN — Medication 5 UNIT(S): at 09:25

## 2024-12-11 RX ADMIN — RANOLAZINE 500 MILLIGRAM(S): 1000 TABLET, FILM COATED, EXTENDED RELEASE ORAL at 05:22

## 2024-12-11 RX ADMIN — Medication 1: at 21:49

## 2024-12-11 RX ADMIN — Medication 5000 UNIT(S): at 05:22

## 2024-12-11 RX ADMIN — Medication 80 MILLIGRAM(S): at 21:29

## 2024-12-11 RX ADMIN — Medication 5000 UNIT(S): at 21:29

## 2024-12-11 RX ADMIN — Medication 5 UNIT(S): at 13:15

## 2024-12-11 RX ADMIN — INSULIN GLARGINE 26 UNIT(S): 100 INJECTION, SOLUTION SUBCUTANEOUS at 21:29

## 2024-12-11 RX ADMIN — Medication 60 MILLIGRAM(S): at 11:04

## 2024-12-11 RX ADMIN — FUROSEMIDE 40 MILLIGRAM(S): 40 TABLET ORAL at 05:22

## 2024-12-11 RX ADMIN — Medication 5 UNIT(S): at 17:06

## 2024-12-11 RX ADMIN — CLOPIDOGREL 75 MILLIGRAM(S): 75 TABLET, FILM COATED ORAL at 11:04

## 2024-12-11 RX ADMIN — Medication 5000 UNIT(S): at 13:16

## 2024-12-11 RX ADMIN — Medication 1000 MILLIGRAM(S): at 11:03

## 2024-12-11 RX ADMIN — Medication 1: at 17:06

## 2024-12-11 RX ADMIN — GABAPENTIN 300 MILLIGRAM(S): 300 CAPSULE ORAL at 21:28

## 2024-12-11 RX ADMIN — METOPROLOL TARTRATE 100 MILLIGRAM(S): 100 TABLET, FILM COATED ORAL at 05:22

## 2024-12-11 NOTE — PROGRESS NOTE ADULT - SUBJECTIVE AND OBJECTIVE BOX
"Anirudh Dowd is a 2 m.o. female who is being evaluated via a billable video visit.      The parent/guardian has been notified of following:     \"This video visit will be conducted via a call between you, your child, and your child's physician/provider. We have found that certain health care needs can be provided without the need for an in-person physical exam.  This service lets us provide the care you need with a video conversation.  If a prescription is necessary we can send it directly to your pharmacy.  If lab work is needed we can place an order for that and you can then stop by our lab to have the test done at a later time.    Video visits are billed at different rates depending on your insurance coverage. Please reach out to your insurance provider with any questions.    If during the course of the call the physician/provider feels a video visit is not appropriate, you will not be charged for this service.\"    Parent/guardian has given verbal consent to a Video visit? Yes    Will anyone else be joining your video visit? No        Video Start Time: 9:05 AM    Additional provider notes:  [facility] Lactation Phone Encounter     SUBJECTIVE:     Anirudh is here over video chat today with mom, Cortney, for lactation support. She is a 2 m.o. female born at Gestational Age: 41w1d now 73 days.    She is doing well. She has been gaining appropriately along her curve and is now 29% from birth weight.     Mom has been able to get off of the nipple shield for about 2 weeks - she has been off of it since the beginning of June. Mom found that this was easier than she expected. She does still fight feedings at time. It's like she's hungry but then pulls off of the breast. Mom wonders about an oversupply. It has been awhile since they have done bottle feedings.     Baby is nursing every 2 hours (though this varies a lot) for about 40 minutes per session. She sometimes just eats on one side at a time.   Mother " reports hearing audible swallows.   Baby feeds about 9+ times in 24 hours.   Baby is not currently supplemented.  Mom is also pumping about 1 time per day most days and gets about 4-6 oz per pumping session, this is after nursing her.   Number of wet diapers in 24 hours: 6+  Number of stools in 24 hours: 4-5   Color and consistency of stools: green, watery, sometimes mucous, sometimes frothy       Breastfeeding Goals: Nurse for 6 months, provide breast milk after that but will go back to work.     Previous Breastfeeding Experience: Mom breast fed her first child 8 years ago and had an oversupply of milk. First baby had reflux (excessive) and was colicky. Mom can't remember if this happened after switching to formula. She stopped nursing after 4-6 weeks weeks.     Maternal medications:No new  Maternal health conditions: No new      Results for orders placed or performed during the hospital encounter of 20   Cord Blood Evaluation   Result Value Ref Range    ABO Rh B NEG     Cord Blood BRIEN NEG Negative    ABO/Rh Repeat B NEG     Metabolic Screen   Result Value Ref Range    Scan Result See Scanned Report    POCT Glucose    Specimen: Vein; Blood   Result Value Ref Range    Glucose 45 (L) 51 - 139 mg/dL   POCT Glucose    Specimen: Vein; Blood   Result Value Ref Range    Glucose 64 51 - 139 mg/dL   POCT Glucose    Specimen: Capillary; Blood   Result Value Ref Range    Glucose 61 51 - 139 mg/dL     No current outpatient medications on file.  No past medical history on file.  No past surgical history on file.  No family history on file.      Primary care provider: Katy Mayen MD    OBJECTIVE:    Mother:     Sore nipples: No pain with nursing or pumping     Maternal depression screening: Doing well  EPDS: Referral to maternal PCP not made     Infant:     Age today: 73 days    There were no vitals filed for this visit.      Weight:   Wt Readings from Last 3 Encounters:   20 10 lb 12 oz (4.876 kg) (40 %,  SHIREEN CASH    [id number]    74y      Female    Patient is a 74y old  Female who presents with a chief complaint of Acute on Chronic HFrEF Exacerbation (10 Dec 2024 12:36)      INTERVAL HPI/OVERNIGHT EVENTS:    Patient has some burning urination, denies fever, chills, chest pain, sob, dizziness     Vital Signs Last 24 Hrs  T(C): 36.4 (11 Dec 2024 16:39), Max: 36.7 (10 Dec 2024 21:42)  T(F): 97.5 (11 Dec 2024 16:39), Max: 98.1 (10 Dec 2024 21:42)  HR: 65 (11 Dec 2024 16:39) (62 - 88)  BP: 162/87 (11 Dec 2024 16:39) (113/62 - 162/87)  BP(mean): 112 (11 Dec 2024 16:39) (105 - 112)  RR: 16 (11 Dec 2024 16:39) (16 - 18)  SpO2: 100% (11 Dec 2024 16:39) (95% - 100%)    Parameters below as of 11 Dec 2024 16:39  Patient On (Oxygen Delivery Method): room air        PHYSICAL EXAM:    GENERAL: Elderly female looking comfortable   HEENT: PERRL, +EOMI  NECK: soft, Supple, No JVD  CHEST/LUNG: Clear to auscultate bilaterally; No wheezing  HEART: S1S2+, Regular rate and rhythm; No murmurs  ABDOMEN: Soft, Nontender, Nondistended; Bowel sounds present  EXTREMITIES:  1+ Peripheral Pulses, No edema  SKIN: ecchymosis of the right foot   NEURO: AAOX3  PSYCH: normal mood        LABS:                        10.3   9.66  )-----------( 460      ( 11 Dec 2024 04:53 )             32.0     12-11    136  |  103  |  38.7[H]  ----------------------------<  154[H]  4.8   |  21.0[L]  |  1.65[H]    Ca    9.7      11 Dec 2024 04:53    TPro  7.1  /  Alb  3.6  /  TBili  0.3[L]  /  DBili  x   /  AST  12  /  ALT  8   /  AlkPhos  101  12-11      I&O's Summary    10 Dec 2024 07:01  -  11 Dec 2024 07:00  --------------------------------------------------------  IN: 902 mL / OUT: 1575 mL / NET: -673 mL    11 Dec 2024 07:01  -  11 Dec 2024 19:57  --------------------------------------------------------  IN: 720 mL / OUT: 1200 mL / NET: -480 mL        MEDICATIONS  (STANDING):  amLODIPine   Tablet 10 milliGRAM(s) Oral daily  aspirin enteric coated 81 milliGRAM(s) Oral daily  atorvastatin 80 milliGRAM(s) Oral at bedtime  cefTRIAXone Injectable. 1000 milliGRAM(s) IV Push every 24 hours  clopidogrel Tablet 75 milliGRAM(s) Oral daily  dextrose 5%. 1000 milliLiter(s) (50 mL/Hr) IV Continuous <Continuous>  dextrose 5%. 1000 milliLiter(s) (100 mL/Hr) IV Continuous <Continuous>  dextrose 50% Injectable 25 Gram(s) IV Push once  dextrose 50% Injectable 12.5 Gram(s) IV Push once  dextrose 50% Injectable 25 Gram(s) IV Push once  furosemide    Tablet 40 milliGRAM(s) Oral daily  gabapentin 300 milliGRAM(s) Oral at bedtime  glucagon  Injectable 1 milliGRAM(s) IntraMuscular once  heparin   Injectable 5000 Unit(s) SubCutaneous every 8 hours  insulin glargine Injectable (LANTUS) 26 Unit(s) SubCutaneous at bedtime  insulin lispro (ADMELOG) corrective regimen sliding scale   SubCutaneous Before meals and at bedtime  insulin lispro Injectable (ADMELOG) 5 Unit(s) SubCutaneous three times a day before meals  isosorbide   mononitrate ER Tablet (IMDUR) 60 milliGRAM(s) Oral daily  metoprolol succinate  milliGRAM(s) Oral daily  ranolazine 500 milliGRAM(s) Oral two times a day  spironolactone 25 milliGRAM(s) Oral daily    MEDICATIONS  (PRN):  acetaminophen     Tablet .. 650 milliGRAM(s) Oral every 6 hours PRN Temp greater or equal to 38C (100.4F), Mild Pain (1 - 3)  aluminum hydroxide/magnesium hydroxide/simethicone Suspension 30 milliLiter(s) Oral every 4 hours PRN Dyspepsia  dextrose Oral Gel 15 Gram(s) Oral once PRN Blood Glucose LESS THAN 70 milliGRAM(s)/deciliter  melatonin 3 milliGRAM(s) Oral at bedtime PRN Insomnia  ondansetron Injectable 4 milliGRAM(s) IV Push every 8 hours PRN Nausea and/or Vomiting         Z= -0.25)*   04/08/20 8 lb 1.8 oz (3.68 kg) (79 %, Z= 0.80)*     * Growth percentiles are based on WHO (Girls, 0-2 years) data.     10 lb 12 oz on June 3rd     Birthweight:  8 lb 5.3 oz (3.78 kg).   Today's weight:    Vitals:    05/08/20 0917 06/03/20 0916   Weight: 9 lb 6 oz (4.252 kg) 10 lb 12 oz (4.876 kg)   . This is 29% from birth weight.     Feeding assessment:     Baby can hold suction with tongue while at the breast.     Alignment: Baby's head was aligned with its trunk. Baby did face mother. Baby was in cross cradle position today.     Areolar Compression: Baby made rhythmic motion. There were no clicking or smacking sounds. There was no severe nipple discomfort.      Audible swallowing: Baby made quiet sounds of swallowing: There was an increase in frequency after milk ejection reflex. The milk ejection reflex is appropriate and milk supply appears adequate.       Assessment:    1. Breast feeding problem in infant       Anirudh is doing well and has had appropriate growth. Mom notes some major improvements since our last visit  - she is no longer using a nipple shield, they have weaned off of bottles, and Anirudh is much less fussy that previously. Mom wonders if this is due to a dairy elimination diet or normal development.     Anirudh still frequently latches on and off the breast - most likely this is due to mom's strong milk supply/fast flow. This may also be why Anirudh typically has green, mucousy stools, though we discussed that this can also be due to a dietary intolerance (mom recently noticed that she was still drinking a dairy-based coffee creamer), and is not necessarily clinically significant so long as there is no blood in the stool and baby is generally acting well.         Plan:  Continue to breastfeed on demand, at least 8-12 times a day.     I would continue to nurse Anirudh on one side at a time, and alternate which side you start on. If she finishes one side and still seems  "hungry, then offer her the second time. \"Block nursing,\" where you offer the same side for a chunk of time before switching to the other side can help moms with oversupply reduce their supply somewhat and slow a forceful milk ejection reflex. If you try this I would be somewhat cautious so that you don't overdo it and reduce your supply more than intended, but a 2 hour block might be helpful. Simply feeding her on one side at a time most of the time will have this same effect. You could do an experiment where you pump nearly to empty before nursing her for one feeding session and then see how she behaves - is she more calm at the breast? Pulls away less often? This tells you that the fast flow of milk might have been causing the frequent latching on and off. If you don't notice a difference after doing this and she continues that behavior, then fast flow is probably not the cause of the problem. Other things you can try to slow the flow of milk is to nurse her in a reclined or side lying position, apply pressure over your breast to \"block\" some of the milk ducts and slow the flow, or when she pulls off and your milk is spraying, allow that letdown to flow into a bottle or burp rag before latching her back on again.     Latch baby deeply by making a \"breast sandwich,\" and aim your nipple for the roof of the mouth. If baby's lips are rolled inward, flip the top lip out with your finger, and then apply gentle downward pressure to the chin to help the lips flange out like \"fish lips.\" If you have pain that lasts beyond the initial latch-on, always restart. When sucking/swallowing frequency starts to slow down, do breast compressions/massage and tickle baby's feet to keep them alert with feeding. A diaper change between sides can be helpful to keep them alert.    Supplementation plan: No need to supplement. You may want to consider offering a bottle every day or every other day so that she becomes accustomed to taking a " "bottle and doesn't refuse this when it's time for you to go back to work or if you need to be away from her for a day.       Recommended to pump: No true need to pump at this point, but you could pump once a day (often after the first morning feeding is the best time) and build this into your routine so that you have some milk for a daily bottle or to save in the freezer. If you decide to pump more often than this, I would recommend trying to be consistent with a pumping routine so that you're pumping the same number of times most days and around the same time of day. Always nurse her first before pumping.     Continue to monitor output, expect at least 6 wet diapers per day.   Recommended Vitamin D 400 IU daily.        Follow up as needed for ongoing lactation concerns     As for dairy - since this has seemed to helpful, I would continue a dairy elimination for a few more days and see how she does - if you notice that she seems to be less fussy/ poop looks less mucousy / she is sleeping better after a few days of a full dairy elimination (including the coffee creamer) then I would assume that this is helping her and avoid dairy for at least a couple of months before doing a dairy challenge. If you notice no difference, then if may be worthwhile to reintroduce dairy to see if this was the culprit at all, or if she was just going through the 6 week fussiness peak. I would continue to eat dairy free and give her some of your \"dairy contaminated' milk for a day or two and see how this goes. If she seems fussier/ worse poop etc then continue with the dairy elimination for now. If she seems no different, then you can try reintroducing dairy to your diet. This is the \"dairy ladder\" (the order of reintroduction) in case you want to take a slower approach and make sure she's tolerating it along the way. It's generally recommended to go 3 days between each step. Here's a blog post that includes some evidence based " "information about doing this: https://[employer]ish/milk-ladder-guide/    Dairy Ladder:   Step 1: Baked in milk (like in a cookie or biscuit)  Step 2: Butter  Step 3: Cheese  Step 4: Yogurt  Step 5: Cow's milk       The [employer] - will help you identify hidden sources of dairy in packaged foods       Lactation Line: [phone]    There are things you can do to help your baby learn to sleep well.     My favorite book on sleep is \"Healthy Sleep Habits, Happy Child\" by Dr. David Ventura. It is long but evidence-based and full of helpful information. There is a \"summary for exhausted parents\" at the end of each chapter.     Sleep tips:   1. Start early trying to put your baby down to sleep while drowsy but awake. Watch your baby for sleepy cues and then put them down to sleep before they have completely fallen asleep in your arms. This may not work every time you try, and that is ok. Just try again at the next nap or bedtime. Some babies will cry or whimper softly before falling asleep on their own, this is not the same thing as \"cry it out.\"     2. When your baby wakes during the night, you can pause briefly before you get up to feed them. Some babies will make a little noise or cry briefly and then put themselves back to sleep. When you do get up with your baby, keep interactions quiet and brief. Keep lights very dim. A red nightlight can help keep everyone in \"nighttime\" mode. Bright lights during the day will help baby sort out days and nights.      3. Start a consistent bedtime routine early on, as early as 6 weeks. A quick bath, putting on pajamas, and feeding is a common bedtime routine. Try to keep the lights low and the room quiet before bedtime. The routine you choose is less important than that it is the same every day. Use an abbreviated form of the bedtime routine when you put your baby down for a nap.     4. Around 6 weeks, many babies are ready for an early bedtime. Some parents believe that keeping " "their baby up later will help them sleep longer, but this will often backfire. By 4 months nearly all babies can benefit from an early bedtime between 6 and 8 pm.     5. Sleep begets sleep. If your baby is able to have good naps during the day, night sleep will often go better.     6. Get help! Taking care of a baby is very hard, and when you are sleep deprived, everything is harder! Postpartum depression and sleep deprivation are closely linked. Do what you can to protect your own sleep, like napping during the day, going to bed early, and sharing responsibilities with another caregiver.    7. Some families find that co-sleeping for a season helps everyone get more sleep. Please see the [employer] \"Safe Sleep 7\" for how to do this as safely as possible.         This visit was completed virtually by video call due to the coronavirus pandemic in an effort to minimize risk of transmission by limiting clinic visits.       The visit lasted a total of 55 minutes that I spent over video chat with the patient. Of that time, 55 minutes were spent on lactation. Over 50% of the time was spent counseling and educating the patient about feeding difficulties and lactation concerns.     Completed by:   CARLOS Moore, IBCLC, [facility] - remotely from home, Pediatrics.  2020 9:06 AM                          Video-Visit Details    Type of service:  Video Visit    Video End Time (time video stopped): 10:00 AM  Originating Location (pt. Location): Home    Distant Location (provider location):  [facility] PEDIATRICS - remote from home  Platform used for Video Visit: Dieter Ramos, MARGARET    "

## 2024-12-11 NOTE — PHYSICAL THERAPY INITIAL EVALUATION ADULT - PERTINENT HX OF CURRENT PROBLEM, REHAB EVAL
74 F with PMHX CAD s/p PCI, HFrEF, Mobitz II s/p PPM (MD NereydaT), PAD with chronic right LE wound, CVA, DM2, CKD3, HTN presents to [facility] ER c/o shortness of breath/RAMIREZ and worsening LE edema admitted for Acute on Chronic HFrEF Exacerbation and Hyperkalemia.

## 2024-12-11 NOTE — PHYSICAL THERAPY INITIAL EVALUATION ADULT - ADDITIONAL COMMENTS
Patient called the office stating that she is experiencing a stabbing pain under her right rib. Patient states that moving around, taking tums and drinking water doesn't help. Patient states that she spoke with L&D and they advised her that \"it may be GERD\". Patient is requesting a phone call back to discuss. Please advise.   pt lives in a 1st floor apt in a house with no steps to enter and none inside with her spouse and son. per son, pt not left alone. has RW.

## 2024-12-11 NOTE — PHYSICAL THERAPY INITIAL EVALUATION ADULT - MANUAL MUSCLE TESTING RESULTS, REHAB EVAL
left shoulder flex, elbow flex, hip flex, knee ext, ankle df WFL; right shoulder flex 3+/5, elbow flex 3+/5, hip flex 4-/5, knee ext 4-/5, ankle df 3+/5(*pt with h/o CVA with right hemiparesis)

## 2024-12-11 NOTE — PHYSICAL THERAPY INITIAL EVALUATION ADULT - GENERAL OBSERVATIONS, REHAB EVAL
awake in bed on room air, +telemonitor with , +seth tineo present and served as ad hoc  with pt's permission.

## 2024-12-11 NOTE — PROGRESS NOTE ADULT - ASSESSMENT
74 F with PMHX CAD s/p PCI, HFrEF, Mobitz II s/p PPM (MD NereydaT), PAD with chronic right LE wound, CVA, DM2, CKD3, HTN presents to [facility] ER c/o shortness of breath/RAMIREZ and worsening LE edema admitted for Acute on Chronic HFrEF Exacerbation and Hyperkalemia.    Plan:       # Aute Hypoxic Respiratory failure secondary to   # Acute on Chronic CHF wirh reduced EF   Still requiring 2LNC, though improved  - Daily weight, I/O, salt and fluid restriction  - Supplemental O2, wean as tolerated  - Furosemide 40mg daily, changed from IV   -spironolactone   - ARNi on hold due to BRADY  - Metoprolol  - TTE reviewed  -s/p RHC and Cardiomems placement         BRADY on CKD stage 3b    Hyperkalemia at presentation, Resolved  Based on prior renla inces, Cr Cl more or ess at baseline  Permissive Azotemia with diuretics acceptable for cardiac optimization   Avoid Nephrotoxins  Trend lytes   creatinine is stable ~1.6    Anemia ( Iron deficiency)  Stable   Venofer 200 mg IVPB daily (2/3)  monitor Hgb  Outpt GI colonoscopy or screening   Hb is ~10    CAD s/p PCI,   Mobitz Type 2 s/p MicraPPM  DAPT, Statin, BB, Nitrate and antianginal  going for PCA    Diabetes Mellitus type2 on insulin with neuropathy   A1c 7.1  Improving   Hold oral hypoglycemic agents  Blood glucose monitoring with sliding scale  Glargine 26U  Lispro 5U premeal tid  Lispro s/s   Gabapentin 300mg qHS    Foot pain: xrays done shows no fracture, CT scan is negative for fracture    UTI: she has burning urination, UA suggestive of UTI, has been started on Ceftriaxone, will get urine cultures.      VTE Prophylaxis: SCDs/SQH [id number]    DISPO: Likely LALA, PT eval, Urine cultures to follow

## 2024-12-12 LAB
ANION GAP SERPL CALC-SCNC: 13 MMOL/L — SIGNIFICANT CHANGE UP (ref 5–17)
BASOPHILS # BLD AUTO: 0.07 K/UL — SIGNIFICANT CHANGE UP (ref 0–0.2)
BASOPHILS NFR BLD AUTO: 0.6 % — SIGNIFICANT CHANGE UP (ref 0–2)
BUN SERPL-MCNC: 36.2 MG/DL — HIGH (ref 8–20)
CALCIUM SERPL-MCNC: 9.6 MG/DL — SIGNIFICANT CHANGE UP (ref 8.4–10.5)
CHLORIDE SERPL-SCNC: 98 MMOL/L — SIGNIFICANT CHANGE UP (ref 96–108)
CO2 SERPL-SCNC: 22 MMOL/L — SIGNIFICANT CHANGE UP (ref 22–29)
CREAT SERPL-MCNC: 1.56 MG/DL — HIGH (ref 0.5–1.3)
EGFR: 35 ML/MIN/1.73M2 — LOW
EOSINOPHIL # BLD AUTO: 0.6 K/UL — HIGH (ref 0–0.5)
EOSINOPHIL NFR BLD AUTO: 4.9 % — SIGNIFICANT CHANGE UP (ref 0–6)
GLUCOSE BLDC GLUCOMTR-MCNC: 159 MG/DL — HIGH (ref 70–99)
GLUCOSE BLDC GLUCOMTR-MCNC: 227 MG/DL — HIGH (ref 70–99)
GLUCOSE BLDC GLUCOMTR-MCNC: 260 MG/DL — HIGH (ref 70–99)
GLUCOSE BLDC GLUCOMTR-MCNC: 270 MG/DL — HIGH (ref 70–99)
GLUCOSE SERPL-MCNC: 210 MG/DL — HIGH (ref 70–99)
HCT VFR BLD CALC: 34.2 % — LOW (ref 34.5–45)
HGB BLD-MCNC: 10.9 G/DL — LOW (ref 11.5–15.5)
IMM GRANULOCYTES NFR BLD AUTO: 1 % — HIGH (ref 0–0.9)
LYMPHOCYTES # BLD AUTO: 16.9 % — SIGNIFICANT CHANGE UP (ref 13–44)
LYMPHOCYTES # BLD AUTO: 2.08 K/UL — SIGNIFICANT CHANGE UP (ref 1–3.3)
MAGNESIUM SERPL-MCNC: 2.4 MG/DL — SIGNIFICANT CHANGE UP (ref 1.6–2.6)
MCHC RBC-ENTMCNC: 27.3 PG — SIGNIFICANT CHANGE UP (ref 27–34)
MCHC RBC-ENTMCNC: 31.9 G/DL — LOW (ref 32–36)
MCV RBC AUTO: 85.5 FL — SIGNIFICANT CHANGE UP (ref 80–100)
MONOCYTES # BLD AUTO: 1.13 K/UL — HIGH (ref 0–0.9)
MONOCYTES NFR BLD AUTO: 9.2 % — SIGNIFICANT CHANGE UP (ref 2–14)
NEUTROPHILS # BLD AUTO: 8.3 K/UL — HIGH (ref 1.8–7.4)
NEUTROPHILS NFR BLD AUTO: 67.4 % — SIGNIFICANT CHANGE UP (ref 43–77)
PHOSPHATE SERPL-MCNC: 4.4 MG/DL — SIGNIFICANT CHANGE UP (ref 2.4–4.7)
PLATELET # BLD AUTO: 462 K/UL — HIGH (ref 150–400)
POTASSIUM SERPL-MCNC: 4.9 MMOL/L — SIGNIFICANT CHANGE UP (ref 3.5–5.3)
POTASSIUM SERPL-SCNC: 4.9 MMOL/L — SIGNIFICANT CHANGE UP (ref 3.5–5.3)
RBC # BLD: 4 M/UL — SIGNIFICANT CHANGE UP (ref 3.8–5.2)
RBC # FLD: 19.8 % — HIGH (ref 10.3–14.5)
SODIUM SERPL-SCNC: 133 MMOL/L — LOW (ref 135–145)
TROPONIN T, HIGH SENSITIVITY RESULT: 28 NG/L — SIGNIFICANT CHANGE UP (ref 0–51)
WBC # BLD: 12.3 K/UL — HIGH (ref 3.8–10.5)
WBC # FLD AUTO: 12.3 K/UL — HIGH (ref 3.8–10.5)

## 2024-12-12 PROCEDURE — 93010 ELECTROCARDIOGRAM REPORT: CPT

## 2024-12-12 PROCEDURE — 99233 SBSQ HOSP IP/OBS HIGH 50: CPT

## 2024-12-12 RX ORDER — HYDRALAZINE HYDROCHLORIDE 10 MG/1
10 TABLET ORAL ONCE
Refills: 0 | Status: DISCONTINUED | OUTPATIENT
Start: 2024-12-12 | End: 2024-12-12

## 2024-12-12 RX ORDER — MEROPENEM 500 MG/1
1000 INJECTION, POWDER, FOR SOLUTION INTRAVENOUS EVERY 12 HOURS
Refills: 0 | Status: COMPLETED | OUTPATIENT
Start: 2024-12-12 | End: 2024-12-15

## 2024-12-12 RX ORDER — MEROPENEM 500 MG/1
1000 INJECTION, POWDER, FOR SOLUTION INTRAVENOUS ONCE
Refills: 0 | Status: COMPLETED | OUTPATIENT
Start: 2024-12-12 | End: 2024-12-12

## 2024-12-12 RX ORDER — MEROPENEM 500 MG/1
INJECTION, POWDER, FOR SOLUTION INTRAVENOUS
Refills: 0 | Status: COMPLETED | OUTPATIENT
Start: 2024-12-12 | End: 2024-12-15

## 2024-12-12 RX ADMIN — ACETAMINOPHEN 500MG 650 MILLIGRAM(S): 500 TABLET, COATED ORAL at 18:34

## 2024-12-12 RX ADMIN — Medication 5000 UNIT(S): at 22:22

## 2024-12-12 RX ADMIN — ACETAMINOPHEN 500MG 650 MILLIGRAM(S): 500 TABLET, COATED ORAL at 03:24

## 2024-12-12 RX ADMIN — CLOPIDOGREL 75 MILLIGRAM(S): 75 TABLET, FILM COATED ORAL at 10:17

## 2024-12-12 RX ADMIN — Medication 5 UNIT(S): at 14:11

## 2024-12-12 RX ADMIN — METOPROLOL TARTRATE 100 MILLIGRAM(S): 100 TABLET, FILM COATED ORAL at 05:57

## 2024-12-12 RX ADMIN — Medication 5 UNIT(S): at 18:20

## 2024-12-12 RX ADMIN — Medication 25 MILLIGRAM(S): at 05:57

## 2024-12-12 RX ADMIN — Medication 80 MILLIGRAM(S): at 22:22

## 2024-12-12 RX ADMIN — MEROPENEM 1000 MILLIGRAM(S): 500 INJECTION, POWDER, FOR SOLUTION INTRAVENOUS at 22:22

## 2024-12-12 RX ADMIN — Medication 3: at 22:24

## 2024-12-12 RX ADMIN — GABAPENTIN 300 MILLIGRAM(S): 300 CAPSULE ORAL at 22:23

## 2024-12-12 RX ADMIN — Medication 60 MILLIGRAM(S): at 10:17

## 2024-12-12 RX ADMIN — ACETAMINOPHEN 500MG 650 MILLIGRAM(S): 500 TABLET, COATED ORAL at 05:24

## 2024-12-12 RX ADMIN — FUROSEMIDE 40 MILLIGRAM(S): 40 TABLET ORAL at 05:57

## 2024-12-12 RX ADMIN — Medication 30 MILLILITER(S): at 22:23

## 2024-12-12 RX ADMIN — INSULIN GLARGINE 26 UNIT(S): 100 INJECTION, SOLUTION SUBCUTANEOUS at 22:25

## 2024-12-12 RX ADMIN — RANOLAZINE 500 MILLIGRAM(S): 1000 TABLET, FILM COATED, EXTENDED RELEASE ORAL at 18:21

## 2024-12-12 RX ADMIN — RANOLAZINE 500 MILLIGRAM(S): 1000 TABLET, FILM COATED, EXTENDED RELEASE ORAL at 05:57

## 2024-12-12 RX ADMIN — ACETAMINOPHEN 500MG 650 MILLIGRAM(S): 500 TABLET, COATED ORAL at 19:04

## 2024-12-12 RX ADMIN — MEROPENEM 1000 MILLIGRAM(S): 500 INJECTION, POWDER, FOR SOLUTION INTRAVENOUS at 14:06

## 2024-12-12 RX ADMIN — Medication 30 MILLILITER(S): at 03:23

## 2024-12-12 RX ADMIN — Medication 5 UNIT(S): at 10:25

## 2024-12-12 RX ADMIN — Medication 1: at 14:11

## 2024-12-12 RX ADMIN — AMLODIPINE BESYLATE 10 MILLIGRAM(S): 10 TABLET ORAL at 05:57

## 2024-12-12 RX ADMIN — Medication 81 MILLIGRAM(S): at 10:17

## 2024-12-12 RX ADMIN — Medication 2: at 10:17

## 2024-12-12 RX ADMIN — Medication 5000 UNIT(S): at 05:57

## 2024-12-12 RX ADMIN — Medication 3: at 18:17

## 2024-12-12 RX ADMIN — Medication 5000 UNIT(S): at 14:06

## 2024-12-12 NOTE — CHART NOTE - NSCHARTNOTEFT_GEN_A_CORE
Notified by RN patient C/O chest tightness. Patient is a 74 F with PMHx CAD s/p PCI, HFrEF, Mobitz II s/p PPM (Nereyad, MDT), PAD with chronic right LE wound, CVA, DM2, CKD3, HTN presents to [facility] c/o shortness of breath/RAMIREZ and worsening LE edema admitted for Acute on Chronic HFrEF Exacerbation and Hyperkalemia. Followed by [facility].     Patient seen and evaluated at bedside. The pain began a few minutes ago, and is similar to the chest tightness she has had on this admission. The chest tightness is central in location and does not radiate. She states last time this happened she got a medication and it helped the pain go away. She admits to night sweats overnight, has not had them prior, however no fever at this time and does not feel like she is getting sick. The chest pain is not associated with palpitations, shortness of breath, diaphoresis, lightheadedness, or nausea.     Vital Signs Last 24 Hrs  T(C): 36.8 (12 Dec 2024 03:38), Max: 36.8 (12 Dec 2024 03:38)  T(F): 98.3 (12 Dec 2024 03:38), Max: 98.3 (12 Dec 2024 03:38)  HR: 63 (12 Dec 2024 03:38) (63 - 81)  BP: 155/76 (12 Dec 2024 03:38) (120/69 - 187/95)  BP(mean): 102 (12 Dec 2024 03:38) (101 - 126)  RR: 18 (12 Dec 2024 03:38) (16 - 18)  SpO2: 98% (12 Dec 2024 03:38) (97% - 100%)    Parameters below as of 12 Dec 2024 03:38  Patient On (Oxygen Delivery Method): room air      PHYSICAL EXAM:  GENERAL: Pt lying in bed comfortably in NAD  HEAD:  Atraumatic   EYES: EOMI, conjunctiva and sclera clear  ENT: Moist mucous membranes  NECK: Supple, No JVD  CHEST/LUNG: Clear to auscultation bilaterally; No rales, rhonchi, wheezing or rubs. Unlabored respirations  HEART: Regular rate and rhythm  ABDOMEN: Bowel sounds present; Soft, Nontender, Nondistended. No guarding or rigidity    NERVOUS SYSTEM:  Alert & Oriented X3, speech clear. No deficits                             10.3   9.66  )-----------( 460      ( 11 Dec 2024 04:53 )             32.0     12-11    136  |  103  |  38.7[H]  ----------------------------<  154[H]  4.8   |  21.0[L]  |  1.65[H]    Ca    9.7      11 Dec 2024 04:53    TPro  7.1  /  Alb  3.6  /  TBili  0.3[L]  /  DBili  x   /  AST  12  /  ALT  8   /  AlkPhos  101  12-11      EKG: poor quality however no acute finds    A/P: Patient c/o chest tightness overnight, similar to chest tightness she has had on this admission, and is now resolved s/p maalox. No events on tele monitor. Night sweats noted but no other associated symptoms, vitals stable, patient comfortable.  -CBC, BMP, Mag, Phos, Trop  -Tele/  -Maalox PRN    RN to call with questions or concerns  Will endorse to day team Notified by RN patient C/O chest tightness. Patient is a 74 F with PMHx CAD s/p PCI, HFrEF, Mobitz II s/p PPM (Nereyda, MDT), PAD with chronic right LE wound, CVA, DM2, CKD3, HTN presents to [facility] ER c/o shortness of breath/RAMIREZ and worsening LE edema admitted for Acute on Chronic HFrEF Exacerbation and Hyperkalemia. Followed by [facility].     Patient seen and evaluated at bedside. The pain began a few minutes ago, and is similar to the chest tightness she has had on this admission. The chest tightness is central in location and does not radiate. She states last time this happened she got a medication and it helped the pain go away. She admits to night sweats overnight, has not had them prior, however no fever at this time and does not feel like she is getting sick. The chest pain is not associated with palpitations, shortness of breath, diaphoresis, lightheadedness, or nausea.     Vital Signs Last 24 Hrs  T(C): 36.8 (12 Dec 2024 03:38), Max: 36.8 (12 Dec 2024 03:38)  T(F): 98.3 (12 Dec 2024 03:38), Max: 98.3 (12 Dec 2024 03:38)  HR: 63 (12 Dec 2024 03:38) (63 - 81)  BP: 155/76 (12 Dec 2024 03:38) (120/69 - 187/95)  BP(mean): 102 (12 Dec 2024 03:38) (101 - 126)  RR: 18 (12 Dec 2024 03:38) (16 - 18)  SpO2: 98% (12 Dec 2024 03:38) (97% - 100%)    Parameters below as of 12 Dec 2024 03:38  Patient On (Oxygen Delivery Method): room air      PHYSICAL EXAM:  GENERAL: Pt lying in bed comfortably in NAD  HEAD:  Atraumatic   EYES: EOMI, conjunctiva and sclera clear  ENT: Moist mucous membranes  NECK: Supple, No JVD  CHEST/LUNG: Clear to auscultation bilaterally; No rales, rhonchi, wheezing or rubs. Unlabored respirations  HEART: Regular rate and rhythm  ABDOMEN: Bowel sounds present; Soft, Nontender, Nondistended. No guarding or rigidity    NERVOUS SYSTEM:  Alert & Oriented X3, speech clear. No deficits                                        10.9   12.30 )-----------( 462      ( 12 Dec 2024 04:33 )             34.2   12-12    133[L]  |  98  |  36.2[H]  ----------------------------<  210[H]  4.9   |  22.0  |  1.56[H]    Ca    9.6      12 Dec 2024 04:33  Phos  4.4     12-12  Mg     2.4     12-12    TPro  7.1  /  Alb  3.6  /  TBili  0.3[L]  /  DBili  x   /  AST  12  /  ALT  8   /  AlkPhos  101  12-11        EKG: poor quality however no acute finds    A/P: Patient c/o chest tightness overnight, similar to chest tightness she has had on this admission, and is now resolved s/p maalox. No events on tele monitor. Night sweats noted but no other associated symptoms, vitals stable, patient comfortable.  -CBC, BMP, Mag, Phos, Trop  -Tele/  -Maalox PRN    RN to call with questions or concerns  Will endorse to day team Notified by RN patient C/O chest tightness. Patient is a 74 F with PMHx CAD s/p PCI, HFrEF, Mobitz II s/p PPM (Nereyda, MDT), PAD with chronic right LE wound, CVA, DM2, CKD3, HTN presents to [facility] ER c/o shortness of breath/RAMIREZ and worsening LE edema admitted for Acute on Chronic HFrEF Exacerbation and Hyperkalemia. Followed by [facility].     Patient seen and evaluated at bedside. The pain began a few minutes ago, and is similar to the chest tightness she has had on this admission. The chest tightness is central in location and does not radiate. She states last time this happened she got a medication and it helped the pain go away. She admits to night sweats overnight, has not had them prior, however no fever at this time and does not feel like she is getting sick. The chest pain is not associated with palpitations, shortness of breath, diaphoresis, lightheadedness, or nausea.     Vital Signs Last 24 Hrs  T(C): 36.8 (12 Dec 2024 03:38), Max: 36.8 (12 Dec 2024 03:38)  T(F): 98.3 (12 Dec 2024 03:38), Max: 98.3 (12 Dec 2024 03:38)  HR: 63 (12 Dec 2024 03:38) (63 - 81)  BP: 155/76 (12 Dec 2024 03:38) (120/69 - 187/95)  BP(mean): 102 (12 Dec 2024 03:38) (101 - 126)  RR: 18 (12 Dec 2024 03:38) (16 - 18)  SpO2: 98% (12 Dec 2024 03:38) (97% - 100%)    Parameters below as of 12 Dec 2024 03:38  Patient On (Oxygen Delivery Method): room air      PHYSICAL EXAM:  GENERAL: Pt lying in bed comfortably in NAD  HEAD:  Atraumatic   EYES: EOMI, conjunctiva and sclera clear  ENT: Moist mucous membranes  NECK: Supple, No JVD  CHEST/LUNG: Clear to auscultation bilaterally; No rales, rhonchi, wheezing or rubs. Unlabored respirations  HEART: Regular rate and rhythm  ABDOMEN: Bowel sounds present; Soft, Nontender, Nondistended. No guarding or rigidity    NERVOUS SYSTEM:  Alert & Oriented X3, speech clear. No deficits                                        10.9   12.30 )-----------( 462      ( 12 Dec 2024 04:33 )             34.2   12-12    133[L]  |  98  |  36.2[H]  ----------------------------<  210[H]  4.9   |  22.0  |  1.56[H]    Ca    9.6      12 Dec 2024 04:33  Phos  4.4     12-12  Mg     2.4     12-12    TPro  7.1  /  Alb  3.6  /  TBili  0.3[L]  /  DBili  x   /  AST  12  /  ALT  8   /  AlkPhos  101  12-11    Trop: 28, trending down        EKG: poor quality however no acute finds    A/P: Patient c/o chest tightness overnight, similar to chest tightness she has had on this admission, and is now resolved s/p maalox. No events on tele monitor. Night sweats noted but no other associated symptoms, vitals stable, patient comfortable.  -CBC, BMP, Mag, Phos, Trop  -Tele/  -Maalox PRN    RN to call with questions or concerns  Will endorse to day team

## 2024-12-12 NOTE — PROGRESS NOTE ADULT - SUBJECTIVE AND OBJECTIVE BOX
SHIREEN CASH    [id number]    74y      Female    Patient is a 74y old  Female who presents with a chief complaint of Acute on Chronic HFrEF Exacerbation      INTERVAL HPI/OVERNIGHT EVENTS:  Pt exmained laying in bed  Urine cx reviewed. ESBL E.Coli  No further CP, SOB, abd pain     Vital Signs Last 24 Hrs  T(C): 36.4 (11 Dec 2024 16:39), Max: 36.7 (10 Dec 2024 21:42)  T(F): 97.5 (11 Dec 2024 16:39), Max: 98.1 (10 Dec 2024 21:42)  HR: 65 (11 Dec 2024 16:39) (62 - 88)  BP: 162/87 (11 Dec 2024 16:39) (113/62 - 162/87)  BP(mean): 112 (11 Dec 2024 16:39) (105 - 112)  RR: 16 (11 Dec 2024 16:39) (16 - 18)  SpO2: 100% (11 Dec 2024 16:39) (95% - 100%)    Parameters below as of 11 Dec 2024 16:39  Patient On (Oxygen Delivery Method): room air        PHYSICAL EXAM:    GENERAL: Elderly female looking comfortable   HEENT: PERRL, +EOMI  NECK: soft, Supple, No JVD  CHEST/LUNG: Clear to auscultate bilaterally; No wheezing  HEART: S1S2+, Regular rate and rhythm; No murmurs  ABDOMEN: Soft, Nontender, Nondistended; Bowel sounds present  EXTREMITIES:  1+ Peripheral Pulses, No edema  SKIN: ecchymosis of the right foot   NEURO: AAOX3  PSYCH: normal mood        LABS:                                   10.9   12.30 )-----------( 462      ( 12 Dec 2024 04:33 )             34.2   12-12    133[L]  |  98  |  36.2[H]  ----------------------------<  210[H]  4.9   |  22.0  |  1.56[H]    Ca    9.6      12 Dec 2024 04:33  Phos  4.4     12-12  Mg     2.4     12-12    TPro  7.1  /  Alb  3.6  /  TBili  0.3[L]  /  DBili  x   /  AST  12  /  ALT  8   /  AlkPhos  101  12-11      Micro : Ureine cx : ESBL E.Coli       MEDICATIONS  (STANDING):  amLODIPine   Tablet 10 milliGRAM(s) Oral daily  aspirin enteric coated 81 milliGRAM(s) Oral daily  atorvastatin 80 milliGRAM(s) Oral at bedtime  clopidogrel Tablet 75 milliGRAM(s) Oral daily  dextrose 5%. 1000 milliLiter(s) (50 mL/Hr) IV Continuous <Continuous>  dextrose 5%. 1000 milliLiter(s) (100 mL/Hr) IV Continuous <Continuous>  dextrose 50% Injectable 25 Gram(s) IV Push once  dextrose 50% Injectable 12.5 Gram(s) IV Push once  dextrose 50% Injectable 25 Gram(s) IV Push once  furosemide    Tablet 40 milliGRAM(s) Oral daily  gabapentin 300 milliGRAM(s) Oral at bedtime  glucagon  Injectable 1 milliGRAM(s) IntraMuscular once  heparin   Injectable 5000 Unit(s) SubCutaneous every 8 hours  insulin glargine Injectable (LANTUS) 26 Unit(s) SubCutaneous at bedtime  insulin lispro (ADMELOG) corrective regimen sliding scale   SubCutaneous Before meals and at bedtime  insulin lispro Injectable (ADMELOG) 5 Unit(s) SubCutaneous three times a day before meals  isosorbide   mononitrate ER Tablet (IMDUR) 60 milliGRAM(s) Oral daily  meropenem Injectable      meropenem Injectable 1000 milliGRAM(s) IV Push every 12 hours  metoprolol succinate  milliGRAM(s) Oral daily  ranolazine 500 milliGRAM(s) Oral two times a day  spironolactone 25 milliGRAM(s) Oral daily    MEDICATIONS  (PRN):  acetaminophen     Tablet .. 650 milliGRAM(s) Oral every 6 hours PRN Temp greater or equal to 38C (100.4F), Mild Pain (1 - 3)  aluminum hydroxide/magnesium hydroxide/simethicone Suspension 30 milliLiter(s) Oral every 4 hours PRN Dyspepsia  dextrose Oral Gel 15 Gram(s) Oral once PRN Blood Glucose LESS THAN 70 milliGRAM(s)/deciliter  melatonin 3 milliGRAM(s) Oral at bedtime PRN Insomnia  ondansetron Injectable 4 milliGRAM(s) IV Push every 8 hours PRN Nausea and/or Vomiting

## 2024-12-12 NOTE — PROGRESS NOTE ADULT - ASSESSMENT
74 F with PMHX CAD s/p PCI, HFrEF, Mobitz II s/p PPM (MD NereydaT), PAD with chronic right LE wound, CVA, DM2, CKD3, HTN presents to [facility] ER c/o shortness of breath/RAMIREZ and worsening LE edema admitted for Acute on Chronic HFrEF Exacerbation and Hyperkalemia. Now s;/p diuresis and cardiomems placement with improvement. Found to have ESBL E.Coli UTI     Aute Hypoxic Respiratory failure secondary to   Acute on Chronic CHF wirh reduced EF 2/2 isch CM s/p PCIs with MVD / CAD  Resolved, comfortable on RA now   Daily weight, I/O, salt and fluid restriction  Supplemental O2, wean as tolerated  Furosemide 40mg PO daily  spironolactone 25 mg daily   Continue DAPT   Continue Ranexa   Continue Statin     ARNi on hold due to BRADY  Metoprolol  TTE reviewed  s/p RHC and Cardiomems placement    BRADY on CKD stage 3b   Hyperkalemia at presentation, Resolved  Based on prior renla inces, Cr Cl more or ess at baseline  Permissive Azotemia with diuretics acceptable for cardiac optimization   Avoid Nephrotoxins  Trend lytes   Pt appears to be at baseline, Will d/w Renal about intrducing low dose ARB vs ARNI     Anemia ( Iron deficiency)  Stable   Venofer 200 mg IVPB daily (2/3)  monitor Hgb  Outpt GI colonoscopy or screening   Hb is ~10    CAD s/p PCI,   Mobitz Type 2 s/p MicraPPM  DAPT, Statin, BB, Nitrate and antianginal    Recurring UTIs  Per son, h/o recurrent UTIs  Now with ESBL E.Coli. No oral options  Ceftrriaxone dc'd. Started on Meropenem IV     Diabetes Mellitus type2 on insulin with neuropathy   A1c 7.1  Improving   Hold oral hypoglycemic agents  Blood glucose monitoring with sliding scale  Glargine 26U  Lispro 5U premeal tid  Lispro s/s   Gabapentin 300mg qHS    Foot pain: xrays done shows no fracture, CT scan is negative for fracture    UTI: she has burning urination, UA suggestive of UTI, has been started on Ceftriaxone, will get urine cultures.      VTE Prophylaxis: SCDs/SQH 5000q8    DISPO: Likely LALA,

## 2024-12-13 LAB
ANION GAP SERPL CALC-SCNC: 13 MMOL/L — SIGNIFICANT CHANGE UP (ref 5–17)
BUN SERPL-MCNC: 37.8 MG/DL — HIGH (ref 8–20)
CALCIUM SERPL-MCNC: 9.6 MG/DL — SIGNIFICANT CHANGE UP (ref 8.4–10.5)
CHLORIDE SERPL-SCNC: 100 MMOL/L — SIGNIFICANT CHANGE UP (ref 96–108)
CO2 SERPL-SCNC: 23 MMOL/L — SIGNIFICANT CHANGE UP (ref 22–29)
CREAT SERPL-MCNC: 1.59 MG/DL — HIGH (ref 0.5–1.3)
EGFR: 34 ML/MIN/1.73M2 — LOW
GLUCOSE BLDC GLUCOMTR-MCNC: 156 MG/DL — HIGH (ref 70–99)
GLUCOSE BLDC GLUCOMTR-MCNC: 191 MG/DL — HIGH (ref 70–99)
GLUCOSE BLDC GLUCOMTR-MCNC: 213 MG/DL — HIGH (ref 70–99)
GLUCOSE BLDC GLUCOMTR-MCNC: 245 MG/DL — HIGH (ref 70–99)
GLUCOSE SERPL-MCNC: 228 MG/DL — HIGH (ref 70–99)
HCT VFR BLD CALC: 32.8 % — LOW (ref 34.5–45)
HGB BLD-MCNC: 10.6 G/DL — LOW (ref 11.5–15.5)
MAGNESIUM SERPL-MCNC: 2.6 MG/DL — SIGNIFICANT CHANGE UP (ref 1.8–2.6)
MCHC RBC-ENTMCNC: 27.2 PG — SIGNIFICANT CHANGE UP (ref 27–34)
MCHC RBC-ENTMCNC: 32.3 G/DL — SIGNIFICANT CHANGE UP (ref 32–36)
MCV RBC AUTO: 84.1 FL — SIGNIFICANT CHANGE UP (ref 80–100)
PLATELET # BLD AUTO: 411 K/UL — HIGH (ref 150–400)
POTASSIUM SERPL-MCNC: 5.5 MMOL/L — HIGH (ref 3.5–5.3)
POTASSIUM SERPL-SCNC: 5.5 MMOL/L — HIGH (ref 3.5–5.3)
RBC # BLD: 3.9 M/UL — SIGNIFICANT CHANGE UP (ref 3.8–5.2)
RBC # FLD: 19.6 % — HIGH (ref 10.3–14.5)
SODIUM SERPL-SCNC: 136 MMOL/L — SIGNIFICANT CHANGE UP (ref 135–145)
WBC # BLD: 8.54 K/UL — SIGNIFICANT CHANGE UP (ref 3.8–10.5)
WBC # FLD AUTO: 8.54 K/UL — SIGNIFICANT CHANGE UP (ref 3.8–10.5)

## 2024-12-13 PROCEDURE — 99233 SBSQ HOSP IP/OBS HIGH 50: CPT

## 2024-12-13 RX ADMIN — Medication 60 MILLIGRAM(S): at 12:51

## 2024-12-13 RX ADMIN — Medication 5000 UNIT(S): at 21:19

## 2024-12-13 RX ADMIN — MEROPENEM 1000 MILLIGRAM(S): 500 INJECTION, POWDER, FOR SOLUTION INTRAVENOUS at 18:38

## 2024-12-13 RX ADMIN — Medication 5 UNIT(S): at 09:32

## 2024-12-13 RX ADMIN — AMLODIPINE BESYLATE 10 MILLIGRAM(S): 10 TABLET ORAL at 05:31

## 2024-12-13 RX ADMIN — CLOPIDOGREL 75 MILLIGRAM(S): 75 TABLET, FILM COATED ORAL at 12:51

## 2024-12-13 RX ADMIN — Medication 5 UNIT(S): at 18:39

## 2024-12-13 RX ADMIN — Medication 2: at 09:32

## 2024-12-13 RX ADMIN — GABAPENTIN 300 MILLIGRAM(S): 300 CAPSULE ORAL at 21:19

## 2024-12-13 RX ADMIN — Medication 81 MILLIGRAM(S): at 12:51

## 2024-12-13 RX ADMIN — MEROPENEM 1000 MILLIGRAM(S): 500 INJECTION, POWDER, FOR SOLUTION INTRAVENOUS at 05:30

## 2024-12-13 RX ADMIN — Medication 1: at 12:51

## 2024-12-13 RX ADMIN — INSULIN GLARGINE 26 UNIT(S): 100 INJECTION, SOLUTION SUBCUTANEOUS at 21:19

## 2024-12-13 RX ADMIN — METOPROLOL TARTRATE 100 MILLIGRAM(S): 100 TABLET, FILM COATED ORAL at 05:31

## 2024-12-13 RX ADMIN — Medication 5 UNIT(S): at 12:52

## 2024-12-13 RX ADMIN — Medication 25 MILLIGRAM(S): at 05:31

## 2024-12-13 RX ADMIN — RANOLAZINE 500 MILLIGRAM(S): 1000 TABLET, FILM COATED, EXTENDED RELEASE ORAL at 18:38

## 2024-12-13 RX ADMIN — ACETAMINOPHEN 500MG 650 MILLIGRAM(S): 500 TABLET, COATED ORAL at 15:05

## 2024-12-13 RX ADMIN — FUROSEMIDE 40 MILLIGRAM(S): 40 TABLET ORAL at 05:31

## 2024-12-13 RX ADMIN — RANOLAZINE 500 MILLIGRAM(S): 1000 TABLET, FILM COATED, EXTENDED RELEASE ORAL at 05:31

## 2024-12-13 RX ADMIN — Medication 1: at 21:27

## 2024-12-13 RX ADMIN — ACETAMINOPHEN 500MG 650 MILLIGRAM(S): 500 TABLET, COATED ORAL at 14:05

## 2024-12-13 RX ADMIN — Medication 80 MILLIGRAM(S): at 21:19

## 2024-12-13 RX ADMIN — Medication 5000 UNIT(S): at 05:30

## 2024-12-13 RX ADMIN — Medication 2: at 18:38

## 2024-12-13 RX ADMIN — Medication 5000 UNIT(S): at 14:06

## 2024-12-13 NOTE — PROGRESS NOTE ADULT - ASSESSMENT
74 F with PMHX CAD s/p PCI, HFrEF, Mobitz II s/p PPM (MIRNA Dawn), PAD with chronic right LE wound, CVA, DM2, CKD3, HTN presents to [facility] ER c/o shortness of breath/RAMIREZ and worsening LE edema admitted for Acute on Chronic HFrEF Exacerbation and Hyperkalemia. Now s;/p diuresis and cardiomems placement with improvement. Found to have ESBL E.Coli UTI     Aute Hypoxic Respiratory failure secondary to   Acute on Chronic CHF wirh reduced EF 2/2 isch CM s/p PCIs with MVD / CAD  Resolved, comfortable on RA now   Daily weight, I/O, salt and fluid restriction  Supplemental O2, wean as tolerated  Furosemide 40mg PO daily  spironolactone 25 mg daily   Continue DAPT   Continue Ranexa   Continue Statin     ARNi on hold due to BRADY  Metoprolol  TTE reviewed  s/p RHC and Cardiomems placement    BRADY on CKD stage 3b   Hyperkalemia at presentation, Resolved  Based on prior renla inces, Cr Cl more or ess at baseline  Permissive Azotemia with diuretics acceptable for cardiac optimization   Avoid Nephrotoxins  Trend lytes   Pt appears to be at baseline, Will d/w Renal about intrducing low dose ARB vs ARNI     Anemia ( Iron deficiency)  Stable   Venofer 200 mg IVPB daily (2/3)  monitor Hgb  Outpt GI colonoscopy or screening   Hb is ~10    CAD s/p PCI,   Mobitz Type 2 s/p MicraPPM  DAPT, Statin, BB, Nitrate and antianginal    Recurring UTIs  Per son, h/o recurrent UTIs  Now with ESBL E.Coli. No oral options  Ceftrriaxone dc'd. 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      VTE Prophylaxis: SCDs/SQH 5000q8    DISPO: Likely LALA Monday (can complete Abx course Monday with Ertapenem in am )    74 F with PMHX CAD s/p PCI, HFrEF, Mobitz II s/p PPM (MD NereydaT), PAD with chronic right LE wound, CVA, DM2, CKD3, HTN presents to [facility] ER c/o shortness of breath/RAMIREZ and worsening LE edema admitted for Acute on Chronic HFrEF Exacerbation and Hyperkalemia. Now s;/p diuresis and cardiomems placement with improvement. Found to have ESBL E.Coli UTI     Aute Hypoxic Respiratory failure secondary to   Acute on Chronic CHF wirh reduced EF 2/2 isch CM s/p PCIs with MVD / CAD  Resolved, comfortable on RA now   Daily weight, I/O, salt and fluid restriction  Supplemental O2, wean as tolerated  Furosemide 40mg PO daily  spironolactone 25 mg daily   Continue DAPT   Continue Ranexa   Continue Statin     ARNi on hold due to BRADY  Metoprolol  TTE reviewed  s/p RHC and Cardiomems placement    Hyperkalemia   5.5 (non hemolyzed today)  Lokelma 5 mg x 1   Decrease Aldactone to 12.5 mg daily   Repeat in am (expect improvement only AFTER pt has a BM)    BRADY on CKD stage 3b   Hyperkalemia at presentation, Resolved  Based on prior renla inces, Cr Cl more or ess at baseline  Permissive Azotemia with diuretics acceptable for cardiac optimization   Avoid Nephrotoxins  Trend lytes   Pt appears to be at baseline, Will d/w Renal about intrducing low dose ARB vs ARNI     Anemia ( Iron deficiency)  Stable   Venofer 200 mg IVPB daily (2/3)  monitor Hgb  Outpt GI colonoscopy or screening   Hb is ~10    CAD s/p PCI,   Mobitz Type 2 s/p MicraPPM  DAPT, Statin, BB, Nitrate and antianginal    Recurring UTIs  Per son, h/o recurrent UTIs  Now with ESBL E.Coli. No oral options  Ceftrriaxone dc'd. 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      VTE Prophylaxis: SCDs/SQH 5000q8    DISPO: Likely LALA Monday (can complete Abx course Monday with Ertapenem in am )

## 2024-12-13 NOTE — PROGRESS NOTE ADULT - SUBJECTIVE AND OBJECTIVE BOX
SHIREEN CASH    [id number]    74y      Female    Patient is a 74y old  Female who presents with a chief complaint of Acute on Chronic HFrEF Exacerbation      INTERVAL HPI/OVERNIGHT EVENTS:  Pt examined laying in bed  States dysuria has improved   Reports increasing foot pain   No further CP, SOB, abd pain     Vital Signs Last 24 Hrs  T(C): 36.8 (13 Dec 2024 20:40), Max: 36.8 (13 Dec 2024 20:40)  T(F): 98.3 (13 Dec 2024 20:40), Max: 98.3 (13 Dec 2024 20:40)  HR: 80 (13 Dec 2024 20:40) (67 - 86)  BP: 132/72 (13 Dec 2024 20:40) (126/77 - 166/85)  BP(mean): 92 (13 Dec 2024 20:40) (92 - 112)  RR: 18 (13 Dec 2024 20:40) (18 - 19)  SpO2: 100% (13 Dec 2024 20:40) (96% - 100%)    Parameters below as of 13 Dec 2024 20:40  Patient On (Oxygen Delivery Method): room air          PHYSICAL EXAM:    GENERAL: Elderly female looking comfortable   HEENT: PERRL, +EOMI  NECK: soft, Supple, No JVD  CHEST/LUNG: Clear to auscultate bilaterally; No wheezing  HEART: S1S2+, Regular rate and rhythm; No murmurs  ABDOMEN: Soft, Nontender, Nondistended; Bowel sounds present  EXTREMITIES:  1+ Peripheral Pulses, No edema  SKIN: ecchymosis of the right foot   NEURO: AAOX3  PSYCH: normal mood        LABS:                          10.6   8.54  )-----------( 411      ( 13 Dec 2024 04:23 )             32.8   12-13    136  |  100  |  37.8[H]  ----------------------------<  228[H]  5.5[H]   |  23.0  |  1.59[H]    Ca    9.6      13 Dec 2024 04:23  Phos  4.4     12-12  Mg     2.6     12-13        Micro : Urine cx : ESBL E.Coli       MEDICATIONS  (STANDING):  amLODIPine   Tablet 10 milliGRAM(s) Oral daily  aspirin enteric coated 81 milliGRAM(s) Oral daily  atorvastatin 80 milliGRAM(s) Oral at bedtime  clopidogrel Tablet 75 milliGRAM(s) Oral daily  dextrose 5%. 1000 milliLiter(s) (50 mL/Hr) IV Continuous <Continuous>  dextrose 5%. 1000 milliLiter(s) (100 mL/Hr) IV Continuous <Continuous>  dextrose 50% Injectable 25 Gram(s) IV Push once  dextrose 50% Injectable 12.5 Gram(s) IV Push once  dextrose 50% Injectable 25 Gram(s) IV Push once  furosemide    Tablet 40 milliGRAM(s) Oral daily  gabapentin 300 milliGRAM(s) Oral at bedtime  glucagon  Injectable 1 milliGRAM(s) IntraMuscular once  heparin   Injectable 5000 Unit(s) SubCutaneous every 8 hours  insulin glargine Injectable (LANTUS) 26 Unit(s) SubCutaneous at bedtime  insulin lispro (ADMELOG) corrective regimen sliding scale   SubCutaneous Before meals and at bedtime  insulin lispro Injectable (ADMELOG) 5 Unit(s) SubCutaneous three times a day before meals  isosorbide   mononitrate ER Tablet (IMDUR) 60 milliGRAM(s) Oral daily  meropenem Injectable      meropenem Injectable 1000 milliGRAM(s) IV Push every 12 hours  metoprolol succinate  milliGRAM(s) Oral daily  ranolazine 500 milliGRAM(s) Oral two times a day  spironolactone 25 milliGRAM(s) Oral daily    MEDICATIONS  (PRN):  acetaminophen     Tablet .. 650 milliGRAM(s) Oral every 6 hours PRN Temp greater or equal to 38C (100.4F), Mild Pain (1 - 3)  aluminum hydroxide/magnesium hydroxide/simethicone Suspension 30 milliLiter(s) Oral every 4 hours PRN Dyspepsia  dextrose Oral Gel 15 Gram(s) Oral once PRN Blood Glucose LESS THAN 70 milliGRAM(s)/deciliter  melatonin 3 milliGRAM(s) Oral at bedtime PRN Insomnia  ondansetron Injectable 4 milliGRAM(s) IV Push every 8 hours PRN Nausea and/or Vomiting

## 2024-12-14 LAB
-  AMPICILLIN/SULBACTAM: SIGNIFICANT CHANGE UP
-  AMPICILLIN: SIGNIFICANT CHANGE UP
-  AZTREONAM: SIGNIFICANT CHANGE UP
-  CEFAZOLIN: SIGNIFICANT CHANGE UP
-  CEFEPIME: SIGNIFICANT CHANGE UP
-  CEFTRIAXONE: SIGNIFICANT CHANGE UP
-  CEFUROXIME: SIGNIFICANT CHANGE UP
-  CIPROFLOXACIN: SIGNIFICANT CHANGE UP
-  ERTAPENEM: SIGNIFICANT CHANGE UP
-  GENTAMICIN: SIGNIFICANT CHANGE UP
-  IMIPENEM: SIGNIFICANT CHANGE UP
-  LEVOFLOXACIN: SIGNIFICANT CHANGE UP
-  MEROPENEM: SIGNIFICANT CHANGE UP
-  NITROFURANTOIN: SIGNIFICANT CHANGE UP
-  PIPERACILLIN/TAZOBACTAM: SIGNIFICANT CHANGE UP
-  TOBRAMYCIN: SIGNIFICANT CHANGE UP
-  TRIMETHOPRIM/SULFAMETHOXAZOLE: SIGNIFICANT CHANGE UP
ANION GAP SERPL CALC-SCNC: 12 MMOL/L — SIGNIFICANT CHANGE UP (ref 5–17)
ANION GAP SERPL CALC-SCNC: 13 MMOL/L — SIGNIFICANT CHANGE UP (ref 5–17)
ANION GAP SERPL CALC-SCNC: 13 MMOL/L — SIGNIFICANT CHANGE UP (ref 5–17)
BUN SERPL-MCNC: 36 MG/DL — HIGH (ref 8–20)
BUN SERPL-MCNC: 41.1 MG/DL — HIGH (ref 8–20)
BUN SERPL-MCNC: 45.8 MG/DL — HIGH (ref 8–20)
CALCIUM SERPL-MCNC: 10.1 MG/DL — SIGNIFICANT CHANGE UP (ref 8.4–10.5)
CALCIUM SERPL-MCNC: 9.4 MG/DL — SIGNIFICANT CHANGE UP (ref 8.4–10.5)
CALCIUM SERPL-MCNC: 9.8 MG/DL — SIGNIFICANT CHANGE UP (ref 8.4–10.5)
CHLORIDE SERPL-SCNC: 100 MMOL/L — SIGNIFICANT CHANGE UP (ref 96–108)
CHLORIDE SERPL-SCNC: 96 MMOL/L — SIGNIFICANT CHANGE UP (ref 96–108)
CHLORIDE SERPL-SCNC: 97 MMOL/L — SIGNIFICANT CHANGE UP (ref 96–108)
CO2 SERPL-SCNC: 19 MMOL/L — LOW (ref 22–29)
CO2 SERPL-SCNC: 21 MMOL/L — LOW (ref 22–29)
CO2 SERPL-SCNC: 21 MMOL/L — LOW (ref 22–29)
CREAT SERPL-MCNC: 1.63 MG/DL — HIGH (ref 0.5–1.3)
CREAT SERPL-MCNC: 1.73 MG/DL — HIGH (ref 0.5–1.3)
CREAT SERPL-MCNC: 1.84 MG/DL — HIGH (ref 0.5–1.3)
CULTURE RESULTS: ABNORMAL
EGFR: 29 ML/MIN/1.73M2 — LOW
EGFR: 31 ML/MIN/1.73M2 — LOW
EGFR: 33 ML/MIN/1.73M2 — LOW
GLUCOSE BLDC GLUCOMTR-MCNC: 172 MG/DL — HIGH (ref 70–99)
GLUCOSE BLDC GLUCOMTR-MCNC: 190 MG/DL — HIGH (ref 70–99)
GLUCOSE BLDC GLUCOMTR-MCNC: 197 MG/DL — HIGH (ref 70–99)
GLUCOSE BLDC GLUCOMTR-MCNC: 218 MG/DL — HIGH (ref 70–99)
GLUCOSE SERPL-MCNC: 136 MG/DL — HIGH (ref 70–99)
GLUCOSE SERPL-MCNC: 155 MG/DL — HIGH (ref 70–99)
GLUCOSE SERPL-MCNC: 171 MG/DL — HIGH (ref 70–99)
HCT VFR BLD CALC: 35.8 % — SIGNIFICANT CHANGE UP (ref 34.5–45)
HGB BLD-MCNC: 11.4 G/DL — LOW (ref 11.5–15.5)
MCHC RBC-ENTMCNC: 27.2 PG — SIGNIFICANT CHANGE UP (ref 27–34)
MCHC RBC-ENTMCNC: 31.8 G/DL — LOW (ref 32–36)
MCV RBC AUTO: 85.4 FL — SIGNIFICANT CHANGE UP (ref 80–100)
METHOD TYPE: SIGNIFICANT CHANGE UP
ORGANISM # SPEC MICROSCOPIC CNT: ABNORMAL
ORGANISM # SPEC MICROSCOPIC CNT: SIGNIFICANT CHANGE UP
PLATELET # BLD AUTO: 463 K/UL — HIGH (ref 150–400)
POTASSIUM SERPL-MCNC: 5.4 MMOL/L — HIGH (ref 3.5–5.3)
POTASSIUM SERPL-MCNC: 5.6 MMOL/L — HIGH (ref 3.5–5.3)
POTASSIUM SERPL-MCNC: 6.2 MMOL/L — CRITICAL HIGH (ref 3.5–5.3)
POTASSIUM SERPL-SCNC: 5.4 MMOL/L — HIGH (ref 3.5–5.3)
POTASSIUM SERPL-SCNC: 5.6 MMOL/L — HIGH (ref 3.5–5.3)
POTASSIUM SERPL-SCNC: 6.2 MMOL/L — CRITICAL HIGH (ref 3.5–5.3)
RBC # BLD: 4.19 M/UL — SIGNIFICANT CHANGE UP (ref 3.8–5.2)
RBC # FLD: 19.8 % — HIGH (ref 10.3–14.5)
SODIUM SERPL-SCNC: 129 MMOL/L — LOW (ref 135–145)
SODIUM SERPL-SCNC: 129 MMOL/L — LOW (ref 135–145)
SODIUM SERPL-SCNC: 134 MMOL/L — LOW (ref 135–145)
SPECIMEN SOURCE: SIGNIFICANT CHANGE UP
WBC # BLD: 7.97 K/UL — SIGNIFICANT CHANGE UP (ref 3.8–10.5)
WBC # FLD AUTO: 7.97 K/UL — SIGNIFICANT CHANGE UP (ref 3.8–10.5)

## 2024-12-14 PROCEDURE — 99233 SBSQ HOSP IP/OBS HIGH 50: CPT

## 2024-12-14 RX ORDER — SPIRONOLACTONE 25 MG
12.5 TABLET ORAL DAILY
Refills: 0 | Status: DISCONTINUED | OUTPATIENT
Start: 2024-12-14 | End: 2024-12-14

## 2024-12-14 RX ORDER — SODIUM ZIRCONIUM CYCLOSILICATE 5 G/5G
5 POWDER, FOR SUSPENSION ORAL ONCE
Refills: 0 | Status: COMPLETED | OUTPATIENT
Start: 2024-12-14 | End: 2024-12-14

## 2024-12-14 RX ORDER — SODIUM ZIRCONIUM CYCLOSILICATE 5 G/5G
10 POWDER, FOR SUSPENSION ORAL DAILY
Refills: 0 | Status: DISCONTINUED | OUTPATIENT
Start: 2024-12-14 | End: 2024-12-20

## 2024-12-14 RX ORDER — ALBUTEROL 90 MCG
2.5 AEROSOL (GRAM) INHALATION ONCE
Refills: 0 | Status: COMPLETED | OUTPATIENT
Start: 2024-12-14 | End: 2024-12-14

## 2024-12-14 RX ADMIN — INSULIN GLARGINE 26 UNIT(S): 100 INJECTION, SOLUTION SUBCUTANEOUS at 21:36

## 2024-12-14 RX ADMIN — AMLODIPINE BESYLATE 10 MILLIGRAM(S): 10 TABLET ORAL at 05:36

## 2024-12-14 RX ADMIN — SODIUM ZIRCONIUM CYCLOSILICATE 10 GRAM(S): 5 POWDER, FOR SUSPENSION ORAL at 12:13

## 2024-12-14 RX ADMIN — Medication 5000 UNIT(S): at 12:12

## 2024-12-14 RX ADMIN — MEROPENEM 1000 MILLIGRAM(S): 500 INJECTION, POWDER, FOR SOLUTION INTRAVENOUS at 17:35

## 2024-12-14 RX ADMIN — SODIUM ZIRCONIUM CYCLOSILICATE 5 GRAM(S): 5 POWDER, FOR SUSPENSION ORAL at 06:27

## 2024-12-14 RX ADMIN — Medication 5000 UNIT(S): at 21:36

## 2024-12-14 RX ADMIN — Medication 5 UNIT(S): at 12:21

## 2024-12-14 RX ADMIN — FUROSEMIDE 40 MILLIGRAM(S): 40 TABLET ORAL at 05:35

## 2024-12-14 RX ADMIN — Medication 1: at 08:23

## 2024-12-14 RX ADMIN — ACETAMINOPHEN 500MG 650 MILLIGRAM(S): 500 TABLET, COATED ORAL at 10:07

## 2024-12-14 RX ADMIN — RANOLAZINE 500 MILLIGRAM(S): 1000 TABLET, FILM COATED, EXTENDED RELEASE ORAL at 05:36

## 2024-12-14 RX ADMIN — Medication 81 MILLIGRAM(S): at 12:13

## 2024-12-14 RX ADMIN — Medication 1: at 12:20

## 2024-12-14 RX ADMIN — Medication 2: at 21:37

## 2024-12-14 RX ADMIN — Medication 2.5 MILLIGRAM(S): at 07:37

## 2024-12-14 RX ADMIN — Medication 12.5 MILLIGRAM(S): at 05:36

## 2024-12-14 RX ADMIN — RANOLAZINE 500 MILLIGRAM(S): 1000 TABLET, FILM COATED, EXTENDED RELEASE ORAL at 17:31

## 2024-12-14 RX ADMIN — Medication 5 UNIT(S): at 08:23

## 2024-12-14 RX ADMIN — Medication 60 MILLIGRAM(S): at 12:13

## 2024-12-14 RX ADMIN — Medication 1: at 17:36

## 2024-12-14 RX ADMIN — ACETAMINOPHEN 500MG 650 MILLIGRAM(S): 500 TABLET, COATED ORAL at 08:27

## 2024-12-14 RX ADMIN — Medication 80 MILLIGRAM(S): at 21:36

## 2024-12-14 RX ADMIN — Medication 5000 UNIT(S): at 05:35

## 2024-12-14 RX ADMIN — GABAPENTIN 300 MILLIGRAM(S): 300 CAPSULE ORAL at 21:36

## 2024-12-14 RX ADMIN — Medication 5 UNIT(S): at 17:35

## 2024-12-14 RX ADMIN — MEROPENEM 1000 MILLIGRAM(S): 500 INJECTION, POWDER, FOR SOLUTION INTRAVENOUS at 05:35

## 2024-12-14 RX ADMIN — METOPROLOL TARTRATE 100 MILLIGRAM(S): 100 TABLET, FILM COATED ORAL at 05:36

## 2024-12-14 RX ADMIN — CLOPIDOGREL 75 MILLIGRAM(S): 75 TABLET, FILM COATED ORAL at 12:13

## 2024-12-14 NOTE — CONSULT NOTE ADULT - ASSESSMENT
74 F with PMHX CAD s/p PCI, HFrEF, Mobitz II s/p PPM (MD NereydaT), PAD with chronic right LE wound, CVA, DM2, CKD3, HTN presents to [facility] ER c/o shortness of breath/RAMIREZ and worsening LE edema admitted for Acute on Chronic HFrEF Exacerbation and Hyperkalemia. Now s;/p diuresis and cardiomems placement with improvement. Found to have ESBL E.Coli UTI     Aute Hypoxic Respiratory failure secondary to ? fluid overload  Acute on Chronic CHF wirh reduced EF 2/2 isch CM s/p PCIs with MVD / CAD  Resolved, comfortable on RA now   Furosemide 40mg PO daily, was on IV lasix  spironolactone 25 mg daily - off now d/t high K; was also on Entressto - off now    CKD3/BRADY - likel 2/2 diuretics/entresto- off now; watch    Kayexalate 30 g if K not improving with Lokelma  2g k diet

## 2024-12-14 NOTE — PROVIDER CONTACT NOTE (CRITICAL VALUE NOTIFICATION) - ACTION/TREATMENT ORDERED:
As per provider, give Lokelma now and will put in a repeat labs. Will await further orders.
Provider made aware, Cardio following pt

## 2024-12-14 NOTE — PROGRESS NOTE ADULT - ASSESSMENT
74 F with PMHX CAD s/p PCI, HFrEF, Mobitz II s/p PPM (MIRNA Dawn), PAD with chronic right LE wound, CVA, DM2, CKD3, HTN presents to [facility] ER c/o shortness of breath/RAMIREZ and worsening LE edema admitted for Acute on Chronic HFrEF Exacerbation and Hyperkalemia. Now s;/p diuresis and cardiomems placement with improvement. Found to have ESBL E.Coli UTI     #Acute Hypoxic Respiratory failure secondary to   Acute on Chronic CHF wirh reduced EF 2/2 isch CM s/p PCIs with MVD / CAD  Resolved, comfortable on RA now   Daily weight, I/O, salt and fluid restriction  Supplemental O2, wean as tolerated  Furosemide 40mg PO daily  spironolactone on hold due to hyperkalemia  Continue DAPT   Continue Ranexa   Continue Statin   ARNi on hold due to BRADY and hyperkalemia  Metoprolol  TTE reviewed  s/p RHC and Cardiomems placement    #Hyperkalemia   5.5 (non hemolyzed today)  Lokelma 10 mg daily  held Aldactone to 12.5 mg daily   Nephrologist Dr. Montgomery consulted    #BRADY on CKD stage 3b   Hyperkalemia at presentation, Resolved  Based on prior renla inces, Cr Cl more or ess at baseline  Permissive Azotemia with diuretics acceptable for cardiac optimization   Avoid Nephrotoxins  Trend lytes   Hold aldectone and ARNi due to hyperkalemia  [place] nephrology consulted    #Anemia ( Iron deficiency)  Stable   Venofer 200 mg IVPB daily (2/3)  monitor Hgb  Outpt GI colonoscopy or screening   Hb is ~10    #CAD s/p PCI,   Mobitz Type 2 s/p MicraPPM  DAPT, Statin, BB, Nitrate and antianginal    #Recurring UTIs  Per son, h/o recurrent UTIs  Now with ESBL E.Coli. No oral options  Continue Meropenem IV (Day 1 on 12/02/2024)    Diabetes Mellitus type2 on insulin with neuropathy   A1c 7.1  Improving   Hold oral hypoglycemic agents  Blood glucose monitoring with sliding scale  Glargine 26U  Lispro 5U premeal tid  Lispro s/s   Gabapentin 300mg qHS    Foot pain: xrays done shows no fracture, CT scan is negative for fracture    UTI: she has burning urination, UA suggestive of UTI, has been started on Ceftriaxone, will get urine cultures.      VTE Prophylaxis: SCDs/SQH 5000q8    DISPO: Likely LALA in 1-2 days once potassium is stable.

## 2024-12-14 NOTE — PROGRESS NOTE ADULT - SUBJECTIVE AND OBJECTIVE BOX
[facility] Division of Hospital Medicine  Hillary Padilla MD   I'm reachable on [employer] Teams      Patient is a 74y old  Female who presents with a chief complaint of Acute on Chronic HFrEF Exacerbation (13 Dec 2024 20:45)      SUBJECTIVE / OVERNIGHT EVENTS:   Patient was seen and examined during rounds  Denied any pain, nausea or vomiting. Reports improvement in dysuria  Urine culture grew ESBL    12 points ROS negative except above    MEDICATIONS  (STANDING):  amLODIPine   Tablet 10 milliGRAM(s) Oral daily  aspirin enteric coated 81 milliGRAM(s) Oral daily  atorvastatin 80 milliGRAM(s) Oral at bedtime  clopidogrel Tablet 75 milliGRAM(s) Oral daily  dextrose 5%. 1000 milliLiter(s) (50 mL/Hr) IV Continuous <Continuous>  dextrose 5%. 1000 milliLiter(s) (100 mL/Hr) IV Continuous <Continuous>  dextrose 50% Injectable 25 Gram(s) IV Push once  dextrose 50% Injectable 12.5 Gram(s) IV Push once  dextrose 50% Injectable 25 Gram(s) IV Push once  furosemide    Tablet 40 milliGRAM(s) Oral daily  gabapentin 300 milliGRAM(s) Oral at bedtime  glucagon  Injectable 1 milliGRAM(s) IntraMuscular once  heparin   Injectable 5000 Unit(s) SubCutaneous every 8 hours  insulin glargine Injectable (LANTUS) 26 Unit(s) SubCutaneous at bedtime  insulin lispro (ADMELOG) corrective regimen sliding scale   SubCutaneous Before meals and at bedtime  insulin lispro Injectable (ADMELOG) 5 Unit(s) SubCutaneous three times a day before meals  isosorbide   mononitrate ER Tablet (IMDUR) 60 milliGRAM(s) Oral daily  meropenem Injectable 1000 milliGRAM(s) IV Push every 12 hours  meropenem Injectable      metoprolol succinate  milliGRAM(s) Oral daily  ranolazine 500 milliGRAM(s) Oral two times a day  sodium zirconium cyclosilicate 10 Gram(s) Oral daily    MEDICATIONS  (PRN):  acetaminophen     Tablet .. 650 milliGRAM(s) Oral every 6 hours PRN Temp greater or equal to 38C (100.4F), Mild Pain (1 - 3)  aluminum hydroxide/magnesium hydroxide/simethicone Suspension 30 milliLiter(s) Oral every 4 hours PRN Dyspepsia  dextrose Oral Gel 15 Gram(s) Oral once PRN Blood Glucose LESS THAN 70 milliGRAM(s)/deciliter  melatonin 3 milliGRAM(s) Oral at bedtime PRN Insomnia  ondansetron Injectable 4 milliGRAM(s) IV Push every 8 hours PRN Nausea and/or Vomiting        I&O's Summary    13 Dec 2024 07:01  -  14 Dec 2024 07:00  --------------------------------------------------------  IN: 575 mL / OUT: 2850 mL / NET: -2275 mL    14 Dec 2024 07:01  -  14 Dec 2024 16:17  --------------------------------------------------------  IN: 432 mL / OUT: 1000 mL / NET: -568 mL        PHYSICAL EXAM:  Vital Signs Last 24 Hrs  T(C): 36.3 (14 Dec 2024 08:32), Max: 36.8 (13 Dec 2024 20:40)  T(F): 97.3 (14 Dec 2024 08:32), Max: 98.3 (13 Dec 2024 20:40)  HR: 78 (14 Dec 2024 08:32) (65 - 86)  BP: 146/73 (14 Dec 2024 08:32) (126/69 - 166/85)  BP(mean): 87 (14 Dec 2024 05:30) (87 - 112)  RR: 18 (14 Dec 2024 08:32) (18 - 19)  SpO2: 99% (14 Dec 2024 08:32) (96% - 100%)    Parameters below as of 14 Dec 2024 08:32  Patient On (Oxygen Delivery Method): room air        CONSTITUTIONAL: NAD, Not in acute distress  EYES:  EOMI, conjunctiva and sclera clear  ENMT: , neck supple , non-tender  RESPIRATORY: Normal respiratory effort; lungs are clear to auscultation bilaterally  CARDIOVASCULAR: S1S2 present  ABDOMEN: soft. bowel sound present  MUSCLOSKELETAL: ; no clubbing or cyanosis of digits;   PSYCH: A+O to person, place, and time; affect appropriate  NEUROLOGY: CN, motor and sensory intact   SKIN: No rashes; no palpable lesions  Extremity: No bilateral edema      LABS:                        11.4   7.97  )-----------( 463      ( 14 Dec 2024 04:45 )             35.8     12-14    129[L]  |  96  |  45.8[H]  ----------------------------<  155[H]  5.4[H]   |  21.0[L]  |  1.73[H]    Ca    9.8      14 Dec 2024 14:27  Mg     2.6     12-13            Urinalysis Basic - ( 14 Dec 2024 14:27 )    Color: x / Appearance: x / SG: x / pH: x  Gluc: 155 mg/dL / Ketone: x  / Bili: x / Urobili: x   Blood: x / Protein: x / Nitrite: x   Leuk Esterase: x / RBC: x / WBC x   Sq Epi: x / Non Sq Epi: x / Bacteria: x        CAPILLARY BLOOD GLUCOSE      POCT Blood Glucose.: 172 mg/dL (14 Dec 2024 12:10)  POCT Blood Glucose.: 190 mg/dL (14 Dec 2024 08:20)  POCT Blood Glucose.: 156 mg/dL (13 Dec 2024 20:38)  POCT Blood Glucose.: 213 mg/dL (13 Dec 2024 17:57)      Labs reviewed:    RADIOLOGY & ADDITIONAL TESTS:  Imaging Personally Reviewed:  Electrocardiogram Personally Reviewed:

## 2024-12-14 NOTE — CONSULT NOTE ADULT - SUBJECTIVE AND OBJECTIVE BOX
Patient is a 74y old  Female who presents with a chief complaint of SOB- found with Acute on Chronic HFrEF Exacerbation (13 Dec 2024 20:45)      HPI:    74F with PMHX CAD s/p PCI, HFrEF, Mobitz II s/p PPM (Nereyda, MDT), PAD with chronic right LE wound, CVA, DM2, CKD3, HTN presents to [facility] ER c/o shortness of breath/RAMIREZ and worsening LE edema. ROS +nonradiating CP and nonproductive cough. Pt recently admitted for decompensated CHF and was doing well until she noted increased weight gain and called her Cardiologist Dr Allen who advised her to come to ER for further evaluation. Pt normally takes Lasix 20mg daily but took 40mg today PTA with improvement. CXR with congestive findings and mild effusions bilaterally. Crackles and pitting edema on exam. Labs notable for leukocytosis, CKD, and hyperkalemia.   Hx from chart and  thru     ROS negative unless mentioned. (03 Dec 2024 23:51)      PAST MEDICAL & SURGICAL HISTORY:  DM (diabetes mellitus)      HTN (hypertension)      H/O gastroesophageal reflux (GERD)      Cardiac pacemaker  MICRA for mobitz type 11      CVA (cerebrovascular accident)  resdiual right sided weakness      CKD 3      PAD (peripheral artery disease)      Wound of right leg      History of benign brain tumor      Cataract      S/P angiogram of extremity      FAMILY HISTORY:  FH: asthma  Son      Social History:   -smoke   - Alcohol   -    Drugs        REVIEW OF SYSTEMS:  CONSTITUTIONAL: No fever, weight loss, or fatigue  EYES: No eye pain, No visual disturbances,   RESPIRATORY: No cough, hemoptysis; - SOB now  CARDIOVASCULAR: No chest pain, No palpitations,   + leg swelling  GASTROINTESTINAL: No abdominal , NVD or GIB  GENITOURINARY: No UTI sx/hematuria  NEUROLOGICAL: No HA/wk/numbness  MUSCULOSKELETAL: No joint pain, No swelling      Vital Signs Last 24 Hrs  T(C): 36.3 (14 Dec 2024 08:32), Max: 36.8 (13 Dec 2024 20:40)  T(F): 97.3 (14 Dec 2024 08:32), Max: 98.3 (13 Dec 2024 20:40)  HR: 78 (14 Dec 2024 08:32) (65 - 86)  BP: 146/73 (14 Dec 2024 08:32) (126/69 - 166/85)  BP(mean): 87 (14 Dec 2024 05:30) (87 - 112)  RR: 18 (14 Dec 2024 08:32) (18 - 19)  SpO2: 99% (14 Dec 2024 08:32) (96% - 100%)    Parameters below as of 14 Dec 2024 08:32  Patient On (Oxygen Delivery Method): room air        PHYSICAL EXAM:    GENERAL: NAD, sl obese  EYES:  conjunctiva and sclera clear  NECK: Supple, No JVD/Carotid Bruit -ve   NERVOUS SYSTEM:  A/O    Lungs : No rales, No rhonchi,   HEART:  No murmur,  No rub, No gallops  ABDOMEN: Soft, NT/ND BS+  EXTREMITIES:  + Peripheral Pulses, + edema  SKIN: No rashes or lesions      LABS:                        11.4   7.97  )-----------( 463      ( 14 Dec 2024 04:45 )             35.8     12-14    129[L]  |  96  |  45.8[H]  ----------------------------<  155[H]  5.4[H]   |  21.0[L]  |  1.73[H]    Ca    9.8      14 Dec 2024 14:27  Mg     2.6     12-13        Urinalysis Basic - ( 14 Dec 2024 14:27 )    Color: x / Appearance: x / SG: x / pH: x  Gluc: 155 mg/dL / Ketone: x  / Bili: x / Urobili: x   Blood: x / Protein: x / Nitrite: x   Leuk Esterase: x / RBC: x / WBC x   Sq Epi: x / Non Sq Epi: x / Bacteria: x      RADIOLOGY & ADDITIONAL TESTS:    MEDICATIONS  (STANDING):  acetaminophen     Tablet .. PRN  aluminum hydroxide/magnesium hydroxide/simethicone Suspension PRN  amLODIPine   Tablet  aspirin enteric coated  atorvastatin  clopidogrel Tablet  dextrose 5%.  dextrose 5%.  dextrose 50% Injectable  dextrose 50% Injectable  dextrose 50% Injectable  dextrose Oral Gel PRN  furosemide    Tablet  gabapentin  glucagon  Injectable  heparin   Injectable  insulin glargine Injectable (LANTUS)  insulin lispro (ADMELOG) corrective regimen sliding scale  insulin lispro Injectable (ADMELOG)  isosorbide   mononitrate ER Tablet (IMDUR)  melatonin PRN  meropenem Injectable  meropenem Injectable  metoprolol succinate ER  ondansetron Injectable PRN  ranolazine  sodium zirconium cyclosilicate

## 2024-12-15 LAB
ALBUMIN SERPL ELPH-MCNC: 3.8 G/DL — SIGNIFICANT CHANGE UP (ref 3.3–5.2)
ALP SERPL-CCNC: 93 U/L — SIGNIFICANT CHANGE UP (ref 40–120)
ALT FLD-CCNC: 11 U/L — SIGNIFICANT CHANGE UP
ANION GAP SERPL CALC-SCNC: 14 MMOL/L — SIGNIFICANT CHANGE UP (ref 5–17)
AST SERPL-CCNC: 15 U/L — SIGNIFICANT CHANGE UP
BILIRUB SERPL-MCNC: 0.3 MG/DL — LOW (ref 0.4–2)
BUN SERPL-MCNC: 48 MG/DL — HIGH (ref 8–20)
CALCIUM SERPL-MCNC: 9.7 MG/DL — SIGNIFICANT CHANGE UP (ref 8.4–10.5)
CHLORIDE SERPL-SCNC: 100 MMOL/L — SIGNIFICANT CHANGE UP (ref 96–108)
CO2 SERPL-SCNC: 19 MMOL/L — LOW (ref 22–29)
CREAT SERPL-MCNC: 1.77 MG/DL — HIGH (ref 0.5–1.3)
EGFR: 30 ML/MIN/1.73M2 — LOW
GLUCOSE BLDC GLUCOMTR-MCNC: 140 MG/DL — HIGH (ref 70–99)
GLUCOSE BLDC GLUCOMTR-MCNC: 169 MG/DL — HIGH (ref 70–99)
GLUCOSE BLDC GLUCOMTR-MCNC: 179 MG/DL — HIGH (ref 70–99)
GLUCOSE BLDC GLUCOMTR-MCNC: 232 MG/DL — HIGH (ref 70–99)
GLUCOSE SERPL-MCNC: 175 MG/DL — HIGH (ref 70–99)
HCT VFR BLD CALC: 36.7 % — SIGNIFICANT CHANGE UP (ref 34.5–45)
HGB BLD-MCNC: 11.4 G/DL — LOW (ref 11.5–15.5)
MAGNESIUM SERPL-MCNC: 2.8 MG/DL — HIGH (ref 1.6–2.6)
MCHC RBC-ENTMCNC: 26.7 PG — LOW (ref 27–34)
MCHC RBC-ENTMCNC: 31.1 G/DL — LOW (ref 32–36)
MCV RBC AUTO: 85.9 FL — SIGNIFICANT CHANGE UP (ref 80–100)
PHOSPHATE SERPL-MCNC: 5.4 MG/DL — HIGH (ref 2.4–4.7)
PLATELET # BLD AUTO: 437 K/UL — HIGH (ref 150–400)
POTASSIUM SERPL-MCNC: 5.2 MMOL/L — SIGNIFICANT CHANGE UP (ref 3.5–5.3)
POTASSIUM SERPL-SCNC: 5.2 MMOL/L — SIGNIFICANT CHANGE UP (ref 3.5–5.3)
PROT SERPL-MCNC: 7.7 G/DL — SIGNIFICANT CHANGE UP (ref 6.6–8.7)
RBC # BLD: 4.27 M/UL — SIGNIFICANT CHANGE UP (ref 3.8–5.2)
RBC # FLD: 19.7 % — HIGH (ref 10.3–14.5)
SODIUM SERPL-SCNC: 133 MMOL/L — LOW (ref 135–145)
WBC # BLD: 8.49 K/UL — SIGNIFICANT CHANGE UP (ref 3.8–10.5)
WBC # FLD AUTO: 8.49 K/UL — SIGNIFICANT CHANGE UP (ref 3.8–10.5)

## 2024-12-15 PROCEDURE — 73630 X-RAY EXAM OF FOOT: CPT | Mod: 26,RT

## 2024-12-15 PROCEDURE — 99233 SBSQ HOSP IP/OBS HIGH 50: CPT

## 2024-12-15 PROCEDURE — 76882 US LMTD JT/FCL EVL NVASC XTR: CPT | Mod: 26,RT

## 2024-12-15 RX ORDER — ACETAMINOPHEN 500MG 500 MG/1
650 TABLET, COATED ORAL EVERY 6 HOURS
Refills: 0 | Status: COMPLETED | OUTPATIENT
Start: 2024-12-15 | End: 2024-12-19

## 2024-12-15 RX ORDER — ERTAPENEM 1 G/1
500 INJECTION, POWDER, LYOPHILIZED, FOR SOLUTION INTRAMUSCULAR; INTRAVENOUS EVERY 24 HOURS
Refills: 0 | Status: COMPLETED | OUTPATIENT
Start: 2024-12-15 | End: 2024-12-16

## 2024-12-15 RX ORDER — OXYCODONE HYDROCHLORIDE 30 MG/1
5 TABLET ORAL EVERY 6 HOURS
Refills: 0 | Status: DISCONTINUED | OUTPATIENT
Start: 2024-12-15 | End: 2024-12-18

## 2024-12-15 RX ADMIN — RANOLAZINE 500 MILLIGRAM(S): 1000 TABLET, FILM COATED, EXTENDED RELEASE ORAL at 05:16

## 2024-12-15 RX ADMIN — Medication 2: at 08:36

## 2024-12-15 RX ADMIN — ACETAMINOPHEN 500MG 650 MILLIGRAM(S): 500 TABLET, COATED ORAL at 23:26

## 2024-12-15 RX ADMIN — CLOPIDOGREL 75 MILLIGRAM(S): 75 TABLET, FILM COATED ORAL at 11:15

## 2024-12-15 RX ADMIN — Medication 60 MILLIGRAM(S): at 11:15

## 2024-12-15 RX ADMIN — FUROSEMIDE 40 MILLIGRAM(S): 40 TABLET ORAL at 05:16

## 2024-12-15 RX ADMIN — ACETAMINOPHEN 500MG 650 MILLIGRAM(S): 500 TABLET, COATED ORAL at 12:44

## 2024-12-15 RX ADMIN — Medication 5 UNIT(S): at 08:35

## 2024-12-15 RX ADMIN — Medication 80 MILLIGRAM(S): at 21:37

## 2024-12-15 RX ADMIN — METOPROLOL TARTRATE 100 MILLIGRAM(S): 100 TABLET, FILM COATED ORAL at 05:16

## 2024-12-15 RX ADMIN — Medication 5 UNIT(S): at 17:12

## 2024-12-15 RX ADMIN — Medication 1: at 11:42

## 2024-12-15 RX ADMIN — Medication 5 UNIT(S): at 11:41

## 2024-12-15 RX ADMIN — ACETAMINOPHEN 500MG 650 MILLIGRAM(S): 500 TABLET, COATED ORAL at 17:08

## 2024-12-15 RX ADMIN — AMLODIPINE BESYLATE 10 MILLIGRAM(S): 10 TABLET ORAL at 05:16

## 2024-12-15 RX ADMIN — ACETAMINOPHEN 500MG 650 MILLIGRAM(S): 500 TABLET, COATED ORAL at 11:15

## 2024-12-15 RX ADMIN — MEROPENEM 1000 MILLIGRAM(S): 500 INJECTION, POWDER, FOR SOLUTION INTRAVENOUS at 05:16

## 2024-12-15 RX ADMIN — OXYCODONE HYDROCHLORIDE 5 MILLIGRAM(S): 30 TABLET ORAL at 20:08

## 2024-12-15 RX ADMIN — Medication 1: at 21:40

## 2024-12-15 RX ADMIN — Medication 81 MILLIGRAM(S): at 11:15

## 2024-12-15 RX ADMIN — ERTAPENEM 100 MILLIGRAM(S): 1 INJECTION, POWDER, LYOPHILIZED, FOR SOLUTION INTRAMUSCULAR; INTRAVENOUS at 17:08

## 2024-12-15 RX ADMIN — SODIUM ZIRCONIUM CYCLOSILICATE 10 GRAM(S): 5 POWDER, FOR SUSPENSION ORAL at 11:15

## 2024-12-15 RX ADMIN — Medication 5000 UNIT(S): at 05:16

## 2024-12-15 RX ADMIN — GABAPENTIN 300 MILLIGRAM(S): 300 CAPSULE ORAL at 21:37

## 2024-12-15 RX ADMIN — INSULIN GLARGINE 26 UNIT(S): 100 INJECTION, SOLUTION SUBCUTANEOUS at 21:40

## 2024-12-15 RX ADMIN — RANOLAZINE 500 MILLIGRAM(S): 1000 TABLET, FILM COATED, EXTENDED RELEASE ORAL at 17:08

## 2024-12-15 RX ADMIN — OXYCODONE HYDROCHLORIDE 5 MILLIGRAM(S): 30 TABLET ORAL at 21:08

## 2024-12-15 RX ADMIN — ACETAMINOPHEN 500MG 650 MILLIGRAM(S): 500 TABLET, COATED ORAL at 17:46

## 2024-12-15 NOTE — CONSULT NOTE ADULT - CONSULT REASON
CKD, hyperkalemia
Pt with Acute on Chronic HF s/p multiple hospitalizations over October, November and now December.  Known CAD for R&LHC with possible PCI and Cardiomems placement.
Dark color stool
Right foot swelling and bruising
shortness of breath

## 2024-12-15 NOTE — CONSULT NOTE ADULT - REASON FOR ADMISSION
Acute on Chronic HFrEF Exacerbation

## 2024-12-15 NOTE — PROGRESS NOTE ADULT - SUBJECTIVE AND OBJECTIVE BOX
Patient seen and examined    I&O's Summary    14 Dec 2024 07:01  -  15 Dec 2024 07:00  --------------------------------------------------------  IN: 672 mL / OUT: 2150 mL / NET: -1478 mL        REVIEW OF SYSTEMS:    CONSTITUTIONAL: No F/C  RESPIRATORY: No cough,  No SOB  CARDIOVASCULAR: No CP/palpitations,    GASTROINTESTINAL: No abdominal pain  or NVD   GENITOURINARY: No UTI sx  NEUROLOGICAL: No headaches, NO wk/numbness  MUSCULOSKELETAL: c/o pain L foot at trauma site  EXTREMITIES : no swelling,    Vital Signs Last 24 Hrs  T(C): 36.9 (15 Dec 2024 11:14), Max: 36.9 (15 Dec 2024 11:14)  T(F): 98.5 (15 Dec 2024 11:14), Max: 98.5 (15 Dec 2024 11:14)  HR: 60 (15 Dec 2024 11:14) (60 - 79)  BP: 119/76 (15 Dec 2024 11:14) (117/71 - 145/74)  BP(mean): 91 (14 Dec 2024 20:43) (91 - 91)  RR: 18 (15 Dec 2024 11:14) (18 - 18)  SpO2: 100% (15 Dec 2024 11:14) (96% - 100%)    Parameters below as of 15 Dec 2024 11:14  Patient On (Oxygen Delivery Method): room air        PHYSICAL EXAM:    GENERAL: NAD,   EYES:  conjunctiva and sclera clear  NECK: Supple, No JVD/Bruit  NERVOUS SYSTEM:  A/O x3,   CHEST:  No rales or rhonchi  HEART:  RRR, No murmur  ABDOMEN: Soft, NT/ND BS+  EXTREMITIES:  No Edema; L foot dorsum has 1 cm lesion/tender - post trauma  SKIN: No rashes    LABS:                          11.4   8.49  )-----------( 437      ( 15 Dec 2024 05:35 )             36.7     12-15    133[L]  |  100  |  48.0[H]  ----------------------------<  175[H]  5.2   |  19.0[L]  |  1.77[H]    Ca    9.7      15 Dec 2024 05:35  Phos  5.4     12-15  Mg     2.8     12-15    TPro  7.7  /  Alb  3.8  /  TBili  0.3[L]  /  DBili  x   /  AST  15  /  ALT  11  /  AlkPhos  93  12-15      MEDICATIONS  (STANDING):  acetaminophen     Tablet .. PRN  acetaminophen     Tablet ..  aluminum hydroxide/magnesium hydroxide/simethicone Suspension PRN  amLODIPine   Tablet  aspirin enteric coated  atorvastatin  clopidogrel Tablet  dextrose 5%.  dextrose 5%.  dextrose 50% Injectable  dextrose 50% Injectable  dextrose 50% Injectable  dextrose Oral Gel PRN  ertapenem  IVPB  furosemide    Tablet  gabapentin  glucagon  Injectable  insulin glargine Injectable (LANTUS)  insulin lispro (ADMELOG) corrective regimen sliding scale  insulin lispro Injectable (ADMELOG)  isosorbide   mononitrate ER Tablet (IMDUR)  melatonin PRN  metoprolol succinate ER  ondansetron Injectable PRN  oxyCODONE    IR PRN  ranolazine  sodium zirconium cyclosilicate

## 2024-12-15 NOTE — PROGRESS NOTE ADULT - SUBJECTIVE AND OBJECTIVE BOX
[facility] Division of Hospital Medicine  Hillary Padilla MD   I'm reachable on [employer] Teams      Patient is a 74y old  Female who presents with a chief complaint of Acute on Chronic HFrEF Exacerbation (15 Dec 2024 13:06)      SUBJECTIVE / OVERNIGHT EVENTS:   Patient was seen and examined during rounds  REports worsening right foot pain and swellling. Podiatry consulted. Repeat xray suggestive of avulsion fracture or hematoma. US pending. ICe pack applied and pain medication adjusted  Dysuria improved  POtassium stable. Continue Lokelma      12 points ROS negative except above    MEDICATIONS  (STANDING):  acetaminophen     Tablet .. 650 milliGRAM(s) Oral every 6 hours  amLODIPine   Tablet 10 milliGRAM(s) Oral daily  aspirin enteric coated 81 milliGRAM(s) Oral daily  atorvastatin 80 milliGRAM(s) Oral at bedtime  clopidogrel Tablet 75 milliGRAM(s) Oral daily  dextrose 5%. 1000 milliLiter(s) (50 mL/Hr) IV Continuous <Continuous>  dextrose 5%. 1000 milliLiter(s) (100 mL/Hr) IV Continuous <Continuous>  dextrose 50% Injectable 25 Gram(s) IV Push once  dextrose 50% Injectable 12.5 Gram(s) IV Push once  dextrose 50% Injectable 25 Gram(s) IV Push once  ertapenem  IVPB 500 milliGRAM(s) IV Intermittent every 24 hours  furosemide    Tablet 40 milliGRAM(s) Oral daily  gabapentin 300 milliGRAM(s) Oral at bedtime  glucagon  Injectable 1 milliGRAM(s) IntraMuscular once  insulin glargine Injectable (LANTUS) 26 Unit(s) SubCutaneous at bedtime  insulin lispro (ADMELOG) corrective regimen sliding scale   SubCutaneous Before meals and at bedtime  insulin lispro Injectable (ADMELOG) 5 Unit(s) SubCutaneous three times a day before meals  isosorbide   mononitrate ER Tablet (IMDUR) 60 milliGRAM(s) Oral daily  metoprolol succinate  milliGRAM(s) Oral daily  ranolazine 500 milliGRAM(s) Oral two times a day  sodium zirconium cyclosilicate 10 Gram(s) Oral daily    MEDICATIONS  (PRN):  acetaminophen     Tablet .. 650 milliGRAM(s) Oral every 6 hours PRN Temp greater or equal to 38C (100.4F), Mild Pain (1 - 3)  aluminum hydroxide/magnesium hydroxide/simethicone Suspension 30 milliLiter(s) Oral every 4 hours PRN Dyspepsia  dextrose Oral Gel 15 Gram(s) Oral once PRN Blood Glucose LESS THAN 70 milliGRAM(s)/deciliter  melatonin 3 milliGRAM(s) Oral at bedtime PRN Insomnia  ondansetron Injectable 4 milliGRAM(s) IV Push every 8 hours PRN Nausea and/or Vomiting  oxyCODONE    IR 5 milliGRAM(s) Oral every 6 hours PRN Severe Pain (7 - 10)        I&O's Summary    14 Dec 2024 07:01  -  15 Dec 2024 07:00  --------------------------------------------------------  IN: 672 mL / OUT: 2150 mL / NET: -1478 mL        PHYSICAL EXAM:  Vital Signs Last 24 Hrs  T(C): 36.9 (15 Dec 2024 11:14), Max: 36.9 (14 Dec 2024 17:13)  T(F): 98.5 (15 Dec 2024 11:14), Max: 98.5 (14 Dec 2024 17:13)  HR: 60 (15 Dec 2024 11:14) (60 - 79)  BP: 119/76 (15 Dec 2024 11:14) (117/71 - 145/74)  BP(mean): 91 (14 Dec 2024 20:43) (91 - 91)  RR: 18 (15 Dec 2024 11:14) (18 - 18)  SpO2: 100% (15 Dec 2024 11:14) (96% - 100%)    Parameters below as of 15 Dec 2024 11:14  Patient On (Oxygen Delivery Method): room air        CONSTITUTIONAL: NAD, Not in acute distress  EYES:  EOMI, conjunctiva and sclera clear  ENMT: , neck supple , non-tender  RESPIRATORY: Normal respiratory effort; lungs are clear to auscultation bilaterally  CARDIOVASCULAR: S1S2 present  ABDOMEN: soft. bowel sound present  MUSCLOSKELETAL: ; no clubbing or cyanosis of digits;   PSYCH: A+O to person, place, and time; affect appropriate  NEUROLOGY: CN, motor and sensory intact   SKIN: No rashes; no palpable lesions  Extremity: bruising and swelling over right foot      LABS:                        11.4   8.49  )-----------( 437      ( 15 Dec 2024 05:35 )             36.7     12-15    133[L]  |  100  |  48.0[H]  ----------------------------<  175[H]  5.2   |  19.0[L]  |  1.77[H]    Ca    9.7      15 Dec 2024 05:35  Phos  5.4     12-15  Mg     2.8     12-15    TPro  7.7  /  Alb  3.8  /  TBili  0.3[L]  /  DBili  x   /  AST  15  /  ALT  11  /  AlkPhos  93  12-15          Urinalysis Basic - ( 15 Dec 2024 05:35 )    Color: x / Appearance: x / SG: x / pH: x  Gluc: 175 mg/dL / Ketone: x  / Bili: x / Urobili: x   Blood: x / Protein: x / Nitrite: x   Leuk Esterase: x / RBC: x / WBC x   Sq Epi: x / Non Sq Epi: x / Bacteria: x        CAPILLARY BLOOD GLUCOSE      POCT Blood Glucose.: 179 mg/dL (15 Dec 2024 11:37)  POCT Blood Glucose.: 232 mg/dL (15 Dec 2024 08:33)  POCT Blood Glucose.: 218 mg/dL (14 Dec 2024 20:40)  POCT Blood Glucose.: 197 mg/dL (14 Dec 2024 17:29)      Labs reviewed:    RADIOLOGY & ADDITIONAL TESTS:  Imaging Personally Reviewed:  Electrocardiogram Personally Reviewed:

## 2024-12-15 NOTE — CONSULT NOTE ADULT - ASSESSMENT
A:  Right foot pain     P:   Patient evaluated and Chart reviewed   Discussed diagnosis and treatment with patient  Xray and CT reviewed from ED admission, no acute fractures noted   xray from 12/15- pending final read, questionable avulsion fx on the base of the 3rd proximal phalanx   Applied ace compression   Podiatry to monitor edema and ecchymosis tomorrow, will consider repeat imaging if ecchymosis continues to worsen.    Please continue to ice the right foot with elevation   WBAT to the heel right foot   Patient likely suffered deep bone bruise/ possible avulsion fx   Discussed with Attending Dr. Ramirez

## 2024-12-15 NOTE — PROGRESS NOTE ADULT - ASSESSMENT
74 F with PMHX CAD s/p PCI, HFrEF, Mobitz II s/p PPM (MD NereydaT), PAD with chronic right LE wound, CVA, DM2, CKD3, HTN presents to [facility] ER c/o shortness of breath/RAMIREZ and worsening LE edema admitted for Acute on Chronic HFrEF Exacerbation and Hyperkalemia. Now s;/p diuresis and cardiomems placement with improvement. Found to have ESBL E.Coli UTI     Aute Hypoxic Respiratory failure secondary to ? fluid overload  Acute on Chronic CHF wirh reduced EF 2/2 isch CM s/p PCIs with MVD / CAD  Resolved, comfortable on RA now   Furosemide 40mg PO daily, was on IV lasix  spironolactone 25 mg daily - off now d/t high K; was also on Entressto - off now    CKD3/BRADY - likel 2/2 diuretics/entresto- off now; watch creat  1.7-1.84 ( was 1.74 in Oct)    Kayexalate 30 g if K not improving with Lokelma  better 5.2 now ; Cont 2g k diet

## 2024-12-15 NOTE — CONSULT NOTE ADULT - SUBJECTIVE AND OBJECTIVE BOX
Patient is a 74y old  Female who presents with a chief complaint of Acute on Chronic HFrEF Exacerbation (14 Dec 2024 16:08)      HPI:  Pt seen/examined prior to midnight on 12/3/24.    74F with PMHX CAD s/p PCI, HFrEF, Mobitz II s/p PPM (MIRNA Dawn), PAD with chronic right LE wound, CVA, DM2, CKD3, HTN presents to [facility] ER c/o shortness of breath/RAMIREZ and worsening LE edema. ROS +nonradiating CP and nonproductive cough. Pt recently admitted for decompensated CHF and was doing well until she noted increased weight gain and called her Cardiologist Dr Allen who advised her to come to ER for further evaluation. Pt normally takes Lasix 20mg daily but took 40mg today PTA with improvement. CXR with congestive findings and mild effusions bilaterally. Crackles and pitting edema on exam. Labs notable for leukocytosis, CKD, and hyperkalemia.     ROS negative unless mentioned. (03 Dec 2024 23:51)      Podiatry HPI: 74F with PMHX CAD s/p PCI, HFrEF, Mobitz II s/p PPM (MIRNA Dawn), PAD with chronic right LE wound, CVA, DM2, CKD3, HTN presents to [facility] ER c/o shortness of breath/RAMIREZ and worsening LE edema. ROS +nonradiating CP and nonproductive cough. Patient states while in the ED an oxygen tank fell on her foot and she has had bruising and swelling since the incident happened. Patient states as of the last 2 days it has become more painful to walk and pain to touch. No open wounds noted     PMH:DM (diabetes mellitus)    HTN (hypertension)    Acute otitis media, unspecified otitis media type    H/O gastroesophageal reflux (GERD)    Cardiac pacemaker    CVA (cerebrovascular accident)    CVA (cerebrovascular accident)    PAD (peripheral artery disease)    Wound of right leg      Allergies: No Known Allergies    Medications: acetaminophen     Tablet .. 650 milliGRAM(s) Oral every 6 hours PRN  acetaminophen     Tablet .. 650 milliGRAM(s) Oral every 6 hours  aluminum hydroxide/magnesium hydroxide/simethicone Suspension 30 milliLiter(s) Oral every 4 hours PRN  amLODIPine   Tablet 10 milliGRAM(s) Oral daily  aspirin enteric coated 81 milliGRAM(s) Oral daily  atorvastatin 80 milliGRAM(s) Oral at bedtime  clopidogrel Tablet 75 milliGRAM(s) Oral daily  dextrose 5%. 1000 milliLiter(s) IV Continuous <Continuous>  dextrose 5%. 1000 milliLiter(s) IV Continuous <Continuous>  dextrose 50% Injectable 25 Gram(s) IV Push once  dextrose 50% Injectable 12.5 Gram(s) IV Push once  dextrose 50% Injectable 25 Gram(s) IV Push once  dextrose Oral Gel 15 Gram(s) Oral once PRN  ertapenem  IVPB 500 milliGRAM(s) IV Intermittent every 24 hours  furosemide    Tablet 40 milliGRAM(s) Oral daily  gabapentin 300 milliGRAM(s) Oral at bedtime  glucagon  Injectable 1 milliGRAM(s) IntraMuscular once  insulin glargine Injectable (LANTUS) 26 Unit(s) SubCutaneous at bedtime  insulin lispro (ADMELOG) corrective regimen sliding scale   SubCutaneous Before meals and at bedtime  insulin lispro Injectable (ADMELOG) 5 Unit(s) SubCutaneous three times a day before meals  isosorbide   mononitrate ER Tablet (IMDUR) 60 milliGRAM(s) Oral daily  melatonin 3 milliGRAM(s) Oral at bedtime PRN  metoprolol succinate  milliGRAM(s) Oral daily  ondansetron Injectable 4 milliGRAM(s) IV Push every 8 hours PRN  oxyCODONE    IR 5 milliGRAM(s) Oral every 6 hours PRN  ranolazine 500 milliGRAM(s) Oral two times a day  sodium zirconium cyclosilicate 10 Gram(s) Oral daily    FH:Family history of diabetes mellitus    No pertinent family history in first degree relatives    FH: asthma      PSX: History of benign brain tumor    Cataract    History of biopsy    Cardiac pacemaker    S/P angiogram of extremity      SH: acetaminophen     Tablet .. 650 milliGRAM(s) Oral every 6 hours PRN  acetaminophen     Tablet .. 650 milliGRAM(s) Oral every 6 hours  aluminum hydroxide/magnesium hydroxide/simethicone Suspension 30 milliLiter(s) Oral every 4 hours PRN  amLODIPine   Tablet 10 milliGRAM(s) Oral daily  aspirin enteric coated 81 milliGRAM(s) Oral daily  atorvastatin 80 milliGRAM(s) Oral at bedtime  clopidogrel Tablet 75 milliGRAM(s) Oral daily  dextrose 5%. 1000 milliLiter(s) IV Continuous <Continuous>  dextrose 5%. 1000 milliLiter(s) IV Continuous <Continuous>  dextrose 50% Injectable 25 Gram(s) IV Push once  dextrose 50% Injectable 12.5 Gram(s) IV Push once  dextrose 50% Injectable 25 Gram(s) IV Push once  dextrose Oral Gel 15 Gram(s) Oral once PRN  ertapenem  IVPB 500 milliGRAM(s) IV Intermittent every 24 hours  furosemide    Tablet 40 milliGRAM(s) Oral daily  gabapentin 300 milliGRAM(s) Oral at bedtime  glucagon  Injectable 1 milliGRAM(s) IntraMuscular once  insulin glargine Injectable (LANTUS) 26 Unit(s) SubCutaneous at bedtime  insulin lispro (ADMELOG) corrective regimen sliding scale   SubCutaneous Before meals and at bedtime  insulin lispro Injectable (ADMELOG) 5 Unit(s) SubCutaneous three times a day before meals  isosorbide   mononitrate ER Tablet (IMDUR) 60 milliGRAM(s) Oral daily  melatonin 3 milliGRAM(s) Oral at bedtime PRN  metoprolol succinate  milliGRAM(s) Oral daily  ondansetron Injectable 4 milliGRAM(s) IV Push every 8 hours PRN  oxyCODONE    IR 5 milliGRAM(s) Oral every 6 hours PRN  ranolazine 500 milliGRAM(s) Oral two times a day  sodium zirconium cyclosilicate 10 Gram(s) Oral daily      Vital Signs Last 24 Hrs  T(C): 36.9 (15 Dec 2024 11:14), Max: 36.9 (14 Dec 2024 17:13)  T(F): 98.5 (15 Dec 2024 11:14), Max: 98.5 (14 Dec 2024 17:13)  HR: 60 (15 Dec 2024 11:14) (60 - 79)  BP: 119/76 (15 Dec 2024 11:14) (117/71 - 145/74)  BP(mean): 91 (14 Dec 2024 20:43) (91 - 91)  RR: 18 (15 Dec 2024 11:14) (18 - 18)  SpO2: 100% (15 Dec 2024 11:14) (96% - 100%)    Parameters below as of 15 Dec 2024 11:14  Patient On (Oxygen Delivery Method): room air        LABS                        11.4   8.49  )-----------( 437      ( 15 Dec 2024 05:35 )             36.7               12-15    133[L]  |  100  |  48.0[H]  ----------------------------<  175[H]  5.2   |  19.0[L]  |  1.77[H]    Ca    9.7      15 Dec 2024 05:35  Phos  5.4     12-15  Mg     2.8     12-15    TPro  7.7  /  Alb  3.8  /  TBili  0.3[L]  /  DBili  x   /  AST  15  /  ALT  11  /  AlkPhos  93  12-15      ROS  REVIEW OF SYSTEMS:    CONSTITUTIONAL: No fever, weight loss, or fatigue  EYES: No eye pain, visual disturbances, or discharge  ENMT:  No difficulty hearing, tinnitus, vertigo; No sinus or throat pain  NECK: No pain or stiffness  BREASTS: No pain, masses, or nipple discharge  RESPIRATORY: No cough, wheezing, chills or hemoptysis; No shortness of breath  CARDIOVASCULAR: No chest pain, palpitations, dizziness, or leg swelling  GASTROINTESTINAL: No abdominal or epigastric pain. No nausea, vomiting, or hematemesis; No diarrhea or constipation. No melena or hematochezia.  GENITOURINARY: No dysuria, frequency, hematuria, or incontinence  NEUROLOGICAL: No headaches, memory loss, loss of strength, numbness, or tremors  SKIN: No itching, burning, rashes, or lesions   LYMPH NODES: No enlarged glands  ENDOCRINE: No heat or cold intolerance; No hair loss  MUSCULOSKELETAL: No joint pain or swelling; No muscle, back, or extremity pain  PSYCHIATRIC: No depression, anxiety, mood swings, or difficulty sleeping  HEME/LYMPH: No easy bruising, or bleeding gums  ALLERY AND IMMUNOLOGIC: No hives or eczema      PHYSICAL EXAM  LE Focused:    Vasc:  DP & PT faintly palpable b/l. CFT <3 seconds to all 10 digits. TG warm to warm b/l  Derm: R dorsal foot bruising and edema noted to the 3,4,5th toes. Ecchymosis noted, no appreciable hematoma is noted. No open wounds or lesions noted.   Neuro: Gross and light touch sensation intact b/l  MSK: PTP to the right foot. Pain on wiggling toes R foot.       IMAGING: ?xray    < from: CT Foot No Cont, Right (12.10.24 @ 14:14) >  INTERPRETATION:  CT OF THE RIGHT FOOT    CLINICAL INFORMATION: Right foot pain redness and swelling.  TECHNIQUE: Multidetector CT of the right foot. The study was performed   without the use of intravenous or intra-articular contrast. Multiplanar   reformats were generated for review.    COMPARISON: Right foot radiographs 12/4/2024 and 3/2/2024.    FINDINGS:    BONE: No acute fracture. No cortical destruction or periosteal new bone   formation.    JOINTS: No dislocation. Cartilage spaces are maintained.    SOFT TISSUE: Soft tissue swelling over the dorsum of the foot at the   level of the metatarsals. No focal drainable fluid collection.Mild   atrophy of the intrinsic muscles of the foot. Normal CT appearance of the   imaged tendons. Vascular calcifications. No tracking subcutaneous   emphysema.      IMPRESSION:  1.  Edema in the subcutaneous fat along the dorsum of the foot. No   drainable fluid collection is identified.  2.  No CT evidence of advanced osteomyelitis.    --- End of Report ---    < end of copied text >  < from: Xray Foot AP + Lateral + Oblique, Right (12.04.24 @ 16:52) >      INTERPRETATION:  INDICATION: Trauma to the right foot.    TECHNIQUE: 3 views of the right foot were obtained    FINDINGS: No fracture or dislocation is seen. There is mild hallux   valgus. There is a small plantar calcaneal spur. There are vascular   calcifications. There are overlying bandages.    IMPRESSION: No fracture or dislocation visualized in the right foot.    --- End of Report ---    < end of copied text >      CULTURES: Culture Results:   >100,000 CFU/ml Escherichia coli ESBL (12-11 @ 10:52)

## 2024-12-15 NOTE — PROGRESS NOTE ADULT - ASSESSMENT
74 F with PMHX CAD s/p PCI, HFrEF, Mobitz II s/p PPM (MIRNA Dawn), PAD with chronic right LE wound, CVA, DM2, CKD3, HTN presents to [facility] ER c/o shortness of breath/RAMIREZ and worsening LE edema admitted for Acute on Chronic HFrEF Exacerbation and Hyperkalemia. Now s;/p diuresis and cardiomems placement with improvement. Found to have ESBL E.Coli UTI .    #Acute Hypoxic Respiratory failure secondary to   #Acute on Chronic CHF wirh reduced EF 2/2 isch CM s/p PCIs with MVD / CAD  -Resolved, comfortable on RA now   -Daily weight, I/O, salt and fluid restriction  -Furosemide 40mg PO daily  -spironolactone and Losartan on hold due to hyperkalemia  -Continue DAPT   -Continue Ranexa   -Continue Statin   -Metoprolol  -TTE reviewed  -s/p RHC and Cardiomems placement    #Hyperkalemia   5.5 (non hemolyzed today)  Lokelma 10 mg daily  held Aldactone to 12.5 mg daily   Nephrologist Dr. Montgomery consulted    #BRADY on CKD stage 3b   Hyperkalemia at presentation, Resolved  Based on prior renla inces, Cr Cl more or ess at baseline  Permissive Azotemia with diuretics acceptable for cardiac optimization   Avoid Nephrotoxins  Hold aldectone and ARNi due to hyperkalemia  [place] nephrology consulted    #Anemia ( Iron deficiency)  Stable   completed IV Venofer 200 mg  monitor Hgb  Outpt GI colonoscopy or screening   Hb is ~10    #CAD s/p PCI,   Mobitz Type 2 s/p MicraPPM  DAPT, Statin, BB, Nitrate and antianginal    #Recurring UTIs  Per son, h/o recurrent UTIs  Now with ESBL E.Coli. No oral options  Received Meropenem for 3 days--> 2 days of ertapenem     #Diabetes Mellitus type2 on insulin with neuropathy   A1c 7.1  Improving   Hold oral hypoglycemic agents  Blood glucose monitoring with sliding scale  Glargine 26U  Lispro 5U premeal tid  Lispro s/s   Gabapentin 300mg qHS    #Foot pain:   -xrays done shows no fracture,  - CT scan is negative for fracture  -But, right foot pain and swelling is worsening  -Follow up on repeat Xray  -Follow up on right foot US  -Podiatry consulted  -Pain control with Ice pack , Oxycodone, ace wrap    UTI: she has burning urination, UA suggestive of UTI, has been started on Ceftriaxone, will get urine cultures.      VTE Prophylaxis: SCDs/SQH 5000q8    DISPO: Likely home with home PT in 1-2 days once potassium is stable and podiatry follow up

## 2024-12-16 ENCOUNTER — TRANSCRIPTION ENCOUNTER (OUTPATIENT)
Age: 74
End: 2024-12-16

## 2024-12-16 ENCOUNTER — APPOINTMENT (OUTPATIENT)
Dept: CARDIOLOGY | Facility: CLINIC | Age: 74
End: 2024-12-16

## 2024-12-16 LAB
ALBUMIN SERPL ELPH-MCNC: 3.6 G/DL — SIGNIFICANT CHANGE UP (ref 3.3–5.2)
ALP SERPL-CCNC: 95 U/L — SIGNIFICANT CHANGE UP (ref 40–120)
ALT FLD-CCNC: 10 U/L — SIGNIFICANT CHANGE UP
ANION GAP SERPL CALC-SCNC: 13 MMOL/L — SIGNIFICANT CHANGE UP (ref 5–17)
AST SERPL-CCNC: 10 U/L — SIGNIFICANT CHANGE UP
BILIRUB SERPL-MCNC: 0.3 MG/DL — LOW (ref 0.4–2)
BUN SERPL-MCNC: 55.5 MG/DL — HIGH (ref 8–20)
CALCIUM SERPL-MCNC: 9.5 MG/DL — SIGNIFICANT CHANGE UP (ref 8.4–10.5)
CHLORIDE SERPL-SCNC: 97 MMOL/L — SIGNIFICANT CHANGE UP (ref 96–108)
CO2 SERPL-SCNC: 22 MMOL/L — SIGNIFICANT CHANGE UP (ref 22–29)
CREAT SERPL-MCNC: 1.89 MG/DL — HIGH (ref 0.5–1.3)
EGFR: 28 ML/MIN/1.73M2 — LOW
GLUCOSE BLDC GLUCOMTR-MCNC: 136 MG/DL — HIGH (ref 70–99)
GLUCOSE BLDC GLUCOMTR-MCNC: 146 MG/DL — HIGH (ref 70–99)
GLUCOSE BLDC GLUCOMTR-MCNC: 148 MG/DL — HIGH (ref 70–99)
GLUCOSE BLDC GLUCOMTR-MCNC: 207 MG/DL — HIGH (ref 70–99)
GLUCOSE SERPL-MCNC: 210 MG/DL — HIGH (ref 70–99)
MAGNESIUM SERPL-MCNC: 2.9 MG/DL — HIGH (ref 1.8–2.6)
PHOSPHATE SERPL-MCNC: 6.1 MG/DL — HIGH (ref 2.4–4.7)
POTASSIUM SERPL-MCNC: 5.2 MMOL/L — SIGNIFICANT CHANGE UP (ref 3.5–5.3)
POTASSIUM SERPL-SCNC: 5.2 MMOL/L — SIGNIFICANT CHANGE UP (ref 3.5–5.3)
PROT SERPL-MCNC: 7.3 G/DL — SIGNIFICANT CHANGE UP (ref 6.6–8.7)
SODIUM SERPL-SCNC: 132 MMOL/L — LOW (ref 135–145)

## 2024-12-16 PROCEDURE — 99233 SBSQ HOSP IP/OBS HIGH 50: CPT

## 2024-12-16 RX ADMIN — Medication 60 MILLIGRAM(S): at 08:35

## 2024-12-16 RX ADMIN — FUROSEMIDE 40 MILLIGRAM(S): 40 TABLET ORAL at 06:34

## 2024-12-16 RX ADMIN — RANOLAZINE 500 MILLIGRAM(S): 1000 TABLET, FILM COATED, EXTENDED RELEASE ORAL at 06:35

## 2024-12-16 RX ADMIN — Medication 0: at 22:19

## 2024-12-16 RX ADMIN — ACETAMINOPHEN 500MG 650 MILLIGRAM(S): 500 TABLET, COATED ORAL at 16:08

## 2024-12-16 RX ADMIN — SODIUM ZIRCONIUM CYCLOSILICATE 10 GRAM(S): 5 POWDER, FOR SUSPENSION ORAL at 08:35

## 2024-12-16 RX ADMIN — ACETAMINOPHEN 500MG 650 MILLIGRAM(S): 500 TABLET, COATED ORAL at 14:00

## 2024-12-16 RX ADMIN — ACETAMINOPHEN 500MG 650 MILLIGRAM(S): 500 TABLET, COATED ORAL at 07:35

## 2024-12-16 RX ADMIN — Medication 80 MILLIGRAM(S): at 22:18

## 2024-12-16 RX ADMIN — Medication 5 UNIT(S): at 17:20

## 2024-12-16 RX ADMIN — ACETAMINOPHEN 500MG 650 MILLIGRAM(S): 500 TABLET, COATED ORAL at 15:38

## 2024-12-16 RX ADMIN — CLOPIDOGREL 75 MILLIGRAM(S): 75 TABLET, FILM COATED ORAL at 08:35

## 2024-12-16 RX ADMIN — ACETAMINOPHEN 500MG 650 MILLIGRAM(S): 500 TABLET, COATED ORAL at 23:42

## 2024-12-16 RX ADMIN — GABAPENTIN 300 MILLIGRAM(S): 300 CAPSULE ORAL at 22:18

## 2024-12-16 RX ADMIN — Medication 5 UNIT(S): at 08:39

## 2024-12-16 RX ADMIN — ACETAMINOPHEN 500MG 650 MILLIGRAM(S): 500 TABLET, COATED ORAL at 00:27

## 2024-12-16 RX ADMIN — RANOLAZINE 500 MILLIGRAM(S): 1000 TABLET, FILM COATED, EXTENDED RELEASE ORAL at 17:20

## 2024-12-16 RX ADMIN — ACETAMINOPHEN 500MG 650 MILLIGRAM(S): 500 TABLET, COATED ORAL at 06:35

## 2024-12-16 RX ADMIN — Medication 5 UNIT(S): at 13:02

## 2024-12-16 RX ADMIN — Medication 81 MILLIGRAM(S): at 08:34

## 2024-12-16 RX ADMIN — INSULIN GLARGINE 26 UNIT(S): 100 INJECTION, SOLUTION SUBCUTANEOUS at 22:18

## 2024-12-16 RX ADMIN — ERTAPENEM 100 MILLIGRAM(S): 1 INJECTION, POWDER, LYOPHILIZED, FOR SOLUTION INTRAMUSCULAR; INTRAVENOUS at 17:21

## 2024-12-16 RX ADMIN — AMLODIPINE BESYLATE 10 MILLIGRAM(S): 10 TABLET ORAL at 06:35

## 2024-12-16 RX ADMIN — Medication 2: at 08:38

## 2024-12-16 RX ADMIN — METOPROLOL TARTRATE 100 MILLIGRAM(S): 100 TABLET, FILM COATED ORAL at 06:35

## 2024-12-16 RX ADMIN — ACETAMINOPHEN 500MG 650 MILLIGRAM(S): 500 TABLET, COATED ORAL at 13:01

## 2024-12-16 NOTE — DISCHARGE NOTE NURSING/CASE MANAGEMENT/SOCIAL WORK - FINANCIAL ASSISTANCE
[facility] provides services at a reduced cost to those who are determined to be eligible through [facility]’s financial assistance program. Information regarding [facility]’s financial assistance program can be found by going to [email or web address] or by calling [phone].

## 2024-12-16 NOTE — PROGRESS NOTE ADULT - TIME BILLING
For chart review, face to face evaluation, counselling and care coordination
Review of chart, discussion with patient and family at bedside

## 2024-12-16 NOTE — DISCHARGE NOTE NURSING/CASE MANAGEMENT/SOCIAL WORK - PATIENT PORTAL LINK FT
You can access the FollowMyHealth Patient Portal offered by [facility] by registering at the following website: [email or web address]. By joining [employer]’s FollowMyHealth portal, you will also be able to view your health information using other applications (apps) compatible with our system.

## 2024-12-16 NOTE — PROGRESS NOTE ADULT - SUBJECTIVE AND OBJECTIVE BOX
Podiatry Interval: Patient was seen bedside resting comfortably. No acute overnight events noted. Patient states she is still having pain around the contusion site, but overall improved from when she was first admitted. Patient denies any open lesions. Patient denies any numbness, tingling, or burning. Patient denies calf tenderness bilaterally. She has been compliant with keeping her ACE wrap clean, dry, and intact. She has also been remaining NWB to the E at all times. Patient denies any other pedal complaints and no constitutional symptoms such as F/C/N/V/SOB. AAOx3, NAD.    Patient is a 74y old  Female who presents with a chief complaint of Acute on Chronic HFrEF Exacerbation (14 Dec 2024 16:08)    Podiatry HPI: 74F with PMHX CAD s/p PCI, HFrEF, Mobitz II s/p PPM (MIRNA Dawn), PAD with chronic right LE wound, CVA, DM2, CKD3, HTN presents to [facility] ER c/o shortness of breath/RAMIREZ and worsening LE edema. ROS +nonradiating CP and nonproductive cough. Patient states while in the ED an oxygen tank fell on her foot and she has had bruising and swelling since the incident happened. Patient states as of the last 2 days it has become more painful to walk and pain to touch. No open wounds noted     HPI: Pt seen/examined prior to midnight on 12/3/24. 74F with PMHX CAD s/p PCI, HFrEF, Mobitz II s/p PPM (MIRNA Danw), PAD with chronic right LE wound, CVA, DM2, CKD3, HTN presents to [facility] ER c/o shortness of breath/RAMIREZ and worsening LE edema. ROS +nonradiating CP and nonproductive cough. Pt recently admitted for decompensated CHF and was doing well until she noted increased weight gain and called her Cardiologist Dr Allen who advised her to come to ER for further evaluation. Pt normally takes Lasix 20mg daily but took 40mg today PTA with improvement. CXR with congestive findings and mild effusions bilaterally. Crackles and pitting edema on exam. Labs notable for leukocytosis, CKD, and hyperkalemia.       Medications:  acetaminophen     Tablet .. 650 milliGRAM(s) Oral every 6 hours  acetaminophen     Tablet .. 650 milliGRAM(s) Oral every 6 hours PRN  aluminum hydroxide/magnesium hydroxide/simethicone Suspension 30 milliLiter(s) Oral every 4 hours PRN  amLODIPine   Tablet 10 milliGRAM(s) Oral daily  aspirin enteric coated 81 milliGRAM(s) Oral daily  atorvastatin 80 milliGRAM(s) Oral at bedtime  clopidogrel Tablet 75 milliGRAM(s) Oral daily  dextrose 5%. 1000 milliLiter(s) IV Continuous <Continuous>  dextrose 5%. 1000 milliLiter(s) IV Continuous <Continuous>  dextrose 50% Injectable 25 Gram(s) IV Push once  dextrose 50% Injectable 12.5 Gram(s) IV Push once  dextrose 50% Injectable 25 Gram(s) IV Push once  dextrose Oral Gel 15 Gram(s) Oral once PRN  ertapenem  IVPB 500 milliGRAM(s) IV Intermittent every 24 hours  furosemide    Tablet 40 milliGRAM(s) Oral daily  gabapentin 300 milliGRAM(s) Oral at bedtime  glucagon  Injectable 1 milliGRAM(s) IntraMuscular once  insulin glargine Injectable (LANTUS) 26 Unit(s) SubCutaneous at bedtime  insulin lispro (ADMELOG) corrective regimen sliding scale   SubCutaneous Before meals and at bedtime  insulin lispro Injectable (ADMELOG) 5 Unit(s) SubCutaneous three times a day before meals  isosorbide   mononitrate ER Tablet (IMDUR) 60 milliGRAM(s) Oral daily  melatonin 3 milliGRAM(s) Oral at bedtime PRN  metoprolol succinate  milliGRAM(s) Oral daily  ondansetron Injectable 4 milliGRAM(s) IV Push every 8 hours PRN  oxyCODONE    IR 5 milliGRAM(s) Oral every 6 hours PRN  ranolazine 500 milliGRAM(s) Oral two times a day  sodium zirconium cyclosilicate 10 Gram(s) Oral daily    FHx:  Family history of diabetes mellitus  FH: asthma    PMHx:   DM (diabetes mellitus)  HTN (hypertension)  Acute otitis media, unspecified otitis media type  H/O gastroesophageal reflux (GERD)  Cardiac pacemaker  CVA (cerebrovascular accident)  CVA (cerebrovascular accident)  PAD (peripheral artery disease)  Wound of right leg     PSHx:  History of benign brain tumor  Cataract  History of biopsy  Cardiac pacemaker  S/P angiogram of extremity      Labs                          11.4   8.49  )-----------( 437      ( 15 Dec 2024 05:35 )             36.7      12-16    132[L]  |  97  |  55.5[H]  ----------------------------<  210[H]  5.2   |  22.0  |  1.89[H]    Ca    9.5      16 Dec 2024 04:56  Phos  6.1     12-16  Mg     2.9     12-16    TPro  7.3  /  Alb  3.6  /  TBili  0.3[L]  /  DBili  x   /  AST  10  /  ALT  10  /  AlkPhos  95  12-16     Vital Signs Last 24 Hrs  T(C): 36.3 (16 Dec 2024 12:30), Max: 36.6 (15 Dec 2024 17:27)  T(F): 97.3 (16 Dec 2024 12:30), Max: 97.8 (15 Dec 2024 17:27)  HR: 65 (16 Dec 2024 12:30) (62 - 72)  BP: 117/71 (16 Dec 2024 12:30) (117/71 - 156/73)  BP(mean): 86 (16 Dec 2024 12:30) (86 - 86)  RR: 18 (16 Dec 2024 12:30) (18 - 18)  SpO2: 95% (16 Dec 2024 12:30) (95% - 99%)    Parameters below as of 16 Dec 2024 08:31  Patient On (Oxygen Delivery Method): room air    Sedimentation Rate, Erythrocyte: 63 mm/hr (10-10-24 @ 05:16)  Sedimentation Rate, Erythrocyte: 72 mm/hr (10-09-24 @ 06:00)  C-Reactive Protein: 26 mg/L (10-09-24 @ 04:43)      LE FOCUSED PHYSICAL EXAM:     Vasc: DP/PT 1/4 bilaterally. CFT <3 seconds x 10. Temp Gradient within normal limits bilaterally, with mild increase in warmth over the right foot. Moderate non-pitting edema without erythema localized to the contusion site. No varicosities observed bilaterally.    Derm: Ecchymosis noted, no appreciable hematoma is noted. No open wounds or lesions noted.   Derm: Skin appears supple, well-hydrated, and intact bilaterally. R dorsal forefoot to midfoot ecchymosis and non-pitting edema noted to the 2-4th digits. No open lesions, no ulcerations, no skin tenting, no skin blanching, no fracture blisters, no purulence, no drainage, no soft tissue crepitus, no fluctuance, no IDM, no streaking, no signs of an acute/active infection.  Neuro: AAOx3, NAD. Protective sensation is intact via ipswitch 4/4 bilaterally. No paresthesias bilaterally.  MSK: Muscle strength deferred. Moderate pain upon palpation over the right foot contusion. Moderate pain upon ROM of the right foot pedal joints. No crepitation upon ROM of joints. Negative calf tenderness bilaterally. Compartments are compressible, patient able to wiggle toes without pain out of proportion.       IMAGING:     ACC: [id number] EXAM:  XR FOOT COMP MIN 3 VIEWS RT   ORDERED BY: KERILNE DOCKERY   PROCEDURE DATE:  12/04/2024    INTERPRETATION:  INDICATION: Trauma to the right foot.  TECHNIQUE: 3 views of the right foot were obtained    FINDINGS: No fracture or dislocation is seen. There is mild hallux   valgus. There is a small plantar calcaneal spur. There are vascular   calcifications. There are overlying bandages.    IMPRESSION: No fracture or dislocation visualized in the right foot.    --- End of Report ---      ACC: [id number] EXAM:  CT FOOT ONLY RT   ORDERED BY: DARLENE RUIZ   PROCEDURE DATE:  12/10/2024    INTERPRETATION:  CT OF THE RIGHT FOOT  CLINICAL INFORMATION: Right foot pain redness and swelling.  TECHNIQUE: Multidetector CT of the right foot. The study was performed   without the use of intravenous or intra-articular contrast. Multiplanar   reformats were generated for review.    COMPARISON: Right foot radiographs 12/4/2024 and 3/2/2024.    FINDINGS:    BONE: No acute fracture. No cortical destruction or periosteal new bone   formation.    JOINTS: No dislocation. Cartilage spaces are maintained.    SOFT TISSUE: Soft tissue swelling over the dorsum of the foot at the   level of the metatarsals. No focal drainable fluid collection.Mild   atrophy of the intrinsic muscles of the foot. Normal CT appearance of the   imaged tendons. Vascular calcifications. No tracking subcutaneous   emphysema.    IMPRESSION:  1.  Edema in the subcutaneous fat along the dorsum of the foot. No   drainable fluid collection is identified.  2.  No CT evidence of advanced osteomyelitis.    --- End of Report ---      ACC: [id number] EXAM:  US NONVASC EXT LTD RT   ORDERED BY: GARTH IBARRA   PROCEDURE DATE:  12/15/2024    INTERPRETATION:  CLINICAL INFORMATION: Swelling over the right foot.  COMPARISON: Right foot CT dated 12/10/2024  TECHNIQUE:  Ultrasound the right foot is performed.    IMPRESSION:    There is 5.3 x 3.1 x 0.8 cm complex subcutaneous fluid collection along   the dorsal aspect of the right fourth. The collections contain diffuse   internal fine echoes. No internal vascularity. This may represent an   abscess.    --- End of Report ---

## 2024-12-16 NOTE — PROGRESS NOTE ADULT - SUBJECTIVE AND OBJECTIVE BOX
[facility] Division of Hospital Medicine  Hillary Padilla MD   I'm reachable on [employer]      Patient is a 74y old  Female who presents with a chief complaint of Acute on Chronic HFrEF Exacerbation (16 Dec 2024 14:21)      SUBJECTIVE / OVERNIGHT EVENTS:   Patient was seen and examined during rounds  Still reports right foot pain but improved.   Discussed with podiatry resident. Recommended inpatient monitoring   Potassium stable. Discussed iwth Dr. Ramachandran. No further plan      12 points ROS negative except above    MEDICATIONS  (STANDING):  acetaminophen     Tablet .. 650 milliGRAM(s) Oral every 6 hours  amLODIPine   Tablet 10 milliGRAM(s) Oral daily  aspirin enteric coated 81 milliGRAM(s) Oral daily  atorvastatin 80 milliGRAM(s) Oral at bedtime  clopidogrel Tablet 75 milliGRAM(s) Oral daily  dextrose 5%. 1000 milliLiter(s) (50 mL/Hr) IV Continuous <Continuous>  dextrose 5%. 1000 milliLiter(s) (100 mL/Hr) IV Continuous <Continuous>  dextrose 50% Injectable 25 Gram(s) IV Push once  dextrose 50% Injectable 12.5 Gram(s) IV Push once  dextrose 50% Injectable 25 Gram(s) IV Push once  ertapenem  IVPB 500 milliGRAM(s) IV Intermittent every 24 hours  furosemide    Tablet 40 milliGRAM(s) Oral daily  gabapentin 300 milliGRAM(s) Oral at bedtime  glucagon  Injectable 1 milliGRAM(s) IntraMuscular once  insulin glargine Injectable (LANTUS) 26 Unit(s) SubCutaneous at bedtime  insulin lispro (ADMELOG) corrective regimen sliding scale   SubCutaneous Before meals and at bedtime  insulin lispro Injectable (ADMELOG) 5 Unit(s) SubCutaneous three times a day before meals  isosorbide   mononitrate ER Tablet (IMDUR) 60 milliGRAM(s) Oral daily  metoprolol succinate  milliGRAM(s) Oral daily  ranolazine 500 milliGRAM(s) Oral two times a day  sodium zirconium cyclosilicate 10 Gram(s) Oral daily    MEDICATIONS  (PRN):  acetaminophen     Tablet .. 650 milliGRAM(s) Oral every 6 hours PRN Temp greater or equal to 38C (100.4F), Mild Pain (1 - 3)  aluminum hydroxide/magnesium hydroxide/simethicone Suspension 30 milliLiter(s) Oral every 4 hours PRN Dyspepsia  dextrose Oral Gel 15 Gram(s) Oral once PRN Blood Glucose LESS THAN 70 milliGRAM(s)/deciliter  melatonin 3 milliGRAM(s) Oral at bedtime PRN Insomnia  ondansetron Injectable 4 milliGRAM(s) IV Push every 8 hours PRN Nausea and/or Vomiting  oxyCODONE    IR 5 milliGRAM(s) Oral every 6 hours PRN Severe Pain (7 - 10)        I&O's Summary    15 Dec 2024 07:01  -  16 Dec 2024 07:00  --------------------------------------------------------  IN: 240 mL / OUT: 1000 mL / NET: -760 mL        PHYSICAL EXAM:  Vital Signs Last 24 Hrs  T(C): 36.3 (16 Dec 2024 12:30), Max: 36.6 (15 Dec 2024 17:27)  T(F): 97.3 (16 Dec 2024 12:30), Max: 97.8 (15 Dec 2024 17:27)  HR: 65 (16 Dec 2024 12:30) (62 - 72)  BP: 117/71 (16 Dec 2024 12:30) (117/71 - 156/73)  BP(mean): 86 (16 Dec 2024 12:30) (86 - 86)  RR: 18 (16 Dec 2024 12:30) (18 - 18)  SpO2: 95% (16 Dec 2024 12:30) (95% - 99%)    Parameters below as of 16 Dec 2024 08:31  Patient On (Oxygen Delivery Method): room air        CONSTITUTIONAL: NAD, Not in acute distress  EYES:  EOMI, conjunctiva and sclera clear  ENMT: , neck supple , non-tender  RESPIRATORY: Normal respiratory effort; lungs are clear to auscultation bilaterally  CARDIOVASCULAR: S1S2 present  ABDOMEN: soft. bowel sound present  MUSCLOSKELETAL: ; no clubbing or cyanosis of digits;   PSYCH: A+O to person, place, and time; affect appropriate  NEUROLOGY: CN, motor and sensory intact   SKIN: No rashes; no palpable lesions  Extremity: No bilateral edema      LABS:                        11.4   8.49  )-----------( 437      ( 15 Dec 2024 05:35 )             36.7     12-16    132[L]  |  97  |  55.5[H]  ----------------------------<  210[H]  5.2   |  22.0  |  1.89[H]    Ca    9.5      16 Dec 2024 04:56  Phos  6.1     12-16  Mg     2.9     12-16    TPro  7.3  /  Alb  3.6  /  TBili  0.3[L]  /  DBili  x   /  AST  10  /  ALT  10  /  AlkPhos  95  12-16          Urinalysis Basic - ( 16 Dec 2024 04:56 )    Color: x / Appearance: x / SG: x / pH: x  Gluc: 210 mg/dL / Ketone: x  / Bili: x / Urobili: x   Blood: x / Protein: x / Nitrite: x   Leuk Esterase: x / RBC: x / WBC x   Sq Epi: x / Non Sq Epi: x / Bacteria: x        CAPILLARY BLOOD GLUCOSE      POCT Blood Glucose.: 146 mg/dL (16 Dec 2024 12:37)  POCT Blood Glucose.: 207 mg/dL (16 Dec 2024 08:32)  POCT Blood Glucose.: 169 mg/dL (15 Dec 2024 21:36)  POCT Blood Glucose.: 140 mg/dL (15 Dec 2024 17:08)      Labs reviewed:    RADIOLOGY & ADDITIONAL TESTS:  Imaging Personally Reviewed:  Electrocardiogram Personally Reviewed:

## 2024-12-16 NOTE — PROGRESS NOTE ADULT - TIME-BASED BILLING (NON-CRITICAL CARE)
Problem: Prexisting or High Potential for Compromised Skin Integrity  Goal: Skin integrity is maintained or improved  Description: INTERVENTIONS:  - Identify patients at risk for skin breakdown  - Assess and monitor skin integrity  - Assess and monitor nutrition and hydration status  - Monitor labs   - Assess for incontinence   - Turn and reposition patient  - Assist with mobility/ambulation  - Relieve pressure over bony prominences  - Avoid friction and shearing  - Provide appropriate hygiene as needed including keeping skin clean and dry  - Evaluate need for skin moisturizer/barrier cream  - Collaborate with interdisciplinary team   - Patient/family teaching  - Consider wound care consult   Outcome: Progressing     Problem: PAIN - ADULT  Goal: Verbalizes/displays adequate comfort level or baseline comfort level  Description: Interventions:  - Encourage patient to monitor pain and request assistance  - Assess pain using appropriate pain scale  - Administer analgesics based on type and severity of pain and evaluate response  - Implement non-pharmacological measures as appropriate and evaluate response  - Consider cultural and social influences on pain and pain management  - Notify physician/advanced practitioner if interventions unsuccessful or patient reports new pain  Outcome: Progressing     Problem: INFECTION - ADULT  Goal: Absence or prevention of progression during hospitalization  Description: INTERVENTIONS:  - Assess and monitor for signs and symptoms of infection  - Monitor lab/diagnostic results  - Monitor all insertion sites, i e  indwelling lines, tubes, and drains  - [place] appropriate cooling/warming therapies per order  - Administer medications as ordered  - Instruct and encourage patient and family to use good hand hygiene technique  - Identify and instruct in appropriate isolation precautions for identified infection/condition  Outcome: Progressing     Problem: SAFETY ADULT  Goal: Patient will
Time-based billing (NON-critical care)
remain free of falls  Description: INTERVENTIONS:  - Educate patient/family on patient safety including physical limitations  - Instruct patient to call for assistance with activity   - Consult OT/PT to assist with strengthening/mobility   - Keep Call bell within reach  - Keep bed low and locked with side rails adjusted as appropriate  - Keep care items and personal belongings within reach  - Initiate and maintain comfort rounds  - Make Fall Risk Sign visible to staff  - Offer Toileting every 2 Hours, in advance of need  - Initiate/Maintain alarm  - Obtain necessary fall risk management equipment:  - Apply yellow socks and bracelet for high fall risk patients  - Consider moving patient to room near nurses station  Outcome: Progressing  Goal: Maintain or return to baseline ADL function  Description: INTERVENTIONS:  -  Assess patient's ability to carry out ADLs; assess patient's baseline for ADL function and identify physical deficits which impact ability to perform ADLs (bathing, care of mouth/teeth, toileting, grooming, dressing, etc )  - Assess/evaluate cause of self-care deficits   - Assess range of motion  - Assess patient's mobility; develop plan if impaired  - Assess patient's need for assistive devices and provide as appropriate  - Encourage maximum independence but intervene and supervise when necessary  - Involve family in performance of ADLs  - Assess for home care needs following discharge   - Consider OT consult to assist with ADL evaluation and planning for discharge  - Provide patient education as appropriate  Outcome: Progressing  Goal: Maintains/Returns to pre admission functional level  Description: INTERVENTIONS:  - Perform BMAT or MOVE assessment daily    - Set and communicate daily mobility goal to care team and patient/family/caregiver     - Collaborate with rehabilitation services on mobility goals if consulted  - Perform Range of Motion   - Reposition patient every   - Dangle patient  - Stand
patient   - Ambulate patient   - Out of bed to chair   - Out of bed for meals   - Out of bed for toileting  - Record patient progress and toleration of activity level   Outcome: Progressing     Problem: DISCHARGE PLANNING  Goal: Discharge to home or other facility with appropriate resources  Description: INTERVENTIONS:  - Identify barriers to discharge w/patient and caregiver  - Arrange for needed discharge resources and transportation as appropriate  - Identify discharge learning needs (meds, wound care, etc )  - Arrange for interpretive services to assist at discharge as needed  - Refer to Case Management Department for coordinating discharge planning if the patient needs post-hospital services based on physician/advanced practitioner order or complex needs related to functional status, cognitive ability, or social support system  Outcome: Progressing     Problem: Knowledge Deficit  Goal: Patient/family/caregiver demonstrates understanding of disease process, treatment plan, medications, and discharge instructions  Description: Complete learning assessment and assess knowledge base    Interventions:  - Provide teaching at level of understanding  - Provide teaching via preferred learning methods  Outcome: Progressing     Problem: MOBILITY - ADULT  Goal: Maintain or return to baseline ADL function  Description: INTERVENTIONS:  -  Assess patient's ability to carry out ADLs; assess patient's baseline for ADL function and identify physical deficits which impact ability to perform ADLs (bathing, care of mouth/teeth, toileting, grooming, dressing, etc )  - Assess/evaluate cause of self-care deficits   - Assess range of motion  - Assess patient's mobility; develop plan if impaired  - Assess patient's need for assistive devices and provide as appropriate  - Encourage maximum independence but intervene and supervise when necessary  - Involve family in performance of ADLs  - Assess for home care needs following discharge   -
Consider OT consult to assist with ADL evaluation and planning for discharge  - Provide patient education as appropriate  Outcome: Progressing  Goal: Maintains/Returns to pre admission functional level  Description: INTERVENTIONS:  - Perform BMAT or MOVE assessment daily    - Set and communicate daily mobility goal to care team and patient/family/caregiver     - Collaborate with rehabilitation services on mobility goals if consulted  - Perform Range of Motion   - Reposition patient every   - Dangle patient  - Stand patient   - Ambulate patient   - Out of bed to chair   - Out of bed for meals   - Out of bed for toileting  - Record patient progress and toleration of activity level   Outcome: Progressing     Problem: Potential for Falls  Goal: Patient will remain free of falls  Description: INTERVENTIONS:  - Educate patient/family on patient safety including physical limitations  - Instruct patient to call for assistance with activity   - Consult OT/PT to assist with strengthening/mobility   - Keep Call bell within reach  - Keep bed low and locked with side rails adjusted as appropriate  - Keep care items and personal belongings within reach  - Initiate and maintain comfort rounds  - Make Fall Risk Sign visible to staff  - Offer Toileting every 2 Hours, in advance of need  - Initiate/Maintain alarm  - Obtain necessary fall risk management equipment:  - Apply yellow socks and bracelet for high fall risk patients  - Consider moving patient to room near nurses station  Outcome: Progressing
Time-based billing (NON-critical care)

## 2024-12-16 NOTE — PROGRESS NOTE ADULT - SUBJECTIVE AND OBJECTIVE BOX
NEPHROLOGY INTERVAL HPI/OVERNIGHT EVENTS:  pt comfortable when seen earlier  family at bedside who acted as     MEDICATIONS  (STANDING):  acetaminophen     Tablet .. 650 milliGRAM(s) Oral every 6 hours  amLODIPine   Tablet 10 milliGRAM(s) Oral daily  aspirin enteric coated 81 milliGRAM(s) Oral daily  atorvastatin 80 milliGRAM(s) Oral at bedtime  clopidogrel Tablet 75 milliGRAM(s) Oral daily  dextrose 5%. 1000 milliLiter(s) (50 mL/Hr) IV Continuous <Continuous>  dextrose 5%. 1000 milliLiter(s) (100 mL/Hr) IV Continuous <Continuous>  dextrose 50% Injectable 25 Gram(s) IV Push once  dextrose 50% Injectable 12.5 Gram(s) IV Push once  dextrose 50% Injectable 25 Gram(s) IV Push once  ertapenem  IVPB 500 milliGRAM(s) IV Intermittent every 24 hours  furosemide    Tablet 40 milliGRAM(s) Oral daily  gabapentin 300 milliGRAM(s) Oral at bedtime  glucagon  Injectable 1 milliGRAM(s) IntraMuscular once  insulin glargine Injectable (LANTUS) 26 Unit(s) SubCutaneous at bedtime  insulin lispro (ADMELOG) corrective regimen sliding scale   SubCutaneous Before meals and at bedtime  insulin lispro Injectable (ADMELOG) 5 Unit(s) SubCutaneous three times a day before meals  isosorbide   mononitrate ER Tablet (IMDUR) 60 milliGRAM(s) Oral daily  metoprolol succinate  milliGRAM(s) Oral daily  ranolazine 500 milliGRAM(s) Oral two times a day  sodium zirconium cyclosilicate 10 Gram(s) Oral daily    MEDICATIONS  (PRN):  acetaminophen     Tablet .. 650 milliGRAM(s) Oral every 6 hours PRN Temp greater or equal to 38C (100.4F), Mild Pain (1 - 3)  aluminum hydroxide/magnesium hydroxide/simethicone Suspension 30 milliLiter(s) Oral every 4 hours PRN Dyspepsia  dextrose Oral Gel 15 Gram(s) Oral once PRN Blood Glucose LESS THAN 70 milliGRAM(s)/deciliter  melatonin 3 milliGRAM(s) Oral at bedtime PRN Insomnia  ondansetron Injectable 4 milliGRAM(s) IV Push every 8 hours PRN Nausea and/or Vomiting  oxyCODONE    IR 5 milliGRAM(s) Oral every 6 hours PRN Severe Pain (7 - 10)      Allergies    No Known Allergies    Intolerances              Vital Signs Last 24 Hrs  T(C): 36.3 (16 Dec 2024 12:30), Max: 36.6 (15 Dec 2024 17:27)  T(F): 97.3 (16 Dec 2024 12:30), Max: 97.8 (15 Dec 2024 17:27)  HR: 65 (16 Dec 2024 12:30) (62 - 72)  BP: 117/71 (16 Dec 2024 12:30) (117/71 - 156/73)  BP(mean): 86 (16 Dec 2024 12:30) (86 - 86)  RR: 18 (16 Dec 2024 12:30) (18 - 18)  SpO2: 95% (16 Dec 2024 12:30) (95% - 99%)    Parameters below as of 16 Dec 2024 08:31  Patient On (Oxygen Delivery Method): room air        PHYSICAL EXAM:  GENERAL: Fatigued, comfortable  HEENT: No periorbital edema  NECK: Supple, No JVD  NERVOUS SYSTEM: Awake, alert  CHEST: Clear, diminished BS  HEART:  RRR, No rub  ABDOMEN: Soft,  BS+  EXTREMITIES:  No sig LE edema  SKIN: No rash    LABS:                        11.4   8.49  )-----------( 437      ( 15 Dec 2024 05:35 )             36.7     12-16    132[L]  |  97  |  55.5[H]  ----------------------------<  210[H]  5.2   |  22.0  |  1.89[H]    Ca    9.5      16 Dec 2024 04:56  Phos  6.1     12-16  Mg     2.9     12-16    TPro  7.3  /  Alb  3.6  /  TBili  0.3[L]  /  DBili  x   /  AST  10  /  ALT  10  /  AlkPhos  95  12-16      Urinalysis Basic - ( 16 Dec 2024 04:56 )    Color: x / Appearance: x / SG: x / pH: x  Gluc: 210 mg/dL / Ketone: x  / Bili: x / Urobili: x   Blood: x / Protein: x / Nitrite: x   Leuk Esterase: x / RBC: x / WBC x   Sq Epi: x / Non Sq Epi: x / Bacteria: x      Magnesium: 2.9 mg/dL (12-16 @ 04:56)  Phosphorus: 6.1 mg/dL (12-16 @ 04:56)      RADIOLOGY & ADDITIONAL TESTS:

## 2024-12-16 NOTE — PROGRESS NOTE ADULT - ASSESSMENT
74 F with PMHX CAD s/p PCI, HFrEF, Mobitz II s/p PPM (MIRNA Dawn), PAD with chronic right LE wound, CVA, DM2, CKD3, HTN presents to [facility] ER c/o shortness of breath/RAMIREZ and worsening LE edema admitted for Acute on Chronic HFrEF Exacerbation and Hyperkalemia. Now s;/p diuresis and cardiomems placement with improvement. Found to have ESBL E.Coli UTI .    #Acute Hypoxic Respiratory failure secondary to   #Acute on Chronic CHF wirh reduced EF 2/2 isch CM s/p PCIs with MVD / CAD  -Resolved, comfortable on RA now   -Daily weight, I/O, salt and fluid restriction  -Furosemide 40mg PO daily  -spironolactone and Losartan on hold due to hyperkalemia  -Continue DAPT   -Continue Ranexa   -Continue Statin   -Metoprolol  -TTE reviewed  -s/p RHC and Cardiomems placement    #Foot pain:   - Intial xrays done shows no fracture,- CT scan is negative for fracture  -But, right foot pain and swelling is worsening  -Repeat Xray concerning for evulsion fracture  -US concerning for abscess but Podiatry recommended most likely hematoma and recommended conservative management  -Podiatry consulted  -Pain control with Ice pack , Oxycodone, ace wrap      #Hyperkalemia   5.5 (non hemolyzed today)  Lokelma 10 mg daily  held Aldactone to 12.5 mg daily   Nephrologist Dr. Montgomery consulted    #BRADY on CKD stage 3b   Hyperkalemia at presentation, Resolved  Based on prior renla inces, Cr Cl more or ess at baseline  Permissive Azotemia with diuretics acceptable for cardiac optimization   Avoid Nephrotoxins  Hold aldectone and ARNi due to hyperkalemia  [place] nephrology consulted    #Anemia ( Iron deficiency)  Stable   completed IV Venofer 200 mg  monitor Hgb  Outpt GI colonoscopy or screening   Hb is ~10    #CAD s/p PCI,   Mobitz Type 2 s/p MicraPPM  DAPT, Statin, BB, Nitrate and antianginal    #Recurring UTIs  Per son, h/o recurrent UTIs  Now with ESBL E.Coli. No oral options  Received Meropenem for 3 days--> 2 days of ertapenem     #Diabetes Mellitus type2 on insulin with neuropathy   A1c 7.1  Improving   Hold oral hypoglycemic agents  Blood glucose monitoring with sliding scale  Glargine 26U  Lispro 5U premeal tid  Lispro s/s   Gabapentin 300mg qHS      UTI: she has burning urination, UA suggestive of UTI, has been started on Ceftriaxone, will get urine cultures.      VTE Prophylaxis: SCDs/SQH [id number]    DISPO: Likely home with home PT in 1-2 days once clear by podiatry

## 2024-12-16 NOTE — PROGRESS NOTE ADULT - ASSESSMENT
BRADY, prerenal due to diuretics, decompensated CHF  Ischemic CM  (?) CKD >>> although Screat 0.90 July, 2024  Hyperkalemia, hyponatremia better  - avoid potential nephrotoxins  - cont diuretics and accept azotemia  - low NaCl, fluid and K+ diet  - cont Lokelma as needed  - holding Entresto and Aldactone  - follow labs

## 2024-12-17 LAB
ALBUMIN SERPL ELPH-MCNC: 3.8 G/DL — SIGNIFICANT CHANGE UP (ref 3.3–5.2)
ALP SERPL-CCNC: 77 U/L — SIGNIFICANT CHANGE UP (ref 40–120)
ALT FLD-CCNC: 9 U/L — SIGNIFICANT CHANGE UP
ANION GAP SERPL CALC-SCNC: 14 MMOL/L — SIGNIFICANT CHANGE UP (ref 5–17)
AST SERPL-CCNC: 13 U/L — SIGNIFICANT CHANGE UP
BILIRUB SERPL-MCNC: 0.3 MG/DL — LOW (ref 0.4–2)
BUN SERPL-MCNC: 57.9 MG/DL — HIGH (ref 8–20)
CALCIUM SERPL-MCNC: 9.5 MG/DL — SIGNIFICANT CHANGE UP (ref 8.4–10.5)
CHLORIDE SERPL-SCNC: 98 MMOL/L — SIGNIFICANT CHANGE UP (ref 96–108)
CO2 SERPL-SCNC: 21 MMOL/L — LOW (ref 22–29)
CREAT SERPL-MCNC: 1.73 MG/DL — HIGH (ref 0.5–1.3)
EGFR: 31 ML/MIN/1.73M2 — LOW
GLUCOSE BLDC GLUCOMTR-MCNC: 121 MG/DL — HIGH (ref 70–99)
GLUCOSE BLDC GLUCOMTR-MCNC: 133 MG/DL — HIGH (ref 70–99)
GLUCOSE BLDC GLUCOMTR-MCNC: 144 MG/DL — HIGH (ref 70–99)
GLUCOSE BLDC GLUCOMTR-MCNC: 209 MG/DL — HIGH (ref 70–99)
GLUCOSE SERPL-MCNC: 106 MG/DL — HIGH (ref 70–99)
HCT VFR BLD CALC: 32.6 % — LOW (ref 34.5–45)
HGB BLD-MCNC: 10.8 G/DL — LOW (ref 11.5–15.5)
MAGNESIUM SERPL-MCNC: 2.8 MG/DL — HIGH (ref 1.6–2.6)
MCHC RBC-ENTMCNC: 27.3 PG — SIGNIFICANT CHANGE UP (ref 27–34)
MCHC RBC-ENTMCNC: 33.1 G/DL — SIGNIFICANT CHANGE UP (ref 32–36)
MCV RBC AUTO: 82.5 FL — SIGNIFICANT CHANGE UP (ref 80–100)
PHOSPHATE SERPL-MCNC: 5.4 MG/DL — HIGH (ref 2.4–4.7)
PLATELET # BLD AUTO: 445 K/UL — HIGH (ref 150–400)
POTASSIUM SERPL-MCNC: 4.6 MMOL/L — SIGNIFICANT CHANGE UP (ref 3.5–5.3)
POTASSIUM SERPL-SCNC: 4.6 MMOL/L — SIGNIFICANT CHANGE UP (ref 3.5–5.3)
PROT SERPL-MCNC: 7.4 G/DL — SIGNIFICANT CHANGE UP (ref 6.6–8.7)
RBC # BLD: 3.95 M/UL — SIGNIFICANT CHANGE UP (ref 3.8–5.2)
RBC # FLD: 18.6 % — HIGH (ref 10.3–14.5)
SODIUM SERPL-SCNC: 133 MMOL/L — LOW (ref 135–145)
WBC # BLD: 7.95 K/UL — SIGNIFICANT CHANGE UP (ref 3.8–10.5)
WBC # FLD AUTO: 7.95 K/UL — SIGNIFICANT CHANGE UP (ref 3.8–10.5)

## 2024-12-17 PROCEDURE — 99232 SBSQ HOSP IP/OBS MODERATE 35: CPT

## 2024-12-17 RX ADMIN — INSULIN GLARGINE 26 UNIT(S): 100 INJECTION, SOLUTION SUBCUTANEOUS at 22:27

## 2024-12-17 RX ADMIN — FUROSEMIDE 40 MILLIGRAM(S): 40 TABLET ORAL at 06:19

## 2024-12-17 RX ADMIN — GABAPENTIN 300 MILLIGRAM(S): 300 CAPSULE ORAL at 22:28

## 2024-12-17 RX ADMIN — SODIUM ZIRCONIUM CYCLOSILICATE 10 GRAM(S): 5 POWDER, FOR SUSPENSION ORAL at 16:57

## 2024-12-17 RX ADMIN — METOPROLOL TARTRATE 100 MILLIGRAM(S): 100 TABLET, FILM COATED ORAL at 06:20

## 2024-12-17 RX ADMIN — Medication 2: at 22:24

## 2024-12-17 RX ADMIN — Medication 80 MILLIGRAM(S): at 22:27

## 2024-12-17 RX ADMIN — ACETAMINOPHEN 500MG 650 MILLIGRAM(S): 500 TABLET, COATED ORAL at 06:19

## 2024-12-17 RX ADMIN — Medication 5 UNIT(S): at 13:26

## 2024-12-17 RX ADMIN — Medication 81 MILLIGRAM(S): at 13:27

## 2024-12-17 RX ADMIN — RANOLAZINE 500 MILLIGRAM(S): 1000 TABLET, FILM COATED, EXTENDED RELEASE ORAL at 06:19

## 2024-12-17 RX ADMIN — CLOPIDOGREL 75 MILLIGRAM(S): 75 TABLET, FILM COATED ORAL at 13:26

## 2024-12-17 RX ADMIN — AMLODIPINE BESYLATE 10 MILLIGRAM(S): 10 TABLET ORAL at 06:20

## 2024-12-17 RX ADMIN — Medication 5 UNIT(S): at 08:24

## 2024-12-17 RX ADMIN — Medication 60 MILLIGRAM(S): at 13:27

## 2024-12-17 RX ADMIN — ACETAMINOPHEN 500MG 650 MILLIGRAM(S): 500 TABLET, COATED ORAL at 00:13

## 2024-12-17 RX ADMIN — ACETAMINOPHEN 500MG 650 MILLIGRAM(S): 500 TABLET, COATED ORAL at 06:40

## 2024-12-17 RX ADMIN — Medication 5 UNIT(S): at 16:57

## 2024-12-17 RX ADMIN — ACETAMINOPHEN 500MG 650 MILLIGRAM(S): 500 TABLET, COATED ORAL at 13:27

## 2024-12-17 RX ADMIN — ACETAMINOPHEN 500MG 650 MILLIGRAM(S): 500 TABLET, COATED ORAL at 14:26

## 2024-12-17 RX ADMIN — ONDANSETRON HYDROCHLORIDE 4 MILLIGRAM(S): 4 TABLET, FILM COATED ORAL at 15:16

## 2024-12-17 RX ADMIN — OXYCODONE HYDROCHLORIDE 5 MILLIGRAM(S): 30 TABLET ORAL at 13:27

## 2024-12-17 RX ADMIN — RANOLAZINE 500 MILLIGRAM(S): 1000 TABLET, FILM COATED, EXTENDED RELEASE ORAL at 16:57

## 2024-12-17 RX ADMIN — ACETAMINOPHEN 500MG 650 MILLIGRAM(S): 500 TABLET, COATED ORAL at 23:03

## 2024-12-17 RX ADMIN — OXYCODONE HYDROCHLORIDE 5 MILLIGRAM(S): 30 TABLET ORAL at 14:26

## 2024-12-17 RX ADMIN — ACETAMINOPHEN 500MG 650 MILLIGRAM(S): 500 TABLET, COATED ORAL at 22:33

## 2024-12-17 RX ADMIN — Medication 30 MILLILITER(S): at 06:31

## 2024-12-17 NOTE — PROGRESS NOTE ADULT - SUBJECTIVE AND OBJECTIVE BOX
Podiatry Interval: Patient was seen bedside resting comfortably. No acute overnight events noted. Patient states she is still having pain around the contusion site, but overall improved from when she was first admitted. Patient denies any open lesions. Patient denies any numbness, tingling, or burning. Patient denies calf tenderness bilaterally. She has been compliant with keeping her ACE wrap clean, dry, and intact. She has also been remaining NWB to the E at all times. Patient denies any other pedal complaints and no constitutional symptoms such as F/C/N/V/SOB. AAOx3, NAD.    Patient is a 74y old  Female who presents with a chief complaint of Acute on Chronic HFrEF Exacerbation (14 Dec 2024 16:08)    Podiatry HPI: 74F with PMHX CAD s/p PCI, HFrEF, Mobitz II s/p PPM (MIRNA Dawn), PAD with chronic right LE wound, CVA, DM2, CKD3, HTN presents to [facility] ER c/o shortness of breath/RAMIREZ and worsening LE edema. ROS +nonradiating CP and nonproductive cough. Patient states while in the ED an oxygen tank fell on her foot and she has had bruising and swelling since the incident happened. Patient states as of the last 2 days it has become more painful to walk and pain to touch. No open wounds noted     HPI: Pt seen/examined prior to midnight on 12/3/24. 74F with PMHX CAD s/p PCI, HFrEF, Mobitz II s/p PPM (MIRNA Dawn), PAD with chronic right LE wound, CVA, DM2, CKD3, HTN presents to [facility] ER c/o shortness of breath/RAMIREZ and worsening LE edema. ROS +nonradiating CP and nonproductive cough. Pt recently admitted for decompensated CHF and was doing well until she noted increased weight gain and called her Cardiologist Dr Allen who advised her to come to ER for further evaluation. Pt normally takes Lasix 20mg daily but took 40mg today PTA with improvement. CXR with congestive findings and mild effusions bilaterally. Crackles and pitting edema on exam. Labs notable for leukocytosis, CKD, and hyperkalemia.       Medications:  acetaminophen     Tablet .. 650 milliGRAM(s) Oral every 6 hours  acetaminophen     Tablet .. 650 milliGRAM(s) Oral every 6 hours PRN  aluminum hydroxide/magnesium hydroxide/simethicone Suspension 30 milliLiter(s) Oral every 4 hours PRN  amLODIPine   Tablet 10 milliGRAM(s) Oral daily  aspirin enteric coated 81 milliGRAM(s) Oral daily  atorvastatin 80 milliGRAM(s) Oral at bedtime  clopidogrel Tablet 75 milliGRAM(s) Oral daily  dextrose 5%. 1000 milliLiter(s) IV Continuous <Continuous>  dextrose 5%. 1000 milliLiter(s) IV Continuous <Continuous>  dextrose 50% Injectable 25 Gram(s) IV Push once  dextrose 50% Injectable 12.5 Gram(s) IV Push once  dextrose 50% Injectable 25 Gram(s) IV Push once  dextrose Oral Gel 15 Gram(s) Oral once PRN  furosemide    Tablet 40 milliGRAM(s) Oral daily  gabapentin 300 milliGRAM(s) Oral at bedtime  glucagon  Injectable 1 milliGRAM(s) IntraMuscular once  insulin glargine Injectable (LANTUS) 26 Unit(s) SubCutaneous at bedtime  insulin lispro (ADMELOG) corrective regimen sliding scale   SubCutaneous Before meals and at bedtime  insulin lispro Injectable (ADMELOG) 5 Unit(s) SubCutaneous three times a day before meals  isosorbide   mononitrate ER Tablet (IMDUR) 60 milliGRAM(s) Oral daily  melatonin 3 milliGRAM(s) Oral at bedtime PRN  metoprolol succinate  milliGRAM(s) Oral daily  ondansetron Injectable 4 milliGRAM(s) IV Push every 8 hours PRN  oxyCODONE    IR 5 milliGRAM(s) Oral every 6 hours PRN  ranolazine 500 milliGRAM(s) Oral two times a day  sodium zirconium cyclosilicate 10 Gram(s) Oral daily    FHx:  Family history of diabetes mellitus  FH: asthma    PMHx:   DM (diabetes mellitus)  HTN (hypertension)  Acute otitis media, unspecified otitis media type  H/O gastroesophageal reflux (GERD)  Cardiac pacemaker  CVA (cerebrovascular accident)  PAD (peripheral artery disease)  Wound of right leg    PSHx:  History of benign brain tumor  Cataract  History of biopsy  Cardiac pacemaker  S/P angiogram of extremity      Labs                          10.8   7.95  )-----------( 445      ( 17 Dec 2024 05:09 )             32.6      12-17    133[L]  |  98  |  57.9[H]  ----------------------------<  106[H]  4.6   |  21.0[L]  |  1.73[H]    Ca    9.5      17 Dec 2024 05:09  Phos  5.4     12-17  Mg     2.8     12-17    TPro  7.4  /  Alb  3.8  /  TBili  0.3[L]  /  DBili  x   /  AST  13  /  ALT  9   /  AlkPhos  77  12-17     Vital Signs Last 24 Hrs  T(C): 36.4 (17 Dec 2024 08:01), Max: 36.5 (17 Dec 2024 05:08)  T(F): 97.5 (17 Dec 2024 08:01), Max: 97.7 (17 Dec 2024 05:08)  HR: 69 (17 Dec 2024 08:01) (65 - 71)  BP: 121/72 (17 Dec 2024 08:01) (112/69 - 127/70)  BP(mean): 86 (16 Dec 2024 12:30) (86 - 86)  RR: 18 (17 Dec 2024 08:01) (18 - 18)  SpO2: 97% (17 Dec 2024 08:01) (95% - 98%)    Parameters below as of 17 Dec 2024 08:01  Patient On (Oxygen Delivery Method): room air    Sedimentation Rate, Erythrocyte: 63 mm/hr (10-10-24 @ 05:16)  Sedimentation Rate, Erythrocyte: 72 mm/hr (10-09-24 @ 06:00)         C-Reactive Protein: 26 mg/L (10-09-24 @ 04:43)  WBC Count: 7.95 K/uL (12-17-24 @ 05:09)      LE FOCUSED PHYSICAL EXAM:     Vasc: DP/PT 1/4 bilaterally. CFT <3 seconds x 10. Temp Gradient within normal limits bilaterally, with mild increase in warmth over the right foot. Moderate non-pitting edema without erythema localized to the contusion site, but significantly improved. No varicosities observed bilaterally.    Derm: Skin appears supple, well-hydrated, and intact bilaterally. R dorsal forefoot to midfoot ecchymosis and non-pitting edema noted to the 2-4th digits. No appreciable hematoma is noted. No open lesions, no ulcerations, no skin tenting, no skin blanching, no fracture blisters, no purulence, no drainage, no soft tissue crepitus, no fluctuance, no IDM, no streaking, no signs of an acute/active infection.  Neuro: AAOx3, NAD. Protective sensation is intact via ipswitch 4/4 bilaterally. No paresthesias bilaterally.  MSK: Muscle strength deferred. Moderate pain upon palpation over the right foot contusion. Moderate pain upon ROM of the right foot pedal joints. No crepitation upon ROM of joints. Negative calf tenderness bilaterally. Compartments are compressible, patient able to wiggle toes without pain out of proportion.       IMAGING:     ACC: [id number] EXAM:  XR FOOT COMP MIN 3 VIEWS RT   ORDERED BY: KERLINE DOCKERY   PROCEDURE DATE:  12/04/2024    INTERPRETATION:  INDICATION: Trauma to the right foot.  TECHNIQUE: 3 views of the right foot were obtained    FINDINGS: No fracture or dislocation is seen. There is mild hallux   valgus. There is a small plantar calcaneal spur. There are vascular   calcifications. There are overlying bandages.    IMPRESSION: No fracture or dislocation visualized in the right foot.    --- End of Report ---      ACC: [id number] EXAM:  XR FOOT COMP MIN 3 VIEWS RT   ORDERED BY: GARTH IBARRA   PROCEDURE DATE:  12/15/2024    INTERPRETATION:  DATE OF STUDY: 12/15/24  COMPARISON: 12/4/24 plain right foot films; 12/10/24 CT scan of right foot  CLINICAL HISTORY:  Right foot pain and swelling    FINDINGS:  3 views of the right foot are submitted.  No fracture or dislocation.  Mild hallux valgus.  Small plantar calcaneal spur.  No bony erosive or destructive lesions. No tracking of soft tissue air.  No vascular calcifications.    IMPRESSION:  No discrete osteomyelitic erosions.    --- End of Report ---      ACC: [id number] EXAM:  CT FOOT ONLY RT   ORDERED BY: DARLENE RUIZ   PROCEDURE DATE:  12/10/2024    INTERPRETATION:  CT OF THE RIGHT FOOT  CLINICAL INFORMATION: Right foot pain redness and swelling.  TECHNIQUE: Multidetector CT of the right foot. The study was performed   without the use of intravenous or intra-articular contrast. Multiplanar   reformats were generated for review.    COMPARISON: Right foot radiographs 12/4/2024 and 3/2/2024.    FINDINGS:    BONE: No acute fracture. No cortical destruction or periosteal new bone   formation.    JOINTS: No dislocation. Cartilage spaces are maintained.    SOFT TISSUE: Soft tissue swelling over the dorsum of the foot at the   level of the metatarsals. No focal drainable fluid collection.Mild   atrophy of the intrinsic muscles of the foot. Normal CT appearance of the   imaged tendons. Vascular calcifications. No tracking subcutaneous   emphysema.    IMPRESSION:  1.  Edema in the subcutaneous fat along the dorsum of the foot. No   drainable fluid collection is identified.  2.  No CT evidence of advanced osteomyelitis.    --- End of Report ---      ACC: [id number] EXAM:  US NONVASC EXT LTD RT   ORDERED BY: GARTH IBARRA   PROCEDURE DATE:  12/15/2024    INTERPRETATION:  CLINICAL INFORMATION: Swelling over the right foot.  COMPARISON: Right foot CT dated 12/10/2024  TECHNIQUE:  Ultrasound the right foot is performed.    IMPRESSION:    There is 5.3 x 3.1 x 0.8 cm complex subcutaneous fluid collection along   the dorsal aspect of the right fourth. The collections contain diffuse   internal fine echoes. No internal vascularity. This may represent an   abscess.    --- End of Report ---

## 2024-12-17 NOTE — PROGRESS NOTE ADULT - ASSESSMENT
A:  Right foot contusion   Possible right 3rd digit chip fracture    P:   Patient evaluated, chart reviewed and updated  Xray and CT reviewed from ED admission - no acute fractures noted   Repeat xray from 12/15 - questionable avulsion fx on the base of the 3rd digit proximal phalanx - no intervention warranted  Discussed diagnosis and treatment plans with the patient  Contusion without any appreciable hematoma - edema and ecchymosis seems to be improving at this time. No open lesions or signs of infection.   Re-applied ACE compression and an ice pack over the right foot  Please continue to ice the right foot and keep the RLE elevated  Patient may WBAT to the right heel   All questions and concerns addressed to the patient's satisfaction. Patient demonstrated verbal understanding.   Patient is stable from podiatry's standpoint. No surgical intervention or further examination are necessary at this time.   Upon discharge, patient can follow up outpatient with Dr. Yoel Ramirez at [address] [place] [address]. Please call [phone] to make an appointment within 1 week.   Seen, evaluated, and treated with attending Dr. Chavira

## 2024-12-17 NOTE — PROGRESS NOTE ADULT - SUBJECTIVE AND OBJECTIVE BOX
[facility] Division of Hospital Medicine  Hillary Padilla MD   I'm reachable on [employer]      Patient is a 74y old  Female who presents with a chief complaint of Acute on Chronic HFrEF Exacerbation (17 Dec 2024 11:31)      SUBJECTIVE / OVERNIGHT EVENTS:   Patient was seen and examined during rounds    Reports feeling better. Reports right foot pain is improving  family prefer to go [facility]  PT evaluation requested    12 points ROS negative except above    MEDICATIONS  (STANDING):  acetaminophen     Tablet .. 650 milliGRAM(s) Oral every 6 hours  amLODIPine   Tablet 10 milliGRAM(s) Oral daily  aspirin enteric coated 81 milliGRAM(s) Oral daily  atorvastatin 80 milliGRAM(s) Oral at bedtime  clopidogrel Tablet 75 milliGRAM(s) Oral daily  dextrose 5%. 1000 milliLiter(s) (50 mL/Hr) IV Continuous <Continuous>  dextrose 5%. 1000 milliLiter(s) (100 mL/Hr) IV Continuous <Continuous>  dextrose 50% Injectable 25 Gram(s) IV Push once  dextrose 50% Injectable 12.5 Gram(s) IV Push once  dextrose 50% Injectable 25 Gram(s) IV Push once  furosemide    Tablet 40 milliGRAM(s) Oral daily  gabapentin 300 milliGRAM(s) Oral at bedtime  glucagon  Injectable 1 milliGRAM(s) IntraMuscular once  insulin glargine Injectable (LANTUS) 26 Unit(s) SubCutaneous at bedtime  insulin lispro (ADMELOG) corrective regimen sliding scale   SubCutaneous Before meals and at bedtime  insulin lispro Injectable (ADMELOG) 5 Unit(s) SubCutaneous three times a day before meals  isosorbide   mononitrate ER Tablet (IMDUR) 60 milliGRAM(s) Oral daily  metoprolol succinate  milliGRAM(s) Oral daily  ranolazine 500 milliGRAM(s) Oral two times a day  sodium zirconium cyclosilicate 10 Gram(s) Oral daily    MEDICATIONS  (PRN):  acetaminophen     Tablet .. 650 milliGRAM(s) Oral every 6 hours PRN Temp greater or equal to 38C (100.4F), Mild Pain (1 - 3)  aluminum hydroxide/magnesium hydroxide/simethicone Suspension 30 milliLiter(s) Oral every 4 hours PRN Dyspepsia  dextrose Oral Gel 15 Gram(s) Oral once PRN Blood Glucose LESS THAN 70 milliGRAM(s)/deciliter  melatonin 3 milliGRAM(s) Oral at bedtime PRN Insomnia  ondansetron Injectable 4 milliGRAM(s) IV Push every 8 hours PRN Nausea and/or Vomiting  oxyCODONE    IR 5 milliGRAM(s) Oral every 6 hours PRN Severe Pain (7 - 10)        I&O's Summary    16 Dec 2024 07:01  -  17 Dec 2024 07:00  --------------------------------------------------------  IN: 500 mL / OUT: 500 mL / NET: 0 mL    17 Dec 2024 07:01  -  17 Dec 2024 15:25  --------------------------------------------------------  IN: 210 mL / OUT: 200 mL / NET: 10 mL        PHYSICAL EXAM:  Vital Signs Last 24 Hrs  T(C): 36.3 (17 Dec 2024 15:00), Max: 36.5 (17 Dec 2024 05:08)  T(F): 97.4 (17 Dec 2024 15:00), Max: 97.7 (17 Dec 2024 05:08)  HR: 67 (17 Dec 2024 15:00) (67 - 71)  BP: 156/77 (17 Dec 2024 15:00) (112/69 - 156/77)  BP(mean): --  RR: 18 (17 Dec 2024 15:00) (18 - 18)  SpO2: 97% (17 Dec 2024 15:00) (96% - 98%)    Parameters below as of 17 Dec 2024 15:00  Patient On (Oxygen Delivery Method): room air        CONSTITUTIONAL: NAD, Not in acute distress  EYES:  EOMI, conjunctiva and sclera clear  ENMT: , neck supple , non-tender  RESPIRATORY: Normal respiratory effort; lungs are clear to auscultation bilaterally  CARDIOVASCULAR: S1S2 present  ABDOMEN: soft. bowel sound present  MUSCLOSKELETAL: ; no clubbing or cyanosis of digits;   PSYCH: A+O to person, place, and time; affect appropriate  NEUROLOGY: CN, motor and sensory intact   SKIN: No rashes; no palpable lesions  Extremity: Right foot covered wtih ace wrap and dressing      LABS:                        10.8   7.95  )-----------( 445      ( 17 Dec 2024 05:09 )             32.6     12-17    133[L]  |  98  |  57.9[H]  ----------------------------<  106[H]  4.6   |  21.0[L]  |  1.73[H]    Ca    9.5      17 Dec 2024 05:09  Phos  5.4     12-17  Mg     2.8     12-17    TPro  7.4  /  Alb  3.8  /  TBili  0.3[L]  /  DBili  x   /  AST  13  /  ALT  9   /  AlkPhos  77  12-17          Urinalysis Basic - ( 17 Dec 2024 05:09 )    Color: x / Appearance: x / SG: x / pH: x  Gluc: 106 mg/dL / Ketone: x  / Bili: x / Urobili: x   Blood: x / Protein: x / Nitrite: x   Leuk Esterase: x / RBC: x / WBC x   Sq Epi: x / Non Sq Epi: x / Bacteria: x        CAPILLARY BLOOD GLUCOSE      POCT Blood Glucose.: 144 mg/dL (17 Dec 2024 12:40)  POCT Blood Glucose.: 133 mg/dL (17 Dec 2024 08:23)  POCT Blood Glucose.: 148 mg/dL (16 Dec 2024 22:17)  POCT Blood Glucose.: 136 mg/dL (16 Dec 2024 17:00)      Labs reviewed:    RADIOLOGY & ADDITIONAL TESTS:  Imaging Personally Reviewed:  Electrocardiogram Personally Reviewed:

## 2024-12-17 NOTE — PROGRESS NOTE ADULT - ASSESSMENT
74 F with PMHX CAD s/p PCI, HFrEF, Mobitz II s/p PPM (MIRNA Dawn), PAD with chronic right LE wound, CVA, DM2, CKD3, HTN presents to [facility] ER c/o shortness of breath/RAMIREZ and worsening LE edema admitted for Acute on Chronic HFrEF Exacerbation and Hyperkalemia. Now s;/p diuresis and cardiomems placement with improvement. Found to have ESBL E.Coli UTI .    #Acute Hypoxic Respiratory failure secondary to   #Acute on Chronic CHF wirh reduced EF 2/2 isch CM s/p PCIs with MVD / CAD  -Resolved, comfortable on RA now   -Furosemide 40mg PO daily  -spironolactone and Losartan on hold due to hyperkalemia  -Continue DAPT   -Continue Ranexa   -Continue Statin   -Metoprolol  -TTE reviewed  -s/p RHC and Cardiomems placement    #Foot pain:   - Intial xrays done shows no fracture,- CT scan is negative for fracture  -But, right foot pain and swelling is worsening  -Repeat Xray concerning for evulsion fracture  -US concerning for abscess but Podiatry recommended most likely hematoma and recommended conservative management  -Podiatry consulted  -Pain control with Ice pack , Oxycodone, ace wrap  -Re-applied ACE compression and an ice pack over the right foot, keep the RLE elevated,Patient may WBAT to the right heel       #Hyperkalemia   5.5 (non hemolyzed today)  Lokelma 10 mg daily  held Aldactone to 12.5 mg daily   Nephrologist Dr. Montgomery consulted  Potassium stable    #BRADY on CKD stage 3b   Hyperkalemia at presentation, Resolved  Based on prior renla inces, Cr Cl more or ess at baseline  Permissive Azotemia with diuretics acceptable for cardiac optimization   Avoid Nephrotoxins  Hold aldectone and ARNi due to hyperkalemia  [place] nephrology consulted    #Anemia ( Iron deficiency)  Stable   completed IV Venofer 200 mg  monitor Hgb  Outpt GI colonoscopy or screening   Hb is ~10    #CAD s/p PCI,   Mobitz Type 2 s/p MicraPPM  DAPT, Statin, BB, Nitrate and antianginal    #Recurring UTIs  Per son, h/o recurrent UTIs  Now with ESBL E.Coli. No oral options  Received Meropenem for 3 days--> 2 days of ertapenem     #Diabetes Mellitus type2 on insulin with neuropathy   A1c 7.1  Improving   Hold oral hypoglycemic agents  Blood glucose monitoring with sliding scale  Glargine 26U  Lispro 5U premeal tid  Lispro s/s   Gabapentin 300mg qHS      UTI: she has burning urination, UA suggestive of UTI, has been started on Ceftriaxone, will get urine cultures.      VTE Prophylaxis: SCDs/SQH [id number]    DISPO: Possible discharge to [facility] in 1-2 days

## 2024-12-17 NOTE — PROGRESS NOTE ADULT - ASSESSMENT
BRADY, prerenal due to diuretics, decompensated CHF  Ischemic CM  Not likely CKD >>> Screat 0.90 July, 2024  Hyperkalemia, hyponatremia improved  - avoid potential nephrotoxins; Entresto and Aldactone  - cont diuretics and accept azotemia  - low NaCl, fluid and K+ diet  - follow labs

## 2024-12-17 NOTE — PROGRESS NOTE ADULT - SUBJECTIVE AND OBJECTIVE BOX
NEPHROLOGY INTERVAL HPI/OVERNIGHT EVENTS:  pt clinically stable overnight  no acute distress appreciated  son at bedside    MEDICATIONS  (STANDING):  acetaminophen     Tablet .. 650 milliGRAM(s) Oral every 6 hours  amLODIPine   Tablet 10 milliGRAM(s) Oral daily  aspirin enteric coated 81 milliGRAM(s) Oral daily  atorvastatin 80 milliGRAM(s) Oral at bedtime  clopidogrel Tablet 75 milliGRAM(s) Oral daily  dextrose 5%. 1000 milliLiter(s) (50 mL/Hr) IV Continuous <Continuous>  dextrose 5%. 1000 milliLiter(s) (100 mL/Hr) IV Continuous <Continuous>  dextrose 50% Injectable 25 Gram(s) IV Push once  dextrose 50% Injectable 12.5 Gram(s) IV Push once  dextrose 50% Injectable 25 Gram(s) IV Push once  furosemide    Tablet 40 milliGRAM(s) Oral daily  gabapentin 300 milliGRAM(s) Oral at bedtime  glucagon  Injectable 1 milliGRAM(s) IntraMuscular once  insulin glargine Injectable (LANTUS) 26 Unit(s) SubCutaneous at bedtime  insulin lispro (ADMELOG) corrective regimen sliding scale   SubCutaneous Before meals and at bedtime  insulin lispro Injectable (ADMELOG) 5 Unit(s) SubCutaneous three times a day before meals  isosorbide   mononitrate ER Tablet (IMDUR) 60 milliGRAM(s) Oral daily  metoprolol succinate  milliGRAM(s) Oral daily  ranolazine 500 milliGRAM(s) Oral two times a day  sodium zirconium cyclosilicate 10 Gram(s) Oral daily    MEDICATIONS  (PRN):  acetaminophen     Tablet .. 650 milliGRAM(s) Oral every 6 hours PRN Temp greater or equal to 38C (100.4F), Mild Pain (1 - 3)  aluminum hydroxide/magnesium hydroxide/simethicone Suspension 30 milliLiter(s) Oral every 4 hours PRN Dyspepsia  dextrose Oral Gel 15 Gram(s) Oral once PRN Blood Glucose LESS THAN 70 milliGRAM(s)/deciliter  melatonin 3 milliGRAM(s) Oral at bedtime PRN Insomnia  ondansetron Injectable 4 milliGRAM(s) IV Push every 8 hours PRN Nausea and/or Vomiting  oxyCODONE    IR 5 milliGRAM(s) Oral every 6 hours PRN Severe Pain (7 - 10)      Allergies    No Known Allergies      Vital Signs Last 24 Hrs  T(C): 36.4 (17 Dec 2024 08:01), Max: 36.5 (17 Dec 2024 05:08)  T(F): 97.5 (17 Dec 2024 08:01), Max: 97.7 (17 Dec 2024 05:08)  HR: 69 (17 Dec 2024 08:01) (65 - 71)  BP: 121/72 (17 Dec 2024 08:01) (112/69 - 127/70)  BP(mean): 86 (16 Dec 2024 12:30) (86 - 86)  RR: 18 (17 Dec 2024 08:01) (18 - 18)  SpO2: 97% (17 Dec 2024 08:01) (95% - 98%)    Parameters below as of 17 Dec 2024 08:01  Patient On (Oxygen Delivery Method): room air        PHYSICAL EXAM:  GENERAL: Comfortable  HEENT: No periorbital edema  NECK: Supple, No JVD  NERVOUS SYSTEM: Awake, alert  CHEST: Clear, diminished BS  HEART:  RRR, No rub  ABDOMEN: Soft,  BS+  EXTREMITIES: Tr LE edema  SKIN: No rash        LABS:                        10.8   7.95  )-----------( 445      ( 17 Dec 2024 05:09 )             32.6     12-17    133[L]  |  98  |  57.9[H]  ----------------------------<  106[H]  4.6   |  21.0[L]  |  1.73[H]    Ca    9.5      17 Dec 2024 05:09  Phos  5.4     12-17  Mg     2.8     12-17    TPro  7.4  /  Alb  3.8  /  TBili  0.3[L]  /  DBili  x   /  AST  13  /  ALT  9   /  AlkPhos  77  12-17      Urinalysis Basic - ( 17 Dec 2024 05:09 )    Color: x / Appearance: x / SG: x / pH: x  Gluc: 106 mg/dL / Ketone: x  / Bili: x / Urobili: x   Blood: x / Protein: x / Nitrite: x   Leuk Esterase: x / RBC: x / WBC x   Sq Epi: x / Non Sq Epi: x / Bacteria: x      Magnesium: 2.8 mg/dL (12-17 @ 05:09)  Phosphorus: 5.4 mg/dL (12-17 @ 05:09)      RADIOLOGY & ADDITIONAL TESTS:

## 2024-12-18 LAB
ANION GAP SERPL CALC-SCNC: 14 MMOL/L — SIGNIFICANT CHANGE UP (ref 5–17)
BUN SERPL-MCNC: 59.3 MG/DL — HIGH (ref 8–20)
CALCIUM SERPL-MCNC: 9.4 MG/DL — SIGNIFICANT CHANGE UP (ref 8.4–10.5)
CHLORIDE SERPL-SCNC: 98 MMOL/L — SIGNIFICANT CHANGE UP (ref 96–108)
CO2 SERPL-SCNC: 23 MMOL/L — SIGNIFICANT CHANGE UP (ref 22–29)
CREAT SERPL-MCNC: 1.75 MG/DL — HIGH (ref 0.5–1.3)
EGFR: 31 ML/MIN/1.73M2 — LOW
GLUCOSE BLDC GLUCOMTR-MCNC: 210 MG/DL — HIGH (ref 70–99)
GLUCOSE BLDC GLUCOMTR-MCNC: 217 MG/DL — HIGH (ref 70–99)
GLUCOSE BLDC GLUCOMTR-MCNC: 231 MG/DL — HIGH (ref 70–99)
GLUCOSE BLDC GLUCOMTR-MCNC: 97 MG/DL — SIGNIFICANT CHANGE UP (ref 70–99)
GLUCOSE SERPL-MCNC: 202 MG/DL — HIGH (ref 70–99)
POTASSIUM SERPL-MCNC: 4.6 MMOL/L — SIGNIFICANT CHANGE UP (ref 3.5–5.3)
POTASSIUM SERPL-SCNC: 4.6 MMOL/L — SIGNIFICANT CHANGE UP (ref 3.5–5.3)
SODIUM SERPL-SCNC: 134 MMOL/L — LOW (ref 135–145)

## 2024-12-18 PROCEDURE — 99233 SBSQ HOSP IP/OBS HIGH 50: CPT

## 2024-12-18 RX ORDER — 0.9 % SODIUM CHLORIDE 0.9 %
1000 INTRAVENOUS SOLUTION INTRAVENOUS
Refills: 0 | Status: DISCONTINUED | OUTPATIENT
Start: 2024-12-18 | End: 2024-12-20

## 2024-12-18 RX ORDER — SENNOSIDES 8.6 MG
2 TABLET ORAL AT BEDTIME
Refills: 0 | Status: DISCONTINUED | OUTPATIENT
Start: 2024-12-18 | End: 2024-12-20

## 2024-12-18 RX ORDER — POLYETHYLENE GLYCOL 3350 17 G/17G
17 POWDER, FOR SOLUTION ORAL DAILY
Refills: 0 | Status: DISCONTINUED | OUTPATIENT
Start: 2024-12-18 | End: 2024-12-20

## 2024-12-18 RX ORDER — HYDROMORPHONE HYDROCHLORIDE 2 MG/1
1 TABLET ORAL EVERY 6 HOURS
Refills: 0 | Status: DISCONTINUED | OUTPATIENT
Start: 2024-12-18 | End: 2024-12-20

## 2024-12-18 RX ADMIN — AMLODIPINE BESYLATE 10 MILLIGRAM(S): 10 TABLET ORAL at 05:39

## 2024-12-18 RX ADMIN — Medication 81 MILLIGRAM(S): at 12:22

## 2024-12-18 RX ADMIN — ACETAMINOPHEN 500MG 650 MILLIGRAM(S): 500 TABLET, COATED ORAL at 13:21

## 2024-12-18 RX ADMIN — Medication 2: at 12:22

## 2024-12-18 RX ADMIN — INSULIN GLARGINE 26 UNIT(S): 100 INJECTION, SOLUTION SUBCUTANEOUS at 21:51

## 2024-12-18 RX ADMIN — ACETAMINOPHEN 500MG 650 MILLIGRAM(S): 500 TABLET, COATED ORAL at 18:26

## 2024-12-18 RX ADMIN — Medication 2 TABLET(S): at 21:50

## 2024-12-18 RX ADMIN — CLOPIDOGREL 75 MILLIGRAM(S): 75 TABLET, FILM COATED ORAL at 12:20

## 2024-12-18 RX ADMIN — Medication 2: at 08:43

## 2024-12-18 RX ADMIN — ACETAMINOPHEN 500MG 650 MILLIGRAM(S): 500 TABLET, COATED ORAL at 17:26

## 2024-12-18 RX ADMIN — POLYETHYLENE GLYCOL 3350 17 GRAM(S): 17 POWDER, FOR SOLUTION ORAL at 12:24

## 2024-12-18 RX ADMIN — Medication 5 UNIT(S): at 12:22

## 2024-12-18 RX ADMIN — Medication 2: at 21:52

## 2024-12-18 RX ADMIN — Medication 5 UNIT(S): at 08:43

## 2024-12-18 RX ADMIN — GABAPENTIN 300 MILLIGRAM(S): 300 CAPSULE ORAL at 21:50

## 2024-12-18 RX ADMIN — Medication 50 MILLILITER(S): at 12:29

## 2024-12-18 RX ADMIN — Medication 80 MILLIGRAM(S): at 21:50

## 2024-12-18 RX ADMIN — RANOLAZINE 500 MILLIGRAM(S): 1000 TABLET, FILM COATED, EXTENDED RELEASE ORAL at 17:26

## 2024-12-18 RX ADMIN — Medication 60 MILLIGRAM(S): at 12:21

## 2024-12-18 RX ADMIN — ACETAMINOPHEN 500MG 650 MILLIGRAM(S): 500 TABLET, COATED ORAL at 05:38

## 2024-12-18 RX ADMIN — FUROSEMIDE 40 MILLIGRAM(S): 40 TABLET ORAL at 05:39

## 2024-12-18 RX ADMIN — METOPROLOL TARTRATE 100 MILLIGRAM(S): 100 TABLET, FILM COATED ORAL at 05:39

## 2024-12-18 RX ADMIN — SODIUM ZIRCONIUM CYCLOSILICATE 10 GRAM(S): 5 POWDER, FOR SUSPENSION ORAL at 12:23

## 2024-12-18 RX ADMIN — ACETAMINOPHEN 500MG 650 MILLIGRAM(S): 500 TABLET, COATED ORAL at 23:21

## 2024-12-18 RX ADMIN — RANOLAZINE 500 MILLIGRAM(S): 1000 TABLET, FILM COATED, EXTENDED RELEASE ORAL at 05:39

## 2024-12-18 RX ADMIN — ACETAMINOPHEN 500MG 650 MILLIGRAM(S): 500 TABLET, COATED ORAL at 06:08

## 2024-12-18 RX ADMIN — ACETAMINOPHEN 500MG 650 MILLIGRAM(S): 500 TABLET, COATED ORAL at 12:21

## 2024-12-18 NOTE — PROGRESS NOTE ADULT - ASSESSMENT
74 F with PMHX CAD s/p PCI, HFrEF, Mobitz II s/p PPM (MD NereydaT), PAD with chronic right LE wound, CVA, DM2, CKD3, HTN presents to [facility] ER c/o shortness of breath/RAMIREZ and worsening LE edema admitted for Acute on Chronic HFrEF Exacerbation and Hyperkalemia    BRADY, prerenal due to diuretics, decompensated CHF  Ischemic CM  Renal function slowly improving?  Serum Cr 1.8--> 1.7--> 1.7  Not likely CKD >>> Screat 0.90 July, 2024  Hyperkalemia, hyponatremia improved  - avoid potential nephrotoxins; Entresto and Aldactone  - cont diuretics and accept azotemia  - low NaCl, fluid and K+ diet    Monitor labs will follow

## 2024-12-18 NOTE — PROGRESS NOTE ADULT - SUBJECTIVE AND OBJECTIVE BOX
[facility] Division of Hospital Medicine  Hillary Padilla MD   I'm reachable on [employer]      Patient is a 74y old  Female who presents with a chief complaint of Acute on Chronic HFrEF Exacerbation (17 Dec 2024 15:25)      SUBJECTIVE / OVERNIGHT EVENTS:   Patient was seen and examined during rounds    REports feeling dizzy and contipated.  RIght foot pain is fairly under controlled.  Gentle IV fluid for 10 hours.   Asked cardiology to check cardiomems    12 points ROS negative except above    MEDICATIONS  (STANDING):  acetaminophen     Tablet .. 650 milliGRAM(s) Oral every 6 hours  amLODIPine   Tablet 10 milliGRAM(s) Oral daily  aspirin enteric coated 81 milliGRAM(s) Oral daily  atorvastatin 80 milliGRAM(s) Oral at bedtime  clopidogrel Tablet 75 milliGRAM(s) Oral daily  dextrose 5%. 1000 milliLiter(s) (50 mL/Hr) IV Continuous <Continuous>  dextrose 5%. 1000 milliLiter(s) (100 mL/Hr) IV Continuous <Continuous>  dextrose 50% Injectable 25 Gram(s) IV Push once  dextrose 50% Injectable 12.5 Gram(s) IV Push once  dextrose 50% Injectable 25 Gram(s) IV Push once  gabapentin 300 milliGRAM(s) Oral at bedtime  glucagon  Injectable 1 milliGRAM(s) IntraMuscular once  insulin glargine Injectable (LANTUS) 26 Unit(s) SubCutaneous at bedtime  insulin lispro (ADMELOG) corrective regimen sliding scale   SubCutaneous Before meals and at bedtime  insulin lispro Injectable (ADMELOG) 5 Unit(s) SubCutaneous three times a day before meals  isosorbide   mononitrate ER Tablet (IMDUR) 60 milliGRAM(s) Oral daily  lactated ringers. 1000 milliLiter(s) (50 mL/Hr) IV Continuous <Continuous>  metoprolol succinate  milliGRAM(s) Oral daily  polyethylene glycol 3350 17 Gram(s) Oral daily  ranolazine 500 milliGRAM(s) Oral two times a day  senna 2 Tablet(s) Oral at bedtime  sodium zirconium cyclosilicate 10 Gram(s) Oral daily    MEDICATIONS  (PRN):  acetaminophen     Tablet .. 650 milliGRAM(s) Oral every 6 hours PRN Temp greater or equal to 38C (100.4F), Mild Pain (1 - 3)  aluminum hydroxide/magnesium hydroxide/simethicone Suspension 30 milliLiter(s) Oral every 4 hours PRN Dyspepsia  dextrose Oral Gel 15 Gram(s) Oral once PRN Blood Glucose LESS THAN 70 milliGRAM(s)/deciliter  HYDROmorphone   Tablet 1 milliGRAM(s) Oral every 6 hours PRN Severe Pain (7 - 10)  melatonin 3 milliGRAM(s) Oral at bedtime PRN Insomnia  ondansetron Injectable 4 milliGRAM(s) IV Push every 8 hours PRN Nausea and/or Vomiting        I&O's Summary    17 Dec 2024 07:01  -  18 Dec 2024 07:00  --------------------------------------------------------  IN: 650 mL / OUT: 900 mL / NET: -250 mL    18 Dec 2024 07:01  -  18 Dec 2024 15:25  --------------------------------------------------------  IN: 632 mL / OUT: 0 mL / NET: 632 mL        PHYSICAL EXAM:  Vital Signs Last 24 Hrs  T(C): 36.8 (18 Dec 2024 08:22), Max: 36.8 (18 Dec 2024 08:22)  T(F): 98.3 (18 Dec 2024 08:22), Max: 98.3 (18 Dec 2024 08:22)  HR: 73 (18 Dec 2024 08:22) (65 - 79)  BP: 112/67 (18 Dec 2024 08:22) (112/67 - 153/75)  BP(mean): 85 (18 Dec 2024 00:21) (83 - 101)  RR: 18 (18 Dec 2024 08:22) (18 - 18)  SpO2: 97% (18 Dec 2024 08:22) (94% - 97%)    Parameters below as of 18 Dec 2024 08:22  Patient On (Oxygen Delivery Method): room air        CONSTITUTIONAL: NAD, Not in acute distress  EYES:  EOMI, conjunctiva and sclera clear  ENMT: , neck supple , non-tender  RESPIRATORY: Normal respiratory effort; lungs are clear to auscultation bilaterally  CARDIOVASCULAR: S1S2 present  ABDOMEN: soft. bowel sound present  MUSCLOSKELETAL: ; no clubbing or cyanosis of digits;   PSYCH: A+O to person, place, and time; affect appropriate  NEUROLOGY: CN, motor and sensory intact   SKIN: No rashes; no palpable lesions, increase skin tugor  Extremity: Right foot covered with dressing      LABS:                        10.8   7.95  )-----------( 445      ( 17 Dec 2024 05:09 )             32.6     12-18    134[L]  |  98  |  59.3[H]  ----------------------------<  202[H]  4.6   |  23.0  |  1.75[H]    Ca    9.4      18 Dec 2024 05:22  Phos  5.4     12-17  Mg     2.8     12-17    TPro  7.4  /  Alb  3.8  /  TBili  0.3[L]  /  DBili  x   /  AST  13  /  ALT  9   /  AlkPhos  77  12-17          Urinalysis Basic - ( 18 Dec 2024 05:22 )    Color: x / Appearance: x / SG: x / pH: x  Gluc: 202 mg/dL / Ketone: x  / Bili: x / Urobili: x   Blood: x / Protein: x / Nitrite: x   Leuk Esterase: x / RBC: x / WBC x   Sq Epi: x / Non Sq Epi: x / Bacteria: x        CAPILLARY BLOOD GLUCOSE      POCT Blood Glucose.: 210 mg/dL (18 Dec 2024 12:18)  POCT Blood Glucose.: 217 mg/dL (18 Dec 2024 08:16)  POCT Blood Glucose.: 209 mg/dL (17 Dec 2024 22:23)  POCT Blood Glucose.: 121 mg/dL (17 Dec 2024 16:37)      Labs reviewed:    RADIOLOGY & ADDITIONAL TESTS:  Imaging Personally Reviewed:  Electrocardiogram Personally Reviewed:

## 2024-12-18 NOTE — PROGRESS NOTE ADULT - SUBJECTIVE AND OBJECTIVE BOX
NEPHROLOGY INTERVAL HPI/OVERNIGHT EVENTS:    Examined earlier  No distress frail lethargic  Family visiting    MEDICATIONS  (STANDING):  acetaminophen     Tablet .. 650 milliGRAM(s) Oral every 6 hours  amLODIPine   Tablet 10 milliGRAM(s) Oral daily  aspirin enteric coated 81 milliGRAM(s) Oral daily  atorvastatin 80 milliGRAM(s) Oral at bedtime  clopidogrel Tablet 75 milliGRAM(s) Oral daily  dextrose 5%. 1000 milliLiter(s) (50 mL/Hr) IV Continuous <Continuous>  dextrose 5%. 1000 milliLiter(s) (100 mL/Hr) IV Continuous <Continuous>  dextrose 50% Injectable 25 Gram(s) IV Push once  dextrose 50% Injectable 12.5 Gram(s) IV Push once  dextrose 50% Injectable 25 Gram(s) IV Push once  gabapentin 300 milliGRAM(s) Oral at bedtime  glucagon  Injectable 1 milliGRAM(s) IntraMuscular once  insulin glargine Injectable (LANTUS) 26 Unit(s) SubCutaneous at bedtime  insulin lispro (ADMELOG) corrective regimen sliding scale   SubCutaneous Before meals and at bedtime  insulin lispro Injectable (ADMELOG) 5 Unit(s) SubCutaneous three times a day before meals  isosorbide   mononitrate ER Tablet (IMDUR) 60 milliGRAM(s) Oral daily  lactated ringers. 1000 milliLiter(s) (50 mL/Hr) IV Continuous <Continuous>  metoprolol succinate  milliGRAM(s) Oral daily  polyethylene glycol 3350 17 Gram(s) Oral daily  ranolazine 500 milliGRAM(s) Oral two times a day  senna 2 Tablet(s) Oral at bedtime  sodium zirconium cyclosilicate 10 Gram(s) Oral daily    MEDICATIONS  (PRN):  acetaminophen     Tablet .. 650 milliGRAM(s) Oral every 6 hours PRN Temp greater or equal to 38C (100.4F), Mild Pain (1 - 3)  aluminum hydroxide/magnesium hydroxide/simethicone Suspension 30 milliLiter(s) Oral every 4 hours PRN Dyspepsia  dextrose Oral Gel 15 Gram(s) Oral once PRN Blood Glucose LESS THAN 70 milliGRAM(s)/deciliter  HYDROmorphone   Tablet 1 milliGRAM(s) Oral every 6 hours PRN Severe Pain (7 - 10)  melatonin 3 milliGRAM(s) Oral at bedtime PRN Insomnia  ondansetron Injectable 4 milliGRAM(s) IV Push every 8 hours PRN Nausea and/or Vomiting      Allergies    No Known Allergies    Intolerances        Vital Signs Last 24 Hrs  T(C): 36.8 (18 Dec 2024 08:22), Max: 36.8 (18 Dec 2024 08:22)  T(F): 98.3 (18 Dec 2024 08:22), Max: 98.3 (18 Dec 2024 08:22)  HR: 73 (18 Dec 2024 08:22) (65 - 79)  BP: 112/67 (18 Dec 2024 08:22) (112/67 - 153/75)  BP(mean): 85 (18 Dec 2024 00:21) (83 - 101)  RR: 18 (18 Dec 2024 08:22) (18 - 18)  SpO2: 97% (18 Dec 2024 08:22) (94% - 97%)    Parameters below as of 18 Dec 2024 08:22  Patient On (Oxygen Delivery Method): room air      Daily     Daily Weight in k.1 (18 Dec 2024 04:38)    PHYSICAL EXAM:  GENERAL: Comfortable, NAD  HEENT: No periorbital edema  NECK: Supple, No JVD  NERVOUS SYSTEM: Awake, alert  CHEST: Clear, diminished BS  HEART:  RRR, No rub  ABDOMEN: Soft,  BS+  EXTREMITIES: Tr LE edema    LABS:                        10.8   7.95  )-----------( 445      ( 17 Dec 2024 05:09 )             32.6     12-18    134[L]  |  98  |  59.3[H]  ----------------------------<  202[H]  4.6   |  23.0  |  1.75[H]    Ca    9.4      18 Dec 2024 05:22  Phos  5.4     12-  Mg     2.8     -    TPro  7.4  /  Alb  3.8  /  TBili  0.3[L]  /  DBili  x   /  AST  13  /  ALT  9   /  AlkPhos  77  12-17      Urinalysis Basic - ( 18 Dec 2024 05:22 )    Color: x / Appearance: x / SG: x / pH: x  Gluc: 202 mg/dL / Ketone: x  / Bili: x / Urobili: x   Blood: x / Protein: x / Nitrite: x   Leuk Esterase: x / RBC: x / WBC x   Sq Epi: x / Non Sq Epi: x / Bacteria: x              RADIOLOGY & ADDITIONAL TESTS:

## 2024-12-18 NOTE — CHART NOTE - NSCHARTNOTEFT_GEN_A_CORE
Cardiomems read - PA Diastolic is 9, pt is dry. Overdiuresed from IV lasix. hold diuresis today and tomorrow, resume lasix 40mg PO daily starting 12/20/24 AM. We will sign off

## 2024-12-18 NOTE — PROGRESS NOTE ADULT - ASSESSMENT
A:  Right foot contusion   Possible right 3rd digit chip fracture    P:   Patient evaluated, chart reviewed and updated  Xray and CT reviewed from ED admission - no acute fractures noted   Repeat xray from 12/15 - questionable avulsion fx on the base of the 3rd digit proximal phalanx - no surgical intervention warranted  Ordered ANGEL/PVR to further assess vascularity of the RLE as pulses were very faintly palpable bilaterally  Discussed diagnosis and treatment plans with the patient  Contusion without any appreciable hematoma - edema and ecchymosis seems to be significantly improved at this time. No open lesions or signs of infection. No fluctuant tissues.   Re-applied ACE compression and an ice pack over the right foot  Please continue to ice the right foot and keep the RLE elevated  Patient may WBAT to the right heel   All questions and concerns addressed to the patient's satisfaction. Patient demonstrated verbal understanding.   Patient is stable from podiatry's standpoint. No surgical intervention or further examination are necessary at this time.   Upon discharge, patient can follow up outpatient with Dr. Yoel Ramirez at [address] [place] [address]. Please call [phone] to make an appointment within 1 week.   Discussed with attending Dr. Ramirez

## 2024-12-18 NOTE — PROGRESS NOTE ADULT - SUBJECTIVE AND OBJECTIVE BOX
Podiatry Interval: Patient was seen bedside resting comfortably. No acute overnight events noted. Patient states she is still having pain around the contusion site, but overall improved from when she was first admitted. Patient denies any open lesions. Patient denies any numbness, tingling, or burning. Patient denies calf tenderness bilaterally. She has been compliant with keeping her ACE wrap clean, dry, and intact. She has also been remaining NWB to the E at all times. Patient denies any other pedal complaints and no constitutional symptoms such as F/C/N/V/SOB. AAOx3, NAD.    Patient is a 74y old  Female who presents with a chief complaint of Acute on Chronic HFrEF Exacerbation (14 Dec 2024 16:08)    Podiatry HPI: 74F with PMHX CAD s/p PCI, HFrEF, Mobitz II s/p PPM (MIRNA Dawn), PAD with chronic right LE wound, CVA, DM2, CKD3, HTN presents to [facility] ER c/o shortness of breath/RAMIREZ and worsening LE edema. ROS +nonradiating CP and nonproductive cough. Patient states while in the ED an oxygen tank fell on her foot and she has had bruising and swelling since the incident happened. Patient states as of the last 2 days it has become more painful to walk and pain to touch. No open wounds noted     HPI: Pt seen/examined prior to midnight on 12/3/24. 74F with PMHX CAD s/p PCI, HFrEF, Mobitz II s/p PPM (MIRNA Dawn), PAD with chronic right LE wound, CVA, DM2, CKD3, HTN presents to [facility] ER c/o shortness of breath/RAMIREZ and worsening LE edema. ROS +nonradiating CP and nonproductive cough. Pt recently admitted for decompensated CHF and was doing well until she noted increased weight gain and called her Cardiologist Dr Allen who advised her to come to ER for further evaluation. Pt normally takes Lasix 20mg daily but took 40mg today PTA with improvement. CXR with congestive findings and mild effusions bilaterally. Crackles and pitting edema on exam. Labs notable for leukocytosis, CKD, and hyperkalemia.       Medications:  acetaminophen     Tablet .. 650 milliGRAM(s) Oral every 6 hours  acetaminophen     Tablet .. 650 milliGRAM(s) Oral every 6 hours PRN  aluminum hydroxide/magnesium hydroxide/simethicone Suspension 30 milliLiter(s) Oral every 4 hours PRN  amLODIPine   Tablet 10 milliGRAM(s) Oral daily  aspirin enteric coated 81 milliGRAM(s) Oral daily  atorvastatin 80 milliGRAM(s) Oral at bedtime  clopidogrel Tablet 75 milliGRAM(s) Oral daily  dextrose 5%. 1000 milliLiter(s) IV Continuous <Continuous>  dextrose 5%. 1000 milliLiter(s) IV Continuous <Continuous>  dextrose 50% Injectable 25 Gram(s) IV Push once  dextrose 50% Injectable 12.5 Gram(s) IV Push once  dextrose 50% Injectable 25 Gram(s) IV Push once  dextrose Oral Gel 15 Gram(s) Oral once PRN  furosemide    Tablet 40 milliGRAM(s) Oral daily  gabapentin 300 milliGRAM(s) Oral at bedtime  glucagon  Injectable 1 milliGRAM(s) IntraMuscular once  insulin glargine Injectable (LANTUS) 26 Unit(s) SubCutaneous at bedtime  insulin lispro (ADMELOG) corrective regimen sliding scale   SubCutaneous Before meals and at bedtime  insulin lispro Injectable (ADMELOG) 5 Unit(s) SubCutaneous three times a day before meals  isosorbide   mononitrate ER Tablet (IMDUR) 60 milliGRAM(s) Oral daily  melatonin 3 milliGRAM(s) Oral at bedtime PRN  metoprolol succinate  milliGRAM(s) Oral daily  ondansetron Injectable 4 milliGRAM(s) IV Push every 8 hours PRN  oxyCODONE    IR 5 milliGRAM(s) Oral every 6 hours PRN  ranolazine 500 milliGRAM(s) Oral two times a day  sodium zirconium cyclosilicate 10 Gram(s) Oral daily    FHx:  Family history of diabetes mellitus  FH: asthma    PMHx:   DM (diabetes mellitus)  HTN (hypertension)  Acute otitis media, unspecified otitis media type  H/O gastroesophageal reflux (GERD)  Cardiac pacemaker  CVA (cerebrovascular accident)  PAD (peripheral artery disease)  Wound of right leg    PSHx:  History of benign brain tumor  Cataract  History of biopsy  Cardiac pacemaker  S/P angiogram of extremity      Labs                          10.8   7.95  )-----------( 445      ( 17 Dec 2024 05:09 )             32.6      12-18    134[L]  |  98  |  59.3[H]  ----------------------------<  202[H]  4.6   |  23.0  |  1.75[H]    Ca    9.4      18 Dec 2024 05:22  Phos  5.4     12-17  Mg     2.8     12-17    TPro  7.4  /  Alb  3.8  /  TBili  0.3[L]  /  DBili  x   /  AST  13  /  ALT  9   /  AlkPhos  77  12-17     Vital Signs Last 24 Hrs  T(C): 36.8 (18 Dec 2024 08:22), Max: 36.8 (18 Dec 2024 08:22)  T(F): 98.3 (18 Dec 2024 08:22), Max: 98.3 (18 Dec 2024 08:22)  HR: 73 (18 Dec 2024 08:22) (65 - 79)  BP: 112/67 (18 Dec 2024 08:22) (112/67 - 153/75)  BP(mean): 85 (18 Dec 2024 00:21) (85 - 101)  RR: 18 (18 Dec 2024 08:22) (18 - 18)  SpO2: 97% (18 Dec 2024 08:22) (94% - 97%)    Parameters below as of 18 Dec 2024 08:22  Patient On (Oxygen Delivery Method): room air    Sedimentation Rate, Erythrocyte: 63 mm/hr (10-10-24 @ 05:16)  Sedimentation Rate, Erythrocyte: 72 mm/hr (10-09-24 @ 06:00)         C-Reactive Protein: 26 mg/L (10-09-24 @ 04:43)       LE FOCUSED PHYSICAL EXAM:     Vasc: DP/PT 1/4 bilaterally. CFT <3 seconds x 10. Temp Gradient within normal limits bilaterally, with mild increase in warmth over the right foot. Moderate non-pitting edema without erythema localized to the contusion site, but significantly improved since admission, skin lines returned. No varicosities observed bilaterally.    Derm: Skin appears supple, well-hydrated, and intact bilaterally. R dorsal forefoot to midfoot ecchymosis and non-pitting edema noted to the 2-4th digits. No appreciable hematoma is noted. Small 1.0 x 1.0 patch of darkened tissue over the contusion site. No open lesions, no ulcerations, no skin tenting, no skin blanching, no fracture blisters, no purulence, no drainage, no soft tissue crepitus, no fluctuance, no IDM, no streaking, no signs of an acute/active infection.   Neuro: AAOx3, NAD. Protective sensation is intact via ipswitch 4/4 bilaterally. No paresthesias bilaterally.  MSK: Muscle strength deferred. Moderate pain upon palpation over the right foot contusion. Moderate pain upon ROM of the right foot pedal joints. No crepitation upon ROM of joints. Negative calf tenderness bilaterally. Compartments are compressible, patient able to wiggle toes without pain out of proportion.       IMAGING:     ACC: [id number] EXAM:  XR FOOT COMP MIN 3 VIEWS RT   ORDERED BY: KERLINE DOCKERY   PROCEDURE DATE:  12/04/2024    INTERPRETATION:  INDICATION: Trauma to the right foot.  TECHNIQUE: 3 views of the right foot were obtained    FINDINGS: No fracture or dislocation is seen. There is mild hallux   valgus. There is a small plantar calcaneal spur. There are vascular   calcifications. There are overlying bandages.    IMPRESSION: No fracture or dislocation visualized in the right foot.    --- End of Report ---      ACC: [id number] EXAM:  XR FOOT COMP MIN 3 VIEWS RT   ORDERED BY: GARTH IBARRA   PROCEDURE DATE:  12/15/2024    INTERPRETATION:  DATE OF STUDY: 12/15/24  COMPARISON: 12/4/24 plain right foot films; 12/10/24 CT scan of right foot  CLINICAL HISTORY:  Right foot pain and swelling    FINDINGS:  3 views of the right foot are submitted.  No fracture or dislocation.  Mild hallux valgus.  Small plantar calcaneal spur.  No bony erosive or destructive lesions. No tracking of soft tissue air.  No vascular calcifications.    IMPRESSION:  No discrete osteomyelitic erosions.    --- End of Report ---      ACC: [id number] EXAM:  CT FOOT ONLY RT   ORDERED BY: DARLENE RUIZ   PROCEDURE DATE:  12/10/2024    INTERPRETATION:  CT OF THE RIGHT FOOT  CLINICAL INFORMATION: Right foot pain redness and swelling.  TECHNIQUE: Multidetector CT of the right foot. The study was performed   without the use of intravenous or intra-articular contrast. Multiplanar   reformats were generated for review.    COMPARISON: Right foot radiographs 12/4/2024 and 3/2/2024.    FINDINGS:    BONE: No acute fracture. No cortical destruction or periosteal new bone   formation.    JOINTS: No dislocation. Cartilage spaces are maintained.    SOFT TISSUE: Soft tissue swelling over the dorsum of the foot at the   level of the metatarsals. No focal drainable fluid collection.Mild   atrophy of the intrinsic muscles of the foot. Normal CT appearance of the   imaged tendons. Vascular calcifications. No tracking subcutaneous   emphysema.    IMPRESSION:  1.  Edema in the subcutaneous fat along the dorsum of the foot. No   drainable fluid collection is identified.  2.  No CT evidence of advanced osteomyelitis.    --- End of Report ---      ACC: [id number] EXAM:  US NONVASC EXT LTD RT   ORDERED BY: GARTH IBARRA   PROCEDURE DATE:  12/15/2024    INTERPRETATION:  CLINICAL INFORMATION: Swelling over the right foot.  COMPARISON: Right foot CT dated 12/10/2024  TECHNIQUE:  Ultrasound the right foot is performed.    IMPRESSION:    There is 5.3 x 3.1 x 0.8 cm complex subcutaneous fluid collection along   the dorsal aspect of the right fourth. The collections contain diffuse   internal fine echoes. No internal vascularity. This may represent an   abscess.    --- End of Report ---

## 2024-12-18 NOTE — PROGRESS NOTE ADULT - ASSESSMENT
74 F with PMHX CAD s/p PCI, HFrEF, Mobitz II s/p PPM (MIRNA Dawn), PAD with chronic right LE wound, CVA, DM2, CKD3, HTN presents to [facility] ER c/o shortness of breath/RAMIREZ and worsening LE edema admitted for Acute on Chronic HFrEF Exacerbation and Hyperkalemia. Now s;/p diuresis and cardiomems placement with improvement. Found to have ESBL E.Coli UTI .    #Acute Hypoxic Respiratory failure secondary to   #Acute on Chronic CHF wirh reduced EF 2/2 isch CM s/p PCIs with MVD / CAD  -Resolved, comfortable on RA now   -Furosemide on hold as looks more dehydrated and Creatinine trending up  -spironolactone and Losartan on hold due to hyperkalemia  -Continue DAPT   -Continue Ranexa   -Continue Statin   -Metoprolol  -TTE reviewed  -s/p RHC and Cardiomems placement    #Foot pain:   - Intial xrays done shows no fracture,- CT scan is negative for fracture  -But, right foot pain and swelling is worsening  -Repeat Xray concerning for evulsion fracture  -US concerning for abscess but Podiatry recommended most likely hematoma and recommended conservative management  -Podiatry consulted  -Pain control with Ice pack , dilaudid, ace wrap  -Re-applied ACE compression and an ice pack over the right foot, keep the RLE elevated,Patient may WBAT to the right heel       #Hyperkalemia   5.5 (non hemolyzed today)  Lokelma 10 mg daily  held Aldactone to 12.5 mg daily   Nephrologist Dr. Montgomery consulted  Potassium stable    #BRADY on CKD stage 3b   Hyperkalemia at presentation, Resolved  Based on prior renla inces, Cr Cl more or ess at baseline  Permissive Azotemia with diuretics acceptable for cardiac optimization   Avoid Nephrotoxins  Hold aldectone and ARNi due to hyperkalemia  [facility] consulted    #Anemia ( Iron deficiency)  Stable   completed IV Venofer 200 mg  monitor Hgb  Outpt GI colonoscopy or screening   Hb is ~10    #CAD s/p PCI,   Mobitz Type 2 s/p MicraPPM  DAPT, Statin, BB, Nitrate and antianginal    #Recurring UTIs  Per son, h/o recurrent UTIs  Now with ESBL E.Coli. No oral options  Received Meropenem for 3 days--> 2 days of ertapenem     #Diabetes Mellitus type2 on insulin with neuropathy   A1c 7.1  Improving   Hold oral hypoglycemic agents  Blood glucose monitoring with sliding scale  Glargine 26U  Lispro 5U premeal tid  Lispro s/s   Gabapentin 300mg qHS      UTI: she has burning urination, UA suggestive of UTI, has been started on Ceftriaxone, will get urine cultures.      VTE Prophylaxis: SCDs/SQH 5000q8    DISPO: Possible discharge to [facility] in 1-2 days after final podiatry recommendation and cardiology recommendation.

## 2024-12-18 NOTE — ADVANCED PRACTICE NURSE CONSULT - RECOMMEDATIONS
R foot avulsion fx, being managd by pods. No open lesion at this time, will defer to this team moving forward  continue elevation, ice, and offload when applicable.     GENERAL GUIDELINES, AND RECOMMENDATIONS  ==============================  -Carefully check that no tubes are entangled with the bed linens or have contact to patient’s skin. Keep bed sheets smooth and wrinkle free to prevent injury to the skin   - Assure to position pt feet at 20 degrees, then HOB at a 30 degree angle  to limit sliding/friction/shearing, especially in bed bound populations.         Turn and position the patient Q2 hours. Use wedges when turning and positioning to the left/right, notably if patient can only assist minimally.    Place wedges / moldable pillows above the waist for effective sacral offloading. Keep shoulders and hips properly aligned for greater comfort. Place pillow between legs to support bony prominences and reduce friction/rubbing.    Apply waffle/sacral cushion for sacral offloading, both in bed and in chair. if limited mobility, maintain full fowlers chair seated position for <2 hours at a time.     Keep the pt’s heels protected and elevated off the surface of the bed. Place what the patient can tolerate: pillows, moldable pillows, offloading heel boots      This was a *15  minute visit, with greater than 50% counseling. My findings and suggestions that may benefit the pt were disscussed with the family/caregiver if present at bedside and with pt permission,  shift RN at bedside, in addition to overseeing team/provider via chat and or telephone.     Wound Care will continue to follow the patient? : NO    Continue to monitor skin integrity as per policy,  and call or re-consult Wound Care with any acute changes or lack of progression of current recommendations. Wound care rceommendations are generally for shift RN dressing changes, unless otherwise specified. Wound Care IS NOT continuing to follow the patient unless otherwise specified as noted above.     Tito CLARKN, RN, CWCN  Wound Ostomy Nurse Educator   [facility]  Please call/message over Teams and/or Email for clarification/contact.  (E): [email or web address]

## 2024-12-18 NOTE — ADVANCED PRACTICE NURSE CONSULT - ASSESSMENT
HELDER SCORE  Last helder score is : ---20  19+: pt is NOT at risk for developing pressure injuries  15-18: pt is AT RISK for developing pressure injuries   13-14: pt is AT MODERATE RISK for developing pressure injuries   10-12: pt is AT HIGH RISK for developing pressure injuries   6-9: pt is AT VERY HIGH RISK for developing pressure injuries     PHYSICAL EXAM  Is pt AOx?: x4 in nad resting in bed   Bed bound?: no  Incontinent?: no  Mattress/ bed active?: low airloss   Can pt assist with turn or dependent?: yes  Active pressure injury prevention measures? : low airloss surface, elevation of RLE      SKIN CHECK  - dressing in place ACE wrap over the R foot, unwrapped revealing hyperpigmentation/darkened/eccymotic tissue over the dorsal foot, no skin breka or open lesion. being managed by podiatry. measures ~ 1.5x3cm   diminished pulses, diff to palpte sec to slight pedal edema. 1+

## 2024-12-18 NOTE — ADVANCED PRACTICE NURSE CONSULT - REASON FOR CONSULT
Wound Care was consulted for evaluation of the following:  -R foot wound    Pt seen at bedside today:  - spekaing predominatly Wallisian, but noted that she had moderate pain to the R foot on exaggerated movement and palpation, was hit by O2 tank.

## 2024-12-19 LAB
ANION GAP SERPL CALC-SCNC: 13 MMOL/L — SIGNIFICANT CHANGE UP (ref 5–17)
BUN SERPL-MCNC: 55.1 MG/DL — HIGH (ref 8–20)
CALCIUM SERPL-MCNC: 9.2 MG/DL — SIGNIFICANT CHANGE UP (ref 8.4–10.5)
CHLORIDE SERPL-SCNC: 102 MMOL/L — SIGNIFICANT CHANGE UP (ref 96–108)
CO2 SERPL-SCNC: 21 MMOL/L — LOW (ref 22–29)
CREAT SERPL-MCNC: 1.57 MG/DL — HIGH (ref 0.5–1.3)
EGFR: 35 ML/MIN/1.73M2 — LOW
GLUCOSE BLDC GLUCOMTR-MCNC: 101 MG/DL — HIGH (ref 70–99)
GLUCOSE BLDC GLUCOMTR-MCNC: 132 MG/DL — HIGH (ref 70–99)
GLUCOSE BLDC GLUCOMTR-MCNC: 154 MG/DL — HIGH (ref 70–99)
GLUCOSE BLDC GLUCOMTR-MCNC: 167 MG/DL — HIGH (ref 70–99)
GLUCOSE SERPL-MCNC: 116 MG/DL — HIGH (ref 70–99)
POTASSIUM SERPL-MCNC: 4.4 MMOL/L — SIGNIFICANT CHANGE UP (ref 3.5–5.3)
POTASSIUM SERPL-SCNC: 4.4 MMOL/L — SIGNIFICANT CHANGE UP (ref 3.5–5.3)
SODIUM SERPL-SCNC: 136 MMOL/L — SIGNIFICANT CHANGE UP (ref 135–145)

## 2024-12-19 PROCEDURE — 99232 SBSQ HOSP IP/OBS MODERATE 35: CPT

## 2024-12-19 RX ADMIN — Medication 5 UNIT(S): at 09:04

## 2024-12-19 RX ADMIN — AMLODIPINE BESYLATE 10 MILLIGRAM(S): 10 TABLET ORAL at 05:32

## 2024-12-19 RX ADMIN — Medication 1: at 12:19

## 2024-12-19 RX ADMIN — ACETAMINOPHEN 500MG 650 MILLIGRAM(S): 500 TABLET, COATED ORAL at 13:40

## 2024-12-19 RX ADMIN — RANOLAZINE 500 MILLIGRAM(S): 1000 TABLET, FILM COATED, EXTENDED RELEASE ORAL at 17:08

## 2024-12-19 RX ADMIN — Medication 60 MILLIGRAM(S): at 12:40

## 2024-12-19 RX ADMIN — GABAPENTIN 300 MILLIGRAM(S): 300 CAPSULE ORAL at 21:52

## 2024-12-19 RX ADMIN — ACETAMINOPHEN 500MG 650 MILLIGRAM(S): 500 TABLET, COATED ORAL at 12:40

## 2024-12-19 RX ADMIN — METOPROLOL TARTRATE 100 MILLIGRAM(S): 100 TABLET, FILM COATED ORAL at 05:31

## 2024-12-19 RX ADMIN — ACETAMINOPHEN 500MG 650 MILLIGRAM(S): 500 TABLET, COATED ORAL at 00:21

## 2024-12-19 RX ADMIN — RANOLAZINE 500 MILLIGRAM(S): 1000 TABLET, FILM COATED, EXTENDED RELEASE ORAL at 05:32

## 2024-12-19 RX ADMIN — ACETAMINOPHEN 500MG 650 MILLIGRAM(S): 500 TABLET, COATED ORAL at 06:32

## 2024-12-19 RX ADMIN — Medication 2 TABLET(S): at 21:52

## 2024-12-19 RX ADMIN — Medication 1: at 21:53

## 2024-12-19 RX ADMIN — POLYETHYLENE GLYCOL 3350 17 GRAM(S): 17 POWDER, FOR SOLUTION ORAL at 12:41

## 2024-12-19 RX ADMIN — Medication 81 MILLIGRAM(S): at 12:40

## 2024-12-19 RX ADMIN — INSULIN GLARGINE 26 UNIT(S): 100 INJECTION, SOLUTION SUBCUTANEOUS at 21:52

## 2024-12-19 RX ADMIN — Medication 80 MILLIGRAM(S): at 21:52

## 2024-12-19 RX ADMIN — ACETAMINOPHEN 500MG 650 MILLIGRAM(S): 500 TABLET, COATED ORAL at 05:32

## 2024-12-19 RX ADMIN — CLOPIDOGREL 75 MILLIGRAM(S): 75 TABLET, FILM COATED ORAL at 12:40

## 2024-12-19 RX ADMIN — Medication 5 UNIT(S): at 12:21

## 2024-12-19 NOTE — PROGRESS NOTE ADULT - ASSESSMENT
74 F with PMHX CAD s/p PCI, HFrEF, Mobitz II s/p PPM (MIRNA Dawn), PAD with chronic right LE wound, CVA, DM2, CKD3, HTN presents to [facility] ER c/o shortness of breath/RAMIREZ and worsening LE edema admitted for Acute on Chronic HFrEF Exacerbation and Hyperkalemia. Now s;/p diuresis and cardiomems placement with improvement. Found to have ESBL E.Coli UTI .    #Acute Hypoxic Respiratory failure secondary to   #Acute on Chronic CHF wirh reduced EF 2/2 isch CM s/p PCIs with MVD / CAD  -Resolved, comfortable on RA now   -Furosemide on hold as looks more dehydrated and Creatinine trending up. Possible restart on 12/20 after cardiomem interrogation  -spironolactone and Losartan on hold due to hyperkalemia  -Continue DAPT   -Continue Ranexa   -Continue Statin   -Metoprolol  -TTE reviewed  -s/p RHC and Cardiomems placement    #Foot pain:   - Intial xrays done shows no fracture,- CT scan is negative for fracture  -But, right foot pain and swelling is worsening  -Repeat Xray concerning for evulsion fracture  -US concerning for abscess but Podiatry recommended most likely hematoma and recommended conservative management  -Podiatry consulted  -Pain control with Ice pack , dilaudid, ace wrap  -Re-applied ACE compression and an ice pack over the right foot, keep the RLE elevated,Patient may WBAT to the right heel       #Hyperkalemia   5.5 (non hemolyzed today)  Lokelma 10 mg daily  held Aldactone to 12.5 mg daily   [facility] nephrology is following  Potassium stable    #BRADY on CKD stage 3b   Hyperkalemia at presentation, Resolved  Based on prior renla inces, Cr Cl more or ess at baseline  Permissive Azotemia with diuretics acceptable for cardiac optimization   Avoid Nephrotoxins  Hold aldectone and ARNi due to hyperkalemia  [place] nephrology consulted    #Anemia ( Iron deficiency)  Stable   completed IV Venofer 200 mg  monitor Hgb  Outpt GI colonoscopy or screening   Hb is ~10    #CAD s/p PCI,   Mobitz Type 2 s/p MicraPPM  DAPT, Statin, BB, Nitrate and antianginal    #Recurring UTIs  Per son, h/o recurrent UTIs  Now with ESBL E.Coli. No oral options  Received Meropenem for 3 days--> 2 days of ertapenem     #Diabetes Mellitus type2 on insulin with neuropathy   A1c 7.1  Improving   Hold oral hypoglycemic agents  Blood glucose monitoring with sliding scale  Glargine 26U  Lispro 5U premeal tid  Lispro s/s   Gabapentin 300mg qHS      UTI: she has burning urination, UA suggestive of UTI, has been started on Ceftriaxone, will get urine cultures.      VTE Prophylaxis: SCDs/SQH 5000q8    DISPO: Possible discharge to [facility] in 1-2 days after repeat PT and insurance authorization.

## 2024-12-19 NOTE — PROGRESS NOTE ADULT - SUBJECTIVE AND OBJECTIVE BOX
Podiatry Interval: Patient was seen bedside resting comfortably accompanied by her son. No acute overnight events noted. Patient states she is still having pain around the contusion site, but overall improved from when she was first admitted. Patient denies any open lesions. Patient denies any numbness, tingling, or burning. Patient denies calf tenderness bilaterally. She has been compliant with keeping her ACE wrap clean, dry, and intact. She has also been remaining NWB to the E at all times. Patient denies any other pedal complaints and no constitutional symptoms such as F/C/N/V/SOB. AAOx3, NAD.    Patient is a 74y old  Female who presents with a chief complaint of Acute on Chronic HFrEF Exacerbation (14 Dec 2024 16:08)    Podiatry HPI: 74F with PMHX CAD s/p PCI, HFrEF, Mobitz II s/p PPM (MIRNA Dawn), PAD with chronic right LE wound, CVA, DM2, CKD3, HTN presents to [facility] ER c/o shortness of breath/RAMIREZ and worsening LE edema. ROS +nonradiating CP and nonproductive cough. Patient states while in the ED an oxygen tank fell on her foot and she has had bruising and swelling since the incident happened. Patient states as of the last 2 days it has become more painful to walk and pain to touch. No open wounds noted     HPI: Pt seen/examined prior to midnight on 12/3/24. 74F with PMHX CAD s/p PCI, HFrEF, Mobitz II s/p PPM (MIRNA Dawn), PAD with chronic right LE wound, CVA, DM2, CKD3, HTN presents to [facility] ER c/o shortness of breath/RAMIREZ and worsening LE edema. ROS +nonradiating CP and nonproductive cough. Pt recently admitted for decompensated CHF and was doing well until she noted increased weight gain and called her Cardiologist Dr Allen who advised her to come to ER for further evaluation. Pt normally takes Lasix 20mg daily but took 40mg today PTA with improvement. CXR with congestive findings and mild effusions bilaterally. Crackles and pitting edema on exam. Labs notable for leukocytosis, CKD, and hyperkalemia.       Medications:  acetaminophen     Tablet .. 650 milliGRAM(s) Oral every 6 hours PRN  aluminum hydroxide/magnesium hydroxide/simethicone Suspension 30 milliLiter(s) Oral every 4 hours PRN  amLODIPine   Tablet 10 milliGRAM(s) Oral daily  aspirin enteric coated 81 milliGRAM(s) Oral daily  atorvastatin 80 milliGRAM(s) Oral at bedtime  clopidogrel Tablet 75 milliGRAM(s) Oral daily  dextrose 5%. 1000 milliLiter(s) IV Continuous <Continuous>  dextrose 5%. 1000 milliLiter(s) IV Continuous <Continuous>  dextrose 50% Injectable 25 Gram(s) IV Push once  dextrose 50% Injectable 12.5 Gram(s) IV Push once  dextrose 50% Injectable 25 Gram(s) IV Push once  dextrose Oral Gel 15 Gram(s) Oral once PRN  gabapentin 300 milliGRAM(s) Oral at bedtime  glucagon  Injectable 1 milliGRAM(s) IntraMuscular once  HYDROmorphone   Tablet 1 milliGRAM(s) Oral every 6 hours PRN  insulin glargine Injectable (LANTUS) 26 Unit(s) SubCutaneous at bedtime  insulin lispro (ADMELOG) corrective regimen sliding scale   SubCutaneous Before meals and at bedtime  insulin lispro Injectable (ADMELOG) 5 Unit(s) SubCutaneous three times a day before meals  isosorbide   mononitrate ER Tablet (IMDUR) 60 milliGRAM(s) Oral daily  lactated ringers. 1000 milliLiter(s) IV Continuous <Continuous>  melatonin 3 milliGRAM(s) Oral at bedtime PRN  metoprolol succinate  milliGRAM(s) Oral daily  ondansetron Injectable 4 milliGRAM(s) IV Push every 8 hours PRN  polyethylene glycol 3350 17 Gram(s) Oral daily  ranolazine 500 milliGRAM(s) Oral two times a day  senna 2 Tablet(s) Oral at bedtime  sodium zirconium cyclosilicate 10 Gram(s) Oral daily    FHx:  Family history of diabetes mellitus  FH: asthma    PMHx:   DM (diabetes mellitus)  HTN (hypertension)  Acute otitis media, unspecified otitis media type  H/O gastroesophageal reflux (GERD)  Cardiac pacemaker  CVA (cerebrovascular accident)  CVA (cerebrovascular accident)  PAD (peripheral artery disease)  Wound of right leg    PSHx:  History of benign brain tumor  Cataract  History of biopsy  Cardiac pacemaker  S/P angiogram of extremity      Labs       12-19    136  |  102  |  55.1[H]  ----------------------------<  116[H]  4.4   |  21.0[L]  |  1.57[H]    Ca    9.2      19 Dec 2024 05:25       Vital Signs Last 24 Hrs  T(C): 36.7 (19 Dec 2024 08:39), Max: 36.8 (18 Dec 2024 20:40)  T(F): 98 (19 Dec 2024 08:39), Max: 98.2 (18 Dec 2024 20:40)  HR: 72 (19 Dec 2024 08:39) (68 - 79)  BP: 160/73 (19 Dec 2024 08:39) (112/62 - 160/73)  BP(mean): 84 (19 Dec 2024 04:56) (79 - 92)  RR: 18 (19 Dec 2024 04:56) (18 - 18)  SpO2: 97% (19 Dec 2024 08:39) (95% - 97%)    Parameters below as of 19 Dec 2024 08:39  Patient On (Oxygen Delivery Method): room air    Sedimentation Rate, Erythrocyte: 63 mm/hr (10-10-24 @ 05:16)  Sedimentation Rate, Erythrocyte: 72 mm/hr (10-09-24 @ 06:00)   C-Reactive Protein: 26 mg/L (10-09-24 @ 04:43)       LE FOCUSED PHYSICAL EXAM:     Vasc: DP/PT 1/4 bilaterally. CFT <3 seconds x 10. Temp Gradient within normal limits bilaterally, with mild increase in warmth over the right foot. Moderate non-pitting edema without erythema localized to the contusion site, but significantly improved since admission, skin lines returned. No varicosities observed bilaterally.    Derm: Skin appears supple, well-hydrated, and intact bilaterally. R dorsal forefoot to midfoot ecchymosis and non-pitting edema noted to the 2-4th digits. No appreciable hematoma is noted. Small 1.0 x 1.0 patch of darkened tissue over the contusion site. No open lesions, no ulcerations, no skin tenting, no skin blanching, no fracture blisters, no purulence, no drainage, no soft tissue crepitus, no fluctuance, no IDM, no streaking, no signs of an acute/active infection.   Neuro: AAOx3, NAD. Protective sensation is intact via ipswitch 4/4 bilaterally. No paresthesias bilaterally.  MSK: Muscle strength deferred. Moderate pain upon palpation over the right foot contusion. Moderate pain upon ROM of the right foot pedal joints. No crepitation upon ROM of joints. Negative calf tenderness bilaterally. Compartments are compressible, patient able to wiggle toes without pain out of proportion.       IMAGING:     ACC: [id number] EXAM:  XR FOOT COMP MIN 3 VIEWS RT   ORDERED BY: KERLINE DOCKERY   PROCEDURE DATE:  12/04/2024    INTERPRETATION:  INDICATION: Trauma to the right foot.  TECHNIQUE: 3 views of the right foot were obtained    FINDINGS: No fracture or dislocation is seen. There is mild hallux   valgus. There is a small plantar calcaneal spur. There are vascular   calcifications. There are overlying bandages.    IMPRESSION: No fracture or dislocation visualized in the right foot.    --- End of Report ---      ACC: [id number] EXAM:  XR FOOT COMP MIN 3 VIEWS RT   ORDERED BY: GARTH IBARRA   PROCEDURE DATE:  12/15/2024    INTERPRETATION:  DATE OF STUDY: 12/15/24  COMPARISON: 12/4/24 plain right foot films; 12/10/24 CT scan of right foot  CLINICAL HISTORY:  Right foot pain and swelling    FINDINGS:  3 views of the right foot are submitted.  No fracture or dislocation.  Mild hallux valgus.  Small plantar calcaneal spur.  No bony erosive or destructive lesions. No tracking of soft tissue air.  No vascular calcifications.    IMPRESSION:  No discrete osteomyelitic erosions.    --- End of Report ---      ACC: [id number] EXAM:  CT FOOT ONLY RT   ORDERED BY: DARLENE RUIZ   PROCEDURE DATE:  12/10/2024    INTERPRETATION:  CT OF THE RIGHT FOOT  CLINICAL INFORMATION: Right foot pain redness and swelling.  TECHNIQUE: Multidetector CT of the right foot. The study was performed   without the use of intravenous or intra-articular contrast. Multiplanar   reformats were generated for review.    COMPARISON: Right foot radiographs 12/4/2024 and 3/2/2024.    FINDINGS:    BONE: No acute fracture. No cortical destruction or periosteal new bone   formation.    JOINTS: No dislocation. Cartilage spaces are maintained.    SOFT TISSUE: Soft tissue swelling over the dorsum of the foot at the   level of the metatarsals. No focal drainable fluid collection.Mild   atrophy of the intrinsic muscles of the foot. Normal CT appearance of the   imaged tendons. Vascular calcifications. No tracking subcutaneous   emphysema.    IMPRESSION:  1.  Edema in the subcutaneous fat along the dorsum of the foot. No   drainable fluid collection is identified.  2.  No CT evidence of advanced osteomyelitis.    --- End of Report ---      ACC: [id number] EXAM:  US NONVASC EXT LTD RT   ORDERED BY: GARTH IBARRA   PROCEDURE DATE:  12/15/2024    INTERPRETATION:  CLINICAL INFORMATION: Swelling over the right foot.  COMPARISON: Right foot CT dated 12/10/2024  TECHNIQUE:  Ultrasound the right foot is performed.    IMPRESSION:    There is 5.3 x 3.1 x 0.8 cm complex subcutaneous fluid collection along   the dorsal aspect of the right fourth. The collections contain diffuse   internal fine echoes. No internal vascularity. This may represent an   abscess.    --- End of Report ---

## 2024-12-19 NOTE — CHART NOTE - NSCHARTNOTEFT_GEN_A_CORE
Source: Patient [ ]  Family [x ]   other [x ]  EMR and staff     Current Diet: Diet, DASH/TLC:   Sodium & Cholesterol Restricted  Consistent Carbohydrate {Evening Snack} (CSTCHOSN)  1500mL Fluid Restriction ([id number]) (12-04-24 @ 01:00)    PO intake:  < 50% [x ]   50-75%  [ ]   %  [ ]  other :    Source for PO intake [ ] Patient [x ] family [ ] chart [ ] staff [ ] other    Current Weight:   12/3: 68.5 kg   (Trace edema R foot)    Pertinent Medications: MEDICATIONS  (STANDING):  amLODIPine   Tablet 10 milliGRAM(s) Oral daily  aspirin enteric coated 81 milliGRAM(s) Oral daily  atorvastatin 80 milliGRAM(s) Oral at bedtime  clopidogrel Tablet 75 milliGRAM(s) Oral daily  dextrose 50% Injectable 25 Gram(s) IV Push once  gabapentin 300 milliGRAM(s) Oral at bedtime  glucagon  Injectable 1 milliGRAM(s) IntraMuscular once   insulin lispro (ADMELOG) corrective regimen sliding scale   SubCutaneous Before meals and at bedtime  isosorbide   mononitrate ER Tablet (IMDUR) 60 milliGRAM(s) Oral daily  lactated ringers. 1000 milliLiter(s) (50 mL/Hr) IV Continuous <Continuous>  metoprolol succinate  milliGRAM(s) Oral daily  polyethylene glycol 3350 17 Gram(s) Oral daily  ranolazine 500 milliGRAM(s) Oral two times a day  senna 2 Tablet(s) Oral at bedtime  sodium zirconium cyclosilicate 10 Gram(s) Oral daily    MEDICATIONS  (PRN):  acetaminophen     Tablet .. 650 milliGRAM(s) Oral every 6 hours PRN Temp greater or equal to 38C (100.4F), Mild Pain (1 - 3)  aluminum hydroxide/magnesium hydroxide/simethicone Suspension 30 milliLiter(s) Oral every 4 hours PRN Dyspepsia  dextrose Oral Gel 15 Gram(s) Oral once PRN Blood Glucose LESS THAN 70 milliGRAM(s)/deciliter  HYDROmorphone   Tablet 1 milliGRAM(s) Oral every 6 hours PRN Severe Pain (7 - 10)  melatonin 3 milliGRAM(s) Oral at bedtime PRN Insomnia  ondansetron Injectable 4 milliGRAM(s) IV Push every 8 hours PRN Nausea and/or Vomiting    Skin: Surgical site R groin     Nutrition focused physical exam conducted - found signs of malnutrition [ ]absent [x ]present    Subcutaneous fat loss: [x ] Orbital fat pads region, [ ]Buccal fat region, [ ]Triceps region,  [ ]Ribs region    Muscle wasting: [ x]Temples region, [ x]Clavicle region, [x ]Shoulder region, [ ]Scapula region, [ ]Interosseous region,  [ ]thigh region, [ ]Calf region    Estimated Needs:   [x ] no change since previous assessment  [ ] recalculated:     Hospital course:   Pt is a 74 year old F with PMHX CAD s/p PCI, HFrEF, Mobitz II s/p PPM (Nereyda, MDT), PAD with chronic right LE wound, CVA, DM2, CKD3, HTN presents to [facility] ER c/o shortness of breath/RAMIREZ and worsening LE edema admitted for Acute on Chronic HFrEF Exacerbation and Hyperkalemia. Now s/p diuresis and cardiomems placement with improvement. Found to have ESBL E.Coli UTI .    Current Nutrition Diagnosis:  Pt remains at high nutrition risk secondary to Moderate (acute) protein calorie malnutrition related to inadequate protein energy intake with decreased appetite in setting of acute on Chronic HFrEF Exacerbation and pain foot pain as evidenced by meeting < 75% estimated energy needs > 7 days, physical signs of mild muscle/fat loss. Spoke to pt's son at bedside; who reports pt has had poor PO intake since admission. Food preferences obtained, HBV protein foods encouraged. Pt agreeable to ONS. Recommendations below:     Recommendations:   1. RX: MVI and Vit. C daily (500mg).   2. Check weight daily to monitor trends   3. Encourage with meals   4. Glucerna shake BID (220kcal, 10gm protein per shake)     Monitoring and Evaluation:   [x ] PO intake [x ] Tolerance to diet prescription [X] Weights  [X] Follow up per protocol [X] Labs:

## 2024-12-19 NOTE — PROGRESS NOTE ADULT - SUBJECTIVE AND OBJECTIVE BOX
[facility] Division of Hospital Medicine  Hillary Padilla MD   I'm reachable on Group Therapy Records Teams      Patient is a 74y old  Female who presents with a chief complaint of Acute on Chronic HFrEF Exacerbation (19 Dec 2024 13:28)      SUBJECTIVE / OVERNIGHT EVENTS:   Patient was seen and examined during rounds  REports feeling much better. Dizziness is better  Cardiomem evaluation recommended to hold Lasix for now      12 points ROS negative except above    MEDICATIONS  (STANDING):  amLODIPine   Tablet 10 milliGRAM(s) Oral daily  aspirin enteric coated 81 milliGRAM(s) Oral daily  atorvastatin 80 milliGRAM(s) Oral at bedtime  clopidogrel Tablet 75 milliGRAM(s) Oral daily  dextrose 5%. 1000 milliLiter(s) (50 mL/Hr) IV Continuous <Continuous>  dextrose 5%. 1000 milliLiter(s) (100 mL/Hr) IV Continuous <Continuous>  dextrose 50% Injectable 25 Gram(s) IV Push once  dextrose 50% Injectable 12.5 Gram(s) IV Push once  dextrose 50% Injectable 25 Gram(s) IV Push once  gabapentin 300 milliGRAM(s) Oral at bedtime  glucagon  Injectable 1 milliGRAM(s) IntraMuscular once  insulin glargine Injectable (LANTUS) 26 Unit(s) SubCutaneous at bedtime  insulin lispro (ADMELOG) corrective regimen sliding scale   SubCutaneous Before meals and at bedtime  insulin lispro Injectable (ADMELOG) 5 Unit(s) SubCutaneous three times a day before meals  isosorbide   mononitrate ER Tablet (IMDUR) 60 milliGRAM(s) Oral daily  lactated ringers. 1000 milliLiter(s) (50 mL/Hr) IV Continuous <Continuous>  metoprolol succinate  milliGRAM(s) Oral daily  polyethylene glycol 3350 17 Gram(s) Oral daily  ranolazine 500 milliGRAM(s) Oral two times a day  senna 2 Tablet(s) Oral at bedtime  sodium zirconium cyclosilicate 10 Gram(s) Oral daily    MEDICATIONS  (PRN):  acetaminophen     Tablet .. 650 milliGRAM(s) Oral every 6 hours PRN Temp greater or equal to 38C (100.4F), Mild Pain (1 - 3)  aluminum hydroxide/magnesium hydroxide/simethicone Suspension 30 milliLiter(s) Oral every 4 hours PRN Dyspepsia  dextrose Oral Gel 15 Gram(s) Oral once PRN Blood Glucose LESS THAN 70 milliGRAM(s)/deciliter  HYDROmorphone   Tablet 1 milliGRAM(s) Oral every 6 hours PRN Severe Pain (7 - 10)  melatonin 3 milliGRAM(s) Oral at bedtime PRN Insomnia  ondansetron Injectable 4 milliGRAM(s) IV Push every 8 hours PRN Nausea and/or Vomiting        I&O's Summary    18 Dec 2024 07:01  -  19 Dec 2024 07:00  --------------------------------------------------------  IN: 1204 mL / OUT: 1550 mL / NET: -346 mL    19 Dec 2024 07:01  -  19 Dec 2024 14:04  --------------------------------------------------------  IN: 222 mL / OUT: 600 mL / NET: -378 mL        PHYSICAL EXAM:  Vital Signs Last 24 Hrs  T(C): 36.7 (19 Dec 2024 08:39), Max: 36.8 (18 Dec 2024 20:40)  T(F): 98 (19 Dec 2024 08:39), Max: 98.2 (18 Dec 2024 20:40)  HR: 72 (19 Dec 2024 08:39) (68 - 79)  BP: 160/73 (19 Dec 2024 08:39) (112/62 - 160/73)  BP(mean): 84 (19 Dec 2024 04:56) (79 - 92)  RR: 18 (19 Dec 2024 04:56) (18 - 18)  SpO2: 97% (19 Dec 2024 08:39) (95% - 97%)    Parameters below as of 19 Dec 2024 08:39  Patient On (Oxygen Delivery Method): room air        CONSTITUTIONAL: NAD, Not in acute distress  EYES:  EOMI, conjunctiva and sclera clear  ENMT: , neck supple , non-tender  RESPIRATORY: Normal respiratory effort; lungs are clear to auscultation bilaterally  CARDIOVASCULAR: S1S2 present  ABDOMEN: soft. bowel sound present  MUSCLOSKELETAL: ; no clubbing or cyanosis of digits;   PSYCH: A+O to person, place, and time; affect appropriate  NEUROLOGY: CN, motor and sensory intact   SKIN: No rashes; no palpable lesions  Extremity: RIght foot covered with dressing      LABS:    12-19    136  |  102  |  55.1[H]  ----------------------------<  116[H]  4.4   |  21.0[L]  |  1.57[H]    Ca    9.2      19 Dec 2024 05:25            Urinalysis Basic - ( 19 Dec 2024 05:25 )    Color: x / Appearance: x / SG: x / pH: x  Gluc: 116 mg/dL / Ketone: x  / Bili: x / Urobili: x   Blood: x / Protein: x / Nitrite: x   Leuk Esterase: x / RBC: x / WBC x   Sq Epi: x / Non Sq Epi: x / Bacteria: x        CAPILLARY BLOOD GLUCOSE      POCT Blood Glucose.: 167 mg/dL (19 Dec 2024 11:20)  POCT Blood Glucose.: 132 mg/dL (19 Dec 2024 08:20)  POCT Blood Glucose.: 231 mg/dL (18 Dec 2024 21:49)  POCT Blood Glucose.: 97 mg/dL (18 Dec 2024 17:16)      Labs reviewed:    RADIOLOGY & ADDITIONAL TESTS:  Imaging Personally Reviewed:  Electrocardiogram Personally Reviewed:

## 2024-12-19 NOTE — PROGRESS NOTE ADULT - SUBJECTIVE AND OBJECTIVE BOX
NEPHROLOGY INTERVAL HPI/OVERNIGHT EVENTS:    Examined earlier  No distress   Feeling better  sitting in chair  Family visiting    MEDICATIONS  (STANDING):  amLODIPine   Tablet 10 milliGRAM(s) Oral daily  aspirin enteric coated 81 milliGRAM(s) Oral daily  atorvastatin 80 milliGRAM(s) Oral at bedtime  clopidogrel Tablet 75 milliGRAM(s) Oral daily  dextrose 5%. 1000 milliLiter(s) (50 mL/Hr) IV Continuous <Continuous>  dextrose 5%. 1000 milliLiter(s) (100 mL/Hr) IV Continuous <Continuous>  dextrose 50% Injectable 25 Gram(s) IV Push once  dextrose 50% Injectable 12.5 Gram(s) IV Push once  dextrose 50% Injectable 25 Gram(s) IV Push once  gabapentin 300 milliGRAM(s) Oral at bedtime  glucagon  Injectable 1 milliGRAM(s) IntraMuscular once  insulin glargine Injectable (LANTUS) 26 Unit(s) SubCutaneous at bedtime  insulin lispro (ADMELOG) corrective regimen sliding scale   SubCutaneous Before meals and at bedtime  insulin lispro Injectable (ADMELOG) 5 Unit(s) SubCutaneous three times a day before meals  isosorbide   mononitrate ER Tablet (IMDUR) 60 milliGRAM(s) Oral daily  lactated ringers. 1000 milliLiter(s) (50 mL/Hr) IV Continuous <Continuous>  metoprolol succinate  milliGRAM(s) Oral daily  polyethylene glycol 3350 17 Gram(s) Oral daily  ranolazine 500 milliGRAM(s) Oral two times a day  senna 2 Tablet(s) Oral at bedtime  sodium zirconium cyclosilicate 10 Gram(s) Oral daily    MEDICATIONS  (PRN):  acetaminophen     Tablet .. 650 milliGRAM(s) Oral every 6 hours PRN Temp greater or equal to 38C (100.4F), Mild Pain (1 - 3)  aluminum hydroxide/magnesium hydroxide/simethicone Suspension 30 milliLiter(s) Oral every 4 hours PRN Dyspepsia  dextrose Oral Gel 15 Gram(s) Oral once PRN Blood Glucose LESS THAN 70 milliGRAM(s)/deciliter  HYDROmorphone   Tablet 1 milliGRAM(s) Oral every 6 hours PRN Severe Pain (7 - 10)  melatonin 3 milliGRAM(s) Oral at bedtime PRN Insomnia  ondansetron Injectable 4 milliGRAM(s) IV Push every 8 hours PRN Nausea and/or Vomiting      Allergies    No Known Allergies    Intolerances        Vital Signs Last 24 Hrs  T(C): 36.7 (19 Dec 2024 08:39), Max: 36.8 (18 Dec 2024 20:40)  T(F): 98 (19 Dec 2024 08:39), Max: 98.2 (18 Dec 2024 20:40)  HR: 72 (19 Dec 2024 08:39) (68 - 79)  BP: 160/73 (19 Dec 2024 08:39) (112/62 - 160/73)  BP(mean): 84 (19 Dec 2024 04:56) (79 - 92)  RR: 18 (19 Dec 2024 04:56) (18 - 18)  SpO2: 97% (19 Dec 2024 08:39) (95% - 97%)    Parameters below as of 19 Dec 2024 08:39  Patient On (Oxygen Delivery Method): room air      Daily     Daily Weight in k.7 (19 Dec 2024 04:56)    PHYSICAL EXAM:  GENERAL: Comfortable, NAD  HEENT: No periorbital edema  NECK: Supple, No JVD  NERVOUS SYSTEM: Awake, alert  CHEST: Clear, diminished BS  HEART:  RRR, No rub  ABDOMEN: Soft,  BS+  EXTREMITIES: Tr LE edema    LABS:    -    136  |  102  |  55.1[H]  ----------------------------<  116[H]  4.4   |  21.0[L]  |  1.57[H]    Ca    9.2      19 Dec 2024 05:25        Urinalysis Basic - ( 19 Dec 2024 05:25 )    Color: x / Appearance: x / SG: x / pH: x  Gluc: 116 mg/dL / Ketone: x  / Bili: x / Urobili: x   Blood: x / Protein: x / Nitrite: x   Leuk Esterase: x / RBC: x / WBC x   Sq Epi: x / Non Sq Epi: x / Bacteria: x              RADIOLOGY & ADDITIONAL TESTS:

## 2024-12-19 NOTE — PROGRESS NOTE ADULT - ASSESSMENT
A:  Right foot contusion   Possible right 3rd digit chip fracture    P:   Patient evaluated, chart reviewed and updated  Xray and CT reviewed from ED admission - no acute fractures noted   Repeat xray from 12/15 - questionable avulsion fx on the base of the 3rd digit proximal phalanx - no surgical intervention warranted  ANGEL/PVR to further assess vascularity of the RLE as pulses were very faintly palpable bilaterally - Pending  Discussed diagnosis and treatment plans with the patient  Contusion without any appreciable hematoma - edema and ecchymosis seems to be significantly improved at this time. No open lesions or signs of infection. No fluctuant tissues.   Re-applied ACE compression and an ice pack over the right foot  Please continue to ice the right foot and keep the RLE elevated  Patient may WBAT to the right heel   All questions and concerns addressed to the patient's satisfaction. Patient demonstrated verbal understanding.   Patient is stable from podiatry's standpoint. No surgical intervention or further examination is necessary at this time.  Recommending [facility] placement considering patient also has other comorbidities. Son states that he would like 24 hour care for her which is hard to do at home since patient is a fall risk with her difficulty weightbearing at this time unless [facility] is able to provide a live-in nurse.  Upon discharge, patient can follow up outpatient with Dr. Yoel Ramirez at [address] [place] [address]. Please call [phone] to make an appointment within 1 week.   Seen, evaluated, and treated with attending Dr. Ramirez

## 2024-12-19 NOTE — PROGRESS NOTE ADULT - ASSESSMENT
74 F with PMHX CAD s/p PCI, HFrEF, Mobitz II s/p PPM (MIRNA Dawn), PAD with chronic right LE wound, CVA, DM2, HTN presents to [facility] ER c/o shortness of breath/RAMIREZ and worsening LE edema admitted for Acute on Chronic HFrEF Exacerbation and Hyperkalemia    BRADY, prerenal due to diuretics, decompensated CHF  Ischemic CM  Renal function slowly improving  Serum Cr 1.8--> 1.7--> 1.7 --> 1.5  Not likely CKD >>> Screat 0.90 July, 2024  Hyperkalemia, hyponatremia improved  - avoid potential nephrotoxins; Entresto and Aldactone  - diuretics on hold  - low NaCl, fluid and K+ diet    Monitor labs will follow

## 2024-12-19 NOTE — PROGRESS NOTE ADULT - NUTRITIONAL ASSESSMENT
This patient has been assessed with a concern for Malnutrition and has been determined to have a diagnosis/diagnoses of Moderate protein-calorie malnutrition.    This patient is being managed with:   Diet DASH/TLC-  Sodium & Cholesterol Restricted  Consistent Carbohydrate {Evening Snack} (CSTCHOSN)  1500mL Fluid Restriction ([id number])  Entered: Dec  4 2024 12:59AM

## 2024-12-19 NOTE — DIETITIAN NUTRITION RISK NOTIFICATION - TREATMENT: THE FOLLOWING DIET HAS BEEN RECOMMENDED
Diet, DASH/TLC:   Sodium & Cholesterol Restricted  Consistent Carbohydrate {Evening Snack} (CSTCHOSN)  1500mL Fluid Restriction ([id number]) (12-04-24 @ 01:00) [Active]

## 2024-12-20 ENCOUNTER — TRANSCRIPTION ENCOUNTER (OUTPATIENT)
Age: 74
End: 2024-12-20

## 2024-12-20 VITALS
SYSTOLIC BLOOD PRESSURE: 143 MMHG | RESPIRATION RATE: 18 BRPM | TEMPERATURE: 98 F | OXYGEN SATURATION: 96 % | DIASTOLIC BLOOD PRESSURE: 79 MMHG | HEART RATE: 66 BPM

## 2024-12-20 LAB
ALBUMIN SERPL ELPH-MCNC: 3.5 G/DL — SIGNIFICANT CHANGE UP (ref 3.3–5.2)
ALP SERPL-CCNC: 92 U/L — SIGNIFICANT CHANGE UP (ref 40–120)
ALT FLD-CCNC: 13 U/L — SIGNIFICANT CHANGE UP
ANION GAP SERPL CALC-SCNC: 12 MMOL/L — SIGNIFICANT CHANGE UP (ref 5–17)
AST SERPL-CCNC: 15 U/L — SIGNIFICANT CHANGE UP
BILIRUB SERPL-MCNC: 0.3 MG/DL — LOW (ref 0.4–2)
BUN SERPL-MCNC: 43.9 MG/DL — HIGH (ref 8–20)
CALCIUM SERPL-MCNC: 9.2 MG/DL — SIGNIFICANT CHANGE UP (ref 8.4–10.5)
CHLORIDE SERPL-SCNC: 104 MMOL/L — SIGNIFICANT CHANGE UP (ref 96–108)
CO2 SERPL-SCNC: 21 MMOL/L — LOW (ref 22–29)
CREAT SERPL-MCNC: 1.3 MG/DL — SIGNIFICANT CHANGE UP (ref 0.5–1.3)
EGFR: 44 ML/MIN/1.73M2 — LOW
GLUCOSE BLDC GLUCOMTR-MCNC: 128 MG/DL — HIGH (ref 70–99)
GLUCOSE BLDC GLUCOMTR-MCNC: 211 MG/DL — HIGH (ref 70–99)
GLUCOSE SERPL-MCNC: 174 MG/DL — HIGH (ref 70–99)
HCT VFR BLD CALC: 32.8 % — LOW (ref 34.5–45)
HGB BLD-MCNC: 10.6 G/DL — LOW (ref 11.5–15.5)
MAGNESIUM SERPL-MCNC: 2.7 MG/DL — HIGH (ref 1.6–2.6)
MCHC RBC-ENTMCNC: 27 PG — SIGNIFICANT CHANGE UP (ref 27–34)
MCHC RBC-ENTMCNC: 32.3 G/DL — SIGNIFICANT CHANGE UP (ref 32–36)
MCV RBC AUTO: 83.5 FL — SIGNIFICANT CHANGE UP (ref 80–100)
PHOSPHATE SERPL-MCNC: 4 MG/DL — SIGNIFICANT CHANGE UP (ref 2.4–4.7)
PLATELET # BLD AUTO: 440 K/UL — HIGH (ref 150–400)
POTASSIUM SERPL-MCNC: 5 MMOL/L — SIGNIFICANT CHANGE UP (ref 3.5–5.3)
POTASSIUM SERPL-SCNC: 5 MMOL/L — SIGNIFICANT CHANGE UP (ref 3.5–5.3)
PROT SERPL-MCNC: 7.1 G/DL — SIGNIFICANT CHANGE UP (ref 6.6–8.7)
RBC # BLD: 3.93 M/UL — SIGNIFICANT CHANGE UP (ref 3.8–5.2)
RBC # FLD: 18.4 % — HIGH (ref 10.3–14.5)
SODIUM SERPL-SCNC: 137 MMOL/L — SIGNIFICANT CHANGE UP (ref 135–145)
WBC # BLD: 7.89 K/UL — SIGNIFICANT CHANGE UP (ref 3.8–10.5)
WBC # FLD AUTO: 7.89 K/UL — SIGNIFICANT CHANGE UP (ref 3.8–10.5)

## 2024-12-20 PROCEDURE — 0241U: CPT

## 2024-12-20 PROCEDURE — 73630 X-RAY EXAM OF FOOT: CPT

## 2024-12-20 PROCEDURE — C1894: CPT

## 2024-12-20 PROCEDURE — 86900 BLOOD TYPING SEROLOGIC ABO: CPT

## 2024-12-20 PROCEDURE — C1769: CPT

## 2024-12-20 PROCEDURE — 93005 ELECTROCARDIOGRAM TRACING: CPT

## 2024-12-20 PROCEDURE — 76882 US LMTD JT/FCL EVL NVASC XTR: CPT

## 2024-12-20 PROCEDURE — C2624: CPT

## 2024-12-20 PROCEDURE — T1013: CPT

## 2024-12-20 PROCEDURE — 93458 L HRT ARTERY/VENTRICLE ANGIO: CPT

## 2024-12-20 PROCEDURE — 97116 GAIT TRAINING THERAPY: CPT

## 2024-12-20 PROCEDURE — 99232 SBSQ HOSP IP/OBS MODERATE 35: CPT

## 2024-12-20 PROCEDURE — 86901 BLOOD TYPING SEROLOGIC RH(D): CPT

## 2024-12-20 PROCEDURE — 82962 GLUCOSE BLOOD TEST: CPT

## 2024-12-20 PROCEDURE — P9016: CPT

## 2024-12-20 PROCEDURE — 73700 CT LOWER EXTREMITY W/O DYE: CPT | Mod: MC

## 2024-12-20 PROCEDURE — 80048 BASIC METABOLIC PNL TOTAL CA: CPT

## 2024-12-20 PROCEDURE — 83550 IRON BINDING TEST: CPT

## 2024-12-20 PROCEDURE — 87086 URINE CULTURE/COLONY COUNT: CPT

## 2024-12-20 PROCEDURE — 96374 THER/PROPH/DIAG INJ IV PUSH: CPT

## 2024-12-20 PROCEDURE — 87637 SARSCOV2&INF A&B&RSV AMP PRB: CPT

## 2024-12-20 PROCEDURE — 99239 HOSP IP/OBS DSCHRG MGMT >30: CPT

## 2024-12-20 PROCEDURE — 36415 COLL VENOUS BLD VENIPUNCTURE: CPT

## 2024-12-20 PROCEDURE — C8929: CPT

## 2024-12-20 PROCEDURE — 99285 EMERGENCY DEPT VISIT HI MDM: CPT | Mod: 25

## 2024-12-20 PROCEDURE — 97163 PT EVAL HIGH COMPLEX 45 MIN: CPT

## 2024-12-20 PROCEDURE — 82746 ASSAY OF FOLIC ACID SERUM: CPT

## 2024-12-20 PROCEDURE — 83735 ASSAY OF MAGNESIUM: CPT

## 2024-12-20 PROCEDURE — 84443 ASSAY THYROID STIM HORMONE: CPT

## 2024-12-20 PROCEDURE — 71045 X-RAY EXAM CHEST 1 VIEW: CPT

## 2024-12-20 PROCEDURE — 84466 ASSAY OF TRANSFERRIN: CPT

## 2024-12-20 PROCEDURE — 36430 TRANSFUSION BLD/BLD COMPNT: CPT

## 2024-12-20 PROCEDURE — 82728 ASSAY OF FERRITIN: CPT

## 2024-12-20 PROCEDURE — 82607 VITAMIN B-12: CPT

## 2024-12-20 PROCEDURE — 84484 ASSAY OF TROPONIN QUANT: CPT

## 2024-12-20 PROCEDURE — 86923 COMPATIBILITY TEST ELECTRIC: CPT

## 2024-12-20 PROCEDURE — 81001 URINALYSIS AUTO W/SCOPE: CPT

## 2024-12-20 PROCEDURE — 86850 RBC ANTIBODY SCREEN: CPT

## 2024-12-20 PROCEDURE — 85018 HEMOGLOBIN: CPT

## 2024-12-20 PROCEDURE — 83540 ASSAY OF IRON: CPT

## 2024-12-20 PROCEDURE — 83880 ASSAY OF NATRIURETIC PEPTIDE: CPT

## 2024-12-20 PROCEDURE — 85014 HEMATOCRIT: CPT

## 2024-12-20 PROCEDURE — 87186 SC STD MICRODIL/AGAR DIL: CPT

## 2024-12-20 PROCEDURE — C1887: CPT

## 2024-12-20 PROCEDURE — 80053 COMPREHEN METABOLIC PANEL: CPT

## 2024-12-20 PROCEDURE — 85027 COMPLETE CBC AUTOMATED: CPT

## 2024-12-20 PROCEDURE — 85025 COMPLETE CBC W/AUTO DIFF WBC: CPT

## 2024-12-20 PROCEDURE — 94640 AIRWAY INHALATION TREATMENT: CPT

## 2024-12-20 PROCEDURE — 97110 THERAPEUTIC EXERCISES: CPT

## 2024-12-20 PROCEDURE — 84100 ASSAY OF PHOSPHORUS: CPT

## 2024-12-20 PROCEDURE — 97530 THERAPEUTIC ACTIVITIES: CPT

## 2024-12-20 RX ORDER — FUROSEMIDE 40 MG/1
20 TABLET ORAL DAILY
Refills: 0 | Status: DISCONTINUED | OUTPATIENT
Start: 2024-12-20 | End: 2024-12-20

## 2024-12-20 RX ORDER — FUROSEMIDE 40 MG/1
1 TABLET ORAL
Qty: 30 | Refills: 0
Start: 2024-12-20 | End: 2025-01-18

## 2024-12-20 RX ORDER — SODIUM ZIRCONIUM CYCLOSILICATE 5 G/5G
1 POWDER, FOR SUSPENSION ORAL
Qty: 30 | Refills: 0
Start: 2024-12-20 | End: 2025-01-18

## 2024-12-20 RX ORDER — SODIUM BICARBONATE 84 MG/ML
2 INJECTION, SOLUTION INTRAVENOUS
Qty: 120 | Refills: 0
Start: 2024-12-20 | End: 2025-01-18

## 2024-12-20 RX ORDER — SODIUM BICARBONATE 84 MG/ML
1300 INJECTION, SOLUTION INTRAVENOUS
Refills: 0 | Status: DISCONTINUED | OUTPATIENT
Start: 2024-12-20 | End: 2024-12-20

## 2024-12-20 RX ADMIN — Medication 81 MILLIGRAM(S): at 17:38

## 2024-12-20 RX ADMIN — RANOLAZINE 500 MILLIGRAM(S): 1000 TABLET, FILM COATED, EXTENDED RELEASE ORAL at 17:38

## 2024-12-20 RX ADMIN — SODIUM BICARBONATE 1300 MILLIGRAM(S): 84 INJECTION, SOLUTION INTRAVENOUS at 17:38

## 2024-12-20 RX ADMIN — ACETAMINOPHEN 500MG 650 MILLIGRAM(S): 500 TABLET, COATED ORAL at 13:45

## 2024-12-20 RX ADMIN — ACETAMINOPHEN 500MG 650 MILLIGRAM(S): 500 TABLET, COATED ORAL at 01:45

## 2024-12-20 RX ADMIN — ACETAMINOPHEN 500MG 650 MILLIGRAM(S): 500 TABLET, COATED ORAL at 12:47

## 2024-12-20 RX ADMIN — Medication 60 MILLIGRAM(S): at 17:38

## 2024-12-20 RX ADMIN — Medication 30 MILLILITER(S): at 00:44

## 2024-12-20 RX ADMIN — CLOPIDOGREL 75 MILLIGRAM(S): 75 TABLET, FILM COATED ORAL at 17:38

## 2024-12-20 RX ADMIN — AMLODIPINE BESYLATE 10 MILLIGRAM(S): 10 TABLET ORAL at 05:15

## 2024-12-20 RX ADMIN — Medication 2: at 12:48

## 2024-12-20 RX ADMIN — Medication 5 UNIT(S): at 12:49

## 2024-12-20 RX ADMIN — RANOLAZINE 500 MILLIGRAM(S): 1000 TABLET, FILM COATED, EXTENDED RELEASE ORAL at 05:15

## 2024-12-20 RX ADMIN — ACETAMINOPHEN 500MG 650 MILLIGRAM(S): 500 TABLET, COATED ORAL at 00:44

## 2024-12-20 RX ADMIN — FUROSEMIDE 20 MILLIGRAM(S): 40 TABLET ORAL at 12:48

## 2024-12-20 RX ADMIN — METOPROLOL TARTRATE 100 MILLIGRAM(S): 100 TABLET, FILM COATED ORAL at 05:14

## 2024-12-20 NOTE — DISCHARGE NOTE PROVIDER - CARE PROVIDER_API CALL
Yoel Ramirez  Podiatric Medicine and Surgery  [address] [place] [id number]  Phone: [phone]  Fax: [phone]  Follow Up Time:     Mendoza Pham  Cardiovascular Disease  [address] [facility], [address] [address]  Phone: [phone]  Fax: [phone]  Follow Up Time:

## 2024-12-20 NOTE — PROGRESS NOTE ADULT - SUBJECTIVE AND OBJECTIVE BOX
Podiatry Interval: Patient was seen bedside resting comfortably accompanied by her son. No acute overnight events noted. Patient states she is still having pain around the contusion site, but overall improved from when she was first admitted. Patient denies any open lesions. Patient denies any numbness, tingling, or burning. Patient denies calf tenderness bilaterally. She has been compliant with keeping her ACE wrap clean, dry, and intact. She has also been remaining NWB to the E at all times. Patient denies any other pedal complaints and no constitutional symptoms such as F/C/N/V/SOB. AAOx3, NAD.    Patient is a 74y old  Female who presents with a chief complaint of Acute on Chronic HFrEF Exacerbation (14 Dec 2024 16:08)    Podiatry HPI: 74F with PMHX CAD s/p PCI, HFrEF, Mobitz II s/p PPM (MIRNA Dawn), PAD with chronic right LE wound, CVA, DM2, CKD3, HTN presents to [facility] ER c/o shortness of breath/RAMIREZ and worsening LE edema. ROS +nonradiating CP and nonproductive cough. Patient states while in the ED an oxygen tank fell on her foot and she has had bruising and swelling since the incident happened. Patient states as of the last 2 days it has become more painful to walk and pain to touch. No open wounds noted     HPI: Pt seen/examined prior to midnight on 12/3/24. 74F with PMHX CAD s/p PCI, HFrEF, Mobitz II s/p PPM (MIRNA Dawn), PAD with chronic right LE wound, CVA, DM2, CKD3, HTN presents to [facility] ER c/o shortness of breath/RAMIREZ and worsening LE edema. ROS +nonradiating CP and nonproductive cough. Pt recently admitted for decompensated CHF and was doing well until she noted increased weight gain and called her Cardiologist Dr Allen who advised her to come to ER for further evaluation. Pt normally takes Lasix 20mg daily but took 40mg today PTA with improvement. CXR with congestive findings and mild effusions bilaterally. Crackles and pitting edema on exam. Labs notable for leukocytosis, CKD, and hyperkalemia.       Medications:  acetaminophen     Tablet .. 650 milliGRAM(s) Oral every 6 hours PRN  aluminum hydroxide/magnesium hydroxide/simethicone Suspension 30 milliLiter(s) Oral every 4 hours PRN  amLODIPine   Tablet 10 milliGRAM(s) Oral daily  aspirin enteric coated 81 milliGRAM(s) Oral daily  atorvastatin 80 milliGRAM(s) Oral at bedtime  clopidogrel Tablet 75 milliGRAM(s) Oral daily  dextrose 5%. 1000 milliLiter(s) IV Continuous <Continuous>  dextrose 5%. 1000 milliLiter(s) IV Continuous <Continuous>  dextrose 50% Injectable 25 Gram(s) IV Push once  dextrose 50% Injectable 12.5 Gram(s) IV Push once  dextrose 50% Injectable 25 Gram(s) IV Push once  dextrose Oral Gel 15 Gram(s) Oral once PRN  gabapentin 300 milliGRAM(s) Oral at bedtime  glucagon  Injectable 1 milliGRAM(s) IntraMuscular once  HYDROmorphone   Tablet 1 milliGRAM(s) Oral every 6 hours PRN  insulin glargine Injectable (LANTUS) 26 Unit(s) SubCutaneous at bedtime  insulin lispro (ADMELOG) corrective regimen sliding scale   SubCutaneous Before meals and at bedtime  insulin lispro Injectable (ADMELOG) 5 Unit(s) SubCutaneous three times a day before meals  isosorbide   mononitrate ER Tablet (IMDUR) 60 milliGRAM(s) Oral daily  lactated ringers. 1000 milliLiter(s) IV Continuous <Continuous>  melatonin 3 milliGRAM(s) Oral at bedtime PRN  metoprolol succinate  milliGRAM(s) Oral daily  ondansetron Injectable 4 milliGRAM(s) IV Push every 8 hours PRN  polyethylene glycol 3350 17 Gram(s) Oral daily  ranolazine 500 milliGRAM(s) Oral two times a day  senna 2 Tablet(s) Oral at bedtime  sodium zirconium cyclosilicate 10 Gram(s) Oral daily    FHx:  Family history of diabetes mellitus  FH: asthma    PMHx:   DM (diabetes mellitus)  HTN (hypertension)  Acute otitis media, unspecified otitis media type  H/O gastroesophageal reflux (GERD)  Cardiac pacemaker  CVA (cerebrovascular accident)  CVA (cerebrovascular accident)  PAD (peripheral artery disease)  Wound of right leg    PSHx:  History of benign brain tumor  Cataract  History of biopsy  Cardiac pacemaker  S/P angiogram of extremity      Labs                          10.6   7.89  )-----------( 440      ( 20 Dec 2024 05:00 )             32.8      12-20    137  |  104  |  43.9[H]  ----------------------------<  174[H]  5.0   |  21.0[L]  |  1.30    Ca    9.2      20 Dec 2024 05:00  Phos  4.0     12-20  Mg     2.7     12-20    TPro  7.1  /  Alb  3.5  /  TBili  0.3[L]  /  DBili  x   /  AST  15  /  ALT  13  /  AlkPhos  92  12-20     Vital Signs Last 24 Hrs  T(C): 36.7 (20 Dec 2024 08:31), Max: 36.7 (19 Dec 2024 16:57)  T(F): 98 (20 Dec 2024 08:31), Max: 98.1 (19 Dec 2024 21:47)  HR: 61 (20 Dec 2024 08:31) (61 - 74)  BP: 155/75 (20 Dec 2024 08:31) (122/64 - 169/98)  BP(mean): 102 (19 Dec 2024 21:47) (100 - 102)  RR: 18 (20 Dec 2024 08:31) (18 - 18)  SpO2: 98% (20 Dec 2024 08:31) (95% - 100%)    Parameters below as of 20 Dec 2024 08:31  Patient On (Oxygen Delivery Method): room air    Sedimentation Rate, Erythrocyte: 63 mm/hr (10-10-24 @ 05:16)  Sedimentation Rate, Erythrocyte: 72 mm/hr (10-09-24 @ 06:00)         C-Reactive Protein: 26 mg/L (10-09-24 @ 04:43)  WBC Count: 7.89 K/uL (12-20-24 @ 05:00)       LE FOCUSED PHYSICAL EXAM:     Vasc: DP/PT 1/4 bilaterally. CFT <3 seconds x 10. Temp Gradient within normal limits bilaterally, with mild increase in warmth over the right foot. Mild non-pitting edema without erythema localized to the contusion site, but significantly improved since admission, skin lines returned. No varicosities observed bilaterally.    Derm: Skin appears supple, well-hydrated, and intact bilaterally. R dorsal forefoot to midfoot ecchymosis and non-pitting edema noted to the 2-4th digits, but significantly improved since admission. No appreciable hematoma is noted. Small 1.0 x 1.0 patch of darkened tissue over the contusion site that is improving in appearance. No open lesions, no ulcerations, no skin tenting, no skin blanching, no fracture blisters, no purulence, no drainage, no soft tissue crepitus, no fluctuance, no IDM, no streaking, no signs of an acute/active infection.   Neuro: AAOx3, NAD. Protective sensation is intact via ipswitch 4/4 bilaterally. No paresthesias bilaterally.  MSK: Muscle strength deferred. Moderate pain upon palpation over the right foot contusion. Moderate pain upon ROM of the right foot pedal joints. No crepitation upon ROM of joints. Negative calf tenderness bilaterally. Compartments are compressible, patient able to wiggle toes without pain out of proportion.       IMAGING:     ACC: [id number] EXAM:  XR FOOT COMP MIN 3 VIEWS RT   ORDERED BY: KERLINE DOCKERY   PROCEDURE DATE:  12/04/2024    INTERPRETATION:  INDICATION: Trauma to the right foot.  TECHNIQUE: 3 views of the right foot were obtained    FINDINGS: No fracture or dislocation is seen. There is mild hallux   valgus. There is a small plantar calcaneal spur. There are vascular   calcifications. There are overlying bandages.    IMPRESSION: No fracture or dislocation visualized in the right foot.    --- End of Report ---      ACC: [id number] EXAM:  XR FOOT COMP MIN 3 VIEWS RT   ORDERED BY: GARTH IBARRA   PROCEDURE DATE:  12/15/2024    INTERPRETATION:  DATE OF STUDY: 12/15/24  COMPARISON: 12/4/24 plain right foot films; 12/10/24 CT scan of right foot  CLINICAL HISTORY:  Right foot pain and swelling    FINDINGS:  3 views of the right foot are submitted.  No fracture or dislocation.  Mild hallux valgus.  Small plantar calcaneal spur.  No bony erosive or destructive lesions. No tracking of soft tissue air.  No vascular calcifications.    IMPRESSION:  No discrete osteomyelitic erosions.    --- End of Report ---      ACC: [id number] EXAM:  CT FOOT ONLY RT   ORDERED BY: DARLENE RUIZ   PROCEDURE DATE:  12/10/2024    INTERPRETATION:  CT OF THE RIGHT FOOT  CLINICAL INFORMATION: Right foot pain redness and swelling.  TECHNIQUE: Multidetector CT of the right foot. The study was performed   without the use of intravenous or intra-articular contrast. Multiplanar   reformats were generated for review.    COMPARISON: Right foot radiographs 12/4/2024 and 3/2/2024.    FINDINGS:    BONE: No acute fracture. No cortical destruction or periosteal new bone   formation.    JOINTS: No dislocation. Cartilage spaces are maintained.    SOFT TISSUE: Soft tissue swelling over the dorsum of the foot at the   level of the metatarsals. No focal drainable fluid collection.Mild   atrophy of the intrinsic muscles of the foot. Normal CT appearance of the   imaged tendons. Vascular calcifications. No tracking subcutaneous   emphysema.    IMPRESSION:  1.  Edema in the subcutaneous fat along the dorsum of the foot. No   drainable fluid collection is identified.  2.  No CT evidence of advanced osteomyelitis.    --- End of Report ---      ACC: [id number] EXAM:  US NONVASC EXT LTD RT   ORDERED BY: GARTH IBARRA   PROCEDURE DATE:  12/15/2024    INTERPRETATION:  CLINICAL INFORMATION: Swelling over the right foot.  COMPARISON: Right foot CT dated 12/10/2024  TECHNIQUE:  Ultrasound the right foot is performed.    IMPRESSION:    There is 5.3 x 3.1 x 0.8 cm complex subcutaneous fluid collection along   the dorsal aspect of the right fourth. The collections contain diffuse   internal fine echoes. No internal vascularity. This may represent an   abscess.    --- End of Report ---

## 2024-12-20 NOTE — PROGRESS NOTE ADULT - PROVIDER SPECIALTY LIST ADULT
Hospitalist
Internal Medicine
Internal Medicine
Podiatry
Cardiology
Cardiology
Hospitalist
Nephrology
Nephrology
Cardiology
Hospitalist
Internal Medicine
Nephrology
Podiatry
Gastroenterology
Hospitalist
Nephrology
Cardiology

## 2024-12-20 NOTE — DISCHARGE NOTE PROVIDER - NSDCMRMEDTOKEN_GEN_ALL_CORE_FT
amLODIPine 10 mg oral tablet: 1 tab(s) orally once a day  aspirin 81 mg oral delayed release tablet: 1 tab(s) orally once a day  atorvastatin 80 mg oral tablet: 1 tab(s) orally once a day (at bedtime)  clopidogrel 75 mg oral tablet: 1 tab(s) orally once a day  ferrous sulfate 325 mg (65 mg elemental iron) oral tablet: 1 tab(s) orally 3 times a day  furosemide 40 mg oral tablet: 1 tab(s) orally once a day as needed for As per Cardiomems data  gabapentin 300 mg oral capsule: 1 cap(s) orally once a day (at bedtime)  insulin glargine 100 units/mL subcutaneous solution: 26 unit(s) subcutaneous once a day (at bedtime)  isosorbide mononitrate 60 mg oral tablet, extended release: 1 tab(s) orally once a day  metFORMIN 1000 mg oral tablet: 1 tab(s) orally 2 times a day (with meals)  metoprolol succinate 100 mg oral tablet, extended release: 1 tab(s) orally once a day  ranolazine 500 mg oral tablet, extended release: 1 tab(s) orally 2 times a day  sodium bicarbonate 650 mg oral tablet: 2 tab(s) orally 2 times a day  sodium zirconium cyclosilicate 10 g oral powder for reconstitution: 1 packet(s) orally once a day

## 2024-12-20 NOTE — PROGRESS NOTE ADULT - ASSESSMENT
A) DM 2, Right  foot healing, Anemia, NAGMA. ARF  improving  with possible  underlying  CRF.    P) PO bicarb., meds  same.

## 2024-12-20 NOTE — DISCHARGE NOTE PROVIDER - ATTENDING DISCHARGE PHYSICAL EXAMINATION:
Vital Signs Last 24 Hrs  T(C): 36.7 (20 Dec 2024 08:31), Max: 36.7 (19 Dec 2024 16:57)  T(F): 98 (20 Dec 2024 08:31), Max: 98.1 (19 Dec 2024 21:47)  HR: 61 (20 Dec 2024 08:31) (61 - 74)  BP: 155/75 (20 Dec 2024 08:31) (122/64 - 169/98)  BP(mean): 102 (19 Dec 2024 21:47) (100 - 102)  RR: 18 (20 Dec 2024 08:31) (18 - 18)  SpO2: 98% (20 Dec 2024 08:31) (95% - 100%)    Parameters below as of 20 Dec 2024 08:31  Patient On (Oxygen Delivery Method): room air    CONSTITUTIONAL: NAD, Not in acute distress  EYES:  EOMI, conjunctiva and sclera clear  ENMT: , neck supple , non-tender  RESPIRATORY: Normal respiratory effort; lungs are clear to auscultation bilaterally  CARDIOVASCULAR: S1S2 present  ABDOMEN: soft. bowel sound present  MUSCLOSKELETAL: ; no clubbing or cyanosis of digits;   PSYCH: A+O to person, place, and time; affect appropriate  NEUROLOGY: CN, motor and sensory intact   SKIN: No rashes; no palpable lesions  Extremity: RIght foot covered with dressing

## 2024-12-20 NOTE — DISCHARGE NOTE PROVIDER - DETAILS OF MALNUTRITION DIAGNOSIS/DIAGNOSES
This patient has been assessed with a concern for Malnutrition and was treated during this hospitalization for the following Nutrition diagnosis/diagnoses:     -  12/19/2024: Moderate protein-calorie malnutrition

## 2024-12-20 NOTE — PROGRESS NOTE ADULT - REASON FOR ADMISSION
Acute on Chronic HFrEF Exacerbation

## 2024-12-20 NOTE — DISCHARGE NOTE PROVIDER - NSDCCPCAREPLAN_GEN_ALL_CORE_FT
PRINCIPAL DISCHARGE DIAGNOSIS  Diagnosis: Acute on chronic systolic congestive heart failure  Assessment and Plan of Treatment: Follow up [facility] cardiologist      SECONDARY DISCHARGE DIAGNOSES  Diagnosis: Acute on chronic systolic congestive heart failure  Assessment and Plan of Treatment: Take medication as prescribed    Diagnosis: Avulsion fracture  Assessment and Plan of Treatment: Follow up with podiatrist  and apply boot while walking

## 2024-12-20 NOTE — PROGRESS NOTE ADULT - SUBJECTIVE AND OBJECTIVE BOX
NEPHROLOGY INTERVAL HPI/OVERNIGHT EVENTS:    No new events.    MEDICATIONS  (STANDING):  amLODIPine   Tablet 10 milliGRAM(s) Oral daily  aspirin enteric coated 81 milliGRAM(s) Oral daily  atorvastatin 80 milliGRAM(s) Oral at bedtime  clopidogrel Tablet 75 milliGRAM(s) Oral daily  dextrose 5%. 1000 milliLiter(s) (50 mL/Hr) IV Continuous <Continuous>  dextrose 5%. 1000 milliLiter(s) (100 mL/Hr) IV Continuous <Continuous>  dextrose 50% Injectable 25 Gram(s) IV Push once  dextrose 50% Injectable 12.5 Gram(s) IV Push once  dextrose 50% Injectable 25 Gram(s) IV Push once  gabapentin 300 milliGRAM(s) Oral at bedtime  glucagon  Injectable 1 milliGRAM(s) IntraMuscular once  insulin glargine Injectable (LANTUS) 26 Unit(s) SubCutaneous at bedtime  insulin lispro (ADMELOG) corrective regimen sliding scale   SubCutaneous Before meals and at bedtime  insulin lispro Injectable (ADMELOG) 5 Unit(s) SubCutaneous three times a day before meals  isosorbide   mononitrate ER Tablet (IMDUR) 60 milliGRAM(s) Oral daily  lactated ringers. 1000 milliLiter(s) (50 mL/Hr) IV Continuous <Continuous>  metoprolol succinate  milliGRAM(s) Oral daily  polyethylene glycol 3350 17 Gram(s) Oral daily  ranolazine 500 milliGRAM(s) Oral two times a day  senna 2 Tablet(s) Oral at bedtime  sodium zirconium cyclosilicate 10 Gram(s) Oral daily    MEDICATIONS  (PRN):  acetaminophen     Tablet .. 650 milliGRAM(s) Oral every 6 hours PRN Temp greater or equal to 38C (100.4F), Mild Pain (1 - 3)  aluminum hydroxide/magnesium hydroxide/simethicone Suspension 30 milliLiter(s) Oral every 4 hours PRN Dyspepsia  dextrose Oral Gel 15 Gram(s) Oral once PRN Blood Glucose LESS THAN 70 milliGRAM(s)/deciliter  HYDROmorphone   Tablet 1 milliGRAM(s) Oral every 6 hours PRN Severe Pain (7 - 10)  melatonin 3 milliGRAM(s) Oral at bedtime PRN Insomnia  ondansetron Injectable 4 milliGRAM(s) IV Push every 8 hours PRN Nausea and/or Vomiting      Allergies    No Known Allergies        Vital Signs Last 24 Hrs  T(C): 36.7 (20 Dec 2024 08:31), Max: 36.7 (19 Dec 2024 16:57)  T(F): 98 (20 Dec 2024 08:31), Max: 98.1 (19 Dec 2024 21:47)  HR: 61 (20 Dec 2024 08:31) (61 - 74)  BP: 155/75 (20 Dec 2024 08:31) (122/64 - 169/98)  BP(mean): 102 (19 Dec 2024 21:47) (100 - 102)  RR: 18 (20 Dec 2024 08:31) (18 - 18)  SpO2: 98% (20 Dec 2024 08:31) (95% - 100%)    Parameters below as of 20 Dec 2024 08:31  Patient On (Oxygen Delivery Method): room air      T(C): 36.7 (19 Dec 2024 08:39), Max: 36.8 (18 Dec 2024 20:40)  T(F): 98 (19 Dec 2024 08:39), Max: 98.2 (18 Dec 2024 20:40)  HR: 72 (19 Dec 2024 08:39) (68 - 79)  BP: 160/73 (19 Dec 2024 08:39) (112/62 - 160/73)  BP(mean): 84 (19 Dec 2024 04:56) (79 - 92)  RR: 18 (19 Dec 2024 04:56) (18 - 18)  SpO2: 97% (19 Dec 2024 08:39) (95% - 97%)    Parameters below as of 19 Dec 2024 08:39  Patient On (Oxygen Delivery Method): room air   Daily Weight in k.7 (19 Dec 2024 04:56)    PHYSICAL EXAM:    GENERAL: Comfortable, son  present  HEENT: No periorbital edema  NECK: Supple, No JVD  NERVOUS SYSTEM: Awake, alert  CHEST: Clear, no 02  HEART:  RRR, No rub  ABDOMEN: Soft,  BS+  EXTREMITIES: Right  foot  with  clean  wrap  : Neg      LABS:        137  |  104  |  43.9[H]  ----------------------------<  174[H]  5.0   |  21.0[L]  |  1.30    Ca    9.2      20 Dec 2024 05:00  Phos  4.0       Mg     2.7         TPro  7.1  /  Alb  3.5  /  TBili  0.3[L]  /  DBili  x   /  AST  15  /  ALT  13  /  AlkPhos  92      136  |  102  |  55.1[H]  ----------------------------<  116[H]  4.4   |  21.0[L]  |  1.57[H]    Ca    9.2      19 Dec 2024 05:25        Urinalysis Basic - ( 19 Dec 2024 05:25 )    Color: x / Appearance: x / SG: x / pH: x  Gluc: 116 mg/dL / Ketone: x  / Bili: x / Urobili: x   Blood: x / Protein: x / Nitrite: x   Leuk Esterase: x / RBC: x / WBC x   Sq Epi: x / Non Sq Epi: x / Bacteria: x              RADIOLOGY & ADDITIONAL TESTS:

## 2024-12-20 NOTE — DISCHARGE NOTE PROVIDER - NSDCFUSCHEDAPPT_GEN_ALL_CORE_FT
Dylan Canchola  [facility]  NEPHRO [address]  Scheduled Appointment: 02/05/2025

## 2024-12-20 NOTE — PROGRESS NOTE ADULT - ASSESSMENT
A:  Right foot contusion   Possible right 3rd digit chip fracture    P:   Patient evaluated, chart reviewed and updated  Xray and CT reviewed from ED admission - no acute fractures noted   Repeat xray from 12/15 - questionable avulsion fx on the base of the 3rd digit proximal phalanx - no surgical intervention warranted  ANGEL/PVR to further assess vascularity of the RLE as pulses were very faintly palpable bilaterally - Pending  Discussed diagnosis and treatment plans with the patient  Contusion without any appreciable hematoma - edema and ecchymosis seems to be significantly improved at this time. No open lesions or signs of infection. No fluctuant tissues.   Re-applied ACE compression and an ice pack over the right foot  Please continue to ice the right foot and keep the RLE elevated  Patient may WBAT to the right heel, to be provided to her prior to discharge  All questions and concerns addressed to the patient's satisfaction. Patient demonstrated verbal understanding.   Patient is stable from podiatry's standpoint. No surgical intervention or further examination is necessary at this time.  Recommending [facility] placement considering patient also has other comorbidities and patient's son is requesting. Son states that he would like 24 hour care for her which is hard to do at home since patient is a fall risk with her difficulty weightbearing at this time unless [facility] is able to provide a live-in nurse.  Upon discharge, patient can follow up outpatient with Dr. Yoel Ramirez at [address] [place] [address]. Please call [phone] to make an appointment within 1 week.   Seen, evaluated, and treated with attending Dr. Grant. Discussed with attending Dr. Ramirez.  A:  Right foot contusion   Possible right 3rd digit chip fracture    P:   Patient evaluated, chart reviewed and updated  Xray and CT reviewed from ED admission - no acute fractures noted   Repeat xray from 12/15 - questionable avulsion fx on the base of the 3rd digit proximal phalanx - no surgical intervention warranted  ANGEL/PVR to further assess vascularity of the RLE as pulses were very faintly palpable bilaterally, but as there is no wound as of now, this can be done at her outpatient follow up appointment.   Discussed diagnosis and treatment plans with the patient  Contusion without any appreciable hematoma - edema and ecchymosis seems to be significantly improved at this time. No open lesions or signs of infection. No fluctuant tissues.   Re-applied ACE compression and an ice pack over the right foot  Please continue to ice the right foot and keep the RLE elevated  Patient may WBAT to the right heel, to be provided to her prior to discharge  All questions and concerns addressed to the patient's satisfaction. Patient demonstrated verbal understanding.   Patient is stable from podiatry's standpoint. No surgical intervention or further examination is necessary at this time.  Recommending [facility] placement considering patient also has other comorbidities and patient's son is requesting. Son states that he would like 24 hour care for her which is hard to do at home since patient is a fall risk with her difficulty weightbearing at this time unless [facility] is able to provide a live-in nurse.  Upon discharge, patient can follow up outpatient with Dr. Yoel Ramirez at [address] [place] [address]. Please call [phone] to make an appointment within 1 week.   Seen, evaluated, and treated with attending Dr. Grant. Discussed with attending Dr. Ramirez.  A:  Right foot contusion   Possible right 3rd digit chip fracture    P:   Patient evaluated, chart reviewed and updated  Xray and CT reviewed from ED admission - no acute fractures noted   Repeat xray from 12/15 - questionable avulsion fx on the base of the 3rd digit proximal phalanx - no surgical intervention warranted  ANGEL/PVR to further assess vascularity of the RLE as pulses were very faintly palpable bilaterally, but as there is no wound as of now, this can be done at her outpatient follow up appointment.   Discussed diagnosis and treatment plans with the patient  Contusion without any appreciable hematoma - edema and ecchymosis seems to be significantly improved at this time. No open lesions or signs of infection. No fluctuant tissues.   Re-applied ACE compression and an ice pack over the right foot  Please continue to ice the right foot and keep the RLE elevated  Patient may WBAT to the right heel, to be provided to her prior to discharge  All questions and concerns addressed to the patient's satisfaction. Patient demonstrated verbal understanding.   Patient is stable from podiatry's standpoint. No surgical intervention or further examination is necessary at this time.  Recommending [facility] placement considering patient also has other comorbidities and patient's son is requesting. Son states that he would like 24 hour care for her which is hard to do at home since patient is a fall risk with her difficulty weightbearing at this time unless [facility] is able to provide a live-in nurse.  Upon discharge, patient can follow up outpatient with Dr. Yoel Ramirez at [address] [place] [address]. Please call [phone] to make an appointment within 1 week.   Seen, evaluated, and treated with attending Dr. Grant. Discussed with attending Dr. Ramirez.

## 2024-12-20 NOTE — DISCHARGE NOTE PROVIDER - CARE PROVIDERS DIRECT ADDRESSES
[email or web address]

## 2024-12-20 NOTE — PROGRESS NOTE ADULT - ATTENDING COMMENTS
Foot edema contusion after injury possible chip fracture, edema improving  reluctant to put weight on foot  no surgical intervention  continue PT if having trouble walking  After conversation with family sounds like they are in need of care at home for her   Follow up outpatient

## 2024-12-20 NOTE — PROGRESS NOTE ADULT - NUTRITIONAL ASSESSMENT
This patient has been assessed with a concern for Malnutrition and has been determined to have a diagnosis/diagnoses of Moderate protein-calorie malnutrition.    This patient is being managed with:   Diet DASH/TLC-  Sodium & Cholesterol Restricted  Consistent Carbohydrate {Evening Snack} (CSTCHOSN)  1500mL Fluid Restriction ([id number])  No Concentrated Potassium  No Concentrated Phosphorus  Entered: Dec 20 2024 10:09AM

## 2024-12-20 NOTE — DISCHARGE NOTE PROVIDER - HOSPITAL COURSE
74 F with PMHX CAD s/p PCI, HFrEF, Mobitz II s/p PPM (MIRNA Dawn), PAD with chronic right LE wound, CVA, DM2, CKD3, HTN presents to [facility] ER c/o shortness of breath/RAMIREZ and worsening LE edema admitted for Acute on Chronic HFrEF Exacerbation and Hyperkalemia. Now s;/p diuresis and cardiomems placement with improvement. Found to have ESBL E.Coli UTI .Completed ertapenem. Course complicated with worsening right foot pain and swelling. FOund to have avulsion fracture. TReated conservatively. Discharged to Rehab.    #Acute Hypoxic Respiratory failure secondary to   #Acute on Chronic CHF wirh reduced EF 2/2 isch CM s/p PCIs with MVD / CAD  -Resolved, comfortable on RA now   -Furosemide 40 mg daily PRN as per cardio mems data  -spironolactone and Losartan on hold due to hyperkalemia  -Continue DAPT   -Continue Ranexa   -Continue Statin   -Metoprolol  -TTE reviewed  -s/p RHC and Cardiomems placement    #Foot pain:   # Right Foot avulsion fracture  - Initial xrays and CT scan is negative for fracture  -But, right foot pain and swelling is worsening  -Repeat Xray concerning for evulsion fracture  -US concerning for abscess but Podiatry recommended most likely hematoma and recommended conservative management  -Follow up with podiatry  -Heel weight bearing and Pain control with Ice pack , dilaudid, ace wrap        #Hyperkalemia   5.5 (non hemolyzed today)  Lokelma 10 mg daily  held Aldactone to 12.5 mg daily   [facility] nephrology is following  Potassium stable    #BRADY on CKD stage 3b   Hyperkalemia at presentation, Resolved  Based on prior renla inces, Cr Cl more or ess at baseline  Permissive Azotemia with diuretics acceptable for cardiac optimization   Avoid Nephrotoxins  Hold aldectone and ARNi due to hyperkalemia  [facility] consulted    #Anemia ( Iron deficiency)  Stable   completed IV Venofer 200 mg  monitor Hgb  Outpt GI colonoscopy or screening   Hb is ~10    #CAD s/p PCI,   Mobitz Type 2 s/p MicraPPM  DAPT, Statin, BB, Nitrate and antianginal    #Recurring UTIs  Per son, h/o recurrent UTIs  Now with ESBL E.Coli. No oral options  Received Meropenem for 3 days--> 2 days of ertapenem     #Diabetes Mellitus type2 on insulin with neuropathy   Resume home medication      UTI: ESBL. Completed 5 days of ertapenem on     VTE Prophylaxis: SCDs/SQH 5000q8

## 2025-01-02 ENCOUNTER — NON-APPOINTMENT (OUTPATIENT)
Age: 75
End: 2025-01-02

## 2025-01-06 ENCOUNTER — APPOINTMENT (OUTPATIENT)
Age: 75
End: 2025-01-06
Payer: MEDICARE

## 2025-01-06 ENCOUNTER — NON-APPOINTMENT (OUTPATIENT)
Age: 75
End: 2025-01-06

## 2025-01-06 DIAGNOSIS — L97.512 NON-PRESSURE CHRONIC ULCER OF OTHER PART OF RIGHT FOOT WITH FAT LAYER EXPOSED: ICD-10-CM

## 2025-01-06 DIAGNOSIS — S90.30XS: ICD-10-CM

## 2025-01-06 DIAGNOSIS — E11.51 TYPE 2 DIABETES MELLITUS WITH DIABETIC PERIPHERAL ANGIOPATHY W/OUT GANGRENE: ICD-10-CM

## 2025-01-06 DIAGNOSIS — M79.671 PAIN IN RIGHT FOOT: ICD-10-CM

## 2025-01-06 PROCEDURE — 99214 OFFICE O/P EST MOD 30 MIN: CPT | Mod: 25

## 2025-01-06 PROCEDURE — 11042 DBRDMT SUBQ TIS 1ST 20SQCM/<: CPT | Mod: RT

## 2025-01-07 ENCOUNTER — APPOINTMENT (OUTPATIENT)
Dept: CARDIOLOGY | Facility: CLINIC | Age: 75
End: 2025-01-07

## 2025-01-07 ENCOUNTER — NON-APPOINTMENT (OUTPATIENT)
Age: 75
End: 2025-01-07

## 2025-01-07 ENCOUNTER — INPATIENT (INPATIENT)
Facility: HOSPITAL | Age: 75
LOS: 6 days | Discharge: HOME | DRG: 293 | End: 2025-01-14
Attending: FAMILY MEDICINE | Admitting: STUDENT IN AN ORGANIZED HEALTH CARE EDUCATION/TRAINING PROGRAM
Payer: MEDICAID

## 2025-01-07 VITALS
SYSTOLIC BLOOD PRESSURE: 134 MMHG | RESPIRATION RATE: 18 BRPM | DIASTOLIC BLOOD PRESSURE: 64 MMHG | HEART RATE: 62 BPM | HEIGHT: 62 IN | TEMPERATURE: 98 F | WEIGHT: 162.04 LBS | OXYGEN SATURATION: 92 %

## 2025-01-07 DIAGNOSIS — Z98.890 OTHER SPECIFIED POSTPROCEDURAL STATES: Chronic | ICD-10-CM

## 2025-01-07 DIAGNOSIS — Z86.011 PERSONAL HISTORY OF BENIGN NEOPLASM OF THE BRAIN: Chronic | ICD-10-CM

## 2025-01-07 DIAGNOSIS — I50.9 HEART FAILURE, UNSPECIFIED: ICD-10-CM

## 2025-01-07 DIAGNOSIS — H26.9 UNSPECIFIED CATARACT: Chronic | ICD-10-CM

## 2025-01-07 DIAGNOSIS — Z95.0 PRESENCE OF CARDIAC PACEMAKER: Chronic | ICD-10-CM

## 2025-01-07 LAB
ALBUMIN SERPL ELPH-MCNC: 3.8 G/DL — SIGNIFICANT CHANGE UP (ref 3.3–5.2)
ALP SERPL-CCNC: 108 U/L — SIGNIFICANT CHANGE UP (ref 40–120)
ALT FLD-CCNC: 24 U/L — SIGNIFICANT CHANGE UP
ANION GAP SERPL CALC-SCNC: 15 MMOL/L — SIGNIFICANT CHANGE UP (ref 5–17)
AST SERPL-CCNC: 24 U/L — SIGNIFICANT CHANGE UP
BASOPHILS # BLD AUTO: 0.04 K/UL — SIGNIFICANT CHANGE UP (ref 0–0.2)
BASOPHILS NFR BLD AUTO: 0.5 % — SIGNIFICANT CHANGE UP (ref 0–2)
BILIRUB SERPL-MCNC: 0.3 MG/DL — LOW (ref 0.4–2)
BUN SERPL-MCNC: 40.7 MG/DL — HIGH (ref 8–20)
CALCIUM SERPL-MCNC: 9.1 MG/DL — SIGNIFICANT CHANGE UP (ref 8.4–10.5)
CHLORIDE SERPL-SCNC: 96 MMOL/L — SIGNIFICANT CHANGE UP (ref 96–108)
CO2 SERPL-SCNC: 24 MMOL/L — SIGNIFICANT CHANGE UP (ref 22–29)
CREAT SERPL-MCNC: 1.96 MG/DL — HIGH (ref 0.5–1.3)
EGFR: 27 ML/MIN/1.73M2 — LOW
EOSINOPHIL # BLD AUTO: 0.12 K/UL — SIGNIFICANT CHANGE UP (ref 0–0.5)
EOSINOPHIL NFR BLD AUTO: 1.4 % — SIGNIFICANT CHANGE UP (ref 0–6)
GLUCOSE SERPL-MCNC: 104 MG/DL — HIGH (ref 70–99)
HCT VFR BLD CALC: 26.8 % — LOW (ref 34.5–45)
HGB BLD-MCNC: 8.7 G/DL — LOW (ref 11.5–15.5)
IMM GRANULOCYTES NFR BLD AUTO: 1 % — HIGH (ref 0–0.9)
LYMPHOCYTES # BLD AUTO: 1.67 K/UL — SIGNIFICANT CHANGE UP (ref 1–3.3)
LYMPHOCYTES # BLD AUTO: 19.8 % — SIGNIFICANT CHANGE UP (ref 13–44)
MCHC RBC-ENTMCNC: 27.5 PG — SIGNIFICANT CHANGE UP (ref 27–34)
MCHC RBC-ENTMCNC: 32.5 G/DL — SIGNIFICANT CHANGE UP (ref 32–36)
MCV RBC AUTO: 84.8 FL — SIGNIFICANT CHANGE UP (ref 80–100)
MONOCYTES # BLD AUTO: 0.69 K/UL — SIGNIFICANT CHANGE UP (ref 0–0.9)
MONOCYTES NFR BLD AUTO: 8.2 % — SIGNIFICANT CHANGE UP (ref 2–14)
NEUTROPHILS # BLD AUTO: 5.82 K/UL — SIGNIFICANT CHANGE UP (ref 1.8–7.4)
NEUTROPHILS NFR BLD AUTO: 69.1 % — SIGNIFICANT CHANGE UP (ref 43–77)
NT-PROBNP SERPL-SCNC: HIGH PG/ML (ref 0–300)
PLATELET # BLD AUTO: 412 K/UL — HIGH (ref 150–400)
POTASSIUM SERPL-MCNC: 5.1 MMOL/L — SIGNIFICANT CHANGE UP (ref 3.5–5.3)
POTASSIUM SERPL-SCNC: 5.1 MMOL/L — SIGNIFICANT CHANGE UP (ref 3.5–5.3)
PROT SERPL-MCNC: 7.1 G/DL — SIGNIFICANT CHANGE UP (ref 6.6–8.7)
RBC # BLD: 3.16 M/UL — LOW (ref 3.8–5.2)
RBC # FLD: 19 % — HIGH (ref 10.3–14.5)
SODIUM SERPL-SCNC: 135 MMOL/L — SIGNIFICANT CHANGE UP (ref 135–145)
TROPONIN T, HIGH SENSITIVITY RESULT: 62 NG/L — HIGH (ref 0–51)
TROPONIN T, HIGH SENSITIVITY RESULT: 62 NG/L — HIGH (ref 0–51)
WBC # BLD: 8.42 K/UL — SIGNIFICANT CHANGE UP (ref 3.8–10.5)
WBC # FLD AUTO: 8.42 K/UL — SIGNIFICANT CHANGE UP (ref 3.8–10.5)

## 2025-01-07 PROCEDURE — 93010 ELECTROCARDIOGRAM REPORT: CPT

## 2025-01-07 PROCEDURE — 99284 EMERGENCY DEPT VISIT MOD MDM: CPT

## 2025-01-07 PROCEDURE — 99285 EMERGENCY DEPT VISIT HI MDM: CPT

## 2025-01-07 PROCEDURE — 71045 X-RAY EXAM CHEST 1 VIEW: CPT | Mod: 26

## 2025-01-07 PROCEDURE — 99223 1ST HOSP IP/OBS HIGH 75: CPT

## 2025-01-07 PROCEDURE — 99497 ADVNCD CARE PLAN 30 MIN: CPT | Mod: 25

## 2025-01-07 RX ORDER — HEPARIN SODIUM 1000 [USP'U]/ML
5000 INJECTION, SOLUTION INTRAVENOUS; SUBCUTANEOUS EVERY 8 HOURS
Refills: 0 | Status: DISCONTINUED | OUTPATIENT
Start: 2025-01-07 | End: 2025-01-14

## 2025-01-07 RX ORDER — FERROUS SULFATE 325(65) MG
325 TABLET ORAL DAILY
Refills: 0 | Status: DISCONTINUED | OUTPATIENT
Start: 2025-01-07 | End: 2025-01-14

## 2025-01-07 RX ORDER — INSULIN GLARGINE-YFGN 100 [IU]/ML
26 INJECTION, SOLUTION SUBCUTANEOUS AT BEDTIME
Refills: 0 | Status: DISCONTINUED | OUTPATIENT
Start: 2025-01-07 | End: 2025-01-12

## 2025-01-07 RX ORDER — INSULIN LISPRO 100/ML
VIAL (ML) SUBCUTANEOUS AT BEDTIME
Refills: 0 | Status: DISCONTINUED | OUTPATIENT
Start: 2025-01-07 | End: 2025-01-14

## 2025-01-07 RX ORDER — FUROSEMIDE 20 MG
40 TABLET ORAL DAILY
Refills: 0 | Status: DISCONTINUED | OUTPATIENT
Start: 2025-01-07 | End: 2025-01-09

## 2025-01-07 RX ORDER — DEXTROSE MONOHYDRATE 25 G/50ML
12.5 INJECTION, SOLUTION INTRAVENOUS ONCE
Refills: 0 | Status: DISCONTINUED | OUTPATIENT
Start: 2025-01-07 | End: 2025-01-14

## 2025-01-07 RX ORDER — GINKGO BILOBA 40 MG
3 CAPSULE ORAL AT BEDTIME
Refills: 0 | Status: DISCONTINUED | OUTPATIENT
Start: 2025-01-07 | End: 2025-01-14

## 2025-01-07 RX ORDER — DEXTROSE MONOHYDRATE 25 G/50ML
25 INJECTION, SOLUTION INTRAVENOUS ONCE
Refills: 0 | Status: DISCONTINUED | OUTPATIENT
Start: 2025-01-07 | End: 2025-01-14

## 2025-01-07 RX ORDER — ONDANSETRON 4 MG/1
4 TABLET ORAL EVERY 8 HOURS
Refills: 0 | Status: DISCONTINUED | OUTPATIENT
Start: 2025-01-07 | End: 2025-01-14

## 2025-01-07 RX ORDER — ASPIRIN 81 MG
81 TABLET, DELAYED RELEASE (ENTERIC COATED) ORAL DAILY
Refills: 0 | Status: DISCONTINUED | OUTPATIENT
Start: 2025-01-07 | End: 2025-01-14

## 2025-01-07 RX ORDER — GLUCAGON INJECTION, SOLUTION 0.5 MG/.1ML
1 INJECTION, SOLUTION SUBCUTANEOUS ONCE
Refills: 0 | Status: DISCONTINUED | OUTPATIENT
Start: 2025-01-07 | End: 2025-01-14

## 2025-01-07 RX ORDER — ACETAMINOPHEN 80 MG/.8ML
650 SOLUTION/ DROPS ORAL EVERY 6 HOURS
Refills: 0 | Status: DISCONTINUED | OUTPATIENT
Start: 2025-01-07 | End: 2025-01-14

## 2025-01-07 RX ORDER — SODIUM CHLORIDE 9 MG/ML
1000 INJECTION, SOLUTION INTRAVENOUS
Refills: 0 | Status: DISCONTINUED | OUTPATIENT
Start: 2025-01-07 | End: 2025-01-14

## 2025-01-07 RX ORDER — CLOPIDOGREL BISULFATE 75 MG/1
75 TABLET, FILM COATED ORAL DAILY
Refills: 0 | Status: DISCONTINUED | OUTPATIENT
Start: 2025-01-07 | End: 2025-01-14

## 2025-01-07 RX ORDER — METOPROLOL TARTRATE 50 MG
100 TABLET ORAL DAILY
Refills: 0 | Status: DISCONTINUED | OUTPATIENT
Start: 2025-01-07 | End: 2025-01-14

## 2025-01-07 RX ORDER — INSULIN LISPRO 100/ML
VIAL (ML) SUBCUTANEOUS
Refills: 0 | Status: DISCONTINUED | OUTPATIENT
Start: 2025-01-07 | End: 2025-01-14

## 2025-01-07 RX ORDER — ATORVASTATIN CALCIUM 40 MG/1
80 TABLET, FILM COATED ORAL AT BEDTIME
Refills: 0 | Status: DISCONTINUED | OUTPATIENT
Start: 2025-01-07 | End: 2025-01-14

## 2025-01-07 RX ORDER — MAG HYDROX/ALUMINUM HYD/SIMETH 200-200-20
30 SUSPENSION, ORAL (FINAL DOSE FORM) ORAL EVERY 4 HOURS
Refills: 0 | Status: DISCONTINUED | OUTPATIENT
Start: 2025-01-07 | End: 2025-01-14

## 2025-01-07 RX ORDER — SODIUM BICARBONATE 84 MG/ML
650 INJECTION, SOLUTION INTRAVENOUS
Refills: 0 | Status: DISCONTINUED | OUTPATIENT
Start: 2025-01-07 | End: 2025-01-14

## 2025-01-07 RX ORDER — RANOLAZINE 1000 MG/1
500 TABLET, FILM COATED, EXTENDED RELEASE ORAL
Refills: 0 | Status: DISCONTINUED | OUTPATIENT
Start: 2025-01-07 | End: 2025-01-14

## 2025-01-07 RX ORDER — GABAPENTIN 300 MG/1
300 CAPSULE ORAL AT BEDTIME
Refills: 0 | Status: DISCONTINUED | OUTPATIENT
Start: 2025-01-07 | End: 2025-01-14

## 2025-01-07 RX ORDER — ISOSORBIDE MONONITRATE 120 MG
60 TABLET, EXTENDED RELEASE 24 HR ORAL DAILY
Refills: 0 | Status: DISCONTINUED | OUTPATIENT
Start: 2025-01-07 | End: 2025-01-14

## 2025-01-07 RX ORDER — DEXTROSE MONOHYDRATE 25 G/50ML
15 INJECTION, SOLUTION INTRAVENOUS ONCE
Refills: 0 | Status: DISCONTINUED | OUTPATIENT
Start: 2025-01-07 | End: 2025-01-14

## 2025-01-07 RX ORDER — FUROSEMIDE 20 MG
40 TABLET ORAL ONCE
Refills: 0 | Status: COMPLETED | OUTPATIENT
Start: 2025-01-07 | End: 2025-01-07

## 2025-01-07 RX ADMIN — Medication 40 MILLIGRAM(S): at 17:28

## 2025-01-07 NOTE — ED ADULT TRIAGE NOTE - CHIEF COMPLAINT QUOTE
sent in by cardiologist. pt recently discharged from [facility] for heart failure 2 weeks ago. pt reports increased swelling in lower extremities, chest pain and difficulty breathing  for the last 3 days.

## 2025-01-07 NOTE — ED PROVIDER NOTE - CLINICAL SUMMARY MEDICAL DECISION MAKING FREE TEXT BOX
75 yo F with PMH as above presenting for increasing RAMIREZ, SOB and edema. PE is consistent with fluid overload. Lab work shows... CXR reviewed, not grossly fluid overloaded however congestion is present. Cardiology consulted, will f/u on recs. 73 yo F with PMH as above presenting for increasing RAMIREZ, SOB and edema. PE is consistent with fluid overload. Lab work shows elevated troponin to 62, repeat ordered. BNP is >40K. D/w with Dr. Givens who recommends patient to be admitted. CXR reviewed, not grossly fluid overloaded however congestion is present.

## 2025-01-07 NOTE — H&P ADULT - CONVERSATION DETAILS
Advance care discussed with patient,  at bed side and granddaughter via phone. Patient named her son Balaji as the health care proxy and said if her son is not available to call her granddaughter. She has not had this conversation in the past with the family. She has no restriction on medical management, she is requesting CPR and intubation if needed. Patient is FULL CODE. Conversation held using  (Language line).

## 2025-01-07 NOTE — H&P ADULT - TIME BILLING
chart, labs and imaging reviewed. Physical examination, medication reconciliation and documentation. Discussion with patient   at bedside using , son and granddaughter via phone and ER nurse

## 2025-01-07 NOTE — ED ADULT NURSE REASSESSMENT NOTE - NS ED NURSE REASSESS COMMENT FT1
Assumed care of patient at 1915, report received from off-going RN. Respirations even and unlabored on room air , c/o right leg pain, MD made aware pending orders., NAD. Wheels locked, bed in lowest position, CM shows paced rhythm,  @ 96%

## 2025-01-07 NOTE — ED ADULT NURSE NOTE - CHIEF COMPLAINT QUOTE
sent in by cardiologist. pt recently discharged from [facility] for heart failure 2 weeks ago. pt reports increased swelling in lower extremities, chest pain and difficulty breathing  for the last 3 days.

## 2025-01-07 NOTE — ED ADULT NURSE NOTE - OBJECTIVE STATEMENT
Pt arrives to the ED c/o of swelling to legs and face x2 days. Pt states she is also having chest pain and nausea. Hx of stent and pacemaker. As per daughter she denies shortness of breath but "I hear her wheezing at night". Hx of CHF. Pt is in paced ventricular rhythm on the cardiac monitor. Respirations even and unlabored on room air. Shakila Finch interpreting at patients request.

## 2025-01-07 NOTE — ED PROVIDER NOTE - NSICDXPASTSURGICALHX_GEN_ALL_CORE_FT
PAST SURGICAL HISTORY:  Cardiac pacemaker     Cataract     History of benign brain tumor     History of biopsy     S/P angiogram of extremity     
venodynes

## 2025-01-07 NOTE — ED PROVIDER NOTE - ATTENDING CONTRIBUTION TO CARE
I, Jim Flynn, performed a face to face bedside interview with this patient regarding history of present illness, and completed an independent physical examination. I personally made/approved the management plan and take responsibility for the patient management. I have communicated the patient’s plan of care and disposition with the resident.  74 year old with Hx CAD s/p PCI, HFrEF, Mobitz II s/p PPM (Nereyda, MDT), PAD with chronic right LE wound, CVA, DM2, CKD3, HTN presents with 1 week of b/l LE edema and SOB, associated burning chest pain  Gen: NAD, well appearing  CV: RRR  Pul: CTA b/l  Abd: Soft, non-distended, non-tender  Neuro: no focal deficits  Pt admitted for CHF exacerbation and diuresis. EKG without acute ischemia, trop mildly elevated likely due to CHF, always slightly positive at that level, low suspicion for ACS

## 2025-01-07 NOTE — H&P ADULT - NSHPPHYSICALEXAM_GEN_ALL_CORE
Vital Signs Last 24 Hrs  T(C): 36.2 (07 Jan 2025 19:24), Max: 36.4 (07 Jan 2025 14:24)  T(F): 97.2 (07 Jan 2025 19:24), Max: 97.6 (07 Jan 2025 14:24)  HR: 64 (07 Jan 2025 19:24) (62 - 64)  BP: 120/70 (07 Jan 2025 19:24) (120/70 - 134/64)  BP(mean): --  RR: 18 (07 Jan 2025 19:24) (18 - 18)  SpO2: 98% (07 Jan 2025 19:24) (92% - 98%)    Parameters below as of 07 Jan 2025 19:24  Patient On (Oxygen Delivery Method): room air

## 2025-01-07 NOTE — ED PROVIDER NOTE - NS ED ROS FT
CONSTITUTIONAL: Denies fever, chills, fatigue  HEENT: Denies acute changes in vision and hearing  CARDIO: (+) CP, SOB, palpitations  PULM: Denies cough, wheezing, SOB  ABD: Denies abd pain, n/v/d/c  : Denies dysuria, urinary frequency  NEURO: Denies HA, numbness/tingling  EXTREMITIES: Denies LE swelling, calf pain

## 2025-01-07 NOTE — H&P ADULT - ASSESSMENT
Supportive   DVT prophylaxis:   Diet:      Plan of care discussed with patient and ER nurse     Dispo: when stable, home.    73 y/o female with PMH of HTN, HLD, CAD s/p PCI, HFrEF, Mobitz II s/p PPM (MD NereydaT), PAD with chronic right LE wound, CVA, DM2, CKD3 came to the ED complaining of shortness of breath and fatigue x 4 days. Patient endorsed orthopnea, burning in her chest.       Acute on chronic CHFrE   Admit to telemetry   Troponin: 62-->62  ProBNP: 84179  Will resume Lasix 40mg daily   Monitor renal function while on diuretic   Daily weight   Cardiology on board   Oxygen therapy as needed      HTN/HLD/CAD   Amlodipine 10mg   Aspirin 81mg   Plavix 75mg   Atorvastatin 80mg   Metoprolol ER 100mg   Ranolazine Er 500mg bid   Imdur 60mg     DM-2   Hold Metformin 1000mg bid   Lantus 26 units HS   Insulin sliding scale     CKD-3   Cr. 1.96 (baseline 1.4-1.8)   Monitor renal function     Anemia   Likely of chronic disease   Hb stable   Iron tab (as per granddaughter, she now only takes it daily instead of tid)   Monitor CBC    Supportive   DVT prophylaxis: Heparin sc  Diet: DASH/CHO      Plan of care discussed with patient,  at bedside using , son and granddaughter via phone and ER nurse     Dispo: when stable, home.

## 2025-01-07 NOTE — CONSULT NOTE ADULT - ATTENDING COMMENTS
74 F with PMHX CAD s/p PCI, HFrEF, Mobitz II s/p PPM (MIRNA Dawn), PAD with chronic right LE wound, CVA, DM2, CKD3, HTN presents to [facility] ER c/o shortness of breath/RAMIREZ and worsening LE edema.   Presents with granddaughter    Acute on chronic heart failure exacerbation  hx of CAD s/p PCI with residual CAD      Patient examines warm and hypervolemic  has shortness of breath as symptom  Cardiomems interrogated bedside, poor waveform despite 100% green signal, PAD 21-24    Trial IV lasix diuresis, given clinical exam and elevated proBNP  no need for repeat echo at this time    we will follow along

## 2025-01-07 NOTE — ED PROVIDER NOTE - OBJECTIVE STATEMENT
74 F with PMHX CAD s/p PCI, HFrEF, Mobitz II s/p PPM (MIRNA Dawn), PAD with chronic right LE wound, CVA, DM2, CKD3, HTN presents to [facility] ER c/o shortness of breath/RAMIREZ and worsening LE edema. Patient was admitted for CHF exacerbation 20 days ago. Apparently the patient has not been taking her Lasix daily and only when she thinks she needs it. She has now developed b/l LE edema, has RAMIREZ and is fatigued for the past 3-5 days. Called her PCP who has been telling her to take 80 mg of Lasix daily for the past 48 hours with no improvement in sxs. She is accompanied by her daughter who states patient also had an episode of chest pain 2 days ago that resolved after receiving aspirin.    Denies any fevers, chills, rigors, abd pain, falls or syncope.

## 2025-01-07 NOTE — ED PROVIDER NOTE - PHYSICAL EXAMINATION
Gen: NAD, AOx3  Head: NCAT  HEENT: EOMI, oral mucosa moist, normal conjunctiva, neck supple  Lung: CTAB, no respiratory distress  CV: rrr, no murmur, Normal perfusion  Abd: soft, NTND  MSK: (+)2 Pitting edema b/l LE, no visible deformities  Neuro: No focal neurologic deficits  Skin: No rash   Psych: normal affect

## 2025-01-07 NOTE — H&P ADULT - HISTORY OF PRESENT ILLNESS
73 y/o female with PMH of CAD s/p PCI, HFrEF, Mobitz II s/p PPM (MIRNA Dawn), PAD with chronic right LE wound, CVA, DM2, CKD3, HTN presents to [facility] ER c/o shortness of breath/RAMIREZ and worsening LE edema. Patient was admitted for CHF exacerbation 20 days ago. Apparently the patient has not been taking her Lasix daily and only when she thinks she needs it. She has now developed b/l LE edema, has RAMIREZ and is fatigued for the past 3-5 days. Called her PCP who has been telling her to take 80 mg of Lasix daily for the past 48 hours with no improvement in sxs. She is accompanied by her daughter who states patient also had an episode of chest pain 2 days ago that resolved after receiving aspirin. Patient seen and examined before midnight.     75 y/o female with PMH of HTN, HLD, CAD s/p PCI, HFrEF, Mobitz II s/p PPM (MD NereydaT), PAD with chronic right LE wound, CVA, DM2, CKD3 came to the ED complaining of shortness of breath and fatigue x 4 days. Patient said "my body is full of water". As per son (Balaji) and granddaughter (Lexi) via phone, patient was discharged about 2 weeks ago and was told to use Lasix PRN. They reported patient gained 11lb in 4 days, cardiology called saying to give her Lasix 80mg which was given in the last 2 days. Patient endorsed orthopnea, burning in her chest. She has no fever, chills, nausea, vomiting, palpitation, abdominal pain, change in bowel/urinary habit, fall, HA, dizziness.

## 2025-01-08 DIAGNOSIS — I50.20 UNSPECIFIED SYSTOLIC (CONGESTIVE) HEART FAILURE: ICD-10-CM

## 2025-01-08 LAB
ANION GAP SERPL CALC-SCNC: 18 MMOL/L — HIGH (ref 5–17)
BUN SERPL-MCNC: 37.5 MG/DL — HIGH (ref 8–20)
CALCIUM SERPL-MCNC: 9 MG/DL — SIGNIFICANT CHANGE UP (ref 8.4–10.5)
CHLORIDE SERPL-SCNC: 97 MMOL/L — SIGNIFICANT CHANGE UP (ref 96–108)
CO2 SERPL-SCNC: 22 MMOL/L — SIGNIFICANT CHANGE UP (ref 22–29)
CREAT SERPL-MCNC: 1.8 MG/DL — HIGH (ref 0.5–1.3)
EGFR: 30 ML/MIN/1.73M2 — LOW
GLUCOSE BLDC GLUCOMTR-MCNC: 117 MG/DL — HIGH (ref 70–99)
GLUCOSE BLDC GLUCOMTR-MCNC: 148 MG/DL — HIGH (ref 70–99)
GLUCOSE BLDC GLUCOMTR-MCNC: 149 MG/DL — HIGH (ref 70–99)
GLUCOSE BLDC GLUCOMTR-MCNC: 55 MG/DL — LOW (ref 70–99)
GLUCOSE BLDC GLUCOMTR-MCNC: 81 MG/DL — SIGNIFICANT CHANGE UP (ref 70–99)
GLUCOSE BLDC GLUCOMTR-MCNC: 90 MG/DL — SIGNIFICANT CHANGE UP (ref 70–99)
GLUCOSE SERPL-MCNC: 95 MG/DL — SIGNIFICANT CHANGE UP (ref 70–99)
POTASSIUM SERPL-MCNC: 5.2 MMOL/L — SIGNIFICANT CHANGE UP (ref 3.5–5.3)
POTASSIUM SERPL-SCNC: 5.2 MMOL/L — SIGNIFICANT CHANGE UP (ref 3.5–5.3)
RAPID RVP RESULT: SIGNIFICANT CHANGE UP
SARS-COV-2 RNA SPEC QL NAA+PROBE: SIGNIFICANT CHANGE UP
SODIUM SERPL-SCNC: 137 MMOL/L — SIGNIFICANT CHANGE UP (ref 135–145)
TROPONIN T, HIGH SENSITIVITY RESULT: 60 NG/L — HIGH (ref 0–51)
TROPONIN T, HIGH SENSITIVITY RESULT: 66 NG/L — HIGH (ref 0–51)

## 2025-01-08 PROCEDURE — 99233 SBSQ HOSP IP/OBS HIGH 50: CPT

## 2025-01-08 PROCEDURE — 99232 SBSQ HOSP IP/OBS MODERATE 35: CPT

## 2025-01-08 PROCEDURE — 99222 1ST HOSP IP/OBS MODERATE 55: CPT

## 2025-01-08 PROCEDURE — 71045 X-RAY EXAM CHEST 1 VIEW: CPT | Mod: 26

## 2025-01-08 PROCEDURE — 93010 ELECTROCARDIOGRAM REPORT: CPT | Mod: 77

## 2025-01-08 PROCEDURE — 93010 ELECTROCARDIOGRAM REPORT: CPT

## 2025-01-08 RX ORDER — CEFDINIR 300 MG/1
300 CAPSULE ORAL TWICE DAILY
Qty: 20 | Refills: 0 | Status: ACTIVE | COMMUNITY
Start: 2025-01-08 | End: 1900-01-01

## 2025-01-08 RX ADMIN — HEPARIN SODIUM 5000 UNIT(S): 1000 INJECTION, SOLUTION INTRAVENOUS; SUBCUTANEOUS at 15:35

## 2025-01-08 RX ADMIN — SODIUM BICARBONATE 650 MILLIGRAM(S): 84 INJECTION, SOLUTION INTRAVENOUS at 05:52

## 2025-01-08 RX ADMIN — Medication 100 MILLIGRAM(S): at 05:53

## 2025-01-08 RX ADMIN — Medication 81 MILLIGRAM(S): at 09:27

## 2025-01-08 RX ADMIN — HEPARIN SODIUM 5000 UNIT(S): 1000 INJECTION, SOLUTION INTRAVENOUS; SUBCUTANEOUS at 21:48

## 2025-01-08 RX ADMIN — Medication 10 MILLIGRAM(S): at 05:53

## 2025-01-08 RX ADMIN — SODIUM BICARBONATE 650 MILLIGRAM(S): 84 INJECTION, SOLUTION INTRAVENOUS at 17:14

## 2025-01-08 RX ADMIN — Medication 40 MILLIGRAM(S): at 05:52

## 2025-01-08 RX ADMIN — INSULIN GLARGINE-YFGN 26 UNIT(S): 100 INJECTION, SOLUTION SUBCUTANEOUS at 21:49

## 2025-01-08 RX ADMIN — RANOLAZINE 500 MILLIGRAM(S): 1000 TABLET, FILM COATED, EXTENDED RELEASE ORAL at 17:14

## 2025-01-08 RX ADMIN — GABAPENTIN 300 MILLIGRAM(S): 300 CAPSULE ORAL at 21:48

## 2025-01-08 RX ADMIN — RANOLAZINE 500 MILLIGRAM(S): 1000 TABLET, FILM COATED, EXTENDED RELEASE ORAL at 05:52

## 2025-01-08 RX ADMIN — ATORVASTATIN CALCIUM 80 MILLIGRAM(S): 40 TABLET, FILM COATED ORAL at 21:48

## 2025-01-08 RX ADMIN — ACETAMINOPHEN 650 MILLIGRAM(S): 80 SOLUTION/ DROPS ORAL at 15:34

## 2025-01-08 RX ADMIN — HEPARIN SODIUM 5000 UNIT(S): 1000 INJECTION, SOLUTION INTRAVENOUS; SUBCUTANEOUS at 05:52

## 2025-01-08 RX ADMIN — Medication 60 MILLIGRAM(S): at 09:28

## 2025-01-08 RX ADMIN — ACETAMINOPHEN 650 MILLIGRAM(S): 80 SOLUTION/ DROPS ORAL at 01:59

## 2025-01-08 RX ADMIN — CLOPIDOGREL BISULFATE 75 MILLIGRAM(S): 75 TABLET, FILM COATED ORAL at 09:28

## 2025-01-08 RX ADMIN — ACETAMINOPHEN 650 MILLIGRAM(S): 80 SOLUTION/ DROPS ORAL at 17:11

## 2025-01-08 RX ADMIN — INSULIN GLARGINE-YFGN 26 UNIT(S): 100 INJECTION, SOLUTION SUBCUTANEOUS at 01:58

## 2025-01-08 RX ADMIN — Medication 325 MILLIGRAM(S): at 09:28

## 2025-01-08 NOTE — PROGRESS NOTE ADULT - ASSESSMENT
74 F with PMHX CAD s/p PCI, HFrEF, Mobitz II s/p PPM (MIRNA Dawn), PAD with chronic right LE wound, CVA, DM2, CKD3, HTN presents to [facility] ER c/o shortness of breath/RAMIREZ and worsening LE edema.    #Acute on Chronic Heart Failure  - CardioMEMS interrogated and showing PAP 21-24mmHg (bad waveform)  - Patient saturating well on room air  - Chest x-ray reviewed and not grossly fluid overloaded (compared to previous admission)  - No pitting edema observed on physical exam  - BNP 40k  - Elevated trop from HF and poor renal clearance  - Cr 1.8 (last admission 1.3) - signs of prerenal azotemia  - Nephrology consult recommended  - C/w IV lasix 40mg BID   - Will follow along    INCOMPLETE   74 F with PMHX CAD s/p PCI, HFrEF, Mobitz II s/p PPM (MIRNA Dawn), PAD with chronic right LE wound, CVA, DM2, CKD3, HTN presents to [facility] ER c/o shortness of breath/RAMIREZ and worsening LE edema.    #Acute on Chronic Heart Failure  - CardioMEMS interrogated and showing PAP 21-24mmHg (bad waveform)  - Patient saturating well on room air  - Chest x-ray reviewed and not grossly fluid overloaded (compared to previous admission)  - No pitting edema observed on physical exam  - BNP 40k  - Elevated trop from HF and poor renal clearance  - Cr 1.8 (last admission 1.3) - signs of prerenal azotemia  - Nephrology consult recommended  - C/w IV lasix 40mg BID   - Will follow along

## 2025-01-08 NOTE — PROGRESS NOTE ADULT - NS ATTEND AMEND GEN_ALL_CORE FT
acute on chronic heart failure exacerbation    son, daughter at bedside, another daughter who is Health care proxy over phone during evaluation    ongoing need for IV diuresis and inpatient management    will eventually required Mems recalibration  recommend nephrology evaluation    c/w current management acute on chronic heart failure exacerbation    diuresing well with IV diuresis  creatinine improving  discussed with granddaughter    will need optimization of GDMT    ongoing CHF care inpatient  patient agrees

## 2025-01-08 NOTE — PATIENT PROFILE ADULT - FALL HARM RISK - HARM RISK INTERVENTIONS

## 2025-01-08 NOTE — PROGRESS NOTE ADULT - SUBJECTIVE AND OBJECTIVE BOX
[facility]                                                         CARDIOLOGY AT [facility]                                                                  [address] [facility], [facility] [id number] [place] [address]                                                         Telephone: [phone]. Fax: [phone]                                                                             PROGRESS NOTE    Reason for follow up: HF exacerbation  Update: Patient was seen and examined by bedside. No acute events over night. VS and labs reviewed. Patient stating that her breathing has been improving. Patient denies chest pain, shortness of breath, abdominal pain.        Review of symptoms:   Cardiac:  No chest pain. No dyspnea. No palpitations.  Respiratory: no cough. No dyspnea  Gastrointestinal: No diarrhea. No abdominal pain. No bleeding.   Neuro: No focal neuro complaints.    Vitals:  T(C): 36.3 (01-08-25 @ 07:43), Max: 36.4 (01-07-25 @ 14:24)  HR: 63 (01-08-25 @ 07:43) (62 - 70)  BP: 143/56 (01-08-25 @ 07:43) (119/74 - 143/56)  RR: 18 (01-08-25 @ 07:43) (18 - 19)  SpO2: 94% (01-08-25 @ 07:43) (92% - 98%)  Wt(kg): --  I&O's Summary    Weight (kg): 73.5 (01-07 @ 14:24)    PHYSICAL EXAM:  Appearance: Comfortable. No acute distress  HEENT: Atraumatic. Normocephalic.  Normal oral mucosa  Neurologic: A & O x 3, no gross focal deficits.  Cardiovascular: RRR S1 S2, No murmur, no rubs/gallops. No JVD  Respiratory: Lungs clear to auscultation, unlabored   Gastrointestinal: Soft, Non-tender, + BS  Lower Extremities: 2+ Peripheral Pulses, No clubbing, cyanosis, or edema  Psychiatry: Patient is calm. No agitation.   Skin: Warm and dry.    CURRENT CARDIAC MEDICATIONS:  amLODIPine   Tablet 10 milliGRAM(s) Oral daily  furosemide   Injectable 40 milliGRAM(s) IV Push daily  isosorbide   mononitrate ER Tablet (IMDUR) 60 milliGRAM(s) Oral daily  metoprolol succinate  milliGRAM(s) Oral daily  ranolazine 500 milliGRAM(s) Oral two times a day      CURRENT OTHER MEDICATIONS:  acetaminophen     Tablet .. 650 milliGRAM(s) Oral every 6 hours PRN Temp greater or equal to 38C (100.4F), Mild Pain (1 - 3)  gabapentin 300 milliGRAM(s) Oral at bedtime  melatonin 3 milliGRAM(s) Oral at bedtime PRN Insomnia  ondansetron Injectable 4 milliGRAM(s) IV Push every 8 hours PRN Nausea and/or Vomiting  aluminum hydroxide/magnesium hydroxide/simethicone Suspension 30 milliLiter(s) Oral every 4 hours PRN Dyspepsia  atorvastatin 80 milliGRAM(s) Oral at bedtime  dextrose 50% Injectable 25 Gram(s) IV Push once, Stop order after: 1 Doses  dextrose 50% Injectable 12.5 Gram(s) IV Push once, Stop order after: 1 Doses  dextrose 50% Injectable 25 Gram(s) IV Push once, Stop order after: 1 Doses  dextrose Oral Gel 15 Gram(s) Oral once, Stop order after: 1 Doses PRN Blood Glucose LESS THAN 70 milliGRAM(s)/deciliter  glucagon  Injectable 1 milliGRAM(s) IntraMuscular once, Stop order after: 1 Doses  insulin glargine Injectable (LANTUS) 26 Unit(s) SubCutaneous at bedtime  insulin lispro (ADMELOG) corrective regimen sliding scale   SubCutaneous three times a day before meals  insulin lispro (ADMELOG) corrective regimen sliding scale   SubCutaneous at bedtime  aspirin enteric coated 81 milliGRAM(s) Oral daily  clopidogrel Tablet 75 milliGRAM(s) Oral daily  dextrose 5%. 1000 milliLiter(s) (50 mL/Hr) IV Continuous <Continuous>  dextrose 5%. 1000 milliLiter(s) (100 mL/Hr) IV Continuous <Continuous>  ferrous    sulfate 325 milliGRAM(s) Oral daily  heparin   Injectable 5000 Unit(s) SubCutaneous every 8 hours  sodium bicarbonate 650 milliGRAM(s) Oral two times a day      LABS:	 	                            8.7    8.42  )-----------( 412      ( 07 Jan 2025 17:16 )             26.8     01-08    137  |  97  |  37.5[H]  ----------------------------<  95  5.2   |  22.0  |  1.80[H]    Ca    9.0      08 Jan 2025 02:15    TPro  7.1  /  Alb  3.8  /  TBili  0.3[L]  /  DBili  x   /  AST  24  /  ALT  24  /  AlkPhos  108  01-07      Lipid Profile:   HgA1c:   TSH:     TELEMETRY:   ECG:    DIAGNOSTIC TESTING:  [ ] Echocardiogram:   [ ] Catheterization:  [ ] Stress Test:    OTHER:

## 2025-01-08 NOTE — ED ADULT NURSE REASSESSMENT NOTE - NS ED NURSE REASSESS COMMENT FT1
Report given to CDU RN. Pt moved to CDU _2L_______ . Pt placed on telebox, US guided IV placed. Patient awake and alert, respirations even and unlabored, in no apparent distress.

## 2025-01-08 NOTE — PROGRESS NOTE ADULT - ASSESSMENT
75 y/o female with PMH of HTN, HLD, CAD s/p PCI, HFrEF, Mobitz II s/p PPM (MD NereydaT), PAD with chronic right LE wound, CVA, DM2, CKD3 came to the ED complaining of shortness of breath and fatigue x 4 days. Patient endorsed orthopnea, burning in her chest.     Acute on chronic CHFrE   Telemetry monitoring  Troponin: 62-->62  ProBNP: 44455  IV Lasix 40mg daily  Cardiology recs appreciated  Saturating well on room air    HTN/HLD/CAD   Amlodipine 10mg   Aspirin 81mg   Plavix 75mg   Atorvastatin 80mg   Metoprolol ER 100mg   Ranolazine Er 500mg bid   Imdur 60mg     DM-2   Hold Metformin 1000mg bid   Lantus 26 units HS   FS with ISS    CKD-3   Cr. 1.80  Monitor renal function     Anemia   Likely of chronic disease   Hb stable   Iron tab (as per granddaughter, she now only takes it daily instead of tid)   Monitor CBC    DVT ppx  - Heparin SQ

## 2025-01-08 NOTE — CHART NOTE - NSCHARTNOTEFT_GEN_A_CORE
Pt c/o chest pressure with deep breathes  Pt A+OX3 cooperative NAD  Breathing easy and unlabored  Pt appears comfortable  VSS B/P 127/57 P 93 R 18 PO 93% T 98.3  Heart RR  Lung clear  EKG V paced R 66  Troponin  RVP  CXR  Hemodynamically stable NAD  Continue to monitor Pt c/o chest pressure with deep breaths  Pt A+OX3 cooperative NAD  Breathing easy and unlabored  Pt appears comfortable  VSS B/P 127/57 P 93 R 18 PO 93% T 98.3  Heart RR  Lung clear  EKG V paced R 66  Troponin  RVP  CXR  Hemodynamically stable NAD  Continue to monitor Pt c/o chest pressure with deep breaths  Pt A+OX3 cooperative NAD  Breathing easy and unlabored  Pt appears comfortable  VSS B/P 127/57 P 93 R 18 PO 93% T 98.3  Heart RR  Lung clear  EKG V paced R 66  Troponin 66, unchanged  RVP  CXR  Hemodynamically stable NAD  Continue to monitor

## 2025-01-08 NOTE — PROGRESS NOTE ADULT - SUBJECTIVE AND OBJECTIVE BOX
Chief complaint: CHF    Patient seen and examined at bedside. No acute overnight events reported. No fever, chills, nausea or vomiting.    Vital Signs Last 24 Hrs  T(F): 97.4 (08 Jan 2025 07:43), Max: 97.6 (07 Jan 2025 14:24)  HR: 63 (08 Jan 2025 07:43) (62 - 70)  BP: 143/56 (08 Jan 2025 07:43) (119/74 - 143/56)  RR: 18 (08 Jan 2025 07:43) (18 - 19)  SpO2: 94% (08 Jan 2025 07:43) (92% - 98%)    Physical Exam:  Constitutional: alert and oriented, in no acute distress   Neck: Soft and supple  Respiratory: Good air entry b/l  Cardiovascular: Regular rate and rhyhtm, no murmurs, gallops, rubs  Gastrointestinal: Soft, non-tender to palpation, +bs  Vascular: 2+ peripheral pulses  Musculoskeletal:  no lower extremity edema bilaterally    Labs:                        8.7    8.42  )-----------( 412      ( 07 Jan 2025 17:16 )             26.8   01-08    137  |  97  |  37.5[H]  ----------------------------<  95  5.2   |  22.0  |  1.80[H]    Ca    9.0      08 Jan 2025 02:15    TPro  7.1  /  Alb  3.8  /  TBili  0.3[L]  /  DBili  x   /  AST  24  /  ALT  24  /  AlkPhos  108  01-07

## 2025-01-09 LAB
ANION GAP SERPL CALC-SCNC: 13 MMOL/L — SIGNIFICANT CHANGE UP (ref 5–17)
BUN SERPL-MCNC: 26.1 MG/DL — HIGH (ref 8–20)
CALCIUM SERPL-MCNC: 8.9 MG/DL — SIGNIFICANT CHANGE UP (ref 8.4–10.5)
CHLORIDE SERPL-SCNC: 100 MMOL/L — SIGNIFICANT CHANGE UP (ref 96–108)
CO2 SERPL-SCNC: 25 MMOL/L — SIGNIFICANT CHANGE UP (ref 22–29)
CREAT SERPL-MCNC: 1.55 MG/DL — HIGH (ref 0.5–1.3)
EGFR: 35 ML/MIN/1.73M2 — LOW
GLUCOSE BLDC GLUCOMTR-MCNC: 122 MG/DL — HIGH (ref 70–99)
GLUCOSE BLDC GLUCOMTR-MCNC: 150 MG/DL — HIGH (ref 70–99)
GLUCOSE BLDC GLUCOMTR-MCNC: 260 MG/DL — HIGH (ref 70–99)
GLUCOSE BLDC GLUCOMTR-MCNC: 71 MG/DL — SIGNIFICANT CHANGE UP (ref 70–99)
GLUCOSE SERPL-MCNC: 89 MG/DL — SIGNIFICANT CHANGE UP (ref 70–99)
HCT VFR BLD CALC: 30 % — LOW (ref 34.5–45)
HGB BLD-MCNC: 9.7 G/DL — LOW (ref 11.5–15.5)
MCHC RBC-ENTMCNC: 27.8 PG — SIGNIFICANT CHANGE UP (ref 27–34)
MCHC RBC-ENTMCNC: 32.3 G/DL — SIGNIFICANT CHANGE UP (ref 32–36)
MCV RBC AUTO: 86 FL — SIGNIFICANT CHANGE UP (ref 80–100)
NT-PROBNP SERPL-SCNC: HIGH PG/ML (ref 0–300)
PLATELET # BLD AUTO: 465 K/UL — HIGH (ref 150–400)
POTASSIUM SERPL-MCNC: 4 MMOL/L — SIGNIFICANT CHANGE UP (ref 3.5–5.3)
POTASSIUM SERPL-SCNC: 4 MMOL/L — SIGNIFICANT CHANGE UP (ref 3.5–5.3)
RBC # BLD: 3.49 M/UL — LOW (ref 3.8–5.2)
RBC # FLD: 19.6 % — HIGH (ref 10.3–14.5)
SODIUM SERPL-SCNC: 138 MMOL/L — SIGNIFICANT CHANGE UP (ref 135–145)
WBC # BLD: 9.05 K/UL — SIGNIFICANT CHANGE UP (ref 3.8–10.5)
WBC # FLD AUTO: 9.05 K/UL — SIGNIFICANT CHANGE UP (ref 3.8–10.5)

## 2025-01-09 PROCEDURE — 99233 SBSQ HOSP IP/OBS HIGH 50: CPT

## 2025-01-09 PROCEDURE — 93279 PRGRMG DEV EVAL PM/LDLS PM: CPT | Mod: 26

## 2025-01-09 PROCEDURE — 99234 HOSP IP/OBS SM DT SF/LOW 45: CPT

## 2025-01-09 RX ORDER — FUROSEMIDE 20 MG
40 TABLET ORAL
Refills: 0 | Status: DISCONTINUED | OUTPATIENT
Start: 2025-01-09 | End: 2025-01-11

## 2025-01-09 RX ORDER — SACUBITRIL AND VALSARTAN 24; 26 MG/1; MG/1
1 TABLET, FILM COATED ORAL
Refills: 0 | Status: DISCONTINUED | OUTPATIENT
Start: 2025-01-09 | End: 2025-01-14

## 2025-01-09 RX ORDER — PANTOPRAZOLE 40 MG/1
1 TABLET, DELAYED RELEASE ORAL
Refills: 0 | DISCHARGE

## 2025-01-09 RX ADMIN — RANOLAZINE 500 MILLIGRAM(S): 1000 TABLET, FILM COATED, EXTENDED RELEASE ORAL at 16:46

## 2025-01-09 RX ADMIN — Medication 10 MILLIGRAM(S): at 06:08

## 2025-01-09 RX ADMIN — Medication 1: at 21:50

## 2025-01-09 RX ADMIN — ACETAMINOPHEN 650 MILLIGRAM(S): 80 SOLUTION/ DROPS ORAL at 10:41

## 2025-01-09 RX ADMIN — GABAPENTIN 300 MILLIGRAM(S): 300 CAPSULE ORAL at 21:48

## 2025-01-09 RX ADMIN — INSULIN GLARGINE-YFGN 26 UNIT(S): 100 INJECTION, SOLUTION SUBCUTANEOUS at 21:49

## 2025-01-09 RX ADMIN — Medication 40 MILLIGRAM(S): at 18:11

## 2025-01-09 RX ADMIN — Medication 60 MILLIGRAM(S): at 09:41

## 2025-01-09 RX ADMIN — RANOLAZINE 500 MILLIGRAM(S): 1000 TABLET, FILM COATED, EXTENDED RELEASE ORAL at 06:07

## 2025-01-09 RX ADMIN — Medication 325 MILLIGRAM(S): at 09:40

## 2025-01-09 RX ADMIN — SODIUM BICARBONATE 650 MILLIGRAM(S): 84 INJECTION, SOLUTION INTRAVENOUS at 06:07

## 2025-01-09 RX ADMIN — SACUBITRIL AND VALSARTAN 1 TABLET(S): 24; 26 TABLET, FILM COATED ORAL at 16:46

## 2025-01-09 RX ADMIN — HEPARIN SODIUM 5000 UNIT(S): 1000 INJECTION, SOLUTION INTRAVENOUS; SUBCUTANEOUS at 14:59

## 2025-01-09 RX ADMIN — ATORVASTATIN CALCIUM 80 MILLIGRAM(S): 40 TABLET, FILM COATED ORAL at 21:49

## 2025-01-09 RX ADMIN — CLOPIDOGREL BISULFATE 75 MILLIGRAM(S): 75 TABLET, FILM COATED ORAL at 09:40

## 2025-01-09 RX ADMIN — ACETAMINOPHEN 650 MILLIGRAM(S): 80 SOLUTION/ DROPS ORAL at 09:41

## 2025-01-09 RX ADMIN — Medication 100 MILLIGRAM(S): at 06:07

## 2025-01-09 RX ADMIN — HEPARIN SODIUM 5000 UNIT(S): 1000 INJECTION, SOLUTION INTRAVENOUS; SUBCUTANEOUS at 21:48

## 2025-01-09 RX ADMIN — Medication 40 MILLIGRAM(S): at 06:07

## 2025-01-09 RX ADMIN — SODIUM BICARBONATE 650 MILLIGRAM(S): 84 INJECTION, SOLUTION INTRAVENOUS at 18:11

## 2025-01-09 RX ADMIN — HEPARIN SODIUM 5000 UNIT(S): 1000 INJECTION, SOLUTION INTRAVENOUS; SUBCUTANEOUS at 06:07

## 2025-01-09 RX ADMIN — Medication 81 MILLIGRAM(S): at 09:40

## 2025-01-09 NOTE — PROGRESS NOTE ADULT - SUBJECTIVE AND OBJECTIVE BOX
[facility]                                                               Office: [address] [address]                                                              Telephone: [phone]. Fax:[phone]                                                                             PROGRESS NOTE  Reason for follow up: Acute on chronic HFrEF, ICM  Overnight: No new events.   Update: no strict I/O recorded in CDU, remains on IV Lasix 40mg once daily, repeat Mems reading today still 24-25 (implant PAD of 17), family at bedside reports she has been adherent with diuretic use at home and fluid restrict <1.5 liters      Review of symptoms:   Cardiac:  No chest pain. No dyspnea. No palpitations.  Respiratory: No cough. No dyspnea  Gastrointestinal: No diarrhea. No abdominal pain. No bleeding.     Past medical history: No updates.   	  Vital Signs Last 24 Hrs  T(C): 36.6 (01-09-25 @ 15:52), Max: 36.8 (01-08-25 @ 19:13)  T(F): 97.9 (01-09-25 @ 15:52), Max: 98.3 (01-08-25 @ 19:13)  HR: 60 (01-09-25 @ 15:52) (60 - 97)  BP: 137/69 (01-09-25 @ 15:52) (125/62 - 147/74)  BP(mean): 83 (01-09-25 @ 11:19) (80 - 100)  RR: 18 (01-09-25 @ 15:52) (18 - 18)  SpO2: 92% (01-09-25 @ 15:52) (92% - 97%)  I&O's Summary    08 Jan 2025 07:01  -  09 Jan 2025 07:00  --------------------------------------------------------  IN: 0 mL / OUT: 700 mL / NET: -700 mL          PHYSICAL EXAM:  Appearance: Comfortable. No acute distress  HEENT:  Head and neck: Atraumatic. Normocephalic.  Normal oral mucosa, PERRL, Neck is supple. No JVD, No carotid bruit.   Neurologic: A&Ox 3, no focal deficits. EOMI, Cranial nerves are intact.  Lymphatic: No cervical lymphadenopathy  Cardiovascular: Normal S1 S2, No murmur, rubs/gallops. No JVD, No edema  Respiratory: Lungs clear to auscultation  Gastrointestinal:  Soft, Non-tender, + BS  Lower Extremities: No edema  Psychiatry: Patient is calm. No agitation. Mood & affect appropriate  Skin: No rashes/ecchymoses/cyanosis/ulcers visualized on the face, hands or feet.      CURRENT MEDICATIONS:  MEDICATIONS  (STANDING):  aspirin enteric coated 81 milliGRAM(s) Oral daily  atorvastatin 80 milliGRAM(s) Oral at bedtime  clopidogrel Tablet 75 milliGRAM(s) Oral daily  dextrose 5%. 1000 milliLiter(s) (50 mL/Hr) IV Continuous <Continuous>  dextrose 5%. 1000 milliLiter(s) (100 mL/Hr) IV Continuous <Continuous>  dextrose 50% Injectable 25 Gram(s) IV Push once  dextrose 50% Injectable 12.5 Gram(s) IV Push once  dextrose 50% Injectable 25 Gram(s) IV Push once  ferrous    sulfate 325 milliGRAM(s) Oral daily  furosemide   Injectable 40 milliGRAM(s) IV Push daily  gabapentin 300 milliGRAM(s) Oral at bedtime  glucagon  Injectable 1 milliGRAM(s) IntraMuscular once  heparin   Injectable 5000 Unit(s) SubCutaneous every 8 hours  insulin glargine Injectable (LANTUS) 26 Unit(s) SubCutaneous at bedtime  insulin lispro (ADMELOG) corrective regimen sliding scale   SubCutaneous three times a day before meals  insulin lispro (ADMELOG) corrective regimen sliding scale   SubCutaneous at bedtime  isosorbide   mononitrate ER Tablet (IMDUR) 60 milliGRAM(s) Oral daily  metoprolol succinate  milliGRAM(s) Oral daily  ranolazine 500 milliGRAM(s) Oral two times a day  sacubitril 49 mG/valsartan 51 mG 1 Tablet(s) Oral two times a day  sodium bicarbonate 650 milliGRAM(s) Oral two times a day    MEDICATIONS  (PRN):  acetaminophen     Tablet .. 650 milliGRAM(s) Oral every 6 hours PRN Temp greater or equal to 38C (100.4F), Mild Pain (1 - 3)  aluminum hydroxide/magnesium hydroxide/simethicone Suspension 30 milliLiter(s) Oral every 4 hours PRN Dyspepsia  dextrose Oral Gel 15 Gram(s) Oral once PRN Blood Glucose LESS THAN 70 milliGRAM(s)/deciliter  melatonin 3 milliGRAM(s) Oral at bedtime PRN Insomnia  ondansetron Injectable 4 milliGRAM(s) IV Push every 8 hours PRN Nausea and/or Vomiting      DIAGNOSTIC TESTING:  [ ] Echocardiogram:   [ ]  Catheterization:  [ ] Stress Test:      Labs:                        9.7    9.05  )-----------( 465      ( 09 Jan 2025 10:15 )             30.0     01-09    138  |  100  |  26.1[H]  ----------------------------<  89  4.0   |  25.0  |  1.55[H]    Ca    8.9      09 Jan 2025 10:15    TPro  7.1  /  Alb  3.8  /  TBili  0.3[L]  /  DBili  x   /  AST  24  /  ALT  24  /  AlkPhos  108  01-07        Serum Pro-Brain Natriuretic Peptide: 339 pg/mL (11-24-21 @ 14:57)  Serum Pro-Brain Natriuretic Peptide: 543 pg/mL (02-04-21 @ 11:11)      A1C with Estimated Average Glucose Result: 7.1 % (10-09-24 @ 04:43)      TELEMETRY: Reviewed    ECG:  Reviewed by me.

## 2025-01-09 NOTE — PROGRESS NOTE ADULT - ASSESSMENT
73 y/o female with PMH of HTN, HLD, CAD s/p PCI, HFrEF, Mobitz II s/p PPM (MD NereydaT), PAD with chronic right LE wound, CVA, DM2, CKD3 came to the ED complaining of shortness of breath and fatigue x 4 days. Patient endorsed orthopnea, burning in her chest.     Acute on chronic CHFrE   Telemetry monitoring  Troponin: 62-->62  ProBNP: 24590  IV Lasix 40mg daily  Cardiology recs appreciated  Saturating well on room air    HTN/HLD/CAD   Amlodipine 10mg   Aspirin 81mg   Plavix 75mg   Atorvastatin 80mg   Metoprolol ER 100mg   Ranolazine Er 500mg bid   Imdur 60mg     DM-2   Hold Metformin 1000mg bid   Lantus 26 units HS   FS with ISS    CKD-3   Cr 1.55  Monitor renal function     Anemia   Likely of chronic disease   Hb stable   Iron tab (as per granddaughter, she now only takes it daily instead of tid)   Monitor CBC    DVT ppx  - Heparin SQ

## 2025-01-09 NOTE — PROCEDURE NOTE - ADDITIONAL PROCEDURE DETAILS
DOI 4/10/2020 by Dr. Montgomery    %Pacing:  AM-VS <0.1%  VS only <0.1%  AM- 63.7%   only 36.3%    Device with stable threshold and independence. Functioning within normal limits.      Patient with 100% RV pacing since 2023. Pharmacological stress testing performed on 6/6/2024 revealed a a Normal myocardial perfusion scan, with no evidence of infarction or inducible ischemia, with a LVEF of 62%. LHC performed on 11/19/24 revealed "multivessel CAD with critical in-stent restenosis (up to 99%) of the RCA. Stable 95% sequential lesions in both limbs of large branching OM1. Diffuse severe (up to 95%) stenosis of the mid-apical LAD.". For which no intervention was performed and medical management was recommended. Most recent TTE on 12/5/24 revealed a LVEF of 35-40%. Unsure if cardiomyopathy correlates with 100% pacing burden as pt with preserved LVEF in the past with elevated pacing burden. Suggest evaluation of pts multivessel disease prior to consideration of any potential device upgrade. Can follow up with Dr. Morse at time of discharge for further device management.       Discussed with Dr. Morse

## 2025-01-09 NOTE — PROCEDURE NOTE - SUPERVISORY STATEMENT
Patient had prolonged period of high RV pacing burden without any decline in EF previously suggesting cardiomyopathy is unlikely pacing related and upgrade to a CRT device is unlikely to be beneficial especially given setting of urevascularized severe multivessel CAD which is the likely culprit for her LV dysfunction.

## 2025-01-09 NOTE — PHARMACOTHERAPY INTERVENTION NOTE - COMMENTS
Referenced outpatient medical fill records and spoke with outpatient pharmacy to obtain medication list.

## 2025-01-09 NOTE — PROGRESS NOTE ADULT - ASSESSMENT
74F Taiwanese-speaking ( # [id number]) history significant for HTN, DM2, CKD 3, heart block s/p Micra PPM, CVA, CAD/stent, PAD, CVA, HFmrEF with recurrent hospitalization for HF in 10/8-10/14 and 11/16-11/21/24 underwent repeat LHC on 11/19/24 with critical ISR of RCA stent 99% and 95% OM1 with 95% [id number] LAD no PCI perform yet for optimization of CHF, discharged on Lasix 40mg once daily with low dose Entresto had mild BRADY, interval with worsening dyspnea despite extra Lasix use prompted return to the ER and readmitted for ADHF, pBNP 22K (prior 16K) in 12/3/24, repeat Echo with LV EF 39%, subsequently underwent CardioMems implant 12/9 (PAD 17 and PCWP 11) was discharged on 12/20/24 with Lasix 40mg once daily but with fluid overload prompted readmission 1/7/25 with pBNP 40K      -still overloaded will increase IV Lasix to 40mg BID, recheck Mems reading tomorrow   -discussed with interventionalists given recurrent ADHF admissions to consider PCI attempt of the RCA, will continue to optimize for next week  -continue DAPT on clopidogrel, high dose statin   -switch amlodipine to Entresto for GDMT, eventual SGLT2i, on metoprolol succinate 100mg   -EP eval. for CRT upgrade given chronic RV pacing 100% now with LV EF <50% and recurrent HF    -updated patient's  and daughters at the bedside       Mendoza Pham DO, [facility]  Faculty Non-Invasive Cardiologist  [phone].

## 2025-01-09 NOTE — PROGRESS NOTE ADULT - SUBJECTIVE AND OBJECTIVE BOX
chief complaint: CHF    Patient seen and examined at bedside. No acute overnight events reported. No fever, cough or shortness of breath. Complaining of right foot pain.     Vital Signs Last 24 Hrs  T(F): 97.8 (09 Jan 2025 11:19), Max: 98.3 (08 Jan 2025 19:13)  HR: 62 (09 Jan 2025 11:19) (62 - 97)  BP: 125/62 (09 Jan 2025 11:19) (125/62 - 147/74)  RR: 18 (09 Jan 2025 11:19) (18 - 18)  SpO2: 92% (09 Jan 2025 11:19) (92% - 97%)    Physical Exam:  Constitutional: alert and oriented, in no acute distress   Neck: Soft and supple  Respiratory: Good air entry b/l  Cardiovascular: Regular rate and rhyhtm  Gastrointestinal: Soft, non-tender to palpation, +bs  Vascular: 2+ peripheral pulses  Neurological: A/O x 3  Musculoskeletal: 5/5 strength b/l upper and lower extremities    Labs:                        9.7    9.05  )-----------( 465      ( 09 Jan 2025 10:15 )             30.0

## 2025-01-10 LAB
ANION GAP SERPL CALC-SCNC: 12 MMOL/L — SIGNIFICANT CHANGE UP (ref 5–17)
BASOPHILS # BLD AUTO: 0.08 K/UL — SIGNIFICANT CHANGE UP (ref 0–0.2)
BASOPHILS NFR BLD AUTO: 0.9 % — SIGNIFICANT CHANGE UP (ref 0–2)
BUN SERPL-MCNC: 22.2 MG/DL — HIGH (ref 8–20)
CALCIUM SERPL-MCNC: 8.2 MG/DL — LOW (ref 8.4–10.5)
CHLORIDE SERPL-SCNC: 105 MMOL/L — SIGNIFICANT CHANGE UP (ref 96–108)
CO2 SERPL-SCNC: 24 MMOL/L — SIGNIFICANT CHANGE UP (ref 22–29)
CREAT SERPL-MCNC: 1.52 MG/DL — HIGH (ref 0.5–1.3)
EGFR: 36 ML/MIN/1.73M2 — LOW
EOSINOPHIL # BLD AUTO: 0.37 K/UL — SIGNIFICANT CHANGE UP (ref 0–0.5)
EOSINOPHIL NFR BLD AUTO: 4 % — SIGNIFICANT CHANGE UP (ref 0–6)
GLUCOSE BLDC GLUCOMTR-MCNC: 128 MG/DL — HIGH (ref 70–99)
GLUCOSE BLDC GLUCOMTR-MCNC: 170 MG/DL — HIGH (ref 70–99)
GLUCOSE BLDC GLUCOMTR-MCNC: 176 MG/DL — HIGH (ref 70–99)
GLUCOSE BLDC GLUCOMTR-MCNC: 213 MG/DL — HIGH (ref 70–99)
GLUCOSE SERPL-MCNC: 127 MG/DL — HIGH (ref 70–99)
HCT VFR BLD CALC: 29.5 % — LOW (ref 34.5–45)
HGB BLD-MCNC: 9.4 G/DL — LOW (ref 11.5–15.5)
IMM GRANULOCYTES NFR BLD AUTO: 1.3 % — HIGH (ref 0–0.9)
LYMPHOCYTES # BLD AUTO: 1.87 K/UL — SIGNIFICANT CHANGE UP (ref 1–3.3)
LYMPHOCYTES # BLD AUTO: 20 % — SIGNIFICANT CHANGE UP (ref 13–44)
MCHC RBC-ENTMCNC: 27.5 PG — SIGNIFICANT CHANGE UP (ref 27–34)
MCHC RBC-ENTMCNC: 31.9 G/DL — LOW (ref 32–36)
MCV RBC AUTO: 86.3 FL — SIGNIFICANT CHANGE UP (ref 80–100)
MONOCYTES # BLD AUTO: 0.77 K/UL — SIGNIFICANT CHANGE UP (ref 0–0.9)
MONOCYTES NFR BLD AUTO: 8.2 % — SIGNIFICANT CHANGE UP (ref 2–14)
NEUTROPHILS # BLD AUTO: 6.13 K/UL — SIGNIFICANT CHANGE UP (ref 1.8–7.4)
NEUTROPHILS NFR BLD AUTO: 65.6 % — SIGNIFICANT CHANGE UP (ref 43–77)
PLATELET # BLD AUTO: 497 K/UL — HIGH (ref 150–400)
POTASSIUM SERPL-MCNC: 4 MMOL/L — SIGNIFICANT CHANGE UP (ref 3.5–5.3)
POTASSIUM SERPL-SCNC: 4 MMOL/L — SIGNIFICANT CHANGE UP (ref 3.5–5.3)
RBC # BLD: 3.42 M/UL — LOW (ref 3.8–5.2)
RBC # FLD: 19.8 % — HIGH (ref 10.3–14.5)
SODIUM SERPL-SCNC: 141 MMOL/L — SIGNIFICANT CHANGE UP (ref 135–145)
WBC # BLD: 9.34 K/UL — SIGNIFICANT CHANGE UP (ref 3.8–10.5)
WBC # FLD AUTO: 9.34 K/UL — SIGNIFICANT CHANGE UP (ref 3.8–10.5)

## 2025-01-10 PROCEDURE — 99234 HOSP IP/OBS SM DT SF/LOW 45: CPT

## 2025-01-10 PROCEDURE — 99233 SBSQ HOSP IP/OBS HIGH 50: CPT

## 2025-01-10 RX ADMIN — RANOLAZINE 500 MILLIGRAM(S): 1000 TABLET, FILM COATED, EXTENDED RELEASE ORAL at 05:48

## 2025-01-10 RX ADMIN — Medication 81 MILLIGRAM(S): at 15:38

## 2025-01-10 RX ADMIN — ACETAMINOPHEN 650 MILLIGRAM(S): 80 SOLUTION/ DROPS ORAL at 01:20

## 2025-01-10 RX ADMIN — CLOPIDOGREL BISULFATE 75 MILLIGRAM(S): 75 TABLET, FILM COATED ORAL at 15:38

## 2025-01-10 RX ADMIN — ACETAMINOPHEN 650 MILLIGRAM(S): 80 SOLUTION/ DROPS ORAL at 00:38

## 2025-01-10 RX ADMIN — Medication 325 MILLIGRAM(S): at 15:39

## 2025-01-10 RX ADMIN — HEPARIN SODIUM 5000 UNIT(S): 1000 INJECTION, SOLUTION INTRAVENOUS; SUBCUTANEOUS at 15:39

## 2025-01-10 RX ADMIN — ACETAMINOPHEN 650 MILLIGRAM(S): 80 SOLUTION/ DROPS ORAL at 17:29

## 2025-01-10 RX ADMIN — GABAPENTIN 300 MILLIGRAM(S): 300 CAPSULE ORAL at 21:03

## 2025-01-10 RX ADMIN — ATORVASTATIN CALCIUM 80 MILLIGRAM(S): 40 TABLET, FILM COATED ORAL at 21:03

## 2025-01-10 RX ADMIN — INSULIN GLARGINE-YFGN 26 UNIT(S): 100 INJECTION, SOLUTION SUBCUTANEOUS at 21:04

## 2025-01-10 RX ADMIN — SACUBITRIL AND VALSARTAN 1 TABLET(S): 24; 26 TABLET, FILM COATED ORAL at 17:21

## 2025-01-10 RX ADMIN — SACUBITRIL AND VALSARTAN 1 TABLET(S): 24; 26 TABLET, FILM COATED ORAL at 05:48

## 2025-01-10 RX ADMIN — ACETAMINOPHEN 650 MILLIGRAM(S): 80 SOLUTION/ DROPS ORAL at 18:20

## 2025-01-10 RX ADMIN — Medication 40 MILLIGRAM(S): at 15:39

## 2025-01-10 RX ADMIN — Medication 3 MILLIGRAM(S): at 21:03

## 2025-01-10 RX ADMIN — SODIUM BICARBONATE 650 MILLIGRAM(S): 84 INJECTION, SOLUTION INTRAVENOUS at 17:22

## 2025-01-10 RX ADMIN — Medication 60 MILLIGRAM(S): at 15:39

## 2025-01-10 RX ADMIN — HEPARIN SODIUM 5000 UNIT(S): 1000 INJECTION, SOLUTION INTRAVENOUS; SUBCUTANEOUS at 05:48

## 2025-01-10 RX ADMIN — Medication 1: at 09:27

## 2025-01-10 RX ADMIN — Medication 40 MILLIGRAM(S): at 05:47

## 2025-01-10 RX ADMIN — RANOLAZINE 500 MILLIGRAM(S): 1000 TABLET, FILM COATED, EXTENDED RELEASE ORAL at 17:22

## 2025-01-10 RX ADMIN — HEPARIN SODIUM 5000 UNIT(S): 1000 INJECTION, SOLUTION INTRAVENOUS; SUBCUTANEOUS at 21:03

## 2025-01-10 RX ADMIN — SODIUM BICARBONATE 650 MILLIGRAM(S): 84 INJECTION, SOLUTION INTRAVENOUS at 05:48

## 2025-01-10 RX ADMIN — Medication 100 MILLIGRAM(S): at 05:48

## 2025-01-10 RX ADMIN — Medication 1: at 17:22

## 2025-01-10 NOTE — PROGRESS NOTE ADULT - SUBJECTIVE AND OBJECTIVE BOX
[facility]                                                         CARDIOLOGY AT [place] [address]                                                         Telephone: [phone]. Fax: [phone]                                                                             PROGRESS NOTE    Reason for follow up: HF exacerbation  Update: Patient was seen and examined by bedside. No acute events over night. Patient s/p multiple rounds of IV lasix for diuresis. Repeat Mems reading today....Patient pending [facility] for coronary vessel evaluation. Patient denies chest pain, shortness of breath, abdominal pain.        Review of symptoms:   Cardiac:  No chest pain. No dyspnea. No palpitations.  Respiratory: no cough. No dyspnea  Gastrointestinal: No diarrhea. No abdominal pain. No bleeding.   Neuro: No focal neuro complaints.    Vitals:  T(C): 36.7 (01-10-25 @ 04:19), Max: 36.7 (01-09-25 @ 17:47)  HR: 97 (01-10-25 @ 00:51) (60 - 97)  BP: 137/71 (01-10-25 @ 04:19) (125/62 - 165/63)  RR: 17 (01-10-25 @ 00:51) (17 - 19)  SpO2: 88% (01-10-25 @ 00:51) (88% - 93%)  Wt(kg): --  I&O's Summary    09 Jan 2025 07:01  -  10 Jorge 2025 07:00  --------------------------------------------------------  IN: 500 mL / OUT: 1990 mL / NET: -1490 mL      Weight (kg): 73.5 (01-07 @ 14:24)    PHYSICAL EXAM:  Appearance: Comfortable. No acute distress  HEENT:  Head and neck: Atraumatic. Normocephalic.  Normal oral mucosa, PERRL, Neck is supple. No JVD, No carotid bruit.   Neurologic: A&Ox 3, no focal deficits. EOMI, Cranial nerves are intact.  Lymphatic: No cervical lymphadenopathy  Cardiovascular: Normal S1 S2, No murmur, rubs/gallops. No JVD, No edema  Respiratory: Lungs clear to auscultation  Gastrointestinal:  Soft, Non-tender, + BS  Lower Extremities: No edema  Psychiatry: Patient is calm. No agitation. Mood & affect appropriate  Skin: No rashes/ecchymoses/cyanosis/ulcers visualized on the face, hands or feet.    CURRENT CARDIAC MEDICATIONS:  furosemide   Injectable 40 milliGRAM(s) IV Push two times a day  isosorbide   mononitrate ER Tablet (IMDUR) 60 milliGRAM(s) Oral daily  metoprolol succinate  milliGRAM(s) Oral daily  ranolazine 500 milliGRAM(s) Oral two times a day  sacubitril 49 mG/valsartan 51 mG 1 Tablet(s) Oral two times a day      CURRENT OTHER MEDICATIONS:  acetaminophen     Tablet .. 650 milliGRAM(s) Oral every 6 hours PRN Temp greater or equal to 38C (100.4F), Mild Pain (1 - 3)  gabapentin 300 milliGRAM(s) Oral at bedtime  melatonin 3 milliGRAM(s) Oral at bedtime PRN Insomnia  ondansetron Injectable 4 milliGRAM(s) IV Push every 8 hours PRN Nausea and/or Vomiting  aluminum hydroxide/magnesium hydroxide/simethicone Suspension 30 milliLiter(s) Oral every 4 hours PRN Dyspepsia  atorvastatin 80 milliGRAM(s) Oral at bedtime  dextrose 50% Injectable 25 Gram(s) IV Push once, Stop order after: 1 Doses  dextrose 50% Injectable 12.5 Gram(s) IV Push once, Stop order after: 1 Doses  dextrose 50% Injectable 25 Gram(s) IV Push once, Stop order after: 1 Doses  dextrose Oral Gel 15 Gram(s) Oral once, Stop order after: 1 Doses PRN Blood Glucose LESS THAN 70 milliGRAM(s)/deciliter  glucagon  Injectable 1 milliGRAM(s) IntraMuscular once, Stop order after: 1 Doses  insulin glargine Injectable (LANTUS) 26 Unit(s) SubCutaneous at bedtime  insulin lispro (ADMELOG) corrective regimen sliding scale   SubCutaneous three times a day before meals  insulin lispro (ADMELOG) corrective regimen sliding scale   SubCutaneous at bedtime  aspirin enteric coated 81 milliGRAM(s) Oral daily  clopidogrel Tablet 75 milliGRAM(s) Oral daily  dextrose 5%. 1000 milliLiter(s) (50 mL/Hr) IV Continuous <Continuous>  dextrose 5%. 1000 milliLiter(s) (100 mL/Hr) IV Continuous <Continuous>  ferrous    sulfate 325 milliGRAM(s) Oral daily  heparin   Injectable 5000 Unit(s) SubCutaneous every 8 hours  sodium bicarbonate 650 milliGRAM(s) Oral two times a day      LABS:	 	                            9.4    9.34  )-----------( 497      ( 10 Jorge 2025 04:47 )             29.5     01-10    141  |  105  |  22.2[H]  ----------------------------<  127[H]  4.0   |  24.0  |  1.52[H]    Ca    8.2[L]      10 Jorge 2025 04:47        Lipid Profile:   HgA1c:   TSH:     TELEMETRY:   ECG:    DIAGNOSTIC TESTING:  [ ] Echocardiogram:   [ ] Catheterization:  [ ] Stress Test:    OTHER: 	                                                                [facility]                                                         CARDIOLOGY AT [place] [address]                                                         Telephone: [phone]. Fax: [phone]                                                                             PROGRESS NOTE    Reason for follow up: HF exacerbation  Update: Patient was seen and examined by bedside. No acute events over night. Patient s/p multiple rounds of IV lasix for diuresis. Repeat Mems reading today....Patient pending [facility] for coronary vessel evaluation. Patient denies chest pain, shortness of breath, abdominal pain.        Review of symptoms:   Cardiac:  No chest pain. No dyspnea. No palpitations.  Respiratory: no cough. No dyspnea  Gastrointestinal: No diarrhea. No abdominal pain. No bleeding.   Neuro: No focal neuro complaints.    Vitals:  T(C): 36.7 (01-10-25 @ 04:19), Max: 36.7 (01-09-25 @ 17:47)  HR: 97 (01-10-25 @ 00:51) (60 - 97)  BP: 137/71 (01-10-25 @ 04:19) (125/62 - 165/63)  RR: 17 (01-10-25 @ 00:51) (17 - 19)  SpO2: 88% (01-10-25 @ 00:51) (88% - 93%)  Wt(kg): --  I&O's Summary    09 Jan 2025 07:01  -  10 Jorge 2025 07:00  --------------------------------------------------------  IN: 500 mL / OUT: 1990 mL / NET: -1490 mL      Weight (kg): 73.5 (01-07 @ 14:24)    PHYSICAL EXAM:  Appearance: Comfortable. No acute distress  HEENT:  Head and neck: Atraumatic. Normocephalic.  Normal oral mucosa, PERRL, Neck is supple. No JVD, No carotid bruit.   Neurologic: A&Ox 3, no focal deficits. EOMI, Cranial nerves are intact.  Lymphatic: No cervical lymphadenopathy  Cardiovascular: Normal S1 S2, No murmur, rubs/gallops. No JVD, No edema  Respiratory: Lungs clear to auscultation  Gastrointestinal:  Soft, Non-tender, + BS  Lower Extremities: No edema  Psychiatry: Patient is calm. No agitation. Mood & affect appropriate  Skin: No rashes/ecchymoses/cyanosis/ulcers visualized on the face, hands or feet.    CURRENT CARDIAC MEDICATIONS:  furosemide   Injectable 40 milliGRAM(s) IV Push two times a day  isosorbide   mononitrate ER Tablet (IMDUR) 60 milliGRAM(s) Oral daily  metoprolol succinate  milliGRAM(s) Oral daily  ranolazine 500 milliGRAM(s) Oral two times a day  sacubitril 49 mG/valsartan 51 mG 1 Tablet(s) Oral two times a day      CURRENT OTHER MEDICATIONS:  acetaminophen     Tablet .. 650 milliGRAM(s) Oral every 6 hours PRN Temp greater or equal to 38C (100.4F), Mild Pain (1 - 3)  gabapentin 300 milliGRAM(s) Oral at bedtime  melatonin 3 milliGRAM(s) Oral at bedtime PRN Insomnia  ondansetron Injectable 4 milliGRAM(s) IV Push every 8 hours PRN Nausea and/or Vomiting  aluminum hydroxide/magnesium hydroxide/simethicone Suspension 30 milliLiter(s) Oral every 4 hours PRN Dyspepsia  atorvastatin 80 milliGRAM(s) Oral at bedtime  dextrose 50% Injectable 25 Gram(s) IV Push once, Stop order after: 1 Doses  dextrose 50% Injectable 12.5 Gram(s) IV Push once, Stop order after: 1 Doses  dextrose 50% Injectable 25 Gram(s) IV Push once, Stop order after: 1 Doses  dextrose Oral Gel 15 Gram(s) Oral once, Stop order after: 1 Doses PRN Blood Glucose LESS THAN 70 milliGRAM(s)/deciliter  glucagon  Injectable 1 milliGRAM(s) IntraMuscular once, Stop order after: 1 Doses  insulin glargine Injectable (LANTUS) 26 Unit(s) SubCutaneous at bedtime  insulin lispro (ADMELOG) corrective regimen sliding scale   SubCutaneous three times a day before meals  insulin lispro (ADMELOG) corrective regimen sliding scale   SubCutaneous at bedtime  aspirin enteric coated 81 milliGRAM(s) Oral daily  clopidogrel Tablet 75 milliGRAM(s) Oral daily  dextrose 5%. 1000 milliLiter(s) (50 mL/Hr) IV Continuous <Continuous>  dextrose 5%. 1000 milliLiter(s) (100 mL/Hr) IV Continuous <Continuous>  ferrous    sulfate 325 milliGRAM(s) Oral daily  heparin   Injectable 5000 Unit(s) SubCutaneous every 8 hours  sodium bicarbonate 650 milliGRAM(s) Oral two times a day      LABS:	 	                            9.4    9.34  )-----------( 497      ( 10 Jorge 2025 04:47 )             29.5     01-10    141  |  105  |  22.2[H]  ----------------------------<  127[H]  4.0   |  24.0  |  1.52[H]    Ca    8.2[L]      10 Jorge 2025 04:47        Lipid Profile:   HgA1c:   TSH:     TELEMETRY:   ECG:    DIAGNOSTIC TESTING:  [x] Echocardiogram:   < from: TTE W or WO Ultrasound Enhancing Agent (12.05.24 @ 21:37) >  CONCLUSIONS:      1. Technically difficult image quality.   2. Left ventricular cavity is normal in size. Left ventricular systolic function is moderately decreased with an ejection fraction of 39 % by Taylor's method of disks with an ejection fraction visually estimated at 35 to 40 %.   3. Multiple segmental abnormalities exist. See findings.   4. Unable to assess left ventricular diastolic function due to insufficient data.   5. Normal right ventricular cavity size and normal right ventricular systolic function.   6. Left atrium is normal in size.   7. Mild to moderate mitral regurgitation.   8. Mild tricuspid regurgitation.   9. Estimated pulmonary artery systolic pressure is 38 mmHg, consistent with mild pulmonary hypertension.  10. Trileaflet aortic valve. Fibrocalcific aortic valve sclerosis without stenosis.  11. Trace aortic regurgitation.  12. No pericardial effusion seen.  13. Compared to the transthoracic echocardiogram performed on 10/8/2024, there is further decline in left ventricular function, from 45-50% to 35-40%.    < end of copied text >    [ ] Catheterization:  [ ] Stress Test:    OTHER: 	                                                                [facility]                                                         CARDIOLOGY AT [place] [address]                                                         Telephone: [phone]. Fax: [phone]                                                                             PROGRESS NOTE    Reason for follow up: HF exacerbation  Update: Patient was seen and examined by bedside. No acute events over night. Patient s/p multiple rounds of IV lasix for diuresis. Repeat Mems reading today....Patient pending PCI of RCA on Monday 1/13. Patient denies chest pain, shortness of breath, abdominal pain.        Review of symptoms:   Cardiac:  No chest pain. No dyspnea. No palpitations.  Respiratory: no cough. No dyspnea  Gastrointestinal: No diarrhea. No abdominal pain. No bleeding.   Neuro: No focal neuro complaints.    Vitals:  T(C): 36.7 (01-10-25 @ 04:19), Max: 36.7 (01-09-25 @ 17:47)  HR: 97 (01-10-25 @ 00:51) (60 - 97)  BP: 137/71 (01-10-25 @ 04:19) (125/62 - 165/63)  RR: 17 (01-10-25 @ 00:51) (17 - 19)  SpO2: 88% (01-10-25 @ 00:51) (88% - 93%)  Wt(kg): --  I&O's Summary    09 Jan 2025 07:01  -  10 Ojrge 2025 07:00  --------------------------------------------------------  IN: 500 mL / OUT: 1990 mL / NET: -1490 mL      Weight (kg): 73.5 (01-07 @ 14:24)    PHYSICAL EXAM:  Appearance: Comfortable. No acute distress  HEENT:  Head and neck: Atraumatic. Normocephalic.  Normal oral mucosa, PERRL, Neck is supple. No JVD, No carotid bruit.   Neurologic: A&Ox 3, no focal deficits. EOMI, Cranial nerves are intact.  Lymphatic: No cervical lymphadenopathy  Cardiovascular: Normal S1 S2, No murmur, rubs/gallops. No JVD, No edema  Respiratory: Lungs clear to auscultation  Gastrointestinal:  Soft, Non-tender, + BS  Lower Extremities: No edema  Psychiatry: Patient is calm. No agitation. Mood & affect appropriate  Skin: No rashes/ecchymoses/cyanosis/ulcers visualized on the face, hands or feet.    CURRENT CARDIAC MEDICATIONS:  furosemide   Injectable 40 milliGRAM(s) IV Push two times a day  isosorbide   mononitrate ER Tablet (IMDUR) 60 milliGRAM(s) Oral daily  metoprolol succinate  milliGRAM(s) Oral daily  ranolazine 500 milliGRAM(s) Oral two times a day  sacubitril 49 mG/valsartan 51 mG 1 Tablet(s) Oral two times a day      CURRENT OTHER MEDICATIONS:  acetaminophen     Tablet .. 650 milliGRAM(s) Oral every 6 hours PRN Temp greater or equal to 38C (100.4F), Mild Pain (1 - 3)  gabapentin 300 milliGRAM(s) Oral at bedtime  melatonin 3 milliGRAM(s) Oral at bedtime PRN Insomnia  ondansetron Injectable 4 milliGRAM(s) IV Push every 8 hours PRN Nausea and/or Vomiting  aluminum hydroxide/magnesium hydroxide/simethicone Suspension 30 milliLiter(s) Oral every 4 hours PRN Dyspepsia  atorvastatin 80 milliGRAM(s) Oral at bedtime  dextrose 50% Injectable 25 Gram(s) IV Push once, Stop order after: 1 Doses  dextrose 50% Injectable 12.5 Gram(s) IV Push once, Stop order after: 1 Doses  dextrose 50% Injectable 25 Gram(s) IV Push once, Stop order after: 1 Doses  dextrose Oral Gel 15 Gram(s) Oral once, Stop order after: 1 Doses PRN Blood Glucose LESS THAN 70 milliGRAM(s)/deciliter  glucagon  Injectable 1 milliGRAM(s) IntraMuscular once, Stop order after: 1 Doses  insulin glargine Injectable (LANTUS) 26 Unit(s) SubCutaneous at bedtime  insulin lispro (ADMELOG) corrective regimen sliding scale   SubCutaneous three times a day before meals  insulin lispro (ADMELOG) corrective regimen sliding scale   SubCutaneous at bedtime  aspirin enteric coated 81 milliGRAM(s) Oral daily  clopidogrel Tablet 75 milliGRAM(s) Oral daily  dextrose 5%. 1000 milliLiter(s) (50 mL/Hr) IV Continuous <Continuous>  dextrose 5%. 1000 milliLiter(s) (100 mL/Hr) IV Continuous <Continuous>  ferrous    sulfate 325 milliGRAM(s) Oral daily  heparin   Injectable 5000 Unit(s) SubCutaneous every 8 hours  sodium bicarbonate 650 milliGRAM(s) Oral two times a day      LABS:	 	                            9.4    9.34  )-----------( 497      ( 10 Jorge 2025 04:47 )             29.5     01-10    141  |  105  |  22.2[H]  ----------------------------<  127[H]  4.0   |  24.0  |  1.52[H]    Ca    8.2[L]      10 Jorge 2025 04:47        Lipid Profile:   HgA1c:   TSH:     TELEMETRY:   ECG:    DIAGNOSTIC TESTING:  [x] Echocardiogram:   < from: TTE W or WO Ultrasound Enhancing Agent (12.05.24 @ 21:37) >  CONCLUSIONS:      1. Technically difficult image quality.   2. Left ventricular cavity is normal in size. Left ventricular systolic function is moderately decreased with an ejection fraction of 39 % by Taylor's method of disks with an ejection fraction visually estimated at 35 to 40 %.   3. Multiple segmental abnormalities exist. See findings.   4. Unable to assess left ventricular diastolic function due to insufficient data.   5. Normal right ventricular cavity size and normal right ventricular systolic function.   6. Left atrium is normal in size.   7. Mild to moderate mitral regurgitation.   8. Mild tricuspid regurgitation.   9. Estimated pulmonary artery systolic pressure is 38 mmHg, consistent with mild pulmonary hypertension.  10. Trileaflet aortic valve. Fibrocalcific aortic valve sclerosis without stenosis.  11. Trace aortic regurgitation.  12. No pericardial effusion seen.  13. Compared to the transthoracic echocardiogram performed on 10/8/2024, there is further decline in left ventricular function, from 45-50% to 35-40%.    < end of copied text >    [ ] Catheterization:  [ ] Stress Test:    OTHER: 	                                                                [facility]                                                         CARDIOLOGY AT [place] [address]                                                         Telephone: [phone]. Fax: [phone]                                                                             PROGRESS NOTE    Reason for follow up: HF exacerbation  Update: Patient was seen and examined by bedside. No acute events over night. Patient s/p multiple rounds of IV lasix for diuresis. Repeat Mems reading today is 24 and unchanged from prior. Patient able to ambulate without feeling short of breath. Patient pending PCI of RCA on Monday 1/13. Patient denies chest pain, shortness of breath, abdominal pain.        Review of symptoms:   Cardiac:  No chest pain. No dyspnea. No palpitations.  Respiratory: no cough. No dyspnea  Gastrointestinal: No diarrhea. No abdominal pain. No bleeding.   Neuro: No focal neuro complaints.    Vitals:  T(C): 36.7 (01-10-25 @ 04:19), Max: 36.7 (01-09-25 @ 17:47)  HR: 97 (01-10-25 @ 00:51) (60 - 97)  BP: 137/71 (01-10-25 @ 04:19) (125/62 - 165/63)  RR: 17 (01-10-25 @ 00:51) (17 - 19)  SpO2: 88% (01-10-25 @ 00:51) (88% - 93%)  Wt(kg): --  I&O's Summary    09 Jan 2025 07:01  -  10 Jorge 2025 07:00  --------------------------------------------------------  IN: 500 mL / OUT: 1990 mL / NET: -1490 mL      Weight (kg): 73.5 (01-07 @ 14:24)    PHYSICAL EXAM:  Appearance: Comfortable. No acute distress  HEENT:  Head and neck: Atraumatic. Normocephalic.  Normal oral mucosa, PERRL, Neck is supple. No JVD, No carotid bruit.   Neurologic: A&Ox 3, no focal deficits. EOMI, Cranial nerves are intact.  Lymphatic: No cervical lymphadenopathy  Cardiovascular: Normal S1 S2, No murmur, rubs/gallops. No JVD, No edema  Respiratory: Lungs clear to auscultation  Gastrointestinal:  Soft, Non-tender, + BS  Lower Extremities: No edema  Psychiatry: Patient is calm. No agitation. Mood & affect appropriate  Skin: No rashes/ecchymoses/cyanosis/ulcers visualized on the face, hands or feet.    CURRENT CARDIAC MEDICATIONS:  furosemide   Injectable 40 milliGRAM(s) IV Push two times a day  isosorbide   mononitrate ER Tablet (IMDUR) 60 milliGRAM(s) Oral daily  metoprolol succinate  milliGRAM(s) Oral daily  ranolazine 500 milliGRAM(s) Oral two times a day  sacubitril 49 mG/valsartan 51 mG 1 Tablet(s) Oral two times a day      CURRENT OTHER MEDICATIONS:  acetaminophen     Tablet .. 650 milliGRAM(s) Oral every 6 hours PRN Temp greater or equal to 38C (100.4F), Mild Pain (1 - 3)  gabapentin 300 milliGRAM(s) Oral at bedtime  melatonin 3 milliGRAM(s) Oral at bedtime PRN Insomnia  ondansetron Injectable 4 milliGRAM(s) IV Push every 8 hours PRN Nausea and/or Vomiting  aluminum hydroxide/magnesium hydroxide/simethicone Suspension 30 milliLiter(s) Oral every 4 hours PRN Dyspepsia  atorvastatin 80 milliGRAM(s) Oral at bedtime  dextrose 50% Injectable 25 Gram(s) IV Push once, Stop order after: 1 Doses  dextrose 50% Injectable 12.5 Gram(s) IV Push once, Stop order after: 1 Doses  dextrose 50% Injectable 25 Gram(s) IV Push once, Stop order after: 1 Doses  dextrose Oral Gel 15 Gram(s) Oral once, Stop order after: 1 Doses PRN Blood Glucose LESS THAN 70 milliGRAM(s)/deciliter  glucagon  Injectable 1 milliGRAM(s) IntraMuscular once, Stop order after: 1 Doses  insulin glargine Injectable (LANTUS) 26 Unit(s) SubCutaneous at bedtime  insulin lispro (ADMELOG) corrective regimen sliding scale   SubCutaneous three times a day before meals  insulin lispro (ADMELOG) corrective regimen sliding scale   SubCutaneous at bedtime  aspirin enteric coated 81 milliGRAM(s) Oral daily  clopidogrel Tablet 75 milliGRAM(s) Oral daily  dextrose 5%. 1000 milliLiter(s) (50 mL/Hr) IV Continuous <Continuous>  dextrose 5%. 1000 milliLiter(s) (100 mL/Hr) IV Continuous <Continuous>  ferrous    sulfate 325 milliGRAM(s) Oral daily  heparin   Injectable 5000 Unit(s) SubCutaneous every 8 hours  sodium bicarbonate 650 milliGRAM(s) Oral two times a day      LABS:	 	                            9.4    9.34  )-----------( 497      ( 10 Jorge 2025 04:47 )             29.5     01-10    141  |  105  |  22.2[H]  ----------------------------<  127[H]  4.0   |  24.0  |  1.52[H]    Ca    8.2[L]      10 Jorge 2025 04:47        Lipid Profile:   HgA1c:   TSH:     TELEMETRY:   ECG:    DIAGNOSTIC TESTING:  [x] Echocardiogram:   < from: TTE W or WO Ultrasound Enhancing Agent (12.05.24 @ 21:37) >  CONCLUSIONS:      1. Technically difficult image quality.   2. Left ventricular cavity is normal in size. Left ventricular systolic function is moderately decreased with an ejection fraction of 39 % by Taylor's method of disks with an ejection fraction visually estimated at 35 to 40 %.   3. Multiple segmental abnormalities exist. See findings.   4. Unable to assess left ventricular diastolic function due to insufficient data.   5. Normal right ventricular cavity size and normal right ventricular systolic function.   6. Left atrium is normal in size.   7. Mild to moderate mitral regurgitation.   8. Mild tricuspid regurgitation.   9. Estimated pulmonary artery systolic pressure is 38 mmHg, consistent with mild pulmonary hypertension.  10. Trileaflet aortic valve. Fibrocalcific aortic valve sclerosis without stenosis.  11. Trace aortic regurgitation.  12. No pericardial effusion seen.  13. Compared to the transthoracic echocardiogram performed on 10/8/2024, there is further decline in left ventricular function, from 45-50% to 35-40%.    < end of copied text >    [ ] Catheterization:  [ ] Stress Test:    OTHER:

## 2025-01-10 NOTE — PROGRESS NOTE ADULT - SUBJECTIVE AND OBJECTIVE BOX
Heart failure      HPI:  Patient seen and examined before midnight.     73 y/o female with PMH of HTN, HLD, CAD s/p PCI, HFrEF, Mobitz II s/p PPM (MD NereydaT), PAD with chronic right LE wound, CVA, DM2, CKD3 came to the ED complaining of shortness of breath and fatigue x 4 days. Patient said "my body is full of water". As per son (Balaji) and granddaughter (Lexi) via phone, patient was discharged about 2 weeks ago and was told to use Lasix PRN. They reported patient gained 11lb in 4 days, cardiology called saying to give her Lasix 80mg which was given in the last 2 days. Patient endorsed orthopnea, burning in her chest. She has no fever, chills, nausea, vomiting, palpitation, abdominal pain, change in bowel/urinary habit, fall, HA, dizziness.   (07 Jan 2025 21:32)    Interval History:  Patient was seen and examined at bedside around 11 am.  Feels better.  Breathing is improving and swelling in legs is much better.   Denies chest pain, palpitations, headache, dizziness, visual symptoms, nausea, vomiting or abdominal pain.    ROS:  As per interval history otherwise unremarkable.    PHYSICAL EXAM:  Vital Signs   T(C): 36.7 (10 Jorge 2025 16:24), Max: 36.7 (09 Jan 2025 17:47)  T(F): 98 (10 Jorge 2025 16:24), Max: 98 (09 Jan 2025 17:47)  HR: 66 (10 Jorge 2025 16:24) (62 - 97)  BP: 146/66 (10 Jorge 2025 16:24) (130/67 - 165/63)  BP(mean): 88 (09 Jan 2025 21:41) (88 - 97)  RR: 18 (10 Jorge 2025 16:24) (17 - 19)  SpO2: 95% (10 Jorge 2025 16:24) (88% - 95%)  Parameters below as of 10 Jorge 2025 16:24  Patient On (Oxygen Delivery Method): room air  General: Elderly female sitting in recliner comfortably. No acute distress  HEENT: EOMI. Clear conjunctivae. Moist mucus membrane  Neck: Supple.   Chest: Good air entry. No wheezing, rales or rhonchi.   Heart: Normal S1 & S2. RRR.   Abdomen: Non distended. Soft. Non-tender. + BS  Ext: No pedal edema. No calf tenderness   Neuro: Awake and alert. No focal deficit. Speech clear.   Skin: Warm and Dry  Psychiatry: Normal mood and affect    I&O's Summary    09 Jan 2025 07:01  -  10 Jorge 2025 07:00  --------------------------------------------------------  IN: 500 mL / OUT: 1990 mL / NET: -1490 mL    LABS:  CAPILLARY BLOOD GLUCOSE  POCT Blood Glucose.: 128 mg/dL (10 Jorge 2025 11:47)  POCT Blood Glucose.: 170 mg/dL (10 Jorge 2025 08:49)  POCT Blood Glucose.: 260 mg/dL (09 Jan 2025 21:48)                      9.4    9.34  )-----------( 497      ( 10 Jorge 2025 04:47 )             29.5     01-10    141  |  105  |  22.2[H]  ----------------------------<  127[H]  4.0   |  24.0  |  1.52[H]    Ca    8.2[L]      10 Jorge 2025 04:47    Urinalysis Basic - ( 10 Jorge 2025 04:47 )    Color: x / Appearance: x / SG: x / pH: x  Gluc: 127 mg/dL / Ketone: x  / Bili: x / Urobili: x   Blood: x / Protein: x / Nitrite: x   Leuk Esterase: x / RBC: x / WBC x   Sq Epi: x / Non Sq Epi: x / Bacteria: x    RADIOLOGY & ADDITIONAL STUDIES:  Reviewed     MEDICATIONS  (STANDING):  aspirin enteric coated 81 milliGRAM(s) Oral daily  atorvastatin 80 milliGRAM(s) Oral at bedtime  clopidogrel Tablet 75 milliGRAM(s) Oral daily  dextrose 5%. 1000 milliLiter(s) (50 mL/Hr) IV Continuous <Continuous>  dextrose 5%. 1000 milliLiter(s) (100 mL/Hr) IV Continuous <Continuous>  dextrose 50% Injectable 25 Gram(s) IV Push once  dextrose 50% Injectable 12.5 Gram(s) IV Push once  dextrose 50% Injectable 25 Gram(s) IV Push once  ferrous    sulfate 325 milliGRAM(s) Oral daily  furosemide   Injectable 40 milliGRAM(s) IV Push two times a day  gabapentin 300 milliGRAM(s) Oral at bedtime  glucagon  Injectable 1 milliGRAM(s) IntraMuscular once  heparin   Injectable 5000 Unit(s) SubCutaneous every 8 hours  insulin glargine Injectable (LANTUS) 26 Unit(s) SubCutaneous at bedtime  insulin lispro (ADMELOG) corrective regimen sliding scale   SubCutaneous three times a day before meals  insulin lispro (ADMELOG) corrective regimen sliding scale   SubCutaneous at bedtime  isosorbide   mononitrate ER Tablet (IMDUR) 60 milliGRAM(s) Oral daily  metoprolol succinate  milliGRAM(s) Oral daily  ranolazine 500 milliGRAM(s) Oral two times a day  sacubitril 49 mG/valsartan 51 mG 1 Tablet(s) Oral two times a day  sodium bicarbonate 650 milliGRAM(s) Oral two times a day    MEDICATIONS  (PRN):  acetaminophen     Tablet .. 650 milliGRAM(s) Oral every 6 hours PRN Temp greater or equal to 38C (100.4F), Mild Pain (1 - 3)  aluminum hydroxide/magnesium hydroxide/simethicone Suspension 30 milliLiter(s) Oral every 4 hours PRN Dyspepsia  dextrose Oral Gel 15 Gram(s) Oral once PRN Blood Glucose LESS THAN 70 milliGRAM(s)/deciliter  melatonin 3 milliGRAM(s) Oral at bedtime PRN Insomnia  ondansetron Injectable 4 milliGRAM(s) IV Push every 8 hours PRN Nausea and/or Vomiting

## 2025-01-10 NOTE — PROGRESS NOTE ADULT - ASSESSMENT
73 y/o female with PMH of HTN, HLD, CAD s/p PCI, HFrEF, Mobitz II s/p PPM (MD NereydaT), PAD with chronic right LE wound, CVA, DM2, CKD3 came to the ED complaining of shortness of breath and fatigue x 4 days. Patient endorsed orthopnea, burning in her chest.     Acute on Chronic Systolic Heart Failure   ECHO with new decrease in EF  Possible angio next week  Strict intake / output and daily weight   IV Lasix 40mg BID  Continue Metoprolol / Imdur / Entresto  Cardiology recs appreciated  Saturating well on room air    CAD / HLD / HTN  Aspirin 81mg   Plavix 75mg   Atorvastatin 80mg   Ranolazine Er 500mg bid   Imdur 60mg   Metoprolol ER 100mg   Possible cath next week  CArdio following     DM-2   A1c 7.1 on 10/9  Hold Metformin 1000mg bid   Lantus 26 units HS   FS with ISS    CKD-3   Stable   Monitor renal function     Anemia   Likely of chronic disease   Hb stable   Iron tab (as per granddaughter, she now only takes it daily instead of tid)   Monitor CBC    DVT ppx - Heparin SQ    Dispo: Home once stable    Updated patient's family at bedside.      73 y/o female with PMH of HTN, HLD, CAD s/p PCI, HFrEF, Mobitz II s/p PPM (MD NereydaT), PAD with chronic right LE wound, CVA, DM2, CKD3 came to the ED complaining of shortness of breath and fatigue x 4 days. Patient endorsed orthopnea, burning in her chest.     Acute on Chronic Systolic Heart Failure   ECHO with new decrease in EF  Possible angio next week  Strict intake / output and daily weight   IV Lasix 40mg BID  Continue Metoprolol / Imdur / Entresto  Cardiology recs appreciated  Saturating well on room air    CAD / HLD / HTN  Aspirin 81mg   Plavix 75mg   Atorvastatin 80mg   Ranolazine 500mg bid   Imdur 60mg   Metoprolol ER 100mg   Possible cath next week  CArdio following     DM-2   A1c 7.1 on 10/9  Hold Metformin 1000mg bid   Lantus 26 units HS   FS with ISS    CKD-3   Stable   Monitor renal function     Anemia   Likely of chronic disease   Hb stable   Iron tab (as per granddaughter, she now only takes it daily instead of tid)   Monitor CBC    DVT ppx - Heparin SQ    Dispo: Home once stable    Updated patient's family at bedside.

## 2025-01-10 NOTE — PROGRESS NOTE ADULT - ASSESSMENT
74F Kittitian-speaking ( # [id number]) history significant for HTN, DM2, CKD 3, heart block s/p Micra PPM, CVA, CAD/stent, PAD, CVA, HFmrEF with recurrent hospitalization for HF in 10/8-10/14 and 11/16-11/21/24 underwent repeat LHC on 11/19/24 with critical ISR of RCA stent 99% and 95% OM1 with 95% [id number] LAD no PCI perform yet for optimization of CHF, discharged on Lasix 40mg once daily with low dose Entresto had mild BRADY, interval with worsening dyspnea despite extra Lasix use prompted return to the ER and readmitted for ADHF, pBNP 22K (prior 16K) in 12/3/24, repeat Echo with LV EF 39%, subsequently underwent CardioMems implant 12/9 (PAD 17 and PCWP 11) was discharged on 12/20/24 with Lasix 40mg once daily but with fluid overload prompted readmission 1/7/25 with pBNP 40K.    #Acute on Chronic Heart Failure Exacerbation  #CAD  - C/w IV lasix 40mg BID  - C/w DAPT (ASA, plavix)   - C/w statin  - GDMT: Entresto, metoprolol  - EP recs appreciated. Ischemic evaluation before attempting CRT upgrade  - Plan for LHC 1/13    INCOMPLETE 74F Citizen of [place]-speaking ( # [id number]) history significant for HTN, DM2, CKD 3, heart block s/p Micra PPM, CVA, CAD/stent, PAD, CVA, HFmrEF with recurrent hospitalization for HF in 10/8-10/14 and 11/16-11/21/24 underwent repeat LHC on 11/19/24 with critical ISR of RCA stent 99% and 95% OM1 with 95% [id number] LAD no PCI perform yet for optimization of CHF, discharged on Lasix 40mg once daily with low dose Entresto had mild BRADY, interval with worsening dyspnea despite extra Lasix use prompted return to the ER and readmitted for ADHF, pBNP 22K (prior 16K) in 12/3/24, repeat Echo with LV EF 39%, subsequently underwent CardioMems implant 12/9 (PAD 17 and PCWP 11) was discharged on 12/20/24 with Lasix 40mg once daily but with fluid overload prompted readmission 1/7/25 with pBNP 40K.    #Acute on Chronic Heart Failure Exacerbation  #CAD  - PAP 24mmHg today  - C/w IV lasix 40mg BID  - C/w DAPT (ASA, plavix)   - C/w statin  - GDMT: Entresto, metoprolol  - EP recs appreciated - ischemic evaluation before attempting CRT upgrade.  - Plan for LHC 1/13    INCOMPLETE

## 2025-01-10 NOTE — PROGRESS NOTE ADULT - ATTENDING COMMENTS
seen with above,    74F [place]er-speaking ( # [id number]) history significant for HTN, DM2, CKD 3, heart block s/p Micra PPM, CVA, CAD/stent, PAD, CVA, HFmrEF with recurrent hospitalization for HF in 10/8-10/14 and 11/16-11/21/24 underwent repeat LHC on 11/19/24 with critical ISR of RCA stent 99% and 95% OM1 with 95% [id number] LAD no PCI perform yet for optimization of CHF, discharged on Lasix 40mg once daily with low dose Entresto had mild BRADY, interval with worsening dyspnea despite extra Lasix use prompted return to the ER and readmitted for ADHF, pBNP 22K (prior 16K) in 12/3/24, repeat Echo with LV EF 39%, subsequently underwent CardioMems implant 12/9 (PAD 17 and PCWP 11) was discharged on 12/20/24 with Lasix 40mg once daily but with fluid overload prompted readmission 1/7/25 with pBNP 40K      -still overloaded will increase IV Lasix to 40mg BID, recheck Mems reading tomorrow   -discussed with interventionalists given recurrent ADHF admissions to attempt PCI of the RCA, will continue to optimize for next week  -continue DAPT on clopidogrel, high dose statin   -switched amlodipine to Entresto for GDMT, eventual SGLT2i if Cr. stable, on metoprolol succinate 100mg   -EP eval. for CRT upgrade given chronic RV pacing 100% now with LV EF <50% and recurrent HF but requested revascularization of CAD first if still no improvement in LV EF   -updated patient's  and daughters at the bedside       Mendoza Pham DO, [facility]  Faculty Non-Invasive Cardiologist  [phone].

## 2025-01-11 DIAGNOSIS — I25.10 ATHEROSCLEROTIC HEART DISEASE OF NATIVE CORONARY ARTERY WITHOUT ANGINA PECTORIS: ICD-10-CM

## 2025-01-11 DIAGNOSIS — I50.23 ACUTE ON CHRONIC SYSTOLIC (CONGESTIVE) HEART FAILURE: ICD-10-CM

## 2025-01-11 LAB
ANION GAP SERPL CALC-SCNC: 15 MMOL/L — SIGNIFICANT CHANGE UP (ref 5–17)
BUN SERPL-MCNC: 23.5 MG/DL — HIGH (ref 8–20)
CALCIUM SERPL-MCNC: 8.2 MG/DL — LOW (ref 8.4–10.5)
CHLORIDE SERPL-SCNC: 106 MMOL/L — SIGNIFICANT CHANGE UP (ref 96–108)
CO2 SERPL-SCNC: 20 MMOL/L — LOW (ref 22–29)
CREAT SERPL-MCNC: 1.54 MG/DL — HIGH (ref 0.5–1.3)
EGFR: 36 ML/MIN/1.73M2 — LOW
GLUCOSE BLDC GLUCOMTR-MCNC: 107 MG/DL — HIGH (ref 70–99)
GLUCOSE BLDC GLUCOMTR-MCNC: 131 MG/DL — HIGH (ref 70–99)
GLUCOSE BLDC GLUCOMTR-MCNC: 157 MG/DL — HIGH (ref 70–99)
GLUCOSE BLDC GLUCOMTR-MCNC: 158 MG/DL — HIGH (ref 70–99)
GLUCOSE SERPL-MCNC: 88 MG/DL — SIGNIFICANT CHANGE UP (ref 70–99)
HBA1C MFR BLD HPLC: 7.1
HCT VFR BLD CALC: 31.9 % — LOW (ref 34.5–45)
HGB BLD-MCNC: 10.1 G/DL — LOW (ref 11.5–15.5)
MAGNESIUM SERPL-MCNC: 2.3 MG/DL — SIGNIFICANT CHANGE UP (ref 1.6–2.6)
MCHC RBC-ENTMCNC: 27.2 PG — SIGNIFICANT CHANGE UP (ref 27–34)
MCHC RBC-ENTMCNC: 31.7 G/DL — LOW (ref 32–36)
MCV RBC AUTO: 86 FL — SIGNIFICANT CHANGE UP (ref 80–100)
PLATELET # BLD AUTO: 529 K/UL — HIGH (ref 150–400)
POTASSIUM SERPL-MCNC: 3.7 MMOL/L — SIGNIFICANT CHANGE UP (ref 3.5–5.3)
POTASSIUM SERPL-SCNC: 3.7 MMOL/L — SIGNIFICANT CHANGE UP (ref 3.5–5.3)
RBC # BLD: 3.71 M/UL — LOW (ref 3.8–5.2)
RBC # FLD: 19.9 % — HIGH (ref 10.3–14.5)
SODIUM SERPL-SCNC: 141 MMOL/L — SIGNIFICANT CHANGE UP (ref 135–145)
WBC # BLD: 10.24 K/UL — SIGNIFICANT CHANGE UP (ref 3.8–10.5)
WBC # FLD AUTO: 10.24 K/UL — SIGNIFICANT CHANGE UP (ref 3.8–10.5)

## 2025-01-11 PROCEDURE — 99233 SBSQ HOSP IP/OBS HIGH 50: CPT

## 2025-01-11 PROCEDURE — 99234 HOSP IP/OBS SM DT SF/LOW 45: CPT

## 2025-01-11 RX ORDER — FUROSEMIDE 20 MG
40 TABLET ORAL
Refills: 0 | Status: DISCONTINUED | OUTPATIENT
Start: 2025-01-11 | End: 2025-01-14

## 2025-01-11 RX ORDER — POTASSIUM CHLORIDE 600 MG/1
40 TABLET, FILM COATED, EXTENDED RELEASE ORAL ONCE
Refills: 0 | Status: COMPLETED | OUTPATIENT
Start: 2025-01-11 | End: 2025-01-11

## 2025-01-11 RX ADMIN — Medication 325 MILLIGRAM(S): at 13:03

## 2025-01-11 RX ADMIN — Medication 100 MILLIGRAM(S): at 05:35

## 2025-01-11 RX ADMIN — Medication 40 MILLIGRAM(S): at 05:37

## 2025-01-11 RX ADMIN — Medication 1: at 17:30

## 2025-01-11 RX ADMIN — Medication 40 MILLIGRAM(S): at 13:03

## 2025-01-11 RX ADMIN — ACETAMINOPHEN 650 MILLIGRAM(S): 80 SOLUTION/ DROPS ORAL at 06:37

## 2025-01-11 RX ADMIN — HEPARIN SODIUM 5000 UNIT(S): 1000 INJECTION, SOLUTION INTRAVENOUS; SUBCUTANEOUS at 13:03

## 2025-01-11 RX ADMIN — CLOPIDOGREL BISULFATE 75 MILLIGRAM(S): 75 TABLET, FILM COATED ORAL at 13:03

## 2025-01-11 RX ADMIN — HEPARIN SODIUM 5000 UNIT(S): 1000 INJECTION, SOLUTION INTRAVENOUS; SUBCUTANEOUS at 21:19

## 2025-01-11 RX ADMIN — SODIUM BICARBONATE 650 MILLIGRAM(S): 84 INJECTION, SOLUTION INTRAVENOUS at 05:35

## 2025-01-11 RX ADMIN — SACUBITRIL AND VALSARTAN 1 TABLET(S): 24; 26 TABLET, FILM COATED ORAL at 17:29

## 2025-01-11 RX ADMIN — POTASSIUM CHLORIDE 40 MILLIEQUIVALENT(S): 600 TABLET, FILM COATED, EXTENDED RELEASE ORAL at 13:09

## 2025-01-11 RX ADMIN — ACETAMINOPHEN 650 MILLIGRAM(S): 80 SOLUTION/ DROPS ORAL at 14:38

## 2025-01-11 RX ADMIN — ACETAMINOPHEN 650 MILLIGRAM(S): 80 SOLUTION/ DROPS ORAL at 23:53

## 2025-01-11 RX ADMIN — HEPARIN SODIUM 5000 UNIT(S): 1000 INJECTION, SOLUTION INTRAVENOUS; SUBCUTANEOUS at 05:36

## 2025-01-11 RX ADMIN — ATORVASTATIN CALCIUM 80 MILLIGRAM(S): 40 TABLET, FILM COATED ORAL at 21:19

## 2025-01-11 RX ADMIN — GABAPENTIN 300 MILLIGRAM(S): 300 CAPSULE ORAL at 21:19

## 2025-01-11 RX ADMIN — Medication 81 MILLIGRAM(S): at 13:03

## 2025-01-11 RX ADMIN — Medication 60 MILLIGRAM(S): at 13:03

## 2025-01-11 RX ADMIN — ACETAMINOPHEN 650 MILLIGRAM(S): 80 SOLUTION/ DROPS ORAL at 05:38

## 2025-01-11 RX ADMIN — Medication 1: at 13:04

## 2025-01-11 RX ADMIN — RANOLAZINE 500 MILLIGRAM(S): 1000 TABLET, FILM COATED, EXTENDED RELEASE ORAL at 17:29

## 2025-01-11 RX ADMIN — ACETAMINOPHEN 650 MILLIGRAM(S): 80 SOLUTION/ DROPS ORAL at 15:36

## 2025-01-11 RX ADMIN — RANOLAZINE 500 MILLIGRAM(S): 1000 TABLET, FILM COATED, EXTENDED RELEASE ORAL at 05:35

## 2025-01-11 RX ADMIN — INSULIN GLARGINE-YFGN 26 UNIT(S): 100 INJECTION, SOLUTION SUBCUTANEOUS at 21:19

## 2025-01-11 RX ADMIN — SACUBITRIL AND VALSARTAN 1 TABLET(S): 24; 26 TABLET, FILM COATED ORAL at 05:35

## 2025-01-11 RX ADMIN — SODIUM BICARBONATE 650 MILLIGRAM(S): 84 INJECTION, SOLUTION INTRAVENOUS at 17:29

## 2025-01-11 NOTE — PROGRESS NOTE ADULT - SUBJECTIVE AND OBJECTIVE BOX
[facility]                                                         CARDIOLOGY AT [place] [address]                                                         Telephone: [phone]. Fax:[phone]                                                                             PROGRESS NOTE    Reason for follow up: Acute on chronic HFrEF exacerbation   Update: Pt seen and examined at the bedside.  used (Cornelius ID# [id number]). Repeat Cardiomems reading today is 11-13. Pt has no complaints at this time.     Review of symptoms:   Cardiac:  No chest pain. No dyspnea. No palpitations.  Respiratory: no cough. No dyspnea  Gastrointestinal: No diarrhea. No abdominal pain. No bleeding.   Neuro: No focal neuro complaints.    Vitals:  T(C): 36.6 (01-11-25 @ 08:41), Max: 36.9 (01-10-25 @ 20:39)  HR: 63 (01-11-25 @ 08:41) (35 - 69)  BP: 119/60 (01-11-25 @ 08:41) (119/60 - 146/66)  RR: 18 (01-11-25 @ 08:41) (18 - 18)  SpO2: 94% (01-11-25 @ 08:41) (93% - 98%)  Wt(kg): --  I&O's Summary    10 Jorge 2025 07:01  -  11 Jan 2025 07:00  --------------------------------------------------------  IN: 250 mL / OUT: 900 mL / NET: -650 mL    11 Jan 2025 07:01  -  11 Jan 2025 10:49  --------------------------------------------------------  IN: 210 mL / OUT: 800 mL / NET: -590 mL      Weight (kg): 73.5 (01-07 @ 14:24)    PHYSICAL EXAM:  Appearance: Comfortable. No acute distress  HEENT:  Atraumatic. Normocephalic.  Normal oral mucosa  Neurologic: A & O x 3, no gross focal deficits.  Cardiovascular: RRR S1 S2, No murmur, no rubs/gallops. No JVD  Respiratory: Lungs clear to auscultation, unlabored   Gastrointestinal:  Soft, Non-tender, + BS  Lower Extremities: 2+ Peripheral Pulses, No clubbing, cyanosis, or edema  Psychiatry: Patient is calm. No agitation.   Skin: warm and dry.    CURRENT CARDIAC MEDICATIONS:  furosemide   Injectable 40 milliGRAM(s) IV Push two times a day  isosorbide   mononitrate ER Tablet (IMDUR) 60 milliGRAM(s) Oral daily  metoprolol succinate  milliGRAM(s) Oral daily  ranolazine 500 milliGRAM(s) Oral two times a day  sacubitril 49 mG/valsartan 51 mG 1 Tablet(s) Oral two times a day      CURRENT OTHER MEDICATIONS:  acetaminophen     Tablet .. 650 milliGRAM(s) Oral every 6 hours PRN Temp greater or equal to 38C (100.4F), Mild Pain (1 - 3)  gabapentin 300 milliGRAM(s) Oral at bedtime  melatonin 3 milliGRAM(s) Oral at bedtime PRN Insomnia  ondansetron Injectable 4 milliGRAM(s) IV Push every 8 hours PRN Nausea and/or Vomiting  aluminum hydroxide/magnesium hydroxide/simethicone Suspension 30 milliLiter(s) Oral every 4 hours PRN Dyspepsia  atorvastatin 80 milliGRAM(s) Oral at bedtime  dextrose 50% Injectable 25 Gram(s) IV Push once, Stop order after: 1 Doses  dextrose 50% Injectable 12.5 Gram(s) IV Push once, Stop order after: 1 Doses  dextrose 50% Injectable 25 Gram(s) IV Push once, Stop order after: 1 Doses  dextrose Oral Gel 15 Gram(s) Oral once, Stop order after: 1 Doses PRN Blood Glucose LESS THAN 70 milliGRAM(s)/deciliter  glucagon  Injectable 1 milliGRAM(s) IntraMuscular once, Stop order after: 1 Doses  insulin glargine Injectable (LANTUS) 26 Unit(s) SubCutaneous at bedtime  insulin lispro (ADMELOG) corrective regimen sliding scale   SubCutaneous three times a day before meals  insulin lispro (ADMELOG) corrective regimen sliding scale   SubCutaneous at bedtime  aspirin enteric coated 81 milliGRAM(s) Oral daily  clopidogrel Tablet 75 milliGRAM(s) Oral daily  dextrose 5%. 1000 milliLiter(s) (50 mL/Hr) IV Continuous <Continuous>  dextrose 5%. 1000 milliLiter(s) (100 mL/Hr) IV Continuous <Continuous>  ferrous    sulfate 325 milliGRAM(s) Oral daily  heparin   Injectable 5000 Unit(s) SubCutaneous every 8 hours  potassium chloride    Tablet ER 40 milliEquivalent(s) Oral once, Stop order after: 1 Doses  sodium bicarbonate 650 milliGRAM(s) Oral two times a day    LABS:	 	             10.1   10.24 )-----------( 529      ( 11 Jan 2025 05:07 )             31.9     01-11    141  |  106  |  23.5[H]  ----------------------------<  88  3.7   |  20.0[L]  |  1.54[H]    Ca    8.2[L]      11 Jan 2025 05:07  Mg     2.3     01-11    Lipid Profile:   HgA1c: 7.1%   TSH:     TELEMETRY: Vpaced   ECG: Vpaced     DIAGNOSTIC TESTING:  [x ] Echocardiogram: < from: TTE W or WO Ultrasound Enhancing Agent (12.05.24 @ 21:37) >      1. Technically difficult image quality.   2. Left ventricular cavity is normal in size. Left ventricular systolic function is moderately decreased with an ejection fraction of 39 % by Taylor's method of disks with an ejection fraction visually estimated at 35 to 40 %.   3. Multiple segmental abnormalities exist. See findings.   4. Unable to assess left ventricular diastolic function due to insufficient data.   5. Normal right ventricular cavity size and normal right ventricular systolic function.   6. Left atrium is normal in size.   7. Mild to moderate mitral regurgitation.   8. Mild tricuspid regurgitation.   9. Estimated pulmonary artery systolic pressure is 38 mmHg, consistent with mild pulmonary hypertension.  10. Trileaflet aortic valve. Fibrocalcific aortic valve sclerosis without stenosis.  11. Trace aortic regurgitation.  12. No pericardial effusion seen.  13. Compared to the transthoracic echocardiogram performed on 10/8/2024, there is further decline in left ventricular function, from 45-50% to 35-40%.    ________________________________________________________________________________________    < end of copied text >    [x ]  Catheterization: < from: Cardiac Catheterization (12.09.24 @ 15:55) >    RHC performed via right CFV and CardioMEMS inserted to left pulmonary  artery  RHC: RA 3, RV 30/2/4, PA 29/14/17, Wedge 11. CO/CI 3.7, 2.19      < end of copied text >      < from: Cardiac Catheterization (11.19.24 @ 08:45) >  1. Multivessel coronary artery disease with interval development of  diffuse, critical in-stent restenosis (up to 99%) in RCA. Stable  95% sequential lesions in both limbs of large branching OM1. Diffuse  severe (up to 95%) stenosis of the mid-apical LAD.    2. LVEDP 15 mmHg.      < end of copied text >      [ ] Stress Test:    OTHER:

## 2025-01-11 NOTE — PROGRESS NOTE ADULT - NS ATTEND AMEND GEN_ALL_CORE FT
seen with above,    74F German-speaking ( # [id number]) history significant for HTN, DM2, CKD 3, heart block s/p Micra PPM, CVA, CAD/stent, PAD, CVA, HFmrEF with recurrent hospitalization for HF in 10/8-10/14 and 11/16-11/21/24 underwent repeat LHC on 11/19/24 with critical ISR of RCA stent 99% and 95% OM1 with 95% [id number] LAD no PCI perform yet for optimization of CHF, discharged on Lasix 40mg once daily with low dose Entresto had mild BRADY, interval with worsening dyspnea despite extra Lasix use prompted return to the ER and readmitted for ADHF, pBNP 22K (prior 16K) in 12/3/24, repeat Echo with LV EF 39%, subsequently underwent CardioMems implant 12/9 (PAD 17 and PCWP 11) was discharged on 12/20/24 with Lasix 40mg once daily but with fluid overload prompted readmission 1/7/25 with pBNP 40K      -repeat Mems today improved to 15 from prior 24, will switch IV Lasix 40mg to PO 40mg BID   -discussed with interventionalists given recurrent ADHF admissions to attempt PCI of the RCA, will plan for Monday if Cr. remains stable   -continue DAPT on clopidogrel, high dose statin   -switched amlodipine to Entresto for GDMT, eventual SGLT2i if Cr. stable after PCI, on metoprolol succinate 100mg   -EP eval. for CRT upgrade given chronic RV pacing 100% now with LV EF <50% and recurrent HF but requested revascularization of CAD first if still no improvement in LV EF   -updated patient's  and daughters at the bedside last week       Mendoza Pham DO, [facility]  Faculty Non-Invasive Cardiologist  [phone].

## 2025-01-11 NOTE — PROGRESS NOTE ADULT - ASSESSMENT
74F Greek-speaking history significant for HTN, DM2, CKD 3, heart block s/p Micra PPM, CVA, CAD/stent, PAD, CVA, HFmrEF with recurrent hospitalization for HF in 10/8-10/14 and 11/16-11/21/24 underwent repeat LHC on 11/19/24 with critical ISR of RCA stent 99% and 95% OM1 with 95% [id number] LAD no PCI perform yet for optimization of CHF, discharged on Lasix 40mg once daily with low dose Entresto had mild BRADY, interval with worsening dyspnea despite extra Lasix use prompted return to the ER and readmitted for ADHF, pBNP 22K (prior 16K) in 12/3/24, repeat Echo with LV EF 39%, subsequently underwent CardioMems implant 12/9 (PAD 17 and PCWP 11) was discharged on 12/20/24 with Lasix 40mg once daily but with fluid overload prompted readmission 1/7/25 with pBNP 40K.

## 2025-01-11 NOTE — PROGRESS NOTE ADULT - ASSESSMENT
75 y/o female with PMH of HTN, HLD, CAD s/p PCI, HFrEF, Mobitz II s/p PPM (MD NereydaT), PAD with chronic right LE wound, CVA, DM2, CKD3 came to the ED complaining of shortness of breath and fatigue x 4 days. Patient endorsed orthopnea, burning in her chest.     Acute on Chronic Systolic Heart Failure   ECHO with new decrease in EF  Cath on Monday   Strict intake / output and daily weight   IV Lasix was changed to PO 40mg BID today   Continue Metoprolol / Imdur / Entresto  Cardiology recs appreciated  Saturating well on room air    CAD / HLD / HTN  Aspirin 81mg   Plavix 75mg   Atorvastatin 80mg   Ranolazine ER 500mg bid   Imdur 60mg   Metoprolol ER 100mg   Cath on Monday   Cardio following     DM-2   A1c 7.1 on 10/9  Hold Metformin 1000mg bid   Lantus 26 units HS   FS with ISS    CKD-3   Stable   Monitor renal function     Anemia   Likely of chronic disease   Hb stable   Iron tab (as per granddaughter, she now only takes it daily instead of tid)   Monitor CBC    DVT ppx - Heparin SQ    Dispo: Home likely on Tuesday.     Updated patient's family at bedside.      73 y/o female with PMH of HTN, HLD, CAD s/p PCI, HFrEF, Mobitz II s/p PPM (MD NereydaT), PAD with chronic right LE wound, CVA, DM2, CKD3 came to the ED complaining of shortness of breath and fatigue x 4 days. Patient endorsed orthopnea, burning in her chest.     Acute on Chronic Systolic Heart Failure   ECHO with new decrease in EF  Cath on Monday   Strict intake / output and daily weight   IV Lasix was changed to PO 40mg BID today   Continue Metoprolol / Imdur / Entresto  Cardiology recs appreciated  Saturating well on room air    CAD / HLD / HTN  Aspirin 81mg   Plavix 75mg   Atorvastatin 80mg   Ranolazine 500mg bid   Imdur 60mg   Metoprolol ER 100mg   Cath on Monday   Cardio following     DM-2   A1c 7.1 on 10/9  Hold Metformin 1000mg bid   Lantus 26 units HS   FS with ISS    CKD-3   Stable   Monitor renal function     Anemia   Likely of chronic disease   Hb stable   Iron tab (as per granddaughter, she now only takes it daily instead of tid)   Monitor CBC    DVT ppx - Heparin SQ    Dispo: Home likely on Tuesday.     Updated patient's family at bedside.

## 2025-01-11 NOTE — PROGRESS NOTE ADULT - SUBJECTIVE AND OBJECTIVE BOX
Heart failure      HPI:  Patient seen and examined before midnight.     75 y/o female with PMH of HTN, HLD, CAD s/p PCI, HFrEF, Mobitz II s/p PPM (MD NereydaT), PAD with chronic right LE wound, CVA, DM2, CKD3 came to the ED complaining of shortness of breath and fatigue x 4 days. Patient said "my body is full of water". As per son (Balaji) and granddaughter (Lexi) via phone, patient was discharged about 2 weeks ago and was told to use Lasix PRN. They reported patient gained 11lb in 4 days, cardiology called saying to give her Lasix 80mg which was given in the last 2 days. Patient endorsed orthopnea, burning in her chest. She has no fever, chills, nausea, vomiting, palpitation, abdominal pain, change in bowel/urinary habit, fall, HA, dizziness.   (07 Jan 2025 21:32)    Interval History:  Patient was seen and examined at bedside around 11:45 am with  - Verónica, ID: [id number].  Doing well.   Breathing and swelling in leg are better.    Denies chest pain or palpitations.     ROS:  As per interval history otherwise unremarkable.    PHYSICAL EXAM:  Vital Signs   T(C): 36.6 (11 Jan 2025 08:41), Max: 36.9 (10 Jorge 2025 20:39)  T(F): 97.8 (11 Jan 2025 08:41), Max: 98.5 (10 Jorge 2025 20:39)  HR: 63 (11 Jan 2025 08:41) (35 - 69)  BP: 119/60 (11 Jan 2025 08:41) (119/60 - 135/70)  BP(mean): 92 (11 Jan 2025 05:37) (88 - 92)  RR: 18 (11 Jan 2025 08:41) (18 - 18)  SpO2: 94% (11 Jan 2025 08:41) (93% - 98%)  Parameters below as of 11 Jan 2025 08:41  Patient On (Oxygen Delivery Method): room air  General: Elderly female lying in bed comfortably. No acute distress  HEENT: EOMI. Clear conjunctivae. Moist mucus membrane  Neck: Supple.   Chest: Good air entry. No wheezing, rales or rhonchi.   Heart: Normal S1 & S2. RRR.   Abdomen: Non distended. Soft. Non-tender. + BS  Ext: No pedal edema. No calf tenderness. Chronic skin changes. Dressing on right foot.   Neuro: Awake and alert. No focal deficit. Speech clear.   Skin: Warm and Dry  Psychiatry: Normal mood and affect    I&O's Summary    10 Jorge 2025 07:01  -  11 Jan 2025 07:00  --------------------------------------------------------  IN: 250 mL / OUT: 900 mL / NET: -650 mL    11 Jan 2025 07:01  -  11 Jan 2025 16:28  --------------------------------------------------------  IN: 510 mL / OUT: 1300 mL / NET: -790 mL    LABS:  CAPILLARY BLOOD GLUCOSE  POCT Blood Glucose.: 157 mg/dL (11 Jan 2025 13:01)  POCT Blood Glucose.: 107 mg/dL (11 Jan 2025 09:01)  POCT Blood Glucose.: 213 mg/dL (10 Jorge 2025 21:02)  POCT Blood Glucose.: 176 mg/dL (10 Jorge 2025 16:53)                      10.1   10.24 )-----------( 529      ( 11 Jan 2025 05:07 )             31.9     01-11    141  |  106  |  23.5[H]  ----------------------------<  88  3.7   |  20.0[L]  |  1.54[H]    Ca    8.2[L]      11 Jan 2025 05:07  Mg     2.3     01-11    RADIOLOGY & ADDITIONAL STUDIES:  Reviewed     MEDICATIONS  (STANDING):  aspirin enteric coated 81 milliGRAM(s) Oral daily  atorvastatin 80 milliGRAM(s) Oral at bedtime  clopidogrel Tablet 75 milliGRAM(s) Oral daily  dextrose 5%. 1000 milliLiter(s) (50 mL/Hr) IV Continuous <Continuous>  dextrose 5%. 1000 milliLiter(s) (100 mL/Hr) IV Continuous <Continuous>  dextrose 50% Injectable 25 Gram(s) IV Push once  dextrose 50% Injectable 12.5 Gram(s) IV Push once  dextrose 50% Injectable 25 Gram(s) IV Push once  ferrous    sulfate 325 milliGRAM(s) Oral daily  furosemide    Tablet 40 milliGRAM(s) Oral two times a day  gabapentin 300 milliGRAM(s) Oral at bedtime  glucagon  Injectable 1 milliGRAM(s) IntraMuscular once  heparin   Injectable 5000 Unit(s) SubCutaneous every 8 hours  insulin glargine Injectable (LANTUS) 26 Unit(s) SubCutaneous at bedtime  insulin lispro (ADMELOG) corrective regimen sliding scale   SubCutaneous three times a day before meals  insulin lispro (ADMELOG) corrective regimen sliding scale   SubCutaneous at bedtime  isosorbide   mononitrate ER Tablet (IMDUR) 60 milliGRAM(s) Oral daily  metoprolol succinate  milliGRAM(s) Oral daily  ranolazine 500 milliGRAM(s) Oral two times a day  sacubitril 49 mG/valsartan 51 mG 1 Tablet(s) Oral two times a day  sodium bicarbonate 650 milliGRAM(s) Oral two times a day    MEDICATIONS  (PRN):  acetaminophen     Tablet .. 650 milliGRAM(s) Oral every 6 hours PRN Temp greater or equal to 38C (100.4F), Mild Pain (1 - 3)  aluminum hydroxide/magnesium hydroxide/simethicone Suspension 30 milliLiter(s) Oral every 4 hours PRN Dyspepsia  dextrose Oral Gel 15 Gram(s) Oral once PRN Blood Glucose LESS THAN 70 milliGRAM(s)/deciliter  melatonin 3 milliGRAM(s) Oral at bedtime PRN Insomnia  ondansetron Injectable 4 milliGRAM(s) IV Push every 8 hours PRN Nausea and/or Vomiting

## 2025-01-11 NOTE — PROGRESS NOTE ADULT - PROBLEM SELECTOR PLAN 2
- hx of CAD.   - LHC on 11/19/24 with critical ISR of RCA stent 99% and 95% OM1 with 95% [id number]-fa-gyjtlu LAD. No interventions at that time.   - c/w DAPT, ASA 81 mg PO daily and Plavix 75 mg PO daily.   - c/w metoprolol succinate  mg PO daily   - c/w high intensity statin, atorvastatin 80 mg PO daily   - pt has no anginal equivalent symptoms   - Plan for LHC on Monday, 1/13/2024 for ischemic evaluation. Keep NPO after midnight on 1/12/2025

## 2025-01-11 NOTE — PROGRESS NOTE ADULT - PROBLEM SELECTOR PLAN 1
- pt presented with fluid overload   - cardiomems reading 1/11: 11-15  - GDMT: c/w Lasix 40 mg IV push BID, transition to PO lasix tomorrow if euvolemic. c/w metoprolol succinate  mg po daily, and entresto 49/51 mg BID PO.   - Plan for LHC on Monday, 1/13/2025, for ischemic evaluation prior to attempting CRT upgrade by EP.   - EP evaluated for CRT upgrade , appreciate recs   - Monitor on telemetry.  Strict i/o and daily weights.  Keep K > 4, Mg > 2.  Monitor renal function with ongoing diuresis. - pt presented with fluid overload   - cardiomems reading 1/11: 11-15  - GDMT: currently on lasix 40 mg IV Push BID, will transition to PO lasix 40 mg BID.  c/w metoprolol succinate  mg po daily, and entresto 49/51 mg BID PO.   - Plan for LHC on Monday, 1/13/2025, for ischemic evaluation prior to attempting CRT upgrade by EP.   - EP evaluated for CRT upgrade , appreciate recs   - Monitor on telemetry.  Strict i/o and daily weights.  Keep K > 4, Mg > 2.  Monitor renal function with ongoing diuresis.

## 2025-01-12 LAB
ANION GAP SERPL CALC-SCNC: 14 MMOL/L — SIGNIFICANT CHANGE UP (ref 5–17)
BUN SERPL-MCNC: 22.1 MG/DL — HIGH (ref 8–20)
CALCIUM SERPL-MCNC: 7.8 MG/DL — LOW (ref 8.4–10.5)
CHLORIDE SERPL-SCNC: 106 MMOL/L — SIGNIFICANT CHANGE UP (ref 96–108)
CO2 SERPL-SCNC: 19 MMOL/L — LOW (ref 22–29)
CREAT SERPL-MCNC: 1.51 MG/DL — HIGH (ref 0.5–1.3)
CULTURE, WOUND AEROBE ANAEROBE: ABNORMAL
EGFR: 36 ML/MIN/1.73M2 — LOW
GLUCOSE BLDC GLUCOMTR-MCNC: 153 MG/DL — HIGH (ref 70–99)
GLUCOSE BLDC GLUCOMTR-MCNC: 160 MG/DL — HIGH (ref 70–99)
GLUCOSE BLDC GLUCOMTR-MCNC: 212 MG/DL — HIGH (ref 70–99)
GLUCOSE BLDC GLUCOMTR-MCNC: 75 MG/DL — SIGNIFICANT CHANGE UP (ref 70–99)
GLUCOSE SERPL-MCNC: 100 MG/DL — HIGH (ref 70–99)
MAGNESIUM SERPL-MCNC: 2.4 MG/DL — SIGNIFICANT CHANGE UP (ref 1.6–2.6)
POTASSIUM SERPL-MCNC: 4.3 MMOL/L — SIGNIFICANT CHANGE UP (ref 3.5–5.3)
POTASSIUM SERPL-SCNC: 4.3 MMOL/L — SIGNIFICANT CHANGE UP (ref 3.5–5.3)
SODIUM SERPL-SCNC: 139 MMOL/L — SIGNIFICANT CHANGE UP (ref 135–145)

## 2025-01-12 PROCEDURE — 99232 SBSQ HOSP IP/OBS MODERATE 35: CPT

## 2025-01-12 RX ORDER — INSULIN GLARGINE-YFGN 100 [IU]/ML
14 INJECTION, SOLUTION SUBCUTANEOUS AT BEDTIME
Refills: 0 | Status: COMPLETED | OUTPATIENT
Start: 2025-01-12 | End: 2025-01-12

## 2025-01-12 RX ORDER — INSULIN GLARGINE-YFGN 100 [IU]/ML
26 INJECTION, SOLUTION SUBCUTANEOUS AT BEDTIME
Refills: 0 | Status: DISCONTINUED | OUTPATIENT
Start: 2025-01-13 | End: 2025-01-14

## 2025-01-12 RX ADMIN — Medication 1: at 17:13

## 2025-01-12 RX ADMIN — HEPARIN SODIUM 5000 UNIT(S): 1000 INJECTION, SOLUTION INTRAVENOUS; SUBCUTANEOUS at 13:01

## 2025-01-12 RX ADMIN — SACUBITRIL AND VALSARTAN 1 TABLET(S): 24; 26 TABLET, FILM COATED ORAL at 17:12

## 2025-01-12 RX ADMIN — Medication 2: at 13:02

## 2025-01-12 RX ADMIN — ACETAMINOPHEN 650 MILLIGRAM(S): 80 SOLUTION/ DROPS ORAL at 23:42

## 2025-01-12 RX ADMIN — SODIUM BICARBONATE 650 MILLIGRAM(S): 84 INJECTION, SOLUTION INTRAVENOUS at 17:13

## 2025-01-12 RX ADMIN — ACETAMINOPHEN 650 MILLIGRAM(S): 80 SOLUTION/ DROPS ORAL at 17:12

## 2025-01-12 RX ADMIN — SACUBITRIL AND VALSARTAN 1 TABLET(S): 24; 26 TABLET, FILM COATED ORAL at 06:41

## 2025-01-12 RX ADMIN — HEPARIN SODIUM 5000 UNIT(S): 1000 INJECTION, SOLUTION INTRAVENOUS; SUBCUTANEOUS at 06:41

## 2025-01-12 RX ADMIN — Medication 100 MILLIGRAM(S): at 06:41

## 2025-01-12 RX ADMIN — Medication 60 MILLIGRAM(S): at 13:04

## 2025-01-12 RX ADMIN — HEPARIN SODIUM 5000 UNIT(S): 1000 INJECTION, SOLUTION INTRAVENOUS; SUBCUTANEOUS at 21:44

## 2025-01-12 RX ADMIN — CLOPIDOGREL BISULFATE 75 MILLIGRAM(S): 75 TABLET, FILM COATED ORAL at 13:04

## 2025-01-12 RX ADMIN — RANOLAZINE 500 MILLIGRAM(S): 1000 TABLET, FILM COATED, EXTENDED RELEASE ORAL at 17:13

## 2025-01-12 RX ADMIN — Medication 40 MILLIGRAM(S): at 13:04

## 2025-01-12 RX ADMIN — ATORVASTATIN CALCIUM 80 MILLIGRAM(S): 40 TABLET, FILM COATED ORAL at 21:45

## 2025-01-12 RX ADMIN — Medication 325 MILLIGRAM(S): at 13:04

## 2025-01-12 RX ADMIN — RANOLAZINE 500 MILLIGRAM(S): 1000 TABLET, FILM COATED, EXTENDED RELEASE ORAL at 06:41

## 2025-01-12 RX ADMIN — SODIUM BICARBONATE 650 MILLIGRAM(S): 84 INJECTION, SOLUTION INTRAVENOUS at 06:41

## 2025-01-12 RX ADMIN — INSULIN GLARGINE-YFGN 14 UNIT(S): 100 INJECTION, SOLUTION SUBCUTANEOUS at 21:44

## 2025-01-12 RX ADMIN — ACETAMINOPHEN 650 MILLIGRAM(S): 80 SOLUTION/ DROPS ORAL at 18:12

## 2025-01-12 RX ADMIN — Medication 40 MILLIGRAM(S): at 06:41

## 2025-01-12 RX ADMIN — GABAPENTIN 300 MILLIGRAM(S): 300 CAPSULE ORAL at 21:45

## 2025-01-12 RX ADMIN — Medication 81 MILLIGRAM(S): at 13:04

## 2025-01-12 NOTE — PROGRESS NOTE ADULT - NS ATTEND AMEND GEN_ALL_CORE FT
seen with above,      74F Faroese-speaking ( # [id number]) history significant for HTN, DM2, CKD 3, heart block s/p Micra PPM, CVA, CAD/stent, PAD, CVA, HFmrEF with recurrent hospitalization for HF in 10/8-10/14 and 11/16-11/21/24 underwent repeat LHC on 11/19/24 with critical ISR of RCA stent 99% and 95% OM1 with 95% vwi-tq-wlrgjo LAD no PCI perform yet for optimization of CHF, discharged on Lasix 40mg once daily with low dose Entresto had mild BRADY, interval with worsening dyspnea despite extra Lasix use prompted return to the ER and readmitted for ADHF, pBNP 22K (prior 16K) in 12/3/24, repeat Echo with LV EF 39%, subsequently underwent CardioMems implant 12/9 (PAD 17 and PCWP 11) was discharged on 12/20/24 with Lasix 40mg once daily but with fluid overload prompted readmission 1/7/25 with pBNP 40K      -repeat Mems improved 15-->14 compared to prior 24, switched IV Lasix 40mg to PO 40mg BID   -discussed with interventionalists given recurrent ADHF admissions to attempt PCI of the RCA, will plan for Monday if Cr. remains stable   -continue DAPT on clopidogrel, high dose statin   -switched amlodipine to Entresto for GDMT, eventual SGLT2i if Cr. stable after PCI, on metoprolol succinate 100mg   -EP eval. for CRT upgrade given chronic RV pacing 100% now with LV EF <50% and recurrent HF but requested revascularization of CAD first if still no improvement in LV EF   -updated patient's  and daughters at the bedside last week       Mendoza Pham DO, [facility]  Faculty Non-Invasive Cardiologist  [phone]. seen with above,      74F Syriac-speaking history significant for HTN, DM2, CKD 3, heart block s/p Micra PPM, CVA, CAD/stent, PAD, CVA, HFmrEF with recurrent hospitalization for HF in 10/8-10/14 and 11/16-11/21/24 underwent repeat LHC on 11/19/24 with critical ISR of RCA stent 99% and 95% OM1 with 95% xsd-sa-oeftis LAD no PCI perform yet for optimization of CHF, discharged on Lasix 40mg once daily with low dose Entresto had mild BRADY, interval with worsening dyspnea despite extra Lasix use prompted return to the ER and readmitted for ADHF, pBNP 22K (prior 16K) in 12/3/24, repeat Echo with LV EF 39%, subsequently underwent CardioMems implant 12/9 (PAD 17 and PCWP 11) was discharged on 12/20/24 with Lasix 40mg once daily but with fluid overload prompted readmission 1/7/25 with pBNP 40K      -repeat Mems improved 15-->14 compared to prior 24, switched IV Lasix 40mg to PO 40mg BID   -discussed with interventionalists given recurrent ADHF admissions to attempt PCI of the RCA, will plan for Monday if Cr. remains stable   -continue DAPT on clopidogrel, high dose statin   -switched amlodipine to Entresto for GDMT, eventual SGLT2i if Cr. stable after PCI, on metoprolol succinate 100mg   -EP eval. for CRT upgrade given chronic RV pacing 100% now with LV EF <50% and recurrent HF but requested revascularization of CAD first if still no improvement in LV EF   -updated patient's  and daughters at the bedside last week       Mendoza Pham DO, [facility]  Faculty Non-Invasive Cardiologist  [phone].

## 2025-01-12 NOTE — PROGRESS NOTE ADULT - SUBJECTIVE AND OBJECTIVE BOX
[facility] [place] [facility]                                                         CARDIOLOGY AT [place] [address]                                                         Telephone: [phone]. Fax:[phone]                                                                             PROGRESS NOTE    Reason for follow up: AoC HFrEF  Update: euvolemic    [id number]    Review of symptoms:   Cardiac:  No chest pain. No dyspnea. No palpitations.  Respiratory: no cough. No dyspnea  Gastrointestinal: No diarrhea. No abdominal pain. No bleeding.   Neuro: No focal neuro complaints.    Vitals:  T(C): 36.4 (01-12-25 @ 08:51), Max: 36.7 (01-11-25 @ 16:59)  HR: 66 (01-12-25 @ 08:51) (62 - 92)  BP: 115/72 (01-12-25 @ 08:51) (115/72 - 152/74)  RR: 18 (01-12-25 @ 08:51) (18 - 18)  SpO2: 98% (01-12-25 @ 08:51) (96% - 99%)  Wt(kg): --  I&O's Summary    11 Jan 2025 07:01  -  12 Jan 2025 07:00  --------------------------------------------------------  IN: 510 mL / OUT: 2600 mL / NET: -2090 mL    12 Jan 2025 07:01  -  12 Jan 2025 11:53  --------------------------------------------------------  IN: 210 mL / OUT: 600 mL / NET: -390 mL      Weight (kg): 73.5 (01-07 @ 14:24)    PHYSICAL EXAM:  Appearance: Comfortable. No acute distress  HEENT:  Atraumatic. Normocephalic.  Normal oral mucosa  Neurologic: A & O x 3, no gross focal deficits.  Cardiovascular: RRR S1 S2, No murmur, no rubs/gallops. No JVD  Respiratory: Lungs clear to auscultation, unlabored   Gastrointestinal:  Soft, Non-tender, + BS  Lower Extremities: 2+ Peripheral Pulses, No clubbing, cyanosis, or edema  Psychiatry: Patient is calm. No agitation.   Skin: warm and dry.    CURRENT CARDIAC MEDICATIONS:  furosemide    Tablet 40 milliGRAM(s) Oral two times a day  isosorbide   mononitrate ER Tablet (IMDUR) 60 milliGRAM(s) Oral daily  metoprolol succinate  milliGRAM(s) Oral daily  ranolazine 500 milliGRAM(s) Oral two times a day  sacubitril 49 mG/valsartan 51 mG 1 Tablet(s) Oral two times a day      CURRENT OTHER MEDICATIONS:  acetaminophen     Tablet .. 650 milliGRAM(s) Oral every 6 hours PRN Temp greater or equal to 38C (100.4F), Mild Pain (1 - 3)  gabapentin 300 milliGRAM(s) Oral at bedtime  melatonin 3 milliGRAM(s) Oral at bedtime PRN Insomnia  ondansetron Injectable 4 milliGRAM(s) IV Push every 8 hours PRN Nausea and/or Vomiting  aluminum hydroxide/magnesium hydroxide/simethicone Suspension 30 milliLiter(s) Oral every 4 hours PRN Dyspepsia  atorvastatin 80 milliGRAM(s) Oral at bedtime  dextrose 50% Injectable 25 Gram(s) IV Push once, Stop order after: 1 Doses  dextrose 50% Injectable 12.5 Gram(s) IV Push once, Stop order after: 1 Doses  dextrose 50% Injectable 25 Gram(s) IV Push once, Stop order after: 1 Doses  dextrose Oral Gel 15 Gram(s) Oral once, Stop order after: 1 Doses PRN Blood Glucose LESS THAN 70 milliGRAM(s)/deciliter  glucagon  Injectable 1 milliGRAM(s) IntraMuscular once, Stop order after: 1 Doses  insulin glargine Injectable (LANTUS) 26 Unit(s) SubCutaneous at bedtime  insulin lispro (ADMELOG) corrective regimen sliding scale   SubCutaneous three times a day before meals  insulin lispro (ADMELOG) corrective regimen sliding scale   SubCutaneous at bedtime  aspirin enteric coated 81 milliGRAM(s) Oral daily  clopidogrel Tablet 75 milliGRAM(s) Oral daily  dextrose 5%. 1000 milliLiter(s) (50 mL/Hr) IV Continuous <Continuous>  dextrose 5%. 1000 milliLiter(s) (100 mL/Hr) IV Continuous <Continuous>  ferrous    sulfate 325 milliGRAM(s) Oral daily  heparin   Injectable 5000 Unit(s) SubCutaneous every 8 hours  sodium bicarbonate 650 milliGRAM(s) Oral two times a day      LABS:	 	                            10.1   10.24 )-----------( 529      ( 11 Jan 2025 05:07 )             31.9     01-12    139  |  106  |  22.1[H]  ----------------------------<  100[H]  4.3   |  19.0[L]  |  1.51[H]    Ca    7.8[L]      12 Jan 2025 04:28  Mg     2.4     01-12        Lipid Profile:   HgA1c:   TSH:     TELEMETRY: paced       DIAGNOSTIC TESTING:  [ ] Echocardiogram:   < from: TTE W or WO Ultrasound Enhancing Agent (12.05.24 @ 21:37) >  CONCLUSIONS:      1. Technically difficult image quality.   2. Left ventricular cavity is normal in size. Left ventricular systolic function is moderately decreased with an ejection fraction of 39 % by Taylor's method of disks with an ejection fraction visually estimated at 35 to 40 %.   3. Multiple segmental abnormalities exist. See findings.   4. Unable to assess left ventricular diastolic function due to insufficient data.   5. Normal right ventricular cavity size and normal right ventricular systolic function.   6. Left atrium is normal in size.   7. Mild to moderate mitral regurgitation.   8. Mild tricuspid regurgitation.   9. Estimated pulmonary artery systolic pressure is 38 mmHg, consistent with mild pulmonary hypertension.  10. Trileaflet aortic valve. Fibrocalcific aortic valve sclerosis without stenosis.  11. Trace aortic regurgitation.  12. No pericardial effusion seen.  13. Compared to the transthoracic echocardiogram performed on 10/8/2024, there is further decline in left ventricular function, from 45-50% to 35-40%.    < end of copied text >    [ ]  Catheterization:    < from: Cardiac Catheterization (12.09.24 @ 15:55) >  Conclusions:   CARDIOMEMS Insertion     RHC performed via right CFV and CardioMEMS inserted to left pulmonary  artery  RHC: RA 3, RV 30/2/4, PA 29/14/17, Wedge 11. CO/CI 3.7, 2.19    Recommendations:     C/w medical management of HFrEF. Outpatient follow up for CAD.    Acute complication:    No complications     < end of copied text >                                                                [facility]                                                         CARDIOLOGY AT [place] [address]                                                         Telephone: [phone]. Fax:[phone]                                                                             PROGRESS NOTE    Reason for follow up: AoC HFrEF, Mems check 14 today   Update: euvolemic    [id number]    Review of symptoms:   Cardiac:  No chest pain. No dyspnea. No palpitations.  Respiratory: no cough. No dyspnea  Gastrointestinal: No diarrhea. No abdominal pain. No bleeding.   Neuro: No focal neuro complaints.    Vitals:  T(C): 36.4 (01-12-25 @ 08:51), Max: 36.7 (01-11-25 @ 16:59)  HR: 66 (01-12-25 @ 08:51) (62 - 92)  BP: 115/72 (01-12-25 @ 08:51) (115/72 - 152/74)  RR: 18 (01-12-25 @ 08:51) (18 - 18)  SpO2: 98% (01-12-25 @ 08:51) (96% - 99%)  Wt(kg): --  I&O's Summary    11 Jan 2025 07:01  -  12 Jan 2025 07:00  --------------------------------------------------------  IN: 510 mL / OUT: 2600 mL / NET: -2090 mL    12 Jan 2025 07:01  -  12 Jan 2025 11:53  --------------------------------------------------------  IN: 210 mL / OUT: 600 mL / NET: -390 mL      Weight (kg): 73.5 (01-07 @ 14:24)    PHYSICAL EXAM:  Appearance: Comfortable. No acute distress  HEENT:  Atraumatic. Normocephalic.  Normal oral mucosa  Neurologic: A & O x 3, no gross focal deficits.  Cardiovascular: RRR S1 S2, No murmur, no rubs/gallops. No JVD  Respiratory: Lungs clear to auscultation, unlabored   Gastrointestinal:  Soft, Non-tender, + BS  Lower Extremities: 2+ Peripheral Pulses, No clubbing, cyanosis, or edema  Psychiatry: Patient is calm. No agitation.   Skin: warm and dry.    CURRENT CARDIAC MEDICATIONS:  furosemide    Tablet 40 milliGRAM(s) Oral two times a day  isosorbide   mononitrate ER Tablet (IMDUR) 60 milliGRAM(s) Oral daily  metoprolol succinate  milliGRAM(s) Oral daily  ranolazine 500 milliGRAM(s) Oral two times a day  sacubitril 49 mG/valsartan 51 mG 1 Tablet(s) Oral two times a day      CURRENT OTHER MEDICATIONS:  acetaminophen     Tablet .. 650 milliGRAM(s) Oral every 6 hours PRN Temp greater or equal to 38C (100.4F), Mild Pain (1 - 3)  gabapentin 300 milliGRAM(s) Oral at bedtime  melatonin 3 milliGRAM(s) Oral at bedtime PRN Insomnia  ondansetron Injectable 4 milliGRAM(s) IV Push every 8 hours PRN Nausea and/or Vomiting  aluminum hydroxide/magnesium hydroxide/simethicone Suspension 30 milliLiter(s) Oral every 4 hours PRN Dyspepsia  atorvastatin 80 milliGRAM(s) Oral at bedtime  dextrose 50% Injectable 25 Gram(s) IV Push once, Stop order after: 1 Doses  dextrose 50% Injectable 12.5 Gram(s) IV Push once, Stop order after: 1 Doses  dextrose 50% Injectable 25 Gram(s) IV Push once, Stop order after: 1 Doses  dextrose Oral Gel 15 Gram(s) Oral once, Stop order after: 1 Doses PRN Blood Glucose LESS THAN 70 milliGRAM(s)/deciliter  glucagon  Injectable 1 milliGRAM(s) IntraMuscular once, Stop order after: 1 Doses  insulin glargine Injectable (LANTUS) 26 Unit(s) SubCutaneous at bedtime  insulin lispro (ADMELOG) corrective regimen sliding scale   SubCutaneous three times a day before meals  insulin lispro (ADMELOG) corrective regimen sliding scale   SubCutaneous at bedtime  aspirin enteric coated 81 milliGRAM(s) Oral daily  clopidogrel Tablet 75 milliGRAM(s) Oral daily  dextrose 5%. 1000 milliLiter(s) (50 mL/Hr) IV Continuous <Continuous>  dextrose 5%. 1000 milliLiter(s) (100 mL/Hr) IV Continuous <Continuous>  ferrous    sulfate 325 milliGRAM(s) Oral daily  heparin   Injectable 5000 Unit(s) SubCutaneous every 8 hours  sodium bicarbonate 650 milliGRAM(s) Oral two times a day      LABS:	 	                            10.1   10.24 )-----------( 529      ( 11 Jan 2025 05:07 )             31.9     01-12    139  |  106  |  22.1[H]  ----------------------------<  100[H]  4.3   |  19.0[L]  |  1.51[H]    Ca    7.8[L]      12 Jan 2025 04:28  Mg     2.4     01-12        Lipid Profile:   HgA1c:   TSH:     TELEMETRY: paced       DIAGNOSTIC TESTING:  [ ] Echocardiogram:   < from: TTE W or WO Ultrasound Enhancing Agent (12.05.24 @ 21:37) >  CONCLUSIONS:      1. Technically difficult image quality.   2. Left ventricular cavity is normal in size. Left ventricular systolic function is moderately decreased with an ejection fraction of 39 % by Taylor's method of disks with an ejection fraction visually estimated at 35 to 40 %.   3. Multiple segmental abnormalities exist. See findings.   4. Unable to assess left ventricular diastolic function due to insufficient data.   5. Normal right ventricular cavity size and normal right ventricular systolic function.   6. Left atrium is normal in size.   7. Mild to moderate mitral regurgitation.   8. Mild tricuspid regurgitation.   9. Estimated pulmonary artery systolic pressure is 38 mmHg, consistent with mild pulmonary hypertension.  10. Trileaflet aortic valve. Fibrocalcific aortic valve sclerosis without stenosis.  11. Trace aortic regurgitation.  12. No pericardial effusion seen.  13. Compared to the transthoracic echocardiogram performed on 10/8/2024, there is further decline in left ventricular function, from 45-50% to 35-40%.    < end of copied text >    [ ]  Catheterization:    < from: Cardiac Catheterization (12.09.24 @ 15:55) >  Conclusions:   CARDIOMEMS Insertion     RHC performed via right CFV and CardioMEMS inserted to left pulmonary  artery  RHC: RA 3, RV 30/2/4, PA 29/14/17, Wedge 11. CO/CI 3.7, 2.19    Recommendations:     C/w medical management of HFrEF. Outpatient follow up for CAD.    Acute complication:    No complications     < end of copied text >

## 2025-01-12 NOTE — PROGRESS NOTE ADULT - ASSESSMENT
74F Indonesian-speaking history significant for HTN, DM2, CKD 3, heart block s/p Micra PPM, CVA, CAD/stent, PAD, CVA, HFmrEF with recurrent hospitalization for HF in 10/8-10/14 and 11/16-11/21/24 underwent repeat LHC on 11/19/24 with critical ISR of RCA stent 99% and 95% OM1 with 95% [id number] LAD no PCI perform yet for optimization of CHF, discharged on Lasix 40mg once daily with low dose Entresto had mild BRADY, interval with worsening dyspnea despite extra Lasix use prompted return to the ER and readmitted for ADHF, pBNP 22K (prior 16K) in 12/3/24, repeat Echo with LV EF 39%, subsequently underwent CardioMems implant 12/9 (PAD 17 and PCWP 11) was discharged on 12/20/24 with Lasix 40mg once daily but with fluid overload prompted readmission 1/7/25 with pBNP 40K.

## 2025-01-12 NOTE — PROGRESS NOTE ADULT - ASSESSMENT
73 y/o female with PMH of HTN, HLD, CAD s/p PCI, HFrEF, Mobitz II s/p PPM (MD NereydaT), PAD with chronic right LE wound, CVA, DM2, CKD3 came to the ED complaining of shortness of breath and fatigue x 4 days. Patient endorsed orthopnea, burning in her chest.     Acute on Chronic Systolic Heart Failure   ECHO with new decrease in EF  Cath on Monday   Strict intake / output and daily weight   IV Lasix was changed to PO 40mg BID on 1/11   Continue Metoprolol / Imdur / Entresto  Cardiology recs appreciated  Saturating well on room air    CAD / HLD / HTN  Aspirin 81mg   Plavix 75mg   Atorvastatin 80mg   Ranolazine 500mg bid   Imdur 60mg   Metoprolol ER 100mg   Cath on Monday   Cardio following     DM-2   A1c 7.1 on 10/9  Hold Metformin 1000mg bid   Lantus 26 units HS (will give 14 units tonight as patient will be NPO after midnight)   FS with ISS    CKD-3   Stable   Monitor renal function     Anemia   Likely of chronic disease   Hb stable   Iron tab (as per granddaughter, she now only takes it daily instead of tid)   Monitor CBC    Right Foot Wound  Podiatry recs appreciated     DVT ppx - Heparin SQ    Dispo: Home likely on Tuesday.

## 2025-01-12 NOTE — PROGRESS NOTE ADULT - TIME BILLING
Coordinating care.
Acute on chronic HFrEF, CAD, HB/PPM
Acute on chronic HFrEF, CAD, CHB/RV pacing
Acute on chronic HFrEF, CAD, CHB
Acute on chronic HFrEF, CAD, CKD

## 2025-01-12 NOTE — PROGRESS NOTE ADULT - PROBLEM SELECTOR PLAN 2
-Known critical stenosis on [facility] from 11/2024. RCA stent 99% and 95% OM1 with 95% [id number]-otwwdj LAD. No intervention at that time due to lack of symptoms   -Noted elevated troponin, flat in nature  -Had some reproducible CP this AM  -C/w BB, DAPT, statin, imdur   -Plan for [facility] tomorrow prior to CRT implant

## 2025-01-12 NOTE — PROGRESS NOTE ADULT - PROBLEM SELECTOR PLAN 1
-ICM  -Euvolemic  -BNP from 40K -> 29K  -Cardio mems today 14. Yesterday 11-15  -Net negative  -C/w lasix, BB,ELVIS

## 2025-01-12 NOTE — PROGRESS NOTE ADULT - SUBJECTIVE AND OBJECTIVE BOX
Heart failure    HPI:  Patient seen and examined before midnight.     75 y/o female with PMH of HTN, HLD, CAD s/p PCI, HFrEF, Mobitz II s/p PPM (MD NereydaT), PAD with chronic right LE wound, CVA, DM2, CKD3 came to the ED complaining of shortness of breath and fatigue x 4 days. Patient said "my body is full of water". As per son (Balaji) and granddaughter (Lexi) via phone, patient was discharged about 2 weeks ago and was told to use Lasix PRN. They reported patient gained 11lb in 4 days, cardiology called saying to give her Lasix 80mg which was given in the last 2 days. Patient endorsed orthopnea, burning in her chest. She has no fever, chills, nausea, vomiting, palpitation, abdominal pain, change in bowel/urinary habit, fall, HA, dizziness.   (07 Jan 2025 21:32)    Interval History:  Patient was seen and examined at bedside around 10 am with  - Phuc.   Has some musculoskeletal chest pain on left side.   Denies palpitations.   Breathing and swelling in leg have improved.      ROS:  As per interval history otherwise unremarkable.    PHYSICAL EXAM:  Vital Signs   T(C): 36.7 (12 Jan 2025 12:55), Max: 36.7 (11 Jan 2025 16:59)  T(F): 98 (12 Jan 2025 12:55), Max: 98.1 (11 Jan 2025 16:59)  HR: 70 (12 Jan 2025 12:55) (62 - 71)  BP: 122/76 (12 Jan 2025 12:55) (115/72 - 142/68)  BP(mean): 93 (11 Jan 2025 21:52) (86 - 93)  RR: 18 (12 Jan 2025 12:55) (18 - 18)  SpO2: 96% (12 Jan 2025 12:55) (96% - 99%)  Parameters below as of 12 Jan 2025 12:55  Patient On (Oxygen Delivery Method): room air  General: Elderly female lying in bed comfortably. No acute distress  HEENT: EOMI. Clear conjunctivae. Moist mucus membrane  Neck: Supple.   Chest: Good air entry. No wheezing, rales or rhonchi.   Heart: Normal S1 & S2. RRR.   Abdomen: Non distended. Soft. Non-tender. + BS  Ext: No pedal edema. No calf tenderness. Chronic skin changes. Dressing on right foot.   Neuro: Awake and alert. No focal deficit. Speech clear.   Skin: Warm and Dry  Psychiatry: Normal mood and affect    I&O's Summary    11 Jan 2025 07:01  -  12 Jan 2025 07:00  --------------------------------------------------------  IN: 510 mL / OUT: 2600 mL / NET: -2090 mL    12 Jan 2025 07:01  -  12 Jan 2025 15:46  --------------------------------------------------------  IN: 432 mL / OUT: 1000 mL / NET: -568 mL    LABS:  CAPILLARY BLOOD GLUCOSE  POCT Blood Glucose.: 212 mg/dL (12 Jan 2025 12:19)  POCT Blood Glucose.: 75 mg/dL (12 Jan 2025 08:18)  POCT Blood Glucose.: 131 mg/dL (11 Jan 2025 21:02)  POCT Blood Glucose.: 158 mg/dL (11 Jan 2025 17:28)                      10.1   10.24 )-----------( 529      ( 11 Jan 2025 05:07 )             31.9     01-12    139  |  106  |  22.1[H]  ----------------------------<  100[H]  4.3   |  19.0[L]  |  1.51[H]    Ca    7.8[L]      12 Jan 2025 04:28  Mg     2.4     01-12    RADIOLOGY & ADDITIONAL STUDIES:  Reviewed     MEDICATIONS  (STANDING):  aspirin enteric coated 81 milliGRAM(s) Oral daily  atorvastatin 80 milliGRAM(s) Oral at bedtime  clopidogrel Tablet 75 milliGRAM(s) Oral daily  dextrose 5%. 1000 milliLiter(s) (50 mL/Hr) IV Continuous <Continuous>  dextrose 5%. 1000 milliLiter(s) (100 mL/Hr) IV Continuous <Continuous>  dextrose 50% Injectable 25 Gram(s) IV Push once  dextrose 50% Injectable 12.5 Gram(s) IV Push once  dextrose 50% Injectable 25 Gram(s) IV Push once  ferrous    sulfate 325 milliGRAM(s) Oral daily  furosemide    Tablet 40 milliGRAM(s) Oral two times a day  gabapentin 300 milliGRAM(s) Oral at bedtime  glucagon  Injectable 1 milliGRAM(s) IntraMuscular once  heparin   Injectable 5000 Unit(s) SubCutaneous every 8 hours  insulin glargine Injectable (LANTUS) 26 Unit(s) SubCutaneous at bedtime  insulin lispro (ADMELOG) corrective regimen sliding scale   SubCutaneous three times a day before meals  insulin lispro (ADMELOG) corrective regimen sliding scale   SubCutaneous at bedtime  isosorbide   mononitrate ER Tablet (IMDUR) 60 milliGRAM(s) Oral daily  metoprolol succinate  milliGRAM(s) Oral daily  ranolazine 500 milliGRAM(s) Oral two times a day  sacubitril 49 mG/valsartan 51 mG 1 Tablet(s) Oral two times a day  sodium bicarbonate 650 milliGRAM(s) Oral two times a day    MEDICATIONS  (PRN):  acetaminophen     Tablet .. 650 milliGRAM(s) Oral every 6 hours PRN Temp greater or equal to 38C (100.4F), Mild Pain (1 - 3)  aluminum hydroxide/magnesium hydroxide/simethicone Suspension 30 milliLiter(s) Oral every 4 hours PRN Dyspepsia  dextrose Oral Gel 15 Gram(s) Oral once PRN Blood Glucose LESS THAN 70 milliGRAM(s)/deciliter  melatonin 3 milliGRAM(s) Oral at bedtime PRN Insomnia  ondansetron Injectable 4 milliGRAM(s) IV Push every 8 hours PRN Nausea and/or Vomiting

## 2025-01-13 ENCOUNTER — TRANSCRIPTION ENCOUNTER (OUTPATIENT)
Age: 75
End: 2025-01-13

## 2025-01-13 LAB
ANION GAP SERPL CALC-SCNC: 16 MMOL/L — SIGNIFICANT CHANGE UP (ref 5–17)
BUN SERPL-MCNC: 34.1 MG/DL — HIGH (ref 8–20)
CALCIUM SERPL-MCNC: 7.2 MG/DL — LOW (ref 8.4–10.5)
CHLORIDE SERPL-SCNC: 106 MMOL/L — SIGNIFICANT CHANGE UP (ref 96–108)
CO2 SERPL-SCNC: 17 MMOL/L — LOW (ref 22–29)
CREAT SERPL-MCNC: 1.71 MG/DL — HIGH (ref 0.5–1.3)
EGFR: 31 ML/MIN/1.73M2 — LOW
GLUCOSE BLDC GLUCOMTR-MCNC: 185 MG/DL — HIGH (ref 70–99)
GLUCOSE BLDC GLUCOMTR-MCNC: 196 MG/DL — HIGH (ref 70–99)
GLUCOSE SERPL-MCNC: 244 MG/DL — HIGH (ref 70–99)
MAGNESIUM SERPL-MCNC: 2.3 MG/DL — SIGNIFICANT CHANGE UP (ref 1.8–2.6)
MRSA PCR RESULT.: SIGNIFICANT CHANGE UP
NT-PROBNP SERPL-SCNC: HIGH PG/ML (ref 0–300)
POTASSIUM SERPL-MCNC: 4.6 MMOL/L — SIGNIFICANT CHANGE UP (ref 3.5–5.3)
POTASSIUM SERPL-SCNC: 4.6 MMOL/L — SIGNIFICANT CHANGE UP (ref 3.5–5.3)
S AUREUS DNA NOSE QL NAA+PROBE: SIGNIFICANT CHANGE UP
SODIUM SERPL-SCNC: 138 MMOL/L — SIGNIFICANT CHANGE UP (ref 135–145)

## 2025-01-13 PROCEDURE — 99232 SBSQ HOSP IP/OBS MODERATE 35: CPT | Mod: 57

## 2025-01-13 PROCEDURE — 92978 ENDOLUMINL IVUS OCT C 1ST: CPT | Mod: 26,RC

## 2025-01-13 PROCEDURE — 99233 SBSQ HOSP IP/OBS HIGH 50: CPT | Mod: 25

## 2025-01-13 PROCEDURE — 99232 SBSQ HOSP IP/OBS MODERATE 35: CPT

## 2025-01-13 PROCEDURE — 99152 MOD SED SAME PHYS/QHP 5/>YRS: CPT

## 2025-01-13 PROCEDURE — 92933 PRQ TRLML C ATHRC ST ANGIOP1: CPT | Mod: RC

## 2025-01-13 PROCEDURE — 93010 ELECTROCARDIOGRAM REPORT: CPT

## 2025-01-13 RX ORDER — SODIUM CHLORIDE 9 MG/ML
250 INJECTION, SOLUTION INTRAMUSCULAR; INTRAVENOUS; SUBCUTANEOUS ONCE
Refills: 0 | Status: COMPLETED | OUTPATIENT
Start: 2025-01-13 | End: 2025-01-13

## 2025-01-13 RX ORDER — CLOPIDOGREL BISULFATE 75 MG/1
300 TABLET, FILM COATED ORAL ONCE
Refills: 0 | Status: COMPLETED | OUTPATIENT
Start: 2025-01-13 | End: 2025-01-13

## 2025-01-13 RX ORDER — CHLORHEXIDINE GLUCONATE 1.2 MG/ML
1 RINSE ORAL
Refills: 0 | Status: DISCONTINUED | OUTPATIENT
Start: 2025-01-13 | End: 2025-01-14

## 2025-01-13 RX ORDER — CALCIUM GLUCONATE 94 MG/ML
2 INJECTION, SOLUTION INTRAVENOUS EVERY 6 HOURS
Refills: 0 | Status: COMPLETED | OUTPATIENT
Start: 2025-01-13 | End: 2025-01-13

## 2025-01-13 RX ADMIN — SODIUM BICARBONATE 650 MILLIGRAM(S): 84 INJECTION, SOLUTION INTRAVENOUS at 05:38

## 2025-01-13 RX ADMIN — GABAPENTIN 300 MILLIGRAM(S): 300 CAPSULE ORAL at 22:22

## 2025-01-13 RX ADMIN — ONDANSETRON 4 MILLIGRAM(S): 4 TABLET ORAL at 22:21

## 2025-01-13 RX ADMIN — HEPARIN SODIUM 5000 UNIT(S): 1000 INJECTION, SOLUTION INTRAVENOUS; SUBCUTANEOUS at 22:21

## 2025-01-13 RX ADMIN — CLOPIDOGREL BISULFATE 300 MILLIGRAM(S): 75 TABLET, FILM COATED ORAL at 09:09

## 2025-01-13 RX ADMIN — ACETAMINOPHEN 650 MILLIGRAM(S): 80 SOLUTION/ DROPS ORAL at 17:00

## 2025-01-13 RX ADMIN — CLOPIDOGREL BISULFATE 75 MILLIGRAM(S): 75 TABLET, FILM COATED ORAL at 05:39

## 2025-01-13 RX ADMIN — RANOLAZINE 500 MILLIGRAM(S): 1000 TABLET, FILM COATED, EXTENDED RELEASE ORAL at 05:38

## 2025-01-13 RX ADMIN — SODIUM CHLORIDE 250 MILLILITER(S): 9 INJECTION, SOLUTION INTRAMUSCULAR; INTRAVENOUS; SUBCUTANEOUS at 09:09

## 2025-01-13 RX ADMIN — SACUBITRIL AND VALSARTAN 1 TABLET(S): 24; 26 TABLET, FILM COATED ORAL at 05:39

## 2025-01-13 RX ADMIN — SACUBITRIL AND VALSARTAN 1 TABLET(S): 24; 26 TABLET, FILM COATED ORAL at 17:42

## 2025-01-13 RX ADMIN — ACETAMINOPHEN 650 MILLIGRAM(S): 80 SOLUTION/ DROPS ORAL at 00:30

## 2025-01-13 RX ADMIN — Medication 40 MILLIGRAM(S): at 05:39

## 2025-01-13 RX ADMIN — HEPARIN SODIUM 5000 UNIT(S): 1000 INJECTION, SOLUTION INTRAVENOUS; SUBCUTANEOUS at 05:38

## 2025-01-13 RX ADMIN — CALCIUM GLUCONATE 200 GRAM(S): 94 INJECTION, SOLUTION INTRAVENOUS at 22:22

## 2025-01-13 RX ADMIN — ACETAMINOPHEN 650 MILLIGRAM(S): 80 SOLUTION/ DROPS ORAL at 22:33

## 2025-01-13 RX ADMIN — RANOLAZINE 500 MILLIGRAM(S): 1000 TABLET, FILM COATED, EXTENDED RELEASE ORAL at 17:42

## 2025-01-13 RX ADMIN — Medication 60 MILLIGRAM(S): at 15:57

## 2025-01-13 RX ADMIN — Medication 81 MILLIGRAM(S): at 05:38

## 2025-01-13 RX ADMIN — Medication 1: at 17:43

## 2025-01-13 RX ADMIN — SODIUM CHLORIDE 250 MILLILITER(S): 9 INJECTION, SOLUTION INTRAMUSCULAR; INTRAVENOUS; SUBCUTANEOUS at 15:12

## 2025-01-13 RX ADMIN — Medication 40 MILLIGRAM(S): at 15:57

## 2025-01-13 RX ADMIN — SODIUM BICARBONATE 650 MILLIGRAM(S): 84 INJECTION, SOLUTION INTRAVENOUS at 17:42

## 2025-01-13 RX ADMIN — CHLORHEXIDINE GLUCONATE 1 APPLICATION(S): 1.2 RINSE ORAL at 05:38

## 2025-01-13 RX ADMIN — Medication 325 MILLIGRAM(S): at 15:57

## 2025-01-13 RX ADMIN — ACETAMINOPHEN 650 MILLIGRAM(S): 80 SOLUTION/ DROPS ORAL at 23:30

## 2025-01-13 RX ADMIN — ATORVASTATIN CALCIUM 80 MILLIGRAM(S): 40 TABLET, FILM COATED ORAL at 22:22

## 2025-01-13 RX ADMIN — INSULIN GLARGINE-YFGN 26 UNIT(S): 100 INJECTION, SOLUTION SUBCUTANEOUS at 22:42

## 2025-01-13 RX ADMIN — Medication 100 MILLIGRAM(S): at 05:38

## 2025-01-13 RX ADMIN — CALCIUM GLUCONATE 200 GRAM(S): 94 INJECTION, SOLUTION INTRAVENOUS at 15:53

## 2025-01-13 RX ADMIN — ACETAMINOPHEN 650 MILLIGRAM(S): 80 SOLUTION/ DROPS ORAL at 16:00

## 2025-01-13 NOTE — DISCHARGE NOTE PROVIDER - ATTENDING DISCHARGE PHYSICAL EXAMINATION:
Recall colonoscopy pulled from [facility], 1st letter mailed  colon hx polyps, poor prep 3yr 8/7/2018   Vital Signs   T(C): 36.4 (14 Jan 2025 08:11), Max: 36.8 (14 Jan 2025 00:22)  T(F): 97.5 (14 Jan 2025 08:11), Max: 98.2 (14 Jan 2025 00:22)  HR: 69 (14 Jan 2025 08:11) (65 - 78)  BP: 134/77 (14 Jan 2025 08:11) (102/60 - 156/80)  BP(mean): 90 (14 Jan 2025 05:20) (80 - 106)  RR: 18 (14 Jan 2025 08:11) (16 - 20)  SpO2: 96% (14 Jan 2025 08:11) (94% - 99%)  Parameters below as of 14 Jan 2025 08:11  Patient On (Oxygen Delivery Method): room air  General: Elderly female lying in bed comfortably. No acute distress  HEENT: EOMI. Clear conjunctivae. Moist mucus membrane  Neck: Supple.   Chest: Good air entry. No wheezing, rales or rhonchi.   Heart: Normal S1 & S2. RRR.   Abdomen: Non distended. Soft. Non-tender. + BS  Ext: No pedal edema. No calf tenderness. Chronic skin changes. Dressing on right foot.   Neuro: Awake and alert. No focal deficit. Speech clear.   Skin: Warm and Dry  Psychiatry: Normal mood and affect

## 2025-01-13 NOTE — PROGRESS NOTE ADULT - SUBJECTIVE AND OBJECTIVE BOX
Heart failure    HPI:  Patient seen and examined before midnight.     75 y/o female with PMH of HTN, HLD, CAD s/p PCI, HFrEF, Mobitz II s/p PPM (MD NereydaT), PAD with chronic right LE wound, CVA, DM2, CKD3 came to the ED complaining of shortness of breath and fatigue x 4 days. Patient said "my body is full of water". As per son (Balaji) and granddaughter (Lexi) via phone, patient was discharged about 2 weeks ago and was told to use Lasix PRN. They reported patient gained 11lb in 4 days, cardiology called saying to give her Lasix 80mg which was given in the last 2 days. Patient endorsed orthopnea, burning in her chest. She has no fever, chills, nausea, vomiting, palpitation, abdominal pain, change in bowel/urinary habit, fall, HA, dizziness.   (07 Jan 2025 21:32)    Interval History:  Patient was seen and examined in cath lab. Family helped with translation.  Was having some headache. No dizziness or visual symptoms.   No chest pain, palpitations or shortness of breath.      ROS:  As per interval history otherwise unremarkable.    PHYSICAL EXAM:  Vital Signs   T(C): 36.4 (13 Jan 2025 07:19), Max: 36.9 (12 Jan 2025 23:55)  T(F): 97.6 (13 Jan 2025 07:19), Max: 98.4 (12 Jan 2025 23:55)  HR: 76 (13 Jan 2025 15:00) (62 - 81)  BP: 132/72 (13 Jan 2025 14:45) (89/66 - 142/65)  RR: 17 (13 Jan 2025 15:00) (16 - 18)  SpO2: 97% (13 Jan 2025 15:00) (95% - 98%)  Parameters below as of 13 Jan 2025 15:00  Patient On (Oxygen Delivery Method): room air  General: Elderly female lying in bed comfortably. No acute distress  HEENT: EOMI. Clear conjunctivae. Moist mucus membrane  Neck: Supple.   Chest: Good air entry. No wheezing, rales or rhonchi.   Heart: Normal S1 & S2. RRR.   Abdomen: Non distended. Soft. Non-tender. + BS  Ext: No pedal edema. No calf tenderness. Chronic skin changes. Dressing on right foot.   Neuro: Awake and alert. No focal deficit. Speech clear.   Skin: Warm and Dry  Psychiatry: Normal mood and affect    I&O's Summary    12 Jan 2025 07:01  -  13 Jan 2025 07:00  --------------------------------------------------------  IN: 964 mL / OUT: 1800 mL / NET: -836 mL    LABS:  CAPILLARY BLOOD GLUCOSE  POCT Blood Glucose.: 153 mg/dL (12 Jan 2025 20:48)  POCT Blood Glucose.: 160 mg/dL (12 Jan 2025 17:11)  01-13    138  |  106  |  34.1[H]  ----------------------------<  244[H]  4.6   |  17.0[L]  |  1.71[H]    Ca    7.2[L]      13 Jan 2025 04:16  Mg     2.3     01-13    RADIOLOGY & ADDITIONAL STUDIES:  Reviewed     MEDICATIONS  (STANDING):  aspirin enteric coated 81 milliGRAM(s) Oral daily  atorvastatin 80 milliGRAM(s) Oral at bedtime  calcium gluconate IVPB 2 Gram(s) IV Intermittent every 6 hours  chlorhexidine 2% Cloths 1 Application(s) Topical <User Schedule>  clopidogrel Tablet 75 milliGRAM(s) Oral daily  dextrose 5%. 1000 milliLiter(s) (50 mL/Hr) IV Continuous <Continuous>  dextrose 5%. 1000 milliLiter(s) (100 mL/Hr) IV Continuous <Continuous>  dextrose 50% Injectable 25 Gram(s) IV Push once  dextrose 50% Injectable 12.5 Gram(s) IV Push once  dextrose 50% Injectable 25 Gram(s) IV Push once  ferrous    sulfate 325 milliGRAM(s) Oral daily  furosemide    Tablet 40 milliGRAM(s) Oral two times a day  gabapentin 300 milliGRAM(s) Oral at bedtime  glucagon  Injectable 1 milliGRAM(s) IntraMuscular once  heparin   Injectable 5000 Unit(s) SubCutaneous every 8 hours  insulin glargine Injectable (LANTUS) 26 Unit(s) SubCutaneous at bedtime  insulin lispro (ADMELOG) corrective regimen sliding scale   SubCutaneous three times a day before meals  insulin lispro (ADMELOG) corrective regimen sliding scale   SubCutaneous at bedtime  isosorbide   mononitrate ER Tablet (IMDUR) 60 milliGRAM(s) Oral daily  metoprolol succinate  milliGRAM(s) Oral daily  ranolazine 500 milliGRAM(s) Oral two times a day  sacubitril 49 mG/valsartan 51 mG 1 Tablet(s) Oral two times a day  sodium bicarbonate 650 milliGRAM(s) Oral two times a day    MEDICATIONS  (PRN):  acetaminophen     Tablet .. 650 milliGRAM(s) Oral every 6 hours PRN Temp greater or equal to 38C (100.4F), Mild Pain (1 - 3)  aluminum hydroxide/magnesium hydroxide/simethicone Suspension 30 milliLiter(s) Oral every 4 hours PRN Dyspepsia  dextrose Oral Gel 15 Gram(s) Oral once PRN Blood Glucose LESS THAN 70 milliGRAM(s)/deciliter  melatonin 3 milliGRAM(s) Oral at bedtime PRN Insomnia  ondansetron Injectable 4 milliGRAM(s) IV Push every 8 hours PRN Nausea and/or Vomiting

## 2025-01-13 NOTE — PROGRESS NOTE ADULT - PROBLEM SELECTOR PLAN 1
-ICM  -Euvolemic  -BNP from 40K -> 29K  -Cardio mems 1/12 was 14. 1/11 was 11-15  -Net negative  -C/w lasix, BB,ELVIS.

## 2025-01-13 NOTE — PROGRESS NOTE ADULT - ASSESSMENT
74F German-speaking history significant for HTN, DM2, CKD 3, heart block s/p Micra PPM, CVA, CAD/stent, PAD, CVA, HFmrEF with recurrent hospitalization for HF in 10/8-10/14 and 11/16-11/21/24 underwent repeat LHC on 11/19/24 with critical ISR of RCA stent 99% and 95% OM1 with 95% [id number] LAD no PCI perform yet for optimization of CHF, discharged on Lasix 40mg once daily with low dose Entresto had mild BRADY, interval with worsening dyspnea despite extra Lasix use prompted return to the ER and readmitted for ADHF, pBNP 22K (prior 16K) in 12/3/24, repeat Echo with LV EF 39%, subsequently underwent CardioMems implant 12/9 (PAD 17 and PCWP 11) was discharged on 12/20/24 with Lasix 40mg once daily but with fluid overload prompted readmission 1/7/25 with pBNP 40K.

## 2025-01-13 NOTE — DISCHARGE NOTE PROVIDER - NSDCMRMEDTOKEN_GEN_ALL_CORE_FT
amLODIPine 10 mg oral tablet: 1 tab(s) orally once a day  aspirin 81 mg oral delayed release tablet: 1 tab(s) orally once a day  atorvastatin 80 mg oral tablet: 1 tab(s) orally once a day (at bedtime)  clopidogrel 75 mg oral tablet: 1 tab(s) orally once a day  furosemide 40 mg oral tablet: 1 tab(s) orally once a day as needed for As per Cardiomems data  gabapentin 800 mg oral tablet: 1 tab(s) orally once a day  insulin glargine 100 units/mL subcutaneous solution: 26 unit(s) subcutaneous once a day (at bedtime)  isosorbide mononitrate 30 mg oral tablet, extended release: 1 tab(s) orally once a day  metFORMIN 1000 mg oral tablet: 1 tab(s) orally 2 times a day (with meals)  metoprolol succinate 100 mg oral tablet, extended release: 1 tab(s) orally once a day  pantoprazole 20 mg oral delayed release tablet: 1 tab(s) orally once a day  ranolazine 500 mg oral tablet, extended release: 1 tab(s) orally 2 times a day  sodium bicarbonate 650 mg oral tablet: 2 tab(s) orally 2 times a day  sodium zirconium cyclosilicate 10 g oral powder for reconstitution: 1 packet(s) orally once a day  valsartan 160 mg oral tablet: 1 tab(s) orally once a day   aspirin 81 mg oral delayed release tablet: 1 tab(s) orally once a day  atorvastatin 80 mg oral tablet: 1 tab(s) orally once a day (at bedtime)  clopidogrel 75 mg oral tablet: 1 tab(s) orally once a day  ferrous sulfate 325 mg (65 mg elemental iron) oral tablet: 1 tab(s) orally once a day  furosemide 40 mg oral tablet: 1 tab(s) orally 2 times a day  gabapentin 800 mg oral tablet: 0.5 tab(s) orally once a day  insulin glargine 100 units/mL subcutaneous solution: 26 unit(s) subcutaneous once a day (at bedtime)  isosorbide mononitrate 60 mg oral tablet, extended release: 1 tab(s) orally once a day  metoprolol succinate 100 mg oral tablet, extended release: 1 tab(s) orally once a day  pantoprazole 20 mg oral delayed release tablet: 1 tab(s) orally once a day  ranolazine 500 mg oral tablet, extended release: 1 tab(s) orally 2 times a day  sacubitril-valsartan 49 mg-51 mg oral tablet: 1 tab(s) orally 2 times a day  sodium bicarbonate 650 mg oral tablet: 2 tab(s) orally 2 times a day

## 2025-01-13 NOTE — CHART NOTE - NSCHARTNOTEFT_GEN_A_CORE
Now s/p LHC via RFA with Dr. Sánchez, tolerated procedure well. Pt arrived to recovery in NAD and HDS, access site stable, no bleed/hematoma, distal pulse +,   Intraprocedurally: Rotational atherectomy of RCA heavily calcified treated with balloon angioplasty and successful placement of 3 ZEINAB: prox 4.5x18mm, mid 4.0x38mm, distal 3.5x34mm  Medications:  Fentanyl: 25mcg  Versed: 0.5mg  Heparin: 7000 units   Omnipaque: 70ml  Closure device: manual   Post Cath EKG: paced 64bpm    - Pt is already in-patient  -post cardiac cath orders  -groin precautions  -check ACT at 1300, remove sheath if <180, then may raise HOB 30 degrees 1 hour post hemostasis, 45 degrees 2 hours post hemostasis, OOB 4 hours   -EKG post cath  -labs and EKG in am  -NS 0.9% 250ml/hr x 1 bolus: post procedure SALO ppx   -continue current medical therapy  -Dual anti platelet therapy with aspirin/ plavix, strict adherence discussed with patient and son  -beta blocker: metoprolol succ 100mg   -continue Entresto with close monitorin of renal function  -high intensity statin: lipitor 80mg   -3-5 day post stent follow up if discharged before, should follow with HF team: Leah Salvador NP   -[facility] following during hospitalization  -Lifestyle modifications discussed to reduce cardiovascular risk factors including weight reduction, smoking cessation, medication compliance, and routine follow up with Cardiologist to track your BMI, cholesterol, and glucose levels.   - cardiac rehab info provided/referral and communication to cardiac rehab completed   - PLEASE DO NOT ADMINISTER BLOOD TRANSFUSION ON THIS PATIENT WITHIN 72 HOURS OF CARDIAC CATHERIZATION (UNLESS PATIENT IS HEMODYNAMICALLY UNSTABLE OR ACTIVELY BLEEDING) WITHOUT FIRST DISCUSSING WITH INTERVENTIONALIST DR. Bajwa ON TEAMS OR CALLING CATH LAB HOLDING -[phone].  -Management per Hospitalist / ICU  -Discharge in am if overnight tele, EKG, labs in am all remain WNL Now s/p LHC via RFA with Dr. Sánchez, tolerated procedure well. Pt arrived to recovery in NAD and HDS, access site stable, no bleed/hematoma, distal pulse +,   Intraprocedurally: Rotational atherectomy of RCA heavily calcified treated with balloon angioplasty and successful placement of 3 ZEINAB: prox 4.5x18mm, mid 4.0x38mm, distal 3.5x34mm  Medications:  Fentanyl: 25mcg  Versed: 0.5mg  Heparin: 7000 units   Omnipaque: 70ml  Closure device: manual   Post Cath EKG: paced 64bpm    - Pt is already in-patient  -post cardiac cath orders  -groin precautions  -check ACT at 1300, remove sheath if <180, then may raise HOB 30 degrees 1 hour post hemostasis, 45 degrees 2 hours post hemostasis, OOB 4 hours   -EKG post cath  -labs and EKG in am  -NS 0.9% 250ml/hr x 1 bolus: post procedure SALO ppx   -continue current medical therapy  -Dual anti platelet therapy with aspirin/ plavix, strict adherence discussed with patient and son  -beta blocker: metoprolol succ 100mg   -continue Entresto with close monitorin of renal function  -high intensity statin: lipitor 80mg   -3-5 day post stent follow up if discharged before, should follow with HF team: Leah Salvador NP   -[facility] following during hospitalization  -Lifestyle modifications discussed to reduce cardiovascular risk factors including weight reduction, smoking cessation, medication compliance, and routine follow up with Cardiologist to track your BMI, cholesterol, and glucose levels.   - cardiac rehab info provided/referral and communication to cardiac rehab completed   - PLEASE DO NOT ADMINISTER BLOOD TRANSFUSION ON THIS PATIENT WITHIN 72 HOURS OF CARDIAC CATHERIZATION (UNLESS PATIENT IS HEMODYNAMICALLY UNSTABLE OR ACTIVELY BLEEDING) WITHOUT FIRST DISCUSSING WITH INTERVENTIONALIST DR. Bajwa ON TEAMS OR CALLING CATH LAB HOLDING -[phone].  -Management per Hospitalist / clinical cardiology   -Discharge in am if overnight tele, EKG, labs in am all remain WNL Now s/p LHC via RFA with Dr. Sánchez, tolerated procedure well. Pt arrived to recovery in NAD and HDS, access site stable, no bleed/hematoma, distal pulse +,   Intraprocedurally: Rotational atherectomy of RCA heavily calcified treated with balloon angioplasty and successful placement of 3 ZEINAB: prox 4.5x18mm, mid 4.0x38mm, distal 3.5x34mm  Medications:  Fentanyl: 25mcg  Versed: 0.5mg  Heparin: 7000 units   Omnipaque: 70ml  Closure device: manual   Post Cath EKG: paced 64bpm    - Pt is already in-patient  -post cardiac cath orders  -groin precautions  -check ACT at 1300, remove sheath if <180, then may raise HOB 30 degrees 1 hour post hemostasis, 45 degrees 2 hours post hemostasis, OOB 4 hours   -EKG post cath  -labs and EKG in am  -NS 0.9% 250ml/hr x 1 bolus: post procedure SALO ppx   -continue current medical therapy  -Dual anti platelet therapy with aspirin/ plavix, strict adherence discussed with patient and son  -beta blocker: metoprolol succ 100mg   -continue Entresto with close monitorin of renal function  -high intensity statin: lipitor 80mg   -3-5 day post stent follow up if discharged before, should follow with HF team: Leah Salvador NP   -[facility] following during hospitalization  -Lifestyle modifications discussed to reduce cardiovascular risk factors including weight reduction, smoking cessation, medication compliance, and routine follow up with Cardiologist to track your BMI, cholesterol, and glucose levels.   - cardiac rehab info provided/referral and communication to cardiac rehab completed   - PLEASE DO NOT ADMINISTER BLOOD TRANSFUSION ON THIS PATIENT WITHIN 72 HOURS OF CARDIAC CATHERIZATION (UNLESS PATIENT IS HEMODYNAMICALLY UNSTABLE OR ACTIVELY BLEEDING) WITHOUT FIRST DISCUSSING WITH INTERVENTIONALIST DR. Bajwa ON TEAMS OR CALLING CATH LAB HOLDING -[phone].  -Management per Hospitalist / clinical cardiology

## 2025-01-13 NOTE — DISCHARGE NOTE PROVIDER - PROVIDER TOKENS
PROVIDER:[TOKEN:[[id number]],SCHEDULEDAPPT:[01/16/2025],SCHEDULEDAPPTTIME:[11:00 AM]],FREE:[LAST:[Collin],FIRST:[Dejan],PHONE:[phone]],FAX:[(   )    -],ADDRESS:[[address] [place] [address]],SCHEDULEDAPPT:[01/21/2025],SCHEDULEDAPPTTIME:[09:00 PM]] PROVIDER:[TOKEN:[[id number]],SCHEDULEDAPPT:[01/16/2025],SCHEDULEDAPPTTIME:[11:00 AM]],PROVIDER:[TOKEN:[[id number]],FOLLOWUP:[1 week]],PROVIDER:[TOKEN:[[id number]],FOLLOWUP:[1 week]],FREE:[LAST:[Polanco],FIRST:[Dejan],PHONE:[phone]:[(   )    -],ADDRESS:[[address] [place] [address]],SCHEDULEDAPPT:[01/21/2025],SCHEDULEDAPPTTIME:[09:00 PM]],PROVIDER:[TOKEN:[[id number]],FOLLOWUP:[1 week],ESTABLISHEDPATIENT:[T]]

## 2025-01-13 NOTE — PROGRESS NOTE ADULT - ASSESSMENT
73 y/o female with PMH of HTN, HLD, CAD s/p PCI, HFrEF, Mobitz II s/p PPM (MD NereydaT), PAD with chronic right LE wound, CVA, DM2, CKD3 came to the ED complaining of shortness of breath and fatigue x 4 days. Patient endorsed orthopnea, burning in her chest.     Acute on Chronic Systolic Heart Failure   ECHO with new decrease in EF  s/p cath today with balloon angioplasty and 3 ZEINAB to RCA  Strict intake / output and daily weight   IV Lasix was changed to PO 40mg BID on 1/11   Continue Metoprolol / Imdur / Entresto  Cardiology recs appreciated  Outpatient follow up with EP for CRT upgrade if no improvement in EF after angioplasty and 3 ZEINAB today   Saturating well on room air    CAD / HLD / HTN  Aspirin 81mg   Plavix 75mg   Atorvastatin 80mg   Ranolazine 500mg bid   Imdur 60mg   Metoprolol ER 100mg   Cath on Monday   Cardio following     DM-2   A1c 7.1 on 10/9  Hold Metformin 1000mg bid   Lantus 26 units HS  FS with ISS    CKD-3 / Metabolic Acidosis   Stable   Monitor renal function   Continue Sodium Bicarb     Anemia   Likely of chronic disease   Hb stable   Iron tab (as per granddaughter, she now only takes it daily instead of tid)   Monitor CBC    Right Foot Wound  Podiatry recs appreciated     Hypocalcemia  Replete     DVT ppx - Heparin SQ    Dispo: Home likely tomorrow.    Updated patient's son and granddaughter at bedside.

## 2025-01-13 NOTE — PROGRESS NOTE ADULT - SUBJECTIVE AND OBJECTIVE BOX
[facility]                                                         CARDIOLOGY AT [place] [address]                                                         Telephone: [phone]. Fax:[phone]                                                                             PROGRESS NOTE    Reason for follow up: AoC HFrEF  Update: s/p [facility] with ZEINAB      Review of symptoms:   Cardiac:  No chest pain. No dyspnea. No palpitations.  Respiratory: no cough. No dyspnea  Gastrointestinal: No diarrhea. No abdominal pain. No bleeding.   Neuro: No focal neuro complaints.    Vitals:  T(C): 36.4 (01-13-25 @ 07:19), Max: 36.9 (01-12-25 @ 23:55)  HR: 77 (01-13-25 @ 14:00) (62 - 81)  BP: 108/79 (01-13-25 @ 13:00) (89/66 - 142/65)  RR: 17 (01-13-25 @ 14:00) (16 - 18)  SpO2: 97% (01-13-25 @ 14:00) (95% - 98%)  Wt(kg): --  I&O's Summary    12 Jan 2025 07:01  -  13 Jan 2025 07:00  --------------------------------------------------------  IN: 964 mL / OUT: 1800 mL / NET: -836 mL          PHYSICAL EXAM:  Appearance: Comfortable. No acute distress  HEENT:  Atraumatic. Normocephalic.  Normal oral mucosa  Neurologic: A & O x 3, no gross focal deficits.  Cardiovascular: RRR S1 S2, No murmur, no rubs/gallops. No JVD  Respiratory: Lungs clear to auscultation, unlabored   Gastrointestinal:  Soft, Non-tender, + BS  Lower Extremities: 2+ Peripheral Pulses, No clubbing, cyanosis, or edema  Psychiatry: Patient is calm. No agitation.   Skin: warm and dry.    CURRENT CARDIAC MEDICATIONS:  furosemide    Tablet 40 milliGRAM(s) Oral two times a day  isosorbide   mononitrate ER Tablet (IMDUR) 60 milliGRAM(s) Oral daily  metoprolol succinate  milliGRAM(s) Oral daily  ranolazine 500 milliGRAM(s) Oral two times a day  sacubitril 49 mG/valsartan 51 mG 1 Tablet(s) Oral two times a day      CURRENT OTHER MEDICATIONS:  acetaminophen     Tablet .. 650 milliGRAM(s) Oral every 6 hours PRN Temp greater or equal to 38C (100.4F), Mild Pain (1 - 3)  gabapentin 300 milliGRAM(s) Oral at bedtime  melatonin 3 milliGRAM(s) Oral at bedtime PRN Insomnia  ondansetron Injectable 4 milliGRAM(s) IV Push every 8 hours PRN Nausea and/or Vomiting  aluminum hydroxide/magnesium hydroxide/simethicone Suspension 30 milliLiter(s) Oral every 4 hours PRN Dyspepsia  atorvastatin 80 milliGRAM(s) Oral at bedtime  dextrose 50% Injectable 25 Gram(s) IV Push once, Stop order after: 1 Doses  dextrose 50% Injectable 12.5 Gram(s) IV Push once, Stop order after: 1 Doses  dextrose 50% Injectable 25 Gram(s) IV Push once, Stop order after: 1 Doses  dextrose Oral Gel 15 Gram(s) Oral once, Stop order after: 1 Doses PRN Blood Glucose LESS THAN 70 milliGRAM(s)/deciliter  glucagon  Injectable 1 milliGRAM(s) IntraMuscular once, Stop order after: 1 Doses  insulin glargine Injectable (LANTUS) 26 Unit(s) SubCutaneous at bedtime  insulin lispro (ADMELOG) corrective regimen sliding scale   SubCutaneous three times a day before meals  insulin lispro (ADMELOG) corrective regimen sliding scale   SubCutaneous at bedtime  aspirin enteric coated 81 milliGRAM(s) Oral daily  calcium gluconate IVPB 2 Gram(s) IV Intermittent every 6 hours, Stop order after: 2 Doses  chlorhexidine 2% Cloths 1 Application(s) Topical <User Schedule>  clopidogrel Tablet 75 milliGRAM(s) Oral daily  dextrose 5%. 1000 milliLiter(s) (50 mL/Hr) IV Continuous <Continuous>  dextrose 5%. 1000 milliLiter(s) (100 mL/Hr) IV Continuous <Continuous>  ferrous    sulfate 325 milliGRAM(s) Oral daily  heparin   Injectable 5000 Unit(s) SubCutaneous every 8 hours  sodium bicarbonate 650 milliGRAM(s) Oral two times a day  sodium chloride 0.9% Bolus 250 milliLiter(s) IV Bolus once, Stop order after: 1 Doses      LABS:	 	        01-13    138  |  106  |  34.1[H]  ----------------------------<  244[H]  4.6   |  17.0[L]  |  1.71[H]    Ca    7.2[L]      13 Jan 2025 04:16  Mg     2.3     01-13        Lipid Profile:   HgA1c:   TSH:     TELEMETRY: paced       DIAGNOSTIC TESTING:  [ ] Echocardiogram:   < from: TTE W or WO Ultrasound Enhancing Agent (12.05.24 @ 21:37) >  CONCLUSIONS:      1. Technically difficult image quality.   2. Left ventricular cavity is normal in size. Left ventricular systolic function is moderately decreased with an ejection fraction of 39 % by Taylor's method of disks with an ejection fraction visually estimated at 35 to 40 %.   3. Multiple segmental abnormalities exist. See findings.   4. Unable to assess left ventricular diastolic function due to insufficient data.   5. Normal right ventricular cavity size and normal right ventricular systolic function.   6. Left atrium is normal in size.   7. Mild to moderate mitral regurgitation.   8. Mild tricuspid regurgitation.   9. Estimated pulmonary artery systolic pressure is 38 mmHg, consistent with mild pulmonary hypertension.  10. Trileaflet aortic valve. Fibrocalcific aortic valve sclerosis without stenosis.  11. Trace aortic regurgitation.  12. No pericardial effusion seen.  13. Compared to the transthoracic echocardiogram performed on 10/8/2024, there is further decline in left ventricular function, from 45-50% to 35-40%.    < end of copied text >    [ ]  Catheterization:      < from: Cardiac Catheterization (01.13.25 @ 09:55) >  Diagnostic Findings:     Coronary Angiography   The coronary circulation is right dominant.      RCA   Right coronary artery: Angiography shows severe atherosclerosis. Prox  70%, mid 80%, distal 90% calcified stenosis.        Interventional Details   Proximal right coronary artery: This was a 70 % calcified stenosis.  The lesion length was 10 mm. This was an ACC/AHA High/C  lesion for intervention. This is the culprit lesion. Guidewire  crossing was successful.    < end of copied text >

## 2025-01-13 NOTE — PROGRESS NOTE ADULT - SUBJECTIVE AND OBJECTIVE BOX
Patient was seen bedside resting comfortably. No acute overnight events noted. Patient denies any malodor or drainage coming from his dressings. Patient denies any numbness, tingling, or burning. Patient denies any other pedal complaints or calf tenderness bilaterally. SHe has been compliant with keeping their dressings clean, dry, and intact. Patient denies any other pedal complaints and no constitutional symptoms such as F/C/N/V/SOB. AAOx3, NAD.    HPI:  Patient seen and examined before midnight.     73 y/o female with PMH of HTN, HLD, CAD s/p PCI, HFrEF, Mobitz II s/p PPM (MD NereydaT), PAD with chronic right LE wound, CVA, DM2, CKD3 came to the ED complaining of shortness of breath and fatigue x 4 days. Patient said "my body is full of water". As per son (Balaji) and granddaughter (Lexi) via phone, patient was discharged about 2 weeks ago and was told to use Lasix PRN. They reported patient gained 11lb in 4 days, cardiology called saying to give her Lasix 80mg which was given in the last 2 days. Patient endorsed orthopnea, burning in her chest. She has no fever, chills, nausea, vomiting, palpitation, abdominal pain, change in bowel/urinary habit, fall, HA, dizziness.      (07 Jan 2025 21:32)      Podiatry HPI: Patient was last in hospital a month ago when an oxygen tank fell on her Right foot. Podiatry was following then and patient had a contusion on right foot. She saw Dr. Ramirez in clinic 2 days ago when he performed an drainage of the hematoma that was present on dorsal foot.     Medications acetaminophen     Tablet .. 650 milliGRAM(s) Oral every 6 hours PRN  aluminum hydroxide/magnesium hydroxide/simethicone Suspension 30 milliLiter(s) Oral every 4 hours PRN  aspirin enteric coated 81 milliGRAM(s) Oral daily  atorvastatin 80 milliGRAM(s) Oral at bedtime  calcium gluconate IVPB 2 Gram(s) IV Intermittent every 6 hours  chlorhexidine 2% Cloths 1 Application(s) Topical <User Schedule>  clopidogrel Tablet 75 milliGRAM(s) Oral daily  dextrose 5%. 1000 milliLiter(s) IV Continuous <Continuous>  dextrose 5%. 1000 milliLiter(s) IV Continuous <Continuous>  dextrose 50% Injectable 25 Gram(s) IV Push once  dextrose 50% Injectable 12.5 Gram(s) IV Push once  dextrose 50% Injectable 25 Gram(s) IV Push once  dextrose Oral Gel 15 Gram(s) Oral once PRN  ferrous    sulfate 325 milliGRAM(s) Oral daily  furosemide    Tablet 40 milliGRAM(s) Oral two times a day  gabapentin 300 milliGRAM(s) Oral at bedtime  glucagon  Injectable 1 milliGRAM(s) IntraMuscular once  heparin   Injectable 5000 Unit(s) SubCutaneous every 8 hours  insulin glargine Injectable (LANTUS) 26 Unit(s) SubCutaneous at bedtime  insulin lispro (ADMELOG) corrective regimen sliding scale   SubCutaneous three times a day before meals  insulin lispro (ADMELOG) corrective regimen sliding scale   SubCutaneous at bedtime  isosorbide   mononitrate ER Tablet (IMDUR) 60 milliGRAM(s) Oral daily  melatonin 3 milliGRAM(s) Oral at bedtime PRN  metoprolol succinate  milliGRAM(s) Oral daily  ondansetron Injectable 4 milliGRAM(s) IV Push every 8 hours PRN  ranolazine 500 milliGRAM(s) Oral two times a day  sacubitril 49 mG/valsartan 51 mG 1 Tablet(s) Oral two times a day  sodium bicarbonate 650 milliGRAM(s) Oral two times a day    FHFamily history of diabetes mellitus    No pertinent family history in first degree relatives    FH: asthma    ,   PMHDM (diabetes mellitus)    HTN (hypertension)    Acute otitis media, unspecified otitis media type    H/O gastroesophageal reflux (GERD)    Cardiac pacemaker    CVA (cerebrovascular accident)    CVA (cerebrovascular accident)    PAD (peripheral artery disease)    Wound of right leg       PSHHistory of benign brain tumor    Cataract    History of biopsy    Cardiac pacemaker    S/P angiogram of extremity        Labs       01-13    138  |  106  |  34.1[H]  ----------------------------<  244[H]  4.6   |  17.0[L]  |  1.71[H]    Ca    7.2[L]      13 Jan 2025 04:16  Mg     2.3     01-13       Vital Signs Last 24 Hrs  T(C): 36.4 (13 Jan 2025 15:43), Max: 36.9 (12 Jan 2025 23:55)  T(F): 97.6 (13 Jan 2025 15:43), Max: 98.4 (12 Jan 2025 23:55)  HR: 76 (13 Jan 2025 15:43) (62 - 81)  BP: 156/80 (13 Jan 2025 15:43) (89/66 - 156/80)  BP(mean): 106 (13 Jan 2025 15:43) (106 - 106)  RR: 19 (13 Jan 2025 15:43) (16 - 19)  SpO2: 97% (13 Jan 2025 15:43) (95% - 98%)    Parameters below as of 13 Jan 2025 15:43  Patient On (Oxygen Delivery Method): room air      Sedimentation Rate, Erythrocyte: 63 mm/hr (10-10-24 @ 05:16)  Sedimentation Rate, Erythrocyte: 72 mm/hr (10-09-24 @ 06:00)         C-Reactive Protein: 26 mg/L (10-09-24 @ 04:43)     LE Focused Exam  Vascular: DP PT faintly palpable b/l. CFT <3 secs x 10. Temp Gradient wnl. NO edema, NO erythema, NO varicosities  Dermatological: Right foot wound dorsal to 4th MPJ area about 1.1 cm wide which has now become an eschar. A small dry eschar present 1 cm lateral to it. NO clinical signs of infection. -malodor, -ptb, -soft tissue crepitus - fluctuance -soft tissue crepitus  Neurological: Patient is awake, alert and oriented x3.   MSK: Deferred at this time.

## 2025-01-13 NOTE — CONSULT NOTE ADULT - NS ATTEND AMEND GEN_ALL_CORE FT
I have seen and examined the patient and agree with the assessment and plan as documented. Here for PCI of RCA.

## 2025-01-13 NOTE — CONSULT NOTE ADULT - ASSESSMENT
Risk Assessments:  ASA: 3  Mallampati: 3  BRA: 5.8%  Creat: 1.71  GFR: 31    Indication: staged PCI       Plan:   -[facility]   -Preferred access: RFA   -EKG and labs reviewed  -Aspirin 81mg po pre procedure/plavix 300mg   -250 0.9% NS bolus pre procedure: SALO ppx   -risks and benefits discussed with patient, consent obtained 
74 F with PMHX CAD s/p PCI, HFrEF, Mobitz II s/p PPM (Nereyda, MDT), PAD with chronic right LE wound, CVA, DM2, CKD3, HTN presents to [facility] ER c/o shortness of breath/RAMIREZ and worsening LE edema.    #Acute on Chronic Heart Failure  - CardioMEMS interrogated and showing PAP 21-24mmHg (bad waveform)  - Patient saturating well on room air  - Chest x-ray reviewed and not grossly fluid overloaded (compared to previous admission)  - No pitting edema observed on physical exam  - F/u CBC, CMP, BNP  - Will follow along    Case discussed with the attending physician Dr. Givens

## 2025-01-13 NOTE — DISCHARGE NOTE PROVIDER - NSDCCPTREATMENT_GEN_ALL_CORE_FT
PRINCIPAL PROCEDURE  Procedure: Left heart cardiac cath  Findings and Treatment: No heavy lifting, driving, sex, tub baths, swimming, or any activity that submerges the lower half of the body in water for 48 hours.  Limited walking and stairs for 48 hours.    Change the bandaid after 24 hours and every 24 hours after that.  Keep the puncture site dry and covered with a bandaid until a scab forms.    Observe the site frequently.  If bleeding or a large lump (the size of a golf ball or bigger) occurs lie flat, apply continuous direct pressure just above the puncture site for at least 10 minutes, and notify your physician immediately.  If the bleeding cannot be controlled, call 911 immediately for assistance.  Notify your physician of pain, swelling or any drainage.    Notify your physician immediately if coldness, numbness, discoloration or pain in your foot occurs.

## 2025-01-13 NOTE — DISCHARGE NOTE PROVIDER - HOSPITAL COURSE
73 y/o female with PMH of HTN, HLD, CAD s/p PCI, HFrEF, Mobitz II s/p PPM (MD NereydaT), PAD with chronic right LE wound, CVA, DM2, CKD3 came to the ED complaining of shortness of breath and fatigue x 4 days. Patient endorsed orthopnea, burning in her chest.   Admitted with Acute on Chronic Systolic Heart Failure. Started on IV Lasix. Entresto was added. ECHO with new decrease in EF. Cardio recommended cardiac cath which was done on 1/13/25: balloon angioplasty with 3 ZEINAB to RCA. She was monitored closely. She is feeling much better and is stable for discharge with close outpatient follow ups.      Acute on Chronic Systolic Heart Failure   CAD / HLD / HTN  DM-2   CKD-3 / Metabolic Acidosis   Anemia   Right Foot Wound  Hypocalcemia

## 2025-01-13 NOTE — PROGRESS NOTE ADULT - ASSESSMENT
A: Right foot dorsal wound from trauma      P:   Patient evaluated and Chart reviewed   Discussed diagnosis and treatment with patient  Applied Silver alginate with dry sterile dressing  WBAT  Offloading to bilateral Heels.   Stable from podiatry, will follow for wound monitoring  Podiatry to follow while in house.  Patient to follow up with Dr. Ramirez in office [address] [place] [address] office, call [phone] to make an appointment  Discussed with Attending Ashley

## 2025-01-13 NOTE — DISCHARGE NOTE PROVIDER - NSDCFUSCHEDAPPT_GEN_ALL_CORE_FT
Yoel Ramirez  [facility]  PODIATRY [address]  Scheduled Appointment: 01/14/2025    CodyciFransisco rivera  [facility]  CARDIOLOGY [address]  Scheduled Appointment: 01/27/2025    Dylan Canchola  [facility]  NEPHRO [address]  Scheduled Appointment: 02/05/2025     Fransisco Armendariz  [facility]  CARDIOLOGY [address]  Scheduled Appointment: 01/27/2025    Dylan Canchola  [facility]  NEPHRO [address]  Scheduled Appointment: 02/05/2025

## 2025-01-13 NOTE — PROGRESS NOTE ADULT - PROBLEM SELECTOR PLAN 2
-Known critical stenosis on C from 11/2024. RCA stent 99% and 95% OM1 with 95% [id number] LAD. No intervention at that time due to lack of symptoms   -Noted elevated troponin, flat in nature  -Had some reproducible CP this AM  -C/w BB, DAPT, statin, imdur     LHC as above. RCA x3 ZEINAB with rota  CRT implant as per EP -Known critical stenosis on C from 11/2024. RCA stent 99% and 95% OM1 with 95% [id number] LAD. No intervention at that time due to lack of symptoms   -Noted elevated troponin, flat in nature  -Had some reproducible CP this AM  -C/w BB, DAPT, statin, imdur     LHC as above. RCA x3 ZEINAB with rota  CRT implant as per EP -> discussed with EP, outpatient follow up

## 2025-01-13 NOTE — DISCHARGE NOTE PROVIDER - NSDCCPCAREPLAN_GEN_ALL_CORE_FT
PRINCIPAL DISCHARGE DIAGNOSIS  Diagnosis: CHF exacerbation  Assessment and Plan of Treatment:       SECONDARY DISCHARGE DIAGNOSES  Diagnosis: CAD (coronary artery disease)  Assessment and Plan of Treatment: You had a cardiac catheterization which rotational atherectomy of RCA heavily calcified treated with balloon angioplasty and successful placement of 3 ZEINAB: prox 4.5x18mm, mid 4.0x38mm, distal 3.5x34mm  It is IMPERATIVE to continue Dual Antiplatelet therapy with Asprin 81mg and Plavix 75mg without interrruption to prevent clot formation inside stent.   -Please continue high intensity statin   -Please continue BB  -Please continue ARB  - cardiac rehab info provided/referral and communication to cardiac rehab completed   -follow up with cardiologist     PRINCIPAL DISCHARGE DIAGNOSIS  Diagnosis: CHF exacerbation  Assessment and Plan of Treatment:       SECONDARY DISCHARGE DIAGNOSES  Diagnosis: CAD (coronary artery disease)  Assessment and Plan of Treatment: You had a cardiac catheterization which rotational atherectomy of RCA heavily calcified treated with balloon angioplasty and successful placement of 3 ZEINAB: prox 4.5x18mm, mid 4.0x38mm, distal 3.5x34mm  It is IMPERATIVE to continue Dual Antiplatelet therapy with Asprin 81mg and Plavix 75mg without interrruption to prevent clot formation inside stent.   -Please continue high intensity statin: lipitor   -Please continue BB  -Please continue Entresto   - cardiac rehab info provided/referral and communication to cardiac rehab completed   -follow up for site check on 1/16/2025 11am [place]  -new consult for Heart Failure with Dr. Polanco 1/21/2025     PRINCIPAL DISCHARGE DIAGNOSIS  Diagnosis: Acute on chronic systolic congestive heart failure  Assessment and Plan of Treatment: Continue medications as prescribed.  Follow up with Cardio as scheduled.      SECONDARY DISCHARGE DIAGNOSES  Diagnosis: CAD (coronary artery disease)  Assessment and Plan of Treatment: You had a cardiac catheterization which rotational atherectomy of RCA heavily calcified treated with balloon angioplasty and successful placement of 3 ZEINAB: prox 4.5x18mm, mid 4.0x38mm, distal 3.5x34mm  It is IMPERATIVE to continue Dual Antiplatelet therapy with Asprin 81mg and Plavix 75mg without interrruption to prevent clot formation inside stent.   -Please continue high intensity statin: lipitor   -Please continue BB  -Please continue Entresto   - cardiac rehab info provided/referral and communication to cardiac rehab completed   [facility]  -new consult for Heart Failure with Dr. Polanco 1/21/2025

## 2025-01-13 NOTE — PROGRESS NOTE ADULT - NS ATTEND AMEND GEN_ALL_CORE FT
Patient seen and examined by me.    I have discussed my recommendation with the PA which are outlined above.  Will follow.

## 2025-01-13 NOTE — DISCHARGE NOTE PROVIDER - CARE PROVIDERS DIRECT ADDRESSES
,[email or web address],DirectAddress_Unknown ,[email or web address],DirectAddress_Unknown,DirectAddress_Unknown

## 2025-01-13 NOTE — DISCHARGE NOTE PROVIDER - CARE PROVIDER_API CALL
Becky Pan  Cardiology  [address] [place] [address]  Phone: [phone]  Fax: [phone]  Scheduled Appointment: 01/16/2025 11:00 AM    Dejan Polanco  [address] [place] [address]  Phone: [phone]  Fax: (   )    -  Scheduled Appointment: 01/21/2025 09:00 PM   Becky Pan  Cardiology  [address] [place] [address]  Phone: [phone]  Fax: [phone]  Scheduled Appointment: 01/16/2025 11:00 AM    Leah Salvador  NP in [address] [address]  Phone: [phone]  Fax: [phone]  Follow Up Time: 1 week    Yoel Ramirez  Podiatric Medicine and Surgery  [address] [place] [address]  Phone: [phone]  Fax: [phone]  Follow Up Time: 1 week    Dejan Polanco  [address] [place] [address]  Phone: [phone]  Fax: (   )    -  Scheduled Appointment: 01/21/2025 09:00 PM    JUNO CRONIN Patient  Follow Up Time: 1 week

## 2025-01-13 NOTE — DISCHARGE NOTE PROVIDER - NSDCHOSPICE_GEN_A_CORE
[facility] -   Hematology/Oncology  Progress Note    Patient Name: Nico Garza Jr.  Admission Date: 10/25/2018  Hospital Length of Stay: 3 days  Code Status: Full Code     Subjective:     HPI:  Nico Garza is a 73 yo male with metastatic lung cancer with mets to spine, PAF, COPD, HTN, HPL and IDDM who was at the clinic and developed some lightheadedness and bright light visual disturbance and had to sit in a chair to prevent from falling.  Associated symptoms lumbar back pain, BENITO, dry cough, chest pain, abdominal pain, suprapubic pain & urinary urgency. In the ED, temp 98.6, pulse 86, Resp 25, B/P 86/47 and ABG shows hypoxic respiratory failure. Fluid resuscitation given and B/P improved 116/55. Pt presently wearing Vapotherm. CBC shows a normocytic anemia, glucose 45 (improved after eating), troponin normal, lactic acid 1.2 and procal 0.45. U/A positive nitrites and 60 WBC. Blood and urine culture pending. Pt is admitted for treatment of intravascular volume depletion, UTI and hypoxic respiratory failure.     Patient has metastatic lung cancer with mets to liver.  He received 1st dose of Keytruda on October 18, 2018 and is also getting radiation to his spine.  His primary oncologist is Dr. Issa Lagos.  Hematology/oncology consulted for further management of care.        Interval History:   October 27, 2018-the patient reports that he continues to have shortness of breath and is requiring high-dose supplemental oxygen support.  He reports generalized weakness and fatigue.    October 28, 2018-the patient reports that he continues to have shortness of breath and is requiring high-dose supplemental oxygen support.  He reports generalized weakness and fatigue.  He has been refusing his prednisone yesterday and today.    Oncology Treatment Plan:   OP PEMBROLIZUMAB 200MG Q3W    Medications:  Continuous Infusions:   sodium chloride 0.9% 75 mL/hr at 10/28/18 0543     Scheduled Meds:    albuterol-ipratropium  3 mL Nebulization Q6H    apixaban  5 mg Oral BID    budesonide  0.5 mg Nebulization BID    cefTRIAXone (ROCEPHIN) IVPB  1 g Intravenous Q24H    diltiaZEM  360 mg Oral Daily    flecainide  100 mg Oral Q12H    gabapentin  600 mg Oral QHS    insulin detemir U-100  10 Units Subcutaneous BID    pantoprazole  40 mg Oral Daily    polyethylene glycol  17 g Oral Daily    pravastatin  40 mg Oral Nightly    predniSONE  20 mg Oral Daily    sertraline  50 mg Oral Daily     PRN Meds:acetaminophen, acetaminophen, dextrose 50%, dextrose 50%, glucagon (human recombinant), glucose, glucose, insulin aspart U-100, morphine, ondansetron, oxyCODONE, promethazine (PHENERGAN) IVPB     Review of Systems   Constitutional: Positive for appetite change. Negative for chills, diaphoresis, fatigue and fever.   HENT: Negative.    Eyes: Positive for visual disturbance.   Respiratory: Positive for cough and shortness of breath. Negative for wheezing.    Cardiovascular: Positive for chest pain and palpitations. Negative for leg swelling.   Gastrointestinal: Positive for abdominal pain and constipation. Negative for diarrhea, nausea and vomiting.   Genitourinary: Positive for urgency. Negative for dysuria and hematuria.   Musculoskeletal: Positive for back pain.   Skin: Negative for pallor, rash and wound.   Neurological: Positive for light-headedness. Negative for seizures, facial asymmetry, speech difficulty, weakness and headaches.   Psychiatric/Behavioral: Negative for confusion. The patient is not nervous/anxious.      Objective:     Vital Signs (Most Recent):  Temp: 97.4 °F (36.3 °C) (10/28/18 1119)  Pulse: 85 (10/28/18 1119)  Resp: 18 (10/28/18 1119)  BP: (!) 110/56 (10/28/18 1119)  SpO2: (!) 94 % (10/28/18 1145) Vital Signs (24h Range):  Temp:  [97.1 °F (36.2 °C)-98.7 °F (37.1 °C)] 97.4 °F (36.3 °C)  Pulse:  [73-94] 85  Resp:  [18-22] 18  SpO2:  [91 %-96 %] 94 %  BP: (106-143)/(52-61) 110/56     Weight: 82.4  kg (181 lb 10.5 oz)  Body mass index is 28.45 kg/m².  Body surface area is 1.97 meters squared.      Intake/Output Summary (Last 24 hours) at 10/28/2018 1156  Last data filed at 10/28/2018 0600  Gross per 24 hour   Intake 1940 ml   Output 600 ml   Net 1340 ml       Physical Exam   Constitutional: He is oriented to person, place, and time. He appears well-developed.  Non-toxic appearance. He has a sickly appearance. He appears ill. No distress.   HENT:   Head: Normocephalic and atraumatic.   Nose: Nose normal.   Mouth/Throat: Oropharynx is clear and moist.   Eyes: Conjunctivae, EOM and lids are normal. Pupils are equal, round, and reactive to light. No scleral icterus.   Neck: Normal range of motion. Neck supple.   Cardiovascular: Normal rate, regular rhythm, S1 normal and S2 normal. Exam reveals no gallop and no friction rub.   Murmur heard.   Systolic murmur is present with a grade of 2/6.  Pulmonary/Chest: Effort normal. No accessory muscle usage or stridor. No apnea, no tachypnea and no bradypnea. No respiratory distress. He has decreased breath sounds in the right lower field, the left upper field, the left middle field and the left lower field. He has no wheezes. He has no rales.   Abdominal: Soft. Bowel sounds are normal.   Musculoskeletal: Normal range of motion. He exhibits no edema or tenderness.   Neurological: He is alert and oriented to person, place, and time.   Skin: Skin is warm, dry and intact. No ecchymosis noted. He is not diaphoretic. There is pallor.   Psychiatric: His speech is normal and behavior is normal. Judgment and thought content normal. His mood appears anxious. Cognition and memory are normal. He is attentive.   Nursing note and vitals reviewed.      Significant Labs:   I have reviewed all of the patient's relevant lab work available in the medical record and have utilized this in my evaluation and management recommendations today    Diagnostic Results:  I have reviewed all of the  patient's diagnostic/imaging results available in the medical record and have utilized this in my evaluation and management recommendations today.    Assessment/Plan:     * Acute on chronic respiratory failure    October 27, 2018-I have recommended proceeding with PE protocol CT of the thorax to evaluate for any evidence of pulmonary embolism as the etiology of his acute on chronic respiratory failure.  I have discussed my recommendations with Hospital Medicine today.    October 28, 2018-results of PE protocol CT of the thorax were reviewed in detail with the patient and his daughter.  Results also discussed in detail with Hospital Medicine.  It is too early to determine response to therapy as he has just gotten 1 dose of keytruda so far.  I have recommended that he start corticosteroid therapy in patient as recommended by Hospital Medicine.  Patient has been hesitant to take this because of him being on CT today.  However I have explained to the patient that at this time the benefit of taking corticosteroid therapy to help with his COPD exacerbation outweighs the risk from this.  He expressed understanding and is agreeable to take this.     UTI (urinary tract infection)    10/26/18 - urinalysis positive for UTI  -continue broad-spectrum antibiotics; switch to sensitive antibiotic once culture resulted  -monitor for fever  -CBC daily     Mass of lower lobe of left lung    10/26/18 patient has Stage IV metastatic non-small cell lung cancer/squamous cell carcinoma with high PDL1  Lung mass/mediastinal lymphadenopathy, left adrenal nodule and lytic lesion of L1.  He is receiving Keytruda and radiation to the spine for treatment.  First dose of Keytruda was on October 18, 2018.  He also has begun radiation treatments to the spine.  He is scheduled for radiation today.  He will follow up with his primary oncologist, Dr. Woodall, after acute illness resolved.  Continue supportive care including:  -Nebulizer treatments  every 6 hr  -oxygen supplementation as needed    October 27, 2018-I had a detailed discussion with the patient today with regard to his current clinical situation.  He will resume treatment with keytruda as an outpatient.  He will continue palliative XRT to the spine.    October 28, 2018-I had a detailed discussion with the patient and his daughter today who was at the bedside with regard to his current clinical situation.  He will resume treatment with keytruda as an outpatient.  He will continue palliative XRT to the spine.             Thank you for your consult.      Deric Sow MD  Hematology/Oncology  [facility] -      No

## 2025-01-14 ENCOUNTER — APPOINTMENT (OUTPATIENT)
Age: 75
End: 2025-01-14

## 2025-01-14 ENCOUNTER — TRANSCRIPTION ENCOUNTER (OUTPATIENT)
Age: 75
End: 2025-01-14

## 2025-01-14 VITALS
DIASTOLIC BLOOD PRESSURE: 68 MMHG | RESPIRATION RATE: 18 BRPM | SYSTOLIC BLOOD PRESSURE: 132 MMHG | HEART RATE: 70 BPM | TEMPERATURE: 98 F | OXYGEN SATURATION: 96 %

## 2025-01-14 LAB
ANION GAP SERPL CALC-SCNC: 14 MMOL/L — SIGNIFICANT CHANGE UP (ref 5–17)
BUN SERPL-MCNC: 29.8 MG/DL — HIGH (ref 8–20)
CALCIUM SERPL-MCNC: 8.5 MG/DL — SIGNIFICANT CHANGE UP (ref 8.4–10.5)
CHLORIDE SERPL-SCNC: 106 MMOL/L — SIGNIFICANT CHANGE UP (ref 96–108)
CO2 SERPL-SCNC: 18 MMOL/L — LOW (ref 22–29)
CREAT SERPL-MCNC: 1.44 MG/DL — HIGH (ref 0.5–1.3)
EGFR: 39 ML/MIN/1.73M2 — LOW
GLUCOSE BLDC GLUCOMTR-MCNC: 178 MG/DL — HIGH (ref 70–99)
GLUCOSE SERPL-MCNC: 143 MG/DL — HIGH (ref 70–99)
MAGNESIUM SERPL-MCNC: 2.4 MG/DL — SIGNIFICANT CHANGE UP (ref 1.6–2.6)
POTASSIUM SERPL-MCNC: 4.8 MMOL/L — SIGNIFICANT CHANGE UP (ref 3.5–5.3)
POTASSIUM SERPL-SCNC: 4.8 MMOL/L — SIGNIFICANT CHANGE UP (ref 3.5–5.3)
SODIUM SERPL-SCNC: 138 MMOL/L — SIGNIFICANT CHANGE UP (ref 135–145)

## 2025-01-14 PROCEDURE — 96374 THER/PROPH/DIAG INJ IV PUSH: CPT

## 2025-01-14 PROCEDURE — 0225U NFCT DS DNA&RNA 21 SARSCOV2: CPT

## 2025-01-14 PROCEDURE — C1724: CPT

## 2025-01-14 PROCEDURE — 85027 COMPLETE CBC AUTOMATED: CPT

## 2025-01-14 PROCEDURE — 85025 COMPLETE CBC W/AUTO DIFF WBC: CPT

## 2025-01-14 PROCEDURE — 80048 BASIC METABOLIC PNL TOTAL CA: CPT

## 2025-01-14 PROCEDURE — 36415 COLL VENOUS BLD VENIPUNCTURE: CPT

## 2025-01-14 PROCEDURE — 99285 EMERGENCY DEPT VISIT HI MDM: CPT

## 2025-01-14 PROCEDURE — 87641 MR-STAPH DNA AMP PROBE: CPT

## 2025-01-14 PROCEDURE — 92978 ENDOLUMINL IVUS OCT C 1ST: CPT | Mod: RC

## 2025-01-14 PROCEDURE — 93005 ELECTROCARDIOGRAM TRACING: CPT

## 2025-01-14 PROCEDURE — 99239 HOSP IP/OBS DSCHRG MGMT >30: CPT

## 2025-01-14 PROCEDURE — 87640 STAPH A DNA AMP PROBE: CPT

## 2025-01-14 PROCEDURE — C1725: CPT

## 2025-01-14 PROCEDURE — 83880 ASSAY OF NATRIURETIC PEPTIDE: CPT

## 2025-01-14 PROCEDURE — C1769: CPT

## 2025-01-14 PROCEDURE — 83735 ASSAY OF MAGNESIUM: CPT

## 2025-01-14 PROCEDURE — C1887: CPT

## 2025-01-14 PROCEDURE — C1753: CPT

## 2025-01-14 PROCEDURE — 84484 ASSAY OF TROPONIN QUANT: CPT

## 2025-01-14 PROCEDURE — C1894: CPT

## 2025-01-14 PROCEDURE — 82962 GLUCOSE BLOOD TEST: CPT

## 2025-01-14 PROCEDURE — C9602: CPT | Mod: RC

## 2025-01-14 PROCEDURE — 80053 COMPREHEN METABOLIC PANEL: CPT

## 2025-01-14 PROCEDURE — C1874: CPT

## 2025-01-14 PROCEDURE — 71045 X-RAY EXAM CHEST 1 VIEW: CPT

## 2025-01-14 PROCEDURE — T1013: CPT

## 2025-01-14 RX ORDER — FUROSEMIDE 20 MG
1 TABLET ORAL
Qty: 60 | Refills: 0
Start: 2025-01-14 | End: 2025-02-12

## 2025-01-14 RX ORDER — ISOSORBIDE MONONITRATE 120 MG
1 TABLET, EXTENDED RELEASE 24 HR ORAL
Refills: 0 | DISCHARGE

## 2025-01-14 RX ORDER — ISOSORBIDE MONONITRATE 120 MG
1 TABLET, EXTENDED RELEASE 24 HR ORAL
Qty: 30 | Refills: 0
Start: 2025-01-14 | End: 2025-02-12

## 2025-01-14 RX ORDER — SODIUM BICARBONATE 650 MG/1
650 TABLET ORAL TWICE DAILY
Refills: 0 | Status: ACTIVE | COMMUNITY
Start: 2025-01-14

## 2025-01-14 RX ORDER — VALSARTAN 80 MG/1
1 TABLET ORAL
Refills: 0 | DISCHARGE

## 2025-01-14 RX ORDER — FERROUS SULFATE TAB EC 325 MG (65 MG FE EQUIVALENT) 325 (65 FE) MG
325 (65 FE) TABLET DELAYED RESPONSE ORAL DAILY
Refills: 0 | Status: ACTIVE | COMMUNITY
Start: 2025-01-14

## 2025-01-14 RX ORDER — FERROUS SULFATE 325(65) MG
1 TABLET ORAL
Qty: 30 | Refills: 0
Start: 2025-01-14 | End: 2025-02-12

## 2025-01-14 RX ORDER — SACUBITRIL AND VALSARTAN 24; 26 MG/1; MG/1
1 TABLET, FILM COATED ORAL
Qty: 60 | Refills: 0
Start: 2025-01-14 | End: 2025-02-12

## 2025-01-14 RX ORDER — GABAPENTIN 300 MG/1
0.5 CAPSULE ORAL
Qty: 0 | Refills: 0 | DISCHARGE

## 2025-01-14 RX ADMIN — HEPARIN SODIUM 5000 UNIT(S): 1000 INJECTION, SOLUTION INTRAVENOUS; SUBCUTANEOUS at 06:05

## 2025-01-14 RX ADMIN — Medication 100 MILLIGRAM(S): at 05:26

## 2025-01-14 RX ADMIN — Medication 1: at 08:29

## 2025-01-14 RX ADMIN — CHLORHEXIDINE GLUCONATE 1 APPLICATION(S): 1.2 RINSE ORAL at 05:26

## 2025-01-14 RX ADMIN — SACUBITRIL AND VALSARTAN 1 TABLET(S): 24; 26 TABLET, FILM COATED ORAL at 05:26

## 2025-01-14 RX ADMIN — Medication 40 MILLIGRAM(S): at 05:26

## 2025-01-14 RX ADMIN — SODIUM BICARBONATE 650 MILLIGRAM(S): 84 INJECTION, SOLUTION INTRAVENOUS at 05:26

## 2025-01-14 RX ADMIN — RANOLAZINE 500 MILLIGRAM(S): 1000 TABLET, FILM COATED, EXTENDED RELEASE ORAL at 05:26

## 2025-01-14 NOTE — PROGRESS NOTE ADULT - ASSESSMENT
74F Hungarian-speaking history significant for HTN, DM2, CKD 3, heart block s/p Micra PPM, CVA, CAD/stent, PAD, CVA, HFmrEF with recurrent hospitalization for HF in 10/8-10/14 and 11/16-11/21/24 underwent repeat LHC on 11/19/24 with critical ISR of RCA stent 99% and 95% OM1 with 95% [id number] LAD no PCI perform yet for optimization of CHF, discharged on Lasix 40mg once daily with low dose Entresto had mild BRADY, interval with worsening dyspnea despite extra Lasix use prompted return to the ER and readmitted for ADHF, pBNP 22K (prior 16K) in 12/3/24, repeat Echo with LV EF 39%, subsequently underwent CardioMems implant 12/9 (PAD 17 and PCWP 11) was discharged on 12/20/24 with Lasix 40mg once daily but with fluid overload prompted readmission 1/7/25 with pBNP 40K.

## 2025-01-14 NOTE — DISCHARGE NOTE NURSING/CASE MANAGEMENT/SOCIAL WORK - PATIENT PORTAL LINK FT
You can access the FollowMyHealth Patient Portal offered by [facility] by registering at the following website: [email or web address]. By joining [employer]’s FollowMyHealth portal, you will also be able to view your health information using other applications (apps) compatible with our system.

## 2025-01-14 NOTE — DISCHARGE NOTE NURSING/CASE MANAGEMENT/SOCIAL WORK - NSDCPEFALRISK_GEN_ALL_CORE
For information on Fall & Injury Prevention, visit: [email or web address] OR  [email or web address] OR  https://www.cdc.gov/steadi/patient.html

## 2025-01-14 NOTE — PROGRESS NOTE ADULT - PROBLEM SELECTOR PLAN 1
-ICM  -Euvolemic  -BNP from 40K -> 29K -> 11K   -Cardiomems 1/14: 12-13. 1/12 was 14. 1/11 was 11-15  -Net negative  -C/w lasix, 40 mg BID PO, Toprol  mg PO daily, and entresto 49/51 mg BID PO.  -Can follow up outpatient to start SGLTi2 -ICM  -Euvolemic  -BNP from 40K -> 29K -> 11K   -Cardiomems 1/14: 12-13. 1/12 was 14. 1/11 was 11-15  -Net negative  -C/w lasix, 40 mg BID PO, Toprol  mg PO daily, and entresto 49/51 mg BID PO.  -Can follow up outpatient to start SGLTi2  - Pt to follow up outpatient with Dr. Allen.     No further inpatient cardiac work up at this time.  Will sign off.  Please reconsult if needed.

## 2025-01-14 NOTE — PROGRESS NOTE ADULT - PROBLEM SELECTOR PLAN 2
-Known critical stenosis on [facility] from 11/2024. RCA stent 99% and 95% OM1 with 95% [id number] LAD. No intervention at that time due to lack of symptoms   -Noted elevated troponin, flat in nature  - pt denies chest pain, chest pressure and anginal equivalent syptoms   -C/w Toprol  mg PO daily, DAPT (ASA 81 mg PO daily and Plavix 75 mg PO daily), atorvastatin 80 mg PO daily.      LH as above. RCA x3 ZEINAB with rota  CRT implant as per EP -> per previous discussion with EP, outpatient follow up

## 2025-01-14 NOTE — DISCHARGE NOTE NURSING/CASE MANAGEMENT/SOCIAL WORK - FINANCIAL ASSISTANCE
[facility] provides services at a reduced cost to those who are determined to be eligible through [facility]’s financial assistance program. Information regarding [facility]’s financial assistance program can be found by going to [email or web address] or by calling [phone].

## 2025-01-14 NOTE — PROGRESS NOTE ADULT - PROVIDER SPECIALTY LIST ADULT
Cardiology
Cardiology
Hospitalist
Podiatry
Cardiology
Hospitalist
Cardiology
Hospitalist
Cardiology
Hospitalist
Cardiology
Cardiology

## 2025-01-14 NOTE — PROGRESS NOTE ADULT - SUBJECTIVE AND OBJECTIVE BOX
[facility]                                                         CARDIOLOGY AT [facility]                                                                  [address], [place] [address]                                                         Telephone: [phone]. Fax:[phone]                                                                             PROGRESS NOTE    Reason for follow up: AoC HFrEF   Update: Pt seen and examined at the bedside  used, Miguel ID#[id number]   s/p LHC with ZEINAB x3 to Right coronary artery: Angiography shows severe atherosclerosis. Prox 70%, mid 80%, distal 90% calcified stenosis. Cardiomems is 12-13.     Review of symptoms:   Cardiac:  No chest pain. No dyspnea. No palpitations.  Respiratory: no cough. No dyspnea  Gastrointestinal: No diarrhea. No abdominal pain. No bleeding.   Neuro: No focal neuro complaints.    Vitals:  T(C): 36.4 (01-14-25 @ 08:11), Max: 36.8 (01-14-25 @ 00:22)  HR: 69 (01-14-25 @ 08:11) (65 - 78)  BP: 134/77 (01-14-25 @ 08:11) (89/66 - 156/80)  RR: 18 (01-14-25 @ 08:11) (16 - 20)  SpO2: 96% (01-14-25 @ 08:11) (94% - 99%)  Wt(kg): --  I&O's Summary    13 Jan 2025 07:01  -  14 Jan 2025 07:00  --------------------------------------------------------  IN: 0 mL / OUT: 1150 mL / NET: -1150 mL    PHYSICAL EXAM:  Appearance: Comfortable. No acute distress  HEENT:  Atraumatic. Normocephalic.  Normal oral mucosa  Neurologic: A & O x 3, no gross focal deficits.  Cardiovascular: RRR S1 S2, No murmur, no rubs/gallops. No JVD  Respiratory: Lungs clear to auscultation, unlabored   Gastrointestinal:  Soft, Non-tender, + BS  Lower Extremities: 2+ Peripheral Pulses, No clubbing, cyanosis, or edema  Psychiatry: Patient is calm. No agitation.   Skin: warm and dry.  CATH SITE: R groin benign s/p cath.  No bleeding, no ecchymosis, no hematoma. Extremity Warm to touch, with palpable distal pulses, and brisk capillary refill.     CURRENT CARDIAC MEDICATIONS:  furosemide    Tablet 40 milliGRAM(s) Oral two times a day  isosorbide   mononitrate ER Tablet (IMDUR) 60 milliGRAM(s) Oral daily  metoprolol succinate  milliGRAM(s) Oral daily  ranolazine 500 milliGRAM(s) Oral two times a day  sacubitril 49 mG/valsartan 51 mG 1 Tablet(s) Oral two times a day    CURRENT OTHER MEDICATIONS:  acetaminophen     Tablet .. 650 milliGRAM(s) Oral every 6 hours PRN Temp greater or equal to 38C (100.4F), Mild Pain (1 - 3)  gabapentin 300 milliGRAM(s) Oral at bedtime  melatonin 3 milliGRAM(s) Oral at bedtime PRN Insomnia  ondansetron Injectable 4 milliGRAM(s) IV Push every 8 hours PRN Nausea and/or Vomiting  aluminum hydroxide/magnesium hydroxide/simethicone Suspension 30 milliLiter(s) Oral every 4 hours PRN Dyspepsia  atorvastatin 80 milliGRAM(s) Oral at bedtime  dextrose 50% Injectable 25 Gram(s) IV Push once, Stop order after: 1 Doses  dextrose 50% Injectable 12.5 Gram(s) IV Push once, Stop order after: 1 Doses  dextrose 50% Injectable 25 Gram(s) IV Push once, Stop order after: 1 Doses  dextrose Oral Gel 15 Gram(s) Oral once, Stop order after: 1 Doses PRN Blood Glucose LESS THAN 70 milliGRAM(s)/deciliter  glucagon  Injectable 1 milliGRAM(s) IntraMuscular once, Stop order after: 1 Doses  insulin glargine Injectable (LANTUS) 26 Unit(s) SubCutaneous at bedtime  insulin lispro (ADMELOG) corrective regimen sliding scale   SubCutaneous three times a day before meals  insulin lispro (ADMELOG) corrective regimen sliding scale   SubCutaneous at bedtime  aspirin enteric coated 81 milliGRAM(s) Oral daily  chlorhexidine 2% Cloths 1 Application(s) Topical <User Schedule>  clopidogrel Tablet 75 milliGRAM(s) Oral daily  dextrose 5%. 1000 milliLiter(s) (100 mL/Hr) IV Continuous <Continuous>  dextrose 5%. 1000 milliLiter(s) (50 mL/Hr) IV Continuous <Continuous>  ferrous    sulfate 325 milliGRAM(s) Oral daily  heparin   Injectable 5000 Unit(s) SubCutaneous every 8 hours  sodium bicarbonate 650 milliGRAM(s) Oral two times a day    LABS:	 	  01-14    138  |  106  |  29.8[H]  ----------------------------<  143[H]  4.8   |  18.0[L]  |  1.44[H]    Ca    8.5      14 Jan 2025 04:53  Mg     2.4     01-14    Lipid Profile:   HgA1c: 7.1%   TSH:     TELEMETRY: v paced   ECG: v paced     DIAGNOSTIC TESTING:  [ x] Echocardiogram: < from: TTE W or WO Ultrasound Enhancing Agent (12.05.24 @ 21:37) >   1. Technically difficult image quality.   2. Left ventricular cavity is normal in size. Left ventricular systolic function is moderately decreased with an ejection fraction of 39 % by Taylor's method of disks with an ejection fraction visually estimated at 35 to 40 %.   3. Multiple segmental abnormalities exist. See findings.   4. Unable to assess left ventricular diastolic function due to insufficient data.   5. Normal right ventricular cavity size and normal right ventricular systolic function.   6. Left atrium is normal in size.   7. Mild to moderate mitral regurgitation.   8. Mild tricuspid regurgitation.   9. Estimated pulmonary artery systolic pressure is 38 mmHg, consistent with mild pulmonary hypertension.  10. Trileaflet aortic valve. Fibrocalcific aortic valve sclerosis without stenosis.  11. Trace aortic regurgitation.  12. No pericardial effusion seen.  13. Compared to the transthoracic echocardiogram performed on 10/8/2024, there is further decline in left ventricular function, from 45-50% to 35-40%.      < end of copied text >    [x ]  Catheterization:< from: Cardiac Catheterization (01.13.25 @ 09:55) >  RCA: Prox 70%, mid 80%, distal 90% stenosis. S/p Rotational  atherectomy with RotaPro, IVUS (severe calcium) and PCI with  3 ZEINAB.    Recommendations:     C/w ASA and plavix for 6-12 months.    Acute complication:    No complications     < end of copied text >    [ ] Stress Test:    OTHER:

## 2025-01-15 ENCOUNTER — NON-APPOINTMENT (OUTPATIENT)
Age: 75
End: 2025-01-15

## 2025-01-15 RX ORDER — FUROSEMIDE 40 MG/1
40 TABLET ORAL
Qty: 180 | Refills: 1 | Status: ACTIVE | COMMUNITY
Start: 2025-01-15

## 2025-01-17 ENCOUNTER — NON-APPOINTMENT (OUTPATIENT)
Age: 75
End: 2025-01-17

## 2025-01-21 ENCOUNTER — APPOINTMENT (OUTPATIENT)
Age: 75
End: 2025-01-21
Payer: MEDICARE

## 2025-01-21 ENCOUNTER — APPOINTMENT (OUTPATIENT)
Dept: CARDIOLOGY | Facility: CLINIC | Age: 75
End: 2025-01-21

## 2025-01-21 VITALS
BODY MASS INDEX: 29.23 KG/M2 | WEIGHT: 145 LBS | HEART RATE: 80 BPM | SYSTOLIC BLOOD PRESSURE: 112 MMHG | OXYGEN SATURATION: 98 % | DIASTOLIC BLOOD PRESSURE: 58 MMHG | HEIGHT: 59 IN

## 2025-01-21 VITALS — DIASTOLIC BLOOD PRESSURE: 58 MMHG | SYSTOLIC BLOOD PRESSURE: 110 MMHG

## 2025-01-21 DIAGNOSIS — I50.9 HEART FAILURE, UNSPECIFIED: ICD-10-CM

## 2025-01-21 DIAGNOSIS — S90.30XS: ICD-10-CM

## 2025-01-21 PROCEDURE — 11042 DBRDMT SUBQ TIS 1ST 20SQCM/<: CPT | Mod: RT

## 2025-01-21 PROCEDURE — 99213 OFFICE O/P EST LOW 20 MIN: CPT | Mod: 25

## 2025-01-21 RX ORDER — INSULIN GLARGINE 100 [IU]/ML
100 INJECTION, SOLUTION SUBCUTANEOUS DAILY
Refills: 0 | Status: ACTIVE | COMMUNITY

## 2025-01-21 RX ORDER — DAPAGLIFLOZIN 10 MG/1
10 TABLET, FILM COATED ORAL DAILY
Qty: 1 | Refills: 5 | Status: ACTIVE | COMMUNITY
Start: 2025-01-21

## 2025-01-21 RX ORDER — SACUBITRIL AND VALSARTAN 97; 103 MG/1; MG/1
97-103 TABLET, FILM COATED ORAL
Refills: 0 | Status: ACTIVE | COMMUNITY
Start: 2025-01-14

## 2025-01-23 ENCOUNTER — APPOINTMENT (OUTPATIENT)
Dept: CARDIOLOGY | Facility: CLINIC | Age: 75
End: 2025-01-23

## 2025-01-27 ENCOUNTER — APPOINTMENT (OUTPATIENT)
Dept: CARDIOLOGY | Facility: CLINIC | Age: 75
End: 2025-01-27
Payer: MEDICARE

## 2025-01-27 ENCOUNTER — NON-APPOINTMENT (OUTPATIENT)
Age: 75
End: 2025-01-27

## 2025-01-27 VITALS
HEART RATE: 72 BPM | SYSTOLIC BLOOD PRESSURE: 110 MMHG | HEIGHT: 59 IN | BODY MASS INDEX: 29.23 KG/M2 | WEIGHT: 145 LBS | DIASTOLIC BLOOD PRESSURE: 80 MMHG | OXYGEN SATURATION: 98 %

## 2025-01-27 DIAGNOSIS — I50.20 UNSPECIFIED SYSTOLIC (CONGESTIVE) HEART FAILURE: ICD-10-CM

## 2025-01-27 DIAGNOSIS — I10 ESSENTIAL (PRIMARY) HYPERTENSION: ICD-10-CM

## 2025-01-27 DIAGNOSIS — Z98.61 ATHEROSCLEROTIC HEART DISEASE OF NATIVE CORONARY ARTERY W/OUT ANGINA PECTORIS: ICD-10-CM

## 2025-01-27 DIAGNOSIS — I25.10 ATHEROSCLEROTIC HEART DISEASE OF NATIVE CORONARY ARTERY W/OUT ANGINA PECTORIS: ICD-10-CM

## 2025-01-27 PROCEDURE — 93000 ELECTROCARDIOGRAM COMPLETE: CPT

## 2025-01-27 PROCEDURE — 99214 OFFICE O/P EST MOD 30 MIN: CPT | Mod: 25

## 2025-01-29 ENCOUNTER — APPOINTMENT (OUTPATIENT)
Age: 75
End: 2025-01-29
Payer: MEDICARE

## 2025-01-29 PROCEDURE — 99213 OFFICE O/P EST LOW 20 MIN: CPT | Mod: 25

## 2025-01-29 PROCEDURE — 11042 DBRDMT SUBQ TIS 1ST 20SQCM/<: CPT | Mod: RT

## 2025-01-29 RX ORDER — PEN NEEDLE, DIABETIC 29 G X1/2"
32G X 4 MM NEEDLE, DISPOSABLE MISCELLANEOUS
Qty: 100 | Refills: 0 | Status: ACTIVE | COMMUNITY
Start: 2025-01-24

## 2025-02-03 RX ORDER — ISOSORBIDE MONONITRATE 30 MG/1
30 TABLET, EXTENDED RELEASE ORAL
Qty: 90 | Refills: 0 | Status: DISCONTINUED | COMMUNITY
Start: 2025-01-25

## 2025-02-05 ENCOUNTER — APPOINTMENT (OUTPATIENT)
Age: 75
End: 2025-02-05
Payer: MEDICARE

## 2025-02-05 ENCOUNTER — APPOINTMENT (OUTPATIENT)
Dept: NEPHROLOGY | Facility: CLINIC | Age: 75
End: 2025-02-05
Payer: MEDICARE

## 2025-02-05 ENCOUNTER — NON-APPOINTMENT (OUTPATIENT)
Age: 75
End: 2025-02-05

## 2025-02-05 VITALS
DIASTOLIC BLOOD PRESSURE: 90 MMHG | BODY MASS INDEX: 29.49 KG/M2 | WEIGHT: 146 LBS | SYSTOLIC BLOOD PRESSURE: 160 MMHG | HEART RATE: 64 BPM | OXYGEN SATURATION: 99 %

## 2025-02-05 DIAGNOSIS — L97.512 NON-PRESSURE CHRONIC ULCER OF OTHER PART OF RIGHT FOOT WITH FAT LAYER EXPOSED: ICD-10-CM

## 2025-02-05 DIAGNOSIS — M79.671 PAIN IN RIGHT FOOT: ICD-10-CM

## 2025-02-05 DIAGNOSIS — I73.9 PERIPHERAL VASCULAR DISEASE, UNSPECIFIED: ICD-10-CM

## 2025-02-05 DIAGNOSIS — E11.51 TYPE 2 DIABETES MELLITUS WITH DIABETIC PERIPHERAL ANGIOPATHY W/OUT GANGRENE: ICD-10-CM

## 2025-02-05 PROCEDURE — 99214 OFFICE O/P EST MOD 30 MIN: CPT

## 2025-02-05 PROCEDURE — 11042 DBRDMT SUBQ TIS 1ST 20SQCM/<: CPT | Mod: RT

## 2025-02-05 PROCEDURE — 99213 OFFICE O/P EST LOW 20 MIN: CPT | Mod: 25

## 2025-02-07 ENCOUNTER — NON-APPOINTMENT (OUTPATIENT)
Age: 75
End: 2025-02-07

## 2025-02-12 ENCOUNTER — APPOINTMENT (OUTPATIENT)
Age: 75
End: 2025-02-12

## 2025-02-14 ENCOUNTER — NON-APPOINTMENT (OUTPATIENT)
Age: 75
End: 2025-02-14

## 2025-02-18 ENCOUNTER — APPOINTMENT (OUTPATIENT)
Dept: CARDIOLOGY | Facility: CLINIC | Age: 75
End: 2025-02-18

## 2025-02-18 VITALS
OXYGEN SATURATION: 96 % | HEART RATE: 69 BPM | BODY MASS INDEX: 29.64 KG/M2 | DIASTOLIC BLOOD PRESSURE: 74 MMHG | HEIGHT: 59 IN | WEIGHT: 147 LBS | SYSTOLIC BLOOD PRESSURE: 140 MMHG

## 2025-02-19 ENCOUNTER — APPOINTMENT (OUTPATIENT)
Age: 75
End: 2025-02-19
Payer: MEDICARE

## 2025-02-19 DIAGNOSIS — I73.9 PERIPHERAL VASCULAR DISEASE, UNSPECIFIED: ICD-10-CM

## 2025-02-19 DIAGNOSIS — E11.51 TYPE 2 DIABETES MELLITUS WITH DIABETIC PERIPHERAL ANGIOPATHY W/OUT GANGRENE: ICD-10-CM

## 2025-02-19 DIAGNOSIS — L97.512 NON-PRESSURE CHRONIC ULCER OF OTHER PART OF RIGHT FOOT WITH FAT LAYER EXPOSED: ICD-10-CM

## 2025-02-19 DIAGNOSIS — M79.671 PAIN IN RIGHT FOOT: ICD-10-CM

## 2025-02-19 PROCEDURE — 11042 DBRDMT SUBQ TIS 1ST 20SQCM/<: CPT | Mod: RT

## 2025-02-19 PROCEDURE — 99213 OFFICE O/P EST LOW 20 MIN: CPT | Mod: 25

## 2025-03-06 ENCOUNTER — APPOINTMENT (OUTPATIENT)
Age: 75
End: 2025-03-06
Payer: MEDICARE

## 2025-03-06 DIAGNOSIS — E11.51 TYPE 2 DIABETES MELLITUS WITH DIABETIC PERIPHERAL ANGIOPATHY W/OUT GANGRENE: ICD-10-CM

## 2025-03-06 DIAGNOSIS — M79.671 PAIN IN RIGHT FOOT: ICD-10-CM

## 2025-03-06 DIAGNOSIS — L97.512 NON-PRESSURE CHRONIC ULCER OF OTHER PART OF RIGHT FOOT WITH FAT LAYER EXPOSED: ICD-10-CM

## 2025-03-06 PROCEDURE — 99213 OFFICE O/P EST LOW 20 MIN: CPT | Mod: 25

## 2025-03-06 PROCEDURE — 11042 DBRDMT SUBQ TIS 1ST 20SQCM/<: CPT | Mod: RT

## 2025-03-11 ENCOUNTER — NON-APPOINTMENT (OUTPATIENT)
Age: 75
End: 2025-03-11

## 2025-03-14 ENCOUNTER — NON-APPOINTMENT (OUTPATIENT)
Age: 75
End: 2025-03-14

## 2025-03-17 ENCOUNTER — NON-APPOINTMENT (OUTPATIENT)
Age: 75
End: 2025-03-17

## 2025-03-18 ENCOUNTER — NON-APPOINTMENT (OUTPATIENT)
Age: 75
End: 2025-03-18

## 2025-03-19 ENCOUNTER — NON-APPOINTMENT (OUTPATIENT)
Age: 75
End: 2025-03-19

## 2025-03-19 ENCOUNTER — INPATIENT (INPATIENT)
Facility: HOSPITAL | Age: 75
LOS: 11 days | Discharge: HOME | DRG: 282 | End: 2025-03-31
Attending: STUDENT IN AN ORGANIZED HEALTH CARE EDUCATION/TRAINING PROGRAM | Admitting: STUDENT IN AN ORGANIZED HEALTH CARE EDUCATION/TRAINING PROGRAM
Payer: MEDICARE

## 2025-03-19 VITALS
SYSTOLIC BLOOD PRESSURE: 127 MMHG | DIASTOLIC BLOOD PRESSURE: 64 MMHG | WEIGHT: 156.53 LBS | RESPIRATION RATE: 18 BRPM | HEART RATE: 61 BPM | OXYGEN SATURATION: 97 % | TEMPERATURE: 99 F

## 2025-03-19 DIAGNOSIS — Z98.890 OTHER SPECIFIED POSTPROCEDURAL STATES: Chronic | ICD-10-CM

## 2025-03-19 DIAGNOSIS — I21.4 NON-ST ELEVATION (NSTEMI) MYOCARDIAL INFARCTION: ICD-10-CM

## 2025-03-19 DIAGNOSIS — Z95.0 PRESENCE OF CARDIAC PACEMAKER: Chronic | ICD-10-CM

## 2025-03-19 DIAGNOSIS — Z86.011 PERSONAL HISTORY OF BENIGN NEOPLASM OF THE BRAIN: Chronic | ICD-10-CM

## 2025-03-19 DIAGNOSIS — H26.9 UNSPECIFIED CATARACT: Chronic | ICD-10-CM

## 2025-03-19 DIAGNOSIS — I50.23 ACUTE ON CHRONIC SYSTOLIC (CONGESTIVE) HEART FAILURE: ICD-10-CM

## 2025-03-19 LAB
ALBUMIN SERPL ELPH-MCNC: 3.7 G/DL — SIGNIFICANT CHANGE UP (ref 3.3–5.2)
ALP SERPL-CCNC: 127 U/L — HIGH (ref 40–120)
ALT FLD-CCNC: 19 U/L — SIGNIFICANT CHANGE UP
ANION GAP SERPL CALC-SCNC: 13 MMOL/L — SIGNIFICANT CHANGE UP (ref 5–17)
APTT BLD: 32.8 SEC — SIGNIFICANT CHANGE UP (ref 24.5–35.6)
AST SERPL-CCNC: 27 U/L — SIGNIFICANT CHANGE UP
BASOPHILS # BLD AUTO: 0.04 K/UL — SIGNIFICANT CHANGE UP (ref 0–0.2)
BASOPHILS NFR BLD AUTO: 0.4 % — SIGNIFICANT CHANGE UP (ref 0–2)
BILIRUB SERPL-MCNC: 0.9 MG/DL — SIGNIFICANT CHANGE UP (ref 0.4–2)
BUN SERPL-MCNC: 42.1 MG/DL — HIGH (ref 8–20)
CALCIUM SERPL-MCNC: 8 MG/DL — LOW (ref 8.4–10.5)
CHLORIDE SERPL-SCNC: 94 MMOL/L — LOW (ref 96–108)
CO2 SERPL-SCNC: 25 MMOL/L — SIGNIFICANT CHANGE UP (ref 22–29)
CREAT SERPL-MCNC: 2.08 MG/DL — HIGH (ref 0.5–1.3)
EGFR: 25 ML/MIN/1.73M2 — LOW
EGFR: 25 ML/MIN/1.73M2 — LOW
EOSINOPHIL # BLD AUTO: 0.18 K/UL — SIGNIFICANT CHANGE UP (ref 0–0.5)
EOSINOPHIL NFR BLD AUTO: 2 % — SIGNIFICANT CHANGE UP (ref 0–6)
GLUCOSE SERPL-MCNC: 340 MG/DL — HIGH (ref 70–99)
HCT VFR BLD CALC: 27.8 % — LOW (ref 34.5–45)
HGB BLD-MCNC: 9.3 G/DL — LOW (ref 11.5–15.5)
IMM GRANULOCYTES # BLD AUTO: 0.04 K/UL — SIGNIFICANT CHANGE UP (ref 0–0.07)
IMM GRANULOCYTES NFR BLD AUTO: 0.4 % — SIGNIFICANT CHANGE UP (ref 0–0.9)
INR BLD: 1.18 RATIO — HIGH (ref 0.85–1.16)
LYMPHOCYTES # BLD AUTO: 2.32 K/UL — SIGNIFICANT CHANGE UP (ref 1–3.3)
LYMPHOCYTES NFR BLD AUTO: 25.5 % — SIGNIFICANT CHANGE UP (ref 13–44)
MAGNESIUM SERPL-MCNC: 2.7 MG/DL — HIGH (ref 1.6–2.6)
MCHC RBC-ENTMCNC: 29 PG — SIGNIFICANT CHANGE UP (ref 27–34)
MCHC RBC-ENTMCNC: 33.5 G/DL — SIGNIFICANT CHANGE UP (ref 32–36)
MCV RBC AUTO: 86.6 FL — SIGNIFICANT CHANGE UP (ref 80–100)
MONOCYTES # BLD AUTO: 0.78 K/UL — SIGNIFICANT CHANGE UP (ref 0–0.9)
MONOCYTES NFR BLD AUTO: 8.6 % — SIGNIFICANT CHANGE UP (ref 2–14)
NEUTROPHILS # BLD AUTO: 5.75 K/UL — SIGNIFICANT CHANGE UP (ref 1.8–7.4)
NEUTROPHILS NFR BLD AUTO: 63.1 % — SIGNIFICANT CHANGE UP (ref 43–77)
NRBC # BLD AUTO: 0 K/UL — SIGNIFICANT CHANGE UP (ref 0–0)
NRBC # FLD: 0 K/UL — SIGNIFICANT CHANGE UP (ref 0–0)
NRBC BLD AUTO-RTO: 0 /100 WBCS — SIGNIFICANT CHANGE UP (ref 0–0)
NT-PROBNP SERPL-SCNC: HIGH PG/ML (ref 0–300)
PLATELET # BLD AUTO: 278 K/UL — SIGNIFICANT CHANGE UP (ref 150–400)
PMV BLD: 10.4 FL — SIGNIFICANT CHANGE UP (ref 7–13)
POTASSIUM SERPL-MCNC: 3.9 MMOL/L — SIGNIFICANT CHANGE UP (ref 3.5–5.3)
POTASSIUM SERPL-SCNC: 3.9 MMOL/L — SIGNIFICANT CHANGE UP (ref 3.5–5.3)
PROT SERPL-MCNC: 7.1 G/DL — SIGNIFICANT CHANGE UP (ref 6.6–8.7)
PROTHROM AB SERPL-ACNC: 13.7 SEC — HIGH (ref 9.9–13.4)
RBC # BLD: 3.21 M/UL — LOW (ref 3.8–5.2)
RBC # FLD: 14.9 % — HIGH (ref 10.3–14.5)
SODIUM SERPL-SCNC: 132 MMOL/L — LOW (ref 135–145)
TROPONIN T, HIGH SENSITIVITY RESULT: 906 NG/L — HIGH (ref 0–51)
WBC # BLD: 9.11 K/UL — SIGNIFICANT CHANGE UP (ref 3.8–10.5)
WBC # FLD AUTO: 9.11 K/UL — SIGNIFICANT CHANGE UP (ref 3.8–10.5)

## 2025-03-19 PROCEDURE — 71045 X-RAY EXAM CHEST 1 VIEW: CPT | Mod: 26

## 2025-03-19 PROCEDURE — 76937 US GUIDE VASCULAR ACCESS: CPT | Mod: 26

## 2025-03-19 PROCEDURE — 99223 1ST HOSP IP/OBS HIGH 75: CPT

## 2025-03-19 PROCEDURE — 99285 EMERGENCY DEPT VISIT HI MDM: CPT

## 2025-03-19 RX ORDER — HEPARIN SODIUM 1000 [USP'U]/ML
INJECTION INTRAVENOUS; SUBCUTANEOUS
Qty: 25000 | Refills: 0 | Status: DISCONTINUED | OUTPATIENT
Start: 2025-03-19 | End: 2025-03-21

## 2025-03-19 RX ORDER — ASPIRIN 325 MG
324 TABLET ORAL ONCE
Refills: 0 | Status: COMPLETED | OUTPATIENT
Start: 2025-03-19 | End: 2025-03-19

## 2025-03-19 RX ORDER — FUROSEMIDE 10 MG/ML
40 INJECTION INTRAMUSCULAR; INTRAVENOUS ONCE
Refills: 0 | Status: COMPLETED | OUTPATIENT
Start: 2025-03-19 | End: 2025-03-19

## 2025-03-19 RX ORDER — HEPARIN SODIUM 1000 [USP'U]/ML
4400 INJECTION INTRAVENOUS; SUBCUTANEOUS EVERY 6 HOURS
Refills: 0 | Status: DISCONTINUED | OUTPATIENT
Start: 2025-03-19 | End: 2025-03-21

## 2025-03-19 RX ORDER — HEPARIN SODIUM 1000 [USP'U]/ML
4400 INJECTION INTRAVENOUS; SUBCUTANEOUS ONCE
Refills: 0 | Status: COMPLETED | OUTPATIENT
Start: 2025-03-19 | End: 2025-03-20

## 2025-03-19 RX ADMIN — FUROSEMIDE 40 MILLIGRAM(S): 10 INJECTION INTRAMUSCULAR; INTRAVENOUS at 23:44

## 2025-03-19 NOTE — ED PROVIDER NOTE - PHYSICAL EXAMINATION
Constitutional: Awake, alert, in no acute distress  Eyes: no scleral icterus  HENT: normocephalic, atraumatic, moist oral mucosa  Neck: supple  CV: RRR, no murmur  Pulm: non-labored respirations, CTAB  Abdomen: soft, non-tender, non-distended  Extremities: +mild pedal edema, no deformity  Skin: no rash, no jaundice  Neuro: AAOx3, moving all extremities equally

## 2025-03-19 NOTE — ED ADULT NURSE NOTE - NSFALLUNIVINTERV_ED_ALL_ED
Bed/Stretcher in lowest position, wheels locked, appropriate side rails in place/Call bell, personal items and telephone in reach/Instruct patient to call for assistance before getting out of bed/chair/stretcher/Non-slip footwear applied when patient is off stretcher/[place] to call system/Physically safe environment - no spills, clutter or unnecessary equipment/Purposeful proactive rounding/Room/bathroom lighting operational, light cord in reach

## 2025-03-19 NOTE — ED PROVIDER NOTE - NS ED ROS FT
Constitutional: +fever  CV: +chest pain  Resp: +cough, +shortness of breath  GI: no abdominal pain, no vomiting, no diarrhea  : no dysuria  MSK: no joint pain  Neuro: no headache

## 2025-03-19 NOTE — ED PROVIDER NOTE - CARE PLAN
1 Principal Discharge DX:	NSTEMI (non-ST elevation myocardial infarction)  Secondary Diagnosis:	Acute on chronic HFrEF (heart failure with reduced ejection fraction)

## 2025-03-19 NOTE — ED PROVIDER NOTE - OBJECTIVE STATEMENT
74y F w/ hx HTN, DM2, CKD 3, CAD s/p stent, CHF s/p CardioMEMS, heart block s/p Micra PPM, CVA, PAD, CVA; presents for shortness of breath. Pt endorsing increased trouble breathing and leg swelling over the past few days. Reports having fever 3 days ago. Also endorsing cough and chest discomfort. Was seen by cardiology and had lasix dose increased. Was also noted to have elevated CardioMEMS readings and 3-lb. weight gain.

## 2025-03-19 NOTE — ED ADULT TRIAGE NOTE - CHIEF COMPLAINT QUOTE
Pt stated that she has been experiencing fever, body aches and  'abnormal heart beat'. Stated that she believes she is retaining fluids in her legs.  Pt was seen by cardiologist and sent in for further evaluation.

## 2025-03-19 NOTE — CONSULT NOTE ADULT - SUBJECTIVE AND OBJECTIVE BOX
[facility]                                              CARDIOLOGY AT [address], [place] [address]                                             Telephone: [phone]. Fax:[phone]                                                      CARDIOLOGY CONSULTATION NOTE                                                                                             History obtained by: Patient and medical record  Community Cardiologist: Dr. Allen   obtained: Yes [ X ] No [  ] Stephan #[id number]  Reason for Consultation: HF  Available out pt records reviewed: Yes [ X ] No [  ]    Chief complaint: Patient is a 74y old  Female who presents with a chief complaint of     HPI: 74F with PMHx HTN, CAD S/P PCI x3 (01/13/2025), HFrEF, CardioMEMS, heart block S/P micra PPM, PAD, CVA, DM, CKD. Patient was sent to [facility] ED from cardiology office with complaints of SOB and lower extremity swelling x3 days. Patient endorses fever and cough. Swelling was not improving despite increasing PO doses of Lasix at home. CardioMEMS readings were at 24, goal is 14, and patient has also noted weight gain. Patient states she has been experiencing exertional chest pain that improves with rest. Denies headaches, dizziness, palpitations, nausea, or diaphoresis. Denies recent sick contacts or recent travel. Cardiology was consulted for HF exacerbation and patient follows with Dr. Allen. On presentation, initial troponin was 906.    CARDIAC TESTING   ECHO:  < from: TTE W or WO Ultrasound Enhancing Agent (12.05.24 @ 21:37) >  CONCLUSIONS:   1. Technically difficult image quality.   2. Left ventricular cavity is normal in size. Left ventricular systolic function is moderately decreased with an ejection fraction of 39 % by Taylor's method of disks with an ejection fraction visually estimated at 35 to 40 %.   3. Multiple segmental abnormalities exist. See findings.   4. Unable to assess left ventricular diastolic function due to insufficient data.   5. Normal right ventricular cavity size and normal right ventricular systolic function.   6. Left atrium is normal in size.   7. Mild to moderate mitral regurgitation.   8. Mild tricuspid regurgitation.   9. Estimated pulmonary artery systolic pressure is 38 mmHg, consistent with mild pulmonary hypertension.  10. Trileaflet aortic valve. Fibrocalcific aortic valve sclerosis without stenosis.  11. Trace aortic regurgitation.  12. No pericardial effusion seen.  13. Compared to the transthoracic echocardiogram performed on 10/8/2024, there is further decline in left ventricular function, from 45-50% to 35-40%.  < end of copied text >    CATH:   < from: Cardiac Catheterization (01.13.25 @ 09:55) >  Conclusions:   Syntax Score: Intermediate    RCA: Prox 70%, mid 80%, distal 90% stenosis. S/p Rotational  atherectomy with RotaPro, IVUS (severe calcium) and PCI with  3 ZEINAB.    < end of copied text >    PAST MEDICAL HISTORY  DM  HTN  Acute otitis media, unspecified otitis media type  GERD  Cardiac pacemaker  CVA  PAD    PAST SURGICAL HISTORY  Cataract  Cardiac pacemaker  Angiogram of extremity    SOCIAL HISTORY:  Denies smoking/alcohol/drugs    FAMILY HISTORY  FH: asthma  Son    HOME MEDICATIONS  gabapentin 800 mg oral tablet: 0.5 tab(s) orally once a day (14 Jan 2025 11:55)  insulin glargine 100 units/mL subcutaneous solution: 26 unit(s) subcutaneous once a day (at bedtime) (09 Jan 2025 14:25)  metoprolol succinate 100 mg oral tablet, extended release: 1 tab(s) orally once a day (09 Jan 2025 14:25)  pantoprazole 20 mg oral delayed release tablet: 1 tab(s) orally once a day (09 Jan 2025 14:25)  ranolazine 500 mg oral tablet, extended release: 1 tab(s) orally 2 times a day (09 Jan 2025 14:25)    CURRENT CARDIAC MEDICATIONS  furosemide   Injectable 40 milliGRAM(s) IV Push Once, Stop order after: 1 Doses    ALLERGIES  No Known Allergies    REVIEW OF SYMPTOMS  CONSTITUTIONAL: No fever, no chills, no weight loss, no weight gain, no fatigue   ENMT:  No vertigo; No sinus or throat pain  NECK: No pain or stiffness  CARDIOVASCULAR: +chest pain, no dyspnea, no syncope/presyncope, no palpitations, no dizziness, no Orthopnea, no Paroxsymal nocturnal dyspnea  RESPIRATORY: +Shortness of breath, no cough, no wheezing  : No dysuria, no hematuria   GI: No dark color stool, no nausea, no diarrhea, no constipation, no abdominal pain   NEURO: No headache, no slurred speech   MUSCULOSKELETAL: +lower extremity swelling; No joint pain; No muscle, back, or extremity pain  PSYCH: No agitation, no anxiety.    ALL OTHER REVIEW OF SYSTEMS ARE NEGATIVE.    VITAL SIGNS  T(C): 37.1 (03-19-25 @ 19:21), Max: 37.1 (03-19-25 @ 19:21)  T(F): 98.8 (03-19-25 @ 19:21), Max: 98.8 (03-19-25 @ 19:21)  HR: 61 (03-19-25 @ 19:21) (61 - 61)  BP: 127/64 (03-19-25 @ 19:21) (127/64 - 127/64)  RR: 18 (03-19-25 @ 19:21) (18 - 18)  SpO2: 97% (03-19-25 @ 19:21) (97% - 97%)    PHYSICAL EXAM  Constitutional: Comfortable . No acute distress.   HEENT: Atraumatic and normocephalic , neck is supple . no JVD. No carotid bruit.  CNS: A&Ox3. No focal deficits.   Respiratory: CTAB, unlabored   Cardiovascular: RRR normal s1 s2. No murmur. No rubs or gallop.  Gastrointestinal: Soft, non-tender. +Bowel sounds.   Extremities: 2+ Peripheral Pulses, No clubbing, cyanosis, or edema  Psychiatric: Calm . no agitation.   Skin: Warm and dry, no ulcers on extremities     LABS             9.3    9.11  )-----------( 278      ( 19 Mar 2025 22:08 )             27.8     132[L]  |  94[L]  |  42.1[H]  ----------------------------<  340[H]  3.9   |  25.0  |  2.08[H]    Ca    8.0[L]      19 Mar 2025 22:08  Mg     2.7     03-19    TPro  7.1  /  Alb  3.7  /  TBili  0.9  /  DBili  x   /  AST  27  /  ALT  19  /  AlkPhos  127[H]  03-19  PT/INR - ( 19 Mar 2025 22:08 )   PT: 13.7 sec;   INR: 1.18 ratio    PTT - ( 19 Mar 2025 22:08 )  PTT:32.8 sec    Urinalysis Basic - ( 19 Mar 2025 22:08 )  Color: x / Appearance: x / SG: x / pH: x  Gluc: 340 mg/dL / Ketone: x  / Bili: x / Urobili: x   Blood: x / Protein: x / Nitrite: x   Leuk Esterase: x / RBC: x / WBC x   Sq Epi: x / Non Sq Epi: x / Bacteria: x    INTERPRETATION OF TELEMETRY: v-paced, 64 BPM    ECG:  < from: 12 Lead ECG (03.19.25 @ 19:32) >  Ventricular Rate 62 BPM  Atrial Rate 78 BPM  QRS Duration 154 ms  Q-T Interval 488 ms  QTC Calculation(Bazett) 495 ms  P Axis 53 degrees  R Axis 101 degrees  T Axis 231 degrees  Diagnosis Line Ventricular-paced rhythm  Abnormal ECG  Confirmed by ANTONINA PEREZ ([id number]) on 3/19/2025 9:17:58 PM  < end of copied text >  Prior ECG: Yes [ X ] No [  ]

## 2025-03-19 NOTE — CONSULT NOTE ADULT - PROBLEM SELECTOR RECOMMENDATION 9
.  - EKG ventricular paced at 62, unchanged from previous  - Patient endorsing exertional chest pain that improves with rest  - Troponin 906; trend q6h until peak  - Cardiac cath on 01/13/2025 - RCA: Prox 70%, mid 80%, distal 90% stenosis; S/P Rotational atherectomy with RotaPro, IVUS (severe calcium) and PCI with 3 ZEINAB  - TTE from 12/2024 with EF of 39% with multiple segment abnormalities, mild-mod MR, and mild TR  - Keep NPO after midnight, except for medications  - Heparin gtt, ACS nomogram and ASA load  - Continue with ASA 81 mg daily, atorvastatin 80 mg daily, Ranolazine 500 mg BID, Imdur 60 mg daily  - Continuous telemetry monitoring

## 2025-03-19 NOTE — CONSULT NOTE ADULT - ASSESSMENT
74F with PMHx HTN, CAD S/P PCI x3 (01/13/2025), HFrEF, CardioMEMS, heart block S/P micra PPM, PAD, CVA, DM, CKD. Patient was sent to [facility] ED from cardiology office with complaints of SOB and lower extremity swelling x3 days. Patient endorses fever and cough. Swelling was not improving despite increasing PO doses of Lasix at home. CardioMEMS readings were at 24, goal is 14, and patient has also noted weight gain. Patient states she has been experiencing exertional chest pain that improves with rest. Denies headaches, dizziness, palpitations, nausea, or diaphoresis. Denies recent sick contacts or recent travel. Cardiology was consulted for HF exacerbation and patient follows with Dr. Allen. On presentation, initial troponin was 906.

## 2025-03-19 NOTE — ED ADULT NURSE NOTE - CHPI ED NUR SYMPTOMS NEG
no back pain/no chest pain/no congestion/no diaphoresis/no dizziness/no nausea/no shortness of breath/no syncope/no vomiting Universal Safety Interventions

## 2025-03-19 NOTE — ED PROVIDER NOTE - CLINICAL SUMMARY MEDICAL DECISION MAKING FREE TEXT BOX
74y F w/ hx CAD, CHF: presents for shortness of breath. Was noted to have abnormal CardioMEMS reading in outpatient cardiology office. Pt hemodynamically stable, satting well on RA, in no acute respiratory distress. Will check labs, EKG, CXR, consult cardiology. 74y F w/ hx CAD, CHF: presents for shortness of breath. Was noted to have abnormal CardioMEMS reading in outpatient cardiology office. Pt hemodynamically stable, satting well on RA, in no acute respiratory distress. Will check labs, EKG, CXR, consult cardiology.    Labs showing elevated troponin and proBNP. Case discussed with cardiology -- advising starting heparin drip. Admitted to telemetry.

## 2025-03-20 ENCOUNTER — APPOINTMENT (OUTPATIENT)
Age: 75
End: 2025-03-20

## 2025-03-20 DIAGNOSIS — I21.4 NON-ST ELEVATION (NSTEMI) MYOCARDIAL INFARCTION: ICD-10-CM

## 2025-03-20 LAB
ANION GAP SERPL CALC-SCNC: 14 MMOL/L — SIGNIFICANT CHANGE UP (ref 5–17)
APTT BLD: 108.4 SEC — HIGH (ref 24.5–35.6)
APTT BLD: 52 SEC — HIGH (ref 24.5–35.6)
APTT BLD: 65.6 SEC — HIGH (ref 24.5–35.6)
BUN SERPL-MCNC: 42.2 MG/DL — HIGH (ref 8–20)
CALCIUM SERPL-MCNC: 7.8 MG/DL — LOW (ref 8.4–10.5)
CHLORIDE SERPL-SCNC: 100 MMOL/L — SIGNIFICANT CHANGE UP (ref 96–108)
CO2 SERPL-SCNC: 25 MMOL/L — SIGNIFICANT CHANGE UP (ref 22–29)
CREAT SERPL-MCNC: 1.98 MG/DL — HIGH (ref 0.5–1.3)
EGFR: 26 ML/MIN/1.73M2 — LOW
EGFR: 26 ML/MIN/1.73M2 — LOW
GLUCOSE BLDC GLUCOMTR-MCNC: 118 MG/DL — HIGH (ref 70–99)
GLUCOSE BLDC GLUCOMTR-MCNC: 151 MG/DL — HIGH (ref 70–99)
GLUCOSE BLDC GLUCOMTR-MCNC: 170 MG/DL — HIGH (ref 70–99)
GLUCOSE BLDC GLUCOMTR-MCNC: 187 MG/DL — HIGH (ref 70–99)
GLUCOSE BLDC GLUCOMTR-MCNC: 208 MG/DL — HIGH (ref 70–99)
GLUCOSE SERPL-MCNC: 175 MG/DL — HIGH (ref 70–99)
HCT VFR BLD CALC: 28.2 % — LOW (ref 34.5–45)
HGB BLD-MCNC: 9.4 G/DL — LOW (ref 11.5–15.5)
MCHC RBC-ENTMCNC: 29.1 PG — SIGNIFICANT CHANGE UP (ref 27–34)
MCHC RBC-ENTMCNC: 33.3 G/DL — SIGNIFICANT CHANGE UP (ref 32–36)
MCV RBC AUTO: 87.3 FL — SIGNIFICANT CHANGE UP (ref 80–100)
MRSA PCR RESULT.: SIGNIFICANT CHANGE UP
NRBC # BLD AUTO: 0 K/UL — SIGNIFICANT CHANGE UP (ref 0–0)
NRBC # FLD: 0 K/UL — SIGNIFICANT CHANGE UP (ref 0–0)
NRBC BLD AUTO-RTO: 0 /100 WBCS — SIGNIFICANT CHANGE UP (ref 0–0)
PLATELET # BLD AUTO: 283 K/UL — SIGNIFICANT CHANGE UP (ref 150–400)
PMV BLD: 10.4 FL — SIGNIFICANT CHANGE UP (ref 7–13)
POTASSIUM SERPL-MCNC: 4 MMOL/L — SIGNIFICANT CHANGE UP (ref 3.5–5.3)
POTASSIUM SERPL-SCNC: 4 MMOL/L — SIGNIFICANT CHANGE UP (ref 3.5–5.3)
RBC # BLD: 3.23 M/UL — LOW (ref 3.8–5.2)
RBC # FLD: 15 % — HIGH (ref 10.3–14.5)
S AUREUS DNA NOSE QL NAA+PROBE: SIGNIFICANT CHANGE UP
SODIUM SERPL-SCNC: 139 MMOL/L — SIGNIFICANT CHANGE UP (ref 135–145)
TROPONIN T, HIGH SENSITIVITY RESULT: 854 NG/L — HIGH (ref 0–51)
WBC # BLD: 8.37 K/UL — SIGNIFICANT CHANGE UP (ref 3.8–10.5)
WBC # FLD AUTO: 8.37 K/UL — SIGNIFICANT CHANGE UP (ref 3.8–10.5)

## 2025-03-20 PROCEDURE — 99497 ADVNCD CARE PLAN 30 MIN: CPT | Mod: 25

## 2025-03-20 PROCEDURE — 93010 ELECTROCARDIOGRAM REPORT: CPT

## 2025-03-20 PROCEDURE — 99223 1ST HOSP IP/OBS HIGH 75: CPT

## 2025-03-20 RX ORDER — ONDANSETRON HCL/PF 4 MG/2 ML
4 VIAL (ML) INJECTION EVERY 8 HOURS
Refills: 0 | Status: DISCONTINUED | OUTPATIENT
Start: 2025-03-20 | End: 2025-03-31

## 2025-03-20 RX ORDER — FERROUS SULFATE 137(45) MG
325 TABLET, EXTENDED RELEASE ORAL DAILY
Refills: 0 | Status: DISCONTINUED | OUTPATIENT
Start: 2025-03-20 | End: 2025-03-31

## 2025-03-20 RX ORDER — DEXTROSE 50 % IN WATER 50 %
15 SYRINGE (ML) INTRAVENOUS ONCE
Refills: 0 | Status: DISCONTINUED | OUTPATIENT
Start: 2025-03-20 | End: 2025-03-31

## 2025-03-20 RX ORDER — INSULIN GLARGINE-YFGN 100 [IU]/ML
13 INJECTION, SOLUTION SUBCUTANEOUS AT BEDTIME
Refills: 0 | Status: DISCONTINUED | OUTPATIENT
Start: 2025-03-20 | End: 2025-03-21

## 2025-03-20 RX ORDER — RANOLAZINE 1000 MG/1
500 TABLET, FILM COATED, EXTENDED RELEASE ORAL
Refills: 0 | Status: DISCONTINUED | OUTPATIENT
Start: 2025-03-20 | End: 2025-03-31

## 2025-03-20 RX ORDER — MELATONIN 5 MG
3 TABLET ORAL AT BEDTIME
Refills: 0 | Status: DISCONTINUED | OUTPATIENT
Start: 2025-03-20 | End: 2025-03-31

## 2025-03-20 RX ORDER — ASPIRIN 325 MG
81 TABLET ORAL DAILY
Refills: 0 | Status: DISCONTINUED | OUTPATIENT
Start: 2025-03-20 | End: 2025-03-31

## 2025-03-20 RX ORDER — DEXTROSE 50 % IN WATER 50 %
25 SYRINGE (ML) INTRAVENOUS ONCE
Refills: 0 | Status: DISCONTINUED | OUTPATIENT
Start: 2025-03-20 | End: 2025-03-31

## 2025-03-20 RX ORDER — SODIUM CHLORIDE 9 G/1000ML
1000 INJECTION, SOLUTION INTRAVENOUS
Refills: 0 | Status: DISCONTINUED | OUTPATIENT
Start: 2025-03-20 | End: 2025-03-31

## 2025-03-20 RX ORDER — FUROSEMIDE 10 MG/ML
40 INJECTION INTRAMUSCULAR; INTRAVENOUS EVERY 12 HOURS
Refills: 0 | Status: DISCONTINUED | OUTPATIENT
Start: 2025-03-20 | End: 2025-03-20

## 2025-03-20 RX ORDER — DAPAGLIFLOZIN 5 MG/1
10 TABLET, FILM COATED ORAL EVERY 24 HOURS
Refills: 0 | Status: DISCONTINUED | OUTPATIENT
Start: 2025-03-20 | End: 2025-03-20

## 2025-03-20 RX ORDER — ATORVASTATIN CALCIUM 80 MG/1
80 TABLET, FILM COATED ORAL AT BEDTIME
Refills: 0 | Status: DISCONTINUED | OUTPATIENT
Start: 2025-03-20 | End: 2025-03-31

## 2025-03-20 RX ORDER — METOPROLOL SUCCINATE 50 MG/1
100 TABLET, EXTENDED RELEASE ORAL DAILY
Refills: 0 | Status: DISCONTINUED | OUTPATIENT
Start: 2025-03-20 | End: 2025-03-24

## 2025-03-20 RX ORDER — DEXTROSE 50 % IN WATER 50 %
12.5 SYRINGE (ML) INTRAVENOUS ONCE
Refills: 0 | Status: DISCONTINUED | OUTPATIENT
Start: 2025-03-20 | End: 2025-03-31

## 2025-03-20 RX ORDER — SACUBITRIL AND VALSARTAN 6; 6 MG/1; MG/1
1 PELLET ORAL
Refills: 0 | Status: DISCONTINUED | OUTPATIENT
Start: 2025-03-20 | End: 2025-03-20

## 2025-03-20 RX ORDER — GLUCAGON 3 MG/1
1 POWDER NASAL ONCE
Refills: 0 | Status: DISCONTINUED | OUTPATIENT
Start: 2025-03-20 | End: 2025-03-31

## 2025-03-20 RX ORDER — MAGNESIUM, ALUMINUM HYDROXIDE 200-200 MG
30 TABLET,CHEWABLE ORAL EVERY 4 HOURS
Refills: 0 | Status: DISCONTINUED | OUTPATIENT
Start: 2025-03-20 | End: 2025-03-31

## 2025-03-20 RX ORDER — INSULIN LISPRO 100 U/ML
INJECTION, SOLUTION INTRAVENOUS; SUBCUTANEOUS EVERY 6 HOURS
Refills: 0 | Status: DISCONTINUED | OUTPATIENT
Start: 2025-03-20 | End: 2025-03-22

## 2025-03-20 RX ORDER — GABAPENTIN 400 MG/1
400 CAPSULE ORAL AT BEDTIME
Refills: 0 | Status: DISCONTINUED | OUTPATIENT
Start: 2025-03-20 | End: 2025-03-31

## 2025-03-20 RX ORDER — ACETAMINOPHEN 500 MG/5ML
650 LIQUID (ML) ORAL EVERY 6 HOURS
Refills: 0 | Status: DISCONTINUED | OUTPATIENT
Start: 2025-03-20 | End: 2025-03-31

## 2025-03-20 RX ORDER — CLOPIDOGREL BISULFATE 75 MG/1
75 TABLET, FILM COATED ORAL DAILY
Refills: 0 | Status: DISCONTINUED | OUTPATIENT
Start: 2025-03-20 | End: 2025-03-21

## 2025-03-20 RX ORDER — SODIUM CHLORIDE 9 G/1000ML
1000 INJECTION, SOLUTION INTRAVENOUS
Refills: 0 | Status: DISCONTINUED | OUTPATIENT
Start: 2025-03-20 | End: 2025-03-21

## 2025-03-20 RX ORDER — ISOSORBIDE MONONITRATE 60 MG/1
60 TABLET, EXTENDED RELEASE ORAL DAILY
Refills: 0 | Status: DISCONTINUED | OUTPATIENT
Start: 2025-03-20 | End: 2025-03-23

## 2025-03-20 RX ADMIN — ATORVASTATIN CALCIUM 80 MILLIGRAM(S): 80 TABLET, FILM COATED ORAL at 22:14

## 2025-03-20 RX ADMIN — HEPARIN SODIUM 850 UNIT(S)/HR: 1000 INJECTION INTRAVENOUS; SUBCUTANEOUS at 14:56

## 2025-03-20 RX ADMIN — Medication 650 MILLIGRAM(S): at 22:14

## 2025-03-20 RX ADMIN — HEPARIN SODIUM 700 UNIT(S)/HR: 1000 INJECTION INTRAVENOUS; SUBCUTANEOUS at 08:29

## 2025-03-20 RX ADMIN — HEPARIN SODIUM 0 UNIT(S)/HR: 1000 INJECTION INTRAVENOUS; SUBCUTANEOUS at 07:26

## 2025-03-20 RX ADMIN — Medication 325 MILLIGRAM(S): at 11:22

## 2025-03-20 RX ADMIN — Medication 324 MILLIGRAM(S): at 00:38

## 2025-03-20 RX ADMIN — INSULIN LISPRO 2: 100 INJECTION, SOLUTION INTRAVENOUS; SUBCUTANEOUS at 06:49

## 2025-03-20 RX ADMIN — HEPARIN SODIUM 4400 UNIT(S): 1000 INJECTION INTRAVENOUS; SUBCUTANEOUS at 00:35

## 2025-03-20 RX ADMIN — HEPARIN SODIUM 900 UNIT(S)/HR: 1000 INJECTION INTRAVENOUS; SUBCUTANEOUS at 00:35

## 2025-03-20 RX ADMIN — Medication 81 MILLIGRAM(S): at 11:22

## 2025-03-20 RX ADMIN — CLOPIDOGREL BISULFATE 75 MILLIGRAM(S): 75 TABLET, FILM COATED ORAL at 11:22

## 2025-03-20 RX ADMIN — ISOSORBIDE MONONITRATE 60 MILLIGRAM(S): 60 TABLET, EXTENDED RELEASE ORAL at 11:22

## 2025-03-20 RX ADMIN — RANOLAZINE 500 MILLIGRAM(S): 1000 TABLET, FILM COATED, EXTENDED RELEASE ORAL at 17:53

## 2025-03-20 RX ADMIN — METOPROLOL SUCCINATE 100 MILLIGRAM(S): 50 TABLET, EXTENDED RELEASE ORAL at 05:41

## 2025-03-20 RX ADMIN — GABAPENTIN 400 MILLIGRAM(S): 400 CAPSULE ORAL at 22:14

## 2025-03-20 RX ADMIN — DAPAGLIFLOZIN 10 MILLIGRAM(S): 5 TABLET, FILM COATED ORAL at 05:42

## 2025-03-20 RX ADMIN — RANOLAZINE 500 MILLIGRAM(S): 1000 TABLET, FILM COATED, EXTENDED RELEASE ORAL at 05:41

## 2025-03-20 RX ADMIN — HEPARIN SODIUM 850 UNIT(S)/HR: 1000 INJECTION INTRAVENOUS; SUBCUTANEOUS at 22:46

## 2025-03-20 RX ADMIN — FUROSEMIDE 40 MILLIGRAM(S): 10 INJECTION INTRAMUSCULAR; INTRAVENOUS at 05:43

## 2025-03-20 RX ADMIN — Medication 1 APPLICATION(S): at 11:22

## 2025-03-20 RX ADMIN — Medication 650 MILLIGRAM(S): at 23:14

## 2025-03-20 RX ADMIN — HEPARIN SODIUM 850 UNIT(S)/HR: 1000 INJECTION INTRAVENOUS; SUBCUTANEOUS at 19:30

## 2025-03-20 RX ADMIN — INSULIN GLARGINE-YFGN 13 UNIT(S): 100 INJECTION, SOLUTION SUBCUTANEOUS at 22:16

## 2025-03-20 NOTE — H&P ADULT - ASSESSMENT
73 y/o female with PMH of HTN, HLD, CAD s/p PCI, HFrEF, Mobitz II s/p PPM (MD NereydaT), PAD with chronic right LE wound, CVA, DM2, CKD3 came to the ED complaining of chest pressure. Found to have acute on chronic CHF and NSTEMI in ED.       NSTEMI   Admit to telemetry   Troponin:: 906  GUERO: v-paced   Heparin protocol started as per protocol; monitor aPTT as per protocol   Aspirin loaded in the ED, continue 81mg   Cardiology on board, likely cardiac cath in AM   Trend cardiac enzymes   Continue Statin, Ranolazine 500mg bid     Acute on chronic CHFrEF   Pro-BNP: 32853  Lasix 40mg bid, monitor renal function   Daily weight   Farxiga 10mg   Oxygen therapy as needed   Will hold Entresto for now given worsening renal function while on Lasix. Resume as needed     BRADY on CKD-3   Cr. 2.08 (baseline 1.4-1.8)   Likely due to diuretic use   Monitor renal function     HTN/HLD/CAD   Aspirin 81mg   Plavix 75mg   Atorvastatin 80mg   Metoprolol ER 100mg   Ranolazine ER 500mg bid   Imdur 60mg     DM-2   Lantus 26 units HS, will give half while NPO   Insulin sliding scale     Anemia   Likely of chronic disease   Hb stable   Iron tab  Monitor CBC    Supportive   DVT prophylaxis: Heparin drip  Diet: NPO except medications     Plan of care discussed with patient using , granddaughter at bedside and ED nurse     Dispo: when stable, home

## 2025-03-20 NOTE — H&P ADULT - CONVERSATION DETAILS
Advance care directive discussed with patient and granddaughter at bedside. As per granddaughter, patient's  and son (Balaji) are the health care proxy. Family has discussed this topic in the past and wants everything done for patient, there is no restriction on medical management. Patient is FULL CODE.

## 2025-03-20 NOTE — H&P ADULT - TIME BILLING
chart, labs and imaging reviewed. Physical examination, medication reconciliation and documentation. Discussion with patient, granddaughter and ED nurse

## 2025-03-20 NOTE — CHART NOTE - NSCHARTNOTEFT_GEN_A_CORE
Pt is a  73yo F admitted to [facility] for NSTIMI  Pt was seen and examined with  at bedside via language line solution  Sherrie ID [id number].   Pt still have substernal chest pain, denies of any SOB  Case discussed with cardiology  NPO after midnight pending cardiac cath tomorrow    Rest of care agree with H&P

## 2025-03-20 NOTE — PATIENT PROFILE ADULT - FALL HARM RISK - HARM RISK INTERVENTIONS

## 2025-03-20 NOTE — H&P ADULT - HISTORY OF PRESENT ILLNESS
75 y/o female with PMH of HTN, HLD, CAD s/p PCI, HFrEF, Mobitz II s/p PPM (Nereyda, MDT), PAD with chronic right LE wound, CVA, DM2, CKD3 came to the ED complaining of chest pressure. Patient reported midsternal pressure, non-radiating going for couple of days. As per granddaughter (Lexi) at bedside, patient has been complaining of cough (dry), fever and body aches x 3 days. Lexi also said she got a call from cardiology saying to increase her to Lasix 40mg to tid (from bid) because her CardioMems was not at goal; which she has been doing for the past 2 days. She went to see her PCP yesterday for the fever and body ache, noted to have weight gain, PCP spoke to cardiology who advise to come to the ED. Patient has no shortness of breath, palpitation, nausea, vomiting, abdominal pain, change in bowel/urinary habit, recent travel, sick contact.

## 2025-03-21 ENCOUNTER — RESULT REVIEW (OUTPATIENT)
Age: 75
End: 2025-03-21

## 2025-03-21 LAB
ANION GAP SERPL CALC-SCNC: 14 MMOL/L — SIGNIFICANT CHANGE UP (ref 5–17)
APTT BLD: 61.4 SEC — HIGH (ref 24.5–35.6)
BLD GP AB SCN SERPL QL: SIGNIFICANT CHANGE UP
BUN SERPL-MCNC: 41.1 MG/DL — HIGH (ref 8–20)
CALCIUM SERPL-MCNC: 7.3 MG/DL — LOW (ref 8.4–10.5)
CHLORIDE SERPL-SCNC: 104 MMOL/L — SIGNIFICANT CHANGE UP (ref 96–108)
CO2 SERPL-SCNC: 21 MMOL/L — LOW (ref 22–29)
CREAT SERPL-MCNC: 1.97 MG/DL — HIGH (ref 0.5–1.3)
EGFR: 27 ML/MIN/1.73M2 — LOW
EGFR: 27 ML/MIN/1.73M2 — LOW
GLUCOSE BLDC GLUCOMTR-MCNC: 123 MG/DL — HIGH (ref 70–99)
GLUCOSE BLDC GLUCOMTR-MCNC: 195 MG/DL — HIGH (ref 70–99)
GLUCOSE BLDC GLUCOMTR-MCNC: 229 MG/DL — HIGH (ref 70–99)
GLUCOSE BLDC GLUCOMTR-MCNC: 233 MG/DL — HIGH (ref 70–99)
GLUCOSE BLDC GLUCOMTR-MCNC: 94 MG/DL — SIGNIFICANT CHANGE UP (ref 70–99)
GLUCOSE SERPL-MCNC: 195 MG/DL — HIGH (ref 70–99)
HCT VFR BLD CALC: 30.9 % — LOW (ref 34.5–45)
HGB BLD-MCNC: 10.2 G/DL — LOW (ref 11.5–15.5)
MCHC RBC-ENTMCNC: 28.7 PG — SIGNIFICANT CHANGE UP (ref 27–34)
MCHC RBC-ENTMCNC: 33 G/DL — SIGNIFICANT CHANGE UP (ref 32–36)
MCV RBC AUTO: 86.8 FL — SIGNIFICANT CHANGE UP (ref 80–100)
NRBC # BLD AUTO: 0 K/UL — SIGNIFICANT CHANGE UP (ref 0–0)
NRBC # FLD: 0 K/UL — SIGNIFICANT CHANGE UP (ref 0–0)
NRBC BLD AUTO-RTO: 0 /100 WBCS — SIGNIFICANT CHANGE UP (ref 0–0)
PA ADP PRP-ACNC: 108 PRU — LOW (ref 182–335)
PLATELET # BLD AUTO: 331 K/UL — SIGNIFICANT CHANGE UP (ref 150–400)
PMV BLD: 11.2 FL — SIGNIFICANT CHANGE UP (ref 7–13)
POTASSIUM SERPL-MCNC: 4 MMOL/L — SIGNIFICANT CHANGE UP (ref 3.5–5.3)
POTASSIUM SERPL-SCNC: 4 MMOL/L — SIGNIFICANT CHANGE UP (ref 3.5–5.3)
RBC # BLD: 3.56 M/UL — LOW (ref 3.8–5.2)
RBC # FLD: 14.8 % — HIGH (ref 10.3–14.5)
SODIUM SERPL-SCNC: 139 MMOL/L — SIGNIFICANT CHANGE UP (ref 135–145)
WBC # BLD: 8.56 K/UL — SIGNIFICANT CHANGE UP (ref 3.8–10.5)
WBC # FLD AUTO: 8.56 K/UL — SIGNIFICANT CHANGE UP (ref 3.8–10.5)

## 2025-03-21 PROCEDURE — 99232 SBSQ HOSP IP/OBS MODERATE 35: CPT

## 2025-03-21 PROCEDURE — 99232 SBSQ HOSP IP/OBS MODERATE 35: CPT | Mod: 57

## 2025-03-21 PROCEDURE — 93306 TTE W/DOPPLER COMPLETE: CPT | Mod: 26

## 2025-03-21 PROCEDURE — 92978 ENDOLUMINL IVUS OCT C 1ST: CPT | Mod: 26,RC

## 2025-03-21 PROCEDURE — 93010 ELECTROCARDIOGRAM REPORT: CPT

## 2025-03-21 PROCEDURE — 99152 MOD SED SAME PHYS/QHP 5/>YRS: CPT

## 2025-03-21 PROCEDURE — 93458 L HRT ARTERY/VENTRICLE ANGIO: CPT | Mod: 26,59

## 2025-03-21 PROCEDURE — 92941 PRQ TRLML REVSC TOT OCCL AMI: CPT | Mod: RC

## 2025-03-21 RX ORDER — TICAGRELOR 90 MG/1
90 TABLET ORAL
Refills: 0 | Status: DISCONTINUED | OUTPATIENT
Start: 2025-03-22 | End: 2025-03-31

## 2025-03-21 RX ORDER — INSULIN GLARGINE-YFGN 100 [IU]/ML
26 INJECTION, SOLUTION SUBCUTANEOUS AT BEDTIME
Refills: 0 | Status: DISCONTINUED | OUTPATIENT
Start: 2025-03-21 | End: 2025-03-31

## 2025-03-21 RX ORDER — FUROSEMIDE 10 MG/ML
40 INJECTION INTRAMUSCULAR; INTRAVENOUS
Refills: 0 | Status: DISCONTINUED | OUTPATIENT
Start: 2025-03-21 | End: 2025-03-23

## 2025-03-21 RX ORDER — FUROSEMIDE 10 MG/ML
40 INJECTION INTRAMUSCULAR; INTRAVENOUS ONCE
Refills: 0 | Status: COMPLETED | OUTPATIENT
Start: 2025-03-21 | End: 2025-03-21

## 2025-03-21 RX ADMIN — FUROSEMIDE 40 MILLIGRAM(S): 10 INJECTION INTRAMUSCULAR; INTRAVENOUS at 14:38

## 2025-03-21 RX ADMIN — Medication 650 MILLIGRAM(S): at 20:50

## 2025-03-21 RX ADMIN — INSULIN LISPRO 4: 100 INJECTION, SOLUTION INTRAVENOUS; SUBCUTANEOUS at 05:00

## 2025-03-21 RX ADMIN — INSULIN LISPRO 2: 100 INJECTION, SOLUTION INTRAVENOUS; SUBCUTANEOUS at 21:12

## 2025-03-21 RX ADMIN — INSULIN GLARGINE-YFGN 26 UNIT(S): 100 INJECTION, SOLUTION SUBCUTANEOUS at 21:37

## 2025-03-21 RX ADMIN — INSULIN LISPRO 4: 100 INJECTION, SOLUTION INTRAVENOUS; SUBCUTANEOUS at 01:17

## 2025-03-21 RX ADMIN — SODIUM CHLORIDE 50 MILLILITER(S): 9 INJECTION, SOLUTION INTRAVENOUS at 00:19

## 2025-03-21 RX ADMIN — METOPROLOL SUCCINATE 100 MILLIGRAM(S): 50 TABLET, EXTENDED RELEASE ORAL at 04:55

## 2025-03-21 RX ADMIN — ATORVASTATIN CALCIUM 80 MILLIGRAM(S): 80 TABLET, FILM COATED ORAL at 19:50

## 2025-03-21 RX ADMIN — Medication 81 MILLIGRAM(S): at 06:06

## 2025-03-21 RX ADMIN — RANOLAZINE 500 MILLIGRAM(S): 1000 TABLET, FILM COATED, EXTENDED RELEASE ORAL at 04:55

## 2025-03-21 RX ADMIN — ISOSORBIDE MONONITRATE 60 MILLIGRAM(S): 60 TABLET, EXTENDED RELEASE ORAL at 14:38

## 2025-03-21 RX ADMIN — GABAPENTIN 400 MILLIGRAM(S): 400 CAPSULE ORAL at 19:50

## 2025-03-21 RX ADMIN — Medication 250 MILLILITER(S): at 09:06

## 2025-03-21 RX ADMIN — Medication 650 MILLIGRAM(S): at 19:50

## 2025-03-21 NOTE — PROGRESS NOTE ADULT - ASSESSMENT
74F with PMHx HTN, CAD S/P PCI x3 (01/13/2025), HFrEF, CardioMEMS, heart block S/P micra PPM, PAD, CVA, DM, CKD. Patient was sent to [facility] ED from cardiology office with complaints of SOB and lower extremity swelling x3 days. Patient endorses fever and cough. Swelling was not improving despite increasing PO doses of Lasix at home. CardioMEMS readings were at 24, goal is 14, and patient has also noted weight gain. Patient states she has been experiencing exertional chest pain that improves with rest. Denies headaches, dizziness, palpitations, nausea, or diaphoresis. Denies recent sick contacts or recent travel. Cardiology was consulted for HF exacerbation and patient follows with Dr. Allen. On presentation, initial troponin was 906.

## 2025-03-21 NOTE — PROGRESS NOTE ADULT - ASSESSMENT
73 y/o female with PMH of HTN, HLD, CAD s/p PCI, HFrEF, Mobitz II s/p PPM (MD NereydaT), PAD with chronic right LE wound, CVA, DM2, CKD3 came to the ED complaining of chest pressure. Found to have acute on chronic CHF and NSTEMI in ED.       *NSTEMI   Troponin:: 906  GUERO: v-paced   s/p heparin   Aspirin loaded in the ED  continue 81mg   Cont plavix 75   STarted on Brilinta   s/p cardiac cath this am  official report pending   Continue Statin, Ranolazine 500mg bid   cont Toprol 100mg   hold Farxiga and Entresto for now   cont Lipitor to 80qhs   Lasix 40mg BID   f/u dr. Bajwa in 1-2 weeks     *Acute on chronic CHFrEF   Pro-BNP: 90701  Lasix 40mg bid, monitor renal function   Daily weight   Farxiga 10mg on hold   Entresto on hold due to BRADY   Oxygen therapy as needed     *BRADY on CKD-3   baseline 1.4-1.8  secondary to fluid overload    Monitor renal function   Cont lasix 40mg BID     HTN/HLD/CAD   Aspirin 81mg   Plavix 75mg   Atorvastatin 80mg   Metoprolol ER 100mg   Ranolazine ER 500mg bid   Imdur 60mg     DM-2   Lantus 26 units HS, s/p half while NPO   resume lantus 26u qhs   Insulin sliding scale     *Anemia   Likely of chronic disease   Hb stable   Iron tab  Monitor CBC         Plan of care discussed with patient using      Dispo: 24-48hr

## 2025-03-21 NOTE — PROGRESS NOTE ADULT - SUBJECTIVE AND OBJECTIVE BOX
Interventional Cardiology NP Precath Note      HPI: 74F with PMHx HTN, CAD S/P PCI x3 (01/13/2025), HFrEF, CardioMEMS, heart block S/P micra PPM, PAD, CVA, DM, CKD. Patient was sent to [facility] ED from cardiology office with complaints of SOB and lower extremity swelling x3 days. Patient endorses fever and cough. Swelling was not improving despite increasing PO doses of Lasix at home. CardioMEMS readings were at 24, goal is 14, and patient has also noted weight gain. Patient states she has been experiencing exertional chest pain that improves with rest. Denies headaches, dizziness, palpitations, nausea, or diaphoresis. Denies recent sick contacts or recent travel. Cardiology was consulted for HF exacerbation and patient follows with Dr. Allen. On presentation, initial troponin was 906.        ECHO: < from: TTE W or WO Ultrasound Enhancing Agent (12.05.24 @ 21:37) >  CONCLUSIONS:      1. Technically difficult image quality.   2. Left ventricular cavity is normal in size. Left ventricular systolic function is moderately decreased with an ejection fraction of 39 % by Taylor's method of disks with an ejection fraction visually estimated at 35 to 40 %.   3. Multiple segmental abnormalities exist. See findings.   4. Unable to assess left ventricular diastolic function due to insufficient data.   5. Normal right ventricular cavity size and normal right ventricular systolic function.   6. Left atrium is normal in size.   7. Mild to moderate mitral regurgitation.   8. Mild tricuspid regurgitation.   9. Estimated pulmonary artery systolic pressure is 38 mmHg, consistent with mild pulmonary hypertension.  10. Trileaflet aortic valve. Fibrocalcific aortic valve sclerosis without stenosis.  11. Trace aortic regurgitation.  12. No pericardial effusion seen.  13. Compared to the transthoracic echocardiogram performed on 10/8/2024, there is further decline in left ventricular function, from 45-50% to 35-40%.    < end of copied text >          ALL: NKDA, NKFA. Denies shellfish/Contrast dye allergy.  PAST MEDICAL & SURGICAL HISTORY:  DM (diabetes mellitus)      HTN (hypertension)      H/O gastroesophageal reflux (GERD)      Cardiac pacemaker  MICRA for mobitz type 11      CVA (cerebrovascular accident)  resdiual right sided weakness      CVA (cerebrovascular accident)      PAD (peripheral artery disease)      Wound of right leg      History of benign brain tumor      Cataract      History of biopsy      Cardiac pacemaker      S/P angiogram of extremity        SocHX: Denies EtoH/TOB/IVDU    MEDS:   acetaminophen     Tablet .. 650 milliGRAM(s) Oral every 6 hours PRN  aluminum hydroxide/magnesium hydroxide/simethicone Suspension 30 milliLiter(s) Oral every 4 hours PRN  aspirin enteric coated 81 milliGRAM(s) Oral daily  atorvastatin 80 milliGRAM(s) Oral at bedtime  chlorhexidine 2% Cloths 1 Application(s) Topical daily  clopidogrel Tablet 75 milliGRAM(s) Oral daily  dextrose 5% + sodium chloride 0.9%. 1000 milliLiter(s) IV Continuous <Continuous>  dextrose 5%. 1000 milliLiter(s) IV Continuous <Continuous>  dextrose 5%. 1000 milliLiter(s) IV Continuous <Continuous>  dextrose 50% Injectable 25 Gram(s) IV Push once  dextrose 50% Injectable 12.5 Gram(s) IV Push once  dextrose 50% Injectable 25 Gram(s) IV Push once  dextrose Oral Gel 15 Gram(s) Oral once PRN  ferrous    sulfate 325 milliGRAM(s) Oral daily  gabapentin 400 milliGRAM(s) Oral at bedtime  glucagon  Injectable 1 milliGRAM(s) IntraMuscular once  heparin   Injectable 4400 Unit(s) IV Push every 6 hours PRN  heparin  Infusion.  Unit(s)/Hr IV Continuous <Continuous>  insulin glargine Injectable (LANTUS) 13 Unit(s) SubCutaneous at bedtime  insulin lispro (ADMELOG) corrective regimen sliding scale   SubCutaneous every 6 hours  isosorbide   mononitrate ER Tablet (IMDUR) 60 milliGRAM(s) Oral daily  melatonin 3 milliGRAM(s) Oral at bedtime PRN  metoprolol succinate  milliGRAM(s) Oral daily  ondansetron Injectable 4 milliGRAM(s) IV Push every 8 hours PRN  ranolazine 500 milliGRAM(s) Oral two times a day    T(C): 36.8 (03-21-25 @ 00:27), Max: 36.8 (03-20-25 @ 16:04)  HR: 65 (03-21-25 @ 00:27) (62 - 66)  BP: 149/74 (03-21-25 @ 00:27) (112/69 - 149/74)  RR: 18 (03-21-25 @ 00:27) (16 - 18)  SpO2: 95% (03-21-25 @ 00:27) (94% - 97%)  Wt(kg): --                              10.2   8.56  )-----------( 331      ( 21 Mar 2025 04:48 )             30.9     03-21    139  |  104  |  41.1[H]  ----------------------------<  195[H]  4.0   |  21.0[L]  |  1.97[H]    Ca    7.3[L]      21 Mar 2025 04:48  Mg     2.7     03-19    TPro  7.1  /  Alb  3.7  /  TBili  0.9  /  DBili  x   /  AST  27  /  ALT  19  /  AlkPhos  127[H]  03-19        					  ASA ____3_				Mallampati class: _______2__	        BRA: ______5.2___    A/P: 74F with PMHx HTN, CAD S/P PCI x3 (01/13/2025), HFrEF, CardioMEMS, heart block S/P micra PPM, PAD, CVA, DM, CKD. Patient was sent to [facility] ED from cardiology office with complaints of SOB and lower extremity swelling x3 days. Patient endorses fever and cough. Swelling was not improving despite increasing PO doses of Lasix at home. CardioMEMS readings were at 24, goal is 14, and patient has also noted weight gain. Patient states she has been experiencing exertional chest pain that improves with rest. Denies headaches, dizziness, palpitations, nausea, or diaphoresis. Denies recent sick contacts or recent travel. Cardiology was consulted for HF exacerbation and patient follows with Dr. Allen. On presentation, initial troponin was 906.  -Proceed with [facility] as planned  -Hold heparin gtt on all to cath lab  -Aspirin 81mg po x 1now  -Hydration deferred due to a/w HF exacerbation  -Consent obtained by IC   -Procedure discussed at length via  and patient verbalizes understanding           Risks & benefits of procedure and sedation and risks and benefits for the alternative therapy have been explained to the patient in layman’s terms including but not limited to: allergic reaction, bleeding, infection, arrhythmia, respiratory compromise, renal and vascular compromise, limb damage, MI, CVA, emergent CABG/Vascular Surgery and death. Informed consent obtained and in chart.

## 2025-03-21 NOTE — PROGRESS NOTE ADULT - NS ATTEND AMEND GEN_ALL_CORE FT
I have seen and examined the patient and agree with the assessment and plan. NSTEMI, here for [facility].

## 2025-03-21 NOTE — PROGRESS NOTE ADULT - SUBJECTIVE AND OBJECTIVE BOX
HPI  Pt is a 75yo F Algerian speaker admitted to [facility] for chest pain.   Pt was seen and examined at bedside. S/p Cardiac cath this am.     Vital Signs Last 24 Hrs  T(C): 36.9 (21 Mar 2025 07:08), Max: 36.9 (21 Mar 2025 07:08)  T(F): 98.4 (21 Mar 2025 07:08), Max: 98.4 (21 Mar 2025 07:08)  HR: 64 (21 Mar 2025 10:20) (62 - 75)  BP: 148/73 (21 Mar 2025 10:20) (112/69 - 149/74)  BP(mean): 96 (20 Mar 2025 21:24) (92 - 96)  RR: 18 (21 Mar 2025 10:20) (15 - 20)  SpO2: 100% (21 Mar 2025 10:20) (94% - 100%)    Parameters below as of 21 Mar 2025 10:20  Patient On (Oxygen Delivery Method): room air        I&O's Summary    20 Mar 2025 07:01  -  21 Mar 2025 07:00  --------------------------------------------------------  IN: 523.5 mL / OUT: 1600 mL / NET: -1076.5 mL        CAPILLARY BLOOD GLUCOSE      POCT Blood Glucose.: 94 mg/dL (21 Mar 2025 09:57)  POCT Blood Glucose.: 229 mg/dL (21 Mar 2025 04:59)  POCT Blood Glucose.: 233 mg/dL (21 Mar 2025 01:16)  POCT Blood Glucose.: 208 mg/dL (20 Mar 2025 22:12)  POCT Blood Glucose.: 151 mg/dL (20 Mar 2025 17:37)  POCT Blood Glucose.: 118 mg/dL (20 Mar 2025 11:21)      PHYSICAL EXAM:    Constitutional: NAD,   HEENT: PERR, EOMI,    Neck: Soft and supple,    Respiratory: Breath sounds are clear bilaterally,   Cardiovascular: S1 and S2,   Gastrointestinal: Bowel Sounds present, soft, nontender,   Extremities: No peripheral edema  Vascular: 2+ peripheral pulses  Neurological: A/O x 3,     MEDICATIONS:  MEDICATIONS  (STANDING):  aspirin enteric coated 81 milliGRAM(s) Oral daily  atorvastatin 80 milliGRAM(s) Oral at bedtime  chlorhexidine 2% Cloths 1 Application(s) Topical daily  clopidogrel Tablet 75 milliGRAM(s) Oral daily  dextrose 5%. 1000 milliLiter(s) (100 mL/Hr) IV Continuous <Continuous>  dextrose 5%. 1000 milliLiter(s) (50 mL/Hr) IV Continuous <Continuous>  dextrose 50% Injectable 25 Gram(s) IV Push once  dextrose 50% Injectable 12.5 Gram(s) IV Push once  dextrose 50% Injectable 25 Gram(s) IV Push once  ferrous    sulfate 325 milliGRAM(s) Oral daily  gabapentin 400 milliGRAM(s) Oral at bedtime  glucagon  Injectable 1 milliGRAM(s) IntraMuscular once  heparin  Infusion.  Unit(s)/Hr (9 mL/Hr) IV Continuous <Continuous>  insulin glargine Injectable (LANTUS) 13 Unit(s) SubCutaneous at bedtime  insulin lispro (ADMELOG) corrective regimen sliding scale   SubCutaneous every 6 hours  isosorbide   mononitrate ER Tablet (IMDUR) 60 milliGRAM(s) Oral daily  metoprolol succinate  milliGRAM(s) Oral daily  ranolazine 500 milliGRAM(s) Oral two times a day  sodium chloride 0.9% Bolus 250 milliLiter(s) IV Bolus once      LABS: All Labs Reviewed:                        10.2   8.56  )-----------( 331      ( 21 Mar 2025 04:48 )             30.9     03-21    139  |  104  |  41.1[H]  ----------------------------<  195[H]  4.0   |  21.0[L]  |  1.97[H]    Ca    7.3[L]      21 Mar 2025 04:48  Mg     2.7     03-19    TPro  7.1  /  Alb  3.7  /  TBili  0.9  /  DBili  x   /  AST  27  /  ALT  19  /  AlkPhos  127[H]  03-19    PT/INR - ( 19 Mar 2025 22:08 )   PT: 13.7 sec;   INR: 1.18 ratio         PTT - ( 21 Mar 2025 04:48 )  PTT:61.4 sec      Blood Culture:     RADIOLOGY/EKG:    DVT PPX:    ADVANCED DIRECTIVE:    DISPOSITION:

## 2025-03-21 NOTE — PROGRESS NOTE ADULT - NS ATTEND AMEND GEN_ALL_CORE FT
74F with PMHx HTN, CAD S/P PCI x3 (01/13/2025), HFrEF, CardioMEMS, heart block S/P micra PPM, PAD, CVA, DM, CKD. Patient was sent to [facility] ED from cardiology office with complaints of SOB and lower extremity swelling x3 days.    Non-ST elevation MI (NSTEMI)/ CAD s/p PCI x3 (01/13/2025) -   EKG ventricular paced at 62, unchanged from previous  - Patient endorsing exertional chest pain that improves with rest  - Troponin 906; trend q6h until peak  - Cardiac cath on 01/13/2025 - RCA: Prox 70%, mid 80%, distal 90% stenosis; S/P Rotational atherectomy with RotaPro, IVUS (severe calcium) and PCI with 3 ZEINAB and then 1/22/2025: LAD and LCX appear the same as last time. Culprit was 99% distal RCA instent restenosis. S/p PCI        - s/p LHC x 1 ZEINAB to RCA ISR. switch to brilinta. continue ASA  - TTE from 12/2024 with EF of 39% with multiple segment abnormalities, mild-mod MR, and mild TR  - continue atorvastatin 80 mg daily, Ranolazine 500 mg BID, Imdur 60 mg daily  - Continuous telemetry monitoring.    Acute on Chronic HFrEF LVEF 39 % - LVEDP 17, will maintain on Torsemide 10mg po q daily and continue with Toprol XL/ if blood pressure is elevated consider Hydralazine 10mg po BID     Becky Pan D.O. [facility]  Cardiology/Vascular Cardiology -[facility] Cardiology   Telephone # [phone]

## 2025-03-21 NOTE — CHART NOTE - NSCHARTNOTEFT_GEN_A_CORE
Now s/p LHC via RFA with sheath in place. Procedure performed by Dr. Bajwa.  Pt arrived to recovery in NAD and HDS. RFA access site stable, no bleed/hematoma, distal pulse +.    Procedure results:  SHELLEY FRONTIER 4.0x18mm to m/d RCA    Medication received during procedure:  Versed: 1mg  Fentanyl: 25mcg  Lidocaine: 5ml  Heparin: 8,000 units  Brilinta: 180mg  ASA: 81mg  Omnipaque: 58ml    Exam:   Neuro: A&O X4  CV: paced   Lungs: CTA  Ext: + palp pulses.  Vascular access: RFA.  Site stable. No bleeding/hematoma/ecchymosis.  + cap refill     Plan:  -Formal cath report pending  -Post procedure management/monitoring per protocol  -Access site precautions  -Bedrest 2 hours post sheath removal  -Labs and EKG in am  -Repeat ECG if any clinical indication or change on tele  -NS 250mL bolus post cath held in the setting of fluid overload  -Dual anti platelet therapy with aspirin/  -Cont BB with Toprol 100mg po daily  -Hold Farxiga and Entresto in the setting of elevated Cr   -Cont statin therapy with Lipitor 80mg po qHS   -Cont diuresis with Furosemide 40mg po BID  -Educated regarding strict adherence with DAPT   -Educated regarding post procedure management and care  -Discussed the importance of RF modification  -Cardiac rehab info provided/referral and communication to cardiac rehab completed  -F/U outpt in 1-2 weeks with Cardiologist Dr. Bajwa  -DISPO: Plan for D/C per Cardiology re heart failure optimization Now s/p LHC via RFA with sheath in place. Procedure performed by Dr. Bajwa.  Pt arrived to recovery in NAD and HDS. RFA access site stable, no bleed/hematoma, distal pulse +.    Procedure results:  SHELLEY FRONTIER 4.0x18mm to m/d RCA    post cath EKG: ventricular paced, 65bpm    Medication received during procedure:  Versed: 1mg  Fentanyl: 25mcg  Lidocaine: 5ml  Heparin: 8,000 units  Brilinta: 180mg  ASA: 81mg  Omnipaque: 58ml    Exam:   Neuro: A&O X4  CV: paced   Lungs: CTA  Ext: + palp pulses.  Vascular access: RFA.  Site stable. No bleeding/hematoma/ecchymosis.  + cap refill     Plan:  -Formal cath report pending  -Post procedure management/monitoring per protocol  -Access site precautions  -Bedrest 2 hours post sheath removal  -Labs and EKG in am  -Repeat ECG if any clinical indication or change on tele  -NS 250mL bolus given for SALO ppx, EDP 15 per Dr. Bajwa  -Dual anti platelet therapy with aspirin 81mg po daily/BRILINTA 90mg BID. Patient previously on Plavix 75mg po daily, ISR in RCA from 1/2025. P2Y12 level sent to lab. Transition patient to Brilinta 90mg po BID  -Cont BB with Toprol 100mg po daily  -Hold Farxiga and Entresto in the setting of elevated Cr   -Cont statin therapy with Lipitor 80mg po qHS   -Cont Ranexa 500mg po BID  -Hold Lasix 40mg BID IVP today, EDP 15-16. Ok to resume tomorrow, transition to PO. Discussed with Clinical Cardiology   -Ordered TTE to re-evaluate heart function, valvulopathy. Last TTE previous admission   -Educated regarding strict adherence with DAPT. Discussed with patient's granddaughter and son to ensure compliance.   -Educated regarding post procedure management and care  -Discussed the importance of RF modification  -Cardiac rehab info provided/referral and communication to cardiac rehab completed  -F/U outpt in 1-2 weeks with Cardiologist Dr. Bajwa  -DISPO: Plan for D/C per Cardiology re heart failure optimization Now s/p LHC via RFA with sheath in place. Procedure performed by Dr. Bajwa.  Pt arrived to recovery in NAD and HDS. RFA access site stable, no bleed/hematoma, distal pulse +.    Procedure results:  SHELLEY FRONTIER 4.0x18mm to m/d RCA  Procedures Performed   1.    Ultrasound Guided Access   2.    Arterial Access - Right Femoral   3.    Right Common Femoral Angiography   4.    Left Heart Cath   5.    Diagnostic Coronary Angiography   6.    IVUS   7.    PCI: ZEINAB   8.    PCI: POBA   Indications: CCS Class II   Myocardial infarction without ST elevation (NSTEMI)   Conclusions:   Syntax Score: Intermediate   LM: Luminal irregularities    LAD: Severe diffuse stenosis from mid to distal    LCx: Severe stenosis in OM1 and OM2 (small vessels)    RCA: mid ISR 50%, distal ISR 99% stenosis. S/p IVUS (stent  underexpanded and severe stenosis) and PCI with 1 ZEINAB. POBA  mid RCA   LV EDP 17   Recommendations:   Loaded with Brilinta 180mg. C/w Brilinta and ASA 81mg    Acute complication:    No complications       post cath EKG: ventricular paced, 65bpm    Medication received during procedure:  Versed: 1mg  Fentanyl: 25mcg  Lidocaine: 5ml  Heparin: 8,000 units  Brilinta: 180mg  ASA: 81mg  Omnipaque: 58ml    Exam:   Neuro: A&O X4  CV: paced   Lungs: CTA  Ext: + palp pulses.  Vascular access: RFA.  Site stable. No bleeding/hematoma/ecchymosis.  + cap refill     Plan:  -Post procedure management/monitoring per protocol  -Access site precautions  -Bedrest 2 hours post sheath removal  -Labs and EKG in am  -Repeat ECG if any clinical indication or change on tele  -NS 250mL bolus given for SALO ppx, EDP 15 per Dr. Bajwa  -Dual anti platelet therapy with aspirin 81mg po daily/BRILINTA 90mg BID. Patient previously on Plavix 75mg po daily, ISR in RCA from 1/2025. P2Y12 level sent to lab. Transition patient to Brilinta 90mg po BID  -Cont BB with Toprol 100mg po daily  -Hold Farxiga and Entresto in the setting of BRADY  -Cont statin therapy with Lipitor 80mg po qHS   -Cont Ranexa 500mg po BID  -Hold Lasix 40mg BID IVP today, EDP 15-16. Ok to resume tomorrow, transition to PO. Discussed with Clinical Cardiology   -Ordered TTE to re-evaluate heart function, valvulopathy. Last TTE previous admission   -Educated regarding strict adherence with DAPT. Discussed with patient's granddaughter and son to ensure compliance.   -Educated regarding post procedure management and care  -Discussed the importance of RF modification  -Cardiac rehab info provided/referral and communication to cardiac rehab completed  -F/U outpt in 1-2 weeks with Cardiologist Dr. Bajwa  -DISPO: Plan for D/C per Cardiology/primary team re heart failure optimization Now s/p LHC via RFA with sheath in place. Procedure performed by Dr. Bajwa.  Pt arrived to recovery in NAD and HDS. RFA access site stable, no bleed/hematoma, distal pulse +.    Procedure results:  SHELLEY FRONTIER 4.0x18mm to m/d RCA  Procedures Performed   1.    Ultrasound Guided Access   2.    Arterial Access - Right Femoral   3.    Right Common Femoral Angiography   4.    Left Heart Cath   5.    Diagnostic Coronary Angiography   6.    IVUS   7.    PCI: ZEINAB   8.    PCI: POBA   Indications: CCS Class II   Myocardial infarction without ST elevation (NSTEMI)   Conclusions:   Syntax Score: Intermediate   LM: Luminal irregularities    LAD: Severe diffuse stenosis from mid to distal    LCx: Severe stenosis in OM1 and OM2 (small vessels)    RCA: mid ISR 50%, distal ISR 99% stenosis. S/p IVUS (stent  underexpanded and severe stenosis) and PCI with 1 ZEINAB. POBA  mid RCA   LV EDP 17   Recommendations:   Loaded with Brilinta 180mg. C/w Brilinta and ASA 81mg    Acute complication:    No complications       post cath EKG: ventricular paced, 65bpm    Medication received during procedure:  Versed: 1mg  Fentanyl: 25mcg  Lidocaine: 5ml  Heparin: 8,000 units  Brilinta: 180mg  ASA: 81mg  Omnipaque: 58ml    Exam:   Neuro: A&O X4  CV: paced   Lungs: CTA  Ext: + palp pulses.  Vascular access: RFA.  Site stable. No bleeding/hematoma/ecchymosis.  + cap refill     Plan:  -Post procedure management/monitoring per protocol  -Access site precautions  -Bedrest 2 hours post sheath removal  -Labs and EKG in am  -Repeat ECG if any clinical indication or change on tele  -NS 250mL bolus given for SALO ppx (patient Cr 1.97), EDP 17 per Dr. Bajwa  -Dual anti platelet therapy with aspirin 81mg po daily/BRILINTA 90mg BID. Patient previously on Plavix 75mg po daily, ISR in RCA from 1/2025. P2Y12 level sent to lab. Transition patient to Brilinta 90mg po BID  -Cont BB with Toprol 100mg po daily  -Hold Farxiga, patient to resume Entresto tomorrow AM  -Cont statin therapy with Lipitor 80mg po qHS   -Cont Ranexa 500mg po BID  -Cardiomems done at bedside. Current readings 22, goal is 14. Patient to receive Lasix 40mg IVP stat, and to resume 40mg IVP BID. Discussed with HF and clinical cards   -Ordered TTE to re-evaluate heart function, valvulopathy. Last TTE previous admission   -Educated regarding strict adherence with DAPT. Discussed with patient's granddaughter and son to ensure compliance.   -Educated regarding post procedure management and care  -Discussed the importance of RF modification  -Cardiac rehab info provided/referral and communication to cardiac rehab completed  -F/U outpt in 1-2 weeks with Cardiologist Dr. Bajwa  -DISPO: Plan for D/C per Cardiology/primary team re heart failure optimization Now s/p LHC via RFA with sheath in place. Procedure performed by Dr. Arce.  Pt arrived to recovery in NAD and HDS. RFA access site stable, no bleed/hematoma, distal pulse +.    Procedure results:  SHELLEY FRONTIER 4.0x18mm to m/d RCA  Procedures Performed   1.    Ultrasound Guided Access   2.    Arterial Access - Right Femoral   3.    Right Common Femoral Angiography   4.    Left Heart Cath   5.    Diagnostic Coronary Angiography   6.    IVUS   7.    PCI: ZEINAB   8.    PCI: POBA   Indications: CCS Class II   Myocardial infarction without ST elevation (NSTEMI)   Conclusions:   Syntax Score: Intermediate   LM: Luminal irregularities    LAD: Severe diffuse stenosis from mid to distal    LCx: Severe stenosis in OM1 and OM2 (small vessels)    RCA: mid ISR 50%, distal ISR 99% stenosis. S/p IVUS (stent  underexpanded and severe stenosis) and PCI with 1 ZEINAB. POBA  mid RCA   LV EDP 17   Recommendations:   Loaded with Brilinta 180mg. C/w Brilinta and ASA 81mg    Acute complication:    No complications       post cath EKG: ventricular paced, 65bpm    Medication received during procedure:  Versed: 1mg  Fentanyl: 25mcg  Lidocaine: 5ml  Heparin: 8,000 units  Brilinta: 180mg  ASA: 81mg  Omnipaque: 58ml    Exam:   Neuro: A&O X4  CV: paced   Lungs: CTA  Ext: + palp pulses.  Vascular access: RFA.  Site stable. No bleeding/hematoma/ecchymosis.  + cap refill     Plan:  -Post procedure management/monitoring per protocol  -Access site precautions  -Bedrest 2 hours post sheath removal  -Labs and EKG in am  -Repeat ECG if any clinical indication or change on tele  -NS 250mL bolus given for SALO ppx (patient Cr 1.97), EDP 17 per Dr. Arce  -Dual anti platelet therapy with aspirin 81mg po daily/BRILINTA 90mg BID. Patient previously on Plavix 75mg po daily, ISR in RCA from 1/2025. P2Y12 level sent to lab. Transition patient to Brilinta 90mg po BID  -Cont BB with Toprol 100mg po daily  -Hold Farxiga, patient to resume Entresto tomorrow AM  -Cont statin therapy with Lipitor 80mg po qHS   -Cont Ranexa 500mg po BID  -Cardiomems done at bedside. Current readings 22, goal is 14. Patient to receive Lasix 40mg IVP stat, and to resume 40mg IVP BID. Discussed with HF and clinical cards   -Ordered TTE to re-evaluate heart function, valvulopathy. Last TTE previous admission   -Educated regarding strict adherence with DAPT. Discussed with patient's granddaughter and son to ensure compliance.   -Educated regarding post procedure management and care  -Discussed the importance of RF modification  -Cardiac rehab info provided/referral and communication to cardiac rehab completed  PLEASE DO NOT ADMINISTER BLOOD TRANSFUSION ON THIS PATIENT WITHIN 72 HOURS OF CARDIAC CATHERIZATION (UNLESS PATIENT IS HEMODYNAMICALLY UNSTABLE OR ACTIVELY BLEEDING) WITHOUT FIRST DISCUSSING WITH INTERVENTIONALIST DR. ARCE ON TEAMS OR CALLING CATH LAB HOLDING -[phone].  -F/U outpt in 1-2 weeks with Cardiologist Dr. Arce  -DISPO: Plan for D/C per Cardiology/primary team re heart failure optimization

## 2025-03-21 NOTE — PROGRESS NOTE ADULT - SUBJECTIVE AND OBJECTIVE BOX
[facility]                                                         CARDIOLOGY AT [place] [address]                                                         Telephone: [phone]. Fax:[phone]                                                                             PROGRESS NOTE      Reason for follow up: s/p [facility]     Review of symptoms:   Cardiac:  No chest pain. No dyspnea. No palpitations.  Respiratory: no cough. No dyspnea  Gastrointestinal: No diarrhea. No abdominal pain. No bleeding.   Neuro: No focal neuro complaints.  All other ROS negative unless otherwise listed above    PHYSICAL EXAM:  Appearance: Comfortable. No acute distress  HEENT:  Atraumatic. Normocephalic.  Normal oral mucosa  Neurologic: A & O x 3, no gross focal deficits.  Cardiovascular: RRR S1 S2, No murmur, no rubs/gallops. No JVD  Respiratory: Lungs clear to auscultation, unlabored   Gastrointestinal:  Soft, Non-tender, + BS  Lower Extremities: 2+ Peripheral Pulses, No clubbing, cyanosis, or edema  Psychiatry: Patient is calm. No agitation.   Skin: warm and dry.    Vitals:  T(C): 36.9 (03-21-25 @ 07:08), Max: 36.9 (03-21-25 @ 07:08)  HR: 58 (03-21-25 @ 12:35) (58 - 77)  BP: 131/77 (03-21-25 @ 12:05) (128/74 - 152/70)  RR: 15 (03-21-25 @ 12:35) (14 - 20)  SpO2: 100% (03-21-25 @ 12:35) (95% - 100%)  Wt(kg): --  I&O's Summary    20 Mar 2025 07:01  -  21 Mar 2025 07:00  --------------------------------------------------------  IN: 523.5 mL / OUT: 1600 mL / NET: -1076.5 mL      Weight (kg): 71 (03-19 @ 19:21)    CURRENT CARDIAC MEDICATIONS:  furosemide   Injectable 40 milliGRAM(s) IV Push once  isosorbide   mononitrate ER Tablet (IMDUR) 60 milliGRAM(s) Oral daily  metoprolol succinate  milliGRAM(s) Oral daily  ranolazine 500 milliGRAM(s) Oral two times a day      CURRENT OTHER MEDICATIONS:  acetaminophen     Tablet .. 650 milliGRAM(s) Oral every 6 hours PRN Temp greater or equal to 38C (100.4F), Mild Pain (1 - 3)  gabapentin 400 milliGRAM(s) Oral at bedtime  melatonin 3 milliGRAM(s) Oral at bedtime PRN Insomnia  ondansetron Injectable 4 milliGRAM(s) IV Push every 8 hours PRN Nausea and/or Vomiting  aluminum hydroxide/magnesium hydroxide/simethicone Suspension 30 milliLiter(s) Oral every 4 hours PRN Dyspepsia  atorvastatin 80 milliGRAM(s) Oral at bedtime  dextrose 50% Injectable 25 Gram(s) IV Push once, Stop order after: 1 Doses  dextrose 50% Injectable 12.5 Gram(s) IV Push once, Stop order after: 1 Doses  dextrose 50% Injectable 25 Gram(s) IV Push once, Stop order after: 1 Doses  dextrose Oral Gel 15 Gram(s) Oral once, Stop order after: 1 Doses PRN Blood Glucose LESS THAN 70 milliGRAM(s)/deciliter  glucagon  Injectable 1 milliGRAM(s) IntraMuscular once, Stop order after: 1 Doses  insulin glargine Injectable (LANTUS) 26 Unit(s) SubCutaneous at bedtime  insulin lispro (ADMELOG) corrective regimen sliding scale   SubCutaneous every 6 hours  aspirin enteric coated 81 milliGRAM(s) Oral daily  chlorhexidine 2% Cloths 1 Application(s) Topical daily  dextrose 5%. 1000 milliLiter(s) (100 mL/Hr) IV Continuous <Continuous>  dextrose 5%. 1000 milliLiter(s) (50 mL/Hr) IV Continuous <Continuous>  ferrous    sulfate 325 milliGRAM(s) Oral daily  heparin   Injectable 4400 Unit(s) IV Push every 6 hours PRN For aPTT less than 40  heparin  Infusion.  Unit(s)/Hr (9 mL/Hr) IV Continuous <Continuous>      LABS:	 	                            10.2   8.56  )-----------( 331      ( 21 Mar 2025 04:48 )             30.9     03-21    139  |  104  |  41.1[H]  ----------------------------<  195[H]  4.0   |  21.0[L]  |  1.97[H]    Ca    7.3[L]      21 Mar 2025 04:48  Mg     2.7     03-19    TPro  7.1  /  Alb  3.7  /  TBili  0.9  /  DBili  x   /  AST  27  /  ALT  19  /  AlkPhos  127[H]  03-19    PT/INR/PTT ( 21 Mar 2025 04:48 )                       :                       :      X            :       61.4                  .        .                   .              .           .       X           .                                       Lipid Profile:   HgA1c:   TSH:

## 2025-03-21 NOTE — PROGRESS NOTE ADULT - PROBLEM SELECTOR PLAN 1
- EKG ventricular paced at 62, unchanged from previous  - Patient endorsing exertional chest pain that improves with rest  - Troponin 906; trend q6h until peak  - Cardiac cath on 01/13/2025 - RCA: Prox 70%, mid 80%, distal 90% stenosis; S/P Rotational atherectomy with RotaPro, IVUS (severe calcium) and PCI with 3 ZEINAB  - TTE from 12/2024 with EF of 39% with multiple segment abnormalities, mild-mod MR, and mild TR  - s/p LHC x 1 ZEINBA to RCA ISR. switch to brilinta. continue ASA   - continue atorvastatin 80 mg daily, Ranolazine 500 mg BID, Imdur 60 mg daily  - Continuous telemetry monitoring.

## 2025-03-22 LAB
A1C WITH ESTIMATED AVERAGE GLUCOSE RESULT: 8.5 % — HIGH (ref 4–5.6)
ANION GAP SERPL CALC-SCNC: 13 MMOL/L — SIGNIFICANT CHANGE UP (ref 5–17)
APPEARANCE UR: ABNORMAL
BACTERIA # UR AUTO: ABNORMAL /HPF
BILIRUB UR-MCNC: NEGATIVE — SIGNIFICANT CHANGE UP
BUN SERPL-MCNC: 32.3 MG/DL — HIGH (ref 8–20)
CALCIUM SERPL-MCNC: 7.6 MG/DL — LOW (ref 8.4–10.5)
CAST: 0 /LPF — SIGNIFICANT CHANGE UP (ref 0–4)
CHLORIDE SERPL-SCNC: 103 MMOL/L — SIGNIFICANT CHANGE UP (ref 96–108)
CHOLEST SERPL-MCNC: 120 MG/DL — SIGNIFICANT CHANGE UP
CO2 SERPL-SCNC: 21 MMOL/L — LOW (ref 22–29)
COLOR SPEC: YELLOW — SIGNIFICANT CHANGE UP
CREAT SERPL-MCNC: 1.63 MG/DL — HIGH (ref 0.5–1.3)
DIFF PNL FLD: ABNORMAL
EGFR: 33 ML/MIN/1.73M2 — LOW
EGFR: 33 ML/MIN/1.73M2 — LOW
ESTIMATED AVERAGE GLUCOSE: 197 MG/DL — HIGH (ref 68–114)
GLUCOSE BLDC GLUCOMTR-MCNC: 236 MG/DL — HIGH (ref 70–99)
GLUCOSE BLDC GLUCOMTR-MCNC: 247 MG/DL — HIGH (ref 70–99)
GLUCOSE BLDC GLUCOMTR-MCNC: 291 MG/DL — HIGH (ref 70–99)
GLUCOSE BLDC GLUCOMTR-MCNC: 301 MG/DL — HIGH (ref 70–99)
GLUCOSE SERPL-MCNC: 223 MG/DL — HIGH (ref 70–99)
GLUCOSE UR QL: >=1000 MG/DL
HCT VFR BLD CALC: 33.8 % — LOW (ref 34.5–45)
HDLC SERPL-MCNC: 34 MG/DL — LOW
HGB BLD-MCNC: 11 G/DL — LOW (ref 11.5–15.5)
KETONES UR-MCNC: NEGATIVE MG/DL — SIGNIFICANT CHANGE UP
LDLC SERPL-MCNC: 59 MG/DL — SIGNIFICANT CHANGE UP
LEUKOCYTE ESTERASE UR-ACNC: ABNORMAL
LIPID PNL WITH DIRECT LDL SERPL: 59 MG/DL — SIGNIFICANT CHANGE UP
MAGNESIUM SERPL-MCNC: 2.5 MG/DL — SIGNIFICANT CHANGE UP (ref 1.6–2.6)
MCHC RBC-ENTMCNC: 28.1 PG — SIGNIFICANT CHANGE UP (ref 27–34)
MCHC RBC-ENTMCNC: 32.5 G/DL — SIGNIFICANT CHANGE UP (ref 32–36)
MCV RBC AUTO: 86.4 FL — SIGNIFICANT CHANGE UP (ref 80–100)
NITRITE UR-MCNC: NEGATIVE — SIGNIFICANT CHANGE UP
NONHDLC SERPL-MCNC: 86 MG/DL — SIGNIFICANT CHANGE UP
NRBC # BLD AUTO: 0 K/UL — SIGNIFICANT CHANGE UP (ref 0–0)
NRBC # FLD: 0 K/UL — SIGNIFICANT CHANGE UP (ref 0–0)
NRBC BLD AUTO-RTO: 0 /100 WBCS — SIGNIFICANT CHANGE UP (ref 0–0)
PH UR: 7 — SIGNIFICANT CHANGE UP (ref 5–8)
PLATELET # BLD AUTO: 370 K/UL — SIGNIFICANT CHANGE UP (ref 150–400)
PMV BLD: 10.3 FL — SIGNIFICANT CHANGE UP (ref 7–13)
POTASSIUM SERPL-MCNC: 4.1 MMOL/L — SIGNIFICANT CHANGE UP (ref 3.5–5.3)
POTASSIUM SERPL-SCNC: 4.1 MMOL/L — SIGNIFICANT CHANGE UP (ref 3.5–5.3)
PROT UR-MCNC: 30 MG/DL
RAPID RVP RESULT: SIGNIFICANT CHANGE UP
RBC # BLD: 3.91 M/UL — SIGNIFICANT CHANGE UP (ref 3.8–5.2)
RBC # FLD: 15.1 % — HIGH (ref 10.3–14.5)
RBC CASTS # UR COMP ASSIST: 104 /HPF — HIGH (ref 0–4)
SARS-COV-2 RNA SPEC QL NAA+PROBE: SIGNIFICANT CHANGE UP
SODIUM SERPL-SCNC: 137 MMOL/L — SIGNIFICANT CHANGE UP (ref 135–145)
SP GR SPEC: 1.02 — SIGNIFICANT CHANGE UP (ref 1–1.03)
SQUAMOUS # UR AUTO: 0 /HPF — SIGNIFICANT CHANGE UP (ref 0–5)
TRIGL SERPL-MCNC: 154 MG/DL — HIGH
UROBILINOGEN FLD QL: 1 MG/DL — SIGNIFICANT CHANGE UP (ref 0.2–1)
WBC # BLD: 9.03 K/UL — SIGNIFICANT CHANGE UP (ref 3.8–10.5)
WBC # FLD AUTO: 9.03 K/UL — SIGNIFICANT CHANGE UP (ref 3.8–10.5)
WBC UR QL: 183 /HPF — HIGH (ref 0–5)

## 2025-03-22 PROCEDURE — 99233 SBSQ HOSP IP/OBS HIGH 50: CPT

## 2025-03-22 PROCEDURE — 93010 ELECTROCARDIOGRAM REPORT: CPT

## 2025-03-22 PROCEDURE — 74176 CT ABD & PELVIS W/O CONTRAST: CPT | Mod: 26

## 2025-03-22 PROCEDURE — 99232 SBSQ HOSP IP/OBS MODERATE 35: CPT

## 2025-03-22 RX ORDER — CEFTRIAXONE 500 MG/1
2000 INJECTION, POWDER, FOR SOLUTION INTRAMUSCULAR; INTRAVENOUS EVERY 24 HOURS
Refills: 0 | Status: DISCONTINUED | OUTPATIENT
Start: 2025-03-22 | End: 2025-03-23

## 2025-03-22 RX ORDER — ACETAMINOPHEN 500 MG/5ML
1000 LIQUID (ML) ORAL ONCE
Refills: 0 | Status: COMPLETED | OUTPATIENT
Start: 2025-03-22 | End: 2025-03-22

## 2025-03-22 RX ORDER — CEFTRIAXONE 500 MG/1
1000 INJECTION, POWDER, FOR SOLUTION INTRAMUSCULAR; INTRAVENOUS EVERY 24 HOURS
Refills: 0 | Status: DISCONTINUED | OUTPATIENT
Start: 2025-03-22 | End: 2025-03-22

## 2025-03-22 RX ORDER — SACUBITRIL AND VALSARTAN 6; 6 MG/1; MG/1
1 PELLET ORAL
Refills: 0 | Status: DISCONTINUED | OUTPATIENT
Start: 2025-03-22 | End: 2025-03-23

## 2025-03-22 RX ORDER — HYDROMORPHONE/SOD CHLOR,ISO/PF 2 MG/10 ML
0.5 SYRINGE (ML) INJECTION ONCE
Refills: 0 | Status: DISCONTINUED | OUTPATIENT
Start: 2025-03-22 | End: 2025-03-31

## 2025-03-22 RX ORDER — INSULIN LISPRO 100 U/ML
INJECTION, SOLUTION INTRAVENOUS; SUBCUTANEOUS
Refills: 0 | Status: DISCONTINUED | OUTPATIENT
Start: 2025-03-22 | End: 2025-03-31

## 2025-03-22 RX ADMIN — FUROSEMIDE 40 MILLIGRAM(S): 10 INJECTION INTRAMUSCULAR; INTRAVENOUS at 05:35

## 2025-03-22 RX ADMIN — TICAGRELOR 90 MILLIGRAM(S): 90 TABLET ORAL at 17:32

## 2025-03-22 RX ADMIN — INSULIN LISPRO 6: 100 INJECTION, SOLUTION INTRAVENOUS; SUBCUTANEOUS at 13:10

## 2025-03-22 RX ADMIN — INSULIN LISPRO 4: 100 INJECTION, SOLUTION INTRAVENOUS; SUBCUTANEOUS at 05:42

## 2025-03-22 RX ADMIN — RANOLAZINE 500 MILLIGRAM(S): 1000 TABLET, FILM COATED, EXTENDED RELEASE ORAL at 05:34

## 2025-03-22 RX ADMIN — Medication 4 MILLIGRAM(S): at 11:52

## 2025-03-22 RX ADMIN — TICAGRELOR 90 MILLIGRAM(S): 90 TABLET ORAL at 05:34

## 2025-03-22 RX ADMIN — Medication 650 MILLIGRAM(S): at 23:00

## 2025-03-22 RX ADMIN — RANOLAZINE 500 MILLIGRAM(S): 1000 TABLET, FILM COATED, EXTENDED RELEASE ORAL at 00:13

## 2025-03-22 RX ADMIN — Medication 650 MILLIGRAM(S): at 09:00

## 2025-03-22 RX ADMIN — Medication 325 MILLIGRAM(S): at 08:59

## 2025-03-22 RX ADMIN — Medication 3 MILLIGRAM(S): at 22:03

## 2025-03-22 RX ADMIN — ISOSORBIDE MONONITRATE 60 MILLIGRAM(S): 60 TABLET, EXTENDED RELEASE ORAL at 08:59

## 2025-03-22 RX ADMIN — CEFTRIAXONE 2000 MILLIGRAM(S): 500 INJECTION, POWDER, FOR SOLUTION INTRAMUSCULAR; INTRAVENOUS at 15:20

## 2025-03-22 RX ADMIN — Medication 1000 MILLIGRAM(S): at 15:50

## 2025-03-22 RX ADMIN — METOPROLOL SUCCINATE 100 MILLIGRAM(S): 50 TABLET, EXTENDED RELEASE ORAL at 05:34

## 2025-03-22 RX ADMIN — ATORVASTATIN CALCIUM 80 MILLIGRAM(S): 80 TABLET, FILM COATED ORAL at 22:03

## 2025-03-22 RX ADMIN — Medication 650 MILLIGRAM(S): at 10:00

## 2025-03-22 RX ADMIN — FUROSEMIDE 40 MILLIGRAM(S): 10 INJECTION INTRAMUSCULAR; INTRAVENOUS at 14:54

## 2025-03-22 RX ADMIN — INSULIN LISPRO 8: 100 INJECTION, SOLUTION INTRAVENOUS; SUBCUTANEOUS at 17:33

## 2025-03-22 RX ADMIN — RANOLAZINE 500 MILLIGRAM(S): 1000 TABLET, FILM COATED, EXTENDED RELEASE ORAL at 17:32

## 2025-03-22 RX ADMIN — GABAPENTIN 400 MILLIGRAM(S): 400 CAPSULE ORAL at 22:03

## 2025-03-22 RX ADMIN — Medication 400 MILLIGRAM(S): at 14:56

## 2025-03-22 RX ADMIN — Medication 1 APPLICATION(S): at 09:00

## 2025-03-22 RX ADMIN — SACUBITRIL AND VALSARTAN 1 TABLET(S): 6; 6 PELLET ORAL at 17:36

## 2025-03-22 RX ADMIN — Medication 81 MILLIGRAM(S): at 08:59

## 2025-03-22 RX ADMIN — INSULIN GLARGINE-YFGN 26 UNIT(S): 100 INJECTION, SOLUTION SUBCUTANEOUS at 22:03

## 2025-03-22 RX ADMIN — Medication 650 MILLIGRAM(S): at 22:02

## 2025-03-22 NOTE — PROGRESS NOTE ADULT - SUBJECTIVE AND OBJECTIVE BOX
Department of Cardiology                                                                  [place] [address] [address] [facility]                                                            Telephone: [phone]. Fax:[phone]                                                                      INTERVENTIONAL CARDIOLOGY CATH NOTE       Subjective:  74y Female who had a left heart catheterization which showed:  Coronary Angiography   ·	The coronary circulation is right dominant.    LM   ·	Left main artery: Angiography shows minor irregularities.    LAD   ·	Left anterior descending artery: Angiography shows severe atherosclerosis. Severe stenosis diffusely from mid to distal .  CX   ·	Circumflex: Angiography shows severe atherosclerosis. Severe stenosis in OM1 and 2.  RCA   ·	Right coronary artery: Angiography shows severe atherosclerosis.   ·	Mid: 50% ISR, A successful Balloon angioplasty was performed using a EUPHORA NC 3.5 X 20MM balloon.  ·	Distal: 99% ISR with JAMES 2 flow. A successful Drug Eluting Stent was deployed using a SHELLEY FRONTIER 4.0 X 18MM ZEINAB. A successful Balloon angioplasty was performed using a SHELLEY FRONTIER 4.0 X 18MM balloon.    Interval history: Patient c/o constant abdominal pain, nausea/vomiting, fever/chills, productive cough, SOB, and back pain since this morning. She denies chest pain.    HPI: 74F with PMHx HTN, CAD S/P PCI x3 (01/13/2025), HFrEF, CardioMEMS, heart block S/P micra PPM, PAD, CVA, DM, CKD. Patient was sent to [facility] ED from cardiology office with complaints of SOB and lower extremity swelling x3 days. Patient endorses fever and cough. Swelling was not improving despite increasing PO doses of Lasix at home. CardioMEMS readings were at 24, goal is 14, and patient has also noted weight gain. Patient states she has been experiencing exertional chest pain that improves with rest. Denies headaches, dizziness, palpitations, nausea, or diaphoresis. Denies recent sick contacts or recent travel. Cardiology was consulted for HF exacerbation and patient follows with Dr. Allen. On presentation, initial troponin was 906.    PAST MEDICAL & SURGICAL HISTORY:  DM (diabetes mellitus)  HTN (hypertension)  H/O gastroesophageal reflux (GERD)  Cardiac pacemaker: MICRA for mobitz type 11  CVA (cerebrovascular accident): resdiual right sided weakness  CVA (cerebrovascular accident)  PAD (peripheral artery disease)  Wound of right leg  History of benign brain tumor  Cataract  History of biopsy  Cardiac pacemaker  S/P angiogram of extremity    Allergies: No Known Allergies    Home Medications:  gabapentin 800 mg oral tablet: 0.5 tab(s) orally once a day (14 Jan 2025 11:55)  insulin glargine 100 units/mL subcutaneous solution: 26 unit(s) subcutaneous once a day (at bedtime) (09 Jan 2025 14:25)  metoprolol succinate 100 mg oral tablet, extended release: 1 tab(s) orally once a day (09 Jan 2025 14:25)  ranolazine 500 mg oral tablet, extended release: 1 tab(s) orally 2 times a day (09 Jan 2025 14:25)    MEDICATIONS  (STANDING):  aspirin enteric coated 81 milliGRAM(s) Oral daily  atorvastatin 80 milliGRAM(s) Oral at bedtime  cefTRIAXone Injectable. 2000 milliGRAM(s) IV Push every 24 hours  chlorhexidine 2% Cloths 1 Application(s) Topical daily  dextrose 5%. 1000 milliLiter(s) (100 mL/Hr) IV Continuous <Continuous>  dextrose 5%. 1000 milliLiter(s) (50 mL/Hr) IV Continuous <Continuous>  dextrose 50% Injectable 25 Gram(s) IV Push once  dextrose 50% Injectable 12.5 Gram(s) IV Push once  dextrose 50% Injectable 25 Gram(s) IV Push once  ferrous    sulfate 325 milliGRAM(s) Oral daily  furosemide   Injectable 40 milliGRAM(s) IV Push two times a day  gabapentin 400 milliGRAM(s) Oral at bedtime  glucagon  Injectable 1 milliGRAM(s) IntraMuscular once  HYDROmorphone  Injectable 0.5 milliGRAM(s) IV Push once  insulin glargine Injectable (LANTUS) 26 Unit(s) SubCutaneous at bedtime  insulin lispro (ADMELOG) corrective regimen sliding scale   SubCutaneous three times a day before meals  isosorbide   mononitrate ER Tablet (IMDUR) 60 milliGRAM(s) Oral daily  metoprolol succinate  milliGRAM(s) Oral daily  ranolazine 500 milliGRAM(s) Oral two times a day  ticagrelor 90 milliGRAM(s) Oral two times a day    MEDICATIONS  (PRN):  acetaminophen     Tablet .. 650 milliGRAM(s) Oral every 6 hours PRN Temp greater or equal to 38C (100.4F), Mild Pain (1 - 3)  aluminum hydroxide/magnesium hydroxide/simethicone Suspension 30 milliLiter(s) Oral every 4 hours PRN Dyspepsia  dextrose Oral Gel 15 Gram(s) Oral once PRN Blood Glucose LESS THAN 70 milliGRAM(s)/deciliter  melatonin 3 milliGRAM(s) Oral at bedtime PRN Insomnia  ondansetron Injectable 4 milliGRAM(s) IV Push every 8 hours PRN Nausea and/or Vomiting    ROS:   General: No fatigue  HEENT: No headache, no epistaxis.  CV: No chest pain, no palpitations.  Respiratory: No SOB, no cough, no wheeze  GI: No nausea    Objective:  Vital Signs Last 24 Hrs  T(C): 38.7 (22 Mar 2025 13:50), Max: 38.7 (22 Mar 2025 13:50)  T(F): 101.7 (22 Mar 2025 13:50), Max: 101.7 (22 Mar 2025 13:50)  HR: 64 (22 Mar 2025 14:50) (62 - 67)  BP: 150/77 (22 Mar 2025 14:50) (124/77 - 165/75)  BP(mean): 93 (22 Mar 2025 00:08) (93 - 105)  RR: 16 (22 Mar 2025 14:50) (16 - 18)  SpO2: 96% (22 Mar 2025 14:50) (96% - 99%)    Parameters below as of 22 Mar 2025 14:50  Patient On (Oxygen Delivery Method): room air    General: Awake, alert, speech clear, in no acute distress.  Chest: CTA, S1, S2, no murmurs.  Abdomen, Soft, nondistended  Right Groin: Soft, no bleeding, no hematoma.  Extremities: No edema, + distal pulses.    EKG:     Echo: 3/21/2025  ·	Left Ventricle: After obtaining consent, Definity ultrasound enhancing agent was given for enhanced left ventricular opacification and improved delineation of the left ventricular endocardial borders. Complications from contrast: a headache. The left ventricular cavity is normal in size. Left ventricular systolic function is moderately decreased with a calculated ejection fraction of 31 % by the Taylor's biplane method of disks. There are regional wall motion abnormalities present. There is increased LV mass and eccentric hypertrophy. There is no evidence of a left ventricular thrombus. There is moderate (grade 2) left ventricular diastolic dysfunction, with elevated left ventricular filling pressure.  ·	LV Wall Scoring: The entire apex, entire inferior septum, and mid and apical anterior septum are akinetic. The basal and mid anterior wall, basal and mid anterolateral wall, basal and mid inferior wall, basal and mid inferolateral wall, and basal anteroseptal segment are hypokinetic.  ·	Right Ventricle: There is hypokinesis of the right ventricular free wall. The right ventricular cavity is normal in size and right ventricular systolic function is normal.  ·	Left Atrium: The left atrium is severely dilated with an indexed volume of 51.90 ml/m².  ·	Right Atrium: The right atrium is normal in size with an indexed volume of 14.06 ml/m² and an indexed area of 6.67 cm²/m².  ·	Interatrial Septum: There is no evidence of a patent foramen ovale by color flow Doppler.  ·	Aortic Valve: The aortic valve appears trileaflet. There is mild thickening of the aortic valve leaflets. There is fibrocalcific aortic valve sclerosis without stenosis. There is no evidence of aortic regurgitation.  ·	Mitral Valve: There is mitral valve thickening of the anterior and posterior leaflets. There is calcification of the mitral valve annulus. There is no mitralvalve stenosis. There is moderate mitral regurgitation.  ·	Tricuspid Valve: Structurally normal tricuspid valve with normal leaflet excursion. There is no evidence of tricuspid stenosis. There is mild tricuspid regurgitation. There is blunting of the hepatic vein flow consistent with elevated right atrial pressure. Estimated pulmonary artery systolic pressure is 58 mmHg, consistent with elevated pulmonary artery pressure.  ·	Pulmonic Valve: Structurally normal pulmonic valve with normal leaflet excursion. There is no pulmonic valve stenosis. There is trace pulmonic regurgitation.  ·	Pulmonary Artery: The branch pulmonary arteries are not well visualized.  ·	Aorta: The aortic root, ascending aorta and aortic arch appear normal in size. The aortic root at the sinuses of Valsalva is normal in size, measuring 3.20 cm (indexed 2.14 cm/m²). The ascending aorta is normal in size, measuring 3.20 cm (indexed 2.14 cm/m²). The aortic arch diameter is normal in size, measuring 2.5 cm (indexed1.67 cm/m²).  ·	Pericardium: No pericardial effusion seen.  ·	Systemic Veins: The inferior vena cava is normal in size measuring 1.10 cm in diameter, (normal <2.1cm) with abnormal inspiratory collapse (abnormal <50%) consistent with mildly elevated right atrial pressure (~8, range 5-10mmHg).                          11.0   9.03  )-----------( 370      ( 22 Mar 2025 04:45 )             33.8     03-22    137  |  103  |  32.3[H]  ----------------------------<  223[H]  4.1   |  21.0[L]  |  1.63[H]    Ca    7.6[L]      22 Mar 2025 04:45  Mg     2.5     03-22    PTT - ( 21 Mar 2025 04:48 )  PTT:61.4 sec    CT ABDOMEN AND PELVIS: 03/22/2025  ·	No evidence of retroperitoneal hemorrhage or ileus.  ·	Nondependent locules of air in the bladder and left ureter without perivesicular fat stranding, nonspecific, could be secondary to recent instrumentation versus cystitis. Recommend clinical correlation.  ·	Enlarged, edematous left kidney with increased perinephric fat stranding and air in the left renal collecting system can be seen in the setting of pyelonephritis/ureteritis. Evaluation is limited due to noncontrast technique. Correlation with laboratory values suggested                                                                            Department of Cardiology                                                                  [place] [address] [address] [facility]                                                            Telephone: [phone]. Fax:[phone]                                                                      INTERVENTIONAL CARDIOLOGY CATH NOTE       Subjective:  74y Female who had a left heart catheterization which showed:  Coronary Angiography   ·	The coronary circulation is right dominant.    LM   ·	Left main artery: Angiography shows minor irregularities.    LAD   ·	Left anterior descending artery: Angiography shows severe atherosclerosis. Severe stenosis diffusely from mid to distal .  CX   ·	Circumflex: Angiography shows severe atherosclerosis. Severe stenosis in OM1 and 2.  RCA   ·	Right coronary artery: Angiography shows severe atherosclerosis.   ·	Mid: 50% ISR, A successful Balloon angioplasty was performed using a EUPHORA NC 3.5 X 20MM balloon.  ·	Distal: 99% ISR with JAMES 2 flow. A successful Drug Eluting Stent was deployed using a SHELLEY FRONTIER 4.0 X 18MM ZEINAB. A successful Balloon angioplasty was performed using a SHELLEY FRONTIER 4.0 X 18MM balloon.    Interval history: Patient c/o constant abdominal pain, nausea/vomiting, fever/chills, productive cough, SOB, and back pain since this morning. She denies chest pain.    HPI: 74F with PMHx HTN, CAD S/P PCI x3 (01/13/2025), HFrEF, CardioMEMS, heart block S/P micra PPM, PAD, CVA, DM, CKD. Patient was sent to [facility] ED from cardiology office with complaints of SOB and lower extremity swelling x3 days. Patient endorses fever and cough. Swelling was not improving despite increasing PO doses of Lasix at home. CardioMEMS readings were at 24, goal is 14, and patient has also noted weight gain. Patient states she has been experiencing exertional chest pain that improves with rest. Denies headaches, dizziness, palpitations, nausea, or diaphoresis. Denies recent sick contacts or recent travel. Cardiology was consulted for HF exacerbation and patient follows with Dr. Allen. On presentation, initial troponin was 906.    PAST MEDICAL & SURGICAL HISTORY:  DM (diabetes mellitus)  HTN (hypertension)  H/O gastroesophageal reflux (GERD)  Cardiac pacemaker: MICRA for mobitz type 11  CVA (cerebrovascular accident): resdiual right sided weakness  CVA (cerebrovascular accident)  PAD (peripheral artery disease)  Wound of right leg  History of benign brain tumor  Cataract  History of biopsy  Cardiac pacemaker  S/P angiogram of extremity    Allergies: No Known Allergies    Home Medications:  gabapentin 800 mg oral tablet: 0.5 tab(s) orally once a day (14 Jan 2025 11:55)  insulin glargine 100 units/mL subcutaneous solution: 26 unit(s) subcutaneous once a day (at bedtime) (09 Jan 2025 14:25)  metoprolol succinate 100 mg oral tablet, extended release: 1 tab(s) orally once a day (09 Jan 2025 14:25)  ranolazine 500 mg oral tablet, extended release: 1 tab(s) orally 2 times a day (09 Jan 2025 14:25)    MEDICATIONS  (STANDING):  aspirin enteric coated 81 milliGRAM(s) Oral daily  atorvastatin 80 milliGRAM(s) Oral at bedtime  cefTRIAXone Injectable. 2000 milliGRAM(s) IV Push every 24 hours  chlorhexidine 2% Cloths 1 Application(s) Topical daily  dextrose 5%. 1000 milliLiter(s) (100 mL/Hr) IV Continuous <Continuous>  dextrose 5%. 1000 milliLiter(s) (50 mL/Hr) IV Continuous <Continuous>  dextrose 50% Injectable 25 Gram(s) IV Push once  dextrose 50% Injectable 12.5 Gram(s) IV Push once  dextrose 50% Injectable 25 Gram(s) IV Push once  ferrous    sulfate 325 milliGRAM(s) Oral daily  furosemide   Injectable 40 milliGRAM(s) IV Push two times a day  gabapentin 400 milliGRAM(s) Oral at bedtime  glucagon  Injectable 1 milliGRAM(s) IntraMuscular once  HYDROmorphone  Injectable 0.5 milliGRAM(s) IV Push once  insulin glargine Injectable (LANTUS) 26 Unit(s) SubCutaneous at bedtime  insulin lispro (ADMELOG) corrective regimen sliding scale   SubCutaneous three times a day before meals  isosorbide   mononitrate ER Tablet (IMDUR) 60 milliGRAM(s) Oral daily  metoprolol succinate  milliGRAM(s) Oral daily  ranolazine 500 milliGRAM(s) Oral two times a day  ticagrelor 90 milliGRAM(s) Oral two times a day    MEDICATIONS  (PRN):  acetaminophen     Tablet .. 650 milliGRAM(s) Oral every 6 hours PRN Temp greater or equal to 38C (100.4F), Mild Pain (1 - 3)  aluminum hydroxide/magnesium hydroxide/simethicone Suspension 30 milliLiter(s) Oral every 4 hours PRN Dyspepsia  dextrose Oral Gel 15 Gram(s) Oral once PRN Blood Glucose LESS THAN 70 milliGRAM(s)/deciliter  melatonin 3 milliGRAM(s) Oral at bedtime PRN Insomnia  ondansetron Injectable 4 milliGRAM(s) IV Push every 8 hours PRN Nausea and/or Vomiting    ROS:   General: No fatigue  HEENT: No headache, no epistaxis.  CV: No chest pain, no palpitations.  Respiratory: No SOB, no cough, no wheeze  GI: No nausea    Objective:  Vital Signs Last 24 Hrs  T(C): 38.7 (22 Mar 2025 13:50), Max: 38.7 (22 Mar 2025 13:50)  T(F): 101.7 (22 Mar 2025 13:50), Max: 101.7 (22 Mar 2025 13:50)  HR: 64 (22 Mar 2025 14:50) (62 - 67)  BP: 150/77 (22 Mar 2025 14:50) (124/77 - 165/75)  BP(mean): 93 (22 Mar 2025 00:08) (93 - 105)  RR: 16 (22 Mar 2025 14:50) (16 - 18)  SpO2: 96% (22 Mar 2025 14:50) (96% - 99%)    Parameters below as of 22 Mar 2025 14:50  Patient On (Oxygen Delivery Method): room air    General: Awake, alert, speech clear, in no acute distress.  Chest: CTA, S1, S2, no murmurs.  Abdomen, Soft, nondistended  Right Groin: Soft, no bleeding, no hematoma.  Extremities: No edema, + distal pulses.    EKG: V. paced    Echo: 3/21/2025  ·	Left Ventricle: After obtaining consent, Definity ultrasound enhancing agent was given for enhanced left ventricular opacification and improved delineation of the left ventricular endocardial borders. Complications from contrast: a headache. The left ventricular cavity is normal in size. Left ventricular systolic function is moderately decreased with a calculated ejection fraction of 31 % by the Taylor's biplane method of disks. There are regional wall motion abnormalities present. There is increased LV mass and eccentric hypertrophy. There is no evidence of a left ventricular thrombus. There is moderate (grade 2) left ventricular diastolic dysfunction, with elevated left ventricular filling pressure.  ·	LV Wall Scoring: The entire apex, entire inferior septum, and mid and apical anterior septum are akinetic. The basal and mid anterior wall, basal and mid anterolateral wall, basal and mid inferior wall, basal and mid inferolateral wall, and basal anteroseptal segment are hypokinetic.  ·	Right Ventricle: There is hypokinesis of the right ventricular free wall. The right ventricular cavity is normal in size and right ventricular systolic function is normal.  ·	Left Atrium: The left atrium is severely dilated with an indexed volume of 51.90 ml/m².  ·	Right Atrium: The right atrium is normal in size with an indexed volume of 14.06 ml/m² and an indexed area of 6.67 cm²/m².  ·	Interatrial Septum: There is no evidence of a patent foramen ovale by color flow Doppler.  ·	Aortic Valve: The aortic valve appears trileaflet. There is mild thickening of the aortic valve leaflets. There is fibrocalcific aortic valve sclerosis without stenosis. There is no evidence of aortic regurgitation.  ·	Mitral Valve: There is mitral valve thickening of the anterior and posterior leaflets. There is calcification of the mitral valve annulus. There is no mitralvalve stenosis. There is moderate mitral regurgitation.  ·	Tricuspid Valve: Structurally normal tricuspid valve with normal leaflet excursion. There is no evidence of tricuspid stenosis. There is mild tricuspid regurgitation. There is blunting of the hepatic vein flow consistent with elevated right atrial pressure. Estimated pulmonary artery systolic pressure is 58 mmHg, consistent with elevated pulmonary artery pressure.  ·	Pulmonic Valve: Structurally normal pulmonic valve with normal leaflet excursion. There is no pulmonic valve stenosis. There is trace pulmonic regurgitation.  ·	Pulmonary Artery: The branch pulmonary arteries are not well visualized.  ·	Aorta: The aortic root, ascending aorta and aortic arch appear normal in size. The aortic root at the sinuses of Valsalva is normal in size, measuring 3.20 cm (indexed 2.14 cm/m²). The ascending aorta is normal in size, measuring 3.20 cm (indexed 2.14 cm/m²). The aortic arch diameter is normal in size, measuring 2.5 cm (indexed1.67 cm/m²).  ·	Pericardium: No pericardial effusion seen.  ·	Systemic Veins: The inferior vena cava is normal in size measuring 1.10 cm in diameter, (normal <2.1cm) with abnormal inspiratory collapse (abnormal <50%) consistent with mildly elevated right atrial pressure (~8, range 5-10mmHg).                          11.0   9.03  )-----------( 370      ( 22 Mar 2025 04:45 )             33.8     03-22    137  |  103  |  32.3[H]  ----------------------------<  223[H]  4.1   |  21.0[L]  |  1.63[H]    Ca    7.6[L]      22 Mar 2025 04:45  Mg     2.5     03-22    PTT - ( 21 Mar 2025 04:48 )  PTT:61.4 sec    CT ABDOMEN AND PELVIS: 03/22/2025  ·	No evidence of retroperitoneal hemorrhage or ileus.  ·	Nondependent locules of air in the bladder and left ureter without perivesicular fat stranding, nonspecific, could be secondary to recent instrumentation versus cystitis. Recommend clinical correlation.  ·	Enlarged, edematous left kidney with increased perinephric fat stranding and air in the left renal collecting system can be seen in the setting of pyelonephritis/ureteritis. Evaluation is limited due to noncontrast technique. Correlation with laboratory values suggested

## 2025-03-22 NOTE — PROGRESS NOTE ADULT - ASSESSMENT
75 y/o female with PMH of HTN, HLD, CAD s/p PCI, HFrEF, Mobitz II s/p PPM (MD NereydaT), PAD with chronic right LE wound, CVA, DM2, CKD3 came to the ED complaining of chest pressure. Found to have acute on chronic CHF and NSTEMI in ED.       #NSTEMI   -Troponin:: 906,GUERO: v-paced ,s/p heparin ,Aspirin loaded in the ED  -S/p LHC on 03/22/2024 with PCI to RCA with ZEINAB  -Aspirin 81 mg  -Brillianta  -Continue Statin, Ranolazine 500mg bid   -cont Toprol 100mg   -hold Farxiga and Entresto for now   -cont Lipitor to 80qhs   -IV Lasix 40mg BID as per CHF team  -f/u dr. Bajwa in 1-2 weeks     *Acute on chronic CHFrEF   -Pro-BNP: 02479  - IV Lasix 40mg bid, monitor renal function  -Heart failure team is following  -daily weight   -Farxiga 10mg on hold   -Entresto on hold due to BRADY   Oxygen therapy as needed     *BRADY on CKD-3   baseline 1.4-1.8  secondary to fluid overload    Monitor renal function   Cont lasix 40mg BID     Pyelonephritis  -abdominal pain and nausea on 03/22 with fever  -UA posisitve  -Started on Ceftriaxone 2 gm daily  -CT suggestive of right pyelonephrtis  -Follow up on blood and curine culture    #HTN/HLD/CAD   Aspirin 81mg   Plavix 75mg   Atorvastatin 80mg   Metoprolol ER 100mg   Ranolazine ER 500mg bid   Imdur 60mg     #DM-2   Lantus 26 units HS, s/p half while NPO   resume lantus 26u qhs   Insulin sliding scale     #Anemia   Likely of chronic disease   Hb stable   Iron tab  Monitor CBC         Dispo- POssible discharge in 2-3 days after improvement SOB, culture report

## 2025-03-22 NOTE — PROGRESS NOTE ADULT - ASSESSMENT
Procedure: LHC and PCI     1. S/P LHC and PCI of the RCA  ·	Post procedure instructions explained.  ·	Medications:   ·	     DAPT: Will continue Brilinta 90 mg twice daily and aspirin 81 mg daily  ·	     Statin: Will continue Lipitor 80 mg daily  ·	     Beta Blocker: Will continue Toprol 100 mg daily  ·	     RAAS Inhibition: Renal  ·	     Other Medications: Will continue Imdur 60 mg daily and Ranexa 500 mg BID  ·	Cardiac rehab info provided/referral and communication to cardiac rehab completed  ·	Aggressive lifestyle modification.    2. HFrEF  ·	GDMT  ·	     Beta Blocker: Will continue Toprol 100 mg daily  ·	     RAAS Inhibitor: Renal  ·	     MRA: N/A  ·	     Diuretic: Will continue Lasix 40 mg IV BID  ·	     SGLT2i: N/A  ·	     Other: Will continue Imdur 60 mg daily  ·	Strict I&O's  ·	Daily standing weights (if able)    3. Fever  ·	UA: Moderate leuk esterase, + bacteria, + WBC  ·	Rocephin 2 gm IV daily started  ·	Blood cultures ordered  ·	Urine culture sent  ·	RVP sent

## 2025-03-22 NOTE — PROGRESS NOTE ADULT - NS ATTEND AMEND GEN_ALL_CORE FT
74F with PMHx HTN, CAD S/P PCI x3 (01/13/2025), HFrEF, CardioMEMS, heart block S/P micra PPM, PAD, CVA, DM, CKD. Patient was sent to [facility] ED from cardiology office with complaints of SOB and lower extremity swelling x3 days.    Non-ST elevation MI (NSTEMI)/ CAD s/p PCI x3 (01/13/2025) -   EKG ventricular paced at 62, unchanged from previous  - Patient endorsing exertional chest pain that improves with rest  - Troponin 906; trend q6h until peak  - Cardiac cath on 01/13/2025 - RCA: Prox 70%, mid 80%, distal 90% stenosis; S/P Rotational atherectomy with RotaPro, IVUS (severe calcium) and PCI with 3 ZEINAB and then 1/22/2025: LAD and LCX appear the same as last time. Culprit was 99% distal RCA instent restenosis. S/p PCI        - s/p LHC x 1 ZEINAB to RCA ISR. switch to brilinta. continue ASA  - TTE from 12/2024 with EF of 39% with multiple segment abnormalities, mild-mod MR, and mild TR  - continue atorvastatin 80 mg daily, Ranolazine 500 mg BID, Imdur 60 mg daily  - Continuous telemetry monitoring.    Acute on Chronic HFrEF LVEF 39 % - LVEDP 17, PAD on her CardioMEMS was 22 (goal 14). Will resume diuretics with Lasix 40 mg IV BID. Consider resuming Entresto 24/26mg po BID   recheck cardiomems on Sunday     Becky Pan D.O. [facility]  Cardiology/Vascular Cardiology -[facility] Cardiology   Telephone # [phone].

## 2025-03-22 NOTE — PROGRESS NOTE ADULT - SUBJECTIVE AND OBJECTIVE BOX
[facility] Division of Hospital Medicine  Hillary Padilla MD   I'm reachable on [employer] Teams      Patient is a 74y old  Female who presents with a chief complaint of     SUBJECTIVE / OVERNIGHT EVENTS:   Patient was seen and examined during rounds    REoprts nausea and severe left sided abdominal pain  UA positive for UTI. CT abdomen suggestive of pyelonephritis. Started on Ceftriaxone  Urine culture pending    12 points ROS negative except above    MEDICATIONS  (STANDING):  aspirin enteric coated 81 milliGRAM(s) Oral daily  atorvastatin 80 milliGRAM(s) Oral at bedtime  cefTRIAXone Injectable. 2000 milliGRAM(s) IV Push every 24 hours  chlorhexidine 2% Cloths 1 Application(s) Topical daily  dextrose 5%. 1000 milliLiter(s) (100 mL/Hr) IV Continuous <Continuous>  dextrose 5%. 1000 milliLiter(s) (50 mL/Hr) IV Continuous <Continuous>  dextrose 50% Injectable 25 Gram(s) IV Push once  dextrose 50% Injectable 12.5 Gram(s) IV Push once  dextrose 50% Injectable 25 Gram(s) IV Push once  ferrous    sulfate 325 milliGRAM(s) Oral daily  furosemide   Injectable 40 milliGRAM(s) IV Push two times a day  gabapentin 400 milliGRAM(s) Oral at bedtime  glucagon  Injectable 1 milliGRAM(s) IntraMuscular once  HYDROmorphone  Injectable 0.5 milliGRAM(s) IV Push once  insulin glargine Injectable (LANTUS) 26 Unit(s) SubCutaneous at bedtime  insulin lispro (ADMELOG) corrective regimen sliding scale   SubCutaneous three times a day before meals  isosorbide   mononitrate ER Tablet (IMDUR) 60 milliGRAM(s) Oral daily  metoprolol succinate  milliGRAM(s) Oral daily  ranolazine 500 milliGRAM(s) Oral two times a day  ticagrelor 90 milliGRAM(s) Oral two times a day    MEDICATIONS  (PRN):  acetaminophen     Tablet .. 650 milliGRAM(s) Oral every 6 hours PRN Temp greater or equal to 38C (100.4F), Mild Pain (1 - 3)  aluminum hydroxide/magnesium hydroxide/simethicone Suspension 30 milliLiter(s) Oral every 4 hours PRN Dyspepsia  dextrose Oral Gel 15 Gram(s) Oral once PRN Blood Glucose LESS THAN 70 milliGRAM(s)/deciliter  melatonin 3 milliGRAM(s) Oral at bedtime PRN Insomnia  ondansetron Injectable 4 milliGRAM(s) IV Push every 8 hours PRN Nausea and/or Vomiting        I&O's Summary    21 Mar 2025 07:01  -  22 Mar 2025 07:00  --------------------------------------------------------  IN: 0 mL / OUT: 1275 mL / NET: -1275 mL    22 Mar 2025 07:01  -  22 Mar 2025 16:37  --------------------------------------------------------  IN: 330 mL / OUT: 475 mL / NET: -145 mL        PHYSICAL EXAM:  Vital Signs Last 24 Hrs  T(C): 38.7 (22 Mar 2025 13:50), Max: 38.7 (22 Mar 2025 13:50)  T(F): 101.7 (22 Mar 2025 13:50), Max: 101.7 (22 Mar 2025 13:50)  HR: 64 (22 Mar 2025 14:50) (62 - 67)  BP: 150/77 (22 Mar 2025 14:50) (124/77 - 165/75)  BP(mean): 93 (22 Mar 2025 00:08) (93 - 105)  RR: 16 (22 Mar 2025 14:50) (16 - 18)  SpO2: 96% (22 Mar 2025 14:50) (96% - 99%)    Parameters below as of 22 Mar 2025 14:50  Patient On (Oxygen Delivery Method): room air        CONSTITUTIONAL: NAD, Not in acute distress  EYES:  EOMI, conjunctiva and sclera clear  ENMT: , neck supple , non-tender  RESPIRATORY: Normal respiratory effort; lungs are clear to auscultation bilaterally  CARDIOVASCULAR: S1S2 present  ABDOMEN: LLQ tenderness  MUSCLOSKELETAL: ; no clubbing or cyanosis of digits;   PSYCH: A+O to person, place, and time; affect appropriate  NEUROLOGY: CN, motor and sensory intact   SKIN: No rashes; no palpable lesions  Extremity: No bilateral edema      LABS:                        11.0   9.03  )-----------( 370      ( 22 Mar 2025 04:45 )             33.8         137  |  103  |  32.3[H]  ----------------------------<  223[H]  4.1   |  21.0[L]  |  1.63[H]    Ca    7.6[L]      22 Mar 2025 04:45  Mg     2.5     03-22      PTT - ( 21 Mar 2025 04:48 )  PTT:61.4 sec      Urinalysis Basic - ( 22 Mar 2025 12:45 )    Color: Yellow / Appearance: Cloudy / S.019 / pH: x  Gluc: x / Ketone: Negative mg/dL  / Bili: Negative / Urobili: 1.0 mg/dL   Blood: x / Protein: 30 mg/dL / Nitrite: Negative   Leuk Esterase: Moderate / RBC: 104 /HPF /  /HPF   Sq Epi: x / Non Sq Epi: 0 /HPF / Bacteria: Too Numerous to count /HPF        CAPILLARY BLOOD GLUCOSE      POCT Blood Glucose.: 291 mg/dL (22 Mar 2025 13:01)  POCT Blood Glucose.: 247 mg/dL (22 Mar 2025 05:40)  POCT Blood Glucose.: 195 mg/dL (21 Mar 2025 21:11)      Labs reviewed:    RADIOLOGY & ADDITIONAL TESTS:  Imaging Personally Reviewed:  Electrocardiogram Personally Reviewed:

## 2025-03-23 LAB
-  CTX-M RESISTANCE MARKER: SIGNIFICANT CHANGE UP
-  ESBL: SIGNIFICANT CHANGE UP
ALBUMIN SERPL ELPH-MCNC: 3.1 G/DL — LOW (ref 3.3–5.2)
ALP SERPL-CCNC: 103 U/L — SIGNIFICANT CHANGE UP (ref 40–120)
ALT FLD-CCNC: 11 U/L — SIGNIFICANT CHANGE UP
ANION GAP SERPL CALC-SCNC: 17 MMOL/L — SIGNIFICANT CHANGE UP (ref 5–17)
AST SERPL-CCNC: 20 U/L — SIGNIFICANT CHANGE UP
BILIRUB SERPL-MCNC: 0.7 MG/DL — SIGNIFICANT CHANGE UP (ref 0.4–2)
BUN SERPL-MCNC: 44.8 MG/DL — HIGH (ref 8–20)
CALCIUM SERPL-MCNC: 7.5 MG/DL — LOW (ref 8.4–10.5)
CHLORIDE SERPL-SCNC: 93 MMOL/L — LOW (ref 96–108)
CO2 SERPL-SCNC: 17 MMOL/L — LOW (ref 22–29)
CREAT SERPL-MCNC: 2.83 MG/DL — HIGH (ref 0.5–1.3)
E COLI DNA BLD POS QL NAA+NON-PROBE: SIGNIFICANT CHANGE UP
EGFR: 17 ML/MIN/1.73M2 — LOW
EGFR: 17 ML/MIN/1.73M2 — LOW
GLUCOSE BLDC GLUCOMTR-MCNC: 199 MG/DL — HIGH (ref 70–99)
GLUCOSE BLDC GLUCOMTR-MCNC: 215 MG/DL — HIGH (ref 70–99)
GLUCOSE BLDC GLUCOMTR-MCNC: 263 MG/DL — HIGH (ref 70–99)
GLUCOSE BLDC GLUCOMTR-MCNC: 323 MG/DL — HIGH (ref 70–99)
GLUCOSE SERPL-MCNC: 254 MG/DL — HIGH (ref 70–99)
GRAM STN FLD: ABNORMAL
HCT VFR BLD CALC: 34.3 % — LOW (ref 34.5–45)
HGB BLD-MCNC: 10.4 G/DL — LOW (ref 11.5–15.5)
MAGNESIUM SERPL-MCNC: 2.2 MG/DL — SIGNIFICANT CHANGE UP (ref 1.8–2.6)
MCHC RBC-ENTMCNC: 28.4 PG — SIGNIFICANT CHANGE UP (ref 27–34)
MCHC RBC-ENTMCNC: 30.3 G/DL — LOW (ref 32–36)
MCV RBC AUTO: 93.7 FL — SIGNIFICANT CHANGE UP (ref 80–100)
METHOD TYPE: SIGNIFICANT CHANGE UP
NRBC # BLD AUTO: 0 K/UL — SIGNIFICANT CHANGE UP (ref 0–0)
NRBC # FLD: 0 K/UL — SIGNIFICANT CHANGE UP (ref 0–0)
NRBC BLD AUTO-RTO: 0 /100 WBCS — SIGNIFICANT CHANGE UP (ref 0–0)
PHOSPHATE SERPL-MCNC: 2.8 MG/DL — SIGNIFICANT CHANGE UP (ref 2.4–4.7)
PLATELET # BLD AUTO: 321 K/UL — SIGNIFICANT CHANGE UP (ref 150–400)
PMV BLD: 10.1 FL — SIGNIFICANT CHANGE UP (ref 7–13)
POTASSIUM SERPL-MCNC: 4.4 MMOL/L — SIGNIFICANT CHANGE UP (ref 3.5–5.3)
POTASSIUM SERPL-SCNC: 4.4 MMOL/L — SIGNIFICANT CHANGE UP (ref 3.5–5.3)
PROT SERPL-MCNC: 7 G/DL — SIGNIFICANT CHANGE UP (ref 6.6–8.7)
RBC # BLD: 3.66 M/UL — LOW (ref 3.8–5.2)
RBC # FLD: 15.7 % — HIGH (ref 10.3–14.5)
SODIUM SERPL-SCNC: 127 MMOL/L — LOW (ref 135–145)
SPECIMEN SOURCE: SIGNIFICANT CHANGE UP
SPECIMEN SOURCE: SIGNIFICANT CHANGE UP
WBC # BLD: 18.49 K/UL — HIGH (ref 3.8–10.5)
WBC # FLD AUTO: 18.49 K/UL — HIGH (ref 3.8–10.5)

## 2025-03-23 PROCEDURE — 99233 SBSQ HOSP IP/OBS HIGH 50: CPT

## 2025-03-23 PROCEDURE — 99232 SBSQ HOSP IP/OBS MODERATE 35: CPT

## 2025-03-23 PROCEDURE — 99223 1ST HOSP IP/OBS HIGH 75: CPT

## 2025-03-23 PROCEDURE — G0545: CPT

## 2025-03-23 RX ORDER — ERTAPENEM SODIUM 1 G/1
500 INJECTION, POWDER, LYOPHILIZED, FOR SOLUTION INTRAMUSCULAR; INTRAVENOUS EVERY 24 HOURS
Refills: 0 | Status: COMPLETED | OUTPATIENT
Start: 2025-03-23 | End: 2025-03-30

## 2025-03-23 RX ORDER — ACETAMINOPHEN 500 MG/5ML
1000 LIQUID (ML) ORAL ONCE
Refills: 0 | Status: COMPLETED | OUTPATIENT
Start: 2025-03-23 | End: 2025-03-23

## 2025-03-23 RX ORDER — OXYCODONE HYDROCHLORIDE 30 MG/1
5 TABLET ORAL EVERY 6 HOURS
Refills: 0 | Status: DISCONTINUED | OUTPATIENT
Start: 2025-03-23 | End: 2025-03-23

## 2025-03-23 RX ORDER — ALBUMIN (HUMAN) 12.5 G/50ML
50 INJECTION, SOLUTION INTRAVENOUS ONCE
Refills: 0 | Status: COMPLETED | OUTPATIENT
Start: 2025-03-23 | End: 2025-03-23

## 2025-03-23 RX ADMIN — Medication 1000 MILLIGRAM(S): at 18:33

## 2025-03-23 RX ADMIN — OXYCODONE HYDROCHLORIDE 5 MILLIGRAM(S): 30 TABLET ORAL at 10:47

## 2025-03-23 RX ADMIN — ERTAPENEM SODIUM 100 MILLIGRAM(S): 1 INJECTION, POWDER, LYOPHILIZED, FOR SOLUTION INTRAMUSCULAR; INTRAVENOUS at 10:36

## 2025-03-23 RX ADMIN — SACUBITRIL AND VALSARTAN 1 TABLET(S): 6; 6 PELLET ORAL at 05:15

## 2025-03-23 RX ADMIN — TICAGRELOR 90 MILLIGRAM(S): 90 TABLET ORAL at 16:51

## 2025-03-23 RX ADMIN — ISOSORBIDE MONONITRATE 60 MILLIGRAM(S): 60 TABLET, EXTENDED RELEASE ORAL at 10:35

## 2025-03-23 RX ADMIN — Medication 1 APPLICATION(S): at 10:35

## 2025-03-23 RX ADMIN — METOPROLOL SUCCINATE 100 MILLIGRAM(S): 50 TABLET, EXTENDED RELEASE ORAL at 05:16

## 2025-03-23 RX ADMIN — Medication 650 MILLIGRAM(S): at 06:00

## 2025-03-23 RX ADMIN — TICAGRELOR 90 MILLIGRAM(S): 90 TABLET ORAL at 05:16

## 2025-03-23 RX ADMIN — Medication 325 MILLIGRAM(S): at 10:35

## 2025-03-23 RX ADMIN — Medication 650 MILLIGRAM(S): at 16:25

## 2025-03-23 RX ADMIN — Medication 500 MILLILITER(S): at 16:50

## 2025-03-23 RX ADMIN — INSULIN LISPRO 4: 100 INJECTION, SOLUTION INTRAVENOUS; SUBCUTANEOUS at 16:52

## 2025-03-23 RX ADMIN — Medication 81 MILLIGRAM(S): at 10:35

## 2025-03-23 RX ADMIN — INSULIN LISPRO 8: 100 INJECTION, SOLUTION INTRAVENOUS; SUBCUTANEOUS at 12:16

## 2025-03-23 RX ADMIN — OXYCODONE HYDROCHLORIDE 5 MILLIGRAM(S): 30 TABLET ORAL at 11:47

## 2025-03-23 RX ADMIN — Medication 650 MILLIGRAM(S): at 05:16

## 2025-03-23 RX ADMIN — INSULIN LISPRO 6: 100 INJECTION, SOLUTION INTRAVENOUS; SUBCUTANEOUS at 09:11

## 2025-03-23 RX ADMIN — GABAPENTIN 400 MILLIGRAM(S): 400 CAPSULE ORAL at 22:08

## 2025-03-23 RX ADMIN — Medication 650 MILLIGRAM(S): at 17:49

## 2025-03-23 RX ADMIN — RANOLAZINE 500 MILLIGRAM(S): 1000 TABLET, FILM COATED, EXTENDED RELEASE ORAL at 16:52

## 2025-03-23 RX ADMIN — FUROSEMIDE 40 MILLIGRAM(S): 10 INJECTION INTRAMUSCULAR; INTRAVENOUS at 05:15

## 2025-03-23 RX ADMIN — ALBUMIN (HUMAN) 50 MILLILITER(S): 12.5 INJECTION, SOLUTION INTRAVENOUS at 16:52

## 2025-03-23 RX ADMIN — RANOLAZINE 500 MILLIGRAM(S): 1000 TABLET, FILM COATED, EXTENDED RELEASE ORAL at 05:18

## 2025-03-23 RX ADMIN — Medication 400 MILLIGRAM(S): at 18:12

## 2025-03-23 RX ADMIN — ATORVASTATIN CALCIUM 80 MILLIGRAM(S): 80 TABLET, FILM COATED ORAL at 22:08

## 2025-03-23 RX ADMIN — INSULIN GLARGINE-YFGN 26 UNIT(S): 100 INJECTION, SOLUTION SUBCUTANEOUS at 22:09

## 2025-03-23 NOTE — PROGRESS NOTE ADULT - NS ATTEND AMEND GEN_ALL_CORE FT
74F with PMHx HTN, CAD S/P PCI x3 (01/13/2025), HFrEF, CardioMEMS, heart block S/P micra PPM, PAD, CVA, DM, CKD. Patient was sent to [facility] ED from cardiology office with complaints of SOB and lower extremity swelling x3 days.    Non-ST elevation MI (NSTEMI)/ CAD s/p PCI x3 (01/13/2025) -   EKG ventricular paced at 62, unchanged from previous  - Patient endorsing exertional chest pain that improves with rest  - Troponin 906; trend q6h until peak  - Cardiac cath on 01/13/2025 - RCA: Prox 70%, mid 80%, distal 90% stenosis; S/P Rotational atherectomy with RotaPro, IVUS (severe calcium) and PCI with 3 ZEINAB and then 1/22/2025: LAD and LCX appear the same as last time. Culprit was 99% distal RCA instent restenosis. S/p PCI        - s/p LHC x 1 ZEINAB to RCA ISR. switch to brilinta. continue ASA  - TTE from 12/2024 with EF of 39% with multiple segment abnormalities, mild-mod MR, and mild TR  - continue atorvastatin 80 mg daily, Ranolazine 500 mg BID, Imdur 60 mg daily  - Continuous telemetry monitoring.    Acute on Chronic HFrEF LVEF 39 % - LVEDP 17, PAD on her CardioMEMS was 13 (goal 14) today. ? if there was a discrepancy between the [facility] LVEDP WHICH WAS 17 AND Cardiomems PAD of 22 and possibly over diuresis with subsequent BRADY Consider holding Entresto 24/26mg po BID and Lasix IVP stopped  please follow up Cr     Leukocytosis of unknown etiology - WBC 18, and had low grade fever of 100.1-100.2, further work up needed as per primary hospitalist service and may be even consider repeating labs     Becky Pan D.O. [facility]  Cardiology/Vascular Cardiology -[facility] Cardiology   Telephone # [phone].

## 2025-03-23 NOTE — PROGRESS NOTE ADULT - ASSESSMENT
75 y/o female with PMH of HTN, HLD, CAD s/p PCI, HFrEF, Mobitz II s/p PPM (MIRNA Dawn), PAD with chronic right LE wound, CVA, DM2, CKD3 came to the ED complaining of chest pressure. Found to have acute on chronic CHF and NSTEMI in ED.       #NSTEMI   -Troponin:: 906,GUERO: v-paced ,s/p heparin ,Aspirin loaded in the ED  -S/p LHC on 03/22/2024 with PCI to RCA with ZEINAB  -Aspirin 81 mg  -Brillianta  -Continue Statin, Ranolazine 500mg bid   -cont Toprol 100mg   -hold Farxiga and Entresto for now   -cont Lipitor to 80qhs   -IV Lasix 40mg BID as per CHF team  -f/u dr. Bajwa in 1-2 weeks     *Acute on chronic CHFrEF   -Pro-BNP: 73897  - REceived IV lasix. now on hold as per cardiomem data  -Heart failure team is following  -daily weight   -Farxiga 10mg on hold   -Entresto on hold due to BRADY   Oxygen therapy as needed     *BRADY on CKD-3   baseline 1.4-1.8  Now 2.3-2.5  secondary to fluid overload    Monitor renal function   Hold lasix now    #Pyelonephritis  # ESBL Bactremia  -abdominal pain and nausea on 03/22 with fever  -UA posisitve  -BCX  positive for ESBL  -Started on Ceftriaxone 2 gm daily--> Changed to ertapenem  -CT suggestive of right pyelonephrtis  -Follow up on blood and curine culture  -Repeat blood culture on 03/24  -ID Dr. Lam consulted    #HTN/HLD/CAD   Aspirin 81mg   Plavix 75mg   Atorvastatin 80mg   Metoprolol ER 100mg   Ranolazine ER 500mg bid   Imdur 60mg     #DM-2   Lantus 26 units HS, s/p half while NPO   resume lantus 26u qhs   Insulin sliding scale     #Anemia   Likely of chronic disease   Hb stable   Iron tab  Monitor CBC         Dispo- POssible discharge in 2-3 days after improvement SOB, culture report

## 2025-03-23 NOTE — PROGRESS NOTE ADULT - PROBLEM SELECTOR PLAN 1
- EKG ventricular paced at 62, unchanged from previous  - Patient endorsing exertional chest pain that improves with rest  - Troponin 906; trend q6h until peak  - Cardiac cath on 01/13/2025 - RCA: Prox 70%, mid 80%, distal 90% stenosis; S/P Rotational atherectomy with RotaPro, IVUS (severe calcium) and PCI with 3 ZEINAB  - TTE from 12/2024 with EF of 39% with multiple segment abnormalities, mild-mod MR, and mild TR  - s/p LHC x 1 ZEINAB to RCA ISR. switch to brilinta. continue ASA   - continue atorvastatin 80 mg daily, Ranolazine 500 mg BID, Imdur 60 mg daily  - Continuous telemetry monitoring.

## 2025-03-23 NOTE — CONSULT NOTE ADULT - ASSESSMENT
74 F PMH HTN, hyperlipidemia, CAD, CHF s/p CardioMEMS, Mobitz II s/p pacemaker, PAD, CVA, DM2 (A1c 8.5), CKD3, who presented here with chest pressure.     Acute on chronic CHF exacerbation  NSTEMI s/p cardiac cath with ZEINAB to RCA on March 22nd  Course complicated by  Fevers  Leukocytosis  L sided flank pain  Abnormal UA with pyuria and bacteriuria   ESBL E Coli bacteremia   Abnormal CT, reviewed by myself, with evidence of L sided pyelonephritis (enlarged kidney with perinephric stranding)   Severe sepsis     Suggest;  Stop Ceftriaxone IV  Start Ertapenem 500mg IV Q24h   Follow urine culture  Repeat blood cultures in AM to ensure clearance of bacteremia   Monitor fever curve   Trend leukocytosis to improvement   Monitor kidney function daily while on IV antibiotics     Case discussed with pt and her  at bedside via  ID #[id number]. Case also discussed with Dr Padilla.  74 F PMH HTN, hyperlipidemia, CAD, CHF s/p CardioMEMS, Mobitz II s/p pacemaker, PAD, CVA, DM2 (A1c 8.5), CKD3, who presented here with chest pressure.     Acute on chronic CHF exacerbation  NSTEMI s/p cardiac cath with ZEINAB to RCA on March 22nd  Course complicated by  Fevers  Leukocytosis  L sided flank pain  Abnormal UA with pyuria and bacteriuria   ESBL E Coli bacteremia   Abnormal CT, reviewed by myself, with evidence of L sided pyelonephritis (enlarged kidney with perinephric stranding)   Severe sepsis     Patient states she has been having dysuria for a while now  Her UTI likely ascended to L kidney with resultant pyelonephritis / bacteremia / sepsis   I advised her that in the future if she develops dysuria / bladder pain etc she should immediately tell a healthcare provider / go to ED     Suggest;  Stop Ceftriaxone IV  Start Ertapenem 500mg IV Q24h   Follow urine culture  Repeat blood cultures in AM to ensure clearance of bacteremia   Monitor fever curve   Trend leukocytosis to improvement   Monitor kidney function daily while on IV antibiotics     Case discussed with pt and her  at bedside via  ID #[id number]. Case also discussed with Dr Padilla.  74 F PMH HTN, hyperlipidemia, CAD, CHF s/p CardioMEMS, Mobitz II s/p pacemaker, PAD, CVA, DM2 (A1c 8.5), CKD3, who presented here with chest pressure.     Acute on chronic CHF exacerbation  NSTEMI s/p cardiac cath with ZEINAB to RCA on March 22nd  Course complicated by  Fevers  Leukocytosis  L sided flank pain  Abnormal UA with pyuria and bacteriuria   ESBL E Coli bacteremia   Abnormal CT, reviewed by myself, with evidence of L sided pyelonephritis (enlarged kidney with perinephric stranding)   Severe sepsis     Patient states she has been having dysuria for a while now  Her UTI likely ascended to L kidney with resultant pyelonephritis / bacteremia / sepsis   I advised her that in the future if she develops dysuria / bladder pain etc she should immediately notify a healthcare provider     Suggest;  Stop Ceftriaxone IV  Start Ertapenem 500mg IV Q24h   Follow urine culture  Repeat blood cultures in AM to ensure clearance of bacteremia   Monitor fever curve   Trend leukocytosis to improvement   Monitor kidney function daily while on IV antibiotics     Case discussed with pt and her  at bedside via  ID #[id number]. Case also discussed with Dr Padilla.

## 2025-03-23 NOTE — CONSULT NOTE ADULT - SUBJECTIVE AND OBJECTIVE BOX
[facility]  INFECTIOUS DISEASES at [place] / [place] / [place]  =======================================================                              Leo Lam MD                              Professor Emeritus:  Dr Angelo Sebastian MD            Diplomates American Board of Internal Medicine & Infectious Diseases                                   Tel  [phone] Fax [phone]                                  Hospital Consult line:  [phone]  =======================================================    Patient is a 74 F who presents with a chief complaint of chest pressure    HPI:  74 F PMH HTN, hyperlipidemia, CAD, CHF s/p CardioMEMS, Mobitz II s/p pacemaker, PAD, CVA, DM2 (A1c 8.5), CKD3, who presented here with chest pressure.     Here pt was found to have acute on chronic CHF exacerbation as well as NSTEMI. S/p cardiac cath with ZEINAB to RCA March 22nd. Course complicated by fevers, leukocytosis and L sided flank pain. UA with pyuria and bacteriuria. Found to have L sided pyelonephritis on imaging. Course further complicated by ESBL E Coli bacteremia likely from pyelonephritis. ID consulted for recommendations.     REVIEW OF SYSTEMS  Constitutional: + Fever   Skin: No rash   Eyes: No discharge	  ENMT: No sore throat   Respiratory: No cough, no SOB  Cardiovascular: No chest pain   Gastrointestinal: No pain	  Genitourinary: + Dysuria, + L flank pain   Neurological: No AMS     prior hospital charts reviewed [V]  primary team notes reviewed [V]  other consultant notes reviewed [V]    PAST MEDICAL & SURGICAL HISTORY:  DM (diabetes mellitus)  HTN (hypertension)  H/O gastroesophageal reflux (GERD)  Cardiac pacemaker  MICRA for mobitz type 11  CVA (cerebrovascular accident)  resdiual right sided weakness  CVA (cerebrovascular accident)  PAD (peripheral artery disease)  Wound of right leg  History of benign brain tumor  Cataract  History of biopsy  Cardiac pacemaker  S/P angiogram of extremity    SOCIAL HISTORY:  No sick contacts     FAMILY HISTORY:  FH: asthma  Son    Allergies  No Known Allergies    ANTIMICROBIALS:  ertapenem  IVPB 500 every 24 hours    ANTIMICROBIALS (past 90 days):  MEDICATIONS  (STANDING):    cefTRIAXone Injectable.   2000 milliGRAM(s) IV Push (03-22-25 @ 15:20)    OTHER MEDS:   MEDICATIONS  (STANDING):  acetaminophen     Tablet .. 650 every 6 hours PRN  aluminum hydroxide/magnesium hydroxide/simethicone Suspension 30 every 4 hours PRN  aspirin enteric coated 81 daily  atorvastatin 80 at bedtime  dextrose 50% Injectable 25 once  dextrose 50% Injectable 12.5 once  dextrose 50% Injectable 25 once  dextrose Oral Gel 15 once PRN  gabapentin 400 at bedtime  glucagon  Injectable 1 once  HYDROmorphone  Injectable 0.5 once  insulin glargine Injectable (LANTUS) 26 at bedtime  insulin lispro (ADMELOG) corrective regimen sliding scale  three times a day before meals  isosorbide   mononitrate ER Tablet (IMDUR) 60 daily  melatonin 3 at bedtime PRN  metoprolol succinate  daily  ondansetron Injectable 4 every 8 hours PRN  ranolazine 500 two times a day  sacubitril 24 mG/valsartan 26 mG 1 two times a day  ticagrelor 90 two times a day    VITALS:  Vital Signs Last 24 Hrs  T(F): 98.2 (03-23-25 @ 08:30), Max: 101.7 (03-22-25 @ 13:50)    Vital Signs Last 24 Hrs  HR: 70 (03-23-25 @ 08:30) (62 - 72)  BP: 107/61 (03-23-25 @ 08:30) (107/61 - 150/77)  RR: 18 (03-23-25 @ 08:30)  SpO2: 98% (03-23-25 @ 08:30) (95% - 99%)  Wt(kg): --    EXAM:  General: Patient in NAD   HEENT: NCAT  CV: S1+S2  Lungs: No respiratory distress, CTAB  Abd: Soft  + L sided CVA tenderness   Ext: No cyanosis  Neuro: Alert and oriented, no focal deficits  Skin: No rash     Labs:                        10.4   18.49 )-----------( 321      ( 23 Mar 2025 06:15 )             34.3     03-23    127[L]  |  93[L]  |  44.8[H]  ----------------------------<  254[H]  4.4   |  17.0[L]  |  2.83[H]    Ca    7.5[L]      23 Mar 2025 06:15  Phos  2.8     03-23  Mg     2.2     03-23    TPro  7.0  /  Alb  3.1[L]  /  TBili  0.7  /  DBili  x   /  AST  20  /  ALT  11  /  AlkPhos  103  03-23    WBC Trend:  WBC Count: 18.49 (03-23-25 @ 06:15)  WBC Count: 9.03 (03-22-25 @ 04:45)  WBC Count: 8.56 (03-21-25 @ 04:48)  WBC Count: 8.37 (03-20-25 @ 05:30)    Auto Neutrophil #: 5.75 K/uL (03-19-25 @ 22:08)  Auto Neutrophil #: 6.13 K/uL (01-10-25 @ 04:47)  Auto Neutrophil #: 5.82 K/uL (01-07-25 @ 17:16)  Auto Neutrophil #: 8.30 K/uL (12-12-24 @ 04:33)  Auto Neutrophil #: 6.98 K/uL (12-05-24 @ 08:47)    Creatine Trend:  Creatinine: 2.83 (03-23)  Creatinine: 1.63 (03-22)  Creatinine: 1.97 (03-21)  Creatinine: 1.98 (03-20)    Liver Biochemical Testing Trend:  Alanine Aminotransferase (ALT/SGPT): 11 (03-23)  Alanine Aminotransferase (ALT/SGPT): 19 (03-19)  Alanine Aminotransferase (ALT/SGPT): 24 (01-07)  Alanine Aminotransferase (ALT/SGPT): 13 (12-20)  Alanine Aminotransferase (ALT/SGPT): 9 (12-17)  Aspartate Aminotransferase (AST/SGOT): 20 (03-23-25 @ 06:15)  Aspartate Aminotransferase (AST/SGOT): 27 (03-19-25 @ 22:08)  Aspartate Aminotransferase (AST/SGOT): 24 (01-07-25 @ 17:16)  Aspartate Aminotransferase (AST/SGOT): 15 (12-20-24 @ 05:00)  Aspartate Aminotransferase (AST/SGOT): 13 (12-17-24 @ 05:09)  Bilirubin Total: 0.7 (03-23)  Bilirubin Total: 0.9 (03-19)  Bilirubin Total: 0.3 (01-07)  Bilirubin Total: 0.3 (12-20)  Bilirubin Total: 0.3 (12-17)    Urinalysis Basic - ( 23 Mar 2025 06:15 )    Color: x / Appearance: x / SG: x / pH: x  Gluc: 254 mg/dL / Ketone: x  / Bili: x / Urobili: x   Blood: x / Protein: x / Nitrite: x   Leuk Esterase: x / RBC: x / WBC x   Sq Epi: x / Non Sq Epi: x / Bacteria: x    MICROBIOLOGY:    MRSA PCR Result.: NotDetec (03-20-25 @ 13:30)  MRSA PCR Result.: NotDetec (01-13-25 @ 04:00)  MRSA PCR Result.: NotDetec (10-14-24 @ 04:00)  MRSA PCR Result.: NotDetec (07-12-24 @ 17:45)  MRSA PCR Result.: NotDetec (05-10-24 @ 10:10)    Culture - Blood (collected 22 Mar 2025 15:20)  Source: Blood Blood-Peripheral  Preliminary Report:    Growth in aerobic bottle: Gram Negative Rods    Growth in anaerobic bottle: Gram Negative Rods    Direct identification is available within approximately 3-5    hours either by Blood Panel Multiplexed PCR or Direct    MALDI-TOF. Details: [email or web address]  Organism: Blood Culture PCR  Organism: Blood Culture PCR    Sensitivities:      Method Type: PCR      -  Escherichia coli: Detec      -  ESBL: Detec      -  CTX-M Resistance Marker: Detec    Culture - Blood (collected 22 Mar 2025 15:12)  Source: Blood Blood-Peripheral  Preliminary Report:    Growth in aerobic bottle: Gram Negative Rods    Growth in anaerobic bottle: Gram Negative Rods    Culture - Urine (collected 11 Dec 2024 10:52)  Source: Catheterized Catheterized  Final Report:    >100,000 CFU/ml Escherichia coli ESBL  Organism: Escherichia coli ESBL  Organism: Escherichia coli ESBL    Sensitivities:      Method Type: RENNY      -  Ampicillin: R >16 These ampicillin results predict results for amoxicillin      -  Ampicillin/Sulbactam: R >16/8      -  Aztreonam: R >16      -  Cefazolin: R >16 For uncomplicated UTI with K. pneumoniae, E. coli, or P. mirablis: RENNY <=16 is sensitive and RENNY >=32 is resistant. This also predicts results for oral agents cefaclor, cefdinir, cefpodoxime, cefprozil, cefuroxime axetil, cephalexin and locarbef for uncomplicated UTI. Note that some isolates may be susceptible to these agents while testing resistant to cefazolin.      -  Cefepime: R >16      -  Ceftriaxone: R >32      -  Cefuroxime: R >16      -  Ciprofloxacin: R >2      -  Ertapenem: S <=0.5      -  Gentamicin: S <=2      -  Imipenem: S <=1      -  Levofloxacin: R >4      -  Meropenem: S <=1      -  Nitrofurantoin: S <=32 Should not be used to treat pyelonephritis      -  Piperacillin/Tazobactam: R >64      -  Tobramycin: S <=2      -  Trimethoprim/Sulfamethoxazole: R >2/38    Culture - Urine (collected 08 Oct 2024 14:19)  Source: Clean Catch Clean Catch (Midstream)  Final Report:    >100,000 CFU/ml Escherichia coli ESBL  Organism: Escherichia coli ESBL  Organism: Escherichia coli ESBL    Sensitivities:      Method Type: RENNY      -  Ampicillin: R >16 These ampicillin results predict results for amoxicillin      -  Ampicillin/Sulbactam: R >16/8      -  Aztreonam: R >16      -  Cefazolin: R >16 For uncomplicated UTI with K. pneumoniae, E. coli, or P. mirablis: RENNY <=16 is sensitive and RENNY >=32 is resistant. This also predicts results for oral agents cefaclor, cefdinir, cefpodoxime, cefprozil, cefuroxime axetil, cephalexin and locarbef for uncomplicated UTI. Note that some isolates may be susceptible to these agents while testing resistant to cefazolin.      -  Cefepime: R >16      -  Ceftriaxone: R >32      -  Cefuroxime: R >16      -  Ciprofloxacin: R >2      -  Ertapenem: S <=0.5      -  Gentamicin: S <=2      -  Imipenem: S <=1      -  Levofloxacin: R >4      -  Meropenem: S <=1      -  Nitrofurantoin: S <=32 Should not be used to treat pyelonephritis      -  Piperacillin/Tazobactam: S <=8      -  Tobramycin: S <=2      -  Trimethoprim/Sulfamethoxazole: R >2/38    Culture - Blood (collected 10 May 2024 03:37)  Source: .Blood Blood  Final Report:    No growth at 5 days    Culture - Blood (collected 10 May 2024 03:33)  Source: .Blood Blood  Final Report:    No growth at 5 days    Culture - Blood (collected 02 Mar 2024 18:15)  Source: .Blood Blood-Peripheral  Final Report:    No growth at 5 days    Culture - Urine (collected 02 Mar 2024 18:15)  Source: Clean Catch Clean Catch (Midstream)  Final Report:    >100,000 CFU/ml Escherichia coli ESBL  Organism: Escherichia coli ESBL  Organism: Escherichia coli ESBL    Sensitivities:      Method Type: RENNY      -  Amoxicillin/Clavulanic Acid: S <=8/4      -  Ampicillin: R >16 These ampicillin results predict results for amoxicillin      -  Ampicillin/Sulbactam: I 16/8      -  Aztreonam: R 16      -  Cefazolin: R >16 For uncomplicated UTI with K. pneumoniae, E. coli, or P. mirablis: RENNY <=16 is sensitive and RENNY >=32 is resistant. This also predicts results for oral agents cefaclor, cefdinir, cefpodoxime, cefprozil, cefuroxime axetil, cephalexin and locarbef for uncomplicated UTI. Note that some isolates may be susceptible to these agents while testing resistant to cefazolin.      -  Cefepime: R 8      -  Ceftriaxone: R >32      -  Cefuroxime: R >16      -  Ciprofloxacin: R >2      -  Ertapenem: S <=0.5      -  Gentamicin: S <=2      -  Imipenem: S <=1      -  Levofloxacin: R >4      -  Meropenem: S <=1      -  Nitrofurantoin: S <=32 Should not be used to treat pyelonephritis      -  Piperacillin/Tazobactam: S <=8      -  Tobramycin: S <=2      -  Trimethoprim/Sulfamethoxazole: R >2/38    Culture - Blood (collected 02 Mar 2024 18:10)  Source: .Blood Blood-Peripheral  Final Report:    No growth at 5 days    Rapid RVP Result: NotDetec (03-22 @ 14:27)    Troponin T, High Sensitivity Result: 854 (03-20)  Troponin T, High Sensitivity Result: 906 (03-19)    A1C with Estimated Average Glucose Result: 8.5 % (03-22-25 @ 04:45)  A1C with Estimated Average Glucose Result: 7.1 % (10-09-24 @ 04:43)    RADIOLOGY:  imaging below personally reviewed    < from: CT Abdomen and Pelvis No Cont (03.22.25 @ 13:45) >  IMPRESSION:  *  No evidence of retroperitoneal hemorrhage or ileus.  *  Nondependent locules of air in the bladder and left ureter without   perivesicular fat stranding, nonspecific, could be secondary to recent   instrumentation versus cystitis. Recommend clinical correlation.  *  Enlarged, edematous left kidney with increased perinephric fat   stranding and air in the left renal collecting system can be seen in the   setting of pyelonephritis/ureteritis. Evaluation is limited due to   noncontrast technique.     < end of copied text >

## 2025-03-23 NOTE — PROGRESS NOTE ADULT - SUBJECTIVE AND OBJECTIVE BOX
[facility]                                                         CARDIOLOGY AT [address], [place] [address]                                                         Telephone: [phone]. Fax:[phone]                                                                             PROGRESS NOTE      Reason for follow up: heart failure     Review of symptoms:   Cardiac:  No chest pain. No dyspnea. No palpitations.  Respiratory: no cough. No dyspnea  Gastrointestinal: No diarrhea. No abdominal pain. No bleeding.   Neuro: No focal neuro complaints.  All other ROS negative unless otherwise listed above    PHYSICAL EXAM:  Appearance: Comfortable. No acute distress  HEENT:  Atraumatic. Normocephalic.  Normal oral mucosa  Neurologic: A & O x 3, no gross focal deficits.  Cardiovascular: RRR S1 S2, No murmur, no rubs/gallops. No JVD  Respiratory: Lungs clear to auscultation, unlabored   Gastrointestinal:  Soft, Non-tender, + BS  Lower Extremities: 2+ Peripheral Pulses, No clubbing, cyanosis, or edema  Psychiatry: Patient is calm. No agitation.   Skin: warm and dry.    Vitals:  T(C): 36.8 (03-23-25 @ 08:30), Max: 38.7 (03-22-25 @ 13:50)  HR: 70 (03-23-25 @ 08:30) (62 - 72)  BP: 107/61 (03-23-25 @ 08:30) (107/61 - 150/77)  RR: 18 (03-23-25 @ 08:30) (16 - 18)  SpO2: 98% (03-23-25 @ 08:30) (95% - 99%)  Wt(kg): --  I&O's Summary    22 Mar 2025 07:01  -  23 Mar 2025 07:00  --------------------------------------------------------  IN: 330 mL / OUT: 1075 mL / NET: -745 mL    23 Mar 2025 07:01  -  23 Mar 2025 10:55  --------------------------------------------------------  IN: 330 mL / OUT: 0 mL / NET: 330 mL      Weight (kg): 71 (03-19 @ 19:21)    CURRENT CARDIAC MEDICATIONS:  isosorbide   mononitrate ER Tablet (IMDUR) 60 milliGRAM(s) Oral daily  metoprolol succinate  milliGRAM(s) Oral daily  ranolazine 500 milliGRAM(s) Oral two times a day  sacubitril 24 mG/valsartan 26 mG 1 Tablet(s) Oral two times a day      CURRENT OTHER MEDICATIONS:  ertapenem  IVPB 500 milliGRAM(s) IV Intermittent every 24 hours  acetaminophen     Tablet .. 650 milliGRAM(s) Oral every 6 hours PRN Temp greater or equal to 38C (100.4F), Mild Pain (1 - 3)  gabapentin 400 milliGRAM(s) Oral at bedtime  HYDROmorphone  Injectable 0.5 milliGRAM(s) IV Push once  melatonin 3 milliGRAM(s) Oral at bedtime PRN Insomnia  ondansetron Injectable 4 milliGRAM(s) IV Push every 8 hours PRN Nausea and/or Vomiting  oxyCODONE    IR 5 milliGRAM(s) Oral every 6 hours PRN moderate to severe pain  aluminum hydroxide/magnesium hydroxide/simethicone Suspension 30 milliLiter(s) Oral every 4 hours PRN Dyspepsia  atorvastatin 80 milliGRAM(s) Oral at bedtime  dextrose 50% Injectable 25 Gram(s) IV Push once, Stop order after: 1 Doses  dextrose 50% Injectable 12.5 Gram(s) IV Push once, Stop order after: 1 Doses  dextrose 50% Injectable 25 Gram(s) IV Push once, Stop order after: 1 Doses  dextrose Oral Gel 15 Gram(s) Oral once, Stop order after: 1 Doses PRN Blood Glucose LESS THAN 70 milliGRAM(s)/deciliter  glucagon  Injectable 1 milliGRAM(s) IntraMuscular once, Stop order after: 1 Doses  insulin glargine Injectable (LANTUS) 26 Unit(s) SubCutaneous at bedtime  insulin lispro (ADMELOG) corrective regimen sliding scale   SubCutaneous three times a day before meals  aspirin enteric coated 81 milliGRAM(s) Oral daily  chlorhexidine 2% Cloths 1 Application(s) Topical daily  dextrose 5%. 1000 milliLiter(s) (100 mL/Hr) IV Continuous <Continuous>  dextrose 5%. 1000 milliLiter(s) (50 mL/Hr) IV Continuous <Continuous>  ferrous    sulfate 325 milliGRAM(s) Oral daily  ticagrelor 90 milliGRAM(s) Oral two times a day      LABS:	 	                            10.4   18.49 )-----------( 321      ( 23 Mar 2025 06:15 )             34.3     03-23    127[L]  |  93[L]  |  44.8[H]  ----------------------------<  254[H]  4.4   |  17.0[L]  |  2.83[H]    Ca    7.5[L]      23 Mar 2025 06:15  Phos  2.8     03-23  Mg     2.2     03-23    TPro  7.0  /  Alb  3.1[L]  /  TBili  0.7  /  DBili  x   /  AST  20  /  ALT  11  /  AlkPhos  103  03-23    PT/INR/PTT ( 21 Mar 2025 04:48 )                       :                       :      X            :       61.4                  .        .                   .              .           .       X           .                                       Lipid Profile: Date: 03-22 @ 04:45  Total cholesterol 120; Direct LDL: --; HDL: 34; Triglycerides:154    HgA1c:   TSH:

## 2025-03-23 NOTE — PROGRESS NOTE ADULT - SUBJECTIVE AND OBJECTIVE BOX
[facility] Division of Hospital Medicine  Hillary Padilla MD   I'm reachable on [employer] Teams      Patient is a 74y old  Female who presents with a chief complaint of     SUBJECTIVE / OVERNIGHT EVENTS:   Patient was seen and examined during rounds    2/2 blood culture yesterday grew gram negative, concern for ESBL. Urine culture pending  Discussed with ID Dr. Lam  Reports left flank pain.      12 points ROS negative except above    MEDICATIONS  (STANDING):  aspirin enteric coated 81 milliGRAM(s) Oral daily  atorvastatin 80 milliGRAM(s) Oral at bedtime  chlorhexidine 2% Cloths 1 Application(s) Topical daily  dextrose 5%. 1000 milliLiter(s) (100 mL/Hr) IV Continuous <Continuous>  dextrose 5%. 1000 milliLiter(s) (50 mL/Hr) IV Continuous <Continuous>  dextrose 50% Injectable 25 Gram(s) IV Push once  dextrose 50% Injectable 12.5 Gram(s) IV Push once  dextrose 50% Injectable 25 Gram(s) IV Push once  ertapenem  IVPB 500 milliGRAM(s) IV Intermittent every 24 hours  ferrous    sulfate 325 milliGRAM(s) Oral daily  gabapentin 400 milliGRAM(s) Oral at bedtime  glucagon  Injectable 1 milliGRAM(s) IntraMuscular once  HYDROmorphone  Injectable 0.5 milliGRAM(s) IV Push once  insulin glargine Injectable (LANTUS) 26 Unit(s) SubCutaneous at bedtime  insulin lispro (ADMELOG) corrective regimen sliding scale   SubCutaneous three times a day before meals  isosorbide   mononitrate ER Tablet (IMDUR) 60 milliGRAM(s) Oral daily  metoprolol succinate  milliGRAM(s) Oral daily  ranolazine 500 milliGRAM(s) Oral two times a day  sacubitril 24 mG/valsartan 26 mG 1 Tablet(s) Oral two times a day  ticagrelor 90 milliGRAM(s) Oral two times a day    MEDICATIONS  (PRN):  acetaminophen     Tablet .. 650 milliGRAM(s) Oral every 6 hours PRN Temp greater or equal to 38C (100.4F), Mild Pain (1 - 3)  aluminum hydroxide/magnesium hydroxide/simethicone Suspension 30 milliLiter(s) Oral every 4 hours PRN Dyspepsia  dextrose Oral Gel 15 Gram(s) Oral once PRN Blood Glucose LESS THAN 70 milliGRAM(s)/deciliter  melatonin 3 milliGRAM(s) Oral at bedtime PRN Insomnia  ondansetron Injectable 4 milliGRAM(s) IV Push every 8 hours PRN Nausea and/or Vomiting  oxyCODONE    IR 5 milliGRAM(s) Oral every 6 hours PRN moderate to severe pain        I&O's Summary    22 Mar 2025 07:01  -  23 Mar 2025 07:00  --------------------------------------------------------  IN: 330 mL / OUT: 1075 mL / NET: -745 mL    23 Mar 2025 07:01  -  23 Mar 2025 14:23  --------------------------------------------------------  IN: 552 mL / OUT: 100 mL / NET: 452 mL        PHYSICAL EXAM:  Vital Signs Last 24 Hrs  T(C): 36.8 (23 Mar 2025 08:30), Max: 37.9 (23 Mar 2025 00:00)  T(F): 98.2 (23 Mar 2025 08:30), Max: 100.2 (23 Mar 2025 00:00)  HR: 70 (23 Mar 2025 08:30) (62 - 72)  BP: 107/61 (23 Mar 2025 08:30) (107/61 - 150/77)  BP(mean): 76 (22 Mar 2025 22:00) (76 - 80)  RR: 18 (23 Mar 2025 08:30) (16 - 18)  SpO2: 98% (23 Mar 2025 08:30) (95% - 98%)    Parameters below as of 23 Mar 2025 08:30  Patient On (Oxygen Delivery Method): room air        CONSTITUTIONAL: NAD, Not in acute distress  EYES:  EOMI, conjunctiva and sclera clear  ENMT: , neck supple , non-tender  RESPIRATORY: Normal respiratory effort; lungs are clear to auscultation bilaterally  CARDIOVASCULAR: S1S2 present  ABDOMEN: LLQ tenderness  MUSCLOSKELETAL: ; no clubbing or cyanosis of digits;   PSYCH: A+O to person, place, and time; affect appropriate  NEUROLOGY: CN, motor and sensory intact   SKIN: No rashes; no palpable lesions  Extremity: No bilateral edema      LABS:                        10.4   18.49 )-----------( 321      ( 23 Mar 2025 06:15 )             34.3     03-23    127[L]  |  93[L]  |  44.8[H]  ----------------------------<  254[H]  4.4   |  17.0[L]  |  2.83[H]    Ca    7.5[L]      23 Mar 2025 06:15  Phos  2.8     03-23  Mg     2.2     03-23    TPro  7.0  /  Alb  3.1[L]  /  TBili  0.7  /  DBili  x   /  AST  20  /  ALT  11  /  AlkPhos  103  03-23          Urinalysis Basic - ( 23 Mar 2025 06:15 )    Color: x / Appearance: x / SG: x / pH: x  Gluc: 254 mg/dL / Ketone: x  / Bili: x / Urobili: x   Blood: x / Protein: x / Nitrite: x   Leuk Esterase: x / RBC: x / WBC x   Sq Epi: x / Non Sq Epi: x / Bacteria: x        Culture - Blood (collected 22 Mar 2025 15:20)  Source: Blood Blood-Peripheral  Gram Stain (23 Mar 2025 07:17):    Growth in aerobic bottle: Gram Negative Rods    Growth in anaerobic bottle: Gram Negative Rods  Preliminary Report (23 Mar 2025 07:17):    Growth in aerobic bottle: Gram Negative Rods    Growth in anaerobic bottle: Gram Negative Rods    Direct identification is available within approximately 3-5    hours either by Blood Panel Multiplexed PCR or Direct    MALDI-TOF. Details: [email or web address]  Organism: Blood Culture PCR (23 Mar 2025 08:18)  Organism: Blood Culture PCR (23 Mar 2025 08:18)    Culture - Blood (collected 22 Mar 2025 15:12)  Source: Blood Blood-Peripheral  Gram Stain (23 Mar 2025 07:17):    Growth in aerobic bottle: Gram Negative Rods    Growth in anaerobic bottle: Gram Negative Rods  Preliminary Report (23 Mar 2025 07:17):    Growth in aerobic bottle: Gram Negative Rods    Growth in anaerobic bottle: Gram Negative Rods      CAPILLARY BLOOD GLUCOSE      POCT Blood Glucose.: 323 mg/dL (23 Mar 2025 11:53)  POCT Blood Glucose.: 263 mg/dL (23 Mar 2025 09:00)  POCT Blood Glucose.: 236 mg/dL (22 Mar 2025 22:01)  POCT Blood Glucose.: 301 mg/dL (22 Mar 2025 17:31)      Labs reviewed:    RADIOLOGY & ADDITIONAL TESTS:  Imaging Personally Reviewed:  Electrocardiogram Personally Reviewed:

## 2025-03-23 NOTE — PROGRESS NOTE ADULT - ASSESSMENT
74F with PMHx HTN, CAD S/P PCI x3 (01/13/2025), HFrEF, CardioMEMS, heart block S/P micra PPM, PAD, CVA, DM, CKD. Patient was sent to [facility] ED from cardiology office with complaints of SOB and lower extremity swelling x3 days. Patient endorses fever and cough. Swelling was not improving despite increasing PO doses of Lasix at home. CardioMEMS readings were at 24, goal is 14, and patient has also noted weight gain. Patient states she has been experiencing exertional chest pain that improves with rest. Denies headaches, dizziness, palpitations, nausea, or diaphoresis. Denies recent sick contacts or recent travel. Cardiology was consulted for HF exacerbation and patient follows with Dr. Allen. On presentation, initial troponin was 906.

## 2025-03-24 DIAGNOSIS — N17.9 ACUTE KIDNEY FAILURE, UNSPECIFIED: ICD-10-CM

## 2025-03-24 DIAGNOSIS — R78.81 BACTEREMIA: ICD-10-CM

## 2025-03-24 LAB
-  AMPICILLIN/SULBACTAM: SIGNIFICANT CHANGE UP
-  AMPICILLIN: SIGNIFICANT CHANGE UP
-  AZTREONAM: SIGNIFICANT CHANGE UP
-  CEFAZOLIN: SIGNIFICANT CHANGE UP
-  CEFEPIME: SIGNIFICANT CHANGE UP
-  CEFTRIAXONE: SIGNIFICANT CHANGE UP
-  CIPROFLOXACIN: SIGNIFICANT CHANGE UP
-  ERTAPENEM: SIGNIFICANT CHANGE UP
-  GENTAMICIN: SIGNIFICANT CHANGE UP
-  IMIPENEM: SIGNIFICANT CHANGE UP
-  LEVOFLOXACIN: SIGNIFICANT CHANGE UP
-  MEROPENEM: SIGNIFICANT CHANGE UP
-  PIPERACILLIN/TAZOBACTAM: SIGNIFICANT CHANGE UP
-  TOBRAMYCIN: SIGNIFICANT CHANGE UP
-  TRIMETHOPRIM/SULFAMETHOXAZOLE: SIGNIFICANT CHANGE UP
ANION GAP SERPL CALC-SCNC: 14 MMOL/L — SIGNIFICANT CHANGE UP (ref 5–17)
ANION GAP SERPL CALC-SCNC: 16 MMOL/L — SIGNIFICANT CHANGE UP (ref 5–17)
ANION GAP SERPL CALC-SCNC: 16 MMOL/L — SIGNIFICANT CHANGE UP (ref 5–17)
BUN SERPL-MCNC: 58.2 MG/DL — HIGH (ref 8–20)
BUN SERPL-MCNC: 62.3 MG/DL — HIGH (ref 8–20)
BUN SERPL-MCNC: 77.6 MG/DL — HIGH (ref 8–20)
CALCIUM SERPL-MCNC: 6.2 MG/DL — CRITICAL LOW (ref 8.4–10.5)
CALCIUM SERPL-MCNC: 6.3 MG/DL — CRITICAL LOW (ref 8.4–10.5)
CALCIUM SERPL-MCNC: 6.7 MG/DL — LOW (ref 8.4–10.5)
CHLORIDE SERPL-SCNC: 89 MMOL/L — LOW (ref 96–108)
CHLORIDE SERPL-SCNC: 91 MMOL/L — LOW (ref 96–108)
CHLORIDE SERPL-SCNC: 91 MMOL/L — LOW (ref 96–108)
CO2 SERPL-SCNC: 13 MMOL/L — LOW (ref 22–29)
CO2 SERPL-SCNC: 16 MMOL/L — LOW (ref 22–29)
CO2 SERPL-SCNC: 18 MMOL/L — LOW (ref 22–29)
CREAT SERPL-MCNC: 3.5 MG/DL — HIGH (ref 0.5–1.3)
CREAT SERPL-MCNC: 3.54 MG/DL — HIGH (ref 0.5–1.3)
CREAT SERPL-MCNC: 3.66 MG/DL — HIGH (ref 0.5–1.3)
CULTURE RESULTS: ABNORMAL
CULTURE RESULTS: ABNORMAL
EGFR: 13 ML/MIN/1.73M2 — LOW
GLUCOSE BLDC GLUCOMTR-MCNC: 208 MG/DL — HIGH (ref 70–99)
GLUCOSE BLDC GLUCOMTR-MCNC: 208 MG/DL — HIGH (ref 70–99)
GLUCOSE BLDC GLUCOMTR-MCNC: 228 MG/DL — HIGH (ref 70–99)
GLUCOSE BLDC GLUCOMTR-MCNC: 253 MG/DL — HIGH (ref 70–99)
GLUCOSE SERPL-MCNC: 200 MG/DL — HIGH (ref 70–99)
GLUCOSE SERPL-MCNC: 202 MG/DL — HIGH (ref 70–99)
GLUCOSE SERPL-MCNC: 242 MG/DL — HIGH (ref 70–99)
HCT VFR BLD CALC: 27.4 % — LOW (ref 34.5–45)
HGB BLD-MCNC: 9 G/DL — LOW (ref 11.5–15.5)
LACTATE SERPL-SCNC: 1.7 MMOL/L — SIGNIFICANT CHANGE UP (ref 0.5–2)
MAGNESIUM SERPL-MCNC: 2.1 MG/DL — SIGNIFICANT CHANGE UP (ref 1.6–2.6)
MCHC RBC-ENTMCNC: 28.3 PG — SIGNIFICANT CHANGE UP (ref 27–34)
MCHC RBC-ENTMCNC: 32.8 G/DL — SIGNIFICANT CHANGE UP (ref 32–36)
MCV RBC AUTO: 86.2 FL — SIGNIFICANT CHANGE UP (ref 80–100)
METHOD TYPE: SIGNIFICANT CHANGE UP
NRBC # BLD AUTO: 0 K/UL — SIGNIFICANT CHANGE UP (ref 0–0)
NRBC # FLD: 0 K/UL — SIGNIFICANT CHANGE UP (ref 0–0)
NRBC BLD AUTO-RTO: 0 /100 WBCS — SIGNIFICANT CHANGE UP (ref 0–0)
ORGANISM # SPEC MICROSCOPIC CNT: ABNORMAL
ORGANISM # SPEC MICROSCOPIC CNT: ABNORMAL
ORGANISM # SPEC MICROSCOPIC CNT: SIGNIFICANT CHANGE UP
PHOSPHATE SERPL-MCNC: 3.2 MG/DL — SIGNIFICANT CHANGE UP (ref 2.4–4.7)
PLATELET # BLD AUTO: 308 K/UL — SIGNIFICANT CHANGE UP (ref 150–400)
PMV BLD: 10.4 FL — SIGNIFICANT CHANGE UP (ref 7–13)
POTASSIUM SERPL-MCNC: 4.9 MMOL/L — SIGNIFICANT CHANGE UP (ref 3.5–5.3)
POTASSIUM SERPL-MCNC: 5.5 MMOL/L — HIGH (ref 3.5–5.3)
POTASSIUM SERPL-MCNC: 6 MMOL/L — HIGH (ref 3.5–5.3)
POTASSIUM SERPL-SCNC: 4.9 MMOL/L — SIGNIFICANT CHANGE UP (ref 3.5–5.3)
POTASSIUM SERPL-SCNC: 5.5 MMOL/L — HIGH (ref 3.5–5.3)
POTASSIUM SERPL-SCNC: 6 MMOL/L — HIGH (ref 3.5–5.3)
RBC # BLD: 3.18 M/UL — LOW (ref 3.8–5.2)
RBC # FLD: 15.5 % — HIGH (ref 10.3–14.5)
SODIUM SERPL-SCNC: 118 MMOL/L — CRITICAL LOW (ref 135–145)
SODIUM SERPL-SCNC: 121 MMOL/L — LOW (ref 135–145)
SODIUM SERPL-SCNC: 125 MMOL/L — LOW (ref 135–145)
SPECIMEN SOURCE: SIGNIFICANT CHANGE UP
SPECIMEN SOURCE: SIGNIFICANT CHANGE UP
WBC # BLD: 15.8 K/UL — HIGH (ref 3.8–10.5)
WBC # FLD AUTO: 15.8 K/UL — HIGH (ref 3.8–10.5)

## 2025-03-24 PROCEDURE — 99233 SBSQ HOSP IP/OBS HIGH 50: CPT

## 2025-03-24 PROCEDURE — 99223 1ST HOSP IP/OBS HIGH 75: CPT

## 2025-03-24 RX ORDER — SODIUM BICARBONATE 1 MEQ/ML
650 SYRINGE (ML) INTRAVENOUS THREE TIMES A DAY
Refills: 0 | Status: DISCONTINUED | OUTPATIENT
Start: 2025-03-24 | End: 2025-03-24

## 2025-03-24 RX ORDER — MIDODRINE HYDROCHLORIDE 5 MG/1
5 TABLET ORAL EVERY 8 HOURS
Refills: 0 | Status: COMPLETED | OUTPATIENT
Start: 2025-03-24 | End: 2025-03-26

## 2025-03-24 RX ORDER — UREA 40 G
15 VIAL (EA) INTRAVENOUS DAILY
Refills: 0 | Status: DISCONTINUED | OUTPATIENT
Start: 2025-03-24 | End: 2025-03-31

## 2025-03-24 RX ORDER — SODIUM ZIRCONIUM CYCLOSILICATE 5 G/5G
10 POWDER, FOR SUSPENSION ORAL ONCE
Refills: 0 | Status: COMPLETED | OUTPATIENT
Start: 2025-03-24 | End: 2025-03-24

## 2025-03-24 RX ORDER — SODIUM CHLORIDE 3 G/100ML
1000 INJECTION, SOLUTION INTRAVENOUS
Refills: 0 | Status: DISCONTINUED | OUTPATIENT
Start: 2025-03-24 | End: 2025-03-28

## 2025-03-24 RX ORDER — SODIUM BICARBONATE 1 MEQ/ML
1300 SYRINGE (ML) INTRAVENOUS THREE TIMES A DAY
Refills: 0 | Status: DISCONTINUED | OUTPATIENT
Start: 2025-03-24 | End: 2025-03-31

## 2025-03-24 RX ORDER — FUROSEMIDE 10 MG/ML
80 INJECTION INTRAMUSCULAR; INTRAVENOUS ONCE
Refills: 0 | Status: COMPLETED | OUTPATIENT
Start: 2025-03-24 | End: 2025-03-25

## 2025-03-24 RX ORDER — SODIUM ZIRCONIUM CYCLOSILICATE 5 G/5G
10 POWDER, FOR SUSPENSION ORAL ONCE
Refills: 0 | Status: COMPLETED | OUTPATIENT
Start: 2025-03-24 | End: 2025-03-25

## 2025-03-24 RX ORDER — IPRATROPIUM BROMIDE AND ALBUTEROL SULFATE .5; 2.5 MG/3ML; MG/3ML
3 SOLUTION RESPIRATORY (INHALATION) EVERY 6 HOURS
Refills: 0 | Status: DISCONTINUED | OUTPATIENT
Start: 2025-03-24 | End: 2025-03-31

## 2025-03-24 RX ORDER — ACETAMINOPHEN 500 MG/5ML
1000 LIQUID (ML) ORAL ONCE
Refills: 0 | Status: COMPLETED | OUTPATIENT
Start: 2025-03-24 | End: 2025-03-24

## 2025-03-24 RX ORDER — METOPROLOL SUCCINATE 50 MG/1
25 TABLET, EXTENDED RELEASE ORAL
Refills: 0 | Status: DISCONTINUED | OUTPATIENT
Start: 2025-03-24 | End: 2025-03-24

## 2025-03-24 RX ORDER — CALCIUM GLUCONATE 20 MG/ML
1 INJECTION, SOLUTION INTRAVENOUS ONCE
Refills: 0 | Status: COMPLETED | OUTPATIENT
Start: 2025-03-24 | End: 2025-03-25

## 2025-03-24 RX ORDER — CALCIUM GLUCONATE 20 MG/ML
1 INJECTION, SOLUTION INTRAVENOUS ONCE
Refills: 0 | Status: COMPLETED | OUTPATIENT
Start: 2025-03-24 | End: 2025-03-24

## 2025-03-24 RX ADMIN — INSULIN LISPRO 4: 100 INJECTION, SOLUTION INTRAVENOUS; SUBCUTANEOUS at 09:07

## 2025-03-24 RX ADMIN — Medication 325 MILLIGRAM(S): at 09:03

## 2025-03-24 RX ADMIN — INSULIN LISPRO 4: 100 INJECTION, SOLUTION INTRAVENOUS; SUBCUTANEOUS at 17:56

## 2025-03-24 RX ADMIN — ERTAPENEM SODIUM 100 MILLIGRAM(S): 1 INJECTION, POWDER, LYOPHILIZED, FOR SOLUTION INTRAMUSCULAR; INTRAVENOUS at 09:46

## 2025-03-24 RX ADMIN — Medication 1300 MILLIGRAM(S): at 21:14

## 2025-03-24 RX ADMIN — Medication 650 MILLIGRAM(S): at 06:11

## 2025-03-24 RX ADMIN — Medication 400 MILLIGRAM(S): at 18:27

## 2025-03-24 RX ADMIN — RANOLAZINE 500 MILLIGRAM(S): 1000 TABLET, FILM COATED, EXTENDED RELEASE ORAL at 18:06

## 2025-03-24 RX ADMIN — Medication 650 MILLIGRAM(S): at 15:02

## 2025-03-24 RX ADMIN — TICAGRELOR 90 MILLIGRAM(S): 90 TABLET ORAL at 06:10

## 2025-03-24 RX ADMIN — MIDODRINE HYDROCHLORIDE 5 MILLIGRAM(S): 5 TABLET ORAL at 21:13

## 2025-03-24 RX ADMIN — Medication 15 GRAM(S): at 15:03

## 2025-03-24 RX ADMIN — Medication 500 MILLILITER(S): at 13:17

## 2025-03-24 RX ADMIN — GABAPENTIN 400 MILLIGRAM(S): 400 CAPSULE ORAL at 21:14

## 2025-03-24 RX ADMIN — SODIUM CHLORIDE 40 MILLILITER(S): 3 INJECTION, SOLUTION INTRAVENOUS at 16:40

## 2025-03-24 RX ADMIN — INSULIN GLARGINE-YFGN 26 UNIT(S): 100 INJECTION, SOLUTION SUBCUTANEOUS at 21:42

## 2025-03-24 RX ADMIN — Medication 1 APPLICATION(S): at 09:08

## 2025-03-24 RX ADMIN — IPRATROPIUM BROMIDE AND ALBUTEROL SULFATE 3 MILLILITER(S): .5; 2.5 SOLUTION RESPIRATORY (INHALATION) at 21:28

## 2025-03-24 RX ADMIN — Medication 81 MILLIGRAM(S): at 09:03

## 2025-03-24 RX ADMIN — RANOLAZINE 500 MILLIGRAM(S): 1000 TABLET, FILM COATED, EXTENDED RELEASE ORAL at 06:10

## 2025-03-24 RX ADMIN — METOPROLOL SUCCINATE 100 MILLIGRAM(S): 50 TABLET, EXTENDED RELEASE ORAL at 06:10

## 2025-03-24 RX ADMIN — INSULIN LISPRO 6: 100 INJECTION, SOLUTION INTRAVENOUS; SUBCUTANEOUS at 12:51

## 2025-03-24 RX ADMIN — TICAGRELOR 90 MILLIGRAM(S): 90 TABLET ORAL at 18:06

## 2025-03-24 RX ADMIN — ATORVASTATIN CALCIUM 80 MILLIGRAM(S): 80 TABLET, FILM COATED ORAL at 21:14

## 2025-03-24 RX ADMIN — CALCIUM GLUCONATE 100 GRAM(S): 20 INJECTION, SOLUTION INTRAVENOUS at 15:38

## 2025-03-24 RX ADMIN — SODIUM ZIRCONIUM CYCLOSILICATE 10 GRAM(S): 5 POWDER, FOR SUSPENSION ORAL at 16:40

## 2025-03-24 RX ADMIN — Medication 1000 MILLIGRAM(S): at 19:27

## 2025-03-24 RX ADMIN — Medication 650 MILLIGRAM(S): at 07:00

## 2025-03-24 NOTE — CONSULT NOTE ADULT - PROBLEM SELECTOR RECOMMENDATION 2
.  - BNP 78719  - No JVD, no SOB, on room air, no edema; euvolemic on exam  - TTE from 12/2024 with EF of 39% with multiple segment abnormalities, mild-mod MR, and mild TR  - Strict I&Os  - Daily weights, standing if able  - Low sodium diet when able to have PO intake  - Furosemide 40 mg IVP BID  - HOLD Farxiga and Entresto for now  - GDMT: continue with metoprolol 100 mg daily  - Monitor renal function with ongoing diuresis  - Keep K >4 and Mg >2, monitor with labs and replete as needed
- LHC as above, s/p PCI to RCA  - Continue DAPT, statin, Ranexa, and BB

## 2025-03-24 NOTE — CONSULT NOTE ADULT - PROBLEM SELECTOR RECOMMENDATION 9
- Etiology: ICM, LVEF 35-40%.  - Clinically close to euvolemic w/ warm extremities.   - GDMT: Entresto held for BRADY. Toprol XL decreased to 25 mg BID due to hypotension (home medications are Toprol  mg daily, Entresto  mg BID, and Farxiga 10 mg daily). No MRA 2/2 hyperkalemia). Hold Farxiga 2/2 pyelonephritis.   - Diuretics: Continue to hold for now (home dose Lasix 40 mg BID).  - Please document strict I&Os and daily standing weight.  - Will continue to monitor her CardioMEMS readings. Goal PAD 14.   - Device: s/p Micra PPM with chronic . Can consider device upgrade to CRT-P in the future. - Etiology: ICM, LVEF 35-40%.  - Clinically close to euvolemic w/ warm extremities.   - GDMT: Entresto held for BRADY. Will hold metoprolol given hypotension (home medications are Toprol  mg daily, Entresto  mg BID, and Farxiga 10 mg daily). No MRA 2/2 hyperkalemia). Hold Farxiga 2/2 pyelonephritis.   - Diuretics: Continue to hold for now (home dose Lasix 40 mg BID).  - Please document strict I&Os and daily standing weight.  - Will continue to monitor her CardioMEMS readings. Goal PAD 14.   - Device: s/p Micra PPM with chronic . Can consider device upgrade to CRT-P in the future as an outpatient.  - Check lactate. Low threshold for MICU consult if worsening BRADY or persistent hypotension.

## 2025-03-24 NOTE — CHART NOTE - NSCHARTNOTEFT_GEN_A_CORE
reviewed repeat bmp ordered by nephro  d/w nephro,  planned for 2% saline  received 500cc saline for hypotension  bp meds adjusted  d/w cardio, requesting micu consult  transfer to stepdown in meantime reviewed repeat bmp ordered by nephro  1g ca gluc ordered  d/w nephro,  planned for 2% saline  received 500cc saline for hypotension  bp meds adjusted  d/w cardio, requesting micu consult  transfer to stepdown in meantime

## 2025-03-24 NOTE — CONSULT NOTE ADULT - PROBLEM SELECTOR RECOMMENDATION 4
- On 3/22 patient developed fever and leukocytosis.   - Blood cultures were positive for E.Coli and CT A/P showed pyelonephritis  - Continue abx per primary team.

## 2025-03-24 NOTE — PROGRESS NOTE ADULT - SUBJECTIVE AND OBJECTIVE BOX
seen for bacteremia, nstemi, arf    feels weak  poor po intake  no chest pain/sob  bedside     MEDICATIONS  (STANDING):  aspirin enteric coated 81 milliGRAM(s) Oral daily  atorvastatin 80 milliGRAM(s) Oral at bedtime  chlorhexidine 2% Cloths 1 Application(s) Topical daily  dextrose 5%. 1000 milliLiter(s) (100 mL/Hr) IV Continuous <Continuous>  dextrose 5%. 1000 milliLiter(s) (50 mL/Hr) IV Continuous <Continuous>  dextrose 50% Injectable 25 Gram(s) IV Push once  dextrose 50% Injectable 12.5 Gram(s) IV Push once  dextrose 50% Injectable 25 Gram(s) IV Push once  ertapenem  IVPB 500 milliGRAM(s) IV Intermittent every 24 hours  ferrous    sulfate 325 milliGRAM(s) Oral daily  gabapentin 400 milliGRAM(s) Oral at bedtime  glucagon  Injectable 1 milliGRAM(s) IntraMuscular once  HYDROmorphone  Injectable 0.5 milliGRAM(s) IV Push once  insulin glargine Injectable (LANTUS) 26 Unit(s) SubCutaneous at bedtime  insulin lispro (ADMELOG) corrective regimen sliding scale   SubCutaneous three times a day before meals  metoprolol succinate  milliGRAM(s) Oral daily  ranolazine 500 milliGRAM(s) Oral two times a day  ticagrelor 90 milliGRAM(s) Oral two times a day    MEDICATIONS  (PRN):  acetaminophen     Tablet .. 650 milliGRAM(s) Oral every 6 hours PRN Temp greater or equal to 38C (100.4F), Mild Pain (1 - 3)  aluminum hydroxide/magnesium hydroxide/simethicone Suspension 30 milliLiter(s) Oral every 4 hours PRN Dyspepsia  dextrose Oral Gel 15 Gram(s) Oral once PRN Blood Glucose LESS THAN 70 milliGRAM(s)/deciliter  melatonin 3 milliGRAM(s) Oral at bedtime PRN Insomnia  ondansetron Injectable 4 milliGRAM(s) IV Push every 8 hours PRN Nausea and/or Vomiting  oxyCODONE    IR 5 milliGRAM(s) Oral every 6 hours PRN moderate to severe pain      Allergies    No Known Allergies         Vital Signs Last 24 Hrs  T(C): 36.9 (24 Mar 2025 08:31), Max: 38.2 (23 Mar 2025 17:49)  T(F): 98.4 (24 Mar 2025 08:31), Max: 100.8 (23 Mar 2025 17:49)  HR: 73 (24 Mar 2025 08:31) (66 - 78)  BP: 105/57 (24 Mar 2025 08:31) (97/58 - 108/57)  BP(mean): 69 (24 Mar 2025 06:08) (69 - 72)  RR: 18 (24 Mar 2025 08:31) (17 - 18)  SpO2: 98% (24 Mar 2025 08:31) (93% - 98%)    Parameters below as of 24 Mar 2025 08:31  Patient On (Oxygen Delivery Method): room air        PHYSICAL EXAM:    GENERAL: NAD    CHEST/LUNG: Clear to ausculation bilaterally;    HEART: Regular rate and rhythm; S1 S2;  ABDOMEN: Soft, ,  ; Bowel sounds present  EXTREMITIES: no edema   NERVOUS SYSTEM:  Alert & Oriented X3, Motor Strength 5/5 B/L upper and lower extremities      LABS:                        9.0    15.80 )-----------( 308      ( 24 Mar 2025 05:20 )             27.4     03-24    125[L]  |  91[L]  |  58.2[H]  ----------------------------<  202[H]  4.9   |  18.0[L]  |  3.54[H]    Ca    6.7[L]      24 Mar 2025 05:20  Phos  3.2     03-24  Mg     2.1     03-24    TPro  7.0  /  Alb  3.1[L]  /  TBili  0.7  /  DBili  x   /  AST  20  /  ALT  11  /  AlkPhos  103  03-23      Urinalysis Basic - ( 24 Mar 2025 05:20 )    Color: x / Appearance: x / SG: x / pH: x  Gluc: 202 mg/dL / Ketone: x  / Bili: x / Urobili: x   Blood: x / Protein: x / Nitrite: x   Leuk Esterase: x / RBC: x / WBC x   Sq Epi: x / Non Sq Epi: x / Bacteria: x        CAPILLARY BLOOD GLUCOSE      POCT Blood Glucose.: 208 mg/dL (24 Mar 2025 09:03)  POCT Blood Glucose.: 199 mg/dL (23 Mar 2025 22:07)  POCT Blood Glucose.: 215 mg/dL (23 Mar 2025 16:27)  POCT Blood Glucose.: 323 mg/dL (23 Mar 2025 11:53)        RADIOLOGY & ADDITIONAL TESTS:

## 2025-03-24 NOTE — CONSULT NOTE ADULT - SUBJECTIVE AND OBJECTIVE BOX
Assessment/plan:     74-year-old female with history of hypertension dyslipidemia coronary artery disease s/p PCI heart failure with reduced EF Mobitz type II status post Micra PAD chronic right lower extremity wound CVA diabetes CKD stage III presented with chest pressure admitted with NSTEMI now hospital course complicated by UTI with ESBL E. coli bacteremia as well as acute encephalopathy, acute kidney injury with hyponatremia hyperkalemia hypocalcemia  as well as hypotension    Patient seen and examined  CODE STATUS: full code  Plan of care discussed with: patient's  and family members at bedside besides primary team and cardiology      Recommend:  S/p 500 cc of normal saline bolus with appropriate blood pressure response   recommend low-dose midodrine 5 mg 3 times daily for 24 to 48 hours  Saturating well on supplemental nasal cannula, recommended as needed DuoNeb first dose now   agree with low-dose 2% saline infusion with close monitoring of sodium as well as respiratory status (appears dry for now)  Ertapenem IV daily (to be adjusted based on renal function)  Avoid Nephrotoxic agents; Adjust medications for renal  function; Close urinary output monitoring  Further management of BRADY as per nephrology  Agree holding off of diuretics for now  Coronary artery disease/heart failure management as per cardiology     patient's lactic acid is normal and has responded to IV fluid for now still can go to stepdown unit bed next 24 to 48 hours will decide which direction patient is headed and hence agree with closer monitoring in the stepdown unit for now    _________________________________    Chief complaint:   Chest pressure fever body aches abnormal heartbeat     HPI/Hospital course: 74-year-old female with history of hypertension dyslipidemia coronary artery disease s/p PCI heart failure with reduced EF Mobitz type II status post Micra PAD chronic right lower extremity wound CVA diabetes CKD stage III presented with chest pressure admitted with NSTEMI now hospital course complicated by UTI with ESBL E. coli bacteremia as well as acute encephalopathy, acute kidney injury with hyponatremia hyperkalemia hypocalcemia  as well as hypotension   s/p left heart cath on March 21 showed mid distal LAD severe stenosis OM1 and OM 2 severe stenosis s/p PCI with drug-eluting stent x 1 and patient was diuresed through the hospital course which is currently being held  During my assessment patient mentioned that her entire body is hurting  Denied any nausea vomiting does have palpitation difficulty breathing  Denied any wheezing cough chest pain dizziness loss of consciousness new tingling or weakness loss of vision or taste    Review of systems:  All systems reviewed with symptoms mentions as above.     Past medical/surgical history: hypertension dyslipidemia coronary artery disease status post PCI heart failure with reduced EF morbid's type II PPM; multiple UTIs in the past    Family history: noncontributory pertaining to presenting illness    Social history: denied alcohol tobacco recreational substance abuse; lives with her     Allergies:   no known drug allergies    Home Medications:  gabapentin 800 mg oral tablet: 0.5 tab(s) orally once a day (14 Jan 2025 11:55)  insulin glargine 100 units/mL subcutaneous solution: 26 unit(s) subcutaneous once a day (at bedtime) (09 Jan 2025 14:25)  metoprolol succinate 100 mg oral tablet, extended release: 1 tab(s) orally once a day (09 Jan 2025 14:25)  ranolazine 500 mg oral tablet, extended release: 1 tab(s) orally 2 times a day (09 Jan 2025 14:25)    Vital Signs Last 24 Hrs  T(C): 36.6 (24 Mar 2025 17:09), Max: 38.2 (23 Mar 2025 17:49)  T(F): 97.9 (24 Mar 2025 17:09), Max: 100.8 (23 Mar 2025 17:49)  HR: 62 (24 Mar 2025 17:09) (62 - 78)  BP: 122/65 (24 Mar 2025 17:09) (88/40 - 122/65)  BP(mean): 83 (24 Mar 2025 17:09) (69 - 83)  RR: 20 (24 Mar 2025 17:09) (17 - 20)  SpO2: 95% (24 Mar 2025 17:09) (94% - 98%)    Physical exam:   74-year-old female lethargic but easily arousable to verbal commands and responding all commands with no acute motor deficit or sensory deficit with chronic facial droop on the right side from previous stroke, pupil equally round reactive with no conjunctival pallor oral mucosa dry with no posterior pharyngeal erythema or cervical lymphadenopathy air entry equal bilaterally with expiratory wheezing, S1-S2 heard regular rate rhythm soft abdomen no CVA tenderness no suprapubic tenderness bowel sounds present peripheries warm 2+ pedal pulses with no pitting edema    Labs:  reviewed  Imaging:  reviewed  Cultures/pathology: reviewed          Plan of care discussed with patient and primary team  Thank you for your consult.   Please call us back with any worsening clinical status or with concerns & questions.   We will Sign Off for now.     Min David MD   Division of Critical Care Medicine  Department of Medicine   [facility]   Cell [phone]     I have personally provided 70 minutes of critical care time excluding time spent on separate procedures

## 2025-03-24 NOTE — PROGRESS NOTE ADULT - ASSESSMENT
75 y/o female with PMH of HTN, HLD, CAD s/p PCI, HFrEF, Mobitz II s/p PPM (MD NereydaT), PAD with chronic right LE wound, CVA, DM2, CKD3 came to the ED complaining of chest pressure. Found to have acute on chronic CHF and NSTEMI in ED.     hyponatremia  arf on ckd3     diuretics on hold     got contrast with cath      renal consulted     hold farxiga/entresto    #NSTEMI   -Troponin:: 906,GUERO: v-paced ,s/p heparin ,Aspirin loaded in the ED  -S/p LHC on 03/22/2024 with PCI to RCA with ZEINAB  -Aspirin 81 mg  -Brilinta  -Continue Statin, Ranolazine 500mg bid   -cont Toprol 100mg   -hold Farxiga and Entresto for now   -cont Lipitor to 80qhs   -f/u dr. Bajwa in 1-2 weeks     #acute on chronic CHFrEF   ##HTN/HLD/CAD   -Pro-BNP: 16469  - REceived IV lasix. now on hold as per cardiomem data  -Heart failure team is following  -daily weight   -Farxiga 10mg on hold   -Entresto on hold due to BRADY     #Pyelonephritis  # ESBL Bacteremia  -BCX  positive for ESBL  -Started on Ceftriaxone 2 gm daily--> Changed to ertapenem  -CT suggestive of right pyelonephritis  -Repeat blood culture on 03/24  -ID following       #DM-2   Lantus 26 units HS  Insulin sliding scale     #Anemia   Likely of chronic disease   Hb stable   Iron tab  Monitor CBC         Dispo- PT eval, once renal function improved and resolved bacteremia.

## 2025-03-24 NOTE — CONSULT NOTE ADULT - SUBJECTIVE AND OBJECTIVE BOX
HPI:  73 y/o female with PMH of HTN, HLD, CAD s/p PCI, HFrEF, Mobitz II s/p PPM (Nereyda, MDT), PAD with chronic right LE wound, CVA, DM2, CKD3 came to the ED complaining of chest pressure. Patient reported midsternal pressure, non-radiating going for couple of days. As per granddaughter (Lexi) at bedside, patient has been complaining of cough (dry), fever and body aches x 3 days. Lexi also said she got a call from cardiology saying to increase her to Lasix 40mg to tid (from bid) because her CardioMems was not at goal; which she has been doing for the past 2 days. She went to see her PCP yesterday for the fever and body ache, noted to have weight gain, PCP spoke to cardiology who advise to come to the ED. Patient has no shortness of breath, palpitation, nausea, vomiting, abdominal pain, change in bowel/urinary habit, recent travel, sick contact.       PAST MEDICAL & SURGICAL HISTORY:  DM (diabetes mellitus)      HTN (hypertension)      H/O gastroesophageal reflux (GERD)      Cardiac pacemaker  MICRA for mobitz type 11      CVA (cerebrovascular accident)  resdiual right sided weakness      CVA (cerebrovascular accident)      PAD (peripheral artery disease)      Wound of right leg      History of benign brain tumor      Cataract      History of biopsy      Cardiac pacemaker      S/P angiogram of extremity      FAMILY HISTORY:  FH: asthma  Son    [place]    Social History:Non smoker    MEDICATIONS  (STANDING):  aspirin enteric coated 81 milliGRAM(s) Oral daily  atorvastatin 80 milliGRAM(s) Oral at bedtime  chlorhexidine 2% Cloths 1 Application(s) Topical daily  dextrose 5%. 1000 milliLiter(s) (100 mL/Hr) IV Continuous <Continuous>  dextrose 5%. 1000 milliLiter(s) (50 mL/Hr) IV Continuous <Continuous>  dextrose 50% Injectable 25 Gram(s) IV Push once  dextrose 50% Injectable 12.5 Gram(s) IV Push once  dextrose 50% Injectable 25 Gram(s) IV Push once  ertapenem  IVPB 500 milliGRAM(s) IV Intermittent every 24 hours  ferrous    sulfate 325 milliGRAM(s) Oral daily  gabapentin 400 milliGRAM(s) Oral at bedtime  glucagon  Injectable 1 milliGRAM(s) IntraMuscular once  HYDROmorphone  Injectable 0.5 milliGRAM(s) IV Push once  insulin glargine Injectable (LANTUS) 26 Unit(s) SubCutaneous at bedtime  insulin lispro (ADMELOG) corrective regimen sliding scale   SubCutaneous three times a day before meals  metoprolol succinate  milliGRAM(s) Oral daily  ranolazine 500 milliGRAM(s) Oral two times a day  ticagrelor 90 milliGRAM(s) Oral two times a day    MEDICATIONS  (PRN):  acetaminophen     Tablet .. 650 milliGRAM(s) Oral every 6 hours PRN Temp greater or equal to 38C (100.4F), Mild Pain (1 - 3)  aluminum hydroxide/magnesium hydroxide/simethicone Suspension 30 milliLiter(s) Oral every 4 hours PRN Dyspepsia  dextrose Oral Gel 15 Gram(s) Oral once PRN Blood Glucose LESS THAN 70 milliGRAM(s)/deciliter  melatonin 3 milliGRAM(s) Oral at bedtime PRN Insomnia  ondansetron Injectable 4 milliGRAM(s) IV Push every 8 hours PRN Nausea and/or Vomiting  oxyCODONE    IR 5 milliGRAM(s) Oral every 6 hours PRN moderate to severe pain   Meds reviewed      Vital Signs Last 24 Hrs  T(C): 36.9 (24 Mar 2025 08:31), Max: 38.2 (23 Mar 2025 17:49)  T(F): 98.4 (24 Mar 2025 08:31), Max: 100.8 (23 Mar 2025 17:49)  HR: 73 (24 Mar 2025 08:31) (66 - 78)  BP: 105/57 (24 Mar 2025 08:31) (97/58 - 108/57)  BP(mean): 69 (24 Mar 2025 06:08) (69 - 72)  RR: 18 (24 Mar 2025 08:31) (17 - 18)  SpO2: 98% (24 Mar 2025 08:31) (93% - 98%)    Parameters below as of 24 Mar 2025 08:31  Patient On (Oxygen Delivery Method): room air      Daily     Daily Weight in k.3 (24 Mar 2025 05:50)    PHYSICAL EXAM:  GENERAL: appears chronically ill  HEAD:  NCAT  EYES: EOMI  NECK: Supple  NERVOUS SYSTEM:  Alert & Oriented X3  CHEST/LUNG: Clear to percussion bilaterally  HEART: Regular rate and rhythm  ABDOMEN: Soft, Nontender, Nondistended; +BS  EXTREMITIES:  edema    LABS:                        9.0    15.80 )-----------( 308      ( 24 Mar 2025 05:20 )             27.4     -    125[L]  |  91[L]  |  58.2[H]  ----------------------------<  202[H]  4.9   |  18.0[L]  |  3.54[H]    Ca    6.7[L]      24 Mar 2025 05:20  Phos  3.2       Mg     2.1         TPro  7.0  /  Alb  3.1[L]  /  TBili  0.7  /  DBili  x   /  AST  20  /  ALT  11  /  AlkPhos  103        Urinalysis Basic - ( 24 Mar 2025 05:20 )    Color: x / Appearance: x / SG: x / pH: x  Gluc: 202 mg/dL / Ketone: x  / Bili: x / Urobili: x   Blood: x / Protein: x / Nitrite: x   Leuk Esterase: x / RBC: x / WBC x   Sq Epi: x / Non Sq Epi: x / Bacteria: x      Magnesium: 2.1 mg/dL ( @ 05:20)  Phosphorus: 3.2 mg/dL ( @ 05:20)          RADIOLOGY & ADDITIONAL TESTS:   HPI:  73 y/o female with PMH of HTN, HLD, CAD s/p PCI, HFrEF, Mobitz II s/p PPM (Nereyda, MDT), PAD with chronic right LE wound, CVA, DM2, CKD3 came to the ED complaining of chest pressure. Patient reported midsternal pressure, non-radiating going for couple of days. As per granddaughter (Lexi) at bedside, patient has been complaining of cough (dry), fever and body aches x 3 days. Lexi also said she got a call from cardiology saying to increase her to Lasix 40mg to tid (from bid) because her CardioMems was not at goal; which she has been doing for the past 2 days. She went to see her PCP yesterday for the fever and body ache, noted to have weight gain, PCP spoke to cardiology who advise to come to the ED. Patient has no shortness of breath, palpitation, nausea, vomiting, abdominal pain, change in bowel/urinary habit, recent travel, sick contact.       PAST MEDICAL & SURGICAL HISTORY:  DM (diabetes mellitus)      HTN (hypertension)      H/O gastroesophageal reflux (GERD)      Cardiac pacemaker  MICRA for mobitz type 11      CVA (cerebrovascular accident)  resdiual right sided weakness      CVA (cerebrovascular accident)      PAD (peripheral artery disease)      Wound of right leg      History of benign brain tumor      Cataract      History of biopsy      Cardiac pacemaker      S/P angiogram of extremity      FAMILY HISTORY:  FH: asthma  Son    [place]    Social History:Non smoker    MEDICATIONS  (STANDING):  aspirin enteric coated 81 milliGRAM(s) Oral daily  atorvastatin 80 milliGRAM(s) Oral at bedtime  chlorhexidine 2% Cloths 1 Application(s) Topical daily  dextrose 5%. 1000 milliLiter(s) (100 mL/Hr) IV Continuous <Continuous>  dextrose 5%. 1000 milliLiter(s) (50 mL/Hr) IV Continuous <Continuous>  dextrose 50% Injectable 25 Gram(s) IV Push once  dextrose 50% Injectable 12.5 Gram(s) IV Push once  dextrose 50% Injectable 25 Gram(s) IV Push once  ertapenem  IVPB 500 milliGRAM(s) IV Intermittent every 24 hours  ferrous    sulfate 325 milliGRAM(s) Oral daily  gabapentin 400 milliGRAM(s) Oral at bedtime  glucagon  Injectable 1 milliGRAM(s) IntraMuscular once  HYDROmorphone  Injectable 0.5 milliGRAM(s) IV Push once  insulin glargine Injectable (LANTUS) 26 Unit(s) SubCutaneous at bedtime  insulin lispro (ADMELOG) corrective regimen sliding scale   SubCutaneous three times a day before meals  metoprolol succinate  milliGRAM(s) Oral daily  ranolazine 500 milliGRAM(s) Oral two times a day  ticagrelor 90 milliGRAM(s) Oral two times a day    MEDICATIONS  (PRN):  acetaminophen     Tablet .. 650 milliGRAM(s) Oral every 6 hours PRN Temp greater or equal to 38C (100.4F), Mild Pain (1 - 3)  aluminum hydroxide/magnesium hydroxide/simethicone Suspension 30 milliLiter(s) Oral every 4 hours PRN Dyspepsia  dextrose Oral Gel 15 Gram(s) Oral once PRN Blood Glucose LESS THAN 70 milliGRAM(s)/deciliter  melatonin 3 milliGRAM(s) Oral at bedtime PRN Insomnia  ondansetron Injectable 4 milliGRAM(s) IV Push every 8 hours PRN Nausea and/or Vomiting  oxyCODONE    IR 5 milliGRAM(s) Oral every 6 hours PRN moderate to severe pain   Meds reviewed      Vital Signs Last 24 Hrs  T(C): 36.9 (24 Mar 2025 08:31), Max: 38.2 (23 Mar 2025 17:49)  T(F): 98.4 (24 Mar 2025 08:31), Max: 100.8 (23 Mar 2025 17:49)  HR: 73 (24 Mar 2025 08:31) (66 - 78)  BP: 105/57 (24 Mar 2025 08:31) (97/58 - 108/57)  BP(mean): 69 (24 Mar 2025 06:08) (69 - 72)  RR: 18 (24 Mar 2025 08:31) (17 - 18)  SpO2: 98% (24 Mar 2025 08:31) (93% - 98%)    Parameters below as of 24 Mar 2025 08:31  Patient On (Oxygen Delivery Method): room air      Daily     Daily Weight in k.3 (24 Mar 2025 05:50)    PHYSICAL EXAM:  GENERAL: appears acutely and chronically ill  HEAD:  NCAT  EYES: EOMI  NECK: Supple  NERVOUS SYSTEM:  Alert & Oriented X3  CHEST/LUNG: Clear to percussion bilaterally  HEART: Regular rate and rhythm  ABDOMEN: Soft, Nontender, Nondistended; +BS  EXTREMITIES:  trace edema    LABS:                        9.0    15.80 )-----------( 308      ( 24 Mar 2025 05:20 )             27.4     03-    125[L]  |  91[L]  |  58.2[H]  ----------------------------<  202[H]  4.9   |  18.0[L]  |  3.54[H]    Ca    6.7[L]      24 Mar 2025 05:20  Phos  3.2       Mg     2.1         TPro  7.0  /  Alb  3.1[L]  /  TBili  0.7  /  DBili  x   /  AST  20  /  ALT  11  /  AlkPhos  103        Urinalysis Basic - ( 24 Mar 2025 05:20 )    Color: x / Appearance: x / SG: x / pH: x  Gluc: 202 mg/dL / Ketone: x  / Bili: x / Urobili: x   Blood: x / Protein: x / Nitrite: x   Leuk Esterase: x / RBC: x / WBC x   Sq Epi: x / Non Sq Epi: x / Bacteria: x      Magnesium: 2.1 mg/dL ( @ 05:20)  Phosphorus: 3.2 mg/dL ( @ 05:20)          RADIOLOGY & ADDITIONAL TESTS:

## 2025-03-24 NOTE — CONSULT NOTE ADULT - NS ATTEND AMEND GEN_ALL_CORE FT
-    74F hx HTN, CAD S/P PCI x3 (01/13/2025), HFrEF, CardioMEMS, heart block S/P micra PPM, PAD, CVA, DM, CKD. Patient was sent to [facility] ED from cardiology office with complaints of SOB and lower extremity swelling x3 days. Patient endorses fever and cough. Swelling was not improving despite increasing PO doses of Lasix at home. CardioMEMS readings were at 24, goal is 14, and patient has also noted weight gain. Patient states she has been experiencing exertional chest pain that improves with rest. Denies headaches, dizziness, palpitations, nausea, or diaphoresis. Denies recent sick contacts or recent travel. Cardiology was consulted for HF exacerbation and patient follows with Dr. Allen. On presentation, initial troponin was 906.    NSTEMI   - EKG ventricular paced at 62, unchanged from previous  - admits chest pain that improves with rest  - Troponin 906; trend q6h until peak  - Cardiac cath on 01/13/2025 - RCA: Prox 70%, mid 80%, distal 90% stenosis; S/P Rotational atherectomy with RotaPro, IVUS (severe calcium) and PCI with 3 ZEINAB  - TTE from 12/2024 with EF of 39% with multiple segment abnormalities, mild-mod MR, and mild TR  - IV Heparin, cont meds  - [facility] planned     Acute on chronic HFrEF  - BNP 20322  - TTE from 12/2024 with EF of 39% with multiple segment abnormalities, mild-mod MR, and mild TR  - IV diretics  - HOLD Farxiga and Entresto for now   - GDMT: continue with metoprolol 100 mg daily
Ms. Ko is a 75 y/o Irish speaking female with PMH of ICM/HFrEF (LVEF 35-40%), CAD s/p PCI, Mobitz II s/p PPM (MIRNA Dawn), HTN, HLD, PAD with chronic right LE wound, DM2, and CKD3, who presented to the ED on 3/19 with c/o CP, found to have NSTEMI. She underwent PCI to her RCA for ISR on Friday 3/21/25. Over the next couple of days she developed fever, leukocytosis and worsening renal function. Her blood cultures are positive for E.coli and CT abdomen is concerning for left pyelonephritis.     Continue to hold home HF medications in the setting of sepsis with hypotension. Agree with fluid resuscitation. She is on room air and not in acute distress despite cardiomems PAD being higher than goal. Hold diuretics. MICU evaluation for higher level of care. Antibiotics and management of sepsis per primary. DAPT for recent PCI.

## 2025-03-24 NOTE — CONSULT NOTE ADULT - ASSESSMENT
73 y/o female with PMH of HTN, HLD, CAD s/p PCI, HFrEF, Mobitz II s/p PPM (MD NereydaT), PAD with chronic right LE wound, CVA, DM2, CKD3 came to the ED complaining of chest pressure/ SOB    BRADY on CKD suspect stage III   Suspect SALO   S/p cardiac cath on 03/22 PCI to RCA with ZEINAB  Serum Cr 1.6 on 3/22 baseline seems to be 1.5- 1.8  Low BP since admission - ATN?- likely from sepsis w + blood Cx   Has h/o poorly controlled DM likely etiology of CKD  UA w + hematuria/ proteinuria  CT abd noted:  No hydronephrosis - Enlarged edematous L kidney w increased perinephric fat stranding- Likely Pyelo  Noted + Blood Cx--> ESBL Bacteremia--> on IV Abx --> Ertapenem  LVEF 31% on 3/21/25  Agree w holding Farxiga and Entresto for now    Systolic HF exacerbation at admit s/p IV Lasix, diuretics now on hold     Hypochloridemia likely from diuretics   HypoCalcemia w low albumin corrects to approx 7.4 mg/ dL     HypoNatremia  Serum Na 127--> 125  Urine studies not so helpful w recent diuretics  Will start Urea powder first dose now  Caution w correction more than 6-8 mEq/ L per 24 hr period   Added fluid restriction to diet    MA + CKD  added NaBicarb po    Anemia + CKD check iron studies    repeat BMP at 2 PM    Renally dose meds, avoid nephrotoxic agents   Monitor labs, will follow 73 y/o female with PMH of HTN, HLD, CAD s/p PCI, HFrEF, Mobitz II s/p PPM (MD NereydaT), PAD with chronic right LE wound, CVA, DM2, CKD3 came to the ED complaining of chest pressure/ SOB    BRADY on CKD suspect stage III   Suspect SALO   S/p cardiac cath on 03/22 PCI to RCA with ZEINAB  Serum Cr 1.6 on 3/22 baseline seems to be 1.5- 1.8  Low BP since admission - ATN?- likely from sepsis w + blood Cx   Has h/o poorly controlled DM likely etiology of CKD  UA w + hematuria/ proteinuria  CT abd noted:  No hydronephrosis - Enlarged edematous L kidney w increased perinephric fat stranding- Likely Pyelo  Noted + Blood Cx--> ESBL Bacteremia--> on IV Abx --> Ertapenem  LVEF 31% on 3/21/25  Agree w holding Farxiga and Entresto for now    Systolic HF exacerbation at admit s/p IV Lasix, diuretics now on hold     Hypochloridemia likely from diuretics   HypoCalcemia w low albumin corrects to approx 7.4 mg/ dL check PTH, Vit D    HypoNatremia  Serum Na 127--> 125  Urine studies not so helpful w recent diuretics  Will start Urea powder first dose now  Caution w correction more than 6-8 mEq/ L per 24 hr period   Added fluid restriction to diet    MA + CKD  added NaBicarb po    Anemia + CKD check iron studies    repeat BMP at 2 PM    No urgent need for HD but may need it depending on coarse  Renally dose meds, avoid nephrotoxic agents   Monitor labs, will follow 73 y/o female with PMH of HTN, HLD, CAD s/p PCI, HFrEF, Mobitz II s/p PPM (MD NereydaT), PAD with chronic right LE wound, CVA, DM2, CKD3 came to the ED complaining of chest pressure/ SOB    BRADY on CKD suspect stage III   Suspect SALO   S/p cardiac cath on 03/22 PCI to RCA with ZEINAB  Serum Cr 1.6 on 3/22 baseline seems to be 1.5- 1.8  Low BP since admission - ATN?- likely from sepsis w + blood Cx   Has h/o poorly controlled DM likely etiology of CKD  UA w + hematuria/ proteinuria  CT abd noted:  No hydronephrosis - Enlarged edematous L kidney w increased perinephric fat stranding- Likely Pyelo  Noted + Blood Cx--> ESBL Bacteremia--> on IV Abx --> Ertapenem  LVEF 31% on 3/21/25  Agree w holding Farxiga and Entresto for now    Systolic HF exacerbation at admit s/p IV Lasix, diuretics now on hold     Hypochloridemia likely from diuretics   HypoCalcemia w low albumin corrects to approx 7.4 mg/ dL check PTH, Vit D    HypoNatremia  Serum Na 127--> 125  Urine studies not so helpful w recent diuretics  Will start Urea powder first dose now  Caution w correction more than 6-8 mEq/ L per 24 hr period   Added fluid restriction to diet    MA + CKD  added NaBicarb po    Anemia + CKD check iron studies    repeat BMP at 2 PM  Discussed w family at bedside  No urgent need for HD but may need it depending on coarse  Renally dose meds, avoid nephrotoxic agents   Monitor labs, will follow

## 2025-03-24 NOTE — CHART NOTE - NSCHARTNOTEFT_GEN_A_CORE
BMP discussed with Dr Whitt  03-24    118[LL]  |  89[L]  |  77.6[H]  ----------------------------<  200[H]  6.0[H]   |  13.0[L]  |  3.50[H]    Ca    6.2[LL]      24 Mar 2025 21:13  Phos  3.2     03-24  Mg     2.1     03-24    Lasix 80mg IVP  Lokelma 10grams po x 1  Calcium gluconate 1 gram IVPB  Sodium Chloride 2% @ 30cc/hr X 6 hrs  BMP at 6am  Continue to monitor BMP discussed with Dr Whitt  03-24    118[LL]  |  89[L]  |  77.6[H]  ----------------------------<  200[H]  6.0[H]   |  13.0[L]  |  3.50[H]  K Hemolized    Ca    6.2[LL]      24 Mar 2025 21:13  Phos  3.2     03-24  Mg     2.1     03-24    Lasix 80mg IVP  Lokelma 10grams po x 1  Calcium gluconate 1 gram IVPB  Sodium Chloride 2% @ 30cc/hr X 6 hrs  BMP at 6am  MICU consulted today for hypotension  B/P 102/52  MICU consulted for Na 118  Awaiting MICU recs before Lasix will be given  Continue to monitor BMP discussed with Dr Whitt  03-24    118[LL]  |  89[L]  |  77.6[H]  ----------------------------<  200[H]  6.0[H]   |  13.0[L]  |  3.50[H]  K Hemolized    Ca    6.2[LL]      24 Mar 2025 21:13  Phos  3.2     03-24  Mg     2.1     03-24    Lasix 80mg IVP  Lokelma 10grams po x 1  Calcium gluconate 1 gram IVPB  Sodium Chloride 2% @ 30cc/hr X 6 hrs  Repeat BMP in 2 hrs  BMP at 6am  MICU consulted today for hypotension  B/P 102/52  MICU consulted for Na 118  Awaiting MICU recs before Lasix will be given  Continue to monitor BMP discussed with Dr Whitt  03-24    118[LL]  |  89[L]  |  77.6[H]  ----------------------------<  200[H]  6.0[H]   |  13.0[L]  |  3.50[H]      Ca    6.2[LL]      24 Mar 2025 21:13  Phos  3.2     03-24  Mg     2.1     03-24    Lasix 80mg IVP  Lokelma 10grams po x 1  Calcium gluconate 1 gram IVPB  Sodium Chloride 2% @ 30cc/hr X 6 hrs  Repeat BMP in 2 hrs  BMP at 6am  MICU consulted today for hypotension  B/P 102/52  MICU consulted for Na 118  Awaiting MICU recs before Lasix will be given  Continue to monitor BMP discussed with Dr Whitt  03-24    118[LL]  |  89[L]  |  77.6[H]  ----------------------------<  200[H]  6.0[H]   |  13.0[L]  |  3.50[H]      Ca    6.2[LL]      24 Mar 2025 21:13  Phos  3.2     03-24  Mg     2.1     03-24    Lasix 80mg IVP  Lokelma 10grams po x 1  Calcium gluconate 1 gram IVPB  Sodium Chloride 2% @ 30cc/hr X 6 hrs  Repeat BMP in 2 hrs  BMP at 6am  MICU consulted today for hypotension  B/P 102/52  Thyroid panel  MICU consulted for Na 118  Awaiting MICU recs before Lasix will be given  Continue to monitor

## 2025-03-24 NOTE — PROGRESS NOTE ADULT - SUBJECTIVE AND OBJECTIVE BOX
Noted repeat labs   HypoNatremia --> start 2% saline 40 cc/hr x 6 hrs  HyperKalemia -->Lokelma x1 diet adjusted  HypoCalcemia agree w IV supplementation    will follow

## 2025-03-24 NOTE — CONSULT NOTE ADULT - ASSESSMENT
Gen Cards: Dr. Allen  HF: Dr. Polanco    73 y/o female with PMH of ICM/HFrEF (LVEF 35-40%), CAD s/p PCI, Mobitz II s/p PPM (MD NereydaT), HTN, HLD, PAD with chronic right LE wound, DM2, and CKD3, who presented to the ED on 3/19 with c/o CP, found to have NSTEMI.     She is s/p LHC on 3/21 which showed mid-distal LAD severe stenosis, OM1 and OM2 severe stenosis, mid ISR 50%, distal ISR 99%, s/p PCI with 1 ZEINAB. Her PAD on her CardioMEMS was 22 on 3/21 (goal 14) so her diuretics were resumed with Lasix 40 mg IV BID. Over the weekend she had a fever with leukocytosis and her blood cultures are positive for E. Coli. Her CT A/P showed pyelonephritis. She has also an BRADY for which her diuretics and Entresto were held.

## 2025-03-24 NOTE — CONSULT NOTE ADULT - SUBJECTIVE AND OBJECTIVE BOX
ADVANCED HEART FAILURE - CONSULT NOTE  [address] [place] [address]  Office Phone: [phone]/Fax: [phone]  Service/On Call Phone [phone]  _______________________________________________________________________________________________________    HPI:  75 y/o female with PMH of ICM/HFrEF (LVEF 35-40%), CAD s/p PCI, Mobitz II s/p PPM (Nereyda, MDT), HTN, HLD, PAD with chronic right LE wound, DM2, and CKD3, who presented to the ED on 3/19 with c/o CP, found to have NSTEMI.     She is s/p LHC on 3/21 which showed mid-distal LAD severe stenosis, OM1 and OM2 severe stenosis, mid ISR 50%, distal ISR 99%, s/p PCI with 1 ZEINAB. Her PAD on her CardioMEMS was 22 on 3/21 (goal 14) so her diuretics were resumed with Lasix 40 mg IV BID. Over the weekend she had a fever with leukocytosis and her blood cultures are positive for E. Coli. Her CT A/P showed pyelonephritis. She has also an BRADY for which her diuretics and Entresto were held.      PAST MEDICAL & SURGICAL HISTORY:  DM (diabetes mellitus)      HTN (hypertension)      H/O gastroesophageal reflux (GERD)      Cardiac pacemaker  MICRA for mobitz type 11      CVA (cerebrovascular accident)  resdiual right sided weakness      CVA (cerebrovascular accident)      PAD (peripheral artery disease)      Wound of right leg      History of benign brain tumor      Cataract      History of biopsy      Cardiac pacemaker      S/P angiogram of extremity        REVIEW OF SYSTEMS:  14 point ROS negative in detail apart from as documented in HPI.    MEDICATIONS  (STANDING):  aspirin enteric coated 81 milliGRAM(s) Oral daily  atorvastatin 80 milliGRAM(s) Oral at bedtime  chlorhexidine 2% Cloths 1 Application(s) Topical daily  dextrose 5%. 1000 milliLiter(s) (100 mL/Hr) IV Continuous <Continuous>  dextrose 5%. 1000 milliLiter(s) (50 mL/Hr) IV Continuous <Continuous>  dextrose 50% Injectable 25 Gram(s) IV Push once  dextrose 50% Injectable 12.5 Gram(s) IV Push once  dextrose 50% Injectable 25 Gram(s) IV Push once  ertapenem  IVPB 500 milliGRAM(s) IV Intermittent every 24 hours  ferrous    sulfate 325 milliGRAM(s) Oral daily  gabapentin 400 milliGRAM(s) Oral at bedtime  glucagon  Injectable 1 milliGRAM(s) IntraMuscular once  HYDROmorphone  Injectable 0.5 milliGRAM(s) IV Push once  insulin glargine Injectable (LANTUS) 26 Unit(s) SubCutaneous at bedtime  insulin lispro (ADMELOG) corrective regimen sliding scale   SubCutaneous three times a day before meals  metoprolol tartrate 25 milliGRAM(s) Oral two times a day  ranolazine 500 milliGRAM(s) Oral two times a day  sodium bicarbonate 650 milliGRAM(s) Oral three times a day  ticagrelor 90 milliGRAM(s) Oral two times a day  urea Oral Powder 15 Gram(s) Oral daily    MEDICATIONS  (PRN):  acetaminophen     Tablet .. 650 milliGRAM(s) Oral every 6 hours PRN Temp greater or equal to 38C (100.4F), Mild Pain (1 - 3)  aluminum hydroxide/magnesium hydroxide/simethicone Suspension 30 milliLiter(s) Oral every 4 hours PRN Dyspepsia  dextrose Oral Gel 15 Gram(s) Oral once PRN Blood Glucose LESS THAN 70 milliGRAM(s)/deciliter  melatonin 3 milliGRAM(s) Oral at bedtime PRN Insomnia  ondansetron Injectable 4 milliGRAM(s) IV Push every 8 hours PRN Nausea and/or Vomiting  oxyCODONE    IR 5 milliGRAM(s) Oral every 6 hours PRN moderate to severe pain    Home Medications:  gabapentin 800 mg oral tablet: 0.5 tab(s) orally once a day (2025 11:55)  insulin glargine 100 units/mL subcutaneous solution: 26 unit(s) subcutaneous once a day (at bedtime) (2025 14:25)  metoprolol succinate 100 mg oral tablet, extended release: 1 tab(s) orally once a day (2025 14:25)  ranolazine 500 mg oral tablet, extended release: 1 tab(s) orally 2 times a day (2025 14:25)    Allergies  No Known Allergies    Social History:  No cigarette, alcohol or illicit drug use. , lives with family. (20 Mar 2025 02:51)    FAMILY HISTORY:  FH: asthma  Son        ICU Vital Signs Last 24 Hrs  T(C): 36.5, Max: 38.2 ( @ 17:49)  HR: 67 (66 - 78)  BP: 94/62 (88/40 - 108/57)  BP(mean): 69 (69 - 72)  ABP: --  ABP(mean): --  RR: 20 (17 - 20)  SpO2: 98% (93% - 98%)    Weight in k.3 (25)  Weight in k (25)      I&O's Summary Last 24 Hrs    IN: 1642 mL / OUT: 275 mL / NET: 1367 mL      Tele:  60-70s    Physical Exam:    General: No distress. Comfortable.  Neck: JVP mildly elevated.  Respiratory: Clear to auscultation bilaterally  CV: RRR. Normal S1 and S2. No murmurs, rub, or gallops. Radial pulses normal.  Abdomen: Soft, non-distended, non-tender  Extremities: Warm, no edema  Neurology: Non-focal, oriented times three. Appears drowsy.  Psych: Affect normal    Labs:    ( 25 @ 05:20 )               9.0    15.80 )--------( 308                  27.4     ( 25 @ 05:20 )     125  |  91  |  58.2  ---------------------<  202  4.9  |  18.0  |  3.54    Ca 6.7  Phos 3.2  Mg 2.1    ( 25 @ 06:15 )  TPro  7.0  /  Alb  3.1  /  TBili  0.7  /  DBili  x   /  AST  20  /  ALT  11  /  AlkPhos  103    ( 25 @ 00:20 )  TropHS 854   / CK x     / CKMB x      ( 25 @ 22:08 )  TropHS 906   / CK x     / CKMB x

## 2025-03-24 NOTE — CHART NOTE - NSCHARTNOTEFT_GEN_A_CORE
Called by RN after attempt to place Sierra unsuccessful  pt to be upgraded to stepdown due to electrolyte imbalances and BRADY   Malaysian speaking pt, Malaysian interpret used to explain procedure questions answered.  Pt unable to void on her own at this time, bladder scan with 250ml  second attempt by medicine ACP unsuccessful and urology consult called

## 2025-03-25 LAB
24R-OH-CALCIDIOL SERPL-MCNC: 19 NG/ML — SIGNIFICANT CHANGE UP
ALBUMIN SERPL ELPH-MCNC: 2.8 G/DL — LOW (ref 3.3–5.2)
ALP SERPL-CCNC: 188 U/L — HIGH (ref 40–120)
ALT FLD-CCNC: 20 U/L — SIGNIFICANT CHANGE UP
ANION GAP SERPL CALC-SCNC: 16 MMOL/L — SIGNIFICANT CHANGE UP (ref 5–17)
ANION GAP SERPL CALC-SCNC: 16 MMOL/L — SIGNIFICANT CHANGE UP (ref 5–17)
AST SERPL-CCNC: 38 U/L — HIGH
BILIRUB SERPL-MCNC: 0.7 MG/DL — SIGNIFICANT CHANGE UP (ref 0.4–2)
BUN SERPL-MCNC: 80.1 MG/DL — HIGH (ref 8–20)
BUN SERPL-MCNC: 82.1 MG/DL — HIGH (ref 8–20)
CALCIUM SERPL-MCNC: 6.5 MG/DL — CRITICAL LOW (ref 8.4–10.5)
CALCIUM SERPL-MCNC: 6.9 MG/DL — LOW (ref 8.4–10.5)
CALCIUM SERPL-MCNC: 6.9 MG/DL — LOW (ref 8.4–10.5)
CHLORIDE SERPL-SCNC: 91 MMOL/L — LOW (ref 96–108)
CHLORIDE SERPL-SCNC: 93 MMOL/L — LOW (ref 96–108)
CO2 SERPL-SCNC: 17 MMOL/L — LOW (ref 22–29)
CO2 SERPL-SCNC: 17 MMOL/L — LOW (ref 22–29)
CREAT SERPL-MCNC: 3.54 MG/DL — HIGH (ref 0.5–1.3)
CREAT SERPL-MCNC: 3.58 MG/DL — HIGH (ref 0.5–1.3)
EGFR: 13 ML/MIN/1.73M2 — LOW
FERRITIN SERPL-MCNC: 350 NG/ML — HIGH (ref 13–330)
GAS PNL BLDV: SIGNIFICANT CHANGE UP
GLUCOSE BLDC GLUCOMTR-MCNC: 167 MG/DL — HIGH (ref 70–99)
GLUCOSE BLDC GLUCOMTR-MCNC: 181 MG/DL — HIGH (ref 70–99)
GLUCOSE BLDC GLUCOMTR-MCNC: 266 MG/DL — HIGH (ref 70–99)
GLUCOSE BLDC GLUCOMTR-MCNC: 279 MG/DL — HIGH (ref 70–99)
GLUCOSE SERPL-MCNC: 182 MG/DL — HIGH (ref 70–99)
GLUCOSE SERPL-MCNC: 191 MG/DL — HIGH (ref 70–99)
HCT VFR BLD CALC: 25.5 % — LOW (ref 34.5–45)
HGB BLD-MCNC: 8.5 G/DL — LOW (ref 11.5–15.5)
IRON SATN MFR SERPL: 10 UG/DL — LOW (ref 37–145)
IRON SATN MFR SERPL: 7 % — LOW (ref 14–50)
MAGNESIUM SERPL-MCNC: 2.2 MG/DL — SIGNIFICANT CHANGE UP (ref 1.6–2.6)
MAGNESIUM SERPL-MCNC: 2.3 MG/DL — SIGNIFICANT CHANGE UP (ref 1.6–2.6)
MCHC RBC-ENTMCNC: 28.4 PG — SIGNIFICANT CHANGE UP (ref 27–34)
MCHC RBC-ENTMCNC: 33.3 G/DL — SIGNIFICANT CHANGE UP (ref 32–36)
MCV RBC AUTO: 85.3 FL — SIGNIFICANT CHANGE UP (ref 80–100)
NRBC # BLD AUTO: 0 K/UL — SIGNIFICANT CHANGE UP (ref 0–0)
NRBC # FLD: 0 K/UL — SIGNIFICANT CHANGE UP (ref 0–0)
NRBC BLD AUTO-RTO: 0 /100 WBCS — SIGNIFICANT CHANGE UP (ref 0–0)
PHOSPHATE SERPL-MCNC: 3.2 MG/DL — SIGNIFICANT CHANGE UP (ref 2.4–4.7)
PHOSPHATE SERPL-MCNC: 3.8 MG/DL — SIGNIFICANT CHANGE UP (ref 2.4–4.7)
PLATELET # BLD AUTO: 344 K/UL — SIGNIFICANT CHANGE UP (ref 150–400)
PMV BLD: 10.2 FL — SIGNIFICANT CHANGE UP (ref 7–13)
POTASSIUM SERPL-MCNC: 4.4 MMOL/L — SIGNIFICANT CHANGE UP (ref 3.5–5.3)
POTASSIUM SERPL-MCNC: 5.1 MMOL/L — SIGNIFICANT CHANGE UP (ref 3.5–5.3)
POTASSIUM SERPL-SCNC: 4.4 MMOL/L — SIGNIFICANT CHANGE UP (ref 3.5–5.3)
POTASSIUM SERPL-SCNC: 5.1 MMOL/L — SIGNIFICANT CHANGE UP (ref 3.5–5.3)
PROT SERPL-MCNC: 6.1 G/DL — LOW (ref 6.6–8.7)
PTH-INTACT FLD-MCNC: 439 PG/ML — HIGH (ref 15–65)
RBC # BLD: 2.99 M/UL — LOW (ref 3.8–5.2)
RBC # FLD: 15.7 % — HIGH (ref 10.3–14.5)
SODIUM SERPL-SCNC: 123 MMOL/L — LOW (ref 135–145)
SODIUM SERPL-SCNC: 126 MMOL/L — LOW (ref 135–145)
T3 SERPL-MCNC: 41 NG/DL — LOW (ref 80–200)
T4 AB SER-ACNC: 4.7 UG/DL — SIGNIFICANT CHANGE UP (ref 4.5–12)
T4 FREE SERPL-MCNC: 1.3 NG/DL — SIGNIFICANT CHANGE UP (ref 0.9–1.7)
TIBC SERPL-MCNC: 146 UG/DL — LOW (ref 220–430)
TRANSFERRIN SERPL-MCNC: 102 MG/DL — LOW (ref 192–382)
TSH SERPL-MCNC: 1.27 UIU/ML — SIGNIFICANT CHANGE UP (ref 0.27–4.2)
WBC # BLD: 13.44 K/UL — HIGH (ref 3.8–10.5)
WBC # FLD AUTO: 13.44 K/UL — HIGH (ref 3.8–10.5)

## 2025-03-25 PROCEDURE — 99232 SBSQ HOSP IP/OBS MODERATE 35: CPT

## 2025-03-25 PROCEDURE — 99233 SBSQ HOSP IP/OBS HIGH 50: CPT

## 2025-03-25 PROCEDURE — G0545: CPT

## 2025-03-25 RX ORDER — IRON SUCROSE 20 MG/ML
200 INJECTION, SOLUTION INTRAVENOUS EVERY 24 HOURS
Refills: 0 | Status: COMPLETED | OUTPATIENT
Start: 2025-03-25 | End: 2025-03-29

## 2025-03-25 RX ORDER — CALCIUM GLUCONATE 20 MG/ML
2 INJECTION, SOLUTION INTRAVENOUS ONCE
Refills: 0 | Status: COMPLETED | OUTPATIENT
Start: 2025-03-25 | End: 2025-03-25

## 2025-03-25 RX ORDER — CALCIUM GLUCONATE 20 MG/ML
1 INJECTION, SOLUTION INTRAVENOUS ONCE
Refills: 0 | Status: COMPLETED | OUTPATIENT
Start: 2025-03-25 | End: 2025-03-25

## 2025-03-25 RX ORDER — CALCITRIOL 0.5 UG/1
0.25 CAPSULE, GELATIN COATED ORAL DAILY
Refills: 0 | Status: DISCONTINUED | OUTPATIENT
Start: 2025-03-25 | End: 2025-03-31

## 2025-03-25 RX ORDER — SODIUM CHLORIDE 9 G/1000ML
1000 INJECTION, SOLUTION INTRAVENOUS
Refills: 0 | Status: DISCONTINUED | OUTPATIENT
Start: 2025-03-25 | End: 2025-03-28

## 2025-03-25 RX ADMIN — CALCIUM GLUCONATE 200 GRAM(S): 20 INJECTION, SOLUTION INTRAVENOUS at 16:32

## 2025-03-25 RX ADMIN — TICAGRELOR 90 MILLIGRAM(S): 90 TABLET ORAL at 05:03

## 2025-03-25 RX ADMIN — ATORVASTATIN CALCIUM 80 MILLIGRAM(S): 80 TABLET, FILM COATED ORAL at 21:17

## 2025-03-25 RX ADMIN — CALCIUM GLUCONATE 100 GRAM(S): 20 INJECTION, SOLUTION INTRAVENOUS at 00:21

## 2025-03-25 RX ADMIN — CALCITRIOL 0.25 MICROGRAM(S): 0.5 CAPSULE, GELATIN COATED ORAL at 18:44

## 2025-03-25 RX ADMIN — MIDODRINE HYDROCHLORIDE 5 MILLIGRAM(S): 5 TABLET ORAL at 21:18

## 2025-03-25 RX ADMIN — Medication 81 MILLIGRAM(S): at 12:46

## 2025-03-25 RX ADMIN — IPRATROPIUM BROMIDE AND ALBUTEROL SULFATE 3 MILLILITER(S): .5; 2.5 SOLUTION RESPIRATORY (INHALATION) at 09:06

## 2025-03-25 RX ADMIN — SODIUM CHLORIDE 60 MILLILITER(S): 9 INJECTION, SOLUTION INTRAVENOUS at 16:32

## 2025-03-25 RX ADMIN — Medication 650 MILLIGRAM(S): at 20:18

## 2025-03-25 RX ADMIN — SODIUM CHLORIDE 30 MILLILITER(S): 3 INJECTION, SOLUTION INTRAVENOUS at 00:22

## 2025-03-25 RX ADMIN — SODIUM ZIRCONIUM CYCLOSILICATE 10 GRAM(S): 5 POWDER, FOR SUSPENSION ORAL at 00:17

## 2025-03-25 RX ADMIN — MIDODRINE HYDROCHLORIDE 5 MILLIGRAM(S): 5 TABLET ORAL at 14:33

## 2025-03-25 RX ADMIN — Medication 1300 MILLIGRAM(S): at 21:17

## 2025-03-25 RX ADMIN — IPRATROPIUM BROMIDE AND ALBUTEROL SULFATE 3 MILLILITER(S): .5; 2.5 SOLUTION RESPIRATORY (INHALATION) at 04:09

## 2025-03-25 RX ADMIN — Medication 1300 MILLIGRAM(S): at 05:04

## 2025-03-25 RX ADMIN — IPRATROPIUM BROMIDE AND ALBUTEROL SULFATE 3 MILLILITER(S): .5; 2.5 SOLUTION RESPIRATORY (INHALATION) at 21:28

## 2025-03-25 RX ADMIN — IPRATROPIUM BROMIDE AND ALBUTEROL SULFATE 3 MILLILITER(S): .5; 2.5 SOLUTION RESPIRATORY (INHALATION) at 14:33

## 2025-03-25 RX ADMIN — ERTAPENEM SODIUM 100 MILLIGRAM(S): 1 INJECTION, POWDER, LYOPHILIZED, FOR SOLUTION INTRAMUSCULAR; INTRAVENOUS at 10:38

## 2025-03-25 RX ADMIN — Medication 1 APPLICATION(S): at 12:46

## 2025-03-25 RX ADMIN — INSULIN LISPRO 2: 100 INJECTION, SOLUTION INTRAVENOUS; SUBCUTANEOUS at 12:46

## 2025-03-25 RX ADMIN — Medication 650 MILLIGRAM(S): at 05:03

## 2025-03-25 RX ADMIN — Medication 650 MILLIGRAM(S): at 06:03

## 2025-03-25 RX ADMIN — Medication 15 GRAM(S): at 16:33

## 2025-03-25 RX ADMIN — MIDODRINE HYDROCHLORIDE 5 MILLIGRAM(S): 5 TABLET ORAL at 05:03

## 2025-03-25 RX ADMIN — FUROSEMIDE 80 MILLIGRAM(S): 10 INJECTION INTRAMUSCULAR; INTRAVENOUS at 00:50

## 2025-03-25 RX ADMIN — RANOLAZINE 500 MILLIGRAM(S): 1000 TABLET, FILM COATED, EXTENDED RELEASE ORAL at 18:31

## 2025-03-25 RX ADMIN — Medication 650 MILLIGRAM(S): at 19:18

## 2025-03-25 RX ADMIN — TICAGRELOR 90 MILLIGRAM(S): 90 TABLET ORAL at 18:31

## 2025-03-25 RX ADMIN — RANOLAZINE 500 MILLIGRAM(S): 1000 TABLET, FILM COATED, EXTENDED RELEASE ORAL at 05:04

## 2025-03-25 RX ADMIN — INSULIN LISPRO 6: 100 INJECTION, SOLUTION INTRAVENOUS; SUBCUTANEOUS at 18:30

## 2025-03-25 RX ADMIN — Medication 325 MILLIGRAM(S): at 12:46

## 2025-03-25 RX ADMIN — INSULIN LISPRO 2: 100 INJECTION, SOLUTION INTRAVENOUS; SUBCUTANEOUS at 09:48

## 2025-03-25 RX ADMIN — IRON SUCROSE 110 MILLIGRAM(S): 20 INJECTION, SOLUTION INTRAVENOUS at 16:32

## 2025-03-25 RX ADMIN — INSULIN GLARGINE-YFGN 26 UNIT(S): 100 INJECTION, SOLUTION SUBCUTANEOUS at 21:21

## 2025-03-25 RX ADMIN — CALCIUM GLUCONATE 100 GRAM(S): 20 INJECTION, SOLUTION INTRAVENOUS at 03:35

## 2025-03-25 RX ADMIN — Medication 1300 MILLIGRAM(S): at 14:33

## 2025-03-25 RX ADMIN — GABAPENTIN 400 MILLIGRAM(S): 400 CAPSULE ORAL at 21:17

## 2025-03-25 NOTE — PROGRESS NOTE ADULT - ASSESSMENT
74 F w/ HTN, CAD s/p PCI, HfrEF, DM, admitted w/ NSTEMI, UTI, ESBL E.coli bacteremia, found to have BRADY on CKD w/ worsening hyponatremia today for which MICU was reconsulted. Na improved from 118 to 123. Acute hyponatremia but she is asymptomatic. Follow up response to furosemide challenge. Will likely need HD given worsening metabolic acidosis and electrolyte imbalances. Recommend giving more calcium gluconate. Follow up with nephrology.     Patient currently does not require MICU level of care. Please don't hesitate to reach out to us for any changes in clinical status.    d/w primary team

## 2025-03-25 NOTE — PROGRESS NOTE ADULT - SUBJECTIVE AND OBJECTIVE BOX
[facility]  INF[facility] DISEAS[facility] at [place] [facility] [place] [facility]  =======================================================                              Leo Lam MD                              Professor Emeritus:  Dr Angelo Sebastian MD            Diplomates American Board of Internal Medicine & Infectious Diseases                                   Tel  [phone] Fax [phone]                                  Hospital Consult line:  [phone]  =======================================================    Follow Up: Pyelonephritis, bacteremia     Interval History/ROS: Seen at bedside. Hospital course complicated by acute renal failure and hyponatremia.     REVIEW OF SYSTEMS  Fever curve has improved  Flank pain has improved     Allergies  No Known Allergies    ANTIMICROBIALS:    ertapenem  IVPB 500 every 24 hours    OTHER MEDS: MEDICATIONS  (STANDING):  acetaminophen     Tablet .. 650 every 6 hours PRN  albuterol/ipratropium for Nebulization 3 every 6 hours  aluminum hydroxide/magnesium hydroxide/simethicone Suspension 30 every 4 hours PRN  aspirin enteric coated 81 daily  atorvastatin 80 at bedtime  dextrose 50% Injectable 25 once  dextrose 50% Injectable 12.5 once  dextrose 50% Injectable 25 once  dextrose Oral Gel 15 once PRN  gabapentin 400 at bedtime  glucagon  Injectable 1 once  HYDROmorphone  Injectable 0.5 once  insulin glargine Injectable (LANTUS) 26 at bedtime  insulin lispro (ADMELOG) corrective regimen sliding scale  three times a day before meals  melatonin 3 at bedtime PRN  midodrine. 5 every 8 hours  ondansetron Injectable 4 every 8 hours PRN  oxyCODONE    IR 5 every 6 hours PRN  ranolazine 500 two times a day  ticagrelor 90 two times a day  urea Oral Powder 15 daily    Vital Signs Last 24 Hrs  T(F): 98.3 (03-25-25 @ 13:20), Max: 101.8 (03-24-25 @ 17:57)    Vital Signs Last 24 Hrs  HR: 65 (03-25-25 @ 12:00) (60 - 76)  BP: 106/49 (03-25-25 @ 10:00) (90/45 - 132/50)  RR: 18 (03-25-25 @ 12:00)  SpO2: 98% (03-25-25 @ 12:00) (95% - 100%)  Wt(kg): --    EXAM:  General: NAD   HEENT: NCAT  CV: S1+S2  Lungs: No acute respiratory distress  Abd: Soft  : Sierra   Ext: No cyanosis  Neuro: Calm   Skin: No rash     Labs:                        8.5    13.44 )-----------( 344      ( 25 Mar 2025 08:09 )             25.5     03-25    126[L]  |  93[L]  |  82.1[H]  ----------------------------<  182[H]  4.4   |  17.0[L]  |  3.54[H]    Ca    6.9[L]      25 Mar 2025 08:09  Phos  3.8     03-25  Mg     2.2     03-25    TPro  6.1[L]  /  Alb  2.8[L]  /  TBili  0.7  /  DBili  x   /  AST  38[H]  /  ALT  20  /  AlkPhos  188[H]  03-25    WBC Trend:  WBC Count: 13.44 (03-25-25 @ 08:09)  WBC Count: 15.80 (03-24-25 @ 05:20)  WBC Count: 18.49 (03-23-25 @ 06:15)  WBC Count: 9.03 (03-22-25 @ 04:45)    Creatine Trend:  Creatinine: 3.54 (03-25)  Creatinine: 3.58 (03-25)  Creatinine: 3.50 (03-24)  Creatinine: 3.66 (03-24)    Liver Biochemical Testing Trend:  Alanine Aminotransferase (ALT/SGPT): 20 (03-25)  Alanine Aminotransferase (ALT/SGPT): 11 (03-23)  Alanine Aminotransferase (ALT/SGPT): 19 (03-19)  Alanine Aminotransferase (ALT/SGPT): 24 (01-07)  Alanine Aminotransferase (ALT/SGPT): 13 (12-20)  Aspartate Aminotransferase (AST/SGOT): 38 (03-25-25 @ 01:20)  Aspartate Aminotransferase (AST/SGOT): 20 (03-23-25 @ 06:15)  Aspartate Aminotransferase (AST/SGOT): 27 (03-19-25 @ 22:08)  Aspartate Aminotransferase (AST/SGOT): 24 (01-07-25 @ 17:16)  Aspartate Aminotransferase (AST/SGOT): 15 (12-20-24 @ 05:00)  Bilirubin Total: 0.7 (03-25)  Bilirubin Total: 0.7 (03-23)  Bilirubin Total: 0.9 (03-19)  Bilirubin Total: 0.3 (01-07)  Bilirubin Total: 0.3 (12-20)    Urinalysis Basic - ( 25 Mar 2025 08:09 )    Color: x / Appearance: x / SG: x / pH: x  Gluc: 182 mg/dL / Ketone: x  / Bili: x / Urobili: x   Blood: x / Protein: x / Nitrite: x   Leuk Esterase: x / RBC: x / WBC x   Sq Epi: x / Non Sq Epi: x / Bacteria: x    MICROBIOLOGY:    MRSA PCR Result.: NotDetec (03-20-25 @ 13:30)  MRSA PCR Result.: NotDetec (01-13-25 @ 04:00)  MRSA PCR Result.: NotDetec (10-14-24 @ 04:00)  MRSA PCR Result.: NotDetec (07-12-24 @ 17:45)  MRSA PCR Result.: NotDetec (05-10-24 @ 10:10)    Culture - Urine (collected 22 Mar 2025 15:30)  Source: Catheterized Catheterized  Preliminary Report:    >100,000 CFU/ml Escherichia coli    Culture - Blood (collected 22 Mar 2025 15:20)  Source: Blood Blood-Peripheral  Final Report:    Growth in aerobic and anaerobic bottles: Escherichia coli ESBL    Direct identification is available within approximately 3-5    hours either by Blood Panel Multiplexed PCR or Direct    MALDI-TOF. Details: [email or web address]  Organism: Blood Culture PCR  Escherichia coli ESBL  Organism: Escherichia coli ESBL    Sensitivities:      Method Type: RENNY      -  Ampicillin: R >16 These ampicillin results predict results for amoxicillin      -  Ampicillin/Sulbactam: R >16/8      -  Aztreonam: R >16      -  Cefazolin: R >16      -  Cefepime: R >16      -  Ceftriaxone: R >32      -  Ciprofloxacin: R >2      -  Ertapenem: S <=0.5      -  Gentamicin: S <=2      -  Imipenem: S <=1      -  Levofloxacin: R >4      -  Meropenem: S <=1      -  Piperacillin/Tazobactam: R <=8      -  Tobramycin: S <=2      -  Trimethoprim/Sulfamethoxazole: R >2/38  Organism: Blood Culture PCR    Sensitivities:      Method Type: PCR      -  Escherichia coli: Detec      -  ESBL: Detec      -  CTX-M Resistance Marker: Detec    Culture - Blood (collected 22 Mar 2025 15:12)  Source: Blood Blood-Peripheral  Final Report:    Growth in aerobic and anaerobic bottles: Escherichia coli    See previous culture [id number]    Culture - Urine (collected 11 Dec 2024 10:52)  Source: Catheterized Catheterized  Final Report:    >100,000 CFU/ml Escherichia coli ESBL  Organism: Escherichia coli ESBL  Organism: Escherichia coli ESBL    Sensitivities:      Method Type: RENNY      -  Ampicillin: R >16 These ampicillin results predict results for amoxicillin      -  Ampicillin/Sulbactam: R >16/8      -  Aztreonam: R >16      -  Cefazolin: R >16 For uncomplicated UTI with K. pneumoniae, E. coli, or P. mirablis: RENNY <=16 is sensitive and RENNY >=32 is resistant. This also predicts results for oral agents cefaclor, cefdinir, cefpodoxime, cefprozil, cefuroxime axetil, cephalexin and locarbef for uncomplicated UTI. Note that some isolates may be susceptible to these agents while testing resistant to cefazolin.      -  Cefepime: R >16      -  Ceftriaxone: R >32      -  Cefuroxime: R >16      -  Ciprofloxacin: R >2      -  Ertapenem: S <=0.5      -  Gentamicin: S <=2      -  Imipenem: S <=1      -  Levofloxacin: R >4      -  Meropenem: S <=1      -  Nitrofurantoin: S <=32 Should not be used to treat pyelonephritis      -  Piperacillin/Tazobactam: R >64      -  Tobramycin: S <=2      -  Trimethoprim/Sulfamethoxazole: R >2/38    Culture - Urine (collected 08 Oct 2024 14:19)  Source: Clean Catch Clean Catch (Midstream)  Final Report:    >100,000 CFU/ml Escherichia coli ESBL  Organism: Escherichia coli ESBL  Organism: Escherichia coli ESBL    Sensitivities:      Method Type: RENNY      -  Ampicillin: R >16 These ampicillin results predict results for amoxicillin      -  Ampicillin/Sulbactam: R >16/8      -  Aztreonam: R >16      -  Cefazolin: R >16 For uncomplicated UTI with K. pneumoniae, E. coli, or P. mirablis: RENNY <=16 is sensitive and RENNY >=32 is resistant. This also predicts results for oral agents cefaclor, cefdinir, cefpodoxime, cefprozil, cefuroxime axetil, cephalexin and locarbef for uncomplicated UTI. Note that some isolates may be susceptible to these agents while testing resistant to cefazolin.      -  Cefepime: R >16      -  Ceftriaxone: R >32      -  Cefuroxime: R >16      -  Ciprofloxacin: R >2      -  Ertapenem: S <=0.5      -  Gentamicin: S <=2      -  Imipenem: S <=1      -  Levofloxacin: R >4      -  Meropenem: S <=1      -  Nitrofurantoin: S <=32 Should not be used to treat pyelonephritis      -  Piperacillin/Tazobactam: S <=8      -  Tobramycin: S <=2      -  Trimethoprim/Sulfamethoxazole: R >2/38    Culture - Blood (collected 10 May 2024 03:37)  Source: .Blood Blood  Final Report:    No growth at 5 days    Culture - Blood (collected 10 May 2024 03:33)  Source: .Blood Blood  Final Report:    No growth at 5 days    Culture - Blood (collected 02 Mar 2024 18:15)  Source: .Blood Blood-Peripheral  Final Report:    No growth at 5 days    Culture - Urine (collected 02 Mar 2024 18:15)  Source: Clean Catch Clean Catch (Midstream)  Final Report:    >100,000 CFU/ml Escherichia coli ESBL  Organism: Escherichia coli ESBL  Organism: Escherichia coli ESBL    Sensitivities:      Method Type: RENNY      -  Amoxicillin/Clavulanic Acid: S <=8/4      -  Ampicillin: R >16 These ampicillin results predict results for amoxicillin      -  Ampicillin/Sulbactam: I 16/8      -  Aztreonam: R 16      -  Cefazolin: R >16 For uncomplicated UTI with K. pneumoniae, E. coli, or P. mirablis: RENNY <=16 is sensitive and RENNY >=32 is resistant. This also predicts results for oral agents cefaclor, cefdinir, cefpodoxime, cefprozil, cefuroxime axetil, cephalexin and locarbef for uncomplicated UTI. Note that some isolates may be susceptible to these agents while testing resistant to cefazolin.      -  Cefepime: R 8      -  Ceftriaxone: R >32      -  Cefuroxime: R >16      -  Ciprofloxacin: R >2      -  Ertapenem: S <=0.5      -  Gentamicin: S <=2      -  Imipenem: S <=1      -  Levofloxacin: R >4      -  Meropenem: S <=1      -  Nitrofurantoin: S <=32 Should not be used to treat pyelonephritis      -  Piperacillin/Tazobactam: S <=8      -  Tobramycin: S <=2      -  Trimethoprim/Sulfamethoxazole: R >2/38    Culture - Blood (collected 02 Mar 2024 18:10)  Source: .Blood Blood-Peripheral  Final Report:    No growth at 5 days    Rapid RVP Result: NotDetec (03-22 @ 14:27)    Ferritin: 350 (03-25)    Troponin T, High Sensitivity Result: 854 (03-20)  Troponin T, High Sensitivity Result: 906 (03-19)    Blood Gas Venous - Lactate: 1.20 (03-25 @ 01:20)  Lactate, Blood: 1.7 (03-24 @ 14:51)    RADIOLOGY:  imaging below personally reviewed    < from: CT Abdomen and Pelvis No Cont (03.22.25 @ 13:45) >  IMPRESSION:  *  No evidence of retroperitoneal hemorrhage or ileus.  *  Nondependent locules of air in the bladder and left ureter without   perivesicular fat stranding, nonspecific, could be secondary to recent   instrumentation versus cystitis. Recommend clinical correlation.  *  Enlarged, edematous left kidney with increased perinephric fat   stranding and air in the left renal collecting system can be seen in the   setting of pyelonephritis/ureteritis. Evaluation is limited due to   noncontrast technique. Correlation with laboratory values suggested.    < end of copied text >

## 2025-03-25 NOTE — PROGRESS NOTE ADULT - ASSESSMENT
74 F PMH HTN, hyperlipidemia, CAD, CHF s/p CardioMEMS, Mobitz II s/p pacemaker, PAD, CVA, DM2 (A1c 8.5), CKD3, who presented here with chest pressure.     Acute on chronic CHF exacerbation  NSTEMI s/p cardiac cath with ZEINAB to RCA on March 22nd  Course complicated by  Fevers  Leukocytosis  L sided flank pain  Abnormal UA with pyuria and bacteriuria   ESBL E Coli bacteremia   Abnormal CT, reviewed by myself, with evidence of L sided pyelonephritis (enlarged kidney with perinephric stranding)   Severe sepsis   Hospital course further complicated by acute renal failure and hyponatremia     Suggest;  Continue Ertapenem 500mg IV Q24h   Urine culture with >100k E Coli  Blood cultures from 3/22 with high grade ESBL E Coli bacteremia   Repeat blood cultures from 3/24 and 3/25 pending, follow up to ensure clearance of bacteremia   Monitor fever curve   Trend WBC count   Monitor kidney function daily while on IV antibiotics   Management of BRADY on CKD and hyponatremia as per Primary Team / Nephrology

## 2025-03-25 NOTE — PROGRESS NOTE ADULT - SUBJECTIVE AND OBJECTIVE BOX
NEPHROLOGY INTERVAL HPI/OVERNIGHT EVENTS:    Examined earlier  Feeling better  Sitting up in recliner   visiting  Denies HA CP no SOB at rest    MEDICATIONS  (STANDING):  albuterol/ipratropium for Nebulization 3 milliLiter(s) Nebulizer every 6 hours  aspirin enteric coated 81 milliGRAM(s) Oral daily  atorvastatin 80 milliGRAM(s) Oral at bedtime  chlorhexidine 2% Cloths 1 Application(s) Topical daily  dextrose 5%. 1000 milliLiter(s) (100 mL/Hr) IV Continuous <Continuous>  dextrose 5%. 1000 milliLiter(s) (50 mL/Hr) IV Continuous <Continuous>  dextrose 50% Injectable 25 Gram(s) IV Push once  dextrose 50% Injectable 12.5 Gram(s) IV Push once  dextrose 50% Injectable 25 Gram(s) IV Push once  ertapenem  IVPB 500 milliGRAM(s) IV Intermittent every 24 hours  ferrous    sulfate 325 milliGRAM(s) Oral daily  gabapentin 400 milliGRAM(s) Oral at bedtime  glucagon  Injectable 1 milliGRAM(s) IntraMuscular once  HYDROmorphone  Injectable 0.5 milliGRAM(s) IV Push once  insulin glargine Injectable (LANTUS) 26 Unit(s) SubCutaneous at bedtime  insulin lispro (ADMELOG) corrective regimen sliding scale   SubCutaneous three times a day before meals  midodrine. 5 milliGRAM(s) Oral every 8 hours  ranolazine 500 milliGRAM(s) Oral two times a day  sodium bicarbonate 1300 milliGRAM(s) Oral three times a day  sodium chloride 2% . 1000 milliLiter(s) (40 mL/Hr) IV Continuous <Continuous>  sodium chloride 2% . 1000 milliLiter(s) (30 mL/Hr) IV Continuous <Continuous>  ticagrelor 90 milliGRAM(s) Oral two times a day  urea Oral Powder 15 Gram(s) Oral daily    MEDICATIONS  (PRN):  acetaminophen     Tablet .. 650 milliGRAM(s) Oral every 6 hours PRN Temp greater or equal to 38C (100.4F), Mild Pain (1 - 3)  aluminum hydroxide/magnesium hydroxide/simethicone Suspension 30 milliLiter(s) Oral every 4 hours PRN Dyspepsia  dextrose Oral Gel 15 Gram(s) Oral once PRN Blood Glucose LESS THAN 70 milliGRAM(s)/deciliter  melatonin 3 milliGRAM(s) Oral at bedtime PRN Insomnia  ondansetron Injectable 4 milliGRAM(s) IV Push every 8 hours PRN Nausea and/or Vomiting  oxyCODONE    IR 5 milliGRAM(s) Oral every 6 hours PRN moderate to severe pain      Allergies    No Known Allergies    Intolerances        Vital Signs Last 24 Hrs  T(C): 36.8 (25 Mar 2025 13:20), Max: 38.8 (24 Mar 2025 17:57)  T(F): 98.3 (25 Mar 2025 13:20), Max: 101.8 (24 Mar 2025 17:57)  HR: 65 (25 Mar 2025 12:00) (60 - 76)  BP: 106/49 (25 Mar 2025 10:00) (90/45 - 132/50)  BP(mean): 66 (25 Mar 2025 10:00) (57 - 88)  RR: 18 (25 Mar 2025 12:00) (16 - 20)  SpO2: 98% (25 Mar 2025 12:00) (95% - 100%)    Parameters below as of 25 Mar 2025 12:00  Patient On (Oxygen Delivery Method): room air      Daily     Daily Weight in k.8 (25 Mar 2025 05:42)    PHYSICAL EXAM:  GENERAL: appears chronically ill, NAD  HEAD:  NCAT  EYES: EOMI  NECK: Supple  NERVOUS SYSTEM:  Alert & Oriented X3  CHEST/LUNG: Clear to percussion bilaterally  HEART: Regular rate and rhythm  ABDOMEN: Soft, Nontender, Nondistended; +BS  EXTREMITIES:  trace edema    LABS:                        8.5    13.44 )-----------( 344      ( 25 Mar 2025 08:09 )             25.5     03-25    126[L]  |  93[L]  |  82.1[H]  ----------------------------<  182[H]  4.4   |  17.0[L]  |  3.54[H]    Ca    6.9[L]      25 Mar 2025 08:09  Phos  3.8     03-25  Mg     2.2     03-25    TPro  6.1[L]  /  Alb  2.8[L]  /  TBili  0.7  /  DBili  x   /  AST  38[H]  /  ALT  20  /  AlkPhos  188[H]  03-25      Urinalysis Basic - ( 25 Mar 2025 08:09 )    Color: x / Appearance: x / SG: x / pH: x  Gluc: 182 mg/dL / Ketone: x  / Bili: x / Urobili: x   Blood: x / Protein: x / Nitrite: x   Leuk Esterase: x / RBC: x / WBC x   Sq Epi: x / Non Sq Epi: x / Bacteria: x      Magnesium: 2.2 mg/dL ( @ 08:09)  Phosphorus: 3.8 mg/dL ( @ 08:09)  Intact PTH: 439 pg/mL ( @ 08:09)  Vitamin D, 25-Hydroxy: 19.0 ng/mL ( @ 08:09)  Magnesium: 2.3 mg/dL ( @ 01:20)  Phosphorus: 3.2 mg/dL ( @ 01:20)          RADIOLOGY & ADDITIONAL TESTS:

## 2025-03-25 NOTE — PROGRESS NOTE ADULT - SUBJECTIVE AND OBJECTIVE BOX
Re-consulted for hyponatremia to 118 and borderline BP. On my arrival, patient is comfortable, sitting in bed, conversive. Used  ID: [id number]. . She reports no headache, dizziness, lightheadedness. She reports dysuria and decreased urine output. Given lasix 80 mg per nephrology recommendation. VBG pH was 7.31. HCO3 13. Was placed on 2% infusion with consistent drop in Na acutely down to 118. Repeat Na was 123.     Vital Signs:    T(C): 37.8 (03-25-25 @ 02:00), Max: 38.8 (03-24-25 @ 17:57)  HR: 72 (03-25-25 @ 02:00) (60 - 74)  BP: 108/80 (03-25-25 @ 02:00) (88/40 - 132/50)  RR: 18 (03-25-25 @ 02:00) (16 - 20)  SpO2: 99% (03-25-25 @ 02:00) (95% - 100%)    Vent data:        I's/O's:      03-23-25 @ 07:01  -  03-24-25 @ 07:00  --------------------------------------------------------  IN: 1642 mL / OUT: 275 mL / NET: 1367 mL    03-24-25 @ 07:01  -  03-25-25 @ 02:20  --------------------------------------------------------  IN: 940 mL / OUT: 350 mL / NET: 590 mL        PMH:    Non-ST elevation myocardial infarction (NSTEMI)    Family history of diabetes mellitus    No pertinent family history in first degree relatives    FH: asthma    Handoff    MEWS Score    DM (diabetes mellitus)    HTN (hypertension)    Acute otitis media, unspecified otitis media type    H/O gastroesophageal reflux (GERD)    Cardiac pacemaker    CVA (cerebrovascular accident)    CVA (cerebrovascular accident)    PAD (peripheral artery disease)    Wound of right leg    No pertinent past medical history    HTN (hypertension)    NSTEMI (non-ST elevation myocardial infarction)    Non-ST elevation MI (NSTEMI)    Acute on chronic HFrEF (heart failure with reduced ejection fraction)    Acute kidney injury superimposed on CKD    Bacteremia    History of benign brain tumor    Cataract    History of biopsy    Cardiac pacemaker    S/P angiogram of extremity    No significant past surgical history    FEVER BODY ACHES    66    Acute on chronic HFrEF (heart failure with reduced ejection fraction)    SysAdmin_VisitLink        Allergies:    No Known Allergies      Labs:        Micro:        Physical Exam:    Gen: NAD  HEENT: AT  CV: No obvious murmurs  Lungs: CTA B/L  Abd: Soft, NT  Ext: No edema  Neuro: CN II - XII grossly intact  Mental status: At baseline    Radiology:      Medications:    acetaminophen     Tablet .. 650 milliGRAM(s) Oral every 6 hours PRN  albuterol/ipratropium for Nebulization 3 milliLiter(s) Nebulizer every 6 hours  aluminum hydroxide/magnesium hydroxide/simethicone Suspension 30 milliLiter(s) Oral every 4 hours PRN  aspirin enteric coated 81 milliGRAM(s) Oral daily  atorvastatin 80 milliGRAM(s) Oral at bedtime  chlorhexidine 2% Cloths 1 Application(s) Topical daily  dextrose 5%. 1000 milliLiter(s) IV Continuous <Continuous>  dextrose 5%. 1000 milliLiter(s) IV Continuous <Continuous>  dextrose 50% Injectable 25 Gram(s) IV Push once  dextrose 50% Injectable 12.5 Gram(s) IV Push once  dextrose 50% Injectable 25 Gram(s) IV Push once  dextrose Oral Gel 15 Gram(s) Oral once PRN  ertapenem  IVPB 500 milliGRAM(s) IV Intermittent every 24 hours  ferrous    sulfate 325 milliGRAM(s) Oral daily  gabapentin 400 milliGRAM(s) Oral at bedtime  glucagon  Injectable 1 milliGRAM(s) IntraMuscular once  HYDROmorphone  Injectable 0.5 milliGRAM(s) IV Push once  insulin glargine Injectable (LANTUS) 26 Unit(s) SubCutaneous at bedtime  insulin lispro (ADMELOG) corrective regimen sliding scale   SubCutaneous three times a day before meals  melatonin 3 milliGRAM(s) Oral at bedtime PRN  midodrine. 5 milliGRAM(s) Oral every 8 hours  ondansetron Injectable 4 milliGRAM(s) IV Push every 8 hours PRN  oxyCODONE    IR 5 milliGRAM(s) Oral every 6 hours PRN  ranolazine 500 milliGRAM(s) Oral two times a day  sodium bicarbonate 1300 milliGRAM(s) Oral three times a day  sodium chloride 2% . 1000 milliLiter(s) IV Continuous <Continuous>  sodium chloride 2% . 1000 milliLiter(s) IV Continuous <Continuous>  ticagrelor 90 milliGRAM(s) Oral two times a day  urea Oral Powder 15 Gram(s) Oral daily

## 2025-03-25 NOTE — PROGRESS NOTE ADULT - SUBJECTIVE AND OBJECTIVE BOX
SHIEREN CASH Patient is a 74y old  Female who presents with a chief complaint of    HPI:  75 y/o female with PMH of HTN, HLD, CAD s/p PCI, HFrEF, Mobitz II s/p PPM (MD NereydaT), PAD with chronic right LE wound, CVA, DM2, CKD3 came to the ED complaining of chest pressure. Patient reported midsternal pressure, non-radiating going for couple of days. As per granddaughter (Lexi) at bedside, patient has been complaining of cough (dry), fever and body aches x 3 days. Lexi also said she got a call from cardiology saying to increase her to Lasix 40mg to tid (from bid) because her CardioMems was not at goal; which she has been doing for the past 2 days. She went to see her PCP yesterday for the fever and body ache, noted to have weight gain, PCP spoke to cardiology who advise to come to the ED. Patient has no shortness of breath, palpitation, nausea, vomiting, abdominal pain, change in bowel/urinary habit, recent travel, sick contact.        (20 Mar 2025 02:51)    The patient was seen and evaluated   The patient is in no acute distress.  Denied any fever chest pain, palpitations, shortness of breath, abdominal pain, fever, dysuria, cough, edema   Complains of     I&O's Summary    24 Mar 2025 07:01  -  25 Mar 2025 07:00  --------------------------------------------------------  IN: 1270 mL / OUT: 450 mL / NET: 820 mL    25 Mar 2025 07:01  -  25 Mar 2025 18:07  --------------------------------------------------------  IN: 0 mL / OUT: 500 mL / NET: -500 mL      Allergies    No Known Allergies    Intolerances      HEALTH ISSUES - PROBLEM Dx:  Non-ST elevation MI (NSTEMI)    Acute on chronic HFrEF (heart failure with reduced ejection fraction)    Acute kidney injury superimposed on CKD    Bacteremia          PAST MEDICAL & SURGICAL HISTORY:  DM (diabetes mellitus)      HTN (hypertension)      H/O gastroesophageal reflux (GERD)      Cardiac pacemaker  MICRA for mobitz type 11      CVA (cerebrovascular accident)  resdiual right sided weakness      CVA (cerebrovascular accident)      PAD (peripheral artery disease)      Wound of right leg      History of benign brain tumor      Cataract      History of biopsy      Cardiac pacemaker      S/P angiogram of extremity              Vital Signs Last 24 Hrs  T(C): 36.3 (25 Mar 2025 15:23), Max: 38.6 (24 Mar 2025 20:00)  T(F): 97.4 (25 Mar 2025 15:23), Max: 101.5 (24 Mar 2025 20:00)  HR: 74 (25 Mar 2025 14:35) (62 - 78)  BP: 112/56 (25 Mar 2025 14:00) (90/45 - 132/50)  BP(mean): 73 (25 Mar 2025 14:00) (57 - 88)  RR: 20 (25 Mar 2025 14:00) (18 - 20)  SpO2: 98% (25 Mar 2025 14:00) (96% - 100%)    Parameters below as of 25 Mar 2025 14:35  Patient On (Oxygen Delivery Method): room air    T(C): 36.3 (03-25-25 @ 15:23), Max: 38.6 (03-24-25 @ 20:00)  HR: 74 (03-25-25 @ 14:35) (62 - 78)  BP: 112/56 (03-25-25 @ 14:00) (90/45 - 132/50)  RR: 20 (03-25-25 @ 14:00) (18 - 20)  SpO2: 98% (03-25-25 @ 14:00) (96% - 100%)  Wt(kg): --    PHYSICAL EXAM:    GENERAL: NAD  HEAD:  Atraumatic, Normocephalic  EYES: EOMI, conjunctiva and sclera clear  ENMT:  Moist mucous membranes,  NERVOUS SYSTEM:  Alert & Oriented X3,  Moves upper and lower extremities; CNS-II-XII  CHEST/LUNG: Clear to auscultation bilaterally; No rales, rhonchi, wheezing,   HEART: Regular rate and rhythm;   ABDOMEN: Soft, Nontender, Nondistended; Bowel sounds present  EXTREMITIES:  Peripheral Pulses, No  cyanosis, or edema  psychiatry- mood and affect appropriate,    acetaminophen     Tablet .. 650 milliGRAM(s) Oral every 6 hours PRN  albuterol/ipratropium for Nebulization 3 milliLiter(s) Nebulizer every 6 hours  aluminum hydroxide/magnesium hydroxide/simethicone Suspension 30 milliLiter(s) Oral every 4 hours PRN  aspirin enteric coated 81 milliGRAM(s) Oral daily  atorvastatin 80 milliGRAM(s) Oral at bedtime  calcitriol   Capsule 0.25 MICROGram(s) Oral daily  chlorhexidine 2% Cloths 1 Application(s) Topical daily  dextrose 5%. 1000 milliLiter(s) IV Continuous <Continuous>  dextrose 5%. 1000 milliLiter(s) IV Continuous <Continuous>  dextrose 50% Injectable 25 Gram(s) IV Push once  dextrose 50% Injectable 12.5 Gram(s) IV Push once  dextrose 50% Injectable 25 Gram(s) IV Push once  dextrose Oral Gel 15 Gram(s) Oral once PRN  ertapenem  IVPB 500 milliGRAM(s) IV Intermittent every 24 hours  ferrous    sulfate 325 milliGRAM(s) Oral daily  gabapentin 400 milliGRAM(s) Oral at bedtime  glucagon  Injectable 1 milliGRAM(s) IntraMuscular once  HYDROmorphone  Injectable 0.5 milliGRAM(s) IV Push once  insulin glargine Injectable (LANTUS) 26 Unit(s) SubCutaneous at bedtime  insulin lispro (ADMELOG) corrective regimen sliding scale   SubCutaneous three times a day before meals  iron sucrose IVPB 200 milliGRAM(s) IV Intermittent every 24 hours  melatonin 3 milliGRAM(s) Oral at bedtime PRN  midodrine. 5 milliGRAM(s) Oral every 8 hours  ondansetron Injectable 4 milliGRAM(s) IV Push every 8 hours PRN  oxyCODONE    IR 5 milliGRAM(s) Oral every 6 hours PRN  ranolazine 500 milliGRAM(s) Oral two times a day  sodium bicarbonate 1300 milliGRAM(s) Oral three times a day  sodium chloride 0.45% 1000 milliLiter(s) IV Continuous <Continuous>  sodium chloride 2% . 1000 milliLiter(s) IV Continuous <Continuous>  sodium chloride 2% . 1000 milliLiter(s) IV Continuous <Continuous>  ticagrelor 90 milliGRAM(s) Oral two times a day  urea Oral Powder 15 Gram(s) Oral daily      LABS:                          8.5    13.44 )-----------( 344      ( 25 Mar 2025 08:09 )             25.5     03-25    126[L]  |  93[L]  |  82.1[H]  ----------------------------<  182[H]  4.4   |  17.0[L]  |  3.54[H]    Ca    6.9[L]      25 Mar 2025 08:09  Phos  3.8     03-25  Mg     2.2     03-25    TPro  6.1[L]  /  Alb  2.8[L]  /  TBili  0.7  /  DBili  x   /  AST  38[H]  /  ALT  20  /  AlkPhos  188[H]  03-25    LIVER FUNCTIONS - ( 25 Mar 2025 01:20 )  Alb: 2.8 g/dL / Pro: 6.1 g/dL / ALK PHOS: 188 U/L / ALT: 20 U/L / AST: 38 U/L / GGT: x                 Urinalysis Basic - ( 25 Mar 2025 08:09 )    Color: x / Appearance: x / SG: x / pH: x  Gluc: 182 mg/dL / Ketone: x  / Bili: x / Urobili: x   Blood: x / Protein: x / Nitrite: x   Leuk Esterase: x / RBC: x / WBC x   Sq Epi: x / Non Sq Epi: x / Bacteria: x      CAPILLARY BLOOD GLUCOSE      POCT Blood Glucose.: 167 mg/dL (25 Mar 2025 12:25)  POCT Blood Glucose.: 181 mg/dL (25 Mar 2025 09:11)  POCT Blood Glucose.: 228 mg/dL (24 Mar 2025 21:15)      RADIOLOGY & ADDITIONAL TESTS:      Consultant notes reviewed  I independently reviwed all images/ labarotory results /cultures/ telemetry/EKG and my findings are indicated above  and discussed care plan with consultants/nursing/ family /patient

## 2025-03-25 NOTE — PROGRESS NOTE ADULT - SUBJECTIVE AND OBJECTIVE BOX
[facility] - PROGRESS NOTE  [address] [place] [address]  Office Phone: [phone]/Fax: [phone]  Service/On Call Phone [phone]  _______________________________________________________________________________________________________    Subjective:  - Moved to stepdown yesterday.  - Patient states she feels better today. Denies any SOB at rest, orthopnea, PND, CP, palpitations, LH/dizziness.    Medications:  acetaminophen     Tablet .. 650 milliGRAM(s) Oral every 6 hours PRN  albuterol/ipratropium for Nebulization 3 milliLiter(s) Nebulizer every 6 hours  aluminum hydroxide/magnesium hydroxide/simethicone Suspension 30 milliLiter(s) Oral every 4 hours PRN  aspirin enteric coated 81 milliGRAM(s) Oral daily  atorvastatin 80 milliGRAM(s) Oral at bedtime  chlorhexidine 2% Cloths 1 Application(s) Topical daily  dextrose 5%. 1000 milliLiter(s) IV Continuous <Continuous>  dextrose 5%. 1000 milliLiter(s) IV Continuous <Continuous>  dextrose 50% Injectable 25 Gram(s) IV Push once  dextrose 50% Injectable 12.5 Gram(s) IV Push once  dextrose 50% Injectable 25 Gram(s) IV Push once  dextrose Oral Gel 15 Gram(s) Oral once PRN  ertapenem  IVPB 500 milliGRAM(s) IV Intermittent every 24 hours  ferrous    sulfate 325 milliGRAM(s) Oral daily  gabapentin 400 milliGRAM(s) Oral at bedtime  glucagon  Injectable 1 milliGRAM(s) IntraMuscular once  HYDROmorphone  Injectable 0.5 milliGRAM(s) IV Push once  insulin glargine Injectable (LANTUS) 26 Unit(s) SubCutaneous at bedtime  insulin lispro (ADMELOG) corrective regimen sliding scale   SubCutaneous three times a day before meals  melatonin 3 milliGRAM(s) Oral at bedtime PRN  midodrine. 5 milliGRAM(s) Oral every 8 hours  ondansetron Injectable 4 milliGRAM(s) IV Push every 8 hours PRN  oxyCODONE    IR 5 milliGRAM(s) Oral every 6 hours PRN  ranolazine 500 milliGRAM(s) Oral two times a day  sodium bicarbonate 1300 milliGRAM(s) Oral three times a day  sodium chloride 2% . 1000 milliLiter(s) IV Continuous <Continuous>  sodium chloride 2% . 1000 milliLiter(s) IV Continuous <Continuous>  ticagrelor 90 milliGRAM(s) Oral two times a day  urea Oral Powder 15 Gram(s) Oral daily      ICU Vital Signs Last 24 Hrs  T(C): 36.9, Max: 38.8 ( @ 17:57)  HR: 68 (60 - 76)  BP: 106/49 (88/40 - 132/50)  BP(mean): 66 (57 - 88)  ABP: --  ABP(mean): --  RR: 18 (16 - 20)  SpO2: 98% (95% - 100%)    Weight in k.8 (25)  Weight in k.7 (25)      I&O's Summary Last 24 Hrs    IN: 1270 mL / OUT: 450 mL / NET: 820 mL      Tele:  60s    Physical Exam:    General: No distress. Comfortable.  Neck: JVP mildly elevated.  Respiratory: Clear to auscultation bilaterally  CV: RRR. Normal S1 and S2. No murmurs, rub, or gallops. Radial pulses normal.  Abdomen: Soft, non-distended, non-tender  Extremities: Warm, no edema  Neurology: Non-focal, alert and oriented times three.   Psych: Affect normal    Labs:    ( 25 @ 08:09 )               8.5    13.44 )--------( 344                  25.5     ( 25 @ 08:09 )     126  |  93  |  82.1  ---------------------<  182  4.4  |  17.0  |  3.54    Ca 6.9  Phos 3.8  Mg 2.2    ( 25 @ 01:20 )  TPro  6.1  /  Alb  2.8  /  TBili  0.7  /  DBili  x   /  AST  38  /  ALT  20  /  AlkPhos  188  ( 25 @ 06:15 )  TPro  7.0  /  Alb  3.1  /  TBili  0.7  /  DBili  x   /  AST  20  /  ALT  11  /  AlkPhos  103    ( 25 @ 00:20 )  TropHS 854   / CK x     / CKMB x      ( 25 @ 22:08 )  TropHS 906   / CK x     / CKMB x        VBG - ( 25 @ 01:20 )  pH: 7.310 / pCO2: 35    / pO2: 53    / Oxygen saturation: 88.3      Blood Gas Venous - Lactate: 1.20 (25 @ 01:20)  Lactate, Blood: 1.7 (25 @ 14:51)

## 2025-03-25 NOTE — PROGRESS NOTE ADULT - ASSESSMENT
Gen Cards: Dr. Allen  HF: Dr. Polanco    75 y/o female with PMH of ICM/HFrEF (LVEF 35-40%), CAD s/p PCI, Mobitz II s/p PPM (MD NereydaT), HTN, HLD, PAD with chronic right LE wound, DM2, and CKD3, who presented to the ED on 3/19 with c/o CP, found to have NSTEMI.     She is s/p LHC on 3/21 which showed mid-distal LAD severe stenosis, OM1 and OM2 severe stenosis, mid ISR 50%, distal ISR 99%, s/p PCI with 1 ZEINAB. Her PAD on her CardioMEMS was 22 on 3/21 (goal 14) so her diuretics were resumed with Lasix 40 mg IV BID. Over the weekend she had a fever with leukocytosis and her blood cultures are positive for E. Coli/ESBL. Her CT A/P showed left pyelonephritis. She also has an BRADY for which her diuretics and Entresto were held.

## 2025-03-25 NOTE — PROGRESS NOTE ADULT - TIME BILLING
For chart review, face to face evaluation, counselling and care coordination
For chart review, face to face evaluation, counselling and care coordination
Chart/notes review, interpretation of laboratory and imaging results, patient assessment, documentation  Review of microbiological data and adjustment of antibiotics where necessary
reviewing labs, notes, orders, radiographic studies, as well as counseling and coordinating care with the relevant multidisciplinary team, including with the primary and consulting providers.

## 2025-03-25 NOTE — PROGRESS NOTE ADULT - NS ATTEND AMEND GEN_ALL_CORE FT
Ms. Ko is a 75 y/o Kyrgyz speaking female with PMH of ICM/HFrEF (LVEF 35-40%), CAD s/p PCI, Mobitz II s/p PPM (MIRNA Dawn), HTN, HLD, PAD with chronic right LE wound, DM2, and CKD3, who presented to the ED on 3/19 with c/o CP, found to have NSTEMI. She underwent PCI to her RCA for ISR on Friday 3/21/25. Over the next couple of days she developed fever, leukocytosis and worsening renal function. Her blood cultures are positive for ESBL E.coli and CT abdomen is concerning for left pyelonephritis.     Continue holding her home dose of Toprol, Entresto, aldactone in the setting of active sepsis, hypotension and renal dysfunction.  She is on midodrine which we would like to wean if her blood pressure remains stable. We will reinitiate GDMT if blood pressure remains stable off midodrine and once renal function improves.

## 2025-03-25 NOTE — PROVIDER CONTACT NOTE (CRITICAL VALUE NOTIFICATION) - ACTION/TREATMENT ORDERED:
Calcium Gluconate IVPB
ALEXIS Aranda will contact Nephro and make them aware of critical sodium and calcium.
ALEXIS Aranda will contact ICU to make them aware of Sodium.
awaiting orders.

## 2025-03-25 NOTE — PROGRESS NOTE ADULT - PROBLEM SELECTOR PLAN 1
- Etiology: ICM, LVEF 35-40%.  - Clinically close to euvolemic w/ warm extremities.   - GDMT: Held for BRADY/hypotension. Now on midodrine 5 mg TID. Will try to wean once infection improves (home medications are Toprol  mg daily, Entresto  mg BID, and Farxiga 10 mg daily. No MRA 2/2 hyperkalemia). Hold Farxiga 2/2 pyelonephritis.   - Diuretics: She received 1 dose of Lasix 80 mg IV overnight. Hold further diuretics for now (home dose Lasix 40 mg BID).  - Please document strict I&Os and daily standing weight.  - Will continue to monitor her CardioMEMS readings. Goal PAD 14. PAD was 23 on 3/24.  - Device: s/p Micra PPM with chronic . Can consider device upgrade to CRT-P in the future as an outpatient.  - Appreciate MICU consult. Patient was moved to stepdown last night.

## 2025-03-25 NOTE — PROGRESS NOTE ADULT - ASSESSMENT
75 y/o female with PMH of HTN, HLD, CAD s/p PCI, HFrEF, Mobitz II s/p PPM (MD NereydaT), PAD with chronic right LE wound, CVA, DM2, CKD3 came to the ED complaining of chest pressure/ SOB    BRADY on CKD suspect stage III   Suspect SALO   S/p cardiac cath on 03/22 PCI to RCA with ZEINAB  Serum Cr 1.6 on 3/22 baseline seems to be 1.5- 1.8  Low BP since admission - ATN?- likely from sepsis w + blood Cx   Has h/o poorly controlled DM likely etiology of CKD  UA w + hematuria/ proteinuria  CT abd noted:  No hydronephrosis - Enlarged edematous L kidney w increased perinephric fat stranding- Likely Pyelonephritis  Noted + Blood Cx--> ESBL Bacteremia--> on IV Abx --> Ertapenem  LVEF 31% on 3/21/25  Agree w holding Farxiga and Entresto for now  Will give short coarse of gentle iVF w added bicarb    Systolic HF exacerbation at admit s/p IV Lasix, diuretics now on hold - dyspnea better    Hypochloridemia likely from diuretics   HypoCalcemia w low albumin corrects to approx 7.4 mg/ dL   Will givw IV supplementation  PTH high suspect 2HPT will start Calcitriol    HypoNatremia- improving  s/p short coarse of 2% saline  Serum Na 123-- >126  Urine studies not so helpful w recent diuretics  cont Urea powder   Caution w correction more than 6-8 mEq/ L per 24 hr period   Cont fluid restriction to diet    MA + CKD  cont NaBicarb po, will add bicarb to IVF    Anemia + CKD, low iron stores will start IV iron     Discussed  at bedside   No urgent need for HD but may need it depending on coarse  Renally dose meds, avoid nephrotoxic agents   Monitor labs, will follow

## 2025-03-26 LAB
-  AMPICILLIN/SULBACTAM: SIGNIFICANT CHANGE UP
-  AMPICILLIN: SIGNIFICANT CHANGE UP
-  AZTREONAM: SIGNIFICANT CHANGE UP
-  CEFAZOLIN: SIGNIFICANT CHANGE UP
-  CEFEPIME: SIGNIFICANT CHANGE UP
-  CEFTRIAXONE: SIGNIFICANT CHANGE UP
-  CEFUROXIME: SIGNIFICANT CHANGE UP
-  CIPROFLOXACIN: SIGNIFICANT CHANGE UP
-  ERTAPENEM: SIGNIFICANT CHANGE UP
-  GENTAMICIN: SIGNIFICANT CHANGE UP
-  IMIPENEM: SIGNIFICANT CHANGE UP
-  LEVOFLOXACIN: SIGNIFICANT CHANGE UP
-  MEROPENEM: SIGNIFICANT CHANGE UP
-  NITROFURANTOIN: SIGNIFICANT CHANGE UP
-  PIPERACILLIN/TAZOBACTAM: SIGNIFICANT CHANGE UP
-  TOBRAMYCIN: SIGNIFICANT CHANGE UP
-  TRIMETHOPRIM/SULFAMETHOXAZOLE: SIGNIFICANT CHANGE UP
ALBUMIN SERPL ELPH-MCNC: 1.7 G/DL — LOW (ref 3.3–5.2)
ALP SERPL-CCNC: 276 U/L — HIGH (ref 40–120)
ALT FLD-CCNC: 25 U/L — SIGNIFICANT CHANGE UP
ANION GAP SERPL CALC-SCNC: 17 MMOL/L — SIGNIFICANT CHANGE UP (ref 5–17)
APPEARANCE UR: ABNORMAL
AST SERPL-CCNC: 59 U/L — HIGH
BACTERIA # UR AUTO: ABNORMAL /HPF
BILIRUB SERPL-MCNC: 0.7 MG/DL — SIGNIFICANT CHANGE UP (ref 0.4–2)
BILIRUB UR-MCNC: NEGATIVE — SIGNIFICANT CHANGE UP
BUN SERPL-MCNC: 89.9 MG/DL — HIGH (ref 8–20)
CALCIUM SERPL-MCNC: 7.3 MG/DL — LOW (ref 8.4–10.5)
CAST: 5 /LPF — HIGH (ref 0–4)
CHLORIDE SERPL-SCNC: 94 MMOL/L — LOW (ref 96–108)
CO2 SERPL-SCNC: 15 MMOL/L — LOW (ref 22–29)
COLOR SPEC: YELLOW — SIGNIFICANT CHANGE UP
CREAT SERPL-MCNC: 3.46 MG/DL — HIGH (ref 0.5–1.3)
CULTURE RESULTS: ABNORMAL
DIFF PNL FLD: ABNORMAL
EGFR: 14 ML/MIN/1.73M2 — LOW
EGFR: 14 ML/MIN/1.73M2 — LOW
GLUCOSE BLDC GLUCOMTR-MCNC: 157 MG/DL — HIGH (ref 70–99)
GLUCOSE BLDC GLUCOMTR-MCNC: 185 MG/DL — HIGH (ref 70–99)
GLUCOSE BLDC GLUCOMTR-MCNC: 220 MG/DL — HIGH (ref 70–99)
GLUCOSE BLDC GLUCOMTR-MCNC: 233 MG/DL — HIGH (ref 70–99)
GLUCOSE SERPL-MCNC: 167 MG/DL — HIGH (ref 70–99)
GLUCOSE UR QL: 500 MG/DL
HCT VFR BLD CALC: 27 % — LOW (ref 34.5–45)
HGB BLD-MCNC: 9.2 G/DL — LOW (ref 11.5–15.5)
KETONES UR-MCNC: NEGATIVE MG/DL — SIGNIFICANT CHANGE UP
LEUKOCYTE ESTERASE UR-ACNC: ABNORMAL
MCHC RBC-ENTMCNC: 28.5 PG — SIGNIFICANT CHANGE UP (ref 27–34)
MCHC RBC-ENTMCNC: 34.1 G/DL — SIGNIFICANT CHANGE UP (ref 32–36)
MCV RBC AUTO: 83.6 FL — SIGNIFICANT CHANGE UP (ref 80–100)
METHOD TYPE: SIGNIFICANT CHANGE UP
NITRITE UR-MCNC: NEGATIVE — SIGNIFICANT CHANGE UP
NRBC # BLD AUTO: 0 K/UL — SIGNIFICANT CHANGE UP (ref 0–0)
NRBC # FLD: 0 K/UL — SIGNIFICANT CHANGE UP (ref 0–0)
NRBC BLD AUTO-RTO: 0 /100 WBCS — SIGNIFICANT CHANGE UP (ref 0–0)
ORGANISM # SPEC MICROSCOPIC CNT: ABNORMAL
ORGANISM # SPEC MICROSCOPIC CNT: SIGNIFICANT CHANGE UP
PH UR: 6.5 — SIGNIFICANT CHANGE UP (ref 5–8)
PLATELET # BLD AUTO: 394 K/UL — SIGNIFICANT CHANGE UP (ref 150–400)
PMV BLD: 10.5 FL — SIGNIFICANT CHANGE UP (ref 7–13)
POTASSIUM SERPL-MCNC: 5.3 MMOL/L — SIGNIFICANT CHANGE UP (ref 3.5–5.3)
POTASSIUM SERPL-SCNC: 5.3 MMOL/L — SIGNIFICANT CHANGE UP (ref 3.5–5.3)
PROT SERPL-MCNC: 6.2 G/DL — LOW (ref 6.6–8.7)
PROT UR-MCNC: 100 MG/DL
RBC # BLD: 3.23 M/UL — LOW (ref 3.8–5.2)
RBC # FLD: 15.9 % — HIGH (ref 10.3–14.5)
RBC CASTS # UR COMP ASSIST: 26 /HPF — HIGH (ref 0–4)
SODIUM SERPL-SCNC: 125 MMOL/L — LOW (ref 135–145)
SP GR SPEC: 1.01 — SIGNIFICANT CHANGE UP (ref 1–1.03)
SPECIMEN SOURCE: SIGNIFICANT CHANGE UP
SQUAMOUS # UR AUTO: 1 /HPF — SIGNIFICANT CHANGE UP (ref 0–5)
UROBILINOGEN FLD QL: 1 MG/DL — SIGNIFICANT CHANGE UP (ref 0.2–1)
WBC # BLD: 11.92 K/UL — HIGH (ref 3.8–10.5)
WBC # FLD AUTO: 11.92 K/UL — HIGH (ref 3.8–10.5)
WBC UR QL: 804 /HPF — HIGH (ref 0–5)

## 2025-03-26 PROCEDURE — 99232 SBSQ HOSP IP/OBS MODERATE 35: CPT

## 2025-03-26 PROCEDURE — 99233 SBSQ HOSP IP/OBS HIGH 50: CPT

## 2025-03-26 PROCEDURE — G0545: CPT

## 2025-03-26 RX ORDER — CALCIUM CARBONATE 750 MG/1
1 TABLET ORAL THREE TIMES A DAY
Refills: 0 | Status: DISCONTINUED | OUTPATIENT
Start: 2025-03-26 | End: 2025-03-31

## 2025-03-26 RX ORDER — SODIUM CHLORIDE 9 G/1000ML
1000 INJECTION, SOLUTION INTRAVENOUS
Refills: 0 | Status: DISCONTINUED | OUTPATIENT
Start: 2025-03-26 | End: 2025-03-27

## 2025-03-26 RX ORDER — EPOETIN ALFA 10000 [IU]/ML
20000 SOLUTION INTRAVENOUS; SUBCUTANEOUS
Refills: 0 | Status: DISCONTINUED | OUTPATIENT
Start: 2025-03-26 | End: 2025-03-31

## 2025-03-26 RX ORDER — FUROSEMIDE 10 MG/ML
80 INJECTION INTRAMUSCULAR; INTRAVENOUS ONCE
Refills: 0 | Status: COMPLETED | OUTPATIENT
Start: 2025-03-26 | End: 2025-03-26

## 2025-03-26 RX ADMIN — Medication 15 GRAM(S): at 11:16

## 2025-03-26 RX ADMIN — ERTAPENEM SODIUM 100 MILLIGRAM(S): 1 INJECTION, POWDER, LYOPHILIZED, FOR SOLUTION INTRAMUSCULAR; INTRAVENOUS at 08:37

## 2025-03-26 RX ADMIN — IPRATROPIUM BROMIDE AND ALBUTEROL SULFATE 3 MILLILITER(S): .5; 2.5 SOLUTION RESPIRATORY (INHALATION) at 08:23

## 2025-03-26 RX ADMIN — INSULIN LISPRO 2: 100 INJECTION, SOLUTION INTRAVENOUS; SUBCUTANEOUS at 08:36

## 2025-03-26 RX ADMIN — Medication 1300 MILLIGRAM(S): at 05:11

## 2025-03-26 RX ADMIN — Medication 1 APPLICATION(S): at 11:16

## 2025-03-26 RX ADMIN — IRON SUCROSE 110 MILLIGRAM(S): 20 INJECTION, SOLUTION INTRAVENOUS at 17:38

## 2025-03-26 RX ADMIN — Medication 81 MILLIGRAM(S): at 11:16

## 2025-03-26 RX ADMIN — RANOLAZINE 500 MILLIGRAM(S): 1000 TABLET, FILM COATED, EXTENDED RELEASE ORAL at 05:11

## 2025-03-26 RX ADMIN — TICAGRELOR 90 MILLIGRAM(S): 90 TABLET ORAL at 18:32

## 2025-03-26 RX ADMIN — EPOETIN ALFA 20000 UNIT(S): 10000 SOLUTION INTRAVENOUS; SUBCUTANEOUS at 11:17

## 2025-03-26 RX ADMIN — Medication 650 MILLIGRAM(S): at 14:55

## 2025-03-26 RX ADMIN — IPRATROPIUM BROMIDE AND ALBUTEROL SULFATE 3 MILLILITER(S): .5; 2.5 SOLUTION RESPIRATORY (INHALATION) at 14:35

## 2025-03-26 RX ADMIN — Medication 1300 MILLIGRAM(S): at 22:29

## 2025-03-26 RX ADMIN — GABAPENTIN 400 MILLIGRAM(S): 400 CAPSULE ORAL at 22:29

## 2025-03-26 RX ADMIN — INSULIN GLARGINE-YFGN 26 UNIT(S): 100 INJECTION, SOLUTION SUBCUTANEOUS at 22:29

## 2025-03-26 RX ADMIN — FUROSEMIDE 80 MILLIGRAM(S): 10 INJECTION INTRAMUSCULAR; INTRAVENOUS at 14:55

## 2025-03-26 RX ADMIN — TICAGRELOR 90 MILLIGRAM(S): 90 TABLET ORAL at 05:11

## 2025-03-26 RX ADMIN — INSULIN LISPRO 2: 100 INJECTION, SOLUTION INTRAVENOUS; SUBCUTANEOUS at 12:52

## 2025-03-26 RX ADMIN — INSULIN LISPRO 4: 100 INJECTION, SOLUTION INTRAVENOUS; SUBCUTANEOUS at 17:37

## 2025-03-26 RX ADMIN — CALCITRIOL 0.25 MICROGRAM(S): 0.5 CAPSULE, GELATIN COATED ORAL at 11:16

## 2025-03-26 RX ADMIN — CALCIUM CARBONATE 1 TABLET(S): 750 TABLET ORAL at 22:29

## 2025-03-26 RX ADMIN — CALCIUM CARBONATE 1 TABLET(S): 750 TABLET ORAL at 12:52

## 2025-03-26 RX ADMIN — Medication 1300 MILLIGRAM(S): at 12:51

## 2025-03-26 RX ADMIN — RANOLAZINE 500 MILLIGRAM(S): 1000 TABLET, FILM COATED, EXTENDED RELEASE ORAL at 17:38

## 2025-03-26 RX ADMIN — MIDODRINE HYDROCHLORIDE 5 MILLIGRAM(S): 5 TABLET ORAL at 05:11

## 2025-03-26 RX ADMIN — Medication 325 MILLIGRAM(S): at 11:16

## 2025-03-26 RX ADMIN — IPRATROPIUM BROMIDE AND ALBUTEROL SULFATE 3 MILLILITER(S): .5; 2.5 SOLUTION RESPIRATORY (INHALATION) at 22:18

## 2025-03-26 RX ADMIN — MIDODRINE HYDROCHLORIDE 5 MILLIGRAM(S): 5 TABLET ORAL at 12:52

## 2025-03-26 RX ADMIN — ATORVASTATIN CALCIUM 80 MILLIGRAM(S): 80 TABLET, FILM COATED ORAL at 22:29

## 2025-03-26 NOTE — PROGRESS NOTE ADULT - SUBJECTIVE AND OBJECTIVE BOX
SHIREEN CASH Patient is a 74y old  Female who presents with a chief complaint of    HPI:  73 y/o female with PMH of HTN, HLD, CAD s/p PCI, HFrEF, Mobitz II s/p PPM (MIRNA Dawn), PAD with chronic right LE wound, CVA, DM2, CKD3 came to the ED complaining of chest pressure. Patient reported midsternal pressure, non-radiating going for couple of days. As per granddaughter (Lexi) at bedside, patient has been complaining of cough (dry), fever and body aches x 3 days. Lexi also said she got a call from cardiology saying to increase her to Lasix 40mg to tid (from bid) because her CardioMems was not at goal; which she has been doing for the past 2 days. She went to see her PCP yesterday for the fever and body ache, noted to have weight gain, PCP spoke to cardiology who advise to come to the ED. Patient has no shortness of breath, palpitation, nausea, vomiting, abdominal pain, change in bowel/urinary habit, recent travel, sick contact.        (20 Mar 2025 02:51)    The patient was seen and evaluated feeling better  The patient is in no acute distress.  Denied any fever, abdominal pain, fever, dysuria edema       I&O's Summary    25 Mar 2025 07:01  -  26 Mar 2025 07:00  --------------------------------------------------------  IN: 1660 mL / OUT: 1800 mL / NET: -140 mL    26 Mar 2025 07:01  -  26 Mar 2025 16:56  --------------------------------------------------------  IN: 615 mL / OUT: 600 mL / NET: 15 mL      Allergies    No Known Allergies    Intolerances      HEALTH ISSUES - PROBLEM Dx:  Non-ST elevation MI (NSTEMI)    Acute on chronic HFrEF (heart failure with reduced ejection fraction)    Acute kidney injury superimposed on CKD    Bacteremia          PAST MEDICAL & SURGICAL HISTORY:  DM (diabetes mellitus)      HTN (hypertension)      H/O gastroesophageal reflux (GERD)      Cardiac pacemaker  MICRA for mobitz type 11      CVA (cerebrovascular accident)  resdiual right sided weakness      CVA (cerebrovascular accident)      PAD (peripheral artery disease)      Wound of right leg      History of benign brain tumor      Cataract      History of biopsy      Cardiac pacemaker      S/P angiogram of extremity              Vital Signs Last 24 Hrs  T(C): 38 (26 Mar 2025 16:04), Max: 38.3 (25 Mar 2025 20:00)  T(F): 100.4 (26 Mar 2025 16:04), Max: 100.9 (25 Mar 2025 20:00)  HR: 66 (26 Mar 2025 16:04) (63 - 78)  BP: 97/41 (26 Mar 2025 16:04) (96/51 - 141/50)  BP(mean): 58 (26 Mar 2025 16:04) (55 - 84)  RR: 18 (26 Mar 2025 16:04) (18 - 18)  SpO2: 95% (26 Mar 2025 16:04) (95% - 100%)    Parameters below as of 26 Mar 2025 16:04  Patient On (Oxygen Delivery Method): room air    T(C): 38 (03-26-25 @ 16:04), Max: 38.3 (03-25-25 @ 20:00)  HR: 66 (03-26-25 @ 16:04) (63 - 78)  BP: 97/41 (03-26-25 @ 16:04) (96/51 - 141/50)  RR: 18 (03-26-25 @ 16:04) (18 - 18)  SpO2: 95% (03-26-25 @ 16:04) (95% - 100%)  Wt(kg): --    PHYSICAL EXAM:    GENERAL: NAD  HEAD:  Atraumatic, Normocephalic  EYES: EOMI, conjunctiva and sclera clear  ENMT:  Moist mucous membranes,  NERVOUS SYSTEM:  Alert & Oriented X3,  Moves upper and lower extremities; CNS-II-XII  CHEST/LUNG: Clear to auscultation bilaterally; No rales, rhonchi, wheezing,   HEART: Regular rate and rhythm;   ABDOMEN: Soft, Nontender, Nondistended; Bowel sounds present  EXTREMITIES:  Peripheral Pulses, No  cyanosis, or edema  psychiatry- mood and affect appropriate, Insight and judgement intact     acetaminophen     Tablet .. 650 milliGRAM(s) Oral every 6 hours PRN  albuterol/ipratropium for Nebulization 3 milliLiter(s) Nebulizer every 6 hours  aluminum hydroxide/magnesium hydroxide/simethicone Suspension 30 milliLiter(s) Oral every 4 hours PRN  aspirin enteric coated 81 milliGRAM(s) Oral daily  atorvastatin 80 milliGRAM(s) Oral at bedtime  calcitriol   Capsule 0.25 MICROGram(s) Oral daily  calcium carbonate   1250 mG (OsCal) 1 Tablet(s) Oral three times a day  chlorhexidine 2% Cloths 1 Application(s) Topical daily  dextrose 5%. 1000 milliLiter(s) IV Continuous <Continuous>  dextrose 5%. 1000 milliLiter(s) IV Continuous <Continuous>  dextrose 50% Injectable 25 Gram(s) IV Push once  dextrose 50% Injectable 12.5 Gram(s) IV Push once  dextrose 50% Injectable 25 Gram(s) IV Push once  dextrose Oral Gel 15 Gram(s) Oral once PRN  epoetin kristina-epbx (RETACRIT) Injectable 03539 Unit(s) SubCutaneous <User Schedule>  ertapenem  IVPB 500 milliGRAM(s) IV Intermittent every 24 hours  ferrous    sulfate 325 milliGRAM(s) Oral daily  gabapentin 400 milliGRAM(s) Oral at bedtime  glucagon  Injectable 1 milliGRAM(s) IntraMuscular once  HYDROmorphone  Injectable 0.5 milliGRAM(s) IV Push once  insulin glargine Injectable (LANTUS) 26 Unit(s) SubCutaneous at bedtime  insulin lispro (ADMELOG) corrective regimen sliding scale   SubCutaneous three times a day before meals  iron sucrose IVPB 200 milliGRAM(s) IV Intermittent every 24 hours  melatonin 3 milliGRAM(s) Oral at bedtime PRN  ondansetron Injectable 4 milliGRAM(s) IV Push every 8 hours PRN  oxyCODONE    IR 5 milliGRAM(s) Oral every 6 hours PRN  ranolazine 500 milliGRAM(s) Oral two times a day  sodium bicarbonate 1300 milliGRAM(s) Oral three times a day  sodium chloride 0.45% 1000 milliLiter(s) IV Continuous <Continuous>  sodium chloride 2% . 1000 milliLiter(s) IV Continuous <Continuous>  sodium chloride 2% . 1000 milliLiter(s) IV Continuous <Continuous>  ticagrelor 90 milliGRAM(s) Oral two times a day  urea Oral Powder 15 Gram(s) Oral daily      LABS:                          9.2    11.92 )-----------( 394      ( 26 Mar 2025 11:34 )             27.0     03-26    125[L]  |  94[L]  |  89.9[H]  ----------------------------<  167[H]  5.3   |  15.0[L]  |  3.46[H]    Ca    7.3[L]      26 Mar 2025 06:09  Phos  3.8     03-25  Mg     2.2     03-25    TPro  6.2[L]  /  Alb  1.7[L]  /  TBili  0.7  /  DBili  x   /  AST  59[H]  /  ALT  25  /  AlkPhos  276[H]  03-26    LIVER FUNCTIONS - ( 26 Mar 2025 06:09 )  Alb: 1.7 g/dL / Pro: 6.2 g/dL / ALK PHOS: 276 U/L / ALT: 25 U/L / AST: 59 U/L / GGT: x                 Urinalysis Basic - ( 26 Mar 2025 06:09 )    Color: x / Appearance: x / SG: x / pH: x  Gluc: 167 mg/dL / Ketone: x  / Bili: x / Urobili: x   Blood: x / Protein: x / Nitrite: x   Leuk Esterase: x / RBC: x / WBC x   Sq Epi: x / Non Sq Epi: x / Bacteria: x      CAPILLARY BLOOD GLUCOSE      POCT Blood Glucose.: 185 mg/dL (26 Mar 2025 12:40)  POCT Blood Glucose.: 157 mg/dL (26 Mar 2025 08:34)  POCT Blood Glucose.: 279 mg/dL (25 Mar 2025 21:20)  POCT Blood Glucose.: 266 mg/dL (25 Mar 2025 18:25)      RADIOLOGY & ADDITIONAL TESTS:      Consultant notes reviewed  I independently reviwed all images/ labarotory results /cultures/ telemetry/EKG and my findings are indicated above  and discussed care plan with consultants/nursing/ family /patient

## 2025-03-26 NOTE — PROGRESS NOTE ADULT - ASSESSMENT
74 F PMH HTN, hyperlipidemia, CAD, CHF s/p CardioMEMS, Mobitz II s/p pacemaker, PAD, CVA, DM2 (A1c 8.5), CKD3, who presented here with chest pressure.     Acute on chronic CHF exacerbation  NSTEMI s/p cardiac cath with ZEINAB to RCA on March 22nd  Course complicated by  Fevers  Leukocytosis  L sided flank pain  Abnormal UA with pyuria and bacteriuria   ESBL E Coli bacteremia   Abnormal CT, reviewed by myself, with evidence of L sided pyelonephritis (enlarged kidney with perinephric stranding)   Severe sepsis   Hospital course further complicated by acute renal failure and hyponatremia     Suggest;  Continue Ertapenem 500mg IV Q24h   Urine culture with >100k ESBL Coli  Blood cultures from 3/22 with high grade ESBL E Coli bacteremia   Repeat blood cultures from 3/24 are negative to date   Monitor fever curve   Trend WBC count   Monitor kidney function daily while on IV antibiotics   Management of BRADY on CKD and hyponatremia as per Primary Team / Nephrology     Can complete Ertapenem course through March 30th 2025 and then stop antibiotics     Thank you for this consult. Inpatient ID consult team will sign off.  Further changes in lab values, imaging studies, or clinical status will not be known to ID inpatient consultants unless specifically communicated by primary team.

## 2025-03-26 NOTE — PROGRESS NOTE ADULT - PROBLEM SELECTOR PLAN 1
- Etiology: ICM, LVEF 35-40%.  - Clinically mildly hypervolemic w/ warm extremities.   - GDMT: Held for BRADY/hypotension. Midodrine stopped today. Will try to reintroduce GDMT if BP remains stable off midodrine (home medications are Toprol  mg daily, Entresto  mg BID, and Farxiga 10 mg daily. No MRA 2/2 hyperkalemia). Hold Farxiga 2/2 pyelonephritis.   - Diuretics: She received 1 dose of Lasix 80 mg IV on 3/24. Will give another dose today (home dose Lasix 40 mg BID).  - Please document strict I&Os and daily standing weight.  - Will continue to monitor her CardioMEMS readings. Goal PAD 14. PAD was 23 on 3/24. Will recheck tomorrow.  - Device: s/p Micra PPM with chronic . Can consider device upgrade to CRT-P in the future as an outpatient.  - Appreciate MICU consult. Patient was moved to stepdown.

## 2025-03-26 NOTE — PROGRESS NOTE ADULT - SUBJECTIVE AND OBJECTIVE BOX
[facility]ART FAILURE - PROGRESS NOTE  [address] [place] [address]  Office Phone: [phone]/Fax: [phone]  Service/On Call Phone [phone]  _______________________________________________________________________________________________________    Subjective:  - NAEO  - Patient states that she feels well and denies any SOB at rest, orthopnea, PND, CP, palpitations, LH/dizziness.    Medications:  acetaminophen     Tablet .. 650 milliGRAM(s) Oral every 6 hours PRN  albuterol/ipratropium for Nebulization 3 milliLiter(s) Nebulizer every 6 hours  aluminum hydroxide/magnesium hydroxide/simethicone Suspension 30 milliLiter(s) Oral every 4 hours PRN  aspirin enteric coated 81 milliGRAM(s) Oral daily  atorvastatin 80 milliGRAM(s) Oral at bedtime  calcitriol   Capsule 0.25 MICROGram(s) Oral daily  calcium carbonate   1250 mG (OsCal) 1 Tablet(s) Oral three times a day  chlorhexidine 2% Cloths 1 Application(s) Topical daily  dextrose 5%. 1000 milliLiter(s) IV Continuous <Continuous>  dextrose 5%. 1000 milliLiter(s) IV Continuous <Continuous>  dextrose 50% Injectable 25 Gram(s) IV Push once  dextrose 50% Injectable 12.5 Gram(s) IV Push once  dextrose 50% Injectable 25 Gram(s) IV Push once  dextrose Oral Gel 15 Gram(s) Oral once PRN  epoetin kristina-epbx (RETACRIT) Injectable 03844 Unit(s) SubCutaneous <User Schedule>  ertapenem  IVPB 500 milliGRAM(s) IV Intermittent every 24 hours  ferrous    sulfate 325 milliGRAM(s) Oral daily  furosemide   Injectable 80 milliGRAM(s) IV Push once  gabapentin 400 milliGRAM(s) Oral at bedtime  glucagon  Injectable 1 milliGRAM(s) IntraMuscular once  HYDROmorphone  Injectable 0.5 milliGRAM(s) IV Push once  insulin glargine Injectable (LANTUS) 26 Unit(s) SubCutaneous at bedtime  insulin lispro (ADMELOG) corrective regimen sliding scale   SubCutaneous three times a day before meals  iron sucrose IVPB 200 milliGRAM(s) IV Intermittent every 24 hours  melatonin 3 milliGRAM(s) Oral at bedtime PRN  ondansetron Injectable 4 milliGRAM(s) IV Push every 8 hours PRN  oxyCODONE    IR 5 milliGRAM(s) Oral every 6 hours PRN  ranolazine 500 milliGRAM(s) Oral two times a day  sodium bicarbonate 1300 milliGRAM(s) Oral three times a day  sodium chloride 0.45% 1000 milliLiter(s) IV Continuous <Continuous>  sodium chloride 2% . 1000 milliLiter(s) IV Continuous <Continuous>  sodium chloride 2% . 1000 milliLiter(s) IV Continuous <Continuous>  ticagrelor 90 milliGRAM(s) Oral two times a day  urea Oral Powder 15 Gram(s) Oral daily      ICU Vital Signs Last 24 Hrs  T(C): 37.8, Max: 38.3 ( @ 20:00)  HR: 74 (63 - 78)  BP: 107/52 (96/51 - 141/50)  BP(mean): 68 (55 - 84)  ABP: --  ABP(mean): --  RR: 18 (18 - 20)  SpO2: 100% (95% - 100%)    Weight in k.3 (25)  Weight in k.4 (25)      I&O's Summary Last 24 Hrs    IN: 1660 mL / OUT: 1800 mL / NET: -140 mL      Tele:  60-70s    Physical Exam:    General: No distress. Comfortable.  Neck: JVP elevated.  Respiratory: Clear to auscultation bilaterally  CV: RRR. Normal S1 and S2. No murmurs, rub, or gallops. Radial pulses normal.  Abdomen: Soft, non-distended, non-tender  Extremities: Warm, no edema  Neurology: Non-focal, alert and oriented times three.   Psych: Affect normal    Labs:    ( 25 @ 11:34 )               9.2    11.92 )--------( 394                  27.0     ( 25 @ 06:09 )     125  |  94  |  89.9  ---------------------<  167  5.3  |  15.0  |  3.46    Ca 7.3  Phos x   Mg x     ( 25 @ 06:09 )  TPro  6.2  /  Alb  1.7  /  TBili  0.7  /  DBili  x   /  AST  59  /  ALT  25  /  AlkPhos  276  ( 25 @ 01:20 )  TPro  6.1  /  Alb  2.8  /  TBili  0.7  /  DBili  x   /  AST  38  /  ALT  20  /  AlkPhos  188    ( 25 @ 00:20 )  TropHS 854   / CK x     / CKMB x      ( 25 @ 22:08 )  TropHS 906   / CK x     / CKMB x

## 2025-03-26 NOTE — PROGRESS NOTE ADULT - ASSESSMENT
Gen Cards: Dr. Allen  HF: Dr. Polanco    75 y/o female with PMH of ICM/HFrEF (LVEF 35-40%), CAD s/p PCI, Mobitz II s/p PPM (MD NereydaT), HTN, HLD, PAD with chronic right LE wound, DM2, and CKD3, who presented to the ED on 3/19 with c/o CP, found to have NSTEMI.     She is s/p LHC on 3/21 which showed mid-distal LAD severe stenosis, OM1 and OM2 severe stenosis, mid ISR 50%, distal ISR 99%, s/p PCI with 1 ZEINAB. Her PAD on her CardioMEMS was 22 on 3/21 (goal 14) so her diuretics were resumed with Lasix 40 mg IV BID. On 3/22 she had a fever with leukocytosis and her blood cultures are positive for E. Coli/ESBL. Her CT A/P showed left pyelonephritis. She also has an BRADY for which her diuretics and Entresto were held.

## 2025-03-26 NOTE — PROGRESS NOTE ADULT - NS ATTEND AMEND GEN_ALL_CORE FT
She is scheduled   Ms. Ko is a 73 y/o Croatian speaking female with PMH of ICM/HFrEF (LVEF 35-40%), CAD s/p PCI, Mobitz II s/p PPM (MIRNA Dawn), HTN, HLD, PAD with chronic right LE wound, DM2, and CKD3, who presented to the ED on 3/19 with c/o CP, found to have NSTEMI. She underwent PCI to her RCA for ISR on Friday 3/21/25. Over the next couple of days she developed fever, leukocytosis and worsening renal function. Her blood cultures are positive for ESBL E.coli and CT abdomen is concerning for left pyelonephritis.     Continue holding her home dose of Toprol, Entresto, aldactone in the setting of active sepsis, hypotension and renal dysfunction. Midodrine weaned off. We will reinitiate GDMT if blood pressure remains stable off midodrine and once renal function improves. Stop IV fluids. We will interrogate her cardiomems tomorrow.

## 2025-03-26 NOTE — PROGRESS NOTE ADULT - SUBJECTIVE AND OBJECTIVE BOX
NEPHROLOGY INTERVAL HPI/OVERNIGHT EVENTS:  pt resting when seen earlier today  no acute distress appreciated    MEDICATIONS  (STANDING):  albuterol/ipratropium for Nebulization 3 milliLiter(s) Nebulizer every 6 hours  aspirin enteric coated 81 milliGRAM(s) Oral daily  atorvastatin 80 milliGRAM(s) Oral at bedtime  calcitriol   Capsule 0.25 MICROGram(s) Oral daily  chlorhexidine 2% Cloths 1 Application(s) Topical daily  dextrose 5%. 1000 milliLiter(s) (100 mL/Hr) IV Continuous <Continuous>  dextrose 5%. 1000 milliLiter(s) (50 mL/Hr) IV Continuous <Continuous>  dextrose 50% Injectable 25 Gram(s) IV Push once  dextrose 50% Injectable 12.5 Gram(s) IV Push once  dextrose 50% Injectable 25 Gram(s) IV Push once  ertapenem  IVPB 500 milliGRAM(s) IV Intermittent every 24 hours  ferrous    sulfate 325 milliGRAM(s) Oral daily  gabapentin 400 milliGRAM(s) Oral at bedtime  glucagon  Injectable 1 milliGRAM(s) IntraMuscular once  HYDROmorphone  Injectable 0.5 milliGRAM(s) IV Push once  insulin glargine Injectable (LANTUS) 26 Unit(s) SubCutaneous at bedtime  insulin lispro (ADMELOG) corrective regimen sliding scale   SubCutaneous three times a day before meals  iron sucrose IVPB 200 milliGRAM(s) IV Intermittent every 24 hours  midodrine. 5 milliGRAM(s) Oral every 8 hours  ranolazine 500 milliGRAM(s) Oral two times a day  sodium bicarbonate 1300 milliGRAM(s) Oral three times a day  sodium chloride 0.45% 1000 milliLiter(s) (60 mL/Hr) IV Continuous <Continuous>  sodium chloride 2% . 1000 milliLiter(s) (40 mL/Hr) IV Continuous <Continuous>  sodium chloride 2% . 1000 milliLiter(s) (30 mL/Hr) IV Continuous <Continuous>  ticagrelor 90 milliGRAM(s) Oral two times a day  urea Oral Powder 15 Gram(s) Oral daily    MEDICATIONS  (PRN):  acetaminophen     Tablet .. 650 milliGRAM(s) Oral every 6 hours PRN Temp greater or equal to 38C (100.4F), Mild Pain (1 - 3)  aluminum hydroxide/magnesium hydroxide/simethicone Suspension 30 milliLiter(s) Oral every 4 hours PRN Dyspepsia  dextrose Oral Gel 15 Gram(s) Oral once PRN Blood Glucose LESS THAN 70 milliGRAM(s)/deciliter  melatonin 3 milliGRAM(s) Oral at bedtime PRN Insomnia  ondansetron Injectable 4 milliGRAM(s) IV Push every 8 hours PRN Nausea and/or Vomiting  oxyCODONE    IR 5 milliGRAM(s) Oral every 6 hours PRN moderate to severe pain      Allergies    No Known Allergies        Vital Signs Last 24 Hrs  T(C): 37.7 (26 Mar 2025 09:51), Max: 38.3 (25 Mar 2025 20:00)  T(F): 99.9 (26 Mar 2025 09:51), Max: 100.9 (25 Mar 2025 20:00)  HR: 63 (26 Mar 2025 09:51) (63 - 78)  BP: 127/63 (26 Mar 2025 09:51) (96/51 - 141/50)  BP(mean): 68 (26 Mar 2025 08:00) (55 - 84)  RR: 18 (26 Mar 2025 09:51) (18 - 20)  SpO2: 97% (26 Mar 2025 09:51) (95% - 100%)    Parameters below as of 26 Mar 2025 09:51  Patient On (Oxygen Delivery Method): room air        PHYSICAL EXAM:  GENERAL: Comfortable  HEENT: No periorbital edema  NECK: Supple, No JVD  NERVOUS SYSTEM: Awake, alert  CHEST: Clear, diminished BS  HEART:  RRR, No rub  ABDOMEN: Soft,  BS+  EXTREMITIES: Tr LE edema  SKIN: No rash  : [place]    LABS:                        8.5    13.44 )-----------( 344      ( 25 Mar 2025 08:09 )             25.5     03-26    125[L]  |  94[L]  |  89.9[H]  ----------------------------<  167[H]  5.3   |  15.0[L]  |  3.46[H]    Ca    7.3[L]      26 Mar 2025 06:09  Phos  3.8     03-25  Mg     2.2     03-25    TPro  6.2[L]  /  Alb  1.7[L]  /  TBili  0.7  /  DBili  x   /  AST  59[H]  /  ALT  25  /  AlkPhos  276[H]  03-26      Urinalysis Basic - ( 26 Mar 2025 06:09 )    Color: x / Appearance: x / SG: x / pH: x  Gluc: 167 mg/dL / Ketone: x  / Bili: x / Urobili: x   Blood: x / Protein: x / Nitrite: x   Leuk Esterase: x / RBC: x / WBC x   Sq Epi: x / Non Sq Epi: x / Bacteria: x          RADIOLOGY & ADDITIONAL TESTS:  < from: CT Abdomen and Pelvis No Cont (03.22.25 @ 13:45) >    ACC: [id number] EXAM:  CT ABDOMEN AND PELVIS   ORDERED BY: GARTH IBARRA     PROCEDURE DATE:  03/22/2025          INTERPRETATION:  CLINICAL INFORMATION: Abdominal pain status post cardiac   catheterization 2 days prior. Evaluate for retroperitoneal hemorrhage    COMPARISON: CT abdomen pelvis 10/8/2024    CONTRAST/COMPLICATIONS:  IV Contrast: NONE  Oral Contrast: NONE  .    PROCEDURE:  CT of the Abdomen and Pelvis was performed.  Sagittal and coronal reformats were performed.    FINDINGS:  LOWER CHEST: Trace left pleural effusion. Calcified right pleural plaque.   Cardiomegaly.    LIVER: Within normal limits.  BILE DUCTS: Normal caliber.  GALLBLADDER: Distended with dependent sludge.  SPLEEN: Within normal limits.  PANCREAS: Within normal limits.  ADRENALS: Within normal limits.  KIDNEYS/URETERS: No hydronephrosis or urinary tract calculus. Enlarged,   edematous left kidney with increased perinephric fat stranding.    BLADDER: Nondependent locules of air (series 3, image 127) without   perivesicular fat stranding, nonspecific, could be secondary to recent   instrumentation versus cystitis. Otherwise, unremarkable.  REPRODUCTIVE ORGANS: Uterus and adnexa within normal limits.    BOWEL: Small hiatal hernia. No bowel obstruction. Appendix is not   visualized. No evidence of inflammation in the pericecal region.  PERITONEUM/RETROPERITONEUM: Within normal limits. No evidence of   retroperitoneal hemorrhage.  VESSELS: Severe aortoiliac atherosclerosis.  LYMPH NODES: No lymphadenopathy.  ABDOMINAL WALL: Small fat-containing umbilical hernia..  BONES: Within normal limits.    IMPRESSION:  *  No evidence of retroperitoneal hemorrhage or ileus.  *  Nondependent locules of air in the bladder and left ureter without   perivesicular fat stranding, nonspecific, could be secondary to recent   instrumentation versus cystitis. Recommend clinical correlation.  *  Enlarged, edematous left kidney with increased perinephric fat   stranding and air in the left renal collecting system can be seen in the   setting of pyelonephritis/ureteritis. Evaluation is limited due to   noncontrast technique. Correlation with laboratory values suggested.    < end of copied text >

## 2025-03-26 NOTE — PROGRESS NOTE ADULT - ASSESSMENT
73 y/o female with PMH of HTN, HLD, CAD s/p PCI, HFrEF, Mobitz II s/p PPM (MD NereydaT), PAD with chronic right LE wound, CVA, DM2, CKD3 came to the ED complaining of chest pressure. Found to have acute on chronic CHF and NSTEMI in ED.     Hyponatremia with BRADY ATN on CKD  today sodium 126 improving  and HCO3-17- creat-1.35- -diuretics on hold  ATN post cardiac cath on 03/22 PCI to RCA with ZEINAB and hypotensive event  Hypervolemic hyponatremia,  - avoid further potential nephrotoxins  - cont NaHCO3  - cont PO fluid restriction and PO Urea - renal toconsider Tolvaptan  - cont Midodrine    #NSTEMI --s/p heparin ,Aspirin loaded in the ED  -S/p LHC on 03/22/2024 with PCI to RCA with ZEINAB  -Aspirin 81 mg +-Brilinta  -Continue Statin, Ranolazine 500mg bid   -cont Toprol 100mg   -hold Farxiga and Entresto for now- with BRADY  -cont Lipitor to 80qhs   -f/u dr. Bajwa in 1-2 weeks     #Acute on chronic CHFrEF -#HTN/HLD/CAD   - Received IV lasix. now on hold as per cardiomem data- interpretation per-Heart failure team   -daily weight   -Farxiga 10mg on hold with infection  -Entresto on hold due to BRADY     #Pyelonephritis-with complicating # ESBL Bacteremia  -Ertapenem--ID following   -CT + right pyelonephritis  -Repeat blood culture on 03/24- ngtd    #DM-2   Lantus 26 units HS  Insulin sliding scale     #Anemia   Likely of chronic disease   Hb stable   Iron tab     Dispo- PT eval, medically active- hyponatremia and MA -improving - on active IVF with bicarb- DW Renal- no need fr HD

## 2025-03-26 NOTE — PROGRESS NOTE ADULT - SUBJECTIVE AND OBJECTIVE BOX
[facility]  INFECTIOUS DISEASES at [place] / [place] / [facility]  =======================================================                              Leo Lam MD                              Professor Emeritus:  Dr Angelo Sebastian MD            Diplomates American Board of Internal Medicine & Infectious Diseases                                   Tel  [phone] Fax [phone]                                  Hospital Consult line:  [phone]  =======================================================    Follow Up: Bacteremia     Interval History/ROS: Seen and examined at bedside.     REVIEW OF SYSTEMS  Unchanged from prior     Allergies  No Known Allergies    ANTIMICROBIALS:    ertapenem  IVPB 500 every 24 hours    OTHER MEDS: MEDICATIONS  (STANDING):  acetaminophen     Tablet .. 650 every 6 hours PRN  albuterol/ipratropium for Nebulization 3 every 6 hours  aluminum hydroxide/magnesium hydroxide/simethicone Suspension 30 every 4 hours PRN  aspirin enteric coated 81 daily  atorvastatin 80 at bedtime  dextrose 50% Injectable 25 once  dextrose 50% Injectable 12.5 once  dextrose 50% Injectable 25 once  dextrose Oral Gel 15 once PRN  epoetin kristina-epbx (RETACRIT) Injectable 20000 <User Schedule>  gabapentin 400 at bedtime  glucagon  Injectable 1 once  HYDROmorphone  Injectable 0.5 once  insulin glargine Injectable (LANTUS) 26 at bedtime  insulin lispro (ADMELOG) corrective regimen sliding scale  three times a day before meals  melatonin 3 at bedtime PRN  midodrine. 5 every 8 hours  ondansetron Injectable 4 every 8 hours PRN  oxyCODONE    IR 5 every 6 hours PRN  ranolazine 500 two times a day  ticagrelor 90 two times a day  urea Oral Powder 15 daily    Vital Signs Last 24 Hrs  T(F): 99.9 (03-26-25 @ 12:05), Max: 101.8 (03-24-25 @ 17:57)    Vital Signs Last 24 Hrs  HR: 76 (03-26-25 @ 12:05) (63 - 78)  BP: 105/51 (03-26-25 @ 12:05) (96/51 - 141/50)  RR: 18 (03-26-25 @ 12:05)  SpO2: 98% (03-26-25 @ 12:05) (95% - 100%)  Wt(kg): --    EXAM:  General: In NAD   HEENT: NCAT  CV: S1+S2  Lungs: No acute respiratory distress  Abd: Soft  : [place]   Ext: No cyanosis  Neuro: Calm   Skin: No rash     Labs:                        9.2    11.92 )-----------( 394      ( 26 Mar 2025 11:34 )             27.0     03-26    125[L]  |  94[L]  |  89.9[H]  ----------------------------<  167[H]  5.3   |  15.0[L]  |  3.46[H]    Ca    7.3[L]      26 Mar 2025 06:09  Phos  3.8     03-25  Mg     2.2     03-25    TPro  6.2[L]  /  Alb  1.7[L]  /  TBili  0.7  /  DBili  x   /  AST  59[H]  /  ALT  25  /  AlkPhos  276[H]  03-26    WBC Trend:  WBC Count: 11.92 (03-26-25 @ 11:34)  WBC Count: 13.44 (03-25-25 @ 08:09)  WBC Count: 15.80 (03-24-25 @ 05:20)  WBC Count: 18.49 (03-23-25 @ 06:15)    Creatine Trend:  Creatinine: 3.46 (03-26)  Creatinine: 3.54 (03-25)  Creatinine: 3.58 (03-25)  Creatinine: 3.50 (03-24)    Liver Biochemical Testing Trend:  Alanine Aminotransferase (ALT/SGPT): 25 (03-26)  Alanine Aminotransferase (ALT/SGPT): 20 (03-25)  Alanine Aminotransferase (ALT/SGPT): 11 (03-23)  Alanine Aminotransferase (ALT/SGPT): 19 (03-19)  Alanine Aminotransferase (ALT/SGPT): 24 (01-07)  Aspartate Aminotransferase (AST/SGOT): 59 (03-26-25 @ 06:09)  Aspartate Aminotransferase (AST/SGOT): 38 (03-25-25 @ 01:20)  Aspartate Aminotransferase (AST/SGOT): 20 (03-23-25 @ 06:15)  Aspartate Aminotransferase (AST/SGOT): 27 (03-19-25 @ 22:08)  Aspartate Aminotransferase (AST/SGOT): 24 (01-07-25 @ 17:16)  Bilirubin Total: 0.7 (03-26)  Bilirubin Total: 0.7 (03-25)  Bilirubin Total: 0.7 (03-23)  Bilirubin Total: 0.9 (03-19)  Bilirubin Total: 0.3 (01-07)    Urinalysis Basic - ( 26 Mar 2025 06:09 )    Color: x / Appearance: x / SG: x / pH: x  Gluc: 167 mg/dL / Ketone: x  / Bili: x / Urobili: x   Blood: x / Protein: x / Nitrite: x   Leuk Esterase: x / RBC: x / WBC x   Sq Epi: x / Non Sq Epi: x / Bacteria: x    MICROBIOLOGY:    MRSA PCR Result.: NotDetec (03-20-25 @ 13:30)  MRSA PCR Result.: NotDetec (01-13-25 @ 04:00)  MRSA PCR Result.: NotDetec (10-14-24 @ 04:00)  MRSA PCR Result.: NotDetec (07-12-24 @ 17:45)  MRSA PCR Result.: NotDetec (05-10-24 @ 10:10)    Culture - Blood (collected 24 Mar 2025 05:28)  Source: Blood Blood-Venous  Preliminary Report:    No growth at 24 hours    Culture - Blood (collected 24 Mar 2025 05:20)  Source: Blood Blood-Venous  Preliminary Report:    No growth at 24 hours    Culture - Urine (collected 22 Mar 2025 15:30)  Source: Catheterized Catheterized  Final Report:    >100,000 CFU/ml Escherichia coli ESBL  Organism: Escherichia coli ESBL  Organism: Escherichia coli ESBL    Sensitivities:      -  Levofloxacin: R >4      -  Tobramycin: S <=2      -  Nitrofurantoin: S <=32 Should not be used to treat pyelonephritis      -  Aztreonam: R >16      -  Gentamicin: S <=2      -  Cefepime: R >16      -  Cefazolin: R >16 For uncomplicated UTI with K. pneumoniae, E. coli, or P. mirablis: RENNY <=16 is sensitive and RENNY >=32 is resistant. This also predicts results for oral agents cefaclor, cefdinir, cefpodoxime, cefprozil, cefuroxime axetil, cephalexin and locarbef for uncomplicated UTI. Note that some isolates may be susceptible to these agents while testing resistant to cefazolin.      -  Piperacillin/Tazobactam: S <=8      -  Ciprofloxacin: R >2      -  Imipenem: S <=1      -  Ceftriaxone: R >32      -  Ampicillin: R >16 These ampicillin results predict results for amoxicillin      Method Type: RENNY      -  Meropenem: S <=1      -  Ampicillin/Sulbactam: R >16/8      -  Cefuroxime: R >16      -  Trimethoprim/Sulfamethoxazole: R >2/38      -  Ertapenem: S <=0.5    Culture - Blood (collected 22 Mar 2025 15:20)  Source: Blood Blood-Peripheral  Final Report:    Growth in aerobic and anaerobic bottles: Escherichia coli ESBL    Direct identification is available within approximately 3-5    hours either by Blood Panel Multiplexed PCR or Direct    MALDI-TOF. Details: [email or web address]  Organism: Blood Culture PCR  Escherichia coli ESBL  Organism: Escherichia coli ESBL    Sensitivities:      -  Levofloxacin: R >4      -  Tobramycin: S <=2      -  Aztreonam: R >16      -  Gentamicin: S <=2      -  Cefazolin: R >16      -  Cefepime: R >16      -  Piperacillin/Tazobactam: R <=8      -  Ciprofloxacin: R >2      -  Imipenem: S <=1      -  Ceftriaxone: R >32      -  Ampicillin: R >16 These ampicillin results predict results for amoxicillin      Method Type: RENNY      -  Meropenem: S <=1      -  Ampicillin/Sulbactam: R >16/8      -  Trimethoprim/Sulfamethoxazole: R >2/38      -  Ertapenem: S <=0.5  Organism: Blood Culture PCR    Sensitivities:      -  CTX-M Resistance Marker: Detec      -  Escherichia coli: Detec      Method Type: PCR      -  ESBL: Detec    Culture - Blood (collected 22 Mar 2025 15:12)  Source: Blood Blood-Peripheral  Final Report:    Growth in aerobic and anaerobic bottles: Escherichia coli    See previous culture [id number]    Culture - Urine (collected 11 Dec 2024 10:52)  Source: Catheterized Catheterized  Final Report:    >100,000 CFU/ml Escherichia coli ESBL  Organism: Escherichia coli ESBL  Organism: Escherichia coli ESBL    Sensitivities:      -  Levofloxacin: R >4      -  Tobramycin: S <=2      -  Nitrofurantoin: S <=32 Should not be used to treat pyelonephritis      -  Aztreonam: R >16      -  Gentamicin: S <=2      -  Cefepime: R >16      -  Cefazolin: R >16 For uncomplicated UTI with K. pneumoniae, E. coli, or P. mirablis: RENNY <=16 is sensitive and RENNY >=32 is resistant. This also predicts results for oral agents cefaclor, cefdinir, cefpodoxime, cefprozil, cefuroxime axetil, cephalexin and locarbef for uncomplicated UTI. Note that some isolates may be susceptible to these agents while testing resistant to cefazolin.      -  Piperacillin/Tazobactam: R >64      -  Ciprofloxacin: R >2      -  Imipenem: S <=1      -  Ceftriaxone: R >32      -  Ampicillin: R >16 These ampicillin results predict results for amoxicillin      Method Type: RENNY      -  Meropenem: S <=1      -  Ampicillin/Sulbactam: R >16/8      -  Cefuroxime: R >16      -  Trimethoprim/Sulfamethoxazole: R >2/38      -  Ertapenem: S <=0.5    Culture - Urine (collected 08 Oct 2024 14:19)  Source: Clean Catch Clean Catch (Midstream)  Final Report:    >100,000 CFU/ml Escherichia coli ESBL  Organism: Escherichia coli ESBL  Organism: Escherichia coli ESBL    Sensitivities:      -  Levofloxacin: R >4      -  Tobramycin: S <=2      -  Nitrofurantoin: S <=32 Should not be used to treat pyelonephritis      -  Aztreonam: R >16      -  Gentamicin: S <=2      -  Cefepime: R >16      -  Cefazolin: R >16 For uncomplicated UTI with K. pneumoniae, E. coli, or P. mirablis: RENNY <=16 is sensitive and RENNY >=32 is resistant. This also predicts results for oral agents cefaclor, cefdinir, cefpodoxime, cefprozil, cefuroxime axetil, cephalexin and locarbef for uncomplicated UTI. Note that some isolates may be susceptible to these agents while testing resistant to cefazolin.      -  Piperacillin/Tazobactam: S <=8      -  Ciprofloxacin: R >2      -  Imipenem: S <=1      -  Ceftriaxone: R >32      -  Ampicillin: R >16 These ampicillin results predict results for amoxicillin      Method Type: RENNY      -  Meropenem: S <=1      -  Ampicillin/Sulbactam: R >16/8      -  Cefuroxime: R >16      -  Trimethoprim/Sulfamethoxazole: R >2/38      -  Ertapenem: S <=0.5    Culture - Blood (collected 10 May 2024 03:37)  Source: .Blood Blood  Final Report:    No growth at 5 days    Culture - Blood (collected 10 May 2024 03:33)  Source: .Blood Blood  Final Report:    No growth at 5 days    Culture - Blood (collected 02 Mar 2024 18:15)  Source: .Blood Blood-Peripheral  Final Report:    No growth at 5 days    Rapid RVP Result: NotDetec (03-22 @ 14:27)    Ferritin: 350 (03-25)    Troponin T, High Sensitivity Result: 854 (03-20)  Troponin T, High Sensitivity Result: 906 (03-19)    Blood Gas Venous - Lactate: 1.20 (03-25 @ 01:20)  Lactate, Blood: 1.7 (03-24 @ 14:51)    RADIOLOGY:  imaging below personally reviewed    < from: CT Abdomen and Pelvis No Cont (03.22.25 @ 13:45) >  IMPRESSION:  *  No evidence of retroperitoneal hemorrhage or ileus.  *  Nondependent locules of air in the bladder and left ureter without   perivesicular fat stranding, nonspecific, could be secondary to recent   instrumentation versus cystitis. Recommend clinical correlation.  *  Enlarged, edematous left kidney with increased perinephric fat   stranding and air in the left renal collecting system can be seen in the   setting of pyelonephritis/ureteritis. Evaluation is limited due to   noncontrast technique. Correlation with laboratory values suggested.    < end of copied text >

## 2025-03-26 NOTE — PROGRESS NOTE ADULT - ASSESSMENT
CKD(III): baseline Screat 1.3- 1.8  BRADY, ATN post cardiac cath on 03/22 PCI to RCA with ZEINAB and hypotensive event  Hypervolemic hyponatremia  MA  CT abd noted:  No hydronephrosis - Enlarged edematous L kidney w increased perinephric fat stranding- Likely Pyelonephritis   ESBL Bacteremia  - avoid further potential nephrotoxins  - cont IV antibiotics  - cont NaHCO3  - cont PO fluid restriction and PO Urea >>> consider Tolvaptan  - cont Midodrine  - trend labs    Anemia: +CKD + low Fe  - cont IV Fe  - add RONDA    RO: secondary HPT  - cont Rocaltrol  - add CaCO3

## 2025-03-27 DIAGNOSIS — D63.8 ANEMIA IN OTHER CHRONIC DISEASES CLASSIFIED ELSEWHERE: ICD-10-CM

## 2025-03-27 LAB
ALBUMIN SERPL ELPH-MCNC: 2.3 G/DL — LOW (ref 3.3–5.2)
ALP SERPL-CCNC: 244 U/L — HIGH (ref 40–120)
ALT FLD-CCNC: 23 U/L — SIGNIFICANT CHANGE UP
ANION GAP SERPL CALC-SCNC: 14 MMOL/L — SIGNIFICANT CHANGE UP (ref 5–17)
AST SERPL-CCNC: 36 U/L — HIGH
BILIRUB SERPL-MCNC: 0.7 MG/DL — SIGNIFICANT CHANGE UP (ref 0.4–2)
BLD GP AB SCN SERPL QL: SIGNIFICANT CHANGE UP
BUN SERPL-MCNC: 84.6 MG/DL — HIGH (ref 8–20)
CALCIUM SERPL-MCNC: 7 MG/DL — LOW (ref 8.4–10.5)
CHLORIDE SERPL-SCNC: 94 MMOL/L — LOW (ref 96–108)
CO2 SERPL-SCNC: 21 MMOL/L — LOW (ref 22–29)
CREAT SERPL-MCNC: 3.08 MG/DL — HIGH (ref 0.5–1.3)
EGFR: 16 ML/MIN/1.73M2 — LOW
EGFR: 16 ML/MIN/1.73M2 — LOW
GLUCOSE BLDC GLUCOMTR-MCNC: 113 MG/DL — HIGH (ref 70–99)
GLUCOSE BLDC GLUCOMTR-MCNC: 140 MG/DL — HIGH (ref 70–99)
GLUCOSE BLDC GLUCOMTR-MCNC: 199 MG/DL — HIGH (ref 70–99)
GLUCOSE BLDC GLUCOMTR-MCNC: 245 MG/DL — HIGH (ref 70–99)
GLUCOSE SERPL-MCNC: 140 MG/DL — HIGH (ref 70–99)
HCT VFR BLD CALC: 23.6 % — LOW (ref 34.5–45)
HGB BLD-MCNC: 8 G/DL — LOW (ref 11.5–15.5)
MCHC RBC-ENTMCNC: 28.4 PG — SIGNIFICANT CHANGE UP (ref 27–34)
MCHC RBC-ENTMCNC: 33.9 G/DL — SIGNIFICANT CHANGE UP (ref 32–36)
MCV RBC AUTO: 83.7 FL — SIGNIFICANT CHANGE UP (ref 80–100)
NRBC # BLD AUTO: 0 K/UL — SIGNIFICANT CHANGE UP (ref 0–0)
NRBC # FLD: 0 K/UL — SIGNIFICANT CHANGE UP (ref 0–0)
NRBC BLD AUTO-RTO: 0 /100 WBCS — SIGNIFICANT CHANGE UP (ref 0–0)
OB PNL STL: POSITIVE
PLATELET # BLD AUTO: 385 K/UL — SIGNIFICANT CHANGE UP (ref 150–400)
PMV BLD: 9.6 FL — SIGNIFICANT CHANGE UP (ref 7–13)
POTASSIUM SERPL-MCNC: 4.7 MMOL/L — SIGNIFICANT CHANGE UP (ref 3.5–5.3)
POTASSIUM SERPL-SCNC: 4.7 MMOL/L — SIGNIFICANT CHANGE UP (ref 3.5–5.3)
PROT SERPL-MCNC: 5.6 G/DL — LOW (ref 6.6–8.7)
RBC # BLD: 2.82 M/UL — LOW (ref 3.8–5.2)
RBC # FLD: 15.9 % — HIGH (ref 10.3–14.5)
SODIUM SERPL-SCNC: 129 MMOL/L — LOW (ref 135–145)
WBC # BLD: 9.87 K/UL — SIGNIFICANT CHANGE UP (ref 3.8–10.5)
WBC # FLD AUTO: 9.87 K/UL — SIGNIFICANT CHANGE UP (ref 3.8–10.5)

## 2025-03-27 PROCEDURE — 99232 SBSQ HOSP IP/OBS MODERATE 35: CPT

## 2025-03-27 PROCEDURE — G0545: CPT

## 2025-03-27 PROCEDURE — 99233 SBSQ HOSP IP/OBS HIGH 50: CPT

## 2025-03-27 PROCEDURE — 36000 PLACE NEEDLE IN VEIN: CPT

## 2025-03-27 RX ORDER — ACETAMINOPHEN 500 MG/5ML
325 LIQUID (ML) ORAL ONCE
Refills: 0 | Status: COMPLETED | OUTPATIENT
Start: 2025-03-27 | End: 2025-03-27

## 2025-03-27 RX ORDER — METOPROLOL SUCCINATE 50 MG/1
25 TABLET, EXTENDED RELEASE ORAL AT BEDTIME
Refills: 0 | Status: DISCONTINUED | OUTPATIENT
Start: 2025-03-27 | End: 2025-03-28

## 2025-03-27 RX ORDER — FUROSEMIDE 10 MG/ML
80 INJECTION INTRAMUSCULAR; INTRAVENOUS
Refills: 0 | Status: DISCONTINUED | OUTPATIENT
Start: 2025-03-27 | End: 2025-03-28

## 2025-03-27 RX ADMIN — Medication 81 MILLIGRAM(S): at 12:48

## 2025-03-27 RX ADMIN — FUROSEMIDE 80 MILLIGRAM(S): 10 INJECTION INTRAMUSCULAR; INTRAVENOUS at 18:16

## 2025-03-27 RX ADMIN — Medication 650 MILLIGRAM(S): at 22:32

## 2025-03-27 RX ADMIN — CALCIUM CARBONATE 1 TABLET(S): 750 TABLET ORAL at 05:22

## 2025-03-27 RX ADMIN — Medication 1300 MILLIGRAM(S): at 15:21

## 2025-03-27 RX ADMIN — Medication 650 MILLIGRAM(S): at 21:31

## 2025-03-27 RX ADMIN — CALCIUM CARBONATE 1 TABLET(S): 750 TABLET ORAL at 17:18

## 2025-03-27 RX ADMIN — METOPROLOL SUCCINATE 25 MILLIGRAM(S): 50 TABLET, EXTENDED RELEASE ORAL at 21:22

## 2025-03-27 RX ADMIN — CALCITRIOL 0.25 MICROGRAM(S): 0.5 CAPSULE, GELATIN COATED ORAL at 12:45

## 2025-03-27 RX ADMIN — Medication 1300 MILLIGRAM(S): at 21:22

## 2025-03-27 RX ADMIN — Medication 650 MILLIGRAM(S): at 01:30

## 2025-03-27 RX ADMIN — INSULIN LISPRO 2: 100 INJECTION, SOLUTION INTRAVENOUS; SUBCUTANEOUS at 17:18

## 2025-03-27 RX ADMIN — Medication 3 MILLIGRAM(S): at 21:22

## 2025-03-27 RX ADMIN — Medication 325 MILLIGRAM(S): at 12:47

## 2025-03-27 RX ADMIN — TICAGRELOR 90 MILLIGRAM(S): 90 TABLET ORAL at 17:20

## 2025-03-27 RX ADMIN — TICAGRELOR 90 MILLIGRAM(S): 90 TABLET ORAL at 05:23

## 2025-03-27 RX ADMIN — IPRATROPIUM BROMIDE AND ALBUTEROL SULFATE 3 MILLILITER(S): .5; 2.5 SOLUTION RESPIRATORY (INHALATION) at 04:51

## 2025-03-27 RX ADMIN — Medication 325 MILLIGRAM(S): at 05:33

## 2025-03-27 RX ADMIN — INSULIN GLARGINE-YFGN 26 UNIT(S): 100 INJECTION, SOLUTION SUBCUTANEOUS at 21:20

## 2025-03-27 RX ADMIN — ATORVASTATIN CALCIUM 80 MILLIGRAM(S): 80 TABLET, FILM COATED ORAL at 21:22

## 2025-03-27 RX ADMIN — RANOLAZINE 500 MILLIGRAM(S): 1000 TABLET, FILM COATED, EXTENDED RELEASE ORAL at 17:18

## 2025-03-27 RX ADMIN — ERTAPENEM SODIUM 100 MILLIGRAM(S): 1 INJECTION, POWDER, LYOPHILIZED, FOR SOLUTION INTRAMUSCULAR; INTRAVENOUS at 12:40

## 2025-03-27 RX ADMIN — Medication 650 MILLIGRAM(S): at 05:33

## 2025-03-27 RX ADMIN — Medication 325 MILLIGRAM(S): at 02:57

## 2025-03-27 RX ADMIN — IRON SUCROSE 110 MILLIGRAM(S): 20 INJECTION, SOLUTION INTRAVENOUS at 17:42

## 2025-03-27 RX ADMIN — Medication 1 APPLICATION(S): at 12:40

## 2025-03-27 RX ADMIN — Medication 15 GRAM(S): at 12:46

## 2025-03-27 RX ADMIN — RANOLAZINE 500 MILLIGRAM(S): 1000 TABLET, FILM COATED, EXTENDED RELEASE ORAL at 05:22

## 2025-03-27 RX ADMIN — GABAPENTIN 400 MILLIGRAM(S): 400 CAPSULE ORAL at 21:22

## 2025-03-27 RX ADMIN — IPRATROPIUM BROMIDE AND ALBUTEROL SULFATE 3 MILLILITER(S): .5; 2.5 SOLUTION RESPIRATORY (INHALATION) at 21:14

## 2025-03-27 RX ADMIN — IPRATROPIUM BROMIDE AND ALBUTEROL SULFATE 3 MILLILITER(S): .5; 2.5 SOLUTION RESPIRATORY (INHALATION) at 14:45

## 2025-03-27 RX ADMIN — Medication 1300 MILLIGRAM(S): at 05:22

## 2025-03-27 NOTE — PROGRESS NOTE ADULT - SUBJECTIVE AND OBJECTIVE BOX
NEPHROLOGY INTERVAL HPI/OVERNIGHT EVENTS:  pt clinically stable  no acute distress noted  schroeder with clear urine    MEDICATIONS  (STANDING):  albuterol/ipratropium for Nebulization 3 milliLiter(s) Nebulizer every 6 hours  aspirin enteric coated 81 milliGRAM(s) Oral daily  atorvastatin 80 milliGRAM(s) Oral at bedtime  calcitriol   Capsule 0.25 MICROGram(s) Oral daily  calcium carbonate   1250 mG (OsCal) 1 Tablet(s) Oral three times a day  chlorhexidine 2% Cloths 1 Application(s) Topical daily  dextrose 5%. 1000 milliLiter(s) (100 mL/Hr) IV Continuous <Continuous>  dextrose 5%. 1000 milliLiter(s) (50 mL/Hr) IV Continuous <Continuous>  dextrose 50% Injectable 25 Gram(s) IV Push once  dextrose 50% Injectable 12.5 Gram(s) IV Push once  dextrose 50% Injectable 25 Gram(s) IV Push once  epoetin kristina-epbx (RETACRIT) Injectable 10924 Unit(s) SubCutaneous <User Schedule>  ertapenem  IVPB 500 milliGRAM(s) IV Intermittent every 24 hours  ferrous    sulfate 325 milliGRAM(s) Oral daily  gabapentin 400 milliGRAM(s) Oral at bedtime  glucagon  Injectable 1 milliGRAM(s) IntraMuscular once  HYDROmorphone  Injectable 0.5 milliGRAM(s) IV Push once  insulin glargine Injectable (LANTUS) 26 Unit(s) SubCutaneous at bedtime  insulin lispro (ADMELOG) corrective regimen sliding scale   SubCutaneous three times a day before meals  iron sucrose IVPB 200 milliGRAM(s) IV Intermittent every 24 hours  lactated ringers. 1000 milliLiter(s) (120 mL/Hr) IV Continuous <Continuous>  ranolazine 500 milliGRAM(s) Oral two times a day  sodium bicarbonate 1300 milliGRAM(s) Oral three times a day  sodium chloride 0.45% 1000 milliLiter(s) (60 mL/Hr) IV Continuous <Continuous>  sodium chloride 2% . 1000 milliLiter(s) (40 mL/Hr) IV Continuous <Continuous>  sodium chloride 2% . 1000 milliLiter(s) (30 mL/Hr) IV Continuous <Continuous>  ticagrelor 90 milliGRAM(s) Oral two times a day  urea Oral Powder 15 Gram(s) Oral daily    MEDICATIONS  (PRN):  acetaminophen     Tablet .. 650 milliGRAM(s) Oral every 6 hours PRN Temp greater or equal to 38C (100.4F), Mild Pain (1 - 3)  aluminum hydroxide/magnesium hydroxide/simethicone Suspension 30 milliLiter(s) Oral every 4 hours PRN Dyspepsia  dextrose Oral Gel 15 Gram(s) Oral once PRN Blood Glucose LESS THAN 70 milliGRAM(s)/deciliter  melatonin 3 milliGRAM(s) Oral at bedtime PRN Insomnia  ondansetron Injectable 4 milliGRAM(s) IV Push every 8 hours PRN Nausea and/or Vomiting  oxyCODONE    IR 5 milliGRAM(s) Oral every 6 hours PRN moderate to severe pain      Allergies    No Known Allergies        Vital Signs Last 24 Hrs  T(C): 36.7 (27 Mar 2025 08:00), Max: 38.4 (27 Mar 2025 02:17)  T(F): 98.1 (27 Mar 2025 08:00), Max: 101.1 (27 Mar 2025 02:17)  HR: 68 (27 Mar 2025 10:00) (60 - 77)  BP: 106/52 (27 Mar 2025 10:00) (95/51 - 140/63)  BP(mean): 70 (27 Mar 2025 10:00) (58 - 70)  RR: 18 (27 Mar 2025 06:16) (18 - 18)  SpO2: 97% (27 Mar 2025 08:00) (95% - 100%)    Parameters below as of 27 Mar 2025 10:00  Patient On (Oxygen Delivery Method): room air        PHYSICAL EXAM:  GENERAL: Debilitated  HEENT: No periorbital edema  NECK: Supple, No JVD  NERVOUS SYSTEM: Awake, alert  CHEST: Clear, diminished BS  HEART:  RRR, No rub  ABDOMEN: Soft,  BS+  EXTREMITIES: Tr LE edema  SKIN: No rash  : Schroeder      LABS:                        8.0    9.87  )-----------( 385      ( 27 Mar 2025 06:35 )             23.6     03-27    129[L]  |  94[L]  |  84.6[H]  ----------------------------<  140[H]  4.7   |  21.0[L]  |  3.08[H]    Ca    7.0[L]      27 Mar 2025 06:35    TPro  5.6[L]  /  Alb  2.3[L]  /  TBili  0.7  /  DBili  x   /  AST  36[H]  /  ALT  23  /  AlkPhos  244[H]  03-27      Urinalysis Basic - ( 27 Mar 2025 06:35 )    Color: x / Appearance: x / SG: x / pH: x  Gluc: 140 mg/dL / Ketone: x  / Bili: x / Urobili: x   Blood: x / Protein: x / Nitrite: x   Leuk Esterase: x / RBC: x / WBC x   Sq Epi: x / Non Sq Epi: x / Bacteria: x          RADIOLOGY & ADDITIONAL TESTS:

## 2025-03-27 NOTE — PROGRESS NOTE ADULT - NS ATTEND AMEND GEN_ALL_CORE FT
Ms. Ko is a 75 y/o Divehi speaking female with PMH of ICM/HFrEF (LVEF 35-40%), CAD s/p PCI, Mobitz II s/p PPM (MIRNA Dawn), HTN, HLD, PAD with chronic right LE wound, DM2, and CKD3, who presented to the ED on 3/19 with c/o CP, found to have NSTEMI. She underwent PCI to her RCA for ISR on Friday 3/21/25. Over the next couple of days she developed fever, leukocytosis and worsening renal function. Her blood cultures are positive for ESBL E.coli and CT abdomen is concerning for left pyelonephritis.     Continue holding her home dose of Toprol, Entresto, aldactone in the setting of active sepsis, hypotension and renal dysfunction. Midodrine weaned off. We will reinitiate GDMT if blood pressure remains stable off midodrine and once renal function improves. Stop IV fluids. We will interrogate her cardiomems tomorrow. Ms. Ko is a 75 y/o Arabic speaking female with PMH of ICM/HFrEF (LVEF 35-40%), CAD s/p PCI, Mobitz II s/p PPM (MIRNA Dawn), HTN, HLD, PAD with chronic right LE wound, DM2, and CKD3, who presented to the ED on 3/19 with c/o CP, found to have NSTEMI. She underwent PCI to her RCA for ISR on Friday 3/21/25. Over the next couple of days she developed fever, leukocytosis and worsening renal function. Her blood cultures are positive for ESBL E.coli and CT abdomen is concerning for left pyelonephritis.     We have been holding her home dose of Toprol, Entresto, aldactone in the setting of active sepsis, hypotension and renal dysfunction.   Midodrine weaned off yesterday.  We will start low dose Toprol 25 mg daily today.  Stop IV fluids.   We will dose her diuretics today after we interrogate her cardiomems.   She is stable on room air even though she appears volume overloaded on exam.   Continue DAPT and Ranexa for recent PCI.   Management of ESBL E.coli sepsis per primary team.

## 2025-03-27 NOTE — PROGRESS NOTE ADULT - ASSESSMENT
74 F PMH HTN, hyperlipidemia, CAD, CHF s/p CardioMEMS, Mobitz II s/p pacemaker, PAD, CVA, DM2 (A1c 8.5), CKD3, who presented here with chest pressure.     Acute on chronic CHF exacerbation  NSTEMI s/p cardiac cath with ZEINAB to RCA on March 22nd  Course complicated by  Fevers  Leukocytosis  L sided flank pain  Abnormal UA with pyuria and bacteriuria   ESBL E Coli bacteremia   Abnormal CT, reviewed by myself, with evidence of L sided pyelonephritis (enlarged kidney with perinephric stranding)   Severe sepsis   Hospital course further complicated by acute renal failure and hyponatremia     Suggest;  Continue Ertapenem 500mg IV Q24h   Urine culture with >100k ESBL Coli  Blood cultures from 3/22 with ESBL E Coli bacteremia   Repeat blood cultures from 3/24 and 3/25 are negative to date   Monitor fever curve and trend WBC count   Monitor kidney function daily while on IV antibiotics   Management of BRADY on CKD and hyponatremia as per Primary Team / Nephrology     Unclear why pt is having fevers now - could be from residual pyelonephritis - would check Doppler b/l LE and repeat CT A/P.

## 2025-03-27 NOTE — PROGRESS NOTE ADULT - PROBLEM SELECTOR PLAN 1
- Etiology: ICM, LVEF 35-40%.  - Clinically mildly hypervolemic w/ warm extremities.   - Has been receiving LR @ 120cc/hr for unclear reason. Please discontinue IVF.  - GDMT: Held for BRADY/hypotension. Midodrine stopped yesterday. BP improving. Start low dose BB Toprol-XL 25 mg at bedtime (home medications are Toprol  mg daily, Entresto  mg BID, and Farxiga 10 mg daily. No MRA 2/2 hyperkalemia). Hold Farxiga 2/2 pyelonephritis.   - Diuretics: She received 1 dose of Lasix 80 mg IV on 3/24 and again 3/26. Dosing intermittently based upon CardioMEMS(home dose Lasix 40 mg BID).  - Please document strict I&Os and daily standing weight.  - Will continue to monitor her CardioMEMS readings. Goal PAD 14. PAD was 23 on 3/24.   - Device: s/p Micra PPM with chronic . - Etiology: ICM, LVEF 35-40%.  - Clinically mildly hypervolemic w/ warm extremities. MEMS remains elevated.  - Has been receiving LR @ 120cc/hr for unclear reason. Please discontinue IVF.  - Diuretics: Recommend Lasix 80 mg IVP BID.  - GDMT: Held for BRADY/hypotension. Midodrine stopped yesterday. BP improving. Start low dose BB Toprol-XL 25 mg at bedtime (home medications are Toprol  mg daily, Entresto  mg BID, and Farxiga 10 mg daily. No MRA 2/2 hyperkalemia). Hold Farxiga 2/2 pyelonephritis.   - Please document strict I&Os and daily standing weight.  - Will continue to monitor her CardioMEMS readings. Goal PAD 14. PAD was 23 on 3/24.   - Device: s/p Micra PPM with chronic .

## 2025-03-27 NOTE — PROGRESS NOTE ADULT - ASSESSMENT
75 y/o female with PMH of HTN, HLD, CAD s/p PCI, HFrEF, Mobitz II s/p PPM (MD NereydaT), PAD with chronic right LE wound, CVA, DM2, CKD3 came to the ED complaining of chest pressure. Found to have acute on chronic CHF and NSTEMI in ED.     Hyponatremia with BRADY ATN on CKD  today sodium 129 improving  and HCO3-17- creat-1.35- -diuretics on hold  ATN post cardiac cath on 03/22 PCI to RCA with ZEINAB and hypotensive event  Hypervolemic hyponatremia,  - avoid further potential nephrotoxins  - cont NaHCO3  - cont PO fluid restriction and PO Urea per renal    #NSTEMI --s/p heparin ,Aspirin--S/p LHC on 03/22/2024 with PCI to RCA with ZEINAB  -Aspirin 81 mg +-Brilinta  -Continue Statin, Ranolazine 500mg bid   -cont Toprol 100mg   -hold Farxiga and Entresto for now- with BRADY  -cont Lipitor to 80qhs   -f/u dr. Bajwa in 1-2 weeks     #Acute on chronic CHFrEF -#HTN/HLD/CAD   - Received IV lasix. now on hold as per cardiomem data- interpretation per-Heart failure team   -daily weight   -Farxiga 10mg on hold with infection  -Entresto on hold due to BRADY     #Pyelonephritis-with complicating # ESBL Bacteremia  -Ertapenem--ID following   -CT + right pyelonephritis  -Repeat blood culture on 03/24- ngtd, rpt UA still ++    #DM-2   Lantus 26 units HS  Insulin sliding scale     #Anemia -Likely of chronic disease    worse Hgb- PRBC ordered   Hgb stable   Iron tab     Dispo- PT eval, medically active still- hyponatremia and MA -improving - on active IVF with bicarb- DW Renal-cont IV abx        73 y/o female with PMH of HTN, HLD, CAD s/p PCI, HFrEF, Mobitz II s/p PPM (MD NereydaT), PAD with chronic right LE wound, CVA, DM2, CKD3 came to the ED complaining of chest pressure. Found to have acute on chronic CHF and NSTEMI in ED.     Hyponatremia with BRADY ATN on CKD  today sodium 129 improving  and HCO3-17- creat-1.35- -diuretics on hold  ATN post cardiac cath on 03/22 PCI to RCA with ZEINAB and hypotensive event  Hypervolemic hyponatremia,  - avoid further potential nephrotoxins  - cont NaHCO3  - cont PO fluid restriction and PO Urea per renal    #NSTEMI --s/p heparin ,Aspirin--S/p LHC on 03/22/2024 with PCI to RCA with ZEINAB  -Aspirin 81 mg +-Brilinta  -Continue Statin, Ranolazine 500mg bid   -cont Toprol 100mg   -hold Farxiga and Entresto for now- with BRADY  -cont Lipitor to 80qhs   -f/u dr. Bajwa in 1-2 weeks     #Acute on chronic CHFrEF -#HTN/HLD/CAD   - Received IV lasix. now on hold as per cardiomem data- interpretation per-Heart failure team   -daily weight   -Farxiga 10mg on hold with infection  -Entresto on hold due to BRADY     #Pyelonephritis-with complicating # ESBL Bacteremia  -Ertapenem--ID following   -CT + right pyelonephritis  -Repeat blood culture on 03/24- ngtd, rpt UA still ++    #DM-2   Lantus 26 units HS  Insulin sliding scale     #Anemia -Likely of chronic disease    worse Hgb- PRBC ordered   Hgb stable   Iron tab     Dispo- PT eval, medically active still- having fevers- getting doppler LE - DW ID   hyponatremia and MA -improving - on active IVF with bicarb- DW Renal-cont IV abx

## 2025-03-27 NOTE — PROGRESS NOTE ADULT - SUBJECTIVE AND OBJECTIVE BOX
[facility]  INFECTIOUS DISEASES at [place] / [place] [facility]  =======================================================                              Leo Lam MD                              Professor Emeritus:  Dr Angelo Sebastian MD            Diplomates American Board of Internal Medicine & Infectious Diseases                                   Tel  [phone] Fax [phone]                                  Hospital Consult line:  [phone]  =======================================================    Follow Up: Bacteremia     Interval History/ROS: Seen at bedside. Had fevers yesterday and overnight.     REVIEW OF SYSTEMS  Unchanged from prior     Allergies  No Known Allergies    ANTIMICROBIALS:    ertapenem  IVPB 500 every 24 hours    OTHER MEDS: MEDICATIONS  (STANDING):  acetaminophen     Tablet .. 650 every 6 hours PRN  albuterol/ipratropium for Nebulization 3 every 6 hours  aluminum hydroxide/magnesium hydroxide/simethicone Suspension 30 every 4 hours PRN  aspirin enteric coated 81 daily  atorvastatin 80 at bedtime  dextrose 50% Injectable 25 once  dextrose 50% Injectable 12.5 once  dextrose 50% Injectable 25 once  dextrose Oral Gel 15 once PRN  epoetin kristina-epbx (RETACRIT) Injectable 90996 <User Schedule>  gabapentin 400 at bedtime  glucagon  Injectable 1 once  HYDROmorphone  Injectable 0.5 once  insulin glargine Injectable (LANTUS) 26 at bedtime  insulin lispro (ADMELOG) corrective regimen sliding scale  three times a day before meals  melatonin 3 at bedtime PRN  ondansetron Injectable 4 every 8 hours PRN  oxyCODONE    IR 5 every 6 hours PRN  ranolazine 500 two times a day  ticagrelor 90 two times a day  urea Oral Powder 15 daily    Vital Signs Last 24 Hrs  T(F): 97.6 (03-27-25 @ 13:21), Max: 101.8 (03-24-25 @ 17:57)    Vital Signs Last 24 Hrs  HR: 72 (03-27-25 @ 13:21) (60 - 72)  BP: 143/72 (03-27-25 @ 13:21) (95/51 - 143/72)  RR: 18 (03-27-25 @ 13:21)  SpO2: 99% (03-27-25 @ 13:21) (95% - 100%)  Wt(kg): --    EXAM:  General: NAD   HEENT: NCAT  CV: S1+S2  Lungs: No acute respiratory distress  Abd: Soft  : [place]   Ext: No cyanosis  Neuro: Calm   Skin: No rash     Labs:                        8.0    9.87  )-----------( 385      ( 27 Mar 2025 06:35 )             23.6     03-27    129[L]  |  94[L]  |  84.6[H]  ----------------------------<  140[H]  4.7   |  21.0[L]  |  3.08[H]    Ca    7.0[L]      27 Mar 2025 06:35    TPro  5.6[L]  /  Alb  2.3[L]  /  TBili  0.7  /  DBili  x   /  AST  36[H]  /  ALT  23  /  AlkPhos  244[H]  03-27    WBC Trend:  WBC Count: 9.87 (03-27-25 @ 06:35)  WBC Count: 11.92 (03-26-25 @ 11:34)  WBC Count: 13.44 (03-25-25 @ 08:09)  WBC Count: 15.80 (03-24-25 @ 05:20)    Creatine Trend:  Creatinine: 3.08 (03-27)  Creatinine: 3.46 (03-26)  Creatinine: 3.54 (03-25)  Creatinine: 3.58 (03-25)    Liver Biochemical Testing Trend:  Alanine Aminotransferase (ALT/SGPT): 23 (03-27)  Alanine Aminotransferase (ALT/SGPT): 25 (03-26)  Alanine Aminotransferase (ALT/SGPT): 20 (03-25)  Alanine Aminotransferase (ALT/SGPT): 11 (03-23)  Alanine Aminotransferase (ALT/SGPT): 19 (03-19)  Aspartate Aminotransferase (AST/SGOT): 36 (03-27-25 @ 06:35)  Aspartate Aminotransferase (AST/SGOT): 59 (03-26-25 @ 06:09)  Aspartate Aminotransferase (AST/SGOT): 38 (03-25-25 @ 01:20)  Aspartate Aminotransferase (AST/SGOT): 20 (03-23-25 @ 06:15)  Aspartate Aminotransferase (AST/SGOT): 27 (03-19-25 @ 22:08)  Bilirubin Total: 0.7 (03-27)  Bilirubin Total: 0.7 (03-26)  Bilirubin Total: 0.7 (03-25)  Bilirubin Total: 0.7 (03-23)  Bilirubin Total: 0.9 (03-19)    Urinalysis Basic - ( 27 Mar 2025 06:35 )    Color: x / Appearance: x / SG: x / pH: x  Gluc: 140 mg/dL / Ketone: x  / Bili: x / Urobili: x   Blood: x / Protein: x / Nitrite: x   Leuk Esterase: x / RBC: x / WBC x   Sq Epi: x / Non Sq Epi: x / Bacteria: x    MICROBIOLOGY:    MRSA PCR Result.: NotDetec (03-20-25 @ 13:30)  MRSA PCR Result.: NotDetec (01-13-25 @ 04:00)  MRSA PCR Result.: NotDetec (10-14-24 @ 04:00)  MRSA PCR Result.: NotDetec (07-12-24 @ 17:45)  MRSA PCR Result.: NotDetec (05-10-24 @ 10:10)    Culture - Blood (collected 25 Mar 2025 08:09)  Source: Blood Blood-Peripheral  Preliminary Report:    No growth at 24 hours    Culture - Blood (collected 25 Mar 2025 08:00)  Source: Blood Blood-Peripheral  Preliminary Report:    No growth at 24 hours    Culture - Blood (collected 24 Mar 2025 05:28)  Source: Blood Blood-Venous  Preliminary Report:    No growth at 72 Hours    Culture - Blood (collected 24 Mar 2025 05:20)  Source: Blood Blood-Venous  Preliminary Report:    No growth at 72 Hours    Culture - Urine (collected 22 Mar 2025 15:30)  Source: Catheterized Catheterized  Final Report:    >100,000 CFU/ml Escherichia coli ESBL  Organism: Escherichia coli ESBL  Organism: Escherichia coli ESBL    Sensitivities:      Method Type: RENNY      -  Ampicillin: R >16 These ampicillin results predict results for amoxicillin      -  Ampicillin/Sulbactam: R >16/8      -  Aztreonam: R >16      -  Cefazolin: R >16 For uncomplicated UTI with K. pneumoniae, E. coli, or P. mirablis: RENNY <=16 is sensitive and RENNY >=32 is resistant. This also predicts results for oral agents cefaclor, cefdinir, cefpodoxime, cefprozil, cefuroxime axetil, cephalexin and locarbef for uncomplicated UTI. Note that some isolates may be susceptible to these agents while testing resistant to cefazolin.      -  Cefepime: R >16      -  Ceftriaxone: R >32      -  Cefuroxime: R >16      -  Ciprofloxacin: R >2      -  Ertapenem: S <=0.5      -  Gentamicin: S <=2      -  Imipenem: S <=1      -  Levofloxacin: R >4      -  Meropenem: S <=1      -  Nitrofurantoin: S <=32 Should not be used to treat pyelonephritis      -  Piperacillin/Tazobactam: S <=8      -  Tobramycin: S <=2      -  Trimethoprim/Sulfamethoxazole: R >2/38    Culture - Blood (collected 22 Mar 2025 15:20)  Source: Blood Blood-Peripheral  Final Report:    Growth in aerobic and anaerobic bottles: Escherichia coli ESBL    Direct identification is available within approximately 3-5    hours either by Blood Panel Multiplexed PCR or Direct    MALDI-TOF. Details: [email or web address]  Organism: Blood Culture PCR  Escherichia coli ESBL  Organism: Escherichia coli ESBL    Sensitivities:      Method Type: RENNY      -  Ampicillin: R >16 These ampicillin results predict results for amoxicillin      -  Ampicillin/Sulbactam: R >16/8      -  Aztreonam: R >16      -  Cefazolin: R >16      -  Cefepime: R >16      -  Ceftriaxone: R >32      -  Ciprofloxacin: R >2      -  Ertapenem: S <=0.5      -  Gentamicin: S <=2      -  Imipenem: S <=1      -  Levofloxacin: R >4      -  Meropenem: S <=1      -  Piperacillin/Tazobactam: R <=8      -  Tobramycin: S <=2      -  Trimethoprim/Sulfamethoxazole: R >2/38  Organism: Blood Culture PCR    Sensitivities:      Method Type: PCR      -  Escherichia coli: Detec      -  ESBL: Detec      -  CTX-M Resistance Marker: Detec    Culture - Blood (collected 22 Mar 2025 15:12)  Source: Blood Blood-Peripheral  Final Report:    Growth in aerobic and anaerobic bottles: Escherichia coli    See previous culture [id number]    Culture - Urine (collected 11 Dec 2024 10:52)  Source: Catheterized Catheterized  Final Report:    >100,000 CFU/ml Escherichia coli ESBL  Organism: Escherichia coli ESBL  Organism: Escherichia coli ESBL    Sensitivities:      Method Type: RENNY      -  Ampicillin: R >16 These ampicillin results predict results for amoxicillin      -  Ampicillin/Sulbactam: R >16/8      -  Aztreonam: R >16      -  Cefazolin: R >16 For uncomplicated UTI with K. pneumoniae, E. coli, or P. mirablis: RENNY <=16 is sensitive and RENNY >=32 is resistant. This also predicts results for oral agents cefaclor, cefdinir, cefpodoxime, cefprozil, cefuroxime axetil, cephalexin and locarbef for uncomplicated UTI. Note that some isolates may be susceptible to these agents while testing resistant to cefazolin.      -  Cefepime: R >16      -  Ceftriaxone: R >32      -  Cefuroxime: R >16      -  Ciprofloxacin: R >2      -  Ertapenem: S <=0.5      -  Gentamicin: S <=2      -  Imipenem: S <=1      -  Levofloxacin: R >4      -  Meropenem: S <=1      -  Nitrofurantoin: S <=32 Should not be used to treat pyelonephritis      -  Piperacillin/Tazobactam: R >64      -  Tobramycin: S <=2      -  Trimethoprim/Sulfamethoxazole: R >2/38    Culture - Urine (collected 08 Oct 2024 14:19)  Source: Clean Catch Clean Catch (Midstream)  Final Report:    >100,000 CFU/ml Escherichia coli ESBL  Organism: Escherichia coli ESBL  Organism: Escherichia coli ESBL    Sensitivities:      Method Type: RENNY      -  Ampicillin: R >16 These ampicillin results predict results for amoxicillin      -  Ampicillin/Sulbactam: R >16/8      -  Aztreonam: R >16      -  Cefazolin: R >16 For uncomplicated UTI with K. pneumoniae, E. coli, or P. mirablis: RENNY <=16 is sensitive and RENNY >=32 is resistant. This also predicts results for oral agents cefaclor, cefdinir, cefpodoxime, cefprozil, cefuroxime axetil, cephalexin and locarbef for uncomplicated UTI. Note that some isolates may be susceptible to these agents while testing resistant to cefazolin.      -  Cefepime: R >16      -  Ceftriaxone: R >32      -  Cefuroxime: R >16      -  Ciprofloxacin: R >2      -  Ertapenem: S <=0.5      -  Gentamicin: S <=2      -  Imipenem: S <=1      -  Levofloxacin: R >4      -  Meropenem: S <=1      -  Nitrofurantoin: S <=32 Should not be used to treat pyelonephritis      -  Piperacillin/Tazobactam: S <=8      -  Tobramycin: S <=2      -  Trimethoprim/Sulfamethoxazole: R >2/38    Culture - Blood (collected 10 May 2024 03:37)  Source: .Blood Blood  Final Report:    No growth at 5 days    Rapid RVP Result: NotDetec (03-22 @ 14:27)    Ferritin: 350 (03-25)    Blood Gas Venous - Lactate: 1.20 (03-25 @ 01:20)  Lactate, Blood: 1.7 (03-24 @ 14:51)    RADIOLOGY:  imaging below personally reviewed    < from: CT Abdomen and Pelvis No Cont (03.22.25 @ 13:45) >  IMPRESSION:  *  No evidence of retroperitoneal hemorrhage or ileus.  *  Nondependent locules of air in the bladder and left ureter without   perivesicular fat stranding, nonspecific, could be secondary to recent   instrumentation versus cystitis. Recommend clinical correlation.  *  Enlarged, edematous left kidney with increased perinephric fat   stranding and air in the left renal collecting system can be seen in the   setting of pyelonephritis/ureteritis. Evaluation is limited due to   noncontrast technique. Correlation with laboratory values suggested.    < end of copied text >

## 2025-03-27 NOTE — PROGRESS NOTE ADULT - SUBJECTIVE AND OBJECTIVE BOX
[facility] Advanced Heart Failure - Progress Note  [address] [place] [address]  Office Phone: [phone]/Fax: [phone]  Service/On Call Phone [phone]    Subjective/Objective: No acute events overnight. Pt. up in chair. Reports RAMIREZ. She is being downgraded to 4T.    Medications:  acetaminophen     Tablet .. 650 milliGRAM(s) Oral every 6 hours PRN  albuterol/ipratropium for Nebulization 3 milliLiter(s) Nebulizer every 6 hours  aluminum hydroxide/magnesium hydroxide/simethicone Suspension 30 milliLiter(s) Oral every 4 hours PRN  aspirin enteric coated 81 milliGRAM(s) Oral daily  atorvastatin 80 milliGRAM(s) Oral at bedtime  calcitriol   Capsule 0.25 MICROGram(s) Oral daily  calcium carbonate   1250 mG (OsCal) 1 Tablet(s) Oral three times a day  chlorhexidine 2% Cloths 1 Application(s) Topical daily  dextrose 5%. 1000 milliLiter(s) IV Continuous <Continuous>  dextrose 5%. 1000 milliLiter(s) IV Continuous <Continuous>  dextrose 50% Injectable 25 Gram(s) IV Push once  dextrose 50% Injectable 12.5 Gram(s) IV Push once  dextrose 50% Injectable 25 Gram(s) IV Push once  dextrose Oral Gel 15 Gram(s) Oral once PRN  epoetin kristina-epbx (RETACRIT) Injectable 27312 Unit(s) SubCutaneous <User Schedule>  ertapenem  IVPB 500 milliGRAM(s) IV Intermittent every 24 hours  ferrous    sulfate 325 milliGRAM(s) Oral daily  gabapentin 400 milliGRAM(s) Oral at bedtime  glucagon  Injectable 1 milliGRAM(s) IntraMuscular once  HYDROmorphone  Injectable 0.5 milliGRAM(s) IV Push once  insulin glargine Injectable (LANTUS) 26 Unit(s) SubCutaneous at bedtime  insulin lispro (ADMELOG) corrective regimen sliding scale   SubCutaneous three times a day before meals  iron sucrose IVPB 200 milliGRAM(s) IV Intermittent every 24 hours  melatonin 3 milliGRAM(s) Oral at bedtime PRN  metoprolol succinate ER 25 milliGRAM(s) Oral at bedtime  ondansetron Injectable 4 milliGRAM(s) IV Push every 8 hours PRN  oxyCODONE    IR 5 milliGRAM(s) Oral every 6 hours PRN  ranolazine 500 milliGRAM(s) Oral two times a day  sodium bicarbonate 1300 milliGRAM(s) Oral three times a day  sodium chloride 0.45% 1000 milliLiter(s) IV Continuous <Continuous>  sodium chloride 2% . 1000 milliLiter(s) IV Continuous <Continuous>  sodium chloride 2% . 1000 milliLiter(s) IV Continuous <Continuous>  ticagrelor 90 milliGRAM(s) Oral two times a day  urea Oral Powder 15 Gram(s) Oral daily    Vital Signs Last 24 Hours  T(C): 36.4 (03-27-25 @ 13:21), Max: 38.4 (03-27-25 @ 02:17)  HR: 72 (03-27-25 @ 13:21) (60 - 72)  BP: 143/72 (03-27-25 @ 13:21) (95/51 - 143/72)  RR: 18 (03-27-25 @ 13:21) (18 - 18)  SpO2: 99% (03-27-25 @ 13:21) (95% - 100%)      I&O's Summary  26 Mar 2025 07:01  -  27 Mar 2025 07:00  --------------------------------------------------------  IN: 615 mL / OUT: 600 mL / NET: 15 mL    27 Mar 2025 07:01  -  27 Mar 2025 14:10  --------------------------------------------------------  IN: 360 mL / OUT: 475 mL / NET: -115 mL    Tele: SR 60s-70    Physical Exam:  General: Sitting up in chair, in no distress.   HEENT: EOMs intact.  Neck: Neck supple. JVP not elevated.   Chest: Clear to auscultation bilaterally.  CV: RRR. Normal S1 and S2. No murmurs, rub, or gallops. Warm peripherally.  PV: 1+ RLE edema.  Abdomen: Soft, obese  Skin: warm, dry, no rash.  Neurology: Alert and oriented times three. Sensation intact.  Psych: Appropriate affect.    Labs:                        8.0    9.87  )-----------( 385      ( 27 Mar 2025 06:35 )             23.6     03-27    129[L]  |  94[L]  |  84.6[H]  ----------------------------<  140[H]  4.7   |  21.0[L]  |  3.08[H]    Ca    7.0[L]      27 Mar 2025 06:35    TPro  5.6[L]  /  Alb  2.3[L]  /  TBili  0.7  /  DBili  x   /  AST  36[H]  /  ALT  23  /  AlkPhos  244[H]  03-27      Lactate, Blood: 1.7 mmol/L (03-24 @ 14:51)         [facility] Advanced Heart Failure - Progress Note  [address] [place] [address]  Office Phone: [phone]/Fax: [phone]  Service/On Call Phone [phone]    Subjective/Objective: Pt. seen/examined at bedside.  Augusto-ID [id number] utilized. No acute events overnight. Pt. up in chair. Reports RAMIREZ. She is being downgraded to 4T.    Medications:  acetaminophen     Tablet .. 650 milliGRAM(s) Oral every 6 hours PRN  albuterol/ipratropium for Nebulization 3 milliLiter(s) Nebulizer every 6 hours  aluminum hydroxide/magnesium hydroxide/simethicone Suspension 30 milliLiter(s) Oral every 4 hours PRN  aspirin enteric coated 81 milliGRAM(s) Oral daily  atorvastatin 80 milliGRAM(s) Oral at bedtime  calcitriol   Capsule 0.25 MICROGram(s) Oral daily  calcium carbonate   1250 mG (OsCal) 1 Tablet(s) Oral three times a day  chlorhexidine 2% Cloths 1 Application(s) Topical daily  dextrose 5%. 1000 milliLiter(s) IV Continuous <Continuous>  dextrose 5%. 1000 milliLiter(s) IV Continuous <Continuous>  dextrose 50% Injectable 25 Gram(s) IV Push once  dextrose 50% Injectable 12.5 Gram(s) IV Push once  dextrose 50% Injectable 25 Gram(s) IV Push once  dextrose Oral Gel 15 Gram(s) Oral once PRN  epoetin kristina-epbx (RETACRIT) Injectable 16958 Unit(s) SubCutaneous <User Schedule>  ertapenem  IVPB 500 milliGRAM(s) IV Intermittent every 24 hours  ferrous    sulfate 325 milliGRAM(s) Oral daily  gabapentin 400 milliGRAM(s) Oral at bedtime  glucagon  Injectable 1 milliGRAM(s) IntraMuscular once  HYDROmorphone  Injectable 0.5 milliGRAM(s) IV Push once  insulin glargine Injectable (LANTUS) 26 Unit(s) SubCutaneous at bedtime  insulin lispro (ADMELOG) corrective regimen sliding scale   SubCutaneous three times a day before meals  iron sucrose IVPB 200 milliGRAM(s) IV Intermittent every 24 hours  melatonin 3 milliGRAM(s) Oral at bedtime PRN  metoprolol succinate ER 25 milliGRAM(s) Oral at bedtime  ondansetron Injectable 4 milliGRAM(s) IV Push every 8 hours PRN  oxyCODONE    IR 5 milliGRAM(s) Oral every 6 hours PRN  ranolazine 500 milliGRAM(s) Oral two times a day  sodium bicarbonate 1300 milliGRAM(s) Oral three times a day  sodium chloride 0.45% 1000 milliLiter(s) IV Continuous <Continuous>  sodium chloride 2% . 1000 milliLiter(s) IV Continuous <Continuous>  sodium chloride 2% . 1000 milliLiter(s) IV Continuous <Continuous>  ticagrelor 90 milliGRAM(s) Oral two times a day  urea Oral Powder 15 Gram(s) Oral daily    Vital Signs Last 24 Hours  T(C): 36.4 (03-27-25 @ 13:21), Max: 38.4 (03-27-25 @ 02:17)  HR: 72 (03-27-25 @ 13:21) (60 - 72)  BP: 143/72 (03-27-25 @ 13:21) (95/51 - 143/72)  RR: 18 (03-27-25 @ 13:21) (18 - 18)  SpO2: 99% (03-27-25 @ 13:21) (95% - 100%)      I&O's Summary  26 Mar 2025 07:01  -  27 Mar 2025 07:00  --------------------------------------------------------  IN: 615 mL / OUT: 600 mL / NET: 15 mL    27 Mar 2025 07:01  -  27 Mar 2025 14:10  --------------------------------------------------------  IN: 360 mL / OUT: 475 mL / NET: -115 mL    Tele: SR 60s-70    Physical Exam:  General: Sitting up in chair, in no distress.   HEENT: EOMs intact.  Neck: Neck supple. JVP not elevated.   Chest: Clear to auscultation bilaterally.  CV: RRR. Normal S1 and S2. No murmurs, rub, or gallops. Warm peripherally.  PV: 1+ RLE edema.  Abdomen: Soft, obese  Skin: warm, dry, no rash.  Neurology: Alert and oriented times three. Sensation intact.  Psych: Appropriate affect.    Labs:                        8.0    9.87  )-----------( 385      ( 27 Mar 2025 06:35 )             23.6     03-27    129[L]  |  94[L]  |  84.6[H]  ----------------------------<  140[H]  4.7   |  21.0[L]  |  3.08[H]    Ca    7.0[L]      27 Mar 2025 06:35    TPro  5.6[L]  /  Alb  2.3[L]  /  TBili  0.7  /  DBili  x   /  AST  36[H]  /  ALT  23  /  AlkPhos  244[H]  03-27      Lactate, Blood: 1.7 mmol/L (03-24 @ 14:51)

## 2025-03-27 NOTE — PROGRESS NOTE ADULT - SUBJECTIVE AND OBJECTIVE BOX
SHIREEN CASH Patient is a 74y old  Female who presents with a chief complaint of    HPI:  73 y/o female with PMH of HTN, HLD, CAD s/p PCI, HFrEF, Mobitz II s/p PPM (MD NereydaT), PAD with chronic right LE wound, CVA, DM2, CKD3 came to the ED complaining of chest pressure. Patient reported midsternal pressure, non-radiating going for couple of days. As per granddaughter (Lexi) at bedside, patient has been complaining of cough (dry), fever and body aches x 3 days. Lexi also said she got a call from cardiology saying to increase her to Lasix 40mg to tid (from bid) because her CardioMems was not at goal; which she has been doing for the past 2 days. She went to see her PCP yesterday for the fever and body ache, noted to have weight gain, PCP spoke to cardiology who advise to come to the ED. Patient has no shortness of breath, palpitation, nausea, vomiting, abdominal pain, change in bowel/urinary habit, recent travel, sick contact.        (20 Mar 2025 02:51)    The patient was seen and evaluated sitting in recliner - answers appropriately feels better - very weak still- being treated fro pyelonephritis and bacteremia- aware hyponatremia and BRADY improving   The patient is in no acute distress.        I&O's Summary    26 Mar 2025 07:01  -  27 Mar 2025 07:00  --------------------------------------------------------  IN: 615 mL / OUT: 600 mL / NET: 15 mL    27 Mar 2025 07:01  -  27 Mar 2025 16:09  --------------------------------------------------------  IN: 540 mL / OUT: 825 mL / NET: -285 mL      Allergies    No Known Allergies    Intolerances      HEALTH ISSUES - PROBLEM Dx:  Non-ST elevation MI (NSTEMI)    Acute on chronic HFrEF (heart failure with reduced ejection fraction)    Acute kidney injury superimposed on CKD    Bacteremia    Anemia of chronic disease          PAST MEDICAL & SURGICAL HISTORY:  DM (diabetes mellitus)      HTN (hypertension)      H/O gastroesophageal reflux (GERD)      Cardiac pacemaker  MICRA for mobitz type 11      CVA (cerebrovascular accident)  resdiual right sided weakness      CVA (cerebrovascular accident)      PAD (peripheral artery disease)      Wound of right leg      History of benign brain tumor      Cataract      History of biopsy      Cardiac pacemaker      S/P angiogram of extremity              Vital Signs Last 24 Hrs  T(C): 36.4 (27 Mar 2025 13:21), Max: 38.4 (27 Mar 2025 02:17)  T(F): 97.6 (27 Mar 2025 13:21), Max: 101.1 (27 Mar 2025 02:17)  HR: 70 (27 Mar 2025 14:57) (60 - 72)  BP: 143/72 (27 Mar 2025 13:21) (95/51 - 143/72)  BP(mean): 76 (27 Mar 2025 12:00) (58 - 76)  RR: 18 (27 Mar 2025 13:21) (18 - 18)  SpO2: 99% (27 Mar 2025 14:57) (96% - 100%)    Parameters below as of 27 Mar 2025 14:57  Patient On (Oxygen Delivery Method): room air    T(C): 36.4 (03-27-25 @ 13:21), Max: 38.4 (03-27-25 @ 02:17)  HR: 70 (03-27-25 @ 14:57) (60 - 72)  BP: 143/72 (03-27-25 @ 13:21) (95/51 - 143/72)  RR: 18 (03-27-25 @ 13:21) (18 - 18)  SpO2: 99% (03-27-25 @ 14:57) (96% - 100%)  Wt(kg): --    PHYSICAL EXAM:    GENERAL: NAD conversing   HEAD:  Atraumatic, Normocephalic  EYES: EOMI, conjunctiva and sclera clear  ENMT:  Moist mucous membranes,  NERVOUS SYSTEM:  Alert & Oriented X3,  Moves upper and lower extremities; CNS-II-XII  CHEST/LUNG: Clear to auscultation bilaterally;   HEART: Regular rate and rhythm;   ABDOMEN: Soft, Nontender, Nondistended; Bowel sounds present  EXTREMITIES:  Peripheral Pulses,   psychiatry- mood and affect appropriate, Insight and judgement intact     acetaminophen     Tablet .. 650 milliGRAM(s) Oral every 6 hours PRN  albuterol/ipratropium for Nebulization 3 milliLiter(s) Nebulizer every 6 hours  aluminum hydroxide/magnesium hydroxide/simethicone Suspension 30 milliLiter(s) Oral every 4 hours PRN  aspirin enteric coated 81 milliGRAM(s) Oral daily  atorvastatin 80 milliGRAM(s) Oral at bedtime  calcitriol   Capsule 0.25 MICROGram(s) Oral daily  calcium carbonate   1250 mG (OsCal) 1 Tablet(s) Oral three times a day  chlorhexidine 2% Cloths 1 Application(s) Topical daily  dextrose 5%. 1000 milliLiter(s) IV Continuous <Continuous>  dextrose 5%. 1000 milliLiter(s) IV Continuous <Continuous>  dextrose 50% Injectable 25 Gram(s) IV Push once  dextrose 50% Injectable 12.5 Gram(s) IV Push once  dextrose 50% Injectable 25 Gram(s) IV Push once  dextrose Oral Gel 15 Gram(s) Oral once PRN  epoetin kristina-epbx (RETACRIT) Injectable 11543 Unit(s) SubCutaneous <User Schedule>  ertapenem  IVPB 500 milliGRAM(s) IV Intermittent every 24 hours  ferrous    sulfate 325 milliGRAM(s) Oral daily  gabapentin 400 milliGRAM(s) Oral at bedtime  glucagon  Injectable 1 milliGRAM(s) IntraMuscular once  HYDROmorphone  Injectable 0.5 milliGRAM(s) IV Push once  insulin glargine Injectable (LANTUS) 26 Unit(s) SubCutaneous at bedtime  insulin lispro (ADMELOG) corrective regimen sliding scale   SubCutaneous three times a day before meals  iron sucrose IVPB 200 milliGRAM(s) IV Intermittent every 24 hours  melatonin 3 milliGRAM(s) Oral at bedtime PRN  metoprolol succinate ER 25 milliGRAM(s) Oral at bedtime  ondansetron Injectable 4 milliGRAM(s) IV Push every 8 hours PRN  oxyCODONE    IR 5 milliGRAM(s) Oral every 6 hours PRN  ranolazine 500 milliGRAM(s) Oral two times a day  sodium bicarbonate 1300 milliGRAM(s) Oral three times a day  sodium chloride 0.45% 1000 milliLiter(s) IV Continuous <Continuous>  sodium chloride 2% . 1000 milliLiter(s) IV Continuous <Continuous>  sodium chloride 2% . 1000 milliLiter(s) IV Continuous <Continuous>  ticagrelor 90 milliGRAM(s) Oral two times a day  urea Oral Powder 15 Gram(s) Oral daily      LABS:                          8.0    9.87  )-----------( 385      ( 27 Mar 2025 06:35 )             23.6     03-27    129[L]  |  94[L]  |  84.6[H]  ----------------------------<  140[H]  4.7   |  21.0[L]  |  3.08[H]    Ca    7.0[L]      27 Mar 2025 06:35    TPro  5.6[L]  /  Alb  2.3[L]  /  TBili  0.7  /  DBili  x   /  AST  36[H]  /  ALT  23  /  AlkPhos  244[H]  03-27    LIVER FUNCTIONS - ( 27 Mar 2025 06:35 )  Alb: 2.3 g/dL / Pro: 5.6 g/dL / ALK PHOS: 244 U/L / ALT: 23 U/L / AST: 36 U/L / GGT: x                 Urinalysis Basic - ( 27 Mar 2025 06:35 )    Color: x / Appearance: x / SG: x / pH: x  Gluc: 140 mg/dL / Ketone: x  / Bili: x / Urobili: x   Blood: x / Protein: x / Nitrite: x   Leuk Esterase: x / RBC: x / WBC x   Sq Epi: x / Non Sq Epi: x / Bacteria: x      CAPILLARY BLOOD GLUCOSE      POCT Blood Glucose.: 140 mg/dL (27 Mar 2025 12:35)  POCT Blood Glucose.: 113 mg/dL (27 Mar 2025 09:33)  POCT Blood Glucose.: 220 mg/dL (26 Mar 2025 22:28)  POCT Blood Glucose.: 233 mg/dL (26 Mar 2025 17:31)      RADIOLOGY & ADDITIONAL TESTS:      Consultant notes reviewed  I independently reviwed all images/ labarotory results /cultures/ telemetry/EKG and my findings are indicated above  and discussed care plan with consultants/nursing/ family /patient  SHIREEN CASH Patient is a 74y old  Female who presents with a chief complaint of    HPI:  73 y/o female with PMH of HTN, HLD, CAD s/p PCI, HFrEF, Mobitz II s/p PPM (MD NereydaT), PAD with chronic right LE wound, CVA, DM2, CKD3 came to the ED complaining of chest pressure. Patient reported midsternal pressure, non-radiating going for couple of days. As per granddaughter (Lexi) at bedside, patient has been complaining of cough (dry), fever and body aches x 3 days. Lexi also said she got a call from cardiology saying to increase her to Lasix 40mg to tid (from bid) because her CardioMems was not at goal; which she has been doing for the past 2 days. She went to see her PCP yesterday for the fever and body ache, noted to have weight gain, PCP spoke to cardiology who advise to come to the ED. Patient has no shortness of breath, palpitation, nausea, vomiting, abdominal pain, change in bowel/urinary habit, recent travel, sick contact.        (20 Mar 2025 02:51)    The patient was seen and evaluated sitting in recliner - answers appropriately feels better - very weak still- being treated fro pyelonephritis and bacteremia- aware hyponatremia and BRADY improving - but still having fevers-will check LE dopplers   The patient is in no acute distress.        I&O's Summary    26 Mar 2025 07:01  -  27 Mar 2025 07:00  --------------------------------------------------------  IN: 615 mL / OUT: 600 mL / NET: 15 mL    27 Mar 2025 07:01  -  27 Mar 2025 16:09  --------------------------------------------------------  IN: 540 mL / OUT: 825 mL / NET: -285 mL      Allergies    No Known Allergies    Intolerances      HEALTH ISSUES - PROBLEM Dx:  Non-ST elevation MI (NSTEMI)    Acute on chronic HFrEF (heart failure with reduced ejection fraction)    Acute kidney injury superimposed on CKD    Bacteremia    Anemia of chronic disease          PAST MEDICAL & SURGICAL HISTORY:  DM (diabetes mellitus)      HTN (hypertension)      H/O gastroesophageal reflux (GERD)      Cardiac pacemaker  MICRA for mobitz type 11      CVA (cerebrovascular accident)  resdiual right sided weakness      CVA (cerebrovascular accident)      PAD (peripheral artery disease)      Wound of right leg      History of benign brain tumor      Cataract      History of biopsy      Cardiac pacemaker      S/P angiogram of extremity              Vital Signs Last 24 Hrs  T(C): 36.4 (27 Mar 2025 13:21), Max: 38.4 (27 Mar 2025 02:17)  T(F): 97.6 (27 Mar 2025 13:21), Max: 101.1 (27 Mar 2025 02:17)  HR: 70 (27 Mar 2025 14:57) (60 - 72)  BP: 143/72 (27 Mar 2025 13:21) (95/51 - 143/72)  BP(mean): 76 (27 Mar 2025 12:00) (58 - 76)  RR: 18 (27 Mar 2025 13:21) (18 - 18)  SpO2: 99% (27 Mar 2025 14:57) (96% - 100%)    Parameters below as of 27 Mar 2025 14:57  Patient On (Oxygen Delivery Method): room air    T(C): 36.4 (03-27-25 @ 13:21), Max: 38.4 (03-27-25 @ 02:17)  HR: 70 (03-27-25 @ 14:57) (60 - 72)  BP: 143/72 (03-27-25 @ 13:21) (95/51 - 143/72)  RR: 18 (03-27-25 @ 13:21) (18 - 18)  SpO2: 99% (03-27-25 @ 14:57) (96% - 100%)  Wt(kg): --    PHYSICAL EXAM:    GENERAL: NAD conversing   HEAD:  Atraumatic, Normocephalic  EYES: EOMI, conjunctiva and sclera clear  ENMT:  Moist mucous membranes,  NERVOUS SYSTEM:  Alert & Oriented X3,  Moves upper and lower extremities; CNS-II-XII  CHEST/LUNG: Clear to auscultation bilaterally;   HEART: Regular rate and rhythm;   ABDOMEN: Soft, Nontender, Nondistended; Bowel sounds present  EXTREMITIES:  Peripheral Pulses,   psychiatry- mood and affect appropriate, Insight and judgement intact     acetaminophen     Tablet .. 650 milliGRAM(s) Oral every 6 hours PRN  albuterol/ipratropium for Nebulization 3 milliLiter(s) Nebulizer every 6 hours  aluminum hydroxide/magnesium hydroxide/simethicone Suspension 30 milliLiter(s) Oral every 4 hours PRN  aspirin enteric coated 81 milliGRAM(s) Oral daily  atorvastatin 80 milliGRAM(s) Oral at bedtime  calcitriol   Capsule 0.25 MICROGram(s) Oral daily  calcium carbonate   1250 mG (OsCal) 1 Tablet(s) Oral three times a day  chlorhexidine 2% Cloths 1 Application(s) Topical daily  dextrose 5%. 1000 milliLiter(s) IV Continuous <Continuous>  dextrose 5%. 1000 milliLiter(s) IV Continuous <Continuous>  dextrose 50% Injectable 25 Gram(s) IV Push once  dextrose 50% Injectable 12.5 Gram(s) IV Push once  dextrose 50% Injectable 25 Gram(s) IV Push once  dextrose Oral Gel 15 Gram(s) Oral once PRN  epoetin kristina-epbx (RETACRIT) Injectable 57680 Unit(s) SubCutaneous <User Schedule>  ertapenem  IVPB 500 milliGRAM(s) IV Intermittent every 24 hours  ferrous    sulfate 325 milliGRAM(s) Oral daily  gabapentin 400 milliGRAM(s) Oral at bedtime  glucagon  Injectable 1 milliGRAM(s) IntraMuscular once  HYDROmorphone  Injectable 0.5 milliGRAM(s) IV Push once  insulin glargine Injectable (LANTUS) 26 Unit(s) SubCutaneous at bedtime  insulin lispro (ADMELOG) corrective regimen sliding scale   SubCutaneous three times a day before meals  iron sucrose IVPB 200 milliGRAM(s) IV Intermittent every 24 hours  melatonin 3 milliGRAM(s) Oral at bedtime PRN  metoprolol succinate ER 25 milliGRAM(s) Oral at bedtime  ondansetron Injectable 4 milliGRAM(s) IV Push every 8 hours PRN  oxyCODONE    IR 5 milliGRAM(s) Oral every 6 hours PRN  ranolazine 500 milliGRAM(s) Oral two times a day  sodium bicarbonate 1300 milliGRAM(s) Oral three times a day  sodium chloride 0.45% 1000 milliLiter(s) IV Continuous <Continuous>  sodium chloride 2% . 1000 milliLiter(s) IV Continuous <Continuous>  sodium chloride 2% . 1000 milliLiter(s) IV Continuous <Continuous>  ticagrelor 90 milliGRAM(s) Oral two times a day  urea Oral Powder 15 Gram(s) Oral daily      LABS:                          8.0    9.87  )-----------( 385      ( 27 Mar 2025 06:35 )             23.6     03-27    129[L]  |  94[L]  |  84.6[H]  ----------------------------<  140[H]  4.7   |  21.0[L]  |  3.08[H]    Ca    7.0[L]      27 Mar 2025 06:35    TPro  5.6[L]  /  Alb  2.3[L]  /  TBili  0.7  /  DBili  x   /  AST  36[H]  /  ALT  23  /  AlkPhos  244[H]  03-27    LIVER FUNCTIONS - ( 27 Mar 2025 06:35 )  Alb: 2.3 g/dL / Pro: 5.6 g/dL / ALK PHOS: 244 U/L / ALT: 23 U/L / AST: 36 U/L / GGT: x                 Urinalysis Basic - ( 27 Mar 2025 06:35 )    Color: x / Appearance: x / SG: x / pH: x  Gluc: 140 mg/dL / Ketone: x  / Bili: x / Urobili: x   Blood: x / Protein: x / Nitrite: x   Leuk Esterase: x / RBC: x / WBC x   Sq Epi: x / Non Sq Epi: x / Bacteria: x      CAPILLARY BLOOD GLUCOSE      POCT Blood Glucose.: 140 mg/dL (27 Mar 2025 12:35)  POCT Blood Glucose.: 113 mg/dL (27 Mar 2025 09:33)  POCT Blood Glucose.: 220 mg/dL (26 Mar 2025 22:28)  POCT Blood Glucose.: 233 mg/dL (26 Mar 2025 17:31)      RADIOLOGY & ADDITIONAL TESTS:      Consultant notes reviewed  I independently reviwed all images/ labarotory results /cultures/ telemetry/EKG and my findings are indicated above  and discussed care plan with consultants/nursing/ family /patient

## 2025-03-27 NOTE — PROGRESS NOTE ADULT - ASSESSMENT
CKD(III): baseline Screat 1.3- 1.8  BRADY, ATN post cardiac cath on 03/22 PCI to RCA with ZEINAB and hypotensive event  Hypervolemic hyponatremia  MA  CT abd noted:  No hydronephrosis - Enlarged edematous L kidney w increased perinephric fat stranding- Likely Pyelonephritis   ESBL Bacteremia  - cont to avoid further potential nephrotoxins  - cont IV antibiotics  - cont NaHCO3  - cont PO fluid restriction and PO Urea   - Midodrine  - trend labs    Anemia: +CKD + low Fe  - cont IV Fe  - added RONDA    RO: secondary HPT  - cont Rocaltrol  - added CaCO3

## 2025-03-27 NOTE — PROGRESS NOTE ADULT - ASSESSMENT
Gen Cards: Dr. Allen  HF: Dr. Polanco    75 y/o female with PMH of ICM/HFrEF (LVEF 35-40%), CAD s/p PCI, Mobitz II s/p PPM (MD NereydaT), HTN, HLD, PAD with chronic right LE wound, DM2, and CKD3, who presented to the ED on 3/19 with c/o CP, found to have NSTEMI.     She is s/p LHC on 3/21 which showed mid-distal LAD severe stenosis, OM1 and OM2 severe stenosis, mid ISR 50%, distal ISR 99%, s/p PCI with 1 ZEINAB. Her PAD on her CardioMEMS was 22 on 3/21 (goal 14) so her diuretics were resumed with Lasix 40 mg IV BID. On 3/22 she had a fever with leukocytosis and her blood cultures are positive for E. Coli/ESBL. Her CT A/P showed left pyelonephritis. She also has an BRADY for which her diuretics and Entresto were held. Gen Cards: Dr. Allen  HF: Dr. Polanco    75 y/o female with PMH of ICM/HFrEF (LVEF 35-40%), CAD s/p PCI, Mobitz II s/p PPM (MD NereydaT), HTN, HLD, PAD with chronic right LE wound, DM2, and CKD3, who presented to the ED on 3/19 with c/o CP, found to have NSTEMI.     She is s/p LHC on 3/21 which showed mid-distal LAD severe stenosis, OM1 and OM2 severe stenosis, mid ISR 50%, distal ISR 99%, s/p PCI with 1 ZEINAB. Her PAD on her CardioMEMS was 22 on 3/21 (goal 14) so her diuretics were resumed with Lasix 40 mg IV BID. On 3/22 she had a fever with leukocytosis and her blood cultures are positive for E. Coli/ESBL. Her CT A/P showed left pyelonephritis. She also has an BRADY for which her diuretics and Entresto were held.    MEMS PAD (goal 14):  3/27/25: 26

## 2025-03-28 DIAGNOSIS — I25.5 ISCHEMIC CARDIOMYOPATHY: ICD-10-CM

## 2025-03-28 DIAGNOSIS — N39.0 URINARY TRACT INFECTION, SITE NOT SPECIFIED: ICD-10-CM

## 2025-03-28 DIAGNOSIS — E11.40 TYPE 2 DIABETES MELLITUS WITH DIABETIC NEUROPATHY, UNSPECIFIED: ICD-10-CM

## 2025-03-28 LAB
ALBUMIN SERPL ELPH-MCNC: 2 G/DL — LOW (ref 3.3–5.2)
ALP SERPL-CCNC: 216 U/L — HIGH (ref 40–120)
ALT FLD-CCNC: 17 U/L — SIGNIFICANT CHANGE UP
ANION GAP SERPL CALC-SCNC: 15 MMOL/L — SIGNIFICANT CHANGE UP (ref 5–17)
AST SERPL-CCNC: 34 U/L — HIGH
BILIRUB SERPL-MCNC: 0.8 MG/DL — SIGNIFICANT CHANGE UP (ref 0.4–2)
BUN SERPL-MCNC: 84.9 MG/DL — HIGH (ref 8–20)
CALCIUM SERPL-MCNC: 7.6 MG/DL — LOW (ref 8.4–10.5)
CHLORIDE SERPL-SCNC: 95 MMOL/L — LOW (ref 96–108)
CO2 SERPL-SCNC: 19 MMOL/L — LOW (ref 22–29)
CREAT SERPL-MCNC: 2.74 MG/DL — HIGH (ref 0.5–1.3)
EGFR: 18 ML/MIN/1.73M2 — LOW
EGFR: 18 ML/MIN/1.73M2 — LOW
GLUCOSE BLDC GLUCOMTR-MCNC: 144 MG/DL — HIGH (ref 70–99)
GLUCOSE BLDC GLUCOMTR-MCNC: 157 MG/DL — HIGH (ref 70–99)
GLUCOSE BLDC GLUCOMTR-MCNC: 184 MG/DL — HIGH (ref 70–99)
GLUCOSE BLDC GLUCOMTR-MCNC: 205 MG/DL — HIGH (ref 70–99)
GLUCOSE SERPL-MCNC: 149 MG/DL — HIGH (ref 70–99)
GRAM STN FLD: ABNORMAL
HCT VFR BLD CALC: 28.9 % — LOW (ref 34.5–45)
HGB BLD-MCNC: 9.9 G/DL — LOW (ref 11.5–15.5)
MCHC RBC-ENTMCNC: 28.4 PG — SIGNIFICANT CHANGE UP (ref 27–34)
MCHC RBC-ENTMCNC: 34.3 G/DL — SIGNIFICANT CHANGE UP (ref 32–36)
MCV RBC AUTO: 83 FL — SIGNIFICANT CHANGE UP (ref 80–100)
NRBC # BLD AUTO: 0 K/UL — SIGNIFICANT CHANGE UP (ref 0–0)
NRBC # FLD: 0 K/UL — SIGNIFICANT CHANGE UP (ref 0–0)
NRBC BLD AUTO-RTO: 0 /100 WBCS — SIGNIFICANT CHANGE UP (ref 0–0)
PLATELET # BLD AUTO: 426 K/UL — HIGH (ref 150–400)
PMV BLD: 9.6 FL — SIGNIFICANT CHANGE UP (ref 7–13)
POTASSIUM SERPL-MCNC: 4.9 MMOL/L — SIGNIFICANT CHANGE UP (ref 3.5–5.3)
POTASSIUM SERPL-SCNC: 4.9 MMOL/L — SIGNIFICANT CHANGE UP (ref 3.5–5.3)
PROT SERPL-MCNC: 5.8 G/DL — LOW (ref 6.6–8.7)
RBC # BLD: 3.48 M/UL — LOW (ref 3.8–5.2)
RBC # FLD: 15.7 % — HIGH (ref 10.3–14.5)
SODIUM SERPL-SCNC: 129 MMOL/L — LOW (ref 135–145)
WBC # BLD: 10.63 K/UL — HIGH (ref 3.8–10.5)
WBC # FLD AUTO: 10.63 K/UL — HIGH (ref 3.8–10.5)

## 2025-03-28 PROCEDURE — 99232 SBSQ HOSP IP/OBS MODERATE 35: CPT

## 2025-03-28 PROCEDURE — G0545: CPT

## 2025-03-28 PROCEDURE — 99233 SBSQ HOSP IP/OBS HIGH 50: CPT

## 2025-03-28 PROCEDURE — 74176 CT ABD & PELVIS W/O CONTRAST: CPT | Mod: 26

## 2025-03-28 RX ORDER — FUROSEMIDE 10 MG/ML
80 INJECTION INTRAMUSCULAR; INTRAVENOUS ONCE
Refills: 0 | Status: COMPLETED | OUTPATIENT
Start: 2025-03-28 | End: 2025-03-28

## 2025-03-28 RX ORDER — TORSEMIDE 10 MG
40 TABLET ORAL DAILY
Refills: 0 | Status: DISCONTINUED | OUTPATIENT
Start: 2025-03-29 | End: 2025-03-31

## 2025-03-28 RX ORDER — METOPROLOL SUCCINATE 50 MG/1
50 TABLET, EXTENDED RELEASE ORAL AT BEDTIME
Refills: 0 | Status: DISCONTINUED | OUTPATIENT
Start: 2025-03-28 | End: 2025-03-31

## 2025-03-28 RX ADMIN — IPRATROPIUM BROMIDE AND ALBUTEROL SULFATE 3 MILLILITER(S): .5; 2.5 SOLUTION RESPIRATORY (INHALATION) at 09:38

## 2025-03-28 RX ADMIN — METOPROLOL SUCCINATE 50 MILLIGRAM(S): 50 TABLET, EXTENDED RELEASE ORAL at 21:16

## 2025-03-28 RX ADMIN — CALCIUM CARBONATE 1 TABLET(S): 750 TABLET ORAL at 00:05

## 2025-03-28 RX ADMIN — ERTAPENEM SODIUM 100 MILLIGRAM(S): 1 INJECTION, POWDER, LYOPHILIZED, FOR SOLUTION INTRAMUSCULAR; INTRAVENOUS at 11:27

## 2025-03-28 RX ADMIN — Medication 15 GRAM(S): at 11:27

## 2025-03-28 RX ADMIN — Medication 1300 MILLIGRAM(S): at 21:15

## 2025-03-28 RX ADMIN — RANOLAZINE 500 MILLIGRAM(S): 1000 TABLET, FILM COATED, EXTENDED RELEASE ORAL at 17:03

## 2025-03-28 RX ADMIN — INSULIN GLARGINE-YFGN 26 UNIT(S): 100 INJECTION, SOLUTION SUBCUTANEOUS at 21:40

## 2025-03-28 RX ADMIN — Medication 325 MILLIGRAM(S): at 11:28

## 2025-03-28 RX ADMIN — RANOLAZINE 500 MILLIGRAM(S): 1000 TABLET, FILM COATED, EXTENDED RELEASE ORAL at 06:02

## 2025-03-28 RX ADMIN — FUROSEMIDE 80 MILLIGRAM(S): 10 INJECTION INTRAMUSCULAR; INTRAVENOUS at 06:02

## 2025-03-28 RX ADMIN — IPRATROPIUM BROMIDE AND ALBUTEROL SULFATE 3 MILLILITER(S): .5; 2.5 SOLUTION RESPIRATORY (INHALATION) at 21:21

## 2025-03-28 RX ADMIN — Medication 10 MILLIGRAM(S): at 13:34

## 2025-03-28 RX ADMIN — TICAGRELOR 90 MILLIGRAM(S): 90 TABLET ORAL at 06:02

## 2025-03-28 RX ADMIN — INSULIN LISPRO 2: 100 INJECTION, SOLUTION INTRAVENOUS; SUBCUTANEOUS at 17:04

## 2025-03-28 RX ADMIN — CALCIUM CARBONATE 1 TABLET(S): 750 TABLET ORAL at 21:40

## 2025-03-28 RX ADMIN — Medication 1300 MILLIGRAM(S): at 13:34

## 2025-03-28 RX ADMIN — INSULIN LISPRO 4: 100 INJECTION, SOLUTION INTRAVENOUS; SUBCUTANEOUS at 08:38

## 2025-03-28 RX ADMIN — CALCIUM CARBONATE 1 TABLET(S): 750 TABLET ORAL at 08:35

## 2025-03-28 RX ADMIN — TICAGRELOR 90 MILLIGRAM(S): 90 TABLET ORAL at 17:03

## 2025-03-28 RX ADMIN — CALCIUM CARBONATE 1 TABLET(S): 750 TABLET ORAL at 16:34

## 2025-03-28 RX ADMIN — Medication 1300 MILLIGRAM(S): at 06:01

## 2025-03-28 RX ADMIN — ATORVASTATIN CALCIUM 80 MILLIGRAM(S): 80 TABLET, FILM COATED ORAL at 21:16

## 2025-03-28 RX ADMIN — Medication 81 MILLIGRAM(S): at 11:28

## 2025-03-28 RX ADMIN — Medication 1 APPLICATION(S): at 11:28

## 2025-03-28 RX ADMIN — IPRATROPIUM BROMIDE AND ALBUTEROL SULFATE 3 MILLILITER(S): .5; 2.5 SOLUTION RESPIRATORY (INHALATION) at 13:57

## 2025-03-28 RX ADMIN — IRON SUCROSE 110 MILLIGRAM(S): 20 INJECTION, SOLUTION INTRAVENOUS at 17:03

## 2025-03-28 RX ADMIN — Medication 10 MILLIGRAM(S): at 21:16

## 2025-03-28 RX ADMIN — FUROSEMIDE 80 MILLIGRAM(S): 10 INJECTION INTRAMUSCULAR; INTRAVENOUS at 16:34

## 2025-03-28 RX ADMIN — GABAPENTIN 400 MILLIGRAM(S): 400 CAPSULE ORAL at 21:15

## 2025-03-28 RX ADMIN — CALCITRIOL 0.25 MICROGRAM(S): 0.5 CAPSULE, GELATIN COATED ORAL at 11:27

## 2025-03-28 RX ADMIN — Medication 40 MILLIGRAM(S): at 06:02

## 2025-03-28 NOTE — PROGRESS NOTE ADULT - NS ATTEND AMEND GEN_ALL_CORE FT
Ms. Ko is a 75 y/o Divehi speaking female with PMH of ICM/HFrEF (LVEF 35-40%), CAD s/p PCI, Mobitz II s/p PPM (MIRNA Dawn), HTN, HLD, PAD with chronic right LE wound, DM2, and CKD3, who presented to the ED on 3/19 with c/o CP, found to have NSTEMI. She underwent PCI to her RCA for ISR on Friday 3/21/25. Over the next couple of days she developed fever, leukocytosis and worsening renal function. Her blood cultures are positive for ESBL E.coli and CT abdomen is concerning for left pyelonephritis.     Increase Toprol to 50 mg daily.   Start hydralazine 10 mg TID.   Responded well to lasix 80 mg IV BID.   We will interrogate cardiomems today and redose diuretics based on PAD on cardiomems.   Holding home dose of entresto and aldactone in the setting of renal dysfunction.   Creatinine improving with diuretics and with antibiotics for pyelonephritis.   Continue DAPT and Ranexa for recent PCI.   Management of ESBL E.coli sepsis per primary team.

## 2025-03-28 NOTE — PROGRESS NOTE ADULT - ASSESSMENT
74 year old female with Hypertension, Diabetes, Coronary artery disease s/p PCI, Heart failure with reduced EF, Mobitz Type II s/p Micra PPM, CKD3, Anemia, Stroke and PAD presented with dyspnea and admitted for NSTEMI. She was heparinized and subsequently underwent PCI to RCA ISR. Hospital stay significant for  ESBL E. coli Pyelonephritis and bacteremia, BRADY and multiple electrolyte abnormalities, Encephalopathy. Temporary requirement for midodrine. Bacteremia cleared (3/25/25). BRADY improving.    # ESBL E. coli Sepsis  # ESBL E. coli Bacteremia  # ESBL E. coli Pyelonephritis  Cleared as of 3/25/25  Still febrile. No longer on Midodrine  - IV antibiotics (Ertapenem) through 3/30/25  - Repeat imaging given persistent fever  - ID follow up    # NSTEMI  # CAD s/p PCI  - monitor  - DAPT, statin, BB  - Ranolazine  - Cardiology follow up    # Ischemic Cardiomyopathy   # Acute on chronic systolic heart failure  EF 25-50%  - Strict intake, output; Fluid and salt restriction  - Furosemide 80mg IV  - Gradually add GDMT as BP and renal function allow    # BRADY on CKD3  # Hyponatremia  Improving.  - Avoid further potential nephrotoxins  - NaHCO3  - UreNa  - PO fluid restriction and PO Urea per renal  - monitor renal function, na    # Type 2 Diabetes  # Neuropathy  A1c 8.5  Mild hyperglycemia  - Glargine 26U  - Add premeal once stable oral intake established  - Blood glucose monitoring with sliding scale Lispro  - Gabapentin    # Anemia (multifactorial - chronic disease, inflammation)  - RONDA, IV Iron  - monitor Hgb    VTE Prophylaxis - Intermittent pneumatic compression  Incentive Spirometry  Mobilize OOB with PT    Disposition - pending clinical improvement, defervescence. Anticipate Rehab upon discharge likely

## 2025-03-28 NOTE — PROGRESS NOTE ADULT - PROBLEM SELECTOR PLAN 3
- Likely due to SALO, nephrology following  - BUN rising but Cr slowly improving. No current s/s of bleeding, although H/H downtrending as of yesterday (no CBC sent today, STAT CBC ordered).  - Monitor renal function closely with diuresis  - Avoid nephrotoxins.
- Likely due to SALO, nephrology following  - BUN/Cr slowly improving  - Monitor renal function closely with diuresis  - Avoid nephrotoxins
- Hold further diuretics today.  - Likely due to SALO, nephrology following  - Avoid nephrotoxins.
- Likely due to SALO, nephrology following  - BUN/Cr slowly improving  - Monitor renal function closely with diuresis  - Avoid nephrotoxins

## 2025-03-28 NOTE — PROGRESS NOTE ADULT - ASSESSMENT
74 F PMH HTN, hyperlipidemia, CAD, CHF s/p CardioMEMS, Mobitz II s/p pacemaker, PAD, CVA, DM2 (A1c 8.5), CKD3, who presented here with chest pressure.     Acute on chronic CHF exacerbation  NSTEMI s/p cardiac cath with ZEINAB to RCA on March 22nd  Course complicated by  Fevers  Leukocytosis  L sided flank pain  Abnormal UA with pyuria and bacteriuria   ESBL E Coli bacteremia   Abnormal CT, reviewed by myself, with evidence of L sided pyelonephritis (enlarged kidney with perinephric stranding)   Severe sepsis   Hospital course further complicated by acute renal failure and hyponatremia and recurrent fevers     Suggest;  Continue Ertapenem 500mg IV Q24h   Urine culture with >100k ESBL Coli  Blood cultures from 3/22 and 3/24 with ESBL E Coli bacteremia   Repeat blood cultures from 3/25 and 3/26 are negative to date   Monitor fever curve and trend WBC count   Monitor kidney function daily while on IV antibiotics   Management of BRADY on CKD and hyponatremia as per Primary Team / Nephrology     Unclear why pt is having fevers now - could be from residual pyelonephritis - would check Doppler b/l LE and repeat CT A/P    Case discussed with Dr David.  74 F PMH HTN, hyperlipidemia, CAD, CHF s/p CardioMEMS, Mobitz II s/p pacemaker, PAD, CVA, DM2 (A1c 8.5), CKD3, who presented here with chest pressure.     Acute on chronic CHF exacerbation  NSTEMI s/p cardiac cath with ZEINAB to RCA on March 22nd  Course complicated by  Fevers  Leukocytosis  L sided flank pain  Abnormal UA with pyuria and bacteriuria   ESBL E Coli bacteremia   Abnormal CT, reviewed by myself, with evidence of L sided pyelonephritis (enlarged kidney with perinephric stranding)   Severe sepsis   Hospital course further complicated by acute renal failure and hyponatremia and recurrent fevers     Suggest;  Continue Ertapenem 500mg IV Q24h   Urine culture with >100k ESBL E Coli  Blood cultures from 3/22 and 3/24 with ESBL E Coli bacteremia   Repeat blood cultures from 3/25 and 3/26 are negative to date   Monitor fever curve and trend WBC count   Monitor kidney function daily while on IV antibiotics   Management of BRADY on CKD and hyponatremia as per Primary Team / Nephrology     Unclear why pt is having fevers now - would check Doppler b/l LE and repeat CT A/P    Case discussed with Dr David.

## 2025-03-28 NOTE — PROGRESS NOTE ADULT - NS ATTEND OPT1 GEN_ALL_CORE

## 2025-03-28 NOTE — PROGRESS NOTE ADULT - PROBLEM SELECTOR PLAN 1
- Etiology: ICM, LVEF 35-40%.  - Clinically mildly hypervolemic w/ warm extremities. MEMS remains elevated.  - Diuretics: Will continue Lasix 80 mg IVP BID tonight, and tomorrow switch to torsemide 40 mg daily.  - GDMT: Held for BRADY/hypotension. Midodrine stopped 3/26. BP improving. Start hydralazine 10 mg TID. Increase Toprol-XL to 50 mg at bedtime (home medications are Toprol  mg daily, Entresto  mg BID, and Farxiga 10 mg daily. No MRA 2/2 hyperkalemia). Hold Farxiga 2/2 pyelonephritis.   - Please document strict I&Os and daily standing weight.  - Will continue to monitor her CardioMEMS readings.  - Device: s/p Micra PPM with chronic .

## 2025-03-28 NOTE — DIETITIAN INITIAL EVALUATION ADULT - OTHER INFO
Per chart "74 year old female with Hypertension, Diabetes, Coronary artery disease s/p PCI, Heart failure with reduced EF, Mobitz Type II s/p Micra PPM, CKD3, Anemia, Stroke and PAD presented with dyspnea and admitted for NSTEMI. She was heparinized and subsequently underwent PCI to RCA ISR. Hospital stay significant for  ESBL E. coli Pyelonephritis and bacteremia, BRADY and multiple electrolyte abnormalities, Encephalopathy. Temporary requirement for midodrine. Bacteremia cleared (3/25/25). BRADY improving."

## 2025-03-28 NOTE — PROGRESS NOTE ADULT - SUBJECTIVE AND OBJECTIVE BOX
HOSPITALIST PROGRESS NOTE    SHIREEN CASH  [id number]  74yFemale    Patient is a 74y old  Female who presents with a chief complaint of     SUBJECTIVE:   Chart reviewed since admission.    74 year old female with Hypertension, Diabetes, Coronary artery disease s/p PCI, Heart failure with reduced EF, Mobitz Type II s/p Micra PPM, CKD3, Anemia, Stroke and PAD presented with dyspnea and admitted for NSTEMI. She was heparinized and subsequently underwent PCI to RCA ISR. Hospital stay significant for  ESBL E. coli Pyelonephritis and bacteremia, BRADY and multiple electrolyte abnormalities, Encephalopathy. Temporary requirement for midodrine.  Bacteremia cleared (3/25/25)      Patient seen and examined at bedside.      OBJECTIVE:  Vital Signs Last 24 Hrs  T(C): 36.8 (28 Mar 2025 07:45), Max: 38.2 (27 Mar 2025 20:46)  T(F): 98.3 (28 Mar 2025 07:45), Max: 100.7 (27 Mar 2025 20:46)  HR: 64 (28 Mar 2025 07:45) (59 - 81)  BP: 123/54 (28 Mar 2025 07:45) (104/55 - 149/67)  BP(mean): 76 (27 Mar 2025 12:00) (70 - 76)  RR: 17 (28 Mar 2025 07:45) (17 - 18)  SpO2: 97% (28 Mar 2025 07:45) (94% - 99%)    Parameters below as of 28 Mar 2025 07:45  Patient On (Oxygen Delivery Method): room air        PHYSICAL EXAMINATION  General:   HEENT:    NECK:    CVS:   RESP:    GI:    :   MSK:    CNS:    INTEG:    PSYCH:      MONITOR:  CAPILLARY BLOOD GLUCOSE      POCT Blood Glucose.: 245 mg/dL (27 Mar 2025 21:03)  POCT Blood Glucose.: 199 mg/dL (27 Mar 2025 17:02)  POCT Blood Glucose.: 140 mg/dL (27 Mar 2025 12:35)  POCT Blood Glucose.: 113 mg/dL (27 Mar 2025 09:33)        I&O's Summary    27 Mar 2025 07:01  -  28 Mar 2025 07:00  --------------------------------------------------------  IN: 1260 mL / OUT: 2425 mL / NET: -1165 mL                            9.9    10.63 )-----------( 426      ( 28 Mar 2025 06:35 )             28.9       03-28    129[L]  |  95[L]  |  84.9[H]  ----------------------------<  149[H]  4.9   |  19.0[L]  |  2.74[H]    Ca    7.6[L]      28 Mar 2025 06:35    TPro  5.8[L]  /  Alb  2.0[L]  /  TBili  0.8  /  DBili  x   /  AST  34[H]  /  ALT  17  /  AlkPhos  216[H]  03-28        Urinalysis Basic - ( 28 Mar 2025 06:35 )    Color: x / Appearance: x / SG: x / pH: x  Gluc: 149 mg/dL / Ketone: x  / Bili: x / Urobili: x   Blood: x / Protein: x / Nitrite: x   Leuk Esterase: x / RBC: x / WBC x   Sq Epi: x / Non Sq Epi: x / Bacteria: x            TTE:    RADIOLOGY        MEDICATIONS  (STANDING):  albuterol/ipratropium for Nebulization 3 milliLiter(s) Nebulizer every 6 hours  aspirin enteric coated 81 milliGRAM(s) Oral daily  atorvastatin 80 milliGRAM(s) Oral at bedtime  calcitriol   Capsule 0.25 MICROGram(s) Oral daily  calcium carbonate   1250 mG (OsCal) 1 Tablet(s) Oral three times a day  chlorhexidine 2% Cloths 1 Application(s) Topical daily  dextrose 5%. 1000 milliLiter(s) (50 mL/Hr) IV Continuous <Continuous>  dextrose 5%. 1000 milliLiter(s) (100 mL/Hr) IV Continuous <Continuous>  dextrose 50% Injectable 25 Gram(s) IV Push once  dextrose 50% Injectable 12.5 Gram(s) IV Push once  dextrose 50% Injectable 25 Gram(s) IV Push once  epoetin kristina-epbx (RETACRIT) Injectable 00966 Unit(s) SubCutaneous <User Schedule>  ertapenem  IVPB 500 milliGRAM(s) IV Intermittent every 24 hours  ferrous    sulfate 325 milliGRAM(s) Oral daily  furosemide   Injectable 80 milliGRAM(s) IV Push two times a day  gabapentin 400 milliGRAM(s) Oral at bedtime  glucagon  Injectable 1 milliGRAM(s) IntraMuscular once  HYDROmorphone  Injectable 0.5 milliGRAM(s) IV Push once  insulin glargine Injectable (LANTUS) 26 Unit(s) SubCutaneous at bedtime  insulin lispro (ADMELOG) corrective regimen sliding scale   SubCutaneous three times a day before meals  iron sucrose IVPB 200 milliGRAM(s) IV Intermittent every 24 hours  metoprolol succinate ER 25 milliGRAM(s) Oral at bedtime  pantoprazole    Tablet 40 milliGRAM(s) Oral before breakfast  ranolazine 500 milliGRAM(s) Oral two times a day  sodium bicarbonate 1300 milliGRAM(s) Oral three times a day  sodium chloride 0.45% 1000 milliLiter(s) (60 mL/Hr) IV Continuous <Continuous>  sodium chloride 2% . 1000 milliLiter(s) (40 mL/Hr) IV Continuous <Continuous>  sodium chloride 2% . 1000 milliLiter(s) (30 mL/Hr) IV Continuous <Continuous>  ticagrelor 90 milliGRAM(s) Oral two times a day  urea Oral Powder 15 Gram(s) Oral daily      MEDICATIONS  (PRN):  acetaminophen     Tablet .. 650 milliGRAM(s) Oral every 6 hours PRN Temp greater or equal to 38C (100.4F), Mild Pain (1 - 3)  aluminum hydroxide/magnesium hydroxide/simethicone Suspension 30 milliLiter(s) Oral every 4 hours PRN Dyspepsia  dextrose Oral Gel 15 Gram(s) Oral once PRN Blood Glucose LESS THAN 70 milliGRAM(s)/deciliter  melatonin 3 milliGRAM(s) Oral at bedtime PRN Insomnia  ondansetron Injectable 4 milliGRAM(s) IV Push every 8 hours PRN Nausea and/or Vomiting  oxyCODONE    IR 5 milliGRAM(s) Oral every 6 hours PRN moderate to severe pain     HOSPITALIST PROGRESS NOTE    SHIREEN CASH  [id number]  74yFemale    Patient is a 74y old  Female who presents with a chief complaint of     SUBJECTIVE:   Chart reviewed since admission.    74 year old female with Hypertension, Diabetes, Coronary artery disease s/p PCI, Heart failure with reduced EF, Mobitz Type II s/p Micra PPM, CKD3, Anemia, Stroke and PAD presented with dyspnea and admitted for NSTEMI. She was heparinized and subsequently underwent PCI to RCA ISR. Hospital stay significant for  ESBL E. coli Pyelonephritis and bacteremia, BRADY and multiple electrolyte abnormalities, Encephalopathy. Temporary requirement for midodrine. Bacteremia cleared (3/25/25). BRDAY improving.      Patient seen and examined at bedside for NSTEMI, ESBL bacteremia/Pyelonephritis, BRADY, Hyponatremia      OBJECTIVE:  Vital Signs Last 24 Hrs  T(C): 36.8 (28 Mar 2025 07:45), Max: 38.2 (27 Mar 2025 20:46)  T(F): 98.3 (28 Mar 2025 07:45), Max: 100.7 (27 Mar 2025 20:46)  HR: 64 (28 Mar 2025 07:45) (59 - 81)  BP: 123/54 (28 Mar 2025 07:45) (104/55 - 149/67)  BP(mean): 76 (27 Mar 2025 12:00) (70 - 76)  RR: 17 (28 Mar 2025 07:45) (17 - 18)  SpO2: 97% (28 Mar 2025 07:45) (94% - 99%)    Parameters below as of 28 Mar 2025 07:45  Patient On (Oxygen Delivery Method): room air        PHYSICAL EXAMINATION  General:   HEENT:    NECK:    CVS:   RESP:    GI:    :   MSK:    CNS:    INTEG:    PSYCH:      MONITOR:  CAPILLARY BLOOD GLUCOSE      POCT Blood Glucose.: 245 mg/dL (27 Mar 2025 21:03)  POCT Blood Glucose.: 199 mg/dL (27 Mar 2025 17:02)  POCT Blood Glucose.: 140 mg/dL (27 Mar 2025 12:35)  POCT Blood Glucose.: 113 mg/dL (27 Mar 2025 09:33)      A1C with Estimated Average Glucose Result: 8.5 % (03.22.25 @ 04:45)   I&O's Summary    27 Mar 2025 07:01  -  28 Mar 2025 07:00  --------------------------------------------------------  IN: 1260 mL / OUT: 2425 mL / NET: -1165 mL                            9.9    10.63 )-----------( 426      ( 28 Mar 2025 06:35 )             28.9       03-28    129[L]  |  95[L]  |  84.9[H]  ----------------------------<  149[H]  4.9   |  19.0[L]  |  2.74[H]    Ca    7.6[L]      28 Mar 2025 06:35    TPro  5.8[L]  /  Alb  2.0[L]  /  TBili  0.8  /  DBili  x   /  AST  34[H]  /  ALT  17  /  AlkPhos  216[H]  03-28        Urinalysis Basic - ( 28 Mar 2025 06:35 )    Color: x / Appearance: x / SG: x / pH: x  Gluc: 149 mg/dL / Ketone: x  / Bili: x / Urobili: x   Blood: x / Protein: x / Nitrite: x   Leuk Esterase: x / RBC: x / WBC x   Sq Epi: x / Non Sq Epi: x / Bacteria: x            TTE:    < from: TTE W or WO Ultrasound Enhancing Agent (03.21.25 @ 17:56) >  CONCLUSIONS:      1. Technically difficult image quality.   2. Left ventricular cavity is normal in size. Left ventricular systolic function is moderately decreased with an ejection fraction of 31 % by Taylor's method of disks with an ejection fraction visually estimated at 25 to 30 %. Regional wall motion abnormalities present.   3. Multiple segmental abnormalities exist. See findings.   4. There is moderate (grade 2) left ventricular diastolic dysfunction, with elevated left ventricular filling pressure.   5. There is no evidence of a left ventricular thrombus.   6. Normal right ventricular cavity size and normal right ventricular systolic function.   7. Left atrium is severely dilated.   8. Moderate mitral regurgitation.   9. Mild tricuspid regurgitation.  10. Estimated pulmonary artery systolic pressure is 58 mmHg.  11. The inferior vena cava is normal in size measuring 1.10 cm in diameter, (normal <2.1cm) with abnormal inspiratory collapse (abnormal <50%) consistent with mildly elevated right atrial pressure (~8, range 5-10mmHg).  12. No pericardial effusion seen.  13. Compared to the transthoracic echocardiogram performed on 10/8/2024, further reduction in LVEF seen and PASP has increased.    < end of copied text >    RADIOLOGY    < from: CT Abdomen and Pelvis No Cont (03.22.25 @ 13:45) >  IMPRESSION:  *  No evidence of retroperitoneal hemorrhage or ileus.  *  Nondependent locules of air in the bladder and left ureter without   perivesicular fat stranding, nonspecific, could be secondary to recent   instrumentation versus cystitis. Recommend clinical correlation.  *  Enlarged, edematous left kidney with increased perinephric fat   stranding and air in the left renal collecting system can be seen in the   setting of pyelonephritis/ureteritis. Evaluation is limited due to   noncontrast technique. Correlation with laboratory values suggested.    < end of copied text >      MEDICATIONS  (STANDING):  albuterol/ipratropium for Nebulization 3 milliLiter(s) Nebulizer every 6 hours  aspirin enteric coated 81 milliGRAM(s) Oral daily  atorvastatin 80 milliGRAM(s) Oral at bedtime  calcitriol   Capsule 0.25 MICROGram(s) Oral daily  calcium carbonate   1250 mG (OsCal) 1 Tablet(s) Oral three times a day  chlorhexidine 2% Cloths 1 Application(s) Topical daily  dextrose 5%. 1000 milliLiter(s) (50 mL/Hr) IV Continuous <Continuous>  dextrose 5%. 1000 milliLiter(s) (100 mL/Hr) IV Continuous <Continuous>  dextrose 50% Injectable 25 Gram(s) IV Push once  dextrose 50% Injectable 12.5 Gram(s) IV Push once  dextrose 50% Injectable 25 Gram(s) IV Push once  epoetin kristina-epbx (RETACRIT) Injectable 16161 Unit(s) SubCutaneous <User Schedule>  ertapenem  IVPB 500 milliGRAM(s) IV Intermittent every 24 hours  ferrous    sulfate 325 milliGRAM(s) Oral daily  furosemide   Injectable 80 milliGRAM(s) IV Push two times a day  gabapentin 400 milliGRAM(s) Oral at bedtime  glucagon  Injectable 1 milliGRAM(s) IntraMuscular once  HYDROmorphone  Injectable 0.5 milliGRAM(s) IV Push once  insulin glargine Injectable (LANTUS) 26 Unit(s) SubCutaneous at bedtime  insulin lispro (ADMELOG) corrective regimen sliding scale   SubCutaneous three times a day before meals  iron sucrose IVPB 200 milliGRAM(s) IV Intermittent every 24 hours  metoprolol succinate ER 25 milliGRAM(s) Oral at bedtime  pantoprazole    Tablet 40 milliGRAM(s) Oral before breakfast  ranolazine 500 milliGRAM(s) Oral two times a day  sodium bicarbonate 1300 milliGRAM(s) Oral three times a day  sodium chloride 0.45% 1000 milliLiter(s) (60 mL/Hr) IV Continuous <Continuous>  sodium chloride 2% . 1000 milliLiter(s) (40 mL/Hr) IV Continuous <Continuous>  sodium chloride 2% . 1000 milliLiter(s) (30 mL/Hr) IV Continuous <Continuous>  ticagrelor 90 milliGRAM(s) Oral two times a day  urea Oral Powder 15 Gram(s) Oral daily      MEDICATIONS  (PRN):  acetaminophen     Tablet .. 650 milliGRAM(s) Oral every 6 hours PRN Temp greater or equal to 38C (100.4F), Mild Pain (1 - 3)  aluminum hydroxide/magnesium hydroxide/simethicone Suspension 30 milliLiter(s) Oral every 4 hours PRN Dyspepsia  dextrose Oral Gel 15 Gram(s) Oral once PRN Blood Glucose LESS THAN 70 milliGRAM(s)/deciliter  melatonin 3 milliGRAM(s) Oral at bedtime PRN Insomnia  ondansetron Injectable 4 milliGRAM(s) IV Push every 8 hours PRN Nausea and/or Vomiting  oxyCODONE    IR 5 milliGRAM(s) Oral every 6 hours PRN moderate to severe pain     HOSPITALIST PROGRESS NOTE    SHIREEN CASH  [id number]  74yFemale    Patient is a 74y old  Female who presents with a chief complaint of     SUBJECTIVE:   Chart reviewed since admission.    74 year old female with Hypertension, Diabetes, Coronary artery disease s/p PCI, Heart failure with reduced EF, Mobitz Type II s/p Micra PPM, CKD3, Anemia, Stroke and PAD presented with dyspnea and admitted for NSTEMI. She was heparinized and subsequently underwent PCI to RCA ISR. Hospital stay significant for  ESBL E. coli Pyelonephritis and bacteremia, BRADY and multiple electrolyte abnormalities, Encephalopathy. Temporary requirement for midodrine. Bacteremia cleared (3/25/25). BRADY improving.      Patient seen and examined at bedside for NSTEMI, ESBL bacteremia/Pyelonephritis, BRADY, Hyponatremia.  Feels better.  Occasional fever but denies chills  Denies any dyspnea, nausea, vomiting, abdominal pain, diarrhea      OBJECTIVE:  Vital Signs Last 24 Hrs  T(C): 36.8 (28 Mar 2025 07:45), Max: 38.2 (27 Mar 2025 20:46)  T(F): 98.3 (28 Mar 2025 07:45), Max: 100.7 (27 Mar 2025 20:46)  HR: 64 (28 Mar 2025 07:45) (59 - 81)  BP: 123/54 (28 Mar 2025 07:45) (104/55 - 149/67)  BP(mean): 76 (27 Mar 2025 12:00) (70 - 76)  RR: 17 (28 Mar 2025 07:45) (17 - 18)  SpO2: 97% (28 Mar 2025 07:45) (94% - 99%)    Parameters below as of 28 Mar 2025 07:45  Patient On (Oxygen Delivery Method): room air        PHYSICAL EXAMINATION  BMI 31.6  General: Obese female lying in bed, comfortable   HEENT:  extraocular movements intact   NECK:  Supple  CVS: regular rate and rhythm S1 S2  RESP:  Fair air entry  GI:  Soft nondistended. Right sided tenderness BS+  : Right flank tenderness, [place]  MSK:  Weak lower extremities  CNS:  Awake, oriented. Unable to raise legs off bed  INTEG:  warm skin  PSYCH:  Fair mood    MONITOR:  CAPILLARY BLOOD GLUCOSE      POCT Blood Glucose.: 245 mg/dL (27 Mar 2025 21:03)  POCT Blood Glucose.: 199 mg/dL (27 Mar 2025 17:02)  POCT Blood Glucose.: 140 mg/dL (27 Mar 2025 12:35)  POCT Blood Glucose.: 113 mg/dL (27 Mar 2025 09:33)      A1C with Estimated Average Glucose Result: 8.5 % (03.22.25 @ 04:45)   I&O's Summary    27 Mar 2025 07:01  -  28 Mar 2025 07:00  --------------------------------------------------------  IN: 1260 mL / OUT: 2425 mL / NET: -1165 mL                            9.9    10.63 )-----------( 426      ( 28 Mar 2025 06:35 )             28.9       03-28    129[L]  |  95[L]  |  84.9[H]  ----------------------------<  149[H]  4.9   |  19.0[L]  |  2.74[H]    Ca    7.6[L]      28 Mar 2025 06:35    TPro  5.8[L]  /  Alb  2.0[L]  /  TBili  0.8  /  DBili  x   /  AST  34[H]  /  ALT  17  /  AlkPhos  216[H]  03-28        Urinalysis Basic - ( 28 Mar 2025 06:35 )    Color: x / Appearance: x / SG: x / pH: x  Gluc: 149 mg/dL / Ketone: x  / Bili: x / Urobili: x   Blood: x / Protein: x / Nitrite: x   Leuk Esterase: x / RBC: x / WBC x   Sq Epi: x / Non Sq Epi: x / Bacteria: x            TTE:    < from: TTE W or WO Ultrasound Enhancing Agent (03.21.25 @ 17:56) >  CONCLUSIONS:      1. Technically difficult image quality.   2. Left ventricular cavity is normal in size. Left ventricular systolic function is moderately decreased with an ejection fraction of 31 % by Taylor's method of disks with an ejection fraction visually estimated at 25 to 30 %. Regional wall motion abnormalities present.   3. Multiple segmental abnormalities exist. See findings.   4. There is moderate (grade 2) left ventricular diastolic dysfunction, with elevated left ventricular filling pressure.   5. There is no evidence of a left ventricular thrombus.   6. Normal right ventricular cavity size and normal right ventricular systolic function.   7. Left atrium is severely dilated.   8. Moderate mitral regurgitation.   9. Mild tricuspid regurgitation.  10. Estimated pulmonary artery systolic pressure is 58 mmHg.  11. The inferior vena cava is normal in size measuring 1.10 cm in diameter, (normal <2.1cm) with abnormal inspiratory collapse (abnormal <50%) consistent with mildly elevated right atrial pressure (~8, range 5-10mmHg).  12. No pericardial effusion seen.  13. Compared to the transthoracic echocardiogram performed on 10/8/2024, further reduction in LVEF seen and PASP has increased.    < end of copied text >    RADIOLOGY    < from: CT Abdomen and Pelvis No Cont (03.22.25 @ 13:45) >  IMPRESSION:  *  No evidence of retroperitoneal hemorrhage or ileus.  *  Nondependent locules of air in the bladder and left ureter without   perivesicular fat stranding, nonspecific, could be secondary to recent   instrumentation versus cystitis. Recommend clinical correlation.  *  Enlarged, edematous left kidney with increased perinephric fat   stranding and air in the left renal collecting system can be seen in the   setting of pyelonephritis/ureteritis. Evaluation is limited due to   noncontrast technique. Correlation with laboratory values suggested.    < end of copied text >      MEDICATIONS  (STANDING):  albuterol/ipratropium for Nebulization 3 milliLiter(s) Nebulizer every 6 hours  aspirin enteric coated 81 milliGRAM(s) Oral daily  atorvastatin 80 milliGRAM(s) Oral at bedtime  calcitriol   Capsule 0.25 MICROGram(s) Oral daily  calcium carbonate   1250 mG (OsCal) 1 Tablet(s) Oral three times a day  chlorhexidine 2% Cloths 1 Application(s) Topical daily  dextrose 5%. 1000 milliLiter(s) (50 mL/Hr) IV Continuous <Continuous>  dextrose 5%. 1000 milliLiter(s) (100 mL/Hr) IV Continuous <Continuous>  dextrose 50% Injectable 25 Gram(s) IV Push once  dextrose 50% Injectable 12.5 Gram(s) IV Push once  dextrose 50% Injectable 25 Gram(s) IV Push once  epoetin kristina-epbx (RETACRIT) Injectable 35634 Unit(s) SubCutaneous <User Schedule>  ertapenem  IVPB 500 milliGRAM(s) IV Intermittent every 24 hours  ferrous    sulfate 325 milliGRAM(s) Oral daily  furosemide   Injectable 80 milliGRAM(s) IV Push two times a day  gabapentin 400 milliGRAM(s) Oral at bedtime  glucagon  Injectable 1 milliGRAM(s) IntraMuscular once  HYDROmorphone  Injectable 0.5 milliGRAM(s) IV Push once  insulin glargine Injectable (LANTUS) 26 Unit(s) SubCutaneous at bedtime  insulin lispro (ADMELOG) corrective regimen sliding scale   SubCutaneous three times a day before meals  iron sucrose IVPB 200 milliGRAM(s) IV Intermittent every 24 hours  metoprolol succinate ER 25 milliGRAM(s) Oral at bedtime  pantoprazole    Tablet 40 milliGRAM(s) Oral before breakfast  ranolazine 500 milliGRAM(s) Oral two times a day  sodium bicarbonate 1300 milliGRAM(s) Oral three times a day  sodium chloride 0.45% 1000 milliLiter(s) (60 mL/Hr) IV Continuous <Continuous>  sodium chloride 2% . 1000 milliLiter(s) (40 mL/Hr) IV Continuous <Continuous>  sodium chloride 2% . 1000 milliLiter(s) (30 mL/Hr) IV Continuous <Continuous>  ticagrelor 90 milliGRAM(s) Oral two times a day  urea Oral Powder 15 Gram(s) Oral daily      MEDICATIONS  (PRN):  acetaminophen     Tablet .. 650 milliGRAM(s) Oral every 6 hours PRN Temp greater or equal to 38C (100.4F), Mild Pain (1 - 3)  aluminum hydroxide/magnesium hydroxide/simethicone Suspension 30 milliLiter(s) Oral every 4 hours PRN Dyspepsia  dextrose Oral Gel 15 Gram(s) Oral once PRN Blood Glucose LESS THAN 70 milliGRAM(s)/deciliter  melatonin 3 milliGRAM(s) Oral at bedtime PRN Insomnia  ondansetron Injectable 4 milliGRAM(s) IV Push every 8 hours PRN Nausea and/or Vomiting  oxyCODONE    IR 5 milliGRAM(s) Oral every 6 hours PRN moderate to severe pain

## 2025-03-28 NOTE — PROGRESS NOTE ADULT - SUBJECTIVE AND OBJECTIVE BOX
[facility] - PROGRESS NOTE  [address] [place] [address]  Office Phone: [phone]/Fax: [phone]  Service/On Call Phone [phone]  _______________________________________________________________________________________________________    Subjective:  - NAEO. Ongoing fevers.  - Patient resting comfortably in bed with no complaints    Medications:  acetaminophen     Tablet .. 650 milliGRAM(s) Oral every 6 hours PRN  albuterol/ipratropium for Nebulization 3 milliLiter(s) Nebulizer every 6 hours  aluminum hydroxide/magnesium hydroxide/simethicone Suspension 30 milliLiter(s) Oral every 4 hours PRN  aspirin enteric coated 81 milliGRAM(s) Oral daily  atorvastatin 80 milliGRAM(s) Oral at bedtime  calcitriol   Capsule 0.25 MICROGram(s) Oral daily  calcium carbonate   1250 mG (OsCal) 1 Tablet(s) Oral three times a day  chlorhexidine 2% Cloths 1 Application(s) Topical daily  dextrose 5%. 1000 milliLiter(s) IV Continuous <Continuous>  dextrose 5%. 1000 milliLiter(s) IV Continuous <Continuous>  dextrose 50% Injectable 25 Gram(s) IV Push once  dextrose 50% Injectable 12.5 Gram(s) IV Push once  dextrose 50% Injectable 25 Gram(s) IV Push once  dextrose Oral Gel 15 Gram(s) Oral once PRN  epoetin kristina-epbx (RETACRIT) Injectable 92761 Unit(s) SubCutaneous <User Schedule>  ertapenem  IVPB 500 milliGRAM(s) IV Intermittent every 24 hours  ferrous    sulfate 325 milliGRAM(s) Oral daily  furosemide   Injectable 80 milliGRAM(s) IV Push once  gabapentin 400 milliGRAM(s) Oral at bedtime  glucagon  Injectable 1 milliGRAM(s) IntraMuscular once  hydrALAZINE 10 milliGRAM(s) Oral three times a day  HYDROmorphone  Injectable 0.5 milliGRAM(s) IV Push once  insulin glargine Injectable (LANTUS) 26 Unit(s) SubCutaneous at bedtime  insulin lispro (ADMELOG) corrective regimen sliding scale   SubCutaneous three times a day before meals  iron sucrose IVPB 200 milliGRAM(s) IV Intermittent every 24 hours  melatonin 3 milliGRAM(s) Oral at bedtime PRN  metoprolol succinate ER 50 milliGRAM(s) Oral at bedtime  ondansetron Injectable 4 milliGRAM(s) IV Push every 8 hours PRN  oxyCODONE    IR 5 milliGRAM(s) Oral every 6 hours PRN  pantoprazole    Tablet 40 milliGRAM(s) Oral before breakfast  ranolazine 500 milliGRAM(s) Oral two times a day  sodium bicarbonate 1300 milliGRAM(s) Oral three times a day  ticagrelor 90 milliGRAM(s) Oral two times a day  urea Oral Powder 15 Gram(s) Oral daily      ICU Vital Signs Last 24 Hrs  T(C): 36.6, Max: 38.2 ( @ 20:46)  HR: 75 (59 - 81)  BP: 132/62 (107/51 - 149/67)  BP(mean): --  ABP: --  ABP(mean): --  RR: 18 (17 - 18)  SpO2: 98% (94% - 99%)    Weight in k.2 (25)  Weight in k.3 (25)      I&O's Summary Last 24 Hrs    IN: 1260 mL / OUT: 2425 mL / NET: -1165 mL      Tele:  60s    Physical Exam:    General: No distress. Comfortable.  Neck: JVP elevated.  Respiratory: Clear to auscultation bilaterally  CV: RRR. Normal S1 and S2. No murmurs, rub, or gallops. Radial pulses normal.  Abdomen: Soft, non-distended, non-tender  Extremities: Warm, no edema  Neurology: Non-focal, alert and oriented times three.   Psych: Affect normal    Labs:    ( 25 @ 06:35 )               9.9    10.63 )--------( 426                  28.9     ( 25 @ 06:35 )     129  |  95  |  84.9  ---------------------<  149  4.9  |  19.0  |  2.74    Ca 7.6  Phos x   Mg x     ( 25 @ 06:35 )  TPro  5.8  /  Alb  2.0  /  TBili  0.8  /  DBili  x   /  AST  34  /  ALT  17  /  AlkPhos  216  ( 25 @ 06:35 )  TPro  5.6  /  Alb  2.3  /  TBili  0.7  /  DBili  x   /  AST  36  /  ALT  23  /  AlkPhos  244    ( 25 @ 00:20 )  TropHS 854   / CK x     / CKMB x      ( 25 @ 22:08 )  TropHS 906   / CK x     / CKMB x

## 2025-03-28 NOTE — PROGRESS NOTE ADULT - SUBJECTIVE AND OBJECTIVE BOX
NEPHROLOGY INTERVAL HPI/OVERNIGHT EVENTS:    Feels better   No new events   UO 2.4 L , Post 80 mg IV Lasix     MEDICATIONS  (STANDING):  albuterol/ipratropium for Nebulization 3 milliLiter(s) Nebulizer every 6 hours  aspirin enteric coated 81 milliGRAM(s) Oral daily  atorvastatin 80 milliGRAM(s) Oral at bedtime  calcitriol   Capsule 0.25 MICROGram(s) Oral daily  calcium carbonate   1250 mG (OsCal) 1 Tablet(s) Oral three times a day  chlorhexidine 2% Cloths 1 Application(s) Topical daily  dextrose 5%. 1000 milliLiter(s) (100 mL/Hr) IV Continuous <Continuous>  dextrose 5%. 1000 milliLiter(s) (50 mL/Hr) IV Continuous <Continuous>  dextrose 50% Injectable 25 Gram(s) IV Push once  dextrose 50% Injectable 12.5 Gram(s) IV Push once  dextrose 50% Injectable 25 Gram(s) IV Push once  epoetin kristina-epbx (RETACRIT) Injectable 41734 Unit(s) SubCutaneous <User Schedule>  ertapenem  IVPB 500 milliGRAM(s) IV Intermittent every 24 hours  ferrous    sulfate 325 milliGRAM(s) Oral daily  furosemide   Injectable 80 milliGRAM(s) IV Push once  gabapentin 400 milliGRAM(s) Oral at bedtime  glucagon  Injectable 1 milliGRAM(s) IntraMuscular once  hydrALAZINE 10 milliGRAM(s) Oral three times a day  HYDROmorphone  Injectable 0.5 milliGRAM(s) IV Push once  insulin glargine Injectable (LANTUS) 26 Unit(s) SubCutaneous at bedtime  insulin lispro (ADMELOG) corrective regimen sliding scale   SubCutaneous three times a day before meals  iron sucrose IVPB 200 milliGRAM(s) IV Intermittent every 24 hours  metoprolol succinate ER 50 milliGRAM(s) Oral at bedtime  pantoprazole    Tablet 40 milliGRAM(s) Oral before breakfast  ranolazine 500 milliGRAM(s) Oral two times a day  sodium bicarbonate 1300 milliGRAM(s) Oral three times a day  ticagrelor 90 milliGRAM(s) Oral two times a day  urea Oral Powder 15 Gram(s) Oral daily    MEDICATIONS  (PRN):  acetaminophen     Tablet .. 650 milliGRAM(s) Oral every 6 hours PRN Temp greater or equal to 38C (100.4F), Mild Pain (1 - 3)  aluminum hydroxide/magnesium hydroxide/simethicone Suspension 30 milliLiter(s) Oral every 4 hours PRN Dyspepsia  dextrose Oral Gel 15 Gram(s) Oral once PRN Blood Glucose LESS THAN 70 milliGRAM(s)/deciliter  melatonin 3 milliGRAM(s) Oral at bedtime PRN Insomnia  ondansetron Injectable 4 milliGRAM(s) IV Push every 8 hours PRN Nausea and/or Vomiting  oxyCODONE    IR 5 milliGRAM(s) Oral every 6 hours PRN moderate to severe pain      Allergies    No Known Allergies    Intolerances          Vital Signs Last 24 Hrs  T(C): 36.6 (28 Mar 2025 12:42), Max: 38.2 (27 Mar 2025 20:46)  T(F): 97.9 (28 Mar 2025 12:42), Max: 100.7 (27 Mar 2025 20:46)  HR: 75 (28 Mar 2025 12:42) (59 - 81)  BP: 132/62 (28 Mar 2025 12:42) (107/51 - 149/67)  BP(mean): --  RR: 18 (28 Mar 2025 12:42) (17 - 18)  SpO2: 98% (28 Mar 2025 12:42) (94% - 99%)    Parameters below as of 28 Mar 2025 12:42  Patient On (Oxygen Delivery Method): room air      Daily     Daily Weight in k.2 (27 Mar 2025 19:20)  I&O's Detail    27 Mar 2025 07:01  -  28 Mar 2025 07:00  --------------------------------------------------------  IN:    Lactated Ringers: 540 mL    Oral Fluid: 720 mL  Total IN: 1260 mL    OUT:    Indwelling Catheter - Urethral (mL): 2425 mL  Total OUT: 2425 mL    Total NET: -1165 mL      28 Mar 2025 07:01  -  28 Mar 2025 14:05  --------------------------------------------------------  IN:    Oral Fluid: 480 mL  Total IN: 480 mL    OUT:  Total OUT: 0 mL    Total NET: 480 mL        I&O's Summary    27 Mar 2025 07:  -  28 Mar 2025 07:00  --------------------------------------------------------  IN: 1260 mL / OUT: 2425 mL / NET: -1165 mL    28 Mar 2025 07:01  -  28 Mar 2025 14:05  --------------------------------------------------------  IN: 480 mL / OUT: 0 mL / NET: 480 mL      PHYSICAL EXAM:  GENERAL: Debilitated  HEENT: No periorbital edema  NECK: Supple, No JVD  NERVOUS SYSTEM: Awake, alert  CHEST: Clear, diminished BS  HEART:  RRR, No rub  ABDOMEN: Soft,  BS+  EXTREMITIES: Tr LE edema  SKIN: No rash  : Sierra  LABS:                        9.9    10.63 )-----------( 426      ( 28 Mar 2025 06:35 )             28.    129[L]  |  95[L]  |  84.9[H]  ----------------------------<  149[H]  4.9   |  19.0[L]  |  2.74[H]    Ca    7.6[L]      28 Mar 2025 06:35    TPro  5.8[L]  /  Alb  2.0[L]  /  TBili  0.8  /  DBili  x   /  AST  34[H]  /  ALT  17  /  AlkPhos  216[H]        Urinalysis Basic - ( 28 Mar 2025 06:35 )    Color: x / Appearance: x / SG: x / pH: x  Gluc: 149 mg/dL / Ketone: x  / Bili: x / Urobili: x   Blood: x / Protein: x / Nitrite: x   Leuk Esterase: x / RBC: x / WBC x   Sq Epi: x / Non Sq Epi: x / Bacteria: x              RADIOLOGY & ADDITIONAL TESTS:

## 2025-03-28 NOTE — PROGRESS NOTE ADULT - PROBLEM SELECTOR PLAN 4
- On 3/22 patient developed fever and leukocytosis.   - Blood cultures were positive for E.Coli/ESBL and CT A/P showed left pyelonephritis  - Continue abx per ID.

## 2025-03-28 NOTE — DIETITIAN INITIAL EVALUATION ADULT - PERTINENT MEDS FT
MEDICATIONS  (STANDING):  calcitriol   Capsule 0.25 MICROGram(s) Oral daily  calcium carbonate   1250 mG (OsCal) 1 Tablet(s) Oral three times a day  dextrose 5%. 1000 milliLiter(s) (100 mL/Hr) IV Continuous <Continuous>  dextrose 50% Injectable 25 Gram(s) IV Push once  epoetin kristina-epbx (RETACRIT) Injectable 46571 Unit(s) SubCutaneous <User Schedule>  ertapenem  IVPB 500 milliGRAM(s) IV Intermittent every 24 hours  ferrous    sulfate 325 milliGRAM(s) Oral daily  furosemide   Injectable 80 milliGRAM(s) IV Push once  glucagon  Injectable 1 milliGRAM(s) IntraMuscular once  hydrALAZINE 10 milliGRAM(s) Oral three times a day  HYDROmorphone  Injectable 0.5 milliGRAM(s) IV Push once  insulin glargine Injectable (LANTUS) 26 Unit(s) SubCutaneous at bedtime  insulin lispro (ADMELOG) corrective regimen sliding scale   SubCutaneous three times a day before meals  iron sucrose IVPB 200 milliGRAM(s) IV Intermittent every 24 hours  metoprolol succinate ER 50 milliGRAM(s) Oral at bedtime  pantoprazole    Tablet 40 milliGRAM(s) Oral before breakfast  ranolazine 500 milliGRAM(s) Oral two times a day  sodium bicarbonate 1300 milliGRAM(s) Oral three times a day  ticagrelor 90 milliGRAM(s) Oral two times a day  urea Oral Powder 15 Gram(s) Oral daily    MEDICATIONS  (PRN):  dextrose Oral Gel 15 Gram(s) Oral once PRN Blood Glucose LESS THAN 70 milliGRAM(s)/deciliter  ondansetron Injectable 4 milliGRAM(s) IV Push every 8 hours PRN Nausea and/or Vomiting  oxyCODONE    IR 5 milliGRAM(s) Oral every 6 hours PRN moderate to severe pain

## 2025-03-28 NOTE — PROGRESS NOTE ADULT - SUBJECTIVE AND OBJECTIVE BOX
[facility]  [facility] at [place] [facility] [place] [facility]  =======================================================                              Leo Lam MD                              Professor Emeritus:  Dr Angelo Sebastian MD            Diplomates American Board of Internal Medicine & Infectious Diseases                                   Tel  [phone] Fax [phone]                                  Hospital Consult line:  [phone]  =======================================================    Follow Up: Bacteremia, pyelonephritis     Interval History/ROS: Seen at bedside. Pending repeat CT     REVIEW OF SYSTEMS  + Fevers last night  Rest unchanged from prior     Allergies  No Known Allergies    ANTIMICROBIALS:    ertapenem  IVPB 500 every 24 hours    OTHER MEDS: MEDICATIONS  (STANDING):  acetaminophen     Tablet .. 650 every 6 hours PRN  albuterol/ipratropium for Nebulization 3 every 6 hours  aluminum hydroxide/magnesium hydroxide/simethicone Suspension 30 every 4 hours PRN  aspirin enteric coated 81 daily  atorvastatin 80 at bedtime  dextrose 50% Injectable 25 once  dextrose 50% Injectable 12.5 once  dextrose 50% Injectable 25 once  dextrose Oral Gel 15 once PRN  epoetin kristina-epbx (RETACRIT) Injectable 20000 <User Schedule>  furosemide   Injectable 80 two times a day  gabapentin 400 at bedtime  glucagon  Injectable 1 once  hydrALAZINE 10 three times a day  HYDROmorphone  Injectable 0.5 once  insulin glargine Injectable (LANTUS) 26 at bedtime  insulin lispro (ADMELOG) corrective regimen sliding scale  three times a day before meals  melatonin 3 at bedtime PRN  metoprolol succinate ER 50 at bedtime  ondansetron Injectable 4 every 8 hours PRN  oxyCODONE    IR 5 every 6 hours PRN  pantoprazole    Tablet 40 before breakfast  ranolazine 500 two times a day  ticagrelor 90 two times a day  urea Oral Powder 15 daily    Vital Signs Last 24 Hrs  T(F): 98.3 (03-28-25 @ 07:45), Max: 101.8 (03-24-25 @ 17:57)    Vital Signs Last 24 Hrs  HR: 64 (03-28-25 @ 07:45) (59 - 81)  BP: 123/54 (03-28-25 @ 07:45) (107/51 - 149/67)  RR: 17 (03-28-25 @ 07:45)  SpO2: 97% (03-28-25 @ 07:45) (94% - 99%)  Wt(kg): --    EXAM:  General: In NAD   HEENT: NCAT  CV: S1+S2  Lungs: No acute respiratory distress  Abd: Soft  : Sierra   Ext: No cyanosis  Neuro: Calm   Skin: No rash     Labs:                        9.9    10.63 )-----------( 426      ( 28 Mar 2025 06:35 )             28.9     03-28    129[L]  |  95[L]  |  84.9[H]  ----------------------------<  149[H]  4.9   |  19.0[L]  |  2.74[H]    Ca    7.6[L]      28 Mar 2025 06:35    TPro  5.8[L]  /  Alb  2.0[L]  /  TBili  0.8  /  DBili  x   /  AST  34[H]  /  ALT  17  /  AlkPhos  216[H]  03-28    WBC Trend:  WBC Count: 10.63 (03-28-25 @ 06:35)  WBC Count: 9.87 (03-27-25 @ 06:35)  WBC Count: 11.92 (03-26-25 @ 11:34)  WBC Count: 13.44 (03-25-25 @ 08:09)    Creatine Trend:  Creatinine: 2.74 (03-28)  Creatinine: 3.08 (03-27)  Creatinine: 3.46 (03-26)  Creatinine: 3.54 (03-25)    Liver Biochemical Testing Trend:  Alanine Aminotransferase (ALT/SGPT): 17 (03-28)  Alanine Aminotransferase (ALT/SGPT): 23 (03-27)  Alanine Aminotransferase (ALT/SGPT): 25 (03-26)  Alanine Aminotransferase (ALT/SGPT): 20 (03-25)  Alanine Aminotransferase (ALT/SGPT): 11 (03-23)  Aspartate Aminotransferase (AST/SGOT): 34 (03-28-25 @ 06:35)  Aspartate Aminotransferase (AST/SGOT): 36 (03-27-25 @ 06:35)  Aspartate Aminotransferase (AST/SGOT): 59 (03-26-25 @ 06:09)  Aspartate Aminotransferase (AST/SGOT): 38 (03-25-25 @ 01:20)  Aspartate Aminotransferase (AST/SGOT): 20 (03-23-25 @ 06:15)  Bilirubin Total: 0.8 (03-28)  Bilirubin Total: 0.7 (03-27)  Bilirubin Total: 0.7 (03-26)  Bilirubin Total: 0.7 (03-25)  Bilirubin Total: 0.7 (03-23)    Urinalysis Basic - ( 28 Mar 2025 06:35 )    Color: x / Appearance: x / SG: x / pH: x  Gluc: 149 mg/dL / Ketone: x  / Bili: x / Urobili: x   Blood: x / Protein: x / Nitrite: x   Leuk Esterase: x / RBC: x / WBC x   Sq Epi: x / Non Sq Epi: x / Bacteria: x    MICROBIOLOGY:    MRSA PCR Result.: NotDetec (03-20-25 @ 13:30)  MRSA PCR Result.: NotDetec (01-13-25 @ 04:00)  MRSA PCR Result.: NotDetec (10-14-24 @ 04:00)  MRSA PCR Result.: NotDetec (07-12-24 @ 17:45)  MRSA PCR Result.: NotDetec (05-10-24 @ 10:10)    Culture - Blood (collected 26 Mar 2025 20:15)  Source: Blood Blood  Preliminary Report:    No growth at 24 hours    Culture - Blood (collected 25 Mar 2025 08:09)  Source: Blood Blood-Peripheral  Preliminary Report:    No growth at 48 Hours    Culture - Blood (collected 25 Mar 2025 08:00)  Source: Blood Blood-Peripheral  Preliminary Report:    No growth at 48 Hours    Culture - Blood (collected 24 Mar 2025 05:28)  Source: Blood Blood-Venous  Preliminary Report:    No growth at 72 Hours    Culture - Blood (collected 24 Mar 2025 05:20)  Source: Blood Blood-Venous  Preliminary Report:    Growth in aerobic bottle: Gram Negative Rods    Culture - Urine (collected 22 Mar 2025 15:30)  Source: Catheterized Catheterized  Final Report:    >100,000 CFU/ml Escherichia coli ESBL  Organism: Escherichia coli ESBL  Organism: Escherichia coli ESBL    Sensitivities:      Method Type: RENNY      -  Ampicillin: R >16 These ampicillin results predict results for amoxicillin      -  Ampicillin/Sulbactam: R >16/8      -  Aztreonam: R >16      -  Cefazolin: R >16 For uncomplicated UTI with K. pneumoniae, E. coli, or P. mirablis: RENNY <=16 is sensitive and RENNY >=32 is resistant. This also predicts results for oral agents cefaclor, cefdinir, cefpodoxime, cefprozil, cefuroxime axetil, cephalexin and locarbef for uncomplicated UTI. Note that some isolates may be susceptible to these agents while testing resistant to cefazolin.      -  Cefepime: R >16      -  Ceftriaxone: R >32      -  Cefuroxime: R >16      -  Ciprofloxacin: R >2      -  Ertapenem: S <=0.5      -  Gentamicin: S <=2      -  Imipenem: S <=1      -  Levofloxacin: R >4      -  Meropenem: S <=1      -  Nitrofurantoin: S <=32 Should not be used to treat pyelonephritis      -  Piperacillin/Tazobactam: S <=8      -  Tobramycin: S <=2      -  Trimethoprim/Sulfamethoxazole: R >2/38    Culture - Blood (collected 22 Mar 2025 15:20)  Source: Blood Blood-Peripheral  Final Report:    Growth in aerobic and anaerobic bottles: Escherichia coli ESBL    Direct identification is available within approximately 3-5    hours either by Blood Panel Multiplexed PCR or Direct    MALDI-TOF. Details: [email or web address]  Organism: Blood Culture PCR  Escherichia coli ESBL  Organism: Escherichia coli ESBL    Sensitivities:      Method Type: RENNY      -  Ampicillin: R >16 These ampicillin results predict results for amoxicillin      -  Ampicillin/Sulbactam: R >16/8      -  Aztreonam: R >16      -  Cefazolin: R >16      -  Cefepime: R >16      -  Ceftriaxone: R >32      -  Ciprofloxacin: R >2      -  Ertapenem: S <=0.5      -  Gentamicin: S <=2      -  Imipenem: S <=1      -  Levofloxacin: R >4      -  Meropenem: S <=1      -  Piperacillin/Tazobactam: R <=8      -  Tobramycin: S <=2      -  Trimethoprim/Sulfamethoxazole: R >2/38  Organism: Blood Culture PCR    Sensitivities:      Method Type: PCR      -  Escherichia coli: Detec      -  ESBL: Detec      -  CTX-M Resistance Marker: Detec    Culture - Blood (collected 22 Mar 2025 15:12)  Source: Blood Blood-Peripheral  Final Report:    Growth in aerobic and anaerobic bottles: Escherichia coli    See previous culture [id number]    Culture - Urine (collected 11 Dec 2024 10:52)  Source: Catheterized Catheterized  Final Report:    >100,000 CFU/ml Escherichia coli ESBL  Organism: Escherichia coli ESBL  Organism: Escherichia coli ESBL    Sensitivities:      Method Type: RENNY      -  Ampicillin: R >16 These ampicillin results predict results for amoxicillin      -  Ampicillin/Sulbactam: R >16/8      -  Aztreonam: R >16      -  Cefazolin: R >16 For uncomplicated UTI with K. pneumoniae, E. coli, or P. mirablis: RENNY <=16 is sensitive and RENNY >=32 is resistant. This also predicts results for oral agents cefaclor, cefdinir, cefpodoxime, cefprozil, cefuroxime axetil, cephalexin and locarbef for uncomplicated UTI. Note that some isolates may be susceptible to these agents while testing resistant to cefazolin.      -  Cefepime: R >16      -  Ceftriaxone: R >32      -  Cefuroxime: R >16      -  Ciprofloxacin: R >2      -  Ertapenem: S <=0.5      -  Gentamicin: S <=2      -  Imipenem: S <=1      -  Levofloxacin: R >4      -  Meropenem: S <=1      -  Nitrofurantoin: S <=32 Should not be used to treat pyelonephritis      -  Piperacillin/Tazobactam: R >64      -  Tobramycin: S <=2      -  Trimethoprim/Sulfamethoxazole: R >2/38    Culture - Urine (collected 08 Oct 2024 14:19)  Source: Clean Catch Clean Catch (Midstream)  Final Report:    >100,000 CFU/ml Escherichia coli ESBL  Organism: Escherichia coli ESBL  Organism: Escherichia coli ESBL    Sensitivities:      Method Type: RENNY      -  Ampicillin: R >16 These ampicillin results predict results for amoxicillin      -  Ampicillin/Sulbactam: R >16/8      -  Aztreonam: R >16      -  Cefazolin: R >16 For uncomplicated UTI with K. pneumoniae, E. coli, or P. mirablis: RENNY <=16 is sensitive and RENNY >=32 is resistant. This also predicts results for oral agents cefaclor, cefdinir, cefpodoxime, cefprozil, cefuroxime axetil, cephalexin and locarbef for uncomplicated UTI. Note that some isolates may be susceptible to these agents while testing resistant to cefazolin.      -  Cefepime: R >16      -  Ceftriaxone: R >32      -  Cefuroxime: R >16      -  Ciprofloxacin: R >2      -  Ertapenem: S <=0.5      -  Gentamicin: S <=2      -  Imipenem: S <=1      -  Levofloxacin: R >4      -  Meropenem: S <=1      -  Nitrofurantoin: S <=32 Should not be used to treat pyelonephritis      -  Piperacillin/Tazobactam: S <=8      -  Tobramycin: S <=2      -  Trimethoprim/Sulfamethoxazole: R >2/38    Rapid RVP Result: NotDetec (03-22 @ 14:27)    Ferritin: 350 (03-25)    RADIOLOGY:  imaging below personally reviewed    < from: CT Abdomen and Pelvis No Cont (03.22.25 @ 13:45) >  IMPRESSION:  *  No evidence of retroperitoneal hemorrhage or ileus.  *  Nondependent locules of air in the bladder and left ureter without   perivesicular fat stranding, nonspecific, could be secondary to recent   instrumentation versus cystitis. Recommend clinical correlation.  *  Enlarged, edematous left kidney with increased perinephric fat   stranding and air in the left renal collecting system can be seen in the   setting of pyelonephritis/ureteritis. Evaluation is limited due to   noncontrast technique.     < end of copied text >

## 2025-03-28 NOTE — DIETITIAN INITIAL EVALUATION ADULT - HEIGHT FOR BMI (INCHES)
Abdomen soft, non-tender and non-distended, no rebound, no guarding and no masses. no hepatosplenomegaly.
11

## 2025-03-28 NOTE — DIETITIAN INITIAL EVALUATION ADULT - ORAL INTAKE PTA/DIET HISTORY
Patient Malian speaking -  services used (ID# [id number]). Patient just received breakfast tray upon visit. Pt stating her appetite has bene poor for about 3 days. Pt kept falling asleep during interview - spoke with nursing staff for remaining questions. Pt ate ~75% of dinner last night and tolerating diet well. No reported c/o N/V/C/D at this time. Current weight 159.1 lbs, moderate edema noted. Education on Heart healthy, consistent carbohydrate diet and potassium content of foods left at bedside with RD contact information provided for further nutrition-related questions or concerns. Will continue to monitor and follow up as needed. RD remains available.

## 2025-03-28 NOTE — DIETITIAN INITIAL EVALUATION ADULT - PERTINENT LABORATORY DATA
03-28 Na129 mmol/L[L] Glu 149 mg/dL[H] K+ 4.9 mmol/L Cr  2.74 mg/dL[H] BUN 84.9 mg/dL[H]  Alb 2.0 g/dL[L]     POCT Blood Glucose.: 144 mg/dL (03-28-25 @ 12:24)  A1C with Estimated Average Glucose Result: 8.5 % (03-22-25 @ 04:45)  A1C with Estimated Average Glucose Result: 7.1 % (10-09-24 @ 04:43)  A1C with Estimated Average Glucose Result: 8.4 % (06-18-24 @ 11:50)

## 2025-03-28 NOTE — PROVIDER CONTACT NOTE (CRITICAL VALUE NOTIFICATION) - TEST AND RESULT REPORTED:
calcium 6.3
Sodium: 118 and Calcium: 6.2
Calcium 6.5
Blood culture 3/24: Growth is Aerobic bottle gram - rods
Blood cultures from 3/22 Growth in aerobic and anaerobic bottles Ecoli/ ESBL.
Blood Gas Venous Sodium: 120

## 2025-03-28 NOTE — DIETITIAN INITIAL EVALUATION ADULT - NS FNS DIET ORDER
Diet, Consistent Carbohydrate w/Evening Snack:   DASH/TLC {Sodium & Cholesterol Restricted} (DASH)  1000mL Fluid Restriction ([id number])  No Concentrated Potassium (03-28-25 @ 09:43)

## 2025-03-28 NOTE — PROGRESS NOTE ADULT - PROBLEM SELECTOR PLAN 2
- BNP 92566  - No JVD, no SOB, on room air, no edema; euvolemic on exam  - TTE from 12/2024 with EF of 39% with multiple segment abnormalities, mild-mod MR, and mild TR  - Strict I&Os  - Daily weights, standing if able  - Low sodium diet when able to have PO intake  - GDMT: as per advanced heart failure  - PA Diastolic reading on cardiomems today is 13. Hold diuresis, consider PO diuretics tomorrow AM   - advanced heart failure to follow up tomorrow AM
- BNP 43927  - No JVD, no SOB, on room air, no edema; euvolemic on exam  - TTE from 12/2024 with EF of 39% with multiple segment abnormalities, mild-mod MR, and mild TR  - Strict I&Os  - Daily weights, standing if able  - Low sodium diet when able to have PO intake  - GDMT: as per advanced heart failure
- C as above, s/p PCI to RCA  - Continue DAPT, statin, Ranexa.

## 2025-03-28 NOTE — PROGRESS NOTE ADULT - ASSESSMENT
Gen Cards: Dr. Allen  HF: Dr. Polanco    73 y/o female with PMH of ICM/HFrEF (LVEF 35-40%), CAD s/p PCI, Mobitz II s/p PPM (MD NereydaT), HTN, HLD, PAD with chronic right LE wound, DM2, and CKD3, who presented to the ED on 3/19 with c/o CP, found to have NSTEMI.     She is s/p LHC on 3/21 which showed mid-distal LAD severe stenosis, OM1 and OM2 severe stenosis, mid ISR 50%, distal ISR 99%, s/p PCI with 1 ZEINAB. Her PAD on her CardioMEMS was 22 on 3/21 (goal 14) so her diuretics were resumed with Lasix 40 mg IV BID. On 3/22 she had a fever with leukocytosis and her blood cultures are positive for E. Coli/ESBL. Her CT A/P showed left pyelonephritis. She also has an BRADY for which her diuretics and Entresto were held.    CardioMEMS PAD (goal 14):  3/27: 26  3/24: 23  3/21: 22

## 2025-03-28 NOTE — PROVIDER CONTACT NOTE (CRITICAL VALUE NOTIFICATION) - PERSON GIVING RESULT:
[facility]
lab
Lab: Rajesh Rodriguez
[facility]
Erwin Royal
Nichol Rodriguez, [facility]

## 2025-03-28 NOTE — DIETITIAN INITIAL EVALUATION ADULT - ADD RECOMMEND
no 1) Continue diet as tolerated.   2) Encourage po intake, monitor diet tolerance, and provide assistance at meals as needed.   3) Rx: MVI daily.   4) Monitor BG levels, correct prn   5) Obtain daily weights to monitor trends.

## 2025-03-28 NOTE — PROGRESS NOTE ADULT - PROBLEM SELECTOR PLAN 5
- Receiving Venofer  - No s/s of bleeding but occult stool +. s/p 1 unit PRBC overnight.  - Continue to monitor H/H

## 2025-03-28 NOTE — PROGRESS NOTE ADULT - ASSESSMENT
CKD(III): baseline Screat 1.3- 1.8  BRADY, ATN post cardiac cath on 03/22 PCI to RCA with ZEINAB and hypotensive event  Hypervolemic hyponatremia  MA  CT abd noted:  No hydronephrosis - Enlarged edematous L kidney w increased perinephric fat stranding- Likely Pyelonephritis   ESBL Bacteremia  - cont to avoid further potential nephrotoxins  - cont IV antibiotics , ID follow up noted       Hyponatremia   - cont PO fluid restriction and PO Urea , Oral Bicarb     MA - Oral Bicarb     Anemia: +CKD + low Fe  - cont IV Fe  - added RONDA    RO: secondary HPT  - cont Rocaltrol  - added CaCO3

## 2025-03-29 LAB
ANION GAP SERPL CALC-SCNC: 15 MMOL/L — SIGNIFICANT CHANGE UP (ref 5–17)
BUN SERPL-MCNC: 89.2 MG/DL — HIGH (ref 8–20)
CALCIUM SERPL-MCNC: 8 MG/DL — LOW (ref 8.4–10.5)
CHLORIDE SERPL-SCNC: 93 MMOL/L — LOW (ref 96–108)
CO2 SERPL-SCNC: 23 MMOL/L — SIGNIFICANT CHANGE UP (ref 22–29)
CREAT SERPL-MCNC: 3.05 MG/DL — HIGH (ref 0.5–1.3)
CULTURE RESULTS: ABNORMAL
CULTURE RESULTS: NO GROWTH — SIGNIFICANT CHANGE UP
CULTURE RESULTS: SIGNIFICANT CHANGE UP
EGFR: 16 ML/MIN/1.73M2 — LOW
EGFR: 16 ML/MIN/1.73M2 — LOW
GLUCOSE BLDC GLUCOMTR-MCNC: 194 MG/DL — HIGH (ref 70–99)
GLUCOSE BLDC GLUCOMTR-MCNC: 237 MG/DL — HIGH (ref 70–99)
GLUCOSE BLDC GLUCOMTR-MCNC: 237 MG/DL — HIGH (ref 70–99)
GLUCOSE BLDC GLUCOMTR-MCNC: 273 MG/DL — HIGH (ref 70–99)
GLUCOSE SERPL-MCNC: 195 MG/DL — HIGH (ref 70–99)
HCT VFR BLD CALC: 26.4 % — LOW (ref 34.5–45)
HGB BLD-MCNC: 9.1 G/DL — LOW (ref 11.5–15.5)
MAGNESIUM SERPL-MCNC: 2.1 MG/DL — SIGNIFICANT CHANGE UP (ref 1.6–2.6)
MCHC RBC-ENTMCNC: 29.1 PG — SIGNIFICANT CHANGE UP (ref 27–34)
MCHC RBC-ENTMCNC: 34.5 G/DL — SIGNIFICANT CHANGE UP (ref 32–36)
MCV RBC AUTO: 84.3 FL — SIGNIFICANT CHANGE UP (ref 80–100)
NRBC # BLD AUTO: 0.03 K/UL — HIGH (ref 0–0)
NRBC # FLD: 0.03 K/UL — HIGH (ref 0–0)
NRBC BLD AUTO-RTO: 0 /100 WBCS — SIGNIFICANT CHANGE UP (ref 0–0)
PHOSPHATE SERPL-MCNC: 4.1 MG/DL — SIGNIFICANT CHANGE UP (ref 2.4–4.7)
PLATELET # BLD AUTO: 443 K/UL — HIGH (ref 150–400)
PMV BLD: 9.9 FL — SIGNIFICANT CHANGE UP (ref 7–13)
POTASSIUM SERPL-MCNC: 4.5 MMOL/L — SIGNIFICANT CHANGE UP (ref 3.5–5.3)
POTASSIUM SERPL-SCNC: 4.5 MMOL/L — SIGNIFICANT CHANGE UP (ref 3.5–5.3)
RBC # BLD: 3.13 M/UL — LOW (ref 3.8–5.2)
RBC # FLD: 15.9 % — HIGH (ref 10.3–14.5)
SODIUM SERPL-SCNC: 131 MMOL/L — LOW (ref 135–145)
SPECIMEN SOURCE: SIGNIFICANT CHANGE UP
WBC # BLD: 10.07 K/UL — SIGNIFICANT CHANGE UP (ref 3.8–10.5)
WBC # FLD AUTO: 10.07 K/UL — SIGNIFICANT CHANGE UP (ref 3.8–10.5)

## 2025-03-29 PROCEDURE — 93970 EXTREMITY STUDY: CPT | Mod: 26

## 2025-03-29 PROCEDURE — 99232 SBSQ HOSP IP/OBS MODERATE 35: CPT

## 2025-03-29 RX ORDER — TAMSULOSIN HYDROCHLORIDE 0.4 MG/1
0.4 CAPSULE ORAL DAILY
Refills: 0 | Status: DISCONTINUED | OUTPATIENT
Start: 2025-03-29 | End: 2025-03-31

## 2025-03-29 RX ADMIN — EPOETIN ALFA 20000 UNIT(S): 10000 SOLUTION INTRAVENOUS; SUBCUTANEOUS at 12:09

## 2025-03-29 RX ADMIN — INSULIN LISPRO 4: 100 INJECTION, SOLUTION INTRAVENOUS; SUBCUTANEOUS at 17:43

## 2025-03-29 RX ADMIN — Medication 81 MILLIGRAM(S): at 13:16

## 2025-03-29 RX ADMIN — METOPROLOL SUCCINATE 50 MILLIGRAM(S): 50 TABLET, EXTENDED RELEASE ORAL at 22:13

## 2025-03-29 RX ADMIN — CALCITRIOL 0.25 MICROGRAM(S): 0.5 CAPSULE, GELATIN COATED ORAL at 13:17

## 2025-03-29 RX ADMIN — Medication 10 MILLIGRAM(S): at 22:13

## 2025-03-29 RX ADMIN — IPRATROPIUM BROMIDE AND ALBUTEROL SULFATE 3 MILLILITER(S): .5; 2.5 SOLUTION RESPIRATORY (INHALATION) at 20:18

## 2025-03-29 RX ADMIN — Medication 1 APPLICATION(S): at 13:19

## 2025-03-29 RX ADMIN — Medication 10 MILLIGRAM(S): at 13:17

## 2025-03-29 RX ADMIN — TICAGRELOR 90 MILLIGRAM(S): 90 TABLET ORAL at 05:21

## 2025-03-29 RX ADMIN — Medication 10 MILLIGRAM(S): at 05:20

## 2025-03-29 RX ADMIN — ERTAPENEM SODIUM 100 MILLIGRAM(S): 1 INJECTION, POWDER, LYOPHILIZED, FOR SOLUTION INTRAMUSCULAR; INTRAVENOUS at 09:37

## 2025-03-29 RX ADMIN — IPRATROPIUM BROMIDE AND ALBUTEROL SULFATE 3 MILLILITER(S): .5; 2.5 SOLUTION RESPIRATORY (INHALATION) at 04:10

## 2025-03-29 RX ADMIN — Medication 1300 MILLIGRAM(S): at 22:12

## 2025-03-29 RX ADMIN — Medication 15 GRAM(S): at 13:16

## 2025-03-29 RX ADMIN — CALCIUM CARBONATE 1 TABLET(S): 750 TABLET ORAL at 13:19

## 2025-03-29 RX ADMIN — CALCIUM CARBONATE 1 TABLET(S): 750 TABLET ORAL at 22:13

## 2025-03-29 RX ADMIN — IPRATROPIUM BROMIDE AND ALBUTEROL SULFATE 3 MILLILITER(S): .5; 2.5 SOLUTION RESPIRATORY (INHALATION) at 14:21

## 2025-03-29 RX ADMIN — Medication 1300 MILLIGRAM(S): at 13:16

## 2025-03-29 RX ADMIN — TICAGRELOR 90 MILLIGRAM(S): 90 TABLET ORAL at 17:45

## 2025-03-29 RX ADMIN — Medication 40 MILLIGRAM(S): at 05:20

## 2025-03-29 RX ADMIN — Medication 325 MILLIGRAM(S): at 13:18

## 2025-03-29 RX ADMIN — RANOLAZINE 500 MILLIGRAM(S): 1000 TABLET, FILM COATED, EXTENDED RELEASE ORAL at 17:44

## 2025-03-29 RX ADMIN — IPRATROPIUM BROMIDE AND ALBUTEROL SULFATE 3 MILLILITER(S): .5; 2.5 SOLUTION RESPIRATORY (INHALATION) at 08:21

## 2025-03-29 RX ADMIN — INSULIN LISPRO 4: 100 INJECTION, SOLUTION INTRAVENOUS; SUBCUTANEOUS at 12:09

## 2025-03-29 RX ADMIN — CALCIUM CARBONATE 1 TABLET(S): 750 TABLET ORAL at 05:20

## 2025-03-29 RX ADMIN — TAMSULOSIN HYDROCHLORIDE 0.4 MILLIGRAM(S): 0.4 CAPSULE ORAL at 13:15

## 2025-03-29 RX ADMIN — IRON SUCROSE 110 MILLIGRAM(S): 20 INJECTION, SOLUTION INTRAVENOUS at 17:44

## 2025-03-29 RX ADMIN — ATORVASTATIN CALCIUM 80 MILLIGRAM(S): 80 TABLET, FILM COATED ORAL at 22:12

## 2025-03-29 RX ADMIN — INSULIN GLARGINE-YFGN 26 UNIT(S): 100 INJECTION, SOLUTION SUBCUTANEOUS at 22:12

## 2025-03-29 RX ADMIN — INSULIN LISPRO 2: 100 INJECTION, SOLUTION INTRAVENOUS; SUBCUTANEOUS at 08:11

## 2025-03-29 RX ADMIN — Medication 1300 MILLIGRAM(S): at 05:20

## 2025-03-29 RX ADMIN — Medication 30 MILLILITER(S): at 22:17

## 2025-03-29 RX ADMIN — RANOLAZINE 500 MILLIGRAM(S): 1000 TABLET, FILM COATED, EXTENDED RELEASE ORAL at 05:20

## 2025-03-29 RX ADMIN — GABAPENTIN 400 MILLIGRAM(S): 400 CAPSULE ORAL at 22:13

## 2025-03-29 NOTE — PROGRESS NOTE ADULT - SUBJECTIVE AND OBJECTIVE BOX
NEPHROLOGY INTERVAL HPI/OVERNIGHT EVENTS:    feels better   laying flat  No SOB or CP   UO 2.3 L with schroeder     MEDICATIONS  (STANDING):  albuterol/ipratropium for Nebulization 3 milliLiter(s) Nebulizer every 6 hours  aspirin enteric coated 81 milliGRAM(s) Oral daily  atorvastatin 80 milliGRAM(s) Oral at bedtime  calcitriol   Capsule 0.25 MICROGram(s) Oral daily  calcium carbonate   1250 mG (OsCal) 1 Tablet(s) Oral three times a day  chlorhexidine 2% Cloths 1 Application(s) Topical daily  dextrose 5%. 1000 milliLiter(s) (100 mL/Hr) IV Continuous <Continuous>  dextrose 5%. 1000 milliLiter(s) (50 mL/Hr) IV Continuous <Continuous>  dextrose 50% Injectable 25 Gram(s) IV Push once  dextrose 50% Injectable 12.5 Gram(s) IV Push once  dextrose 50% Injectable 25 Gram(s) IV Push once  epoetin kristina-epbx (RETACRIT) Injectable 15882 Unit(s) SubCutaneous <User Schedule>  ertapenem  IVPB 500 milliGRAM(s) IV Intermittent every 24 hours  ferrous    sulfate 325 milliGRAM(s) Oral daily  gabapentin 400 milliGRAM(s) Oral at bedtime  glucagon  Injectable 1 milliGRAM(s) IntraMuscular once  hydrALAZINE 10 milliGRAM(s) Oral three times a day  HYDROmorphone  Injectable 0.5 milliGRAM(s) IV Push once  insulin glargine Injectable (LANTUS) 26 Unit(s) SubCutaneous at bedtime  insulin lispro (ADMELOG) corrective regimen sliding scale   SubCutaneous three times a day before meals  iron sucrose IVPB 200 milliGRAM(s) IV Intermittent every 24 hours  metoprolol succinate ER 50 milliGRAM(s) Oral at bedtime  pantoprazole    Tablet 40 milliGRAM(s) Oral before breakfast  ranolazine 500 milliGRAM(s) Oral two times a day  sodium bicarbonate 1300 milliGRAM(s) Oral three times a day  ticagrelor 90 milliGRAM(s) Oral two times a day  torsemide 40 milliGRAM(s) Oral daily  urea Oral Powder 15 Gram(s) Oral daily    MEDICATIONS  (PRN):  acetaminophen     Tablet .. 650 milliGRAM(s) Oral every 6 hours PRN Temp greater or equal to 38C (100.4F), Mild Pain (1 - 3)  aluminum hydroxide/magnesium hydroxide/simethicone Suspension 30 milliLiter(s) Oral every 4 hours PRN Dyspepsia  dextrose Oral Gel 15 Gram(s) Oral once PRN Blood Glucose LESS THAN 70 milliGRAM(s)/deciliter  melatonin 3 milliGRAM(s) Oral at bedtime PRN Insomnia  ondansetron Injectable 4 milliGRAM(s) IV Push every 8 hours PRN Nausea and/or Vomiting  oxyCODONE    IR 5 milliGRAM(s) Oral every 6 hours PRN moderate to severe pain      Allergies    No Known Allergies    Intolerances        Vital Signs Last 24 Hrs  T(C): 36.9 (29 Mar 2025 05:00), Max: 37.2 (28 Mar 2025 20:59)  T(F): 98.4 (29 Mar 2025 05:00), Max: 99 (28 Mar 2025 20:59)  HR: 67 (29 Mar 2025 05:00) (58 - 78)  BP: 119/61 (29 Mar 2025 05:00) (119/61 - 150/64)  BP(mean): --  RR: 18 (29 Mar 2025 05:00) (17 - 18)  SpO2: 96% (29 Mar 2025 05:00) (93% - 99%)    Parameters below as of 29 Mar 2025 05:00  Patient On (Oxygen Delivery Method): room air      Daily     Daily Weight in k.6 (28 Mar 2025 16:33)  I&O's Detail    28 Mar 2025 07:01  -  29 Mar 2025 07:00  --------------------------------------------------------  IN:    Oral Fluid: 1200 mL  Total IN: 1200 mL    OUT:    Indwelling Catheter - Urethral (mL): 2370 mL  Total OUT: 2370 mL    Total NET: -1170 mL        I&O's Summary    28 Mar 2025 07:01  -  29 Mar 2025 07:00  --------------------------------------------------------  IN: 1200 mL / OUT: 2370 mL / NET: -1170 mL        PHYSICAL EXAM:  GENERAL: Debilitated  HEENT: No periorbital edema  NECK: Supple, No JVD  NERVOUS SYSTEM: Awake, alert  CHEST: Clear, diminished BS  HEART:  RRR, No rub  ABDOMEN: Soft,  BS+  EXTREMITIES: Tr LE edema  SKIN: No rash  : Schroeder    LABS:                        9.1    10.07 )-----------( 443      ( 29 Mar 2025 06:53 )             26.4         131[L]  |  93[L]  |  89.2[H]  ----------------------------<  195[H]  4.5   |  23.0  |  3.05[H]    Ca    8.0[L]      29 Mar 2025 06:53  Phos  4.1       Mg     2.1         TPro  5.8[L]  /  Alb  2.0[L]  /  TBili  0.8  /  DBili  x   /  AST  34[H]  /  ALT  17  /  AlkPhos  216[H]        Urinalysis Basic - ( 29 Mar 2025 06:53 )    Color: x / Appearance: x / SG: x / pH: x  Gluc: 195 mg/dL / Ketone: x  / Bili: x / Urobili: x   Blood: x / Protein: x / Nitrite: x   Leuk Esterase: x / RBC: x / WBC x   Sq Epi: x / Non Sq Epi: x / Bacteria: x      Magnesium: 2.1 mg/dL ( @ 06:53)  Phosphorus: 4.1 mg/dL ( @ 06:53)          RADIOLOGY & ADDITIONAL TESTS:

## 2025-03-29 NOTE — PROGRESS NOTE ADULT - ASSESSMENT
74 year old female with Hypertension, Diabetes, Coronary artery disease s/p PCI, Heart failure with reduced EF, Mobitz Type II s/p Micra PPM, CKD3, Anemia, Stroke and PAD presented with dyspnea and admitted for NSTEMI. She was heparinized and subsequently underwent PCI to RCA ISR. Hospital stay significant for  ESBL E. coli Pyelonephritis and bacteremia, BRADY and multiple electrolyte abnormalities, Encephalopathy. Temporary requirement for midodrine. Bacteremia cleared (3/25/25). BRADY improving.    # ESBL E. coli Sepsis  # ESBL E. coli Bacteremia  # ESBL E. coli Pyelonephritis  Cleared as of 3/25/25  Still febrile. No longer on Midodrine  - IV antibiotics (Ertapenem) through 3/30/25  - Repeat CT AP wihtout acute findings, noted with renal cyst -- outpt f/u  - doppler neg for DVT  - ID follow up    # NSTEMI  # CAD s/p PCI  - monitor  - DAPT, statin, BB  - Ranolazine  - Cardiology follow up    # Ischemic Cardiomyopathy   # Acute on chronic systolic heart failure  EF 25-50%  - Strict intake, output; Fluid and salt restriction  - sp IV lasix, now to torsemide   - Gradually add GDMT as BP and renal function allow    # BRADY on CKD3  # Hyponatremia  Improving.  - Avoid further potential nephrotoxins  - NaHCO3  - UreNa  - PO fluid restriction and PO Urea per renal  - monitor renal function, na  - TOV pending 3/29    # Type 2 Diabetes  # Neuropathy  A1c 8.5  Mild hyperglycemia  - Glargine 26U  - Add premeal once stable oral intake established  - Blood glucose monitoring with sliding scale Lispro  - Gabapentin    # Anemia (multifactorial - chronic disease, inflammation)  - RONDA, IV Iron  - monitor Hgb    VTE Prophylaxis - Intermittent pneumatic compression  Incentive Spirometry  Mobilize OOB with PT    Disposition - pending ID clearance, abx completion and TOV  Pt wants to go home

## 2025-03-29 NOTE — PROGRESS NOTE ADULT - ASSESSMENT
CKD(III): baseline Screat 1.3- 1.8  BRADY, ATN post cardiac cath on 03/22 PCI to RCA with ZEINAB and hypotensive event  Hypervolemic hyponatremia  MA  CT abd noted:  No hydronephrosis - Enlarged edematous L kidney w increased perinephric fat stranding- Likely Pyelonephritis   ESBL Bacteremia  - cont to avoid further potential nephrotoxins  - cont IV antibiotics , ID follow up noted   - Creat up today , but pt with elevated Cardio MEMS readings   - De-escalated from IV lasix to oral Torsemide , beginning today - Follow MEMS readings   - Cardio follow up noted     Hyponatremia   - cont PO fluid restriction and PO Urea , Oral Bicarb , diurese    MA - Oral Bicarb     Anemia: +CKD + low Fe  - cont IV Fe  - added RONDA    RO: secondary HPT  - cont Rocaltrol  - added CaCO3

## 2025-03-29 NOTE — PROGRESS NOTE ADULT - SUBJECTIVE AND OBJECTIVE BOX
Laura Sampson M.D.    Patient is a 74y old  Female who presents with a chief complaint of Non-ST elevation myocardial infarction (NSTEMI)     (28 Mar 2025 13:05)      SUBJECTIVE / OVERNIGHT EVENTS: Feels well.     Patient denies chest pain, SOB, abd pain, N/V, fever, chills, dysuria or any other complaints. All remainder ROS negative.     MEDICATIONS  (STANDING):  albuterol/ipratropium for Nebulization 3 milliLiter(s) Nebulizer every 6 hours  aspirin enteric coated 81 milliGRAM(s) Oral daily  atorvastatin 80 milliGRAM(s) Oral at bedtime  calcitriol   Capsule 0.25 MICROGram(s) Oral daily  calcium carbonate   1250 mG (OsCal) 1 Tablet(s) Oral three times a day  chlorhexidine 2% Cloths 1 Application(s) Topical daily  dextrose 5%. 1000 milliLiter(s) (100 mL/Hr) IV Continuous <Continuous>  dextrose 5%. 1000 milliLiter(s) (50 mL/Hr) IV Continuous <Continuous>  dextrose 50% Injectable 25 Gram(s) IV Push once  dextrose 50% Injectable 12.5 Gram(s) IV Push once  dextrose 50% Injectable 25 Gram(s) IV Push once  epoetin kristina-epbx (RETACRIT) Injectable 19095 Unit(s) SubCutaneous <User Schedule>  ertapenem  IVPB 500 milliGRAM(s) IV Intermittent every 24 hours  ferrous    sulfate 325 milliGRAM(s) Oral daily  gabapentin 400 milliGRAM(s) Oral at bedtime  glucagon  Injectable 1 milliGRAM(s) IntraMuscular once  hydrALAZINE 10 milliGRAM(s) Oral three times a day  HYDROmorphone  Injectable 0.5 milliGRAM(s) IV Push once  insulin glargine Injectable (LANTUS) 26 Unit(s) SubCutaneous at bedtime  insulin lispro (ADMELOG) corrective regimen sliding scale   SubCutaneous three times a day before meals  iron sucrose IVPB 200 milliGRAM(s) IV Intermittent every 24 hours  metoprolol succinate ER 50 milliGRAM(s) Oral at bedtime  pantoprazole    Tablet 40 milliGRAM(s) Oral before breakfast  ranolazine 500 milliGRAM(s) Oral two times a day  sodium bicarbonate 1300 milliGRAM(s) Oral three times a day  tamsulosin 0.4 milliGRAM(s) Oral daily  ticagrelor 90 milliGRAM(s) Oral two times a day  torsemide 40 milliGRAM(s) Oral daily  urea Oral Powder 15 Gram(s) Oral daily    MEDICATIONS  (PRN):  acetaminophen     Tablet .. 650 milliGRAM(s) Oral every 6 hours PRN Temp greater or equal to 38C (100.4F), Mild Pain (1 - 3)  aluminum hydroxide/magnesium hydroxide/simethicone Suspension 30 milliLiter(s) Oral every 4 hours PRN Dyspepsia  dextrose Oral Gel 15 Gram(s) Oral once PRN Blood Glucose LESS THAN 70 milliGRAM(s)/deciliter  melatonin 3 milliGRAM(s) Oral at bedtime PRN Insomnia  ondansetron Injectable 4 milliGRAM(s) IV Push every 8 hours PRN Nausea and/or Vomiting  oxyCODONE    IR 5 milliGRAM(s) Oral every 6 hours PRN moderate to severe pain      I&O's Summary    28 Mar 2025 07:01  -  29 Mar 2025 07:00  --------------------------------------------------------  IN: 1200 mL / OUT: 2370 mL / NET: -1170 mL    29 Mar 2025 07:01  -  29 Mar 2025 12:33  --------------------------------------------------------  IN: 360 mL / OUT: 550 mL / NET: -190 mL        PHYSICAL EXAM:  Vital Signs Last 24 Hrs  T(C): 36.7 (29 Mar 2025 11:56), Max: 37.2 (28 Mar 2025 20:59)  T(F): 98 (29 Mar 2025 11:56), Max: 99 (28 Mar 2025 20:59)  HR: 61 (29 Mar 2025 11:56) (58 - 75)  BP: 153/73 (29 Mar 2025 11:56) (115/63 - 153/73)  BP(mean): --  RR: 16 (29 Mar 2025 11:56) (16 - 18)  SpO2: 96% (29 Mar 2025 11:56) (93% - 99%)    Parameters below as of 29 Mar 2025 11:56  Patient On (Oxygen Delivery Method): room air    CONSTITUTIONAL: NAD, well-groomed  RESPIRATORY: Normal respiratory effort; no increased WOB  CARDIOVASCULAR: Regular rate and rhythm, no LE edema  ABDOMEN: Nontender to palpation, normoactive bowel sounds. + Sierra    LABS:                        9.1    10.07 )-----------( 443      ( 29 Mar 2025 06:53 )             26.4     03-29    131[L]  |  93[L]  |  89.2[H]  ----------------------------<  195[H]  4.5   |  23.0  |  3.05[H]    Ca    8.0[L]      29 Mar 2025 06:53  Phos  4.1     03-29  Mg     2.1     03-29    TPro  5.8[L]  /  Alb  2.0[L]  /  TBili  0.8  /  DBili  x   /  AST  34[H]  /  ALT  17  /  AlkPhos  216[H]  03-28          Urinalysis Basic - ( 29 Mar 2025 06:53 )    Color: x / Appearance: x / SG: x / pH: x  Gluc: 195 mg/dL / Ketone: x  / Bili: x / Urobili: x   Blood: x / Protein: x / Nitrite: x   Leuk Esterase: x / RBC: x / WBC x   Sq Epi: x / Non Sq Epi: x / Bacteria: x        Culture - Urine (collected 27 Mar 2025 23:54)  Source: Clean Catch Clean Catch (Midstream)  Final Report (29 Mar 2025 06:17):    No growth    Culture - Blood (collected 26 Mar 2025 20:15)  Source: Blood Blood  Preliminary Report (29 Mar 2025 02:02):    No growth at 48 Hours      CAPILLARY BLOOD GLUCOSE      POCT Blood Glucose.: 237 mg/dL (29 Mar 2025 12:06)  POCT Blood Glucose.: 194 mg/dL (29 Mar 2025 08:08)  POCT Blood Glucose.: 184 mg/dL (28 Mar 2025 21:20)  POCT Blood Glucose.: 157 mg/dL (28 Mar 2025 16:58)      RADIOLOGY & ADDITIONAL TESTS:  Results Reviewed:   Imaging Personally Reviewed:  Electrocardiogram Personally Reviewed:

## 2025-03-30 LAB
ANION GAP SERPL CALC-SCNC: 15 MMOL/L — SIGNIFICANT CHANGE UP (ref 5–17)
BUN SERPL-MCNC: 92.5 MG/DL — HIGH (ref 8–20)
CALCIUM SERPL-MCNC: 8.5 MG/DL — SIGNIFICANT CHANGE UP (ref 8.4–10.5)
CHLORIDE SERPL-SCNC: 91 MMOL/L — LOW (ref 96–108)
CO2 SERPL-SCNC: 24 MMOL/L — SIGNIFICANT CHANGE UP (ref 22–29)
CREAT SERPL-MCNC: 2.96 MG/DL — HIGH (ref 0.5–1.3)
CULTURE RESULTS: SIGNIFICANT CHANGE UP
CULTURE RESULTS: SIGNIFICANT CHANGE UP
EGFR: 16 ML/MIN/1.73M2 — LOW
EGFR: 16 ML/MIN/1.73M2 — LOW
GLUCOSE BLDC GLUCOMTR-MCNC: 174 MG/DL — HIGH (ref 70–99)
GLUCOSE BLDC GLUCOMTR-MCNC: 223 MG/DL — HIGH (ref 70–99)
GLUCOSE BLDC GLUCOMTR-MCNC: 226 MG/DL — HIGH (ref 70–99)
GLUCOSE BLDC GLUCOMTR-MCNC: 249 MG/DL — HIGH (ref 70–99)
GLUCOSE SERPL-MCNC: 225 MG/DL — HIGH (ref 70–99)
HCT VFR BLD CALC: 28.8 % — LOW (ref 34.5–45)
HGB BLD-MCNC: 9.6 G/DL — LOW (ref 11.5–15.5)
MAGNESIUM SERPL-MCNC: 2.2 MG/DL — SIGNIFICANT CHANGE UP (ref 1.6–2.6)
MCHC RBC-ENTMCNC: 28.2 PG — SIGNIFICANT CHANGE UP (ref 27–34)
MCHC RBC-ENTMCNC: 33.3 G/DL — SIGNIFICANT CHANGE UP (ref 32–36)
MCV RBC AUTO: 84.7 FL — SIGNIFICANT CHANGE UP (ref 80–100)
NRBC # BLD AUTO: 0.03 K/UL — HIGH (ref 0–0)
NRBC # FLD: 0.03 K/UL — HIGH (ref 0–0)
NRBC BLD AUTO-RTO: 0 /100 WBCS — SIGNIFICANT CHANGE UP (ref 0–0)
PLATELET # BLD AUTO: 526 K/UL — HIGH (ref 150–400)
PMV BLD: 9.6 FL — SIGNIFICANT CHANGE UP (ref 7–13)
POTASSIUM SERPL-MCNC: 4.5 MMOL/L — SIGNIFICANT CHANGE UP (ref 3.5–5.3)
POTASSIUM SERPL-SCNC: 4.5 MMOL/L — SIGNIFICANT CHANGE UP (ref 3.5–5.3)
RBC # BLD: 3.4 M/UL — LOW (ref 3.8–5.2)
RBC # FLD: 15.5 % — HIGH (ref 10.3–14.5)
SODIUM SERPL-SCNC: 130 MMOL/L — LOW (ref 135–145)
SPECIMEN SOURCE: SIGNIFICANT CHANGE UP
SPECIMEN SOURCE: SIGNIFICANT CHANGE UP
WBC # BLD: 11.46 K/UL — HIGH (ref 3.8–10.5)
WBC # FLD AUTO: 11.46 K/UL — HIGH (ref 3.8–10.5)

## 2025-03-30 PROCEDURE — 99232 SBSQ HOSP IP/OBS MODERATE 35: CPT

## 2025-03-30 PROCEDURE — 76937 US GUIDE VASCULAR ACCESS: CPT | Mod: 26,59

## 2025-03-30 PROCEDURE — 36569 INSJ PICC 5 YR+ W/O IMAGING: CPT

## 2025-03-30 RX ORDER — ERTAPENEM SODIUM 1 G/1
500 INJECTION, POWDER, LYOPHILIZED, FOR SOLUTION INTRAMUSCULAR; INTRAVENOUS EVERY 24 HOURS
Refills: 0 | Status: DISCONTINUED | OUTPATIENT
Start: 2025-03-30 | End: 2025-03-31

## 2025-03-30 RX ORDER — GABAPENTIN 400 MG/1
100 CAPSULE ORAL
Refills: 0 | Status: DISCONTINUED | OUTPATIENT
Start: 2025-03-30 | End: 2025-03-31

## 2025-03-30 RX ADMIN — INSULIN GLARGINE-YFGN 26 UNIT(S): 100 INJECTION, SOLUTION SUBCUTANEOUS at 21:41

## 2025-03-30 RX ADMIN — RANOLAZINE 500 MILLIGRAM(S): 1000 TABLET, FILM COATED, EXTENDED RELEASE ORAL at 05:50

## 2025-03-30 RX ADMIN — ATORVASTATIN CALCIUM 80 MILLIGRAM(S): 80 TABLET, FILM COATED ORAL at 21:40

## 2025-03-30 RX ADMIN — Medication 10 MILLIGRAM(S): at 21:41

## 2025-03-30 RX ADMIN — IPRATROPIUM BROMIDE AND ALBUTEROL SULFATE 3 MILLILITER(S): .5; 2.5 SOLUTION RESPIRATORY (INHALATION) at 20:19

## 2025-03-30 RX ADMIN — IPRATROPIUM BROMIDE AND ALBUTEROL SULFATE 3 MILLILITER(S): .5; 2.5 SOLUTION RESPIRATORY (INHALATION) at 08:20

## 2025-03-30 RX ADMIN — TICAGRELOR 90 MILLIGRAM(S): 90 TABLET ORAL at 17:54

## 2025-03-30 RX ADMIN — Medication 10 MILLIGRAM(S): at 14:39

## 2025-03-30 RX ADMIN — Medication 1300 MILLIGRAM(S): at 14:38

## 2025-03-30 RX ADMIN — GABAPENTIN 100 MILLIGRAM(S): 400 CAPSULE ORAL at 12:35

## 2025-03-30 RX ADMIN — Medication 650 MILLIGRAM(S): at 05:51

## 2025-03-30 RX ADMIN — Medication 15 GRAM(S): at 12:35

## 2025-03-30 RX ADMIN — Medication 650 MILLIGRAM(S): at 22:40

## 2025-03-30 RX ADMIN — CALCIUM CARBONATE 1 TABLET(S): 750 TABLET ORAL at 14:38

## 2025-03-30 RX ADMIN — ERTAPENEM SODIUM 100 MILLIGRAM(S): 1 INJECTION, POWDER, LYOPHILIZED, FOR SOLUTION INTRAMUSCULAR; INTRAVENOUS at 12:34

## 2025-03-30 RX ADMIN — CALCITRIOL 0.25 MICROGRAM(S): 0.5 CAPSULE, GELATIN COATED ORAL at 12:35

## 2025-03-30 RX ADMIN — TAMSULOSIN HYDROCHLORIDE 0.4 MILLIGRAM(S): 0.4 CAPSULE ORAL at 12:35

## 2025-03-30 RX ADMIN — Medication 1300 MILLIGRAM(S): at 21:40

## 2025-03-30 RX ADMIN — Medication 40 MILLIGRAM(S): at 05:50

## 2025-03-30 RX ADMIN — Medication 650 MILLIGRAM(S): at 06:51

## 2025-03-30 RX ADMIN — RANOLAZINE 500 MILLIGRAM(S): 1000 TABLET, FILM COATED, EXTENDED RELEASE ORAL at 17:54

## 2025-03-30 RX ADMIN — METOPROLOL SUCCINATE 50 MILLIGRAM(S): 50 TABLET, EXTENDED RELEASE ORAL at 21:40

## 2025-03-30 RX ADMIN — CALCIUM CARBONATE 1 TABLET(S): 750 TABLET ORAL at 21:40

## 2025-03-30 RX ADMIN — ERTAPENEM SODIUM 100 MILLIGRAM(S): 1 INJECTION, POWDER, LYOPHILIZED, FOR SOLUTION INTRAMUSCULAR; INTRAVENOUS at 09:54

## 2025-03-30 RX ADMIN — Medication 10 MILLIGRAM(S): at 05:51

## 2025-03-30 RX ADMIN — TICAGRELOR 90 MILLIGRAM(S): 90 TABLET ORAL at 05:51

## 2025-03-30 RX ADMIN — Medication 1300 MILLIGRAM(S): at 05:50

## 2025-03-30 RX ADMIN — Medication 650 MILLIGRAM(S): at 21:40

## 2025-03-30 RX ADMIN — INSULIN LISPRO 2: 100 INJECTION, SOLUTION INTRAVENOUS; SUBCUTANEOUS at 12:36

## 2025-03-30 RX ADMIN — Medication 1 APPLICATION(S): at 14:39

## 2025-03-30 RX ADMIN — INSULIN LISPRO 4: 100 INJECTION, SOLUTION INTRAVENOUS; SUBCUTANEOUS at 17:58

## 2025-03-30 RX ADMIN — CALCIUM CARBONATE 1 TABLET(S): 750 TABLET ORAL at 05:51

## 2025-03-30 RX ADMIN — IPRATROPIUM BROMIDE AND ALBUTEROL SULFATE 3 MILLILITER(S): .5; 2.5 SOLUTION RESPIRATORY (INHALATION) at 14:27

## 2025-03-30 RX ADMIN — GABAPENTIN 400 MILLIGRAM(S): 400 CAPSULE ORAL at 21:40

## 2025-03-30 RX ADMIN — INSULIN LISPRO 4: 100 INJECTION, SOLUTION INTRAVENOUS; SUBCUTANEOUS at 09:01

## 2025-03-30 RX ADMIN — IPRATROPIUM BROMIDE AND ALBUTEROL SULFATE 3 MILLILITER(S): .5; 2.5 SOLUTION RESPIRATORY (INHALATION) at 02:08

## 2025-03-30 RX ADMIN — Medication 81 MILLIGRAM(S): at 12:34

## 2025-03-30 RX ADMIN — GABAPENTIN 100 MILLIGRAM(S): 400 CAPSULE ORAL at 17:54

## 2025-03-30 RX ADMIN — Medication 325 MILLIGRAM(S): at 12:35

## 2025-03-30 NOTE — PROGRESS NOTE ADULT - PROBLEM SELECTOR PROBLEM 2
Acute on chronic HFrEF (heart failure with reduced ejection fraction)
Non-ST elevation MI (NSTEMI)
UTI due to extended-spectrum beta lactamase (ESBL) producing Escherichia coli
Acute on chronic HFrEF (heart failure with reduced ejection fraction)
Non-ST elevation MI (NSTEMI)
Acute on chronic HFrEF (heart failure with reduced ejection fraction)
Non-ST elevation MI (NSTEMI)
UTI due to extended-spectrum beta lactamase (ESBL) producing Escherichia coli
Non-ST elevation MI (NSTEMI)
UTI due to extended-spectrum beta lactamase (ESBL) producing Escherichia coli

## 2025-03-30 NOTE — PROGRESS NOTE ADULT - ASSESSMENT
74 year old female with Hypertension, Diabetes, Coronary artery disease s/p PCI, Heart failure with reduced EF, Mobitz Type II s/p Micra PPM, CKD3, Anemia, Stroke and PAD presented with dyspnea and admitted for NSTEMI. She was heparinized and subsequently underwent PCI to RCA ISR. Hospital stay significant for  ESBL E. coli Pyelonephritis and bacteremia, BRADY and multiple electrolyte abnormalities, Encephalopathy. Temporary requirement for midodrine. Bacteremia cleared (3/25/25). BRADY improving.      CKD(III): baseline Screat 1.3- 1.8  BRADY, ATN post cardiac cath on 03/22 PCI to RCA with ZEINAB and hypotensive event  CT abd noted:  No hydronephrosis - Enlarged edematous L kidney w increased perinephric fat stranding- Likely Pyelonephritis   ESBL Bacteremia  - cont to avoid further potential nephrotoxins  - cont IV antibiotics , ID follow up noted   - Elevated Cardio MEMS readings   - De-escalated from IV lasix to oral Torsemide ,  - Follow MEMS readings   - Cardio follow up noted     Hyponatremia - Hypervolemic  - cont PO fluid restriction and PO Urea , Oral Bicarb , diurese    MA - Oral Bicarb added     Anemia: +CKD + low Fe  -  IV Fe  - added RONDA    RO: secondary HPT  - cont Rocaltrol  - added CaCO3 74 year old female with Hypertension, Diabetes, Coronary artery disease s/p PCI, Heart failure with reduced EF, Mobitz Type II s/p Micra PPM, CKD3, Anemia, Stroke and PAD presented with dyspnea and admitted for NSTEMI. She was heparinized and subsequently underwent PCI to RCA ISR. Hospital stay significant for  ESBL E. coli Pyelonephritis and bacteremia, BRADY and multiple electrolyte abnormalities, Encephalopathy. Temporary requirement for midodrine. Bacteremia cleared (3/25/25). BRADY improving.      CKD(III): baseline Screat 1.3- 1.8  BRADY, ATN post cardiac cath on 03/22 PCI to RCA with ZEINAB and hypotensive event  CT abd noted:  No hydronephrosis - Enlarged edematous L kidney w increased perinephric fat stranding- Likely Pyelonephritis   ESBL Bacteremia  - cont to avoid further potential nephrotoxins  - cont IV antibiotics , ID follow up noted   - Elevated Cardio MEMS readings   - De-escalated from IV lasix to oral Torsemide ,  - Follow MEMS readings   - Cardio follow up noted   - Sierra out , follow bladder scan     Hyponatremia - Hypervolemic  - cont PO fluid restriction and PO Urea , Oral Bicarb , diurese    MA - Oral Bicarb added     Anemia: +CKD + low Fe  -  IV Fe  - added RONDA    RO: secondary HPT  - cont Rocaltrol  - added CaCO3

## 2025-03-30 NOTE — PROGRESS NOTE ADULT - PROBLEM SELECTOR PROBLEM 6
Type 2 diabetes mellitus with diabetic neuropathy

## 2025-03-30 NOTE — PROGRESS NOTE ADULT - ASSESSMENT
74 year old female with Hypertension, Diabetes, Coronary artery disease s/p PCI, Heart failure with reduced EF, Mobitz Type II s/p Micra PPM, CKD3, Anemia, Stroke and PAD presented with dyspnea and admitted for NSTEMI. She was heparinized and subsequently underwent PCI to RCA ISR. Hospital stay significant for  ESBL E. coli Pyelonephritis and bacteremia, BRADY and multiple electrolyte abnormalities, Encephalopathy. Temporary requirement for midodrine. Bacteremia cleared (3/25/25). BRADY improving.    # ESBL E. coli Sepsis  # ESBL E. coli Bacteremia  # ESBL E. coli Pyelonephritis  Cleared as of 3/25/25  Still febrile. No longer on Midodrine  - IV antibiotic Ertapenem per ID, dw Dr. Baldwin, usually plan for 10 days of abx  - Repeat CT AP wihtout acute findings, noted with renal cyst -- outpt f/u  - doppler neg for DVT  - ID follow up    # NSTEMI  # CAD s/p PCI  - monitor  - DAPT, statin, BB  - Ranolazine  - Cardiology follow up    # Ischemic Cardiomyopathy   # Acute on chronic systolic heart failure  EF 25-50%  - Strict intake, output; Fluid and salt restriction  - sp IV lasix, now to torsemide   - Gradually add GDMT as BP and renal function allow    # BRADY on CKD3  # Hyponatremia  Improving.  - Avoid further potential nephrotoxins  - NaHCO3  - UreNa  - PO fluid restriction and PO Urea per renal  - monitor renal function, na  - TOV passed 3/29    # Type 2 Diabetes  # Neuropathy  A1c 8.5  Mild hyperglycemia  - Glargine 26U  - Add premeal once stable oral intake established  - Blood glucose monitoring with sliding scale Lispro  - Gabapentin QHS, add gabapentin BID    # Anemia (multifactorial - chronic disease, inflammation)  - RONDA, IV Iron  - monitor Hgb    VTE Prophylaxis - Intermittent pneumatic compression  Disposition - pending ID clearance, and PT eval  Pt wants to go home

## 2025-03-30 NOTE — PROGRESS NOTE ADULT - PROBLEM SELECTOR PROBLEM 4
Bacteremia
Anemia of chronic disease
Bacteremia

## 2025-03-30 NOTE — PROCEDURE NOTE - NSPROCDETAILS_GEN_ALL_CORE
W/ ULTRASOUND/blood seen on insertion/flushes easily/secured in place/sterile technique, catheter placed
location identified, draped/prepped, sterile technique used/blood seen on insertion/dressing applied/flushes easily/secured in place
sterile technique, indwelling urinary device inserted

## 2025-03-30 NOTE — PROGRESS NOTE ADULT - PROBLEM SELECTOR PROBLEM 1
Acute on chronic HFrEF (heart failure with reduced ejection fraction)
Non-ST elevation MI (NSTEMI)
Non-ST elevation MI (NSTEMI)
Acute on chronic HFrEF (heart failure with reduced ejection fraction)
Acute on chronic HFrEF (heart failure with reduced ejection fraction)
Non-ST elevation MI (NSTEMI)
Acute on chronic HFrEF (heart failure with reduced ejection fraction)
Non-ST elevation MI (NSTEMI)

## 2025-03-30 NOTE — PROGRESS NOTE ADULT - SUBJECTIVE AND OBJECTIVE BOX
NEPHROLOGY INTERVAL HPI/OVERNIGHT EVENTS:    No new events   Meds the same   No SOB or CP     MEDICATIONS  (STANDING):  albuterol/ipratropium for Nebulization 3 milliLiter(s) Nebulizer every 6 hours  aspirin enteric coated 81 milliGRAM(s) Oral daily  atorvastatin 80 milliGRAM(s) Oral at bedtime  calcitriol   Capsule 0.25 MICROGram(s) Oral daily  calcium carbonate   1250 mG (OsCal) 1 Tablet(s) Oral three times a day  chlorhexidine 2% Cloths 1 Application(s) Topical daily  dextrose 5%. 1000 milliLiter(s) (100 mL/Hr) IV Continuous <Continuous>  dextrose 5%. 1000 milliLiter(s) (50 mL/Hr) IV Continuous <Continuous>  dextrose 50% Injectable 25 Gram(s) IV Push once  dextrose 50% Injectable 12.5 Gram(s) IV Push once  dextrose 50% Injectable 25 Gram(s) IV Push once  epoetin kristina-epbx (RETACRIT) Injectable 42302 Unit(s) SubCutaneous <User Schedule>  ertapenem  IVPB 500 milliGRAM(s) IV Intermittent every 24 hours  ferrous    sulfate 325 milliGRAM(s) Oral daily  gabapentin 400 milliGRAM(s) Oral at bedtime  glucagon  Injectable 1 milliGRAM(s) IntraMuscular once  hydrALAZINE 10 milliGRAM(s) Oral three times a day  HYDROmorphone  Injectable 0.5 milliGRAM(s) IV Push once  insulin glargine Injectable (LANTUS) 26 Unit(s) SubCutaneous at bedtime  insulin lispro (ADMELOG) corrective regimen sliding scale   SubCutaneous three times a day before meals  metoprolol succinate ER 50 milliGRAM(s) Oral at bedtime  pantoprazole    Tablet 40 milliGRAM(s) Oral before breakfast  ranolazine 500 milliGRAM(s) Oral two times a day  sodium bicarbonate 1300 milliGRAM(s) Oral three times a day  tamsulosin 0.4 milliGRAM(s) Oral daily  ticagrelor 90 milliGRAM(s) Oral two times a day  torsemide 40 milliGRAM(s) Oral daily  urea Oral Powder 15 Gram(s) Oral daily    MEDICATIONS  (PRN):  acetaminophen     Tablet .. 650 milliGRAM(s) Oral every 6 hours PRN Temp greater or equal to 38C (100.4F), Mild Pain (1 - 3)  aluminum hydroxide/magnesium hydroxide/simethicone Suspension 30 milliLiter(s) Oral every 4 hours PRN Dyspepsia  dextrose Oral Gel 15 Gram(s) Oral once PRN Blood Glucose LESS THAN 70 milliGRAM(s)/deciliter  melatonin 3 milliGRAM(s) Oral at bedtime PRN Insomnia  ondansetron Injectable 4 milliGRAM(s) IV Push every 8 hours PRN Nausea and/or Vomiting  oxyCODONE    IR 5 milliGRAM(s) Oral every 6 hours PRN moderate to severe pain      Allergies    No Known Allergies    Intolerances                Vital Signs Last 24 Hrs  T(C): 36.9 (30 Mar 2025 07:40), Max: 36.9 (30 Mar 2025 05:00)  T(F): 98.5 (30 Mar 2025 07:40), Max: 98.5 (30 Mar 2025 07:40)  HR: 64 (30 Mar 2025 07:40) (59 - 72)  BP: 114/66 (30 Mar 2025 07:40) (113/62 - 153/73)  BP(mean): --  RR: 18 (30 Mar 2025 07:40) (16 - 18)  SpO2: 96% (30 Mar 2025 07:40) (92% - 100%)    Parameters below as of 30 Mar 2025 07:40  Patient On (Oxygen Delivery Method): room air      Daily     Daily Weight in k.2 (29 Mar 2025 17:38)  I&O's Detail    29 Mar 2025 07:01  -  30 Mar 2025 07:00  --------------------------------------------------------  IN:    Oral Fluid: 960 mL  Total IN: 960 mL    OUT:    Indwelling Catheter - Urethral (mL): 550 mL  Total OUT: 550 mL    Total NET: 410 mL        I&O's Summary    29 Mar 2025 07:01  -  30 Mar 2025 07:00  --------------------------------------------------------  IN: 960 mL / OUT: 550 mL / NET: 410 mL        PHYSICAL EXAM:  GENERAL: Debilitated  HEENT: No periorbital edema  NECK: Supple, No JVD  NERVOUS SYSTEM: Awake, alert  CHEST: Clear, diminished BS  HEART:  RRR, No rub  ABDOMEN: Soft,  BS+  EXTREMITIES: Tr LE edema  SKIN: No rash  : Sierra    LABS:                        9.6    11.46 )-----------( 526      ( 30 Mar 2025 05:05 )             28.8     03-30    130[L]  |  91[L]  |  92.5[H]  ----------------------------<  225[H]  4.5   |  24.0  |  2.96[H]    Ca    8.5      30 Mar 2025 05:05  Phos  4.1     03-  Mg     2.2             Urinalysis Basic - ( 30 Mar 2025 05:05 )    Color: x / Appearance: x / SG: x / pH: x  Gluc: 225 mg/dL / Ketone: x  / Bili: x / Urobili: x   Blood: x / Protein: x / Nitrite: x   Leuk Esterase: x / RBC: x / WBC x   Sq Epi: x / Non Sq Epi: x / Bacteria: x      Magnesium: 2.2 mg/dL ( @ 05:05)          RADIOLOGY & ADDITIONAL TESTS:   NEPHROLOGY INTERVAL HPI/OVERNIGHT EVENTS:    No new events   Meds the same   No SOB or CP   Sierra out     MEDICATIONS  (STANDING):  albuterol/ipratropium for Nebulization 3 milliLiter(s) Nebulizer every 6 hours  aspirin enteric coated 81 milliGRAM(s) Oral daily  atorvastatin 80 milliGRAM(s) Oral at bedtime  calcitriol   Capsule 0.25 MICROGram(s) Oral daily  calcium carbonate   1250 mG (OsCal) 1 Tablet(s) Oral three times a day  chlorhexidine 2% Cloths 1 Application(s) Topical daily  dextrose 5%. 1000 milliLiter(s) (100 mL/Hr) IV Continuous <Continuous>  dextrose 5%. 1000 milliLiter(s) (50 mL/Hr) IV Continuous <Continuous>  dextrose 50% Injectable 25 Gram(s) IV Push once  dextrose 50% Injectable 12.5 Gram(s) IV Push once  dextrose 50% Injectable 25 Gram(s) IV Push once  epoetin kristina-epbx (RETACRIT) Injectable 79773 Unit(s) SubCutaneous <User Schedule>  ertapenem  IVPB 500 milliGRAM(s) IV Intermittent every 24 hours  ferrous    sulfate 325 milliGRAM(s) Oral daily  gabapentin 400 milliGRAM(s) Oral at bedtime  glucagon  Injectable 1 milliGRAM(s) IntraMuscular once  hydrALAZINE 10 milliGRAM(s) Oral three times a day  HYDROmorphone  Injectable 0.5 milliGRAM(s) IV Push once  insulin glargine Injectable (LANTUS) 26 Unit(s) SubCutaneous at bedtime  insulin lispro (ADMELOG) corrective regimen sliding scale   SubCutaneous three times a day before meals  metoprolol succinate ER 50 milliGRAM(s) Oral at bedtime  pantoprazole    Tablet 40 milliGRAM(s) Oral before breakfast  ranolazine 500 milliGRAM(s) Oral two times a day  sodium bicarbonate 1300 milliGRAM(s) Oral three times a day  tamsulosin 0.4 milliGRAM(s) Oral daily  ticagrelor 90 milliGRAM(s) Oral two times a day  torsemide 40 milliGRAM(s) Oral daily  urea Oral Powder 15 Gram(s) Oral daily    MEDICATIONS  (PRN):  acetaminophen     Tablet .. 650 milliGRAM(s) Oral every 6 hours PRN Temp greater or equal to 38C (100.4F), Mild Pain (1 - 3)  aluminum hydroxide/magnesium hydroxide/simethicone Suspension 30 milliLiter(s) Oral every 4 hours PRN Dyspepsia  dextrose Oral Gel 15 Gram(s) Oral once PRN Blood Glucose LESS THAN 70 milliGRAM(s)/deciliter  melatonin 3 milliGRAM(s) Oral at bedtime PRN Insomnia  ondansetron Injectable 4 milliGRAM(s) IV Push every 8 hours PRN Nausea and/or Vomiting  oxyCODONE    IR 5 milliGRAM(s) Oral every 6 hours PRN moderate to severe pain      Allergies    No Known Allergies    Intolerances                Vital Signs Last 24 Hrs  T(C): 36.9 (30 Mar 2025 07:40), Max: 36.9 (30 Mar 2025 05:00)  T(F): 98.5 (30 Mar 2025 07:40), Max: 98.5 (30 Mar 2025 07:40)  HR: 64 (30 Mar 2025 07:40) (59 - 72)  BP: 114/66 (30 Mar 2025 07:40) (113/62 - 153/73)  BP(mean): --  RR: 18 (30 Mar 2025 07:40) (16 - 18)  SpO2: 96% (30 Mar 2025 07:40) (92% - 100%)    Parameters below as of 30 Mar 2025 07:40  Patient On (Oxygen Delivery Method): room air      Daily     Daily Weight in k.2 (29 Mar 2025 17:38)  I&O's Detail    29 Mar 2025 07:01  -  30 Mar 2025 07:00  --------------------------------------------------------  IN:    Oral Fluid: 960 mL  Total IN: 960 mL    OUT:    Indwelling Catheter - Urethral (mL): 550 mL  Total OUT: 550 mL    Total NET: 410 mL        I&O's Summary    29 Mar 2025 07:01  -  30 Mar 2025 07:00  --------------------------------------------------------  IN: 960 mL / OUT: 550 mL / NET: 410 mL        PHYSICAL EXAM:  GENERAL: Debilitated  HEENT: No periorbital edema  NECK: Supple, No JVD  NERVOUS SYSTEM: Awake, alert  CHEST: Clear, diminished BS  HEART:  RRR, No rub  ABDOMEN: Soft,  BS+  EXTREMITIES: Tr LE edema  SKIN: No rash  : Sierra out     LABS:                        9.6    11.46 )-----------( 526      ( 30 Mar 2025 05:05 )             28.8     03-30    130[L]  |  91[L]  |  92.5[H]  ----------------------------<  225[H]  4.5   |  24.0  |  2.96[H]    Ca    8.5      30 Mar 2025 05:05  Phos  4.1     03-  Mg     2.2     30        Urinalysis Basic - ( 30 Mar 2025 05:05 )    Color: x / Appearance: x / SG: x / pH: x  Gluc: 225 mg/dL / Ketone: x  / Bili: x / Urobili: x   Blood: x / Protein: x / Nitrite: x   Leuk Esterase: x / RBC: x / WBC x   Sq Epi: x / Non Sq Epi: x / Bacteria: x      Magnesium: 2.2 mg/dL ( @ 05:05)          RADIOLOGY & ADDITIONAL TESTS:

## 2025-03-30 NOTE — PROGRESS NOTE ADULT - SUBJECTIVE AND OBJECTIVE BOX
Laura Sampson M.D.    Patient is a 74y old  Female who presents with a chief complaint of Non-ST elevation myocardial infarction (NSTEMI)     (28 Mar 2025 13:05)      SUBJECTIVE / OVERNIGHT EVENTS: reports neuropathic pain in bl legs and weakness.     Patient denies chest pain, SOB, abd pain, N/V, fever, chills, dysuria or any other complaints. All remainder ROS negative.     MEDICATIONS  (STANDING):  albuterol/ipratropium for Nebulization 3 milliLiter(s) Nebulizer every 6 hours  aspirin enteric coated 81 milliGRAM(s) Oral daily  atorvastatin 80 milliGRAM(s) Oral at bedtime  calcitriol   Capsule 0.25 MICROGram(s) Oral daily  calcium carbonate   1250 mG (OsCal) 1 Tablet(s) Oral three times a day  chlorhexidine 2% Cloths 1 Application(s) Topical daily  dextrose 5%. 1000 milliLiter(s) (100 mL/Hr) IV Continuous <Continuous>  dextrose 5%. 1000 milliLiter(s) (50 mL/Hr) IV Continuous <Continuous>  dextrose 50% Injectable 25 Gram(s) IV Push once  dextrose 50% Injectable 12.5 Gram(s) IV Push once  dextrose 50% Injectable 25 Gram(s) IV Push once  epoetin kristina-epbx (RETACRIT) Injectable 70498 Unit(s) SubCutaneous <User Schedule>  ferrous    sulfate 325 milliGRAM(s) Oral daily  gabapentin 100 milliGRAM(s) Oral two times a day  gabapentin 400 milliGRAM(s) Oral at bedtime  glucagon  Injectable 1 milliGRAM(s) IntraMuscular once  hydrALAZINE 10 milliGRAM(s) Oral three times a day  HYDROmorphone  Injectable 0.5 milliGRAM(s) IV Push once  insulin glargine Injectable (LANTUS) 26 Unit(s) SubCutaneous at bedtime  insulin lispro (ADMELOG) corrective regimen sliding scale   SubCutaneous three times a day before meals  metoprolol succinate ER 50 milliGRAM(s) Oral at bedtime  pantoprazole    Tablet 40 milliGRAM(s) Oral before breakfast  ranolazine 500 milliGRAM(s) Oral two times a day  sodium bicarbonate 1300 milliGRAM(s) Oral three times a day  tamsulosin 0.4 milliGRAM(s) Oral daily  ticagrelor 90 milliGRAM(s) Oral two times a day  torsemide 40 milliGRAM(s) Oral daily  urea Oral Powder 15 Gram(s) Oral daily    MEDICATIONS  (PRN):  acetaminophen     Tablet .. 650 milliGRAM(s) Oral every 6 hours PRN Temp greater or equal to 38C (100.4F), Mild Pain (1 - 3)  aluminum hydroxide/magnesium hydroxide/simethicone Suspension 30 milliLiter(s) Oral every 4 hours PRN Dyspepsia  dextrose Oral Gel 15 Gram(s) Oral once PRN Blood Glucose LESS THAN 70 milliGRAM(s)/deciliter  melatonin 3 milliGRAM(s) Oral at bedtime PRN Insomnia  ondansetron Injectable 4 milliGRAM(s) IV Push every 8 hours PRN Nausea and/or Vomiting  oxyCODONE    IR 5 milliGRAM(s) Oral every 6 hours PRN moderate to severe pain      I&O's Summary    29 Mar 2025 07:01  -  30 Mar 2025 07:00  --------------------------------------------------------  IN: 960 mL / OUT: 550 mL / NET: 410 mL        PHYSICAL EXAM:  Vital Signs Last 24 Hrs  T(C): 36.9 (30 Mar 2025 07:40), Max: 36.9 (30 Mar 2025 05:00)  T(F): 98.5 (30 Mar 2025 07:40), Max: 98.5 (30 Mar 2025 07:40)  HR: 71 (30 Mar 2025 08:20) (59 - 71)  BP: 114/66 (30 Mar 2025 07:40) (113/62 - 153/73)  BP(mean): --  RR: 18 (30 Mar 2025 07:40) (16 - 18)  SpO2: 100% (30 Mar 2025 08:20) (92% - 100%)    Parameters below as of 30 Mar 2025 08:20  Patient On (Oxygen Delivery Method): room air    CONSTITUTIONAL: NAD, well-groomed  RESPIRATORY: Normal respiratory effort; no increased WOB  CARDIOVASCULAR: Regular rate and rhythm, no LE edema  ABDOMEN: Nontender to palpation, normoactive bowel sounds    LABS:                        9.6    11.46 )-----------( 526      ( 30 Mar 2025 05:05 )             28.8     03-30    130[L]  |  91[L]  |  92.5[H]  ----------------------------<  225[H]  4.5   |  24.0  |  2.96[H]    Ca    8.5      30 Mar 2025 05:05  Phos  4.1     03-29  Mg     2.2     03-30            Urinalysis Basic - ( 30 Mar 2025 05:05 )    Color: x / Appearance: x / SG: x / pH: x  Gluc: 225 mg/dL / Ketone: x  / Bili: x / Urobili: x   Blood: x / Protein: x / Nitrite: x   Leuk Esterase: x / RBC: x / WBC x   Sq Epi: x / Non Sq Epi: x / Bacteria: x        Culture - Urine (collected 27 Mar 2025 23:54)  Source: Clean Catch Clean Catch (Midstream)  Final Report (29 Mar 2025 06:17):    No growth      CAPILLARY BLOOD GLUCOSE      POCT Blood Glucose.: 226 mg/dL (30 Mar 2025 08:11)  POCT Blood Glucose.: 273 mg/dL (29 Mar 2025 22:00)  POCT Blood Glucose.: 237 mg/dL (29 Mar 2025 17:13)  POCT Blood Glucose.: 237 mg/dL (29 Mar 2025 12:06)      RADIOLOGY & ADDITIONAL TESTS:  Results Reviewed:   Imaging Personally Reviewed:  Electrocardiogram Personally Reviewed:

## 2025-03-30 NOTE — PROCEDURE NOTE - ADDITIONAL PROCEDURE DETAILS
David disposed  siderails up  T&S ordered  Blood consent obtained after informing risks and benefits; pt has received blood transfusions previously w/o adverse rxn.  RN was made aware
Asked by nursing to place IV for 74y.o Female for IV access and IV abx. Nursing tried first multiple times.  #20 g IV placed with utilizing ultrasound guidance. #20 angio inserted left arm, good blood return, secured well, easily flushed with 5cc normal saline flushes multiple times. Nursing aware. Patient tolerated procedure well. Sharps disposed of in a safe manner.
Urology called for difficult schroeder for 74F with urinary retention. Per last bladder US PVR of 346mL. Sterile technique used. Schroeder catheter placed with mild resistance through urethra. Secured with stat lock. Pt tolerated procedure well.

## 2025-03-30 NOTE — PROGRESS NOTE ADULT - PROBLEM SELECTOR PROBLEM 5
Anemia of chronic disease
Ischemic cardiomyopathy
Ischemic cardiomyopathy
Anemia of chronic disease
Ischemic cardiomyopathy

## 2025-03-31 ENCOUNTER — TRANSCRIPTION ENCOUNTER (OUTPATIENT)
Age: 75
End: 2025-03-31

## 2025-03-31 VITALS
HEART RATE: 68 BPM | OXYGEN SATURATION: 98 % | DIASTOLIC BLOOD PRESSURE: 72 MMHG | SYSTOLIC BLOOD PRESSURE: 152 MMHG | RESPIRATION RATE: 18 BRPM

## 2025-03-31 LAB
ANION GAP SERPL CALC-SCNC: 13 MMOL/L — SIGNIFICANT CHANGE UP (ref 5–17)
BUN SERPL-MCNC: 93.2 MG/DL — HIGH (ref 8–20)
CALCIUM SERPL-MCNC: 8.8 MG/DL — SIGNIFICANT CHANGE UP (ref 8.4–10.5)
CHLORIDE SERPL-SCNC: 94 MMOL/L — LOW (ref 96–108)
CO2 SERPL-SCNC: 27 MMOL/L — SIGNIFICANT CHANGE UP (ref 22–29)
CREAT SERPL-MCNC: 2.94 MG/DL — HIGH (ref 0.5–1.3)
EGFR: 16 ML/MIN/1.73M2 — LOW
EGFR: 16 ML/MIN/1.73M2 — LOW
GLUCOSE BLDC GLUCOMTR-MCNC: 114 MG/DL — HIGH (ref 70–99)
GLUCOSE BLDC GLUCOMTR-MCNC: 125 MG/DL — HIGH (ref 70–99)
GLUCOSE SERPL-MCNC: 134 MG/DL — HIGH (ref 70–99)
HCT VFR BLD CALC: 26.8 % — LOW (ref 34.5–45)
HGB BLD-MCNC: 8.7 G/DL — LOW (ref 11.5–15.5)
MAGNESIUM SERPL-MCNC: 2.4 MG/DL — SIGNIFICANT CHANGE UP (ref 1.8–2.6)
MCHC RBC-ENTMCNC: 28.2 PG — SIGNIFICANT CHANGE UP (ref 27–34)
MCHC RBC-ENTMCNC: 32.5 G/DL — SIGNIFICANT CHANGE UP (ref 32–36)
MCV RBC AUTO: 87 FL — SIGNIFICANT CHANGE UP (ref 80–100)
NRBC # BLD AUTO: 0.02 K/UL — HIGH (ref 0–0)
NRBC # FLD: 0.02 K/UL — HIGH (ref 0–0)
NRBC BLD AUTO-RTO: 0 /100 WBCS — SIGNIFICANT CHANGE UP (ref 0–0)
PLATELET # BLD AUTO: 516 K/UL — HIGH (ref 150–400)
PMV BLD: 9.7 FL — SIGNIFICANT CHANGE UP (ref 7–13)
POTASSIUM SERPL-MCNC: 4.1 MMOL/L — SIGNIFICANT CHANGE UP (ref 3.5–5.3)
POTASSIUM SERPL-SCNC: 4.1 MMOL/L — SIGNIFICANT CHANGE UP (ref 3.5–5.3)
RBC # BLD: 3.08 M/UL — LOW (ref 3.8–5.2)
RBC # FLD: 16.2 % — HIGH (ref 10.3–14.5)
SODIUM SERPL-SCNC: 133 MMOL/L — LOW (ref 135–145)
WBC # BLD: 12.11 K/UL — HIGH (ref 3.8–10.5)
WBC # FLD AUTO: 12.11 K/UL — HIGH (ref 3.8–10.5)

## 2025-03-31 PROCEDURE — 87086 URINE CULTURE/COLONY COUNT: CPT

## 2025-03-31 PROCEDURE — 87150 DNA/RNA AMPLIFIED PROBE: CPT

## 2025-03-31 PROCEDURE — P9047: CPT

## 2025-03-31 PROCEDURE — 84439 ASSAY OF FREE THYROXINE: CPT

## 2025-03-31 PROCEDURE — 87077 CULTURE AEROBIC IDENTIFY: CPT

## 2025-03-31 PROCEDURE — 96374 THER/PROPH/DIAG INJ IV PUSH: CPT

## 2025-03-31 PROCEDURE — C1887: CPT

## 2025-03-31 PROCEDURE — 85730 THROMBOPLASTIN TIME PARTIAL: CPT

## 2025-03-31 PROCEDURE — 83735 ASSAY OF MAGNESIUM: CPT

## 2025-03-31 PROCEDURE — 86850 RBC ANTIBODY SCREEN: CPT

## 2025-03-31 PROCEDURE — C9606: CPT | Mod: RC

## 2025-03-31 PROCEDURE — 84100 ASSAY OF PHOSPHORUS: CPT

## 2025-03-31 PROCEDURE — 83550 IRON BINDING TEST: CPT

## 2025-03-31 PROCEDURE — 84466 ASSAY OF TRANSFERRIN: CPT

## 2025-03-31 PROCEDURE — 82728 ASSAY OF FERRITIN: CPT

## 2025-03-31 PROCEDURE — C8929: CPT

## 2025-03-31 PROCEDURE — 84132 ASSAY OF SERUM POTASSIUM: CPT

## 2025-03-31 PROCEDURE — 86900 BLOOD TYPING SEROLOGIC ABO: CPT

## 2025-03-31 PROCEDURE — 84480 ASSAY TRIIODOTHYRONINE (T3): CPT

## 2025-03-31 PROCEDURE — 85576 BLOOD PLATELET AGGREGATION: CPT

## 2025-03-31 PROCEDURE — 82306 VITAMIN D 25 HYDROXY: CPT

## 2025-03-31 PROCEDURE — 84436 ASSAY OF TOTAL THYROXINE: CPT

## 2025-03-31 PROCEDURE — 74176 CT ABD & PELVIS W/O CONTRAST: CPT | Mod: MC

## 2025-03-31 PROCEDURE — 87040 BLOOD CULTURE FOR BACTERIA: CPT

## 2025-03-31 PROCEDURE — 36415 COLL VENOUS BLD VENIPUNCTURE: CPT

## 2025-03-31 PROCEDURE — 80048 BASIC METABOLIC PNL TOTAL CA: CPT

## 2025-03-31 PROCEDURE — 83880 ASSAY OF NATRIURETIC PEPTIDE: CPT

## 2025-03-31 PROCEDURE — 87186 SC STD MICRODIL/AGAR DIL: CPT

## 2025-03-31 PROCEDURE — 84295 ASSAY OF SERUM SODIUM: CPT

## 2025-03-31 PROCEDURE — 94640 AIRWAY INHALATION TREATMENT: CPT

## 2025-03-31 PROCEDURE — C1725: CPT

## 2025-03-31 PROCEDURE — 80053 COMPREHEN METABOLIC PANEL: CPT

## 2025-03-31 PROCEDURE — 36430 TRANSFUSION BLD/BLD COMPNT: CPT

## 2025-03-31 PROCEDURE — 93970 EXTREMITY STUDY: CPT

## 2025-03-31 PROCEDURE — 93458 L HRT ARTERY/VENTRICLE ANGIO: CPT | Mod: 59

## 2025-03-31 PROCEDURE — 86923 COMPATIBILITY TEST ELECTRIC: CPT

## 2025-03-31 PROCEDURE — 82310 ASSAY OF CALCIUM: CPT

## 2025-03-31 PROCEDURE — 99285 EMERGENCY DEPT VISIT HI MDM: CPT | Mod: 25

## 2025-03-31 PROCEDURE — 86901 BLOOD TYPING SEROLOGIC RH(D): CPT

## 2025-03-31 PROCEDURE — 99239 HOSP IP/OBS DSCHRG MGMT >30: CPT

## 2025-03-31 PROCEDURE — 84484 ASSAY OF TROPONIN QUANT: CPT

## 2025-03-31 PROCEDURE — 93005 ELECTROCARDIOGRAM TRACING: CPT

## 2025-03-31 PROCEDURE — T1013: CPT

## 2025-03-31 PROCEDURE — 85014 HEMATOCRIT: CPT

## 2025-03-31 PROCEDURE — 85027 COMPLETE CBC AUTOMATED: CPT

## 2025-03-31 PROCEDURE — 82947 ASSAY GLUCOSE BLOOD QUANT: CPT

## 2025-03-31 PROCEDURE — 92978 ENDOLUMINL IVUS OCT C 1ST: CPT | Mod: RC

## 2025-03-31 PROCEDURE — C1753: CPT

## 2025-03-31 PROCEDURE — 83970 ASSAY OF PARATHORMONE: CPT

## 2025-03-31 PROCEDURE — 85018 HEMOGLOBIN: CPT

## 2025-03-31 PROCEDURE — 81001 URINALYSIS AUTO W/SCOPE: CPT

## 2025-03-31 PROCEDURE — 84443 ASSAY THYROID STIM HORMONE: CPT

## 2025-03-31 PROCEDURE — 96375 TX/PRO/DX INJ NEW DRUG ADDON: CPT

## 2025-03-31 PROCEDURE — 82330 ASSAY OF CALCIUM: CPT

## 2025-03-31 PROCEDURE — 71045 X-RAY EXAM CHEST 1 VIEW: CPT

## 2025-03-31 PROCEDURE — 82803 BLOOD GASES ANY COMBINATION: CPT

## 2025-03-31 PROCEDURE — 83540 ASSAY OF IRON: CPT

## 2025-03-31 PROCEDURE — 83605 ASSAY OF LACTIC ACID: CPT

## 2025-03-31 PROCEDURE — 0225U NFCT DS DNA&RNA 21 SARSCOV2: CPT

## 2025-03-31 PROCEDURE — C1769: CPT

## 2025-03-31 PROCEDURE — 85610 PROTHROMBIN TIME: CPT

## 2025-03-31 PROCEDURE — 76937 US GUIDE VASCULAR ACCESS: CPT

## 2025-03-31 PROCEDURE — 82272 OCCULT BLD FECES 1-3 TESTS: CPT

## 2025-03-31 PROCEDURE — 83036 HEMOGLOBIN GLYCOSYLATED A1C: CPT

## 2025-03-31 PROCEDURE — C1874: CPT

## 2025-03-31 PROCEDURE — 87641 MR-STAPH DNA AMP PROBE: CPT

## 2025-03-31 PROCEDURE — 82962 GLUCOSE BLOOD TEST: CPT

## 2025-03-31 PROCEDURE — 82435 ASSAY OF BLOOD CHLORIDE: CPT

## 2025-03-31 PROCEDURE — 94760 N-INVAS EAR/PLS OXIMETRY 1: CPT

## 2025-03-31 PROCEDURE — 87640 STAPH A DNA AMP PROBE: CPT

## 2025-03-31 PROCEDURE — C1894: CPT

## 2025-03-31 PROCEDURE — P9016: CPT

## 2025-03-31 PROCEDURE — 85025 COMPLETE CBC W/AUTO DIFF WBC: CPT

## 2025-03-31 PROCEDURE — 80061 LIPID PANEL: CPT

## 2025-03-31 RX ORDER — METOPROLOL SUCCINATE 50 MG/1
1 TABLET, EXTENDED RELEASE ORAL
Qty: 30 | Refills: 0
Start: 2025-03-31 | End: 2025-04-29

## 2025-03-31 RX ORDER — TICAGRELOR 90 MG/1
1 TABLET ORAL
Qty: 60 | Refills: 2
Start: 2025-03-31 | End: 2025-06-28

## 2025-03-31 RX ORDER — TICAGRELOR 90 MG/1
1 TABLET ORAL
Qty: 60 | Refills: 0
Start: 2025-03-31 | End: 2025-04-29

## 2025-03-31 RX ORDER — METOPROLOL SUCCINATE 50 MG/1
1 TABLET, EXTENDED RELEASE ORAL
Qty: 30 | Refills: 2
Start: 2025-03-31 | End: 2025-06-28

## 2025-03-31 RX ORDER — TORSEMIDE 10 MG
1 TABLET ORAL
Qty: 30 | Refills: 0
Start: 2025-03-31 | End: 2025-04-29

## 2025-03-31 RX ORDER — UREA 40 G
1 VIAL (EA) INTRAVENOUS
Qty: 3 | Refills: 0
Start: 2025-03-31 | End: 2025-04-20

## 2025-03-31 RX ORDER — TORSEMIDE 10 MG
1 TABLET ORAL
Qty: 0 | Refills: 0 | DISCHARGE
Start: 2025-03-31

## 2025-03-31 RX ORDER — TORSEMIDE 10 MG
1 TABLET ORAL
Qty: 30 | Refills: 2
Start: 2025-03-31 | End: 2025-06-28

## 2025-03-31 RX ORDER — UREA 40 G
1 VIAL (EA) INTRAVENOUS
Qty: 21 | Refills: 0
Start: 2025-03-31 | End: 2025-04-20

## 2025-03-31 RX ORDER — IPRATROPIUM BROMIDE AND ALBUTEROL SULFATE .5; 2.5 MG/3ML; MG/3ML
3 SOLUTION RESPIRATORY (INHALATION) EVERY 6 HOURS
Refills: 0 | Status: DISCONTINUED | OUTPATIENT
Start: 2025-03-31 | End: 2025-03-31

## 2025-03-31 RX ORDER — UREA 40 G
1 VIAL (EA) INTRAVENOUS
Qty: 0 | Refills: 0 | DISCHARGE
Start: 2025-03-31

## 2025-03-31 RX ADMIN — GABAPENTIN 100 MILLIGRAM(S): 400 CAPSULE ORAL at 05:56

## 2025-03-31 RX ADMIN — IPRATROPIUM BROMIDE AND ALBUTEROL SULFATE 3 MILLILITER(S): .5; 2.5 SOLUTION RESPIRATORY (INHALATION) at 08:44

## 2025-03-31 RX ADMIN — ERTAPENEM SODIUM 100 MILLIGRAM(S): 1 INJECTION, POWDER, LYOPHILIZED, FOR SOLUTION INTRAMUSCULAR; INTRAVENOUS at 13:33

## 2025-03-31 RX ADMIN — Medication 40 MILLIGRAM(S): at 05:56

## 2025-03-31 RX ADMIN — CALCIUM CARBONATE 1 TABLET(S): 750 TABLET ORAL at 05:57

## 2025-03-31 RX ADMIN — IPRATROPIUM BROMIDE AND ALBUTEROL SULFATE 3 MILLILITER(S): .5; 2.5 SOLUTION RESPIRATORY (INHALATION) at 02:53

## 2025-03-31 RX ADMIN — Medication 81 MILLIGRAM(S): at 12:02

## 2025-03-31 RX ADMIN — Medication 1 APPLICATION(S): at 12:09

## 2025-03-31 RX ADMIN — Medication 650 MILLIGRAM(S): at 12:03

## 2025-03-31 RX ADMIN — CALCITRIOL 0.25 MICROGRAM(S): 0.5 CAPSULE, GELATIN COATED ORAL at 12:04

## 2025-03-31 RX ADMIN — TICAGRELOR 90 MILLIGRAM(S): 90 TABLET ORAL at 05:56

## 2025-03-31 RX ADMIN — Medication 1300 MILLIGRAM(S): at 13:32

## 2025-03-31 RX ADMIN — TAMSULOSIN HYDROCHLORIDE 0.4 MILLIGRAM(S): 0.4 CAPSULE ORAL at 12:03

## 2025-03-31 RX ADMIN — Medication 15 GRAM(S): at 12:04

## 2025-03-31 RX ADMIN — CALCIUM CARBONATE 1 TABLET(S): 750 TABLET ORAL at 13:34

## 2025-03-31 RX ADMIN — Medication 10 MILLIGRAM(S): at 05:56

## 2025-03-31 RX ADMIN — Medication 1300 MILLIGRAM(S): at 05:56

## 2025-03-31 RX ADMIN — RANOLAZINE 500 MILLIGRAM(S): 1000 TABLET, FILM COATED, EXTENDED RELEASE ORAL at 05:56

## 2025-03-31 RX ADMIN — Medication 325 MILLIGRAM(S): at 12:02

## 2025-03-31 RX ADMIN — Medication 10 MILLIGRAM(S): at 13:33

## 2025-03-31 NOTE — DISCHARGE NOTE PROVIDER - INSTRUCTIONS
Diet, Consistent Carbohydrate w/Evening Snack:   DASH/TLC {Sodium & Cholesterol Restricted} (DASH)  1000mL Fluid Restriction ([id number])  No Concentrated Potassium (03-28-25 @ 09:43) [Active]

## 2025-03-31 NOTE — DISCHARGE NOTE PROVIDER - NSDCFUADDAPPT_GEN_ALL_CORE_FT
APPTS ARE READY TO BE MADE: [X] YES    Best Family or Patient Contact (if needed):    Additional Information about above appointments (if needed):    1: Cardiology (2 weeks)  2: Nephrology (2 weeks)  3:     Other comments or requests:    APPTS ARE READY TO BE MADE: [X] YES    Best Family or Patient Contact (if needed):    Additional Information about above appointments (if needed):    1: Cardiology (2 weeks)  2: Nephrology (2 weeks)  3:     Other comments or requests:     Patient was outreached but did not answer. A voicemail was left for the patient to return our call.

## 2025-03-31 NOTE — DISCHARGE NOTE PROVIDER - CARE PROVIDER_API CALL
Chip Ramachandran  Nephrology  [address] [place] [address]  Phone: [phone]  Fax: [phone]  Follow Up Time: 2 weeks    Raeann Gordon  Adv Heart Fail Trns[facility] Cardio  [address] [place] [address]  Phone: [phone]  Fax: [phone]  Follow Up Time: 2 weeks

## 2025-03-31 NOTE — CHART NOTE - NSCHARTNOTESELECT_GEN_ALL_CORE
Event Note
Nutrition Services
post LHC/Event Note
Event Note
Heart Failure/Event Note
post sheath removal/Event Note

## 2025-03-31 NOTE — DISCHARGE NOTE PROVIDER - NSDCMRMEDTOKEN_GEN_ALL_CORE_FT
aspirin 81 mg oral delayed release tablet: 1 tab(s) orally once a day  atorvastatin 80 mg oral tablet: 1 tab(s) orally once a day (at bedtime)  ferrous sulfate 325 mg (65 mg elemental iron) oral tablet: 1 tab(s) orally once a day  gabapentin 800 mg oral tablet: 0.5 tab(s) orally once a day  hydrALAZINE 10 mg oral tablet: 1 tab(s) orally 3 times a day  insulin glargine 100 units/mL subcutaneous solution: 26 unit(s) subcutaneous once a day (at bedtime)  Metoprolol Succinate ER 50 mg oral tablet, extended release: 1 tab(s) orally once a day (at bedtime)  ranolazine 500 mg oral tablet, extended release: 1 tab(s) orally 2 times a day  sodium bicarbonate 650 mg oral tablet: 2 tab(s) orally 2 times a day  ticagrelor 90 mg oral tablet: 1 tab(s) orally 2 times a day  torsemide 40 mg oral tablet: 1 tab(s) orally once a day  urea 15 g oral powder for reconstitution: 1 packet(s) orally once a day   aspirin 81 mg oral delayed release tablet: 1 tab(s) orally once a day  atorvastatin 80 mg oral tablet: 1 tab(s) orally once a day (at bedtime)  ferrous sulfate 325 mg (65 mg elemental iron) oral tablet: 1 tab(s) orally once a day  gabapentin 800 mg oral tablet: 0.5 tab(s) orally once a day  hydrALAZINE 10 mg oral tablet: 1 tab(s) orally 3 times a day  insulin glargine 100 units/mL subcutaneous solution: 26 unit(s) subcutaneous once a day (at bedtime)  Metoprolol Succinate ER 50 mg oral tablet, extended release: 1 tab(s) orally once a day (at bedtime)  ranolazine 500 mg oral tablet, extended release: 1 tab(s) orally 2 times a day  sodium bicarbonate 650 mg oral tablet: 2 tab(s) orally 2 times a day  ticagrelor 90 mg oral tablet: 1 tab(s) orally 2 times a day  torsemide 40 mg oral tablet: 1 tab(s) orally once a day

## 2025-03-31 NOTE — DISCHARGE NOTE NURSING/CASE MANAGEMENT/SOCIAL WORK - NSDCFUADDAPPT_GEN_ALL_CORE_FT
Detail Level: Detailed APPTS ARE READY TO BE MADE: [X] YES    Best Family or Patient Contact (if needed):    Additional Information about above appointments (if needed):    1: Cardiology (2 weeks)  2: Nephrology (2 weeks)  3:     Other comments or requests:

## 2025-03-31 NOTE — DISCHARGE NOTE PROVIDER - HOSPITAL COURSE
74 year old female with Hypertension, Diabetes, Coronary artery disease s/p PCI, Heart failure with reduced EF, Mobitz Type II s/p Micra PPM, CKD3, Anemia, Stroke and PAD presented with dyspnea and admitted for NSTEMI. She was heparinized and subsequently underwent PCI to RCA ISR on DAPT. Hospital stay significant for  ESBL E. coli Pyelonephritis and bacteremia s/p Ertapenam. Also noted with BRADY on CKD, Scr stabilized on discharge. Noted with acute HFrEF down to 25%. s/p IV lasix on PO Torsemide. Unable to add additional GDMT due to BRADY, will followup wiht cards and renal outpatient about timing of resumption of GDMT.

## 2025-03-31 NOTE — DISCHARGE NOTE NURSING/CASE MANAGEMENT/SOCIAL WORK - FINANCIAL ASSISTANCE
[facility] provides services at a reduced cost to those who are determined to be eligible through [facility]’s financial assistance program. Information regarding [facility]’s financial assistance program can be found by going to [email or web address] or by calling [phone].

## 2025-03-31 NOTE — DISCHARGE NOTE NURSING/CASE MANAGEMENT/SOCIAL WORK - NSDCPEPT PROEDHF_GEN_ALL_CORE
Call primary care provider for follow up after discharge/Activities as tolerated/Low salt diet/Monitor weight daily/Report signs and symptoms to primary care provider
Statement Selected

## 2025-03-31 NOTE — DISCHARGE NOTE PROVIDER - NSDCFUSCHEDAPPT_GEN_ALL_CORE_FT
Travis Allen  [facility]  CARDIOLOGY [address]  Scheduled Appointment: 04/21/2025    Dylan Canchola  [facility]  NEPHRO [address]  Scheduled Appointment: 06/10/2025    [facility]  CARDIOLOGY [address]  Scheduled Appointment: 06/24/2025

## 2025-03-31 NOTE — DISCHARGE NOTE NURSING/CASE MANAGEMENT/SOCIAL WORK - PATIENT PORTAL LINK FT
You can access the FollowMyHealth Patient Portal offered by [facility] by registering at the following website: [email or web address]. By joining [facility]’s FollowMyHealth portal, you will also be able to view your health information using other applications (apps) compatible with our system.

## 2025-03-31 NOTE — PHYSICAL THERAPY INITIAL EVALUATION ADULT - PERTINENT HX OF CURRENT PROBLEM, REHAB EVAL
Patient is a 74y old  Female who presents with a chief complaint of Non-ST elevation myocardial infarction (NSTEMI)

## 2025-03-31 NOTE — DISCHARGE NOTE PROVIDER - NSDCCPCAREPLAN_GEN_ALL_CORE_FT
PRINCIPAL DISCHARGE DIAGNOSIS  Diagnosis: NSTEMI (non-ST elevation myocardial infarction)  Assessment and Plan of Treatment:       SECONDARY DISCHARGE DIAGNOSES  Diagnosis: Acute on chronic HFrEF (heart failure with reduced ejection fraction)  Assessment and Plan of Treatment: Followup with cardiology outpatient about timing fo resume additional cardiac medications    Diagnosis: ESBL (extended spectrum beta-lactamase) producing bacteria infection  Assessment and Plan of Treatment:     Diagnosis: Bacteremia  Assessment and Plan of Treatment:     Diagnosis: Acute on chronic renal failure  Assessment and Plan of Treatment:

## 2025-03-31 NOTE — CHART NOTE - NSCHARTNOTEFT_GEN_A_CORE
Source: Patient, family, chart     Current Diet: Diet, Consistent Carbohydrate w/Evening Snack:   DASH/TLC {Sodium & Cholesterol Restricted} (DASH)  1000mL Fluid Restriction ([id number])  No Concentrated Potassium (03-28-25 @ 09:43)    PO intake:  Variable     Source for PO intake: Patient, family, chart     Current Weight:   3/20 151 lbs  3/25 145 lbs  3/27 159.1 lbs  3/29 143.7 lbs  3/30 162 lbs    Pertinent Medications: MEDICATIONS  (STANDING):  calcitriol   Capsule 0.25 MICROGram(s) Oral daily  calcium carbonate   1250 mG (OsCal) 1 Tablet(s) Oral three times a day  dextrose 5%. 1000 milliLiter(s) (100 mL/Hr) IV Continuous <Continuous>  dextrose 50% Injectable 12.5 Gram(s) IV Push once  epoetin kristina-epbx (RETACRIT) Injectable 55639 Unit(s) SubCutaneous <User Schedule>  ertapenem  IVPB 500 milliGRAM(s) IV Intermittent every 24 hours  ferrous    sulfate 325 milliGRAM(s) Oral daily  glucagon  Injectable 1 milliGRAM(s) IntraMuscular once  hydrALAZINE 10 milliGRAM(s) Oral three times a day  HYDROmorphone  Injectable 0.5 milliGRAM(s) IV Push once  insulin glargine Injectable (LANTUS) 26 Unit(s) SubCutaneous at bedtime  insulin lispro (ADMELOG) corrective regimen sliding scale   SubCutaneous three times a day before meals  metoprolol succinate ER 50 milliGRAM(s) Oral at bedtime  pantoprazole    Tablet 40 milliGRAM(s) Oral before breakfast  ranolazine 500 milliGRAM(s) Oral two times a day  sodium bicarbonate 1300 milliGRAM(s) Oral three times a day  ticagrelor 90 milliGRAM(s) Oral two times a day  torsemide 40 milliGRAM(s) Oral daily  urea Oral Powder 15 Gram(s) Oral daily    MEDICATIONS  (PRN):  dextrose Oral Gel 15 Gram(s) Oral once PRN Blood Glucose LESS THAN 70 milliGRAM(s)/deciliter  ondansetron Injectable 4 milliGRAM(s) IV Push every 8 hours PRN Nausea and/or Vomiting    Pertinent Labs: 03-31 Na133 mmol/L[L] Glu 134 mg/dL[H] K+ 4.1 mmol/L Cr  2.94 mg/dL[H] BUN 93.2 mg/dL[H]    Estimated Needs:   3355-3364 kcal (20-25 kcal/kg 72.1kg)  72-87 g protein (1-1.2g/kg 72.1kg)    Hospital Course: Per chart "74 year old female with Hypertension, Diabetes, Coronary artery disease s/p PCI, Heart failure with reduced EF, Mobitz Type II s/p Micra PPM, CKD3, Anemia, Stroke and PAD presented with dyspnea and admitted for NSTEMI. She was heparinized and subsequently underwent PCI to RCA ISR. Hospital stay significant for  ESBL E. coli Pyelonephritis and bacteremia, BRADY and multiple electrolyte abnormalities, Encephalopathy. Temporary requirement for midodrine. Bacteremia cleared (3/25/25). BRADY improving."    Current Nutrition Diagnosis: Increased Nutrient Needs (protein) related to increased physiological demand for healing as evidenced by CHF.    Patient Armenian speaking -  services used (ID# [id number]). Patient very lethargic upon interview this AM -  at bedside to assist in nutrition interview. Pt ate ~50% of eggs this AM and some rice from home. PO intake appears to be variable 0-100% documented in nursing flowsheets. Pt with no c/o N/V/C/D at this time. Attempted to give pt diet education, but continuously falling asleep. Printed education provided to pt at initial assessment. Will continue to monitor and follow up as needed. RD remains available.     Recommendations:   1) Continue diet as tolerated.   2) Encourage po intake, monitor diet tolerance, and provide assistance at meals as needed.   3) Monitor BG levels, correct prn   4) Obtain daily weights to monitor trends.     Monitoring and Evaluation:   [x] PO intake [x] Tolerance to diet prescription [X] Weights  [X] Follow up per protocol [X] Labs: chem 8, POCT glucose

## 2025-03-31 NOTE — DISCHARGE NOTE NURSING/CASE MANAGEMENT/SOCIAL WORK - NSDCPEFALRISK_GEN_ALL_CORE
For information on Fall & Injury Prevention, visit: [email or web address] OR  [email or web address] OR  https://www.cdc.gov/steadi/patient.html

## 2025-03-31 NOTE — DISCHARGE NOTE PROVIDER - POSTFACE STATEMENT FOR MINUTES SPENT
minutes on the discharge service. PAST SURGICAL HISTORY:  Bilateral cataracts s/p removal    H/O cardiac radiofrequency ablation PVC-Dr Barrientos 2/2016    H/O tracheostomy successful removal    History of loop recorder june 2018  [facility]  dr Ivy    PEG (percutaneous endoscopic gastrostomy) status     Presence of Watchman left atrial appendage closure device feb  15  2019    S/P tonsillectomy

## 2025-03-31 NOTE — DISCHARGE NOTE PROVIDER - PROVIDER TOKENS
PROVIDER:[TOKEN:[[id number]],FOLLOWUP:[2 weeks]],PROVIDER:[TOKEN:[[id number]],FOLLOWUP:[2 weeks]]

## 2025-04-01 LAB
CULTURE RESULTS: SIGNIFICANT CHANGE UP
SPECIMEN SOURCE: SIGNIFICANT CHANGE UP

## 2025-04-01 RX ORDER — HYDRALAZINE HYDROCHLORIDE 10 MG/1
10 TABLET ORAL
Refills: 0 | Status: ACTIVE | COMMUNITY
Start: 2025-04-01

## 2025-04-01 RX ORDER — TICAGRELOR 90 MG/1
90 TABLET ORAL
Refills: 0 | Status: ACTIVE | COMMUNITY
Start: 2025-04-01

## 2025-04-01 RX ORDER — TORSEMIDE 20 MG/1
20 TABLET ORAL
Refills: 0 | Status: ACTIVE | COMMUNITY
Start: 2025-04-01

## 2025-04-02 ENCOUNTER — NON-APPOINTMENT (OUTPATIENT)
Age: 75
End: 2025-04-02

## 2025-04-08 ENCOUNTER — NON-APPOINTMENT (OUTPATIENT)
Age: 75
End: 2025-04-08

## 2025-04-08 ENCOUNTER — LABORATORY RESULT (OUTPATIENT)
Age: 75
End: 2025-04-08

## 2025-04-09 ENCOUNTER — APPOINTMENT (OUTPATIENT)
Dept: HEART FAILURE | Facility: CLINIC | Age: 75
End: 2025-04-09
Payer: MEDICARE

## 2025-04-09 ENCOUNTER — INPATIENT (INPATIENT)
Facility: HOSPITAL | Age: 75
LOS: 29 days | Discharge: HOME | DRG: 293 | End: 2025-05-09
Attending: HOSPITALIST | Admitting: HOSPITALIST
Payer: MEDICARE

## 2025-04-09 VITALS
DIASTOLIC BLOOD PRESSURE: 76 MMHG | HEIGHT: 59 IN | WEIGHT: 168 LBS | HEART RATE: 67 BPM | SYSTOLIC BLOOD PRESSURE: 128 MMHG | OXYGEN SATURATION: 98 % | BODY MASS INDEX: 33.87 KG/M2

## 2025-04-09 VITALS
HEIGHT: 59 IN | OXYGEN SATURATION: 96 % | HEART RATE: 68 BPM | TEMPERATURE: 98 F | RESPIRATION RATE: 22 BRPM | DIASTOLIC BLOOD PRESSURE: 105 MMHG | SYSTOLIC BLOOD PRESSURE: 180 MMHG | WEIGHT: 167.99 LBS

## 2025-04-09 DIAGNOSIS — I50.23 ACUTE ON CHRONIC SYSTOLIC (CONGESTIVE) HEART FAILURE: ICD-10-CM

## 2025-04-09 DIAGNOSIS — N18.9 ACUTE KIDNEY FAILURE, UNSPECIFIED: ICD-10-CM

## 2025-04-09 DIAGNOSIS — N17.9 ACUTE KIDNEY FAILURE, UNSPECIFIED: ICD-10-CM

## 2025-04-09 DIAGNOSIS — Z98.890 OTHER SPECIFIED POSTPROCEDURAL STATES: Chronic | ICD-10-CM

## 2025-04-09 DIAGNOSIS — I25.10 ATHEROSCLEROTIC HEART DISEASE OF NATIVE CORONARY ARTERY W/OUT ANGINA PECTORIS: ICD-10-CM

## 2025-04-09 DIAGNOSIS — I25.10 ATHEROSCLEROTIC HEART DISEASE OF NATIVE CORONARY ARTERY WITHOUT ANGINA PECTORIS: ICD-10-CM

## 2025-04-09 DIAGNOSIS — Z95.0 PRESENCE OF CARDIAC PACEMAKER: Chronic | ICD-10-CM

## 2025-04-09 DIAGNOSIS — Z98.61 ATHEROSCLEROTIC HEART DISEASE OF NATIVE CORONARY ARTERY W/OUT ANGINA PECTORIS: ICD-10-CM

## 2025-04-09 DIAGNOSIS — Z86.011 PERSONAL HISTORY OF BENIGN NEOPLASM OF THE BRAIN: Chronic | ICD-10-CM

## 2025-04-09 DIAGNOSIS — H26.9 UNSPECIFIED CATARACT: Chronic | ICD-10-CM

## 2025-04-09 LAB
ALBUMIN SERPL ELPH-MCNC: 3.3 G/DL — SIGNIFICANT CHANGE UP (ref 3.3–5.2)
ALP SERPL-CCNC: 148 U/L — HIGH (ref 40–120)
ALT FLD-CCNC: 34 U/L — HIGH
ANION GAP SERPL CALC-SCNC: 13 MMOL/L — SIGNIFICANT CHANGE UP (ref 5–17)
AST SERPL-CCNC: 34 U/L — HIGH
BASOPHILS # BLD AUTO: 0.02 K/UL — SIGNIFICANT CHANGE UP (ref 0–0.2)
BASOPHILS NFR BLD AUTO: 0.2 % — SIGNIFICANT CHANGE UP (ref 0–2)
BILIRUB SERPL-MCNC: 0.7 MG/DL — SIGNIFICANT CHANGE UP (ref 0.4–2)
BUN SERPL-MCNC: 37.7 MG/DL — HIGH (ref 8–20)
CALCIUM SERPL-MCNC: 7.4 MG/DL — LOW (ref 8.4–10.5)
CHLORIDE SERPL-SCNC: 88 MMOL/L — LOW (ref 96–108)
CO2 SERPL-SCNC: 24 MMOL/L — SIGNIFICANT CHANGE UP (ref 22–29)
CREAT SERPL-MCNC: 1.94 MG/DL — HIGH (ref 0.5–1.3)
EGFR: 27 ML/MIN/1.73M2 — LOW
EGFR: 27 ML/MIN/1.73M2 — LOW
EOSINOPHIL # BLD AUTO: 0.15 K/UL — SIGNIFICANT CHANGE UP (ref 0–0.5)
EOSINOPHIL NFR BLD AUTO: 1.7 % — SIGNIFICANT CHANGE UP (ref 0–6)
GLUCOSE BLDC GLUCOMTR-MCNC: 103 MG/DL — HIGH (ref 70–99)
GLUCOSE BLDC GLUCOMTR-MCNC: 189 MG/DL — HIGH (ref 70–99)
GLUCOSE SERPL-MCNC: 126 MG/DL — HIGH (ref 70–99)
HCT VFR BLD CALC: 31 % — LOW (ref 34.5–45)
HGB BLD-MCNC: 10.1 G/DL — LOW (ref 11.5–15.5)
IMM GRANULOCYTES # BLD AUTO: 0.09 K/UL — HIGH (ref 0–0.07)
IMM GRANULOCYTES NFR BLD AUTO: 1 % — HIGH (ref 0–0.9)
LYMPHOCYTES # BLD AUTO: 0.64 K/UL — LOW (ref 1–3.3)
LYMPHOCYTES NFR BLD AUTO: 7.3 % — LOW (ref 13–44)
MAGNESIUM SERPL-MCNC: 2.3 MG/DL — SIGNIFICANT CHANGE UP (ref 1.6–2.6)
MCHC RBC-ENTMCNC: 28.9 PG — SIGNIFICANT CHANGE UP (ref 27–34)
MCHC RBC-ENTMCNC: 32.6 G/DL — SIGNIFICANT CHANGE UP (ref 32–36)
MCV RBC AUTO: 88.8 FL — SIGNIFICANT CHANGE UP (ref 80–100)
MONOCYTES # BLD AUTO: 0.8 K/UL — SIGNIFICANT CHANGE UP (ref 0–0.9)
MONOCYTES NFR BLD AUTO: 9.1 % — SIGNIFICANT CHANGE UP (ref 2–14)
NEUTROPHILS # BLD AUTO: 7.09 K/UL — SIGNIFICANT CHANGE UP (ref 1.8–7.4)
NEUTROPHILS NFR BLD AUTO: 80.7 % — HIGH (ref 43–77)
NRBC # BLD AUTO: 0 K/UL — SIGNIFICANT CHANGE UP (ref 0–0)
NRBC # FLD: 0 K/UL — SIGNIFICANT CHANGE UP (ref 0–0)
NRBC BLD AUTO-RTO: 0 /100 WBCS — SIGNIFICANT CHANGE UP (ref 0–0)
NT-PROBNP SERPL-SCNC: HIGH PG/ML (ref 0–300)
PLATELET # BLD AUTO: 572 K/UL — HIGH (ref 150–400)
PMV BLD: 9.8 FL — SIGNIFICANT CHANGE UP (ref 7–13)
POTASSIUM SERPL-MCNC: 4.6 MMOL/L — SIGNIFICANT CHANGE UP (ref 3.5–5.3)
POTASSIUM SERPL-SCNC: 4.6 MMOL/L — SIGNIFICANT CHANGE UP (ref 3.5–5.3)
PROT SERPL-MCNC: 7.1 G/DL — SIGNIFICANT CHANGE UP (ref 6.6–8.7)
RBC # BLD: 3.49 M/UL — LOW (ref 3.8–5.2)
RBC # FLD: 17.7 % — HIGH (ref 10.3–14.5)
SODIUM SERPL-SCNC: 125 MMOL/L — LOW (ref 135–145)
TROPONIN T, HIGH SENSITIVITY RESULT: 140 NG/L — HIGH (ref 0–51)
WBC # BLD: 8.79 K/UL — SIGNIFICANT CHANGE UP (ref 3.8–10.5)
WBC # FLD AUTO: 8.79 K/UL — SIGNIFICANT CHANGE UP (ref 3.8–10.5)

## 2025-04-09 PROCEDURE — 99223 1ST HOSP IP/OBS HIGH 75: CPT

## 2025-04-09 PROCEDURE — 71045 X-RAY EXAM CHEST 1 VIEW: CPT | Mod: 26

## 2025-04-09 PROCEDURE — 93010 ELECTROCARDIOGRAM REPORT: CPT

## 2025-04-09 PROCEDURE — 99285 EMERGENCY DEPT VISIT HI MDM: CPT

## 2025-04-09 PROCEDURE — 99215 OFFICE O/P EST HI 40 MIN: CPT

## 2025-04-09 RX ORDER — ACETAMINOPHEN 500 MG/5ML
325 LIQUID (ML) ORAL EVERY 4 HOURS
Refills: 0 | Status: DISCONTINUED | OUTPATIENT
Start: 2025-04-09 | End: 2025-04-12

## 2025-04-09 RX ORDER — TICAGRELOR 90 MG/1
60 TABLET ORAL EVERY 12 HOURS
Refills: 0 | Status: DISCONTINUED | OUTPATIENT
Start: 2025-04-09 | End: 2025-04-21

## 2025-04-09 RX ORDER — FUROSEMIDE 10 MG/ML
80 INJECTION INTRAMUSCULAR; INTRAVENOUS ONCE
Refills: 0 | Status: DISCONTINUED | OUTPATIENT
Start: 2025-04-09 | End: 2025-04-09

## 2025-04-09 RX ORDER — DEXTROSE 50 % IN WATER 50 %
25 SYRINGE (ML) INTRAVENOUS ONCE
Refills: 0 | Status: DISCONTINUED | OUTPATIENT
Start: 2025-04-09 | End: 2025-05-09

## 2025-04-09 RX ORDER — GABAPENTIN 400 MG/1
400 CAPSULE ORAL DAILY
Refills: 0 | Status: DISCONTINUED | OUTPATIENT
Start: 2025-04-09 | End: 2025-05-09

## 2025-04-09 RX ORDER — BUMETANIDE 1 MG/1
2 TABLET ORAL
Refills: 0 | Status: DISCONTINUED | OUTPATIENT
Start: 2025-04-09 | End: 2025-04-10

## 2025-04-09 RX ORDER — GLUCAGON 3 MG/1
1 POWDER NASAL ONCE
Refills: 0 | Status: DISCONTINUED | OUTPATIENT
Start: 2025-04-09 | End: 2025-05-09

## 2025-04-09 RX ORDER — SODIUM CHLORIDE 9 G/1000ML
1000 INJECTION, SOLUTION INTRAVENOUS
Refills: 0 | Status: DISCONTINUED | OUTPATIENT
Start: 2025-04-09 | End: 2025-05-09

## 2025-04-09 RX ORDER — INSULIN LISPRO 100 U/ML
INJECTION, SOLUTION INTRAVENOUS; SUBCUTANEOUS
Refills: 0 | Status: DISCONTINUED | OUTPATIENT
Start: 2025-04-09 | End: 2025-05-09

## 2025-04-09 RX ORDER — INSULIN GLARGINE-YFGN 100 [IU]/ML
25 INJECTION, SOLUTION SUBCUTANEOUS AT BEDTIME
Refills: 0 | Status: DISCONTINUED | OUTPATIENT
Start: 2025-04-09 | End: 2025-04-16

## 2025-04-09 RX ORDER — ATORVASTATIN CALCIUM 80 MG/1
80 TABLET, FILM COATED ORAL AT BEDTIME
Refills: 0 | Status: DISCONTINUED | OUTPATIENT
Start: 2025-04-09 | End: 2025-05-09

## 2025-04-09 RX ORDER — DEXTROSE 50 % IN WATER 50 %
15 SYRINGE (ML) INTRAVENOUS ONCE
Refills: 0 | Status: DISCONTINUED | OUTPATIENT
Start: 2025-04-09 | End: 2025-05-09

## 2025-04-09 RX ORDER — METOPROLOL SUCCINATE 50 MG/1
25 TABLET, EXTENDED RELEASE ORAL
Refills: 0 | Status: DISCONTINUED | OUTPATIENT
Start: 2025-04-09 | End: 2025-04-11

## 2025-04-09 RX ORDER — DEXTROSE 50 % IN WATER 50 %
12.5 SYRINGE (ML) INTRAVENOUS ONCE
Refills: 0 | Status: DISCONTINUED | OUTPATIENT
Start: 2025-04-09 | End: 2025-05-09

## 2025-04-09 RX ORDER — ASPIRIN 325 MG
81 TABLET ORAL DAILY
Refills: 0 | Status: DISCONTINUED | OUTPATIENT
Start: 2025-04-09 | End: 2025-05-09

## 2025-04-09 RX ADMIN — Medication 81 MILLIGRAM(S): at 17:31

## 2025-04-09 RX ADMIN — INSULIN GLARGINE-YFGN 25 UNIT(S): 100 INJECTION, SOLUTION SUBCUTANEOUS at 22:00

## 2025-04-09 RX ADMIN — Medication 50 MILLIGRAM(S): at 11:47

## 2025-04-09 RX ADMIN — ATORVASTATIN CALCIUM 80 MILLIGRAM(S): 80 TABLET, FILM COATED ORAL at 21:58

## 2025-04-09 RX ADMIN — BUMETANIDE 2 MILLIGRAM(S): 1 TABLET ORAL at 11:47

## 2025-04-09 RX ADMIN — GABAPENTIN 400 MILLIGRAM(S): 400 CAPSULE ORAL at 17:31

## 2025-04-09 RX ADMIN — TICAGRELOR 60 MILLIGRAM(S): 90 TABLET ORAL at 17:31

## 2025-04-09 RX ADMIN — Medication 50 MILLIGRAM(S): at 21:58

## 2025-04-09 RX ADMIN — METOPROLOL SUCCINATE 25 MILLIGRAM(S): 50 TABLET, EXTENDED RELEASE ORAL at 17:31

## 2025-04-09 RX ADMIN — INSULIN LISPRO 2: 100 INJECTION, SOLUTION INTRAVENOUS; SUBCUTANEOUS at 17:31

## 2025-04-09 NOTE — CONSULT NOTE ADULT - SUBJECTIVE AND OBJECTIVE BOX
[facility] Advanced Heart Failure - Initial Consult Note  [address] [place] [address]  Office Phone: [phone]/Fax: [phone]  Service/On Call Phone [phone]    HPI:  74F with a prior hospitalization for NSTEMI and bacteremia who presented with increased dyspnea on exertion. The patient denied any associated chest pain or palpitations but did not lower extremity swelling and increased weight of about 16 pounds since discharge from the hospital. She was also reported to have increased CardioMEMS reading of 31 and 25. The patient reported that she was compliant with her medications at home. There were no recent fevers, chills, or sick contacts.   (09 Apr 2025 12:52)  Above appreciated. Patient seen/examined at bedside.  utilized (ID# [id number]). Patient sent in from heart failure clinic due to weight gain and LE edema despite escalation of PO diuretics. The patient also endorses weight gain, decreased urinary output and "tiredness in her chest". She specifically denies chest pain, palpitations, near syncope/syncope and orthopnea.  She is not very active at baseline due to knee injury.     ROS:  14 point ROS negative in detail apart from as documented in HPI.    Past Medical and Surgical History:  DM (diabetes mellitus)    HTN (hypertension)    H/O gastroesophageal reflux (GERD)    Cardiac pacemaker  MICRA for mobitz type 11    CVA (cerebrovascular accident)  resdiual right sided weakness    CVA (cerebrovascular accident)    PAD (peripheral artery disease)    Wound of right leg    History of benign brain tumor    Cataract    History of biopsy    Cardiac pacemaker    S/P angiogram of extremity    MEDICATIONS  (STANDING):  buMETAnide Injectable 2 milliGRAM(s) IV Push two times a day  hydrALAZINE 50 milliGRAM(s) Oral three times a day    MEDICATIONS  (PRN):  acetaminophen     Tablet .. 325 milliGRAM(s) Oral every 4 hours PRN Temp greater or equal to 38C (100.4F), Mild Pain (1 - 3)    Allergies:  No Known Allergies    Social History:    Family History:  FH: asthma (Son)    Vital Signs:  T(C): 36.3, Max: 36.4 (04-09 @ 09:10)  HR: 66 (65 - 68)  BP: 151/87 (151/87 - 180/105)  BP(mean): 108 (108 - 108)  RR: 18 (18 - 22)  SpO2: 99% (96% - 99%)    Tele:     EKG: , nonspecific ST-T wave abnormalities    Physical Exam:  General: In no distress. Able to lay flat.  Neck: Neck supple. JVP difficult to assess-does not appear significantly elevated.  Chest: RLL crackles.  CV: RRR. Normal S1 and S2. No murmurs, rub, or gallops.   PV: 1+ B/L LE edema.  Abdomen: Soft, non-distended, non-tender.  Skin: dry, cool.  Neurology: Alert and oriented times three. Sensation intact.  Psych: Appropriate affect.    Labs:    ( 04-09-25 @ 10:04 )               10.1   8.79  )--------( 572                  31.0     ( 04-09-25 @ 10:04 )     125  |  88  |  37.7  ---------------------<  126  4.6  |  24.0  |  1.94    Ca 7.4  Phos x   Mg 2.3    ( 04-09-25 @ 10:04 )  TPro  7.1  /  Alb  3.3  /  TBili  0.7  /  DBili  x   /  AST  34  /  ALT  34  /  AlkPhos  148      ( 04-09-25 @ 10:04 )  TropHS 140   / CK x     / CKMB x      ( 03-20-25 @ 00:20 )  TropHS 854   / CK x     / CKMB x          Xray Chest 1 View- PORTABLE-Urgent (04.09.25 @ 10:27)     IMPRESSION: Slight prominence to perihilar interstitial markings   bilaterally minimally more prominent and cardiomegaly. Micropacer as   before. Regional osseous structures unchanged. Follow-up suggested as   indicated clinically.    --- End of Report ---    TTE W or WO Ultrasound Enhancing Agent (03.21.25 @ 17:56)   CONCLUSIONS:      1. Technically difficult image quality.   2. Left ventricular cavity is normal in size. Left ventricular systolic function is moderately decreased with an ejection fraction of 31 % by Taylor's method of disks with an ejection fraction visually estimated at 25 to 30 %. Regional wall motion abnormalities present.   3. Multiple segmental abnormalities exist. See findings.   4. There is moderate (grade 2) left ventricular diastolic dysfunction, with elevated left ventricular filling pressure.   5. There is no evidence of a left ventricular thrombus.   6. Normal right ventricular cavity size and normal right ventricular systolic function.   7. Left atrium is severely dilated.   8. Moderate mitral regurgitation.   9. Mild tricuspid regurgitation.  10. Estimated pulmonary artery systolic pressure is 58 mmHg.  11. The inferior vena cava is normal in size measuring 1.10 cm in diameter, (normal <2.1cm) with abnormal inspiratory collapse (abnormal <50%) consistent with mildly elevated right atrial pressure (~8, range 5-10mmHg).  12. No pericardial effusion seen.  13. Compared to the transthoracic echocardiogram performed on 10/8/2024, further reduction in LVEF seen and PASP has increased.    < end of copied text >

## 2025-04-09 NOTE — ED ADULT NURSE REASSESSMENT NOTE - NS ED NURSE REASSESS COMMENT FT1
Patient resting comfortably in bed. Paced on tele monitor, saturating 99% on room air. No complaints at this time. Daughter at bedside.

## 2025-04-09 NOTE — ED ADULT NURSE NOTE - OBJECTIVE STATEMENT
Patient BIBEMS from cardiology office for BNP to 70,000. +3 ankle swelling noted. Also endorses a fall today landing on right knee, states she is on blood thinners but no head strike. Patient complaining of 8/10 pain throughout entire body. Saturating 100% on room air, clear lung sounds, dyspnic on exertion, afebrile, normal paced rhythm on tele monitor, continuous pulse ox in place. Nontender abdomen. Last bowel movement today. Endorses frequent urination with small amounts each time. +3 edema to ankles. Positive pulses

## 2025-04-09 NOTE — ED ADULT NURSE NOTE - NSICDXPASTSURGICALHX_GEN_ALL_CORE_FT
Subjective   History of Present Illness  Patient is a 40-year-old gentleman presents the emergency room for URI symptoms and chest pain.  He has a history of neuropathy and takes gabapentin.  No other medications.    Says a couple days ago he felt crappy.  Yesterday he had a migraine and took an NSAID which normally takes care of his migraines but it did not.  Today he has subjective fevers, cough and congestion, and lower sternal chest pain that worsens with coughing.  He has not had leg swelling.  His only risk factor for CAD is smoking.      Review of Systems   Constitutional: Negative.  Positive for fatigue and fever (Subjective).   HENT:  Positive for congestion.    Eyes: Negative.    Respiratory:  Positive for cough. Negative for wheezing.    Cardiovascular:  Positive for chest pain. Negative for palpitations and leg swelling.   Gastrointestinal: Negative.    Musculoskeletal: Negative.    Skin: Negative.    Neurological:  Positive for headaches.   Psychiatric/Behavioral: Negative.     All other systems reviewed and are negative.      Past Medical History:   Diagnosis Date    Depression     Migraine     Shoulder pain        Allergies   Allergen Reactions    Cinnamon Cough    Coconut (Cocos Nucifera) Other (See Comments)       Past Surgical History:   Procedure Laterality Date    VASECTOMY         No family history on file.    Social History     Socioeconomic History    Marital status: Single   Tobacco Use    Smoking status: Every Day     Current packs/day: 0.50     Types: Cigarettes    Smokeless tobacco: Never   Vaping Use    Vaping status: Some Days    Substances: Nicotine    Devices: Disposable   Substance and Sexual Activity    Alcohol use: No    Drug use: Yes     Types: Marijuana     Comment: occational     Sexual activity: Defer           Objective   Physical Exam  Vitals reviewed.   Constitutional:       Appearance: He is well-developed and normal weight.   Cardiovascular:      Rate and Rhythm: Normal  rate and regular rhythm.      Heart sounds: Normal heart sounds.   Pulmonary:      Effort: Pulmonary effort is normal.      Breath sounds: Examination of the left-middle field reveals rhonchi. Rhonchi present. No decreased breath sounds, wheezing or rales.   Skin:     General: Skin is warm.   Neurological:      General: No focal deficit present.      Mental Status: He is alert.   Psychiatric:         Mood and Affect: Mood normal.         ECG 12 Lead      Date/Time: 2/17/2025 12:20 PM    Performed by: Tasha Mcclure PA-C  Authorized by: Rod Boykin MD  Interpreted by ED physician  Comparison: compared with previous ECG from 8/16/2019  Similar to previous ECG  Rhythm: sinus rhythm  Rate: normal  BPM: 96  Conduction: conduction normal  ST Segments: ST segments normal  T Waves: T waves normal  Clinical impression: normal ECG               ED Course                                                       Medical Decision Making  Presentation patient looks like he does not feel well but vitals are stable.  Normal oxygen.  Lungs are clear other than an area in the midportion of his back on the right side.  Chest x-ray clear without evidence of pneumonia..-He is positive for COVID.  Troponin is 7.  EKG normal.  Symptoms consistent with costochondritis as he says it hurts when he is coughing.  Very unlikely to be heart disease he is not having dyspnea and very low suspicion for.  Blood clot.  He is medically cleared for home.  He says he is over-the-counter medications and does not need a prescription for anything.  He works Monday through Friday and I gave him to work notes.  1 will have him go back on Monday and the other on this Friday.  I told if he has any trouble breathing he is to return to the emergency room and he voices understanding.  He agrees with plan.    Problems Addressed:  COVID: complicated acute illness or injury    Amount and/or Complexity of Data Reviewed  Labs: ordered.  Radiology:  ordered.  ECG/medicine tests: ordered and independent interpretation performed.    Risk  Prescription drug management.        Final diagnoses:   COVID       ED Disposition  ED Disposition       ED Disposition   Discharge    Condition   Stable    Comment   --               Les Garcia MD  [address] [facility] [address] [phone]    In 1 week           Medication List      No changes were made to your prescriptions during this visit.            Tasha Mcclure PA-C  02/17/25 1338     PAST SURGICAL HISTORY:  Cardiac pacemaker     Cataract     History of benign brain tumor     History of biopsy     S/P angiogram of extremity

## 2025-04-09 NOTE — H&P ADULT - NSHPPHYSICALEXAM_GEN_ALL_CORE
Vital Signs Last 24 Hrs  T(C): 36.3 (09 Apr 2025 11:44), Max: 36.4 (09 Apr 2025 09:10)  T(F): 97.4 (09 Apr 2025 11:44), Max: 97.6 (09 Apr 2025 09:10)  HR: 66 (09 Apr 2025 11:44) (65 - 68)  BP: 151/87 (09 Apr 2025 11:44) (151/87 - 180/105)  BP(mean): 108 (09 Apr 2025 10:40) (108 - 108)  RR: 18 (09 Apr 2025 11:44) (18 - 22)  SpO2: 99% (09 Apr 2025 11:44) (96% - 99%)    Parameters below as of 09 Apr 2025 11:44  Patient On (Oxygen Delivery Method): room air    General appearance: No acute distress, Awake, Alert  HEENT: Normocephalic, Atraumatic, Conjunctiva clear, EOMI  Neck: Supple, No JVD, No tenderness  Lungs: Breath sound equal bilaterally, No wheezes, No rales  Cardiovascular: S1S2, Regular rhythm  Abdomen: Soft, Nontender, Nondistended, No guarding/rebound, Positive bowel sounds  Extremities: No clubbing, No cyanosis, No calf tenderness, Lower extremity edema  Neuro: Strength equal bilaterally, No tremors  Psychiatric: Appropriate mood, Normal affect

## 2025-04-09 NOTE — ED PROVIDER NOTE - OBJECTIVE STATEMENT
Pt is a 73 yo F BIBEMS from home for sob and volume overload.  PMHx significant for CAD, HFrEF, htn, hld, CKD.  Pt recently admitted here for NSTEMI and volume overload and underwent PCI for RCA occlusion.  complicated after by acute kidney injury and ESBL pyelonephritis. Pt was discharged 9 d ago and has had significant weight gain and worsening sob on exertion.  MEMS readings were 31 and 35 on consecutive days with a goal of 14 despite being on torsemid 40 mg. Son spoke with HF team and was told to come to the ER for admission for diuresis.

## 2025-04-09 NOTE — ED ADULT TRIAGE NOTE - CHIEF COMPLAINT QUOTE
BIBEMS from cardiology office for elevated BNP to 70,000. Swelling noted to lower extremities. Pt also had a fall today landing on her right knee. +pain, no deformity. PT on blood thinners but had no head strike. Dyspnea on exertion.

## 2025-04-09 NOTE — H&P ADULT - HISTORY OF PRESENT ILLNESS
74F with a prior hospitalization for NSTEMI and bacteremia who presented with increased dyspnea on exertion. The patient denied any associated chest pain or palpitations but did not lower extremity swelling and increased weight of about 16 pounds since discharge from the hospital. She was also reported to have increased CardioMEMS reading of 31 and 25. The patient reported that she was compliant with her medications at home. There were no recent fevers, chills, or sick contacts.

## 2025-04-09 NOTE — ED ADULT NURSE NOTE - NSFALLHARMRISKINTERV_ED_ALL_ED

## 2025-04-09 NOTE — CONSULT NOTE ADULT - PROBLEM SELECTOR RECOMMENDATION 9
- ICM, LVEF 25-30% on 3/21 (previously 35-40%). Can repeat TTE.   - Clinically appears at least mildly hypervolemic on exam. Hypertensive.  - CardioMEMS PAD elevated 31.  - Diuretics: Bumex 2 mg IVP BID first dose stat.  - GDMT: Previously limited by hypotension on prior admission. BP now elevated. Recommend Hydralazine 50 mg TID, Toprol-XL 50 mg nightly. MRA held previously 2/2 hyperkalemia. No Farxiga 2/2 h/o pyelonephritis. Will attempt to optimize GDMT w/ ARNi in coming days.  - Please document strict I&Os and daily standing weight.  - Will continue to monitor her CardioMEMS readings.  - Device: s/p Micra PPM with chronic .

## 2025-04-09 NOTE — H&P ADULT - ASSESSMENT
74F with a history of coronary artery disease, heart failure, chronic kidney disease, hypertension, diabetes, and anemia who was recently hospitalized for NSTEMI with PCI and bacteremia who presented with dyspnea on exertion and weight gain.    Acute on chronic systolic heart failure  - BNP elevated 61981  - Recent echocardiogram noted ejection fraction of 31% with multiple segmental abnormalities  - For initiation of bumetanide for diuresis  - Monitoring urine output  - On metoprolol  - Heart Failure to evaluate the patient    Coronary artery disease  - Continue on aspirin, ticagrelor, and atorvastatin  - Recent cardiac catheterization with PCI  - Initial troponin elevated in setting of heart failure and kidney disease    Chronic kidney disease  - Renal function improved from prior values  - Continue to monitor while on diuresis    Diabetes  - Insulin coverage  - Close monitoring of blood glucose levels  - Gabapentin for neuropathy    Discussed with the patient and her family at the bedside          Dispo: Anticipate discharge home in 2-3 days pending response to treatment and clinical course 74F with a history of coronary artery disease, heart failure, chronic kidney disease, hypertension, diabetes, and anemia who was recently hospitalized for NSTEMI with PCI and bacteremia who presented with dyspnea on exertion and weight gain.    Acute on chronic systolic heart failure  - BNP elevated 68250  - Recent echocardiogram noted ejection fraction of 31% with multiple segmental abnormalities  - For initiation of bumetanide for diuresis  - Monitoring urine output  - On metoprolol  - Heart Failure to evaluate the patient    Coronary artery disease  - Continue on aspirin, ticagrelor, and atorvastatin  - Recent cardiac catheterization with PCI  - Initial troponin elevated in setting of heart failure and kidney disease    Chronic kidney disease  - Renal function improved from prior values  - Continue to monitor while on diuresis    Hyponatremia  - Hypervolemic on presentation  - Continue to monitor sodium levels  - Bumetanide for diuresis    Diabetes  - Insulin coverage  - Close monitoring of blood glucose levels  - Gabapentin for neuropathy    Discussed with the patient and her family at the bedside          Dispo: Anticipate discharge home in 2-3 days pending response to treatment and clinical course

## 2025-04-09 NOTE — ED PROVIDER NOTE - PHYSICAL EXAMINATION
Constitutional - well-developed.   Head - NCAT. Airway patent.   Eyes - PERRL.   CV - RRR. no murmur. B/L 2+ LE edema  Pulm - CTAB.   Abd - soft, nt. no rebound. no guarding.   Neuro - A&Ox3. strength 5/5 x4. sensation intact x4.   Skin - No rash. .  MSK - normal ROM.

## 2025-04-09 NOTE — CONSULT NOTE ADULT - ASSESSMENT
Gen Cards: Dr. Allen  HF: Dr. Polanco    73 y/o female with PMH of ICM/HFrEF (LVEF 35-40%), CAD s/p PCI, Mobitz II s/p PPM (MIRNA Dawn), HTN, HLD, PAD with chronic right LE wound, DM2, and CKD3, who was BIBA from CHF clinic due to hypervolemia, weight gain of ~20 lbs in the past 9 days despite escalation of PO diuretics.      She was recently admitted on 3/19/25 with c/o CP and found to have NSTEMI. She underwent LHC on 3/21 which showed mid-distal LAD severe stenosis, OM1 and OM2 severe stenosis, mid ISR 50%, distal ISR 99%, s/p PCI with 1 ZEINAB to RCA.  On 3/22 she had a fever with leukocytosis and her blood cultures were positive for E. Coli/ESBL. Her CT A/P showed left pyelonephritis. She also had an BRADY for which her Entresto was held. She was discharged home 3/31 and her CardioMEMS had been rising. She was advised to increase Torsemide 40 mg daily to twice daily on 4/4. Despite this, she has continued to gain weight and reports poor UOP response.     Admission labwork: Na 125, K 4.6, BUN 37.7, Cr 1.94, eGFR 27, Mg 2.3, proBNP 57389!  CXR: increased interstitial markings bilaterally, cardiomegaly.     Prior Cardiac Testing:  TTE 3/21/25: LVEF 25-30%, grade II DD, normal RV size/function, severe LAE, moderate MR, mild TR, PASP 58 mmHg ( RAP 8 mmHg), no evidence of LV thrombus, multiple segmental abnormalities/regional WMA.    CardioMEMS PAD (goal 14?):  4/9: 32  3/27: 26  3/24: 23  3/21: 22

## 2025-04-09 NOTE — CONSULT NOTE ADULT - PROBLEM SELECTOR RECOMMENDATION 2
- Most recent PCI 3/21/25  - Continue BB as above   - Continue daily ASA and Brilinta 90 mg BID  - Continue high intensity statin therapy - Atorvastatin 80mg nightly

## 2025-04-10 DIAGNOSIS — N18.30 CHRONIC KIDNEY DISEASE, STAGE 3 UNSPECIFIED: ICD-10-CM

## 2025-04-10 LAB
ANION GAP SERPL CALC-SCNC: 17 MMOL/L — SIGNIFICANT CHANGE UP (ref 5–17)
APPEARANCE UR: CLEAR — SIGNIFICANT CHANGE UP
BACTERIA # UR AUTO: ABNORMAL /HPF
BASOPHILS # BLD AUTO: 0.04 K/UL — SIGNIFICANT CHANGE UP (ref 0–0.2)
BASOPHILS NFR BLD AUTO: 0.4 % — SIGNIFICANT CHANGE UP (ref 0–2)
BILIRUB UR-MCNC: NEGATIVE — SIGNIFICANT CHANGE UP
BUN SERPL-MCNC: 32.7 MG/DL — HIGH (ref 8–20)
CALCIUM SERPL-MCNC: 7.8 MG/DL — LOW (ref 8.4–10.5)
CAST: 0 /LPF — SIGNIFICANT CHANGE UP (ref 0–4)
CHLORIDE SERPL-SCNC: 91 MMOL/L — LOW (ref 96–108)
CO2 SERPL-SCNC: 21 MMOL/L — LOW (ref 22–29)
COLOR SPEC: YELLOW — SIGNIFICANT CHANGE UP
CREAT SERPL-MCNC: 1.77 MG/DL — HIGH (ref 0.5–1.3)
DIFF PNL FLD: NEGATIVE — SIGNIFICANT CHANGE UP
EGFR: 30 ML/MIN/1.73M2 — LOW
EGFR: 30 ML/MIN/1.73M2 — LOW
EOSINOPHIL # BLD AUTO: 0.15 K/UL — SIGNIFICANT CHANGE UP (ref 0–0.5)
EOSINOPHIL NFR BLD AUTO: 1.4 % — SIGNIFICANT CHANGE UP (ref 0–6)
GLUCOSE BLDC GLUCOMTR-MCNC: 141 MG/DL — HIGH (ref 70–99)
GLUCOSE BLDC GLUCOMTR-MCNC: 179 MG/DL — HIGH (ref 70–99)
GLUCOSE BLDC GLUCOMTR-MCNC: 179 MG/DL — HIGH (ref 70–99)
GLUCOSE BLDC GLUCOMTR-MCNC: 70 MG/DL — SIGNIFICANT CHANGE UP (ref 70–99)
GLUCOSE BLDC GLUCOMTR-MCNC: 72 MG/DL — SIGNIFICANT CHANGE UP (ref 70–99)
GLUCOSE BLDC GLUCOMTR-MCNC: 75 MG/DL — SIGNIFICANT CHANGE UP (ref 70–99)
GLUCOSE SERPL-MCNC: 52 MG/DL — CRITICAL LOW (ref 70–99)
GLUCOSE UR QL: NEGATIVE MG/DL — SIGNIFICANT CHANGE UP
HCT VFR BLD CALC: 31.3 % — LOW (ref 34.5–45)
HCT VFR BLD CALC: 35.5 % — SIGNIFICANT CHANGE UP (ref 34.5–45)
HGB BLD-MCNC: 10 G/DL — LOW (ref 11.5–15.5)
HGB BLD-MCNC: 11.3 G/DL — LOW (ref 11.5–15.5)
IMM GRANULOCYTES # BLD AUTO: 0.21 K/UL — HIGH (ref 0–0.07)
IMM GRANULOCYTES NFR BLD AUTO: 2 % — HIGH (ref 0–0.9)
KETONES UR-MCNC: NEGATIVE MG/DL — SIGNIFICANT CHANGE UP
LEUKOCYTE ESTERASE UR-ACNC: ABNORMAL
LYMPHOCYTES # BLD AUTO: 0.72 K/UL — LOW (ref 1–3.3)
LYMPHOCYTES NFR BLD AUTO: 6.8 % — LOW (ref 13–44)
MCHC RBC-ENTMCNC: 28.3 PG — SIGNIFICANT CHANGE UP (ref 27–34)
MCHC RBC-ENTMCNC: 28.5 PG — SIGNIFICANT CHANGE UP (ref 27–34)
MCHC RBC-ENTMCNC: 31.8 G/DL — LOW (ref 32–36)
MCHC RBC-ENTMCNC: 31.9 G/DL — LOW (ref 32–36)
MCV RBC AUTO: 88.7 FL — SIGNIFICANT CHANGE UP (ref 80–100)
MCV RBC AUTO: 89.4 FL — SIGNIFICANT CHANGE UP (ref 80–100)
MONOCYTES # BLD AUTO: 1.19 K/UL — HIGH (ref 0–0.9)
MONOCYTES NFR BLD AUTO: 11.3 % — SIGNIFICANT CHANGE UP (ref 2–14)
NEUTROPHILS # BLD AUTO: 8.23 K/UL — HIGH (ref 1.8–7.4)
NEUTROPHILS NFR BLD AUTO: 78.1 % — HIGH (ref 43–77)
NITRITE UR-MCNC: NEGATIVE — SIGNIFICANT CHANGE UP
NRBC # BLD AUTO: 0 K/UL — SIGNIFICANT CHANGE UP (ref 0–0)
NRBC # BLD AUTO: 0 K/UL — SIGNIFICANT CHANGE UP (ref 0–0)
NRBC # FLD: 0 K/UL — SIGNIFICANT CHANGE UP (ref 0–0)
NRBC # FLD: 0 K/UL — SIGNIFICANT CHANGE UP (ref 0–0)
NRBC BLD AUTO-RTO: 0 /100 WBCS — SIGNIFICANT CHANGE UP (ref 0–0)
NRBC BLD AUTO-RTO: 0 /100 WBCS — SIGNIFICANT CHANGE UP (ref 0–0)
PH UR: 8.5 (ref 5–8)
PLATELET # BLD AUTO: 668 K/UL — HIGH (ref 150–400)
PLATELET # BLD AUTO: SIGNIFICANT CHANGE UP K/UL (ref 150–400)
PMV BLD: 9.5 FL — SIGNIFICANT CHANGE UP (ref 7–13)
POTASSIUM SERPL-MCNC: 4.2 MMOL/L — SIGNIFICANT CHANGE UP (ref 3.5–5.3)
POTASSIUM SERPL-SCNC: 4.2 MMOL/L — SIGNIFICANT CHANGE UP (ref 3.5–5.3)
PROT UR-MCNC: NEGATIVE MG/DL — SIGNIFICANT CHANGE UP
RBC # BLD: 3.53 M/UL — LOW (ref 3.8–5.2)
RBC # BLD: 3.97 M/UL — SIGNIFICANT CHANGE UP (ref 3.8–5.2)
RBC # FLD: 17.9 % — HIGH (ref 10.3–14.5)
RBC # FLD: 18.3 % — HIGH (ref 10.3–14.5)
RBC CASTS # UR COMP ASSIST: 1 /HPF — SIGNIFICANT CHANGE UP (ref 0–4)
SODIUM SERPL-SCNC: 129 MMOL/L — LOW (ref 135–145)
SP GR SPEC: 1.01 — SIGNIFICANT CHANGE UP (ref 1–1.03)
SQUAMOUS # UR AUTO: 0 /HPF — SIGNIFICANT CHANGE UP (ref 0–5)
UROBILINOGEN FLD QL: 1 MG/DL — SIGNIFICANT CHANGE UP (ref 0.2–1)
WBC # BLD: 10.54 K/UL — HIGH (ref 3.8–10.5)
WBC # BLD: 7.03 K/UL — SIGNIFICANT CHANGE UP (ref 3.8–10.5)
WBC # FLD AUTO: 10.54 K/UL — HIGH (ref 3.8–10.5)
WBC # FLD AUTO: 7.03 K/UL — SIGNIFICANT CHANGE UP (ref 3.8–10.5)
WBC UR QL: 1 /HPF — SIGNIFICANT CHANGE UP (ref 0–5)

## 2025-04-10 PROCEDURE — 71045 X-RAY EXAM CHEST 1 VIEW: CPT | Mod: 26

## 2025-04-10 PROCEDURE — 99232 SBSQ HOSP IP/OBS MODERATE 35: CPT

## 2025-04-10 PROCEDURE — 73562 X-RAY EXAM OF KNEE 3: CPT | Mod: 26,RT

## 2025-04-10 PROCEDURE — 99233 SBSQ HOSP IP/OBS HIGH 50: CPT

## 2025-04-10 RX ORDER — ACETAMINOPHEN 500 MG/5ML
1000 LIQUID (ML) ORAL ONCE
Refills: 0 | Status: COMPLETED | OUTPATIENT
Start: 2025-04-10 | End: 2025-04-10

## 2025-04-10 RX ORDER — DEXTROSE 50 % IN WATER 50 %
25 SYRINGE (ML) INTRAVENOUS ONCE
Refills: 0 | Status: COMPLETED | OUTPATIENT
Start: 2025-04-10 | End: 2025-04-10

## 2025-04-10 RX ORDER — BUMETANIDE 1 MG/1
2 TABLET ORAL EVERY 8 HOURS
Refills: 0 | Status: DISCONTINUED | OUTPATIENT
Start: 2025-04-10 | End: 2025-04-10

## 2025-04-10 RX ORDER — BUMETANIDE 1 MG/1
2 TABLET ORAL
Refills: 0 | Status: DISCONTINUED | OUTPATIENT
Start: 2025-04-10 | End: 2025-04-14

## 2025-04-10 RX ORDER — ISOSORBIDE MONONITRATE 60 MG/1
30 TABLET, EXTENDED RELEASE ORAL DAILY
Refills: 0 | Status: DISCONTINUED | OUTPATIENT
Start: 2025-04-10 | End: 2025-04-11

## 2025-04-10 RX ORDER — BUMETANIDE 1 MG/1
0.5 TABLET ORAL ONCE
Refills: 0 | Status: COMPLETED | OUTPATIENT
Start: 2025-04-10 | End: 2025-04-10

## 2025-04-10 RX ADMIN — BUMETANIDE 2 MILLIGRAM(S): 1 TABLET ORAL at 05:19

## 2025-04-10 RX ADMIN — BUMETANIDE 2 MILLIGRAM(S): 1 TABLET ORAL at 17:12

## 2025-04-10 RX ADMIN — METOPROLOL SUCCINATE 25 MILLIGRAM(S): 50 TABLET, EXTENDED RELEASE ORAL at 05:48

## 2025-04-10 RX ADMIN — GABAPENTIN 400 MILLIGRAM(S): 400 CAPSULE ORAL at 10:02

## 2025-04-10 RX ADMIN — Medication 325 MILLIGRAM(S): at 11:13

## 2025-04-10 RX ADMIN — Medication 25 GRAM(S): at 06:58

## 2025-04-10 RX ADMIN — Medication 50 MILLIGRAM(S): at 22:02

## 2025-04-10 RX ADMIN — ISOSORBIDE MONONITRATE 30 MILLIGRAM(S): 60 TABLET, EXTENDED RELEASE ORAL at 12:47

## 2025-04-10 RX ADMIN — INSULIN GLARGINE-YFGN 25 UNIT(S): 100 INJECTION, SOLUTION SUBCUTANEOUS at 22:02

## 2025-04-10 RX ADMIN — TICAGRELOR 60 MILLIGRAM(S): 90 TABLET ORAL at 05:48

## 2025-04-10 RX ADMIN — BUMETANIDE 0.5 MILLIGRAM(S): 1 TABLET ORAL at 23:39

## 2025-04-10 RX ADMIN — Medication 50 MILLIGRAM(S): at 14:36

## 2025-04-10 RX ADMIN — Medication 325 MILLIGRAM(S): at 12:13

## 2025-04-10 RX ADMIN — TICAGRELOR 60 MILLIGRAM(S): 90 TABLET ORAL at 17:49

## 2025-04-10 RX ADMIN — Medication 400 MILLIGRAM(S): at 22:00

## 2025-04-10 RX ADMIN — ATORVASTATIN CALCIUM 80 MILLIGRAM(S): 80 TABLET, FILM COATED ORAL at 22:02

## 2025-04-10 RX ADMIN — METOPROLOL SUCCINATE 25 MILLIGRAM(S): 50 TABLET, EXTENDED RELEASE ORAL at 17:49

## 2025-04-10 RX ADMIN — Medication 325 MILLIGRAM(S): at 17:49

## 2025-04-10 RX ADMIN — Medication 81 MILLIGRAM(S): at 10:02

## 2025-04-10 RX ADMIN — BUMETANIDE 2 MILLIGRAM(S): 1 TABLET ORAL at 12:47

## 2025-04-10 RX ADMIN — Medication 50 MILLIGRAM(S): at 05:48

## 2025-04-10 NOTE — CHART NOTE - NSCHARTNOTEFT_GEN_A_CORE
Over night pt had an oral temp of 100.4F. All other VSS.   Pt is here for Over night pt had an oral temp of 100.4F. All other VSS.   Pt is here for Acute on chronic systolic heart failure. Has a h/o of bacteremia on previous admissions. Pt seen at bedside. Denies SOB, headache, chest pain or abdominal pain.     Vital Signs Last 24 Hrs  T(C): 38 (10 Apr 2025 20:05), Max: 38 (10 Apr 2025 20:05)  T(F): 100.4 (10 Apr 2025 20:05), Max: 100.4 (10 Apr 2025 20:05)  HR: 70 (10 Apr 2025 20:05) (60 - 70)  BP: 154/66 (10 Apr 2025 20:05) (142/74 - 163/78)  BP(mean): --  RR: 18 (10 Apr 2025 20:05) (18 - 18)  SpO2: 98% (10 Apr 2025 20:05) (96% - 99%)    Parameters below as of 10 Apr 2025 20:05  Patient On (Oxygen Delivery Method): room air    PHYSICAL EXAM:  GENERAL: Patient appears well, lying in bed; NAD, appropriate, pleasant  LUNGS: Good air entry bilaterally, clear to auscultation b/l, symmetric breath sounds; No wheezing or rhonchi appreciated, unlabored respirations  HEART: Regular rate and rhythm; No murmurs, rubs, or gallops  GASTROINTESTINAL: no guarding or rigidity; Abdomen is soft, nontender, and nondistended    73 y/o female here for acute on chronic CHF. Overnight pt has a temp of 100.4F. Other VSS and no symptoms.   - CXR, UA, RVP  - CBC, CMP  - Blood cultures ordered   - Ofirmev x1 dose ordered, RN to give IV dose over PO Tylenol when dose is due  - RN to call with pts temp 1 hr s/p Ofirmev to monitor fever Over night pt had an oral temp of 100.4F. All other VSS.   Pt is here for Acute on chronic systolic heart failure. Has a h/o of bacteremia on previous admissions. Pt seen at bedside. Denies SOB, headache, chest pain or abdominal pain.     Vital Signs Last 24 Hrs  T(C): 38 (10 Apr 2025 20:05), Max: 38 (10 Apr 2025 20:05)  T(F): 100.4 (10 Apr 2025 20:05), Max: 100.4 (10 Apr 2025 20:05)  HR: 70 (10 Apr 2025 20:05) (60 - 70)  BP: 154/66 (10 Apr 2025 20:05) (142/74 - 163/78)  BP(mean): --  RR: 18 (10 Apr 2025 20:05) (18 - 18)  SpO2: 98% (10 Apr 2025 20:05) (96% - 99%)    Parameters below as of 10 Apr 2025 20:05  Patient On (Oxygen Delivery Method): room air    PHYSICAL EXAM:  GENERAL: Patient appears well, lying in bed; NAD, appropriate, pleasant  LUNGS: Good air entry bilaterally, clear to auscultation b/l, symmetric breath sounds; No wheezing or rhonchi appreciated, unlabored respirations  HEART: Regular rate and rhythm; No murmurs, rubs, or gallops  GASTROINTESTINAL: no guarding or rigidity; Abdomen is soft, nontender, and nondistended    75 y/o female here for acute on chronic CHF. Overnight pt has a temp of 100.4F. Other VSS and no symptoms.   - CXR, UA, RVP  - CBC, CMP  - Blood cultures ordered   - Ofirmev x1 dose ordered, RN to give IV dose over PO Tylenol when dose is due  - RN to call with pts temp 1 hr s/p Ofirmev to monitor fever    10:37pm   - CXR shows   - 0.5 Bumex IV push Over night pt had an oral temp of 100.4F. All other VSS.   Pt is here for Acute on chronic systolic heart failure. Has a h/o of bacteremia on previous admissions. Pt seen at bedside. Denies SOB, headache, chest pain or abdominal pain.     Vital Signs Last 24 Hrs  T(C): 38 (10 Apr 2025 20:05), Max: 38 (10 Apr 2025 20:05)  T(F): 100.4 (10 Apr 2025 20:05), Max: 100.4 (10 Apr 2025 20:05)  HR: 70 (10 Apr 2025 20:05) (60 - 70)  BP: 154/66 (10 Apr 2025 20:05) (142/74 - 163/78)  BP(mean): --  RR: 18 (10 Apr 2025 20:05) (18 - 18)  SpO2: 98% (10 Apr 2025 20:05) (96% - 99%)    Parameters below as of 10 Apr 2025 20:05  Patient On (Oxygen Delivery Method): room air    PHYSICAL EXAM:  GENERAL: Patient appears well, lying in bed; NAD, appropriate, pleasant  LUNGS: Good air entry bilaterally, clear to auscultation b/l, symmetric breath sounds; No wheezing or rhonchi appreciated, unlabored respirations  HEART: Regular rate and rhythm; No murmurs, rubs, or gallops  GASTROINTESTINAL: no guarding or rigidity; Abdomen is soft, nontender, and nondistended    73 y/o female here for acute on chronic CHF. Overnight pt has a temp of 100.4F. Other VSS and no symptoms.   - CXR, UA, RVP  - CBC, CMP  - Blood cultures ordered   - Ofirmev x1 dose ordered, RN to give IV dose over PO Tylenol when dose is due  - RN to call with pts temp 1 hr s/p Ofirmev to monitor fever    10:37pm   - CXR shows consolidation, admitted for acute on chronic HF, pt still symptomatic, VSS  - 0.5 Bumex IV push for possible fluid overload  - RVP pending, will give broad spectrum AB if negative Over night pt had an oral temp of 100.4F. All other VSS.   Pt is here for Acute on chronic systolic heart failure. Has a h/o of bacteremia on previous admissions. Pt seen at bedside. Denies SOB, headache, chest pain or abdominal pain.     Vital Signs Last 24 Hrs  T(C): 38 (10 Apr 2025 20:05), Max: 38 (10 Apr 2025 20:05)  T(F): 100.4 (10 Apr 2025 20:05), Max: 100.4 (10 Apr 2025 20:05)  HR: 70 (10 Apr 2025 20:05) (60 - 70)  BP: 154/66 (10 Apr 2025 20:05) (142/74 - 163/78)  BP(mean): --  RR: 18 (10 Apr 2025 20:05) (18 - 18)  SpO2: 98% (10 Apr 2025 20:05) (96% - 99%)    Parameters below as of 10 Apr 2025 20:05  Patient On (Oxygen Delivery Method): room air    PHYSICAL EXAM:  GENERAL: Patient appears well, lying in bed; NAD, appropriate, pleasant  LUNGS: Good air entry bilaterally, clear to auscultation b/l, symmetric breath sounds; No wheezing or rhonchi appreciated, unlabored respirations  HEART: Regular rate and rhythm; No murmurs, rubs, or gallops  GASTROINTESTINAL: no guarding or rigidity; Abdomen is soft, nontender, and nondistended    75 y/o female here for acute on chronic CHF. Overnight pt has a temp of 100.4F. Other VSS and no symptoms.   - CXR, UA, RVP  - CBC, CMP  - Blood cultures ordered   - Ofirmev x1 dose ordered, RN to give IV dose over PO Tylenol when dose is due  - RN to call with pts temp 1 hr s/p Ofirmev to monitor fever    10:37pm   - CXR shows consolidation, admitted for acute on chronic HF, pt still symptomatic, VSS  - 0.5 Bumex IV push for possible fluid overload  - RVP negative, UA negative  - Pt started on empiric zosyn   - Will till AM ID team to make adjustments if needed

## 2025-04-10 NOTE — PHYSICAL THERAPY INITIAL EVALUATION ADULT - PHYSICAL ASSIST/NONPHYSICAL ASSIST: SCOOT/BRIDGE, REHAB EVAL
Cardiac Clearance        Physician or Practice Requesting:[facility]   : Yuri Agrawalnicole Phone Number: [phone]  Fax Number: [phone]  Date of Surgery/Procedure: TBD  Type of Surgery or Procedure: [place]/EAD  Type of Anesthesia: na  Type of Clearance Requested: Medication Hold Only  Medication to Hold:Eliquis  Days to Hold: 2 days prior 2 person assist

## 2025-04-10 NOTE — PHYSICAL THERAPY INITIAL EVALUATION ADULT - MANUAL MUSCLE TESTING RESULTS, REHAB EVAL
bilateral shoulder flex, elbow flex WFL; left hip flex, knee flex, ankle df WFL; right hip/knee NT due to pt in too much pain to move, right ankle df 4/5

## 2025-04-10 NOTE — PHYSICAL THERAPY INITIAL EVALUATION ADULT - PASSIVE RANGE OF MOTION EXAMINATION, REHAB EVAL
right hip/ankle WFL; pt unable to actively move right knee due to pain/bilateral upper extremity Passive ROM was WFL (within functional limits)/Left LE Passive ROM was WFL (within functional limits)

## 2025-04-10 NOTE — PROGRESS NOTE ADULT - ASSESSMENT
74F with a history of coronary artery disease, heart failure, chronic kidney disease, hypertension, diabetes, and anemia who was recently hospitalized for NSTEMI with PCI and bacteremia who presented with dyspnea on exertion and weight gain.    #Acute on chronic systolic heart failure  Tele monitoring  ProBNP noted to be elevated  Recent echocardiogram noted ejection fraction of 31% with multiple segmental abnormalities  IV bumex increased  Strict in and out  Daily weights  On metoprolol  Heart Failure on board    #Coronary artery disease  Continue on aspirin, ticagrelor, and atorvastatin  Recent cardiac catheterization with PCI  Initial troponin elevated in setting of heart failure and kidney disease    #Chronic kidney disease  Renal function improved from prior values  Continue to monitor while on diuresis    #Hyponatremia  Hypervolemic on presentation  Continue to monitor sodium levels  Bumetanide for diuresis    #Diabetes  Insulin coverage  Close monitoring of blood glucose levels  Gabapentin for neuropathy    #Right knee pain  Will obtain Right knee x-ray    Discussed with the patient and her family at the bedside      Dispo: Pending HF optimization

## 2025-04-10 NOTE — PROGRESS NOTE ADULT - PROBLEM SELECTOR PLAN 1
- ICM, LVEF 25-30% on 3/21 (previously 35-40%). Can repeat TTE once more optimized.   - Clinically appears at least mildly hypervolemic on exam. Hypertensive.  - CardioMEMS PAD elevated on admission 31-33  - Serum proBNP >68K  - Hyponatremia - improving  - Diuretics: Increase Bumex to 2 mg IVP TID  - GDMT: Start Imdur 30 mg daily. Continue Hydralazine 50 mg TID, Toprol-XL 50 mg nightly. MRA held previously 2/2 hyperkalemia. No Farxiga 2/2 h/o pyelonephritis. Will attempt to optimize GDMT w/ ARNi in coming days.  - Device: s/p Micra PPM with chronic   - Low Na, 1.5L FR diet recommended  - Please document strict I&Os and daily standing weight.  - Will continue to monitor her CardioMEMS readings.

## 2025-04-10 NOTE — PHYSICAL THERAPY INITIAL EVALUATION ADULT - ACTIVE RANGE OF MOTION EXAMINATION, REHAB EVAL
right hip/ankle WFL; pt unable to actively move right knee due to pain/bilateral upper extremity Active ROM was WFL (within functional limits)/Left LE Active ROM was WFL (within functional limits)

## 2025-04-10 NOTE — PHYSICAL THERAPY INITIAL EVALUATION ADULT - LEVEL OF INDEPENDENCE: BED TO CHAIR, REHAB EVAL
pt unable to tolerate right LE in dependent position over side of bed/unable to perform Was done for at least one hour

## 2025-04-10 NOTE — PROGRESS NOTE ADULT - SUBJECTIVE AND OBJECTIVE BOX
NOTE INCOMPLETE    [facility] Advanced Heart Failure - Progress Note  [address] [place] [address]  Office Phone: [phone]/Fax: [phone]  Service/On Call Phone [phone]    Subjective/Objective:    Medications:  acetaminophen     Tablet .. 325 milliGRAM(s) Oral every 4 hours PRN  aspirin  chewable 81 milliGRAM(s) Oral daily  atorvastatin 80 milliGRAM(s) Oral at bedtime  buMETAnide Injectable 2 milliGRAM(s) IV Push <User Schedule>  dextrose 5%. 1000 milliLiter(s) IV Continuous <Continuous>  dextrose 5%. 1000 milliLiter(s) IV Continuous <Continuous>  dextrose 50% Injectable 25 Gram(s) IV Push once  dextrose 50% Injectable 12.5 Gram(s) IV Push once  dextrose 50% Injectable 25 Gram(s) IV Push once  dextrose Oral Gel 15 Gram(s) Oral once PRN  gabapentin 400 milliGRAM(s) Oral daily  glucagon  Injectable 1 milliGRAM(s) IntraMuscular once  hydrALAZINE 50 milliGRAM(s) Oral three times a day  insulin glargine Injectable (LANTUS) 25 Unit(s) SubCutaneous at bedtime  insulin lispro (ADMELOG) corrective regimen sliding scale   SubCutaneous three times a day before meals  isosorbide   mononitrate ER Tablet (IMDUR) 30 milliGRAM(s) Oral daily  metoprolol tartrate 25 milliGRAM(s) Oral two times a day  ticagrelor 60 milliGRAM(s) Oral every 12 hours    Vital Signs Last 24 Hours  T(C): 36.6 (04-10-25 @ 11:34), Max: 36.9 (04-09-25 @ 16:08)  HR: 62 (04-10-25 @ 11:34) (61 - 68)  BP: 154/82 (04-10-25 @ 11:34) (131/- - 163/78)  RR: 18 (04-10-25 @ 11:34) (18 - 18)  SpO2: 99% (04-10-25 @ 11:34) (97% - 99%)    I&O's Summary  10 Apr 2025 07:01  -  10 Apr 2025 13:38  --------------------------------------------------------  IN: 0 mL / OUT: 1000 mL / NET: -1000 mL    Tele:    Physical Exam:  General: In no distress.   HEENT: EOMs intact.  Neck: Neck supple. JVP not elevated.   Chest: Clear to auscultation bilaterally.  CV: RRR. Normal S1 and S2. No murmurs, rub, or gallops. Warm peripherally.  PV: No LE edema noted. Pulses full/equal in all four extremities.  Abdomen: Soft, non-distended, non-tender.  Skin: warm, dry, no rash.  Neurology: Alert and oriented times three. Sensation intact.  Psych: Appropriate affect.    Labs:                        11.3   10.54 )-----------( 668      ( 10 Apr 2025 06:54 )             35.5     04-10    129[L]  |  91[L]  |  32.7[H]  ----------------------------<  52[LL]  4.2   |  21.0[L]  |  1.77[H]    Ca    7.8[L]      10 Apr 2025 04:00  Mg     2.3     04-09    TPro  7.1  /  Alb  3.3  /  TBili  0.7  /  DBili  x   /  AST  34[H]  /  ALT  34[H]  /  AlkPhos  148[H]  04-09                       [facility] Advanced Heart Failure - Progress Note  [address] [place] [address]  Office Phone: [phone]/Fax: [phone]  Service/On Call Phone [phone]    Subjective/Objective: No acute events overnight.     Medications:  acetaminophen     Tablet .. 325 milliGRAM(s) Oral every 4 hours PRN  aspirin  chewable 81 milliGRAM(s) Oral daily  atorvastatin 80 milliGRAM(s) Oral at bedtime  buMETAnide Injectable 2 milliGRAM(s) IV Push <User Schedule>  dextrose 5%. 1000 milliLiter(s) IV Continuous <Continuous>  dextrose 5%. 1000 milliLiter(s) IV Continuous <Continuous>  dextrose 50% Injectable 25 Gram(s) IV Push once  dextrose 50% Injectable 12.5 Gram(s) IV Push once  dextrose 50% Injectable 25 Gram(s) IV Push once  dextrose Oral Gel 15 Gram(s) Oral once PRN  gabapentin 400 milliGRAM(s) Oral daily  glucagon  Injectable 1 milliGRAM(s) IntraMuscular once  hydrALAZINE 50 milliGRAM(s) Oral three times a day  insulin glargine Injectable (LANTUS) 25 Unit(s) SubCutaneous at bedtime  insulin lispro (ADMELOG) corrective regimen sliding scale   SubCutaneous three times a day before meals  isosorbide   mononitrate ER Tablet (IMDUR) 30 milliGRAM(s) Oral daily  metoprolol tartrate 25 milliGRAM(s) Oral two times a day  ticagrelor 60 milliGRAM(s) Oral every 12 hours    Vital Signs Last 24 Hours  T(C): 36.6 (04-10-25 @ 11:34), Max: 36.9 (04-09-25 @ 16:08)  HR: 62 (04-10-25 @ 11:34) (61 - 68)  BP: 154/82 (04-10-25 @ 11:34) (131/- - 163/78)  RR: 18 (04-10-25 @ 11:34) (18 - 18)  SpO2: 99% (04-10-25 @ 11:34) (97% - 99%)    I&O's Summary  10 Apr 2025 07:01  -  10 Apr 2025 13:38  --------------------------------------------------------  IN: 0 mL / OUT: 1000 mL / NET: -1000 mL    Tele:     Physical Exam:  General: In no distress.   Neck: Neck supple. JVP not elevated.   Chest: RLL crackles.  CV: RRR. Normal S1 and S2. No murmurs, rub, or gallops. Warm peripherally.  PV: 1+ B/L LE edema.   Abdomen: Soft, non-distended, non-tender.  Skin: warm, dry.  Neurology: Alert and oriented times three. Sensation intact.  Psych: Appropriate affect.    Labs:                        11.3   10.54 )-----------( 668      ( 10 Apr 2025 06:54 )             35.5     04-10    129[L]  |  91[L]  |  32.7[H]  ----------------------------<  52[LL]  4.2   |  21.0[L]  |  1.77[H]    Ca    7.8[L]      10 Apr 2025 04:00  Mg     2.3     04-09    TPro  7.1  /  Alb  3.3  /  TBili  0.7  /  DBili  x   /  AST  34[H]  /  ALT  34[H]  /  AlkPhos  148[H]  04-09                       [facility] Advanced Heart Failure - Progress Note  [address] [place] [address]  Office Phone: [phone]/Fax: [phone]  Service/On Call Phone [phone]    Subjective/Objective:  (ID# [id number]). No acute events overnight. Laying supine in bed in no distress. Complaints of right leg/knee pain secondary to fall.     Medications:  acetaminophen     Tablet .. 325 milliGRAM(s) Oral every 4 hours PRN  aspirin  chewable 81 milliGRAM(s) Oral daily  atorvastatin 80 milliGRAM(s) Oral at bedtime  buMETAnide Injectable 2 milliGRAM(s) IV Push <User Schedule>  dextrose 5%. 1000 milliLiter(s) IV Continuous <Continuous>  dextrose 5%. 1000 milliLiter(s) IV Continuous <Continuous>  dextrose 50% Injectable 25 Gram(s) IV Push once  dextrose 50% Injectable 12.5 Gram(s) IV Push once  dextrose 50% Injectable 25 Gram(s) IV Push once  dextrose Oral Gel 15 Gram(s) Oral once PRN  gabapentin 400 milliGRAM(s) Oral daily  glucagon  Injectable 1 milliGRAM(s) IntraMuscular once  hydrALAZINE 50 milliGRAM(s) Oral three times a day  insulin glargine Injectable (LANTUS) 25 Unit(s) SubCutaneous at bedtime  insulin lispro (ADMELOG) corrective regimen sliding scale   SubCutaneous three times a day before meals  isosorbide   mononitrate ER Tablet (IMDUR) 30 milliGRAM(s) Oral daily  metoprolol tartrate 25 milliGRAM(s) Oral two times a day  ticagrelor 60 milliGRAM(s) Oral every 12 hours    Vital Signs Last 24 Hours  T(C): 36.6 (04-10-25 @ 11:34), Max: 36.9 (04-09-25 @ 16:08)  HR: 62 (04-10-25 @ 11:34) (61 - 68)  BP: 154/82 (04-10-25 @ 11:34) (131/- - 163/78)  RR: 18 (04-10-25 @ 11:34) (18 - 18)  SpO2: 99% (04-10-25 @ 11:34) (97% - 99%)    I&O's Summary  10 Apr 2025 07:01  -  10 Apr 2025 13:38  --------------------------------------------------------  IN: 0 mL / OUT: 1000 mL / NET: -1000 mL    Tele:     Physical Exam:  General: In no distress.   Neck: Neck supple. JVP not elevated.   Chest: RLL crackles.  CV: RRR. Normal S1 and S2. No murmurs, rub, or gallops. Warm peripherally.  PV: 1+ B/L LE edema.   Abdomen: Soft, non-distended, non-tender.  Skin: warm, dry.  Neurology: Alert and oriented times three. Sensation intact.  Psych: Appropriate affect.    Labs:                        11.3   10.54 )-----------( 668      ( 10 Apr 2025 06:54 )             35.5     04-10    129[L]  |  91[L]  |  32.7[H]  ----------------------------<  52[LL]  4.2   |  21.0[L]  |  1.77[H]    Ca    7.8[L]      10 Apr 2025 04:00  Mg     2.3     04-09    TPro  7.1  /  Alb  3.3  /  TBili  0.7  /  DBili  x   /  AST  34[H]  /  ALT  34[H]  /  AlkPhos  148[H]  04-09

## 2025-04-10 NOTE — PHYSICAL THERAPY INITIAL EVALUATION ADULT - ADDITIONAL COMMENTS
pt lives with her son in a 1-story house with no steps to enter and none inside. has a cane and RW she uses for amb. states someone always home with pt.

## 2025-04-10 NOTE — PROGRESS NOTE ADULT - PROBLEM SELECTOR PLAN 2
- Most recent PCI 3/21/25  - Continue BB as above   - Continue daily ASA and Brilinta 90 mg BID  - Continue high intensity statin therapy - Atorvastatin 80mg nightly.

## 2025-04-10 NOTE — PROGRESS NOTE ADULT - ASSESSMENT
Gen Cards: Dr. Allen  HF: Dr. Polanco    73 y/o female with PMH of ICM/HFrEF (LVEF 35-40%), CAD s/p PCI, Mobitz II s/p PPM (MIRNA Dawn), HTN, HLD, PAD with chronic right LE wound, DM2, and CKD3, who was BIBA from CHF clinic due to hypervolemia, weight gain of ~20 lbs in the past 9 days despite escalation of PO diuretics.      She was recently admitted on 3/19/25 with c/o CP and found to have NSTEMI. She underwent LHC on 3/21 which showed mid-distal LAD severe stenosis, OM1 and OM2 severe stenosis, mid ISR 50%, distal ISR 99%, s/p PCI with 1 ZEINAB to RCA.  On 3/22 she had a fever with leukocytosis and her blood cultures were positive for E. Coli/ESBL. Her CT A/P showed left pyelonephritis. She also had an BRADY for which her Entresto was held. She was discharged home 3/31 and her CardioMEMS had been rising. She was advised to increase Torsemide 40 mg daily to twice daily on 4/4. Despite this, she has continued to gain weight and reports poor UOP response.     Admission labwork: Na 125, K 4.6, BUN 37.7, Cr 1.94, eGFR 27, Mg 2.3, proBNP 99772!  CXR: increased interstitial markings bilaterally, cardiomegaly.     Prior Cardiac Testing:  TTE 3/21/25: LVEF 25-30%, grade II DD, normal RV size/function, severe LAE, moderate MR, mild TR, PASP 58 mmHg ( RAP 8 mmHg), no evidence of LV thrombus, multiple segmental abnormalities/regional WMA.    CardioMEMS PAD (goal 14?):  4/9: 32  3/27: 26  3/24: 23  3/21: 22

## 2025-04-10 NOTE — PROGRESS NOTE ADULT - SUBJECTIVE AND OBJECTIVE BOX
Hospitalist Daily Progress Note    Chief Complaint:  Patient is a 74y old  Female who presents with a chief complaint of CHF (09 Apr 2025 13:11)      SUBJECTIVE / OVERNIGHT EVENTS:  Patient was seen and examined at bedside. Omani translated at bedside  Complains of right knee pain 2/2 fall at outpatient Dr office  Patient denies chest pain, SOB, abd pain, N/V, fever, chills, dysuria or any other complaints. All remainder ROS negative.     MEDICATIONS  (STANDING):  aspirin  chewable 81 milliGRAM(s) Oral daily  atorvastatin 80 milliGRAM(s) Oral at bedtime  buMETAnide Injectable 2 milliGRAM(s) IV Push <User Schedule>  dextrose 5%. 1000 milliLiter(s) (50 mL/Hr) IV Continuous <Continuous>  dextrose 5%. 1000 milliLiter(s) (100 mL/Hr) IV Continuous <Continuous>  dextrose 50% Injectable 25 Gram(s) IV Push once  dextrose 50% Injectable 12.5 Gram(s) IV Push once  dextrose 50% Injectable 25 Gram(s) IV Push once  gabapentin 400 milliGRAM(s) Oral daily  glucagon  Injectable 1 milliGRAM(s) IntraMuscular once  hydrALAZINE 50 milliGRAM(s) Oral three times a day  insulin glargine Injectable (LANTUS) 25 Unit(s) SubCutaneous at bedtime  insulin lispro (ADMELOG) corrective regimen sliding scale   SubCutaneous three times a day before meals  isosorbide   mononitrate ER Tablet (IMDUR) 30 milliGRAM(s) Oral daily  metoprolol tartrate 25 milliGRAM(s) Oral two times a day  ticagrelor 60 milliGRAM(s) Oral every 12 hours    MEDICATIONS  (PRN):  acetaminophen     Tablet .. 325 milliGRAM(s) Oral every 4 hours PRN Temp greater or equal to 38C (100.4F), Mild Pain (1 - 3)  dextrose Oral Gel 15 Gram(s) Oral once PRN Blood Glucose LESS THAN 70 milliGRAM(s)/deciliter        I&O's Summary    10 Apr 2025 07:01  -  10 Apr 2025 12:15  --------------------------------------------------------  IN: 0 mL / OUT: 1000 mL / NET: -1000 mL        PHYSICAL EXAM:  Vital Signs Last 24 Hrs  T(C): 36.6 (10 Apr 2025 11:34), Max: 36.9 (09 Apr 2025 16:08)  T(F): 97.9 (10 Apr 2025 11:34), Max: 98.5 (09 Apr 2025 16:08)  HR: 62 (10 Apr 2025 11:34) (61 - 68)  BP: 154/82 (10 Apr 2025 11:34) (131/- - 163/78)  BP(mean): 105 (09 Apr 2025 16:08) (105 - 105)  RR: 18 (10 Apr 2025 11:34) (18 - 18)  SpO2: 99% (10 Apr 2025 11:34) (97% - 99%)    Parameters below as of 10 Apr 2025 11:34  Patient On (Oxygen Delivery Method): room air      Constitutional: NAD, Resting  ENT: Supple, No JVD  Lungs: Some crackles at bases  Cardio: RRR, S1/S2, No murmur  Abdomen: Soft, Nontender, Nondistended; Bowel sounds present  Extremities: No calf tenderness, B/L LE edema, RLE knee swelling  Musculoskeletal:   No joint swelling  Psych: Calm, cooperative affect appropriate  Neuro: Awake and alert, oriented x4  Skin: No rashes; no palpable lesions    LABS:                        11.3   10.54 )-----------( 668      ( 10 Apr 2025 06:54 )             35.5     04-10    129[L]  |  91[L]  |  32.7[H]  ----------------------------<  52[LL]  4.2   |  21.0[L]  |  1.77[H]    Ca    7.8[L]      10 Apr 2025 04:00  Mg     2.3     04-09    TPro  7.1  /  Alb  3.3  /  TBili  0.7  /  DBili  x   /  AST  34[H]  /  ALT  34[H]  /  AlkPhos  148[H]  04-09          Urinalysis Basic - ( 10 Apr 2025 04:00 )    Color: x / Appearance: x / SG: x / pH: x  Gluc: 52 mg/dL / Ketone: x  / Bili: x / Urobili: x   Blood: x / Protein: x / Nitrite: x   Leuk Esterase: x / RBC: x / WBC x   Sq Epi: x / Non Sq Epi: x / Bacteria: x        CAPILLARY BLOOD GLUCOSE      POCT Blood Glucose.: 141 mg/dL (10 Apr 2025 12:05)  POCT Blood Glucose.: 179 mg/dL (10 Apr 2025 07:53)  POCT Blood Glucose.: 75 mg/dL (10 Apr 2025 06:49)  POCT Blood Glucose.: 70 mg/dL (10 Apr 2025 06:34)  POCT Blood Glucose.: 72 mg/dL (10 Apr 2025 06:32)  POCT Blood Glucose.: 103 mg/dL (09 Apr 2025 21:49)  POCT Blood Glucose.: 189 mg/dL (09 Apr 2025 17:30)        RADIOLOGY REVIEWED

## 2025-04-11 DIAGNOSIS — R50.9 FEVER, UNSPECIFIED: ICD-10-CM

## 2025-04-11 LAB
ALBUMIN SERPL ELPH-MCNC: 2.9 G/DL — LOW (ref 3.3–5.2)
ALP SERPL-CCNC: 135 U/L — HIGH (ref 40–120)
ALT FLD-CCNC: 21 U/L — SIGNIFICANT CHANGE UP
ANION GAP SERPL CALC-SCNC: 15 MMOL/L — SIGNIFICANT CHANGE UP (ref 5–17)
APPEARANCE UR: CLEAR — SIGNIFICANT CHANGE UP
AST SERPL-CCNC: 16 U/L — SIGNIFICANT CHANGE UP
BACTERIA # UR AUTO: ABNORMAL /HPF
BILIRUB SERPL-MCNC: 0.6 MG/DL — SIGNIFICANT CHANGE UP (ref 0.4–2)
BILIRUB UR-MCNC: NEGATIVE — SIGNIFICANT CHANGE UP
BUN SERPL-MCNC: 30.1 MG/DL — HIGH (ref 8–20)
CALCIUM SERPL-MCNC: 6.9 MG/DL — LOW (ref 8.4–10.5)
CHLORIDE SERPL-SCNC: 90 MMOL/L — LOW (ref 96–108)
CO2 SERPL-SCNC: 22 MMOL/L — SIGNIFICANT CHANGE UP (ref 22–29)
COLOR SPEC: YELLOW — SIGNIFICANT CHANGE UP
CREAT ?TM UR-MCNC: 23 MG/DL — SIGNIFICANT CHANGE UP
CREAT SERPL-MCNC: 1.66 MG/DL — HIGH (ref 0.5–1.3)
DIFF PNL FLD: NEGATIVE — SIGNIFICANT CHANGE UP
EGFR: 33 ML/MIN/1.73M2 — LOW
EGFR: 33 ML/MIN/1.73M2 — LOW
EPI CELLS # UR: PRESENT
GLUCOSE BLDC GLUCOMTR-MCNC: 149 MG/DL — HIGH (ref 70–99)
GLUCOSE BLDC GLUCOMTR-MCNC: 168 MG/DL — HIGH (ref 70–99)
GLUCOSE BLDC GLUCOMTR-MCNC: 181 MG/DL — HIGH (ref 70–99)
GLUCOSE BLDC GLUCOMTR-MCNC: 209 MG/DL — HIGH (ref 70–99)
GLUCOSE SERPL-MCNC: 163 MG/DL — HIGH (ref 70–99)
GLUCOSE UR QL: 100 MG/DL
HCT VFR BLD CALC: 29.1 % — LOW (ref 34.5–45)
HGB BLD-MCNC: 9.5 G/DL — LOW (ref 11.5–15.5)
KETONES UR-MCNC: NEGATIVE MG/DL — SIGNIFICANT CHANGE UP
LEUKOCYTE ESTERASE UR-ACNC: ABNORMAL
MCHC RBC-ENTMCNC: 28.8 PG — SIGNIFICANT CHANGE UP (ref 27–34)
MCHC RBC-ENTMCNC: 32.6 G/DL — SIGNIFICANT CHANGE UP (ref 32–36)
MCV RBC AUTO: 88.2 FL — SIGNIFICANT CHANGE UP (ref 80–100)
NITRITE UR-MCNC: NEGATIVE — SIGNIFICANT CHANGE UP
NRBC # BLD AUTO: 0 K/UL — SIGNIFICANT CHANGE UP (ref 0–0)
NRBC # FLD: 0 K/UL — SIGNIFICANT CHANGE UP (ref 0–0)
NRBC BLD AUTO-RTO: 0 /100 WBCS — SIGNIFICANT CHANGE UP (ref 0–0)
OSMOLALITY UR: 229 MOSM/KG — LOW (ref 300–1000)
PH UR: 8 — SIGNIFICANT CHANGE UP (ref 5–8)
PLATELET # BLD AUTO: 561 K/UL — HIGH (ref 150–400)
PMV BLD: 9.7 FL — SIGNIFICANT CHANGE UP (ref 7–13)
POTASSIUM SERPL-MCNC: 4 MMOL/L — SIGNIFICANT CHANGE UP (ref 3.5–5.3)
POTASSIUM SERPL-SCNC: 4 MMOL/L — SIGNIFICANT CHANGE UP (ref 3.5–5.3)
POTASSIUM UR-SCNC: 24 MMOL/L — SIGNIFICANT CHANGE UP
PROT ?TM UR-MCNC: 13 MG/DL — HIGH (ref 0–12)
PROT SERPL-MCNC: 6.2 G/DL — LOW (ref 6.6–8.7)
PROT UR-MCNC: SIGNIFICANT CHANGE UP MG/DL
PROT/CREAT UR-RTO: 0.6 RATIO — HIGH
RAPID RVP RESULT: SIGNIFICANT CHANGE UP
RBC # BLD: 3.3 M/UL — LOW (ref 3.8–5.2)
RBC # FLD: 18 % — HIGH (ref 10.3–14.5)
RBC CASTS # UR COMP ASSIST: 1 /HPF — SIGNIFICANT CHANGE UP (ref 0–4)
SARS-COV-2 RNA SPEC QL NAA+PROBE: SIGNIFICANT CHANGE UP
SODIUM SERPL-SCNC: 127 MMOL/L — LOW (ref 135–145)
SODIUM UR-SCNC: 51 MMOL/L — SIGNIFICANT CHANGE UP
SP GR SPEC: 1.01 — SIGNIFICANT CHANGE UP (ref 1–1.03)
TROPONIN T, HIGH SENSITIVITY RESULT: 118 NG/L — HIGH (ref 0–51)
UROBILINOGEN FLD QL: 0.2 MG/DL — SIGNIFICANT CHANGE UP (ref 0.2–1)
WBC # BLD: 10.62 K/UL — HIGH (ref 3.8–10.5)
WBC # FLD AUTO: 10.62 K/UL — HIGH (ref 3.8–10.5)
WBC UR QL: 1 /HPF — SIGNIFICANT CHANGE UP (ref 0–5)

## 2025-04-11 PROCEDURE — 93010 ELECTROCARDIOGRAM REPORT: CPT

## 2025-04-11 PROCEDURE — 99233 SBSQ HOSP IP/OBS HIGH 50: CPT

## 2025-04-11 PROCEDURE — 99232 SBSQ HOSP IP/OBS MODERATE 35: CPT

## 2025-04-11 RX ORDER — ISOSORBIDE MONONITRATE 60 MG/1
30 TABLET, EXTENDED RELEASE ORAL DAILY
Refills: 0 | Status: DISCONTINUED | OUTPATIENT
Start: 2025-04-12 | End: 2025-04-16

## 2025-04-11 RX ORDER — OXYCODONE HYDROCHLORIDE 30 MG/1
5 TABLET ORAL ONCE
Refills: 0 | Status: DISCONTINUED | OUTPATIENT
Start: 2025-04-11 | End: 2025-04-11

## 2025-04-11 RX ORDER — PIPERACILLIN-TAZO-DEXTROSE,ISO 2.25G/50ML
3.38 IV SOLUTION, PIGGYBACK PREMIX FROZEN(ML) INTRAVENOUS ONCE
Refills: 0 | Status: COMPLETED | OUTPATIENT
Start: 2025-04-11 | End: 2025-04-11

## 2025-04-11 RX ORDER — PIPERACILLIN-TAZO-DEXTROSE,ISO 2.25G/50ML
3.38 IV SOLUTION, PIGGYBACK PREMIX FROZEN(ML) INTRAVENOUS EVERY 8 HOURS
Refills: 0 | Status: COMPLETED | OUTPATIENT
Start: 2025-04-11 | End: 2025-04-15

## 2025-04-11 RX ORDER — METOPROLOL SUCCINATE 50 MG/1
50 TABLET, EXTENDED RELEASE ORAL DAILY
Refills: 0 | Status: DISCONTINUED | OUTPATIENT
Start: 2025-04-12 | End: 2025-04-23

## 2025-04-11 RX ORDER — MAGNESIUM, ALUMINUM HYDROXIDE 200-200 MG
30 TABLET,CHEWABLE ORAL EVERY 4 HOURS
Refills: 0 | Status: DISCONTINUED | OUTPATIENT
Start: 2025-04-11 | End: 2025-05-09

## 2025-04-11 RX ORDER — SACUBITRIL AND VALSARTAN 6; 6 MG/1; MG/1
1 PELLET ORAL
Refills: 0 | Status: DISCONTINUED | OUTPATIENT
Start: 2025-04-11 | End: 2025-04-17

## 2025-04-11 RX ADMIN — INSULIN LISPRO 4: 100 INJECTION, SOLUTION INTRAVENOUS; SUBCUTANEOUS at 17:55

## 2025-04-11 RX ADMIN — OXYCODONE HYDROCHLORIDE 5 MILLIGRAM(S): 30 TABLET ORAL at 04:19

## 2025-04-11 RX ADMIN — OXYCODONE HYDROCHLORIDE 5 MILLIGRAM(S): 30 TABLET ORAL at 05:19

## 2025-04-11 RX ADMIN — Medication 200 GRAM(S): at 04:20

## 2025-04-11 RX ADMIN — SACUBITRIL AND VALSARTAN 1 TABLET(S): 6; 6 PELLET ORAL at 12:35

## 2025-04-11 RX ADMIN — Medication 325 MILLIGRAM(S): at 22:39

## 2025-04-11 RX ADMIN — Medication 50 MILLIGRAM(S): at 21:21

## 2025-04-11 RX ADMIN — GABAPENTIN 400 MILLIGRAM(S): 400 CAPSULE ORAL at 12:03

## 2025-04-11 RX ADMIN — INSULIN LISPRO 2: 100 INJECTION, SOLUTION INTRAVENOUS; SUBCUTANEOUS at 12:10

## 2025-04-11 RX ADMIN — Medication 50 MILLIGRAM(S): at 05:23

## 2025-04-11 RX ADMIN — BUMETANIDE 2 MILLIGRAM(S): 1 TABLET ORAL at 12:04

## 2025-04-11 RX ADMIN — Medication 25 GRAM(S): at 14:34

## 2025-04-11 RX ADMIN — BUMETANIDE 2 MILLIGRAM(S): 1 TABLET ORAL at 17:11

## 2025-04-11 RX ADMIN — Medication 50 MILLIGRAM(S): at 14:33

## 2025-04-11 RX ADMIN — BUMETANIDE 2 MILLIGRAM(S): 1 TABLET ORAL at 05:23

## 2025-04-11 RX ADMIN — Medication 25 GRAM(S): at 07:54

## 2025-04-11 RX ADMIN — Medication 25 GRAM(S): at 21:21

## 2025-04-11 RX ADMIN — METOPROLOL SUCCINATE 25 MILLIGRAM(S): 50 TABLET, EXTENDED RELEASE ORAL at 05:23

## 2025-04-11 RX ADMIN — ATORVASTATIN CALCIUM 80 MILLIGRAM(S): 80 TABLET, FILM COATED ORAL at 21:21

## 2025-04-11 RX ADMIN — SACUBITRIL AND VALSARTAN 1 TABLET(S): 6; 6 PELLET ORAL at 21:21

## 2025-04-11 RX ADMIN — TICAGRELOR 60 MILLIGRAM(S): 90 TABLET ORAL at 05:22

## 2025-04-11 RX ADMIN — ISOSORBIDE MONONITRATE 30 MILLIGRAM(S): 60 TABLET, EXTENDED RELEASE ORAL at 12:03

## 2025-04-11 RX ADMIN — INSULIN GLARGINE-YFGN 25 UNIT(S): 100 INJECTION, SOLUTION SUBCUTANEOUS at 21:46

## 2025-04-11 RX ADMIN — TICAGRELOR 60 MILLIGRAM(S): 90 TABLET ORAL at 17:11

## 2025-04-11 RX ADMIN — Medication 325 MILLIGRAM(S): at 21:39

## 2025-04-11 RX ADMIN — Medication 81 MILLIGRAM(S): at 12:03

## 2025-04-11 NOTE — PROGRESS NOTE ADULT - SUBJECTIVE AND OBJECTIVE BOX
[facility] Division of Hospital Medicine    INTERVAL HISTORY:  Overnight, no acute events.     Patient seen and examined at bedside this morning. Reports feeling a little better. Patient denies chest pain, SOB, abd pain, N/V, fever, chills, dysuria or any other complaints.     MEDICATIONS  (STANDING):  aspirin  chewable 81 milliGRAM(s) Oral daily  atorvastatin 80 milliGRAM(s) Oral at bedtime  buMETAnide Injectable 2 milliGRAM(s) IV Push <User Schedule>  dextrose 5%. 1000 milliLiter(s) (50 mL/Hr) IV Continuous <Continuous>  dextrose 5%. 1000 milliLiter(s) (100 mL/Hr) IV Continuous <Continuous>  dextrose 50% Injectable 25 Gram(s) IV Push once  dextrose 50% Injectable 12.5 Gram(s) IV Push once  dextrose 50% Injectable 25 Gram(s) IV Push once  gabapentin 400 milliGRAM(s) Oral daily  glucagon  Injectable 1 milliGRAM(s) IntraMuscular once  hydrALAZINE 50 milliGRAM(s) Oral three times a day  insulin glargine Injectable (LANTUS) 25 Unit(s) SubCutaneous at bedtime  insulin lispro (ADMELOG) corrective regimen sliding scale   SubCutaneous three times a day before meals  piperacillin/tazobactam IVPB.. 3.375 Gram(s) IV Intermittent every 8 hours  sacubitril 24 mG/valsartan 26 mG 1 Tablet(s) Oral two times a day  ticagrelor 60 milliGRAM(s) Oral every 12 hours    MEDICATIONS  (PRN):  acetaminophen     Tablet .. 325 milliGRAM(s) Oral every 4 hours PRN Temp greater or equal to 38C (100.4F), Mild Pain (1 - 3)  aluminum hydroxide/magnesium hydroxide/simethicone Suspension 30 milliLiter(s) Oral every 4 hours PRN Dyspepsia  dextrose Oral Gel 15 Gram(s) Oral once PRN Blood Glucose LESS THAN 70 milliGRAM(s)/deciliter        I&O's Summary    10 Apr 2025 07:01  -  11 Apr 2025 07:00  --------------------------------------------------------  IN: 240 mL / OUT: 1330 mL / NET: -1090 mL    11 Apr 2025 07:01  -  11 Apr 2025 16:15  --------------------------------------------------------  IN: 0 mL / OUT: 850 mL / NET: -850 mL        PHYSICAL EXAM:  Vital Signs Last 24 Hrs  T(C): 36.8 (11 Apr 2025 12:16), Max: 38 (10 Apr 2025 20:05)  T(F): 98.2 (11 Apr 2025 12:16), Max: 100.4 (10 Apr 2025 20:05)  HR: 74 (11 Apr 2025 13:40) (62 - 77)  BP: 153/67 (11 Apr 2025 13:40) (124/63 - 154/66)  BP(mean): --  RR: 17 (11 Apr 2025 13:40) (16 - 19)  SpO2: 97% (11 Apr 2025 13:40) (94% - 100%)    Parameters below as of 11 Apr 2025 13:40  Patient On (Oxygen Delivery Method): room air    CONSTITUTIONAL: No apparent distress  HEENT: Normocephalic, Atraumatic  RESPIRATORY:  lungs are clear to auscultation bilaterally, No crackles, rhonchi, wheezes  CARDIOVASCULAR: Regular rate and rhythm, No lower extremity edema  ABDOMEN: Soft, non-distended, nontender to palpation, +BS  MUSCLOSKELETAL: Normal gait  PSYCH: thoughts linear, affect appropriate  NEUROLOGY: Alert, Oriented x3    LABS:                        9.5    10.62 )-----------( 561      ( 11 Apr 2025 00:46 )             29.1     04-11    127[L]  |  90[L]  |  30.1[H]  ----------------------------<  163[H]  4.0   |  22.0  |  1.66[H]    Ca    6.9[L]      11 Apr 2025 00:46    TPro  6.2[L]  /  Alb  2.9[L]  /  TBili  0.6  /  DBili  x   /  AST  16  /  ALT  21  /  AlkPhos  135[H]  04-11          Urinalysis Basic - ( 11 Apr 2025 00:46 )    Color: x / Appearance: x / SG: x / pH: x  Gluc: 163 mg/dL / Ketone: x  / Bili: x / Urobili: x   Blood: x / Protein: x / Nitrite: x   Leuk Esterase: x / RBC: x / WBC x   Sq Epi: x / Non Sq Epi: x / Bacteria: x        CAPILLARY BLOOD GLUCOSE      POCT Blood Glucose.: 168 mg/dL (11 Apr 2025 12:03)  POCT Blood Glucose.: 149 mg/dL (11 Apr 2025 08:08)  POCT Blood Glucose.: 179 mg/dL (10 Apr 2025 21:53)        RADIOLOGY & ADDITIONAL TESTS:  Results Reviewed

## 2025-04-11 NOTE — CHART NOTE - NSCHARTNOTEFT_GEN_A_CORE
Notified by RN, patient with complaints of  chest pressure, nonradiating.     Plan: MD notified.   Ekg  Stat Troponin Notified by RN, patient with complaints of  chest pressure, nonradiating.       Called by RN that Pt states  she feels chest pressure . Pt seen and examined at bedside. Pt denies chest pain, palpitations, shortness of breath, dizziness, headaches, abdominal pain, nausea or any other complaints.     VITALS:  T(C): 36.8 (04-11-25 @ 12:16), Max: 38 (04-10-25 @ 20:05)  HR: 74 (04-11-25 @ 13:40) (60 - 77)  BP: 153/67 (04-11-25 @ 13:40) (124/63 - 154/66)  RR: 17 (04-11-25 @ 13:40) (16 - 19)  SpO2: 97% (04-11-25 @ 13:40) (94% - 100%)    PHYSICAL EXAM:  GENERAL: Pt lying in bed comfortably  HEAD:  Atraumatic   EYES: EOMI, PERRL, conjunctiva and sclera clear  ENT: Moist mucous membranes  CHEST/LUNG: Clear to auscultation bilaterally; No rales, rhonchi or wheezing. Unlabored respirations  HEART: S1, S2   ABDOMEN: Bowel sounds present; Soft, Nontender, Nondistended. No guarding or rigidity    EXTREMITIES:  2+ Peripheral Pulses, brisk capillary refill <2 seconds. No clubbing, cyanosis, or edema   NERVOUS SYSTEM:  Alert & Oriented X3, speech clear. Answers questions appropriately.         A/P: Pt is a 74yF admitted 04-09-25 for Acute on chronic systolic congestive heart failure now with chest pressure.   -  plan d/w MD, in agreement   Ekg  Stat Troponin Called by RN that Pt states  she feels chest pressure . Pt seen and examined at bedside. Pt denies chest pain, palpitations, shortness of breath, dizziness, headaches, abdominal pain, nausea or any other complaints. States pressure is not radiating,      VITALS:  T(C): 36.8 (04-11-25 @ 12:16), Max: 38 (04-10-25 @ 20:05)  HR: 74 (04-11-25 @ 13:40) (60 - 77)  BP: 153/67 (04-11-25 @ 13:40) (124/63 - 154/66)  RR: 17 (04-11-25 @ 13:40) (16 - 19)  SpO2: 97% (04-11-25 @ 13:40) (94% - 100%)    PHYSICAL EXAM:  GENERAL: Pt lying in bed comfortably  HEAD:  Atraumatic   EYES: EOMI, PERRL, conjunctiva and sclera clear  ENT: Moist mucous membranes  CHEST/LUNG: Clear to auscultation bilaterally; No rales, rhonchi or wheezing. Unlabored respirations  HEART: S1, S2   ABDOMEN: Bowel sounds present; Soft, Nontender, Nondistended. No guarding or rigidity    EXTREMITIES:  2+ Peripheral Pulses, brisk capillary refill <2 seconds. No clubbing, cyanosis, or edema   NERVOUS SYSTEM:  Alert & Oriented X3, speech clear. Answers questions appropriately.         A/P: Pt is a 74yF admitted 04-09-25 for Acute on chronic systolic congestive heart failure now with chest pressure.   -  plan d/w MD, in agreement   Ekg  Stat Troponin Called by RN that Pt states  she feels chest pressure . Pt seen and examined at bedside. Pt denies chest pain, palpitations, shortness of breath, dizziness, headaches, abdominal pain, nausea or any other complaints. States pressure is not radiating,      VITALS:  T(C): 36.8 (04-11-25 @ 12:16), Max: 38 (04-10-25 @ 20:05)  HR: 74 (04-11-25 @ 13:40) (60 - 77)  BP: 153/67 (04-11-25 @ 13:40) (124/63 - 154/66)  RR: 17 (04-11-25 @ 13:40) (16 - 19)  SpO2: 97% (04-11-25 @ 13:40) (94% - 100%)    PHYSICAL EXAM:  GENERAL: Pt lying in bed comfortably  HEAD:  Atraumatic   EYES: EOMI, PERRL, conjunctiva and sclera clear  ENT: Moist mucous membranes  CHEST/LUNG: Clear to auscultation bilaterally; No rales, rhonchi or wheezing. Unlabored respirations  HEART: S1, S2   ABDOMEN: Bowel sounds present; Soft, Nontender, Nondistended. No guarding or rigidity    EXTREMITIES:  2+ Peripheral Pulses, brisk capillary refill <2 seconds. No clubbing, cyanosis, or edema   NERVOUS SYSTEM:  Alert & Oriented X3, speech clear. Answers questions appropriately.         A/P: Pt is a 74yF admitted 04-09-25 for Acute on chronic systolic congestive heart failure now with chest pressure.   -  plan d/w MD, in agreement   Ekg  Stat Troponin  Stat Maalox

## 2025-04-11 NOTE — PROGRESS NOTE ADULT - NS ATTEND AMEND GEN_ALL_CORE FT
Ms. Ko is a 75 y/o Turkmen speaking female with PMH of ICM/HFrEF (LVEF 35-40%), CAD s/p PCI, Mobitz II s/p PPM (MIRNA Dawn), HTN, HLD, PAD with chronic right LE wound, DM2, and CKD3, who presented to the ED on 3/19 with c/o CP, found to have NSTEMI. She underwent PCI to her RCA for ISR on 3/21/25. Over the next couple of days she developed fever, leukocytosis and worsening renal function. Her blood cultures are positive for ESBL E.coli and CT abdomen is concerning for left pyelonephritis. She was treated with antibiotics and was discharged on oral torsemide and she was instructed to check her cardiomems reading for further diuretic titration. She presented to the HF clinic this am and was found to be very volume overloaded on exam with pro BNP > 64651, weight gain of 20 pounds and cardiomems PAD 32-33. She was sent to the ED for further evaluation.     Cardiomems was interrogated and shows PAD 31-33.       Continue bumex 2 mg IV TID. If she is not responding to intermittent bumex, low threshold to start bumex drip.   Continue Toprol 50 mg daily.   Continue Imdur 30 mg daily and hydralazine 50 mg TID.   As renal function has remained stable, we will start Entresto 24-26 mg BID.   We will uptitrate GDMT based on her renal function.   She was not discharged on ARNI, MRA during her last admission because of acute on chronic kidney disease.  Not on SGLT2i given recent pyelonephritis.  Continue DAPT and statin for recent PCI.   We will repeat an echocardiogram when she is more euvolemic.     Further management of other co-morbidities per primary team.

## 2025-04-11 NOTE — ADVANCED PRACTICE NURSE CONSULT - ASSESSMENT
HELDER SCORE  Last helder score is : ---  19+: pt is NOT at risk for developing pressure injuries  15-18: pt is AT RISK for developing pressure injuries   13-14: pt is AT MODERATE RISK for developing pressure injuries   10-12: pt is AT HIGH RISK for developing pressure injuries   6-9: pt is AT VERY HIGH RISK for developing pressure injuries     PHYSICAL EXAM  Is pt AOx?:  Bed bound?:   Incontinent?:  Mattress/ bed active?:   Can pt assist with turn or dependent?:  Active pressure injury prevention measures? :     SKIN CHECK  -overall well appearing female  -RLE knee edema, tenderness on palpation, reproducible pain on flexion and extension. No visisble wound bed. LLE no edema, FROM. No visible wound bed   -R foot dorsum, scabbed lesion measuring 1x2 cm and non draining. no periwound erythema, bogginess, nor palpable collection.   -no evident PI over buttocks or feet at this time.

## 2025-04-11 NOTE — PROGRESS NOTE ADULT - PROBLEM SELECTOR PLAN 1
- ICM, LVEF 25-30% on 3/21 (previously 35-40%). Can repeat TTE once more optimized.   - Clinically appears at least mildly hypervolemic on exam. Hypertensive.  - CardioMEMS PAD elevated on admission 31-33  - Serum proBNP >68K  - Hyponatremia - improving  - Diuretics: Bumex 2 mg IVP TID  - GDMT: Imdur 30 mg daily, Hydralazine 50 mg TID, Toprol-XL 50 mg nightly. MRA held previously 2/2 hyperkalemia. No Farxiga 2/2 h/o pyelonephritis. Will attempt to optimize GDMT w/ ARNi in coming days.  - Device: s/p Micra PPM with chronic   - Low Na, 1.5L FR diet recommended  - Please document strict I&Os and daily standing weight.  - Will continue to monitor her CardioMEMS readings. - ICM, LVEF 25-30% on 3/21 (previously 35-40%). Can repeat TTE once more optimized.   - Clinically appears hypervolemic on exam. Hypertensive.  - Serum proBNP >68K  - Hyponatremia likely 2/2 hypervolemia.  - Diuretics: Continue Bumex 2 mg IVP TID  - GDMT: Will start Entresto 24-26 mg BID. Continue Imdur 30 mg daily, Hydralazine 50 mg TID, and Toprol-XL 50 mg daily. Can continue to uptitrate Entresto and wean off hydralazine as long as renal function is stable. Can also consider adding MRA this weekend. No Farxiga 2/2 h/o pyelonephritis.   - Device: s/p Micra PPM with chronic . Can consider device upgrade to CRT in the future as an outpatient.  - Low Na, 1.5L FR diet recommended  - Please document strict I&Os and daily standing weight.  - Will continue to monitor her CardioMEMS readings.

## 2025-04-11 NOTE — ADVANCED PRACTICE NURSE CONSULT - RECOMMEDATIONS
R knee effusion per recent xray, no acute fx. recommend ortho consult for furhter management   clean/dry/stable wound over dorusm of R foot  chronic hyperpigmentation over RLE from prior admission.     1. old bruise over the RLE from prior admission, O2 tank fell on extremity   2. R foot dorsum, scab-POA    WOUNDS:  =======  R foot  paint with cavilon, and elevate heels bl off bed when applicable   use rolled blanket to help support knee and reduce additional pain with extension     buttocks (preventative)  -cleanse the wound and periwound area appropriately per policy   -apply critic aid moisture clear barrier ointment in nickel thick layer to affected area, until barely visible  -DO NOT cover with foam or external dressing  -Apply Q 12 hr and again with each episode of incontinence/cleansing.       GENERAL GUIDELINES, AND RECOMMENDATIONS  ==============================  -Carefully check that no tubes are entangled with the bed linens or have contact to patient’s skin. Keep bed sheets smooth and wrinkle free to prevent injury to the skin   - Assure to position pt feet at 20 degrees, then HOB at a 30 degree angle  to limit sliding/friction/shearing, especially in bed bound populations.       Turn and position the patient Q2 hours. Use wedges when turning and positioning to the left/right, notably if patient can only assist minimally.  Keep the pt’s heels protected and elevated off the surface of the bed. Place what the patient can tolerate: pillows, moldable pillows      This was a 15  minute visit, with greater than 50% counseling. My findings and suggestions that may benefit the pt were disscussed with the family/caregiver if present at bedside and with pt permission,  shift RN at bedside, in addition to overseeing team/provider via chat and or telephone.     Attending on file for patient notified was : covering provider, [employer]    Wound Care will continue to follow the patient? : no    Continue to monitor skin integrity as per policy,  and call or re-consult Wound Care with any acute changes or lack of progression of current recommendations. Wound care rceommendations are generally for shift RN dressing changes, unless otherwise specified. Wound Care IS NOT continuing to follow the patient unless otherwise specified as noted above.     Titorosa CLARKN, RN, CWCN  Wound Ostomy Nurse Educator   [facility]  Please call/message over Teams and/or Email for clarification/contact.  (E): [email or web address]

## 2025-04-11 NOTE — ADVANCED PRACTICE NURSE CONSULT - REASON FOR CONSULT
Wound Care was consulted for evaluation of the following:  -R foot wound       Pt seen at bedside today:  -used language line Cambodian interpretor # [id number], pt reports having fallen when at doctors office most recent, but also endorsed having a oxygen tank fall on her RLE during prior admission. Now with increased R knee pain and swelling, worse on movement. has not been able to walk per pt and family for ~ 2 weeks d/t pain,.

## 2025-04-11 NOTE — PROGRESS NOTE ADULT - ASSESSMENT
74yoF with a history of coronary artery disease, heart failure, chronic kidney disease, hypertension, diabetes, and anemia who was recently hospitalized for NSTEMI with PCI and bacteremia who presented with dyspnea on exertion and weight gain.    #Acute on chronic systolic heart failure  - Recent echocardiogram noted ejection fraction of 31% with multiple segmental abnormalities  - c/w IV bumex tid   - Strict in and out  - Daily weights  - On metoprolol  - Heart Failure on board    #Coronary artery disease  - Continue on aspirin, ticagrelor, and atorvastatin  - Recent cardiac catheterization with PCI  - initial trop 140  - pt with recurrent chest pain, high up in chest, EKG trop ordered  - given maalox as well     #Chronic kidney disease  - Renal function improved from prior values  - Continue to monitor while on diuresis    #Hyponatremia  - Hypervolemic on presentation  - Bumetanide for diuresis  - urine studies ordered    #Diabetes  - Insulin coverage  - Close monitoring of blood glucose levels  - Gabapentin for neuropathy    #Right knee pain  - knee xray showing effusion, possible in setting of fall  - continue to monitor for pain  - if worsening pain, swelling on exam will get repeat imaging and ortho consult     Discussed with the patient and her family at the bedside      Dispo: Pending HF optimization

## 2025-04-11 NOTE — PROGRESS NOTE ADULT - SUBJECTIVE AND OBJECTIVE BOX
ADVANCED HEART FAILURE - PROGRESS NOTE  [address] [place] [address]  Office Phone: [phone]/Fax: [phone]  Service/On Call Phone [phone]  _______________________________________________________________________________________________________    Subjective:      Medications:  acetaminophen     Tablet .. 325 milliGRAM(s) Oral every 4 hours PRN  aspirin  chewable 81 milliGRAM(s) Oral daily  atorvastatin 80 milliGRAM(s) Oral at bedtime  buMETAnide Injectable 2 milliGRAM(s) IV Push <User Schedule>  dextrose 5%. 1000 milliLiter(s) IV Continuous <Continuous>  dextrose 5%. 1000 milliLiter(s) IV Continuous <Continuous>  dextrose 50% Injectable 25 Gram(s) IV Push once  dextrose 50% Injectable 12.5 Gram(s) IV Push once  dextrose 50% Injectable 25 Gram(s) IV Push once  dextrose Oral Gel 15 Gram(s) Oral once PRN  gabapentin 400 milliGRAM(s) Oral daily  glucagon  Injectable 1 milliGRAM(s) IntraMuscular once  hydrALAZINE 50 milliGRAM(s) Oral three times a day  insulin glargine Injectable (LANTUS) 25 Unit(s) SubCutaneous at bedtime  insulin lispro (ADMELOG) corrective regimen sliding scale   SubCutaneous three times a day before meals  isosorbide   mononitrate ER Tablet (IMDUR) 30 milliGRAM(s) Oral daily  metoprolol tartrate 25 milliGRAM(s) Oral two times a day  piperacillin/tazobactam IVPB.. 3.375 Gram(s) IV Intermittent every 8 hours  ticagrelor 60 milliGRAM(s) Oral every 12 hours      ICU Vital Signs Last 24 Hrs  T(C): 36.5, Max: 38 (04-10 @ 20:05)  HR: 63 (60 - 77)  BP: 146/69 (124/63 - 154/82)  BP(mean): --  ABP: --  ABP(mean): --  RR: 17 (16 - 18)  SpO2: 100% (94% - 100%)        I&O's Summary Last 24 Hrs    IN: 240 mL / OUT: 1330 mL / NET: -1090 mL      Tele:    Physical Exam:    General: No distress. Comfortable.  Neck: JVP not elevated.  Respiratory: Clear to auscultation bilaterally  CV: RRR. Normal S1 and S2. No murmurs, rub, or gallops. Radial pulses normal.  Abdomen: Soft, non-distended, non-tender  Extremities: Warm, no edema  Neurology: Non-focal, alert and oriented times three.   Psych: Affect normal    Labs:    ( 04-11-25 @ 00:46 )               9.5    10.62 )--------( 561                  29.1     ( 04-11-25 @ 00:46 )     127  |  90  |  30.1  ---------------------<  163  4.0  |  22.0  |  1.66    Ca 6.9  Phos x   Mg x     ( 04-11-25 @ 00:46 )  TPro  6.2  /  Alb  2.9  /  TBili  0.6  /  DBili  x   /  AST  16  /  ALT  21  /  AlkPhos  135  ( 04-09-25 @ 10:04 )  TPro  7.1  /  Alb  3.3  /  TBili  0.7  /  DBili  x   /  AST  34  /  ALT  34  /  AlkPhos  148    ( 04-09-25 @ 10:04 )  TropHS 140   / CK x     / CKMB x      ( 03-20-25 @ 00:20 )  TropHS 854   / CK x     / CKMB x       ADVANCED HEART FAILURE - PROGRESS NOTE  [address] [place] [address]  Office Phone: [phone]/Fax: [phone]  Service/On Call Phone [phone]  _______________________________________________________________________________________________________    Subjective:  - Patient endorses dizziness and N/V last night  - Denies SOB at rest, orthopnea, PND, CP, palpitations.    Medications:  acetaminophen     Tablet .. 325 milliGRAM(s) Oral every 4 hours PRN  aspirin  chewable 81 milliGRAM(s) Oral daily  atorvastatin 80 milliGRAM(s) Oral at bedtime  buMETAnide Injectable 2 milliGRAM(s) IV Push <User Schedule>  dextrose 5%. 1000 milliLiter(s) IV Continuous <Continuous>  dextrose 5%. 1000 milliLiter(s) IV Continuous <Continuous>  dextrose 50% Injectable 25 Gram(s) IV Push once  dextrose 50% Injectable 12.5 Gram(s) IV Push once  dextrose 50% Injectable 25 Gram(s) IV Push once  dextrose Oral Gel 15 Gram(s) Oral once PRN  gabapentin 400 milliGRAM(s) Oral daily  glucagon  Injectable 1 milliGRAM(s) IntraMuscular once  hydrALAZINE 50 milliGRAM(s) Oral three times a day  insulin glargine Injectable (LANTUS) 25 Unit(s) SubCutaneous at bedtime  insulin lispro (ADMELOG) corrective regimen sliding scale   SubCutaneous three times a day before meals  isosorbide   mononitrate ER Tablet (IMDUR) 30 milliGRAM(s) Oral daily  metoprolol tartrate 25 milliGRAM(s) Oral two times a day  piperacillin/tazobactam IVPB.. 3.375 Gram(s) IV Intermittent every 8 hours  ticagrelor 60 milliGRAM(s) Oral every 12 hours      ICU Vital Signs Last 24 Hrs  T(C): 36.5, Max: 38 (04-10 @ 20:05)  HR: 63 (60 - 77)  BP: 146/69 (124/63 - 154/82)  BP(mean): --  ABP: --  ABP(mean): --  RR: 17 (16 - 18)  SpO2: 100% (94% - 100%)        I&O's Summary Last 24 Hrs    IN: 240 mL / OUT: 1330 mL / NET: -1090 mL      Tele:  60s    Physical Exam:    General: No distress. Comfortable.  Neck: JVP elevated.  Respiratory: Clear to auscultation bilaterally  CV: RRR. Normal S1 and S2. No murmurs, rub, or gallops. Radial pulses normal.  Abdomen: Soft, non-distended, non-tender  Extremities: Warm, 1+ BLE edema  Neurology: Non-focal, alert and oriented times three.   Psych: Affect normal    Labs:    ( 04-11-25 @ 00:46 )               9.5    10.62 )--------( 561                  29.1     ( 04-11-25 @ 00:46 )     127  |  90  |  30.1  ---------------------<  163  4.0  |  22.0  |  1.66    Ca 6.9  Phos x   Mg x     ( 04-11-25 @ 00:46 )  TPro  6.2  /  Alb  2.9  /  TBili  0.6  /  DBili  x   /  AST  16  /  ALT  21  /  AlkPhos  135  ( 04-09-25 @ 10:04 )  TPro  7.1  /  Alb  3.3  /  TBili  0.7  /  DBili  x   /  AST  34  /  ALT  34  /  AlkPhos  148    ( 04-09-25 @ 10:04 )  TropHS 140   / CK x     / CKMB x      ( 03-20-25 @ 00:20 )  TropHS 854   / CK x     / CKMB x

## 2025-04-11 NOTE — PROGRESS NOTE ADULT - ASSESSMENT
Gen Cards: Dr. Allen  HF: Dr. Polanco    75 y/o female with PMH of ICM/HFrEF (LVEF 35-40%), CAD s/p PCI, Mobitz II s/p PPM (MIRNA Dawn), HTN, HLD, PAD with chronic right LE wound, DM2, and CKD3, who was BIBA from CHF clinic due to hypervolemia, weight gain of ~20 lbs in the past 9 days despite escalation of PO diuretics.      She was recently admitted on 3/19/25 with c/o CP and found to have NSTEMI. She underwent LHC on 3/21 which showed mid-distal LAD severe stenosis, OM1 and OM2 severe stenosis, mid ISR 50%, distal ISR 99%, s/p PCI with 1 ZEINAB to RCA.  On 3/22 she had a fever with leukocytosis and her blood cultures were positive for E. Coli/ESBL. Her CT A/P showed left pyelonephritis. She also had an BRADY for which her Entresto was held. She was discharged home 3/31 and her CardioMEMS had been rising. She was advised to increase Torsemide 40 mg daily to twice daily on 4/4. Despite this, she has continued to gain weight and reports poor UOP response.     Admission labwork: Na 125, K 4.6, BUN 37.7, Cr 1.94, eGFR 27, Mg 2.3, proBNP 44832!  CXR: increased interstitial markings bilaterally, cardiomegaly.     Prior Cardiac Testing:  TTE 3/21/25: LVEF 25-30%, grade II DD, normal RV size/function, severe LAE, moderate MR, mild TR, PASP 58 mmHg ( RAP 8 mmHg), no evidence of LV thrombus, multiple segmental abnormalities/regional WMA.    CardioMEMS PAD (goal 14?):  4/9: 32  3/27: 26  3/24: 23  3/21: 22 Gen Cards: Dr. Allen  HF: Dr. Polanco    73 y/o female with PMH of ICM/HFrEF (LVEF 35-40%), CAD s/p PCI, Mobitz II s/p PPM (MIRNA Dawn), HTN, HLD, PAD with chronic right LE wound, DM2, and CKD3, who was BIBA from CHF clinic due to hypervolemia, weight gain of ~20 lbs in the past 9 days despite escalation of PO diuretics.      She was recently admitted on 3/19/25 with c/o CP and found to have NSTEMI. She underwent LHC on 3/21 which showed mid-distal LAD severe stenosis, OM1 and OM2 severe stenosis, mid ISR 50%, distal ISR 99%, s/p PCI with 1 ZEINAB to RCA.  On 3/22 she had a fever with leukocytosis and her blood cultures were positive for E. Coli/ESBL. Her CT A/P showed left pyelonephritis. She also had an BRADY for which her Entresto was held. She was discharged home 3/31 and her CardioMEMS had been rising. She was advised to increase Torsemide 40 mg daily to twice daily on 4/4. Despite this, she has continued to gain weight and reports poor UOP response.     Admission labwork: Na 125, K 4.6, BUN 37.7, Cr 1.94, eGFR 27, Mg 2.3, proBNP 46482!  CXR: increased interstitial markings bilaterally, cardiomegaly.   On 4/10 she developed a fever, tmax 100.4F. RVP negative, empirically started on IV Zosyn    Prior Cardiac Testing:  TTE 3/21/25: LVEF 25-30%, grade II DD, normal RV size/function, severe LAE, moderate MR, mild TR, PASP 58 mmHg ( RAP 8 mmHg), no evidence of LV thrombus, multiple segmental abnormalities/regional WMA.    CardioMEMS PAD (goal 14?):  4/9: 32  3/27: 26  3/24: 23  3/21: 22 Gen Cards: Dr. Allen  HF: Dr. Polanco    73 y/o female with PMH of ICM/HFrEF (LVEF 35-40%), CAD s/p PCI, Mobitz II s/p PPM (MIRNA Dawn), HTN, HLD, PAD with chronic right LE wound, DM2, and CKD3, who was BIBA from CHF clinic due to hypervolemia, weight gain of ~20 lbs in the past 9 days despite escalation of PO diuretics.      She was recently admitted on 3/19/25 with c/o CP and found to have NSTEMI. She underwent LHC on 3/21 which showed mid-distal LAD severe stenosis, OM1 and OM2 severe stenosis, mid ISR 50%, distal ISR 99%, s/p PCI with 1 ZEINAB to RCA.  On 3/22 she had a fever with leukocytosis and her blood cultures were positive for E. Coli/ESBL. Her CT A/P showed left pyelonephritis. She also had an BRADY for which her Entresto was held. She was discharged home 3/31 and her CardioMEMS had been rising. She was advised to increase Torsemide 40 mg daily to twice daily on 4/4. Despite this, she has continued to gain weight and reports poor UOP response.     Admission labwork: Na 125, K 4.6, BUN 37.7, Cr 1.94, eGFR 27, Mg 2.3, proBNP 55805!  CXR: increased interstitial markings bilaterally, cardiomegaly.   On 4/10 she developed a fever, tmax 100.4F. RVP negative, empirically started on IV Zosyn    Prior Cardiac Testing:  TTE 3/21/25: LVEF 25-30%, grade II DD, normal RV size/function, severe LAE, moderate MR, mild TR, PASP 58 mmHg ( RAP 8 mmHg), no evidence of LV thrombus, multiple segmental abnormalities/regional WMA.    CardioMEMS PAD (goal 14?):  4/11: 32  4/9: 32  3/27: 26  3/24: 23  3/21: 22

## 2025-04-12 LAB
ANION GAP SERPL CALC-SCNC: 16 MMOL/L — SIGNIFICANT CHANGE UP (ref 5–17)
BUN SERPL-MCNC: 28.1 MG/DL — HIGH (ref 8–20)
CALCIUM SERPL-MCNC: 7.2 MG/DL — LOW (ref 8.4–10.5)
CHLORIDE SERPL-SCNC: 90 MMOL/L — LOW (ref 96–108)
CO2 SERPL-SCNC: 21 MMOL/L — LOW (ref 22–29)
CREAT SERPL-MCNC: 1.88 MG/DL — HIGH (ref 0.5–1.3)
EGFR: 28 ML/MIN/1.73M2 — LOW
EGFR: 28 ML/MIN/1.73M2 — LOW
GLUCOSE BLDC GLUCOMTR-MCNC: 124 MG/DL — HIGH (ref 70–99)
GLUCOSE BLDC GLUCOMTR-MCNC: 143 MG/DL — HIGH (ref 70–99)
GLUCOSE BLDC GLUCOMTR-MCNC: 191 MG/DL — HIGH (ref 70–99)
GLUCOSE BLDC GLUCOMTR-MCNC: 192 MG/DL — HIGH (ref 70–99)
GLUCOSE SERPL-MCNC: 124 MG/DL — HIGH (ref 70–99)
HCT VFR BLD CALC: 32.4 % — LOW (ref 34.5–45)
HGB BLD-MCNC: 10.5 G/DL — LOW (ref 11.5–15.5)
MAGNESIUM SERPL-MCNC: 2.2 MG/DL — SIGNIFICANT CHANGE UP (ref 1.6–2.6)
MCHC RBC-ENTMCNC: 28.8 PG — SIGNIFICANT CHANGE UP (ref 27–34)
MCHC RBC-ENTMCNC: 32.4 G/DL — SIGNIFICANT CHANGE UP (ref 32–36)
MCV RBC AUTO: 89 FL — SIGNIFICANT CHANGE UP (ref 80–100)
NRBC # BLD AUTO: 0 K/UL — SIGNIFICANT CHANGE UP (ref 0–0)
NRBC # FLD: 0 K/UL — SIGNIFICANT CHANGE UP (ref 0–0)
NRBC BLD AUTO-RTO: 0 /100 WBCS — SIGNIFICANT CHANGE UP (ref 0–0)
PLATELET # BLD AUTO: 519 K/UL — HIGH (ref 150–400)
PMV BLD: 9.5 FL — SIGNIFICANT CHANGE UP (ref 7–13)
POTASSIUM SERPL-MCNC: 4.3 MMOL/L — SIGNIFICANT CHANGE UP (ref 3.5–5.3)
POTASSIUM SERPL-SCNC: 4.3 MMOL/L — SIGNIFICANT CHANGE UP (ref 3.5–5.3)
RBC # BLD: 3.64 M/UL — LOW (ref 3.8–5.2)
RBC # FLD: 18.3 % — HIGH (ref 10.3–14.5)
SODIUM SERPL-SCNC: 127 MMOL/L — LOW (ref 135–145)
UUN UR-MCNC: 222 MG/DL — SIGNIFICANT CHANGE UP
WBC # BLD: 11.51 K/UL — HIGH (ref 3.8–10.5)
WBC # FLD AUTO: 11.51 K/UL — HIGH (ref 3.8–10.5)

## 2025-04-12 PROCEDURE — 99233 SBSQ HOSP IP/OBS HIGH 50: CPT

## 2025-04-12 RX ORDER — LIDOCAINE HYDROCHLORIDE 20 MG/ML
1 JELLY TOPICAL DAILY
Refills: 0 | Status: DISCONTINUED | OUTPATIENT
Start: 2025-04-12 | End: 2025-04-13

## 2025-04-12 RX ORDER — ACETAMINOPHEN 500 MG/5ML
650 LIQUID (ML) ORAL EVERY 8 HOURS
Refills: 0 | Status: DISCONTINUED | OUTPATIENT
Start: 2025-04-12 | End: 2025-04-14

## 2025-04-12 RX ADMIN — BUMETANIDE 2 MILLIGRAM(S): 1 TABLET ORAL at 13:17

## 2025-04-12 RX ADMIN — TICAGRELOR 60 MILLIGRAM(S): 90 TABLET ORAL at 17:33

## 2025-04-12 RX ADMIN — Medication 50 MILLIGRAM(S): at 21:55

## 2025-04-12 RX ADMIN — SACUBITRIL AND VALSARTAN 1 TABLET(S): 6; 6 PELLET ORAL at 05:05

## 2025-04-12 RX ADMIN — BUMETANIDE 2 MILLIGRAM(S): 1 TABLET ORAL at 05:36

## 2025-04-12 RX ADMIN — Medication 50 MILLIGRAM(S): at 13:13

## 2025-04-12 RX ADMIN — INSULIN LISPRO 2: 100 INJECTION, SOLUTION INTRAVENOUS; SUBCUTANEOUS at 13:13

## 2025-04-12 RX ADMIN — BUMETANIDE 2 MILLIGRAM(S): 1 TABLET ORAL at 17:33

## 2025-04-12 RX ADMIN — Medication 50 MILLIGRAM(S): at 05:05

## 2025-04-12 RX ADMIN — ATORVASTATIN CALCIUM 80 MILLIGRAM(S): 80 TABLET, FILM COATED ORAL at 21:55

## 2025-04-12 RX ADMIN — Medication 81 MILLIGRAM(S): at 13:14

## 2025-04-12 RX ADMIN — Medication 650 MILLIGRAM(S): at 13:14

## 2025-04-12 RX ADMIN — GABAPENTIN 400 MILLIGRAM(S): 400 CAPSULE ORAL at 13:14

## 2025-04-12 RX ADMIN — Medication 25 GRAM(S): at 21:55

## 2025-04-12 RX ADMIN — Medication 650 MILLIGRAM(S): at 21:55

## 2025-04-12 RX ADMIN — SACUBITRIL AND VALSARTAN 1 TABLET(S): 6; 6 PELLET ORAL at 17:33

## 2025-04-12 RX ADMIN — Medication 25 GRAM(S): at 05:05

## 2025-04-12 RX ADMIN — TICAGRELOR 60 MILLIGRAM(S): 90 TABLET ORAL at 05:06

## 2025-04-12 RX ADMIN — Medication 650 MILLIGRAM(S): at 22:55

## 2025-04-12 RX ADMIN — ISOSORBIDE MONONITRATE 30 MILLIGRAM(S): 60 TABLET, EXTENDED RELEASE ORAL at 13:14

## 2025-04-12 RX ADMIN — METOPROLOL SUCCINATE 50 MILLIGRAM(S): 50 TABLET, EXTENDED RELEASE ORAL at 05:05

## 2025-04-12 RX ADMIN — INSULIN GLARGINE-YFGN 25 UNIT(S): 100 INJECTION, SOLUTION SUBCUTANEOUS at 21:55

## 2025-04-12 RX ADMIN — Medication 25 GRAM(S): at 13:15

## 2025-04-12 NOTE — PROGRESS NOTE ADULT - ASSESSMENT
74yoF with a history of coronary artery disease, heart failure, chronic kidney disease, hypertension, diabetes, and anemia who was recently hospitalized for NSTEMI with PCI and bacteremia who presented with dyspnea on exertion and weight gain.    #Acute on chronic systolic heart failure  - Recent echocardiogram noted ejection fraction of 31% with multiple segmental abnormalities  - c/w IV bumex tid per HF  - Strict in and out  - Daily weights  - On metoprolol  - Heart Failure on board    #Right knee pain  - knee xray showing effusion, possible in setting of fall  - continue to monitor for pain  - dw ortho - no plan for tapping given low concern for septic knee  - check CT knee    #Fever  - CXR noted with ?Atelectasis vs edema vs infx  - on empiric Zosyn  - swallow eval pending   - Bcx NGTD    #Coronary artery disease  - Continue on aspirin, ticagrelor, and atorvastatin  - Recent cardiac catheterization with PCI  - initial trop 140 -> 118  - pt with recurrent chest pain, high up in chest, EKG trop ordered  - given maalox as well     #Chronic kidney disease  - Renal function improved from prior values  - Continue to monitor while on diuresis    #Hyponatremia  - Hypervolemic on presentation  - Bumetanide for diuresis  - serial BMP    #Diabetes  - Insulin coverage  - Close monitoring of blood glucose levels  - Gabapentin for neuropathy    Dispo: Pending HF optimization and knee pain control, CT knee and swallow eval

## 2025-04-12 NOTE — PROGRESS NOTE ADULT - SUBJECTIVE AND OBJECTIVE BOX
Laura Sampson M.D.    Patient is a 74y old  Female who presents with a chief complaint of CHF (09 Apr 2025 13:11)      SUBJECTIVE / OVERNIGHT EVENTS: no event overnight. Reports some difficulty swallow. Chronic reproducible chest pain.     Patient denies abd pain, N/V, fever, chills, dysuria or any other complaints. All remainder ROS negative.     MEDICATIONS  (STANDING):  acetaminophen     Tablet .. 650 milliGRAM(s) Oral every 8 hours  aspirin  chewable 81 milliGRAM(s) Oral daily  atorvastatin 80 milliGRAM(s) Oral at bedtime  buMETAnide Injectable 2 milliGRAM(s) IV Push <User Schedule>  dextrose 5%. 1000 milliLiter(s) (50 mL/Hr) IV Continuous <Continuous>  dextrose 5%. 1000 milliLiter(s) (100 mL/Hr) IV Continuous <Continuous>  dextrose 50% Injectable 25 Gram(s) IV Push once  dextrose 50% Injectable 12.5 Gram(s) IV Push once  dextrose 50% Injectable 25 Gram(s) IV Push once  gabapentin 400 milliGRAM(s) Oral daily  glucagon  Injectable 1 milliGRAM(s) IntraMuscular once  hydrALAZINE 50 milliGRAM(s) Oral three times a day  insulin glargine Injectable (LANTUS) 25 Unit(s) SubCutaneous at bedtime  insulin lispro (ADMELOG) corrective regimen sliding scale   SubCutaneous three times a day before meals  isosorbide   mononitrate ER Tablet (IMDUR) 30 milliGRAM(s) Oral daily  lidocaine   4% Patch 1 Patch Transdermal daily  metoprolol succinate ER 50 milliGRAM(s) Oral daily  piperacillin/tazobactam IVPB.. 3.375 Gram(s) IV Intermittent every 8 hours  sacubitril 24 mG/valsartan 26 mG 1 Tablet(s) Oral two times a day  ticagrelor 60 milliGRAM(s) Oral every 12 hours    MEDICATIONS  (PRN):  aluminum hydroxide/magnesium hydroxide/simethicone Suspension 30 milliLiter(s) Oral every 4 hours PRN Dyspepsia  dextrose Oral Gel 15 Gram(s) Oral once PRN Blood Glucose LESS THAN 70 milliGRAM(s)/deciliter    I&O's Summary    11 Apr 2025 07:01  -  12 Apr 2025 07:00  --------------------------------------------------------  IN: 575 mL / OUT: 2450 mL / NET: -1875 mL    PHYSICAL EXAM:  Vital Signs Last 24 Hrs  T(C): 36.5 (12 Apr 2025 08:07), Max: 36.9 (12 Apr 2025 05:00)  T(F): 97.7 (12 Apr 2025 08:07), Max: 98.4 (12 Apr 2025 05:00)  HR: 64 (12 Apr 2025 08:07) (60 - 74)  BP: 133/65 (12 Apr 2025 08:07) (123/54 - 153/67)  BP(mean): --  RR: 18 (12 Apr 2025 08:07) (17 - 19)  SpO2: 98% (12 Apr 2025 08:07) (97% - 100%)    Parameters below as of 12 Apr 2025 08:07  Patient On (Oxygen Delivery Method): room air    CONSTITUTIONAL: NAD, on NC  RESPIRATORY: Normal respiratory effort; lungs are clear to auscultation bilaterally  CARDIOVASCULAR: Regular rate and rhythm, on LE edema. Reproducible CP  ABDOMEN: Nontender to palpation, normoactive bowel soundss  MSK: R knee swollen     LABS:                        10.5   11.51 )-----------( 519      ( 12 Apr 2025 07:48 )             32.4     04-12    127[L]  |  90[L]  |  28.1[H]  ----------------------------<  124[H]  4.3   |  21.0[L]  |  1.88[H]    Ca    7.2[L]      12 Apr 2025 07:48  Mg     2.2     04-12    TPro  6.2[L]  /  Alb  2.9[L]  /  TBili  0.6  /  DBili  x   /  AST  16  /  ALT  21  /  AlkPhos  135[H]  04-11      Urinalysis Basic - ( 12 Apr 2025 07:48 )    Color: x / Appearance: x / SG: x / pH: x  Gluc: 124 mg/dL / Ketone: x  / Bili: x / Urobili: x   Blood: x / Protein: x / Nitrite: x   Leuk Esterase: x / RBC: x / WBC x   Sq Epi: x / Non Sq Epi: x / Bacteria: x      Culture - Blood (collected 11 Apr 2025 00:52)  Source: Blood Blood-Peripheral  Preliminary Report (12 Apr 2025 07:01):    No growth at 24 hours    Culture - Blood (collected 11 Apr 2025 00:46)  Source: Blood Blood-Peripheral  Preliminary Report (12 Apr 2025 07:01):    No growth at 24 hours      CAPILLARY BLOOD GLUCOSE      POCT Blood Glucose.: 192 mg/dL (12 Apr 2025 12:04)  POCT Blood Glucose.: 124 mg/dL (12 Apr 2025 07:54)  POCT Blood Glucose.: 181 mg/dL (11 Apr 2025 21:01)  POCT Blood Glucose.: 209 mg/dL (11 Apr 2025 17:25)      RADIOLOGY & ADDITIONAL TESTS:  Results Reviewed:   Imaging Personally Reviewed:  Electrocardiogram Personally Reviewed:

## 2025-04-13 LAB
ANION GAP SERPL CALC-SCNC: 16 MMOL/L — SIGNIFICANT CHANGE UP (ref 5–17)
B PERT IGG+IGM PNL SER: ABNORMAL
BUN SERPL-MCNC: 29.3 MG/DL — HIGH (ref 8–20)
CALCIUM SERPL-MCNC: 7 MG/DL — LOW (ref 8.4–10.5)
CHLORIDE SERPL-SCNC: 93 MMOL/L — LOW (ref 96–108)
CO2 SERPL-SCNC: 21 MMOL/L — LOW (ref 22–29)
COLOR FLD: ABNORMAL
CREAT SERPL-MCNC: 1.98 MG/DL — HIGH (ref 0.5–1.3)
CRP SERPL-MCNC: 81 MG/L — HIGH
EGFR: 26 ML/MIN/1.73M2 — LOW
EGFR: 26 ML/MIN/1.73M2 — LOW
ERYTHROCYTE [SEDIMENTATION RATE] IN BLOOD: 56 MM/HR — HIGH (ref 0–20)
FLUID INTAKE SUBSTANCE CLASS: SIGNIFICANT CHANGE UP
GLUCOSE BLDC GLUCOMTR-MCNC: 104 MG/DL — HIGH (ref 70–99)
GLUCOSE BLDC GLUCOMTR-MCNC: 112 MG/DL — HIGH (ref 70–99)
GLUCOSE BLDC GLUCOMTR-MCNC: 75 MG/DL — SIGNIFICANT CHANGE UP (ref 70–99)
GLUCOSE SERPL-MCNC: 108 MG/DL — HIGH (ref 70–99)
HCT VFR BLD CALC: 28.6 % — LOW (ref 34.5–45)
HGB BLD-MCNC: 9.6 G/DL — LOW (ref 11.5–15.5)
LYMPHOCYTES # FLD: 32 % — SIGNIFICANT CHANGE UP
MCHC RBC-ENTMCNC: 29.4 PG — SIGNIFICANT CHANGE UP (ref 27–34)
MCHC RBC-ENTMCNC: 33.6 G/DL — SIGNIFICANT CHANGE UP (ref 32–36)
MCV RBC AUTO: 87.5 FL — SIGNIFICANT CHANGE UP (ref 80–100)
NEUTROPHILS-BODY FLUID: 68 % — SIGNIFICANT CHANGE UP
NRBC # BLD AUTO: 0 K/UL — SIGNIFICANT CHANGE UP (ref 0–0)
NRBC # FLD: 0 K/UL — SIGNIFICANT CHANGE UP (ref 0–0)
NRBC BLD AUTO-RTO: 0 /100 WBCS — SIGNIFICANT CHANGE UP (ref 0–0)
PLATELET # BLD AUTO: 531 K/UL — HIGH (ref 150–400)
PMV BLD: 9.3 FL — SIGNIFICANT CHANGE UP (ref 7–13)
POTASSIUM SERPL-MCNC: 3.8 MMOL/L — SIGNIFICANT CHANGE UP (ref 3.5–5.3)
POTASSIUM SERPL-SCNC: 3.8 MMOL/L — SIGNIFICANT CHANGE UP (ref 3.5–5.3)
RBC # BLD: 3.27 M/UL — LOW (ref 3.8–5.2)
RBC # FLD: 18 % — HIGH (ref 10.3–14.5)
RCV VOL RI: SIGNIFICANT CHANGE UP CELLS/UL
SODIUM SERPL-SCNC: 130 MMOL/L — LOW (ref 135–145)
SYNOVIAL CRYSTALS CLARITY: ABNORMAL
SYNOVIAL CRYSTALS COLOR: ABNORMAL
SYNOVIAL CRYSTALS ID: SIGNIFICANT CHANGE UP
SYNOVIAL CRYSTALS TUBE: SIGNIFICANT CHANGE UP
TOTAL NUCLEATED CELL COUNT, BODY FLUID: 3669 CELLS/UL — SIGNIFICANT CHANGE UP
TUBE TYPE: SIGNIFICANT CHANGE UP
WBC # BLD: 10.45 K/UL — SIGNIFICANT CHANGE UP (ref 3.8–10.5)
WBC # FLD AUTO: 10.45 K/UL — SIGNIFICANT CHANGE UP (ref 3.8–10.5)
WBC COUNT.: 3626 CELLS/UL — SIGNIFICANT CHANGE UP

## 2025-04-13 PROCEDURE — 73700 CT LOWER EXTREMITY W/O DYE: CPT | Mod: 26,RT

## 2025-04-13 PROCEDURE — 99233 SBSQ HOSP IP/OBS HIGH 50: CPT

## 2025-04-13 RX ORDER — OXYCODONE HYDROCHLORIDE 30 MG/1
5 TABLET ORAL EVERY 6 HOURS
Refills: 0 | Status: DISCONTINUED | OUTPATIENT
Start: 2025-04-13 | End: 2025-04-15

## 2025-04-13 RX ORDER — LIDOCAINE HYDROCHLORIDE 20 MG/ML
1 JELLY TOPICAL DAILY
Refills: 0 | Status: DISCONTINUED | OUTPATIENT
Start: 2025-04-13 | End: 2025-04-14

## 2025-04-13 RX ORDER — ONDANSETRON HCL/PF 4 MG/2 ML
4 VIAL (ML) INJECTION ONCE
Refills: 0 | Status: COMPLETED | OUTPATIENT
Start: 2025-04-13 | End: 2025-04-13

## 2025-04-13 RX ADMIN — TICAGRELOR 60 MILLIGRAM(S): 90 TABLET ORAL at 17:02

## 2025-04-13 RX ADMIN — GABAPENTIN 400 MILLIGRAM(S): 400 CAPSULE ORAL at 12:32

## 2025-04-13 RX ADMIN — Medication 650 MILLIGRAM(S): at 06:23

## 2025-04-13 RX ADMIN — INSULIN GLARGINE-YFGN 25 UNIT(S): 100 INJECTION, SOLUTION SUBCUTANEOUS at 21:51

## 2025-04-13 RX ADMIN — TICAGRELOR 60 MILLIGRAM(S): 90 TABLET ORAL at 06:23

## 2025-04-13 RX ADMIN — Medication 25 GRAM(S): at 21:51

## 2025-04-13 RX ADMIN — LIDOCAINE HYDROCHLORIDE 1 PATCH: 20 JELLY TOPICAL at 12:31

## 2025-04-13 RX ADMIN — ISOSORBIDE MONONITRATE 30 MILLIGRAM(S): 60 TABLET, EXTENDED RELEASE ORAL at 12:33

## 2025-04-13 RX ADMIN — Medication 650 MILLIGRAM(S): at 21:52

## 2025-04-13 RX ADMIN — Medication 650 MILLIGRAM(S): at 14:55

## 2025-04-13 RX ADMIN — Medication 81 MILLIGRAM(S): at 12:32

## 2025-04-13 RX ADMIN — Medication 50 MILLIGRAM(S): at 21:52

## 2025-04-13 RX ADMIN — SACUBITRIL AND VALSARTAN 1 TABLET(S): 6; 6 PELLET ORAL at 17:02

## 2025-04-13 RX ADMIN — Medication 4 MILLIGRAM(S): at 20:38

## 2025-04-13 RX ADMIN — Medication 50 MILLIGRAM(S): at 13:40

## 2025-04-13 RX ADMIN — Medication 25 GRAM(S): at 06:43

## 2025-04-13 RX ADMIN — Medication 50 MILLIGRAM(S): at 06:23

## 2025-04-13 RX ADMIN — BUMETANIDE 2 MILLIGRAM(S): 1 TABLET ORAL at 12:36

## 2025-04-13 RX ADMIN — ATORVASTATIN CALCIUM 80 MILLIGRAM(S): 80 TABLET, FILM COATED ORAL at 21:52

## 2025-04-13 RX ADMIN — SACUBITRIL AND VALSARTAN 1 TABLET(S): 6; 6 PELLET ORAL at 06:22

## 2025-04-13 RX ADMIN — OXYCODONE HYDROCHLORIDE 5 MILLIGRAM(S): 30 TABLET ORAL at 12:36

## 2025-04-13 RX ADMIN — BUMETANIDE 2 MILLIGRAM(S): 1 TABLET ORAL at 06:22

## 2025-04-13 RX ADMIN — Medication 25 GRAM(S): at 13:40

## 2025-04-13 RX ADMIN — BUMETANIDE 2 MILLIGRAM(S): 1 TABLET ORAL at 17:02

## 2025-04-13 RX ADMIN — METOPROLOL SUCCINATE 50 MILLIGRAM(S): 50 TABLET, EXTENDED RELEASE ORAL at 06:22

## 2025-04-13 RX ADMIN — Medication 650 MILLIGRAM(S): at 13:39

## 2025-04-13 RX ADMIN — Medication 650 MILLIGRAM(S): at 07:46

## 2025-04-13 RX ADMIN — OXYCODONE HYDROCHLORIDE 5 MILLIGRAM(S): 30 TABLET ORAL at 13:53

## 2025-04-13 NOTE — PROGRESS NOTE ADULT - ASSESSMENT
74yoF with a history of coronary artery disease, heart failure, chronic kidney disease, hypertension, diabetes, and anemia who was recently hospitalized for NSTEMI with PCI and bacteremia who presented with dyspnea on exertion and weight gain.    #Acute on chronic systolic heart failure  - Recent echocardiogram noted ejection fraction of 31% with multiple segmental abnormalities  - c/w IV bumex tid per HF  - Strict in and out  - Daily weights  - On metoprolol  - Heart Failure on board    #Right knee pain  - knee xray showing effusion, possible in setting of fall  - pain regimen adjusted  - dw ortho - no plan for tapping given low concern for septic knee  - check CT knee  - if persistent pain, will obtain pain management eval     #Fever  - CXR noted with ?Atelectasis vs edema vs infx  - on empiric Zosyn  - swallow eval pending   - Bcx NGTD    #Coronary artery disease  - Continue on aspirin, ticagrelor, and atorvastatin  - Recent cardiac catheterization with PCI  - initial trop 140 -> 118  - pt with recurrent chest pain, high up in chest, EKG trop ordered  - given maalox as well     #Chronic kidney disease  - Renal function improved from prior values  - Continue to monitor while on diuresis    #Hyponatremia  - Hypervolemic on presentation  - Bumetanide for diuresis  - serial BMP    #Diabetes  - Insulin coverage  - Close monitoring of blood glucose levels  - Gabapentin for neuropathy    DVT ppx: ASA/Brilinta   Dispo: Pending HF optimization and knee pain control, CT knee and swallow eval   PT eval pending  74yoF with a history of coronary artery disease, heart failure, chronic kidney disease, hypertension, diabetes, and anemia who was recently hospitalized for NSTEMI with PCI and bacteremia who presented with dyspnea on exertion and weight gain.    #Acute on chronic systolic heart failure  - Recent echocardiogram noted ejection fraction of 31% with multiple segmental abnormalities  - c/w IV bumex tid per HF  - Strict in and out  - Daily weights  - On metoprolol  - Heart Failure on board    #Right knee pain  - knee xray showing effusion, possible in setting of fall  - pain regimen adjusted  - per ortho - no need for tap as long as septic knee concern is low   - check CT knee  - if persistent pain, will obtain pain management eval     #Fever  - CXR noted with ?Atelectasis vs edema vs infx  - on empiric Zosyn  - swallow eval pending   - Bcx NGTD    #Coronary artery disease  - Continue on aspirin, ticagrelor, and atorvastatin  - Recent cardiac catheterization with PCI  - initial trop 140 -> 118  - pt with recurrent chest pain, high up in chest, EKG trop ordered  - given maalox as well     #Chronic kidney disease  - Renal function improved from prior values  - Continue to monitor while on diuresis    #Hyponatremia  - Hypervolemic on presentation  - Bumetanide for diuresis  - serial BMP    #Diabetes  - Insulin coverage  - Close monitoring of blood glucose levels  - Gabapentin for neuropathy    DVT ppx: ASA/Brilinta   Dispo: Pending HF optimization and knee pain control, CT knee and swallow eval   PT eval pending  74yoF with a history of coronary artery disease, heart failure, chronic kidney disease, hypertension, diabetes, and anemia who was recently hospitalized for NSTEMI with PCI and bacteremia who presented with dyspnea on exertion and weight gain.    #Acute on chronic systolic heart failure  - Recent echocardiogram noted ejection fraction of 31% with multiple segmental abnormalities  - c/w IV bumex tid per HF  - Strict in and out  - Daily weights  - On metoprolol  - Heart Failure on board    #Right knee pain  - knee xray showing effusion, possible in setting of fall  - pain regimen adjusted  - check CT knee  - ortho consulted, pending recs    #Fever  - CXR noted with ?Atelectasis vs edema vs infx  - on empiric Zosyn  - swallow eval pending   - Bcx NGTD    #Coronary artery disease  - Continue on aspirin, ticagrelor, and atorvastatin  - Recent cardiac catheterization with PCI  - initial trop 140 -> 118  - pt with recurrent chest pain, high up in chest, EKG trop ordered  - given maalox as well     #Chronic kidney disease  - Renal function improved from prior values  - Continue to monitor while on diuresis    #Hyponatremia  - Hypervolemic on presentation  - Bumetanide for diuresis  - serial BMP    #Diabetes  - Insulin coverage  - Close monitoring of blood glucose levels  - Gabapentin for neuropathy    DVT ppx: ASA/Brilinta   Dispo: Pending HF optimization and knee pain control, CT knee and swallow eval   PT eval pending

## 2025-04-13 NOTE — PROGRESS NOTE ADULT - ASSESSMENT
74F hx HTN, CAD S/P PCI x3 (01/13/2025), HFrEF, CardioMEMS, heart block S/P micra PPM, PAD, CVA, DM, CKD. Patient was sent to [facility] ED from cardiology office with complaints of SOB and lower extremity swelling x3 days. Patient endorses fever and cough. Swelling was not improving despite increasing PO doses of Lasix at home. CardioMEMS readings were at 24, goal is 14, and patient has also noted weight gain. Patient states she has been experiencing exertional chest pain that improves with rest. Denies headaches, dizziness, palpitations, nausea, or diaphoresis. Denies recent sick contacts or recent travel. Cardiology was consulted for HF exacerbation and patient follows with Dr. Allen. On presentation, initial troponin was 906.

## 2025-04-13 NOTE — PROGRESS NOTE ADULT - PROBLEM SELECTOR PLAN 1
ICM, LVEF 25-30% on 3/21 (previously 35-40%).    TTE from 12/2024 with EF of 39% with multiple segment abnormalities, mild-mod MR, and mild TR  TTE:  3/21/25:  EF 20-25%, AWM, Grade II DD -  Repeat when optimized - limited -  prior to discharge.    Patient is improving, decreased pedal edema and Hyponatremia sodium now 130.  Hyponatremia likely 2/2 hypervolemia.     Serum proBNP >68K from 4/ 9/25  Diuretics: Continue Bumex 2 mg IVP TID - creatinine increasing to 1.98 - Decrease to bid  tomorrow  GDMT:  Entresto 24-26 mg BID -  unable to up tritrate Entresto - creatinine slight increase.    Continue Imdur 30 mg daily,   Hydralazine 50 mg TID,   Toprol-XL 50 mg daily.   Unable to add MRA today due to increasing creatinine - check in am.    No Farxiga 2/2 h/o pyelonephritis.   Device: s/p Micra PPM with chronic . Can consider device upgrade to CRT in the future as an outpatient.  Low Na, 1.5L FR diet recommended  Monitor on telemetry.  Strict i/o and daily weights.  Keep K > 4, Mg > 2.  Monitor renal function with ongoing diuresis.

## 2025-04-13 NOTE — CONSULT NOTE ADULT - SUBJECTIVE AND OBJECTIVE BOX
Pt Name: SHIREEN CASH    MRN: [id number]      Patient is a 74y Female with a past medical history of peripheral artery disease, CVA, HTN and DM. Orthopedics was consulted due to right knee swelling that was noted yesterday by medicine team. Of note, medicine team stated the patient sustained a fall about one month ago. Patient states she has pain about the right knee as well as pain with range of motion. Denies numbness, tingling, decreased sensation. No other orthopedic complaints at this time.     REVIEW OF SYSTEMS    General: Alert, responsive, in NAD    Skin: No rashes, no pruritis     Musculoskeletal: SEE HPI.    Neurological: No sensory or motor changes.         PAST MEDICAL & SURGICAL HISTORY:  PAST MEDICAL & SURGICAL HISTORY:  DM (diabetes mellitus)      HTN (hypertension)      H/O gastroesophageal reflux (GERD)      Cardiac pacemaker  MICRA for mobitz type 11      CVA (cerebrovascular accident)  resdiual right sided weakness      CVA (cerebrovascular accident)      PAD (peripheral artery disease)      Wound of right leg      History of benign brain tumor      Cataract      History of biopsy      Cardiac pacemaker      S/P angiogram of extremity          Allergies: No Known Allergies      Medications: acetaminophen     Tablet .. 650 milliGRAM(s) Oral every 8 hours  aluminum hydroxide/magnesium hydroxide/simethicone Suspension 30 milliLiter(s) Oral every 4 hours PRN  aspirin  chewable 81 milliGRAM(s) Oral daily  atorvastatin 80 milliGRAM(s) Oral at bedtime  buMETAnide Injectable 2 milliGRAM(s) IV Push <User Schedule>  dextrose 5%. 1000 milliLiter(s) IV Continuous <Continuous>  dextrose 5%. 1000 milliLiter(s) IV Continuous <Continuous>  dextrose 50% Injectable 25 Gram(s) IV Push once  dextrose 50% Injectable 12.5 Gram(s) IV Push once  dextrose 50% Injectable 25 Gram(s) IV Push once  dextrose Oral Gel 15 Gram(s) Oral once PRN  gabapentin 400 milliGRAM(s) Oral daily  glucagon  Injectable 1 milliGRAM(s) IntraMuscular once  hydrALAZINE 50 milliGRAM(s) Oral three times a day  insulin glargine Injectable (LANTUS) 25 Unit(s) SubCutaneous at bedtime  insulin lispro (ADMELOG) corrective regimen sliding scale   SubCutaneous three times a day before meals  isosorbide   mononitrate ER Tablet (IMDUR) 30 milliGRAM(s) Oral daily  lidocaine   4% Patch 1 Patch Transdermal daily  metoprolol succinate ER 50 milliGRAM(s) Oral daily  oxyCODONE    IR 5 milliGRAM(s) Oral every 6 hours PRN  piperacillin/tazobactam IVPB.. 3.375 Gram(s) IV Intermittent every 8 hours  sacubitril 24 mG/valsartan 26 mG 1 Tablet(s) Oral two times a day  ticagrelor 60 milliGRAM(s) Oral every 12 hours      FAMILY HISTORY:  FH: asthma  Son    : non-contributory    Social History:                           9.6    10.45 )-----------( 531      ( 2025 05:10 )             28.6       04-13    130[L]  |  93[L]  |  29.3[H]  ----------------------------<  108[H]  3.8   |  21.0[L]  |  1.98[H]    Ca    7.0[L]      2025 05:10  Mg     2.2     04-12        Vital Signs Last 24 Hrs  T(C): 36.5 (2025 08:00), Max: 37 (2025 16:30)  T(F): 97.7 (2025 08:00), Max: 98.6 (2025 16:30)  HR: 66 (2025 08:00) (65 - 75)  BP: 126/67 (2025 08:00) (112/66 - 141/70)  BP(mean): --  RR: 17 (2025 08:00) (17 - 18)  SpO2: 98% (2025 08:00) (98% - 100%)    Parameters below as of 2025 08:00  Patient On (Oxygen Delivery Method): room air        Daily     Daily Weight in k.6 (2025 16:30)      PHYSICAL EXAM:      Appearance: Alert, responsive, in no acute distress.    Neurological: Sensation is grossly intact to light touch. No focal deficits or weaknesses found.    Skin: no rash on visible skin. Skin is clean, dry and intact. No bleeding. No abrasions. No ulcerations.    Vascular: 2+ distal pulses. Cap refill < 2 sec. No signs of venous insufficiency or stasis. No extremity ulcerations. No cyanosis.    Musculoskeletal:      Left Lower Extremity: Hip: NTTP, FROM. HF/HE intact. Knee: NTTP, FROM. KE/KF intact. Ankle: NTTP, FROM. DF/PF/EHL/FHL intact. Compartments soft compressible. Sensation intact to light touch.  Brisk capillary refill, distal pulses +       Right Lower Extremity: Hip: NTTP, FROM. HF/HE intact. Knee: TTP about the knee. KE/KF intact but limited due to pain and swelling. can passively range the knee to about 90 degrees with discomfort. significant swelling about the knee, no erythema noted.   Ankle: NTTP, FROM. DF/PF/EHL/FHL intact. Compartments soft compressible. Sensation intact to light touch.  Brisk capillary refill, distal pulses +    Imaging Studies:  < from: CT Knee No Cont, Right (25 @ 09:11) >    ACC: [id number] EXAM:  CT KNEE ONLY RT   ORDERED BY: MICAH PAK     PROCEDURE DATE:  2025          INTERPRETATION:  CT OF THE RIGHT KNEE    CLINICAL INFORMATION: Right knee pain with large joint effusion seen on   x-ray. Evaluate for occult fracture.  TECHNIQUE: Multidetector CT of the right knee. The study was performed   without the use of intravenous or intra-articular contrast. Multiplanar   reformats were generated for review. Three dimensional reconstructions   were obtained on an independent workstation. The interpretation of these   images is included in the body of the report of the main portion of the   study.    COMPARISON: Right knee radiographs 4/10/2025.    FINDINGS:    BONE: Mild osteopenia. No acute fracture. No focal lytic or blastic   lesions. Enthesopathy at the attachment of the quadriceps tendon on the   patella.    JOINTS: No dislocation. Large knee joint effusion. No fat-fluid level. A   small amount of hyperdense material is seen layering dependently along   the lateral aspect of the suprapatellar recess which may be a small   amount of hemarthrosis (4:19). Tricompartmental cartilage space narrowing   and marginal osteophyte formation, most pronounced in the patellofemoral   compartment. Chondrocalcinosis.    SOFT TISSUE: Mild diffuse muscle atrophy. Vascular calcifications.   Vessels are otherwise normal in course and caliber. Edema in the   subcutaneous fat along the posterior aspect of the knee joint.      IMPRESSION:  1.  No acute fracture or dislocation.  2.  Osteoarthritis of the knee joint with chondrocalcinosis.  3.  Large knee joint effusion. No fat-fluid level. Questionable small   amount of hemarthrosis. Findings may be reactive or secondary to CPPD   arthropathy.    --- End of Report ---            MICAELA TRUJILLO MD; Attending Radiologist  This document has been electronically signed. 2025 10:25AM    < end of copied text >      A/P:  Pt is a  74y Female with right knee pain and swelling.     PLAN:   * pending plan per Dr. Bustillo  Pt Name: SHIREEN CASH    MRN: [id number]      Patient is a 74y Female with a past medical history of peripheral artery disease, CVA, HTN and DM. Orthopedics was consulted due to right knee swelling that was noted yesterday by medicine team. Of note, medicine team stated the patient sustained a fall about one month ago. Patient states she has pain about the right knee as well as pain with range of motion. Denies numbness, tingling, decreased sensation. No other orthopedic complaints at this time.     REVIEW OF SYSTEMS    General: Alert, responsive, in NAD    Skin: No rashes, no pruritis     Musculoskeletal: SEE HPI.    Neurological: No sensory or motor changes.         PAST MEDICAL & SURGICAL HISTORY:  PAST MEDICAL & SURGICAL HISTORY:  DM (diabetes mellitus)      HTN (hypertension)      H/O gastroesophageal reflux (GERD)      Cardiac pacemaker  MICRA for mobitz type 11      CVA (cerebrovascular accident)  resdiual right sided weakness      CVA (cerebrovascular accident)      PAD (peripheral artery disease)      Wound of right leg      History of benign brain tumor      Cataract      History of biopsy      Cardiac pacemaker      S/P angiogram of extremity          Allergies: No Known Allergies      Medications: acetaminophen     Tablet .. 650 milliGRAM(s) Oral every 8 hours  aluminum hydroxide/magnesium hydroxide/simethicone Suspension 30 milliLiter(s) Oral every 4 hours PRN  aspirin  chewable 81 milliGRAM(s) Oral daily  atorvastatin 80 milliGRAM(s) Oral at bedtime  buMETAnide Injectable 2 milliGRAM(s) IV Push <User Schedule>  dextrose 5%. 1000 milliLiter(s) IV Continuous <Continuous>  dextrose 5%. 1000 milliLiter(s) IV Continuous <Continuous>  dextrose 50% Injectable 25 Gram(s) IV Push once  dextrose 50% Injectable 12.5 Gram(s) IV Push once  dextrose 50% Injectable 25 Gram(s) IV Push once  dextrose Oral Gel 15 Gram(s) Oral once PRN  gabapentin 400 milliGRAM(s) Oral daily  glucagon  Injectable 1 milliGRAM(s) IntraMuscular once  hydrALAZINE 50 milliGRAM(s) Oral three times a day  insulin glargine Injectable (LANTUS) 25 Unit(s) SubCutaneous at bedtime  insulin lispro (ADMELOG) corrective regimen sliding scale   SubCutaneous three times a day before meals  isosorbide   mononitrate ER Tablet (IMDUR) 30 milliGRAM(s) Oral daily  lidocaine   4% Patch 1 Patch Transdermal daily  metoprolol succinate ER 50 milliGRAM(s) Oral daily  oxyCODONE    IR 5 milliGRAM(s) Oral every 6 hours PRN  piperacillin/tazobactam IVPB.. 3.375 Gram(s) IV Intermittent every 8 hours  sacubitril 24 mG/valsartan 26 mG 1 Tablet(s) Oral two times a day  ticagrelor 60 milliGRAM(s) Oral every 12 hours      FAMILY HISTORY:  FH: asthma  Son    : non-contributory    Social History:                           9.6    10.45 )-----------( 531      ( 2025 05:10 )             28.6       04-13    130[L]  |  93[L]  |  29.3[H]  ----------------------------<  108[H]  3.8   |  21.0[L]  |  1.98[H]    Ca    7.0[L]      2025 05:10  Mg     2.2     04-12        Vital Signs Last 24 Hrs  T(C): 36.5 (2025 08:00), Max: 37 (2025 16:30)  T(F): 97.7 (2025 08:00), Max: 98.6 (2025 16:30)  HR: 66 (2025 08:00) (65 - 75)  BP: 126/67 (2025 08:00) (112/66 - 141/70)  BP(mean): --  RR: 17 (2025 08:00) (17 - 18)  SpO2: 98% (2025 08:00) (98% - 100%)    Parameters below as of 2025 08:00  Patient On (Oxygen Delivery Method): room air        Daily     Daily Weight in k.6 (2025 16:30)      PHYSICAL EXAM:      Appearance: Alert, responsive, in no acute distress.    Neurological: Sensation is grossly intact to light touch. No focal deficits or weaknesses found.    Skin: no rash on visible skin. Skin is clean, dry and intact. No bleeding. No abrasions. No ulcerations.    Vascular: 2+ distal pulses. Cap refill < 2 sec. No signs of venous insufficiency or stasis. No extremity ulcerations. No cyanosis.    Musculoskeletal:      Left Lower Extremity: Hip: NTTP, FROM. HF/HE intact. Knee: NTTP, FROM. KE/KF intact. Ankle: NTTP, FROM. DF/PF/EHL/FHL intact. Compartments soft compressible. Sensation intact to light touch.  Brisk capillary refill, distal pulses +       Right Lower Extremity: Hip: NTTP, FROM. HF/HE intact. Knee: TTP about the knee. KE/KF intact but limited due to pain and swelling. can passively range the knee to about 90 degrees with discomfort. significant swelling about the knee, no erythema noted.   Ankle: NTTP, FROM. DF/PF/EHL/FHL intact. Compartments soft compressible. Sensation intact to light touch.  Brisk capillary refill, distal pulses +    Imaging Studies:  < from: CT Knee No Cont, Right (25 @ 09:11) >    ACC: [id number] EXAM:  CT KNEE ONLY RT   ORDERED BY: MICAH PAK     PROCEDURE DATE:  2025          INTERPRETATION:  CT OF THE RIGHT KNEE    CLINICAL INFORMATION: Right knee pain with large joint effusion seen on   x-ray. Evaluate for occult fracture.  TECHNIQUE: Multidetector CT of the right knee. The study was performed   without the use of intravenous or intra-articular contrast. Multiplanar   reformats were generated for review. Three dimensional reconstructions   were obtained on an independent workstation. The interpretation of these   images is included in the body of the report of the main portion of the   study.    COMPARISON: Right knee radiographs 4/10/2025.    FINDINGS:    BONE: Mild osteopenia. No acute fracture. No focal lytic or blastic   lesions. Enthesopathy at the attachment of the quadriceps tendon on the   patella.    JOINTS: No dislocation. Large knee joint effusion. No fat-fluid level. A   small amount of hyperdense material is seen layering dependently along   the lateral aspect of the suprapatellar recess which may be a small   amount of hemarthrosis (4:19). Tricompartmental cartilage space narrowing   and marginal osteophyte formation, most pronounced in the patellofemoral   compartment. Chondrocalcinosis.    SOFT TISSUE: Mild diffuse muscle atrophy. Vascular calcifications.   Vessels are otherwise normal in course and caliber. Edema in the   subcutaneous fat along the posterior aspect of the knee joint.      IMPRESSION:  1.  No acute fracture or dislocation.  2.  Osteoarthritis of the knee joint with chondrocalcinosis.  3.  Large knee joint effusion. No fat-fluid level. Questionable small   amount of hemarthrosis. Findings may be reactive or secondary to CPPD   arthropathy.    --- End of Report ---            MICAELA TRUJILLO MD; Attending Radiologist  This document has been electronically signed. 2025 10:25AM    < end of copied text >    ARTHROCENTSIS  PROCEDURE NOTE: Arthrocentesis    Performed by: Brittany Felix PA-C     Indication: Effusion / rule out septic joint    Consent: the risks and benefits of arthrocentesis discussed with patient, including the risk of bleeding, infection, and technical failure. The risks of not performing the procedure, failure to diagnose septic joint with resultant systemic infection, discussed with the patient. The alternatives of performing the procedure also discussed. Written consent was obtained following the discussion.    Universal Protocol: A time out was performed and the correct patient and site were verified.    The right knee joint was prepped and draped in proper sterile fashion.   A [ 18 ] gauge needle was used to aspirate fluid from the joint using appropriate anatomic landmarks. 40 cc of fluid was obtained from the joint. The fluid was then sent to the lab for appropriate studies. The site was bandaged and no complications were noted. The patient tolerated the procedure well.    Post-Procedure Diagnosis: Effusion  Complications: None  Specimens Removed: fluid only  Prosthetic devices/implants:  total joint      A/P:  Pt is a  74y Female with right knee pain and swelling.     PLAN:   * Joint aspiration recommended and performed  * Activity: WBAT RLE   * Pain control as clinically indicated   * follow up joint cultures, PCR, cell count and crystals  Pt Name: SHIREEN CASH    MRN: [id number]      Patient is a 74y Female with a past medical history of peripheral artery disease, CVA, HTN and DM. Orthopedics was consulted due to right knee swelling that was noted yesterday by medicine team. Of note, medicine team stated the patient sustained a fall about one month ago. Patient states she has pain about the right knee as well as pain with range of motion. Denies numbness, tingling, decreased sensation. No other orthopedic complaints at this time.     REVIEW OF SYSTEMS    General: Alert, responsive, in NAD    Skin: No rashes, no pruritis     Musculoskeletal: SEE HPI.    Neurological: No sensory or motor changes.         PAST MEDICAL & SURGICAL HISTORY:  PAST MEDICAL & SURGICAL HISTORY:  DM (diabetes mellitus)      HTN (hypertension)      H/O gastroesophageal reflux (GERD)      Cardiac pacemaker  MICRA for mobitz type 11      CVA (cerebrovascular accident)  resdiual right sided weakness      CVA (cerebrovascular accident)      PAD (peripheral artery disease)      Wound of right leg      History of benign brain tumor      Cataract      History of biopsy      Cardiac pacemaker      S/P angiogram of extremity          Allergies: No Known Allergies      Medications: acetaminophen     Tablet .. 650 milliGRAM(s) Oral every 8 hours  aluminum hydroxide/magnesium hydroxide/simethicone Suspension 30 milliLiter(s) Oral every 4 hours PRN  aspirin  chewable 81 milliGRAM(s) Oral daily  atorvastatin 80 milliGRAM(s) Oral at bedtime  buMETAnide Injectable 2 milliGRAM(s) IV Push <User Schedule>  dextrose 5%. 1000 milliLiter(s) IV Continuous <Continuous>  dextrose 5%. 1000 milliLiter(s) IV Continuous <Continuous>  dextrose 50% Injectable 25 Gram(s) IV Push once  dextrose 50% Injectable 12.5 Gram(s) IV Push once  dextrose 50% Injectable 25 Gram(s) IV Push once  dextrose Oral Gel 15 Gram(s) Oral once PRN  gabapentin 400 milliGRAM(s) Oral daily  glucagon  Injectable 1 milliGRAM(s) IntraMuscular once  hydrALAZINE 50 milliGRAM(s) Oral three times a day  insulin glargine Injectable (LANTUS) 25 Unit(s) SubCutaneous at bedtime  insulin lispro (ADMELOG) corrective regimen sliding scale   SubCutaneous three times a day before meals  isosorbide   mononitrate ER Tablet (IMDUR) 30 milliGRAM(s) Oral daily  lidocaine   4% Patch 1 Patch Transdermal daily  metoprolol succinate ER 50 milliGRAM(s) Oral daily  oxyCODONE    IR 5 milliGRAM(s) Oral every 6 hours PRN  piperacillin/tazobactam IVPB.. 3.375 Gram(s) IV Intermittent every 8 hours  sacubitril 24 mG/valsartan 26 mG 1 Tablet(s) Oral two times a day  ticagrelor 60 milliGRAM(s) Oral every 12 hours      FAMILY HISTORY:  FH: asthma  Son    : non-contributory    Social History:                           9.6    10.45 )-----------( 531      ( 2025 05:10 )             28.6       04-13    130[L]  |  93[L]  |  29.3[H]  ----------------------------<  108[H]  3.8   |  21.0[L]  |  1.98[H]    Ca    7.0[L]      2025 05:10  Mg     2.2     04-12        Vital Signs Last 24 Hrs  T(C): 36.5 (2025 08:00), Max: 37 (2025 16:30)  T(F): 97.7 (2025 08:00), Max: 98.6 (2025 16:30)  HR: 66 (2025 08:00) (65 - 75)  BP: 126/67 (2025 08:00) (112/66 - 141/70)  BP(mean): --  RR: 17 (2025 08:00) (17 - 18)  SpO2: 98% (2025 08:00) (98% - 100%)    Parameters below as of 2025 08:00  Patient On (Oxygen Delivery Method): room air        Daily     Daily Weight in k.6 (2025 16:30)      PHYSICAL EXAM:      Appearance: Alert, responsive, in no acute distress.    Neurological: Sensation is grossly intact to light touch. No focal deficits or weaknesses found.    Skin: no rash on visible skin. Skin is clean, dry and intact. No bleeding. No abrasions. No ulcerations.    Vascular: 2+ distal pulses. Cap refill < 2 sec. No signs of venous insufficiency or stasis. No extremity ulcerations. No cyanosis.    Musculoskeletal:      Left Lower Extremity: Hip: NTTP, FROM. HF/HE intact. Knee: NTTP, FROM. KE/KF intact. Ankle: NTTP, FROM. DF/PF/EHL/FHL intact. Compartments soft compressible. Sensation intact to light touch.  Brisk capillary refill, distal pulses +       Right Lower Extremity: Hip: NTTP, FROM. HF/HE intact. Knee: TTP about the knee. KE/KF intact but limited due to pain and swelling. can passively range the knee to about 90 degrees with discomfort. significant swelling about the knee, no erythema noted.   Ankle: NTTP, FROM. DF/PF/EHL/FHL intact. Compartments soft compressible. Sensation intact to light touch.  Brisk capillary refill, distal pulses +    Imaging Studies:  < from: CT Knee No Cont, Right (25 @ 09:11) >    ACC: [id number] EXAM:  CT KNEE ONLY RT   ORDERED BY: MICAH PAK     PROCEDURE DATE:  2025          INTERPRETATION:  CT OF THE RIGHT KNEE    CLINICAL INFORMATION: Right knee pain with large joint effusion seen on   x-ray. Evaluate for occult fracture.  TECHNIQUE: Multidetector CT of the right knee. The study was performed   without the use of intravenous or intra-articular contrast. Multiplanar   reformats were generated for review. Three dimensional reconstructions   were obtained on an independent workstation. The interpretation of these   images is included in the body of the report of the main portion of the   study.    COMPARISON: Right knee radiographs 4/10/2025.    FINDINGS:    BONE: Mild osteopenia. No acute fracture. No focal lytic or blastic   lesions. Enthesopathy at the attachment of the quadriceps tendon on the   patella.    JOINTS: No dislocation. Large knee joint effusion. No fat-fluid level. A   small amount of hyperdense material is seen layering dependently along   the lateral aspect of the suprapatellar recess which may be a small   amount of hemarthrosis (4:19). Tricompartmental cartilage space narrowing   and marginal osteophyte formation, most pronounced in the patellofemoral   compartment. Chondrocalcinosis.    SOFT TISSUE: Mild diffuse muscle atrophy. Vascular calcifications.   Vessels are otherwise normal in course and caliber. Edema in the   subcutaneous fat along the posterior aspect of the knee joint.      IMPRESSION:  1.  No acute fracture or dislocation.  2.  Osteoarthritis of the knee joint with chondrocalcinosis.  3.  Large knee joint effusion. No fat-fluid level. Questionable small   amount of hemarthrosis. Findings may be reactive or secondary to CPPD   arthropathy.    --- End of Report ---            MICAELA TRUJILLO MD; Attending Radiologist  This document has been electronically signed. 2025 10:25AM    < end of copied text >    ARTHROCENTSIS  PROCEDURE NOTE: Arthrocentesis    Performed by: Brittany Felix PA-C     Indication: Effusion / rule out septic joint    Consent: the risks and benefits of arthrocentesis discussed with patient, including the risk of bleeding, infection, and technical failure. The risks of not performing the procedure, failure to diagnose septic joint with resultant systemic infection, discussed with the patient. The alternatives of performing the procedure also discussed. Written consent was obtained following the discussion.    Universal Protocol: A time out was performed and the correct patient and site were verified.    The right knee joint was prepped and draped in proper sterile fashion.   A [ 18 ] gauge needle was used to aspirate fluid from the joint using appropriate anatomic landmarks. 40 cc of fluid was obtained from the joint. The fluid was then sent to the lab for appropriate studies. The site was bandaged and no complications were noted. The patient tolerated the procedure well.    Post-Procedure Diagnosis: Effusion  Complications: None  Specimens Removed: fluid only  Prosthetic devices/implants:  total joint      A/P:  Pt is a  74y Female with right knee pain and swelling.     PLAN:   * case, plan and imaging discussed with Keny Stoll  * Joint aspiration recommended and performed  * Activity: WBAT RLE   * Pain control as clinically indicated   * follow up joint cultures, PCR, cell count and crystals

## 2025-04-13 NOTE — PROGRESS NOTE ADULT - PROBLEM SELECTOR PLAN 2
NSTEMI   EKG ventricular paced at 62, unchanged from previous  admits chest pain that improves with rest  No chest pain overnight  Troponin elevated - trended down  Cardiac cath on 01/13/2025 - RCA: Prox 70%, mid 80%, distal 90% stenosis; S/P Rotational atherectomy with RotaPro, IVUS (severe calcium) and PCI with 3 ZEINAB  Cardiac cath 3/21/25 - Culprit was 99% distal RCA instent restenosis. S/p PCI x 1 ZEINAB to RCA ISR.   CT DAPT":  Aspirin 81mg po daily and Brilinta 90mg po bid - Ct high dose statin.

## 2025-04-13 NOTE — PROGRESS NOTE ADULT - SUBJECTIVE AND OBJECTIVE BOX
Laura Sampson M.D.    Patient is a 74y old  Female who presents with a chief complaint of CHF (09 Apr 2025 13:11)      SUBJECTIVE / OVERNIGHT EVENTS: no event overnight. Still reports right knee pain.      Patient denies chest pain, SOB, abd pain, N/V, fever, chills, dysuria or any other complaints. All remainder ROS negative.     MEDICATIONS  (STANDING):  acetaminophen     Tablet .. 650 milliGRAM(s) Oral every 8 hours  aspirin  chewable 81 milliGRAM(s) Oral daily  atorvastatin 80 milliGRAM(s) Oral at bedtime  buMETAnide Injectable 2 milliGRAM(s) IV Push <User Schedule>  dextrose 5%. 1000 milliLiter(s) (50 mL/Hr) IV Continuous <Continuous>  dextrose 5%. 1000 milliLiter(s) (100 mL/Hr) IV Continuous <Continuous>  dextrose 50% Injectable 25 Gram(s) IV Push once  dextrose 50% Injectable 12.5 Gram(s) IV Push once  dextrose 50% Injectable 25 Gram(s) IV Push once  gabapentin 400 milliGRAM(s) Oral daily  glucagon  Injectable 1 milliGRAM(s) IntraMuscular once  hydrALAZINE 50 milliGRAM(s) Oral three times a day  insulin glargine Injectable (LANTUS) 25 Unit(s) SubCutaneous at bedtime  insulin lispro (ADMELOG) corrective regimen sliding scale   SubCutaneous three times a day before meals  isosorbide   mononitrate ER Tablet (IMDUR) 30 milliGRAM(s) Oral daily  lidocaine   4% Patch 1 Patch Transdermal daily  metoprolol succinate ER 50 milliGRAM(s) Oral daily  piperacillin/tazobactam IVPB.. 3.375 Gram(s) IV Intermittent every 8 hours  sacubitril 24 mG/valsartan 26 mG 1 Tablet(s) Oral two times a day  ticagrelor 60 milliGRAM(s) Oral every 12 hours    MEDICATIONS  (PRN):  aluminum hydroxide/magnesium hydroxide/simethicone Suspension 30 milliLiter(s) Oral every 4 hours PRN Dyspepsia  dextrose Oral Gel 15 Gram(s) Oral once PRN Blood Glucose LESS THAN 70 milliGRAM(s)/deciliter  oxyCODONE    IR 5 milliGRAM(s) Oral every 6 hours PRN Severe Pain (7 - 10)      I&O's Summary    12 Apr 2025 07:01  -  13 Apr 2025 07:00  --------------------------------------------------------  IN: 240 mL / OUT: 1200 mL / NET: -960 mL        PHYSICAL EXAM:  Vital Signs Last 24 Hrs  T(C): 36.5 (13 Apr 2025 08:00), Max: 37 (12 Apr 2025 16:30)  T(F): 97.7 (13 Apr 2025 08:00), Max: 98.6 (12 Apr 2025 16:30)  HR: 66 (13 Apr 2025 08:00) (65 - 75)  BP: 126/67 (13 Apr 2025 08:00) (112/66 - 141/70)  BP(mean): --  RR: 17 (13 Apr 2025 08:00) (17 - 18)  SpO2: 98% (13 Apr 2025 08:00) (98% - 100%)    Parameters below as of 13 Apr 2025 08:00  Patient On (Oxygen Delivery Method): room air    CONSTITUTIONAL: NAD, on NC  RESPIRATORY: Normal respiratory effort; lungs are clear to auscultation bilaterally  CARDIOVASCULAR: Regular rate and rhythm, on LE edema. Reproducible CP  ABDOMEN: Nontender to palpation, normoactive bowel soundss  MSK: R knee swollen, TTP     LABS:                        9.6    10.45 )-----------( 531      ( 13 Apr 2025 05:10 )             28.6     04-13    130[L]  |  93[L]  |  29.3[H]  ----------------------------<  108[H]  3.8   |  21.0[L]  |  1.98[H]    Ca    7.0[L]      13 Apr 2025 05:10  Mg     2.2     04-12            Urinalysis Basic - ( 13 Apr 2025 05:10 )    Color: x / Appearance: x / SG: x / pH: x  Gluc: 108 mg/dL / Ketone: x  / Bili: x / Urobili: x   Blood: x / Protein: x / Nitrite: x   Leuk Esterase: x / RBC: x / WBC x   Sq Epi: x / Non Sq Epi: x / Bacteria: x        Culture - Blood (collected 11 Apr 2025 00:52)  Source: Blood Blood-Peripheral  Preliminary Report (13 Apr 2025 07:00):    No growth at 48 Hours    Culture - Blood (collected 11 Apr 2025 00:46)  Source: Blood Blood-Peripheral  Preliminary Report (13 Apr 2025 07:00):    No growth at 48 Hours      CAPILLARY BLOOD GLUCOSE      POCT Blood Glucose.: 104 mg/dL (13 Apr 2025 08:11)  POCT Blood Glucose.: 191 mg/dL (12 Apr 2025 21:50)  POCT Blood Glucose.: 143 mg/dL (12 Apr 2025 17:20)  POCT Blood Glucose.: 192 mg/dL (12 Apr 2025 12:04)      RADIOLOGY & ADDITIONAL TESTS:  Results Reviewed:   Imaging Personally Reviewed:  Electrocardiogram Personally Reviewed:

## 2025-04-13 NOTE — PROGRESS NOTE ADULT - SUBJECTIVE AND OBJECTIVE BOX
[facility]                                                         CARDIOLOGY AT [facility]                                                                  [address] [facility] [place]-[id number] [place] [address]- [facility]ly [facility]                                                         Telephone: [phone]. Fax:[phone]                                                                             PROGRESS NOTE    Reason for follow up:   NSTEMI/HFrEF  Update:   Lying in bed, with no new complaints, sleeping comfortable    -960ml/23hours  RLE with continued swelling.  -DVT    Review of symptoms:   Cardiac:  No chest pain. No dyspnea. No palpitations.  Respiratory: no cough. No dyspnea  Gastrointestinal: No diarrhea. No abdominal pain. No bleeding.   Neuro: No focal neuro complaints.    Vitals:  T(C): 36.5 (04-13-25 @ 08:00), Max: 37 (04-12-25 @ 16:30)  HR: 66 (04-13-25 @ 08:00) (65 - 75)  BP: 126/67 (04-13-25 @ 08:00) (112/66 - 141/70)  RR: 17 (04-13-25 @ 08:00) (17 - 18)  SpO2: 98% (04-13-25 @ 08:00) (98% - 100%)  I&O's Summary  12 Apr 2025 07:01  -  13 Apr 2025 07:00  --------------------------------------------------------  IN: 240 mL / OUT: 1200 mL / NET: -960 mL  Weight (kg): 73.1 (04-11 @ 03:29)    PHYSICAL EXAM:  Appearance: Comfortable. No acute distress  HEENT:  Atraumatic. Normocephalic.  Normal oral mucosa  Neurologic: A & O x 3, no gross focal deficits.  Cardiovascular: RRR S1 S2, No murmur, no rubs/gallops. No JVD  Respiratory: Lungs clear to auscultation, unlabored   Gastrointestinal:  Soft, Non-tender, + BS  Lower Extremities: 2+ Peripheral Pulses, No clubbing, cyanosis, + edema RLC.    Psychiatry: Patient is calm. No agitation.   Skin: warm and dry.    CURRENT CARDIAC MEDICATIONS:  buMETAnide Injectable 2 milliGRAM(s) IV Push <User Schedule>  hydrALAZINE 50 milliGRAM(s) Oral three times a day  isosorbide   mononitrate ER Tablet (IMDUR) 30 milliGRAM(s) Oral daily  metoprolol succinate ER 50 milliGRAM(s) Oral daily  sacubitril 24 mG/valsartan 26 mG 1 Tablet(s) Oral two times a day    CURRENT OTHER MEDICATIONS:  piperacillin/tazobactam IVPB.. 3.375 Gram(s) IV Intermittent every 8 hours  acetaminophen     Tablet .. 650 milliGRAM(s) Oral every 8 hours  gabapentin 400 milliGRAM(s) Oral daily  oxyCODONE    IR 5 milliGRAM(s) Oral every 6 hours PRN Severe Pain (7 - 10)  aluminum hydroxide/magnesium hydroxide/simethicone Suspension 30 milliLiter(s) Oral every 4 hours PRN Dyspepsia  atorvastatin 80 milliGRAM(s) Oral at bedtime  dextrose Oral Gel 15 Gram(s) Oral once, Stop order after: 1 Doses PRN Blood Glucose LESS THAN 70 milliGRAM(s)/deciliter  glucagon  Injectable 1 milliGRAM(s) IntraMuscular once, Stop order after: 1 Doses  insulin glargine Injectable (LANTUS) 25 Unit(s) SubCutaneous at bedtime  insulin lispro (ADMELOG) corrective regimen sliding scale   SubCutaneous three times a day before meals  aspirin  chewable 81 milliGRAM(s) Oral daily  lidocaine   4% Patch 1 Patch Transdermal daily  ticagrelor 60 milliGRAM(s) Oral every 12 hours    LABS:	 	                          9.6    10.45 )-----------( 531      ( 13 Apr 2025 05:10 )             28.6     04-13    130[L]  |  93[L]  |  29.3[H]  ----------------------------<  108[H]  3.8   |  21.0[L]  |  1.98[H]    Ca    7.0[L]      13 Apr 2025 05:10  Mg     2.2     04-12      PT/INR/PTT ( 21 Mar 2025 04:48 )                       :                       :      X            :       61.4                  .        .                   .              .           .       X           .                                       Lipid Profile: Date: 03-22 @ 04:45  Total cholesterol 120; Direct LDL: --; HDL: 34; Triglycerides:154    TELEMETRY:   V paced.                                                                  [facility]                                                         CARDIOLOGY AT [facility]                                                                  [address] [place], [place]-[address] [place] [address]                                                         Telephone: [phone]. Fax:[phone]                                                                             PROGRESS NOTE    Reason for follow up:   NSTEMI/HFrEF  Update:   Lying in bed, with no new complaints, sleeping comfortable    -960ml/23hours  RLE with continued swelling.  no DVT - states she hurt her leg prior to admission.      Review of symptoms:   Cardiac:  No chest pain. No dyspnea. No palpitations.  Respiratory: no cough. No dyspnea  Gastrointestinal: No diarrhea. No abdominal pain. No bleeding.   Neuro: No focal neuro complaints.    Vitals:  T(C): 36.5 (04-13-25 @ 08:00), Max: 37 (04-12-25 @ 16:30)  HR: 66 (04-13-25 @ 08:00) (65 - 75)  BP: 126/67 (04-13-25 @ 08:00) (112/66 - 141/70)  RR: 17 (04-13-25 @ 08:00) (17 - 18)  SpO2: 98% (04-13-25 @ 08:00) (98% - 100%)  I&O's Summary  12 Apr 2025 07:01  -  13 Apr 2025 07:00  --------------------------------------------------------  IN: 240 mL / OUT: 1200 mL / NET: -960 mL  Weight (kg): 73.1 (04-11 @ 03:29)    PHYSICAL EXAM:  Appearance: Comfortable. No acute distress  HEENT:  Atraumatic. Normocephalic.  Normal oral mucosa  Neurologic: A & O x 3, no gross focal deficits.  Cardiovascular: RRR S1 S2, No murmur, no rubs/gallops. No JVD  Respiratory:  Diminished bilaterally  Gastrointestinal:  Soft, Non-tender, + BS  Lower Extremities: + Peripheral Pulses, No clubbing, cyanosis, + edema RLC.    Psychiatry: Patient is calm. No agitation.   Skin: warm and dry.    CURRENT CARDIAC MEDICATIONS:  buMETAnide Injectable 2 milliGRAM(s) IV Push <User Schedule>  hydrALAZINE 50 milliGRAM(s) Oral three times a day  isosorbide   mononitrate ER Tablet (IMDUR) 30 milliGRAM(s) Oral daily  metoprolol succinate ER 50 milliGRAM(s) Oral daily  sacubitril 24 mG/valsartan 26 mG 1 Tablet(s) Oral two times a day    CURRENT OTHER MEDICATIONS:  piperacillin/tazobactam IVPB.. 3.375 Gram(s) IV Intermittent every 8 hours  acetaminophen     Tablet .. 650 milliGRAM(s) Oral every 8 hours  gabapentin 400 milliGRAM(s) Oral daily  oxyCODONE    IR 5 milliGRAM(s) Oral every 6 hours PRN Severe Pain (7 - 10)  aluminum hydroxide/magnesium hydroxide/simethicone Suspension 30 milliLiter(s) Oral every 4 hours PRN Dyspepsia  atorvastatin 80 milliGRAM(s) Oral at bedtime  dextrose Oral Gel 15 Gram(s) Oral once, Stop order after: 1 Doses PRN Blood Glucose LESS THAN 70 milliGRAM(s)/deciliter  glucagon  Injectable 1 milliGRAM(s) IntraMuscular once, Stop order after: 1 Doses  insulin glargine Injectable (LANTUS) 25 Unit(s) SubCutaneous at bedtime  insulin lispro (ADMELOG) corrective regimen sliding scale   SubCutaneous three times a day before meals  aspirin  chewable 81 milliGRAM(s) Oral daily  lidocaine   4% Patch 1 Patch Transdermal daily  ticagrelor 60 milliGRAM(s) Oral every 12 hours    LABS:	 	                          9.6    10.45 )-----------( 531      ( 13 Apr 2025 05:10 )             28.6     04-13    130[L]  |  93[L]  |  29.3[H]  ----------------------------<  108[H]  3.8   |  21.0[L]  |  1.98[H]    Ca    7.0[L]      13 Apr 2025 05:10  Mg     2.2     04-12      PT/INR/PTT ( 21 Mar 2025 04:48 )                       :                       :      X            :       61.4                  .        .                   .              .           .       X           .                                       Lipid Profile: Date: 03-22 @ 04:45  Total cholesterol 120; Direct LDL: --; HDL: 34; Triglycerides:154    TELEMETRY:   V paced.

## 2025-04-13 NOTE — PROGRESS NOTE ADULT - SUBJECTIVE AND OBJECTIVE BOX
Patient seen and examined by orthopedic team early today for left knee pain and swelling. Aspiration performed, results as follows     Cell Count, Body Fluid (04.13.25 @ 17:30)   Tube Type: SCREWTOP  Fluid Type: Synovial fluid  Body Fluid Appearance: Bloody  Color - Body Fluid: Red  Total Nucleated Cell Count, Body Fluid: 3669: Reference Ranges:   Synovial Fluid   Total nucleated cells < 150 cells/uL   Neutrophils <25%   Lymphocytes 10-15%   Monocytes/Macrophages   Other parameters- No established reference ranges.   Bronchoalveolar lavage   Macrophages >85%   Lymphocytes 10-15%   Neutrophils <3%   Eosinophils <1%   Other parameters- No established reference ranges.   Peritoneal Fluid   WBC Count   RBC Count   Neutrophil %   Neutrophil # >/= 250 cells/uL critical   Other parameters- No established reference ranges.   Pleural/pericardial fluid/other body fluid types   No established reference ranges. cells/uL  WBC Count.: 3626: Differentials performed on hematopoietic cells only. cells/uL  Total RBC Count: 66783 cells/uL  Neutrophils-Body Fluid: 68 %  BF Lymphocytes: 32 %Crystals, Synovial Fluid (04.13.25 @ 17:30)   Synovial Crystals Clarity: Bloody  Synovial Crystals Tube: Sterile Screw Top  Synovial Crystals Color: Red  Synovial Crystals Id: None Seen    PLAN:  * Cell count 3669, not consistent with septic arthritis, negative crystals   * Rest, ice, nsaids   * WBAT   * Orthopedics to sign off, will follow PCR, gram stain and cultures remotely, reconsult if clinical status changes  * Case, plan, images discussed with Dr. Keny villegas attending

## 2025-04-14 LAB
ANION GAP SERPL CALC-SCNC: 13 MMOL/L — SIGNIFICANT CHANGE UP (ref 5–17)
BUN SERPL-MCNC: 29.2 MG/DL — HIGH (ref 8–20)
CALCIUM SERPL-MCNC: 6.8 MG/DL — LOW (ref 8.4–10.5)
CHLORIDE SERPL-SCNC: 97 MMOL/L — SIGNIFICANT CHANGE UP (ref 96–108)
CO2 SERPL-SCNC: 21 MMOL/L — LOW (ref 22–29)
CREAT SERPL-MCNC: 2.06 MG/DL — HIGH (ref 0.5–1.3)
EGFR: 25 ML/MIN/1.73M2 — LOW
EGFR: 25 ML/MIN/1.73M2 — LOW
GLUCOSE BLDC GLUCOMTR-MCNC: 133 MG/DL — HIGH (ref 70–99)
GLUCOSE BLDC GLUCOMTR-MCNC: 141 MG/DL — HIGH (ref 70–99)
GLUCOSE BLDC GLUCOMTR-MCNC: 155 MG/DL — HIGH (ref 70–99)
GLUCOSE BLDC GLUCOMTR-MCNC: 173 MG/DL — HIGH (ref 70–99)
GLUCOSE BLDC GLUCOMTR-MCNC: 189 MG/DL — HIGH (ref 70–99)
GLUCOSE SERPL-MCNC: 187 MG/DL — HIGH (ref 70–99)
GRAM STN FLD: SIGNIFICANT CHANGE UP
HCT VFR BLD CALC: 28 % — LOW (ref 34.5–45)
HGB BLD-MCNC: 9 G/DL — LOW (ref 11.5–15.5)
JOINT PATHOGENS PNL SNV NAA+NON-PROBE: SIGNIFICANT CHANGE UP
JOINT PCR SOURCE: SIGNIFICANT CHANGE UP
MCHC RBC-ENTMCNC: 28.3 PG — SIGNIFICANT CHANGE UP (ref 27–34)
MCHC RBC-ENTMCNC: 32.1 G/DL — SIGNIFICANT CHANGE UP (ref 32–36)
MCV RBC AUTO: 88.1 FL — SIGNIFICANT CHANGE UP (ref 80–100)
NRBC # BLD AUTO: 0 K/UL — SIGNIFICANT CHANGE UP (ref 0–0)
NRBC # FLD: 0 K/UL — SIGNIFICANT CHANGE UP (ref 0–0)
NRBC BLD AUTO-RTO: 0 /100 WBCS — SIGNIFICANT CHANGE UP (ref 0–0)
PLATELET # BLD AUTO: 506 K/UL — HIGH (ref 150–400)
PMV BLD: 9.3 FL — SIGNIFICANT CHANGE UP (ref 7–13)
POTASSIUM SERPL-MCNC: 4.3 MMOL/L — SIGNIFICANT CHANGE UP (ref 3.5–5.3)
POTASSIUM SERPL-SCNC: 4.3 MMOL/L — SIGNIFICANT CHANGE UP (ref 3.5–5.3)
RBC # BLD: 3.18 M/UL — LOW (ref 3.8–5.2)
RBC # FLD: 17.9 % — HIGH (ref 10.3–14.5)
SODIUM SERPL-SCNC: 131 MMOL/L — LOW (ref 135–145)
SPECIMEN SOURCE: SIGNIFICANT CHANGE UP
WBC # BLD: 7.48 K/UL — SIGNIFICANT CHANGE UP (ref 3.8–10.5)
WBC # FLD AUTO: 7.48 K/UL — SIGNIFICANT CHANGE UP (ref 3.8–10.5)

## 2025-04-14 PROCEDURE — 36000 PLACE NEEDLE IN VEIN: CPT

## 2025-04-14 PROCEDURE — 76942 ECHO GUIDE FOR BIOPSY: CPT | Mod: 26

## 2025-04-14 PROCEDURE — 99233 SBSQ HOSP IP/OBS HIGH 50: CPT

## 2025-04-14 PROCEDURE — 99232 SBSQ HOSP IP/OBS MODERATE 35: CPT

## 2025-04-14 RX ORDER — ACETAMINOPHEN 500 MG/5ML
650 LIQUID (ML) ORAL ONCE
Refills: 0 | Status: COMPLETED | OUTPATIENT
Start: 2025-04-14 | End: 2025-04-14

## 2025-04-14 RX ORDER — METHOCARBAMOL 500 MG/1
500 TABLET, FILM COATED ORAL
Refills: 0 | Status: DISCONTINUED | OUTPATIENT
Start: 2025-04-14 | End: 2025-05-09

## 2025-04-14 RX ORDER — BUMETANIDE 1 MG/1
2 TABLET ORAL
Refills: 0 | Status: DISCONTINUED | OUTPATIENT
Start: 2025-04-14 | End: 2025-04-15

## 2025-04-14 RX ORDER — LIDOCAINE HYDROCHLORIDE 20 MG/ML
1 JELLY TOPICAL EVERY 24 HOURS
Refills: 0 | Status: DISCONTINUED | OUTPATIENT
Start: 2025-04-14 | End: 2025-05-09

## 2025-04-14 RX ORDER — ACETAMINOPHEN 500 MG/5ML
975 LIQUID (ML) ORAL EVERY 8 HOURS
Refills: 0 | Status: DISCONTINUED | OUTPATIENT
Start: 2025-04-14 | End: 2025-05-09

## 2025-04-14 RX ADMIN — TICAGRELOR 60 MILLIGRAM(S): 90 TABLET ORAL at 05:07

## 2025-04-14 RX ADMIN — ATORVASTATIN CALCIUM 80 MILLIGRAM(S): 80 TABLET, FILM COATED ORAL at 21:35

## 2025-04-14 RX ADMIN — INSULIN GLARGINE-YFGN 25 UNIT(S): 100 INJECTION, SOLUTION SUBCUTANEOUS at 21:35

## 2025-04-14 RX ADMIN — Medication 50 MILLIGRAM(S): at 05:07

## 2025-04-14 RX ADMIN — Medication 975 MILLIGRAM(S): at 13:05

## 2025-04-14 RX ADMIN — LIDOCAINE HYDROCHLORIDE 1 PATCH: 20 JELLY TOPICAL at 13:06

## 2025-04-14 RX ADMIN — Medication 975 MILLIGRAM(S): at 14:08

## 2025-04-14 RX ADMIN — BUMETANIDE 2 MILLIGRAM(S): 1 TABLET ORAL at 05:07

## 2025-04-14 RX ADMIN — Medication 650 MILLIGRAM(S): at 05:07

## 2025-04-14 RX ADMIN — SACUBITRIL AND VALSARTAN 1 TABLET(S): 6; 6 PELLET ORAL at 05:07

## 2025-04-14 RX ADMIN — BUMETANIDE 2 MILLIGRAM(S): 1 TABLET ORAL at 17:35

## 2025-04-14 RX ADMIN — Medication 650 MILLIGRAM(S): at 09:52

## 2025-04-14 RX ADMIN — Medication 975 MILLIGRAM(S): at 21:35

## 2025-04-14 RX ADMIN — GABAPENTIN 400 MILLIGRAM(S): 400 CAPSULE ORAL at 13:06

## 2025-04-14 RX ADMIN — Medication 50 MILLIGRAM(S): at 21:35

## 2025-04-14 RX ADMIN — SACUBITRIL AND VALSARTAN 1 TABLET(S): 6; 6 PELLET ORAL at 17:35

## 2025-04-14 RX ADMIN — TICAGRELOR 60 MILLIGRAM(S): 90 TABLET ORAL at 17:35

## 2025-04-14 RX ADMIN — Medication 50 MILLIGRAM(S): at 13:06

## 2025-04-14 RX ADMIN — INSULIN LISPRO 2: 100 INJECTION, SOLUTION INTRAVENOUS; SUBCUTANEOUS at 17:36

## 2025-04-14 RX ADMIN — Medication 25 GRAM(S): at 22:03

## 2025-04-14 RX ADMIN — Medication 25 GRAM(S): at 13:05

## 2025-04-14 RX ADMIN — Medication 25 GRAM(S): at 05:07

## 2025-04-14 RX ADMIN — METOPROLOL SUCCINATE 50 MILLIGRAM(S): 50 TABLET, EXTENDED RELEASE ORAL at 05:07

## 2025-04-14 RX ADMIN — Medication 81 MILLIGRAM(S): at 13:06

## 2025-04-14 RX ADMIN — ISOSORBIDE MONONITRATE 30 MILLIGRAM(S): 60 TABLET, EXTENDED RELEASE ORAL at 13:05

## 2025-04-14 RX ADMIN — Medication 650 MILLIGRAM(S): at 10:40

## 2025-04-14 RX ADMIN — METHOCARBAMOL 500 MILLIGRAM(S): 500 TABLET, FILM COATED ORAL at 17:35

## 2025-04-14 NOTE — PROGRESS NOTE ADULT - ASSESSMENT
74yoF with a history of coronary artery disease, heart failure, chronic kidney disease, hypertension, diabetes, and anemia who was recently hospitalized for NSTEMI with PCI and bacteremia who presented with dyspnea on exertion and weight gain.    #Acute on chronic systolic heart failure  - Recent echocardiogram noted ejection fraction of 31% with multiple segmental abnormalities  - c/w IV bumex tid per HF  - Strict in and out  - Daily weights  - On metoprolol  - Heart Failure on board  - pt with reproducible chest wall pain, started on pain regimen    #Right knee pain  - knee xray showing effusion, possible in setting of fall  - pain regimen adjusted  - CT knee result noted  - ortho consulted, sp apiration, fluid neg for crystals/infections    #Fever  - CXR noted with ?Atelectasis vs edema vs infx  - on empiric Zosyn x 5 days  - swallow eval pending   - Bcx NGTD    #Coronary artery disease  - Continue on aspirin, ticagrelor, and atorvastatin  - Recent cardiac catheterization with PCI  - initial trop 140 -> 118  - pt with recurrent chest pain, high up in chest, trop downtrended   - given maalox as well     #Chronic kidney disease  - Renal function improved from prior values  - Continue to monitor while on diuresis    #Hyponatremia  - Hypervolemic on presentation  - Bumetanide for diuresis  - serial BMP    #Diabetes  - Insulin coverage  - Close monitoring of blood glucose levels  - Gabapentin for neuropathy    DVT ppx: ASA/Brilinta   Dispo: Pending HF optimization, swallow eval, and pain control   PT eval pending

## 2025-04-14 NOTE — SWALLOW BEDSIDE ASSESSMENT ADULT - SWALLOW EVAL: DIAGNOSIS
Arpita-pharyngeal swallow clinically assessed to be WFL for all consistencies trialed w/ no overt s/s of aspiration. Pt w/ c/o globus sensation which she reports is consistent w/ known reflux/GERD dx, pt stating she does not take her prescribed medication because it "makes her feel sick"

## 2025-04-14 NOTE — PROGRESS NOTE ADULT - PROBLEM SELECTOR PLAN 1
- ICM, LVEF 25-30% on 3/21 (previously 35-40%). Can repeat TTE once more optimized.   - Clinically appears close to euvolemia on exam however this is somewhat limited by body habitus. MEMS PAD elevated/unchanged. BP improving.  - Serum proBNP >68K  - Hyponatremia likely 2/2 hypervolemia - improving.  - Diuretics: Bumex 2 mg IVP BID.  - GDMT: C/w Entresto 24-26 mg BID, Imdur 30 mg daily, Hydralazine 50 mg TID, and Toprol-XL 50 mg daily. Can continue to uptitrate Entresto and wean off hydralazine as long as renal function is stable. Eventual MRA once renal function stabilizes. No Farxiga 2/2 h/o pyelonephritis.   - Device: s/p Micra PPM with chronic . Consider CRT in future?  - Low Na, 1.5L FR diet recommended  - Please document strict I&Os and daily standing weight.  - Will continue to monitor her CardioMEMS readings.

## 2025-04-14 NOTE — PROGRESS NOTE ADULT - ASSESSMENT
Gen Cards: Dr. Allen  HF: Dr. Polanco    75 y/o female with PMH of ICM/HFrEF (LVEF 35-40%), CAD s/p PCI, Mobitz II s/p PPM (MIRNA Dawn), HTN, HLD, PAD with chronic right LE wound, DM2, and CKD3, who was BIBA from CHF clinic due to hypervolemia, weight gain of ~20 lbs in the past 9 days despite escalation of PO diuretics.      She was recently admitted on 3/19/25 with c/o CP and found to have NSTEMI. She underwent LHC on 3/21 which showed mid-distal LAD severe stenosis, OM1 and OM2 severe stenosis, mid ISR 50%, distal ISR 99%, s/p PCI with 1 ZEINAB to RCA.  On 3/22 she had a fever with leukocytosis and her blood cultures were positive for E. Coli/ESBL. Her CT A/P showed left pyelonephritis. She also had an BRADY for which her Entresto was held. She was discharged home 3/31 and her CardioMEMS had been rising. She was advised to increase Torsemide 40 mg daily to twice daily on 4/4. Despite this, she has continued to gain weight and reports poor UOP response.     Admission labwork: Na 125, K 4.6, BUN 37.7, Cr 1.94, eGFR 27, Mg 2.3, proBNP 53440!  CXR: increased interstitial markings bilaterally, cardiomegaly.   On 4/10 she developed a fever, tmax 100.4F. RVP negative, empirically started on IV Zosyn.  4/13 s/p right knee fluid aspiration (s/p recent trauma/fall).    Prior Cardiac Testing:  TTE 3/21/25: LVEF 25-30%, grade II DD, normal RV size/function, severe LAE, moderate MR, mild TR, PASP 58 mmHg ( RAP 8 mmHg), no evidence of LV thrombus, multiple segmental abnormalities/regional WMA.    CardioMEMS PAD (goal 14?):  4/14: 33  4/11: 32  4/9: 32  3/27: 26  3/24: 23  3/21: 22

## 2025-04-14 NOTE — SWALLOW BEDSIDE ASSESSMENT ADULT - SLP GENERAL OBSERVATIONS
From: Mo De  To: Princess De  Sent: 2/1/2022 9:38 AM EST  Subject: Routine Exam    Janee Alber,        We wanted to reach out and see if you had a mammogram done recently  If you have had a mammogram in the past two years, please let us know so that we can get a copy for your medical record  If you have not had one done our office can provide you with an order along with assistance in scheduling your exam     If you would like an order for testing or if you have questions, please feel free to reply to this message through [employer] or call the office at [phone]  And if you would like us to schedule your mammogram for you, please let us know what location you prefer and what time of day/day of the week is best for you  Thank you!   [facility], [place]  Pt recd a&ox3, upright in bed, tolerating RA, NAD, 0/10 pain scale pre/post, pt/family declining formal [place] interpretation and providing as needed t/o

## 2025-04-14 NOTE — SWALLOW BEDSIDE ASSESSMENT ADULT - SWALLOW EVAL: RECOMMENDED FEEDING/EATING TECHNIQUES
extra sauces/gravies/allow for swallow between intakes/alternate food with liquid/maintain upright posture during/after eating for 30 mins/oral hygiene/small sips/bites

## 2025-04-14 NOTE — PROGRESS NOTE ADULT - SUBJECTIVE AND OBJECTIVE BOX
Laura Sampson M.D.    Patient is a 74y old  Female who presents with a chief complaint of CHF (09 Apr 2025 13:11)      SUBJECTIVE / OVERNIGHT EVENTS: no event overnight. Reports R knee pain better. Still with chest wall pain, worse with movement as well as palpation.     Patient denies chest pain, SOB, abd pain, N/V, fever, chills, dysuria or any other complaints. All remainder ROS negative.     MEDICATIONS  (STANDING):  acetaminophen     Tablet .. 975 milliGRAM(s) Oral every 8 hours  aspirin  chewable 81 milliGRAM(s) Oral daily  atorvastatin 80 milliGRAM(s) Oral at bedtime  dextrose 5%. 1000 milliLiter(s) (50 mL/Hr) IV Continuous <Continuous>  dextrose 5%. 1000 milliLiter(s) (100 mL/Hr) IV Continuous <Continuous>  dextrose 50% Injectable 25 Gram(s) IV Push once  dextrose 50% Injectable 12.5 Gram(s) IV Push once  dextrose 50% Injectable 25 Gram(s) IV Push once  gabapentin 400 milliGRAM(s) Oral daily  glucagon  Injectable 1 milliGRAM(s) IntraMuscular once  hydrALAZINE 50 milliGRAM(s) Oral three times a day  insulin glargine Injectable (LANTUS) 25 Unit(s) SubCutaneous at bedtime  insulin lispro (ADMELOG) corrective regimen sliding scale   SubCutaneous three times a day before meals  isosorbide   mononitrate ER Tablet (IMDUR) 30 milliGRAM(s) Oral daily  lidocaine   4% Patch 1 Patch Transdermal every 24 hours  methocarbamol 500 milliGRAM(s) Oral two times a day  metoprolol succinate ER 50 milliGRAM(s) Oral daily  piperacillin/tazobactam IVPB.. 3.375 Gram(s) IV Intermittent every 8 hours  sacubitril 24 mG/valsartan 26 mG 1 Tablet(s) Oral two times a day  ticagrelor 60 milliGRAM(s) Oral every 12 hours    MEDICATIONS  (PRN):  aluminum hydroxide/magnesium hydroxide/simethicone Suspension 30 milliLiter(s) Oral every 4 hours PRN Dyspepsia  dextrose Oral Gel 15 Gram(s) Oral once PRN Blood Glucose LESS THAN 70 milliGRAM(s)/deciliter  oxyCODONE    IR 5 milliGRAM(s) Oral every 6 hours PRN Severe Pain (7 - 10)      I&O's Summary    13 Apr 2025 07:01  -  14 Apr 2025 07:00  --------------------------------------------------------  IN: 1080 mL / OUT: 1900 mL / NET: -820 mL        PHYSICAL EXAM:  Vital Signs Last 24 Hrs  T(C): 36.9 (14 Apr 2025 08:41), Max: 37 (14 Apr 2025 00:10)  T(F): 98.4 (14 Apr 2025 08:41), Max: 98.6 (14 Apr 2025 00:10)  HR: 61 (14 Apr 2025 08:41) (61 - 74)  BP: 109/63 (14 Apr 2025 08:41) (109/63 - 144/67)  BP(mean): --  RR: 18 (14 Apr 2025 08:41) (17 - 18)  SpO2: 97% (14 Apr 2025 08:41) (97% - 98%)    Parameters below as of 14 Apr 2025 08:41  Patient On (Oxygen Delivery Method): room air    CONSTITUTIONAL: NAD, on NC  RESPIRATORY: Normal respiratory effort; lungs are clear to auscultation bilaterally  CARDIOVASCULAR: Regular rate and rhythm, on LE edema. Reproducible CP  ABDOMEN: Nontender to palpation, normoactive bowel soundss  MSK: R knee improved mobility, wrapped in ace bandages    LABS:                        9.0    7.48  )-----------( 506      ( 14 Apr 2025 06:20 )             28.0     04-14    131[L]  |  97  |  29.2[H]  ----------------------------<  187[H]  4.3   |  21.0[L]  |  2.06[H]    Ca    6.8[L]      14 Apr 2025 06:20            Urinalysis Basic - ( 14 Apr 2025 06:20 )    Color: x / Appearance: x / SG: x / pH: x  Gluc: 187 mg/dL / Ketone: x  / Bili: x / Urobili: x   Blood: x / Protein: x / Nitrite: x   Leuk Esterase: x / RBC: x / WBC x   Sq Epi: x / Non Sq Epi: x / Bacteria: x        Culture - Joint (collected 13 Apr 2025 17:30)  Source: Knee Knee  Gram Stain (14 Apr 2025 03:31):    polymorphonuclear leukocytes seen    No organisms seen    by cytocentrifuge      CAPILLARY BLOOD GLUCOSE      POCT Blood Glucose.: 133 mg/dL (14 Apr 2025 08:33)  POCT Blood Glucose.: 189 mg/dL (13 Apr 2025 21:14)  POCT Blood Glucose.: 112 mg/dL (13 Apr 2025 16:59)  POCT Blood Glucose.: 75 mg/dL (13 Apr 2025 12:33)      RADIOLOGY & ADDITIONAL TESTS:  Results Reviewed:   Imaging Personally Reviewed:  Electrocardiogram Personally Reviewed:

## 2025-04-14 NOTE — SWALLOW BEDSIDE ASSESSMENT ADULT - SLP PERTINENT HISTORY OF CURRENT PROBLEM
As per MD note: "74yoF with a history of coronary artery disease, heart failure, chronic kidney disease, hypertension, diabetes, and anemia who was recently hospitalized for NSTEMI with PCI and bacteremia who presented with dyspnea on exertion and weight gain."

## 2025-04-14 NOTE — SWALLOW BEDSIDE ASSESSMENT ADULT - PHARYNGEAL PHASE
Within functional limits reported relief of pharyngeal stasis w/ liquid wash/Complaints of pharyngeal stasis/Within functional limits

## 2025-04-14 NOTE — PROGRESS NOTE ADULT - SUBJECTIVE AND OBJECTIVE BOX
[facility] Advanced Heart Failure - Progress Note  [address] [place] [address]  Office Phone: [phone]/Fax: [phone]  Service/On Call Phone [phone]    Subjective/Objective: No acute events. S/p right knee aspiration yesterday. Endorses tenderness around knee. Denies overt HF symptoms.     Medications:  acetaminophen     Tablet .. 975 milliGRAM(s) Oral every 8 hours  aluminum hydroxide/magnesium hydroxide/simethicone Suspension 30 milliLiter(s) Oral every 4 hours PRN  aspirin  chewable 81 milliGRAM(s) Oral daily  atorvastatin 80 milliGRAM(s) Oral at bedtime  dextrose 5%. 1000 milliLiter(s) IV Continuous <Continuous>  dextrose 5%. 1000 milliLiter(s) IV Continuous <Continuous>  dextrose 50% Injectable 25 Gram(s) IV Push once  dextrose 50% Injectable 12.5 Gram(s) IV Push once  dextrose 50% Injectable 25 Gram(s) IV Push once  dextrose Oral Gel 15 Gram(s) Oral once PRN  gabapentin 400 milliGRAM(s) Oral daily  glucagon  Injectable 1 milliGRAM(s) IntraMuscular once  hydrALAZINE 50 milliGRAM(s) Oral three times a day  insulin glargine Injectable (LANTUS) 25 Unit(s) SubCutaneous at bedtime  insulin lispro (ADMELOG) corrective regimen sliding scale   SubCutaneous three times a day before meals  isosorbide   mononitrate ER Tablet (IMDUR) 30 milliGRAM(s) Oral daily  lidocaine   4% Patch 1 Patch Transdermal every 24 hours  methocarbamol 500 milliGRAM(s) Oral two times a day  metoprolol succinate ER 50 milliGRAM(s) Oral daily  oxyCODONE    IR 5 milliGRAM(s) Oral every 6 hours PRN  piperacillin/tazobactam IVPB.. 3.375 Gram(s) IV Intermittent every 8 hours  sacubitril 24 mG/valsartan 26 mG 1 Tablet(s) Oral two times a day  ticagrelor 60 milliGRAM(s) Oral every 12 hours    Vital Signs Last 24 Hours  T(C): 36.9 (25 @ 12:52), Max: 37 (25 @ 00:10)  HR: 68 (25 @ 12:52) (61 - 74)  BP: 115/60 (25 @ 12:52) (109/63 - 144/67)  RR: 19 (25 @ 12:52) (17 - 19)  SpO2: 97% (25 @ 12:52) (97% - 98%)    Weight in k.8 ( @ 10:28)    I&O's Summary  2025 07:01  -  2025 07:00  --------------------------------------------------------  IN: 1080 mL / OUT: 1900 mL / NET: -820 mL    Tele:     Physical Exam:  General: In no distress. Able to lay flat.  Neck: Neck supple. JVP not significantly elevated.   Chest: Clear to auscultation bilaterally.  CV: RRR. Normal S1 and S2. No murmurs, rub, or gallops. Lukewarm peripherally.  PV: No LE edema noted. Pulses full/equal in all four extremities.  Abdomen: Soft, non-distended, non-tender.  Skin: warm, dry, no rash.  Neurology: Alert and oriented times three. Sensation intact.  Psych: Appropriate affect.    Labs:                        9.0    7.48  )-----------( 506      ( 2025 06:20 )             28.0         131[L]  |  97  |  29.2[H]  ----------------------------<  187[H]  4.3   |  21.0[L]  |  2.06[H]    Ca    6.8[L]      2025 06:20

## 2025-04-15 LAB
ANION GAP SERPL CALC-SCNC: 17 MMOL/L — SIGNIFICANT CHANGE UP (ref 5–17)
BUN SERPL-MCNC: 29.8 MG/DL — HIGH (ref 8–20)
CALCIUM SERPL-MCNC: 6.8 MG/DL — LOW (ref 8.4–10.5)
CHLORIDE SERPL-SCNC: 95 MMOL/L — LOW (ref 96–108)
CO2 SERPL-SCNC: 20 MMOL/L — LOW (ref 22–29)
CREAT SERPL-MCNC: 2.11 MG/DL — HIGH (ref 0.5–1.3)
EGFR: 24 ML/MIN/1.73M2 — LOW
EGFR: 24 ML/MIN/1.73M2 — LOW
GLUCOSE BLDC GLUCOMTR-MCNC: 69 MG/DL — LOW (ref 70–99)
GLUCOSE BLDC GLUCOMTR-MCNC: 84 MG/DL — SIGNIFICANT CHANGE UP (ref 70–99)
GLUCOSE BLDC GLUCOMTR-MCNC: 88 MG/DL — SIGNIFICANT CHANGE UP (ref 70–99)
GLUCOSE BLDC GLUCOMTR-MCNC: 88 MG/DL — SIGNIFICANT CHANGE UP (ref 70–99)
GLUCOSE BLDC GLUCOMTR-MCNC: 98 MG/DL — SIGNIFICANT CHANGE UP (ref 70–99)
GLUCOSE SERPL-MCNC: 57 MG/DL — LOW (ref 70–99)
HCT VFR BLD CALC: 28.7 % — LOW (ref 34.5–45)
HGB BLD-MCNC: 9.2 G/DL — LOW (ref 11.5–15.5)
MCHC RBC-ENTMCNC: 28.8 PG — SIGNIFICANT CHANGE UP (ref 27–34)
MCHC RBC-ENTMCNC: 32.1 G/DL — SIGNIFICANT CHANGE UP (ref 32–36)
MCV RBC AUTO: 90 FL — SIGNIFICANT CHANGE UP (ref 80–100)
NRBC # BLD AUTO: 0 K/UL — SIGNIFICANT CHANGE UP (ref 0–0)
NRBC # FLD: 0 K/UL — SIGNIFICANT CHANGE UP (ref 0–0)
NRBC BLD AUTO-RTO: 0 /100 WBCS — SIGNIFICANT CHANGE UP (ref 0–0)
PLATELET # BLD AUTO: 549 K/UL — HIGH (ref 150–400)
PMV BLD: 9.2 FL — SIGNIFICANT CHANGE UP (ref 7–13)
POTASSIUM SERPL-MCNC: 3.8 MMOL/L — SIGNIFICANT CHANGE UP (ref 3.5–5.3)
POTASSIUM SERPL-SCNC: 3.8 MMOL/L — SIGNIFICANT CHANGE UP (ref 3.5–5.3)
RBC # BLD: 3.19 M/UL — LOW (ref 3.8–5.2)
RBC # FLD: 17.9 % — HIGH (ref 10.3–14.5)
SODIUM SERPL-SCNC: 132 MMOL/L — LOW (ref 135–145)
WBC # BLD: 9.67 K/UL — SIGNIFICANT CHANGE UP (ref 3.8–10.5)
WBC # FLD AUTO: 9.67 K/UL — SIGNIFICANT CHANGE UP (ref 3.8–10.5)

## 2025-04-15 PROCEDURE — 99232 SBSQ HOSP IP/OBS MODERATE 35: CPT

## 2025-04-15 PROCEDURE — 99233 SBSQ HOSP IP/OBS HIGH 50: CPT

## 2025-04-15 RX ORDER — BUMETANIDE 1 MG/1
1 TABLET ORAL
Qty: 20 | Refills: 0 | Status: DISCONTINUED | OUTPATIENT
Start: 2025-04-15 | End: 2025-04-20

## 2025-04-15 RX ORDER — SERTRALINE 100 MG/1
25 TABLET, FILM COATED ORAL DAILY
Refills: 0 | Status: DISCONTINUED | OUTPATIENT
Start: 2025-04-15 | End: 2025-04-25

## 2025-04-15 RX ADMIN — Medication 975 MILLIGRAM(S): at 13:23

## 2025-04-15 RX ADMIN — INSULIN GLARGINE-YFGN 25 UNIT(S): 100 INJECTION, SOLUTION SUBCUTANEOUS at 22:54

## 2025-04-15 RX ADMIN — Medication 81 MILLIGRAM(S): at 10:52

## 2025-04-15 RX ADMIN — Medication 50 MILLIGRAM(S): at 13:14

## 2025-04-15 RX ADMIN — TICAGRELOR 60 MILLIGRAM(S): 90 TABLET ORAL at 05:23

## 2025-04-15 RX ADMIN — SERTRALINE 25 MILLIGRAM(S): 100 TABLET, FILM COATED ORAL at 10:52

## 2025-04-15 RX ADMIN — OXYCODONE HYDROCHLORIDE 5 MILLIGRAM(S): 30 TABLET ORAL at 17:24

## 2025-04-15 RX ADMIN — Medication 975 MILLIGRAM(S): at 05:23

## 2025-04-15 RX ADMIN — BUMETANIDE 5 MG/HR: 1 TABLET ORAL at 11:12

## 2025-04-15 RX ADMIN — TICAGRELOR 60 MILLIGRAM(S): 90 TABLET ORAL at 17:25

## 2025-04-15 RX ADMIN — GABAPENTIN 400 MILLIGRAM(S): 400 CAPSULE ORAL at 10:52

## 2025-04-15 RX ADMIN — METOPROLOL SUCCINATE 50 MILLIGRAM(S): 50 TABLET, EXTENDED RELEASE ORAL at 05:24

## 2025-04-15 RX ADMIN — LIDOCAINE HYDROCHLORIDE 1 PATCH: 20 JELLY TOPICAL at 10:53

## 2025-04-15 RX ADMIN — BUMETANIDE 2 MILLIGRAM(S): 1 TABLET ORAL at 05:24

## 2025-04-15 RX ADMIN — Medication 25 GRAM(S): at 21:28

## 2025-04-15 RX ADMIN — Medication 25 GRAM(S): at 05:23

## 2025-04-15 RX ADMIN — OXYCODONE HYDROCHLORIDE 5 MILLIGRAM(S): 30 TABLET ORAL at 18:46

## 2025-04-15 RX ADMIN — METHOCARBAMOL 500 MILLIGRAM(S): 500 TABLET, FILM COATED ORAL at 05:24

## 2025-04-15 RX ADMIN — Medication 30 MILLILITER(S): at 16:18

## 2025-04-15 RX ADMIN — Medication 25 GRAM(S): at 13:14

## 2025-04-15 RX ADMIN — Medication 50 MILLIGRAM(S): at 21:27

## 2025-04-15 RX ADMIN — ATORVASTATIN CALCIUM 80 MILLIGRAM(S): 80 TABLET, FILM COATED ORAL at 21:27

## 2025-04-15 RX ADMIN — Medication 30 MILLILITER(S): at 10:53

## 2025-04-15 RX ADMIN — METHOCARBAMOL 500 MILLIGRAM(S): 500 TABLET, FILM COATED ORAL at 17:25

## 2025-04-15 RX ADMIN — Medication 50 MILLIGRAM(S): at 05:24

## 2025-04-15 RX ADMIN — Medication 975 MILLIGRAM(S): at 21:27

## 2025-04-15 RX ADMIN — Medication 975 MILLIGRAM(S): at 13:14

## 2025-04-15 RX ADMIN — ISOSORBIDE MONONITRATE 30 MILLIGRAM(S): 60 TABLET, EXTENDED RELEASE ORAL at 10:52

## 2025-04-15 RX ADMIN — SACUBITRIL AND VALSARTAN 1 TABLET(S): 6; 6 PELLET ORAL at 17:24

## 2025-04-15 RX ADMIN — SACUBITRIL AND VALSARTAN 1 TABLET(S): 6; 6 PELLET ORAL at 05:24

## 2025-04-15 NOTE — PROGRESS NOTE ADULT - SUBJECTIVE AND OBJECTIVE BOX
Laura Sampson M.D.    Patient is a 74y old  Female who presents with a chief complaint of Weight gain, poor response to oral diuretics (15 Apr 2025 11:03)      SUBJECTIVE / OVERNIGHT EVENTS: reports pain better. Able to bend knee now. Breathing okay.     Patient denies chest pain, SOB, abd pain, N/V, fever, chills, dysuria or any other complaints. All remainder ROS negative.     MEDICATIONS  (STANDING):  acetaminophen     Tablet .. 975 milliGRAM(s) Oral every 8 hours  aspirin  chewable 81 milliGRAM(s) Oral daily  atorvastatin 80 milliGRAM(s) Oral at bedtime  buMETAnide Infusion 1 mG/Hr (5 mL/Hr) IV Continuous <Continuous>  dextrose 5%. 1000 milliLiter(s) (100 mL/Hr) IV Continuous <Continuous>  dextrose 5%. 1000 milliLiter(s) (50 mL/Hr) IV Continuous <Continuous>  dextrose 50% Injectable 25 Gram(s) IV Push once  dextrose 50% Injectable 12.5 Gram(s) IV Push once  dextrose 50% Injectable 25 Gram(s) IV Push once  gabapentin 400 milliGRAM(s) Oral daily  glucagon  Injectable 1 milliGRAM(s) IntraMuscular once  hydrALAZINE 50 milliGRAM(s) Oral three times a day  insulin glargine Injectable (LANTUS) 25 Unit(s) SubCutaneous at bedtime  insulin lispro (ADMELOG) corrective regimen sliding scale   SubCutaneous three times a day before meals  isosorbide   mononitrate ER Tablet (IMDUR) 30 milliGRAM(s) Oral daily  lidocaine   4% Patch 1 Patch Transdermal every 24 hours  methocarbamol 500 milliGRAM(s) Oral two times a day  metoprolol succinate ER 50 milliGRAM(s) Oral daily  piperacillin/tazobactam IVPB.. 3.375 Gram(s) IV Intermittent every 8 hours  sacubitril 24 mG/valsartan 26 mG 1 Tablet(s) Oral two times a day  sertraline 25 milliGRAM(s) Oral daily  ticagrelor 60 milliGRAM(s) Oral every 12 hours    MEDICATIONS  (PRN):  aluminum hydroxide/magnesium hydroxide/simethicone Suspension 30 milliLiter(s) Oral every 4 hours PRN Dyspepsia  dextrose Oral Gel 15 Gram(s) Oral once PRN Blood Glucose LESS THAN 70 milliGRAM(s)/deciliter  oxyCODONE    IR 5 milliGRAM(s) Oral every 6 hours PRN Severe Pain (7 - 10)      I&O's Summary    14 Apr 2025 07:01  -  15 Apr 2025 07:00  --------------------------------------------------------  IN: 655 mL / OUT: 400 mL / NET: 255 mL        PHYSICAL EXAM:  Vital Signs Last 24 Hrs  T(C): 36.9 (15 Apr 2025 08:23), Max: 36.9 (14 Apr 2025 12:52)  T(F): 98.4 (15 Apr 2025 08:23), Max: 98.5 (14 Apr 2025 12:52)  HR: 69 (15 Apr 2025 08:23) (65 - 69)  BP: 112/69 (15 Apr 2025 08:23) (104/56 - 138/64)  BP(mean): --  RR: 18 (15 Apr 2025 08:23) (17 - 19)  SpO2: 97% (15 Apr 2025 08:23) (97% - 99%)    Parameters below as of 15 Apr 2025 08:23  Patient On (Oxygen Delivery Method): room air      CONSTITUTIONAL: NAD, on NC  RESPIRATORY: Normal respiratory effort; lungs are clear to auscultation bilaterally  CARDIOVASCULAR: Regular rate and rhythm, +LE edema  ABDOMEN: Nontender to palpation, normoactive bowel sounds  MSK: R knee improved mobility, wrapped in ace bandages    LABS:                        9.2    9.67  )-----------( 549      ( 15 Apr 2025 07:10 )             28.7     04-15    132[L]  |  95[L]  |  29.8[H]  ----------------------------<  57[L]  3.8   |  20.0[L]  |  2.11[H]    Ca    6.8[L]      15 Apr 2025 07:10            Urinalysis Basic - ( 15 Apr 2025 07:10 )    Color: x / Appearance: x / SG: x / pH: x  Gluc: 57 mg/dL / Ketone: x  / Bili: x / Urobili: x   Blood: x / Protein: x / Nitrite: x   Leuk Esterase: x / RBC: x / WBC x   Sq Epi: x / Non Sq Epi: x / Bacteria: x        Culture - Joint (collected 13 Apr 2025 17:30)  Source: Knee Knee  Gram Stain (14 Apr 2025 03:31):    polymorphonuclear leukocytes seen    No organisms seen    by cytocentrifuge  Preliminary Report (14 Apr 2025 18:56):    No growth to date.      CAPILLARY BLOOD GLUCOSE      POCT Blood Glucose.: 69 mg/dL (15 Apr 2025 07:49)  POCT Blood Glucose.: 173 mg/dL (14 Apr 2025 21:04)  POCT Blood Glucose.: 155 mg/dL (14 Apr 2025 17:14)  POCT Blood Glucose.: 141 mg/dL (14 Apr 2025 12:18)      RADIOLOGY & ADDITIONAL TESTS:  Results Reviewed:   Imaging Personally Reviewed:  Electrocardiogram Personally Reviewed:

## 2025-04-15 NOTE — PROGRESS NOTE ADULT - PROBLEM SELECTOR PLAN 1
- ICM, LVEF 25-30% on 3/21 (previously 35-40%). Can repeat TTE once more optimized.   - Clinically appears close to euvolemia on exam however this is somewhat limited by body habitus. MEMS PAD elevated/unchanged. BP improving.  - Serum proBNP >68K  - Hyponatremia likely 2/2 hypervolemia - improving.  - Diuretics: Start continuous Bumex infusion at 1mg/hr. Please check BMP BID. Maintain K>4, Mg >2.  - GDMT: C/w Entresto 24-26 mg BID, Imdur 30 mg daily, Hydralazine 50 mg TID, and Toprol-XL 50 mg daily. Will plan  to uptitrate Entresto and wean off hydralazine as long as renal function is stable. Eventual MRA once renal function stabilizes. No Farxiga 2/2 h/o pyelonephritis.   - Device: s/p Micra PPM with chronic . Consider CRT in future?  - Low Na, 1.5L FR diet recommended  - Please document strict I&Os and daily standing weight.  - May consider repeat RHC/CardioMEMS recalibration in next 24-48 hrs dependent on response to Bumex gtt.

## 2025-04-15 NOTE — PROGRESS NOTE ADULT - ASSESSMENT
74yoF with a history of coronary artery disease, heart failure, chronic kidney disease, hypertension, diabetes, and anemia who was recently hospitalized for NSTEMI with PCI and bacteremia who presented with dyspnea on exertion and weight gain.    #Acute on chronic systolic heart failure  - Recent echocardiogram noted ejection fraction of 31% with multiple segmental abnormalities  - c/w IV bumex per HF  - Strict in and out  - Daily weights  - On metoprolol  - Heart Failure on board  - pt with reproducible chest wall pain, started on pain regimen    #Right knee pain  - knee xray showing effusion, possible in setting of fall  - pain regimen adjusted  - CT knee result noted  - ortho consulted, sp apiration, fluid neg for crystals/infections    #Fever  - CXR noted with ?Atelectasis vs edema vs infx  - on empiric Zosyn x 5 days  - swallow eval result noted, diet advanced   - Bcx NGTD    #Coronary artery disease  - Continue on aspirin, ticagrelor, and atorvastatin  - Recent cardiac catheterization with PCI  - initial trop 140 -> 118  - pt with recurrent chest pain, high up in chest, trop downtrended   - given maalox as well     #Chronic kidney disease  - Renal function improved from prior values  - Continue to monitor while on diuresis    #Hyponatremia  - Hypervolemic on presentation  - Bumetanide for diuresis  - serial BMP    #Diabetes  - Insulin coverage  - Close monitoring of blood glucose levels  - Gabapentin for neuropathy  - need Glucometer on discharge     DVT ppx: ASA/Brilinta   Dispo: Pending HF optimization, and PT eval   PT eval pending

## 2025-04-15 NOTE — PROGRESS NOTE ADULT - ASSESSMENT
Gen Cards: Dr. Allen  HF: Dr. Polanco    73 y/o female with PMH of ICM/HFrEF (LVEF 35-40%), CAD s/p PCI, Mobitz II s/p PPM (MIRNA Dawn), HTN, HLD, PAD with chronic right LE wound, DM2, and CKD3, who was BIBA from CHF clinic due to hypervolemia, weight gain of ~20 lbs in the past 9 days despite escalation of PO diuretics.      She was recently admitted on 3/19/25 with c/o CP and found to have NSTEMI. She underwent LHC on 3/21 which showed mid-distal LAD severe stenosis, OM1 and OM2 severe stenosis, mid ISR 50%, distal ISR 99%, s/p PCI with 1 ZEINAB to RCA.  On 3/22 she had a fever with leukocytosis and her blood cultures were positive for E. Coli/ESBL. Her CT A/P showed left pyelonephritis. She also had an BRADY for which her Entresto was held. She was discharged home 3/31 and her CardioMEMS had been rising. She was advised to increase Torsemide 40 mg daily to twice daily on 4/4. Despite this, she has continued to gain weight and reports poor UOP response.     Admission labwork: Na 125, K 4.6, BUN 37.7, Cr 1.94, eGFR 27, Mg 2.3, proBNP 27708!  CXR: increased interstitial markings bilaterally, cardiomegaly.   On 4/10 she developed a fever, tmax 100.4F. RVP negative, empirically started on IV Zosyn.  4/13 s/p right knee fluid aspiration (s/p recent trauma/fall).    Prior Cardiac Testing:  TTE 3/21/25: LVEF 25-30%, grade II DD, normal RV size/function, severe LAE, moderate MR, mild TR, PASP 58 mmHg ( RAP 8 mmHg), no evidence of LV thrombus, multiple segmental abnormalities/regional WMA.    CardioMEMS PAD (goal 14?):  4/14: 33  4/11: 32  4/9: 32  3/27: 26  3/24: 23  3/21: 22

## 2025-04-15 NOTE — PROGRESS NOTE ADULT - SUBJECTIVE AND OBJECTIVE BOX
[facility] Advanced Heart Failure - Progress Note  [address] [place] [address]  Office Phone: [phone]/Fax: [phone]  Service/On Call Phone [phone]    Subjective/Objective: No overnight events. Pt. denies overt HF symptoms however she has not been ambulating. Able to lay flat.     Medications:  acetaminophen     Tablet .. 975 milliGRAM(s) Oral every 8 hours  aluminum hydroxide/magnesium hydroxide/simethicone Suspension 30 milliLiter(s) Oral every 4 hours PRN  aspirin  chewable 81 milliGRAM(s) Oral daily  atorvastatin 80 milliGRAM(s) Oral at bedtime  buMETAnide Infusion 1 mG/Hr IV Continuous <Continuous>  dextrose 5%. 1000 milliLiter(s) IV Continuous <Continuous>  dextrose 5%. 1000 milliLiter(s) IV Continuous <Continuous>  dextrose 50% Injectable 25 Gram(s) IV Push once  dextrose 50% Injectable 12.5 Gram(s) IV Push once  dextrose 50% Injectable 25 Gram(s) IV Push once  dextrose Oral Gel 15 Gram(s) Oral once PRN  gabapentin 400 milliGRAM(s) Oral daily  glucagon  Injectable 1 milliGRAM(s) IntraMuscular once  hydrALAZINE 50 milliGRAM(s) Oral three times a day  insulin glargine Injectable (LANTUS) 25 Unit(s) SubCutaneous at bedtime  insulin lispro (ADMELOG) corrective regimen sliding scale   SubCutaneous three times a day before meals  isosorbide   mononitrate ER Tablet (IMDUR) 30 milliGRAM(s) Oral daily  lidocaine   4% Patch 1 Patch Transdermal every 24 hours  methocarbamol 500 milliGRAM(s) Oral two times a day  metoprolol succinate ER 50 milliGRAM(s) Oral daily  oxyCODONE    IR 5 milliGRAM(s) Oral every 6 hours PRN  piperacillin/tazobactam IVPB.. 3.375 Gram(s) IV Intermittent every 8 hours  sacubitril 24 mG/valsartan 26 mG 1 Tablet(s) Oral two times a day  sertraline 25 milliGRAM(s) Oral daily  ticagrelor 60 milliGRAM(s) Oral every 12 hours    Vital Signs Last 24 Hours  T(C): 36.9 (04-15-25 @ 08:23), Max: 36.9 (25 @ 12:52)  HR: 69 (04-15-25 @ 08:23) (65 - 69)  BP: 112/69 (04-15-25 @ 08:23) (104/56 - 138/64)  RR: 18 (04-15-25 @ 08:23) (17 - 19)  SpO2: 97% (04-15-25 @ 08:23) (97% - 99%)    Weight in k.8 (04-15 @ 10:46)    I&O's Summary    2025 07:01  -  15 Apr 2025 07:00  --------------------------------------------------------  IN: 655 mL / OUT: 400 mL / NET: 255 mL    Tele:     Physical Exam:  General: In no distress.   Neck: Neck supple. JVP not elevated.   Chest: Clear to auscultation bilaterally.  CV: RRR. Normal S1 and S2. No murmurs, rub, or gallops. Lukewarm peripherally.  PV: No LE edema noted.  Abdomen: Soft, non-distended, non-tender.  Skin: dry, no rash.  Neurology: Alert and oriented times three. Sensation intact.  Psych: Appropriate affect.    Labs:                        9.2    9.67  )-----------( 549      ( 15 Apr 2025 07:10 )             28.7     04-15    132[L]  |  95[L]  |  29.8[H]  ----------------------------<  57[L]  3.8   |  20.0[L]  |  2.11[H]    Ca    6.8[L]      15 Apr 2025 07:10

## 2025-04-16 LAB
ANION GAP SERPL CALC-SCNC: 14 MMOL/L — SIGNIFICANT CHANGE UP (ref 5–17)
BUN SERPL-MCNC: 27.6 MG/DL — HIGH (ref 8–20)
CALCIUM SERPL-MCNC: 7.1 MG/DL — LOW (ref 8.4–10.5)
CHLORIDE SERPL-SCNC: 97 MMOL/L — SIGNIFICANT CHANGE UP (ref 96–108)
CO2 SERPL-SCNC: 22 MMOL/L — SIGNIFICANT CHANGE UP (ref 22–29)
CREAT SERPL-MCNC: 1.91 MG/DL — HIGH (ref 0.5–1.3)
CULTURE RESULTS: SIGNIFICANT CHANGE UP
CULTURE RESULTS: SIGNIFICANT CHANGE UP
EGFR: 28 ML/MIN/1.73M2 — LOW
EGFR: 28 ML/MIN/1.73M2 — LOW
GLUCOSE BLDC GLUCOMTR-MCNC: 107 MG/DL — HIGH (ref 70–99)
GLUCOSE BLDC GLUCOMTR-MCNC: 112 MG/DL — HIGH (ref 70–99)
GLUCOSE BLDC GLUCOMTR-MCNC: 128 MG/DL — HIGH (ref 70–99)
GLUCOSE BLDC GLUCOMTR-MCNC: 176 MG/DL — HIGH (ref 70–99)
GLUCOSE BLDC GLUCOMTR-MCNC: 48 MG/DL — CRITICAL LOW (ref 70–99)
GLUCOSE BLDC GLUCOMTR-MCNC: 50 MG/DL — CRITICAL LOW (ref 70–99)
GLUCOSE BLDC GLUCOMTR-MCNC: 59 MG/DL — LOW (ref 70–99)
GLUCOSE SERPL-MCNC: 83 MG/DL — SIGNIFICANT CHANGE UP (ref 70–99)
HCT VFR BLD CALC: 30.5 % — LOW (ref 34.5–45)
HGB BLD-MCNC: 9.8 G/DL — LOW (ref 11.5–15.5)
MAGNESIUM SERPL-MCNC: 2.2 MG/DL — SIGNIFICANT CHANGE UP (ref 1.8–2.6)
MCHC RBC-ENTMCNC: 28.5 PG — SIGNIFICANT CHANGE UP (ref 27–34)
MCHC RBC-ENTMCNC: 32.1 G/DL — SIGNIFICANT CHANGE UP (ref 32–36)
MCV RBC AUTO: 88.7 FL — SIGNIFICANT CHANGE UP (ref 80–100)
NRBC # BLD AUTO: 0 K/UL — SIGNIFICANT CHANGE UP (ref 0–0)
NRBC # FLD: 0 K/UL — SIGNIFICANT CHANGE UP (ref 0–0)
NRBC BLD AUTO-RTO: 0 /100 WBCS — SIGNIFICANT CHANGE UP (ref 0–0)
PHOSPHATE SERPL-MCNC: 3.4 MG/DL — SIGNIFICANT CHANGE UP (ref 2.4–4.7)
PLATELET # BLD AUTO: 515 K/UL — HIGH (ref 150–400)
PMV BLD: 9.2 FL — SIGNIFICANT CHANGE UP (ref 7–13)
POTASSIUM SERPL-MCNC: 4 MMOL/L — SIGNIFICANT CHANGE UP (ref 3.5–5.3)
POTASSIUM SERPL-SCNC: 4 MMOL/L — SIGNIFICANT CHANGE UP (ref 3.5–5.3)
RBC # BLD: 3.44 M/UL — LOW (ref 3.8–5.2)
RBC # FLD: 17.9 % — HIGH (ref 10.3–14.5)
SODIUM SERPL-SCNC: 133 MMOL/L — LOW (ref 135–145)
SPECIMEN SOURCE: SIGNIFICANT CHANGE UP
SPECIMEN SOURCE: SIGNIFICANT CHANGE UP
WBC # BLD: 8.72 K/UL — SIGNIFICANT CHANGE UP (ref 3.8–10.5)
WBC # FLD AUTO: 8.72 K/UL — SIGNIFICANT CHANGE UP (ref 3.8–10.5)

## 2025-04-16 PROCEDURE — 99232 SBSQ HOSP IP/OBS MODERATE 35: CPT

## 2025-04-16 PROCEDURE — 99233 SBSQ HOSP IP/OBS HIGH 50: CPT

## 2025-04-16 RX ORDER — INSULIN GLARGINE-YFGN 100 [IU]/ML
15 INJECTION, SOLUTION SUBCUTANEOUS AT BEDTIME
Refills: 0 | Status: DISCONTINUED | OUTPATIENT
Start: 2025-04-16 | End: 2025-04-18

## 2025-04-16 RX ADMIN — Medication 975 MILLIGRAM(S): at 21:11

## 2025-04-16 RX ADMIN — TICAGRELOR 60 MILLIGRAM(S): 90 TABLET ORAL at 17:37

## 2025-04-16 RX ADMIN — Medication 975 MILLIGRAM(S): at 15:57

## 2025-04-16 RX ADMIN — Medication 975 MILLIGRAM(S): at 16:57

## 2025-04-16 RX ADMIN — METOPROLOL SUCCINATE 50 MILLIGRAM(S): 50 TABLET, EXTENDED RELEASE ORAL at 05:22

## 2025-04-16 RX ADMIN — Medication 25 MILLIGRAM(S): at 15:58

## 2025-04-16 RX ADMIN — SACUBITRIL AND VALSARTAN 1 TABLET(S): 6; 6 PELLET ORAL at 05:22

## 2025-04-16 RX ADMIN — LIDOCAINE HYDROCHLORIDE 1 PATCH: 20 JELLY TOPICAL at 20:54

## 2025-04-16 RX ADMIN — BUMETANIDE 5 MG/HR: 1 TABLET ORAL at 08:55

## 2025-04-16 RX ADMIN — Medication 25 MILLIGRAM(S): at 21:11

## 2025-04-16 RX ADMIN — Medication 975 MILLIGRAM(S): at 05:22

## 2025-04-16 RX ADMIN — INSULIN GLARGINE-YFGN 15 UNIT(S): 100 INJECTION, SOLUTION SUBCUTANEOUS at 21:11

## 2025-04-16 RX ADMIN — SACUBITRIL AND VALSARTAN 1 TABLET(S): 6; 6 PELLET ORAL at 17:36

## 2025-04-16 RX ADMIN — Medication 975 MILLIGRAM(S): at 22:11

## 2025-04-16 RX ADMIN — ATORVASTATIN CALCIUM 80 MILLIGRAM(S): 80 TABLET, FILM COATED ORAL at 21:11

## 2025-04-16 RX ADMIN — SERTRALINE 25 MILLIGRAM(S): 100 TABLET, FILM COATED ORAL at 08:56

## 2025-04-16 RX ADMIN — Medication 50 MILLIGRAM(S): at 05:22

## 2025-04-16 RX ADMIN — METHOCARBAMOL 500 MILLIGRAM(S): 500 TABLET, FILM COATED ORAL at 17:36

## 2025-04-16 RX ADMIN — Medication 81 MILLIGRAM(S): at 08:56

## 2025-04-16 RX ADMIN — METHOCARBAMOL 500 MILLIGRAM(S): 500 TABLET, FILM COATED ORAL at 05:22

## 2025-04-16 RX ADMIN — BUMETANIDE 5 MG/HR: 1 TABLET ORAL at 04:56

## 2025-04-16 RX ADMIN — GABAPENTIN 400 MILLIGRAM(S): 400 CAPSULE ORAL at 08:56

## 2025-04-16 RX ADMIN — LIDOCAINE HYDROCHLORIDE 1 PATCH: 20 JELLY TOPICAL at 08:56

## 2025-04-16 RX ADMIN — Medication 30 MILLILITER(S): at 15:57

## 2025-04-16 RX ADMIN — ISOSORBIDE MONONITRATE 30 MILLIGRAM(S): 60 TABLET, EXTENDED RELEASE ORAL at 08:56

## 2025-04-16 RX ADMIN — TICAGRELOR 60 MILLIGRAM(S): 90 TABLET ORAL at 06:00

## 2025-04-16 NOTE — PROGRESS NOTE ADULT - SUBJECTIVE AND OBJECTIVE BOX
Laura Sampson M.D.    Patient is a 74y old  Female who presents with a chief complaint of Weight gain, poor response to oral diuretics (16 Apr 2025 10:15)      SUBJECTIVE / OVERNIGHT EVENTS: no event overnight.     Patient denies chest pain, SOB, abd pain, N/V, fever, chills, dysuria or any other complaints. All remainder ROS negative.     MEDICATIONS  (STANDING):  acetaminophen     Tablet .. 975 milliGRAM(s) Oral every 8 hours  aspirin  chewable 81 milliGRAM(s) Oral daily  atorvastatin 80 milliGRAM(s) Oral at bedtime  buMETAnide Infusion 1 mG/Hr (5 mL/Hr) IV Continuous <Continuous>  dextrose 5%. 1000 milliLiter(s) (50 mL/Hr) IV Continuous <Continuous>  dextrose 5%. 1000 milliLiter(s) (100 mL/Hr) IV Continuous <Continuous>  dextrose 50% Injectable 25 Gram(s) IV Push once  dextrose 50% Injectable 12.5 Gram(s) IV Push once  dextrose 50% Injectable 25 Gram(s) IV Push once  gabapentin 400 milliGRAM(s) Oral daily  glucagon  Injectable 1 milliGRAM(s) IntraMuscular once  hydrALAZINE 50 milliGRAM(s) Oral three times a day  insulin glargine Injectable (LANTUS) 15 Unit(s) SubCutaneous at bedtime  insulin lispro (ADMELOG) corrective regimen sliding scale   SubCutaneous three times a day before meals  isosorbide   mononitrate ER Tablet (IMDUR) 30 milliGRAM(s) Oral daily  lidocaine   4% Patch 1 Patch Transdermal every 24 hours  methocarbamol 500 milliGRAM(s) Oral two times a day  metoprolol succinate ER 50 milliGRAM(s) Oral daily  sacubitril 24 mG/valsartan 26 mG 1 Tablet(s) Oral two times a day  sertraline 25 milliGRAM(s) Oral daily  ticagrelor 60 milliGRAM(s) Oral every 12 hours    MEDICATIONS  (PRN):  aluminum hydroxide/magnesium hydroxide/simethicone Suspension 30 milliLiter(s) Oral every 4 hours PRN Dyspepsia  dextrose Oral Gel 15 Gram(s) Oral once PRN Blood Glucose LESS THAN 70 milliGRAM(s)/deciliter  oxyCODONE    IR 5 milliGRAM(s) Oral every 6 hours PRN Severe Pain (7 - 10)      I&O's Summary    15 Apr 2025 07:01  -  16 Apr 2025 07:00  --------------------------------------------------------  IN: 0 mL / OUT: 2200 mL / NET: -2200 mL        PHYSICAL EXAM:  Vital Signs Last 24 Hrs  T(C): 36.8 (16 Apr 2025 08:02), Max: 36.8 (16 Apr 2025 05:00)  T(F): 98.3 (16 Apr 2025 08:02), Max: 98.3 (16 Apr 2025 08:02)  HR: 73 (16 Apr 2025 08:02) (61 - 77)  BP: 109/56 (16 Apr 2025 08:02) (104/54 - 139/69)  BP(mean): --  RR: 17 (16 Apr 2025 08:02) (17 - 18)  SpO2: 97% (16 Apr 2025 08:02) (93% - 100%)    Parameters below as of 16 Apr 2025 08:02  Patient On (Oxygen Delivery Method): room air    CONSTITUTIONAL: NAD, on NC  RESPIRATORY: Normal respiratory effort; lungs are clear to auscultation bilaterally  CARDIOVASCULAR: Regular rate and rhythm, +trace edema  ABDOMEN: Nontender to palpation, normoactive bowel sounds  MSK: R knee improved mobility, wrapped in ace bandages    LABS:                        9.8    8.72  )-----------( 515      ( 16 Apr 2025 05:40 )             30.5     04-16    133[L]  |  97  |  27.6[H]  ----------------------------<  83  4.0   |  22.0  |  1.91[H]    Ca    7.1[L]      16 Apr 2025 05:40  Phos  3.4     04-16  Mg     2.2     04-16            Urinalysis Basic - ( 16 Apr 2025 05:40 )    Color: x / Appearance: x / SG: x / pH: x  Gluc: 83 mg/dL / Ketone: x  / Bili: x / Urobili: x   Blood: x / Protein: x / Nitrite: x   Leuk Esterase: x / RBC: x / WBC x   Sq Epi: x / Non Sq Epi: x / Bacteria: x        Culture - Joint (collected 13 Apr 2025 17:30)  Source: Knee Knee  Gram Stain (14 Apr 2025 03:31):    polymorphonuclear leukocytes seen    No organisms seen    by cytocentrifuge  Preliminary Report (14 Apr 2025 18:56):    No growth to date.      CAPILLARY BLOOD GLUCOSE      POCT Blood Glucose.: 107 mg/dL (16 Apr 2025 09:59)  POCT Blood Glucose.: 59 mg/dL (16 Apr 2025 08:38)  POCT Blood Glucose.: 50 mg/dL (16 Apr 2025 08:25)  POCT Blood Glucose.: 48 mg/dL (16 Apr 2025 08:24)  POCT Blood Glucose.: 98 mg/dL (15 Apr 2025 22:45)  POCT Blood Glucose.: 88 mg/dL (15 Apr 2025 21:29)  POCT Blood Glucose.: 84 mg/dL (15 Apr 2025 17:07)  POCT Blood Glucose.: 88 mg/dL (15 Apr 2025 12:12)      RADIOLOGY & ADDITIONAL TESTS:  Results Reviewed:   Imaging Personally Reviewed:  Electrocardiogram Personally Reviewed:

## 2025-04-16 NOTE — DIETITIAN INITIAL EVALUATION ADULT - ORAL INTAKE PTA/DIET HISTORY
Patient Dominican speaking - family at bedside during visit to translate per pt's request. Pt tolerating current diet well with poor appetite/PO intake. Pt states PTA her appetite was poor for about a month due to not feeling well. Pt wasn't eating consistent meals and was picking at food throughout the day. Breakfast meal at bedside with only juice and fruit consumed. Pt with c/o N/V/D yesterday, but none today. Pt's UBW is about 143-146 lbs, but gained weight due to fluid retention. Current weight 153.8 lbs, moderate edema noted. NFPE conducted and no muscle/fat wasting identified at this time. Pt at risk for malnutrition, but does not meet criteria at this time. Pt states PTA she followed a low salt and low carbohydrate/sugar diet. Pt and family provided with additional education on Heart healthy, consistent carbohydrate diet with fluid restriction and questions addressed. Aware pt with hypoglycemia this AM and yesterday AM - recommend adding evening snack to diet order as pt on Lantus. Also consider discontinuing Lantus as pt with CKD and at higher risk for hypoglycemia. Recommend addition of Glucerna daily to increase protein and calorie consumption. Message left with MD for the same via [employer] Teams. Will continue to monitor and follow up as needed. RD remains available.

## 2025-04-16 NOTE — DIETITIAN INITIAL EVALUATION ADULT - OTHER INFO
Per chart "74yoF with a history of coronary artery disease, heart failure, chronic kidney disease, hypertension, diabetes, and anemia who was recently hospitalized for NSTEMI with PCI and bacteremia who presented with dyspnea on exertion and weight gain."

## 2025-04-16 NOTE — PROGRESS NOTE ADULT - ASSESSMENT
Gen Cards: Dr. Allen  HF: Dr. Polanco    73 y/o female with PMH of ICM/HFrEF (LVEF 35-40%), CAD s/p PCI, Mobitz II s/p PPM (MIRNA Dawn), HTN, HLD, PAD with chronic right LE wound, DM2, and CKD3, who was BIBA from CHF clinic due to hypervolemia, weight gain of ~20 lbs in the past 9 days despite escalation of PO diuretics.      She was recently admitted on 3/19/25 with c/o CP and found to have NSTEMI. She underwent LHC on 3/21 which showed mid-distal LAD severe stenosis, OM1 and OM2 severe stenosis, mid ISR 50%, distal ISR 99%, s/p PCI with 1 ZEINAB to RCA.  On 3/22 she had a fever with leukocytosis and her blood cultures were positive for E. Coli/ESBL. Her CT A/P showed left pyelonephritis. She also had an BRADY for which her Entresto was held. She was discharged home 3/31 and her CardioMEMS had been rising. She was advised to increase Torsemide 40 mg daily to twice daily on 4/4. Despite this, she has continued to gain weight and reports poor UOP response.     Admission labwork: Na 125, K 4.6, BUN 37.7, Cr 1.94, eGFR 27, Mg 2.3, proBNP 75391!  CXR: increased interstitial markings bilaterally, cardiomegaly.   On 4/10 she developed a fever, tmax 100.4F. RVP negative, empirically started on IV Zosyn.  4/13 s/p right knee fluid aspiration (s/p recent trauma/fall).    Prior Cardiac Testing:  TTE 3/21/25: LVEF 25-30%, grade II DD, normal RV size/function, severe LAE, moderate MR, mild TR, PASP 58 mmHg ( RAP 8 mmHg), no evidence of LV thrombus, multiple segmental abnormalities/regional WMA.    CardioMEMS PAD (goal 14?):  4/14: 33  4/11: 32  4/9: 32  3/27: 26  3/24: 23  3/21: 22

## 2025-04-16 NOTE — DIETITIAN INITIAL EVALUATION ADULT - ADD RECOMMEND
1) Add evening snack to diet order as pt on Rx Lantus and with multiple episodes of hypoglycemia.   2) Consider discontinuing Rx Lantus as pt with St3 CKD and at higher risk for hypoglycemia.   3) Encourage po intake, monitor diet tolerance, and provide assistance at meals as needed.   4) Add Glucerna daily to optimize po intake and provide an additional 220 kcal, 10g protein per serving.  5) Monitor BG levels, correct prn   6) Rx: MVI daily.   7) Obtain daily weights to monitor trends.

## 2025-04-16 NOTE — DIETITIAN INITIAL EVALUATION ADULT - PERTINENT MEDS FT
MEDICATIONS  (STANDING):  buMETAnide Infusion 1 mG/Hr (5 mL/Hr) IV Continuous <Continuous>  dextrose 5%. 1000 milliLiter(s) (50 mL/Hr) IV Continuous <Continuous>  dextrose 50% Injectable 25 Gram(s) IV Push once  glucagon  Injectable 1 milliGRAM(s) IntraMuscular once  hydrALAZINE 50 milliGRAM(s) Oral three times a day  insulin glargine Injectable (LANTUS) 15 Unit(s) SubCutaneous at bedtime  insulin lispro (ADMELOG) corrective regimen sliding scale   SubCutaneous three times a day before meals  isosorbide   mononitrate ER Tablet (IMDUR) 30 milliGRAM(s) Oral daily  metoprolol succinate ER 50 milliGRAM(s) Oral daily  sacubitril 24 mG/valsartan 26 mG 1 Tablet(s) Oral two times a day  ticagrelor 60 milliGRAM(s) Oral every 12 hours    MEDICATIONS  (PRN):  dextrose Oral Gel 15 Gram(s) Oral once PRN Blood Glucose LESS THAN 70 milliGRAM(s)/deciliter  oxyCODONE    IR 5 milliGRAM(s) Oral every 6 hours PRN Severe Pain (7 - 10)

## 2025-04-16 NOTE — PROGRESS NOTE ADULT - ASSESSMENT
74yoF with a history of coronary artery disease, heart failure, chronic kidney disease, hypertension, diabetes, and anemia who was recently hospitalized for NSTEMI with PCI and bacteremia who presented with dyspnea on exertion and weight gain.    #Acute on chronic systolic heart failure  - Recent echocardiogram noted ejection fraction of 31% with multiple segmental abnormalities  - c/w IV bumex per HF  - Strict in and out  - Daily weights  - On metoprolol  - Heart Failure on board  - pt with reproducible chest wall pain, started on pain regimen    #Right knee pain  - knee xray showing effusion, possible in setting of fall  - pain regimen adjusted  - CT knee result noted  - ortho consulted, sp apiration, fluid neg for crystals/infections    #Fever  - CXR noted with ?Atelectasis vs edema vs infx  - on empiric Zosyn x 5 days  - swallow eval result noted, diet advanced   - Bcx NGTD    #Coronary artery disease  - Continue on aspirin, ticagrelor, and atorvastatin  - Recent cardiac catheterization with PCI  - initial trop 140 -> 118  - pt with recurrent chest pain, high up in chest, trop downtrended   - given maalox as well     #Chronic kidney disease  - Renal function improved from prior values  - Continue to monitor while on diuresis    #Hyponatremia  - Hypervolemic on presentation  - Bumetanide for diuresis  - serial BMP    #Diabetes  - Insulin coverage  - Close monitoring of blood glucose levels  - Gabapentin for neuropathy  - need Glucometer on discharge     DVT ppx: ASA/Brilinta   Dispo: Pending HF optimization  PT eval - [facility], but family prefers home

## 2025-04-16 NOTE — DIETITIAN INITIAL EVALUATION ADULT - NS FNS DIET ORDER
Diet, DASH/TLC:   Sodium & Cholesterol Restricted  Consistent Carbohydrate {No Snacks} (CSTCHO)  1200mL Fluid Restriction ([id number]) (04-15-25 @ 10:13)

## 2025-04-16 NOTE — DIETITIAN INITIAL EVALUATION ADULT - PERTINENT LABORATORY DATA
04-16 Na133 mmol/L[L] Glu 83 mg/dL K+ 4.0 mmol/L Cr  1.91 mg/dL[H] BUN 27.6 mg/dL[H] Phos 3.4 mg/dL     POCT Blood Glucose.: 107 mg/dL (04-16-25 @ 09:59)  A1C with Estimated Average Glucose Result: 8.5 % (03-22-25 @ 04:45)

## 2025-04-16 NOTE — PROGRESS NOTE ADULT - SUBJECTIVE AND OBJECTIVE BOX
[facility] Advanced Heart Failure - Progress Note  [address] [place] [address]  Office Phone: [phone]/Fax: [phone]  Service/On Call Phone [phone]    Subjective/Objective: No acute events overnight. Pt. endorses diarrhea and decreased appetite. No overt HF symptoms. She is tolerating Bumex gtt. with good UOP response, sCr improving today. POC discussed with granddaughter Lexi at bedside.     Medications:  acetaminophen     Tablet .. 975 milliGRAM(s) Oral every 8 hours  aluminum hydroxide/magnesium hydroxide/simethicone Suspension 30 milliLiter(s) Oral every 4 hours PRN  aspirin  chewable 81 milliGRAM(s) Oral daily  atorvastatin 80 milliGRAM(s) Oral at bedtime  buMETAnide Infusion 1 mG/Hr IV Continuous <Continuous>  dextrose 5%. 1000 milliLiter(s) IV Continuous <Continuous>  dextrose 5%. 1000 milliLiter(s) IV Continuous <Continuous>  dextrose 50% Injectable 25 Gram(s) IV Push once  dextrose 50% Injectable 12.5 Gram(s) IV Push once  dextrose 50% Injectable 25 Gram(s) IV Push once  dextrose Oral Gel 15 Gram(s) Oral once PRN  gabapentin 400 milliGRAM(s) Oral daily  glucagon  Injectable 1 milliGRAM(s) IntraMuscular once  hydrALAZINE 50 milliGRAM(s) Oral three times a day  insulin glargine Injectable (LANTUS) 15 Unit(s) SubCutaneous at bedtime  insulin lispro (ADMELOG) corrective regimen sliding scale   SubCutaneous three times a day before meals  isosorbide   mononitrate ER Tablet (IMDUR) 30 milliGRAM(s) Oral daily  lidocaine   4% Patch 1 Patch Transdermal every 24 hours  methocarbamol 500 milliGRAM(s) Oral two times a day  metoprolol succinate ER 50 milliGRAM(s) Oral daily  oxyCODONE    IR 5 milliGRAM(s) Oral every 6 hours PRN  sacubitril 24 mG/valsartan 26 mG 1 Tablet(s) Oral two times a day  sertraline 25 milliGRAM(s) Oral daily  ticagrelor 60 milliGRAM(s) Oral every 12 hours    Vital Signs Last 24 Hours  T(C): 36.8 (25 @ 08:02), Max: 36.8 (25 @ 05:00)  HR: 73 (25 @ 08:02) (61 - 77)  BP: 109/56 (25 @ 08:02) (104/54 - 139/69)  RR: 17 (25 @ 08:02) (17 - 18)  SpO2: 97% (25 @ 08:02) (93% - 100%)    Weight in k ( @ 10:17)    I&O's Summary  15 Apr 2025 07:01  -  2025 07:00  --------------------------------------------------------  IN: 0 mL / OUT: 2200 mL / NET: -2200 mL    Tele:     Physical Exam:  General: In no distress.   Neck: Neck supple. JVP does not appear elevated.   Chest: Course at bases.  CV: RRR. Normal S1 and S2. No murmurs, rub, or gallops. Warm peripherally.  PV: No LE edema noted.   Abdomen: Soft, non-distended, non-tender.  Skin: warm, dry.  Neurology: Alert and oriented times three. Sensation intact.  Psych: Appropriate affect.    Labs:                        9.8    8.72  )-----------( 515      ( 2025 05:40 )             30.5     04-    133[L]  |  97  |  27.6[H]  ----------------------------<  83  4.0   |  22.0  |  1.91[H]    Ca    7.1[L]      2025 05:40  Phos  3.4     -  Mg     2.2     -

## 2025-04-16 NOTE — PROGRESS NOTE ADULT - PROBLEM SELECTOR PLAN 1
- ICM, LVEF 25-30% on 3/21 (previously 35-40%). Can repeat TTE once more optimized.   - Clinically appears close to euvolemia on exam however this is somewhat limited by body habitus.   - Serum proBNP >68K  - Hyponatremia likely 2/2 hypervolemia - improving.  - Diuretics: Continue Bumex infusion at 1mg/hr. Please check BMP BID. Maintain K>4, Mg >2.  - GDMT: Stop Imdur and reduce HDZN to 25 mg TID. C/w Entresto 24-26 mg BID for now with plan to uptitrate. C/w Toprol-XL 50 mg daily. Eventual MRA once renal function stabilizes. No Farxiga 2/2 h/o pyelonephritis.   - Device: s/p Micra PPM with chronic . Consider CRT in future?  - Low Na, 1.5L FR diet recommended  - Please document strict I&Os and daily standing weight.  - May consider repeat RHC/CardioMEMS recalibration if no improvement in PA pressure on MEMS in next 24-48 hrs.

## 2025-04-17 LAB
ANION GAP SERPL CALC-SCNC: 14 MMOL/L — SIGNIFICANT CHANGE UP (ref 5–17)
ANION GAP SERPL CALC-SCNC: 15 MMOL/L — SIGNIFICANT CHANGE UP (ref 5–17)
BUN SERPL-MCNC: 27 MG/DL — HIGH (ref 8–20)
BUN SERPL-MCNC: 27.4 MG/DL — HIGH (ref 8–20)
CALCIUM SERPL-MCNC: 6.9 MG/DL — LOW (ref 8.4–10.5)
CALCIUM SERPL-MCNC: 7.4 MG/DL — LOW (ref 8.4–10.5)
CHLORIDE SERPL-SCNC: 95 MMOL/L — LOW (ref 96–108)
CHLORIDE SERPL-SCNC: 96 MMOL/L — SIGNIFICANT CHANGE UP (ref 96–108)
CO2 SERPL-SCNC: 21 MMOL/L — LOW (ref 22–29)
CO2 SERPL-SCNC: 22 MMOL/L — SIGNIFICANT CHANGE UP (ref 22–29)
CREAT SERPL-MCNC: 1.83 MG/DL — HIGH (ref 0.5–1.3)
CREAT SERPL-MCNC: 1.96 MG/DL — HIGH (ref 0.5–1.3)
EGFR: 27 ML/MIN/1.73M2 — LOW
EGFR: 27 ML/MIN/1.73M2 — LOW
EGFR: 29 ML/MIN/1.73M2 — LOW
EGFR: 29 ML/MIN/1.73M2 — LOW
GI PCR PANEL: SIGNIFICANT CHANGE UP
GLUCOSE BLDC GLUCOMTR-MCNC: 112 MG/DL — HIGH (ref 70–99)
GLUCOSE BLDC GLUCOMTR-MCNC: 158 MG/DL — HIGH (ref 70–99)
GLUCOSE BLDC GLUCOMTR-MCNC: 159 MG/DL — HIGH (ref 70–99)
GLUCOSE BLDC GLUCOMTR-MCNC: 58 MG/DL — LOW (ref 70–99)
GLUCOSE BLDC GLUCOMTR-MCNC: 64 MG/DL — LOW (ref 70–99)
GLUCOSE BLDC GLUCOMTR-MCNC: 94 MG/DL — SIGNIFICANT CHANGE UP (ref 70–99)
GLUCOSE SERPL-MCNC: 133 MG/DL — HIGH (ref 70–99)
GLUCOSE SERPL-MCNC: 167 MG/DL — HIGH (ref 70–99)
MAGNESIUM SERPL-MCNC: 2.1 MG/DL — SIGNIFICANT CHANGE UP (ref 1.6–2.6)
POTASSIUM SERPL-MCNC: 4 MMOL/L — SIGNIFICANT CHANGE UP (ref 3.5–5.3)
POTASSIUM SERPL-MCNC: 4.4 MMOL/L — SIGNIFICANT CHANGE UP (ref 3.5–5.3)
POTASSIUM SERPL-SCNC: 4 MMOL/L — SIGNIFICANT CHANGE UP (ref 3.5–5.3)
POTASSIUM SERPL-SCNC: 4.4 MMOL/L — SIGNIFICANT CHANGE UP (ref 3.5–5.3)
SODIUM SERPL-SCNC: 131 MMOL/L — LOW (ref 135–145)
SODIUM SERPL-SCNC: 132 MMOL/L — LOW (ref 135–145)

## 2025-04-17 PROCEDURE — 99232 SBSQ HOSP IP/OBS MODERATE 35: CPT

## 2025-04-17 PROCEDURE — 99233 SBSQ HOSP IP/OBS HIGH 50: CPT

## 2025-04-17 RX ORDER — MECLIZINE HCL 12.5 MG
12.5 TABLET ORAL
Refills: 0 | Status: COMPLETED | OUTPATIENT
Start: 2025-04-17 | End: 2025-04-19

## 2025-04-17 RX ORDER — SACUBITRIL AND VALSARTAN 6; 6 MG/1; MG/1
1 PELLET ORAL
Refills: 0 | Status: DISCONTINUED | OUTPATIENT
Start: 2025-04-17 | End: 2025-04-22

## 2025-04-17 RX ORDER — INSULIN GLARGINE-YFGN 100 [IU]/ML
5 INJECTION, SOLUTION SUBCUTANEOUS ONCE
Refills: 0 | Status: COMPLETED | OUTPATIENT
Start: 2025-04-17 | End: 2025-04-17

## 2025-04-17 RX ADMIN — Medication 975 MILLIGRAM(S): at 14:40

## 2025-04-17 RX ADMIN — SERTRALINE 25 MILLIGRAM(S): 100 TABLET, FILM COATED ORAL at 11:37

## 2025-04-17 RX ADMIN — Medication 25 MILLIGRAM(S): at 05:25

## 2025-04-17 RX ADMIN — GABAPENTIN 400 MILLIGRAM(S): 400 CAPSULE ORAL at 11:37

## 2025-04-17 RX ADMIN — SACUBITRIL AND VALSARTAN 1 TABLET(S): 6; 6 PELLET ORAL at 17:20

## 2025-04-17 RX ADMIN — Medication 975 MILLIGRAM(S): at 23:02

## 2025-04-17 RX ADMIN — LIDOCAINE HYDROCHLORIDE 1 PATCH: 20 JELLY TOPICAL at 19:59

## 2025-04-17 RX ADMIN — Medication 25 MILLIGRAM(S): at 14:40

## 2025-04-17 RX ADMIN — LIDOCAINE HYDROCHLORIDE 1 PATCH: 20 JELLY TOPICAL at 11:37

## 2025-04-17 RX ADMIN — METHOCARBAMOL 500 MILLIGRAM(S): 500 TABLET, FILM COATED ORAL at 05:25

## 2025-04-17 RX ADMIN — INSULIN LISPRO 2: 100 INJECTION, SOLUTION INTRAVENOUS; SUBCUTANEOUS at 17:20

## 2025-04-17 RX ADMIN — SACUBITRIL AND VALSARTAN 1 TABLET(S): 6; 6 PELLET ORAL at 05:25

## 2025-04-17 RX ADMIN — METHOCARBAMOL 500 MILLIGRAM(S): 500 TABLET, FILM COATED ORAL at 17:20

## 2025-04-17 RX ADMIN — Medication 975 MILLIGRAM(S): at 15:40

## 2025-04-17 RX ADMIN — Medication 25 MILLIGRAM(S): at 23:02

## 2025-04-17 RX ADMIN — BUMETANIDE 5 MG/HR: 1 TABLET ORAL at 11:38

## 2025-04-17 RX ADMIN — LIDOCAINE HYDROCHLORIDE 1 PATCH: 20 JELLY TOPICAL at 23:00

## 2025-04-17 RX ADMIN — INSULIN LISPRO 2: 100 INJECTION, SOLUTION INTRAVENOUS; SUBCUTANEOUS at 12:44

## 2025-04-17 RX ADMIN — Medication 975 MILLIGRAM(S): at 05:25

## 2025-04-17 RX ADMIN — TICAGRELOR 60 MILLIGRAM(S): 90 TABLET ORAL at 17:20

## 2025-04-17 RX ADMIN — TICAGRELOR 60 MILLIGRAM(S): 90 TABLET ORAL at 05:25

## 2025-04-17 RX ADMIN — INSULIN GLARGINE-YFGN 5 UNIT(S): 100 INJECTION, SOLUTION SUBCUTANEOUS at 23:55

## 2025-04-17 RX ADMIN — Medication 40 MILLIGRAM(S): at 05:25

## 2025-04-17 RX ADMIN — Medication 12.5 MILLIGRAM(S): at 17:20

## 2025-04-17 RX ADMIN — ATORVASTATIN CALCIUM 80 MILLIGRAM(S): 80 TABLET, FILM COATED ORAL at 23:03

## 2025-04-17 RX ADMIN — Medication 81 MILLIGRAM(S): at 11:37

## 2025-04-17 RX ADMIN — Medication 12.5 MILLIGRAM(S): at 11:37

## 2025-04-17 RX ADMIN — METOPROLOL SUCCINATE 50 MILLIGRAM(S): 50 TABLET, EXTENDED RELEASE ORAL at 05:25

## 2025-04-17 NOTE — PROGRESS NOTE ADULT - ASSESSMENT
Gen Cards: Dr. Allen  HF: Dr. Polanco    73 y/o female with PMH of ICM/HFrEF (LVEF 35-40%), CAD s/p PCI, Mobitz II s/p PPM (MIRNA Dawn), HTN, HLD, PAD with chronic right LE wound, DM2, and CKD3, who was BIBA from CHF clinic due to hypervolemia, weight gain of ~20 lbs in the past 9 days despite escalation of PO diuretics.      She was recently admitted on 3/19/25 with c/o CP and found to have NSTEMI. She underwent LHC on 3/21 which showed mid-distal LAD severe stenosis, OM1 and OM2 severe stenosis, mid ISR 50%, distal ISR 99%, s/p PCI with 1 ZEINAB to RCA.  On 3/22 she had a fever with leukocytosis and her blood cultures were positive for E. Coli/ESBL. Her CT A/P showed left pyelonephritis. She also had an BRADY for which her Entresto was held. She was discharged home 3/31 and her CardioMEMS was not at goal and had been rising. She was advised to increase Torsemide 40 mg daily to twice daily on 4/4. Despite this, she has continued to gain weight and reports poor UOP response.     Admission labwork: Na 125, K 4.6, BUN 37.7, Cr 1.94, eGFR 27, Mg 2.3, proBNP 26289!  CXR: increased interstitial markings bilaterally, cardiomegaly.   On 4/10 she developed a fever, tmax 100.4F. RVP negative, empirically started on IV Zosyn.  4/13 s/p right knee fluid aspiration (s/p recent trauma/fall).  4/15 initiated on continuous Bumex infusion for inadequate response to intermittent dosing.    Prior Cardiac Testing:  TTE 3/21/25: LVEF 25-30%, grade II DD, normal RV size/function, severe LAE, moderate MR, mild TR, PASP 58 mmHg ( RAP 8 mmHg), no evidence of LV thrombus, multiple segmental abnormalities/regional WMA.    CardioMEMS PAD (goal 14):  4/17: 27 (in chair ~30 degrees).  4/14: 30-33  4/11: 32  4/9: 32  3/27: 26  3/24: 23  3/21: 22

## 2025-04-17 NOTE — PROGRESS NOTE ADULT - ASSESSMENT
74yoF with a history of coronary artery disease, heart failure, chronic kidney disease, hypertension, diabetes, and anemia who was recently hospitalized for NSTEMI with PCI and bacteremia who presented with dyspnea on exertion and weight gain.    #Acute on chronic systolic heart failure  - Recent echocardiogram noted ejection fraction of 31% with multiple segmental abnormalities  - c/w IV bumex per HF  - Strict in and out  - Daily weights  - On metoprolol  - Heart Failure on board  - pt with reproducible chest wall pain, started on pain regimen    #Right knee pain  - knee xray showing effusion, possible in setting of fall  - pain regimen adjusted  - CT knee result noted  - ortho consulted, sp aspiration fluid neg for crystals/infections    #Fever  - CXR noted with ?Atelectasis vs edema vs infx  - sp Zosyn x 5 days   - swallow eval result noted, diet advanced   - Bcx NGTD    #Diarrhea  - possibly abx related  - f/u GI pcr  - start imodium if pcr negative     #Coronary artery disease  - Continue on aspirin, ticagrelor, and atorvastatin  - Recent cardiac catheterization with PCI  - initial trop 140 -> 118  - pt with recurrent chest pain, high up in chest, trop downtrended   - given maalox as well     #Chronic kidney disease  - Renal function improved from prior values  - Continue to monitor while on diuresis    #Hyponatremia  - Hypervolemic on presentation  - Bumetanide for diuresis  - serial BMP    #Diabetes  - Insulin coverage  - Close monitoring of blood glucose levels  - Gabapentin for neuropathy  - need Glucometer on discharge     DVT ppx: ASA/Brilinta   Dispo: Pending HF optimization  PT eval - [facility], but family prefers home

## 2025-04-17 NOTE — CHART NOTE - NSCHARTNOTEFT_GEN_A_CORE
Called by RN to notify pt's FS was in 50s earlier, improved after having dinner to 112  Pt received 2U before meals for FS of 150s  Pt was initially receiving 25U of Lantus, decreased to 15U 4/16  Given pt's FS been labile, will be cautious and give only 5U of lantus tonight  RN was made aware.

## 2025-04-17 NOTE — PROGRESS NOTE ADULT - SUBJECTIVE AND OBJECTIVE BOX
[facility] Advanced Heart Failure - Progress Note  [address] [place] [address]  Office Phone: [phone]/Fax: [phone]  Service/On Call Phone [phone]    Subjective/Objective: Patient diuresing well on Bumex gtt. Denies overt HF symptoms.     Medications:  acetaminophen     Tablet .. 975 milliGRAM(s) Oral every 8 hours  aluminum hydroxide/magnesium hydroxide/simethicone Suspension 30 milliLiter(s) Oral every 4 hours PRN  aspirin  chewable 81 milliGRAM(s) Oral daily  atorvastatin 80 milliGRAM(s) Oral at bedtime  buMETAnide Infusion 1 mG/Hr IV Continuous <Continuous>  dextrose 5%. 1000 milliLiter(s) IV Continuous <Continuous>  dextrose 5%. 1000 milliLiter(s) IV Continuous <Continuous>  dextrose 50% Injectable 25 Gram(s) IV Push once  dextrose 50% Injectable 12.5 Gram(s) IV Push once  dextrose 50% Injectable 25 Gram(s) IV Push once  dextrose Oral Gel 15 Gram(s) Oral once PRN  gabapentin 400 milliGRAM(s) Oral daily  glucagon  Injectable 1 milliGRAM(s) IntraMuscular once  hydrALAZINE 25 milliGRAM(s) Oral three times a day  insulin glargine Injectable (LANTUS) 15 Unit(s) SubCutaneous at bedtime  insulin lispro (ADMELOG) corrective regimen sliding scale   SubCutaneous three times a day before meals  lidocaine   4% Patch 1 Patch Transdermal every 24 hours  meclizine 12.5 milliGRAM(s) Oral two times a day  methocarbamol 500 milliGRAM(s) Oral two times a day  metoprolol succinate ER 50 milliGRAM(s) Oral daily  oxyCODONE    IR 5 milliGRAM(s) Oral every 6 hours PRN  pantoprazole    Tablet 40 milliGRAM(s) Oral before breakfast  sacubitril 49 mG/valsartan 51 mG 1 Tablet(s) Oral two times a day  sertraline 25 milliGRAM(s) Oral daily  ticagrelor 60 milliGRAM(s) Oral every 12 hours    Vital Signs Last 24 Hours  T(C): 36.6 (25 @ 07:28), Max: 36.9 (25 @ 12:30)  HR: 61 (25 @ 07:28) (61 - 80)  BP: 136/69 (25 @ 07:28) (110/60 - 146/61)  RR: 20 (25 @ 07:28) (18 - 20)  SpO2: 100% (25 @ 07:28) (96% - 100%)    Weight in k.8 ( @ 09:01)    I&O's Summary  2025 07:01  -  2025 07:00  --------------------------------------------------------  IN: 925 mL / OUT: 3150 mL / NET: -2225 mL    2025 07:01  -  2025 12:07  --------------------------------------------------------  IN: 260 mL / OUT: 400 mL / NET: -140 mL    Tele:     Physical Exam:  General: Up in chair, in no distress.  Neck: Neck supple. JVP difficult to assess.   Chest: Clear to auscultation bilaterally.  CV: RRR. Normal S1 and S2. No murmurs, rub, or gallops. Warm peripherally.  PV: No LE edema noted.   Abdomen: Soft, non-distended, non-tender.  Skin: warm, dry.  Neurology: Alert and oriented times three. Sensation intact.  Psych: Appropriate affect.    Labs:                        9.8    8.72  )-----------( 515      ( 2025 05:40 )             30.5     04-16    131[L]  |  95[L]  |  27.4[H]  ----------------------------<  167[H]  4.0   |  22.0  |  1.96[H]    Ca    6.9[L]      2025 22:45  Phos  3.4     04-16  Mg     2.2     04-16

## 2025-04-17 NOTE — PROGRESS NOTE ADULT - PROBLEM SELECTOR PLAN 1
- ICM, LVEF 25-30% on 3/21 (previously 35-40%). Can repeat TTE once more optimized.   - Clinically appears euvolemic on exam however this is somewhat limited by body habitus.   - Serum proBNP >68K  - Hyponatremia likely 2/2 hypervolemia - improving.  - CardioMEMS PAD slowly improving - 27 today (goal closer to 14).   - Diuretics: Continue Bumex infusion at 1mg/hr. Please check BMP BID. Maintain K>4, Mg >2.  - GDMT: Increase Entresto to 49-51 mg BID. C/w HDZN 25 mg TID and Toprol-XL 50 mg nightly. Plan to continue to increase ARNi and reduce HDZN if renal fx remains stable. Eventual MRA once renal function stabilizes. No Farxiga 2/2 h/o pyelonephritis.   - Device: s/p Micra PPM with chronic . Consider CRT in future?  - Low Na, 1.5L FR diet recommended  - Please document strict I&Os and daily standing weight. - ICM, LVEF 25-30% on 3/21 (previously 35-40%). Can repeat TTE once more optimized.   - Clinically appears euvolemic on exam however this is somewhat limited by body habitus.   - Serum proBNP >68K  - Hyponatremia likely 2/2 hypervolemia - improving.  - CardioMEMS PAD slowly improving - 27 today (goal closer to 14).   - Diuretics: Continue Bumex infusion at 1mg/hr. Goal net negative 2-2.5L/day.  - Please check BMP BID. Maintain K>4, Mg >2.  - GDMT: Increase Entresto to 49-51 mg BID. C/w HDZN 25 mg TID and Toprol-XL 50 mg nightly. Plan to continue to increase ARNi and reduce HDZN if renal fx remains stable. Eventual MRA once renal function stabilizes. No Farxiga 2/2 h/o pyelonephritis.   - Device: s/p Micra PPM with chronic . Consider CRT in future?  - Low Na, 1.5L FR diet recommended  - Please document strict I&Os and daily standing weight.

## 2025-04-17 NOTE — PROGRESS NOTE ADULT - SUBJECTIVE AND OBJECTIVE BOX
Laura Sampson M.D.    Patient is a 74y old  Female who presents with a chief complaint of Decompensated HF (16 Apr 2025 11:50)      SUBJECTIVE / OVERNIGHT EVENTS: son translated at bedside. Reports some vertigo symptoms, as well as diarrhea.     Patient denies chest pain, SOB, abd pain, N/V, fever, chills, dysuria or any other complaints. All remainder ROS negative.     MEDICATIONS  (STANDING):  acetaminophen     Tablet .. 975 milliGRAM(s) Oral every 8 hours  aspirin  chewable 81 milliGRAM(s) Oral daily  atorvastatin 80 milliGRAM(s) Oral at bedtime  buMETAnide Infusion 1 mG/Hr (5 mL/Hr) IV Continuous <Continuous>  dextrose 5%. 1000 milliLiter(s) (50 mL/Hr) IV Continuous <Continuous>  dextrose 5%. 1000 milliLiter(s) (100 mL/Hr) IV Continuous <Continuous>  dextrose 50% Injectable 25 Gram(s) IV Push once  dextrose 50% Injectable 12.5 Gram(s) IV Push once  dextrose 50% Injectable 25 Gram(s) IV Push once  gabapentin 400 milliGRAM(s) Oral daily  glucagon  Injectable 1 milliGRAM(s) IntraMuscular once  hydrALAZINE 25 milliGRAM(s) Oral three times a day  insulin glargine Injectable (LANTUS) 15 Unit(s) SubCutaneous at bedtime  insulin lispro (ADMELOG) corrective regimen sliding scale   SubCutaneous three times a day before meals  lidocaine   4% Patch 1 Patch Transdermal every 24 hours  meclizine 12.5 milliGRAM(s) Oral two times a day  methocarbamol 500 milliGRAM(s) Oral two times a day  metoprolol succinate ER 50 milliGRAM(s) Oral daily  pantoprazole    Tablet 40 milliGRAM(s) Oral before breakfast  sacubitril 49 mG/valsartan 51 mG 1 Tablet(s) Oral two times a day  sertraline 25 milliGRAM(s) Oral daily  ticagrelor 60 milliGRAM(s) Oral every 12 hours    MEDICATIONS  (PRN):  aluminum hydroxide/magnesium hydroxide/simethicone Suspension 30 milliLiter(s) Oral every 4 hours PRN Dyspepsia  dextrose Oral Gel 15 Gram(s) Oral once PRN Blood Glucose LESS THAN 70 milliGRAM(s)/deciliter  oxyCODONE    IR 5 milliGRAM(s) Oral every 6 hours PRN Severe Pain (7 - 10)      I&O's Summary    16 Apr 2025 07:01  -  17 Apr 2025 07:00  --------------------------------------------------------  IN: 925 mL / OUT: 3150 mL / NET: -2225 mL    17 Apr 2025 07:01  -  17 Apr 2025 12:11  --------------------------------------------------------  IN: 260 mL / OUT: 400 mL / NET: -140 mL        PHYSICAL EXAM:  Vital Signs Last 24 Hrs  T(C): 36.6 (17 Apr 2025 07:28), Max: 36.9 (16 Apr 2025 12:30)  T(F): 97.9 (17 Apr 2025 07:28), Max: 98.4 (16 Apr 2025 12:30)  HR: 61 (17 Apr 2025 07:28) (61 - 80)  BP: 136/69 (17 Apr 2025 07:28) (110/60 - 146/61)  BP(mean): 80 (16 Apr 2025 12:30) (80 - 80)  RR: 20 (17 Apr 2025 07:28) (18 - 20)  SpO2: 100% (17 Apr 2025 07:28) (96% - 100%)    Parameters below as of 17 Apr 2025 07:28  Patient On (Oxygen Delivery Method): room air    CONSTITUTIONAL: NAD, on NC  RESPIRATORY: Normal respiratory effort; lungs are clear to auscultation bilaterally  CARDIOVASCULAR: Regular rate and rhythm, no edema  ABDOMEN: Nontender to palpation, normoactive bowel sounds  MSK: R knee improved mobility, wrapped in ace bandages    LABS:                        9.8    8.72  )-----------( 515      ( 16 Apr 2025 05:40 )             30.5     04-16    131[L]  |  95[L]  |  27.4[H]  ----------------------------<  167[H]  4.0   |  22.0  |  1.96[H]    Ca    6.9[L]      16 Apr 2025 22:45  Phos  3.4     04-16  Mg     2.2     04-16            Urinalysis Basic - ( 16 Apr 2025 22:45 )    Color: x / Appearance: x / SG: x / pH: x  Gluc: 167 mg/dL / Ketone: x  / Bili: x / Urobili: x   Blood: x / Protein: x / Nitrite: x   Leuk Esterase: x / RBC: x / WBC x   Sq Epi: x / Non Sq Epi: x / Bacteria: x        CAPILLARY BLOOD GLUCOSE      POCT Blood Glucose.: 94 mg/dL (17 Apr 2025 08:15)  POCT Blood Glucose.: 176 mg/dL (16 Apr 2025 21:03)  POCT Blood Glucose.: 128 mg/dL (16 Apr 2025 17:03)      RADIOLOGY & ADDITIONAL TESTS:  Results Reviewed:   Imaging Personally Reviewed:  Electrocardiogram Personally Reviewed:

## 2025-04-18 LAB
ANION GAP SERPL CALC-SCNC: 14 MMOL/L — SIGNIFICANT CHANGE UP (ref 5–17)
BUN SERPL-MCNC: 27.2 MG/DL — HIGH (ref 8–20)
CALCIUM SERPL-MCNC: 7.5 MG/DL — LOW (ref 8.4–10.5)
CHLORIDE SERPL-SCNC: 101 MMOL/L — SIGNIFICANT CHANGE UP (ref 96–108)
CO2 SERPL-SCNC: 22 MMOL/L — SIGNIFICANT CHANGE UP (ref 22–29)
CREAT SERPL-MCNC: 1.83 MG/DL — HIGH (ref 0.5–1.3)
EGFR: 29 ML/MIN/1.73M2 — LOW
EGFR: 29 ML/MIN/1.73M2 — LOW
GLUCOSE BLDC GLUCOMTR-MCNC: 112 MG/DL — HIGH (ref 70–99)
GLUCOSE BLDC GLUCOMTR-MCNC: 146 MG/DL — HIGH (ref 70–99)
GLUCOSE BLDC GLUCOMTR-MCNC: 159 MG/DL — HIGH (ref 70–99)
GLUCOSE BLDC GLUCOMTR-MCNC: 229 MG/DL — HIGH (ref 70–99)
GLUCOSE SERPL-MCNC: 116 MG/DL — HIGH (ref 70–99)
HCT VFR BLD CALC: 29 % — LOW (ref 34.5–45)
HGB BLD-MCNC: 9.3 G/DL — LOW (ref 11.5–15.5)
MAGNESIUM SERPL-MCNC: 2.2 MG/DL — SIGNIFICANT CHANGE UP (ref 1.6–2.6)
MCHC RBC-ENTMCNC: 28.4 PG — SIGNIFICANT CHANGE UP (ref 27–34)
MCHC RBC-ENTMCNC: 32.1 G/DL — SIGNIFICANT CHANGE UP (ref 32–36)
MCV RBC AUTO: 88.4 FL — SIGNIFICANT CHANGE UP (ref 80–100)
NRBC # BLD AUTO: 0 K/UL — SIGNIFICANT CHANGE UP (ref 0–0)
NRBC # FLD: 0 K/UL — SIGNIFICANT CHANGE UP (ref 0–0)
NRBC BLD AUTO-RTO: 0 /100 WBCS — SIGNIFICANT CHANGE UP (ref 0–0)
PHOSPHATE SERPL-MCNC: 3.2 MG/DL — SIGNIFICANT CHANGE UP (ref 2.4–4.7)
PLATELET # BLD AUTO: 477 K/UL — HIGH (ref 150–400)
PMV BLD: 8.9 FL — SIGNIFICANT CHANGE UP (ref 7–13)
POTASSIUM SERPL-MCNC: 4.1 MMOL/L — SIGNIFICANT CHANGE UP (ref 3.5–5.3)
POTASSIUM SERPL-SCNC: 4.1 MMOL/L — SIGNIFICANT CHANGE UP (ref 3.5–5.3)
RBC # BLD: 3.28 M/UL — LOW (ref 3.8–5.2)
RBC # FLD: 18 % — HIGH (ref 10.3–14.5)
SODIUM SERPL-SCNC: 137 MMOL/L — SIGNIFICANT CHANGE UP (ref 135–145)
WBC # BLD: 9.2 K/UL — SIGNIFICANT CHANGE UP (ref 3.8–10.5)
WBC # FLD AUTO: 9.2 K/UL — SIGNIFICANT CHANGE UP (ref 3.8–10.5)

## 2025-04-18 PROCEDURE — 99233 SBSQ HOSP IP/OBS HIGH 50: CPT

## 2025-04-18 RX ORDER — LOPERAMIDE HCL 1 MG/7.5ML
2 SOLUTION ORAL
Refills: 0 | Status: DISCONTINUED | OUTPATIENT
Start: 2025-04-18 | End: 2025-05-09

## 2025-04-18 RX ORDER — INSULIN GLARGINE-YFGN 100 [IU]/ML
5 INJECTION, SOLUTION SUBCUTANEOUS AT BEDTIME
Refills: 0 | Status: DISCONTINUED | OUTPATIENT
Start: 2025-04-18 | End: 2025-05-09

## 2025-04-18 RX ADMIN — BUMETANIDE 5 MG/HR: 1 TABLET ORAL at 15:55

## 2025-04-18 RX ADMIN — Medication 975 MILLIGRAM(S): at 21:10

## 2025-04-18 RX ADMIN — Medication 81 MILLIGRAM(S): at 12:38

## 2025-04-18 RX ADMIN — LIDOCAINE HYDROCHLORIDE 1 PATCH: 20 JELLY TOPICAL at 12:38

## 2025-04-18 RX ADMIN — Medication 975 MILLIGRAM(S): at 14:44

## 2025-04-18 RX ADMIN — Medication 975 MILLIGRAM(S): at 15:45

## 2025-04-18 RX ADMIN — Medication 975 MILLIGRAM(S): at 06:28

## 2025-04-18 RX ADMIN — Medication 25 MILLIGRAM(S): at 21:10

## 2025-04-18 RX ADMIN — Medication 12.5 MILLIGRAM(S): at 06:29

## 2025-04-18 RX ADMIN — METOPROLOL SUCCINATE 50 MILLIGRAM(S): 50 TABLET, EXTENDED RELEASE ORAL at 06:28

## 2025-04-18 RX ADMIN — GABAPENTIN 400 MILLIGRAM(S): 400 CAPSULE ORAL at 12:38

## 2025-04-18 RX ADMIN — SERTRALINE 25 MILLIGRAM(S): 100 TABLET, FILM COATED ORAL at 12:38

## 2025-04-18 RX ADMIN — Medication 40 MILLIGRAM(S): at 06:29

## 2025-04-18 RX ADMIN — INSULIN LISPRO 2: 100 INJECTION, SOLUTION INTRAVENOUS; SUBCUTANEOUS at 12:38

## 2025-04-18 RX ADMIN — SACUBITRIL AND VALSARTAN 1 TABLET(S): 6; 6 PELLET ORAL at 06:28

## 2025-04-18 RX ADMIN — METHOCARBAMOL 500 MILLIGRAM(S): 500 TABLET, FILM COATED ORAL at 06:28

## 2025-04-18 RX ADMIN — TICAGRELOR 60 MILLIGRAM(S): 90 TABLET ORAL at 17:44

## 2025-04-18 RX ADMIN — Medication 25 MILLIGRAM(S): at 06:28

## 2025-04-18 RX ADMIN — METHOCARBAMOL 500 MILLIGRAM(S): 500 TABLET, FILM COATED ORAL at 17:44

## 2025-04-18 RX ADMIN — INSULIN GLARGINE-YFGN 5 UNIT(S): 100 INJECTION, SOLUTION SUBCUTANEOUS at 21:10

## 2025-04-18 RX ADMIN — Medication 975 MILLIGRAM(S): at 07:22

## 2025-04-18 RX ADMIN — SACUBITRIL AND VALSARTAN 1 TABLET(S): 6; 6 PELLET ORAL at 17:44

## 2025-04-18 RX ADMIN — ATORVASTATIN CALCIUM 80 MILLIGRAM(S): 80 TABLET, FILM COATED ORAL at 21:10

## 2025-04-18 RX ADMIN — LIDOCAINE HYDROCHLORIDE 1 PATCH: 20 JELLY TOPICAL at 19:30

## 2025-04-18 RX ADMIN — Medication 25 MILLIGRAM(S): at 14:44

## 2025-04-18 RX ADMIN — TICAGRELOR 60 MILLIGRAM(S): 90 TABLET ORAL at 06:28

## 2025-04-18 RX ADMIN — Medication 975 MILLIGRAM(S): at 00:02

## 2025-04-18 RX ADMIN — Medication 12.5 MILLIGRAM(S): at 17:44

## 2025-04-18 NOTE — PROVIDER CONTACT NOTE (MEDICATION) - SITUATION
Patient blood sugar was checked at 2129 it was 58mg/dl, did a repeat on the other finger at 2130 it was 64mg/dl. patient awake/oriented x4, asymptomatic. Po intake given and patient tolerated with no problems.

## 2025-04-18 NOTE — PROGRESS NOTE ADULT - ASSESSMENT
74yoF with a history of coronary artery disease, heart failure, chronic kidney disease, hypertension, diabetes, and anemia who was recently hospitalized for NSTEMI with PCI and bacteremia who presented with dyspnea on exertion and weight gain.    #Acute on chronic systolic heart failure  - Recent echocardiogram noted ejection fraction of 31% with multiple segmental abnormalities  - c/w IV bumex per HF  - Strict in and out  - Daily weights  - On metoprolol  - Heart Failure on board  - pt with reproducible chest wall pain, started on pain regimen, now pain resolved    #Right knee pain  - knee xray showing effusion, possible in setting of fall  - pain regimen adjusted  - CT knee result noted  - ortho consulted, sp aspiration fluid neg for crystals/infections    #Fever  - CXR noted with ?Atelectasis vs edema vs infx  - sp Zosyn x 5 days   - swallow eval result noted, diet advanced   - Bcx NGTD    #Diarrhea  - possibly abx related  - GI pcr neg, PRN imodium started    #Coronary artery disease  - Continue on aspirin, ticagrelor, and atorvastatin  - Recent cardiac catheterization with PCI  - initial trop 140 -> 118  - pt with recurrent chest pain, high up in chest, trop downtrended   - given maalox as well     #Chronic kidney disease  - Renal function improved from prior values  - Continue to monitor while on diuresis    #Hyponatremia  - Hypervolemic on presentation  - Bumetanide for diuresis  - serial BMP    #Diabetes  - Insulin coverage  - Close monitoring of blood glucose levels  - Gabapentin for neuropathy  - need Glucometer on discharge     DVT ppx: ASA/Brilinta   Dispo: Pending HF optimization and weaning off Bumex drip   PT eval - [facility], but family prefers home

## 2025-04-18 NOTE — PROGRESS NOTE ADULT - SUBJECTIVE AND OBJECTIVE BOX
Laura Sampson M.D.    Patient is a 74y old  Female who presents with a chief complaint of Acute decompensated heart failure (17 Apr 2025 12:07)      SUBJECTIVE / OVERNIGHT EVENTS: no event overnight.     Patient denies chest pain, abd pain, N/V, fever, chills, dysuria or any other complaints. All remainder ROS negative.     MEDICATIONS  (STANDING):  acetaminophen     Tablet .. 975 milliGRAM(s) Oral every 8 hours  aspirin  chewable 81 milliGRAM(s) Oral daily  atorvastatin 80 milliGRAM(s) Oral at bedtime  buMETAnide Infusion 1 mG/Hr (5 mL/Hr) IV Continuous <Continuous>  dextrose 5%. 1000 milliLiter(s) (50 mL/Hr) IV Continuous <Continuous>  dextrose 5%. 1000 milliLiter(s) (100 mL/Hr) IV Continuous <Continuous>  dextrose 50% Injectable 25 Gram(s) IV Push once  dextrose 50% Injectable 12.5 Gram(s) IV Push once  dextrose 50% Injectable 25 Gram(s) IV Push once  gabapentin 400 milliGRAM(s) Oral daily  glucagon  Injectable 1 milliGRAM(s) IntraMuscular once  hydrALAZINE 25 milliGRAM(s) Oral three times a day  insulin glargine Injectable (LANTUS) 5 Unit(s) SubCutaneous at bedtime  insulin lispro (ADMELOG) corrective regimen sliding scale   SubCutaneous three times a day before meals  lidocaine   4% Patch 1 Patch Transdermal every 24 hours  meclizine 12.5 milliGRAM(s) Oral two times a day  methocarbamol 500 milliGRAM(s) Oral two times a day  metoprolol succinate ER 50 milliGRAM(s) Oral daily  pantoprazole    Tablet 40 milliGRAM(s) Oral before breakfast  sacubitril 49 mG/valsartan 51 mG 1 Tablet(s) Oral two times a day  sertraline 25 milliGRAM(s) Oral daily  ticagrelor 60 milliGRAM(s) Oral every 12 hours    MEDICATIONS  (PRN):  aluminum hydroxide/magnesium hydroxide/simethicone Suspension 30 milliLiter(s) Oral every 4 hours PRN Dyspepsia  dextrose Oral Gel 15 Gram(s) Oral once PRN Blood Glucose LESS THAN 70 milliGRAM(s)/deciliter  loperamide 2 milliGRAM(s) Oral two times a day PRN Diarrhea  oxyCODONE    IR 5 milliGRAM(s) Oral every 6 hours PRN Severe Pain (7 - 10)      I&O's Summary    17 Apr 2025 07:01  -  18 Apr 2025 07:00  --------------------------------------------------------  IN: 1160 mL / OUT: 3200 mL / NET: -2040 mL        PHYSICAL EXAM:  Vital Signs Last 24 Hrs  T(C): 36.6 (18 Apr 2025 07:40), Max: 36.9 (17 Apr 2025 12:00)  T(F): 97.9 (18 Apr 2025 07:40), Max: 98.4 (17 Apr 2025 12:00)  HR: 65 (18 Apr 2025 07:40) (60 - 67)  BP: 143/70 (18 Apr 2025 07:40) (132/67 - 147/73)  BP(mean): 97 (17 Apr 2025 14:30) (80 - 97)  RR: 18 (18 Apr 2025 07:40) (18 - 18)  SpO2: 96% (18 Apr 2025 07:40) (95% - 100%)    Parameters below as of 18 Apr 2025 07:40  Patient On (Oxygen Delivery Method): room air    CONSTITUTIONAL: NAD, on NC  RESPIRATORY: Normal respiratory effort; lungs are clear to auscultation bilaterally  CARDIOVASCULAR: Regular rate and rhythm, no edema  ABDOMEN: Nontender to palpation, normoactive bowel sounds  MSK: R knee improved mobility, wrapped in ace bandages    LABS:                        9.3    9.20  )-----------( 477      ( 18 Apr 2025 05:30 )             29.0     04-18    137  |  101  |  27.2[H]  ----------------------------<  116[H]  4.1   |  22.0  |  1.83[H]    Ca    7.5[L]      18 Apr 2025 05:30  Phos  3.2     04-18  Mg     2.2     04-18            Urinalysis Basic - ( 18 Apr 2025 05:30 )    Color: x / Appearance: x / SG: x / pH: x  Gluc: 116 mg/dL / Ketone: x  / Bili: x / Urobili: x   Blood: x / Protein: x / Nitrite: x   Leuk Esterase: x / RBC: x / WBC x   Sq Epi: x / Non Sq Epi: x / Bacteria: x        CAPILLARY BLOOD GLUCOSE      POCT Blood Glucose.: 112 mg/dL (18 Apr 2025 08:14)  POCT Blood Glucose.: 112 mg/dL (17 Apr 2025 22:55)  POCT Blood Glucose.: 64 mg/dL (17 Apr 2025 21:30)  POCT Blood Glucose.: 58 mg/dL (17 Apr 2025 21:29)  POCT Blood Glucose.: 158 mg/dL (17 Apr 2025 17:02)  POCT Blood Glucose.: 159 mg/dL (17 Apr 2025 12:17)      RADIOLOGY & ADDITIONAL TESTS:  Results Reviewed:   Imaging Personally Reviewed:  Electrocardiogram Personally Reviewed:

## 2025-04-18 NOTE — PROGRESS NOTE ADULT - NS ATTEND AMEND GEN_ALL_CORE FT
75 y/o female with PMH of ICM/HFrEF (LVEF 35-40%), CAD s/p PCI, Mobitz II s/p PPM (MD NereydaT), HTN, HLD, PAD with chronic right LE wound, DM2, and CKD3, who was BIBA from CHF clinic due to hypervolemia, weight gain of ~20 lbs in the past 9 days despite escalation of PO diuretics.      She was recently admitted on 3/19/25 with c/o CP and found to have NSTEMI. She underwent LHC on 3/21 which showed mid-distal LAD severe stenosis, OM1 and OM2 severe stenosis, mid ISR 50%, distal ISR 99%, s/p PCI with 1 ZEINAB to RCA.  On 3/22 she had a fever with leukocytosis and her blood cultures were positive for E. Coli/ESBL. Her CT A/P showed left pyelonephritis. She also had an BRADY for which her Entresto was held. She was discharged home 3/31 and her CardioMEMS was not at goal and had been rising. She was advised to increase Torsemide 40 mg daily to twice daily on 4/4. Despite this, she has continued to gain weight and reports poor UOP response.     Admission labwork: Na 125, K 4.6, BUN 37.7, Cr 1.94, eGFR 27, Mg 2.3, proBNP 33513!  CXR: increased interstitial markings bilaterally, cardiomegaly.   On 4/10 she developed a fever, tmax 100.4F. RVP negative, empirically started on IV Zosyn.  4/13 s/p right knee fluid aspiration (s/p recent trauma/fall).  4/15 initiated on continuous Bumex infusion for inadequate response to intermittent dosing.    Prior Cardiac Testing:  TTE 3/21/25: LVEF 25-30%, grade II DD, normal RV size/function, severe LAE, moderate MR, mild TR, PASP 58 mmHg ( RAP 8 mmHg), no evidence of LV thrombus, multiple segmental abnormalities/regional WMA.    CardioMEMS PAD (goal 14):  4/17: 27 (in chair ~30 degrees).  4/14: 30-33  4/11: 32  4/9: 32  3/27: 26  3/24: 23  3/21: 22        today cardiomems 27? but sitting in chair  2L output yesterday    consider increase of bumex  may consider acetazolamide    we will follow. cardiomems 4/20

## 2025-04-18 NOTE — PROGRESS NOTE ADULT - PROBLEM SELECTOR PLAN 1
- ICM, LVEF 25-30% on 3/21 (previously 35-40%). Can repeat TTE once more optimized. Likely in next 48H  - Clinically appears euvolemic on exam however this is somewhat limited by body habitus.   - Serum proBNP >68K  - Hyponatremia likely 2/2 hypervolemia - improving.  - CardioMEMS PAD slowly improving - 27 yesterday will recheck today (goal closer to 14).   - Diuretics: Continue Bumex infusion at 1mg/hr. Goal net negative 2-2.5L/day.   - Please check BMP BID. Maintain K>4, Mg >2.  - GDMT: Increase Entresto to 49-51 mg BID. C/w HDZN 25 mg TID and Toprol-XL 50 mg nightly. Plan to continue to increase ARNi and reduce HDZN if renal fx remains stable. Eventual MRA once renal function stabilizes. No Farxiga 2/2 h/o pyelonephritis.   - Device: s/p Micra PPM with chronic . Consider CRT in future  - Low Na, 1.5L FR diet recommended  - Please document strict I&Os and daily standing weight. - ICM, LVEF 25-30% on 3/21 (previously 35-40%). Can repeat TTE once more optimized. Likely in next 48H  - Clinically appears euvolemic on exam however this is somewhat limited by body habitus.   - Serum proBNP >68K  - Hyponatremia likely 2/2 hypervolemia - improving.  - CardioMEMS PAD slowly improving - 27 today will recheck today (goal closer to 14).   - Diuretics: Continue Bumex infusion at 1mg/hr. Goal net negative 2-2.5L/day.   - Please check BMP BID. Maintain K>4, Mg >2.  - GDMT: Increase Entresto to 49-51 mg BID. C/w HDZN 25 mg TID and Toprol-XL 50 mg nightly. Plan to continue to increase ARNi and reduce HDZN if renal fx remains stable. Eventual MRA once renal function stabilizes. No Farxiga 2/2 h/o pyelonephritis.   - Device: s/p Micra PPM with chronic . Consider CRT in future  - Low Na, 1.5L FR diet recommended  - Please document strict I&Os and daily standing weight.

## 2025-04-18 NOTE — PROGRESS NOTE ADULT - SUBJECTIVE AND OBJECTIVE BOX
[facility]                                                         CARDIOLOGY AT [facility]                                                                  [address] [facility] [address] [facility], [place] [address]                                                         Telephone: [phone]. Fax:[phone]                                                                             PROGRESS NOTE    Reason for follow up:  NSTEMI/HFrEF  Update: appears more euvolemic on exam, will check cardiomems today. sitting in chair, -2L in 24h,       Review of symptoms:   Cardiac:  No chest pain. No dyspnea. No palpitations.  Respiratory: no cough. No dyspnea  Gastrointestinal: No diarrhea. No abdominal pain. No bleeding.   Neuro: No focal neuro complaints.    Vitals:  T(C): 36.6 (04-18-25 @ 07:40), Max: 36.9 (04-17-25 @ 12:00)  HR: 65 (04-18-25 @ 07:40) (60 - 67)  BP: 143/70 (04-18-25 @ 07:40) (132/67 - 147/73)  RR: 18 (04-18-25 @ 07:40) (18 - 18)  SpO2: 96% (04-18-25 @ 07:40) (95% - 100%)  Wt(kg): --  I&O's Summary    17 Apr 2025 07:01  -  18 Apr 2025 07:00  --------------------------------------------------------  IN: 1160 mL / OUT: 3200 mL / NET: -2040 mL          PHYSICAL EXAM:  Appearance: Comfortable. No acute distress  HEENT:  Atraumatic. Normocephalic.  Normal oral mucosa  Neurologic: A & O x 3, no gross focal deficits.  Cardiovascular: RRR S1 S2, No murmur, no rubs/gallops. No JVD  Respiratory: diminsihed  Gastrointestinal:  Soft, Non-tender, + BS  Lower Extremities: 2+ Peripheral Pulses, No clubbing, cyanosis, + RLE edema  Psychiatry: Patient is calm. No agitation.   Skin: warm and dry.    CURRENT CARDIAC MEDICATIONS:  buMETAnide Infusion 1 mG/Hr IV Continuous <Continuous>  hydrALAZINE 25 milliGRAM(s) Oral three times a day  metoprolol succinate ER 50 milliGRAM(s) Oral daily  sacubitril 49 mG/valsartan 51 mG 1 Tablet(s) Oral two times a day      CURRENT OTHER MEDICATIONS:  acetaminophen     Tablet .. 975 milliGRAM(s) Oral every 8 hours  gabapentin 400 milliGRAM(s) Oral daily  meclizine 12.5 milliGRAM(s) Oral two times a day  methocarbamol 500 milliGRAM(s) Oral two times a day  oxyCODONE    IR 5 milliGRAM(s) Oral every 6 hours PRN Severe Pain (7 - 10)  sertraline 25 milliGRAM(s) Oral daily  aluminum hydroxide/magnesium hydroxide/simethicone Suspension 30 milliLiter(s) Oral every 4 hours PRN Dyspepsia  loperamide 2 milliGRAM(s) Oral two times a day PRN Diarrhea  pantoprazole    Tablet 40 milliGRAM(s) Oral before breakfast  atorvastatin 80 milliGRAM(s) Oral at bedtime  dextrose 50% Injectable 25 Gram(s) IV Push once, Stop order after: 1 Doses  dextrose 50% Injectable 12.5 Gram(s) IV Push once, Stop order after: 1 Doses  dextrose 50% Injectable 25 Gram(s) IV Push once, Stop order after: 1 Doses  dextrose Oral Gel 15 Gram(s) Oral once, Stop order after: 1 Doses PRN Blood Glucose LESS THAN 70 milliGRAM(s)/deciliter  glucagon  Injectable 1 milliGRAM(s) IntraMuscular once, Stop order after: 1 Doses  insulin glargine Injectable (LANTUS) 5 Unit(s) SubCutaneous at bedtime  insulin lispro (ADMELOG) corrective regimen sliding scale   SubCutaneous three times a day before meals  aspirin  chewable 81 milliGRAM(s) Oral daily  dextrose 5%. 1000 milliLiter(s) (50 mL/Hr) IV Continuous <Continuous>  dextrose 5%. 1000 milliLiter(s) (100 mL/Hr) IV Continuous <Continuous>  lidocaine   4% Patch 1 Patch Transdermal every 24 hours  ticagrelor 60 milliGRAM(s) Oral every 12 hours      LABS:	 	                            9.3    9.20  )-----------( 477      ( 18 Apr 2025 05:30 )             29.0     04-18    137  |  101  |  27.2[H]  ----------------------------<  116[H]  4.1   |  22.0  |  1.83[H]    Ca    7.5[L]      18 Apr 2025 05:30  Phos  3.2     04-18  Mg     2.2     04-18      PT/INR/PTT ( 21 Mar 2025 04:48 )                       :                       :      X            :       61.4                  .        .                   .              .           .       X           .                                       Lipid Profile: Date: 03-22 @ 04:45  Total cholesterol 120; Direct LDL: --; HDL: 34; Triglycerides:154    HgA1c:   TSH:     TELEMETRY: V paced  ECG:    DIAGNOSTIC TESTING:  [ ] Echocardiogram:   < from: TTE W or WO Ultrasound Enhancing Agent (03.21.25 @ 17:56) >    TRANSTHORACIC ECHOCARDIOGRAM REPORT  ________________________________________________________________________________                                      _______       Pt. Name:       SHIREEN CASH  Study Date:    3/21/2025  MRN:            [id number]  YOB: 1950  Accession #:    [id number]   Age:           74 years  Account#:       [id number]  Gender:        F  Heart Rate:     63 bpm      Height:        51.00 in (129.54 cm)  Rhythm:                     Weight:        156.20 lb (70.85 kg)  Blood Pressure: 149/74 mmHg BSA/BMI:       1.49 m² / 42.22 kg/m²  ________________________________________________________________________________________  Referring Physician:    [id number] Liam Raymond  Interpreting Physician: Jim Wilkinson DO  Primary Sonographer:    Nichol Aguiar    CPT:                ECHO TTE WITH CON COMP W DOPP - .m;DEFINITY ECHO                      CONTRAST PER ML - .m  Indication(s):      Chest pain, unspecified - R07.9  Procedure:          Transthoracicechocardiogram with 2-D, M-mode and complete                      spectral and color flow Doppler.  Ordering Location:  [facility]  Admission Status:   Inpatient  Contrast Injection: Verbal consent was obtained for injection of Ultrasonic     Enhancing Agent following a discussion of risks and                      benefits.                      Endocardial visualization enhanced with 2 ml of Definity                      Ultrasound enhancing agent (Lot#:[id number] Exp.Date:12/2025).  NDC Number(s):      [id number]  UEA Reaction:       Patient had a headache after injection of Ultrasound                      Enhancing Agent.  Study Information:  Image quality for this study is technically difficult.    _______________________________________________________________________________________     CONCLUSIONS:      1. Technically difficult image quality.   2. Left ventricular cavity is normal in size. Left ventricular systolic function is moderately decreased with an ejection fraction of 31 % by Taylor's method of disks with an ejection fraction visually estimated at 25 to 30 %. Regional wall motion abnormalities present.   3. Multiple segmental abnormalities exist. See findings.   4. There is moderate (grade 2) left ventricular diastolic dysfunction, with elevated left ventricular filling pressure.   5. There is no evidence of a left ventricular thrombus.   6. Normal right ventricular cavity size and normal right ventricular systolic function.   7. Left atrium is severely dilated.   8. Moderate mitral regurgitation.   9. Mild tricuspid regurgitation.  10. Estimated pulmonary artery systolic pressure is 58 mmHg.  11. The inferior vena cava is normal in size measuring 1.10 cm in diameter, (normal <2.1cm) with abnormal inspiratory collapse (abnormal <50%) consistent with mildly elevated right atrial pressure (~8, range 5-10mmHg).  12. No pericardial effusion seen.  13. Compared to the transthoracic echocardiogram performed on 10/8/2024, further reduction in LVEF seen and PASP has increased.    < end of copied text >  [ ]  Catheterization:  [ ] Stress Test:    OTHER: 	                                                                [facility]                                                         CARDIOLOGY AT [facility]                                                                  [address], [place] [address]                                                         Telephone: [phone]. Fax:[phone]                                                                             PROGRESS NOTE    Reason for follow up:  NSTEMI/HFrEF  Update: appears more euvolemic on exam, cardiomems 27 today. sitting in chair, -2L in 24h,       Review of symptoms:   Cardiac:  No chest pain. No dyspnea. No palpitations.  Respiratory: no cough. No dyspnea  Gastrointestinal: No diarrhea. No abdominal pain. No bleeding.   Neuro: No focal neuro complaints.    Vitals:  T(C): 36.6 (04-18-25 @ 07:40), Max: 36.9 (04-17-25 @ 12:00)  HR: 65 (04-18-25 @ 07:40) (60 - 67)  BP: 143/70 (04-18-25 @ 07:40) (132/67 - 147/73)  RR: 18 (04-18-25 @ 07:40) (18 - 18)  SpO2: 96% (04-18-25 @ 07:40) (95% - 100%)  Wt(kg): --  I&O's Summary    17 Apr 2025 07:01  -  18 Apr 2025 07:00  --------------------------------------------------------  IN: 1160 mL / OUT: 3200 mL / NET: -2040 mL          PHYSICAL EXAM:  Appearance: Comfortable. No acute distress  HEENT:  Atraumatic. Normocephalic.  Normal oral mucosa  Neurologic: A & O x 3, no gross focal deficits.  Cardiovascular: RRR S1 S2, No murmur, no rubs/gallops. No JVD  Respiratory: diminsihed  Gastrointestinal:  Soft, Non-tender, + BS  Lower Extremities: 2+ Peripheral Pulses, No clubbing, cyanosis, + RLE edema  Psychiatry: Patient is calm. No agitation.   Skin: warm and dry.    CURRENT CARDIAC MEDICATIONS:  buMETAnide Infusion 1 mG/Hr IV Continuous <Continuous>  hydrALAZINE 25 milliGRAM(s) Oral three times a day  metoprolol succinate ER 50 milliGRAM(s) Oral daily  sacubitril 49 mG/valsartan 51 mG 1 Tablet(s) Oral two times a day      CURRENT OTHER MEDICATIONS:  acetaminophen     Tablet .. 975 milliGRAM(s) Oral every 8 hours  gabapentin 400 milliGRAM(s) Oral daily  meclizine 12.5 milliGRAM(s) Oral two times a day  methocarbamol 500 milliGRAM(s) Oral two times a day  oxyCODONE    IR 5 milliGRAM(s) Oral every 6 hours PRN Severe Pain (7 - 10)  sertraline 25 milliGRAM(s) Oral daily  aluminum hydroxide/magnesium hydroxide/simethicone Suspension 30 milliLiter(s) Oral every 4 hours PRN Dyspepsia  loperamide 2 milliGRAM(s) Oral two times a day PRN Diarrhea  pantoprazole    Tablet 40 milliGRAM(s) Oral before breakfast  atorvastatin 80 milliGRAM(s) Oral at bedtime  dextrose 50% Injectable 25 Gram(s) IV Push once, Stop order after: 1 Doses  dextrose 50% Injectable 12.5 Gram(s) IV Push once, Stop order after: 1 Doses  dextrose 50% Injectable 25 Gram(s) IV Push once, Stop order after: 1 Doses  dextrose Oral Gel 15 Gram(s) Oral once, Stop order after: 1 Doses PRN Blood Glucose LESS THAN 70 milliGRAM(s)/deciliter  glucagon  Injectable 1 milliGRAM(s) IntraMuscular once, Stop order after: 1 Doses  insulin glargine Injectable (LANTUS) 5 Unit(s) SubCutaneous at bedtime  insulin lispro (ADMELOG) corrective regimen sliding scale   SubCutaneous three times a day before meals  aspirin  chewable 81 milliGRAM(s) Oral daily  dextrose 5%. 1000 milliLiter(s) (50 mL/Hr) IV Continuous <Continuous>  dextrose 5%. 1000 milliLiter(s) (100 mL/Hr) IV Continuous <Continuous>  lidocaine   4% Patch 1 Patch Transdermal every 24 hours  ticagrelor 60 milliGRAM(s) Oral every 12 hours      LABS:	 	                            9.3    9.20  )-----------( 477      ( 18 Apr 2025 05:30 )             29.0     04-18    137  |  101  |  27.2[H]  ----------------------------<  116[H]  4.1   |  22.0  |  1.83[H]    Ca    7.5[L]      18 Apr 2025 05:30  Phos  3.2     04-18  Mg     2.2     04-18      PT/INR/PTT ( 21 Mar 2025 04:48 )                       :                       :      X            :       61.4                  .        .                   .              .           .       X           .                                       Lipid Profile: Date: 03-22 @ 04:45  Total cholesterol 120; Direct LDL: --; HDL: 34; Triglycerides:154    HgA1c:   TSH:     TELEMETRY: V paced  ECG:    DIAGNOSTIC TESTING:  [ ] Echocardiogram:   < from: TTE W or WO Ultrasound Enhancing Agent (03.21.25 @ 17:56) >    TRANSTHORACIC ECHOCARDIOGRAM REPORT  ________________________________________________________________________________                                      _______       Pt. Name:       SHIREEN CASH  Study Date:    3/21/2025  MRN:            [id number]  YOB: 1950  Accession #:    [id number]   Age:           74 years  Account#:       [id number]  Gender:        F  Heart Rate:     63 bpm      Height:        51.00 in (129.54 cm)  Rhythm:                     Weight:        156.20 lb (70.85 kg)  Blood Pressure: 149/74 mmHg BSA/BMI:       1.49 m² / 42.22 kg/m²  ________________________________________________________________________________________  Referring Physician:    [id number] Liam Raymond  Interpreting Physician: Jim Wilkinson DO  Primary Sonographer:    Nichol Aguiar    CPT:                ECHO TTE WITH CON COMP W DOPP - .m;DEFINITY ECHO                      CONTRAST PER ML - .m  Indication(s):      Chest pain, unspecified - R07.9  Procedure:          Transthoracicechocardiogram with 2-D, M-mode and complete                      spectral and color flow Doppler.  Ordering Location:  [facility]  Admission Status:   Inpatient  Contrast Injection: Verbal consent was obtained for injection of Ultrasonic     Enhancing Agent following a discussion of risks and                      benefits.                      Endocardial visualization enhanced with 2 ml of Definity                      Ultrasound enhancing agent (Lot#:[id number] Exp.Date:12/2025).  NDC Number(s):      [id number]  UEA Reaction:       Patient had a headache after injection of Ultrasound                      Enhancing Agent.  Study Information:  Image quality for this study is technically difficult.    _______________________________________________________________________________________     CONCLUSIONS:      1. Technically difficult image quality.   2. Left ventricular cavity is normal in size. Left ventricular systolic function is moderately decreased with an ejection fraction of 31 % by Taylor's method of disks with an ejection fraction visually estimated at 25 to 30 %. Regional wall motion abnormalities present.   3. Multiple segmental abnormalities exist. See findings.   4. There is moderate (grade 2) left ventricular diastolic dysfunction, with elevated left ventricular filling pressure.   5. There is no evidence of a left ventricular thrombus.   6. Normal right ventricular cavity size and normal right ventricular systolic function.   7. Left atrium is severely dilated.   8. Moderate mitral regurgitation.   9. Mild tricuspid regurgitation.  10. Estimated pulmonary artery systolic pressure is 58 mmHg.  11. The inferior vena cava is normal in size measuring 1.10 cm in diameter, (normal <2.1cm) with abnormal inspiratory collapse (abnormal <50%) consistent with mildly elevated right atrial pressure (~8, range 5-10mmHg).  12. No pericardial effusion seen.  13. Compared to the transthoracic echocardiogram performed on 10/8/2024, further reduction in LVEF seen and PASP has increased.    < end of copied text >  [ ]  Catheterization:  [ ] Stress Test:    OTHER:

## 2025-04-18 NOTE — PROGRESS NOTE ADULT - ASSESSMENT
Gen Cards: Dr. Allen  HF: Dr. Polanco    73 y/o female with PMH of ICM/HFrEF (LVEF 35-40%), CAD s/p PCI, Mobitz II s/p PPM (MIRNA Dawn), HTN, HLD, PAD with chronic right LE wound, DM2, and CKD3, who was BIBA from CHF clinic due to hypervolemia, weight gain of ~20 lbs in the past 9 days despite escalation of PO diuretics.      She was recently admitted on 3/19/25 with c/o CP and found to have NSTEMI. She underwent LHC on 3/21 which showed mid-distal LAD severe stenosis, OM1 and OM2 severe stenosis, mid ISR 50%, distal ISR 99%, s/p PCI with 1 ZEINAB to RCA.  On 3/22 she had a fever with leukocytosis and her blood cultures were positive for E. Coli/ESBL. Her CT A/P showed left pyelonephritis. She also had an BRADY for which her Entresto was held. She was discharged home 3/31 and her CardioMEMS was not at goal and had been rising. She was advised to increase Torsemide 40 mg daily to twice daily on 4/4. Despite this, she has continued to gain weight and reports poor UOP response.     Admission labwork: Na 125, K 4.6, BUN 37.7, Cr 1.94, eGFR 27, Mg 2.3, proBNP 56207!  CXR: increased interstitial markings bilaterally, cardiomegaly.   On 4/10 she developed a fever, tmax 100.4F. RVP negative, empirically started on IV Zosyn.  4/13 s/p right knee fluid aspiration (s/p recent trauma/fall).  4/15 initiated on continuous Bumex infusion for inadequate response to intermittent dosing.    Prior Cardiac Testing:  TTE 3/21/25: LVEF 25-30%, grade II DD, normal RV size/function, severe LAE, moderate MR, mild TR, PASP 58 mmHg ( RAP 8 mmHg), no evidence of LV thrombus, multiple segmental abnormalities/regional WMA.    CardioMEMS PAD (goal 14):  4/17: 27 (in chair ~30 degrees).  4/14: 30-33  4/11: 32  4/9: 32  3/27: 26  3/24: 23  3/21: 22

## 2025-04-19 LAB
ANION GAP SERPL CALC-SCNC: 17 MMOL/L — SIGNIFICANT CHANGE UP (ref 5–17)
BUN SERPL-MCNC: 29.9 MG/DL — HIGH (ref 8–20)
CALCIUM SERPL-MCNC: 8 MG/DL — LOW (ref 8.4–10.5)
CHLORIDE SERPL-SCNC: 100 MMOL/L — SIGNIFICANT CHANGE UP (ref 96–108)
CO2 SERPL-SCNC: 20 MMOL/L — LOW (ref 22–29)
CREAT SERPL-MCNC: 1.79 MG/DL — HIGH (ref 0.5–1.3)
EGFR: 30 ML/MIN/1.73M2 — LOW
EGFR: 30 ML/MIN/1.73M2 — LOW
GLUCOSE BLDC GLUCOMTR-MCNC: 157 MG/DL — HIGH (ref 70–99)
GLUCOSE BLDC GLUCOMTR-MCNC: 162 MG/DL — HIGH (ref 70–99)
GLUCOSE BLDC GLUCOMTR-MCNC: 176 MG/DL — HIGH (ref 70–99)
GLUCOSE BLDC GLUCOMTR-MCNC: 214 MG/DL — HIGH (ref 70–99)
GLUCOSE SERPL-MCNC: 147 MG/DL — HIGH (ref 70–99)
HCT VFR BLD CALC: 27.9 % — LOW (ref 34.5–45)
HGB BLD-MCNC: 9.1 G/DL — LOW (ref 11.5–15.5)
MCHC RBC-ENTMCNC: 28.9 PG — SIGNIFICANT CHANGE UP (ref 27–34)
MCHC RBC-ENTMCNC: 32.6 G/DL — SIGNIFICANT CHANGE UP (ref 32–36)
MCV RBC AUTO: 88.6 FL — SIGNIFICANT CHANGE UP (ref 80–100)
NRBC # BLD AUTO: 0 K/UL — SIGNIFICANT CHANGE UP (ref 0–0)
NRBC # FLD: 0 K/UL — SIGNIFICANT CHANGE UP (ref 0–0)
NRBC BLD AUTO-RTO: 0 /100 WBCS — SIGNIFICANT CHANGE UP (ref 0–0)
PLATELET # BLD AUTO: 444 K/UL — HIGH (ref 150–400)
PMV BLD: 9.2 FL — SIGNIFICANT CHANGE UP (ref 7–13)
POTASSIUM SERPL-MCNC: 3.8 MMOL/L — SIGNIFICANT CHANGE UP (ref 3.5–5.3)
POTASSIUM SERPL-SCNC: 3.8 MMOL/L — SIGNIFICANT CHANGE UP (ref 3.5–5.3)
RBC # BLD: 3.15 M/UL — LOW (ref 3.8–5.2)
RBC # FLD: 18 % — HIGH (ref 10.3–14.5)
SODIUM SERPL-SCNC: 137 MMOL/L — SIGNIFICANT CHANGE UP (ref 135–145)
WBC # BLD: 9.51 K/UL — SIGNIFICANT CHANGE UP (ref 3.8–10.5)
WBC # FLD AUTO: 9.51 K/UL — SIGNIFICANT CHANGE UP (ref 3.8–10.5)

## 2025-04-19 PROCEDURE — 99233 SBSQ HOSP IP/OBS HIGH 50: CPT

## 2025-04-19 PROCEDURE — 93010 ELECTROCARDIOGRAM REPORT: CPT

## 2025-04-19 PROCEDURE — 71045 X-RAY EXAM CHEST 1 VIEW: CPT | Mod: 26

## 2025-04-19 RX ADMIN — Medication 25 MILLIGRAM(S): at 21:36

## 2025-04-19 RX ADMIN — INSULIN LISPRO 2: 100 INJECTION, SOLUTION INTRAVENOUS; SUBCUTANEOUS at 12:33

## 2025-04-19 RX ADMIN — TICAGRELOR 60 MILLIGRAM(S): 90 TABLET ORAL at 06:01

## 2025-04-19 RX ADMIN — Medication 81 MILLIGRAM(S): at 10:52

## 2025-04-19 RX ADMIN — SACUBITRIL AND VALSARTAN 1 TABLET(S): 6; 6 PELLET ORAL at 17:50

## 2025-04-19 RX ADMIN — Medication 975 MILLIGRAM(S): at 06:02

## 2025-04-19 RX ADMIN — Medication 12.5 MILLIGRAM(S): at 06:02

## 2025-04-19 RX ADMIN — LIDOCAINE HYDROCHLORIDE 1 PATCH: 20 JELLY TOPICAL at 18:59

## 2025-04-19 RX ADMIN — BUMETANIDE 5 MG/HR: 1 TABLET ORAL at 13:37

## 2025-04-19 RX ADMIN — INSULIN LISPRO 4: 100 INJECTION, SOLUTION INTRAVENOUS; SUBCUTANEOUS at 17:56

## 2025-04-19 RX ADMIN — METHOCARBAMOL 500 MILLIGRAM(S): 500 TABLET, FILM COATED ORAL at 17:50

## 2025-04-19 RX ADMIN — LIDOCAINE HYDROCHLORIDE 1 PATCH: 20 JELLY TOPICAL at 22:00

## 2025-04-19 RX ADMIN — Medication 1 MILLIGRAM(S): at 23:09

## 2025-04-19 RX ADMIN — SERTRALINE 25 MILLIGRAM(S): 100 TABLET, FILM COATED ORAL at 10:52

## 2025-04-19 RX ADMIN — SACUBITRIL AND VALSARTAN 1 TABLET(S): 6; 6 PELLET ORAL at 06:03

## 2025-04-19 RX ADMIN — Medication 975 MILLIGRAM(S): at 22:36

## 2025-04-19 RX ADMIN — Medication 30 MILLILITER(S): at 13:33

## 2025-04-19 RX ADMIN — Medication 40 MILLIGRAM(S): at 06:02

## 2025-04-19 RX ADMIN — LIDOCAINE HYDROCHLORIDE 1 PATCH: 20 JELLY TOPICAL at 00:00

## 2025-04-19 RX ADMIN — Medication 25 MILLIGRAM(S): at 06:02

## 2025-04-19 RX ADMIN — Medication 975 MILLIGRAM(S): at 14:35

## 2025-04-19 RX ADMIN — METHOCARBAMOL 500 MILLIGRAM(S): 500 TABLET, FILM COATED ORAL at 06:02

## 2025-04-19 RX ADMIN — GABAPENTIN 400 MILLIGRAM(S): 400 CAPSULE ORAL at 10:52

## 2025-04-19 RX ADMIN — ATORVASTATIN CALCIUM 80 MILLIGRAM(S): 80 TABLET, FILM COATED ORAL at 21:36

## 2025-04-19 RX ADMIN — Medication 30 MILLILITER(S): at 08:07

## 2025-04-19 RX ADMIN — Medication 975 MILLIGRAM(S): at 13:35

## 2025-04-19 RX ADMIN — TICAGRELOR 60 MILLIGRAM(S): 90 TABLET ORAL at 17:50

## 2025-04-19 RX ADMIN — Medication 975 MILLIGRAM(S): at 21:36

## 2025-04-19 RX ADMIN — METOPROLOL SUCCINATE 50 MILLIGRAM(S): 50 TABLET, EXTENDED RELEASE ORAL at 06:02

## 2025-04-19 RX ADMIN — INSULIN GLARGINE-YFGN 5 UNIT(S): 100 INJECTION, SOLUTION SUBCUTANEOUS at 21:36

## 2025-04-19 RX ADMIN — LIDOCAINE HYDROCHLORIDE 1 PATCH: 20 JELLY TOPICAL at 10:52

## 2025-04-19 RX ADMIN — Medication 30 MILLILITER(S): at 21:36

## 2025-04-19 RX ADMIN — Medication 25 MILLIGRAM(S): at 13:35

## 2025-04-19 RX ADMIN — INSULIN LISPRO 2: 100 INJECTION, SOLUTION INTRAVENOUS; SUBCUTANEOUS at 08:54

## 2025-04-19 NOTE — PROGRESS NOTE ADULT - ASSESSMENT
Gen Cards: Dr. Allen  HF: Dr. Polanco    73 y/o female with PMH of ICM/HFrEF (LVEF 35-40%), CAD s/p PCI, Mobitz II s/p PPM (MIRNA Dawn), HTN, HLD, PAD with chronic right LE wound, DM2, and CKD3, who was BIBA from CHF clinic due to hypervolemia, weight gain of ~20 lbs in the past 9 days despite escalation of PO diuretics.      She was recently admitted on 3/19/25 with c/o CP and found to have NSTEMI. She underwent LHC on 3/21 which showed mid-distal LAD severe stenosis, OM1 and OM2 severe stenosis, mid ISR 50%, distal ISR 99%, s/p PCI with 1 ZEINAB to RCA.  On 3/22 she had a fever with leukocytosis and her blood cultures were positive for E. Coli/ESBL. Her CT A/P showed left pyelonephritis. She also had an BRADY for which her Entresto was held. She was discharged home 3/31 and her CardioMEMS was not at goal and had been rising. She was advised to increase Torsemide 40 mg daily to twice daily on 4/4. Despite this, she has continued to gain weight and reports poor UOP response.     Admission labwork: Na 125, K 4.6, BUN 37.7, Cr 1.94, eGFR 27, Mg 2.3, proBNP 57420!  CXR: increased interstitial markings bilaterally, cardiomegaly.   On 4/10 she developed a fever, tmax 100.4F. RVP negative, empirically started on IV Zosyn.  4/13 s/p right knee fluid aspiration (s/p recent trauma/fall).  4/15 initiated on continuous Bumex infusion for inadequate response to intermittent dosing.    Prior Cardiac Testing:  TTE 3/21/25: LVEF 25-30%, grade II DD, normal RV size/function, severe LAE, moderate MR, mild TR, PASP 58 mmHg ( RAP 8 mmHg), no evidence of LV thrombus, multiple segmental abnormalities/regional WMA.    CardioMEMS PAD (goal 14):  4/17: 27 (in chair ~30 degrees).  4/14: 30-33  4/11: 32  4/9: 32  3/27: 26  3/24: 23  3/21: 22

## 2025-04-19 NOTE — PROGRESS NOTE ADULT - ASSESSMENT
74yoF with a history of coronary artery disease, heart failure, chronic kidney disease, hypertension, diabetes, and anemia who was recently hospitalized for NSTEMI with PCI and bacteremia who presented with dyspnea on exertion and weight gain. Continues on Bumex drip as per cardiology, following strict I/O/ Mems data.     #Acute on chronic systolic heart failure  - Recent echocardiogram noted ejection fraction of 31% with multiple segmental abnormalities  - c/w IV bumex per HF  - Strict in and out, goal net negative 2-2.5 L daily   - Daily weights  - On metoprolol  - Heart Failure on board  - pt with reproducible chest wall pain, started on pain regimen, now pain resolved    #Right knee pain  - knee xray showing effusion, possible in setting of fall  - pain regimen adjusted  - CT knee result noted  - ortho consulted, sp aspiration fluid neg for crystals/infections  ROM improving     #Fever  - CXR noted with ?Atelectasis vs edema vs infx  - sp Zosyn x 5 days   - swallow eval result noted, diet advanced   - Bcx NGTD    #Diarrhea  - possibly abx related  - GI pcr neg, PRN imodium started    #Coronary artery disease  - Continue on aspirin, ticagrelor, and atorvastatin  - Recent cardiac catheterization with PCI  - initial trop 140 -> 118  - pt with recurrent chest pain, high up in chest, trop downtrended   - given maalox as well     #Chronic kidney disease  - Renal function improved from prior values  - Continue to monitor while on diuresis    #Hyponatremia  improved   - Hypervolemic on presentation  - Bumetanide for diuresis    #Diabetes  - Insulin coverage  - Close monitoring of blood glucose levels  - Gabapentin for neuropathy  - need Glucometer on discharge     DVT ppx: ASA/Brilinta   Dispo: Pending HF optimization and weaning off Bumex drip   PT eval - LALA, but family prefers home

## 2025-04-19 NOTE — PROGRESS NOTE ADULT - SUBJECTIVE AND OBJECTIVE BOX
[facility]                                                         CARDIOLOGY AT [address], [place] [address]                                                         Telephone: [phone]. Fax:[phone]                                                                             PROGRESS NOTE      Reason for follow up: Heart failure     Review of symptoms:   Cardiac:  No chest pain. No dyspnea. No palpitations.  Respiratory: no cough. No dyspnea  Gastrointestinal: No diarrhea. No abdominal pain. No bleeding.   Neuro: No focal neuro complaints.  All other ROS negative unless otherwise listed above    PHYSICAL EXAM:  Appearance: Comfortable. No acute distress  HEENT:  Atraumatic. Normocephalic.  Normal oral mucosa  Neurologic: A & O x 3, no gross focal deficits.  Cardiovascular: RRR S1 S2, No murmur, no rubs/gallops. No JVD  Respiratory: Lungs clear to auscultation, unlabored   Gastrointestinal:  Soft, Non-tender, + BS  Lower Extremities: 2+ Peripheral Pulses, No clubbing, cyanosis, or edema  Psychiatry: Patient is calm. No agitation.   Skin: warm and dry.    Vitals:  T(C): 36.9 (04-19-25 @ 11:45), Max: 37.1 (04-18-25 @ 20:59)  HR: 63 (04-19-25 @ 11:45) (60 - 78)  BP: 160/78 (04-19-25 @ 11:45) (136/72 - 160/78)  RR: 18 (04-19-25 @ 11:45) (17 - 18)  SpO2: 100% (04-19-25 @ 11:45) (96% - 100%)  Wt(kg): --  I&O's Summary    18 Apr 2025 07:01  -  19 Apr 2025 07:00  --------------------------------------------------------  IN: 1020 mL / OUT: 2400 mL / NET: -1380 mL    19 Apr 2025 07:01  -  19 Apr 2025 11:54  --------------------------------------------------------  IN: 0 mL / OUT: 350 mL / NET: -350 mL          CURRENT CARDIAC MEDICATIONS:  buMETAnide Infusion 1 mG/Hr IV Continuous <Continuous>  hydrALAZINE 25 milliGRAM(s) Oral three times a day  metoprolol succinate ER 50 milliGRAM(s) Oral daily  sacubitril 49 mG/valsartan 51 mG 1 Tablet(s) Oral two times a day      CURRENT OTHER MEDICATIONS:  acetaminophen     Tablet .. 975 milliGRAM(s) Oral every 8 hours  gabapentin 400 milliGRAM(s) Oral daily  methocarbamol 500 milliGRAM(s) Oral two times a day  oxyCODONE    IR 5 milliGRAM(s) Oral every 6 hours PRN Severe Pain (7 - 10)  sertraline 25 milliGRAM(s) Oral daily  aluminum hydroxide/magnesium hydroxide/simethicone Suspension 30 milliLiter(s) Oral every 4 hours PRN Dyspepsia  loperamide 2 milliGRAM(s) Oral two times a day PRN Diarrhea  pantoprazole    Tablet 40 milliGRAM(s) Oral before breakfast  atorvastatin 80 milliGRAM(s) Oral at bedtime  dextrose 50% Injectable 25 Gram(s) IV Push once, Stop order after: 1 Doses  dextrose 50% Injectable 12.5 Gram(s) IV Push once, Stop order after: 1 Doses  dextrose 50% Injectable 25 Gram(s) IV Push once, Stop order after: 1 Doses  dextrose Oral Gel 15 Gram(s) Oral once, Stop order after: 1 Doses PRN Blood Glucose LESS THAN 70 milliGRAM(s)/deciliter  glucagon  Injectable 1 milliGRAM(s) IntraMuscular once, Stop order after: 1 Doses  insulin glargine Injectable (LANTUS) 5 Unit(s) SubCutaneous at bedtime  insulin lispro (ADMELOG) corrective regimen sliding scale   SubCutaneous three times a day before meals  aspirin  chewable 81 milliGRAM(s) Oral daily  dextrose 5%. 1000 milliLiter(s) (50 mL/Hr) IV Continuous <Continuous>  dextrose 5%. 1000 milliLiter(s) (100 mL/Hr) IV Continuous <Continuous>  lidocaine   4% Patch 1 Patch Transdermal every 24 hours  ticagrelor 60 milliGRAM(s) Oral every 12 hours      LABS:	 	                            9.1    9.51  )-----------( 444      ( 19 Apr 2025 05:03 )             27.9     04-19    137  |  100  |  29.9[H]  ----------------------------<  147[H]  3.8   |  20.0[L]  |  1.79[H]    Ca    8.0[L]      19 Apr 2025 05:03  Phos  3.2     04-18  Mg     2.2     04-18      PT/INR/PTT ( 21 Mar 2025 04:48 )                       :                       :      X            :       61.4                  .        .                   .              .           .       X           .                                       Lipid Profile:   HgA1c:   TSH:

## 2025-04-19 NOTE — PROGRESS NOTE ADULT - SUBJECTIVE AND OBJECTIVE BOX
[facility] Division of Hospital Medicine    Chief Complaint:      SUBJECTIVE / OVERNIGHT EVENTS:    Patient seen and examined at bedside, no acute events overnight, patient denies any new complaints. Continues on Bumex infusion, monitoring I/O's, following mems data / cardiac input.     MEDICATIONS  (STANDING):  acetaminophen     Tablet .. 975 milliGRAM(s) Oral every 8 hours  aspirin  chewable 81 milliGRAM(s) Oral daily  atorvastatin 80 milliGRAM(s) Oral at bedtime  buMETAnide Infusion 1 mG/Hr (5 mL/Hr) IV Continuous <Continuous>  dextrose 5%. 1000 milliLiter(s) (50 mL/Hr) IV Continuous <Continuous>  dextrose 5%. 1000 milliLiter(s) (100 mL/Hr) IV Continuous <Continuous>  dextrose 50% Injectable 25 Gram(s) IV Push once  dextrose 50% Injectable 12.5 Gram(s) IV Push once  dextrose 50% Injectable 25 Gram(s) IV Push once  gabapentin 400 milliGRAM(s) Oral daily  glucagon  Injectable 1 milliGRAM(s) IntraMuscular once  hydrALAZINE 25 milliGRAM(s) Oral three times a day  insulin glargine Injectable (LANTUS) 5 Unit(s) SubCutaneous at bedtime  insulin lispro (ADMELOG) corrective regimen sliding scale   SubCutaneous three times a day before meals  lidocaine   4% Patch 1 Patch Transdermal every 24 hours  methocarbamol 500 milliGRAM(s) Oral two times a day  metoprolol succinate ER 50 milliGRAM(s) Oral daily  pantoprazole    Tablet 40 milliGRAM(s) Oral before breakfast  sacubitril 49 mG/valsartan 51 mG 1 Tablet(s) Oral two times a day  sertraline 25 milliGRAM(s) Oral daily  ticagrelor 60 milliGRAM(s) Oral every 12 hours    MEDICATIONS  (PRN):  aluminum hydroxide/magnesium hydroxide/simethicone Suspension 30 milliLiter(s) Oral every 4 hours PRN Dyspepsia  dextrose Oral Gel 15 Gram(s) Oral once PRN Blood Glucose LESS THAN 70 milliGRAM(s)/deciliter  loperamide 2 milliGRAM(s) Oral two times a day PRN Diarrhea  oxyCODONE    IR 5 milliGRAM(s) Oral every 6 hours PRN Severe Pain (7 - 10)        I&O's Summary    18 Apr 2025 07:01  -  19 Apr 2025 07:00  --------------------------------------------------------  IN: 1020 mL / OUT: 2400 mL / NET: -1380 mL        PHYSICAL EXAM:  Vital Signs Last 24 Hrs  T(C): 36.8 (19 Apr 2025 08:02), Max: 37.1 (18 Apr 2025 20:59)  T(F): 98.2 (19 Apr 2025 08:02), Max: 98.8 (18 Apr 2025 20:59)  HR: 73 (19 Apr 2025 08:02) (60 - 78)  BP: 151/77 (19 Apr 2025 08:02) (136/72 - 156/74)  BP(mean): --  RR: 17 (19 Apr 2025 08:02) (17 - 18)  SpO2: 98% (19 Apr 2025 08:02) (96% - 98%)    Parameters below as of 19 Apr 2025 08:02  Patient On (Oxygen Delivery Method): room air      CONSTITUTIONAL: NAD, on NC  RESPIRATORY: Normal respiratory effort; lungs are clear to auscultation bilaterally  CARDIOVASCULAR: Regular rate and rhythm, no edema  ABDOMEN: Nontender to palpation, normoactive bowel sounds  MSK: ROM wnl in extremities x4, R. knee improved     LABS:                        9.1    9.51  )-----------( 444      ( 19 Apr 2025 05:03 )             27.9     04-19    137  |  100  |  29.9[H]  ----------------------------<  147[H]  3.8   |  20.0[L]  |  1.79[H]    Ca    8.0[L]      19 Apr 2025 05:03  Phos  3.2     04-18  Mg     2.2     04-18            Urinalysis Basic - ( 19 Apr 2025 05:03 )    Color: x / Appearance: x / SG: x / pH: x  Gluc: 147 mg/dL / Ketone: x  / Bili: x / Urobili: x   Blood: x / Protein: x / Nitrite: x   Leuk Esterase: x / RBC: x / WBC x   Sq Epi: x / Non Sq Epi: x / Bacteria: x        CAPILLARY BLOOD GLUCOSE      POCT Blood Glucose.: 176 mg/dL (19 Apr 2025 08:14)  POCT Blood Glucose.: 229 mg/dL (18 Apr 2025 21:02)  POCT Blood Glucose.: 146 mg/dL (18 Apr 2025 17:26)  POCT Blood Glucose.: 159 mg/dL (18 Apr 2025 11:55)        RADIOLOGY & ADDITIONAL TESTS:  Results Reviewed:   Imaging Personally Reviewed:  Electrocardiogram Personally Reviewed:

## 2025-04-19 NOTE — PROGRESS NOTE ADULT - PROBLEM SELECTOR PLAN 1
- ICM, LVEF 25-30% on 3/21 (previously 35-40%). Can repeat TTE once more optimized. Likely in next 48H  - Clinically appears euvolemic on exam however this is somewhat limited by body habitus.   - Serum proBNP >68K  - Hyponatremia likely 2/2 hypervolemia - improving.  - CardioMEMS PAD slowly improving - 27 will recheck Sunday 4/20/25 (goal closer to 14).   - Diuretics: Continue Bumex infusion at 1mg/hr. Goal net negative 2-2.5L/day.   - Please check BMP BID. Maintain K>4, Mg >2.  - GDMT: Increase Entresto to 49-51 mg BID. C/w HDZN 25 mg TID and Toprol-XL 50 mg nightly. Plan to continue to increase ARNi and reduce HDZN if renal fx remains stable. Eventual MRA once renal function stabilizes. No Farxiga 2/2 h/o pyelonephritis.   - Device: s/p Micra PPM with chronic . Consider CRT in future  - Low Na, 1.5L FR diet recommended  - Please document strict I&Os and daily standing weight.

## 2025-04-19 NOTE — PROGRESS NOTE ADULT - PROBLEM SELECTOR PLAN 3
- On 4/10 spiked fever with mild leukocytosis - improved.  - RVP negative. UA with trace leukocytosis. BCx 4/11 NGTD.  - Started empirically on Zosyn. Unclear if diarrhea is related to abx. GI PCR stool sample sent.  - Management per primary team.

## 2025-04-19 NOTE — PROGRESS NOTE ADULT - NS ATTEND AMEND GEN_ALL_CORE FT
73 y/o female with PMH of ICM/HFrEF (LVEF 35-40%), CAD s/p PCI, Mobitz II s/p PPM (MD NereydaT), HTN, HLD, PAD with chronic right LE wound, DM2, and CKD3, who was BIBA from CHF clinic due to hypervolemia, weight gain of ~20 lbs in the past 9 days despite escalation of PO diuretics.      She was recently admitted on 3/19/25 with c/o CP and found to have NSTEMI. She underwent LHC on 3/21 which showed mid-distal LAD severe stenosis, OM1 and OM2 severe stenosis, mid ISR 50%, distal ISR 99%, s/p PCI with 1 ZEINAB to RCA.  On 3/22 she had a fever with leukocytosis and her blood cultures were positive for E. Coli/ESBL. Her CT A/P showed left pyelonephritis. She also had an BRADY for which her Entresto was held. She was discharged home 3/31 and her CardioMEMS was not at goal and had been rising. She was advised to increase Torsemide 40 mg daily to twice daily on 4/4. Despite this, she has continued to gain weight and reports poor UOP response.     Admission labwork: Na 125, K 4.6, BUN 37.7, Cr 1.94, eGFR 27, Mg 2.3, proBNP 60155!  CXR: increased interstitial markings bilaterally, cardiomegaly.   On 4/10 she developed a fever, tmax 100.4F. RVP negative, empirically started on IV Zosyn.  4/13 s/p right knee fluid aspiration (s/p recent trauma/fall).  4/15 initiated on continuous Bumex infusion for inadequate response to intermittent dosing.    Prior Cardiac Testing:  TTE 3/21/25: LVEF 25-30%, grade II DD, normal RV size/function, severe LAE, moderate MR, mild TR, PASP 58 mmHg ( RAP 8 mmHg), no evidence of LV thrombus, multiple segmental abnormalities/regional WMA.    CardioMEMS PAD (goal 14):  4/17: 27 (in chair ~30 degrees).  4/14: 30-33  4/11: 32  4/9: 32  3/27: 26  3/24: 23  3/21: 22        continue with current diuresis, ongoing slow improvement in renal function  lower extremity edema improved, still with dyspnea  bumex 1mg gtt today, fluid restriction, HF education    coverage over weekend for HF team    cardiomems check tomorrow

## 2025-04-19 NOTE — CHART NOTE - NSCHARTNOTEFT_GEN_A_CORE
Called by RN stating that patient was complaining of chest pain. Patient has a history of chest pain that has been managed with tylenol and Maalox. Patient admitted for heart failure with a history of CAD and PPM. Patient seen at bedside, translation services used ( #[id number]). Appears uncomfortable. Complains that the pain is still present after taking the Maalox and tylenol. States the pain started yesterday but was controlled with meds, is 9/10, sternal, and does not radiate. Admits to SOB which is not new. Denies nausea/vomiting, light-headedness, dizziness, weakness, fatigue.      Vital Signs:  T(C): 38.3 (04-19-25 @ 21:33), Max: 38.3 (04-19-25 @ 21:33)  HR: 80 (04-19-25 @ 21:33) (62 - 80)  BP: 161/80 (04-19-25 @ 21:33) (142/70 - 169/78)  RR: 18 (04-19-25 @ 21:33) (17 - 18)  SpO2: 96% (04-19-25 @ 21:33) (96% - 100%)      Physical Exam:  CONSTITUTIONAL: Appears uncomfortable  RESP: No respiratory distress, no use of accessory muscles; CTA b/l, possible wheeze in right lung  CV: RRR, +S1S2, no MRG; +peripheral edema        Radiology:  < from: Xray Chest 1 View- PORTABLE-Urgent (Xray Chest 1 View- PORTABLE-Urgent .) (04.10.25 @ 21:54) >    IMPRESSION:  Limited image.    Low lung volumes.    Right hemidiaphragm is elevated to a greater extent than on the prior   image.    Right mid and bilateral lower lung linear atelectasis.    New patchy peripheral left mid and lower lung opacities that could be due   to atelectasis, asymmetric pulmonary vascular congestion, or developing   infection.    --- End of Report ---    EVELYN OSORIO MD; Attending Radiologist  This document has been electronically signed. Apr 11 2025 11:31AM    < end of copied text >      EKG: V-paced rhythm, unchanged from previous      A/P: 74yoF with a history of coronary artery disease, heart failure, chronic kidney disease, hypertension, diabetes, and anemia who was recently hospitalized for NSTEMI with PCI and bacteremia who presented with dyspnea on exertion and weight gain. Continues on Bumex drip as per cardiology, following strict I/O/ Mems data.  - D/w cardiology, recommends no troponins or further cardiac workup required as patient has hx of chest pain with troponins downtrending  - 1mg morphine for pain/breathing  - Elevate HOB for comfort  - CXR ordered  - Continue to monitor  - RN to re-call PA with any changes in patient's status

## 2025-04-20 LAB
ANION GAP SERPL CALC-SCNC: 17 MMOL/L — SIGNIFICANT CHANGE UP (ref 5–17)
BUN SERPL-MCNC: 28.4 MG/DL — HIGH (ref 8–20)
CALCIUM SERPL-MCNC: 8.1 MG/DL — LOW (ref 8.4–10.5)
CHLORIDE SERPL-SCNC: 98 MMOL/L — SIGNIFICANT CHANGE UP (ref 96–108)
CO2 SERPL-SCNC: 20 MMOL/L — LOW (ref 22–29)
CREAT SERPL-MCNC: 1.79 MG/DL — HIGH (ref 0.5–1.3)
EGFR: 30 ML/MIN/1.73M2 — LOW
EGFR: 30 ML/MIN/1.73M2 — LOW
GLUCOSE BLDC GLUCOMTR-MCNC: 160 MG/DL — HIGH (ref 70–99)
GLUCOSE BLDC GLUCOMTR-MCNC: 179 MG/DL — HIGH (ref 70–99)
GLUCOSE BLDC GLUCOMTR-MCNC: 208 MG/DL — HIGH (ref 70–99)
GLUCOSE BLDC GLUCOMTR-MCNC: 210 MG/DL — HIGH (ref 70–99)
GLUCOSE SERPL-MCNC: 149 MG/DL — HIGH (ref 70–99)
HCT VFR BLD CALC: 27.9 % — LOW (ref 34.5–45)
HGB BLD-MCNC: 9 G/DL — LOW (ref 11.5–15.5)
MCHC RBC-ENTMCNC: 28.6 PG — SIGNIFICANT CHANGE UP (ref 27–34)
MCHC RBC-ENTMCNC: 32.3 G/DL — SIGNIFICANT CHANGE UP (ref 32–36)
MCV RBC AUTO: 88.6 FL — SIGNIFICANT CHANGE UP (ref 80–100)
NRBC # BLD AUTO: 0 K/UL — SIGNIFICANT CHANGE UP (ref 0–0)
NRBC # FLD: 0 K/UL — SIGNIFICANT CHANGE UP (ref 0–0)
NRBC BLD AUTO-RTO: 0 /100 WBCS — SIGNIFICANT CHANGE UP (ref 0–0)
PLATELET # BLD AUTO: 401 K/UL — HIGH (ref 150–400)
PMV BLD: 9.2 FL — SIGNIFICANT CHANGE UP (ref 7–13)
POTASSIUM SERPL-MCNC: 3.8 MMOL/L — SIGNIFICANT CHANGE UP (ref 3.5–5.3)
POTASSIUM SERPL-SCNC: 3.8 MMOL/L — SIGNIFICANT CHANGE UP (ref 3.5–5.3)
RAPID RVP RESULT: SIGNIFICANT CHANGE UP
RBC # BLD: 3.15 M/UL — LOW (ref 3.8–5.2)
RBC # FLD: 18.1 % — HIGH (ref 10.3–14.5)
SARS-COV-2 RNA SPEC QL NAA+PROBE: SIGNIFICANT CHANGE UP
SODIUM SERPL-SCNC: 135 MMOL/L — SIGNIFICANT CHANGE UP (ref 135–145)
WBC # BLD: 10.39 K/UL — SIGNIFICANT CHANGE UP (ref 3.8–10.5)
WBC # FLD AUTO: 10.39 K/UL — SIGNIFICANT CHANGE UP (ref 3.8–10.5)

## 2025-04-20 PROCEDURE — 99233 SBSQ HOSP IP/OBS HIGH 50: CPT

## 2025-04-20 RX ORDER — BUMETANIDE 1 MG/1
2 TABLET ORAL
Qty: 20 | Refills: 0 | Status: DISCONTINUED | OUTPATIENT
Start: 2025-04-20 | End: 2025-04-21

## 2025-04-20 RX ADMIN — Medication 1 MILLIGRAM(S): at 00:09

## 2025-04-20 RX ADMIN — Medication 975 MILLIGRAM(S): at 21:11

## 2025-04-20 RX ADMIN — SACUBITRIL AND VALSARTAN 1 TABLET(S): 6; 6 PELLET ORAL at 05:20

## 2025-04-20 RX ADMIN — METHOCARBAMOL 500 MILLIGRAM(S): 500 TABLET, FILM COATED ORAL at 05:20

## 2025-04-20 RX ADMIN — Medication 975 MILLIGRAM(S): at 06:20

## 2025-04-20 RX ADMIN — INSULIN LISPRO 4: 100 INJECTION, SOLUTION INTRAVENOUS; SUBCUTANEOUS at 12:14

## 2025-04-20 RX ADMIN — INSULIN LISPRO 2: 100 INJECTION, SOLUTION INTRAVENOUS; SUBCUTANEOUS at 08:39

## 2025-04-20 RX ADMIN — Medication 25 MILLIGRAM(S): at 13:06

## 2025-04-20 RX ADMIN — Medication 975 MILLIGRAM(S): at 14:05

## 2025-04-20 RX ADMIN — Medication 40 MILLIGRAM(S): at 05:21

## 2025-04-20 RX ADMIN — Medication 30 MILLILITER(S): at 21:51

## 2025-04-20 RX ADMIN — Medication 975 MILLIGRAM(S): at 21:09

## 2025-04-20 RX ADMIN — Medication 975 MILLIGRAM(S): at 05:20

## 2025-04-20 RX ADMIN — INSULIN GLARGINE-YFGN 5 UNIT(S): 100 INJECTION, SOLUTION SUBCUTANEOUS at 21:09

## 2025-04-20 RX ADMIN — LIDOCAINE HYDROCHLORIDE 1 PATCH: 20 JELLY TOPICAL at 11:14

## 2025-04-20 RX ADMIN — SERTRALINE 25 MILLIGRAM(S): 100 TABLET, FILM COATED ORAL at 11:13

## 2025-04-20 RX ADMIN — METHOCARBAMOL 500 MILLIGRAM(S): 500 TABLET, FILM COATED ORAL at 18:02

## 2025-04-20 RX ADMIN — BUMETANIDE 5 MG/HR: 1 TABLET ORAL at 09:34

## 2025-04-20 RX ADMIN — TICAGRELOR 60 MILLIGRAM(S): 90 TABLET ORAL at 18:01

## 2025-04-20 RX ADMIN — Medication 975 MILLIGRAM(S): at 13:05

## 2025-04-20 RX ADMIN — ATORVASTATIN CALCIUM 80 MILLIGRAM(S): 80 TABLET, FILM COATED ORAL at 21:09

## 2025-04-20 RX ADMIN — Medication 25 MILLIGRAM(S): at 05:21

## 2025-04-20 RX ADMIN — SACUBITRIL AND VALSARTAN 1 TABLET(S): 6; 6 PELLET ORAL at 18:01

## 2025-04-20 RX ADMIN — INSULIN LISPRO 2: 100 INJECTION, SOLUTION INTRAVENOUS; SUBCUTANEOUS at 18:03

## 2025-04-20 RX ADMIN — Medication 1 MILLIGRAM(S): at 22:38

## 2025-04-20 RX ADMIN — Medication 81 MILLIGRAM(S): at 11:13

## 2025-04-20 RX ADMIN — METOPROLOL SUCCINATE 50 MILLIGRAM(S): 50 TABLET, EXTENDED RELEASE ORAL at 05:21

## 2025-04-20 RX ADMIN — GABAPENTIN 400 MILLIGRAM(S): 400 CAPSULE ORAL at 11:12

## 2025-04-20 RX ADMIN — TICAGRELOR 60 MILLIGRAM(S): 90 TABLET ORAL at 05:20

## 2025-04-20 RX ADMIN — Medication 25 MILLIGRAM(S): at 21:09

## 2025-04-20 NOTE — PROGRESS NOTE ADULT - ASSESSMENT
74yoF with a history of coronary artery disease, heart failure, chronic kidney disease, hypertension, diabetes, and anemia who was recently hospitalized for NSTEMI with PCI and bacteremia who presented with dyspnea on exertion and weight gain. Continues on Bumex drip as per cardiology, following strict I/O/ Mems data.     #Acute on chronic systolic heart failure  - Recent echocardiogram noted ejection fraction of 31% with multiple segmental abnormalities  - c/w IV bumex per HF  - Strict in and out, goal net negative 2-2.5 L daily   - Daily weights  - On metoprolol  - Heart Failure on board  - pt with reproducible chest wall pain, started on pain regimen, remains intermittent with improvement with GI cocktail / pain medications, continue to monitor     #Right knee pain  - knee xray showing effusion, possible in setting of fall  - pain regimen adjusted  - CT knee result noted  - ortho consulted, sp aspiration fluid neg for crystals/infections  ROM improving     #Fever  noted febrile episode 4/19 overnight to 100.9, repeat RVP, s/p Abxs with Zosyn, continue to monitor off Abx at this time   - CXR noted with ?Atelectasis vs edema vs infx  - sp Zosyn x 5 days   - swallow eval result noted, diet advanced   - Bcx NGTD    #Diarrhea  - possibly abx related  - GI pcr neg, PRN imodium started    #Coronary artery disease  - Continue on aspirin, ticagrelor, and atorvastatin  - Recent cardiac catheterization with PCI  - initial trop 140 -> 118  - pt with recurrent chest pain, high up in chest, trop downtrended   - c/w maalox, pain improves with GI cocktail      #Chronic kidney disease  - Renal function improved from prior values  - Continue to monitor while on diuresis    #Hyponatremia  improved   - Hypervolemic on presentation  - Bumetanide for diuresis    #Diabetes  - Insulin coverage  - Close monitoring of blood glucose levels  - Gabapentin for neuropathy  - need Glucometer on discharge     DVT ppx: ASA/Brilinta   Dispo: Pending HF optimization and weaning off Bumex drip   PT eval - LALA, but family prefers home

## 2025-04-20 NOTE — PROGRESS NOTE ADULT - NS ATTEND AMEND GEN_ALL_CORE FT
Barb Ko:  73 y/o female with PMH of ICM/HFrEF (LVEF 35-40%), CAD s/p PCI, Mobitz II s/p PPM (MD NereydaT), HTN, HLD, PAD with chronic right LE wound, DM2, and CKD3, who was BIBA from CHF clinic due to hypervolemia, weight gain of ~20 lbs in the past 9 days despite escalation of PO diuretics.     She was recently admitted on 3/19/25 with c/o CP and found to have NSTEMI. She underwent LHC on 3/21 which showed mid-distal LAD severe stenosis, OM1 and OM2 severe stenosis, mid ISR 50%, distal ISR 99%, s/p PCI with 1 ZEINAB to RCA.  On 3/22 she had a fever with leukocytosis and her blood cultures were positive for E. Coli/ESBL. Her CT A/P showed left pyelonephritis. She also had an BRADY for which her Entresto was held. She was discharged home 3/31 and her CardioMEMS was not at goal and had been rising. She was advised to increase Torsemide 40 mg daily to twice daily on 4/4. Despite this, she has continued to gain weight and reports poor UOP response.    Admission labwork: Na 125, K 4.6, BUN 37.7, Cr 1.94, eGFR 27, Mg 2.3, proBNP 96451!  CXR: increased interstitial markings bilaterally, cardiomegaly.  On 4/10 she developed a fever, tmax 100.4F. RVP negative, empirically started on IV Zosyn.  4/13 s/p right knee fluid aspiration (s/p recent trauma/fall).  4/15 initiated on continuous Bumex infusion for inadequate response to intermittent dosing.    Prior Cardiac Testing:  TTE 3/21/25: LVEF 25-30%, grade II DD, normal RV size/function, severe LAE, moderate MR, mild TR, PASP 58 mmHg ( RAP 8 mmHg), no evidence of LV thrombus, multiple segmental abnormalities/regional WMA.    CardioMEMS PAD (goal 14):  4/18 27  4/17: 27 (in chair ~30 degrees).  4/14: 30-33  4/11: 32  4/9: 32  3/27: 26  3/24: 23  3/21: 22 4/20 Cardiomems: 31 !!!! Worsening despite Bumex increase  Added metalozone today    Consulted renal for possible aquapheresis, RRT

## 2025-04-20 NOTE — PROGRESS NOTE ADULT - PROBLEM SELECTOR PLAN 1
- ICM, LVEF 25-30% on 3/21 (previously 35-40%). Can repeat TTE once more optimized. Likely in next 48H  - Clinically appears euvolemic on exam however this is somewhat limited by body habitus.   - Serum proBNP >68K  - Hyponatremia likely 2/2 hypervolemia - improving.  - CardioMEMS PAD slowly improving - 27 will recheck today 4/20/25 (goal closer to 14).   - Diuretics: Continue Bumex infusion at 1mg/hr. Goal net negative 2-2.5L/day.   - Please check BMP BID. Maintain K>4, Mg >2.  - GDMT: Increase Entresto to 49-51 mg BID. C/w HDZN 25 mg TID and Toprol-XL 50 mg nightly. Plan to continue to increase ARNi and reduce HDZN if renal fx remains stable. Eventual MRA once renal function stabilizes. No Farxiga 2/2 h/o pyelonephritis.   - Device: s/p Micra PPM with chronic . Consider CRT in future  - Low Na, 1.5L FR diet recommended  - Please document strict I&Os and daily standing weight. - ICM, LVEF 25-30% on 3/21 (previously 35-40%). Can repeat TTE once more optimized. Likely in next 48H  - Clinically appears euvolemic on exam however this is somewhat limited by body habitus.   - Serum proBNP >68K  - Hyponatremia likely 2/2 hypervolemia - improving.  - CardioMEMS PAD is 31 today, worsening   - we will get nephrology to evaluate for Aquapheresis   - Diuretics: Continue Bumex infusion at 1mg/hr. Goal net negative 2-2.5L/day.   - Please check BMP BID. Maintain K>4, Mg >2.  - GDMT: Increase Entresto to 49-51 mg BID. C/w HDZN 25 mg TID and Toprol-XL 50 mg nightly. Plan to continue to increase ARNi and reduce HDZN if renal fx remains stable. Eventual MRA once renal function stabilizes. No Farxiga 2/2 h/o pyelonephritis.   - Device: s/p Micra PPM with chronic . Consider CRT in future  - Low Na, 1.5L FR diet recommended  - Please document strict I&Os and daily standing weight.

## 2025-04-20 NOTE — PROGRESS NOTE ADULT - SUBJECTIVE AND OBJECTIVE BOX
[facility] Division of Hospital Medicine    Chief Complaint:      SUBJECTIVE / OVERNIGHT EVENTS:    Patient seen and examined at bedside, overnight patient had recurrent chest pain for, unchanged from prior, EKG unchanged, per cardiology no further workup for pain needed, given IV morphine / Maalox with improvement, noted Temperature to 100.9 overnight, RVP ordered, patient denies further symptoms at this time / will continue to monitor off abx given transient episode of low grade fever.     MEDICATIONS  (STANDING):  acetaminophen     Tablet .. 975 milliGRAM(s) Oral every 8 hours  aspirin  chewable 81 milliGRAM(s) Oral daily  atorvastatin 80 milliGRAM(s) Oral at bedtime  buMETAnide Infusion 1 mG/Hr (5 mL/Hr) IV Continuous <Continuous>  dextrose 5%. 1000 milliLiter(s) (50 mL/Hr) IV Continuous <Continuous>  dextrose 5%. 1000 milliLiter(s) (100 mL/Hr) IV Continuous <Continuous>  dextrose 50% Injectable 25 Gram(s) IV Push once  dextrose 50% Injectable 12.5 Gram(s) IV Push once  dextrose 50% Injectable 25 Gram(s) IV Push once  gabapentin 400 milliGRAM(s) Oral daily  glucagon  Injectable 1 milliGRAM(s) IntraMuscular once  hydrALAZINE 25 milliGRAM(s) Oral three times a day  insulin glargine Injectable (LANTUS) 5 Unit(s) SubCutaneous at bedtime  insulin lispro (ADMELOG) corrective regimen sliding scale   SubCutaneous three times a day before meals  lidocaine   4% Patch 1 Patch Transdermal every 24 hours  methocarbamol 500 milliGRAM(s) Oral two times a day  metoprolol succinate ER 50 milliGRAM(s) Oral daily  pantoprazole    Tablet 40 milliGRAM(s) Oral before breakfast  sacubitril 49 mG/valsartan 51 mG 1 Tablet(s) Oral two times a day  sertraline 25 milliGRAM(s) Oral daily  ticagrelor 60 milliGRAM(s) Oral every 12 hours    MEDICATIONS  (PRN):  aluminum hydroxide/magnesium hydroxide/simethicone Suspension 30 milliLiter(s) Oral every 4 hours PRN Dyspepsia  dextrose Oral Gel 15 Gram(s) Oral once PRN Blood Glucose LESS THAN 70 milliGRAM(s)/deciliter  loperamide 2 milliGRAM(s) Oral two times a day PRN Diarrhea  oxyCODONE    IR 5 milliGRAM(s) Oral every 6 hours PRN Severe Pain (7 - 10)        I&O's Summary    19 Apr 2025 07:01  -  20 Apr 2025 07:00  --------------------------------------------------------  IN: 525 mL / OUT: 1150 mL / NET: -625 mL        PHYSICAL EXAM:  Vital Signs Last 24 Hrs  T(C): 36.9 (20 Apr 2025 04:13), Max: 38.3 (19 Apr 2025 21:33)  T(F): 98.5 (20 Apr 2025 04:13), Max: 100.9 (19 Apr 2025 21:33)  HR: 61 (20 Apr 2025 04:13) (60 - 80)  BP: 146/78 (20 Apr 2025 04:13) (146/70 - 169/78)  BP(mean): 112 (19 Apr 2025 20:12) (112 - 112)  RR: 18 (20 Apr 2025 04:13) (18 - 20)  SpO2: 94% (20 Apr 2025 04:13) (93% - 100%)    Parameters below as of 20 Apr 2025 04:13  Patient On (Oxygen Delivery Method): room air      CONSTITUTIONAL: NAD, on NC  RESPIRATORY: Normal respiratory effort; lungs are clear to auscultation bilaterally  CARDIOVASCULAR: Regular rate and rhythm, no edema  ABDOMEN: Nontender to palpation, normoactive bowel sounds  MSK: ROM wnl in extremities x4, R. knee improved     LABS:                        9.0    10.39 )-----------( 401      ( 20 Apr 2025 07:15 )             27.9     04-19    137  |  100  |  29.9[H]  ----------------------------<  147[H]  3.8   |  20.0[L]  |  1.79[H]    Ca    8.0[L]      19 Apr 2025 05:03            Urinalysis Basic - ( 19 Apr 2025 05:03 )    Color: x / Appearance: x / SG: x / pH: x  Gluc: 147 mg/dL / Ketone: x  / Bili: x / Urobili: x   Blood: x / Protein: x / Nitrite: x   Leuk Esterase: x / RBC: x / WBC x   Sq Epi: x / Non Sq Epi: x / Bacteria: x        CAPILLARY BLOOD GLUCOSE      POCT Blood Glucose.: 160 mg/dL (20 Apr 2025 08:29)  POCT Blood Glucose.: 162 mg/dL (19 Apr 2025 21:07)  POCT Blood Glucose.: 214 mg/dL (19 Apr 2025 17:15)  POCT Blood Glucose.: 157 mg/dL (19 Apr 2025 12:11)        RADIOLOGY & ADDITIONAL TESTS:  Results Reviewed:   Imaging Personally Reviewed:  Electrocardiogram Personally Reviewed:

## 2025-04-20 NOTE — PROGRESS NOTE ADULT - SUBJECTIVE AND OBJECTIVE BOX
pt's chart reviewed; full consult to follow    73 yo female (known to our service) + CKD(III) + ICM (EF 25-30%) admitted with decompensated CHF  Despite ongoing diuresis based on CardioMEMS data she has failed to reach her goal  - pt to continue Bumex infusion for now and Entresto as per cardiology  - if diuresis remains suboptimal with current regimen we may need to consider RRT for augmented UF

## 2025-04-20 NOTE — PROGRESS NOTE ADULT - SUBJECTIVE AND OBJECTIVE BOX
[facility]                                                         CARDIOLOGY AT [address], [place] [address]                                                         Telephone: [phone]. Fax:[phone]                                                                             PROGRESS NOTE  Reason for follow up: heart failure     Review of symptoms:   Cardiac:  No chest pain. No dyspnea. No palpitations.  Respiratory: no cough. No dyspnea  Gastrointestinal: No diarrhea. No abdominal pain. No bleeding.   Neuro: No focal neuro complaints.  All other ROS negative unless otherwise listed above    PHYSICAL EXAM:  Appearance: Comfortable. No acute distress  HEENT:  Atraumatic. Normocephalic.  Normal oral mucosa  Neurologic: A & O x 3, no gross focal deficits.  Cardiovascular: RRR S1 S2, No murmur, no rubs/gallops. No JVD  Respiratory: Lungs clear to auscultation, unlabored   Gastrointestinal:  Soft, Non-tender, + BS  Lower Extremities: 2+ Peripheral Pulses, No clubbing, cyanosis, or edema  Psychiatry: Patient is calm. No agitation.   Skin: warm and dry.    Vitals:  T(C): 36.9 (04-20-25 @ 08:02), Max: 38.3 (04-19-25 @ 21:33)  HR: 70 (04-20-25 @ 08:02) (60 - 80)  BP: 151/80 (04-20-25 @ 08:02) (146/70 - 169/78)  RR: 17 (04-20-25 @ 08:02) (17 - 20)  SpO2: 97% (04-20-25 @ 08:02) (93% - 100%)  Wt(kg): --  I&O's Summary    19 Apr 2025 07:01  -  20 Apr 2025 07:00  --------------------------------------------------------  IN: 525 mL / OUT: 1150 mL / NET: -625 mL    CURRENT CARDIAC MEDICATIONS:  buMETAnide Infusion 1 mG/Hr IV Continuous <Continuous>  hydrALAZINE 25 milliGRAM(s) Oral three times a day  metoprolol succinate ER 50 milliGRAM(s) Oral daily  sacubitril 49 mG/valsartan 51 mG 1 Tablet(s) Oral two times a day    CURRENT OTHER MEDICATIONS:  acetaminophen     Tablet .. 975 milliGRAM(s) Oral every 8 hours  gabapentin 400 milliGRAM(s) Oral daily  methocarbamol 500 milliGRAM(s) Oral two times a day  oxyCODONE    IR 5 milliGRAM(s) Oral every 6 hours PRN Severe Pain (7 - 10)  sertraline 25 milliGRAM(s) Oral daily  aluminum hydroxide/magnesium hydroxide/simethicone Suspension 30 milliLiter(s) Oral every 4 hours PRN Dyspepsia  loperamide 2 milliGRAM(s) Oral two times a day PRN Diarrhea  pantoprazole    Tablet 40 milliGRAM(s) Oral before breakfast  atorvastatin 80 milliGRAM(s) Oral at bedtime  dextrose 50% Injectable 25 Gram(s) IV Push once, Stop order after: 1 Doses  dextrose 50% Injectable 12.5 Gram(s) IV Push once, Stop order after: 1 Doses  dextrose 50% Injectable 25 Gram(s) IV Push once, Stop order after: 1 Doses  dextrose Oral Gel 15 Gram(s) Oral once, Stop order after: 1 Doses PRN Blood Glucose LESS THAN 70 milliGRAM(s)/deciliter  glucagon  Injectable 1 milliGRAM(s) IntraMuscular once, Stop order after: 1 Doses  insulin glargine Injectable (LANTUS) 5 Unit(s) SubCutaneous at bedtime  insulin lispro (ADMELOG) corrective regimen sliding scale   SubCutaneous three times a day before meals  aspirin  chewable 81 milliGRAM(s) Oral daily  dextrose 5%. 1000 milliLiter(s) (50 mL/Hr) IV Continuous <Continuous>  dextrose 5%. 1000 milliLiter(s) (100 mL/Hr) IV Continuous <Continuous>  lidocaine   4% Patch 1 Patch Transdermal every 24 hours  ticagrelor 60 milliGRAM(s) Oral every 12 hours      LABS:	 	                        9.0    10.39 )-----------( 401      ( 20 Apr 2025 07:15 )             27.9     04-20    135  |  98  |  28.4[H]  ----------------------------<  149[H]  3.8   |  20.0[L]  |  1.79[H]    Ca    8.1[L]      20 Apr 2025 07:15    Lipid Profile:   HgA1c:   TSH:

## 2025-04-20 NOTE — PROGRESS NOTE ADULT - ASSESSMENT
Gen Cards: Dr. Allen  HF: Dr. Polanco    75 y/o female with PMH of ICM/HFrEF (LVEF 35-40%), CAD s/p PCI, Mobitz II s/p PPM (MIRNA Dawn), HTN, HLD, PAD with chronic right LE wound, DM2, and CKD3, who was BIBA from CHF clinic due to hypervolemia, weight gain of ~20 lbs in the past 9 days despite escalation of PO diuretics.      She was recently admitted on 3/19/25 with c/o CP and found to have NSTEMI. She underwent LHC on 3/21 which showed mid-distal LAD severe stenosis, OM1 and OM2 severe stenosis, mid ISR 50%, distal ISR 99%, s/p PCI with 1 ZEINAB to RCA.  On 3/22 she had a fever with leukocytosis and her blood cultures were positive for E. Coli/ESBL. Her CT A/P showed left pyelonephritis. She also had an BRADY for which her Entresto was held. She was discharged home 3/31 and her CardioMEMS was not at goal and had been rising. She was advised to increase Torsemide 40 mg daily to twice daily on 4/4. Despite this, she has continued to gain weight and reports poor UOP response.     Admission labwork: Na 125, K 4.6, BUN 37.7, Cr 1.94, eGFR 27, Mg 2.3, proBNP 05713!  CXR: increased interstitial markings bilaterally, cardiomegaly.   On 4/10 she developed a fever, tmax 100.4F. RVP negative, empirically started on IV Zosyn.  4/13 s/p right knee fluid aspiration (s/p recent trauma/fall).  4/15 initiated on continuous Bumex infusion for inadequate response to intermittent dosing.    Prior Cardiac Testing:  TTE 3/21/25: LVEF 25-30%, grade II DD, normal RV size/function, severe LAE, moderate MR, mild TR, PASP 58 mmHg ( RAP 8 mmHg), no evidence of LV thrombus, multiple segmental abnormalities/regional WMA.    CardioMEMS PAD (goal 14):  4/17: 27 (in chair ~30 degrees).  4/14: 30-33  4/11: 32  4/9: 32  3/27: 26  3/24: 23  3/21: 22

## 2025-04-21 ENCOUNTER — RESULT REVIEW (OUTPATIENT)
Age: 75
End: 2025-04-21

## 2025-04-21 ENCOUNTER — APPOINTMENT (OUTPATIENT)
Dept: CARDIOLOGY | Facility: CLINIC | Age: 75
End: 2025-04-21

## 2025-04-21 LAB
ALBUMIN SERPL ELPH-MCNC: 3.5 G/DL — SIGNIFICANT CHANGE UP (ref 3.3–5.2)
ALP SERPL-CCNC: 139 U/L — HIGH (ref 40–120)
ALT FLD-CCNC: 10 U/L — SIGNIFICANT CHANGE UP
ANION GAP SERPL CALC-SCNC: 19 MMOL/L — HIGH (ref 5–17)
AST SERPL-CCNC: 16 U/L — SIGNIFICANT CHANGE UP
BASOPHILS # BLD AUTO: 0.11 K/UL — SIGNIFICANT CHANGE UP (ref 0–0.2)
BASOPHILS NFR BLD AUTO: 0.8 % — SIGNIFICANT CHANGE UP (ref 0–2)
BILIRUB SERPL-MCNC: 0.6 MG/DL — SIGNIFICANT CHANGE UP (ref 0.4–2)
BUN SERPL-MCNC: 28.6 MG/DL — HIGH (ref 8–20)
CALCIUM SERPL-MCNC: 8.7 MG/DL — SIGNIFICANT CHANGE UP (ref 8.4–10.5)
CHLORIDE SERPL-SCNC: 97 MMOL/L — SIGNIFICANT CHANGE UP (ref 96–108)
CO2 SERPL-SCNC: 21 MMOL/L — LOW (ref 22–29)
CREAT SERPL-MCNC: 1.84 MG/DL — HIGH (ref 0.5–1.3)
EGFR: 29 ML/MIN/1.73M2 — LOW
EGFR: 29 ML/MIN/1.73M2 — LOW
EOSINOPHIL # BLD AUTO: 0.12 K/UL — SIGNIFICANT CHANGE UP (ref 0–0.5)
EOSINOPHIL NFR BLD AUTO: 0.9 % — SIGNIFICANT CHANGE UP (ref 0–6)
GLUCOSE BLDC GLUCOMTR-MCNC: 146 MG/DL — HIGH (ref 70–99)
GLUCOSE BLDC GLUCOMTR-MCNC: 190 MG/DL — HIGH (ref 70–99)
GLUCOSE BLDC GLUCOMTR-MCNC: 217 MG/DL — HIGH (ref 70–99)
GLUCOSE BLDC GLUCOMTR-MCNC: 220 MG/DL — HIGH (ref 70–99)
GLUCOSE SERPL-MCNC: 226 MG/DL — HIGH (ref 70–99)
HCT VFR BLD CALC: 31.6 % — LOW (ref 34.5–45)
HGB BLD-MCNC: 10.3 G/DL — LOW (ref 11.5–15.5)
IMM GRANULOCYTES # BLD AUTO: 0.15 K/UL — HIGH (ref 0–0.07)
IMM GRANULOCYTES NFR BLD AUTO: 1.1 % — HIGH (ref 0–0.9)
LYMPHOCYTES # BLD AUTO: 1.34 K/UL — SIGNIFICANT CHANGE UP (ref 1–3.3)
LYMPHOCYTES NFR BLD AUTO: 10.2 % — LOW (ref 13–44)
MAGNESIUM SERPL-MCNC: 2.3 MG/DL — SIGNIFICANT CHANGE UP (ref 1.6–2.6)
MCHC RBC-ENTMCNC: 28.7 PG — SIGNIFICANT CHANGE UP (ref 27–34)
MCHC RBC-ENTMCNC: 32.6 G/DL — SIGNIFICANT CHANGE UP (ref 32–36)
MCV RBC AUTO: 88 FL — SIGNIFICANT CHANGE UP (ref 80–100)
MONOCYTES # BLD AUTO: 0.87 K/UL — SIGNIFICANT CHANGE UP (ref 0–0.9)
MONOCYTES NFR BLD AUTO: 6.6 % — SIGNIFICANT CHANGE UP (ref 2–14)
NEUTROPHILS # BLD AUTO: 10.58 K/UL — HIGH (ref 1.8–7.4)
NEUTROPHILS NFR BLD AUTO: 80.4 % — HIGH (ref 43–77)
NRBC # BLD AUTO: 0 K/UL — SIGNIFICANT CHANGE UP (ref 0–0)
NRBC # FLD: 0 K/UL — SIGNIFICANT CHANGE UP (ref 0–0)
NRBC BLD AUTO-RTO: 0 /100 WBCS — SIGNIFICANT CHANGE UP (ref 0–0)
NT-PROBNP SERPL-SCNC: HIGH PG/ML (ref 0–300)
PLATELET # BLD AUTO: 471 K/UL — HIGH (ref 150–400)
PMV BLD: 9.2 FL — SIGNIFICANT CHANGE UP (ref 7–13)
POTASSIUM SERPL-MCNC: 4.6 MMOL/L — SIGNIFICANT CHANGE UP (ref 3.5–5.3)
POTASSIUM SERPL-SCNC: 4.6 MMOL/L — SIGNIFICANT CHANGE UP (ref 3.5–5.3)
PROT SERPL-MCNC: 7.6 G/DL — SIGNIFICANT CHANGE UP (ref 6.6–8.7)
RBC # BLD: 3.59 M/UL — LOW (ref 3.8–5.2)
RBC # FLD: 18.2 % — HIGH (ref 10.3–14.5)
SODIUM SERPL-SCNC: 136 MMOL/L — SIGNIFICANT CHANGE UP (ref 135–145)
WBC # BLD: 13.17 K/UL — HIGH (ref 3.8–10.5)
WBC # FLD AUTO: 13.17 K/UL — HIGH (ref 3.8–10.5)

## 2025-04-21 PROCEDURE — 99233 SBSQ HOSP IP/OBS HIGH 50: CPT

## 2025-04-21 PROCEDURE — 71045 X-RAY EXAM CHEST 1 VIEW: CPT | Mod: 26

## 2025-04-21 PROCEDURE — 93306 TTE W/DOPPLER COMPLETE: CPT | Mod: 26

## 2025-04-21 RX ORDER — ISOSORBIDE MONONITRATE 60 MG/1
30 TABLET, EXTENDED RELEASE ORAL DAILY
Refills: 0 | Status: DISCONTINUED | OUTPATIENT
Start: 2025-04-21 | End: 2025-04-23

## 2025-04-21 RX ORDER — TICAGRELOR 90 MG/1
90 TABLET ORAL EVERY 12 HOURS
Refills: 0 | Status: DISCONTINUED | OUTPATIENT
Start: 2025-04-21 | End: 2025-05-09

## 2025-04-21 RX ORDER — BUMETANIDE 1 MG/1
4 TABLET ORAL ONCE
Refills: 0 | Status: COMPLETED | OUTPATIENT
Start: 2025-04-21 | End: 2025-04-21

## 2025-04-21 RX ORDER — BUMETANIDE 1 MG/1
4 TABLET ORAL EVERY 12 HOURS
Refills: 0 | Status: DISCONTINUED | OUTPATIENT
Start: 2025-04-21 | End: 2025-04-23

## 2025-04-21 RX ORDER — SPIRONOLACTONE 25 MG
25 TABLET ORAL DAILY
Refills: 0 | Status: DISCONTINUED | OUTPATIENT
Start: 2025-04-21 | End: 2025-04-27

## 2025-04-21 RX ADMIN — Medication 25 MILLIGRAM(S): at 15:22

## 2025-04-21 RX ADMIN — METHOCARBAMOL 500 MILLIGRAM(S): 500 TABLET, FILM COATED ORAL at 05:22

## 2025-04-21 RX ADMIN — Medication 975 MILLIGRAM(S): at 05:22

## 2025-04-21 RX ADMIN — ISOSORBIDE MONONITRATE 30 MILLIGRAM(S): 60 TABLET, EXTENDED RELEASE ORAL at 12:09

## 2025-04-21 RX ADMIN — INSULIN LISPRO 2: 100 INJECTION, SOLUTION INTRAVENOUS; SUBCUTANEOUS at 08:31

## 2025-04-21 RX ADMIN — Medication 975 MILLIGRAM(S): at 21:42

## 2025-04-21 RX ADMIN — Medication 25 MILLIGRAM(S): at 21:43

## 2025-04-21 RX ADMIN — Medication 25 MILLIGRAM(S): at 05:22

## 2025-04-21 RX ADMIN — SACUBITRIL AND VALSARTAN 1 TABLET(S): 6; 6 PELLET ORAL at 17:11

## 2025-04-21 RX ADMIN — METOPROLOL SUCCINATE 50 MILLIGRAM(S): 50 TABLET, EXTENDED RELEASE ORAL at 05:22

## 2025-04-21 RX ADMIN — LIDOCAINE HYDROCHLORIDE 1 PATCH: 20 JELLY TOPICAL at 12:11

## 2025-04-21 RX ADMIN — SERTRALINE 25 MILLIGRAM(S): 100 TABLET, FILM COATED ORAL at 12:08

## 2025-04-21 RX ADMIN — TICAGRELOR 60 MILLIGRAM(S): 90 TABLET ORAL at 05:22

## 2025-04-21 RX ADMIN — METHOCARBAMOL 500 MILLIGRAM(S): 500 TABLET, FILM COATED ORAL at 17:14

## 2025-04-21 RX ADMIN — SACUBITRIL AND VALSARTAN 1 TABLET(S): 6; 6 PELLET ORAL at 05:22

## 2025-04-21 RX ADMIN — Medication 81 MILLIGRAM(S): at 12:08

## 2025-04-21 RX ADMIN — TICAGRELOR 90 MILLIGRAM(S): 90 TABLET ORAL at 17:13

## 2025-04-21 RX ADMIN — ATORVASTATIN CALCIUM 80 MILLIGRAM(S): 80 TABLET, FILM COATED ORAL at 21:43

## 2025-04-21 RX ADMIN — BUMETANIDE 132 MILLIGRAM(S): 1 TABLET ORAL at 15:57

## 2025-04-21 RX ADMIN — BUMETANIDE 10 MG/HR: 1 TABLET ORAL at 08:18

## 2025-04-21 RX ADMIN — LIDOCAINE HYDROCHLORIDE 1 PATCH: 20 JELLY TOPICAL at 19:00

## 2025-04-21 RX ADMIN — INSULIN LISPRO 4: 100 INJECTION, SOLUTION INTRAVENOUS; SUBCUTANEOUS at 13:13

## 2025-04-21 RX ADMIN — BUMETANIDE 10 MG/HR: 1 TABLET ORAL at 03:59

## 2025-04-21 RX ADMIN — BUMETANIDE 132 MILLIGRAM(S): 1 TABLET ORAL at 23:23

## 2025-04-21 RX ADMIN — INSULIN GLARGINE-YFGN 5 UNIT(S): 100 INJECTION, SOLUTION SUBCUTANEOUS at 21:43

## 2025-04-21 RX ADMIN — Medication 975 MILLIGRAM(S): at 05:31

## 2025-04-21 RX ADMIN — Medication 40 MILLIGRAM(S): at 05:22

## 2025-04-21 RX ADMIN — Medication 975 MILLIGRAM(S): at 22:42

## 2025-04-21 RX ADMIN — Medication 975 MILLIGRAM(S): at 15:22

## 2025-04-21 RX ADMIN — GABAPENTIN 400 MILLIGRAM(S): 400 CAPSULE ORAL at 13:22

## 2025-04-21 NOTE — PROGRESS NOTE ADULT - SUBJECTIVE AND OBJECTIVE BOX
[facility] Division of Hospital Medicine    Chief Complaint:      SUBJECTIVE / OVERNIGHT EVENTS:    Patient seen and examined at bedside, no acute events overnight, patient continues to endorse intermittent chest pain, per HF potential consideration for repeat Cath with interventional cardiology. Monitoring mems data, still elevated 4/20 at 31, discussed with HF, increasing diuresis with Bumex drip, add metolazone if labs support, nephrology consulted as per cardiology for consideration of RRT for UF if needed.     MEDICATIONS  (STANDING):  acetaminophen     Tablet .. 975 milliGRAM(s) Oral every 8 hours  aspirin  chewable 81 milliGRAM(s) Oral daily  atorvastatin 80 milliGRAM(s) Oral at bedtime  buMETAnide Infusion 2 mG/Hr (10 mL/Hr) IV Continuous <Continuous>  dextrose 5%. 1000 milliLiter(s) (50 mL/Hr) IV Continuous <Continuous>  dextrose 5%. 1000 milliLiter(s) (100 mL/Hr) IV Continuous <Continuous>  dextrose 50% Injectable 25 Gram(s) IV Push once  dextrose 50% Injectable 12.5 Gram(s) IV Push once  dextrose 50% Injectable 25 Gram(s) IV Push once  gabapentin 400 milliGRAM(s) Oral daily  glucagon  Injectable 1 milliGRAM(s) IntraMuscular once  hydrALAZINE 25 milliGRAM(s) Oral three times a day  insulin glargine Injectable (LANTUS) 5 Unit(s) SubCutaneous at bedtime  insulin lispro (ADMELOG) corrective regimen sliding scale   SubCutaneous three times a day before meals  isosorbide   mononitrate ER Tablet (IMDUR) 30 milliGRAM(s) Oral daily  lidocaine   4% Patch 1 Patch Transdermal every 24 hours  methocarbamol 500 milliGRAM(s) Oral two times a day  metoprolol succinate ER 50 milliGRAM(s) Oral daily  pantoprazole    Tablet 40 milliGRAM(s) Oral before breakfast  sacubitril 49 mG/valsartan 51 mG 1 Tablet(s) Oral two times a day  sertraline 25 milliGRAM(s) Oral daily  ticagrelor 90 milliGRAM(s) Oral every 12 hours    MEDICATIONS  (PRN):  aluminum hydroxide/magnesium hydroxide/simethicone Suspension 30 milliLiter(s) Oral every 4 hours PRN Dyspepsia  dextrose Oral Gel 15 Gram(s) Oral once PRN Blood Glucose LESS THAN 70 milliGRAM(s)/deciliter  loperamide 2 milliGRAM(s) Oral two times a day PRN Diarrhea        I&O's Summary    20 Apr 2025 07:01  -  21 Apr 2025 07:00  --------------------------------------------------------  IN: 890 mL / OUT: 1000 mL / NET: -110 mL        PHYSICAL EXAM:  Vital Signs Last 24 Hrs  T(C): 36.7 (21 Apr 2025 11:31), Max: 36.8 (20 Apr 2025 23:23)  T(F): 98 (21 Apr 2025 11:31), Max: 98.3 (21 Apr 2025 05:00)  HR: 71 (21 Apr 2025 11:31) (61 - 82)  BP: 168/83 (21 Apr 2025 11:31) (147/80 - 168/83)  BP(mean): --  RR: 18 (21 Apr 2025 11:31) (16 - 18)  SpO2: 98% (21 Apr 2025 11:31) (97% - 99%)    Parameters below as of 21 Apr 2025 11:31  Patient On (Oxygen Delivery Method): room air      CONSTITUTIONAL: NAD, on NC  RESPIRATORY: Normal respiratory effort; lungs are clear to auscultation bilaterally  CARDIOVASCULAR: Regular rate and rhythm   ABDOMEN: Nontender to palpation, normoactive bowel sounds  MSK: ROM wnl in extremities x4, R. knee improved     LABS:                        9.0    10.39 )-----------( 401      ( 20 Apr 2025 07:15 )             27.9     04-20    135  |  98  |  28.4[H]  ----------------------------<  149[H]  3.8   |  20.0[L]  |  1.79[H]    Ca    8.1[L]      20 Apr 2025 07:15            Urinalysis Basic - ( 20 Apr 2025 07:15 )    Color: x / Appearance: x / SG: x / pH: x  Gluc: 149 mg/dL / Ketone: x  / Bili: x / Urobili: x   Blood: x / Protein: x / Nitrite: x   Leuk Esterase: x / RBC: x / WBC x   Sq Epi: x / Non Sq Epi: x / Bacteria: x        CAPILLARY BLOOD GLUCOSE      POCT Blood Glucose.: 190 mg/dL (21 Apr 2025 07:53)  POCT Blood Glucose.: 210 mg/dL (20 Apr 2025 21:01)  POCT Blood Glucose.: 179 mg/dL (20 Apr 2025 17:18)        RADIOLOGY & ADDITIONAL TESTS:  Results Reviewed:   Imaging Personally Reviewed:  Electrocardiogram Personally Reviewed:

## 2025-04-21 NOTE — PROGRESS NOTE ADULT - NS ATTEND AMEND GEN_ALL_CORE FT
Ms. Ko is a 73 y/o Czech speaking female with PMH of ICM/HFrEF (LVEF 35-40%), CAD s/p PCI, Mobitz II s/p PPM (MIRNA Dawn), HTN, HLD, PAD with chronic right LE wound, DM2, and CKD3, who presented to the ED on 3/19 with c/o CP, found to have NSTEMI. She underwent PCI to her RCA for ISR on 3/21/25. Over the next couple of days she developed fever, leukocytosis and worsening renal function. Her blood cultures are positive for ESBL E.coli and CT abdomen is concerning for left pyelonephritis. She was treated with antibiotics and was discharged on oral torsemide and she was instructed to check her cardiomems reading for further diuretic titration. She presented to the HF clinic on April 9th and was found to be very volume overloaded on exam with pro BNP > 27438, weight gain of 20 pounds and cardiomems PAD 32-33. She was admitted at [facility] for further management.     Assessment:  Acute on chronic systolic heart failure  Severe LV systolic function        Continue bumex 2 mg IV TID. If she is not responding to intermittent bumex, low threshold to start bumex drip.   Continue Toprol 50 mg daily.   Continue Imdur 30 mg daily and hydralazine 50 mg TID.   As renal function has remained stable, we will start Entresto 24-26 mg BID.   We will uptitrate GDMT based on her renal function.   She was not discharged on ARNI, MRA during her last admission because of acute on chronic kidney disease.  Not on SGLT2i given recent pyelonephritis.  Continue DAPT and statin for recent PCI.   We will repeat an echocardiogram when she is more euvolemic.     Further management of other co-morbidities per primary team. Ms. Ko is a 73 y/o Divehi speaking female with PMH of ICM/HFrEF (LVEF 35-40%), CAD s/p PCI, Mobitz II s/p PPM (MIRNA Dawn), HTN, HLD, PAD with chronic right LE wound, DM2, and CKD3, who presented to the ED on 3/19 with c/o CP, found to have NSTEMI. She underwent PCI to her RCA for ISR on 3/21/25. Over the next couple of days she developed fever, leukocytosis and worsening renal function. Her blood cultures are positive for ESBL E.coli and CT abdomen is concerning for left pyelonephritis. She was treated with antibiotics and was discharged on oral torsemide and she was instructed to check her cardiomems reading for further diuretic titration. She presented to the HF clinic on April 9th and was found to be very volume overloaded on exam with pro BNP > 80518, weight gain of 20 pounds and cardiomems PAD 32-33. She was admitted at [facility] for further management.     TTE: 3/21/25  LVEF 25-30%, LVEDD 4.6 cm, normal RV size and function, moderate MR, mild TR, PASP 58    Assessment:  Acute on chronic systolic heart failure  Severe LV systolic function  Ischemic cardiomyopathy  CAD s/p recent PCI to RCA ISR in 3/25 with LAD and LCx disease  Chronic kidney disease  Mobitz type II s/p MICRA  Hypertension  Hyperlipidemia  PAD  Diabetes mellitus type 2      Plan:   Stop bumex gtt. She is not responding to bumex.   Cardiomems 31 from 4/20/25.   One dose metolazone 5 mg with IV bumex 4 mg BID.   Continue Toprol 50 mg daily, entresto 49-51 mg BID.   Start aldactone 25 mg daily.  Start imdur 30 mg daily for anginal pain. We will consider repeat LHC if symptoms are worse. EKG is V-paced.  Continue hydralazine 50 mg TID.   Check repeat TTE today to assess LVEF.   Not on SGLT2i given recent pyelonephritis.  Continue DAPT and statin for recent PCI.   Low threshold for repeat RHC +/- cardiomems recalibration if she doesn't respond to metolazone with bumex.     Further management of other co-morbidities per primary team.

## 2025-04-21 NOTE — CONSULT NOTE ADULT - SUBJECTIVE AND OBJECTIVE BOX
HPI: The pt is a 75 yo female (known to our service from previous admissions) PMH + DM + HTN + CKD(III) who presented to hospital several days ago with progressive dyspnea accompanied by increased edema and weight gain.  Pt subsequently diagnosed with decompensated CHF and has been receiving diuretic dosing based on CardioMEMS data.  We are now asked to evaluate for possible RRT as patient continues to have signs of significant fluid overload despite aggressive diuresis.        PAST MEDICAL & SURGICAL HISTORY:  DM (diabetes mellitus)      HTN (hypertension)      H/O gastroesophageal reflux (GERD)      Cardiac pacemaker  MICRA for mobitz type 11      CVA (cerebrovascular accident)  resdiual right sided weakness      CVA (cerebrovascular accident)      PAD (peripheral artery disease)      Wound of right leg      History of benign brain tumor      Cataract      History of biopsy      Cardiac pacemaker      S/P angiogram of extremity        FAMILY HISTORY:  No CKD noted      Social History:  No current tobacco; no etoh  nor drug abuse      MEDICATIONS  (STANDING):  acetaminophen     Tablet .. 975 milliGRAM(s) Oral every 8 hours  aspirin  chewable 81 milliGRAM(s) Oral daily  atorvastatin 80 milliGRAM(s) Oral at bedtime  buMETAnide Infusion 2 mG/Hr (10 mL/Hr) IV Continuous <Continuous>  dextrose 5%. 1000 milliLiter(s) (50 mL/Hr) IV Continuous <Continuous>  dextrose 5%. 1000 milliLiter(s) (100 mL/Hr) IV Continuous <Continuous>  dextrose 50% Injectable 25 Gram(s) IV Push once  dextrose 50% Injectable 12.5 Gram(s) IV Push once  dextrose 50% Injectable 25 Gram(s) IV Push once  gabapentin 400 milliGRAM(s) Oral daily  glucagon  Injectable 1 milliGRAM(s) IntraMuscular once  hydrALAZINE 25 milliGRAM(s) Oral three times a day  insulin glargine Injectable (LANTUS) 5 Unit(s) SubCutaneous at bedtime  insulin lispro (ADMELOG) corrective regimen sliding scale   SubCutaneous three times a day before meals  lidocaine   4% Patch 1 Patch Transdermal every 24 hours  methocarbamol 500 milliGRAM(s) Oral two times a day  metoprolol succinate ER 50 milliGRAM(s) Oral daily  pantoprazole    Tablet 40 milliGRAM(s) Oral before breakfast  sacubitril 49 mG/valsartan 51 mG 1 Tablet(s) Oral two times a day  sertraline 25 milliGRAM(s) Oral daily  ticagrelor 60 milliGRAM(s) Oral every 12 hours    MEDICATIONS  (PRN):  aluminum hydroxide/magnesium hydroxide/simethicone Suspension 30 milliLiter(s) Oral every 4 hours PRN Dyspepsia  dextrose Oral Gel 15 Gram(s) Oral once PRN Blood Glucose LESS THAN 70 milliGRAM(s)/deciliter  loperamide 2 milliGRAM(s) Oral two times a day PRN Diarrhea      Allergies    No Known Allergies    Intolerances        REVIEW OF SYSTEMS:  CONSTITUTIONAL: + fatigue  EYES: No eye pain, visual disturbances, or discharge  ENMT:  No difficulty hearing, tinnitus, vertigo; No sinus or throat pain  NECK: No pain or stiffness  BREASTS: No pain, masses, or nipple discharge  RESPIRATORY: No cough, wheezing, chills or hemoptysis; No shortness of breath  CARDIOVASCULAR: No chest pain, palpitations, dizziness, or leg swelling  GASTROINTESTINAL: No abdominal or epigastric pain. No nausea, vomiting, or hematemesis; No diarrhea or constipation. No melena or hematochezia.  GENITOURINARY: No dysuria, frequency, hematuria, or incontinence  NEUROLOGICAL: No headaches, memory loss, loss of strength, numbness, or tremors  SKIN: No itching, burning, rashes, or lesions   MUSCULOSKELETAL: No joint pain or swelling; No muscle, back, or extremity pain  PSYCHIATRIC: No depression, anxiety, mood swings, or difficulty sleeping        Vital Signs Last 24 Hrs  T(C): 36.8 (21 Apr 2025 05:00), Max: 36.9 (20 Apr 2025 08:02)  T(F): 98.3 (21 Apr 2025 05:00), Max: 98.4 (20 Apr 2025 08:02)  HR: 82 (21 Apr 2025 05:00) (62 - 82)  BP: 160/78 (21 Apr 2025 05:00) (147/80 - 161/84)  BP(mean): --  RR: 18 (21 Apr 2025 05:00) (16 - 18)  SpO2: 98% (21 Apr 2025 05:00) (97% - 99%)    Parameters below as of 21 Apr 2025 05:00  Patient On (Oxygen Delivery Method): room air        PHYSICAL EXAM:  GENERAL: Fatigued  HEAD: Atraumatic, Normocephalic  EYES: Conjunctiva and sclera clear  ENMT:  Moist mucous membranes  NECK: Supple, No JVD  NERVOUS SYSTEM:  Alert & Oriented X3, intact and symmetric  CHEST/LUNG:   HEART: Regular rate and rhythm; No murmurs, rubs, or gallops  ABDOMEN: Soft, Nontender, +BS  EXTREMITIES: + dependent edema  SKIN: No rashes or lesions      LABS:                        9.0    10.39 )-----------( 401      ( 20 Apr 2025 07:15 )             27.9     04-20    135  |  98  |  28.4[H]  ----------------------------<  149[H]  3.8   |  20.0[L]  |  1.79[H]    Ca    8.1[L]      20 Apr 2025 07:15        Urinalysis Basic - ( 20 Apr 2025 07:15 )    Color: x / Appearance: x / SG: x / pH: x  Gluc: 149 mg/dL / Ketone: x  / Bili: x / Urobili: x   Blood: x / Protein: x / Nitrite: x   Leuk Esterase: x / RBC: x / WBC x   Sq Epi: x / Non Sq Epi: x / Bacteria: x    Protein/Creatinine Ratio Calculation: 0.6 Ratio (04.11.25      RADIOLOGY & ADDITIONAL TESTS:  < from: Xray Chest 1 View- PORTABLE-Urgent (Xray Chest 1 View- PORTABLE-Urgent .) (04.19.25 @ 23:36) >  ACC: [id number] EXAM:  XR CHEST PORTABLE URGENT 1V   ORDERED BY: CHERYL ESPINAL     PROCEDURE DATE:  04/19/2025          INTERPRETATION:  Portable AP chest radiograph    COMPARISON: 4/10/2025 chest X or chest x-ray.    CLINICAL INFORMATION: 4/10/2025 chest x-ray.    FINDINGS:  CATHETERS AND TUBES: None    PULMONARY:  Mild bilateral interstitial lung disease without large airspace   consolidation or pleural effusion.  No pneumothorax.      HEART/VASCULAR: The heart size and mediastinum configuration are within   the limits of normal. Micra Transcatheter Pacing System cardiac  device   within RIGHT ventricle.    BONES: The visualized osseous thorax is intact.    IMPRESSION:  No large airspace consolidation or pleural effusion.    < end of copied text >      < from: TTE W or WO Ultrasound Enhancing Agent (03.21.25 @ 17:56) >    TRANSTHORACIC ECHOCARDIOGRAM REPORT  ________________________________________________________________________________                                      _______       Pt. Name:       SHIREEN CASH  Study Date:    3/21/2025  MRN:            [id number]  YOB: 1950  Accession #:    [id number]   Age:           74 years  Account#:       [id number]  Gender:        F  Heart Rate:     63 bpm      Height:        51.00 in (129.54 cm)  Rhythm:                     Weight:        156.20 lb (70.85 kg)  Blood Pressure: 149/74 mmHg BSA/BMI:       1.49 m² / 42.22 kg/m²  ________________________________________________________________________________________  Referring Physician:    [id number] Liam Raymond  Interpreting Physician: Jim Wilkinson DO  Primary Sonographer:    Nichol Aguiar    CPT:                ECHO TTE WITH CON COMP W DOPP - .m;DEFINITY ECHO                      CONTRAST PER ML - .m  Indication(s):      Chest pain, unspecified - R07.9  Procedure:          Transthoracicechocardiogram with 2-D, M-mode and complete                      spectral and color flow Doppler.  Ordering Location:  [facility]  Admission Status:   Inpatient  Contrast Injection: Verbal consent was obtained for injection of Ultrasonic     Enhancing Agent following a discussion of risks and                      benefits.                      Endocardial visualization enhanced with 2 ml of Definity                      Ultrasound enhancing agent (Lot#:[id number] Exp.Date:12/2025).  NDC Number(s):      [id number]  UEA Reaction:       Patient had a headache after injection of Ultrasound                      Enhancing Agent.  Study Information:  Image quality for this study is technically difficult.    _______________________________________________________________________________________     CONCLUSIONS:      1. Technically difficult image quality.   2. Left ventricular cavity is normal in size. Left ventricular systolic function is moderately decreased with an ejection fraction of 31 % by Taylor's method of disks with an ejection fraction visually estimated at 25 to 30 %. Regional wall motion abnormalities present.   3. Multiple segmental abnormalities exist. See findings.   4. There is moderate (grade 2) left ventricular diastolic dysfunction, with elevated left ventricular filling pressure.   5. There is no evidence of a left ventricular thrombus.   6. Normal right ventricular cavity size and normal right ventricular systolic function.   7. Left atrium is severely dilated.   8. Moderate mitral regurgitation.   9. Mild tricuspid regurgitation.  10. Estimated pulmonary artery systolic pressure is 58 mmHg.  11. The inferior vena cava is normal in size measuring 1.10 cm in diameter, (normal <2.1cm) with abnormal inspiratory collapse (abnormal <50%) consistent with mildly elevated right atrial pressure (~8, range 5-10mmHg).  12. No pericardial effusion seen.  13. Compared to the transthoracic echocardiogram performed on 10/8/2024, further reduction in LVEF seen and PASP has increased.    ________________________________________________________________________________________  FINDINGS:     Left Ventricle:  After obtaining consent, Definity ultrasound enhancing agent was given for enhanced left ventricular opacification and improved delineation of the left ventricular endocardial borders. Complications from contrast: a headache. The left ventricular cavity is normal in size. Left ventricular systolic function is moderately decreased with a calculated ejection fraction of 31 % by the Taylor's biplane method of disks. There are regional wall motion abnormalities present. There is increased LV mass and eccentric hypertrophy. There is no evidence of a left ventricular thrombus. There is moderate (grade 2) leftventricular diastolic dysfunction, with elevated left ventricular filling pressure.  LV Wall Scoring: The entire apex, entire inferior septum, and mid and apical  anterior septum are akinetic. The basal and mid anterior wall, basal and mid  anterolateral wall, basal and mid inferior wall, basal and mid inferolateral  wall, and basal anteroseptal segment are hypokinetic.          Right Ventricle:  There is hypokinesis of the right ventricular free wall. The right ventricular cavity is normal in size and right ventricular systolic function is normal.     Left Atrium:  The left atrium is severely dilated with an indexed volume of 51.90 ml/m².     Right Atrium:  The right atrium is normal in size with an indexed volume of 14.06 ml/m² and an indexed area of 6.67 cm²/m².     Interatrial Septum:  There is no evidence of a patent foramen ovale by color flow Doppler.     Aortic Valve:  The aortic valve appears trileaflet. There is mild thickening of the aortic valve leaflets. There is fibrocalcific aortic valve sclerosis without stenosis. There is no evidence of aortic regurgitation.     Mitral Valve:  There is mitral valve thickening of the anterior and posterior leaflets. There is calcification of the mitral valve annulus. There is no mitralvalve stenosis. There is moderate mitral regurgitation.     Tricuspid Valve:  Structurally normal tricuspid valve with normal leaflet excursion. There is no evidence of tricuspid stenosis. There is mild tricuspid regurgitation. There is blunting of the hepatic vein flow consistent with elevated right atrial pressure. Estimated pulmonary artery systolic pressure is 58 mmHg, consistent with elevated pulmonary artery pressure.     Pulmonic Valve:  Structurally normal pulmonic valve with normal leaflet excursion. There is no pulmonic valve stenosis. There is trace pulmonic regurgitation.     Pulmonary Artery:  The branch pulmonary arteries are not well visualized.     Aorta:  The aortic root, ascending aorta and aortic arch appear normal in size.The aortic root at the sinuses of Valsalva is normal in size, measuring 3.20 cm (indexed 2.14 cm/m²). The ascending aorta is normal in size, measuring 3.20 cm (indexed 2.14 cm/m²). The aortic arch diameter is normal in size, measuring 2.5 cm (indexed1.67 cm/m²).     Pericardium:  No pericardial effusion seen.     Systemic Veins:  The inferior vena cava is normal in size measuring 1.10 cm in diameter, (normal <2.1cm) with abnormal inspiratory collapse (abnormal <50%) consistent with mildly elevated right atrial pressure (~8, range 5-10mmHg).  ____________________________________________________________________  QUANTITATIVE DATA:  Left Ventricle Measurements: (Indexed to BSA)     IVSd (2D):   1.2 cm  LVPWd (2D):  0.9 cm  LVIDd (2D):  4.6 cm  LVIDs (2D):  3.7 cm  LV Mass:     164 g  109.7 g/m²  LV Vol d, MOD A2C: 135.9 ml 90.97 ml/m²  LV Vol d, MOD A4C: 138.8 ml 92.93 ml/m²  LV Vol d, MOD BP:  139.7 ml 93.54 ml/m²  LV Vol s, MOD A2C: 85.9 ml  57.49 ml/m²  LV Vol s, MOD A4C: 107.1 ml 71.69 ml/m²  LV Vol s, MOD BP:  95.9 ml  64.23 ml/m²  LVOT SV MOD BP:    43.8 ml  LV EF% MOD BP:     31 %     MV E Vmax:    0.93 m/s  MV A Vmax:    1.04 m/s  MV E/A:       0.89  e' lateral:   8.05 cm/s  e' medial:    1.57 cm/s  E/e' lateral: 11.51  E/e' medial:  59.04  E/e' Average: 19.26  MV DT:        159 msec    Aorta Measurements: (Normal range) (Indexed to BSA)     Ao Root d    3.20 cm (2.7 - 3.3 cm) 2.14 cm/m²  Ao Asc prox: 3.20 cm                2.14 cm/m²  Ao Arch:     2.5 cm            Left Atrium Measurements: (Indexed to BSA)  LA Diam 2D: 3.74 cm         Right Ventricle Measurements: Right Atrial Measurements:     RV S' Vmax:      8.05 cm/s    RA Vol:           21.00 ml  RV Base (RVID1): 3.0 cm       RA Vol s, MOD A4C 21.3 ml  RV Mid (RVID2):  3.5 cm       RA Vol Index:     14.06 ml/m²       LVOT / RVOT/ Qp/Qs Data: (Indexed to BSA)  LVOT Diameter,s: 1.87 cm  LVOT Area:       2.74 cm²  LVOT Vmax:       0.73 m/s  LVOT VTI:        14.13 cm  LVOT peak grad:  2 mmHg  LVOT mean grad:  1.1 mmHg  LVOT SV:         38.8 ml  25.95 ml/m²    Aortic Valve Measurements:  AV Vmax:                1.8 m/s  AV Peak Gradient:       13.6 mmHg  AV Mean Gradient:       6.6 mmHg  AV VTI:                 34.6 cm  AV VTI Ratio:           0.41  AoV EOA, Contin:        1.12 cm²  AoV EOA, Contin i:      0.75 cm²/m²  AoV Dimensionless Index 0.41    Mitral Valve Measurements:     MV Vmax:       1.12 m/s  MV VTI (tips): 32.13 cm  MV Mean Grad:  2.1 mmHg  MV Peak Grad:  5.0 mmHg  MV E Vmax:     0.9 m/s  MV AVmax:     1.0 m/s  MV E/A:        0.9       Tricuspid Valve Measurements:     TR Vmax:          3.5 m/s  TR Peak Gradient: 49.8 mmHg  RA Pressure:      8 mmHg  PASP:             58 mmHg       Pulmonic Valve Measurments:  PV Vmax:          1.0 m/s  PVPeak Gradient: 3.7 mmHg    < end of copied text >   (pt's son at bedside who insisted on acting as )    HPI: The pt is a 75 yo female (known to our service from previous admissions) PMH + DM + HTN + CKD(III) who presented to hospital several days ago with progressive dyspnea accompanied by increased edema and weight gain.  Pt subsequently diagnosed with decompensated CHF and has been receiving diuretic dosing based on CardioMEMS data.  We are now asked to evaluate for possible RRT as patient continues to have signs of significant fluid overload despite aggressive diuresis.        PAST MEDICAL & SURGICAL HISTORY:  DM (diabetes mellitus)      HTN (hypertension)      H/O gastroesophageal reflux (GERD)      Cardiac pacemaker  MICRA for mobitz type 11      CVA (cerebrovascular accident)  resdiual right sided weakness      CVA (cerebrovascular accident)      PAD (peripheral artery disease)      Wound of right leg      History of benign brain tumor      Cataract      History of biopsy      Cardiac pacemaker      S/P angiogram of extremity        FAMILY HISTORY:  No CKD noted      Social History:  No current tobacco; no etoh  nor drug abuse      MEDICATIONS  (STANDING):  acetaminophen     Tablet .. 975 milliGRAM(s) Oral every 8 hours  aspirin  chewable 81 milliGRAM(s) Oral daily  atorvastatin 80 milliGRAM(s) Oral at bedtime  buMETAnide Infusion 2 mG/Hr (10 mL/Hr) IV Continuous <Continuous>  dextrose 5%. 1000 milliLiter(s) (50 mL/Hr) IV Continuous <Continuous>  dextrose 5%. 1000 milliLiter(s) (100 mL/Hr) IV Continuous <Continuous>  dextrose 50% Injectable 25 Gram(s) IV Push once  dextrose 50% Injectable 12.5 Gram(s) IV Push once  dextrose 50% Injectable 25 Gram(s) IV Push once  gabapentin 400 milliGRAM(s) Oral daily  glucagon  Injectable 1 milliGRAM(s) IntraMuscular once  hydrALAZINE 25 milliGRAM(s) Oral three times a day  insulin glargine Injectable (LANTUS) 5 Unit(s) SubCutaneous at bedtime  insulin lispro (ADMELOG) corrective regimen sliding scale   SubCutaneous three times a day before meals  lidocaine   4% Patch 1 Patch Transdermal every 24 hours  methocarbamol 500 milliGRAM(s) Oral two times a day  metoprolol succinate ER 50 milliGRAM(s) Oral daily  pantoprazole    Tablet 40 milliGRAM(s) Oral before breakfast  sacubitril 49 mG/valsartan 51 mG 1 Tablet(s) Oral two times a day  sertraline 25 milliGRAM(s) Oral daily  ticagrelor 60 milliGRAM(s) Oral every 12 hours    MEDICATIONS  (PRN):  aluminum hydroxide/magnesium hydroxide/simethicone Suspension 30 milliLiter(s) Oral every 4 hours PRN Dyspepsia  dextrose Oral Gel 15 Gram(s) Oral once PRN Blood Glucose LESS THAN 70 milliGRAM(s)/deciliter  loperamide 2 milliGRAM(s) Oral two times a day PRN Diarrhea      Allergies    No Known Allergies    Intolerances        REVIEW OF SYSTEMS:  CONSTITUTIONAL: + fatigue  EYES: No eye pain, visual disturbances, or discharge  ENMT:  No difficulty hearing, tinnitus, vertigo; No sinus or throat pain  NECK: No pain or stiffness  BREASTS: No pain, masses, or nipple discharge  RESPIRATORY: No cough, wheezing, chills or hemoptysis; No shortness of breath  CARDIOVASCULAR: No chest pain, palpitations, dizziness, or leg swelling  GASTROINTESTINAL: No abdominal or epigastric pain. No nausea, vomiting, or hematemesis; No diarrhea or constipation. No melena or hematochezia.  GENITOURINARY: No dysuria, frequency, hematuria, or incontinence  NEUROLOGICAL: No headaches, memory loss, loss of strength, numbness, or tremors  SKIN: No itching, burning, rashes, or lesions   MUSCULOSKELETAL: No joint pain or swelling; No muscle, back, or extremity pain  PSYCHIATRIC: No depression, anxiety, mood swings, or difficulty sleeping        Vital Signs Last 24 Hrs  T(C): 36.8 (21 Apr 2025 05:00), Max: 36.9 (20 Apr 2025 08:02)  T(F): 98.3 (21 Apr 2025 05:00), Max: 98.4 (20 Apr 2025 08:02)  HR: 82 (21 Apr 2025 05:00) (62 - 82)  BP: 160/78 (21 Apr 2025 05:00) (147/80 - 161/84)  BP(mean): --  RR: 18 (21 Apr 2025 05:00) (16 - 18)  SpO2: 98% (21 Apr 2025 05:00) (97% - 99%)    Parameters below as of 21 Apr 2025 05:00  Patient On (Oxygen Delivery Method): room air        PHYSICAL EXAM:  GENERAL: Fatigued but comfortable  HEAD: Atraumatic, Normocephalic  EYES: Conjunctiva and sclera clear  ENMT:  Moist mucous membranes  NECK: Supple, No JVD  NERVOUS SYSTEM:  Alert & Oriented X3, intact and symmetric  CHEST/LUNG: Diminished BS noted  HEART: Regular rate and rhythm; No murmurs, rubs, or gallops  ABDOMEN: Soft, Nontender, +BS  EXTREMITIES: + dependent edema  SKIN: No rashes or lesions      LABS:                        9.0    10.39 )-----------( 401      ( 20 Apr 2025 07:15 )             27.9     04-20    135  |  98  |  28.4[H]  ----------------------------<  149[H]  3.8   |  20.0[L]  |  1.79[H]    Ca    8.1[L]      20 Apr 2025 07:15        Urinalysis Basic - ( 20 Apr 2025 07:15 )    Color: x / Appearance: x / SG: x / pH: x  Gluc: 149 mg/dL / Ketone: x  / Bili: x / Urobili: x   Blood: x / Protein: x / Nitrite: x   Leuk Esterase: x / RBC: x / WBC x   Sq Epi: x / Non Sq Epi: x / Bacteria: x    Protein/Creatinine Ratio Calculation: 0.6 Ratio (04.11.25      RADIOLOGY & ADDITIONAL TESTS:  < from: Xray Chest 1 View- PORTABLE-Urgent (Xray Chest 1 View- PORTABLE-Urgent .) (04.19.25 @ 23:36) >  ACC: [id number] EXAM:  XR CHEST PORTABLE URGENT 1V   ORDERED BY: CHERYL ESPINAL     PROCEDURE DATE:  04/19/2025          INTERPRETATION:  Portable AP chest radiograph    COMPARISON: 4/10/2025 chest X or chest x-ray.    CLINICAL INFORMATION: 4/10/2025 chest x-ray.    FINDINGS:  CATHETERS AND TUBES: None    PULMONARY:  Mild bilateral interstitial lung disease without large airspace   consolidation or pleural effusion.  No pneumothorax.      HEART/VASCULAR: The heart size and mediastinum configuration are within   the limits of normal. Micra Transcatheter Pacing System cardiac  device   within RIGHT ventricle.    BONES: The visualized osseous thorax is intact.    IMPRESSION:  No large airspace consolidation or pleural effusion.    < end of copied text >      < from: TTE W or WO Ultrasound Enhancing Agent (03.21.25 @ 17:56) >    TRANSTHORACIC ECHOCARDIOGRAM REPORT  ________________________________________________________________________________                                      _______       Pt. Name:       SHIREEN CASH  Study Date:    3/21/2025  MRN:            [id number]  YOB: 1950  Accession #:    [id number]   Age:           74 years  Account#:       [id number]  Gender:        F  Heart Rate:     63 bpm      Height:        51.00 in (129.54 cm)  Rhythm:                     Weight:        156.20 lb (70.85 kg)  Blood Pressure: 149/74 mmHg BSA/BMI:       1.49 m² / 42.22 kg/m²  ________________________________________________________________________________________  Referring Physician:    [id number] Liam Raymond  Interpreting Physician: Jim Wilkinson DO  Primary Sonographer:    Nichol Aguiar    CPT:                ECHO TTE WITH CON COMP W DOPP - .m;DEFINITY ECHO                      CONTRAST PER ML - .m  Indication(s):      Chest pain, unspecified - R07.9  Procedure:          Transthoracicechocardiogram with 2-D, M-mode and complete                      spectral and color flow Doppler.  Ordering Location:  [facility]  Admission Status:   Inpatient  Contrast Injection: Verbal consent was obtained for injection of Ultrasonic     Enhancing Agent following a discussion of risks and                      benefits.                      Endocardial visualization enhanced with 2 ml of Definity                      Ultrasound enhancing agent (Lot#:[id number] Exp.Date:12/2025).  NDC Number(s):      [id number]  UEA Reaction:       Patient had a headache after injection of Ultrasound                      Enhancing Agent.  Study Information:  Image quality for this study is technically difficult.    _______________________________________________________________________________________     CONCLUSIONS:      1. Technically difficult image quality.   2. Left ventricular cavity is normal in size. Left ventricular systolic function is moderately decreased with an ejection fraction of 31 % by Taylor's method of disks with an ejection fraction visually estimated at 25 to 30 %. Regional wall motion abnormalities present.   3. Multiple segmental abnormalities exist. See findings.   4. There is moderate (grade 2) left ventricular diastolic dysfunction, with elevated left ventricular filling pressure.   5. There is no evidence of a left ventricular thrombus.   6. Normal right ventricular cavity size and normal right ventricular systolic function.   7. Left atrium is severely dilated.   8. Moderate mitral regurgitation.   9. Mild tricuspid regurgitation.  10. Estimated pulmonary artery systolic pressure is 58 mmHg.  11. The inferior vena cava is normal in size measuring 1.10 cm in diameter, (normal <2.1cm) with abnormal inspiratory collapse (abnormal <50%) consistent with mildly elevated right atrial pressure (~8, range 5-10mmHg).  12. No pericardial effusion seen.  13. Compared to the transthoracic echocardiogram performed on 10/8/2024, further reduction in LVEF seen and PASP has increased.    ________________________________________________________________________________________  FINDINGS:     Left Ventricle:  After obtaining consent, Definity ultrasound enhancing agent was given for enhanced left ventricular opacification and improved delineation of the left ventricular endocardial borders. Complications from contrast: a headache. The left ventricular cavity is normal in size. Left ventricular systolic function is moderately decreased with a calculated ejection fraction of 31 % by the Taylor's biplane method of disks. There are regional wall motion abnormalities present. There is increased LV mass and eccentric hypertrophy. There is no evidence of a left ventricular thrombus. There is moderate (grade 2) leftventricular diastolic dysfunction, with elevated left ventricular filling pressure.  LV Wall Scoring: The entire apex, entire inferior septum, and mid and apical  anterior septum are akinetic. The basal and mid anterior wall, basal and mid  anterolateral wall, basal and mid inferior wall, basal and mid inferolateral  wall, and basal anteroseptal segment are hypokinetic.          Right Ventricle:  There is hypokinesis of the right ventricular free wall. The right ventricular cavity is normal in size and right ventricular systolic function is normal.     Left Atrium:  The left atrium is severely dilated with an indexed volume of 51.90 ml/m².     Right Atrium:  The right atrium is normal in size with an indexed volume of 14.06 ml/m² and an indexed area of 6.67 cm²/m².     Interatrial Septum:  There is no evidence of a patent foramen ovale by color flow Doppler.     Aortic Valve:  The aortic valve appears trileaflet. There is mild thickening of the aortic valve leaflets. There is fibrocalcific aortic valve sclerosis without stenosis. There is no evidence of aortic regurgitation.     Mitral Valve:  There is mitral valve thickening of the anterior and posterior leaflets. There is calcification of the mitral valve annulus. There is no mitralvalve stenosis. There is moderate mitral regurgitation.     Tricuspid Valve:  Structurally normal tricuspid valve with normal leaflet excursion. There is no evidence of tricuspid stenosis. There is mild tricuspid regurgitation. There is blunting of the hepatic vein flow consistent with elevated right atrial pressure. Estimated pulmonary artery systolic pressure is 58 mmHg, consistent with elevated pulmonary artery pressure.     Pulmonic Valve:  Structurally normal pulmonic valve with normal leaflet excursion. There is no pulmonic valve stenosis. There is trace pulmonic regurgitation.     Pulmonary Artery:  The branch pulmonary arteries are not well visualized.     Aorta:  The aortic root, ascending aorta and aortic arch appear normal in size.The aortic root at the sinuses of Valsalva is normal in size, measuring 3.20 cm (indexed 2.14 cm/m²). The ascending aorta is normal in size, measuring 3.20 cm (indexed 2.14 cm/m²). The aortic arch diameter is normal in size, measuring 2.5 cm (indexed1.67 cm/m²).     Pericardium:  No pericardial effusion seen.     Systemic Veins:  The inferior vena cava is normal in size measuring 1.10 cm in diameter, (normal <2.1cm) with abnormal inspiratory collapse (abnormal <50%) consistent with mildly elevated right atrial pressure (~8, range 5-10mmHg).  ____________________________________________________________________  QUANTITATIVE DATA:  Left Ventricle Measurements: (Indexed to BSA)     IVSd (2D):   1.2 cm  LVPWd (2D):  0.9 cm  LVIDd (2D):  4.6 cm  LVIDs (2D):  3.7 cm  LV Mass:     164 g  109.7 g/m²  LV Vol d, MOD A2C: 135.9 ml 90.97 ml/m²  LV Vol d, MOD A4C: 138.8 ml 92.93 ml/m²  LV Vol d, MOD BP:  139.7 ml 93.54 ml/m²  LV Vol s, MOD A2C: 85.9 ml  57.49 ml/m²  LV Vol s, MOD A4C: 107.1 ml 71.69 ml/m²  LV Vol s, MOD BP:  95.9 ml  64.23 ml/m²  LVOT SV MOD BP:    43.8 ml  LV EF% MOD BP:     31 %     MV E Vmax:    0.93 m/s  MV A Vmax:    1.04 m/s  MV E/A:       0.89  e' lateral:   8.05 cm/s  e' medial:    1.57 cm/s  E/e' lateral: 11.51  E/e' medial:  59.04  E/e' Average: 19.26  MV DT:        159 msec    Aorta Measurements: (Normal range) (Indexed to BSA)     Ao Root d    3.20 cm (2.7 - 3.3 cm) 2.14 cm/m²  Ao Asc prox: 3.20 cm                2.14 cm/m²  Ao Arch:     2.5 cm            Left Atrium Measurements: (Indexed to BSA)  LA Diam 2D: 3.74 cm         Right Ventricle Measurements: Right Atrial Measurements:     RV S' Vmax:      8.05 cm/s    RA Vol:           21.00 ml  RV Base (RVID1): 3.0 cm       RA Vol s, MOD A4C 21.3 ml  RV Mid (RVID2):  3.5 cm       RA Vol Index:     14.06 ml/m²       LVOT / RVOT/ Qp/Qs Data: (Indexed to BSA)  LVOT Diameter,s: 1.87 cm  LVOT Area:       2.74 cm²  LVOT Vmax:       0.73 m/s  LVOT VTI:        14.13 cm  LVOT peak grad:  2 mmHg  LVOT mean grad:  1.1 mmHg  LVOT SV:         38.8 ml  25.95 ml/m²    Aortic Valve Measurements:  AV Vmax:                1.8 m/s  AV Peak Gradient:       13.6 mmHg  AV Mean Gradient:       6.6 mmHg  AV VTI:                 34.6 cm  AV VTI Ratio:           0.41  AoV EOA, Contin:        1.12 cm²  AoV EOA, Contin i:      0.75 cm²/m²  AoV Dimensionless Index 0.41    Mitral Valve Measurements:     MV Vmax:       1.12 m/s  MV VTI (tips): 32.13 cm  MV Mean Grad:  2.1 mmHg  MV Peak Grad:  5.0 mmHg  MV E Vmax:     0.9 m/s  MV AVmax:     1.0 m/s  MV E/A:        0.9       Tricuspid Valve Measurements:     TR Vmax:          3.5 m/s  TR Peak Gradient: 49.8 mmHg  RA Pressure:      8 mmHg  PASP:             58 mmHg       Pulmonic Valve Measurments:  PV Vmax:          1.0 m/s  PVPeak Gradient: 3.7 mmHg    < end of copied text >

## 2025-04-21 NOTE — PROGRESS NOTE ADULT - ASSESSMENT
Gen Cards: Dr. Allen  HF: Dr. Polanco  IC: Dr. Bajwa    75 y/o female with PMH of ICM/HFrEF (LVEF 35-40%), CAD s/p PCI, Mobitz II s/p PPM (Nereyda, MDT), HTN, HLD, PAD with chronic right LE wound, DM2, and CKD3, who was BIBA from CHF clinic due to hypervolemia, weight gain of ~20 lbs in the past 9 days despite escalation of PO diuretics.      She was recently admitted on 3/19/25 with c/o CP and found to have NSTEMI. She underwent LHC on 3/21 which showed mid-distal LAD severe stenosis, OM1 and OM2 severe stenosis, mid ISR 50%, distal ISR 99%, s/p PCI with 1 ZEINAB to RCA.  On 3/22 she had a fever with leukocytosis and her blood cultures were positive for E. Coli/ESBL. Her CT A/P showed left pyelonephritis. She also had an BRADY for which her Entresto was held. She was discharged home 3/31 and her CardioMEMS was not at goal and had been rising. She was advised to increase Torsemide 40 mg daily to twice daily on 4/4. Despite this, she has continued to gain weight and reports poor UOP response.     Admission labwork: Na 125, K 4.6, BUN 37.7, Cr 1.94, eGFR 27, Mg 2.3, proBNP 52794!  CXR: increased interstitial markings bilaterally, cardiomegaly.   On 4/10 she developed a fever, tmax 100.4F. RVP negative, empirically started on IV Zosyn.  4/13 s/p right knee fluid aspiration (s/p recent trauma/fall).  4/15 initiated on continuous Bumex infusion for inadequate response to intermittent dosing.    Prior Cardiac Testing:  TTE 3/21/25: LVEF 25-30%, grade II DD, normal RV size/function, severe LAE, moderate MR, mild TR, PASP 58 mmHg ( RAP 8 mmHg), no evidence of LV thrombus, multiple segmental abnormalities/regional WMA.    CardioMEMS PAD (goal 14):  4/20: 31 4/18: 27  4/17: 27 (in chair ~30 degrees).  4/14: 30-33  4/11: 32  4/9: 32  3/27: 26  3/24: 23  3/21: 22

## 2025-04-21 NOTE — PROGRESS NOTE ADULT - ASSESSMENT
74yoF with a history of coronary artery disease, heart failure, chronic kidney disease, hypertension, diabetes, and anemia who was recently hospitalized for NSTEMI with PCI and bacteremia who presented with dyspnea on exertion and weight gain. Continues on Bumex drip as per cardiology with plans for additional diuretics with metolazone / Bumex IV if labs support. HF considering repeat Heart cath, following strict I/O/ Mems data.     #Acute on chronic systolic heart failure  - Recent echocardiogram noted ejection fraction of 31% with multiple segmental abnormalities  - c/w IV bumex per HF  plan 4/21 for Metolazone, Bumex IV push, if given will hold Bumex drip and monitor output   - Strict in and out, goal net negative 2-2.5 L daily   - Daily weights  - On metoprolol, Imdur, spironolactone, Entresto, HDZN,   - Heart Failure on board recs appreciated, as above, increasing diuretics will monitor output   - pt with reproducible chest wall pain, started on pain regimen, remains intermittent with improvement with GI cocktail / pain medications, continue to monitor   per HF given pain will consider repeat Cath    #Right knee pain  - knee xray showing effusion, possible in setting of fall  - pain regimen adjusted  - CT knee result noted  - ortho consulted, sp aspiration fluid neg for crystals/infections  ROM improving     #Fever  noted febrile episode 4/19 overnight to 100.9, repeat RVP, s/p Abxs with Zosyn, continue to monitor off Abx at this time   - CXR noted with ?Atelectasis vs edema vs infx  - sp Zosyn x 5 days   - swallow eval result noted, diet advanced   - Bcx NGTD    #Diarrhea  - possibly abx related  - GI pcr neg, PRN imodium started    #Coronary artery disease  - Continue on aspirin, ticagrelor, and atorvastatin  - Recent cardiac catheterization with PCI, Brillinta dosage increased to 90 BID from 60 as per HF  - initial trop 140 -> 118  - pt with recurrent chest pain, high up in chest, trop downtrended   - c/w maalox, pain improves with GI cocktail, as above consideration for repeat heart cath this admission     #Chronic kidney disease  - Renal function improved from prior values  - Continue to monitor while on diuresis    #Hyponatremia  improved   - Hypervolemic on presentation  - Bumetanide for diuresis, may adjust as above addition of metolazone / bumex IV push, may hold bumex drip and monitor ouptut     #Diabetes  - Insulin coverage  - Close monitoring of blood glucose levels  - Gabapentin for neuropathy  - need Glucometer on discharge     DVT ppx: ASA/Brilinta   Dispo: Pending HF optimization and weaning off Bumex drip   PT eval - [facility], but family prefers home

## 2025-04-21 NOTE — PROGRESS NOTE ADULT - PROBLEM SELECTOR PLAN 2
- Most recent PCI 3/21/25  - Continue BB as above and will add Imdur. Patient was also on Ranexa at home.  - Continue daily ASA and Brilinta (unclear why patient is on 60 mg BID, d/w interventional and will change back to 90 mg BID).  - Continue high intensity statin therapy - Atorvastatin 80mg nightly.

## 2025-04-21 NOTE — PROGRESS NOTE ADULT - SUBJECTIVE AND OBJECTIVE BOX
[facility] - PROGRESS NOTE  [address] [place] [address]  Office Phone: [phone]/Fax: [phone]  Service/On Call Phone [phone]  _______________________________________________________________________________________________________    Subjective:  - Patient endorses CP that started 2 days ago. She continues to endorse SOB and orthopnea.     Medications:  acetaminophen     Tablet .. 975 milliGRAM(s) Oral every 8 hours  aluminum hydroxide/magnesium hydroxide/simethicone Suspension 30 milliLiter(s) Oral every 4 hours PRN  aspirin  chewable 81 milliGRAM(s) Oral daily  atorvastatin 80 milliGRAM(s) Oral at bedtime  buMETAnide Infusion 2 mG/Hr IV Continuous <Continuous>  dextrose 5%. 1000 milliLiter(s) IV Continuous <Continuous>  dextrose 5%. 1000 milliLiter(s) IV Continuous <Continuous>  dextrose 50% Injectable 25 Gram(s) IV Push once  dextrose 50% Injectable 12.5 Gram(s) IV Push once  dextrose 50% Injectable 25 Gram(s) IV Push once  dextrose Oral Gel 15 Gram(s) Oral once PRN  gabapentin 400 milliGRAM(s) Oral daily  glucagon  Injectable 1 milliGRAM(s) IntraMuscular once  hydrALAZINE 25 milliGRAM(s) Oral three times a day  insulin glargine Injectable (LANTUS) 5 Unit(s) SubCutaneous at bedtime  insulin lispro (ADMELOG) corrective regimen sliding scale   SubCutaneous three times a day before meals  lidocaine   4% Patch 1 Patch Transdermal every 24 hours  loperamide 2 milliGRAM(s) Oral two times a day PRN  methocarbamol 500 milliGRAM(s) Oral two times a day  metoprolol succinate ER 50 milliGRAM(s) Oral daily  pantoprazole    Tablet 40 milliGRAM(s) Oral before breakfast  sacubitril 49 mG/valsartan 51 mG 1 Tablet(s) Oral two times a day  sertraline 25 milliGRAM(s) Oral daily  ticagrelor 60 milliGRAM(s) Oral every 12 hours      ICU Vital Signs Last 24 Hrs  T(C): 36.7, Max: 36.8 ( @ 23:23)  HR: 61 (61 - 82)  BP: 147/83 (147/80 - 161/84)  BP(mean): --  ABP: --  ABP(mean): --  RR: 18 (16 - 18)  SpO2: 98% (97% - 99%)    Weight in k.9 (25)  Weight in k.7 (25)      I&O's Summary Last 24 Hrs    IN: 890 mL / OUT: 1000 mL / NET: -110 mL      Tele:  60s    Physical Exam:    General: NAD.  Neck: JVP severely elevated.  Respiratory: Crackles bilaterally.  CV: RRR. Normal S1 and S2. No murmurs, rub, or gallops. Radial pulses normal.  Abdomen: Soft, non-distended, non-tender  Extremities: Warm, 1+ BLE edema  Neurology: Non-focal, alert and oriented times three.   Psych: Affect normal    Labs:    ( 25 @ 07:15 )               9.0    10.39 )--------( 401                  27.9     ( 25 @ 07:15 )     135  |  98  |  28.4  ---------------------<  149  3.8  |  20.0  |  1.79    Ca 8.1  Phos x   Mg x     ( 25 @ 16:15 )  TropHS 118   / CK x     / CKMB x      ( 25 @ 10:04 )  TropHS 140   / CK x     / CKMB x

## 2025-04-21 NOTE — CONSULT NOTE ADULT - ASSESSMENT
75 yo female (known to our service) + CKD(III) + ICM (EF 25-30%) admitted with decompensated CHF  Despite ongoing diuresis based on CardioMEMS data she has failed to reach her goal  - pt to continue Bumex infusion for now and Entresto as per cardiology  - can consider RRT to augment UF if necessary   75 yo female (known to our service) + CKD(III) + ICM (EF 25-30%) admitted with decompensated CHF  Despite ongoing diuresis based on CardioMEMS data she has failed to reach her goal  - pt to continue Bumex infusion for now and Entresto as per cardiology  - can consider RRT to augment UF if medical management proves inadequate (pt and son aware)

## 2025-04-21 NOTE — PROGRESS NOTE ADULT - PROBLEM SELECTOR PLAN 1
- ICM, LVEF 25-30% on 3/21 (previously 35-40%). Repeat TTE ordered today.  - Clinically appears volume overloaded on exam w/warm extremities.  - Serum proBNP >68K. Repeat pro-BNP.  - Hyponatremia likely 2/2 hypervolemia - improving.  - CardioMEMS PAD rising despite aggressive diuresis with Bumex gtt (goal closer to 14).   - Of note, Bumex gtt was not increased to 2 mg/hr overnight and is currently receiving 1 mg/hr.  - Diuretics: STAT labs ordered and if stable, will give 1 dose of metolazone 5 mg and Bumex 4 mg IV 30 minutes after. Will hold Bumex gtt for now and redose diuretics depending on her response to metolazone. Goal net negative 2-2.5L/day.  - Please check BMP and Mag BID. Maintain K>4, Mg >2.  - GDMT: Will add Imdur 30 mg daily and spironolactone 25 mg daily. Continue Entresto 49-51 mg BID, HDZN 25 mg TID and Toprol-XL 50 mg nightly. No Farxiga 2/2 h/o pyelonephritis.   - Device: s/p Micra PPM with chronic . Consider CRT in future?  - Low Na, 1.5L FR diet recommended  - Please document strict I&Os and daily standing weight.  - CXR ordered.

## 2025-04-22 LAB
ALBUMIN SERPL ELPH-MCNC: 3.1 G/DL — LOW (ref 3.3–5.2)
ALP SERPL-CCNC: 153 U/L — HIGH (ref 40–120)
ALT FLD-CCNC: 12 U/L — SIGNIFICANT CHANGE UP
ANION GAP SERPL CALC-SCNC: 16 MMOL/L — SIGNIFICANT CHANGE UP (ref 5–17)
AST SERPL-CCNC: 22 U/L — SIGNIFICANT CHANGE UP
BILIRUB SERPL-MCNC: 0.7 MG/DL — SIGNIFICANT CHANGE UP (ref 0.4–2)
BUN SERPL-MCNC: 34.9 MG/DL — HIGH (ref 8–20)
CALCIUM SERPL-MCNC: 8.5 MG/DL — SIGNIFICANT CHANGE UP (ref 8.4–10.5)
CHLORIDE SERPL-SCNC: 95 MMOL/L — LOW (ref 96–108)
CO2 SERPL-SCNC: 22 MMOL/L — SIGNIFICANT CHANGE UP (ref 22–29)
CREAT SERPL-MCNC: 1.78 MG/DL — HIGH (ref 0.5–1.3)
EGFR: 30 ML/MIN/1.73M2 — LOW
EGFR: 30 ML/MIN/1.73M2 — LOW
GLUCOSE BLDC GLUCOMTR-MCNC: 136 MG/DL — HIGH (ref 70–99)
GLUCOSE BLDC GLUCOMTR-MCNC: 223 MG/DL — HIGH (ref 70–99)
GLUCOSE BLDC GLUCOMTR-MCNC: 225 MG/DL — HIGH (ref 70–99)
GLUCOSE BLDC GLUCOMTR-MCNC: 253 MG/DL — HIGH (ref 70–99)
GLUCOSE SERPL-MCNC: 228 MG/DL — HIGH (ref 70–99)
HCT VFR BLD CALC: 31 % — LOW (ref 34.5–45)
HGB BLD-MCNC: 10 G/DL — LOW (ref 11.5–15.5)
MCHC RBC-ENTMCNC: 28.7 PG — SIGNIFICANT CHANGE UP (ref 27–34)
MCHC RBC-ENTMCNC: 32.3 G/DL — SIGNIFICANT CHANGE UP (ref 32–36)
MCV RBC AUTO: 88.8 FL — SIGNIFICANT CHANGE UP (ref 80–100)
NRBC # BLD AUTO: 0.02 K/UL — HIGH (ref 0–0)
NRBC # FLD: 0.02 K/UL — HIGH (ref 0–0)
NRBC BLD AUTO-RTO: 0 /100 WBCS — SIGNIFICANT CHANGE UP (ref 0–0)
PLATELET # BLD AUTO: 416 K/UL — HIGH (ref 150–400)
PMV BLD: 9.5 FL — SIGNIFICANT CHANGE UP (ref 7–13)
POTASSIUM SERPL-MCNC: 3.9 MMOL/L — SIGNIFICANT CHANGE UP (ref 3.5–5.3)
POTASSIUM SERPL-SCNC: 3.9 MMOL/L — SIGNIFICANT CHANGE UP (ref 3.5–5.3)
PROT SERPL-MCNC: 6.8 G/DL — SIGNIFICANT CHANGE UP (ref 6.6–8.7)
RBC # BLD: 3.49 M/UL — LOW (ref 3.8–5.2)
RBC # FLD: 18.1 % — HIGH (ref 10.3–14.5)
SODIUM SERPL-SCNC: 133 MMOL/L — LOW (ref 135–145)
WBC # BLD: 11.05 K/UL — HIGH (ref 3.8–10.5)
WBC # FLD AUTO: 11.05 K/UL — HIGH (ref 3.8–10.5)

## 2025-04-22 PROCEDURE — 99233 SBSQ HOSP IP/OBS HIGH 50: CPT

## 2025-04-22 PROCEDURE — 99231 SBSQ HOSP IP/OBS SF/LOW 25: CPT

## 2025-04-22 PROCEDURE — 93451 RIGHT HEART CATH: CPT | Mod: 26

## 2025-04-22 RX ORDER — SACUBITRIL AND VALSARTAN 6; 6 MG/1; MG/1
1 PELLET ORAL
Refills: 0 | Status: DISCONTINUED | OUTPATIENT
Start: 2025-04-22 | End: 2025-05-03

## 2025-04-22 RX ADMIN — LIDOCAINE HYDROCHLORIDE 1 PATCH: 20 JELLY TOPICAL at 13:19

## 2025-04-22 RX ADMIN — INSULIN LISPRO 4: 100 INJECTION, SOLUTION INTRAVENOUS; SUBCUTANEOUS at 08:25

## 2025-04-22 RX ADMIN — SACUBITRIL AND VALSARTAN 1 TABLET(S): 6; 6 PELLET ORAL at 17:52

## 2025-04-22 RX ADMIN — Medication 975 MILLIGRAM(S): at 06:09

## 2025-04-22 RX ADMIN — METHOCARBAMOL 500 MILLIGRAM(S): 500 TABLET, FILM COATED ORAL at 05:09

## 2025-04-22 RX ADMIN — TICAGRELOR 90 MILLIGRAM(S): 90 TABLET ORAL at 05:10

## 2025-04-22 RX ADMIN — Medication 25 MILLIGRAM(S): at 21:59

## 2025-04-22 RX ADMIN — LIDOCAINE HYDROCHLORIDE 1 PATCH: 20 JELLY TOPICAL at 00:01

## 2025-04-22 RX ADMIN — SACUBITRIL AND VALSARTAN 1 TABLET(S): 6; 6 PELLET ORAL at 05:10

## 2025-04-22 RX ADMIN — METOPROLOL SUCCINATE 50 MILLIGRAM(S): 50 TABLET, EXTENDED RELEASE ORAL at 05:09

## 2025-04-22 RX ADMIN — Medication 81 MILLIGRAM(S): at 13:09

## 2025-04-22 RX ADMIN — INSULIN GLARGINE-YFGN 5 UNIT(S): 100 INJECTION, SOLUTION SUBCUTANEOUS at 22:05

## 2025-04-22 RX ADMIN — GABAPENTIN 400 MILLIGRAM(S): 400 CAPSULE ORAL at 13:07

## 2025-04-22 RX ADMIN — BUMETANIDE 132 MILLIGRAM(S): 1 TABLET ORAL at 22:02

## 2025-04-22 RX ADMIN — TICAGRELOR 90 MILLIGRAM(S): 90 TABLET ORAL at 17:52

## 2025-04-22 RX ADMIN — ATORVASTATIN CALCIUM 80 MILLIGRAM(S): 80 TABLET, FILM COATED ORAL at 21:59

## 2025-04-22 RX ADMIN — Medication 975 MILLIGRAM(S): at 13:09

## 2025-04-22 RX ADMIN — INSULIN LISPRO 6: 100 INJECTION, SOLUTION INTRAVENOUS; SUBCUTANEOUS at 12:24

## 2025-04-22 RX ADMIN — Medication 25 MILLIGRAM(S): at 13:08

## 2025-04-22 RX ADMIN — ISOSORBIDE MONONITRATE 30 MILLIGRAM(S): 60 TABLET, EXTENDED RELEASE ORAL at 13:08

## 2025-04-22 RX ADMIN — Medication 25 MILLIGRAM(S): at 05:10

## 2025-04-22 RX ADMIN — METHOCARBAMOL 500 MILLIGRAM(S): 500 TABLET, FILM COATED ORAL at 17:52

## 2025-04-22 RX ADMIN — Medication 25 MILLIGRAM(S): at 05:09

## 2025-04-22 RX ADMIN — Medication 975 MILLIGRAM(S): at 22:59

## 2025-04-22 RX ADMIN — Medication 40 MILLIGRAM(S): at 05:10

## 2025-04-22 RX ADMIN — SERTRALINE 25 MILLIGRAM(S): 100 TABLET, FILM COATED ORAL at 13:06

## 2025-04-22 RX ADMIN — LIDOCAINE HYDROCHLORIDE 1 PATCH: 20 JELLY TOPICAL at 19:00

## 2025-04-22 RX ADMIN — Medication 975 MILLIGRAM(S): at 21:59

## 2025-04-22 RX ADMIN — Medication 975 MILLIGRAM(S): at 05:09

## 2025-04-22 RX ADMIN — BUMETANIDE 132 MILLIGRAM(S): 1 TABLET ORAL at 09:56

## 2025-04-22 NOTE — PROCEDURE NOTE - ADDITIONAL PROCEDURE DETAILS
Battery: 6.3 years remaining    High RV pacing burden. Attempted to optimize RA and RV synchrony. Ultimately unsuccessful despite multiple attempts in multiple sensing vectors. Very small A4 amplitude which cannot be circumvented.     This device does not record events or arrhythmias.     Reprogrammin. Changed mode from VDD  to VDD

## 2025-04-22 NOTE — PROGRESS NOTE ADULT - PROBLEM SELECTOR PLAN 1
- ICM, most recent TTE on 4/21/25 showed further decline of LVEF to 20-25%  - Clinically appears mildly volume overloaded on exam w/warm extremities. Hypertensive.  - Serum proBNP >68K on admission. Repeat pro-BNP > 70k on 4/21.  - Hyponatremia improved with diuresis.  - CardioMEMS PAD rising despite aggressive diuresis with Bumex gtt (goal closer to 14).   - Diuretics: Continue Bumex 4 mg IV BID for now and will adjust based upon RHC.  - Please check BMP and Mag BID. Maintain K>4, Mg >2.  - GDMT: Increase Entresto to  mg BID. Continue HDZN 25 mg TID for now but will eventually wean off and uptitrate Imdur and Toprol-XL (can consider switching to Coreg for better BP control). Continue spironolactone 25 mg daily.  No Farxiga 2/2 h/o pyelonephritis.   - Device: s/p Micra PPM with chronic . EP consulted for consideration of device upgrade to CRT (attempted to optimize RA and RV synchrony but ultimately unsuccessful, see device interrogation note).  - Low Na, 1.5L FR diet recommended  - Please document strict I&Os and daily standing weight.  - Will schedule for RHC and CardioMEMS recalibration today.

## 2025-04-22 NOTE — CHART NOTE - NSCHARTNOTEFT_GEN_A_CORE
Now s/p RHC via RIJ with Dr. Ely tolerated procedure well. Pt arrived to recovery in NAD and HDS, RIJ access site stable, no bleed/hematoma,   Intraprocedurally:   Findings: No cardiomems recalibration needed  PCWP 20  PA 61/23/32  RV 61/8/17  mRAP 16  Jose CO/CI 2.5/1.5  Thermo CO 2.5        Plan:  -Formal cath report pending  -Post procedure management/monitoring per protocol  -Access site precautions  -Bedrest x 1 hours post procedure  -Repeat ECG if any clinical indication or change on tele  -Continue current medical therapy  -DAPT with Brilinta and ASA  -Cont BB with Toprol 50mg po daily   -Cont statin therapy with Lipitor 80mg po qHS   -Educated regarding strict adherence with DAPT   -Educated regarding post procedure management and care  -Discussed the importance of RF modification  -Cardiac rehab info provided/referral and communication to cardiac rehab completed  -F/U outpt in 1-2 weeks with Cardiologist Dr. Allen/Collin  -DISPO: Further plan per Heart failure/primary team

## 2025-04-22 NOTE — CONSULT NOTE ADULT - NS ATTEND AMEND GEN_ALL_CORE FT
Agree with assessment  will be useful to clarify HF preference given extensive history of infection, and chances of mitigating risks with ID consult for potential prophylactic measures and endo input for glucose control optimization.  Decision best be made tomorrow so we could potentially plan for thursday
Ms. Ko is a 73 y/o Telugu speaking female with PMH of ICM/HFrEF (LVEF 35-40%), CAD s/p PCI, Mobitz II s/p PPM (MIRNA Dawn), HTN, HLD, PAD with chronic right LE wound, DM2, and CKD3, who presented to the ED on 3/19 with c/o CP, found to have NSTEMI. She underwent PCI to her RCA for ISR on 3/21/25. Over the next couple of days she developed fever, leukocytosis and worsening renal function. Her blood cultures are positive for ESBL E.coli and CT abdomen is concerning for left pyelonephritis. She was treated with antibiotics and was discharged on oral torsemide and she was instructed to check her cardiomems reading for further diuretic titration. She presented to the HF clinic this am and was found to be very volume overloaded on exam with pro BNP > 13775, weight gain of 20 pounds and cardiomems PAD 32-33. She was sent to the ED for further evaluation.     Cardiomems was interrogated and shows PAD 31-33.     Start bumex 2 mg IV BID for net negative of 2-2.5 L over the next 24 hours. If she is not responding to intermittent bumex, low threshold to start bumex drip.   Continue Toprol 50 mg daily.   Increase hydralazine to 50 mg TID. MAP are currently in the 100's.   We will uptitrate GDMT based on her renal function.   She was not discharged on ARNI, MRA or SGLT2i during her last admission because of acute on chronic kidney disease.  Continue DAPT and statin for recent PCI.   We will repeat an echocardiogram when she is more euvolemic.     Further management of other co-morbidities per primary team.

## 2025-04-22 NOTE — PROGRESS NOTE ADULT - SUBJECTIVE AND OBJECTIVE BOX
Assessment: Presents today for RHC/cardiomems calibration to be perfomed by Dr. Ely  Seen by Heart Failure with no interval change, reviewed labs and ECG and WNL    ASA: 3  Mallampati: 3  Cr: 1.78  GFR: 30  BRA: 5.4%        ROS: as stated above, otherwise negative    PHYSICAL EXAM:  Constitutional: A & O x 3, NAD  HEENT:  Normal oral mucosa, PERRL, EOMI	  Cardiovascular: S1 S2, No murmurs, No JVD  Respiratory: Lungs clear to auscultation	  Gastrointestinal:  Soft, Non-tender, + BS	  Skin: No rashes or cyanosis  Neurologic: No deficit appreciated  Extremities: Normal range of motion, no edema  Vascular: distal pulses +       Plan:  -plan for RHC via RIJ  -patient seen and examined  -confirmed appropriate NPO duration  -ECG and Labs reviewed  -procedure discussed with patient; risks and benefits explained, questions answered  -consent obtained by attending Dr. Ely with granddaughter. Patient refused interpretor service and prefers granddaughter.

## 2025-04-22 NOTE — PROGRESS NOTE ADULT - NS ATTEND AMEND GEN_ALL_CORE FT
Ms. Ko is a 73 y/o Malay speaking female with PMH of ICM/HFrEF (LVEF 35-40%), CAD s/p PCI, Mobitz II s/p PPM (MD NereydaT), HTN, HLD, PAD with chronic right LE wound, DM2, and CKD3, who presented to the ED on 3/19 with c/o CP, found to have NSTEMI. She underwent PCI to her RCA for ISR on 3/21/25. Over the next couple of days she developed fever, leukocytosis and worsening renal function. Her blood cultures are positive for ESBL E.coli and CT abdomen is concerning for left pyelonephritis. She was treated with antibiotics and was discharged on oral torsemide and she was instructed to check her cardiomems reading for further diuretic titration. She presented to the HF clinic on April 9th and was found to be very volume overloaded on exam with pro BNP > 34289, weight gain of 20 pounds and cardiomems PAD 32-33. She was admitted at [facility] for further management.     TTE: 3/21/25 LVEF 25-30%, LVEDD 4.6 cm, normal RV size and function, moderate MR, mild TR, PASP 58  TTE: LVEF 20 -25%, LVEDD 4.3 cm, mild RVE with mild RV dysfunction, moderate MR and moderate TR, PASP 57, AV dyssynchrony noted.       Assessment:  Acute on chronic systolic heart failure  Severe LV systolic function  Mild RV dysfunction  AV dyssynchrony with likely underlying complete heart block  Ischemic cardiomyopathy  CAD s/p recent PCI to RCA ISR in 3/25 with LAD and LCx disease  Chronic kidney disease  Mobitz type II s/p MICRA  Hypertension  Hyperlipidemia  PAD  Diabetes mellitus type 2      Plan:   She received bumex 4 mg VI along with metolazone 5 mg yesterday.   BUM bumped but creatinine stable.  Repeat RHC with cardiomems recalibration today.   Cardiomems PAD is 30 this am.  Continue Toprol 50 mg daily, Increase entresto to full dose.  Continue aldactone 25 mg daily.  Continue imdur 30 mg daily for anginal pain. We will consider repeat LHC if symptoms are worse. EKG is V-paced.  Continue hydralazine 50 mg TID.   Not on SGLT2i given recent pyelonephritis.  Continue DAPT and statin for recent PCI.   EP consult given complete AV dyssynchrony on the monitor and likely etiology for worsening HF.     Further management of other co-morbidities per primary team.

## 2025-04-22 NOTE — CONSULT NOTE ADULT - SUBJECTIVE AND OBJECTIVE BOX
74 year old female patient with ICM, HFrEF (LVEF 25-30%), CAD s/p multiple PCI in past, Mobitz II AV block s/p MDT Micra AV leadless pacemaker insertion (4/2020), HTN, HLD, PAD with chronic right LE wound (hx of cellulitis), DM2, CKD3, and blood cultures positive for E. Coli/ESBL (3/2025 - pylenephritis/urosepsis) who presents yet again with increasing dyspnea on exertion. The patient denied any associated chest pain or palpitations but did acknowledge lower extremity swelling and increased weight of about 16 pounds. She was also reported to have increased CardioMEMS reading of 31 and 25. The patient reported that she was compliant with her medications at home. There were no recent fevers, chills, or sick contacts. Awaiting RHC    Device optimization unable to be performed due to small A4 signal. RV paces: 100%.     Cardiac Summary:   Echo: TTE 3/21/25: LVEF 25-30%, grade II DD, normal RV size/function, severe LAE, moderate MR, mild TR, PASP 58 mmHg ( RAP 8 mmHg), no evidence of LV thrombus, multiple segmental abnormalities/regional WMA.    Cardiac Cath/PCI: 3/21/25 LHC: LM: Luminal irregularities. LAD: Severe diffuse stenosis from mid to distal. LCx: Severe stenosis in OM1 and OM2 (small vessels). RCA: mid ISR 50%, distal ISR 99% stenosis. S/p IVUS (stent underexpanded and severe stenosis) and PCI with 1 ZEINAB. POBA mid RCA, LVEDP 17.  1/13/25 LHC: RCA: Prox 70%, mid 80%, distal 90% stenosis. S/p Rotational atherectomy with RotaPro, IVUS (severe calcium) and PCI with 3 ZEINAB.  12/9/24 RHC/CardioMEMS implant: RA 3, RV 30/2/4, PA 29/14/17, Wedge 11. CO/CI 3.7, 2.19    PAST MEDICAL & SURGICAL HISTORY:  DM (diabetes mellitus)  HTN (hypertension)  H/O gastroesophageal reflux (GERD)  Cardiac pacemaker  MICRA for mobitz type 11  CVA (cerebrovascular accident)  resdiual right sided weakness  CVA (cerebrovascular accident)  PAD (peripheral artery disease)  Wound of right leg  History of benign brain tumor  Cataract  History of biopsy  Cardiac pacemaker  S/P angiogram of extremity    REVIEW OF SYSTEMS  General:	  Skin/Breast:  Ophthalmologic:  ENMT:	  Respiratory and Thorax:	  Cardiovascular:	  Gastrointestinal:	  Genitourinary:	  Musculoskeletal:	  Neurological:	  Psychiatric:	  Hematology/Lymphatics:	  Endocrine:	  Allergic/Immunologic:	    MEDICATIONS  (STANDING):  acetaminophen     Tablet .. 975 milliGRAM(s) Oral every 8 hours  aspirin  chewable 81 milliGRAM(s) Oral daily  atorvastatin 80 milliGRAM(s) Oral at bedtime  buMETAnide IVPB 4 milliGRAM(s) IV Intermittent every 12 hours  dextrose 5%. 1000 milliLiter(s) (100 mL/Hr) IV Continuous <Continuous>  dextrose 5%. 1000 milliLiter(s) (50 mL/Hr) IV Continuous <Continuous>  dextrose 50% Injectable 25 Gram(s) IV Push once  dextrose 50% Injectable 12.5 Gram(s) IV Push once  dextrose 50% Injectable 25 Gram(s) IV Push once  gabapentin 400 milliGRAM(s) Oral daily  glucagon  Injectable 1 milliGRAM(s) IntraMuscular once  hydrALAZINE 25 milliGRAM(s) Oral three times a day  insulin glargine Injectable (LANTUS) 5 Unit(s) SubCutaneous at bedtime  insulin lispro (ADMELOG) corrective regimen sliding scale   SubCutaneous three times a day before meals  isosorbide   mononitrate ER Tablet (IMDUR) 30 milliGRAM(s) Oral daily  lidocaine   4% Patch 1 Patch Transdermal every 24 hours  methocarbamol 500 milliGRAM(s) Oral two times a day  metoprolol succinate ER 50 milliGRAM(s) Oral daily  pantoprazole    Tablet 40 milliGRAM(s) Oral before breakfast  sacubitril 97 mG/valsartan 103 mG 1 Tablet(s) Oral two times a day  sertraline 25 milliGRAM(s) Oral daily  spironolactone 25 milliGRAM(s) Oral daily  ticagrelor 90 milliGRAM(s) Oral every 12 hours    MEDICATIONS  (PRN):  aluminum hydroxide/magnesium hydroxide/simethicone Suspension 30 milliLiter(s) Oral every 4 hours PRN Dyspepsia  dextrose Oral Gel 15 Gram(s) Oral once PRN Blood Glucose LESS THAN 70 milliGRAM(s)/deciliter  loperamide 2 milliGRAM(s) Oral two times a day PRN Diarrhea    Allergies  No Known Allergies    SOCIAL HISTORY:    FAMILY HISTORY:  FH: asthma  Son    Vital Signs Last 24 Hrs  T(C): 36.4 (22 Apr 2025 12:30), Max: 36.7 (21 Apr 2025 15:46)  T(F): 97.5 (22 Apr 2025 12:30), Max: 98.1 (21 Apr 2025 15:46)  HR: 57 (22 Apr 2025 12:30) (57 - 72)  BP: 156/79 (22 Apr 2025 12:30) (150/88 - 167/85)  RR: 17 (22 Apr 2025 12:30) (17 - 18)  SpO2: 100% (22 Apr 2025 12:30) (93% - 100%)    Physical Exam:  Constitutional: AAOx3, NAD  Neck: supple, No JVD  Cardiovascular: +S1S2 RRR, 3/6 IKER at LSB    Pulmonary: CTA b/l, unlabored, no wheezes, rales. rhonci  Abdomen: +BS, soft NTND  Extremities: no edema b/l, +distal pulses b/l  Neuro: non focal, speech clear, OSPINA x 4    LABS:                        10.0   11.05 )-----------( 416      ( 22 Apr 2025 10:00 )             31.0     133[L]  |  95[L]  |  34.9[H]  ----------------------------<  228[H]  3.9   |  22.0  |  1.78[H]  Ca    8.5      22 Apr 2025 07:09  Mg     2.3     04-21  TPro  6.8  /  Alb  3.1[L]  /  TBili  0.7  /  DBili  x   /  AST  22  /  ALT  12  /  AlkPhos  153[H]  04-22  LIVER FUNCTIONS - ( 22 Apr 2025 07:09 )  Alb: 3.1 g/dL / Pro: 6.8 g/dL / ALK PHOS: 153 U/L / ALT: 12 U/L / AST: 22 U/L / GGT: x           RADIOLOGY & ADDITIONAL STUDIES:  TTE 4/21/25  CONCLUSIONS:   1. Technically difficult image quality.   2. Left ventricular cavity is normal in size. Left ventricular wall thickness is normal. Left ventricular systolic function is severely decreased with an ejection fraction visually estimated at 20 to 25 %.   3. Mid anteroseptal segment, entire apex, mid inferoseptal segment, and mid anterolateral segment are abnormal.   4. Left atrium is moderately dilated.   5. There is moderate (grade 2) left ventricular diastolic dysfunction.   6. Mildly enlarged right ventricular cavity size and mildly reduced right ventricular systolic function.   7. Mild to moderate aortic stenosis.   8. The peak transaortic velocity is 1.57 m/s, peak transaortic gradient is 9.9 mmHg and mean transaortic gradient is5.0 mmHg with an LVOT/aortic valve VTI ratio of 0.37. The effective orifice area is estimated at 1.04 cm² by the continuity equation and indexed at 0.62 cm²/m².   9. Mild mitral valve leaflet calcification.  10. Moderate mitral regurgitation.  11. Moderate tricuspid regurgitation.  12. Estimated pulmonary artery systolic pressure is 57 mmHg, moderate pulmonary hypertension.    [facility] 3/21/25  LM: Luminal irregularities    LAD: Severe diffuse stenosis from mid to distal    LCx: Severe stenosis in OM1 and OM2 (small vessels)    RCA: mid ISR 50%, distal ISR 99% stenosis. S/p IVUS (stent  underexpanded and severe stenosis) and PCI with 1 ZEINAB. POBA  mid RCA   LV EDP 17   Recommendations:     Loaded with Brilinta 180mg. C/w Brilinta and ASA 81mg    Acute complication:    No complications       CXR 4/21/25  IMPRESSION: Stable mild CHF.    EKG: RV paced at 60bpm; QRSD 164ms; paced LBBB    A/P  74 year old female patient with ICM, HFrEF (LVEF 25-30%), CAD s/p multiple PCI in past, Mobitz II AV block s/p MDT Micra AV leadless pacemaker insertion (4/2020), HTN, HLD, PAD with chronic right LE wound (hx of cellulitis), DM2, CKD3, and blood cultures positive for E. Coli/ESBL (3/2025 - pylenephritis/urosepsis) who is admitted with acute on chronic HFrEF exacerbation and BRADY. EP consulted regarding upgrade to CRT-P vs conduction system pacing    Patient with hx of cellulitis and E Coli bacteremia  Not very compliant with device follow ups.   High burden of RV pacing with no room for device optimization       History obtained from patient with aide of granddaughter as     74 year old female patient with ICM, HFrEF (LVEF 25-30%), CAD s/p multiple PCI in past, Mobitz II AV block s/p MDT Micra AV leadless pacemaker insertion (4/2020), HTN, HLD, PAD with chronic right LE wound (hx of cellulitis), DM2, CKD3, and blood cultures positive for E. Coli/ESBL (3/2025 - pylenephritis/urosepsis) who presents yet again with increasing dyspnea on exertion. The patient denied any associated chest pain or palpitations but did acknowledge lower extremity swelling and increased weight of about 16 pounds. She was also reported to have increased CardioMEMS reading of 31 and 25. The patient reported that she was compliant with her medications at home. There were no recent fevers, chills, or sick contacts. Awaiting RHC    Per granddaughter patient is fairly independent. RLE wound resolved after treatment in hyperbaric chamber in [place]. No recent fever or chills. Admitted since 4/9/25.     Device optimization unable to be performed due to small A4 signal. RV paces: 100%.     Cardiac Summary:   Echo: TTE 3/21/25: LVEF 25-30%, grade II DD, normal RV size/function, severe LAE, moderate MR, mild TR, PASP 58 mmHg ( RAP 8 mmHg), no evidence of LV thrombus, multiple segmental abnormalities/regional WMA.    Cardiac Cath/PCI: 3/21/25 LHC: LM: Luminal irregularities. LAD: Severe diffuse stenosis from mid to distal. LCx: Severe stenosis in OM1 and OM2 (small vessels). RCA: mid ISR 50%, distal ISR 99% stenosis. S/p IVUS (stent underexpanded and severe stenosis) and PCI with 1 ZEINAB. POBA mid RCA, LVEDP 17.  1/13/25 LHC: RCA: Prox 70%, mid 80%, distal 90% stenosis. S/p Rotational atherectomy with RotaPro, IVUS (severe calcium) and PCI with 3 ZEINAB.  12/9/24 RHC/CardioMEMS implant: RA 3, RV 30/2/4, PA 29/14/17, Wedge 11. CO/CI 3.7, 2.19    PAST MEDICAL & SURGICAL HISTORY:  DM (diabetes mellitus)  HTN (hypertension)  H/O gastroesophageal reflux (GERD)  Cardiac pacemaker  MICRA for mobitz type 11  CVA (cerebrovascular accident)  resdiual right sided weakness  CVA (cerebrovascular accident)  PAD (peripheral artery disease)  Wound of right leg  History of benign brain tumor  Cataract  History of biopsy  Cardiac pacemaker  S/P angiogram of extremity    REVIEW OF SYSTEMS  General: - fever or chills, + fatigue  Skin/Breast: - rashes  Ophthalmologic: - blurred vision  ENMT: - sore throat  Respiratory and Thorax: + cough	  Cardiovascular: + RAMIREZ, + PND, + palpitations, - chest pain  Gastrointestinal:	- N/V/D/C  Genitourinary: - dysuria  Musculoskeletal:	 + arthritis  Neurological: - weaknesses  Psychiatric: + anxiety 	    MEDICATIONS  (STANDING):  acetaminophen     Tablet .. 975 milliGRAM(s) Oral every 8 hours  aspirin  chewable 81 milliGRAM(s) Oral daily  atorvastatin 80 milliGRAM(s) Oral at bedtime  buMETAnide IVPB 4 milliGRAM(s) IV Intermittent every 12 hours  dextrose 5%. 1000 milliLiter(s) (100 mL/Hr) IV Continuous <Continuous>  dextrose 5%. 1000 milliLiter(s) (50 mL/Hr) IV Continuous <Continuous>  dextrose 50% Injectable 25 Gram(s) IV Push once  dextrose 50% Injectable 12.5 Gram(s) IV Push once  dextrose 50% Injectable 25 Gram(s) IV Push once  gabapentin 400 milliGRAM(s) Oral daily  glucagon  Injectable 1 milliGRAM(s) IntraMuscular once  hydrALAZINE 25 milliGRAM(s) Oral three times a day  insulin glargine Injectable (LANTUS) 5 Unit(s) SubCutaneous at bedtime  insulin lispro (ADMELOG) corrective regimen sliding scale   SubCutaneous three times a day before meals  isosorbide   mononitrate ER Tablet (IMDUR) 30 milliGRAM(s) Oral daily  lidocaine   4% Patch 1 Patch Transdermal every 24 hours  methocarbamol 500 milliGRAM(s) Oral two times a day  metoprolol succinate ER 50 milliGRAM(s) Oral daily  pantoprazole    Tablet 40 milliGRAM(s) Oral before breakfast  sacubitril 97 mG/valsartan 103 mG 1 Tablet(s) Oral two times a day  sertraline 25 milliGRAM(s) Oral daily  spironolactone 25 milliGRAM(s) Oral daily  ticagrelor 90 milliGRAM(s) Oral every 12 hours    MEDICATIONS  (PRN):  aluminum hydroxide/magnesium hydroxide/simethicone Suspension 30 milliLiter(s) Oral every 4 hours PRN Dyspepsia  dextrose Oral Gel 15 Gram(s) Oral once PRN Blood Glucose LESS THAN 70 milliGRAM(s)/deciliter  loperamide 2 milliGRAM(s) Oral two times a day PRN Diarrhea    Allergies  No Known Allergies    SOCIAL HISTORY: non smoker non drinker    FAMILY HISTORY:  FH: asthma  Son    Vital Signs Last 24 Hrs  T(C): 36.4 (22 Apr 2025 12:30), Max: 36.7 (21 Apr 2025 15:46)  T(F): 97.5 (22 Apr 2025 12:30), Max: 98.1 (21 Apr 2025 15:46)  HR: 57 (22 Apr 2025 12:30) (57 - 72)  BP: 156/79 (22 Apr 2025 12:30) (150/88 - 167/85)  RR: 17 (22 Apr 2025 12:30) (17 - 18)  SpO2: 100% (22 Apr 2025 12:30) (93% - 100%)    Physical Exam:  Constitutional: Alert, NAD, appears older than stated age  Neck: supple, No JVD  Cardiovascular: +S1S2 RRR, 3/6 IKER at LSB    Pulmonary: Coarse anterior breath sounds  Abdomen: +BS, soft NTND  Extremities: no edema b/l, RLE scar  Neuro: non focal, speech clear, OSPINA x 4  Psych: appropriate mood and affect     LABS:                        10.0   11.05 )-----------( 416      ( 22 Apr 2025 10:00 )             31.0     133[L]  |  95[L]  |  34.9[H]  ----------------------------<  228[H]  3.9   |  22.0  |  1.78[H]  Ca    8.5      22 Apr 2025 07:09  Mg     2.3     04-21  TPro  6.8  /  Alb  3.1[L]  /  TBili  0.7  /  DBili  x   /  AST  22  /  ALT  12  /  AlkPhos  153[H]  04-22  LIVER FUNCTIONS - ( 22 Apr 2025 07:09 )  Alb: 3.1 g/dL / Pro: 6.8 g/dL / ALK PHOS: 153 U/L / ALT: 12 U/L / AST: 22 U/L / GGT: x           RADIOLOGY & ADDITIONAL STUDIES:  TTE 4/21/25  CONCLUSIONS:   1. Technically difficult image quality.   2. Left ventricular cavity is normal in size. Left ventricular wall thickness is normal. Left ventricular systolic function is severely decreased with an ejection fraction visually estimated at 20 to 25 %.   3. Mid anteroseptal segment, entire apex, mid inferoseptal segment, and mid anterolateral segment are abnormal.   4. Left atrium is moderately dilated.   5. There is moderate (grade 2) left ventricular diastolic dysfunction.   6. Mildly enlarged right ventricular cavity size and mildly reduced right ventricular systolic function.   7. Mild to moderate aortic stenosis.   8. The peak transaortic velocity is 1.57 m/s, peak transaortic gradient is 9.9 mmHg and mean transaortic gradient is5.0 mmHg with an LVOT/aortic valve VTI ratio of 0.37. The effective orifice area is estimated at 1.04 cm² by the continuity equation and indexed at 0.62 cm²/m².   9. Mild mitral valve leaflet calcification.  10. Moderate mitral regurgitation.  11. Moderate tricuspid regurgitation.  12. Estimated pulmonary artery systolic pressure is 57 mmHg, moderate pulmonary hypertension.    [facility] 3/21/25  LM: Luminal irregularities    LAD: Severe diffuse stenosis from mid to distal    LCx: Severe stenosis in OM1 and OM2 (small vessels)    RCA: mid ISR 50%, distal ISR 99% stenosis. S/p IVUS (stent  underexpanded and severe stenosis) and PCI with 1 ZEINAB. POBA  mid RCA   LV EDP 17   Recommendations:     Loaded with Brilinta 180mg. C/w Brilinta and ASA 81mg    Acute complication:    No complications       CXR 4/21/25  IMPRESSION: Stable mild CHF.    EKG: RV paced at 60bpm; QRSD 164ms; paced LBBB    A/P  74 year old female patient with ICM, HFrEF (LVEF 25-30%), CAD s/p multiple PCI in past, Mobitz II AV block s/p MDT Micra AV leadless pacemaker insertion (4/2020), HTN, HLD, PAD with chronic right LE wound (hx of cellulitis), DM2, CKD3, and blood cultures positive for E. Coli/ESBL (3/2025 - pylenephritis/urosepsis) who is admitted with acute on chronic HFrEF exacerbation and BRADY. EP consulted regarding upgrade to CRT-P vs conduction system pacing    Patient with hx of cellulitis and E Coli bacteremia  Not very compliant with device follow ups.   High burden of RV pacing with no room for device optimization    - Need to weigh risks and benefits of upgrade to CRT-P vs conduction system pacemaker given prolonged hospitalization, HF optimization, and documented history of bacteremia, frequent UTIs and lower extremity wounds. Patient agreeable to have procedure done as inpatient if recommended by EP.   - Full recommendations to follow below       History obtained from patient with aide of granddaughter as     74 year old female patient with ICM, HFrEF (LVEF 25-30%), CAD s/p multiple PCI in past, Mobitz II AV block s/p MDT Micra AV leadless pacemaker insertion (4/2020), HTN, HLD, PAD with chronic right LE wound (hx of cellulitis), DM2, CKD3, and blood cultures positive for E. Coli/ESBL (3/2025 - pylenephritis/urosepsis) who presents yet again with increasing dyspnea on exertion. The patient denied any associated chest pain or palpitations but did acknowledge lower extremity swelling and increased weight of about 16 pounds. She was also reported to have increased CardioMEMS reading of 31 and 25. The patient reported that she was compliant with her medications at home. There were no recent fevers, chills, or sick contacts. Awaiting RHC    Per granddaughter patient is fairly independent. RLE wound resolved after treatment in hyperbaric chamber in [place]. No recent fever or chills. Admitted since 4/9/25.     Device optimization unable to be performed due to small A4 signal. RV paces: 100%.     Cardiac Summary:   Echo: TTE 3/21/25: LVEF 25-30%, grade II DD, normal RV size/function, severe LAE, moderate MR, mild TR, PASP 58 mmHg ( RAP 8 mmHg), no evidence of LV thrombus, multiple segmental abnormalities/regional WMA.    Cardiac Cath/PCI: 3/21/25 LHC: LM: Luminal irregularities. LAD: Severe diffuse stenosis from mid to distal. LCx: Severe stenosis in OM1 and OM2 (small vessels). RCA: mid ISR 50%, distal ISR 99% stenosis. S/p IVUS (stent underexpanded and severe stenosis) and PCI with 1 ZEINAB. POBA mid RCA, LVEDP 17.  1/13/25 LHC: RCA: Prox 70%, mid 80%, distal 90% stenosis. S/p Rotational atherectomy with RotaPro, IVUS (severe calcium) and PCI with 3 ZEINAB.  12/9/24 RHC/CardioMEMS implant: RA 3, RV 30/2/4, PA 29/14/17, Wedge 11. CO/CI 3.7, 2.19    PAST MEDICAL & SURGICAL HISTORY:  DM (diabetes mellitus)  HTN (hypertension)  H/O gastroesophageal reflux (GERD)  Cardiac pacemaker  MICRA for mobitz type 11  CVA (cerebrovascular accident)  resdiual right sided weakness  CVA (cerebrovascular accident)  PAD (peripheral artery disease)  Wound of right leg  History of benign brain tumor  Cataract  History of biopsy  Cardiac pacemaker  S/P angiogram of extremity    REVIEW OF SYSTEMS  General: - fever or chills, + fatigue  Skin/Breast: - rashes  Ophthalmologic: - blurred vision  ENMT: - sore throat  Respiratory and Thorax: + cough	  Cardiovascular: + RAMIREZ, + PND, + palpitations, - chest pain  Gastrointestinal:	- N/V/D/C  Genitourinary: - dysuria  Musculoskeletal:	 + arthritis  Neurological: - weaknesses  Psychiatric: + anxiety 	    MEDICATIONS  (STANDING):  acetaminophen     Tablet .. 975 milliGRAM(s) Oral every 8 hours  aspirin  chewable 81 milliGRAM(s) Oral daily  atorvastatin 80 milliGRAM(s) Oral at bedtime  buMETAnide IVPB 4 milliGRAM(s) IV Intermittent every 12 hours  dextrose 5%. 1000 milliLiter(s) (100 mL/Hr) IV Continuous <Continuous>  dextrose 5%. 1000 milliLiter(s) (50 mL/Hr) IV Continuous <Continuous>  dextrose 50% Injectable 25 Gram(s) IV Push once  dextrose 50% Injectable 12.5 Gram(s) IV Push once  dextrose 50% Injectable 25 Gram(s) IV Push once  gabapentin 400 milliGRAM(s) Oral daily  glucagon  Injectable 1 milliGRAM(s) IntraMuscular once  hydrALAZINE 25 milliGRAM(s) Oral three times a day  insulin glargine Injectable (LANTUS) 5 Unit(s) SubCutaneous at bedtime  insulin lispro (ADMELOG) corrective regimen sliding scale   SubCutaneous three times a day before meals  isosorbide   mononitrate ER Tablet (IMDUR) 30 milliGRAM(s) Oral daily  lidocaine   4% Patch 1 Patch Transdermal every 24 hours  methocarbamol 500 milliGRAM(s) Oral two times a day  metoprolol succinate ER 50 milliGRAM(s) Oral daily  pantoprazole    Tablet 40 milliGRAM(s) Oral before breakfast  sacubitril 97 mG/valsartan 103 mG 1 Tablet(s) Oral two times a day  sertraline 25 milliGRAM(s) Oral daily  spironolactone 25 milliGRAM(s) Oral daily  ticagrelor 90 milliGRAM(s) Oral every 12 hours    MEDICATIONS  (PRN):  aluminum hydroxide/magnesium hydroxide/simethicone Suspension 30 milliLiter(s) Oral every 4 hours PRN Dyspepsia  dextrose Oral Gel 15 Gram(s) Oral once PRN Blood Glucose LESS THAN 70 milliGRAM(s)/deciliter  loperamide 2 milliGRAM(s) Oral two times a day PRN Diarrhea    Allergies  No Known Allergies    SOCIAL HISTORY: non smoker non drinker    FAMILY HISTORY:  FH: asthma  Son    Vital Signs Last 24 Hrs  T(C): 36.4 (22 Apr 2025 12:30), Max: 36.7 (21 Apr 2025 15:46)  T(F): 97.5 (22 Apr 2025 12:30), Max: 98.1 (21 Apr 2025 15:46)  HR: 57 (22 Apr 2025 12:30) (57 - 72)  BP: 156/79 (22 Apr 2025 12:30) (150/88 - 167/85)  RR: 17 (22 Apr 2025 12:30) (17 - 18)  SpO2: 100% (22 Apr 2025 12:30) (93% - 100%)    Physical Exam:  Constitutional: Alert, NAD, appears older than stated age  Neck: supple, No JVD  Cardiovascular: +S1S2 RRR, 3/6 IKER at LSB    Pulmonary: Coarse anterior breath sounds  Abdomen: +BS, soft NTND  Extremities: no edema b/l, RLE scar  Neuro: non focal, speech clear, OSPINA x 4  Psych: appropriate mood and affect     LABS:                        10.0   11.05 )-----------( 416      ( 22 Apr 2025 10:00 )             31.0     133[L]  |  95[L]  |  34.9[H]  ----------------------------<  228[H]  3.9   |  22.0  |  1.78[H]  Ca    8.5      22 Apr 2025 07:09  Mg     2.3     04-21  TPro  6.8  /  Alb  3.1[L]  /  TBili  0.7  /  DBili  x   /  AST  22  /  ALT  12  /  AlkPhos  153[H]  04-22  LIVER FUNCTIONS - ( 22 Apr 2025 07:09 )  Alb: 3.1 g/dL / Pro: 6.8 g/dL / ALK PHOS: 153 U/L / ALT: 12 U/L / AST: 22 U/L / GGT: x           RADIOLOGY & ADDITIONAL STUDIES:  TTE 4/21/25  CONCLUSIONS:   1. Technically difficult image quality.   2. Left ventricular cavity is normal in size. Left ventricular wall thickness is normal. Left ventricular systolic function is severely decreased with an ejection fraction visually estimated at 20 to 25 %.   3. Mid anteroseptal segment, entire apex, mid inferoseptal segment, and mid anterolateral segment are abnormal.   4. Left atrium is moderately dilated.   5. There is moderate (grade 2) left ventricular diastolic dysfunction.   6. Mildly enlarged right ventricular cavity size and mildly reduced right ventricular systolic function.   7. Mild to moderate aortic stenosis.   8. The peak transaortic velocity is 1.57 m/s, peak transaortic gradient is 9.9 mmHg and mean transaortic gradient is5.0 mmHg with an LVOT/aortic valve VTI ratio of 0.37. The effective orifice area is estimated at 1.04 cm² by the continuity equation and indexed at 0.62 cm²/m².   9. Mild mitral valve leaflet calcification.  10. Moderate mitral regurgitation.  11. Moderate tricuspid regurgitation.  12. Estimated pulmonary artery systolic pressure is 57 mmHg, moderate pulmonary hypertension.    [facility] 3/21/25  LM: Luminal irregularities    LAD: Severe diffuse stenosis from mid to distal    LCx: Severe stenosis in OM1 and OM2 (small vessels)    RCA: mid ISR 50%, distal ISR 99% stenosis. S/p IVUS (stent  underexpanded and severe stenosis) and PCI with 1 ZEINAB. POBA  mid RCA   LV EDP 17   Recommendations:     Loaded with Brilinta 180mg. C/w Brilinta and ASA 81mg    Acute complication:    No complications       CXR 4/21/25  IMPRESSION: Stable mild CHF.    EKG: RV paced at 60bpm; QRSD 164ms; paced LBBB    A/P  74 year old female patient with ICM, HFrEF (LVEF 25-30%), CAD s/p multiple PCI in past, Mobitz II AV block s/p MDT Micra AV leadless pacemaker insertion (4/2020), HTN, HLD, PAD with chronic right LE wound (hx of cellulitis), DM2, CKD3, and blood cultures positive for E. Coli/ESBL (3/2025 - pylenephritis/urosepsis) who is admitted with acute on chronic HFrEF exacerbation and BRADY. EP consulted regarding upgrade to CRT-P vs conduction system pacing    Patient with hx of cellulitis and E Coli bacteremia  Not very compliant with device follow ups.   High burden of RV pacing with no room for device optimization    - Need to weigh risks and benefits of upgrade to CRT-D vs conduction system pacemaker given prolonged hospitalization, HF optimization, and documented history of bacteremia, frequent UTIs and lower extremity wounds. Patient agreeable to have procedure done as inpatient if recommended by EP.   - Full recommendations to follow below

## 2025-04-22 NOTE — CHART NOTE - NSCHARTNOTEFT_GEN_A_CORE
Source: Patient, chart, staff     Current Diet: Diet, Consistent Carbohydrate w/Evening Snack:   DASH/TLC {Sodium & Cholesterol Restricted} (DASH)  1200mL Fluid Restriction ([id number])  Supplement Feeding Modality:  Oral  Glucerna Shake Cans or Servings Per Day:  1       Frequency:  Daily (04-16-25 @ 10:34)    PO intake:  50-75%     Source for PO intake: Patient, chart, staff     Current Weight:   4/11 158 lbs  4/15 153.8 lbs  4/17 140.6 lbs  4/19 156 lbs  4/20 154.1 lbs    Left/right leg 2+ edema     Pertinent Medications: MEDICATIONS  (STANDING):  buMETAnide IVPB 4 milliGRAM(s) IV Intermittent every 12 hours  dextrose 5%. 1000 milliLiter(s) (50 mL/Hr) IV Continuous <Continuous>  dextrose 50% Injectable 25 Gram(s) IV Push once  glucagon  Injectable 1 milliGRAM(s) IntraMuscular once  hydrALAZINE 25 milliGRAM(s) Oral three times a day  insulin glargine Injectable (LANTUS) 5 Unit(s) SubCutaneous at bedtime  insulin lispro (ADMELOG) corrective regimen sliding scale   SubCutaneous three times a day before meals  isosorbide   mononitrate ER Tablet (IMDUR) 30 milliGRAM(s) Oral daily  metoprolol succinate ER 50 milliGRAM(s) Oral daily  pantoprazole    Tablet 40 milliGRAM(s) Oral before breakfast  sacubitril 49 mG/valsartan 51 mG 1 Tablet(s) Oral two times a day  sertraline 25 milliGRAM(s) Oral daily  spironolactone 25 milliGRAM(s) Oral daily  ticagrelor 90 milliGRAM(s) Oral every 12 hours    MEDICATIONS  (PRN):  dextrose Oral Gel 15 Gram(s) Oral once PRN Blood Glucose LESS THAN 70 milliGRAM(s)/deciliter  loperamide 2 milliGRAM(s) Oral two times a day PRN Diarrhea    Pertinent Labs: 04-22 Na133 mmol/L[L] Glu 228 mg/dL[H] K+ 3.9 mmol/L Cr  1.78 mg/dL[H] BUN 34.9 mg/dL[H] Alb 3.1 g/dL[L]     Nutrition focused physical exam conducted previously with signs of malnutrition absent    Estimated Needs:   4764-8404 kcal (30-35 kcal/kg 45.3kg)  54-63g protein (1.2-1.4g/kg 45.3kg)    Hospital Course: Per chart "74yoF with a history of coronary artery disease, heart failure, chronic kidney disease, hypertension, diabetes, and anemia who was recently hospitalized for NSTEMI with PCI and bacteremia who presented with dyspnea on exertion and weight gain. Continues on Bumex drip as per cardiology with plans for additional diuretics with metolazone / Bumex IV if labs support. HF considering repeat Heart cath, following strict I/O/ Mems data."    Current Nutrition Diagnosis: Increased Nutrient Needs (protein) related to increased physiological demand for healing as evidenced by heart failure.    Patient [employer] speaking -  services used (ID#[id number]). Pt tolerating current diet well with reported poor PO intake. Pt ate 100% of a small breakfast this AM. Pt receiving Glucerna daily - pt not drinking as she states it gives her diarrhea. Communicated the same with NP on unit to discontinue supplement. Pt with no c/o N/V/C/D at this time, states her last BM was this AM. Will continue to monitor and follow up as needed. RD remains available.     Recommendations:   1) Continue diet as tolerated.   2) Discontinue Glucerna as pt states it gives her diarrhea.   3) Encourage po intake, monitor diet tolerance, and provide assistance at meals as needed.   4) Rx: MVI daily.   5) Monitor BG levels, correct prn   6) Obtain daily weights to monitor trends.     Monitoring and Evaluation:   [x] PO intake [x] Tolerance to diet prescription [X] Weights  [X] Follow up per protocol [X] Labs: chem 8, POCT glucose

## 2025-04-22 NOTE — PROGRESS NOTE ADULT - ASSESSMENT
Gen Cards: Dr. Allen  HF: Dr. Polanco  IC: Dr. Bajwa    73 y/o female with PMH of ICM/HFrEF (LVEF 35-40%), CAD s/p PCI, Mobitz II s/p PPM (Nereyda, MDT), HTN, HLD, PAD with chronic right LE wound, DM2, and CKD3, who was BIBA from CHF clinic due to hypervolemia, weight gain of ~20 lbs in the past 9 days despite escalation of PO diuretics.      She was recently admitted on 3/19/25 with c/o CP and found to have NSTEMI. She underwent LHC on 3/21 which showed mid-distal LAD severe stenosis, OM1 and OM2 severe stenosis, mid ISR 50%, distal ISR 99%, s/p PCI with 1 ZEINAB to RCA.  On 3/22 she had a fever with leukocytosis and her blood cultures were positive for E. Coli/ESBL. Her CT A/P showed left pyelonephritis. She also had an BRADY for which her Entresto was held. She was discharged home 3/31 and her CardioMEMS was not at goal and had been rising. She was advised to increase Torsemide 40 mg daily to twice daily on 4/4. Despite this, she has continued to gain weight and reports poor UOP response.     Admission labwork: Na 125, K 4.6, BUN 37.7, Cr 1.94, eGFR 27, Mg 2.3, proBNP 99436!  CXR: increased interstitial markings bilaterally, cardiomegaly.   On 4/10 she developed a fever, tmax 100.4F. RVP negative, empirically started on IV Zosyn.  4/13 s/p right knee fluid aspiration (s/p recent trauma/fall).  4/15 initiated on continuous Bumex infusion for inadequate response to intermittent dosing.  4/21: Received 1 dose of metolazone 5 mg and Bumex gtt switched to 4 mg IV BID.    Prior Cardiac Testing:  TTE 4/21/25: LVEF 20-25%, LVIDd 4.3 cm, LVOT VTI 12.6 cm, grade 2 DD, mild RVE with mild RV dysfunction, mild-mod AS, mod MR/TR, mild OR, est PASP 57 mmHg, est RAP 8 mmHg.    TTE 3/21/25: LVEF 25-30%, grade II DD, normal RV size/function, severe LAE, moderate MR, mild TR, PASP 58 mmHg ( RAP 8 mmHg), no evidence of LV thrombus, multiple segmental abnormalities/regional WMA.    CardioMEMS PAD (goal 14):  4/22: 29-30  4/20: 31  4/18: 27 4/17: 27 (in chair ~30 degrees).  4/14: 30-33  4/11: 32  4/9: 32  3/27: 26  3/24: 23  3/21: 22

## 2025-04-22 NOTE — PROGRESS NOTE ADULT - SUBJECTIVE AND OBJECTIVE BOX
[facility] Division of Hospital Medicine    Chief Complaint:      SUBJECTIVE / OVERNIGHT EVENTS:    Patient seen and examined at bedside, no acute events overnight, patient denies any new complaints. Continues to have reproducible chest pain, however no episodes of pain noted overnight. Plan for repeat Heart Cath as per HF team today, will reprogram MEMs/ recheck R. heart pressures.     MEDICATIONS  (STANDING):  acetaminophen     Tablet .. 975 milliGRAM(s) Oral every 8 hours  aspirin  chewable 81 milliGRAM(s) Oral daily  atorvastatin 80 milliGRAM(s) Oral at bedtime  buMETAnide IVPB 4 milliGRAM(s) IV Intermittent every 12 hours  dextrose 5%. 1000 milliLiter(s) (100 mL/Hr) IV Continuous <Continuous>  dextrose 5%. 1000 milliLiter(s) (50 mL/Hr) IV Continuous <Continuous>  dextrose 50% Injectable 25 Gram(s) IV Push once  dextrose 50% Injectable 12.5 Gram(s) IV Push once  dextrose 50% Injectable 25 Gram(s) IV Push once  gabapentin 400 milliGRAM(s) Oral daily  glucagon  Injectable 1 milliGRAM(s) IntraMuscular once  hydrALAZINE 25 milliGRAM(s) Oral three times a day  insulin glargine Injectable (LANTUS) 5 Unit(s) SubCutaneous at bedtime  insulin lispro (ADMELOG) corrective regimen sliding scale   SubCutaneous three times a day before meals  isosorbide   mononitrate ER Tablet (IMDUR) 30 milliGRAM(s) Oral daily  lidocaine   4% Patch 1 Patch Transdermal every 24 hours  methocarbamol 500 milliGRAM(s) Oral two times a day  metoprolol succinate ER 50 milliGRAM(s) Oral daily  pantoprazole    Tablet 40 milliGRAM(s) Oral before breakfast  sacubitril 97 mG/valsartan 103 mG 1 Tablet(s) Oral two times a day  sertraline 25 milliGRAM(s) Oral daily  spironolactone 25 milliGRAM(s) Oral daily  ticagrelor 90 milliGRAM(s) Oral every 12 hours    MEDICATIONS  (PRN):  aluminum hydroxide/magnesium hydroxide/simethicone Suspension 30 milliLiter(s) Oral every 4 hours PRN Dyspepsia  dextrose Oral Gel 15 Gram(s) Oral once PRN Blood Glucose LESS THAN 70 milliGRAM(s)/deciliter  loperamide 2 milliGRAM(s) Oral two times a day PRN Diarrhea        I&O's Summary    21 Apr 2025 07:01  -  22 Apr 2025 07:00  --------------------------------------------------------  IN: 1010 mL / OUT: 1150 mL / NET: -140 mL    22 Apr 2025 07:01  -  22 Apr 2025 14:02  --------------------------------------------------------  IN: 240 mL / OUT: 660 mL / NET: -420 mL        PHYSICAL EXAM:  Vital Signs Last 24 Hrs  T(C): 36.4 (22 Apr 2025 12:30), Max: 36.7 (21 Apr 2025 15:46)  T(F): 97.5 (22 Apr 2025 12:30), Max: 98.1 (21 Apr 2025 15:46)  HR: 57 (22 Apr 2025 12:30) (57 - 72)  BP: 156/79 (22 Apr 2025 12:30) (150/88 - 167/85)  BP(mean): --  RR: 17 (22 Apr 2025 12:30) (17 - 18)  SpO2: 100% (22 Apr 2025 12:30) (93% - 100%)    Parameters below as of 22 Apr 2025 12:30  Patient On (Oxygen Delivery Method): room air      CONSTITUTIONAL: NAD, on NC  RESPIRATORY: Normal respiratory effort; lungs are clear to auscultation bilaterally  CARDIOVASCULAR: Regular rate and rhythm   ABDOMEN: Nontender to palpation, normoactive bowel sounds  MSK: ROM wnl in extremities x4, R. knee improved     LABS:                        10.0   11.05 )-----------( 416      ( 22 Apr 2025 10:00 )             31.0     04-22    133[L]  |  95[L]  |  34.9[H]  ----------------------------<  228[H]  3.9   |  22.0  |  1.78[H]    Ca    8.5      22 Apr 2025 07:09  Mg     2.3     04-21    TPro  6.8  /  Alb  3.1[L]  /  TBili  0.7  /  DBili  x   /  AST  22  /  ALT  12  /  AlkPhos  153[H]  04-22          Urinalysis Basic - ( 22 Apr 2025 07:09 )    Color: x / Appearance: x / SG: x / pH: x  Gluc: 228 mg/dL / Ketone: x  / Bili: x / Urobili: x   Blood: x / Protein: x / Nitrite: x   Leuk Esterase: x / RBC: x / WBC x   Sq Epi: x / Non Sq Epi: x / Bacteria: x        CAPILLARY BLOOD GLUCOSE      POCT Blood Glucose.: 253 mg/dL (22 Apr 2025 12:05)  POCT Blood Glucose.: 225 mg/dL (22 Apr 2025 08:17)  POCT Blood Glucose.: 217 mg/dL (21 Apr 2025 21:00)  POCT Blood Glucose.: 146 mg/dL (21 Apr 2025 17:00)        RADIOLOGY & ADDITIONAL TESTS:  Results Reviewed:   Imaging Personally Reviewed:  Electrocardiogram Personally Reviewed:

## 2025-04-22 NOTE — PROGRESS NOTE ADULT - SUBJECTIVE AND OBJECTIVE BOX
[facility] - PROGRESS NOTE  [address] [place] [address]  Office Phone: [phone]/Fax: [phone]  Service/On Call Phone [phone]  _______________________________________________________________________________________________________    Subjective:  - NAEO  - Patient states her CP has improved and denies any SOB at rest, orthopnea, PND, or palpitations.    Medications:  acetaminophen     Tablet .. 975 milliGRAM(s) Oral every 8 hours  aluminum hydroxide/magnesium hydroxide/simethicone Suspension 30 milliLiter(s) Oral every 4 hours PRN  aspirin  chewable 81 milliGRAM(s) Oral daily  atorvastatin 80 milliGRAM(s) Oral at bedtime  buMETAnide IVPB 4 milliGRAM(s) IV Intermittent every 12 hours  dextrose 5%. 1000 milliLiter(s) IV Continuous <Continuous>  dextrose 5%. 1000 milliLiter(s) IV Continuous <Continuous>  dextrose 50% Injectable 25 Gram(s) IV Push once  dextrose 50% Injectable 12.5 Gram(s) IV Push once  dextrose 50% Injectable 25 Gram(s) IV Push once  dextrose Oral Gel 15 Gram(s) Oral once PRN  gabapentin 400 milliGRAM(s) Oral daily  glucagon  Injectable 1 milliGRAM(s) IntraMuscular once  hydrALAZINE 25 milliGRAM(s) Oral three times a day  insulin glargine Injectable (LANTUS) 5 Unit(s) SubCutaneous at bedtime  insulin lispro (ADMELOG) corrective regimen sliding scale   SubCutaneous three times a day before meals  isosorbide   mononitrate ER Tablet (IMDUR) 30 milliGRAM(s) Oral daily  lidocaine   4% Patch 1 Patch Transdermal every 24 hours  loperamide 2 milliGRAM(s) Oral two times a day PRN  methocarbamol 500 milliGRAM(s) Oral two times a day  metoprolol succinate ER 50 milliGRAM(s) Oral daily  pantoprazole    Tablet 40 milliGRAM(s) Oral before breakfast  sacubitril 49 mG/valsartan 51 mG 1 Tablet(s) Oral two times a day  sertraline 25 milliGRAM(s) Oral daily  spironolactone 25 milliGRAM(s) Oral daily  ticagrelor 90 milliGRAM(s) Oral every 12 hours      ICU Vital Signs Last 24 Hrs  T(C): 36.3, Max: 36.7 ( @ 15:46)  HR: 72 (62 - 72)  BP: 150/88 (150/88 - 167/85)  BP(mean): --  ABP: --  ABP(mean): --  RR: 17 (17 - 18)  SpO2: 95% (93% - 100%)    Weight in k.8 (25)  Weight in k.9 (25)      I&O's Summary Last 24 Hrs    IN: 1010 mL / OUT: 1150 mL / NET: -140 mL      Tele:     Physical Exam:    General: No distress. Comfortable.  Neck: JVP elevated.  Respiratory: Crackles bilaterally  CV: Normal S1 and S2. No murmurs, rub, or gallops. Radial pulses normal.  Abdomen: Soft, non-distended, non-tender  Extremities: Warm, trace LE edema bilaterally.  Neurology: Non-focal, alert and oriented times three.   Psych: Affect normal    Labs:    ( 25 @ 10:00 )               10.0   11.05 )--------( 416                  31.0     ( 25 @ 07:09 )     133  |  95  |  34.9  ---------------------<  228  3.9  |  22.0  |  1.78    Ca 8.5  Phos x   Mg x     ( 25 @ 07:09 )  TPro  6.8  /  Alb  3.1  /  TBili  0.7  /  DBili  x   /  AST  22  /  ALT  12  /  AlkPhos  153  ( 25 @ 12:00 )  TPro  7.6  /  Alb  3.5  /  TBili  0.6  /  DBili  x   /  AST  16  /  ALT  10  /  AlkPhos  139    ( 25 @ 16:15 )  TropHS 118   / CK x     / CKMB x      ( 25 @ 10:04 )  TropHS 140   / CK x     / CKMB x

## 2025-04-22 NOTE — PROGRESS NOTE ADULT - PROBLEM SELECTOR PLAN 2
- Most recent PCI 3/21/25  - Continue BB as above and Imdur. Patient was also on Ranexa at home.  - Continue daily ASA and Brilinta  - Continue high intensity statin therapy - Atorvastatin 80mg nightly.

## 2025-04-22 NOTE — PROGRESS NOTE ADULT - ASSESSMENT
74yoF with a history of coronary artery disease, heart failure, chronic kidney disease, hypertension, diabetes, and anemia who was recently hospitalized for NSTEMI with PCI and bacteremia who presented with dyspnea on exertion and weight gain. Continues on Bumex drip as per cardiology additional diuretics with metolazone / Bumex IV given 4/21. HF planning for repeat Heart cath 4/22, following strict I/O/ Mems data.     #Acute on chronic systolic heart failure  - Recent echocardiogram noted ejection fraction of 31% with multiple segmental abnormalities  - c/w IV bumex per HF  plan 4/21 for Metolazone, Bumex IV push, if given will hold Bumex drip and monitor output   - Strict in and out, goal net negative 2-2.5 L daily   - Daily weights  - On metoprolol, Imdur, spironolactone, Entresto increased to full dose, HDZN,   - Heart Failure on board recs appreciated, as above, increasing diuretics will monitor output   - pt with reproducible chest wall pain, started on pain regimen, remains intermittent with improvement with GI cocktail / pain medications, continue to monitor   per HF plan for repeat Heart cath today 4/22     #Right knee pain  - knee xray showing effusion, possible in setting of fall  - pain regimen adjusted  - CT knee result noted  - ortho consulted, sp aspiration fluid neg for crystals/infections  ROM improving     #Fever  noted febrile episode 4/19 overnight to 100.9, repeat RVP, s/p Abxs with Zosyn, continue to monitor off Abx at this time   - CXR noted with ?Atelectasis vs edema vs infx  - sp Zosyn x 5 days   - swallow eval result noted, diet advanced   - Bcx NGTD    #Diarrhea  - possibly abx related  - GI pcr neg, PRN imodium started    #Coronary artery disease  - Continue on aspirin, ticagrelor, and atorvastatin  - Recent cardiac catheterization with PCI, Brillinta dosage increased to 90 BID from 60 as per HF  - initial trop 140 -> 118  - pt with recurrent chest pain, high up in chest, trop downtrended   - c/w maalox, pain improves with GI cocktail, as above consideration for repeat heart cath this admission     #Chronic kidney disease  - Renal function improved from prior values  - Continue to monitor while on diuresis    #Hyponatremia  improved   - Hypervolemic on presentation  - Bumetanide for diuresis, may adjust as above addition of metolazone / bumex IV push, may hold bumex drip and monitor ouptut     #Diabetes  - Insulin coverage  - Close monitoring of blood glucose levels  - Gabapentin for neuropathy  - need Glucometer on discharge     DVT ppx: ASA/Brilinta   Dispo: Pending HF optimization and weaning off Bumex drip   repeat heart cath today   PT eval - [facility], but family prefers home

## 2025-04-23 LAB
A1C WITH ESTIMATED AVERAGE GLUCOSE RESULT: 7.1 % — HIGH (ref 4–5.6)
ANION GAP SERPL CALC-SCNC: 17 MMOL/L — SIGNIFICANT CHANGE UP (ref 5–17)
BASOPHILS # BLD AUTO: 0.07 K/UL — SIGNIFICANT CHANGE UP (ref 0–0.2)
BASOPHILS NFR BLD AUTO: 0.7 % — SIGNIFICANT CHANGE UP (ref 0–2)
BUN SERPL-MCNC: 41.4 MG/DL — HIGH (ref 8–20)
CALCIUM SERPL-MCNC: 8.9 MG/DL — SIGNIFICANT CHANGE UP (ref 8.4–10.5)
CHLORIDE SERPL-SCNC: 92 MMOL/L — LOW (ref 96–108)
CO2 SERPL-SCNC: 23 MMOL/L — SIGNIFICANT CHANGE UP (ref 22–29)
CREAT SERPL-MCNC: 1.88 MG/DL — HIGH (ref 0.5–1.3)
EGFR: 28 ML/MIN/1.73M2 — LOW
EGFR: 28 ML/MIN/1.73M2 — LOW
EOSINOPHIL # BLD AUTO: 0.22 K/UL — SIGNIFICANT CHANGE UP (ref 0–0.5)
EOSINOPHIL NFR BLD AUTO: 2.1 % — SIGNIFICANT CHANGE UP (ref 0–6)
ESTIMATED AVERAGE GLUCOSE: 157 MG/DL — HIGH (ref 68–114)
GLUCOSE BLDC GLUCOMTR-MCNC: 168 MG/DL — HIGH (ref 70–99)
GLUCOSE BLDC GLUCOMTR-MCNC: 176 MG/DL — HIGH (ref 70–99)
GLUCOSE BLDC GLUCOMTR-MCNC: 176 MG/DL — HIGH (ref 70–99)
GLUCOSE BLDC GLUCOMTR-MCNC: 178 MG/DL — HIGH (ref 70–99)
GLUCOSE BLDC GLUCOMTR-MCNC: 250 MG/DL — HIGH (ref 70–99)
GLUCOSE SERPL-MCNC: 177 MG/DL — HIGH (ref 70–99)
HCT VFR BLD CALC: 26.8 % — LOW (ref 34.5–45)
HGB BLD-MCNC: 9 G/DL — LOW (ref 11.5–15.5)
IMM GRANULOCYTES # BLD AUTO: 0.18 K/UL — HIGH (ref 0–0.07)
IMM GRANULOCYTES NFR BLD AUTO: 1.7 % — HIGH (ref 0–0.9)
LYMPHOCYTES # BLD AUTO: 1.26 K/UL — SIGNIFICANT CHANGE UP (ref 1–3.3)
LYMPHOCYTES NFR BLD AUTO: 12.2 % — LOW (ref 13–44)
MCHC RBC-ENTMCNC: 28.7 PG — SIGNIFICANT CHANGE UP (ref 27–34)
MCHC RBC-ENTMCNC: 33.6 G/DL — SIGNIFICANT CHANGE UP (ref 32–36)
MCV RBC AUTO: 85.4 FL — SIGNIFICANT CHANGE UP (ref 80–100)
MONOCYTES # BLD AUTO: 0.78 K/UL — SIGNIFICANT CHANGE UP (ref 0–0.9)
MONOCYTES NFR BLD AUTO: 7.5 % — SIGNIFICANT CHANGE UP (ref 2–14)
NEUTROPHILS # BLD AUTO: 7.86 K/UL — HIGH (ref 1.8–7.4)
NEUTROPHILS NFR BLD AUTO: 75.8 % — SIGNIFICANT CHANGE UP (ref 43–77)
NRBC # BLD AUTO: 0.02 K/UL — HIGH (ref 0–0)
NRBC # FLD: 0.02 K/UL — HIGH (ref 0–0)
NRBC BLD AUTO-RTO: 0 /100 WBCS — SIGNIFICANT CHANGE UP (ref 0–0)
PLATELET # BLD AUTO: 390 K/UL — SIGNIFICANT CHANGE UP (ref 150–400)
PMV BLD: 9.5 FL — SIGNIFICANT CHANGE UP (ref 7–13)
POTASSIUM SERPL-MCNC: 3.5 MMOL/L — SIGNIFICANT CHANGE UP (ref 3.5–5.3)
POTASSIUM SERPL-SCNC: 3.5 MMOL/L — SIGNIFICANT CHANGE UP (ref 3.5–5.3)
RBC # BLD: 3.14 M/UL — LOW (ref 3.8–5.2)
RBC # FLD: 17.8 % — HIGH (ref 10.3–14.5)
SODIUM SERPL-SCNC: 132 MMOL/L — LOW (ref 135–145)
WBC # BLD: 10.37 K/UL — SIGNIFICANT CHANGE UP (ref 3.8–10.5)
WBC # FLD AUTO: 10.37 K/UL — SIGNIFICANT CHANGE UP (ref 3.8–10.5)

## 2025-04-23 PROCEDURE — 99232 SBSQ HOSP IP/OBS MODERATE 35: CPT

## 2025-04-23 PROCEDURE — 99233 SBSQ HOSP IP/OBS HIGH 50: CPT

## 2025-04-23 RX ORDER — CARVEDILOL 3.12 MG/1
6.25 TABLET, FILM COATED ORAL EVERY 12 HOURS
Refills: 0 | Status: DISCONTINUED | OUTPATIENT
Start: 2025-04-23 | End: 2025-05-03

## 2025-04-23 RX ORDER — MELATONIN 5 MG
3 TABLET ORAL AT BEDTIME
Refills: 0 | Status: DISCONTINUED | OUTPATIENT
Start: 2025-04-23 | End: 2025-05-09

## 2025-04-23 RX ORDER — ISOSORBIDE MONONITRATE 60 MG/1
60 TABLET, EXTENDED RELEASE ORAL DAILY
Refills: 0 | Status: DISCONTINUED | OUTPATIENT
Start: 2025-04-24 | End: 2025-05-04

## 2025-04-23 RX ORDER — ISOSORBIDE MONONITRATE 60 MG/1
30 TABLET, EXTENDED RELEASE ORAL ONCE
Refills: 0 | Status: COMPLETED | OUTPATIENT
Start: 2025-04-23 | End: 2025-04-23

## 2025-04-23 RX ADMIN — Medication 3 MILLIGRAM(S): at 22:04

## 2025-04-23 RX ADMIN — GABAPENTIN 400 MILLIGRAM(S): 400 CAPSULE ORAL at 10:27

## 2025-04-23 RX ADMIN — TICAGRELOR 90 MILLIGRAM(S): 90 TABLET ORAL at 17:31

## 2025-04-23 RX ADMIN — SERTRALINE 25 MILLIGRAM(S): 100 TABLET, FILM COATED ORAL at 13:36

## 2025-04-23 RX ADMIN — TICAGRELOR 90 MILLIGRAM(S): 90 TABLET ORAL at 05:06

## 2025-04-23 RX ADMIN — Medication 975 MILLIGRAM(S): at 06:04

## 2025-04-23 RX ADMIN — Medication 40 MILLIEQUIVALENT(S): at 10:28

## 2025-04-23 RX ADMIN — CARVEDILOL 6.25 MILLIGRAM(S): 3.12 TABLET, FILM COATED ORAL at 17:33

## 2025-04-23 RX ADMIN — LIDOCAINE HYDROCHLORIDE 1 PATCH: 20 JELLY TOPICAL at 10:27

## 2025-04-23 RX ADMIN — INSULIN LISPRO 2: 100 INJECTION, SOLUTION INTRAVENOUS; SUBCUTANEOUS at 08:22

## 2025-04-23 RX ADMIN — Medication 40 MILLIGRAM(S): at 05:07

## 2025-04-23 RX ADMIN — METHOCARBAMOL 500 MILLIGRAM(S): 500 TABLET, FILM COATED ORAL at 05:07

## 2025-04-23 RX ADMIN — Medication 50 MILLIGRAM(S): at 13:40

## 2025-04-23 RX ADMIN — Medication 25 MILLIGRAM(S): at 05:06

## 2025-04-23 RX ADMIN — METHOCARBAMOL 500 MILLIGRAM(S): 500 TABLET, FILM COATED ORAL at 17:33

## 2025-04-23 RX ADMIN — INSULIN GLARGINE-YFGN 5 UNIT(S): 100 INJECTION, SOLUTION SUBCUTANEOUS at 22:05

## 2025-04-23 RX ADMIN — INSULIN LISPRO 4: 100 INJECTION, SOLUTION INTRAVENOUS; SUBCUTANEOUS at 12:17

## 2025-04-23 RX ADMIN — LIDOCAINE HYDROCHLORIDE 1 PATCH: 20 JELLY TOPICAL at 00:50

## 2025-04-23 RX ADMIN — SACUBITRIL AND VALSARTAN 1 TABLET(S): 6; 6 PELLET ORAL at 17:31

## 2025-04-23 RX ADMIN — Medication 40 MILLIEQUIVALENT(S): at 17:33

## 2025-04-23 RX ADMIN — METOPROLOL SUCCINATE 50 MILLIGRAM(S): 50 TABLET, EXTENDED RELEASE ORAL at 05:06

## 2025-04-23 RX ADMIN — Medication 50 MILLIGRAM(S): at 22:05

## 2025-04-23 RX ADMIN — Medication 975 MILLIGRAM(S): at 22:04

## 2025-04-23 RX ADMIN — Medication 81 MILLIGRAM(S): at 10:26

## 2025-04-23 RX ADMIN — SACUBITRIL AND VALSARTAN 1 TABLET(S): 6; 6 PELLET ORAL at 05:07

## 2025-04-23 RX ADMIN — Medication 975 MILLIGRAM(S): at 13:39

## 2025-04-23 RX ADMIN — ATORVASTATIN CALCIUM 80 MILLIGRAM(S): 80 TABLET, FILM COATED ORAL at 22:05

## 2025-04-23 RX ADMIN — INSULIN LISPRO 2: 100 INJECTION, SOLUTION INTRAVENOUS; SUBCUTANEOUS at 17:29

## 2025-04-23 RX ADMIN — ISOSORBIDE MONONITRATE 30 MILLIGRAM(S): 60 TABLET, EXTENDED RELEASE ORAL at 10:26

## 2025-04-23 RX ADMIN — Medication 975 MILLIGRAM(S): at 05:04

## 2025-04-23 NOTE — PROGRESS NOTE ADULT - PROBLEM SELECTOR PLAN 1
- ICM, most recent TTE on 4/21/25 showed further decline of LVEF to 20-25%  - Clinically appears mildly volume overloaded on exam w/warm extremities. Hypertensive.  - Serum proBNP >68K on admission. Repeat pro-BNP > 70k on 4/21.  - Hyponatremia improved with diuresis.  - CardioMEMS PAD rising despite aggressive diuresis with Bumex gtt (goal closer to 14).   - Diuretics: Will hold Bumex 4 mg IV BID (last dose 4/22 PM).  - Maintain K>4, Mg >2.  - GDMT: Switch Toprol XL to Coreg 6.25 mg BID for better BP control. Will also increase hydralazine to 50 mg TID and Imdur to 60 mg daily. Continue Entresto  mg BID and spironolactone 25 mg daily.  No Farxiga 2/2 h/o pyelonephritis.   - Device: s/p Micra PPM with chronic . EP consulted for consideration of device upgrade to CRT (attempted to optimize RA and RV synchrony but ultimately unsuccessful, see device interrogation note). Plan is for cardiac MRI today to decide is patient needs ICD and device upgrade tomorrow.  - Low Na, 1.5L FR diet recommended  - Please document strict I&Os and daily standing weight.

## 2025-04-23 NOTE — PROGRESS NOTE ADULT - SUBJECTIVE AND OBJECTIVE BOX
[facility] Division of Hospital Medicine    Chief Complaint:      SUBJECTIVE / OVERNIGHT EVENTS:    Patient seen and examined at bedside, no acute events overnight, patient denies any new complaints. Per HF and EP team plan today is for Cardiac MR to determine if patient will require CRT device upgrade. per EP given patients recent infections request ID input prior to device implant, will consult for clearance for potential procedure. Although no current suspicion of infection.     MEDICATIONS  (STANDING):  acetaminophen     Tablet .. 975 milliGRAM(s) Oral every 8 hours  aspirin  chewable 81 milliGRAM(s) Oral daily  atorvastatin 80 milliGRAM(s) Oral at bedtime  carvedilol 6.25 milliGRAM(s) Oral every 12 hours  dextrose 5%. 1000 milliLiter(s) (50 mL/Hr) IV Continuous <Continuous>  dextrose 5%. 1000 milliLiter(s) (100 mL/Hr) IV Continuous <Continuous>  dextrose 50% Injectable 25 Gram(s) IV Push once  dextrose 50% Injectable 12.5 Gram(s) IV Push once  dextrose 50% Injectable 25 Gram(s) IV Push once  gabapentin 400 milliGRAM(s) Oral daily  glucagon  Injectable 1 milliGRAM(s) IntraMuscular once  hydrALAZINE 50 milliGRAM(s) Oral three times a day  insulin glargine Injectable (LANTUS) 5 Unit(s) SubCutaneous at bedtime  insulin lispro (ADMELOG) corrective regimen sliding scale   SubCutaneous three times a day before meals  lidocaine   4% Patch 1 Patch Transdermal every 24 hours  melatonin 3 milliGRAM(s) Oral at bedtime  methocarbamol 500 milliGRAM(s) Oral two times a day  pantoprazole    Tablet 40 milliGRAM(s) Oral before breakfast  sacubitril 97 mG/valsartan 103 mG 1 Tablet(s) Oral two times a day  sertraline 25 milliGRAM(s) Oral daily  spironolactone 25 milliGRAM(s) Oral daily  ticagrelor 90 milliGRAM(s) Oral every 12 hours    MEDICATIONS  (PRN):  aluminum hydroxide/magnesium hydroxide/simethicone Suspension 30 milliLiter(s) Oral every 4 hours PRN Dyspepsia  dextrose Oral Gel 15 Gram(s) Oral once PRN Blood Glucose LESS THAN 70 milliGRAM(s)/deciliter  loperamide 2 milliGRAM(s) Oral two times a day PRN Diarrhea        I&O's Summary    22 Apr 2025 07:01  -  23 Apr 2025 07:00  --------------------------------------------------------  IN: 960 mL / OUT: 2020 mL / NET: -1060 mL    23 Apr 2025 07:01  -  23 Apr 2025 14:31  --------------------------------------------------------  IN: 450 mL / OUT: 900 mL / NET: -450 mL        PHYSICAL EXAM:  Vital Signs Last 24 Hrs  T(C): 36.7 (23 Apr 2025 08:02), Max: 36.8 (22 Apr 2025 15:25)  T(F): 98 (23 Apr 2025 08:02), Max: 98.3 (22 Apr 2025 15:25)  HR: 67 (23 Apr 2025 13:37) (52 - 69)  BP: 156/78 (23 Apr 2025 13:37) (121/57 - 161/77)  BP(mean): --  RR: 17 (23 Apr 2025 08:02) (12 - 18)  SpO2: 98% (23 Apr 2025 08:02) (94% - 100%)    Parameters below as of 23 Apr 2025 08:02  Patient On (Oxygen Delivery Method): nasal cannula      CONSTITUTIONAL: NAD, on NC  RESPIRATORY: Normal respiratory effort; lungs are clear to auscultation bilaterally  CARDIOVASCULAR: Regular rate and rhythm   ABDOMEN: Nontender to palpation, normoactive bowel sounds  MSK: ROM wnl in extremities x4, R. knee improved    LABS:                        9.0    10.37 )-----------( 390      ( 23 Apr 2025 05:40 )             26.8     04-23    132[L]  |  92[L]  |  41.4[H]  ----------------------------<  177[H]  3.5   |  23.0  |  1.88[H]    Ca    8.9      23 Apr 2025 05:40    TPro  6.8  /  Alb  3.1[L]  /  TBili  0.7  /  DBili  x   /  AST  22  /  ALT  12  /  AlkPhos  153[H]  04-22          Urinalysis Basic - ( 23 Apr 2025 05:40 )    Color: x / Appearance: x / SG: x / pH: x  Gluc: 177 mg/dL / Ketone: x  / Bili: x / Urobili: x   Blood: x / Protein: x / Nitrite: x   Leuk Esterase: x / RBC: x / WBC x   Sq Epi: x / Non Sq Epi: x / Bacteria: x        CAPILLARY BLOOD GLUCOSE      POCT Blood Glucose.: 250 mg/dL (23 Apr 2025 12:12)  POCT Blood Glucose.: 168 mg/dL (23 Apr 2025 08:09)  POCT Blood Glucose.: 178 mg/dL (23 Apr 2025 05:05)  POCT Blood Glucose.: 223 mg/dL (22 Apr 2025 22:01)  POCT Blood Glucose.: 136 mg/dL (22 Apr 2025 16:18)        RADIOLOGY & ADDITIONAL TESTS:  Results Reviewed:   Imaging Personally Reviewed:  Electrocardiogram Personally Reviewed:

## 2025-04-23 NOTE — CHART NOTE - NSCHARTNOTEFT_GEN_A_CORE
Cardiac MRI rescheduled for 9AM on 4/24/25 with [employer] reprogramming of Micra device.   Upgrade scheduled for later in afternoon.

## 2025-04-23 NOTE — PROGRESS NOTE ADULT - SUBJECTIVE AND OBJECTIVE BOX
ADVANCED HEART FAILURE - PROGRESS NOTE  [address] [place] [address]  Office Phone: [phone]/Fax: [phone]  Service/On Call Phone [phone]  _______________________________________________________________________________________________________    Subjective:  - NAEO  - Patient is OOB to chair this morning with no complaints    Medications:  acetaminophen     Tablet .. 975 milliGRAM(s) Oral every 8 hours  aluminum hydroxide/magnesium hydroxide/simethicone Suspension 30 milliLiter(s) Oral every 4 hours PRN  aspirin  chewable 81 milliGRAM(s) Oral daily  atorvastatin 80 milliGRAM(s) Oral at bedtime  carvedilol 6.25 milliGRAM(s) Oral every 12 hours  dextrose 5%. 1000 milliLiter(s) IV Continuous <Continuous>  dextrose 5%. 1000 milliLiter(s) IV Continuous <Continuous>  dextrose 50% Injectable 25 Gram(s) IV Push once  dextrose 50% Injectable 12.5 Gram(s) IV Push once  dextrose 50% Injectable 25 Gram(s) IV Push once  dextrose Oral Gel 15 Gram(s) Oral once PRN  gabapentin 400 milliGRAM(s) Oral daily  glucagon  Injectable 1 milliGRAM(s) IntraMuscular once  hydrALAZINE 50 milliGRAM(s) Oral three times a day  insulin glargine Injectable (LANTUS) 5 Unit(s) SubCutaneous at bedtime  insulin lispro (ADMELOG) corrective regimen sliding scale   SubCutaneous three times a day before meals  isosorbide   mononitrate ER Tablet (IMDUR) 30 milliGRAM(s) Oral once  lidocaine   4% Patch 1 Patch Transdermal every 24 hours  loperamide 2 milliGRAM(s) Oral two times a day PRN  melatonin 3 milliGRAM(s) Oral at bedtime  methocarbamol 500 milliGRAM(s) Oral two times a day  pantoprazole    Tablet 40 milliGRAM(s) Oral before breakfast  sacubitril 97 mG/valsartan 103 mG 1 Tablet(s) Oral two times a day  sertraline 25 milliGRAM(s) Oral daily  spironolactone 25 milliGRAM(s) Oral daily  ticagrelor 90 milliGRAM(s) Oral every 12 hours      ICU Vital Signs Last 24 Hrs  T(C): 36.7, Max: 36.8 ( @ 15:25)  HR: 61 (52 - 69)  BP: 146/74 (121/57 - 167/80)  BP(mean): --  ABP: --  ABP(mean): --  RR: 17 (12 - 20)  SpO2: 98% (94% - 100%)    Weight in k.2 (25)  Weight in k.8 (25)      I&O's Summary Last 24 Hrs    IN: 960 mL / OUT: 2020 mL / NET: -1060 mL      Tele:  50-60s    Physical Exam:    General: No distress. Comfortable.  Neck: JVP elevated.  Respiratory: CTA bilaterally  CV: Normal S1 and S2. No murmurs, rub, or gallops. Radial pulses normal.  Abdomen: Soft, non-distended, non-tender  Extremities: Warm, trace LE edema bilaterally.  Neurology: Non-focal, alert and oriented times three.   Psych: Affect normal    Labs:    ( 25 @ 05:40 )               9.0    10.37 )--------( 390                  26.8     ( 25 @ 05:40 )     132  |  92  |  41.4  ---------------------<  177  3.5  |  23.0  |  1.88    Ca 8.9  Phos x   Mg x     ( 25 @ 07:09 )  TPro  6.8  /  Alb  3.1  /  TBili  0.7  /  DBili  x   /  AST  22  /  ALT  12  /  AlkPhos  153  ( 25 @ 12:00 )  TPro  7.6  /  Alb  3.5  /  TBili  0.6  /  DBili  x   /  AST  16  /  ALT  10  /  AlkPhos  139    ( 25 @ 16:15 )  TropHS 118   / CK x     / CKMB x      ( 25 @ 10:04 )  TropHS 140   / CK x     / CKMB x

## 2025-04-23 NOTE — PROGRESS NOTE ADULT - ASSESSMENT
Gen Cards: Dr. Allen  HF: Dr. Polanco  IC: Dr. Bajwa    75 y/o female with PMH of ICM/HFrEF (LVEF 35-40%), CAD s/p PCI, Mobitz II s/p PPM (Nereyda, MDT), HTN, HLD, PAD with chronic right LE wound, DM2, and CKD3, who was BIBA from CHF clinic due to hypervolemia, weight gain of ~20 lbs in the past 9 days despite escalation of PO diuretics.      She was recently admitted on 3/19/25 with c/o CP and found to have NSTEMI. She underwent LHC on 3/21 which showed mid-distal LAD severe stenosis, OM1 and OM2 severe stenosis, mid ISR 50%, distal ISR 99%, s/p PCI with 1 ZEINAB to RCA.  On 3/22 she had a fever with leukocytosis and her blood cultures were positive for E. Coli/ESBL. Her CT A/P showed left pyelonephritis. She also had an BRADY for which her Entresto was held. She was discharged home 3/31 and her CardioMEMS was not at goal and had been rising. She was advised to increase Torsemide 40 mg daily to twice daily on 4/4. Despite this, she has continued to gain weight and reports poor UOP response.     Admission labwork: Na 125, K 4.6, BUN 37.7, Cr 1.94, eGFR 27, Mg 2.3, proBNP 07653!  CXR: increased interstitial markings bilaterally, cardiomegaly.   On 4/10 she developed a fever, tmax 100.4F. RVP negative, empirically started on IV Zosyn.  4/13 s/p right knee fluid aspiration (s/p recent trauma/fall).  4/15 initiated on continuous Bumex infusion for inadequate response to intermittent dosing.  4/21: Received 1 dose of metolazone 5 mg and Bumex gtt switched to 4 mg IV BID.    Prior Cardiac Testing:  RHC 4/22/25: RA 18, RV 63/18, PA 63/25/37, PCW 22, PA sat 37%, Jose CO/CI 2.5/1.5, Thermo CO 2.5, SVR 2800 dsc, PVR 4.5 WILLARD. No CardioMEMS recalibration needed.     TTE 4/21/25: LVEF 20-25%, LVIDd 4.3 cm, LVOT VTI 12.6 cm, grade 2 DD, mild RVE with mild RV dysfunction, mild-mod AS, mod MR/TR, mild WI, est PASP 57 mmHg, est RAP 8 mmHg.    TTE 3/21/25: LVEF 25-30%, grade II DD, normal RV size/function, severe LAE, moderate MR, mild TR, PASP 58 mmHg ( RAP 8 mmHg), no evidence of LV thrombus, multiple segmental abnormalities/regional WMA.    CardioMEMS PAD (goal 14):  4/22: 29-30  4/20: 31  4/18: 27  4/17: 27 (in chair ~30 degrees).  4/14: 30-33  4/11: 32  4/9: 32  3/27: 26  3/24: 23  3/21: 22

## 2025-04-23 NOTE — PROGRESS NOTE ADULT - NS ATTEND AMEND GEN_ALL_CORE FT
I agree with the assessment and plan as above.  Will plan for a CRT or CSP, +/- defibrillator tomorrow, depending on CMR results.  Will appreciate ID input on whether prophylactic antibiotic may be required long-term for monthly UTIs in the presence of freshly implanted CIED

## 2025-04-23 NOTE — PROGRESS NOTE ADULT - SUBJECTIVE AND OBJECTIVE BOX
Patient seen today in chair OOB with son at bedside. Reports improvement in dyspnea    TELE: RV paced.     MEDICATIONS  (STANDING):  acetaminophen     Tablet .. 975 milliGRAM(s) Oral every 8 hours  aspirin  chewable 81 milliGRAM(s) Oral daily  atorvastatin 80 milliGRAM(s) Oral at bedtime  dextrose 5%. 1000 milliLiter(s) (50 mL/Hr) IV Continuous <Continuous>  dextrose 5%. 1000 milliLiter(s) (100 mL/Hr) IV Continuous <Continuous>  dextrose 50% Injectable 25 Gram(s) IV Push once  dextrose 50% Injectable 12.5 Gram(s) IV Push once  dextrose 50% Injectable 25 Gram(s) IV Push once  gabapentin 400 milliGRAM(s) Oral daily  glucagon  Injectable 1 milliGRAM(s) IntraMuscular once  hydrALAZINE 25 milliGRAM(s) Oral three times a day  insulin glargine Injectable (LANTUS) 5 Unit(s) SubCutaneous at bedtime  insulin lispro (ADMELOG) corrective regimen sliding scale   SubCutaneous three times a day before meals  isosorbide   mononitrate ER Tablet (IMDUR) 30 milliGRAM(s) Oral daily  lidocaine   4% Patch 1 Patch Transdermal every 24 hours  melatonin 3 milliGRAM(s) Oral at bedtime  methocarbamol 500 milliGRAM(s) Oral two times a day  metoprolol succinate ER 50 milliGRAM(s) Oral daily  pantoprazole    Tablet 40 milliGRAM(s) Oral before breakfast  potassium chloride    Tablet ER 40 milliEquivalent(s) Oral once  sacubitril 97 mG/valsartan 103 mG 1 Tablet(s) Oral two times a day  sertraline 25 milliGRAM(s) Oral daily  spironolactone 25 milliGRAM(s) Oral daily  ticagrelor 90 milliGRAM(s) Oral every 12 hours    MEDICATIONS  (PRN):  aluminum hydroxide/magnesium hydroxide/simethicone Suspension 30 milliLiter(s) Oral every 4 hours PRN Dyspepsia  dextrose Oral Gel 15 Gram(s) Oral once PRN Blood Glucose LESS THAN 70 milliGRAM(s)/deciliter  loperamide 2 milliGRAM(s) Oral two times a day PRN Diarrhea    Allergies  No Known Allergies    PAST MEDICAL & SURGICAL HISTORY:  DM (diabetes mellitus)  HTN (hypertension)  H/O gastroesophageal reflux (GERD)  Cardiac pacemaker  MICRA for mobitz type 11  CVA (cerebrovascular accident)  resdiual right sided weakness  CVA (cerebrovascular accident)  PAD (peripheral artery disease)  Wound of right leg  History of benign brain tumor  Cataract  History of biopsy  Cardiac pacemaker  S/P angiogram of extremity    Vital Signs Last 24 Hrs  T(C): 36.7 (23 Apr 2025 08:02), Max: 36.8 (22 Apr 2025 15:25)  T(F): 98 (23 Apr 2025 08:02), Max: 98.3 (22 Apr 2025 15:25)  HR: 61 (23 Apr 2025 10:20) (52 - 69)  BP: 146/74 (23 Apr 2025 10:20) (121/57 - 167/80)  RR: 17 (23 Apr 2025 08:02) (12 - 20)  SpO2: 98% (23 Apr 2025 08:02) (94% - 100%    Physical Exam:  Constitutional: Alert, NAD, appears older than stated age  Neck: supple, No JVD  Cardiovascular: +S1S2 RRR, 3/6 IKER at LSB    Pulmonary: Coarse anterior breath sounds  Abdomen: +BS, soft NTND  Extremities: no edema b/l, RLE scar  Neuro: non focal, speech clear, OSPINA x 4    LABS:                        9.0    10.37 )-----------( 390      ( 23 Apr 2025 05:40 )             26.8     132[L]  |  92[L]  |  41.4[H]  ----------------------------<  177[H]  3.5   |  23.0  |  1.88[H]  Ca    8.9      23 Apr 2025 05:40  Mg     2.3     04-21  TPro  6.8  /  Alb  3.1[L]  /  TBili  0.7  /  DBili  x   /  AST  22  /  ALT  12  /  AlkPhos  153[H]  04-22    RHC 4/22/25  1. No CardioMEMS calibration required; mPAP and PAd values reported by  CardioMEMS and PA catheter differed by < 5  mmHg.    2. Elevated biventricular filling pressures: mRAP 18 mmHg, mPCWP 22  mmHg.  3. Reduced CO/CI of 2.5 L/min  and 1.5 L/min/m2 by assumed Jose  calculation and thermodilution.  4. SVR 2800 dynes; PVR 4.5 WILLARD.    5. Ghxi-tf-ebro variation in pressure waveforms are secondary to AV  dissociation.  Recommendations:   Patient may draw significant symptomatic benefit from cardiac  resynchronization therapy (CRT); appreciate ongoing deliberation  and clinical optimization toward that goal.      A/P  74 year old female patient with ICM, HFrEF (LVEF 25-30%), CAD s/p multiple PCI in past, Mobitz II AV block s/p MDT Micra AV leadless pacemaker insertion (4/2020), HTN, HLD, PAD with chronic right LE wound (hx of cellulitis), DM2, CKD3, and blood cultures positive for E. Coli/ESBL (3/2025 - pylenephritis/urosepsis) who is admitted with acute on chronic HFrEF exacerbation and BRADY. EP consulted regarding upgrade to CRT-P vs conduction system pacing    RHC performed yesterday    - Plan for CMRI today at 14:00  - GDMT per HF team  - NPO post midnight tonight for upgrade to CRT-P vs CRT-D pending CMRI scar burden  - Patient and family aware of elevated risk given frequent infection, hx of bacteremia, and prolonged hospitalization. They prefer to have procedure done while admitted.

## 2025-04-23 NOTE — PROGRESS NOTE ADULT - ASSESSMENT
75 yo female (known to our service) + CKD(III) + ICM (EF 25-30%) admitted with decompensated CHF  Despite ongoing diuresis based on CardioMEMS data she has failed to reach her goal    CKD stage III renal function seems to be near baseline  - pt s/p Bumex infusion for now and Entresto as per cardiology  - can consider RRT to augment UF if medical management proves inadequate  - Low potassium will supplement    Monitor labs will follow

## 2025-04-23 NOTE — PROGRESS NOTE ADULT - NS ATTEND AMEND GEN_ALL_CORE FT
Ms. Ko is a 73 y/o [place]ian speaking female with PMH of ICM/HFrEF (LVEF 35-40%), CAD s/p PCI, Mobitz II s/p PPM (MIRNA Dawn), HTN, HLD, PAD with chronic right LE wound, DM2, and CKD3, who presented to the ED on 3/19 with c/o CP, found to have NSTEMI. She underwent PCI to her RCA for ISR on 3/21/25. Over the next couple of days she developed fever, leukocytosis and worsening renal function. Her blood cultures are positive for ESBL E.coli and CT abdomen is concerning for left pyelonephritis. She was treated with antibiotics and was discharged on oral torsemide and she was instructed to check her cardiomems reading for further diuretic titration. She presented to the HF clinic on April 9th and was found to be very volume overloaded on exam with pro BNP > 79749, weight gain of 20 pounds and cardiomems PAD 32-33. She was admitted at [facility] for further management.     TTE: 3/21/25 LVEF 25-30%, LVEDD 4.6 cm, normal RV size and function, moderate MR, mild TR, PASP 58  TTE: LVEF 20 -25%, LVEDD 4.3 cm, mild RVE with mild RV dysfunction, moderate MR and moderate TR, PASP 57, AV dyssynchrony noted.   RHC: 4/22/25: RA 18, RV 63/18, PA 63/25/37, PCW 22, PA sat 37%, Jose CO/CI 2.5/1.5, Thermo CO 2.5, SVR 2800, PVR 4.5 WILLARD. No CardioMEMS recalibration needed.    Assessment:  Acute on chronic systolic heart failure  Severe LV systolic function  Mild RV dysfunction  AV dyssynchrony with likely underlying complete heart block  Ischemic cardiomyopathy  CAD s/p recent PCI to RCA ISR in 3/25 with LAD and LCx disease  Chronic kidney disease  Mobitz type II s/p MICRA  Hypertension  Hyperlipidemia  PAD  Diabetes mellitus type 2      Plan:   Received one dose of Bumex 4 mg this am. BUN/Cr trending  up.   Stop IV diuretics for now. We will continue to reassess daily.   RHC showed mildly elevated filling pressures and low cardiac output in the setting of high SVR.   We will continue to optimize BP given high BP.   Switch Toprol to Coreg 6.25 mg BID for better BP control. Will also increase hydralazine to 50 mg TID and Imdur to 60 mg daily.   Continue Entresto  mg BID and spironolactone 25 mg daily.    Not on SGLT2i given recent pyelonephritis.  Continue DAPT and statin for recent PCI.   Appreciate EP recommendations. Patient will benefit from CRT-D.     Further management of other co-morbidities per primary team.

## 2025-04-23 NOTE — PROGRESS NOTE ADULT - SUBJECTIVE AND OBJECTIVE BOX
NEPHROLOGY INTERVAL HPI/OVERNIGHT EVENTS:    Examined earlier  No distress   Events noted    MEDICATIONS  (STANDING):  acetaminophen     Tablet .. 975 milliGRAM(s) Oral every 8 hours  aspirin  chewable 81 milliGRAM(s) Oral daily  atorvastatin 80 milliGRAM(s) Oral at bedtime  carvedilol 6.25 milliGRAM(s) Oral every 12 hours  dextrose 5%. 1000 milliLiter(s) (50 mL/Hr) IV Continuous <Continuous>  dextrose 5%. 1000 milliLiter(s) (100 mL/Hr) IV Continuous <Continuous>  dextrose 50% Injectable 25 Gram(s) IV Push once  dextrose 50% Injectable 12.5 Gram(s) IV Push once  dextrose 50% Injectable 25 Gram(s) IV Push once  gabapentin 400 milliGRAM(s) Oral daily  glucagon  Injectable 1 milliGRAM(s) IntraMuscular once  hydrALAZINE 50 milliGRAM(s) Oral three times a day  insulin glargine Injectable (LANTUS) 5 Unit(s) SubCutaneous at bedtime  insulin lispro (ADMELOG) corrective regimen sliding scale   SubCutaneous three times a day before meals  lidocaine   4% Patch 1 Patch Transdermal every 24 hours  melatonin 3 milliGRAM(s) Oral at bedtime  methocarbamol 500 milliGRAM(s) Oral two times a day  pantoprazole    Tablet 40 milliGRAM(s) Oral before breakfast  sacubitril 97 mG/valsartan 103 mG 1 Tablet(s) Oral two times a day  sertraline 25 milliGRAM(s) Oral daily  spironolactone 25 milliGRAM(s) Oral daily  ticagrelor 90 milliGRAM(s) Oral every 12 hours    MEDICATIONS  (PRN):  aluminum hydroxide/magnesium hydroxide/simethicone Suspension 30 milliLiter(s) Oral every 4 hours PRN Dyspepsia  dextrose Oral Gel 15 Gram(s) Oral once PRN Blood Glucose LESS THAN 70 milliGRAM(s)/deciliter  loperamide 2 milliGRAM(s) Oral two times a day PRN Diarrhea      Allergies    No Known Allergies    Intolerances        Vital Signs Last 24 Hrs  T(C): 36.6 (2025 16:22), Max: 36.8 (2025 21:51)  T(F): 97.8 (2025 16:22), Max: 98.2 (2025 21:51)  HR: 60 (2025 16:22) (52 - 69)  BP: 144/72 (2025 16:22) (121/57 - 161/77)  BP(mean): --  RR: 18 (2025 16:22) (17 - 18)  SpO2: 100% (2025 16:22) (94% - 100%)    Parameters below as of 2025 16:22  Patient On (Oxygen Delivery Method): room air      Daily     Daily Weight in k.2 (2025 09:02)    PHYSICAL EXAM:  GENERAL: NAD weak  HEAD: NCAT  EYES: EOMI  NECK: Supple, No JVD  NERVOUS SYSTEM:  Alert & Oriented X3  CHEST/LUNG: Diminished BS noted  HEART: Regular rate and rhythm; No murmur  ABDOMEN: Soft, Nontender, +BS  EXTREMITIES: + dependent edema    LABS:                        9.0    10.37 )-----------( 390      ( 2025 05:40 )             26.8     04-23    132[L]  |  92[L]  |  41.4[H]  ----------------------------<  177[H]  3.5   |  23.0  |  1.88[H]    Ca    8.9      2025 05:40    TPro  6.8  /  Alb  3.1[L]  /  TBili  0.7  /  DBili  x   /  AST  22  /  ALT  12  /  AlkPhos  153[H]  04-22      Urinalysis Basic - ( 2025 05:40 )    Color: x / Appearance: x / SG: x / pH: x  Gluc: 177 mg/dL / Ketone: x  / Bili: x / Urobili: x   Blood: x / Protein: x / Nitrite: x   Leuk Esterase: x / RBC: x / WBC x   Sq Epi: x / Non Sq Epi: x / Bacteria: x              RADIOLOGY & ADDITIONAL TESTS:

## 2025-04-23 NOTE — PROGRESS NOTE ADULT - ASSESSMENT
74yoF with a history of coronary artery disease, heart failure, chronic kidney disease, hypertension, diabetes, and anemia who was recently hospitalized for NSTEMI with PCI and bacteremia who presented with dyspnea on exertion and weight gain. Continues on Bumex drip as per cardiology additional diuretics with metolazone / Bumex IV given 4/21. s/p repeat heart cath 4/22 with no need for MEMS recalibration however plan for upgarde to CRT-P vs CRT-D device pending Cardiac MR ordered 4/23.       #Acute on chronic systolic heart failure  - Recent echocardiogram noted ejection fraction of 31% with multiple segmental abnormalities  - Diuresis as per HF guidance, holding Bumex at this time   s/p Metolazone 4/21, with Bumex IV push  last diuretic dose 4/22 PM with IV bumex 4 mg   - Strict in and out  - Daily weights  - On metoprolol, Imdur increased to 60 mg daily, spironolactone, Entresto increased to full dose, HDZN increased to 50 mg TID,   - Heart Failure on board recs appreciated,  planning with EP for CRT device upgrade 4/24 if able, Cardiac MR ordered to assess scar burden   will keep NPO after midnight   - Given patients recurrent UTI's / history of bacteremia, EP requesting ID input for potential abx's prior to device upgrade / implant, consulted    -s/p repeat Heart cath 4/22, no recalibration of Mems needed   - pt with reproducible chest wall pain, started on pain regimen, remains intermittent with improvement with GI cocktail / pain medications, continue to monitor     #Right knee pain  - knee xray showing effusion, possible in setting of fall  - pain regimen adjusted  - CT knee result noted  - ortho consulted, sp aspiration fluid neg for crystals/infections  ROM improving     #Fever  resolved   noted febrile episode 4/19 overnight to 100.9, repeat RVP, s/p Abxs with Zosyn, continue to monitor off Abx at this time   - CXR noted with ?Atelectasis vs edema vs infx  - sp Zosyn x 5 days   - swallow eval result noted, diet advanced   - Bcx NGTD    #Diarrhea  - possibly abx related  - GI pcr neg, PRN imodium started    #Coronary artery disease  - Continue on aspirin, ticagrelor, and atorvastatin  - Recent cardiac catheterization with PCI, Brillinta dosage increased to 90 BID from 60 as per HF  - initial trop 140 -> 118  - pt with recurrent chest pain, high up in chest, trop downtrended   - c/w maalox, pain improves with GI cocktail, as above consideration for repeat heart cath this admission     #Chronic kidney disease  - Renal function improved from prior values  - Continue to monitor while on diuresis    #Hyponatremia  improved   - Hypervolemic on presentation  - Bumetanide for diuresis, may adjust as above addition of metolazone / bumex IV push, may hold bumex drip and monitor ouptut     #Diabetes  - Insulin coverage  - Close monitoring of blood glucose levels  - Gabapentin for neuropathy  - need Glucometer on discharge     DVT ppx: ASA/Brilinta   Dispo: Pending HF optimization possible EP procedure 4/24   PT veronica REEVES, but family prefers home

## 2025-04-24 LAB
ANION GAP SERPL CALC-SCNC: 18 MMOL/L — HIGH (ref 5–17)
APTT BLD: 31.9 SEC — SIGNIFICANT CHANGE UP (ref 26.1–36.8)
BASOPHILS # BLD AUTO: 0.08 K/UL — SIGNIFICANT CHANGE UP (ref 0–0.2)
BASOPHILS NFR BLD AUTO: 0.8 % — SIGNIFICANT CHANGE UP (ref 0–2)
BLD GP AB SCN SERPL QL: SIGNIFICANT CHANGE UP
BUN SERPL-MCNC: 42.3 MG/DL — HIGH (ref 8–20)
CALCIUM SERPL-MCNC: 8.3 MG/DL — LOW (ref 8.4–10.5)
CHLORIDE SERPL-SCNC: 93 MMOL/L — LOW (ref 96–108)
CO2 SERPL-SCNC: 19 MMOL/L — LOW (ref 22–29)
CREAT SERPL-MCNC: 1.96 MG/DL — HIGH (ref 0.5–1.3)
EGFR: 27 ML/MIN/1.73M2 — LOW
EGFR: 27 ML/MIN/1.73M2 — LOW
EOSINOPHIL # BLD AUTO: 0.46 K/UL — SIGNIFICANT CHANGE UP (ref 0–0.5)
EOSINOPHIL NFR BLD AUTO: 4.6 % — SIGNIFICANT CHANGE UP (ref 0–6)
GLUCOSE BLDC GLUCOMTR-MCNC: 216 MG/DL — HIGH (ref 70–99)
GLUCOSE BLDC GLUCOMTR-MCNC: 242 MG/DL — HIGH (ref 70–99)
GLUCOSE BLDC GLUCOMTR-MCNC: 266 MG/DL — HIGH (ref 70–99)
GLUCOSE SERPL-MCNC: 196 MG/DL — HIGH (ref 70–99)
HCT VFR BLD CALC: 27.7 % — LOW (ref 34.5–45)
HGB BLD-MCNC: 8.9 G/DL — LOW (ref 11.5–15.5)
IMM GRANULOCYTES # BLD AUTO: 0.12 K/UL — HIGH (ref 0–0.07)
IMM GRANULOCYTES NFR BLD AUTO: 1.2 % — HIGH (ref 0–0.9)
INR BLD: 1.34 RATIO — HIGH (ref 0.85–1.16)
LYMPHOCYTES # BLD AUTO: 1.17 K/UL — SIGNIFICANT CHANGE UP (ref 1–3.3)
LYMPHOCYTES NFR BLD AUTO: 11.7 % — LOW (ref 13–44)
MCHC RBC-ENTMCNC: 27.9 PG — SIGNIFICANT CHANGE UP (ref 27–34)
MCHC RBC-ENTMCNC: 32.1 G/DL — SIGNIFICANT CHANGE UP (ref 32–36)
MCV RBC AUTO: 86.8 FL — SIGNIFICANT CHANGE UP (ref 80–100)
MONOCYTES # BLD AUTO: 0.79 K/UL — SIGNIFICANT CHANGE UP (ref 0–0.9)
MONOCYTES NFR BLD AUTO: 7.9 % — SIGNIFICANT CHANGE UP (ref 2–14)
NEUTROPHILS # BLD AUTO: 7.38 K/UL — SIGNIFICANT CHANGE UP (ref 1.8–7.4)
NEUTROPHILS NFR BLD AUTO: 73.8 % — SIGNIFICANT CHANGE UP (ref 43–77)
NRBC # BLD AUTO: 0 K/UL — SIGNIFICANT CHANGE UP (ref 0–0)
NRBC # FLD: 0 K/UL — SIGNIFICANT CHANGE UP (ref 0–0)
NRBC BLD AUTO-RTO: 0 /100 WBCS — SIGNIFICANT CHANGE UP (ref 0–0)
PLATELET # BLD AUTO: 393 K/UL — SIGNIFICANT CHANGE UP (ref 150–400)
PMV BLD: 9.6 FL — SIGNIFICANT CHANGE UP (ref 7–13)
POTASSIUM SERPL-MCNC: 4.8 MMOL/L — SIGNIFICANT CHANGE UP (ref 3.5–5.3)
POTASSIUM SERPL-SCNC: 4.8 MMOL/L — SIGNIFICANT CHANGE UP (ref 3.5–5.3)
PROTHROM AB SERPL-ACNC: 15.1 SEC — HIGH (ref 9.9–13.4)
RBC # BLD: 3.19 M/UL — LOW (ref 3.8–5.2)
RBC # FLD: 18.1 % — HIGH (ref 10.3–14.5)
SODIUM SERPL-SCNC: 130 MMOL/L — LOW (ref 135–145)
WBC # BLD: 10 K/UL — SIGNIFICANT CHANGE UP (ref 3.8–10.5)
WBC # FLD AUTO: 10 K/UL — SIGNIFICANT CHANGE UP (ref 3.8–10.5)

## 2025-04-24 PROCEDURE — 71045 X-RAY EXAM CHEST 1 VIEW: CPT | Mod: 26

## 2025-04-24 PROCEDURE — 93010 ELECTROCARDIOGRAM REPORT: CPT

## 2025-04-24 PROCEDURE — G0545: CPT

## 2025-04-24 PROCEDURE — 71045 X-RAY EXAM CHEST 1 VIEW: CPT | Mod: 26,77

## 2025-04-24 PROCEDURE — 99233 SBSQ HOSP IP/OBS HIGH 50: CPT

## 2025-04-24 PROCEDURE — 75561 CARDIAC MRI FOR MORPH W/DYE: CPT | Mod: 26

## 2025-04-24 PROCEDURE — 99231 SBSQ HOSP IP/OBS SF/LOW 25: CPT | Mod: 25

## 2025-04-24 PROCEDURE — 33208 INSRT HEART PM ATRIAL & VENT: CPT | Mod: KX

## 2025-04-24 PROCEDURE — 99152 MOD SED SAME PHYS/QHP 5/>YRS: CPT

## 2025-04-24 PROCEDURE — 99222 1ST HOSP IP/OBS MODERATE 55: CPT

## 2025-04-24 RX ORDER — OXYCODONE HYDROCHLORIDE 30 MG/1
5 TABLET ORAL EVERY 8 HOURS
Refills: 0 | Status: DISCONTINUED | OUTPATIENT
Start: 2025-04-24 | End: 2025-04-25

## 2025-04-24 RX ORDER — CEPHALEXIN 250 MG/1
250 CAPSULE ORAL EVERY 12 HOURS
Refills: 0 | Status: COMPLETED | OUTPATIENT
Start: 2025-04-25 | End: 2025-04-29

## 2025-04-24 RX ORDER — OXYCODONE HYDROCHLORIDE 30 MG/1
5 TABLET ORAL ONCE
Refills: 0 | Status: DISCONTINUED | OUTPATIENT
Start: 2025-04-24 | End: 2025-04-24

## 2025-04-24 RX ORDER — ONDANSETRON HCL/PF 4 MG/2 ML
4 VIAL (ML) INJECTION EVERY 6 HOURS
Refills: 0 | Status: DISCONTINUED | OUTPATIENT
Start: 2025-04-24 | End: 2025-05-09

## 2025-04-24 RX ORDER — CEFAZOLIN SODIUM IN 0.9 % NACL 3 G/100 ML
1000 INTRAVENOUS SOLUTION, PIGGYBACK (ML) INTRAVENOUS EVERY 12 HOURS
Refills: 0 | Status: COMPLETED | OUTPATIENT
Start: 2025-04-24 | End: 2025-04-25

## 2025-04-24 RX ADMIN — INSULIN GLARGINE-YFGN 5 UNIT(S): 100 INJECTION, SOLUTION SUBCUTANEOUS at 21:35

## 2025-04-24 RX ADMIN — OXYCODONE HYDROCHLORIDE 5 MILLIGRAM(S): 30 TABLET ORAL at 17:39

## 2025-04-24 RX ADMIN — GABAPENTIN 400 MILLIGRAM(S): 400 CAPSULE ORAL at 18:22

## 2025-04-24 RX ADMIN — CARVEDILOL 6.25 MILLIGRAM(S): 3.12 TABLET, FILM COATED ORAL at 18:22

## 2025-04-24 RX ADMIN — CARVEDILOL 6.25 MILLIGRAM(S): 3.12 TABLET, FILM COATED ORAL at 05:37

## 2025-04-24 RX ADMIN — Medication 25 MILLIGRAM(S): at 05:35

## 2025-04-24 RX ADMIN — METHOCARBAMOL 500 MILLIGRAM(S): 500 TABLET, FILM COATED ORAL at 18:22

## 2025-04-24 RX ADMIN — TICAGRELOR 90 MILLIGRAM(S): 90 TABLET ORAL at 18:21

## 2025-04-24 RX ADMIN — TICAGRELOR 90 MILLIGRAM(S): 90 TABLET ORAL at 05:34

## 2025-04-24 RX ADMIN — OXYCODONE HYDROCHLORIDE 5 MILLIGRAM(S): 30 TABLET ORAL at 17:50

## 2025-04-24 RX ADMIN — OXYCODONE HYDROCHLORIDE 5 MILLIGRAM(S): 30 TABLET ORAL at 01:25

## 2025-04-24 RX ADMIN — Medication 81 MILLIGRAM(S): at 18:21

## 2025-04-24 RX ADMIN — INSULIN LISPRO 4: 100 INJECTION, SOLUTION INTRAVENOUS; SUBCUTANEOUS at 18:23

## 2025-04-24 RX ADMIN — SACUBITRIL AND VALSARTAN 1 TABLET(S): 6; 6 PELLET ORAL at 05:33

## 2025-04-24 RX ADMIN — Medication 50 MILLIGRAM(S): at 05:34

## 2025-04-24 RX ADMIN — Medication 975 MILLIGRAM(S): at 21:34

## 2025-04-24 RX ADMIN — Medication 50 MILLIGRAM(S): at 21:34

## 2025-04-24 RX ADMIN — SERTRALINE 25 MILLIGRAM(S): 100 TABLET, FILM COATED ORAL at 18:25

## 2025-04-24 RX ADMIN — METHOCARBAMOL 500 MILLIGRAM(S): 500 TABLET, FILM COATED ORAL at 05:34

## 2025-04-24 RX ADMIN — ATORVASTATIN CALCIUM 80 MILLIGRAM(S): 80 TABLET, FILM COATED ORAL at 21:34

## 2025-04-24 RX ADMIN — INSULIN LISPRO 4: 100 INJECTION, SOLUTION INTRAVENOUS; SUBCUTANEOUS at 08:45

## 2025-04-24 RX ADMIN — Medication 3 MILLIGRAM(S): at 21:34

## 2025-04-24 RX ADMIN — SACUBITRIL AND VALSARTAN 1 TABLET(S): 6; 6 PELLET ORAL at 18:22

## 2025-04-24 RX ADMIN — Medication 1000 MILLIGRAM(S): at 18:23

## 2025-04-24 RX ADMIN — Medication 40 MILLIGRAM(S): at 05:35

## 2025-04-24 RX ADMIN — Medication 975 MILLIGRAM(S): at 05:33

## 2025-04-24 NOTE — PROGRESS NOTE ADULT - SUBJECTIVE AND OBJECTIVE BOX
NEPHROLOGY INTERVAL HPI/OVERNIGHT EVENTS:    Examined earlier  No distress     MEDICATIONS  (STANDING):  acetaminophen     Tablet .. 975 milliGRAM(s) Oral every 8 hours  aspirin  chewable 81 milliGRAM(s) Oral daily  atorvastatin 80 milliGRAM(s) Oral at bedtime  carvedilol 6.25 milliGRAM(s) Oral every 12 hours  dextrose 5%. 1000 milliLiter(s) (100 mL/Hr) IV Continuous <Continuous>  dextrose 5%. 1000 milliLiter(s) (50 mL/Hr) IV Continuous <Continuous>  dextrose 50% Injectable 25 Gram(s) IV Push once  dextrose 50% Injectable 12.5 Gram(s) IV Push once  dextrose 50% Injectable 25 Gram(s) IV Push once  gabapentin 400 milliGRAM(s) Oral daily  glucagon  Injectable 1 milliGRAM(s) IntraMuscular once  hydrALAZINE 50 milliGRAM(s) Oral three times a day  insulin glargine Injectable (LANTUS) 5 Unit(s) SubCutaneous at bedtime  insulin lispro (ADMELOG) corrective regimen sliding scale   SubCutaneous three times a day before meals  isosorbide   mononitrate ER Tablet (IMDUR) 60 milliGRAM(s) Oral daily  lidocaine   4% Patch 1 Patch Transdermal every 24 hours  melatonin 3 milliGRAM(s) Oral at bedtime  methocarbamol 500 milliGRAM(s) Oral two times a day  pantoprazole    Tablet 40 milliGRAM(s) Oral before breakfast  sacubitril 97 mG/valsartan 103 mG 1 Tablet(s) Oral two times a day  sertraline 25 milliGRAM(s) Oral daily  spironolactone 25 milliGRAM(s) Oral daily  ticagrelor 90 milliGRAM(s) Oral every 12 hours    MEDICATIONS  (PRN):  aluminum hydroxide/magnesium hydroxide/simethicone Suspension 30 milliLiter(s) Oral every 4 hours PRN Dyspepsia  dextrose Oral Gel 15 Gram(s) Oral once PRN Blood Glucose LESS THAN 70 milliGRAM(s)/deciliter  loperamide 2 milliGRAM(s) Oral two times a day PRN Diarrhea      Allergies    No Known Allergies    Intolerances        Vital Signs Last 24 Hrs  T(C): 36.7 (24 Apr 2025 07:10), Max: 36.7 (24 Apr 2025 07:10)  T(F): 98 (24 Apr 2025 07:10), Max: 98 (24 Apr 2025 07:10)  HR: 60 (24 Apr 2025 07:10) (51 - 60)  BP: 124/69 (24 Apr 2025 07:10) (110/57 - 144/72)  BP(mean): --  RR: 18 (24 Apr 2025 07:10) (17 - 18)  SpO2: 94% (24 Apr 2025 07:10) (94% - 100%)    Parameters below as of 24 Apr 2025 07:10  Patient On (Oxygen Delivery Method): room air      PHYSICAL EXAM:  GENERAL: NAD weak  HEAD: NCAT  EYES: EOMI  NECK: Supple, No JVD  NERVOUS SYSTEM:  Alert & Oriented X3  CHEST/LUNG: Diminished BS noted  HEART: Regular rate and rhythm; No murmur  ABDOMEN: Soft, Nontender, +BS  EXTREMITIES: + dependent edema    LABS:                        8.9    10.00 )-----------( 393      ( 24 Apr 2025 05:01 )             27.7     04-24    130[L]  |  93[L]  |  42.3[H]  ----------------------------<  196[H]  4.8   |  19.0[L]  |  1.96[H]    Ca    8.3[L]      24 Apr 2025 05:01      PT/INR - ( 24 Apr 2025 05:01 )   PT: 15.1 sec;   INR: 1.34 ratio         PTT - ( 24 Apr 2025 05:01 )  PTT:31.9 sec  Urinalysis Basic - ( 24 Apr 2025 05:01 )    Color: x / Appearance: x / SG: x / pH: x  Gluc: 196 mg/dL / Ketone: x  / Bili: x / Urobili: x   Blood: x / Protein: x / Nitrite: x   Leuk Esterase: x / RBC: x / WBC x   Sq Epi: x / Non Sq Epi: x / Bacteria: x              RADIOLOGY & ADDITIONAL TESTS:

## 2025-04-24 NOTE — DISCHARGE NOTE PROVIDER - NSDCFUADDINST_GEN_ALL_CORE_FT
Follow up with the [facility] Electrophysiology team in 4-6 weeks. Our office will contact you in 3-5 days to schedule this appointment. Please call [phone] with questions or concerns. Follow up with the [facility]rophys[facility] team in 1-2 weeks. Our office will contact you in 3-5 days to schedule this appointment. Please call [phone] with questions or concerns.    Remove Aquacell dressing over the defibrillator site on 5/1/25

## 2025-04-24 NOTE — DISCHARGE NOTE PROVIDER - HOSPITAL COURSE
73 y/o female with PMH of ICM/HFrEF (LVEF 35-40%), CAD s/p PCI, Mobitz II s/p PPM (MD NereydaT), HTN, HLD, PAD with chronic right LE wound, DM2, and CKD3, who was BIBA from CHF clinic due to hypervolemia, weight gain of ~20 lbs in the past 9 days despite escalation of PO diuretics.    She was recently admitted on 3/19/25 with c/o CP and found to have NSTEMI. She underwent LHC on 3/21 which showed mid-distal LAD severe stenosis, OM1 and OM2 severe stenosis, mid ISR 50%, distal ISR 99%, s/p PCI with 1 ZEINAB to RCA.  On 3/22 she had a fever with leukocytosis and her blood cultures were positive for E. Coli/ESBL. Her CT A/P showed left pyelonephritis. She also had an BRADY for which her Entresto was held. She was discharged home 3/31 and her CardioMEMS was not at goal and had been rising. She was advised to increase Torsemide 40 mg daily to twice daily on 4/4. Despite this, she has continued to gain weight and reports poor UOP response.     Admission labwork: Na 125, K 4.6, BUN 37.7, Cr 1.94, eGFR 27, Mg 2.3, proBNP 35376, CXR: increased interstitial markings bilaterally, cardiomegaly.   On 4/10 she developed a fever, tmax 100.4F. RVP negative, empirically started on IV Zosyn . urine cx +  4/15 initiated on continuous Bumex infusion for inadequate response to intermittent dosing.  4/21: Received 1 dose of metolazone 5 mg and Bumex gtt switched to 4 mg IV BID.  4/24: s/p CRT-D  5/5: Started on  @ 2.5 mcg/kg/min over weekend.   5/6: No significant improvement in renal function w/  inotrope assisted diuresis. DC . Switch to oral Bumex w/ close monitoring of renal fx.   Pt is with urinary symptoms 53 started iv invanz and completed 5 days course , urine cx ESBL senstive to invanz   Pt is being followed by HF sarah , nephrology , stable

## 2025-04-24 NOTE — CONSULT NOTE ADULT - ASSESSMENT
74 F PMH HTN, hyperlipidemia, CAD, CHF s/p CardioMEMS, Mobitz II s/p pacemaker, PAD, CVA, DM2 (A1c 7.1), CKD3, recent admission in March 2025 for CHF exacerbation, pyelonephritis c/b ESBL E Coli bacteremia s/p antibiotic course, who presented here with RAMIREZ.     History of ESBL E Coli bacteremia due to pyelonephritis in March 2025, s/p antibiotic course   CHF s/p CardioMEMS  Mobitz II s/p pacemaker (Micra)   Acute on chronic CHF exacerbation   BNP ~68k   Abnormal echocardiogram, EF 20 to 25%  R knee pain/effusion, improved, arthrocentesis negative for infection (joint fluid PCR and joint culture negative)  Plan for Micra upgrade to CRT device    Suggest;  The patient is afebrile and without leukocytosis  She has one peripheral IV with no evidence of phlebitis on exam   Monitor fever curve  Trend WBC count    Patient is likely colonized with ESBL E. Coli in her urine (urine culture with ESBL E. Coli in March 2024, Oct 2024, Dec 2024, and March 2025). She did have ESBL E. Coli bacteremia secondary to pyelonephritis in March 2025, but when I saw her at the time she had been having dysuria for a while which likely lead to an ascending UTI and subsequent pyelonephritis and bacteremia. Would re- patient that she should not ignore any warning signs/symptoms such as dysuria, as early treatment with an oral antibiotic such as Macrobid could prevent worsening / ascending infection.     Gram negative organisms are much less likely to cause pacemaker infections compared to gram positive organisms. I do not think the patient would benefit from prophylactic antibiotics as this would likely lead to further development of resistance and/or potential side effects.     Case discussed with pt and her son at bedside and with Dr Vides.     Thank you for this consult. Inpatient ID consult team will sign off.  Further changes in lab values, imaging studies, or clinical status will not be known to ID inpatient consultants unless specifically communicated by primary team.

## 2025-04-24 NOTE — PROGRESS NOTE ADULT - SUBJECTIVE AND OBJECTIVE BOX
[facility] Division of Hospital Medicine    Chief Complaint:      SUBJECTIVE / OVERNIGHT EVENTS:    Patient seen and examined at bedside, no acute events overnight, patient denies any new complaints. Taken for Cardiac MR this AM, planned for device upgrade with EP this afternoon. will continue to monitor with EP / HF team. ID consulted for potential prophylactic ABx recs.     MEDICATIONS  (STANDING):  acetaminophen     Tablet .. 975 milliGRAM(s) Oral every 8 hours  aspirin  chewable 81 milliGRAM(s) Oral daily  atorvastatin 80 milliGRAM(s) Oral at bedtime  carvedilol 6.25 milliGRAM(s) Oral every 12 hours  dextrose 5%. 1000 milliLiter(s) (100 mL/Hr) IV Continuous <Continuous>  dextrose 5%. 1000 milliLiter(s) (50 mL/Hr) IV Continuous <Continuous>  dextrose 50% Injectable 25 Gram(s) IV Push once  dextrose 50% Injectable 12.5 Gram(s) IV Push once  dextrose 50% Injectable 25 Gram(s) IV Push once  gabapentin 400 milliGRAM(s) Oral daily  glucagon  Injectable 1 milliGRAM(s) IntraMuscular once  hydrALAZINE 50 milliGRAM(s) Oral three times a day  insulin glargine Injectable (LANTUS) 5 Unit(s) SubCutaneous at bedtime  insulin lispro (ADMELOG) corrective regimen sliding scale   SubCutaneous three times a day before meals  isosorbide   mononitrate ER Tablet (IMDUR) 60 milliGRAM(s) Oral daily  lidocaine   4% Patch 1 Patch Transdermal every 24 hours  melatonin 3 milliGRAM(s) Oral at bedtime  methocarbamol 500 milliGRAM(s) Oral two times a day  pantoprazole    Tablet 40 milliGRAM(s) Oral before breakfast  sacubitril 97 mG/valsartan 103 mG 1 Tablet(s) Oral two times a day  sertraline 25 milliGRAM(s) Oral daily  spironolactone 25 milliGRAM(s) Oral daily  ticagrelor 90 milliGRAM(s) Oral every 12 hours    MEDICATIONS  (PRN):  aluminum hydroxide/magnesium hydroxide/simethicone Suspension 30 milliLiter(s) Oral every 4 hours PRN Dyspepsia  dextrose Oral Gel 15 Gram(s) Oral once PRN Blood Glucose LESS THAN 70 milliGRAM(s)/deciliter  loperamide 2 milliGRAM(s) Oral two times a day PRN Diarrhea        I&O's Summary    23 Apr 2025 07:01  -  24 Apr 2025 07:00  --------------------------------------------------------  IN: 570 mL / OUT: 1600 mL / NET: -1030 mL        PHYSICAL EXAM:  Vital Signs Last 24 Hrs  T(C): 36.7 (24 Apr 2025 07:10), Max: 36.7 (24 Apr 2025 07:10)  T(F): 98 (24 Apr 2025 07:10), Max: 98 (24 Apr 2025 07:10)  HR: 60 (24 Apr 2025 07:10) (51 - 60)  BP: 124/69 (24 Apr 2025 07:10) (110/57 - 144/72)  BP(mean): --  RR: 18 (24 Apr 2025 07:10) (17 - 18)  SpO2: 94% (24 Apr 2025 07:10) (94% - 100%)    Parameters below as of 24 Apr 2025 07:10  Patient On (Oxygen Delivery Method): room air      CONSTITUTIONAL: NAD, on NC  RESPIRATORY: Normal respiratory effort; lungs are clear to auscultation bilaterally  CARDIOVASCULAR: Regular rate and rhythm   ABDOMEN: Nontender to palpation, normoactive bowel sounds  MSK: ROM wnl in extremities x4, R. knee improved    LABS:                        8.9    10.00 )-----------( 393      ( 24 Apr 2025 05:01 )             27.7     04-24    130[L]  |  93[L]  |  42.3[H]  ----------------------------<  196[H]  4.8   |  19.0[L]  |  1.96[H]    Ca    8.3[L]      24 Apr 2025 05:01      PT/INR - ( 24 Apr 2025 05:01 )   PT: 15.1 sec;   INR: 1.34 ratio         PTT - ( 24 Apr 2025 05:01 )  PTT:31.9 sec      Urinalysis Basic - ( 24 Apr 2025 05:01 )    Color: x / Appearance: x / SG: x / pH: x  Gluc: 196 mg/dL / Ketone: x  / Bili: x / Urobili: x   Blood: x / Protein: x / Nitrite: x   Leuk Esterase: x / RBC: x / WBC x   Sq Epi: x / Non Sq Epi: x / Bacteria: x        CAPILLARY BLOOD GLUCOSE      POCT Blood Glucose.: 216 mg/dL (24 Apr 2025 07:40)  POCT Blood Glucose.: 176 mg/dL (23 Apr 2025 21:06)  POCT Blood Glucose.: 176 mg/dL (23 Apr 2025 17:06)        RADIOLOGY & ADDITIONAL TESTS:  Results Reviewed:   Imaging Personally Reviewed:  Electrocardiogram Personally Reviewed:

## 2025-04-24 NOTE — DISCHARGE NOTE PROVIDER - PROVIDER TOKENS
PROVIDER:[TOKEN:[[id number]]] PROVIDER:[TOKEN:[[id number]]],PROVIDER:[TOKEN:[[id number]],FOLLOWUP:[2 weeks]]

## 2025-04-24 NOTE — DISCHARGE NOTE PROVIDER - NSDCCPCAREPLAN_GEN_ALL_CORE_FT
PRINCIPAL DISCHARGE DIAGNOSIS  Diagnosis: Acute on chronic HFrEF (heart failure with reduced ejection fraction)  Assessment and Plan of Treatment: continue cardiac meds , follow up with herat failure doctor      SECONDARY DISCHARGE DIAGNOSES  Diagnosis: Urinary tract infection due to ESBL Klebsiella  Assessment and Plan of Treatment:     Diagnosis: Renal failure (ARF), acute on chronic  Assessment and Plan of Treatment:     Diagnosis: CAD (coronary artery disease)  Assessment and Plan of Treatment:     Diagnosis: Hyponatremia  Assessment and Plan of Treatment:     Diagnosis: Right knee pain  Assessment and Plan of Treatment:     Diagnosis: Diabetes mellitus  Assessment and Plan of Treatment:

## 2025-04-24 NOTE — DISCHARGE NOTE PROVIDER - CARE PROVIDER_API CALL
Jam Santana  Cardiac Electrophysiology  [address] [place] [address]  Phone: [phone]  Fax: [phone]  Follow Up Time:    Jam Santana  Cardiac Electrophysiology  [address] [place] [address]  Phone: [phone]  Fax: [phone]  Follow Up Time:     Raeann Gordon  Adv Heart Fail Trnsplnt Cardio  [address] [place] [id number]  Phone: [phone]  Fax: [phone]  Follow Up Time: 2 weeks

## 2025-04-24 NOTE — PROGRESS NOTE ADULT - ASSESSMENT
73 yo female (known to our service) + CKD(III) + ICM (EF 25-30%) admitted with decompensated CHF  Despite ongoing diuresis based on CardioMEMS data she has failed to reach her goal    CKD stage III renal function seems to be near baseline  - pt s/p Bumex infusion    - ok to cont Entresto as per cardiology  - can consider RRT to augment UF if medical management proves inadequate, no need thus far  - Low potassium cont supplementation    HypoNatremia - suspect dilutional    Monitor labs will follow

## 2025-04-24 NOTE — CONSULT NOTE ADULT - SUBJECTIVE AND OBJECTIVE BOX
[facility]  INFECTIOUS DISEASES at [place] / [place] / [place]  =======================================================                              Leo Lam MD                              Professor Emeritus:  Dr Angelo Sebastian MD            Diplomates American Board of Internal Medicine & Infectious Diseases                                   Tel  [phone] Fax [phone]                                  Hospital Consult line:  [phone]  =======================================================    Patient is a 74 F who presents with a chief complaint of RAMIREZ     HPI:  74 F PMH HTN, hyperlipidemia, CAD, CHF s/p CardioMEMS, Mobitz II s/p pacemaker, PAD, CVA, DM2 (A1c 7.1), CKD3, recent admission in March 2025 for CHF exacerbation, pyelonephritis c/b ESBL E Coli bacteremia s/p antibiotic course, who presented here with RAMIREZ.     Found to have acute on chronic CHF exacerbation. Diuresed. Patient was seen by HF Team, s/p cardiac catheterization and cardiac MRI. Plan for pacemaker upgrade to CRT device. Course complicated by R knee pain/effusion which has since improved (s/p arthrocentesis, joint fluid PCR and culture were negative). ID consulted for input per request by EP given plan for pacemaker upgrade to CRT device.     REVIEW OF SYSTEMS  Constitutional: No fevers, no chills   Skin: No rash 	  Eyes: No discharge	  ENMT: No sore throat   Respiratory: No cough   Cardiovascular: No chest pain  Gastrointestinal: No pain  Genitourinary: No dysuria  Neurological: No AMS     prior hospital charts reviewed [V]  primary team notes reviewed [V]  other consultant notes reviewed [V]    PAST MEDICAL & SURGICAL HISTORY:  DM (diabetes mellitus)    HTN (hypertension)    H/O gastroesophageal reflux (GERD)    Cardiac pacemaker  MICRA for mobitz type 11    CVA (cerebrovascular accident)  resdiual right sided weakness    CVA (cerebrovascular accident)    PAD (peripheral artery disease)    Wound of right leg    History of benign brain tumor    Cataract    History of biopsy    Cardiac pacemaker    S/P angiogram of extremity    SOCIAL HISTORY:  Non contributory     FAMILY HISTORY:  FH: asthma  Son    Allergies  No Known Allergies    ANTIMICROBIALS:    ANTIMICROBIALS (past 90 days):  MEDICATIONS  (STANDING):  piperacillin/tazobactam IVPB.   200 mL/Hr IV Intermittent (04-11-25 @ 04:20)    piperacillin/tazobactam IVPB.-   25 mL/Hr IV Intermittent (04-11-25 @ 07:54)    piperacillin/tazobactam IVPB..   25 mL/Hr IV Intermittent (04-15-25 @ 21:28)   25 mL/Hr IV Intermittent (04-15-25 @ 13:14)   25 mL/Hr IV Intermittent (04-15-25 @ 05:23)   25 mL/Hr IV Intermittent (04-14-25 @ 22:03)   25 mL/Hr IV Intermittent (04-14-25 @ 13:05)   25 mL/Hr IV Intermittent (04-14-25 @ 05:07)   25 mL/Hr IV Intermittent (04-13-25 @ 21:51)   25 mL/Hr IV Intermittent (04-13-25 @ 13:40)   25 mL/Hr IV Intermittent (04-13-25 @ 06:43)   25 mL/Hr IV Intermittent (04-12-25 @ 21:55)   25 mL/Hr IV Intermittent (04-12-25 @ 13:15)   25 mL/Hr IV Intermittent (04-12-25 @ 05:05)   25 mL/Hr IV Intermittent (04-11-25 @ 21:21)   25 mL/Hr IV Intermittent (04-11-25 @ 14:34)    OTHER MEDS:   MEDICATIONS  (STANDING):  acetaminophen     Tablet .. 975 every 8 hours  aluminum hydroxide/magnesium hydroxide/simethicone Suspension 30 every 4 hours PRN  aspirin  chewable 81 daily  atorvastatin 80 at bedtime  carvedilol 6.25 every 12 hours  dextrose 50% Injectable 25 once  dextrose 50% Injectable 12.5 once  dextrose 50% Injectable 25 once  dextrose Oral Gel 15 once PRN  gabapentin 400 daily  glucagon  Injectable 1 once  hydrALAZINE 50 three times a day  insulin glargine Injectable (LANTUS) 5 at bedtime  insulin lispro (ADMELOG) corrective regimen sliding scale  three times a day before meals  isosorbide   mononitrate ER Tablet (IMDUR) 60 daily  loperamide 2 two times a day PRN  melatonin 3 at bedtime  methocarbamol 500 two times a day  pantoprazole    Tablet 40 before breakfast  sacubitril 97 mG/valsartan 103 mG 1 two times a day  sertraline 25 daily  spironolactone 25 daily  ticagrelor 90 every 12 hours    VITALS:  Vital Signs Last 24 Hrs  T(F): 98 (04-24-25 @ 07:10), Max: 100.9 (04-19-25 @ 21:33)    Vital Signs Last 24 Hrs  HR: 60 (04-24-25 @ 07:10) (51 - 67)  BP: 124/69 (04-24-25 @ 07:10) (110/57 - 156/78)  RR: 18 (04-24-25 @ 07:10)  SpO2: 94% (04-24-25 @ 07:10) (94% - 100%)  Wt(kg): --    EXAM:  General: Patient in no acute distress   HEENT: NCAT, EOMI  CV: S1+S2  Lungs: No respiratory distress, CTAB, on room air   Abd: Soft, nontender, no guarding  Ext: No cyanosis  Neuro: Alert and oriented, calm   Skin: No rash   IV: No phlebitis, peripheral IV    Labs:                        8.9    10.00 )-----------( 393      ( 24 Apr 2025 05:01 )             27.7     04-24    130[L]  |  93[L]  |  42.3[H]  ----------------------------<  196[H]  4.8   |  19.0[L]  |  1.96[H]    Ca    8.3[L]      24 Apr 2025 05:01    WBC Trend:  WBC Count: 10.00 (04-24-25 @ 05:01)  WBC Count: 10.37 (04-23-25 @ 05:40)  WBC Count: 11.05 (04-22-25 @ 10:00)  WBC Count: 13.17 (04-21-25 @ 12:00)    Auto Neutrophil #: 7.38 K/uL (04-24-25 @ 05:01)  Auto Neutrophil #: 7.86 K/uL (04-23-25 @ 05:40)  Auto Neutrophil #: 10.58 K/uL (04-21-25 @ 12:00)  Auto Neutrophil #: 8.23 K/uL (04-10-25 @ 06:54)  Auto Neutrophil #: 7.09 K/uL (04-09-25 @ 10:04)    Creatine Trend:  Creatinine: 1.96 (04-24)  Creatinine: 1.88 (04-23)  Creatinine: 1.78 (04-22)  Creatinine: 1.84 (04-21)    Liver Biochemical Testing Trend:  Alanine Aminotransferase (ALT/SGPT): 12 (04-22)  Alanine Aminotransferase (ALT/SGPT): 10 (04-21)  Alanine Aminotransferase (ALT/SGPT): 21 (04-11)  Alanine Aminotransferase (ALT/SGPT): 34 *H* (04-09)  Alanine Aminotransferase (ALT/SGPT): 17 (03-28)  Aspartate Aminotransferase (AST/SGOT): 22 (04-22-25 @ 07:09)  Aspartate Aminotransferase (AST/SGOT): 16 (04-21-25 @ 12:00)  Aspartate Aminotransferase (AST/SGOT): 16 (04-11-25 @ 00:46)  Aspartate Aminotransferase (AST/SGOT): 34 (04-09-25 @ 10:04)  Aspartate Aminotransferase (AST/SGOT): 34 (03-28-25 @ 06:35)  Bilirubin Total: 0.7 (04-22)  Bilirubin Total: 0.6 (04-21)  Bilirubin Total: 0.6 (04-11)  Bilirubin Total: 0.7 (04-09)  Bilirubin Total: 0.8 (03-28)    Auto Eosinophil %: 4.6 % (04-24-25 @ 05:01)  Auto Eosinophil %: 2.1 % (04-23-25 @ 05:40)  Auto Eosinophil %: 0.9 % (04-21-25 @ 12:00)    Urinalysis Basic - ( 24 Apr 2025 05:01 )    Color: x / Appearance: x / SG: x / pH: x  Gluc: 196 mg/dL / Ketone: x  / Bili: x / Urobili: x   Blood: x / Protein: x / Nitrite: x   Leuk Esterase: x / RBC: x / WBC x   Sq Epi: x / Non Sq Epi: x / Bacteria: x    MICROBIOLOGY:    MRSA PCR Result.: NotDetec (03-20-25 @ 13:30)  MRSA PCR Result.: NotDetec (01-13-25 @ 04:00)  MRSA PCR Result.: NotDetec (10-14-24 @ 04:00)  MRSA PCR Result.: NotDetec (07-12-24 @ 17:45)  MRSA PCR Result.: NotDetec (05-10-24 @ 10:10)    Culture - Blood (collected 11 Apr 2025 00:52)  Source: Blood Blood-Peripheral  Final Report:    No growth at 5 days    Culture - Blood (collected 11 Apr 2025 00:46)  Source: Blood Blood-Peripheral  Final Report:    No growth at 5 days    Culture - Urine (collected 27 Mar 2025 23:54)  Source: Clean Catch Clean Catch (Midstream)  Final Report:    No growth    Culture - Blood (collected 26 Mar 2025 20:15)  Source: Blood Blood  Final Report:    No growth at 5 days    Culture - Blood (collected 25 Mar 2025 08:09)  Source: Blood Blood-Peripheral  Final Report:    No growth at 5 days    Culture - Blood (collected 25 Mar 2025 08:00)  Source: Blood Blood-Peripheral  Final Report:    No growth at 5 days    Culture - Blood (collected 24 Mar 2025 05:28)  Source: Blood Blood-Venous  Final Report:    No growth at 5 days    Culture - Blood (collected 24 Mar 2025 05:20)  Source: Blood Blood-Venous  Final Report:    Growth in aerobic bottle: Escherichia coli ESBL    See previous culture [id number]    Culture - Urine (collected 22 Mar 2025 15:30)  Source: Catheterized Catheterized  Final Report:    >100,000 CFU/ml Escherichia coli ESBL  Organism: Escherichia coli ESBL  Organism: Escherichia coli ESBL    Sensitivities:      Method Type: RENNY      -  Ampicillin: R >16 These ampicillin results predict results for amoxicillin      -  Ampicillin/Sulbactam: R >16/8      -  Aztreonam: R >16      -  Cefazolin: R >16 For uncomplicated UTI with K. pneumoniae, E. coli, or P. mirablis: RENNY <=16 is sensitive and RENNY >=32 is resistant. This also predicts results for oral agents cefaclor, cefdinir, cefpodoxime, cefprozil, cefuroxime axetil, cephalexin and locarbef for uncomplicated UTI. Note that some isolates may be susceptible to these agents while testing resistant to cefazolin.      -  Cefepime: R >16      -  Ceftriaxone: R >32      -  Cefuroxime: R >16      -  Ciprofloxacin: R >2      -  Ertapenem: S <=0.5      -  Gentamicin: S <=2      -  Imipenem: S <=1      -  Levofloxacin: R >4      -  Meropenem: S <=1      -  Nitrofurantoin: S <=32 Should not be used to treat pyelonephritis      -  Piperacillin/Tazobactam: S <=8      -  Tobramycin: S <=2      -  Trimethoprim/Sulfamethoxazole: R >2/38    Culture - Blood (collected 22 Mar 2025 15:20)  Source: Blood Blood-Peripheral  Final Report:    Growth in aerobic and anaerobic bottles: Escherichia coli ESBL    Direct identification is available within approximately 3-5    hours either by Blood Panel Multiplexed PCR or Direct    MALDI-TOF. Details: [email or web address]  Organism: Blood Culture PCR  Escherichia coli ESBL  Organism: Escherichia coli ESBL    Sensitivities:      Method Type: RENNY      -  Ampicillin: R >16 These ampicillin results predict results for amoxicillin      -  Ampicillin/Sulbactam: R >16/8      -  Aztreonam: R >16      -  Cefazolin: R >16      -  Cefepime: R >16      -  Ceftriaxone: R >32      -  Ciprofloxacin: R >2      -  Ertapenem: S <=0.5      -  Gentamicin: S <=2      -  Imipenem: S <=1      -  Levofloxacin: R >4      -  Meropenem: S <=1      -  Piperacillin/Tazobactam: R <=8      -  Tobramycin: S <=2      -  Trimethoprim/Sulfamethoxazole: R >2/38  Organism: Blood Culture PCR    Sensitivities:      Method Type: PCR      -  Escherichia coli: Detec      -  ESBL: Detec      -  CTX-M Resistance Marker: Detec    Rapid RVP Result: NotDetec (04-20 @ 09:36)    A1C with Estimated Average Glucose Result: 7.1 % (04-23-25 @ 05:40)  A1C with Estimated Average Glucose Result: 8.5 % (03-22-25 @ 04:45)

## 2025-04-24 NOTE — PROGRESS NOTE ADULT - ASSESSMENT
74yoF with a history of coronary artery disease, heart failure, chronic kidney disease, hypertension, diabetes, and anemia who was recently hospitalized for NSTEMI with PCI and bacteremia who presented with dyspnea on exertion and weight gain. Continues on Bumex drip as per cardiology additional diuretics with metolazone / Bumex IV given 4/21. s/p repeat heart cath 4/22 with no need for MEMS recalibration however plan for upgarde to CRT-P vs CRT-D device 4/24, s/p Cardiac MR.       #Acute on chronic systolic heart failure  - Recent echocardiogram noted ejection fraction of 31% with multiple segmental abnormalities  - Diuresis as per HF guidance, holding Bumex at this time   s/p Metolazone 4/21, with Bumex IV push  last diuretic dose 4/22 PM with IV bumex 4 mg   - Strict in and out  - Daily weights  - On metoprolol, Imdur 60 mg daily, spironolactone, Entresto full dose, HDZN increased to 50 mg TID,   - Heart Failure on board recs appreciated,  planning with EP for CRT device upgrade 4/24 if able, Cardiac MR performed to assess scar burden   remains NPO at this time   - Given patients recurrent UTI's / history of bacteremia, EP requesting ID input for potential abx's prior to device upgrade / implant, consulted, recs appreciated, no rec for prophylactic abx  -s/p repeat Heart cath 4/22, no recalibration of Mems needed   - pt with reproducible chest wall pain, started on pain regimen, remains intermittent with improvement with GI cocktail / pain medications, continue to monitor     #Right knee pain  - knee xray showing effusion, possible in setting of fall  - c/w current pain regimen, adjust as needed   - CT knee result noted  - ortho consulted, sp aspiration fluid neg for crystals/infections  ROM improving     #Fever  resolved   noted febrile episode 4/19 overnight to 100.9, repeat RVP, s/p Abxs with Zosyn, continue to monitor off Abx at this time   - CXR noted with ?Atelectasis vs edema vs infx  - sp Zosyn x 5 days   - swallow eval result noted, diet advanced   - Bcx NGTD    #Diarrhea  resolved   - possibly abx related  - GI pcr neg, PRN imodium started    #Coronary artery disease  - Continue on aspirin, ticagrelor, and atorvastatin  - Recent cardiac catheterization with PCI, Brillinta dosage increased to 90 BID from 60 as per HF  - initial trop 140 -> 118  - pt with recurrent chest pain, high up in chest, trop downtrended   - c/w maalox, pain improves with GI cocktail, as above consideration for repeat heart cath this admission     #Chronic kidney disease  - Renal function improved from prior values  - Continue to monitor while on diuresis    #Hyponatremia  improved   - Hypervolemic on presentation  - Bumetanide for diuresis, may adjust as above addition of metolazone / bumex IV push, may hold bumex drip and monitor ouptut     #Diabetes  - Insulin coverage  - Close monitoring of blood glucose levels  - Gabapentin for neuropathy  - need Glucometer on discharge     DVT ppx: ASA/Brilinta   Dispo: Pending HF optimization, EP procedure 4/24   PT veronica REEVES, but family prefers home

## 2025-04-24 NOTE — DISCHARGE NOTE PROVIDER - NSDCMRMEDTOKEN_GEN_ALL_CORE_FT
aspirin 81 mg oral delayed release tablet: 1 tab(s) orally once a day  atorvastatin 80 mg oral tablet: 1 tab(s) orally once a day (at bedtime)  ferrous sulfate 325 mg (65 mg elemental iron) oral tablet: 1 tab(s) orally once a day  gabapentin 800 mg oral tablet: 0.5 tab(s) orally once a day  hydrALAZINE 10 mg oral tablet: 1 tab(s) orally 3 times a day  insulin glargine 100 units/mL subcutaneous solution: 26 unit(s) subcutaneous once a day (at bedtime)  Metoprolol Succinate ER 50 mg oral tablet, extended release: 1 tab(s) orally once a day (at bedtime)  ranolazine 500 mg oral tablet, extended release: 1 tab(s) orally 2 times a day  sodium bicarbonate 650 mg oral tablet: 2 tab(s) orally 2 times a day  ticagrelor 90 mg oral tablet: 1 tab(s) orally 2 times a day  torsemide 40 mg oral tablet: 1 tab(s) orally once a day   acetaminophen 325 mg oral tablet: 3 tab(s) orally every 8 hours  aspirin 81 mg oral delayed release tablet: 1 tab(s) orally once a day  atorvastatin 80 mg oral tablet: 1 tab(s) orally once a day (at bedtime)  bumetanide 2 mg oral tablet: 2 tab(s) orally once a day  ferrous sulfate 325 mg (65 mg elemental iron) oral tablet: 1 tab(s) orally once a day  gabapentin 800 mg oral tablet: 0.5 tab(s) orally once a day  hydrALAZINE 25 mg oral tablet: 1 tab(s) orally 3 times a day  insulin glargine 100 units/mL subcutaneous solution: 26 unit(s) subcutaneous once a day (at bedtime)  isosorbide dinitrate 20 mg oral tablet: 1 tab(s) orally 3 times a day  metoprolol succinate 50 mg oral tablet, extended release: 1 tab(s) orally once a day (at bedtime)  pantoprazole 40 mg oral delayed release tablet: 1 tab(s) orally once a day (before a meal)  potassium chloride 20 mEq oral tablet, extended release: 1 tab(s) orally once a day  sodium bicarbonate 650 mg oral tablet: 2 tab(s) orally 2 times a day  ticagrelor 90 mg oral tablet: 1 tab(s) orally 2 times a day

## 2025-04-24 NOTE — DISCHARGE NOTE PROVIDER - NSDCFUSCHEDAPPT_GEN_ALL_CORE_FT
Dylan Canchola  [facility]  NEPHRO [address]  Scheduled Appointment: 06/10/2025    [facility]  CARDIOLOGY [address] [facility]  Scheduled Appointment: 06/24/2025     Edna Dimas  [facility]  ELECTROPH [address]  Scheduled Appointment: 05/13/2025    Dylan Canchola  [facility]  [address]  Scheduled Appointment: 06/10/2025    [facility]  CARDIOLOGY [address]  Scheduled Appointment: 06/24/2025

## 2025-04-24 NOTE — CHART NOTE - NSCHARTNOTEFT_GEN_A_CORE
RN called stating pt is having constant knee pain.   Pt is here for HF, was found to have effusion of the right knee. Was seen by ortho for aspiration. Currently receiving 975mg Tylenol TID for pain management.   Between Tylenol doses pt complains of pain was that keeps her up at night. VSS and no other areas of pain. Pt has received oxycodone in the past and reacted well to it.   - Oxycodone 5mg ordered  - RN to monitor pain response and alert PA if pain continues

## 2025-04-24 NOTE — PROGRESS NOTE ADULT - SUBJECTIVE AND OBJECTIVE BOX
Patient seen and evaluated at bedside alongside her son. Patient feeling well. NPO confirmed since last night. Will maintain NPO status into procedure Patient seen and evaluated at bedside in the cath holding room alongside her son in preparation for upgrade device. Patient feeling well. NPO confirmed since last night. Will maintain NPO status into procedure

## 2025-04-24 NOTE — PROGRESS NOTE ADULT - SUBJECTIVE AND OBJECTIVE BOX
PROCEDURE(S): *** Implant    ELECTROPHYSIOLOGIST(S): Jam Santana MD    COMPLICATIONS:  none          DISPOSITION:  Observation Unit           CONDITION: Stable    Pt doing well s/p *** . Denies complaint    Exam:   T(C): 36.7 (04-24-25 @ 07:10), Max: 36.7 (04-24-25 @ 07:10)  HR: 60 (04-24-25 @ 07:10) (51 - 60)  BP: 124/69 (04-24-25 @ 07:10) (110/57 - 144/72)  RR: 18 (04-24-25 @ 07:10) (17 - 18)  SpO2: 94% (04-24-25 @ 07:10) (94% - 100%)  Wt(kg): --    General: NAD, A&O x 3  Card: S1/S2, RRR, no m/g/r  LACW incision: Dermabond C/D/I no bleeding, hematoma, erythema or edema  Resp: lungs CTA b/l  Abd: S/NT/ND  Ext: no edema, distal pulses intact    EKG:     Assessment:   74 year old female patient with ICM, HFrEF (LVEF 25-30%), CAD s/p multiple PCI in past, Mobitz II AV block s/p MDT Micra AV leadless pacemaker insertion (4/2020), HTN, HLD, PAD with chronic right LE wound (hx of cellulitis), DM2, CKD3, and blood cultures positive for E. Coli/ESBL (3/2025 - pylenephritis/urosepsis) who is admitted with acute on chronic HFrEF exacerbation and BRADY., now status post uncomplicated ***.    Plan:   Port CXR now - r/o PTX or effusion, verify lead locations.   Bedrest x 4 hours post implant, then OOB w/ assist & progress as tolerated.    Continued observation on telemetry overnight.   Cont Ancef 2gm IV q 8 hours x 2 additional doses to complete 24 hour course.   Pain control with PO analgesia PRN.   NO HEPARIN OR LOVENOX, INCLUDING PROPHYLACTIC/SUBCUT DOSING, UNTIL OTHERWISE ADVISED BY EP.   Resume home medications.   PA/Lat CXR and device check in AM.   EP will follow up in AM PROCEDURE(S): *** Implant    ELECTROPHYSIOLOGIST(S): Jam Santana MD    COMPLICATIONS:  none               CONDITION: Stable    Pt doing well s/p *** . Denies complaint    Exam:   T(C): 36.7 (04-24-25 @ 07:10), Max: 36.7 (04-24-25 @ 07:10)  HR: 60 (04-24-25 @ 07:10) (51 - 60)  BP: 124/69 (04-24-25 @ 07:10) (110/57 - 144/72)  RR: 18 (04-24-25 @ 07:10) (17 - 18)  SpO2: 94% (04-24-25 @ 07:10) (94% - 100%)  Wt(kg): --    General: NAD, A&O x 3  Card: S1/S2, RRR, no m/g/r  LACW incision: Dermabond C/D/I no bleeding, hematoma, erythema or edema  Resp: lungs CTA b/l  Abd: S/NT/ND  Ext: no edema, distal pulses intact    EKG:     Assessment:   74 year old female patient with ICM, HFrEF (LVEF 25-30%), CAD s/p multiple PCI in past, Mobitz II AV block s/p MDT Micra AV leadless pacemaker insertion (4/2020), HTN, HLD, PAD with chronic right LE wound (hx of cellulitis), DM2, CKD3, and blood cultures positive for E. Coli/ESBL (3/2025 - pylenephritis/urosepsis) who is admitted with acute on chronic HFrEF exacerbation and BRADY., now status post uncomplicated ***.    Plan:   Port CXR now - r/o PTX or effusion, verify lead locations.   Bedrest x 4 hours post implant, then OOB w/ assist & progress as tolerated.    Continued observation on telemetry overnight.   Cont Ancef 2gm IV q 8 hours x 2 additional doses to complete 24 hour course.   Pain control with PO analgesia PRN.   NO HEPARIN OR LOVENOX, INCLUDING PROPHYLACTIC/SUBCUT DOSING, UNTIL OTHERWISE ADVISED BY EP.   Resume home medications.   PA/Lat CXR and device check in AM.   EP will follow up in AM PROCEDURE(S): CRT-D Implant    ELECTROPHYSIOLOGIST(S): Jam Santana MD    COMPLICATIONS:  none               CONDITION: Stable    Pt doing well s/p *** . Denies complaint    Exam:   T(C): 36.7 (04-24-25 @ 07:10), Max: 36.7 (04-24-25 @ 07:10)  HR: 60 (04-24-25 @ 07:10) (51 - 60)  BP: 124/69 (04-24-25 @ 07:10) (110/57 - 144/72)  RR: 18 (04-24-25 @ 07:10) (17 - 18)  SpO2: 94% (04-24-25 @ 07:10) (94% - 100%)  Wt(kg): --    General: NAD, A&O x 3  Card: S1/S2, RRR, no m/g/r  LACW incision: Dermabond C/D/I no bleeding, hematoma, erythema or edema  Resp: lungs CTA b/l  Abd: S/NT/ND  Ext: no edema, distal pulses intact    EKG:     Assessment:   74 year old female patient with ICM, HFrEF (LVEF 25-30%), CAD s/p multiple PCI in past, Mobitz II AV block s/p MDT Micra AV leadless pacemaker insertion (4/2020), HTN, HLD, PAD with chronic right LE wound (hx of cellulitis), DM2, CKD3, and blood cultures positive for E. Coli/ESBL (3/2025 - pylenephritis/urosepsis) who is admitted with acute on chronic HFrEF exacerbation and BRADY., now status post uncomplicated ***.    Plan:   Port CXR now - r/o PTX or effusion, verify lead locations.   Bedrest x 4 hours post implant, then OOB w/ assist & progress as tolerated.    Continued observation on telemetry overnight.   Cont Ancef 2gm IV q 8 hours x 2 additional doses to complete 24 hour course.   Pain control with PO analgesia PRN.   NO HEPARIN OR LOVENOX, INCLUDING PROPHYLACTIC/SUBCUT DOSING, UNTIL OTHERWISE ADVISED BY EP.   Resume home medications.   PA/Lat CXR and device check in AM.   EP will follow up in AM PROCEDURE(S): CRT-D Implant    ELECTROPHYSIOLOGIST(S): Jam Santana MD    COMPLICATIONS:  none               CONDITION: Stable    Pt doing well s/p CRT-D implant (Left bundle lead, RV ICD lead) with acces via left axillary stick. Denies complaint    Device settings  DDD    ms   ms    Exam:   T(C): 36.7 (04-24-25 @ 07:10), Max: 36.7 (04-24-25 @ 07:10)  HR: 60 (04-24-25 @ 07:10) (51 - 60)  BP: 124/69 (04-24-25 @ 07:10) (110/57 - 144/72)  RR: 18 (04-24-25 @ 07:10) (17 - 18)  SpO2: 94% (04-24-25 @ 07:10) (94% - 100%)  Wt(kg): --    General: NAD, A&O x 3  Card: S1/S2, RRR, no m/g/r  LACW incision: Dermabond C/D/I no bleeding, hematoma, erythema or edema  Resp: lungs CTA b/l  Abd: S/NT/ND  Ext: no edema, distal pulses intact    EKG: A sensed V paced @ 70 BPM    Assessment:   74 year old female patient with ICM, HFrEF (LVEF 25-30%), CAD s/p multiple PCI in past, Mobitz II AV block s/p MDT Micra AV leadless pacemaker insertion (4/2020), HTN, HLD, PAD with chronic right LE wound (hx of cellulitis), DM2, CKD3, and blood cultures positive for E. Coli/ESBL (3/2025 - pylenephritis/urosepsis) who is admitted with acute on chronic HFrEF exacerbation and BRADY., now status post uncomplicated CRT-D implant (Left bundle lead, RV ICD lead) with acces via left axillary stick.    Plan:   Port CXR now - no obvious pneumothorax or effusion, leads appear in correct locations.   Bedrest x 4 hours post implant, then OOB w/ assist & progress as tolerated.    Continued observation on telemetry overnight.   Cont Ancef 1gm IV q 12 hours (Renally dosed) x 2 additional doses to complete 24 hour course.   Discharge on Keflex 250 mg BID x 5 days (renally dosed) for prophylaxis.  Pain control with PO analgesia PRN.   NO HEPARIN OR LOVENOX, INCLUDING PROPHYLACTIC/SUBCUT DOSING, UNTIL OTHERWISE ADVISED BY EP.   Resume home medications.   PA/Lat CXR and device check in AM.   EP will follow up in AM PROCEDURE(S): CRT-D Implant    ELECTROPHYSIOLOGIST(S): Jam Santana MD    COMPLICATIONS:  none               CONDITION: Stable    Pt doing well s/p CRT-D implant (Left bundle lead, RV ICD lead) with acces via left axillary stick. Micra turned off. Denies complaint    Device settings  DDD    ms   ms    Exam:   T(C): 36.7 (04-24-25 @ 07:10), Max: 36.7 (04-24-25 @ 07:10)  HR: 60 (04-24-25 @ 07:10) (51 - 60)  BP: 124/69 (04-24-25 @ 07:10) (110/57 - 144/72)  RR: 18 (04-24-25 @ 07:10) (17 - 18)  SpO2: 94% (04-24-25 @ 07:10) (94% - 100%)  Wt(kg): --    General: NAD, A&O x 3  Card: S1/S2, RRR, no m/g/r  LACW incision: Dermabond C/D/I no bleeding, hematoma, erythema or edema  Resp: lungs CTA b/l  Abd: S/NT/ND  Ext: no edema, distal pulses intact    EKG: A sensed V paced @ 70 BPM    Assessment:   74 year old female patient with ICM, HFrEF (LVEF 25-30%), CAD s/p multiple PCI in past, Mobitz II AV block s/p MDT Micra AV leadless pacemaker insertion (4/2020), HTN, HLD, PAD with chronic right LE wound (hx of cellulitis), DM2, CKD3, and blood cultures positive for E. Coli/ESBL (3/2025 - pylenephritis/urosepsis) who is admitted with acute on chronic HFrEF exacerbation and BRADY., now status post uncomplicated CRT-D implant (Left bundle lead, RV ICD lead) with acces via left axillary stick.    Plan:   Port CXR now - no obvious pneumothorax or effusion, leads appear in correct locations.   Bedrest x 4 hours post implant, then OOB w/ assist & progress as tolerated.    Continued observation on telemetry overnight.   Cont Ancef 1gm IV q 12 hours (Renally dosed) x 2 additional doses to complete 24 hour course.   Discharge on Keflex 250 mg BID x 5 days (renally dosed) for prophylaxis.  Pain control with PO analgesia PRN.   NO HEPARIN OR LOVENOX, INCLUDING PROPHYLACTIC/SUBCUT DOSING, UNTIL OTHERWISE ADVISED BY EP.   Resume home medications.   PA/Lat CXR and device check in AM.   EP will follow up in AM

## 2025-04-24 NOTE — DISCHARGE NOTE PROVIDER - NSDCCPTREATMENT_GEN_ALL_CORE_FT
PRINCIPAL PROCEDURE  Procedure: Insertion of total cardiac resynchronization therapy defibrillator (CRT-D) system  Findings and Treatment: Cardiac Device Implant Post Operative Instructions  - Do not touch the incision until it is completely healed.   - There are Steristrips (white strips of tape) on your incision, which will start to peel off on their own over the next 2-3 weeks. Do not pick at or peel off the Steristrips.   - Bruising around the implant site or over the chest, side or arm near the incision is normal, and will take a few weeks to resolve.  -Do not lift the affected arm higher than 90 degrees (shoulder height) in any direction for 6 weeks.   - Do not push, pull or lift anything heavier than 10 lbs (about a gallon of milk) with the affected arm for 6 weeks.     - Do not apply soaps, creams, lotions, ointments or powders to the incision until it is completely healed.  - You may take a shower in 24 hours, and allow the water to run over the incision. However, do not submerge the incision in water: do not swim or soak in bath tubs, hot tubs, swimming pools, etc.   You should call the doctor if:   - You notice redness, drainage, swelling, increased tenderness, hot sensation around the incision, bleeding or incision edges pulling apart.  - Your temperature is greater than 100 degrees F for more than 24 hours.  - You notice swelling or bulging at the incision or around the device that was not there when you left the hospital or is increasing in size.  - You experience increased difficulty breathing.  - You notice new/worsening swelling in your legs and ankles.  - You faint or have dizzy spells.  - You have any questions or concerns regarding your device or the procedure.

## 2025-04-24 NOTE — DISCHARGE NOTE PROVIDER - ATTENDING DISCHARGE PHYSICAL EXAMINATION:
pt is seen this am   she is stable son at the bedside   no complaints   Vital Signs Last 24 Hrs  T(C): 36.9 (09 May 2025 07:24), Max: 36.9 (08 May 2025 16:32)  T(F): 98.5 (09 May 2025 07:24), Max: 98.5 (09 May 2025 07:24)  HR: 81 (09 May 2025 07:24) (69 - 98)  BP: 109/61 (09 May 2025 07:24) (109/61 - 145/74)  BP(mean): --  RR: 18 (09 May 2025 07:24) (16 - 19)  SpO2: 98% (09 May 2025 07:24) (97% - 100%)    Parameters below as of 09 May 2025 07:24  Patient On (Oxygen Delivery Method): room air    Lungs CTA   Heart Regular s1 s2   Abd soft BS +

## 2025-04-24 NOTE — CONSULT NOTE ADULT - CONSULT REASON
CKD, CHF
Plan for pacemaker upgrade
CHF
Evaluation for upgrade to CRT-P or conduction system pacing
right knee swelling

## 2025-04-25 LAB
ANION GAP SERPL CALC-SCNC: 17 MMOL/L — SIGNIFICANT CHANGE UP (ref 5–17)
APPEARANCE UR: CLEAR — SIGNIFICANT CHANGE UP
BACTERIA # UR AUTO: ABNORMAL /HPF
BASOPHILS # BLD AUTO: 0.04 K/UL — SIGNIFICANT CHANGE UP (ref 0–0.2)
BASOPHILS NFR BLD AUTO: 0.4 % — SIGNIFICANT CHANGE UP (ref 0–2)
BILIRUB UR-MCNC: NEGATIVE — SIGNIFICANT CHANGE UP
BUN SERPL-MCNC: 42.4 MG/DL — HIGH (ref 8–20)
CALCIUM SERPL-MCNC: 8.1 MG/DL — LOW (ref 8.4–10.5)
CAST: 0 /LPF — SIGNIFICANT CHANGE UP (ref 0–4)
CHLORIDE SERPL-SCNC: 93 MMOL/L — LOW (ref 96–108)
CHLORIDE UR-SCNC: 32 MMOL/L — SIGNIFICANT CHANGE UP
CO2 SERPL-SCNC: 19 MMOL/L — LOW (ref 22–29)
COLOR SPEC: YELLOW — SIGNIFICANT CHANGE UP
CREAT ?TM UR-MCNC: 50 MG/DL — SIGNIFICANT CHANGE UP
CREAT SERPL-MCNC: 1.94 MG/DL — HIGH (ref 0.5–1.3)
DIFF PNL FLD: NEGATIVE — SIGNIFICANT CHANGE UP
EGFR: 27 ML/MIN/1.73M2 — LOW
EGFR: 27 ML/MIN/1.73M2 — LOW
EOSINOPHIL # BLD AUTO: 0.3 K/UL — SIGNIFICANT CHANGE UP (ref 0–0.5)
EOSINOPHIL NFR BLD AUTO: 2.8 % — SIGNIFICANT CHANGE UP (ref 0–6)
GLUCOSE BLDC GLUCOMTR-MCNC: 170 MG/DL — HIGH (ref 70–99)
GLUCOSE BLDC GLUCOMTR-MCNC: 202 MG/DL — HIGH (ref 70–99)
GLUCOSE BLDC GLUCOMTR-MCNC: 215 MG/DL — HIGH (ref 70–99)
GLUCOSE BLDC GLUCOMTR-MCNC: 225 MG/DL — HIGH (ref 70–99)
GLUCOSE SERPL-MCNC: 240 MG/DL — HIGH (ref 70–99)
GLUCOSE UR QL: NEGATIVE MG/DL — SIGNIFICANT CHANGE UP
HCT VFR BLD CALC: 26.2 % — LOW (ref 34.5–45)
HGB BLD-MCNC: 8.5 G/DL — LOW (ref 11.5–15.5)
IMM GRANULOCYTES # BLD AUTO: 0.11 K/UL — HIGH (ref 0–0.07)
IMM GRANULOCYTES NFR BLD AUTO: 1 % — HIGH (ref 0–0.9)
KETONES UR-MCNC: NEGATIVE MG/DL — SIGNIFICANT CHANGE UP
LEUKOCYTE ESTERASE UR-ACNC: ABNORMAL
LYMPHOCYTES # BLD AUTO: 0.89 K/UL — LOW (ref 1–3.3)
LYMPHOCYTES NFR BLD AUTO: 8.4 % — LOW (ref 13–44)
MAGNESIUM SERPL-MCNC: 2.3 MG/DL — SIGNIFICANT CHANGE UP (ref 1.6–2.6)
MCHC RBC-ENTMCNC: 28.3 PG — SIGNIFICANT CHANGE UP (ref 27–34)
MCHC RBC-ENTMCNC: 32.4 G/DL — SIGNIFICANT CHANGE UP (ref 32–36)
MCV RBC AUTO: 87.3 FL — SIGNIFICANT CHANGE UP (ref 80–100)
MONOCYTES # BLD AUTO: 0.84 K/UL — SIGNIFICANT CHANGE UP (ref 0–0.9)
MONOCYTES NFR BLD AUTO: 7.9 % — SIGNIFICANT CHANGE UP (ref 2–14)
NEUTROPHILS # BLD AUTO: 8.43 K/UL — HIGH (ref 1.8–7.4)
NEUTROPHILS NFR BLD AUTO: 79.5 % — HIGH (ref 43–77)
NITRITE UR-MCNC: NEGATIVE — SIGNIFICANT CHANGE UP
NRBC # BLD AUTO: 0 K/UL — SIGNIFICANT CHANGE UP (ref 0–0)
NRBC # FLD: 0 K/UL — SIGNIFICANT CHANGE UP (ref 0–0)
NRBC BLD AUTO-RTO: 0 /100 WBCS — SIGNIFICANT CHANGE UP (ref 0–0)
OSMOLALITY UR: 283 MOSM/KG — LOW (ref 300–1000)
PH UR: 6 — SIGNIFICANT CHANGE UP (ref 5–8)
PLATELET # BLD AUTO: 378 K/UL — SIGNIFICANT CHANGE UP (ref 150–400)
PMV BLD: 9.5 FL — SIGNIFICANT CHANGE UP (ref 7–13)
POTASSIUM SERPL-MCNC: 4.8 MMOL/L — SIGNIFICANT CHANGE UP (ref 3.5–5.3)
POTASSIUM SERPL-SCNC: 4.8 MMOL/L — SIGNIFICANT CHANGE UP (ref 3.5–5.3)
POTASSIUM UR-SCNC: 52 MMOL/L — SIGNIFICANT CHANGE UP
PROT ?TM UR-MCNC: 13 MG/DL — HIGH (ref 0–12)
PROT UR-MCNC: NEGATIVE MG/DL — SIGNIFICANT CHANGE UP
PROT/CREAT UR-RTO: 0.3 RATIO — HIGH
RBC # BLD: 3 M/UL — LOW (ref 3.8–5.2)
RBC # FLD: 18.3 % — HIGH (ref 10.3–14.5)
RBC CASTS # UR COMP ASSIST: 6 /HPF — HIGH (ref 0–4)
SODIUM SERPL-SCNC: 128 MMOL/L — LOW (ref 135–145)
SODIUM UR-SCNC: <30 MMOL/L — SIGNIFICANT CHANGE UP
SP GR SPEC: 1.02 — SIGNIFICANT CHANGE UP (ref 1–1.03)
SQUAMOUS # UR AUTO: 4 /HPF — SIGNIFICANT CHANGE UP (ref 0–5)
UROBILINOGEN FLD QL: 0.2 MG/DL — SIGNIFICANT CHANGE UP (ref 0.2–1)
WBC # BLD: 10.61 K/UL — HIGH (ref 3.8–10.5)
WBC # FLD AUTO: 10.61 K/UL — HIGH (ref 3.8–10.5)
WBC UR QL: 35 /HPF — HIGH (ref 0–5)

## 2025-04-25 PROCEDURE — 99232 SBSQ HOSP IP/OBS MODERATE 35: CPT

## 2025-04-25 PROCEDURE — 93010 ELECTROCARDIOGRAM REPORT: CPT

## 2025-04-25 PROCEDURE — 71046 X-RAY EXAM CHEST 2 VIEWS: CPT | Mod: 26

## 2025-04-25 PROCEDURE — 99233 SBSQ HOSP IP/OBS HIGH 50: CPT

## 2025-04-25 PROCEDURE — 71045 X-RAY EXAM CHEST 1 VIEW: CPT | Mod: 26,XE

## 2025-04-25 RX ORDER — SODIUM CHLORIDE 3 G/100ML
150 INJECTION, SOLUTION INTRAVENOUS
Refills: 0 | Status: COMPLETED | OUTPATIENT
Start: 2025-04-25 | End: 2025-04-25

## 2025-04-25 RX ORDER — SODIUM BICARBONATE 1 MEQ/ML
1300 SYRINGE (ML) INTRAVENOUS THREE TIMES A DAY
Refills: 0 | Status: DISCONTINUED | OUTPATIENT
Start: 2025-04-25 | End: 2025-05-09

## 2025-04-25 RX ORDER — IRON SUCROSE 20 MG/ML
200 INJECTION, SOLUTION INTRAVENOUS EVERY 24 HOURS
Refills: 0 | Status: COMPLETED | OUTPATIENT
Start: 2025-04-25 | End: 2025-04-26

## 2025-04-25 RX ORDER — BUMETANIDE 1 MG/1
2 TABLET ORAL
Refills: 0 | Status: DISCONTINUED | OUTPATIENT
Start: 2025-04-25 | End: 2025-04-27

## 2025-04-25 RX ADMIN — INSULIN LISPRO 4: 100 INJECTION, SOLUTION INTRAVENOUS; SUBCUTANEOUS at 17:26

## 2025-04-25 RX ADMIN — CEPHALEXIN 250 MILLIGRAM(S): 250 CAPSULE ORAL at 06:41

## 2025-04-25 RX ADMIN — Medication 40 MILLIGRAM(S): at 05:52

## 2025-04-25 RX ADMIN — INSULIN GLARGINE-YFGN 5 UNIT(S): 100 INJECTION, SOLUTION SUBCUTANEOUS at 21:12

## 2025-04-25 RX ADMIN — SACUBITRIL AND VALSARTAN 1 TABLET(S): 6; 6 PELLET ORAL at 17:25

## 2025-04-25 RX ADMIN — TICAGRELOR 90 MILLIGRAM(S): 90 TABLET ORAL at 17:25

## 2025-04-25 RX ADMIN — Medication 1300 MILLIGRAM(S): at 21:12

## 2025-04-25 RX ADMIN — METHOCARBAMOL 500 MILLIGRAM(S): 500 TABLET, FILM COATED ORAL at 17:25

## 2025-04-25 RX ADMIN — CEPHALEXIN 250 MILLIGRAM(S): 250 CAPSULE ORAL at 17:24

## 2025-04-25 RX ADMIN — Medication 3 MILLIGRAM(S): at 21:11

## 2025-04-25 RX ADMIN — Medication 975 MILLIGRAM(S): at 13:03

## 2025-04-25 RX ADMIN — CARVEDILOL 6.25 MILLIGRAM(S): 3.12 TABLET, FILM COATED ORAL at 17:26

## 2025-04-25 RX ADMIN — Medication 1300 MILLIGRAM(S): at 14:36

## 2025-04-25 RX ADMIN — ATORVASTATIN CALCIUM 80 MILLIGRAM(S): 80 TABLET, FILM COATED ORAL at 21:12

## 2025-04-25 RX ADMIN — SACUBITRIL AND VALSARTAN 1 TABLET(S): 6; 6 PELLET ORAL at 05:52

## 2025-04-25 RX ADMIN — SERTRALINE 25 MILLIGRAM(S): 100 TABLET, FILM COATED ORAL at 13:03

## 2025-04-25 RX ADMIN — OXYCODONE HYDROCHLORIDE 5 MILLIGRAM(S): 30 TABLET ORAL at 05:59

## 2025-04-25 RX ADMIN — Medication 50 MILLIGRAM(S): at 21:12

## 2025-04-25 RX ADMIN — Medication 25 MILLIGRAM(S): at 05:52

## 2025-04-25 RX ADMIN — INSULIN LISPRO 4: 100 INJECTION, SOLUTION INTRAVENOUS; SUBCUTANEOUS at 13:03

## 2025-04-25 RX ADMIN — IRON SUCROSE 200 MILLIGRAM(S): 20 INJECTION, SOLUTION INTRAVENOUS at 14:40

## 2025-04-25 RX ADMIN — SODIUM CHLORIDE 300 MILLILITER(S): 3 INJECTION, SOLUTION INTRAVENOUS at 17:27

## 2025-04-25 RX ADMIN — CARVEDILOL 6.25 MILLIGRAM(S): 3.12 TABLET, FILM COATED ORAL at 05:52

## 2025-04-25 RX ADMIN — Medication 975 MILLIGRAM(S): at 05:51

## 2025-04-25 RX ADMIN — Medication 975 MILLIGRAM(S): at 21:11

## 2025-04-25 RX ADMIN — Medication 81 MILLIGRAM(S): at 13:03

## 2025-04-25 RX ADMIN — Medication 1000 MILLIGRAM(S): at 05:51

## 2025-04-25 RX ADMIN — METHOCARBAMOL 500 MILLIGRAM(S): 500 TABLET, FILM COATED ORAL at 05:52

## 2025-04-25 RX ADMIN — Medication 30 MILLILITER(S): at 13:05

## 2025-04-25 RX ADMIN — Medication 50 MILLIGRAM(S): at 13:03

## 2025-04-25 RX ADMIN — GABAPENTIN 400 MILLIGRAM(S): 400 CAPSULE ORAL at 13:05

## 2025-04-25 RX ADMIN — ISOSORBIDE MONONITRATE 60 MILLIGRAM(S): 60 TABLET, EXTENDED RELEASE ORAL at 13:03

## 2025-04-25 RX ADMIN — BUMETANIDE 2 MILLIGRAM(S): 1 TABLET ORAL at 14:36

## 2025-04-25 RX ADMIN — Medication 4 MILLIGRAM(S): at 08:21

## 2025-04-25 RX ADMIN — LIDOCAINE HYDROCHLORIDE 1 PATCH: 20 JELLY TOPICAL at 13:04

## 2025-04-25 RX ADMIN — Medication 50 MILLIGRAM(S): at 05:52

## 2025-04-25 RX ADMIN — TICAGRELOR 90 MILLIGRAM(S): 90 TABLET ORAL at 05:52

## 2025-04-25 RX ADMIN — INSULIN LISPRO 4: 100 INJECTION, SOLUTION INTRAVENOUS; SUBCUTANEOUS at 08:22

## 2025-04-25 NOTE — PROGRESS NOTE ADULT - REASON FOR ADMISSION
HF
HF exacerbation
HF
HF
Weight gain, poor response to oral diuretics
Decompensated HF
Acute decompensated heart failure

## 2025-04-25 NOTE — PROGRESS NOTE ADULT - ASSESSMENT
74yoF with a history of coronary artery disease, heart failure, chronic kidney disease, hypertension, diabetes, and anemia who was recently hospitalized for NSTEMI with PCI and bacteremia who presented with dyspnea on exertion and weight gain. Continues on Bumex drip as per cardiology additional diuretics with metolazone / Bumex IV given 4/21. s/p repeat heart cath 4/22 with no need for MEMS recalibration however plan for upgarde to CRT-P vs CRT-D device 4/24, s/p Cardiac MR. Diuresis resumed by HF team 4/25, continue to monitor.     #Acute on chronic systolic heart failure  - Recent echocardiogram noted ejection fraction of 31% with multiple segmental abnormalities  - Diuresis as per HF guidance, resumed IV bumex 2 mg BID 4/25, continue to monitor Mems data, given hypertonic saline in setting of hyponatremia (likely dilutional per nephrology)   s/p Metolazone 4/21, with Bumex IV push   - Strict in and out  - Daily weights  - On metoprolol, Imdur 60 mg daily, spironolactone, Entresto full dose, HDZN increased to 50 mg TID,   - Heart Failure on board recs appreciated, s/p PPM device upgrade 4/24 without issue, functioning well now adjusting diuresis   - Given patients recurrent UTI's / history of bacteremia, EP requesting ID input for potential abx's prior to device upgrade / implant, consulted, recs appreciated, no rec for prophylactic abx  -s/p repeat Heart cath 4/22, no recalibration of Mems needed   - pt with reproducible chest wall pain, started on pain regimen, remains intermittent with improvement with GI cocktail / pain medications, continue to monitor     #Right knee pain  - knee xray showing effusion, possible in setting of fall  - c/w current pain regimen, adjust as needed   - CT knee result noted  - ortho consulted, sp aspiration fluid neg for crystals/infections  ROM improving     #Fever  resolved   noted febrile episode 4/19 overnight to 100.9, repeat RVP, s/p Abxs with Zosyn, continue to monitor off Abx at this time   - CXR noted with ?Atelectasis vs edema vs infx  - sp Zosyn x 5 days   - swallow eval result noted, diet advanced   - Bcx NGTD    #Diarrhea  resolved   - possibly abx related  - GI pcr neg, PRN imodium started    #Coronary artery disease  - Continue on aspirin, ticagrelor, and atorvastatin  - Recent cardiac catheterization with PCI, Brillinta dosage increased to 90 BID from 60 as per HF  - initial trop 140 -> 118  - pt with recurrent chest pain, high up in chest, trop downtrended   - c/w maalox, pain improves with GI cocktail, as above consideration for repeat heart cath this admission     #Chronic kidney disease  - Renal function improved from prior values  - Continue to monitor while on diuresis    #Hyponatremia  improved   - Hypervolemic on presentation  - Bumetanide for diuresis, may adjust as above addition of metolazone / bumex IV push, may hold bumex drip and monitor ouptut     #Diabetes  - Insulin coverage  - Close monitoring of blood glucose levels  - Gabapentin for neuropathy  - need Glucometer on discharge     DVT ppx: ASA/Brilinta   Dispo: Pending HF optimization, when on stable PO diuretic will be ready for DC home expect 2-3 further days    PT eval - [facility], but family prefers home

## 2025-04-25 NOTE — PROGRESS NOTE ADULT - NS ATTEND AMEND GEN_ALL_CORE FT
Ms. Ko is a 73 y/o Tamazight speaking female with PMH of ICM/HFrEF (LVEF 35-40%), CAD s/p PCI, Mobitz II s/p PPM (MIRNA Dawn), HTN, HLD, PAD with chronic right LE wound, DM2, and CKD3, who presented to the ED on 3/19 with c/o CP, found to have NSTEMI. She underwent PCI to her RCA for ISR on 3/21/25. Over the next couple of days she developed fever, leukocytosis and worsening renal function. Her blood cultures are positive for ESBL E.coli and CT abdomen is concerning for left pyelonephritis. She was treated with antibiotics and was discharged on oral torsemide and she was instructed to check her cardiomems reading for further diuretic titration. She presented to the HF clinic on April 9th and was found to be very volume overloaded on exam with pro BNP > 18181, weight gain of 20 pounds and cardiomems PAD 32-33. She was admitted at [facility] for further management.     TTE: 3/21/25 LVEF 25-30%, LVEDD 4.6 cm, normal RV size and function, moderate MR, mild TR, PASP 58  TTE: LVEF 20 -25%, LVEDD 4.3 cm, mild RVE with mild RV dysfunction, moderate MR and moderate TR, PASP 57, AV dyssynchrony noted.   RHC: 4/22/25: RA 18, RV 63/18, PA 63/25/37, PCW 22, PA sat 37%, Jose CO/CI 2.5/1.5, Thermo CO 2.5, SVR 2800, PVR 4.5 WILLARD. No CardioMEMS recalibration needed.      Assessment:  Acute on chronic systolic heart failure  s/p CRT-D on 4/24/2025 for AV dyssynchrony and 100% RV pacing  Severe LV systolic function  Mild RV dysfunction  Ischemic cardiomyopathy  CAD s/p recent PCI to RCA ISR in 3/25 with LAD and LCx disease  Chronic kidney disease  Mobitz type II s/p MICRA  Hypertension  Hyperlipidemia  PAD  Diabetes mellitus type 2      Plan:   She underwent successful CRT-D placement yesterday.   Cardiomems shows PAD of 24.  It improved significantly after CRT-D placement.   Start IV bumex 2 mg BID. Give one dose of 2% saline 150 ml to augment diuresis.   BP is well controlled today and she is warm and well perfused on exam.   Continue coreg 6.25 mg BID,  Entresto  mg BID and spironolactone 25 mg daily.    Continue hydralazine to 50 mg TID and Imdur to 60 mg daily.   Not on SGLT2i given recent pyelonephritis.  Continue DAPT and statin for recent PCI.   We will plan to assess LV function in 4-6 weeks as an outpatient.     Further management of other co-morbidities per primary team.

## 2025-04-25 NOTE — PROGRESS NOTE ADULT - SUBJECTIVE AND OBJECTIVE BOX
ADVANCED HEART FAILURE - PROGRESS NOTE  [address] [place] [address]  Office Phone: [phone]/Fax: [phone]  Service/On Call Phone [phone]  _______________________________________________________________________________________________________    Subjective:  - Patient reports 1 episode of vomiting this AM. Currently endorses nausea and dizziness. Denies CP, palpitations, SOB, orthopnea, or PND. She has not tried ambulating or getting OOB today. She did not eat breakfast.    Medications:  acetaminophen     Tablet .. 975 milliGRAM(s) Oral every 8 hours  aluminum hydroxide/magnesium hydroxide/simethicone Suspension 30 milliLiter(s) Oral every 4 hours PRN  aspirin  chewable 81 milliGRAM(s) Oral daily  atorvastatin 80 milliGRAM(s) Oral at bedtime  benzocaine/menthol Lozenge 1 Lozenge Oral every 2 hours PRN  carvedilol 6.25 milliGRAM(s) Oral every 12 hours  cephalexin 250 milliGRAM(s) Oral every 12 hours  dextrose 5%. 1000 milliLiter(s) IV Continuous <Continuous>  dextrose 5%. 1000 milliLiter(s) IV Continuous <Continuous>  dextrose 50% Injectable 25 Gram(s) IV Push once  dextrose 50% Injectable 12.5 Gram(s) IV Push once  dextrose 50% Injectable 25 Gram(s) IV Push once  dextrose Oral Gel 15 Gram(s) Oral once PRN  gabapentin 400 milliGRAM(s) Oral daily  glucagon  Injectable 1 milliGRAM(s) IntraMuscular once  hydrALAZINE 50 milliGRAM(s) Oral three times a day  insulin glargine Injectable (LANTUS) 5 Unit(s) SubCutaneous at bedtime  insulin lispro (ADMELOG) corrective regimen sliding scale   SubCutaneous three times a day before meals  isosorbide   mononitrate ER Tablet (IMDUR) 60 milliGRAM(s) Oral daily  lidocaine   4% Patch 1 Patch Transdermal every 24 hours  loperamide 2 milliGRAM(s) Oral two times a day PRN  melatonin 3 milliGRAM(s) Oral at bedtime  methocarbamol 500 milliGRAM(s) Oral two times a day  ondansetron Injectable 4 milliGRAM(s) IV Push every 6 hours PRN  oxyCODONE    IR 5 milliGRAM(s) Oral every 8 hours PRN  pantoprazole    Tablet 40 milliGRAM(s) Oral before breakfast  sacubitril 97 mG/valsartan 103 mG 1 Tablet(s) Oral two times a day  sertraline 25 milliGRAM(s) Oral daily  spironolactone 25 milliGRAM(s) Oral daily  ticagrelor 90 milliGRAM(s) Oral every 12 hours      ICU Vital Signs Last 24 Hrs  T(C): 36.2, Max: 36.9 ( @ 00:03)  HR: 64 (60 - 69)  BP: 119/64 (101/54 - 139/66)  BP(mean): --  ABP: 160/53 (156/52 - 160/53)  ABP(mean): 87 (85 - 87)  RR: 19 (14 - 19)  SpO2: 100% (93% - 100%)    Weight in k.1 (25)  Weight in k.9 (25)      I&O's Summary Last 24 Hrs    IN: 480 mL / OUT: 600 mL / NET: -120 mL      Tele:  60s    Physical Exam:    General: No distress. Comfortable.  Neck: JVP 8-10 cm H2O.  Respiratory: Clear to auscultation bilaterally  CV: RRR. Normal S1 and S2. II/VI SM. Radial pulses normal.  Abdomen: Soft, non-distended, non-tender  Extremities: Warm, no edema  Neurology: Non-focal, alert and oriented times three.   Psych: Affect normal    Labs:    ( 25 @ 06:20 )               8.5    10.61 )--------( 378                  26.2     ( 25 @ 06:20 )     128  |  93  |  42.4  ---------------------<  240  4.8  |  19.0  |  1.94    Ca 8.1  Phos x   Mg 2.3    PTT/PT/INR - ( 25 @ 05:01 )  PTT: 31.9 sec / PT: 15.1 sec / INR: 1.34 ratio    ( 25 @ 16:15 )  TropHS 118   / CK x     / CKMB x      ( 25 @ 10:04 )  TropHS 140   / CK x     / CKMB x

## 2025-04-25 NOTE — PROGRESS NOTE ADULT - SUBJECTIVE AND OBJECTIVE BOX
[facility] Division of Hospital Medicine    Chief Complaint:      SUBJECTIVE / OVERNIGHT EVENTS:    Patient seen and examined at bedside, no acute events overnight, patient nauseous this AM after taking oxycodone, will hold at this time, provided zofran, will withold additional agent / reglan in setting of prolonged QT > 500.   discussed with HF team plan to resum diuretic today and give dose of hypertonic saline in setting of Na 128 (likely dilutional per nephrology), will continue to monitor.       MEDICATIONS  (STANDING):  acetaminophen     Tablet .. 975 milliGRAM(s) Oral every 8 hours  aspirin  chewable 81 milliGRAM(s) Oral daily  atorvastatin 80 milliGRAM(s) Oral at bedtime  buMETAnide Injectable 2 milliGRAM(s) IV Push two times a day  carvedilol 6.25 milliGRAM(s) Oral every 12 hours  cephalexin 250 milliGRAM(s) Oral every 12 hours  dextrose 5%. 1000 milliLiter(s) (100 mL/Hr) IV Continuous <Continuous>  dextrose 5%. 1000 milliLiter(s) (50 mL/Hr) IV Continuous <Continuous>  dextrose 50% Injectable 25 Gram(s) IV Push once  dextrose 50% Injectable 12.5 Gram(s) IV Push once  dextrose 50% Injectable 25 Gram(s) IV Push once  gabapentin 400 milliGRAM(s) Oral daily  glucagon  Injectable 1 milliGRAM(s) IntraMuscular once  hydrALAZINE 50 milliGRAM(s) Oral three times a day  insulin glargine Injectable (LANTUS) 5 Unit(s) SubCutaneous at bedtime  insulin lispro (ADMELOG) corrective regimen sliding scale   SubCutaneous three times a day before meals  iron sucrose Injectable 200 milliGRAM(s) IV Push every 24 hours  isosorbide   mononitrate ER Tablet (IMDUR) 60 milliGRAM(s) Oral daily  lidocaine   4% Patch 1 Patch Transdermal every 24 hours  melatonin 3 milliGRAM(s) Oral at bedtime  methocarbamol 500 milliGRAM(s) Oral two times a day  pantoprazole    Tablet 40 milliGRAM(s) Oral before breakfast  sacubitril 97 mG/valsartan 103 mG 1 Tablet(s) Oral two times a day  sodium bicarbonate 1300 milliGRAM(s) Oral three times a day  spironolactone 25 milliGRAM(s) Oral daily  ticagrelor 90 milliGRAM(s) Oral every 12 hours    MEDICATIONS  (PRN):  aluminum hydroxide/magnesium hydroxide/simethicone Suspension 30 milliLiter(s) Oral every 4 hours PRN Dyspepsia  benzocaine/menthol Lozenge 1 Lozenge Oral every 2 hours PRN Sore Throat  dextrose Oral Gel 15 Gram(s) Oral once PRN Blood Glucose LESS THAN 70 milliGRAM(s)/deciliter  loperamide 2 milliGRAM(s) Oral two times a day PRN Diarrhea  ondansetron Injectable 4 milliGRAM(s) IV Push every 6 hours PRN Nausea and/or Vomiting        I&O's Summary    24 Apr 2025 07:01  -  25 Apr 2025 07:00  --------------------------------------------------------  IN: 480 mL / OUT: 600 mL / NET: -120 mL    25 Apr 2025 07:01  -  25 Apr 2025 18:12  --------------------------------------------------------  IN: 150 mL / OUT: 0 mL / NET: 150 mL        PHYSICAL EXAM:  Vital Signs Last 24 Hrs  T(C): 36.7 (25 Apr 2025 15:35), Max: 36.9 (25 Apr 2025 00:03)  T(F): 98 (25 Apr 2025 15:35), Max: 98.4 (25 Apr 2025 00:03)  HR: 61 (25 Apr 2025 15:35) (60 - 64)  BP: 116/65 (25 Apr 2025 15:35) (101/54 - 136/72)  BP(mean): --  RR: 17 (25 Apr 2025 15:35) (17 - 19)  SpO2: 98% (25 Apr 2025 15:35) (93% - 100%)    Parameters below as of 25 Apr 2025 15:35  Patient On (Oxygen Delivery Method): room air      CONSTITUTIONAL: NAD, on NC  RESPIRATORY: Normal respiratory effort; lungs are clear to auscultation bilaterally  CARDIOVASCULAR: Regular rate and rhythm   ABDOMEN: Nontender to palpation, normoactive bowel sounds  MSK: ROM wnl in extremities x4, R. knee improved    LABS:                        8.5    10.61 )-----------( 378      ( 25 Apr 2025 06:20 )             26.2     04-25    128[L]  |  93[L]  |  42.4[H]  ----------------------------<  240[H]  4.8   |  19.0[L]  |  1.94[H]    Ca    8.1[L]      25 Apr 2025 06:20  Mg     2.3     04-25      PT/INR - ( 24 Apr 2025 05:01 )   PT: 15.1 sec;   INR: 1.34 ratio         PTT - ( 24 Apr 2025 05:01 )  PTT:31.9 sec      Urinalysis Basic - ( 25 Apr 2025 06:20 )    Color: x / Appearance: x / SG: x / pH: x  Gluc: 240 mg/dL / Ketone: x  / Bili: x / Urobili: x   Blood: x / Protein: x / Nitrite: x   Leuk Esterase: x / RBC: x / WBC x   Sq Epi: x / Non Sq Epi: x / Bacteria: x        CAPILLARY BLOOD GLUCOSE      POCT Blood Glucose.: 202 mg/dL (25 Apr 2025 17:00)  POCT Blood Glucose.: 215 mg/dL (25 Apr 2025 11:55)  POCT Blood Glucose.: 225 mg/dL (25 Apr 2025 07:57)  POCT Blood Glucose.: 266 mg/dL (24 Apr 2025 20:59)        RADIOLOGY & ADDITIONAL TESTS:  Results Reviewed:   Imaging Personally Reviewed:  Electrocardiogram Personally Reviewed:

## 2025-04-25 NOTE — PROGRESS NOTE ADULT - SUBJECTIVE AND OBJECTIVE BOX
NEPHROLOGY INTERVAL HPI/OVERNIGHT EVENTS:    No new events after pacer  yesterday.    MEDICATIONS  (STANDING):  acetaminophen     Tablet .. 975 milliGRAM(s) Oral every 8 hours  aspirin  chewable 81 milliGRAM(s) Oral daily  atorvastatin 80 milliGRAM(s) Oral at bedtime  carvedilol 6.25 milliGRAM(s) Oral every 12 hours  dextrose 5%. 1000 milliLiter(s) (100 mL/Hr) IV Continuous <Continuous>  dextrose 5%. 1000 milliLiter(s) (50 mL/Hr) IV Continuous <Continuous>  dextrose 50% Injectable 25 Gram(s) IV Push once  dextrose 50% Injectable 12.5 Gram(s) IV Push once  dextrose 50% Injectable 25 Gram(s) IV Push once  gabapentin 400 milliGRAM(s) Oral daily  glucagon  Injectable 1 milliGRAM(s) IntraMuscular once  hydrALAZINE 50 milliGRAM(s) Oral three times a day  insulin glargine Injectable (LANTUS) 5 Unit(s) SubCutaneous at bedtime  insulin lispro (ADMELOG) corrective regimen sliding scale   SubCutaneous three times a day before meals  isosorbide   mononitrate ER Tablet (IMDUR) 60 milliGRAM(s) Oral daily  lidocaine   4% Patch 1 Patch Transdermal every 24 hours  melatonin 3 milliGRAM(s) Oral at bedtime  methocarbamol 500 milliGRAM(s) Oral two times a day  pantoprazole    Tablet 40 milliGRAM(s) Oral before breakfast  sacubitril 97 mG/valsartan 103 mG 1 Tablet(s) Oral two times a day  sertraline 25 milliGRAM(s) Oral daily  spironolactone 25 milliGRAM(s) Oral daily  ticagrelor 90 milliGRAM(s) Oral every 12 hours    MEDICATIONS  (PRN):  aluminum hydroxide/magnesium hydroxide/simethicone Suspension 30 milliLiter(s) Oral every 4 hours PRN Dyspepsia  dextrose Oral Gel 15 Gram(s) Oral once PRN Blood Glucose LESS THAN 70 milliGRAM(s)/deciliter  loperamide 2 milliGRAM(s) Oral two times a day PRN Diarrhea      Allergies    No Known Allergies        Vital Signs Last 24 Hrs  T(C): 36.7 (25 Apr 2025 13:12), Max: 36.9 (25 Apr 2025 00:03)  T(F): 98 (25 Apr 2025 13:12), Max: 98.4 (25 Apr 2025 00:03)  HR: 60 (25 Apr 2025 13:12) (60 - 69)  BP: 109/64 (25 Apr 2025 13:12) (101/54 - 139/66)  BP(mean): --  RR: 18 (25 Apr 2025 13:12) (14 - 19)  SpO2: 99% (25 Apr 2025 13:12) (93% - 100%)    Parameters below as of 25 Apr 2025 13:12  Patient On (Oxygen Delivery Method): room air      T(C): 36.7 (24 Apr 2025 07:10), Max: 36.7 (24 Apr 2025 07:10)  T(F): 98 (24 Apr 2025 07:10), Max: 98 (24 Apr 2025 07:10)  HR: 60 (24 Apr 2025 07:10) (51 - 60)  BP: 124/69 (24 Apr 2025 07:10) (110/57 - 144/72)  BP(mean): --  RR: 18 (24 Apr 2025 07:10) (17 - 18)  SpO2: 94% (24 Apr 2025 07:10) (94% - 100%)    Parameters below as of 24 Apr 2025 07:10  Patient On (Oxygen Delivery Method): room air      PHYSICAL EXAM:      GENERAL: mild pain  at pacer  site  HEAD: NCAT  EYES: open  NECK: veins  wnl  NERVOUS SYSTEM:  Alert & Oriented supine in bed, family member at bedside  CHEST/LUNG: no 02, no  wheezes  HEART: Regular rate and rhythm; Left new pacer  site  clean  ABDOMEN: Soft, Nontender, +BS  EXTREMITIES: legs  same   : Neg      LABS:    04-25    128[L]  |  93[L]  |  42.4[H]  ----------------------------<  240[H]  4.8   |  19.0[L]  |  1.94[H]    Ca    8.1[L]      25 Apr 2025 06:20  Mg     2.3     04-25                            8.9    10.00 )-----------( 393      ( 24 Apr 2025 05:01 )             27.7     04-24    130[L]  |  93[L]  |  42.3[H]  ----------------------------<  196[H]  4.8   |  19.0[L]  |  1.96[H]    Ca    8.3[L]      24 Apr 2025 05:01      PT/INR - ( 24 Apr 2025 05:01 )   PT: 15.1 sec;   INR: 1.34 ratio         PTT - ( 24 Apr 2025 05:01 )  PTT:31.9 sec  Urinalysis Basic - ( 24 Apr 2025 05:01 )    Color: x / Appearance: x / SG: x / pH: x  Gluc: 196 mg/dL / Ketone: x  / Bili: x / Urobili: x   Blood: x / Protein: x / Nitrite: x   Leuk Esterase: x / RBC: x / WBC x   Sq Epi: x / Non Sq Epi: x / Bacteria: x              RADIOLOGY & ADDITIONAL TESTS:

## 2025-04-25 NOTE — PROGRESS NOTE ADULT - SUBJECTIVE AND OBJECTIVE BOX
[employer]  ID # [id number]    Pt doing well POD #1 s/p uncomplicated CRT-D implant (Left bundle lead, RV ICD lead). Stable overnight w/o event. Denies any complaints at time of assessment.       Exam:   VSS, NAD, A&O x 3  Incision: Aquacell dressing C/D/I; no bleeding, hematoma, erythema, exudate or edema; distal pulses intact  Card: S1/S2, RRR, no m/g/r  Resp: lungs CTA b/l  Abd: S/NT/ND  Ext: no edema, distal pulses intact      Device interrogation: Pending      Tele: A-sensed, V-paced rhythm.       CXR: lead locations grossly stable & appropriate. Official read pending.       Labs:                         8.5    10.61 )-----------( 378      ( 25 Apr 2025 06:20 )             26.2     04-25    128[L]  |  93[L]  |  42.4[H]  ----------------------------<  240[H]  4.8   |  19.0[L]  |  1.94[H]    Ca    8.1[L]      25 Apr 2025 06:20  Mg     2.3     04-25          Assessment:   75 y/o female  with ICM, HFrEF (LVEF 25-30%), CAD s/p multiple PCI in past, Mobitz II AV block s/p MDT Micra AV leadless pacemaker insertion (4/2020), HTN, HLD, PAD with chronic right LE wound (hx of cellulitis), DM2, CKD3, and blood cultures positive for E. Coli/ESBL (3/2025 - pylenephritis/urosepsis) who is admitted with acute on chronic HFrEF exacerbation and BRADY.    Pt is now POD #1 status post uncomplicated CRT-D implant (Left bundle lead, RV ICD lead). Pt reports no complaints at time of assessment this AM.       Plan:   Keflex 250 mg BID x 5 days  Aquacell dressing overlying ICD incision site to be removed in 7 days  Pt instructed as to ROM of affected arm - no lifting/pushing/pulling >10 lbs & shoulder ROM limited to 90deg & below x 4 weeks.   Pt instructed as to incision care and f/up - written instructions included in d/c documents.  Outpt f/up scheduled.    Device interrogation pending   AM EKG pending  Outpatient follow up with Dr. Santana in 1 - 2 weeks    [employer]  ID # [id number]    Pt doing well POD #1 s/p uncomplicated CRT-D implant (Left bundle lead, RV ICD lead). Stable overnight w/o event. Denies any complaints at time of assessment.       Exam:   VSS, NAD, A&O x 3  Incision: Aquacell dressing C/D/I; no bleeding, hematoma, erythema, exudate or edema; distal pulses intact  Card: S1/S2, RRR, no m/g/r  Resp: lungs CTA b/l  Abd: S/NT/ND  Ext: no edema, distal pulses intact      Device interrogation: Pending      Tele: A-sensed, V-paced rhythm.       CXR: lead locations grossly stable & appropriate. Official read pending.       Labs:                         8.5    10.61 )-----------( 378      ( 25 Apr 2025 06:20 )             26.2     04-25    128[L]  |  93[L]  |  42.4[H]  ----------------------------<  240[H]  4.8   |  19.0[L]  |  1.94[H]    Ca    8.1[L]      25 Apr 2025 06:20  Mg     2.3     04-25          Assessment:   75 y/o female  with ICM, HFrEF (LVEF 25-30%), CAD s/p multiple PCI in past, Mobitz II AV block s/p MDT Micra AV leadless pacemaker insertion (4/2020), HTN, HLD, PAD with chronic right LE wound (hx of cellulitis), DM2, CKD3, and blood cultures positive for E. Coli/ESBL (3/2025 - pylenephritis/urosepsis) who is admitted with acute on chronic HFrEF exacerbation and BRADY.    Pt is now POD #1 status post uncomplicated CRT-D implant (Left bundle lead, RV ICD lead). Pt reports no complaints at time of assessment this AM.       Plan:   Keflex 250 mg BID x 5 days  Aquacell dressing overlying ICD incision site to be removed in 7 days  Pt instructed as to ROM of affected arm - no lifting/pushing/pulling >10 lbs & shoulder ROM limited to 90deg & below x 4 weeks.   Pt instructed as to incision care and f/up - written instructions included in d/c documents.  Outpt f/up scheduled.    Device interrogation pending   AM EKG pending  Outpatient follow up with Dr. Santana in 1 - 2 weeks       Addendum:  Device interrogation revealed normal functioning device. No further EP intervention required at this time.

## 2025-04-25 NOTE — PROGRESS NOTE ADULT - ASSESSMENT
Gen Cards: Dr. Allen  HF: Dr. Polanco  IC: Dr. Bajwa    73 y/o female with PMH of ICM/HFrEF (LVEF 35-40%), CAD s/p PCI, Mobitz II s/p PPM (Nereyda, MDT), HTN, HLD, PAD with chronic right LE wound, DM2, and CKD3, who was BIBA from CHF clinic due to hypervolemia, weight gain of ~20 lbs in the past 9 days despite escalation of PO diuretics.      She was recently admitted on 3/19/25 with c/o CP and found to have NSTEMI. She underwent LHC on 3/21 which showed mid-distal LAD severe stenosis, OM1 and OM2 severe stenosis, mid ISR 50%, distal ISR 99%, s/p PCI with 1 ZEINAB to RCA.  On 3/22 she had a fever with leukocytosis and her blood cultures were positive for E. Coli/ESBL. Her CT A/P showed left pyelonephritis. She also had an BRADY for which her Entresto was held. She was discharged home 3/31 and her CardioMEMS was not at goal and had been rising. She was advised to increase Torsemide 40 mg daily to twice daily on 4/4. Despite this, she has continued to gain weight and reports poor UOP response.     Admission labwork: Na 125, K 4.6, BUN 37.7, Cr 1.94, eGFR 27, Mg 2.3, proBNP 30523!  CXR: increased interstitial markings bilaterally, cardiomegaly.   On 4/10 she developed a fever, tmax 100.4F. RVP negative, empirically started on IV Zosyn.  4/13 s/p right knee fluid aspiration (s/p recent trauma/fall).  4/15 initiated on continuous Bumex infusion for inadequate response to intermittent dosing.  4/21: Received 1 dose of metolazone 5 mg and Bumex gtt switched to 4 mg IV BID.  4/24: s/p CRT-D    Prior Cardiac Testing:  RHC 4/22/25: RA 18, RV 63/18, PA 63/25/37, PCW 22, PA sat 37%, Jose CO/CI 2.5/1.5, Thermo CO 2.5, SVR 2800 dsc, PVR 4.5 WILLARD. No CardioMEMS recalibration needed.     TTE 4/21/25: LVEF 20-25%, LVIDd 4.3 cm, LVOT VTI 12.6 cm, grade 2 DD, mild RVE with mild RV dysfunction, mild-mod AS, mod MR/TR, mild AK, est PASP 57 mmHg, est RAP 8 mmHg.    TTE 3/21/25: LVEF 25-30%, grade II DD, normal RV size/function, severe LAE, moderate MR, mild TR, PASP 58 mmHg ( RAP 8 mmHg), no evidence of LV thrombus, multiple segmental abnormalities/regional WMA.    CardioMEMS PAD (goal 14):  4/25: 24  4/22: 29-30  4/20: 31  4/18: 27  4/17: 27 (in chair ~30 degrees).  4/14: 30-33  4/11: 32  4/9: 32  3/27: 26  3/24: 23  3/21: 22

## 2025-04-25 NOTE — PROGRESS NOTE ADULT - ASSESSMENT
A) Anemia with low  t-sat, CRF, Cardiomyopathy with new  pacer; AGMA; secondary  hyperparathyroid. Dilutional  hyponatremia.    P) IV  iron; sodium  bicarb. po, fluid  restriction, zoloft held; urine studies for ketone, protein, lytes; serum acetone.

## 2025-04-25 NOTE — PROGRESS NOTE ADULT - PROBLEM SELECTOR PLAN 1
- ICM, most recent TTE on 4/21/25 showed further decline of LVEF to 20-25%  - Clinically appears euvolemic on exam w/warm extremities.  - Serum proBNP >68K on admission. Repeat pro-BNP > 70k on 4/21.  - Diuretics: Will continue to hold diuretics and dose according to CardioMEMS (last dose of Bumex on 4/22 PM).  - Maintain K>4, Mg >2.  - GDMT: Continue Coreg 6.25 mg BID, Entresto  mg BID and spironolactone 25 mg daily. Can wean hydralazine to 25 mg TID and increase Coreg if needed. Continue Imdur 60 mg daily for anginal pain. No Farxiga 2/2 h/o pyelonephritis.   - Device: h/o Micra PPM with chronic . She is now s/p CRT-D on 4/24.  - Low Na, 1.5L FR diet recommended  - Please document strict I&Os and daily standing weight. - ICM, most recent TTE on 4/21/25 showed further decline of LVEF to 20-25%  - Clinically appears mildly hypervolemic on exam w/warm extremities.  - Serum proBNP >68K on admission. Repeat pro-BNP > 70k on 4/21.  - Worsening hyponatremia.  - Diuretics: Will resume Bumex 2 mg IV BID and give 1 dose of hypertonic saline 2% bolus (150 mL over 30 minutes).  - Maintain K>4, Mg >2.  - GDMT: Continue Coreg 6.25 mg BID, Entresto  mg BID and spironolactone 25 mg daily. Can wean hydralazine to 25 mg TID and increase Coreg if needed. Continue Imdur 60 mg daily for anginal pain. No Farxiga 2/2 h/o pyelonephritis.   - Device: h/o Micra PPM with chronic . She is now s/p CRT-D on 4/24.  - Low Na, 1.5L FR diet recommended  - Please document strict I&Os and daily standing weight.

## 2025-04-26 LAB
ALBUMIN SERPL ELPH-MCNC: 2.8 G/DL — LOW (ref 3.3–5.2)
ALP SERPL-CCNC: 118 U/L — SIGNIFICANT CHANGE UP (ref 40–120)
ALT FLD-CCNC: <5 U/L — SIGNIFICANT CHANGE UP
ANION GAP SERPL CALC-SCNC: 14 MMOL/L — SIGNIFICANT CHANGE UP (ref 5–17)
AST SERPL-CCNC: 14 U/L — SIGNIFICANT CHANGE UP
B-OH-BUTYR SERPL-SCNC: 0.1 MMOL/L — SIGNIFICANT CHANGE UP
BILIRUB SERPL-MCNC: 0.3 MG/DL — LOW (ref 0.4–2)
BLD GP AB SCN SERPL QL: SIGNIFICANT CHANGE UP
BUN SERPL-MCNC: 45.5 MG/DL — HIGH (ref 8–20)
CALCIUM SERPL-MCNC: 7.7 MG/DL — LOW (ref 8.4–10.5)
CHLORIDE SERPL-SCNC: 91 MMOL/L — LOW (ref 96–108)
CO2 SERPL-SCNC: 22 MMOL/L — SIGNIFICANT CHANGE UP (ref 22–29)
CREAT SERPL-MCNC: 2.35 MG/DL — HIGH (ref 0.5–1.3)
CULTURE RESULTS: SIGNIFICANT CHANGE UP
EGFR: 21 ML/MIN/1.73M2 — LOW
EGFR: 21 ML/MIN/1.73M2 — LOW
GLUCOSE BLDC GLUCOMTR-MCNC: 138 MG/DL — HIGH (ref 70–99)
GLUCOSE BLDC GLUCOMTR-MCNC: 167 MG/DL — HIGH (ref 70–99)
GLUCOSE BLDC GLUCOMTR-MCNC: 171 MG/DL — HIGH (ref 70–99)
GLUCOSE BLDC GLUCOMTR-MCNC: 224 MG/DL — HIGH (ref 70–99)
GLUCOSE SERPL-MCNC: 157 MG/DL — HIGH (ref 70–99)
HCT VFR BLD CALC: 25.3 % — LOW (ref 34.5–45)
HGB BLD-MCNC: 8.2 G/DL — LOW (ref 11.5–15.5)
MAGNESIUM SERPL-MCNC: 2.4 MG/DL — SIGNIFICANT CHANGE UP (ref 1.6–2.6)
MCHC RBC-ENTMCNC: 28.6 PG — SIGNIFICANT CHANGE UP (ref 27–34)
MCHC RBC-ENTMCNC: 32.4 G/DL — SIGNIFICANT CHANGE UP (ref 32–36)
MCV RBC AUTO: 88.2 FL — SIGNIFICANT CHANGE UP (ref 80–100)
NRBC # BLD AUTO: 0 K/UL — SIGNIFICANT CHANGE UP (ref 0–0)
NRBC # FLD: 0 K/UL — SIGNIFICANT CHANGE UP (ref 0–0)
NRBC BLD AUTO-RTO: 0 /100 WBCS — SIGNIFICANT CHANGE UP (ref 0–0)
PHOSPHATE SERPL-MCNC: 3.9 MG/DL — SIGNIFICANT CHANGE UP (ref 2.4–4.7)
PLATELET # BLD AUTO: 347 K/UL — SIGNIFICANT CHANGE UP (ref 150–400)
PMV BLD: 9.5 FL — SIGNIFICANT CHANGE UP (ref 7–13)
POTASSIUM SERPL-MCNC: 4.8 MMOL/L — SIGNIFICANT CHANGE UP (ref 3.5–5.3)
POTASSIUM SERPL-SCNC: 4.8 MMOL/L — SIGNIFICANT CHANGE UP (ref 3.5–5.3)
PROT SERPL-MCNC: 6 G/DL — LOW (ref 6.6–8.7)
RBC # BLD: 2.87 M/UL — LOW (ref 3.8–5.2)
RBC # FLD: 18.6 % — HIGH (ref 10.3–14.5)
SODIUM SERPL-SCNC: 127 MMOL/L — LOW (ref 135–145)
SPECIMEN SOURCE: SIGNIFICANT CHANGE UP
WBC # BLD: 10.07 K/UL — SIGNIFICANT CHANGE UP (ref 3.8–10.5)
WBC # FLD AUTO: 10.07 K/UL — SIGNIFICANT CHANGE UP (ref 3.8–10.5)

## 2025-04-26 PROCEDURE — 99233 SBSQ HOSP IP/OBS HIGH 50: CPT

## 2025-04-26 PROCEDURE — 99232 SBSQ HOSP IP/OBS MODERATE 35: CPT

## 2025-04-26 RX ORDER — SODIUM CHLORIDE 3 G/100ML
150 INJECTION, SOLUTION INTRAVENOUS
Refills: 0 | Status: DISCONTINUED | OUTPATIENT
Start: 2025-04-26 | End: 2025-04-26

## 2025-04-26 RX ORDER — SODIUM CHLORIDE 3 G/100ML
150 INJECTION, SOLUTION INTRAVENOUS
Refills: 0 | Status: COMPLETED | OUTPATIENT
Start: 2025-04-26 | End: 2025-04-26

## 2025-04-26 RX ADMIN — INSULIN GLARGINE-YFGN 5 UNIT(S): 100 INJECTION, SOLUTION SUBCUTANEOUS at 21:58

## 2025-04-26 RX ADMIN — TICAGRELOR 90 MILLIGRAM(S): 90 TABLET ORAL at 05:33

## 2025-04-26 RX ADMIN — Medication 1300 MILLIGRAM(S): at 14:06

## 2025-04-26 RX ADMIN — CARVEDILOL 6.25 MILLIGRAM(S): 3.12 TABLET, FILM COATED ORAL at 17:42

## 2025-04-26 RX ADMIN — Medication 81 MILLIGRAM(S): at 12:32

## 2025-04-26 RX ADMIN — Medication 250 MILLILITER(S): at 00:44

## 2025-04-26 RX ADMIN — Medication 3 MILLIGRAM(S): at 21:59

## 2025-04-26 RX ADMIN — CEPHALEXIN 250 MILLIGRAM(S): 250 CAPSULE ORAL at 05:33

## 2025-04-26 RX ADMIN — TICAGRELOR 90 MILLIGRAM(S): 90 TABLET ORAL at 17:42

## 2025-04-26 RX ADMIN — GABAPENTIN 400 MILLIGRAM(S): 400 CAPSULE ORAL at 12:32

## 2025-04-26 RX ADMIN — Medication 1300 MILLIGRAM(S): at 21:58

## 2025-04-26 RX ADMIN — METHOCARBAMOL 500 MILLIGRAM(S): 500 TABLET, FILM COATED ORAL at 17:42

## 2025-04-26 RX ADMIN — Medication 975 MILLIGRAM(S): at 13:38

## 2025-04-26 RX ADMIN — SODIUM CHLORIDE 150 MILLILITER(S): 3 INJECTION, SOLUTION INTRAVENOUS at 14:06

## 2025-04-26 RX ADMIN — Medication 975 MILLIGRAM(S): at 21:58

## 2025-04-26 RX ADMIN — Medication 40 MILLIGRAM(S): at 05:32

## 2025-04-26 RX ADMIN — Medication 25 MILLIGRAM(S): at 21:58

## 2025-04-26 RX ADMIN — Medication 975 MILLIGRAM(S): at 05:33

## 2025-04-26 RX ADMIN — BUMETANIDE 2 MILLIGRAM(S): 1 TABLET ORAL at 12:32

## 2025-04-26 RX ADMIN — INSULIN LISPRO 2: 100 INJECTION, SOLUTION INTRAVENOUS; SUBCUTANEOUS at 08:26

## 2025-04-26 RX ADMIN — Medication 1300 MILLIGRAM(S): at 05:32

## 2025-04-26 RX ADMIN — Medication 975 MILLIGRAM(S): at 13:58

## 2025-04-26 RX ADMIN — LIDOCAINE HYDROCHLORIDE 1 PATCH: 20 JELLY TOPICAL at 12:41

## 2025-04-26 RX ADMIN — ISOSORBIDE MONONITRATE 60 MILLIGRAM(S): 60 TABLET, EXTENDED RELEASE ORAL at 12:32

## 2025-04-26 RX ADMIN — Medication 50 MILLIGRAM(S): at 13:38

## 2025-04-26 RX ADMIN — BUMETANIDE 2 MILLIGRAM(S): 1 TABLET ORAL at 17:43

## 2025-04-26 RX ADMIN — CEPHALEXIN 250 MILLIGRAM(S): 250 CAPSULE ORAL at 17:42

## 2025-04-26 RX ADMIN — METHOCARBAMOL 500 MILLIGRAM(S): 500 TABLET, FILM COATED ORAL at 05:33

## 2025-04-26 RX ADMIN — INSULIN LISPRO 4: 100 INJECTION, SOLUTION INTRAVENOUS; SUBCUTANEOUS at 17:42

## 2025-04-26 RX ADMIN — ATORVASTATIN CALCIUM 80 MILLIGRAM(S): 80 TABLET, FILM COATED ORAL at 21:59

## 2025-04-26 RX ADMIN — LIDOCAINE HYDROCHLORIDE 1 PATCH: 20 JELLY TOPICAL at 18:57

## 2025-04-26 RX ADMIN — IRON SUCROSE 200 MILLIGRAM(S): 20 INJECTION, SOLUTION INTRAVENOUS at 14:06

## 2025-04-26 RX ADMIN — INSULIN LISPRO 2: 100 INJECTION, SOLUTION INTRAVENOUS; SUBCUTANEOUS at 12:32

## 2025-04-26 RX ADMIN — SACUBITRIL AND VALSARTAN 1 TABLET(S): 6; 6 PELLET ORAL at 17:42

## 2025-04-26 NOTE — PROGRESS NOTE ADULT - NS ATTEND AMEND GEN_ALL_CORE FT
Ms. Ko is a 75 y/o Czech speaking female with PMH of ICM/HFrEF (LVEF 35-40%), CAD s/p PCI, Mobitz II s/p PPM (MIRNA Dawn), HTN, HLD, PAD with chronic right LE wound, DM2, and CKD3, who presented to the ED on 3/19 with c/o CP, found to have NSTEMI. She underwent PCI to her RCA for ISR on 3/21/25. Over the next couple of days she developed fever, leukocytosis and worsening renal function. Her blood cultures are positive for ESBL E.coli and CT abdomen is concerning for left pyelonephritis. She was treated with antibiotics and was discharged on oral torsemide and she was instructed to check her cardiomems reading for further diuretic titration. She presented to the HF clinic on April 9th and was found to be very volume overloaded on exam with pro BNP > 91594, weight gain of 20 pounds and cardiomems PAD 32-33. She was admitted at [facility] for further management.     TTE: 3/21/25 LVEF 25-30%, LVEDD 4.6 cm, normal RV size and function, moderate MR, mild TR, PASP 58  TTE: LVEF 20 -25%, LVEDD 4.3 cm, mild RVE with mild RV dysfunction, moderate MR and moderate TR, PASP 57, AV dyssynchrony noted.   RHC: 4/22/25: RA 18, RV 63/18, PA 63/25/37, PCW 22, PA sat 37%, Jose CO/CI 2.5/1.5, Thermo CO 2.5, SVR 2800, PVR 4.5 WILLARD. No CardioMEMS recalibration needed.    Assessment:  Acute on chronic systolic heart failure  S/p CRT-D on 4/24/2025 for AV dyssynchrony and 100% RV pacing  Severe LV systolic function  Mild RV dysfunction  Ischemic cardiomyopathy  CAD s/p recent PCI to RCA ISR in 3/25 with LAD and LCx disease  Chronic kidney disease  Mobitz type II s/p MICRA  Hypertension  Hyperlipidemia  PAD  Diabetes mellitus type 2      Plan:   PAD 24 yesterday (improved after CRT-D placement). Remains volume overloaded on exam with warm extremities, however with worsening BRADY and hyponatremia. Repeat 2% saline today. Continue bumex 2mg IV bid. Monitor I's/O's, daily weights.   Recheck cardiomems in AM   Continue coreg 6.25 mg BID,  Entresto  mg BID and spironolactone 25 mg daily.    Continue hydralazine to 50 mg TID and Imdur to 60 mg daily.   Not on SGLT2i given recent pyelonephritis.  Continue DAPT and statin for recent PCI.   We will plan to assess LV function in 4-6 weeks as an outpatient.

## 2025-04-26 NOTE — PROGRESS NOTE ADULT - SUBJECTIVE AND OBJECTIVE BOX
[facility] Division of Hospital Medicine         SUBJECTIVE / OVERNIGHT EVENTS:    Patient denies chest pain, SOB, abd pain, N/V, fever, chills, dysuria or any other complaints. All remainder ROS negative.     MEDICATIONS  (STANDING):  acetaminophen     Tablet .. 975 milliGRAM(s) Oral every 8 hours  aspirin  chewable 81 milliGRAM(s) Oral daily  atorvastatin 80 milliGRAM(s) Oral at bedtime  buMETAnide Injectable 2 milliGRAM(s) IV Push two times a day  carvedilol 6.25 milliGRAM(s) Oral every 12 hours  cephalexin 250 milliGRAM(s) Oral every 12 hours  dextrose 5%. 1000 milliLiter(s) (100 mL/Hr) IV Continuous <Continuous>  dextrose 5%. 1000 milliLiter(s) (50 mL/Hr) IV Continuous <Continuous>  dextrose 50% Injectable 25 Gram(s) IV Push once  dextrose 50% Injectable 12.5 Gram(s) IV Push once  dextrose 50% Injectable 25 Gram(s) IV Push once  gabapentin 400 milliGRAM(s) Oral daily  glucagon  Injectable 1 milliGRAM(s) IntraMuscular once  hydrALAZINE 50 milliGRAM(s) Oral three times a day  insulin glargine Injectable (LANTUS) 5 Unit(s) SubCutaneous at bedtime  insulin lispro (ADMELOG) corrective regimen sliding scale   SubCutaneous three times a day before meals  isosorbide   mononitrate ER Tablet (IMDUR) 60 milliGRAM(s) Oral daily  lidocaine   4% Patch 1 Patch Transdermal every 24 hours  melatonin 3 milliGRAM(s) Oral at bedtime  methocarbamol 500 milliGRAM(s) Oral two times a day  pantoprazole    Tablet 40 milliGRAM(s) Oral before breakfast  sacubitril 97 mG/valsartan 103 mG 1 Tablet(s) Oral two times a day  sodium bicarbonate 1300 milliGRAM(s) Oral three times a day  spironolactone 25 milliGRAM(s) Oral daily  ticagrelor 90 milliGRAM(s) Oral every 12 hours    MEDICATIONS  (PRN):  aluminum hydroxide/magnesium hydroxide/simethicone Suspension 30 milliLiter(s) Oral every 4 hours PRN Dyspepsia  benzocaine/menthol Lozenge 1 Lozenge Oral every 2 hours PRN Sore Throat  dextrose Oral Gel 15 Gram(s) Oral once PRN Blood Glucose LESS THAN 70 milliGRAM(s)/deciliter  loperamide 2 milliGRAM(s) Oral two times a day PRN Diarrhea  ondansetron Injectable 4 milliGRAM(s) IV Push every 6 hours PRN Nausea and/or Vomiting        I&O's Summary    25 Apr 2025 07:01  -  26 Apr 2025 07:00  --------------------------------------------------------  IN: 150 mL / OUT: 150 mL / NET: 0 mL        PHYSICAL EXAM:  Vital Signs Last 24 Hrs  T(C): 36.7 (26 Apr 2025 11:59), Max: 36.9 (26 Apr 2025 05:03)  T(F): 98.1 (26 Apr 2025 11:59), Max: 98.5 (26 Apr 2025 05:03)  HR: 64 (26 Apr 2025 11:59) (60 - 64)  BP: 103/61 (26 Apr 2025 11:59) (88/50 - 116/65)  BP(mean): --  RR: 20 (26 Apr 2025 11:59) (17 - 20)  SpO2: 100% (26 Apr 2025 11:59) (96% - 100%)    Parameters below as of 26 Apr 2025 11:59  Patient On (Oxygen Delivery Method): room air        CONSTITUTIONAL: NAD, on NC  RESPIRATORY: Normal respiratory effort; lungs are clear to auscultation bilaterally  CARDIOVASCULAR: Regular rate and rhythm   ABDOMEN: Nontender to palpation, normoactive bowel sounds  MSK: ROM wnl in extremities x4, R. knee improved    LABS:                        8.2    10.07 )-----------( 347      ( 26 Apr 2025 05:10 )             25.3     04-26    127[L]  |  91[L]  |  45.5[H]  ----------------------------<  157[H]  4.8   |  22.0  |  2.35[H]    Ca    7.7[L]      26 Apr 2025 05:10  Phos  3.9     04-26  Mg     2.4     04-26    TPro  6.0[L]  /  Alb  2.8[L]  /  TBili  0.3[L]  /  DBili  x   /  AST  14  /  ALT  <5  /  AlkPhos  118  04-26          Urinalysis Basic - ( 26 Apr 2025 05:10 )    Color: x / Appearance: x / SG: x / pH: x  Gluc: 157 mg/dL / Ketone: x  / Bili: x / Urobili: x   Blood: x / Protein: x / Nitrite: x   Leuk Esterase: x / RBC: x / WBC x   Sq Epi: x / Non Sq Epi: x / Bacteria: x        CAPILLARY BLOOD GLUCOSE      POCT Blood Glucose.: 167 mg/dL (26 Apr 2025 12:03)  POCT Blood Glucose.: 171 mg/dL (26 Apr 2025 08:07)  POCT Blood Glucose.: 170 mg/dL (25 Apr 2025 21:05)  POCT Blood Glucose.: 202 mg/dL (25 Apr 2025 17:00)        RADIOLOGY & ADDITIONAL TESTS:  Results Reviewed:   Imaging Personally Reviewed:  Electrocardiogram Personally Reviewed:

## 2025-04-26 NOTE — PROGRESS NOTE ADULT - ASSESSMENT
3/19/25 with c/o CP and found to have NSTEMI. She underwent LHC on 3/21 which showed mid-distal LAD severe stenosis, OM1 and OM2 severe stenosis, mid ISR 50%, distal ISR 99%, s/p PCI with 1 ZEINAB to RCA.  On 3/22 she had a fever with leukocytosis and her blood cultures were positive for E. Coli/ESBL. Her CT A/P showed left pyelonephritis. She also had an BRADY for which her Entresto was held. She was discharged home 3/31 and her CardioMEMS was not at goal and had been rising. She was advised to increase Torsemide 40 mg daily to twice daily on 4/4. Despite this, she has continued to gain weight and reports poor UOP response.     Admission labwork: Na 125, K 4.6, BUN 37.7, Cr 1.94, eGFR 27, Mg 2.3, proBNP 71506!  CXR: increased interstitial markings bilaterally, cardiomegaly.   On 4/10 she developed a fever, tmax 100.4F. RVP negative, empirically started on IV Zosyn.  4/13 s/p right knee fluid aspiration (s/p recent trauma/fall).  4/15 initiated on continuous Bumex infusion for inadequate response to intermittent dosing.  4/21: Received 1 dose of metolazone 5 mg and Bumex gtt switched to 4 mg IV BID.  4/24: s/p CRT-D    Prior Cardiac Testing:  RHC 4/22/25: RA 18, RV 63/18, PA 63/25/37, PCW 22, PA sat 37%, Jose CO/CI 2.5/1.5, Thermo CO 2.5, SVR 2800 dsc, PVR 4.5 WILLARD. No CardioMEMS recalibration needed.     TTE 4/21/25: LVEF 20-25%, LVIDd 4.3 cm, LVOT VTI 12.6 cm, grade 2 DD, mild RVE with mild RV dysfunction, mild-mod AS, mod MR/TR, mild OH, est PASP 57 mmHg, est RAP 8 mmHg.    TTE 3/21/25: LVEF 25-30%, grade II DD, normal RV size/function, severe LAE, moderate MR, mild TR, PASP 58 mmHg ( RAP 8 mmHg), no evidence of LV thrombus, multiple segmental abnormalities/regional WMA.

## 2025-04-26 NOTE — PROGRESS NOTE ADULT - PROBLEM SELECTOR PLAN 1
ICM, most recent TTE on 4/21/25 showed further decline of LVEF to 20-25%  Clinically appearing more euvolemic today - trace pedial edema right ankle.    hyponatremia today slight decline 127  Given 150ml of 2% saline x 1 today, repeat sodium level in am.   I and O not accurate.     Diuretics: Bumex 2 mg IV BID  Monitor on telemetry.  Strict i/o and daily weights.  Keep K > 4, Mg > 2.  Monitor renal function with ongoing diuresis.  GDMT: Continue Coreg 6.25 mg BID, Entresto  mg BID and spironolactone 25 mg daily. Can wean hydralazine to 25 mg TID.  BP soft today 90/60 today  Continue Imdur 60 mg daily for anginal pain.  No Farxiga 2/2 h/o pyelonephritis.   Device: h/o Micra PPM with chronic . She is now s/p CRT-D on 4/24.  Document strict I&Os and daily standing weight.  Repeat cardiomems tomorrow

## 2025-04-26 NOTE — PROGRESS NOTE ADULT - SUBJECTIVE AND OBJECTIVE BOX
[facility]                                                         CARDIOLOGY AT [facility]                                                                  [address] [address]                                                         Telephone: [phone]. Fax:[phone]                                                                             PROGRESS NOTE    Reason for follow up:     Update:   Remains hyponatremic As discussed with Dr. Botello give 150ml 2% sodium today  More euvolemic today     Review of symptoms:   Cardiac:  No chest pain. No dyspnea. No palpitations.  Respiratory: no cough. No dyspnea  Gastrointestinal: No diarrhea. No abdominal pain. No bleeding.   Neuro: No focal neuro complaints.    Vitals:  T(C): 36.7 (04-26-25 @ 11:59), Max: 36.9 (04-26-25 @ 05:03)  HR: 64 (04-26-25 @ 11:59) (60 - 64)  BP: 103/61 (04-26-25 @ 11:59) (88/50 - 116/65)  RR: 20 (04-26-25 @ 11:59) (17 - 20)  SpO2: 100% (04-26-25 @ 11:59) (96% - 100%)  I&O's Summary    25 Apr 2025 07:01  -  26 Apr 2025 07:00  --------------------------------------------------------  IN: 150 mL / OUT: 150 mL / NET: 0 mL    PHYSICAL EXAM:  Appearance: Comfortable. No acute distress  HEENT:  Atraumatic. Normocephalic.  Normal oral mucosa  Neurologic: A & O x 2, no gross focal deficits.  Cardiovascular: RRR S1 S2, No murmur, no rubs/gallops. No JVD  Respiratory: Lungs clear to auscultation, unlabored   Gastrointestinal:  Soft, Non-tender, + BS  Lower Extremities: + Peripheral Pulses, No clubbing, cyanosis, trace edema on right ankle  Psychiatry: Patient is calm. No agitation.   Skin: warm and dry.    CURRENT CARDIAC MEDICATIONS:  buMETAnide Injectable 2 milliGRAM(s) IV Push two times a day  carvedilol 6.25 milliGRAM(s) Oral every 12 hours  hydrALAZINE 50 milliGRAM(s) Oral three times a day  isosorbide   mononitrate ER Tablet (IMDUR) 60 milliGRAM(s) Oral daily  sacubitril 97 mG/valsartan 103 mG 1 Tablet(s) Oral two times a day  spironolactone 25 milliGRAM(s) Oral daily    CURRENT OTHER MEDICATIONS:  cephalexin 250 milliGRAM(s) Oral every 12 hours  acetaminophen     Tablet .. 975 milliGRAM(s) Oral every 8 hours  gabapentin 400 milliGRAM(s) Oral daily  melatonin 3 milliGRAM(s) Oral at bedtime  methocarbamol 500 milliGRAM(s) Oral two times a day  ondansetron Injectable 4 milliGRAM(s) IV Push every 6 hours PRN Nausea and/or Vomiting  aluminum hydroxide/magnesium hydroxide/simethicone Suspension 30 milliLiter(s) Oral every 4 hours PRN Dyspepsia  loperamide 2 milliGRAM(s) Oral two times a day PRN Diarrhea  pantoprazole    Tablet 40 milliGRAM(s) Oral before breakfast  atorvastatin 80 milliGRAM(s) Oral at bedtime  dextrose Oral Gel 15 Gram(s) Oral once, Stop order after: 1 Doses PRN Blood Glucose LESS THAN 70 milliGRAM(s)/deciliter  glucagon  Injectable 1 milliGRAM(s) IntraMuscular once, Stop order after: 1 Doses  insulin glargine Injectable (LANTUS) 5 Unit(s) SubCutaneous at bedtime  insulin lispro (ADMELOG) corrective regimen sliding scale   SubCutaneous three times a day before meals  aspirin  chewable 81 milliGRAM(s) Oral daily  benzocaine/menthol Lozenge 1 Lozenge Oral every 2 hours PRN Sore Throat  iron sucrose Injectable 200 milliGRAM(s) IV Push every 24 hours, Stop order after: 2 Days  lidocaine   4% Patch 1 Patch Transdermal every 24 hours  sodium bicarbonate 1300 milliGRAM(s) Oral three times a day  sodium chloride 2% . 150 milliLiter(s) (150 mL/Hr) IV Continuous <Continuous>  ticagrelor 90 milliGRAM(s) Oral every 12 hours    LABS:	 	                        8.2    10.07 )-----------( 347      ( 26 Apr 2025 05:10 )             25.3     04-26    127[L]  |  91[L]  |  45.5[H]  ----------------------------<  157[H]  4.8   |  22.0  |  2.35[H]    Ca    7.7[L]      26 Apr 2025 05:10  Phos  3.9     04-26  Mg     2.4     04-26    TPro  6.0[L]  /  Alb  2.8[L]  /  TBili  0.3[L]  /  DBili  x   /  AST  14  /  ALT  <5  /  AlkPhos  118  04-26    PT/INR/PTT ( 24 Apr 2025 05:01 )                       :                       :      15.1         :       31.9                  .        .                   .              .           .       1.34        .                                         TELEMETRY:    60's, no acute alarms noted.

## 2025-04-26 NOTE — PROGRESS NOTE ADULT - ASSESSMENT
74yoF with a history of coronary artery disease, heart failure, chronic kidney disease, hypertension, diabetes, and anemia who was recently hospitalized for NSTEMI with PCI and bacteremia who presented with dyspnea on exertion and weight gain. Continues on Bumex drip as per cardiology additional diuretics with metolazone / Bumex IV given 4/21. s/p repeat heart cath 4/22 with no need for MEMS recalibration however plan for upgarde to CRT-P vs CRT-D device 4/24, s/p Cardiac MR. Diuresis resumed by HF team 4/25, continue to monitor.     #Acute on chronic systolic heart failure  - Recent echocardiogram noted ejection fraction of 31% with multiple segmental abnormalities  - Diuresis as per HF guidance, resumed IV bumex 2 mg BID 4/25, continue to monitor Mems data, given hypertonic saline in setting of hyponatremia (likely dilutional per nephrology)   s/p Metolazone 4/21, with Bumex IV push   - Strict in and out  - Daily weights  - On metoprolol, Imdur 60 mg daily, spironolactone, Entresto full dose, HDZN increased to 50 mg TID,   - Heart Failure on board recs appreciated, s/p PPM device upgrade 4/24 without issue, functioning well now adjusting diuresis   - Given patients recurrent UTI's / history of bacteremia, EP requesting ID input for potential abx's prior to device upgrade / implant, consulted, recs appreciated, no rec for prophylactic abx  -s/p repeat Heart cath 4/22, no recalibration of Mems needed   - pt with reproducible chest wall pain, started on pain regimen, remains intermittent with improvement with GI cocktail / pain medications, continue to monitor     #Right knee pain  - knee xray showing effusion, possible in setting of fall  - c/w current pain regimen, adjust as needed   - CT knee result noted  - ortho consulted, sp aspiration fluid neg for crystals/infections  ROM improving     #Fever  resolved   noted febrile episode 4/19 overnight to 100.9, repeat RVP, s/p Abxs with Zosyn, continue to monitor off Abx at this time   - CXR noted with ?Atelectasis vs edema vs infx  - sp Zosyn x 5 days   - swallow eval result noted, diet advanced   - Bcx NGTD    #Diarrhea  resolved   - possibly abx related  - GI pcr neg, PRN imodium started    #Coronary artery disease  - Continue on aspirin, ticagrelor, and atorvastatin  - Recent cardiac catheterization with PCI, Brillinta dosage increased to 90 BID from 60 as per HF  - initial trop 140 -> 118  - pt with recurrent chest pain, high up in chest, trop downtrended   - c/w maalox, pain improves with GI cocktail, as above consideration for repeat heart cath this admission     #Chronic kidney disease  - Renal function improved from prior values  - Continue to monitor while on diuresis    #Hyponatremia  improved   - Hypervolemic on presentation  - Bumetanide for diuresis, may adjust as above addition of metolazone / bumex IV push, may hold bumex drip and monitor ouptut     #Diabetes  - Insulin coverage  - Close monitoring of blood glucose levels  - Gabapentin for neuropathy  - need Glucometer on discharge     DVT ppx: ASA/Brilinta   Dispo: Pending HF optimization, when on stable PO diuretic will be ready for DC home expect 2-3 further days    PT eval - LALA, but family prefers home   74yoF with a history of coronary artery disease, heart failure, chronic kidney disease, hypertension, diabetes, and anemia who was recently hospitalized for NSTEMI with PCI and bacteremia who presented with dyspnea on exertion and weight gain. Continues on Bumex drip as per cardiology additional diuretics with metolazone / Bumex IV given 4/21. s/p repeat heart cath 4/22 with no need for MEMS recalibration however plan for upgarde to CRT-P vs CRT-D device 4/24, s/p Cardiac MR. Diuresis resumed by HF team 4/25, continue to monitor.     #Acute on chronic systolic heart failure  - Recent echocardiogram noted ejection fraction of 31% with multiple segmental abnormalities  - Diuresis as per HF guidance, resumed IV bumex 2 mg BID 4/25, continue to monitor Mems data, given hypertonic saline in setting of hyponatremia (likely dilutional per nephrology)   - s/p Metolazone 4/21, with Bumex IV push   - Strict I/O, daily weights  - On metoprolol, Imdur 60 mg daily, spironolactone, Entresto full dose, hydralazine decreased to 25 mg TID given soft BP  - Heart Failure on board recs appreciated, s/p PPM device upgrade 4/24 without issue, functioning well now adjusting diuresis, repeat cardiomems tomorrow 4/27.  - Given patients recurrent UTI's / history of bacteremia, EP requesting ID input for potential abx's prior to device upgrade / implant, consulted, recs appreciated, no rec for prophylactic abx  - s/p repeat Heart cath 4/22, no recalibration of Mems needed   - pt with reproducible chest wall pain, started on pain regimen, remains intermittent with improvement with GI cocktail / pain medications, continue to monitor     #Chronic kidney disease  - Renal function increased today  - Continue to monitor while on diuresis    #Right knee pain  - knee xray showing effusion, possible in setting of fall  - c/w current pain regimen, adjust as needed   - CT knee result noted  - ortho consulted, sp aspiration fluid neg for crystals/infections  - ROM improving     #Fever  resolved   noted febrile episode 4/19 overnight to 100.9, repeat RVP, s/p Abxs with Zosyn, continue to monitor off Abx at this time   - CXR noted with ?Atelectasis vs edema vs infx  - sp Zosyn x 5 days   - swallow eval result noted, diet advanced   - Bcx NGTD    #Diarrhea  resolved   - possibly abx related  - GI pcr neg, PRN imodium started    #Coronary artery disease  - Continue on aspirin, ticagrelor, and atorvastatin  - Recent cardiac catheterization with PCI, Brillinta dosage increased to 90 BID from 60 as per HF  - initial trop 140 -> 118  - pt with recurrent chest pain, high up in chest, trop downtrended   - c/w maalox, pain improves with GI cocktail, as above consideration for repeat heart cath this admission     #Hyponatremia  - Hypervolemic on presentation  - Bumetanide for diuresis, may adjust as above addition of metolazone / bumex IV push, may hold bumex drip and monitor ouptut     #Diabetes  - Insulin coverage  - Close monitoring of blood glucose levels  - Gabapentin for neuropathy  - need Glucometer on discharge     DVT ppx: ASA/Brilinta   Dispo: Pending HF optimization, when on stable PO diuretic will be ready for DC home expect 2-3 further days    PT eval - LALA, but family prefers home

## 2025-04-26 NOTE — PROVIDER CONTACT NOTE (OTHER) - SITUATION
Pt is due for bumex, coreg, hydralazine, entresto, and aldactone. BP was 88/50 overnight and is currently 104/59.
Patient experiencing 5/10 chest pain with SOB.
Patient's family reports nausea with one episode of vomiting.
Pt c/o midsternal CP 8/10 pain, pt states the CP feels better when she breathes.
Hx of CKD, diuretics throughout this stay and received 2mg of Bumex 4/25.

## 2025-04-26 NOTE — PROGRESS NOTE ADULT - PROBLEM SELECTOR PLAN 2
Most recent PCI 3/21/25  Continue BB as above and Imdur. Patient was also on Ranexa at home.  Continue daily ASA and Brilinta  Continue high intensity statin therapy - Atorvastatin 80mg nightly.  DASH diet - low cholesterol

## 2025-04-27 LAB
ANION GAP SERPL CALC-SCNC: 14 MMOL/L — SIGNIFICANT CHANGE UP (ref 5–17)
BUN SERPL-MCNC: 47.9 MG/DL — HIGH (ref 8–20)
CALCIUM SERPL-MCNC: 7.8 MG/DL — LOW (ref 8.4–10.5)
CHLORIDE SERPL-SCNC: 92 MMOL/L — LOW (ref 96–108)
CO2 SERPL-SCNC: 21 MMOL/L — LOW (ref 22–29)
CREAT SERPL-MCNC: 2.51 MG/DL — HIGH (ref 0.5–1.3)
EGFR: 20 ML/MIN/1.73M2 — LOW
EGFR: 20 ML/MIN/1.73M2 — LOW
GLUCOSE BLDC GLUCOMTR-MCNC: 121 MG/DL — HIGH (ref 70–99)
GLUCOSE BLDC GLUCOMTR-MCNC: 163 MG/DL — HIGH (ref 70–99)
GLUCOSE BLDC GLUCOMTR-MCNC: 200 MG/DL — HIGH (ref 70–99)
GLUCOSE BLDC GLUCOMTR-MCNC: 212 MG/DL — HIGH (ref 70–99)
GLUCOSE SERPL-MCNC: 109 MG/DL — HIGH (ref 70–99)
HCT VFR BLD CALC: 26.5 % — LOW (ref 34.5–45)
HGB BLD-MCNC: 8.7 G/DL — LOW (ref 11.5–15.5)
MCHC RBC-ENTMCNC: 29 PG — SIGNIFICANT CHANGE UP (ref 27–34)
MCHC RBC-ENTMCNC: 32.8 G/DL — SIGNIFICANT CHANGE UP (ref 32–36)
MCV RBC AUTO: 88.3 FL — SIGNIFICANT CHANGE UP (ref 80–100)
NRBC # BLD AUTO: 0 K/UL — SIGNIFICANT CHANGE UP (ref 0–0)
NRBC # FLD: 0 K/UL — SIGNIFICANT CHANGE UP (ref 0–0)
NRBC BLD AUTO-RTO: 0 /100 WBCS — SIGNIFICANT CHANGE UP (ref 0–0)
PLATELET # BLD AUTO: 343 K/UL — SIGNIFICANT CHANGE UP (ref 150–400)
PMV BLD: 9.3 FL — SIGNIFICANT CHANGE UP (ref 7–13)
POTASSIUM SERPL-MCNC: 5.3 MMOL/L — SIGNIFICANT CHANGE UP (ref 3.5–5.3)
POTASSIUM SERPL-SCNC: 5.3 MMOL/L — SIGNIFICANT CHANGE UP (ref 3.5–5.3)
RBC # BLD: 3 M/UL — LOW (ref 3.8–5.2)
RBC # FLD: 18.9 % — HIGH (ref 10.3–14.5)
SODIUM SERPL-SCNC: 127 MMOL/L — LOW (ref 135–145)
WBC # BLD: 10.48 K/UL — SIGNIFICANT CHANGE UP (ref 3.8–10.5)
WBC # FLD AUTO: 10.48 K/UL — SIGNIFICANT CHANGE UP (ref 3.8–10.5)

## 2025-04-27 PROCEDURE — 99233 SBSQ HOSP IP/OBS HIGH 50: CPT

## 2025-04-27 PROCEDURE — 99232 SBSQ HOSP IP/OBS MODERATE 35: CPT

## 2025-04-27 RX ORDER — BUMETANIDE 1 MG/1
0.5 TABLET ORAL
Qty: 20 | Refills: 0 | Status: DISCONTINUED | OUTPATIENT
Start: 2025-04-27 | End: 2025-04-29

## 2025-04-27 RX ADMIN — Medication 975 MILLIGRAM(S): at 05:37

## 2025-04-27 RX ADMIN — Medication 3 MILLIGRAM(S): at 21:21

## 2025-04-27 RX ADMIN — Medication 40 MILLIGRAM(S): at 05:36

## 2025-04-27 RX ADMIN — INSULIN LISPRO 2: 100 INJECTION, SOLUTION INTRAVENOUS; SUBCUTANEOUS at 12:39

## 2025-04-27 RX ADMIN — SACUBITRIL AND VALSARTAN 1 TABLET(S): 6; 6 PELLET ORAL at 17:48

## 2025-04-27 RX ADMIN — INSULIN LISPRO 4: 100 INJECTION, SOLUTION INTRAVENOUS; SUBCUTANEOUS at 17:50

## 2025-04-27 RX ADMIN — LIDOCAINE HYDROCHLORIDE 1 PATCH: 20 JELLY TOPICAL at 12:07

## 2025-04-27 RX ADMIN — CARVEDILOL 6.25 MILLIGRAM(S): 3.12 TABLET, FILM COATED ORAL at 17:49

## 2025-04-27 RX ADMIN — BUMETANIDE 2 MILLIGRAM(S): 1 TABLET ORAL at 08:31

## 2025-04-27 RX ADMIN — SACUBITRIL AND VALSARTAN 1 TABLET(S): 6; 6 PELLET ORAL at 08:32

## 2025-04-27 RX ADMIN — Medication 975 MILLIGRAM(S): at 21:21

## 2025-04-27 RX ADMIN — Medication 81 MILLIGRAM(S): at 12:05

## 2025-04-27 RX ADMIN — Medication 1300 MILLIGRAM(S): at 14:17

## 2025-04-27 RX ADMIN — BUMETANIDE 2.5 MG/HR: 1 TABLET ORAL at 14:16

## 2025-04-27 RX ADMIN — Medication 50 MILLIGRAM(S): at 21:21

## 2025-04-27 RX ADMIN — ISOSORBIDE MONONITRATE 60 MILLIGRAM(S): 60 TABLET, EXTENDED RELEASE ORAL at 12:06

## 2025-04-27 RX ADMIN — TICAGRELOR 90 MILLIGRAM(S): 90 TABLET ORAL at 17:48

## 2025-04-27 RX ADMIN — Medication 975 MILLIGRAM(S): at 14:17

## 2025-04-27 RX ADMIN — ATORVASTATIN CALCIUM 80 MILLIGRAM(S): 80 TABLET, FILM COATED ORAL at 21:20

## 2025-04-27 RX ADMIN — Medication 1300 MILLIGRAM(S): at 21:20

## 2025-04-27 RX ADMIN — METHOCARBAMOL 500 MILLIGRAM(S): 500 TABLET, FILM COATED ORAL at 17:49

## 2025-04-27 RX ADMIN — TICAGRELOR 90 MILLIGRAM(S): 90 TABLET ORAL at 05:37

## 2025-04-27 RX ADMIN — CEPHALEXIN 250 MILLIGRAM(S): 250 CAPSULE ORAL at 17:49

## 2025-04-27 RX ADMIN — INSULIN GLARGINE-YFGN 5 UNIT(S): 100 INJECTION, SOLUTION SUBCUTANEOUS at 21:20

## 2025-04-27 RX ADMIN — Medication 25 MILLIGRAM(S): at 08:37

## 2025-04-27 RX ADMIN — Medication 25 MILLIGRAM(S): at 12:07

## 2025-04-27 RX ADMIN — CEPHALEXIN 250 MILLIGRAM(S): 250 CAPSULE ORAL at 05:37

## 2025-04-27 RX ADMIN — CARVEDILOL 6.25 MILLIGRAM(S): 3.12 TABLET, FILM COATED ORAL at 09:44

## 2025-04-27 RX ADMIN — GABAPENTIN 400 MILLIGRAM(S): 400 CAPSULE ORAL at 12:05

## 2025-04-27 RX ADMIN — Medication 50 MILLIGRAM(S): at 14:18

## 2025-04-27 RX ADMIN — METHOCARBAMOL 500 MILLIGRAM(S): 500 TABLET, FILM COATED ORAL at 05:37

## 2025-04-27 RX ADMIN — Medication 1300 MILLIGRAM(S): at 05:37

## 2025-04-27 NOTE — PROGRESS NOTE ADULT - SUBJECTIVE AND OBJECTIVE BOX
[facility]                                                         CARDIOLOGY AT [facility]                                                                  [address] [facility] [id number] [place] [address]- [facility]                                                         Telephone: [phone]. Fax:[phone]                                                                             PROGRESS NOTE    Reason for follow up: Heart failure  Update: Patient reports sob but no chest pain except sx site pain  Cardiomens 28-30  Output  800 CC/24 hr      Review of symptoms:   Cardiac:  No chest pain. + dyspnea. No palpitations.  Respiratory: no cough. No dyspnea  Gastrointestinal: No diarrhea. No abdominal pain. No bleeding.   Neuro: No focal neuro complaints.      Vitals:  T(C): 36.6 (04-27-25 @ 07:15), Max: 36.9 (04-26-25 @ 16:23)  HR: 68 (04-27-25 @ 09:47) (62 - 76)  BP: 111/61 (04-27-25 @ 09:47) (97/57 - 132/71)  RR: 18 (04-27-25 @ 09:47) (18 - 20)  SpO2: 97% (04-27-25 @ 09:47) (94% - 100%)  Wt(kg): --  I&O's Summary    26 Apr 2025 07:01  -  27 Apr 2025 07:00  --------------------------------------------------------  IN: 0 mL /     PHYSICAL EXAM:  Appearance: Comfortable. No acute distress sob when lying flat  HEENT:  Atraumatic. Normocephalic.  Normal oral mucosa  + JVD  Neurologic: A & O x 3, no gross focal deficits.  Cardiovascular: RRR S1 S2, 2/6  murmur, no rubs/gallops. No JVD  Respiratory: Lungs clear to auscultation, unlabored   Gastrointestinal:  Soft, Non-tender, + BS  Lower Extremities: No edema  Psychiatry: Patient is calm. No agitation.   Skin: warm and dry.      CURRENT MEDICATIONS:  MEDICATIONS  (STANDING):  acetaminophen     Tablet .. 975 milliGRAM(s) Oral every 8 hours  aspirin  chewable 81 milliGRAM(s) Oral daily  atorvastatin 80 milliGRAM(s) Oral at bedtime  buMETAnide Injectable 2 milliGRAM(s) IV Push two times a day  carvedilol 6.25 milliGRAM(s) Oral every 12 hours  cephalexin 250 milliGRAM(s) Oral every 12 hours  gabapentin 400 milliGRAM(s) Oral daily  glucagon  Injectable 1 milliGRAM(s) IntraMuscular once  hydrALAZINE 50 milliGRAM(s) Oral every 8 hours  insulin glargine Injectable (LANTUS) 5 Unit(s) SubCutaneous at bedtime  insulin lispro (ADMELOG) corrective regimen sliding scale   SubCutaneous three times a day before meals  isosorbide   mononitrate ER Tablet (IMDUR) 60 milliGRAM(s) Oral daily  lidocaine   4% Patch 1 Patch Transdermal every 24 hours  melatonin 3 milliGRAM(s) Oral at bedtime  methocarbamol 500 milliGRAM(s) Oral two times a day  pantoprazole    Tablet 40 milliGRAM(s) Oral before breakfast  sacubitril 97 mG/valsartan 103 mG 1 Tablet(s) Oral two times a day  sodium bicarbonate 1300 milliGRAM(s) Oral three times a day  spironolactone 25 milliGRAM(s) Oral daily  ticagrelor 90 milliGRAM(s) Oral every 12 hours      LABS:	 	                            8.7    10.48 )-----------( 343      ( 27 Apr 2025 06:00 )             26.5     04-27    127[L]  |  92[L]  |  47.9[H]  ----------------------------<  109[H]  5.3   |  21.0[L]  |  2.51[H]    Ca    7.8[L]      27 Apr 2025 06:00  Phos  3.9     04-26  Mg     2.4     04-26    TPro  6.0[L]  /  Alb  2.8[L]  /  TBili  0.3[L]  /  DBili  x   /  AST  14  /  ALT  <5  /  AlkPhos  118  04-26      TELEMETRY: nsr  ECG:  	      DIAGNOSTIC TESTING:  [ ] Echocardiogram: < from: TTE W or WO Ultrasound Enhancing Agent (04.21.25 @ 13:52) >   1. Technically difficult image quality.   2. Left ventricular cavity is normal in size. Left ventricular wall thickness is normal. Left ventricular systolic function is severely decreased with an ejection fraction visually estimated at 20 to 25 %.   3. Mid anteroseptal segment, entire apex, mid inferoseptal segment, and mid anterolateral segment are abnormal.   4. Left atrium is moderately dilated.   5. There is moderate (grade 2) left ventricular diastolic dysfunction.   6. Mildly enlarged right ventricular cavity size and mildly reduced right ventricular systolic function.   7. Mild to moderate aortic stenosis.   8. The peak transaortic velocity is 1.57 m/s, peak transaortic gradient is 9.9 mmHg and mean transaortic gradient is5.0 mmHg with an LVOT/aortic valve VTI ratio of 0.37. The effective orifice area is estimated at 1.04 cm² by the continuity equation and indexed at 0.62 cm²/m².   9. Mild mitral valve leaflet calcification.  10. Moderate mitral regurgitation.  11. Moderate tricuspid regurgitation.  12. Estimated pulmonary artery systolic pressure is 57 mmHg, moderate pulmonary hypertension.    [ ]  Catheterization:  < from: Cardiac Catheterization (03.21.25 @ 08:08) >  LM: Luminal irregularities    LAD: Severe diffuse stenosis from mid to distal    LCx: Severe stenosis in OM1 and OM2 (small vessels)    RCA: mid ISR 50%, distal ISR 99% stenosis. S/p IVUS (stent  underexpanded and severe stenosis) and PCI with 1 ZEINAB. POBA  Mid RCA   LV EDP 17

## 2025-04-27 NOTE — PROGRESS NOTE ADULT - NS ATTEND AMEND GEN_ALL_CORE FT
75 y/o Khmer speaking female with PMH of ICM/HFrEF (LVEF 35-40%), CAD s/p PCI, Mobitz II s/p PPM (MIRNA Dawn), HTN, HLD, PAD with chronic right LE wound, DM2, and CKD3, who presented to the ED on 3/19 with c/o CP, found to have NSTEMI. She underwent PCI to her RCA for ISR on 3/21/25. Over the next couple of days she developed fever, leukocytosis and worsening renal function. Her blood cultures are positive for ESBL E.coli and CT abdomen is concerning for left pyelonephritis. She was treated with antibiotics and was discharged on oral torsemide and she was instructed to check her cardiomems reading for further diuretic titration. She presented to the HF clinic on April 9th and was found to be very volume overloaded on exam with pro BNP > 35515, weight gain of 20 pounds and cardiomems PAD 32-33. She was admitted at [facility] for further management.     TTE: 3/21/25 LVEF 25-30%, LVEDD 4.6 cm, normal RV size and function, moderate MR, mild TR, PASP 58  TTE: LVEF 20 -25%, LVEDD 4.3 cm, mild RVE with mild RV dysfunction, moderate MR and moderate TR, PASP 57, AV dyssynchrony noted.   RHC: 4/22/25: RA 18, RV 63/18, PA 63/25/37, PCW 22, PA sat 37%, Jose CO/CI 2.5/1.5, Thermo CO 2.5, SVR 2800, PVR 4.5 WILLARD. No CardioMEMS recalibration needed.    Assessment:  Acute on chronic systolic heart failure  S/p CRT-D on 4/24/2025 for AV dyssynchrony and 100% RV pacing  Severe LV systolic function  Mild RV dysfunction  Ischemic cardiomyopathy  CAD s/p recent PCI to RCA ISR in 3/25 with LAD and LCx disease  Chronic kidney disease  Mobitz type II s/p MICRA  Hypertension  Hyperlipidemia  PAD  Diabetes mellitus type 2      Plan:   PAD 28 today. Remains volume overloaded on exam with warm extremities, however with worsening BRADY. Hyponatremia stable. Switch bumex to continuous gtt. Monitor I's/O's, daily weights.   Continue coreg 6.25 mg BID,  Entresto  mg BID and spironolactone 25 mg daily.    Continue hydralazine to 50 mg TID and Imdur to 60 mg daily.   Not on SGLT2i given recent pyelonephritis.  Continue DAPT and statin for recent PCI.   We will plan to assess LV function in 4-6 weeks as an outpatient.    Patient to be followed by advanced HF tomorrow

## 2025-04-27 NOTE — PROGRESS NOTE ADULT - ASSESSMENT
74yoF with a history of coronary artery disease, heart failure, chronic kidney disease, hypertension, diabetes, and anemia who was recently hospitalized for NSTEMI with PCI and bacteremia who presented with dyspnea on exertion and weight gain. Continues on Bumex drip as per cardiology additional diuretics with metolazone / Bumex IV given 4/21. s/p repeat heart cath 4/22 with no need for MEMS recalibration however plan for upgarde to CRT-P vs CRT-D device 4/24, s/p Cardiac MR. Diuresis resumed by HF team 4/25, continue to monitor.     #Acute on chronic systolic heart failure  - Recent echocardiogram noted ejection fraction of 31% with multiple segmental abnormalities  - Diuresis as per HF guidance, resumed IV bumex 2 mg BID 4/25, continue to monitor Mems data, given hypertonic saline in setting of hyponatremia (likely dilutional per nephrology)   - s/p Metolazone 4/21, with Bumex IV push   - Strict I/O, daily weights  - On metoprolol, Imdur 60 mg daily, spironolactone, Entresto full dose, hydralazine decreased to 25 mg TID given soft BP  - Heart Failure on board recs appreciated, s/p PPM device upgrade 4/24 without issue, functioning well now adjusting diuresis, repeat cardiomems tomorrow 4/27.  - Given patients recurrent UTI's / history of bacteremia, EP requesting ID input for potential abx's prior to device upgrade / implant, consulted, recs appreciated, no rec for prophylactic abx  - s/p repeat Heart cath 4/22, no recalibration of Mems needed   - pt with reproducible chest wall pain, started on pain regimen, remains intermittent with improvement with GI cocktail / pain medications, continue to monitor     #Chronic kidney disease  - Renal function increased today  - Continue to monitor while on diuresis    #Right knee pain  - knee xray showing effusion, possible in setting of fall  - c/w current pain regimen, adjust as needed   - CT knee result noted  - ortho consulted, sp aspiration fluid neg for crystals/infections  - ROM improving     #Fever  resolved   noted febrile episode 4/19 overnight to 100.9, repeat RVP, s/p Abxs with Zosyn, continue to monitor off Abx at this time   - CXR noted with ?Atelectasis vs edema vs infx  - sp Zosyn x 5 days   - swallow eval result noted, diet advanced   - Bcx NGTD    #Diarrhea  resolved   - possibly abx related  - GI pcr neg, PRN imodium started    #Coronary artery disease  - Continue on aspirin, ticagrelor, and atorvastatin  - Recent cardiac catheterization with PCI, Brillinta dosage increased to 90 BID from 60 as per HF  - initial trop 140 -> 118  - pt with recurrent chest pain, high up in chest, trop downtrended   - c/w maalox, pain improves with GI cocktail, as above consideration for repeat heart cath this admission     #Hyponatremia  - Hypervolemic on presentation  - Bumetanide for diuresis, may adjust as above addition of metolazone / bumex IV push, may hold bumex drip and monitor ouptut     #Diabetes  - Insulin coverage  - Close monitoring of blood glucose levels  - Gabapentin for neuropathy  - need Glucometer on discharge     DVT ppx: ASA/Brilinta   Dispo: Pending HF optimization, when on stable PO diuretic will be ready for DC home expect 2-3 further days    PT eval - [facility], but family prefers home   74yoF with a history of coronary artery disease, heart failure, chronic kidney disease, hypertension, diabetes, and anemia who was recently hospitalized for NSTEMI with PCI and bacteremia who presented with dyspnea on exertion and weight gain. Continues on Bumex drip as per cardiology additional diuretics with metolazone / Bumex IV given 4/21. s/p repeat heart cath 4/22 with no need for MEMS recalibration however plan for upgarde to CRT-P vs CRT-D device 4/24, s/p Cardiac MR. Diuresis resumed by HF team 4/25, continue to monitor.     #Acute on chronic systolic heart failure  - Recent echocardiogram noted ejection fraction of 31% with multiple segmental abnormalities  - Diuresis as per HF guidance, resumed IV bumex 2 mg BID 4/25, continue to monitor Mems data, given hypertonic saline in setting of hyponatremia (likely dilutional per nephrology)   - s/p Metolazone 4/21, with Bumex IV push   - Strict I/O, daily weights  - On metoprolol, Imdur 60 mg daily, spironolactone, Entresto full dose, hydralazine 50mg TID   - Heart Failure on board recs appreciated, s/p PPM device upgrade 4/24 without issue, functioning well now adjusting diuresis   - Given patients recurrent UTI's / history of bacteremia, EP requesting ID input for potential abx's prior to device upgrade / implant, consulted, recs appreciated, no rec for prophylactic abx  - s/p repeat Heart cath 4/22, no recalibration of Mems needed     #Chronic kidney disease  - Renal function increased today  - Continue to monitor while on diuresis  - Nephro on board, will f/u recs    #Right knee pain  - knee xray showing effusion, possible in setting of fall  - c/w current pain regimen, adjust as needed   - CT knee result noted  - ortho consulted, sp aspiration fluid neg for crystals/infections  - ROM improving     #Fever  resolved   noted febrile episode 4/19 overnight to 100.9, repeat RVP, s/p Abxs with Zosyn, continue to monitor off Abx at this time   - CXR noted with ?Atelectasis vs edema vs infx  - sp Zosyn x 5 days   - swallow eval result noted, diet advanced   - Bcx NGTD    #Diarrhea  resolved   - possibly abx related  - GI pcr neg, PRN imodium started    #Coronary artery disease  - Continue on aspirin, ticagrelor, and atorvastatin  - Recent cardiac catheterization with PCI, Brillinta dosage increased to 90 BID from 60 as per HF  - initial trop 140 -> 118  - pt with recurrent chest pain, high up in chest, trop downtrended   - c/w maalox, pain improves with GI cocktail, as above consideration for repeat heart cath this admission     #Hyponatremia  - Hypervolemic on presentation  - Bumetanide for diuresis, may adjust as above addition of metolazone / bumex IV push, may hold bumex drip and monitor ouptut     #Diabetes  - Insulin coverage  - Close monitoring of blood glucose levels  - Gabapentin for neuropathy  - need Glucometer on discharge     DVT ppx: ASA/Brilinta   Dispo: Pending improvement in renal function and hyponatremia, likely home in 2-3 days  PT eval - [facility], but family prefers home   74yoF with a history of coronary artery disease, heart failure, chronic kidney disease, hypertension, diabetes, and anemia who was recently hospitalized for NSTEMI with PCI and bacteremia who presented with dyspnea on exertion and weight gain. Continues on Bumex drip as per cardiology additional diuretics with metolazone / Bumex IV given 4/21. s/p repeat heart cath 4/22 with no need for MEMS recalibration however plan for upgarde to CRT-P vs CRT-D device 4/24, s/p Cardiac MR. Diuresis resumed by HF team 4/25, continue to monitor.     #Acute on chronic systolic heart failure  - Recent echocardiogram noted ejection fraction of 31% with multiple segmental abnormalities  - Diuresis as per HF guidance, resumed IV bumex 2 mg BID 4/25, continue to monitor Mems data, given hypertonic saline in setting of hyponatremia (likely dilutional per nephrology)   - s/p Metolazone 4/21, with Bumex IV push   - Strict I/O, daily weights  - On metoprolol, Imdur 60 mg daily, spironolactone, Entresto full dose, hydralazine 50mg TID   - Heart Failure on board recs appreciated, s/p PPM device upgrade 4/24 without issue, functioning well now adjusting diuresis, recheck cardiomems this AM (4/27)  - Given patients recurrent UTI's / history of bacteremia, EP requesting ID input for potential abx's prior to device upgrade / implant, consulted, recs appreciated, no rec for prophylactic abx  - s/p repeat Heart cath 4/22, no recalibration of Mems needed     #Chronic kidney disease  - Renal function increased today  - Continue to monitor while on diuresis  - Nephro on board, will f/u recs    #Right knee pain  - knee xray showing effusion, possible in setting of fall  - c/w current pain regimen, adjust as needed   - CT knee result noted  - ortho consulted, sp aspiration fluid neg for crystals/infections  - ROM improving     #Fever  #UTI  resolved   noted febrile episode 4/19 overnight to 100.9, repeat RVP, s/p Abxs with Zosyn, continue to monitor off Abx at this time   - CXR noted with ?Atelectasis vs edema vs infx  - U/A from 4/25 is positive  - Pt has hx of ESBL, prior susceptibilities to Zosyn which she completed a 5 day course  - F/u urine cx  - swallow eval result noted, diet advanced   - Bcx NGTD    #Diarrhea  resolved   - possibly abx related  - GI pcr neg, PRN imodium started    #Coronary artery disease  - Continue on aspirin, ticagrelor, and atorvastatin  - Recent cardiac catheterization with PCI, Brillinta dosage increased to 90 BID from 60 as per HF  - initial trop 140 -> 118  - pt with recurrent chest pain, high up in chest, trop downtrended   - c/w maalox, pain improves with GI cocktail, as above consideration for repeat heart cath this admission     #Hyponatremia  - Hypervolemic on presentation  - Bumetanide for diuresis, may adjust as above addition of metolazone / bumex IV push, may hold bumex drip and monitor ouptut     #Diabetes  - Insulin coverage  - Close monitoring of blood glucose levels  - Gabapentin for neuropathy  - need Glucometer on discharge     DVT ppx: ASA/Brilinta   Dispo: Pending improvement in renal function and hyponatremia, likely home in 2-3 days  PT eval - [facility], but family prefers home

## 2025-04-27 NOTE — PROGRESS NOTE ADULT - PROBLEM SELECTOR PLAN 1
Patient complains of sob  bnp rising 70k  Hyponatremic ? hypovolemic  Low na diet  Keep K >4 and mg >2  Intake and out put  start bumex drip at 0.5 mg /hr  Cardiomens in the am  Low Na, 1.5L FR diet recommended  b/p 111/61  GDMT coreg, entresto spirolatone

## 2025-04-27 NOTE — PROGRESS NOTE ADULT - ASSESSMENT
A) Anemia with low  t-sat, CRF, Cardiomyopathy with new  pacer; AGMA; secondary  hyperparathyroid. Dilutional  hyponatremia chronic.    P) IV  diuretic, may come to need  dialysis.

## 2025-04-27 NOTE — PROGRESS NOTE ADULT - SUBJECTIVE AND OBJECTIVE BOX
NEPHROLOGY INTERVAL HPI/OVERNIGHT EVENTS:    No new events.    MEDICATIONS  (STANDING):  acetaminophen     Tablet .. 975 milliGRAM(s) Oral every 8 hours  aspirin  chewable 81 milliGRAM(s) Oral daily  atorvastatin 80 milliGRAM(s) Oral at bedtime  carvedilol 6.25 milliGRAM(s) Oral every 12 hours  dextrose 5%. 1000 milliLiter(s) (100 mL/Hr) IV Continuous <Continuous>  dextrose 5%. 1000 milliLiter(s) (50 mL/Hr) IV Continuous <Continuous>  dextrose 50% Injectable 25 Gram(s) IV Push once  dextrose 50% Injectable 12.5 Gram(s) IV Push once  dextrose 50% Injectable 25 Gram(s) IV Push once  gabapentin 400 milliGRAM(s) Oral daily  glucagon  Injectable 1 milliGRAM(s) IntraMuscular once  hydrALAZINE 50 milliGRAM(s) Oral three times a day  insulin glargine Injectable (LANTUS) 5 Unit(s) SubCutaneous at bedtime  insulin lispro (ADMELOG) corrective regimen sliding scale   SubCutaneous three times a day before meals  isosorbide   mononitrate ER Tablet (IMDUR) 60 milliGRAM(s) Oral daily  lidocaine   4% Patch 1 Patch Transdermal every 24 hours  melatonin 3 milliGRAM(s) Oral at bedtime  methocarbamol 500 milliGRAM(s) Oral two times a day  pantoprazole    Tablet 40 milliGRAM(s) Oral before breakfast  sacubitril 97 mG/valsartan 103 mG 1 Tablet(s) Oral two times a day  sertraline 25 milliGRAM(s) Oral daily  spironolactone 25 milliGRAM(s) Oral daily  ticagrelor 90 milliGRAM(s) Oral every 12 hours    MEDICATIONS  (PRN):  aluminum hydroxide/magnesium hydroxide/simethicone Suspension 30 milliLiter(s) Oral every 4 hours PRN Dyspepsia  dextrose Oral Gel 15 Gram(s) Oral once PRN Blood Glucose LESS THAN 70 milliGRAM(s)/deciliter  loperamide 2 milliGRAM(s) Oral two times a day PRN Diarrhea      Allergies    No Known Allergies        Vital Signs Last 24 Hrs  T(C): 36.6 (27 Apr 2025 07:15), Max: 36.9 (26 Apr 2025 16:23)  T(F): 97.9 (27 Apr 2025 07:15), Max: 98.5 (26 Apr 2025 16:23)  HR: 83 (27 Apr 2025 14:15) (62 - 83)  BP: 128/72 (27 Apr 2025 14:15) (97/57 - 132/71)  BP(mean): --  RR: 18 (27 Apr 2025 09:47) (18 - 19)  SpO2: 97% (27 Apr 2025 09:47) (94% - 98%)    Parameters below as of 27 Apr 2025 08:42  Patient On (Oxygen Delivery Method): room air      T(C): 36.7 (25 Apr 2025 13:12), Max: 36.9 (25 Apr 2025 00:03)  T(F): 98 (25 Apr 2025 13:12), Max: 98.4 (25 Apr 2025 00:03)  HR: 60 (25 Apr 2025 13:12) (60 - 69)  BP: 109/64 (25 Apr 2025 13:12) (101/54 - 139/66)  BP(mean): --  RR: 18 (25 Apr 2025 13:12) (14 - 19)  SpO2: 99% (25 Apr 2025 13:12) (93% - 100%)    Parameters below as of 25 Apr 2025 13:12  Patient On (Oxygen Delivery Method): room air      T(C): 36.7 (24 Apr 2025 07:10), Max: 36.7 (24 Apr 2025 07:10)  T(F): 98 (24 Apr 2025 07:10), Max: 98 (24 Apr 2025 07:10)  HR: 60 (24 Apr 2025 07:10) (51 - 60)  BP: 124/69 (24 Apr 2025 07:10) (110/57 - 144/72)  BP(mean): --  RR: 18 (24 Apr 2025 07:10) (17 - 18)  SpO2: 94% (24 Apr 2025 07:10) (94% - 100%)    Parameters below as of 24 Apr 2025 07:10  Patient On (Oxygen Delivery Method): room air      PHYSICAL EXAM:      GENERAL: Comfortable in bed, family members  present  HEAD: NCAT  EYES: open  NECK: veins  wnl  NERVOUS SYSTEM:  Alert & Oriented supine in bed  CHEST/LUNG: no 02, no  wheezes  HEART: Regular rate and rhythm; Left new pacer  site  clean  ABDOMEN: Soft, Nontender, +BS  EXTREMITIES: legs  same   : Neg      LABS:    04-27    127[L]  |  92[L]  |  47.9[H]  ----------------------------<  109[H]  5.3   |  21.0[L]  |  2.51[H]    Ca    7.8[L]      27 Apr 2025 06:00  Phos  3.9     04-26  Mg     2.4     04-26    TPro  6.0[L]  /  Alb  2.8[L]  /  TBili  0.3[L]  /  DBili  x   /  AST  14  /  ALT  <5  /  AlkPhos  118  04-26 04-25    128[L]  |  93[L]  |  42.4[H]  ----------------------------<  240[H]  4.8   |  19.0[L]  |  1.94[H]    Ca    8.1[L]      25 Apr 2025 06:20  Mg     2.3     04-25                            8.9    10.00 )-----------( 393      ( 24 Apr 2025 05:01 )             27.7     04-24    130[L]  |  93[L]  |  42.3[H]  ----------------------------<  196[H]  4.8   |  19.0[L]  |  1.96[H]    Ca    8.3[L]      24 Apr 2025 05:01      PT/INR - ( 24 Apr 2025 05:01 )   PT: 15.1 sec;   INR: 1.34 ratio         PTT - ( 24 Apr 2025 05:01 )  PTT:31.9 sec  Urinalysis Basic - ( 24 Apr 2025 05:01 )    Color: x / Appearance: x / SG: x / pH: x  Gluc: 196 mg/dL / Ketone: x  / Bili: x / Urobili: x   Blood: x / Protein: x / Nitrite: x   Leuk Esterase: x / RBC: x / WBC x   Sq Epi: x / Non Sq Epi: x / Bacteria: x              RADIOLOGY & ADDITIONAL TESTS:

## 2025-04-27 NOTE — PROGRESS NOTE ADULT - ASSESSMENT
73 y/o female with PMH of ICM/HFrEF (LVEF 35-40%), CAD s/p PCI, Nstemi.3/19/25 cath mid-distal LAD severe stenosis, OM1 and OM2 severe stenosis, mid ISR 50%, distal ISR 99%, s/p PCI with 1 ZEINAB to RCA.   Mobitz II s/p PPM (Nereyda, MDT), HTN, HLD, PAD with chronic right LE wound, DM2, and CKD3, who was BIBA from CHF clinic due to hypervolemia, weight gain of ~20 lbs in the past 9 days despite escalation of PO diuretics. (torsemide 40 bid)  4/10 she developed a fever, tmax 100.4F. RVP negative, empirically started on IV Zosyn.  4/13 s/p right knee fluid aspiration (s/p recent trauma/fall). 4/27 Cardiomens is rising 28 Bumex drip restarted      75 y/o female with PMH of ICM/HFrEF (LVEF 35-40%), CAD s/p PCI, Nstemi.3/19/25 cath mid-distal LAD severe stenosis, OM1 and OM2 severe stenosis, mid ISR 50%, distal ISR 99%, s/p PCI with 1 ZEINAB to RCA.   Mobitz II s/p PPM (Nereyda, MDT), s/p s/p uncomplicated CRT-D implant (Left bundle lead, RV ICD lead) 4/24/25,  HTN, HLD, PAD with chronic right LE wound, DM2, and CKD3, who was BIBA from CHF clinic due to hypervolemia, weight gain of ~20 lbs in the past 9 days despite escalation of PO diuretics. (torsemide 40 bid)  4/10 she developed a fever, tmax 100.4F. RVP negative, empirically started on IV Zosyn.  4/13 s/p right knee fluid aspiration (s/p recent trauma/fall). 4/27 Cardiomens is rising 28 Bumex drip restarted

## 2025-04-27 NOTE — PROGRESS NOTE ADULT - PROBLEM SELECTOR PLAN 3
creat stable  k mildy high may need to hold entresto/spirolactone if higher in the am      Cardiac meds  aspirin  chewable 81 milliGRAM(s) Oral daily  atorvastatin 80 milliGRAM(s) Oral at bedtime  buMETAnide Injectable 2 milliGRAM(s) IV Push two times a day  carvedilol 6.25 milliGRAM(s) Oral every 12 hours  hydrALAZINE 50 milliGRAM(s) Oral every 8 hours  isosorbide   mononitrate ER Tablet (IMDUR) 60 milliGRAM(s) Oral daily  sacubitril 97 mG/valsartan 103 mG 1 Tablet(s) Oral two times a day  spironolactone 25 milliGRAM(s) Oral daily  ticagrelor 90 milliGRAM(s) Oral every 12 hours

## 2025-04-28 LAB
ANION GAP SERPL CALC-SCNC: 13 MMOL/L — SIGNIFICANT CHANGE UP (ref 5–17)
BUN SERPL-MCNC: 52.6 MG/DL — HIGH (ref 8–20)
CALCIUM SERPL-MCNC: 8.1 MG/DL — LOW (ref 8.4–10.5)
CHLORIDE SERPL-SCNC: 93 MMOL/L — LOW (ref 96–108)
CO2 SERPL-SCNC: 24 MMOL/L — SIGNIFICANT CHANGE UP (ref 22–29)
CREAT SERPL-MCNC: 2.32 MG/DL — HIGH (ref 0.5–1.3)
EGFR: 22 ML/MIN/1.73M2 — LOW
EGFR: 22 ML/MIN/1.73M2 — LOW
FERRITIN SERPL-MCNC: 1288 NG/ML — HIGH (ref 13–330)
GLUCOSE BLDC GLUCOMTR-MCNC: 175 MG/DL — HIGH (ref 70–99)
GLUCOSE BLDC GLUCOMTR-MCNC: 179 MG/DL — HIGH (ref 70–99)
GLUCOSE BLDC GLUCOMTR-MCNC: 187 MG/DL — HIGH (ref 70–99)
GLUCOSE BLDC GLUCOMTR-MCNC: 231 MG/DL — HIGH (ref 70–99)
GLUCOSE SERPL-MCNC: 153 MG/DL — HIGH (ref 70–99)
HCT VFR BLD CALC: 26.9 % — LOW (ref 34.5–45)
HGB BLD-MCNC: 8.7 G/DL — LOW (ref 11.5–15.5)
IRON SATN MFR SERPL: 20 % — SIGNIFICANT CHANGE UP (ref 14–50)
IRON SATN MFR SERPL: 44 UG/DL — SIGNIFICANT CHANGE UP (ref 37–145)
MCHC RBC-ENTMCNC: 28.6 PG — SIGNIFICANT CHANGE UP (ref 27–34)
MCHC RBC-ENTMCNC: 32.3 G/DL — SIGNIFICANT CHANGE UP (ref 32–36)
MCV RBC AUTO: 88.5 FL — SIGNIFICANT CHANGE UP (ref 80–100)
NRBC # BLD AUTO: 0 K/UL — SIGNIFICANT CHANGE UP (ref 0–0)
NRBC # FLD: 0 K/UL — SIGNIFICANT CHANGE UP (ref 0–0)
NRBC BLD AUTO-RTO: 0 /100 WBCS — SIGNIFICANT CHANGE UP (ref 0–0)
NT-PROBNP SERPL-SCNC: HIGH PG/ML (ref 0–300)
PLATELET # BLD AUTO: 384 K/UL — SIGNIFICANT CHANGE UP (ref 150–400)
PMV BLD: 9.4 FL — SIGNIFICANT CHANGE UP (ref 7–13)
POTASSIUM SERPL-MCNC: 4.9 MMOL/L — SIGNIFICANT CHANGE UP (ref 3.5–5.3)
POTASSIUM SERPL-SCNC: 4.9 MMOL/L — SIGNIFICANT CHANGE UP (ref 3.5–5.3)
RBC # BLD: 3.04 M/UL — LOW (ref 3.8–5.2)
RBC # FLD: 19.3 % — HIGH (ref 10.3–14.5)
SODIUM SERPL-SCNC: 130 MMOL/L — LOW (ref 135–145)
TIBC SERPL-MCNC: 223 UG/DL — SIGNIFICANT CHANGE UP (ref 220–430)
TRANSFERRIN SERPL-MCNC: 156 MG/DL — LOW (ref 192–382)
WBC # BLD: 10.03 K/UL — SIGNIFICANT CHANGE UP (ref 3.8–10.5)
WBC # FLD AUTO: 10.03 K/UL — SIGNIFICANT CHANGE UP (ref 3.8–10.5)

## 2025-04-28 PROCEDURE — 99233 SBSQ HOSP IP/OBS HIGH 50: CPT

## 2025-04-28 PROCEDURE — 99232 SBSQ HOSP IP/OBS MODERATE 35: CPT

## 2025-04-28 RX ORDER — EPOETIN ALFA 10000 [IU]/ML
40000 SOLUTION INTRAVENOUS; SUBCUTANEOUS ONCE
Refills: 0 | Status: DISCONTINUED | OUTPATIENT
Start: 2025-04-28 | End: 2025-05-03

## 2025-04-28 RX ORDER — SODIUM CHLORIDE 3 G/100ML
150 INJECTION, SOLUTION INTRAVENOUS
Refills: 0 | Status: COMPLETED | OUTPATIENT
Start: 2025-04-28 | End: 2025-04-28

## 2025-04-28 RX ADMIN — TICAGRELOR 90 MILLIGRAM(S): 90 TABLET ORAL at 05:53

## 2025-04-28 RX ADMIN — CARVEDILOL 6.25 MILLIGRAM(S): 3.12 TABLET, FILM COATED ORAL at 16:37

## 2025-04-28 RX ADMIN — INSULIN GLARGINE-YFGN 5 UNIT(S): 100 INJECTION, SOLUTION SUBCUTANEOUS at 22:04

## 2025-04-28 RX ADMIN — Medication 975 MILLIGRAM(S): at 23:03

## 2025-04-28 RX ADMIN — Medication 40 MILLIGRAM(S): at 05:52

## 2025-04-28 RX ADMIN — Medication 975 MILLIGRAM(S): at 22:04

## 2025-04-28 RX ADMIN — CEPHALEXIN 250 MILLIGRAM(S): 250 CAPSULE ORAL at 05:56

## 2025-04-28 RX ADMIN — SACUBITRIL AND VALSARTAN 1 TABLET(S): 6; 6 PELLET ORAL at 05:52

## 2025-04-28 RX ADMIN — Medication 3 MILLIGRAM(S): at 22:04

## 2025-04-28 RX ADMIN — ISOSORBIDE MONONITRATE 60 MILLIGRAM(S): 60 TABLET, EXTENDED RELEASE ORAL at 08:23

## 2025-04-28 RX ADMIN — CEPHALEXIN 250 MILLIGRAM(S): 250 CAPSULE ORAL at 16:38

## 2025-04-28 RX ADMIN — CARVEDILOL 6.25 MILLIGRAM(S): 3.12 TABLET, FILM COATED ORAL at 05:53

## 2025-04-28 RX ADMIN — TICAGRELOR 90 MILLIGRAM(S): 90 TABLET ORAL at 16:38

## 2025-04-28 RX ADMIN — GABAPENTIN 400 MILLIGRAM(S): 400 CAPSULE ORAL at 08:23

## 2025-04-28 RX ADMIN — INSULIN LISPRO 2: 100 INJECTION, SOLUTION INTRAVENOUS; SUBCUTANEOUS at 17:26

## 2025-04-28 RX ADMIN — ATORVASTATIN CALCIUM 80 MILLIGRAM(S): 80 TABLET, FILM COATED ORAL at 22:03

## 2025-04-28 RX ADMIN — SODIUM CHLORIDE 300 MILLILITER(S): 3 INJECTION, SOLUTION INTRAVENOUS at 14:46

## 2025-04-28 RX ADMIN — Medication 50 MILLIGRAM(S): at 14:46

## 2025-04-28 RX ADMIN — Medication 1300 MILLIGRAM(S): at 05:52

## 2025-04-28 RX ADMIN — Medication 50 MILLIGRAM(S): at 05:53

## 2025-04-28 RX ADMIN — METHOCARBAMOL 500 MILLIGRAM(S): 500 TABLET, FILM COATED ORAL at 05:53

## 2025-04-28 RX ADMIN — Medication 1300 MILLIGRAM(S): at 22:04

## 2025-04-28 RX ADMIN — Medication 1300 MILLIGRAM(S): at 14:46

## 2025-04-28 RX ADMIN — Medication 975 MILLIGRAM(S): at 14:46

## 2025-04-28 RX ADMIN — Medication 975 MILLIGRAM(S): at 05:52

## 2025-04-28 RX ADMIN — INSULIN LISPRO 2: 100 INJECTION, SOLUTION INTRAVENOUS; SUBCUTANEOUS at 08:25

## 2025-04-28 RX ADMIN — Medication 81 MILLIGRAM(S): at 08:23

## 2025-04-28 RX ADMIN — METHOCARBAMOL 500 MILLIGRAM(S): 500 TABLET, FILM COATED ORAL at 16:37

## 2025-04-28 RX ADMIN — SACUBITRIL AND VALSARTAN 1 TABLET(S): 6; 6 PELLET ORAL at 16:37

## 2025-04-28 RX ADMIN — INSULIN LISPRO 2: 100 INJECTION, SOLUTION INTRAVENOUS; SUBCUTANEOUS at 12:17

## 2025-04-28 NOTE — PROGRESS NOTE ADULT - ASSESSMENT
Gen Cards: Dr. Allen  HF: Dr. Polanco  IC: Dr. Bajwa    75 y/o female with PMH of ICM/HFrEF (LVEF 35-40%), CAD s/p PCI, Mobitz II s/p PPM (Nereyda, MDT), HTN, HLD, PAD with chronic right LE wound, DM2, and CKD3, who was BIBA from CHF clinic due to hypervolemia, weight gain of ~20 lbs in the past 9 days despite escalation of PO diuretics.      She was recently admitted on 3/19/25 with c/o CP and found to have NSTEMI. She underwent LHC on 3/21 which showed mid-distal LAD severe stenosis, OM1 and OM2 severe stenosis, mid ISR 50%, distal ISR 99%, s/p PCI with 1 ZEINAB to RCA.  On 3/22 she had a fever with leukocytosis and her blood cultures were positive for E. Coli/ESBL. Her CT A/P showed left pyelonephritis. She also had an BRADY for which her Entresto was held. She was discharged home 3/31 and her CardioMEMS was not at goal and had been rising. She was advised to increase Torsemide 40 mg daily to twice daily on 4/4. Despite this, she has continued to gain weight and reports poor UOP response.     Admission labwork: Na 125, K 4.6, BUN 37.7, Cr 1.94, eGFR 27, Mg 2.3, proBNP [id number]!  CXR: increased interstitial markings bilaterally, cardiomegaly.   On 4/10 she developed a fever, tmax 100.4F. RVP negative, empirically started on IV Zosyn.  4/13 s/p right knee fluid aspiration (s/p recent trauma/fall).  4/15 initiated on continuous Bumex infusion for inadequate response to intermittent dosing.  4/21: Received 1 dose of metolazone 5 mg and Bumex gtt switched to 4 mg IV BID.  4/24: s/p CRT-D    Prior Cardiac Testing:  RHC 4/22/25: RA 18, RV 63/18, PA 63/25/37, PCW 22, PA sat 37%, Jose CO/CI 2.5/1.5, Thermo CO 2.5, SVR 2800 dsc, PVR 4.5 WILLARD. No CardioMEMS recalibration needed.     TTE 4/21/25: LVEF 20-25%, LVIDd 4.3 cm, LVOT VTI 12.6 cm, grade 2 DD, mild RVE with mild RV dysfunction, mild-mod AS, mod MR/TR, mild MI, est PASP 57 mmHg, est RAP 8 mmHg.    TTE 3/21/25: LVEF 25-30%, grade II DD, normal RV size/function, severe LAE, moderate MR, mild TR, PASP 58 mmHg ( RAP 8 mmHg), no evidence of LV thrombus, multiple segmental abnormalities/regional WMA.    CardioMEMS PAD (goal 14):  4/28: 26  4/25: 24  4/22: 29-30  4/20: 31  4/18: 27 4/17: 27 (in chair ~30 degrees).  4/14: 30-33  4/11: 32  4/9: 32  3/27: 26  3/24: 23  3/21: 22

## 2025-04-28 NOTE — PROGRESS NOTE ADULT - SUBJECTIVE AND OBJECTIVE BOX
NEPHROLOGY INTERVAL HPI/OVERNIGHT EVENTS:    No new events.    MEDICATIONS  (STANDING):  acetaminophen     Tablet .. 975 milliGRAM(s) Oral every 8 hours  aspirin  chewable 81 milliGRAM(s) Oral daily  atorvastatin 80 milliGRAM(s) Oral at bedtime  carvedilol 6.25 milliGRAM(s) Oral every 12 hours  dextrose 5%. 1000 milliLiter(s) (100 mL/Hr) IV Continuous <Continuous>  dextrose 5%. 1000 milliLiter(s) (50 mL/Hr) IV Continuous <Continuous>  dextrose 50% Injectable 25 Gram(s) IV Push once  dextrose 50% Injectable 12.5 Gram(s) IV Push once  dextrose 50% Injectable 25 Gram(s) IV Push once  gabapentin 400 milliGRAM(s) Oral daily  glucagon  Injectable 1 milliGRAM(s) IntraMuscular once  hydrALAZINE 50 milliGRAM(s) Oral three times a day  insulin glargine Injectable (LANTUS) 5 Unit(s) SubCutaneous at bedtime  insulin lispro (ADMELOG) corrective regimen sliding scale   SubCutaneous three times a day before meals  isosorbide   mononitrate ER Tablet (IMDUR) 60 milliGRAM(s) Oral daily  lidocaine   4% Patch 1 Patch Transdermal every 24 hours  melatonin 3 milliGRAM(s) Oral at bedtime  methocarbamol 500 milliGRAM(s) Oral two times a day  pantoprazole    Tablet 40 milliGRAM(s) Oral before breakfast  sacubitril 97 mG/valsartan 103 mG 1 Tablet(s) Oral two times a day  sertraline 25 milliGRAM(s) Oral daily  spironolactone 25 milliGRAM(s) Oral daily  ticagrelor 90 milliGRAM(s) Oral every 12 hours    MEDICATIONS  (PRN):  aluminum hydroxide/magnesium hydroxide/simethicone Suspension 30 milliLiter(s) Oral every 4 hours PRN Dyspepsia  dextrose Oral Gel 15 Gram(s) Oral once PRN Blood Glucose LESS THAN 70 milliGRAM(s)/deciliter  loperamide 2 milliGRAM(s) Oral two times a day PRN Diarrhea      Allergies    No Known Allergies        Vital Signs Last 24 Hrs  T(C): 36.6 (28 Apr 2025 07:41), Max: 36.8 (28 Apr 2025 05:48)  T(F): 97.9 (28 Apr 2025 07:41), Max: 98.3 (28 Apr 2025 05:48)  HR: 70 (28 Apr 2025 08:32) (69 - 83)  BP: 116/60 (28 Apr 2025 08:32) (92/51 - 129/62)  BP(mean): --  RR: 19 (28 Apr 2025 07:41) (18 - 19)  SpO2: 100% (28 Apr 2025 07:41) (99% - 100%)    Parameters below as of 28 Apr 2025 07:41  Patient On (Oxygen Delivery Method): room air      T(C): 36.6 (27 Apr 2025 07:15), Max: 36.9 (26 Apr 2025 16:23)  T(F): 97.9 (27 Apr 2025 07:15), Max: 98.5 (26 Apr 2025 16:23)  HR: 83 (27 Apr 2025 14:15) (62 - 83)  BP: 128/72 (27 Apr 2025 14:15) (97/57 - 132/71)  BP(mean): --  RR: 18 (27 Apr 2025 09:47) (18 - 19)  SpO2: 97% (27 Apr 2025 09:47) (94% - 98%)    Parameters below as of 27 Apr 2025 08:42  Patient On (Oxygen Delivery Method): room air      T(C): 36.7 (25 Apr 2025 13:12), Max: 36.9 (25 Apr 2025 00:03)  T(F): 98 (25 Apr 2025 13:12), Max: 98.4 (25 Apr 2025 00:03)  HR: 60 (25 Apr 2025 13:12) (60 - 69)  BP: 109/64 (25 Apr 2025 13:12) (101/54 - 139/66)  BP(mean): --  RR: 18 (25 Apr 2025 13:12) (14 - 19)  SpO2: 99% (25 Apr 2025 13:12) (93% - 100%)    Parameters below as of 25 Apr 2025 13:12  Patient On (Oxygen Delivery Method): room air      T(C): 36.7 (24 Apr 2025 07:10), Max: 36.7 (24 Apr 2025 07:10)  T(F): 98 (24 Apr 2025 07:10), Max: 98 (24 Apr 2025 07:10)  HR: 60 (24 Apr 2025 07:10) (51 - 60)  BP: 124/69 (24 Apr 2025 07:10) (110/57 - 144/72)  BP(mean): --  RR: 18 (24 Apr 2025 07:10) (17 - 18)  SpO2: 94% (24 Apr 2025 07:10) (94% - 100%)    Parameters below as of 24 Apr 2025 07:10  Patient On (Oxygen Delivery Method): room air      PHYSICAL EXAM:      GENERAL: Comfortable in bed, several  family members  present  HEAD: NCAT  EYES: open  NECK: veins  wnl  NERVOUS SYSTEM:  Alert & Oriented supine in bed  CHEST/LUNG: no 02, no  wheezes  HEART: Regular rate and rhythm; Left new pacer  site  clean  ABDOMEN: Soft, Nontender, +BS  EXTREMITIES: legs  same   : Neg      LABS:    04-28    130[L]  |  93[L]  |  52.6[H]  ----------------------------<  153[H]  4.9   |  24.0  |  2.32[H]    Ca    8.1[L]      28 Apr 2025 06:00        04-27    127[L]  |  92[L]  |  47.9[H]  ----------------------------<  109[H]  5.3   |  21.0[L]  |  2.51[H]    Ca    7.8[L]      27 Apr 2025 06:00  Phos  3.9     04-26  Mg     2.4     04-26    TPro  6.0[L]  /  Alb  2.8[L]  /  TBili  0.3[L]  /  DBili  x   /  AST  14  /  ALT  <5  /  AlkPhos  118  04-26 04-25    128[L]  |  93[L]  |  42.4[H]  ----------------------------<  240[H]  4.8   |  19.0[L]  |  1.94[H]    Ca    8.1[L]      25 Apr 2025 06:20  Mg     2.3     04-25                            8.9    10.00 )-----------( 393      ( 24 Apr 2025 05:01 )             27.7     04-24    130[L]  |  93[L]  |  42.3[H]  ----------------------------<  196[H]  4.8   |  19.0[L]  |  1.96[H]    Ca    8.3[L]      24 Apr 2025 05:01      PT/INR - ( 24 Apr 2025 05:01 )   PT: 15.1 sec;   INR: 1.34 ratio         PTT - ( 24 Apr 2025 05:01 )  PTT:31.9 sec  Urinalysis Basic - ( 24 Apr 2025 05:01 )    Color: x / Appearance: x / SG: x / pH: x  Gluc: 196 mg/dL / Ketone: x  / Bili: x / Urobili: x   Blood: x / Protein: x / Nitrite: x   Leuk Esterase: x / RBC: x / WBC x   Sq Epi: x / Non Sq Epi: x / Bacteria: x              RADIOLOGY & ADDITIONAL TESTS:

## 2025-04-28 NOTE — PROGRESS NOTE ADULT - ASSESSMENT
A) Anemia with low  t-sat, CRF, Cardiomyopathy with pacer; AGMA; secondary  hyperparathyroid. Dilutional  hyponatremia better with  drip.    P) Epogen  added.

## 2025-04-28 NOTE — PROGRESS NOTE ADULT - SUBJECTIVE AND OBJECTIVE BOX
Mariela Nelson MD (Available on depict)  [facility] Division of Hospital Medicine    Chief Complaint:  Sob    SUBJECTIVE / OVERNIGHT EVENTS:  Pt seen and examined at bedside. Denies any complaints. Son at bedside.     Patient denies chest pain, SOB, abd pain, N/V, fever, chills, dysuria or any other complaints. All remainder ROS negative.     INTERVAL EVENTS:  -Pt hemodynamically stable with no overnight events.   -On bumex drip. To be evaluated by HF team today.   -Cr- improved 2.32<<2.5<<2.3    MEDICATIONS  (STANDING):  acetaminophen     Tablet .. 975 milliGRAM(s) Oral every 8 hours  aspirin  chewable 81 milliGRAM(s) Oral daily  atorvastatin 80 milliGRAM(s) Oral at bedtime  buMETAnide Infusion 0.5 mG/Hr (2.5 mL/Hr) IV Continuous <Continuous>  carvedilol 6.25 milliGRAM(s) Oral every 12 hours  cephalexin 250 milliGRAM(s) Oral every 12 hours  dextrose 5%. 1000 milliLiter(s) (100 mL/Hr) IV Continuous <Continuous>  dextrose 5%. 1000 milliLiter(s) (50 mL/Hr) IV Continuous <Continuous>  dextrose 50% Injectable 25 Gram(s) IV Push once  dextrose 50% Injectable 12.5 Gram(s) IV Push once  dextrose 50% Injectable 25 Gram(s) IV Push once  gabapentin 400 milliGRAM(s) Oral daily  glucagon  Injectable 1 milliGRAM(s) IntraMuscular once  hydrALAZINE 50 milliGRAM(s) Oral every 8 hours  insulin glargine Injectable (LANTUS) 5 Unit(s) SubCutaneous at bedtime  insulin lispro (ADMELOG) corrective regimen sliding scale   SubCutaneous three times a day before meals  isosorbide   mononitrate ER Tablet (IMDUR) 60 milliGRAM(s) Oral daily  lidocaine   4% Patch 1 Patch Transdermal every 24 hours  melatonin 3 milliGRAM(s) Oral at bedtime  methocarbamol 500 milliGRAM(s) Oral two times a day  pantoprazole    Tablet 40 milliGRAM(s) Oral before breakfast  sacubitril 97 mG/valsartan 103 mG 1 Tablet(s) Oral two times a day  sodium bicarbonate 1300 milliGRAM(s) Oral three times a day  ticagrelor 90 milliGRAM(s) Oral every 12 hours    MEDICATIONS  (PRN):  aluminum hydroxide/magnesium hydroxide/simethicone Suspension 30 milliLiter(s) Oral every 4 hours PRN Dyspepsia  benzocaine/menthol Lozenge 1 Lozenge Oral every 2 hours PRN Sore Throat  dextrose Oral Gel 15 Gram(s) Oral once PRN Blood Glucose LESS THAN 70 milliGRAM(s)/deciliter  loperamide 2 milliGRAM(s) Oral two times a day PRN Diarrhea  ondansetron Injectable 4 milliGRAM(s) IV Push every 6 hours PRN Nausea and/or Vomiting      I&O's Summary    27 Apr 2025 07:01  -  28 Apr 2025 07:00  --------------------------------------------------------  IN: 1200 mL / OUT: 2250 mL / NET: -1050 mL    28 Apr 2025 07:01  -  28 Apr 2025 11:04  --------------------------------------------------------  IN: 222 mL / OUT: 300 mL / NET: -78 mL        PHYSICAL EXAM:  Vital Signs Last 24 Hrs  T(C): 36.6 (28 Apr 2025 07:41), Max: 36.8 (28 Apr 2025 05:48)  T(F): 97.9 (28 Apr 2025 07:41), Max: 98.3 (28 Apr 2025 05:48)  HR: 70 (28 Apr 2025 08:32) (69 - 83)  BP: 116/60 (28 Apr 2025 08:32) (92/51 - 129/62)  BP(mean): --  RR: 19 (28 Apr 2025 07:41) (18 - 19)  SpO2: 100% (28 Apr 2025 07:41) (99% - 100%)    Parameters below as of 28 Apr 2025 07:41  Patient On (Oxygen Delivery Method): room air        Pt lying in bed in no acute distress  Moist Mucus membranes  S1/S2 regular, no murmurs  Reduced breath sounds lung bases   Abdomen soft, non-tender, BS+  No LE edema       LABS:                        8.7    10.03 )-----------( 384      ( 28 Apr 2025 06:00 )             26.9     04-28    130[L]  |  93[L]  |  52.6[H]  ----------------------------<  153[H]  4.9   |  24.0  |  2.32[H]    Ca    8.1[L]      28 Apr 2025 06:00            Urinalysis Basic - ( 28 Apr 2025 06:00 )    Color: x / Appearance: x / SG: x / pH: x  Gluc: 153 mg/dL / Ketone: x  / Bili: x / Urobili: x   Blood: x / Protein: x / Nitrite: x   Leuk Esterase: x / RBC: x / WBC x   Sq Epi: x / Non Sq Epi: x / Bacteria: x        Culture - Urine (collected 26 Apr 2025 06:58)  Source: Clean Catch Clean Catch (Midstream)  Preliminary Report (27 Apr 2025 22:32):    10,000 - 49,000 CFU/mL Escherichia coli      CAPILLARY BLOOD GLUCOSE      POCT Blood Glucose.: 179 mg/dL (28 Apr 2025 07:41)  POCT Blood Glucose.: 163 mg/dL (27 Apr 2025 21:08)  POCT Blood Glucose.: 212 mg/dL (27 Apr 2025 17:01)  POCT Blood Glucose.: 200 mg/dL (27 Apr 2025 12:10)        RADIOLOGY & ADDITIONAL TESTS:  Results Reviewed:   Imaging Personally Reviewed:  Electrocardiogram Personally Reviewed:

## 2025-04-28 NOTE — PROGRESS NOTE ADULT - PROBLEM SELECTOR PLAN 1
- ICM, most recent TTE on 4/21/25 showed further decline of LVEF to 20-25%  - Clinically appears hypervolemic on exam w/warm extremities.  - Serum pro-BNP > 70k on 4/21. Repeat pro-BNP 43k on 4/28.  - Hyponatremia improving with diuresis.  - Diuretics: Continue Bumex infusion at 0.5 mg/hr (started 4/27) and will give another dose of hypertonic saline 2% bolus (150 mL over 30 minutes).  - GDMT: Continue Coreg 6.25 mg BID, Entresto  mg BID, and hydralazine 50 mg TID. Aldactone stopped for hyperkalemia. When euvolemic, will wean hydralazine and increase uptitrate Coreg. Continue Imdur 60 mg daily for anginal pain. No Farxiga 2/2 h/o pyelonephritis.   - Device: h/o Micra PPM with chronic . She is now s/p CRT-D on 4/24.  - Low Na, 1.5L FR diet recommended  - Please document strict I&Os and daily standing weight. - ICM, most recent TTE on 4/21/25 showed further decline of LVEF to 20-25%  - Clinically appears hypervolemic on exam w/warm extremities.  - Serum pro-BNP > 70k on 4/21. Repeat pro-BNP 43k on 4/28.  - Hyponatremia improving with diuresis.  - Diuretics: Continue Bumex infusion at 0.5 mg/hr (started 4/27) and will give another dose of hypertonic saline 2% bolus (150 mL over 30 minutes).  - GDMT: Continue Coreg 6.25 mg BID, Entresto  mg BID, and hydralazine 50 mg TID. Aldactone stopped for hyperkalemia. When euvolemic, will wean hydralazine and increase uptitrate Coreg. Continue Imdur 60 mg daily for anginal pain. No Farxiga 2/2 h/o pyelonephritis.   - Device: h/o Micra PPM with chronic . She is now s/p CRT-D on 4/24.  - Low Na, 1.5L FR diet recommended  - Please document strict I&Os and daily standing weight.  - Needs daily PT. Patient has not tried ambulating in days. Very deconditioned.

## 2025-04-28 NOTE — PROGRESS NOTE ADULT - ASSESSMENT
74yoF with a history of coronary artery disease, heart failure, chronic kidney disease, hypertension, diabetes, and anemia who was recently hospitalized for NSTEMI with PCI and bacteremia who presented with dyspnea on exertion and weight gain. Continues on Bumex drip as per cardiology additional diuretics with metolazone / Bumex IV given 4/21. s/p repeat heart cath 4/22 with no need for MEMS recalibration however plan for upgarde to CRT-P vs CRT-D device 4/24, s/p Cardiac MR. Diuresis resumed by HF team 4/25, continue to monitor.     #Acute on chronic systolic heart failure  - Recent echocardiogram noted ejection fraction of 31% with multiple segmental abnormalities  - Diuresis as per HF guidance, started on bumex drip. HF to follow  - s/p Metolazone 4/21, with Bumex IV push   - Strict I/O, daily weights  - On metoprolol, Imdur 60 mg daily, spironolactone, Entresto full dose, hydralazine 50mg TID   - Heart Failure on board recs appreciated, s/p PPM device upgrade 4/24 without issue, functioning well now adjusting diuresis, recheck cardiomems this AM (4/27)  - Given patients recurrent UTI's / history of bacteremia, EP requesting ID input for potential abx's prior to device upgrade / implant, consulted, recs appreciated, no rec for prophylactic abx/ Pt on Keflex s/p CRT-D for 5 days   - s/p repeat Heart cath 4/22, no recalibration of Mems needed     #Chronic kidney disease  - Continue to monitor while on diuresis  - Nephro on board, will f/u recs    #Right knee pain  - knee xray showing effusion, possible in setting of fall  - c/w current pain regimen, adjust as needed   - CT knee result noted  - ortho consulted, sp aspiration fluid neg for crystals/infections  - ROM improving     #Fever  #UTI  resolved   s/p Abxs with Zosyn    #Diarrhea  resolved   - possibly abx related  - GI pcr neg, PRN imodium started    #Coronary artery disease  - Continue on aspirin, ticagrelor, and atorvastatin  - Recent cardiac catheterization with PCI, Brillinta dosage increased to 90 BID from 60 as per HF  - initial trop 140 -> 118  - pt with recurrent chest pain, high up in chest, trop downtrended   - c/w maalox, pain improves with GI cocktail, as above consideration for repeat heart cath this admission     #Hyponatremia  - Hypervolemic on presentation  - Na improving, On Bumex drip. Renal on board     #Diabetes  - Insulin coverage  - Close monitoring of blood glucose levels  - Gabapentin for neuropathy  - need Glucometer on discharge     DVT ppx: ASA/Brilinta     Dispo: On Bumex drip. Medically active for now

## 2025-04-28 NOTE — PROGRESS NOTE ADULT - PROBLEM SELECTOR PLAN 3
- Baseline Cr ~1.6-1.7?  - Renal function now improving on Bumex gtt  - Avoid nephrotoxics  - Nephrology following.

## 2025-04-28 NOTE — PROGRESS NOTE ADULT - SUBJECTIVE AND OBJECTIVE BOX
ADVANCED HEART FAILURE - PROGRESS NOTE  [address] [place] [address]  Office Phone: [phone]/Fax: [phone]  Service/On Call Phone [phone]  _______________________________________________________________________________________________________    Subjective:  - NAEO  - Patient resting comfortably in bed with no complaints. She denies any SOB at rest, orthopnea, PND, CP, palpitations, LH/dizziness. She has not tried ambulating.    Medications:  acetaminophen     Tablet .. 975 milliGRAM(s) Oral every 8 hours  aluminum hydroxide/magnesium hydroxide/simethicone Suspension 30 milliLiter(s) Oral every 4 hours PRN  aspirin  chewable 81 milliGRAM(s) Oral daily  atorvastatin 80 milliGRAM(s) Oral at bedtime  benzocaine/menthol Lozenge 1 Lozenge Oral every 2 hours PRN  buMETAnide Infusion 0.5 mG/Hr IV Continuous <Continuous>  carvedilol 6.25 milliGRAM(s) Oral every 12 hours  cephalexin 250 milliGRAM(s) Oral every 12 hours  dextrose 5%. 1000 milliLiter(s) IV Continuous <Continuous>  dextrose 5%. 1000 milliLiter(s) IV Continuous <Continuous>  dextrose 50% Injectable 25 Gram(s) IV Push once  dextrose 50% Injectable 12.5 Gram(s) IV Push once  dextrose 50% Injectable 25 Gram(s) IV Push once  dextrose Oral Gel 15 Gram(s) Oral once PRN  epoetin kristina-epbx (RETACRIT) Injectable 63768 Unit(s) SubCutaneous once  gabapentin 400 milliGRAM(s) Oral daily  glucagon  Injectable 1 milliGRAM(s) IntraMuscular once  hydrALAZINE 50 milliGRAM(s) Oral every 8 hours  insulin glargine Injectable (LANTUS) 5 Unit(s) SubCutaneous at bedtime  insulin lispro (ADMELOG) corrective regimen sliding scale   SubCutaneous three times a day before meals  isosorbide   mononitrate ER Tablet (IMDUR) 60 milliGRAM(s) Oral daily  lidocaine   4% Patch 1 Patch Transdermal every 24 hours  loperamide 2 milliGRAM(s) Oral two times a day PRN  melatonin 3 milliGRAM(s) Oral at bedtime  methocarbamol 500 milliGRAM(s) Oral two times a day  ondansetron Injectable 4 milliGRAM(s) IV Push every 6 hours PRN  pantoprazole    Tablet 40 milliGRAM(s) Oral before breakfast  sacubitril 97 mG/valsartan 103 mG 1 Tablet(s) Oral two times a day  sodium bicarbonate 1300 milliGRAM(s) Oral three times a day  ticagrelor 90 milliGRAM(s) Oral every 12 hours      ICU Vital Signs Last 24 Hrs  T(C): 36.6, Max: 36.8 ( @ 05:48)  HR: 70 (69 - 83)  BP: 116/60 (92/51 - 129/62)  BP(mean): --  ABP: --  ABP(mean): --  RR: 19 (18 - 19)  SpO2: 100% (99% - 100%)    Weight in k.8 (25)  Weight in k.2 (25)      I&O's Summary Last 24 Hrs    IN: 1200 mL / OUT: 2250 mL / NET: -1050 mL      Tele: Paced in the 60s    Physical Exam:    General: No distress. Comfortable.  Neck: JVP elevated.  Respiratory: Clear to auscultation bilaterally  CV: RRR. Normal S1 and S2. No murmurs, rub, or gallops. Radial pulses normal.  Abdomen: Soft, non-distended, non-tender  Extremities: Warm, no edema  Neurology: Non-focal, alert and oriented times three.   Psych: Affect normal    Labs:    ( 25 @ 06:00 )               8.7    10.03 )--------( 384                  26.9     ( 25 @ 06:00 )     130  |  93  |  52.6  ---------------------<  153  4.9  |  24.0  |  2.32    Ca 8.1  Phos x   Mg x     ( 25 @ 05:10 )  TPro  6.0  /  Alb  2.8  /  TBili  0.3  /  DBili  x   /  AST  14  /  ALT  <5  /  AlkPhos  118    ( 25 @ 16:15 )  TropHS 118   / CK x     / CKMB x      ( 25 @ 10:04 )  TropHS 140   / CK x     / CKMB x

## 2025-04-29 LAB
-  AMPICILLIN/SULBACTAM: SIGNIFICANT CHANGE UP
-  AMPICILLIN: SIGNIFICANT CHANGE UP
-  AZTREONAM: SIGNIFICANT CHANGE UP
-  CEFAZOLIN: SIGNIFICANT CHANGE UP
-  CEFEPIME: SIGNIFICANT CHANGE UP
-  CEFTRIAXONE: SIGNIFICANT CHANGE UP
-  CEFUROXIME: SIGNIFICANT CHANGE UP
-  CIPROFLOXACIN: SIGNIFICANT CHANGE UP
-  ERTAPENEM: SIGNIFICANT CHANGE UP
-  GENTAMICIN: SIGNIFICANT CHANGE UP
-  IMIPENEM: SIGNIFICANT CHANGE UP
-  LEVOFLOXACIN: SIGNIFICANT CHANGE UP
-  MEROPENEM: SIGNIFICANT CHANGE UP
-  NITROFURANTOIN: SIGNIFICANT CHANGE UP
-  PIPERACILLIN/TAZOBACTAM: SIGNIFICANT CHANGE UP
-  TOBRAMYCIN: SIGNIFICANT CHANGE UP
-  TRIMETHOPRIM/SULFAMETHOXAZOLE: SIGNIFICANT CHANGE UP
ALBUMIN SERPL ELPH-MCNC: 3.1 G/DL — LOW (ref 3.3–5.2)
ALP SERPL-CCNC: 118 U/L — SIGNIFICANT CHANGE UP (ref 40–120)
ALT FLD-CCNC: <5 U/L — SIGNIFICANT CHANGE UP
ANION GAP SERPL CALC-SCNC: 16 MMOL/L — SIGNIFICANT CHANGE UP (ref 5–17)
ANION GAP SERPL CALC-SCNC: 16 MMOL/L — SIGNIFICANT CHANGE UP (ref 5–17)
AST SERPL-CCNC: 15 U/L — SIGNIFICANT CHANGE UP
BILIRUB SERPL-MCNC: 0.6 MG/DL — SIGNIFICANT CHANGE UP (ref 0.4–2)
BUN SERPL-MCNC: 48.6 MG/DL — HIGH (ref 8–20)
BUN SERPL-MCNC: 48.6 MG/DL — HIGH (ref 8–20)
CALCIUM SERPL-MCNC: 7.9 MG/DL — LOW (ref 8.4–10.5)
CALCIUM SERPL-MCNC: 8.2 MG/DL — LOW (ref 8.4–10.5)
CHLORIDE SERPL-SCNC: 90 MMOL/L — LOW (ref 96–108)
CHLORIDE SERPL-SCNC: 92 MMOL/L — LOW (ref 96–108)
CO2 SERPL-SCNC: 24 MMOL/L — SIGNIFICANT CHANGE UP (ref 22–29)
CO2 SERPL-SCNC: 24 MMOL/L — SIGNIFICANT CHANGE UP (ref 22–29)
CREAT SERPL-MCNC: 2.18 MG/DL — HIGH (ref 0.5–1.3)
CREAT SERPL-MCNC: 2.19 MG/DL — HIGH (ref 0.5–1.3)
CULTURE RESULTS: ABNORMAL
EGFR: 23 ML/MIN/1.73M2 — LOW
GLUCOSE BLDC GLUCOMTR-MCNC: 180 MG/DL — HIGH (ref 70–99)
GLUCOSE BLDC GLUCOMTR-MCNC: 186 MG/DL — HIGH (ref 70–99)
GLUCOSE BLDC GLUCOMTR-MCNC: 214 MG/DL — HIGH (ref 70–99)
GLUCOSE BLDC GLUCOMTR-MCNC: 233 MG/DL — HIGH (ref 70–99)
GLUCOSE SERPL-MCNC: 155 MG/DL — HIGH (ref 70–99)
GLUCOSE SERPL-MCNC: 171 MG/DL — HIGH (ref 70–99)
HCT VFR BLD CALC: 27.4 % — LOW (ref 34.5–45)
HGB BLD-MCNC: 8.8 G/DL — LOW (ref 11.5–15.5)
LACTATE SERPL-SCNC: 1.1 MMOL/L — SIGNIFICANT CHANGE UP (ref 0.5–2)
MAGNESIUM SERPL-MCNC: 2.3 MG/DL — SIGNIFICANT CHANGE UP (ref 1.6–2.6)
MCHC RBC-ENTMCNC: 28.7 PG — SIGNIFICANT CHANGE UP (ref 27–34)
MCHC RBC-ENTMCNC: 32.1 G/DL — SIGNIFICANT CHANGE UP (ref 32–36)
MCV RBC AUTO: 89.3 FL — SIGNIFICANT CHANGE UP (ref 80–100)
METHOD TYPE: SIGNIFICANT CHANGE UP
NRBC # BLD AUTO: 0 K/UL — SIGNIFICANT CHANGE UP (ref 0–0)
NRBC # FLD: 0 K/UL — SIGNIFICANT CHANGE UP (ref 0–0)
NRBC BLD AUTO-RTO: 0 /100 WBCS — SIGNIFICANT CHANGE UP (ref 0–0)
ORGANISM # SPEC MICROSCOPIC CNT: ABNORMAL
ORGANISM # SPEC MICROSCOPIC CNT: SIGNIFICANT CHANGE UP
PLATELET # BLD AUTO: 399 K/UL — SIGNIFICANT CHANGE UP (ref 150–400)
PMV BLD: 9.5 FL — SIGNIFICANT CHANGE UP (ref 7–13)
POTASSIUM SERPL-MCNC: 4.2 MMOL/L — SIGNIFICANT CHANGE UP (ref 3.5–5.3)
POTASSIUM SERPL-MCNC: 4.5 MMOL/L — SIGNIFICANT CHANGE UP (ref 3.5–5.3)
POTASSIUM SERPL-SCNC: 4.2 MMOL/L — SIGNIFICANT CHANGE UP (ref 3.5–5.3)
POTASSIUM SERPL-SCNC: 4.5 MMOL/L — SIGNIFICANT CHANGE UP (ref 3.5–5.3)
PROT SERPL-MCNC: 6.4 G/DL — LOW (ref 6.6–8.7)
RBC # BLD: 3.07 M/UL — LOW (ref 3.8–5.2)
RBC # FLD: 19.5 % — HIGH (ref 10.3–14.5)
SODIUM SERPL-SCNC: 130 MMOL/L — LOW (ref 135–145)
SODIUM SERPL-SCNC: 132 MMOL/L — LOW (ref 135–145)
SPECIMEN SOURCE: SIGNIFICANT CHANGE UP
WBC # BLD: 8.75 K/UL — SIGNIFICANT CHANGE UP (ref 3.8–10.5)
WBC # FLD AUTO: 8.75 K/UL — SIGNIFICANT CHANGE UP (ref 3.8–10.5)

## 2025-04-29 PROCEDURE — 99232 SBSQ HOSP IP/OBS MODERATE 35: CPT

## 2025-04-29 PROCEDURE — 99233 SBSQ HOSP IP/OBS HIGH 50: CPT

## 2025-04-29 RX ORDER — BUMETANIDE 1 MG/1
1 TABLET ORAL
Qty: 20 | Refills: 0 | Status: DISCONTINUED | OUTPATIENT
Start: 2025-04-29 | End: 2025-05-02

## 2025-04-29 RX ORDER — SODIUM CHLORIDE 0.65 %
1 AEROSOL, SPRAY (ML) NASAL
Refills: 0 | Status: DISCONTINUED | OUTPATIENT
Start: 2025-04-29 | End: 2025-05-09

## 2025-04-29 RX ORDER — BUMETANIDE 1 MG/1
2 TABLET ORAL ONCE
Refills: 0 | Status: COMPLETED | OUTPATIENT
Start: 2025-04-29 | End: 2025-04-29

## 2025-04-29 RX ORDER — SODIUM CHLORIDE 3 G/100ML
150 INJECTION, SOLUTION INTRAVENOUS
Refills: 0 | Status: COMPLETED | OUTPATIENT
Start: 2025-04-29 | End: 2025-04-29

## 2025-04-29 RX ADMIN — Medication 81 MILLIGRAM(S): at 09:49

## 2025-04-29 RX ADMIN — Medication 975 MILLIGRAM(S): at 12:20

## 2025-04-29 RX ADMIN — Medication 50 MILLIGRAM(S): at 06:20

## 2025-04-29 RX ADMIN — Medication 1300 MILLIGRAM(S): at 12:14

## 2025-04-29 RX ADMIN — Medication 975 MILLIGRAM(S): at 21:23

## 2025-04-29 RX ADMIN — METHOCARBAMOL 500 MILLIGRAM(S): 500 TABLET, FILM COATED ORAL at 17:17

## 2025-04-29 RX ADMIN — Medication 50 MILLIGRAM(S): at 21:23

## 2025-04-29 RX ADMIN — CARVEDILOL 6.25 MILLIGRAM(S): 3.12 TABLET, FILM COATED ORAL at 17:17

## 2025-04-29 RX ADMIN — SODIUM CHLORIDE 300 MILLILITER(S): 3 INJECTION, SOLUTION INTRAVENOUS at 12:17

## 2025-04-29 RX ADMIN — INSULIN LISPRO 2: 100 INJECTION, SOLUTION INTRAVENOUS; SUBCUTANEOUS at 12:20

## 2025-04-29 RX ADMIN — Medication 975 MILLIGRAM(S): at 06:20

## 2025-04-29 RX ADMIN — Medication 975 MILLIGRAM(S): at 07:20

## 2025-04-29 RX ADMIN — SACUBITRIL AND VALSARTAN 1 TABLET(S): 6; 6 PELLET ORAL at 17:17

## 2025-04-29 RX ADMIN — INSULIN LISPRO 2: 100 INJECTION, SOLUTION INTRAVENOUS; SUBCUTANEOUS at 17:23

## 2025-04-29 RX ADMIN — CEPHALEXIN 250 MILLIGRAM(S): 250 CAPSULE ORAL at 06:20

## 2025-04-29 RX ADMIN — ISOSORBIDE MONONITRATE 60 MILLIGRAM(S): 60 TABLET, EXTENDED RELEASE ORAL at 09:50

## 2025-04-29 RX ADMIN — Medication 975 MILLIGRAM(S): at 22:24

## 2025-04-29 RX ADMIN — ATORVASTATIN CALCIUM 80 MILLIGRAM(S): 80 TABLET, FILM COATED ORAL at 21:22

## 2025-04-29 RX ADMIN — GABAPENTIN 400 MILLIGRAM(S): 400 CAPSULE ORAL at 09:49

## 2025-04-29 RX ADMIN — Medication 3 MILLIGRAM(S): at 21:23

## 2025-04-29 RX ADMIN — SACUBITRIL AND VALSARTAN 1 TABLET(S): 6; 6 PELLET ORAL at 06:20

## 2025-04-29 RX ADMIN — CARVEDILOL 6.25 MILLIGRAM(S): 3.12 TABLET, FILM COATED ORAL at 06:20

## 2025-04-29 RX ADMIN — CEPHALEXIN 250 MILLIGRAM(S): 250 CAPSULE ORAL at 17:17

## 2025-04-29 RX ADMIN — BUMETANIDE 2 MILLIGRAM(S): 1 TABLET ORAL at 12:14

## 2025-04-29 RX ADMIN — INSULIN LISPRO 4: 100 INJECTION, SOLUTION INTRAVENOUS; SUBCUTANEOUS at 08:39

## 2025-04-29 RX ADMIN — TICAGRELOR 90 MILLIGRAM(S): 90 TABLET ORAL at 06:20

## 2025-04-29 RX ADMIN — TICAGRELOR 90 MILLIGRAM(S): 90 TABLET ORAL at 17:17

## 2025-04-29 RX ADMIN — METHOCARBAMOL 500 MILLIGRAM(S): 500 TABLET, FILM COATED ORAL at 06:21

## 2025-04-29 RX ADMIN — Medication 1300 MILLIGRAM(S): at 21:23

## 2025-04-29 RX ADMIN — BUMETANIDE 2.5 MG/HR: 1 TABLET ORAL at 06:20

## 2025-04-29 RX ADMIN — BUMETANIDE 5 MG/HR: 1 TABLET ORAL at 18:12

## 2025-04-29 RX ADMIN — Medication 50 MILLIGRAM(S): at 12:14

## 2025-04-29 RX ADMIN — Medication 1 SPRAY(S): at 17:17

## 2025-04-29 RX ADMIN — Medication 40 MILLIGRAM(S): at 06:20

## 2025-04-29 RX ADMIN — INSULIN GLARGINE-YFGN 5 UNIT(S): 100 INJECTION, SOLUTION SUBCUTANEOUS at 21:23

## 2025-04-29 RX ADMIN — Medication 1300 MILLIGRAM(S): at 06:20

## 2025-04-29 NOTE — PROGRESS NOTE ADULT - ASSESSMENT
A) Anemia , CRF with prerenal azotemia improving, Cardiomyopathy with pacer; AGMA; secondary  hyperparathyroid. Dilutional  hyponatremia better with  drip.    P) Meds  same, labs in am.

## 2025-04-29 NOTE — PROGRESS NOTE ADULT - ASSESSMENT
Gen Cards: Dr. Allen  HF: Dr. Polanco  IC: Dr. Bajwa    75 y/o female with PMH of ICM/HFrEF (LVEF 35-40%), CAD s/p PCI, Mobitz II s/p PPM (Nereyda, MDT), HTN, HLD, PAD with chronic right LE wound, DM2, and CKD3, who was BIBA from CHF clinic due to hypervolemia, weight gain of ~20 lbs in the past 9 days despite escalation of PO diuretics.      She was recently admitted on 3/19/25 with c/o CP and found to have NSTEMI. She underwent LHC on 3/21 which showed mid-distal LAD severe stenosis, OM1 and OM2 severe stenosis, mid ISR 50%, distal ISR 99%, s/p PCI with 1 ZEINAB to RCA.  On 3/22 she had a fever with leukocytosis and her blood cultures were positive for E. Coli/ESBL. Her CT A/P showed left pyelonephritis. She also had an BRADY for which her Entresto was held. She was discharged home 3/31 and her CardioMEMS was not at goal and had been rising. She was advised to increase Torsemide 40 mg daily to twice daily on 4/4. Despite this, she has continued to gain weight and reports poor UOP response.     Admission labwork: Na 125, K 4.6, BUN 37.7, Cr 1.94, eGFR 27, Mg 2.3, proBNP 39646!  CXR: increased interstitial markings bilaterally, cardiomegaly.   On 4/10 she developed a fever, tmax 100.4F. RVP negative, empirically started on IV Zosyn.  4/13 s/p right knee fluid aspiration (s/p recent trauma/fall).  4/15 initiated on continuous Bumex infusion for inadequate response to intermittent dosing.  4/21: Received 1 dose of metolazone 5 mg and Bumex gtt switched to 4 mg IV BID.  4/24: s/p CRT-D    Prior Cardiac Testing:  RHC 4/22/25: RA 18, RV 63/18, PA 63/25/37, PCW 22, PA sat 37%, Jose CO/CI 2.5/1.5, Thermo CO 2.5, SVR 2800 dsc, PVR 4.5 WILLARD. No CardioMEMS recalibration needed.     TTE 4/21/25: LVEF 20-25%, LVIDd 4.3 cm, LVOT VTI 12.6 cm, grade 2 DD, mild RVE with mild RV dysfunction, mild-mod AS, mod MR/TR, mild DC, est PASP 57 mmHg, est RAP 8 mmHg.    TTE 3/21/25: LVEF 25-30%, grade II DD, normal RV size/function, severe LAE, moderate MR, mild TR, PASP 58 mmHg ( RAP 8 mmHg), no evidence of LV thrombus, multiple segmental abnormalities/regional WMA.    CardioMEMS PAD (goal 14):  4/29: 24  4/28: 26  4/25: 24  4/22: 29-30  4/20: 31  4/18: 27 4/17: 27 (in chair ~30 degrees).  4/14: 30-33  4/11: 32  4/9: 32  3/27: 26  3/24: 23  3/21: 22

## 2025-04-29 NOTE — PROGRESS NOTE ADULT - SUBJECTIVE AND OBJECTIVE BOX
Mariela Nelson MD (Available on [employer])  [facility] Division of Hospital Medicine    Chief Complaint:  HF    SUBJECTIVE / OVERNIGHT EVENTS:  Pt seen and examined at bedside. Endorses nasal congestion.    Patient denies chest pain, SOB, abd pain, N/V, fever, chills, dysuria or any other complaints. All remainder ROS negative.     INTERVAL EVENTS:  -Pt hemodynamically stable with no overnight events.   -Bumex drip per HF  -Nasal saline spray for congestion.   -PT consult     MEDICATIONS  (STANDING):  acetaminophen     Tablet .. 975 milliGRAM(s) Oral every 8 hours  aspirin  chewable 81 milliGRAM(s) Oral daily  atorvastatin 80 milliGRAM(s) Oral at bedtime  buMETAnide Infusion 0.5 mG/Hr (2.5 mL/Hr) IV Continuous <Continuous>  buMETAnide Injectable 2 milliGRAM(s) IV Push once  carvedilol 6.25 milliGRAM(s) Oral every 12 hours  cephalexin 250 milliGRAM(s) Oral every 12 hours  dextrose 5%. 1000 milliLiter(s) (50 mL/Hr) IV Continuous <Continuous>  dextrose 5%. 1000 milliLiter(s) (100 mL/Hr) IV Continuous <Continuous>  dextrose 50% Injectable 25 Gram(s) IV Push once  dextrose 50% Injectable 12.5 Gram(s) IV Push once  dextrose 50% Injectable 25 Gram(s) IV Push once  epoetin kristina-epbx (RETACRIT) Injectable 66312 Unit(s) SubCutaneous once  gabapentin 400 milliGRAM(s) Oral daily  glucagon  Injectable 1 milliGRAM(s) IntraMuscular once  hydrALAZINE 50 milliGRAM(s) Oral every 8 hours  insulin glargine Injectable (LANTUS) 5 Unit(s) SubCutaneous at bedtime  insulin lispro (ADMELOG) corrective regimen sliding scale   SubCutaneous three times a day before meals  isosorbide   mononitrate ER Tablet (IMDUR) 60 milliGRAM(s) Oral daily  lidocaine   4% Patch 1 Patch Transdermal every 24 hours  melatonin 3 milliGRAM(s) Oral at bedtime  methocarbamol 500 milliGRAM(s) Oral two times a day  pantoprazole    Tablet 40 milliGRAM(s) Oral before breakfast  sacubitril 97 mG/valsartan 103 mG 1 Tablet(s) Oral two times a day  sodium bicarbonate 1300 milliGRAM(s) Oral three times a day  sodium chloride 0.65% Nasal 1 Spray(s) Both Nostrils two times a day  sodium chloride 2% . 150 milliLiter(s) (300 mL/Hr) IV Continuous <Continuous>  ticagrelor 90 milliGRAM(s) Oral every 12 hours    MEDICATIONS  (PRN):  aluminum hydroxide/magnesium hydroxide/simethicone Suspension 30 milliLiter(s) Oral every 4 hours PRN Dyspepsia  benzocaine/menthol Lozenge 1 Lozenge Oral every 2 hours PRN Sore Throat  dextrose Oral Gel 15 Gram(s) Oral once PRN Blood Glucose LESS THAN 70 milliGRAM(s)/deciliter  loperamide 2 milliGRAM(s) Oral two times a day PRN Diarrhea  ondansetron Injectable 4 milliGRAM(s) IV Push every 6 hours PRN Nausea and/or Vomiting      I&O's Summary    28 Apr 2025 07:01  -  29 Apr 2025 07:00  --------------------------------------------------------  IN: 1238.5 mL / OUT: 2600 mL / NET: -1361.5 mL        PHYSICAL EXAM:  Vital Signs Last 24 Hrs  T(C): 36.3 (29 Apr 2025 08:02), Max: 36.8 (28 Apr 2025 16:39)  T(F): 97.4 (29 Apr 2025 08:02), Max: 98.2 (28 Apr 2025 16:39)  HR: 82 (29 Apr 2025 08:02) (73 - 85)  BP: 128/74 (29 Apr 2025 08:02) (93/52 - 137/70)  BP(mean): 93 (29 Apr 2025 06:15) (65 - 93)  RR: 17 (29 Apr 2025 08:02) (17 - 18)  SpO2: 100% (29 Apr 2025 08:02) (97% - 100%)    Parameters below as of 29 Apr 2025 08:02  Patient On (Oxygen Delivery Method): room air        Pt lying in bed in no acute distress  Moist Mucus membranes, no JVD  S1/S2 regular, no murmurs  CTABL, good air entry  Abdomen soft, non-tender, BS+  No LE edema       LABS:                        8.8    8.75  )-----------( 399      ( 29 Apr 2025 07:10 )             27.4     04-29    130[L]  |  90[L]  |  48.6[H]  ----------------------------<  171[H]  4.5   |  24.0  |  2.18[H]    Ca    7.9[L]      29 Apr 2025 07:10  Mg     2.3     04-29    TPro  6.4[L]  /  Alb  3.1[L]  /  TBili  0.6  /  DBili  x   /  AST  15  /  ALT  <5  /  AlkPhos  118  04-29          Urinalysis Basic - ( 29 Apr 2025 07:10 )    Color: x / Appearance: x / SG: x / pH: x  Gluc: 171 mg/dL / Ketone: x  / Bili: x / Urobili: x   Blood: x / Protein: x / Nitrite: x   Leuk Esterase: x / RBC: x / WBC x   Sq Epi: x / Non Sq Epi: x / Bacteria: x        CAPILLARY BLOOD GLUCOSE      POCT Blood Glucose.: 186 mg/dL (29 Apr 2025 12:05)  POCT Blood Glucose.: 214 mg/dL (29 Apr 2025 08:29)  POCT Blood Glucose.: 231 mg/dL (28 Apr 2025 21:24)  POCT Blood Glucose.: 187 mg/dL (28 Apr 2025 17:10)        RADIOLOGY & ADDITIONAL TESTS:  Results Reviewed:   Imaging Personally Reviewed:  Electrocardiogram Personally Reviewed:

## 2025-04-29 NOTE — CHART NOTE - NSCHARTNOTEFT_GEN_A_CORE
Source: Patient [ ]  Family [x ]   other [ x]    Current Diet: Diet, DASH/TLC:   Sodium & Cholesterol Restricted  1000mL Fluid Restriction ([id number])     Special Instructions for Nursing:  Sodium & Cholesterol Restricted (04-25-25 @ 13:55)    PO intake:  < 50% [x ]   50-75%  [ ]   %  [ ]  other :    Source for PO intake [ ] Patient [x ] family [ ] chart [ ] staff [ ] other    Current Weight:   (4/29) 154.5 lbs  (4/28) 147.2 lbs  (4/27) 148.1 lbs  (4/25) 150.1 lbs  (4/24) 156.3 lbs  (4/23) 152.5 lbs  (4/22) 153.8 lbs  (4/21) 154.1 lbs  (4/20) 154.1 lbs  (4/19) 156 lbs  (4/17) 140.6 lbs  (4/15) 153.8 lbs  (4/11) 158 lbs   ? accuracy of weights, noted with 1+ b/l ankle edema, continue to trend and maintain strict I&Os       Pertinent Medications: MEDICATIONS  (STANDING):  atorvastatin 80 milliGRAM(s) Oral at bedtime  buMETAnide Infusion 0.5 mG/Hr (2.5 mL/Hr) IV Continuous <Continuous>  carvedilol 6.25 milliGRAM(s) Oral every 12 hours  dextrose 5%. 1000 milliLiter(s) (100 mL/Hr) IV Continuous <Continuous>  dextrose 5%. 1000 milliLiter(s) (50 mL/Hr) IV Continuous <Continuous>  epoetin kristina-epbx (RETACRIT) Injectable 42505 Unit(s) SubCutaneous once  gabapentin 400 milliGRAM(s) Oral daily  glucagon  Injectable 1 milliGRAM(s) IntraMuscular once  hydrALAZINE 50 milliGRAM(s) Oral every 8 hours  insulin glargine Injectable (LANTUS) 5 Unit(s) SubCutaneous at bedtime  insulin lispro (ADMELOG) corrective regimen sliding scale   SubCutaneous three times a day before meals  isosorbide   mononitrate ER Tablet (IMDUR) 60 milliGRAM(s) Oral daily  pantoprazole    Tablet 40 milliGRAM(s) Oral before breakfast  sacubitril 97 mG/valsartan 103 mG 1 Tablet(s) Oral two times a day  sodium bicarbonate 1300 milliGRAM(s) Oral three times a day    MEDICATIONS  (PRN):  aluminum hydroxide/magnesium hydroxide/simethicone Suspension 30 milliLiter(s) Oral every 4 hours PRN Dyspepsia  loperamide 2 milliGRAM(s) Oral two times a day PRN Diarrhea  ondansetron Injectable 4 milliGRAM(s) IV Push every 6 hours PRN Nausea and/or Vomiting    Pertinent Labs: CBC Full  -  ( 29 Apr 2025 07:10 )  WBC Count : 8.75 K/uL  RBC Count : 3.07 M/uL  Hemoglobin : 8.8 g/dL  Hematocrit : 27.4 %    04-29 Na130 mmol/L[L] Glu 171 mg/dL[H] K+ 4.5 mmol/L Cr  2.18 mg/dL[H] BUN 48.6 mg/dL[H] Phos n/a   Alb 3.1 g/dL[L] PAB n/a       Skin: No pressure injuries documented     Nutrition focused physical exam not conducted at this time- found signs of malnutrition [ ]absent [ ]present    Subcutaneous fat loss: [ ] Orbital fat pads region, [ ]Buccal fat region, [ ]Triceps region,  [ ]Ribs region    Muscle wasting: [ ]Temples region, [ ]Clavicle region, [ ]Shoulder region, [ ]Scapula region, [ ]Interosseous region,  [ ]thigh region, [ ]Calf region    Estimated Needs:   [x ] no change since previous assessment  [ ] recalculated:     Current Nutrition Diagnosis: Pt remains at nutrition risk secondary to Increased Nutrient Needs (protein) related to increased physiological demand for healing as evidenced by heart failure.     Consult received for education; noted previously received diet education by RD at initial interview. Spoke with pts  via  [facility] ID #[id number]. Pt sleeping at time of interview, arousable but continued to fall back asleep during interview.  reports pt eating a small amount at meals. Noted pt does not want to receive Glucerna supplements as caused her to have diarrhea. Elevated blood sugars per chart review- currently not ordered for a consistent carbohydrate diet. Provided  with diet education on heart failure nutrition therapy and meal planning with plate method- RD to reinforce diet education to pt as feasible/appropriate.    Recommendations:   1) Continue diet as tolerated; will trial Gelatein to provide additional calories/protein.   2) Encourage po intake, monitor diet tolerance, and provide assistance at meals as needed.   3) Rx: MVI daily.   4) Monitor BG levels, correct PRN.   5) Obtain daily weights to monitor trends.     Monitoring and Evaluation:   [x] PO intake [x] Tolerance to diet prescription [X] Weights  [X] Follow up per protocol [X] Labs: chem 8, POCT glucose. Source: Patient [ ]  Family [x ]   other [ x]    Current Diet: Diet, DASH/TLC:   Sodium & Cholesterol Restricted  1000mL Fluid Restriction ([id number])     Special Instructions for Nursing:  Sodium & Cholesterol Restricted (04-25-25 @ 13:55)    PO intake:  < 50% [x ]   50-75%  [ ]   %  [ ]  other :    Source for PO intake [ ] Patient [x ] family [ ] chart [ ] staff [ ] other    Current Weight:   (4/29) 154.5 lbs  (4/28) 147.2 lbs  (4/27) 148.1 lbs  (4/25) 150.1 lbs  (4/24) 156.3 lbs  (4/23) 152.5 lbs  (4/22) 153.8 lbs  (4/21) 154.1 lbs  (4/20) 154.1 lbs  (4/19) 156 lbs  (4/17) 140.6 lbs  (4/15) 153.8 lbs  (4/11) 158 lbs   ? accuracy of weights, noted with 1+ b/l ankle edema, continue to trend and maintain strict I&Os       Pertinent Medications: MEDICATIONS  (STANDING):  atorvastatin 80 milliGRAM(s) Oral at bedtime  buMETAnide Infusion 0.5 mG/Hr (2.5 mL/Hr) IV Continuous <Continuous>  carvedilol 6.25 milliGRAM(s) Oral every 12 hours  dextrose 5%. 1000 milliLiter(s) (100 mL/Hr) IV Continuous <Continuous>  dextrose 5%. 1000 milliLiter(s) (50 mL/Hr) IV Continuous <Continuous>  epoetin kristina-epbx (RETACRIT) Injectable 52899 Unit(s) SubCutaneous once  gabapentin 400 milliGRAM(s) Oral daily  glucagon  Injectable 1 milliGRAM(s) IntraMuscular once  hydrALAZINE 50 milliGRAM(s) Oral every 8 hours  insulin glargine Injectable (LANTUS) 5 Unit(s) SubCutaneous at bedtime  insulin lispro (ADMELOG) corrective regimen sliding scale   SubCutaneous three times a day before meals  isosorbide   mononitrate ER Tablet (IMDUR) 60 milliGRAM(s) Oral daily  pantoprazole    Tablet 40 milliGRAM(s) Oral before breakfast  sacubitril 97 mG/valsartan 103 mG 1 Tablet(s) Oral two times a day  sodium bicarbonate 1300 milliGRAM(s) Oral three times a day    MEDICATIONS  (PRN):  aluminum hydroxide/magnesium hydroxide/simethicone Suspension 30 milliLiter(s) Oral every 4 hours PRN Dyspepsia  loperamide 2 milliGRAM(s) Oral two times a day PRN Diarrhea  ondansetron Injectable 4 milliGRAM(s) IV Push every 6 hours PRN Nausea and/or Vomiting    Pertinent Labs: CBC Full  -  ( 29 Apr 2025 07:10 )  WBC Count : 8.75 K/uL  RBC Count : 3.07 M/uL  Hemoglobin : 8.8 g/dL  Hematocrit : 27.4 %    04-29 Na130 mmol/L[L] Glu 171 mg/dL[H] K+ 4.5 mmol/L Cr  2.18 mg/dL[H] BUN 48.6 mg/dL[H] Phos n/a   Alb 3.1 g/dL[L] PAB n/a       Skin: No pressure injuries documented     Nutrition focused physical exam not conducted at this time- found signs of malnutrition [ ]absent [ ]present    Subcutaneous fat loss: [ ] Orbital fat pads region, [ ]Buccal fat region, [ ]Triceps region,  [ ]Ribs region    Muscle wasting: [ ]Temples region, [ ]Clavicle region, [ ]Shoulder region, [ ]Scapula region, [ ]Interosseous region,  [ ]thigh region, [ ]Calf region    Estimated Needs:   [x ] no change since previous assessment  [ ] recalculated:     Current Nutrition Diagnosis: Pt remains at nutrition risk secondary to Increased Nutrient Needs (protein) related to increased physiological demand for healing as evidenced by heart failure.     Consult received for education; noted previously received diet education by RD at initial interview. Spoke with pts  via  [facility] ID #[id number]. Pt sleeping at time of interview, arousable but continued to fall back asleep during interview.  reports pt eating a small amount at meals. Noted pt does not want to receive Glucerna supplements as caused her to have diarrhea. Elevated blood sugars per chart review- currently not ordered for a consistent carbohydrate diet. Provided  with diet education on heart failure nutrition therapy and meal planning with plate method- RD to reinforce diet education to pt as feasible/appropriate.    Recommendations:   1) Continue diet as tolerated; will trial sugar-free Gelatein BID to provide additional calories/protein.   2) Encourage po intake, monitor diet tolerance, and provide assistance at meals as needed.   3) Rx: MVI daily.   4) Monitor BG levels, correct PRN.   5) Obtain daily weights to monitor trends.     Monitoring and Evaluation:   [x] PO intake [x] Tolerance to diet prescription [X] Weights  [X] Follow up per protocol [X] Labs: chem 8, POCT glucose. Source: Patient [ ]  Family [x ]   other [ x]    Current Diet: Diet, DASH/TLC:   Sodium & Cholesterol Restricted  1000mL Fluid Restriction ([id number])     Special Instructions for Nursing:  Sodium & Cholesterol Restricted (04-25-25 @ 13:55)    PO intake:  < 50% [x ]   50-75%  [ ]   %  [ ]  other :    Source for PO intake [ ] Patient [x ] family [ ] chart [ ] staff [ ] other    Current Weight:   (4/29) 154.5 lbs  (4/28) 147.2 lbs  (4/27) 148.1 lbs  (4/25) 150.1 lbs  (4/24) 156.3 lbs  (4/23) 152.5 lbs  (4/22) 153.8 lbs  (4/21) 154.1 lbs  (4/20) 154.1 lbs  (4/19) 156 lbs  (4/17) 140.6 lbs  (4/15) 153.8 lbs  (4/11) 158 lbs   ? accuracy of weights, noted with 1+ b/l ankle edema, continue to trend and maintain strict I&Os       Pertinent Medications: MEDICATIONS  (STANDING):  atorvastatin 80 milliGRAM(s) Oral at bedtime  buMETAnide Infusion 0.5 mG/Hr (2.5 mL/Hr) IV Continuous <Continuous>  carvedilol 6.25 milliGRAM(s) Oral every 12 hours  dextrose 5%. 1000 milliLiter(s) (100 mL/Hr) IV Continuous <Continuous>  dextrose 5%. 1000 milliLiter(s) (50 mL/Hr) IV Continuous <Continuous>  epoetin kristina-epbx (RETACRIT) Injectable 17750 Unit(s) SubCutaneous once  gabapentin 400 milliGRAM(s) Oral daily  glucagon  Injectable 1 milliGRAM(s) IntraMuscular once  hydrALAZINE 50 milliGRAM(s) Oral every 8 hours  insulin glargine Injectable (LANTUS) 5 Unit(s) SubCutaneous at bedtime  insulin lispro (ADMELOG) corrective regimen sliding scale   SubCutaneous three times a day before meals  isosorbide   mononitrate ER Tablet (IMDUR) 60 milliGRAM(s) Oral daily  pantoprazole    Tablet 40 milliGRAM(s) Oral before breakfast  sacubitril 97 mG/valsartan 103 mG 1 Tablet(s) Oral two times a day  sodium bicarbonate 1300 milliGRAM(s) Oral three times a day    MEDICATIONS  (PRN):  aluminum hydroxide/magnesium hydroxide/simethicone Suspension 30 milliLiter(s) Oral every 4 hours PRN Dyspepsia  loperamide 2 milliGRAM(s) Oral two times a day PRN Diarrhea  ondansetron Injectable 4 milliGRAM(s) IV Push every 6 hours PRN Nausea and/or Vomiting    Pertinent Labs: CBC Full  -  ( 29 Apr 2025 07:10 )  WBC Count : 8.75 K/uL  RBC Count : 3.07 M/uL  Hemoglobin : 8.8 g/dL  Hematocrit : 27.4 %    04-29 Na130 mmol/L[L] Glu 171 mg/dL[H] K+ 4.5 mmol/L Cr  2.18 mg/dL[H] BUN 48.6 mg/dL[H] Phos n/a   Alb 3.1 g/dL[L] PAB n/a       Skin: No pressure injuries documented     Nutrition focused physical exam not conducted at this time- found signs of malnutrition [ ]absent [ ]present    Subcutaneous fat loss: [ ] Orbital fat pads region, [ ]Buccal fat region, [ ]Triceps region,  [ ]Ribs region    Muscle wasting: [ ]Temples region, [ ]Clavicle region, [ ]Shoulder region, [ ]Scapula region, [ ]Interosseous region,  [ ]thigh region, [ ]Calf region    Estimated Needs:   [x ] no change since previous assessment  [ ] recalculated:     Current Nutrition Diagnosis: Pt remains at nutrition risk secondary to Increased Nutrient Needs (protein) related to increased physiological demand for healing as evidenced by heart failure.     Consult received for education; noted previously received diet education by RD at initial interview. Spoke with pts  via  [facility] ID #[id number]. Pt sleeping at time of interview, arousable but continued to fall back asleep during interview.  reports pt eating a small amount at meals. Noted pt does not want to receive Glucerna supplements as caused her to have diarrhea. Elevated blood sugars per chart review- currently not ordered for a consistent carbohydrate diet. Provided  with diet education on heart failure nutrition therapy and meal planning with plate method- RD to reinforce diet education to pt as feasible/appropriate.    Recommendations:   1) Continue diet as tolerated, add consistent carbohydrate to diet rx; will trial sugar-free Gelatein BID to provide additional calories/protein.   2) Encourage po intake, monitor diet tolerance, and provide assistance at meals as needed.   3) Rx: MVI daily.   4) Monitor BG levels, correct PRN.   5) Obtain daily weights to monitor trends.     Monitoring and Evaluation:   [x] PO intake [x] Tolerance to diet prescription [X] Weights  [X] Follow up per protocol [X] Labs: chem 8, POCT glucose.

## 2025-04-29 NOTE — PROGRESS NOTE ADULT - SUBJECTIVE AND OBJECTIVE BOX
NEPHROLOGY INTERVAL HPI/OVERNIGHT EVENTS:    No new events.    MEDICATIONS  (STANDING):  acetaminophen     Tablet .. 975 milliGRAM(s) Oral every 8 hours  aspirin  chewable 81 milliGRAM(s) Oral daily  atorvastatin 80 milliGRAM(s) Oral at bedti  carvedilol 6.25 milliGRAM(s) Oral every 12 hours  dextrose 5%. 1000 milliLiter(s) (100 mL/Hr) IV Continuous <Continuous>  dextrose 5%. 1000 milliLiter(s) (50 mL/Hr) IV Continuous <Continuous>  dextrose 50% Injectable 25 Gram(s) IV Push once  dextrose 50% Injectable 12.5 Gram(s) IV Push once  dextrose 50% Injectable 25 Gram(s) IV Push once  gabapentin 400 milliGRAM(s) Oral daily  glucagon  Injectable 1 milliGRAM(s) IntraMuscular once  hydrALAZINE 50 milliGRAM(s) Oral three times a day  insulin glargine Injectable (LANTUS) 5 Unit(s) SubCutaneous at bedtime  insulin lispro (ADMELOG) corrective regimen sliding scale   SubCutaneous three times a day before meals  isosorbide   mononitrate ER Tablet (IMDUR) 60 milliGRAM(s) Oral daily  lidocaine   4% Patch 1 Patch Transdermal every 24 hours  melatonin 3 milliGRAM(s) Oral at bedtime  methocarbamol 500 milliGRAM(s) Oral two times a day  pantoprazole    Tablet 40 milliGRAM(s) Oral before breakfast  sacubitril 97 mG/valsartan 103 mG 1 Tablet(s) Oral two times a day  sertraline 25 milliGRAM(s) Oral daily  spironolactone 25 milliGRAM(s) Oral daily  ticagrelor 90 milliGRAM(s) Oral every 12 hours    MEDICATIONS  (PRN):  aluminum hydroxide/magnesium hydroxide/simethicone Suspension 30 milliLiter(s) Oral every 4 hours PRN Dyspepsia  dextrose Oral Gel 15 Gram(s) Oral once PRN Blood Glucose LESS THAN 70 milliGRAM(s)/deciliter  loperamide 2 milliGRAM(s) Oral two times a day PRN Diarrhea      Allergies    No Known Allergies      Vital Signs Last 24 Hrs  T(C): 36.3 (29 Apr 2025 08:02), Max: 36.8 (28 Apr 2025 16:39)  T(F): 97.4 (29 Apr 2025 08:02), Max: 98.2 (28 Apr 2025 16:39)  HR: 82 (29 Apr 2025 08:02) (73 - 85)  BP: 128/74 (29 Apr 2025 08:02) (93/52 - 137/70)  BP(mean): 93 (29 Apr 2025 06:15) (65 - 93)  RR: 17 (29 Apr 2025 08:02) (17 - 18)  SpO2: 100% (29 Apr 2025 08:02) (97% - 100%)    Parameters below as of 29 Apr 2025 08:02  Patient On (Oxygen Delivery Method): room air      T(C): 36.6 (28 Apr 2025 07:41), Max: 36.8 (28 Apr 2025 05:48)  T(F): 97.9 (28 Apr 2025 07:41), Max: 98.3 (28 Apr 2025 05:48)  HR: 70 (28 Apr 2025 08:32) (69 - 83)  BP: 116/60 (28 Apr 2025 08:32) (92/51 - 129/62)  BP(mean): --  RR: 19 (28 Apr 2025 07:41) (18 - 19)  SpO2: 100% (28 Apr 2025 07:41) (99% - 100%)    Parameters below as of 28 Apr 2025 07:41  Patient On (Oxygen Delivery Method): room air      T(C): 36.6 (27 Apr 2025 07:15), Max: 36.9 (26 Apr 2025 16:23)  T(F): 97.9 (27 Apr 2025 07:15), Max: 98.5 (26 Apr 2025 16:23)  HR: 83 (27 Apr 2025 14:15) (62 - 83)  BP: 128/72 (27 Apr 2025 14:15) (97/57 - 132/71)  BP(mean): --  RR: 18 (27 Apr 2025 09:47) (18 - 19)  SpO2: 97% (27 Apr 2025 09:47) (94% - 98%)    Parameters below as of 27 Apr 2025 08:42  Patient On (Oxygen Delivery Method): room air      T(C): 36.7 (25 Apr 2025 13:12), Max: 36.9 (25 Apr 2025 00:03)  T(F): 98 (25 Apr 2025 13:12), Max: 98.4 (25 Apr 2025 00:03)  HR: 60 (25 Apr 2025 13:12) (60 - 69)  BP: 109/64 (25 Apr 2025 13:12) (101/54 - 139/66)  BP(mean): --  RR: 18 (25 Apr 2025 13:12) (14 - 19)  SpO2: 99% (25 Apr 2025 13:12) (93% - 100%)    Parameters below as of 25 Apr 2025 13:12  Patient On (Oxygen Delivery Method): room air      T(C): 36.7 (24 Apr 2025 07:10), Max: 36.7 (24 Apr 2025 07:10)  T(F): 98 (24 Apr 2025 07:10), Max: 98 (24 Apr 2025 07:10)  HR: 60 (24 Apr 2025 07:10) (51 - 60)  BP: 124/69 (24 Apr 2025 07:10) (110/57 - 144/72)  BP(mean): --  RR: 18 (24 Apr 2025 07:10) (17 - 18)  SpO2: 94% (24 Apr 2025 07:10) (94% - 100%)    Parameters below as of 24 Apr 2025 07:10  Patient On (Oxygen Delivery Method): room air      PHYSICAL EXAM:      GENERAL: Comfortable in bed, several  family members  present  HEAD: NCAT  EYES: open  NECK: veins  wnl  NERVOUS SYSTEM:  Alert & Oriented supine in bed  CHEST/LUNG: no 02, no  wheezes  HEART: Regular rate and rhythm; Left new pacer  site  clean  ABDOMEN: Soft, Nontender, +BS  EXTREMITIES: legs  same   : Neg      LABS:    04-29    130[L]  |  90[L]  |  48.6[H]  ----------------------------<  171[H]  4.5   |  24.0  |  2.18[H]    Ca    7.9[L]      29 Apr 2025 07:10  Mg     2.3     04-29    TPro  6.4[L]  /  Alb  3.1[L]  /  TBili  0.6  /  DBili  x   /  AST  15  /  ALT  <5  /  AlkPhos  118  04-29      04-28    130[L]  |  93[L]  |  52.6[H]  ----------------------------<  153[H]  4.9   |  24.0  |  2.32[H]    Ca    8.1[L]      28 Apr 2025 06:00        04-27    127[L]  |  92[L]  |  47.9[H]  ----------------------------<  109[H]  5.3   |  21.0[L]  |  2.51[H]    Ca    7.8[L]      27 Apr 2025 06:00  Phos  3.9     04-26  Mg     2.4     04-26    TPro  6.0[L]  /  Alb  2.8[L]  /  TBili  0.3[L]  /  DBili  x   /  AST  14  /  ALT  <5  /  AlkPhos  118  04-26      04-25    128[L]  |  93[L]  |  42.4[H]  ----------------------------<  240[H]  4.8   |  19.0[L]  |  1.94[H]    Ca    8.1[L]      25 Apr 2025 06:20  Mg     2.3     04-25                            8.9    10.00 )-----------( 393      ( 24 Apr 2025 05:01 )             27.7     04-24    130[L]  |  93[L]  |  42.3[H]  ----------------------------<  196[H]  4.8   |  19.0[L]  |  1.96[H]    Ca    8.3[L]      24 Apr 2025 05:01      PT/INR - ( 24 Apr 2025 05:01 )   PT: 15.1 sec;   INR: 1.34 ratio         PTT - ( 24 Apr 2025 05:01 )  PTT:31.9 sec  Urinalysis Basic - ( 24 Apr 2025 05:01 )    Color: x / Appearance: x / SG: x / pH: x  Gluc: 196 mg/dL / Ketone: x  / Bili: x / Urobili: x   Blood: x / Protein: x / Nitrite: x   Leuk Esterase: x / RBC: x / WBC x   Sq Epi: x / Non Sq Epi: x / Bacteria: x              RADIOLOGY & ADDITIONAL TESTS:

## 2025-04-29 NOTE — PROGRESS NOTE ADULT - SUBJECTIVE AND OBJECTIVE BOX
ADVANCED HEART FAILURE - PROGRESS NOTE  [address] [place] [address]  Office Phone: [phone]/Fax: [phone]  Service/On Call Phone [phone]  _______________________________________________________________________________________________________    Subjective:  - NAEO  - Patient feels well with no complaints. She is OOB to the chair this morning but not been able to ambulate (2 assist to chair). She denies SOB at rest, orthopnea, PND, CP, palpitations, LH/dizziness.    Medications:  acetaminophen     Tablet .. 975 milliGRAM(s) Oral every 8 hours  aluminum hydroxide/magnesium hydroxide/simethicone Suspension 30 milliLiter(s) Oral every 4 hours PRN  aspirin  chewable 81 milliGRAM(s) Oral daily  atorvastatin 80 milliGRAM(s) Oral at bedtime  benzocaine/menthol Lozenge 1 Lozenge Oral every 2 hours PRN  buMETAnide Infusion 0.5 mG/Hr IV Continuous <Continuous>  buMETAnide Injectable 2 milliGRAM(s) IV Push once  carvedilol 6.25 milliGRAM(s) Oral every 12 hours  cephalexin 250 milliGRAM(s) Oral every 12 hours  dextrose 5%. 1000 milliLiter(s) IV Continuous <Continuous>  dextrose 5%. 1000 milliLiter(s) IV Continuous <Continuous>  dextrose 50% Injectable 25 Gram(s) IV Push once  dextrose 50% Injectable 12.5 Gram(s) IV Push once  dextrose 50% Injectable 25 Gram(s) IV Push once  dextrose Oral Gel 15 Gram(s) Oral once PRN  epoetin kristina-epbx (RETACRIT) Injectable 35031 Unit(s) SubCutaneous once  gabapentin 400 milliGRAM(s) Oral daily  glucagon  Injectable 1 milliGRAM(s) IntraMuscular once  hydrALAZINE 50 milliGRAM(s) Oral every 8 hours  insulin glargine Injectable (LANTUS) 5 Unit(s) SubCutaneous at bedtime  insulin lispro (ADMELOG) corrective regimen sliding scale   SubCutaneous three times a day before meals  isosorbide   mononitrate ER Tablet (IMDUR) 60 milliGRAM(s) Oral daily  lidocaine   4% Patch 1 Patch Transdermal every 24 hours  loperamide 2 milliGRAM(s) Oral two times a day PRN  melatonin 3 milliGRAM(s) Oral at bedtime  methocarbamol 500 milliGRAM(s) Oral two times a day  ondansetron Injectable 4 milliGRAM(s) IV Push every 6 hours PRN  pantoprazole    Tablet 40 milliGRAM(s) Oral before breakfast  sacubitril 97 mG/valsartan 103 mG 1 Tablet(s) Oral two times a day  sodium bicarbonate 1300 milliGRAM(s) Oral three times a day  sodium chloride 0.65% Nasal 1 Spray(s) Both Nostrils two times a day  sodium chloride 2% . 150 milliLiter(s) IV Continuous <Continuous>  ticagrelor 90 milliGRAM(s) Oral every 12 hours      ICU Vital Signs Last 24 Hrs  T(C): 36.3, Max: 36.8 ( @ 16:39)  HR: 82 (73 - 85)  BP: 128/74 (93/52 - 137/70)  BP(mean): 93 (65 - 93)  ABP: --  ABP(mean): --  RR: 17 (17 - 18)  SpO2: 100% (97% - 100%)    Weight in k.1 (25)  Weight in k.8 (25)      I&O's Summary Last 24 Hrs    IN: 1238.5 mL / OUT: 2600 mL / NET: -1361.5 mL      Tele: Paced 60s    Physical Exam:    General: No distress. Comfortable.  Neck: JVP elevated.  Respiratory: Clear to auscultation bilaterally  CV: RRR. Normal S1 and S2. No murmurs, rub, or gallops. Radial pulses normal.  Abdomen: Soft, non-distended, non-tender  Extremities: Warm, no edema  Neurology: Non-focal, alert and oriented times three.   Psych: Affect normal    Labs:    ( 25 @ 07:10 )               8.8    8.75  )--------( 399                  27.4     ( 25 @ 07:10 )     130  |  90  |  48.6  ---------------------<  171  4.5  |  24.0  |  2.18    Ca 7.9  Phos x   Mg 2.3    ( 25 @ 07:10 )  TPro  6.4  /  Alb  3.1  /  TBili  0.6  /  DBili  x   /  AST  15  /  ALT  <5  /  AlkPhos  118    ( 25 @ 16:15 )  TropHS 118   / CK x     / CKMB x      ( 25 @ 10:04 )  TropHS 140   / CK x     / CKMB x        Lactate, Blood: 1.1 (25 @ 07:10)   A-T Advancement Flap Text: The defect edges were debeveled with a #15 scalpel blade.  Given the location of the defect, shape of the defect and the proximity to free margins an A-T advancement flap was deemed most appropriate.  Using a sterile surgical marker, an appropriate advancement flap was drawn incorporating the defect and placing the expected incisions within the relaxed skin tension lines where possible.    The area thus outlined was incised deep to adipose tissue with a #15 scalpel blade.  The skin margins were undermined to an appropriate distance in all directions utilizing iris scissors.

## 2025-04-29 NOTE — PROGRESS NOTE ADULT - PROBLEM SELECTOR PLAN 1
- ICM, most recent TTE on 4/21/25 showed further decline of LVEF to 20-25%  - Clinically appears hypervolemic on exam w/warm extremities.  - Serum pro-BNP > 70k on 4/21. Repeat pro-BNP 43k on 4/28.  - Hyponatremia improving with diuresis. Repeat BMP at 4pm.  - Diuretics: Continue Bumex infusion at 0.5 mg/hr (started 4/27) and will give another dose of hypertonic saline 2% bolus (150 mL over 30 minutes) with an additional dose of Bumex 2 mg IVP.  - GDMT: Continue Coreg 6.25 mg BID, Entresto  mg BID, and hydralazine 50 mg TID. Aldactone stopped for hyperkalemia. When euvolemic, will wean hydralazine and increase uptitrate Coreg. Continue Imdur 60 mg daily for anginal pain. No Farxiga 2/2 h/o pyelonephritis.   - Device: h/o Micra PPM with chronic . She is now s/p CRT-D on 4/24.  - Low Na, 1.5L FR diet recommended  - Please document strict I&Os and daily standing weight.  - Needs daily PT. Patient has not tried ambulating in days. Very deconditioned.

## 2025-04-30 LAB
ANION GAP SERPL CALC-SCNC: 15 MMOL/L — SIGNIFICANT CHANGE UP (ref 5–17)
ANION GAP SERPL CALC-SCNC: 16 MMOL/L — SIGNIFICANT CHANGE UP (ref 5–17)
BUN SERPL-MCNC: 47.3 MG/DL — HIGH (ref 8–20)
BUN SERPL-MCNC: 47.6 MG/DL — HIGH (ref 8–20)
CALCIUM SERPL-MCNC: 8.2 MG/DL — LOW (ref 8.4–10.5)
CALCIUM SERPL-MCNC: 8.5 MG/DL — SIGNIFICANT CHANGE UP (ref 8.4–10.5)
CHLORIDE SERPL-SCNC: 93 MMOL/L — LOW (ref 96–108)
CHLORIDE SERPL-SCNC: 94 MMOL/L — LOW (ref 96–108)
CO2 SERPL-SCNC: 24 MMOL/L — SIGNIFICANT CHANGE UP (ref 22–29)
CO2 SERPL-SCNC: 24 MMOL/L — SIGNIFICANT CHANGE UP (ref 22–29)
CREAT SERPL-MCNC: 2 MG/DL — HIGH (ref 0.5–1.3)
CREAT SERPL-MCNC: 2.08 MG/DL — HIGH (ref 0.5–1.3)
EGFR: 25 ML/MIN/1.73M2 — LOW
EGFR: 25 ML/MIN/1.73M2 — LOW
EGFR: 26 ML/MIN/1.73M2 — LOW
EGFR: 26 ML/MIN/1.73M2 — LOW
GLUCOSE BLDC GLUCOMTR-MCNC: 112 MG/DL — HIGH (ref 70–99)
GLUCOSE BLDC GLUCOMTR-MCNC: 182 MG/DL — HIGH (ref 70–99)
GLUCOSE BLDC GLUCOMTR-MCNC: 193 MG/DL — HIGH (ref 70–99)
GLUCOSE BLDC GLUCOMTR-MCNC: 205 MG/DL — HIGH (ref 70–99)
GLUCOSE SERPL-MCNC: 149 MG/DL — HIGH (ref 70–99)
GLUCOSE SERPL-MCNC: 195 MG/DL — HIGH (ref 70–99)
MAGNESIUM SERPL-MCNC: 2 MG/DL — SIGNIFICANT CHANGE UP (ref 1.6–2.6)
POTASSIUM SERPL-MCNC: 4.3 MMOL/L — SIGNIFICANT CHANGE UP (ref 3.5–5.3)
POTASSIUM SERPL-MCNC: 4.5 MMOL/L — SIGNIFICANT CHANGE UP (ref 3.5–5.3)
POTASSIUM SERPL-SCNC: 4.3 MMOL/L — SIGNIFICANT CHANGE UP (ref 3.5–5.3)
POTASSIUM SERPL-SCNC: 4.5 MMOL/L — SIGNIFICANT CHANGE UP (ref 3.5–5.3)
SODIUM SERPL-SCNC: 132 MMOL/L — LOW (ref 135–145)
SODIUM SERPL-SCNC: 133 MMOL/L — LOW (ref 135–145)

## 2025-04-30 PROCEDURE — 99233 SBSQ HOSP IP/OBS HIGH 50: CPT

## 2025-04-30 PROCEDURE — 99232 SBSQ HOSP IP/OBS MODERATE 35: CPT

## 2025-04-30 RX ORDER — SODIUM CHLORIDE 3 G/100ML
150 INJECTION, SOLUTION INTRAVENOUS
Refills: 0 | Status: COMPLETED | OUTPATIENT
Start: 2025-04-30 | End: 2025-04-30

## 2025-04-30 RX ADMIN — ATORVASTATIN CALCIUM 80 MILLIGRAM(S): 80 TABLET, FILM COATED ORAL at 21:37

## 2025-04-30 RX ADMIN — Medication 81 MILLIGRAM(S): at 12:39

## 2025-04-30 RX ADMIN — INSULIN GLARGINE-YFGN 5 UNIT(S): 100 INJECTION, SOLUTION SUBCUTANEOUS at 21:37

## 2025-04-30 RX ADMIN — INSULIN LISPRO 2: 100 INJECTION, SOLUTION INTRAVENOUS; SUBCUTANEOUS at 08:51

## 2025-04-30 RX ADMIN — Medication 1300 MILLIGRAM(S): at 13:19

## 2025-04-30 RX ADMIN — Medication 975 MILLIGRAM(S): at 13:18

## 2025-04-30 RX ADMIN — SACUBITRIL AND VALSARTAN 1 TABLET(S): 6; 6 PELLET ORAL at 17:32

## 2025-04-30 RX ADMIN — SODIUM CHLORIDE 300 MILLILITER(S): 3 INJECTION, SOLUTION INTRAVENOUS at 12:47

## 2025-04-30 RX ADMIN — CARVEDILOL 6.25 MILLIGRAM(S): 3.12 TABLET, FILM COATED ORAL at 05:31

## 2025-04-30 RX ADMIN — Medication 1 SPRAY(S): at 05:34

## 2025-04-30 RX ADMIN — Medication 975 MILLIGRAM(S): at 21:37

## 2025-04-30 RX ADMIN — TICAGRELOR 90 MILLIGRAM(S): 90 TABLET ORAL at 05:32

## 2025-04-30 RX ADMIN — Medication 1300 MILLIGRAM(S): at 21:37

## 2025-04-30 RX ADMIN — Medication 50 MILLIGRAM(S): at 05:31

## 2025-04-30 RX ADMIN — LIDOCAINE HYDROCHLORIDE 1 PATCH: 20 JELLY TOPICAL at 14:30

## 2025-04-30 RX ADMIN — Medication 40 MILLIGRAM(S): at 05:31

## 2025-04-30 RX ADMIN — Medication 975 MILLIGRAM(S): at 14:10

## 2025-04-30 RX ADMIN — TICAGRELOR 90 MILLIGRAM(S): 90 TABLET ORAL at 17:32

## 2025-04-30 RX ADMIN — INSULIN LISPRO 4: 100 INJECTION, SOLUTION INTRAVENOUS; SUBCUTANEOUS at 12:38

## 2025-04-30 RX ADMIN — Medication 975 MILLIGRAM(S): at 05:32

## 2025-04-30 RX ADMIN — INSULIN LISPRO 2: 100 INJECTION, SOLUTION INTRAVENOUS; SUBCUTANEOUS at 17:33

## 2025-04-30 RX ADMIN — BUMETANIDE 5 MG/HR: 1 TABLET ORAL at 13:16

## 2025-04-30 RX ADMIN — ISOSORBIDE MONONITRATE 60 MILLIGRAM(S): 60 TABLET, EXTENDED RELEASE ORAL at 12:41

## 2025-04-30 RX ADMIN — Medication 1 LOZENGE: at 17:49

## 2025-04-30 RX ADMIN — BUMETANIDE 5 MG/HR: 1 TABLET ORAL at 18:13

## 2025-04-30 RX ADMIN — SACUBITRIL AND VALSARTAN 1 TABLET(S): 6; 6 PELLET ORAL at 05:31

## 2025-04-30 RX ADMIN — METHOCARBAMOL 500 MILLIGRAM(S): 500 TABLET, FILM COATED ORAL at 17:33

## 2025-04-30 RX ADMIN — Medication 1300 MILLIGRAM(S): at 05:33

## 2025-04-30 RX ADMIN — Medication 975 MILLIGRAM(S): at 06:27

## 2025-04-30 RX ADMIN — GABAPENTIN 400 MILLIGRAM(S): 400 CAPSULE ORAL at 12:39

## 2025-04-30 RX ADMIN — Medication 3 MILLIGRAM(S): at 21:37

## 2025-04-30 RX ADMIN — Medication 50 MILLIGRAM(S): at 13:19

## 2025-04-30 RX ADMIN — Medication 1 SPRAY(S): at 17:33

## 2025-04-30 RX ADMIN — CARVEDILOL 6.25 MILLIGRAM(S): 3.12 TABLET, FILM COATED ORAL at 17:33

## 2025-04-30 RX ADMIN — METHOCARBAMOL 500 MILLIGRAM(S): 500 TABLET, FILM COATED ORAL at 05:32

## 2025-04-30 NOTE — PROGRESS NOTE ADULT - SUBJECTIVE AND OBJECTIVE BOX
Mariela Nelson MD (Available on [employer])  [facility] Division of Hospital Medicine    Chief Complaint:  Sob    SUBJECTIVE / OVERNIGHT EVENTS:  Pt seen and examined at bedside. Endorses feeling well today, no longer has nasal congestion.     Patient denies chest pain, SOB, abd pain, N/V, fever, chills, dysuria or any other complaints. All remainder ROS negative.     INTERVAL EVENTS:  -Pt hemodynamically stable with no overnight events.   -Cw Bumex drip per HF. Cr improving     MEDICATIONS  (STANDING):  acetaminophen     Tablet .. 975 milliGRAM(s) Oral every 8 hours  aspirin  chewable 81 milliGRAM(s) Oral daily  atorvastatin 80 milliGRAM(s) Oral at bedtime  buMETAnide Infusion 1 mG/Hr (5 mL/Hr) IV Continuous <Continuous>  carvedilol 6.25 milliGRAM(s) Oral every 12 hours  dextrose 5%. 1000 milliLiter(s) (100 mL/Hr) IV Continuous <Continuous>  dextrose 5%. 1000 milliLiter(s) (50 mL/Hr) IV Continuous <Continuous>  dextrose 50% Injectable 25 Gram(s) IV Push once  dextrose 50% Injectable 12.5 Gram(s) IV Push once  dextrose 50% Injectable 25 Gram(s) IV Push once  epoetin kristina-epbx (RETACRIT) Injectable 67390 Unit(s) SubCutaneous once  gabapentin 400 milliGRAM(s) Oral daily  glucagon  Injectable 1 milliGRAM(s) IntraMuscular once  hydrALAZINE 50 milliGRAM(s) Oral every 8 hours  insulin glargine Injectable (LANTUS) 5 Unit(s) SubCutaneous at bedtime  insulin lispro (ADMELOG) corrective regimen sliding scale   SubCutaneous three times a day before meals  isosorbide   mononitrate ER Tablet (IMDUR) 60 milliGRAM(s) Oral daily  lidocaine   4% Patch 1 Patch Transdermal every 24 hours  melatonin 3 milliGRAM(s) Oral at bedtime  methocarbamol 500 milliGRAM(s) Oral two times a day  pantoprazole    Tablet 40 milliGRAM(s) Oral before breakfast  sacubitril 97 mG/valsartan 103 mG 1 Tablet(s) Oral two times a day  sodium bicarbonate 1300 milliGRAM(s) Oral three times a day  sodium chloride 0.65% Nasal 1 Spray(s) Both Nostrils two times a day  ticagrelor 90 milliGRAM(s) Oral every 12 hours    MEDICATIONS  (PRN):  aluminum hydroxide/magnesium hydroxide/simethicone Suspension 30 milliLiter(s) Oral every 4 hours PRN Dyspepsia  benzocaine/menthol Lozenge 1 Lozenge Oral every 2 hours PRN Sore Throat  dextrose Oral Gel 15 Gram(s) Oral once PRN Blood Glucose LESS THAN 70 milliGRAM(s)/deciliter  loperamide 2 milliGRAM(s) Oral two times a day PRN Diarrhea  ondansetron Injectable 4 milliGRAM(s) IV Push every 6 hours PRN Nausea and/or Vomiting      I&O's Summary    29 Apr 2025 07:01  -  30 Apr 2025 07:00  --------------------------------------------------------  IN: 1207 mL / OUT: 2850 mL / NET: -1643 mL        PHYSICAL EXAM:  Vital Signs Last 24 Hrs  T(C): 36.3 (30 Apr 2025 12:49), Max: 36.9 (30 Apr 2025 00:08)  T(F): 97.4 (30 Apr 2025 12:49), Max: 98.5 (30 Apr 2025 00:08)  HR: 78 (30 Apr 2025 12:49) (69 - 83)  BP: 144/74 (30 Apr 2025 12:49) (94/50 - 144/74)  BP(mean): 77 (29 Apr 2025 21:00) (77 - 77)  RR: 17 (30 Apr 2025 12:49) (17 - 19)  SpO2: 98% (30 Apr 2025 12:49) (97% - 99%)    Parameters below as of 30 Apr 2025 08:02  Patient On (Oxygen Delivery Method): room air        Pt lying in bed in no acute distress  Moist Mucus membranes  S1/S2 regular  CTABL, good air entry  Abdomen soft, non-tender, BS+  No LE edema       LABS:                        8.8    8.75  )-----------( 399      ( 29 Apr 2025 07:10 )             27.4     04-30    133[L]  |  94[L]  |  47.3[H]  ----------------------------<  149[H]  4.3   |  24.0  |  2.08[H]    Ca    8.2[L]      30 Apr 2025 05:22  Mg     2.0     04-30    TPro  6.4[L]  /  Alb  3.1[L]  /  TBili  0.6  /  DBili  x   /  AST  15  /  ALT  <5  /  AlkPhos  118  04-29          Urinalysis Basic - ( 30 Apr 2025 05:22 )    Color: x / Appearance: x / SG: x / pH: x  Gluc: 149 mg/dL / Ketone: x  / Bili: x / Urobili: x   Blood: x / Protein: x / Nitrite: x   Leuk Esterase: x / RBC: x / WBC x   Sq Epi: x / Non Sq Epi: x / Bacteria: x        CAPILLARY BLOOD GLUCOSE      POCT Blood Glucose.: 205 mg/dL (30 Apr 2025 12:16)  POCT Blood Glucose.: 182 mg/dL (30 Apr 2025 08:03)  POCT Blood Glucose.: 233 mg/dL (29 Apr 2025 21:03)  POCT Blood Glucose.: 180 mg/dL (29 Apr 2025 17:13)        RADIOLOGY & ADDITIONAL TESTS:  Results Reviewed:   Imaging Personally Reviewed:  Electrocardiogram Personally Reviewed:

## 2025-04-30 NOTE — PROGRESS NOTE ADULT - PROBLEM SELECTOR PLAN 3
- Baseline Cr ~1.6-1.7?  - Renal function now improving with diuresis.  - Avoid nephrotoxics  - Nephrology following.

## 2025-04-30 NOTE — PROGRESS NOTE ADULT - ASSESSMENT
74yoF with a history of coronary artery disease, heart failure, chronic kidney disease, hypertension, diabetes, and anemia who was recently hospitalized for NSTEMI with PCI and bacteremia who presented with dyspnea on exertion and weight gain. Continues on Bumex drip as per cardiology additional diuretics with metolazone / Bumex IV given 4/21. s/p repeat heart cath 4/22 with no need for MEMS recalibration however plan for upgarde to CRT-P vs CRT-D device 4/24, s/p Cardiac MR. Diuresis resumed by HF team 4/25, continue to monitor.     #Acute on chronic systolic heart failure  - Recent echocardiogram noted ejection fraction of 31% with multiple segmental abnormalities  - Diuresis as per HF guidance, started on bumex drip. HF following   - s/p Metolazone 4/21, with Bumex IV push   - Strict I/O, daily weights  - On metoprolol, Imdur 60 mg daily, spironolactone, Entresto full dose, hydralazine 50mg TID   - Heart Failure on board recs appreciated, s/p PPM device upgrade 4/24 without issue, functioning well now adjusting diuresis, recheck cardiomems this AM (4/27)  - Given patients recurrent UTI's / history of bacteremia, EP requesting ID input for potential abx's prior to device upgrade / implant, consulted, recs appreciated, no rec for prophylactic abx/ Pt on Keflex s/p CRT-D for 5 days   - s/p repeat Heart cath 4/22, no recalibration of Mems needed     #Chronic kidney disease  - Continue to monitor while on diuresis  - Nephro on board, will f/u recs    #Right knee pain  - knee xray showing effusion, possible in setting of fall  - c/w current pain regimen, adjust as needed   - CT knee result noted  - ortho consulted, sp aspiration fluid neg for crystals/infections  - ROM improving     #Fever  #UTI  resolved   s/p Abxs with Zosyn    #Diarrhea  resolved   - possibly abx related  - GI pcr neg, PRN imodium started    #Coronary artery disease  - Continue on aspirin, ticagrelor, and atorvastatin  - Recent cardiac catheterization with PCI, Brillinta dosage increased to 90 BID from 60 as per HF  - initial trop 140 -> 118  - pt with recurrent chest pain, high up in chest, trop downtrended   - c/w maalox, pain improves with GI cocktail, as above consideration for repeat heart cath this admission     #Hyponatremia improving   - Hypervolemic on presentation  - Na improving, On Bumex drip. Renal on board     #Diabetes  - Insulin coverage  - Close monitoring of blood glucose levels  - Gabapentin for neuropathy  - need Glucometer on discharge     DVT ppx: ASA/Brilinta     Dispo: On Bumex drip. Medically active for now

## 2025-04-30 NOTE — PROGRESS NOTE ADULT - SUBJECTIVE AND OBJECTIVE BOX
ADVANCED HEART FAILURE - PROGRESS NOTE  [address] [place] [address]  Office Phone: [phone]/Fax: [phone]  Service/On Call Phone [phone]  _______________________________________________________________________________________________________    Subjective:  - NAEO  - Patient is OOB to chair and denies any SOB at rest, orthopnea, PND, CP, palpitations, LH/dizziness. She has not tried ambulating.    Medications:  acetaminophen     Tablet .. 975 milliGRAM(s) Oral every 8 hours  aluminum hydroxide/magnesium hydroxide/simethicone Suspension 30 milliLiter(s) Oral every 4 hours PRN  aspirin  chewable 81 milliGRAM(s) Oral daily  atorvastatin 80 milliGRAM(s) Oral at bedtime  benzocaine/menthol Lozenge 1 Lozenge Oral every 2 hours PRN  buMETAnide Infusion 1 mG/Hr IV Continuous <Continuous>  carvedilol 6.25 milliGRAM(s) Oral every 12 hours  dextrose 5%. 1000 milliLiter(s) IV Continuous <Continuous>  dextrose 5%. 1000 milliLiter(s) IV Continuous <Continuous>  dextrose 50% Injectable 25 Gram(s) IV Push once  dextrose 50% Injectable 12.5 Gram(s) IV Push once  dextrose 50% Injectable 25 Gram(s) IV Push once  dextrose Oral Gel 15 Gram(s) Oral once PRN  epoetin kristina-epbx (RETACRIT) Injectable 90924 Unit(s) SubCutaneous once  gabapentin 400 milliGRAM(s) Oral daily  glucagon  Injectable 1 milliGRAM(s) IntraMuscular once  hydrALAZINE 50 milliGRAM(s) Oral every 8 hours  insulin glargine Injectable (LANTUS) 5 Unit(s) SubCutaneous at bedtime  insulin lispro (ADMELOG) corrective regimen sliding scale   SubCutaneous three times a day before meals  isosorbide   mononitrate ER Tablet (IMDUR) 60 milliGRAM(s) Oral daily  lidocaine   4% Patch 1 Patch Transdermal every 24 hours  loperamide 2 milliGRAM(s) Oral two times a day PRN  melatonin 3 milliGRAM(s) Oral at bedtime  methocarbamol 500 milliGRAM(s) Oral two times a day  metolazone 2.5 milliGRAM(s) Oral once  ondansetron Injectable 4 milliGRAM(s) IV Push every 6 hours PRN  pantoprazole    Tablet 40 milliGRAM(s) Oral before breakfast  sacubitril 97 mG/valsartan 103 mG 1 Tablet(s) Oral two times a day  sodium bicarbonate 1300 milliGRAM(s) Oral three times a day  sodium chloride 0.65% Nasal 1 Spray(s) Both Nostrils two times a day  sodium chloride 2% . 150 milliLiter(s) IV Continuous <Continuous>  ticagrelor 90 milliGRAM(s) Oral every 12 hours      ICU Vital Signs Last 24 Hrs  T(C): 36.4, Max: 36.9 ( @ 00:08)  HR: 69 (69 - 83)  BP: 106/56 (94/50 - 131/76)  BP(mean): 77 (77 - 77)  ABP: --  ABP(mean): --  RR: 17 (17 - 19)  SpO2: 99% (97% - 99%)    Weight in k.8 (25)  Weight in k.1 (25)      I&O's Summary Last 24 Hrs    IN: 1207 mL / OUT: 2850 mL / NET: -1643 mL      Tele: Paced in the 80s    Physical Exam:    General: No distress. Comfortable.  Neck: JVP elevated.  Respiratory: Clear to auscultation bilaterally  CV: RRR. Normal S1 and S2. No murmurs, rub, or gallops. Radial pulses normal.  Abdomen: Soft, non-distended, non-tender  Extremities: Warm, no edema  Neurology: Non-focal, alert and oriented times three.   Psych: Affect normal    Labs:    ( 25 @ 07:10 )               8.8    8.75  )--------( 399                  27.4     ( 25 @ 05:22 )     133  |  94  |  47.3  ---------------------<  149  4.3  |  24.0  |  2.08    Ca 8.2  Phos x   Mg 2.0    ( 25 @ 07:10 )  TPro  6.4  /  Alb  3.1  /  TBili  0.6  /  DBili  x   /  AST  15  /  ALT  <5  /  AlkPhos  118    ( 25 @ 16:15 )  TropHS 118   / CK x     / CKMB x      ( 25 @ 10:04 )  TropHS 140   / CK x     / CKMB x

## 2025-04-30 NOTE — PROGRESS NOTE ADULT - SUBJECTIVE AND OBJECTIVE BOX
NEPHROLOGY INTERVAL HPI/OVERNIGHT EVENTS:  pt comfortable  no acute sob noted    MEDICATIONS  (STANDING):  acetaminophen     Tablet .. 975 milliGRAM(s) Oral every 8 hours  aspirin  chewable 81 milliGRAM(s) Oral daily  atorvastatin 80 milliGRAM(s) Oral at bedtime  buMETAnide Infusion 1 mG/Hr (5 mL/Hr) IV Continuous <Continuous>  carvedilol 6.25 milliGRAM(s) Oral every 12 hours  dextrose 5%. 1000 milliLiter(s) (50 mL/Hr) IV Continuous <Continuous>  dextrose 5%. 1000 milliLiter(s) (100 mL/Hr) IV Continuous <Continuous>  dextrose 50% Injectable 25 Gram(s) IV Push once  dextrose 50% Injectable 12.5 Gram(s) IV Push once  dextrose 50% Injectable 25 Gram(s) IV Push once  epoetin kristina-epbx (RETACRIT) Injectable 54080 Unit(s) SubCutaneous once  gabapentin 400 milliGRAM(s) Oral daily  glucagon  Injectable 1 milliGRAM(s) IntraMuscular once  hydrALAZINE 50 milliGRAM(s) Oral every 8 hours  insulin glargine Injectable (LANTUS) 5 Unit(s) SubCutaneous at bedtime  insulin lispro (ADMELOG) corrective regimen sliding scale   SubCutaneous three times a day before meals  isosorbide   mononitrate ER Tablet (IMDUR) 60 milliGRAM(s) Oral daily  lidocaine   4% Patch 1 Patch Transdermal every 24 hours  melatonin 3 milliGRAM(s) Oral at bedtime  methocarbamol 500 milliGRAM(s) Oral two times a day  pantoprazole    Tablet 40 milliGRAM(s) Oral before breakfast  sacubitril 97 mG/valsartan 103 mG 1 Tablet(s) Oral two times a day  sodium bicarbonate 1300 milliGRAM(s) Oral three times a day  sodium chloride 0.65% Nasal 1 Spray(s) Both Nostrils two times a day  ticagrelor 90 milliGRAM(s) Oral every 12 hours    MEDICATIONS  (PRN):  aluminum hydroxide/magnesium hydroxide/simethicone Suspension 30 milliLiter(s) Oral every 4 hours PRN Dyspepsia  benzocaine/menthol Lozenge 1 Lozenge Oral every 2 hours PRN Sore Throat  dextrose Oral Gel 15 Gram(s) Oral once PRN Blood Glucose LESS THAN 70 milliGRAM(s)/deciliter  loperamide 2 milliGRAM(s) Oral two times a day PRN Diarrhea  ondansetron Injectable 4 milliGRAM(s) IV Push every 6 hours PRN Nausea and/or Vomiting      Allergies    No Known Allergies        Vital Signs Last 24 Hrs  T(C): 36.4 (30 Apr 2025 08:02), Max: 36.9 (30 Apr 2025 00:08)  T(F): 97.6 (30 Apr 2025 08:02), Max: 98.5 (30 Apr 2025 00:08)  HR: 69 (30 Apr 2025 08:02) (69 - 83)  BP: 106/56 (30 Apr 2025 08:02) (94/50 - 131/76)  BP(mean): 77 (29 Apr 2025 21:00) (77 - 77)  RR: 17 (30 Apr 2025 08:02) (17 - 19)  SpO2: 99% (30 Apr 2025 08:02) (97% - 99%)    Parameters below as of 30 Apr 2025 08:02  Patient On (Oxygen Delivery Method): room air        PHYSICAL EXAM:  GENERAL: Fatigued  HEENT: No periorbital edema  ENMT:  Moist mucous membranes  NECK: Supple, No JVD  NERVOUS SYSTEM:  Alert & Oriented X3, intact and symmetric  CHEST/LUNG: Diminished BS noted  HEART: Regular rate and rhythm; No rub  ABDOMEN: Soft, Nontender, +BS  EXTREMITIES: + dependent edema less  SKIN: No rashes or lesions  LABS:                        8.8    8.75  )-----------( 399      ( 29 Apr 2025 07:10 )             27.4     04-30    133[L]  |  94[L]  |  47.3[H]  ----------------------------<  149[H]  4.3   |  24.0  |  2.08[H]    Ca    8.2[L]      30 Apr 2025 05:22  Mg     2.0     04-30    TPro  6.4[L]  /  Alb  3.1[L]  /  TBili  0.6  /  DBili  x   /  AST  15  /  ALT  <5  /  AlkPhos  118  04-29      Urinalysis Basic - ( 30 Apr 2025 05:22 )    Color: x / Appearance: x / SG: x / pH: x  Gluc: 149 mg/dL / Ketone: x  / Bili: x / Urobili: x   Blood: x / Protein: x / Nitrite: x   Leuk Esterase: x / RBC: x / WBC x   Sq Epi: x / Non Sq Epi: x / Bacteria: x      Magnesium: 2.0 mg/dL (04-30 @ 05:22)      RADIOLOGY & ADDITIONAL TESTS:  < from: Xray Chest 1 View AP/PA. (04.25.25 @ 06:40) >    ACC: [id number] EXAM:  XR CHEST AP OR PA 1V   ORDERED BY: BLU CANALES     PROCEDURE DATE:  04/25/2025          INTERPRETATION:  Exam:XR CHEST    clinical history:s/p CRT- D upgrade    Cardiac device overlies left chest. No acute infiltrates. No pneumothorax.    IMPRESSION: No significant change from yesterday    < end of copied text >

## 2025-04-30 NOTE — PROGRESS NOTE ADULT - ASSESSMENT
Gen Cards: Dr. Allen  HF: Dr. Polanco  IC: Dr. Bajwa    75 y/o female with PMH of ICM/HFrEF (LVEF 35-40%), CAD s/p PCI, Mobitz II s/p PPM (Nereyda, MDT), HTN, HLD, PAD with chronic right LE wound, DM2, and CKD3, who was BIBA from CHF clinic due to hypervolemia, weight gain of ~20 lbs in the past 9 days despite escalation of PO diuretics.      She was recently admitted on 3/19/25 with c/o CP and found to have NSTEMI. She underwent LHC on 3/21 which showed mid-distal LAD severe stenosis, OM1 and OM2 severe stenosis, mid ISR 50%, distal ISR 99%, s/p PCI with 1 ZEINAB to RCA.  On 3/22 she had a fever with leukocytosis and her blood cultures were positive for E. Coli/ESBL. Her CT A/P showed left pyelonephritis. She also had an BRADY for which her Entresto was held. She was discharged home 3/31 and her CardioMEMS was not at goal and had been rising. She was advised to increase Torsemide 40 mg daily to twice daily on 4/4. Despite this, she has continued to gain weight and reports poor UOP response.     Admission labwork: Na 125, K 4.6, BUN 37.7, Cr 1.94, eGFR 27, Mg 2.3, proBNP 32125!  CXR: increased interstitial markings bilaterally, cardiomegaly.   On 4/10 she developed a fever, tmax 100.4F. RVP negative, empirically started on IV Zosyn.  4/13 s/p right knee fluid aspiration (s/p recent trauma/fall).  4/15 initiated on continuous Bumex infusion for inadequate response to intermittent dosing.  4/21: Received 1 dose of metolazone 5 mg and Bumex gtt switched to 4 mg IV BID.  4/24: s/p CRT-D    Prior Cardiac Testing:  RHC 4/22/25: RA 18, RV 63/18, PA 63/25/37, PCW 22, PA sat 37%, Jose CO/CI 2.5/1.5, Thermo CO 2.5, SVR 2800 dsc, PVR 4.5 WILLARD. No CardioMEMS recalibration needed.     TTE 4/21/25: LVEF 20-25%, LVIDd 4.3 cm, LVOT VTI 12.6 cm, grade 2 DD, mild RVE with mild RV dysfunction, mild-mod AS, mod MR/TR, mild AL, est PASP 57 mmHg, est RAP 8 mmHg.    TTE 3/21/25: LVEF 25-30%, grade II DD, normal RV size/function, severe LAE, moderate MR, mild TR, PASP 58 mmHg ( RAP 8 mmHg), no evidence of LV thrombus, multiple segmental abnormalities/regional WMA.    CardioMEMS PAD (goal 14):  4/30: 26  4/29: 24  4/28: 26  4/25: 24  4/22: 29-30  4/20: 31  4/18: 27  4/17: 27 (in chair ~30 degrees).  4/14: 30-33  4/11: 32  4/9: 32  3/27: 26  3/24: 23  3/21: 22

## 2025-04-30 NOTE — PROGRESS NOTE ADULT - ASSESSMENT
73 yo female (known to our service) + CKD(III) + ICM (EF 25-30%) admitted with decompensated CHF  Pt clinically improved on current Rx  - pt to continue Bumex infusion and Entresto as per cardiology  - can consider RRT to augment UF if medical management proves inadequate   - follow labs, accept azotemia

## 2025-04-30 NOTE — PROGRESS NOTE ADULT - PROBLEM SELECTOR PLAN 1
- ICM, most recent TTE on 4/21/25 showed further decline of LVEF to 20-25%  - Clinically appears hypervolemic on exam w/warm extremities.  - Serum pro-BNP > 70k on 4/21. Repeat pro-BNP 43k on 4/28.  - Hyponatremia improving with diuresis. Repeat BMP at 4pm.  - Diuretics: Continue Bumex infusion at 1 mg/hr (increased yesterday) and will give another dose of hypertonic saline 2% bolus (150 mL over 30 minutes) with a dose of metolazone 2.5 mg x1.  - GDMT: Continue Coreg 6.25 mg BID, Entresto  mg BID, and hydralazine 50 mg TID. Aldactone stopped for hyperkalemia. When euvolemic, will wean hydralazine and increase uptitrate Coreg. Continue Imdur 60 mg daily for anginal pain. No Farxiga 2/2 h/o pyelonephritis.   - Device: h/o Micra PPM with chronic . She is now s/p CRT-D on 4/24.  - Low Na, 1.5L FR diet recommended  - Please document strict I&Os and daily standing weight.  - Needs daily PT. Patient has not tried ambulating in days. Very deconditioned.

## 2025-05-01 LAB
ANION GAP SERPL CALC-SCNC: 19 MMOL/L — HIGH (ref 5–17)
BUN SERPL-MCNC: 49.8 MG/DL — HIGH (ref 8–20)
CALCIUM SERPL-MCNC: 8.7 MG/DL — SIGNIFICANT CHANGE UP (ref 8.4–10.5)
CHLORIDE SERPL-SCNC: 93 MMOL/L — LOW (ref 96–108)
CO2 SERPL-SCNC: 24 MMOL/L — SIGNIFICANT CHANGE UP (ref 22–29)
CREAT SERPL-MCNC: 2.14 MG/DL — HIGH (ref 0.5–1.3)
EGFR: 24 ML/MIN/1.73M2 — LOW
EGFR: 24 ML/MIN/1.73M2 — LOW
GLUCOSE BLDC GLUCOMTR-MCNC: 161 MG/DL — HIGH (ref 70–99)
GLUCOSE BLDC GLUCOMTR-MCNC: 176 MG/DL — HIGH (ref 70–99)
GLUCOSE BLDC GLUCOMTR-MCNC: 186 MG/DL — HIGH (ref 70–99)
GLUCOSE BLDC GLUCOMTR-MCNC: 201 MG/DL — HIGH (ref 70–99)
GLUCOSE SERPL-MCNC: 123 MG/DL — HIGH (ref 70–99)
MAGNESIUM SERPL-MCNC: 2 MG/DL — SIGNIFICANT CHANGE UP (ref 1.8–2.6)
POTASSIUM SERPL-MCNC: 4.1 MMOL/L — SIGNIFICANT CHANGE UP (ref 3.5–5.3)
POTASSIUM SERPL-SCNC: 4.1 MMOL/L — SIGNIFICANT CHANGE UP (ref 3.5–5.3)
SODIUM SERPL-SCNC: 136 MMOL/L — SIGNIFICANT CHANGE UP (ref 135–145)

## 2025-05-01 PROCEDURE — 99233 SBSQ HOSP IP/OBS HIGH 50: CPT

## 2025-05-01 RX ORDER — ALBUMIN (HUMAN) 12.5 G/50ML
50 INJECTION, SOLUTION INTRAVENOUS ONCE
Refills: 0 | Status: COMPLETED | OUTPATIENT
Start: 2025-05-01 | End: 2025-05-01

## 2025-05-01 RX ADMIN — Medication 1 SPRAY(S): at 17:08

## 2025-05-01 RX ADMIN — ISOSORBIDE MONONITRATE 60 MILLIGRAM(S): 60 TABLET, EXTENDED RELEASE ORAL at 12:34

## 2025-05-01 RX ADMIN — Medication 50 MILLIGRAM(S): at 05:42

## 2025-05-01 RX ADMIN — ATORVASTATIN CALCIUM 80 MILLIGRAM(S): 80 TABLET, FILM COATED ORAL at 21:48

## 2025-05-01 RX ADMIN — Medication 1 SPRAY(S): at 05:44

## 2025-05-01 RX ADMIN — Medication 975 MILLIGRAM(S): at 13:37

## 2025-05-01 RX ADMIN — GABAPENTIN 400 MILLIGRAM(S): 400 CAPSULE ORAL at 12:34

## 2025-05-01 RX ADMIN — Medication 1300 MILLIGRAM(S): at 13:38

## 2025-05-01 RX ADMIN — METHOCARBAMOL 500 MILLIGRAM(S): 500 TABLET, FILM COATED ORAL at 05:42

## 2025-05-01 RX ADMIN — SACUBITRIL AND VALSARTAN 1 TABLET(S): 6; 6 PELLET ORAL at 05:41

## 2025-05-01 RX ADMIN — SACUBITRIL AND VALSARTAN 1 TABLET(S): 6; 6 PELLET ORAL at 17:06

## 2025-05-01 RX ADMIN — Medication 975 MILLIGRAM(S): at 05:42

## 2025-05-01 RX ADMIN — INSULIN LISPRO 2: 100 INJECTION, SOLUTION INTRAVENOUS; SUBCUTANEOUS at 17:06

## 2025-05-01 RX ADMIN — Medication 81 MILLIGRAM(S): at 12:34

## 2025-05-01 RX ADMIN — Medication 1300 MILLIGRAM(S): at 21:47

## 2025-05-01 RX ADMIN — INSULIN LISPRO 2: 100 INJECTION, SOLUTION INTRAVENOUS; SUBCUTANEOUS at 12:33

## 2025-05-01 RX ADMIN — Medication 3 MILLIGRAM(S): at 21:48

## 2025-05-01 RX ADMIN — BUMETANIDE 5 MG/HR: 1 TABLET ORAL at 12:57

## 2025-05-01 RX ADMIN — Medication 1300 MILLIGRAM(S): at 05:42

## 2025-05-01 RX ADMIN — Medication 40 MILLIGRAM(S): at 05:42

## 2025-05-01 RX ADMIN — LIDOCAINE HYDROCHLORIDE 1 PATCH: 20 JELLY TOPICAL at 12:34

## 2025-05-01 RX ADMIN — Medication 25 MILLIGRAM(S): at 21:48

## 2025-05-01 RX ADMIN — Medication 25 MILLIGRAM(S): at 13:37

## 2025-05-01 RX ADMIN — CARVEDILOL 6.25 MILLIGRAM(S): 3.12 TABLET, FILM COATED ORAL at 05:42

## 2025-05-01 RX ADMIN — INSULIN GLARGINE-YFGN 5 UNIT(S): 100 INJECTION, SOLUTION SUBCUTANEOUS at 22:04

## 2025-05-01 RX ADMIN — BUMETANIDE 5 MG/HR: 1 TABLET ORAL at 21:47

## 2025-05-01 RX ADMIN — ALBUMIN (HUMAN) 50 MILLILITER(S): 12.5 INJECTION, SOLUTION INTRAVENOUS at 00:19

## 2025-05-01 RX ADMIN — Medication 975 MILLIGRAM(S): at 14:30

## 2025-05-01 RX ADMIN — Medication 975 MILLIGRAM(S): at 21:48

## 2025-05-01 RX ADMIN — METHOCARBAMOL 500 MILLIGRAM(S): 500 TABLET, FILM COATED ORAL at 17:06

## 2025-05-01 RX ADMIN — TICAGRELOR 90 MILLIGRAM(S): 90 TABLET ORAL at 05:42

## 2025-05-01 RX ADMIN — CARVEDILOL 6.25 MILLIGRAM(S): 3.12 TABLET, FILM COATED ORAL at 17:06

## 2025-05-01 RX ADMIN — INSULIN LISPRO 2: 100 INJECTION, SOLUTION INTRAVENOUS; SUBCUTANEOUS at 08:21

## 2025-05-01 RX ADMIN — TICAGRELOR 90 MILLIGRAM(S): 90 TABLET ORAL at 17:06

## 2025-05-01 NOTE — PROGRESS NOTE ADULT - ASSESSMENT
74yoF with a history of coronary artery disease, heart failure, chronic kidney disease, hypertension, diabetes, and anemia who was recently hospitalized for NSTEMI with PCI and bacteremia who presented with dyspnea on exertion and weight gain. Continues on Bumex drip as per cardiology additional diuretics with metolazone / Bumex IV given 4/21. s/p repeat heart cath 4/22 with no need for MEMS recalibration however plan for upgarde to CRT-P vs CRT-D device 4/24, s/p Cardiac MR. Diuresis resumed by HF team 4/25, continue to monitor.     #Acute on chronic systolic heart failure  - Recent echocardiogram noted ejection fraction of 31% with multiple segmental abnormalities  - Diuresis as per HF guidance, started on bumex drip. HF following   - s/p Metolazone 4/21, with Bumex IV push   - Strict I/O, daily weights  - On metoprolol, Imdur 60 mg daily, spironolactone, Entresto full dose, hydralazine changed to 25mg tid due to hypotension  - Heart Failure on board recs appreciated, s/p PPM device upgrade 4/24 without issue, functioning well now adjusting diuresis, check cardiomems qd   - Given patients recurrent UTI's / history of bacteremia, EP requesting ID input for potential abx's prior to device upgrade / implant, consulted, recs appreciated, no rec for prophylactic abx/ Pt on Keflex s/p CRT-D for 5 days   - s/p repeat Heart cath 4/22, no recalibration of Mems needed     #Chronic kidney disease  - Continue to monitor while on diuresis  - Nephro on board, will f/u recs    #Right knee pain  - knee xray showing effusion, possible in setting of fall  - c/w current pain regimen, adjust as needed   - CT knee result noted  - ortho consulted, sp aspiration fluid neg for crystals/infections  - ROM improving     #Fever  #UTI  resolved   s/p Abxs with Zosyn    #Diarrhea  resolved   - possibly abx related  - GI pcr neg, PRN imodium started    #Coronary artery disease  - Continue on aspirin, ticagrelor, and atorvastatin  - Recent cardiac catheterization with PCI, Brillinta dosage increased to 90 BID from 60 as per HF  - initial trop 140 -> 118  - pt with recurrent chest pain, high up in chest, trop downtrended   - c/w maalox, pain improves with GI cocktail, as above consideration for repeat heart cath this admission     #Hyponatremia improving   - Hypervolemic on presentation  - Na improving, On Bumex drip. Renal on board     #Diabetes  - Insulin coverage  - Close monitoring of blood glucose levels  - Gabapentin for neuropathy  - need Glucometer on discharge     DVT ppx: ASA/Brilinta     Dispo: On Bumex drip. Medically active for now

## 2025-05-01 NOTE — CHART NOTE - NSCHARTNOTEFT_GEN_A_CORE
*Incomplete note, full note to follow    Called by RN that patient's BP was 88/40, other vital stable. Patient is asymptomatic.   -Will give albumin 25% 50mL Called by RN for BP 88/40    Subjective & Objective:   This is a 75 y/o female with PMHx of ICM/HFrEF, CAD s/p PCI, Mobitz II s/p PPM, HTN, HLD, PAD with chronic right LE wound, DM2, and CKD3, who was BIBA from CHF clinic due to hypervolemia. She was recently admitted on 3/19/25 with c/o CP and found to have NSTEMI. She underwent LHC on 3/21 which showed mid-distal LAD severe stenosis, OM1 and OM2 severe stenosis, mid ISR 50%, distal ISR 99%, s/p PCI with 1 ZEINAB to RCA.  On 3/22 she had a fever with leukocytosis and her blood cultures were positive for E. Coli/ESBL, treated with Ertapenam. Found to have left pyelonephritis on CT A/P and labs were significant for BRADY. She was discharged home 3/31. Patient is currently being treated with bumex gtt, as well as coreg, entresto, hydralazine, imdur.   Patient seen and examined at bedside, RN translating. She is awake and alert at baseline mentation in no acute distress. Patient c/o dizziness, no other complaints. BP 88/40, other vitals stable. Albumin on 4/29 was 3.1. Patient does not appear hypervolemic on exam. At 21:00 patient's bp was 103/59 and pm hydralazine was held. Per Heart failure note cardio MEMS PAD on 4/30 was 26, with goal of 14.       Vital Signs Last 24 Hrs  T(F): 98 (01 May 2025 00:10)  HR: 76 (01 May 2025 00:10) (69 - 82)  BP: 88/40 (01 May 2025 00:10) (88/40 - 154/70)  RR: 18 (01 May 2025 00:10) (16 - 18)  SpO2: 93% (01 May 2025 00:10) (93% - 100%)    Parameters below as of 01 May 2025 00:10  Patient On (Oxygen Delivery Method): room air        PHYSICAL EXAM:   GENERAL: no acute distress  RESPIRATORY: Diminished, no tachypnea or increased wob  CARDIOVASCULAR: Regular rate and rhythm, murmur appreciated  ABDOMINAL: soft, non-tender, non-distended, positive bowel sounds   EXTREMITIES: no clubbing, cyanosis, or edema  NEUROLOGICAL: alert and oriented x 3, non-focal      Assessment & Plan: Pt is a 74y Female with PMH ICM/HFrEF, CAD s/p PCI, Mobitz II s/p PPM, HTN, HLD, PAD with chronic right LE wound, DM2, and CKD3 admitted for acute on chronic HFrEF.  Pt now with BP 88/40.    1) Hypotension    - Albumin 25% 50mL x1, recheck bp after  - Last Echo: 4/21 EF 20-25%  - Will continue to follow, RN to call with any changes

## 2025-05-01 NOTE — PROVIDER CONTACT NOTE (OTHER) - ACTION/TREATMENT ORDERED:
Administer morphine and continue to monitor for signs and symptoms of chest pain.
250mL NS
Push back to 0800 and reassess.
Albumin and PA to bedside.
BP okay. Cont to monitor
EKG, CXR ordered. Please give maalox and scheduled tylenol for pain and temp.    2238 CP only partially relieved with po tylenol previously given, 1mg IV morphine ordered.
Hold and reassess with midnight vitals
Push to 0800 and consult MD in the morning about BP medication dosing.
Zofran

## 2025-05-01 NOTE — PROGRESS NOTE ADULT - ASSESSMENT
75 yo female (known to our service) + CKD(III) + ICM (EF 25-30%) admitted with decompensated CHF  Pt clinically improved on current Rx  - pt to continue Bumex infusion as per cardiology and accept azotemia  - cont Entresto as per cardiology  - no current need for RRT   - follow labs  - I spoke to son who was visiting

## 2025-05-01 NOTE — PROGRESS NOTE ADULT - SUBJECTIVE AND OBJECTIVE BOX
Mariela Nelson MD (Available on netTALK)  [facility] Division of Hospital Medicine    Chief Complaint:  Sob    SUBJECTIVE / OVERNIGHT EVENTS:  Pt seen and examined at bedside. Denies any complaints.     Patient denies chest pain, SOB, abd pain, N/V, fever, chills, dysuria or any other complaints. All remainder ROS negative.     INTERVAL EVENTS:  -Pt hemodynamically stable with no overnight events.   -On Bumex drip. Cr improving  -Hypotensive overnight, will reduce hydralazine to 25mg tid     MEDICATIONS  (STANDING):  acetaminophen     Tablet .. 975 milliGRAM(s) Oral every 8 hours  aspirin  chewable 81 milliGRAM(s) Oral daily  atorvastatin 80 milliGRAM(s) Oral at bedtime  buMETAnide Infusion 1 mG/Hr (5 mL/Hr) IV Continuous <Continuous>  carvedilol 6.25 milliGRAM(s) Oral every 12 hours  dextrose 5%. 1000 milliLiter(s) (50 mL/Hr) IV Continuous <Continuous>  dextrose 5%. 1000 milliLiter(s) (100 mL/Hr) IV Continuous <Continuous>  dextrose 50% Injectable 25 Gram(s) IV Push once  dextrose 50% Injectable 12.5 Gram(s) IV Push once  dextrose 50% Injectable 25 Gram(s) IV Push once  epoetin kristina-epbx (RETACRIT) Injectable 18733 Unit(s) SubCutaneous once  gabapentin 400 milliGRAM(s) Oral daily  glucagon  Injectable 1 milliGRAM(s) IntraMuscular once  hydrALAZINE 25 milliGRAM(s) Oral every 8 hours  insulin glargine Injectable (LANTUS) 5 Unit(s) SubCutaneous at bedtime  insulin lispro (ADMELOG) corrective regimen sliding scale   SubCutaneous three times a day before meals  isosorbide   mononitrate ER Tablet (IMDUR) 60 milliGRAM(s) Oral daily  lidocaine   4% Patch 1 Patch Transdermal every 24 hours  melatonin 3 milliGRAM(s) Oral at bedtime  methocarbamol 500 milliGRAM(s) Oral two times a day  pantoprazole    Tablet 40 milliGRAM(s) Oral before breakfast  sacubitril 97 mG/valsartan 103 mG 1 Tablet(s) Oral two times a day  sodium bicarbonate 1300 milliGRAM(s) Oral three times a day  sodium chloride 0.65% Nasal 1 Spray(s) Both Nostrils two times a day  ticagrelor 90 milliGRAM(s) Oral every 12 hours    MEDICATIONS  (PRN):  aluminum hydroxide/magnesium hydroxide/simethicone Suspension 30 milliLiter(s) Oral every 4 hours PRN Dyspepsia  benzocaine/menthol Lozenge 1 Lozenge Oral every 2 hours PRN Sore Throat  dextrose Oral Gel 15 Gram(s) Oral once PRN Blood Glucose LESS THAN 70 milliGRAM(s)/deciliter  loperamide 2 milliGRAM(s) Oral two times a day PRN Diarrhea  ondansetron Injectable 4 milliGRAM(s) IV Push every 6 hours PRN Nausea and/or Vomiting      I&O's Summary    30 Apr 2025 07:01  -  01 May 2025 07:00  --------------------------------------------------------  IN: 1045 mL / OUT: 3100 mL / NET: -2055 mL        PHYSICAL EXAM:  Vital Signs Last 24 Hrs  T(C): 36.7 (01 May 2025 07:24), Max: 36.7 (30 Apr 2025 16:50)  T(F): 98 (01 May 2025 07:24), Max: 98.1 (30 Apr 2025 21:01)  HR: 74 (01 May 2025 07:24) (70 - 82)  BP: 102/61 (01 May 2025 07:24) (88/40 - 154/70)  BP(mean): --  RR: 18 (01 May 2025 07:24) (16 - 18)  SpO2: 93% (01 May 2025 07:24) (93% - 100%)    Parameters below as of 01 May 2025 07:24  Patient On (Oxygen Delivery Method): room air        Pt lying in bed in no acute distress  Moist Mucus membranes, no JVD  S1/S2 regular, no murmurs  CTABL, good air entry  Abdomen soft, non-tender, BS+  No LE edema       LABS:    05-01    136  |  93[L]  |  49.8[H]  ----------------------------<  123[H]  4.1   |  24.0  |  2.14[H]    Ca    8.7      01 May 2025 05:10  Mg     2.0     05-01            Urinalysis Basic - ( 01 May 2025 05:10 )    Color: x / Appearance: x / SG: x / pH: x  Gluc: 123 mg/dL / Ketone: x  / Bili: x / Urobili: x   Blood: x / Protein: x / Nitrite: x   Leuk Esterase: x / RBC: x / WBC x   Sq Epi: x / Non Sq Epi: x / Bacteria: x        CAPILLARY BLOOD GLUCOSE      POCT Blood Glucose.: 176 mg/dL (01 May 2025 08:00)  POCT Blood Glucose.: 112 mg/dL (30 Apr 2025 21:32)  POCT Blood Glucose.: 193 mg/dL (30 Apr 2025 16:47)  POCT Blood Glucose.: 205 mg/dL (30 Apr 2025 12:16)        RADIOLOGY & ADDITIONAL TESTS:  Results Reviewed:   Imaging Personally Reviewed:  Electrocardiogram Personally Reviewed:

## 2025-05-01 NOTE — PROVIDER CONTACT NOTE (OTHER) - NAME OF MD/NP/PA/DO NOTIFIED:
ALEXIS Cain
ALEXIS Salcedo
ALEXIS Cho
ALEXIS Cain
ALEXIS Cho
ALEXIS Cho
ALEXIS Gómez
Attending PA
ALEXIS Cho

## 2025-05-01 NOTE — PROGRESS NOTE ADULT - SUBJECTIVE AND OBJECTIVE BOX
NEPHROLOGY INTERVAL HPI/OVERNIGHT EVENTS:  pt resting comfortably  no overnight issues documented    MEDICATIONS  (STANDING):  acetaminophen     Tablet .. 975 milliGRAM(s) Oral every 8 hours  aspirin  chewable 81 milliGRAM(s) Oral daily  atorvastatin 80 milliGRAM(s) Oral at bedtime  buMETAnide Infusion 1 mG/Hr (5 mL/Hr) IV Continuous <Continuous>  carvedilol 6.25 milliGRAM(s) Oral every 12 hours  dextrose 5%. 1000 milliLiter(s) (50 mL/Hr) IV Continuous <Continuous>  dextrose 5%. 1000 milliLiter(s) (100 mL/Hr) IV Continuous <Continuous>  dextrose 50% Injectable 25 Gram(s) IV Push once  dextrose 50% Injectable 12.5 Gram(s) IV Push once  dextrose 50% Injectable 25 Gram(s) IV Push once  epoetin kristina-epbx (RETACRIT) Injectable 88533 Unit(s) SubCutaneous once  gabapentin 400 milliGRAM(s) Oral daily  glucagon  Injectable 1 milliGRAM(s) IntraMuscular once  hydrALAZINE 50 milliGRAM(s) Oral every 8 hours  insulin glargine Injectable (LANTUS) 5 Unit(s) SubCutaneous at bedtime  insulin lispro (ADMELOG) corrective regimen sliding scale   SubCutaneous three times a day before meals  isosorbide   mononitrate ER Tablet (IMDUR) 60 milliGRAM(s) Oral daily  lidocaine   4% Patch 1 Patch Transdermal every 24 hours  melatonin 3 milliGRAM(s) Oral at bedtime  methocarbamol 500 milliGRAM(s) Oral two times a day  pantoprazole    Tablet 40 milliGRAM(s) Oral before breakfast  sacubitril 97 mG/valsartan 103 mG 1 Tablet(s) Oral two times a day  sodium bicarbonate 1300 milliGRAM(s) Oral three times a day  sodium chloride 0.65% Nasal 1 Spray(s) Both Nostrils two times a day  ticagrelor 90 milliGRAM(s) Oral every 12 hours    MEDICATIONS  (PRN):  aluminum hydroxide/magnesium hydroxide/simethicone Suspension 30 milliLiter(s) Oral every 4 hours PRN Dyspepsia  benzocaine/menthol Lozenge 1 Lozenge Oral every 2 hours PRN Sore Throat  dextrose Oral Gel 15 Gram(s) Oral once PRN Blood Glucose LESS THAN 70 milliGRAM(s)/deciliter  loperamide 2 milliGRAM(s) Oral two times a day PRN Diarrhea  ondansetron Injectable 4 milliGRAM(s) IV Push every 6 hours PRN Nausea and/or Vomiting      Allergies    No Known Allergies    Intolerances            Vital Signs Last 24 Hrs  T(C): 36.7 (01 May 2025 07:24), Max: 36.7 (30 Apr 2025 16:50)  T(F): 98 (01 May 2025 07:24), Max: 98.1 (30 Apr 2025 21:01)  HR: 74 (01 May 2025 07:24) (70 - 82)  BP: 102/61 (01 May 2025 07:24) (88/40 - 154/70)  BP(mean): --  RR: 18 (01 May 2025 07:24) (16 - 18)  SpO2: 93% (01 May 2025 07:24) (93% - 100%)    Parameters below as of 01 May 2025 07:24  Patient On (Oxygen Delivery Method): room air        PHYSICAL EXAM:  GENERAL: Weak  HEENT: No periorbital edema  ENMT:  Moist mucous membranes  NECK: Supple, No JVD  NERVOUS SYSTEM:  Alert & Oriented X3, intact and symmetric  CHEST/LUNG: Diminished BS noted  HEART: Regular rate and rhythm; No rub  ABDOMEN: Soft, Nontender, +BS  EXTREMITIES: + dependent edema less  SKIN: No rashes or lesions      LABS:    05-01    136  |  93[L]  |  49.8[H]  ----------------------------<  123[H]  4.1   |  24.0  |  2.14[H]    Ca    8.7      01 May 2025 05:10  Mg     2.0     05-01        Urinalysis Basic - ( 01 May 2025 05:10 )    Color: x / Appearance: x / SG: x / pH: x  Gluc: 123 mg/dL / Ketone: x  / Bili: x / Urobili: x   Blood: x / Protein: x / Nitrite: x   Leuk Esterase: x / RBC: x / WBC x   Sq Epi: x / Non Sq Epi: x / Bacteria: x      Magnesium: 2.0 mg/dL (05-01 @ 05:10)      RADIOLOGY & ADDITIONAL TESTS:

## 2025-05-01 NOTE — PROVIDER CONTACT NOTE (OTHER) - REASON
Chest Pain
Due for BP meds but was hypotensive overnight
Hypotension
/59. Due for 50mg hydralazine with no parameters
One episode of vomiting
BP 88/50
BP 97/57, due for BP meds
BP 92/52 MAP 65
Chest Pain

## 2025-05-01 NOTE — PROVIDER CONTACT NOTE (OTHER) - ASSESSMENT
Manual BP 88/50. Denies symptoms
Asymptomatic
BP 88/40 manually. Denies any symptoms besides epigastric pain that is normal for her.
Pt lying in bed, does not appear in distress, endorses CP, denies SOB, dizziness, numbness and/or tingling. VSS except rectal temp of 100.9f.   ID #[id number] utilized for [place]i translation

## 2025-05-01 NOTE — PROVIDER CONTACT NOTE (OTHER) - DATE AND TIME:
01-May-2025 00:12
13-Apr-2025 19:57
27-Apr-2025 05:29
28-Apr-2025 00:26
30-Apr-2025 21:14
20-Apr-2025 22:42
26-Apr-2025 00:18
26-Apr-2025 05:21
19-Apr-2025 21:25

## 2025-05-02 LAB
ANION GAP SERPL CALC-SCNC: 16 MMOL/L — SIGNIFICANT CHANGE UP (ref 5–17)
BUN SERPL-MCNC: 51.3 MG/DL — HIGH (ref 8–20)
CALCIUM SERPL-MCNC: 8.3 MG/DL — LOW (ref 8.4–10.5)
CHLORIDE SERPL-SCNC: 89 MMOL/L — LOW (ref 96–108)
CO2 SERPL-SCNC: 25 MMOL/L — SIGNIFICANT CHANGE UP (ref 22–29)
CREAT SERPL-MCNC: 2.5 MG/DL — HIGH (ref 0.5–1.3)
EGFR: 20 ML/MIN/1.73M2 — LOW
EGFR: 20 ML/MIN/1.73M2 — LOW
GLUCOSE BLDC GLUCOMTR-MCNC: 168 MG/DL — HIGH (ref 70–99)
GLUCOSE BLDC GLUCOMTR-MCNC: 177 MG/DL — HIGH (ref 70–99)
GLUCOSE BLDC GLUCOMTR-MCNC: 219 MG/DL — HIGH (ref 70–99)
GLUCOSE BLDC GLUCOMTR-MCNC: 225 MG/DL — HIGH (ref 70–99)
GLUCOSE SERPL-MCNC: 169 MG/DL — HIGH (ref 70–99)
POTASSIUM SERPL-MCNC: 4.2 MMOL/L — SIGNIFICANT CHANGE UP (ref 3.5–5.3)
POTASSIUM SERPL-SCNC: 4.2 MMOL/L — SIGNIFICANT CHANGE UP (ref 3.5–5.3)
SODIUM SERPL-SCNC: 130 MMOL/L — LOW (ref 135–145)

## 2025-05-02 PROCEDURE — 99233 SBSQ HOSP IP/OBS HIGH 50: CPT

## 2025-05-02 PROCEDURE — 99232 SBSQ HOSP IP/OBS MODERATE 35: CPT

## 2025-05-02 RX ORDER — BUMETANIDE 1 MG/1
4 TABLET ORAL
Refills: 0 | Status: DISCONTINUED | OUTPATIENT
Start: 2025-05-03 | End: 2025-05-03

## 2025-05-02 RX ORDER — SODIUM CHLORIDE 3 G/100ML
150 INJECTION, SOLUTION INTRAVENOUS
Refills: 0 | Status: COMPLETED | OUTPATIENT
Start: 2025-05-02 | End: 2025-05-02

## 2025-05-02 RX ORDER — BUMETANIDE 1 MG/1
4 TABLET ORAL ONCE
Refills: 0 | Status: COMPLETED | OUTPATIENT
Start: 2025-05-02 | End: 2025-05-02

## 2025-05-02 RX ADMIN — INSULIN LISPRO 4: 100 INJECTION, SOLUTION INTRAVENOUS; SUBCUTANEOUS at 12:56

## 2025-05-02 RX ADMIN — Medication 975 MILLIGRAM(S): at 15:40

## 2025-05-02 RX ADMIN — BUMETANIDE 4 MILLIGRAM(S): 1 TABLET ORAL at 14:52

## 2025-05-02 RX ADMIN — Medication 25 MILLIGRAM(S): at 06:36

## 2025-05-02 RX ADMIN — INSULIN LISPRO 2: 100 INJECTION, SOLUTION INTRAVENOUS; SUBCUTANEOUS at 08:39

## 2025-05-02 RX ADMIN — CARVEDILOL 6.25 MILLIGRAM(S): 3.12 TABLET, FILM COATED ORAL at 06:36

## 2025-05-02 RX ADMIN — TICAGRELOR 90 MILLIGRAM(S): 90 TABLET ORAL at 06:36

## 2025-05-02 RX ADMIN — ATORVASTATIN CALCIUM 80 MILLIGRAM(S): 80 TABLET, FILM COATED ORAL at 22:16

## 2025-05-02 RX ADMIN — SODIUM CHLORIDE 300 MILLILITER(S): 3 INJECTION, SOLUTION INTRAVENOUS at 14:19

## 2025-05-02 RX ADMIN — Medication 1 SPRAY(S): at 06:41

## 2025-05-02 RX ADMIN — Medication 81 MILLIGRAM(S): at 13:06

## 2025-05-02 RX ADMIN — ISOSORBIDE MONONITRATE 60 MILLIGRAM(S): 60 TABLET, EXTENDED RELEASE ORAL at 12:56

## 2025-05-02 RX ADMIN — SACUBITRIL AND VALSARTAN 1 TABLET(S): 6; 6 PELLET ORAL at 17:31

## 2025-05-02 RX ADMIN — METHOCARBAMOL 500 MILLIGRAM(S): 500 TABLET, FILM COATED ORAL at 17:31

## 2025-05-02 RX ADMIN — Medication 1300 MILLIGRAM(S): at 06:36

## 2025-05-02 RX ADMIN — Medication 975 MILLIGRAM(S): at 23:53

## 2025-05-02 RX ADMIN — Medication 975 MILLIGRAM(S): at 22:16

## 2025-05-02 RX ADMIN — Medication 1 SPRAY(S): at 17:32

## 2025-05-02 RX ADMIN — Medication 40 MILLIGRAM(S): at 06:40

## 2025-05-02 RX ADMIN — BUMETANIDE 5 MG/HR: 1 TABLET ORAL at 06:37

## 2025-05-02 RX ADMIN — Medication 1300 MILLIGRAM(S): at 12:55

## 2025-05-02 RX ADMIN — INSULIN LISPRO 4: 100 INJECTION, SOLUTION INTRAVENOUS; SUBCUTANEOUS at 17:32

## 2025-05-02 RX ADMIN — GABAPENTIN 400 MILLIGRAM(S): 400 CAPSULE ORAL at 12:55

## 2025-05-02 RX ADMIN — Medication 25 MILLIGRAM(S): at 12:55

## 2025-05-02 RX ADMIN — CARVEDILOL 6.25 MILLIGRAM(S): 3.12 TABLET, FILM COATED ORAL at 17:32

## 2025-05-02 RX ADMIN — SACUBITRIL AND VALSARTAN 1 TABLET(S): 6; 6 PELLET ORAL at 06:36

## 2025-05-02 RX ADMIN — LIDOCAINE HYDROCHLORIDE 1 PATCH: 20 JELLY TOPICAL at 13:10

## 2025-05-02 RX ADMIN — Medication 1300 MILLIGRAM(S): at 22:15

## 2025-05-02 RX ADMIN — Medication 975 MILLIGRAM(S): at 14:51

## 2025-05-02 RX ADMIN — TICAGRELOR 90 MILLIGRAM(S): 90 TABLET ORAL at 17:32

## 2025-05-02 RX ADMIN — INSULIN GLARGINE-YFGN 5 UNIT(S): 100 INJECTION, SOLUTION SUBCUTANEOUS at 22:15

## 2025-05-02 RX ADMIN — METHOCARBAMOL 500 MILLIGRAM(S): 500 TABLET, FILM COATED ORAL at 06:36

## 2025-05-02 RX ADMIN — Medication 3 MILLIGRAM(S): at 22:17

## 2025-05-02 RX ADMIN — Medication 975 MILLIGRAM(S): at 06:35

## 2025-05-02 NOTE — PROGRESS NOTE ADULT - SUBJECTIVE AND OBJECTIVE BOX
ADVANCED HEART FAILURE - PROGRESS NOTE  [address] [place] [address]  Office Phone: [phone]/Fax: [phone]  Service/On Call Phone [phone]  _______________________________________________________________________________________________________    Subjective:  - NAEO  - Patient states she feels well and denies any SOB, orthopnea, PND, CP, palpitations, LH/dizziness. She was able to ambulate in her room with the walker without any issues.    Medications:  acetaminophen     Tablet .. 975 milliGRAM(s) Oral every 8 hours  aluminum hydroxide/magnesium hydroxide/simethicone Suspension 30 milliLiter(s) Oral every 4 hours PRN  aspirin  chewable 81 milliGRAM(s) Oral daily  atorvastatin 80 milliGRAM(s) Oral at bedtime  benzocaine/menthol Lozenge 1 Lozenge Oral every 2 hours PRN  buMETAnide 4 milliGRAM(s) Oral once  carvedilol 6.25 milliGRAM(s) Oral every 12 hours  dextrose 5%. 1000 milliLiter(s) IV Continuous <Continuous>  dextrose 5%. 1000 milliLiter(s) IV Continuous <Continuous>  dextrose 50% Injectable 25 Gram(s) IV Push once  dextrose 50% Injectable 12.5 Gram(s) IV Push once  dextrose 50% Injectable 25 Gram(s) IV Push once  dextrose Oral Gel 15 Gram(s) Oral once PRN  epoetin kristina-epbx (RETACRIT) Injectable 22804 Unit(s) SubCutaneous once  gabapentin 400 milliGRAM(s) Oral daily  glucagon  Injectable 1 milliGRAM(s) IntraMuscular once  hydrALAZINE 25 milliGRAM(s) Oral every 8 hours  insulin glargine Injectable (LANTUS) 5 Unit(s) SubCutaneous at bedtime  insulin lispro (ADMELOG) corrective regimen sliding scale   SubCutaneous three times a day before meals  isosorbide   mononitrate ER Tablet (IMDUR) 60 milliGRAM(s) Oral daily  lidocaine   4% Patch 1 Patch Transdermal every 24 hours  loperamide 2 milliGRAM(s) Oral two times a day PRN  melatonin 3 milliGRAM(s) Oral at bedtime  methocarbamol 500 milliGRAM(s) Oral two times a day  metolazone 2.5 milliGRAM(s) Oral once  ondansetron Injectable 4 milliGRAM(s) IV Push every 6 hours PRN  pantoprazole    Tablet 40 milliGRAM(s) Oral before breakfast  sacubitril 97 mG/valsartan 103 mG 1 Tablet(s) Oral two times a day  sodium bicarbonate 1300 milliGRAM(s) Oral three times a day  sodium chloride 0.65% Nasal 1 Spray(s) Both Nostrils two times a day  sodium chloride 2% . 150 milliLiter(s) IV Continuous <Continuous>  ticagrelor 90 milliGRAM(s) Oral every 12 hours      ICU Vital Signs Last 24 Hrs  T(C): 37.1, Max: 37.1 ( @ 07:15)  HR: 88 (77 - 93)  BP: 148/72 (93/54 - 156/84)  BP(mean): 97 (97 - 97)  ABP: --  ABP(mean): --  RR: 18 (18 - 18)  SpO2: 100% (94% - 100%)    Weight in k.7 (25)  Weight in k (25)      I&O's Summary Last 24 Hrs    IN: 705 mL / OUT: 2450 mL / NET: -1745 mL      Tele: Paced 80s.    Physical Exam:    General: No distress. Comfortable.  Neck: JVP elevated.  Respiratory: Clear to auscultation bilaterally  CV: RRR. Normal S1 and S2. No murmurs, rub, or gallops. Radial pulses normal.  Abdomen: Soft, non-distended, non-tender  Extremities: Warm, no edema  Neurology: Non-focal, alert and oriented times three.   Psych: Affect normal    Labs:      ( 25 @ 05:00 )     130  |  89  |  51.3  ---------------------<  169  4.2  |  25.0  |  2.50    Ca 8.3  Phos x   Mg x     ( 25 @ 16:15 )  TropHS 118   / CK x     / CKMB x      ( 25 @ 10:04 )  TropHS 140   / CK x     / CKMB x

## 2025-05-02 NOTE — PROGRESS NOTE ADULT - PROBLEM SELECTOR PLAN 3
- Baseline Cr ~1.6-1.7?  - Renal function had been improving with diuresis but increased again today likely due to hypervolemia given rise in CardioMEMS PAD.  - C/w diuresis as above.  - Avoid nephrotoxics  - Nephrology following.

## 2025-05-02 NOTE — PROGRESS NOTE ADULT - TIME BILLING
Time spent reviewing the chart documentation, reviewing labs and imaging studies, evaluating the patient, discussing the plan of care with the consultants & medical team, and documenting.
reviewing the chart documentation, reviewing labs and imaging studies, evaluating the patient, discussing the plan of care with the consultants & medical team, and documenting.
Chart, labs, orders, vitals, and imaging reviewed and plan of care discussed with consultants and IDR team in detail. Plan of care discussed with patient at bedside.
reviewing the chart documentation, reviewing labs and imaging studies, evaluating the patient, discussing the plan of care with the consultants & medical team, and documenting.
Time spent reviewing the chart documentation, reviewing labs and imaging studies, evaluating the patient, discussing the plan of care with the consultants & medical team, and documenting.
Time spent reviewing the chart documentation, reviewing labs and imaging studies, evaluating the patient, discussing the plan of care with the consultants & medical team, and documenting.
reviewing the chart documentation, reviewing labs and imaging studies, evaluating the patient, discussing the plan of care with the consultants & medical team, and documenting.
Chart, labs, orders, vitals, and imaging reviewed and plan of care discussed with consultants and IDR team in detail. Plan of care discussed with patient at bedside.
Time spent reviewing the chart documentation, reviewing labs and imaging studies, evaluating the patient, discussing the plan of care with the consultants & medical team, and documenting.
reviewing the chart documentation, reviewing labs and imaging studies, evaluating the patient, discussing the plan of care with the consultants & medical team, and documenting.
Chart, labs, orders, vitals, and imaging reviewed and plan of care discussed with consultants and IDR team in detail. Plan of care discussed with patient at bedside.
Time spent reviewing the chart documentation, reviewing labs and imaging studies, evaluating the patient, discussing the plan of care with the consultants & medical team, and documenting.

## 2025-05-02 NOTE — PROGRESS NOTE ADULT - ASSESSMENT
74yoF with a history of coronary artery disease, heart failure, chronic kidney disease, hypertension, diabetes, and anemia who was recently hospitalized for NSTEMI with PCI and bacteremia who presented with dyspnea on exertion and weight gain. Continues on Bumex drip as per cardiology additional diuretics with metolazone / Bumex IV given 4/21. s/p repeat heart cath 4/22 with no need for MEMS recalibration however plan for upgarde to CRT-P vs CRT-D device 4/24, s/p Cardiac MR. Diuresis resumed by HF team 4/25, continue to monitor.     #Acute on chronic systolic heart failure  - Recent echocardiogram noted ejection fraction of 31% with multiple segmental abnormalities  - On metoprolol, Imdur 60 mg daily, spironolactone, Entresto full dose, hydralazine changed to 25mg tid due to hypotension  - Heart Failure on board recs appreciated, s/p PPM device upgrade 4/24 without issue, functioning well now adjusting diuresis, check cardiomems qd   -Was on Bumex ggt. With rising Cr, Changed to Bumex 4mg oral bid with Metolazone per HF today due to unresponsive to bumex ggt and high cardiomems  -Per HF: Can continue to give hypertonic saline daily if ongoing hyponatremia and increase Bumex/metolazone if needed.  - Given patients recurrent UTI's / history of bacteremia, EP requesting ID input for potential abx's prior to device upgrade / implant, consulted, recs appreciated, no rec for prophylactic abx/ Pt on Keflex s/p CRT-D for 5 days   - s/p repeat Heart cath 4/22, no recalibration of Mems needed     #Chronic kidney disease  - Continue to monitor while on diuresis  - Nephro on board, will f/u recs    #Right knee pain  - knee xray showing effusion, possible in setting of fall  - c/w current pain regimen, adjust as needed   - CT knee result noted  - ortho consulted, sp aspiration fluid neg for crystals/infections  - ROM improving     #Fever  #UTI  resolved   s/p Abxs with Zosyn    #Diarrhea  resolved   - possibly abx related  - GI pcr neg, PRN imodium started    #Coronary artery disease  - Continue on aspirin, ticagrelor, and atorvastatin  - Recent cardiac catheterization with PCI, Brillinta dosage increased to 90 BID from 60 as per HF  - initial trop 140 -> 118  - c/w maalox, pain improves with GI cocktail    #Hyponatremia improving   - Hypervolemic on presentation  - s/p Hypertonic saline 2% (150ml over 30mins) for hyponatremia     #Diabetes  - Insulin coverage  - Gabapentin for neuropathy  - need Glucometer on discharge     DVT ppx: ASA/Brilinta     Dispo: Medically acute with rising Cr and high Cardiomems readings for now.

## 2025-05-02 NOTE — PROGRESS NOTE ADULT - ASSESSMENT
73 y/o F w h/o ICM/HFrEF (LVEF 35-40%), CAD s/p PCI, Mobitz II s/p PPM (MD NereydaT), HTN, HLD, PAD with chronic right LE wound, DM2, and CKD3, who was BIBA from CHF clinic due to hypervolemia, weight gain of ~20 lbs in the past 9 days    73 yo female (known to our service) + CKD(III) + ICM (EF 25-30%) admitted with decompensated CHF  Pt clinically improved on current Rx  - pt to continue po Bumex  as per cardiology and accept azotemia  - cont Entresto as per cardiology  - no current need for RRT but may need it in future    Monitor labs will follow

## 2025-05-02 NOTE — CHART NOTE - NSCHARTNOTEFT_GEN_A_CORE
Source: Patient, family, chart    Current Diet: Diet, DASH/TLC:   Sodium & Cholesterol Restricted  1000mL Fluid Restriction ([id number])     Special Instructions for Nursing:  Sodium & Cholesterol Restricted (04-25-25 @ 13:55)    PO intake:  50-75%     Source for PO intake: Patient, family, chart     Current Weight:   4/11 158 lbs  4/15 153.8 lbs  4/25 150.1 lbs  4/28 147.2 lbs  5/1 143.4 lbs    Lefty/right ankle 1+ edema     Pertinent Medications: MEDICATIONS  (STANDING):  buMETAnide Infusion 1 mG/Hr (5 mL/Hr) IV Continuous <Continuous>  carvedilol 6.25 milliGRAM(s) Oral every 12 hours  dextrose 5%. 1000 milliLiter(s) (50 mL/Hr) IV Continuous <Continuous>  dextrose 50% Injectable 25 Gram(s) IV Push once  epoetin kristina-epbx (RETACRIT) Injectable 91032 Unit(s) SubCutaneous once  glucagon  Injectable 1 milliGRAM(s) IntraMuscular once  hydrALAZINE 25 milliGRAM(s) Oral every 8 hours  insulin glargine Injectable (LANTUS) 5 Unit(s) SubCutaneous at bedtime  insulin lispro (ADMELOG) corrective regimen sliding scale   SubCutaneous three times a day before meals  isosorbide   mononitrate ER Tablet (IMDUR) 60 milliGRAM(s) Oral daily  pantoprazole    Tablet 40 milliGRAM(s) Oral before breakfast  sacubitril 97 mG/valsartan 103 mG 1 Tablet(s) Oral two times a day  sodium bicarbonate 1300 milliGRAM(s) Oral three times a day  sodium chloride 0.65% Nasal 1 Spray(s) Both Nostrils two times a day  ticagrelor 90 milliGRAM(s) Oral every 12 hours    MEDICATIONS  (PRN):  dextrose Oral Gel 15 Gram(s) Oral once PRN Blood Glucose LESS THAN 70 milliGRAM(s)/deciliter  loperamide 2 milliGRAM(s) Oral two times a day PRN Diarrhea  ondansetron Injectable 4 milliGRAM(s) IV Push every 6 hours PRN Nausea and/or Vomiting    Pertinent Labs: 05-02 Na130 mmol/L[L] Glu 169 mg/dL[H] K+ 4.2 mmol/L Cr  2.50 mg/dL[H] BUN 51.3 mg/dL[H]     Nutrition focused physical exam conducted previously with signs of malnutrition absent    Estimated Needs:   9975-3669 kcal (30-35 kcal/kg 45.3kg)  54-63g protein (1.2-1.4g/kg 45.3kg)    Hospital Course: Per chart "74yoF with a history of coronary artery disease, heart failure, chronic kidney disease, hypertension, diabetes, and anemia who was recently hospitalized for NSTEMI with PCI and bacteremia who presented with dyspnea on exertion and weight gain. Continues on Bumex drip as per cardiology additional diuretics with metolazone / Bumex IV given 4/21. s/p repeat heart cath 4/22 with no need for MEMS recalibration however plan for upgarde to CRT-P vs CRT-D device 4/24, s/p Cardiac MR. Diuresis resumed by HF team 4/25, continue to monitor."    Current Nutrition Diagnosis: Increased Nutrient Needs (protein) related to increased physiological demand for healing as evidenced by heart failure.    Patient Mauritian speaking  services used (ID# [id number]). Son arrived during interview and pt prefers family to translate. Pt tolerating current diet well with reported improving appetite/PO intake. Family brings in food from home to encourage intake. Pt ate dinner very late last night, so was not hungry for breakfast yet this AM. Pt with no c/o N/V/C/D at this time, last BM 5/1 per pt. No further questions at this time. Will continue to monitor and follow up as needed. RD remains available.      Recommendations:   1) Add consistent carbohydrate diet restriction (pt with DM and elevated blood sugars).   2) Encourage po intake, monitor diet tolerance, and provide assistance at meals as needed.   3) Rx: MVI daily.   4) Monitor BG levels, correct prn   5) Obtain daily weights to monitor trends.     Monitoring and Evaluation:   [x] PO intake [x] Tolerance to diet prescription [X] Weights  [X] Follow up per protocol [X] Labs: chem 8, POCT glucose

## 2025-05-02 NOTE — PROGRESS NOTE ADULT - PROBLEM SELECTOR PLAN 1
- ICM, most recent TTE on 4/21/25 showed further decline of LVEF to 20-25%  - Clinically appears hypervolemic on exam w/warm extremities.  - Serum pro-BNP > 70k on 4/21. Repeat pro-BNP 43k on 4/28.  - Hyponatremia had been improving with diuresis but dropped again today.  - Her CardioMEMS PAD increased to 27-28 from 24.  - Diuretics: Will stop continuous Bumex infusion which she does not seem to be responding to and switch to high intermittent dosing of Bumex with 4 mg PO BID along with metolazone 2.5 mg daily. Will also give her another dose of hypertonic saline 2% (150 mL over 30 minutes) given hyponatremia.  - Check CardioMEMS daily. Can continue to give hypertonic saline daily if ongoing hyponatremia and increase Bumex/metolazone if needed.  - GDMT: Continue Coreg 6.25 mg BID, Entresto  mg BID, and hydralazine 25 mg TID. Aldactone stopped for hyperkalemia. When euvolemic, will wean hydralazine and increase uptitrate Coreg. Continue Imdur 60 mg daily for anginal pain. No Farxiga 2/2 h/o pyelonephritis.   - Device: h/o Micra PPM with chronic . She is now s/p CRT-D on 4/24.  - Low Na, 1.5L FR diet recommended  - Please document strict I&Os and daily standing weight.  - Needs daily PT. Very deconditioned.

## 2025-05-02 NOTE — PROGRESS NOTE ADULT - SUBJECTIVE AND OBJECTIVE BOX
NEPHROLOGY INTERVAL HPI/OVERNIGHT EVENTS:    Examined earlier  No distress  denies HA CP no SOB    MEDICATIONS  (STANDING):  acetaminophen     Tablet .. 975 milliGRAM(s) Oral every 8 hours  aspirin  chewable 81 milliGRAM(s) Oral daily  atorvastatin 80 milliGRAM(s) Oral at bedtime  carvedilol 6.25 milliGRAM(s) Oral every 12 hours  dextrose 5%. 1000 milliLiter(s) (100 mL/Hr) IV Continuous <Continuous>  dextrose 5%. 1000 milliLiter(s) (50 mL/Hr) IV Continuous <Continuous>  dextrose 50% Injectable 25 Gram(s) IV Push once  dextrose 50% Injectable 12.5 Gram(s) IV Push once  dextrose 50% Injectable 25 Gram(s) IV Push once  epoetin kristina-epbx (RETACRIT) Injectable 30629 Unit(s) SubCutaneous once  gabapentin 400 milliGRAM(s) Oral daily  glucagon  Injectable 1 milliGRAM(s) IntraMuscular once  hydrALAZINE 25 milliGRAM(s) Oral every 8 hours  insulin glargine Injectable (LANTUS) 5 Unit(s) SubCutaneous at bedtime  insulin lispro (ADMELOG) corrective regimen sliding scale   SubCutaneous three times a day before meals  isosorbide   mononitrate ER Tablet (IMDUR) 60 milliGRAM(s) Oral daily  lidocaine   4% Patch 1 Patch Transdermal every 24 hours  melatonin 3 milliGRAM(s) Oral at bedtime  methocarbamol 500 milliGRAM(s) Oral two times a day  pantoprazole    Tablet 40 milliGRAM(s) Oral before breakfast  sacubitril 97 mG/valsartan 103 mG 1 Tablet(s) Oral two times a day  sodium bicarbonate 1300 milliGRAM(s) Oral three times a day  sodium chloride 0.65% Nasal 1 Spray(s) Both Nostrils two times a day  ticagrelor 90 milliGRAM(s) Oral every 12 hours    MEDICATIONS  (PRN):  aluminum hydroxide/magnesium hydroxide/simethicone Suspension 30 milliLiter(s) Oral every 4 hours PRN Dyspepsia  benzocaine/menthol Lozenge 1 Lozenge Oral every 2 hours PRN Sore Throat  dextrose Oral Gel 15 Gram(s) Oral once PRN Blood Glucose LESS THAN 70 milliGRAM(s)/deciliter  loperamide 2 milliGRAM(s) Oral two times a day PRN Diarrhea  ondansetron Injectable 4 milliGRAM(s) IV Push every 6 hours PRN Nausea and/or Vomiting      Allergies    No Known Allergies    Intolerances        Vital Signs Last 24 Hrs  T(C): 37.1 (02 May 2025 07:15), Max: 37.1 (02 May 2025 07:15)  T(F): 98.7 (02 May 2025 07:15), Max: 98.7 (02 May 2025 07:15)  HR: 88 (02 May 2025 12:45) (77 - 93)  BP: 148/72 (02 May 2025 12:45) (93/54 - 156/84)  BP(mean): 97 (02 May 2025 12:45) (97 - 97)  RR: 18 (02 May 2025 12:45) (18 - 18)  SpO2: 100% (02 May 2025 12:45) (94% - 100%)    Parameters below as of 02 May 2025 12:45  Patient On (Oxygen Delivery Method): room air      Daily     Daily Weight in k.7 (02 May 2025 07:15)    PHYSICAL EXAM:  GENERAL: NAD frail  HEENT: No periorbital edema  ENMT:  Moist mucous membranes  NECK: Supple, No JVD  NERVOUS SYSTEM:  Alert & Oriented X  CHEST/LUNG: Diminished BS noted  HEART: Regular rate and rhythm; No rub  ABDOMEN: Soft, Nontender, +BS  EXTREMITIES: + dependent edema less    LABS:        130[L]  |  89[L]  |  51.3[H]  ----------------------------<  169[H]  4.2   |  25.0  |  2.50[H]    Ca    8.3[L]      02 May 2025 05:00  Mg     2.0     05-01        Urinalysis Basic - ( 02 May 2025 05:00 )    Color: x / Appearance: x / SG: x / pH: x  Gluc: 169 mg/dL / Ketone: x  / Bili: x / Urobili: x   Blood: x / Protein: x / Nitrite: x   Leuk Esterase: x / RBC: x / WBC x   Sq Epi: x / Non Sq Epi: x / Bacteria: x              RADIOLOGY & ADDITIONAL TESTS:

## 2025-05-02 NOTE — PROGRESS NOTE ADULT - ASSESSMENT
Gen Cards: Dr. Allen  HF: Dr. Polanco  IC: Dr. Bajwa    73 y/o female with PMH of ICM/HFrEF (LVEF 35-40%), CAD s/p PCI, Mobitz II s/p PPM (Nereyda, MDT), HTN, HLD, PAD with chronic right LE wound, DM2, and CKD3, who was BIBA from CHF clinic due to hypervolemia, weight gain of ~20 lbs in the past 9 days despite escalation of PO diuretics.      She was recently admitted on 3/19/25 with c/o CP and found to have NSTEMI. She underwent LHC on 3/21 which showed mid-distal LAD severe stenosis, OM1 and OM2 severe stenosis, mid ISR 50%, distal ISR 99%, s/p PCI with 1 ZEINAB to RCA.  On 3/22 she had a fever with leukocytosis and her blood cultures were positive for E. Coli/ESBL. Her CT A/P showed left pyelonephritis. She also had an BRADY for which her Entresto was held. She was discharged home 3/31 and her CardioMEMS was not at goal and had been rising. She was advised to increase Torsemide 40 mg daily to twice daily on 4/4. Despite this, she has continued to gain weight and reports poor UOP response.     Admission labwork: Na 125, K 4.6, BUN 37.7, Cr 1.94, eGFR 27, Mg 2.3, proBNP 97873!  CXR: increased interstitial markings bilaterally, cardiomegaly.   On 4/10 she developed a fever, tmax 100.4F. RVP negative, empirically started on IV Zosyn.  4/13 s/p right knee fluid aspiration (s/p recent trauma/fall).  4/15 initiated on continuous Bumex infusion for inadequate response to intermittent dosing.  4/21: Received 1 dose of metolazone 5 mg and Bumex gtt switched to 4 mg IV BID.  4/24: s/p CRT-D    Prior Cardiac Testing:  RHC 4/22/25: RA 18, RV 63/18, PA 63/25/37, PCW 22, PA sat 37%, Jose CO/CI 2.5/1.5, Thermo CO 2.5, SVR 2800 dsc, PVR 4.5 WILLARD. No CardioMEMS recalibration needed.     TTE 4/21/25: LVEF 20-25%, LVIDd 4.3 cm, LVOT VTI 12.6 cm, grade 2 DD, mild RVE with mild RV dysfunction, mild-mod AS, mod MR/TR, mild MS, est PASP 57 mmHg, est RAP 8 mmHg.    TTE 3/21/25: LVEF 25-30%, grade II DD, normal RV size/function, severe LAE, moderate MR, mild TR, PASP 58 mmHg ( RAP 8 mmHg), no evidence of LV thrombus, multiple segmental abnormalities/regional WMA.    CardioMEMS PAD (goal 14):  5/2: 27-28  5/1: 24 4/30: 26 4/29: 24  4/28: 26 4/25: 24 4/22: 29-30  4/20: 31  4/18: 27  4/17: 27 (in chair ~30 degrees).  4/14: 30-33  4/11: 32  4/9: 32  3/27: 26  3/24: 23  3/21: 22

## 2025-05-02 NOTE — PROGRESS NOTE ADULT - SUBJECTIVE AND OBJECTIVE BOX
Mariela eNlson MD (Available on [employer])  [facility] Division of Hospital Medicine    Chief Complaint:  Sob    SUBJECTIVE / OVERNIGHT EVENTS:  Pt seen and examined at bedside. Denies any complaints.     Patient denies chest pain, SOB, abd pain, N/V, fever, chills, dysuria or any other complaints. All remainder ROS negative.     INTERVAL EVENTS:  -Pt hemodynamically stable with no overnight events.   -Cr uptrended 2.5<<2.1  -HF on board: Plan to switch Bumex ggt to Bumex 4mg oral bid with metolazone 2.5mg qd. Dose of Hypertonic saline for hyponatremia. Cardiomems high again     MEDICATIONS  (STANDING):  acetaminophen     Tablet .. 975 milliGRAM(s) Oral every 8 hours  aspirin  chewable 81 milliGRAM(s) Oral daily  atorvastatin 80 milliGRAM(s) Oral at bedtime  carvedilol 6.25 milliGRAM(s) Oral every 12 hours  dextrose 5%. 1000 milliLiter(s) (50 mL/Hr) IV Continuous <Continuous>  dextrose 5%. 1000 milliLiter(s) (100 mL/Hr) IV Continuous <Continuous>  dextrose 50% Injectable 25 Gram(s) IV Push once  dextrose 50% Injectable 12.5 Gram(s) IV Push once  dextrose 50% Injectable 25 Gram(s) IV Push once  epoetin kristina-epbx (RETACRIT) Injectable 39232 Unit(s) SubCutaneous once  gabapentin 400 milliGRAM(s) Oral daily  glucagon  Injectable 1 milliGRAM(s) IntraMuscular once  hydrALAZINE 25 milliGRAM(s) Oral every 8 hours  insulin glargine Injectable (LANTUS) 5 Unit(s) SubCutaneous at bedtime  insulin lispro (ADMELOG) corrective regimen sliding scale   SubCutaneous three times a day before meals  isosorbide   mononitrate ER Tablet (IMDUR) 60 milliGRAM(s) Oral daily  lidocaine   4% Patch 1 Patch Transdermal every 24 hours  melatonin 3 milliGRAM(s) Oral at bedtime  methocarbamol 500 milliGRAM(s) Oral two times a day  pantoprazole    Tablet 40 milliGRAM(s) Oral before breakfast  sacubitril 97 mG/valsartan 103 mG 1 Tablet(s) Oral two times a day  sodium bicarbonate 1300 milliGRAM(s) Oral three times a day  sodium chloride 0.65% Nasal 1 Spray(s) Both Nostrils two times a day  ticagrelor 90 milliGRAM(s) Oral every 12 hours    MEDICATIONS  (PRN):  aluminum hydroxide/magnesium hydroxide/simethicone Suspension 30 milliLiter(s) Oral every 4 hours PRN Dyspepsia  benzocaine/menthol Lozenge 1 Lozenge Oral every 2 hours PRN Sore Throat  dextrose Oral Gel 15 Gram(s) Oral once PRN Blood Glucose LESS THAN 70 milliGRAM(s)/deciliter  loperamide 2 milliGRAM(s) Oral two times a day PRN Diarrhea  ondansetron Injectable 4 milliGRAM(s) IV Push every 6 hours PRN Nausea and/or Vomiting      I&O's Summary    01 May 2025 07:01  -  02 May 2025 07:00  --------------------------------------------------------  IN: 705 mL / OUT: 2450 mL / NET: -1745 mL    02 May 2025 07:01  -  02 May 2025 16:13  --------------------------------------------------------  IN: 0 mL / OUT: 600 mL / NET: -600 mL        PHYSICAL EXAM:  Vital Signs Last 24 Hrs  T(C): 37.1 (02 May 2025 07:15), Max: 37.1 (02 May 2025 07:15)  T(F): 98.7 (02 May 2025 07:15), Max: 98.7 (02 May 2025 07:15)  HR: 88 (02 May 2025 12:45) (77 - 93)  BP: 148/72 (02 May 2025 12:45) (93/54 - 156/84)  BP(mean): 97 (02 May 2025 12:45) (97 - 97)  RR: 18 (02 May 2025 12:45) (18 - 18)  SpO2: 100% (02 May 2025 12:45) (94% - 100%)    Parameters below as of 02 May 2025 12:45  Patient On (Oxygen Delivery Method): room air        Pt lying in bed in no acute distress  Moist Mucus membranes  S1/S2 regular, no murmurs  CTABL, good air entry  Abdomen soft, non-tender, BS+  No LE edema       LABS:    05-02    130[L]  |  89[L]  |  51.3[H]  ----------------------------<  169[H]  4.2   |  25.0  |  2.50[H]    Ca    8.3[L]      02 May 2025 05:00  Mg     2.0     05-01            Urinalysis Basic - ( 02 May 2025 05:00 )    Color: x / Appearance: x / SG: x / pH: x  Gluc: 169 mg/dL / Ketone: x  / Bili: x / Urobili: x   Blood: x / Protein: x / Nitrite: x   Leuk Esterase: x / RBC: x / WBC x   Sq Epi: x / Non Sq Epi: x / Bacteria: x        CAPILLARY BLOOD GLUCOSE      POCT Blood Glucose.: 225 mg/dL (02 May 2025 12:25)  POCT Blood Glucose.: 168 mg/dL (02 May 2025 07:57)  POCT Blood Glucose.: 201 mg/dL (01 May 2025 21:15)  POCT Blood Glucose.: 161 mg/dL (01 May 2025 17:02)        RADIOLOGY & ADDITIONAL TESTS:  Results Reviewed:   Imaging Personally Reviewed:  Electrocardiogram Personally Reviewed:

## 2025-05-02 NOTE — PROGRESS NOTE ADULT - SUPERVISING ATTENDING
Nova Mack MD
Raeann Gordon MD
Nova Mack MD
Dr. Gordon
Dr. Santos Mendieta
Gayatri
Nova Mack MD
Nova Mack MD
Dr. Mendieta

## 2025-05-03 LAB
ANION GAP SERPL CALC-SCNC: 16 MMOL/L — SIGNIFICANT CHANGE UP (ref 5–17)
APPEARANCE UR: CLEAR — SIGNIFICANT CHANGE UP
BACTERIA # UR AUTO: ABNORMAL /HPF
BILIRUB UR-MCNC: NEGATIVE — SIGNIFICANT CHANGE UP
BUN SERPL-MCNC: 61.8 MG/DL — HIGH (ref 8–20)
CALCIUM SERPL-MCNC: 8 MG/DL — LOW (ref 8.4–10.5)
CAST: 2 /LPF — SIGNIFICANT CHANGE UP (ref 0–4)
CHLORIDE SERPL-SCNC: 88 MMOL/L — LOW (ref 96–108)
CO2 SERPL-SCNC: 27 MMOL/L — SIGNIFICANT CHANGE UP (ref 22–29)
COLOR SPEC: YELLOW — SIGNIFICANT CHANGE UP
CREAT SERPL-MCNC: 2.68 MG/DL — HIGH (ref 0.5–1.3)
DIFF PNL FLD: NEGATIVE — SIGNIFICANT CHANGE UP
EGFR: 18 ML/MIN/1.73M2 — LOW
EGFR: 18 ML/MIN/1.73M2 — LOW
GLUCOSE BLDC GLUCOMTR-MCNC: 190 MG/DL — HIGH (ref 70–99)
GLUCOSE BLDC GLUCOMTR-MCNC: 198 MG/DL — HIGH (ref 70–99)
GLUCOSE BLDC GLUCOMTR-MCNC: 211 MG/DL — HIGH (ref 70–99)
GLUCOSE BLDC GLUCOMTR-MCNC: 282 MG/DL — HIGH (ref 70–99)
GLUCOSE SERPL-MCNC: 197 MG/DL — HIGH (ref 70–99)
GLUCOSE UR QL: NEGATIVE MG/DL — SIGNIFICANT CHANGE UP
KETONES UR-MCNC: NEGATIVE MG/DL — SIGNIFICANT CHANGE UP
LEUKOCYTE ESTERASE UR-ACNC: ABNORMAL
MAGNESIUM SERPL-MCNC: 1.9 MG/DL — SIGNIFICANT CHANGE UP (ref 1.6–2.6)
NITRITE UR-MCNC: NEGATIVE — SIGNIFICANT CHANGE UP
PH UR: 8 — SIGNIFICANT CHANGE UP (ref 5–8)
POTASSIUM SERPL-MCNC: 3.4 MMOL/L — LOW (ref 3.5–5.3)
POTASSIUM SERPL-SCNC: 3.4 MMOL/L — LOW (ref 3.5–5.3)
PROT UR-MCNC: SIGNIFICANT CHANGE UP MG/DL
RBC CASTS # UR COMP ASSIST: 1 /HPF — SIGNIFICANT CHANGE UP (ref 0–4)
SODIUM SERPL-SCNC: 131 MMOL/L — LOW (ref 135–145)
SP GR SPEC: 1.01 — SIGNIFICANT CHANGE UP (ref 1–1.03)
SQUAMOUS # UR AUTO: 1 /HPF — SIGNIFICANT CHANGE UP (ref 0–5)
UROBILINOGEN FLD QL: 1 MG/DL — SIGNIFICANT CHANGE UP (ref 0.2–1)
WBC UR QL: 42 /HPF — HIGH (ref 0–5)

## 2025-05-03 PROCEDURE — 99233 SBSQ HOSP IP/OBS HIGH 50: CPT

## 2025-05-03 PROCEDURE — 71045 X-RAY EXAM CHEST 1 VIEW: CPT | Mod: 26

## 2025-05-03 RX ORDER — BUMETANIDE 1 MG/1
0.5 TABLET ORAL
Qty: 20 | Refills: 0 | Status: DISCONTINUED | OUTPATIENT
Start: 2025-05-03 | End: 2025-05-04

## 2025-05-03 RX ORDER — BUMETANIDE 1 MG/1
2 TABLET ORAL ONCE
Refills: 0 | Status: COMPLETED | OUTPATIENT
Start: 2025-05-03 | End: 2025-05-03

## 2025-05-03 RX ORDER — BUMETANIDE 1 MG/1
2 TABLET ORAL ONCE
Refills: 0 | Status: DISCONTINUED | OUTPATIENT
Start: 2025-05-03 | End: 2025-05-03

## 2025-05-03 RX ORDER — DOBUTAMINE 250 MG/20ML
2.5 INJECTION INTRAVENOUS
Qty: 500 | Refills: 0 | Status: DISCONTINUED | OUTPATIENT
Start: 2025-05-03 | End: 2025-05-06

## 2025-05-03 RX ORDER — ERTAPENEM SODIUM 1 G/1
500 INJECTION, POWDER, LYOPHILIZED, FOR SOLUTION INTRAMUSCULAR; INTRAVENOUS EVERY 24 HOURS
Refills: 0 | Status: COMPLETED | OUTPATIENT
Start: 2025-05-03 | End: 2025-05-07

## 2025-05-03 RX ADMIN — BUMETANIDE 2 MILLIGRAM(S): 1 TABLET ORAL at 16:49

## 2025-05-03 RX ADMIN — INSULIN LISPRO 4: 100 INJECTION, SOLUTION INTRAVENOUS; SUBCUTANEOUS at 12:57

## 2025-05-03 RX ADMIN — Medication 1 SPRAY(S): at 06:56

## 2025-05-03 RX ADMIN — BUMETANIDE 2.5 MG/HR: 1 TABLET ORAL at 17:20

## 2025-05-03 RX ADMIN — Medication 975 MILLIGRAM(S): at 14:32

## 2025-05-03 RX ADMIN — METHOCARBAMOL 500 MILLIGRAM(S): 500 TABLET, FILM COATED ORAL at 06:52

## 2025-05-03 RX ADMIN — ERTAPENEM SODIUM 100 MILLIGRAM(S): 1 INJECTION, POWDER, LYOPHILIZED, FOR SOLUTION INTRAMUSCULAR; INTRAVENOUS at 15:25

## 2025-05-03 RX ADMIN — SACUBITRIL AND VALSARTAN 1 TABLET(S): 6; 6 PELLET ORAL at 06:51

## 2025-05-03 RX ADMIN — BUMETANIDE 2.5 MG/HR: 1 TABLET ORAL at 22:37

## 2025-05-03 RX ADMIN — LIDOCAINE HYDROCHLORIDE 1 PATCH: 20 JELLY TOPICAL at 12:26

## 2025-05-03 RX ADMIN — BUMETANIDE 4 MILLIGRAM(S): 1 TABLET ORAL at 07:03

## 2025-05-03 RX ADMIN — TICAGRELOR 90 MILLIGRAM(S): 90 TABLET ORAL at 06:52

## 2025-05-03 RX ADMIN — Medication 1300 MILLIGRAM(S): at 14:32

## 2025-05-03 RX ADMIN — GABAPENTIN 400 MILLIGRAM(S): 400 CAPSULE ORAL at 12:23

## 2025-05-03 RX ADMIN — Medication 975 MILLIGRAM(S): at 06:51

## 2025-05-03 RX ADMIN — CARVEDILOL 6.25 MILLIGRAM(S): 3.12 TABLET, FILM COATED ORAL at 17:35

## 2025-05-03 RX ADMIN — INSULIN LISPRO 2: 100 INJECTION, SOLUTION INTRAVENOUS; SUBCUTANEOUS at 17:27

## 2025-05-03 RX ADMIN — Medication 975 MILLIGRAM(S): at 22:36

## 2025-05-03 RX ADMIN — Medication 40 MILLIEQUIVALENT(S): at 12:23

## 2025-05-03 RX ADMIN — Medication 40 MILLIEQUIVALENT(S): at 14:31

## 2025-05-03 RX ADMIN — DOBUTAMINE 5.48 MICROGRAM(S)/KG/MIN: 250 INJECTION INTRAVENOUS at 16:49

## 2025-05-03 RX ADMIN — DOBUTAMINE 5.48 MICROGRAM(S)/KG/MIN: 250 INJECTION INTRAVENOUS at 22:37

## 2025-05-03 RX ADMIN — Medication 3 MILLIGRAM(S): at 22:36

## 2025-05-03 RX ADMIN — Medication 1300 MILLIGRAM(S): at 22:36

## 2025-05-03 RX ADMIN — INSULIN GLARGINE-YFGN 5 UNIT(S): 100 INJECTION, SOLUTION SUBCUTANEOUS at 22:35

## 2025-05-03 RX ADMIN — ATORVASTATIN CALCIUM 80 MILLIGRAM(S): 80 TABLET, FILM COATED ORAL at 22:36

## 2025-05-03 RX ADMIN — Medication 1300 MILLIGRAM(S): at 06:51

## 2025-05-03 RX ADMIN — TICAGRELOR 90 MILLIGRAM(S): 90 TABLET ORAL at 17:36

## 2025-05-03 RX ADMIN — CARVEDILOL 6.25 MILLIGRAM(S): 3.12 TABLET, FILM COATED ORAL at 06:52

## 2025-05-03 RX ADMIN — INSULIN LISPRO 2: 100 INJECTION, SOLUTION INTRAVENOUS; SUBCUTANEOUS at 08:44

## 2025-05-03 RX ADMIN — METHOCARBAMOL 500 MILLIGRAM(S): 500 TABLET, FILM COATED ORAL at 17:36

## 2025-05-03 RX ADMIN — Medication 25 MILLIGRAM(S): at 14:32

## 2025-05-03 RX ADMIN — Medication 25 MILLIGRAM(S): at 22:36

## 2025-05-03 RX ADMIN — ISOSORBIDE MONONITRATE 60 MILLIGRAM(S): 60 TABLET, EXTENDED RELEASE ORAL at 12:23

## 2025-05-03 RX ADMIN — Medication 25 MILLIGRAM(S): at 06:52

## 2025-05-03 RX ADMIN — Medication 40 MILLIGRAM(S): at 06:56

## 2025-05-03 RX ADMIN — Medication 81 MILLIGRAM(S): at 12:23

## 2025-05-03 NOTE — PROGRESS NOTE ADULT - ASSESSMENT
75 y/o F w h/o ICM/HFrEF (LVEF 35-40%), CAD s/p PCI, Mobitz II s/p PPM (MD NereydaT), HTN, HLD, PAD with chronic right LE wound, DM2, and CKD3, who was BIBA from CHF clinic due to hypervolemia, weight gain of ~20 lbs in the past 9 days    75 yo female (known to our service) + CKD(III) + ICM (EF 25-30%) admitted with decompensated CHF  Pt clinically improved on current Rx  - pt to continue po Bumex / Metolazone as per cardiology and accept azotemia  - cont Entresto as per cardiology  - no current need for RRT but may need it in future    Hyponatremia likely dilutional improving w diuretics    HypoKalemia likely 2/2 diuretics will add daily potassium supplement     Needs strict I & O s  Renally dose meds, avoid nephrotoxic agents  Monitor labs will follow

## 2025-05-03 NOTE — PROGRESS NOTE ADULT - PROBLEM SELECTOR PLAN 1
.  - ICM; most recent TTE 4/21/25: EF 20-25%  - Clinically appears hypervolemic on exam w/warm extremities.  - Serum pro-BNP > 70k on 4/21. Repeat pro-BNP 43k on 4/28.  - Hyponatremia had been improving with diuresis but dropped again today.  - Her CardioMEMS PAD increased to 27-28 from 24.  - Diuretics: Will stop continuous Bumex infusion which she does not seem to be responding to and switch to high intermittent dosing of Bumex with 4 mg PO BID along with metolazone 2.5 mg daily. Will also give her another dose of hypertonic saline 2% (150 mL over 30 minutes) given hyponatremia.  - Check CardioMEMS daily. Can continue to give hypertonic saline daily if ongoing hyponatremia and increase Bumex/metolazone if needed.  - GDMT: Continue Coreg 6.25 mg BID, Entresto  mg BID, and hydralazine 25 mg TID. Aldactone stopped for hyperkalemia. When euvolemic, will wean hydralazine and increase uptitrate Coreg. Continue Imdur 60 mg daily for anginal pain. No Farxiga 2/2 h/o pyelonephritis.   - Device: h/o Micra PPM with chronic . She is now s/p CRT-D on 4/24.  - Low Na, 1.5L FR diet recommended  - Please document strict I&Os and daily standing weight.  - Needs daily PT. Very deconditioned. .  -ICM; most recent TTE 25: EF 20-25%  -s/p CRT-D on   -CardioMEMS PAD increased to 27-28 from 24.  -BUN/Cr uptrending. Per I&O documentation, pt fluid balance net -1L over 24 hr.  -On exam, BLE edema and diminished lung sounds at b/l bases noted.   -This AM, pt denies chest pain, palpitations, shortness of breath, or any other acute cardiac symptoms.     -Will initiate Bumex gtt in combination with  gtt, as pt is demonstrating poor response to current PO regimen. Bumex gtt 0.5mg/hr s/p Bumex 2mg IVP, with Dobutamine gtt 2.5mcg/kg/hour  -c/w metolazone 2.5 mg   -Place schroeder catheter for strict I&Os to monitor response to diuresis   -Repeat cardioMEMS Monday, 48 hours s/p new regimen   -Hold Entresto i/s/o worsening renal function   -c/w Coreg 6.25 mg BID and hydralazine 25 mg TID  -1.5L fluid-restriction recommended for hyponatremia. .  -ICM; most recent TTE 25: EF 20-25%  -s/p CRT-D on   -CardioMEMS PAD increased to 27-28 from 24.  -BUN/Cr uptrending. Per I&O documentation, pt fluid balance net -1L over 24 hr.  -On exam, BLE edema and diminished lung sounds at b/l bases noted.   -This AM, pt denies chest pain, palpitations, shortness of breath, or any other acute cardiac symptoms.     -Will initiate Bumex gtt in combination with  gtt, as pt is demonstrating poor response to current PO regimen. Bumex gtt 0.5mg/hr s/p Bumex 2mg IVP, with Dobutamine gtt 2.5mcg/kg/hour  -Increase metolazone 2.5 mg to BID  -Place schroeder catheter for strict I&Os to monitor response to diuresis   -Repeat cardioMEMS Monday, 48 hours s/p new regimen   -Hold Entresto i/s/o worsening renal function   -Hold Coreg while on  gtt  -c/w Hydralazine 25 mg TID  -1.5L fluid-restriction recommended for hyponatremia.

## 2025-05-03 NOTE — PROGRESS NOTE ADULT - ASSESSMENT
74yoF with a history of coronary artery disease, heart failure, chronic kidney disease, hypertension, diabetes, and anemia who was recently hospitalized for NSTEMI with PCI and bacteremia who presented with dyspnea on exertion and weight gain. Continues on Bumex drip as per cardiology additional diuretics with metolazone / Bumex IV given 4/21. s/p repeat heart cath 4/22 with no need for MEMS recalibration however plan for upgarde to CRT-P vs CRT-D device 4/24, s/p Cardiac MR. Diuresis resumed by HF team 4/25, continue to monitor.     1-Acute on chronic systolic heart failure  - Recent echocardiogram noted ejection fraction of 31% with multiple segmental abnormalities  - On metoprolol, Imdur 60 mg daily, spironolactone, Entresto full dose, hydralazine   still with fluid overload , HF team follow up noted   d/w cardiology today   plan start dobutamine iv infusion   cont iv diuretucs   schroeder to monitor intake output   repeat CXR today   check cardiomems qd   - s/p repeat Heart cath 4/22, no recalibration of Mems needed    2- H/o Recurrent UTI   with urinary symptoms   pfeiffer cx from 4/25 ESBL reviewed sens  will send UA , urine CX from insertion of schroeder today   start invanz post cx send   reasses and d/w ID pending repeat cx result     - Given patients recurrent UTI's / history of bacteremia, EP requesting ID input for potential abx's prior to device upgrade ;  Pt on Keflex s/p CRT-D for 5 days     3-Chronic kidney disease stage 4  - Continue to monitor while on diuresis  - Nephro on board     4-Hypomagnesemia   hypokalemia   K goal over 4   Mg goal over 2   IV mag , Po k ordered     5-Right knee pain  - knee xray showing effusion, possible in setting of fall  - c/w current pain regimen  - CT knee result noted;   - ortho consulted, sp aspiration fluid neg for crystals/infections  - ROM improving     6-Diarrhea  resolved   - possibly abx related  - GI pcr neg    7-Coronary artery disease  - Continue on aspirin, ticagrelor, and atorvastatin  - Recent cardiac catheterization with PCI, Brillinta dosage increased to 90 BID from 60 as per HF  - initial trop 140 -> 118  - c/w maalox, pain improves with GI cocktail    8-Hyponatremia improving   - Hypervolemic on presentation  - s/p Hypertonic saline 2% (150ml over 30mins) for hyponatremia      9-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

## 2025-05-03 NOTE — CHART NOTE - NSCHARTNOTEFT_GEN_A_CORE
Called by RN to assess patient for pain with urination. Upon arrival to room patient resting comfortably awake, alert in NAD.  Patient with complaints of pain with urination, similar to previous episodes, (+) chills, (+) lower abd. pain (+) fever.  No other complaints at this time.  Denies chest pain, SOB,  N/V/D, headache or dizziness.   Patient has had recurrent UTI's previously resistant to zosyn.  Patient examined at bedside:    VS /64 HR 73 RR 16 T100.5 SP02 94% RA  General:  Awake alert in NAD  HEENT: NC/AT, PERRL, Neck supple  Heart:   S1S2 regular, no murmurs appreciated   Lungs:  Clear, no wheeze, rhonchi or rales appreciated  Abd:     soft, ND, (+) BS x 4 (+) suprapubic tenderness to palpation, (+) mild CVAT B/L   Ext:       No edema, cyanosis or calf tenderness    A:  R/O Recurrent UTI  P:  Stat UA C& S, CBC, BMP, Tylenol as ordered      Case discussed with MD Dr. Ellis, she will follow patient, resulting diagnostics and make further treatment recommendations regarding antibiotics.        RN aware to call Pa/Np staff for re-evaluation for any worsening/continued s/s.

## 2025-05-03 NOTE — PROGRESS NOTE ADULT - NS ATTEND AMEND GEN_ALL_CORE FT
Patient seen and examined by me.    Vitals, labs, imaging, cardiac testing reviewed in HER.  Telemetry reviewed    74y old female with PMH of ICM/HFrEF (LVEF 35-40%), CAD s/p PCI, Mobitz II s/p PPM (MD NereydaT), HTN, HLD, PAD with chronic right LE wound, DM2, and CKD3 who presented from CHF clinic with weight gain of ~20 lbs in the past 9 days, despite escalation of PO diuretics.      She was recently admitted on 3/19/25 with c/o CP and was found to have NSTEMI. She underwent LHC on 3/21 s/p PCI with 1 ZEINAB to RCA, which showed mid-distal LAD: severe stenosis, OM1 and OM2: severe stenosis, mid ISR: 50%, distal ISR: 99%. She was discharged home 3/31, and her CardioMEMS was not at goal and rising. Pt reports poor UOP response to home diuretics.   TTE 4/21/25: LVEF 20-25%. Now s/p CRT-D on 4/24. Last cardioMEMS 27-28 on 5/2 (goal 14).    1.  Acute on chronic systolic heart failure–patient still appears to be volume overloaded and not responding well to diuretics.  Creatinine continues to trend up without significant improvement in urine output.  Patient appears to be in the low flow state.  Will start dobutamine drip at 2.5 and Bumex drip at 0.5 mg/h after 2 mg IV push dose.  Increase metolazone to 2.5 mg twice daily.  Please place a Sierra.  Strict I's and O's.  Discontinue Coreg.  Hold Entresto.  Continue hydralazine and nitrates.    2.  CAD–ischemic cardiomyopathy.  Last PCI in March 2025.  Continue DAPT.  Continue statin.    3.  BRADY on CKD–most likely secondary to low flow state.  Hold Entresto.  Avoid nephrotoxic medications.

## 2025-05-03 NOTE — PROGRESS NOTE ADULT - PROBLEM SELECTOR PLAN 2
- Most recent PCI 3/21/25  - Continue BB as above and Imdur. Patient was also on Ranexa at home.  - Continue daily ASA and Brilinta  - Continue high intensity statin therapy - Atorvastatin 80mg nightly. .  -Most recent PCI 3/21/25    -c/w GDMT:        DAPT: ASA and Brilinta        Statin: Atorvastatin 80mg daily        BetaBlocker: Coreg 6.25 mg BID, as above       Antianginals/Other: Imdur 60 mg daily

## 2025-05-03 NOTE — PROGRESS NOTE ADULT - SUBJECTIVE AND OBJECTIVE BOX
[facility]                                                         CARDIOLOGY AT [facility] (formerly [facility])                                                                  [address], [place] [address]                                                         Telephone: [phone]. Fax:[phone]                                                                             PROGRESS NOTE    Reason for follow up: HFrEF. Ongoing diuresis.  Update: Pt seen and examined at bedside this AM. Feels well, with no acute complaints. No overnight events on tele. This AM, dual-paced rhythm 60s.       Review of symptoms:   Cardiac:  No chest pain. No dyspnea. No palpitations.  Respiratory: no cough. No dyspnea  Gastrointestinal: No diarrhea. No abdominal pain. No bleeding.   Neuro: No focal neuro complaints.    Vitals:  T(C): 37.2 (05-03-25 @ 07:40), Max: 37.9 (05-03-25 @ 06:47)  HR: 74 (05-03-25 @ 07:40) (74 - 88)  BP: 95/60 (05-03-25 @ 07:40) (95/52 - 126/69)  RR: 18 (05-03-25 @ 07:40) (18 - 20)  SpO2: 94% (05-03-25 @ 07:40) (93% - 100%)  Wt(kg): --  I&O's Summary    02 May 2025 07:01  -  03 May 2025 07:00  --------------------------------------------------------  IN: 580 mL / OUT: 1600 mL / NET: -1020 mL          PHYSICAL EXAM:  Appearance: Comfortable. No acute distress  HEENT:  Atraumatic. Normocephalic.  Normal oral mucosa  Neurologic: A & O x 3, no gross focal deficits.  Cardiovascular: RRR S1 S2, No murmur, no rubs/gallops. No JVD  Respiratory: Lungs clear to auscultation, unlabored   Gastrointestinal:  Soft, Non-tender, + BS  Lower Extremities: 2+ Peripheral Pulses, No clubbing, cyanosis, or edema  Psychiatry: Patient is calm. No agitation.   Skin: warm and dry.    CURRENT CARDIAC MEDICATIONS:  buMETAnide Infusion 0.5 mG/Hr IV Continuous <Continuous>  buMETAnide Injectable 2 milliGRAM(s) IV Push once  carvedilol 6.25 milliGRAM(s) Oral every 12 hours  DOBUTamine Infusion 2.5 MICROgram(s)/kG/Min IV Continuous <Continuous>  hydrALAZINE 25 milliGRAM(s) Oral every 8 hours  isosorbide   mononitrate ER Tablet (IMDUR) 60 milliGRAM(s) Oral daily      CURRENT OTHER MEDICATIONS:  ertapenem  IVPB 500 milliGRAM(s) IV Intermittent every 24 hours  acetaminophen     Tablet .. 975 milliGRAM(s) Oral every 8 hours  gabapentin 400 milliGRAM(s) Oral daily  melatonin 3 milliGRAM(s) Oral at bedtime  methocarbamol 500 milliGRAM(s) Oral two times a day  ondansetron Injectable 4 milliGRAM(s) IV Push every 6 hours PRN Nausea and/or Vomiting  aluminum hydroxide/magnesium hydroxide/simethicone Suspension 30 milliLiter(s) Oral every 4 hours PRN Dyspepsia  loperamide 2 milliGRAM(s) Oral two times a day PRN Diarrhea  pantoprazole    Tablet 40 milliGRAM(s) Oral before breakfast  atorvastatin 80 milliGRAM(s) Oral at bedtime  dextrose 50% Injectable 25 Gram(s) IV Push once, Stop order after: 1 Doses  dextrose 50% Injectable 12.5 Gram(s) IV Push once, Stop order after: 1 Doses  dextrose 50% Injectable 25 Gram(s) IV Push once, Stop order after: 1 Doses  dextrose Oral Gel 15 Gram(s) Oral once, Stop order after: 1 Doses PRN Blood Glucose LESS THAN 70 milliGRAM(s)/deciliter  glucagon  Injectable 1 milliGRAM(s) IntraMuscular once, Stop order after: 1 Doses  insulin glargine Injectable (LANTUS) 5 Unit(s) SubCutaneous at bedtime  insulin lispro (ADMELOG) corrective regimen sliding scale   SubCutaneous three times a day before meals  aspirin  chewable 81 milliGRAM(s) Oral daily  benzocaine/menthol Lozenge 1 Lozenge Oral every 2 hours PRN Sore Throat  dextrose 5%. 1000 milliLiter(s) (50 mL/Hr) IV Continuous <Continuous>  dextrose 5%. 1000 milliLiter(s) (100 mL/Hr) IV Continuous <Continuous>  lidocaine   4% Patch 1 Patch Transdermal every 24 hours  potassium chloride    Tablet ER 40 milliEquivalent(s) Oral daily  potassium chloride    Tablet ER 40 milliEquivalent(s) Oral every 4 hours, Stop order after: 2 Doses  sodium bicarbonate 1300 milliGRAM(s) Oral three times a day  sodium chloride 0.65% Nasal 1 Spray(s) Both Nostrils two times a day  ticagrelor 90 milliGRAM(s) Oral every 12 hours      LABS:	 	        05-03    131[L]  |  88[L]  |  61.8[H]  ----------------------------<  197[H]  3.4[L]   |  27.0  |  2.68[H]    Ca    8.0[L]      03 May 2025 04:47  Mg     1.9     05-03      PT/INR/PTT ( 24 Apr 2025 05:01 )                       :                       :      15.1         :       31.9                  .        .                   .              .           .       1.34        .                                       Lipid Profile:   HgA1c:   TSH:     TELEMETRY:   ECG:    DIAGNOSTIC TESTING:  [ ] Echocardiogram:   [ ]  Catheterization:  [ ] Stress Test:    OTHER: 	                                                                [facility]                                                         CARDIOLOGY AT [facility] ([facility]                                                                  [address], [place] [address]                                                         Telephone: [phone]. Fax:[phone]                                                                             PROGRESS NOTE    Reason for follow up: HFrEF. Ongoing diuresis.  Update: Pt seen and examined at bedside this AM. Feels well, with no acute complaints. No overnight events on tele. This AM, dual-paced rhythm 60s. Kidney function worsening. Adjustments made to diuretics - see plan below. Repeat cardioMEMS on Monday, 48 hours s/p current regimen.      Review of symptoms:   Cardiac:  No chest pain. No dyspnea. No palpitations.  Respiratory: No cough. No dyspnea  Gastrointestinal: No diarrhea. No abdominal pain. No bleeding.   Neuro: No focal neuro complaints.  : +Painful urination      Vitals:  T(C): 37.2 (05-03-25 @ 07:40), Max: 37.9 (05-03-25 @ 06:47)  HR: 74 (05-03-25 @ 07:40) (74 - 88)  BP: 95/60 (05-03-25 @ 07:40) (95/52 - 126/69)  RR: 18 (05-03-25 @ 07:40) (18 - 20)  SpO2: 94% (05-03-25 @ 07:40) (93% - 100%)      I&O's Summary  02 May 2025 07:01  -  03 May 2025 07:00  --------------------------------------------------------  IN: 580 mL / OUT: 1600 mL / NET: -1020 mL      PHYSICAL EXAM:  Appearance: Comfortable. No acute distress  HEENT:  Atraumatic. Normocephalic.  Normal oral mucosa  Neurologic: A & O x 3, no gross focal deficits.  Cardiovascular: RRR S1 S2, No murmur, no rubs/gallops. No JVD  Respiratory: Gerard sounds diminished at b/l bases. Unlabored.  Gastrointestinal:  Soft, Non-tender, + BS  Lower Extremities: BLE 2+ pitting edema. Peripheral pulses. No clubbing or cyanosis.  Psychiatry: Patient is calm. No agitation.   Skin: warm and dry.      CURRENT CARDIAC MEDICATIONS:  buMETAnide Infusion 0.5 mG/Hr IV Continuous <Continuous>  buMETAnide Injectable 2 milliGRAM(s) IV Push once  carvedilol 6.25 milliGRAM(s) Oral every 12 hours  DOBUTamine Infusion 2.5 MICROgram(s)/kG/Min IV Continuous <Continuous>  hydrALAZINE 25 milliGRAM(s) Oral every 8 hours  isosorbide   mononitrate ER Tablet (IMDUR) 60 milliGRAM(s) Oral daily      CURRENT OTHER MEDICATIONS:  ertapenem  IVPB 500 milliGRAM(s) IV Intermittent every 24 hours  acetaminophen     Tablet .. 975 milliGRAM(s) Oral every 8 hours  gabapentin 400 milliGRAM(s) Oral daily  melatonin 3 milliGRAM(s) Oral at bedtime  methocarbamol 500 milliGRAM(s) Oral two times a day  ondansetron Injectable 4 milliGRAM(s) IV Push every 6 hours PRN Nausea and/or Vomiting  aluminum hydroxide/magnesium hydroxide/simethicone Suspension 30 milliLiter(s) Oral every 4 hours PRN Dyspepsia  loperamide 2 milliGRAM(s) Oral two times a day PRN Diarrhea  pantoprazole    Tablet 40 milliGRAM(s) Oral before breakfast  atorvastatin 80 milliGRAM(s) Oral at bedtime  dextrose 50% Injectable 25 Gram(s) IV Push once, Stop order after: 1 Doses  dextrose 50% Injectable 12.5 Gram(s) IV Push once, Stop order after: 1 Doses  dextrose 50% Injectable 25 Gram(s) IV Push once, Stop order after: 1 Doses  dextrose Oral Gel 15 Gram(s) Oral once, Stop order after: 1 Doses PRN Blood Glucose LESS THAN 70 milliGRAM(s)/deciliter  glucagon  Injectable 1 milliGRAM(s) IntraMuscular once, Stop order after: 1 Doses  insulin glargine Injectable (LANTUS) 5 Unit(s) SubCutaneous at bedtime  insulin lispro (ADMELOG) corrective regimen sliding scale   SubCutaneous three times a day before meals  aspirin  chewable 81 milliGRAM(s) Oral daily  benzocaine/menthol Lozenge 1 Lozenge Oral every 2 hours PRN Sore Throat  dextrose 5%. 1000 milliLiter(s) (50 mL/Hr) IV Continuous <Continuous>  dextrose 5%. 1000 milliLiter(s) (100 mL/Hr) IV Continuous <Continuous>  lidocaine   4% Patch 1 Patch Transdermal every 24 hours  potassium chloride    Tablet ER 40 milliEquivalent(s) Oral daily  potassium chloride    Tablet ER 40 milliEquivalent(s) Oral every 4 hours, Stop order after: 2 Doses  sodium bicarbonate 1300 milliGRAM(s) Oral three times a day  sodium chloride 0.65% Nasal 1 Spray(s) Both Nostrils two times a day  ticagrelor 90 milliGRAM(s) Oral every 12 hours      LABS:	 	    131[L]  |  88[L]  |  61.8[H]  ----------------------------<  197[H]  3.4[L]   |  27.0  |  2.68[H]    Ca    8.0[L]      03 May 2025 04:47  Mg     1.9     05-03    PT/INR/PTT ( 24 Apr 2025 05:01 )                       :                       :      15.1         :       31.9                  .        .                   .              .           .       1.34        .                                       Lipid Profile:   HgA1c:   TSH:     TELEMETRY:   ECG: < from: 12 Lead ECG (04.25.25 @ 08:06) >  Ventricular Rate 60 BPM    Atrial Rate 60 BPM    P-R Interval 126 ms    QRS Duration 144 ms    Q-T Interval 518 ms    QTC Calculation(Bazett) 518 ms    R Axis -39 degrees    T Axis 137 degrees    Diagnosis Line AV dual-paced rhythm  Abnormal ECG    < end of copied text >      DIAGNOSTIC TESTING:  [ ] Echocardiogram: < from: TTE W or WO Ultrasound Enhancing Agent (04.21.25 @ 13:52) >     CONCLUSIONS:      1. Technically difficult image quality.   2. Left ventricular cavity is normal in size. Left ventricular wall thickness is normal. Left ventricular systolic function is severely decreased with an ejection fraction visually estimated at 20 to 25 %.   3. Mid anteroseptal segment, entire apex, mid inferoseptal segment, and mid anterolateral segment are abnormal.   4. Left atrium is moderately dilated.   5. There is moderate (grade 2) left ventricular diastolic dysfunction.   6. Mildly enlarged right ventricular cavity size and mildly reduced right ventricular systolic function.   7. Mild to moderate aortic stenosis.   8. The peak transaortic velocity is 1.57 m/s, peak transaortic gradient is 9.9 mmHg and mean transaortic gradient is5.0 mmHg with an LVOT/aortic valve VTI ratio of 0.37. The effective orifice area is estimated at 1.04 cm² by the continuity equation and indexed at 0.62 cm²/m².   9. Mild mitral valve leaflet calcification.  10. Moderate mitral regurgitation.  11. Moderate tricuspid regurgitation.  12. Estimated pulmonary artery systolic pressure is 57 mmHg, moderate pulmonary hypertension.    < end of copied text >    [ ]  Catheterization: < from: Cardiac Catheterization (04.22.25 @ 14:51) >  Cath Lab Report    Diagnostic Cardiologist:       Kamaljit Ely MD   Referring Physician:           Raeann Gordon MD     Procedures Performed   Procedures:                 1.    Ultrasound Guided Access   2.    Venous Access - Right  Internal Jugular   3.    RHC     Indications: Congestive heart failure     Diagnostic Conclusions:   1. No CardioMEMS calibration required; mPAP and PAd values reported by CardioMEMS and PA catheter differed by < 5mmHg.    2. Elevated biventricular filling pressures: mRAP 18 mmHg, mPCWP 22mmHg.  3. Reduced CO/CI of 2.5 L/min  and 1.5 L/min/m2 by assumed Jose calculation and thermodilution.  4. SVR 2800 dynes; PVR 4.5 WILLARD.    5. Bikq-[id number] variation in pressure waveforms are secondary to AV dissociation.    Recommendations:   Patient may draw significant symptomatic benefit from cardiac resynchronization therapy (CRT); appreciate ongoing deliberation and clinical optimization toward that goal.      Acute complication:    No complications     < end of copied text >    [ ] Stress Test:    OTHER:

## 2025-05-03 NOTE — PROGRESS NOTE ADULT - SUBJECTIVE AND OBJECTIVE BOX
NEPHROLOGY INTERVAL HPI/OVERNIGHT EVENTS:    Examined earlier  No distress  denies HA CP no SOB  c/o dysuria     MEDICATIONS  (STANDING):  acetaminophen     Tablet .. 975 milliGRAM(s) Oral every 8 hours  aspirin  chewable 81 milliGRAM(s) Oral daily  atorvastatin 80 milliGRAM(s) Oral at bedtime  buMETAnide 4 milliGRAM(s) Oral <User Schedule>  carvedilol 6.25 milliGRAM(s) Oral every 12 hours  dextrose 5%. 1000 milliLiter(s) (50 mL/Hr) IV Continuous <Continuous>  dextrose 5%. 1000 milliLiter(s) (100 mL/Hr) IV Continuous <Continuous>  dextrose 50% Injectable 25 Gram(s) IV Push once  dextrose 50% Injectable 12.5 Gram(s) IV Push once  dextrose 50% Injectable 25 Gram(s) IV Push once  ertapenem  IVPB 500 milliGRAM(s) IV Intermittent every 24 hours  gabapentin 400 milliGRAM(s) Oral daily  glucagon  Injectable 1 milliGRAM(s) IntraMuscular once  hydrALAZINE 25 milliGRAM(s) Oral every 8 hours  insulin glargine Injectable (LANTUS) 5 Unit(s) SubCutaneous at bedtime  insulin lispro (ADMELOG) corrective regimen sliding scale   SubCutaneous three times a day before meals  isosorbide   mononitrate ER Tablet (IMDUR) 60 milliGRAM(s) Oral daily  lidocaine   4% Patch 1 Patch Transdermal every 24 hours  melatonin 3 milliGRAM(s) Oral at bedtime  methocarbamol 500 milliGRAM(s) Oral two times a day  metolazone 2.5 milliGRAM(s) Oral <User Schedule>  pantoprazole    Tablet 40 milliGRAM(s) Oral before breakfast  sacubitril 97 mG/valsartan 103 mG 1 Tablet(s) Oral two times a day  sodium bicarbonate 1300 milliGRAM(s) Oral three times a day  sodium chloride 0.65% Nasal 1 Spray(s) Both Nostrils two times a day  ticagrelor 90 milliGRAM(s) Oral every 12 hours    MEDICATIONS  (PRN):  aluminum hydroxide/magnesium hydroxide/simethicone Suspension 30 milliLiter(s) Oral every 4 hours PRN Dyspepsia  benzocaine/menthol Lozenge 1 Lozenge Oral every 2 hours PRN Sore Throat  dextrose Oral Gel 15 Gram(s) Oral once PRN Blood Glucose LESS THAN 70 milliGRAM(s)/deciliter  loperamide 2 milliGRAM(s) Oral two times a day PRN Diarrhea  ondansetron Injectable 4 milliGRAM(s) IV Push every 6 hours PRN Nausea and/or Vomiting      Allergies    No Known Allergies    Intolerances        Vital Signs Last 24 Hrs  T(C): 37.2 (03 May 2025 07:40), Max: 37.9 (03 May 2025 06:47)  T(F): 99 (03 May 2025 07:40), Max: 100.3 (03 May 2025 06:47)  HR: 74 (03 May 2025 07:40) (74 - 88)  BP: 95/60 (03 May 2025 07:40) (95/52 - 148/72)  BP(mean): 97 (02 May 2025 12:45) (97 - 97)  RR: 18 (03 May 2025 07:40) (18 - 20)  SpO2: 94% (03 May 2025 07:40) (93% - 100%)    Parameters below as of 03 May 2025 07:40  Patient On (Oxygen Delivery Method): room air      Daily     Daily Weight in k.9 (03 May 2025 07:40)    PHYSICAL EXAM:  GENERAL: NAD frail  HEENT: No periorbital edema  ENMT:  Moist mucous membranes  NECK: Supple, No JVD  NERVOUS SYSTEM:  Alert & Oriented X 3  CHEST/LUNG: Diminished BS noted  HEART: Regular rate and rhythm; No rub  ABDOMEN: Soft, Nontender, +BS  EXTREMITIES: + dependent edema less    LABS:        131[L]  |  88[L]  |  61.8[H]  ----------------------------<  197[H]  3.4[L]   |  27.0  |  2.68[H]    Ca    8.0[L]      03 May 2025 04:47  Mg     1.9             Urinalysis Basic - ( 03 May 2025 04:47 )    Color: x / Appearance: x / SG: x / pH: x  Gluc: 197 mg/dL / Ketone: x  / Bili: x / Urobili: x   Blood: x / Protein: x / Nitrite: x   Leuk Esterase: x / RBC: x / WBC x   Sq Epi: x / Non Sq Epi: x / Bacteria: x      Magnesium: 1.9 mg/dL ( @ 04:47)          RADIOLOGY & ADDITIONAL TESTS:

## 2025-05-03 NOTE — PROGRESS NOTE ADULT - ASSESSMENT
74y old female with PMH of ICM/HFrEF (LVEF 35-40%), CAD s/p PCI, Mobitz II s/p PPM (MD NereydaT), HTN, HLD, PAD with chronic right LE wound, DM2, and CKD3 who presented from CHF clinic with weight gain of ~20 lbs in the past 9 days, despite escalation of PO diuretics.      She was recently admitted on 3/19/25 with c/o CP and was found to have NSTEMI. She underwent LHC on 3/21 s/p PCI with 1 ZEINAB to RCA, which showed mid-distal LAD: severe stenosis, OM1 and OM2: severe stenosis, mid ISR: 50%, distal ISR: 99%. She was discharged home 3/31, and her CardioMEMS was not at goal and rising. Pt reports poor UOP response to home diuretics.   TTE 4/21/25: LVEF 20-25%. Now s/p CRT-D on 4/24. Last cardioMEMS 27-28 on 5/2 (goal 14).

## 2025-05-03 NOTE — PROGRESS NOTE ADULT - PROBLEM SELECTOR PLAN 3
- Baseline Cr ~1.6-1.7?  - Renal function had been improving with diuresis but increased again today likely due to hypervolemia given rise in CardioMEMS PAD.  - C/w diuresis as above.  - Avoid nephrotoxics  - Nephrology following. .  -Baseline Cr ~1.6-1.7?  -Renal function worsening, with BUN/Cr uptrending. Currently 61.8/2.68 from 51.3/2.50    -Diuresis plan, as above  -Holding Entresto, as above  -Avoid nephrotoxics  -Nephrology following. Appreciate recs. .  -Baseline Cr ~1.6-1.7?  -Renal function worsening, with BUN/Cr uptrending. Currently 61.8/2.68 from 51.3/2.50    -Diuresis plan, as above  -Holding Entresto, as above  -Avoid nephrotoxics  -Nephrology following. Appreciate recs.    Plan discussed with Dr. Ellis and Dr. Decker.

## 2025-05-04 LAB
ANION GAP SERPL CALC-SCNC: 15 MMOL/L — SIGNIFICANT CHANGE UP (ref 5–17)
BUN SERPL-MCNC: 66.1 MG/DL — HIGH (ref 8–20)
CALCIUM SERPL-MCNC: 8.1 MG/DL — LOW (ref 8.4–10.5)
CHLORIDE SERPL-SCNC: 88 MMOL/L — LOW (ref 96–108)
CO2 SERPL-SCNC: 26 MMOL/L — SIGNIFICANT CHANGE UP (ref 22–29)
CREAT SERPL-MCNC: 2.65 MG/DL — HIGH (ref 0.5–1.3)
EGFR: 19 ML/MIN/1.73M2 — LOW
EGFR: 19 ML/MIN/1.73M2 — LOW
GLUCOSE BLDC GLUCOMTR-MCNC: 180 MG/DL — HIGH (ref 70–99)
GLUCOSE BLDC GLUCOMTR-MCNC: 208 MG/DL — HIGH (ref 70–99)
GLUCOSE BLDC GLUCOMTR-MCNC: 223 MG/DL — HIGH (ref 70–99)
GLUCOSE BLDC GLUCOMTR-MCNC: 253 MG/DL — HIGH (ref 70–99)
GLUCOSE SERPL-MCNC: 203 MG/DL — HIGH (ref 70–99)
POTASSIUM SERPL-MCNC: 4.3 MMOL/L — SIGNIFICANT CHANGE UP (ref 3.5–5.3)
POTASSIUM SERPL-SCNC: 4.3 MMOL/L — SIGNIFICANT CHANGE UP (ref 3.5–5.3)
PROCALCITONIN SERPL-MCNC: 0.39 NG/ML — HIGH (ref 0.02–0.1)
SODIUM SERPL-SCNC: 129 MMOL/L — LOW (ref 135–145)

## 2025-05-04 PROCEDURE — 99233 SBSQ HOSP IP/OBS HIGH 50: CPT

## 2025-05-04 RX ORDER — BUMETANIDE 1 MG/1
2 TABLET ORAL
Qty: 20 | Refills: 0 | Status: DISCONTINUED | OUTPATIENT
Start: 2025-05-04 | End: 2025-05-05

## 2025-05-04 RX ORDER — ISOSORBDIE DINITRATE 30 MG/1
20 TABLET ORAL THREE TIMES A DAY
Refills: 0 | Status: DISCONTINUED | OUTPATIENT
Start: 2025-05-04 | End: 2025-05-09

## 2025-05-04 RX ADMIN — Medication 975 MILLIGRAM(S): at 14:57

## 2025-05-04 RX ADMIN — Medication 1300 MILLIGRAM(S): at 06:23

## 2025-05-04 RX ADMIN — Medication 25 MILLIGRAM(S): at 06:24

## 2025-05-04 RX ADMIN — LIDOCAINE HYDROCHLORIDE 1 PATCH: 20 JELLY TOPICAL at 18:32

## 2025-05-04 RX ADMIN — BUMETANIDE 10 MG/HR: 1 TABLET ORAL at 19:00

## 2025-05-04 RX ADMIN — METHOCARBAMOL 500 MILLIGRAM(S): 500 TABLET, FILM COATED ORAL at 17:35

## 2025-05-04 RX ADMIN — INSULIN LISPRO 6: 100 INJECTION, SOLUTION INTRAVENOUS; SUBCUTANEOUS at 12:27

## 2025-05-04 RX ADMIN — Medication 975 MILLIGRAM(S): at 13:57

## 2025-05-04 RX ADMIN — Medication 37.5 MILLIGRAM(S): at 22:55

## 2025-05-04 RX ADMIN — TICAGRELOR 90 MILLIGRAM(S): 90 TABLET ORAL at 17:36

## 2025-05-04 RX ADMIN — ATORVASTATIN CALCIUM 80 MILLIGRAM(S): 80 TABLET, FILM COATED ORAL at 22:55

## 2025-05-04 RX ADMIN — Medication 975 MILLIGRAM(S): at 22:55

## 2025-05-04 RX ADMIN — Medication 40 MILLIGRAM(S): at 06:24

## 2025-05-04 RX ADMIN — Medication 40 MILLIEQUIVALENT(S): at 12:02

## 2025-05-04 RX ADMIN — INSULIN LISPRO 4: 100 INJECTION, SOLUTION INTRAVENOUS; SUBCUTANEOUS at 09:01

## 2025-05-04 RX ADMIN — ISOSORBDIE DINITRATE 20 MILLIGRAM(S): 30 TABLET ORAL at 12:06

## 2025-05-04 RX ADMIN — INSULIN GLARGINE-YFGN 5 UNIT(S): 100 INJECTION, SOLUTION SUBCUTANEOUS at 22:56

## 2025-05-04 RX ADMIN — GABAPENTIN 400 MILLIGRAM(S): 400 CAPSULE ORAL at 12:02

## 2025-05-04 RX ADMIN — Medication 1300 MILLIGRAM(S): at 13:57

## 2025-05-04 RX ADMIN — Medication 37.5 MILLIGRAM(S): at 13:58

## 2025-05-04 RX ADMIN — Medication 1 SPRAY(S): at 06:24

## 2025-05-04 RX ADMIN — BUMETANIDE 2.5 MG/HR: 1 TABLET ORAL at 06:22

## 2025-05-04 RX ADMIN — Medication 1300 MILLIGRAM(S): at 22:55

## 2025-05-04 RX ADMIN — Medication 975 MILLIGRAM(S): at 06:23

## 2025-05-04 RX ADMIN — Medication 3 MILLIGRAM(S): at 22:56

## 2025-05-04 RX ADMIN — ISOSORBDIE DINITRATE 20 MILLIGRAM(S): 30 TABLET ORAL at 17:35

## 2025-05-04 RX ADMIN — LIDOCAINE HYDROCHLORIDE 1 PATCH: 20 JELLY TOPICAL at 12:02

## 2025-05-04 RX ADMIN — INSULIN LISPRO 2: 100 INJECTION, SOLUTION INTRAVENOUS; SUBCUTANEOUS at 17:36

## 2025-05-04 RX ADMIN — Medication 81 MILLIGRAM(S): at 12:02

## 2025-05-04 RX ADMIN — Medication 1 SPRAY(S): at 17:36

## 2025-05-04 RX ADMIN — TICAGRELOR 90 MILLIGRAM(S): 90 TABLET ORAL at 06:23

## 2025-05-04 RX ADMIN — METHOCARBAMOL 500 MILLIGRAM(S): 500 TABLET, FILM COATED ORAL at 06:24

## 2025-05-04 RX ADMIN — BUMETANIDE 10 MG/HR: 1 TABLET ORAL at 09:01

## 2025-05-04 RX ADMIN — ERTAPENEM SODIUM 100 MILLIGRAM(S): 1 INJECTION, POWDER, LYOPHILIZED, FOR SOLUTION INTRAMUSCULAR; INTRAVENOUS at 16:33

## 2025-05-04 RX ADMIN — DOBUTAMINE 5.48 MICROGRAM(S)/KG/MIN: 250 INJECTION INTRAVENOUS at 06:23

## 2025-05-04 NOTE — PROGRESS NOTE ADULT - SUBJECTIVE AND OBJECTIVE BOX
[facility]                                                         CARDIOLOGY AT [place] [address]                                                         Telephone: [phone]. Fax:[phone]                                                                             PROGRESS NOTE    Reason for follow up: Heart failure     Review of symptoms:   Cardiac:  No chest pain. No dyspnea. No palpitations.  Respiratory: no cough. No dyspnea  Gastrointestinal: No diarrhea. No abdominal pain. No bleeding.   Neuro: No focal neuro complaints.  All other ROS negative unless otherwise listed above    PHYSICAL EXAM:  Appearance: Comfortable. No acute distress, severe orthopnea   HEENT:  Atraumatic. Normocephalic.  Normal oral mucosa  Neurologic: A & O x 3, no gross focal deficits.  Cardiovascular: RRR S1 S2, No murmur, no rubs/gallops. No JVD  Respiratory: diminished lungs   Gastrointestinal:  Soft, Non-tender, + BS  Lower Extremities: 2+ Peripheral Pulses, No clubbing, cyanosis, +2 LE edema   Psychiatry: Patient is calm. No agitation.   Skin: warm and dry.    Vitals:  T(C): 36.8 (05-04-25 @ 06:15), Max: 37.4 (05-03-25 @ 22:30)  HR: 75 (05-04-25 @ 06:15) (75 - 80)  BP: 124/69 (05-04-25 @ 06:15) (107/60 - 127/70)  RR: 19 (05-04-25 @ 06:15) (18 - 19)  SpO2: 99% (05-04-25 @ 06:15) (95% - 100%)  Wt(kg): --  I&O's Summary    03 May 2025 07:01  -  04 May 2025 07:00  --------------------------------------------------------  IN: 536 mL / OUT: 900 mL / NET: -364 mL    CURRENT CARDIAC MEDICATIONS:  buMETAnide Infusion 2 mG/Hr IV Continuous <Continuous>  DOBUTamine Infusion 2.5 MICROgram(s)/kG/Min IV Continuous <Continuous>  hydrALAZINE 25 milliGRAM(s) Oral every 8 hours  isosorbide   mononitrate ER Tablet (IMDUR) 60 milliGRAM(s) Oral daily  metolazone 5 milliGRAM(s) Oral <User Schedule>    CURRENT OTHER MEDICATIONS:  ertapenem  IVPB 500 milliGRAM(s) IV Intermittent every 24 hours  acetaminophen     Tablet .. 975 milliGRAM(s) Oral every 8 hours  gabapentin 400 milliGRAM(s) Oral daily  melatonin 3 milliGRAM(s) Oral at bedtime  methocarbamol 500 milliGRAM(s) Oral two times a day  ondansetron Injectable 4 milliGRAM(s) IV Push every 6 hours PRN Nausea and/or Vomiting  aluminum hydroxide/magnesium hydroxide/simethicone Suspension 30 milliLiter(s) Oral every 4 hours PRN Dyspepsia  loperamide 2 milliGRAM(s) Oral two times a day PRN Diarrhea  pantoprazole    Tablet 40 milliGRAM(s) Oral before breakfast  atorvastatin 80 milliGRAM(s) Oral at bedtime  dextrose 50% Injectable 25 Gram(s) IV Push once, Stop order after: 1 Doses  dextrose 50% Injectable 12.5 Gram(s) IV Push once, Stop order after: 1 Doses  dextrose 50% Injectable 25 Gram(s) IV Push once, Stop order after: 1 Doses  dextrose Oral Gel 15 Gram(s) Oral once, Stop order after: 1 Doses PRN Blood Glucose LESS THAN 70 milliGRAM(s)/deciliter  glucagon  Injectable 1 milliGRAM(s) IntraMuscular once, Stop order after: 1 Doses  insulin glargine Injectable (LANTUS) 5 Unit(s) SubCutaneous at bedtime  insulin lispro (ADMELOG) corrective regimen sliding scale   SubCutaneous three times a day before meals  aspirin  chewable 81 milliGRAM(s) Oral daily  benzocaine/menthol Lozenge 1 Lozenge Oral every 2 hours PRN Sore Throat  dextrose 5%. 1000 milliLiter(s) (100 mL/Hr) IV Continuous <Continuous>  dextrose 5%. 1000 milliLiter(s) (50 mL/Hr) IV Continuous <Continuous>  lidocaine   4% Patch 1 Patch Transdermal every 24 hours  potassium chloride    Tablet ER 40 milliEquivalent(s) Oral daily  sodium bicarbonate 1300 milliGRAM(s) Oral three times a day  sodium chloride 0.65% Nasal 1 Spray(s) Both Nostrils two times a day  ticagrelor 90 milliGRAM(s) Oral every 12 hours    LABS:	 	    05-04    129[L]  |  88[L]  |  66.1[H]  ----------------------------<  203[H]  4.3   |  26.0  |  2.65[H]    Ca    8.1[L]      04 May 2025 05:41  Mg     1.9     05-03    PT/INR/PTT ( 24 Apr 2025 05:01 )                       :                       :      15.1         :       31.9                  .        .                   .              .           .       1.34        .                                       Lipid Profile:   HgA1c:   TSH:

## 2025-05-04 NOTE — PROGRESS NOTE ADULT - SUBJECTIVE AND OBJECTIVE BOX
Patient is a 74y old  Female with CHF exacerbation      Chart reviewed , pt is seen this am , son is at the bedside   pt is with no complaints   she is with pain when urinates            ALLERGIES:  No Known Allergies    MEDICATIONS  (STANDING):  acetaminophen     Tablet .. 975 milliGRAM(s) Oral every 8 hours  aspirin  chewable 81 milliGRAM(s) Oral daily  atorvastatin 80 milliGRAM(s) Oral at bedtime  buMETAnide 4 milliGRAM(s) Oral <User Schedule>  carvedilol 6.25 milliGRAM(s) Oral every 12 hours  dextrose 5%. 1000 milliLiter(s) (100 mL/Hr) IV Continuous <Continuous>  dextrose 5%. 1000 milliLiter(s) (50 mL/Hr) IV Continuous <Continuous>  dextrose 50% Injectable 25 Gram(s) IV Push once  dextrose 50% Injectable 12.5 Gram(s) IV Push once  dextrose 50% Injectable 25 Gram(s) IV Push once  epoetin kristina-epbx (RETACRIT) Injectable 76728 Unit(s) SubCutaneous once  gabapentin 400 milliGRAM(s) Oral daily  glucagon  Injectable 1 milliGRAM(s) IntraMuscular once  hydrALAZINE 25 milliGRAM(s) Oral every 8 hours  insulin glargine Injectable (LANTUS) 5 Unit(s) SubCutaneous at bedtime  insulin lispro (ADMELOG) corrective regimen sliding scale   SubCutaneous three times a day before meals  isosorbide   mononitrate ER Tablet (IMDUR) 60 milliGRAM(s) Oral daily  lidocaine   4% Patch 1 Patch Transdermal every 24 hours  melatonin 3 milliGRAM(s) Oral at bedtime  methocarbamol 500 milliGRAM(s) Oral two times a day  metolazone 2.5 milliGRAM(s) Oral <User Schedule>  pantoprazole    Tablet 40 milliGRAM(s) Oral before breakfast  sacubitril 97 mG/valsartan 103 mG 1 Tablet(s) Oral two times a day  sodium bicarbonate 1300 milliGRAM(s) Oral three times a day  sodium chloride 0.65% Nasal 1 Spray(s) Both Nostrils two times a day  ticagrelor 90 milliGRAM(s) Oral every 12 hours    MEDICATIONS  (PRN):  aluminum hydroxide/magnesium hydroxide/simethicone Suspension 30 milliLiter(s) Oral every 4 hours PRN Dyspepsia  benzocaine/menthol Lozenge 1 Lozenge Oral every 2 hours PRN Sore Throat  dextrose Oral Gel 15 Gram(s) Oral once PRN Blood Glucose LESS THAN 70 milliGRAM(s)/deciliter  loperamide 2 milliGRAM(s) Oral two times a day PRN Diarrhea  ondansetron Injectable 4 milliGRAM(s) IV Push every 6 hours PRN Nausea and/or Vomiting    Vital Signs Last 24 Hrs  T(C): 36.7 (04 May 2025 07:28), Max: 37.4 (03 May 2025 22:30)  T(F): 98.1 (04 May 2025 07:28), Max: 99.4 (03 May 2025 22:30)  HR: 68 (04 May 2025 07:28) (68 - 80)  BP: 96/60 (04 May 2025 07:28) (96/60 - 127/70)  BP(mean): --  RR: 18 (04 May 2025 07:28) (18 - 19)  SpO2: 93% (04 May 2025 07:28) (93% - 100%)    Parameters below as of 04 May 2025 07:28  Patient On (Oxygen Delivery Method): room air      PHYSICAL EXAM:  General: awake alert   Neck: supple   Lungs: CTA bilateral   Cardio: RRR, S1/S2, No murmur  Abdomen: Soft, lwoer abd tenderness  Nondistended; Bowel sounds present  Extremities: No calf tenderness, No pitting edema   schroeder in place       05-04    129[L]  |  88[L]  |  66.1[H]  ----------------------------<  203[H]  4.3   |  26.0  |  2.65[H]    Ca    8.1[L]      04 May 2025 05:41  Mg     1.9     05-03                  CAPILLARY BLOOD GLUCOSE      POCT Blood Glucose.: 208 mg/dL (04 May 2025 08:36)  POCT Blood Glucose.: 282 mg/dL (03 May 2025 22:32)  POCT Blood Glucose.: 190 mg/dL (03 May 2025 17:07)  POCT Blood Glucose.: 211 mg/dL (03 May 2025 12:33)    Urinalysis Basic - ( 02 May 2025 05:00 )    Color: x / Appearance: x / SG: x / pH: x  Gluc: 169 mg/dL / Ketone: x  / Bili: x / Urobili: x   Blood: x / Protein: x / Nitrite: x   Leuk Esterase: x / RBC: x / WBC x   Sq Epi: x / Non Sq Epi: x / Bacteria: x          RADIOLOGY & ADDITIONAL TESTS:

## 2025-05-04 NOTE — PROGRESS NOTE ADULT - NS ATTEND AMEND GEN_ALL_CORE FT
Patient seen and examined by me.    Vitals, labs, imaging, cardiac testing reviewed in HER.  Telemetry reviewed    74y old female with PMH of ICM/HFrEF (LVEF 35-40%), CAD s/p PCI, Mobitz II s/p PPM (MD NereydaT), HTN, HLD, PAD with chronic right LE wound, DM2, and CKD3 who presented from CHF clinic with weight gain of ~20 lbs in the past 9 days, despite escalation of PO diuretics.      She was recently admitted on 3/19/25 with c/o CP and was found to have NSTEMI. She underwent LHC on 3/21 s/p PCI with 1 ZEINAB to RCA, which showed mid-distal LAD: severe stenosis, OM1 and OM2: severe stenosis, mid ISR: 50%, distal ISR: 99%. She was discharged home 3/31, and her CardioMEMS was not at goal and rising. Pt reports poor UOP response to home diuretics.   TTE 4/21/25: LVEF 20-25%. Now s/p CRT-D on 4/24. Last cardioMEMS 27-28 on 5/2 (goal 14).    1.  Acute on chronic systolic heart failure–patient still appears to be volume overloaded and not responding well to diuretics.  Creatinine continues to trend up without significant improvement in urine output.  Patient appears to be in the low flow state.  Will con't dobutamine drip at 2.5 and increase Bumex drip to 2 mg/h after 2 mg IV push dose.  Increase metolazone to 5 mg twice daily.  Strict I's and O's.  Discontinue Coreg.  Hold Entresto.  Continue hydralazine and nitrates.    2.  CAD–ischemic cardiomyopathy.  Last PCI in March 2025.  Continue DAPT.  Continue statin.    3.  BRADY on CKD–most likely secondary to low flow state.  Hold Entresto.  Avoid nephrotoxic medications.

## 2025-05-04 NOTE — PROGRESS NOTE ADULT - PROBLEM SELECTOR PLAN 1
-ICM; most recent TTE 4/21/25: EF 20-25%  -s/p CRT-D on 4/24  -CardioMEMS PAD increased to 27-28 from 24.  -BUN/Cr uptrending. Per I&O documentation, pt fluid balance net -1L over 24 hr.  -On exam, BLE edema and diminished lung sounds at b/l bases noted.   - pt has been complaining of orthopnea.   - 5/3/25 bumex/dobutamine/metolazone combo was started. pt has poor urine output. only net negative 325 in 24h with 900 cc output.   - reviewed cardiomems data, her goal PAD is indeed 14, she was between 13-15 for months and only recently in the end of february did her numbers uptrend.   - increase bumex infusion to 2mg/hr and metolazone to 5mg BID   - Strict I/O for 4 hours with hourly output, discussed with RN   - elda in place for strict I/O   - PA Diastolic reading today is 25 which is still 11 points higher than baseline.   - at this point pt has been trending back and forth in this range and has been admitted since 4/9/25. She has not achieved euvolemia once. We are escalating GDMT but her renal function is poor, it is documented her CKD is stage III, more likely its stage IV. her GFR drops to <16 several times.   - Recommend nephrology to do aquapheresis/ultrafiltration.  - cannot tolerate ACE/ARB/ARNI/MRA due to renal function  - pt needs afterload reduction, increase hydralazine to 37.5mg TID, continue isordil 20mg TID   - coreg on hold while on dobutamine. keep dobutamine low dose 2.5mcg/kg/min   - strict I/O 1L fluid restriction diet.

## 2025-05-04 NOTE — PROGRESS NOTE ADULT - ASSESSMENT
74yoF with a history of coronary artery disease, heart failure, chronic kidney disease, hypertension, diabetes, and anemia who was recently hospitalized for NSTEMI with PCI and bacteremia who presented with dyspnea on exertion and weight gain. Continues on Bumex drip as per cardiology additional diuretics with metolazone / Bumex IV given 4/21. s/p repeat heart cath 4/22 with no need for MEMS recalibration however plan for upgarde to CRT-P vs CRT-D device 4/24, s/p Cardiac MR. Diuresis resumed by HF team 4/25, continue to monitor.     1-Acute on chronic systolic heart failure  Cardio mems high still   started dobutamine and bumex iv infusion 5/3 , d/w cardiology   output is poor   d/w nephrology re dialysis , Dr matute rec to cont current tx and monitor another 24 hours   cont  metoprolol, Imdur 60 mg daily, spironolactone, Entresto full dose, hydralazine tid   schroeder inserted 5/3 to monitor intake output   repeat CXR reviewed , no effusion or edema   - s/p repeat Heart cath 4/22, no recalibration of Mems needed    2- H/o Recurrent UTI   with urinary symptoms now UA positive   urine cx ordered 5/3 not send   urine cx from 4/25 ESBL reviewed sens  cont invanz   will d/w Dr Lam ID tomorrow     3-Chronic kidney disease stage 4  - Continue to monitor while on diuresis  - Nephro on board , may need HD for fluid removal per cardio   cont to monitor C mems     4-Hypomagnesemia / hypokalemia   replaced     5-Right knee pain  - knee xray showing effusion, possible in setting of fall  - c/w current pain regimen  - CT knee result noted;   - ortho consulted, sp aspiration fluid neg for crystals/infections  - ROM improving     6-Diarrhea  resolved   - possibly abx related      7-Coronary artery disease  - Continue on aspirin, ticagrelor, and atorvastatin  - Recent cardiac catheterization with PCI, Brillinta dosage increased to 90 BID from 60 as per HF  - initial trop 140 -> 118  - c/w maalox     8-Hyponatremia   labile   improving , na 129 today   - Hypervolemic on presentation  - s/p Hypertonic saline 2% (150ml over 30mins) for hyponatremia      9-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

## 2025-05-04 NOTE — PROGRESS NOTE ADULT - SUBJECTIVE AND OBJECTIVE BOX
NEPHROLOGY INTERVAL HPI/OVERNIGHT EVENTS:    Examined earlier  No distress  denies HA CP no SOB  On Dobutamine/ Bumex ggt   900 cc UOP/ past 24 hrs    MEDICATIONS  (STANDING):  acetaminophen     Tablet .. 975 milliGRAM(s) Oral every 8 hours  aspirin  chewable 81 milliGRAM(s) Oral daily  atorvastatin 80 milliGRAM(s) Oral at bedtime  buMETAnide Infusion 2 mG/Hr (10 mL/Hr) IV Continuous <Continuous>  dextrose 5%. 1000 milliLiter(s) (100 mL/Hr) IV Continuous <Continuous>  dextrose 5%. 1000 milliLiter(s) (50 mL/Hr) IV Continuous <Continuous>  dextrose 50% Injectable 25 Gram(s) IV Push once  dextrose 50% Injectable 12.5 Gram(s) IV Push once  dextrose 50% Injectable 25 Gram(s) IV Push once  DOBUTamine Infusion 2.5 MICROgram(s)/kG/Min (5.48 mL/Hr) IV Continuous <Continuous>  ertapenem  IVPB 500 milliGRAM(s) IV Intermittent every 24 hours  gabapentin 400 milliGRAM(s) Oral daily  glucagon  Injectable 1 milliGRAM(s) IntraMuscular once  hydrALAZINE 37.5 milliGRAM(s) Oral three times a day  insulin glargine Injectable (LANTUS) 5 Unit(s) SubCutaneous at bedtime  insulin lispro (ADMELOG) corrective regimen sliding scale   SubCutaneous three times a day before meals  isosorbide   dinitrate Tablet (ISORDIL) 20 milliGRAM(s) Oral three times a day  lidocaine   4% Patch 1 Patch Transdermal every 24 hours  melatonin 3 milliGRAM(s) Oral at bedtime  methocarbamol 500 milliGRAM(s) Oral two times a day  metolazone 5 milliGRAM(s) Oral <User Schedule>  pantoprazole    Tablet 40 milliGRAM(s) Oral before breakfast  potassium chloride    Tablet ER 40 milliEquivalent(s) Oral daily  sodium bicarbonate 1300 milliGRAM(s) Oral three times a day  sodium chloride 0.65% Nasal 1 Spray(s) Both Nostrils two times a day  ticagrelor 90 milliGRAM(s) Oral every 12 hours    MEDICATIONS  (PRN):  aluminum hydroxide/magnesium hydroxide/simethicone Suspension 30 milliLiter(s) Oral every 4 hours PRN Dyspepsia  benzocaine/menthol Lozenge 1 Lozenge Oral every 2 hours PRN Sore Throat  dextrose Oral Gel 15 Gram(s) Oral once PRN Blood Glucose LESS THAN 70 milliGRAM(s)/deciliter  loperamide 2 milliGRAM(s) Oral two times a day PRN Diarrhea  ondansetron Injectable 4 milliGRAM(s) IV Push every 6 hours PRN Nausea and/or Vomiting      Allergies    No Known Allergies    Intolerances        Vital Signs Last 24 Hrs  T(C): 36.7 (04 May 2025 07:28), Max: 37.4 (03 May 2025 22:30)  T(F): 98.1 (04 May 2025 07:28), Max: 99.4 (03 May 2025 22:30)  HR: 68 (04 May 2025 07:28) (68 - 80)  BP: 96/60 (04 May 2025 07:28) (96/60 - 127/70)  BP(mean): --  RR: 18 (04 May 2025 07:28) (18 - 19)  SpO2: 93% (04 May 2025 07:28) (93% - 100%)    Parameters below as of 04 May 2025 07:28  Patient On (Oxygen Delivery Method): room air      Daily     Daily Weight in k.9 (04 May 2025 08:55)    PHYSICAL EXAM:  GENERAL: NAD frail  HEENT: No periorbital edema  ENMT:  Moist mucous membranes  NECK: Supple, No JVD  NERVOUS SYSTEM:  Alert & Oriented X 3  CHEST/LUNG: Diminished BS noted  HEART: Regular rate and rhythm; No rub  ABDOMEN: Soft, Nontender, +BS  EXTREMITIES: + dependent edema less    LABS:        129[L]  |  88[L]  |  66.1[H]  ----------------------------<  203[H]  4.3   |  26.0  |  2.65[H]    Ca    8.1[L]      04 May 2025 05:41  Mg     1.9     03        Urinalysis Basic - ( 04 May 2025 05:41 )    Color: x / Appearance: x / SG: x / pH: x  Gluc: 203 mg/dL / Ketone: x  / Bili: x / Urobili: x   Blood: x / Protein: x / Nitrite: x   Leuk Esterase: x / RBC: x / WBC x   Sq Epi: x / Non Sq Epi: x / Bacteria: x              RADIOLOGY & ADDITIONAL TESTS:

## 2025-05-04 NOTE — PROGRESS NOTE ADULT - ASSESSMENT
73 y/o F w h/o ICM/HFrEF (LVEF 35-40%), CAD s/p PCI, Mobitz II s/p PPM (MD NereydaT), HTN, HLD, PAD with chronic right LE wound, DM2, and CKD3, who was BIBA from CHF clinic due to hypervolemia, weight gain of ~20 lbs in the past 9 days    BRADY on CKD stage III vs progression of dz likely the latter  Suspect CRS diuretic effect also contributing   Serum Cr 2.5--> 2.6 --> 2.6  ICM (EF 25-30%) admitted with decompensated CHF  Pt clinically improved on current Rx  - pt to continue Bumex now ggt and Metolazone as per cardiology and accept azotemia  - cont Entresto as per cardiology  - no current need for RRT but may need it in future    Hyponatremia likely dilutional should improve w diuretics    HypoKalemia likely 2/2 diuretics will add daily potassium supplement     Needs strict I & O s  Renally dose meds, avoid nephrotoxic agents  Monitor labs will follow

## 2025-05-05 LAB
ALBUMIN SERPL ELPH-MCNC: 3.2 G/DL — LOW (ref 3.3–5.2)
ALP SERPL-CCNC: 138 U/L — HIGH (ref 40–120)
ALT FLD-CCNC: <5 U/L — SIGNIFICANT CHANGE UP
ANION GAP SERPL CALC-SCNC: 15 MMOL/L — SIGNIFICANT CHANGE UP (ref 5–17)
AST SERPL-CCNC: 16 U/L — SIGNIFICANT CHANGE UP
BASOPHILS # BLD AUTO: 0.07 K/UL — SIGNIFICANT CHANGE UP (ref 0–0.2)
BASOPHILS NFR BLD AUTO: 0.7 % — SIGNIFICANT CHANGE UP (ref 0–2)
BILIRUB SERPL-MCNC: 0.5 MG/DL — SIGNIFICANT CHANGE UP (ref 0.4–2)
BUN SERPL-MCNC: 65.9 MG/DL — HIGH (ref 8–20)
CALCIUM SERPL-MCNC: 8.5 MG/DL — SIGNIFICANT CHANGE UP (ref 8.4–10.5)
CHLORIDE SERPL-SCNC: 90 MMOL/L — LOW (ref 96–108)
CO2 SERPL-SCNC: 26 MMOL/L — SIGNIFICANT CHANGE UP (ref 22–29)
CREAT SERPL-MCNC: 2.59 MG/DL — HIGH (ref 0.5–1.3)
EGFR: 19 ML/MIN/1.73M2 — LOW
EGFR: 19 ML/MIN/1.73M2 — LOW
EOSINOPHIL # BLD AUTO: 0.63 K/UL — HIGH (ref 0–0.5)
EOSINOPHIL NFR BLD AUTO: 6.1 % — HIGH (ref 0–6)
GLUCOSE BLDC GLUCOMTR-MCNC: 166 MG/DL — HIGH (ref 70–99)
GLUCOSE BLDC GLUCOMTR-MCNC: 237 MG/DL — HIGH (ref 70–99)
GLUCOSE BLDC GLUCOMTR-MCNC: 288 MG/DL — HIGH (ref 70–99)
GLUCOSE BLDC GLUCOMTR-MCNC: 310 MG/DL — HIGH (ref 70–99)
GLUCOSE SERPL-MCNC: 151 MG/DL — HIGH (ref 70–99)
HCT VFR BLD CALC: 27.2 % — LOW (ref 34.5–45)
HGB BLD-MCNC: 8.9 G/DL — LOW (ref 11.5–15.5)
IMM GRANULOCYTES # BLD AUTO: 0.06 K/UL — SIGNIFICANT CHANGE UP (ref 0–0.07)
IMM GRANULOCYTES NFR BLD AUTO: 0.6 % — SIGNIFICANT CHANGE UP (ref 0–0.9)
LYMPHOCYTES # BLD AUTO: 1.46 K/UL — SIGNIFICANT CHANGE UP (ref 1–3.3)
LYMPHOCYTES NFR BLD AUTO: 14.1 % — SIGNIFICANT CHANGE UP (ref 13–44)
MAGNESIUM SERPL-MCNC: 1.8 MG/DL — SIGNIFICANT CHANGE UP (ref 1.6–2.6)
MCHC RBC-ENTMCNC: 28.7 PG — SIGNIFICANT CHANGE UP (ref 27–34)
MCHC RBC-ENTMCNC: 32.7 G/DL — SIGNIFICANT CHANGE UP (ref 32–36)
MCV RBC AUTO: 87.7 FL — SIGNIFICANT CHANGE UP (ref 80–100)
MONOCYTES # BLD AUTO: 1.04 K/UL — HIGH (ref 0–0.9)
MONOCYTES NFR BLD AUTO: 10.1 % — SIGNIFICANT CHANGE UP (ref 2–14)
NEUTROPHILS # BLD AUTO: 7.06 K/UL — SIGNIFICANT CHANGE UP (ref 1.8–7.4)
NEUTROPHILS NFR BLD AUTO: 68.4 % — SIGNIFICANT CHANGE UP (ref 43–77)
NRBC # BLD AUTO: 0 K/UL — SIGNIFICANT CHANGE UP (ref 0–0)
NRBC # FLD: 0 K/UL — SIGNIFICANT CHANGE UP (ref 0–0)
NRBC BLD AUTO-RTO: 0 /100 WBCS — SIGNIFICANT CHANGE UP (ref 0–0)
PLATELET # BLD AUTO: 386 K/UL — SIGNIFICANT CHANGE UP (ref 150–400)
PMV BLD: 9.4 FL — SIGNIFICANT CHANGE UP (ref 7–13)
POTASSIUM SERPL-MCNC: 4.1 MMOL/L — SIGNIFICANT CHANGE UP (ref 3.5–5.3)
POTASSIUM SERPL-SCNC: 4.1 MMOL/L — SIGNIFICANT CHANGE UP (ref 3.5–5.3)
PROT SERPL-MCNC: 6.7 G/DL — SIGNIFICANT CHANGE UP (ref 6.6–8.7)
RBC # BLD: 3.1 M/UL — LOW (ref 3.8–5.2)
RBC # FLD: 18.4 % — HIGH (ref 10.3–14.5)
SODIUM SERPL-SCNC: 131 MMOL/L — LOW (ref 135–145)
WBC # BLD: 10.32 K/UL — SIGNIFICANT CHANGE UP (ref 3.8–10.5)
WBC # FLD AUTO: 10.32 K/UL — SIGNIFICANT CHANGE UP (ref 3.8–10.5)

## 2025-05-05 PROCEDURE — 76937 US GUIDE VASCULAR ACCESS: CPT | Mod: 26

## 2025-05-05 PROCEDURE — 99233 SBSQ HOSP IP/OBS HIGH 50: CPT

## 2025-05-05 PROCEDURE — 93010 ELECTROCARDIOGRAM REPORT: CPT

## 2025-05-05 PROCEDURE — 36000 PLACE NEEDLE IN VEIN: CPT

## 2025-05-05 RX ORDER — SODIUM CHLORIDE 3 G/100ML
150 INJECTION, SOLUTION INTRAVENOUS
Refills: 0 | Status: COMPLETED | OUTPATIENT
Start: 2025-05-05 | End: 2025-05-05

## 2025-05-05 RX ORDER — BUMETANIDE 1 MG/1
4 TABLET ORAL EVERY 8 HOURS
Refills: 0 | Status: DISCONTINUED | OUTPATIENT
Start: 2025-05-05 | End: 2025-05-05

## 2025-05-05 RX ORDER — BUMETANIDE 1 MG/1
4 TABLET ORAL EVERY 8 HOURS
Refills: 0 | Status: DISCONTINUED | OUTPATIENT
Start: 2025-05-05 | End: 2025-05-06

## 2025-05-05 RX ADMIN — Medication 81 MILLIGRAM(S): at 09:35

## 2025-05-05 RX ADMIN — SODIUM CHLORIDE 150 MILLILITER(S): 3 INJECTION, SOLUTION INTRAVENOUS at 14:35

## 2025-05-05 RX ADMIN — Medication 1 SPRAY(S): at 05:41

## 2025-05-05 RX ADMIN — Medication 975 MILLIGRAM(S): at 15:37

## 2025-05-05 RX ADMIN — Medication 1 LOZENGE: at 14:38

## 2025-05-05 RX ADMIN — BUMETANIDE 10 MG/HR: 1 TABLET ORAL at 05:35

## 2025-05-05 RX ADMIN — GABAPENTIN 400 MILLIGRAM(S): 400 CAPSULE ORAL at 09:35

## 2025-05-05 RX ADMIN — Medication 1300 MILLIGRAM(S): at 05:36

## 2025-05-05 RX ADMIN — DOBUTAMINE 5.48 MICROGRAM(S)/KG/MIN: 250 INJECTION INTRAVENOUS at 05:35

## 2025-05-05 RX ADMIN — Medication 3 MILLIGRAM(S): at 21:38

## 2025-05-05 RX ADMIN — INSULIN LISPRO 4: 100 INJECTION, SOLUTION INTRAVENOUS; SUBCUTANEOUS at 09:27

## 2025-05-05 RX ADMIN — Medication 1300 MILLIGRAM(S): at 14:37

## 2025-05-05 RX ADMIN — Medication 1300 MILLIGRAM(S): at 21:33

## 2025-05-05 RX ADMIN — BUMETANIDE 132 MILLIGRAM(S): 1 TABLET ORAL at 23:00

## 2025-05-05 RX ADMIN — Medication 37.5 MILLIGRAM(S): at 05:36

## 2025-05-05 RX ADMIN — TICAGRELOR 90 MILLIGRAM(S): 90 TABLET ORAL at 05:36

## 2025-05-05 RX ADMIN — METHOCARBAMOL 500 MILLIGRAM(S): 500 TABLET, FILM COATED ORAL at 17:19

## 2025-05-05 RX ADMIN — Medication 975 MILLIGRAM(S): at 21:33

## 2025-05-05 RX ADMIN — TICAGRELOR 90 MILLIGRAM(S): 90 TABLET ORAL at 17:19

## 2025-05-05 RX ADMIN — ATORVASTATIN CALCIUM 80 MILLIGRAM(S): 80 TABLET, FILM COATED ORAL at 21:33

## 2025-05-05 RX ADMIN — ERTAPENEM SODIUM 100 MILLIGRAM(S): 1 INJECTION, POWDER, LYOPHILIZED, FOR SOLUTION INTRAMUSCULAR; INTRAVENOUS at 14:35

## 2025-05-05 RX ADMIN — Medication 40 MILLIEQUIVALENT(S): at 09:35

## 2025-05-05 RX ADMIN — INSULIN GLARGINE-YFGN 5 UNIT(S): 100 INJECTION, SOLUTION SUBCUTANEOUS at 21:33

## 2025-05-05 RX ADMIN — Medication 37.5 MILLIGRAM(S): at 14:37

## 2025-05-05 RX ADMIN — INSULIN LISPRO 6: 100 INJECTION, SOLUTION INTRAVENOUS; SUBCUTANEOUS at 12:58

## 2025-05-05 RX ADMIN — ISOSORBDIE DINITRATE 20 MILLIGRAM(S): 30 TABLET ORAL at 06:30

## 2025-05-05 RX ADMIN — METHOCARBAMOL 500 MILLIGRAM(S): 500 TABLET, FILM COATED ORAL at 05:36

## 2025-05-05 RX ADMIN — ISOSORBDIE DINITRATE 20 MILLIGRAM(S): 30 TABLET ORAL at 13:46

## 2025-05-05 RX ADMIN — Medication 1 SPRAY(S): at 17:19

## 2025-05-05 RX ADMIN — DOBUTAMINE 5.48 MICROGRAM(S)/KG/MIN: 250 INJECTION INTRAVENOUS at 14:34

## 2025-05-05 RX ADMIN — INSULIN LISPRO 8: 100 INJECTION, SOLUTION INTRAVENOUS; SUBCUTANEOUS at 17:19

## 2025-05-05 RX ADMIN — BUMETANIDE 132 MILLIGRAM(S): 1 TABLET ORAL at 14:35

## 2025-05-05 RX ADMIN — Medication 975 MILLIGRAM(S): at 14:37

## 2025-05-05 RX ADMIN — Medication 975 MILLIGRAM(S): at 05:36

## 2025-05-05 RX ADMIN — ISOSORBDIE DINITRATE 20 MILLIGRAM(S): 30 TABLET ORAL at 17:19

## 2025-05-05 RX ADMIN — Medication 40 MILLIGRAM(S): at 05:36

## 2025-05-05 NOTE — PROGRESS NOTE ADULT - SUBJECTIVE AND OBJECTIVE BOX
NEPHROLOGY INTERVAL HPI/OVERNIGHT EVENTS:    Examined earlier  No distress  denies HA CP no SOB  On Dobutamine/ Bumex ggt   4300 cc UOP/ past 24 hrs      MEDICATIONS  (STANDING):  acetaminophen     Tablet .. 975 milliGRAM(s) Oral every 8 hours  aspirin  chewable 81 milliGRAM(s) Oral daily  atorvastatin 80 milliGRAM(s) Oral at bedtime  buMETAnide Infusion 2 mG/Hr (10 mL/Hr) IV Continuous <Continuous>  dextrose 5%. 1000 milliLiter(s) (100 mL/Hr) IV Continuous <Continuous>  dextrose 5%. 1000 milliLiter(s) (50 mL/Hr) IV Continuous <Continuous>  dextrose 50% Injectable 25 Gram(s) IV Push once  dextrose 50% Injectable 12.5 Gram(s) IV Push once  dextrose 50% Injectable 25 Gram(s) IV Push once  DOBUTamine Infusion 2.5 MICROgram(s)/kG/Min (5.48 mL/Hr) IV Continuous <Continuous>  ertapenem  IVPB 500 milliGRAM(s) IV Intermittent every 24 hours  gabapentin 400 milliGRAM(s) Oral daily  glucagon  Injectable 1 milliGRAM(s) IntraMuscular once  hydrALAZINE 37.5 milliGRAM(s) Oral three times a day  insulin glargine Injectable (LANTUS) 5 Unit(s) SubCutaneous at bedtime  insulin lispro (ADMELOG) corrective regimen sliding scale   SubCutaneous three times a day before meals  isosorbide   dinitrate Tablet (ISORDIL) 20 milliGRAM(s) Oral three times a day  lidocaine   4% Patch 1 Patch Transdermal every 24 hours  melatonin 3 milliGRAM(s) Oral at bedtime  methocarbamol 500 milliGRAM(s) Oral two times a day  metolazone 5 milliGRAM(s) Oral <User Schedule>  pantoprazole    Tablet 40 milliGRAM(s) Oral before breakfast  potassium chloride    Tablet ER 40 milliEquivalent(s) Oral daily  sodium bicarbonate 1300 milliGRAM(s) Oral three times a day  sodium chloride 0.65% Nasal 1 Spray(s) Both Nostrils two times a day  ticagrelor 90 milliGRAM(s) Oral every 12 hours    MEDICATIONS  (PRN):  aluminum hydroxide/magnesium hydroxide/simethicone Suspension 30 milliLiter(s) Oral every 4 hours PRN Dyspepsia  benzocaine/menthol Lozenge 1 Lozenge Oral every 2 hours PRN Sore Throat  dextrose Oral Gel 15 Gram(s) Oral once PRN Blood Glucose LESS THAN 70 milliGRAM(s)/deciliter  loperamide 2 milliGRAM(s) Oral two times a day PRN Diarrhea  ondansetron Injectable 4 milliGRAM(s) IV Push every 6 hours PRN Nausea and/or Vomiting      Allergies    No Known Allergies    Intolerances        Vital Signs Last 24 Hrs  T(C): 36.8 (05 May 2025 07:26), Max: 37.2 (05 May 2025 05:19)  T(F): 98.3 (05 May 2025 07:26), Max: 98.9 (05 May 2025 05:19)  HR: 77 (05 May 2025 07:) (70 - 93)  BP: 149/62 (05 May 2025 07:26) (108/60 - 149/62)  BP(mean): --  RR: 18 (05 May 2025 07:) (15 - 18)  SpO2: 97% (05 May 2025 07:26) (97% - 98%)    Parameters below as of 05 May 2025 07:26  Patient On (Oxygen Delivery Method): room air      Daily     Daily Weight in k.5 (05 May 2025 08:30)    PHYSICAL EXAM:  GENERAL: NAD frail  HEENT: No periorbital edema  ENMT:  Moist mucous membranes  NECK: Supple, No JVD  NERVOUS SYSTEM:  Alert & Oriented X 3  CHEST/LUNG: Diminished BS noted  HEART: Regular rate and rhythm; No rub  ABDOMEN: Soft, Nontender, +BS  EXTREMITIES: + dependent edema less    LABS:                        8.9    10.32 )-----------( 386      ( 05 May 2025 05:10 )             27.2         131[L]  |  90[L]  |  65.9[H]  ----------------------------<  151[H]  4.1   |  26.0  |  2.59[H]    Ca    8.5      05 May 2025 05:10  Mg     1.8         TPro  6.7  /  Alb  3.2[L]  /  TBili  0.5  /  DBili  x   /  AST  16  /  ALT  <5  /  AlkPhos  138[H]  05      Urinalysis Basic - ( 05 May 2025 05:10 )    Color: x / Appearance: x / SG: x / pH: x  Gluc: 151 mg/dL / Ketone: x  / Bili: x / Urobili: x   Blood: x / Protein: x / Nitrite: x   Leuk Esterase: x / RBC: x / WBC x   Sq Epi: x / Non Sq Epi: x / Bacteria: x      Magnesium: 1.8 mg/dL ( @ 05:10)          RADIOLOGY & ADDITIONAL TESTS:

## 2025-05-05 NOTE — PROCEDURE NOTE - NSUS ED ADDITIONAL DETAIL1 FT
#20 PIV placed to right forearm under ultrasound guidance. + Blood return, flush without difficulty. Patient tolerated well.

## 2025-05-05 NOTE — PROGRESS NOTE ADULT - NS ATTEND AMEND GEN_ALL_CORE FT
Ms. Ko is a 75 y/o Wol[place] speaking female with PMH of ICM/HFrEF (LVEF 35-40%), CAD s/p PCI, Mobitz II s/p PPM (MIRNA Dawn), HTN, HLD, PAD with chronic right LE wound, DM2, and CKD3, who presented to the ED on 3/19 with c/o CP, found to have NSTEMI. She underwent PCI to her RCA for ISR on 3/21/25. Over the next couple of days she developed fever, leukocytosis and worsening renal function. Her blood cultures are positive for ESBL E.coli and CT abdomen is concerning for left pyelonephritis. She was treated with antibiotics and was discharged on oral torsemide and she was instructed to check her cardiomems reading for further diuretic titration. She presented to the HF clinic on April 9th and was found to be very volume overloaded on exam with pro BNP > 58094, weight gain of 20 pounds and cardiomems PAD 32-33. She was admitted at [facility] for further management and underwent CRT-D during this admission.     TTE: 3/21/25 LVEF 25-30%, LVEDD 4.6 cm, normal RV size and function, moderate MR, mild TR, PASP 58  TTE: LVEF 20 -25%, LVEDD 4.3 cm, mild RVE with mild RV dysfunction, moderate MR and moderate TR, PASP 57, AV dyssynchrony noted.   RHC: 4/22/25: RA 18, RV 63/18, PA 63/25/37, PCW 22, PA sat 37%, Jose CO/CI 2.5/1.5, Thermo CO 2.5, SVR 2800, PVR 4.5 WILLARD. No CardioMEMS recalibration needed.      Assessment:  Acute on chronic systolic heart failure  s/p CRT-D on 4/24/2025 for AV dyssynchrony and 100% RV pacing  Severe LV systolic function  Mild RV dysfunction  Ischemic cardiomyopathy  CAD s/p recent PCI to RCA ISR in 3/25 with LAD and LCx disease  Chronic kidney disease  Mobitz type II s/p MICRGLORIA  Hypertension  Hyperlipidemia  PAD  Diabetes mellitus type 2      Plan:   Currently on dobutamine 2.5 mcg/kg/min.   We will switch the bumex drip to IV bumex 4 mg TID along with hypertonic saline to augment diuresis.   Cardiomems interrogation tomorrow.  We will add GDMT as tolerated. Currently limited by renal dysfunction.   Continue hydralazine and isordil for afterload reduction.     Further management of other UTI and other co-morbidities per primary team.

## 2025-05-05 NOTE — PROGRESS NOTE ADULT - ASSESSMENT
Gen Cards: Dr. Allen  HF: Dr. Polanco  IC: Dr. Bajwa    75 y/o female with PMH of ICM/HFrEF (LVEF 35-40%), CAD s/p PCI, Mobitz II s/p PPM (Nereyda, MDT), HTN, HLD, PAD with chronic right LE wound, DM2, and CKD3, who was BIBA from CHF clinic due to hypervolemia, weight gain of ~20 lbs in the past 9 days despite escalation of PO diuretics.      She was recently admitted on 3/19/25 with c/o CP and found to have NSTEMI. She underwent LHC on 3/21 which showed mid-distal LAD severe stenosis, OM1 and OM2 severe stenosis, mid ISR 50%, distal ISR 99%, s/p PCI with 1 ZEINAB to RCA.  On 3/22 she had a fever with leukocytosis and her blood cultures were positive for E. Coli/ESBL. Her CT A/P showed left pyelonephritis. She also had an BRADY for which her Entresto was held. She was discharged home 3/31 and her CardioMEMS was not at goal and had been rising. She was advised to increase Torsemide 40 mg daily to twice daily on 4/4. Despite this, she has continued to gain weight and reports poor UOP response.     Admission labwork: Na 125, K 4.6, BUN 37.7, Cr 1.94, eGFR 27, Mg 2.3, proBNP 15399!  CXR: increased interstitial markings bilaterally, cardiomegaly.   On 4/10 she developed a fever, tmax 100.4F. RVP negative, empirically started on IV Zosyn.  4/13 s/p right knee fluid aspiration (s/p recent trauma/fall).  4/15 initiated on continuous Bumex infusion for inadequate response to intermittent dosing.  4/21: Received 1 dose of metolazone 5 mg and Bumex gtt switched to 4 mg IV BID.  4/24: s/p CRT-D  5/5: Started on  @ 2.5 mcg/kg/min over weekend.     Prior Cardiac Testing:  RHC 4/22/25: RA 18, RV 63/18, PA 63/25/37, PCW 22, PA sat 37%, Jose CO/CI 2.5/1.5, Thermo CO 2.5, SVR 2800 dsc, PVR 4.5 WILLARD. No CardioMEMS recalibration needed.     TTE 4/21/25: LVEF 20-25%, LVIDd 4.3 cm, LVOT VTI 12.6 cm, grade 2 DD, mild RVE with mild RV dysfunction, mild-mod AS, mod MR/TR, mild AZ, est PASP 57 mmHg, est RAP 8 mmHg.    TTE 3/21/25: LVEF 25-30%, grade II DD, normal RV size/function, severe LAE, moderate MR, mild TR, PASP 58 mmHg ( RAP 8 mmHg), no evidence of LV thrombus, multiple segmental abnormalities/regional WMA.    CardioMEMS PAD (goal 14):  5/4: 25 5/2: 27-28 5/1: 24 4/30: 26 4/29: 24  4/28: 26  4/25: 24  4/22: 29-30  4/20: 31  4/18: 27  4/17: 27 (in chair ~30 degrees).  4/14: 30-33  4/11: 32  4/9: 32  3/27: 26  3/24: 23  3/21: 22

## 2025-05-05 NOTE — PROGRESS NOTE ADULT - PROBLEM SELECTOR PLAN 3
- Baseline Cr ~1.6-1.7?  - Renal function has been labile likely due to cardiorenal syndrome.   - Diuretic augmentation as above  - Renal US with arterial doppler ordered  - Avoid nephrotoxics  - Nephrology following

## 2025-05-05 NOTE — PROGRESS NOTE ADULT - ASSESSMENT
74yoF with a history of coronary artery disease, heart failure, chronic kidney disease, hypertension, diabetes, and anemia who was recently hospitalized for NSTEMI with PCI and bacteremia who presented with dyspnea on exertion and weight gain. Continues on Bumex drip as per cardiology additional diuretics with metolazone / Bumex IV given 4/21. s/p repeat heart cath 4/22 with no need for MEMS recalibration however plan for upgarde to CRT-P vs CRT-D device 4/24, s/p Cardiac MR. Diuresis resumed by HF team 4/25, continue to monitor.     1-Acute on chronic systolic heart failure  Cardio mems check today   started dobutamine and bumex iv infusion 5/3 , d/w cardiology   output is poor   d/w nephrology re dialysis , Dr matute rec to cont current tx and monitor another 24 hours   cont  metoprolol, Imdur 60 mg daily, spironolactone, Entresto full dose, hydralazine tid   schroeder inserted 5/3 to monitor intake output   repeat CXR reviewed , no effusion or edema   - s/p repeat Heart cath 4/22, no recalibration of Mems needed    2- H/o Recurrent UTI   with urinary symptoms now UA positive   urine cx ordered 5/3 not send   urine cx from 4/25 ESBL reviewed sens  cont invanz   will d/w Dr Lam ID tomorrow     3-Chronic kidney disease stage 4  - Continue to monitor while on diuresis  - Nephro on board , may need HD for fluid removal per cardio   cont to monitor C mems     4-Hypomagnesemia / hypokalemia   replaced     5-Right knee pain  - knee xray showing effusion, possible in setting of fall  - c/w current pain regimen  - CT knee result noted;   - ortho consulted, sp aspiration fluid neg for crystals/infections  - ROM improving     6-Diarrhea  resolved   - possibly abx related      7-Coronary artery disease  - Continue on aspirin, ticagrelor, and atorvastatin  - Recent cardiac catheterization with PCI, Brillinta dosage increased to 90 BID from 60 as per HF  - initial trop 140 -> 118  - c/w maalox     8-Hyponatremia   labile   improving , na 129 today   - Hypervolemic on presentation  - s/p Hypertonic saline 2% (150ml over 30mins) for hyponatremia      9-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

## 2025-05-05 NOTE — PROGRESS NOTE ADULT - PROBLEM SELECTOR PLAN 1
- ICM, most recent TTE on 4/21/25 showed further decline of LVEF to 20-25%  - Clinically appears close to euvolemia on exam w/warm extremities.  - Serum pro-BNP > 70k on 4/21. Repeat pro-BNP 43k on 4/28.  - Hyponatremia had been improving with diuresis but dropped again today.  - Her CardioMEMS PAD has not been below 25 this admission (goal previously 14). Will check tomorrow.  - Inotropes: Continue fixed dose Dobumatine 2.5 mcg/kg/min. to augment diuresis.   - Diuretics: Stop Melolazone in view of hyponatremia. Start hypertonic saline 2% (150mL over 30 minutes) twice daily. Stop continuous Bumex infusion and switch to high intermittent dosing of Bumex with 4 mg IVP TID.  - Please check BMP twice daily.  - GDMT: Continue to hold BB while on  (previous was on Coreg 6.25 mg BID). Continue Hydralazine 37.5 mg TID and Isordil 20 mg TID. Aldactone was stopped for hyperkalemia. No Farxiga 2/2 h/o pyelonephritis.   - Device: h/o Micra PPM with chronic . She is now s/p CRT-D on 4/24.  - Low Na, 1.5L FR diet recommended  - Please document strict I&Os and daily standing weight.  - Needs daily PT. Very deconditioned.

## 2025-05-05 NOTE — PROGRESS NOTE ADULT - SUBJECTIVE AND OBJECTIVE BOX
Patient is a 74y old  Female with CHF exacerbation, ARF     pt is seen in am                 ALLERGIES:  No Known Allergies    MEDICATIONS  (STANDING):  acetaminophen     Tablet .. 975 milliGRAM(s) Oral every 8 hours  aspirin  chewable 81 milliGRAM(s) Oral daily  atorvastatin 80 milliGRAM(s) Oral at bedtime  buMETAnide 4 milliGRAM(s) Oral <User Schedule>  carvedilol 6.25 milliGRAM(s) Oral every 12 hours  dextrose 5%. 1000 milliLiter(s) (100 mL/Hr) IV Continuous <Continuous>  dextrose 5%. 1000 milliLiter(s) (50 mL/Hr) IV Continuous <Continuous>  dextrose 50% Injectable 25 Gram(s) IV Push once  dextrose 50% Injectable 12.5 Gram(s) IV Push once  dextrose 50% Injectable 25 Gram(s) IV Push once  epoetin kristina-epbx (RETACRIT) Injectable 95978 Unit(s) SubCutaneous once  gabapentin 400 milliGRAM(s) Oral daily  glucagon  Injectable 1 milliGRAM(s) IntraMuscular once  hydrALAZINE 25 milliGRAM(s) Oral every 8 hours  insulin glargine Injectable (LANTUS) 5 Unit(s) SubCutaneous at bedtime  insulin lispro (ADMELOG) corrective regimen sliding scale   SubCutaneous three times a day before meals  isosorbide   mononitrate ER Tablet (IMDUR) 60 milliGRAM(s) Oral daily  lidocaine   4% Patch 1 Patch Transdermal every 24 hours  melatonin 3 milliGRAM(s) Oral at bedtime  methocarbamol 500 milliGRAM(s) Oral two times a day  metolazone 2.5 milliGRAM(s) Oral <User Schedule>  pantoprazole    Tablet 40 milliGRAM(s) Oral before breakfast  sacubitril 97 mG/valsartan 103 mG 1 Tablet(s) Oral two times a day  sodium bicarbonate 1300 milliGRAM(s) Oral three times a day  sodium chloride 0.65% Nasal 1 Spray(s) Both Nostrils two times a day  ticagrelor 90 milliGRAM(s) Oral every 12 hours    MEDICATIONS  (PRN):  aluminum hydroxide/magnesium hydroxide/simethicone Suspension 30 milliLiter(s) Oral every 4 hours PRN Dyspepsia  benzocaine/menthol Lozenge 1 Lozenge Oral every 2 hours PRN Sore Throat  dextrose Oral Gel 15 Gram(s) Oral once PRN Blood Glucose LESS THAN 70 milliGRAM(s)/deciliter  loperamide 2 milliGRAM(s) Oral two times a day PRN Diarrhea  ondansetron Injectable 4 milliGRAM(s) IV Push every 6 hours PRN Nausea and/or Vomiting    Vital Signs Last 24 Hrs  T(C): 36.8 (05 May 2025 07:26), Max: 37.2 (05 May 2025 05:19)  T(F): 98.3 (05 May 2025 07:26), Max: 98.9 (05 May 2025 05:19)  HR: 77 (05 May 2025 07:26) (70 - 93)  BP: 149/62 (05 May 2025 07:26) (108/60 - 149/62)  BP(mean): --  RR: 18 (05 May 2025 07:26) (15 - 18)  SpO2: 97% (05 May 2025 07:26) (97% - 98%)    Parameters below as of 05 May 2025 07:26  Patient On (Oxygen Delivery Method): room air      General: awake alert   Lungs: CTA bilateral   Cardio: RRR, S1/S2, No murmur  Abdomen: Soft, lwoer abd tenderness  Nondistended; Bowel sounds present  Extremities: No calf tenderness, No pitting edema   schroeder in place                             8.9    10.32 )-----------( 386      ( 05 May 2025 05:10 )             27.2   05-05    131[L]  |  90[L]  |  65.9[H]  ----------------------------<  151[H]  4.1   |  26.0  |  2.59[H]    Ca    8.5      05 May 2025 05:10  Mg     1.8     05-05    TPro  6.7  /  Alb  3.2[L]  /  TBili  0.5  /  DBili  x   /  AST  16  /  ALT  <5  /  AlkPhos  138[H]  05-05      CAPILLARY BLOOD GLUCOSE      POCT Blood Glucose.: 237 mg/dL (05 May 2025 08:28)  POCT Blood Glucose.: 223 mg/dL (04 May 2025 21:17)  POCT Blood Glucose.: 180 mg/dL (04 May 2025 16:49)  POCT Blood Glucose.: 253 mg/dL (04 May 2025 12:19)  Gluc: 169 mg/dL / Ketone: x  / Bili: x / Urobili: x   Blood: x / Protein: x / Nitrite: x

## 2025-05-05 NOTE — PROGRESS NOTE ADULT - SUBJECTIVE AND OBJECTIVE BOX
[facility] Advanced Heart Failure - Progress Note  [address] [place] [address]  Office Phone: [phone]/Fax: [phone]  Service/On Call Phone [phone]    Subjective/Objective: No acute events overnight. Patient with diminished appetite. Denies overt HF symptoms including SOB or RAMIREZ, LE edema.     Medications:  acetaminophen     Tablet .. 975 milliGRAM(s) Oral every 8 hours  aluminum hydroxide/magnesium hydroxide/simethicone Suspension 30 milliLiter(s) Oral every 4 hours PRN  aspirin  chewable 81 milliGRAM(s) Oral daily  atorvastatin 80 milliGRAM(s) Oral at bedtime  benzocaine/menthol Lozenge 1 Lozenge Oral every 2 hours PRN  buMETAnide Infusion 2 mG/Hr IV Continuous <Continuous>  dextrose 5%. 1000 milliLiter(s) IV Continuous <Continuous>  dextrose 5%. 1000 milliLiter(s) IV Continuous <Continuous>  dextrose 50% Injectable 25 Gram(s) IV Push once  dextrose 50% Injectable 12.5 Gram(s) IV Push once  dextrose 50% Injectable 25 Gram(s) IV Push once  dextrose Oral Gel 15 Gram(s) Oral once PRN  DOBUTamine Infusion 2.5 MICROgram(s)/kG/Min IV Continuous <Continuous>  ertapenem  IVPB 500 milliGRAM(s) IV Intermittent every 24 hours  gabapentin 400 milliGRAM(s) Oral daily  glucagon  Injectable 1 milliGRAM(s) IntraMuscular once  hydrALAZINE 37.5 milliGRAM(s) Oral three times a day  insulin glargine Injectable (LANTUS) 5 Unit(s) SubCutaneous at bedtime  insulin lispro (ADMELOG) corrective regimen sliding scale   SubCutaneous three times a day before meals  isosorbide   dinitrate Tablet (ISORDIL) 20 milliGRAM(s) Oral three times a day  lidocaine   4% Patch 1 Patch Transdermal every 24 hours  loperamide 2 milliGRAM(s) Oral two times a day PRN  melatonin 3 milliGRAM(s) Oral at bedtime  methocarbamol 500 milliGRAM(s) Oral two times a day  metolazone 5 milliGRAM(s) Oral <User Schedule>  ondansetron Injectable 4 milliGRAM(s) IV Push every 6 hours PRN  pantoprazole    Tablet 40 milliGRAM(s) Oral before breakfast  potassium chloride    Tablet ER 40 milliEquivalent(s) Oral daily  sodium bicarbonate 1300 milliGRAM(s) Oral three times a day  sodium chloride 0.65% Nasal 1 Spray(s) Both Nostrils two times a day  ticagrelor 90 milliGRAM(s) Oral every 12 hours    Vital Signs Last 24 Hours  T(C): 36.8 (25 @ 07:26), Max: 37.2 (25 @ 05:19)  HR: 77 (25 @ 07:26) (70 - 93)  BP: 149/62 (25 @ 07:26) (108/60 - 149/62)  RR: 18 (25 @ 07:26) (15 - 18)  SpO2: 97% (25 @ 07:26) (97% - 98%)    Weight in k.5 ( @ 08:30)    I&O's Summary  04 May 2025 07:  -  05 May 2025 07:00  --------------------------------------------------------  IN: 1657.5 mL / OUT: 4300 mL / NET: -2642.5 mL    05 May 2025 07:01  -  05 May 2025 12:57  --------------------------------------------------------  IN: 210 mL / OUT: 900 mL / NET: -690 mL    Tele: BiV paced    Physical Exam:  General: Sitting up in chair, in no distress.   HEENT: EOMs intact.  Neck: Neck supple. JVP ~8-10.  Chest: Clear to auscultation bilaterally.  CV: RRR. Normal S1 and S2. No murmurs, rub, or gallops. Warm peripherally.  PV: No LE edema noted.  Abdomen: Soft, non-distended.  Skin: warm, dry, no rash.  Neurology: Alert and oriented times three. Sensation intact.  Psych: Appropriate affect.    Labs:                        8.9    10.32 )-----------( 386      ( 05 May 2025 05:10 )             27.2         131[L]  |  90[L]  |  65.9[H]  ----------------------------<  151[H]  4.1   |  26.0  |  2.59[H]    Ca    8.5      05 May 2025 05:10  Mg     1.8         TPro  6.7  /  Alb  3.2[L]  /  TBili  0.5  /  DBili  x   /  AST  16  /  ALT  <5  /  AlkPhos  138[H]

## 2025-05-05 NOTE — PROGRESS NOTE ADULT - ASSESSMENT
75 y/o F w h/o ICM/HFrEF (LVEF 35-40%), CAD s/p PCI, Mobitz II s/p PPM (MD NereydaT), HTN, HLD, PAD with chronic right LE wound, DM2, and CKD3, who was BIBA from CHF clinic due to hypervolemia, weight gain of ~20 lbs in the past 9 days    BRADY on CKD stage III vs progression of dz likely the latter  Suspect CRS diuretic effect also contributing   Serum Cr 2.5--> 2.6 --> 2.6 --> 2.5  ICM (EF 25-30%) admitted with decompensated CHF--> improved  Pt clinically improved on current Rx  - pt to continue Bumex now ggt and Metolazone as per cardiology and accept azotemia--> good diuretic response  - cont Entresto as per cardiology  - no current need for RRT but may need it in future--> for UF removal if diuretics fail    Hyponatremia likely dilutional should improve w diuretics    HypoKalemia- better- likely 2/2 diuretics cont daily potassium supplement     Needs strict I & O s  Renally dose meds, avoid nephrotoxic agents  Monitor labs will follow

## 2025-05-06 LAB
-  AMPICILLIN/SULBACTAM: SIGNIFICANT CHANGE UP
-  AMPICILLIN: SIGNIFICANT CHANGE UP
-  AZTREONAM: SIGNIFICANT CHANGE UP
-  CEFAZOLIN: SIGNIFICANT CHANGE UP
-  CEFEPIME: SIGNIFICANT CHANGE UP
-  CEFTRIAXONE: SIGNIFICANT CHANGE UP
-  CEFUROXIME: SIGNIFICANT CHANGE UP
-  CIPROFLOXACIN: SIGNIFICANT CHANGE UP
-  ERTAPENEM: SIGNIFICANT CHANGE UP
-  GENTAMICIN: SIGNIFICANT CHANGE UP
-  IMIPENEM: SIGNIFICANT CHANGE UP
-  LEVOFLOXACIN: SIGNIFICANT CHANGE UP
-  MEROPENEM: SIGNIFICANT CHANGE UP
-  NITROFURANTOIN: SIGNIFICANT CHANGE UP
-  PIPERACILLIN/TAZOBACTAM: SIGNIFICANT CHANGE UP
-  TIGECYCLINE: SIGNIFICANT CHANGE UP
-  TOBRAMYCIN: SIGNIFICANT CHANGE UP
-  TRIMETHOPRIM/SULFAMETHOXAZOLE: SIGNIFICANT CHANGE UP
ANION GAP SERPL CALC-SCNC: 17 MMOL/L — SIGNIFICANT CHANGE UP (ref 5–17)
BUN SERPL-MCNC: 62.8 MG/DL — HIGH (ref 8–20)
CALCIUM SERPL-MCNC: 8.4 MG/DL — SIGNIFICANT CHANGE UP (ref 8.4–10.5)
CHLORIDE SERPL-SCNC: 89 MMOL/L — LOW (ref 96–108)
CO2 SERPL-SCNC: 25 MMOL/L — SIGNIFICANT CHANGE UP (ref 22–29)
CREAT SERPL-MCNC: 2.75 MG/DL — HIGH (ref 0.5–1.3)
EGFR: 18 ML/MIN/1.73M2 — LOW
EGFR: 18 ML/MIN/1.73M2 — LOW
GLUCOSE BLDC GLUCOMTR-MCNC: 182 MG/DL — HIGH (ref 70–99)
GLUCOSE BLDC GLUCOMTR-MCNC: 230 MG/DL — HIGH (ref 70–99)
GLUCOSE BLDC GLUCOMTR-MCNC: 232 MG/DL — HIGH (ref 70–99)
GLUCOSE BLDC GLUCOMTR-MCNC: 246 MG/DL — HIGH (ref 70–99)
GLUCOSE SERPL-MCNC: 186 MG/DL — HIGH (ref 70–99)
METHOD TYPE: SIGNIFICANT CHANGE UP
POTASSIUM SERPL-MCNC: 4.4 MMOL/L — SIGNIFICANT CHANGE UP (ref 3.5–5.3)
POTASSIUM SERPL-SCNC: 4.4 MMOL/L — SIGNIFICANT CHANGE UP (ref 3.5–5.3)
SODIUM SERPL-SCNC: 131 MMOL/L — LOW (ref 135–145)

## 2025-05-06 PROCEDURE — 99232 SBSQ HOSP IP/OBS MODERATE 35: CPT

## 2025-05-06 PROCEDURE — 93975 VASCULAR STUDY: CPT | Mod: 26

## 2025-05-06 RX ORDER — BUMETANIDE 1 MG/1
4 TABLET ORAL
Refills: 0 | Status: DISCONTINUED | OUTPATIENT
Start: 2025-05-06 | End: 2025-05-07

## 2025-05-06 RX ADMIN — METHOCARBAMOL 500 MILLIGRAM(S): 500 TABLET, FILM COATED ORAL at 16:59

## 2025-05-06 RX ADMIN — INSULIN LISPRO 2: 100 INJECTION, SOLUTION INTRAVENOUS; SUBCUTANEOUS at 12:31

## 2025-05-06 RX ADMIN — INSULIN LISPRO 4: 100 INJECTION, SOLUTION INTRAVENOUS; SUBCUTANEOUS at 08:45

## 2025-05-06 RX ADMIN — Medication 3 MILLIGRAM(S): at 21:49

## 2025-05-06 RX ADMIN — INSULIN GLARGINE-YFGN 5 UNIT(S): 100 INJECTION, SOLUTION SUBCUTANEOUS at 21:46

## 2025-05-06 RX ADMIN — ISOSORBDIE DINITRATE 20 MILLIGRAM(S): 30 TABLET ORAL at 15:33

## 2025-05-06 RX ADMIN — Medication 37.5 MILLIGRAM(S): at 06:11

## 2025-05-06 RX ADMIN — ERTAPENEM SODIUM 100 MILLIGRAM(S): 1 INJECTION, POWDER, LYOPHILIZED, FOR SOLUTION INTRAMUSCULAR; INTRAVENOUS at 16:58

## 2025-05-06 RX ADMIN — Medication 975 MILLIGRAM(S): at 21:46

## 2025-05-06 RX ADMIN — ISOSORBDIE DINITRATE 20 MILLIGRAM(S): 30 TABLET ORAL at 12:29

## 2025-05-06 RX ADMIN — TICAGRELOR 90 MILLIGRAM(S): 90 TABLET ORAL at 16:59

## 2025-05-06 RX ADMIN — Medication 975 MILLIGRAM(S): at 06:12

## 2025-05-06 RX ADMIN — Medication 1300 MILLIGRAM(S): at 06:12

## 2025-05-06 RX ADMIN — LIDOCAINE HYDROCHLORIDE 1 PATCH: 20 JELLY TOPICAL at 12:29

## 2025-05-06 RX ADMIN — Medication 1 SPRAY(S): at 17:03

## 2025-05-06 RX ADMIN — ATORVASTATIN CALCIUM 80 MILLIGRAM(S): 80 TABLET, FILM COATED ORAL at 21:46

## 2025-05-06 RX ADMIN — Medication 81 MILLIGRAM(S): at 12:30

## 2025-05-06 RX ADMIN — ISOSORBDIE DINITRATE 20 MILLIGRAM(S): 30 TABLET ORAL at 06:11

## 2025-05-06 RX ADMIN — INSULIN LISPRO 4: 100 INJECTION, SOLUTION INTRAVENOUS; SUBCUTANEOUS at 17:52

## 2025-05-06 RX ADMIN — Medication 975 MILLIGRAM(S): at 15:32

## 2025-05-06 RX ADMIN — BUMETANIDE 4 MILLIGRAM(S): 1 TABLET ORAL at 16:58

## 2025-05-06 RX ADMIN — BUMETANIDE 132 MILLIGRAM(S): 1 TABLET ORAL at 06:12

## 2025-05-06 RX ADMIN — METHOCARBAMOL 500 MILLIGRAM(S): 500 TABLET, FILM COATED ORAL at 06:12

## 2025-05-06 RX ADMIN — GABAPENTIN 400 MILLIGRAM(S): 400 CAPSULE ORAL at 12:30

## 2025-05-06 RX ADMIN — Medication 40 MILLIGRAM(S): at 06:12

## 2025-05-06 RX ADMIN — TICAGRELOR 90 MILLIGRAM(S): 90 TABLET ORAL at 06:11

## 2025-05-06 RX ADMIN — Medication 40 MILLIEQUIVALENT(S): at 12:30

## 2025-05-06 RX ADMIN — LIDOCAINE HYDROCHLORIDE 1 PATCH: 20 JELLY TOPICAL at 19:30

## 2025-05-06 RX ADMIN — Medication 975 MILLIGRAM(S): at 16:30

## 2025-05-06 RX ADMIN — Medication 1300 MILLIGRAM(S): at 21:47

## 2025-05-06 RX ADMIN — Medication 1300 MILLIGRAM(S): at 15:32

## 2025-05-06 RX ADMIN — Medication 1 SPRAY(S): at 06:12

## 2025-05-06 RX ADMIN — Medication 37.5 MILLIGRAM(S): at 15:32

## 2025-05-06 NOTE — PROGRESS NOTE ADULT - ASSESSMENT
Gen Cards: Dr. Allen  HF: Dr. Polanco  IC: Dr. Bajwa    75 y/o female with PMH of ICM/HFrEF (LVEF 35-40%), CAD s/p PCI, Mobitz II s/p PPM (MD NereydaT), HTN, HLD, PAD with chronic right LE wound, DM2, and CKD3, who was BIBA from CHF clinic due to hypervolemia, weight gain of ~20 lbs in the past 9 days despite escalation of PO diuretics.      She was recently admitted on 3/19/25 with c/o CP and found to have NSTEMI. She underwent LHC on 3/21 which showed mid-distal LAD severe stenosis, OM1 and OM2 severe stenosis, mid ISR 50%, distal ISR 99%, s/p PCI with 1 ZEINAB to RCA.  On 3/22 she had a fever with leukocytosis and her blood cultures were positive for E. Coli/ESBL. Her CT A/P showed left pyelonephritis. She also had an BRADY for which her Entresto was held. She was discharged home 3/31 and her CardioMEMS was not at goal and had been rising. She was advised to increase Torsemide 40 mg daily to twice daily on 4/4. Despite this, she has continued to gain weight and reports poor UOP response.     Admission labwork: Na 125, K 4.6, BUN 37.7, Cr 1.94, eGFR 27, Mg 2.3, proBNP 01741!  CXR: increased interstitial markings bilaterally, cardiomegaly.   On 4/10 she developed a fever, tmax 100.4F. RVP negative, empirically started on IV Zosyn.  4/13 s/p right knee fluid aspiration (s/p recent trauma/fall).  4/15 initiated on continuous Bumex infusion for inadequate response to intermittent dosing.  4/21: Received 1 dose of metolazone 5 mg and Bumex gtt switched to 4 mg IV BID.  4/24: s/p CRT-D  5/5: Started on  @ 2.5 mcg/kg/min over weekend.   5/6: No significant improvement in renal function w/  inotrope assisted diuresis. DC . Switch to oral Bumex w/ close monitoring of renal fx.     Prior Cardiac Testing:  RHC 4/22/25: RA 18, RV 63/18, PA 63/25/37, PCW 22, PA sat 37%, Jose CO/CI 2.5/1.5, Thermo CO 2.5, SVR 2800 dsc, PVR 4.5 WILLARD. No CardioMEMS recalibration needed.     TTE 4/21/25: LVEF 20-25%, LVIDd 4.3 cm, LVOT VTI 12.6 cm, grade 2 DD, mild RVE with mild RV dysfunction, mild-mod AS, mod MR/TR, mild WA, est PASP 57 mmHg, est RAP 8 mmHg.    TTE 3/21/25: LVEF 25-30%, grade II DD, normal RV size/function, severe LAE, moderate MR, mild TR, PASP 58 mmHg ( RAP 8 mmHg), no evidence of LV thrombus, multiple segmental abnormalities/regional WMA.    CardioMEMS PAD (goal 14):  5/4: 25 5/2: 27-28  5/1: 24  4/30: 26  4/29: 24  4/28: 26  4/25: 24  4/22: 29-30  4/20: 31  4/18: 27  4/17: 27 (in chair ~30 degrees).  4/14: 30-33  4/11: 32  4/9: 32  3/27: 26  3/24: 23  3/21: 22 Gen Cards: Dr. Allen  HF: Dr. Polanco  IC: Dr. Bajwa    73 y/o female with PMH of ICM/HFrEF (LVEF 35-40%), CAD s/p PCI, Mobitz II s/p PPM (MD NereydaT), HTN, HLD, PAD with chronic right LE wound, DM2, and CKD3, who was BIBA from CHF clinic due to hypervolemia, weight gain of ~20 lbs in the past 9 days despite escalation of PO diuretics.      She was recently admitted on 3/19/25 with c/o CP and found to have NSTEMI. She underwent LHC on 3/21 which showed mid-distal LAD severe stenosis, OM1 and OM2 severe stenosis, mid ISR 50%, distal ISR 99%, s/p PCI with 1 ZEINAB to RCA.  On 3/22 she had a fever with leukocytosis and her blood cultures were positive for E. Coli/ESBL. Her CT A/P showed left pyelonephritis. She also had an BRADY for which her Entresto was held. She was discharged home 3/31 and her CardioMEMS was not at goal and had been rising. She was advised to increase Torsemide 40 mg daily to twice daily on 4/4. Despite this, she has continued to gain weight and reports poor UOP response.     Admission labwork: Na 125, K 4.6, BUN 37.7, Cr 1.94, eGFR 27, Mg 2.3, proBNP 38932!  CXR: increased interstitial markings bilaterally, cardiomegaly.   On 4/10 she developed a fever, tmax 100.4F. RVP negative, empirically started on IV Zosyn.  4/13 s/p right knee fluid aspiration (s/p recent trauma/fall).  4/15 initiated on continuous Bumex infusion for inadequate response to intermittent dosing.  4/21: Received 1 dose of metolazone 5 mg and Bumex gtt switched to 4 mg IV BID.  4/24: s/p CRT-D  5/5: Started on  @ 2.5 mcg/kg/min over weekend.   5/6: No significant improvement in renal function w/  inotrope assisted diuresis. DC . Switch to oral Bumex w/ close monitoring of renal fx.     Prior Cardiac Testing:  RHC 4/22/25: RA 18, RV 63/18, PA 63/25/37, PCW 22, PA sat 37%, Jose CO/CI 2.5/1.5, Thermo CO 2.5, SVR 2800 dsc, PVR 4.5 WILLARD. No CardioMEMS recalibration needed.     TTE 4/21/25: LVEF 20-25%, LVIDd 4.3 cm, LVOT VTI 12.6 cm, grade 2 DD, mild RVE with mild RV dysfunction, mild-mod AS, mod MR/TR, mild SC, est PASP 57 mmHg, est RAP 8 mmHg.    TTE 3/21/25: LVEF 25-30%, grade II DD, normal RV size/function, severe LAE, moderate MR, mild TR, PASP 58 mmHg ( RAP 8 mmHg), no evidence of LV thrombus, multiple segmental abnormalities/regional WMA.    CardioMEMS PAD (goal 14):  5/6: 22-23  5/4: 25  5/2: 27-28 5/1: 24  4/30: 26 4/29: 24  4/28: 26  4/25: 24  4/22: 29-30  4/20: 31  4/18: 27  4/17: 27 (in chair ~30 degrees).  4/14: 30-33  4/11: 32  4/9: 32  3/27: 26  3/24: 23  3/21: 22

## 2025-05-06 NOTE — PROGRESS NOTE ADULT - ASSESSMENT
73 y/o F w h/o ICM/HFrEF (LVEF 35-40%), CAD s/p PCI, Mobitz II s/p PPM (MD NereydaT), HTN, HLD, PAD with chronic right LE wound, DM2, and CKD3, who was BIBA from CHF clinic due to hypervolemia, weight gain of ~20 lbs in the past 9 days    BRADY on CKD stage III vs progression of dz likely the latter  Suspect CRS diuretic effect also contributing   Serum Cr 2.5--> 2.6 --> 2.6 --> 2.5 --> 2.7  ICM (EF 25-30%) admitted with decompensated CHF--> improved  Pt clinically improved on current Rx  - s/p Bumex ggt now on po Bumex 4 mg Q day as per cardiology and accept azotemia--> good diuretic response thus far  - cont Entresto as per cardiology  - no current need for RRT but may need it in future--> for UF removal if diuretics fail    Hyponatremia likely dilutional should improve w diuretics    HypoKalemia- better- likely 2/2 diuretics cont daily potassium supplement     Needs strict I & O s  Renally dose meds, avoid nephrotoxic agents  Monitor labs will follow

## 2025-05-06 NOTE — PROGRESS NOTE ADULT - SUBJECTIVE AND OBJECTIVE BOX
[facility] Advanced Heart Failure - Progress Note  [address] [place] [address]  Office Phone: [phone]/Fax: [phone]  Service/On Call Phone [phone]    Subjective/Objective: No acute events overnight. Patient denies overt HF symptoms. She has been oob and ambulating without SOB.    Medications:  acetaminophen     Tablet .. 975 milliGRAM(s) Oral every 8 hours  aluminum hydroxide/magnesium hydroxide/simethicone Suspension 30 milliLiter(s) Oral every 4 hours PRN  aspirin  chewable 81 milliGRAM(s) Oral daily  atorvastatin 80 milliGRAM(s) Oral at bedtime  benzocaine/menthol Lozenge 1 Lozenge Oral every 2 hours PRN  buMETAnide 4 milliGRAM(s) Oral <User Schedule>  dextrose 5%. 1000 milliLiter(s) IV Continuous <Continuous>  dextrose 5%. 1000 milliLiter(s) IV Continuous <Continuous>  dextrose 50% Injectable 25 Gram(s) IV Push once  dextrose 50% Injectable 12.5 Gram(s) IV Push once  dextrose 50% Injectable 25 Gram(s) IV Push once  dextrose Oral Gel 15 Gram(s) Oral once PRN  ertapenem  IVPB 500 milliGRAM(s) IV Intermittent every 24 hours  gabapentin 400 milliGRAM(s) Oral daily  glucagon  Injectable 1 milliGRAM(s) IntraMuscular once  hydrALAZINE 37.5 milliGRAM(s) Oral three times a day  insulin glargine Injectable (LANTUS) 5 Unit(s) SubCutaneous at bedtime  insulin lispro (ADMELOG) corrective regimen sliding scale   SubCutaneous three times a day before meals  isosorbide   dinitrate Tablet (ISORDIL) 20 milliGRAM(s) Oral three times a day  lidocaine   4% Patch 1 Patch Transdermal every 24 hours  loperamide 2 milliGRAM(s) Oral two times a day PRN  melatonin 3 milliGRAM(s) Oral at bedtime  methocarbamol 500 milliGRAM(s) Oral two times a day  ondansetron Injectable 4 milliGRAM(s) IV Push every 6 hours PRN  pantoprazole    Tablet 40 milliGRAM(s) Oral before breakfast  potassium chloride    Tablet ER 40 milliEquivalent(s) Oral daily  sodium bicarbonate 1300 milliGRAM(s) Oral three times a day  sodium chloride 0.65% Nasal 1 Spray(s) Both Nostrils two times a day  ticagrelor 90 milliGRAM(s) Oral every 12 hours    Vital Signs Last 24 Hours  T(C): 36.9 (25 @ 07:28), Max: 37.1 (25 @ 20:45)  HR: 86 (25 @ 07:28) (71 - 86)  BP: 96/60 (25 @ 07:28) (96/60 - 166/66)  RR: 18 (25 @ 07:28) (16 - 18)  SpO2: 96% (25 @ 07:28) (95% - 100%)    Weight in k.6 ( @ 08:42)    I&O's Summary  05 May 2025 07:01  -  06 May 2025 07:00  --------------------------------------------------------  IN: 1039 mL / OUT: 3275 mL / NET: -2236 mL    Tele: BiV paced    Physical Exam:  General: Sitting up in chair in no distress.   HEENT: EOMs intact.  Neck: Neck supple. JVP not elevated.   Chest: Clear to auscultation bilaterally.  CV: RRR. Normal S1 and S2. No murmurs, rub, or gallops. Warm peripherally.  PV: No LE edema noted.   Abdomen: Soft, non-distended, non-tender.  Skin: warm, dry.  Neurology: Alert and oriented times three. Sensation intact.  Psych: Appropriate affect.    Labs:                        8.9    10.32 )-----------( 386      ( 05 May 2025 05:10 )             27.2         131[L]  |  89[L]  |  62.8[H]  ----------------------------<  186[H]  4.4   |  25.0  |  2.75[H]    Ca    8.4      06 May 2025 05:55  Mg     1.8     05-    TPro  6.7  /  Alb  3.2[L]  /  TBili  0.5  /  DBili  x   /  AST  16  /  ALT  <5  /  AlkPhos  138[H]  -

## 2025-05-06 NOTE — PROGRESS NOTE ADULT - PROBLEM SELECTOR PLAN 1
- ICM, most recent TTE on 4/21/25 showed further decline of LVEF to 20-25%  - Serum pro-BNP > 70k on 4/21. Repeat pro-BNP 43k on 4/28.  - Her CardioMEMS PAD has not been below 25 this admission (goal previously 14) although patient is symptomatically stable. Will have to accept higher goal.  - Clinically appears close to euvolemia on exam w/warm extremities.  - Inotropes: Stop dobutamine, unclear benefit.   - Diuretics: Switch Bumex to 4 mg PO TID.  - GDMT: Continue Hydralazine 37.5 mg TID and Isordil 20 mg TID. Aldactone was stopped for hyperkalemia. No Farxiga 2/2 h/o pyelonephritis.   - Device: h/o Micra PPM with chronic . She is now s/p CRT-D on 4/24.  - Low Na, 1.5L FR diet recommended  - Please document strict I&Os and daily standing weight.  - Needs daily PT. - ICM, most recent TTE on 4/21/25 showed further decline of LVEF to 20-25%  - Serum pro-BNP > 70k on 4/21. Repeat pro-BNP 43k on 4/28.  - Her CardioMEMS PAD goal was thought to have been ~15. Today her PAD is 22-23 today which is the lowest so far on this admission. Patient is symptomatically stable. Will accept higher goal closer to 25.  - Clinically appears euvolemic on exam w/warm extremities.  - Inotropes: Stop dobutamine, unclear benefit.   - Diuretics: Switch Bumex to 4 mg PO TID.  - GDMT: Continue Hydralazine 37.5 mg TID and Isordil 20 mg TID. Aldactone was stopped for hyperkalemia. No Farxiga 2/2 h/o pyelonephritis.   - Device: h/o Micra PPM with chronic . She is now s/p CRT-D on 4/24.  - Low Na, 1.5L FR diet recommended  - Please document strict I&Os and daily standing weight.  - Needs daily PT.

## 2025-05-06 NOTE — PROGRESS NOTE ADULT - SUBJECTIVE AND OBJECTIVE BOX
Patient is a 74y old  Female with CHF exacerbation, ARF     pt is seen in am   she is feeling well , better ambulates no SOB   no abd pain   son is at the bedside translating                   ALLERGIES:  No Known Allergies    MEDICATIONS  (STANDING):  acetaminophen     Tablet .. 975 milliGRAM(s) Oral every 8 hours  aspirin  chewable 81 milliGRAM(s) Oral daily  atorvastatin 80 milliGRAM(s) Oral at bedtime  buMETAnide 4 milliGRAM(s) Oral <User Schedule>  carvedilol 6.25 milliGRAM(s) Oral every 12 hours  dextrose 5%. 1000 milliLiter(s) (100 mL/Hr) IV Continuous <Continuous>  dextrose 5%. 1000 milliLiter(s) (50 mL/Hr) IV Continuous <Continuous>  dextrose 50% Injectable 25 Gram(s) IV Push once  dextrose 50% Injectable 12.5 Gram(s) IV Push once  dextrose 50% Injectable 25 Gram(s) IV Push once  epoetin kristina-epbx (RETACRIT) Injectable 33049 Unit(s) SubCutaneous once  gabapentin 400 milliGRAM(s) Oral daily  glucagon  Injectable 1 milliGRAM(s) IntraMuscular once  hydrALAZINE 25 milliGRAM(s) Oral every 8 hours  insulin glargine Injectable (LANTUS) 5 Unit(s) SubCutaneous at bedtime  insulin lispro (ADMELOG) corrective regimen sliding scale   SubCutaneous three times a day before meals  isosorbide   mononitrate ER Tablet (IMDUR) 60 milliGRAM(s) Oral daily  lidocaine   4% Patch 1 Patch Transdermal every 24 hours  melatonin 3 milliGRAM(s) Oral at bedtime  methocarbamol 500 milliGRAM(s) Oral two times a day  metolazone 2.5 milliGRAM(s) Oral <User Schedule>  pantoprazole    Tablet 40 milliGRAM(s) Oral before breakfast  sacubitril 97 mG/valsartan 103 mG 1 Tablet(s) Oral two times a day  sodium bicarbonate 1300 milliGRAM(s) Oral three times a day  sodium chloride 0.65% Nasal 1 Spray(s) Both Nostrils two times a day  ticagrelor 90 milliGRAM(s) Oral every 12 hours    MEDICATIONS  (PRN):  aluminum hydroxide/magnesium hydroxide/simethicone Suspension 30 milliLiter(s) Oral every 4 hours PRN Dyspepsia  benzocaine/menthol Lozenge 1 Lozenge Oral every 2 hours PRN Sore Throat  dextrose Oral Gel 15 Gram(s) Oral once PRN Blood Glucose LESS THAN 70 milliGRAM(s)/deciliter  loperamide 2 milliGRAM(s) Oral two times a day PRN Diarrhea  ondansetron Injectable 4 milliGRAM(s) IV Push every 6 hours PRN Nausea and/or Vomiting      Vital Signs Last 24 Hrs  T(C): 36.7 (06 May 2025 12:31), Max: 37.1 (05 May 2025 20:45)  T(F): 98 (06 May 2025 12:31), Max: 98.7 (05 May 2025 20:45)  HR: 75 (06 May 2025 12:31) (71 - 86)  BP: 102/59 (06 May 2025 12:31) (96/60 - 166/66)  BP(mean): 74 (06 May 2025 12:31) (74 - 74)  RR: 18 (06 May 2025 12:31) (16 - 18)  SpO2: 96% (06 May 2025 12:31) (95% - 100%)    Parameters below as of 06 May 2025 12:31  Patient On (Oxygen Delivery Method): room air        General: awake alert   Lungs: CTA bilateral   Cardio: RRR, S1/S2, No murmur  Abdomen: Soft, lwoer abd tenderness  Nondistended; Bowel sounds present  Extremities: No calf tenderness, No pitting edema   schroeder in place               CAPILLARY BLOOD GLUCOSE      POCT Blood Glucose.: 182 mg/dL (06 May 2025 12:08)  POCT Blood Glucose.: 232 mg/dL (06 May 2025 08:20)  POCT Blood Glucose.: 166 mg/dL (05 May 2025 20:58)  POCT Blood Glucose.: 310 mg/dL (05 May 2025 17:16)                            8.9    10.32 )-----------( 386      ( 05 May 2025 05:10 )             27.2         05-06    131[L]  |  89[L]  |  62.8[H]  ----------------------------<  186[H]  4.4   |  25.0  |  2.75[H]    Ca    8.4      06 May 2025 05:55  Mg     1.8     05-05    TPro  6.7  /  Alb  3.2[L]  /  TBili  0.5  /  DBili  x   /  AST  16  /  ALT  <5  /  AlkPhos  138[H]  05-05

## 2025-05-06 NOTE — PROVIDER CONTACT NOTE (MEDICATION) - SITUATION
Pt. scheduled for two medications that lowers BP (hydralazine 37.5mg, bumex 4mg) with no parameters.

## 2025-05-06 NOTE — PROGRESS NOTE ADULT - PROBLEM SELECTOR PLAN 3
- Baseline Cr ~1.6-1.7?  - Renal function has been labile likely due to cardiorenal syndrome   - Diuretic augmentation as above  - Renal US with arterial doppler ordered  - Avoid nephrotoxics  - Nephrology following

## 2025-05-06 NOTE — PROGRESS NOTE ADULT - NS ATTEND AMEND GEN_ALL_CORE FT
Ms. Ko is a 75 y/o Khmer speaking female with PMH of ICM/HFrEF (LVEF 35-40%), CAD s/p PCI, Mobitz II s/p PPM (MIRNA Dawn), HTN, HLD, PAD with chronic right LE wound, DM2, and CKD3, who presented to the ED on 3/19 with c/o CP, found to have NSTEMI. She underwent PCI to her RCA for ISR on 3/21/25. Over the next couple of days she developed fever, leukocytosis and worsening renal function. Her blood cultures are positive for ESBL E.coli and CT abdomen is concerning for left pyelonephritis. She was treated with antibiotics and was discharged on oral torsemide and she was instructed to check her cardiomems reading for further diuretic titration. She presented to the HF clinic on April 9th and was found to be very volume overloaded on exam with pro BNP > 99108, weight gain of 20 pounds and cardiomems PAD 32-33. She was admitted at [facility] for further management and underwent CRT-D during this admission.     TTE: 3/21/25 LVEF 25-30%, LVEDD 4.6 cm, normal RV size and function, moderate MR, mild TR, PASP 58  TTE: LVEF 20 -25%, LVEDD 4.3 cm, mild RVE with mild RV dysfunction, moderate MR and moderate TR, PASP 57, AV dyssynchrony noted.   RHC: 4/22/25: RA 18, RV 63/18, PA 63/25/37, PCW 22, PA sat 37%, Jose CO/CI 2.5/1.5, Thermo CO 2.5, SVR 2800, PVR 4.5 WILLARD. No CardioMEMS recalibration needed.      Assessment:  Acute on chronic systolic heart failure  s/p CRT-D on 4/24/2025 for AV dyssynchrony and 100% RV pacing  Severe LV systolic function  Mild RV dysfunction  Ischemic cardiomyopathy  CAD s/p recent PCI to RCA ISR in 3/25 with LAD and LCx disease  Chronic kidney disease  Mobitz type II s/p MICRGLORIA  Hypertension  Hyperlipidemia  PAD  Diabetes mellitus type 2      Plan:   Stop dobutamine  Switch bumex to 4 mg oral TID.   Cardiomems PAD around 22 today.  We will add GDMT as tolerated. Currently limited by renal dysfunction.   Continue hydralazine and isordil for afterload reduction.     Further management of other UTI and other co-morbidities per primary team.

## 2025-05-06 NOTE — PROGRESS NOTE ADULT - SUBJECTIVE AND OBJECTIVE BOX
NEPHROLOGY INTERVAL HPI/OVERNIGHT EVENTS:    Examined earlier  No distress  denies HA CP no SOB  Has good UOP    MEDICATIONS  (STANDING):  acetaminophen     Tablet .. 975 milliGRAM(s) Oral every 8 hours  aspirin  chewable 81 milliGRAM(s) Oral daily  atorvastatin 80 milliGRAM(s) Oral at bedtime  buMETAnide 4 milliGRAM(s) Oral <User Schedule>  dextrose 5%. 1000 milliLiter(s) (100 mL/Hr) IV Continuous <Continuous>  dextrose 5%. 1000 milliLiter(s) (50 mL/Hr) IV Continuous <Continuous>  dextrose 50% Injectable 25 Gram(s) IV Push once  dextrose 50% Injectable 12.5 Gram(s) IV Push once  dextrose 50% Injectable 25 Gram(s) IV Push once  ertapenem  IVPB 500 milliGRAM(s) IV Intermittent every 24 hours  gabapentin 400 milliGRAM(s) Oral daily  glucagon  Injectable 1 milliGRAM(s) IntraMuscular once  hydrALAZINE 37.5 milliGRAM(s) Oral three times a day  insulin glargine Injectable (LANTUS) 5 Unit(s) SubCutaneous at bedtime  insulin lispro (ADMELOG) corrective regimen sliding scale   SubCutaneous three times a day before meals  isosorbide   dinitrate Tablet (ISORDIL) 20 milliGRAM(s) Oral three times a day  lidocaine   4% Patch 1 Patch Transdermal every 24 hours  melatonin 3 milliGRAM(s) Oral at bedtime  methocarbamol 500 milliGRAM(s) Oral two times a day  pantoprazole    Tablet 40 milliGRAM(s) Oral before breakfast  potassium chloride    Tablet ER 40 milliEquivalent(s) Oral daily  sodium bicarbonate 1300 milliGRAM(s) Oral three times a day  sodium chloride 0.65% Nasal 1 Spray(s) Both Nostrils two times a day  ticagrelor 90 milliGRAM(s) Oral every 12 hours    MEDICATIONS  (PRN):  aluminum hydroxide/magnesium hydroxide/simethicone Suspension 30 milliLiter(s) Oral every 4 hours PRN Dyspepsia  benzocaine/menthol Lozenge 1 Lozenge Oral every 2 hours PRN Sore Throat  dextrose Oral Gel 15 Gram(s) Oral once PRN Blood Glucose LESS THAN 70 milliGRAM(s)/deciliter  loperamide 2 milliGRAM(s) Oral two times a day PRN Diarrhea  ondansetron Injectable 4 milliGRAM(s) IV Push every 6 hours PRN Nausea and/or Vomiting      Allergies    No Known Allergies    Intolerances        Vital Signs Last 24 Hrs  T(C): 36.7 (06 May 2025 12:31), Max: 37.1 (05 May 2025 20:45)  T(F): 98 (06 May 2025 12:31), Max: 98.7 (05 May 2025 20:45)  HR: 75 (06 May 2025 12:31) (71 - 86)  BP: 102/59 (06 May 2025 12:31) (96/60 - 166/66)  BP(mean): 74 (06 May 2025 12:31) (74 - 74)  RR: 18 (06 May 2025 12:31) (16 - 18)  SpO2: 96% (06 May 2025 12:31) (95% - 100%)    Parameters below as of 06 May 2025 12:31  Patient On (Oxygen Delivery Method): room air      Daily     Daily Weight in k.6 (06 May 2025 08:42)    PHYSICAL EXAM:  GENERAL: NAD frail  HEENT: No periorbital edema  ENMT:  Moist mucous membranes  NECK: Supple, No JVD  NERVOUS SYSTEM:  Alert & Oriented X 3  CHEST/LUNG: Diminished BS noted  HEART: Regular rate and rhythm; No rub  ABDOMEN: Soft, Nontender, +BS  EXTREMITIES: + dependent edema less    LABS:                        8.9    10.32 )-----------( 386      ( 05 May 2025 05:10 )             27.2     05-06    131[L]  |  89[L]  |  62.8[H]  ----------------------------<  186[H]  4.4   |  25.0  |  2.75[H]    Ca    8.4      06 May 2025 05:55  Mg     1.8     05-05    TPro  6.7  /  Alb  3.2[L]  /  TBili  0.5  /  DBili  x   /  AST  16  /  ALT  <5  /  AlkPhos  138[H]  05-05      Urinalysis Basic - ( 06 May 2025 05:55 )    Color: x / Appearance: x / SG: x / pH: x  Gluc: 186 mg/dL / Ketone: x  / Bili: x / Urobili: x   Blood: x / Protein: x / Nitrite: x   Leuk Esterase: x / RBC: x / WBC x   Sq Epi: x / Non Sq Epi: x / Bacteria: x              RADIOLOGY & ADDITIONAL TESTS:

## 2025-05-06 NOTE — PROVIDER CONTACT NOTE (MEDICATION) - ACTION/TREATMENT ORDERED:
HOLD scheduled hydralazine and bumex.
Blood sugar recheck at 2255, it was 112mg/dl. Notified ALEXIS Bruno. She stated not to give the 15units lantus, she put new order in. 5 units of lantus insulin given per order. No acute changes noted, will continue to monitor.

## 2025-05-06 NOTE — PROGRESS NOTE ADULT - ASSESSMENT
74yoF with a history of coronary artery disease, heart failure, chronic kidney disease, hypertension, diabetes, and anemia who was recently hospitalized for NSTEMI with PCI and bacteremia who presented with dyspnea on exertion and weight gain. Continues on Bumex drip as per cardiology additional diuretics with metolazone / Bumex IV given 4/21. s/p repeat heart cath 4/22 with no need for MEMS recalibration however plan for upgarde to CRT-P vs CRT-D device 4/24, s/p Cardiac MR. Diuresis resumed by HF team 4/25, continue to monitor.     1-Acute on chronic systolic heart failure  HF team input appreciated   cont strict I and OS has schroeder   on dobutamine and bumex iv infusion since 5/3 ,now off per HF   started bumex tid   cont  metoprolol, Imdur 60 mg daily, spironolactone, Entresto full dose, hydralazine tid   repeat CXR reviewed , no effusion or edema   - s/p repeat Heart cath 4/22    2- H/o Recurrent UTI   ESBL positive   strated invanz 5/3   urine cx from 4/25 ESBL reviewed sens    3-Chronic kidney disease stage 4  - Continue to monitor while on diuresis, slight increase stable   - Nephro on board     4-Hypomagnesemia / hypokalemia   replaced   resolved     5--Coronary artery disease  - Continue on aspirin, ticagrelor, and atorvastatin  - Recent cardiac catheterization with PCI, Brillinta dosage increased to 90 BID from 60 as per HF  - initial trop 140 -> 118  - c/w maalox     6-Hyponatremia   improving , na 131  today   - Hypervolemic on presentation    7--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 UTI   DC in 2-3 days

## 2025-05-07 LAB
-  AMPICILLIN: SIGNIFICANT CHANGE UP
-  CIPROFLOXACIN: SIGNIFICANT CHANGE UP
-  DAPTOMYCIN: SIGNIFICANT CHANGE UP
-  LEVOFLOXACIN: SIGNIFICANT CHANGE UP
-  LINEZOLID: SIGNIFICANT CHANGE UP
-  NITROFURANTOIN: SIGNIFICANT CHANGE UP
-  TETRACYCLINE: SIGNIFICANT CHANGE UP
-  VANCOMYCIN: SIGNIFICANT CHANGE UP
ALBUMIN SERPL ELPH-MCNC: 3.4 G/DL — SIGNIFICANT CHANGE UP (ref 3.3–5.2)
ALP SERPL-CCNC: 120 U/L — SIGNIFICANT CHANGE UP (ref 40–120)
ALT FLD-CCNC: 6 U/L — SIGNIFICANT CHANGE UP
ANION GAP SERPL CALC-SCNC: 17 MMOL/L — SIGNIFICANT CHANGE UP (ref 5–17)
AST SERPL-CCNC: 18 U/L — SIGNIFICANT CHANGE UP
BILIRUB SERPL-MCNC: 0.4 MG/DL — SIGNIFICANT CHANGE UP (ref 0.4–2)
BUN SERPL-MCNC: 63.7 MG/DL — HIGH (ref 8–20)
CALCIUM SERPL-MCNC: 8.5 MG/DL — SIGNIFICANT CHANGE UP (ref 8.4–10.5)
CHLORIDE SERPL-SCNC: 88 MMOL/L — LOW (ref 96–108)
CO2 SERPL-SCNC: 25 MMOL/L — SIGNIFICANT CHANGE UP (ref 22–29)
CREAT SERPL-MCNC: 2.45 MG/DL — HIGH (ref 0.5–1.3)
CULTURE RESULTS: ABNORMAL
EGFR: 20 ML/MIN/1.73M2 — LOW
EGFR: 20 ML/MIN/1.73M2 — LOW
GLUCOSE BLDC GLUCOMTR-MCNC: 166 MG/DL — HIGH (ref 70–99)
GLUCOSE BLDC GLUCOMTR-MCNC: 204 MG/DL — HIGH (ref 70–99)
GLUCOSE BLDC GLUCOMTR-MCNC: 204 MG/DL — HIGH (ref 70–99)
GLUCOSE BLDC GLUCOMTR-MCNC: 245 MG/DL — HIGH (ref 70–99)
GLUCOSE SERPL-MCNC: 197 MG/DL — HIGH (ref 70–99)
HCT VFR BLD CALC: 28.2 % — LOW (ref 34.5–45)
HGB BLD-MCNC: 9.2 G/DL — LOW (ref 11.5–15.5)
MCHC RBC-ENTMCNC: 28.8 PG — SIGNIFICANT CHANGE UP (ref 27–34)
MCHC RBC-ENTMCNC: 32.6 G/DL — SIGNIFICANT CHANGE UP (ref 32–36)
MCV RBC AUTO: 88.4 FL — SIGNIFICANT CHANGE UP (ref 80–100)
METHOD TYPE: SIGNIFICANT CHANGE UP
NRBC # BLD AUTO: 0 K/UL — SIGNIFICANT CHANGE UP (ref 0–0)
NRBC # FLD: 0 K/UL — SIGNIFICANT CHANGE UP (ref 0–0)
NRBC BLD AUTO-RTO: 0 /100 WBCS — SIGNIFICANT CHANGE UP (ref 0–0)
ORGANISM # SPEC MICROSCOPIC CNT: ABNORMAL
ORGANISM # SPEC MICROSCOPIC CNT: ABNORMAL
ORGANISM # SPEC MICROSCOPIC CNT: SIGNIFICANT CHANGE UP
PLATELET # BLD AUTO: 379 K/UL — SIGNIFICANT CHANGE UP (ref 150–400)
PMV BLD: 9.4 FL — SIGNIFICANT CHANGE UP (ref 7–13)
POTASSIUM SERPL-MCNC: 4.5 MMOL/L — SIGNIFICANT CHANGE UP (ref 3.5–5.3)
POTASSIUM SERPL-SCNC: 4.5 MMOL/L — SIGNIFICANT CHANGE UP (ref 3.5–5.3)
PROT SERPL-MCNC: 6.9 G/DL — SIGNIFICANT CHANGE UP (ref 6.6–8.7)
RBC # BLD: 3.19 M/UL — LOW (ref 3.8–5.2)
RBC # FLD: 17.6 % — HIGH (ref 10.3–14.5)
SODIUM SERPL-SCNC: 130 MMOL/L — LOW (ref 135–145)
SPECIMEN SOURCE: SIGNIFICANT CHANGE UP
WBC # BLD: 7.66 K/UL — SIGNIFICANT CHANGE UP (ref 3.8–10.5)
WBC # FLD AUTO: 7.66 K/UL — SIGNIFICANT CHANGE UP (ref 3.8–10.5)

## 2025-05-07 PROCEDURE — 99232 SBSQ HOSP IP/OBS MODERATE 35: CPT

## 2025-05-07 RX ORDER — METOPROLOL SUCCINATE 50 MG/1
25 TABLET, EXTENDED RELEASE ORAL AT BEDTIME
Refills: 0 | Status: DISCONTINUED | OUTPATIENT
Start: 2025-05-07 | End: 2025-05-08

## 2025-05-07 RX ORDER — BUMETANIDE 1 MG/1
4 TABLET ORAL
Refills: 0 | Status: DISCONTINUED | OUTPATIENT
Start: 2025-05-07 | End: 2025-05-09

## 2025-05-07 RX ORDER — MAGNESIUM OXIDE 400 MG
400 TABLET ORAL
Refills: 0 | Status: DISCONTINUED | OUTPATIENT
Start: 2025-05-07 | End: 2025-05-09

## 2025-05-07 RX ADMIN — INSULIN GLARGINE-YFGN 5 UNIT(S): 100 INJECTION, SOLUTION SUBCUTANEOUS at 22:15

## 2025-05-07 RX ADMIN — BUMETANIDE 4 MILLIGRAM(S): 1 TABLET ORAL at 12:17

## 2025-05-07 RX ADMIN — BUMETANIDE 4 MILLIGRAM(S): 1 TABLET ORAL at 05:52

## 2025-05-07 RX ADMIN — Medication 400 MILLIGRAM(S): at 16:38

## 2025-05-07 RX ADMIN — Medication 37.5 MILLIGRAM(S): at 05:52

## 2025-05-07 RX ADMIN — METOPROLOL SUCCINATE 25 MILLIGRAM(S): 50 TABLET, EXTENDED RELEASE ORAL at 22:25

## 2025-05-07 RX ADMIN — LIDOCAINE HYDROCHLORIDE 1 PATCH: 20 JELLY TOPICAL at 00:00

## 2025-05-07 RX ADMIN — ERTAPENEM SODIUM 100 MILLIGRAM(S): 1 INJECTION, POWDER, LYOPHILIZED, FOR SOLUTION INTRAMUSCULAR; INTRAVENOUS at 16:39

## 2025-05-07 RX ADMIN — METHOCARBAMOL 500 MILLIGRAM(S): 500 TABLET, FILM COATED ORAL at 16:38

## 2025-05-07 RX ADMIN — Medication 1 LOZENGE: at 17:43

## 2025-05-07 RX ADMIN — ISOSORBDIE DINITRATE 20 MILLIGRAM(S): 30 TABLET ORAL at 12:17

## 2025-05-07 RX ADMIN — LIDOCAINE HYDROCHLORIDE 1 PATCH: 20 JELLY TOPICAL at 12:16

## 2025-05-07 RX ADMIN — TICAGRELOR 90 MILLIGRAM(S): 90 TABLET ORAL at 16:38

## 2025-05-07 RX ADMIN — Medication 1 SPRAY(S): at 16:40

## 2025-05-07 RX ADMIN — Medication 975 MILLIGRAM(S): at 05:51

## 2025-05-07 RX ADMIN — INSULIN LISPRO 2: 100 INJECTION, SOLUTION INTRAVENOUS; SUBCUTANEOUS at 12:18

## 2025-05-07 RX ADMIN — ISOSORBDIE DINITRATE 20 MILLIGRAM(S): 30 TABLET ORAL at 05:51

## 2025-05-07 RX ADMIN — Medication 1300 MILLIGRAM(S): at 05:52

## 2025-05-07 RX ADMIN — Medication 1300 MILLIGRAM(S): at 12:16

## 2025-05-07 RX ADMIN — INSULIN LISPRO 4: 100 INJECTION, SOLUTION INTRAVENOUS; SUBCUTANEOUS at 17:43

## 2025-05-07 RX ADMIN — Medication 81 MILLIGRAM(S): at 12:17

## 2025-05-07 RX ADMIN — Medication 40 MILLIGRAM(S): at 05:51

## 2025-05-07 RX ADMIN — ATORVASTATIN CALCIUM 80 MILLIGRAM(S): 80 TABLET, FILM COATED ORAL at 22:25

## 2025-05-07 RX ADMIN — Medication 975 MILLIGRAM(S): at 22:26

## 2025-05-07 RX ADMIN — Medication 3 MILLIGRAM(S): at 22:25

## 2025-05-07 RX ADMIN — Medication 975 MILLIGRAM(S): at 12:18

## 2025-05-07 RX ADMIN — GABAPENTIN 400 MILLIGRAM(S): 400 CAPSULE ORAL at 12:17

## 2025-05-07 RX ADMIN — TICAGRELOR 90 MILLIGRAM(S): 90 TABLET ORAL at 05:51

## 2025-05-07 RX ADMIN — Medication 25 MILLIGRAM(S): at 22:25

## 2025-05-07 RX ADMIN — ISOSORBDIE DINITRATE 20 MILLIGRAM(S): 30 TABLET ORAL at 16:41

## 2025-05-07 RX ADMIN — Medication 1300 MILLIGRAM(S): at 22:25

## 2025-05-07 RX ADMIN — Medication 40 MILLIEQUIVALENT(S): at 12:17

## 2025-05-07 RX ADMIN — METHOCARBAMOL 500 MILLIGRAM(S): 500 TABLET, FILM COATED ORAL at 05:51

## 2025-05-07 RX ADMIN — Medication 25 MILLIGRAM(S): at 16:42

## 2025-05-07 RX ADMIN — LIDOCAINE HYDROCHLORIDE 1 PATCH: 20 JELLY TOPICAL at 19:00

## 2025-05-07 RX ADMIN — Medication 975 MILLIGRAM(S): at 23:26

## 2025-05-07 RX ADMIN — INSULIN LISPRO 4: 100 INJECTION, SOLUTION INTRAVENOUS; SUBCUTANEOUS at 08:59

## 2025-05-07 NOTE — CHART NOTE - NSCHARTNOTESELECT_GEN_ALL_CORE
Event Note
MEMS check/Event Note
Nutrition Services
Electrophysiology/Event Note
Event Note
Nutrition Services
Home

## 2025-05-07 NOTE — PROGRESS NOTE ADULT - PROBLEM SELECTOR PLAN 3
- Baseline Cr ~1.6-1.7?  - Renal function has been labile likely due to cardiorenal syndrome   - Diuretic augmentation as above  - Renal duplex without evidence of SHELLY  - Avoid nephrotoxics  - Nephrology following

## 2025-05-07 NOTE — PROGRESS NOTE ADULT - SUBJECTIVE AND OBJECTIVE BOX
[facility] Advanced Heart Failure - Progress Note  [address] [place] [address]  Office Phone: [phone]/Fax: [phone]  Service/On Call Phone [phone]    Subjective/Objective: No acute events overnight. Patient feeling well and denies any overt HF symptoms.    Medications:  acetaminophen     Tablet .. 975 milliGRAM(s) Oral every 8 hours  aluminum hydroxide/magnesium hydroxide/simethicone Suspension 30 milliLiter(s) Oral every 4 hours PRN  aspirin  chewable 81 milliGRAM(s) Oral daily  atorvastatin 80 milliGRAM(s) Oral at bedtime  benzocaine/menthol Lozenge 1 Lozenge Oral every 2 hours PRN  buMETAnide 4 milliGRAM(s) Oral two times a day  dextrose 5%. 1000 milliLiter(s) IV Continuous <Continuous>  dextrose 5%. 1000 milliLiter(s) IV Continuous <Continuous>  dextrose 50% Injectable 25 Gram(s) IV Push once  dextrose 50% Injectable 12.5 Gram(s) IV Push once  dextrose 50% Injectable 25 Gram(s) IV Push once  dextrose Oral Gel 15 Gram(s) Oral once PRN  ertapenem  IVPB 500 milliGRAM(s) IV Intermittent every 24 hours  gabapentin 400 milliGRAM(s) Oral daily  glucagon  Injectable 1 milliGRAM(s) IntraMuscular once  hydrALAZINE 25 milliGRAM(s) Oral three times a day  insulin glargine Injectable (LANTUS) 5 Unit(s) SubCutaneous at bedtime  insulin lispro (ADMELOG) corrective regimen sliding scale   SubCutaneous three times a day before meals  isosorbide   dinitrate Tablet (ISORDIL) 20 milliGRAM(s) Oral three times a day  lidocaine   4% Patch 1 Patch Transdermal every 24 hours  loperamide 2 milliGRAM(s) Oral two times a day PRN  magnesium oxide 400 milliGRAM(s) Oral two times a day with meals  melatonin 3 milliGRAM(s) Oral at bedtime  methocarbamol 500 milliGRAM(s) Oral two times a day  metoprolol succinate ER 25 milliGRAM(s) Oral at bedtime  ondansetron Injectable 4 milliGRAM(s) IV Push every 6 hours PRN  pantoprazole    Tablet 40 milliGRAM(s) Oral before breakfast  potassium chloride    Tablet ER 40 milliEquivalent(s) Oral daily  sodium bicarbonate 1300 milliGRAM(s) Oral three times a day  sodium chloride 0.65% Nasal 1 Spray(s) Both Nostrils two times a day  ticagrelor 90 milliGRAM(s) Oral every 12 hours    Vital Signs Last 24 Hours  T(C): 36.8 (25 @ 12:29), Max: 37.2 (25 @ 04:55)  HR: 81 (25 @ 12:29) (73 - 86)  BP: 105/62 (25 @ 12:29) (92/60 - 120/71)  RR: 17 (25 @ 12:29) (17 - 18)  SpO2: 98% (25 @ 12:29) (95% - 99%)    Weight in k.4 ( @ 08:25)    I&O's Summary  06 May 2025 07:01  -  07 May 2025 07:00  --------------------------------------------------------  IN: 610 mL / OUT: 1850 mL / NET: -1240 mL    07 May 2025 07:01  -  07 May 2025 14:32  --------------------------------------------------------  IN: 0 mL / OUT: 400 mL / NET: -400 mL    Tele: BiV paced    Physical Exam:  General: Sitting up in chair, in no distress.  Neck: Neck supple. JVP not elevated.   Chest: Clear to auscultation bilaterally.  CV: RRR. Normal S1 and S2. No murmurs, rub, or gallops. Warm peripherally.  PV: No LE edema noted.   Abdomen: Soft, non-distended.  Skin: warm, dry.  Neurology: Alert and oriented times three. Sensation intact.  Psych: Appropriate affect.    Labs:                        9.2    7.66  )-----------( 379      ( 07 May 2025 05:45 )             28.2         130[L]  |  88[L]  |  63.7[H]  ----------------------------<  197[H]  4.5   |  25.0  |  2.45[H]    Ca    8.5      07 May 2025 05:45    TPro  6.9  /  Alb  3.4  /  TBili  0.4  /  DBili  x   /  AST  18  /  ALT  6   /  AlkPhos  120

## 2025-05-07 NOTE — CHART NOTE - NSCHARTNOTEFT_GEN_A_CORE
Source: Patient, chart, family     Current Diet: Diet, DASH/TLC:   Sodium & Cholesterol Restricted  Consistent Carbohydrate {Evening Snack} (CSTCHOSN)  1000mL Fluid Restriction ([id number])     Special Instructions for Nursing:  Sodium & Cholesterol Restricted (05-02-25 @ 12:29)    PO intake:  %      Source for PO intake: Patient, family, chart    Current Weight:   4/11 158 lbs  4/30 151.6lbs  5/3 143 lbs  5/5 142.1 lbs   5/6 142.41 lbs    Left/right ankle 1+ edema     Pertinent Medications: MEDICATIONS  (STANDING):  buMETAnide 4 milliGRAM(s) Oral two times a day  dextrose 5%. 1000 milliLiter(s) (100 mL/Hr) IV Continuous <Continuous>  dextrose 50% Injectable 25 Gram(s) IV Push once  ertapenem  IVPB 500 milliGRAM(s) IV Intermittent every 24 hours  glucagon  Injectable 1 milliGRAM(s) IntraMuscular once  hydrALAZINE 25 milliGRAM(s) Oral three times a day  insulin glargine Injectable (LANTUS) 5 Unit(s) SubCutaneous at bedtime  insulin lispro (ADMELOG) corrective regimen sliding scale   SubCutaneous three times a day before meals  isosorbide   dinitrate Tablet (ISORDIL) 20 milliGRAM(s) Oral three times a day  magnesium oxide 400 milliGRAM(s) Oral two times a day with meals  metoprolol succinate ER 25 milliGRAM(s) Oral at bedtime  pantoprazole    Tablet 40 milliGRAM(s) Oral before breakfast  potassium chloride    Tablet ER 40 milliEquivalent(s) Oral daily  sodium bicarbonate 1300 milliGRAM(s) Oral three times a day  ticagrelor 90 milliGRAM(s) Oral every 12 hours    MEDICATIONS  (PRN):  dextrose Oral Gel 15 Gram(s) Oral once PRN Blood Glucose LESS THAN 70 milliGRAM(s)/deciliter  loperamide 2 milliGRAM(s) Oral two times a day PRN Diarrhea  ondansetron Injectable 4 milliGRAM(s) IV Push every 6 hours PRN Nausea and/or Vomiting    Pertinent Labs: 05-07 Na130 mmol/L[L] Glu 197 mg/dL[H] K+ 4.5 mmol/L Cr  2.45 mg/dL[H] BUN 63.7 mg/dL[H] Alb 3.4 g/dL     Nutrition focused physical exam conducted previously with signs of malnutrition absent    Estimated Needs:   6390-9515 kcal (30-35 kcal/kg 45.3kg)  54-63g protein (1.2-1.4g/kg 45.3kg)    Hospital Course: Per chart "74yoF with a history of coronary artery disease, heart failure, chronic kidney disease, hypertension, diabetes, and anemia who was recently hospitalized for NSTEMI with PCI and bacteremia who presented with dyspnea on exertion and weight gain. Continues on Bumex drip as per cardiology additional diuretics with metolazone / Bumex IV given 4/21. s/p repeat heart cath 4/22 with no need for MEMS recalibration however plan for upgarde to CRT-P vs CRT-D device 4/24, s/p Cardiac MR. Diuresis resumed by HF team 4/25, continue to monitor."    Current Nutrition Diagnosis: Increased Nutrient Needs (protein) related to increased physiological demand for healing as evidenced by heart failure.    Patient Pashto speaking - family at bedside to interpret per pt request. Pt with greatly improved appetite/PO intake and tolerating diet well. Breakfast tray at bedside with 100% consumed. Pt with no c/o N/V/C/D at this time, reports her last BM was today. Pt with no further questions at this time. Will continue to monitor and follow up as needed. RD remains available.     Recommendations:   1) Continue diet as tolerated.   2) Encourage po intake, monitor diet tolerance, and provide assistance at meals as needed.   3) Monitor BG levels, correct prn   4) Obtain daily weights to monitor trends.     Monitoring and Evaluation:   [x] PO intake [x] Tolerance to diet prescription [X] Weights  [X] Follow up per protocol [X] Labs: chem 8, POCT glucose

## 2025-05-07 NOTE — PROGRESS NOTE ADULT - PROBLEM SELECTOR PLAN 1
- ICM, most recent TTE on 4/21/25 showed further decline of LVEF to 20-25%  - Serum pro-BNP > 70k on 4/21. Repeat pro-BNP 43k on 4/28.  - Her CardioMEMS PAD is 17 today which is the lowest in the past month. It was 23 yesterday. Her sCr is improving.   - Clinically appears euvolemic on exam w/warm extremities.  - Diuretics: Reduce Bumex to 4 mg PO BID.  - GDMT: Start Toprol XL 25 mg nightly. Reduce HDZN to 25 mg TID. C/w Isordil 20 mg TID. Aldactone previously stopped for hyperkalemia. No Farxiga 2/2 h/o pyelonephritis.   - Device: h/o Micra PPM with chronic . She is now s/p CRT-D on 4/24.  - Low Na, 1.5L FR diet recommended  - Please document strict I&Os and daily standing weight.  - Needs daily PT. Will refer for cardiac rehab as an outpt.  - Please initiate DC planning.

## 2025-05-07 NOTE — PROGRESS NOTE ADULT - SUBJECTIVE AND OBJECTIVE BOX
Patient is a 74y old  Female who presents with a chief complaint of HF exacerbation (25 Apr 2025 07:46)      Patient seen and examined at bedside in am   she is with grand daughter walking to bed from bathroom   Pt is feeling tired today , + lower back pain after walking   no SOB on room air      No overnight events reported.     ALLERGIES:  No Known Allergies    MEDICATIONS  (STANDING):  acetaminophen     Tablet .. 975 milliGRAM(s) Oral every 8 hours  aspirin  chewable 81 milliGRAM(s) Oral daily  atorvastatin 80 milliGRAM(s) Oral at bedtime  buMETAnide 4 milliGRAM(s) Oral two times a day  dextrose 5%. 1000 milliLiter(s) (100 mL/Hr) IV Continuous <Continuous>  dextrose 5%. 1000 milliLiter(s) (50 mL/Hr) IV Continuous <Continuous>  dextrose 50% Injectable 25 Gram(s) IV Push once  dextrose 50% Injectable 12.5 Gram(s) IV Push once  dextrose 50% Injectable 25 Gram(s) IV Push once  ertapenem  IVPB 500 milliGRAM(s) IV Intermittent every 24 hours  gabapentin 400 milliGRAM(s) Oral daily  glucagon  Injectable 1 milliGRAM(s) IntraMuscular once  hydrALAZINE 25 milliGRAM(s) Oral three times a day  insulin glargine Injectable (LANTUS) 5 Unit(s) SubCutaneous at bedtime  insulin lispro (ADMELOG) corrective regimen sliding scale   SubCutaneous three times a day before meals  isosorbide   dinitrate Tablet (ISORDIL) 20 milliGRAM(s) Oral three times a day  lidocaine   4% Patch 1 Patch Transdermal every 24 hours  melatonin 3 milliGRAM(s) Oral at bedtime  methocarbamol 500 milliGRAM(s) Oral two times a day  metoprolol succinate ER 25 milliGRAM(s) Oral at bedtime  pantoprazole    Tablet 40 milliGRAM(s) Oral before breakfast  potassium chloride    Tablet ER 40 milliEquivalent(s) Oral daily  sodium bicarbonate 1300 milliGRAM(s) Oral three times a day  sodium chloride 0.65% Nasal 1 Spray(s) Both Nostrils two times a day  ticagrelor 90 milliGRAM(s) Oral every 12 hours    MEDICATIONS  (PRN):  aluminum hydroxide/magnesium hydroxide/simethicone Suspension 30 milliLiter(s) Oral every 4 hours PRN Dyspepsia  benzocaine/menthol Lozenge 1 Lozenge Oral every 2 hours PRN Sore Throat  dextrose Oral Gel 15 Gram(s) Oral once PRN Blood Glucose LESS THAN 70 milliGRAM(s)/deciliter  loperamide 2 milliGRAM(s) Oral two times a day PRN Diarrhea  ondansetron Injectable 4 milliGRAM(s) IV Push every 6 hours PRN Nausea and/or Vomiting    Vital Signs Last 24 Hrs  T(F): 97.7 (07 May 2025 07:23), Max: 99 (07 May 2025 04:55)  HR: 74 (07 May 2025 07:23) (73 - 86)  BP: 109/65 (07 May 2025 07:23) (92/60 - 120/71)  RR: 18 (07 May 2025 07:23) (17 - 18)  SpO2: 98% (07 May 2025 07:23) (95% - 99%)  I&O's Summary    06 May 2025 07:01  -  07 May 2025 07:00  --------------------------------------------------------  IN: 610 mL / OUT: 1850 mL / NET: -1240 mL        PHYSICAL EXAM:  General: awake   Neck: supple   Lungs: CTA   Cardio: RRR, S1/S2, No murmur  Abdomen: Soft, Nontender, Nondistended; Bowel sounds present  Extremities: No calf tenderness, No pitting edema    LABS:                        9.2    7.66  )-----------( 379      ( 07 May 2025 05:45 )             28.2     05-07    130  |  88  |  63.7  ----------------------------<  197  4.5   |  25.0  |  2.45    Ca    8.5      07 May 2025 05:45  Mg     1.8     05-05    TPro  6.9  /  Alb  3.4  /  TBili  0.4  /  DBili  x   /  AST  18  /  ALT  6   /  AlkPhos  120  05-07                    03-22 Chol 120 mg/dL LDL -- HDL 34 mg/dL Trig 154 mg/dL              POCT Blood Glucose.: 245 mg/dL (07 May 2025 08:27)  POCT Blood Glucose.: 246 mg/dL (06 May 2025 21:11)  POCT Blood Glucose.: 230 mg/dL (06 May 2025 17:24)  POCT Blood Glucose.: 182 mg/dL (06 May 2025 12:08)      Urinalysis Basic - ( 07 May 2025 05:45 )    Color: x / Appearance: x / SG: x / pH: x  Gluc: 197 mg/dL / Ketone: x  / Bili: x / Urobili: x   Blood: x / Protein: x / Nitrite: x   Leuk Esterase: x / RBC: x / WBC x   Sq Epi: x / Non Sq Epi: x / Bacteria: x        Culture - Urine (collected 04 May 2025 10:00)  Source: Clean Catch Clean Catch (Midstream)  Preliminary Report (06 May 2025 22:14):    10,000 - 49,000 CFU/mL Escherichia coli ESBL    10,000 - 49,000 CFU/mL Enterococcus faecium  Organism: Escherichia coli ESBL (06 May 2025 22:10)  Organism: Escherichia coli ESBL (06 May 2025 22:10)      -  Levofloxacin: R >4      -  Tobramycin: S <=2      -  Nitrofurantoin: S <=32 Should not be used to treat pyelonephritis      -  Aztreonam: R >16      -  Gentamicin: S <=2      -  Cefazolin: R >16 For uncomplicated UTI with K. pneumoniae, E. coli, or P. mirablis: RENNY <=16 is sensitive and RENNY >=32 is resistant. This also predicts results for oral agents cefaclor, cefdinir, cefpodoxime, cefprozil, cefuroxime axetil, cephalexin and locarbef for uncomplicated UTI. Note that some isolates may be susceptible to these agents while testing resistant to cefazolin.      -  Cefepime: R >16      -  Piperacillin/Tazobactam: R 64      -  Ciprofloxacin: R >2      -  Imipenem: S <=1      -  Ceftriaxone: R >32      -  Ampicillin: R >16 These ampicillin results predict results for amoxicillin      Method Type: RENNY      -  Meropenem: S <=1      -  Ampicillin/Sulbactam: R >16/8      -  Cefuroxime: R >16      -  Trimethoprim/Sulfamethoxazole: R >2/38      -  Tigecycline: S <=2 Interpretations based on FDA breakpoints      -  Ertapenem: S <=0.5        RADIOLOGY & ADDITIONAL TESTS:

## 2025-05-07 NOTE — PROGRESS NOTE ADULT - ASSESSMENT
74yoF with a history of coronary artery disease, heart failure, chronic kidney disease, hypertension, diabetes, and anemia who was recently hospitalized for NSTEMI with PCI and bacteremia who presented with dyspnea on exertion and weight gain. Continues on Bumex drip as per cardiology additional diuretics with metolazone / Bumex IV given 4/21. s/p repeat heart cath 4/22 with no need for MEMS recalibration however plan for upgarde to CRT-P vs CRT-D device 4/24, s/p Cardiac MR. Diuresis resumed by HF team 4/25, continue to monitor.     1-Acute on chronic systolic heart failure  HF team is following rec appreciated   s/p dobutamine and bumex iv infusion from  5/3   cont bumex tid   cont metoprolol, Imdur 60 mg daily, spironolactone, Entresto full dose, hydralazine tid   repeat CXR reviewed     2- ESBL UTI   given zosyn in april   sens reviewed started invanz 5/3     3-Chronic kidney disease stage 4  - Continue to monitor while on diuresis, slight increase stable   - Nephro is following     4-Hypomagnesemia / hypokalemia   resolved   replaced K and mag     5--Coronary artery disease  - Continue on aspirin, ticagrelor, and atorvastatin  - Recent cardiac catheterization with PCI, Brillinta dosage increased to 90 BID from 60 as per HF    6-Hyponatremia   improving   stable     7--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 UTI   DC in 2 days

## 2025-05-07 NOTE — PROGRESS NOTE ADULT - NS ATTEND AMEND GEN_ALL_CORE FT
Ms. Ko is a 75 y/o Burmese speaking female with PMH of ICM/HFrEF (LVEF 35-40%), CAD s/p PCI, Mobitz II s/p PPM (MIRNA Dawn), HTN, HLD, PAD with chronic right LE wound, DM2, and CKD3, who presented to the ED on 3/19 with c/o CP, found to have NSTEMI. She underwent PCI to her RCA for ISR on 3/21/25. Over the next couple of days she developed fever, leukocytosis and worsening renal function. Her blood cultures are positive for ESBL E.coli and CT abdomen is concerning for left pyelonephritis. She was treated with antibiotics and was discharged on oral torsemide and she was instructed to check her cardiomems reading for further diuretic titration. She presented to the HF clinic on April 9th and was found to be very volume overloaded on exam with pro BNP > 92386, weight gain of 20 pounds and cardiomems PAD 32-33. She was admitted at [facility] for further management and underwent CRT-D during this admission.     TTE: 3/21/25 LVEF 25-30%, LVEDD 4.6 cm, normal RV size and function, moderate MR, mild TR, PASP 58  TTE: LVEF 20 -25%, LVEDD 4.3 cm, mild RVE with mild RV dysfunction, moderate MR and moderate TR, PASP 57, AV dyssynchrony noted.   RHC: 4/22/25: RA 18, RV 63/18, PA 63/25/37, PCW 22, PA sat 37%, Jose CO/CI 2.5/1.5, Thermo CO 2.5, SVR 2800, PVR 4.5 WILLARD. No CardioMEMS recalibration needed.      Assessment:  Acute on chronic systolic heart failure  s/p CRT-D on 4/24/2025 for AV dyssynchrony and 100% RV pacing  Severe LV systolic function  Mild RV dysfunction  Ischemic cardiomyopathy  CAD s/p recent PCI to RCA ISR in 3/25 with LAD and LCx disease  Chronic kidney disease  Mobitz type II s/p MICRGLORIA  Hypertension  Hyperlipidemia  PAD  Diabetes mellitus type 2      Plan:   Cardiomems PAD better. Goal PAD 20.  Switch bumex to oral 4 mg BID.   Start Toprol XL 25 mg daily.   Switch hydralazine to 25 mg TID and isordil 20 mg TID. Will switch to entresto once creatinine improves.   Aldactone previously stopped for hyperkalemia. No Farxiga 2/2 h/o pyelonephritis.   Plan for discharge on Thursday or Friday with follow up in HF clinic next tuesday am.   She would also need follow up with EP as outpatient.

## 2025-05-07 NOTE — PROGRESS NOTE ADULT - ASSESSMENT
Gen Cards: Dr. Allen  HF: Dr. Polanco  IC: Dr. Bajwa    75 y/o female with PMH of ICM/HFrEF (LVEF 35-40%), CAD s/p PCI, Mobitz II s/p PPM (MD NereydaT), HTN, HLD, PAD with chronic right LE wound, DM2, and CKD3, who was BIBA from CHF clinic due to hypervolemia, weight gain of ~20 lbs in the past 9 days despite escalation of PO diuretics.      She was recently admitted on 3/19/25 with c/o CP and found to have NSTEMI. She underwent LHC on 3/21 which showed mid-distal LAD severe stenosis, OM1 and OM2 severe stenosis, mid ISR 50%, distal ISR 99%, s/p PCI with 1 ZEINAB to RCA.  On 3/22 she had a fever with leukocytosis and her blood cultures were positive for E. Coli/ESBL. Her CT A/P showed left pyelonephritis. She also had an BRADY for which her Entresto was held. She was discharged home 3/31 and her CardioMEMS was not at goal and had been rising. She was advised to increase Torsemide 40 mg daily to twice daily on 4/4. Despite this, she has continued to gain weight and reports poor UOP response.     Admission labwork: Na 125, K 4.6, BUN 37.7, Cr 1.94, eGFR 27, Mg 2.3, proBNP 98012!  CXR: increased interstitial markings bilaterally, cardiomegaly.   On 4/10 she developed a fever, tmax 100.4F. RVP negative, empirically started on IV Zosyn.  4/13 s/p right knee fluid aspiration (s/p recent trauma/fall).  4/15 initiated on continuous Bumex infusion for inadequate response to intermittent dosing.  4/21: Received 1 dose of metolazone 5 mg and Bumex gtt switched to 4 mg IV BID.  4/24: s/p CRT-D  5/5: Started on  @ 2.5 mcg/kg/min over weekend.   5/6: No significant improvement in renal function w/  inotrope assisted diuresis. DC . Switch to oral Bumex w/ close monitoring of renal fx.     Prior Cardiac Testing:  RHC 4/22/25: RA 18, RV 63/18, PA 63/25/37, PCW 22, PA sat 37%, Jose CO/CI 2.5/1.5, Thermo CO 2.5, SVR 2800 dsc, PVR 4.5 WILLARD. No CardioMEMS recalibration needed.     TTE 4/21/25: LVEF 20-25%, LVIDd 4.3 cm, LVOT VTI 12.6 cm, grade 2 DD, mild RVE with mild RV dysfunction, mild-mod AS, mod MR/TR, mild AL, est PASP 57 mmHg, est RAP 8 mmHg.    TTE 3/21/25: LVEF 25-30%, grade II DD, normal RV size/function, severe LAE, moderate MR, mild TR, PASP 58 mmHg ( RAP 8 mmHg), no evidence of LV thrombus, multiple segmental abnormalities/regional WMA.    CardioMEMS PAD:  5/7: 17  5/6: 22-23  5/4: 25  5/2: 27-28 5/1: 24  4/30: 26  4/29: 24  4/28: 26  4/25: 24  4/22: 29-30  4/20: 31  4/18: 27  4/17: 27 (in chair ~30 degrees).  4/14: 30-33  4/11: 32  4/9: 32  3/27: 26  3/24: 23  3/21: 22

## 2025-05-08 LAB
ANION GAP SERPL CALC-SCNC: 17 MMOL/L — SIGNIFICANT CHANGE UP (ref 5–17)
BUN SERPL-MCNC: 62.2 MG/DL — HIGH (ref 8–20)
CALCIUM SERPL-MCNC: 8.6 MG/DL — SIGNIFICANT CHANGE UP (ref 8.4–10.5)
CHLORIDE SERPL-SCNC: 90 MMOL/L — LOW (ref 96–108)
CO2 SERPL-SCNC: 25 MMOL/L — SIGNIFICANT CHANGE UP (ref 22–29)
CREAT SERPL-MCNC: 2.39 MG/DL — HIGH (ref 0.5–1.3)
EGFR: 21 ML/MIN/1.73M2 — LOW
EGFR: 21 ML/MIN/1.73M2 — LOW
GLUCOSE BLDC GLUCOMTR-MCNC: 184 MG/DL — HIGH (ref 70–99)
GLUCOSE BLDC GLUCOMTR-MCNC: 193 MG/DL — HIGH (ref 70–99)
GLUCOSE BLDC GLUCOMTR-MCNC: 195 MG/DL — HIGH (ref 70–99)
GLUCOSE BLDC GLUCOMTR-MCNC: 228 MG/DL — HIGH (ref 70–99)
GLUCOSE SERPL-MCNC: 177 MG/DL — HIGH (ref 70–99)
MAGNESIUM SERPL-MCNC: 2.2 MG/DL — SIGNIFICANT CHANGE UP (ref 1.8–2.6)
MRSA PCR RESULT.: SIGNIFICANT CHANGE UP
NT-PROBNP SERPL-SCNC: HIGH PG/ML (ref 0–300)
POTASSIUM SERPL-MCNC: 4.7 MMOL/L — SIGNIFICANT CHANGE UP (ref 3.5–5.3)
POTASSIUM SERPL-SCNC: 4.7 MMOL/L — SIGNIFICANT CHANGE UP (ref 3.5–5.3)
S AUREUS DNA NOSE QL NAA+PROBE: SIGNIFICANT CHANGE UP
SODIUM SERPL-SCNC: 132 MMOL/L — LOW (ref 135–145)

## 2025-05-08 PROCEDURE — 99232 SBSQ HOSP IP/OBS MODERATE 35: CPT

## 2025-05-08 RX ORDER — METOPROLOL SUCCINATE 50 MG/1
50 TABLET, EXTENDED RELEASE ORAL AT BEDTIME
Refills: 0 | Status: DISCONTINUED | OUTPATIENT
Start: 2025-05-08 | End: 2025-05-09

## 2025-05-08 RX ADMIN — LIDOCAINE HYDROCHLORIDE 1 PATCH: 20 JELLY TOPICAL at 20:38

## 2025-05-08 RX ADMIN — ATORVASTATIN CALCIUM 80 MILLIGRAM(S): 80 TABLET, FILM COATED ORAL at 21:34

## 2025-05-08 RX ADMIN — BUMETANIDE 4 MILLIGRAM(S): 1 TABLET ORAL at 12:51

## 2025-05-08 RX ADMIN — Medication 3 MILLIGRAM(S): at 21:34

## 2025-05-08 RX ADMIN — Medication 975 MILLIGRAM(S): at 13:02

## 2025-05-08 RX ADMIN — Medication 1300 MILLIGRAM(S): at 13:02

## 2025-05-08 RX ADMIN — Medication 975 MILLIGRAM(S): at 21:31

## 2025-05-08 RX ADMIN — ISOSORBDIE DINITRATE 20 MILLIGRAM(S): 30 TABLET ORAL at 11:51

## 2025-05-08 RX ADMIN — METOPROLOL SUCCINATE 50 MILLIGRAM(S): 50 TABLET, EXTENDED RELEASE ORAL at 21:34

## 2025-05-08 RX ADMIN — INSULIN LISPRO 2: 100 INJECTION, SOLUTION INTRAVENOUS; SUBCUTANEOUS at 17:34

## 2025-05-08 RX ADMIN — METHOCARBAMOL 500 MILLIGRAM(S): 500 TABLET, FILM COATED ORAL at 06:28

## 2025-05-08 RX ADMIN — Medication 400 MILLIGRAM(S): at 08:27

## 2025-05-08 RX ADMIN — LIDOCAINE HYDROCHLORIDE 1 PATCH: 20 JELLY TOPICAL at 08:28

## 2025-05-08 RX ADMIN — Medication 25 MILLIGRAM(S): at 12:51

## 2025-05-08 RX ADMIN — ISOSORBDIE DINITRATE 20 MILLIGRAM(S): 30 TABLET ORAL at 06:28

## 2025-05-08 RX ADMIN — Medication 1300 MILLIGRAM(S): at 21:34

## 2025-05-08 RX ADMIN — Medication 975 MILLIGRAM(S): at 22:30

## 2025-05-08 RX ADMIN — LIDOCAINE HYDROCHLORIDE 1 PATCH: 20 JELLY TOPICAL at 00:00

## 2025-05-08 RX ADMIN — METHOCARBAMOL 500 MILLIGRAM(S): 500 TABLET, FILM COATED ORAL at 16:26

## 2025-05-08 RX ADMIN — TICAGRELOR 90 MILLIGRAM(S): 90 TABLET ORAL at 16:26

## 2025-05-08 RX ADMIN — INSULIN LISPRO 2: 100 INJECTION, SOLUTION INTRAVENOUS; SUBCUTANEOUS at 08:27

## 2025-05-08 RX ADMIN — Medication 25 MILLIGRAM(S): at 21:33

## 2025-05-08 RX ADMIN — Medication 400 MILLIGRAM(S): at 16:26

## 2025-05-08 RX ADMIN — Medication 25 MILLIGRAM(S): at 06:28

## 2025-05-08 RX ADMIN — Medication 1 SPRAY(S): at 06:31

## 2025-05-08 RX ADMIN — LIDOCAINE HYDROCHLORIDE 1 PATCH: 20 JELLY TOPICAL at 19:38

## 2025-05-08 RX ADMIN — BUMETANIDE 4 MILLIGRAM(S): 1 TABLET ORAL at 06:27

## 2025-05-08 RX ADMIN — ISOSORBDIE DINITRATE 20 MILLIGRAM(S): 30 TABLET ORAL at 16:26

## 2025-05-08 RX ADMIN — Medication 81 MILLIGRAM(S): at 08:27

## 2025-05-08 RX ADMIN — TICAGRELOR 90 MILLIGRAM(S): 90 TABLET ORAL at 06:27

## 2025-05-08 RX ADMIN — Medication 975 MILLIGRAM(S): at 06:27

## 2025-05-08 RX ADMIN — Medication 1300 MILLIGRAM(S): at 06:27

## 2025-05-08 RX ADMIN — Medication 40 MILLIGRAM(S): at 06:28

## 2025-05-08 RX ADMIN — Medication 1 SPRAY(S): at 16:26

## 2025-05-08 RX ADMIN — GABAPENTIN 400 MILLIGRAM(S): 400 CAPSULE ORAL at 08:27

## 2025-05-08 RX ADMIN — Medication 975 MILLIGRAM(S): at 07:27

## 2025-05-08 RX ADMIN — INSULIN LISPRO 2: 100 INJECTION, SOLUTION INTRAVENOUS; SUBCUTANEOUS at 12:51

## 2025-05-08 RX ADMIN — INSULIN GLARGINE-YFGN 5 UNIT(S): 100 INJECTION, SOLUTION SUBCUTANEOUS at 21:30

## 2025-05-08 RX ADMIN — Medication 40 MILLIEQUIVALENT(S): at 08:27

## 2025-05-08 NOTE — PROGRESS NOTE ADULT - PROBLEM SELECTOR PLAN 1
- ICM, most recent TTE on 4/21/25 showed further decline of LVEF to 20-25%  - Serum pro-BNP > 70k on 4/21. Repeat pro-BNP today was down to 30,223.  - Clinically appears euvolemic on exam w/warm extremities.  - Her PAD on her CardioMEMS is 20 today. Will make her goal 20-22.  - Diuretics: Continue Bumex 4 mg PO BID.  - GDMT: Increase Toprol XL to 50 mg nightly. Continue HDZN 25 mg TID and Isordil 20 mg TID. Aldactone previously stopped for hyperkalemia. She is now on KCl 40 meq daily. If her renal function remains stable/improves can consider switching to aldactone. No Farxiga 2/2 h/o pyelonephritis.   - Device: h/o Micra PPM with chronic . She is now s/p CRT-D on 4/24.  - Low Na, 1.5L FR diet recommended  - Please document strict I&Os and daily standing weight.  - Needs daily PT. Will refer for cardiac rehab as an outpt.  - Possibly d/c home tomorrow.

## 2025-05-08 NOTE — PROGRESS NOTE ADULT - SUBJECTIVE AND OBJECTIVE BOX
ADVANCED HEART FAILURE - PROGRESS NOTE  [address] [place] [address]  Office Phone: [phone]/Fax: [phone]  Service/On Call Phone [phone]  _______________________________________________________________________________________________________    Subjective:  - NAEO  - Patient states she feels well with no complaints and would like to go home. She has been ambulating with the walker with no issues.    Medications:  acetaminophen     Tablet .. 975 milliGRAM(s) Oral every 8 hours  aluminum hydroxide/magnesium hydroxide/simethicone Suspension 30 milliLiter(s) Oral every 4 hours PRN  aspirin  chewable 81 milliGRAM(s) Oral daily  atorvastatin 80 milliGRAM(s) Oral at bedtime  benzocaine/menthol Lozenge 1 Lozenge Oral every 2 hours PRN  buMETAnide 4 milliGRAM(s) Oral two times a day  chlorhexidine 2% Cloths 1 Application(s) Topical <User Schedule>  dextrose 5%. 1000 milliLiter(s) IV Continuous <Continuous>  dextrose 5%. 1000 milliLiter(s) IV Continuous <Continuous>  dextrose 50% Injectable 25 Gram(s) IV Push once  dextrose 50% Injectable 12.5 Gram(s) IV Push once  dextrose 50% Injectable 25 Gram(s) IV Push once  dextrose Oral Gel 15 Gram(s) Oral once PRN  gabapentin 400 milliGRAM(s) Oral daily  glucagon  Injectable 1 milliGRAM(s) IntraMuscular once  hydrALAZINE 25 milliGRAM(s) Oral three times a day  insulin glargine Injectable (LANTUS) 5 Unit(s) SubCutaneous at bedtime  insulin lispro (ADMELOG) corrective regimen sliding scale   SubCutaneous three times a day before meals  isosorbide   dinitrate Tablet (ISORDIL) 20 milliGRAM(s) Oral three times a day  lidocaine   4% Patch 1 Patch Transdermal every 24 hours  loperamide 2 milliGRAM(s) Oral two times a day PRN  magnesium oxide 400 milliGRAM(s) Oral two times a day with meals  melatonin 3 milliGRAM(s) Oral at bedtime  methocarbamol 500 milliGRAM(s) Oral two times a day  metoprolol succinate ER 50 milliGRAM(s) Oral at bedtime  ondansetron Injectable 4 milliGRAM(s) IV Push every 6 hours PRN  pantoprazole    Tablet 40 milliGRAM(s) Oral before breakfast  potassium chloride    Tablet ER 40 milliEquivalent(s) Oral daily  sodium bicarbonate 1300 milliGRAM(s) Oral three times a day  sodium chloride 0.65% Nasal 1 Spray(s) Both Nostrils two times a day  ticagrelor 90 milliGRAM(s) Oral every 12 hours      ICU Vital Signs Last 24 Hrs  T(C): 36.7, Max: 37 ( @ 00:00)  HR: 79 (76 - 83)  BP: 117/69 (100/60 - 117/69)  BP(mean): --  ABP: --  ABP(mean): --  RR: 18 (16 - 18)  SpO2: 98% (95% - 98%)    Weight in k.5 (25)  Weight in k.4 (25)      I&O's Summary Last 24 Hrs    IN: 420 mL / OUT: 1400 mL / NET: -980 mL      Tele: BiV-paced    Physical Exam:    General: No distress. Comfortable.  Neck: JVP not elevated.  Respiratory: Clear to auscultation bilaterally  CV: RRR. Normal S1 and S2. No murmurs, rub, or gallops. Radial pulses normal.  Abdomen: Soft, non-distended, non-tender  Extremities: Warm, no edema  Neurology: Non-focal, alert and oriented times three.   Psych: Affect normal    Labs:    ( 25 @ 05:45 )               9.2    7.66  )--------( 379                  28.2     ( 25 @ 11:05 )     132  |  90  |  62.2  ---------------------<  177  4.7  |  25.0  |  2.39    Ca 8.6  Phos x   Mg 2.2    ( 25 @ 05:45 )  TPro  6.9  /  Alb  3.4  /  TBili  0.4  /  DBili  x   /  AST  18  /  ALT  6   /  AlkPhos  120    ( 25 @ 16:15 )  TropHS 118   / CK x     / CKMB x      ( 25 @ 10:04 )  TropHS 140   / CK x     / CKMB x

## 2025-05-08 NOTE — PROGRESS NOTE ADULT - ASSESSMENT
Gen Cards: Dr. Allen  HF: Dr. Polanco  IC: Dr. Bajwa    73 y/o female with PMH of ICM/HFrEF (LVEF 35-40%), CAD s/p PCI, Mobitz II s/p PPM (MD NereydaT), HTN, HLD, PAD with chronic right LE wound, DM2, and CKD3, who was BIBA from CHF clinic due to hypervolemia, weight gain of ~20 lbs in the past 9 days despite escalation of PO diuretics.      She was recently admitted on 3/19/25 with c/o CP and found to have NSTEMI. She underwent LHC on 3/21 which showed mid-distal LAD severe stenosis, OM1 and OM2 severe stenosis, mid ISR 50%, distal ISR 99%, s/p PCI with 1 ZEINAB to RCA.  On 3/22 she had a fever with leukocytosis and her blood cultures were positive for E. Coli/ESBL. Her CT A/P showed left pyelonephritis. She also had an BRADY for which her Entresto was held. She was discharged home 3/31 and her CardioMEMS was not at goal and had been rising. She was advised to increase Torsemide 40 mg daily to twice daily on 4/4. Despite this, she has continued to gain weight and reports poor UOP response.     Admission labwork: Na 125, K 4.6, BUN 37.7, Cr 1.94, eGFR 27, Mg 2.3, proBNP 29882!  CXR: increased interstitial markings bilaterally, cardiomegaly.   On 4/10 she developed a fever, tmax 100.4F. RVP negative, empirically started on IV Zosyn.  4/13 s/p right knee fluid aspiration (s/p recent trauma/fall).  4/15 initiated on continuous Bumex infusion for inadequate response to intermittent dosing.  4/21: Received 1 dose of metolazone 5 mg and Bumex gtt switched to 4 mg IV BID.  4/24: s/p CRT-D  5/5: Started on  @ 2.5 mcg/kg/min over weekend.   5/6: No significant improvement in renal function w/  inotrope assisted diuresis. DC . Switch to oral Bumex w/ close monitoring of renal fx.     Prior Cardiac Testing:  RHC 4/22/25: RA 18, RV 63/18, PA 63/25/37, PCW 22, PA sat 37%, Jose CO/CI 2.5/1.5, Thermo CO 2.5, SVR 2800 dsc, PVR 4.5 WILLARD. No CardioMEMS recalibration needed.     TTE 4/21/25: LVEF 20-25%, LVIDd 4.3 cm, LVOT VTI 12.6 cm, grade 2 DD, mild RVE with mild RV dysfunction, mild-mod AS, mod MR/TR, mild NH, est PASP 57 mmHg, est RAP 8 mmHg.    TTE 3/21/25: LVEF 25-30%, grade II DD, normal RV size/function, severe LAE, moderate MR, mild TR, PASP 58 mmHg ( RAP 8 mmHg), no evidence of LV thrombus, multiple segmental abnormalities/regional WMA.    CardioMEMS PAD (goal 20-22):  5/8: 20  5/7: 17  5/6: 22-23  5/4: 25 5/2: 27-28 5/1: 24  4/30: 26  4/29: 24  4/28: 26  4/25: 24  4/22: 29-30  4/20: 31  4/18: 27  4/17: 27 (in chair ~30 degrees).  4/14: 30-33  4/11: 32  4/9: 32  3/27: 26  3/24: 23  3/21: 22

## 2025-05-08 NOTE — PROGRESS NOTE ADULT - NS ATTEND AMEND GEN_ALL_CORE FT
Ms. Ko is a 73 y/o German speaking female with PMH of ICM/HFrEF (LVEF 35-40%), CAD s/p PCI, Mobitz II s/p PPM (MIRNA Dawn), HTN, HLD, PAD with chronic right LE wound, DM2, and CKD3, who presented to the ED on 3/19 with c/o CP, found to have NSTEMI. She underwent PCI to her RCA for ISR on 3/21/25. Over the next couple of days she developed fever, leukocytosis and worsening renal function. Her blood cultures are positive for ESBL E.coli and CT abdomen is concerning for left pyelonephritis. She was treated with antibiotics and was discharged on oral torsemide and she was instructed to check her cardiomems reading for further diuretic titration. She presented to the HF clinic on April 9th and was found to be very volume overloaded on exam with pro BNP > 09913, weight gain of 20 pounds and cardiomems PAD 32-33. She was admitted at [facility] for further management and underwent CRT-D during this admission.     TTE: 3/21/25 LVEF 25-30%, LVEDD 4.6 cm, normal RV size and function, moderate MR, mild TR, PASP 58  TTE: LVEF 20 -25%, LVEDD 4.3 cm, mild RVE with mild RV dysfunction, moderate MR and moderate TR, PASP 57, AV dyssynchrony noted.   RHC: 4/22/25: RA 18, RV 63/18, PA 63/25/37, PCW 22, PA sat 37%, Jose CO/CI 2.5/1.5, Thermo CO 2.5, SVR 2800, PVR 4.5 WILLARD. No CardioMEMS recalibration needed.      Assessment:  Acute on chronic systolic heart failure  s/p CRT-D on 4/24/2025 for AV dyssynchrony and 100% RV pacing  Severe LV systolic function  Mild RV dysfunction  Ischemic cardiomyopathy  CAD s/p recent PCI to RCA ISR in 3/25 with LAD and LCx disease  Chronic kidney disease  Mobitz type II s/p MARRY  Hypertension  Hyperlipidemia  PAD  Diabetes mellitus type 2      Plan:   Continue bumex to oral 4 mg BID.   Increase Toprol to 50 mg daily.   Continue hydralazine to 25 mg TID and isordil 20 mg TID. Will switch to entresto once creatinine improves.   Aldactone previously stopped for hyperkalemia. No Farxiga 2/2 h/o pyelonephritis.   We will uptitrate GDMT able in the HF clinic once creatinine improves.   Plan for discharge tomorrow with follow up in HF clinic next tuesday am.   She would also need follow up with EP as outpatient.

## 2025-05-08 NOTE — PROGRESS NOTE ADULT - SUBJECTIVE AND OBJECTIVE BOX
Patient is a 74y old  Female who presents with a chief complaint of HF exacerbation (25 Apr 2025 07:46)      Patient seen and examined at bedside  this am   feeling well   no complaints     son is at the bedside translated   d/w HF team re plan discharge in am likely     ALLERGIES:  No Known Allergies    MEDICATIONS  (STANDING):  acetaminophen     Tablet .. 975 milliGRAM(s) Oral every 8 hours  aspirin  chewable 81 milliGRAM(s) Oral daily  atorvastatin 80 milliGRAM(s) Oral at bedtime  buMETAnide 4 milliGRAM(s) Oral two times a day  chlorhexidine 2% Cloths 1 Application(s) Topical <User Schedule>  dextrose 5%. 1000 milliLiter(s) (100 mL/Hr) IV Continuous <Continuous>  dextrose 5%. 1000 milliLiter(s) (50 mL/Hr) IV Continuous <Continuous>  dextrose 50% Injectable 25 Gram(s) IV Push once  dextrose 50% Injectable 12.5 Gram(s) IV Push once  dextrose 50% Injectable 25 Gram(s) IV Push once  gabapentin 400 milliGRAM(s) Oral daily  glucagon  Injectable 1 milliGRAM(s) IntraMuscular once  hydrALAZINE 25 milliGRAM(s) Oral three times a day  insulin glargine Injectable (LANTUS) 5 Unit(s) SubCutaneous at bedtime  insulin lispro (ADMELOG) corrective regimen sliding scale   SubCutaneous three times a day before meals  isosorbide   dinitrate Tablet (ISORDIL) 20 milliGRAM(s) Oral three times a day  lidocaine   4% Patch 1 Patch Transdermal every 24 hours  magnesium oxide 400 milliGRAM(s) Oral two times a day with meals  melatonin 3 milliGRAM(s) Oral at bedtime  methocarbamol 500 milliGRAM(s) Oral two times a day  metoprolol succinate ER 50 milliGRAM(s) Oral at bedtime  pantoprazole    Tablet 40 milliGRAM(s) Oral before breakfast  potassium chloride    Tablet ER 40 milliEquivalent(s) Oral daily  sodium bicarbonate 1300 milliGRAM(s) Oral three times a day  sodium chloride 0.65% Nasal 1 Spray(s) Both Nostrils two times a day  ticagrelor 90 milliGRAM(s) Oral every 12 hours    MEDICATIONS  (PRN):  aluminum hydroxide/magnesium hydroxide/simethicone Suspension 30 milliLiter(s) Oral every 4 hours PRN Dyspepsia  benzocaine/menthol Lozenge 1 Lozenge Oral every 2 hours PRN Sore Throat  dextrose Oral Gel 15 Gram(s) Oral once PRN Blood Glucose LESS THAN 70 milliGRAM(s)/deciliter  loperamide 2 milliGRAM(s) Oral two times a day PRN Diarrhea  ondansetron Injectable 4 milliGRAM(s) IV Push every 6 hours PRN Nausea and/or Vomiting    Vital Signs Last 24 Hrs  T(F): 98 (08 May 2025 12:40), Max: 98.6 (08 May 2025 00:00)  HR: 79 (08 May 2025 12:40) (76 - 83)  BP: 117/69 (08 May 2025 12:40) (100/60 - 117/69)  RR: 18 (08 May 2025 12:40) (16 - 18)  SpO2: 98% (08 May 2025 12:40) (95% - 98%)  I&O's Summary    07 May 2025 07:01  -  08 May 2025 07:00  --------------------------------------------------------  IN: 420 mL / OUT: 1400 mL / NET: -980 mL        PHYSICAL EXAM:  General: awake   Neck: supple   Lungs: CTA   Cardio: RRR, S1/S2, No murmur  Abdomen: Soft, Nontender, Nondistended; Bowel sounds present  Extremities: No calf tenderness, No pitting edema    LABS:                        9.2    7.66  )-----------( 379      ( 07 May 2025 05:45 )             28.2     05-08    132  |  90  |  62.2  ----------------------------<  177  4.7   |  25.0  |  2.39    Ca    8.6      08 May 2025 11:05  Mg     2.2     05-08    TPro  6.9  /  Alb  3.4  /  TBili  0.4  /  DBili  x   /  AST  18  /  ALT  6   /  AlkPhos  120  05-07                    03-22 Chol 120 mg/dL LDL -- HDL 34 mg/dL Trig 154 mg/dL              POCT Blood Glucose.: 195 mg/dL (08 May 2025 12:24)  POCT Blood Glucose.: 193 mg/dL (08 May 2025 08:05)  POCT Blood Glucose.: 204 mg/dL (07 May 2025 21:19)  POCT Blood Glucose.: 204 mg/dL (07 May 2025 17:27)      Urinalysis Basic - ( 08 May 2025 11:05 )    Color: x / Appearance: x / SG: x / pH: x  Gluc: 177 mg/dL / Ketone: x  / Bili: x / Urobili: x   Blood: x / Protein: x / Nitrite: x   Leuk Esterase: x / RBC: x / WBC x   Sq Epi: x / Non Sq Epi: x / Bacteria: x        Culture - Urine (collected 04 May 2025 10:00)  Source: Clean Catch Clean Catch (Midstream)  Final Report (07 May 2025 14:55):    10,000 - 49,000 CFU/mL Escherichia coli ESBL    10,000 - 49,000 CFU/mL Enterococcus faecium (vancomycin resistant)  Organism: Escherichia coli ESBL  Enterococcus faecium (vancomycin resistant) (07 May 2025 14:55)  Organism: Enterococcus faecium (vancomycin resistant) (07 May 2025 14:55)      -  Levofloxacin: R >4      -  Nitrofurantoin: S <=32 Should not be used to treat pyelonephritis.      -  Daptomycin: SDD 4 The breakpoint for SDD (susceptible dose dependent)is based on a dosage regimen of 8-12 mg/kg administered every 24 h in adults and is intended for serious infections due to E. faecium. Consultation with an infectious diseases specialist is recommended.      -  Linezolid: S 2      -  Vancomycin: R >16      -  Ciprofloxacin: R >2      -  Ampicillin: R >8 Predicts results to ampicillin/sulbactam, amoxacillin-clavulanate and  piperacillin-tazobactam.      -  Tetracycline: R >8      Method Type: RENNY  Organism: Escherichia coli ESBL (07 May 2025 14:55)      -  Levofloxacin: R >4      -  Tobramycin: S <=2      -  Nitrofurantoin: S <=32 Should not be used to treat pyelonephritis      -  Aztreonam: R >16      -  Gentamicin: S <=2      -  Cefazolin: R >16 For uncomplicated UTI with K. pneumoniae, E. coli, or P. mirablis: RENNY <=16 is sensitive and RENNY >=32 is resistant. This also predicts results for oral agents cefaclor, cefdinir, cefpodoxime, cefprozil, cefuroxime axetil, cephalexin and locarbef for uncomplicated UTI. Note that some isolates may be susceptible to these agents while testing resistant to cefazolin.      -  Cefepime: R >16      -  Piperacillin/Tazobactam: R 64      -  Ciprofloxacin: R >2      -  Imipenem: S <=1      -  Ceftriaxone: R >32      -  Ampicillin: R >16 These ampicillin results predict results for amoxicillin      Method Type: RENNY      -  Meropenem: S <=1      -  Ampicillin/Sulbactam: R >16/8      -  Cefuroxime: R >16      -  Trimethoprim/Sulfamethoxazole: R >2/38      -  Tigecycline: S <=2 Interpretations based on FDA breakpoints      -  Ertapenem: S <=0.5        RADIOLOGY & ADDITIONAL TESTS:

## 2025-05-08 NOTE — PROGRESS NOTE ADULT - SUBJECTIVE AND OBJECTIVE BOX
NEPHROLOGY INTERVAL HPI/OVERNIGHT EVENTS:    No new events.    MEDICATIONS  (STANDING):  acetaminophen     Tablet .. 975 milliGRAM(s) Oral every 8 hours  aspirin  chewable 81 milliGRAM(s) Oral daily  atorvastatin 80 milliGRAM(s) Oral at bedtime  buMETAnide 4 milliGRAM(s) Oral <User Schedule>  dextrose 5%. 1000 milliLiter(s) (100 mL/Hr) IV Continuous <Continuous>  dextrose 5%. 1000 milliLiter(s) (50 mL/Hr) IV Continuous <Continuous>  dextrose 50% Injectable 25 Gram(s) IV Push once  dextrose 50% Injectable 12.5 Gram(s) IV Push once  dextrose 50% Injectable 25 Gram(s) IV Push once  ertapenem  IVPB 500 milliGRAM(s) IV Intermittent every 24 hours  gabapentin 400 milliGRAM(s) Oral daily  glucagon  Injectable 1 milliGRAM(s) IntraMuscular once  hydrALAZINE 37.5 milliGRAM(s) Oral three times a day  insulin glargine Injectable (LANTUS) 5 Unit(s) SubCutaneous at bedtime  insulin lispro (ADMELOG) corrective regimen sliding scale   SubCutaneous three times a day before meals  isosorbide   dinitrate Tablet (ISORDIL) 20 milliGRAM(s) Oral three times a day  lidocaine   4% Patch 1 Patch Transdermal every 24 hours  melatonin 3 milliGRAM(s) Oral at bedtime  methocarbamol 500 milliGRAM(s) Oral two times a day  pantoprazole    Tablet 40 milliGRAM(s) Oral before breakfast  potassium chloride    Tablet ER 40 milliEquivalent(s) Oral daily  sodium bicarbonate 1300 milliGRAM(s) Oral three times a day  sodium chloride 0.65% Nasal 1 Spray(s) Both Nostrils two times a day  ticagrelor 90 milliGRAM(s) Oral every 12 hours    MEDICATIONS  (PRN):  aluminum hydroxide/magnesium hydroxide/simethicone Suspension 30 milliLiter(s) Oral every 4 hours PRN Dyspepsia  benzocaine/menthol Lozenge 1 Lozenge Oral every 2 hours PRN Sore Throat  dextrose Oral Gel 15 Gram(s) Oral once PRN Blood Glucose LESS THAN 70 milliGRAM(s)/deciliter  loperamide 2 milliGRAM(s) Oral two times a day PRN Diarrhea  ondansetron Injectable 4 milliGRAM(s) IV Push every 6 hours PRN Nausea and/or Vomiting      Allergies    No Known Allergies        Vital Signs Last 24 Hrs  T(C): 36.7 (08 May 2025 07:24), Max: 37 (08 May 2025 00:00)  T(F): 98 (08 May 2025 07:24), Max: 98.6 (08 May 2025 00:00)  HR: 76 (08 May 2025 07:24) (76 - 83)  BP: 100/60 (08 May 2025 07:24) (100/60 - 112/64)  BP(mean): 77 (07 May 2025 12:29) (77 - 77)  RR: 18 (08 May 2025 07:24) (16 - 18)  SpO2: 95% (08 May 2025 07:24) (95% - 98%)    Parameters below as of 08 May 2025 07:24  Patient On (Oxygen Delivery Method): room air      T(C): 36.7 (06 May 2025 12:31), Max: 37.1 (05 May 2025 20:45)  T(F): 98 (06 May 2025 12:31), Max: 98.7 (05 May 2025 20:45)  HR: 75 (06 May 2025 12:31) (71 - 86)  BP: 102/59 (06 May 2025 12:31) (96/60 - 166/66)  BP(mean): 74 (06 May 2025 12:31) (74 - 74)  RR: 18 (06 May 2025 12:31) (16 - 18)  SpO2: 96% (06 May 2025 12:31) (95% - 100%)    Parameters below as of 06 May 2025 12:31  Patient On (Oxygen Delivery Method): room air      Daily     Daily Weight in k.6 (06 May 2025 08:42)    PHYSICAL EXAM:    GENERAL: OOB in  chair, several  family members  present  HEENT: Wnl  ENMT:  Moist mucous membranes  NECK: Supple, No JVD  NERVOUS SYSTEM:  Alert & Oriented   CHEST/LUNG: Diminished BS noted, no 02  HEART: Regular rate and rhythm; No rub, 1/6  systolic  murmur  ABDOMEN: Soft, Nontender, +BS  EXTREMITIES: + dependent edema stable   : Neg      LABS:        130[L]  |  88[L]  |  63.7[H]  ----------------------------<  197[H]  4.5   |  25.0  |  2.45[H]    Ca    8.5      07 May 2025 05:45    TPro  6.9  /  Alb  3.4  /  TBili  0.4  /  DBili  x   /  AST  18  /  ALT  6   /  AlkPhos  120                            8.9    10.32 )-----------( 386      ( 05 May 2025 05:10 )             27.2     05-    131[L]  |  89[L]  |  62.8[H]  ----------------------------<  186[H]  4.4   |  25.0  |  2.75[H]    Ca    8.4      06 May 2025 05:55  Mg     1.8     05    TPro  6.7  /  Alb  3.2[L]  /  TBili  0.5  /  DBili  x   /  AST  16  /  ALT  <5  /  AlkPhos  138[H]  05-05      Urinalysis Basic - ( 06 May 2025 05:55 )    Color: x / Appearance: x / SG: x / pH: x  Gluc: 186 mg/dL / Ketone: x  / Bili: x / Urobili: x   Blood: x / Protein: x / Nitrite: x   Leuk Esterase: x / RBC: x / WBC x   Sq Epi: x / Non Sq Epi: x / Bacteria: x              RADIOLOGY & ADDITIONAL TESTS:

## 2025-05-08 NOTE — PROVIDER CONTACT NOTE (CRITICAL VALUE NOTIFICATION) - TEST AND RESULT REPORTED:
Urine culture 5/4, positive for ecoli, esbl, enterococcus facium 10,000-49,000
Culture 10,000-49.000 ECOLI ESBL Final result

## 2025-05-08 NOTE — PROGRESS NOTE ADULT - ASSESSMENT
74yoF with a history of coronary artery disease, heart failure, chronic kidney disease, hypertension, diabetes, and anemia who was recently hospitalized for NSTEMI with PCI and bacteremia who presented with dyspnea on exertion and weight gain. Continues on Bumex drip as per cardiology additional diuretics with metolazone / Bumex IV given 4/21. s/p repeat heart cath 4/22 with no need for MEMS recalibration however plan for upgarde to CRT-P vs CRT-D device 4/24, s/p Cardiac MR. Diuresis resumed by HF team 4/25, continue to monitor.     1-Acute on chronic systolic heart failure  HF team input appreciated   cont diuretics , improving   started bumex tid   cont  metoprolol, Imdur 60 mg daily, spironolactone, Entresto full dose, hydralazine tid     2- H/o Recurrent UTI , ESBL   wbc normal   cont iv invanz ( start date 5/3 )     3-Chronic kidney disease stage 4  - Continue to monitor while on diuresis, slight increase stable   - Nephro on board     4-Hypomagnesemia / hypokalemia / hyponatremia   improved   replace K and mg     5--Coronary artery disease  - Continue on aspirin, ticagrelor, and atorvastatin  - Recent cardiac catheterization with PCI, Brillinta dosage increased to 90 BID from 60 as per HF    6-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

## 2025-05-09 ENCOUNTER — TRANSCRIPTION ENCOUNTER (OUTPATIENT)
Age: 75
End: 2025-05-09

## 2025-05-09 VITALS — SYSTOLIC BLOOD PRESSURE: 117 MMHG | DIASTOLIC BLOOD PRESSURE: 75 MMHG | HEART RATE: 74 BPM

## 2025-05-09 LAB
ANION GAP SERPL CALC-SCNC: 15 MMOL/L — SIGNIFICANT CHANGE UP (ref 5–17)
BUN SERPL-MCNC: 67.3 MG/DL — HIGH (ref 8–20)
CALCIUM SERPL-MCNC: 9.1 MG/DL — SIGNIFICANT CHANGE UP (ref 8.4–10.5)
CHLORIDE SERPL-SCNC: 92 MMOL/L — LOW (ref 96–108)
CO2 SERPL-SCNC: 26 MMOL/L — SIGNIFICANT CHANGE UP (ref 22–29)
CREAT SERPL-MCNC: 2.43 MG/DL — HIGH (ref 0.5–1.3)
EGFR: 21 ML/MIN/1.73M2 — LOW
EGFR: 21 ML/MIN/1.73M2 — LOW
GLUCOSE BLDC GLUCOMTR-MCNC: 222 MG/DL — HIGH (ref 70–99)
GLUCOSE BLDC GLUCOMTR-MCNC: 228 MG/DL — HIGH (ref 70–99)
GLUCOSE SERPL-MCNC: 214 MG/DL — HIGH (ref 70–99)
POTASSIUM SERPL-MCNC: 4.3 MMOL/L — SIGNIFICANT CHANGE UP (ref 3.5–5.3)
POTASSIUM SERPL-SCNC: 4.3 MMOL/L — SIGNIFICANT CHANGE UP (ref 3.5–5.3)
SODIUM SERPL-SCNC: 133 MMOL/L — LOW (ref 135–145)

## 2025-05-09 PROCEDURE — 87040 BLOOD CULTURE FOR BACTERIA: CPT

## 2025-05-09 PROCEDURE — 99239 HOSP IP/OBS DSCHRG MGMT >30: CPT

## 2025-05-09 PROCEDURE — 89060 EXAM SYNOVIAL FLUID CRYSTALS: CPT

## 2025-05-09 PROCEDURE — 93005 ELECTROCARDIOGRAM TRACING: CPT

## 2025-05-09 PROCEDURE — 89051 BODY FLUID CELL COUNT: CPT

## 2025-05-09 PROCEDURE — 87070 CULTURE OTHR SPECIMN AEROBIC: CPT

## 2025-05-09 PROCEDURE — 84100 ASSAY OF PHOSPHORUS: CPT

## 2025-05-09 PROCEDURE — 97163 PT EVAL HIGH COMPLEX 45 MIN: CPT

## 2025-05-09 PROCEDURE — 85652 RBC SED RATE AUTOMATED: CPT

## 2025-05-09 PROCEDURE — 99285 EMERGENCY DEPT VISIT HI MDM: CPT

## 2025-05-09 PROCEDURE — 75561 CARDIAC MRI FOR MORPH W/DYE: CPT

## 2025-05-09 PROCEDURE — 86900 BLOOD TYPING SEROLOGIC ABO: CPT

## 2025-05-09 PROCEDURE — 82436 ASSAY OF URINE CHLORIDE: CPT

## 2025-05-09 PROCEDURE — 84484 ASSAY OF TROPONIN QUANT: CPT

## 2025-05-09 PROCEDURE — 71046 X-RAY EXAM CHEST 2 VIEWS: CPT

## 2025-05-09 PROCEDURE — 33208 INSRT HEART PM ATRIAL & VENT: CPT

## 2025-05-09 PROCEDURE — 84300 ASSAY OF URINE SODIUM: CPT

## 2025-05-09 PROCEDURE — C1889: CPT

## 2025-05-09 PROCEDURE — 83880 ASSAY OF NATRIURETIC PEPTIDE: CPT

## 2025-05-09 PROCEDURE — 97530 THERAPEUTIC ACTIVITIES: CPT

## 2025-05-09 PROCEDURE — 87086 URINE CULTURE/COLONY COUNT: CPT

## 2025-05-09 PROCEDURE — C1882: CPT

## 2025-05-09 PROCEDURE — C1777: CPT

## 2025-05-09 PROCEDURE — 84540 ASSAY OF URINE/UREA-N: CPT

## 2025-05-09 PROCEDURE — C8929: CPT

## 2025-05-09 PROCEDURE — 97116 GAIT TRAINING THERAPY: CPT

## 2025-05-09 PROCEDURE — 0225U NFCT DS DNA&RNA 21 SARSCOV2: CPT

## 2025-05-09 PROCEDURE — 99233 SBSQ HOSP IP/OBS HIGH 50: CPT

## 2025-05-09 PROCEDURE — C1769: CPT

## 2025-05-09 PROCEDURE — 87186 SC STD MICRODIL/AGAR DIL: CPT

## 2025-05-09 PROCEDURE — 84145 PROCALCITONIN (PCT): CPT

## 2025-05-09 PROCEDURE — 83935 ASSAY OF URINE OSMOLALITY: CPT

## 2025-05-09 PROCEDURE — 86901 BLOOD TYPING SEROLOGIC RH(D): CPT

## 2025-05-09 PROCEDURE — 87205 SMEAR GRAM STAIN: CPT

## 2025-05-09 PROCEDURE — 85730 THROMBOPLASTIN TIME PARTIAL: CPT

## 2025-05-09 PROCEDURE — 84133 ASSAY OF URINE POTASSIUM: CPT

## 2025-05-09 PROCEDURE — 71045 X-RAY EXAM CHEST 1 VIEW: CPT

## 2025-05-09 PROCEDURE — 73700 CT LOWER EXTREMITY W/O DYE: CPT | Mod: MC

## 2025-05-09 PROCEDURE — 36415 COLL VENOUS BLD VENIPUNCTURE: CPT

## 2025-05-09 PROCEDURE — 83735 ASSAY OF MAGNESIUM: CPT

## 2025-05-09 PROCEDURE — 93975 VASCULAR STUDY: CPT

## 2025-05-09 PROCEDURE — 80048 BASIC METABOLIC PNL TOTAL CA: CPT

## 2025-05-09 PROCEDURE — 83550 IRON BINDING TEST: CPT

## 2025-05-09 PROCEDURE — 87640 STAPH A DNA AMP PROBE: CPT

## 2025-05-09 PROCEDURE — 82570 ASSAY OF URINE CREATININE: CPT

## 2025-05-09 PROCEDURE — C1894: CPT

## 2025-05-09 PROCEDURE — 87641 MR-STAPH DNA AMP PROBE: CPT

## 2025-05-09 PROCEDURE — 85025 COMPLETE CBC W/AUTO DIFF WBC: CPT

## 2025-05-09 PROCEDURE — C1892: CPT

## 2025-05-09 PROCEDURE — 83036 HEMOGLOBIN GLYCOSYLATED A1C: CPT

## 2025-05-09 PROCEDURE — P9047: CPT

## 2025-05-09 PROCEDURE — C1898: CPT

## 2025-05-09 PROCEDURE — T1013: CPT

## 2025-05-09 PROCEDURE — 87999 UNLISTED MICROBIOLOGY PX: CPT

## 2025-05-09 PROCEDURE — 83540 ASSAY OF IRON: CPT

## 2025-05-09 PROCEDURE — 73562 X-RAY EXAM OF KNEE 3: CPT

## 2025-05-09 PROCEDURE — 85610 PROTHROMBIN TIME: CPT

## 2025-05-09 PROCEDURE — 83605 ASSAY OF LACTIC ACID: CPT

## 2025-05-09 PROCEDURE — 87507 IADNA-DNA/RNA PROBE TQ 12-25: CPT

## 2025-05-09 PROCEDURE — 87075 CULTR BACTERIA EXCEPT BLOOD: CPT

## 2025-05-09 PROCEDURE — 86850 RBC ANTIBODY SCREEN: CPT

## 2025-05-09 PROCEDURE — 87077 CULTURE AEROBIC IDENTIFY: CPT

## 2025-05-09 PROCEDURE — 82010 KETONE BODYS QUAN: CPT

## 2025-05-09 PROCEDURE — 82962 GLUCOSE BLOOD TEST: CPT

## 2025-05-09 PROCEDURE — 85027 COMPLETE CBC AUTOMATED: CPT

## 2025-05-09 PROCEDURE — 87015 SPECIMEN INFECT AGNT CONCNTJ: CPT

## 2025-05-09 PROCEDURE — 84466 ASSAY OF TRANSFERRIN: CPT

## 2025-05-09 PROCEDURE — 84156 ASSAY OF PROTEIN URINE: CPT

## 2025-05-09 PROCEDURE — 81001 URINALYSIS AUTO W/SCOPE: CPT

## 2025-05-09 PROCEDURE — 80053 COMPREHEN METABOLIC PANEL: CPT

## 2025-05-09 PROCEDURE — 82728 ASSAY OF FERRITIN: CPT

## 2025-05-09 PROCEDURE — 93451 RIGHT HEART CATH: CPT

## 2025-05-09 PROCEDURE — 86140 C-REACTIVE PROTEIN: CPT

## 2025-05-09 RX ORDER — ISOSORBDIE DINITRATE 30 MG/1
1 TABLET ORAL
Qty: 90 | Refills: 0
Start: 2025-05-09 | End: 2025-06-07

## 2025-05-09 RX ORDER — ACETAMINOPHEN 500 MG/5ML
3 LIQUID (ML) ORAL
Qty: 0 | Refills: 0 | DISCHARGE
Start: 2025-05-09

## 2025-05-09 RX ORDER — ATORVASTATIN CALCIUM 80 MG/1
1 TABLET, FILM COATED ORAL
Qty: 0 | Refills: 0 | DISCHARGE
Start: 2025-05-09

## 2025-05-09 RX ORDER — METOPROLOL SUCCINATE 50 MG/1
1 TABLET, EXTENDED RELEASE ORAL
Qty: 0 | Refills: 0 | DISCHARGE
Start: 2025-05-09

## 2025-05-09 RX ORDER — BUMETANIDE 1 MG/1
2 TABLET ORAL
Qty: 60 | Refills: 0
Start: 2025-05-09 | End: 2025-06-07

## 2025-05-09 RX ADMIN — Medication 1300 MILLIGRAM(S): at 06:36

## 2025-05-09 RX ADMIN — Medication 25 MILLIGRAM(S): at 06:37

## 2025-05-09 RX ADMIN — INSULIN LISPRO 4: 100 INJECTION, SOLUTION INTRAVENOUS; SUBCUTANEOUS at 08:34

## 2025-05-09 RX ADMIN — Medication 975 MILLIGRAM(S): at 16:31

## 2025-05-09 RX ADMIN — ISOSORBDIE DINITRATE 20 MILLIGRAM(S): 30 TABLET ORAL at 15:19

## 2025-05-09 RX ADMIN — Medication 40 MILLIEQUIVALENT(S): at 11:17

## 2025-05-09 RX ADMIN — Medication 1 APPLICATION(S): at 06:42

## 2025-05-09 RX ADMIN — Medication 1300 MILLIGRAM(S): at 15:18

## 2025-05-09 RX ADMIN — ISOSORBDIE DINITRATE 20 MILLIGRAM(S): 30 TABLET ORAL at 06:37

## 2025-05-09 RX ADMIN — LIDOCAINE HYDROCHLORIDE 1 PATCH: 20 JELLY TOPICAL at 11:18

## 2025-05-09 RX ADMIN — Medication 975 MILLIGRAM(S): at 07:37

## 2025-05-09 RX ADMIN — BUMETANIDE 4 MILLIGRAM(S): 1 TABLET ORAL at 06:36

## 2025-05-09 RX ADMIN — Medication 81 MILLIGRAM(S): at 11:17

## 2025-05-09 RX ADMIN — Medication 25 MILLIGRAM(S): at 15:18

## 2025-05-09 RX ADMIN — Medication 400 MILLIGRAM(S): at 10:10

## 2025-05-09 RX ADMIN — TICAGRELOR 90 MILLIGRAM(S): 90 TABLET ORAL at 06:37

## 2025-05-09 RX ADMIN — METHOCARBAMOL 500 MILLIGRAM(S): 500 TABLET, FILM COATED ORAL at 06:37

## 2025-05-09 RX ADMIN — BUMETANIDE 4 MILLIGRAM(S): 1 TABLET ORAL at 15:19

## 2025-05-09 RX ADMIN — INSULIN LISPRO 4: 100 INJECTION, SOLUTION INTRAVENOUS; SUBCUTANEOUS at 11:25

## 2025-05-09 RX ADMIN — Medication 975 MILLIGRAM(S): at 06:36

## 2025-05-09 RX ADMIN — Medication 1 SPRAY(S): at 06:36

## 2025-05-09 RX ADMIN — Medication 975 MILLIGRAM(S): at 15:18

## 2025-05-09 RX ADMIN — Medication 40 MILLIGRAM(S): at 06:37

## 2025-05-09 RX ADMIN — ISOSORBDIE DINITRATE 20 MILLIGRAM(S): 30 TABLET ORAL at 11:17

## 2025-05-09 RX ADMIN — GABAPENTIN 400 MILLIGRAM(S): 400 CAPSULE ORAL at 11:17

## 2025-05-09 NOTE — PROGRESS NOTE ADULT - SUBJECTIVE AND OBJECTIVE BOX
NEPHROLOGY INTERVAL HPI/OVERNIGHT EVENTS:    Feels better   No new events   Meds noted    ml     MEDICATIONS  (STANDING):  acetaminophen     Tablet .. 975 milliGRAM(s) Oral every 8 hours  aspirin  chewable 81 milliGRAM(s) Oral daily  atorvastatin 80 milliGRAM(s) Oral at bedtime  buMETAnide 4 milliGRAM(s) Oral two times a day  chlorhexidine 2% Cloths 1 Application(s) Topical <User Schedule>  dextrose 5%. 1000 milliLiter(s) (100 mL/Hr) IV Continuous <Continuous>  dextrose 5%. 1000 milliLiter(s) (50 mL/Hr) IV Continuous <Continuous>  dextrose 50% Injectable 25 Gram(s) IV Push once  dextrose 50% Injectable 12.5 Gram(s) IV Push once  dextrose 50% Injectable 25 Gram(s) IV Push once  gabapentin 400 milliGRAM(s) Oral daily  glucagon  Injectable 1 milliGRAM(s) IntraMuscular once  hydrALAZINE 25 milliGRAM(s) Oral three times a day  insulin glargine Injectable (LANTUS) 5 Unit(s) SubCutaneous at bedtime  insulin lispro (ADMELOG) corrective regimen sliding scale   SubCutaneous three times a day before meals  isosorbide   dinitrate Tablet (ISORDIL) 20 milliGRAM(s) Oral three times a day  lidocaine   4% Patch 1 Patch Transdermal every 24 hours  magnesium oxide 400 milliGRAM(s) Oral two times a day with meals  melatonin 3 milliGRAM(s) Oral at bedtime  methocarbamol 500 milliGRAM(s) Oral two times a day  metoprolol succinate ER 50 milliGRAM(s) Oral at bedtime  pantoprazole    Tablet 40 milliGRAM(s) Oral before breakfast  potassium chloride    Tablet ER 40 milliEquivalent(s) Oral daily  sodium bicarbonate 1300 milliGRAM(s) Oral three times a day  sodium chloride 0.65% Nasal 1 Spray(s) Both Nostrils two times a day  ticagrelor 90 milliGRAM(s) Oral every 12 hours    MEDICATIONS  (PRN):  aluminum hydroxide/magnesium hydroxide/simethicone Suspension 30 milliLiter(s) Oral every 4 hours PRN Dyspepsia  benzocaine/menthol Lozenge 1 Lozenge Oral every 2 hours PRN Sore Throat  dextrose Oral Gel 15 Gram(s) Oral once PRN Blood Glucose LESS THAN 70 milliGRAM(s)/deciliter  loperamide 2 milliGRAM(s) Oral two times a day PRN Diarrhea  ondansetron Injectable 4 milliGRAM(s) IV Push every 6 hours PRN Nausea and/or Vomiting      Allergies    No Known Allergies    Intolerances          Vital Signs Last 24 Hrs  T(C): 36.9 (09 May 2025 11:15), Max: 36.9 (08 May 2025 16:32)  T(F): 98.4 (09 May 2025 11:15), Max: 98.5 (09 May 2025 07:24)  HR: 77 (09 May 2025 11:15) (69 - 98)  BP: 115/66 (09 May 2025 11:15) (109/61 - 145/74)  BP(mean): --  RR: 18 (09 May 2025 11:15) (16 - 19)  SpO2: 97% (09 May 2025 11:15) (97% - 100%)    Parameters below as of 09 May 2025 11:15  Patient On (Oxygen Delivery Method): room air      Daily     Daily Weight in k.8 (09 May 2025 08:32)  I&O's Detail    08 May 2025 07:01  -  09 May 2025 07:00  --------------------------------------------------------  IN:    Oral Fluid: 1134 mL  Total IN: 1134 mL    OUT:    Indwelling Catheter - Urethral (mL): 800 mL    Voided (mL): 700 mL  Total OUT: 1500 mL    Total NET: -366 mL        I&O's Summary    08 May 2025 07:01  -  09 May 2025 07:00  --------------------------------------------------------  IN: 1134 mL / OUT: 1500 mL / NET: -366 mL        PHYSICAL EXAM:    HEENT: Wnl  ENMT:  Moist mucous membranes  NECK: Supple, No JVD  NERVOUS SYSTEM:  Alert & Oriented   CHEST/LUNG: Diminished BS noted, no 02  HEART: Regular rate and rhythm; No rub, 1/6  systolic  murmur  ABDOMEN: Soft, Nontender, +BS  EXTREMITIES: + dependent edema stable          133[L]  |  92[L]  |  67.3[H]  ----------------------------<  214[H]  4.3   |  26.0  |  2.43[H]    Ca    9.1      09 May 2025 05:11  Mg     2.2             Urinalysis Basic - ( 09 May 2025 05:11 )    Color: x / Appearance: x / SG: x / pH: x  Gluc: 214 mg/dL / Ketone: x  / Bili: x / Urobili: x   Blood: x / Protein: x / Nitrite: x   Leuk Esterase: x / RBC: x / WBC x   Sq Epi: x / Non Sq Epi: x / Bacteria: x          < from: US Duplex Kidneys (25 @ 18:47) >    ACC: [id number] EXAM:  US DPLX KIDNEY(IES)   ORDERED BY: VIRGINIA HINDS     PROCEDURE DATE:  2025          INTERPRETATION:  CLINICAL INFORMATION: Hypertension, renal vascular   hypertension, atherosclerosis of the renal arteries.    COMPARISON: CT abdomen and pelvis 3/28/2025    TECHNIQUE: Color and spectral Doppler evaluation of the kidneys.    FINDINGS:  Right kidney: 9.2 cm. No renal mass, hydronephrosis or calculi.  Left kidney: 9.5 cm. No renal mass, hydronephrosis or calculi.    Urinary bladder: Sierra catheter    Color and spectral Doppler reveals normal, symmetric blood flow   throughout both kidneys.  Peak aortic velocity is 63 cm/sec.  IVC/Renal Veins: Patent.    RIGHT  Renal Artery:  Peak systolic velocity is 45 cm/sec origin, 35 cm/sec proximal, 19 cm/sec   mid, 44 cm/sec distal and 22 cm/sec hilum.  Upper Segmental Artery:  RI = 0.6  Middle Segmental Artery: RI = 0.5  Lower Segmental Artery: RI = 0.8    LEFT  Renal Artery:  Peak systolic velocity is 16 cm/sec origin, 23 cm/sec proximal, 21 cm/sec   mid, 22 cm/sec distal and 24 cm/sec hilum.  Upper Segmental Artery:  RI = 0.5  Middle Segmental Artery: RI = 0.5  Lower Segmental Artery: RI = 0.5    IMPRESSION:    No evidence of a significant renal artery stenosis.      --- End of Report ---            SHWETA CYR MD; Attending Radiologist  This document has been electronically signed. May  6 2025  7:44PM    < end of copied text >      RADIOLOGY & ADDITIONAL TESTS:

## 2025-05-09 NOTE — PROGRESS NOTE ADULT - PROVIDER SPECIALTY LIST ADULT
Cardiology
Hospitalist
Internal Medicine
Internal Medicine
Nephrology
Orthopedics
Cardiology
Electrophysiology
Electrophysiology
Heart Failure
Hospitalist
Intervent Cardiology
Nephrology
Cardiology
Electrophysiology
Electrophysiology
Hospitalist
Nephrology
Cardiology
Heart Failure
Hospitalist
Hospitalist
Heart Failure
Hospitalist
Cardiology
Heart Failure
Heart Failure
Hospitalist
Hospitalist
Cardiology
Heart Failure
Cardiology
Heart Failure
Cardiology
Heart Failure

## 2025-05-09 NOTE — PROGRESS NOTE ADULT - PROBLEM SELECTOR PROBLEM 2
CAD (coronary artery disease)

## 2025-05-09 NOTE — PROGRESS NOTE ADULT - PROBLEM SELECTOR PLAN 4
- On 4/10 spiked fever with mild leukocytosis - improved.  - RVP negative. UA with trace leukocytosis. BCx 4/11 NGTD.  - Started empirically on Zosyn which she completed.   - Management per primary team.
- On 4/10 spiked fever with mild leukocytosis - improved.  - RVP negative. UA with trace leukocytosis. BCx 4/11 NGTD.  - Started empirically on Zosyn which she completed.   - Management per primary team.
- On 4/10 spiked fever with mild leukocytosis - improved.  - RVP negative. UA with trace leukocytosis. BCx 4/11 NGTD.  - Started empirically on Zosyn which she completed.   - Repeat UA with large leukocytes and Invanz started 5/3.  - Management per primary team.
- On 4/10 spiked fever with mild leukocytosis - improved.  - RVP negative. UA with trace leukocytosis. BCx 4/11 NGTD.  - Started empirically on Zosyn which she completed.   - Repeat UA with large leukocytes and Invanz started 5/3.  - Management per primary team.
- On 4/10 spiked fever with mild leukocytosis - improved.  - RVP negative. UA with trace leukocytosis. BCx 4/11 NGTD.  - Started empirically on Zosyn which she completed.   - Management per primary team.
- On 4/10 spiked fever with mild leukocytosis - improved.  - RVP negative. UA with trace leukocytosis. BCx 4/11 NGTD.  - Started empirically on Zosyn which she completed.   - Management per primary team.
- On 4/10 spiked fever with mild leukocytosis - improved.  - RVP negative. UA with trace leukocytosis. BCx 4/11 NGTD.  - Started empirically on Zosyn. Unclear if N/V is related to abx.  - Management per primary team.
- On 4/10 spiked fever with mild leukocytosis - improved.  - RVP negative. UA with trace leukocytosis. BCx 4/11 NGTD.  - Started empirically on Zosyn. Unclear if diarrhea is related to abx.  - Management per primary team.
- On 4/10 spiked fever with mild leukocytosis - improved.  - RVP negative. UA with trace leukocytosis. BCx 4/11 NGTD.  - Started empirically on Zosyn which she completed.   - Management per primary team.
- On 4/10 spiked fever with mild leukocytosis - improved.  - RVP negative. UA with trace leukocytosis. BCx 4/11 NGTD.  - Started empirically on Zosyn which she completed.   - Management per primary team.
- On 4/10 spiked fever with mild leukocytosis - improved.  - RVP negative. UA with trace leukocytosis. BCx 4/11 NGTD.  - Started empirically on Zosyn. Unclear if diarrhea is related to abx. GI PCR stool sample sent.  - Management per primary team.
- On 4/10 spiked fever with mild leukocytosis - improved.  - RVP negative. UA with trace leukocytosis. BCx 4/11 NGTD.  - Started empirically on Zosyn. Unclear if diarrhea is related to abx. GI PCR stool sample sent.  - Management per primary team.
- On 4/10 spiked fever with mild leukocytosis - improved.  - RVP negative. UA with trace leukocytosis. BCx 4/11 NGTD.  - Started empirically on Zosyn which she completed.   - Repeat UA with large leukocytes and Invanz started 5/3.  - Management per primary team.
- On 4/10 spiked fever with mild leukocytosis - improved.  - RVP negative. UA with trace leukocytosis. BCx 4/11 NGTD.  - Started empirically on Zosyn. Unclear if diarrhea is related to abx. GI PCR stool sample sent.  - Management per primary team.
- On 4/10 spiked fever with mild leukocytosis - improved.  - RVP negative. UA with trace leukocytosis. BCx 4/11 NGTD.  - Started empirically on Zosyn which she completed.   - Management per primary team.
- On 4/10 spiked fever with mild leukocytosis - improved.  - RVP negative. UA with trace leukocytosis. BCx 4/11 NGTD.  - Started empirically on Zosyn which she completed.   - Management per primary team.
- On 4/10 spiked fever with mild leukocytosis - improved.  - RVP negative. UA with trace leukocytosis. BCx 4/11 NGTD.  - Started empirically on Zosyn. Unclear if N/V is related to abx.  - Management per primary team.
- On 4/10 spiked fever with mild leukocytosis.  - RVP negative, UA with trace leukocytosis. BCx sent, results pending.  - Started empirically on Zosyn. Unclear if N/V is related to abx.  - Management per primary team.

## 2025-05-09 NOTE — PROGRESS NOTE ADULT - PROBLEM SELECTOR PLAN 1
- ICM, most recent TTE on 4/21/25 showed further decline of LVEF to 20-25%  - Serum pro-BNP > 70k on 4/21. Repeat pro-BNP 30,223.  - Clinically appears euvolemic on exam w/warm extremities.  - Her PAD on her CardioMEMS is 15 today! Will still liberalize goal to 20-22.  - Diuretics: Reduce Bumex to 4 mg PO daily.  - GDMT: Continue Toprol XL to 50 mg nightly, HDZN 25 mg TID and Isordil 20 mg TID. Aldactone previously stopped for hyperkalemia. She is now on KCl 40 meq daily. If her renal function remains stable/improves can consider switching to aldactone as outpt. No Farxiga 2/2 h/o pyelonephritis.   - Device: h/o Micra PPM with chronic . She is now s/p CRT-D on 4/24.  - Low Na, 1.5L FR diet recommended  - Please document strict I&Os and daily standing weight.  - Needs daily PT. Will refer for cardiac rehab as an outpt.  - Plan for DC home today. Will need repeat BMP, Mg, proBNP next week. Will call to arrange HFU visit for next week as well.

## 2025-05-09 NOTE — PROGRESS NOTE ADULT - NS ATTEND AMEND GEN_ALL_CORE FT
Ms. Ko is a 75 y/o Khmer speaking female with PMH of ICM/HFrEF (LVEF 35-40%), CAD s/p PCI, Mobitz II s/p PPM (MIRNA Dawn), HTN, HLD, PAD with chronic right LE wound, DM2, and CKD3, who presented to the ED on 3/19 with c/o CP, found to have NSTEMI. She underwent PCI to her RCA for ISR on 3/21/25. Over the next couple of days she developed fever, leukocytosis and worsening renal function. Her blood cultures are positive for ESBL E.coli and CT abdomen is concerning for left pyelonephritis. She was treated with antibiotics and was discharged on oral torsemide and she was instructed to check her cardiomems reading for further diuretic titration. She presented to the HF clinic on April 9th and was found to be very volume overloaded on exam with pro BNP > 63031, weight gain of 20 pounds and cardiomems PAD 32-33. She was admitted at [facility] for further management and underwent CRT-D during this admission.     TTE: 3/21/25 LVEF 25-30%, LVEDD 4.6 cm, normal RV size and function, moderate MR, mild TR, PASP 58  TTE: LVEF 20 -25%, LVEDD 4.3 cm, mild RVE with mild RV dysfunction, moderate MR and moderate TR, PASP 57, AV dyssynchrony noted.   RHC: 4/22/25: RA 18, RV 63/18, PA 63/25/37, PCW 22, PA sat 37%, Jose CO/CI 2.5/1.5, Thermo CO 2.5, SVR 2800, PVR 4.5 WILLARD. No CardioMEMS recalibration needed.      Assessment:  Acute on chronic systolic heart failure  s/p CRT-D on 4/24/2025 for AV dyssynchrony and 100% RV pacing  Severe LV systolic function  Mild RV dysfunction  Ischemic cardiomyopathy  CAD s/p recent PCI to RCA ISR in 3/25 with LAD and LCx disease  Chronic kidney disease  Mobitz type II s/p MICRA  Hypertension  Hyperlipidemia  PAD  Diabetes mellitus type 2      Plan:   Cardiomems interrogated and PAD is around 17.   Change bumex to 4 mg daily.   Continue Toprol 50 mg daily.   Continue hydralazine to 25 mg TID and isordil 20 mg TID. Will switch to entresto once creatinine improves.   Aldactone previously stopped for hyperkalemia. No Farxiga 2/2 h/o pyelonephritis.   We will uptitrate GDMT able in the HF clinic once creatinine improves.   Plan for discharge today with follow up in HF clinic next week. Labs (BMP, Mg and pro-BNP) prior to clinic visit.  She would also need follow up with EP as outpatient.

## 2025-05-09 NOTE — PROGRESS NOTE ADULT - ASSESSMENT
73 y/o F w h/o ICM/HFrEF (LVEF 35-40%), CAD s/p PCI, Mobitz II s/p PPM (MD NereydaT), HTN, HLD, PAD with chronic right LE wound, DM2, and CKD3, who was BIBA from CHF clinic due to hypervolemia, weight gain of ~20 lbs in the past 9 days    BRADY on CKD stage III vs progression of dz likely the latter  Suspect CRS diuretic effect also contributing   Serum Cr 2.5--> 2.6 --> 2.6 --> 2.5 --> 2.4  ICM (EF 25-30%) admitted with decompensated CHF--> improved  Pt clinically improved on current Rx  - s/p Bumex ggt now on po Bumex 4 mg Q day as per cardiology and accept azotemia--> good diuretic response thus far  - Entresto held , Hydralazine added   - No hydro or SHELLY on sonogram 5/6     Hyponatremia - Improved with diuresis     MA - Continue oral bicarb      Needs strict I & O s  Renally dose meds, avoid nephrotoxic agents  Monitor labs will follow

## 2025-05-09 NOTE — PROGRESS NOTE ADULT - NS ATTEND OPT1 GEN_ALL_CORE
I independently performed the documented:
I attest my time as attending is greater than 50% of the total combined time spent on qualifying patient care activities by the PA/NP and attending.

## 2025-05-09 NOTE — PROGRESS NOTE ADULT - PROBLEM SELECTOR PROBLEM 3
Fever
Fever
Stage 3 chronic kidney disease

## 2025-05-09 NOTE — DISCHARGE NOTE NURSING/CASE MANAGEMENT/SOCIAL WORK - PATIENT PORTAL LINK FT
You can access the FollowMyHealth Patient Portal offered by [facility] by registering at the following website: [email or web address]. By joining [employer]’s FollowMyHealth portal, you will also be able to view your health information using other applications (apps) compatible with our system.

## 2025-05-09 NOTE — PROGRESS NOTE ADULT - PROBLEM SELECTOR PROBLEM 1
Acute on chronic HFrEF (heart failure with reduced ejection fraction)

## 2025-05-09 NOTE — PROGRESS NOTE ADULT - ASSESSMENT
Gen Cards: Dr. Allen  HF: Dr. Polanco  IC: Dr. Bajwa    75 y/o female with PMH of ICM/HFrEF (LVEF 35-40%), CAD s/p PCI, Mobitz II s/p PPM (MD NereydaT), HTN, HLD, PAD with chronic right LE wound, DM2, and CKD3, who was BIBA from CHF clinic due to hypervolemia, weight gain of ~20 lbs in the past 9 days despite escalation of PO diuretics.      She was recently admitted on 3/19/25 with c/o CP and found to have NSTEMI. She underwent LHC on 3/21 which showed mid-distal LAD severe stenosis, OM1 and OM2 severe stenosis, mid ISR 50%, distal ISR 99%, s/p PCI with 1 ZEINAB to RCA.  On 3/22 she had a fever with leukocytosis and her blood cultures were positive for E. Coli/ESBL. Her CT A/P showed left pyelonephritis. She also had an BRADY for which her Entresto was held. She was discharged home 3/31 and her CardioMEMS was not at goal and had been rising. She was advised to increase Torsemide 40 mg daily to twice daily on 4/4. Despite this, she has continued to gain weight and reports poor UOP response.     Admission labwork: Na 125, K 4.6, BUN 37.7, Cr 1.94, eGFR 27, Mg 2.3, proBNP 06653!  CXR: increased interstitial markings bilaterally, cardiomegaly.   On 4/10 she developed a fever, tmax 100.4F. RVP negative, empirically started on IV Zosyn.  4/13 s/p right knee fluid aspiration (s/p recent trauma/fall).  4/15 initiated on continuous Bumex infusion for inadequate response to intermittent dosing.  4/21: Received 1 dose of metolazone 5 mg and Bumex gtt switched to 4 mg IV BID.  4/24: s/p CRT-D  5/5: Started on  @ 2.5 mcg/kg/min over weekend.   5/6: No significant improvement in renal function w/  inotrope assisted diuresis. DC . Switch to oral Bumex w/ close monitoring of renal fx.   5/9: Renal fx has been stable. DC planning for today.    Prior Cardiac Testing:  RHC 4/22/25: RA 18, RV 63/18, PA 63/25/37, PCW 22, PA sat 37%, Jose CO/CI 2.5/1.5, Thermo CO 2.5, SVR 2800 dsc, PVR 4.5 WILLARD. No CardioMEMS recalibration needed.     TTE 4/21/25: LVEF 20-25%, LVIDd 4.3 cm, LVOT VTI 12.6 cm, grade 2 DD, mild RVE with mild RV dysfunction, mild-mod AS, mod MR/TR, mild TN, est PASP 57 mmHg, est RAP 8 mmHg.    TTE 3/21/25: LVEF 25-30%, grade II DD, normal RV size/function, severe LAE, moderate MR, mild TR, PASP 58 mmHg ( RAP 8 mmHg), no evidence of LV thrombus, multiple segmental abnormalities/regional WMA.    CardioMEMS PAD (goal 20-22):  5/9: 15  5/8: 20  5/7: 17  5/6: 22-23  5/4: 25  5/2: 27-28  5/1: 24  4/30: 26  4/29: 24  4/28: 26  4/25: 24  4/22: 29-30  4/20: 31  4/18: 27  4/17: 27 (in chair ~30 degrees).  4/14: 30-33  4/11: 32  4/9: 32  3/27: 26  3/24: 23  3/21: 22

## 2025-05-09 NOTE — PROGRESS NOTE ADULT - SUBJECTIVE AND OBJECTIVE BOX
[facility] Advanced Heart Failure - Progress Note  [address] [place] [address]  Office Phone: [phone]/Fax: [phone]  Service/On Call Phone [phone]    Subjective/Objective: Patient feels well and denies any HF symptoms. She has ambulated w/ no concerning s/s. Eager to be discharged home today.    Medications:  acetaminophen     Tablet .. 975 milliGRAM(s) Oral every 8 hours  aluminum hydroxide/magnesium hydroxide/simethicone Suspension 30 milliLiter(s) Oral every 4 hours PRN  aspirin  chewable 81 milliGRAM(s) Oral daily  atorvastatin 80 milliGRAM(s) Oral at bedtime  benzocaine/menthol Lozenge 1 Lozenge Oral every 2 hours PRN  buMETAnide 4 milliGRAM(s) Oral two times a day  chlorhexidine 2% Cloths 1 Application(s) Topical <User Schedule>  dextrose 5%. 1000 milliLiter(s) IV Continuous <Continuous>  dextrose 5%. 1000 milliLiter(s) IV Continuous <Continuous>  dextrose 50% Injectable 25 Gram(s) IV Push once  dextrose 50% Injectable 12.5 Gram(s) IV Push once  dextrose 50% Injectable 25 Gram(s) IV Push once  dextrose Oral Gel 15 Gram(s) Oral once PRN  gabapentin 400 milliGRAM(s) Oral daily  glucagon  Injectable 1 milliGRAM(s) IntraMuscular once  hydrALAZINE 25 milliGRAM(s) Oral three times a day  insulin glargine Injectable (LANTUS) 5 Unit(s) SubCutaneous at bedtime  insulin lispro (ADMELOG) corrective regimen sliding scale   SubCutaneous three times a day before meals  isosorbide   dinitrate Tablet (ISORDIL) 20 milliGRAM(s) Oral three times a day  lidocaine   4% Patch 1 Patch Transdermal every 24 hours  loperamide 2 milliGRAM(s) Oral two times a day PRN  magnesium oxide 400 milliGRAM(s) Oral two times a day with meals  melatonin 3 milliGRAM(s) Oral at bedtime  methocarbamol 500 milliGRAM(s) Oral two times a day  metoprolol succinate ER 50 milliGRAM(s) Oral at bedtime  ondansetron Injectable 4 milliGRAM(s) IV Push every 6 hours PRN  pantoprazole    Tablet 40 milliGRAM(s) Oral before breakfast  potassium chloride    Tablet ER 40 milliEquivalent(s) Oral daily  sodium bicarbonate 1300 milliGRAM(s) Oral three times a day  sodium chloride 0.65% Nasal 1 Spray(s) Both Nostrils two times a day  ticagrelor 90 milliGRAM(s) Oral every 12 hours    Vital Signs Last 24 Hours  T(C): 36.9 (25 @ 11:15), Max: 36.9 (25 @ 16:32)  HR: 77 (25 @ 11:15) (69 - 98)  BP: 115/66 (25 @ 11:15) (109/61 - 145/74)  RR: 18 (25 @ 11:15) (16 - 19)  SpO2: 97% (25 @ 11:15) (97% - 100%)    Weight in k.8 ( 08:32)    I&O's Summary    08 May 2025 07:  -  09 May 2025 07:00  --------------------------------------------------------  IN: 1134 mL / OUT: 1500 mL / NET: -366 mL    09 May 2025 07:01  -  09 May 2025 13:19  --------------------------------------------------------  IN: 520 mL / OUT: 700 mL / NET: -180 mL    Tele: BiVP    Physical Exam:  General: In no distress.   Neck: Neck supple. JVP not elevated.   Chest: Clear to auscultation bilaterally.  CV: RRR. Normal S1 and S2. No murmurs, rub, or gallops. Warm peripherally.  PV: No LE edema noted. Pulses full/equal in all four extremities.  Abdomen: Soft, non-distended.  Skin: warm, dry.  Neurology: Alert and oriented times three. Sensation intact.  Psych: Appropriate affect.    Labs:        133[L]  |  92[L]  |  67.3[H]  ----------------------------<  214[H]  4.3   |  26.0  |  2.43[H]    Ca    9.1      09 May 2025 05:11  Mg     2.2

## 2025-05-09 NOTE — DISCHARGE NOTE NURSING/CASE MANAGEMENT/SOCIAL WORK - FINANCIAL ASSISTANCE
[facility] provides services at a reduced cost to those who are determined to be eligible through [facility]’s financial assistance program. Information regarding [facility]’s financial assistance program can be found by going to [email or web address] or by calling [phone].

## 2025-05-09 NOTE — PROGRESS NOTE ADULT - PROBLEM SELECTOR PLAN 3
- Baseline Cr ~1.6-1.7?  - Renal function has been labile throughout this admission likely due to cardiorenal syndrome. Seems to have plateaued over past 3 days ~2.4.  - Diuretic augmentation as above  - Renal duplex without evidence of SHELLY  - Avoid nephrotoxics  - Nephrology following

## 2025-05-12 NOTE — H&P ADULT - GASTROINTESTINAL DETAILS
"Maurizio is a 62 y.o. year old male    Chief Complaint   Patient presents with    Diabetes     Patient is here to follow up on his diabetes.        History of Present Illness   HPI       Review of Systems    /88 (BP Location: Right arm, Patient Position: Sitting, Cuff Size: Adult)   Pulse 67   Temp 97.1 °F (36.2 °C) (Temporal)   Ht 184.5 cm (72.65\")   Wt 82.1 kg (181 lb)   SpO2 97%   BMI 24.11 kg/m²          Physical Exam      Current Outpatient Medications:     amphetamine-dextroamphetamine XR (ADDERALL XR) 20 MG 24 hr capsule, TAKE ONE CAPSULE BY MOUTH DAILY, Disp: 30 capsule, Rfl: 0    glipizide (GLUCOTROL XL) 5 MG ER tablet, TAKE ONE TABLET BY MOUTH DAILY WITH BREAKFAST, Disp: 30 tablet, Rfl: 1    glucose blood test strip, Test blood sugar once daily, Disp: 100 each, Rfl: 12    lisinopril (PRINIVIL,ZESTRIL) 10 MG tablet, Take 1 tablet by mouth Daily., Disp: 90 tablet, Rfl: 3    metFORMIN ER (GLUCOPHAGE-XR) 500 MG 24 hr tablet, Take 2 tablets by mouth 2 (Two) Times a Day., Disp: , Rfl:     nabumetone (RELAFEN) 750 MG tablet, Take 1 tablet by mouth 2 (Two) Times a Day As Needed for Moderate Pain., Disp: 180 tablet, Rfl: 0    rosuvastatin (CRESTOR) 20 MG tablet, Take 1 tablet by mouth Daily., Disp: 90 tablet, Rfl: 3    SITagliptin (JANUVIA) 100 MG tablet, Take 1 tablet by mouth Daily., Disp: 30 tablet, Rfl: 3    tamsulosin (FLOMAX) 0.4 MG capsule 24 hr capsule, Take 1 capsule by mouth Daily., Disp: 90 capsule, Rfl: 3    Semaglutide,0.25 or 0.5MG/DOS, (OZEMPIC) 2 MG/1.5ML solution pen-injector, Inject 0.25 mg under the skin into the appropriate area as directed 1 (One) Time Per Week., Disp: 1.5 mL, Rfl: 1    sildenafil (VIAGRA) 100 MG tablet, Take 1 tablet by mouth As Needed for Erectile Dysfunction., Disp: 18 tablet, Rfl: 13     Diagnoses and all orders for this visit:    Type 2 diabetes mellitus without complication, without long-term current use of insulin  -     Semaglutide,0.25 or 0.5MG/DOS, (OZEMPIC) " 2 MG/1.5ML solution pen-injector; Inject 0.25 mg under the skin into the appropriate area as directed 1 (One) Time Per Week.             Please note that portions of this note were completed with a voice recognition program.      soft/nontender

## 2025-05-13 ENCOUNTER — NON-APPOINTMENT (OUTPATIENT)
Age: 75
End: 2025-05-13

## 2025-05-13 ENCOUNTER — APPOINTMENT (OUTPATIENT)
Dept: ELECTROPHYSIOLOGY | Facility: CLINIC | Age: 75
End: 2025-05-13
Payer: MEDICARE

## 2025-05-13 VITALS
DIASTOLIC BLOOD PRESSURE: 58 MMHG | HEIGHT: 59 IN | SYSTOLIC BLOOD PRESSURE: 122 MMHG | WEIGHT: 138 LBS | BODY MASS INDEX: 27.82 KG/M2 | HEART RATE: 60 BPM | OXYGEN SATURATION: 98 %

## 2025-05-13 DIAGNOSIS — I50.20 UNSPECIFIED SYSTOLIC (CONGESTIVE) HEART FAILURE: ICD-10-CM

## 2025-05-13 DIAGNOSIS — I44.2 ATRIOVENTRICULAR BLOCK, COMPLETE: ICD-10-CM

## 2025-05-13 DIAGNOSIS — Z95.0 PRESENCE OF CARDIAC PACEMAKER: ICD-10-CM

## 2025-05-13 PROCEDURE — 93000 ELECTROCARDIOGRAM COMPLETE: CPT | Mod: 59

## 2025-05-13 PROCEDURE — 99214 OFFICE O/P EST MOD 30 MIN: CPT

## 2025-05-13 PROCEDURE — 93289 INTERROG DEVICE EVAL HEART: CPT

## 2025-05-13 RX ORDER — TICAGRELOR 90 MG/1
90 TABLET, FILM COATED ORAL TWICE DAILY
Refills: 0 | Status: ACTIVE | COMMUNITY

## 2025-05-13 RX ORDER — HYDRALAZINE HYDROCHLORIDE 25 MG/1
25 TABLET ORAL 3 TIMES DAILY
Refills: 0 | Status: ACTIVE | COMMUNITY

## 2025-05-13 RX ORDER — PANTOPRAZOLE 40 MG/1
40 TABLET, DELAYED RELEASE ORAL DAILY
Refills: 0 | Status: ACTIVE | COMMUNITY

## 2025-05-13 RX ORDER — POTASSIUM CHLORIDE 1500 MG/1
20 TABLET, EXTENDED RELEASE ORAL DAILY
Refills: 0 | Status: ACTIVE | COMMUNITY

## 2025-05-13 RX ORDER — ACETAMINOPHEN 325 MG/1
325 TABLET ORAL EVERY 8 HOURS
Refills: 0 | Status: ACTIVE | COMMUNITY

## 2025-05-13 RX ORDER — ISOSORBIDE DINITRATE 20 MG/1
20 TABLET ORAL 3 TIMES DAILY
Refills: 0 | Status: ACTIVE | COMMUNITY

## 2025-05-13 RX ORDER — BUMETANIDE 2 MG/1
2 TABLET ORAL
Refills: 0 | Status: ACTIVE | COMMUNITY

## 2025-05-16 ENCOUNTER — APPOINTMENT (OUTPATIENT)
Dept: HEART FAILURE | Facility: CLINIC | Age: 75
End: 2025-05-16

## 2025-05-16 VITALS
HEIGHT: 59 IN | DIASTOLIC BLOOD PRESSURE: 72 MMHG | OXYGEN SATURATION: 98 % | BODY MASS INDEX: 27.82 KG/M2 | WEIGHT: 138 LBS | HEART RATE: 76 BPM | SYSTOLIC BLOOD PRESSURE: 123 MMHG

## 2025-05-16 PROCEDURE — 99214 OFFICE O/P EST MOD 30 MIN: CPT

## 2025-05-20 ENCOUNTER — NON-APPOINTMENT (OUTPATIENT)
Age: 75
End: 2025-05-20

## 2025-05-27 ENCOUNTER — RX RENEWAL (OUTPATIENT)
Age: 75
End: 2025-05-27

## 2025-05-27 ENCOUNTER — NON-APPOINTMENT (OUTPATIENT)
Age: 75
End: 2025-05-27

## 2025-05-30 ENCOUNTER — APPOINTMENT (OUTPATIENT)
Dept: HEART FAILURE | Facility: CLINIC | Age: 75
End: 2025-05-30
Payer: MEDICARE

## 2025-05-30 VITALS
DIASTOLIC BLOOD PRESSURE: 60 MMHG | OXYGEN SATURATION: 98 % | BODY MASS INDEX: 28.43 KG/M2 | SYSTOLIC BLOOD PRESSURE: 104 MMHG | WEIGHT: 141 LBS | HEIGHT: 59 IN | HEART RATE: 66 BPM

## 2025-05-30 DIAGNOSIS — I25.10 ATHEROSCLEROTIC HEART DISEASE OF NATIVE CORONARY ARTERY W/OUT ANGINA PECTORIS: ICD-10-CM

## 2025-05-30 DIAGNOSIS — Z98.61 ATHEROSCLEROTIC HEART DISEASE OF NATIVE CORONARY ARTERY W/OUT ANGINA PECTORIS: ICD-10-CM

## 2025-05-30 DIAGNOSIS — Z95.0 PRESENCE OF CARDIAC PACEMAKER: ICD-10-CM

## 2025-05-30 DIAGNOSIS — I50.20 UNSPECIFIED SYSTOLIC (CONGESTIVE) HEART FAILURE: ICD-10-CM

## 2025-05-30 DIAGNOSIS — R06.02 SHORTNESS OF BREATH: ICD-10-CM

## 2025-05-30 DIAGNOSIS — I10 ESSENTIAL (PRIMARY) HYPERTENSION: ICD-10-CM

## 2025-05-30 PROCEDURE — 99214 OFFICE O/P EST MOD 30 MIN: CPT

## 2025-05-30 RX ORDER — METOPROLOL SUCCINATE 100 MG/1
100 TABLET, EXTENDED RELEASE ORAL
Qty: 90 | Refills: 2 | Status: DISCONTINUED | COMMUNITY
Start: 2025-05-27 | End: 2025-05-30

## 2025-05-30 RX ORDER — METOPROLOL SUCCINATE 100 MG/1
100 TABLET, EXTENDED RELEASE ORAL
Qty: 90 | Refills: 1 | Status: ACTIVE | COMMUNITY

## 2025-06-02 ENCOUNTER — RX RENEWAL (OUTPATIENT)
Age: 75
End: 2025-06-02

## 2025-06-02 RX ORDER — ASPIRIN 81 MG/1
81 TABLET, CHEWABLE ORAL
Qty: 90 | Refills: 3 | Status: ACTIVE | COMMUNITY
Start: 2025-06-02 | End: 1900-01-01

## 2025-06-03 ENCOUNTER — RX RENEWAL (OUTPATIENT)
Age: 75
End: 2025-06-03

## 2025-06-04 ENCOUNTER — NON-APPOINTMENT (OUTPATIENT)
Age: 75
End: 2025-06-04

## 2025-06-06 ENCOUNTER — APPOINTMENT (OUTPATIENT)
Dept: CARDIOLOGY | Facility: CLINIC | Age: 75
End: 2025-06-06

## 2025-06-10 ENCOUNTER — APPOINTMENT (OUTPATIENT)
Dept: NEPHROLOGY | Facility: CLINIC | Age: 75
End: 2025-06-10

## 2025-06-10 VITALS
BODY MASS INDEX: 28.48 KG/M2 | WEIGHT: 141 LBS | OXYGEN SATURATION: 99 % | SYSTOLIC BLOOD PRESSURE: 144 MMHG | HEART RATE: 69 BPM | DIASTOLIC BLOOD PRESSURE: 95 MMHG

## 2025-06-10 PROCEDURE — 99214 OFFICE O/P EST MOD 30 MIN: CPT

## 2025-06-16 ENCOUNTER — NON-APPOINTMENT (OUTPATIENT)
Age: 75
End: 2025-06-16

## 2025-06-23 ENCOUNTER — NON-APPOINTMENT (OUTPATIENT)
Age: 75
End: 2025-06-23

## 2025-07-08 ENCOUNTER — APPOINTMENT (OUTPATIENT)
Dept: CARDIOLOGY | Facility: CLINIC | Age: 75
End: 2025-07-08

## 2025-07-08 VITALS — DIASTOLIC BLOOD PRESSURE: 58 MMHG | HEART RATE: 69 BPM | SYSTOLIC BLOOD PRESSURE: 110 MMHG

## 2025-07-08 VITALS
HEIGHT: 59 IN | HEART RATE: 69 BPM | BODY MASS INDEX: 28.63 KG/M2 | SYSTOLIC BLOOD PRESSURE: 88 MMHG | DIASTOLIC BLOOD PRESSURE: 58 MMHG | OXYGEN SATURATION: 95 % | WEIGHT: 142 LBS

## 2025-07-08 RX ORDER — DAPAGLIFLOZIN 10 MG/1
10 TABLET, FILM COATED ORAL DAILY
Refills: 0 | Status: ACTIVE | COMMUNITY

## 2025-07-18 ENCOUNTER — NON-APPOINTMENT (OUTPATIENT)
Age: 75
End: 2025-07-18

## 2025-07-19 NOTE — ED PROVIDER NOTE - CADM POA CENTRAL LINE
BP mildly elevated in office today, continue losartan along with regular monitoring.  
She has a history of chronic low back pain, takes gabapentin daily and working with pain mgmt for possible ablation.  
She is currently managed on Synthroid 125 mcg daily for replacement, recheck TSH.  
She is currently taking Vitamin D daily, recheck level.  
No

## 2025-07-22 ENCOUNTER — INPATIENT (INPATIENT)
Facility: HOSPITAL | Age: 75
LOS: 3 days | Discharge: HOME | DRG: 250 | End: 2025-07-26
Attending: FAMILY MEDICINE | Admitting: STUDENT IN AN ORGANIZED HEALTH CARE EDUCATION/TRAINING PROGRAM
Payer: MEDICARE

## 2025-07-22 VITALS
TEMPERATURE: 98 F | SYSTOLIC BLOOD PRESSURE: 168 MMHG | DIASTOLIC BLOOD PRESSURE: 98 MMHG | HEIGHT: 64 IN | RESPIRATION RATE: 20 BRPM | HEART RATE: 101 BPM | WEIGHT: 134.92 LBS | OXYGEN SATURATION: 95 %

## 2025-07-22 DIAGNOSIS — Z86.011 PERSONAL HISTORY OF BENIGN NEOPLASM OF THE BRAIN: Chronic | ICD-10-CM

## 2025-07-22 DIAGNOSIS — Z98.890 OTHER SPECIFIED POSTPROCEDURAL STATES: Chronic | ICD-10-CM

## 2025-07-22 DIAGNOSIS — H26.9 UNSPECIFIED CATARACT: Chronic | ICD-10-CM

## 2025-07-22 DIAGNOSIS — Z95.0 PRESENCE OF CARDIAC PACEMAKER: Chronic | ICD-10-CM

## 2025-07-22 LAB
ALBUMIN SERPL ELPH-MCNC: 4.4 G/DL — SIGNIFICANT CHANGE UP (ref 3.3–5.2)
ALP SERPL-CCNC: 131 U/L — HIGH (ref 40–120)
ALT FLD-CCNC: 12 U/L — SIGNIFICANT CHANGE UP
ANION GAP SERPL CALC-SCNC: 20 MMOL/L — HIGH (ref 5–17)
APTT BLD: 33.9 SEC — SIGNIFICANT CHANGE UP (ref 26.1–36.8)
AST SERPL-CCNC: 22 U/L — SIGNIFICANT CHANGE UP
BILIRUB SERPL-MCNC: 0.5 MG/DL — SIGNIFICANT CHANGE UP (ref 0.4–2)
BUN SERPL-MCNC: 38.7 MG/DL — HIGH (ref 8–20)
CALCIUM SERPL-MCNC: 7.2 MG/DL — LOW (ref 8.4–10.5)
CHLORIDE SERPL-SCNC: 100 MMOL/L — SIGNIFICANT CHANGE UP (ref 96–108)
CK MB CFR SERPL CALC: 6.5 NG/ML — SIGNIFICANT CHANGE UP (ref 0–6.7)
CK SERPL-CCNC: 210 U/L — HIGH (ref 25–170)
CO2 SERPL-SCNC: 15 MMOL/L — LOW (ref 22–29)
CREAT SERPL-MCNC: 1.64 MG/DL — HIGH (ref 0.5–1.3)
D DIMER BLD IA.RAPID-MCNC: 401 NG/ML DDU — HIGH
EGFR: 33 ML/MIN/1.73M2 — LOW
EGFR: 33 ML/MIN/1.73M2 — LOW
GLUCOSE SERPL-MCNC: 424 MG/DL — HIGH (ref 70–99)
HCT VFR BLD CALC: 39.8 % — SIGNIFICANT CHANGE UP (ref 34.5–45)
HGB BLD-MCNC: 13.2 G/DL — SIGNIFICANT CHANGE UP (ref 11.5–15.5)
INR BLD: 1.09 RATIO — SIGNIFICANT CHANGE UP (ref 0.85–1.16)
LIDOCAIN IGE QN: 41 U/L — SIGNIFICANT CHANGE UP (ref 22–51)
MCHC RBC-ENTMCNC: 30.3 PG — SIGNIFICANT CHANGE UP (ref 27–34)
MCHC RBC-ENTMCNC: 33.2 G/DL — SIGNIFICANT CHANGE UP (ref 32–36)
MCV RBC AUTO: 91.5 FL — SIGNIFICANT CHANGE UP (ref 80–100)
NRBC # BLD AUTO: 0 K/UL — SIGNIFICANT CHANGE UP (ref 0–0)
NRBC # FLD: 0 K/UL — SIGNIFICANT CHANGE UP (ref 0–0)
NRBC BLD AUTO-RTO: 0 /100 WBCS — SIGNIFICANT CHANGE UP (ref 0–0)
NT-PROBNP SERPL-SCNC: HIGH PG/ML (ref 0–300)
PLATELET # BLD AUTO: 335 K/UL — SIGNIFICANT CHANGE UP (ref 150–400)
PMV BLD: 9.5 FL — SIGNIFICANT CHANGE UP (ref 7–13)
POTASSIUM SERPL-MCNC: 4.8 MMOL/L — SIGNIFICANT CHANGE UP (ref 3.5–5.3)
POTASSIUM SERPL-SCNC: 4.8 MMOL/L — SIGNIFICANT CHANGE UP (ref 3.5–5.3)
PROT SERPL-MCNC: 9.2 G/DL — HIGH (ref 6.6–8.7)
PROTHROM AB SERPL-ACNC: 12.3 SEC — SIGNIFICANT CHANGE UP (ref 9.9–13.4)
RBC # BLD: 4.35 M/UL — SIGNIFICANT CHANGE UP (ref 3.8–5.2)
RBC # FLD: 13.6 % — SIGNIFICANT CHANGE UP (ref 10.3–14.5)
SODIUM SERPL-SCNC: 135 MMOL/L — SIGNIFICANT CHANGE UP (ref 135–145)
TROPONIN T, HIGH SENSITIVITY RESULT: 41 NG/L — SIGNIFICANT CHANGE UP (ref 0–51)
WBC # BLD: 11.03 K/UL — HIGH (ref 3.8–10.5)
WBC # FLD AUTO: 11.03 K/UL — HIGH (ref 3.8–10.5)

## 2025-07-22 PROCEDURE — 99285 EMERGENCY DEPT VISIT HI MDM: CPT

## 2025-07-22 PROCEDURE — 71045 X-RAY EXAM CHEST 1 VIEW: CPT | Mod: 26

## 2025-07-22 RX ORDER — METOPROLOL SUCCINATE 50 MG/1
50 TABLET, EXTENDED RELEASE ORAL DAILY
Refills: 0 | Status: DISCONTINUED | OUTPATIENT
Start: 2025-07-22 | End: 2025-07-26

## 2025-07-22 RX ORDER — INSULIN GLARGINE-YFGN 100 [IU]/ML
26 INJECTION, SOLUTION SUBCUTANEOUS AT BEDTIME
Refills: 0 | Status: DISCONTINUED | OUTPATIENT
Start: 2025-07-22 | End: 2025-07-23

## 2025-07-22 RX ORDER — ATORVASTATIN CALCIUM 80 MG/1
80 TABLET, FILM COATED ORAL AT BEDTIME
Refills: 0 | Status: DISCONTINUED | OUTPATIENT
Start: 2025-07-22 | End: 2025-07-26

## 2025-07-22 RX ORDER — ISOSORBDIE DINITRATE 30 MG/1
20 TABLET ORAL THREE TIMES A DAY
Refills: 0 | Status: DISCONTINUED | OUTPATIENT
Start: 2025-07-22 | End: 2025-07-26

## 2025-07-22 RX ORDER — GABAPENTIN 400 MG/1
400 CAPSULE ORAL ONCE
Refills: 0 | Status: COMPLETED | OUTPATIENT
Start: 2025-07-22 | End: 2025-07-22

## 2025-07-22 RX ORDER — LIDOCAINE HYDROCHLORIDE 20 MG/ML
1 JELLY TOPICAL ONCE
Refills: 0 | Status: COMPLETED | OUTPATIENT
Start: 2025-07-22 | End: 2025-07-22

## 2025-07-22 RX ADMIN — Medication 25 MILLIGRAM(S): at 23:27

## 2025-07-22 RX ADMIN — METOPROLOL SUCCINATE 50 MILLIGRAM(S): 50 TABLET, EXTENDED RELEASE ORAL at 23:27

## 2025-07-22 RX ADMIN — ISOSORBDIE DINITRATE 20 MILLIGRAM(S): 30 TABLET ORAL at 23:27

## 2025-07-22 RX ADMIN — INSULIN GLARGINE-YFGN 26 UNIT(S): 100 INJECTION, SOLUTION SUBCUTANEOUS at 23:26

## 2025-07-22 RX ADMIN — GABAPENTIN 400 MILLIGRAM(S): 400 CAPSULE ORAL at 23:27

## 2025-07-22 RX ADMIN — LIDOCAINE HYDROCHLORIDE 1 PATCH: 20 JELLY TOPICAL at 23:26

## 2025-07-22 RX ADMIN — ATORVASTATIN CALCIUM 80 MILLIGRAM(S): 80 TABLET, FILM COATED ORAL at 23:27

## 2025-07-23 ENCOUNTER — TRANSCRIPTION ENCOUNTER (OUTPATIENT)
Age: 75
End: 2025-07-23

## 2025-07-23 ENCOUNTER — RESULT REVIEW (OUTPATIENT)
Age: 75
End: 2025-07-23

## 2025-07-23 DIAGNOSIS — R07.9 CHEST PAIN, UNSPECIFIED: ICD-10-CM

## 2025-07-23 DIAGNOSIS — I21.4 NON-ST ELEVATION (NSTEMI) MYOCARDIAL INFARCTION: ICD-10-CM

## 2025-07-23 LAB
GAS PNL BLDV: SIGNIFICANT CHANGE UP
GLUCOSE BLDC GLUCOMTR-MCNC: 177 MG/DL — HIGH (ref 70–99)
GLUCOSE BLDC GLUCOMTR-MCNC: 264 MG/DL — HIGH (ref 70–99)
GLUCOSE BLDC GLUCOMTR-MCNC: 264 MG/DL — HIGH (ref 70–99)
GLUCOSE BLDC GLUCOMTR-MCNC: 271 MG/DL — HIGH (ref 70–99)
GLUCOSE BLDC GLUCOMTR-MCNC: 319 MG/DL — HIGH (ref 70–99)
TROPONIN T, HIGH SENSITIVITY RESULT: 1492 NG/L — HIGH (ref 0–51)
TROPONIN T, HIGH SENSITIVITY RESULT: 150 NG/L — HIGH (ref 0–51)

## 2025-07-23 PROCEDURE — 84484 ASSAY OF TROPONIN QUANT: CPT

## 2025-07-23 PROCEDURE — 92933 PRQ TRLML C ATHRC ST ANGIOP1: CPT | Mod: LC

## 2025-07-23 PROCEDURE — 99152 MOD SED SAME PHYS/QHP 5/>YRS: CPT

## 2025-07-23 PROCEDURE — 71045 X-RAY EXAM CHEST 1 VIEW: CPT

## 2025-07-23 PROCEDURE — 83690 ASSAY OF LIPASE: CPT

## 2025-07-23 PROCEDURE — 93454 CORONARY ARTERY ANGIO S&I: CPT | Mod: 26,59

## 2025-07-23 PROCEDURE — 82330 ASSAY OF CALCIUM: CPT

## 2025-07-23 PROCEDURE — 84295 ASSAY OF SERUM SODIUM: CPT

## 2025-07-23 PROCEDURE — 82553 CREATINE MB FRACTION: CPT

## 2025-07-23 PROCEDURE — 85018 HEMOGLOBIN: CPT

## 2025-07-23 PROCEDURE — 71275 CT ANGIOGRAPHY CHEST: CPT | Mod: 26

## 2025-07-23 PROCEDURE — 82803 BLOOD GASES ANY COMBINATION: CPT

## 2025-07-23 PROCEDURE — 36415 COLL VENOUS BLD VENIPUNCTURE: CPT

## 2025-07-23 PROCEDURE — 93005 ELECTROCARDIOGRAM TRACING: CPT

## 2025-07-23 PROCEDURE — 85610 PROTHROMBIN TIME: CPT

## 2025-07-23 PROCEDURE — 71275 CT ANGIOGRAPHY CHEST: CPT

## 2025-07-23 PROCEDURE — 84132 ASSAY OF SERUM POTASSIUM: CPT

## 2025-07-23 PROCEDURE — 82947 ASSAY GLUCOSE BLOOD QUANT: CPT

## 2025-07-23 PROCEDURE — 82550 ASSAY OF CK (CPK): CPT

## 2025-07-23 PROCEDURE — 85014 HEMATOCRIT: CPT

## 2025-07-23 PROCEDURE — 85730 THROMBOPLASTIN TIME PARTIAL: CPT

## 2025-07-23 PROCEDURE — 99222 1ST HOSP IP/OBS MODERATE 55: CPT

## 2025-07-23 PROCEDURE — 83605 ASSAY OF LACTIC ACID: CPT

## 2025-07-23 PROCEDURE — 83880 ASSAY OF NATRIURETIC PEPTIDE: CPT

## 2025-07-23 PROCEDURE — 80053 COMPREHEN METABOLIC PANEL: CPT

## 2025-07-23 PROCEDURE — 85379 FIBRIN DEGRADATION QUANT: CPT

## 2025-07-23 PROCEDURE — 99223 1ST HOSP IP/OBS HIGH 75: CPT

## 2025-07-23 PROCEDURE — 82435 ASSAY OF BLOOD CHLORIDE: CPT

## 2025-07-23 PROCEDURE — 93010 ELECTROCARDIOGRAM REPORT: CPT

## 2025-07-23 PROCEDURE — 85027 COMPLETE CBC AUTOMATED: CPT

## 2025-07-23 PROCEDURE — 82962 GLUCOSE BLOOD TEST: CPT

## 2025-07-23 PROCEDURE — 93306 TTE W/DOPPLER COMPLETE: CPT | Mod: 26

## 2025-07-23 RX ORDER — DEXTROSE 50 % IN WATER 50 %
25 SYRINGE (ML) INTRAVENOUS ONCE
Refills: 0 | Status: DISCONTINUED | OUTPATIENT
Start: 2025-07-23 | End: 2025-07-26

## 2025-07-23 RX ORDER — INSULIN LISPRO 100 U/ML
INJECTION, SOLUTION INTRAVENOUS; SUBCUTANEOUS
Refills: 0 | Status: DISCONTINUED | OUTPATIENT
Start: 2025-07-23 | End: 2025-07-24

## 2025-07-23 RX ORDER — ACETAMINOPHEN 500 MG/5ML
650 LIQUID (ML) ORAL EVERY 6 HOURS
Refills: 0 | Status: DISCONTINUED | OUTPATIENT
Start: 2025-07-23 | End: 2025-07-26

## 2025-07-23 RX ORDER — DEXTROSE 50 % IN WATER 50 %
15 SYRINGE (ML) INTRAVENOUS ONCE
Refills: 0 | Status: DISCONTINUED | OUTPATIENT
Start: 2025-07-23 | End: 2025-07-26

## 2025-07-23 RX ORDER — SODIUM CHLORIDE 9 G/1000ML
1000 INJECTION, SOLUTION INTRAVENOUS
Refills: 0 | Status: DISCONTINUED | OUTPATIENT
Start: 2025-07-23 | End: 2025-07-26

## 2025-07-23 RX ORDER — FERROUS SULFATE 137(45) MG
325 TABLET, EXTENDED RELEASE ORAL DAILY
Refills: 0 | Status: DISCONTINUED | OUTPATIENT
Start: 2025-07-23 | End: 2025-07-26

## 2025-07-23 RX ORDER — INSULIN GLARGINE-YFGN 100 [IU]/ML
20 INJECTION, SOLUTION SUBCUTANEOUS AT BEDTIME
Refills: 0 | Status: DISCONTINUED | OUTPATIENT
Start: 2025-07-23 | End: 2025-07-24

## 2025-07-23 RX ORDER — ASPIRIN 325 MG
81 TABLET ORAL DAILY
Refills: 0 | Status: DISCONTINUED | OUTPATIENT
Start: 2025-07-23 | End: 2025-07-26

## 2025-07-23 RX ORDER — ONDANSETRON HCL/PF 4 MG/2 ML
4 VIAL (ML) INJECTION EVERY 8 HOURS
Refills: 0 | Status: DISCONTINUED | OUTPATIENT
Start: 2025-07-23 | End: 2025-07-26

## 2025-07-23 RX ORDER — HEPARIN SODIUM 1000 [USP'U]/ML
3800 INJECTION INTRAVENOUS; SUBCUTANEOUS ONCE
Refills: 0 | Status: COMPLETED | OUTPATIENT
Start: 2025-07-23 | End: 2025-07-23

## 2025-07-23 RX ORDER — BUMETANIDE 1 MG/1
4 TABLET ORAL DAILY
Refills: 0 | Status: DISCONTINUED | OUTPATIENT
Start: 2025-07-23 | End: 2025-07-26

## 2025-07-23 RX ORDER — HEPARIN SODIUM 1000 [USP'U]/ML
INJECTION INTRAVENOUS; SUBCUTANEOUS
Qty: 25000 | Refills: 0 | Status: DISCONTINUED | OUTPATIENT
Start: 2025-07-23 | End: 2025-07-23

## 2025-07-23 RX ORDER — GLUCAGON 3 MG/1
1 POWDER NASAL ONCE
Refills: 0 | Status: DISCONTINUED | OUTPATIENT
Start: 2025-07-23 | End: 2025-07-26

## 2025-07-23 RX ORDER — HEPARIN SODIUM 1000 [USP'U]/ML
3800 INJECTION INTRAVENOUS; SUBCUTANEOUS EVERY 6 HOURS
Refills: 0 | Status: DISCONTINUED | OUTPATIENT
Start: 2025-07-23 | End: 2025-07-23

## 2025-07-23 RX ORDER — RANOLAZINE 1000 MG/1
500 TABLET, FILM COATED, EXTENDED RELEASE ORAL
Refills: 0 | Status: DISCONTINUED | OUTPATIENT
Start: 2025-07-23 | End: 2025-07-26

## 2025-07-23 RX ORDER — MAGNESIUM, ALUMINUM HYDROXIDE 200-200 MG
30 TABLET,CHEWABLE ORAL EVERY 4 HOURS
Refills: 0 | Status: DISCONTINUED | OUTPATIENT
Start: 2025-07-23 | End: 2025-07-26

## 2025-07-23 RX ORDER — GABAPENTIN 400 MG/1
400 CAPSULE ORAL DAILY
Refills: 0 | Status: DISCONTINUED | OUTPATIENT
Start: 2025-07-23 | End: 2025-07-26

## 2025-07-23 RX ORDER — INSULIN LISPRO 100 U/ML
INJECTION, SOLUTION INTRAVENOUS; SUBCUTANEOUS EVERY 6 HOURS
Refills: 0 | Status: DISCONTINUED | OUTPATIENT
Start: 2025-07-23 | End: 2025-07-23

## 2025-07-23 RX ORDER — TICAGRELOR 90 MG/1
90 TABLET ORAL EVERY 12 HOURS
Refills: 0 | Status: DISCONTINUED | OUTPATIENT
Start: 2025-07-23 | End: 2025-07-26

## 2025-07-23 RX ORDER — INSULIN GLARGINE-YFGN 100 [IU]/ML
13 INJECTION, SOLUTION SUBCUTANEOUS AT BEDTIME
Refills: 0 | Status: DISCONTINUED | OUTPATIENT
Start: 2025-07-23 | End: 2025-07-23

## 2025-07-23 RX ORDER — MELATONIN 5 MG
3 TABLET ORAL AT BEDTIME
Refills: 0 | Status: DISCONTINUED | OUTPATIENT
Start: 2025-07-23 | End: 2025-07-26

## 2025-07-23 RX ORDER — DEXTROSE 50 % IN WATER 50 %
12.5 SYRINGE (ML) INTRAVENOUS ONCE
Refills: 0 | Status: DISCONTINUED | OUTPATIENT
Start: 2025-07-23 | End: 2025-07-26

## 2025-07-23 RX ADMIN — ISOSORBDIE DINITRATE 20 MILLIGRAM(S): 30 TABLET ORAL at 16:05

## 2025-07-23 RX ADMIN — GABAPENTIN 400 MILLIGRAM(S): 400 CAPSULE ORAL at 16:02

## 2025-07-23 RX ADMIN — INSULIN LISPRO 4: 100 INJECTION, SOLUTION INTRAVENOUS; SUBCUTANEOUS at 16:02

## 2025-07-23 RX ADMIN — Medication 25 MILLIGRAM(S): at 21:20

## 2025-07-23 RX ADMIN — Medication 40 MILLIGRAM(S): at 09:50

## 2025-07-23 RX ADMIN — RANOLAZINE 500 MILLIGRAM(S): 1000 TABLET, FILM COATED, EXTENDED RELEASE ORAL at 17:42

## 2025-07-23 RX ADMIN — HEPARIN SODIUM 750 UNIT(S)/HR: 1000 INJECTION INTRAVENOUS; SUBCUTANEOUS at 04:18

## 2025-07-23 RX ADMIN — Medication 81 MILLIGRAM(S): at 10:20

## 2025-07-23 RX ADMIN — INSULIN LISPRO 4: 100 INJECTION, SOLUTION INTRAVENOUS; SUBCUTANEOUS at 18:02

## 2025-07-23 RX ADMIN — Medication 325 MILLIGRAM(S): at 16:05

## 2025-07-23 RX ADMIN — HEPARIN SODIUM 3800 UNIT(S): 1000 INJECTION INTRAVENOUS; SUBCUTANEOUS at 04:18

## 2025-07-23 RX ADMIN — Medication 25 MILLIGRAM(S): at 16:43

## 2025-07-23 RX ADMIN — TICAGRELOR 90 MILLIGRAM(S): 90 TABLET ORAL at 17:42

## 2025-07-23 RX ADMIN — BUMETANIDE 4 MILLIGRAM(S): 1 TABLET ORAL at 05:42

## 2025-07-23 RX ADMIN — Medication 75 MILLILITER(S): at 15:53

## 2025-07-23 RX ADMIN — INSULIN LISPRO 6: 100 INJECTION, SOLUTION INTRAVENOUS; SUBCUTANEOUS at 05:48

## 2025-07-23 RX ADMIN — ATORVASTATIN CALCIUM 80 MILLIGRAM(S): 80 TABLET, FILM COATED ORAL at 21:20

## 2025-07-23 RX ADMIN — Medication 25 MILLIGRAM(S): at 05:41

## 2025-07-23 RX ADMIN — ISOSORBDIE DINITRATE 20 MILLIGRAM(S): 30 TABLET ORAL at 05:41

## 2025-07-23 RX ADMIN — INSULIN GLARGINE-YFGN 20 UNIT(S): 100 INJECTION, SOLUTION SUBCUTANEOUS at 21:20

## 2025-07-23 RX ADMIN — INSULIN LISPRO 0: 100 INJECTION, SOLUTION INTRAVENOUS; SUBCUTANEOUS at 21:20

## 2025-07-23 RX ADMIN — TICAGRELOR 90 MILLIGRAM(S): 90 TABLET ORAL at 09:50

## 2025-07-24 LAB
ALBUMIN SERPL ELPH-MCNC: 3.8 G/DL — SIGNIFICANT CHANGE UP (ref 3.3–5.2)
ALP SERPL-CCNC: 86 U/L — SIGNIFICANT CHANGE UP (ref 40–120)
ALT FLD-CCNC: 6 U/L — SIGNIFICANT CHANGE UP
ANION GAP SERPL CALC-SCNC: 19 MMOL/L — HIGH (ref 5–17)
AST SERPL-CCNC: 59 U/L — HIGH
BILIRUB SERPL-MCNC: 0.4 MG/DL — SIGNIFICANT CHANGE UP (ref 0.4–2)
BUN SERPL-MCNC: 66 MG/DL — HIGH (ref 8–20)
CALCIUM SERPL-MCNC: 6.6 MG/DL — LOW (ref 8.4–10.5)
CHLORIDE SERPL-SCNC: 103 MMOL/L — SIGNIFICANT CHANGE UP (ref 96–108)
CO2 SERPL-SCNC: 13 MMOL/L — LOW (ref 22–29)
CREAT SERPL-MCNC: 1.96 MG/DL — HIGH (ref 0.5–1.3)
EGFR: 27 ML/MIN/1.73M2 — LOW
EGFR: 27 ML/MIN/1.73M2 — LOW
GAS PNL BLDV: SIGNIFICANT CHANGE UP
GLUCOSE BLDC GLUCOMTR-MCNC: 172 MG/DL — HIGH (ref 70–99)
GLUCOSE BLDC GLUCOMTR-MCNC: 231 MG/DL — HIGH (ref 70–99)
GLUCOSE BLDC GLUCOMTR-MCNC: 276 MG/DL — HIGH (ref 70–99)
GLUCOSE BLDC GLUCOMTR-MCNC: 310 MG/DL — HIGH (ref 70–99)
GLUCOSE SERPL-MCNC: 217 MG/DL — HIGH (ref 70–99)
HCT VFR BLD CALC: 32.9 % — LOW (ref 34.5–45)
HGB BLD-MCNC: 10.5 G/DL — LOW (ref 11.5–15.5)
MAGNESIUM SERPL-MCNC: 2.5 MG/DL — SIGNIFICANT CHANGE UP (ref 1.6–2.6)
MCHC RBC-ENTMCNC: 30.3 PG — SIGNIFICANT CHANGE UP (ref 27–34)
MCHC RBC-ENTMCNC: 31.9 G/DL — LOW (ref 32–36)
MCV RBC AUTO: 94.8 FL — SIGNIFICANT CHANGE UP (ref 80–100)
NRBC # BLD AUTO: 0 K/UL — SIGNIFICANT CHANGE UP (ref 0–0)
NRBC # FLD: 0 K/UL — SIGNIFICANT CHANGE UP (ref 0–0)
NRBC BLD AUTO-RTO: 0 /100 WBCS — SIGNIFICANT CHANGE UP (ref 0–0)
PLATELET # BLD AUTO: 281 K/UL — SIGNIFICANT CHANGE UP (ref 150–400)
PMV BLD: 9.7 FL — SIGNIFICANT CHANGE UP (ref 7–13)
POTASSIUM SERPL-MCNC: 4.9 MMOL/L — SIGNIFICANT CHANGE UP (ref 3.5–5.3)
POTASSIUM SERPL-SCNC: 4.9 MMOL/L — SIGNIFICANT CHANGE UP (ref 3.5–5.3)
PROT SERPL-MCNC: 7.4 G/DL — SIGNIFICANT CHANGE UP (ref 6.6–8.7)
RBC # BLD: 3.47 M/UL — LOW (ref 3.8–5.2)
RBC # FLD: 14.1 % — SIGNIFICANT CHANGE UP (ref 10.3–14.5)
SODIUM SERPL-SCNC: 134 MMOL/L — LOW (ref 135–145)
WBC # BLD: 12.48 K/UL — HIGH (ref 3.8–10.5)
WBC # FLD AUTO: 12.48 K/UL — HIGH (ref 3.8–10.5)

## 2025-07-24 PROCEDURE — 99232 SBSQ HOSP IP/OBS MODERATE 35: CPT

## 2025-07-24 PROCEDURE — 99233 SBSQ HOSP IP/OBS HIGH 50: CPT

## 2025-07-24 PROCEDURE — 93010 ELECTROCARDIOGRAM REPORT: CPT

## 2025-07-24 RX ORDER — RANOLAZINE 1000 MG/1
1 TABLET, FILM COATED, EXTENDED RELEASE ORAL
Qty: 60 | Refills: 0
Start: 2025-07-24 | End: 2025-08-22

## 2025-07-24 RX ORDER — ISOPROPYL ALCOHOL, BENZOCAINE .7; .06 ML/ML; ML/ML
0 SWAB TOPICAL
Qty: 100 | Refills: 1
Start: 2025-07-24

## 2025-07-24 RX ORDER — INSULIN LISPRO 100 U/ML
3 INJECTION, SOLUTION INTRAVENOUS; SUBCUTANEOUS
Qty: 1 | Refills: 0
Start: 2025-07-24 | End: 2025-08-22

## 2025-07-24 RX ORDER — INSULIN LISPRO 100 U/ML
INJECTION, SOLUTION INTRAVENOUS; SUBCUTANEOUS
Refills: 0 | Status: DISCONTINUED | OUTPATIENT
Start: 2025-07-24 | End: 2025-07-26

## 2025-07-24 RX ORDER — HEPARIN SODIUM 1000 [USP'U]/ML
5000 INJECTION INTRAVENOUS; SUBCUTANEOUS EVERY 8 HOURS
Refills: 0 | Status: DISCONTINUED | OUTPATIENT
Start: 2025-07-24 | End: 2025-07-26

## 2025-07-24 RX ORDER — SODIUM BICARBONATE 1 MEQ/ML
650 SYRINGE (ML) INTRAVENOUS
Refills: 0 | Status: DISCONTINUED | OUTPATIENT
Start: 2025-07-24 | End: 2025-07-24

## 2025-07-24 RX ORDER — TICAGRELOR 90 MG/1
1 TABLET ORAL
Qty: 60 | Refills: 2
Start: 2025-07-24 | End: 2025-10-21

## 2025-07-24 RX ORDER — INSULIN LISPRO 100 U/ML
4 INJECTION, SOLUTION INTRAVENOUS; SUBCUTANEOUS
Refills: 0 | Status: DISCONTINUED | OUTPATIENT
Start: 2025-07-24 | End: 2025-07-25

## 2025-07-24 RX ORDER — ATORVASTATIN CALCIUM 80 MG/1
1 TABLET, FILM COATED ORAL
Qty: 30 | Refills: 0
Start: 2025-07-24 | End: 2025-08-22

## 2025-07-24 RX ORDER — INSULIN GLARGINE-YFGN 100 [IU]/ML
26 INJECTION, SOLUTION SUBCUTANEOUS AT BEDTIME
Refills: 0 | Status: DISCONTINUED | OUTPATIENT
Start: 2025-07-24 | End: 2025-07-26

## 2025-07-24 RX ORDER — SODIUM BICARBONATE 1 MEQ/ML
650 SYRINGE (ML) INTRAVENOUS THREE TIMES A DAY
Refills: 0 | Status: DISCONTINUED | OUTPATIENT
Start: 2025-07-24 | End: 2025-07-26

## 2025-07-24 RX ORDER — ASPIRIN 325 MG
1 TABLET ORAL
Qty: 30 | Refills: 0
Start: 2025-07-24 | End: 2025-08-22

## 2025-07-24 RX ADMIN — ATORVASTATIN CALCIUM 80 MILLIGRAM(S): 80 TABLET, FILM COATED ORAL at 21:21

## 2025-07-24 RX ADMIN — INSULIN LISPRO 8: 100 INJECTION, SOLUTION INTRAVENOUS; SUBCUTANEOUS at 16:45

## 2025-07-24 RX ADMIN — INSULIN LISPRO 4 UNIT(S): 100 INJECTION, SOLUTION INTRAVENOUS; SUBCUTANEOUS at 18:37

## 2025-07-24 RX ADMIN — HEPARIN SODIUM 5000 UNIT(S): 1000 INJECTION INTRAVENOUS; SUBCUTANEOUS at 13:40

## 2025-07-24 RX ADMIN — INSULIN LISPRO 2: 100 INJECTION, SOLUTION INTRAVENOUS; SUBCUTANEOUS at 21:23

## 2025-07-24 RX ADMIN — ISOSORBDIE DINITRATE 20 MILLIGRAM(S): 30 TABLET ORAL at 17:45

## 2025-07-24 RX ADMIN — INSULIN LISPRO 6: 100 INJECTION, SOLUTION INTRAVENOUS; SUBCUTANEOUS at 13:37

## 2025-07-24 RX ADMIN — RANOLAZINE 500 MILLIGRAM(S): 1000 TABLET, FILM COATED, EXTENDED RELEASE ORAL at 06:04

## 2025-07-24 RX ADMIN — Medication 650 MILLIGRAM(S): at 21:21

## 2025-07-24 RX ADMIN — INSULIN GLARGINE-YFGN 26 UNIT(S): 100 INJECTION, SOLUTION SUBCUTANEOUS at 21:22

## 2025-07-24 RX ADMIN — Medication 25 MILLIGRAM(S): at 13:39

## 2025-07-24 RX ADMIN — HEPARIN SODIUM 5000 UNIT(S): 1000 INJECTION INTRAVENOUS; SUBCUTANEOUS at 21:21

## 2025-07-24 RX ADMIN — Medication 325 MILLIGRAM(S): at 13:39

## 2025-07-24 RX ADMIN — TICAGRELOR 90 MILLIGRAM(S): 90 TABLET ORAL at 06:03

## 2025-07-24 RX ADMIN — BUMETANIDE 4 MILLIGRAM(S): 1 TABLET ORAL at 06:03

## 2025-07-24 RX ADMIN — TICAGRELOR 90 MILLIGRAM(S): 90 TABLET ORAL at 17:45

## 2025-07-24 RX ADMIN — Medication 650 MILLIGRAM(S): at 16:43

## 2025-07-24 RX ADMIN — METOPROLOL SUCCINATE 50 MILLIGRAM(S): 50 TABLET, EXTENDED RELEASE ORAL at 06:03

## 2025-07-24 RX ADMIN — ISOSORBDIE DINITRATE 20 MILLIGRAM(S): 30 TABLET ORAL at 13:40

## 2025-07-24 RX ADMIN — RANOLAZINE 500 MILLIGRAM(S): 1000 TABLET, FILM COATED, EXTENDED RELEASE ORAL at 17:45

## 2025-07-24 RX ADMIN — Medication 40 MILLIGRAM(S): at 06:27

## 2025-07-24 RX ADMIN — Medication 81 MILLIGRAM(S): at 13:39

## 2025-07-24 RX ADMIN — INSULIN LISPRO 2: 100 INJECTION, SOLUTION INTRAVENOUS; SUBCUTANEOUS at 08:16

## 2025-07-24 RX ADMIN — GABAPENTIN 400 MILLIGRAM(S): 400 CAPSULE ORAL at 13:39

## 2025-07-24 RX ADMIN — Medication 25 MILLIGRAM(S): at 21:21

## 2025-07-24 RX ADMIN — Medication 25 MILLIGRAM(S): at 06:03

## 2025-07-24 RX ADMIN — ISOSORBDIE DINITRATE 20 MILLIGRAM(S): 30 TABLET ORAL at 06:04

## 2025-07-25 LAB
A1C WITH ESTIMATED AVERAGE GLUCOSE RESULT: 8.1 % — HIGH (ref 4–5.6)
ANION GAP SERPL CALC-SCNC: 16 MMOL/L — SIGNIFICANT CHANGE UP (ref 5–17)
BASOPHILS # BLD AUTO: 0.02 K/UL — SIGNIFICANT CHANGE UP (ref 0–0.2)
BASOPHILS NFR BLD AUTO: 0.2 % — SIGNIFICANT CHANGE UP (ref 0–2)
BUN SERPL-MCNC: 74.4 MG/DL — HIGH (ref 8–20)
CALCIUM SERPL-MCNC: 6.3 MG/DL — CRITICAL LOW (ref 8.4–10.5)
CHLORIDE SERPL-SCNC: 101 MMOL/L — SIGNIFICANT CHANGE UP (ref 96–108)
CHOLEST SERPL-MCNC: 125 MG/DL — SIGNIFICANT CHANGE UP
CO2 SERPL-SCNC: 16 MMOL/L — LOW (ref 22–29)
CREAT SERPL-MCNC: 2.46 MG/DL — HIGH (ref 0.5–1.3)
EGFR: 20 ML/MIN/1.73M2 — LOW
EGFR: 20 ML/MIN/1.73M2 — LOW
EOSINOPHIL # BLD AUTO: 0.02 K/UL — SIGNIFICANT CHANGE UP (ref 0–0.5)
EOSINOPHIL NFR BLD AUTO: 0.2 % — SIGNIFICANT CHANGE UP (ref 0–6)
ESTIMATED AVERAGE GLUCOSE: 186 MG/DL — HIGH (ref 68–114)
GLUCOSE BLDC GLUCOMTR-MCNC: 116 MG/DL — HIGH (ref 70–99)
GLUCOSE BLDC GLUCOMTR-MCNC: 145 MG/DL — HIGH (ref 70–99)
GLUCOSE BLDC GLUCOMTR-MCNC: 255 MG/DL — HIGH (ref 70–99)
GLUCOSE BLDC GLUCOMTR-MCNC: 257 MG/DL — HIGH (ref 70–99)
GLUCOSE BLDC GLUCOMTR-MCNC: 263 MG/DL — HIGH (ref 70–99)
GLUCOSE SERPL-MCNC: 123 MG/DL — HIGH (ref 70–99)
HCT VFR BLD CALC: 30.8 % — LOW (ref 34.5–45)
HDLC SERPL-MCNC: 44 MG/DL — LOW
HGB BLD-MCNC: 9.9 G/DL — LOW (ref 11.5–15.5)
IMM GRANULOCYTES # BLD AUTO: 0.11 K/UL — HIGH (ref 0–0.07)
IMM GRANULOCYTES NFR BLD AUTO: 1 % — HIGH (ref 0–0.9)
LDLC SERPL-MCNC: 62 MG/DL — SIGNIFICANT CHANGE UP
LIPID PNL WITH DIRECT LDL SERPL: 62 MG/DL — SIGNIFICANT CHANGE UP
LYMPHOCYTES # BLD AUTO: 1.61 K/UL — SIGNIFICANT CHANGE UP (ref 1–3.3)
LYMPHOCYTES NFR BLD AUTO: 15.2 % — SIGNIFICANT CHANGE UP (ref 13–44)
MAGNESIUM SERPL-MCNC: 2.5 MG/DL — SIGNIFICANT CHANGE UP (ref 1.6–2.6)
MCHC RBC-ENTMCNC: 30.3 PG — SIGNIFICANT CHANGE UP (ref 27–34)
MCHC RBC-ENTMCNC: 32.1 G/DL — SIGNIFICANT CHANGE UP (ref 32–36)
MCV RBC AUTO: 94.2 FL — SIGNIFICANT CHANGE UP (ref 80–100)
MONOCYTES # BLD AUTO: 0.78 K/UL — SIGNIFICANT CHANGE UP (ref 0–0.9)
MONOCYTES NFR BLD AUTO: 7.4 % — SIGNIFICANT CHANGE UP (ref 2–14)
NEUTROPHILS # BLD AUTO: 8.04 K/UL — HIGH (ref 1.8–7.4)
NEUTROPHILS NFR BLD AUTO: 76 % — SIGNIFICANT CHANGE UP (ref 43–77)
NONHDLC SERPL-MCNC: 81 MG/DL — SIGNIFICANT CHANGE UP
NRBC # BLD AUTO: 0 K/UL — SIGNIFICANT CHANGE UP (ref 0–0)
NRBC # FLD: 0 K/UL — SIGNIFICANT CHANGE UP (ref 0–0)
NRBC BLD AUTO-RTO: 0 /100 WBCS — SIGNIFICANT CHANGE UP (ref 0–0)
PHOSPHATE SERPL-MCNC: 2.5 MG/DL — SIGNIFICANT CHANGE UP (ref 2.4–4.7)
PLATELET # BLD AUTO: 260 K/UL — SIGNIFICANT CHANGE UP (ref 150–400)
PMV BLD: 9.8 FL — SIGNIFICANT CHANGE UP (ref 7–13)
POTASSIUM SERPL-MCNC: 4.3 MMOL/L — SIGNIFICANT CHANGE UP (ref 3.5–5.3)
POTASSIUM SERPL-SCNC: 4.3 MMOL/L — SIGNIFICANT CHANGE UP (ref 3.5–5.3)
RBC # BLD: 3.27 M/UL — LOW (ref 3.8–5.2)
RBC # FLD: 14 % — SIGNIFICANT CHANGE UP (ref 10.3–14.5)
SODIUM SERPL-SCNC: 133 MMOL/L — LOW (ref 135–145)
TRIGL SERPL-MCNC: 99 MG/DL — SIGNIFICANT CHANGE UP
WBC # BLD: 10.58 K/UL — HIGH (ref 3.8–10.5)
WBC # FLD AUTO: 10.58 K/UL — HIGH (ref 3.8–10.5)

## 2025-07-25 PROCEDURE — 99232 SBSQ HOSP IP/OBS MODERATE 35: CPT

## 2025-07-25 PROCEDURE — 99233 SBSQ HOSP IP/OBS HIGH 50: CPT

## 2025-07-25 RX ORDER — EPOETIN ALFA 10000 [IU]/ML
20000 SOLUTION INTRAVENOUS; SUBCUTANEOUS
Refills: 0 | Status: DISCONTINUED | OUTPATIENT
Start: 2025-07-25 | End: 2025-07-26

## 2025-07-25 RX ORDER — INSULIN LISPRO 100 U/ML
2 INJECTION, SOLUTION INTRAVENOUS; SUBCUTANEOUS
Refills: 0 | Status: DISCONTINUED | OUTPATIENT
Start: 2025-07-25 | End: 2025-07-26

## 2025-07-25 RX ORDER — CALCIUM GLUCONATE 20 MG/ML
2 INJECTION, SOLUTION INTRAVENOUS EVERY 6 HOURS
Refills: 0 | Status: COMPLETED | OUTPATIENT
Start: 2025-07-25 | End: 2025-07-25

## 2025-07-25 RX ADMIN — RANOLAZINE 500 MILLIGRAM(S): 1000 TABLET, FILM COATED, EXTENDED RELEASE ORAL at 05:37

## 2025-07-25 RX ADMIN — INSULIN LISPRO 6: 100 INJECTION, SOLUTION INTRAVENOUS; SUBCUTANEOUS at 12:00

## 2025-07-25 RX ADMIN — INSULIN LISPRO 2 UNIT(S): 100 INJECTION, SOLUTION INTRAVENOUS; SUBCUTANEOUS at 09:07

## 2025-07-25 RX ADMIN — Medication 40 MILLIGRAM(S): at 05:36

## 2025-07-25 RX ADMIN — Medication 650 MILLIGRAM(S): at 05:36

## 2025-07-25 RX ADMIN — CALCIUM GLUCONATE 200 GRAM(S): 20 INJECTION, SOLUTION INTRAVENOUS at 09:44

## 2025-07-25 RX ADMIN — ISOSORBDIE DINITRATE 20 MILLIGRAM(S): 30 TABLET ORAL at 17:40

## 2025-07-25 RX ADMIN — Medication 25 MILLIGRAM(S): at 22:55

## 2025-07-25 RX ADMIN — ATORVASTATIN CALCIUM 80 MILLIGRAM(S): 80 TABLET, FILM COATED ORAL at 22:55

## 2025-07-25 RX ADMIN — CALCIUM GLUCONATE 200 GRAM(S): 20 INJECTION, SOLUTION INTRAVENOUS at 15:58

## 2025-07-25 RX ADMIN — Medication 25 MILLIGRAM(S): at 13:57

## 2025-07-25 RX ADMIN — HEPARIN SODIUM 5000 UNIT(S): 1000 INJECTION INTRAVENOUS; SUBCUTANEOUS at 05:39

## 2025-07-25 RX ADMIN — Medication 81 MILLIGRAM(S): at 11:29

## 2025-07-25 RX ADMIN — METOPROLOL SUCCINATE 50 MILLIGRAM(S): 50 TABLET, EXTENDED RELEASE ORAL at 05:37

## 2025-07-25 RX ADMIN — INSULIN LISPRO 2 UNIT(S): 100 INJECTION, SOLUTION INTRAVENOUS; SUBCUTANEOUS at 12:00

## 2025-07-25 RX ADMIN — ISOSORBDIE DINITRATE 20 MILLIGRAM(S): 30 TABLET ORAL at 11:29

## 2025-07-25 RX ADMIN — HEPARIN SODIUM 5000 UNIT(S): 1000 INJECTION INTRAVENOUS; SUBCUTANEOUS at 22:55

## 2025-07-25 RX ADMIN — GABAPENTIN 400 MILLIGRAM(S): 400 CAPSULE ORAL at 11:29

## 2025-07-25 RX ADMIN — TICAGRELOR 90 MILLIGRAM(S): 90 TABLET ORAL at 05:37

## 2025-07-25 RX ADMIN — RANOLAZINE 500 MILLIGRAM(S): 1000 TABLET, FILM COATED, EXTENDED RELEASE ORAL at 18:11

## 2025-07-25 RX ADMIN — TICAGRELOR 90 MILLIGRAM(S): 90 TABLET ORAL at 18:11

## 2025-07-25 RX ADMIN — BUMETANIDE 4 MILLIGRAM(S): 1 TABLET ORAL at 05:38

## 2025-07-25 RX ADMIN — INSULIN LISPRO 6: 100 INJECTION, SOLUTION INTRAVENOUS; SUBCUTANEOUS at 23:57

## 2025-07-25 RX ADMIN — Medication 650 MILLIGRAM(S): at 13:57

## 2025-07-25 RX ADMIN — CALCIUM GLUCONATE 200 GRAM(S): 20 INJECTION, SOLUTION INTRAVENOUS at 23:00

## 2025-07-25 RX ADMIN — Medication 25 MILLIGRAM(S): at 05:35

## 2025-07-25 RX ADMIN — ISOSORBDIE DINITRATE 20 MILLIGRAM(S): 30 TABLET ORAL at 05:43

## 2025-07-25 RX ADMIN — INSULIN LISPRO 2 UNIT(S): 100 INJECTION, SOLUTION INTRAVENOUS; SUBCUTANEOUS at 17:36

## 2025-07-25 RX ADMIN — EPOETIN ALFA 20000 UNIT(S): 10000 SOLUTION INTRAVENOUS; SUBCUTANEOUS at 18:07

## 2025-07-25 RX ADMIN — INSULIN GLARGINE-YFGN 26 UNIT(S): 100 INJECTION, SOLUTION SUBCUTANEOUS at 22:54

## 2025-07-25 RX ADMIN — Medication 650 MILLIGRAM(S): at 22:55

## 2025-07-25 RX ADMIN — Medication 325 MILLIGRAM(S): at 11:29

## 2025-07-25 RX ADMIN — HEPARIN SODIUM 5000 UNIT(S): 1000 INJECTION INTRAVENOUS; SUBCUTANEOUS at 13:56

## 2025-07-26 ENCOUNTER — TRANSCRIPTION ENCOUNTER (OUTPATIENT)
Age: 75
End: 2025-07-26

## 2025-07-26 VITALS
OXYGEN SATURATION: 97 % | SYSTOLIC BLOOD PRESSURE: 126 MMHG | TEMPERATURE: 98 F | RESPIRATION RATE: 18 BRPM | DIASTOLIC BLOOD PRESSURE: 72 MMHG | HEART RATE: 69 BPM

## 2025-07-26 LAB
24R-OH-CALCIDIOL SERPL-MCNC: 31.6 NG/ML — SIGNIFICANT CHANGE UP
ALBUMIN SERPL ELPH-MCNC: 3.8 G/DL — SIGNIFICANT CHANGE UP (ref 3.3–5.2)
ALP SERPL-CCNC: 96 U/L — SIGNIFICANT CHANGE UP (ref 40–120)
ALT FLD-CCNC: <5 U/L — SIGNIFICANT CHANGE UP
ANION GAP SERPL CALC-SCNC: 17 MMOL/L — SIGNIFICANT CHANGE UP (ref 5–17)
AST SERPL-CCNC: 22 U/L — SIGNIFICANT CHANGE UP
BILIRUB SERPL-MCNC: 0.4 MG/DL — SIGNIFICANT CHANGE UP (ref 0.4–2)
BUN SERPL-MCNC: 62.9 MG/DL — HIGH (ref 8–20)
CA-I BLD-SCNC: 1.1 MMOL/L — LOW (ref 1.15–1.33)
CALCIUM SERPL-MCNC: 8.5 MG/DL — SIGNIFICANT CHANGE UP (ref 8.4–10.5)
CHLORIDE SERPL-SCNC: 100 MMOL/L — SIGNIFICANT CHANGE UP (ref 96–108)
CO2 SERPL-SCNC: 17 MMOL/L — LOW (ref 22–29)
CREAT SERPL-MCNC: 2.14 MG/DL — HIGH (ref 0.5–1.3)
EGFR: 24 ML/MIN/1.73M2 — LOW
EGFR: 24 ML/MIN/1.73M2 — LOW
GLUCOSE BLDC GLUCOMTR-MCNC: 150 MG/DL — HIGH (ref 70–99)
GLUCOSE BLDC GLUCOMTR-MCNC: 181 MG/DL — HIGH (ref 70–99)
GLUCOSE SERPL-MCNC: 118 MG/DL — HIGH (ref 70–99)
POTASSIUM SERPL-MCNC: 4.3 MMOL/L — SIGNIFICANT CHANGE UP (ref 3.5–5.3)
POTASSIUM SERPL-SCNC: 4.3 MMOL/L — SIGNIFICANT CHANGE UP (ref 3.5–5.3)
PROT SERPL-MCNC: 7.2 G/DL — SIGNIFICANT CHANGE UP (ref 6.6–8.7)
PTH-INTACT FLD-MCNC: 209 PG/ML — HIGH (ref 15–65)
SODIUM SERPL-SCNC: 134 MMOL/L — LOW (ref 135–145)

## 2025-07-26 PROCEDURE — 82330 ASSAY OF CALCIUM: CPT

## 2025-07-26 PROCEDURE — C1769: CPT

## 2025-07-26 PROCEDURE — C1885: CPT

## 2025-07-26 PROCEDURE — 82947 ASSAY GLUCOSE BLOOD QUANT: CPT

## 2025-07-26 PROCEDURE — 83880 ASSAY OF NATRIURETIC PEPTIDE: CPT

## 2025-07-26 PROCEDURE — 85610 PROTHROMBIN TIME: CPT

## 2025-07-26 PROCEDURE — 83735 ASSAY OF MAGNESIUM: CPT

## 2025-07-26 PROCEDURE — 82553 CREATINE MB FRACTION: CPT

## 2025-07-26 PROCEDURE — 85014 HEMATOCRIT: CPT

## 2025-07-26 PROCEDURE — 93005 ELECTROCARDIOGRAM TRACING: CPT

## 2025-07-26 PROCEDURE — 85027 COMPLETE CBC AUTOMATED: CPT

## 2025-07-26 PROCEDURE — 83970 ASSAY OF PARATHORMONE: CPT

## 2025-07-26 PROCEDURE — 93454 CORONARY ARTERY ANGIO S&I: CPT | Mod: 59

## 2025-07-26 PROCEDURE — 82435 ASSAY OF BLOOD CHLORIDE: CPT

## 2025-07-26 PROCEDURE — 84100 ASSAY OF PHOSPHORUS: CPT

## 2025-07-26 PROCEDURE — 83036 HEMOGLOBIN GLYCOSYLATED A1C: CPT

## 2025-07-26 PROCEDURE — 84132 ASSAY OF SERUM POTASSIUM: CPT

## 2025-07-26 PROCEDURE — 92933 PRQ TRLML C ATHRC ST ANGIOP1: CPT | Mod: LC

## 2025-07-26 PROCEDURE — 80048 BASIC METABOLIC PNL TOTAL CA: CPT

## 2025-07-26 PROCEDURE — 71045 X-RAY EXAM CHEST 1 VIEW: CPT

## 2025-07-26 PROCEDURE — 36415 COLL VENOUS BLD VENIPUNCTURE: CPT

## 2025-07-26 PROCEDURE — 71275 CT ANGIOGRAPHY CHEST: CPT

## 2025-07-26 PROCEDURE — 85379 FIBRIN DEGRADATION QUANT: CPT

## 2025-07-26 PROCEDURE — 99231 SBSQ HOSP IP/OBS SF/LOW 25: CPT

## 2025-07-26 PROCEDURE — 99285 EMERGENCY DEPT VISIT HI MDM: CPT

## 2025-07-26 PROCEDURE — 84484 ASSAY OF TROPONIN QUANT: CPT

## 2025-07-26 PROCEDURE — 84295 ASSAY OF SERUM SODIUM: CPT

## 2025-07-26 PROCEDURE — C1725: CPT

## 2025-07-26 PROCEDURE — 80053 COMPREHEN METABOLIC PANEL: CPT

## 2025-07-26 PROCEDURE — 85018 HEMOGLOBIN: CPT

## 2025-07-26 PROCEDURE — C8929: CPT

## 2025-07-26 PROCEDURE — 83605 ASSAY OF LACTIC ACID: CPT

## 2025-07-26 PROCEDURE — 85025 COMPLETE CBC W/AUTO DIFF WBC: CPT

## 2025-07-26 PROCEDURE — 82962 GLUCOSE BLOOD TEST: CPT

## 2025-07-26 PROCEDURE — 82550 ASSAY OF CK (CPK): CPT

## 2025-07-26 PROCEDURE — T1013: CPT

## 2025-07-26 PROCEDURE — 82803 BLOOD GASES ANY COMBINATION: CPT

## 2025-07-26 PROCEDURE — 82306 VITAMIN D 25 HYDROXY: CPT

## 2025-07-26 PROCEDURE — 83690 ASSAY OF LIPASE: CPT

## 2025-07-26 PROCEDURE — 85730 THROMBOPLASTIN TIME PARTIAL: CPT

## 2025-07-26 PROCEDURE — 96372 THER/PROPH/DIAG INJ SC/IM: CPT

## 2025-07-26 PROCEDURE — 80061 LIPID PANEL: CPT

## 2025-07-26 PROCEDURE — C1894: CPT

## 2025-07-26 PROCEDURE — 99239 HOSP IP/OBS DSCHRG MGMT >30: CPT

## 2025-07-26 PROCEDURE — C1760: CPT

## 2025-07-26 PROCEDURE — C1887: CPT

## 2025-07-26 RX ORDER — SODIUM BICARBONATE 1 MEQ/ML
1 SYRINGE (ML) INTRAVENOUS
Qty: 90 | Refills: 0
Start: 2025-07-26 | End: 2025-08-24

## 2025-07-26 RX ORDER — CALCIUM GLUCONATE 20 MG/ML
2 INJECTION, SOLUTION INTRAVENOUS ONCE
Refills: 0 | Status: COMPLETED | OUTPATIENT
Start: 2025-07-26 | End: 2025-07-26

## 2025-07-26 RX ADMIN — GABAPENTIN 400 MILLIGRAM(S): 400 CAPSULE ORAL at 11:14

## 2025-07-26 RX ADMIN — BUMETANIDE 4 MILLIGRAM(S): 1 TABLET ORAL at 05:29

## 2025-07-26 RX ADMIN — Medication 650 MILLIGRAM(S): at 14:11

## 2025-07-26 RX ADMIN — ISOSORBDIE DINITRATE 20 MILLIGRAM(S): 30 TABLET ORAL at 05:28

## 2025-07-26 RX ADMIN — METOPROLOL SUCCINATE 50 MILLIGRAM(S): 50 TABLET, EXTENDED RELEASE ORAL at 05:28

## 2025-07-26 RX ADMIN — Medication 325 MILLIGRAM(S): at 11:14

## 2025-07-26 RX ADMIN — HEPARIN SODIUM 5000 UNIT(S): 1000 INJECTION INTRAVENOUS; SUBCUTANEOUS at 05:30

## 2025-07-26 RX ADMIN — ISOSORBDIE DINITRATE 20 MILLIGRAM(S): 30 TABLET ORAL at 11:14

## 2025-07-26 RX ADMIN — TICAGRELOR 90 MILLIGRAM(S): 90 TABLET ORAL at 05:27

## 2025-07-26 RX ADMIN — INSULIN LISPRO 2 UNIT(S): 100 INJECTION, SOLUTION INTRAVENOUS; SUBCUTANEOUS at 11:19

## 2025-07-26 RX ADMIN — INSULIN LISPRO 2 UNIT(S): 100 INJECTION, SOLUTION INTRAVENOUS; SUBCUTANEOUS at 08:51

## 2025-07-26 RX ADMIN — RANOLAZINE 500 MILLIGRAM(S): 1000 TABLET, FILM COATED, EXTENDED RELEASE ORAL at 05:28

## 2025-07-26 RX ADMIN — Medication 25 MILLIGRAM(S): at 14:11

## 2025-07-26 RX ADMIN — Medication 25 MILLIGRAM(S): at 05:27

## 2025-07-26 RX ADMIN — CALCIUM GLUCONATE 200 GRAM(S): 20 INJECTION, SOLUTION INTRAVENOUS at 10:10

## 2025-07-26 RX ADMIN — Medication 650 MILLIGRAM(S): at 05:29

## 2025-07-26 RX ADMIN — Medication 40 MILLIGRAM(S): at 05:31

## 2025-07-26 RX ADMIN — INSULIN LISPRO 2: 100 INJECTION, SOLUTION INTRAVENOUS; SUBCUTANEOUS at 11:20

## 2025-07-26 RX ADMIN — Medication 81 MILLIGRAM(S): at 11:14

## 2025-07-28 ENCOUNTER — APPOINTMENT (OUTPATIENT)
Dept: CARDIOLOGY | Facility: CLINIC | Age: 75
End: 2025-07-28
Payer: MEDICARE

## 2025-07-28 VITALS
WEIGHT: 144 LBS | SYSTOLIC BLOOD PRESSURE: 108 MMHG | HEIGHT: 59 IN | BODY MASS INDEX: 29.03 KG/M2 | OXYGEN SATURATION: 96 % | HEART RATE: 80 BPM | DIASTOLIC BLOOD PRESSURE: 64 MMHG

## 2025-07-28 DIAGNOSIS — Z98.61 ATHEROSCLEROTIC HEART DISEASE OF NATIVE CORONARY ARTERY W/OUT ANGINA PECTORIS: ICD-10-CM

## 2025-07-28 DIAGNOSIS — E78.00 PURE HYPERCHOLESTEROLEMIA, UNSPECIFIED: ICD-10-CM

## 2025-07-28 DIAGNOSIS — I10 ESSENTIAL (PRIMARY) HYPERTENSION: ICD-10-CM

## 2025-07-28 DIAGNOSIS — I25.10 ATHEROSCLEROTIC HEART DISEASE OF NATIVE CORONARY ARTERY W/OUT ANGINA PECTORIS: ICD-10-CM

## 2025-07-28 DIAGNOSIS — I50.20 UNSPECIFIED SYSTOLIC (CONGESTIVE) HEART FAILURE: ICD-10-CM

## 2025-07-28 PROCEDURE — 93000 ELECTROCARDIOGRAM COMPLETE: CPT

## 2025-07-28 PROCEDURE — 99214 OFFICE O/P EST MOD 30 MIN: CPT | Mod: 25

## 2025-07-28 RX ORDER — RANOLAZINE 500 MG/1
500 TABLET, FILM COATED, EXTENDED RELEASE ORAL
Refills: 0 | Status: ACTIVE | COMMUNITY

## 2025-07-28 RX ORDER — ATORVASTATIN CALCIUM 80 MG/1
80 TABLET, FILM COATED ORAL DAILY
Refills: 0 | Status: ACTIVE | COMMUNITY

## 2025-07-28 RX ORDER — TICAGRELOR 90 MG/1
90 TABLET ORAL TWICE DAILY
Qty: 60 | Refills: 5 | Status: ACTIVE | COMMUNITY
Start: 2025-07-28

## 2025-08-20 ENCOUNTER — APPOINTMENT (OUTPATIENT)
Dept: CARDIOLOGY | Facility: CLINIC | Age: 75
End: 2025-08-20
Payer: MEDICARE

## 2025-08-20 VITALS
HEIGHT: 59 IN | HEART RATE: 71 BPM | SYSTOLIC BLOOD PRESSURE: 138 MMHG | DIASTOLIC BLOOD PRESSURE: 70 MMHG | OXYGEN SATURATION: 98 %

## 2025-08-20 VITALS — WEIGHT: 142 LBS | BODY MASS INDEX: 28.68 KG/M2

## 2025-08-20 DIAGNOSIS — I10 ESSENTIAL (PRIMARY) HYPERTENSION: ICD-10-CM

## 2025-08-20 DIAGNOSIS — E78.00 PURE HYPERCHOLESTEROLEMIA, UNSPECIFIED: ICD-10-CM

## 2025-08-20 DIAGNOSIS — I25.10 ATHEROSCLEROTIC HEART DISEASE OF NATIVE CORONARY ARTERY W/OUT ANGINA PECTORIS: ICD-10-CM

## 2025-08-20 DIAGNOSIS — I50.20 UNSPECIFIED SYSTOLIC (CONGESTIVE) HEART FAILURE: ICD-10-CM

## 2025-08-20 PROCEDURE — 99213 OFFICE O/P EST LOW 20 MIN: CPT

## 2025-08-20 RX ORDER — CLOPIDOGREL BISULFATE 75 MG/1
75 TABLET, FILM COATED ORAL
Qty: 90 | Refills: 3 | Status: ACTIVE | COMMUNITY
Start: 2025-08-20 | End: 1900-01-01

## 2025-08-22 ENCOUNTER — NON-APPOINTMENT (OUTPATIENT)
Age: 75
End: 2025-08-22

## 2025-08-27 ENCOUNTER — APPOINTMENT (OUTPATIENT)
Dept: CARDIOLOGY | Facility: CLINIC | Age: 75
End: 2025-08-27
Payer: MEDICARE

## 2025-08-27 PROCEDURE — 93798 PHYS/QHP OP CAR RHAB W/ECG: CPT

## 2025-08-29 ENCOUNTER — APPOINTMENT (OUTPATIENT)
Dept: CARDIOLOGY | Facility: CLINIC | Age: 75
End: 2025-08-29

## 2025-09-02 ENCOUNTER — APPOINTMENT (OUTPATIENT)
Dept: CARDIOLOGY | Facility: CLINIC | Age: 75
End: 2025-09-02

## 2025-09-02 ENCOUNTER — APPOINTMENT (OUTPATIENT)
Dept: OBGYN | Facility: CLINIC | Age: 75
End: 2025-09-02
Payer: MEDICARE

## 2025-09-02 VITALS
OXYGEN SATURATION: 98 % | BODY MASS INDEX: 29.43 KG/M2 | WEIGHT: 146 LBS | HEIGHT: 59 IN | DIASTOLIC BLOOD PRESSURE: 80 MMHG | SYSTOLIC BLOOD PRESSURE: 134 MMHG | HEART RATE: 67 BPM

## 2025-09-02 VITALS
DIASTOLIC BLOOD PRESSURE: 60 MMHG | WEIGHT: 146.25 LBS | SYSTOLIC BLOOD PRESSURE: 122 MMHG | HEIGHT: 59 IN | BODY MASS INDEX: 29.48 KG/M2

## 2025-09-02 DIAGNOSIS — Z01.419 ENCOUNTER FOR GYNECOLOGICAL EXAMINATION (GENERAL) (ROUTINE) W/OUT ABNORMAL FINDINGS: ICD-10-CM

## 2025-09-02 DIAGNOSIS — Z86.39 PERSONAL HISTORY OF OTHER ENDOCRINE, NUTRITIONAL AND METABOLIC DISEASE: ICD-10-CM

## 2025-09-02 DIAGNOSIS — Z86.79 PERSONAL HISTORY OF OTHER DISEASES OF THE CIRCULATORY SYSTEM: ICD-10-CM

## 2025-09-02 DIAGNOSIS — Z87.440 PERSONAL HISTORY OF URINARY (TRACT) INFECTIONS: ICD-10-CM

## 2025-09-02 DIAGNOSIS — Z13.820 ENCOUNTER FOR SCREENING FOR OSTEOPOROSIS: ICD-10-CM

## 2025-09-02 PROCEDURE — 99387 INIT PM E/M NEW PAT 65+ YRS: CPT | Mod: GY

## 2025-09-02 PROCEDURE — G0101: CPT

## 2025-09-03 ENCOUNTER — APPOINTMENT (OUTPATIENT)
Dept: CARDIOLOGY | Facility: CLINIC | Age: 75
End: 2025-09-03
Payer: MEDICARE

## 2025-09-03 ENCOUNTER — APPOINTMENT (OUTPATIENT)
Dept: NEPHROLOGY | Facility: CLINIC | Age: 75
End: 2025-09-03
Payer: MEDICARE

## 2025-09-03 VITALS
SYSTOLIC BLOOD PRESSURE: 112 MMHG | DIASTOLIC BLOOD PRESSURE: 72 MMHG | HEIGHT: 59 IN | OXYGEN SATURATION: 97 % | HEART RATE: 74 BPM | BODY MASS INDEX: 29.64 KG/M2 | WEIGHT: 147 LBS

## 2025-09-03 PROCEDURE — 99215 OFFICE O/P EST HI 40 MIN: CPT

## 2025-09-03 PROCEDURE — 93798 PHYS/QHP OP CAR RHAB W/ECG: CPT

## 2025-09-05 ENCOUNTER — APPOINTMENT (OUTPATIENT)
Dept: CARDIOLOGY | Facility: CLINIC | Age: 75
End: 2025-09-05

## 2025-09-05 LAB
ALBUMIN SERPL ELPH-MCNC: 4.3 G/DL
ALBUMIN, RANDOM URINE: 9.3 MG/DL
ANION GAP SERPL CALC-SCNC: 15 MMOL/L
APPEARANCE: ABNORMAL
BACTERIA: ABNORMAL /HPF
BASOPHILS # BLD AUTO: 0.04 K/UL
BASOPHILS NFR BLD AUTO: 0.5 %
BILIRUBIN URINE: NEGATIVE
BLOOD URINE: ABNORMAL
BUN SERPL-MCNC: 41 MG/DL
CALCIUM SERPL-MCNC: 9.3 MG/DL
CAST: 1 /LPF
CHLORIDE SERPL-SCNC: 99 MMOL/L
CO2 SERPL-SCNC: 22 MMOL/L
COLOR: YELLOW
CREAT SERPL-MCNC: 1.7 MG/DL
CREAT SPEC-SCNC: 37 MG/DL
EGFRCR SERPLBLD CKD-EPI 2021: 31 ML/MIN/1.73M2
EOSINOPHIL # BLD AUTO: 0.17 K/UL
EOSINOPHIL NFR BLD AUTO: 2.2 %
EPITHELIAL CELLS: 1 /HPF
GLUCOSE QUALITATIVE U: NEGATIVE MG/DL
GLUCOSE SERPL-MCNC: 140 MG/DL
HCT VFR BLD CALC: 34.7 %
HGB BLD-MCNC: 11.5 G/DL
IMM GRANULOCYTES NFR BLD AUTO: 0.8 %
KETONES URINE: NEGATIVE MG/DL
LEUKOCYTE ESTERASE URINE: ABNORMAL
LYMPHOCYTES # BLD AUTO: 1.79 K/UL
LYMPHOCYTES NFR BLD AUTO: 22.9 %
MAN DIFF?: NORMAL
MCHC RBC-ENTMCNC: 31 PG
MCHC RBC-ENTMCNC: 33.1 G/DL
MCV RBC AUTO: 93.5 FL
MICROALBUMIN/CREAT 24H UR-RTO: 250 MG/G
MICROSCOPIC-UA: NORMAL
MONOCYTES # BLD AUTO: 0.63 K/UL
MONOCYTES NFR BLD AUTO: 8.1 %
NEUTROPHILS # BLD AUTO: 5.13 K/UL
NEUTROPHILS NFR BLD AUTO: 65.5 %
NITRITE URINE: NEGATIVE
PH URINE: 7
PHOSPHATE SERPL-MCNC: 3.1 MG/DL
PLATELET # BLD AUTO: 290 K/UL
POTASSIUM SERPL-SCNC: 4.4 MMOL/L
PROTEIN URINE: 30 MG/DL
RBC # BLD: 3.71 M/UL
RBC # FLD: 14 %
RED BLOOD CELLS URINE: 5 /HPF
SODIUM SERPL-SCNC: 136 MMOL/L
SPECIFIC GRAVITY URINE: 1.01
UROBILINOGEN URINE: 0.2 MG/DL
WBC # FLD AUTO: 7.82 K/UL
WHITE BLOOD CELLS URINE: 231 /HPF

## 2025-09-08 ENCOUNTER — APPOINTMENT (OUTPATIENT)
Dept: CARDIOLOGY | Facility: CLINIC | Age: 75
End: 2025-09-08

## 2025-09-10 ENCOUNTER — APPOINTMENT (OUTPATIENT)
Dept: CARDIOLOGY | Facility: CLINIC | Age: 75
End: 2025-09-10

## 2025-09-12 ENCOUNTER — APPOINTMENT (OUTPATIENT)
Dept: CARDIOLOGY | Facility: CLINIC | Age: 75
End: 2025-09-12

## 2025-09-15 ENCOUNTER — APPOINTMENT (OUTPATIENT)
Dept: CARDIOLOGY | Facility: CLINIC | Age: 75
End: 2025-09-15

## 2025-09-17 ENCOUNTER — APPOINTMENT (OUTPATIENT)
Dept: CARDIOLOGY | Facility: CLINIC | Age: 75
End: 2025-09-17
Payer: MEDICARE

## 2025-09-17 PROCEDURE — 93798 PHYS/QHP OP CAR RHAB W/ECG: CPT

## 2025-09-19 ENCOUNTER — APPOINTMENT (OUTPATIENT)
Dept: CARDIOLOGY | Facility: CLINIC | Age: 75
End: 2025-09-19
Payer: MEDICARE

## 2025-09-19 PROCEDURE — 93798 PHYS/QHP OP CAR RHAB W/ECG: CPT

## 2025-09-19 RX ORDER — METOLAZONE 2.5 MG/1
2.5 TABLET ORAL
Qty: 1 | Refills: 3 | Status: ACTIVE | COMMUNITY
Start: 2025-09-19 | End: 1900-01-01

## (undated) DEVICE — SOL IRR POUR NS 0.9% 1000ML

## (undated) DEVICE — Device

## (undated) DEVICE — VENODYNE/SCD SLEEVE CALF MEDIUM

## (undated) DEVICE — TORQUE DEVICE FOR GUIDEWIRE 0.0100.038"

## (undated) DEVICE — DRAPE SPLIT SHEET 77" X 108"

## (undated) DEVICE — WARMING BLANKET UPPER ADULT

## (undated) DEVICE — DRAPE XL SHEET 77X98"

## (undated) DEVICE — ULTRASOUND PROBE COVER T-SHAPED INTRAOP SM

## (undated) DEVICE — SOL IRR POUR H2O 1000ML

## (undated) DEVICE — DRSG DERMABOND 0.7ML

## (undated) DEVICE — DRAPE 3/4 SHEET 52X76"

## (undated) DEVICE — DRAPE C ARM UNIVERSAL

## (undated) DEVICE — NDL HYPO REGULAR BEVEL 25G X 1.5" (BLUE)

## (undated) DEVICE — SOL BAG NS 0.9% 1000ML

## (undated) DEVICE — SYR CONTROL LUER LOK 10CC

## (undated) DEVICE — DRAPE DOME BAG 22"

## (undated) DEVICE — PACK VASCULAR